# Patient Record
Sex: FEMALE | Race: BLACK OR AFRICAN AMERICAN | NOT HISPANIC OR LATINO | Employment: OTHER | URBAN - METROPOLITAN AREA
[De-identification: names, ages, dates, MRNs, and addresses within clinical notes are randomized per-mention and may not be internally consistent; named-entity substitution may affect disease eponyms.]

---

## 2017-08-02 ENCOUNTER — OFFICE VISIT (OUTPATIENT)
Dept: OBSTETRICS AND GYNECOLOGY | Facility: CLINIC | Age: 77
End: 2017-08-02
Payer: MEDICARE

## 2017-08-02 VITALS
HEIGHT: 62 IN | WEIGHT: 174.19 LBS | SYSTOLIC BLOOD PRESSURE: 117 MMHG | BODY MASS INDEX: 32.05 KG/M2 | DIASTOLIC BLOOD PRESSURE: 63 MMHG

## 2017-08-02 DIAGNOSIS — L28.0 LICHEN SIMPLEX CHRONICUS: ICD-10-CM

## 2017-08-02 DIAGNOSIS — R21 VULVAR RASH: ICD-10-CM

## 2017-08-02 DIAGNOSIS — L29.2 VULVOVAGINAL ITCHING: Primary | ICD-10-CM

## 2017-08-02 PROCEDURE — 99999 PR PBB SHADOW E&M-NEW PATIENT-LVL III: CPT | Mod: PBBFAC,,, | Performed by: OBSTETRICS & GYNECOLOGY

## 2017-08-02 PROCEDURE — 99203 OFFICE O/P NEW LOW 30 MIN: CPT | Mod: S$GLB,,, | Performed by: OBSTETRICS & GYNECOLOGY

## 2017-08-02 PROCEDURE — 87480 CANDIDA DNA DIR PROBE: CPT

## 2017-08-02 PROCEDURE — 1159F MED LIST DOCD IN RCRD: CPT | Mod: S$GLB,,, | Performed by: OBSTETRICS & GYNECOLOGY

## 2017-08-02 PROCEDURE — 1126F AMNT PAIN NOTED NONE PRSNT: CPT | Mod: S$GLB,,, | Performed by: OBSTETRICS & GYNECOLOGY

## 2017-08-02 PROCEDURE — 87660 TRICHOMONAS VAGIN DIR PROBE: CPT

## 2017-08-02 PROCEDURE — 3008F BODY MASS INDEX DOCD: CPT | Mod: S$GLB,,, | Performed by: OBSTETRICS & GYNECOLOGY

## 2017-08-02 RX ORDER — ASPIRIN 81 MG/1
81 TABLET ORAL DAILY
Status: ON HOLD | COMMUNITY
End: 2023-04-30 | Stop reason: HOSPADM

## 2017-08-02 RX ORDER — AMLODIPINE BESYLATE 10 MG/1
10 TABLET ORAL DAILY
Status: ON HOLD | COMMUNITY
End: 2023-04-26

## 2017-08-02 RX ORDER — INSULIN ASPART 100 [IU]/ML
50 INJECTION, SUSPENSION SUBCUTANEOUS EVERY MORNING
Status: ON HOLD | COMMUNITY
End: 2023-03-17 | Stop reason: HOSPADM

## 2017-08-02 RX ORDER — METFORMIN HYDROCHLORIDE 1000 MG/1
1000 TABLET ORAL 2 TIMES DAILY WITH MEALS
Status: ON HOLD | COMMUNITY
End: 2023-04-26

## 2017-08-02 RX ORDER — HYDROXYZINE PAMOATE 25 MG/1
25 CAPSULE ORAL NIGHTLY
Qty: 30 CAPSULE | Refills: 2 | Status: SHIPPED | OUTPATIENT
Start: 2017-08-02 | End: 2017-09-13 | Stop reason: SDUPTHER

## 2017-08-02 RX ORDER — CLOBETASOL PROPIONATE 0.5 MG/G
OINTMENT TOPICAL 2 TIMES DAILY
Qty: 60 G | Refills: 3 | Status: SHIPPED | OUTPATIENT
Start: 2017-08-02 | End: 2018-01-22 | Stop reason: SDUPTHER

## 2017-08-02 RX ORDER — GLIPIZIDE 10 MG/1
10 TABLET ORAL
Status: ON HOLD | COMMUNITY
End: 2023-03-20 | Stop reason: HOSPADM

## 2017-08-02 RX ORDER — ACETAMINOPHEN 500 MG
500 TABLET ORAL DAILY
Status: ON HOLD | COMMUNITY
End: 2019-03-23 | Stop reason: HOSPADM

## 2017-08-02 RX ORDER — LORATADINE 10 MG/1
10 TABLET ORAL DAILY
COMMUNITY
End: 2018-11-08

## 2017-08-02 RX ORDER — PREDNISONE 10 MG/1
10 TABLET ORAL DAILY
Qty: 7 TABLET | Refills: 0 | Status: SHIPPED | OUTPATIENT
Start: 2017-08-02 | End: 2017-08-09

## 2017-08-02 RX ORDER — LOSARTAN POTASSIUM AND HYDROCHLOROTHIAZIDE 25; 100 MG/1; MG/1
1 TABLET ORAL DAILY
Status: ON HOLD | COMMUNITY
End: 2023-04-29 | Stop reason: HOSPADM

## 2017-08-02 RX ORDER — CITALOPRAM 10 MG/1
10 TABLET ORAL DAILY
COMMUNITY
End: 2018-11-08

## 2017-08-02 NOTE — LETTER
August 3, 2017      Vanessa Hancock MD  [address] [facility] [address]           [facility] - OB/ GYN  [address] [facility] [address]  [facility] [address]  Phone: [phone]          Patient: Nelsy Marino   MR Number: [id number]   YOB: 1940   Date of Visit: 8/2/2017       Dear Dr. Vanessa Hancock:    Thank you for referring Nelsy Marino to me for evaluation. Attached you will find relevant portions of my assessment and plan of care.    If you have questions, please do not hesitate to call me. I look forward to following Nelsy Marino along with you.    Sincerely,    Rissa Cortez MD    Enclosure  CC:  No Recipients    If you would like to receive this communication electronically, please contact [email or web address] or [phone] to request more information on [employer] Link access.    For providers and/or their staff who would like to refer a patient to [facility], please contact us through our one-stop-shop provider referral line, [facility] [place], at [phone].    If you feel you have received this communication in error or would no longer like to receive these types of communications, please e-mail [email or web address]

## 2017-08-02 NOTE — PROGRESS NOTES
Subjective:       Patient ID: Nelsy Marino is a 76 y.o. female.    Chief Complaint:  Vaginal Itching      History of Present Illness  HPI  Pt has vaginal itching. Small amount of discharge. She reports severe itching. She does scratch it sometimes. No rash. Just itching.     GYN & OB History  No LMP recorded. Patient is postmenopausal.   Date of Last Pap: No result found    OB History   No data available       Review of Systems  Review of Systems   Genitourinary: Positive for vaginal discharge and vaginal pain. Negative for pelvic pain, vaginal bleeding and vaginal odor.           Objective:    Physical Exam:   Constitutional: She is oriented to person, place, and time. She appears well-developed and well-nourished. No distress.        Pulmonary/Chest: Effort normal.        Abdominal: Soft. Normal appearance and bowel sounds are normal. There is no tenderness.     Genitourinary:       There is rash on the right labia. There is no tenderness, lesion or injury on the right labia. There is rash on the left labia. There is no tenderness, lesion or injury on the left labia. Right adnexum displays no mass, no tenderness and no fullness. Left adnexum displays no mass, no tenderness and no fullness. No erythema, tenderness or bleeding in the vagina. No vaginal discharge found. Cervix exhibits no motion tenderness, no discharge and no friability.               Neurological: She is alert and oriented to person, place, and time.    Skin: Skin is warm and dry.    Psychiatric: She has a normal mood and affect.          Assessment:        1. Vulvovaginal itching    2. Vulvar rash    3. Lichen simplex chronicus              Plan:       1. Vulvovaginal itching  - Vaginosis Screen by DNA Probe    2. Vulvar rash  - clobetasol 0.05% (TEMOVATE) 0.05 % Oint; Apply topically 2 (two) times daily.  Dispense: 60 g; Refill: 3  - hydrOXYzine pamoate (VISTARIL) 25 MG Cap; Take 1 capsule (25 mg total) by mouth every evening.  Dispense: 30  capsule; Refill: 2  - predniSONE (DELTASONE) 10 MG tablet; Take 1 tablet (10 mg total) by mouth once daily.  Dispense: 7 tablet; Refill: 0    3. Lichen simplex chronicus  - If no improvement discussed possible need for biopsy. Will monitor treatment progress.   - Discussed need to decrease itching, and wear loose clothing, cotton underwear. No harsh soaps.   - clobetasol 0.05% (TEMOVATE) 0.05 % Oint; Apply topically 2 (two) times daily.  Dispense: 60 g; Refill: 3  - hydrOXYzine pamoate (VISTARIL) 25 MG Cap; Take 1 capsule (25 mg total) by mouth every evening.  Dispense: 30 capsule; Refill: 2  - predniSONE (DELTASONE) 10 MG tablet; Take 1 tablet (10 mg total) by mouth once daily.  Dispense: 7 tablet; Refill: 0       Return in about 6 weeks (around 9/13/2017) for f/u vulvar itching.

## 2017-08-03 LAB
CANDIDA RRNA VAG QL PROBE: NEGATIVE
G VAGINALIS RRNA GENITAL QL PROBE: NEGATIVE
T VAGINALIS RRNA GENITAL QL PROBE: NEGATIVE

## 2017-09-13 ENCOUNTER — OFFICE VISIT (OUTPATIENT)
Dept: OBSTETRICS AND GYNECOLOGY | Facility: CLINIC | Age: 77
End: 2017-09-13
Payer: MEDICARE

## 2017-09-13 VITALS
SYSTOLIC BLOOD PRESSURE: 117 MMHG | HEIGHT: 62 IN | WEIGHT: 163.13 LBS | DIASTOLIC BLOOD PRESSURE: 63 MMHG | BODY MASS INDEX: 30.02 KG/M2

## 2017-09-13 DIAGNOSIS — L90.0 LICHEN SCLEROSUS ET ATROPHICUS: Primary | ICD-10-CM

## 2017-09-13 DIAGNOSIS — L28.0 LICHEN SIMPLEX CHRONICUS: ICD-10-CM

## 2017-09-13 DIAGNOSIS — R21 VULVAR RASH: ICD-10-CM

## 2017-09-13 PROCEDURE — 3008F BODY MASS INDEX DOCD: CPT | Mod: S$GLB,,, | Performed by: OBSTETRICS & GYNECOLOGY

## 2017-09-13 PROCEDURE — 99999 PR PBB SHADOW E&M-EST. PATIENT-LVL III: CPT | Mod: PBBFAC,,, | Performed by: OBSTETRICS & GYNECOLOGY

## 2017-09-13 PROCEDURE — 1159F MED LIST DOCD IN RCRD: CPT | Mod: S$GLB,,, | Performed by: OBSTETRICS & GYNECOLOGY

## 2017-09-13 PROCEDURE — 99213 OFFICE O/P EST LOW 20 MIN: CPT | Mod: S$GLB,,, | Performed by: OBSTETRICS & GYNECOLOGY

## 2017-09-13 PROCEDURE — 1126F AMNT PAIN NOTED NONE PRSNT: CPT | Mod: S$GLB,,, | Performed by: OBSTETRICS & GYNECOLOGY

## 2017-09-13 RX ORDER — HYDROXYZINE PAMOATE 25 MG/1
25 CAPSULE ORAL NIGHTLY
Qty: 30 CAPSULE | Refills: 2 | Status: ON HOLD | OUTPATIENT
Start: 2017-09-13 | End: 2019-03-20 | Stop reason: CLARIF

## 2017-09-13 NOTE — PROGRESS NOTES
Subjective:       Patient ID: Nelsy Marino is a 76 y.o. female.    Chief Complaint:  Follow-up      History of Present Illness  HPI  Pt reports significant improvement. Doing well. No complaints. She has been using the cream 2x/day as instructed. Reports itch is resolved.     GYN & OB History  No LMP recorded (exact date). Patient is postmenopausal.   Date of Last Pap: No result found    OB History   No data available       Review of Systems  Review of Systems   Genitourinary: Negative for vaginal bleeding, vaginal discharge, vaginal pain and vaginal odor.           Objective:    Physical Exam:   Constitutional: She is oriented to person, place, and time. She appears well-developed and well-nourished. No distress.    HENT:   Head: Normocephalic and atraumatic.    Eyes: EOM are normal.      Pulmonary/Chest: No respiratory distress.          Genitourinary:       There is no rash, tenderness, lesion or injury on the right labia. There is no rash, tenderness, lesion or injury on the left labia.               Neurological: She is alert and oriented to person, place, and time.     Psychiatric: She has a normal mood and affect. Her behavior is normal.          Assessment:        1. Lichen sclerosus et atrophicus    2. Vulvar rash    3. Lichen simplex chronicus              Plan:      1. Lichen sclerosus et atrophicus  - Continue current management.     2. Vulvar rash  - Improved.   - hydrOXYzine pamoate (VISTARIL) 25 MG Cap; Take 1 capsule (25 mg total) by mouth every evening.  Dispense: 30 capsule; Refill: 2    3. Lichen simplex chronicus  - hydrOXYzine pamoate (VISTARIL) 25 MG Cap; Take 1 capsule (25 mg total) by mouth every evening.  Dispense: 30 capsule; Refill: 2  - Much improvement.        Return in about 3 months (around 12/13/2017) for Lichen.

## 2018-01-22 ENCOUNTER — OFFICE VISIT (OUTPATIENT)
Dept: OBSTETRICS AND GYNECOLOGY | Facility: CLINIC | Age: 78
End: 2018-01-22
Payer: MEDICARE

## 2018-01-22 VITALS
DIASTOLIC BLOOD PRESSURE: 72 MMHG | BODY MASS INDEX: 32.05 KG/M2 | HEIGHT: 62 IN | WEIGHT: 174.19 LBS | SYSTOLIC BLOOD PRESSURE: 167 MMHG

## 2018-01-22 DIAGNOSIS — L90.0 LICHEN SCLEROSUS ET ATROPHICUS: ICD-10-CM

## 2018-01-22 PROCEDURE — 99999 PR PBB SHADOW E&M-EST. PATIENT-LVL III: CPT | Mod: PBBFAC,,, | Performed by: OBSTETRICS & GYNECOLOGY

## 2018-01-22 PROCEDURE — 99213 OFFICE O/P EST LOW 20 MIN: CPT | Mod: S$GLB,,, | Performed by: OBSTETRICS & GYNECOLOGY

## 2018-01-22 RX ORDER — CLOBETASOL PROPIONATE 0.5 MG/G
OINTMENT TOPICAL
Qty: 60 G | Refills: 3 | Status: SHIPPED | OUTPATIENT
Start: 2018-01-22 | End: 2018-11-08

## 2018-01-22 NOTE — PROGRESS NOTES
Subjective:       Patient ID: Nelsy Marino is a 77 y.o. female.    Chief Complaint:  Follow-up and Lichen Sclerosus      History of Present Illness  HPI  Pt here for follow up lichen. She is doing very well. No itching or burning. She is very happy with her progress.     GYN & OB History  No LMP recorded. Patient is postmenopausal.   Date of Last Pap: No result found    OB History   No data available       Review of Systems  Review of Systems   Genitourinary: Negative for vaginal bleeding, vaginal discharge, vaginal pain and vaginal odor.           Objective:    Physical Exam:   Constitutional: She is oriented to person, place, and time. She appears well-developed and well-nourished. No distress.    HENT:   Head: Normocephalic and atraumatic.    Eyes: EOM are normal.      Pulmonary/Chest: No respiratory distress.          Genitourinary:       There is no rash, tenderness, lesion or injury on the right labia. There is no rash, tenderness, lesion or injury on the left labia.               Neurological: She is alert and oriented to person, place, and time.     Psychiatric: She has a normal mood and affect. Her behavior is normal.          Assessment:        1. Lichen sclerosus et atrophicus              Plan:      1. Lichen sclerosus et atrophicus  - Pt doing well with lichen and clobetasol ointment. Pt currently applying it 2x/week or as needed. Will space appts out to every 6 months.   - clobetasol 0.05% (TEMOVATE) 0.05 % Oint; Apply topically twice a week.  Dispense: 60 g; Refill: 3       Follow-up in about 6 months (around 7/22/2018) for Lichen.

## 2018-11-06 DIAGNOSIS — I73.9 PVD (PERIPHERAL VASCULAR DISEASE): Primary | ICD-10-CM

## 2018-11-07 ENCOUNTER — HOSPITAL ENCOUNTER (OUTPATIENT)
Dept: RADIOLOGY | Facility: HOSPITAL | Age: 78
Discharge: HOME | End: 2018-11-07
Attending: SURGERY
Payer: MEDICARE

## 2018-11-07 DIAGNOSIS — I73.9 PVD (PERIPHERAL VASCULAR DISEASE): ICD-10-CM

## 2018-11-07 PROCEDURE — 93925 LOWER EXTREMITY STUDY: CPT | Mod: TC

## 2018-11-07 PROCEDURE — 93925 LOWER EXTREMITY STUDY: CPT | Mod: 26,,, | Performed by: RADIOLOGY

## 2018-11-08 ENCOUNTER — INITIAL CONSULT (OUTPATIENT)
Dept: VASCULAR SURGERY | Facility: CLINIC | Age: 78
End: 2018-11-08
Payer: MEDICARE

## 2018-11-08 ENCOUNTER — LAB VISIT (OUTPATIENT)
Dept: LAB | Facility: HOSPITAL | Age: 78
End: 2018-11-08
Attending: SURGERY
Payer: MEDICARE

## 2018-11-08 VITALS
SYSTOLIC BLOOD PRESSURE: 138 MMHG | BODY MASS INDEX: 31.1 KG/M2 | DIASTOLIC BLOOD PRESSURE: 66 MMHG | WEIGHT: 175.5 LBS | HEIGHT: 63 IN

## 2018-11-08 DIAGNOSIS — I74.9 EMBOLISM AND THROMBOSIS OF ARTERY: ICD-10-CM

## 2018-11-08 DIAGNOSIS — I73.9 PVD (PERIPHERAL VASCULAR DISEASE) WITH CLAUDICATION: ICD-10-CM

## 2018-11-08 DIAGNOSIS — I73.9 PVD (PERIPHERAL VASCULAR DISEASE) WITH CLAUDICATION: Primary | ICD-10-CM

## 2018-11-08 LAB
ANION GAP SERPL CALC-SCNC: 5 MMOL/L
APTT BLDCRRT: 28.1 SEC
BASOPHILS # BLD AUTO: 0.01 K/UL
BASOPHILS NFR BLD: 0.1 %
BUN SERPL-MCNC: 19 MG/DL
CALCIUM SERPL-MCNC: 9.6 MG/DL
CHLORIDE SERPL-SCNC: 109 MMOL/L
CO2 SERPL-SCNC: 25 MMOL/L
CREAT SERPL-MCNC: 1.1 MG/DL
DIFFERENTIAL METHOD: ABNORMAL
EOSINOPHIL # BLD AUTO: 0.2 K/UL
EOSINOPHIL NFR BLD: 3.6 %
ERYTHROCYTE [DISTWIDTH] IN BLOOD BY AUTOMATED COUNT: 15.7 %
EST. GFR  (AFRICAN AMERICAN): 56 ML/MIN/1.73 M^2
EST. GFR  (NON AFRICAN AMERICAN): 48 ML/MIN/1.73 M^2
GLUCOSE SERPL-MCNC: 125 MG/DL
HCT VFR BLD AUTO: 34.1 %
HGB BLD-MCNC: 10.9 G/DL
INR PPP: 1
LYMPHOCYTES # BLD AUTO: 2 K/UL
LYMPHOCYTES NFR BLD: 29.8 %
MAGNESIUM SERPL-MCNC: 2.2 MG/DL
MCH RBC QN AUTO: 24.7 PG
MCHC RBC AUTO-ENTMCNC: 32 G/DL
MCV RBC AUTO: 77 FL
MONOCYTES # BLD AUTO: 0.4 K/UL
MONOCYTES NFR BLD: 6.4 %
NEUTROPHILS # BLD AUTO: 4.1 K/UL
NEUTROPHILS NFR BLD: 60.1 %
PHOSPHATE SERPL-MCNC: 2.9 MG/DL
PLATELET # BLD AUTO: 257 K/UL
PMV BLD AUTO: 10 FL
POTASSIUM SERPL-SCNC: 4.2 MMOL/L
PROTHROMBIN TIME: 10.7 SEC
RBC # BLD AUTO: 4.41 M/UL
SODIUM SERPL-SCNC: 139 MMOL/L
WBC # BLD AUTO: 6.75 K/UL

## 2018-11-08 PROCEDURE — 1101F PT FALLS ASSESS-DOCD LE1/YR: CPT | Mod: CPTII,S$GLB,, | Performed by: SURGERY

## 2018-11-08 PROCEDURE — 99205 OFFICE O/P NEW HI 60 MIN: CPT | Mod: S$GLB,,, | Performed by: SURGERY

## 2018-11-08 PROCEDURE — 80048 BASIC METABOLIC PNL TOTAL CA: CPT

## 2018-11-08 PROCEDURE — 83735 ASSAY OF MAGNESIUM: CPT

## 2018-11-08 PROCEDURE — 85025 COMPLETE CBC W/AUTO DIFF WBC: CPT

## 2018-11-08 PROCEDURE — 36415 COLL VENOUS BLD VENIPUNCTURE: CPT

## 2018-11-08 PROCEDURE — 99999 PR PBB SHADOW E&M-EST. PATIENT-LVL IV: CPT | Mod: PBBFAC,,, | Performed by: SURGERY

## 2018-11-08 PROCEDURE — 85730 THROMBOPLASTIN TIME PARTIAL: CPT

## 2018-11-08 PROCEDURE — 84100 ASSAY OF PHOSPHORUS: CPT

## 2018-11-08 PROCEDURE — 85610 PROTHROMBIN TIME: CPT

## 2018-11-08 RX ORDER — CILOSTAZOL 50 MG/1
50 TABLET ORAL 2 TIMES DAILY
Qty: 60 TABLET | Refills: 11 | Status: ON HOLD | OUTPATIENT
Start: 2018-11-08 | End: 2023-03-17 | Stop reason: HOSPADM

## 2018-11-08 RX ORDER — DIPHENHYDRAMINE HCL 25 MG
25 CAPSULE ORAL EVERY 6 HOURS PRN
Status: ON HOLD | COMMUNITY
End: 2019-03-23 | Stop reason: HOSPADM

## 2018-11-08 NOTE — PROGRESS NOTES
Danny Dunbar MD [facility]                         2018    HPI:  Nelsy Marino is a 78 y.o. female with   Patient Active Problem List   Diagnosis    Lichen sclerosus et atrophicus    being managed by PCP and specialists who is here today for evaluation of RLE pain.  She is s/p fem-fem approx 20 yrs ago without issues until recently.  Fem-fem that was performed for RLE ALI was noted to be occluded on US at [facility].  Was scheduled for an angiogram at [facility] although was unable to proceed due to insurance purposes.  Patient states location is R calf and thigh occurring for 2 mo.  Associated signs and symptoms include none.  Quality is throbbing and severity is 9/10 at worst.  Symptoms began 2 mo ago.  Alleviating factors include rest.  Worsening factors include ambulation.  Pt states RLE claudication x 1 block.  States it is lifestyle limiting.     no MI  no Stroke  Tobacco use: quit 35 yrs ago    Past Medical History:   Diagnosis Date    Diabetes mellitus     Hypertension     Miscarriage      Past Surgical History:   Procedure Laterality Date    HYSTERECTOMY      ?unsure cervix present     Family History   Problem Relation Age of Onset    Diabetes Father     Hypertension Father      Social History     Socioeconomic History    Marital status:      Spouse name: Not on file    Number of children: Not on file    Years of education: Not on file    Highest education level: Not on file   Social Needs    Financial resource strain: Not on file    Food insecurity - worry: Not on file    Food insecurity - inability: Not on file    Transportation needs - medical: Not on file    Transportation needs - non-medical: Not on file   Occupational History    Not on file   Tobacco Use    Smoking status: Former Smoker     Last attempt to quit: 1980     Years since quittin.8    Smokeless tobacco: Never Used   Substance and Sexual Activity     "Alcohol use: No    Drug use: No    Sexual activity: No   Other Topics Concern    Not on file   Social History Narrative    Not on file       Current Outpatient Medications:     acetaminophen (TYLENOL) 500 MG tablet, Take 500 mg by mouth once daily., Disp: , Rfl:     amlodipine (NORVASC) 10 MG tablet, Take 10 mg by mouth once daily., Disp: , Rfl:     aspirin (ECOTRIN) 81 MG EC tablet, Take 81 mg by mouth once daily., Disp: , Rfl:     BLOOD SUGAR DIAGNOSTIC (TRUE METRIX GLUCOSE TEST STRIP MISC), by Misc.(Non-Drug; Combo Route) route., Disp: , Rfl:     diphenhydrAMINE (BENADRYL) 25 mg capsule, Take 25 mg by mouth every 6 (six) hours as needed for Itching., Disp: , Rfl:     glipiZIDE (GLUCOTROL) 10 MG tablet, Take 10 mg by mouth 2 (two) times daily before meals., Disp: , Rfl:     hydrOXYzine pamoate (VISTARIL) 25 MG Cap, Take 1 capsule (25 mg total) by mouth every evening., Disp: 30 capsule, Rfl: 2    insulin aspart protamine-insulin aspart (NOVOLOG 70/30) 100 unit/mL (70-30) InPn pen, Inject into the skin 2 (two) times daily before meals., Disp: , Rfl:     insulin syringe-needle,dispos. 0.3 mL 30 gauge x 5/16" Syrg, by Misc.(Non-Drug; Combo Route) route., Disp: , Rfl:     losartan-hydrochlorothiazide 100-25 mg (HYZAAR) 100-25 mg per tablet, Take 1 tablet by mouth once daily., Disp: , Rfl:     metformin (GLUCOPHAGE) 1000 MG tablet, Take 1,000 mg by mouth 2 (two) times daily with meals., Disp: , Rfl:     REVIEW OF SYSTEMS:  General: No fevers or chills; ENT: No sore throat; Allergy and Immunology: no persistent infections; Hematological and Lymphatic: No history of bleeding or easy bruising; Endocrine: negative; Respiratory: no cough, shortness of breath, or wheezing; Cardiovascular: no chest pain or dyspnea on exertion; Gastrointestinal: no abdominal pain/back, change in bowel habits, or bloody stools; Genito-Urinary: no dysuria, trouble voiding, or hematuria; Musculoskeletal: negative; Neurological: no " TIA or stroke symptoms; Psychiatric: no nervousness, anxiety or depression.    PHYSICAL EXAM:   Right Arm BP - Sittin/68 (18 1020)  Left Arm BP - Sittin/66 (18 1020)            General appearance:  Alert, well-appearing, and in no distress.  Oriented to person, place, and time                    Neurological: Normal speech, no focal findings noted; CN II - XII grossly intact. RLE with sensation to light touch, LLE with sensation to light touch.            Musculoskeletal: Digits/nail without cyanosis/clubbing.  Strength 5/5 BLE.                    Neck: Supple, no significant adenopathy                  Chest:  Clear to auscultation, no wheezes, rales or rhonchi, symmetric air entry. No use of accessory muscles               Cardiac: Normal rate and regular rhythm, S1 and S2 normal            Abdomen: Soft, nontender, nondistended, no masses or organomegaly, no hernia     No rebound tenderness noted; bowel sounds normal     No groin adenopathy      Extremities:   1+ R femoral pulse, 2+ L femoral pulse     doppler+ R popliteal pulse, doppler+ L popliteal pulse     doppler+ R PT pulse, doppler+ L PT pulse     doppler+ R DP pulse, doppler+ L DP pulse     no RLE edema, no LLE edema    Skin: RLE without tissue loss; LLE without tissue loss    LAB RESULTS:  No results found for: CBC  No results found for: LABPROT, INR  No results found for: NA, K, CL, CO2, GLU, BUN, CREATININE, CALCIUM, ANIONGAP, EGFRNONAA  No results found for: WBC, RBC, HGB, HCT, MCV, MCH, MCHC, RDW, PLT, MPV, GRAN, LYMPH, MONO, EOS, BASO, GRAN, EOSINOPHIL, BASOPHIL, DIFFMETHOD  .No results found for: HGBA1C    IMAGING:  All pertinent imaging has been reviewed and interpreted independently.    18: US showing occluded fem-fem, decreased velocity R CFA, HD significant stenosis R SFA    [facility] ARCHIE: 0.4/1    IMP/PLAN:  78 y.o. female with   Patient Active Problem List   Diagnosis    Lichen sclerosus et atrophicus    being managed  by PCP and specialists who is here today for evaluation of RLE claudication, lifestyle limiting.    -Rec RLE angiogram, L brachial access   -Will start Pletal  -Cont ASA, rec starting statin  -Heart healthy lifestyle  -Cont smoking cessation    I spent 30 minutes evaluating this patient and greater than 50% of the time was spent counseling, coordinator care and discussing the plan of care.  All questions were answered and patient stated understanding with agreement with the above treatment plan.    Danny Dunbar MD [facility]  Vascular and Endovascular Surgery

## 2018-11-08 NOTE — LETTER
November 8, 2018      Jeremías Oconnell MD  [address] [facility] [address]           [facility]  [address] [facility] [address]  [facility] [address]  Phone: [phone]  Fax: [phone]          Patient: Nelsy Marino   MR Number: [id number]   YOB: 1940   Date of Visit: 11/8/2018       Dear Dr. Jeremías Oconnell:    Thank you for referring Nelsy Marino to me for evaluation. Attached you will find relevant portions of my assessment and plan of care.    I greatly appreciate the referral and I will keep you abreast of my findings.  Please let me know if there is anything else that I can do to help you.    If you have questions, please do not hesitate to call me. I look forward to following Nelsy Marino along with you.    Sincerely,    Danny Dunbar MD    Enclosure  CC:  No Recipients    If you would like to receive this communication electronically, please contact [email or web address] or [phone] to request more information on [employer] Link access.    For providers and/or their staff who would like to refer a patient to [facility], please contact us through our one-stop-shop provider referral line, [facility] , at [phone].    If you feel you have received this communication in error or would no longer like to receive these types of communications, please e-mail [email or web address]

## 2018-11-08 NOTE — PATIENT INSTRUCTIONS
Peripheral Artery Disease (PAD)     Peripheral artery disease (PAD) occurs when the arteries that carry blood to the limbs are narrowed or blocked. This is usually due to a buildup of a fatty substance called plaque in the walls of the arteries.  PAD most often affects the arteries in the legs. When these arteries are narrowed or blocked, blood flow to the legs is reduced. This can cause leg and foot pain and other symptoms. If severe enough, reduced blood flow to the legs can lead to tissue death (gangrene) and the loss of a toe, foot, or leg. Having PAD also makes it more likely that arteries in other body areas are blocked. For instance, arteries that carry blood to the heart or brain may be affected. This raises the chances of heart attack and stroke.  Risk factors  Certain factors can make PAD more likely. They include:  · Smoking  · Diabetes  · High blood pressure  · Unhealthy cholesterol levels  · Obesity  · Inactive lifestyle  · Older age  · Family history of PAD  Symptoms  Many people with PAD have no symptoms. If symptoms do occur, they can include:  · Pain in the muscles of the calves, thighs or hips that gets worse with activity and better with rest (intermittent claudication)  · Achy, tired, or heavy feeling in the legs  · Weakness, numbness, tingling, or loss of feeling in the legs  · Changes in skin color of the legs  · Sores on the legs and feet  · Cold leg, feet, or toes  · Pain the feet or toes even when lying down (rest pain)  Home care  PAD is a chronic (lifelong) condition. Treatment is focused on managing your condition and lowering your health risks. This may include doing the following:  · If you smoke, quit. This helps prevent further damage to your arteries and lowers your health risks. Ask your provider about medicines or products that can help you quit smoking. Also consider joining a stop-smoking program or support group.  · Be more active. This helps you lose weight and manage  problems such as high blood pressure and unhealthy cholesterol levels. Start a walking program if advised to by your provider. Your provider may also help you form a safe exercise program that is right for your needs.  · Make healthy eating changes. This includes eating less fat, salt, and sugar.  · Take medicines for high blood pressure, unhealthy cholesterol levels, and diabetes as directed.  · Have your blood pressure and cholesterol levels checked as often as directed.  · If you have diabetes, try to keep your blood sugar well controlled. Test your blood sugar as directed.  · If you are overweight, talk to your provider about forming a weight-loss plan.  · Watch for cuts, scrapes, or open sores on your feet. Poor blood flow to the feet may slow healing and increase the risk of infection from these problems.   Follow-up care  Follow up with your healthcare provider, or as advised. If imaging tests such as ultrasound were done, they will be reviewed by a doctor. You will be told the results and any new findings that may affect your care.  When to seek medical advice   Call your healthcare provider right away if any of these occur:  · Sudden severe pain in the legs or feet  · Sudden cold, pale or blue color in the legs or feet  · Weakness or numbness in the legs or feet that worsens  · Any sore or wound in the legs or feet that wont heal  · Weak pulse in your legs or feet  Know the Signs of Heart Attack and Stroke  People with PAD are at high risk for heart attack and stroke. Knowing the signs of these problems can help you protect your health and get help when you need it. Call 911 right away if you have any of the following:  Signs of a Heart Attack  · Chest discomfort, such as pain, aching, tightness, or pressure that lasts more than a few minutes, or that comes and goes  · Pain or discomfort in the arms, back, shoulders, neck, or jaw  · Shortness of breath  · Sweating (often a cold, clammy  sweat)  · Nausea  · Lightheadedness  Signs of a Stroke  · Sudden numbness or weakness of the face, arms, or legs, especially on one side  · Sudden confusion or trouble speaking or understanding  · Sudden trouble seeing in one or both eyes  · Sudden trouble walking, dizziness, or loss of balance  · Sudden, severe headache with no known cause   Date Last Reviewed: 9/21/2015  © 2929-8397 [employer]. [address] [place] [address], [place] [id number]. All rights reserved. This information is not intended as a substitute for professional medical care. Always follow your healthcare professional's instructions.

## 2018-11-15 DIAGNOSIS — I74.9 ARTERIAL EMBOLISM: ICD-10-CM

## 2018-11-16 ENCOUNTER — HOSPITAL ENCOUNTER (OUTPATIENT)
Dept: RADIOLOGY | Facility: HOSPITAL | Age: 78
Discharge: HOME | End: 2018-11-16
Attending: SURGERY | Admitting: SURGERY
Payer: MEDICARE

## 2018-11-16 DIAGNOSIS — I73.9 PAD (PERIPHERAL ARTERY DISEASE): Primary | ICD-10-CM

## 2018-11-16 DIAGNOSIS — I74.9 ARTERIAL EMBOLISM: ICD-10-CM

## 2018-11-16 PROCEDURE — 25500020 PHARM REV CODE 255: Performed by: SURGERY

## 2018-11-16 PROCEDURE — 75635 CT ANGIO ABDOMINAL ARTERIES: CPT | Mod: 26,,, | Performed by: RADIOLOGY

## 2018-11-16 PROCEDURE — 75635 CT ANGIO ABDOMINAL ARTERIES: CPT | Mod: TC

## 2018-11-16 RX ADMIN — IOHEXOL 125 ML: 350 INJECTION, SOLUTION INTRAVENOUS at 08:11

## 2019-01-02 ENCOUNTER — TELEPHONE (OUTPATIENT)
Dept: VASCULAR SURGERY | Facility: CLINIC | Age: 79
End: 2019-01-02

## 2019-01-02 NOTE — TELEPHONE ENCOUNTER
----- Message from Litafilemonmeron Asha sent at 1/2/2019 10:29 AM CST -----  Contact: Self/[phone]  Patient called to request a sooner Surgery Date. She stated that she's in pain. Thank you.      1040 Call to patient who stated she wanted a sooner appointment then in February as she was having pain. Explained could get her a appointment on Jan 11 with Dr. Dunbar but she stated she could not make that day. Agreed on Jan 14th for her ultrasound and appointment after. Explained would mail out appointment slips to patient.

## 2019-01-14 ENCOUNTER — HOSPITAL ENCOUNTER (OUTPATIENT)
Dept: CARDIOLOGY | Facility: HOSPITAL | Age: 79
Discharge: HOME | End: 2019-01-14
Attending: SURGERY
Payer: MEDICARE

## 2019-01-14 ENCOUNTER — OFFICE VISIT (OUTPATIENT)
Dept: VASCULAR SURGERY | Facility: CLINIC | Age: 79
End: 2019-01-14
Payer: MEDICARE

## 2019-01-14 VITALS
HEIGHT: 63 IN | BODY MASS INDEX: 30.56 KG/M2 | HEART RATE: 62 BPM | SYSTOLIC BLOOD PRESSURE: 130 MMHG | DIASTOLIC BLOOD PRESSURE: 62 MMHG | WEIGHT: 172.5 LBS

## 2019-01-14 DIAGNOSIS — I73.9 PVD (PERIPHERAL VASCULAR DISEASE) WITH CLAUDICATION: Primary | ICD-10-CM

## 2019-01-14 DIAGNOSIS — I73.9 PAD (PERIPHERAL ARTERY DISEASE): ICD-10-CM

## 2019-01-14 DIAGNOSIS — R09.89 DIMINISHED PULSE: ICD-10-CM

## 2019-01-14 PROCEDURE — 93922 CV US ANKLE BRACHIAL INDICES WO STRESS W TOE TRACINGS (CUPID ONLY): ICD-10-PCS | Mod: 26,,, | Performed by: SURGERY

## 2019-01-14 PROCEDURE — 99999 PR PBB SHADOW E&M-EST. PATIENT-LVL IV: CPT | Mod: PBBFAC,,, | Performed by: SURGERY

## 2019-01-14 PROCEDURE — 99214 PR OFFICE/OUTPT VISIT, EST, LEVL IV, 30-39 MIN: ICD-10-PCS | Mod: S$GLB,,, | Performed by: SURGERY

## 2019-01-14 PROCEDURE — 93922 UPR/L XTREMITY ART 2 LEVELS: CPT | Mod: 50

## 2019-01-14 PROCEDURE — 99999 PR PBB SHADOW E&M-EST. PATIENT-LVL IV: ICD-10-PCS | Mod: PBBFAC,,, | Performed by: SURGERY

## 2019-01-14 PROCEDURE — 1101F PR PT FALLS ASSESS DOC 0-1 FALLS W/OUT INJ PAST YR: ICD-10-PCS | Mod: CPTII,S$GLB,, | Performed by: SURGERY

## 2019-01-14 PROCEDURE — 1101F PT FALLS ASSESS-DOCD LE1/YR: CPT | Mod: CPTII,S$GLB,, | Performed by: SURGERY

## 2019-01-14 PROCEDURE — 93922 UPR/L XTREMITY ART 2 LEVELS: CPT | Mod: 26,,, | Performed by: SURGERY

## 2019-01-14 PROCEDURE — 99214 OFFICE O/P EST MOD 30 MIN: CPT | Mod: S$GLB,,, | Performed by: SURGERY

## 2019-01-14 NOTE — PATIENT INSTRUCTIONS
Taking Aspirin for Atherosclerosis       Aspirin is a medicine often prescribed to treat atherosclerosis. This condition affects your arteries. These are the blood vessels that carry blood away from your heart. Having atherosclerosis means youre at greater risk of a heart attack or stroke. Aspirin can help prevent these from happening.  How atherosclerosis affects your arteries   A fatty material (plaque) can build up in your arteries. This makes it harder for blood to flow through them. A blood clot can then form on the plaque. This may block the artery, cutting off blood flow. This can cause conditions such as coronary artery disease (CAD) and peripheral arterial disease (PAD):  · CAD occurs when plaque builds up in the coronary artery. This artery supplies the heart with oxygen-rich blood.  · PAD occurs when plaque forms in leg arteries.  The same things that cause CAD and PAD can also cause plaque to form in other arteries in your body, such as those in the brain. When plaque occurs in any of these arteries, it raises your risk of heart attack or stroke.  What aspirin does  Aspirin is a blood-thinner (antiplatelet medicine). It helps keep blood clots from forming. This reduces the risk of blockage. Aspirin can be taken daily if you are at high risk of or have already had a heart attack or stroke. It is also used after a procedure called a stent placement. This is when a tiny wire mesh tube, or stent, is placed in an artery to keep it open. Aspirin helps prevent blood clots from forming on the stent.  Taking aspirin safely  Tell your healthcare provider about any medicines you are taking. This includes:  · All prescription medicines  · Over-the-counter medicines  · Herbs, vitamins, and other supplements  Also mention if you have a history of ulcers or bleeding problems. Ask whether you will need to stop taking aspirin before having surgery or dental work. Always take medicines as directed.  Tips for taking  aspirin  · Have a routine. For example, take aspirin with the same meal each day.  · Dont take more than prescribed. A low dose gives the same benefit as a higher one, with a lower risk of side effects.  · Dont skip doses. Aspirin needs to be taken each day to work well.  · Keep track of what you take. A pillbox with days of the week can help, especially if you take several medicines. Or use a list or chart to keep track.  When to call your healthcare provider  Side effects of aspirin are not usually serious. If you do have problems, changing your dose may help. Call your healthcare provider if you have any of the following:  · Excessive bruising (some bruising is normal)  · Nosebleeds, bleeding gums, or other excessive bleeding  · An upset stomach or stomach pain   Date Last Reviewed: 6/1/2016  © 1112-7613 [employer]. [address] [place] [address]. All rights reserved. This information is not intended as a substitute for professional medical care. Always follow your healthcare professional's instructions.        A Walking Program for Peripheral Arterial Disease (PAD)  Peripheral arterial disease (PAD) is a condition where the arteries in the legs are severely damaged. When you have PAD, walking can be painful. So you may start to walk less. Walking less makes your leg muscles weaker. It then becomes harder and more painful to walk. A walking program can break this cycle. This sheet gives you tips on how to get started.     Walking farther without pain  Exercise strengthens leg muscles that are out of shape. It also trains these muscles to work with less oxygen. This helps your leg muscles work better even with reduced blood flow to your legs. When you have PAD, a walking program can be helpful. Your program can:  · Let you walk longer and farther without claudication. This is an ache in your legs during exercise that goes away with rest.  · Let you do more and be more active  · Add to  your overall health and well-being  · Help you control your blood sugar and blood pressure  · Help you become healthier with no risk and at little or no cost  Getting started  Your local hospital, vascular center, or cardiac rehab center may have a special walking program for people with PAD. If so, this is your best option. But if you cant find a program, or its not covered by insurance, you can still walk on your own. Follow these steps at each session:  · Step 1. Start at a pace that lets you walk for 5 to 10 minutes before you start to feel claudication. This feeling is unpleasant, but it doesnt hurt you. Keep going until the pain makes you feel that you need to stop.  · Step 2. Stop and rest for 3 to 5 minutes, just long enough for the pain to go away. You can rest standing or sitting. (Some people like to bring along a cane or a lightweight folding chair.)  · Step 3. Again walk at a pace that lets you walk for 5 to 10 minutes more before you feel pain. This may be slower than your starting pace in step 1. Then rest again.  · Step 4. Repeat this process until youve walked for 45 minutes. This should be about 60 to 80 minutes total, including resting time. You may not be able to do a full 45 minutes at first. Do as much as you can, and increase your time as you improve.  Making the most of your program  · Walk at least 3 times a week. Take no more than 2 days off between sessions. If you stop walking, even for a week or two, you can lose the health benefits of your program.  · Find a good place to walk. A treadmill or a track may be better at first. That way, you wont run the risk of going too far and finding that you cant walk back. Be sure to have a place to walk indoors in bad weather, such as a gym or a mall.  · Wear shoes with sturdy, flexible soles. The heel should fit without slipping. You should have room to wiggle your toes.  · Keep track of how long you walk. A pedometer will show your daily  progress. It can also show how much farther you can walk as time goes by.  · Ask a friend to keep you company. You may enjoy walking with someone else. Or you may want to make your walking sessions a time to relax by yourself.  · Make it fun. Listen to music while you walk and rest. Walk in a favorite park. Get to know the people at the gym, or the folks that you pass on your route. While you rest, you can window-shop, read, or feed the birds. Do whatever will help you enjoy your exercise sessions.  Date Last Reviewed: 6/1/2016 © 2000-2017 [employer]. [address] [place] [address], [place] [address]. All rights reserved. This information is not intended as a substitute for professional medical care. Always follow your healthcare professional's instructions.        Peripheral Artery Disease (PAD)     Peripheral artery disease (PAD) occurs when the arteries that carry blood to the limbs are narrowed or blocked. This is usually due to a buildup of a fatty substance called plaque in the walls of the arteries.  PAD most often affects the arteries in the legs. When these arteries are narrowed or blocked, blood flow to the legs is reduced. This can cause leg and foot pain and other symptoms. If severe enough, reduced blood flow to the legs can lead to tissue death (gangrene) and the loss of a toe, foot, or leg. Having PAD also makes it more likely that arteries in other body areas are blocked. For instance, arteries that carry blood to the heart or brain may be affected. This raises the chances of heart attack and stroke.  Risk factors  Certain factors can make PAD more likely. They include:  · Smoking  · Diabetes  · High blood pressure  · Unhealthy cholesterol levels  · Obesity  · Inactive lifestyle  · Older age  · Family history of PAD  Symptoms  Many people with PAD have no symptoms. If symptoms do occur, they can include:  · Pain in the muscles of the calves, thighs or hips that gets worse with activity and  better with rest (intermittent claudication)  · Achy, tired, or heavy feeling in the legs  · Weakness, numbness, tingling, or loss of feeling in the legs  · Changes in skin color of the legs  · Sores on the legs and feet  · Cold leg, feet, or toes  · Pain the feet or toes even when lying down (rest pain)  Home care  PAD is a chronic (lifelong) condition. Treatment is focused on managing your condition and lowering your health risks. This may include doing the following:  · If you smoke, quit. This helps prevent further damage to your arteries and lowers your health risks. Ask your provider about medicines or products that can help you quit smoking. Also consider joining a stop-smoking program or support group.  · Be more active. This helps you lose weight and manage problems such as high blood pressure and unhealthy cholesterol levels. Start a walking program if advised to by your provider. Your provider may also help you form a safe exercise program that is right for your needs.  · Make healthy eating changes. This includes eating less fat, salt, and sugar.  · Take medicines for high blood pressure, unhealthy cholesterol levels, and diabetes as directed.  · Have your blood pressure and cholesterol levels checked as often as directed.  · If you have diabetes, try to keep your blood sugar well controlled. Test your blood sugar as directed.  · If you are overweight, talk to your provider about forming a weight-loss plan.  · Watch for cuts, scrapes, or open sores on your feet. Poor blood flow to the feet may slow healing and increase the risk of infection from these problems.   Follow-up care  Follow up with your healthcare provider, or as advised. If imaging tests such as ultrasound were done, they will be reviewed by a doctor. You will be told the results and any new findings that may affect your care.  When to seek medical advice   Call your healthcare provider right away if any of these occur:  · Sudden severe pain  in the legs or feet  · Sudden cold, pale or blue color in the legs or feet  · Weakness or numbness in the legs or feet that worsens  · Any sore or wound in the legs or feet that wont heal  · Weak pulse in your legs or feet  Know the Signs of Heart Attack and Stroke  People with PAD are at high risk for heart attack and stroke. Knowing the signs of these problems can help you protect your health and get help when you need it. Call 911 right away if you have any of the following:  Signs of a Heart Attack  · Chest discomfort, such as pain, aching, tightness, or pressure that lasts more than a few minutes, or that comes and goes  · Pain or discomfort in the arms, back, shoulders, neck, or jaw  · Shortness of breath  · Sweating (often a cold, clammy sweat)  · Nausea  · Lightheadedness  Signs of a Stroke  · Sudden numbness or weakness of the face, arms, or legs, especially on one side  · Sudden confusion or trouble speaking or understanding  · Sudden trouble seeing in one or both eyes  · Sudden trouble walking, dizziness, or loss of balance  · Sudden, severe headache with no known cause   Date Last Reviewed: 9/21/2015  © 4227-0770 [employer]. [address] [place] [address], [place] [address]. All rights reserved. This information is not intended as a substitute for professional medical care. Always follow your healthcare professional's instructions.

## 2019-01-14 NOTE — LETTER
January 14, 2019        [place] [address] [facility] [address]             [facility] [place] - Vascular Surgery  [address]  [facility] [address]  Phone: [phone]  Fax: [phone]   Patient: Nelsy Marino   MR Number: [id number]   YOB: 1940   Date of Visit: 1/14/2019       Dear Dr. Garay:    Thank you for referring Nelsy Marino to me for evaluation. Below are the relevant portions of my assessment and plan of care.            If you have questions, please do not hesitate to call me. I look forward to following Nelsy along with you.    Sincerely,      Danny Dunbar MD           CC  No Recipients

## 2019-01-15 LAB
LEFT ABI: 1.11
LEFT ARM BP: 133 MMHG
LEFT DORSALIS PEDIS: 138 MMHG
LEFT POSTERIOR TIBIAL: 147 MMHG
LEFT TBI: 0.48
LEFT TOE PRESSURE: 64 MMHG
RIGHT ABI: 0.37
RIGHT ARM BP: 127 MMHG
RIGHT DORSALIS PEDIS: 49 MMHG
RIGHT POSTERIOR TIBIAL: 25 MMHG
RIGHT TBI: 0
RIGHT TOE PRESSURE: 0 MMHG

## 2019-01-29 ENCOUNTER — HOSPITAL ENCOUNTER (OUTPATIENT)
Dept: RADIOLOGY | Facility: HOSPITAL | Age: 79
Discharge: HOME | End: 2019-01-29
Attending: SURGERY
Payer: MEDICARE

## 2019-01-29 ENCOUNTER — TELEPHONE (OUTPATIENT)
Dept: VASCULAR SURGERY | Facility: CLINIC | Age: 79
End: 2019-01-29

## 2019-01-29 ENCOUNTER — HOSPITAL ENCOUNTER (OUTPATIENT)
Dept: CARDIOLOGY | Facility: HOSPITAL | Age: 79
Discharge: HOME | End: 2019-01-29
Attending: SURGERY
Payer: MEDICARE

## 2019-01-29 DIAGNOSIS — R09.89 DIMINISHED PULSE: ICD-10-CM

## 2019-01-29 DIAGNOSIS — I73.9 PVD (PERIPHERAL VASCULAR DISEASE) WITH CLAUDICATION: ICD-10-CM

## 2019-01-29 LAB
LEFT BRACHIAL BP: 134 MMHG
LEFT RADIAL BP CV US: 182 MMHG
LEFT ULNAR BP CV US: 205 MMHG
RIGHT BRACHIAL BP CV US: 142 MMHG
RIGHT RADIAL BP CV US: 190 MMHG
RIGHT ULNAR BP CV US: 182 MMHG

## 2019-01-29 PROCEDURE — 71275 CTA CHEST NON CORONARY: ICD-10-PCS | Mod: 26,,, | Performed by: RADIOLOGY

## 2019-01-29 PROCEDURE — 93923 CV US WRIST BRACHIAL INDICIES (CUPID ONLY): ICD-10-PCS | Mod: 26,,, | Performed by: SURGERY

## 2019-01-29 PROCEDURE — 71275 CT ANGIOGRAPHY CHEST: CPT | Mod: TC

## 2019-01-29 PROCEDURE — 25500020 PHARM REV CODE 255: Performed by: SURGERY

## 2019-01-29 PROCEDURE — 93923 UPR/LXTR ART STDY 3+ LVLS: CPT | Mod: 26,,, | Performed by: SURGERY

## 2019-01-29 PROCEDURE — 93923 UPR/LXTR ART STDY 3+ LVLS: CPT | Mod: 50

## 2019-01-29 PROCEDURE — 71275 CT ANGIOGRAPHY CHEST: CPT | Mod: 26,,, | Performed by: RADIOLOGY

## 2019-01-29 RX ADMIN — IOHEXOL 75 ML: 350 INJECTION, SOLUTION INTRAVENOUS at 10:01

## 2019-01-29 NOTE — TELEPHONE ENCOUNTER
Call to [facility] so that her primary care doctor could be notified that her potassium was 5.5. Explained that did fax her lab work to the office also. The  stated she would let patients primary care doctor know.

## 2019-01-29 NOTE — TELEPHONE ENCOUNTER
Call to Ms. Marino to let her know that her potassium level was a little above what it should be and that her results were faxed to her primary care doctor. Asked patient if she was feeling ok and she stated that she felt fine today. Also explained that her appointment would be moved from the 4th to the 14th so that her heart test could be done before she talk to Dr. Dunbar about surgery. She stated understanding.

## 2019-02-04 ENCOUNTER — TELEPHONE (OUTPATIENT)
Dept: VASCULAR SURGERY | Facility: CLINIC | Age: 79
End: 2019-02-04

## 2019-02-04 NOTE — TELEPHONE ENCOUNTER
----- Message from Nadege Woodall sent at 2/4/2019 11:33 AM CST -----  Contact: self [phone]  Pt states that she tried to get medical records from  but they only go back 10 years      1145 Call to Ms. Marino and left a voicemail stating that received her message and would let Dr. Dunbar know.

## 2019-02-04 NOTE — TELEPHONE ENCOUNTER
Call to Ms. Marino for her to have her records from [facility] to fax over the records from her last surgery done by Dr. Oconnell. Gave MsYaya Marino the fax number to the office. She stated she would talk with them about getting the records.

## 2019-02-06 ENCOUNTER — TELEPHONE (OUTPATIENT)
Dept: VASCULAR SURGERY | Facility: CLINIC | Age: 79
End: 2019-02-06

## 2019-02-06 NOTE — TELEPHONE ENCOUNTER
----- Message from Geena De Leon sent at 2/6/2019 10:18 AM CST -----  Contact: pt  Name of Who is Calling: pt      What is the request in detail: pt needs to discuss her records from [facility]. Call pt      Can the clinic reply by [facility]: no      What Number [facility] Back [facility] [phone]    1037 Call to patient who stated that she wanted to be sure the office received her message about [facility] records. Explained that we did and Dr. Dunbar was aware that her records could not be obtained as they were over 10 years ago.

## 2019-02-14 ENCOUNTER — OFFICE VISIT (OUTPATIENT)
Dept: VASCULAR SURGERY | Facility: CLINIC | Age: 79
End: 2019-02-14
Payer: MEDICARE

## 2019-02-14 VITALS
WEIGHT: 170.94 LBS | DIASTOLIC BLOOD PRESSURE: 58 MMHG | BODY MASS INDEX: 30.29 KG/M2 | SYSTOLIC BLOOD PRESSURE: 122 MMHG | HEIGHT: 63 IN

## 2019-02-14 DIAGNOSIS — I73.9 PVD (PERIPHERAL VASCULAR DISEASE) WITH CLAUDICATION: Primary | ICD-10-CM

## 2019-02-14 PROCEDURE — 99214 OFFICE O/P EST MOD 30 MIN: CPT | Mod: S$GLB,,, | Performed by: SURGERY

## 2019-02-14 PROCEDURE — 99999 PR PBB SHADOW E&M-EST. PATIENT-LVL III: CPT | Mod: PBBFAC,,, | Performed by: SURGERY

## 2019-02-14 PROCEDURE — 1101F PR PT FALLS ASSESS DOC 0-1 FALLS W/OUT INJ PAST YR: ICD-10-PCS | Mod: CPTII,S$GLB,, | Performed by: SURGERY

## 2019-02-14 PROCEDURE — 1101F PT FALLS ASSESS-DOCD LE1/YR: CPT | Mod: CPTII,S$GLB,, | Performed by: SURGERY

## 2019-02-14 PROCEDURE — 99999 PR PBB SHADOW E&M-EST. PATIENT-LVL III: ICD-10-PCS | Mod: PBBFAC,,, | Performed by: SURGERY

## 2019-02-14 PROCEDURE — 99214 PR OFFICE/OUTPT VISIT, EST, LEVL IV, 30-39 MIN: ICD-10-PCS | Mod: S$GLB,,, | Performed by: SURGERY

## 2019-02-14 NOTE — PATIENT INSTRUCTIONS
A Walking Program for Peripheral Arterial Disease (PAD)  Peripheral arterial disease (PAD) is a condition where the arteries in the legs are severely damaged. When you have PAD, walking can be painful. So you may start to walk less. Walking less makes your leg muscles weaker. It then becomes harder and more painful to walk. A walking program can break this cycle. This sheet gives you tips on how to get started.     Walking farther without pain  Exercise strengthens leg muscles that are out of shape. It also trains these muscles to work with less oxygen. This helps your leg muscles work better even with reduced blood flow to your legs. When you have PAD, a walking program can be helpful. Your program can:  · Let you walk longer and farther without claudication. This is an ache in your legs during exercise that goes away with rest.  · Let you do more and be more active  · Add to your overall health and well-being  · Help you control your blood sugar and blood pressure  · Help you become healthier with no risk and at little or no cost  Getting started  Your local hospital, vascular center, or cardiac rehab center may have a special walking program for people with PAD. If so, this is your best option. But if you cant find a program, or its not covered by insurance, you can still walk on your own. Follow these steps at each session:  · Step 1. Start at a pace that lets you walk for 5 to 10 minutes before you start to feel claudication. This feeling is unpleasant, but it doesnt hurt you. Keep going until the pain makes you feel that you need to stop.  · Step 2. Stop and rest for 3 to 5 minutes, just long enough for the pain to go away. You can rest standing or sitting. (Some people like to bring along a cane or a lightweight folding chair.)  · Step 3. Again walk at a pace that lets you walk for 5 to 10 minutes more before you feel pain. This may be slower than your starting pace in step 1. Then rest again.  · Step 4.  Repeat this process until youve walked for 45 minutes. This should be about 60 to 80 minutes total, including resting time. You may not be able to do a full 45 minutes at first. Do as much as you can, and increase your time as you improve.  Making the most of your program  · Walk at least 3 times a week. Take no more than 2 days off between sessions. If you stop walking, even for a week or two, you can lose the health benefits of your program.  · Find a good place to walk. A treadmill or a track may be better at first. That way, you wont run the risk of going too far and finding that you cant walk back. Be sure to have a place to walk indoors in bad weather, such as a gym or a mall.  · Wear shoes with sturdy, flexible soles. The heel should fit without slipping. You should have room to wiggle your toes.  · Keep track of how long you walk. A pedometer will show your daily progress. It can also show how much farther you can walk as time goes by.  · Ask a friend to keep you company. You may enjoy walking with someone else. Or you may want to make your walking sessions a time to relax by yourself.  · Make it fun. Listen to music while you walk and rest. Walk in a favorite park. Get to know the people at the gym, or the folks that you pass on your route. While you rest, you can window-shop, read, or feed the birds. Do whatever will help you enjoy your exercise sessions.  Date Last Reviewed: 6/1/2016 © 2000-2017 [employer]. [address] [place] [address], [place] [address]. All rights reserved. This information is not intended as a substitute for professional medical care. Always follow your healthcare professional's instructions.        Peripheral Artery Bypass Surgery  Surgery to bypass a blocked leg artery can ease your symptoms. The bypass is done with a special tube (graft) that directs blood around the blockage.  Attaching the graft  Peripheral bypass grafts carry blood from the femoral artery in your  thigh to an artery further down your leg. During the surgery, a graft is stitched into the artery above and below your blockage. This creates a new passage for blood flow. The blocked section of your artery is not removed. After the graft is in place, your doctor closes the cuts (incisions) in your skin with stitches or staples.  Types of grafts  · A vein from your own legs or arms can be used as a blood vessel graft. Blood vessel grafts often come from your own leg. Vein grafts work better in long leg blockages that start from your groin and extend below your knee.  · Manmade (synthetic) grafts are materials that your body easily accepts. These grafts work best on arteries at or above the knee.   Types of peripheral bypasses  The type of bypass depends on where your artery is blocked.    Risks and complications  · Bleeding or blood clots  · Urgent need for surgery again if graft is blocked by clot or debris  · Graft blockage  · Heart attack or stroke  · Breathing problems  · Infection  · Need for second bypass or surgery to remove dead tissue (amputation)  · Nerve damage and numbness  · Complications from anesthesia   Date Last Reviewed: 6/1/2016  © 7447-8581 [employer]. [address] [place] [address], [place] [address]. All rights reserved. This information is not intended as a substitute for professional medical care. Always follow your healthcare professional's instructions.

## 2019-02-14 NOTE — LETTER
February 14, 2019        [place] [address] [facility] [address]             [facility] [place] [facility]  [address]  [facility] [address]  Phone: [phone]  Fax: [phone]   Patient: Nelsy Marino   MR Number: [id number]   YOB: 1940   Date of Visit: 2/14/2019       Dear Dr. Garay:    Thank you for referring Nelsy Marino to me for evaluation. Below are the relevant portions of my assessment and plan of care.            If you have questions, please do not hesitate to call me. I look forward to following Nelsy along with you.    Sincerely,      Danny Dunbar MD           CC  No Recipients

## 2019-02-22 ENCOUNTER — TELEPHONE (OUTPATIENT)
Dept: VASCULAR SURGERY | Facility: CLINIC | Age: 79
End: 2019-02-22

## 2019-02-22 NOTE — TELEPHONE ENCOUNTER
Called office to ask about surgery date. Explained that Dr. Dunbar has still not given a date for his wife's surgery yet. Explained would let Dr. Dunbar know that he had called and would call his wife as soon as a date was decided upon.

## 2019-02-24 DIAGNOSIS — I70.229 CRITICAL LOWER LIMB ISCHEMIA: Primary | ICD-10-CM

## 2019-02-25 ENCOUNTER — TELEPHONE (OUTPATIENT)
Dept: VASCULAR SURGERY | Facility: CLINIC | Age: 79
End: 2019-02-25

## 2019-02-25 NOTE — TELEPHONE ENCOUNTER
Call to patient to give her date for her surgery and the date and time of her preop appointment. Explained they would call the day before her surgery to give her a time. She stated understanding. Explained would also mail out appointment slip for preop appointment.

## 2019-03-13 ENCOUNTER — TELEPHONE (OUTPATIENT)
Dept: VASCULAR SURGERY | Facility: CLINIC | Age: 79
End: 2019-03-13

## 2019-03-13 ENCOUNTER — HOSPITAL ENCOUNTER (OUTPATIENT)
Dept: PREADMISSION TESTING | Facility: HOSPITAL | Age: 79
Discharge: HOME | End: 2019-03-13
Attending: SURGERY
Payer: MEDICARE

## 2019-03-13 VITALS
DIASTOLIC BLOOD PRESSURE: 69 MMHG | WEIGHT: 170 LBS | TEMPERATURE: 98 F | RESPIRATION RATE: 18 BRPM | BODY MASS INDEX: 30.12 KG/M2 | HEIGHT: 63 IN | HEART RATE: 80 BPM | SYSTOLIC BLOOD PRESSURE: 139 MMHG

## 2019-03-13 DIAGNOSIS — I70.229 CRITICAL LOWER LIMB ISCHEMIA: ICD-10-CM

## 2019-03-13 LAB
ANION GAP SERPL CALC-SCNC: 7 MMOL/L
APTT BLDCRRT: 31 SEC
BASOPHILS # BLD AUTO: 0.01 K/UL
BASOPHILS NFR BLD: 0.1 %
BUN SERPL-MCNC: 24 MG/DL
CALCIUM SERPL-MCNC: 10.4 MG/DL
CHLORIDE SERPL-SCNC: 105 MMOL/L
CO2 SERPL-SCNC: 25 MMOL/L
CREAT SERPL-MCNC: 1.3 MG/DL
DIFFERENTIAL METHOD: ABNORMAL
EOSINOPHIL # BLD AUTO: 0.3 K/UL
EOSINOPHIL NFR BLD: 3.8 %
ERYTHROCYTE [DISTWIDTH] IN BLOOD BY AUTOMATED COUNT: 15.4 %
EST. GFR  (AFRICAN AMERICAN): 45 ML/MIN/1.73 M^2
EST. GFR  (NON AFRICAN AMERICAN): 39 ML/MIN/1.73 M^2
GLUCOSE SERPL-MCNC: 189 MG/DL
HCT VFR BLD AUTO: 37.7 %
HGB BLD-MCNC: 11.7 G/DL
INR PPP: 1
LYMPHOCYTES # BLD AUTO: 1.8 K/UL
LYMPHOCYTES NFR BLD: 26.1 %
MAGNESIUM SERPL-MCNC: 2 MG/DL
MCH RBC QN AUTO: 24 PG
MCHC RBC AUTO-ENTMCNC: 31 G/DL
MCV RBC AUTO: 77 FL
MONOCYTES # BLD AUTO: 0.4 K/UL
MONOCYTES NFR BLD: 5.9 %
NEUTROPHILS # BLD AUTO: 4.4 K/UL
NEUTROPHILS NFR BLD: 64.1 %
PHOSPHATE SERPL-MCNC: 3.8 MG/DL
PLATELET # BLD AUTO: 249 K/UL
PMV BLD AUTO: 10.5 FL
POTASSIUM SERPL-SCNC: 4.4 MMOL/L
PROTHROMBIN TIME: 10.7 SEC
RBC # BLD AUTO: 4.87 M/UL
SODIUM SERPL-SCNC: 137 MMOL/L
WBC # BLD AUTO: 6.82 K/UL

## 2019-03-13 PROCEDURE — 85025 COMPLETE CBC W/AUTO DIFF WBC: CPT

## 2019-03-13 PROCEDURE — 36415 COLL VENOUS BLD VENIPUNCTURE: CPT

## 2019-03-13 PROCEDURE — 80048 BASIC METABOLIC PNL TOTAL CA: CPT

## 2019-03-13 PROCEDURE — 85730 THROMBOPLASTIN TIME PARTIAL: CPT

## 2019-03-13 PROCEDURE — 83735 ASSAY OF MAGNESIUM: CPT

## 2019-03-13 PROCEDURE — 85610 PROTHROMBIN TIME: CPT

## 2019-03-13 PROCEDURE — 84100 ASSAY OF PHOSPHORUS: CPT

## 2019-03-13 RX ORDER — INSULIN ASPART 100 [IU]/ML
50 INJECTION, SUSPENSION SUBCUTANEOUS NIGHTLY
Status: ON HOLD | COMMUNITY
End: 2023-03-17 | Stop reason: HOSPADM

## 2019-03-13 NOTE — DISCHARGE INSTRUCTIONS
"Your procedure  is scheduled for __3/20/2019_______.    Call [phone] between 2pm and 5pm on _3/19/2019_____to find out your arrival time for the day of surgery.    Report to Same Day Surgery Unit at ____ AM on the 2nd floor of the hospital.  Use the front entrance of the hospital.  The front doors of the hospital open promptly at 5:30am.  If you need wheelchair assistance, call [phone] from your cell phone, or call "0" from the courtesy phone in the lobby.    Important instructions:   Do not eat or drink after 12 midnight, including water.  It is okay to brush your teeth.  Do not have gum, candy or mints.     Take only these medications with a small swallow of water on the morning of your surgery ___amlodipine___________    Do not take any diabetic medication on the morning of surgery unless instructed to do so by your doctor or pre op nurse.    Stop taking Aspirin, Ibuprofen, Motrin and Aleve , Fish oil, and Vitamin E for at least 7 days before your surgery. You may use Tylenol unless otherwise instructed by your doctor.          (We will find out about Pletal and Aspirin from Dr Dunbar)       Please shower the night before and the morning of your surgery.        Use Hibiclens soap to your surgery site if instructed by your pre op nurse.   If your surgery is on your abdomen, be sure to wash your naval.  Be sure to rinse off Hibiclens after it is on your skin for several minutes.  Do not use Hibiclens on your face or genitals. Please place clean linens on your bed the night before surgery. Please wear fresh clean clothing after each shower.     No shaving of procedural area at least 4-5 days before surgery due to increased risk of skin irritation and/or possible infection.      You may be asked to take a third shower on arrival to Same Day Surgery depending on the type of surgery you are having.     Do not wear make- up, including mascara.     You may wear deodorant only.      Do not wear powder, body " lotion or perfume/cologne.     Do not wear any jewelry or have any metal on your body.     You will be asked to remove any dentures or partials for the procedure.     Please bring any documents given to you by your doctor.     If you are going home on the same day of surgery, you must arrange for a family member or a friend to drive you home.  Public transportation is prohibited.  You will not be able to drive home if you were given anesthesia or sedation.     Wear loose fitting clothes allowing for bandages.     Please leave money and valuables home.       You may bring your cell phone.     Call the doctor if fever or illness should occur before your surgery.    Call [phone] to contact us here if needed.

## 2019-03-13 NOTE — TELEPHONE ENCOUNTER
Call to Ms. Marino and explained that spoke with Dr. Dunbar and he wants her to stop taking her Pletal today. So explained after today to not take that medication until after her surgery. Also explained to stop taking her aspirin 48hr before her surgery. So the 17th would be her last dose of Aspirin. I did repeat this twice and explained if any questions about anything to call the office. Ms. Marino stated understanding.

## 2019-03-13 NOTE — PRE-PROCEDURE INSTRUCTIONS
Consents will be signed on day of surgery.  Nazanin with Dr Dunbar will notify patient if she needs to stop blood thinners.

## 2019-03-19 ENCOUNTER — ANESTHESIA EVENT (OUTPATIENT)
Dept: SURGERY | Facility: HOSPITAL | Age: 79
DRG: 271 | End: 2019-03-19
Payer: MEDICARE

## 2019-03-20 ENCOUNTER — ANESTHESIA (OUTPATIENT)
Dept: SURGERY | Facility: HOSPITAL | Age: 79
DRG: 271 | End: 2019-03-20
Payer: MEDICARE

## 2019-03-20 ENCOUNTER — HOSPITAL ENCOUNTER (INPATIENT)
Facility: HOSPITAL | Age: 79
LOS: 4 days | Discharge: HOME HEALTH | DRG: 271 | End: 2019-03-24
Attending: SURGERY | Admitting: SURGERY
Payer: MEDICARE

## 2019-03-20 DIAGNOSIS — I73.9 VASCULAR CLAUDICATION: ICD-10-CM

## 2019-03-20 DIAGNOSIS — E87.5 POTASSIUM (K) EXCESS: ICD-10-CM

## 2019-03-20 DIAGNOSIS — E11.8 TYPE 2 DIABETES MELLITUS WITH COMPLICATION, UNSPECIFIED WHETHER LONG TERM INSULIN USE: Chronic | ICD-10-CM

## 2019-03-20 DIAGNOSIS — I73.9 PVD (PERIPHERAL VASCULAR DISEASE): Primary | ICD-10-CM

## 2019-03-20 DIAGNOSIS — I50.32 DIASTOLIC CHF, CHRONIC: ICD-10-CM

## 2019-03-20 DIAGNOSIS — I70.229 CRITICAL LOWER LIMB ISCHEMIA: ICD-10-CM

## 2019-03-20 DIAGNOSIS — I10 ESSENTIAL HYPERTENSION: Chronic | ICD-10-CM

## 2019-03-20 PROBLEM — E11.9 TYPE 2 DIABETES MELLITUS: Chronic | Status: ACTIVE | Noted: 2019-03-20

## 2019-03-20 PROBLEM — Z86.718 HISTORY OF DVT (DEEP VEIN THROMBOSIS): Chronic | Status: ACTIVE | Noted: 2019-03-20

## 2019-03-20 PROBLEM — E11.9 DIABETES MELLITUS: Status: ACTIVE | Noted: 2019-03-20

## 2019-03-20 PROBLEM — E66.9 OBESITY (BMI 30-39.9): Status: ACTIVE | Noted: 2019-03-20

## 2019-03-20 PROBLEM — J96.01 ACUTE RESPIRATORY FAILURE WITH HYPOXIA: Status: ACTIVE | Noted: 2019-03-20

## 2019-03-20 LAB
ABO + RH BLD: NORMAL
ALLENS TEST: ABNORMAL
ALLENS TEST: ABNORMAL
ANION GAP SERPL CALC-SCNC: 7 MMOL/L
APTT BLDCRRT: 31.7 SEC
BASOPHILS # BLD AUTO: 0.01 K/UL
BASOPHILS NFR BLD: 0.1 %
BLD GP AB SCN CELLS X3 SERPL QL: NORMAL
BLD PROD TYP BPU: NORMAL
BLD PROD TYP BPU: NORMAL
BLOOD UNIT EXPIRATION DATE: NORMAL
BLOOD UNIT EXPIRATION DATE: NORMAL
BLOOD UNIT TYPE CODE: 7300
BLOOD UNIT TYPE CODE: 7300
BLOOD UNIT TYPE: NORMAL
BLOOD UNIT TYPE: NORMAL
BUN SERPL-MCNC: 14 MG/DL
CALCIUM SERPL-MCNC: 8 MG/DL
CHLORIDE SERPL-SCNC: 109 MMOL/L
CO2 SERPL-SCNC: 20 MMOL/L
CODING SYSTEM: NORMAL
CODING SYSTEM: NORMAL
CREAT SERPL-MCNC: 0.8 MG/DL
DELSYS: ABNORMAL
DELSYS: ABNORMAL
DIFFERENTIAL METHOD: ABNORMAL
DISPENSE STATUS: NORMAL
DISPENSE STATUS: NORMAL
EOSINOPHIL # BLD AUTO: 0.1 K/UL
EOSINOPHIL NFR BLD: 0.3 %
ERYTHROCYTE [DISTWIDTH] IN BLOOD BY AUTOMATED COUNT: 15.7 %
EST. GFR  (AFRICAN AMERICAN): >60 ML/MIN/1.73 M^2
EST. GFR  (NON AFRICAN AMERICAN): >60 ML/MIN/1.73 M^2
ESTIMATED AVG GLUCOSE: 232 MG/DL
FIO2: 36
FLOW: 2
FLOW: 4
GLUCOSE SERPL-MCNC: 221 MG/DL
HBA1C MFR BLD HPLC: 9.7 %
HCO3 UR-SCNC: 18.2 MMOL/L (ref 24–28)
HCO3 UR-SCNC: 19.4 MMOL/L (ref 24–28)
HCT VFR BLD AUTO: 29.8 %
HGB BLD-MCNC: 9.6 G/DL
INR PPP: 1.2
LYMPHOCYTES # BLD AUTO: 1.3 K/UL
LYMPHOCYTES NFR BLD: 7 %
MCH RBC QN AUTO: 25.9 PG
MCHC RBC AUTO-ENTMCNC: 32.2 G/DL
MCV RBC AUTO: 81 FL
MODE: ABNORMAL
MODE: ABNORMAL
MONOCYTES # BLD AUTO: 0.9 K/UL
MONOCYTES NFR BLD: 4.7 %
NEUTROPHILS # BLD AUTO: 16.8 K/UL
NEUTROPHILS NFR BLD: 88.4 %
PCO2 BLDA: 34.7 MMHG (ref 35–45)
PCO2 BLDA: 37.4 MMHG (ref 35–45)
PH SMN: 7.32 [PH] (ref 7.35–7.45)
PH SMN: 7.33 [PH] (ref 7.35–7.45)
PLATELET # BLD AUTO: 150 K/UL
PMV BLD AUTO: 9.7 FL
PO2 BLDA: 56 MMHG (ref 80–100)
PO2 BLDA: 85 MMHG (ref 80–100)
POC ACTIVATED CLOTTING TIME K: 120 SEC (ref 74–137)
POC ACTIVATED CLOTTING TIME K: 142 SEC (ref 74–137)
POC ACTIVATED CLOTTING TIME K: 197 SEC (ref 74–137)
POC ACTIVATED CLOTTING TIME K: 219 SEC (ref 74–137)
POC ACTIVATED CLOTTING TIME K: 246 SEC (ref 74–137)
POC ACTIVATED CLOTTING TIME K: 263 SEC (ref 74–137)
POC ACTIVATED CLOTTING TIME K: 285 SEC (ref 74–137)
POC BE: -6 MMOL/L
POC BE: -7 MMOL/L
POC SATURATED O2: 87 % (ref 95–100)
POC SATURATED O2: 96 % (ref 95–100)
POC TCO2: 19 MMOL/L (ref 23–27)
POC TCO2: 21 MMOL/L (ref 23–27)
POC TEMPERATURE: ABNORMAL
POCT GLUCOSE: 182 MG/DL (ref 70–110)
POCT GLUCOSE: 194 MG/DL (ref 70–110)
POCT GLUCOSE: 279 MG/DL (ref 70–110)
POTASSIUM SERPL-SCNC: 4.7 MMOL/L
PROTHROMBIN TIME: 12.2 SEC
RBC # BLD AUTO: 3.7 M/UL
SAMPLE: ABNORMAL
SAMPLE: NORMAL
SITE: ABNORMAL
SITE: ABNORMAL
SODIUM SERPL-SCNC: 136 MMOL/L
SP02: 95
SP02: 98
TRANS ERYTHROCYTES VOL PATIENT: NORMAL ML
TRANS ERYTHROCYTES VOL PATIENT: NORMAL ML
WBC # BLD AUTO: 19.06 K/UL

## 2019-03-20 PROCEDURE — 35665 BPG ILIOFEMORAL: CPT | Mod: 80,RT,, | Performed by: SURGERY

## 2019-03-20 PROCEDURE — 35665 PR BYPASS GRAFT OTHR,ILIOFEMORAL: ICD-10-PCS | Mod: RT,,, | Performed by: SURGERY

## 2019-03-20 PROCEDURE — 99900035 HC TECH TIME PER 15 MIN (STAT)

## 2019-03-20 PROCEDURE — C1768 GRAFT, VASCULAR: HCPCS | Performed by: SURGERY

## 2019-03-20 PROCEDURE — C1757 CATH, THROMBECTOMY/EMBOLECT: HCPCS | Performed by: SURGERY

## 2019-03-20 PROCEDURE — 71000033 HC RECOVERY, INTIAL HOUR: Performed by: SURGERY

## 2019-03-20 PROCEDURE — D9220A PRA ANESTHESIA: Mod: ANES,,, | Performed by: ANESTHESIOLOGY

## 2019-03-20 PROCEDURE — 20000000 HC ICU ROOM

## 2019-03-20 PROCEDURE — 27201423 OPTIME MED/SURG SUP & DEVICES STERILE SUPPLY: Performed by: SURGERY

## 2019-03-20 PROCEDURE — 25000003 PHARM REV CODE 250: Performed by: SURGERY

## 2019-03-20 PROCEDURE — 37000009 HC ANESTHESIA EA ADD 15 MINS: Performed by: SURGERY

## 2019-03-20 PROCEDURE — 63600175 PHARM REV CODE 636 W HCPCS: Performed by: ANESTHESIOLOGY

## 2019-03-20 PROCEDURE — 86850 RBC ANTIBODY SCREEN: CPT

## 2019-03-20 PROCEDURE — 63600175 PHARM REV CODE 636 W HCPCS: Performed by: INTERNAL MEDICINE

## 2019-03-20 PROCEDURE — 63600175 PHARM REV CODE 636 W HCPCS: Performed by: REGISTERED NURSE

## 2019-03-20 PROCEDURE — 82803 BLOOD GASES ANY COMBINATION: CPT

## 2019-03-20 PROCEDURE — 37799 UNLISTED PX VASCULAR SURGERY: CPT

## 2019-03-20 PROCEDURE — D9220A PRA ANESTHESIA: Mod: CRNA,,, | Performed by: REGISTERED NURSE

## 2019-03-20 PROCEDURE — 85025 COMPLETE CBC W/AUTO DIFF WBC: CPT

## 2019-03-20 PROCEDURE — D9220A PRA ANESTHESIA: ICD-10-PCS | Mod: ANES,,, | Performed by: ANESTHESIOLOGY

## 2019-03-20 PROCEDURE — 37000008 HC ANESTHESIA 1ST 15 MINUTES: Performed by: SURGERY

## 2019-03-20 PROCEDURE — D9220A PRA ANESTHESIA: ICD-10-PCS | Mod: CRNA,,, | Performed by: REGISTERED NURSE

## 2019-03-20 PROCEDURE — 85610 PROTHROMBIN TIME: CPT

## 2019-03-20 PROCEDURE — 83036 HEMOGLOBIN GLYCOSYLATED A1C: CPT

## 2019-03-20 PROCEDURE — 86920 COMPATIBILITY TEST SPIN: CPT

## 2019-03-20 PROCEDURE — 71000039 HC RECOVERY, EACH ADD'L HOUR: Performed by: SURGERY

## 2019-03-20 PROCEDURE — 63600175 PHARM REV CODE 636 W HCPCS: Performed by: SURGERY

## 2019-03-20 PROCEDURE — P9045 ALBUMIN (HUMAN), 5%, 250 ML: HCPCS | Mod: JG | Performed by: NURSE ANESTHETIST, CERTIFIED REGISTERED

## 2019-03-20 PROCEDURE — 63600175 PHARM REV CODE 636 W HCPCS: Performed by: NURSE ANESTHETIST, CERTIFIED REGISTERED

## 2019-03-20 PROCEDURE — P9021 RED BLOOD CELLS UNIT: HCPCS

## 2019-03-20 PROCEDURE — 25000003 PHARM REV CODE 250: Performed by: NURSE ANESTHETIST, CERTIFIED REGISTERED

## 2019-03-20 PROCEDURE — 82962 GLUCOSE BLOOD TEST: CPT | Performed by: SURGERY

## 2019-03-20 PROCEDURE — 25000003 PHARM REV CODE 250: Performed by: REGISTERED NURSE

## 2019-03-20 PROCEDURE — 35665 BPG ILIOFEMORAL: CPT | Mod: RT,,, | Performed by: SURGERY

## 2019-03-20 PROCEDURE — 36415 COLL VENOUS BLD VENIPUNCTURE: CPT

## 2019-03-20 PROCEDURE — S5571 INSULIN DISPOS PEN 3 ML: HCPCS | Performed by: INTERNAL MEDICINE

## 2019-03-20 PROCEDURE — 80048 BASIC METABOLIC PNL TOTAL CA: CPT

## 2019-03-20 PROCEDURE — 25000003 PHARM REV CODE 250: Performed by: ANESTHESIOLOGY

## 2019-03-20 PROCEDURE — 36000708 HC OR TIME LEV III 1ST 15 MIN: Performed by: SURGERY

## 2019-03-20 PROCEDURE — 85730 THROMBOPLASTIN TIME PARTIAL: CPT

## 2019-03-20 PROCEDURE — 27000190 HC CPAP FULL FACE MASK W/VALVE

## 2019-03-20 PROCEDURE — 36000709 HC OR TIME LEV III EA ADD 15 MIN: Performed by: SURGERY

## 2019-03-20 PROCEDURE — 35665 PR BYPASS GRAFT OTHR,ILIOFEMORAL: ICD-10-PCS | Mod: 80,RT,, | Performed by: SURGERY

## 2019-03-20 PROCEDURE — 27000221 HC OXYGEN, UP TO 24 HOURS

## 2019-03-20 DEVICE — IMPLANTABLE DEVICE: Type: IMPLANTABLE DEVICE | Site: ARTERIAL | Status: FUNCTIONAL

## 2019-03-20 RX ORDER — ONDANSETRON 2 MG/ML
4 INJECTION INTRAMUSCULAR; INTRAVENOUS DAILY PRN
Status: DISCONTINUED | OUTPATIENT
Start: 2019-03-20 | End: 2019-03-20 | Stop reason: HOSPADM

## 2019-03-20 RX ORDER — HYDROMORPHONE HYDROCHLORIDE 2 MG/ML
0.2 INJECTION, SOLUTION INTRAMUSCULAR; INTRAVENOUS; SUBCUTANEOUS EVERY 4 HOURS PRN
Status: DISCONTINUED | OUTPATIENT
Start: 2019-03-20 | End: 2019-03-21 | Stop reason: SDUPTHER

## 2019-03-20 RX ORDER — HYDRALAZINE HYDROCHLORIDE 20 MG/ML
10 INJECTION INTRAMUSCULAR; INTRAVENOUS EVERY 6 HOURS PRN
Status: DISCONTINUED | OUTPATIENT
Start: 2019-03-20 | End: 2019-03-21

## 2019-03-20 RX ORDER — AMLODIPINE BESYLATE 5 MG/1
10 TABLET ORAL DAILY
Status: DISCONTINUED | OUTPATIENT
Start: 2019-03-21 | End: 2019-03-24 | Stop reason: HOSPADM

## 2019-03-20 RX ORDER — PHENYLEPHRINE HYDROCHLORIDE 10 MG/ML
INJECTION INTRAVENOUS
Status: DISCONTINUED | OUTPATIENT
Start: 2019-03-20 | End: 2019-03-20

## 2019-03-20 RX ORDER — ALBUMIN HUMAN 50 G/1000ML
SOLUTION INTRAVENOUS CONTINUOUS PRN
Status: DISCONTINUED | OUTPATIENT
Start: 2019-03-20 | End: 2019-03-20

## 2019-03-20 RX ORDER — SUCCINYLCHOLINE CHLORIDE 20 MG/ML
INJECTION INTRAMUSCULAR; INTRAVENOUS
Status: DISCONTINUED | OUTPATIENT
Start: 2019-03-20 | End: 2019-03-20

## 2019-03-20 RX ORDER — HYDROCODONE BITARTRATE AND ACETAMINOPHEN 500; 5 MG/1; MG/1
TABLET ORAL
Status: DISCONTINUED | OUTPATIENT
Start: 2019-03-20 | End: 2019-03-20 | Stop reason: HOSPADM

## 2019-03-20 RX ORDER — HYDROCODONE BITARTRATE AND ACETAMINOPHEN 5; 325 MG/1; MG/1
1 TABLET ORAL EVERY 4 HOURS PRN
Status: DISCONTINUED | OUTPATIENT
Start: 2019-03-20 | End: 2019-03-21 | Stop reason: SDUPTHER

## 2019-03-20 RX ORDER — GLUCAGON 1 MG
1 KIT INJECTION
Status: DISCONTINUED | OUTPATIENT
Start: 2019-03-20 | End: 2019-03-24 | Stop reason: HOSPADM

## 2019-03-20 RX ORDER — IBUPROFEN 200 MG
16 TABLET ORAL
Status: DISCONTINUED | OUTPATIENT
Start: 2019-03-20 | End: 2019-03-24 | Stop reason: HOSPADM

## 2019-03-20 RX ORDER — CEFAZOLIN SODIUM 2 G/50ML
2 SOLUTION INTRAVENOUS
Status: COMPLETED | OUTPATIENT
Start: 2019-03-20 | End: 2019-03-21

## 2019-03-20 RX ORDER — PROPOFOL 10 MG/ML
VIAL (ML) INTRAVENOUS
Status: DISCONTINUED | OUTPATIENT
Start: 2019-03-20 | End: 2019-03-20

## 2019-03-20 RX ORDER — LOSARTAN POTASSIUM AND HYDROCHLOROTHIAZIDE 12.5; 5 MG/1; MG/1
2 TABLET ORAL DAILY
Status: DISCONTINUED | OUTPATIENT
Start: 2019-03-21 | End: 2019-03-24 | Stop reason: HOSPADM

## 2019-03-20 RX ORDER — CISATRACURIUM BESYLATE 10 MG/ML
INJECTION, SOLUTION INTRAVENOUS
Status: DISCONTINUED | OUTPATIENT
Start: 2019-03-20 | End: 2019-03-20

## 2019-03-20 RX ORDER — CLONIDINE HYDROCHLORIDE 0.1 MG/1
0.1 TABLET ORAL 3 TIMES DAILY PRN
Status: DISCONTINUED | OUTPATIENT
Start: 2019-03-20 | End: 2019-03-21

## 2019-03-20 RX ORDER — INSULIN ASPART 100 [IU]/ML
5 INJECTION, SOLUTION INTRAVENOUS; SUBCUTANEOUS
Status: DISCONTINUED | OUTPATIENT
Start: 2019-03-21 | End: 2019-03-22

## 2019-03-20 RX ORDER — ACETAMINOPHEN 325 MG/1
650 TABLET ORAL EVERY 4 HOURS PRN
Status: DISCONTINUED | OUTPATIENT
Start: 2019-03-20 | End: 2019-03-24 | Stop reason: HOSPADM

## 2019-03-20 RX ORDER — SODIUM CHLORIDE, SODIUM LACTATE, POTASSIUM CHLORIDE, CALCIUM CHLORIDE 600; 310; 30; 20 MG/100ML; MG/100ML; MG/100ML; MG/100ML
INJECTION, SOLUTION INTRAVENOUS CONTINUOUS
Status: DISCONTINUED | OUTPATIENT
Start: 2019-03-20 | End: 2019-03-20

## 2019-03-20 RX ORDER — NEOSTIGMINE METHYLSULFATE 1 MG/ML
INJECTION, SOLUTION INTRAVENOUS
Status: DISCONTINUED | OUTPATIENT
Start: 2019-03-20 | End: 2019-03-20

## 2019-03-20 RX ORDER — ONDANSETRON 2 MG/ML
4 INJECTION INTRAMUSCULAR; INTRAVENOUS EVERY 6 HOURS PRN
Status: DISCONTINUED | OUTPATIENT
Start: 2019-03-20 | End: 2019-03-22

## 2019-03-20 RX ORDER — LIDOCAINE HYDROCHLORIDE 10 MG/ML
1 INJECTION, SOLUTION EPIDURAL; INFILTRATION; INTRACAUDAL; PERINEURAL ONCE
Status: DISCONTINUED | OUTPATIENT
Start: 2019-03-20 | End: 2019-03-20 | Stop reason: HOSPADM

## 2019-03-20 RX ORDER — GLYCOPYRROLATE 0.2 MG/ML
INJECTION INTRAMUSCULAR; INTRAVENOUS
Status: DISCONTINUED | OUTPATIENT
Start: 2019-03-20 | End: 2019-03-20

## 2019-03-20 RX ORDER — CEFAZOLIN SODIUM 1 G/3ML
INJECTION, POWDER, FOR SOLUTION INTRAMUSCULAR; INTRAVENOUS
Status: DISCONTINUED | OUTPATIENT
Start: 2019-03-20 | End: 2019-03-20

## 2019-03-20 RX ORDER — FENTANYL CITRATE 50 UG/ML
INJECTION, SOLUTION INTRAMUSCULAR; INTRAVENOUS
Status: DISCONTINUED | OUTPATIENT
Start: 2019-03-20 | End: 2019-03-20

## 2019-03-20 RX ORDER — LIDOCAINE HCL/PF 100 MG/5ML
SYRINGE (ML) INTRAVENOUS
Status: DISCONTINUED | OUTPATIENT
Start: 2019-03-20 | End: 2019-03-20

## 2019-03-20 RX ORDER — MUPIROCIN 20 MG/G
1 OINTMENT TOPICAL 2 TIMES DAILY
Status: DISCONTINUED | OUTPATIENT
Start: 2019-03-20 | End: 2019-03-24 | Stop reason: HOSPADM

## 2019-03-20 RX ORDER — IBUPROFEN 200 MG
24 TABLET ORAL
Status: DISCONTINUED | OUTPATIENT
Start: 2019-03-20 | End: 2019-03-24 | Stop reason: HOSPADM

## 2019-03-20 RX ORDER — SODIUM CHLORIDE 9 MG/ML
INJECTION, SOLUTION INTRAVENOUS CONTINUOUS PRN
Status: DISCONTINUED | OUTPATIENT
Start: 2019-03-20 | End: 2019-03-20

## 2019-03-20 RX ORDER — ONDANSETRON 2 MG/ML
4 INJECTION INTRAMUSCULAR; INTRAVENOUS EVERY 12 HOURS PRN
Status: DISCONTINUED | OUTPATIENT
Start: 2019-03-20 | End: 2019-03-22

## 2019-03-20 RX ORDER — PROTAMINE SULFATE 10 MG/ML
INJECTION, SOLUTION INTRAVENOUS
Status: DISCONTINUED | OUTPATIENT
Start: 2019-03-20 | End: 2019-03-20

## 2019-03-20 RX ORDER — HYDROMORPHONE HYDROCHLORIDE 2 MG/ML
0.2 INJECTION, SOLUTION INTRAMUSCULAR; INTRAVENOUS; SUBCUTANEOUS EVERY 5 MIN PRN
Status: DISCONTINUED | OUTPATIENT
Start: 2019-03-20 | End: 2019-03-20

## 2019-03-20 RX ORDER — METOPROLOL TARTRATE 1 MG/ML
5 INJECTION, SOLUTION INTRAVENOUS EVERY 5 MIN PRN
Status: DISCONTINUED | OUTPATIENT
Start: 2019-03-20 | End: 2019-03-21

## 2019-03-20 RX ORDER — BACITRACIN 50000 [IU]/1
INJECTION, POWDER, FOR SOLUTION INTRAMUSCULAR
Status: DISCONTINUED | OUTPATIENT
Start: 2019-03-20 | End: 2019-03-20 | Stop reason: HOSPADM

## 2019-03-20 RX ORDER — SODIUM CHLORIDE 0.9 % (FLUSH) 0.9 %
3 SYRINGE (ML) INJECTION
Status: DISCONTINUED | OUTPATIENT
Start: 2019-03-20 | End: 2019-03-20 | Stop reason: HOSPADM

## 2019-03-20 RX ORDER — HYDROMORPHONE HCL IN 0.9% NACL 6 MG/30 ML
PATIENT CONTROLLED ANALGESIA SYRINGE INTRAVENOUS CONTINUOUS
Status: DISCONTINUED | OUTPATIENT
Start: 2019-03-20 | End: 2019-03-21

## 2019-03-20 RX ORDER — ROCURONIUM BROMIDE 10 MG/ML
INJECTION, SOLUTION INTRAVENOUS
Status: DISCONTINUED | OUTPATIENT
Start: 2019-03-20 | End: 2019-03-20

## 2019-03-20 RX ORDER — SODIUM CHLORIDE, SODIUM LACTATE, POTASSIUM CHLORIDE, CALCIUM CHLORIDE 600; 310; 30; 20 MG/100ML; MG/100ML; MG/100ML; MG/100ML
INJECTION, SOLUTION INTRAVENOUS CONTINUOUS
Status: DISCONTINUED | OUTPATIENT
Start: 2019-03-20 | End: 2019-03-21

## 2019-03-20 RX ORDER — INSULIN ASPART 100 [IU]/ML
0-5 INJECTION, SOLUTION INTRAVENOUS; SUBCUTANEOUS
Status: DISCONTINUED | OUTPATIENT
Start: 2019-03-20 | End: 2019-03-24 | Stop reason: HOSPADM

## 2019-03-20 RX ORDER — HEPARIN SODIUM 1000 [USP'U]/ML
INJECTION, SOLUTION INTRAVENOUS; SUBCUTANEOUS
Status: DISCONTINUED | OUTPATIENT
Start: 2019-03-20 | End: 2019-03-20 | Stop reason: HOSPADM

## 2019-03-20 RX ORDER — NALOXONE HCL 0.4 MG/ML
0.02 VIAL (ML) INJECTION
Status: DISCONTINUED | OUTPATIENT
Start: 2019-03-20 | End: 2019-03-24 | Stop reason: HOSPADM

## 2019-03-20 RX ADMIN — HEPARIN SODIUM 3000 UNITS: 1000 INJECTION, SOLUTION INTRAVENOUS; SUBCUTANEOUS at 02:03

## 2019-03-20 RX ADMIN — CISATRACURIUM BESYLATE 2 MG: 10 INJECTION INTRAVENOUS at 03:03

## 2019-03-20 RX ADMIN — CEFAZOLIN 2 G: 1 INJECTION, POWDER, FOR SOLUTION INTRAVENOUS at 12:03

## 2019-03-20 RX ADMIN — PHENYLEPHRINE HYDROCHLORIDE 200 MCG: 10 INJECTION INTRAVENOUS at 05:03

## 2019-03-20 RX ADMIN — ONDANSETRON 4 MG: 2 INJECTION INTRAMUSCULAR; INTRAVENOUS at 09:03

## 2019-03-20 RX ADMIN — PROPOFOL 50 MG: 10 INJECTION, EMULSION INTRAVENOUS at 04:03

## 2019-03-20 RX ADMIN — PHENYLEPHRINE HYDROCHLORIDE 200 MCG: 10 INJECTION INTRAVENOUS at 04:03

## 2019-03-20 RX ADMIN — PHENYLEPHRINE HYDROCHLORIDE 200 MCG: 10 INJECTION INTRAVENOUS at 12:03

## 2019-03-20 RX ADMIN — PHENYLEPHRINE HYDROCHLORIDE 200 MCG: 10 INJECTION INTRAVENOUS at 03:03

## 2019-03-20 RX ADMIN — INSULIN DETEMIR 15 UNITS: 100 INJECTION, SOLUTION SUBCUTANEOUS at 09:03

## 2019-03-20 RX ADMIN — SODIUM CHLORIDE, SODIUM LACTATE, POTASSIUM CHLORIDE, AND CALCIUM CHLORIDE: .6; .31; .03; .02 INJECTION, SOLUTION INTRAVENOUS at 07:03

## 2019-03-20 RX ADMIN — ROCURONIUM BROMIDE 50 MG: 10 INJECTION, SOLUTION INTRAVENOUS at 12:03

## 2019-03-20 RX ADMIN — PHENYLEPHRINE HYDROCHLORIDE 100 MCG: 10 INJECTION INTRAVENOUS at 03:03

## 2019-03-20 RX ADMIN — CEFAZOLIN SODIUM 2 G: 2 SOLUTION INTRAVENOUS at 08:03

## 2019-03-20 RX ADMIN — SODIUM CHLORIDE, SODIUM LACTATE, POTASSIUM CHLORIDE, AND CALCIUM CHLORIDE: .6; .31; .03; .02 INJECTION, SOLUTION INTRAVENOUS at 11:03

## 2019-03-20 RX ADMIN — ALBUMIN HUMAN: 0.05 INJECTION, SOLUTION INTRAVENOUS at 04:03

## 2019-03-20 RX ADMIN — HYDROMORPHONE HYDROCHLORIDE 0.2 MG: 2 INJECTION, SOLUTION INTRAMUSCULAR; INTRAVENOUS; SUBCUTANEOUS at 06:03

## 2019-03-20 RX ADMIN — FENTANYL CITRATE 50 MCG: 50 INJECTION INTRAMUSCULAR; INTRAVENOUS at 03:03

## 2019-03-20 RX ADMIN — PHENYLEPHRINE HYDROCHLORIDE 200 MCG: 10 INJECTION INTRAVENOUS at 01:03

## 2019-03-20 RX ADMIN — PROTAMINE SULFATE 45 MG: 10 INJECTION, SOLUTION INTRAVENOUS at 04:03

## 2019-03-20 RX ADMIN — PHENYLEPHRINE HYDROCHLORIDE 80 MCG: 10 INJECTION INTRAVENOUS at 12:03

## 2019-03-20 RX ADMIN — SODIUM CHLORIDE, SODIUM LACTATE, POTASSIUM CHLORIDE, AND CALCIUM CHLORIDE: .6; .31; .03; .02 INJECTION, SOLUTION INTRAVENOUS at 05:03

## 2019-03-20 RX ADMIN — INSULIN ASPART 1 UNITS: 100 INJECTION, SOLUTION INTRAVENOUS; SUBCUTANEOUS at 09:03

## 2019-03-20 RX ADMIN — GLYCOPYRROLATE 0.5 MG: 0.2 INJECTION, SOLUTION INTRAMUSCULAR; INTRAVENOUS at 05:03

## 2019-03-20 RX ADMIN — SODIUM CHLORIDE, SODIUM LACTATE, POTASSIUM CHLORIDE, AND CALCIUM CHLORIDE: .6; .31; .03; .02 INJECTION, SOLUTION INTRAVENOUS at 04:03

## 2019-03-20 RX ADMIN — NEOSTIGMINE METHYLSULFATE 5 MG: 1 INJECTION INTRAVENOUS at 05:03

## 2019-03-20 RX ADMIN — HEPARIN SODIUM 5000 UNITS: 1000 INJECTION, SOLUTION INTRAVENOUS; SUBCUTANEOUS at 03:03

## 2019-03-20 RX ADMIN — MUPIROCIN 1 G: 20 OINTMENT TOPICAL at 08:03

## 2019-03-20 RX ADMIN — FENTANYL CITRATE 100 MCG: 50 INJECTION INTRAMUSCULAR; INTRAVENOUS at 11:03

## 2019-03-20 RX ADMIN — FENTANYL CITRATE 50 MCG: 50 INJECTION INTRAMUSCULAR; INTRAVENOUS at 01:03

## 2019-03-20 RX ADMIN — CISATRACURIUM BESYLATE 2 MG: 10 INJECTION INTRAVENOUS at 02:03

## 2019-03-20 RX ADMIN — SODIUM CHLORIDE, SODIUM LACTATE, POTASSIUM CHLORIDE, AND CALCIUM CHLORIDE: .6; .31; .03; .02 INJECTION, SOLUTION INTRAVENOUS at 01:03

## 2019-03-20 RX ADMIN — HEPARIN SODIUM 7000 UNITS: 1000 INJECTION, SOLUTION INTRAVENOUS; SUBCUTANEOUS at 01:03

## 2019-03-20 RX ADMIN — FENTANYL CITRATE 50 MCG: 50 INJECTION INTRAMUSCULAR; INTRAVENOUS at 04:03

## 2019-03-20 RX ADMIN — Medication: at 06:03

## 2019-03-20 RX ADMIN — CISATRACURIUM BESYLATE 6 MG: 10 INJECTION INTRAVENOUS at 01:03

## 2019-03-20 RX ADMIN — PHENYLEPHRINE HYDROCHLORIDE 100 MCG: 10 INJECTION INTRAVENOUS at 02:03

## 2019-03-20 RX ADMIN — SUCCINYLCHOLINE CHLORIDE 100 MG: 20 INJECTION, SOLUTION INTRAMUSCULAR; INTRAVENOUS at 11:03

## 2019-03-20 RX ADMIN — CISATRACURIUM BESYLATE 2 MG: 10 INJECTION INTRAVENOUS at 04:03

## 2019-03-20 RX ADMIN — ALBUMIN HUMAN: 0.05 INJECTION, SOLUTION INTRAVENOUS at 03:03

## 2019-03-20 RX ADMIN — LIDOCAINE HYDROCHLORIDE 100 MG: 20 INJECTION, SOLUTION INTRAVENOUS at 11:03

## 2019-03-20 RX ADMIN — CISATRACURIUM BESYLATE 2 MG: 10 INJECTION INTRAVENOUS at 01:03

## 2019-03-20 RX ADMIN — PROTAMINE SULFATE 5 MG: 10 INJECTION, SOLUTION INTRAVENOUS at 04:03

## 2019-03-20 RX ADMIN — PROPOFOL 100 MG: 10 INJECTION, EMULSION INTRAVENOUS at 11:03

## 2019-03-20 RX ADMIN — SODIUM CHLORIDE: 0.9 INJECTION, SOLUTION INTRAVENOUS at 11:03

## 2019-03-20 RX ADMIN — SODIUM CHLORIDE: 0.9 INJECTION, SOLUTION INTRAVENOUS at 04:03

## 2019-03-20 NOTE — PROGRESS NOTES
Pt placed on BIPAP in PACU 10/5 and 30% with NARN. Will monitor pt status and wean as tolerated. Pt face and nose checked for redness or breakdown, none noted at this time.

## 2019-03-20 NOTE — ANESTHESIA PREPROCEDURE EVALUATION
"03/20/2019    Pre-operative evaluation for Procedure(s) (LRB):  CREATION, BYPASS, ARTERIAL, ILIAC TO FEMORAL, RIGHT LOWER EXTREMITY, RIGHT PROFUNDAPLASTY (Right)    Nelsy Marino is a 78 y.o. female     Patient Active Problem List   Diagnosis    Lichen sclerosus et atrophicus    PVD (peripheral vascular disease)       Review of patient's allergies indicates:   Allergen Reactions    Motrin [ibuprofen] Itching       No current facility-administered medications on file prior to encounter.      Current Outpatient Medications on File Prior to Encounter   Medication Sig Dispense Refill    acetaminophen (TYLENOL) 500 MG tablet Take 500 mg by mouth once daily.      amlodipine (NORVASC) 10 MG tablet Take 10 mg by mouth once daily.      BLOOD SUGAR DIAGNOSTIC (TRUE METRIX GLUCOSE TEST STRIP MISC) by Misc.(Non-Drug; Combo Route) route.      glipiZIDE (GLUCOTROL) 10 MG tablet Take 10 mg by mouth 2 (two) times daily before meals.      insulin aspart protamine-insulin aspart (NOVOLOG 70/30) 100 unit/mL (70-30) InPn pen Inject 45 Units into the skin every morning.       insulin syringe-needle,dispos. 0.3 mL 30 gauge x 5/16" Syrg by Misc.(Non-Drug; Combo Route) route.      losartan-hydrochlorothiazide 100-25 mg (HYZAAR) 100-25 mg per tablet Take 1 tablet by mouth once daily.      aspirin (ECOTRIN) 81 MG EC tablet Take 81 mg by mouth once daily.      cilostazol (PLETAL) 50 MG Tab Take 1 tablet (50 mg total) by mouth 2 (two) times daily. If patient tolerate medication after 4 weeks, increase dose to 100 mg twice daily. 60 tablet 11    diphenhydrAMINE (BENADRYL) 25 mg capsule Take 25 mg by mouth every 6 (six) hours as needed for Itching.      metformin (GLUCOPHAGE) 1000 MG tablet Take 1,000 mg by mouth 2 (two) times daily with meals.         Past Surgical History:   Procedure Laterality Date     femoral vascular surgery       HYSTERECTOMY      ?unsure cervix present       Social History     Socioeconomic History    " Marital status:      Spouse name: Not on file    Number of children: Not on file    Years of education: Not on file    Highest education level: Not on file   Social Needs    Financial resource strain: Not on file    Food insecurity - worry: Not on file    Food insecurity - inability: Not on file    Transportation needs - medical: Not on file    Transportation needs - non-medical: Not on file   Occupational History    Not on file   Tobacco Use    Smoking status: Former Smoker     Last attempt to quit: 1980     Years since quittin.2    Smokeless tobacco: Never Used   Substance and Sexual Activity    Alcohol use: No    Drug use: No    Sexual activity: No   Other Topics Concern    Not on file   Social History Narrative    Not on file       Lab Results   Component Value Date    WBC 6.82 2019    HGB 11.7 (L) 2019    HCT 37.7 2019    MCV 77 (L) 2019     2019     BMP  Lab Results   Component Value Date     2019    K 4.4 2019     2019    CO2 25 2019    BUN 24 (H) 2019    CREATININE 1.3 2019    CALCIUM 10.4 2019    ANIONGAP 7 (L) 2019    ESTGFRAFRICA 45 (A) 2019    EGFRNONAA 39 (A) 2019       Anesthesia Evaluation     I have reviewed the Nursing Notes.      Review of Systems  Anesthesia Hx:  No problems with previous Anesthesia    Social:  Former Smoker    Cardiovascular:   Denies Pacemaker. Hypertension  Denies Valvular problems/Murmurs.  Denies MI.  Denies CAD.    Denies CABG/stent.  Denies Dysrhythmias.   Denies Angina. CHF         PVD    Pulmonary:  Pulmonary Normal    Renal/:  Renal/ Normal     Hepatic/GI:  Hepatic/GI Normal    Neurological:  Neurology Normal    Endocrine:   Diabetes, type 2, using insulin Denies Hypothyroidism. Denies Hyperthyroidism.        Physical Exam  General:  Obesity    Airway/Jaw/Neck:  AIRWAY FINDINGS: Normal      Chest/Lungs:  Chest/Lungs Clear     Heart/Vascular:  Heart Findings: Normal       Mental Status:  Mental Status Findings: Normal        Anesthesia Plan  Type of Anesthesia, risks & benefits discussed:  Anesthesia Type:  general  Patient's Preference:   Intra-op Monitoring Plan: standard ASA monitors  Intra-op Monitoring Plan Comments:   Post Op Pain Control Plan:   Post Op Pain Control Plan Comments:   Induction:   IV  Beta Blocker:  Patient is not currently on a Beta-Blocker (No further documentation required).       Informed Consent: Patient understands risks and agrees with Anesthesia plan.  Questions answered. Anesthesia consent signed with patient.  ASA Score: 3     Day of Surgery Review of History & Physical:    H&P update referred to the provider.         Ready For Surgery From Anesthesia Perspective.

## 2019-03-20 NOTE — ASSESSMENT & PLAN NOTE
-s/p R iliofemoral bypass 3/20/19 - admit to ICU for critical care   -IVF  -Diabetic clear liquids once awake and able to sit up  -PCA per Anesthesia  -Hospitalist consult for optimization of medical comorbidities  -Strict I/O  -RLE neurovascular checks  -Postop labs

## 2019-03-20 NOTE — H&P
[facility]  Vascular Surgery  History and Physical     Patient Name: Nelsy Marino  MRN: [id number]  Admission Date: 3/20/2019  Code Status: No Order   Attending Physician: Bettina  Primary Care Physician: Juan F Garay    Subjective:     Chief Complaint/Reason for Admission: Postoperative management     HPI:            Danny Dunbar MD RPVI                       [facility]                         2019    HPI:  Nelsy Marino is a 78 y.o. female with   Patient Active Problem List   Diagnosis    Lichen sclerosus et atrophicus    PVD (peripheral vascular disease)    being managed by PCP and specialists who is being admitted postoperatively s/p R iliofemoral bypass 3/20/19.  Recovering well on BIPAP.  No complaints.  Receiving IVF and received 2u PRBC in OR.  Pain controlled.      Past Medical History:   Diagnosis Date    CHF (congestive heart failure)     Diabetes mellitus     DVT (deep venous thrombosis)     Hypertension     Miscarriage      Past Surgical History:   Procedure Laterality Date     femoral vascular surgery       HYSTERECTOMY      ?unsure cervix present     Family History   Problem Relation Age of Onset    Diabetes Father     Hypertension Father      Social History     Socioeconomic History    Marital status:      Spouse name: Not on file    Number of children: Not on file    Years of education: Not on file    Highest education level: Not on file   Social Needs    Financial resource strain: Not on file    Food insecurity - worry: Not on file    Food insecurity - inability: Not on file    Transportation needs - medical: Not on file    Transportation needs - non-medical: Not on file   Occupational History    Not on file   Tobacco Use    Smoking status: Former Smoker     Last attempt to quit: 1980     Years since quittin.2    Smokeless tobacco: Never Used   Substance and Sexual Activity    Alcohol use: No    Drug use: No  "   Sexual activity: No   Other Topics Concern    Not on file   Social History Narrative    Not on file       Current Facility-Administered Medications:     0.9%  NaCl infusion (for blood administration), , Intravenous, Q24H PRN, Danny Dunbar MD    hydrALAZINE injection 10 mg, 10 mg, Intravenous, Q6H PRN, Danny Dunbar MD    HYDROmorphone PCA in 0.9 % NaCl 6 Mg/30 mL (0.2 mg/mL), , Intravenous, Continuous, Kyle Ellington MD    lactated ringers infusion, , Intravenous, Continuous, Shani Duran MD    lidocaine (PF) 10 mg/ml (1%) injection 10 mg, 1 mL, Intradermal, Once, Shani Duran MD    metoprolol injection 5 mg, 5 mg, Intravenous, Q5 Min PRN, Danny Dunbar MD    naloxone 0.4 mg/mL injection 0.02 mg, 0.02 mg, Intravenous, PRN, Kyle Ellington MD    ondansetron injection 4 mg, 4 mg, Intravenous, Daily PRN, Kyle Ellington MD    ondansetron injection 4 mg, 4 mg, Intravenous, Q12H PRN, Kyle Ellington MD    sodium chloride 0.9% flush 3 mL, 3 mL, Intravenous, PRN, Kyle Ellington MD      Medications Prior to Admission   Medication Sig Dispense Refill Last Dose    acetaminophen (TYLENOL) 500 MG tablet Take 500 mg by mouth once daily.   3/19/2019 at 1300    amlodipine (NORVASC) 10 MG tablet Take 10 mg by mouth once daily.   3/20/2019 at 0530    BLOOD SUGAR DIAGNOSTIC (TRUE METRIX GLUCOSE TEST STRIP MISC) by Misc.(Non-Drug; Combo Route) route.   3/20/2019 at Unknown time    glipiZIDE (GLUCOTROL) 10 MG tablet Take 10 mg by mouth 2 (two) times daily before meals.   3/19/2019 at 0800    insulin aspart protamine-insulin aspart (NOVOLOG 70/30) 100 unit/mL (70-30) InPn pen Inject 45 Units into the skin every morning.    3/19/2019 at 0630    insulin aspart protamine-insulin aspart (NOVOLOG MIX 70-30FLEXPEN U-100) 100 unit/mL (70-30) InPn pen Inject 40 Units into the skin every evening.   3/19/2019 at 1900    insulin syringe-needle,dispos. 0.3 mL 30 gauge x 5/16" " Syrg by Misc.(Non-Drug; Combo Route) route.   3/19/2019 at Unknown time    losartan-hydrochlorothiazide 100-25 mg (HYZAAR) 100-25 mg per tablet Take 1 tablet by mouth once daily.   3/19/2019 at 0730    aspirin (ECOTRIN) 81 MG EC tablet Take 81 mg by mouth once daily.   3/16/2019    cilostazol (PLETAL) 50 MG Tab Take 1 tablet (50 mg total) by mouth 2 (two) times daily. If patient tolerate medication after 4 weeks, increase dose to 100 mg twice daily. 60 tablet 11 3/16/2019    diphenhydrAMINE (BENADRYL) 25 mg capsule Take 25 mg by mouth every 6 (six) hours as needed for Itching.   3/18/2019    metformin (GLUCOPHAGE) 1000 MG tablet Take 1,000 mg by mouth 2 (two) times daily with meals.   3/18/2019       Review of patient's allergies indicates:   Allergen Reactions    Motrin [ibuprofen] Itching       Past Medical History:   Diagnosis Date    CHF (congestive heart failure)     Diabetes mellitus     DVT (deep venous thrombosis)     Hypertension     Miscarriage      Past Surgical History:   Procedure Laterality Date     femoral vascular surgery       HYSTERECTOMY      ?unsure cervix present     Family History     Problem Relation (Age of Onset)    Diabetes Father    Hypertension Father        Tobacco Use    Smoking status: Former Smoker     Last attempt to quit: 1980     Years since quittin.2    Smokeless tobacco: Never Used   Substance and Sexual Activity    Alcohol use: No    Drug use: No    Sexual activity: No     Review of Systems   Constitutional: Negative for chills and fever.   HENT: Negative for congestion and sore throat.    Eyes: Negative for visual disturbance.   Respiratory: Negative for shortness of breath and wheezing.    Cardiovascular: Negative for chest pain and palpitations.   Gastrointestinal: Negative for abdominal distention, abdominal pain, constipation, diarrhea, nausea and vomiting.   Endocrine: Negative for cold intolerance.   Genitourinary: Negative for dysuria and  hematuria.   Musculoskeletal: Negative for joint swelling.   Skin: Negative for rash and wound.   Allergic/Immunologic: Negative for immunocompromised state.   Neurological: Negative for seizures and headaches.   Hematological: Does not bruise/bleed easily.   Psychiatric/Behavioral: The patient is not nervous/anxious.      Objective:     Vital Signs (Most Recent):  Temp: 98.4 °F (36.9 °C) (03/20/19 1748)  Pulse: 92 (03/20/19 1815)  Resp: (!) 24 (03/20/19 1815)  BP: 135/64 (03/20/19 1815)  SpO2: 96 % (03/20/19 1815) Vital Signs (24h Range):  Temp:  [98.2 °F (36.8 °C)-98.4 °F (36.9 °C)] 98.4 °F (36.9 °C)  Pulse:  [] 92  Resp:  [19-30] 24  SpO2:  [92 %-97 %] 96 %  BP: (135-168)/(63-70) 135/64  Arterial Line BP: (142-150)/(42-44) 150/44     Weight: 77.1 kg (169 lb 15.6 oz)  Body mass index is 30.59 kg/m².    Physical Exam   Constitutional: She appears well-developed and well-nourished. No distress.   HENT:   Head: Normocephalic and atraumatic.   Eyes: Conjunctivae are normal.   Neck: Neck supple.   Cardiovascular: Normal rate.   Pulses:       Femoral pulses are 2+ on the right side, and 2+ on the left side.       Dorsalis pedis pulses are Detected w/ doppler on the right side, and Detected w/ doppler on the left side.        Posterior tibial pulses are Detected w/ doppler on the left side.   Pulmonary/Chest: Effort normal. No respiratory distress.   Abdominal: Soft. She exhibits no distension and no mass. There is no tenderness. There is no rebound and no guarding. No hernia.   Musculoskeletal: Normal range of motion. She exhibits tenderness. She exhibits no edema or deformity.   Feet:   Right Foot:   Skin Integrity: Negative for ulcer.   Left Foot:   Skin Integrity: Negative for ulcer.   Neurological: She is alert. No sensory deficit.   Skin: Skin is warm and dry. Capillary refill takes less than 2 seconds. No rash noted. She is not diaphoretic. No erythema. No pallor.   Psychiatric: She has a normal mood and  affect.   Vitals reviewed.      Significant Labs:  All pertinent labs from the last 24 hours have been reviewed.    Significant Diagnostics:  I have reviewed all pertinent imaging results/findings within the past 24 hours.    Assessment and Plan:     Obesity (BMI 30-39.9)    -Diabetic diet     Diastolic CHF, chronic    -cont to monitor volume status closely  -strict I/Os     Diabetes mellitus    -sliding scale insulin  -glucose checks     HTN (hypertension)    -cont home medications     PVD (peripheral vascular disease)    -s/p R iliofemoral bypass 3/20/19 - admit to ICU for critical care   -IVF  -Diabetic clear liquids once awake and able to sit up  -PCA per Anesthesia  -Hospitalist consult for optimization of medical comorbidities  -Strict I/O  -RLE neurovascular checks  -Postop labs         Danny Dunbar MD  Vascular Surgery  [facility] [place]

## 2019-03-20 NOTE — PROGRESS NOTES
Results for LUCERO QUICK (MRN [id number]) as of 3/20/2019 18:39   Ref. Range 3/20/2019 18:32   POC PH Latest Ref Range: 7.35 - 7.45  7.323 (L)   POC PCO2 Latest Ref Range: 35 - 45 mmHg 37.4   POC PO2 Latest Ref Range: 80 - 100 mmHg 56 (LL)   POC BE Latest Ref Range: -2 to 2 mmol/L -6   POC HCO3 Latest Ref Range: 24 - 28 mmol/L 19.4 (L)   POC SATURATED O2 Latest Ref Range: 95 - 100 % 87 (L)   POC TCO2 Latest Ref Range: 23 - 27 mmol/L 21 (L)   Flow Unknown 2   Sample Unknown ARTERIAL   DelSys Unknown Nasal Can   Allens Test Unknown N/A   Site Unknown Prieto/UAC   Mode Unknown SPONT   Sp02 Unknown 95

## 2019-03-20 NOTE — SUBJECTIVE & OBJECTIVE
"Medications Prior to Admission   Medication Sig Dispense Refill Last Dose    acetaminophen (TYLENOL) 500 MG tablet Take 500 mg by mouth once daily.   3/19/2019 at 1300    amlodipine (NORVASC) 10 MG tablet Take 10 mg by mouth once daily.   3/20/2019 at 0530    BLOOD SUGAR DIAGNOSTIC (TRUE METRIX GLUCOSE TEST STRIP MISC) by Misc.(Non-Drug; Combo Route) route.   3/20/2019 at Unknown time    glipiZIDE (GLUCOTROL) 10 MG tablet Take 10 mg by mouth 2 (two) times daily before meals.   3/19/2019 at 0800    insulin aspart protamine-insulin aspart (NOVOLOG 70/30) 100 unit/mL (70-30) InPn pen Inject 45 Units into the skin every morning.    3/19/2019 at 0630    insulin aspart protamine-insulin aspart (NOVOLOG MIX 70-30FLEXPEN U-100) 100 unit/mL (70-30) InPn pen Inject 40 Units into the skin every evening.   3/19/2019 at 1900    insulin syringe-needle,dispos. 0.3 mL 30 gauge x 5/16" Syrg by Misc.(Non-Drug; Combo Route) route.   3/19/2019 at Unknown time    losartan-hydrochlorothiazide 100-25 mg (HYZAAR) 100-25 mg per tablet Take 1 tablet by mouth once daily.   3/19/2019 at 0730    aspirin (ECOTRIN) 81 MG EC tablet Take 81 mg by mouth once daily.   3/16/2019    cilostazol (PLETAL) 50 MG Tab Take 1 tablet (50 mg total) by mouth 2 (two) times daily. If patient tolerate medication after 4 weeks, increase dose to 100 mg twice daily. 60 tablet 11 3/16/2019    diphenhydrAMINE (BENADRYL) 25 mg capsule Take 25 mg by mouth every 6 (six) hours as needed for Itching.   3/18/2019    metformin (GLUCOPHAGE) 1000 MG tablet Take 1,000 mg by mouth 2 (two) times daily with meals.   3/18/2019       Review of patient's allergies indicates:   Allergen Reactions    Motrin [ibuprofen] Itching       Past Medical History:   Diagnosis Date    CHF (congestive heart failure)     Diabetes mellitus     DVT (deep venous thrombosis)     Hypertension     Miscarriage      Past Surgical History:   Procedure Laterality Date     femoral vascular " surgery       HYSTERECTOMY      ?unsure cervix present     Family History     Problem Relation (Age of Onset)    Diabetes Father    Hypertension Father        Tobacco Use    Smoking status: Former Smoker     Last attempt to quit: 1980     Years since quittin.2    Smokeless tobacco: Never Used   Substance and Sexual Activity    Alcohol use: No    Drug use: No    Sexual activity: No     Review of Systems   Constitutional: Negative for chills and fever.   HENT: Negative for congestion and sore throat.    Eyes: Negative for visual disturbance.   Respiratory: Negative for shortness of breath and wheezing.    Cardiovascular: Negative for chest pain and palpitations.   Gastrointestinal: Negative for abdominal distention, abdominal pain, constipation, diarrhea, nausea and vomiting.   Endocrine: Negative for cold intolerance.   Genitourinary: Negative for dysuria and hematuria.   Musculoskeletal: Negative for joint swelling.   Skin: Negative for rash and wound.   Allergic/Immunologic: Negative for immunocompromised state.   Neurological: Negative for seizures and headaches.   Hematological: Does not bruise/bleed easily.   Psychiatric/Behavioral: The patient is not nervous/anxious.      Objective:     Vital Signs (Most Recent):  Temp: 98.4 °F (36.9 °C) (19 1748)  Pulse: 92 (19)  Resp: (!) 24 (19)  BP: 135/64 (19)  SpO2: 96 % (19) Vital Signs (24h Range):  Temp:  [98.2 °F (36.8 °C)-98.4 °F (36.9 °C)] 98.4 °F (36.9 °C)  Pulse:  [] 92  Resp:  [19-30] 24  SpO2:  [92 %-97 %] 96 %  BP: (135-168)/(63-70) 135/64  Arterial Line BP: (142-150)/(42-44) 150/44     Weight: 77.1 kg (169 lb 15.6 oz)  Body mass index is 30.59 kg/m².    Physical Exam   Constitutional: She appears well-developed and well-nourished. No distress.   HENT:   Head: Normocephalic and atraumatic.   Eyes: Conjunctivae are normal.   Neck: Neck supple.   Cardiovascular: Normal rate.   Pulses:        Femoral pulses are 2+ on the right side, and 2+ on the left side.       Dorsalis pedis pulses are Detected w/ doppler on the right side, and Detected w/ doppler on the left side.        Posterior tibial pulses are Detected w/ doppler on the left side.   Pulmonary/Chest: Effort normal. No respiratory distress.   Abdominal: Soft. She exhibits no distension and no mass. There is no tenderness. There is no rebound and no guarding. No hernia.   Musculoskeletal: Normal range of motion. She exhibits tenderness. She exhibits no edema or deformity.   Feet:   Right Foot:   Skin Integrity: Negative for ulcer.   Left Foot:   Skin Integrity: Negative for ulcer.   Neurological: She is alert. No sensory deficit.   Skin: Skin is warm and dry. Capillary refill takes less than 2 seconds. No rash noted. She is not diaphoretic. No erythema. No pallor.   Psychiatric: She has a normal mood and affect.   Vitals reviewed.      Significant Labs:  All pertinent labs from the last 24 hours have been reviewed.    Significant Diagnostics:  I have reviewed all pertinent imaging results/findings within the past 24 hours.

## 2019-03-20 NOTE — HPI
Danny Dunbar MD RPVI [facility]                         2019    HPI:  Nelsy Marino is a 78 y.o. female with   Patient Active Problem List   Diagnosis    Lichen sclerosus et atrophicus    PVD (peripheral vascular disease)    being managed by PCP and specialists who is being admitted postoperatively s/p R iliofemoral bypass 3/20/19.  Recovering well on BIPAP.  No complaints.  Receiving IVF and received 2u PRBC in OR.  Pain controlled.      Past Medical History:   Diagnosis Date    CHF (congestive heart failure)     Diabetes mellitus     DVT (deep venous thrombosis)     Hypertension     Miscarriage      Past Surgical History:   Procedure Laterality Date     femoral vascular surgery       HYSTERECTOMY      ?unsure cervix present     Family History   Problem Relation Age of Onset    Diabetes Father     Hypertension Father      Social History     Socioeconomic History    Marital status:      Spouse name: Not on file    Number of children: Not on file    Years of education: Not on file    Highest education level: Not on file   Social Needs    Financial resource strain: Not on file    Food insecurity - worry: Not on file    Food insecurity - inability: Not on file    Transportation needs - medical: Not on file    Transportation needs - non-medical: Not on file   Occupational History    Not on file   Tobacco Use    Smoking status: Former Smoker     Last attempt to quit: 1980     Years since quittin.2    Smokeless tobacco: Never Used   Substance and Sexual Activity    Alcohol use: No    Drug use: No    Sexual activity: No   Other Topics Concern    Not on file   Social History Narrative    Not on file       Current Facility-Administered Medications:     0.9%  NaCl infusion (for blood administration), , Intravenous, Q24H PRN, Danny Dunbar MD    hydrALAZINE injection 10 mg, 10 mg, Intravenous, Q6H PRN, Danny CAGE  MD Bettina    HYDROmorphone PCA in 0.9 % NaCl 6 Mg/30 mL (0.2 mg/mL), , Intravenous, Continuous, Kyle Ellington MD    lactated ringers infusion, , Intravenous, Continuous, Shani Duran MD    lidocaine (PF) 10 mg/ml (1%) injection 10 mg, 1 mL, Intradermal, Once, Shani Duran MD    metoprolol injection 5 mg, 5 mg, Intravenous, Q5 Min PRN, Danny Dunbar MD    naloxone 0.4 mg/mL injection 0.02 mg, 0.02 mg, Intravenous, PRN, Kyle Ellington MD    ondansetron injection 4 mg, 4 mg, Intravenous, Daily PRN, Kyle Ellington MD    ondansetron injection 4 mg, 4 mg, Intravenous, Q12H PRN, Kyle Ellington MD    sodium chloride 0.9% flush 3 mL, 3 mL, Intravenous, PRN, Kyle Ellington MD

## 2019-03-20 NOTE — TRANSFER OF CARE
"Anesthesia Transfer of Care Note    Patient: Nelsy Marino    Procedure(s) Performed: Procedure(s) (LRB):  CREATION, BYPASS, ARTERIAL, ILIAC TO FEMORAL, RIGHT LOWER EXTREMITY, RIGHT PROFUNDAPLASTY (Right)    Patient location: PACU    Anesthesia Type: general    Transport from OR: Transported from OR on room air with adequate spontaneous ventilation    Post pain: adequate analgesia    Post assessment: no apparent anesthetic complications    Post vital signs: stable    Level of consciousness: awake and alert    Nausea/Vomiting: no nausea/vomiting    Complications: none    Transfer of care protocol was followedComments: BiPap 10/5, Fi02 30%, RR28,       Last vitals:   Visit Vitals  /63   Pulse 100   Temp 36.9 °C (98.4 °F) (Axillary)   Resp (!) 28   Ht 5' 2.5" (1.588 m)   Wt 77.1 kg (169 lb 15.6 oz)   SpO2 97%   BMI 30.59 kg/m²     "

## 2019-03-20 NOTE — H&P
HPI:  Nelsy Marino is a 78 y.o. female with       Patient Active Problem List   Diagnosis    Lichen sclerosus et atrophicus    being managed by PCP and specialists who is here today for evaluation of RLE pain.  She is s/p fem-fem approx 20 yrs ago without issues until recently.  Fem-fem that was performed for RLE ALI was noted to be occluded on US at [facility].  Was scheduled for an angiogram at [facility] although was unable to proceed due to insurance purposes.  Patient states location is R calf and thigh occurring for 2 mo.  Associated signs and symptoms include none.  Quality is throbbing and severity is 9/10 at worst.  Symptoms began 2 mo ago.  Alleviating factors include rest.  Worsening factors include ambulation.  Pt states RLE claudication x 1 block.  States it is lifestyle limiting.      no MI  no Stroke  Tobacco use: quit 35 yrs ago     1/14/19: cont to c/o RLE pain, 1/2 block calf claudication.  Intermittent rest pain, no tissue loss.  Compliant with Pletal, has not noticed significant improvement.     2/2019:  Cont to experience 1/2 block R calf claudication. Now lifestyle limiting with rest pain.  No wounds.      Interval history:  No new issues since last evaluation.          Past Medical History:   Diagnosis Date    Diabetes mellitus      Hypertension      Miscarriage              Past Surgical History:   Procedure Laterality Date    HYSTERECTOMY         ?unsure cervix present            Family History   Problem Relation Age of Onset    Diabetes Father      Hypertension Father        Social History               Socioeconomic History    Marital status:        Spouse name: Not on file    Number of children: Not on file    Years of education: Not on file    Highest education level: Not on file   Social Needs    Financial resource strain: Not on file    Food insecurity - worry: Not on file    Food insecurity - inability: Not on file    Transportation needs - medical: Not on file  "   Transportation needs - non-medical: Not on file   Occupational History    Not on file   Tobacco Use    Smoking status: Former Smoker       Last attempt to quit:        Years since quittin.1    Smokeless tobacco: Never Used   Substance and Sexual Activity    Alcohol use: No    Drug use: No    Sexual activity: No   Other Topics Concern    Not on file   Social History Narrative    Not on file            Current Outpatient Medications:     amlodipine (NORVASC) 10 MG tablet, Take 10 mg by mouth once daily., Disp: , Rfl:     aspirin (ECOTRIN) 81 MG EC tablet, Take 81 mg by mouth once daily., Disp: , Rfl:     BLOOD SUGAR DIAGNOSTIC (TRUE METRIX GLUCOSE TEST STRIP MISC), by Misc.(Non-Drug; Combo Route) route., Disp: , Rfl:     cilostazol (PLETAL) 50 MG Tab, Take 1 tablet (50 mg total) by mouth 2 (two) times daily. If patient tolerate medication after 4 weeks, increase dose to 100 mg twice daily., Disp: 60 tablet, Rfl: 11    diphenhydrAMINE (BENADRYL) 25 mg capsule, Take 25 mg by mouth every 6 (six) hours as needed for Itching., Disp: , Rfl:     glipiZIDE (GLUCOTROL) 10 MG tablet, Take 10 mg by mouth 2 (two) times daily before meals., Disp: , Rfl:     hydrOXYzine pamoate (VISTARIL) 25 MG Cap, Take 1 capsule (25 mg total) by mouth every evening., Disp: 30 capsule, Rfl: 2    insulin aspart protamine-insulin aspart (NOVOLOG 70/30) 100 unit/mL (70-30) InPn pen, Inject into the skin 2 (two) times daily before meals., Disp: , Rfl:     insulin syringe-needle,dispos. 0.3 mL 30 gauge x 5/16" Syrg, by Misc.(Non-Drug; Combo Route) route., Disp: , Rfl:     losartan-hydrochlorothiazide 100-25 mg (HYZAAR) 100-25 mg per tablet, Take 1 tablet by mouth once daily., Disp: , Rfl:     metformin (GLUCOPHAGE) 1000 MG tablet, Take 1,000 mg by mouth 2 (two) times daily with meals., Disp: , Rfl:     acetaminophen (TYLENOL) 500 MG tablet, Take 500 mg by mouth once daily., Disp: , Rfl:      REVIEW OF SYSTEMS:  General: " No fevers or chills; ENT: No sore throat; Allergy and Immunology: no persistent infections; Hematological and Lymphatic: No history of bleeding or easy bruising; Endocrine: negative; Respiratory: no cough, shortness of breath, or wheezing; Cardiovascular: no chest pain or dyspnea on exertion; Gastrointestinal: no abdominal pain/back, change in bowel habits, or bloody stools; Genito-Urinary: no dysuria, trouble voiding, or hematuria; Musculoskeletal: negative; Neurological: no TIA or stroke symptoms; Psychiatric: no nervousness, anxiety or depression.     PHYSICAL EXAM:   Right Arm BP - Sittin/58 (19 1342)  Left Arm BP - Sittin/60 (19 1342)       General appearance:          Alert, well-appearing, and in no distress.  Oriented to person, place, and time                    Neurological:          Normal speech, no focal findings noted; CN II - XII grossly intact. RLE with sensation to light touch, LLE with sensation to light touch.            Musculoskeletal:          Digits/nail without cyanosis/clubbing.  Strength 5/5 BLE.                               Neck:         Supple, no significant adenopathy                             Chest:          Clear to auscultation, no wheezes, rales or rhonchi, symmetric air entry. No use of accessory muscles                          Cardiac:         Normal rate and regular rhythm, S1 and S2 normal                       Abdomen:         Soft, nontender, nondistended, no masses or organomegaly, no hernia                                      No rebound tenderness noted; bowel sounds normal                                      No groin adenopathy                 Extremities:            1+ R femoral pulse, 2+ L femoral pulse                                      doppler+ R popliteal pulse, doppler+ L popliteal pulse                                      doppler+ R PT pulse, doppler+ L PT pulse                                      doppler+ R DP pulse, doppler+ L DP  pulse                                      no RLE edema, no LLE edema                          Skin:   RLE without tissue loss; LLE without tissue loss     LAB RESULTS:  No results found for: CBC        Lab Results   Component Value Date     LABPROT 10.7 11/08/2018     INR 1.0 11/08/2018            Lab Results   Component Value Date      01/29/2019     K 5.5 (H) 01/29/2019      01/29/2019     CO2 24 01/29/2019      (H) 01/29/2019     BUN 28 (H) 01/29/2019     CREATININE 1.2 01/29/2019     CALCIUM 9.7 01/29/2019     ANIONGAP 7 (L) 01/29/2019     EGFRNONAA 43 (A) 01/29/2019            Lab Results   Component Value Date     WBC 6.75 11/08/2018     RBC 4.41 11/08/2018     HGB 10.9 (L) 11/08/2018     HCT 34.1 (L) 11/08/2018     MCV 77 (L) 11/08/2018     MCH 24.7 (L) 11/08/2018     MCHC 32.0 11/08/2018     RDW 15.7 (H) 11/08/2018      11/08/2018     MPV 10.0 11/08/2018     GRAN 4.1 11/08/2018     GRAN 60.1 11/08/2018     LYMPH 2.0 11/08/2018     LYMPH 29.8 11/08/2018     MONO 0.4 11/08/2018     MONO 6.4 11/08/2018     EOS 0.2 11/08/2018     BASO 0.01 11/08/2018     EOSINOPHIL 3.6 11/08/2018     BASOPHIL 0.1 11/08/2018     DIFFMETHOD Automated 11/08/2018      .No results found for: HGBA1C     IMAGING:  All pertinent imaging has been reviewed and interpreted independently.     11/8/18: US showing occluded fem-fem, decreased velocity R CFA, HD significant stenosis R SFA    11/2018 CTA runoff reviewed with occluded R EIA, fem fem bypass with arterial occlusive disease to proximal R profunda and R SFA     [facility] ARCHIE: 0.4/1     1/14/19: ARCHIE R 0.4/1.1, flat L toe waveform, R toe pressure 64 mmHg     2/2019: Stress test without evidence of ischemia.  Echo with normal function.     IMP/PLAN:  78 y.o. female with       Patient Active Problem List   Diagnosis    Lichen sclerosus et atrophicus    being managed by PCP and specialists who is here today for evaluation of RLE claudication, lifestyle limiting with  ischemic rest pain.    -To OR for R iliofemoral bypass  -NPO  -I have explained the risks, benefits, medical treatments, and alternatives of the procedure in detail.  The patient voices understanding and all questions have been answered.  The patient agrees to proceed as planned.

## 2019-03-20 NOTE — BRIEF OP NOTE
[facility]  Brief Operative Note    SUMMARY     Surgery Date: 3/20/2019     Surgeon(s) and Role:     * Danny Dunbar MD - Primary     * Teresa Marino MD - Assisting    Pre-op Diagnosis:  Critical lower limb ischemia [I99.8]    Post-op Diagnosis:  Post-Op Diagnosis Codes:     * Critical lower limb ischemia [I99.8]    Procedure(s) (LRB):  CREATION, BYPASS, ARTERIAL, ILIAC TO FEMORAL, RIGHT LOWER EXTREMITY, RIGHT PROFUNDAPLASTY (Right)    Anesthesia: General    Description of Procedure: adequate TIA and CFA for bypass, patent profunda    Description of the findings of the procedure: improved flow RLE after bypass, 1+ R DP at conclusion of case    Estimated Blood Loss: 1400 mL         Specimens:   Specimen (12h ago, onward)    None

## 2019-03-21 PROBLEM — E87.5 HYPERKALEMIA: Status: ACTIVE | Noted: 2019-03-21

## 2019-03-21 PROBLEM — J96.01 ACUTE RESPIRATORY FAILURE WITH HYPOXIA: Status: RESOLVED | Noted: 2019-03-20 | Resolved: 2019-03-21

## 2019-03-21 PROBLEM — I70.229 CRITICAL LOWER LIMB ISCHEMIA: Status: RESOLVED | Noted: 2019-03-20 | Resolved: 2019-03-21

## 2019-03-21 LAB
ANION GAP SERPL CALC-SCNC: 6 MMOL/L
ANION GAP SERPL CALC-SCNC: 6 MMOL/L
APTT BLDCRRT: 26.6 SEC
BASOPHILS # BLD AUTO: 0.01 K/UL
BASOPHILS # BLD AUTO: 0.01 K/UL
BASOPHILS NFR BLD: 0.1 %
BASOPHILS NFR BLD: 0.1 %
BUN SERPL-MCNC: 16 MG/DL
BUN SERPL-MCNC: 16 MG/DL
CALCIUM SERPL-MCNC: 8.2 MG/DL
CALCIUM SERPL-MCNC: 9.2 MG/DL
CHLORIDE SERPL-SCNC: 103 MMOL/L
CHLORIDE SERPL-SCNC: 106 MMOL/L
CO2 SERPL-SCNC: 22 MMOL/L
CO2 SERPL-SCNC: 26 MMOL/L
CREAT SERPL-MCNC: 1.1 MG/DL
CREAT SERPL-MCNC: 1.1 MG/DL
DIFFERENTIAL METHOD: ABNORMAL
DIFFERENTIAL METHOD: ABNORMAL
EOSINOPHIL # BLD AUTO: 0.1 K/UL
EOSINOPHIL # BLD AUTO: 0.1 K/UL
EOSINOPHIL NFR BLD: 0.6 %
EOSINOPHIL NFR BLD: 0.8 %
ERYTHROCYTE [DISTWIDTH] IN BLOOD BY AUTOMATED COUNT: 15.3 %
ERYTHROCYTE [DISTWIDTH] IN BLOOD BY AUTOMATED COUNT: 15.3 %
EST. GFR  (AFRICAN AMERICAN): 56 ML/MIN/1.73 M^2
EST. GFR  (AFRICAN AMERICAN): 56 ML/MIN/1.73 M^2
EST. GFR  (NON AFRICAN AMERICAN): 48 ML/MIN/1.73 M^2
EST. GFR  (NON AFRICAN AMERICAN): 48 ML/MIN/1.73 M^2
GLUCOSE SERPL-MCNC: 226 MG/DL
GLUCOSE SERPL-MCNC: 291 MG/DL
HCT VFR BLD AUTO: 30 %
HCT VFR BLD AUTO: 30.7 %
HGB BLD-MCNC: 10.1 G/DL
HGB BLD-MCNC: 9.7 G/DL
INR PPP: 1.1
LYMPHOCYTES # BLD AUTO: 1 K/UL
LYMPHOCYTES # BLD AUTO: 1.1 K/UL
LYMPHOCYTES NFR BLD: 8.9 %
LYMPHOCYTES NFR BLD: 9.7 %
MAGNESIUM SERPL-MCNC: 1.2 MG/DL
MAGNESIUM SERPL-MCNC: 1.2 MG/DL
MCH RBC QN AUTO: 26.4 PG
MCH RBC QN AUTO: 26.6 PG
MCHC RBC AUTO-ENTMCNC: 32.3 G/DL
MCHC RBC AUTO-ENTMCNC: 32.9 G/DL
MCV RBC AUTO: 81 FL
MCV RBC AUTO: 82 FL
MONOCYTES # BLD AUTO: 0.6 K/UL
MONOCYTES # BLD AUTO: 1 K/UL
MONOCYTES NFR BLD: 6.3 %
MONOCYTES NFR BLD: 8.2 %
NEUTROPHILS # BLD AUTO: 8.5 K/UL
NEUTROPHILS # BLD AUTO: 9.8 K/UL
NEUTROPHILS NFR BLD: 82 %
NEUTROPHILS NFR BLD: 83.3 %
PHOSPHATE SERPL-MCNC: 3.3 MG/DL
PLATELET # BLD AUTO: 150 K/UL
PLATELET # BLD AUTO: 151 K/UL
PMV BLD AUTO: 10.5 FL
PMV BLD AUTO: 9.8 FL
POCT GLUCOSE: 220 MG/DL (ref 70–110)
POCT GLUCOSE: 241 MG/DL (ref 70–110)
POCT GLUCOSE: 252 MG/DL (ref 70–110)
POCT GLUCOSE: 284 MG/DL (ref 70–110)
POTASSIUM SERPL-SCNC: 4.4 MMOL/L
POTASSIUM SERPL-SCNC: 5.7 MMOL/L
PROTHROMBIN TIME: 11.4 SEC
RBC # BLD AUTO: 3.68 M/UL
RBC # BLD AUTO: 3.79 M/UL
SODIUM SERPL-SCNC: 134 MMOL/L
SODIUM SERPL-SCNC: 135 MMOL/L
WBC # BLD AUTO: 10.22 K/UL
WBC # BLD AUTO: 11.99 K/UL

## 2019-03-21 PROCEDURE — 63600175 PHARM REV CODE 636 W HCPCS: Performed by: ANESTHESIOLOGY

## 2019-03-21 PROCEDURE — 93010 EKG 12-LEAD: ICD-10-PCS | Mod: ,,, | Performed by: INTERNAL MEDICINE

## 2019-03-21 PROCEDURE — S5571 INSULIN DISPOS PEN 3 ML: HCPCS | Performed by: INTERNAL MEDICINE

## 2019-03-21 PROCEDURE — 93010 ELECTROCARDIOGRAM REPORT: CPT | Mod: ,,, | Performed by: INTERNAL MEDICINE

## 2019-03-21 PROCEDURE — 97162 PT EVAL MOD COMPLEX 30 MIN: CPT

## 2019-03-21 PROCEDURE — 80048 BASIC METABOLIC PNL TOTAL CA: CPT | Mod: 91

## 2019-03-21 PROCEDURE — 83735 ASSAY OF MAGNESIUM: CPT | Mod: 91

## 2019-03-21 PROCEDURE — 83735 ASSAY OF MAGNESIUM: CPT

## 2019-03-21 PROCEDURE — 63600175 PHARM REV CODE 636 W HCPCS: Performed by: SURGERY

## 2019-03-21 PROCEDURE — 25000003 PHARM REV CODE 250: Performed by: HOSPITALIST

## 2019-03-21 PROCEDURE — 36415 COLL VENOUS BLD VENIPUNCTURE: CPT

## 2019-03-21 PROCEDURE — 20000000 HC ICU ROOM

## 2019-03-21 PROCEDURE — 25000003 PHARM REV CODE 250: Performed by: SURGERY

## 2019-03-21 PROCEDURE — 63600175 PHARM REV CODE 636 W HCPCS: Performed by: INTERNAL MEDICINE

## 2019-03-21 PROCEDURE — 93005 ELECTROCARDIOGRAM TRACING: CPT

## 2019-03-21 PROCEDURE — 25000003 PHARM REV CODE 250: Performed by: INTERNAL MEDICINE

## 2019-03-21 PROCEDURE — 80048 BASIC METABOLIC PNL TOTAL CA: CPT

## 2019-03-21 PROCEDURE — 84100 ASSAY OF PHOSPHORUS: CPT

## 2019-03-21 PROCEDURE — 94761 N-INVAS EAR/PLS OXIMETRY MLT: CPT

## 2019-03-21 PROCEDURE — 63600175 PHARM REV CODE 636 W HCPCS: Performed by: HOSPITALIST

## 2019-03-21 PROCEDURE — 99024 POSTOP FOLLOW-UP VISIT: CPT | Mod: ,,, | Performed by: SURGERY

## 2019-03-21 PROCEDURE — 85730 THROMBOPLASTIN TIME PARTIAL: CPT

## 2019-03-21 PROCEDURE — 85610 PROTHROMBIN TIME: CPT

## 2019-03-21 PROCEDURE — 27000221 HC OXYGEN, UP TO 24 HOURS

## 2019-03-21 PROCEDURE — 99024 PR POST-OP FOLLOW-UP VISIT: ICD-10-PCS | Mod: ,,, | Performed by: SURGERY

## 2019-03-21 PROCEDURE — 85025 COMPLETE CBC W/AUTO DIFF WBC: CPT | Mod: 91

## 2019-03-21 PROCEDURE — 97530 THERAPEUTIC ACTIVITIES: CPT

## 2019-03-21 RX ORDER — OXYCODONE AND ACETAMINOPHEN 5; 325 MG/1; MG/1
1 TABLET ORAL EVERY 4 HOURS PRN
Status: DISCONTINUED | OUTPATIENT
Start: 2019-03-21 | End: 2019-03-23

## 2019-03-21 RX ORDER — METOPROLOL TARTRATE 1 MG/ML
5 INJECTION, SOLUTION INTRAVENOUS EVERY 5 MIN PRN
Status: DISCONTINUED | OUTPATIENT
Start: 2019-03-21 | End: 2019-03-22

## 2019-03-21 RX ORDER — HYDROMORPHONE HYDROCHLORIDE 2 MG/ML
0.2 INJECTION, SOLUTION INTRAMUSCULAR; INTRAVENOUS; SUBCUTANEOUS EVERY 6 HOURS PRN
Status: DISCONTINUED | OUTPATIENT
Start: 2019-03-21 | End: 2019-03-21

## 2019-03-21 RX ORDER — CLONIDINE HYDROCHLORIDE 0.1 MG/1
0.2 TABLET ORAL 3 TIMES DAILY PRN
Status: DISCONTINUED | OUTPATIENT
Start: 2019-03-21 | End: 2019-03-24 | Stop reason: HOSPADM

## 2019-03-21 RX ORDER — HYDROMORPHONE HYDROCHLORIDE 2 MG/ML
0.2 INJECTION, SOLUTION INTRAMUSCULAR; INTRAVENOUS; SUBCUTANEOUS
Status: DISCONTINUED | OUTPATIENT
Start: 2019-03-21 | End: 2019-03-23

## 2019-03-21 RX ORDER — METOPROLOL TARTRATE 25 MG/1
25 TABLET, FILM COATED ORAL 2 TIMES DAILY
Status: DISCONTINUED | OUTPATIENT
Start: 2019-03-21 | End: 2019-03-24 | Stop reason: HOSPADM

## 2019-03-21 RX ORDER — HYDRALAZINE HYDROCHLORIDE 20 MG/ML
15 INJECTION INTRAMUSCULAR; INTRAVENOUS ONCE
Status: COMPLETED | OUTPATIENT
Start: 2019-03-21 | End: 2019-03-21

## 2019-03-21 RX ORDER — MAGNESIUM SULFATE HEPTAHYDRATE 40 MG/ML
2 INJECTION, SOLUTION INTRAVENOUS ONCE
Status: COMPLETED | OUTPATIENT
Start: 2019-03-21 | End: 2019-03-22

## 2019-03-21 RX ORDER — ASPIRIN 81 MG/1
81 TABLET ORAL DAILY
Status: DISCONTINUED | OUTPATIENT
Start: 2019-03-22 | End: 2019-03-24 | Stop reason: HOSPADM

## 2019-03-21 RX ORDER — HYDRALAZINE HYDROCHLORIDE 20 MG/ML
10 INJECTION INTRAMUSCULAR; INTRAVENOUS EVERY 8 HOURS PRN
Status: DISCONTINUED | OUTPATIENT
Start: 2019-03-21 | End: 2019-03-24 | Stop reason: HOSPADM

## 2019-03-21 RX ORDER — HEPARIN SODIUM 5000 [USP'U]/ML
5000 INJECTION, SOLUTION INTRAVENOUS; SUBCUTANEOUS EVERY 8 HOURS
Status: DISCONTINUED | OUTPATIENT
Start: 2019-03-21 | End: 2019-03-24 | Stop reason: HOSPADM

## 2019-03-21 RX ORDER — MAGNESIUM SULFATE HEPTAHYDRATE 500 MG/ML
2 INJECTION, SOLUTION INTRAMUSCULAR; INTRAVENOUS ONCE
Status: DISCONTINUED | OUTPATIENT
Start: 2019-03-21 | End: 2019-03-21

## 2019-03-21 RX ADMIN — LOSARTAN POTASSIUM AND HYDROCHLOROTHIAZIDE 2 TABLET: 12.5; 5 TABLET ORAL at 09:03

## 2019-03-21 RX ADMIN — HYDROMORPHONE HYDROCHLORIDE 0.2 MG: 2 INJECTION, SOLUTION INTRAMUSCULAR; INTRAVENOUS; SUBCUTANEOUS at 06:03

## 2019-03-21 RX ADMIN — Medication: at 07:03

## 2019-03-21 RX ADMIN — INSULIN ASPART 5 UNITS: 100 INJECTION, SOLUTION INTRAVENOUS; SUBCUTANEOUS at 07:03

## 2019-03-21 RX ADMIN — INSULIN ASPART 5 UNITS: 100 INJECTION, SOLUTION INTRAVENOUS; SUBCUTANEOUS at 11:03

## 2019-03-21 RX ADMIN — MUPIROCIN 1 G: 20 OINTMENT TOPICAL at 09:03

## 2019-03-21 RX ADMIN — HYDROMORPHONE HYDROCHLORIDE 0.2 MG: 2 INJECTION, SOLUTION INTRAMUSCULAR; INTRAVENOUS; SUBCUTANEOUS at 02:03

## 2019-03-21 RX ADMIN — OXYCODONE AND ACETAMINOPHEN 1 TABLET: 5; 325 TABLET ORAL at 05:03

## 2019-03-21 RX ADMIN — HYDROMORPHONE HYDROCHLORIDE 0.2 MG: 2 INJECTION, SOLUTION INTRAMUSCULAR; INTRAVENOUS; SUBCUTANEOUS at 10:03

## 2019-03-21 RX ADMIN — HYDRALAZINE HYDROCHLORIDE 15 MG: 20 INJECTION INTRAMUSCULAR; INTRAVENOUS at 04:03

## 2019-03-21 RX ADMIN — MAGNESIUM SULFATE HEPTAHYDRATE 2 G: 40 INJECTION, SOLUTION INTRAVENOUS at 10:03

## 2019-03-21 RX ADMIN — INSULIN ASPART 3 UNITS: 100 INJECTION, SOLUTION INTRAVENOUS; SUBCUTANEOUS at 11:03

## 2019-03-21 RX ADMIN — INSULIN DETEMIR 30 UNITS: 100 INJECTION, SOLUTION SUBCUTANEOUS at 10:03

## 2019-03-21 RX ADMIN — HYDRALAZINE HYDROCHLORIDE 10 MG: 20 INJECTION INTRAMUSCULAR; INTRAVENOUS at 11:03

## 2019-03-21 RX ADMIN — CEFAZOLIN SODIUM 2 G: 2 SOLUTION INTRAVENOUS at 09:03

## 2019-03-21 RX ADMIN — AMLODIPINE BESYLATE 10 MG: 5 TABLET ORAL at 09:03

## 2019-03-21 RX ADMIN — HEPARIN SODIUM 5000 UNITS: 5000 INJECTION, SOLUTION INTRAVENOUS; SUBCUTANEOUS at 10:03

## 2019-03-21 RX ADMIN — CEFAZOLIN SODIUM 2 G: 2 SOLUTION INTRAVENOUS at 01:03

## 2019-03-21 RX ADMIN — INSULIN ASPART 3 UNITS: 100 INJECTION, SOLUTION INTRAVENOUS; SUBCUTANEOUS at 07:03

## 2019-03-21 RX ADMIN — METOPROLOL TARTRATE 5 MG: 5 INJECTION, SOLUTION INTRAVENOUS at 01:03

## 2019-03-21 RX ADMIN — OXYCODONE AND ACETAMINOPHEN 1 TABLET: 5; 325 TABLET ORAL at 01:03

## 2019-03-21 RX ADMIN — MUPIROCIN 1 G: 20 OINTMENT TOPICAL at 10:03

## 2019-03-21 RX ADMIN — CLONIDINE HYDROCHLORIDE 0.1 MG: 0.1 TABLET ORAL at 02:03

## 2019-03-21 RX ADMIN — INSULIN ASPART 1 UNITS: 100 INJECTION, SOLUTION INTRAVENOUS; SUBCUTANEOUS at 10:03

## 2019-03-21 RX ADMIN — INSULIN ASPART 2 UNITS: 100 INJECTION, SOLUTION INTRAVENOUS; SUBCUTANEOUS at 04:03

## 2019-03-21 RX ADMIN — METOPROLOL TARTRATE 25 MG: 25 TABLET ORAL at 10:03

## 2019-03-21 RX ADMIN — METOPROLOL TARTRATE 5 MG: 5 INJECTION, SOLUTION INTRAVENOUS at 03:03

## 2019-03-21 RX ADMIN — INSULIN ASPART 5 UNITS: 100 INJECTION, SOLUTION INTRAVENOUS; SUBCUTANEOUS at 04:03

## 2019-03-21 RX ADMIN — CALCIUM GLUCONATE 1000 MG: 98 INJECTION, SOLUTION INTRAVENOUS at 08:03

## 2019-03-21 NOTE — ASSESSMENT & PLAN NOTE
-s/p R iliofemoral bypass 3/20/19 - cont ICU critical care   -HLIVF  -Diabetic diet  -D/C PCA - transition to PO pain medication with IV breakthrough  -Hospitalist consult for optimization of medical comorbidities  -D/c rivas - cont to monitor UOP closely  -RLE neurovascular checks

## 2019-03-21 NOTE — PROGRESS NOTES
Results for LUCERO QUICK (MRN [id number]) as of 3/20/2019 19:37   Ref. Range 3/20/2019 19:25   POC PH Latest Ref Range: 7.35 - 7.45  7.329 (L)   POC PCO2 Latest Ref Range: 35 - 45 mmHg 34.7 (L)   POC PO2 Latest Ref Range: 80 - 100 mmHg 85   POC BE Latest Ref Range: -2 to 2 mmol/L -7   POC HCO3 Latest Ref Range: 24 - 28 mmol/L 18.2 (L)   POC SATURATED O2 Latest Ref Range: 95 - 100 % 96   POC TCO2 Latest Ref Range: 23 - 27 mmol/L 19 (L)   FiO2 Unknown 36   Flow Unknown 4   Sample Unknown ARTERIAL   DelSys Unknown Nasal Can   Allens Test Unknown N/A   Site Unknown Prieto/UAC   Mode Unknown SPONT

## 2019-03-21 NOTE — PROGRESS NOTES
[facility] Hospital Medicine  Progress Note    Patient Name: Nelsy Marino  MRN: [id number]  Patient Class: IP- Inpatient   Admission Date: 3/20/2019  Length of Stay: 1 days  Attending Physician: Danny Dunbar MD  Primary Care Provider: [facility]        Subjective:     Principal Problem:PVD (peripheral vascular disease)    HPI:  Mrs. Nelsy Marino is a 78 y.o. female with essential hypertension, type 2 diabetes mellitus (HbA1c unknown), peripheral vascular disease, and history of DVT who presents to [facility] today for planned right iliofemoral bypass.  She reports doing well after the operation and says that she has mild pain to her right leg.  She denies any dyspnea or chest pain and is otherwise in good spirits.    Hospital Course:  79 y/o female admitted by Vascular Surgery after an ileofemoral bypass on 3/20.  Hx of DM and HTN.  Hospital Medicine consulted for medical management.    Interval History: Feeling better.    Review of Systems   HENT: Negative for ear discharge and ear pain.    Eyes: Negative for pain and itching.   Endocrine: Negative for polyphagia and polyuria.   Neurological: Negative for seizures and syncope.     Objective:     Vital Signs (Most Recent):  Temp: 99.6 °F (37.6 °C) (03/21/19 1600)  Pulse: (!) 116 (03/21/19 1745)  Resp: (!) 22 (03/21/19 1745)  BP: (!) 142/64 (03/21/19 1730)  SpO2: 96 % (03/21/19 1745) Vital Signs (24h Range):  Temp:  [97.6 °F (36.4 °C)-99.6 °F (37.6 °C)] 99.6 °F (37.6 °C)  Pulse:  [] 116  Resp:  [11-44] 22  SpO2:  [93 %-100 %] 96 %  BP: (118-190)/(53-92) 142/64  Arterial Line BP: ()/(25-65) 163/43     Weight: 81.3 kg (179 lb 3.7 oz)  Body mass index is 32.26 kg/m².    Intake/Output Summary (Last 24 hours) at 3/21/2019 1843  Last data filed at 3/21/2019 1300  Gross per 24 hour   Intake 1899.33 ml   Output 1615 ml   Net 284.33 ml      Physical Exam   Constitutional: She appears well-developed and well-nourished. No distress.    HENT:   Head: Normocephalic and atraumatic.   Right Ear: External ear normal.   Left Ear: External ear normal.   Nose: Nose normal.   Eyes: Right eye exhibits no discharge. Left eye exhibits no discharge.   Neck: Normal range of motion.   Cardiovascular: Normal rate, regular rhythm, normal heart sounds and intact distal pulses. Exam reveals no gallop and no friction rub.   No murmur heard.  Pulmonary/Chest: Effort normal and breath sounds normal. No stridor. No respiratory distress. She has no wheezes. She has no rales. She exhibits no tenderness.   Abdominal:   Soft, non-distended, non-tender to palpation, bowel sounds are hypoactive   Musculoskeletal: Normal range of motion. She exhibits no edema.   Neurological: She is alert.   Skin: Skin is warm and dry. She is not diaphoretic. No erythema.   Psychiatric: She has a normal mood and affect. Her behavior is normal. Judgment and thought content normal.   Nursing note and vitals reviewed.      Significant Labs:   BMP:   Recent Labs   Lab 03/21/19  0238   *   *   K 5.7*      CO2 22*   BUN 16   CREATININE 1.1   CALCIUM 8.2*   MG 1.2*     CBC:   Recent Labs   Lab 03/20/19  1831 03/21/19  0238   WBC 19.06* 10.22   HGB 9.6* 9.7*   HCT 29.8* 30.0*    151         Assessment/Plan:      * PVD (peripheral vascular disease)    S/p Ileofemoral bypass on 3/20.  Treatment and plan per Primary.     Hyperkalemia    Treated with IV Calcium Gluconate.  Repeat BmP in Am.       History of DVT (deep vein thrombosis)    Stable; there are no acute issues.     Obesity (BMI 30-39.9)           Diastolic CHF, chronic    No evidence of acute exacerbation.  Continue to monitor.       Type 2 diabetes mellitus    Patient is on a home regimen of metformin, a sulfonylurea, and mixed insulin therapy; will provide basal-prandial insulin therapy along with insulin sliding scale.  Uncontrolled with hyperglycemia.     Essential hypertension    Home regimen of amlodipine,  hydrochlorothiazide, and losartan, and provide as-needed clonidine.  Uncontrolled today.  Added prn Hydralazine.  Add Lopressor.       VTE Risk Mitigation (From admission, onward)        Ordered     heparin (porcine) injection 5,000 Units  Every 8 hours      03/21/19 1845     Place sequential compression device  Until discontinued      03/20/19 1808     IP VTE HIGH RISK PATIENT  Once      03/20/19 1808            Angel Chinchilla MD  Department of Hospital Medicine   [facility]

## 2019-03-21 NOTE — CONSULTS
[facility] Hospital Medicine  Consult Note    Patient Name: Nelsy Marino  MRN: [id number]  Admission Date: 3/20/2019  Hospital Length of Stay: 0 days  Attending Physician: Danny Dunbar MD   Primary Care Provider: [facility]           Patient information was obtained from patient.     Inpatient consult to Hospitalist  Consult performed by: Mariel Ellington MD  Consult ordered by: Danny Dunbar MD  Reason for consult: Medical management        Subjective:     Principal Problem: Critical lower limb ischemia    HPI: Mrs. Nelsy Marino is a 78 y.o. female with essential hypertension, type 2 diabetes mellitus (HbA1c unknown), peripheral vascular disease, and history of DVT who presents to [facility] today for planned right iliofemoral bypass.  She reports doing well after the operation and says that she has mild pain to her right leg.  She denies any dyspnea or chest pain and is otherwise in good spirits.    Chart Review:  Patient has not had any recent hospitalizations within the system.    Outpatient Follow-Up  Date of Visit Physician Service   Feb 2019 Danny Dunbar MD Vascular Surgery    Jan 2018 Rissa Cortez MD Gynecology      Past Medical History:   Diagnosis Date    CHF (congestive heart failure)     Diabetes mellitus     DVT (deep venous thrombosis)     Hypertension     Miscarriage        Past Surgical History:   Procedure Laterality Date     femoral vascular surgery       HYSTERECTOMY      ?unsure cervix present       Review of patient's allergies indicates:   Allergen Reactions    Motrin [ibuprofen] Itching       No current facility-administered medications on file prior to encounter.      Current Outpatient Medications on File Prior to Encounter   Medication Sig    acetaminophen (TYLENOL) 500 MG tablet Take 500 mg by mouth once daily.    amlodipine (NORVASC) 10 MG tablet Take 10 mg by mouth once daily.    BLOOD SUGAR DIAGNOSTIC (TRUE METRIX GLUCOSE TEST STRIP  "MISC) by Misc.(Non-Drug; Combo Route) route.    glipiZIDE (GLUCOTROL) 10 MG tablet Take 10 mg by mouth 2 (two) times daily before meals.    insulin aspart protamine-insulin aspart (NOVOLOG 70/30) 100 unit/mL (70-30) InPn pen Inject 45 Units into the skin every morning.     insulin syringe-needle,dispos. 0.3 mL 30 gauge x 5/16" Syrg by Misc.(Non-Drug; Combo Route) route.    losartan-hydrochlorothiazide 100-25 mg (HYZAAR) 100-25 mg per tablet Take 1 tablet by mouth once daily.    aspirin (ECOTRIN) 81 MG EC tablet Take 81 mg by mouth once daily.    cilostazol (PLETAL) 50 MG Tab Take 1 tablet (50 mg total) by mouth 2 (two) times daily. If patient tolerate medication after 4 weeks, increase dose to 100 mg twice daily.    diphenhydrAMINE (BENADRYL) 25 mg capsule Take 25 mg by mouth every 6 (six) hours as needed for Itching.    metformin (GLUCOPHAGE) 1000 MG tablet Take 1,000 mg by mouth 2 (two) times daily with meals.    [DISCONTINUED] hydrOXYzine pamoate (VISTARIL) 25 MG Cap Take 1 capsule (25 mg total) by mouth every evening.     Family History     Problem Relation (Age of Onset)    Diabetes Father    Hypertension Father        Tobacco Use    Smoking status: Former Smoker     Last attempt to quit: 1980     Years since quittin.2    Smokeless tobacco: Never Used   Substance and Sexual Activity    Alcohol use: No    Drug use: No    Sexual activity: No     Review of Systems   Constitutional: Negative for activity change, appetite change, chills, diaphoresis, fatigue, fever and unexpected weight change.   HENT: Negative.    Eyes: Negative.    Respiratory: Negative for cough, chest tightness, shortness of breath and wheezing.    Cardiovascular: Negative for chest pain, palpitations and leg swelling.   Gastrointestinal: Negative for abdominal distention, abdominal pain, blood in stool, constipation, diarrhea, nausea and vomiting.        Not passing flatus yet   Genitourinary: Negative for dysuria and " hematuria.   Musculoskeletal:        Right leg pain   Skin: Negative.    Neurological: Negative for dizziness, seizures, syncope, weakness and light-headedness.   Psychiatric/Behavioral: Negative.      Objective:     Vital Signs (Most Recent):  Temp: 97.9 °F (36.6 °C) (03/20/19 1920)  Pulse: 90 (03/20/19 1925)  Resp: 19 (03/20/19 1925)  BP: (!) 160/70 (03/20/19 1915)  SpO2: 99 % (03/20/19 1925) Vital Signs (24h Range):  Temp:  [97.9 °F (36.6 °C)-98.4 °F (36.9 °C)] 97.9 °F (36.6 °C)  Pulse:  [] 90  Resp:  [16-30] 19  SpO2:  [92 %-99 %] 99 %  BP: (135-168)/(60-70) 160/70  Arterial Line BP: (142-158)/(42-52) 154/48     Weight: 77.1 kg (169 lb 15.6 oz)  Body mass index is 30.59 kg/m².    Physical Exam   Constitutional: She appears well-developed and well-nourished. No distress.   HENT:   Head: Normocephalic and atraumatic.   Right Ear: External ear normal.   Left Ear: External ear normal.   Nose: Nose normal.   Eyes: Right eye exhibits no discharge. Left eye exhibits no discharge.   Neck: Normal range of motion.   Cardiovascular: Normal rate, regular rhythm, normal heart sounds and intact distal pulses. Exam reveals no gallop and no friction rub.   No murmur heard.  Pulmonary/Chest: Effort normal and breath sounds normal. No stridor. No respiratory distress. She has no wheezes. She has no rales. She exhibits no tenderness.   Abdominal:   Soft, non-distended, non-tender to palpation, bowel sounds are hypoactive   Musculoskeletal: Normal range of motion. She exhibits no edema.   Neurological: She is alert.   Skin: Skin is warm and dry. She is not diaphoretic. No erythema.   Psychiatric: She has a normal mood and affect. Her behavior is normal. Judgment and thought content normal.   Nursing note and vitals reviewed.      Significant Labs: All pertinent labs within the past 24 hours have been reviewed.    Significant Imaging: I have reviewed and interpreted all pertinent imaging results/findings within the past 24  hours.    Assessment/Plan:     * Critical right lower limb ischemia    Patient underwent iliofemoral bypass of the right side; further management as per Primary Team.     Acute respiratory failure with hypoxia    Patient was noted to be hypoxic post-operatively and an ABG was obtained at that time which was significant for 7.323  37.4  56  19.4 on a nasal cannula at 2 liters/minute.  She was started on BPAP intermittently and a repeat ABG on nasal cannula at 4 liters/minute was 7.329  34.7  85  18.2.  I suspect that this may be from hypoventilation or atelectasis.  Will obtain a portable chest film to rule out other pathologies and continue supplemental oxygen therapy.     Essential hypertension    Patient's blood pressure is well-controlled; will continue home regimen of amlodipine, hydrochlorothiazide, and losartan, and provide as-needed clonidine.     Type 2 diabetes mellitus    Patient is on a home regimen of metformin, a sulfonylurea, and mixed insulin therapy; will provide basal-prandial insulin therapy along with insulin sliding scale.     PVD (peripheral vascular disease)    As addressed above.     History of DVT (deep vein thrombosis)    Stable; there are no acute issues.       VTE Risk Mitigation (From admission, onward)        Ordered     Place sequential compression device  Until discontinued      03/20/19 1808     IP VTE HIGH RISK PATIENT  Once      03/20/19 1808          Thank you for allowing me to participate in Mrs. Marino's care.  Hospital Medicine will continue to follow.  Please do not hesitate to call/page for questions.              Mariel Ellington M.D.  Staff Nocturnist  Department of Hospital Medicine  [facility]  Pager: [phone]

## 2019-03-21 NOTE — NURSING
Pt transported to ICU with Nu, RN and OR staff. Report received. Pt connected to cardiac monitor. R groin surgical dressing CDI, abdominal RLQ surgical dressing with dime sized sanguinous drainage. Pedal pulse to R foot audible by doppler. MD aware. 1+ L pedal pulses. Bilateral lower extremities warm, and without discoloration. No numbness or tingling reported. PCA handoff completed. Will continue to monitor.

## 2019-03-21 NOTE — NURSING
Per Dr. Dunbar, EKG 12 lead ordered. MD Chinchilla notified of K level and calcium gluconate ordered. Report given to SUSIE Mccall.

## 2019-03-21 NOTE — ASSESSMENT & PLAN NOTE
Patient is on a home regimen of metformin, a sulfonylurea, and mixed insulin therapy; will provide basal-prandial insulin therapy along with insulin sliding scale.  Uncontrolled with hyperglycemia.

## 2019-03-21 NOTE — ASSESSMENT & PLAN NOTE
Home regimen of amlodipine, hydrochlorothiazide, and losartan, and provide as-needed clonidine.  Uncontrolled today.  Added prn Hydralazine.  Add Lopressor.

## 2019-03-21 NOTE — SUBJECTIVE & OBJECTIVE
"Past Medical History:   Diagnosis Date    CHF (congestive heart failure)     Diabetes mellitus     DVT (deep venous thrombosis)     Hypertension     Miscarriage        Past Surgical History:   Procedure Laterality Date     femoral vascular surgery       HYSTERECTOMY      ?unsure cervix present       Review of patient's allergies indicates:   Allergen Reactions    Motrin [ibuprofen] Itching       No current facility-administered medications on file prior to encounter.      Current Outpatient Medications on File Prior to Encounter   Medication Sig    acetaminophen (TYLENOL) 500 MG tablet Take 500 mg by mouth once daily.    amlodipine (NORVASC) 10 MG tablet Take 10 mg by mouth once daily.    BLOOD SUGAR DIAGNOSTIC (TRUE METRIX GLUCOSE TEST STRIP MISC) by Misc.(Non-Drug; Combo Route) route.    glipiZIDE (GLUCOTROL) 10 MG tablet Take 10 mg by mouth 2 (two) times daily before meals.    insulin aspart protamine-insulin aspart (NOVOLOG 70/30) 100 unit/mL (70-30) InPn pen Inject 45 Units into the skin every morning.     insulin syringe-needle,dispos. 0.3 mL 30 gauge x 5/16" Syrg by Misc.(Non-Drug; Combo Route) route.    losartan-hydrochlorothiazide 100-25 mg (HYZAAR) 100-25 mg per tablet Take 1 tablet by mouth once daily.    aspirin (ECOTRIN) 81 MG EC tablet Take 81 mg by mouth once daily.    cilostazol (PLETAL) 50 MG Tab Take 1 tablet (50 mg total) by mouth 2 (two) times daily. If patient tolerate medication after 4 weeks, increase dose to 100 mg twice daily.    diphenhydrAMINE (BENADRYL) 25 mg capsule Take 25 mg by mouth every 6 (six) hours as needed for Itching.    metformin (GLUCOPHAGE) 1000 MG tablet Take 1,000 mg by mouth 2 (two) times daily with meals.    [DISCONTINUED] hydrOXYzine pamoate (VISTARIL) 25 MG Cap Take 1 capsule (25 mg total) by mouth every evening.     Family History     Problem Relation (Age of Onset)    Diabetes Father    Hypertension Father        Tobacco Use    Smoking status: " Former Smoker     Last attempt to quit: 1980     Years since quittin.2    Smokeless tobacco: Never Used   Substance and Sexual Activity    Alcohol use: No    Drug use: No    Sexual activity: No     Review of Systems   Constitutional: Negative for activity change, appetite change, chills, diaphoresis, fatigue, fever and unexpected weight change.   HENT: Negative.    Eyes: Negative.    Respiratory: Negative for cough, chest tightness, shortness of breath and wheezing.    Cardiovascular: Negative for chest pain, palpitations and leg swelling.   Gastrointestinal: Negative for abdominal distention, abdominal pain, blood in stool, constipation, diarrhea, nausea and vomiting.        Not passing flatus yet   Genitourinary: Negative for dysuria and hematuria.   Musculoskeletal:        Right leg pain   Skin: Negative.    Neurological: Negative for dizziness, seizures, syncope, weakness and light-headedness.   Psychiatric/Behavioral: Negative.      Objective:     Vital Signs (Most Recent):  Temp: 97.9 °F (36.6 °C) (19)  Pulse: 90 (19)  Resp: 19 (19)  BP: (!) 160/70 (19)  SpO2: 99 % (19) Vital Signs (24h Range):  Temp:  [97.9 °F (36.6 °C)-98.4 °F (36.9 °C)] 97.9 °F (36.6 °C)  Pulse:  [] 90  Resp:  [16-30] 19  SpO2:  [92 %-99 %] 99 %  BP: (135-168)/(60-70) 160/70  Arterial Line BP: (142-158)/(42-52) 154/48     Weight: 77.1 kg (169 lb 15.6 oz)  Body mass index is 30.59 kg/m².    Physical Exam   Constitutional: She appears well-developed and well-nourished. No distress.   HENT:   Head: Normocephalic and atraumatic.   Right Ear: External ear normal.   Left Ear: External ear normal.   Nose: Nose normal.   Eyes: Right eye exhibits no discharge. Left eye exhibits no discharge.   Neck: Normal range of motion.   Cardiovascular: Normal rate, regular rhythm, normal heart sounds and intact distal pulses. Exam reveals no gallop and no friction rub.   No murmur  heard.  Pulmonary/Chest: Effort normal and breath sounds normal. No stridor. No respiratory distress. She has no wheezes. She has no rales. She exhibits no tenderness.   Abdominal:   Soft, non-distended, non-tender to palpation, bowel sounds are hypoactive   Musculoskeletal: Normal range of motion. She exhibits no edema.   Neurological: She is alert.   Skin: Skin is warm and dry. She is not diaphoretic. No erythema.   Psychiatric: She has a normal mood and affect. Her behavior is normal. Judgment and thought content normal.   Nursing note and vitals reviewed.      Significant Labs: All pertinent labs within the past 24 hours have been reviewed.    Significant Imaging: I have reviewed and interpreted all pertinent imaging results/findings within the past 24 hours.

## 2019-03-21 NOTE — ASSESSMENT & PLAN NOTE
Patient was noted to be hypoxic post-operatively and an ABG was obtained at that time which was significant for 7.323  37.4  56  19.4 on a nasal cannula at 2 liters/minute.  She was started on BPAP intermittently and a repeat ABG on nasal cannula at 4 liters/minute was 7.329  34.7  85  18.2.  I suspect that this may be from hypoventilation or atelectasis.  Will obtain a portable chest film to rule out other pathologies and continue supplemental oxygen therapy.

## 2019-03-21 NOTE — SUBJECTIVE & OBJECTIVE
Medications:  Continuous Infusions:  Scheduled Meds:   amLODIPine  10 mg Oral Daily    insulin aspart U-100  5 Units Subcutaneous TIDWM    insulin detemir U-100  30 Units Subcutaneous QHS    losartan-hydrochlorothiazide 50-12.5 mg  2 tablet Oral Daily    metoprolol tartrate  25 mg Oral BID    mupirocin  1 g Nasal BID     PRN Meds:acetaminophen, cloNIDine, dextrose 50%, dextrose 50%, glucagon (human recombinant), glucose, glucose, hydrALAZINE, HYDROmorphone, insulin aspart U-100, naloxone, ondansetron, ondansetron, oxyCODONE-acetaminophen     Objective:     Vital Signs (Most Recent):  Temp: 99.6 °F (37.6 °C) (03/21/19 1600)  Pulse: (!) 113 (03/21/19 1615)  Resp: 20 (03/21/19 1615)  BP: (!) 169/73 (03/21/19 1600)  SpO2: 99 % (03/21/19 1615) Vital Signs (24h Range):  Temp:  [97.6 °F (36.4 °C)-99.6 °F (37.6 °C)] 99.6 °F (37.6 °C)  Pulse:  [] 113  Resp:  [11-44] 20  SpO2:  [92 %-100 %] 99 %  BP: (118-190)/(53-92) 169/73  Arterial Line BP: ()/(25-65) 165/47     Date 03/21/19 0700 - 03/22/19 0659   Shift 8891-1055 8641-5513 4077-7795 24 Hour Total   INTAKE   I.V.(mL/kg) 315.3(3.9)   315.3(3.9)   IV Piggyback 150   150   Shift Total(mL/kg) 465.3(5.7)   465.3(5.7)   OUTPUT   Urine(mL/kg/hr) 440(0.7)   440   Shift Total(mL/kg) 440(5.4)   440(5.4)   Weight (kg) 81.3 81.3 81.3 81.3       Physical Exam   Constitutional: She appears well-developed and well-nourished. No distress.   HENT:   Head: Normocephalic and atraumatic.   Eyes: Conjunctivae are normal.   Neck: Neck supple.   Cardiovascular: Normal rate.   Pulses:       Femoral pulses are 2+ on the right side, and 2+ on the left side.       Dorsalis pedis pulses are Detected w/ doppler on the right side, and Detected w/ doppler on the left side.        Posterior tibial pulses are Detected w/ doppler on the right side, and Detected w/ doppler on the left side.   Pulmonary/Chest: Effort normal. No respiratory distress.   Abdominal: Soft. She exhibits no  distension and no mass. There is no tenderness. There is no rebound and no guarding. No hernia.   Musculoskeletal: Normal range of motion. She exhibits tenderness. She exhibits no edema or deformity.   Feet:   Right Foot:   Skin Integrity: Negative for ulcer.   Left Foot:   Skin Integrity: Negative for ulcer.   Neurological: She is alert. No sensory deficit.   Skin: Skin is warm and dry. Capillary refill takes less than 2 seconds. No rash noted. She is not diaphoretic. No erythema. No pallor.        Psychiatric: She has a normal mood and affect.   Vitals reviewed.      Significant Labs:  All pertinent labs from the last 24 hours have been reviewed.    Significant Diagnostics:  I have reviewed all pertinent imaging results/findings within the past 24 hours.

## 2019-03-21 NOTE — PLAN OF CARE
Problem: Adult Inpatient Plan of Care  Goal: Plan of Care Review  Outcome: Ongoing (interventions implemented as appropriate)  Patient AAOx4, VSS, NSR/ST on cardiac monitor. 2L NC, ETCO2 monitored, afebrile. PCA pump in use for pain management. LR infusing @ 50cc/hr R groin and abdomen dressings dry and intact with scant sanguinous drainage. R pedal pulses assessed q2 via doppler. Bilateral lower extremities warm, capillary refill less than 3 seconds, and with no tingling or numbness present. No signs/symptoms of bleeding. BG monitored, supplemental insulin given. PRN zofran given x1 for nausea, relief obtained. UO adeqaute, no BM. Pt assisted with weight shift q2 hr per protocol. SCDs in use. No falls, injury, or skin breakdown this shift. Plan of care reviewed with pt at bedside and pt  via telephone.

## 2019-03-21 NOTE — PLAN OF CARE
Problem: Physical Therapy Goal  Goal: Physical Therapy Goal  Goals to be met by: 19    Patient will increase functional independence with mobility by performin. Supine to sit with supervision  2. Sit to stand transfer with Supervision  3. Gait x500 feet with Supervision using Rolling Walker if needed  4. Lower extremity exercise program x30 reps per handout, with supervision      Patient would continue to benefit from PT while in the hospital.

## 2019-03-21 NOTE — ASSESSMENT & PLAN NOTE
Patient's blood pressure is well-controlled; will continue home regimen of amlodipine, hydrochlorothiazide, and losartan, and provide as-needed clonidine.

## 2019-03-21 NOTE — PLAN OF CARE
03/21/19 1547   Discharge Assessment   Assessment Type Discharge Planning Assessment   Confirmed/corrected address and phone number on facesheet? Yes   Assessment information obtained from? Patient   Prior to hospitilization cognitive status: Alert/Oriented   Prior to hospitalization functional status: Independent   Current cognitive status: Alert/Oriented   Current Functional Status: Independent   Facility Arrived From: home   Lives With spouse   Able to Return to Prior Arrangements yes   Is patient able to care for self after discharge? Unable to determine at this time (comments)   Who are your caregiver(s) and their phone number(s)? Dimas [phone]   Patient's perception of discharge disposition home or selfcare   Readmission Within the Last 30 Days no previous admission in last 30 days   Patient currently being followed by outpatient case management? No   Patient currently receives any other outside agency services? No   Equipment Currently Used at Home glucometer   Do you have any problems affording any of your prescribed medications? No   Is the patient taking medications as prescribed? yes   Does the patient have transportation home? Yes   Transportation Anticipated family or friend will provide   Dialysis Name and Scheduled days N/A   Does the patient receive services at the Coumadin Clinic? No   Discharge Plan A Home with family   Discharge Plan B Home with family;Home Health   DME Needed Upon Discharge  none   Patient/Family in Agreement with Plan yes       COURTNEY met with pt and pt's family at bedside. SW explained her role in Care Management. Pt voiced understanding. SW inquired about HELP AT HOME. Pt stated that she will have Dimas at home to help for support. SW voiced understanding. SW inquired about responsibilities when it comes to  MANAGING HER/HIS HEALTH at home and what it entails. Pt inquired about details. SW informed pt of RESPONSIBILITIES of:    1. Follow up appointments  2. Getting  "Prescriptions filled  3. Taking medications as prescribed.     Pt voiced understanding.  SW explained "My Health Packet" blue folder and the pink and green tabs that are on the folder as well. Discharge Brochure given to pt with Care Team information. Pt voiced understanding.     Pt's pharmacy:   [facility] - [place] - [address]  [address] [address]  Phone: [phone] Fax: [phone]    [facility] - [facility] [address]  [address] [address]  Phone: [phone] Fax: [phone]      Pt's preference for appointments:mornings       "

## 2019-03-21 NOTE — HOSPITAL COURSE
77 y/o female admitted by Vascular Surgery after an ileofemoral bypass on 3/20.  Hx of DM and HTN.  Hospital Medicine consulted for medical management.  Medically stable.  Evaluated by PT who recommends home health.  Resume home medications with no changes on discharge.

## 2019-03-21 NOTE — OP NOTE
DATE OF PROCEDURE:  03/20/2019    SURGEON:  Danny Dunbar M.D.    ASSISTANT:  Teresa Marino M.D.    PREOPERATIVE DIAGNOSES:  1.  Right lower extremity atherosclerosis with right foot ischemic rest pain and   lifestyle limiting claudication.  2.  Hypertension.  3.  Hyperlipidemia.  4.  Diabetes mellitus.  5.  Obesity.  6.  Congestive heart failure, diastolic dysfunction.    POSTOPERATIVE DIAGNOSES:  1.  Right lower extremity atherosclerosis with right foot ischemic rest pain and   lifestyle limiting claudication.  2.  Hypertension.  3.  Hyperlipidemia.  4.  Diabetes mellitus.  5.  Obesity.  6.  Congestive heart failure, diastolic dysfunction.    PROCEDURES PERFORMED:  Right common iliac artery to right common femoral artery   bypass with a 6 mm Dacron graft.      Code this as a redo bypass   Add modifier 22 for increased complexity, which include increased intensity, time,   technical difficulty of procedure, and physical and mental effort required based   upon the patient's obesity and anatomy, which led to technical difficulty of   the anastomoses and intraoperative troubleshooting associated with the patient's   redo right groin exposure and bypass.    ANESTHESIA:  General.    ESTIMATED BLOOD LOSS:  1.5 liters.    COMPLICATIONS:  None.    INDICATIONS FOR PROCEDURE:  This is a 78-year-old black female with the above   listed medical history who was seen in clinic as a referral for right lower   extremity peripheral vascular disease and an occluded right femoral-femoral   bypass graft that was placed 20 years ago.  The patient initially had complaints   of claudication, although her symptoms worsened despite compliance with medical   treatment.  Imaging was obtained.  It was determined that the patient would   benefit from a right iliofemoral bypass.  She was seen by Cardiology   preoperatively for risk stratification and was cleared for surgery.  Risks and   benefits were discussed with the patient and her  family who stated understanding   and desired to proceed with surgical intervention.    DESCRIPTION OF THE PROCEDURE:  The patient was seen preoperatively by Anesthesia   team and was deemed stable for surgery.  Her vital signs were stable and she   was without complaints.  She was brought to the Operating Room on 03/20/2019 for   the above listed procedure.  She was placed on the operating room table.    General anesthesia was induced and she was intubated without difficulty.  An   arterial line was placed in the left radial artery without complications.    Preoperative antibiotics were given by the Anesthesia team.  All appropriate IVs   were placed as well as a Paniagua catheter.  The patient was prepped and draped in   sterile fashion.  A timeout was performed.  The patient's pedal signals have   been marked with a Doppler preoperatively.  Next, the inguinal ligament was   marked anatomically.  A vertical incision was made overlying the patient's   previous right groin incision with a scalpel.  This was deepened with   electrocautery and Metzenbaum scissors, sharp dissection was used to expose the   occluded right femoral graft.  Lateral dissection was done sharply with scissors   to expose the profunda.  Next, the SFA was identified and dissected free.    Vessel loops were placed around the right profunda and SFA.  Next, the medial   aspect of the common femoral artery was exposed with Metzenbaum scissors.  Care   was taken not to injure the femoral vein.  Next, a vessel loop was placed around   the right femoral graft.  Hemostasis was achieved with electrocautery.  Next, a   moist lap was placed at the right groin and attention was turned to exposing   the iliac artery.  A transverse incision was made overlying the right rectus   muscle extending laterally with the scalpel.  Electrocautery was used to deepen   this through the level of the fascia.  The fascia was then excised vertically   with electrocautery.   Next, the rectus muscle was retracted medially and the   posterior fascia was entered vertically as well with electrocautery.  Care was   taken to not enter the peritoneum or injure the bowel.  Next, a Bookwalter   retractor was utilized to expose the right common iliac artery.  This was noted   to be suitable for inflow for our bypass.  Next, we then tunneled over the iliac   vasculature.  The patient was systemically heparinized with 7000 units of   heparin and ACT was checked to confirm appropriate dosing.  Repeat ACTs were   done every 30 minutes during the patient was anticoagulated appropriately.    After allowing 3 minutes of the heparin to circulate, and confirming a   therapeutic ACT level, we clamped the right common iliac artery with Satinsky   clamp.  Next, an 11 blade was used to make arteriotomy overlying the anterior   aspect of the common iliac artery.  Lopez scissors were used to extend this   proximally and distally.  There was noted to be calcific wall to the artery.    Next, our graft was brought into the field and parachute technique with a 4-0   Prolene was used to anastomose the graft to the common iliac artery.  This was   done in a continuous fashion.  Prior to the final knot being secured, the clamp   was released.  An arteriotomy was flushed with heparinized saline.  After our   knot was secured, our clamps were released and there was good flow noted in the   graft.  A clamp was placed on the graft.  The graft was then tunneled through a   previously created tunnel.  Appropriate tension was applied to the graft and no   twisting was noted.  We then irrigated the retroperitoneal incision and placed a   lap pad.  I placed Surgicel over the anastomosis.  There was bleeding noted   from the tail of the graft, which was repaired with a U-stitch with a 5-0   Prolene.  We then turned our attention to the right groin.  Of note, a lap pad   was placed into a retroperitoneal incision and then our  retractors were relaxed.    The previous femoral graft in the right groin was divided with heavy scissors   and passed off the field.  Thrombus was removed from the graft and right common   femoral artery.  Bleeding was noted from the profunda and SFA upon entering the   common femoral artery and our vessel loops were tightened in a Lopez fashion.    Bleeding continued that was noted to be profuse.  Manual pressure was applied to   the lateral aspect of the common femoral artery with resolution of bleeding and   a clamp was placed through the anterior aspect of the common femoral artery   where an arteriotomy was created.  We then attempted to locate the source of   bleeding, attempting to identify a posterior profunda.  There was an inadvertent   injury to the main profunda, which was repaired with a 5-0 Prolene.  The   patient had significant blood loss during this period of time as we were   searching for the source of bleeding.  We were able to locate a lateral profunda   branch through the dense scar tissue.  A clamp was then placed through this   branch.  Bleeding stopped at that time and we continued with our creation of   arteriotomy overlying the right common femoral artery and removal of the   previous femoral graft.  We extended our arteriotomy on to the right profunda.    We then cut our graft to appropriate length and beveled it appropriately.  We   then used 5-0 Prolene to complete our distal anastomosis in a continuous   fashion.  We ensured to not encroach on the origin of the profunda or SFA.    Prior to the final knot being secured, we allowed the arteries to backbleed and   test our inflow.  The inflow was noted to be robust.  There was good   backbleeding from the SFA and profunda.  We then secured our final knot and   released our distal vessel loops first.  One repair 5-0 stitch was needed   medially.  There was bleeding from the profunda origin that was controlled with   a 5-0 Prolene as well.   Surgicel was placed around the anastomosis and a Doppler   was used to confirm appropriate inflow and outflow.  Doppler was then used to   listen to the foot.  There is a triphasic DP signal, which was not present from   the preoperative assessment.  The right groin wound was then irrigated with   bacitracin saline and a moist lap was placed in the groin.  We then reexamined   our proximal anastomosis with hemostasis noted.  Surgicel was removed and the   wound was irrigated with bacitracin and saline.  We then removed our retractors.    We closed the posterior fascia with a #1 PDS as well as the anterior fascia,   which was done separately.  We then closed the deep layer with a 2-0 running   Vicryl suture and subcutaneous tissue with 3-0 Vicryl running suture.  Skin was   closed with staples.  The groin incision was also noted to be hemostatic.  This   was closed in multiple layers with 2-0 Vicryl continuous running sutures to the   deep layers and 3-0 running Vicryl suture to the subcutaneous layers.  Staples   were then applied to the skin.  Sterile dressings were applied to both of these   areas.  The patient was noted to have a 2+ right femoral pulse and a 1+ right DP   in the Operating Room.  She was awoken from general anesthesia.  Of note, we   did reverse the patient with 50 mg of protamine without hemodynamic changes.    The patient was then transferred to a stretcher and sent to the Recovery Room in   stable condition.      CCL/IN  dd: 03/20/2019 22:23:21 (CDT)  td: 03/21/2019 03:47:31 (CDT)  Doc ID   #[id number]  Job ID #[id number]    CC:

## 2019-03-21 NOTE — ANESTHESIA POSTPROCEDURE EVALUATION
"Anesthesia Post Evaluation    Patient: Nelsy Marino    Procedure(s) Performed: Procedure(s) (LRB):  CREATION, BYPASS, ARTERIAL, ILIAC TO FEMORAL, RIGHT LOWER EXTREMITY, RIGHT PROFUNDAPLASTY (Right)    Final Anesthesia Type: general  Patient location during evaluation: PACU  Patient participation: Yes- Able to Participate  Level of consciousness: awake  Post-procedure vital signs: reviewed and stable  Pain management: adequate  Airway patency: patent  PONV status at discharge: No PONV  Anesthetic complications: no      Cardiovascular status: hemodynamically stable  Respiratory status: spontaneous ventilation  Hydration status: euvolemic  Follow-up not needed.        Visit Vitals  BP (!) 149/67   Pulse 92   Temp 36.6 °C (97.9 °F) (Oral)   Resp 18   Ht 5' 2.5" (1.588 m)   Wt 77.1 kg (169 lb 15.6 oz)   SpO2 99%   BMI 30.59 kg/m²       Pain/Ana Maria Score: Pain Rating Prior to Med Admin: 4 (3/20/2019  6:38 PM)  Ana Maria Score: 8 (3/20/2019  6:20 PM)        "

## 2019-03-21 NOTE — HPI
Mrs. Nelsy Marino is a 78 y.o. female with essential hypertension, type 2 diabetes mellitus (HbA1c unknown), peripheral vascular disease, and history of DVT who presents to [facility] today for planned right iliofemoral bypass.  She reports doing well after the operation and says that she has mild pain to her right leg.  She denies any dyspnea or chest pain and is otherwise in good spirits.

## 2019-03-21 NOTE — ASSESSMENT & PLAN NOTE
Patient is on a home regimen of metformin, a sulfonylurea, and mixed insulin therapy; will provide basal-prandial insulin therapy along with insulin sliding scale.

## 2019-03-21 NOTE — PROGRESS NOTES
[facility]  Vascular Surgery  Progress Note    Patient Name: Nelsy Marino  MRN: [id number]  Admission Date: 3/20/2019  Primary Care Provider: Juan F Garay    Subjective:     Interval History: No complaints today.  HTN being treated.  States R foot feels better.    Post-Op Info:  Procedure(s) (LRB):  CREATION, BYPASS, ARTERIAL, ILIAC TO FEMORAL, RIGHT LOWER EXTREMITY, RIGHT PROFUNDAPLASTY (Right)   1 Day Post-Op       Medications:  Continuous Infusions:  Scheduled Meds:   amLODIPine  10 mg Oral Daily    insulin aspart U-100  5 Units Subcutaneous TIDWM    insulin detemir U-100  30 Units Subcutaneous QHS    losartan-hydrochlorothiazide 50-12.5 mg  2 tablet Oral Daily    metoprolol tartrate  25 mg Oral BID    mupirocin  1 g Nasal BID     PRN Meds:acetaminophen, cloNIDine, dextrose 50%, dextrose 50%, glucagon (human recombinant), glucose, glucose, hydrALAZINE, HYDROmorphone, insulin aspart U-100, naloxone, ondansetron, ondansetron, oxyCODONE-acetaminophen     Objective:     Vital Signs (Most Recent):  Temp: 99.6 °F (37.6 °C) (03/21/19 1600)  Pulse: (!) 113 (03/21/19 1615)  Resp: 20 (03/21/19 1615)  BP: (!) 169/73 (03/21/19 1600)  SpO2: 99 % (03/21/19 1615) Vital Signs (24h Range):  Temp:  [97.6 °F (36.4 °C)-99.6 °F (37.6 °C)] 99.6 °F (37.6 °C)  Pulse:  [] 113  Resp:  [11-44] 20  SpO2:  [92 %-100 %] 99 %  BP: (118-190)/(53-92) 169/73  Arterial Line BP: ()/(25-65) 165/47     Date 03/21/19 0700 - 03/22/19 0659   Shift 0521-7558 4057-4475 5147-5103 24 Hour Total   INTAKE   I.V.(mL/kg) 315.3(3.9)   315.3(3.9)   IV Piggyback 150   150   Shift Total(mL/kg) 465.3(5.7)   465.3(5.7)   OUTPUT   Urine(mL/kg/hr) 440(0.7)   440   Shift Total(mL/kg) 440(5.4)   440(5.4)   Weight (kg) 81.3 81.3 81.3 81.3       Physical Exam   Constitutional: She appears well-developed and well-nourished. No distress.   HENT:   Head: Normocephalic and atraumatic.   Eyes: Conjunctivae are normal.   Neck: Neck  supple.   Cardiovascular: Normal rate.   Pulses:       Femoral pulses are 2+ on the right side, and 2+ on the left side.       Dorsalis pedis pulses are Detected w/ doppler on the right side, and Detected w/ doppler on the left side.        Posterior tibial pulses are Detected w/ doppler on the right side, and Detected w/ doppler on the left side.   Pulmonary/Chest: Effort normal. No respiratory distress.   Abdominal: Soft. She exhibits no distension and no mass. There is no tenderness. There is no rebound and no guarding. No hernia.   Musculoskeletal: Normal range of motion. She exhibits tenderness. She exhibits no edema or deformity.   Feet:   Right Foot:   Skin Integrity: Negative for ulcer.   Left Foot:   Skin Integrity: Negative for ulcer.   Neurological: She is alert. No sensory deficit.   Skin: Skin is warm and dry. Capillary refill takes less than 2 seconds. No rash noted. She is not diaphoretic. No erythema. No pallor.        Psychiatric: She has a normal mood and affect.   Vitals reviewed.      Significant Labs:  All pertinent labs from the last 24 hours have been reviewed.    Significant Diagnostics:  I have reviewed all pertinent imaging results/findings within the past 24 hours.    Assessment/Plan:     Obesity (BMI 30-39.9)    -Diabetic diet     Diastolic CHF, chronic    -cont to monitor volume status closely     Type 2 diabetes mellitus    -sliding scale insulin  -glucose checks  -diabetic diet     Essential hypertension    -cont home medications     PVD (peripheral vascular disease)    -s/p R iliofemoral bypass 3/20/19 - cont ICU critical care   -HLIVF  -Diabetic diet  -D/C PCA - transition to PO pain medication with IV breakthrough  -Hospitalist consult for optimization of medical comorbidities  -D/c rivas - cont to monitor UOP closely  -RLE neurovascular checks         Danny Dunbar MD  Vascular Surgery  [facility]

## 2019-03-21 NOTE — PLAN OF CARE
Problem: Adult Inpatient Plan of Care  Goal: Plan of Care Review  Outcome: Ongoing (interventions implemented as appropriate)  Pt remains in ICU. AAO x 4. 3LNC. Dsgs to abdomen and R thigh with scant dried drainage; no new drainage noted. Tender to touch. PCA, IVF and rivas discontinued per MD order. Able to void 100 ml independently post removal. Pedal pulses audible with doppler. PRN meds for pain and blood pressure given with moderate relief. Arterial line remains in place. Pt was able to get OOB with PT today; tolerated well. Afebrile. VSS. Remains free of falls, injuries, and new breakdown.

## 2019-03-21 NOTE — SUBJECTIVE & OBJECTIVE
Interval History: Feeling better.    Review of Systems   HENT: Negative for ear discharge and ear pain.    Eyes: Negative for pain and itching.   Endocrine: Negative for polyphagia and polyuria.   Neurological: Negative for seizures and syncope.     Objective:     Vital Signs (Most Recent):  Temp: 99.6 °F (37.6 °C) (03/21/19 1600)  Pulse: (!) 116 (03/21/19 1745)  Resp: (!) 22 (03/21/19 1745)  BP: (!) 142/64 (03/21/19 1730)  SpO2: 96 % (03/21/19 1745) Vital Signs (24h Range):  Temp:  [97.6 °F (36.4 °C)-99.6 °F (37.6 °C)] 99.6 °F (37.6 °C)  Pulse:  [] 116  Resp:  [11-44] 22  SpO2:  [93 %-100 %] 96 %  BP: (118-190)/(53-92) 142/64  Arterial Line BP: ()/(25-65) 163/43     Weight: 81.3 kg (179 lb 3.7 oz)  Body mass index is 32.26 kg/m².    Intake/Output Summary (Last 24 hours) at 3/21/2019 1843  Last data filed at 3/21/2019 1300  Gross per 24 hour   Intake 1899.33 ml   Output 1615 ml   Net 284.33 ml      Physical Exam   Constitutional: She appears well-developed and well-nourished. No distress.   HENT:   Head: Normocephalic and atraumatic.   Right Ear: External ear normal.   Left Ear: External ear normal.   Nose: Nose normal.   Eyes: Right eye exhibits no discharge. Left eye exhibits no discharge.   Neck: Normal range of motion.   Cardiovascular: Normal rate, regular rhythm, normal heart sounds and intact distal pulses. Exam reveals no gallop and no friction rub.   No murmur heard.  Pulmonary/Chest: Effort normal and breath sounds normal. No stridor. No respiratory distress. She has no wheezes. She has no rales. She exhibits no tenderness.   Abdominal:   Soft, non-distended, non-tender to palpation, bowel sounds are hypoactive   Musculoskeletal: Normal range of motion. She exhibits no edema.   Neurological: She is alert.   Skin: Skin is warm and dry. She is not diaphoretic. No erythema.   Psychiatric: She has a normal mood and affect. Her behavior is normal. Judgment and thought content normal.   Nursing note  and vitals reviewed.      Significant Labs:   BMP:   Recent Labs   Lab 03/21/19  0238   *   *   K 5.7*      CO2 22*   BUN 16   CREATININE 1.1   CALCIUM 8.2*   MG 1.2*     CBC:   Recent Labs   Lab 03/20/19  1831 03/21/19  0238   WBC 19.06* 10.22   HGB 9.6* 9.7*   HCT 29.8* 30.0*    151

## 2019-03-22 LAB
ANION GAP SERPL CALC-SCNC: 7 MMOL/L
APTT BLDCRRT: 31.9 SEC
BASOPHILS # BLD AUTO: 0.01 K/UL
BASOPHILS NFR BLD: 0.1 %
BUN SERPL-MCNC: 16 MG/DL
CALCIUM SERPL-MCNC: 9.4 MG/DL
CHLORIDE SERPL-SCNC: 102 MMOL/L
CO2 SERPL-SCNC: 26 MMOL/L
CREAT SERPL-MCNC: 1.1 MG/DL
DIFFERENTIAL METHOD: ABNORMAL
EOSINOPHIL # BLD AUTO: 0.1 K/UL
EOSINOPHIL NFR BLD: 1.1 %
ERYTHROCYTE [DISTWIDTH] IN BLOOD BY AUTOMATED COUNT: 15.4 %
EST. GFR  (AFRICAN AMERICAN): 56 ML/MIN/1.73 M^2
EST. GFR  (NON AFRICAN AMERICAN): 48 ML/MIN/1.73 M^2
GLUCOSE SERPL-MCNC: 221 MG/DL
HCT VFR BLD AUTO: 31.3 %
HGB BLD-MCNC: 10.1 G/DL
INR PPP: 1.1
LYMPHOCYTES # BLD AUTO: 1.5 K/UL
LYMPHOCYTES NFR BLD: 11.8 %
MAGNESIUM SERPL-MCNC: 1.9 MG/DL
MCH RBC QN AUTO: 26.4 PG
MCHC RBC AUTO-ENTMCNC: 32.3 G/DL
MCV RBC AUTO: 82 FL
MONOCYTES # BLD AUTO: 1.3 K/UL
MONOCYTES NFR BLD: 9.9 %
NEUTROPHILS # BLD AUTO: 9.8 K/UL
NEUTROPHILS NFR BLD: 77.1 %
PHOSPHATE SERPL-MCNC: 2.9 MG/DL
PLATELET # BLD AUTO: 138 K/UL
PMV BLD AUTO: 9.5 FL
POCT GLUCOSE: 208 MG/DL (ref 70–110)
POCT GLUCOSE: 247 MG/DL (ref 70–110)
POCT GLUCOSE: 250 MG/DL (ref 70–110)
POCT GLUCOSE: 256 MG/DL (ref 70–110)
POTASSIUM SERPL-SCNC: 4.4 MMOL/L
PROTHROMBIN TIME: 11.4 SEC
RBC # BLD AUTO: 3.83 M/UL
SODIUM SERPL-SCNC: 135 MMOL/L
WBC # BLD AUTO: 12.76 K/UL

## 2019-03-22 PROCEDURE — 25000003 PHARM REV CODE 250: Performed by: SURGERY

## 2019-03-22 PROCEDURE — 97116 GAIT TRAINING THERAPY: CPT

## 2019-03-22 PROCEDURE — 97110 THERAPEUTIC EXERCISES: CPT

## 2019-03-22 PROCEDURE — 84100 ASSAY OF PHOSPHORUS: CPT

## 2019-03-22 PROCEDURE — 25000003 PHARM REV CODE 250: Performed by: HOSPITALIST

## 2019-03-22 PROCEDURE — 80048 BASIC METABOLIC PNL TOTAL CA: CPT

## 2019-03-22 PROCEDURE — 11000001 HC ACUTE MED/SURG PRIVATE ROOM

## 2019-03-22 PROCEDURE — 99024 PR POST-OP FOLLOW-UP VISIT: ICD-10-PCS | Mod: ,,, | Performed by: SURGERY

## 2019-03-22 PROCEDURE — 27000221 HC OXYGEN, UP TO 24 HOURS

## 2019-03-22 PROCEDURE — 83735 ASSAY OF MAGNESIUM: CPT

## 2019-03-22 PROCEDURE — 85025 COMPLETE CBC W/AUTO DIFF WBC: CPT

## 2019-03-22 PROCEDURE — 94761 N-INVAS EAR/PLS OXIMETRY MLT: CPT

## 2019-03-22 PROCEDURE — 36415 COLL VENOUS BLD VENIPUNCTURE: CPT

## 2019-03-22 PROCEDURE — 85610 PROTHROMBIN TIME: CPT

## 2019-03-22 PROCEDURE — 85730 THROMBOPLASTIN TIME PARTIAL: CPT

## 2019-03-22 PROCEDURE — 63600175 PHARM REV CODE 636 W HCPCS: Performed by: SURGERY

## 2019-03-22 PROCEDURE — 99024 POSTOP FOLLOW-UP VISIT: CPT | Mod: ,,, | Performed by: SURGERY

## 2019-03-22 RX ORDER — ONDANSETRON 2 MG/ML
8 INJECTION INTRAMUSCULAR; INTRAVENOUS EVERY 6 HOURS PRN
Status: DISCONTINUED | OUTPATIENT
Start: 2019-03-22 | End: 2019-03-24 | Stop reason: HOSPADM

## 2019-03-22 RX ORDER — INSULIN ASPART 100 [IU]/ML
8 INJECTION, SOLUTION INTRAVENOUS; SUBCUTANEOUS
Status: DISCONTINUED | OUTPATIENT
Start: 2019-03-22 | End: 2019-03-23

## 2019-03-22 RX ADMIN — MUPIROCIN 1 G: 20 OINTMENT TOPICAL at 11:03

## 2019-03-22 RX ADMIN — LOSARTAN POTASSIUM AND HYDROCHLOROTHIAZIDE 2 TABLET: 12.5; 5 TABLET ORAL at 08:03

## 2019-03-22 RX ADMIN — INSULIN ASPART 1 UNITS: 100 INJECTION, SOLUTION INTRAVENOUS; SUBCUTANEOUS at 09:03

## 2019-03-22 RX ADMIN — OXYCODONE AND ACETAMINOPHEN 1 TABLET: 5; 325 TABLET ORAL at 09:03

## 2019-03-22 RX ADMIN — INSULIN ASPART 8 UNITS: 100 INJECTION, SOLUTION INTRAVENOUS; SUBCUTANEOUS at 04:03

## 2019-03-22 RX ADMIN — METOPROLOL TARTRATE 5 MG: 5 INJECTION, SOLUTION INTRAVENOUS at 12:03

## 2019-03-22 RX ADMIN — OXYCODONE AND ACETAMINOPHEN 1 TABLET: 5; 325 TABLET ORAL at 08:03

## 2019-03-22 RX ADMIN — OXYCODONE AND ACETAMINOPHEN 1 TABLET: 5; 325 TABLET ORAL at 12:03

## 2019-03-22 RX ADMIN — HEPARIN SODIUM 5000 UNITS: 5000 INJECTION, SOLUTION INTRAVENOUS; SUBCUTANEOUS at 02:03

## 2019-03-22 RX ADMIN — INSULIN ASPART 5 UNITS: 100 INJECTION, SOLUTION INTRAVENOUS; SUBCUTANEOUS at 07:03

## 2019-03-22 RX ADMIN — METOPROLOL TARTRATE 25 MG: 25 TABLET ORAL at 08:03

## 2019-03-22 RX ADMIN — ASPIRIN 81 MG: 81 TABLET, COATED ORAL at 08:03

## 2019-03-22 RX ADMIN — MUPIROCIN 1 G: 20 OINTMENT TOPICAL at 09:03

## 2019-03-22 RX ADMIN — METOPROLOL TARTRATE 25 MG: 25 TABLET ORAL at 09:03

## 2019-03-22 RX ADMIN — OXYCODONE AND ACETAMINOPHEN 1 TABLET: 5; 325 TABLET ORAL at 01:03

## 2019-03-22 RX ADMIN — AMLODIPINE BESYLATE 10 MG: 5 TABLET ORAL at 08:03

## 2019-03-22 RX ADMIN — INSULIN ASPART 8 UNITS: 100 INJECTION, SOLUTION INTRAVENOUS; SUBCUTANEOUS at 12:03

## 2019-03-22 RX ADMIN — INSULIN ASPART 2 UNITS: 100 INJECTION, SOLUTION INTRAVENOUS; SUBCUTANEOUS at 07:03

## 2019-03-22 RX ADMIN — INSULIN ASPART 2 UNITS: 100 INJECTION, SOLUTION INTRAVENOUS; SUBCUTANEOUS at 12:03

## 2019-03-22 RX ADMIN — HEPARIN SODIUM 5000 UNITS: 5000 INJECTION, SOLUTION INTRAVENOUS; SUBCUTANEOUS at 06:03

## 2019-03-22 RX ADMIN — HEPARIN SODIUM 5000 UNITS: 5000 INJECTION, SOLUTION INTRAVENOUS; SUBCUTANEOUS at 09:03

## 2019-03-22 NOTE — PLAN OF CARE
Problem: Physical Therapy Goal  Goal: Physical Therapy Goal  Goals to be met by: 19    Patient will increase functional independence with mobility by performin. Supine to sit with supervision  2. Sit to stand transfer with Supervision  3. Gait x500 feet with Supervision using Rolling Walker if needed  4. Lower extremity exercise program x30 reps per handout, with supervision     Outcome: Ongoing (interventions implemented as appropriate)    Patient ambulated 50ft with RW and CGA. She is okay to ambulate with nursing staff assistance and RW.

## 2019-03-22 NOTE — ASSESSMENT & PLAN NOTE
Patient is on a home regimen of metformin, a sulfonylurea, and mixed insulin therapy; will provide basal-prandial insulin therapy along with insulin sliding scale.  Uncontrolled with hyperglycemia.  Will increase regimen.  Continue Novolog/Levemir while in hospital.  Resume home 70/30 regimen upon discharge.

## 2019-03-22 NOTE — SUBJECTIVE & OBJECTIVE
Interval History: No new complaints.    Review of Systems   HENT: Negative for ear discharge and ear pain.    Eyes: Negative for pain and itching.   Endocrine: Negative for polyphagia and polyuria.   Neurological: Negative for seizures and syncope.     Objective:     Vital Signs (Most Recent):  Temp: 100.3 °F (37.9 °C) (03/22/19 0715)  Pulse: 97 (03/22/19 0930)  Resp: (!) 40 (03/22/19 0930)  BP: (!) 141/113 (03/22/19 0930)  SpO2: 97 % (03/22/19 0930) Vital Signs (24h Range):  Temp:  [99 °F (37.2 °C)-100.3 °F (37.9 °C)] 100.3 °F (37.9 °C)  Pulse:  [] 97  Resp:  [12-48] 40  SpO2:  [93 %-99 %] 97 %  BP: (118-190)/() 141/113  Arterial Line BP: ()/(43-65) 109/49     Weight: 81.3 kg (179 lb 3.7 oz)  Body mass index is 32.26 kg/m².    Intake/Output Summary (Last 24 hours) at 3/22/2019 0953  Last data filed at 3/22/2019 0600  Gross per 24 hour   Intake 361.33 ml   Output 1255 ml   Net -893.67 ml      Physical Exam   Constitutional: She appears well-developed and well-nourished. No distress.   HENT:   Head: Normocephalic and atraumatic.   Right Ear: External ear normal.   Left Ear: External ear normal.   Nose: Nose normal.   Eyes: Right eye exhibits no discharge. Left eye exhibits no discharge.   Neck: Normal range of motion.   Cardiovascular: Normal rate, regular rhythm, normal heart sounds and intact distal pulses. Exam reveals no gallop and no friction rub.   No murmur heard.  Pulmonary/Chest: Effort normal and breath sounds normal. No stridor. No respiratory distress. She has no wheezes. She has no rales. She exhibits no tenderness.   Abdominal:   Soft, non-distended, non-tender to palpation, bowel sounds are hypoactive   Musculoskeletal: Normal range of motion. She exhibits no edema.   Neurological: She is alert.   Skin: Skin is warm and dry. She is not diaphoretic. No erythema.   Psychiatric: She has a normal mood and affect. Her behavior is normal. Judgment and thought content normal.   Nursing note  and vitals reviewed.      Significant Labs:   BMP:   Recent Labs   Lab 03/22/19  0613   *   *   K 4.4      CO2 26   BUN 16   CREATININE 1.1   CALCIUM 9.4   MG 1.9     CBC:   Recent Labs   Lab 03/21/19  0238 03/21/19  1844 03/22/19  0613   WBC 10.22 11.99 12.76*   HGB 9.7* 10.1* 10.1*   HCT 30.0* 30.7* 31.3*    150 138*

## 2019-03-22 NOTE — ASSESSMENT & PLAN NOTE
Home regimen of amlodipine, hydrochlorothiazide, and losartan, and provide as-needed clonidine.  Uncontrolled today.  Added prn Hydralazine.  Added Lopressor.  BP much improved.  Continue current management.

## 2019-03-22 NOTE — NURSING
After working with PT, pt ambulates independently with walker. Purewick was removed. Pt can walk with asst to the restroom.

## 2019-03-22 NOTE — PT/OT/SLP PROGRESS
Physical Therapy Treatment    Patient Name:  Nelsy Marino   MRN:  [id number]    Recommendations:     Discharge Recommendations:  home with home health   Discharge Equipment Recommendations: none   Barriers to discharge: None    Assessment:     Nelsy Marino is a 78 y.o. female admitted with a medical diagnosis of PVD (peripheral vascular disease).  She presents with the following impairments/functional limitations:  weakness, impaired endurance, impaired functional mobilty, gait instability, impaired balance, impaired cardiopulmonary response to activity, decreased lower extremity function, impaired skin. Patient increased ambulation distance today. She is okay to ambulate with RW and nursing staff assistance.     Rehab Prognosis: Good; patient would benefit from acute skilled PT services to address these deficits and reach maximum level of function.    Recent Surgery: Procedure(s) (LRB):  CREATION, BYPASS, ARTERIAL, ILIAC TO FEMORAL, RIGHT LOWER EXTREMITY, RIGHT PROFUNDAPLASTY (Right) 2 Days Post-Op    Plan:     During this hospitalization, patient to be seen daily to address the identified rehab impairments via gait training, therapeutic activities, therapeutic exercises and progress toward the following goals:    · Plan of Care Expires:  04/04/19    Subjective     Chief Complaint: Pain is better in right leg.   Patient/Family Comments/goals: To walk and get back to PLOF.   Pain/Comfort:  · Pain Rating 1: minimal pain to right leg with ambulation but did not rate with number    Objective:     Communicated with nurse Dorantes prior to session.  Patient found supine with peripheral IV, oxygen, purewick upon PT entry to room.     General Precautions: Standard, fall   Orthopedic Precautions:N/A   Braces: N/A     Functional Mobility:  · Bed Mobility:     · Supine to Sit: minimum assistance  · Transfers:     · Sit to Stand:  contact guard assistance with rolling walker x3 trials from edge of bed with verbal/tactile  "cues for technique and safety  · On first and second standing trial the patient urinated on the floor once standing stating she was "unable to control her water". PT assisted patient to wash her legs and change her socks. Nurse Jayna notified that patient was incontinent upon standing.   · Gait:  Patient ambulated 50ft with Rolling Walker and CGA using 3-point gait. Patient demonstrated decreased sabine, decreased velocity of limb motion and decreased step length during gait due to impaired balance and pain.    AM-PAC 6 CLICK MOBILITY  Turning over in bed (including adjusting bedclothes, sheets and blankets)?: 4  Sitting down on and standing up from a chair with arms (e.g., wheelchair, bedside commode, etc.): 3  Moving from lying on back to sitting on the side of the bed?: 4  Moving to and from a bed to a chair (including a wheelchair)?: 3  Need to walk in hospital room?: 3  Climbing 3-5 steps with a railing?: 3  Basic Mobility Total Score: 20     Therapeutic Activities and Exercises:   Patient performed B LE seated therex x10 reps for A/P, LAQ, and hip flex.     Patient left up in chair with all lines intact, call button in reach, nurse Dorantes notified and spouse and sister present.    GOALS:   Multidisciplinary Problems     Physical Therapy Goals        Problem: Physical Therapy Goal    Goal Priority Disciplines Outcome Goal Variances Interventions   Physical Therapy Goal     PT, PT/OT Ongoing (interventions implemented as appropriate)     Description:  Goals to be met by: 19    Patient will increase functional independence with mobility by performin. Supine to sit with supervision  2. Sit to stand transfer with Supervision  3. Gait x500 feet with Supervision using Rolling Walker if needed  4. Lower extremity exercise program x30 reps per handout, with supervision                      Time Tracking:     PT Received On: 19  PT Start Time: 1613     PT Stop Time: 1643  PT Total Time (min): 30 " min     Billable Minutes: Gait Training  15 and Therapeutic Exercise 15    Treatment Type: Treatment  PT/PTA: PT     PTA Visit Number: 0     Nona Rueda, PT  03/22/2019

## 2019-03-22 NOTE — PROGRESS NOTES
[facility] Hospital Medicine  Progress Note    Patient Name: Nelsy Marino  MRN: [id number]  Patient Class: IP- Inpatient   Admission Date: 3/20/2019  Length of Stay: 2 days  Attending Physician: Danny Dunbar MD  Primary Care Provider: [facility]        Subjective:     Principal Problem:PVD (peripheral vascular disease)    HPI:  Mrs. Nelsy Marino is a 78 y.o. female with essential hypertension, type 2 diabetes mellitus (HbA1c unknown), peripheral vascular disease, and history of DVT who presents to [facility] today for planned right iliofemoral bypass.  She reports doing well after the operation and says that she has mild pain to her right leg.  She denies any dyspnea or chest pain and is otherwise in good spirits.    Hospital Course:  79 y/o female admitted by Vascular Surgery after an ileofemoral bypass on 3/20.  Hx of DM and HTN.  Hospital Medicine consulted for medical management.    Interval History: No new complaints.    Review of Systems   HENT: Negative for ear discharge and ear pain.    Eyes: Negative for pain and itching.   Endocrine: Negative for polyphagia and polyuria.   Neurological: Negative for seizures and syncope.     Objective:     Vital Signs (Most Recent):  Temp: 100.3 °F (37.9 °C) (03/22/19 0715)  Pulse: 97 (03/22/19 0930)  Resp: (!) 40 (03/22/19 0930)  BP: (!) 141/113 (03/22/19 0930)  SpO2: 97 % (03/22/19 0930) Vital Signs (24h Range):  Temp:  [99 °F (37.2 °C)-100.3 °F (37.9 °C)] 100.3 °F (37.9 °C)  Pulse:  [] 97  Resp:  [12-48] 40  SpO2:  [93 %-99 %] 97 %  BP: (118-190)/() 141/113  Arterial Line BP: ()/(43-65) 109/49     Weight: 81.3 kg (179 lb 3.7 oz)  Body mass index is 32.26 kg/m².    Intake/Output Summary (Last 24 hours) at 3/22/2019 0953  Last data filed at 3/22/2019 0600  Gross per 24 hour   Intake 361.33 ml   Output 1255 ml   Net -893.67 ml      Physical Exam   Constitutional: She appears well-developed and well-nourished. No distress.    HENT:   Head: Normocephalic and atraumatic.   Right Ear: External ear normal.   Left Ear: External ear normal.   Nose: Nose normal.   Eyes: Right eye exhibits no discharge. Left eye exhibits no discharge.   Neck: Normal range of motion.   Cardiovascular: Normal rate, regular rhythm, normal heart sounds and intact distal pulses. Exam reveals no gallop and no friction rub.   No murmur heard.  Pulmonary/Chest: Effort normal and breath sounds normal. No stridor. No respiratory distress. She has no wheezes. She has no rales. She exhibits no tenderness.   Abdominal:   Soft, non-distended, non-tender to palpation, bowel sounds are hypoactive   Musculoskeletal: Normal range of motion. She exhibits no edema.   Neurological: She is alert.   Skin: Skin is warm and dry. She is not diaphoretic. No erythema.   Psychiatric: She has a normal mood and affect. Her behavior is normal. Judgment and thought content normal.   Nursing note and vitals reviewed.      Significant Labs:   BMP:   Recent Labs   Lab 03/22/19  0613   *   *   K 4.4      CO2 26   BUN 16   CREATININE 1.1   CALCIUM 9.4   MG 1.9     CBC:   Recent Labs   Lab 03/21/19  0238 03/21/19  1844 03/22/19  0613   WBC 10.22 11.99 12.76*   HGB 9.7* 10.1* 10.1*   HCT 30.0* 30.7* 31.3*    150 138*         Assessment/Plan:      * PVD (peripheral vascular disease)    S/p Ileofemoral bypass on 3/20.  Treatment and plan per Primary.     Hyperkalemia    Treated with IV Calcium Gluconate.  Repeat BmP in Am.       History of DVT (deep vein thrombosis)    Stable; there are no acute issues.     Obesity (BMI 30-39.9)           Diastolic CHF, chronic    No evidence of acute exacerbation.  Continue to monitor.       Type 2 diabetes mellitus    Patient is on a home regimen of metformin, a sulfonylurea, and mixed insulin therapy; will provide basal-prandial insulin therapy along with insulin sliding scale.  Uncontrolled with hyperglycemia.  Will increase  regimen.  Continue Novolog/Levemir while in hospital.  Resume home 70/30 regimen upon discharge.     Essential hypertension    Home regimen of amlodipine, hydrochlorothiazide, and losartan, and provide as-needed clonidine.  Uncontrolled today.  Added prn Hydralazine.  Added Lopressor.  BP much improved.  Continue current management.       VTE Risk Mitigation (From admission, onward)        Ordered     heparin (porcine) injection 5,000 Units  Every 8 hours      03/21/19 1845     Place sequential compression device  Until discontinued      03/20/19 1808     IP VTE HIGH RISK PATIENT  Once      03/20/19 1808          Angel Chinchilla MD  Department of Hospital Medicine   [facility]

## 2019-03-22 NOTE — NURSING
Patient transported upstairs by transport team member at 1120. Sent via bed with portable O2, chart, medications and belongings. Family at bedside, accompanied her to new room. No injury/falls.

## 2019-03-22 NOTE — PLAN OF CARE
Problem: Adult Inpatient Plan of Care  Goal: Plan of Care Review  Outcome: Ongoing (interventions implemented as appropriate)  Patient AAOx4, NSR/ST on cardiac monitor. BP elevated, PRN IV metoprolol given. 2L NC, afebrile. R groin and abdomen dressings dry and intact with scant sanguinous drainage. R pedal pulses assessed via doppler. Bilateral lower extremities warm, capillary refill less than 3 seconds, and with no tingling or numbness present. No signs/symptoms of bleeding. BG monitored, supplemental insulin given. PRN dilaudid x1 and percocet x1 given for pain. UO adeqaute, no BM. Pt assisted with weight shift q2 hr per protocol. SCDs in use. No falls, injury, or skin breakdown this shift. Plan of care reviewed with pt at bedside.

## 2019-03-23 LAB
ANION GAP SERPL CALC-SCNC: 7 MMOL/L
APTT BLDCRRT: 34.1 SEC
BASOPHILS # BLD AUTO: 0.01 K/UL
BASOPHILS NFR BLD: 0.1 %
BUN SERPL-MCNC: 28 MG/DL
CALCIUM SERPL-MCNC: 9.3 MG/DL
CHLORIDE SERPL-SCNC: 103 MMOL/L
CO2 SERPL-SCNC: 26 MMOL/L
CREAT SERPL-MCNC: 1.3 MG/DL
DIFFERENTIAL METHOD: ABNORMAL
EOSINOPHIL # BLD AUTO: 0.2 K/UL
EOSINOPHIL NFR BLD: 1.7 %
ERYTHROCYTE [DISTWIDTH] IN BLOOD BY AUTOMATED COUNT: 15.3 %
EST. GFR  (AFRICAN AMERICAN): 45 ML/MIN/1.73 M^2
EST. GFR  (NON AFRICAN AMERICAN): 39 ML/MIN/1.73 M^2
GLUCOSE SERPL-MCNC: 162 MG/DL
HCT VFR BLD AUTO: 27.4 %
HGB BLD-MCNC: 8.7 G/DL
INR PPP: 1
LYMPHOCYTES # BLD AUTO: 1.3 K/UL
LYMPHOCYTES NFR BLD: 10.5 %
MAGNESIUM SERPL-MCNC: 1.8 MG/DL
MCH RBC QN AUTO: 26 PG
MCHC RBC AUTO-ENTMCNC: 31.8 G/DL
MCV RBC AUTO: 82 FL
MONOCYTES # BLD AUTO: 0.9 K/UL
MONOCYTES NFR BLD: 7.4 %
NEUTROPHILS # BLD AUTO: 9.9 K/UL
NEUTROPHILS NFR BLD: 80.3 %
PHOSPHATE SERPL-MCNC: 3.4 MG/DL
PLATELET # BLD AUTO: 177 K/UL
PMV BLD AUTO: 11.2 FL
POCT GLUCOSE: 160 MG/DL (ref 70–110)
POCT GLUCOSE: 194 MG/DL (ref 70–110)
POCT GLUCOSE: 215 MG/DL (ref 70–110)
POCT GLUCOSE: 222 MG/DL (ref 70–110)
POTASSIUM SERPL-SCNC: 4.1 MMOL/L
PROTHROMBIN TIME: 10.7 SEC
RBC # BLD AUTO: 3.35 M/UL
SODIUM SERPL-SCNC: 136 MMOL/L
WBC # BLD AUTO: 12.28 K/UL

## 2019-03-23 PROCEDURE — 84100 ASSAY OF PHOSPHORUS: CPT

## 2019-03-23 PROCEDURE — 83735 ASSAY OF MAGNESIUM: CPT

## 2019-03-23 PROCEDURE — 80048 BASIC METABOLIC PNL TOTAL CA: CPT

## 2019-03-23 PROCEDURE — 63600175 PHARM REV CODE 636 W HCPCS: Performed by: SURGERY

## 2019-03-23 PROCEDURE — 11000001 HC ACUTE MED/SURG PRIVATE ROOM

## 2019-03-23 PROCEDURE — 97116 GAIT TRAINING THERAPY: CPT

## 2019-03-23 PROCEDURE — 99024 PR POST-OP FOLLOW-UP VISIT: ICD-10-PCS | Mod: ,,, | Performed by: SURGERY

## 2019-03-23 PROCEDURE — 85025 COMPLETE CBC W/AUTO DIFF WBC: CPT

## 2019-03-23 PROCEDURE — 85730 THROMBOPLASTIN TIME PARTIAL: CPT

## 2019-03-23 PROCEDURE — 27000221 HC OXYGEN, UP TO 24 HOURS

## 2019-03-23 PROCEDURE — 99024 POSTOP FOLLOW-UP VISIT: CPT | Mod: ,,, | Performed by: SURGERY

## 2019-03-23 PROCEDURE — 36415 COLL VENOUS BLD VENIPUNCTURE: CPT

## 2019-03-23 PROCEDURE — 94761 N-INVAS EAR/PLS OXIMETRY MLT: CPT

## 2019-03-23 PROCEDURE — 25000003 PHARM REV CODE 250: Performed by: SURGERY

## 2019-03-23 PROCEDURE — 85610 PROTHROMBIN TIME: CPT

## 2019-03-23 PROCEDURE — 97110 THERAPEUTIC EXERCISES: CPT

## 2019-03-23 PROCEDURE — 25000003 PHARM REV CODE 250: Performed by: HOSPITALIST

## 2019-03-23 RX ORDER — INSULIN ASPART 100 [IU]/ML
10 INJECTION, SOLUTION INTRAVENOUS; SUBCUTANEOUS
Status: DISCONTINUED | OUTPATIENT
Start: 2019-03-23 | End: 2019-03-24 | Stop reason: HOSPADM

## 2019-03-23 RX ORDER — DOCUSATE SODIUM 100 MG/1
100 CAPSULE, LIQUID FILLED ORAL 2 TIMES DAILY
Status: DISCONTINUED | OUTPATIENT
Start: 2019-03-23 | End: 2019-03-24 | Stop reason: HOSPADM

## 2019-03-23 RX ORDER — OXYCODONE AND ACETAMINOPHEN 5; 325 MG/1; MG/1
1 TABLET ORAL EVERY 4 HOURS PRN
Status: DISCONTINUED | OUTPATIENT
Start: 2019-03-23 | End: 2019-03-24 | Stop reason: HOSPADM

## 2019-03-23 RX ORDER — FUROSEMIDE 10 MG/ML
20 INJECTION INTRAMUSCULAR; INTRAVENOUS ONCE
Status: COMPLETED | OUTPATIENT
Start: 2019-03-23 | End: 2019-03-23

## 2019-03-23 RX ADMIN — OXYCODONE AND ACETAMINOPHEN 1 TABLET: 5; 325 TABLET ORAL at 10:03

## 2019-03-23 RX ADMIN — MUPIROCIN 1 G: 20 OINTMENT TOPICAL at 08:03

## 2019-03-23 RX ADMIN — DOCUSATE SODIUM 100 MG: 100 CAPSULE, LIQUID FILLED ORAL at 08:03

## 2019-03-23 RX ADMIN — INSULIN ASPART 2 UNITS: 100 INJECTION, SOLUTION INTRAVENOUS; SUBCUTANEOUS at 11:03

## 2019-03-23 RX ADMIN — DOCUSATE SODIUM 100 MG: 100 CAPSULE, LIQUID FILLED ORAL at 11:03

## 2019-03-23 RX ADMIN — AMLODIPINE BESYLATE 10 MG: 5 TABLET ORAL at 08:03

## 2019-03-23 RX ADMIN — OXYCODONE AND ACETAMINOPHEN 1 TABLET: 5; 325 TABLET ORAL at 08:03

## 2019-03-23 RX ADMIN — HEPARIN SODIUM 5000 UNITS: 5000 INJECTION, SOLUTION INTRAVENOUS; SUBCUTANEOUS at 02:03

## 2019-03-23 RX ADMIN — HEPARIN SODIUM 5000 UNITS: 5000 INJECTION, SOLUTION INTRAVENOUS; SUBCUTANEOUS at 10:03

## 2019-03-23 RX ADMIN — INSULIN ASPART 2 UNITS: 100 INJECTION, SOLUTION INTRAVENOUS; SUBCUTANEOUS at 04:03

## 2019-03-23 RX ADMIN — INSULIN ASPART 10 UNITS: 100 INJECTION, SOLUTION INTRAVENOUS; SUBCUTANEOUS at 04:03

## 2019-03-23 RX ADMIN — ASPIRIN 81 MG: 81 TABLET, COATED ORAL at 08:03

## 2019-03-23 RX ADMIN — OXYCODONE AND ACETAMINOPHEN 1 TABLET: 5; 325 TABLET ORAL at 06:03

## 2019-03-23 RX ADMIN — METOPROLOL TARTRATE 25 MG: 25 TABLET ORAL at 08:03

## 2019-03-23 RX ADMIN — INSULIN ASPART 8 UNITS: 100 INJECTION, SOLUTION INTRAVENOUS; SUBCUTANEOUS at 08:03

## 2019-03-23 RX ADMIN — LOSARTAN POTASSIUM AND HYDROCHLOROTHIAZIDE 2 TABLET: 12.5; 5 TABLET ORAL at 08:03

## 2019-03-23 RX ADMIN — FUROSEMIDE 20 MG: 10 INJECTION, SOLUTION INTRAMUSCULAR; INTRAVENOUS at 11:03

## 2019-03-23 RX ADMIN — HEPARIN SODIUM 5000 UNITS: 5000 INJECTION, SOLUTION INTRAVENOUS; SUBCUTANEOUS at 05:03

## 2019-03-23 RX ADMIN — INSULIN ASPART 10 UNITS: 100 INJECTION, SOLUTION INTRAVENOUS; SUBCUTANEOUS at 11:03

## 2019-03-23 NOTE — SUBJECTIVE & OBJECTIVE
Medications:  Continuous Infusions:  Scheduled Meds:   amLODIPine  10 mg Oral Daily    aspirin  81 mg Oral Daily    docusate sodium  100 mg Oral BID    furosemide  20 mg Intravenous Once    heparin (porcine)  5,000 Units Subcutaneous Q8H    insulin aspart U-100  10 Units Subcutaneous TIDWM    insulin detemir U-100  35 Units Subcutaneous QHS    losartan-hydrochlorothiazide 50-12.5 mg  2 tablet Oral Daily    metoprolol tartrate  25 mg Oral BID    mupirocin  1 g Nasal BID     PRN Meds:acetaminophen, cloNIDine, dextrose 50%, dextrose 50%, glucagon (human recombinant), glucose, glucose, hydrALAZINE, insulin aspart U-100, naloxone, ondansetron, oxyCODONE-acetaminophen     Objective:     Vital Signs (Most Recent):  Temp: 98.2 °F (36.8 °C) (03/23/19 0744)  Pulse: 87 (03/23/19 0744)  Resp: 18 (03/23/19 0744)  BP: (!) 117/56 (03/23/19 0744)  SpO2: 96 % (03/23/19 0804) Vital Signs (24h Range):  Temp:  [98.2 °F (36.8 °C)-98.8 °F (37.1 °C)] 98.2 °F (36.8 °C)  Pulse:  [] 87  Resp:  [18] 18  SpO2:  [96 %-97 %] 96 %  BP: (117-151)/(56-66) 117/56     Date 03/23/19 0700 - 03/24/19 0659   Shift 7921-6813 0255-4515 3536-5315 24 Hour Total   INTAKE   P.O. 240   240   Shift Total(mL/kg) 240(3)   240(3)   OUTPUT   Shift Total(mL/kg)       Weight (kg) 81.3 81.3 81.3 81.3       Physical Exam   Constitutional: She appears well-developed and well-nourished. No distress.   HENT:   Head: Normocephalic and atraumatic.   Eyes: Conjunctivae are normal.   Neck: Neck supple.   Cardiovascular: Normal rate.   Pulses:       Femoral pulses are 2+ on the right side, and 2+ on the left side.       Dorsalis pedis pulses are 2+ on the right side, and Detected w/ doppler on the left side.        Posterior tibial pulses are Detected w/ doppler on the right side, and Detected w/ doppler on the left side.   Pulmonary/Chest: Effort normal. No respiratory distress.   Abdominal: Soft. She exhibits no distension and no mass. There is no  tenderness. There is no rebound and no guarding. No hernia.   Musculoskeletal: Normal range of motion. She exhibits no edema, tenderness or deformity.   Feet:   Right Foot:   Skin Integrity: Negative for ulcer.   Left Foot:   Skin Integrity: Negative for ulcer.   Neurological: She is alert. No sensory deficit.   Skin: Skin is warm and dry. Capillary refill takes less than 2 seconds. No rash noted. She is not diaphoretic. No erythema. No pallor.        Psychiatric: She has a normal mood and affect.   Vitals reviewed.      Significant Labs:  All pertinent labs from the last 24 hours have been reviewed.    Significant Diagnostics:  I have reviewed all pertinent imaging results/findings within the past 24 hours.

## 2019-03-23 NOTE — ASSESSMENT & PLAN NOTE
-s/p R iliofemoral bypass 3/20/19   -Diabetic diet  -Cont PO pain medication with IV breakthrough  -F/u Hospitalist recs for optimization of medical comorbidities  -Cont PT and eval for rehab  -IV Lasix today due to desat during ambulation

## 2019-03-23 NOTE — NURSING
Pt amb to bathroom w/assist x1 remained free from fall/injury; complaint of pain 5/10 prn pain med given; safety measures maintained will cont to monitor

## 2019-03-23 NOTE — PROGRESS NOTES
[facility]  Vascular Surgery  Progress Note    Patient Name: Nelsy Marino  MRN: [id number]  Admission Date: 3/20/2019  Primary Care Provider: Juan F Garay    Subjective:     Interval History: States feels better today.  Ambulated down cordon today with PT.    Post-Op Info:  Procedure(s) (LRB):  CREATION, BYPASS, ARTERIAL, ILIAC TO FEMORAL, RIGHT LOWER EXTREMITY, RIGHT PROFUNDAPLASTY (Right)   2 Days Post-Op       Medications:  Continuous Infusions:  Scheduled Meds:   amLODIPine  10 mg Oral Daily    aspirin  81 mg Oral Daily    heparin (porcine)  5,000 Units Subcutaneous Q8H    insulin aspart U-100  8 Units Subcutaneous TIDWM    insulin detemir U-100  35 Units Subcutaneous QHS    losartan-hydrochlorothiazide 50-12.5 mg  2 tablet Oral Daily    metoprolol tartrate  25 mg Oral BID    mupirocin  1 g Nasal BID     PRN Meds:acetaminophen, cloNIDine, dextrose 50%, dextrose 50%, glucagon (human recombinant), glucose, glucose, hydrALAZINE, HYDROmorphone, insulin aspart U-100, naloxone, ondansetron, oxyCODONE-acetaminophen     Objective:     Vital Signs (Most Recent):  Temp: 98.8 °F (37.1 °C) (03/22/19 2016)  Pulse: 99 (03/22/19 2016)  Resp: 18 (03/22/19 2016)  BP: (!) 141/66 (03/22/19 2016)  SpO2: 97 % (03/22/19 2016) Vital Signs (24h Range):  Temp:  [98.8 °F (37.1 °C)-100.3 °F (37.9 °C)] 98.8 °F (37.1 °C)  Pulse:  [] 99  Resp:  [17-48] 18  SpO2:  [93 %-99 %] 97 %  BP: (118-174)/() 141/66  Arterial Line BP: ()/(46-61) 107/56     Date 03/22/19 0700 - 03/23/19 0659   Shift 6007-2177 3905-6022 8677-1094 24 Hour Total   INTAKE   P.O.  120  120   Shift Total(mL/kg)  120(1.5)  120(1.5)   OUTPUT   Urine(mL/kg/hr) 0(0)   0   Shift Total(mL/kg) 0(0)   0(0)   Weight (kg) 81.3 81.3 81.3 81.3       Physical Exam   Constitutional: She appears well-developed and well-nourished. No distress.   HENT:   Head: Normocephalic and atraumatic.   Eyes: Conjunctivae are normal.   Neck: Neck supple.    Cardiovascular: Normal rate.   Pulses:       Femoral pulses are 2+ on the right side, and 2+ on the left side.       Dorsalis pedis pulses are 2+ on the right side, and Detected w/ doppler on the left side.        Posterior tibial pulses are Detected w/ doppler on the right side, and Detected w/ doppler on the left side.   Pulmonary/Chest: Effort normal. No respiratory distress.   Abdominal: Soft. She exhibits no distension and no mass. There is no tenderness. There is no rebound and no guarding. No hernia.   Musculoskeletal: Normal range of motion. She exhibits tenderness. She exhibits no edema or deformity.   Feet:   Right Foot:   Skin Integrity: Negative for ulcer.   Left Foot:   Skin Integrity: Negative for ulcer.   Neurological: She is alert. No sensory deficit.   Skin: Skin is warm and dry. Capillary refill takes less than 2 seconds. No rash noted. She is not diaphoretic. No erythema. No pallor.        Psychiatric: She has a normal mood and affect.   Vitals reviewed.      Significant Labs:  All pertinent labs from the last 24 hours have been reviewed.    Significant Diagnostics:  I have reviewed all pertinent imaging results/findings within the past 24 hours.    Assessment/Plan:     * PVD (peripheral vascular disease)    -s/p R iliofemoral bypass 3/20/19   -Diabetic diet  -Cont PO pain medication with IV breakthrough  -F/u Hospitalist recs for optimization of medical comorbidities  -Cont PT and eval for rehab     Obesity (BMI 30-39.9)    -Diabetic diet     Diastolic CHF, chronic    -cont to monitor volume status closely  -strict I/Os     Type 2 diabetes mellitus    -sliding scale insulin  -glucose checks  -diabetic diet     Essential hypertension    -cont home medications         Danny Dunbar MD  Vascular Surgery  [facility]

## 2019-03-23 NOTE — SUBJECTIVE & OBJECTIVE
Interval History:  Pain controlled with medications.  Has not had a bowel movement.    Review of Systems   Constitutional: Negative for appetite change.   Respiratory: Negative for cough and shortness of breath.    Cardiovascular: Negative for chest pain and leg swelling.   Gastrointestinal: Positive for abdominal pain and constipation. Negative for nausea and vomiting.   Genitourinary: Negative for difficulty urinating and dysuria.     Objective:     Vital Signs (Most Recent):  Temp: 98.2 °F (36.8 °C) (03/23/19 0744)  Pulse: 87 (03/23/19 0744)  Resp: 18 (03/23/19 0744)  BP: (!) 117/56 (03/23/19 0744)  SpO2: 96 % (03/23/19 0804) Vital Signs (24h Range):  Temp:  [98.2 °F (36.8 °C)-98.8 °F (37.1 °C)] 98.2 °F (36.8 °C)  Pulse:  [] 87  Resp:  [18] 18  SpO2:  [96 %-97 %] 96 %  BP: (117-151)/(56-73) 117/56  Arterial Line BP: (107)/(56) 107/56     Weight: 81.3 kg (179 lb 3.7 oz)  Body mass index is 32.26 kg/m².    Intake/Output Summary (Last 24 hours) at 3/23/2019 1027  Last data filed at 3/23/2019 0500  Gross per 24 hour   Intake 480 ml   Output 0 ml   Net 480 ml      Physical Exam   Constitutional: She appears well-developed and well-nourished. No distress.   HENT:   Head: Normocephalic and atraumatic.   Nose: Nose normal.   Mouth/Throat: Oropharynx is clear and moist.   Cardiovascular: Normal rate, regular rhythm, normal heart sounds and intact distal pulses. Exam reveals no gallop and no friction rub.   No murmur heard.  Pulmonary/Chest: Effort normal and breath sounds normal. No stridor. No respiratory distress. She has no wheezes. She has no rales.   Abdominal: Bowel sounds are normal. She exhibits distension. She exhibits no mass. There is tenderness (At surgical site). There is no guarding.   Bandage clean dry intact   Musculoskeletal: She exhibits no edema.   Neurological: She is alert.   Skin: Skin is warm and dry. She is not diaphoretic.   Nursing note and vitals reviewed.      Significant Labs: All  pertinent labs within the past 24 hours have been reviewed.    Significant Imaging: I have reviewed and interpreted all pertinent imaging results/findings within the past 24 hours.

## 2019-03-23 NOTE — PLAN OF CARE
Pt resting in bed no distress noted no complaint of pain; scheduled meds given w/out difficulty; IV saline lock; dressing clean dry intact; accu checks monitored w/coverage; 2L NC no complaint of SOB; pt remained free from fall/injury; family remained at bedside during shift; safety measures maintained will cont to monitored

## 2019-03-23 NOTE — PROGRESS NOTES
[facility] Hospital Medicine  Progress Note    Patient Name: Nelsy Marino  MRN: [id number]  Patient Class: IP- Inpatient   Admission Date: 3/20/2019  Length of Stay: 3 days  Attending Physician: Danny Dunbar MD  Primary Care Provider: [facility]        Subjective:     Principal Problem:PVD (peripheral vascular disease)    HPI:  Mrs. Nelsy Marino is a 78 y.o. female with essential hypertension, type 2 diabetes mellitus (HbA1c unknown), peripheral vascular disease, and history of DVT who presents to [facility] today for planned right iliofemoral bypass.  She reports doing well after the operation and says that she has mild pain to her right leg.  She denies any dyspnea or chest pain and is otherwise in good spirits.    Hospital Course:  77 y/o female admitted by Vascular Surgery after an ileofemoral bypass on 3/20.  Hx of DM and HTN.  Hospital Medicine consulted for medical management.  Medically stable.  Evaluated by PT who recommends home health.  Resume home medications with no changes on discharge    Interval History:  Pain controlled with medications.  Has not had a bowel movement.    Review of Systems   Constitutional: Negative for appetite change.   Respiratory: Negative for cough and shortness of breath.    Cardiovascular: Negative for chest pain and leg swelling.   Gastrointestinal: Positive for abdominal pain and constipation. Negative for nausea and vomiting.   Genitourinary: Negative for difficulty urinating and dysuria.     Objective:     Vital Signs (Most Recent):  Temp: 98.2 °F (36.8 °C) (03/23/19 0744)  Pulse: 87 (03/23/19 0744)  Resp: 18 (03/23/19 0744)  BP: (!) 117/56 (03/23/19 0744)  SpO2: 96 % (03/23/19 0804) Vital Signs (24h Range):  Temp:  [98.2 °F (36.8 °C)-98.8 °F (37.1 °C)] 98.2 °F (36.8 °C)  Pulse:  [] 87  Resp:  [18] 18  SpO2:  [96 %-97 %] 96 %  BP: (117-151)/(56-73) 117/56  Arterial Line BP: (107)/(56) 107/56     Weight: 81.3 kg (179 lb 3.7 oz)  Body  mass index is 32.26 kg/m².    Intake/Output Summary (Last 24 hours) at 3/23/2019 1027  Last data filed at 3/23/2019 0500  Gross per 24 hour   Intake 480 ml   Output 0 ml   Net 480 ml      Physical Exam   Constitutional: She appears well-developed and well-nourished. No distress.   HENT:   Head: Normocephalic and atraumatic.   Nose: Nose normal.   Mouth/Throat: Oropharynx is clear and moist.   Cardiovascular: Normal rate, regular rhythm, normal heart sounds and intact distal pulses. Exam reveals no gallop and no friction rub.   No murmur heard.  Pulmonary/Chest: Effort normal and breath sounds normal. No stridor. No respiratory distress. She has no wheezes. She has no rales.   Abdominal: Bowel sounds are normal. She exhibits distension. She exhibits no mass. There is tenderness (At surgical site). There is no guarding.   Bandage clean dry intact   Musculoskeletal: She exhibits no edema.   Neurological: She is alert.   Skin: Skin is warm and dry. She is not diaphoretic.   Nursing note and vitals reviewed.      Significant Labs: All pertinent labs within the past 24 hours have been reviewed.    Significant Imaging: I have reviewed and interpreted all pertinent imaging results/findings within the past 24 hours.    Assessment/Plan:      * PVD (peripheral vascular disease)    S/p Ileofemoral bypass on 3/20.  Treatment and plan per Primary.     Hyperkalemia    Treated with IV Calcium Gluconate.  Resolved        History of DVT (deep vein thrombosis)    Stable; there are no acute issues.     Obesity (BMI 30-39.9)    Body mass index is 32.26 kg/m².  Encourage weight loss       Diastolic CHF, chronic    No evidence of acute exacerbation.  Continue to monitor.       Type 2 diabetes mellitus    Patient is on a home regimen of metformin, a sulfonylurea, and mixed insulin therapy; will provide basal-prandial insulin therapy along with insulin sliding scale.  Uncontrolled with hyperglycemia. A1c 9.7% (3/2019)  Will increase  regimen.  Continue Novolog/Levemir while in hospital.  Resume home 70/30 regimen and PO meds upon discharge.     Essential hypertension    Home regimen of amlodipine, hydrochlorothiazide, and losartan, and provide as-needed clonidine.  Uncontrolled today.  Added prn Hydralazine.  Added Lopressor.  BP much improved.  Continue current management.    Resume home medications at discharge       VTE Risk Mitigation (From admission, onward)        Ordered     heparin (porcine) injection 5,000 Units  Every 8 hours      03/21/19 1845     Place sequential compression device  Until discontinued      03/20/19 1808     IP VTE HIGH RISK PATIENT  Once      03/20/19 1808          Patient is medically stable for discharge to home with home health PT. Follow up with PCP for DM control and Vascular for post op check.     Jenniffer Cerrato MD  Department of Hospital Medicine   [facility]

## 2019-03-23 NOTE — PLAN OF CARE
Problem: Adult Inpatient Plan of Care  Goal: Plan of Care Review  Outcome: Ongoing (interventions implemented as appropriate)  Pt's free of accidental injuries during shift. No s/s of distress noted. Denies pain at present. Able to make needs known. Set up in chair entire shift with family at bedside. Dressing changed to left groin and abdomen as ordered. Safety measures maintained. Call bell within reach. Frequent rounds made. Will continue to monitor

## 2019-03-23 NOTE — PROGRESS NOTES
[facility]  Vascular Surgery  Progress Note    Patient Name: Nelsy Marino  MRN: [id number]  Admission Date: 3/20/2019  Primary Care Provider: Juan F Garay    Subjective:     Interval History: No complaints today, sitting in chair, +ambulation.    Post-Op Info:  Procedure(s) (LRB):  CREATION, BYPASS, ARTERIAL, ILIAC TO FEMORAL, RIGHT LOWER EXTREMITY, RIGHT PROFUNDAPLASTY (Right)   3 Days Post-Op       Medications:  Continuous Infusions:  Scheduled Meds:   amLODIPine  10 mg Oral Daily    aspirin  81 mg Oral Daily    docusate sodium  100 mg Oral BID    furosemide  20 mg Intravenous Once    heparin (porcine)  5,000 Units Subcutaneous Q8H    insulin aspart U-100  10 Units Subcutaneous TIDWM    insulin detemir U-100  35 Units Subcutaneous QHS    losartan-hydrochlorothiazide 50-12.5 mg  2 tablet Oral Daily    metoprolol tartrate  25 mg Oral BID    mupirocin  1 g Nasal BID     PRN Meds:acetaminophen, cloNIDine, dextrose 50%, dextrose 50%, glucagon (human recombinant), glucose, glucose, hydrALAZINE, insulin aspart U-100, naloxone, ondansetron, oxyCODONE-acetaminophen     Objective:     Vital Signs (Most Recent):  Temp: 98.2 °F (36.8 °C) (03/23/19 0744)  Pulse: 87 (03/23/19 0744)  Resp: 18 (03/23/19 0744)  BP: (!) 117/56 (03/23/19 0744)  SpO2: 96 % (03/23/19 0804) Vital Signs (24h Range):  Temp:  [98.2 °F (36.8 °C)-98.8 °F (37.1 °C)] 98.2 °F (36.8 °C)  Pulse:  [] 87  Resp:  [18] 18  SpO2:  [96 %-97 %] 96 %  BP: (117-151)/(56-66) 117/56     Date 03/23/19 0700 - 03/24/19 0659   Shift 5983-4303 6106-6020 2287-1592 24 Hour Total   INTAKE   P.O. 240   240   Shift Total(mL/kg) 240(3)   240(3)   OUTPUT   Shift Total(mL/kg)       Weight (kg) 81.3 81.3 81.3 81.3       Physical Exam   Constitutional: She appears well-developed and well-nourished. No distress.   HENT:   Head: Normocephalic and atraumatic.   Eyes: Conjunctivae are normal.   Neck: Neck supple.   Cardiovascular: Normal rate.    Pulses:       Femoral pulses are 2+ on the right side, and 2+ on the left side.       Dorsalis pedis pulses are 2+ on the right side, and Detected w/ doppler on the left side.        Posterior tibial pulses are Detected w/ doppler on the right side, and Detected w/ doppler on the left side.   Pulmonary/Chest: Effort normal. No respiratory distress.   Abdominal: Soft. She exhibits no distension and no mass. There is no tenderness. There is no rebound and no guarding. No hernia.   Musculoskeletal: Normal range of motion. She exhibits no edema, tenderness or deformity.   Feet:   Right Foot:   Skin Integrity: Negative for ulcer.   Left Foot:   Skin Integrity: Negative for ulcer.   Neurological: She is alert. No sensory deficit.   Skin: Skin is warm and dry. Capillary refill takes less than 2 seconds. No rash noted. She is not diaphoretic. No erythema. No pallor.        Psychiatric: She has a normal mood and affect.   Vitals reviewed.      Significant Labs:  All pertinent labs from the last 24 hours have been reviewed.    Significant Diagnostics:  I have reviewed all pertinent imaging results/findings within the past 24 hours.    Assessment/Plan:     * PVD (peripheral vascular disease)    -s/p R iliofemoral bypass 3/20/19   -Diabetic diet  -Cont PO pain medication with IV breakthrough  -F/u Hospitalist recs for optimization of medical comorbidities  -Cont PT and eval for rehab  -IV Lasix today due to desat during ambulation     Obesity (BMI 30-39.9)    -Diabetic diet     Diastolic CHF, chronic    -cont to monitor volume status closely  -strict I/Os     Type 2 diabetes mellitus    -sliding scale insulin  -glucose checks  -diabetic diet     Essential hypertension    -cont home medications         Danny Dunbar MD  Vascular Surgery  [facility]

## 2019-03-23 NOTE — ASSESSMENT & PLAN NOTE
Patient is on a home regimen of metformin, a sulfonylurea, and mixed insulin therapy; will provide basal-prandial insulin therapy along with insulin sliding scale.  Uncontrolled with hyperglycemia. A1c 9.7% (3/2019)  Will increase regimen.  Continue Novolog/Levemir while in hospital.  Resume home 70/30 regimen and PO meds upon discharge.

## 2019-03-23 NOTE — PT/OT/SLP PROGRESS
"Physical Therapy Treatment    Patient Name:  Nelsy Marino   MRN:  [id number]    Recommendations:     Discharge Recommendations:  home with home health   Discharge Equipment Recommendations: none   Barriers to discharge: None    Assessment:     Nelsy Marino is a 78 y.o. female admitted with a medical diagnosis of PVD (peripheral vascular disease).  She presents with the following impairments/functional limitations:  weakness, impaired endurance, impaired functional mobilty, impaired self care skills, gait instability, impaired cardiopulmonary response to activity, decreased lower extremity function, impaired skin, impaired coordination, impaired balance.  Pt is making gains with functional mobility and gait distance.  Pt ambulated ~200ft with RW SBA.  Pt will have assistance from spouse and sister upon returning home.    Rehab Prognosis: Good; patient would benefit from acute skilled PT services to address these deficits and reach maximum level of function.    Recent Surgery: Procedure(s) (LRB):  CREATION, BYPASS, ARTERIAL, ILIAC TO FEMORAL, RIGHT LOWER EXTREMITY, RIGHT PROFUNDAPLASTY (Right) 3 Days Post-Op    Plan:     During this hospitalization, patient to be seen daily to address the identified rehab impairments via gait training, therapeutic activities, therapeutic exercises and progress toward the following goals:    · Plan of Care Expires:  03/04/19    Subjective     Chief Complaint: needing to use the bathroom  Patient/Family Comments/goals: Pt states " I feel so much better today."  Pain/Comfort:  · Pain Rating 1: 3/10  · Location - Side 1: Right  · Location 1: leg   · Premdicated, Nurse notified      Objective:     Communicated with pt's nurse, Haydee, prior to session.  Patient found HOB elevated with peripheral IV, oxygen upon PT entry to room.     General Precautions: Standard, fall   Orthopedic Precautions:N/A   Braces: N/A     Functional Mobility:  · Bed Mobility:     · Scooting: contact guard " assistance  · Supine to Sit: contact guard assistance  · Transfers: from EOB, from toilet    · Sit to Stand:  stand by assistance and contact guard assistance with rolling walker  · Gait: Pt ambulated ~200ft with RW, SBA on 2LO2 NC.  Pt with decreased sabine, step length, velocity of limb, and weight shifting ability.  · Balance: good sitting and fair+ standing      AM-PAC 6 CLICK MOBILITY  Turning over in bed (including adjusting bedclothes, sheets and blankets)?: 4  Sitting down on and standing up from a chair with arms (e.g., wheelchair, bedside commode, etc.): 3  Moving from lying on back to sitting on the side of the bed?: 4  Moving to and from a bed to a chair (including a wheelchair)?: 3  Need to walk in hospital room?: 3  Climbing 3-5 steps with a railing?: 3  Basic Mobility Total Score: 20       Therapeutic Activities and Exercises:   Pt performed seated therex to BLEs x15 reps AROM including: hip flexion, LAQs, heel raises, toe raises, active hip abduction/adduction  - Pt tolerated standing at sink with no UE support, SBA to wash hands.  - Pt received packet for HEP with education provided.  Pt verbalized understanding.  Patient left up in chair with all lines intact, call button in reach, Dr. Dunbar and pt's nurse, Haydee, notified and sister and spouse present..    GOALS:   Multidisciplinary Problems     Physical Therapy Goals        Problem: Physical Therapy Goal    Goal Priority Disciplines Outcome Goal Variances Interventions   Physical Therapy Goal     PT, PT/OT Ongoing (interventions implemented as appropriate)     Description:  Goals to be met by: 19    Patient will increase functional independence with mobility by performin. Supine to sit with supervision  2. Sit to stand transfer with Supervision  3. Gait x500 feet with Supervision using Rolling Walker if needed  4. Lower extremity exercise program x30 reps per handout, with supervision                      Time Tracking:     PT  Received On: 03/23/19  PT Start Time: 0952     PT Stop Time: 1020  PT Total Time (min): 28 min     Billable Minutes: Gait Training 14 and Therapeutic Exercise 14    Treatment Type: Treatment  PT/PTA: PTA     PTA Visit Number: 1     Ruthie Wilson, PTA  03/23/2019

## 2019-03-23 NOTE — PLAN OF CARE
Problem: Physical Therapy Goal  Goal: Physical Therapy Goal  Goals to be met by: 19    Patient will increase functional independence with mobility by performin. Supine to sit with supervision  2. Sit to stand transfer with Supervision  3. Gait x500 feet with Supervision using Rolling Walker if needed  4. Lower extremity exercise program x30 reps per handout, with supervision     Outcome: Ongoing (interventions implemented as appropriate)  Pt is making gains with functional mobility and gait distance.  Pt ambulated ~200ft with RW, SBA.  Pt will have assistance from spouse and sister upon returning home.

## 2019-03-23 NOTE — ASSESSMENT & PLAN NOTE
Home regimen of amlodipine, hydrochlorothiazide, and losartan, and provide as-needed clonidine.  Uncontrolled today.  Added prn Hydralazine.  Added Lopressor.  BP much improved.  Continue current management.    Resume home medications at discharge

## 2019-03-23 NOTE — ASSESSMENT & PLAN NOTE
-s/p R iliofemoral bypass 3/20/19   -Diabetic diet  -Cont PO pain medication with IV breakthrough  -F/u Hospitalist recs for optimization of medical comorbidities  -Cont PT and eval for rehab

## 2019-03-23 NOTE — SUBJECTIVE & OBJECTIVE
Medications:  Continuous Infusions:  Scheduled Meds:   amLODIPine  10 mg Oral Daily    aspirin  81 mg Oral Daily    heparin (porcine)  5,000 Units Subcutaneous Q8H    insulin aspart U-100  8 Units Subcutaneous TIDWM    insulin detemir U-100  35 Units Subcutaneous QHS    losartan-hydrochlorothiazide 50-12.5 mg  2 tablet Oral Daily    metoprolol tartrate  25 mg Oral BID    mupirocin  1 g Nasal BID     PRN Meds:acetaminophen, cloNIDine, dextrose 50%, dextrose 50%, glucagon (human recombinant), glucose, glucose, hydrALAZINE, HYDROmorphone, insulin aspart U-100, naloxone, ondansetron, oxyCODONE-acetaminophen     Objective:     Vital Signs (Most Recent):  Temp: 98.8 °F (37.1 °C) (03/22/19 2016)  Pulse: 99 (03/22/19 2016)  Resp: 18 (03/22/19 2016)  BP: (!) 141/66 (03/22/19 2016)  SpO2: 97 % (03/22/19 2016) Vital Signs (24h Range):  Temp:  [98.8 °F (37.1 °C)-100.3 °F (37.9 °C)] 98.8 °F (37.1 °C)  Pulse:  [] 99  Resp:  [17-48] 18  SpO2:  [93 %-99 %] 97 %  BP: (118-174)/() 141/66  Arterial Line BP: ()/(46-61) 107/56     Date 03/22/19 0700 - 03/23/19 0659   Shift 1034-7512 5536-3121 9204-6481 24 Hour Total   INTAKE   P.O.  120  120   Shift Total(mL/kg)  120(1.5)  120(1.5)   OUTPUT   Urine(mL/kg/hr) 0(0)   0   Shift Total(mL/kg) 0(0)   0(0)   Weight (kg) 81.3 81.3 81.3 81.3       Physical Exam   Constitutional: She appears well-developed and well-nourished. No distress.   HENT:   Head: Normocephalic and atraumatic.   Eyes: Conjunctivae are normal.   Neck: Neck supple.   Cardiovascular: Normal rate.   Pulses:       Femoral pulses are 2+ on the right side, and 2+ on the left side.       Dorsalis pedis pulses are 2+ on the right side, and Detected w/ doppler on the left side.        Posterior tibial pulses are Detected w/ doppler on the right side, and Detected w/ doppler on the left side.   Pulmonary/Chest: Effort normal. No respiratory distress.   Abdominal: Soft. She exhibits no distension and no  mass. There is no tenderness. There is no rebound and no guarding. No hernia.   Musculoskeletal: Normal range of motion. She exhibits tenderness. She exhibits no edema or deformity.   Feet:   Right Foot:   Skin Integrity: Negative for ulcer.   Left Foot:   Skin Integrity: Negative for ulcer.   Neurological: She is alert. No sensory deficit.   Skin: Skin is warm and dry. Capillary refill takes less than 2 seconds. No rash noted. She is not diaphoretic. No erythema. No pallor.        Psychiatric: She has a normal mood and affect.   Vitals reviewed.      Significant Labs:  All pertinent labs from the last 24 hours have been reviewed.    Significant Diagnostics:  I have reviewed all pertinent imaging results/findings within the past 24 hours.

## 2019-03-24 VITALS
TEMPERATURE: 98 F | SYSTOLIC BLOOD PRESSURE: 130 MMHG | WEIGHT: 179.25 LBS | HEART RATE: 96 BPM | DIASTOLIC BLOOD PRESSURE: 61 MMHG | BODY MASS INDEX: 31.76 KG/M2 | OXYGEN SATURATION: 95 % | HEIGHT: 63 IN | RESPIRATION RATE: 18 BRPM

## 2019-03-24 PROBLEM — E87.5 HYPERKALEMIA: Status: RESOLVED | Noted: 2019-03-21 | Resolved: 2019-03-24

## 2019-03-24 LAB
POCT GLUCOSE: 153 MG/DL (ref 70–110)
POCT GLUCOSE: 176 MG/DL (ref 70–110)

## 2019-03-24 PROCEDURE — 99024 PR POST-OP FOLLOW-UP VISIT: ICD-10-PCS | Mod: ,,, | Performed by: SURGERY

## 2019-03-24 PROCEDURE — 25000003 PHARM REV CODE 250: Performed by: HOSPITALIST

## 2019-03-24 PROCEDURE — 63600175 PHARM REV CODE 636 W HCPCS: Performed by: SURGERY

## 2019-03-24 PROCEDURE — 25000003 PHARM REV CODE 250: Performed by: SURGERY

## 2019-03-24 PROCEDURE — 99024 POSTOP FOLLOW-UP VISIT: CPT | Mod: ,,, | Performed by: SURGERY

## 2019-03-24 PROCEDURE — 25500020 PHARM REV CODE 255: Performed by: SURGERY

## 2019-03-24 RX ORDER — OXYCODONE AND ACETAMINOPHEN 5; 325 MG/1; MG/1
1 TABLET ORAL EVERY 6 HOURS PRN
Qty: 30 TABLET | Refills: 0 | Status: SHIPPED | OUTPATIENT
Start: 2019-03-24 | End: 2019-04-26 | Stop reason: SDUPTHER

## 2019-03-24 RX ADMIN — IOHEXOL 125 ML: 350 INJECTION, SOLUTION INTRAVENOUS at 11:03

## 2019-03-24 RX ADMIN — HEPARIN SODIUM 5000 UNITS: 5000 INJECTION, SOLUTION INTRAVENOUS; SUBCUTANEOUS at 05:03

## 2019-03-24 RX ADMIN — ASPIRIN 81 MG: 81 TABLET, COATED ORAL at 08:03

## 2019-03-24 RX ADMIN — OXYCODONE AND ACETAMINOPHEN 1 TABLET: 5; 325 TABLET ORAL at 10:03

## 2019-03-24 RX ADMIN — MUPIROCIN 1 G: 20 OINTMENT TOPICAL at 08:03

## 2019-03-24 RX ADMIN — LOSARTAN POTASSIUM AND HYDROCHLOROTHIAZIDE 2 TABLET: 12.5; 5 TABLET ORAL at 08:03

## 2019-03-24 RX ADMIN — DOCUSATE SODIUM 100 MG: 100 CAPSULE, LIQUID FILLED ORAL at 08:03

## 2019-03-24 RX ADMIN — AMLODIPINE BESYLATE 10 MG: 5 TABLET ORAL at 08:03

## 2019-03-24 RX ADMIN — METOPROLOL TARTRATE 25 MG: 25 TABLET ORAL at 08:03

## 2019-03-24 RX ADMIN — INSULIN ASPART 10 UNITS: 100 INJECTION, SOLUTION INTRAVENOUS; SUBCUTANEOUS at 08:03

## 2019-03-24 NOTE — SUBJECTIVE & OBJECTIVE
Medications:  Continuous Infusions:  Scheduled Meds:   amLODIPine  10 mg Oral Daily    aspirin  81 mg Oral Daily    docusate sodium  100 mg Oral BID    heparin (porcine)  5,000 Units Subcutaneous Q8H    insulin aspart U-100  10 Units Subcutaneous TIDWM    insulin detemir U-100  35 Units Subcutaneous QHS    losartan-hydrochlorothiazide 50-12.5 mg  2 tablet Oral Daily    metoprolol tartrate  25 mg Oral BID    mupirocin  1 g Nasal BID     PRN Meds:acetaminophen, cloNIDine, dextrose 50%, dextrose 50%, glucagon (human recombinant), glucose, glucose, hydrALAZINE, insulin aspart U-100, naloxone, ondansetron, oxyCODONE-acetaminophen     Objective:     Vital Signs (Most Recent):  Temp: 98.2 °F (36.8 °C) (03/24/19 0745)  Pulse: 96 (03/24/19 0745)  Resp: 18 (03/24/19 0745)  BP: 130/61 (03/24/19 0745)  SpO2: 95 % (03/24/19 0745) Vital Signs (24h Range):  Temp:  [98.2 °F (36.8 °C)-98.9 °F (37.2 °C)] 98.2 °F (36.8 °C)  Pulse:  [] 96  Resp:  [18] 18  SpO2:  [92 %-96 %] 95 %  BP: (115-152)/(58-67) 130/61     Date 03/24/19 0700 - 03/25/19 0659   Shift 0703-8681 5502-1744 0929-2756 24 Hour Total   INTAKE   P.O. 120   120   Shift Total(mL/kg) 120(1.5)   120(1.5)   OUTPUT   Shift Total(mL/kg)       Weight (kg) 81.3 81.3 81.3 81.3       Physical Exam   Constitutional: She appears well-developed and well-nourished. No distress.   HENT:   Head: Normocephalic and atraumatic.   Eyes: Conjunctivae are normal.   Neck: Neck supple.   Cardiovascular: Normal rate.   Pulses:       Femoral pulses are 2+ on the right side, and 2+ on the left side.       Dorsalis pedis pulses are 2+ on the right side, and Detected w/ doppler on the left side.        Posterior tibial pulses are Detected w/ doppler on the right side, and Detected w/ doppler on the left side.   Pulmonary/Chest: Effort normal. No respiratory distress.   Abdominal: Soft. She exhibits no distension and no mass. There is no tenderness. There is no rebound and no guarding.  No hernia.   Musculoskeletal: Normal range of motion. She exhibits no edema, tenderness or deformity.   Feet:   Right Foot:   Skin Integrity: Negative for ulcer.   Left Foot:   Skin Integrity: Negative for ulcer.   Neurological: She is alert. No sensory deficit.   Skin: Skin is warm and dry. Capillary refill takes less than 2 seconds. No rash noted. She is not diaphoretic. No erythema. No pallor.        Psychiatric: She has a normal mood and affect.   Vitals reviewed.      Significant Labs:  All pertinent labs from the last 24 hours have been reviewed.    Significant Diagnostics:  I have reviewed all pertinent imaging results/findings within the past 24 hours.

## 2019-03-24 NOTE — PROGRESS NOTES
[facility] Hospital Medicine  Progress Note    Patient Name: Nelsy Marino  MRN: [id number]  Patient Class: IP- Inpatient   Admission Date: 3/20/2019  Length of Stay: 4 days  Attending Physician: Danny Dunbar MD  Primary Care Provider: [facility]        Subjective:     Principal Problem:PVD (peripheral vascular disease)    HPI:  Mrs. Nelsy Marino is a 78 y.o. female with essential hypertension, type 2 diabetes mellitus (HbA1c unknown), peripheral vascular disease, and history of DVT who presents to [facility] today for planned right iliofemoral bypass.  She reports doing well after the operation and says that she has mild pain to her right leg.  She denies any dyspnea or chest pain and is otherwise in good spirits.    Hospital Course:  77 y/o female admitted by Vascular Surgery after an ileofemoral bypass on 3/20.  Hx of DM and HTN.  Hospital Medicine consulted for medical management.  Medically stable.  Evaluated by PT who recommends home health.  Resume home medications with no changes on discharge.     Interval History:  Remains stable. No pain. Had a BM yesterday. Apparently did not discharge yesterday due to desat while working with PT. No notes from PT or sats <88% documented in chart.     Review of Systems   Constitutional: Negative for appetite change.   Respiratory: Negative for cough and shortness of breath.    Cardiovascular: Negative for chest pain and leg swelling.   Gastrointestinal: Negative for abdominal pain, constipation, nausea and vomiting.   Genitourinary: Negative for difficulty urinating and dysuria.     Objective:     Vital Signs (Most Recent):  Temp: 98.2 °F (36.8 °C) (03/24/19 0745)  Pulse: 96 (03/24/19 0745)  Resp: 18 (03/24/19 0745)  BP: 130/61 (03/24/19 0745)  SpO2: 95 % (03/24/19 0745) Vital Signs (24h Range):  Temp:  [98.2 °F (36.8 °C)-98.9 °F (37.2 °C)] 98.2 °F (36.8 °C)  Pulse:  [] 96  Resp:  [18] 18  SpO2:  [92 %-96 %] 95 %  BP:  (115-152)/(58-67) 130/61     Weight: 81.3 kg (179 lb 3.7 oz)  Body mass index is 32.26 kg/m².    Intake/Output Summary (Last 24 hours) at 3/24/2019 1007  Last data filed at 3/24/2019 0821  Gross per 24 hour   Intake 240 ml   Output 200 ml   Net 40 ml      Physical Exam   Constitutional: She appears well-developed and well-nourished. No distress.   HENT:   Head: Normocephalic and atraumatic.   Nose: Nose normal.   Mouth/Throat: Oropharynx is clear and moist.   Cardiovascular: Normal rate, regular rhythm, normal heart sounds and intact distal pulses. Exam reveals no gallop and no friction rub.   No murmur heard.  Pulmonary/Chest: Effort normal and breath sounds normal. No stridor. No respiratory distress. She has no wheezes. She has no rales.   Abdominal: Bowel sounds are normal. She exhibits no distension and no mass. There is no tenderness. There is no guarding.   Bandage clean dry intact   Musculoskeletal: She exhibits no edema.   Neurological: She is alert.   Skin: Skin is warm and dry. She is not diaphoretic.   Nursing note and vitals reviewed.      Significant Labs: All pertinent labs within the past 24 hours have been reviewed.    Significant Imaging: I have reviewed and interpreted all pertinent imaging results/findings within the past 24 hours.    Assessment/Plan:      * PVD (peripheral vascular disease)  S/p Ileofemoral bypass on 3/20.  Treatment and plan per Primary.    Hyperkalemia  Treated with IV Calcium Gluconate.  Resolved       History of DVT (deep vein thrombosis)  Stable; there are no acute issues.    Obesity (BMI 30-39.9)  Body mass index is 32.26 kg/m².  Encourage weight loss      Diastolic CHF, chronic  No evidence of acute exacerbation.  Continue to monitor.      Type 2 diabetes mellitus  Patient is on a home regimen of metformin, a sulfonylurea, and mixed insulin therapy; will provide basal-prandial insulin therapy along with insulin sliding scale.  Uncontrolled with hyperglycemia. A1c 9.7%  (3/2019)  Will increase regimen.  Continue Novolog/Levemir while in hospital.  Resume home 70/30 regimen and PO meds upon discharge.    Essential hypertension  Home regimen of amlodipine, hydrochlorothiazide, and losartan, and provide as-needed clonidine.  Uncontrolled today.  Added prn Hydralazine.  Added Lopressor.  BP much improved.  Continue current management.    Resume home medications at discharge      VTE Risk Mitigation (From admission, onward)        Ordered     heparin (porcine) injection 5,000 Units  Every 8 hours      03/21/19 1845     Place sequential compression device  Until discontinued      03/20/19 1808     IP VTE HIGH RISK PATIENT  Once      03/20/19 1808        Medically stable for discharge to home with home health PT, PCP follow up, and Vascular follow up.     Jenniffer Cerrato MD  Department of Hospital Medicine   [facility]

## 2019-03-24 NOTE — SUBJECTIVE & OBJECTIVE
Interval History:  Remains stable. No pain. Had a BM yesterday. Apparently did not discharge yesterday due to desat while working with PT. No notes from PT or sats <88% documented in chart.     Review of Systems   Constitutional: Negative for appetite change.   Respiratory: Negative for cough and shortness of breath.    Cardiovascular: Negative for chest pain and leg swelling.   Gastrointestinal: Negative for abdominal pain, constipation, nausea and vomiting.   Genitourinary: Negative for difficulty urinating and dysuria.     Objective:     Vital Signs (Most Recent):  Temp: 98.2 °F (36.8 °C) (03/24/19 0745)  Pulse: 96 (03/24/19 0745)  Resp: 18 (03/24/19 0745)  BP: 130/61 (03/24/19 0745)  SpO2: 95 % (03/24/19 0745) Vital Signs (24h Range):  Temp:  [98.2 °F (36.8 °C)-98.9 °F (37.2 °C)] 98.2 °F (36.8 °C)  Pulse:  [] 96  Resp:  [18] 18  SpO2:  [92 %-96 %] 95 %  BP: (115-152)/(58-67) 130/61     Weight: 81.3 kg (179 lb 3.7 oz)  Body mass index is 32.26 kg/m².    Intake/Output Summary (Last 24 hours) at 3/24/2019 1007  Last data filed at 3/24/2019 0821  Gross per 24 hour   Intake 240 ml   Output 200 ml   Net 40 ml      Physical Exam   Constitutional: She appears well-developed and well-nourished. No distress.   HENT:   Head: Normocephalic and atraumatic.   Nose: Nose normal.   Mouth/Throat: Oropharynx is clear and moist.   Cardiovascular: Normal rate, regular rhythm, normal heart sounds and intact distal pulses. Exam reveals no gallop and no friction rub.   No murmur heard.  Pulmonary/Chest: Effort normal and breath sounds normal. No stridor. No respiratory distress. She has no wheezes. She has no rales.   Abdominal: Bowel sounds are normal. She exhibits no distension and no mass. There is no tenderness. There is no guarding.   Bandage clean dry intact   Musculoskeletal: She exhibits no edema.   Neurological: She is alert.   Skin: Skin is warm and dry. She is not diaphoretic.   Nursing note and vitals  reviewed.      Significant Labs: All pertinent labs within the past 24 hours have been reviewed.    Significant Imaging: I have reviewed and interpreted all pertinent imaging results/findings within the past 24 hours.

## 2019-03-24 NOTE — PROGRESS NOTES
COURTNEY contacted Nurse Hubbard at 11:54AM to report that pt can be discharged from case management standpoint.  Pt accepted by [facility] for PT and SN services.

## 2019-03-24 NOTE — ASSESSMENT & PLAN NOTE
-s/p R iliofemoral bypass 3/20/19 - recovered well  -Diabetic diet  -Cont PO pain medication prn  -PT rec HH PT - orders placed  -Cont to keep inc clean/dry

## 2019-03-24 NOTE — PROGRESS NOTES
[facility]  Vascular Surgery  Progress Note    Patient Name: Nelsy Marino  MRN: [id number]  Admission Date: 3/20/2019  Primary Care Provider: Juan F Garay    Subjective:     Interval History: Pt doing well after diuresis.  Pain controlled.    Post-Op Info:  Procedure(s) (LRB):  CREATION, BYPASS, ARTERIAL, ILIAC TO FEMORAL, RIGHT LOWER EXTREMITY, RIGHT PROFUNDAPLASTY (Right)   4 Days Post-Op       Medications:  Continuous Infusions:  Scheduled Meds:   amLODIPine  10 mg Oral Daily    aspirin  81 mg Oral Daily    docusate sodium  100 mg Oral BID    heparin (porcine)  5,000 Units Subcutaneous Q8H    insulin aspart U-100  10 Units Subcutaneous TIDWM    insulin detemir U-100  35 Units Subcutaneous QHS    losartan-hydrochlorothiazide 50-12.5 mg  2 tablet Oral Daily    metoprolol tartrate  25 mg Oral BID    mupirocin  1 g Nasal BID     PRN Meds:acetaminophen, cloNIDine, dextrose 50%, dextrose 50%, glucagon (human recombinant), glucose, glucose, hydrALAZINE, insulin aspart U-100, naloxone, ondansetron, oxyCODONE-acetaminophen     Objective:     Vital Signs (Most Recent):  Temp: 98.2 °F (36.8 °C) (03/24/19 0745)  Pulse: 96 (03/24/19 0745)  Resp: 18 (03/24/19 0745)  BP: 130/61 (03/24/19 0745)  SpO2: 95 % (03/24/19 0745) Vital Signs (24h Range):  Temp:  [98.2 °F (36.8 °C)-98.9 °F (37.2 °C)] 98.2 °F (36.8 °C)  Pulse:  [] 96  Resp:  [18] 18  SpO2:  [92 %-96 %] 95 %  BP: (115-152)/(58-67) 130/61     Date 03/24/19 0700 - 03/25/19 0659   Shift 3443-7194 8138-1572 4656-2268 24 Hour Total   INTAKE   P.O. 120   120   Shift Total(mL/kg) 120(1.5)   120(1.5)   OUTPUT   Shift Total(mL/kg)       Weight (kg) 81.3 81.3 81.3 81.3       Physical Exam   Constitutional: She appears well-developed and well-nourished. No distress.   HENT:   Head: Normocephalic and atraumatic.   Eyes: Conjunctivae are normal.   Neck: Neck supple.   Cardiovascular: Normal rate.   Pulses:       Femoral pulses are 2+ on the right  side, and 2+ on the left side.       Dorsalis pedis pulses are 2+ on the right side, and Detected w/ doppler on the left side.        Posterior tibial pulses are Detected w/ doppler on the right side, and Detected w/ doppler on the left side.   Pulmonary/Chest: Effort normal. No respiratory distress.   Abdominal: Soft. She exhibits no distension and no mass. There is no tenderness. There is no rebound and no guarding. No hernia.   Musculoskeletal: Normal range of motion. She exhibits no edema, tenderness or deformity.   Feet:   Right Foot:   Skin Integrity: Negative for ulcer.   Left Foot:   Skin Integrity: Negative for ulcer.   Neurological: She is alert. No sensory deficit.   Skin: Skin is warm and dry. Capillary refill takes less than 2 seconds. No rash noted. She is not diaphoretic. No erythema. No pallor.        Psychiatric: She has a normal mood and affect.   Vitals reviewed.      Significant Labs:  All pertinent labs from the last 24 hours have been reviewed.    Significant Diagnostics:  I have reviewed all pertinent imaging results/findings within the past 24 hours.    Assessment/Plan:     * PVD (peripheral vascular disease)  -s/p R iliofemoral bypass 3/20/19 - recovered well  -Diabetic diet  -Cont PO pain medication prn  -PT rec HH PT - orders placed  -Cont to keep inc clean/dry    Obesity (BMI 30-39.9)  -Diabetic diet    Diastolic CHF, chronic  -cont to monitor volume status closely  -strict I/Os    Type 2 diabetes mellitus  -sliding scale insulin  -glucose checks  -diabetic diet    Essential hypertension  -cont home medications        aDnny Dunbar MD  Vascular Surgery  [facility] [place]

## 2019-03-24 NOTE — PLAN OF CARE
Problem: Adult Inpatient Plan of Care  Goal: Plan of Care Review  Outcome: Ongoing (interventions implemented as appropriate)     03/24/19 7656   Plan of Care Review   Plan of Care Reviewed With patient   Pt s/p R iliofemoral bypass 3/20/19. Dressing to RLQ abd and R groin CDI with scant amount of serosanguinous drainage. New gauze applied to R groin, staples intact. Pt c/o moderate pain, managed well with PRN pain pill.  at bedside. Pt assisted to restroom as needed. BG WNL. Urinating well. VSSAF. No other complaints at this time.

## 2019-03-24 NOTE — PLAN OF CARE
03/24/19 1156   Final Note   Assessment Type Final Discharge Note   Anticipated Discharge Disposition Home-Health   What phone number can be called within the next 1-3 days to see how you are doing after discharge? [phone]   Hospital Follow Up  Appt(s) scheduled? No  (Pt will have to contact [facility] to schedule a hospital f/u appointment on Monday 3/25/19.)   Discharge plans and expectations educations in teach back method with documentation complete? Yes   Right Care Referral Info   Post Acute Recommendation Home-care

## 2019-03-24 NOTE — NURSING
Pt's discharge instructions and follow up appt given. Verbalized understandings. IV discontinued. Belongings at bedside. Transport requested

## 2019-03-24 NOTE — PT/OT/SLP PROGRESS
Physical Therapy      Patient Name:  Nelsy Marino   MRN:  [id number]    Patient not seen today secondary to patient off unit to  CT/MRI. Will follow-up at a later time/ date .    Susana Sim, ROBERTO

## 2019-03-24 NOTE — PROGRESS NOTES
[facility]    WRITTEN HEALTHCARE AND DISCHARGE INFORMATION  Follow-up Information     Danny Dunbar MD In 4 weeks.    Specialties:  Vascular Surgery, Surgery  Why:  with arterial US, ARCHIE/toe pressures and CTA runoff  Contact information:  [address]  [address]  [place]  [phone]             [facility].    Why:  Please contact [facility] at above number on Monday 3/25/19 to schedule a hospital f/u appointment and establish PCP  Contact information:  [address] [facility]  [place] [address]  [phone]                   Help at Home           [phone]  After discharge for assistance [facility] On Call Nurse Care Line 24/7  Assistance     Things You are responsible For To Manage Your Care At Home:  1.    Getting your prescriptions filled   2.    Taking your medications as directed, DO NOT MISS ANY DOSES!  3.    Going to your follow-up doctor appointment. This is important because it  allow the doctor to monitor your progress and determine if  any changes need to made to your treatment plan.     Thank you for choosing [facility] for your care.  Please answer any calls you may receive from [facility] we want to continue to support you as you manage your healthcare needs. [facility] is happy to have the opportunity to serve you.      Sincerely,  Your [facility] Team,  Angela

## 2019-03-25 NOTE — PT/OT/SLP DISCHARGE
Physical Therapy Discharge Summary    Name: Nelsy Marino  MRN: [id number]   Principal Problem: PVD (peripheral vascular disease)     Patient Discharged from acute Physical Therapy on 3/24/19.  Please refer to prior PT noted date on 3/23/19 for functional status.     Assessment:     Goals partially met. Patient appropriate for care in another setting.    Objective:     GOALS:   Multidisciplinary Problems     Physical Therapy Goals        Problem: Physical Therapy Goal    Goal Priority Disciplines Outcome Goal Variances Interventions   Physical Therapy Goal     PT, PT/OT Ongoing (interventions implemented as appropriate)     Description:  Goals to be met by: 19    Patient will increase functional independence with mobility by performin. Supine to sit with supervision  2. Sit to stand transfer with Supervision  3. Gait x500 feet with Supervision using Rolling Walker if needed  4. Lower extremity exercise program x30 reps per handout, with supervision                      Reasons for Discontinuation of Therapy Services  Transfer to alternate level of care.      Plan:     Patient Discharged to: Home with Home Health Service.    Nona Rueda, PT  3/25/2019

## 2019-03-26 NOTE — DISCHARGE SUMMARY
DATE OF ADMISSION:  03/20/2019.    DATE OF DISCHARGE:  03/24/2019.    SERVICE:  Vascular Surgery    ATTENDING:  Danny Dunbar M.D.    PREOPERATIVE DIAGNOSES:  1.  Right lower extremity atherosclerosis with ischemic rest pain and lifestyle   limiting claudication.  2.  Hypertension.  3.  Hyperlipidemia.  4.  Obesity.  5.  Diabetes mellitus.    DISCHARGE DIAGNOSES:  1.  Right lower extremity atherosclerosis with ischemic rest pain and lifestyle   limiting claudication.  2.  Hypertension.  3.  Hyperlipidemia.  4.  Obesity.  5.  Diabetes mellitus.    PROCEDURE PERFORMED:  Right common iliac artery to right common femoral artery   bypass with 6 mm Dacron graft.    CONSULTATIONS:  1.  Hospital Medicine.  2.  Physical therapy.    HISTORY OF PRESENT ILLNESS:  A 78-year-old black female with the above listed   medical history, who was seen in clinic for right lower extremity   atherosclerosis and claudication initially in 2018.  This improved with medical   treatment, although worsened on subsequent visit and developed into ischemic   rest pain.  Imaging was obtained and it was determined that the patient will   benefit from a right iliofemoral bypass.  The risks and benefits were discussed   with the patient, the patient stated understanding and desired to proceed with   surgical intervention.    BRIEF HOSPITAL COURSE:  The patient was admitted on 03/20/2019.  She was taken   to the Operating Room for a right iliofemoral bypass.  The patient tolerated the   procedure well and there were no complications.  Please see separately dictated   operative report for further details of the procedure.  The patient was   admitted to the ICU on postop day 0.  She was transfused for postoperative blood   loss anemia and her pulmonary status as well as neurovascular status was   monitored closely while in the ICU.  She remained hemodynamically stable and she   was started on a clear liquid diet with gentle hydration.  She had a  strong   triphasic DP on postop day 0.  She was making adequate urine output.  She   remained stable on postop day #1 and was able to work with physical therapy.    Her pain was controlled with a PCA and on postop day #1, this was transitioned   to p.o. pain medication with IV for breakthrough pain.    Her [place] catheter was removed and she maintained adequate urine output on her   own without difficulty.  Advanced to a diabetic diet.  Her labs remained stable.    She had intermittent tachycardia for which she remained in ICU on postop day   #1.  By postoperative day #2, her tachycardia had improved and her pain was   controlled with p.o. pain medication.  She was transferred to the floor.  Her   dressings were removed and remained clean, dry and intact.  She was able to   ambulate with physical therapy.  Her right foot pain preoperatively had   resolved.  She was noted to have a palpable pulse, DP and a triphasic PT.  Her   home medications were resumed all of her fluids were stopped by IV.  Her   arterial line had also been removed.  She remained hemodynamically stable.  She   ambulated on the floor with physical therapy and home health Physical Therapy   was recommended.  Hospitalist recommendations were followed.  She did have   desaturation of oxygen with physical therapy and ambulation on postop day #3 for   which she was diuresed with Lasix with improvement in her pulmonary status.    Her oxygen was weaned off and she maintained good oxygen saturations.  There   were no new issues or acute events during the remainder of her hospitalization.    She was in stable condition and can be discharged from the hospital on postop   day #4.  A CTA abdomen and pelvis with runoff was obtained prior to discharge,   which showed a patent iliofemoral bypass graft and no flow-limiting stenosis.    DISPOSITION:  Home with Home Health physical therapy and Home Health nursing.    DISCHARGE CONDITION:  Stable.    DISCHARGE  MEDICATIONS:  The patient was instructed to continue all home   medications as previously described.  She was given discharge prescriptions for   the following medications:  1.  Percocet 1 tab p.o. q. 6 hours p.r.n. pain.    DISCHARGE INSTRUCTIONS:  The patient was instructed to call the Vascular Surgery   Clinic resident on call with any questions or concerns including intractable   nausea or vomiting, fevers greater than 101.5, not controlled with   over-the-counter pain medication or any severe pain, erythema or purulence noted   to her incision.  She was instructed to keep her right groin incision clean and   follow up with nursing recommendations and to keep dry gauze in her right groin   at all times.    FOLLOWUP:  The patient is instructed to follow up with Vascular Surgery Clinic   in four weeks with arterial ultrasound, CTA abdomen and pelvis with runoff and   ABIs and toe pressures.      CCL/IN  dd: 03/26/2019 18:00:40 (CDT)  td: 03/26/2019 18:50:35 (CDT)  Doc ID   #[id number]  Job ID #[id number]    CC:

## 2019-03-27 ENCOUNTER — TELEPHONE (OUTPATIENT)
Dept: VASCULAR SURGERY | Facility: CLINIC | Age: 79
End: 2019-03-27

## 2019-03-27 NOTE — TELEPHONE ENCOUNTER
----- Message from Fiordaliza Joel sent at 3/27/2019 12:08 PM CDT -----  Contact: Maritza Leavitt with [employer] - [phone]  Type: Patient Call Back    Who called:  Nurse Maritza    What is the request in detail: Rep asking for a call back regading pt's care. She states is at the home with the pt now.     Can the clinic reply by [facility]? NO      Best call back number: [phone]    1241 Call to  nurse about patient. She stated that the patient has been having more drainage and that she has had to change her dressing about 4x per day.  nurse also stated that one of her staples in her groin seemed to be coming out. Explained would talk to Dr. Dunbar can return her call. Spoke with Dr. Dunbar. He stated for the nurse to monitor her area. If she had any concern of infection to let us know or if any other staples were coming out. Also stated to change the dressing as needed to keep the area clean and dry. The  nurse stated understanding.

## 2019-04-05 ENCOUNTER — TELEPHONE (OUTPATIENT)
Dept: VASCULAR SURGERY | Facility: CLINIC | Age: 79
End: 2019-04-05

## 2019-04-05 NOTE — TELEPHONE ENCOUNTER
Call to patient to inform her of appointment made after home health nurse called concerned of groin incision opening. Gave patient date, time and location for appointment on Monday. She stated understanding.

## 2019-04-08 ENCOUNTER — OFFICE VISIT (OUTPATIENT)
Dept: VASCULAR SURGERY | Facility: CLINIC | Age: 79
End: 2019-04-08
Payer: MEDICARE

## 2019-04-08 VITALS — HEART RATE: 81 BPM | WEIGHT: 167.25 LBS | HEIGHT: 60 IN | BODY MASS INDEX: 32.83 KG/M2

## 2019-04-08 DIAGNOSIS — I73.9 PVD (PERIPHERAL VASCULAR DISEASE): Primary | ICD-10-CM

## 2019-04-08 PROCEDURE — 99999 PR PBB SHADOW E&M-EST. PATIENT-LVL III: CPT | Mod: PBBFAC,,, | Performed by: SURGERY

## 2019-04-08 PROCEDURE — 99024 POSTOP FOLLOW-UP VISIT: CPT | Mod: S$GLB,,, | Performed by: SURGERY

## 2019-04-08 PROCEDURE — 1101F PT FALLS ASSESS-DOCD LE1/YR: CPT | Mod: CPTII,S$GLB,, | Performed by: SURGERY

## 2019-04-08 PROCEDURE — 1101F PR PT FALLS ASSESS DOC 0-1 FALLS W/OUT INJ PAST YR: ICD-10-PCS | Mod: CPTII,S$GLB,, | Performed by: SURGERY

## 2019-04-08 PROCEDURE — 99999 PR PBB SHADOW E&M-EST. PATIENT-LVL III: ICD-10-PCS | Mod: PBBFAC,,, | Performed by: SURGERY

## 2019-04-08 PROCEDURE — 99024 PR POST-OP FOLLOW-UP VISIT: ICD-10-PCS | Mod: S$GLB,,, | Performed by: SURGERY

## 2019-04-08 RX ORDER — OXYCODONE AND ACETAMINOPHEN 5; 325 MG/1; MG/1
1 TABLET ORAL EVERY 6 HOURS PRN
Qty: 15 TABLET | Refills: 0 | Status: SHIPPED | OUTPATIENT
Start: 2019-04-08 | End: 2019-04-22

## 2019-04-08 NOTE — PATIENT INSTRUCTIONS
Wound Care    You have a break in the skin. This wound may be because of an injury. Or it may be the result of surgery. Closing the wound helps stop bleeding, protects the wound from infection, and speeds healing. The type of closure that is used depends on the size and location of the wound. Choices include stitches (sutures), strips of surgical tape, skin glue, or staples.  Home care  Your healthcare provider may prescribe medicines for pain. Or he or she may suggest an over-the-counter (OTC) pain reliever, such as ibuprofen. If you have chronic kidney disease, talk with your provider before taking any OTC medicines. Also talk with your provider if you've had a stomach ulcer or gastrointestinal bleeding. In certain cases, antibiotics may be prescribed to help prevent infection. If antibiotics are prescribed, take them exactly as directed for as long as directed. Do not stop taking your antibiotics until they are all gone, even if you feel better.  General care  · Follow the healthcare providers instructions on how to care for the wound.  · Wash your hands with soap and warm water before and after caring for the wound. This helps prevent infection.  · If a bandage was applied, change it once a day or as directed. If at any time the bandage becomes wet or dirty, replace it with a new one.  · Unless told otherwise, avoid soaking the wound in water. Take showers or sponge baths instead of tub baths. Don't scrub or pick at the wound.  · Don't go swimming.  · If you have a bandage and it gets wet, use a clean cloth to gently pat the wound dry. Then replace the bandage with a dry one.  · Don't scratch, rub, or pick at the area.  · Watch for the signs of infection listed below. Any wound can get infected, even if you are taking antibiotics. Seek care right away if you see any possible signs of infection.  Care for specific closures  · Sutures. You may want to clean the wound daily after the first 2 to 3 days. To do  this, remove the bandage and gently wash the area with soap and warm water. After cleaning, apply a thin layer of antibiotic ointment if recommended. Then apply a new bandage. Sutures on the outside of the skin usually need to be removed by your healthcare provider.  · Surgical tape. Keep the area dry. If it gets wet, blot it dry with a towel. Surgical tape closures usually fall off within 7 to 10 days. If they have not fallen off after 10 days, you can remove them yourself. To remove the tape, use mineral oil or petroleum jelly on a cotton ball to gently rub the adhesive.  · Skin glue. You may shower or bathe as usual, but do not use soaps, lotions, or ointments on the wound area. Do not scrub the wound. After bathing, pat the wound dry with a soft towel. Do not apply liquids like peroxide, ointments, or creams to the wound while the strips or film is in place. Don't scratch, rub, or pick at the strips or film. Don't put tape directly over the strips or film. Skin adhesive film will fall off naturally in 5 to 10 days. If it does not peel off in 10 days, gently rub petroleum jelly or an ointment onto the film.  · Staples. Take showers or sponge baths. Don't take tub baths. Don't use lotions on the wound area. The area may be cleaned with soap and water 2 to 3 days after the wound was stapled. Don't scrub the wound. Pat it dry with a clean soft cloth or towel. You can use antibiotic ointment if your provider tells you to. Staples will need to be removed by your healthcare provider in 10 to 14 days.  Follow-up care  Follow up with your healthcare provider, or as directed. If you have sutures or staples, return for their removal as directed.  When to seek medical advice  Call your healthcare provider right away if you have signs of infection:  · Fever of 100.4°F (38°C) or higher, or as directed by your healthcare provider  · Increasing pain in the wound  · Increasing redness or swelling  · Pus or bad-smelling drainage  from the wound  Also call your provider right away if any of these occur:  · Wound bleeds more than a small amount or wont stop bleeding  · Wound edges come apart  · Numbness or weakness in the wound area that doesnt go away  Date Last Reviewed: 1/7/2016 © 2000-2017 [employer]. [address] [place] [address]. All rights reserved. This information is not intended as a substitute for professional medical care. Always follow your healthcare professional's instructions.        Wound Care  Taking proper care of your wound will help it heal. Your healthcare provider may show you how to clean and dress the wound. He or she will also explain how to tell if the wound is healing normally. If you are unsure of how to take care of the wound, be sure to clarify what dressing to use and how often you should change the bandages. Here are the basic steps.     A wound that's not healing normally may be dark in color or have white streaks.   Wash your hands  Tips for washing your hands include:  · Use liquid soap and lather for 2 minutes. Scrub between your fingers and under your nails.  · Rinse with warm water, keeping your fingers pointing down.  · Use a paper towel to dry your hands and to turn off the faucet.  Remove the used dressing  Here are suggestions for removing the dressing:  · If dressing changes cause you pain, be sure to take your pain medicine as prescribed by your healthcare provider 30 minutes before dressing changes.  · Set up your supplies.  · Put on disposable gloves if youre dressing a wound for someone else or your wound is infected.  · Loosen the tape by pulling gently toward the wound.  · Gently take off the old dressing. If the dressing is stuck to the wound, moisten it with saline (if available) or clean water.  · If you have a drain or tube in the wound, be careful not to pull on it.  · Remove the dressing 1 layer at a time and put it in a plastic bag.  · Remove your  gloves.  Inspect and dress the wound  Check the wound carefully:  · Each time you change the dressing, inspect the wound carefully to be sure its healing normally by making sure your wound appears to be pink and moist, and is free of infection.    · Wash your hands again. Put on a new pair of gloves.  · Clean and dress the wound as directed by your healthcare provider or nurse. Do not put anything in the wound that is not prescribed or directed by your healthcare provider. If you have a drain or tube, be careful not to pull on it. Make sure to secure the drain or tube as well.  · Put all supplies in a plastic bag. Seal the bag and put it in the trash.  · Be sure to wash your hands again.  Call your healthcare provider  Call your healthcare provider if you see any of the following signs of a problem:  · Bleeding that soaks the dressing  · Pink fluid weeping from the wound  · Increased drainage or drainage that is yellow, yellow-green, or foul-smelling  · Increased swelling or pain, or redness or swelling in the skin around the wound  · A change in the color of the wound, or if streaks develop in a direction away from the wound  · The area between any stitches opens up  · An increase in the size of the wound  · A fever of 100.4°F (38°C) or higher, or as directed by your healthcare provider  · Chills, increased fatigue, or a loss of appetite      Date Last Reviewed: 7/30/2015  © 7047-1539 [employer]. [address] [place] [address]. All rights reserved. This information is not intended as a substitute for professional medical care. Always follow your healthcare professional's instructions.        Wound Check, No Infection  Your wound is healing as expected. There are no signs of infection.   Home care  Continue to care for your wound as directed.  · Cover your wound with a bandage unless your healthcare provider tells you not to.  · Gently clean your wound with soap and water when you  shower.   · Unless told otherwise, avoid swimming and taking tub baths until your wound has healed.  Follow-up care  Follow up with your healthcare provider as advised.  · If you have sutures or staples, return as directed to have them removed. If they are not taken out on time, they may be harder to remove and scarring may be worse. Infection may develop.  · If surgical tape strips were used, you can remove them yourself if they have not fallen off by 10 days after they were applied.   When to seek medical advice  Call your healthcare provider right away if any of these occur:  · Fever of 100.4ºF (38ºC) or higher, or as directed by your health care provider  · Symptoms of a wound infection, including:  ¨ Redness or swelling around the wound  ¨ Warmth coming from the wound  ¨ New or worsening pain  ¨ Red streaks around the wound  ¨ Draining pus  Date Last Reviewed: 8/24/2015  © 8249-7801 [employer]. [address] [place] [address], [place] [address]. All rights reserved. This information is not intended as a substitute for professional medical care. Always follow your healthcare professional's instructions.

## 2019-04-08 NOTE — PROGRESS NOTES
Danny Dunbar MD VI                       [facility]                         04/08/2019    HPI:  Nelsy Marino is a 78 y.o. female with   Patient Active Problem List   Diagnosis    Lichen sclerosus et atrophicus    PVD (peripheral vascular disease)    Essential hypertension    Type 2 diabetes mellitus    Diastolic CHF, chronic    Obesity (BMI 30-39.9)    History of DVT (deep vein thrombosis)    being managed by PCP and specialists who is here today for evaluation of RLE pain.  She is s/p fem-fem approx 20 yrs ago without issues until recently.  Fem-fem that was performed for RLE ALI was noted to be occluded on US at [facility].  Was scheduled for an angiogram at [facility] although was unable to proceed due to insurance purposes.  Patient states location is R calf and thigh occurring for 2 mo.  Associated signs and symptoms include none.  Quality is throbbing and severity is 9/10 at worst.  Symptoms began 2 mo ago.  Alleviating factors include rest.  Worsening factors include ambulation.  Pt states RLE claudication x 1 block.  States it is lifestyle limiting.     no MI  no Stroke  Tobacco use: quit 35 yrs ago    1/14/19: cont to c/o RLE pain, 1/2 block calf claudication.  Intermittent rest pain, no tissue loss.  Compliant with Pletal, has not noticed significant improvement.    2/14/19: Cont to experience 1/2 block R calf claudication.  No wounds.      Interval history: s/p R iliofemoral bypass 3/20/19.  C/o R groin inc dehiscence by [facility] RN.  No F/C.  States RLE ischemic rest pain resolved, +ambulating better without issues.    Past Medical History:   Diagnosis Date    CHF (congestive heart failure)     Diabetes mellitus     DVT (deep venous thrombosis)     Hypertension     Miscarriage      Past Surgical History:   Procedure Laterality Date     femoral vascular surgery       CREATION, BYPASS, ARTERIAL, ILIAC TO FEMORAL, RIGHT LOWER EXTREMITY, RIGHT PROFUNDAPLASTY Right  3/20/2019    Performed by Danny Dunbar MD at [facility] OR    HYSTERECTOMY      ?unsure cervix present     Family History   Problem Relation Age of Onset    Diabetes Father     Hypertension Father      Social History     Socioeconomic History    Marital status:      Spouse name: Not on file    Number of children: Not on file    Years of education: Not on file    Highest education level: Not on file   Occupational History    Not on file   Social Needs    Financial resource strain: Not on file    Food insecurity:     Worry: Not on file     Inability: Not on file    Transportation needs:     Medical: Not on file     Non-medical: Not on file   Tobacco Use    Smoking status: Former Smoker     Last attempt to quit: 1980     Years since quittin.2    Smokeless tobacco: Never Used   Substance and Sexual Activity    Alcohol use: No    Drug use: No    Sexual activity: Never   Lifestyle    Physical activity:     Days per week: Not on file     Minutes per session: Not on file    Stress: Not on file   Relationships    Social connections:     Talks on phone: Not on file     Gets together: Not on file     Attends Yarsanism service: Not on file     Active member of club or organization: Not on file     Attends meetings of clubs or organizations: Not on file     Relationship status: Not on file   Other Topics Concern    Not on file   Social History Narrative    Not on file       Current Outpatient Medications:     amlodipine (NORVASC) 10 MG tablet, Take 10 mg by mouth once daily., Disp: , Rfl:     aspirin (ECOTRIN) 81 MG EC tablet, Take 81 mg by mouth once daily., Disp: , Rfl:     BLOOD SUGAR DIAGNOSTIC (TRUE METRIX GLUCOSE TEST STRIP MISC), by Misc.(Non-Drug; Combo Route) route., Disp: , Rfl:     cilostazol (PLETAL) 50 MG Tab, Take 1 tablet (50 mg total) by mouth 2 (two) times daily. If patient tolerate medication after 4 weeks, increase dose to 100 mg twice daily., Disp: 60 tablet, Rfl: 11     "glipiZIDE (GLUCOTROL) 10 MG tablet, Take 10 mg by mouth 2 (two) times daily before meals., Disp: , Rfl:     insulin aspart protamine-insulin aspart (NOVOLOG 70/30) 100 unit/mL (70-30) InPn pen, Inject 45 Units into the skin every morning. , Disp: , Rfl:     insulin syringe-needle,dispos. 0.3 mL 30 gauge x 5/16" Syrg, by Misc.(Non-Drug; Combo Route) route., Disp: , Rfl:     losartan-hydrochlorothiazide 100-25 mg (HYZAAR) 100-25 mg per tablet, Take 1 tablet by mouth once daily., Disp: , Rfl:     metformin (GLUCOPHAGE) 1000 MG tablet, Take 1,000 mg by mouth 2 (two) times daily with meals., Disp: , Rfl:     oxyCODONE-acetaminophen (PERCOCET) 5-325 mg per tablet, Take 1 tablet by mouth every 6 (six) hours as needed., Disp: 30 tablet, Rfl: 0    insulin aspart protamine-insulin aspart (NOVOLOG MIX 70-30FLEXPEN U-100) 100 unit/mL (70-30) InPn pen, Inject 40 Units into the skin every evening., Disp: , Rfl:     REVIEW OF SYSTEMS:  General: No fevers or chills; ENT: No sore throat; Allergy and Immunology: no persistent infections; Hematological and Lymphatic: No history of bleeding or easy bruising; Endocrine: negative; Respiratory: no cough, shortness of breath, or wheezing; Cardiovascular: no chest pain or dyspnea on exertion; Gastrointestinal: no abdominal pain/back, change in bowel habits, or bloody stools; Genito-Urinary: no dysuria, trouble voiding, or hematuria; Musculoskeletal: negative; Neurological: no TIA or stroke symptoms; Psychiatric: no nervousness, anxiety or depression.    PHYSICAL EXAM:   Right Arm BP - Sittin/60 (19 0847)  Left Arm BP - Sittin/56 (19 0847)  Pulse: 81         General appearance:  Alert, well-appearing, and in no distress.  Oriented to person, place, and time                    Neurological: Normal speech, no focal findings noted; CN II - XII grossly intact. RLE with sensation to light touch, LLE with sensation to light touch.            " Musculoskeletal: Digits/nail without cyanosis/clubbing.  Strength 5/5 BLE.                    Neck: Supple, no significant adenopathy                  Chest:  Clear to auscultation, no wheezes, rales or rhonchi, symmetric air entry. No use of accessory muscles               Cardiac: Normal rate and regular rhythm, S1 and S2 normal            Abdomen: Soft, nontender, nondistended, no masses or organomegaly, no hernia     No rebound tenderness noted; bowel sounds normal     R groin inc with dehiscence superiorly of skin and to mid aspect, no purulence or graft exposure, abdominal inc healing well +staples without infection      Extremities:   2+ R femoral pulse, 2+ L femoral pulse     doppler+ R popliteal pulse, doppler+ L popliteal pulse     doppler+ R PT pulse, doppler+ L PT pulse     2+ R DP pulse, doppler+ L DP pulse     no RLE edema, no LLE edema    Skin: RLE without tissue loss; LLE without tissue loss    LAB RESULTS:  No results found for: CBC  Lab Results   Component Value Date    LABPROT 10.7 03/23/2019    INR 1.0 03/23/2019     Lab Results   Component Value Date     03/23/2019    K 4.1 03/23/2019     03/23/2019    CO2 26 03/23/2019     (H) 03/23/2019    BUN 28 (H) 03/23/2019    CREATININE 1.3 03/23/2019    CALCIUM 9.3 03/23/2019    ANIONGAP 7 (L) 03/23/2019    EGFRNONAA 39 (A) 03/23/2019     Lab Results   Component Value Date    WBC 12.28 03/23/2019    RBC 3.35 (L) 03/23/2019    HGB 8.7 (L) 03/23/2019    HCT 27.4 (L) 03/23/2019    MCV 82 03/23/2019    MCH 26.0 (L) 03/23/2019    MCHC 31.8 (L) 03/23/2019    RDW 15.3 (H) 03/23/2019     03/23/2019    MPV 11.2 03/23/2019    GRAN 9.9 (H) 03/23/2019    GRAN 80.3 (H) 03/23/2019    LYMPH 1.3 03/23/2019    LYMPH 10.5 (L) 03/23/2019    MONO 0.9 03/23/2019    MONO 7.4 03/23/2019    EOS 0.2 03/23/2019    BASO 0.01 03/23/2019    EOSINOPHIL 1.7 03/23/2019    BASOPHIL 0.1 03/23/2019    DIFFMETHOD Automated 03/23/2019     .  Lab Results   Component  Value Date    HGBA1C 9.7 (H) 03/20/2019       IMAGING:  All pertinent imaging has been reviewed and interpreted independently.    11/8/18: US showing occluded fem-fem, decreased velocity R CFA, HD significant stenosis R SFA    [facility] ARCHIE: 0.4/1    1/14/19: ARCHIE R 0.4/1.1, flat L toe waveform, R toe pressure 64 mmHg    2/2019: Stress test without evidence of ischemia.  Echo with normal function.    IMP/PLAN:  78 y.o. female with   Patient Active Problem List   Diagnosis    Lichen sclerosus et atrophicus    PVD (peripheral vascular disease)    Essential hypertension    Type 2 diabetes mellitus    Diastolic CHF, chronic    Obesity (BMI 30-39.9)    History of DVT (deep vein thrombosis)    being managed by PCP and specialists who is here today for evaluation of RLE claudication, lifestyle limiting.    -Recovering well s/p R iliofemoral bypass with skin dehiscence minimally of R groin inc, 2+ DP with resolution of claudication and ischemic rest pain- recommend wet to dry dressing BID with normal saline to areas of dehiscence only keeping surrounding skin clean and dry; warm soapy water to groin inc daily; recommend daily HH RN visits  -Cont ASA, statin and Pletal  -Heart healthy lifestyle  -Cont smoking cessation  -RTC 1 week for wound check    I spent 20 minutes evaluating this patient and greater than 50% of the time was spent counseling, coordinator care and discussing the plan of care.  All questions were answered and patient stated understanding with agreement with the above treatment plan.    Danny Dunbar MD [facility]  Vascular and Endovascular Surgery

## 2019-04-08 NOTE — LETTER
April 8, 2019        [place] [address] [facility] [address]             [facility] - Vascular Surgery  [address] [facility] [address]  [facility] [address]  Phone: [phone]  Fax: [phone]   Patient: Nelsy Marino   MR Number: [id number]   YOB: 1940   Date of Visit: 4/8/2019       Dear Dr. Garay:    Thank you for referring Nelsy Marino to me for evaluation. Below are the relevant portions of my assessment and plan of care.            If you have questions, please do not hesitate to call me. I look forward to following Nelsy along with you.    Sincerely,      Danny Dunbar MD           CC  No Recipients

## 2019-04-11 ENCOUNTER — OFFICE VISIT (OUTPATIENT)
Dept: VASCULAR SURGERY | Facility: CLINIC | Age: 79
DRG: 908 | End: 2019-04-11
Payer: MEDICARE

## 2019-04-11 ENCOUNTER — TELEPHONE (OUTPATIENT)
Dept: VASCULAR SURGERY | Facility: CLINIC | Age: 79
End: 2019-04-11

## 2019-04-11 ENCOUNTER — HOSPITAL ENCOUNTER (INPATIENT)
Facility: HOSPITAL | Age: 79
LOS: 6 days | Discharge: HOME HEALTH | DRG: 908 | End: 2019-04-17
Attending: INTERNAL MEDICINE | Admitting: SURGERY
Payer: MEDICARE

## 2019-04-11 ENCOUNTER — HOSPITAL ENCOUNTER (OUTPATIENT)
Dept: RADIOLOGY | Facility: HOSPITAL | Age: 79
Discharge: HOME | DRG: 908 | End: 2019-04-11
Attending: SURGERY
Payer: MEDICARE

## 2019-04-11 VITALS — HEART RATE: 108 BPM | HEIGHT: 60 IN | BODY MASS INDEX: 32.65 KG/M2 | WEIGHT: 166.31 LBS

## 2019-04-11 DIAGNOSIS — T81.31XD DEHISCENCE OF INCISION, SUBSEQUENT ENCOUNTER: Primary | ICD-10-CM

## 2019-04-11 DIAGNOSIS — T81.30XA WOUND DEHISCENCE: Primary | ICD-10-CM

## 2019-04-11 DIAGNOSIS — T81.31XD DEHISCENCE OF INCISION, SUBSEQUENT ENCOUNTER: ICD-10-CM

## 2019-04-11 DIAGNOSIS — L03.90 CELLULITIS, UNSPECIFIED CELLULITIS SITE: ICD-10-CM

## 2019-04-11 DIAGNOSIS — T88.8XXA FLUID COLLECTION AT SURGICAL SITE, INITIAL ENCOUNTER: ICD-10-CM

## 2019-04-11 LAB
ABO + RH BLD: NORMAL
ALBUMIN SERPL BCP-MCNC: 3.3 G/DL (ref 3.5–5.2)
ALP SERPL-CCNC: 68 U/L (ref 55–135)
ALT SERPL W/O P-5'-P-CCNC: 8 U/L (ref 10–44)
ANION GAP SERPL CALC-SCNC: 7 MMOL/L (ref 8–16)
AST SERPL-CCNC: 16 U/L (ref 10–40)
BASOPHILS # BLD AUTO: 0.03 K/UL (ref 0–0.2)
BASOPHILS NFR BLD: 0.3 % (ref 0–1.9)
BILIRUB SERPL-MCNC: 0.5 MG/DL (ref 0.1–1)
BLD GP AB SCN CELLS X3 SERPL QL: NORMAL
BUN SERPL-MCNC: 24 MG/DL (ref 8–23)
CALCIUM SERPL-MCNC: 9.8 MG/DL (ref 8.7–10.5)
CHLORIDE SERPL-SCNC: 102 MMOL/L (ref 95–110)
CO2 SERPL-SCNC: 25 MMOL/L (ref 23–29)
CREAT SERPL-MCNC: 1.4 MG/DL (ref 0.5–1.4)
DIFFERENTIAL METHOD: ABNORMAL
EOSINOPHIL # BLD AUTO: 0.2 K/UL (ref 0–0.5)
EOSINOPHIL NFR BLD: 2.3 % (ref 0–8)
ERYTHROCYTE [DISTWIDTH] IN BLOOD BY AUTOMATED COUNT: 15 % (ref 11.5–14.5)
EST. GFR  (AFRICAN AMERICAN): 41.5 ML/MIN/1.73 M^2
EST. GFR  (NON AFRICAN AMERICAN): 36 ML/MIN/1.73 M^2
GLUCOSE SERPL-MCNC: 228 MG/DL (ref 70–110)
HCT VFR BLD AUTO: 32.9 % (ref 37–48.5)
HGB BLD-MCNC: 10.1 G/DL (ref 12–16)
IMM GRANULOCYTES # BLD AUTO: 0.03 K/UL (ref 0–0.04)
IMM GRANULOCYTES NFR BLD AUTO: 0.3 % (ref 0–0.5)
INR PPP: 1.1 (ref 0.8–1.2)
LYMPHOCYTES # BLD AUTO: 1.3 K/UL (ref 1–4.8)
LYMPHOCYTES NFR BLD: 12.8 % (ref 18–48)
MAGNESIUM SERPL-MCNC: 1.9 MG/DL (ref 1.6–2.6)
MCH RBC QN AUTO: 25.1 PG (ref 27–31)
MCHC RBC AUTO-ENTMCNC: 30.7 G/DL (ref 32–36)
MCV RBC AUTO: 82 FL (ref 82–98)
MONOCYTES # BLD AUTO: 0.6 K/UL (ref 0.3–1)
MONOCYTES NFR BLD: 5.3 % (ref 4–15)
NEUTROPHILS # BLD AUTO: 8.3 K/UL (ref 1.8–7.7)
NEUTROPHILS NFR BLD: 79 % (ref 38–73)
NRBC BLD-RTO: 0 /100 WBC
PHOSPHATE SERPL-MCNC: 4.1 MG/DL (ref 2.7–4.5)
PLATELET # BLD AUTO: 373 K/UL (ref 150–350)
PMV BLD AUTO: 10 FL (ref 9.2–12.9)
POTASSIUM SERPL-SCNC: 5 MMOL/L (ref 3.5–5.1)
PROT SERPL-MCNC: 8.6 G/DL (ref 6–8.4)
PROTHROMBIN TIME: 11.5 SEC (ref 9–12.5)
RBC # BLD AUTO: 4.03 M/UL (ref 4–5.4)
SODIUM SERPL-SCNC: 134 MMOL/L (ref 136–145)
WBC # BLD AUTO: 10.47 K/UL (ref 3.9–12.7)

## 2019-04-11 PROCEDURE — 63600175 PHARM REV CODE 636 W HCPCS: Performed by: STUDENT IN AN ORGANIZED HEALTH CARE EDUCATION/TRAINING PROGRAM

## 2019-04-11 PROCEDURE — 99283 EMERGENCY DEPT VISIT LOW MDM: CPT | Mod: ,,, | Performed by: PHYSICIAN ASSISTANT

## 2019-04-11 PROCEDURE — 85025 COMPLETE CBC W/AUTO DIFF WBC: CPT

## 2019-04-11 PROCEDURE — 84100 ASSAY OF PHOSPHORUS: CPT

## 2019-04-11 PROCEDURE — 83735 ASSAY OF MAGNESIUM: CPT

## 2019-04-11 PROCEDURE — 87040 BLOOD CULTURE FOR BACTERIA: CPT | Mod: 59

## 2019-04-11 PROCEDURE — 73701 CT LOWER EXTREMITY W/DYE: CPT | Mod: TC,RT

## 2019-04-11 PROCEDURE — 80053 COMPREHEN METABOLIC PANEL: CPT

## 2019-04-11 PROCEDURE — 87186 SC STD MICRODIL/AGAR DIL: CPT

## 2019-04-11 PROCEDURE — 85610 PROTHROMBIN TIME: CPT

## 2019-04-11 PROCEDURE — 87070 CULTURE OTHR SPECIMN AEROBIC: CPT

## 2019-04-11 PROCEDURE — 86850 RBC ANTIBODY SCREEN: CPT

## 2019-04-11 PROCEDURE — 20600001 HC STEP DOWN PRIVATE ROOM

## 2019-04-11 PROCEDURE — 99024 PR POST-OP FOLLOW-UP VISIT: ICD-10-PCS | Mod: S$GLB,,, | Performed by: SURGERY

## 2019-04-11 PROCEDURE — 25000003 PHARM REV CODE 250: Performed by: STUDENT IN AN ORGANIZED HEALTH CARE EDUCATION/TRAINING PROGRAM

## 2019-04-11 PROCEDURE — 73701 CT THIGH WITH CONTRAST RIGHT: ICD-10-PCS | Mod: 26,RT,, | Performed by: RADIOLOGY

## 2019-04-11 PROCEDURE — 25500020 PHARM REV CODE 255: Performed by: SURGERY

## 2019-04-11 PROCEDURE — 96365 THER/PROPH/DIAG IV INF INIT: CPT

## 2019-04-11 PROCEDURE — 73701 CT LOWER EXTREMITY W/DYE: CPT | Mod: 26,RT,, | Performed by: RADIOLOGY

## 2019-04-11 PROCEDURE — 99283 PR EMERGENCY DEPT VISIT,LEVEL III: ICD-10-PCS | Mod: ,,, | Performed by: PHYSICIAN ASSISTANT

## 2019-04-11 PROCEDURE — 99999 PR PBB SHADOW E&M-EST. PATIENT-LVL III: ICD-10-PCS | Mod: PBBFAC,,, | Performed by: SURGERY

## 2019-04-11 PROCEDURE — 25000003 PHARM REV CODE 250: Performed by: PHYSICIAN ASSISTANT

## 2019-04-11 PROCEDURE — 99024 POSTOP FOLLOW-UP VISIT: CPT | Mod: S$GLB,,, | Performed by: SURGERY

## 2019-04-11 PROCEDURE — 87077 CULTURE AEROBIC IDENTIFY: CPT

## 2019-04-11 PROCEDURE — 96366 THER/PROPH/DIAG IV INF ADDON: CPT

## 2019-04-11 PROCEDURE — 99999 PR PBB SHADOW E&M-EST. PATIENT-LVL III: CPT | Mod: PBBFAC,,, | Performed by: SURGERY

## 2019-04-11 PROCEDURE — 99285 EMERGENCY DEPT VISIT HI MDM: CPT | Mod: 25

## 2019-04-11 PROCEDURE — S0077 INJECTION, CLINDAMYCIN PHOSP: HCPCS | Performed by: PHYSICIAN ASSISTANT

## 2019-04-11 RX ORDER — VANCOMYCIN HCL IN 5 % DEXTROSE 1G/250ML
1000 PLASTIC BAG, INJECTION (ML) INTRAVENOUS
Status: DISCONTINUED | OUTPATIENT
Start: 2019-04-11 | End: 2019-04-14

## 2019-04-11 RX ORDER — ASPIRIN 81 MG/1
81 TABLET ORAL DAILY
Status: DISCONTINUED | OUTPATIENT
Start: 2019-04-12 | End: 2019-04-17 | Stop reason: HOSPADM

## 2019-04-11 RX ORDER — OXYCODONE AND ACETAMINOPHEN 5; 325 MG/1; MG/1
1 TABLET ORAL
Status: COMPLETED | OUTPATIENT
Start: 2019-04-11 | End: 2019-04-11

## 2019-04-11 RX ORDER — SODIUM CHLORIDE 0.9 % (FLUSH) 0.9 %
10 SYRINGE (ML) INJECTION
Status: DISCONTINUED | OUTPATIENT
Start: 2019-04-11 | End: 2019-04-17 | Stop reason: HOSPADM

## 2019-04-11 RX ORDER — CLINDAMYCIN PHOSPHATE 900 MG/50ML
900 INJECTION, SOLUTION INTRAVENOUS
Status: COMPLETED | OUTPATIENT
Start: 2019-04-11 | End: 2019-04-11

## 2019-04-11 RX ORDER — CILOSTAZOL 50 MG/1
50 TABLET ORAL 2 TIMES DAILY
Status: DISCONTINUED | OUTPATIENT
Start: 2019-04-11 | End: 2019-04-17 | Stop reason: HOSPADM

## 2019-04-11 RX ORDER — LOSARTAN POTASSIUM AND HYDROCHLOROTHIAZIDE 25; 100 MG/1; MG/1
1 TABLET ORAL DAILY
Status: DISCONTINUED | OUTPATIENT
Start: 2019-04-12 | End: 2019-04-16

## 2019-04-11 RX ORDER — AMLODIPINE BESYLATE 10 MG/1
10 TABLET ORAL DAILY
Status: DISCONTINUED | OUTPATIENT
Start: 2019-04-12 | End: 2019-04-17 | Stop reason: HOSPADM

## 2019-04-11 RX ADMIN — OXYCODONE HYDROCHLORIDE AND ACETAMINOPHEN 1 TABLET: 5; 325 TABLET ORAL at 09:04

## 2019-04-11 RX ADMIN — VANCOMYCIN HYDROCHLORIDE 1000 MG: 1 INJECTION, POWDER, LYOPHILIZED, FOR SOLUTION INTRAVENOUS at 11:04

## 2019-04-11 RX ADMIN — IOHEXOL 75 ML: 350 INJECTION, SOLUTION INTRAVENOUS at 02:04

## 2019-04-11 RX ADMIN — CLINDAMYCIN IN 5 PERCENT DEXTROSE 900 MG: 18 INJECTION, SOLUTION INTRAVENOUS at 09:04

## 2019-04-11 NOTE — PATIENT INSTRUCTIONS
Understanding Peripheral Arterial Disease    Peripheral arteries deliver oxygen-rich blood to the tissues outside the heart. As you age, your arteries become stiffer and thicker. In addition, risk factors, such as smoking and high cholesterol, can damage the artery lining. This allows a buildup of fat and other materials (plaque) to form within the artery walls. The buildup of plaque narrows the space inside the artery and sometimes blocks blood flow. Peripheral arterial disease (PAD) happens when blood flow through the arteries is reduced because of plaque buildup. It often happens in the legs and feet, but can also happen elsewhere in the body. If this buildup happens in the a large artery in the neck (carotid artery), it can be a major contributor to stroke.  A healthy artery  An artery is a muscular tube that carries oxgen rich blood and nutrients from the heart to the rest of the body. It has a smooth lining and flexible walls that allow blood to pass freely. When active, muscles need more oxygen. This increases blood flow. Healthy arteries can adapt to meet this need.  A damaged artery    PAD begins when the lining of an artery is damaged. This is often because of risk factors, such as smoking, older age, or diabetes. Plaque then starts to form within the artery wall. At this stage, blood flows normally, so youre not likely to have symptoms.  A narrowed artery    If plaque continues to build up, the space inside the artery narrows. The artery walls become less able to expand. The artery still provides enough blood and oxygen to your muscles during rest. But when youre active, the increased demand for blood cant be met. As a result, your leg may cramp or ache when you walk.  A blocked artery    An artery can become blocked by plaque or by a blood clot lodged in a narrowed section. When this happens, oxygen cant reach the muscle below the blockage. Then you may feel pain when lying down or when you are not  active (rest pain). This type of pain is especially common at night when youre lying flat. In time, the affected tissue can die. This can lead to the loss of a toe or foot.  Date Last Reviewed: 5/1/2016 © 2000-2017 [employer]. [address] [place] [id number]. All rights reserved. This information is not intended as a substitute for professional medical care. Always follow your healthcare professional's instructions.        Peripheral Artery Disease (PAD)     Peripheral artery disease (PAD) occurs when the arteries that carry blood to the limbs are narrowed or blocked. This is usually due to a buildup of a fatty substance called plaque in the walls of the arteries.  PAD most often affects the arteries in the legs. When these arteries are narrowed or blocked, blood flow to the legs is reduced. This can cause leg and foot pain and other symptoms. If severe enough, reduced blood flow to the legs can lead to tissue death (gangrene) and the loss of a toe, foot, or leg. Having PAD also makes it more likely that arteries in other body areas are blocked. For instance, arteries that carry blood to the heart or brain may be affected. This raises the chances of heart attack and stroke.  Risk factors  Certain factors can make PAD more likely. They include:  · Smoking  · Diabetes  · High blood pressure  · Unhealthy cholesterol levels  · Obesity  · Inactive lifestyle  · Older age  · Family history of PAD  Symptoms  Many people with PAD have no symptoms. If symptoms do occur, they can include:  · Pain in the muscles of the calves, thighs or hips that gets worse with activity and better with rest (intermittent claudication)  · Achy, tired, or heavy feeling in the legs  · Weakness, numbness, tingling, or loss of feeling in the legs  · Changes in skin color of the legs  · Sores on the legs and feet  · Cold leg, feet, or toes  · Pain the feet or toes even when lying down (rest pain)  Home care  PAD is a  chronic (lifelong) condition. Treatment is focused on managing your condition and lowering your health risks. This may include doing the following:  · If you smoke, quit. This helps prevent further damage to your arteries and lowers your health risks. Ask your provider about medicines or products that can help you quit smoking. Also consider joining a stop-smoking program or support group.  · Be more active. This helps you lose weight and manage problems such as high blood pressure and unhealthy cholesterol levels. Start a walking program if advised to by your provider. Your provider may also help you form a safe exercise program that is right for your needs.  · Make healthy eating changes. This includes eating less fat, salt, and sugar.  · Take medicines for high blood pressure, unhealthy cholesterol levels, and diabetes as directed.  · Have your blood pressure and cholesterol levels checked as often as directed.  · If you have diabetes, try to keep your blood sugar well controlled. Test your blood sugar as directed.  · If you are overweight, talk to your provider about forming a weight-loss plan.  · Watch for cuts, scrapes, or open sores on your feet. Poor blood flow to the feet may slow healing and increase the risk of infection from these problems.   Follow-up care  Follow up with your healthcare provider, or as advised. If imaging tests such as ultrasound were done, they will be reviewed by a doctor. You will be told the results and any new findings that may affect your care.  When to seek medical advice   Call your healthcare provider right away if any of these occur:  · Sudden severe pain in the legs or feet  · Sudden cold, pale or blue color in the legs or feet  · Weakness or numbness in the legs or feet that worsens  · Any sore or wound in the legs or feet that wont heal  · Weak pulse in your legs or feet  Know the Signs of Heart Attack and Stroke  People with PAD are at high risk for heart attack and  stroke. Knowing the signs of these problems can help you protect your health and get help when you need it. Call 911 right away if you have any of the following:  Signs of a Heart Attack  · Chest discomfort, such as pain, aching, tightness, or pressure that lasts more than a few minutes, or that comes and goes  · Pain or discomfort in the arms, back, shoulders, neck, or jaw  · Shortness of breath  · Sweating (often a cold, clammy sweat)  · Nausea  · Lightheadedness  Signs of a Stroke  · Sudden numbness or weakness of the face, arms, or legs, especially on one side  · Sudden confusion or trouble speaking or understanding  · Sudden trouble seeing in one or both eyes  · Sudden trouble walking, dizziness, or loss of balance  · Sudden, severe headache with no known cause   Date Last Reviewed: 9/21/2015  © 0825-4427 [employer]. [address] [place] [address]. All rights reserved. This information is not intended as a substitute for professional medical care. Always follow your healthcare professional's instructions.        A Walking Program for Peripheral Arterial Disease (PAD)  Peripheral arterial disease (PAD) is a condition where the arteries in the legs are severely damaged. When you have PAD, walking can be painful. So you may start to walk less. Walking less makes your leg muscles weaker. It then becomes harder and more painful to walk. A walking program can break this cycle. This sheet gives you tips on how to get started.     Walking farther without pain  Exercise strengthens leg muscles that are out of shape. It also trains these muscles to work with less oxygen. This helps your leg muscles work better even with reduced blood flow to your legs. When you have PAD, a walking program can be helpful. Your program can:  · Let you walk longer and farther without claudication. This is an ache in your legs during exercise that goes away with rest.  · Let you do more and be more active  · Add to  your overall health and well-being  · Help you control your blood sugar and blood pressure  · Help you become healthier with no risk and at little or no cost  Getting started  Your local hospital, vascular center, or cardiac rehab center may have a special walking program for people with PAD. If so, this is your best option. But if you cant find a program, or its not covered by insurance, you can still walk on your own. Follow these steps at each session:  · Step 1. Start at a pace that lets you walk for 5 to 10 minutes before you start to feel claudication. This feeling is unpleasant, but it doesnt hurt you. Keep going until the pain makes you feel that you need to stop.  · Step 2. Stop and rest for 3 to 5 minutes, just long enough for the pain to go away. You can rest standing or sitting. (Some people like to bring along a cane or a lightweight folding chair.)  · Step 3. Again walk at a pace that lets you walk for 5 to 10 minutes more before you feel pain. This may be slower than your starting pace in step 1. Then rest again.  · Step 4. Repeat this process until youve walked for 45 minutes. This should be about 60 to 80 minutes total, including resting time. You may not be able to do a full 45 minutes at first. Do as much as you can, and increase your time as you improve.  Making the most of your program  · Walk at least 3 times a week. Take no more than 2 days off between sessions. If you stop walking, even for a week or two, you can lose the health benefits of your program.  · Find a good place to walk. A treadmill or a track may be better at first. That way, you wont run the risk of going too far and finding that you cant walk back. Be sure to have a place to walk indoors in bad weather, such as a gym or a mall.  · Wear shoes with sturdy, flexible soles. The heel should fit without slipping. You should have room to wiggle your toes.  · Keep track of how long you walk. A pedometer will show your daily  progress. It can also show how much farther you can walk as time goes by.  · Ask a friend to keep you company. You may enjoy walking with someone else. Or you may want to make your walking sessions a time to relax by yourself.  · Make it fun. Listen to music while you walk and rest. Walk in a favorite park. Get to know the people at the gym, or the folks that you pass on your route. While you rest, you can window-shop, read, or feed the birds. Do whatever will help you enjoy your exercise sessions.  Date Last Reviewed: 6/1/2016  © 8501-8457 [employer]. [address] [place] [address], [place] [address]. All rights reserved. This information is not intended as a substitute for professional medical care. Always follow your healthcare professional's instructions.

## 2019-04-11 NOTE — PROGRESS NOTES
Danny Dunbar MD [facility]                         04/11/2019    HPI:  Nelsy Marino is a 78 y.o. female with   Patient Active Problem List   Diagnosis    Lichen sclerosus et atrophicus    PVD (peripheral vascular disease)    Essential hypertension    Type 2 diabetes mellitus    Diastolic CHF, chronic    Obesity (BMI 30-39.9)    History of DVT (deep vein thrombosis)    being managed by PCP and specialists who is here today for evaluation of RLE pain.  She is s/p fem-fem approx 20 yrs ago without issues until recently.  Fem-fem that was performed for RLE ALI was noted to be occluded on US at [facility].  Was scheduled for an angiogram at [facility] although was unable to proceed due to insurance purposes.  Patient states location is R calf and thigh occurring for 2 mo.  Associated signs and symptoms include none.  Quality is throbbing and severity is 9/10 at worst.  Symptoms began 2 mo ago.  Alleviating factors include rest.  Worsening factors include ambulation.  Pt states RLE claudication x 1 block.  States it is lifestyle limiting.     no MI  no Stroke  Tobacco use: quit 35 yrs ago    1/14/19: cont to c/o RLE pain, 1/2 block calf claudication.  Intermittent rest pain, no tissue loss.  Compliant with Pletal, has not noticed significant improvement.    2/14/19: Cont to experience 1/2 block R calf claudication.  No wounds.      4/8/19: s/p R iliofemoral bypass 3/20/19.  C/o R groin inc dehiscence by LUCIUS RN.  No F/C.  States RLE ischemic rest pain resolved, +ambulating better without issues.    Interval history:  Presents for further eval of R abundio inc dehiscence.  No F/C.    Past Medical History:   Diagnosis Date    CHF (congestive heart failure)     Diabetes mellitus     DVT (deep venous thrombosis)     Hypertension     Miscarriage      Past Surgical History:   Procedure Laterality Date     femoral vascular surgery       CREATION, BYPASS, ARTERIAL,  ILIAC TO FEMORAL, RIGHT LOWER EXTREMITY, RIGHT PROFUNDAPLASTY Right 3/20/2019    Performed by Danny [facility]bar MD at [facility]    HYSTERECTOMY      ?unsure cervix present     Family History   Problem Relation Age of Onset    Diabetes Father     Hypertension Father      Social History     Socioeconomic History    Marital status:      Spouse name: Not on file    Number of children: Not on file    Years of education: Not on file    Highest education level: Not on file   Occupational History    Not on file   Social Needs    Financial resource strain: Not on file    Food insecurity:     Worry: Not on file     Inability: Not on file    Transportation needs:     Medical: Not on file     Non-medical: Not on file   Tobacco Use    Smoking status: Former Smoker     Last attempt to quit:      Years since quittin.3    Smokeless tobacco: Never Used   Substance and Sexual Activity    Alcohol use: No    Drug use: No    Sexual activity: Never   Lifestyle    Physical activity:     Days per week: Not on file     Minutes per session: Not on file    Stress: Not on file   Relationships    Social connections:     Talks on phone: Not on file     Gets together: Not on file     Attends Anabaptism service: Not on file     Active member of club or organization: Not on file     Attends meetings of clubs or organizations: Not on file     Relationship status: Not on file   Other Topics Concern    Not on file   Social History Narrative    Not on file       Current Outpatient Medications:     amlodipine (NORVASC) 10 MG tablet, Take 10 mg by mouth once daily., Disp: , Rfl:     aspirin (ECOTRIN) 81 MG EC tablet, Take 81 mg by mouth once daily., Disp: , Rfl:     BLOOD SUGAR DIAGNOSTIC (TRUE METRIX GLUCOSE TEST STRIP MISC), by Misc.(Non-Drug; Combo Route) route., Disp: , Rfl:     cilostazol (PLETAL) 50 MG Tab, Take 1 tablet (50 mg total) by mouth 2 (two) times daily. If patient tolerate medication after 4  "weeks, increase dose to 100 mg twice daily., Disp: 60 tablet, Rfl: 11    glipiZIDE (GLUCOTROL) 10 MG tablet, Take 10 mg by mouth 2 (two) times daily before meals., Disp: , Rfl:     insulin aspart protamine-insulin aspart (NOVOLOG 70/30) 100 unit/mL (70-30) InPn pen, Inject 45 Units into the skin every morning. , Disp: , Rfl:     insulin aspart protamine-insulin aspart (NOVOLOG MIX 70-30FLEXPEN U-100) 100 unit/mL (70-30) InPn pen, Inject 40 Units into the skin every evening., Disp: , Rfl:     insulin syringe-needle,dispos. 0.3 mL 30 gauge x 5/16" Syrg, by Misc.(Non-Drug; Combo Route) route., Disp: , Rfl:     losartan-hydrochlorothiazide 100-25 mg (HYZAAR) 100-25 mg per tablet, Take 1 tablet by mouth once daily., Disp: , Rfl:     metformin (GLUCOPHAGE) 1000 MG tablet, Take 1,000 mg by mouth 2 (two) times daily with meals., Disp: , Rfl:     oxyCODONE-acetaminophen (PERCOCET) 5-325 mg per tablet, Take 1 tablet by mouth every 6 (six) hours as needed., Disp: 30 tablet, Rfl: 0    oxyCODONE-acetaminophen (PERCOCET) 5-325 mg per tablet, Take 1 tablet by mouth every 6 (six) hours as needed for Pain., Disp: 15 tablet, Rfl: 0    REVIEW OF SYSTEMS:  General: No fevers or chills; ENT: No sore throat; Allergy and Immunology: no persistent infections; Hematological and Lymphatic: No history of bleeding or easy bruising; Endocrine: negative; Respiratory: no cough, shortness of breath, or wheezing; Cardiovascular: no chest pain or dyspnea on exertion; Gastrointestinal: no abdominal pain/back, change in bowel habits, or bloody stools; Genito-Urinary: no dysuria, trouble voiding, or hematuria; Musculoskeletal: negative; Neurological: no TIA or stroke symptoms; Psychiatric: no nervousness, anxiety or depression.    PHYSICAL EXAM:   Right Arm BP - Sittin/60 (19 1306)  Left Arm BP - Sittin/60 (19 1306)  Pulse: 108         General appearance:  Alert, well-appearing, and in no distress.  Oriented to person, " place, and time                    Neurological: Normal speech, no focal findings noted; CN II - XII grossly intact. RLE with sensation to light touch, LLE with sensation to light touch.            Musculoskeletal: Digits/nail without cyanosis/clubbing.  Strength 5/5 BLE.                    Neck: Supple, no significant adenopathy                  Chest:  Clear to auscultation, no wheezes, rales or rhonchi, symmetric air entry. No use of accessory muscles               Cardiac: Normal rate and regular rhythm, S1 and S2 normal            Abdomen: Soft, nontender, nondistended, no masses or organomegaly, no hernia     No rebound tenderness noted; bowel sounds normal     R groin inc with dehiscence superiorly of skin and to mid aspect, no purulence or graft exposure, abdominal inc healing well +staples without infection      Extremities:   2+ R femoral pulse, 2+ L femoral pulse     doppler+ R popliteal pulse, doppler+ L popliteal pulse     doppler+ R PT pulse, doppler+ L PT pulse     2+ R DP pulse, doppler+ L DP pulse     no RLE edema, no LLE edema    Skin: RLE without tissue loss; LLE without tissue loss    LAB RESULTS:  No results found for: CBC  Lab Results   Component Value Date    LABPROT 10.7 03/23/2019    INR 1.0 03/23/2019     Lab Results   Component Value Date     03/23/2019    K 4.1 03/23/2019     03/23/2019    CO2 26 03/23/2019     (H) 03/23/2019    BUN 28 (H) 03/23/2019    CREATININE 1.3 03/23/2019    CALCIUM 9.3 03/23/2019    ANIONGAP 7 (L) 03/23/2019    EGFRNONAA 39 (A) 03/23/2019     Lab Results   Component Value Date    WBC 12.28 03/23/2019    RBC 3.35 (L) 03/23/2019    HGB 8.7 (L) 03/23/2019    HCT 27.4 (L) 03/23/2019    MCV 82 03/23/2019    MCH 26.0 (L) 03/23/2019    MCHC 31.8 (L) 03/23/2019    RDW 15.3 (H) 03/23/2019     03/23/2019    MPV 11.2 03/23/2019    GRAN 9.9 (H) 03/23/2019    GRAN 80.3 (H) 03/23/2019    LYMPH 1.3 03/23/2019    LYMPH 10.5 (L) 03/23/2019    MONO 0.9  03/23/2019    MONO 7.4 03/23/2019    EOS 0.2 03/23/2019    BASO 0.01 03/23/2019    EOSINOPHIL 1.7 03/23/2019    BASOPHIL 0.1 03/23/2019    DIFFMETHOD Automated 03/23/2019     .  Lab Results   Component Value Date    HGBA1C 9.7 (H) 03/20/2019       IMAGING:  All pertinent imaging has been reviewed and interpreted independently.    11/8/18: US showing occluded fem-fem, decreased velocity R CFA, HD significant stenosis R SFA    [facility] ARCHIE: 0.4/1    1/14/19: ARCHIE R 0.4/1.1, flat L toe waveform, R toe pressure 64 mmHg    2/2019: Stress test without evidence of ischemia.  Echo with normal function.    IMP/PLAN:  78 y.o. female with   Patient Active Problem List   Diagnosis    Lichen sclerosus et atrophicus    PVD (peripheral vascular disease)    Essential hypertension    Type 2 diabetes mellitus    Diastolic CHF, chronic    Obesity (BMI 30-39.9)    History of DVT (deep vein thrombosis)    being managed by PCP and specialists who is here today for evaluation of RLE claudication, lifestyle limiting.    -Recovering well s/p R iliofemoral bypass with skin dehiscence of R groin inc at superior and mid aspect, no graft exposure or fascial dehiscence, 2+ DP with resolution of claudication and ischemic rest pain- will obtain CT to eval for fluid collection  -recommend wet to dry dressing BID with normal saline to areas of dehiscence only keeping surrounding skin clean and dry; warm soapy water to groin inc daily; recommend daily HH RN visits  -Cont ASA, statin and Pletal  -Heart healthy lifestyle  -Cont smoking cessation  -RTC 4/15/19 for wound check    I spent 20 minutes evaluating this patient and greater than 50% of the time was spent counseling, coordinator care and discussing the plan of care.  All questions were answered and patient stated understanding with agreement with the above treatment plan.    Danny Dunbar MD [facility]  Vascular and Endovascular Surgery

## 2019-04-11 NOTE — TELEPHONE ENCOUNTER
Call to Ms. Marino. Explained that Dr. Dunbar stated that her CT came back and she has an infection. He wants her to go to [facility] to be admitted there so that she can get antibiotic and he may have to do surgery on her incision site. [facility] Dr. Dunbar would like for her to go tonight to get everthing started. She stated understanding.

## 2019-04-11 NOTE — LETTER
April 11, 2019        [place] [address] [place] [facility] [address]             [facility]  [address] [facility] [address]  [facility] [address]  Phone: [phone]  Fax: [phone]   Patient: Nelsy Marino   MR Number: [id number]   YOB: 1940   Date of Visit: 4/11/2019       Dear Dr. Garay:    Thank you for referring Nelsy Marino to me for evaluation. Below are the relevant portions of my assessment and plan of care.            If you have questions, please do not hesitate to call me. I look forward to following Nelsy along with you.    Sincerely,      Danny Dunbar MD           CC  No Recipients

## 2019-04-12 ENCOUNTER — ANESTHESIA (OUTPATIENT)
Dept: SURGERY | Facility: HOSPITAL | Age: 79
DRG: 908 | End: 2019-04-12
Payer: MEDICARE

## 2019-04-12 ENCOUNTER — ANESTHESIA EVENT (OUTPATIENT)
Dept: SURGERY | Facility: HOSPITAL | Age: 79
DRG: 908 | End: 2019-04-12
Payer: MEDICARE

## 2019-04-12 LAB
ALBUMIN SERPL BCP-MCNC: 3 G/DL (ref 3.5–5.2)
ALP SERPL-CCNC: 64 U/L (ref 55–135)
ALT SERPL W/O P-5'-P-CCNC: 6 U/L (ref 10–44)
ANION GAP SERPL CALC-SCNC: 8 MMOL/L (ref 8–16)
AST SERPL-CCNC: 14 U/L (ref 10–40)
BASOPHILS # BLD AUTO: 0.02 K/UL (ref 0–0.2)
BASOPHILS NFR BLD: 0.3 % (ref 0–1.9)
BILIRUB SERPL-MCNC: 0.7 MG/DL (ref 0.1–1)
BILIRUB UR QL STRIP: NEGATIVE
BUN SERPL-MCNC: 22 MG/DL (ref 8–23)
CALCIUM SERPL-MCNC: 9.6 MG/DL (ref 8.7–10.5)
CHLORIDE SERPL-SCNC: 103 MMOL/L (ref 95–110)
CLARITY UR REFRACT.AUTO: CLEAR
CO2 SERPL-SCNC: 24 MMOL/L (ref 23–29)
COLOR UR AUTO: YELLOW
CREAT SERPL-MCNC: 1.2 MG/DL (ref 0.5–1.4)
DIFFERENTIAL METHOD: ABNORMAL
EOSINOPHIL # BLD AUTO: 0.3 K/UL (ref 0–0.5)
EOSINOPHIL NFR BLD: 3.8 % (ref 0–8)
ERYTHROCYTE [DISTWIDTH] IN BLOOD BY AUTOMATED COUNT: 14.6 % (ref 11.5–14.5)
EST. GFR  (AFRICAN AMERICAN): 50 ML/MIN/1.73 M^2
EST. GFR  (NON AFRICAN AMERICAN): 43.4 ML/MIN/1.73 M^2
GLUCOSE SERPL-MCNC: 223 MG/DL (ref 70–110)
GLUCOSE UR QL STRIP: NEGATIVE
GRAM STN SPEC: NORMAL
GRAM STN SPEC: NORMAL
HCT VFR BLD AUTO: 31.3 % (ref 37–48.5)
HGB BLD-MCNC: 9.5 G/DL (ref 12–16)
HGB UR QL STRIP: NEGATIVE
IMM GRANULOCYTES # BLD AUTO: 0.01 K/UL (ref 0–0.04)
IMM GRANULOCYTES NFR BLD AUTO: 0.1 % (ref 0–0.5)
KETONES UR QL STRIP: NEGATIVE
LEUKOCYTE ESTERASE UR QL STRIP: ABNORMAL
LYMPHOCYTES # BLD AUTO: 1.2 K/UL (ref 1–4.8)
LYMPHOCYTES NFR BLD: 16.8 % (ref 18–48)
MAGNESIUM SERPL-MCNC: 1.7 MG/DL (ref 1.6–2.6)
MCH RBC QN AUTO: 24.7 PG (ref 27–31)
MCHC RBC AUTO-ENTMCNC: 30.4 G/DL (ref 32–36)
MCV RBC AUTO: 81 FL (ref 82–98)
MICROSCOPIC COMMENT: NORMAL
MONOCYTES # BLD AUTO: 0.5 K/UL (ref 0.3–1)
MONOCYTES NFR BLD: 6.8 % (ref 4–15)
NEUTROPHILS # BLD AUTO: 5.1 K/UL (ref 1.8–7.7)
NEUTROPHILS NFR BLD: 72.2 % (ref 38–73)
NITRITE UR QL STRIP: NEGATIVE
NRBC BLD-RTO: 0 /100 WBC
PH UR STRIP: 5 [PH] (ref 5–8)
PHOSPHATE SERPL-MCNC: 3.7 MG/DL (ref 2.7–4.5)
PLATELET # BLD AUTO: 337 K/UL (ref 150–350)
PMV BLD AUTO: 10.1 FL (ref 9.2–12.9)
POCT GLUCOSE: 139 MG/DL (ref 70–110)
POCT GLUCOSE: 217 MG/DL (ref 70–110)
POCT GLUCOSE: 222 MG/DL (ref 70–110)
POCT GLUCOSE: 236 MG/DL (ref 70–110)
POTASSIUM SERPL-SCNC: 4.4 MMOL/L (ref 3.5–5.1)
PROT SERPL-MCNC: 7.8 G/DL (ref 6–8.4)
PROT UR QL STRIP: NEGATIVE
RBC # BLD AUTO: 3.85 M/UL (ref 4–5.4)
RBC #/AREA URNS AUTO: 3 /HPF (ref 0–4)
SODIUM SERPL-SCNC: 135 MMOL/L (ref 136–145)
SP GR UR STRIP: >=1.03 (ref 1–1.03)
SQUAMOUS #/AREA URNS AUTO: 11 /HPF
URN SPEC COLLECT METH UR: ABNORMAL
WBC # BLD AUTO: 7.09 K/UL (ref 3.9–12.7)
WBC #/AREA URNS AUTO: 2 /HPF (ref 0–5)

## 2019-04-12 PROCEDURE — D9220A PRA ANESTHESIA: ICD-10-PCS | Mod: ANES,,, | Performed by: ANESTHESIOLOGY

## 2019-04-12 PROCEDURE — 85025 COMPLETE CBC W/AUTO DIFF WBC: CPT

## 2019-04-12 PROCEDURE — 20600001 HC STEP DOWN PRIVATE ROOM

## 2019-04-12 PROCEDURE — 81001 URINALYSIS AUTO W/SCOPE: CPT

## 2019-04-12 PROCEDURE — 37000009 HC ANESTHESIA EA ADD 15 MINS: Performed by: SURGERY

## 2019-04-12 PROCEDURE — 97597 PR DEBRIDEMENT OPEN WOUND 20 SQ CM<: ICD-10-PCS | Mod: 78,,, | Performed by: SURGERY

## 2019-04-12 PROCEDURE — S5010 5% DEXTROSE AND 0.45% SALINE: HCPCS | Performed by: STUDENT IN AN ORGANIZED HEALTH CARE EDUCATION/TRAINING PROGRAM

## 2019-04-12 PROCEDURE — 71000033 HC RECOVERY, INTIAL HOUR: Performed by: SURGERY

## 2019-04-12 PROCEDURE — 25000003 PHARM REV CODE 250: Performed by: STUDENT IN AN ORGANIZED HEALTH CARE EDUCATION/TRAINING PROGRAM

## 2019-04-12 PROCEDURE — 27201423 OPTIME MED/SURG SUP & DEVICES STERILE SUPPLY: Performed by: SURGERY

## 2019-04-12 PROCEDURE — 63600175 PHARM REV CODE 636 W HCPCS: Performed by: STUDENT IN AN ORGANIZED HEALTH CARE EDUCATION/TRAINING PROGRAM

## 2019-04-12 PROCEDURE — 36000707: Performed by: SURGERY

## 2019-04-12 PROCEDURE — 87116 MYCOBACTERIA CULTURE: CPT

## 2019-04-12 PROCEDURE — 97598 PR DEBRIDEMENT OPEN WOUND EA ADDL 20 SQ CM: ICD-10-PCS | Mod: ,,, | Performed by: SURGERY

## 2019-04-12 PROCEDURE — 87070 CULTURE OTHR SPECIMN AEROBIC: CPT

## 2019-04-12 PROCEDURE — D9220A PRA ANESTHESIA: Mod: ANES,,, | Performed by: ANESTHESIOLOGY

## 2019-04-12 PROCEDURE — 36415 COLL VENOUS BLD VENIPUNCTURE: CPT

## 2019-04-12 PROCEDURE — 87075 CULTR BACTERIA EXCEPT BLOOD: CPT

## 2019-04-12 PROCEDURE — 83735 ASSAY OF MAGNESIUM: CPT

## 2019-04-12 PROCEDURE — 87206 SMEAR FLUORESCENT/ACID STAI: CPT

## 2019-04-12 PROCEDURE — 36000706: Performed by: SURGERY

## 2019-04-12 PROCEDURE — D9220A PRA ANESTHESIA: Mod: CRNA,,, | Performed by: NURSE ANESTHETIST, CERTIFIED REGISTERED

## 2019-04-12 PROCEDURE — 87205 SMEAR GRAM STAIN: CPT

## 2019-04-12 PROCEDURE — 97597 DBRDMT OPN WND 1ST 20 CM/<: CPT | Mod: 78,,, | Performed by: SURGERY

## 2019-04-12 PROCEDURE — 63600175 PHARM REV CODE 636 W HCPCS: Performed by: NURSE ANESTHETIST, CERTIFIED REGISTERED

## 2019-04-12 PROCEDURE — 80053 COMPREHEN METABOLIC PANEL: CPT

## 2019-04-12 PROCEDURE — 37000008 HC ANESTHESIA 1ST 15 MINUTES: Performed by: SURGERY

## 2019-04-12 PROCEDURE — 82962 GLUCOSE BLOOD TEST: CPT | Performed by: SURGERY

## 2019-04-12 PROCEDURE — 25000003 PHARM REV CODE 250: Performed by: NURSE ANESTHETIST, CERTIFIED REGISTERED

## 2019-04-12 PROCEDURE — 87102 FUNGUS ISOLATION CULTURE: CPT

## 2019-04-12 PROCEDURE — D9220A PRA ANESTHESIA: ICD-10-PCS | Mod: CRNA,,, | Performed by: NURSE ANESTHETIST, CERTIFIED REGISTERED

## 2019-04-12 PROCEDURE — 84100 ASSAY OF PHOSPHORUS: CPT

## 2019-04-12 PROCEDURE — 97598 DBRDMT OPN WND ADDL 20CM/<: CPT | Mod: ,,, | Performed by: SURGERY

## 2019-04-12 PROCEDURE — 25000003 PHARM REV CODE 250: Performed by: SURGERY

## 2019-04-12 RX ORDER — FENTANYL CITRATE 50 UG/ML
INJECTION, SOLUTION INTRAMUSCULAR; INTRAVENOUS
Status: DISCONTINUED | OUTPATIENT
Start: 2019-04-12 | End: 2019-04-12

## 2019-04-12 RX ORDER — NEOSTIGMINE METHYLSULFATE 1 MG/ML
INJECTION, SOLUTION INTRAVENOUS
Status: DISCONTINUED | OUTPATIENT
Start: 2019-04-12 | End: 2019-04-12

## 2019-04-12 RX ORDER — ROCURONIUM BROMIDE 10 MG/ML
INJECTION, SOLUTION INTRAVENOUS
Status: DISCONTINUED | OUTPATIENT
Start: 2019-04-12 | End: 2019-04-12

## 2019-04-12 RX ORDER — IBUPROFEN 200 MG
16 TABLET ORAL
Status: DISCONTINUED | OUTPATIENT
Start: 2019-04-12 | End: 2019-04-17 | Stop reason: HOSPADM

## 2019-04-12 RX ORDER — CEFAZOLIN SODIUM 1 G/3ML
INJECTION, POWDER, FOR SOLUTION INTRAMUSCULAR; INTRAVENOUS
Status: DISCONTINUED | OUTPATIENT
Start: 2019-04-12 | End: 2019-04-12

## 2019-04-12 RX ORDER — ONDANSETRON 2 MG/ML
INJECTION INTRAMUSCULAR; INTRAVENOUS
Status: DISCONTINUED | OUTPATIENT
Start: 2019-04-12 | End: 2019-04-12

## 2019-04-12 RX ORDER — PHENYLEPHRINE HYDROCHLORIDE 10 MG/ML
INJECTION INTRAVENOUS
Status: DISCONTINUED | OUTPATIENT
Start: 2019-04-12 | End: 2019-04-12

## 2019-04-12 RX ORDER — INSULIN ASPART 100 [IU]/ML
0-5 INJECTION, SOLUTION INTRAVENOUS; SUBCUTANEOUS
Status: DISCONTINUED | OUTPATIENT
Start: 2019-04-12 | End: 2019-04-17 | Stop reason: HOSPADM

## 2019-04-12 RX ORDER — GLYCOPYRROLATE 0.2 MG/ML
INJECTION INTRAMUSCULAR; INTRAVENOUS
Status: DISCONTINUED | OUTPATIENT
Start: 2019-04-12 | End: 2019-04-12

## 2019-04-12 RX ORDER — HYDROMORPHONE HYDROCHLORIDE 1 MG/ML
0.5 INJECTION, SOLUTION INTRAMUSCULAR; INTRAVENOUS; SUBCUTANEOUS EVERY 6 HOURS PRN
Status: DISCONTINUED | OUTPATIENT
Start: 2019-04-12 | End: 2019-04-17 | Stop reason: HOSPADM

## 2019-04-12 RX ORDER — LIDOCAINE HCL/PF 100 MG/5ML
SYRINGE (ML) INTRAVENOUS
Status: DISCONTINUED | OUTPATIENT
Start: 2019-04-12 | End: 2019-04-12

## 2019-04-12 RX ORDER — PROPOFOL 10 MG/ML
VIAL (ML) INTRAVENOUS
Status: DISCONTINUED | OUTPATIENT
Start: 2019-04-12 | End: 2019-04-12

## 2019-04-12 RX ORDER — MIDAZOLAM HYDROCHLORIDE 1 MG/ML
INJECTION, SOLUTION INTRAMUSCULAR; INTRAVENOUS
Status: DISCONTINUED | OUTPATIENT
Start: 2019-04-12 | End: 2019-04-12

## 2019-04-12 RX ORDER — OXYCODONE HYDROCHLORIDE 5 MG/1
5 TABLET ORAL EVERY 4 HOURS PRN
Status: DISCONTINUED | OUTPATIENT
Start: 2019-04-12 | End: 2019-04-17 | Stop reason: HOSPADM

## 2019-04-12 RX ORDER — BACITRACIN 50000 [IU]/1
INJECTION, POWDER, FOR SOLUTION INTRAMUSCULAR
Status: DISCONTINUED | OUTPATIENT
Start: 2019-04-12 | End: 2019-04-12

## 2019-04-12 RX ORDER — SODIUM CHLORIDE 9 MG/ML
INJECTION, SOLUTION INTRAVENOUS CONTINUOUS PRN
Status: DISCONTINUED | OUTPATIENT
Start: 2019-04-12 | End: 2019-04-12

## 2019-04-12 RX ORDER — DEXTROSE MONOHYDRATE AND SODIUM CHLORIDE 5; .45 G/100ML; G/100ML
INJECTION, SOLUTION INTRAVENOUS CONTINUOUS
Status: DISCONTINUED | OUTPATIENT
Start: 2019-04-12 | End: 2019-04-13

## 2019-04-12 RX ORDER — SUCCINYLCHOLINE CHLORIDE 20 MG/ML
INJECTION INTRAMUSCULAR; INTRAVENOUS
Status: DISCONTINUED | OUTPATIENT
Start: 2019-04-12 | End: 2019-04-12

## 2019-04-12 RX ORDER — OXYCODONE HYDROCHLORIDE 10 MG/1
10 TABLET ORAL EVERY 4 HOURS PRN
Status: DISCONTINUED | OUTPATIENT
Start: 2019-04-12 | End: 2019-04-17 | Stop reason: HOSPADM

## 2019-04-12 RX ORDER — IBUPROFEN 200 MG
24 TABLET ORAL
Status: DISCONTINUED | OUTPATIENT
Start: 2019-04-12 | End: 2019-04-17 | Stop reason: HOSPADM

## 2019-04-12 RX ORDER — GLUCAGON 1 MG
1 KIT INJECTION
Status: DISCONTINUED | OUTPATIENT
Start: 2019-04-12 | End: 2019-04-17 | Stop reason: HOSPADM

## 2019-04-12 RX ADMIN — PIPERACILLIN AND TAZOBACTAM 4.5 G: 4; .5 INJECTION, POWDER, LYOPHILIZED, FOR SOLUTION INTRAVENOUS; PARENTERAL at 05:04

## 2019-04-12 RX ADMIN — PHENYLEPHRINE HYDROCHLORIDE 100 MCG: 10 INJECTION INTRAVENOUS at 04:04

## 2019-04-12 RX ADMIN — ASPIRIN 81 MG: 81 TABLET, COATED ORAL at 09:04

## 2019-04-12 RX ADMIN — PHENYLEPHRINE HYDROCHLORIDE 100 MCG: 10 INJECTION INTRAVENOUS at 05:04

## 2019-04-12 RX ADMIN — CILOSTAZOL 50 MG: 50 TABLET ORAL at 12:04

## 2019-04-12 RX ADMIN — VANCOMYCIN HYDROCHLORIDE 1000 MG: 1 INJECTION, POWDER, LYOPHILIZED, FOR SOLUTION INTRAVENOUS at 11:04

## 2019-04-12 RX ADMIN — ONDANSETRON 4 MG: 2 INJECTION INTRAMUSCULAR; INTRAVENOUS at 05:04

## 2019-04-12 RX ADMIN — ROCURONIUM BROMIDE 5 MG: 10 INJECTION, SOLUTION INTRAVENOUS at 04:04

## 2019-04-12 RX ADMIN — FENTANYL CITRATE 100 MCG: 50 INJECTION, SOLUTION INTRAMUSCULAR; INTRAVENOUS at 04:04

## 2019-04-12 RX ADMIN — PHENYLEPHRINE HYDROCHLORIDE 200 MCG: 10 INJECTION INTRAVENOUS at 05:04

## 2019-04-12 RX ADMIN — PROPOFOL 150 MG: 10 INJECTION, EMULSION INTRAVENOUS at 04:04

## 2019-04-12 RX ADMIN — AMLODIPINE BESYLATE 10 MG: 10 TABLET ORAL at 09:04

## 2019-04-12 RX ADMIN — GLYCOPYRROLATE 0.6 MG: 0.2 INJECTION, SOLUTION INTRAMUSCULAR; INTRAVENOUS at 05:04

## 2019-04-12 RX ADMIN — OXYCODONE HYDROCHLORIDE 10 MG: 10 TABLET ORAL at 09:04

## 2019-04-12 RX ADMIN — HYDROMORPHONE HYDROCHLORIDE 0.5 MG: 1 INJECTION, SOLUTION INTRAMUSCULAR; INTRAVENOUS; SUBCUTANEOUS at 07:04

## 2019-04-12 RX ADMIN — MIDAZOLAM HYDROCHLORIDE 2 MG: 1 INJECTION, SOLUTION INTRAMUSCULAR; INTRAVENOUS at 04:04

## 2019-04-12 RX ADMIN — LOSARTAN POTASSIUM AND HYDROCHLOROTHIAZIDE 1 TABLET: 25; 100 TABLET ORAL at 09:04

## 2019-04-12 RX ADMIN — INSULIN ASPART 1 UNITS: 100 INJECTION, SOLUTION INTRAVENOUS; SUBCUTANEOUS at 09:04

## 2019-04-12 RX ADMIN — NEOSTIGMINE METHYLSULFATE 5 MG: 1 INJECTION INTRAVENOUS at 05:04

## 2019-04-12 RX ADMIN — OXYCODONE HYDROCHLORIDE 10 MG: 10 TABLET ORAL at 06:04

## 2019-04-12 RX ADMIN — SUCCINYLCHOLINE CHLORIDE 120 MG: 20 INJECTION, SOLUTION INTRAMUSCULAR; INTRAVENOUS at 04:04

## 2019-04-12 RX ADMIN — CEFAZOLIN 2 G: 330 INJECTION, POWDER, FOR SOLUTION INTRAMUSCULAR; INTRAVENOUS at 04:04

## 2019-04-12 RX ADMIN — CILOSTAZOL 50 MG: 50 TABLET ORAL at 09:04

## 2019-04-12 RX ADMIN — SODIUM CHLORIDE, SODIUM GLUCONATE, SODIUM ACETATE, POTASSIUM CHLORIDE, MAGNESIUM CHLORIDE, SODIUM PHOSPHATE, DIBASIC, AND POTASSIUM PHOSPHATE: .53; .5; .37; .037; .03; .012; .00082 INJECTION, SOLUTION INTRAVENOUS at 05:04

## 2019-04-12 RX ADMIN — OXYCODONE HYDROCHLORIDE 10 MG: 10 TABLET ORAL at 11:04

## 2019-04-12 RX ADMIN — SODIUM CHLORIDE: 0.9 INJECTION, SOLUTION INTRAVENOUS at 04:04

## 2019-04-12 RX ADMIN — PIPERACILLIN AND TAZOBACTAM 4.5 G: 4; .5 INJECTION, POWDER, LYOPHILIZED, FOR SOLUTION INTRAVENOUS; PARENTERAL at 12:04

## 2019-04-12 RX ADMIN — LIDOCAINE HYDROCHLORIDE 75 MG: 20 INJECTION, SOLUTION INTRAVENOUS at 04:04

## 2019-04-12 RX ADMIN — DEXTROSE AND SODIUM CHLORIDE: 5; .45 INJECTION, SOLUTION INTRAVENOUS at 05:04

## 2019-04-12 RX ADMIN — ROCURONIUM BROMIDE 30 MG: 10 INJECTION, SOLUTION INTRAVENOUS at 05:04

## 2019-04-12 RX ADMIN — PIPERACILLIN AND TAZOBACTAM 4.5 G: 4; .5 INJECTION, POWDER, LYOPHILIZED, FOR SOLUTION INTRAVENOUS; PARENTERAL at 09:04

## 2019-04-12 NOTE — ANESTHESIA POSTPROCEDURE EVALUATION
Anesthesia Post Evaluation    Patient: Nelsy Marino    Procedure(s) Performed: Procedure(s) (LRB):  Incision and Drainage - R groin washout, possible muscle flap (Right)    Final Anesthesia Type: general  Patient location during evaluation: PACU  Patient participation: Yes- Able to Participate  Level of consciousness: awake and alert  Post-procedure vital signs: reviewed and stable  Pain management: adequate  Airway patency: patent  PONV status at discharge: No PONV  Anesthetic complications: no      Cardiovascular status: hemodynamically stable  Respiratory status: unassisted, spontaneous ventilation and room air  Hydration status: euvolemic  Follow-up not needed.          Vitals Value Taken Time   /69 4/12/2019  6:16 PM   Temp 36.8 °C (98.3 °F) 4/12/2019  6:06 PM   Pulse 92 4/12/2019  6:21 PM   Resp 23 4/12/2019  6:21 PM   SpO2 91 % 4/12/2019  6:21 PM   Vitals shown include unvalidated device data.      Event Time     Out of Recovery 04/12/2019 18:51:45          Pain/Ana Maria Score: Pain Rating Prior to Med Admin: 7 (4/12/2019  6:10 PM)  Ana Maria Score: 10 (4/12/2019  6:06 PM)

## 2019-04-12 NOTE — NURSING TRANSFER
Nursing Transfer Note      4/12/2019     Transfer To: 1043    Transfer via stretcher    Transfer with room air    Transported by pct    Medicines sent: none    Chart send with patient: Yes    Notified: friend    Patient reassessed at: 4/12/19 see flowsheets

## 2019-04-12 NOTE — ED NOTES
LOC: Patient name and date of birth verified.  The patient is awake, alert and aware of environment with an appropriate affect, the patient is oriented x 3 and speaking appropriately.  Pt in NAD.    APPEARANCE: Patient resting comfortably and in no acute distress, patient is clean and well groomed, patient's clothing is properly fastened.  SKIN: The skin is warm and dry, color consistent with ethnicity, patient has normal skin turgor and moist mucus membranes, skin intact, pt presents with cc infectious surgical incision s/t femoral/illiac bypass sx   MUSCULOSKELETAL: Patient moving all extremities well, no obvious swelling or deformities noted.  RESPIRATORY: Airway is open and patent, respirations are spontaneous, patient has a normal effort and rate, no accessory muscle use noted.  CARDIAC: Patient has a normal rate and rhythm, no periphreal edema noted, capillary refill < 3 seconds.  ABDOMEN: Soft and non tender to palpation, no distention noted. Bowel sounds present in all four quadrants.  NEUROLOGIC: Eyes open spontaneously, behavior appropriate to situation, follows commands, facial expression symmetrical, bilateral hand grasp equal and even, purposeful motor response noted, normal sensation in all extremities when touched with a finger.

## 2019-04-12 NOTE — ED PROVIDER NOTES
Encounter Date: 2019       History     Chief Complaint   Patient presents with    Post-op Problem     Pt s/p femoral/iliac bypass surgery on 3/20.  States had CT today which showed infection to site.      78-year-old female referred to the emergency department for evaluation of wound dehiscence and abnormal CT scan.  Patient is followed by vascular surgery she is status post fem iliac bypass on March.  She has had development of wound dehiscence.  CT scan at clinic revealed cellulitis with possible abscess fluid collection.  Patient complains of pain.  She has been using Percocet at home with improvement.  She denies any fevers chills chest pain nausea or vomiting.  She is not currently on antibiotics.  Upon arrival to the ER she appears well and in no acute distress.        Review of patient's allergies indicates:   Allergen Reactions    Motrin [ibuprofen] Itching     Past Medical History:   Diagnosis Date    CHF (congestive heart failure)     Diabetes mellitus     DVT (deep venous thrombosis)     Hypertension     Miscarriage      Past Surgical History:   Procedure Laterality Date     femoral vascular surgery       CREATION, BYPASS, ARTERIAL, ILIAC TO FEMORAL, RIGHT LOWER EXTREMITY, RIGHT PROFUNDAPLASTY Right 3/20/2019    [facility] by [facility] at [facility]    HYSTERECTOMY      ?unsure cervix present     Family History   Problem Relation Age of Onset    Diabetes Father     Hypertension Father      Social History     Tobacco Use    Smoking status: Former Smoker     Last attempt to quit: 1980     Years since quittin.3    Smokeless tobacco: Never Used   Substance Use Topics    Alcohol use: No    Drug use: No     Review of Systems   Constitutional: Negative for fever.   HENT: Negative for sore throat.    Respiratory: Negative for shortness of breath.    Cardiovascular: Negative for chest pain.   Gastrointestinal: Negative for nausea.   Genitourinary: Negative for dysuria.    Musculoskeletal: Negative for back pain.   Skin: Positive for color change and wound. Negative for rash.   Neurological: Negative for weakness.   Hematological: Does not bruise/bleed easily.       Physical Exam     Initial Vitals [04/11/19 1744]   BP Pulse Resp Temp SpO2   (!) 142/58 100 18 98.2 °F (36.8 °C) 96 %      MAP       --         Physical Exam    Constitutional: Vital signs are normal. She appears well-developed and well-nourished.   HENT:   Head: Normocephalic and atraumatic.   Eyes: Conjunctivae are normal.   Cardiovascular: Normal rate and regular rhythm.   Abdominal: Soft. Normal appearance and bowel sounds are normal.   Musculoskeletal: Normal range of motion.   Neurological: She is alert and oriented to person, place, and time.   Skin: Skin is warm and intact.   Abdominal incision site appears to be healing well  Right groin incision site is dehisced with purulent drainage and malodor there is erythematous skin changes around the wound dehiscence in the area is tender to touch, the area is not warm   Psychiatric: She has a normal mood and affect. Her speech is normal and behavior is normal. Cognition and memory are normal.         ED Course   Procedures  Labs Reviewed   CBC W/ AUTO DIFFERENTIAL - Abnormal; Notable for the following components:       Result Value    Hemoglobin 10.1 (*)     Hematocrit 32.9 (*)     MCH 25.1 (*)     MCHC 30.7 (*)     RDW 15.0 (*)     Platelets 373 (*)     Gran # (ANC) 8.3 (*)     Gran% 79.0 (*)     Lymph% 12.8 (*)     All other components within normal limits   CULTURE, BLOOD   CULTURE, BLOOD   CULTURE, AEROBIC  (SPECIFY SOURCE)   PROTIME-INR   COMPREHENSIVE METABOLIC PANEL   MAGNESIUM   PHOSPHORUS   URINALYSIS, REFLEX TO URINE CULTURE   TYPE & SCREEN          Imaging Results    None          Medical Decision Making:   History:   Old Medical Records: I decided to obtain old medical records.  Initial Assessment:   70-year-old female with wound dehiscence and fluid  collection on CT scan  Differential Diagnosis:   Cellulitis, seroma, abscess, wound dehiscence  Clinical Tests:   Lab Tests: Ordered and Reviewed  Radiological Study: Reviewed  ED Management:  70-year-old female with postoperative wound dehiscence and cellulitic changes on CT scan  I will get the patient started on antibiotics, routine labs and cultures  Wound culture taken in the ED  Case discussed with vascular surgery who will come see the patient in the ER  Vascular surgery will admit the patient  Other:   I have discussed this case with another health care provider.                      Clinical Impression:       ICD-10-CM ICD-9-CM   1. Wound dehiscence T81.30XA 998.30   2. Cellulitis, unspecified cellulitis site L03.90 682.9   3. Fluid collection at surgical site, initial encounter T88.8XXA 998.13         Disposition:   Disposition: Admitted  Condition: Stable                        Kenny Dolan PA-C  04/11/19 0228

## 2019-04-12 NOTE — PROGRESS NOTES
Updated patient on POC. Patient voices understanding. Call bell in reach, instructed patient ot call for needs.

## 2019-04-12 NOTE — ANESTHESIA PREPROCEDURE EVALUATION
[facility]  Anesthesia Pre-Operative Evaluation         Patient Name: Nelsy Marino  YOB: 1940  MRN: [id number]    SUBJECTIVE:     04/12/2019    Pre-operative evaluation for Procedure(s) (LRB):  Incision and Drainage - R groin washout, possible muscle flap (Right)    Nelsy Marino is a 78 y.o. female with HTN, DM type 2, HFpEF, and PAD (s/p R iliofemoral bypass 3/20/19) who is admitted to Vascular Surgery service with wound dehiscence.    Patient now presents for the above procedure(s).      LDA:      18G Peripheral IV - Single Lumen 04/11/19 2030 18 G Left Antecubital (Active)   Number of days: 0           Previous airway:   DL, grade 1 view, Mendez #2; 1 attempt; easy mask with oral airway      Drips:   None documented.      Patient Active Problem List   Diagnosis    Lichen sclerosus et atrophicus    PVD (peripheral vascular disease)    Essential hypertension    Type 2 diabetes mellitus    Diastolic CHF, chronic    Obesity (BMI 30-39.9)    History of DVT (deep vein thrombosis)    Wound dehiscence       Review of patient's allergies indicates:   Allergen Reactions    Motrin [ibuprofen] Itching       Current Inpatient Medications:   amLODIPine  10 mg Oral Daily    aspirin  81 mg Oral Daily    cilostazol  50 mg Oral BID    losartan-hydrochlorothiazide 100-25 mg  1 tablet Oral Daily    piperacillin-tazobactam (ZOSYN) IVPB  4.5 g Intravenous Q8H    vancomycin (VANCOCIN) IVPB  1,000 mg Intravenous Q24H       No current facility-administered medications on file prior to encounter.      Current Outpatient Medications on File Prior to Encounter   Medication Sig Dispense Refill    amlodipine (NORVASC) 10 MG tablet Take 10 mg by mouth once daily.      aspirin (ECOTRIN) 81 MG EC tablet Take 81 mg by mouth once daily.      cilostazol (PLETAL) 50 MG Tab Take 1 tablet (50 mg total) by mouth 2 (two) times daily. If patient tolerate medication after 4 weeks, increase dose to  "100 mg twice daily. 60 tablet 11    glipiZIDE (GLUCOTROL) 10 MG tablet Take 10 mg by mouth 2 (two) times daily before meals.      insulin aspart protamine-insulin aspart (NOVOLOG 70/30) 100 unit/mL (70-30) InPn pen Inject 45 Units into the skin every morning.       insulin aspart protamine-insulin aspart (NOVOLOG MIX 70-30FLEXPEN U-100) 100 unit/mL (70-30) InPn pen Inject 40 Units into the skin every evening.      losartan-hydrochlorothiazide 100-25 mg (HYZAAR) 100-25 mg per tablet Take 1 tablet by mouth once daily.      oxyCODONE-acetaminophen (PERCOCET) 5-325 mg per tablet Take 1 tablet by mouth every 6 (six) hours as needed. 30 tablet 0    BLOOD SUGAR DIAGNOSTIC (TRUE METRIX GLUCOSE TEST STRIP MISC) by Misc.(Non-Drug; Combo Route) route.      insulin syringe-needle,dispos. 0.3 mL 30 gauge x 5/16" Syrg by Misc.(Non-Drug; Combo Route) route.      metformin (GLUCOPHAGE) 1000 MG tablet Take 1,000 mg by mouth 2 (two) times daily with meals.      oxyCODONE-acetaminophen (PERCOCET) 5-325 mg per tablet Take 1 tablet by mouth every 6 (six) hours as needed for Pain. 15 tablet 0       Past Surgical History:   Procedure Laterality Date     femoral vascular surgery       CREATION, BYPASS, ARTERIAL, ILIAC TO FEMORAL, RIGHT LOWER EXTREMITY, RIGHT PROFUNDAPLASTY Right 3/20/2019    [facility]    HYSTERECTOMY      ?unsure cervix present       Social History     Socioeconomic History    Marital status:      Spouse name: Not on file    Number of children: Not on file    Years of education: Not on file    Highest education level: Not on file   Occupational History    Not on file   Social Needs    Financial resource strain: Not on file    Food insecurity:     Worry: Not on file     Inability: Not on file    Transportation needs:     Medical: Not on file     Non-medical: Not on file   Tobacco Use    Smoking status: Former Smoker     Last attempt to quit: 1980     Years " since quittin.3    Smokeless tobacco: Never Used   Substance and Sexual Activity    Alcohol use: No    Drug use: No    Sexual activity: Never   Lifestyle    Physical activity:     Days per week: Not on file     Minutes per session: Not on file    Stress: Not on file   Relationships    Social connections:     Talks on phone: Not on file     Gets together: Not on file     Attends [employer] service: Not on file     Active member of club or organization: Not on file     Attends meetings of clubs or organizations: Not on file     Relationship status: Not on file   Other Topics Concern    Not on file   Social History Narrative    Not on file       OBJECTIVE:     Vital Signs Range (Last 24H):  Temp:  [36.3 °C (97.4 °F)-36.8 °C (98.2 °F)]   Pulse:  []   Resp:  [16-20]   BP: (122-148)/(57-67)   SpO2:  [94 %-98 %]       Significant Labs:  Lab Results   Component Value Date    WBC 10.47 2019    HGB 10.1 (L) 2019    HCT 32.9 (L) 2019     (H) 2019    ALT 8 (L) 2019    AST 16 2019     (L) 2019    K 5.0 2019     2019    CREATININE 1.4 2019    BUN 24 (H) 2019    CO2 25 2019    INR 1.1 2019    HGBA1C 9.7 (H) 2019         Diagnostic Studies:  No relevant studies.      Cardiac Studies:  EKG (3/21/2019):   Vent. Rate : 101 BPM     Atrial Rate : 101 BPM     P-R Int : 170 ms          QRS Dur : 076 ms      QT Int : 346 ms       P-R-T Axes : 060 044 058 degrees     QTc Int : 448 ms  Sinus tachycardia  Otherwise normal ECG  No previous ECGs available  Confirmed by René Chirinos MD ([id number]) on 3/21/2019 8:28:36 PM    2D Echo:  No results found for this or any previous visit.      ASSESSMENT/PLAN:     Anesthesia Evaluation    I have reviewed the Patient Summary Reports.    I have reviewed the Nursing Notes.   I have reviewed the Medications.     Review of Systems  Anesthesia Hx:  No problems with previous Anesthesia     Social:  Former Smoker    Cardiovascular:   Denies Pacemaker. Hypertension  CHF PVD    Pulmonary:  Pulmonary Normal    Renal/:  Renal/ Normal     Hepatic/GI:  Hepatic/GI Normal    Neurological:  Neurology Normal    Endocrine:   Diabetes, type 2, using insulin        Physical Exam  General:  Well nourished, Obesity    Airway/Jaw/Neck:  Airway Findings: Mouth Opening: Small, but > 3cm Tongue: Large  Mallampati: IV  Improves to III with phonation.  TM Distance: Normal, at least 6 cm         Dental:  Dental Findings: Upper Dentures, Lower Dentures   Chest/Lungs:  Chest/Lungs Findings: Normal Respiratory Rate     Heart/Vascular:  Heart Findings: Rate: Normal  Rhythm: Regular Rhythm        Mental Status:  Mental Status Findings:  Cooperative, Alert and Oriented         Anesthesia Plan  Type of Anesthesia, risks & benefits discussed:  Anesthesia Type:  general  Patient's Preference:   Intra-op Monitoring Plan: standard ASA monitors  Intra-op Monitoring Plan Comments:   Post Op Pain Control Plan: multimodal analgesia, IV/PO Opioids PRN and per primary service following discharge from PACU  Post Op Pain Control Plan Comments:   Induction:   IV  Beta Blocker:  Patient is not currently on a Beta-Blocker (No further documentation required).       Informed Consent: Patient understands risks and agrees with Anesthesia plan.  Questions answered. Anesthesia consent signed with patient.  ASA Score: 3     Day of Surgery Review of History & Physical:    H&P update referred to the surgeon.         Ready For Surgery From Anesthesia Perspective.

## 2019-04-12 NOTE — PROGRESS NOTES
Patient placed on bedpan to urinate. Urine noted on bed sheets. Stretcher cleaned with sani wipes and clean bed sheets applied. Clean, warm blankets provided for patient comfort.

## 2019-04-12 NOTE — PLAN OF CARE
Problem: Adult Inpatient Plan of Care  Goal: Plan of Care Review  Plan of care reviewed with pt/spouse both verbalized understanding, vss, patient ambulatory with assistance/use walker, denies any c/o of pain @ this time, antibiotics infusing no side effects noted, spouse @bed-side, call-light within reach, will continue to monitor.

## 2019-04-12 NOTE — PROGRESS NOTES
Updated patient on POC, patient voices understanding. Spouse at bedside. Call bell in reach. Patient instructed to call for needs.

## 2019-04-12 NOTE — PLAN OF CARE
Mrs ARNOLD Marino was admitted to [facility] for wound dehiscense.     CM met with patient and  at the bedside to discuss current POC. Patient is AAO x's 3 and able to answer questions appropriately. Patient states that she lives in a 1 story home with her  and 2 grandchildren. Patient states that there are steps to maneuver however the family has had a ramp installed to the home for safe access. Patient states that she is active with [facility] and would like to continue services with them once medically ready for discharge. Patient has a rolling walker and bedside commode at home that she uses as needed. CM name and number written on white board in patient's room, informed pt to call for any questions or concerns. CM will continue to follow for needs.     Dr FAUZIA Hillman  [facility]  [address] / [place] [address]     [facility] - [place] ([place], [place] - [address]  [address]  [place] [facility] [place] [address]  Phone: [phone] Fax: [phone]    [facility] - [facility] [address] [facility]  [address] [facility] [address]  Phone: [phone] Fax: [phone]    Payor: [employer] MEDICARE / Plan: HU[employer] MEDICARE HMO / Product Type: Capitation /     Extended Emergency Contact Information  Primary Emergency Contact: NedDimas   [place]  Mobile Phone: [phone]  Relation: Spouse   needed? Yes  Secondary Emergency Contact: Mercedes Marino  Address: [address] [place] [address], [place] [address] [facility]  Home Phone: [phone]  Relation: Sister   needed? Yes    Future Appointments   Date Time Provider Department Center   4/18/2019  3:00 PM Danny Dunbar MD [facility] VAS [facility]   4/24/2019  9:00 AM VASCULAR ULTRASOUND, [facility] [place] [facility]   4/24/2019 10:00 AM [facility] CT1 LIMIT 400 LBS [facility] CT SCAN [facility]   4/24/2019 10:30 AM VASCULAR ULTRASOUND,  [facility] EKG [facility]   4/25/2019 10:40 AM Danny Dunbar MD [facility] VAS J[facility]        04/12/19 1138   Discharge Assessment   Assessment Type Discharge Planning Assessment   Confirmed/corrected address and phone number on facesheet? Yes   Assessment information obtained from? Patient;Caregiver   Expected Length of Stay (days) 3   Communicated expected length of stay with patient/caregiver yes   Prior to hospitilization cognitive status: Alert/Oriented   Prior to hospitalization functional status: Assistive Equipment;Needs Assistance   Current cognitive status: Alert/Oriented   Current Functional Status: Assistive Equipment;Needs Assistance   Lives With spouse;grandchild(elisa)   Able to Return to Prior Arrangements yes   Is patient able to care for self after discharge? Unable to determine at this time (comments)   Who are your caregiver(s) and their phone number(s)? Dimas-spouse    Patient's perception of discharge disposition home health   Readmission Within the Last 30 Days other (see comments)  (Muscle flap surgery )   Patient currently receives any other outside agency services? Yes   Name and contact number of agency or person providing outside services [facility] [phone]   Is it the patient/care giver preference to resume care with the current outside agency? Yes   Equipment Currently Used at Home walker, rolling;commode   Do you have any problems affording any of your prescribed medications? No   Is the patient taking medications as prescribed? yes   Does the patient have transportation home? Yes   Transportation Anticipated family or friend will provide   Discharge Plan A Home Health   DME Needed Upon Discharge  other (see comments)  (TBD )   Patient/Family in Agreement with Plan yes     Dorothea Hurtado RN [facility]   Case Management     [phone]

## 2019-04-12 NOTE — TRANSFER OF CARE
"Anesthesia Transfer of Care Note    Patient: Nelsy Marino    Procedure(s) Performed: Procedure(s) (LRB):  Incision and Drainage - R groin washout, possible muscle flap (Right)    Patient location: PACU    Anesthesia Type: general    Transport from OR: Transported from OR on 6-10 L/min O2 by face mask with adequate spontaneous ventilation    Post pain: adequate analgesia    Post assessment: no apparent anesthetic complications and tolerated procedure well    Post vital signs: stable    Level of consciousness: awake, alert and oriented    Nausea/Vomiting: no nausea/vomiting    Complications: none    Transfer of care protocol was followed      Last vitals:   Visit Vitals  /83 (BP Location: Right arm, Patient Position: Lying)   Pulse 96   Temp 37.3 °C (99.2 °F) (Oral)   Resp 18   Ht 5' 2" (1.575 m)   Wt 76 kg (167 lb 8 oz)   LMP  (LMP Unknown)   SpO2 100%   Breastfeeding? No   BMI 30.64 kg/m²     "

## 2019-04-12 NOTE — H&P
HPI:  Nelsy Marino is a 78 y.o. female with       Patient Active Problem List   Diagnosis    Lichen sclerosus et atrophicus    PVD (peripheral vascular disease)    Essential hypertension    Type 2 diabetes mellitus    Diastolic CHF, chronic    Obesity (BMI 30-39.9)    History of DVT (deep vein thrombosis)    being managed by PCP and specialists who is here today for evaluation of RLE pain.  She is s/p fem-fem approx 20 yrs ago without issues until recently.  Fem-fem that was performed for RLE ALI was noted to be occluded on US at [facility].  Was scheduled for an angiogram at [facility] although was unable to proceed due to insurance purposes.  Patient states location is R calf and thigh occurring for 2 mo.  Associated signs and symptoms include none.  Quality is throbbing and severity is 9/10 at worst.  Symptoms began 2 mo ago.  Alleviating factors include rest.  Worsening factors include ambulation.  Pt states RLE claudication x 1 block.  States it is lifestyle limiting.      no MI  no Stroke  Tobacco use: quit 35 yrs ago     1/14/19: cont to c/o RLE pain, 1/2 block calf claudication.  Intermittent rest pain, no tissue loss.  Compliant with Pletal, has not noticed significant improvement.     2/14/19: Cont to experience 1/2 block R calf claudication.  No wounds.       4/8/19: s/p R iliofemoral bypass 3/20/19.  C/o R groin inc dehiscence by LUCIUS RN.  No F/C.  States RLE ischemic rest pain resolved, +ambulating better without issues.     Now with wound dehiscence. CT shows fluid collection in R groin.           Past Medical History:   Diagnosis Date    CHF (congestive heart failure)      Diabetes mellitus      DVT (deep venous thrombosis)      Hypertension      Miscarriage              Past Surgical History:   Procedure Laterality Date     femoral vascular surgery         CREATION, BYPASS, ARTERIAL, ILIAC TO FEMORAL, RIGHT LOWER EXTREMITY, RIGHT PROFUNDAPLASTY Right 3/20/2019     Performed by Danny CAGE  MD [place] at [facility]    HYSTERECTOMY         ?unsure cervix present            Family History   Problem Relation Age of Onset    Diabetes Father      Hypertension Father        Social History               Socioeconomic History    Marital status:        Spouse name: Not on file    Number of children: Not on file    Years of education: Not on file    Highest education level: Not on file   Occupational History    Not on file   Social Needs    Financial resource strain: Not on file    Food insecurity:       Worry: Not on file       Inability: Not on file    Transportation needs:       Medical: Not on file       Non-medical: Not on file   Tobacco Use    Smoking status: Former Smoker       Last attempt to quit:        Years since quittin.3    Smokeless tobacco: Never Used   Substance and Sexual Activity    Alcohol use: No    Drug use: No    Sexual activity: Never   Lifestyle    Physical activity:       Days per week: Not on file       Minutes per session: Not on file    Stress: Not on file   Relationships    Social connections:       Talks on phone: Not on file       Gets together: Not on file       Attends [employer] service: Not on file       Active member of club or organization: Not on file       Attends meetings of clubs or organizations: Not on file       Relationship status: Not on file   Other Topics Concern    Not on file   Social History Narrative    Not on file            Current Outpatient Medications:     amlodipine (NORVASC) 10 MG tablet, Take 10 mg by mouth once daily., Disp: , Rfl:     aspirin (ECOTRIN) 81 MG EC tablet, Take 81 mg by mouth once daily., Disp: , Rfl:     BLOOD SUGAR DIAGNOSTIC (TRUE METRIX GLUCOSE TEST STRIP MISC), by Misc.(Non-Drug; Combo Route) route., Disp: , Rfl:     cilostazol (PLETAL) 50 MG Tab, Take 1 tablet (50 mg total) by mouth 2 (two) times daily. If patient tolerate medication after 4 weeks, increase dose to 100 mg twice daily., Disp:  "60 tablet, Rfl: 11    glipiZIDE (GLUCOTROL) 10 MG tablet, Take 10 mg by mouth 2 (two) times daily before meals., Disp: , Rfl:     insulin aspart protamine-insulin aspart (NOVOLOG 70/30) 100 unit/mL (70-30) InPn pen, Inject 45 Units into the skin every morning. , Disp: , Rfl:     insulin aspart protamine-insulin aspart (NOVOLOG MIX 70-30FLEXPEN U-100) 100 unit/mL (70-30) InPn pen, Inject 40 Units into the skin every evening., Disp: , Rfl:     insulin syringe-needle,dispos. 0.3 mL 30 gauge x 5/16" Syrg, by Misc.(Non-Drug; Combo Route) route., Disp: , Rfl:     losartan-hydrochlorothiazide 100-25 mg (HYZAAR) 100-25 mg per tablet, Take 1 tablet by mouth once daily., Disp: , Rfl:     metformin (GLUCOPHAGE) 1000 MG tablet, Take 1,000 mg by mouth 2 (two) times daily with meals., Disp: , Rfl:     oxyCODONE-acetaminophen (PERCOCET) 5-325 mg per tablet, Take 1 tablet by mouth every 6 (six) hours as needed., Disp: 30 tablet, Rfl: 0    oxyCODONE-acetaminophen (PERCOCET) 5-325 mg per tablet, Take 1 tablet by mouth every 6 (six) hours as needed for Pain., Disp: 15 tablet, Rfl: 0     REVIEW OF SYSTEMS:  General: No fevers or chills; ENT: No sore throat; Allergy and Immunology: no persistent infections; Hematological and Lymphatic: No history of bleeding or easy bruising; Endocrine: negative; Respiratory: no cough, shortness of breath, or wheezing; Cardiovascular: no chest pain or dyspnea on exertion; Gastrointestinal: no abdominal pain/back, change in bowel habits, or bloody stools; Genito-Urinary: no dysuria, trouble voiding, or hematuria; Musculoskeletal: negative; Neurological: no TIA or stroke symptoms; Psychiatric: no nervousness, anxiety or depression.     PHYSICAL EXAM:   Right Arm BP - Sittin/60 (19 1306)  Left Arm BP - Sittin/60 (19 1306)  Pulse: 108       General appearance:          Alert, well-appearing, and in no distress.  Oriented to person, place, and time                    " Neurological:          Normal speech, no focal findings noted; CN II - XII grossly intact. RLE with sensation to light touch, LLE with sensation to light touch.            Musculoskeletal:          Digits/nail without cyanosis/clubbing.  Strength 5/5 BLE.                               Neck:         Supple, no significant adenopathy                             Chest:          Clear to auscultation, no wheezes, rales or rhonchi, symmetric air entry. No use of accessory muscles                          Cardiac:         Normal rate and regular rhythm, S1 and S2 normal                       Abdomen:         Soft, nontender, nondistended, no masses or organomegaly, no hernia                                      No rebound tenderness noted; bowel sounds normal                                      R groin inc with dehiscence superiorly of skin and to mid aspect, no purulence or graft exposure, abdominal inc healing well +staples without infection                 Extremities:            2+ R femoral pulse, 2+ L femoral pulse                                      doppler+ R popliteal pulse, doppler+ L popliteal pulse                                      doppler+ R PT pulse, doppler+ L PT pulse                                      2+ R DP pulse, doppler+ L DP pulse                                      no RLE edema, no LLE edema                          Skin:   RLE without tissue loss; LLE without tissue loss     LAB RESULTS:  No results found for: CBC        Lab Results   Component Value Date     LABPROT 10.7 03/23/2019     INR 1.0 03/23/2019            Lab Results   Component Value Date      03/23/2019     K 4.1 03/23/2019      03/23/2019     CO2 26 03/23/2019      (H) 03/23/2019     BUN 28 (H) 03/23/2019     CREATININE 1.3 03/23/2019     CALCIUM 9.3 03/23/2019     ANIONGAP 7 (L) 03/23/2019     EGFRNONAA 39 (A) 03/23/2019            Lab Results   Component Value Date     WBC 12.28 03/23/2019     RBC 3.35 (L)  03/23/2019     HGB 8.7 (L) 03/23/2019     HCT 27.4 (L) 03/23/2019     MCV 82 03/23/2019     MCH 26.0 (L) 03/23/2019     MCHC 31.8 (L) 03/23/2019     RDW 15.3 (H) 03/23/2019      03/23/2019     MPV 11.2 03/23/2019     GRAN 9.9 (H) 03/23/2019     GRAN 80.3 (H) 03/23/2019     LYMPH 1.3 03/23/2019     LYMPH 10.5 (L) 03/23/2019     MONO 0.9 03/23/2019     MONO 7.4 03/23/2019     EOS 0.2 03/23/2019     BASO 0.01 03/23/2019     EOSINOPHIL 1.7 03/23/2019     BASOPHIL 0.1 03/23/2019     DIFFMETHOD Automated 03/23/2019      .        Lab Results   Component Value Date     HGBA1C 9.7 (H) 03/20/2019         IMAGING:  All pertinent imaging has been reviewed and interpreted independently.     11/8/18: US showing occluded fem-fem, decreased velocity R CFA, HD significant stenosis R SFA     OSH ARCHIE: 0.4/1     1/14/19: ARCHIE R 0.4/1.1, flat L toe waveform, R toe pressure 64 mmHg     2/2019: Stress test without evidence of ischemia.  Echo with normal function.     IMP/PLAN:  78 y.o. female with   Patient Active Problem List   Diagnosis    Lichen sclerosus et atrophicus    PVD (peripheral vascular disease)    Essential hypertension    Type 2 diabetes mellitus    Diastolic CHF, chronic    Obesity (BMI 30-39.9)    History of DVT (deep vein thrombosis)    being managed by PCP and specialists who is here today for evaluation of RLE claudication, lifestyle limiting.     -Recovering well s/p R iliofemoral bypass with skin dehiscence of R groin inc at superior and mid aspect, no graft exposure or fascial dehiscence, 2+ DP with resolution of claudication and ischemic rest pain  - CT shows fluid collection adjacent to graft, with question of rim enhancement concerning for possible abscess  -recommend wet to dry dressing BID with normal saline to areas of dehiscence only keeping surrounding skin clean and dry; warm soapy water to groin inc daily; recommend daily HH RN visits  -Cont ASA, statin and Pletal  -Heart healthy  lifestyle  -Cont smoking cessation    - plan for operative washout on 4/12/19 with possible muscle flap    Jayde Rodriguez MD   Fellow, Vascular/Endovascular Surgery

## 2019-04-12 NOTE — BRIEF OP NOTE
[facility]  Brief Operative Note    SUMMARY     Surgery Date: 4/12/2019     Surgeon(s) and Role:     * Danny Dunbar MD - Primary     * Jayde Rodriguez MD - Fellow    Assisting Surgeon: None    Pre-op Diagnosis:  Wound dehiscence [T81.30XA]    Post-op Diagnosis:  Post-Op Diagnosis Codes:     * Wound dehiscence [T81.30XA]    Procedure:   1. Right groin wound washout, wound #1 4x3 cm, wound #2 5x4 cm  2. Right groin wound debridement, wound #1 4x3 cm, wound #2 5x4 cm    Anesthesia: General    Description of Procedure: serous drainage from wound during evacuation of fluid collection    Description of the findings of the procedure: cultures taken, wound vacuum placed    Estimated Blood Loss: <5cc         Specimens:   Specimen (12h ago, onward)    None

## 2019-04-13 LAB
ANION GAP SERPL CALC-SCNC: 8 MMOL/L (ref 8–16)
BASOPHILS # BLD AUTO: 0.03 K/UL (ref 0–0.2)
BASOPHILS NFR BLD: 0.4 % (ref 0–1.9)
BUN SERPL-MCNC: 20 MG/DL (ref 8–23)
CALCIUM SERPL-MCNC: 9 MG/DL (ref 8.7–10.5)
CHLORIDE SERPL-SCNC: 104 MMOL/L (ref 95–110)
CO2 SERPL-SCNC: 24 MMOL/L (ref 23–29)
CREAT SERPL-MCNC: 1.4 MG/DL (ref 0.5–1.4)
DIFFERENTIAL METHOD: ABNORMAL
EOSINOPHIL # BLD AUTO: 0.4 K/UL (ref 0–0.5)
EOSINOPHIL NFR BLD: 5.2 % (ref 0–8)
ERYTHROCYTE [DISTWIDTH] IN BLOOD BY AUTOMATED COUNT: 14.6 % (ref 11.5–14.5)
EST. GFR  (AFRICAN AMERICAN): 41.5 ML/MIN/1.73 M^2
EST. GFR  (NON AFRICAN AMERICAN): 36 ML/MIN/1.73 M^2
GLUCOSE SERPL-MCNC: 196 MG/DL (ref 70–110)
HCT VFR BLD AUTO: 29.1 % (ref 37–48.5)
HGB BLD-MCNC: 8.9 G/DL (ref 12–16)
IMM GRANULOCYTES # BLD AUTO: 0.02 K/UL (ref 0–0.04)
IMM GRANULOCYTES NFR BLD AUTO: 0.3 % (ref 0–0.5)
LYMPHOCYTES # BLD AUTO: 1.3 K/UL (ref 1–4.8)
LYMPHOCYTES NFR BLD: 17 % (ref 18–48)
MAGNESIUM SERPL-MCNC: 1.9 MG/DL (ref 1.6–2.6)
MCH RBC QN AUTO: 24.6 PG (ref 27–31)
MCHC RBC AUTO-ENTMCNC: 30.6 G/DL (ref 32–36)
MCV RBC AUTO: 80 FL (ref 82–98)
MONOCYTES # BLD AUTO: 0.5 K/UL (ref 0.3–1)
MONOCYTES NFR BLD: 7.2 % (ref 4–15)
NEUTROPHILS # BLD AUTO: 5.2 K/UL (ref 1.8–7.7)
NEUTROPHILS NFR BLD: 69.9 % (ref 38–73)
NRBC BLD-RTO: 0 /100 WBC
PHOSPHATE SERPL-MCNC: 4.2 MG/DL (ref 2.7–4.5)
PLATELET # BLD AUTO: 307 K/UL (ref 150–350)
PMV BLD AUTO: 10.4 FL (ref 9.2–12.9)
POCT GLUCOSE: 202 MG/DL (ref 70–110)
POCT GLUCOSE: 215 MG/DL (ref 70–110)
POCT GLUCOSE: 298 MG/DL (ref 70–110)
POCT GLUCOSE: 311 MG/DL (ref 70–110)
POTASSIUM SERPL-SCNC: 4.6 MMOL/L (ref 3.5–5.1)
RBC # BLD AUTO: 3.62 M/UL (ref 4–5.4)
SODIUM SERPL-SCNC: 136 MMOL/L (ref 136–145)
WBC # BLD AUTO: 7.47 K/UL (ref 3.9–12.7)

## 2019-04-13 PROCEDURE — 63600175 PHARM REV CODE 636 W HCPCS: Performed by: STUDENT IN AN ORGANIZED HEALTH CARE EDUCATION/TRAINING PROGRAM

## 2019-04-13 PROCEDURE — 83735 ASSAY OF MAGNESIUM: CPT

## 2019-04-13 PROCEDURE — 85025 COMPLETE CBC W/AUTO DIFF WBC: CPT

## 2019-04-13 PROCEDURE — 94761 N-INVAS EAR/PLS OXIMETRY MLT: CPT

## 2019-04-13 PROCEDURE — 20600001 HC STEP DOWN PRIVATE ROOM

## 2019-04-13 PROCEDURE — 84100 ASSAY OF PHOSPHORUS: CPT

## 2019-04-13 PROCEDURE — 36415 COLL VENOUS BLD VENIPUNCTURE: CPT

## 2019-04-13 PROCEDURE — 25000003 PHARM REV CODE 250: Performed by: STUDENT IN AN ORGANIZED HEALTH CARE EDUCATION/TRAINING PROGRAM

## 2019-04-13 PROCEDURE — 80048 BASIC METABOLIC PNL TOTAL CA: CPT

## 2019-04-13 PROCEDURE — 25000003 PHARM REV CODE 250: Performed by: SURGERY

## 2019-04-13 RX ORDER — PANTOPRAZOLE SODIUM 40 MG/1
40 TABLET, DELAYED RELEASE ORAL
Status: DISCONTINUED | OUTPATIENT
Start: 2019-04-13 | End: 2019-04-17 | Stop reason: HOSPADM

## 2019-04-13 RX ADMIN — HYDROMORPHONE HYDROCHLORIDE 0.5 MG: 1 INJECTION, SOLUTION INTRAMUSCULAR; INTRAVENOUS; SUBCUTANEOUS at 12:04

## 2019-04-13 RX ADMIN — PIPERACILLIN AND TAZOBACTAM 4.5 G: 4; .5 INJECTION, POWDER, LYOPHILIZED, FOR SOLUTION INTRAVENOUS; PARENTERAL at 06:04

## 2019-04-13 RX ADMIN — LOSARTAN POTASSIUM AND HYDROCHLOROTHIAZIDE 1 TABLET: 25; 100 TABLET ORAL at 09:04

## 2019-04-13 RX ADMIN — INSULIN ASPART 2 UNITS: 100 INJECTION, SOLUTION INTRAVENOUS; SUBCUTANEOUS at 08:04

## 2019-04-13 RX ADMIN — OXYCODONE HYDROCHLORIDE 10 MG: 10 TABLET ORAL at 05:04

## 2019-04-13 RX ADMIN — AMLODIPINE BESYLATE 10 MG: 10 TABLET ORAL at 09:04

## 2019-04-13 RX ADMIN — HYDROMORPHONE HYDROCHLORIDE 0.5 MG: 1 INJECTION, SOLUTION INTRAMUSCULAR; INTRAVENOUS; SUBCUTANEOUS at 04:04

## 2019-04-13 RX ADMIN — INSULIN ASPART 3 UNITS: 100 INJECTION, SOLUTION INTRAVENOUS; SUBCUTANEOUS at 12:04

## 2019-04-13 RX ADMIN — PIPERACILLIN AND TAZOBACTAM 4.5 G: 4; .5 INJECTION, POWDER, LYOPHILIZED, FOR SOLUTION INTRAVENOUS; PARENTERAL at 11:04

## 2019-04-13 RX ADMIN — OXYCODONE HYDROCHLORIDE 10 MG: 10 TABLET ORAL at 09:04

## 2019-04-13 RX ADMIN — PIPERACILLIN AND TAZOBACTAM 4.5 G: 4; .5 INJECTION, POWDER, LYOPHILIZED, FOR SOLUTION INTRAVENOUS; PARENTERAL at 02:04

## 2019-04-13 RX ADMIN — PANTOPRAZOLE SODIUM 40 MG: 40 TABLET, DELAYED RELEASE ORAL at 06:04

## 2019-04-13 RX ADMIN — VANCOMYCIN HYDROCHLORIDE 1000 MG: 1 INJECTION, POWDER, LYOPHILIZED, FOR SOLUTION INTRAVENOUS at 10:04

## 2019-04-13 RX ADMIN — OXYCODONE HYDROCHLORIDE 10 MG: 10 TABLET ORAL at 02:04

## 2019-04-13 RX ADMIN — INSULIN ASPART 2 UNITS: 100 INJECTION, SOLUTION INTRAVENOUS; SUBCUTANEOUS at 10:04

## 2019-04-13 RX ADMIN — CILOSTAZOL 50 MG: 50 TABLET ORAL at 08:04

## 2019-04-13 RX ADMIN — ASPIRIN 81 MG: 81 TABLET, COATED ORAL at 09:04

## 2019-04-13 RX ADMIN — CILOSTAZOL 50 MG: 50 TABLET ORAL at 09:04

## 2019-04-13 NOTE — ASSESSMENT & PLAN NOTE
R iliofemoral bypass (03/20/'19) with skin dehiscence of R groin inc at superior and mid aspect, no graft exposure or fascial dehiscence, 2+ DP with resolution of claudication and ischemic rest pain, s/p washout right groin and wound vac placement (04/12/'19)    - wound culture with presumptive proteus species. Susceptibility pending. Currently on Vanc/Zosyn until sensitivity returns. Afebrile.   - plan wound vac change Monday.   - dispo pending establishment of home vac/home health, case management/social work consulted

## 2019-04-13 NOTE — PROGRESS NOTES
[facility]  Vascular Surgery  Progress Note    Patient Name: Nelsy Marino  MRN: [id number]  Admission Date: 4/11/2019  Primary Care Provider: Juan F Garay    Subjective:     Interval History: OR washout yesterday. No acute issues overnight.     Post-Op Info:  Procedure(s) (LRB):  Incision and Drainage - R groin washout, possible muscle flap (Right)   1 Day Post-Op       Medications:  Continuous Infusions:  Scheduled Meds:   amLODIPine  10 mg Oral Daily    aspirin  81 mg Oral Daily    cilostazol  50 mg Oral BID    losartan-hydrochlorothiazide 100-25 mg  1 tablet Oral Daily    piperacillin-tazobactam (ZOSYN) IVPB  4.5 g Intravenous Q8H    vancomycin (VANCOCIN) IVPB  1,000 mg Intravenous Q24H     PRN Meds:dextrose 50%, dextrose 50%, glucagon (human recombinant), glucose, glucose, HYDROmorphone, insulin aspart U-100, oxyCODONE, oxyCODONE, sodium chloride 0.9%     Objective:     Vital Signs (Most Recent):  Temp: 98.4 °F (36.9 °C) (04/13/19 0932)  Pulse: 93 (04/13/19 0932)  Resp: 16 (04/13/19 0932)  BP: 126/61 (04/13/19 0932)  SpO2: (!) 90 % (04/13/19 0932) Vital Signs (24h Range):  Temp:  [98 °F (36.7 °C)-99.2 °F (37.3 °C)] 98.4 °F (36.9 °C)  Pulse:  [] 93  Resp:  [14-22] 16  SpO2:  [90 %-100 %] 90 %  BP: (121-164)/(61-88) 126/61         Physical Exam   Constitutional: She is oriented to person, place, and time. She appears well-developed and well-nourished.   HENT:   Head: Normocephalic and atraumatic.   Eyes: Pupils are equal, round, and reactive to light. EOM are normal.   Neck: Normal range of motion. Neck supple.   Cardiovascular: Normal rate and regular rhythm.   Pulmonary/Chest: Effort normal and breath sounds normal.   Abdominal: Soft. Bowel sounds are normal.   Musculoskeletal:   Right groin wound with wound vac, no leak   Neurological: She is alert and oriented to person, place, and time.   Nursing note and vitals reviewed.      Significant Labs:  BMP:   Recent Labs   Lab  04/13/19  0412   *      K 4.6      CO2 24   BUN 20   CREATININE 1.4   CALCIUM 9.0   MG 1.9     CBC:   Recent Labs   Lab 04/13/19  0412   WBC 7.47   RBC 3.62*   HGB 8.9*   HCT 29.1*      MCV 80*   MCH 24.6*   MCHC 30.6*       Wound culture - updated 04/13/'19 09:41  PRESUMPTIVE PROTEUS SPECIES   Many   Identification and susceptibility pending     Significant Diagnostics:  none    Assessment/Plan:     Wound dehiscence  R iliofemoral bypass (03/20/'19) with skin dehiscence of R groin inc at superior and mid aspect, no graft exposure or fascial dehiscence, 2+ DP with resolution of claudication and ischemic rest pain, s/p washout right groin and wound vac placement (04/12/'19)    - R groin wound culture swab obtained in ER +proteus species, intraoperative assessment consistent with seroma and intraoperative cultures negative to date.  Afebrile, WBC normal - cont IV Abx and monitoring cultures closely   - plan wound vac change Monday.   - dispo pending establishment of home vac/home health, case management/social work consulted                Celio Oconnell MD  Vascular Surgery  [facility]

## 2019-04-13 NOTE — PLAN OF CARE
Problem: Adult Inpatient Plan of Care  Goal: Plan of Care Review  Outcome: Ongoing (interventions implemented as appropriate)  POC reviewed with pt, pt verbalized understanding. VSS. Pt AAOx4. Pt c/o pain during shift,  relieved with prescribed meds. Pt denies nausea during shift.  Pt tolerating 2000 ADA diet. Pt moving independently in bed.  Pt remains free of falls, injuries and trauma during shift. No distress noted at this time, will continue to monitor. Wound VAc to R groin CDI @ 125. No acute distress noted WCTM.

## 2019-04-13 NOTE — SUBJECTIVE & OBJECTIVE
Medications:  Continuous Infusions:  Scheduled Meds:   amLODIPine  10 mg Oral Daily    aspirin  81 mg Oral Daily    cilostazol  50 mg Oral BID    losartan-hydrochlorothiazide 100-25 mg  1 tablet Oral Daily    piperacillin-tazobactam (ZOSYN) IVPB  4.5 g Intravenous Q8H    vancomycin (VANCOCIN) IVPB  1,000 mg Intravenous Q24H     PRN Meds:dextrose 50%, dextrose 50%, glucagon (human recombinant), glucose, glucose, HYDROmorphone, insulin aspart U-100, oxyCODONE, oxyCODONE, sodium chloride 0.9%     Objective:     Vital Signs (Most Recent):  Temp: 98.4 °F (36.9 °C) (04/13/19 0932)  Pulse: 93 (04/13/19 0932)  Resp: 16 (04/13/19 0932)  BP: 126/61 (04/13/19 0932)  SpO2: (!) 90 % (04/13/19 0932) Vital Signs (24h Range):  Temp:  [98 °F (36.7 °C)-99.2 °F (37.3 °C)] 98.4 °F (36.9 °C)  Pulse:  [] 93  Resp:  [14-22] 16  SpO2:  [90 %-100 %] 90 %  BP: (121-164)/(61-88) 126/61         Physical Exam   Constitutional: She is oriented to person, place, and time. She appears well-developed and well-nourished.   HENT:   Head: Normocephalic and atraumatic.   Eyes: Pupils are equal, round, and reactive to light. EOM are normal.   Neck: Normal range of motion. Neck supple.   Cardiovascular: Normal rate and regular rhythm.   Pulmonary/Chest: Effort normal and breath sounds normal.   Abdominal: Soft. Bowel sounds are normal.   Musculoskeletal:   Right groin wound with wound vac, no leak   Neurological: She is alert and oriented to person, place, and time.   Nursing note and vitals reviewed.      Significant Labs:  BMP:   Recent Labs   Lab 04/13/19 0412   *      K 4.6      CO2 24   BUN 20   CREATININE 1.4   CALCIUM 9.0   MG 1.9     CBC:   Recent Labs   Lab 04/13/19 0412   WBC 7.47   RBC 3.62*   HGB 8.9*   HCT 29.1*      MCV 80*   MCH 24.6*   MCHC 30.6*       Wound culture - updated 04/13/'19 09:41  PRESUMPTIVE PROTEUS SPECIES   Many   Identification and susceptibility pending     Significant  Diagnostics:  none

## 2019-04-13 NOTE — CONSULTS
"Food & Nutrition  Education    Diet Education: Diabetic  Time Spent: 35 minutes  Learners: Mrs. Marino and       Nutrition Education provided with handouts: Reading food labels, diabetes 2 nutrition therapy       Comments: Pt sitting up in bed with spouse to bedside. Pt reports not following a "close" DM diet pta. She reports consuming 2 meals/day with some snacks-cookies, crackers, peppermints. Pt reports checking her BG levels 4-5 times/day. She states he BG levels will sometimes "be too low or too high". Informed pt of HbA1C level. She states her A1C is typically ~8%. Educated pt on how to read a nutrition facts label and CHO counting. Pt interactive during lesson and able to provide feedback of how many CHO choices in a serving (using a bag of peppermints). Encouraged pt to consume 3 meals/day. Pt agreed. Provided handouts and RD contact information.       All questions and concerns answered.       Please Re-consult as needed        Thanks!  Joaquina Bernstein RD, LDN  04/13/2019        "

## 2019-04-14 LAB
ANION GAP SERPL CALC-SCNC: 8 MMOL/L (ref 8–16)
ANION GAP SERPL CALC-SCNC: 9 MMOL/L (ref 8–16)
BASOPHILS # BLD AUTO: 0.01 K/UL (ref 0–0.2)
BASOPHILS NFR BLD: 0.1 % (ref 0–1.9)
BUN SERPL-MCNC: 21 MG/DL (ref 8–23)
BUN SERPL-MCNC: 23 MG/DL (ref 8–23)
CALCIUM SERPL-MCNC: 9 MG/DL (ref 8.7–10.5)
CALCIUM SERPL-MCNC: 9.4 MG/DL (ref 8.7–10.5)
CHLORIDE SERPL-SCNC: 102 MMOL/L (ref 95–110)
CHLORIDE SERPL-SCNC: 104 MMOL/L (ref 95–110)
CO2 SERPL-SCNC: 23 MMOL/L (ref 23–29)
CO2 SERPL-SCNC: 24 MMOL/L (ref 23–29)
CREAT SERPL-MCNC: 1.4 MG/DL (ref 0.5–1.4)
CREAT SERPL-MCNC: 1.6 MG/DL (ref 0.5–1.4)
DIFFERENTIAL METHOD: ABNORMAL
EOSINOPHIL # BLD AUTO: 0.4 K/UL (ref 0–0.5)
EOSINOPHIL NFR BLD: 5.6 % (ref 0–8)
ERYTHROCYTE [DISTWIDTH] IN BLOOD BY AUTOMATED COUNT: 14.6 % (ref 11.5–14.5)
EST. GFR  (AFRICAN AMERICAN): 35.3 ML/MIN/1.73 M^2
EST. GFR  (AFRICAN AMERICAN): 41.5 ML/MIN/1.73 M^2
EST. GFR  (NON AFRICAN AMERICAN): 30.6 ML/MIN/1.73 M^2
EST. GFR  (NON AFRICAN AMERICAN): 36 ML/MIN/1.73 M^2
GLUCOSE SERPL-MCNC: 226 MG/DL (ref 70–110)
GLUCOSE SERPL-MCNC: 231 MG/DL (ref 70–110)
HCT VFR BLD AUTO: 28.6 % (ref 37–48.5)
HGB BLD-MCNC: 8.6 G/DL (ref 12–16)
IMM GRANULOCYTES # BLD AUTO: 0.02 K/UL (ref 0–0.04)
IMM GRANULOCYTES NFR BLD AUTO: 0.3 % (ref 0–0.5)
LYMPHOCYTES # BLD AUTO: 1.2 K/UL (ref 1–4.8)
LYMPHOCYTES NFR BLD: 15.8 % (ref 18–48)
MAGNESIUM SERPL-MCNC: 1.6 MG/DL (ref 1.6–2.6)
MAGNESIUM SERPL-MCNC: 1.7 MG/DL (ref 1.6–2.6)
MCH RBC QN AUTO: 24.8 PG (ref 27–31)
MCHC RBC AUTO-ENTMCNC: 30.1 G/DL (ref 32–36)
MCV RBC AUTO: 82 FL (ref 82–98)
MONOCYTES # BLD AUTO: 0.5 K/UL (ref 0.3–1)
MONOCYTES NFR BLD: 6 % (ref 4–15)
NEUTROPHILS # BLD AUTO: 5.7 K/UL (ref 1.8–7.7)
NEUTROPHILS NFR BLD: 72.2 % (ref 38–73)
NRBC BLD-RTO: 0 /100 WBC
PHOSPHATE SERPL-MCNC: 3.7 MG/DL (ref 2.7–4.5)
PHOSPHATE SERPL-MCNC: 4.5 MG/DL (ref 2.7–4.5)
PLATELET # BLD AUTO: 305 K/UL (ref 150–350)
PMV BLD AUTO: 10.6 FL (ref 9.2–12.9)
POCT GLUCOSE: 221 MG/DL (ref 70–110)
POCT GLUCOSE: 225 MG/DL (ref 70–110)
POCT GLUCOSE: 234 MG/DL (ref 70–110)
POCT GLUCOSE: 270 MG/DL (ref 70–110)
POTASSIUM SERPL-SCNC: 4.2 MMOL/L (ref 3.5–5.1)
POTASSIUM SERPL-SCNC: 4.7 MMOL/L (ref 3.5–5.1)
RBC # BLD AUTO: 3.47 M/UL (ref 4–5.4)
SODIUM SERPL-SCNC: 134 MMOL/L (ref 136–145)
SODIUM SERPL-SCNC: 136 MMOL/L (ref 136–145)
VANCOMYCIN TROUGH SERPL-MCNC: 8.1 UG/ML (ref 10–22)
WBC # BLD AUTO: 7.87 K/UL (ref 3.9–12.7)

## 2019-04-14 PROCEDURE — 20600001 HC STEP DOWN PRIVATE ROOM

## 2019-04-14 PROCEDURE — 25000003 PHARM REV CODE 250: Performed by: STUDENT IN AN ORGANIZED HEALTH CARE EDUCATION/TRAINING PROGRAM

## 2019-04-14 PROCEDURE — 84100 ASSAY OF PHOSPHORUS: CPT

## 2019-04-14 PROCEDURE — 84100 ASSAY OF PHOSPHORUS: CPT | Mod: 91

## 2019-04-14 PROCEDURE — 36415 COLL VENOUS BLD VENIPUNCTURE: CPT

## 2019-04-14 PROCEDURE — 63600175 PHARM REV CODE 636 W HCPCS: Performed by: STUDENT IN AN ORGANIZED HEALTH CARE EDUCATION/TRAINING PROGRAM

## 2019-04-14 PROCEDURE — 80202 ASSAY OF VANCOMYCIN: CPT

## 2019-04-14 PROCEDURE — 80048 BASIC METABOLIC PNL TOTAL CA: CPT | Mod: 91

## 2019-04-14 PROCEDURE — 85025 COMPLETE CBC W/AUTO DIFF WBC: CPT | Mod: 91

## 2019-04-14 PROCEDURE — 80048 BASIC METABOLIC PNL TOTAL CA: CPT

## 2019-04-14 PROCEDURE — 83735 ASSAY OF MAGNESIUM: CPT

## 2019-04-14 PROCEDURE — 83735 ASSAY OF MAGNESIUM: CPT | Mod: 91

## 2019-04-14 RX ORDER — POLYETHYLENE GLYCOL 3350 17 G/17G
17 POWDER, FOR SOLUTION ORAL DAILY
Status: DISCONTINUED | OUTPATIENT
Start: 2019-04-14 | End: 2019-04-17 | Stop reason: HOSPADM

## 2019-04-14 RX ORDER — VANCOMYCIN HCL IN 5 % DEXTROSE 1G/250ML
1000 PLASTIC BAG, INJECTION (ML) INTRAVENOUS
Status: COMPLETED | OUTPATIENT
Start: 2019-04-14 | End: 2019-04-15

## 2019-04-14 RX ORDER — VANCOMYCIN HCL IN 5 % DEXTROSE 1.25 G/25
1250 PLASTIC BAG, INJECTION (ML) INTRAVENOUS
Status: DISCONTINUED | OUTPATIENT
Start: 2019-04-15 | End: 2019-04-14

## 2019-04-14 RX ORDER — VANCOMYCIN HCL IN 5 % DEXTROSE 1G/250ML
1000 PLASTIC BAG, INJECTION (ML) INTRAVENOUS
Status: DISCONTINUED | OUTPATIENT
Start: 2019-04-15 | End: 2019-04-14

## 2019-04-14 RX ORDER — VANCOMYCIN HCL IN 5 % DEXTROSE 1.25 G/25
1250 PLASTIC BAG, INJECTION (ML) INTRAVENOUS
Status: DISCONTINUED | OUTPATIENT
Start: 2019-04-16 | End: 2019-04-15

## 2019-04-14 RX ADMIN — VANCOMYCIN HYDROCHLORIDE 1000 MG: 1 INJECTION, POWDER, LYOPHILIZED, FOR SOLUTION INTRAVENOUS at 10:04

## 2019-04-14 RX ADMIN — PIPERACILLIN AND TAZOBACTAM 4.5 G: 4; .5 INJECTION, POWDER, LYOPHILIZED, FOR SOLUTION INTRAVENOUS; PARENTERAL at 11:04

## 2019-04-14 RX ADMIN — OXYCODONE HYDROCHLORIDE 10 MG: 10 TABLET ORAL at 11:04

## 2019-04-14 RX ADMIN — INSULIN ASPART 1 UNITS: 100 INJECTION, SOLUTION INTRAVENOUS; SUBCUTANEOUS at 11:04

## 2019-04-14 RX ADMIN — CILOSTAZOL 50 MG: 50 TABLET ORAL at 08:04

## 2019-04-14 RX ADMIN — POLYETHYLENE GLYCOL 3350 17 G: 17 POWDER, FOR SOLUTION ORAL at 10:04

## 2019-04-15 LAB
ANION GAP SERPL CALC-SCNC: 10 MMOL/L (ref 8–16)
BACTERIA SPEC AEROBE CULT: NORMAL
BACTERIA SPEC AEROBE CULT: NORMAL
BASOPHILS # BLD AUTO: 0.01 K/UL (ref 0–0.2)
BASOPHILS # BLD AUTO: 0.02 K/UL (ref 0–0.2)
BASOPHILS NFR BLD: 0.1 % (ref 0–1.9)
BASOPHILS NFR BLD: 0.3 % (ref 0–1.9)
BUN SERPL-MCNC: 16 MG/DL (ref 8–23)
CALCIUM SERPL-MCNC: 9.2 MG/DL (ref 8.7–10.5)
CHLORIDE SERPL-SCNC: 102 MMOL/L (ref 95–110)
CO2 SERPL-SCNC: 20 MMOL/L (ref 23–29)
CREAT SERPL-MCNC: 1.2 MG/DL (ref 0.5–1.4)
DIFFERENTIAL METHOD: ABNORMAL
DIFFERENTIAL METHOD: ABNORMAL
EOSINOPHIL # BLD AUTO: 0.4 K/UL (ref 0–0.5)
EOSINOPHIL # BLD AUTO: 0.5 K/UL (ref 0–0.5)
EOSINOPHIL NFR BLD: 6.4 % (ref 0–8)
EOSINOPHIL NFR BLD: 6.5 % (ref 0–8)
ERYTHROCYTE [DISTWIDTH] IN BLOOD BY AUTOMATED COUNT: 14.5 % (ref 11.5–14.5)
ERYTHROCYTE [DISTWIDTH] IN BLOOD BY AUTOMATED COUNT: 14.5 % (ref 11.5–14.5)
EST. GFR  (AFRICAN AMERICAN): 50 ML/MIN/1.73 M^2
EST. GFR  (NON AFRICAN AMERICAN): 43.4 ML/MIN/1.73 M^2
GLUCOSE SERPL-MCNC: 205 MG/DL (ref 70–110)
HCT VFR BLD AUTO: 27.5 % (ref 37–48.5)
HCT VFR BLD AUTO: 28.6 % (ref 37–48.5)
HGB BLD-MCNC: 8.4 G/DL (ref 12–16)
HGB BLD-MCNC: 8.7 G/DL (ref 12–16)
IMM GRANULOCYTES # BLD AUTO: 0.02 K/UL (ref 0–0.04)
IMM GRANULOCYTES # BLD AUTO: 0.02 K/UL (ref 0–0.04)
IMM GRANULOCYTES NFR BLD AUTO: 0.3 % (ref 0–0.5)
IMM GRANULOCYTES NFR BLD AUTO: 0.3 % (ref 0–0.5)
LYMPHOCYTES # BLD AUTO: 1 K/UL (ref 1–4.8)
LYMPHOCYTES # BLD AUTO: 1.5 K/UL (ref 1–4.8)
LYMPHOCYTES NFR BLD: 14.7 % (ref 18–48)
LYMPHOCYTES NFR BLD: 19.7 % (ref 18–48)
MAGNESIUM SERPL-MCNC: 1.6 MG/DL (ref 1.6–2.6)
MCH RBC QN AUTO: 24.6 PG (ref 27–31)
MCH RBC QN AUTO: 24.7 PG (ref 27–31)
MCHC RBC AUTO-ENTMCNC: 30.4 G/DL (ref 32–36)
MCHC RBC AUTO-ENTMCNC: 30.5 G/DL (ref 32–36)
MCV RBC AUTO: 81 FL (ref 82–98)
MCV RBC AUTO: 81 FL (ref 82–98)
MONOCYTES # BLD AUTO: 0.4 K/UL (ref 0.3–1)
MONOCYTES # BLD AUTO: 0.6 K/UL (ref 0.3–1)
MONOCYTES NFR BLD: 5.8 % (ref 4–15)
MONOCYTES NFR BLD: 8.1 % (ref 4–15)
NEUTROPHILS # BLD AUTO: 4.9 K/UL (ref 1.8–7.7)
NEUTROPHILS # BLD AUTO: 4.9 K/UL (ref 1.8–7.7)
NEUTROPHILS NFR BLD: 65.1 % (ref 38–73)
NEUTROPHILS NFR BLD: 72.7 % (ref 38–73)
NRBC BLD-RTO: 0 /100 WBC
NRBC BLD-RTO: 0 /100 WBC
PHOSPHATE SERPL-MCNC: 3 MG/DL (ref 2.7–4.5)
PLATELET # BLD AUTO: 300 K/UL (ref 150–350)
PLATELET # BLD AUTO: 303 K/UL (ref 150–350)
PMV BLD AUTO: 10.4 FL (ref 9.2–12.9)
PMV BLD AUTO: 10.6 FL (ref 9.2–12.9)
POCT GLUCOSE: 225 MG/DL (ref 70–110)
POCT GLUCOSE: 251 MG/DL (ref 70–110)
POCT GLUCOSE: 256 MG/DL (ref 70–110)
POTASSIUM SERPL-SCNC: 4.4 MMOL/L (ref 3.5–5.1)
RBC # BLD AUTO: 3.4 M/UL (ref 4–5.4)
RBC # BLD AUTO: 3.53 M/UL (ref 4–5.4)
SODIUM SERPL-SCNC: 132 MMOL/L (ref 136–145)
WBC # BLD AUTO: 6.68 K/UL (ref 3.9–12.7)
WBC # BLD AUTO: 7.57 K/UL (ref 3.9–12.7)

## 2019-04-15 PROCEDURE — 99024 POSTOP FOLLOW-UP VISIT: CPT | Mod: ,,, | Performed by: SURGERY

## 2019-04-15 PROCEDURE — 25000003 PHARM REV CODE 250: Performed by: STUDENT IN AN ORGANIZED HEALTH CARE EDUCATION/TRAINING PROGRAM

## 2019-04-15 PROCEDURE — 63600175 PHARM REV CODE 636 W HCPCS: Performed by: STUDENT IN AN ORGANIZED HEALTH CARE EDUCATION/TRAINING PROGRAM

## 2019-04-15 PROCEDURE — 85025 COMPLETE CBC W/AUTO DIFF WBC: CPT

## 2019-04-15 PROCEDURE — 36415 COLL VENOUS BLD VENIPUNCTURE: CPT

## 2019-04-15 PROCEDURE — 20600001 HC STEP DOWN PRIVATE ROOM

## 2019-04-15 PROCEDURE — 84100 ASSAY OF PHOSPHORUS: CPT

## 2019-04-15 PROCEDURE — 80048 BASIC METABOLIC PNL TOTAL CA: CPT

## 2019-04-15 PROCEDURE — 83735 ASSAY OF MAGNESIUM: CPT

## 2019-04-15 PROCEDURE — 25000003 PHARM REV CODE 250: Performed by: SURGERY

## 2019-04-15 PROCEDURE — 99024 PR POST-OP FOLLOW-UP VISIT: ICD-10-PCS | Mod: ,,, | Performed by: SURGERY

## 2019-04-15 RX ORDER — ONDANSETRON 2 MG/ML
4 INJECTION INTRAMUSCULAR; INTRAVENOUS EVERY 6 HOURS PRN
Status: DISCONTINUED | OUTPATIENT
Start: 2019-04-15 | End: 2019-04-17 | Stop reason: HOSPADM

## 2019-04-15 RX ADMIN — PIPERACILLIN AND TAZOBACTAM 4.5 G: 4; .5 INJECTION, POWDER, LYOPHILIZED, FOR SOLUTION INTRAVENOUS; PARENTERAL at 05:04

## 2019-04-15 RX ADMIN — OXYCODONE HYDROCHLORIDE 10 MG: 10 TABLET ORAL at 10:04

## 2019-04-15 RX ADMIN — ONDANSETRON 4 MG: 2 INJECTION INTRAMUSCULAR; INTRAVENOUS at 12:04

## 2019-04-15 RX ADMIN — INSULIN ASPART 1 UNITS: 100 INJECTION, SOLUTION INTRAVENOUS; SUBCUTANEOUS at 08:04

## 2019-04-15 RX ADMIN — CILOSTAZOL 50 MG: 50 TABLET ORAL at 10:04

## 2019-04-15 RX ADMIN — VANCOMYCIN HYDROCHLORIDE 1000 MG: 1 INJECTION, POWDER, LYOPHILIZED, FOR SOLUTION INTRAVENOUS at 03:04

## 2019-04-15 RX ADMIN — ASPIRIN 81 MG: 81 TABLET, COATED ORAL at 10:04

## 2019-04-15 RX ADMIN — PIPERACILLIN AND TAZOBACTAM 4.5 G: 4; .5 INJECTION, POWDER, LYOPHILIZED, FOR SOLUTION INTRAVENOUS; PARENTERAL at 02:04

## 2019-04-15 RX ADMIN — OXYCODONE HYDROCHLORIDE 10 MG: 10 TABLET ORAL at 08:04

## 2019-04-15 RX ADMIN — INSULIN ASPART 3 UNITS: 100 INJECTION, SOLUTION INTRAVENOUS; SUBCUTANEOUS at 12:04

## 2019-04-15 RX ADMIN — PANTOPRAZOLE SODIUM 40 MG: 40 TABLET, DELAYED RELEASE ORAL at 05:04

## 2019-04-15 RX ADMIN — LOSARTAN POTASSIUM AND HYDROCHLOROTHIAZIDE 1 TABLET: 25; 100 TABLET ORAL at 10:04

## 2019-04-15 RX ADMIN — INSULIN ASPART 2 UNITS: 100 INJECTION, SOLUTION INTRAVENOUS; SUBCUTANEOUS at 08:04

## 2019-04-15 RX ADMIN — PIPERACILLIN AND TAZOBACTAM 4.5 G: 4; .5 INJECTION, POWDER, LYOPHILIZED, FOR SOLUTION INTRAVENOUS; PARENTERAL at 10:04

## 2019-04-15 RX ADMIN — AMLODIPINE BESYLATE 10 MG: 10 TABLET ORAL at 10:04

## 2019-04-15 NOTE — PLAN OF CARE
04/15/19 1446   Readmission Questionnaire   At the time of your discharge, did someone talk to you about what your health problems were? Yes   At the time of discharge, did someone talk to you about what to watch out for regarding worsening of your health problem? Yes   At the time of discharge, did someone talk to you about what to do if you experienced worsening of your health problem? Yes   At the time of discharge, did someone talk to you about which medication to take when you left the hospital and which ones to stop taking? Yes   At the time of discharge, did someone talk to you about when and where to follow up with a doctor after you left the hospital? Yes   What do you believe caused you to be sick enough to be re-admitted? Pain   How often do you need to have someone help you when you read instructions, pamphlets, or other written material from your doctor or pharmacy? Always   Do you have problems taking your medications as prescribed? No   Do you have any problems affording any of  your prescribed medications? No   Do you have problems obtaining/receiving your medications? No   Does the patient have transportation to healthcare appointments? Yes   Lives With spouse;grandchild(elisa)   Living Arrangements house   Does the patient have family/friends to help with healtcare needs after discharge? yes   Who are your caregiver(s) and their phone number(s)? Jospeh  - Spouse   Does your caregiver provide all the help you need? Yes   Are you currently feeling confused? No   Are you currently having problems thinking? No   Are you currently having memory problems? No   Have you felt down, depressed, or hopeless? 0   Have you felt little interest or pleasure in doing things? 0   In the last 7 days, my sleep quality was: good

## 2019-04-15 NOTE — PROGRESS NOTES
[facility]  Vascular Surgery  Progress Note    Patient Name: Nelsy Marino  MRN: [id number]  Admission Date: 4/11/2019  Primary Care Provider: Juan F Garay    Subjective:     Interval History: No acute events overnight.  Remains AF with VSS. Wound vac to right groin with good seal and minimal output.    Post-Op Info:  Procedure(s) (LRB):  Incision and Drainage - R groin washout, possible muscle flap (Right)   3 Days Post-Op       Medications:  Continuous Infusions:  Scheduled Meds:   amLODIPine  10 mg Oral Daily    aspirin  81 mg Oral Daily    cilostazol  50 mg Oral BID    losartan-hydrochlorothiazide 100-25 mg  1 tablet Oral Daily    pantoprazole  40 mg Oral BID AC    piperacillin-tazobactam (ZOSYN) IVPB  4.5 g Intravenous Q8H    polyethylene glycol  17 g Oral Daily    [START ON 4/16/2019] vancomycin (VANCOCIN) IVPB  1,250 mg Intravenous Q24H     PRN Meds:dextrose 50%, dextrose 50%, glucagon (human recombinant), glucose, glucose, HYDROmorphone, insulin aspart U-100, oxyCODONE, oxyCODONE, sodium chloride 0.9%     Objective:     Vital Signs (Most Recent):  Temp: 98.3 °F (36.8 °C) (04/15/19 0331)  Pulse: 93 (04/15/19 0331)  Resp: 16 (04/15/19 0331)  BP: 136/61 (04/15/19 0331)  SpO2: 95 % (04/15/19 0331) Vital Signs (24h Range):  Temp:  [98 °F (36.7 °C)-98.4 °F (36.9 °C)] 98.3 °F (36.8 °C)  Pulse:  [93-98] 93  Resp:  [16] 16  SpO2:  [94 %-96 %] 95 %  BP: (129-140)/(59-63) 136/61         Physical Exam   Constitutional: She is oriented to person, place, and time. She appears well-developed and well-nourished.   HENT:   Head: Normocephalic and atraumatic.   Eyes: Pupils are equal, round, and reactive to light. EOM are normal.   Neck: Normal range of motion. Neck supple.   Cardiovascular: Normal rate and regular rhythm.   Pulmonary/Chest: Effort normal and breath sounds normal.   Abdominal: Soft. Bowel sounds are normal.   Musculoskeletal:   Right groin wound with wound vac, no leak    Neurological: She is alert and oriented to person, place, and time.   Nursing note and vitals reviewed.      Significant Labs:  CBC:   Recent Labs   Lab 04/15/19  0612   WBC 6.68   RBC 3.53*   HGB 8.7*   HCT 28.6*      MCV 81*   MCH 24.6*   MCHC 30.4*     CMP:   Recent Labs   Lab 04/14/19  1153   *   CALCIUM 9.4      K 4.7   CO2 24      BUN 21   CREATININE 1.4       Significant Diagnostics:  I have reviewed all pertinent imaging results/findings within the past 24 hours.    Assessment/Plan:     * Wound dehiscence  R iliofemoral bypass (03/20/'19) with skin dehiscence of R groin inc at superior and mid aspect, no graft exposure or fascial dehiscence, 2+ DP with resolution of claudication and ischemic rest pain, s/p washout right groin and wound vac placement (04/12/'19)    - R groin wound culture swab obtained in ER +proteus species, intraoperative assessment consistent with seroma and intraoperative cultures negative to date.  Afebrile, WBC normal - cont IV Abx and monitoring cultures closely D/c Vanc, continue Zosyn. Likely transition to PO cipro for discharge.  - d/c wound vac, wet to dry dressings BID - monitor wound closely  - dispo pending home health    Dispo: Appreciate SW assistance in setting up home health                Fiordaliza Wilhelm MD  Vascular Surgery  [facility]

## 2019-04-15 NOTE — SUBJECTIVE & OBJECTIVE
Interval Hx: NAEON. AFVSS. No complaints. Pain controlled. Epic down for past 16 hours. Labs pending.      Medications:  Continuous Infusions:  Scheduled Meds:   amLODIPine  10 mg Oral Daily    aspirin  81 mg Oral Daily    cilostazol  50 mg Oral BID    losartan-hydrochlorothiazide 100-25 mg  1 tablet Oral Daily    pantoprazole  40 mg Oral BID AC    piperacillin-tazobactam (ZOSYN) IVPB  4.5 g Intravenous Q8H    vancomycin (VANCOCIN) IVPB  1,000 mg Intravenous Q24H     PRN Meds:dextrose 50%, dextrose 50%, glucagon (human recombinant), glucose, glucose, HYDROmorphone, insulin aspart U-100, oxyCODONE, oxyCODONE, sodium chloride 0.9%     Objective:     Vital Signs (Most Recent):  Temp: 98.1 °F (36.7 °C) (04/13/19 2341)  Pulse: 107 (04/13/19 2341)  Resp: 16 (04/13/19 2341)  BP: (!) 136/59 (04/13/19 2341)  SpO2: 96 % (04/13/19 2341) Vital Signs (24h Range):  Temp:  [98.1 °F (36.7 °C)] 98.1 °F (36.7 °C)  Pulse:  [] 107  Resp:  [16] 16  SpO2:  [96 %] 96 %  BP: (122-136)/(59-73) 136/59         Physical Exam   Constitutional: She is oriented to person, place, and time. She appears well-developed and well-nourished.   HENT:   Head: Normocephalic and atraumatic.   Eyes: Pupils are equal, round, and reactive to light. EOM are normal.   Neck: Normal range of motion. Neck supple.   Cardiovascular: Normal rate and regular rhythm.   Pulmonary/Chest: Effort normal and breath sounds normal.   Abdominal: Soft. Bowel sounds are normal.   Musculoskeletal:   Right groin wound with wound vac, no leak   Neurological: She is alert and oriented to person, place, and time.   Nursing note and vitals reviewed.      Significant Labs:  BMP:   Recent Labs   Lab 04/13/19 0412   *      K 4.6      CO2 24   BUN 20   CREATININE 1.4   CALCIUM 9.0   MG 1.9     CBC:   Recent Labs   Lab 04/13/19 0412   WBC 7.47   RBC 3.62*   HGB 8.9*   HCT 29.1*      MCV 80*   MCH 24.6*   MCHC 30.6*       Wound culture - updated  04/13/'19 09:41  PRESUMPTIVE PROTEUS SPECIES   Many   Identification and susceptibility pending     Significant Diagnostics:  none

## 2019-04-15 NOTE — ASSESSMENT & PLAN NOTE
R iliofemoral bypass (03/20/'19) with skin dehiscence of R groin inc at superior and mid aspect, no graft exposure or fascial dehiscence, 2+ DP with resolution of claudication and ischemic rest pain, s/p washout right groin and wound vac placement (04/12/'19)    - wound culture with presumptive proteus species. Susceptibility pending. Currently on Vanc/Zosyn until sensitivity returns. Remains Afebrile.   - plan for wound vac change Monday   - dispo pending establishment of home vac/home health, case management/social work consulted    Dispo: No major changes in management today. [place] change tomorrow. Touch base with social work tomorrow concerning home health set up and wound vac approval

## 2019-04-15 NOTE — PLAN OF CARE
Problem: Adult Inpatient Plan of Care  Goal: Plan of Care Review  Outcome: Ongoing (interventions implemented as appropriate)  AAO x 4. Pt has a R groin incision with wound vac. RLQ transverse incision, staples intact. Pt is up to bedside commode with assistance, walks frequently. 2000 ADA diet, tolerating well, no complaints of N/V overnight. Room air, VS stable. Accuchecks ACHS, had to cover with 1 unit of insulin at bedtime. Pt complained of pain overnight, received PRN medications. Pt remained free of falls overnight. POC reviewed with pt who verbalized understanding. Bed in low position, call light in reach. No signs of distress or concerns noted at this time. Will continue to monitor.

## 2019-04-15 NOTE — SUBJECTIVE & OBJECTIVE
Medications:  Continuous Infusions:  Scheduled Meds:   amLODIPine  10 mg Oral Daily    aspirin  81 mg Oral Daily    cilostazol  50 mg Oral BID    losartan-hydrochlorothiazide 100-25 mg  1 tablet Oral Daily    pantoprazole  40 mg Oral BID AC    piperacillin-tazobactam (ZOSYN) IVPB  4.5 g Intravenous Q8H    polyethylene glycol  17 g Oral Daily    [START ON 4/16/2019] vancomycin (VANCOCIN) IVPB  1,250 mg Intravenous Q24H     PRN Meds:dextrose 50%, dextrose 50%, glucagon (human recombinant), glucose, glucose, HYDROmorphone, insulin aspart U-100, oxyCODONE, oxyCODONE, sodium chloride 0.9%     Objective:     Vital Signs (Most Recent):  Temp: 98.3 °F (36.8 °C) (04/15/19 0331)  Pulse: 93 (04/15/19 0331)  Resp: 16 (04/15/19 0331)  BP: 136/61 (04/15/19 0331)  SpO2: 95 % (04/15/19 0331) Vital Signs (24h Range):  Temp:  [98 °F (36.7 °C)-98.4 °F (36.9 °C)] 98.3 °F (36.8 °C)  Pulse:  [93-98] 93  Resp:  [16] 16  SpO2:  [94 %-96 %] 95 %  BP: (129-140)/(59-63) 136/61         Physical Exam   Constitutional: She is oriented to person, place, and time. She appears well-developed and well-nourished.   HENT:   Head: Normocephalic and atraumatic.   Eyes: Pupils are equal, round, and reactive to light. EOM are normal.   Neck: Normal range of motion. Neck supple.   Cardiovascular: Normal rate and regular rhythm.   Pulmonary/Chest: Effort normal and breath sounds normal.   Abdominal: Soft. Bowel sounds are normal.   Musculoskeletal:   Right groin wound with wound vac, no leak   Neurological: She is alert and oriented to person, place, and time.   Nursing note and vitals reviewed.      Significant Labs:  CBC:   Recent Labs   Lab 04/15/19  0612   WBC 6.68   RBC 3.53*   HGB 8.7*   HCT 28.6*      MCV 81*   MCH 24.6*   MCHC 30.4*     CMP:   Recent Labs   Lab 04/14/19  1153   *   CALCIUM 9.4      K 4.7   CO2 24      BUN 21   CREATININE 1.4       Significant Diagnostics:  I have reviewed all pertinent imaging  results/findings within the past 24 hours.

## 2019-04-15 NOTE — OP NOTE
4/12/2019      Pre-operative Diagnosis:  Wound dehiscence [T81.30XA]    Post-operative Diagnosis: same.    Procedures:  1. Right groin wound washout, wound #1 4x3 cm, wound #2 5x4 cm  2. Right groin wound debridement, wound #1 4x3 cm, wound #2 5x4 cm    Surgeon: Danny Dunbar MD     Assistants: Jayde Rodriguez M.D.     Anesthesia: General    Findings/Key elements:   1. Right groin wound washout, wound #1 4x3 cm, wound #2 5x4 cm  2. Right groin wound debridement, wound #1 4x3 cm, wound #2 5x4 cm  3. Serous fluid evacuated; no purulent fluid noted    Procedure Details:  The patient was seen in the Holding Room. The risks, benefits, complications, treatment options, and expected outcomes were discussed with the patient. The patient concurred with the proposed plan, giving informed consent.  The site of surgery properly noted/marked.     Patient was brought to the OR and positioned supine on the table. General anesthesia was administered by anesthesia team. Patient's bilateral groins were prepped and draped in usual sterile fashion. A Time Out was held and the above information confirmed.    The patient's dehisced wound was gently debrided. We noted no erythema of the surrounding skin. A small area at the base of the inferior wound was opened, and a moderate amount of serous fluid was removed. Cultures were sent. There was no purulent fluid or material in the wound. We then expressed as much fluid as possible, applying pressure from all sides. It was then irrigated with 3 L of bacitracin solution with a pulse vac. A wound sponge and dressing were applied, with good suction.     EBL:  5 mL           Complications:  None; patient tolerated the procedure well.           Disposition: Stable to recovery.      Dr. Dunbar was scrubbed and present for the entire procedure.    Jayde Rodriguez MD   Fellow, Vascular/Endovascular Surgery

## 2019-04-15 NOTE — PROGRESS NOTES
[facility]  Vascular Surgery  Progress Note    Patient Name: Nelsy Marino  MRN: [id number]  Admission Date: 4/11/2019  Primary Care Provider: Juan F Garay    Subjective:       Post-Op Info:  Procedure(s) (LRB):  Incision and Drainage - R groin washout, possible muscle flap (Right)   2 Days Post-Op     Interval Hx: NAEON. AFVSS. No complaints. Pain controlled. Epic down for past 16 hours. Labs pending.      Medications:  Continuous Infusions:  Scheduled Meds:   amLODIPine  10 mg Oral Daily    aspirin  81 mg Oral Daily    cilostazol  50 mg Oral BID    losartan-hydrochlorothiazide 100-25 mg  1 tablet Oral Daily    pantoprazole  40 mg Oral BID AC    piperacillin-tazobactam (ZOSYN) IVPB  4.5 g Intravenous Q8H    vancomycin (VANCOCIN) IVPB  1,000 mg Intravenous Q24H     PRN Meds:dextrose 50%, dextrose 50%, glucagon (human recombinant), glucose, glucose, HYDROmorphone, insulin aspart U-100, oxyCODONE, oxyCODONE, sodium chloride 0.9%     Objective:     Vital Signs (Most Recent):  Temp: 98.1 °F (36.7 °C) (04/13/19 2341)  Pulse: 107 (04/13/19 2341)  Resp: 16 (04/13/19 2341)  BP: (!) 136/59 (04/13/19 2341)  SpO2: 96 % (04/13/19 2341) Vital Signs (24h Range):  Temp:  [98.1 °F (36.7 °C)] 98.1 °F (36.7 °C)  Pulse:  [] 107  Resp:  [16] 16  SpO2:  [96 %] 96 %  BP: (122-136)/(59-73) 136/59         Physical Exam   Constitutional: She is oriented to person, place, and time. She appears well-developed and well-nourished.   HENT:   Head: Normocephalic and atraumatic.   Eyes: Pupils are equal, round, and reactive to light. EOM are normal.   Neck: Normal range of motion. Neck supple.   Cardiovascular: Normal rate and regular rhythm.   Pulmonary/Chest: Effort normal and breath sounds normal.   Abdominal: Soft. Bowel sounds are normal.   Musculoskeletal:   Right groin wound with wound vac, no leak   Neurological: She is alert and oriented to person, place, and time.   Nursing note and vitals  reviewed.      Significant Labs:  BMP:   Recent Labs   Lab 04/13/19  0412   *      K 4.6      CO2 24   BUN 20   CREATININE 1.4   CALCIUM 9.0   MG 1.9     CBC:   Recent Labs   Lab 04/13/19  0412   WBC 7.47   RBC 3.62*   HGB 8.9*   HCT 29.1*      MCV 80*   MCH 24.6*   MCHC 30.6*       Wound culture - updated 04/13/'19 09:41  PRESUMPTIVE PROTEUS SPECIES   Many   Identification and susceptibility pending     Significant Diagnostics:  none    Assessment/Plan:     * Wound dehiscence  R iliofemoral bypass (03/20/'19) with skin dehiscence of R groin inc at superior and mid aspect, no graft exposure or fascial dehiscence, 2+ DP with resolution of claudication and ischemic rest pain, s/p washout right groin and wound vac placement (04/12/'19)    - R groin wound culture swab obtained in ER +proteus species, intraoperative assessment consistent with seroma and intraoperative cultures negative to date.  Afebrile, WBC normal - cont IV Abx and monitoring cultures closely   - plan for wound vac change Monday   - dispo pending establishment of home vac/home health, case management/social work consulted    Dispo: No major changes in management today. WV change tomorrow. Touch base with social work tomorrow concerning home health set up and wound vac approval                Jack Jimenez MD  Vascular Surgery  [facility]

## 2019-04-15 NOTE — ASSESSMENT & PLAN NOTE
R iliofemoral bypass (03/20/'19) with skin dehiscence of R groin inc at superior and mid aspect, no graft exposure or fascial dehiscence, 2+ DP with resolution of claudication and ischemic rest pain, s/p washout right groin and wound vac placement (04/12/'19)    - wound culture grew proteus, pan-sensitive. D/c Vanc, continue Zosyn. Likely transition to PO cipro for discharge.  - d/c wound vac, wet to dry dressings BID  - dispo pending home health    Dispo: Anticipate discharge early this week, appreciate SW assistance in setting up home health

## 2019-04-16 PROBLEM — M96.843 POSTOPERATIVE SEROMA OF MUSCULOSKELETAL STRUCTURE AFTER NON-MUSCULOSKELETAL PROCEDURE: Status: ACTIVE | Noted: 2019-04-16

## 2019-04-16 LAB
ANION GAP SERPL CALC-SCNC: 6 MMOL/L (ref 8–16)
ANION GAP SERPL CALC-SCNC: 9 MMOL/L (ref 8–16)
BACTERIA BLD CULT: NORMAL
BACTERIA BLD CULT: NORMAL
BACTERIA SPEC ANAEROBE CULT: NORMAL
BASOPHILS # BLD AUTO: 0.01 K/UL (ref 0–0.2)
BASOPHILS NFR BLD: 0.2 % (ref 0–1.9)
BUN SERPL-MCNC: 19 MG/DL (ref 8–23)
BUN SERPL-MCNC: 20 MG/DL (ref 8–23)
CALCIUM SERPL-MCNC: 8.7 MG/DL (ref 8.7–10.5)
CALCIUM SERPL-MCNC: 8.8 MG/DL (ref 8.7–10.5)
CHLORIDE SERPL-SCNC: 103 MMOL/L (ref 95–110)
CHLORIDE SERPL-SCNC: 103 MMOL/L (ref 95–110)
CO2 SERPL-SCNC: 21 MMOL/L (ref 23–29)
CO2 SERPL-SCNC: 23 MMOL/L (ref 23–29)
CREAT SERPL-MCNC: 1.5 MG/DL (ref 0.5–1.4)
CREAT SERPL-MCNC: 1.6 MG/DL (ref 0.5–1.4)
DIFFERENTIAL METHOD: ABNORMAL
EOSINOPHIL # BLD AUTO: 0.4 K/UL (ref 0–0.5)
EOSINOPHIL NFR BLD: 5.6 % (ref 0–8)
ERYTHROCYTE [DISTWIDTH] IN BLOOD BY AUTOMATED COUNT: 14.4 % (ref 11.5–14.5)
EST. GFR  (AFRICAN AMERICAN): 35.3 ML/MIN/1.73 M^2
EST. GFR  (AFRICAN AMERICAN): 38.2 ML/MIN/1.73 M^2
EST. GFR  (NON AFRICAN AMERICAN): 30.6 ML/MIN/1.73 M^2
EST. GFR  (NON AFRICAN AMERICAN): 33.1 ML/MIN/1.73 M^2
GLUCOSE SERPL-MCNC: 177 MG/DL (ref 70–110)
GLUCOSE SERPL-MCNC: 220 MG/DL (ref 70–110)
HCT VFR BLD AUTO: 28 % (ref 37–48.5)
HGB BLD-MCNC: 8.8 G/DL (ref 12–16)
IMM GRANULOCYTES # BLD AUTO: 0.02 K/UL (ref 0–0.04)
IMM GRANULOCYTES NFR BLD AUTO: 0.3 % (ref 0–0.5)
LYMPHOCYTES # BLD AUTO: 1 K/UL (ref 1–4.8)
LYMPHOCYTES NFR BLD: 16.4 % (ref 18–48)
MAGNESIUM SERPL-MCNC: 1.5 MG/DL (ref 1.6–2.6)
MCH RBC QN AUTO: 24.8 PG (ref 27–31)
MCHC RBC AUTO-ENTMCNC: 31.4 G/DL (ref 32–36)
MCV RBC AUTO: 79 FL (ref 82–98)
MONOCYTES # BLD AUTO: 0.5 K/UL (ref 0.3–1)
MONOCYTES NFR BLD: 8 % (ref 4–15)
NEUTROPHILS # BLD AUTO: 4.3 K/UL (ref 1.8–7.7)
NEUTROPHILS NFR BLD: 69.5 % (ref 38–73)
NRBC BLD-RTO: 0 /100 WBC
PHOSPHATE SERPL-MCNC: 3 MG/DL (ref 2.7–4.5)
PLATELET # BLD AUTO: 311 K/UL (ref 150–350)
PMV BLD AUTO: 10.7 FL (ref 9.2–12.9)
POCT GLUCOSE: 220 MG/DL (ref 70–110)
POCT GLUCOSE: 229 MG/DL (ref 70–110)
POCT GLUCOSE: 234 MG/DL (ref 70–110)
POTASSIUM SERPL-SCNC: 4.5 MMOL/L (ref 3.5–5.1)
POTASSIUM SERPL-SCNC: 4.8 MMOL/L (ref 3.5–5.1)
RBC # BLD AUTO: 3.55 M/UL (ref 4–5.4)
SODIUM SERPL-SCNC: 132 MMOL/L (ref 136–145)
SODIUM SERPL-SCNC: 133 MMOL/L (ref 136–145)
WBC # BLD AUTO: 6.23 K/UL (ref 3.9–12.7)

## 2019-04-16 PROCEDURE — 99024 PR POST-OP FOLLOW-UP VISIT: ICD-10-PCS | Mod: ,,, | Performed by: SURGERY

## 2019-04-16 PROCEDURE — 63600175 PHARM REV CODE 636 W HCPCS: Performed by: STUDENT IN AN ORGANIZED HEALTH CARE EDUCATION/TRAINING PROGRAM

## 2019-04-16 PROCEDURE — 25000003 PHARM REV CODE 250: Performed by: SURGERY

## 2019-04-16 PROCEDURE — 36415 COLL VENOUS BLD VENIPUNCTURE: CPT

## 2019-04-16 PROCEDURE — 25000003 PHARM REV CODE 250: Performed by: STUDENT IN AN ORGANIZED HEALTH CARE EDUCATION/TRAINING PROGRAM

## 2019-04-16 PROCEDURE — 80048 BASIC METABOLIC PNL TOTAL CA: CPT | Mod: 91

## 2019-04-16 PROCEDURE — 99024 POSTOP FOLLOW-UP VISIT: CPT | Mod: ,,, | Performed by: SURGERY

## 2019-04-16 PROCEDURE — 20600001 HC STEP DOWN PRIVATE ROOM

## 2019-04-16 PROCEDURE — 85025 COMPLETE CBC W/AUTO DIFF WBC: CPT

## 2019-04-16 PROCEDURE — 83735 ASSAY OF MAGNESIUM: CPT

## 2019-04-16 PROCEDURE — 84100 ASSAY OF PHOSPHORUS: CPT

## 2019-04-16 PROCEDURE — 80048 BASIC METABOLIC PNL TOTAL CA: CPT

## 2019-04-16 RX ADMIN — PANTOPRAZOLE SODIUM 40 MG: 40 TABLET, DELAYED RELEASE ORAL at 05:04

## 2019-04-16 RX ADMIN — PIPERACILLIN AND TAZOBACTAM 4.5 G: 4; .5 INJECTION, POWDER, LYOPHILIZED, FOR SOLUTION INTRAVENOUS; PARENTERAL at 09:04

## 2019-04-16 RX ADMIN — AMLODIPINE BESYLATE 10 MG: 10 TABLET ORAL at 08:04

## 2019-04-16 RX ADMIN — PIPERACILLIN AND TAZOBACTAM 4.5 G: 4; .5 INJECTION, POWDER, LYOPHILIZED, FOR SOLUTION INTRAVENOUS; PARENTERAL at 03:04

## 2019-04-16 RX ADMIN — INSULIN ASPART 2 UNITS: 100 INJECTION, SOLUTION INTRAVENOUS; SUBCUTANEOUS at 05:04

## 2019-04-16 RX ADMIN — INSULIN ASPART 2 UNITS: 100 INJECTION, SOLUTION INTRAVENOUS; SUBCUTANEOUS at 08:04

## 2019-04-16 RX ADMIN — SODIUM CHLORIDE 500 ML: 0.9 INJECTION, SOLUTION INTRAVENOUS at 11:04

## 2019-04-16 RX ADMIN — PIPERACILLIN AND TAZOBACTAM 4.5 G: 4; .5 INJECTION, POWDER, LYOPHILIZED, FOR SOLUTION INTRAVENOUS; PARENTERAL at 05:04

## 2019-04-16 RX ADMIN — OXYCODONE HYDROCHLORIDE 10 MG: 10 TABLET ORAL at 05:04

## 2019-04-16 RX ADMIN — ASPIRIN 81 MG: 81 TABLET, COATED ORAL at 08:04

## 2019-04-16 RX ADMIN — CILOSTAZOL 50 MG: 50 TABLET ORAL at 09:04

## 2019-04-16 RX ADMIN — CILOSTAZOL 50 MG: 50 TABLET ORAL at 08:04

## 2019-04-16 RX ADMIN — INSULIN ASPART 1 UNITS: 100 INJECTION, SOLUTION INTRAVENOUS; SUBCUTANEOUS at 09:04

## 2019-04-16 RX ADMIN — LOSARTAN POTASSIUM AND HYDROCHLOROTHIAZIDE 1 TABLET: 25; 100 TABLET ORAL at 08:04

## 2019-04-16 NOTE — SUBJECTIVE & OBJECTIVE
Medications:  Continuous Infusions:  Scheduled Meds:   amLODIPine  10 mg Oral Daily    aspirin  81 mg Oral Daily    cilostazol  50 mg Oral BID    losartan-hydrochlorothiazide 100-25 mg  1 tablet Oral Daily    pantoprazole  40 mg Oral BID AC    piperacillin-tazobactam (ZOSYN) IVPB  4.5 g Intravenous Q8H    polyethylene glycol  17 g Oral Daily     PRN Meds:dextrose 50%, dextrose 50%, glucagon (human recombinant), glucose, glucose, HYDROmorphone, insulin aspart U-100, ondansetron, oxyCODONE, oxyCODONE, sodium chloride 0.9%     Objective:     Vital Signs (Most Recent):  Temp: 98.6 °F (37 °C) (04/16/19 0458)  Pulse: 108 (04/16/19 0458)  Resp: 18 (04/16/19 0458)  BP: (!) 116/56 (04/16/19 0458)  SpO2: (!) 93 % (04/16/19 0458) Vital Signs (24h Range):  Temp:  [97.2 °F (36.2 °C)-99.1 °F (37.3 °C)] 98.6 °F (37 °C)  Pulse:  [] 108  Resp:  [16-20] 18  SpO2:  [92 %-97 %] 93 %  BP: (116-150)/(56-73) 116/56         Physical Exam   Constitutional: She is oriented to person, place, and time. She appears well-developed and well-nourished.   HENT:   Head: Normocephalic and atraumatic.   Eyes: Pupils are equal, round, and reactive to light. EOM are normal.   Neck: Normal range of motion. Neck supple.   Cardiovascular: Normal rate and regular rhythm.   Pulmonary/Chest: Effort normal and breath sounds normal.   Abdominal: Soft. Bowel sounds are normal.   Musculoskeletal:   Right groin wound with healthy appearing base   Neurological: She is alert and oriented to person, place, and time.   Nursing note and vitals reviewed.      Significant Labs:  CBC:   Recent Labs   Lab 04/16/19 0421   WBC 6.23   RBC 3.55*   HGB 8.8*   HCT 28.0*      MCV 79*   MCH 24.8*   MCHC 31.4*     CMP:   Recent Labs   Lab 04/16/19  0421   *   CALCIUM 8.8   *   K 4.5   CO2 21*      BUN 19   CREATININE 1.6*       Significant Diagnostics:  I have reviewed all pertinent imaging results/findings within the past 24 hours.

## 2019-04-16 NOTE — PLAN OF CARE
Patient expected to discharge with home health and is requesting [facility]. SW sent a referral to [facility] and will send Home Health Orders when available.    Kayli Sam LMSW  [facility]   [id number]

## 2019-04-16 NOTE — ASSESSMENT & PLAN NOTE
R iliofemoral bypass (03/20/'19) with skin dehiscence of R groin inc at superior and mid aspect, no graft exposure or fascial dehiscence, 2+ DP with resolution of claudication and ischemic rest pain, s/p washout right groin and wound vac placement (04/12/'19)    - Awaiting OR cultures to result.  Will transition to PO abx prior to d/c  - d/c wound vac, wet to dry dressings BID  - dispo pending home health    Dispo: Anticipate discharge as early as tomorrow

## 2019-04-16 NOTE — PLAN OF CARE
[facility]    HOME HEALTH ORDERS  FACE TO FACE ENCOUNTER    Patient Name: Nelsy Marino  YOB: 1940    PCP: Juan F Garay   PCP Address: [address]  PCP Phone Number: [phone]  PCP Fax: [phone]    Discharging Team(s): Data Unavailable    Encounter Date: 04/16/2019    Admit to Home Health    Diagnoses:  Active Hospital Problems    Diagnosis  POA    *Wound dehiscence [T81.30XA]  Yes    Postoperative seroma of musculoskeletal structure after non-musculoskeletal procedure [M96.843]  Yes    Fluid collection at surgical site [T88.8XXA]  Yes    Cellulitis [L03.90]  No      Resolved Hospital Problems   No resolved problems to display.       Future Appointments   Date Time Provider Department Center   4/18/2019  3:00 PM Danny Dunbar MD [facility] VAS [facility]   4/24/2019  9:00 AM VASCULAR ULTRASOUND, [facility] EKG [facility]   4/24/2019 10:00 AM WB CT1 LIMIT 400 LBS [facility] CT SCAN [facility]   4/24/2019 10:30 AM VASCULAR ULTRASOUND, [facility] EKG [facility]   4/25/2019 10:40 AM Danny Dunbar MD [facility]     Follow-up Information     Juan F Garay.    Why:  Outpatient Services  Contact information:  [address] [phone]                     I have seen and examined this patient face to face today. My clinical findings that support the need for the home health skilled services and home bound status are the following:  Medical restrictions requiring assistance of another human to leave home due to  Wound care needs.    Allergies:  Review of patient's allergies indicates:   Allergen Reactions    Motrin [ibuprofen] Itching       Diet: diabetic diet: 2000 calorie    Activities: activity as tolerated    Nursing:   SN to complete comprehensive assessment including routine vital signs. Instruct on disease process and s/s of complications to report to MD. Review/verify  "medication list sent home with the patient at time of discharge  and instruct patient/caregiver as needed. Frequency may be adjusted depending on start of care date.    Notify MD if SBP > 160 or < 90; DBP > 90 or < 50; HR > 120 or < 50; Temp > 101; Other:   Purulent drainage from right groin wound, surrounding erythema, increased pain or induration        WOUND CARE ORDERS  yes:  Surgical Wound:  Location: Right groin    Consult ET nurse        Apply the following to wound:   Wet to Dry dressing: Daily       Medications: Review discharge medications with patient and family and provide education.      Current Discharge Medication List      CONTINUE these medications which have NOT CHANGED    Details   amlodipine (NORVASC) 10 MG tablet Take 10 mg by mouth once daily.      aspirin (ECOTRIN) 81 MG EC tablet Take 81 mg by mouth once daily.      cilostazol (PLETAL) 50 MG Tab Take 1 tablet (50 mg total) by mouth 2 (two) times daily. If patient tolerate medication after 4 weeks, increase dose to 100 mg twice daily.  Qty: 60 tablet, Refills: 11      glipiZIDE (GLUCOTROL) 10 MG tablet Take 10 mg by mouth 2 (two) times daily before meals.      !! insulin aspart protamine-insulin aspart (NOVOLOG 70/30) 100 unit/mL (70-30) InPn pen Inject 45 Units into the skin every morning.       !! insulin aspart protamine-insulin aspart (NOVOLOG MIX 70-30FLEXPEN U-100) 100 unit/mL (70-30) InPn pen Inject 40 Units into the skin every evening.      losartan-hydrochlorothiazide 100-25 mg (HYZAAR) 100-25 mg per tablet Take 1 tablet by mouth once daily.      !! oxyCODONE-acetaminophen (PERCOCET) 5-325 mg per tablet Take 1 tablet by mouth every 6 (six) hours as needed.  Qty: 30 tablet, Refills: 0      BLOOD SUGAR DIAGNOSTIC (TRUE METRIX GLUCOSE TEST STRIP MISC) by Misc.(Non-Drug; Combo Route) route.      insulin syringe-needle,dispos. 0.3 mL 30 gauge x 5/16" Syrg by Misc.(Non-Drug; Combo Route) route.      metformin (GLUCOPHAGE) 1000 MG tablet " Take 1,000 mg by mouth 2 (two) times daily with meals.      !! oxyCODONE-acetaminophen (PERCOCET) 5-325 mg per tablet Take 1 tablet by mouth every 6 (six) hours as needed for Pain.  Qty: 15 tablet, Refills: 0       !! - Potential duplicate medications found. Please discuss with provider.          I certify that this patient is confined to her home and needs intermittent skilled nursing care.

## 2019-04-16 NOTE — PLAN OF CARE
Problem: Adult Inpatient Plan of Care  Goal: Plan of Care Review  Outcome: Ongoing (interventions implemented as appropriate)  AAO x 4. Pt has a R groin incision with wound vac. RLQ transverse incision, staples intact. Pt is up to bathroom with assistance, walks frequently. 2000 ADA diet, tolerating well, no complaints of N/V overnight. Room air, VSS. Accuchecks ACHS, had to cover with 1 unit of insulin at bedtime. Pt complained of pain overnight, received PRN medications. Pt remained free of falls overnight. POC reviewed with pt who verbalized understanding. Bed in low position, call light in reach. No signs of distress or concerns noted at this time. WCTM.

## 2019-04-16 NOTE — PROGRESS NOTES
[facility]  Vascular Surgery  Progress Note    Patient Name: Nelsy Marino  MRN: [id number]  Admission Date: 4/11/2019  Primary Care Provider: Juan F Garay    Subjective:     Interval History: NAEON.  Afebrile.  No leukocytosis     Post-Op Info:  Procedure(s) (LRB):  Incision and Drainage - R groin washout, possible muscle flap (Right)   4 Days Post-Op       Medications:  Continuous Infusions:  Scheduled Meds:   amLODIPine  10 mg Oral Daily    aspirin  81 mg Oral Daily    cilostazol  50 mg Oral BID    losartan-hydrochlorothiazide 100-25 mg  1 tablet Oral Daily    pantoprazole  40 mg Oral BID AC    piperacillin-tazobactam (ZOSYN) IVPB  4.5 g Intravenous Q8H    polyethylene glycol  17 g Oral Daily     PRN Meds:dextrose 50%, dextrose 50%, glucagon (human recombinant), glucose, glucose, HYDROmorphone, insulin aspart U-100, ondansetron, oxyCODONE, oxyCODONE, sodium chloride 0.9%     Objective:     Vital Signs (Most Recent):  Temp: 98.6 °F (37 °C) (04/16/19 0458)  Pulse: 108 (04/16/19 0458)  Resp: 18 (04/16/19 0458)  BP: (!) 116/56 (04/16/19 0458)  SpO2: (!) 93 % (04/16/19 0458) Vital Signs (24h Range):  Temp:  [97.2 °F (36.2 °C)-99.1 °F (37.3 °C)] 98.6 °F (37 °C)  Pulse:  [] 108  Resp:  [16-20] 18  SpO2:  [92 %-97 %] 93 %  BP: (116-150)/(56-73) 116/56         Physical Exam   Constitutional: She is oriented to person, place, and time. She appears well-developed and well-nourished.   HENT:   Head: Normocephalic and atraumatic.   Eyes: Pupils are equal, round, and reactive to light. EOM are normal.   Neck: Normal range of motion. Neck supple.   Cardiovascular: Normal rate and regular rhythm.   Pulmonary/Chest: Effort normal and breath sounds normal.   Abdominal: Soft. Bowel sounds are normal.   Musculoskeletal:   Right groin wound with healthy appearing base   Neurological: She is alert and oriented to person, place, and time.   Nursing note and vitals reviewed.      Significant  Labs:  CBC:   Recent Labs   Lab 04/16/19  0421   WBC 6.23   RBC 3.55*   HGB 8.8*   HCT 28.0*      MCV 79*   MCH 24.8*   MCHC 31.4*     CMP:   Recent Labs   Lab 04/16/19  0421   *   CALCIUM 8.8   *   K 4.5   CO2 21*      BUN 19   CREATININE 1.6*       Significant Diagnostics:  I have reviewed all pertinent imaging results/findings within the past 24 hours.    Assessment/Plan:     * Wound dehiscence  R iliofemoral bypass (03/20/'19) with skin dehiscence of R groin inc at superior and mid aspect, no graft exposure or fascial dehiscence, 2+ DP with resolution of claudication and ischemic rest pain, s/p washout right groin and wound vac placement (04/12/'19)    - Awaiting OR cultures to result.  Will transition to PO abx prior to d/c  - d/c wound vac, wet to dry dressings BID  - dispo pending home health    Dispo: Anticipate discharge as early as tomorrow                 Cuong Smiley MD  Vascular Surgery  [facility]

## 2019-04-16 NOTE — PLAN OF CARE
Problem: Adult Inpatient Plan of Care  Goal: Plan of Care Review  Outcome: Ongoing (interventions implemented as appropriate)  POC reviewed with patient who verbalized understanding.  Patient VSS on room air.  Patient AAOX4, able to participate in her care.  Patient tolerated diet, denies nausea.  Pain controlled with PRN medications per MAR. Telemetry being monitored running NSR Educated on IS use. No acute events. No distress noted. Bed in lowest position, call light within reach, frequent rounds made for safety. Remains free of falls and injury.  Will continue to monitor.

## 2019-04-17 VITALS
SYSTOLIC BLOOD PRESSURE: 144 MMHG | DIASTOLIC BLOOD PRESSURE: 67 MMHG | BODY MASS INDEX: 30.82 KG/M2 | OXYGEN SATURATION: 96 % | WEIGHT: 167.5 LBS | HEIGHT: 62 IN | HEART RATE: 107 BPM | RESPIRATION RATE: 16 BRPM | TEMPERATURE: 99 F

## 2019-04-17 LAB
ANION GAP SERPL CALC-SCNC: 7 MMOL/L (ref 8–16)
BASOPHILS # BLD AUTO: 0.01 K/UL (ref 0–0.2)
BASOPHILS NFR BLD: 0.2 % (ref 0–1.9)
BUN SERPL-MCNC: 20 MG/DL (ref 8–23)
CALCIUM SERPL-MCNC: 8.8 MG/DL (ref 8.7–10.5)
CHLORIDE SERPL-SCNC: 107 MMOL/L (ref 95–110)
CO2 SERPL-SCNC: 21 MMOL/L (ref 23–29)
CREAT SERPL-MCNC: 1.4 MG/DL (ref 0.5–1.4)
DIFFERENTIAL METHOD: ABNORMAL
EOSINOPHIL # BLD AUTO: 0.4 K/UL (ref 0–0.5)
EOSINOPHIL NFR BLD: 6.2 % (ref 0–8)
ERYTHROCYTE [DISTWIDTH] IN BLOOD BY AUTOMATED COUNT: 14.4 % (ref 11.5–14.5)
EST. GFR  (AFRICAN AMERICAN): 41.5 ML/MIN/1.73 M^2
EST. GFR  (NON AFRICAN AMERICAN): 36 ML/MIN/1.73 M^2
GLUCOSE SERPL-MCNC: 166 MG/DL (ref 70–110)
HCT VFR BLD AUTO: 26.5 % (ref 37–48.5)
HGB BLD-MCNC: 8.3 G/DL (ref 12–16)
IMM GRANULOCYTES # BLD AUTO: 0.01 K/UL (ref 0–0.04)
IMM GRANULOCYTES NFR BLD AUTO: 0.2 % (ref 0–0.5)
LYMPHOCYTES # BLD AUTO: 1.1 K/UL (ref 1–4.8)
LYMPHOCYTES NFR BLD: 18.5 % (ref 18–48)
MAGNESIUM SERPL-MCNC: 1.5 MG/DL (ref 1.6–2.6)
MCH RBC QN AUTO: 25.5 PG (ref 27–31)
MCHC RBC AUTO-ENTMCNC: 31.3 G/DL (ref 32–36)
MCV RBC AUTO: 81 FL (ref 82–98)
MONOCYTES # BLD AUTO: 0.7 K/UL (ref 0.3–1)
MONOCYTES NFR BLD: 11.6 % (ref 4–15)
NEUTROPHILS # BLD AUTO: 3.7 K/UL (ref 1.8–7.7)
NEUTROPHILS NFR BLD: 63.3 % (ref 38–73)
NRBC BLD-RTO: 0 /100 WBC
PHOSPHATE SERPL-MCNC: 2.6 MG/DL (ref 2.7–4.5)
PLATELET # BLD AUTO: 266 K/UL (ref 150–350)
PMV BLD AUTO: 10.4 FL (ref 9.2–12.9)
POCT GLUCOSE: 220 MG/DL (ref 70–110)
POCT GLUCOSE: 312 MG/DL (ref 70–110)
POTASSIUM SERPL-SCNC: 4.3 MMOL/L (ref 3.5–5.1)
RBC # BLD AUTO: 3.26 M/UL (ref 4–5.4)
SODIUM SERPL-SCNC: 135 MMOL/L (ref 136–145)
WBC # BLD AUTO: 5.77 K/UL (ref 3.9–12.7)

## 2019-04-17 PROCEDURE — 99499 NO LOS: ICD-10-PCS | Mod: ,,, | Performed by: PHYSICIAN ASSISTANT

## 2019-04-17 PROCEDURE — 99223 1ST HOSP IP/OBS HIGH 75: CPT | Mod: ,,, | Performed by: INTERNAL MEDICINE

## 2019-04-17 PROCEDURE — 84100 ASSAY OF PHOSPHORUS: CPT

## 2019-04-17 PROCEDURE — 25000003 PHARM REV CODE 250: Performed by: SURGERY

## 2019-04-17 PROCEDURE — 36415 COLL VENOUS BLD VENIPUNCTURE: CPT

## 2019-04-17 PROCEDURE — 80048 BASIC METABOLIC PNL TOTAL CA: CPT

## 2019-04-17 PROCEDURE — 25000003 PHARM REV CODE 250: Performed by: STUDENT IN AN ORGANIZED HEALTH CARE EDUCATION/TRAINING PROGRAM

## 2019-04-17 PROCEDURE — 99223 PR INITIAL HOSPITAL CARE,LEVL III: ICD-10-PCS | Mod: ,,, | Performed by: INTERNAL MEDICINE

## 2019-04-17 PROCEDURE — 63600175 PHARM REV CODE 636 W HCPCS: Performed by: STUDENT IN AN ORGANIZED HEALTH CARE EDUCATION/TRAINING PROGRAM

## 2019-04-17 PROCEDURE — 85025 COMPLETE CBC W/AUTO DIFF WBC: CPT

## 2019-04-17 PROCEDURE — 83735 ASSAY OF MAGNESIUM: CPT

## 2019-04-17 PROCEDURE — 99499 UNLISTED E&M SERVICE: CPT | Mod: ,,, | Performed by: PHYSICIAN ASSISTANT

## 2019-04-17 RX ORDER — LEVOFLOXACIN 500 MG/1
500 TABLET, FILM COATED ORAL EVERY OTHER DAY
Qty: 7 TABLET | Refills: 0 | Status: SHIPPED | OUTPATIENT
Start: 2019-04-17 | End: 2019-04-29 | Stop reason: SDUPTHER

## 2019-04-17 RX ADMIN — PANTOPRAZOLE SODIUM 40 MG: 40 TABLET, DELAYED RELEASE ORAL at 05:04

## 2019-04-17 RX ADMIN — INSULIN ASPART 4 UNITS: 100 INJECTION, SOLUTION INTRAVENOUS; SUBCUTANEOUS at 04:04

## 2019-04-17 RX ADMIN — PIPERACILLIN AND TAZOBACTAM 4.5 G: 4; .5 INJECTION, POWDER, LYOPHILIZED, FOR SOLUTION INTRAVENOUS; PARENTERAL at 02:04

## 2019-04-17 RX ADMIN — INSULIN ASPART 2 UNITS: 100 INJECTION, SOLUTION INTRAVENOUS; SUBCUTANEOUS at 07:04

## 2019-04-17 RX ADMIN — PIPERACILLIN AND TAZOBACTAM 4.5 G: 4; .5 INJECTION, POWDER, LYOPHILIZED, FOR SOLUTION INTRAVENOUS; PARENTERAL at 05:04

## 2019-04-17 RX ADMIN — OXYCODONE HYDROCHLORIDE 10 MG: 10 TABLET ORAL at 10:04

## 2019-04-17 RX ADMIN — PANTOPRAZOLE SODIUM 40 MG: 40 TABLET, DELAYED RELEASE ORAL at 04:04

## 2019-04-17 RX ADMIN — OXYCODONE HYDROCHLORIDE 10 MG: 10 TABLET ORAL at 04:04

## 2019-04-17 RX ADMIN — CILOSTAZOL 50 MG: 50 TABLET ORAL at 10:04

## 2019-04-17 RX ADMIN — ASPIRIN 81 MG: 81 TABLET, COATED ORAL at 10:04

## 2019-04-17 RX ADMIN — AMLODIPINE BESYLATE 10 MG: 10 TABLET ORAL at 10:04

## 2019-04-17 RX ADMIN — OXYCODONE HYDROCHLORIDE 10 MG: 10 TABLET ORAL at 06:04

## 2019-04-17 NOTE — PLAN OF CARE
Problem: Adult Inpatient Plan of Care  Goal: Plan of Care Review  Outcome: Ongoing (interventions implemented as appropriate)  AAO x 4. Pt has a R groin incision with wound vac. RLQ transverse incision, staples intact. Pt is up to bedside commode with assistance, walks frequently. 2000 ADA diet, tolerating well, no complaints of N/V overnight. VSS on room air. Accuchecks ACHS, had to cover with 1 unit of insulin at bedtime. Pt complained of pain overnight, received PRN medications. Pt remained free of falls overnight. POC reviewed with pt who verbalized understanding. Bed in low position, call light in reach. No signs of distress or concerns noted at this time. WCTM.

## 2019-04-17 NOTE — HPI
"77 y/o pleasant female with HTN, DMII, hx of DVT, PVD, fem-fem bypass 20 years ago, underwent R iliofemoral bypass 3/20/19 presents with right groin pain and dehiscence by home health nurse. She denies having any fever chills or night sweats. She was found to have right groin wound dehiscence. She underwent CT scan which showed Surgical changes with right iliac femoral bypass graft which is patent.Fluid collection measuring 5.9 x 3.1 x 2.9 cm overlying the anastomosis in the inguinal region extending superficially to the staple line.  There appears to be some enhancement of the wall of this fluid collection.  While this may represent a postoperative hematoma or seroma, abscess cannot be excluded.    She underwent washout on 4/12. Per op note " The patient's dehisced wound was gently debrided. We noted no erythema of the surrounding skin. A small area at the base of the inferior wound was opened, and a moderate amount of serous fluid was removed. Cultures were sent. There was no purulent fluid or material in the wound. We then expressed as much fluid as possible, applying pressure from all sides. It was then irrigated with 3 L of bacitracin solution with a pulse vac. A wound sponge and dressing were applied, with good suction. "     She is currently on zosyn. She is afebrile, stable, without a leukocytosis. Wounds appear well with healthy granulated tissue. Staples in place. No drainage seen. Non tender to palpation.   "

## 2019-04-17 NOTE — ASSESSMENT & PLAN NOTE
"79 y/o pleasant female with HTN, DMII, hx of DVT, PVD underwent R iliofemoral bypass 3/20/19 presents with right groin pain and dehiscence by home health nurse. She underwent washout on 4/12. Wound cultures +Proteus. Surgical cultures +genital damir. We are consulted for abx recommendations.     CT scan showed Surgical changes with right iliac femoral bypass graft which is patent.Fluid collection measuring 5.9 x 3.1 x 2.9 cm overlying the anastomosis in the inguinal region extending superficially to the staple line.  There appears to be some enhancement of the wall of this fluid collection.     Per op note " The patient's dehisced wound was gently debrided. We noted no erythema of the surrounding skin. A small area at the base of the inferior wound was opened, and a moderate amount of serous fluid was removed. Cultures were sent. There was no purulent fluid or material in the wound. "     She is currently on zosyn. She is afebrile, stable, without a leukocytosis. Wounds appear well with healthy granulated tissue. Staples in place. No drainage seen. Non tender to palpation.     1. Continue current abx therapy. May discharge patient on levofloxacin 500mg po q 48hr for skin/soft tissue infection based on culture data   2. Anticipate 14 days of abx therapy  3. Will need close f/u with ID once discharged  4. Continue local wound care per primary team.     Will sign off. Please call if with questions.     "

## 2019-04-17 NOTE — SUBJECTIVE & OBJECTIVE
"Past Medical History:   Diagnosis Date    CHF (congestive heart failure)     Diabetes mellitus     DVT (deep venous thrombosis)     Hypertension     Miscarriage        Past Surgical History:   Procedure Laterality Date     femoral vascular surgery       CREATION, BYPASS, ARTERIAL, ILIAC TO FEMORAL, RIGHT LOWER EXTREMITY, RIGHT PROFUNDAPLASTY Right 3/20/2019    Performed by Danny [facility]bar [facility] at [facility] OR    HYSTERECTOMY      ?unsure cervix present    Incision and Drainage - R groin washout, possible muscle flap Right 4/12/2019    Performed by Danny [facility]bar MD at [facility] OR [address] [place] [address]       Review of patient's allergies indicates:   Allergen Reactions    Motrin [ibuprofen] Itching       Medications:  Medications Prior to Admission   Medication Sig    amlodipine (NORVASC) 10 MG tablet Take 10 mg by mouth once daily.    aspirin (ECOTRIN) 81 MG EC tablet Take 81 mg by mouth once daily.    cilostazol (PLETAL) 50 MG Tab Take 1 tablet (50 mg total) by mouth 2 (two) times daily. If patient tolerate medication after 4 weeks, increase dose to 100 mg twice daily.    glipiZIDE (GLUCOTROL) 10 MG tablet Take 10 mg by mouth 2 (two) times daily before meals.    insulin aspart protamine-insulin aspart (NOVOLOG 70/30) 100 unit/mL (70-30) InPn pen Inject 45 Units into the skin every morning.     insulin aspart protamine-insulin aspart (NOVOLOG MIX 70-30FLEXPEN U-100) 100 unit/mL (70-30) InPn pen Inject 40 Units into the skin every evening.    losartan-hydrochlorothiazide 100-25 mg (HYZAAR) 100-25 mg per tablet Take 1 tablet by mouth once daily.    oxyCODONE-acetaminophen (PERCOCET) 5-325 mg per tablet Take 1 tablet by mouth every 6 (six) hours as needed.    BLOOD SUGAR DIAGNOSTIC (TRUE METRIX GLUCOSE TEST STRIP MISC) by Misc.(Non-Drug; Combo Route) route.    insulin syringe-needle,dispos. 0.3 mL 30 gauge x 5/16" Syrg by Misc.(Non-Drug; Combo Route) route.    metformin (GLUCOPHAGE) 1000 MG tablet Take 1,000 mg " by mouth 2 (two) times daily with meals.    oxyCODONE-acetaminophen (PERCOCET) 5-325 mg per tablet Take 1 tablet by mouth every 6 (six) hours as needed for Pain.     Antibiotics (From admission, onward)    Start     Stop Route Frequency Ordered    190  piperacillin-tazobactam 4.5 g in sodium chloride 0.9% 100 mL IVPB (ready to mix system)      -- IV Every 8 hours (non-standard times) 19 2153        Antifungals (From admission, onward)    None        Antivirals (From admission, onward)    None             There is no immunization history on file for this patient.    Family History     Problem Relation (Age of Onset)    Diabetes Father    Hypertension Father        Social History     Socioeconomic History    Marital status:      Spouse name: Not on file    Number of children: Not on file    Years of education: Not on file    Highest education level: Not on file   Occupational History    Not on file   Social Needs    Financial resource strain: Not on file    Food insecurity:     Worry: Not on file     Inability: Not on file    Transportation needs:     Medical: Not on file     Non-medical: Not on file   Tobacco Use    Smoking status: Former Smoker     Last attempt to quit: 1980     Years since quittin.3    Smokeless tobacco: Never Used   Substance and Sexual Activity    Alcohol use: No    Drug use: No    Sexual activity: Never   Lifestyle    Physical activity:     Days per week: Not on file     Minutes per session: Not on file    Stress: Not on file   Relationships    Social connections:     Talks on phone: Not on file     Gets together: Not on file     Attends Catholic service: Not on file     Active member of club or organization: Not on file     Attends meetings of clubs or organizations: Not on file     Relationship status: Not on file   Other Topics Concern    Not on file   Social History Narrative    Not on file     Review of Systems   Constitutional: Negative for  chills, diaphoresis, fatigue and fever.   Respiratory: Negative for cough and shortness of breath.    Gastrointestinal: Negative for abdominal pain, diarrhea, nausea and vomiting.   Genitourinary: Negative for difficulty urinating and dysuria.   Musculoskeletal: Negative for back pain.   Skin: Positive for wound. Negative for color change, pallor and rash.   Neurological: Negative for dizziness and headaches.   All other systems reviewed and are negative.    Objective:     Vital Signs (Most Recent):  Temp: 97.7 °F (36.5 °C) (04/17/19 1241)  Pulse: (!) 114 (04/17/19 1241)  Resp: 16 (04/17/19 1011)  BP: (!) 149/65 (04/17/19 1241)  SpO2: 96 % (04/17/19 1241) Vital Signs (24h Range):  Temp:  [97.7 °F (36.5 °C)-99.4 °F (37.4 °C)] 97.7 °F (36.5 °C)  Pulse:  [] 114  Resp:  [12-16] 16  SpO2:  [92 %-96 %] 96 %  BP: (118-149)/(58-74) 149/65     Weight: 76 kg (167 lb 8 oz)  Body mass index is 30.64 kg/m².    Estimated Creatinine Clearance: 31.6 mL/min (based on SCr of 1.4 mg/dL).    Physical Exam   Constitutional: She is oriented to person, place, and time. She appears well-developed and well-nourished. No distress.       HENT:   Head: Normocephalic.   Eyes: Pupils are equal, round, and reactive to light.   Cardiovascular: Normal rate, regular rhythm and normal heart sounds. Exam reveals no friction rub.   No murmur heard.  Pulmonary/Chest: Effort normal. No stridor. No respiratory distress. She has no wheezes. She has no rales.   Abdominal: Soft. She exhibits no distension. There is no tenderness. There is no guarding.   Musculoskeletal: Normal range of motion. She exhibits no edema, tenderness or deformity.   Neurological: She is alert and oriented to person, place, and time. No cranial nerve deficit.   Skin: Skin is warm and dry. She is not diaphoretic. No erythema. No pallor.   Psychiatric: She has a normal mood and affect.   Vitals reviewed.      Significant Labs:   Blood Culture:   Recent Labs   Lab 04/11/19 2043  04/11/19 2044   LABBLOO No growth after 5 days. No growth after 5 days.     CBC:   Recent Labs   Lab 04/16/19  0421 04/17/19  0434   WBC 6.23 5.77   HGB 8.8* 8.3*   HCT 28.0* 26.5*    266     CMP:   Recent Labs   Lab 04/16/19  0421 04/16/19  1521 04/17/19  0434   * 132* 135*   K 4.5 4.8 4.3    103 107   CO2 21* 23 21*   * 220* 166*   BUN 19 20 20   CREATININE 1.6* 1.5* 1.4   CALCIUM 8.8 8.7 8.8   ANIONGAP 9 6* 7*   EGFRNONAA 30.6* 33.1* 36.0*     Urine Culture: No results for input(s): LABURIN in the last 4320 hours.  Urine Studies:   Recent Labs   Lab 04/12/19  0033   COLORU Yellow   APPEARANCEUA Clear   PHUR 5.0   SPECGRAV >=1.030*   PROTEINUA Negative   GLUCUA Negative   KETONESU Negative   BILIRUBINUA Negative   OCCULTUA Negative   NITRITE Negative   LEUKOCYTESUR Trace*   RBCUA 3   WBCUA 2   SQUAMEPITHEL 11     Wound Culture:   Recent Labs   Lab 04/11/19 2045 04/12/19  1741   LABAERO PROTEUS MIRABILIS  Many  Skin damir also present   Genital damir, no predominant organism     All pertinent labs within the past 24 hours have been reviewed.    Significant Imaging: I have reviewed all pertinent imaging results/findings within the past 24 hours.

## 2019-04-17 NOTE — ASSESSMENT & PLAN NOTE
R iliofemoral bypass (03/20/'19) with skin dehiscence of R groin inc at superior and mid aspect, no graft exposure or fascial dehiscence, 2+ DP with resolution of claudication and ischemic rest pain, s/p washout right groin and wound vac placement (04/12/'19)    - Regular diet  - BID dressing changes, will need HH  - Plan on d/c with PO abx after ID eval, consulted    Dispo: Anticipate discharge after ID eval

## 2019-04-17 NOTE — CONSULTS
"[facility]  Infectious Disease  Consult Note    Patient Name: Nelsy Marino  MRN: [id number]  Admission Date: 4/11/2019  Hospital Length of Stay: 6 days  Attending Physician: Danny Dunbar MD  Primary Care Provider: Juan F Garay     Isolation Status: No active isolations    Consults  Assessment/Plan:     * Wound dehiscence  77 y/o pleasant female with HTN, DMII, hx of DVT, PVD underwent R iliofemoral bypass 3/20/19 presents with right groin pain and dehiscence by home health nurse. She underwent washout on 4/12. Wound cultures +Proteus. Surgical cultures +genital damir. We are consulted for abx recommendations.     CT scan showed Surgical changes with right iliac femoral bypass graft which is patent.Fluid collection measuring 5.9 x 3.1 x 2.9 cm overlying the anastomosis in the inguinal region extending superficially to the staple line.  There appears to be some enhancement of the wall of this fluid collection.     Per op note " The patient's dehisced wound was gently debrided. We noted no erythema of the surrounding skin. A small area at the base of the inferior wound was opened, and a moderate amount of serous fluid was removed. Cultures were sent. There was no purulent fluid or material in the wound. "     She is currently on zosyn. She is afebrile, stable, without a leukocytosis. Wounds appear well with healthy granulated tissue. Staples in place. No drainage seen. Non tender to palpation.     1. Continue current abx therapy. May discharge patient on levofloxacin 500mg po q 48hr for skin/soft tissue infection based on culture data   2. Anticipate 14 days of abx therapy  3. Will need close f/u with ID once discharged  4. Continue local wound care per primary team.     Will sign off. Please call if with questions.         Thank you for your consult. I will sign off. Please contact us if you have any additional questions.    Ward Garza PA-C  Infectious Disease  [facility] " "[facility]  [phone]    Subjective:     Principal Problem: Wound dehiscence    HPI: 77 y/o pleasant female with HTN, DMII, hx of DVT, PVD, fem-fem bypass 20 years ago, underwent R iliofemoral bypass 3/20/19 presents with right groin pain and dehiscence by home health nurse. She denies having any fever chills or night sweats. She was found to have right groin wound dehiscence. She underwent CT scan which showed Surgical changes with right iliac femoral bypass graft which is patent.Fluid collection measuring 5.9 x 3.1 x 2.9 cm overlying the anastomosis in the inguinal region extending superficially to the staple line.  There appears to be some enhancement of the wall of this fluid collection.  While this may represent a postoperative hematoma or seroma, abscess cannot be excluded.    She underwent washout on 4/12. Per op note " The patient's dehisced wound was gently debrided. We noted no erythema of the surrounding skin. A small area at the base of the inferior wound was opened, and a moderate amount of serous fluid was removed. Cultures were sent. There was no purulent fluid or material in the wound. We then expressed as much fluid as possible, applying pressure from all sides. It was then irrigated with 3 L of bacitracin solution with a pulse vac. A wound sponge and dressing were applied, with good suction.  "     She is currently on zosyn. She is afebrile, stable, without a leukocytosis. Wounds appear well with healthy granulated tissue. Staples in place. No drainage seen. Non tender to palpation.     Past Medical History:   Diagnosis Date    CHF (congestive heart failure)     Diabetes mellitus     DVT (deep venous thrombosis)     Hypertension     Miscarriage        Past Surgical History:   Procedure Laterality Date     femoral vascular surgery       CREATION, BYPASS, ARTERIAL, ILIAC TO FEMORAL, RIGHT LOWER EXTREMITY, RIGHT PROFUNDAPLASTY Right 3/20/2019    Performed by [facility] at [facility] " "OR    HYSTERECTOMY      ?unsure cervix present    Incision and Drainage - R groin washout, possible muscle flap Right 4/12/2019    Performed by Danny [facility]bar MD at [facility] OR [address] [place] [address]       Review of patient's allergies indicates:   Allergen Reactions    Motrin [ibuprofen] Itching       Medications:  Medications Prior to Admission   Medication Sig    amlodipine (NORVASC) 10 MG tablet Take 10 mg by mouth once daily.    aspirin (ECOTRIN) 81 MG EC tablet Take 81 mg by mouth once daily.    cilostazol (PLETAL) 50 MG Tab Take 1 tablet (50 mg total) by mouth 2 (two) times daily. If patient tolerate medication after 4 weeks, increase dose to 100 mg twice daily.    glipiZIDE (GLUCOTROL) 10 MG tablet Take 10 mg by mouth 2 (two) times daily before meals.    insulin aspart protamine-insulin aspart (NOVOLOG 70/30) 100 unit/mL (70-30) InPn pen Inject 45 Units into the skin every morning.     insulin aspart protamine-insulin aspart (NOVOLOG MIX 70-30FLEXPEN U-100) 100 unit/mL (70-30) InPn pen Inject 40 Units into the skin every evening.    losartan-hydrochlorothiazide 100-25 mg (HYZAAR) 100-25 mg per tablet Take 1 tablet by mouth once daily.    oxyCODONE-acetaminophen (PERCOCET) 5-325 mg per tablet Take 1 tablet by mouth every 6 (six) hours as needed.    BLOOD SUGAR DIAGNOSTIC (TRUE METRIX GLUCOSE TEST STRIP MISC) by Misc.(Non-Drug; Combo Route) route.    insulin syringe-needle,dispos. 0.3 mL 30 gauge x 5/16" Syrg by Misc.(Non-Drug; Combo Route) route.    metformin (GLUCOPHAGE) 1000 MG tablet Take 1,000 mg by mouth 2 (two) times daily with meals.    oxyCODONE-acetaminophen (PERCOCET) 5-325 mg per tablet Take 1 tablet by mouth every 6 (six) hours as needed for Pain.     Antibiotics (From admission, onward)    Start     Stop Route Frequency Ordered    04/11/19 2230  piperacillin-tazobactam 4.5 g in sodium chloride 0.9% 100 mL IVPB (ready to mix system)      -- IV Every 8 hours (non-standard times) 04/11/19 2153    "     Antifungals (From admission, onward)    None        Antivirals (From admission, onward)    None             There is no immunization history on file for this patient.    Family History     Problem Relation (Age of Onset)    Diabetes Father    Hypertension Father        Social History     Socioeconomic History    Marital status:      Spouse name: Not on file    Number of children: Not on file    Years of education: Not on file    Highest education level: Not on file   Occupational History    Not on file   Social Needs    Financial resource strain: Not on file    Food insecurity:     Worry: Not on file     Inability: Not on file    Transportation needs:     Medical: Not on file     Non-medical: Not on file   Tobacco Use    Smoking status: Former Smoker     Last attempt to quit:      Years since quittin.3    Smokeless tobacco: Never Used   Substance and Sexual Activity    Alcohol use: No    Drug use: No    Sexual activity: Never   Lifestyle    Physical activity:     Days per week: Not on file     Minutes per session: Not on file    Stress: Not on file   Relationships    Social connections:     Talks on phone: Not on file     Gets together: Not on file     Attends Yarsanism service: Not on file     Active member of club or organization: Not on file     Attends meetings of clubs or organizations: Not on file     Relationship status: Not on file   Other Topics Concern    Not on file   Social History Narrative    Not on file     Review of Systems   Constitutional: Negative for chills, diaphoresis, fatigue and fever.   Respiratory: Negative for cough and shortness of breath.    Gastrointestinal: Negative for abdominal pain, diarrhea, nausea and vomiting.   Genitourinary: Negative for difficulty urinating and dysuria.   Musculoskeletal: Negative for back pain.   Skin: Positive for wound. Negative for color change, pallor and rash.   Neurological: Negative for dizziness and headaches.    All other systems reviewed and are negative.    Objective:     Vital Signs (Most Recent):  Temp: 97.7 °F (36.5 °C) (04/17/19 1241)  Pulse: (!) 114 (04/17/19 1241)  Resp: 16 (04/17/19 1011)  BP: (!) 149/65 (04/17/19 1241)  SpO2: 96 % (04/17/19 1241) Vital Signs (24h Range):  Temp:  [97.7 °F (36.5 °C)-99.4 °F (37.4 °C)] 97.7 °F (36.5 °C)  Pulse:  [] 114  Resp:  [12-16] 16  SpO2:  [92 %-96 %] 96 %  BP: (118-149)/(58-74) 149/65     Weight: 76 kg (167 lb 8 oz)  Body mass index is 30.64 kg/m².    Estimated Creatinine Clearance: 31.6 mL/min (based on SCr of 1.4 mg/dL).    Physical Exam   Constitutional: She is oriented to person, place, and time. She appears well-developed and well-nourished. No distress.       HENT:   Head: Normocephalic.   Eyes: Pupils are equal, round, and reactive to light.   Cardiovascular: Normal rate, regular rhythm and normal heart sounds. Exam reveals no friction rub.   No murmur heard.  Pulmonary/Chest: Effort normal. No stridor. No respiratory distress. She has no wheezes. She has no rales.   Abdominal: Soft. She exhibits no distension. There is no tenderness. There is no guarding.   Musculoskeletal: Normal range of motion. She exhibits no edema, tenderness or deformity.   Neurological: She is alert and oriented to person, place, and time. No cranial nerve deficit.   Skin: Skin is warm and dry. She is not diaphoretic. No erythema. No pallor.   Psychiatric: She has a normal mood and affect.   Vitals reviewed.      Significant Labs:   Blood Culture:   Recent Labs   Lab 04/11/19 2043 04/11/19 2044   LABBLOO No growth after 5 days. No growth after 5 days.     CBC:   Recent Labs   Lab 04/16/19  0421 04/17/19  0434   WBC 6.23 5.77   HGB 8.8* 8.3*   HCT 28.0* 26.5*    266     CMP:   Recent Labs   Lab 04/16/19  0421 04/16/19  1521 04/17/19  0434   * 132* 135*   K 4.5 4.8 4.3    103 107   CO2 21* 23 21*   * 220* 166*   BUN 19 20 20   CREATININE 1.6* 1.5* 1.4   CALCIUM  8.8 8.7 8.8   ANIONGAP 9 6* 7*   EGFRNONAA 30.6* 33.1* 36.0*     Urine Culture: No results for input(s): LABURIN in the last 4320 hours.  Urine Studies:   Recent Labs   Lab 04/12/19  0033   COLORU Yellow   APPEARANCEUA Clear   PHUR 5.0   SPECGRAV >=1.030*   PROTEINUA Negative   GLUCUA Negative   KETONESU Negative   BILIRUBINUA Negative   OCCULTUA Negative   NITRITE Negative   LEUKOCYTESUR Trace*   RBCUA 3   WBCUA 2   SQUAMEPITHEL 11     Wound Culture:   Recent Labs   Lab 04/11/19  2045 04/12/19  1741   LABAERO PROTEUS MIRABILIS  Many  Skin damir also present   Genital damir, no predominant organism     All pertinent labs within the past 24 hours have been reviewed.    Significant Imaging: I have reviewed all pertinent imaging results/findings within the past 24 hours.

## 2019-04-17 NOTE — CONSULTS
[facility]  Infectious Disease  Consult Note    Patient Name: Nelsy Marino  MRN: [id number]  Admission Date: 4/11/2019  Hospital Length of Stay: 6 days  Attending Physician: Danny Dunbar MD  Primary Care Provider: [facility]     Isolation Status: No active isolations      Inpatient consult to Infectious Diseases  Consult performed by: Ward Garza PA-C  Consult ordered by: Jayde Rodriguez MD  Reason for consult: positive culture after bypass graft          ID consult received. Chart being reviewed. Full note with recommendations to follow.    Thank you,  Ward Garza PA-C  [phone]

## 2019-04-17 NOTE — SUBJECTIVE & OBJECTIVE
Medications:  Continuous Infusions:  Scheduled Meds:   amLODIPine  10 mg Oral Daily    aspirin  81 mg Oral Daily    cilostazol  50 mg Oral BID    pantoprazole  40 mg Oral BID AC    piperacillin-tazobactam (ZOSYN) IVPB  4.5 g Intravenous Q8H    polyethylene glycol  17 g Oral Daily     PRN Meds:dextrose 50%, dextrose 50%, glucagon (human recombinant), glucose, glucose, HYDROmorphone, insulin aspart U-100, ondansetron, oxyCODONE, oxyCODONE, sodium chloride 0.9%     Objective:     Vital Signs (Most Recent):  Temp: 99.4 °F (37.4 °C) (04/17/19 0501)  Pulse: 98 (04/17/19 0501)  Resp: 16 (04/17/19 0501)  BP: (!) 118/58 (04/17/19 0501)  SpO2: 95 % (04/17/19 0501) Vital Signs (24h Range):  Temp:  [98.3 °F (36.8 °C)-99.4 °F (37.4 °C)] 99.4 °F (37.4 °C)  Pulse:  [] 98  Resp:  [12-16] 16  SpO2:  [91 %-95 %] 95 %  BP: (118-147)/(58-74) 118/58         Physical Exam   Constitutional: She is oriented to person, place, and time. She appears well-developed and well-nourished.   HENT:   Head: Normocephalic and atraumatic.   Eyes: Pupils are equal, round, and reactive to light. EOM are normal.   Neck: Normal range of motion. Neck supple.   Cardiovascular: Normal rate and regular rhythm.   Pulmonary/Chest: Effort normal and breath sounds normal.   Abdominal: Soft. Bowel sounds are normal.   Musculoskeletal:   Right groin wound with healthy appearing base   Neurological: She is alert and oriented to person, place, and time.   Nursing note and vitals reviewed.      Significant Labs:  CBC:   Recent Labs   Lab 04/17/19 0434   WBC 5.77   RBC 3.26*   HGB 8.3*   HCT 26.5*      MCV 81*   MCH 25.5*   MCHC 31.3*     CMP:   Recent Labs   Lab 04/17/19 0434   *   CALCIUM 8.8   *   K 4.3   CO2 21*      BUN 20   CREATININE 1.4       Significant Diagnostics:  I have reviewed all pertinent imaging results/findings within the past 24 hours.

## 2019-04-17 NOTE — PROGRESS NOTES
[facility]  Vascular Surgery  Progress Note    Patient Name: Nelsy Marino  MRN: [id number]  Admission Date: 4/11/2019  Primary Care Provider: Juan F Garay    Subjective:     Interval History: NAEON.  VSS AF    Post-Op Info:  Procedure(s) (LRB):  Incision and Drainage - R groin washout, possible muscle flap (Right)   5 Days Post-Op       Medications:  Continuous Infusions:  Scheduled Meds:   amLODIPine  10 mg Oral Daily    aspirin  81 mg Oral Daily    cilostazol  50 mg Oral BID    pantoprazole  40 mg Oral BID AC    piperacillin-tazobactam (ZOSYN) IVPB  4.5 g Intravenous Q8H    polyethylene glycol  17 g Oral Daily     PRN Meds:dextrose 50%, dextrose 50%, glucagon (human recombinant), glucose, glucose, HYDROmorphone, insulin aspart U-100, ondansetron, oxyCODONE, oxyCODONE, sodium chloride 0.9%     Objective:     Vital Signs (Most Recent):  Temp: 99.4 °F (37.4 °C) (04/17/19 0501)  Pulse: 98 (04/17/19 0501)  Resp: 16 (04/17/19 0501)  BP: (!) 118/58 (04/17/19 0501)  SpO2: 95 % (04/17/19 0501) Vital Signs (24h Range):  Temp:  [98.3 °F (36.8 °C)-99.4 °F (37.4 °C)] 99.4 °F (37.4 °C)  Pulse:  [] 98  Resp:  [12-16] 16  SpO2:  [91 %-95 %] 95 %  BP: (118-147)/(58-74) 118/58         Physical Exam   Constitutional: She is oriented to person, place, and time. She appears well-developed and well-nourished.   HENT:   Head: Normocephalic and atraumatic.   Eyes: Pupils are equal, round, and reactive to light. EOM are normal.   Neck: Normal range of motion. Neck supple.   Cardiovascular: Normal rate and regular rhythm.   Pulmonary/Chest: Effort normal and breath sounds normal.   Abdominal: Soft. Bowel sounds are normal.   Musculoskeletal:   Right groin wound with healthy appearing base   Neurological: She is alert and oriented to person, place, and time.   Nursing note and vitals reviewed.      Significant Labs:  CBC:   Recent Labs   Lab 04/17/19  0434   WBC 5.77   RBC 3.26*   HGB 8.3*   HCT 26.5*       MCV 81*   MCH 25.5*   MCHC 31.3*     CMP:   Recent Labs   Lab 04/17/19  0434   *   CALCIUM 8.8   *   K 4.3   CO2 21*      BUN 20   CREATININE 1.4       Significant Diagnostics:  I have reviewed all pertinent imaging results/findings within the past 24 hours.    Assessment/Plan:     * Wound dehiscence  R iliofemoral bypass (03/20/'19) with skin dehiscence of R groin inc at superior and mid aspect, no graft exposure or fascial dehiscence, 2+ DP with resolution of claudication and ischemic rest pain, s/p washout right groin and wound vac placement (04/12/'19)    - Regular diet  - BID dressing changes, will need HH  - Plan on d/c with PO abx after ID eval, consulted    Dispo: Anticipate discharge after ID eval                Cuong Smiley MD  Vascular Surgery  [facility]

## 2019-04-24 ENCOUNTER — HOSPITAL ENCOUNTER (OUTPATIENT)
Dept: CARDIOLOGY | Facility: HOSPITAL | Age: 79
Discharge: HOME | End: 2019-04-24
Attending: SURGERY
Payer: MEDICARE

## 2019-04-24 ENCOUNTER — HOSPITAL ENCOUNTER (OUTPATIENT)
Dept: RADIOLOGY | Facility: HOSPITAL | Age: 79
Discharge: HOME | End: 2019-04-24
Attending: SURGERY
Payer: MEDICARE

## 2019-04-24 DIAGNOSIS — I73.9 PVD (PERIPHERAL VASCULAR DISEASE): ICD-10-CM

## 2019-04-24 DIAGNOSIS — I73.9 VASCULAR CLAUDICATION: ICD-10-CM

## 2019-04-24 PROCEDURE — 93922 UPR/L XTREMITY ART 2 LEVELS: CPT | Mod: 50

## 2019-04-24 PROCEDURE — 93925 LOWER EXTREMITY STUDY: CPT | Mod: 26,,, | Performed by: SURGERY

## 2019-04-24 PROCEDURE — 75635 CTA RUNOFF ABD PEL BILAT LOWER EXT: ICD-10-PCS | Mod: 26,,, | Performed by: RADIOLOGY

## 2019-04-24 PROCEDURE — 93925 LOWER EXTREMITY STUDY: CPT | Mod: 50

## 2019-04-24 PROCEDURE — 75635 CT ANGIO ABDOMINAL ARTERIES: CPT | Mod: TC

## 2019-04-24 PROCEDURE — 25500020 PHARM REV CODE 255: Performed by: SURGERY

## 2019-04-24 PROCEDURE — 93922 CV US ANKLE BRACHIAL INDICES WO STRESS W TOE TRACINGS (CUPID ONLY): ICD-10-PCS | Mod: 26,,, | Performed by: SURGERY

## 2019-04-24 PROCEDURE — 75635 CT ANGIO ABDOMINAL ARTERIES: CPT | Mod: 26,,, | Performed by: RADIOLOGY

## 2019-04-24 PROCEDURE — 93922 UPR/L XTREMITY ART 2 LEVELS: CPT | Mod: 26,,, | Performed by: SURGERY

## 2019-04-24 PROCEDURE — 93925 CV US DOPPLER ARTERIAL LEGS BILATERAL (CUPID ONLY): ICD-10-PCS | Mod: 26,,, | Performed by: SURGERY

## 2019-04-24 RX ADMIN — IOHEXOL 100 ML: 350 INJECTION, SOLUTION INTRAVENOUS at 10:04

## 2019-04-25 LAB
LEFT ABI: 1.66
LEFT ANT TIBIAL SYS PSV: 47 CM/S
LEFT ARM BP: 152 MMHG
LEFT CFA PSV: 207 CM/S
LEFT DORSALIS PEDIS: 254 MMHG
LEFT EXTERNAL ILIAC PSV: 0 CM/S
LEFT PERONEAL SYS PSV: 41 CM/S
LEFT POPLITEAL PSV: 71 CM/S
LEFT POST TIBIAL SYS PSV: 57 CM/S
LEFT POSTERIOR TIBIAL: 254 MMHG
LEFT PROFUNDA SYS PSV: 53 CM/S
LEFT SUPER FEMORAL DIST SYS PSV: 73 CM/S
LEFT SUPER FEMORAL MID SYS PSV: 194 CM/S
LEFT SUPER FEMORAL OSTIAL SYS PSV: 208 CM/S
LEFT SUPER FEMORAL PROX SYS PSV: 113 CM/S
LEFT TBI: 0.59
LEFT TIB/PER TRUNK SYS PSV: 43 CM/S
LEFT TOE PRESSURE: 91 MMHG
OHS CV RIGHT ABI LOWER EXTREMITY (NO CALC): 0.92
RIGHT ABI: 0.92
RIGHT ANT TIBIAL SYS PSV: 26 CM/S
RIGHT ARM BP: 153 MMHG
RIGHT CFA PSV: 140 CM/S
RIGHT DIST ANA PSV: 91 CM/S
RIGHT DIST GRAFT PSV: 99 CM/S
RIGHT DORSALIS PEDIS: 140 MMHG
RIGHT MID GRAFT PSV: 94 CM/S
RIGHT OUTFLOW PSV: 140 CM/S
RIGHT PERONEAL SYS PSV: 38 CM/S
RIGHT POPLITEAL PSV: 38 CM/S
RIGHT POST TIBIAL SYS PSV: 40 CM/S
RIGHT POSTERIOR TIBIAL: 91 MMHG
RIGHT PROFUNDA SYS PSV: 84 CM/S
RIGHT PROX ANA PSV: 109 CM/S
RIGHT PROX GRAFT PSV: 91 CM/S
RIGHT SUPER FEMORAL DIST SYS PSV: 38 CM/S
RIGHT SUPER FEMORAL MID SYS PSV: 89 CM/S
RIGHT SUPER FEMORAL OSTIAL SYS PSV: 120 CM/S
RIGHT SUPER FEMORAL PROX SYS PSV: 87 CM/S
RIGHT TBI: 0.33
RIGHT TIB/PER TRUNK SYS PSV: 41 CM/S
RIGHT TOE PRESSURE: 51 MMHG

## 2019-04-26 ENCOUNTER — TELEPHONE (OUTPATIENT)
Dept: VASCULAR SURGERY | Facility: CLINIC | Age: 79
End: 2019-04-26

## 2019-04-26 RX ORDER — OXYCODONE AND ACETAMINOPHEN 5; 325 MG/1; MG/1
1 TABLET ORAL EVERY 6 HOURS PRN
Qty: 30 TABLET | Refills: 0 | Status: ON HOLD | OUTPATIENT
Start: 2019-04-26 | End: 2019-05-04 | Stop reason: HOSPADM

## 2019-04-26 NOTE — TELEPHONE ENCOUNTER
----- Message from Merrill Epstein sent at 4/26/2019  9:41 AM CDT -----  Contact: Ashlyn nurse/[phone]  Type: RX Refill Request    Who Called: Ashlyn    Refill or New Rx:Refill    RX Name and Strength:oxyCODONE-acetaminophen (PERCOCET) 5-325 mg per tablet    Preferred Pharmacy with phone number:[facility]  [address] [address], [address] [phone] (Phone)  [phone] (Fax)    Local or Mail Order:Local    Ordering Provider:Dr. Dunbar    Would the patient rather a call back or a response via [facility]? Call back    Best Call Back Number:[phone]    Thank you    1000 Call to patient who stated when she was discharged from [facility] she was not given any pain meds. Explained that Dr. Dunbar was out of the office but would send him a message. Explained would call back when doctor responded. She stated understanding.

## 2019-04-29 ENCOUNTER — OFFICE VISIT (OUTPATIENT)
Dept: VASCULAR SURGERY | Facility: CLINIC | Age: 79
End: 2019-04-29
Payer: MEDICARE

## 2019-04-29 VITALS
HEIGHT: 62 IN | WEIGHT: 162.25 LBS | DIASTOLIC BLOOD PRESSURE: 56 MMHG | BODY MASS INDEX: 29.86 KG/M2 | HEART RATE: 86 BPM | SYSTOLIC BLOOD PRESSURE: 118 MMHG

## 2019-04-29 DIAGNOSIS — T81.31XD DEHISCENCE OF OPERATIVE WOUND, SUBSEQUENT ENCOUNTER: Primary | ICD-10-CM

## 2019-04-29 PROCEDURE — 99024 PR POST-OP FOLLOW-UP VISIT: ICD-10-PCS | Mod: S$GLB,,, | Performed by: SURGERY

## 2019-04-29 PROCEDURE — 99999 PR PBB SHADOW E&M-EST. PATIENT-LVL III: CPT | Mod: PBBFAC,,, | Performed by: SURGERY

## 2019-04-29 PROCEDURE — 99999 PR PBB SHADOW E&M-EST. PATIENT-LVL III: ICD-10-PCS | Mod: PBBFAC,,, | Performed by: SURGERY

## 2019-04-29 PROCEDURE — 99024 POSTOP FOLLOW-UP VISIT: CPT | Mod: S$GLB,,, | Performed by: SURGERY

## 2019-04-29 RX ORDER — LEVOFLOXACIN 500 MG/1
500 TABLET, FILM COATED ORAL EVERY OTHER DAY
Qty: 4 TABLET | Refills: 3 | Status: ON HOLD | OUTPATIENT
Start: 2019-04-29 | End: 2019-05-04 | Stop reason: HOSPADM

## 2019-04-29 NOTE — LETTER
April 29, 2019        [place] [address] [address] [facility] [address]             [facility] - Vascular Surgery  [address] [facility]  [facility] [address]  Phone: [phone]  Fax: [phone]   Patient: Nelsy Marino   MR Number: [id number]   YOB: 1940   Date of Visit: 4/29/2019       Dear Dr. Garay:    Thank you for referring Nelsy Marino to me for evaluation. Below are the relevant portions of my assessment and plan of care.            If you have questions, please do not hesitate to call me. I look forward to following Nelsy along with you.    Sincerely,      Danny Dunbar MD           CC  No Recipients

## 2019-04-29 NOTE — PROGRESS NOTES
Danny Dunbar MD [facility]                         04/29/2019    HPI:  Nelsy Marino is a 78 y.o. female with   Patient Active Problem List   Diagnosis    Lichen sclerosus et atrophicus    PVD (peripheral vascular disease)    Essential hypertension    Type 2 diabetes mellitus    Diastolic CHF, chronic    Obesity (BMI 30-39.9)    History of DVT (deep vein thrombosis)    Wound dehiscence    Fluid collection at surgical site    Cellulitis    Postoperative seroma of musculoskeletal structure after non-musculoskeletal procedure    being managed by PCP and specialists who is here today for evaluation of RLE pain.  She is s/p fem-fem approx 20 yrs ago without issues until recently.  Fem-fem that was performed for RLE ALI was noted to be occluded on US at [facility].  Was scheduled for an angiogram at [facility] although was unable to proceed due to insurance purposes.  Patient states location is R calf and thigh occurring for 2 mo.  Associated signs and symptoms include none.  Quality is throbbing and severity is 9/10 at worst.  Symptoms began 2 mo ago.  Alleviating factors include rest.  Worsening factors include ambulation.  Pt states RLE claudication x 1 block.  States it is lifestyle limiting.     no MI  no Stroke  Tobacco use: quit 35 yrs ago    1/14/19: cont to c/o RLE pain, 1/2 block calf claudication.  Intermittent rest pain, no tissue loss.  Compliant with Pletal, has not noticed significant improvement.    2/14/19: Cont to experience 1/2 block R calf claudication.  No wounds.      4/8/19: s/p R iliofemoral bypass 3/20/19.  C/o R groin inc dehiscence by [facility].  No F/C.  States RLE ischemic rest pain resolved, +ambulating better without issues.    4/11/19: Presents for further eval of R abundio inc dehiscence.  No F/C.    Interval history: No new issues, states took all of PO Abx earlier than rec duration of 2 weeks by ID, no F/C.  States wound healing  although has not used soap.  No RLE claudication or rest pain, no wounds to R foot.  States RLE pain resolved.    Past Medical History:   Diagnosis Date    CHF (congestive heart failure)     Diabetes mellitus     DVT (deep venous thrombosis)     Hypertension     Miscarriage      Past Surgical History:   Procedure Laterality Date     femoral vascular surgery       CREATION, BYPASS, ARTERIAL, ILIAC TO FEMORAL, RIGHT LOWER EXTREMITY, RIGHT PROFUNDAPLASTY Right 3/20/2019    [facility]formed by [facility]    HYSTERECTOMY      ?unsure cervix present    Incision and Drainage - R groin washout, possible muscle flap Right 2019    Performed by [facility] at [facility] OR [address] [place] [address]     Family History   Problem Relation Age of Onset    Diabetes Father     Hypertension Father      Social History     Socioeconomic History    Marital status:      Spouse name: Not on file    Number of children: Not on file    Years of education: Not on file    Highest education level: Not on file   Occupational History    Not on file   Social Needs    Financial resource strain: Not on file    Food insecurity:     Worry: Not on file     Inability: Not on file    Transportation needs:     Medical: Not on file     Non-medical: Not on file   Tobacco Use    Smoking status: Former Smoker     Last attempt to quit: 1980     Years since quittin.3    Smokeless tobacco: Never Used   Substance and Sexual Activity    Alcohol use: No    Drug use: No    Sexual activity: Never   Lifestyle    Physical activity:     Days per week: Not on file     Minutes per session: Not on file    Stress: Not on file   Relationships    Social connections:     Talks on phone: Not on file     Gets together: Not on file     Attends Nondenominational service: Not on file     Active member of club or organization: Not on file     Attends meetings of clubs or organizations: Not on file     Relationship status: Not on file   Other  "Topics Concern    Not on file   Social History Narrative    Not on file       Current Outpatient Medications:     amlodipine (NORVASC) 10 MG tablet, Take 10 mg by mouth once daily., Disp: , Rfl:     aspirin (ECOTRIN) 81 MG EC tablet, Take 81 mg by mouth once daily., Disp: , Rfl:     BLOOD SUGAR DIAGNOSTIC (TRUE METRIX GLUCOSE TEST STRIP MISC), by Misc.(Non-Drug; Combo Route) route., Disp: , Rfl:     cilostazol (PLETAL) 50 MG Tab, Take 1 tablet (50 mg total) by mouth 2 (two) times daily. If patient tolerate medication after 4 weeks, increase dose to 100 mg twice daily., Disp: 60 tablet, Rfl: 11    glipiZIDE (GLUCOTROL) 10 MG tablet, Take 10 mg by mouth 2 (two) times daily before meals., Disp: , Rfl:     insulin syringe-needle,dispos. 0.3 mL 30 gauge x 5/16" Syrg, by Misc.(Non-Drug; Combo Route) route., Disp: , Rfl:     losartan-hydrochlorothiazide 100-25 mg (HYZAAR) 100-25 mg per tablet, Take 1 tablet by mouth once daily., Disp: , Rfl:     metformin (GLUCOPHAGE) 1000 MG tablet, Take 1,000 mg by mouth 2 (two) times daily with meals., Disp: , Rfl:     oxyCODONE-acetaminophen (PERCOCET) 5-325 mg per tablet, Take 1 tablet by mouth every 6 (six) hours as needed for Pain., Disp: 30 tablet, Rfl: 0    insulin aspart protamine-insulin aspart (NOVOLOG 70/30) 100 unit/mL (70-30) InPn pen, Inject 45 Units into the skin every morning. , Disp: , Rfl:     insulin aspart protamine-insulin aspart (NOVOLOG MIX 70-30FLEXPEN U-100) 100 unit/mL (70-30) InPn pen, Inject 40 Units into the skin every evening., Disp: , Rfl:     levoFLOXacin (LEVAQUIN) 500 MG tablet, Take 1 tablet (500 mg total) by mouth every other day. for 8 days, Disp: 4 tablet, Rfl: 3    REVIEW OF SYSTEMS:  General: No fevers or chills; ENT: No sore throat; Allergy and Immunology: no persistent infections; Hematological and Lymphatic: No history of bleeding or easy bruising; Endocrine: negative; Respiratory: no cough, shortness of breath, or wheezing; " Cardiovascular: no chest pain or dyspnea on exertion; Gastrointestinal: no abdominal pain/back, change in bowel habits, or bloody stools; Genito-Urinary: no dysuria, trouble voiding, or hematuria; Musculoskeletal: negative; Neurological: no TIA or stroke symptoms; Psychiatric: no nervousness, anxiety or depression.    PHYSICAL EXAM:      Pulse: 86         General appearance:  Alert, well-appearing, and in no distress.  Oriented to person, place, and time                    Neurological: Normal speech, no focal findings noted; CN II - XII grossly intact. RLE with sensation to light touch, LLE with sensation to light touch.            Musculoskeletal: Digits/nail without cyanosis/clubbing.  Strength 5/5 BLE.                    Neck: Supple, no significant adenopathy                  Chest:  Clear to auscultation, no wheezes, rales or rhonchi, symmetric air entry. No use of accessory muscles               Cardiac: Normal rate and regular rhythm, S1 and S2 normal            Abdomen: Soft, nontender, nondistended, no masses or organomegaly, no hernia, +serous drainage from abd inc minimally without infection     No rebound tenderness noted; bowel sounds normal     R groin inc with dehiscence superiorly of skin and to mid aspect healing well with +granulation tissue, no graft exposure, abdominal inc healing well +staples without infection; minimal superficial induration to distal inc with skin boil and minimal purulence expressed, no purulence from wound, no surrounding erythema          Extremities:   2+ R femoral pulse, 2+ L femoral pulse     doppler+ R popliteal pulse, doppler+ L popliteal pulse     doppler+ R PT pulse, doppler+ L PT pulse     2+ R DP pulse, doppler+ L DP pulse     no RLE edema, no LLE edema    Skin: RLE without tissue loss; LLE without tissue loss    LAB RESULTS:  No results found for: CBC  Lab Results   Component Value Date    LABPROT 11.5 04/11/2019    INR 1.1 04/11/2019     Lab Results   Component  Value Date     (L) 04/17/2019    K 4.3 04/17/2019     04/17/2019    CO2 21 (L) 04/17/2019     (H) 04/17/2019    BUN 20 04/17/2019    CREATININE 1.4 04/17/2019    CALCIUM 8.8 04/17/2019    ANIONGAP 7 (L) 04/17/2019    EGFRNONAA 36.0 (A) 04/17/2019     Lab Results   Component Value Date    WBC 5.77 04/17/2019    RBC 3.26 (L) 04/17/2019    HGB 8.3 (L) 04/17/2019    HCT 26.5 (L) 04/17/2019    MCV 81 (L) 04/17/2019    MCH 25.5 (L) 04/17/2019    MCHC 31.3 (L) 04/17/2019    RDW 14.4 04/17/2019     04/17/2019    MPV 10.4 04/17/2019    GRAN 3.7 04/17/2019    GRAN 63.3 04/17/2019    LYMPH 1.1 04/17/2019    LYMPH 18.5 04/17/2019    MONO 0.7 04/17/2019    MONO 11.6 04/17/2019    EOS 0.4 04/17/2019    BASO 0.01 04/17/2019    EOSINOPHIL 6.2 04/17/2019    BASOPHIL 0.2 04/17/2019    DIFFMETHOD Automated 04/17/2019     .  Lab Results   Component Value Date    HGBA1C 9.7 (H) 03/20/2019       IMAGING:  All pertinent imaging has been reviewed and interpreted independently.    11/8/18: US showing occluded fem-fem, decreased velocity R CFA, HD significant stenosis R SFA    [facility] ARCHIE: 0.4/1    1/14/19: ARCHIE R 0.4/1.1, flat L toe waveform, R toe pressure 64 mmHg    2/2019: Stress test without evidence of ischemia.  Echo with normal function.    4/24/19:   ARCHIE   1. Right lower extremity pressures and waveforms indicate mild arterial occlusive disease.  2. Left lower extremity pressures and waveforms indicate no hemodynamically significant arterial occlusive disease.    BLE arterial US  1. Right lower extremity arterial ultrasound and pressures indicate no hemodynamically significant stenosis.  The right iliac artery to femoral artery bypass is patent without evidence of hemodynamically significant stenosis.    2. Left lower extremity arterial ultrasound shows an occluded left external iliac artery.  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    CTA runoff 4/24/2019:  Patient is postop right  iliac to femoral artery bypass.  There is some soft tissue stranding along the iliac graft similar to slightly increased compared to prior.  May be some narrowing at the origin of the graft.  Dilatation of the insertion may be related to surgical technique or pseudoaneurysm.  Correlation with operative procedure will be helpful.  There is decrease in the volume of fluid though a focal 1 cm collection does persist.  There is some increased soft tissue thickening about the insertion site as well as the skin.  Skin staples remain.    Occluded fem-fem bypass graft.    Significant vascular disease throughout the SFA and calf vessels bilaterally similar.    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    Cholelithiasis.    IMP/PLAN:  78 y.o. female with   Patient Active Problem List   Diagnosis    Lichen sclerosus et atrophicus    PVD (peripheral vascular disease)    Essential hypertension    Type 2 diabetes mellitus    Diastolic CHF, chronic    Obesity (BMI 30-39.9)    History of DVT (deep vein thrombosis)    Wound dehiscence    Fluid collection at surgical site    Cellulitis    Postoperative seroma of musculoskeletal structure after non-musculoskeletal procedure    being managed by PCP and specialists who is here today for evaluation of RLE claudication, lifestyle limiting.    -Recovering well s/p R iliofemoral bypass s/p wound washout and drainage of seroma with healing skin dehiscence of R groin inc at superior and mid aspect, no graft exposure or fascial dehiscence, 2+ DP with resolution of claudication and ischemic rest pain, now with healing wounds although min induration to distal aspect of incision with superficial skin infection with min purulence and improvement of fluid collection on repeat CTA without abscess noted - superficial skin fluid drained today without further/deeper purulence identified; Abx re-prescribed for complete course per ID recs, no fevers/chills   -recommend continuing  wet to dry dressing BID with normal saline to areas of dehiscence only keeping surrounding skin clean and dry; warm soapy water to groin inc daily; recommend daily HH RN visits  -Cont ASA, statin and Pletal  -Heart healthy lifestyle  -Cont smoking cessation  -RTC 1 week for wound check    I spent 20 minutes evaluating this patient and greater than 50% of the time was spent counseling, coordinator care and discussing the plan of care.  All questions were answered and patient stated understanding with agreement with the above treatment plan.    Danny Dunbar MD [facility]  Vascular and Endovascular Surgery

## 2019-04-29 NOTE — PATIENT INSTRUCTIONS
Understanding Peripheral Arterial Disease    Peripheral arteries deliver oxygen-rich blood to the tissues outside the heart. As you age, your arteries become stiffer and thicker. In addition, risk factors, such as smoking and high cholesterol, can damage the artery lining. This allows a buildup of fat and other materials (plaque) to form within the artery walls. The buildup of plaque narrows the space inside the artery and sometimes blocks blood flow. Peripheral arterial disease (PAD) happens when blood flow through the arteries is reduced because of plaque buildup. It often happens in the legs and feet, but can also happen elsewhere in the body. If this buildup happens in the a large artery in the neck (carotid artery), it can be a major contributor to stroke.  A healthy artery  An artery is a muscular tube that carries oxgen rich blood and nutrients from the heart to the rest of the body. It has a smooth lining and flexible walls that allow blood to pass freely. When active, muscles need more oxygen. This increases blood flow. Healthy arteries can adapt to meet this need.  A damaged artery    PAD begins when the lining of an artery is damaged. This is often because of risk factors, such as smoking, older age, or diabetes. Plaque then starts to form within the artery wall. At this stage, blood flows normally, so youre not likely to have symptoms.  A narrowed artery    If plaque continues to build up, the space inside the artery narrows. The artery walls become less able to expand. The artery still provides enough blood and oxygen to your muscles during rest. But when youre active, the increased demand for blood cant be met. As a result, your leg may cramp or ache when you walk.  A blocked artery    An artery can become blocked by plaque or by a blood clot lodged in a narrowed section. When this happens, oxygen cant reach the muscle below the blockage. Then you may feel pain when lying down or when you are not  active (rest pain). This type of pain is especially common at night when youre lying flat. In time, the affected tissue can die. This can lead to the loss of a toe or foot.  Date Last Reviewed: 5/1/2016 © 2000-2017 [employer]. [address] [place] [id number]. All rights reserved. This information is not intended as a substitute for professional medical care. Always follow your healthcare professional's instructions.        Peripheral Artery Disease (PAD)     Peripheral artery disease (PAD) occurs when the arteries that carry blood to the limbs are narrowed or blocked. This is usually due to a buildup of a fatty substance called plaque in the walls of the arteries.  PAD most often affects the arteries in the legs. When these arteries are narrowed or blocked, blood flow to the legs is reduced. This can cause leg and foot pain and other symptoms. If severe enough, reduced blood flow to the legs can lead to tissue death (gangrene) and the loss of a toe, foot, or leg. Having PAD also makes it more likely that arteries in other body areas are blocked. For instance, arteries that carry blood to the heart or brain may be affected. This raises the chances of heart attack and stroke.  Risk factors  Certain factors can make PAD more likely. They include:  · Smoking  · Diabetes  · High blood pressure  · Unhealthy cholesterol levels  · Obesity  · Inactive lifestyle  · Older age  · Family history of PAD  Symptoms  Many people with PAD have no symptoms. If symptoms do occur, they can include:  · Pain in the muscles of the calves, thighs or hips that gets worse with activity and better with rest (intermittent claudication)  · Achy, tired, or heavy feeling in the legs  · Weakness, numbness, tingling, or loss of feeling in the legs  · Changes in skin color of the legs  · Sores on the legs and feet  · Cold leg, feet, or toes  · Pain the feet or toes even when lying down (rest pain)  Home care  PAD is a  chronic (lifelong) condition. Treatment is focused on managing your condition and lowering your health risks. This may include doing the following:  · If you smoke, quit. This helps prevent further damage to your arteries and lowers your health risks. Ask your provider about medicines or products that can help you quit smoking. Also consider joining a stop-smoking program or support group.  · Be more active. This helps you lose weight and manage problems such as high blood pressure and unhealthy cholesterol levels. Start a walking program if advised to by your provider. Your provider may also help you form a safe exercise program that is right for your needs.  · Make healthy eating changes. This includes eating less fat, salt, and sugar.  · Take medicines for high blood pressure, unhealthy cholesterol levels, and diabetes as directed.  · Have your blood pressure and cholesterol levels checked as often as directed.  · If you have diabetes, try to keep your blood sugar well controlled. Test your blood sugar as directed.  · If you are overweight, talk to your provider about forming a weight-loss plan.  · Watch for cuts, scrapes, or open sores on your feet. Poor blood flow to the feet may slow healing and increase the risk of infection from these problems.   Follow-up care  Follow up with your healthcare provider, or as advised. If imaging tests such as ultrasound were done, they will be reviewed by a doctor. You will be told the results and any new findings that may affect your care.  When to seek medical advice   Call your healthcare provider right away if any of these occur:  · Sudden severe pain in the legs or feet  · Sudden cold, pale or blue color in the legs or feet  · Weakness or numbness in the legs or feet that worsens  · Any sore or wound in the legs or feet that wont heal  · Weak pulse in your legs or feet  Know the Signs of Heart Attack and Stroke  People with PAD are at high risk for heart attack and  stroke. Knowing the signs of these problems can help you protect your health and get help when you need it. Call 911 right away if you have any of the following:  Signs of a Heart Attack  · Chest discomfort, such as pain, aching, tightness, or pressure that lasts more than a few minutes, or that comes and goes  · Pain or discomfort in the arms, back, shoulders, neck, or jaw  · Shortness of breath  · Sweating (often a cold, clammy sweat)  · Nausea  · Lightheadedness  Signs of a Stroke  · Sudden numbness or weakness of the face, arms, or legs, especially on one side  · Sudden confusion or trouble speaking or understanding  · Sudden trouble seeing in one or both eyes  · Sudden trouble walking, dizziness, or loss of balance  · Sudden, severe headache with no known cause   Date Last Reviewed: 9/21/2015  © 8536-4663 [employer]. [address] [place] [address]. All rights reserved. This information is not intended as a substitute for professional medical care. Always follow your healthcare professional's instructions.        A Walking Program for Peripheral Arterial Disease (PAD)  Peripheral arterial disease (PAD) is a condition where the arteries in the legs are severely damaged. When you have PAD, walking can be painful. So you may start to walk less. Walking less makes your leg muscles weaker. It then becomes harder and more painful to walk. A walking program can break this cycle. This sheet gives you tips on how to get started.     Walking farther without pain  Exercise strengthens leg muscles that are out of shape. It also trains these muscles to work with less oxygen. This helps your leg muscles work better even with reduced blood flow to your legs. When you have PAD, a walking program can be helpful. Your program can:  · Let you walk longer and farther without claudication. This is an ache in your legs during exercise that goes away with rest.  · Let you do more and be more active  · Add to  your overall health and well-being  · Help you control your blood sugar and blood pressure  · Help you become healthier with no risk and at little or no cost  Getting started  Your local hospital, vascular center, or cardiac rehab center may have a special walking program for people with PAD. If so, this is your best option. But if you cant find a program, or its not covered by insurance, you can still walk on your own. Follow these steps at each session:  · Step 1. Start at a pace that lets you walk for 5 to 10 minutes before you start to feel claudication. This feeling is unpleasant, but it doesnt hurt you. Keep going until the pain makes you feel that you need to stop.  · Step 2. Stop and rest for 3 to 5 minutes, just long enough for the pain to go away. You can rest standing or sitting. (Some people like to bring along a cane or a lightweight folding chair.)  · Step 3. Again walk at a pace that lets you walk for 5 to 10 minutes more before you feel pain. This may be slower than your starting pace in step 1. Then rest again.  · Step 4. Repeat this process until youve walked for 45 minutes. This should be about 60 to 80 minutes total, including resting time. You may not be able to do a full 45 minutes at first. Do as much as you can, and increase your time as you improve.  Making the most of your program  · Walk at least 3 times a week. Take no more than 2 days off between sessions. If you stop walking, even for a week or two, you can lose the health benefits of your program.  · Find a good place to walk. A treadmill or a track may be better at first. That way, you wont run the risk of going too far and finding that you cant walk back. Be sure to have a place to walk indoors in bad weather, such as a gym or a mall.  · Wear shoes with sturdy, flexible soles. The heel should fit without slipping. You should have room to wiggle your toes.  · Keep track of how long you walk. A pedometer will show your daily  progress. It can also show how much farther you can walk as time goes by.  · Ask a friend to keep you company. You may enjoy walking with someone else. Or you may want to make your walking sessions a time to relax by yourself.  · Make it fun. Listen to music while you walk and rest. Walk in a favorite park. Get to know the people at the gym, or the folks that you pass on your route. While you rest, you can window-shop, read, or feed the birds. Do whatever will help you enjoy your exercise sessions.  Date Last Reviewed: 6/1/2016  © 3937-9983 [employer]. [address] [place] [address], [place] [address]. All rights reserved. This information is not intended as a substitute for professional medical care. Always follow your healthcare professional's instructions.

## 2019-04-30 NOTE — DISCHARGE SUMMARY
[facility]  DISCHARGE SUMMARY  Vascular Surgery      Admit Date:  4/11/2019    Discharge Date and Time:  4/17/2019    Attending Physician:  Danny Dunbar MD      Discharge Provider:  Cuong Smiley MD     Reason for Admission:  Wound dehiscence     Procedures Performed:  Procedure(s) (LRB):  Incision and Drainage - R groin washout, possible muscle flap (Right)    Hospital Course:  Please see the preoperative H&P and other available documentation for full details related to history prior to this admission.  Briefly, Nelsy Marino is a 78 y.o. female who was admitted following scheduled surgery for Wound dehiscence    Following a complete preoperative discussion of the risks and benefits of surgery with signed informed consent, the patient was taken to the operating room on 4/12/2019 and underwent the above stated procedures.  The patient tolerated surgery well and there were no complications.  Please see the operative report for full intraoperative findings and details.  Postoperatively, the patient did well and was transferred from the PACU to the floor in stable condition where they had a stable and uncomplicated hospital course.  Labs and vital signs remained stable and appropriate throughout course.  Diet was advanced as tolerated and the patient's pain was controlled on oral pain medications without problem.  Currently, the patient is doing well and is stable and appropriate for discharge home at this time.      Consults:  None.    Significant Diagnostic Studies:   No results for input(s): WBC, HGB, HCT, PLT, BAND, METAMYELOCYT, MYELOPCT, HGBA1C in the last 72 hours.No results for input(s): NA, K, CL, CO2, BUN, CREATININE, GLU, CALCIUM, CAION, MG, PHOS, AST, ALT, ALKPHOS, BILITOT, BILIDIR, PROT, ALBUMIN, PREALBUMIN, AMYLASE, LIPASE, CRP, HSCRP, SEDRATE, PROCAL in the last 72 hours.No results for input(s): INR, PTT, LABHEPA, LACTATE, TROPONINI, CPK, CPKMB, MB, BNP in the last 72 hours.No  results for input(s): PH, PCO2, PO2, HCO3 in the last 72 hours.      Final Diagnoses:   Principal Problem:  Wound dehiscence   Secondary Diagnoses:    Active Hospital Problems    Diagnosis  POA    *Wound dehiscence [T81.30XA]  Yes    Postoperative seroma of musculoskeletal structure after non-musculoskeletal procedure [M96.843]  Yes    Fluid collection at surgical site [T88.8XXA]  Yes    Cellulitis [L03.90]  No      Resolved Hospital Problems   No resolved problems to display.       Discharged Condition:  Good    Disposition:  Home or Self Care    Follow Up/Patient Instructions:     Medications:  Reconciled Home Medications:    Discharge Medication List as of 4/17/2019  6:31 PM      START taking these medications    Details   levoFLOXacin (LEVAQUIN) 500 MG tablet Take 1 tablet (500 mg total) by mouth every other day. for 14 days, Starting Wed 4/17/2019, Until Wed 5/1/2019, Normal         CONTINUE these medications which have NOT CHANGED    Details   amlodipine (NORVASC) 10 MG tablet Take 10 mg by mouth once daily., Historical Med      aspirin (ECOTRIN) 81 MG EC tablet Take 81 mg by mouth once daily., Historical Med      BLOOD SUGAR DIAGNOSTIC (TRUE METRIX GLUCOSE TEST STRIP MISC) by Misc.(Non-Drug; Combo Route) route., Historical Med      cilostazol (PLETAL) 50 MG Tab Take 1 tablet (50 mg total) by mouth 2 (two) times daily. If patient tolerate medication after 4 weeks, increase dose to 100 mg twice daily., Starting Thu 11/8/2018, Until Fri 11/8/2019, Normal      glipiZIDE (GLUCOTROL) 10 MG tablet Take 10 mg by mouth 2 (two) times daily before meals., Historical Med      !! insulin aspart protamine-insulin aspart (NOVOLOG 70/30) 100 unit/mL (70-30) InPn pen Inject 45 Units into the skin every morning. , Historical Med      !! insulin aspart protamine-insulin aspart (NOVOLOG MIX 70-30FLEXPEN U-100) 100 unit/mL (70-30) InPn pen Inject 40 Units into the skin every evening., Historical Med      insulin  "syringe-needle,dispos. 0.3 mL 30 gauge x 5/16" Syrg by Misc.(Non-Drug; Combo Route) route., Historical Med      losartan-hydrochlorothiazide 100-25 mg (HYZAAR) 100-25 mg per tablet Take 1 tablet by mouth once daily., Historical Med      metformin (GLUCOPHAGE) 1000 MG tablet Take 1,000 mg by mouth 2 (two) times daily with meals., Historical Med      !! oxyCODONE-acetaminophen (PERCOCET) 5-325 mg per tablet Take 1 tablet by mouth every 6 (six) hours as needed for Pain., Starting Mon 4/8/2019, Until Mon 4/22/2019, Normal      !! oxyCODONE-acetaminophen (PERCOCET) 5-325 mg per tablet Take 1 tablet by mouth every 6 (six) hours as needed., Starting Sun 3/24/2019, Until Wed 4/8/2020, Normal       !! - Potential duplicate medications found. Please discuss with provider.        Discharge Procedure Orders   Diet Adult Regular     Change dressing (specify)   Order Comments: BID wet to dry dressing change to right groin per home health orders     Notify your health care provider if you experience any of the following:  temperature >100.4     Notify your health care provider if you experience any of the following:  severe uncontrolled pain     Notify your health care provider if you experience any of the following:  redness, tenderness, or signs of infection (pain, swelling, redness, odor or green/yellow discharge around incision site)     Activity as tolerated     Follow-up Information     [facility].    Why:  Outpatient Services  Contact information:  [address] [id number]  [phone]             Danny Dunbar MD In 1 month.    Specialties:  Vascular Surgery, Surgery  Contact information:  [address]  [address]  [place] [id number] [phone]                   Cuong Smiley MD    "

## 2019-05-02 ENCOUNTER — ANESTHESIA EVENT (OUTPATIENT)
Dept: SURGERY | Facility: HOSPITAL | Age: 79
DRG: 580 | End: 2019-05-02
Payer: MEDICARE

## 2019-05-02 ENCOUNTER — OFFICE VISIT (OUTPATIENT)
Dept: VASCULAR SURGERY | Facility: CLINIC | Age: 79
DRG: 580 | End: 2019-05-02
Payer: MEDICARE

## 2019-05-02 ENCOUNTER — HOSPITAL ENCOUNTER (INPATIENT)
Facility: HOSPITAL | Age: 79
LOS: 2 days | Discharge: HOME | DRG: 580 | End: 2019-05-04
Attending: EMERGENCY MEDICINE | Admitting: SURGERY
Payer: MEDICARE

## 2019-05-02 VITALS
HEIGHT: 62 IN | DIASTOLIC BLOOD PRESSURE: 52 MMHG | SYSTOLIC BLOOD PRESSURE: 132 MMHG | WEIGHT: 163.38 LBS | HEART RATE: 80 BPM | BODY MASS INDEX: 30.07 KG/M2

## 2019-05-02 DIAGNOSIS — I73.9 PVD (PERIPHERAL VASCULAR DISEASE): Primary | Chronic | ICD-10-CM

## 2019-05-02 DIAGNOSIS — T81.31XD: Primary | ICD-10-CM

## 2019-05-02 DIAGNOSIS — L02.214 ABSCESS OF RIGHT GROIN: ICD-10-CM

## 2019-05-02 DIAGNOSIS — T88.8XXD FLUID COLLECTION AT SURGICAL SITE, SUBSEQUENT ENCOUNTER: ICD-10-CM

## 2019-05-02 LAB
ABO + RH BLD: NORMAL
ALBUMIN SERPL BCP-MCNC: 3.4 G/DL (ref 3.5–5.2)
ALP SERPL-CCNC: 71 U/L (ref 55–135)
ALT SERPL W/O P-5'-P-CCNC: 10 U/L (ref 10–44)
ANION GAP SERPL CALC-SCNC: 10 MMOL/L (ref 8–16)
AST SERPL-CCNC: 21 U/L (ref 10–40)
BASOPHILS # BLD AUTO: 0.03 K/UL (ref 0–0.2)
BASOPHILS NFR BLD: 0.4 % (ref 0–1.9)
BILIRUB SERPL-MCNC: 0.5 MG/DL (ref 0.1–1)
BILIRUB UR QL STRIP: NEGATIVE
BLD GP AB SCN CELLS X3 SERPL QL: NORMAL
BUN SERPL-MCNC: 24 MG/DL (ref 8–23)
BUN SERPL-MCNC: 25 MG/DL (ref 6–30)
CALCIUM SERPL-MCNC: 10.1 MG/DL (ref 8.7–10.5)
CHLORIDE SERPL-SCNC: 104 MMOL/L (ref 95–110)
CHLORIDE SERPL-SCNC: 105 MMOL/L (ref 95–110)
CLARITY UR REFRACT.AUTO: CLEAR
CO2 SERPL-SCNC: 23 MMOL/L (ref 23–29)
COLOR UR AUTO: YELLOW
CREAT SERPL-MCNC: 1.3 MG/DL (ref 0.5–1.4)
CREAT SERPL-MCNC: 1.3 MG/DL (ref 0.5–1.4)
DIFFERENTIAL METHOD: ABNORMAL
EOSINOPHIL # BLD AUTO: 0.2 K/UL (ref 0–0.5)
EOSINOPHIL NFR BLD: 3.2 % (ref 0–8)
ERYTHROCYTE [DISTWIDTH] IN BLOOD BY AUTOMATED COUNT: 14.9 % (ref 11.5–14.5)
EST. GFR  (AFRICAN AMERICAN): 45.4 ML/MIN/1.73 M^2
EST. GFR  (NON AFRICAN AMERICAN): 39.4 ML/MIN/1.73 M^2
GLUCOSE SERPL-MCNC: 121 MG/DL (ref 70–110)
GLUCOSE SERPL-MCNC: 184 MG/DL (ref 70–110)
GLUCOSE UR QL STRIP: NEGATIVE
HCT VFR BLD AUTO: 35.9 % (ref 37–48.5)
HCT VFR BLD CALC: 32 %PCV (ref 36–54)
HGB BLD-MCNC: 11 G/DL (ref 12–16)
HGB UR QL STRIP: NEGATIVE
IMM GRANULOCYTES # BLD AUTO: 0.01 K/UL (ref 0–0.04)
IMM GRANULOCYTES NFR BLD AUTO: 0.1 % (ref 0–0.5)
INR PPP: 1.1 (ref 0.8–1.2)
KETONES UR QL STRIP: NEGATIVE
LACTATE SERPL-SCNC: 1.4 MMOL/L (ref 0.5–2.2)
LEUKOCYTE ESTERASE UR QL STRIP: NEGATIVE
LYMPHOCYTES # BLD AUTO: 1.4 K/UL (ref 1–4.8)
LYMPHOCYTES NFR BLD: 18.3 % (ref 18–48)
MCH RBC QN AUTO: 24.3 PG (ref 27–31)
MCHC RBC AUTO-ENTMCNC: 30.6 G/DL (ref 32–36)
MCV RBC AUTO: 79 FL (ref 82–98)
MONOCYTES # BLD AUTO: 0.4 K/UL (ref 0.3–1)
MONOCYTES NFR BLD: 5.4 % (ref 4–15)
NEUTROPHILS # BLD AUTO: 5.5 K/UL (ref 1.8–7.7)
NEUTROPHILS NFR BLD: 72.6 % (ref 38–73)
NITRITE UR QL STRIP: NEGATIVE
NRBC BLD-RTO: 0 /100 WBC
PH UR STRIP: 5 [PH] (ref 5–8)
PLATELET # BLD AUTO: 354 K/UL (ref 150–350)
PMV BLD AUTO: 10.1 FL (ref 9.2–12.9)
POC IONIZED CALCIUM: 1.19 MMOL/L (ref 1.06–1.42)
POC TCO2 (MEASURED): 25 MMOL/L (ref 23–29)
POCT GLUCOSE: 137 MG/DL (ref 70–110)
POTASSIUM BLD-SCNC: 3.9 MMOL/L (ref 3.5–5.1)
POTASSIUM SERPL-SCNC: 4 MMOL/L (ref 3.5–5.1)
PROT SERPL-MCNC: 8.5 G/DL (ref 6–8.4)
PROT UR QL STRIP: NEGATIVE
PROTHROMBIN TIME: 11.4 SEC (ref 9–12.5)
RBC # BLD AUTO: 4.52 M/UL (ref 4–5.4)
SAMPLE: ABNORMAL
SODIUM BLD-SCNC: 141 MMOL/L (ref 136–145)
SODIUM SERPL-SCNC: 138 MMOL/L (ref 136–145)
SP GR UR STRIP: >=1.03 (ref 1–1.03)
URN SPEC COLLECT METH UR: ABNORMAL
WBC # BLD AUTO: 7.59 K/UL (ref 3.9–12.7)

## 2019-05-02 PROCEDURE — 25500020 PHARM REV CODE 255: Performed by: EMERGENCY MEDICINE

## 2019-05-02 PROCEDURE — 63600175 PHARM REV CODE 636 W HCPCS: Performed by: STUDENT IN AN ORGANIZED HEALTH CARE EDUCATION/TRAINING PROGRAM

## 2019-05-02 PROCEDURE — 99285 EMERGENCY DEPT VISIT HI MDM: CPT | Mod: ,,, | Performed by: EMERGENCY MEDICINE

## 2019-05-02 PROCEDURE — 81003 URINALYSIS AUTO W/O SCOPE: CPT

## 2019-05-02 PROCEDURE — 87040 BLOOD CULTURE FOR BACTERIA: CPT | Mod: 59

## 2019-05-02 PROCEDURE — 99999 PR PBB SHADOW E&M-EST. PATIENT-LVL III: CPT | Mod: PBBFAC,,, | Performed by: SURGERY

## 2019-05-02 PROCEDURE — 99285 PR EMERGENCY DEPT VISIT,LEVEL V: ICD-10-PCS | Mod: ,,, | Performed by: EMERGENCY MEDICINE

## 2019-05-02 PROCEDURE — 83605 ASSAY OF LACTIC ACID: CPT

## 2019-05-02 PROCEDURE — 82962 GLUCOSE BLOOD TEST: CPT

## 2019-05-02 PROCEDURE — 99024 POSTOP FOLLOW-UP VISIT: CPT | Mod: S$GLB,,, | Performed by: SURGERY

## 2019-05-02 PROCEDURE — 85610 PROTHROMBIN TIME: CPT

## 2019-05-02 PROCEDURE — 25000003 PHARM REV CODE 250: Performed by: STUDENT IN AN ORGANIZED HEALTH CARE EDUCATION/TRAINING PROGRAM

## 2019-05-02 PROCEDURE — 80053 COMPREHEN METABOLIC PANEL: CPT

## 2019-05-02 PROCEDURE — 96374 THER/PROPH/DIAG INJ IV PUSH: CPT

## 2019-05-02 PROCEDURE — 25000003 PHARM REV CODE 250: Performed by: EMERGENCY MEDICINE

## 2019-05-02 PROCEDURE — 99024 PR POST-OP FOLLOW-UP VISIT: ICD-10-PCS | Mod: S$GLB,,, | Performed by: SURGERY

## 2019-05-02 PROCEDURE — 20600001 HC STEP DOWN PRIVATE ROOM

## 2019-05-02 PROCEDURE — 86901 BLOOD TYPING SEROLOGIC RH(D): CPT

## 2019-05-02 PROCEDURE — 99999 PR PBB SHADOW E&M-EST. PATIENT-LVL III: ICD-10-PCS | Mod: PBBFAC,,, | Performed by: SURGERY

## 2019-05-02 PROCEDURE — 63600175 PHARM REV CODE 636 W HCPCS: Performed by: EMERGENCY MEDICINE

## 2019-05-02 PROCEDURE — 99285 EMERGENCY DEPT VISIT HI MDM: CPT | Mod: 25

## 2019-05-02 PROCEDURE — 85025 COMPLETE CBC W/AUTO DIFF WBC: CPT

## 2019-05-02 RX ORDER — SODIUM CHLORIDE 9 MG/ML
INJECTION, SOLUTION INTRAVENOUS CONTINUOUS
Status: DISCONTINUED | OUTPATIENT
Start: 2019-05-03 | End: 2019-05-04

## 2019-05-02 RX ORDER — SODIUM CHLORIDE 0.9 % (FLUSH) 0.9 %
10 SYRINGE (ML) INJECTION
Status: DISCONTINUED | OUTPATIENT
Start: 2019-05-02 | End: 2019-05-04 | Stop reason: HOSPADM

## 2019-05-02 RX ADMIN — IOHEXOL 100 ML: 350 INJECTION, SOLUTION INTRAVENOUS at 02:05

## 2019-05-02 RX ADMIN — SODIUM CHLORIDE: 0.9 INJECTION, SOLUTION INTRAVENOUS at 04:05

## 2019-05-02 RX ADMIN — PIPERACILLIN AND TAZOBACTAM 4.5 G: 4; .5 INJECTION, POWDER, LYOPHILIZED, FOR SOLUTION INTRAVENOUS; PARENTERAL at 04:05

## 2019-05-02 RX ADMIN — PIPERACILLIN AND TAZOBACTAM 4.5 G: 4; .5 INJECTION, POWDER, LYOPHILIZED, FOR SOLUTION INTRAVENOUS; PARENTERAL at 10:05

## 2019-05-02 NOTE — ANESTHESIA PREPROCEDURE EVALUATION
05/02/2019  [facility]  Anesthesia Pre-Operative Evaluation         Patient Name: Nelsy Marino  YOB: 1940  MRN: [id number]    SUBJECTIVE:     Pre-operative evaluation for Procedure(s) (LRB):  Incision and Drainage R groin (Right)     05/02/2019    Nelsy Marino is a 78 y.o. female w/ a significant PMHx of HTN, DM Type 2, CHF, DVT, PVD, Lichen sclerosus who presents with a boil.   Patient now presents for the above procedure(s).      LDA:        Peripheral IV - Single Lumen 05/02/19 1243 20 G Left Antecubital (Active)   Site Assessment Clean;Dry;Intact 5/2/2019 12:43 PM   Line Status Flushed;Saline locked;Blood return noted 5/2/2019 12:43 PM   Dressing Status Clean;Dry;Intact 5/2/2019 12:43 PM   Dressing Intervention New dressing 5/2/2019 12:43 PM   Number of days: 0       Prev airway: Method of Intubation: Direct laryngoscopy; Inserted by: CRNA; Airway Device: Endotracheal Tube; Mask Ventilation: Easy; Blade: Mendez #2; Airway Device Size: 7.5; Style: Cuffed; Cuff Inflation: Minimal occlusive pressure; Inflation Amount: 6; Placement Verified By: Auscultation, Capnometry; Grade: Grade I; Complicating Factors: None    Drips:    [START ON 5/3/2019] sodium chloride 0.9%         Patient Active Problem List   Diagnosis    Lichen sclerosus et atrophicus    PVD (peripheral vascular disease)    Essential hypertension    Type 2 diabetes mellitus    Diastolic CHF, chronic    Obesity (BMI 30-39.9)    History of DVT (deep vein thrombosis)    Wound dehiscence    Fluid collection at surgical site    Cellulitis    Postoperative seroma of musculoskeletal structure after non-musculoskeletal procedure       Review of patient's allergies indicates:   Allergen Reactions    Motrin [ibuprofen] Itching       Current Inpatient Medications:      No current facility-administered  "medications on file prior to encounter.      Current Outpatient Medications on File Prior to Encounter   Medication Sig Dispense Refill    amlodipine (NORVASC) 10 MG tablet Take 10 mg by mouth once daily.      aspirin (ECOTRIN) 81 MG EC tablet Take 81 mg by mouth once daily.      cilostazol (PLETAL) 50 MG Tab Take 1 tablet (50 mg total) by mouth 2 (two) times daily. If patient tolerate medication after 4 weeks, increase dose to 100 mg twice daily. 60 tablet 11    glipiZIDE (GLUCOTROL) 10 MG tablet Take 10 mg by mouth 2 (two) times daily before meals.      levoFLOXacin (LEVAQUIN) 500 MG tablet Take 1 tablet (500 mg total) by mouth every other day. for 8 days 4 tablet 3    losartan-hydrochlorothiazide 100-25 mg (HYZAAR) 100-25 mg per tablet Take 1 tablet by mouth once daily.      metformin (GLUCOPHAGE) 1000 MG tablet Take 1,000 mg by mouth 2 (two) times daily with meals.      oxyCODONE-acetaminophen (PERCOCET) 5-325 mg per tablet Take 1 tablet by mouth every 6 (six) hours as needed for Pain. 30 tablet 0    BLOOD SUGAR DIAGNOSTIC (TRUE METRIX GLUCOSE TEST STRIP MISC) by Misc.(Non-Drug; Combo Route) route.      insulin aspart protamine-insulin aspart (NOVOLOG 70/30) 100 unit/mL (70-30) InPn pen Inject 45 Units into the skin every morning.       insulin aspart protamine-insulin aspart (NOVOLOG MIX 70-30FLEXPEN U-100) 100 unit/mL (70-30) InPn pen Inject 40 Units into the skin every evening.      insulin syringe-needle,dispos. 0.3 mL 30 gauge x 5/16" Syrg by Misc.(Non-Drug; Combo Route) route.         Past Surgical History:   Procedure Laterality Date     femoral vascular surgery       CREATION, BYPASS, ARTERIAL, ILIAC TO FEMORAL, RIGHT LOWER EXTREMITY, RIGHT PROFUNDAPLASTY Right 3/20/2019    Performed by [facility]bar [facility] at [facility] OR    HYSTERECTOMY      ?unsure cervix present    Incision and Drainage - R groin washout, possible muscle flap Right 4/12/2019    Performed by Danny Dunbar MD at " [facility] OR [facility]ND [facility]       Social History     Socioeconomic History    Marital status:      Spouse name: Not on file    Number of children: Not on file    Years of education: Not on file    Highest education level: Not on file   Occupational History    Not on file   Social Needs    Financial resource strain: Not on file    Food insecurity:     Worry: Not on file     Inability: Not on file    Transportation needs:     Medical: Not on file     Non-medical: Not on file   Tobacco Use    Smoking status: Former Smoker     Last attempt to quit: 1980     Years since quittin.3    Smokeless tobacco: Never Used   Substance and Sexual Activity    Alcohol use: No    Drug use: No    Sexual activity: Never   Lifestyle    Physical activity:     Days per week: Not on file     Minutes per session: Not on file    Stress: Not on file   Relationships    Social connections:     Talks on phone: Not on file     Gets together: Not on file     Attends [employer] service: Not on file     Active member of club or organization: Not on file     Attends meetings of clubs or organizations: Not on file     Relationship status: Not on file   Other Topics Concern    Not on file   Social History Narrative    Not on file       OBJECTIVE:     Vital Signs Range (Last 24H):  Temp:  [36.7 °C (98 °F)]   Pulse:  [80]   Resp:  [16]   BP: (132-170)/(52-72)   SpO2:  [98 %]       Significant Labs:  Lab Results   Component Value Date    WBC 7.59 2019    HGB 11.0 (L) 2019    HCT 32 (L) 2019     (H) 2019    ALT 6 (L) 2019    AST 14 2019     (L) 2019    K 4.3 2019     2019    CREATININE 1.4 2019    BUN 20 2019    CO2 21 (L) 2019    INR 1.1 2019    HGBA1C 9.7 (H) 2019       Diagnostic Studies: No relevant studies.    EKG: No recent studies available.    2D ECHO:  No results found for this or any previous visit.      ASSESSMENT/PLAN:          Anesthesia Evaluation    I have reviewed the Patient Summary Reports.     I have reviewed the Medications.     Review of Systems  Anesthesia Hx:  No problems with previous Anesthesia  History of prior surgery of interest to airway management or planning: Denies Family Hx of Anesthesia complications.   Denies Personal Hx of Anesthesia complications.   Social:  Non-Smoker, No Alcohol Use, Former Smoker    Hematology/Oncology:     Oncology Normal    -- Anemia:   EENT/Dental:EENT/Dental Normal   Cardiovascular:   Hypertension Denies MI.    Denies Angina. CHF    Pulmonary:  Pulmonary Normal  Denies COPD.  Denies Asthma.    Renal/:  Renal/ Normal     Hepatic/GI:   GERD, well controlled    Musculoskeletal:  Musculoskeletal Normal    Neurological:  Neurology Normal  Denies CVA. Denies Seizures.    Endocrine:   Diabetes, poorly controlled, type 2    Dermatological:   Lichen sclerosus, boil       Physical Exam  General:  Well nourished    Airway/Jaw/Neck:  Airway Findings: Mouth Opening: Normal Tongue: Normal  General Airway Assessment: Adult  Mallampati: III  Improves to III with phonation.  TM Distance: Normal, at least 6 cm  Jaw/Neck Findings:  Neck ROM: Normal ROM  Neck Findings: Normal    Eyes/Ears/Nose:  EYES/EARS/NOSE FINDINGS: Normal   Dental:  Dental Findings: Edentulous, Upper Dentures, Lower Dentures   Chest/Lungs:  Chest/Lungs Findings: Clear to auscultation, Normal Respiratory Rate     Heart/Vascular:  Heart Findings: Rate: Normal  Rhythm: Regular Rhythm     Abdomen:  Abdomen Findings: Normal      Mental Status:  Mental Status Findings:  Cooperative, Alert and Oriented         Anesthesia Plan  Type of Anesthesia, risks & benefits discussed:  Anesthesia Type:  general  Patient's Preference: General  Intra-op Monitoring Plan: standard ASA monitors  Intra-op Monitoring Plan Comments:   Post Op Pain Control Plan: per primary service following discharge from PACU, IV/PO Opioids PRN and multimodal  analgesia  Post Op Pain Control Plan Comments:   Induction:   IV  Beta Blocker:  Patient is not currently on a Beta-Blocker (No further documentation required).       Informed Consent: Patient understands risks and agrees with Anesthesia plan.  Questions answered. Anesthesia consent signed with patient.  ASA Score: 3     Day of Surgery Review of History & Physical:    H&P update referred to the provider.     Anesthesia Plan Notes: Discussed plan for general endotracheal anesthesia, pt understands and agrees with plan        Ready For Surgery From Anesthesia Perspective.

## 2019-05-02 NOTE — SUBJECTIVE & OBJECTIVE
(Not in a hospital admission)    Review of patient's allergies indicates:   Allergen Reactions    Motrin [ibuprofen] Itching       Past Medical History:   Diagnosis Date    CHF (congestive heart failure)     Diabetes mellitus     DVT (deep venous thrombosis)     Hypertension     Miscarriage      Past Surgical History:   Procedure Laterality Date     femoral vascular surgery       CREATION, BYPASS, ARTERIAL, ILIAC TO FEMORAL, RIGHT LOWER EXTREMITY, RIGHT PROFUNDAPLASTY Right 3/20/2019    [facility] OR    HYSTERECTOMY      ?unsure cervix present    Incision and Drainage - R groin washout, possible muscle flap Right 2019    Performed by [facility] at [facility] OR [address] [place] [address]     Family History     Problem Relation (Age of Onset)    Diabetes Father    Hypertension Father        Tobacco Use    Smoking status: Former Smoker     Last attempt to quit: 1980     Years since quittin.3    Smokeless tobacco: Never Used   Substance and Sexual Activity    Alcohol use: No    Drug use: No    Sexual activity: Never     Review of Systems   All other systems reviewed and are negative.    Objective:     Vital Signs (Most Recent):  Temp: 98 °F (36.7 °C) (19 1104)  Pulse: 80 (19 1104)  Resp: 16 (19 1104)  BP: (!) 170/72 (19 1104)  SpO2: 98 % (19 1104) Vital Signs (24h Range):  Temp:  [98 °F (36.7 °C)] 98 °F (36.7 °C)  Pulse:  [80] 80  Resp:  [16] 16  SpO2:  [98 %] 98 %  BP: (132-170)/(52-72) 170/72     Weight: 73.9 kg (163 lb)  Body mass index is 31.83 kg/m².    Physical Exam   Constitutional: She appears well-developed and well-nourished.   HENT:   Head: Normocephalic and atraumatic.   Eyes: Conjunctivae and EOM are normal.   Neck: Neck supple. No thyromegaly present.   Cardiovascular: Normal rate and regular rhythm.   Pulmonary/Chest: Effort normal. No respiratory distress.   Abdominal: Soft. She exhibits no distension.   Musculoskeletal:   R  groin wound superficial aspect granulating and healing well  Inferior aspect with erythema and induration, small amount of purulent drainage       Significant Labs:  CBC:   Recent Labs   Lab 05/02/19  1239 05/02/19  1253   WBC 7.59  --    RBC 4.52  --    HGB 11.0*  --    HCT 35.9* 32*   *  --    MCV 79*  --    MCH 24.3*  --    MCHC 30.6*  --      CMP: No results for input(s): GLU, CALCIUM, ALBUMIN, PROT, NA, K, CO2, CL, BUN, CREATININE, ALKPHOS, ALT, AST, BILITOT in the last 48 hours.    Significant Diagnostics:  CTA pending

## 2019-05-02 NOTE — CONSULTS
[facility]  Vascular Surgery  Consult Note    Inpatient consult to Vascular Surgery  Consult performed by: Jayde Rodriguez MD  Consult ordered by: Kevin Razo DO        Subjective:     Chief Complaint/Reason for Admission: R groin abscess    History of Present Illness: 78 y F with PAD, HTN, DM, CHF, prior fem-fem 20 yrs ago now occluded, R iliofemoral bypass 3/20/19 complicated by wound dehiscence s/p washout.    Procedures:  3/20/19: s/p R iliofemoral bypass 3/20/19  4/12/19: R groin washout    In clinic several days ago it was noted that the debrided area was clean and healing well. CTA showed the fluid collection has decreased in size. She reports she completed her PO abx, and denies RLE pain. However, on exam today there appeared to be an abscess at the inferior aspect of the wound with a small amount of purulent drainage.        (Not in a hospital admission)    Review of patient's allergies indicates:   Allergen Reactions    Motrin [ibuprofen] Itching       Past Medical History:   Diagnosis Date    CHF (congestive heart failure)     Diabetes mellitus     DVT (deep venous thrombosis)     Hypertension     Miscarriage      Past Surgical History:   Procedure Laterality Date     femoral vascular surgery       CREATION, BYPASS, ARTERIAL, ILIAC TO FEMORAL, RIGHT LOWER EXTREMITY, RIGHT PROFUNDAPLASTY Right 3/20/2019    [facility] OR    HYSTERECTOMY      ?unsure cervix present    Incision and Drainage - R groin washout, possible muscle flap Right 2019    Performed by Danny Dunbar MD at [facility] OR [address] [place] [address]     Family History     Problem Relation (Age of Onset)    Diabetes Father    Hypertension Father        Tobacco Use    Smoking status: Former Smoker     Last attempt to quit: 1980     Years since quittin.3    Smokeless tobacco: Never Used   Substance and Sexual Activity    Alcohol use: No    Drug use: No    Sexual activity: Never      Review of Systems   All other systems reviewed and are negative.    Objective:     Vital Signs (Most Recent):  Temp: 98 °F (36.7 °C) (05/02/19 1104)  Pulse: 80 (05/02/19 1104)  Resp: 16 (05/02/19 1104)  BP: (!) 170/72 (05/02/19 1104)  SpO2: 98 % (05/02/19 1104) Vital Signs (24h Range):  Temp:  [98 °F (36.7 °C)] 98 °F (36.7 °C)  Pulse:  [80] 80  Resp:  [16] 16  SpO2:  [98 %] 98 %  BP: (132-170)/(52-72) 170/72     Weight: 73.9 kg (163 lb)  Body mass index is 31.83 kg/m².    Physical Exam   Constitutional: She appears well-developed and well-nourished.   HENT:   Head: Normocephalic and atraumatic.   Eyes: Conjunctivae and EOM are normal.   Neck: Neck supple. No thyromegaly present.   Cardiovascular: Normal rate and regular rhythm.   Pulmonary/Chest: Effort normal. No respiratory distress.   Abdominal: Soft. She exhibits no distension.   Musculoskeletal:   R groin wound superficial aspect granulating and healing well  Inferior aspect with erythema and induration, small amount of purulent drainage       Significant Labs:  CBC:   Recent Labs   Lab 05/02/19  1239 05/02/19  1253   WBC 7.59  --    RBC 4.52  --    HGB 11.0*  --    HCT 35.9* 32*   *  --    MCV 79*  --    MCH 24.3*  --    MCHC 30.6*  --      CMP: No results for input(s): GLU, CALCIUM, ALBUMIN, PROT, NA, K, CO2, CL, BUN, CREATININE, ALKPHOS, ALT, AST, BILITOT in the last 48 hours.    Significant Diagnostics:  CTA pending    Assessment/Plan:     Fluid collection at surgical site  78 y F with PAD, HTN, DM, CHF, prior fem-fem 20 yrs ago now occluded, R iliofemoral bypass 3/20/19 complicated by wound dehiscence s/p washout. Now with abscess at inferior aspect with small amount of purulent drainage  - admit  - blood cultures, then IV abx  - ID consult as she completed course of PO abx but now with new abscess at inferior aspect of wound  - f/u CTA  - NPO p MN  - plan for I&D in OR tomorrow        Thank you for your consult. I will follow-up with patient.  Please contact us if you have any additional questions.    Jayde Rodriguez MD  Vascular Surgery  [facility]

## 2019-05-02 NOTE — ED PROVIDER NOTES
Encounter Date: 5/2/2019    SCRIBE #1 NOTE: I, Hansel Gentile, am scribing for, and in the presence of,  Dr. Razo. I have scribed the entire note.       History     Chief Complaint   Patient presents with    Recurrent Skin Infections     boil to right groin.  pt sent by MD, recent vascular bypass at [facility].     Time patient was seen by the provider: 12:35 PM        Patient is a 78-year-old female with a history of peripheral arterial disease, hypertension, diabetes, CHF and a history of prior fem-fem 20 years ago now occluded presenting with groin and wound site abscess..  She is also status post right iliofemoral bypass on March 20, 2019 complicated by wound dehiscence after a washout.  Her last groin washout on the right side was 04/12/2019.  She now presents with an area of drainage and discharge from her right groin with a CTA showing increased fluid collection which is decreased in size.  She has completed antibiotics orally and denies any right lower extremity pain. She also appears to have an abscess today in clinic at the inferior aspect of the wound with a small amount of purulent drainage. The onset has been gradual, associated with purulent discharge, aggravated by palpation and alleviated minimally by rest.  Current pain scale 3/10 worsen with direct palpation.  Review of patient's allergies indicates:   Allergen Reactions    Motrin [ibuprofen] Itching     Past Medical History:   Diagnosis Date    CHF (congestive heart failure)     Diabetes mellitus     DVT (deep venous thrombosis)     Hypertension     Miscarriage      Past Surgical History:   Procedure Laterality Date     femoral vascular surgery       CREATION, BYPASS, ARTERIAL, ILIAC TO FEMORAL, RIGHT LOWER EXTREMITY, RIGHT PROFUNDAPLASTY Right 3/20/2019    Performed by Danny [facility]bar [facility] at [facility] OR    HYSTERECTOMY      ?unsure cervix present    Incision and Drainage - R groin washout, possible muscle flap Right 4/12/2019     Performed by Danny [facility]bar MD at [facility] OR [address] [place] [address]     Family History   Problem Relation Age of Onset    Diabetes Father     Hypertension Father      Social History     Tobacco Use    Smoking status: Former Smoker     Last attempt to quit: 1980     Years since quittin.3    Smokeless tobacco: Never Used   Substance Use Topics    Alcohol use: No    Drug use: No     Review of Systems   Constitutional: Negative for chills and fever.   HENT: Negative for ear discharge and sore throat.    Eyes: Negative for redness and visual disturbance.   Respiratory: Negative for choking and shortness of breath.    Cardiovascular: Negative for chest pain.   Gastrointestinal: Negative for nausea.   Genitourinary: Negative for dysuria and pelvic pain.   Musculoskeletal: Negative for back pain.   Skin: Negative for rash.        Boil on healing wound.    Neurological: Negative for weakness.   All other systems reviewed and are negative.      Physical Exam     Initial Vitals [19 1104]   BP Pulse Resp Temp SpO2   (!) 170/72 80 16 98 °F (36.7 °C) 98 %      MAP       --         Physical Exam  Gen/Constitutional: Interactive. No acute distress  Head: Normocephalic, Atraumatic  Neck: supple, no masses or LAD  Eyes: PERRLA, conjunctiva clear  Ears, Nose and Throat: No rhinorrhea or stridor.  Cardiac: Reg Rhythm, No murmur  Pulmonary: CTA Bilat, no wheezes, rhonchi, rales.  GI: Abdomen soft, non-tender, non-distended; no rebound or guarding  : No CVA tenderness.  Musculoskeletal: Extremities warm, well perfused, no erythema, no edema  Right groin wound with the superficial aspect with granulation tissue any healing however the inferior aspect shows erythema, edema along with induration small amount of purulent drainage worse with expression.  Skin: No rashes  Neuro: Alert and Oriented x 3; No focal motor or sensory deficits.    Psych: Normal affect    ED Course   Procedures  Labs Reviewed   CBC W/ AUTO DIFFERENTIAL -  Abnormal; Notable for the following components:       Result Value    Hemoglobin 11.0 (*)     Hematocrit 35.9 (*)     Mean Corpuscular Volume 79 (*)     Mean Corpuscular Hemoglobin 24.3 (*)     Mean Corpuscular Hemoglobin Conc 30.6 (*)     RDW 14.9 (*)     Platelets 354 (*)     All other components within normal limits   URINALYSIS, REFLEX TO URINE CULTURE - Abnormal; Notable for the following components:    Specific Gravity, UA >=1.030 (*)     All other components within normal limits    Narrative:     Preferred Collection Type->Urine, Clean Catch  yellow and grey   COMPREHENSIVE METABOLIC PANEL - Abnormal; Notable for the following components:    Glucose 121 (*)     BUN, Bld 24 (*)     Total Protein 8.5 (*)     Albumin 3.4 (*)     eGFR if  45.4 (*)     eGFR if non  39.4 (*)     All other components within normal limits   POCT GLUCOSE - Abnormal; Notable for the following components:    POC Glucose 184 (*)     POC Hematocrit 32 (*)     All other components within normal limits   LACTIC ACID, PLASMA   PROTIME-INR   TYPE & SCREEN   ISTAT CHEM8   POCT GLUCOSE MONITORING CONTINUOUS          Imaging Results          CTA Runoff ABD PEL Bilat Lower Ext (Final result)  Result time 05/02/19 16:15:42    Final result by Rigo Swain MD (05/02/19 16:15:42)                 Impression:      Status post right iliac-femoral artery bypass with continued focal narrowing at its origin and persistent soft tissues stranding along the course of the iliac graft similar to prior exams dating back to CT 03/24/2019.  Stable fusiform dilatation or pseudoaneurysm in the right groin near the femoral artery anastomosis with soft tissue thickening and small fluid collection in the right groin which may represent a sterile or infected collection.    Chronic femoral-femoral bypass graft occlusion.    Atheromatous disease throughout both superficial femoral arteries with multiple areas of high-grade stenosis  bilaterally notable for chronic short segment occlusion at the distal right SFA with distal reconstitution via collaterals and bilateral three-vessel runoff.    Persistent mild right pelvicaliectasis.    Probable small airways disease.    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    Additional stable findings as detailed above.    Electronically signed by resident: Ck Doan  Date:    05/02/2019  Time:    15:00    Electronically signed by: Rigo Swain MD  Date:    05/02/2019  Time:    16:15             Narrative:    EXAMINATION:  CTA RUNOFF ABD PEL BILAT LOWER EXT    CLINICAL HISTORY:  groin swelling;    TECHNIQUE:  CTA of abdomen, pelvis, and bilateral lower extremities performed with 125 mL Omnipaque 350 intravenous contrast.  Chest was included due to technical factors.    COMPARISON:  CT 04/24/2019    FINDINGS:  Aorta: Normal caliber.  No dissection, penetrating ulcer, or intramural hematoma.  Celiac trunk, SMA, and JIMENA are patent noting there is moderate calcific plaque at their origins.    Right common iliac artery is patent.  There is occlusion of the right external iliac and common femoral artery status post right iliac-femoral artery bypass which is patent.  Femoral-femoral bypass graft is occluded.  Right internal iliac artery demonstrates a linear low-attenuation focus which may represent a dissection flap similar to prior..  There is increased soft tissue density around the right iliac bypass graft and there is fusiform dilatation or pseudoaneurysm in the right groin near the femoral artery insertion which appears similar size compared to prior exam.  There is soft tissue thickening in the right groin with a central low attenuation area suggestive of an organized fluid collection which appears mildly decreased compared to prior.    The superficial and deep right femoral artery are patent with scattered atheromatous disease and there are a few areas of high-grade stenosis and  possible short segment occlusion involving the SFA.  Popliteal artery is patent with satisfactory 3 vessel runoff to the level of the ankle with scattered atheromatous plaque.    Left common iliac and external iliac are patent with moderate calcific plaque.  There is scattered atheromatous plaque throughout the superficial femoral artery which demonstrates some areas of high-grade stenosis and possible short segment occlusion.  There is scattered calcific plaque within the popliteal artery.  Anterior tibial artery, posterior tibial artery and peroneal artery are patent to the level of the ankle.    Structures at the base of the neck are unremarkable.  Mediastinum and bilateral hilum demonstrate no pathologic lymphadenopathy or other abnormality.  Heart is normal size.  No pericardial effusion.  Trachea and proximal bronchial airways are patent.  Pulmonary arteries distribute normally.  There are 4 pulmonary veins.  Esophagus is unremarkable.  There is dense coronary artery atherosclerotic calcification.  Lungs demonstrate bilateral bronchiectasis with lower lobe predominance and diffuse bilateral heterogenous parenchymal attenuation with multifocal bilateral patchy ground-glass airspace with basilar predominance.  No pulmonary mass, pleural effusion, significant pleural thickening or pneumothorax.  The liver, pancreas, and spleen are unremarkable.  There is cholelithiasis.  No biliary ductal dilatation.  Adrenal glands are unremarkable.  Kidneys are normal size.  Stable left renal cortical hypodensity which is most likely a cyst.  There is persistent mild right pelvicaliectasis.  The mid/distal right ureters not well visualized due to the soft tissue stranding along the right iliac bypass graft.  No left hydronephrosis..  GI tract is demonstrates no evidence for obstruction or inflammation.  Reproductive organs are unremarkable.  No ascites.  No retroperitoneal lymphadenopathy.    Regional osseous structures  demonstrate advanced degenerative changes at the right hip with findings highly suggestive of avascular necrosis.                                 Medical Decision Making:   History:   Old Medical Records: I decided to obtain old medical records.  Old Records Summarized: records from clinic visits.  Initial Assessment:   Patient is a 78-year-old female with a history of peripheral arterial disease, hypertension, diabetes, CHF and a history of prior fem-fem 20 years ago now occluded presenting with groin and wound site abscess.  Differential Diagnosis:   Differential diagnosis includes but is not limited to:  Surgical site infection, abscess, cellulitis, graft failure, bacteremia, sepsis  Independently Interpreted Test(s):   I have ordered and independently interpreted X-rays - see prior notes.  Clinical Tests:   Lab Tests: Ordered and Reviewed  Radiological Study: Ordered and Reviewed    Emergent evaluation of a patient presenting with groin abscess after iliofemoral bypass.  She is afebrile and VS are stable. Physical exam findings remarkable for surgical site groin abscess on the inferior aspect with purulent discharge. Will obtain labs, CTA abdomen pelvis with runoff.  Discussed case with vascular surgery, will obtain blood cultures, IV antibiotics.  Consult ID on admission.  NPO after midnight per vascular surgery, plan is for I and D in the operating room tomorrow.  Patient agreeable with plan and admission.     Complexity:  Moderate high            Scribe Attestation:   Scribe #1: I performed the above scribed service and the documentation accurately describes the services I performed. I attest to the accuracy of the note.    I, Dr. Kevin Razo, personally performed the services described in this documentation. All medical record entries made by the scribe were at my direction and in my presence.  I have reviewed the chart and agree that the record reflects my personal performance and is accurate and  complete.              Clinical Impression:       ICD-10-CM ICD-9-CM   1. PVD (peripheral vascular disease) I73.9 443.9   2. Abscess of right groin L02.214 682.2   3. Fluid collection at surgical site, subsequent encounter T88.8XXD V58.89     998.13         Disposition:   Disposition: Admitted  Condition: Fair       Kevin Razo DO  Dept of Emergency Medicine   [facility]  Spectralink: [id number]                   Kevin Razo DO  05/03/19 [id number]

## 2019-05-02 NOTE — ASSESSMENT & PLAN NOTE
78 y F with PAD, HTN, DM, CHF, prior fem-fem 20 yrs ago now occluded, R iliofemoral bypass 3/20/19 complicated by wound dehiscence s/p washout. Now with abscess at inferior aspect with small amount of purulent drainage  - admit  - blood cultures, then IV abx  - ID consult as she completed course of PO abx but now with new abscess at inferior aspect of wound  - f/u CTA  - NPO p MN  - plan for I&D in OR tomorrow

## 2019-05-02 NOTE — ED NOTES
Patient identifiers for Nelsy Marino 78 y.o. female checked and correct.  Chief Complaint   Patient presents with    Recurrent Skin Infections     boil to right groin.  pt sent by MD, recent vascular bypass at boil site.     Past Medical History:   Diagnosis Date    CHF (congestive heart failure)     Diabetes mellitus     DVT (deep venous thrombosis)     Hypertension     Miscarriage      Allergies reported:   Review of patient's allergies indicates:   Allergen Reactions    Motrin [ibuprofen] Itching         LOC: Patient is awake, alert, and aware of environment with an appropriate affect. Patient is oriented x 3 and speaking appropriately.  APPEARANCE: Patient resting comfortably and in no acute distress. Patient is clean and well groomed, patient's clothing is properly fastened.  SKIN: The skin is warm and dry. Patient has normal skin turgor and moist mucus membranes. Patient reports vascular bypass operation localized to the right femoral. Boil at site. Patient has horizontal scar on her right abdomen.  MUSKULOSKELETAL: Patient is moving all extremities well, no obvious deformities noted. Pulses intact. Patient ambulates with a walker.  RESPIRATORY: Airway is open and patent. Respirations are spontaneous and non-labored with normal effort and rate.   CARDIAC: Patient has a normal rate and rhythm. No peripheral edema noted.   ABDOMEN: No distention noted. Bowel sounds active in all 4 quadrants. Soft and tender upon palpation: right side. Reports normal appetite. Patient reports pain to right femoral; 4/10.  NEUROLOGICAL: PERRL. Facial expression is symmetrical. Hand grasps are equal bilaterally. Normal sensation in all extremities when touched with finger.

## 2019-05-02 NOTE — LETTER
May 5, 2019        [place] [address] [facility] [address]             [facility] - Vascular Surgery  [address]  [place] [address]  Phone: [phone]  Fax: [phone]   Patient: Nelsy Marino   MR Number: [id number]   YOB: 1940   Date of Visit: 5/2/2019       Dear Dr. Garay:    Thank you for referring Nelsy Marino to me for evaluation. Below are the relevant portions of my assessment and plan of care.            If you have questions, please do not hesitate to call me. I look forward to following Nelsy along with you.    Sincerely,      Danny Dunbar MD           CC  No Recipients

## 2019-05-02 NOTE — HPI
78 y F with PAD, HTN, DM, CHF, prior fem-fem 20 yrs ago now occluded, R iliofemoral bypass 3/20/19 complicated by wound dehiscence s/p washout.    Procedures:  3/20/19: s/p R iliofemoral bypass 3/20/19  4/12/19: R groin washout    In clinic several days ago it was noted that the debrided area was clean and healing well. CTA showed the fluid collection has decreased in size. She reports she completed her PO abx, and denies RLE pain. However, there appeared to be an abscess at the inferior aspect of the wound with a small amount of purulent drainage.

## 2019-05-02 NOTE — ED TRIAGE NOTES
Patient sent by MD to have boil near groin checked out. Patient reports that MD stated it needs to be cleaned out. Recent vascular bypass at site on March 20th, 2019.

## 2019-05-03 ENCOUNTER — ANESTHESIA (OUTPATIENT)
Dept: SURGERY | Facility: HOSPITAL | Age: 79
DRG: 580 | End: 2019-05-03
Payer: MEDICARE

## 2019-05-03 LAB
ANION GAP SERPL CALC-SCNC: 9 MMOL/L (ref 8–16)
BASOPHILS # BLD AUTO: 0.01 K/UL (ref 0–0.2)
BASOPHILS NFR BLD: 0.1 % (ref 0–1.9)
BUN SERPL-MCNC: 19 MG/DL (ref 8–23)
CALCIUM SERPL-MCNC: 9.3 MG/DL (ref 8.7–10.5)
CHLORIDE SERPL-SCNC: 105 MMOL/L (ref 95–110)
CO2 SERPL-SCNC: 25 MMOL/L (ref 23–29)
CREAT SERPL-MCNC: 1.1 MG/DL (ref 0.5–1.4)
DIFFERENTIAL METHOD: ABNORMAL
EOSINOPHIL # BLD AUTO: 0.2 K/UL (ref 0–0.5)
EOSINOPHIL NFR BLD: 3 % (ref 0–8)
ERYTHROCYTE [DISTWIDTH] IN BLOOD BY AUTOMATED COUNT: 14.8 % (ref 11.5–14.5)
EST. GFR  (AFRICAN AMERICAN): 55.6 ML/MIN/1.73 M^2
EST. GFR  (NON AFRICAN AMERICAN): 48.2 ML/MIN/1.73 M^2
GLUCOSE SERPL-MCNC: 142 MG/DL (ref 70–110)
GRAM STN SPEC: NORMAL
HCT VFR BLD AUTO: 31.6 % (ref 37–48.5)
HGB BLD-MCNC: 9.6 G/DL (ref 12–16)
IMM GRANULOCYTES # BLD AUTO: 0.02 K/UL (ref 0–0.04)
IMM GRANULOCYTES NFR BLD AUTO: 0.3 % (ref 0–0.5)
LYMPHOCYTES # BLD AUTO: 1.2 K/UL (ref 1–4.8)
LYMPHOCYTES NFR BLD: 17.2 % (ref 18–48)
MCH RBC QN AUTO: 23.9 PG (ref 27–31)
MCHC RBC AUTO-ENTMCNC: 30.4 G/DL (ref 32–36)
MCV RBC AUTO: 79 FL (ref 82–98)
MONOCYTES # BLD AUTO: 0.4 K/UL (ref 0.3–1)
MONOCYTES NFR BLD: 5.3 % (ref 4–15)
NEUTROPHILS # BLD AUTO: 5.4 K/UL (ref 1.8–7.7)
NEUTROPHILS NFR BLD: 74.1 % (ref 38–73)
NRBC BLD-RTO: 0 /100 WBC
PLATELET # BLD AUTO: 311 K/UL (ref 150–350)
PMV BLD AUTO: 10.8 FL (ref 9.2–12.9)
POCT GLUCOSE: 106 MG/DL (ref 70–110)
POCT GLUCOSE: 124 MG/DL (ref 70–110)
POCT GLUCOSE: 147 MG/DL (ref 70–110)
POCT GLUCOSE: 96 MG/DL (ref 70–110)
POTASSIUM SERPL-SCNC: 3.8 MMOL/L (ref 3.5–5.1)
RBC # BLD AUTO: 4.01 M/UL (ref 4–5.4)
SODIUM SERPL-SCNC: 139 MMOL/L (ref 136–145)
WBC # BLD AUTO: 7.23 K/UL (ref 3.9–12.7)

## 2019-05-03 PROCEDURE — 97598 DBRDMT OPN WND ADDL 20CM/<: CPT | Mod: ,,, | Performed by: SURGERY

## 2019-05-03 PROCEDURE — 20600001 HC STEP DOWN PRIVATE ROOM

## 2019-05-03 PROCEDURE — 36000704 HC OR TIME LEV I 1ST 15 MIN: Performed by: SURGERY

## 2019-05-03 PROCEDURE — D9220A PRA ANESTHESIA: Mod: CRNA,,, | Performed by: NURSE ANESTHETIST, CERTIFIED REGISTERED

## 2019-05-03 PROCEDURE — 71000033 HC RECOVERY, INTIAL HOUR: Performed by: SURGERY

## 2019-05-03 PROCEDURE — 25000003 PHARM REV CODE 250: Performed by: STUDENT IN AN ORGANIZED HEALTH CARE EDUCATION/TRAINING PROGRAM

## 2019-05-03 PROCEDURE — 87102 FUNGUS ISOLATION CULTURE: CPT

## 2019-05-03 PROCEDURE — 25000003 PHARM REV CODE 250: Performed by: ANESTHESIOLOGY

## 2019-05-03 PROCEDURE — 63600175 PHARM REV CODE 636 W HCPCS: Performed by: NURSE ANESTHETIST, CERTIFIED REGISTERED

## 2019-05-03 PROCEDURE — 27201423 OPTIME MED/SURG SUP & DEVICES STERILE SUPPLY: Performed by: SURGERY

## 2019-05-03 PROCEDURE — 82962 GLUCOSE BLOOD TEST: CPT | Performed by: SURGERY

## 2019-05-03 PROCEDURE — 87206 SMEAR FLUORESCENT/ACID STAI: CPT | Mod: 91

## 2019-05-03 PROCEDURE — 27000221 HC OXYGEN, UP TO 24 HOURS

## 2019-05-03 PROCEDURE — 36000705 HC OR TIME LEV I EA ADD 15 MIN: Performed by: SURGERY

## 2019-05-03 PROCEDURE — 63600175 PHARM REV CODE 636 W HCPCS: Performed by: STUDENT IN AN ORGANIZED HEALTH CARE EDUCATION/TRAINING PROGRAM

## 2019-05-03 PROCEDURE — 87205 SMEAR GRAM STAIN: CPT

## 2019-05-03 PROCEDURE — 80048 BASIC METABOLIC PNL TOTAL CA: CPT

## 2019-05-03 PROCEDURE — 94761 N-INVAS EAR/PLS OXIMETRY MLT: CPT

## 2019-05-03 PROCEDURE — D9220A PRA ANESTHESIA: Mod: ANES,,, | Performed by: ANESTHESIOLOGY

## 2019-05-03 PROCEDURE — 97597 PR DEBRIDEMENT OPEN WOUND 20 SQ CM<: ICD-10-PCS | Mod: 78,,, | Performed by: SURGERY

## 2019-05-03 PROCEDURE — 36415 COLL VENOUS BLD VENIPUNCTURE: CPT

## 2019-05-03 PROCEDURE — 25000003 PHARM REV CODE 250: Performed by: NURSE ANESTHETIST, CERTIFIED REGISTERED

## 2019-05-03 PROCEDURE — D9220A PRA ANESTHESIA: ICD-10-PCS | Mod: CRNA,,, | Performed by: NURSE ANESTHETIST, CERTIFIED REGISTERED

## 2019-05-03 PROCEDURE — 85025 COMPLETE CBC W/AUTO DIFF WBC: CPT

## 2019-05-03 PROCEDURE — 37000009 HC ANESTHESIA EA ADD 15 MINS: Performed by: SURGERY

## 2019-05-03 PROCEDURE — 87116 MYCOBACTERIA CULTURE: CPT

## 2019-05-03 PROCEDURE — 87070 CULTURE OTHR SPECIMN AEROBIC: CPT

## 2019-05-03 PROCEDURE — 87075 CULTR BACTERIA EXCEPT BLOOD: CPT | Mod: 59

## 2019-05-03 PROCEDURE — 37000008 HC ANESTHESIA 1ST 15 MINUTES: Performed by: SURGERY

## 2019-05-03 PROCEDURE — 97598 PR DEBRIDEMENT OPEN WOUND EA ADDL 20 SQ CM: ICD-10-PCS | Mod: ,,, | Performed by: SURGERY

## 2019-05-03 PROCEDURE — D9220A PRA ANESTHESIA: ICD-10-PCS | Mod: ANES,,, | Performed by: ANESTHESIOLOGY

## 2019-05-03 PROCEDURE — 97597 DBRDMT OPN WND 1ST 20 CM/<: CPT | Mod: 78,,, | Performed by: SURGERY

## 2019-05-03 PROCEDURE — 25000003 PHARM REV CODE 250: Performed by: SURGERY

## 2019-05-03 PROCEDURE — 71000039 HC RECOVERY, EACH ADD'L HOUR: Performed by: SURGERY

## 2019-05-03 RX ORDER — BACITRACIN 50000 [IU]/1
INJECTION, POWDER, FOR SOLUTION INTRAMUSCULAR
Status: DISCONTINUED | OUTPATIENT
Start: 2019-05-03 | End: 2019-05-03 | Stop reason: HOSPADM

## 2019-05-03 RX ORDER — FENTANYL CITRATE 50 UG/ML
25 INJECTION, SOLUTION INTRAMUSCULAR; INTRAVENOUS EVERY 5 MIN PRN
Status: DISCONTINUED | OUTPATIENT
Start: 2019-05-03 | End: 2019-05-03 | Stop reason: HOSPADM

## 2019-05-03 RX ORDER — OXYCODONE AND ACETAMINOPHEN 5; 325 MG/1; MG/1
1 TABLET ORAL EVERY 4 HOURS PRN
Status: DISCONTINUED | OUTPATIENT
Start: 2019-05-03 | End: 2019-05-04 | Stop reason: HOSPADM

## 2019-05-03 RX ORDER — ROCURONIUM BROMIDE 10 MG/ML
INJECTION, SOLUTION INTRAVENOUS
Status: DISCONTINUED | OUTPATIENT
Start: 2019-05-03 | End: 2019-05-03

## 2019-05-03 RX ORDER — OXYCODONE HYDROCHLORIDE 5 MG/1
5 TABLET ORAL
Status: DISCONTINUED | OUTPATIENT
Start: 2019-05-03 | End: 2019-05-03

## 2019-05-03 RX ORDER — AMLODIPINE BESYLATE 5 MG/1
5 TABLET ORAL DAILY
Status: DISCONTINUED | OUTPATIENT
Start: 2019-05-03 | End: 2019-05-03

## 2019-05-03 RX ORDER — ACETAMINOPHEN 10 MG/ML
INJECTION, SOLUTION INTRAVENOUS
Status: DISCONTINUED | OUTPATIENT
Start: 2019-05-03 | End: 2019-05-03

## 2019-05-03 RX ORDER — ONDANSETRON 2 MG/ML
INJECTION INTRAMUSCULAR; INTRAVENOUS
Status: DISCONTINUED | OUTPATIENT
Start: 2019-05-03 | End: 2019-05-03

## 2019-05-03 RX ORDER — MIDAZOLAM HYDROCHLORIDE 1 MG/ML
INJECTION INTRAMUSCULAR; INTRAVENOUS
Status: DISCONTINUED | OUTPATIENT
Start: 2019-05-03 | End: 2019-05-03

## 2019-05-03 RX ORDER — GLYCOPYRROLATE 0.2 MG/ML
INJECTION INTRAMUSCULAR; INTRAVENOUS
Status: DISCONTINUED | OUTPATIENT
Start: 2019-05-03 | End: 2019-05-03

## 2019-05-03 RX ORDER — NEOSTIGMINE METHYLSULFATE 1 MG/ML
INJECTION, SOLUTION INTRAVENOUS
Status: DISCONTINUED | OUTPATIENT
Start: 2019-05-03 | End: 2019-05-03

## 2019-05-03 RX ORDER — INSULIN ASPART 100 [IU]/ML
0-5 INJECTION, SOLUTION INTRAVENOUS; SUBCUTANEOUS EVERY 6 HOURS PRN
Status: DISCONTINUED | OUTPATIENT
Start: 2019-05-03 | End: 2019-05-04 | Stop reason: HOSPADM

## 2019-05-03 RX ORDER — FENTANYL CITRATE 50 UG/ML
INJECTION, SOLUTION INTRAMUSCULAR; INTRAVENOUS
Status: DISCONTINUED | OUTPATIENT
Start: 2019-05-03 | End: 2019-05-03

## 2019-05-03 RX ORDER — PROPOFOL 10 MG/ML
VIAL (ML) INTRAVENOUS
Status: DISCONTINUED | OUTPATIENT
Start: 2019-05-03 | End: 2019-05-03

## 2019-05-03 RX ORDER — GLUCAGON 1 MG
1 KIT INJECTION
Status: DISCONTINUED | OUTPATIENT
Start: 2019-05-03 | End: 2019-05-04 | Stop reason: HOSPADM

## 2019-05-03 RX ORDER — LIDOCAINE HCL/PF 100 MG/5ML
SYRINGE (ML) INTRAVENOUS
Status: DISCONTINUED | OUTPATIENT
Start: 2019-05-03 | End: 2019-05-03

## 2019-05-03 RX ORDER — AMLODIPINE BESYLATE 10 MG/1
10 TABLET ORAL DAILY
Status: DISCONTINUED | OUTPATIENT
Start: 2019-05-03 | End: 2019-05-03 | Stop reason: HOSPADM

## 2019-05-03 RX ADMIN — SODIUM CHLORIDE: 0.9 INJECTION, SOLUTION INTRAVENOUS at 02:05

## 2019-05-03 RX ADMIN — FENTANYL CITRATE 75 MCG: 50 INJECTION, SOLUTION INTRAMUSCULAR; INTRAVENOUS at 12:05

## 2019-05-03 RX ADMIN — ACETAMINOPHEN 1000 MG: 10 INJECTION, SOLUTION INTRAVENOUS at 01:05

## 2019-05-03 RX ADMIN — ONDANSETRON 4 MG: 2 INJECTION INTRAMUSCULAR; INTRAVENOUS at 01:05

## 2019-05-03 RX ADMIN — MIDAZOLAM HYDROCHLORIDE 1 MG: 1 INJECTION, SOLUTION INTRAMUSCULAR; INTRAVENOUS at 12:05

## 2019-05-03 RX ADMIN — GLYCOPYRROLATE 0.6 MG: 0.2 INJECTION, SOLUTION INTRAMUSCULAR; INTRAVENOUS at 01:05

## 2019-05-03 RX ADMIN — PIPERACILLIN AND TAZOBACTAM 4.5 G: 4; .5 INJECTION, POWDER, LYOPHILIZED, FOR SOLUTION INTRAVENOUS; PARENTERAL at 03:05

## 2019-05-03 RX ADMIN — NEOSTIGMINE METHYLSULFATE 5 MG: 1 INJECTION INTRAVENOUS at 01:05

## 2019-05-03 RX ADMIN — OXYCODONE HYDROCHLORIDE AND ACETAMINOPHEN 1 TABLET: 5; 325 TABLET ORAL at 04:05

## 2019-05-03 RX ADMIN — PIPERACILLIN AND TAZOBACTAM 4.5 G: 4; .5 INJECTION, POWDER, LYOPHILIZED, FOR SOLUTION INTRAVENOUS; PARENTERAL at 11:05

## 2019-05-03 RX ADMIN — ROCURONIUM BROMIDE 30 MG: 10 INJECTION, SOLUTION INTRAVENOUS at 12:05

## 2019-05-03 RX ADMIN — PROPOFOL 30 MG: 10 INJECTION, EMULSION INTRAVENOUS at 12:05

## 2019-05-03 RX ADMIN — PROPOFOL 30 MG: 10 INJECTION, EMULSION INTRAVENOUS at 01:05

## 2019-05-03 RX ADMIN — OXYCODONE HYDROCHLORIDE AND ACETAMINOPHEN 1 TABLET: 5; 325 TABLET ORAL at 11:05

## 2019-05-03 RX ADMIN — LIDOCAINE HYDROCHLORIDE 75 MG: 20 INJECTION, SOLUTION INTRAVENOUS at 12:05

## 2019-05-03 RX ADMIN — AMLODIPINE BESYLATE 10 MG: 5 TABLET ORAL at 10:05

## 2019-05-03 RX ADMIN — PIPERACILLIN AND TAZOBACTAM 4.5 G: 4; .5 INJECTION, POWDER, LYOPHILIZED, FOR SOLUTION INTRAVENOUS; PARENTERAL at 06:05

## 2019-05-03 RX ADMIN — FENTANYL CITRATE 25 MCG: 50 INJECTION, SOLUTION INTRAMUSCULAR; INTRAVENOUS at 01:05

## 2019-05-03 RX ADMIN — PROPOFOL 80 MG: 10 INJECTION, EMULSION INTRAVENOUS at 12:05

## 2019-05-03 NOTE — HPI
79 y/o F w/ HTN, DMII, hx of DVT, CHF, PAD present w/ concerns of small amount of purulent drainage from groin wound.  Pt  w/ prior fem-fem 20 years prior that was occluded and underwent R iliofemoral bypass 3/20/19 c/b wound dehiscence and she underwent washout on 4/12. Wound cultures from swab prior to sx +Proteus. Surgical cultures +genital damir.  Pt discharged on levofloxacin 500mg po q 48hr for skin/soft tissue infection which she reports compliance to.    CT:Status post right iliac-femoral artery bypass with continued focal narrowing at its origin and persistent soft tissues stranding along the course of the iliac graft similar to prior exams dating back to CT 03/24/2019.  Stable fusiform dilatation or pseudoaneurysm in the right groin near the femoral artery anastomosis with soft tissue thickening and small fluid collection in the right groin which may represent a sterile or infected collection.      Pt started on zosyn. She is afebrile, stable, without a leukocytosis.  Now s/p I and D on 5/3 per brief op note-no purulence noted.  Cxs taken. Pt denies pain. No issues.

## 2019-05-03 NOTE — HOSPITAL COURSE
5/3: to OR today for washout of R groin incision without purulent drainage. Has been Afebrile without leukocytosis. BCx remain negative.   5/4: Doing well. Gram stain did now show organisms. Ready for discharge with abx. Will change dressing daily.

## 2019-05-03 NOTE — ANESTHESIA POSTPROCEDURE EVALUATION
Anesthesia Post Evaluation    Patient: Nelsy Marino    Procedure(s) Performed: Procedure(s) (LRB):  Incision and Drainage R groin (Right)    Final Anesthesia Type: general  Patient location during evaluation: PACU  Patient participation: Yes- Able to Participate  Level of consciousness: awake and alert  Post-procedure vital signs: reviewed and stable  Pain management: adequate  Airway patency: patent  PONV status at discharge: No PONV  Anesthetic complications: no      Cardiovascular status: blood pressure returned to baseline  Respiratory status: unassisted  Hydration status: euvolemic  Follow-up not needed.          Vitals Value Taken Time   /59 5/3/2019  3:01 PM   Temp 36.6 °C (97.8 °F) 5/3/2019  3:00 PM   Pulse 67 5/3/2019  3:07 PM   Resp 23 5/3/2019  3:07 PM   SpO2 96 % 5/3/2019  3:07 PM   Vitals shown include unvalidated device data.      Event Time     Out of Recovery 05/03/2019 15:27:14          Pain/Ana Maria Score: Pain Rating Prior to Med Admin: 0 (5/3/2019  2:45 PM)  Ana Maria Score: 10 (5/3/2019  2:45 PM)

## 2019-05-03 NOTE — PLAN OF CARE
Pt not in room as was in sx.    77 y/o F w/ HTN, DMII, hx of DVT, CHF, PAD present w/ concerns of small amount of purulent drainage from groin wound.  Pt  w/ prior fem-fem 20 years prior that was occluded and underwent R iliofemoral bypass 3/20/19 c/b wound dehiscence and she underwent washout on 4/12. Wound cultures from swab prior to sx +Proteus. Surgical cultures +genital damir.  Pt discharged on levofloxacin 500mg po q 48hr for skin/soft tissue infection which she reports compliance to.    Pt started on zosyn. She is afebrile, stable, without a leukocytosis.  Now s/p I and D on 5/3 per brief op note-no purulence noted.  Cxs taken.    -Continue Zosyn  -Will follow cxs and tailor abx accordingly.  -Vascular sx does not believe collection infectious in nature but would like tx for 2-4 wks PO abx  -ID will follow up w/ pt.

## 2019-05-03 NOTE — PROGRESS NOTES
"[facility]  Vascular Surgery  Progress Note    Patient Name: Nelsy Marino  MRN: [id number]  Admission Date: 5/2/2019  Primary Care Provider: Juan F Garay    Subjective:     Interval History: NAEON.  Plan for OR today for I&D    Post-Op Info:  Procedure(s) (LRB):  Incision and Drainage R groin (Right)         Medications Prior to Admission   Medication Sig Dispense Refill Last Dose    amlodipine (NORVASC) 10 MG tablet Take 10 mg by mouth once daily.   5/2/2019    aspirin (ECOTRIN) 81 MG EC tablet Take 81 mg by mouth once daily.   Past Month    cilostazol (PLETAL) 50 MG Tab Take 1 tablet (50 mg total) by mouth 2 (two) times daily. If patient tolerate medication after 4 weeks, increase dose to 100 mg twice daily. 60 tablet 11 5/2/2019    glipiZIDE (GLUCOTROL) 10 MG tablet Take 10 mg by mouth 2 (two) times daily before meals.   5/2/2019    levoFLOXacin (LEVAQUIN) 500 MG tablet Take 1 tablet (500 mg total) by mouth every other day. for 8 days 4 tablet 3 5/2/2019    losartan-hydrochlorothiazide 100-25 mg (HYZAAR) 100-25 mg per tablet Take 1 tablet by mouth once daily.   5/2/2019    metformin (GLUCOPHAGE) 1000 MG tablet Take 1,000 mg by mouth 2 (two) times daily with meals.   5/2/2019    oxyCODONE-acetaminophen (PERCOCET) 5-325 mg per tablet Take 1 tablet by mouth every 6 (six) hours as needed for Pain. 30 tablet 0 5/2/2019    BLOOD SUGAR DIAGNOSTIC (TRUE METRIX GLUCOSE TEST STRIP MISC) by Misc.(Non-Drug; Combo Route) route.   Taking    insulin aspart protamine-insulin aspart (NOVOLOG 70/30) 100 unit/mL (70-30) InPn pen Inject 45 Units into the skin every morning.    Taking    insulin aspart protamine-insulin aspart (NOVOLOG MIX 70-30FLEXPEN U-100) 100 unit/mL (70-30) InPn pen Inject 40 Units into the skin every evening.   Taking    insulin syringe-needle,dispos. 0.3 mL 30 gauge x 5/16" Syrg by Misc.(Non-Drug; Combo Route) route.   Taking       Review of patient's allergies indicates: "   Allergen Reactions    Motrin [ibuprofen] Itching       Past Medical History:   Diagnosis Date    CHF (congestive heart failure)     Diabetes mellitus     DVT (deep venous thrombosis)     Hypertension     Miscarriage      Past Surgical History:   Procedure Laterality Date     femoral vascular surgery       CREATION, BYPASS, ARTERIAL, ILIAC TO FEMORAL, RIGHT LOWER EXTREMITY, RIGHT PROFUNDAPLASTY Right 3/20/2019    [facility]    HYSTERECTOMY      ?unsure cervix present    Incision and Drainage - R groin washout, possible muscle flap Right 2019    Performed by [facility] at [facility] OR [address] [place] [address]     Family History     Problem Relation (Age of Onset)    Diabetes Father    Hypertension Father        Tobacco Use    Smoking status: Former Smoker     Last attempt to quit: 1980     Years since quittin.3    Smokeless tobacco: Never Used   Substance and Sexual Activity    Alcohol use: No    Drug use: No    Sexual activity: Never     Review of Systems   All other systems reviewed and are negative.    Objective:     Vital Signs (Most Recent):  Temp: 97.9 °F (36.6 °C) (19 05)  Pulse: 97 (19 05)  Resp: 20 (19 05)  BP: 133/64 (19 05)  SpO2: 99 % (19 05) Vital Signs (24h Range):  Temp:  [97.7 °F (36.5 °C)-98.3 °F (36.8 °C)] 97.9 °F (36.6 °C)  Pulse:  [78-97] 97  Resp:  [16-20] 20  SpO2:  [95 %-99 %] 99 %  BP: (132-174)/(52-72) 133/64     Weight: 74.6 kg (164 lb 7.4 oz)  Body mass index is 32.12 kg/m².    Physical Exam   Constitutional: She appears well-developed and well-nourished.   HENT:   Head: Normocephalic and atraumatic.   Eyes: Conjunctivae and EOM are normal.   Neck: Neck supple. No thyromegaly present.   Cardiovascular: Normal rate and regular rhythm.   Pulmonary/Chest: Effort normal. No respiratory distress.   Abdominal: Soft. She exhibits no distension.   Musculoskeletal:   R groin wound superficial aspect  granulating and healing well  Inferior aspect with erythema and induration, small amount of purulent drainage       Significant Labs:  CBC:   Recent Labs   Lab 05/03/19  0514   WBC 7.23   RBC 4.01   HGB 9.6*   HCT 31.6*      MCV 79*   MCH 23.9*   MCHC 30.4*     CMP:   Recent Labs   Lab 05/02/19  1549 05/03/19  0514   * 142*   CALCIUM 10.1 9.3   ALBUMIN 3.4*  --    PROT 8.5*  --     139   K 4.0 3.8   CO2 23 25    105   BUN 24* 19   CREATININE 1.3 1.1   ALKPHOS 71  --    ALT 10  --    AST 21  --    BILITOT 0.5  --        Significant Diagnostics:  CTA pending    Assessment/Plan:     Fluid collection at surgical site  78 y F with PAD, HTN, DM, CHF, prior fem-fem 20 yrs ago now occluded, R iliofemoral bypass 3/20/19 complicated by wound dehiscence s/p washout. Now with abscess at inferior aspect with small amount of purulent drainage  - NPO  - To OR for I&D today        Cuong Smiley MD  Vascular Surgery  [facility]

## 2019-05-03 NOTE — ASSESSMENT & PLAN NOTE
78 y F with PAD, HTN, DM, CHF, prior fem-fem 20 yrs ago now occluded, R iliofemoral bypass 3/20/19 complicated by wound dehiscence s/p washout. Now with abscess at inferior aspect with small amount of purulent drainage  - NPO  - To OR for I&D today

## 2019-05-03 NOTE — NURSING TRANSFER
Nursing Transfer Note      5/3/2019     Transfer to 1051 from PACU    Transfer via stretcher    Transfer with     Transported by PACU PCT    Medicines sent:     Chart send with patient: yes    Notified: spouse; Saige, 10th floor RN

## 2019-05-03 NOTE — PLAN OF CARE
Extended Emergency Contact Information  Primary Emergency Contact: Dimas Marino   [place]  Mobile Phone: [phone]  Relation: Spouse   needed? Yes  Secondary Emergency Contact: Mercedes Marino  Address: [address] [place] [address] [facility]  Home Phone: [phone]  Relation: Sister   needed? Yes    [facility]  [address] / [place] [address]    Future Appointments   Date Time Provider Department Center   5/7/2019  3:00 PM [facility] [address]     Payor: [employer] MANAGED MEDICARE / Plan: [employer] MEDICARE HMO / Product Type: Capitation /       [facility] [place] - [address]  [address]  [place] [address]  Phone: [phone] Fax: [phone]    [facility] [address] [facility]  [address] [facility] [address]  Phone: [phone] Fax: [phone]       05/03/19 1542   Discharge Assessment   Assessment Type Discharge Planning Assessment   Confirmed/corrected address and phone number on facesheet? Yes   Assessment information obtained from? Medical Record   Expected Length of Stay (days) 3   Prior to hospitilization cognitive status: Alert/Oriented;Unable to Assess   Prior to hospitalization functional status: Independent   Current cognitive status: Alert/Oriented   Current Functional Status: Needs Assistance   Lives With spouse   Able to Return to Prior Arrangements yes   Is patient able to care for self after discharge? Yes   Patient's perception of discharge disposition home health   Readmission Within the Last 30 Days previous discharge plan unsuccessful   Patient currently being followed by outpatient case management? No   Patient currently receives any other outside agency services? Yes   Name and contact number of agency or person providing outside services [facility]   Is it the patient/care giver preference to  resume care with the current outside agency? Yes   Equipment Currently Used at Home glucometer;walker, standard;walker, rolling;rollator   Do you have any problems affording any of your prescribed medications? No   Is the patient taking medications as prescribed? yes   Does the patient have transportation home? Yes   Transportation Anticipated family or friend will provide   Does the patient receive services at the [facility]? No   Discharge Plan A Home Health   Discharge Plan B Skilled Nursing Facility   DME Needed Upon Discharge  wound care supplies   Patient/Family in Agreement with Plan unable to assess

## 2019-05-03 NOTE — ASSESSMENT & PLAN NOTE
79 y/o F w/ HTN, DMII, hx of DVT, CHF, PAD present w/ concerns of small amount of purulent drainage from groin wound.  Pt  w/ prior fem-fem 20 years prior that was occluded and underwent R iliofemoral bypass 3/20/19 c/b wound dehiscence and she underwent washout on 4/12. Wound cultures from swab prior to sx +Proteus. Surgical cultures +genital damir.  Pt discharged on levofloxacin 500mg po q 48hr for skin/soft tissue infection which she reports compliance to.    CT:Status post right iliac-femoral artery bypass with continued focal narrowing at its origin and persistent soft tissues stranding along the course of the iliac graft similar to prior exams dating back to CT 03/24/2019.  Stable fusiform dilatation or pseudoaneurysm in the right groin near the femoral artery anastomosis with soft tissue thickening and small fluid collection in the right groin which may represent a sterile or infected collection.    Pt started on zosyn. She is afebrile, stable, without a leukocytosis.  Now s/p I and D on 5/3 per brief op note-no purulence noted.  Cxs taken.    -Continue Zosyn  -Will follow cxs and tailor abx accordingly.  -Vascular sx does not believe collection infectious in nature and would like tx for 2-4 wks PO abx  -ID will continue to follow.

## 2019-05-03 NOTE — SUBJECTIVE & OBJECTIVE
"Medications Prior to Admission   Medication Sig Dispense Refill Last Dose    amlodipine (NORVASC) 10 MG tablet Take 10 mg by mouth once daily.   5/2/2019    aspirin (ECOTRIN) 81 MG EC tablet Take 81 mg by mouth once daily.   Past Month    cilostazol (PLETAL) 50 MG Tab Take 1 tablet (50 mg total) by mouth 2 (two) times daily. If patient tolerate medication after 4 weeks, increase dose to 100 mg twice daily. 60 tablet 11 5/2/2019    glipiZIDE (GLUCOTROL) 10 MG tablet Take 10 mg by mouth 2 (two) times daily before meals.   5/2/2019    levoFLOXacin (LEVAQUIN) 500 MG tablet Take 1 tablet (500 mg total) by mouth every other day. for 8 days 4 tablet 3 5/2/2019    losartan-hydrochlorothiazide 100-25 mg (HYZAAR) 100-25 mg per tablet Take 1 tablet by mouth once daily.   5/2/2019    metformin (GLUCOPHAGE) 1000 MG tablet Take 1,000 mg by mouth 2 (two) times daily with meals.   5/2/2019    oxyCODONE-acetaminophen (PERCOCET) 5-325 mg per tablet Take 1 tablet by mouth every 6 (six) hours as needed for Pain. 30 tablet 0 5/2/2019    BLOOD SUGAR DIAGNOSTIC (TRUE METRIX GLUCOSE TEST STRIP MISC) by Misc.(Non-Drug; Combo Route) route.   Taking    insulin aspart protamine-insulin aspart (NOVOLOG 70/30) 100 unit/mL (70-30) InPn pen Inject 45 Units into the skin every morning.    Taking    insulin aspart protamine-insulin aspart (NOVOLOG MIX 70-30FLEXPEN U-100) 100 unit/mL (70-30) InPn pen Inject 40 Units into the skin every evening.   Taking    insulin syringe-needle,dispos. 0.3 mL 30 gauge x 5/16" Syrg by Misc.(Non-Drug; Combo Route) route.   Taking       Review of patient's allergies indicates:   Allergen Reactions    Motrin [ibuprofen] Itching       Past Medical History:   Diagnosis Date    CHF (congestive heart failure)     Diabetes mellitus     DVT (deep venous thrombosis)     Hypertension     Miscarriage      Past Surgical History:   Procedure Laterality Date     femoral vascular surgery       CREATION, BYPASS, " ARTERIAL, ILIAC TO FEMORAL, RIGHT LOWER EXTREMITY, RIGHT PROFUNDAPLASTY Right 3/20/2019    [facility]    HYSTERECTOMY      ?unsure cervix present    Incision and Drainage - R groin washout, possible muscle flap Right 2019    [facility]     Family History     Problem Relation (Age of Onset)    Diabetes Father    Hypertension Father        Tobacco Use    Smoking status: Former Smoker     Last attempt to quit: 1980     Years since quittin.3    Smokeless tobacco: Never Used   Substance and Sexual Activity    Alcohol use: No    Drug use: No    Sexual activity: Never     Review of Systems   All other systems reviewed and are negative.    Objective:     Vital Signs (Most Recent):  Temp: 97.9 °F (36.6 °C) (19)  Pulse: 97 (19)  Resp: 20 (19)  BP: 133/64 (19)  SpO2: 99 % (19) Vital Signs (24h Range):  Temp:  [97.7 °F (36.5 °C)-98.3 °F (36.8 °C)] 97.9 °F (36.6 °C)  Pulse:  [78-97] 97  Resp:  [16-20] 20  SpO2:  [95 %-99 %] 99 %  BP: (132-174)/(52-72) 133/64     Weight: 74.6 kg (164 lb 7.4 oz)  Body mass index is 32.12 kg/m².    Physical Exam   Constitutional: She appears well-developed and well-nourished.   HENT:   Head: Normocephalic and atraumatic.   Eyes: Conjunctivae and EOM are normal.   Neck: Neck supple. No thyromegaly present.   Cardiovascular: Normal rate and regular rhythm.   Pulmonary/Chest: Effort normal. No respiratory distress.   Abdominal: Soft. She exhibits no distension.   Musculoskeletal:   R groin wound superficial aspect granulating and healing well  Inferior aspect with erythema and induration, small amount of purulent drainage       Significant Labs:  CBC:   Recent Labs   Lab 19   WBC 7.23   RBC 4.01   HGB 9.6*   HCT 31.6*      MCV 79*   MCH 23.9*   MCHC 30.4*     CMP:   Recent Labs   Lab 19  1549 19  0514   * 142*    CALCIUM 10.1 9.3   ALBUMIN 3.4*  --    PROT 8.5*  --     139   K 4.0 3.8   CO2 23 25    105   BUN 24* 19   CREATININE 1.3 1.1   ALKPHOS 71  --    ALT 10  --    AST 21  --    BILITOT 0.5  --        Significant Diagnostics:  CTA pending

## 2019-05-03 NOTE — BRIEF OP NOTE
[facility]  Brief Operative Note    SUMMARY     Surgery Date: 5/3/2019     Surgeon(s) and Role:     * Danny Dunbar MD - Primary    Assisting Surgeon: None    Pre-op Diagnosis:  PVD (peripheral vascular disease) [I73.9]    Post-op Diagnosis:  Post-Op Diagnosis Codes:     * PVD (peripheral vascular disease) [I73.9]    Procedure(s) (LRB):  Incision and Drainage R groin (Right)    Anesthesia: General    Description of Procedure: incision and drainage with washout R groin, cultures obtained    Description of the findings of the procedure: no purulence noted    Estimated Blood Loss: <5cc         Specimens:   Specimen (12h ago, onward)    None

## 2019-05-03 NOTE — PLAN OF CARE
Problem: Adult Inpatient Plan of Care  Goal: Plan of Care Review  Outcome: Ongoing (interventions implemented as appropriate)  Patient is alert and oriented. Able to make needs known to staff. PRN Percocet given for pain control. No s/s of respiratory/cardiac distress noted. Right groin incision dressing is clean, dry, and intact. Left AC PIV is patent and flushes well. IV fluids run continuously at 75 ml/hr. Patient remains NPO except meds. Ambulates independently with her walker to the bathroom. Blood sugar checks continue every 6 hours and remain WNLs. VS stable. No issues or concerns at this time. Continue to monitor.

## 2019-05-03 NOTE — CONSULTS
[facility]  Infectious Disease  Consult Note    Patient Name: Nelsy Marino  MRN: [id number]  Admission Date: 5/2/2019  Hospital Length of Stay: 1 days  Attending Physician: Danny Dunbar MD  Primary Care Provider: [facility]     Isolation Status: No active isolations    Inpatient consult to Infectious Diseases  Consult performed by: Brandon Ghotra PA-C  Consult ordered by: Jayde Rodriguez MD      Consult Received.  Please see plan of care note to follow.  Pt not in room for sx today.

## 2019-05-03 NOTE — TRANSFER OF CARE
Anesthesia Transfer of Care Note    Patient: Nelsy Marino    Procedure(s) Performed: Procedure(s) (LRB):  Incision and Drainage R groin (Right)    Patient location: PACU    Anesthesia Type: general    Transport from OR: Transported from OR on 6-10 L/min O2 by face mask with adequate spontaneous ventilation    Post pain: adequate analgesia    Post assessment: tolerated procedure well and no apparent anesthetic complications    Post vital signs: stable    Level of consciousness: awake    Nausea/Vomiting: no nausea/vomiting    Complications: none    Transfer of care protocol was followed      Last vitals:   Visit Vitals  BP (P) 132/60   Pulse (P) 92   Temp 37.1 °C (98.8 °F) (Oral)   Resp (P) 20   Ht 5' (1.524 m)   Wt 74.4 kg (164 lb)   LMP  (LMP Unknown)   SpO2 (P) 99%   Breastfeeding? No   BMI 32.03 kg/m²

## 2019-05-04 VITALS
BODY MASS INDEX: 32.2 KG/M2 | WEIGHT: 164 LBS | OXYGEN SATURATION: 96 % | RESPIRATION RATE: 17 BRPM | HEART RATE: 83 BPM | DIASTOLIC BLOOD PRESSURE: 62 MMHG | HEIGHT: 60 IN | TEMPERATURE: 99 F | SYSTOLIC BLOOD PRESSURE: 150 MMHG

## 2019-05-04 LAB
ALBUMIN SERPL BCP-MCNC: 3.1 G/DL (ref 3.5–5.2)
ALP SERPL-CCNC: 60 U/L (ref 55–135)
ALT SERPL W/O P-5'-P-CCNC: 8 U/L (ref 10–44)
ANION GAP SERPL CALC-SCNC: 8 MMOL/L (ref 8–16)
AST SERPL-CCNC: 17 U/L (ref 10–40)
BASOPHILS # BLD AUTO: 0.03 K/UL (ref 0–0.2)
BASOPHILS NFR BLD: 0.5 % (ref 0–1.9)
BILIRUB SERPL-MCNC: 0.8 MG/DL (ref 0.1–1)
BUN SERPL-MCNC: 14 MG/DL (ref 8–23)
CALCIUM SERPL-MCNC: 9.5 MG/DL (ref 8.7–10.5)
CHLORIDE SERPL-SCNC: 106 MMOL/L (ref 95–110)
CO2 SERPL-SCNC: 27 MMOL/L (ref 23–29)
CREAT SERPL-MCNC: 1.1 MG/DL (ref 0.5–1.4)
DIFFERENTIAL METHOD: ABNORMAL
EOSINOPHIL # BLD AUTO: 0.4 K/UL (ref 0–0.5)
EOSINOPHIL NFR BLD: 6.2 % (ref 0–8)
ERYTHROCYTE [DISTWIDTH] IN BLOOD BY AUTOMATED COUNT: 14.5 % (ref 11.5–14.5)
EST. GFR  (AFRICAN AMERICAN): 55.6 ML/MIN/1.73 M^2
EST. GFR  (NON AFRICAN AMERICAN): 48.2 ML/MIN/1.73 M^2
GLUCOSE SERPL-MCNC: 99 MG/DL (ref 70–110)
HCT VFR BLD AUTO: 33.4 % (ref 37–48.5)
HGB BLD-MCNC: 10.2 G/DL (ref 12–16)
IMM GRANULOCYTES # BLD AUTO: 0.01 K/UL (ref 0–0.04)
IMM GRANULOCYTES NFR BLD AUTO: 0.2 % (ref 0–0.5)
LYMPHOCYTES # BLD AUTO: 1.5 K/UL (ref 1–4.8)
LYMPHOCYTES NFR BLD: 22.8 % (ref 18–48)
MAGNESIUM SERPL-MCNC: 1.5 MG/DL (ref 1.6–2.6)
MCH RBC QN AUTO: 24.4 PG (ref 27–31)
MCHC RBC AUTO-ENTMCNC: 30.5 G/DL (ref 32–36)
MCV RBC AUTO: 80 FL (ref 82–98)
MONOCYTES # BLD AUTO: 0.3 K/UL (ref 0.3–1)
MONOCYTES NFR BLD: 4.5 % (ref 4–15)
NEUTROPHILS # BLD AUTO: 4.3 K/UL (ref 1.8–7.7)
NEUTROPHILS NFR BLD: 65.8 % (ref 38–73)
NRBC BLD-RTO: 0 /100 WBC
PHOSPHATE SERPL-MCNC: 3.4 MG/DL (ref 2.7–4.5)
PLATELET # BLD AUTO: 325 K/UL (ref 150–350)
PMV BLD AUTO: 10.1 FL (ref 9.2–12.9)
POCT GLUCOSE: 108 MG/DL (ref 70–110)
POCT GLUCOSE: 178 MG/DL (ref 70–110)
POTASSIUM SERPL-SCNC: 4 MMOL/L (ref 3.5–5.1)
PROT SERPL-MCNC: 7.6 G/DL (ref 6–8.4)
RBC # BLD AUTO: 4.18 M/UL (ref 4–5.4)
SODIUM SERPL-SCNC: 141 MMOL/L (ref 136–145)
WBC # BLD AUTO: 6.48 K/UL (ref 3.9–12.7)

## 2019-05-04 PROCEDURE — 83735 ASSAY OF MAGNESIUM: CPT

## 2019-05-04 PROCEDURE — 63600175 PHARM REV CODE 636 W HCPCS: Performed by: STUDENT IN AN ORGANIZED HEALTH CARE EDUCATION/TRAINING PROGRAM

## 2019-05-04 PROCEDURE — 84100 ASSAY OF PHOSPHORUS: CPT

## 2019-05-04 PROCEDURE — 85025 COMPLETE CBC W/AUTO DIFF WBC: CPT

## 2019-05-04 PROCEDURE — 80053 COMPREHEN METABOLIC PANEL: CPT

## 2019-05-04 PROCEDURE — 25000003 PHARM REV CODE 250: Performed by: STUDENT IN AN ORGANIZED HEALTH CARE EDUCATION/TRAINING PROGRAM

## 2019-05-04 PROCEDURE — 99223 1ST HOSP IP/OBS HIGH 75: CPT | Mod: ,,, | Performed by: INTERNAL MEDICINE

## 2019-05-04 PROCEDURE — 99223 PR INITIAL HOSPITAL CARE,LEVL III: ICD-10-PCS | Mod: ,,, | Performed by: INTERNAL MEDICINE

## 2019-05-04 PROCEDURE — 36415 COLL VENOUS BLD VENIPUNCTURE: CPT

## 2019-05-04 RX ORDER — DOXYCYCLINE HYCLATE 100 MG
100 TABLET ORAL 2 TIMES DAILY
Qty: 56 TABLET | Refills: 0 | Status: SHIPPED | OUTPATIENT
Start: 2019-05-04 | End: 2019-06-01

## 2019-05-04 RX ORDER — OXYCODONE AND ACETAMINOPHEN 5; 325 MG/1; MG/1
1 TABLET ORAL EVERY 4 HOURS PRN
Qty: 15 TABLET | Refills: 0 | Status: SHIPPED | OUTPATIENT
Start: 2019-05-04 | End: 2023-02-15

## 2019-05-04 RX ORDER — LEVOFLOXACIN 750 MG/1
750 TABLET ORAL DAILY
Qty: 28 TABLET | Refills: 0 | Status: SHIPPED | OUTPATIENT
Start: 2019-05-04 | End: 2019-06-01

## 2019-05-04 RX ADMIN — PIPERACILLIN AND TAZOBACTAM 4.5 G: 4; .5 INJECTION, POWDER, LYOPHILIZED, FOR SOLUTION INTRAVENOUS; PARENTERAL at 05:05

## 2019-05-04 NOTE — PLAN OF CARE
Problem: Adult Inpatient Plan of Care  Goal: Plan of Care Review  Outcome: Ongoing (interventions implemented as appropriate)  POC reviewed with patient and verbalizes understanding. AAOx4. VSS on RA. C/o pain resolved with PRN pain med. No c/o nausea. Right groin incision dressing is clean, dry, and intact. Left AC PIV is patent and flushes well. IVF @ 75 ml/hr. Patient remains NPO except meds. Ambulates independently with her walker to the bathroom. Blood sugar checks continue every 6 hours and remain WNLs. No falls or acute events. Call light within reach. Bed low and locked. WCTM..

## 2019-05-04 NOTE — NURSING
Patient is discharging home. All discharge instructions have been discussed with patient. No questions or concerns at this time. PIV has been discontinued. Awaiting wheelchair transportation.

## 2019-05-04 NOTE — ASSESSMENT & PLAN NOTE
79 y/o F w/ HTN, DMII, hx of DVT, CHF, PAD present w/ concerns of small amount of purulent drainage from groin wound.  Pt  w/ prior fem-fem 20 years prior that was occluded and underwent R iliofemoral bypass 3/20/19 c/b wound dehiscence and she underwent washout on 4/12. Wound cultures from swab prior to sx +Proteus. Surgical cultures +genital damir.  Pt discharged on levofloxacin 500mg po q 48hr for skin/soft tissue infection which she reports compliance to.    CT:Status post right iliac-femoral artery bypass with continued focal narrowing at its origin and persistent soft tissues stranding along the course of the iliac graft similar to prior exams dating back to CT 03/24/2019.  Stable fusiform dilatation or pseudoaneurysm in the right groin near the femoral artery anastomosis with soft tissue thickening and small fluid collection in the right groin which may represent a sterile or infected collection.    Pt started on zosyn. She is afebrile, stable, without a leukocytosis.  Now s/p I and D on 5/3 per brief op note-no purulence noted.  Cxs taken.     Plan:  1. Recommend levaquin 750 mg q 48 hours and doxycycline 100 mg PO BID for an additional 4 weeks  2. F/u with vascular surgery and ID upon discharge  3. Discussed case with vascular- seen with ID staff.

## 2019-05-04 NOTE — CONSULTS
[facility]  Infectious Disease  Progress Note    Patient Name: Nelsy Marino  MRN: [id number]  Admission Date: 5/2/2019  Length of Stay: 2 days  Attending Physician: No att. providers found  Primary Care Provider: Juan F Garay    Isolation Status: No active isolations  Assessment/Plan:      Abscess of right groin  77 y/o F w/ HTN, DMII, hx of DVT, CHF, PAD present w/ concerns of small amount of purulent drainage from groin wound.  Pt  w/ prior fem-fem 20 years prior that was occluded and underwent R iliofemoral bypass 3/20/19 c/b wound dehiscence and she underwent washout on 4/12. Wound cultures from swab prior to sx +Proteus. Surgical cultures +genital damir.  Pt discharged on levofloxacin 500mg po q 48hr for skin/soft tissue infection which she reports compliance to.    CT:Status post right iliac-femoral artery bypass with continued focal narrowing at its origin and persistent soft tissues stranding along the course of the iliac graft similar to prior exams dating back to CT 03/24/2019.  Stable fusiform dilatation or pseudoaneurysm in the right groin near the femoral artery anastomosis with soft tissue thickening and small fluid collection in the right groin which may represent a sterile or infected collection.    Pt started on zosyn. She is afebrile, stable, without a leukocytosis.  Now s/p I and D on 5/3 per brief op note-no purulence noted.  Cxs taken.     Plan:  1. Recommend levaquin 750 mg q 48 hours and doxycycline 100 mg PO BID for an additional 4 weeks  2. F/u with vascular surgery and ID upon discharge  3. Discussed case with vascular- seen with ID staff.         Thank you for your consult. I will sign off. Please contact us if you have any additional questions.  TIFFANIE Vargas Pager: [phone]  Infectious Disease  [facility]    Subjective:     Principal Problem:Fluid collection at surgical site    HPI: 77 y/o F w/ HTN, DMII, hx of DVT, CHF, PAD present w/  concerns of small amount of purulent drainage from groin wound.  Pt  w/ prior fem-fem 20 years prior that was occluded and underwent R iliofemoral bypass 3/20/19 c/b wound dehiscence and she underwent washout on 4/12. Wound cultures from swab prior to sx +Proteus. Surgical cultures +genital damir.  Pt discharged on levofloxacin 500mg po q 48hr for skin/soft tissue infection which she reports compliance to.    CT:Status post right iliac-femoral artery bypass with continued focal narrowing at its origin and persistent soft tissues stranding along the course of the iliac graft similar to prior exams dating back to CT 03/24/2019.  Stable fusiform dilatation or pseudoaneurysm in the right groin near the femoral artery anastomosis with soft tissue thickening and small fluid collection in the right groin which may represent a sterile or infected collection.      Pt started on zosyn. She is afebrile, stable, without a leukocytosis.  Now s/p I and D on 5/3 per brief op note-no purulence noted.  Cxs taken. Pt denies pain. No issues.             Past Medical History:   Diagnosis Date    CHF (congestive heart failure)     Diabetes mellitus     DVT (deep venous thrombosis)     Hypertension     Miscarriage        Past Surgical History:   Procedure Laterality Date     femoral vascular surgery       CREATION, BYPASS, ARTERIAL, ILIAC TO FEMORAL, RIGHT LOWER EXTREMITY, RIGHT PROFUNDAPLASTY Right 3/20/2019    Performed by [facility] at [facility] OR    HYSTERECTOMY      ?unsure cervix present    Incision and Drainage - R groin washout, possible muscle flap Right 4/12/2019    Performed by [facility] at [facility] OR [address] [place] [address]       Review of patient's allergies indicates:   Allergen Reactions    Motrin [ibuprofen] Itching       Medications:  No medications prior to admission.     Antibiotics (From admission, onward)    Start     Stop Route Frequency Ordered    05/02/19 8660  piperacillin-tazobactam 4.5 g in sodium  chloride 0.9% 100 mL IVPB (ready to mix system)      -- IV Every 8 hours (non-standard times) 19 1713        Antifungals (From admission, onward)    None        Antivirals (From admission, onward)    None             There is no immunization history on file for this patient.    Family History     Problem Relation (Age of Onset)    Diabetes Father    Hypertension Father        Social History     Socioeconomic History    Marital status:      Spouse name: Not on file    Number of children: Not on file    Years of education: Not on file    Highest education level: Not on file   Occupational History    Not on file   Social Needs    Financial resource strain: Not on file    Food insecurity:     Worry: Not on file     Inability: Not on file    Transportation needs:     Medical: Not on file     Non-medical: Not on file   Tobacco Use    Smoking status: Former Smoker     Last attempt to quit: 1980     Years since quittin.3    Smokeless tobacco: Never Used   Substance and Sexual Activity    Alcohol use: No    Drug use: No    Sexual activity: Never   Lifestyle    Physical activity:     Days per week: Not on file     Minutes per session: Not on file    Stress: Not on file   Relationships    Social connections:     Talks on phone: Not on file     Gets together: Not on file     Attends [employer] service: Not on file     Active member of club or organization: Not on file     Attends meetings of clubs or organizations: Not on file     Relationship status: Not on file   Other Topics Concern    Not on file   Social History Narrative    Not on file     Review of Systems   Constitutional: Negative for chills and fever.   Respiratory: Negative for cough and shortness of breath.    Cardiovascular: Negative for chest pain and leg swelling.   Gastrointestinal: Negative for abdominal pain, constipation, diarrhea, nausea and vomiting.   Genitourinary: Negative for dysuria, frequency and hematuria.    Musculoskeletal: Negative for back pain and myalgias.   Skin: Negative for rash and wound.   Neurological: Negative for dizziness, weakness, light-headedness, numbness and headaches.   Psychiatric/Behavioral: Negative for agitation and behavioral problems. The patient is not nervous/anxious.      Objective:     Vital Signs (Most Recent):  Temp: 98.6 °F (37 °C) (05/04/19 1138)  Pulse: 83 (05/04/19 1138)  Resp: 17 (05/04/19 1138)  BP: (!) 150/62 (05/04/19 1138)  SpO2: 96 % (05/04/19 1138) Vital Signs (24h Range):  Temp:  [97.8 °F (36.6 °C)-98.6 °F (37 °C)] 98.6 °F (37 °C)  Pulse:  [69-92] 83  Resp:  [15-20] 17  SpO2:  [89 %-99 %] 96 %  BP: (122-150)/(59-65) 150/62     Weight: 74.4 kg (164 lb)  Body mass index is 32.03 kg/m².    Estimated Creatinine Clearance: 38 mL/min (based on SCr of 1.1 mg/dL).    Physical Exam   Constitutional: She is oriented to person, place, and time. She appears well-developed and well-nourished. No distress.   Cardiovascular: Normal rate and regular rhythm.   No murmur heard.  Pulmonary/Chest: Effort normal and breath sounds normal. No respiratory distress.   Abdominal: Soft. Bowel sounds are normal. She exhibits no distension.   Musculoskeletal: Normal range of motion. She exhibits no edema.   Neurological: She is alert and oriented to person, place, and time.   Skin: Skin is warm and dry.   Psychiatric: She has a normal mood and affect. Her behavior is normal.       Significant Labs:   Blood Culture:   Recent Labs   Lab 04/11/19  2043 04/11/19  2044 05/02/19  1539 05/02/19  1544   LABBLOO No growth after 5 days. No growth after 5 days. No Growth to date  No Growth to date No Growth to date  No Growth to date     CBC:   Recent Labs   Lab 05/02/19  1253 05/03/19  0514 05/04/19  0758   WBC  --  7.23 6.48   HGB  --  9.6* 10.2*   HCT 32* 31.6* 33.4*   PLT  --  311 325     CMP:   Recent Labs   Lab 05/02/19  1549 05/03/19  0514 05/04/19  0758    139 141   K 4.0 3.8 4.0    105 106    CO2 23 25 27   * 142* 99   BUN 24* 19 14   CREATININE 1.3 1.1 1.1   CALCIUM 10.1 9.3 9.5   PROT 8.5*  --  7.6   ALBUMIN 3.4*  --  3.1*   BILITOT 0.5  --  0.8   ALKPHOS 71  --  60   AST 21  --  17   ALT 10  --  8*   ANIONGAP 10 9 8   EGFRNONAA 39.4* 48.2* 48.2*     Wound Culture:   Recent Labs   Lab 04/11/19  2045 04/12/19  1741 05/03/19  1306   LABAERO PROTEUS MIRABILIS  Many  Skin damir also present   Genital damir, no predominant organism No growth  No growth       Significant Imaging: I have reviewed all pertinent imaging results/findings within the past 24 hours.

## 2019-05-04 NOTE — SUBJECTIVE & OBJECTIVE
Past Medical History:   Diagnosis Date    CHF (congestive heart failure)     Diabetes mellitus     DVT (deep venous thrombosis)     Hypertension     Miscarriage        Past Surgical History:   Procedure Laterality Date     femoral vascular surgery       CREATION, BYPASS, ARTERIAL, ILIAC TO FEMORAL, RIGHT LOWER EXTREMITY, RIGHT PROFUNDAPLASTY Right 3/20/2019    Performed by Danny [facility]bar [facility] at [facility] OR    HYSTERECTOMY      ?unsure cervix present    Incision and Drainage - R groin washout, possible muscle flap Right 2019    Performed [facility] at [facility] OR [address] [place] [address]       Review of patient's allergies indicates:   Allergen Reactions    Motrin [ibuprofen] Itching       Medications:  No medications prior to admission.     Antibiotics (From admission, onward)    Start     Stop Route Frequency Ordered    19 2245  piperacillin-tazobactam 4.5 g in sodium chloride 0.9% 100 mL IVPB (ready to mix system)      -- IV Every 8 hours (non-standard times) 19 1713        Antifungals (From admission, onward)    None        Antivirals (From admission, onward)    None             There is no immunization history on file for this patient.    Family History     Problem Relation (Age of Onset)    Diabetes Father    Hypertension Father        Social History     Socioeconomic History    Marital status:      Spouse name: Not on file    Number of children: Not on file    Years of education: Not on file    Highest education level: Not on file   Occupational History    Not on file   Social Needs    Financial resource strain: Not on file    Food insecurity:     Worry: Not on file     Inability: Not on file    Transportation needs:     Medical: Not on file     Non-medical: Not on file   Tobacco Use    Smoking status: Former Smoker     Last attempt to quit: 1980     Years since quittin.3    Smokeless tobacco: Never Used   Substance and Sexual Activity    Alcohol use: No    Drug  use: No    Sexual activity: Never   Lifestyle    Physical activity:     Days per week: Not on file     Minutes per session: Not on file    Stress: Not on file   Relationships    Social connections:     Talks on phone: Not on file     Gets together: Not on file     Attends Spiritism service: Not on file     Active member of club or organization: Not on file     Attends meetings of clubs or organizations: Not on file     Relationship status: Not on file   Other Topics Concern    Not on file   Social History Narrative    Not on file     Review of Systems   Constitutional: Negative for chills and fever.   Respiratory: Negative for cough and shortness of breath.    Cardiovascular: Negative for chest pain and leg swelling.   Gastrointestinal: Negative for abdominal pain, constipation, diarrhea, nausea and vomiting.   Genitourinary: Negative for dysuria, frequency and hematuria.   Musculoskeletal: Negative for back pain and myalgias.   Skin: Negative for rash and wound.   Neurological: Negative for dizziness, weakness, light-headedness, numbness and headaches.   Psychiatric/Behavioral: Negative for agitation and behavioral problems. The patient is not nervous/anxious.      Objective:     Vital Signs (Most Recent):  Temp: 98.6 °F (37 °C) (05/04/19 1138)  Pulse: 83 (05/04/19 1138)  Resp: 17 (05/04/19 1138)  BP: (!) 150/62 (05/04/19 1138)  SpO2: 96 % (05/04/19 1138) Vital Signs (24h Range):  Temp:  [97.8 °F (36.6 °C)-98.6 °F (37 °C)] 98.6 °F (37 °C)  Pulse:  [69-92] 83  Resp:  [15-20] 17  SpO2:  [89 %-99 %] 96 %  BP: (122-150)/(59-65) 150/62     Weight: 74.4 kg (164 lb)  Body mass index is 32.03 kg/m².    Estimated Creatinine Clearance: 38 mL/min (based on SCr of 1.1 mg/dL).    Physical Exam   Constitutional: She is oriented to person, place, and time. She appears well-developed and well-nourished. No distress.   Cardiovascular: Normal rate and regular rhythm.   No murmur heard.  Pulmonary/Chest: Effort normal and  breath sounds normal. No respiratory distress.   Abdominal: Soft. Bowel sounds are normal. She exhibits no distension.   Musculoskeletal: Normal range of motion. She exhibits no edema.   Neurological: She is alert and oriented to person, place, and time.   Skin: Skin is warm and dry.   Psychiatric: She has a normal mood and affect. Her behavior is normal.       Significant Labs:   Blood Culture:   Recent Labs   Lab 04/11/19  2043 04/11/19  2044 05/02/19  1539 05/02/19  1544   LABBLOO No growth after 5 days. No growth after 5 days. No Growth to date  No Growth to date No Growth to date  No Growth to date     CBC:   Recent Labs   Lab 05/02/19  1253 05/03/19  0514 05/04/19  0758   WBC  --  7.23 6.48   HGB  --  9.6* 10.2*   HCT 32* 31.6* 33.4*   PLT  --  311 325     CMP:   Recent Labs   Lab 05/02/19  1549 05/03/19  0514 05/04/19  0758    139 141   K 4.0 3.8 4.0    105 106   CO2 23 25 27   * 142* 99   BUN 24* 19 14   CREATININE 1.3 1.1 1.1   CALCIUM 10.1 9.3 9.5   PROT 8.5*  --  7.6   ALBUMIN 3.4*  --  3.1*   BILITOT 0.5  --  0.8   ALKPHOS 71  --  60   AST 21  --  17   ALT 10  --  8*   ANIONGAP 10 9 8   EGFRNONAA 39.4* 48.2* 48.2*     Wound Culture:   Recent Labs   Lab 04/11/19  2045 04/12/19  1741 05/03/19  1306   LABAERO PROTEUS MIRABILIS  Many  Skin damir also present   Genital damir, no predominant organism No growth  No growth       Significant Imaging: I have reviewed all pertinent imaging results/findings within the past 24 hours.

## 2019-05-04 NOTE — DISCHARGE SUMMARY
[facility]  Vascular Surgery  Discharge Summary      Patient Name: Nelsy Marino  MRN: [id number]  Admission Date: 5/2/2019  Hospital Length of Stay: 2 days  Discharge Date and Time:  05/04/2019 11:48 AM  Attending Physician: Danny Dunbar MD   Discharging Provider: DEVYN [place] MD  Primary Care Provider: [facility]    HPI:   78 y F with PAD, HTN, DM, CHF, prior fem-fem 20 yrs ago now occluded, R iliofemoral bypass 3/20/19 complicated by wound dehiscence s/p washout.    Procedures:  3/20/19: s/p R iliofemoral bypass 3/20/19  4/12/19: R groin washout    In clinic several days ago it was noted that the debrided area was clean and healing well. CTA showed the fluid collection has decreased in size. She reports she completed her PO abx, and denies RLE pain. However, there appeared to be an abscess at the inferior aspect of the wound with a small amount of purulent drainage.      Procedure(s) (LRB):  Incision and Drainage R groin (Right)     Hospital Course: 5/3: to OR today for washout of R groin incision without purulent drainage. Has been Afebrile without leukocytosis. BCx remain negative.   5/4: Doing well. Gram stain did now show organisms. Ready for discharge with abx. Will change dressing daily.    Physical Exam:  General: well-appearing, well nourished, afebrile  Neuro: AAOx3  Cardiac: normal rate; good perfusion  Pulm: unlabored; normal effort  Abdomen: soft, non-tender  Extremities: right groin incision loosely closed with no purulent drainage, bandage clean dry and intact, no lower extremity edema      Consults:   Consults (From admission, onward)        Status Ordering Provider     Inpatient consult to Infectious Diseases  Once     Provider:  (Not yet assigned)    Completed ORALIA WHITE     Inpatient consult to Social Work  Once     Provider:  (Not yet assigned)    Acknowledged CHERELLE MEDEL     Inpatient consult to Vascular Surgery  Once     Provider:  (Not yet assigned)     Completed ANJEL WALLS          Significant Diagnostic Studies: see chart    Pending Diagnostic Studies:     None        Final Active Diagnoses:    Diagnosis Date Noted POA    PRINCIPAL PROBLEM:  Fluid collection at surgical site [T88.8XXA]  Yes    Abscess of right groin [L02.214]  Yes    PVD (peripheral vascular disease) [I73.9] 03/20/2019 Yes     Chronic      Problems Resolved During this Admission:      Discharged Condition: good    Disposition: Home or Self Care    Follow Up:  Follow-up Information     [facility].    Why:  Outpatient Services  Contact information:  [address] [place] [id number]  [phone]             Danny Dunbar MD In 1 week.    Specialties:  Vascular Surgery, Surgery  Why:  For wound re-check  Contact information:  [address]  [address]  [place] [id number] [phone]                   Patient Instructions:      Lifting restrictions   Order Comments: No lifting greater than weight of a jug of milk for four weeks.     Change dressing (specify)   Order Comments: Dressing change: once daily     Activity as tolerated     Medications:  Reconciled Home Medications:      Medication List      START taking these medications    doxycycline 100 MG tablet  Commonly known as:  VIBRA-TABS  Take 1 tablet (100 mg total) by mouth 2 (two) times daily.        CHANGE how you take these medications    levoFLOXacin 750 MG tablet  Commonly known as:  LEVAQUIN  Take 1 tablet (750 mg total) by mouth once daily.  What changed:    · medication strength  · how much to take  · when to take this     oxyCODONE-acetaminophen 5-325 mg per tablet  Commonly known as:  PERCOCET  Take 1 tablet by mouth every 4 (four) hours as needed.  What changed:    · when to take this  · reasons to take this        CONTINUE taking these medications    amLODIPine 10 MG tablet  Commonly known as:  NORVASC  Take 10 mg by mouth once daily.     aspirin 81 MG EC tablet  Commonly known as:  ECOTRIN  Take 81 mg by  "mouth once daily.     cilostazol 50 MG Tab  Commonly known as:  PLETAL  Take 1 tablet (50 mg total) by mouth 2 (two) times daily. If patient tolerate medication after 4 weeks, increase dose to 100 mg twice daily.     glipiZIDE 10 MG tablet  Commonly known as:  GLUCOTROL  Take 10 mg by mouth 2 (two) times daily before meals.     * insulin aspart protamine-insulin aspart 100 unit/mL (70-30) Inpn pen  Commonly known as:  NovoLOG 70/30  Inject 45 Units into the skin every morning.     * NovoLOG Mix 70-30FlexPen U-100 100 unit/mL (70-30) Inpn pen  Generic drug:  insulin aspart protamine-insulin aspart  Inject 40 Units into the skin every evening.     insulin syringe-needle,dispos. 0.3 mL 30 gauge x 5/16" Syrg  by Misc.(Non-Drug; Combo Route) route.     losartan-hydrochlorothiazide 100-25 mg 100-25 mg per tablet  Commonly known as:  HYZAAR  Take 1 tablet by mouth once daily.     metFORMIN 1000 MG tablet  Commonly known as:  GLUCOPHAGE  Take 1,000 mg by mouth 2 (two) times daily with meals.     TRUE METRIX GLUCOSE TEST STRIP MISC  by Misc.(Non-Drug; Combo Route) route.         * This list has 2 medication(s) that are the same as other medications prescribed for you. Read the directions carefully, and ask your doctor or other care provider to review them with you.                DEVYN [place] MD  Vascular Surgery  [facility]  "

## 2019-05-05 NOTE — PATIENT INSTRUCTIONS
Understanding Peripheral Arterial Disease    Peripheral arteries deliver oxygen-rich blood to the tissues outside the heart. As you age, your arteries become stiffer and thicker. In addition, risk factors, such as smoking and high cholesterol, can damage the artery lining. This allows a buildup of fat and other materials (plaque) to form within the artery walls. The buildup of plaque narrows the space inside the artery and sometimes blocks blood flow. Peripheral arterial disease (PAD) happens when blood flow through the arteries is reduced because of plaque buildup. It often happens in the legs and feet, but can also happen elsewhere in the body. If this buildup happens in the a large artery in the neck (carotid artery), it can be a major contributor to stroke.  A healthy artery  An artery is a muscular tube that carries oxgen rich blood and nutrients from the heart to the rest of the body. It has a smooth lining and flexible walls that allow blood to pass freely. When active, muscles need more oxygen. This increases blood flow. Healthy arteries can adapt to meet this need.  A damaged artery    PAD begins when the lining of an artery is damaged. This is often because of risk factors, such as smoking, older age, or diabetes. Plaque then starts to form within the artery wall. At this stage, blood flows normally, so youre not likely to have symptoms.  A narrowed artery    If plaque continues to build up, the space inside the artery narrows. The artery walls become less able to expand. The artery still provides enough blood and oxygen to your muscles during rest. But when youre active, the increased demand for blood cant be met. As a result, your leg may cramp or ache when you walk.  A blocked artery    An artery can become blocked by plaque or by a blood clot lodged in a narrowed section. When this happens, oxygen cant reach the muscle below the blockage. Then you may feel pain when lying down or when you are not  active (rest pain). This type of pain is especially common at night when youre lying flat. In time, the affected tissue can die. This can lead to the loss of a toe or foot.  Date Last Reviewed: 5/1/2016 © 2000-2017 [employer]. [address] [place] [id number]. All rights reserved. This information is not intended as a substitute for professional medical care. Always follow your healthcare professional's instructions.        Peripheral Artery Disease (PAD)     Peripheral artery disease (PAD) occurs when the arteries that carry blood to the limbs are narrowed or blocked. This is usually due to a buildup of a fatty substance called plaque in the walls of the arteries.  PAD most often affects the arteries in the legs. When these arteries are narrowed or blocked, blood flow to the legs is reduced. This can cause leg and foot pain and other symptoms. If severe enough, reduced blood flow to the legs can lead to tissue death (gangrene) and the loss of a toe, foot, or leg. Having PAD also makes it more likely that arteries in other body areas are blocked. For instance, arteries that carry blood to the heart or brain may be affected. This raises the chances of heart attack and stroke.  Risk factors  Certain factors can make PAD more likely. They include:  · Smoking  · Diabetes  · High blood pressure  · Unhealthy cholesterol levels  · Obesity  · Inactive lifestyle  · Older age  · Family history of PAD  Symptoms  Many people with PAD have no symptoms. If symptoms do occur, they can include:  · Pain in the muscles of the calves, thighs or hips that gets worse with activity and better with rest (intermittent claudication)  · Achy, tired, or heavy feeling in the legs  · Weakness, numbness, tingling, or loss of feeling in the legs  · Changes in skin color of the legs  · Sores on the legs and feet  · Cold leg, feet, or toes  · Pain the feet or toes even when lying down (rest pain)  Home care  PAD is a  chronic (lifelong) condition. Treatment is focused on managing your condition and lowering your health risks. This may include doing the following:  · If you smoke, quit. This helps prevent further damage to your arteries and lowers your health risks. Ask your provider about medicines or products that can help you quit smoking. Also consider joining a stop-smoking program or support group.  · Be more active. This helps you lose weight and manage problems such as high blood pressure and unhealthy cholesterol levels. Start a walking program if advised to by your provider. Your provider may also help you form a safe exercise program that is right for your needs.  · Make healthy eating changes. This includes eating less fat, salt, and sugar.  · Take medicines for high blood pressure, unhealthy cholesterol levels, and diabetes as directed.  · Have your blood pressure and cholesterol levels checked as often as directed.  · If you have diabetes, try to keep your blood sugar well controlled. Test your blood sugar as directed.  · If you are overweight, talk to your provider about forming a weight-loss plan.  · Watch for cuts, scrapes, or open sores on your feet. Poor blood flow to the feet may slow healing and increase the risk of infection from these problems.   Follow-up care  Follow up with your healthcare provider, or as advised. If imaging tests such as ultrasound were done, they will be reviewed by a doctor. You will be told the results and any new findings that may affect your care.  When to seek medical advice   Call your healthcare provider right away if any of these occur:  · Sudden severe pain in the legs or feet  · Sudden cold, pale or blue color in the legs or feet  · Weakness or numbness in the legs or feet that worsens  · Any sore or wound in the legs or feet that wont heal  · Weak pulse in your legs or feet  Know the Signs of Heart Attack and Stroke  People with PAD are at high risk for heart attack and  stroke. Knowing the signs of these problems can help you protect your health and get help when you need it. Call 911 right away if you have any of the following:  Signs of a Heart Attack  · Chest discomfort, such as pain, aching, tightness, or pressure that lasts more than a few minutes, or that comes and goes  · Pain or discomfort in the arms, back, shoulders, neck, or jaw  · Shortness of breath  · Sweating (often a cold, clammy sweat)  · Nausea  · Lightheadedness  Signs of a Stroke  · Sudden numbness or weakness of the face, arms, or legs, especially on one side  · Sudden confusion or trouble speaking or understanding  · Sudden trouble seeing in one or both eyes  · Sudden trouble walking, dizziness, or loss of balance  · Sudden, severe headache with no known cause   Date Last Reviewed: 9/21/2015  © 3048-0636 [employer]. [address] [place] [address]. All rights reserved. This information is not intended as a substitute for professional medical care. Always follow your healthcare professional's instructions.        A Walking Program for Peripheral Arterial Disease (PAD)  Peripheral arterial disease (PAD) is a condition where the arteries in the legs are severely damaged. When you have PAD, walking can be painful. So you may start to walk less. Walking less makes your leg muscles weaker. It then becomes harder and more painful to walk. A walking program can break this cycle. This sheet gives you tips on how to get started.     Walking farther without pain  Exercise strengthens leg muscles that are out of shape. It also trains these muscles to work with less oxygen. This helps your leg muscles work better even with reduced blood flow to your legs. When you have PAD, a walking program can be helpful. Your program can:  · Let you walk longer and farther without claudication. This is an ache in your legs during exercise that goes away with rest.  · Let you do more and be more active  · Add to  your overall health and well-being  · Help you control your blood sugar and blood pressure  · Help you become healthier with no risk and at little or no cost  Getting started  Your local hospital, vascular center, or cardiac rehab center may have a special walking program for people with PAD. If so, this is your best option. But if you cant find a program, or its not covered by insurance, you can still walk on your own. Follow these steps at each session:  · Step 1. Start at a pace that lets you walk for 5 to 10 minutes before you start to feel claudication. This feeling is unpleasant, but it doesnt hurt you. Keep going until the pain makes you feel that you need to stop.  · Step 2. Stop and rest for 3 to 5 minutes, just long enough for the pain to go away. You can rest standing or sitting. (Some people like to bring along a cane or a lightweight folding chair.)  · Step 3. Again walk at a pace that lets you walk for 5 to 10 minutes more before you feel pain. This may be slower than your starting pace in step 1. Then rest again.  · Step 4. Repeat this process until youve walked for 45 minutes. This should be about 60 to 80 minutes total, including resting time. You may not be able to do a full 45 minutes at first. Do as much as you can, and increase your time as you improve.  Making the most of your program  · Walk at least 3 times a week. Take no more than 2 days off between sessions. If you stop walking, even for a week or two, you can lose the health benefits of your program.  · Find a good place to walk. A treadmill or a track may be better at first. That way, you wont run the risk of going too far and finding that you cant walk back. Be sure to have a place to walk indoors in bad weather, such as a gym or a mall.  · Wear shoes with sturdy, flexible soles. The heel should fit without slipping. You should have room to wiggle your toes.  · Keep track of how long you walk. A pedometer will show your daily  progress. It can also show how much farther you can walk as time goes by.  · Ask a friend to keep you company. You may enjoy walking with someone else. Or you may want to make your walking sessions a time to relax by yourself.  · Make it fun. Listen to music while you walk and rest. Walk in a favorite park. Get to know the people at the gym, or the folks that you pass on your route. While you rest, you can window-shop, read, or feed the birds. Do whatever will help you enjoy your exercise sessions.  Date Last Reviewed: 6/1/2016  © 3349-8593 [employer]. [address] [place] [address], [place] [address]. All rights reserved. This information is not intended as a substitute for professional medical care. Always follow your healthcare professional's instructions.

## 2019-05-05 NOTE — PROGRESS NOTES
Danny Dunbar MD [facility]                         05/05/2019    HPI:  Nelsy Marino is a 78 y.o. female with   Patient Active Problem List   Diagnosis    Lichen sclerosus et atrophicus    PVD (peripheral vascular disease)    Essential hypertension    Type 2 diabetes mellitus    Diastolic CHF, chronic    Obesity (BMI 30-39.9)    History of DVT (deep vein thrombosis)    Wound dehiscence    Fluid collection at surgical site    Cellulitis    Postoperative seroma of musculoskeletal structure after non-musculoskeletal procedure    Abscess of right groin    being managed by PCP and specialists who is here today for evaluation of RLE pain.  She is s/p fem-fem approx 20 yrs ago without issues until recently.  Fem-fem that was performed for RLE ALI was noted to be occluded on US at [facility].  Was scheduled for an angiogram at [facility] although was unable to proceed due to insurance purposes.  Patient states location is R calf and thigh occurring for 2 mo.  Associated signs and symptoms include none.  Quality is throbbing and severity is 9/10 at worst.  Symptoms began 2 mo ago.  Alleviating factors include rest.  Worsening factors include ambulation.  Pt states RLE claudication x 1 block.  States it is lifestyle limiting.     no MI  no Stroke  Tobacco use: quit 35 yrs ago    1/14/19: cont to c/o RLE pain, 1/2 block calf claudication.  Intermittent rest pain, no tissue loss.  Compliant with Pletal, has not noticed significant improvement.    2/14/19: Cont to experience 1/2 block R calf claudication.  No wounds.      4/8/19: s/p R iliofemoral bypass 3/20/19.  C/o R groin inc dehiscence by LUCIUS RN.  No F/C.  States RLE ischemic rest pain resolved, +ambulating better without issues.    4/11/19: Presents for further eval of R abundio inc dehiscence.  No F/C.    4/29/19: No new issues, states took all of PO Abx earlier than rec duration of 2 weeks by ID, no F/C.   States wound healing although has not used soap.  No RLE claudication or rest pain, no wounds to R foot.  States RLE pain resolved.    Interval history:  On PO Abx.  Denies F/C.  Keeping clean with soap and water now.    Past Medical History:   Diagnosis Date    CHF (congestive heart failure)     Diabetes mellitus     DVT (deep venous thrombosis)     Hypertension     Miscarriage      Past Surgical History:   Procedure Laterality Date     femoral vascular surgery       CREATION, BYPASS, ARTERIAL, ILIAC TO FEMORAL, RIGHT LOWER EXTREMITY, RIGHT PROFUNDAPLASTY Right 3/20/2019    Performed by Danny Dunbar MD at [facility]    HYSTERECTOMY      ?unsure cervix present    Incision and Drainage - R groin washout, possible muscle flap Right 2019    Per[facility] [address] [place] [address]     Family History   Problem Relation Age of Onset    Diabetes Father     Hypertension Father      Social History     Socioeconomic History    Marital status:      Spouse name: Not on file    Number of children: Not on file    Years of education: Not on file    Highest education level: Not on file   Occupational History    Not on file   Social Needs    Financial resource strain: Not on file    Food insecurity:     Worry: Not on file     Inability: Not on file    Transportation needs:     Medical: Not on file     Non-medical: Not on file   Tobacco Use    Smoking status: Former Smoker     Last attempt to quit: 1980     Years since quittin.3    Smokeless tobacco: Never Used   Substance and Sexual Activity    Alcohol use: No    Drug use: No    Sexual activity: Never   Lifestyle    Physical activity:     Days per week: Not on file     Minutes per session: Not on file    Stress: Not on file   Relationships    Social connections:     Talks on phone: Not on file     Gets together: Not on file     Attends [employer] service: Not on file     Active member of club or organization: Not on  "file     Attends meetings of clubs or organizations: Not on file     Relationship status: Not on file   Other Topics Concern    Not on file   Social History Narrative    Not on file       Current Outpatient Medications:     amlodipine (NORVASC) 10 MG tablet, Take 10 mg by mouth once daily., Disp: , Rfl:     aspirin (ECOTRIN) 81 MG EC tablet, Take 81 mg by mouth once daily., Disp: , Rfl:     BLOOD SUGAR DIAGNOSTIC (TRUE METRIX GLUCOSE TEST STRIP MISC), by Misc.(Non-Drug; Combo Route) route., Disp: , Rfl:     cilostazol (PLETAL) 50 MG Tab, Take 1 tablet (50 mg total) by mouth 2 (two) times daily. If patient tolerate medication after 4 weeks, increase dose to 100 mg twice daily., Disp: 60 tablet, Rfl: 11    glipiZIDE (GLUCOTROL) 10 MG tablet, Take 10 mg by mouth 2 (two) times daily before meals., Disp: , Rfl:     insulin aspart protamine-insulin aspart (NOVOLOG 70/30) 100 unit/mL (70-30) InPn pen, Inject 45 Units into the skin every morning. , Disp: , Rfl:     insulin aspart protamine-insulin aspart (NOVOLOG MIX 70-30FLEXPEN U-100) 100 unit/mL (70-30) InPn pen, Inject 40 Units into the skin every evening., Disp: , Rfl:     insulin syringe-needle,dispos. 0.3 mL 30 gauge x 5/16" Syrg, by Misc.(Non-Drug; Combo Route) route., Disp: , Rfl:     losartan-hydrochlorothiazide 100-25 mg (HYZAAR) 100-25 mg per tablet, Take 1 tablet by mouth once daily., Disp: , Rfl:     metformin (GLUCOPHAGE) 1000 MG tablet, Take 1,000 mg by mouth 2 (two) times daily with meals., Disp: , Rfl:     doxycycline (VIBRA-TABS) 100 MG tablet, Take 1 tablet (100 mg total) by mouth 2 (two) times daily., Disp: 56 tablet, Rfl: 0    levoFLOXacin (LEVAQUIN) 750 MG tablet, Take 1 tablet (750 mg total) by mouth once daily., Disp: 28 tablet, Rfl: 0    oxyCODONE-acetaminophen (PERCOCET) 5-325 mg per tablet, Take 1 tablet by mouth every 4 (four) hours as needed., Disp: 15 tablet, Rfl: 0  No current facility-administered medications for this visit. "     REVIEW OF SYSTEMS:  General: No fevers or chills; ENT: No sore throat; Allergy and Immunology: no persistent infections; Hematological and Lymphatic: No history of bleeding or easy bruising; Endocrine: negative; Respiratory: no cough, shortness of breath, or wheezing; Cardiovascular: no chest pain or dyspnea on exertion; Gastrointestinal: no abdominal pain/back, change in bowel habits, or bloody stools; Genito-Urinary: no dysuria, trouble voiding, or hematuria; Musculoskeletal: negative; Neurological: no TIA or stroke symptoms; Psychiatric: no nervousness, anxiety or depression.    PHYSICAL EXAM:      Pulse: 80         General appearance:  Alert, well-appearing, and in no distress.  Oriented to person, place, and time                    Neurological: Normal speech, no focal findings noted; CN II - XII grossly intact. RLE with sensation to light touch, LLE with sensation to light touch.            Musculoskeletal: Digits/nail without cyanosis/clubbing.  Strength 5/5 BLE.                    Neck: Supple, no significant adenopathy                  Chest:  Clear to auscultation, no wheezes, rales or rhonchi, symmetric air entry. No use of accessory muscles               Cardiac: Normal rate and regular rhythm, S1 and S2 normal            Abdomen: Soft, nontender, nondistended, no masses or organomegaly, no hernia, +serous drainage from abd inc minimally without infection     No rebound tenderness noted; bowel sounds normal     R groin inc with dehiscence superiorly of skin and to mid aspect healing well with +granulation tissue, no graft exposure, abdominal inc healing well +staples without infection; +superficial induration to distal inc with minimal purulence expressed, no purulence from wound, no surrounding erythema          Extremities:   2+ R femoral pulse, 2+ L femoral pulse     doppler+ R popliteal pulse, doppler+ L popliteal pulse     doppler+ R PT pulse, doppler+ L PT pulse     2+ R DP pulse, doppler+ L DP  pulse     no RLE edema, no LLE edema    Skin: RLE without tissue loss; LLE without tissue loss    LAB RESULTS:  No results found for: CBC  Lab Results   Component Value Date    LABPROT 11.4 05/02/2019    INR 1.1 05/02/2019     Lab Results   Component Value Date     05/04/2019    K 4.0 05/04/2019     05/04/2019    CO2 27 05/04/2019    GLU 99 05/04/2019    BUN 14 05/04/2019    CREATININE 1.1 05/04/2019    CALCIUM 9.5 05/04/2019    ANIONGAP 8 05/04/2019    EGFRNONAA 48.2 (A) 05/04/2019     Lab Results   Component Value Date    WBC 6.48 05/04/2019    RBC 4.18 05/04/2019    HGB 10.2 (L) 05/04/2019    HCT 33.4 (L) 05/04/2019    MCV 80 (L) 05/04/2019    MCH 24.4 (L) 05/04/2019    MCHC 30.5 (L) 05/04/2019    RDW 14.5 05/04/2019     05/04/2019    MPV 10.1 05/04/2019    GRAN 4.3 05/04/2019    GRAN 65.8 05/04/2019    LYMPH 1.5 05/04/2019    LYMPH 22.8 05/04/2019    MONO 0.3 05/04/2019    MONO 4.5 05/04/2019    EOS 0.4 05/04/2019    BASO 0.03 05/04/2019    EOSINOPHIL 6.2 05/04/2019    BASOPHIL 0.5 05/04/2019    DIFFMETHOD Automated 05/04/2019     .  Lab Results   Component Value Date    HGBA1C 9.7 (H) 03/20/2019       IMAGING:  All pertinent imaging has been reviewed and interpreted independently.    11/8/18: US showing occluded fem-fem, decreased velocity R CFA, HD significant stenosis R SFA    [facility] ARCHIE: 0.4/1    1/14/19: ARCHIE R 0.4/1.1, flat L toe waveform, R toe pressure 64 mmHg    2/2019: Stress test without evidence of ischemia.  Echo with normal function.    4/24/19:   ARCHIE   1. Right lower extremity pressures and waveforms indicate mild arterial occlusive disease.  2. Left lower extremity pressures and waveforms indicate no hemodynamically significant arterial occlusive disease.    BLE arterial US  1. Right lower extremity arterial ultrasound and pressures indicate no hemodynamically significant stenosis.  The right iliac artery to femoral artery bypass is patent without evidence of hemodynamically  significant stenosis.    2. Left lower extremity arterial ultrasound shows an occluded left external iliac artery.  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    CTA runoff 4/24/2019:  Patient is postop right iliac to femoral artery bypass.  There is some soft tissue stranding along the iliac graft similar to slightly increased compared to prior.  May be some narrowing at the origin of the graft.  Dilatation of the insertion may be related to surgical technique or pseudoaneurysm.  Correlation with operative procedure will be helpful.  There is decrease in the volume of fluid though a focal 1 cm collection does persist.  There is some increased soft tissue thickening about the insertion site as well as the skin.  Skin staples remain.    Occluded fem-fem bypass graft.    Significant vascular disease throughout the SFA and calf vessels bilaterally similar.    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    Cholelithiasis.    IMP/PLAN:  78 y.o. female with   Patient Active Problem List   Diagnosis    Lichen sclerosus et atrophicus    PVD (peripheral vascular disease)    Essential hypertension    Type 2 diabetes mellitus    Diastolic CHF, chronic    Obesity (BMI 30-39.9)    History of DVT (deep vein thrombosis)    Wound dehiscence    Fluid collection at surgical site    Cellulitis    Postoperative seroma of musculoskeletal structure after non-musculoskeletal procedure    Abscess of right groin    being managed by PCP and specialists who is here today for evaluation of RLE claudication, lifestyle limiting s/p R iliofemoral bypass 3/2019 with groin incision dehisence and superficial infection / deep seroma    -Recovering well s/p R iliofemoral bypass s/p wound washout and drainage of seroma with healing skin dehiscence of R groin inc at superior and mid aspect, no graft exposure or fascial dehiscence, 2+ DP with resolution of claudication and ischemic rest pain, now with  healing wounds although persistent induration to distal aspect of incision with superficial skin infection with min purulence and improvement of fluid collection on repeat CTA without abscess noted - superficial skin fluid drained today without further/deeper purulence identified; rec admission for IV Abx, ID consult and groin washout  -Cont ASA, statin and Pletal  -Heart healthy lifestyle  -Cont smoking cessation  -NPO after midnight    I spent 20 minutes evaluating this patient and greater than 50% of the time was spent counseling, coordinator care and discussing the plan of care.  All questions were answered and patient stated understanding with agreement with the above treatment plan.    Danny Dunbar MD VI  Vascular and Endovascular Surgery

## 2019-05-06 ENCOUNTER — TELEPHONE (OUTPATIENT)
Dept: VASCULAR SURGERY | Facility: CLINIC | Age: 79
End: 2019-05-06

## 2019-05-06 LAB
BACTERIA SPEC AEROBE CULT: NO GROWTH
BACTERIA SPEC AEROBE CULT: NO GROWTH
POCT GLUCOSE: 195 MG/DL (ref 70–110)

## 2019-05-06 NOTE — OP NOTE
DATE OF PROCEDURE:  05/03/2019    SERVICE:  Vascular Surgery.    SURGEON:  Danny Dunbar M.D.    ASSISTANT:  Cayetano Jones.    PREOPERATIVE DIAGNOSES:  1.  Right groin superficial skin infection.  2.  Right lower extremity atherosclerosis with history of right iliofemoral   bypass, March 2019.  3.  Hypertension.  4.  Hyperlipidemia.  5.  Obesity.    POSTOPERATIVE DIAGNOSES:  1.  Right groin superficial skin infection.  2.  Right lower extremity atherosclerosis with history of right iliofemoral   bypass, March 2019.  3.  Hypertension.  4.  Hyperlipidemia.  5.  Obesity.    PROCEDURES PERFORMED:  1.  Right groin wound washout with Pulsavac, wound measurement 9 cm x 3 cm x   0.25 cm.  2.  Right groin wound debridement.  3.  Removal of retained foreign body, right groin wound.    INDICATIONS FOR PROCEDURE:  This is a 78-year-old black female with the above   listed medical history who underwent a right iliofemoral bypass in March 2019   due to ischemic rest pain.  She has minimal skin dehiscence to her right groin,   redo incision at the postoperative check for which a CT was performed, which   showed a fluid collection and there was concern for infection for which her   right groin wound was explored and she underwent a washout.  A few weeks ago   noting a seroma, which was drained, intraoperative cultures were no growth and   she continued local wound care and p.o. antibiotics per Infectious Disease   recommendations.  She was seen in followup appointment outpatient with   improvement in her wound, although the distal aspect of her incision was noted   to be indurated with minimal purulence upon pressure.  This was drained manually   in the clinic and she was represcribed her p.o. antibiotics and she was   instructed to keep it clean and dry, which she had not been doing prior to this   visit.  She continued local wound care and represented to clinic a few days   later with persistent purulence superficially for which  she was admitted to the   hospital and underwent a repeat CT scan.  This showed improvement in the fluid   collection, although there appeared to be a superficial soft tissue fluid   collection with a retained staple.  She was admitted to the hospital and started   on IV antibiotics and Infectious Disease was consulted.  It was determined that   she would benefit from a right groin exploration and washout and also   debridement.  After risks and benefits were discussed with the patient, the   patient stated understanding and desired to proceed with surgical intervention.    DESCRIPTION OF THE PROCEDURE:  The patient was seen preoperatively by Anesthesia   and was deemed stable for surgery.  She was brought to the Operating Room and   placed supine on the procedure table.  Her vital signs were stable and she was   intubated, general anesthesia without complications.  Please review Anesthesia   record for the details of the induction.  Next, she was prepped and draped in   sterile fashion and a timeout was performed.  A scalpel was used to extend the   incision inferiorly after the proximal wounds were inspected without dehiscence   or infection noted.  The indurated skin was incised and there was no purulence   noted.  The skin had healed well below other than a small 2 mm defect in the   subdermis tissue.  This area was examined thoroughly and there was no deep   infection or purulence noted.  Cultures were obtained and the wound was washed   out copiously with Pulsavac, bacitracin saline.  Sterile dressing was applied in   the Operating Room wet-to-dry and she was awoken from general anesthesia and   transferred to the stretcher and subsequently to the Recovery Room in stable   condition.    COMPLICATIONS:  None.    ESTIMATED BLOOD LOSS:  Less than 5 mL.    ANESTHESIA:  General.      ELLA/JONNY  dd: 05/03/2019 17:59:22 ([facility])  td: 05/03/2019 22:30:09 ([facility])  Doc ID   #[id number]  Job ID #[id number]    CC:

## 2019-05-07 ENCOUNTER — PATIENT OUTREACH (OUTPATIENT)
Dept: ADMINISTRATIVE | Facility: CLINIC | Age: 79
End: 2019-05-07

## 2019-05-07 ENCOUNTER — OFFICE VISIT (OUTPATIENT)
Dept: INFECTIOUS DISEASES | Facility: CLINIC | Age: 79
End: 2019-05-07
Payer: MEDICARE

## 2019-05-07 VITALS
BODY MASS INDEX: 32.03 KG/M2 | TEMPERATURE: 98 F | SYSTOLIC BLOOD PRESSURE: 128 MMHG | HEIGHT: 60 IN | HEART RATE: 76 BPM | DIASTOLIC BLOOD PRESSURE: 60 MMHG

## 2019-05-07 DIAGNOSIS — T81.30XA WOUND DEHISCENCE: ICD-10-CM

## 2019-05-07 DIAGNOSIS — L02.214 ABSCESS OF RIGHT GROIN: ICD-10-CM

## 2019-05-07 DIAGNOSIS — R11.0 NAUSEA: Primary | ICD-10-CM

## 2019-05-07 LAB
BACTERIA BLD CULT: NORMAL
BACTERIA BLD CULT: NORMAL

## 2019-05-07 PROCEDURE — 3078F PR MOST RECENT DIASTOLIC BLOOD PRESSURE < 80 MM HG: ICD-10-PCS | Mod: CPTII,S$GLB,, | Performed by: PHYSICIAN ASSISTANT

## 2019-05-07 PROCEDURE — 99214 PR OFFICE/OUTPT VISIT, EST, LEVL IV, 30-39 MIN: ICD-10-PCS | Mod: S$GLB,,, | Performed by: PHYSICIAN ASSISTANT

## 2019-05-07 PROCEDURE — 1101F PR PT FALLS ASSESS DOC 0-1 FALLS W/OUT INJ PAST YR: ICD-10-PCS | Mod: CPTII,S$GLB,, | Performed by: PHYSICIAN ASSISTANT

## 2019-05-07 PROCEDURE — 99999 PR PBB SHADOW E&M-EST. PATIENT-LVL IV: CPT | Mod: PBBFAC,,, | Performed by: PHYSICIAN ASSISTANT

## 2019-05-07 PROCEDURE — 1101F PT FALLS ASSESS-DOCD LE1/YR: CPT | Mod: CPTII,S$GLB,, | Performed by: PHYSICIAN ASSISTANT

## 2019-05-07 PROCEDURE — 99214 OFFICE O/P EST MOD 30 MIN: CPT | Mod: S$GLB,,, | Performed by: PHYSICIAN ASSISTANT

## 2019-05-07 PROCEDURE — 3074F PR MOST RECENT SYSTOLIC BLOOD PRESSURE < 130 MM HG: ICD-10-PCS | Mod: CPTII,S$GLB,, | Performed by: PHYSICIAN ASSISTANT

## 2019-05-07 PROCEDURE — 3078F DIAST BP <80 MM HG: CPT | Mod: CPTII,S$GLB,, | Performed by: PHYSICIAN ASSISTANT

## 2019-05-07 PROCEDURE — 99999 PR PBB SHADOW E&M-EST. PATIENT-LVL IV: ICD-10-PCS | Mod: PBBFAC,,, | Performed by: PHYSICIAN ASSISTANT

## 2019-05-07 PROCEDURE — 3074F SYST BP LT 130 MM HG: CPT | Mod: CPTII,S$GLB,, | Performed by: PHYSICIAN ASSISTANT

## 2019-05-07 NOTE — PROGRESS NOTES
Subjective:      Patient ID: Nelsy Marino is a 78 y.o. female.    Chief Complaint:f/u with kimmy      History of Present Illness    {ID HPI BLOCKS:94230}    Review of Systems   Constitution: Negative for chills, decreased appetite, fever, malaise/fatigue, night sweats, weight gain and weight loss.   HENT: Negative for congestion, ear pain, hearing loss, hoarse voice, sore throat and tinnitus.    Eyes: Negative for blurred vision, redness and visual disturbance.   Cardiovascular: Negative for chest pain, leg swelling and palpitations.   Respiratory: Positive for cough. Negative for hemoptysis, shortness of breath and sputum production.    Hematologic/Lymphatic: Negative for adenopathy. Does not bruise/bleed easily.   Skin: Positive for itching. Negative for dry skin, rash and suspicious lesions.   Musculoskeletal: Negative for back pain, joint pain, myalgias and neck pain.   Gastrointestinal: Negative for abdominal pain, constipation, diarrhea, heartburn, nausea and vomiting.   Genitourinary: Negative for dysuria, flank pain, frequency, hematuria, hesitancy and urgency.   Neurological: Positive for dizziness and numbness. Negative for headaches, paresthesias and weakness.   Psychiatric/Behavioral: Negative for depression and memory loss. The patient does not have insomnia and is not nervous/anxious.      Objective:   Physical Exam  Assessment:       No diagnosis found.      Plan:       ***

## 2019-05-07 NOTE — PATIENT INSTRUCTIONS
Wound Check After Surgery: Infection   Your surgical wound has become infected. Infection after surgery usually involves just the top layers of skin. Sometimes the infection is deeper in the wound and may involve a collection of fluid or pus. Treatment will depend on the type of infection you have.   Once antibiotic treatment is started the area of redness should not increase. Pain should start to decrease after two days of treatment.   Home Care:   Keep the wound clean and dry. Change the dressing as directed, or sooner, if the dressing becomes wet or stained with blood or fluid.   If you were told to clean the wound:   Remove the old bandage and wash the wound with soap and water.   Clean with hydrogen peroxide on a cotton tip applicator (Q-tip) to remove any crust or drainage.   Apply an antibiotic cream or ointment such as Neosporin or Bacitracin.   Reapply the bandage.  Bathe with a sponge (no shower or tub baths) for the first few days after surgery, or until there is no more drainage from you wound. Then, you may shower, but do not soak the area in water (no baths or swimming) until the sutures, staples or steri-strips are removed and any wound opening has healed and become dry.   If antibiotics have been prescribed, take them exactly as directed until they are all gone.   You may use acetaminophen (Tylenol) or ibuprofen (Motrin, Advil) to control pain, unless another medicine was prescribed. [NOTE: If you have chronic liver or kidney disease or ever had a stomach ulcer or GI bleeding, talk with your doctor before using these medicines.]  Follow Up   with your doctor as scheduled or as advised by our staff for your next wound check or suture/staple/tape removal.   Return Promptly   or contact your doctor if any of the following occurs:   Increasing pain at the site of surgery or pain not controlled by medicine prescribed   Fever of 100.4°F (38ºC) or higher, or as directed by your healthcare provider    Increasing redness around the wound   Fluid, pus or blood that continues to drain from the wound after five days of treatment   Vomiting, constipation or diarrhea   Shortness of breath or chest pain  © 2590-2769 [employer]. [address] [place] [address], [place] [address]. All rights reserved. This information is not intended as a substitute for professional medical care. Always follow your healthcare professional's instructions.

## 2019-05-07 NOTE — PROGRESS NOTES
Subjective:      Patient ID: Nelsy Marino is a 78 y.o. female.    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 She underwent washout on 4/12. Per operative note, a small area at the base of the inferior wound was opened, and a moderate amount of serous fluid was removed. There was no purulent fluid or material in the wound. Wound cultures from swab prior to surgery grew Proteus. Surgical cultures returned with genital damir.  Patient was discharged on discharged on levofloxacin 500mg po q 48hr for skin/soft tissue infection x 14 days. She followed up in clinic and there appeared to be an abscess at the inferior aspect of the wound with a small amount of purulent drainage. CT showed persistent soft tissues stranding along the course of the iliac graft similar to prior with small fluid collection in the right groin. She was admitted and underwent right groin washout on 5/3. Again, no purulence noted. Surgical cx are NGTD. She was discharged on Doxycline and Levofloxacin with a plan to complete 4 weeks of therapy.      She is seen today for follow up. Overall feels well. Denies fevers, chills, sweats, purulent drainage from wound or current groin pain. Having slight nausea with antibiotics. Otherwise, tolerating well.    Review of Systems   Constitution: Negative for chills, decreased appetite, fever, malaise/fatigue, night sweats, weight gain and weight loss.   HENT: Negative for congestion, ear pain, hearing loss, hoarse voice, sore throat and tinnitus.    Eyes: Negative for blurred vision, redness and visual disturbance.   Cardiovascular: Negative for chest pain, leg swelling and palpitations.   Respiratory: Positive for cough. Negative for hemoptysis, shortness of breath,  sputum production and wheezing.    Hematologic/Lymphatic: Negative for adenopathy. Does not bruise/bleed easily.   Skin: Positive for itching. Negative for dry skin, rash and suspicious lesions.   Musculoskeletal: Negative for back pain, joint pain, myalgias and neck pain.   Gastrointestinal: Negative for abdominal pain, constipation, diarrhea, heartburn, nausea and vomiting.   Genitourinary: Negative for dysuria, flank pain, frequency, hematuria, hesitancy and urgency.   Neurological: Positive for dizziness and numbness. Negative for headaches, paresthesias and weakness.   Psychiatric/Behavioral: Negative for depression and memory loss. The patient does not have insomnia and is not nervous/anxious.    Allergic/Immunologic: Negative for environmental allergies, HIV exposure, hives and persistent infections.     Objective:     Vitals:    05/07/19 1450   BP: 128/60   Pulse: 76   Temp: 97.7 °F (36.5 °C)   Height: 5' (1.524 m)   PainSc:   6     Physical Exam   Constitutional: She is oriented to person, place, and time. She appears well-developed and well-nourished. No distress.   HENT:   Head: Normocephalic and atraumatic.   Mouth/Throat: Uvula is midline, oropharynx is clear and moist and mucous membranes are normal. No oral lesions.   Eyes: Conjunctivae are normal. No scleral icterus.   Cardiovascular: Normal rate, regular rhythm and normal heart sounds.   No murmur heard.  Pulmonary/Chest: Effort normal and breath sounds normal. No respiratory distress. She has no wheezes. She has no rales.   Abdominal: Soft. She exhibits no distension. There is no hepatosplenomegaly. There is no tenderness.   Musculoskeletal: She exhibits no edema.   Neurological: She is alert and oriented to person, place, and time.   Skin: Skin is warm, dry and intact. No rash noted.   Right groin wound with granular base. No purulence, surrounding erythema, warmth, induration or signs of infection   Psychiatric: She has a normal mood and affect.  Her behavior is normal.     Assessment:       1. Nausea    2. Abscess of right groin    3. Wound dehiscence          Plan:   1. Continue  levaquin 750 mg q 24 hours and doxycycline 100 mg PO BID x 4 weeks as planned (end date June 1st)  2. F/u with vascular surgery as scheduled  3. Continue local wound care. Advised patient not to soak wound.  4. RTC in 3 weeks for EOC.

## 2019-05-09 ENCOUNTER — OFFICE VISIT (OUTPATIENT)
Dept: VASCULAR SURGERY | Facility: CLINIC | Age: 79
End: 2019-05-09
Payer: MEDICARE

## 2019-05-09 VITALS
HEART RATE: 86 BPM | SYSTOLIC BLOOD PRESSURE: 130 MMHG | WEIGHT: 164.38 LBS | BODY MASS INDEX: 32.27 KG/M2 | DIASTOLIC BLOOD PRESSURE: 50 MMHG | HEIGHT: 60 IN

## 2019-05-09 DIAGNOSIS — T81.31XS POSTOPERATIVE WOUND DEHISCENCE, SEQUELA: Primary | ICD-10-CM

## 2019-05-09 PROCEDURE — 99999 PR PBB SHADOW E&M-EST. PATIENT-LVL III: ICD-10-PCS | Mod: PBBFAC,,, | Performed by: SURGERY

## 2019-05-09 PROCEDURE — 99024 POSTOP FOLLOW-UP VISIT: CPT | Mod: S$GLB,,, | Performed by: SURGERY

## 2019-05-09 PROCEDURE — 99999 PR PBB SHADOW E&M-EST. PATIENT-LVL III: CPT | Mod: PBBFAC,,, | Performed by: SURGERY

## 2019-05-09 PROCEDURE — 99024 PR POST-OP FOLLOW-UP VISIT: ICD-10-PCS | Mod: S$GLB,,, | Performed by: SURGERY

## 2019-05-09 NOTE — PATIENT INSTRUCTIONS
Understanding Peripheral Arterial Disease    Peripheral arteries deliver oxygen-rich blood to the tissues outside the heart. As you age, your arteries become stiffer and thicker. In addition, risk factors, such as smoking and high cholesterol, can damage the artery lining. This allows a buildup of fat and other materials (plaque) to form within the artery walls. The buildup of plaque narrows the space inside the artery and sometimes blocks blood flow. Peripheral arterial disease (PAD) happens when blood flow through the arteries is reduced because of plaque buildup. It often happens in the legs and feet, but can also happen elsewhere in the body. If this buildup happens in the a large artery in the neck (carotid artery), it can be a major contributor to stroke.  A healthy artery  An artery is a muscular tube that carries oxgen rich blood and nutrients from the heart to the rest of the body. It has a smooth lining and flexible walls that allow blood to pass freely. When active, muscles need more oxygen. This increases blood flow. Healthy arteries can adapt to meet this need.  A damaged artery    PAD begins when the lining of an artery is damaged. This is often because of risk factors, such as smoking, older age, or diabetes. Plaque then starts to form within the artery wall. At this stage, blood flows normally, so youre not likely to have symptoms.  A narrowed artery    If plaque continues to build up, the space inside the artery narrows. The artery walls become less able to expand. The artery still provides enough blood and oxygen to your muscles during rest. But when youre active, the increased demand for blood cant be met. As a result, your leg may cramp or ache when you walk.  A blocked artery    An artery can become blocked by plaque or by a blood clot lodged in a narrowed section. When this happens, oxygen cant reach the muscle below the blockage. Then you may feel pain when lying down or when you are not  active (rest pain). This type of pain is especially common at night when youre lying flat. In time, the affected tissue can die. This can lead to the loss of a toe or foot.  Date Last Reviewed: 5/1/2016 © 2000-2017 [employer]. [address] [place] [address]. All rights reserved. This information is not intended as a substitute for professional medical care. Always follow your healthcare professional's instructions.        Peripheral Artery Disease (PAD)     Peripheral artery disease (PAD) occurs when the arteries that carry blood to the limbs are narrowed or blocked. This is usually due to a buildup of a fatty substance called plaque in the walls of the arteries.  PAD most often affects the arteries in the legs. When these arteries are narrowed or blocked, blood flow to the legs is reduced. This can cause leg and foot pain and other symptoms. If severe enough, reduced blood flow to the legs can lead to tissue death (gangrene) and the loss of a toe, foot, or leg. Having PAD also makes it more likely that arteries in other body areas are blocked. For instance, arteries that carry blood to the heart or brain may be affected. This raises the chances of heart attack and stroke.  Risk factors  Certain factors can make PAD more likely. They include:  · Smoking  · Diabetes  · High blood pressure  · Unhealthy cholesterol levels  · Obesity  · Inactive lifestyle  · Older age  · Family history of PAD  Symptoms  Many people with PAD have no symptoms. If symptoms do occur, they can include:  · Pain in the muscles of the calves, thighs or hips that gets worse with activity and better with rest (intermittent claudication)  · Achy, tired, or heavy feeling in the legs  · Weakness, numbness, tingling, or loss of feeling in the legs  · Changes in skin color of the legs  · Sores on the legs and feet  · Cold leg, feet, or toes  · Pain the feet or toes even when lying down (rest pain)  Home care  PAD is a  chronic (lifelong) condition. Treatment is focused on managing your condition and lowering your health risks. This may include doing the following:  · If you smoke, quit. This helps prevent further damage to your arteries and lowers your health risks. Ask your provider about medicines or products that can help you quit smoking. Also consider joining a stop-smoking program or support group.  · Be more active. This helps you lose weight and manage problems such as high blood pressure and unhealthy cholesterol levels. Start a walking program if advised to by your provider. Your provider may also help you form a safe exercise program that is right for your needs.  · Make healthy eating changes. This includes eating less fat, salt, and sugar.  · Take medicines for high blood pressure, unhealthy cholesterol levels, and diabetes as directed.  · Have your blood pressure and cholesterol levels checked as often as directed.  · If you have diabetes, try to keep your blood sugar well controlled. Test your blood sugar as directed.  · If you are overweight, talk to your provider about forming a weight-loss plan.  · Watch for cuts, scrapes, or open sores on your feet. Poor blood flow to the feet may slow healing and increase the risk of infection from these problems.   Follow-up care  Follow up with your healthcare provider, or as advised. If imaging tests such as ultrasound were done, they will be reviewed by a doctor. You will be told the results and any new findings that may affect your care.  When to seek medical advice   Call your healthcare provider right away if any of these occur:  · Sudden severe pain in the legs or feet  · Sudden cold, pale or blue color in the legs or feet  · Weakness or numbness in the legs or feet that worsens  · Any sore or wound in the legs or feet that wont heal  · Weak pulse in your legs or feet  Know the Signs of Heart Attack and Stroke  People with PAD are at high risk for heart attack and  stroke. Knowing the signs of these problems can help you protect your health and get help when you need it. Call 911 right away if you have any of the following:  Signs of a Heart Attack  · Chest discomfort, such as pain, aching, tightness, or pressure that lasts more than a few minutes, or that comes and goes  · Pain or discomfort in the arms, back, shoulders, neck, or jaw  · Shortness of breath  · Sweating (often a cold, clammy sweat)  · Nausea  · Lightheadedness  Signs of a Stroke  · Sudden numbness or weakness of the face, arms, or legs, especially on one side  · Sudden confusion or trouble speaking or understanding  · Sudden trouble seeing in one or both eyes  · Sudden trouble walking, dizziness, or loss of balance  · Sudden, severe headache with no known cause   Date Last Reviewed: 9/21/2015  © 1424-1281 [employer]. [address] [place] [address]. All rights reserved. This information is not intended as a substitute for professional medical care. Always follow your healthcare professional's instructions.        A Walking Program for Peripheral Arterial Disease (PAD)  Peripheral arterial disease (PAD) is a condition where the arteries in the legs are severely damaged. When you have PAD, walking can be painful. So you may start to walk less. Walking less makes your leg muscles weaker. It then becomes harder and more painful to walk. A walking program can break this cycle. This sheet gives you tips on how to get started.     Walking farther without pain  Exercise strengthens leg muscles that are out of shape. It also trains these muscles to work with less oxygen. This helps your leg muscles work better even with reduced blood flow to your legs. When you have PAD, a walking program can be helpful. Your program can:  · Let you walk longer and farther without claudication. This is an ache in your legs during exercise that goes away with rest.  · Let you do more and be more active  · Add to  your overall health and well-being  · Help you control your blood sugar and blood pressure  · Help you become healthier with no risk and at little or no cost  Getting started  Your local hospital, vascular center, or cardiac rehab center may have a special walking program for people with PAD. If so, this is your best option. But if you cant find a program, or its not covered by insurance, you can still walk on your own. Follow these steps at each session:  · Step 1. Start at a pace that lets you walk for 5 to 10 minutes before you start to feel claudication. This feeling is unpleasant, but it doesnt hurt you. Keep going until the pain makes you feel that you need to stop.  · Step 2. Stop and rest for 3 to 5 minutes, just long enough for the pain to go away. You can rest standing or sitting. (Some people like to bring along a cane or a lightweight folding chair.)  · Step 3. Again walk at a pace that lets you walk for 5 to 10 minutes more before you feel pain. This may be slower than your starting pace in step 1. Then rest again.  · Step 4. Repeat this process until youve walked for 45 minutes. This should be about 60 to 80 minutes total, including resting time. You may not be able to do a full 45 minutes at first. Do as much as you can, and increase your time as you improve.  Making the most of your program  · Walk at least 3 times a week. Take no more than 2 days off between sessions. If you stop walking, even for a week or two, you can lose the health benefits of your program.  · Find a good place to walk. A treadmill or a track may be better at first. That way, you wont run the risk of going too far and finding that you cant walk back. Be sure to have a place to walk indoors in bad weather, such as a gym or a mall.  · Wear shoes with sturdy, flexible soles. The heel should fit without slipping. You should have room to wiggle your toes.  · Keep track of how long you walk. A pedometer will show your daily  progress. It can also show how much farther you can walk as time goes by.  · Ask a friend to keep you company. You may enjoy walking with someone else. Or you may want to make your walking sessions a time to relax by yourself.  · Make it fun. Listen to music while you walk and rest. Walk in a favorite park. Get to know the people at the gym, or the folks that you pass on your route. While you rest, you can window-shop, read, or feed the birds. Do whatever will help you enjoy your exercise sessions.  Date Last Reviewed: 6/1/2016  © 9317-0759 [employer]. [address] [place] [address], [place] [address]. All rights reserved. This information is not intended as a substitute for professional medical care. Always follow your healthcare professional's instructions.

## 2019-05-09 NOTE — LETTER
May 9, 2019        [place] [address] [place] [facility] [address]             [facility] [place] [facility]  [address]  [facility] [address]  Phone: [phone]  Fax: [phone]   Patient: Nelsy Marino   MR Number: [id number]   YOB: 1940   Date of Visit: 5/9/2019       Dear Dr. Garay:    Thank you for referring Nelsy Marino to me for evaluation. Below are the relevant portions of my assessment and plan of care.            If you have questions, please do not hesitate to call me. I look forward to following Nelsy along with you.    Sincerely,      Danny Dunbar MD           CC  No Recipients

## 2019-05-09 NOTE — PROGRESS NOTES
Danny Dunbar MD [facility]                         05/09/2019    HPI:  Nelsy Marino is a 78 y.o. female with   Patient Active Problem List   Diagnosis    Lichen sclerosus et atrophicus    PVD (peripheral vascular disease)    Essential hypertension    Type 2 diabetes mellitus    Diastolic CHF, chronic    Obesity (BMI 30-39.9)    History of DVT (deep vein thrombosis)    Wound dehiscence    Fluid collection at surgical site    Cellulitis    Postoperative seroma of musculoskeletal structure after non-musculoskeletal procedure    Abscess of right groin    being managed by PCP and specialists who is here today for evaluation of RLE pain.  She is s/p fem-fem approx 20 yrs ago without issues until recently.  Fem-fem that was performed for RLE ALI was noted to be occluded on US at [facility].  Was scheduled for an angiogram at [facility] although was unable to proceed due to insurance purposes.  Patient states location is R calf and thigh occurring for 2 mo.  Associated signs and symptoms include none.  Quality is throbbing and severity is 9/10 at worst.  Symptoms began 2 mo ago.  Alleviating factors include rest.  Worsening factors include ambulation.  Pt states RLE claudication x 1 block.  States it is lifestyle limiting.     no MI  no Stroke  Tobacco use: quit 35 yrs ago    1/14/19: cont to c/o RLE pain, 1/2 block calf claudication.  Intermittent rest pain, no tissue loss.  Compliant with Pletal, has not noticed significant improvement.    2/14/19: Cont to experience 1/2 block R calf claudication.  No wounds.      4/8/19: s/p R iliofemoral bypass 3/20/19.  C/o R groin inc dehiscence by LUCIUS RN.  No F/C.  States RLE ischemic rest pain resolved, +ambulating better without issues.    4/11/19: Presents for further eval of R abundio inc dehiscence.  No F/C.    4/29/19: No new issues, states took all of PO Abx earlier than rec duration of 2 weeks by ID, no F/C.   States wound healing although has not used soap.  No RLE claudication or rest pain, no wounds to R foot.  States RLE pain resolved.    Interval history:  On PO Abx.  Denies F/C.  Keeping clean with soap and water now.    Past Medical History:   Diagnosis Date    CHF (congestive heart failure)     Diabetes mellitus     DVT (deep venous thrombosis)     Hypertension     Miscarriage      Past Surgical History:   Procedure Laterality Date     femoral vascular surgery       CREATION, BYPASS, ARTERIAL, ILIAC TO FEMORAL, RIGHT LOWER EXTREMITY, RIGHT PROFUNDAPLASTY Right 3/20/2019    [facility]    HYSTERECTOMY      ?unsure cervix present    Incision and Drainage - R groin washout, possible muscle flap Right 2019    Performed [facility] at [facility] OR [facility]    Incision and Drainage R groin Right 5/3/2019    Performed by Danny Dunbar MD at [facility] OR 2ND FLR     Family History   Problem Relation Age of Onset    Diabetes Father     Hypertension Father      Social History     Socioeconomic History    Marital status:      Spouse name: Not on file    Number of children: Not on file    Years of education: Not on file    Highest education level: Not on file   Occupational History    Not on file   Social Needs    Financial resource strain: Not on file    Food insecurity:     Worry: Not on file     Inability: Not on file    Transportation needs:     Medical: Not on file     Non-medical: Not on file   Tobacco Use    Smoking status: Former Smoker     Last attempt to quit: 1980     Years since quittin.3    Smokeless tobacco: Never Used   Substance and Sexual Activity    Alcohol use: No    Drug use: No    Sexual activity: Never   Lifestyle    Physical activity:     Days per week: Not on file     Minutes per session: Not on file    Stress: Not on file   Relationships    Social connections:     Talks on phone: Not on file     Gets together:  "Not on file     Attends [employer] service: Not on file     Active member of club or organization: Not on file     Attends meetings of clubs or organizations: Not on file     Relationship status: Not on file   Other Topics Concern    Not on file   Social History Narrative    Not on file       Current Outpatient Medications:     amlodipine (NORVASC) 10 MG tablet, Take 10 mg by mouth once daily., Disp: , Rfl:     aspirin (ECOTRIN) 81 MG EC tablet, Take 81 mg by mouth once daily., Disp: , Rfl:     BLOOD SUGAR DIAGNOSTIC (TRUE METRIX GLUCOSE TEST STRIP MISC), by Misc.(Non-Drug; Combo Route) route., Disp: , Rfl:     cilostazol (PLETAL) 50 MG Tab, Take 1 tablet (50 mg total) by mouth 2 (two) times daily. If patient tolerate medication after 4 weeks, increase dose to 100 mg twice daily., Disp: 60 tablet, Rfl: 11    doxycycline (VIBRA-TABS) 100 MG tablet, Take 1 tablet (100 mg total) by mouth 2 (two) times daily., Disp: 56 tablet, Rfl: 0    glipiZIDE (GLUCOTROL) 10 MG tablet, Take 10 mg by mouth 2 (two) times daily before meals., Disp: , Rfl:     insulin aspart protamine-insulin aspart (NOVOLOG 70/30) 100 unit/mL (70-30) InPn pen, Inject 45 Units into the skin every morning. , Disp: , Rfl:     insulin aspart protamine-insulin aspart (NOVOLOG MIX 70-30FLEXPEN U-100) 100 unit/mL (70-30) InPn pen, Inject 40 Units into the skin every evening., Disp: , Rfl:     insulin syringe-needle,dispos. 0.3 mL 30 gauge x 5/16" Syrg, by Misc.(Non-Drug; Combo Route) route., Disp: , Rfl:     levoFLOXacin (LEVAQUIN) 750 MG tablet, Take 1 tablet (750 mg total) by mouth once daily., Disp: 28 tablet, Rfl: 0    losartan-hydrochlorothiazide 100-25 mg (HYZAAR) 100-25 mg per tablet, Take 1 tablet by mouth once daily., Disp: , Rfl:     metformin (GLUCOPHAGE) 1000 MG tablet, Take 1,000 mg by mouth 2 (two) times daily with meals., Disp: , Rfl:     oxyCODONE-acetaminophen (PERCOCET) 5-325 mg per tablet, Take 1 tablet by mouth every 4 (four) " hours as needed., Disp: 15 tablet, Rfl: 0    REVIEW OF SYSTEMS:  General: No fevers or chills; ENT: No sore throat; Allergy and Immunology: no persistent infections; Hematological and Lymphatic: No history of bleeding or easy bruising; Endocrine: negative; Respiratory: no cough, shortness of breath, or wheezing; Cardiovascular: no chest pain or dyspnea on exertion; Gastrointestinal: no abdominal pain/back, change in bowel habits, or bloody stools; Genito-Urinary: no dysuria, trouble voiding, or hematuria; Musculoskeletal: negative; Neurological: no TIA or stroke symptoms; Psychiatric: no nervousness, anxiety or depression.    PHYSICAL EXAM:      Pulse: 86         General appearance:  Alert, well-appearing, and in no distress.  Oriented to person, place, and time                    Neurological: Normal speech, no focal findings noted; CN II - XII grossly intact. RLE with sensation to light touch, LLE with sensation to light touch.            Musculoskeletal: Digits/nail without cyanosis/clubbing.  Strength 5/5 BLE.                    Neck: Supple, no significant adenopathy                  Chest:  Clear to auscultation, no wheezes, rales or rhonchi, symmetric air entry. No use of accessory muscles               Cardiac: Normal rate and regular rhythm, S1 and S2 normal            Abdomen: Soft, nontender, nondistended, no masses or organomegaly, no hernia, +serous drainage from abd inc minimally without infection     No rebound tenderness noted; bowel sounds normal     R groin inc with dehiscence superiorly of skin and to mid aspect healing well with +granulation tissue, no graft exposure, abdominal inc healing well without infection; groin wound healing well, no purulence from wound, no surrounding erythema          Extremities:   2+ R femoral pulse, 2+ L femoral pulse     doppler+ R popliteal pulse, doppler+ L popliteal pulse     doppler+ R PT pulse, doppler+ L PT pulse     2+ R DP pulse, doppler+ L DP pulse     no  RLE edema, no LLE edema    Skin: RLE without tissue loss; LLE without tissue loss    LAB RESULTS:  No results found for: CBC  Lab Results   Component Value Date    LABPROT 11.4 05/02/2019    INR 1.1 05/02/2019     Lab Results   Component Value Date     05/04/2019    K 4.0 05/04/2019     05/04/2019    CO2 27 05/04/2019    GLU 99 05/04/2019    BUN 14 05/04/2019    CREATININE 1.1 05/04/2019    CALCIUM 9.5 05/04/2019    ANIONGAP 8 05/04/2019    EGFRNONAA 48.2 (A) 05/04/2019     Lab Results   Component Value Date    WBC 6.48 05/04/2019    RBC 4.18 05/04/2019    HGB 10.2 (L) 05/04/2019    HCT 33.4 (L) 05/04/2019    MCV 80 (L) 05/04/2019    MCH 24.4 (L) 05/04/2019    MCHC 30.5 (L) 05/04/2019    RDW 14.5 05/04/2019     05/04/2019    MPV 10.1 05/04/2019    GRAN 4.3 05/04/2019    GRAN 65.8 05/04/2019    LYMPH 1.5 05/04/2019    LYMPH 22.8 05/04/2019    MONO 0.3 05/04/2019    MONO 4.5 05/04/2019    EOS 0.4 05/04/2019    BASO 0.03 05/04/2019    EOSINOPHIL 6.2 05/04/2019    BASOPHIL 0.5 05/04/2019    DIFFMETHOD Automated 05/04/2019     .  Lab Results   Component Value Date    HGBA1C 9.7 (H) 03/20/2019       IMAGING:  All pertinent imaging has been reviewed and interpreted independently.    11/8/18: US showing occluded fem-fem, decreased velocity R CFA, HD significant stenosis R SFA    [facility] ARCHIE: 0.4/1    1/14/19: ARCHIE R 0.4/1.1, flat L toe waveform, R toe pressure 64 mmHg    2/2019: Stress test without evidence of ischemia.  Echo with normal function.    4/24/19:   ARCHIE   1. Right lower extremity pressures and waveforms indicate mild arterial occlusive disease.  2. Left lower extremity pressures and waveforms indicate no hemodynamically significant arterial occlusive disease.    BLE arterial US  1. Right lower extremity arterial ultrasound and pressures indicate no hemodynamically significant stenosis.  The right iliac artery to femoral artery bypass is patent without evidence of hemodynamically significant  stenosis.    2. Left lower extremity arterial ultrasound shows an occluded left external iliac artery.  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    CTA runoff 4/24/2019:  Patient is postop right iliac to femoral artery bypass.  There is some soft tissue stranding along the iliac graft similar to slightly increased compared to prior.  May be some narrowing at the origin of the graft.  Dilatation of the insertion may be related to surgical technique or pseudoaneurysm.  Correlation with operative procedure will be helpful.  There is decrease in the volume of fluid though a focal 1 cm collection does persist.  There is some increased soft tissue thickening about the insertion site as well as the skin.  Skin staples remain.    Occluded fem-fem bypass graft.    Significant vascular disease throughout the SFA and calf vessels bilaterally similar.    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    Cholelithiasis.    IMP/PLAN:  78 y.o. female with   Patient Active Problem List   Diagnosis    Lichen sclerosus et atrophicus    PVD (peripheral vascular disease)    Essential hypertension    Type 2 diabetes mellitus    Diastolic CHF, chronic    Obesity (BMI 30-39.9)    History of DVT (deep vein thrombosis)    Wound dehiscence    Fluid collection at surgical site    Cellulitis    Postoperative seroma of musculoskeletal structure after non-musculoskeletal procedure    Abscess of right groin    being managed by PCP and specialists who is here today for evaluation of RLE claudication, lifestyle limiting s/p R iliofemoral bypass 3/2019 with groin incision dehisence and superficial infection / deep seroma    -Recovering well s/p R iliofemoral bypass s/p wound washout and drainage of seroma with healing skin dehiscence of R groin inc at superior and mid aspect, no graft exposure or fascial dehiscence, 2+ DP with resolution of claudication and ischemic rest pain, now with healing wounds  although persistent induration to distal aspect of incision with superficial skin infection with min purulence and improvement of fluid collection on repeat CTA without abscess noted s/p R groin I&D of distal inc superficial skin infection and removal of embedded staple with improvement in wound and no further infection - cont local wound care keeping clean/dry, cont ID rec Abx  -Cont ASA, statin and Pletal  -Heart healthy lifestyle  -Cont smoking cessation  -RTC 1 week for wound check    I spent 20 minutes evaluating this patient and greater than 50% of the time was spent counseling, coordinator care and discussing the plan of care.  All questions were answered and patient stated understanding with agreement with the above treatment plan.    Danny Dunbar MD VI  [facility]ascular and Endovascular Surgery

## 2019-05-10 LAB
BACTERIA SPEC ANAEROBE CULT: NORMAL
BACTERIA SPEC ANAEROBE CULT: NORMAL

## 2019-05-14 LAB — FUNGUS SPEC CULT: NORMAL

## 2019-05-16 ENCOUNTER — OFFICE VISIT (OUTPATIENT)
Dept: VASCULAR SURGERY | Facility: CLINIC | Age: 79
End: 2019-05-16
Payer: MEDICARE

## 2019-05-16 VITALS
SYSTOLIC BLOOD PRESSURE: 132 MMHG | DIASTOLIC BLOOD PRESSURE: 52 MMHG | WEIGHT: 162.06 LBS | HEART RATE: 87 BPM | HEIGHT: 60 IN | BODY MASS INDEX: 31.82 KG/M2

## 2019-05-16 DIAGNOSIS — T81.31XD POSTOPERATIVE WOUND DEHISCENCE, SUBSEQUENT ENCOUNTER: Primary | ICD-10-CM

## 2019-05-16 PROCEDURE — 99999 PR PBB SHADOW E&M-EST. PATIENT-LVL III: ICD-10-PCS | Mod: PBBFAC,,, | Performed by: SURGERY

## 2019-05-16 PROCEDURE — 99999 PR PBB SHADOW E&M-EST. PATIENT-LVL III: CPT | Mod: PBBFAC,,, | Performed by: SURGERY

## 2019-05-16 PROCEDURE — 99024 PR POST-OP FOLLOW-UP VISIT: ICD-10-PCS | Mod: S$GLB,,, | Performed by: SURGERY

## 2019-05-16 PROCEDURE — 99024 POSTOP FOLLOW-UP VISIT: CPT | Mod: S$GLB,,, | Performed by: SURGERY

## 2019-05-16 NOTE — PROGRESS NOTES
Danny Dunbar MD [facility]                         05/16/2019    HPI:  Nelsy Marino is a 78 y.o. female with   Patient Active Problem List   Diagnosis    Lichen sclerosus et atrophicus    PVD (peripheral vascular disease)    Essential hypertension    Type 2 diabetes mellitus    Diastolic CHF, chronic    Obesity (BMI 30-39.9)    History of DVT (deep vein thrombosis)    Wound dehiscence    Fluid collection at surgical site    Cellulitis    Postoperative seroma of musculoskeletal structure after non-musculoskeletal procedure    Abscess of right groin    being managed by PCP and specialists who is here today for evaluation of RLE pain.  She is s/p fem-fem approx 20 yrs ago without issues until recently.  Fem-fem that was performed for RLE ALI was noted to be occluded on US at [facility].  Was scheduled for an angiogram at [facility] although was unable to proceed due to insurance purposes.  Patient states location is R calf and thigh occurring for 2 mo.  Associated signs and symptoms include none.  Quality is throbbing and severity is 9/10 at worst.  Symptoms began 2 mo ago.  Alleviating factors include rest.  Worsening factors include ambulation.  Pt states RLE claudication x 1 block.  States it is lifestyle limiting.     no MI  no Stroke  Tobacco use: quit 35 yrs ago    1/14/19: cont to c/o RLE pain, 1/2 block calf claudication.  Intermittent rest pain, no tissue loss.  Compliant with Pletal, has not noticed significant improvement.    2/14/19: Cont to experience 1/2 block R calf claudication.  No wounds.      4/8/19: s/p R iliofemoral bypass 3/20/19.  C/o R groin inc dehiscence by LUCIUS RN.  No F/C.  States RLE ischemic rest pain resolved, +ambulating better without issues.    4/11/19: Presents for further eval of R abundio inc dehiscence.  No F/C.    4/29/19: No new issues, states took all of PO Abx earlier than rec duration of 2 weeks by ID, no F/C.   States wound healing although has not used soap.  No RLE claudication or rest pain, no wounds to R foot.  States RLE pain resolved.    19:  On PO Abx.  Denies F/C.  Keeping clean with soap and water now.    Interval history:  Doing well, keeping inc clean/dry, no complaints, no F/C.  Remains on Abx per ID recs.    Past Medical History:   Diagnosis Date    CHF (congestive heart failure)     Diabetes mellitus     DVT (deep venous thrombosis)     Hypertension     Miscarriage      Past Surgical History:   Procedure Laterality Date     femoral vascular surgery       CREATION, BYPASS, ARTERIAL, ILIAC TO FEMORAL, RIGHT LOWER EXTREMITY, RIGHT PROFUNDAPLASTY Right 3/20/2019    [facility]    HYSTERECTOMY      ?unsure cervix present    Incision and Drainage - R groin washout, possible muscle flap Right 2019    Performed by [facility] MD at [facility] OR 2ND FLR    Incision and Drainage R groin Right 5/3/2019    Performed by Danny Dunbar MD at [facility] OR 2ND [facility]     Family History   Problem Relation Age of Onset    Diabetes Father     Hypertension Father      Social History     Socioeconomic History    Marital status:      Spouse name: Not on file    Number of children: Not on file    Years of education: Not on file    Highest education level: Not on file   Occupational History    Not on file   Social Needs    Financial resource strain: Not on file    Food insecurity:     Worry: Not on file     Inability: Not on file    Transportation needs:     Medical: Not on file     Non-medical: Not on file   Tobacco Use    Smoking status: Former Smoker     Last attempt to quit: 1980     Years since quittin.3    Smokeless tobacco: Never Used   Substance and Sexual Activity    Alcohol use: No    Drug use: No    Sexual activity: Never   Lifestyle    Physical activity:     Days per week: Not on file     Minutes per session: Not on file    Stress: Not  "on file   Relationships    Social connections:     Talks on phone: Not on file     Gets together: Not on file     Attends Confucianism service: Not on file     Active member of club or organization: Not on file     Attends meetings of clubs or organizations: Not on file     Relationship status: Not on file   Other Topics Concern    Not on file   Social History Narrative    Not on file       Current Outpatient Medications:     amlodipine (NORVASC) 10 MG tablet, Take 10 mg by mouth once daily., Disp: , Rfl:     aspirin (ECOTRIN) 81 MG EC tablet, Take 81 mg by mouth once daily., Disp: , Rfl:     BLOOD SUGAR DIAGNOSTIC (TRUE METRIX GLUCOSE TEST STRIP MISC), by Misc.(Non-Drug; Combo Route) route., Disp: , Rfl:     cilostazol (PLETAL) 50 MG Tab, Take 1 tablet (50 mg total) by mouth 2 (two) times daily. If patient tolerate medication after 4 weeks, increase dose to 100 mg twice daily., Disp: 60 tablet, Rfl: 11    doxycycline (VIBRA-TABS) 100 MG tablet, Take 1 tablet (100 mg total) by mouth 2 (two) times daily., Disp: 56 tablet, Rfl: 0    glipiZIDE (GLUCOTROL) 10 MG tablet, Take 10 mg by mouth 2 (two) times daily before meals., Disp: , Rfl:     insulin aspart protamine-insulin aspart (NOVOLOG 70/30) 100 unit/mL (70-30) InPn pen, Inject 45 Units into the skin every morning. , Disp: , Rfl:     insulin aspart protamine-insulin aspart (NOVOLOG MIX 70-30FLEXPEN U-100) 100 unit/mL (70-30) InPn pen, Inject 40 Units into the skin every evening., Disp: , Rfl:     insulin syringe-needle,dispos. 0.3 mL 30 gauge x 5/16" Syrg, by Misc.(Non-Drug; Combo Route) route., Disp: , Rfl:     levoFLOXacin (LEVAQUIN) 750 MG tablet, Take 1 tablet (750 mg total) by mouth once daily., Disp: 28 tablet, Rfl: 0    losartan-hydrochlorothiazide 100-25 mg (HYZAAR) 100-25 mg per tablet, Take 1 tablet by mouth once daily., Disp: , Rfl:     metformin (GLUCOPHAGE) 1000 MG tablet, Take 1,000 mg by mouth 2 (two) times daily with meals., Disp: , Rfl: "     oxyCODONE-acetaminophen (PERCOCET) 5-325 mg per tablet, Take 1 tablet by mouth every 4 (four) hours as needed., Disp: 15 tablet, Rfl: 0    REVIEW OF SYSTEMS:  General: No fevers or chills; ENT: No sore throat; Allergy and Immunology: no persistent infections; Hematological and Lymphatic: No history of bleeding or easy bruising; Endocrine: negative; Respiratory: no cough, shortness of breath, or wheezing; Cardiovascular: no chest pain or dyspnea on exertion; Gastrointestinal: no abdominal pain/back, change in bowel habits, or bloody stools; Genito-Urinary: no dysuria, trouble voiding, or hematuria; Musculoskeletal: negative; Neurological: no TIA or stroke symptoms; Psychiatric: no nervousness, anxiety or depression.    PHYSICAL EXAM:      Pulse: 87         General appearance:  Alert, well-appearing, and in no distress.  Oriented to person, place, and time                    Neurological: Normal speech, no focal findings noted; CN II - XII grossly intact. RLE with sensation to light touch, LLE with sensation to light touch.            Musculoskeletal: Digits/nail without cyanosis/clubbing.  Strength 5/5 BLE.                    Neck: Supple, no significant adenopathy                  Chest:  Clear to auscultation, no wheezes, rales or rhonchi, symmetric air entry. No use of accessory muscles               Cardiac: Normal rate and regular rhythm, S1 and S2 normal            Abdomen: Soft, nontender, nondistended, no masses or organomegaly, no hernia     No rebound tenderness noted; bowel sounds normal     R groin inc with minimal dehiscence superiorly of skin and to mid aspect healing well with +granulation tissue, no graft exposure, abdominal inc well healed without infection; groin wound healing well, no purulence from wound, no surrounding erythema, min dehiscence to mid aspect with +granulation tissue        Extremities:   2+ R femoral pulse, 2+ L femoral pulse     doppler+ R popliteal pulse, doppler+ L  popliteal pulse     doppler+ R PT pulse, doppler+ L PT pulse     2+ R DP pulse, doppler+ L DP pulse     no RLE edema, no LLE edema    Skin: RLE without tissue loss; LLE without tissue loss    LAB RESULTS:  No results found for: CBC  Lab Results   Component Value Date    LABPROT 11.4 05/02/2019    INR 1.1 05/02/2019     Lab Results   Component Value Date     05/04/2019    K 4.0 05/04/2019     05/04/2019    CO2 27 05/04/2019    GLU 99 05/04/2019    BUN 14 05/04/2019    CREATININE 1.1 05/04/2019    CALCIUM 9.5 05/04/2019    ANIONGAP 8 05/04/2019    EGFRNONAA 48.2 (A) 05/04/2019     Lab Results   Component Value Date    WBC 6.48 05/04/2019    RBC 4.18 05/04/2019    HGB 10.2 (L) 05/04/2019    HCT 33.4 (L) 05/04/2019    MCV 80 (L) 05/04/2019    MCH 24.4 (L) 05/04/2019    MCHC 30.5 (L) 05/04/2019    RDW 14.5 05/04/2019     05/04/2019    MPV 10.1 05/04/2019    GRAN 4.3 05/04/2019    GRAN 65.8 05/04/2019    LYMPH 1.5 05/04/2019    LYMPH 22.8 05/04/2019    MONO 0.3 05/04/2019    MONO 4.5 05/04/2019    EOS 0.4 05/04/2019    BASO 0.03 05/04/2019    EOSINOPHIL 6.2 05/04/2019    BASOPHIL 0.5 05/04/2019    DIFFMETHOD Automated 05/04/2019     .  Lab Results   Component Value Date    HGBA1C 9.7 (H) 03/20/2019       IMAGING:  All pertinent imaging has been reviewed and interpreted independently.    11/8/18: US showing occluded fem-fem, decreased velocity R CFA, HD significant stenosis R SFA    [facility] ARCHIE: 0.4/1    1/14/19: ARCHIE R 0.4/1.1, flat L toe waveform, R toe pressure 64 mmHg    2/2019: Stress test without evidence of ischemia.  Echo with normal function.    4/24/19:   ARCHIE   1. Right lower extremity pressures and waveforms indicate mild arterial occlusive disease.  2. Left lower extremity pressures and waveforms indicate no hemodynamically significant arterial occlusive disease.    BLE arterial US  1. Right lower extremity arterial ultrasound and pressures indicate no hemodynamically significant stenosis.  The  right iliac artery to femoral artery bypass is patent without evidence of hemodynamically significant stenosis.    2. Left lower extremity arterial ultrasound shows an occluded left external iliac artery.  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    CTA runoff 4/24/2019:  Patient is postop right iliac to femoral artery bypass.  There is some soft tissue stranding along the iliac graft similar to slightly increased compared to prior.  May be some narrowing at the origin of the graft.  Dilatation of the insertion may be related to surgical technique or pseudoaneurysm.  Correlation with operative procedure will be helpful.  There is decrease in the volume of fluid though a focal 1 cm collection does persist.  There is some increased soft tissue thickening about the insertion site as well as the skin.  Skin staples remain.  Occluded fem-fem bypass graft.  Significant vascular disease throughout the SFA and calf vessels bilaterally similar.  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  Cholelithiasis.    IMP/PLAN:  78 y.o. female with   Patient Active Problem List   Diagnosis    Lichen sclerosus et atrophicus    PVD (peripheral vascular disease)    Essential hypertension    Type 2 diabetes mellitus    Diastolic CHF, chronic    Obesity (BMI 30-39.9)    History of DVT (deep vein thrombosis)    Wound dehiscence    Fluid collection at surgical site    Cellulitis    Postoperative seroma of musculoskeletal structure after non-musculoskeletal procedure    Abscess of right groin    being managed by PCP and specialists who is here today for evaluation of RLE claudication, lifestyle limiting s/p R iliofemoral bypass 3/2019 with groin incision dehisence and superficial infection / deep seroma    -Recovering well s/p R iliofemoral bypass s/p wound washout and drainage of seroma with healing skin dehiscence of R groin inc at superior and mid aspect, no graft exposure or fascial  dehiscence, 2+ DP with resolution of claudication and ischemic rest pain, now with healing wounds although persistent induration to distal aspect of incision with superficial skin infection with min purulence and improvement of fluid collection on repeat CTA without abscess noted s/p R groin I&D of distal inc superficial skin infection and removal of embedded staple with improvement in wound and no further infection with wound healing well now - cont local wound care keeping clean/dry, cont ID rec Abx  -Cont ASA, statin and Pletal  -Heart healthy lifestyle  -Cont smoking cessation  -Plan for CTA runoff and US in 1 mo (6/2019)  -RTC 1 week for wound check    I spent 20 minutes evaluating this patient and greater than 50% of the time was spent counseling, coordinator care and discussing the plan of care.  All questions were answered and patient stated understanding with agreement with the above treatment plan.    Danny Dunbar MD [facility]  Vascular and Endovascular Surgery

## 2019-05-16 NOTE — LETTER
May 16, 2019        [place] [address] [address] [facility] [address]             [facility] - Vascular Surgery  [address] [facility] [address]  [facility] [address]  Phone: [phone]  Fax: [phone]   Patient: Nelsy Marino   MR Number: [id number]   YOB: 1940   Date of Visit: 5/16/2019       Dear Dr. Garay:    Thank you for referring Nelsy Marino to me for evaluation. Below are the relevant portions of my assessment and plan of care.            If you have questions, please do not hesitate to call me. I look forward to following Nelsy along with you.    Sincerely,      Danny Dunbar MD           CC  No Recipients

## 2019-05-23 ENCOUNTER — OFFICE VISIT (OUTPATIENT)
Dept: VASCULAR SURGERY | Facility: CLINIC | Age: 79
End: 2019-05-23
Payer: MEDICARE

## 2019-05-23 VITALS
WEIGHT: 164.88 LBS | SYSTOLIC BLOOD PRESSURE: 110 MMHG | DIASTOLIC BLOOD PRESSURE: 68 MMHG | BODY MASS INDEX: 32.37 KG/M2 | HEIGHT: 60 IN

## 2019-05-23 DIAGNOSIS — T81.31XD DEHISCENCE OF CLOSURE OF SKIN, SUBSEQUENT ENCOUNTER: Primary | ICD-10-CM

## 2019-05-23 PROCEDURE — 99024 POSTOP FOLLOW-UP VISIT: CPT | Mod: S$GLB,,, | Performed by: SURGERY

## 2019-05-23 PROCEDURE — 99999 PR PBB SHADOW E&M-EST. PATIENT-LVL III: CPT | Mod: PBBFAC,,, | Performed by: SURGERY

## 2019-05-23 PROCEDURE — 99999 PR PBB SHADOW E&M-EST. PATIENT-LVL III: ICD-10-PCS | Mod: PBBFAC,,, | Performed by: SURGERY

## 2019-05-23 PROCEDURE — 99024 PR POST-OP FOLLOW-UP VISIT: ICD-10-PCS | Mod: S$GLB,,, | Performed by: SURGERY

## 2019-05-23 NOTE — PATIENT INSTRUCTIONS
Understanding Peripheral Arterial Disease    Peripheral arteries deliver oxygen-rich blood to the tissues outside the heart. As you age, your arteries become stiffer and thicker. In addition, risk factors, such as smoking and high cholesterol, can damage the artery lining. This allows a buildup of fat and other materials (plaque) to form within the artery walls. The buildup of plaque narrows the space inside the artery and sometimes blocks blood flow. Peripheral arterial disease (PAD) happens when blood flow through the arteries is reduced because of plaque buildup. It often happens in the legs and feet, but can also happen elsewhere in the body. If this buildup happens in the a large artery in the neck (carotid artery), it can be a major contributor to stroke.  A healthy artery  An artery is a muscular tube that carries oxgen rich blood and nutrients from the heart to the rest of the body. It has a smooth lining and flexible walls that allow blood to pass freely. When active, muscles need more oxygen. This increases blood flow. Healthy arteries can adapt to meet this need.  A damaged artery    PAD begins when the lining of an artery is damaged. This is often because of risk factors, such as smoking, older age, or diabetes. Plaque then starts to form within the artery wall. At this stage, blood flows normally, so youre not likely to have symptoms.  A narrowed artery    If plaque continues to build up, the space inside the artery narrows. The artery walls become less able to expand. The artery still provides enough blood and oxygen to your muscles during rest. But when youre active, the increased demand for blood cant be met. As a result, your leg may cramp or ache when you walk.  A blocked artery    An artery can become blocked by plaque or by a blood clot lodged in a narrowed section. When this happens, oxygen cant reach the muscle below the blockage. Then you may feel pain when lying down or when you are not  active (rest pain). This type of pain is especially common at night when youre lying flat. In time, the affected tissue can die. This can lead to the loss of a toe or foot.  Date Last Reviewed: 5/1/2016 © 2000-2017 [employer]. [address] [place] [address]. All rights reserved. This information is not intended as a substitute for professional medical care. Always follow your healthcare professional's instructions.        Peripheral Artery Disease (PAD)     Peripheral artery disease (PAD) occurs when the arteries that carry blood to the limbs are narrowed or blocked. This is usually due to a buildup of a fatty substance called plaque in the walls of the arteries.  PAD most often affects the arteries in the legs. When these arteries are narrowed or blocked, blood flow to the legs is reduced. This can cause leg and foot pain and other symptoms. If severe enough, reduced blood flow to the legs can lead to tissue death (gangrene) and the loss of a toe, foot, or leg. Having PAD also makes it more likely that arteries in other body areas are blocked. For instance, arteries that carry blood to the heart or brain may be affected. This raises the chances of heart attack and stroke.  Risk factors  Certain factors can make PAD more likely. They include:  · Smoking  · Diabetes  · High blood pressure  · Unhealthy cholesterol levels  · Obesity  · Inactive lifestyle  · Older age  · Family history of PAD  Symptoms  Many people with PAD have no symptoms. If symptoms do occur, they can include:  · Pain in the muscles of the calves, thighs or hips that gets worse with activity and better with rest (intermittent claudication)  · Achy, tired, or heavy feeling in the legs  · Weakness, numbness, tingling, or loss of feeling in the legs  · Changes in skin color of the legs  · Sores on the legs and feet  · Cold leg, feet, or toes  · Pain the feet or toes even when lying down (rest pain)  Home care  PAD is a  chronic (lifelong) condition. Treatment is focused on managing your condition and lowering your health risks. This may include doing the following:  · If you smoke, quit. This helps prevent further damage to your arteries and lowers your health risks. Ask your provider about medicines or products that can help you quit smoking. Also consider joining a stop-smoking program or support group.  · Be more active. This helps you lose weight and manage problems such as high blood pressure and unhealthy cholesterol levels. Start a walking program if advised to by your provider. Your provider may also help you form a safe exercise program that is right for your needs.  · Make healthy eating changes. This includes eating less fat, salt, and sugar.  · Take medicines for high blood pressure, unhealthy cholesterol levels, and diabetes as directed.  · Have your blood pressure and cholesterol levels checked as often as directed.  · If you have diabetes, try to keep your blood sugar well controlled. Test your blood sugar as directed.  · If you are overweight, talk to your provider about forming a weight-loss plan.  · Watch for cuts, scrapes, or open sores on your feet. Poor blood flow to the feet may slow healing and increase the risk of infection from these problems.   Follow-up care  Follow up with your healthcare provider, or as advised. If imaging tests such as ultrasound were done, they will be reviewed by a doctor. You will be told the results and any new findings that may affect your care.  When to seek medical advice   Call your healthcare provider right away if any of these occur:  · Sudden severe pain in the legs or feet  · Sudden cold, pale or blue color in the legs or feet  · Weakness or numbness in the legs or feet that worsens  · Any sore or wound in the legs or feet that wont heal  · Weak pulse in your legs or feet  Know the Signs of Heart Attack and Stroke  People with PAD are at high risk for heart attack and  stroke. Knowing the signs of these problems can help you protect your health and get help when you need it. Call 911 right away if you have any of the following:  Signs of a Heart Attack  · Chest discomfort, such as pain, aching, tightness, or pressure that lasts more than a few minutes, or that comes and goes  · Pain or discomfort in the arms, back, shoulders, neck, or jaw  · Shortness of breath  · Sweating (often a cold, clammy sweat)  · Nausea  · Lightheadedness  Signs of a Stroke  · Sudden numbness or weakness of the face, arms, or legs, especially on one side  · Sudden confusion or trouble speaking or understanding  · Sudden trouble seeing in one or both eyes  · Sudden trouble walking, dizziness, or loss of balance  · Sudden, severe headache with no known cause   Date Last Reviewed: 9/21/2015  © 6458-7537 [employer]. [address] [place] [address]. All rights reserved. This information is not intended as a substitute for professional medical care. Always follow your healthcare professional's instructions.        A Walking Program for Peripheral Arterial Disease (PAD)  Peripheral arterial disease (PAD) is a condition where the arteries in the legs are severely damaged. When you have PAD, walking can be painful. So you may start to walk less. Walking less makes your leg muscles weaker. It then becomes harder and more painful to walk. A walking program can break this cycle. This sheet gives you tips on how to get started.     Walking farther without pain  Exercise strengthens leg muscles that are out of shape. It also trains these muscles to work with less oxygen. This helps your leg muscles work better even with reduced blood flow to your legs. When you have PAD, a walking program can be helpful. Your program can:  · Let you walk longer and farther without claudication. This is an ache in your legs during exercise that goes away with rest.  · Let you do more and be more active  · Add to  your overall health and well-being  · Help you control your blood sugar and blood pressure  · Help you become healthier with no risk and at little or no cost  Getting started  Your local hospital, vascular center, or cardiac rehab center may have a special walking program for people with PAD. If so, this is your best option. But if you cant find a program, or its not covered by insurance, you can still walk on your own. Follow these steps at each session:  · Step 1. Start at a pace that lets you walk for 5 to 10 minutes before you start to feel claudication. This feeling is unpleasant, but it doesnt hurt you. Keep going until the pain makes you feel that you need to stop.  · Step 2. Stop and rest for 3 to 5 minutes, just long enough for the pain to go away. You can rest standing or sitting. (Some people like to bring along a cane or a lightweight folding chair.)  · Step 3. Again walk at a pace that lets you walk for 5 to 10 minutes more before you feel pain. This may be slower than your starting pace in step 1. Then rest again.  · Step 4. Repeat this process until youve walked for 45 minutes. This should be about 60 to 80 minutes total, including resting time. You may not be able to do a full 45 minutes at first. Do as much as you can, and increase your time as you improve.  Making the most of your program  · Walk at least 3 times a week. Take no more than 2 days off between sessions. If you stop walking, even for a week or two, you can lose the health benefits of your program.  · Find a good place to walk. A treadmill or a track may be better at first. That way, you wont run the risk of going too far and finding that you cant walk back. Be sure to have a place to walk indoors in bad weather, such as a gym or a mall.  · Wear shoes with sturdy, flexible soles. The heel should fit without slipping. You should have room to wiggle your toes.  · Keep track of how long you walk. A pedometer will show your daily  progress. It can also show how much farther you can walk as time goes by.  · Ask a friend to keep you company. You may enjoy walking with someone else. Or you may want to make your walking sessions a time to relax by yourself.  · Make it fun. Listen to music while you walk and rest. Walk in a favorite park. Get to know the people at the gym, or the folks that you pass on your route. While you rest, you can window-shop, read, or feed the birds. Do whatever will help you enjoy your exercise sessions.  Date Last Reviewed: 6/1/2016  © 8884-1776 [employer]. [address] [place] [address], [place] [address]. All rights reserved. This information is not intended as a substitute for professional medical care. Always follow your healthcare professional's instructions.

## 2019-05-23 NOTE — LETTER
May 23, 2019        [place] [address] [place] [facility] [address]             [facility] [place] - Vascular Surgery  [address]  [facility] [address]  Phone: [phone]  Fax: [phone]   Patient: Nelsy Marino   MR Number: [id number]   YOB: 1940   Date of Visit: 5/23/2019       Dear Dr. Garay:    Thank you for referring Nelsy Marino to me for evaluation. Below are the relevant portions of my assessment and plan of care.            If you have questions, please do not hesitate to call me. I look forward to following Nelsy along with you.    Sincerely,      Danny Dunbar MD           CC  No Recipients

## 2019-05-24 ENCOUNTER — TELEPHONE (OUTPATIENT)
Dept: ADMINISTRATIVE | Facility: CLINIC | Age: 79
End: 2019-05-24

## 2019-06-04 LAB
FUNGUS SPEC CULT: NORMAL
FUNGUS SPEC CULT: NORMAL

## 2019-06-05 ENCOUNTER — OFFICE VISIT (OUTPATIENT)
Dept: INFECTIOUS DISEASES | Facility: CLINIC | Age: 79
End: 2019-06-05
Payer: MEDICARE

## 2019-06-05 VITALS
TEMPERATURE: 99 F | WEIGHT: 165.13 LBS | HEART RATE: 90 BPM | DIASTOLIC BLOOD PRESSURE: 61 MMHG | BODY MASS INDEX: 32.42 KG/M2 | SYSTOLIC BLOOD PRESSURE: 124 MMHG | HEIGHT: 60 IN

## 2019-06-05 DIAGNOSIS — L02.214 ABSCESS OF GROIN, RIGHT: Primary | ICD-10-CM

## 2019-06-05 DIAGNOSIS — T81.30XA WOUND DEHISCENCE: ICD-10-CM

## 2019-06-05 PROCEDURE — 99213 OFFICE O/P EST LOW 20 MIN: CPT | Mod: S$GLB,,, | Performed by: PHYSICIAN ASSISTANT

## 2019-06-05 PROCEDURE — 3078F PR MOST RECENT DIASTOLIC BLOOD PRESSURE < 80 MM HG: ICD-10-PCS | Mod: CPTII,S$GLB,, | Performed by: PHYSICIAN ASSISTANT

## 2019-06-05 PROCEDURE — 99999 PR PBB SHADOW E&M-EST. PATIENT-LVL III: CPT | Mod: PBBFAC,,, | Performed by: PHYSICIAN ASSISTANT

## 2019-06-05 PROCEDURE — 99999 PR PBB SHADOW E&M-EST. PATIENT-LVL III: ICD-10-PCS | Mod: PBBFAC,,, | Performed by: PHYSICIAN ASSISTANT

## 2019-06-05 PROCEDURE — 1101F PR PT FALLS ASSESS DOC 0-1 FALLS W/OUT INJ PAST YR: ICD-10-PCS | Mod: CPTII,S$GLB,, | Performed by: PHYSICIAN ASSISTANT

## 2019-06-05 PROCEDURE — 3074F SYST BP LT 130 MM HG: CPT | Mod: CPTII,S$GLB,, | Performed by: PHYSICIAN ASSISTANT

## 2019-06-05 PROCEDURE — 1101F PT FALLS ASSESS-DOCD LE1/YR: CPT | Mod: CPTII,S$GLB,, | Performed by: PHYSICIAN ASSISTANT

## 2019-06-05 PROCEDURE — 3078F DIAST BP <80 MM HG: CPT | Mod: CPTII,S$GLB,, | Performed by: PHYSICIAN ASSISTANT

## 2019-06-05 PROCEDURE — 99213 PR OFFICE/OUTPT VISIT, EST, LEVL III, 20-29 MIN: ICD-10-PCS | Mod: S$GLB,,, | Performed by: PHYSICIAN ASSISTANT

## 2019-06-05 PROCEDURE — 3074F PR MOST RECENT SYSTOLIC BLOOD PRESSURE < 130 MM HG: ICD-10-PCS | Mod: CPTII,S$GLB,, | Performed by: PHYSICIAN ASSISTANT

## 2019-06-05 RX ORDER — ONDANSETRON HYDROCHLORIDE 8 MG/1
TABLET, FILM COATED ORAL
Refills: 0 | Status: ON HOLD | COMMUNITY
Start: 2019-05-06 | End: 2023-04-26

## 2019-06-05 NOTE — PROGRESS NOTES
Subjective:      Patient ID: Nelsy Marino is a 78 y.o. female.    Chief Complaint:Hospital Follow Up      History of Present Illness    Nelsy Marino is a 78 year old female w/ HTN, DMII, DVT, CHF, PAD, fem-fem bypass presents to clinic for end of abx therapy. She completed 4 weeks of levofloxacin and doxycycline on 6/1/19. She is seen today with her .    3/20/19: R R iliofemoral bypass  2/2 occlusion  4/12/19: surgical I&D. No purulent fluid. Wound cultures from swab + proteus. Surgical cultures +genital damir. Patient was discharged on levofloxacin 500mg po q 48hr for skin/soft tissue infection x 14 days.   5/3/19: repeat I&D as repeat imaging showed persistent small fluid collection. No purulent encountered. Surgical cultures NGTD. Discharged on levofloxacin and doxycycline x 4 weeks (end date 6/1/19)    She is seen today for follow up with her  at bedside. Overall feels well. Denies fevers, chills, sweats, purulent drainage from wound or current groin pain.. No issues since completion of antibiotics. Has f/u with vascular tomorrow and f/u with PCP on Friday.       Review of Systems   Constitution: Positive for weight loss. Negative for chills, decreased appetite, fever, malaise/fatigue, night sweats and weight gain.   HENT: Negative for congestion, ear pain, hearing loss, hoarse voice, sore throat and tinnitus.    Eyes: Negative for blurred vision, redness and visual disturbance.   Cardiovascular: Positive for chest pain. Negative for leg swelling and palpitations.   Respiratory: Negative for cough, hemoptysis, shortness of breath and sputum production.    Hematologic/Lymphatic: Negative for adenopathy. Does not bruise/bleed easily.   Skin: Positive for itching. Negative for dry skin, rash and suspicious lesions.   Musculoskeletal: Positive for back pain and joint pain. Negative for myalgias and neck pain.   Gastrointestinal: Positive for heartburn. Negative for abdominal pain, constipation,  diarrhea, nausea and vomiting.   Genitourinary: Negative for dysuria, flank pain, frequency, hematuria, hesitancy and urgency.   Neurological: Positive for dizziness and headaches. Negative for numbness, paresthesias and weakness.   Psychiatric/Behavioral: Negative for depression and memory loss. The patient does not have insomnia and is not nervous/anxious.      Objective:   Physical Exam   Constitutional: She is oriented to person, place, and time. She appears well-developed and well-nourished. No distress.   HENT:   Head: Normocephalic and atraumatic.   Mouth/Throat: Uvula is midline, oropharynx is clear and moist and mucous membranes are normal. No oral lesions.   Eyes: Conjunctivae are normal. No scleral icterus.   Cardiovascular: Normal rate, regular rhythm and normal heart sounds.   No murmur heard.  Pulmonary/Chest: Effort normal and breath sounds normal. No respiratory distress. She has no wheezes. She has no rales.   Abdominal: Soft. She exhibits no distension. There is no hepatosplenomegaly. There is no tenderness.   Musculoskeletal: She exhibits no edema.   Neurological: She is alert and oriented to person, place, and time.   Skin: Skin is warm, dry and intact. No rash noted.   Right groin wound with granular base. No purulence, surrounding erythema, warmth, induration or signs of infection.    Psychiatric: She has a normal mood and affect. Her behavior is normal.     Assessment:       1. Abscess of groin, right    2. Wound dehiscence          Plan:       1. Completed course of abx on 6/1. No fever chills or night sweats. No fluctuance, redness, pain, tenderness or open wounds seen. No drainage. Wounds well healed.   2. F/u with vascular as scheduled  3. See us as needed. Seek immediate attention if with fever chills or wound dehiscence or drainage     All questions answered

## 2019-06-06 ENCOUNTER — OFFICE VISIT (OUTPATIENT)
Dept: VASCULAR SURGERY | Facility: CLINIC | Age: 79
End: 2019-06-06
Payer: MEDICARE

## 2019-06-06 ENCOUNTER — TELEPHONE (OUTPATIENT)
Dept: INFECTIOUS DISEASES | Facility: CLINIC | Age: 79
End: 2019-06-06

## 2019-06-06 VITALS
DIASTOLIC BLOOD PRESSURE: 54 MMHG | BODY MASS INDEX: 32.37 KG/M2 | SYSTOLIC BLOOD PRESSURE: 130 MMHG | HEIGHT: 60 IN | HEART RATE: 89 BPM | WEIGHT: 164.88 LBS

## 2019-06-06 DIAGNOSIS — I73.9 PVD (PERIPHERAL VASCULAR DISEASE): Primary | ICD-10-CM

## 2019-06-06 DIAGNOSIS — I73.9 VASCULAR CLAUDICATION: ICD-10-CM

## 2019-06-06 DIAGNOSIS — L02.214 GROIN ABSCESS: Primary | ICD-10-CM

## 2019-06-06 PROCEDURE — 99024 POSTOP FOLLOW-UP VISIT: CPT | Mod: S$GLB,,, | Performed by: SURGERY

## 2019-06-06 PROCEDURE — 99024 PR POST-OP FOLLOW-UP VISIT: ICD-10-PCS | Mod: S$GLB,,, | Performed by: SURGERY

## 2019-06-06 PROCEDURE — 99999 PR PBB SHADOW E&M-EST. PATIENT-LVL III: ICD-10-PCS | Mod: PBBFAC,,, | Performed by: SURGERY

## 2019-06-06 PROCEDURE — 99999 PR PBB SHADOW E&M-EST. PATIENT-LVL III: CPT | Mod: PBBFAC,,, | Performed by: SURGERY

## 2019-06-06 RX ORDER — CEFADROXIL 1000 MG/1
1 TABLET ORAL 2 TIMES DAILY
Qty: 60 TABLET | Refills: 6 | Status: SHIPPED | OUTPATIENT
Start: 2019-06-06 | End: 2019-06-07 | Stop reason: ALTCHOICE

## 2019-06-06 NOTE — TELEPHONE ENCOUNTER
Discussed with vascular surgery,  Will start cefadroxil 1g po bid for abx suppression as with retained graft   Discussed with    prescription sent to preferred pharmacy     Ward Garza

## 2019-06-06 NOTE — PROGRESS NOTES
Danny Dunbar MD [facility]                         06/06/2019    HPI:  Nelsy Marino is a 78 y.o. female with   Patient Active Problem List   Diagnosis    Lichen sclerosus et atrophicus    PVD (peripheral vascular disease)    Essential hypertension    Type 2 diabetes mellitus    Diastolic CHF, chronic    Obesity (BMI 30-39.9)    History of DVT (deep vein thrombosis)    Wound dehiscence    Fluid collection at surgical site    Cellulitis    Postoperative seroma of musculoskeletal structure after non-musculoskeletal procedure    Abscess of right groin    being managed by PCP and specialists who is here today for evaluation of RLE pain.  She is s/p fem-fem approx 20 yrs ago without issues until recently.  Fem-fem that was performed for RLE ALI was noted to be occluded on US at [facility].  Was scheduled for an angiogram at [facility] although was unable to proceed due to insurance purposes.  Patient states location is R calf and thigh occurring for 2 mo.  Associated signs and symptoms include none.  Quality is throbbing and severity is 9/10 at worst.  Symptoms began 2 mo ago.  Alleviating factors include rest.  Worsening factors include ambulation.  Pt states RLE claudication x 1 block.  States it is lifestyle limiting.     no MI  no Stroke  Tobacco use: quit 35 yrs ago    1/14/19: cont to c/o RLE pain, 1/2 block calf claudication.  Intermittent rest pain, no tissue loss.  Compliant with Pletal, has not noticed significant improvement.    2/14/19: Cont to experience 1/2 block R calf claudication.  No wounds.      4/8/19: s/p R iliofemoral bypass 3/20/19.  C/o R groin inc dehiscence by LUCIUS RN.  No F/C.  States RLE ischemic rest pain resolved, +ambulating better without issues.    4/11/19: Presents for further eval of R abundio inc dehiscence.  No F/C.    4/29/19: No new issues, states took all of PO Abx earlier than rec duration of 2 weeks by ID, no F/C.   States wound healing although has not used soap.  No RLE claudication or rest pain, no wounds to R foot.  States RLE pain resolved.    19:  On PO Abx.  Denies F/C.  Keeping clean with soap and water now.    19:  Doing well, keeping inc clean/dry, no complaints, no F/C.  Remains on Abx per ID recs.    19: No new issues, no F/C.  Remains on Abx.    Interval history:  No new issues or complaints.  No F/C.  R foot paresthesias resolved.    Past Medical History:   Diagnosis Date    CHF (congestive heart failure)     Diabetes mellitus     DVT (deep venous thrombosis)     Hypertension     Miscarriage      Past Surgical History:   Procedure Laterality Date     femoral vascular surgery       CREATION, BYPASS, ARTERIAL, ILIAC TO FEMORAL, RIGHT LOWER EXTREMITY, RIGHT PROFUNDAPLASTY Right 3/20/2019    [facility]    HYSTERECTOMY      ?unsure cervix present    Incision and Drainage - R groin washout, possible muscle flap Right 2019    Performed by Danny [facility]bar MD at [facility]    Incision and Drainage R groin Right 5/3/2019    Performed by Danny Dunbar MD at [facility] OR 2ND [facility]     Family History   Problem Relation Age of Onset    Diabetes Father     Hypertension Father      Social History     Socioeconomic History    Marital status:      Spouse name: Not on file    Number of children: Not on file    Years of education: Not on file    Highest education level: Not on file   Occupational History    Not on file   Social Needs    Financial resource strain: Not on file    Food insecurity:     Worry: Not on file     Inability: Not on file    Transportation needs:     Medical: Not on file     Non-medical: Not on file   Tobacco Use    Smoking status: Former Smoker     Last attempt to quit: 1980     Years since quittin.4    Smokeless tobacco: Never Used   Substance and Sexual Activity    Alcohol use: No    Drug use: No    Sexual  "activity: Never   Lifestyle    Physical activity:     Days per week: Not on file     Minutes per session: Not on file    Stress: Not on file   Relationships    Social connections:     Talks on phone: Not on file     Gets together: Not on file     Attends Mandaeism service: Not on file     Active member of club or organization: Not on file     Attends meetings of clubs or organizations: Not on file     Relationship status: Not on file   Other Topics Concern    Not on file   Social History Narrative    Not on file       Current Outpatient Medications:     amlodipine (NORVASC) 10 MG tablet, Take 10 mg by mouth once daily., Disp: , Rfl:     aspirin (ECOTRIN) 81 MG EC tablet, Take 81 mg by mouth once daily., Disp: , Rfl:     BLOOD SUGAR DIAGNOSTIC (TRUE METRIX GLUCOSE TEST STRIP MISC), by Misc.(Non-Drug; Combo Route) route., Disp: , Rfl:     cilostazol (PLETAL) 50 MG Tab, Take 1 tablet (50 mg total) by mouth 2 (two) times daily. If patient tolerate medication after 4 weeks, increase dose to 100 mg twice daily., Disp: 60 tablet, Rfl: 11    glipiZIDE (GLUCOTROL) 10 MG tablet, Take 10 mg by mouth 2 (two) times daily before meals., Disp: , Rfl:     insulin aspart protamine-insulin aspart (NOVOLOG 70/30) 100 unit/mL (70-30) InPn pen, Inject 45 Units into the skin every morning. , Disp: , Rfl:     insulin aspart protamine-insulin aspart (NOVOLOG MIX 70-30FLEXPEN U-100) 100 unit/mL (70-30) InPn pen, Inject 40 Units into the skin every evening., Disp: , Rfl:     insulin syringe-needle,dispos. 0.3 mL 30 gauge x 5/16" Syrg, by Misc.(Non-Drug; Combo Route) route., Disp: , Rfl:     losartan-hydrochlorothiazide 100-25 mg (HYZAAR) 100-25 mg per tablet, Take 1 tablet by mouth once daily., Disp: , Rfl:     metformin (GLUCOPHAGE) 1000 MG tablet, Take 1,000 mg by mouth 2 (two) times daily with meals., Disp: , Rfl:     ondansetron (ZOFRAN) 8 MG tablet, , Disp: , Rfl: 0    oxyCODONE-acetaminophen (PERCOCET) 5-325 mg per " tablet, Take 1 tablet by mouth every 4 (four) hours as needed., Disp: 15 tablet, Rfl: 0    REVIEW OF SYSTEMS:  General: No fevers or chills; ENT: No sore throat; Allergy and Immunology: no persistent infections; Hematological and Lymphatic: No history of bleeding or easy bruising; Endocrine: negative; Respiratory: no cough, shortness of breath, or wheezing; Cardiovascular: no chest pain or dyspnea on exertion; Gastrointestinal: no abdominal pain/back, change in bowel habits, or bloody stools; Genito-Urinary: no dysuria, trouble voiding, or hematuria; Musculoskeletal: negative; Neurological: no TIA or stroke symptoms; Psychiatric: no nervousness, anxiety or depression.    PHYSICAL EXAM:      Pulse: 89         General appearance:  Alert, well-appearing, and in no distress.  Oriented to person, place, and time                    Neurological: Normal speech, no focal findings noted; CN II - XII grossly intact. RLE with sensation to light touch, LLE with sensation to light touch.            Musculoskeletal: Digits/nail without cyanosis/clubbing.  Strength 5/5 BLE.                    Neck: Supple, no significant adenopathy                  Chest:  Clear to auscultation, no wheezes, rales or rhonchi, symmetric air entry. No use of accessory muscles               Cardiac: Normal rate and regular rhythm, S1 and S2 normal            Abdomen: Soft, nontender, nondistended, no masses or organomegaly, no hernia     No rebound tenderness noted; bowel sounds normal     R groin inc with minimal dehiscence superiorly of skin and to mid aspect healing well with +granulation tissue, no graft exposure, abdominal inc well healed without infection; groin wound healing well, no purulence from wound, no surrounding erythema, min dehiscence to mid aspect with +granulation tissue, improving       Extremities:   2+ R femoral pulse, 2+ L femoral pulse     doppler+ R popliteal pulse, doppler+ L popliteal pulse     doppler+ R PT pulse, doppler+  L PT pulse     2+ R DP pulse, doppler+ L DP pulse     no RLE edema, no LLE edema    Skin: RLE without tissue loss; LLE without tissue loss    LAB RESULTS:  No results found for: CBC  Lab Results   Component Value Date    LABPROT 11.4 05/02/2019    INR 1.1 05/02/2019     Lab Results   Component Value Date     05/04/2019    K 4.0 05/04/2019     05/04/2019    CO2 27 05/04/2019    GLU 99 05/04/2019    BUN 14 05/04/2019    CREATININE 1.1 05/04/2019    CALCIUM 9.5 05/04/2019    ANIONGAP 8 05/04/2019    EGFRNONAA 48.2 (A) 05/04/2019     Lab Results   Component Value Date    WBC 6.48 05/04/2019    RBC 4.18 05/04/2019    HGB 10.2 (L) 05/04/2019    HCT 33.4 (L) 05/04/2019    MCV 80 (L) 05/04/2019    MCH 24.4 (L) 05/04/2019    MCHC 30.5 (L) 05/04/2019    RDW 14.5 05/04/2019     05/04/2019    MPV 10.1 05/04/2019    GRAN 4.3 05/04/2019    GRAN 65.8 05/04/2019    LYMPH 1.5 05/04/2019    LYMPH 22.8 05/04/2019    MONO 0.3 05/04/2019    MONO 4.5 05/04/2019    EOS 0.4 05/04/2019    BASO 0.03 05/04/2019    EOSINOPHIL 6.2 05/04/2019    BASOPHIL 0.5 05/04/2019    DIFFMETHOD Automated 05/04/2019     .  Lab Results   Component Value Date    HGBA1C 9.7 (H) 03/20/2019       IMAGING:  All pertinent imaging has been reviewed and interpreted independently.    11/8/18: US showing occluded fem-fem, decreased velocity R CFA, HD significant stenosis R SFA    [facility] ARCHIE: 0.4/1    1/14/19: ARCHIE R 0.4/1.1, flat L toe waveform, R toe pressure 64 mmHg    2/2019: Stress test without evidence of ischemia.  Echo with normal function.    4/24/19:   ARCHIE   1. Right lower extremity pressures and waveforms indicate mild arterial occlusive disease.  2. Left lower extremity pressures and waveforms indicate no hemodynamically significant arterial occlusive disease.    BLE arterial US  1. Right lower extremity arterial ultrasound and pressures indicate no hemodynamically significant stenosis.  The right iliac artery to femoral artery bypass is  patent without evidence of hemodynamically significant stenosis.    2. Left lower extremity arterial ultrasound shows an occluded left external iliac artery.  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    CTA runoff 4/24/2019:  Patient is postop right iliac to femoral artery bypass.  There is some soft tissue stranding along the iliac graft similar to slightly increased compared to prior.  May be some narrowing at the origin of the graft.  Dilatation of the insertion may be related to surgical technique or pseudoaneurysm.  Correlation with operative procedure will be helpful.  There is decrease in the volume of fluid though a focal 1 cm collection does persist.  There is some increased soft tissue thickening about the insertion site as well as the skin.  Skin staples remain.  Occluded fem-fem bypass graft.  Significant vascular disease throughout the SFA and calf vessels bilaterally similar.  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  Cholelithiasis.    IMP/PLAN:  78 y.o. female with   Patient Active Problem List   Diagnosis    Lichen sclerosus et atrophicus    PVD (peripheral vascular disease)    Essential hypertension    Type 2 diabetes mellitus    Diastolic CHF, chronic    Obesity (BMI 30-39.9)    History of DVT (deep vein thrombosis)    Wound dehiscence    Fluid collection at surgical site    Cellulitis    Postoperative seroma of musculoskeletal structure after non-musculoskeletal procedure    Abscess of right groin    being managed by PCP and specialists who is here today for evaluation of RLE claudication, lifestyle limiting s/p R iliofemoral bypass 3/2019 with groin incision dehisence and superficial infection / deep seroma    -Recovering well s/p R iliofemoral bypass s/p wound washout and drainage of seroma with healing skin dehiscence of R groin inc at superior and mid aspect, no graft exposure or fascial dehiscence, 2+ DP with resolution of claudication and  ischemic rest pain, now with healing wounds s/p R groin I&D of distal inc superficial skin infection and removal of embedded staple with improvement in wound and no further infection with wound healing well now - cont local wound care keeping clean/dry, cont ID f/u   -Cont ASA, statin and Pletal  -Heart healthy lifestyle  -Cont smoking cessation  -Plan for CTA runoff and US in 3 weeks  -RTC 3 weeks    I spent 20 minutes evaluating this patient and greater than 50% of the time was spent counseling, coordinator care and discussing the plan of care.  All questions were answered and patient stated understanding with agreement with the above treatment plan.    Danny Dunbar MD [facility]  Vascular and Endovascular Surgery

## 2019-06-06 NOTE — PATIENT INSTRUCTIONS
Understanding Peripheral Arterial Disease    Peripheral arteries deliver oxygen-rich blood to the tissues outside the heart. As you age, your arteries become stiffer and thicker. In addition, risk factors, such as smoking and high cholesterol, can damage the artery lining. This allows a buildup of fat and other materials (plaque) to form within the artery walls. The buildup of plaque narrows the space inside the artery and sometimes blocks blood flow. Peripheral arterial disease (PAD) happens when blood flow through the arteries is reduced because of plaque buildup. It often happens in the legs and feet, but can also happen elsewhere in the body. If this buildup happens in the a large artery in the neck (carotid artery), it can be a major contributor to stroke.  A healthy artery  An artery is a muscular tube that carries oxgen rich blood and nutrients from the heart to the rest of the body. It has a smooth lining and flexible walls that allow blood to pass freely. When active, muscles need more oxygen. This increases blood flow. Healthy arteries can adapt to meet this need.  A damaged artery    PAD begins when the lining of an artery is damaged. This is often because of risk factors, such as smoking, older age, or diabetes. Plaque then starts to form within the artery wall. At this stage, blood flows normally, so youre not likely to have symptoms.  A narrowed artery    If plaque continues to build up, the space inside the artery narrows. The artery walls become less able to expand. The artery still provides enough blood and oxygen to your muscles during rest. But when youre active, the increased demand for blood cant be met. As a result, your leg may cramp or ache when you walk.  A blocked artery    An artery can become blocked by plaque or by a blood clot lodged in a narrowed section. When this happens, oxygen cant reach the muscle below the blockage. Then you may feel pain when lying down or when you are not  active (rest pain). This type of pain is especially common at night when youre lying flat. In time, the affected tissue can die. This can lead to the loss of a toe or foot.  Date Last Reviewed: 5/1/2016 © 2000-2017 [employer]. [address] [place] [address]. All rights reserved. This information is not intended as a substitute for professional medical care. Always follow your healthcare professional's instructions.        Peripheral Artery Disease (PAD)     Peripheral artery disease (PAD) occurs when the arteries that carry blood to the limbs are narrowed or blocked. This is usually due to a buildup of a fatty substance called plaque in the walls of the arteries.  PAD most often affects the arteries in the legs. When these arteries are narrowed or blocked, blood flow to the legs is reduced. This can cause leg and foot pain and other symptoms. If severe enough, reduced blood flow to the legs can lead to tissue death (gangrene) and the loss of a toe, foot, or leg. Having PAD also makes it more likely that arteries in other body areas are blocked. For instance, arteries that carry blood to the heart or brain may be affected. This raises the chances of heart attack and stroke.  Risk factors  Certain factors can make PAD more likely. They include:  · Smoking  · Diabetes  · High blood pressure  · Unhealthy cholesterol levels  · Obesity  · Inactive lifestyle  · Older age  · Family history of PAD  Symptoms  Many people with PAD have no symptoms. If symptoms do occur, they can include:  · Pain in the muscles of the calves, thighs or hips that gets worse with activity and better with rest (intermittent claudication)  · Achy, tired, or heavy feeling in the legs  · Weakness, numbness, tingling, or loss of feeling in the legs  · Changes in skin color of the legs  · Sores on the legs and feet  · Cold leg, feet, or toes  · Pain the feet or toes even when lying down (rest pain)  Home care  PAD is a  chronic (lifelong) condition. Treatment is focused on managing your condition and lowering your health risks. This may include doing the following:  · If you smoke, quit. This helps prevent further damage to your arteries and lowers your health risks. Ask your provider about medicines or products that can help you quit smoking. Also consider joining a stop-smoking program or support group.  · Be more active. This helps you lose weight and manage problems such as high blood pressure and unhealthy cholesterol levels. Start a walking program if advised to by your provider. Your provider may also help you form a safe exercise program that is right for your needs.  · Make healthy eating changes. This includes eating less fat, salt, and sugar.  · Take medicines for high blood pressure, unhealthy cholesterol levels, and diabetes as directed.  · Have your blood pressure and cholesterol levels checked as often as directed.  · If you have diabetes, try to keep your blood sugar well controlled. Test your blood sugar as directed.  · If you are overweight, talk to your provider about forming a weight-loss plan.  · Watch for cuts, scrapes, or open sores on your feet. Poor blood flow to the feet may slow healing and increase the risk of infection from these problems.   Follow-up care  Follow up with your healthcare provider, or as advised. If imaging tests such as ultrasound were done, they will be reviewed by a doctor. You will be told the results and any new findings that may affect your care.  When to seek medical advice   Call your healthcare provider right away if any of these occur:  · Sudden severe pain in the legs or feet  · Sudden cold, pale or blue color in the legs or feet  · Weakness or numbness in the legs or feet that worsens  · Any sore or wound in the legs or feet that wont heal  · Weak pulse in your legs or feet  Know the Signs of Heart Attack and Stroke  People with PAD are at high risk for heart attack and  stroke. Knowing the signs of these problems can help you protect your health and get help when you need it. Call 911 right away if you have any of the following:  Signs of a Heart Attack  · Chest discomfort, such as pain, aching, tightness, or pressure that lasts more than a few minutes, or that comes and goes  · Pain or discomfort in the arms, back, shoulders, neck, or jaw  · Shortness of breath  · Sweating (often a cold, clammy sweat)  · Nausea  · Lightheadedness  Signs of a Stroke  · Sudden numbness or weakness of the face, arms, or legs, especially on one side  · Sudden confusion or trouble speaking or understanding  · Sudden trouble seeing in one or both eyes  · Sudden trouble walking, dizziness, or loss of balance  · Sudden, severe headache with no known cause   Date Last Reviewed: 9/21/2015  © [address]. [address] [place] [address]. All rights reserved. This information is not intended as a substitute for professional medical care. Always follow your healthcare professional's instructions.        A Walking Program for Peripheral Arterial Disease (PAD)  Peripheral arterial disease (PAD) is a condition where the arteries in the legs are severely damaged. When you have PAD, walking can be painful. So you may start to walk less. Walking less makes your leg muscles weaker. It then becomes harder and more painful to walk. A walking program can break this cycle. This sheet gives you tips on how to get started.     Walking farther without pain  Exercise strengthens leg muscles that are out of shape. It also trains these muscles to work with less oxygen. This helps your leg muscles work better even with reduced blood flow to your legs. When you have PAD, a walking program can be helpful. Your program can:  · Let you walk longer and farther without claudication. This is an ache in your legs during exercise that goes away with rest.  · Let you do more and be more active  · Add to  your overall health and well-being  · Help you control your blood sugar and blood pressure  · Help you become healthier with no risk and at little or no cost  Getting started  Your local hospital, vascular center, or cardiac rehab center may have a special walking program for people with PAD. If so, this is your best option. But if you cant find a program, or its not covered by insurance, you can still walk on your own. Follow these steps at each session:  · Step 1. Start at a pace that lets you walk for 5 to 10 minutes before you start to feel claudication. This feeling is unpleasant, but it doesnt hurt you. Keep going until the pain makes you feel that you need to stop.  · Step 2. Stop and rest for 3 to 5 minutes, just long enough for the pain to go away. You can rest standing or sitting. (Some people like to bring along a cane or a lightweight folding chair.)  · Step 3. Again walk at a pace that lets you walk for 5 to 10 minutes more before you feel pain. This may be slower than your starting pace in step 1. Then rest again.  · Step 4. Repeat this process until youve walked for 45 minutes. This should be about 60 to 80 minutes total, including resting time. You may not be able to do a full 45 minutes at first. Do as much as you can, and increase your time as you improve.  Making the most of your program  · Walk at least 3 times a week. Take no more than 2 days off between sessions. If you stop walking, even for a week or two, you can lose the health benefits of your program.  · Find a good place to walk. A treadmill or a track may be better at first. That way, you wont run the risk of going too far and finding that you cant walk back. Be sure to have a place to walk indoors in bad weather, such as a gym or a mall.  · Wear shoes with sturdy, flexible soles. The heel should fit without slipping. You should have room to wiggle your toes.  · Keep track of how long you walk. A pedometer will show your daily  progress. It can also show how much farther you can walk as time goes by.  · Ask a friend to keep you company. You may enjoy walking with someone else. Or you may want to make your walking sessions a time to relax by yourself.  · Make it fun. Listen to music while you walk and rest. Walk in a favorite park. Get to know the people at the gym, or the folks that you pass on your route. While you rest, you can window-shop, read, or feed the birds. Do whatever will help you enjoy your exercise sessions.  Date Last Reviewed: 6/1/2016  © 5933-0539 [employer]. [address] [place] [address], [place] [address]. All rights reserved. This information is not intended as a substitute for professional medical care. Always follow your healthcare professional's instructions.

## 2019-06-06 NOTE — LETTER
June 6, 2019        [place] [address] [address] [facility] [address]             [facility] - Vascular Surgery  [address] [facility]  [facility] [address]  Phone: [phone]  Fax: [phone]   Patient: Nelsy Marino   MR Number: [id number]   YOB: 1940   Date of Visit: 6/6/2019       Dear Dr. Garay:    Thank you for referring Nelsy Marino to me for evaluation. Below are the relevant portions of my assessment and plan of care.            If you have questions, please do not hesitate to call me. I look forward to following Nelsy along with you.    Sincerely,      Danny Dunbar MD           CC  No Recipients

## 2019-06-07 ENCOUNTER — TELEPHONE (OUTPATIENT)
Dept: INFECTIOUS DISEASES | Facility: HOSPITAL | Age: 79
End: 2019-06-07

## 2019-06-07 DIAGNOSIS — L02.214 GROIN ABSCESS: Primary | ICD-10-CM

## 2019-06-07 RX ORDER — CEPHALEXIN 500 MG/1
500 CAPSULE ORAL EVERY 8 HOURS
Qty: 90 CAPSULE | Refills: 6 | Status: SHIPPED | OUTPATIENT
Start: 2019-06-07 | End: 2019-07-07

## 2019-06-14 LAB
ACID FAST MOD KINY STN SPEC: NORMAL
MYCOBACTERIUM SPEC QL CULT: NORMAL

## 2019-06-17 ENCOUNTER — TELEPHONE (OUTPATIENT)
Dept: VASCULAR SURGERY | Facility: CLINIC | Age: 79
End: 2019-06-17

## 2019-06-17 NOTE — TELEPHONE ENCOUNTER
HH nurse called about patient having some tingling in her legs when she walks. She stated that her wound is completely healed. Explained that she has a f/u appt with our office on the 24th and she has many tests to check all of her blood flow. She stated understanding.

## 2019-06-20 DIAGNOSIS — I73.9 PAD (PERIPHERAL ARTERY DISEASE): Primary | ICD-10-CM

## 2019-06-24 ENCOUNTER — HOSPITAL ENCOUNTER (OUTPATIENT)
Dept: RADIOLOGY | Facility: HOSPITAL | Age: 79
Discharge: HOME | End: 2019-06-24
Attending: SURGERY
Payer: MEDICARE

## 2019-06-24 ENCOUNTER — TELEPHONE (OUTPATIENT)
Dept: VASCULAR SURGERY | Facility: CLINIC | Age: 79
End: 2019-06-24

## 2019-06-24 ENCOUNTER — HOSPITAL ENCOUNTER (OUTPATIENT)
Dept: CARDIOLOGY | Facility: HOSPITAL | Age: 79
Discharge: HOME | End: 2019-06-24
Attending: SURGERY
Payer: MEDICARE

## 2019-06-24 ENCOUNTER — OFFICE VISIT (OUTPATIENT)
Dept: VASCULAR SURGERY | Facility: CLINIC | Age: 79
End: 2019-06-24
Payer: MEDICARE

## 2019-06-24 VITALS
HEIGHT: 60 IN | SYSTOLIC BLOOD PRESSURE: 142 MMHG | DIASTOLIC BLOOD PRESSURE: 64 MMHG | WEIGHT: 165 LBS | HEART RATE: 94 BPM | BODY MASS INDEX: 32.39 KG/M2

## 2019-06-24 DIAGNOSIS — I73.9 VASCULAR CLAUDICATION: ICD-10-CM

## 2019-06-24 DIAGNOSIS — I74.8 ATHEROEMBOLISM: Primary | ICD-10-CM

## 2019-06-24 DIAGNOSIS — I73.9 PVD (PERIPHERAL VASCULAR DISEASE): ICD-10-CM

## 2019-06-24 PROCEDURE — 93926 CV US DOPPLER ARTERIAL LEG RIGHT (CUPID ONLY): ICD-10-PCS | Mod: 26,RT,, | Performed by: SURGERY

## 2019-06-24 PROCEDURE — 93926 LOWER EXTREMITY STUDY: CPT | Mod: RT

## 2019-06-24 PROCEDURE — 3078F DIAST BP <80 MM HG: CPT | Mod: CPTII,S$GLB,, | Performed by: SURGERY

## 2019-06-24 PROCEDURE — 93926 LOWER EXTREMITY STUDY: CPT | Mod: 26,RT,, | Performed by: SURGERY

## 2019-06-24 PROCEDURE — 99214 OFFICE O/P EST MOD 30 MIN: CPT | Mod: S$GLB,,, | Performed by: SURGERY

## 2019-06-24 PROCEDURE — 99214 PR OFFICE/OUTPT VISIT, EST, LEVL IV, 30-39 MIN: ICD-10-PCS | Mod: S$GLB,,, | Performed by: SURGERY

## 2019-06-24 PROCEDURE — 75635 CTA RUNOFF ABD PEL BILAT LOWER EXT: ICD-10-PCS | Mod: 26,,, | Performed by: RADIOLOGY

## 2019-06-24 PROCEDURE — 93922 UPR/L XTREMITY ART 2 LEVELS: CPT | Mod: 50

## 2019-06-24 PROCEDURE — 75635 CT ANGIO ABDOMINAL ARTERIES: CPT | Mod: TC

## 2019-06-24 PROCEDURE — 3077F PR MOST RECENT SYSTOLIC BLOOD PRESSURE >= 140 MM HG: ICD-10-PCS | Mod: CPTII,S$GLB,, | Performed by: SURGERY

## 2019-06-24 PROCEDURE — 3078F PR MOST RECENT DIASTOLIC BLOOD PRESSURE < 80 MM HG: ICD-10-PCS | Mod: CPTII,S$GLB,, | Performed by: SURGERY

## 2019-06-24 PROCEDURE — 93922 CV US ANKLE BRACHIAL INDICES WO STRESS W TOE TRACINGS (CUPID ONLY): ICD-10-PCS | Mod: 26,,, | Performed by: SURGERY

## 2019-06-24 PROCEDURE — 75635 CT ANGIO ABDOMINAL ARTERIES: CPT | Mod: 26,,, | Performed by: RADIOLOGY

## 2019-06-24 PROCEDURE — 99999 PR PBB SHADOW E&M-EST. PATIENT-LVL III: CPT | Mod: PBBFAC,,, | Performed by: SURGERY

## 2019-06-24 PROCEDURE — 99999 PR PBB SHADOW E&M-EST. PATIENT-LVL III: ICD-10-PCS | Mod: PBBFAC,,, | Performed by: SURGERY

## 2019-06-24 PROCEDURE — 25500020 PHARM REV CODE 255: Performed by: SURGERY

## 2019-06-24 PROCEDURE — 1101F PR PT FALLS ASSESS DOC 0-1 FALLS W/OUT INJ PAST YR: ICD-10-PCS | Mod: CPTII,S$GLB,, | Performed by: SURGERY

## 2019-06-24 PROCEDURE — 1101F PT FALLS ASSESS-DOCD LE1/YR: CPT | Mod: CPTII,S$GLB,, | Performed by: SURGERY

## 2019-06-24 PROCEDURE — 93922 UPR/L XTREMITY ART 2 LEVELS: CPT | Mod: 26,,, | Performed by: SURGERY

## 2019-06-24 PROCEDURE — 3077F SYST BP >= 140 MM HG: CPT | Mod: CPTII,S$GLB,, | Performed by: SURGERY

## 2019-06-24 RX ADMIN — IOHEXOL 125 ML: 350 INJECTION, SOLUTION INTRAVENOUS at 09:06

## 2019-06-24 NOTE — PROGRESS NOTES
Danny Dunbar MD [facility]                         06/24/2019    HPI:  Nelsy Marino is a 78 y.o. female with   Patient Active Problem List   Diagnosis    Lichen sclerosus et atrophicus    PVD (peripheral vascular disease)    Essential hypertension    Type 2 diabetes mellitus    Diastolic CHF, chronic    Obesity (BMI 30-39.9)    History of DVT (deep vein thrombosis)    Wound dehiscence    Fluid collection at surgical site    Cellulitis    Postoperative seroma of musculoskeletal structure after non-musculoskeletal procedure    Abscess of right groin    being managed by PCP and specialists who is here today for evaluation of RLE pain.  She is s/p fem-fem approx 20 yrs ago without issues until recently.  Fem-fem that was performed for RLE ALI was noted to be occluded on US at [facility].  Was scheduled for an angiogram at [facility] although was unable to proceed due to insurance purposes.  Patient states location is R calf and thigh occurring for 2 mo.  Associated signs and symptoms include none.  Quality is throbbing and severity is 9/10 at worst.  Symptoms began 2 mo ago.  Alleviating factors include rest.  Worsening factors include ambulation.  Pt states RLE claudication x 1 block.  States it is lifestyle limiting.     no MI  no Stroke  Tobacco use: quit 35 yrs ago    1/14/19: cont to c/o RLE pain, 1/2 block calf claudication.  Intermittent rest pain, no tissue loss.  Compliant with Pletal, has not noticed significant improvement.    2/14/19: Cont to experience 1/2 block R calf claudication.  No wounds.      4/8/19: s/p R iliofemoral bypass 3/20/19.  C/o R groin inc dehiscence by LUCIUS RN.  No F/C.  States RLE ischemic rest pain resolved, +ambulating better without issues.    4/11/19: Presents for further eval of R abundio inc dehiscence.  No F/C.    4/29/19: No new issues, states took all of PO Abx earlier than rec duration of 2 weeks by ID, no F/C.   States wound healing although has not used soap.  No RLE claudication or rest pain, no wounds to R foot.  States RLE pain resolved.    19:  On PO Abx.  Denies F/C.  Keeping clean with soap and water now.    19:  Doing well, keeping inc clean/dry, no complaints, no F/C.  Remains on Abx per ID recs.    19: No new issues, no F/C.  Remains on Abx.    19:  No new issues or complaints.  No F/C.  R foot paresthesias resolved.    Interval history:  Occasionally paresthesias R foot.  No claudication.  +ambulation.  No F/C.    Past Medical History:   Diagnosis Date    CHF (congestive heart failure)     Diabetes mellitus     DVT (deep venous thrombosis)     Hypertension     Miscarriage      Past Surgical History:   Procedure Laterality Date     femoral vascular surgery       CREATION, BYPASS, ARTERIAL, ILIAC TO FEMORAL, RIGHT LOWER EXTREMITY, RIGHT PROFUNDAPLASTY Right 3/20/2019    Performed by [facility] at [facility]    HYSTERECTOMY      ?unsure cervix present    Incision and Drainage - R groin washout, possible muscle flap Right 2019    Performed by [facility] at [facility]    Incision and Drainage R groin Right 5/3/2019    Performed by Danny Dunbar MD at [facility] OR [address] [place] [address]     Family History   Problem Relation Age of Onset    Diabetes Father     Hypertension Father      Social History     Socioeconomic History    Marital status:      Spouse name: Not on file    Number of children: Not on file    Years of education: Not on file    Highest education level: Not on file   Occupational History    Not on file   Social Needs    Financial resource strain: Not on file    Food insecurity:     Worry: Not on file     Inability: Not on file    Transportation needs:     Medical: Not on file     Non-medical: Not on file   Tobacco Use    Smoking status: Former Smoker     Last attempt to quit: 1980     Years since quittin.5    Smokeless tobacco: Never  "Used   Substance and Sexual Activity    Alcohol use: No    Drug use: No    Sexual activity: Never   Lifestyle    Physical activity:     Days per week: Not on file     Minutes per session: Not on file    Stress: Not on file   Relationships    Social connections:     Talks on phone: Not on file     Gets together: Not on file     Attends Orthodoxy service: Not on file     Active member of club or organization: Not on file     Attends meetings of clubs or organizations: Not on file     Relationship status: Not on file   Other Topics Concern    Not on file   Social History Narrative    Not on file       Current Outpatient Medications:     amlodipine (NORVASC) 10 MG tablet, Take 10 mg by mouth once daily., Disp: , Rfl:     aspirin (ECOTRIN) 81 MG EC tablet, Take 81 mg by mouth once daily., Disp: , Rfl:     BLOOD SUGAR DIAGNOSTIC (TRUE METRIX GLUCOSE TEST STRIP MISC), by Misc.(Non-Drug; Combo Route) route., Disp: , Rfl:     cephALEXin (KEFLEX) 500 MG capsule, Take 1 capsule (500 mg total) by mouth every 8 (eight) hours., Disp: 90 capsule, Rfl: 6    cilostazol (PLETAL) 50 MG Tab, Take 1 tablet (50 mg total) by mouth 2 (two) times daily. If patient tolerate medication after 4 weeks, increase dose to 100 mg twice daily., Disp: 60 tablet, Rfl: 11    glipiZIDE (GLUCOTROL) 10 MG tablet, Take 10 mg by mouth 2 (two) times daily before meals., Disp: , Rfl:     insulin aspart protamine-insulin aspart (NOVOLOG 70/30) 100 unit/mL (70-30) InPn pen, Inject 45 Units into the skin every morning. , Disp: , Rfl:     insulin aspart protamine-insulin aspart (NOVOLOG MIX 70-30FLEXPEN U-100) 100 unit/mL (70-30) InPn pen, Inject 40 Units into the skin every evening., Disp: , Rfl:     insulin syringe-needle,dispos. 0.3 mL 30 gauge x 5/16" Syrg, by Misc.(Non-Drug; Combo Route) route., Disp: , Rfl:     losartan-hydrochlorothiazide 100-25 mg (HYZAAR) 100-25 mg per tablet, Take 1 tablet by mouth once daily., Disp: , Rfl:     " metformin (GLUCOPHAGE) 1000 MG tablet, Take 1,000 mg by mouth 2 (two) times daily with meals., Disp: , Rfl:     ondansetron (ZOFRAN) 8 MG tablet, , Disp: , Rfl: 0    oxyCODONE-acetaminophen (PERCOCET) 5-325 mg per tablet, Take 1 tablet by mouth every 4 (four) hours as needed., Disp: 15 tablet, Rfl: 0  No current facility-administered medications for this visit.     REVIEW OF SYSTEMS:  General: No fevers or chills; ENT: No sore throat; Allergy and Immunology: no persistent infections; Hematological and Lymphatic: No history of bleeding or easy bruising; Endocrine: negative; Respiratory: no cough, shortness of breath, or wheezing; Cardiovascular: no chest pain or dyspnea on exertion; Gastrointestinal: no abdominal pain/back, change in bowel habits, or bloody stools; Genito-Urinary: no dysuria, trouble voiding, or hematuria; Musculoskeletal: negative; Neurological: no TIA or stroke symptoms; Psychiatric: no nervousness, anxiety or depression.    PHYSICAL EXAM:      Pulse: 94         General appearance:  Alert, well-appearing, and in no distress.  Oriented to person, place, and time                    Neurological: Normal speech, no focal findings noted; CN II - XII grossly intact. RLE with sensation to light touch, LLE with sensation to light touch.            Musculoskeletal: Digits/nail without cyanosis/clubbing.  Strength 5/5 BLE.                    Neck: Supple, no significant adenopathy                  Chest:  Clear to auscultation, no wheezes, rales or rhonchi, symmetric air entry. No use of accessory muscles               Cardiac: Normal rate and regular rhythm, S1 and S2 normal            Abdomen: Soft, nontender, nondistended, no masses or organomegaly, no hernia     No rebound tenderness noted; bowel sounds normal     R groin inc healed, no erythema      Extremities:   2+ R femoral pulse, 2+ L femoral pulse     doppler+ R popliteal pulse, doppler+ L popliteal pulse     doppler+ R PT pulse, doppler+ L PT  pulse     2+ R DP pulse, doppler+ L DP pulse     no RLE edema, no LLE edema    Skin: RLE without tissue loss; LLE without tissue loss    LAB RESULTS:  No results found for: CBC  Lab Results   Component Value Date    LABPROT 11.4 05/02/2019    INR 1.1 05/02/2019     Lab Results   Component Value Date     06/24/2019    K 4.7 06/24/2019     06/24/2019    CO2 27 06/24/2019     (H) 06/24/2019    BUN 15 06/24/2019    CREATININE 1.1 06/24/2019    CALCIUM 9.6 06/24/2019    ANIONGAP 7 (L) 06/24/2019    EGFRNONAA 48 (A) 06/24/2019     Lab Results   Component Value Date    WBC 6.48 05/04/2019    RBC 4.18 05/04/2019    HGB 10.2 (L) 05/04/2019    HCT 33.4 (L) 05/04/2019    MCV 80 (L) 05/04/2019    MCH 24.4 (L) 05/04/2019    MCHC 30.5 (L) 05/04/2019    RDW 14.5 05/04/2019     05/04/2019    MPV 10.1 05/04/2019    GRAN 4.3 05/04/2019    GRAN 65.8 05/04/2019    LYMPH 1.5 05/04/2019    LYMPH 22.8 05/04/2019    MONO 0.3 05/04/2019    MONO 4.5 05/04/2019    EOS 0.4 05/04/2019    BASO 0.03 05/04/2019    EOSINOPHIL 6.2 05/04/2019    BASOPHIL 0.5 05/04/2019    DIFFMETHOD Automated 05/04/2019     .  Lab Results   Component Value Date    HGBA1C 9.7 (H) 03/20/2019       IMAGING:  All pertinent imaging has been reviewed and interpreted independently.    11/8/18: US showing occluded fem-fem, decreased velocity R CFA, HD significant stenosis R SFA    [facility] ARCHIE: 0.4/1    1/14/19: ARCHIE R 0.4/1.1, flat L toe waveform, R toe pressure 64 mmHg    2/2019: Stress test without evidence of ischemia.  Echo with normal function.    4/24/19:   ARCHIE   1. Right lower extremity pressures and waveforms indicate mild arterial occlusive disease.  2. Left lower extremity pressures and waveforms indicate no hemodynamically significant arterial occlusive disease.    BLE arterial US  1. Right lower extremity arterial ultrasound and pressures indicate no hemodynamically significant stenosis.  The right iliac artery to femoral artery bypass is  patent without evidence of hemodynamically significant stenosis.    2. Left lower extremity arterial ultrasound shows an occluded left external iliac artery.  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    CTA runoff 4/24/2019:  Patient is postop right iliac to femoral artery bypass.  There is some soft tissue stranding along the iliac graft similar to slightly increased compared to prior.  May be some narrowing at the origin of the graft.  Dilatation of the insertion may be related to surgical technique or pseudoaneurysm.  Correlation with operative procedure will be helpful.  There is decrease in the volume of fluid though a focal 1 cm collection does persist.  There is some increased soft tissue thickening about the insertion site as well as the skin.  Skin staples remain.  Occluded fem-fem bypass graft.  Significant vascular disease throughout the SFA and calf vessels bilaterally similar.  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  Cholelithiasis.    CTA 6/24/19:  Patent right iliac to femoral bypass graft and occluded femoral-femoral bypass graft.  Mild aneurysmal dilatation at the distal anastomosis, unchanged.  Patent lower extremity arterial system with scattered moderate/ high-grade stenosis throughout, as above.    Arterial US 6/24/19:  1. Right lower extremity arterial ultrasound shows a patent iliac to femoral artery bypass with distal anastomosis hemodynamically significant stenosis.  There is mid SFA stenosis.  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  Pressures indicate mild arterial occlusive disease.    ARCHIE 6/24/19:  1. Right lower extremity pressures and waveforms indicate mild arterial occlusive disease.  2. Left lower extremity pressures and waveforms indicate no hemodynamically significant arterial occlusive disease.  Ankle pressures are non-compressible, suggesting possible medial  calcinosis.    IMP/PLAN:  78 y.o. female with   Patient Active Problem List   Diagnosis    Lichen sclerosus et atrophicus    PVD (peripheral vascular disease)    Essential hypertension    Type 2 diabetes mellitus    Diastolic CHF, chronic    Obesity (BMI 30-39.9)    History of DVT (deep vein thrombosis)    Wound dehiscence    Fluid collection at surgical site    Cellulitis    Postoperative seroma of musculoskeletal structure after non-musculoskeletal procedure    Abscess of right groin    being managed by PCP and specialists who is here today for evaluation of RLE claudication, lifestyle limiting s/p R iliofemoral bypass 3/2019 with groin incision dehisence and superficial infection / deep seroma    -Recovering well s/p R iliofemoral bypass s/p wound washout and drainage of seroma with healing skin dehiscence of R groin inc at superior and mid aspect, no graft exposure or fascial dehiscence, 2+ DP with resolution of claudication and ischemic rest pain, now with healing wounds s/p R groin I&D of distal inc superficial skin infection and removal of embedded staple with improvement in wound and no further infection with wound healing well now - cont local wound care keeping clean/dry, cont ID f/u   -Cont ASA and Pletal - will d/w [facility] re: starting statin  -Heart healthy lifestyle  -Will d/w ID re: affordable Abx for suppression - instructed pt to call as pt states she does not have Abx  -Cont smoking cessation  -Plan for CTA runoff and US in 3 mo  -RTC 3 mo    I spent 20 minutes evaluating this patient and greater than 50% of the time was spent counseling, coordinator care and discussing the plan of care.  All questions were answered and patient stated understanding with agreement with the above treatment plan.    Danny Dunbar MD [facility]  Vascular and Endovascular Surgery

## 2019-06-24 NOTE — TELEPHONE ENCOUNTER
Spoke with Ms. Marino and told her to call infection diseases about sending something else in.  ----- Message from Lily Fuller MA sent at 6/24/2019  2:22 PM CDT -----  Contact: Self/ [phone]  Pt is calling in regards to her antibiotics she states that the medication is $900 her insurance will not pay for it. She would like to know if its something else can be called in. She also has questions about the medication

## 2019-06-24 NOTE — PATIENT INSTRUCTIONS
Understanding Peripheral Arterial Disease    Peripheral arteries deliver oxygen-rich blood to the tissues outside the heart. As you age, your arteries become stiffer and thicker. In addition, risk factors, such as smoking and high cholesterol, can damage the artery lining. This allows a buildup of fat and other materials (plaque) to form within the artery walls. The buildup of plaque narrows the space inside the artery and sometimes blocks blood flow. Peripheral arterial disease (PAD) happens when blood flow through the arteries is reduced because of plaque buildup. It often happens in the legs and feet, but can also happen elsewhere in the body. If this buildup happens in the a large artery in the neck (carotid artery), it can be a major contributor to stroke.  A healthy artery  An artery is a muscular tube that carries oxgen rich blood and nutrients from the heart to the rest of the body. It has a smooth lining and flexible walls that allow blood to pass freely. When active, muscles need more oxygen. This increases blood flow. Healthy arteries can adapt to meet this need.  A damaged artery    PAD begins when the lining of an artery is damaged. This is often because of risk factors, such as smoking, older age, or diabetes. Plaque then starts to form within the artery wall. At this stage, blood flows normally, so youre not likely to have symptoms.  A narrowed artery    If plaque continues to build up, the space inside the artery narrows. The artery walls become less able to expand. The artery still provides enough blood and oxygen to your muscles during rest. But when youre active, the increased demand for blood cant be met. As a result, your leg may cramp or ache when you walk.  A blocked artery    An artery can become blocked by plaque or by a blood clot lodged in a narrowed section. When this happens, oxygen cant reach the muscle below the blockage. Then you may feel pain when lying down or when you are not  active (rest pain). This type of pain is especially common at night when youre lying flat. In time, the affected tissue can die. This can lead to the loss of a toe or foot.  Date Last Reviewed: 5/1/2016 © 2000-2017 [employer]. [address] [place] [address]. All rights reserved. This information is not intended as a substitute for professional medical care. Always follow your healthcare professional's instructions.        Peripheral Artery Disease (PAD)     Peripheral artery disease (PAD) occurs when the arteries that carry blood to the limbs are narrowed or blocked. This is usually due to a buildup of a fatty substance called plaque in the walls of the arteries.  PAD most often affects the arteries in the legs. When these arteries are narrowed or blocked, blood flow to the legs is reduced. This can cause leg and foot pain and other symptoms. If severe enough, reduced blood flow to the legs can lead to tissue death (gangrene) and the loss of a toe, foot, or leg. Having PAD also makes it more likely that arteries in other body areas are blocked. For instance, arteries that carry blood to the heart or brain may be affected. This raises the chances of heart attack and stroke.  Risk factors  Certain factors can make PAD more likely. They include:  · Smoking  · Diabetes  · High blood pressure  · Unhealthy cholesterol levels  · Obesity  · Inactive lifestyle  · Older age  · Family history of PAD  Symptoms  Many people with PAD have no symptoms. If symptoms do occur, they can include:  · Pain in the muscles of the calves, thighs or hips that gets worse with activity and better with rest (intermittent claudication)  · Achy, tired, or heavy feeling in the legs  · Weakness, numbness, tingling, or loss of feeling in the legs  · Changes in skin color of the legs  · Sores on the legs and feet  · Cold leg, feet, or toes  · Pain the feet or toes even when lying down (rest pain)  Home care  PAD is a  chronic (lifelong) condition. Treatment is focused on managing your condition and lowering your health risks. This may include doing the following:  · If you smoke, quit. This helps prevent further damage to your arteries and lowers your health risks. Ask your provider about medicines or products that can help you quit smoking. Also consider joining a stop-smoking program or support group.  · Be more active. This helps you lose weight and manage problems such as high blood pressure and unhealthy cholesterol levels. Start a walking program if advised to by your provider. Your provider may also help you form a safe exercise program that is right for your needs.  · Make healthy eating changes. This includes eating less fat, salt, and sugar.  · Take medicines for high blood pressure, unhealthy cholesterol levels, and diabetes as directed.  · Have your blood pressure and cholesterol levels checked as often as directed.  · If you have diabetes, try to keep your blood sugar well controlled. Test your blood sugar as directed.  · If you are overweight, talk to your provider about forming a weight-loss plan.  · Watch for cuts, scrapes, or open sores on your feet. Poor blood flow to the feet may slow healing and increase the risk of infection from these problems.   Follow-up care  Follow up with your healthcare provider, or as advised. If imaging tests such as ultrasound were done, they will be reviewed by a doctor. You will be told the results and any new findings that may affect your care.  When to seek medical advice   Call your healthcare provider right away if any of these occur:  · Sudden severe pain in the legs or feet  · Sudden cold, pale or blue color in the legs or feet  · Weakness or numbness in the legs or feet that worsens  · Any sore or wound in the legs or feet that wont heal  · Weak pulse in your legs or feet  Know the Signs of Heart Attack and Stroke  People with PAD are at high risk for heart attack and  stroke. Knowing the signs of these problems can help you protect your health and get help when you need it. Call 911 right away if you have any of the following:  Signs of a Heart Attack  · Chest discomfort, such as pain, aching, tightness, or pressure that lasts more than a few minutes, or that comes and goes  · Pain or discomfort in the arms, back, shoulders, neck, or jaw  · Shortness of breath  · Sweating (often a cold, clammy sweat)  · Nausea  · Lightheadedness  Signs of a Stroke  · Sudden numbness or weakness of the face, arms, or legs, especially on one side  · Sudden confusion or trouble speaking or understanding  · Sudden trouble seeing in one or both eyes  · Sudden trouble walking, dizziness, or loss of balance  · Sudden, severe headache with no known cause   Date Last Reviewed: 9/21/2015  © 7564-0169 [employer]. [address] [place] [address]. All rights reserved. This information is not intended as a substitute for professional medical care. Always follow your healthcare professional's instructions.        A Walking Program for Peripheral Arterial Disease (PAD)  Peripheral arterial disease (PAD) is a condition where the arteries in the legs are severely damaged. When you have PAD, walking can be painful. So you may start to walk less. Walking less makes your leg muscles weaker. It then becomes harder and more painful to walk. A walking program can break this cycle. This sheet gives you tips on how to get started.     Walking farther without pain  Exercise strengthens leg muscles that are out of shape. It also trains these muscles to work with less oxygen. This helps your leg muscles work better even with reduced blood flow to your legs. When you have PAD, a walking program can be helpful. Your program can:  · Let you walk longer and farther without claudication. This is an ache in your legs during exercise that goes away with rest.  · Let you do more and be more active  · Add to  your overall health and well-being  · Help you control your blood sugar and blood pressure  · Help you become healthier with no risk and at little or no cost  Getting started  Your local hospital, vascular center, or cardiac rehab center may have a special walking program for people with PAD. If so, this is your best option. But if you cant find a program, or its not covered by insurance, you can still walk on your own. Follow these steps at each session:  · Step 1. Start at a pace that lets you walk for 5 to 10 minutes before you start to feel claudication. This feeling is unpleasant, but it doesnt hurt you. Keep going until the pain makes you feel that you need to stop.  · Step 2. Stop and rest for 3 to 5 minutes, just long enough for the pain to go away. You can rest standing or sitting. (Some people like to bring along a cane or a lightweight folding chair.)  · Step 3. Again walk at a pace that lets you walk for 5 to 10 minutes more before you feel pain. This may be slower than your starting pace in step 1. Then rest again.  · Step 4. Repeat this process until youve walked for 45 minutes. This should be about 60 to 80 minutes total, including resting time. You may not be able to do a full 45 minutes at first. Do as much as you can, and increase your time as you improve.  Making the most of your program  · Walk at least 3 times a week. Take no more than 2 days off between sessions. If you stop walking, even for a week or two, you can lose the health benefits of your program.  · Find a good place to walk. A treadmill or a track may be better at first. That way, you wont run the risk of going too far and finding that you cant walk back. Be sure to have a place to walk indoors in bad weather, such as a gym or a mall.  · Wear shoes with sturdy, flexible soles. The heel should fit without slipping. You should have room to wiggle your toes.  · Keep track of how long you walk. A pedometer will show your daily  progress. It can also show how much farther you can walk as time goes by.  · Ask a friend to keep you company. You may enjoy walking with someone else. Or you may want to make your walking sessions a time to relax by yourself.  · Make it fun. Listen to music while you walk and rest. Walk in a favorite park. Get to know the people at the gym, or the folks that you pass on your route. While you rest, you can window-shop, read, or feed the birds. Do whatever will help you enjoy your exercise sessions.  Date Last Reviewed: 6/1/2016  © 4587-1333 [employer]. [address] [place] [address], [place] [address]. All rights reserved. This information is not intended as a substitute for professional medical care. Always follow your healthcare professional's instructions.

## 2019-06-24 NOTE — LETTER
June 24, 2019        [place] [address] [place] [facility] [address]             [facility] - Vascular Surgery  [address]  [facility] [address]  Phone: [phone]  Fax: [phone]   Patient: Nelsy Marino   MR Number: [id number]   YOB: 1940   Date of Visit: 6/24/2019       Dear Dr. Garay:    Thank you for referring Nelsy Marino to me for evaluation. Below are the relevant portions of my assessment and plan of care.            If you have questions, please do not hesitate to call me. I look forward to following Nelsy along with you.    Sincerely,      Danny Dunbar MD           CC  No Recipients

## 2019-06-28 LAB
LEFT ABI: 1.59
LEFT ARM BP: 160 MMHG
LEFT DORSALIS PEDIS: 255 MMHG
LEFT POSTERIOR TIBIAL: 255 MMHG
LEFT TBI: 0.64
LEFT TOE PRESSURE: 103 MMHG
Lab: 86 CM/S
OHS CV RIGHT ABI LOWER EXTREMITY (NO CALC): 0.83
RIGHT ABI: 0.83
RIGHT ANT TIBIAL SYS PSV: 46 CM/S
RIGHT ARM BP: 160 MMHG
RIGHT DIST ANA PSV: 404 CM/S
RIGHT DIST GRAFT PSV: 86 CM/S
RIGHT DORSALIS PEDIS: 133 MMHG
RIGHT MID GRAFT PSV: 83 CM/S
RIGHT OUTFLOW PSV: 128 CM/S
RIGHT PERONEAL SYS PSV: 34 CM/S
RIGHT POPLITEAL PSV: 50 CM/S
RIGHT POST TIBIAL SYS PSV: 42 CM/S
RIGHT POSTERIOR TIBIAL: 92 MMHG
RIGHT PROX ANA PSV: 80 CM/S
RIGHT PROX GRAFT PSV: 98 CM/S
RIGHT SUPER FEMORAL DIST SYS PSV: 67 CM/S
RIGHT SUPER FEMORAL MID SYS PSV: 610 CM/S
RIGHT SUPER FEMORAL OSTIAL SYS PSV: 164 CM/S
RIGHT SUPER FEMORAL PROX SYS PSV: 76 CM/S
RIGHT TBI: 0.45
RIGHT TIB/PER TRUNK SYS PSV: 44 CM/S
RIGHT TOE PRESSURE: 72 MMHG

## 2019-07-05 LAB
ACID FAST MOD KINY STN SPEC: NORMAL
ACID FAST MOD KINY STN SPEC: NORMAL
MYCOBACTERIUM SPEC QL CULT: NORMAL
MYCOBACTERIUM SPEC QL CULT: NORMAL

## 2019-07-15 DIAGNOSIS — B37.31 VAGINAL YEAST INFECTION: Primary | ICD-10-CM

## 2019-07-15 DIAGNOSIS — L08.9 WOUND INFECTION: Primary | ICD-10-CM

## 2019-07-15 DIAGNOSIS — T14.8XXA WOUND INFECTION: Primary | ICD-10-CM

## 2019-07-15 RX ORDER — CEPHALEXIN 500 MG/1
500 TABLET ORAL 4 TIMES DAILY
Qty: 120 TABLET | Refills: 6 | Status: SHIPPED | OUTPATIENT
Start: 2019-07-15 | End: 2019-08-14

## 2019-07-15 RX ORDER — FLUCONAZOLE 200 MG/1
200 TABLET ORAL DAILY
Qty: 7 TABLET | Refills: 3 | Status: SHIPPED | OUTPATIENT
Start: 2019-07-15 | End: 2019-07-22

## 2019-08-28 RX ORDER — FLUCONAZOLE 150 MG/1
150 TABLET ORAL DAILY
Qty: 1 TABLET | Refills: 0 | Status: SHIPPED | OUTPATIENT
Start: 2019-08-28 | End: 2019-08-29

## 2019-08-28 NOTE — TELEPHONE ENCOUNTER
----- Message from Lily Fuller MA sent at 8/28/2019 12:50 PM CDT -----  Contact: self/[phone]  Pt states she would like a call from the nurse, Pt states she need a refill on her medication but she forgot what  The  Name of the medication was. Pt states it was for  The Yeast infection.

## 2019-10-16 DIAGNOSIS — I73.9 PAD (PERIPHERAL ARTERY DISEASE): Primary | ICD-10-CM

## 2019-10-18 ENCOUNTER — HOSPITAL ENCOUNTER (OUTPATIENT)
Dept: CARDIOLOGY | Facility: HOSPITAL | Age: 79
Discharge: HOME | End: 2019-10-18
Attending: SURGERY
Payer: MEDICARE

## 2019-10-18 DIAGNOSIS — I74.8 ATHEROEMBOLISM: ICD-10-CM

## 2019-10-18 DIAGNOSIS — I73.9 PVD (PERIPHERAL VASCULAR DISEASE): ICD-10-CM

## 2019-10-18 LAB
LEFT ABI: 2.07
LEFT ARM BP: 120 MMHG
LEFT DORSALIS PEDIS: 108 MMHG
LEFT POSTERIOR TIBIAL: 255 MMHG
LEFT TBI: 0.46
LEFT TOE PRESSURE: 57 MMHG
OHS CV LOWER EXTREMITY GRAFT MEASUREMENTS - RIGHT - G1 - D: 99
OHS CV LOWER EXTREMITY GRAFT MEASUREMENTS - RIGHT - G1 - DA: 205
OHS CV LOWER EXTREMITY GRAFT MEASUREMENTS - RIGHT - G1 - M: 126
OHS CV LOWER EXTREMITY GRAFT MEASUREMENTS - RIGHT - G1 - P: 112
OHS CV LOWER EXTREMITY GRAFT MEASUREMENTS - RIGHT - G1 - PA: 91
OHS CV RIGHT ABI LOWER EXTREMITY (NO CALC): 0.71
OHS CV US RIGHT COMMON ILIAC PSV: 0 CM/S
RIGHT ABI: 0.71
RIGHT ANT TIBIAL SYS PSV: 41 CM/S
RIGHT ARM BP: 123 MMHG
RIGHT DORSALIS PEDIS: 87 MMHG
RIGHT EXTERNAL ILLIAC PSV: 0 CM/S
RIGHT PERONEAL SYS PSV: 21 CM/S
RIGHT POPLITEAL PSV: 43 CM/S
RIGHT POST TIBIAL SYS PSV: 34 CM/S
RIGHT POSTERIOR TIBIAL: 78 MMHG
RIGHT PROFUNDA SYS PSV: 104 CM/S
RIGHT SUPER FEMORAL DIST SYS PSV: 47 CM/S
RIGHT SUPER FEMORAL MID SYS PSV: 581 CM/S
RIGHT SUPER FEMORAL OSTIAL SYS PSV: 63 CM/S
RIGHT SUPER FEMORAL PROX SYS PSV: 92 CM/S
RIGHT TBI: 0.35
RIGHT TIB/PER TRUNK SYS PSV: 47 CM/S
RIGHT TOE PRESSURE: 43 MMHG

## 2019-10-18 PROCEDURE — 93922 CV US ANKLE BRACHIAL INDICES WO STRESS W TOE TRACINGS (CUPID ONLY): ICD-10-PCS | Mod: 26,,, | Performed by: SURGERY

## 2019-10-18 PROCEDURE — 93922 UPR/L XTREMITY ART 2 LEVELS: CPT | Mod: 26,,, | Performed by: SURGERY

## 2019-10-18 PROCEDURE — 93926 LOWER EXTREMITY STUDY: CPT | Mod: 26,RT,, | Performed by: SURGERY

## 2019-10-18 PROCEDURE — 93922 UPR/L XTREMITY ART 2 LEVELS: CPT | Mod: 50

## 2019-10-18 PROCEDURE — 93926 LOWER EXTREMITY STUDY: CPT | Mod: RT

## 2019-10-18 PROCEDURE — 93926 CV US DOPPLER ARTERIAL LEG RIGHT (CUPID ONLY): ICD-10-PCS | Mod: 26,RT,, | Performed by: SURGERY

## 2019-10-21 ENCOUNTER — OFFICE VISIT (OUTPATIENT)
Dept: VASCULAR SURGERY | Facility: CLINIC | Age: 79
End: 2019-10-21
Payer: MEDICARE

## 2019-10-21 VITALS
DIASTOLIC BLOOD PRESSURE: 70 MMHG | HEIGHT: 60 IN | HEART RATE: 74 BPM | SYSTOLIC BLOOD PRESSURE: 150 MMHG | BODY MASS INDEX: 33.89 KG/M2 | WEIGHT: 172.63 LBS

## 2019-10-21 DIAGNOSIS — I73.9 ASYMPTOMATIC PVD (PERIPHERAL VASCULAR DISEASE): Primary | ICD-10-CM

## 2019-10-21 PROCEDURE — 99214 OFFICE O/P EST MOD 30 MIN: CPT | Mod: S$GLB,,, | Performed by: SURGERY

## 2019-10-21 PROCEDURE — 3077F SYST BP >= 140 MM HG: CPT | Mod: CPTII,S$GLB,, | Performed by: SURGERY

## 2019-10-21 PROCEDURE — 3078F DIAST BP <80 MM HG: CPT | Mod: CPTII,S$GLB,, | Performed by: SURGERY

## 2019-10-21 PROCEDURE — 99999 PR PBB SHADOW E&M-EST. PATIENT-LVL III: ICD-10-PCS | Mod: PBBFAC,,, | Performed by: SURGERY

## 2019-10-21 PROCEDURE — 3078F PR MOST RECENT DIASTOLIC BLOOD PRESSURE < 80 MM HG: ICD-10-PCS | Mod: CPTII,S$GLB,, | Performed by: SURGERY

## 2019-10-21 PROCEDURE — 99999 PR PBB SHADOW E&M-EST. PATIENT-LVL III: CPT | Mod: PBBFAC,,, | Performed by: SURGERY

## 2019-10-21 PROCEDURE — 1101F PT FALLS ASSESS-DOCD LE1/YR: CPT | Mod: CPTII,S$GLB,, | Performed by: SURGERY

## 2019-10-21 PROCEDURE — 3077F PR MOST RECENT SYSTOLIC BLOOD PRESSURE >= 140 MM HG: ICD-10-PCS | Mod: CPTII,S$GLB,, | Performed by: SURGERY

## 2019-10-21 PROCEDURE — 99214 PR OFFICE/OUTPT VISIT, EST, LEVL IV, 30-39 MIN: ICD-10-PCS | Mod: S$GLB,,, | Performed by: SURGERY

## 2019-10-21 PROCEDURE — 1101F PR PT FALLS ASSESS DOC 0-1 FALLS W/OUT INJ PAST YR: ICD-10-PCS | Mod: CPTII,S$GLB,, | Performed by: SURGERY

## 2019-10-21 RX ORDER — CEPHALEXIN 500 MG/1
CAPSULE ORAL
Refills: 6 | Status: ON HOLD | COMMUNITY
Start: 2019-07-15 | End: 2023-02-23 | Stop reason: CLARIF

## 2019-10-21 NOTE — PATIENT INSTRUCTIONS
Understanding Peripheral Arterial Disease    Peripheral arteries deliver oxygen-rich blood to the tissues outside the heart. As you age, your arteries become stiffer and thicker. In addition, risk factors, such as smoking and high cholesterol, can damage the artery lining. This allows a buildup of fat and other materials (plaque) to form within the artery walls. The buildup of plaque narrows the space inside the artery and sometimes blocks blood flow. Peripheral arterial disease (PAD) happens when blood flow through the arteries is reduced because of plaque buildup. It often happens in the legs and feet, but can also happen elsewhere in the body. If this buildup happens in the a large artery in the neck (carotid artery), it can be a major contributor to stroke.  A healthy artery  An artery is a muscular tube that carries oxgen rich blood and nutrients from the heart to the rest of the body. It has a smooth lining and flexible walls that allow blood to pass freely. When active, muscles need more oxygen. This increases blood flow. Healthy arteries can adapt to meet this need.  A damaged artery    PAD begins when the lining of an artery is damaged. This is often because of risk factors, such as smoking, older age, or diabetes. Plaque then starts to form within the artery wall. At this stage, blood flows normally, so youre not likely to have symptoms.  A narrowed artery    If plaque continues to build up, the space inside the artery narrows. The artery walls become less able to expand. The artery still provides enough blood and oxygen to your muscles during rest. But when youre active, the increased demand for blood cant be met. As a result, your leg may cramp or ache when you walk.  A blocked artery    An artery can become blocked by plaque or by a blood clot lodged in a narrowed section. When this happens, oxygen cant reach the muscle below the blockage. Then you may feel pain when lying down or when you are not  active (rest pain). This type of pain is especially common at night when youre lying flat. In time, the affected tissue can die. This can lead to the loss of a toe or foot.  Date Last Reviewed: 5/1/2016 © 2000-2017 [employer]. [address] [place] [address]. All rights reserved. This information is not intended as a substitute for professional medical care. Always follow your healthcare professional's instructions.        Peripheral Artery Disease (PAD)     Peripheral artery disease (PAD) occurs when the arteries that carry blood to the limbs are narrowed or blocked. This is usually due to a buildup of a fatty substance called plaque in the walls of the arteries.  PAD most often affects the arteries in the legs. When these arteries are narrowed or blocked, blood flow to the legs is reduced. This can cause leg and foot pain and other symptoms. If severe enough, reduced blood flow to the legs can lead to tissue death (gangrene) and the loss of a toe, foot, or leg. Having PAD also makes it more likely that arteries in other body areas are blocked. For instance, arteries that carry blood to the heart or brain may be affected. This raises the chances of heart attack and stroke.  Risk factors  Certain factors can make PAD more likely. They include:  · Smoking  · Diabetes  · High blood pressure  · Unhealthy cholesterol levels  · Obesity  · Inactive lifestyle  · Older age  · Family history of PAD  Symptoms  Many people with PAD have no symptoms. If symptoms do occur, they can include:  · Pain in the muscles of the calves, thighs or hips that gets worse with activity and better with rest (intermittent claudication)  · Achy, tired, or heavy feeling in the legs  · Weakness, numbness, tingling, or loss of feeling in the legs  · Changes in skin color of the legs  · Sores on the legs and feet  · Cold leg, feet, or toes  · Pain the feet or toes even when lying down (rest pain)  Home care  PAD is a  chronic (lifelong) condition. Treatment is focused on managing your condition and lowering your health risks. This may include doing the following:  · If you smoke, quit. This helps prevent further damage to your arteries and lowers your health risks. Ask your provider about medicines or products that can help you quit smoking. Also consider joining a stop-smoking program or support group.  · Be more active. This helps you lose weight and manage problems such as high blood pressure and unhealthy cholesterol levels. Start a walking program if advised to by your provider. Your provider may also help you form a safe exercise program that is right for your needs.  · Make healthy eating changes. This includes eating less fat, salt, and sugar.  · Take medicines for high blood pressure, unhealthy cholesterol levels, and diabetes as directed.  · Have your blood pressure and cholesterol levels checked as often as directed.  · If you have diabetes, try to keep your blood sugar well controlled. Test your blood sugar as directed.  · If you are overweight, talk to your provider about forming a weight-loss plan.  · Watch for cuts, scrapes, or open sores on your feet. Poor blood flow to the feet may slow healing and increase the risk of infection from these problems.   Follow-up care  Follow up with your healthcare provider, or as advised. If imaging tests such as ultrasound were done, they will be reviewed by a doctor. You will be told the results and any new findings that may affect your care.  When to seek medical advice   Call your healthcare provider right away if any of these occur:  · Sudden severe pain in the legs or feet  · Sudden cold, pale or blue color in the legs or feet  · Weakness or numbness in the legs or feet that worsens  · Any sore or wound in the legs or feet that wont heal  · Weak pulse in your legs or feet  Know the Signs of Heart Attack and Stroke  People with PAD are at high risk for heart attack and  stroke. Knowing the signs of these problems can help you protect your health and get help when you need it. Call 911 right away if you have any of the following:  Signs of a Heart Attack  · Chest discomfort, such as pain, aching, tightness, or pressure that lasts more than a few minutes, or that comes and goes  · Pain or discomfort in the arms, back, shoulders, neck, or jaw  · Shortness of breath  · Sweating (often a cold, clammy sweat)  · Nausea  · Lightheadedness  Signs of a Stroke  · Sudden numbness or weakness of the face, arms, or legs, especially on one side  · Sudden confusion or trouble speaking or understanding  · Sudden trouble seeing in one or both eyes  · Sudden trouble walking, dizziness, or loss of balance  · Sudden, severe headache with no known cause   Date Last Reviewed: 9/21/2015  © 2701-6951 [employer]. [address] [place] [address]. All rights reserved. This information is not intended as a substitute for professional medical care. Always follow your healthcare professional's instructions.        A Walking Program for Peripheral Arterial Disease (PAD)  Peripheral arterial disease (PAD) is a condition where the arteries in the legs are severely damaged. When you have PAD, walking can be painful. So you may start to walk less. Walking less makes your leg muscles weaker. It then becomes harder and more painful to walk. A walking program can break this cycle. This sheet gives you tips on how to get started.     Walking farther without pain  Exercise strengthens leg muscles that are out of shape. It also trains these muscles to work with less oxygen. This helps your leg muscles work better even with reduced blood flow to your legs. When you have PAD, a walking program can be helpful. Your program can:  · Let you walk longer and farther without claudication. This is an ache in your legs during exercise that goes away with rest.  · Let you do more and be more active  · Add to  your overall health and well-being  · Help you control your blood sugar and blood pressure  · Help you become healthier with no risk and at little or no cost  Getting started  Your local hospital, vascular center, or cardiac rehab center may have a special walking program for people with PAD. If so, this is your best option. But if you cant find a program, or its not covered by insurance, you can still walk on your own. Follow these steps at each session:  · Step 1. Start at a pace that lets you walk for 5 to 10 minutes before you start to feel claudication. This feeling is unpleasant, but it doesnt hurt you. Keep going until the pain makes you feel that you need to stop.  · Step 2. Stop and rest for 3 to 5 minutes, just long enough for the pain to go away. You can rest standing or sitting. (Some people like to bring along a cane or a lightweight folding chair.)  · Step 3. Again walk at a pace that lets you walk for 5 to 10 minutes more before you feel pain. This may be slower than your starting pace in step 1. Then rest again.  · Step 4. Repeat this process until youve walked for 45 minutes. This should be about 60 to 80 minutes total, including resting time. You may not be able to do a full 45 minutes at first. Do as much as you can, and increase your time as you improve.  Making the most of your program  · Walk at least 3 times a week. Take no more than 2 days off between sessions. If you stop walking, even for a week or two, you can lose the health benefits of your program.  · Find a good place to walk. A treadmill or a track may be better at first. That way, you wont run the risk of going too far and finding that you cant walk back. Be sure to have a place to walk indoors in bad weather, such as a gym or a mall.  · Wear shoes with sturdy, flexible soles. The heel should fit without slipping. You should have room to wiggle your toes.  · Keep track of how long you walk. A pedometer will show your daily  progress. It can also show how much farther you can walk as time goes by.  · Ask a friend to keep you company. You may enjoy walking with someone else. Or you may want to make your walking sessions a time to relax by yourself.  · Make it fun. Listen to music while you walk and rest. Walk in a favorite park. Get to know the people at the gym, or the folks that you pass on your route. While you rest, you can window-shop, read, or feed the birds. Do whatever will help you enjoy your exercise sessions.  Date Last Reviewed: 6/1/2016  © 9689-7177 [employer]. [address] [place] [address], [place] [address]. All rights reserved. This information is not intended as a substitute for professional medical care. Always follow your healthcare professional's instructions.

## 2019-10-21 NOTE — PROGRESS NOTES
Danny Dunbar MD [facility]                         10/21/2019    HPI:  Nelsy Marino is a 79 y.o. female with   Patient Active Problem List   Diagnosis    Lichen sclerosus et atrophicus    PVD (peripheral vascular disease)    Essential hypertension    Type 2 diabetes mellitus    Diastolic CHF, chronic    Obesity (BMI 30-39.9)    History of DVT (deep vein thrombosis)    Wound dehiscence    Fluid collection at surgical site    Cellulitis    Postoperative seroma of musculoskeletal structure after non-musculoskeletal procedure    Abscess of right groin    being managed by PCP and specialists who is here today for evaluation of RLE pain.  She is s/p fem-fem approx 20 yrs ago without issues until recently.  Fem-fem that was performed for RLE ALI was noted to be occluded on US at [facility].  Was scheduled for an angiogram at [facility] although was unable to proceed due to insurance purposes.  Patient states location is R calf and thigh occurring for 2 mo.  Associated signs and symptoms include none.  Quality is throbbing and severity is 9/10 at worst.  Symptoms began 2 mo ago.  Alleviating factors include rest.  Worsening factors include ambulation.  Pt states RLE claudication x 1 block.  States it is lifestyle limiting.     no MI  no Stroke  Tobacco use: quit 35 yrs ago    1/14/19: cont to c/o RLE pain, 1/2 block calf claudication.  Intermittent rest pain, no tissue loss.  Compliant with Pletal, has not noticed significant improvement.    2/14/19: Cont to experience 1/2 block R calf claudication.  No wounds.      4/8/19: s/p R iliofemoral bypass 3/20/19.  C/o R groin inc dehiscence by LUCIUS RN.  No F/C.  States RLE ischemic rest pain resolved, +ambulating better without issues.    4/11/19: Presents for further eval of R abundio inc dehiscence.  No F/C.    4/29/19: No new issues, states took all of PO Abx earlier than rec duration of 2 weeks by ID, no F/C.   States wound healing although has not used soap.  No RLE claudication or rest pain, no wounds to R foot.  States RLE pain resolved.    5/9/19:  On PO Abx.  Denies F/C.  Keeping clean with soap and water now.    5/16/19:  Doing well, keeping inc clean/dry, no complaints, no F/C.  Remains on Abx per ID recs.    5/23/19: No new issues, no F/C.  Remains on Abx.    6/6/19:  No new issues or complaints.  No F/C.  R foot paresthesias resolved.    7/2019:  Occasionally paresthesias R foot.  No claudication.  +ambulation.  No F/C.    Interval history:  No complaints today.  No signs of infection, no claudication.  Ambulating well.  Has resumed daily activities.    Past Medical History:   Diagnosis Date    CHF (congestive heart failure)     Diabetes mellitus     DVT (deep venous thrombosis)     Hypertension     Miscarriage      Past Surgical History:   Procedure Laterality Date     femoral vascular surgery       CREATION OF ILIOFEMORAL ARTERY BYPASS Right 3/20/2019    Procedure: CREATION, BYPASS, ARTERIAL, ILIAC TO FEMORAL, RIGHT LOWER EXTREMITY, RIGHT PROFUNDAPLASTY;  Surgeon: Danny Dunbar MD;  Location: [facility];  Service: Vascular;  Laterality: Right;  11:00AM START PER ANNE RICHARDS PREOP 3/13/2019  ACT'S, CELL SAVER, GRAFTS, BOOK WATER---NEED H/P  CONSENT IN AM------HGBA1C    HYSTERECTOMY      ?unsure cervix present    INCISION AND DRAINAGE Right 4/12/2019    Procedure: Incision and Drainage - R groin washout, possible muscle flap;  Surgeon: Danny Dunbar MD;  Location: [facility] OR [address] [place] [address];  Service: Vascular;  Laterality: Right;  groin washout    INCISION AND DRAINAGE OF GROIN Right 5/3/2019    Procedure: Incision and Drainage R groin;  Surgeon: Danny Dunbar MD;  Location: [facility] OR [address] [place] [address];  Service: Vascular;  Laterality: Right;     Family History   Problem Relation Age of Onset    Diabetes Father     Hypertension Father      Social History     Socioeconomic History    Marital status:       Spouse name: Not on file    Number of children: Not on file    Years of education: Not on file    Highest education level: Not on file   Occupational History    Not on file   Social Needs    Financial resource strain: Not on file    Food insecurity:     Worry: Not on file     Inability: Not on file    Transportation needs:     Medical: Not on file     Non-medical: Not on file   Tobacco Use    Smoking status: Former Smoker     Last attempt to quit: 1980     Years since quittin.8    Smokeless tobacco: Never Used   Substance and Sexual Activity    Alcohol use: No    Drug use: No    Sexual activity: Never   Lifestyle    Physical activity:     Days per week: Not on file     Minutes per session: Not on file    Stress: Not on file   Relationships    Social connections:     Talks on phone: Not on file     Gets together: Not on file     Attends Confucianist service: Not on file     Active member of club or organization: Not on file     Attends meetings of clubs or organizations: Not on file     Relationship status: Not on file   Other Topics Concern    Not on file   Social History Narrative    Not on file       Current Outpatient Medications:     amlodipine (NORVASC) 10 MG tablet, Take 10 mg by mouth once daily., Disp: , Rfl:     aspirin (ECOTRIN) 81 MG EC tablet, Take 81 mg by mouth once daily., Disp: , Rfl:     BLOOD SUGAR DIAGNOSTIC (TRUE METRIX GLUCOSE TEST STRIP MISC), by Misc.(Non-Drug; Combo Route) route., Disp: , Rfl:     cephALEXin (KEFLEX) 500 MG capsule, TAKE 1 CAPSULE BY MOUTH 4 TIMES DAILY, Disp: , Rfl: 6    cilostazol (PLETAL) 50 MG Tab, Take 1 tablet (50 mg total) by mouth 2 (two) times daily. If patient tolerate medication after 4 weeks, increase dose to 100 mg twice daily., Disp: 60 tablet, Rfl: 11    glipiZIDE (GLUCOTROL) 10 MG tablet, Take 10 mg by mouth 2 (two) times daily before meals., Disp: , Rfl:     insulin aspart protamine-insulin aspart (NOVOLOG 70/30) 100  "unit/mL (70-30) InPn pen, Inject 45 Units into the skin every morning. , Disp: , Rfl:     insulin aspart protamine-insulin aspart (NOVOLOG MIX 70-30FLEXPEN U-100) 100 unit/mL (70-30) InPn pen, Inject 40 Units into the skin every evening., Disp: , Rfl:     insulin syringe-needle,dispos. 0.3 mL 30 gauge x 5/16" Syrg, by Misc.(Non-Drug; Combo Route) route., Disp: , Rfl:     losartan-hydrochlorothiazide 100-25 mg (HYZAAR) 100-25 mg per tablet, Take 1 tablet by mouth once daily., Disp: , Rfl:     metformin (GLUCOPHAGE) 1000 MG tablet, Take 1,000 mg by mouth 2 (two) times daily with meals., Disp: , Rfl:     ondansetron (ZOFRAN) 8 MG tablet, , Disp: , Rfl: 0    oxyCODONE-acetaminophen (PERCOCET) 5-325 mg per tablet, Take 1 tablet by mouth every 4 (four) hours as needed., Disp: 15 tablet, Rfl: 0    REVIEW OF SYSTEMS:  General: No fevers or chills; ENT: No sore throat; Allergy and Immunology: no persistent infections; Hematological and Lymphatic: No history of bleeding or easy bruising; Endocrine: negative; Respiratory: no cough, shortness of breath, or wheezing; Cardiovascular: no chest pain or dyspnea on exertion; Gastrointestinal: no abdominal pain/back, change in bowel habits, or bloody stools; Genito-Urinary: no dysuria, trouble voiding, or hematuria; Musculoskeletal: negative; Neurological: no TIA or stroke symptoms; Psychiatric: no nervousness, anxiety or depression.    PHYSICAL EXAM:      Pulse: 74         General appearance:  Alert, well-appearing, and in no distress.  Oriented to person, place, and time                    Neurological: Normal speech, no focal findings noted; CN II - XII grossly intact. RLE with sensation to light touch, LLE with sensation to light touch.            Musculoskeletal: Digits/nail without cyanosis/clubbing.  Strength 5/5 BLE.                    Neck: Supple, no significant adenopathy                  Chest:  Clear to auscultation, no wheezes, rales or rhonchi, symmetric air " entry. No use of accessory muscles               Cardiac: Normal rate and regular rhythm, S1 and S2 normal            Abdomen: Soft, nontender, nondistended, no masses or organomegaly, no hernia     No rebound tenderness noted; bowel sounds normal     R groin inc healed, no erythema      Extremities:   2+ R femoral pulse, 2+ L femoral pulse     doppler+ R popliteal pulse, doppler+ L popliteal pulse     doppler+ R PT pulse, doppler+ L PT pulse     2+ R DP pulse, doppler+ L DP pulse     no RLE edema, no LLE edema    Skin: RLE without tissue loss; LLE without tissue loss    LAB RESULTS:  No results found for: CBC  Lab Results   Component Value Date    LABPROT 11.4 05/02/2019    INR 1.1 05/02/2019     Lab Results   Component Value Date     10/18/2019    K 4.3 10/18/2019     10/18/2019    CO2 25 10/18/2019    GLU 57 (L) 10/18/2019    BUN 24 (H) 10/18/2019    CREATININE 1.1 10/18/2019    CALCIUM 9.4 10/18/2019    ANIONGAP 9 10/18/2019    EGFRNONAA 48 (A) 10/18/2019     Lab Results   Component Value Date    WBC 6.48 05/04/2019    RBC 4.18 05/04/2019    HGB 10.2 (L) 05/04/2019    HCT 33.4 (L) 05/04/2019    MCV 80 (L) 05/04/2019    MCH 24.4 (L) 05/04/2019    MCHC 30.5 (L) 05/04/2019    RDW 14.5 05/04/2019     05/04/2019    MPV 10.1 05/04/2019    GRAN 4.3 05/04/2019    GRAN 65.8 05/04/2019    LYMPH 1.5 05/04/2019    LYMPH 22.8 05/04/2019    MONO 0.3 05/04/2019    MONO 4.5 05/04/2019    EOS 0.4 05/04/2019    BASO 0.03 05/04/2019    EOSINOPHIL 6.2 05/04/2019    BASOPHIL 0.5 05/04/2019    DIFFMETHOD Automated 05/04/2019     .  Lab Results   Component Value Date    HGBA1C 9.7 (H) 03/20/2019       IMAGING:  All pertinent imaging has been reviewed and interpreted independently.    11/8/18: US showing occluded fem-fem, decreased velocity R CFA, HD significant stenosis R SFA    [facility] ARCHIE: 0.4/1    1/14/19: ARCHIE R 0.4/1.1, flat L toe waveform, R toe pressure 64 mmHg    2/2019: Stress test without evidence of ischemia.   Echo with normal function.    4/24/19:   ARCHIE   1. Right lower extremity pressures and waveforms indicate mild arterial occlusive disease.  2. Left lower extremity pressures and waveforms indicate no hemodynamically significant arterial occlusive disease.    BLE arterial US  1. Right lower extremity arterial ultrasound and pressures indicate no hemodynamically significant stenosis.  The right iliac artery to femoral artery bypass is patent without evidence of hemodynamically significant stenosis.    2. Left lower extremity arterial ultrasound shows an occluded left external iliac artery.  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    CTA runoff 4/24/2019:  Patient is postop right iliac to femoral artery bypass.  There is some soft tissue stranding along the iliac graft similar to slightly increased compared to prior.  May be some narrowing at the origin of the graft.  Dilatation of the insertion may be related to surgical technique or pseudoaneurysm.  Correlation with operative procedure will be helpful.  There is decrease in the volume of fluid though a focal 1 cm collection does persist.  There is some increased soft tissue thickening about the insertion site as well as the skin.  Skin staples remain.  Occluded fem-fem bypass graft.  Significant vascular disease throughout the SFA and calf vessels bilaterally similar.  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  Cholelithiasis.    CTA 6/24/19:  Patent right iliac to femoral bypass graft and occluded femoral-femoral bypass graft.  Mild aneurysmal dilatation at the distal anastomosis, unchanged.  Patent lower extremity arterial system with scattered moderate/ high-grade stenosis throughout, as above.    Arterial US 6/24/19:  1. Right lower extremity arterial ultrasound shows a patent iliac to femoral artery bypass with distal anastomosis hemodynamically significant stenosis.  There is mid SFA stenosis.  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  Pressures indicate mild arterial occlusive disease.    ARCHIE 6/24/19:  1. Right lower extremity pressures and waveforms indicate mild arterial occlusive disease.  2. Left lower extremity pressures and waveforms indicate no hemodynamically significant arterial occlusive disease.  Ankle pressures are non-compressible, suggesting possible medial calcinosis.    BLE arterial US 10/2019:  1. Right lower extremity arterial ultrasound shows no hemodynamically significant stenosis in the iliofemoral bypass.  There is hemodynamically significant stenosis in the mid SFA.  Right femoral anastomosis measures 1.7 x 1.6 cm.  Pressures indicate mild arterial occlusive disease.  2. Left lower extremity arterial ultrasound shows an occluded femoral-femoral bypass. Pressures indicate mild arterial occlusive disease.    IMP/PLAN:  79 y.o. female with   Patient Active Problem List   Diagnosis    Lichen sclerosus et atrophicus    PVD (peripheral vascular disease)    Essential hypertension    Type 2 diabetes mellitus    Diastolic CHF, chronic    Obesity (BMI 30-39.9)    History of DVT (deep vein thrombosis)    Wound dehiscence    Fluid collection at surgical site    Cellulitis    Postoperative seroma of musculoskeletal structure after non-musculoskeletal procedure    Abscess of right groin    being managed by PCP and specialists who is here today for evaluation of RLE claudication, lifestyle limiting s/p R iliofemoral bypass 3/2019 with groin incision dehisence and superficial infection / deep seroma    -Recovering well s/p R iliofemoral bypass s/p wound washout and drainage of seroma with healing skin dehiscence of R groin inc at superior and mid aspect, no graft exposure or fascial dehiscence, 2+ DP with resolution of claudication and ischemic rest pain, now with healing wounds s/p R groin I&D of distal inc superficial skin infection and removal of  embedded staple with improvement in wound and no further infection with wound healed now and graft patent with ARCHIE 0.7/0.9 with resolved claudication/rest pain - cont routine surveillance of bypass and keeping R groin clean/dry  -Cont ASA and Pletal - rec starting statin by [facility]  -Continue heart healthy lifestyle  -Cont Abx per ID recs for suppressive therapy - instructed pt to take as Rx as she is only taking daily instead of BID now  -Cont smoking cessation  -Will review CTA and contact pt with results  -RTC 5 mo with arterial US and ARCHIE    I spent 20 minutes evaluating this patient and greater than 50% of the time was spent counseling, coordinator care and discussing the plan of care.  All questions were answered and patient stated understanding with agreement with the above treatment plan.    Danny Dunbar MD [facility]  Vascular and Endovascular Surgery

## 2019-10-21 NOTE — LETTER
October 21, 2019        [place] [address] [place] [facility] [address]             [facility] - Vascular Surgery  [address]  [facility] [address]  Phone: [phone]  Fax: [phone]   Patient: Nelsy Marino   MR Number: [id number]   YOB: 1940   Date of Visit: 10/21/2019       Dear Dr. Garay:    Thank you for referring Nelsy Marino to me for evaluation. Below are the relevant portions of my assessment and plan of care.            If you have questions, please do not hesitate to call me. I look forward to following Nelsy along with you.    Sincerely,      Danny Dunbar MD           CC  No Recipients

## 2019-11-18 ENCOUNTER — HOSPITAL ENCOUNTER (OUTPATIENT)
Dept: RADIOLOGY | Facility: HOSPITAL | Age: 79
Discharge: HOME | End: 2019-11-18
Attending: SURGERY
Payer: MEDICARE

## 2019-11-18 DIAGNOSIS — I73.9 ASYMPTOMATIC PVD (PERIPHERAL VASCULAR DISEASE): Primary | ICD-10-CM

## 2019-11-18 PROCEDURE — 25500020 PHARM REV CODE 255: Performed by: SURGERY

## 2019-11-18 PROCEDURE — 74174 CTA ABD&PLVS W/CONTRAST: CPT | Mod: TC

## 2019-11-18 PROCEDURE — 74174 CTA ABD&PLVS W/CONTRAST: CPT | Mod: 26,,, | Performed by: RADIOLOGY

## 2019-11-18 PROCEDURE — 74174 CTA ABDOMEN AND PELVIS: ICD-10-PCS | Mod: 26,,, | Performed by: RADIOLOGY

## 2019-11-18 RX ADMIN — IOHEXOL 100 ML: 350 INJECTION, SOLUTION INTRAVENOUS at 08:11

## 2019-11-18 NOTE — PROGRESS NOTES
The delay was put in manually i298 to i336, wasn't supposed to scan full scan, only where a stent was placed.

## 2020-08-25 ENCOUNTER — TELEPHONE (OUTPATIENT)
Dept: VASCULAR SURGERY | Facility: CLINIC | Age: 80
End: 2020-08-25

## 2020-08-25 NOTE — TELEPHONE ENCOUNTER
Attempted to contact patient on both numbers listed. No answer and no voicemail. Will mail out appointment slips.

## 2020-11-18 ENCOUNTER — HOSPITAL ENCOUNTER (OUTPATIENT)
Dept: CARDIOLOGY | Facility: HOSPITAL | Age: 80
Discharge: HOME | End: 2020-11-18
Attending: SURGERY
Payer: MEDICARE

## 2020-11-18 DIAGNOSIS — I73.9 ASYMPTOMATIC PVD (PERIPHERAL VASCULAR DISEASE): ICD-10-CM

## 2020-11-18 PROCEDURE — 93926 CV US DOPPLER ARTERIAL LEG RIGHT (CUPID ONLY): ICD-10-PCS | Mod: 26,RT,, | Performed by: SURGERY

## 2020-11-18 PROCEDURE — 93922 UPR/L XTREMITY ART 2 LEVELS: CPT | Mod: 26,,, | Performed by: SURGERY

## 2020-11-18 PROCEDURE — 93926 LOWER EXTREMITY STUDY: CPT | Mod: RT

## 2020-11-18 PROCEDURE — 93922 UPR/L XTREMITY ART 2 LEVELS: CPT | Mod: 50

## 2020-11-18 PROCEDURE — 93926 LOWER EXTREMITY STUDY: CPT | Mod: 26,RT,, | Performed by: SURGERY

## 2020-11-18 PROCEDURE — 93922 CV US ANKLE BRACHIAL INDICES WO STRESS W TOE TRACINGS (CUPID ONLY): ICD-10-PCS | Mod: 26,,, | Performed by: SURGERY

## 2020-11-20 LAB
LEFT ABI: 1.76
LEFT ARM BP: 145 MMHG
LEFT DORSALIS PEDIS: 255 MMHG
LEFT POSTERIOR TIBIAL: 255 MMHG
LEFT TBI: 0.63
LEFT TOE PRESSURE: 91 MMHG
OHS CV LOWER EXTREMITY GRAFT MEASUREMENTS - RIGHT - G1 - D: 73
OHS CV LOWER EXTREMITY GRAFT MEASUREMENTS - RIGHT - G1 - DA: 154
OHS CV LOWER EXTREMITY GRAFT MEASUREMENTS - RIGHT - G1 - M: 83
OHS CV LOWER EXTREMITY GRAFT MEASUREMENTS - RIGHT - G1 - P: 63
OHS CV LOWER EXTREMITY GRAFT MEASUREMENTS - RIGHT - G1 - PA: 53
OHS CV RIGHT ABI LOWER EXTREMITY (NO CALC): 0.62
RIGHT ABI: 1.75
RIGHT ANT TIBIAL SYS PSV: 32 CM/S
RIGHT ARM BP: 138 MMHG
RIGHT CFA PSV: 134 CM/S
RIGHT DORSALIS PEDIS: 254 MMHG
RIGHT PERONEAL SYS PSV: 23 CM/S
RIGHT POPLITEAL PSV: 39 CM/S
RIGHT POST TIBIAL SYS PSV: 31 CM/S
RIGHT POSTERIOR TIBIAL: 90 MMHG
RIGHT PROFUNDA SYS PSV: 93 CM/S
RIGHT SUPER FEMORAL DIST SYS PSV: 58 CM/S
RIGHT SUPER FEMORAL MID SYS PSV: 321 CM/S
RIGHT SUPER FEMORAL OSTIAL SYS PSV: 168 CM/S
RIGHT SUPER FEMORAL PROX SYS PSV: 50 CM/S
RIGHT TBI: 0.57
RIGHT TIB/PER TRUNK SYS PSV: 50 CM/S
RIGHT TOE PRESSURE: 83 MMHG

## 2021-01-06 ENCOUNTER — OFFICE VISIT (OUTPATIENT)
Dept: VASCULAR SURGERY | Facility: CLINIC | Age: 81
End: 2021-01-06
Payer: MEDICARE

## 2021-01-06 VITALS
BODY MASS INDEX: 34.24 KG/M2 | DIASTOLIC BLOOD PRESSURE: 76 MMHG | WEIGHT: 174.38 LBS | SYSTOLIC BLOOD PRESSURE: 132 MMHG | HEIGHT: 60 IN

## 2021-01-06 DIAGNOSIS — I73.9 PVD (PERIPHERAL VASCULAR DISEASE): Primary | ICD-10-CM

## 2021-01-06 DIAGNOSIS — I73.9 ASYMPTOMATIC PVD (PERIPHERAL VASCULAR DISEASE): ICD-10-CM

## 2021-01-06 PROCEDURE — 3078F PR MOST RECENT DIASTOLIC BLOOD PRESSURE < 80 MM HG: ICD-10-PCS | Mod: CPTII,S$GLB,, | Performed by: SURGERY

## 2021-01-06 PROCEDURE — 1159F PR MEDICATION LIST DOCUMENTED IN MEDICAL RECORD: ICD-10-PCS | Mod: S$GLB,,, | Performed by: SURGERY

## 2021-01-06 PROCEDURE — 1126F AMNT PAIN NOTED NONE PRSNT: CPT | Mod: S$GLB,,, | Performed by: SURGERY

## 2021-01-06 PROCEDURE — 1101F PT FALLS ASSESS-DOCD LE1/YR: CPT | Mod: CPTII,S$GLB,, | Performed by: SURGERY

## 2021-01-06 PROCEDURE — 99999 PR PBB SHADOW E&M-EST. PATIENT-LVL III: CPT | Mod: PBBFAC,,, | Performed by: SURGERY

## 2021-01-06 PROCEDURE — 3078F DIAST BP <80 MM HG: CPT | Mod: CPTII,S$GLB,, | Performed by: SURGERY

## 2021-01-06 PROCEDURE — 1159F MED LIST DOCD IN RCRD: CPT | Mod: S$GLB,,, | Performed by: SURGERY

## 2021-01-06 PROCEDURE — 3288F PR FALLS RISK ASSESSMENT DOCUMENTED: ICD-10-PCS | Mod: CPTII,S$GLB,, | Performed by: SURGERY

## 2021-01-06 PROCEDURE — 3075F SYST BP GE 130 - 139MM HG: CPT | Mod: CPTII,S$GLB,, | Performed by: SURGERY

## 2021-01-06 PROCEDURE — 3288F FALL RISK ASSESSMENT DOCD: CPT | Mod: CPTII,S$GLB,, | Performed by: SURGERY

## 2021-01-06 PROCEDURE — 99214 PR OFFICE/OUTPT VISIT, EST, LEVL IV, 30-39 MIN: ICD-10-PCS | Mod: S$GLB,,, | Performed by: SURGERY

## 2021-01-06 PROCEDURE — 99214 OFFICE O/P EST MOD 30 MIN: CPT | Mod: S$GLB,,, | Performed by: SURGERY

## 2021-01-06 PROCEDURE — 3075F PR MOST RECENT SYSTOLIC BLOOD PRESS GE 130-139MM HG: ICD-10-PCS | Mod: CPTII,S$GLB,, | Performed by: SURGERY

## 2021-01-06 PROCEDURE — 1126F PR PAIN SEVERITY QUANTIFIED, NO PAIN PRESENT: ICD-10-PCS | Mod: S$GLB,,, | Performed by: SURGERY

## 2021-01-06 PROCEDURE — 1101F PR PT FALLS ASSESS DOC 0-1 FALLS W/OUT INJ PAST YR: ICD-10-PCS | Mod: CPTII,S$GLB,, | Performed by: SURGERY

## 2021-01-06 PROCEDURE — 99999 PR PBB SHADOW E&M-EST. PATIENT-LVL III: ICD-10-PCS | Mod: PBBFAC,,, | Performed by: SURGERY

## 2022-08-30 NOTE — PROGRESS NOTES
Danny Dunbar MD [facility]                         05/23/2019    HPI:  Nelsy Marino is a 78 y.o. female with   Patient Active Problem List   Diagnosis    Lichen sclerosus et atrophicus    PVD (peripheral vascular disease)    Essential hypertension    Type 2 diabetes mellitus    Diastolic CHF, chronic    Obesity (BMI 30-39.9)    History of DVT (deep vein thrombosis)    Wound dehiscence    Fluid collection at surgical site    Cellulitis    Postoperative seroma of musculoskeletal structure after non-musculoskeletal procedure    Abscess of right groin    being managed by PCP and specialists who is here today for evaluation of RLE pain.  She is s/p fem-fem approx 20 yrs ago without issues until recently.  Fem-fem that was performed for RLE ALI was noted to be occluded on US at [facility].  Was scheduled for an angiogram at [facility] although was unable to proceed due to insurance purposes.  Patient states location is R calf and thigh occurring for 2 mo.  Associated signs and symptoms include none.  Quality is throbbing and severity is 9/10 at worst.  Symptoms began 2 mo ago.  Alleviating factors include rest.  Worsening factors include ambulation.  Pt states RLE claudication x 1 block.  States it is lifestyle limiting.     no MI  no Stroke  Tobacco use: quit 35 yrs ago    1/14/19: cont to c/o RLE pain, 1/2 block calf claudication.  Intermittent rest pain, no tissue loss.  Compliant with Pletal, has not noticed significant improvement.    2/14/19: Cont to experience 1/2 block R calf claudication.  No wounds.      4/8/19: s/p R iliofemoral bypass 3/20/19.  C/o R groin inc dehiscence by LUCIUS RN.  No F/C.  States RLE ischemic rest pain resolved, +ambulating better without issues.    4/11/19: Presents for further eval of R abundio inc dehiscence.  No F/C.    4/29/19: No new issues, states took all of PO Abx earlier than rec duration of 2 weeks by ID, no F/C.   OT Daily Note  Time In 1115   Time Out 1202     Pain:  Pt reported pain with movement. Educated pt on better movement patterns. Functional Mobility      Score Comments   Supine to Sit Partial/moderate assistance A for trunk   Sit to Supine Dependent Ax2   Sit to Stand Dependent Ax2   Transfer Assist Dependent Ax2     Pt was mod A for supine to sit with encouragement from spouse and ABDOUL Cagle. Pt required multiple rest breaks partially due to talking to ABDOUL Cagle. Recommended log roll, but pt limited command following. Pt wanted to do a LPT when a SPT was set-up. Pt demonstrated poor coordination. Pt had poor safety awareness. Pt transferred to recliner at the end of the session with SPT with better coordination, but poor descent control. Self-Care   Attempted to have pt brush teeth, but spouse did it. Activity Tolerance   Pt completed 5 minutes on the ergometer frontwards with light resistance to increase UB strength and activity tolerance for integration into functional mobility. Pt had one rest break for medication. Education   Log Roll technique     Interdisciplinary Communication: ABDOUL Cagle on pt's performance    Plan: Continue with POC. Pt was left in recliner with all needs within reach.      Jose Han OTR/L  8/30/2022 States wound healing although has not used soap.  No RLE claudication or rest pain, no wounds to R foot.  States RLE pain resolved.    19:  On PO Abx.  Denies F/C.  Keeping clean with soap and water now.    19:  Doing well, keeping inc clean/dry, no complaints, no F/C.  Remains on Abx per ID recs.    Interval history: No new issues, no F/C.  Remains on Abx.    Past Medical History:   Diagnosis Date    CHF (congestive heart failure)     Diabetes mellitus     DVT (deep venous thrombosis)     Hypertension     Miscarriage      Past Surgical History:   Procedure Laterality Date     femoral vascular surgery       CREATION, BYPASS, ARTERIAL, ILIAC TO FEMORAL, RIGHT LOWER EXTREMITY, RIGHT PROFUNDAPLASTY Right 3/20/2019    [facility]    HYSTERECTOMY      ?unsure cervix present    Incision and Drainage - R groin washout, possible muscle flap Right 2019    Performed by [facility] at [facility] OR [facility]    Incision and Drainage R groin Right 5/3/2019    Performed by Danny Dunbar MD at [facility] OR 2ND FLR     Family History   Problem Relation Age of Onset    Diabetes Father     Hypertension Father      Social History     Socioeconomic History    Marital status:      Spouse name: Not on file    Number of children: Not on file    Years of education: Not on file    Highest education level: Not on file   Occupational History    Not on file   Social Needs    Financial resource strain: Not on file    Food insecurity:     Worry: Not on file     Inability: Not on file    Transportation needs:     Medical: Not on file     Non-medical: Not on file   Tobacco Use    Smoking status: Former Smoker     Last attempt to quit: 1980     Years since quittin.4    Smokeless tobacco: Never Used   Substance and Sexual Activity    Alcohol use: No    Drug use: No    Sexual activity: Never   Lifestyle    Physical activity:     Days per week: Not on file  "    Minutes per session: Not on file    Stress: Not on file   Relationships    Social connections:     Talks on phone: Not on file     Gets together: Not on file     Attends [employer] witness service: Not on file     Active member of club or organization: Not on file     Attends meetings of clubs or organizations: Not on file     Relationship status: Not on file   Other Topics Concern    Not on file   Social History Narrative    Not on file       Current Outpatient Medications:     amlodipine (NORVASC) 10 MG tablet, Take 10 mg by mouth once daily., Disp: , Rfl:     aspirin (ECOTRIN) 81 MG EC tablet, Take 81 mg by mouth once daily., Disp: , Rfl:     BLOOD SUGAR DIAGNOSTIC (TRUE METRIX GLUCOSE TEST STRIP MISC), by Misc.(Non-Drug; Combo Route) route., Disp: , Rfl:     cilostazol (PLETAL) 50 MG Tab, Take 1 tablet (50 mg total) by mouth 2 (two) times daily. If patient tolerate medication after 4 weeks, increase dose to 100 mg twice daily., Disp: 60 tablet, Rfl: 11    doxycycline (VIBRA-TABS) 100 MG tablet, Take 1 tablet (100 mg total) by mouth 2 (two) times daily., Disp: 56 tablet, Rfl: 0    glipiZIDE (GLUCOTROL) 10 MG tablet, Take 10 mg by mouth 2 (two) times daily before meals., Disp: , Rfl:     insulin aspart protamine-insulin aspart (NOVOLOG 70/30) 100 unit/mL (70-30) InPn pen, Inject 45 Units into the skin every morning. , Disp: , Rfl:     insulin aspart protamine-insulin aspart (NOVOLOG MIX 70-30FLEXPEN U-100) 100 unit/mL (70-30) InPn pen, Inject 40 Units into the skin every evening., Disp: , Rfl:     insulin syringe-needle,dispos. 0.3 mL 30 gauge x 5/16" Syrg, by Misc.(Non-Drug; Combo Route) route., Disp: , Rfl:     levoFLOXacin (LEVAQUIN) 750 MG tablet, Take 1 tablet (750 mg total) by mouth once daily., Disp: 28 tablet, Rfl: 0    losartan-hydrochlorothiazide 100-25 mg (HYZAAR) 100-25 mg per tablet, Take 1 tablet by mouth once daily., Disp: , Rfl:     metformin (GLUCOPHAGE) 1000 MG tablet, Take 1,000 mg by " mouth 2 (two) times daily with meals., Disp: , Rfl:     oxyCODONE-acetaminophen (PERCOCET) 5-325 mg per tablet, Take 1 tablet by mouth every 4 (four) hours as needed., Disp: 15 tablet, Rfl: 0    REVIEW OF SYSTEMS:  General: No fevers or chills; ENT: No sore throat; Allergy and Immunology: no persistent infections; Hematological and Lymphatic: No history of bleeding or easy bruising; Endocrine: negative; Respiratory: no cough, shortness of breath, or wheezing; Cardiovascular: no chest pain or dyspnea on exertion; Gastrointestinal: no abdominal pain/back, change in bowel habits, or bloody stools; Genito-Urinary: no dysuria, trouble voiding, or hematuria; Musculoskeletal: negative; Neurological: no TIA or stroke symptoms; Psychiatric: no nervousness, anxiety or depression.    PHYSICAL EXAM:                General appearance:  Alert, well-appearing, and in no distress.  Oriented to person, place, and time                    Neurological: Normal speech, no focal findings noted; CN II - XII grossly intact. RLE with sensation to light touch, LLE with sensation to light touch.            Musculoskeletal: Digits/nail without cyanosis/clubbing.  Strength 5/5 BLE.                    Neck: Supple, no significant adenopathy                  Chest:  Clear to auscultation, no wheezes, rales or rhonchi, symmetric air entry. No use of accessory muscles               Cardiac: Normal rate and regular rhythm, S1 and S2 normal            Abdomen: Soft, nontender, nondistended, no masses or organomegaly, no hernia     No rebound tenderness noted; bowel sounds normal     R groin inc with minimal dehiscence superiorly of skin and to mid aspect healing well with +granulation tissue, no graft exposure, abdominal inc well healed without infection; groin wound healing well, no purulence from wound, no surrounding erythema, min dehiscence to mid aspect with +granulation tissue, improving       Extremities:   2+ R femoral pulse, 2+ L femoral  pulse     doppler+ R popliteal pulse, doppler+ L popliteal pulse     doppler+ R PT pulse, doppler+ L PT pulse     2+ R DP pulse, doppler+ L DP pulse     no RLE edema, no LLE edema    Skin: RLE without tissue loss; LLE without tissue loss    LAB RESULTS:  No results found for: CBC  Lab Results   Component Value Date    LABPROT 11.4 05/02/2019    INR 1.1 05/02/2019     Lab Results   Component Value Date     05/04/2019    K 4.0 05/04/2019     05/04/2019    CO2 27 05/04/2019    GLU 99 05/04/2019    BUN 14 05/04/2019    CREATININE 1.1 05/04/2019    CALCIUM 9.5 05/04/2019    ANIONGAP 8 05/04/2019    EGFRNONAA 48.2 (A) 05/04/2019     Lab Results   Component Value Date    WBC 6.48 05/04/2019    RBC 4.18 05/04/2019    HGB 10.2 (L) 05/04/2019    HCT 33.4 (L) 05/04/2019    MCV 80 (L) 05/04/2019    MCH 24.4 (L) 05/04/2019    MCHC 30.5 (L) 05/04/2019    RDW 14.5 05/04/2019     05/04/2019    MPV 10.1 05/04/2019    GRAN 4.3 05/04/2019    GRAN 65.8 05/04/2019    LYMPH 1.5 05/04/2019    LYMPH 22.8 05/04/2019    MONO 0.3 05/04/2019    MONO 4.5 05/04/2019    EOS 0.4 05/04/2019    BASO 0.03 05/04/2019    EOSINOPHIL 6.2 05/04/2019    BASOPHIL 0.5 05/04/2019    DIFFMETHOD Automated 05/04/2019     .  Lab Results   Component Value Date    HGBA1C 9.7 (H) 03/20/2019       IMAGING:  All pertinent imaging has been reviewed and interpreted independently.    11/8/18: US showing occluded fem-fem, decreased velocity R CFA, HD significant stenosis R SFA    [facility] ARCHIE: 0.4/1    1/14/19: ARCHIE R 0.4/1.1, flat L toe waveform, R toe pressure 64 mmHg    2/2019: Stress test without evidence of ischemia.  Echo with normal function.    4/24/19:   ARCHIE   1. Right lower extremity pressures and waveforms indicate mild arterial occlusive disease.  2. Left lower extremity pressures and waveforms indicate no hemodynamically significant arterial occlusive disease.    BLE arterial US  1. Right lower extremity arterial ultrasound and pressures indicate  no hemodynamically significant stenosis.  The right iliac artery to femoral artery bypass is patent without evidence of hemodynamically significant stenosis.    2. Left lower extremity arterial ultrasound shows an occluded left external iliac artery.  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    CTA runoff 4/24/2019:  Patient is postop right iliac to femoral artery bypass.  There is some soft tissue stranding along the iliac graft similar to slightly increased compared to prior.  May be some narrowing at the origin of the graft.  Dilatation of the insertion may be related to surgical technique or pseudoaneurysm.  Correlation with operative procedure will be helpful.  There is decrease in the volume of fluid though a focal 1 cm collection does persist.  There is some increased soft tissue thickening about the insertion site as well as the skin.  Skin staples remain.  Occluded fem-fem bypass graft.  Significant vascular disease throughout the SFA and calf vessels bilaterally similar.  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  Cholelithiasis.    IMP/PLAN:  78 y.o. female with   Patient Active Problem List   Diagnosis    Lichen sclerosus et atrophicus    PVD (peripheral vascular disease)    Essential hypertension    Type 2 diabetes mellitus    Diastolic CHF, chronic    Obesity (BMI 30-39.9)    History of DVT (deep vein thrombosis)    Wound dehiscence    Fluid collection at surgical site    Cellulitis    Postoperative seroma of musculoskeletal structure after non-musculoskeletal procedure    Abscess of right groin    being managed by PCP and specialists who is here today for evaluation of RLE claudication, lifestyle limiting s/p R iliofemoral bypass 3/2019 with groin incision dehisence and superficial infection / deep seroma    -Recovering well s/p R iliofemoral bypass s/p wound washout and drainage of seroma with healing skin dehiscence of R groin inc at [place] and  mid aspect, no graft exposure or fascial dehiscence, 2+ DP with resolution of claudication and ischemic rest pain, now with healing wounds although persistent induration to distal aspect of incision with superficial skin infection with min purulence and improvement of fluid collection on repeat CTA without abscess noted s/p R groin I&D of distal inc superficial skin infection and removal of embedded staple with improvement in wound and no further infection with wound healing well now - cont local wound care keeping clean/dry, cont ID f/u and rec Abx  -Cont ASA, statin and Pletal  -Heart healthy lifestyle  -Cont smoking cessation  -Plan for CTA runoff and US in 1 mo (6/2019)  -RTC 2 weeks for wound check    I spent 20 minutes evaluating this patient and greater than 50% of the time was spent counseling, coordinator care and discussing the plan of care.  All questions were answered and patient stated understanding with agreement with the above treatment plan.    Danny Dunbar MD [facility]  Vascular and Endovascular Surgery

## 2023-01-09 ENCOUNTER — OFFICE VISIT (OUTPATIENT)
Dept: VASCULAR SURGERY | Facility: CLINIC | Age: 83
End: 2023-01-09
Payer: MEDICARE

## 2023-01-09 VITALS
DIASTOLIC BLOOD PRESSURE: 64 MMHG | BODY MASS INDEX: 32.55 KG/M2 | SYSTOLIC BLOOD PRESSURE: 141 MMHG | WEIGHT: 166.69 LBS

## 2023-01-09 DIAGNOSIS — I73.9 PERIPHERAL VASCULAR DISEASE, UNSPECIFIED: Primary | ICD-10-CM

## 2023-01-09 PROCEDURE — 3078F DIAST BP <80 MM HG: CPT | Mod: CPTII,S$GLB,, | Performed by: SURGERY

## 2023-01-09 PROCEDURE — 1126F AMNT PAIN NOTED NONE PRSNT: CPT | Mod: CPTII,S$GLB,, | Performed by: SURGERY

## 2023-01-09 PROCEDURE — 99999 PR PBB SHADOW E&M-EST. PATIENT-LVL III: CPT | Mod: PBBFAC,,, | Performed by: SURGERY

## 2023-01-09 PROCEDURE — 99999 PR PBB SHADOW E&M-EST. PATIENT-LVL III: ICD-10-PCS | Mod: PBBFAC,,, | Performed by: SURGERY

## 2023-01-09 PROCEDURE — 3288F PR FALLS RISK ASSESSMENT DOCUMENTED: ICD-10-PCS | Mod: CPTII,S$GLB,, | Performed by: SURGERY

## 2023-01-09 PROCEDURE — 1159F PR MEDICATION LIST DOCUMENTED IN MEDICAL RECORD: ICD-10-PCS | Mod: CPTII,S$GLB,, | Performed by: SURGERY

## 2023-01-09 PROCEDURE — 99215 OFFICE O/P EST HI 40 MIN: CPT | Mod: S$GLB,,, | Performed by: SURGERY

## 2023-01-09 PROCEDURE — 3288F FALL RISK ASSESSMENT DOCD: CPT | Mod: CPTII,S$GLB,, | Performed by: SURGERY

## 2023-01-09 PROCEDURE — 1159F MED LIST DOCD IN RCRD: CPT | Mod: CPTII,S$GLB,, | Performed by: SURGERY

## 2023-01-09 PROCEDURE — 3077F SYST BP >= 140 MM HG: CPT | Mod: CPTII,S$GLB,, | Performed by: SURGERY

## 2023-01-09 PROCEDURE — 1126F PR PAIN SEVERITY QUANTIFIED, NO PAIN PRESENT: ICD-10-PCS | Mod: CPTII,S$GLB,, | Performed by: SURGERY

## 2023-01-09 PROCEDURE — 3077F PR MOST RECENT SYSTOLIC BLOOD PRESSURE >= 140 MM HG: ICD-10-PCS | Mod: CPTII,S$GLB,, | Performed by: SURGERY

## 2023-01-09 PROCEDURE — 99215 PR OFFICE/OUTPT VISIT, EST, LEVL V, 40-54 MIN: ICD-10-PCS | Mod: S$GLB,,, | Performed by: SURGERY

## 2023-01-09 PROCEDURE — 1101F PT FALLS ASSESS-DOCD LE1/YR: CPT | Mod: CPTII,S$GLB,, | Performed by: SURGERY

## 2023-01-09 PROCEDURE — 1101F PR PT FALLS ASSESS DOC 0-1 FALLS W/OUT INJ PAST YR: ICD-10-PCS | Mod: CPTII,S$GLB,, | Performed by: SURGERY

## 2023-01-09 PROCEDURE — 3078F PR MOST RECENT DIASTOLIC BLOOD PRESSURE < 80 MM HG: ICD-10-PCS | Mod: CPTII,S$GLB,, | Performed by: SURGERY

## 2023-01-09 NOTE — PROGRESS NOTES
Danny Dunbar MD [facility]                         01/09/2023    HPI:  Nelsy Marino is a 82 y.o. female with   Patient Active Problem List   Diagnosis    Lichen sclerosus et atrophicus    PVD (peripheral vascular disease)    Essential hypertension    Type 2 diabetes mellitus    Diastolic CHF, chronic    Obesity (BMI 30-39.9)    History of DVT (deep vein thrombosis)    Wound dehiscence    Fluid collection at surgical site    Cellulitis    Postoperative seroma of musculoskeletal structure after non-musculoskeletal procedure    Abscess of right groin    being managed by PCP and specialists who is here today for evaluation of RLE pain.  She is s/p fem-fem approx 20 yrs ago without issues until recently.  Fem-fem that was performed for RLE ALI was noted to be occluded on US at [facility].  Was scheduled for an angiogram at [facility] although was unable to proceed due to insurance purposes.  Patient states location is R calf and thigh occurring for 2 mo.  Associated signs and symptoms include none.  Quality is throbbing and severity is 9/10 at worst.  Symptoms began 2 mo ago.  Alleviating factors include rest.  Worsening factors include ambulation.  Pt states RLE claudication x 1 block.  States it is lifestyle limiting.     no MI  no Stroke  Tobacco use: quit 35 yrs ago    1/14/19: cont to c/o RLE pain, 1/2 block calf claudication.  Intermittent rest pain, no tissue loss.  Compliant with Pletal, has not noticed significant improvement.    2/14/19: Cont to experience 1/2 block R calf claudication.  No wounds.      4/8/19: s/p R iliofemoral bypass 3/20/19.  C/o R groin inc dehiscence by LUCIUS RN.  No F/C.  States RLE ischemic rest pain resolved, +ambulating better without issues.    4/11/19: Presents for further eval of R abundio inc dehiscence.  No F/C.    4/29/19: No new issues, states took all of PO Abx earlier than rec duration of 2 weeks by ID, no F/C.  States wound  healing although has not used soap.  No RLE claudication or rest pain, no wounds to R foot.  States RLE pain resolved.    5/9/19:  On PO Abx.  Denies F/C.  Keeping clean with soap and water now.    5/16/19:  Doing well, keeping inc clean/dry, no complaints, no F/C.  Remains on Abx per ID recs.    5/23/19: No new issues, no F/C.  Remains on Abx.    6/6/19:  No new issues or complaints.  No F/C.  R foot paresthesias resolved.    7/2019:  Occasionally paresthesias R foot.  No claudication.  +ambulation.  No F/C.    10/2019:  No complaints today.  No signs of infection, no claudication.  Ambulating well.  Has resumed daily activities.    01/20/2021:  Patient without complaints today.  Ambulating without claudication.  Wounds have healed.  She is compliant with antibiotics and all of her other medications.    1/2023:  c/o poorly healing R great toenail s/p ingrown toenail surgery.  [facility] has stopped her Abx and ASA.  C/o R foot pain.    Past Medical History:   Diagnosis Date    CHF (congestive heart failure)     Diabetes mellitus     DVT (deep venous thrombosis)     Hypertension     Miscarriage      Past Surgical History:   Procedure Laterality Date     femoral vascular surgery       CREATION OF ILIOFEMORAL ARTERY BYPASS Right 3/20/2019    Procedure: CREATION, BYPASS, ARTERIAL, ILIAC TO FEMORAL, RIGHT LOWER EXTREMITY, RIGHT PROFUNDAPLASTY;  Surgeon: Danny Dunbar MD;  Location: [facility] OR;  Service: Vascular;  Laterality: Right;  11:00AM START PER ANNE RICHARDS PREOP 3/13/2019  ACT'S, CELL SAVER, GRAFTS, BOOK WATER---NEED H/P  CONSENT IN AM------HGBA1C    HYSTERECTOMY      ?unsure cervix present    INCISION AND DRAINAGE Right 4/12/2019    Procedure: Incision and Drainage - R groin washout, possible muscle flap;  Surgeon: Danny Dunbar MD;  Location: [facility] OR [address] [place] [address];  Service: Vascular;  Laterality: Right;  groin washout    INCISION AND DRAINAGE OF GROIN Right 5/3/2019    Procedure: Incision and Drainage R  "groin;  Surgeon: Danny Dunbar MD;  Location: [facility] OR [address] [place] [address];  Service: Vascular;  Laterality: Right;     Family History   Problem Relation Age of Onset    Diabetes Father     Hypertension Father      Social History     Socioeconomic History    Marital status:    Tobacco Use    Smoking status: Former     Types: Cigarettes     Quit date:      Years since quittin.0    Smokeless tobacco: Never   Substance and Sexual Activity    Alcohol use: No    Drug use: No    Sexual activity: Never       Current Outpatient Medications:     amlodipine (NORVASC) 10 MG tablet, Take 10 mg by mouth once daily., Disp: , Rfl:     aspirin (ECOTRIN) 81 MG EC tablet, Take 81 mg by mouth once daily., Disp: , Rfl:     BLOOD SUGAR DIAGNOSTIC (TRUE METRIX GLUCOSE TEST STRIP MISC), by Misc.(Non-Drug; Combo Route) route., Disp: , Rfl:     cephALEXin (KEFLEX) 500 MG capsule, TAKE 1 CAPSULE BY MOUTH 4 TIMES DAILY, Disp: , Rfl: 6    glipiZIDE (GLUCOTROL) 10 MG tablet, Take 10 mg by mouth 2 (two) times daily before meals., Disp: , Rfl:     insulin aspart protamine-insulin aspart (NOVOLOG 70/30) 100 unit/mL (70-30) InPn pen, Inject 45 Units into the skin every morning. , Disp: , Rfl:     insulin aspart protamine-insulin aspart (NOVOLOG 70/30) 100 unit/mL (70-30) InPn pen, Inject 40 Units into the skin every evening., Disp: , Rfl:     insulin syringe-needle,dispos. 0.3 mL 30 gauge x 5/16" Syrg, by Misc.(Non-Drug; Combo Route) route., Disp: , Rfl:     losartan-hydrochlorothiazide 100-25 mg (HYZAAR) 100-25 mg per tablet, Take 1 tablet by mouth once daily., Disp: , Rfl:     metformin (GLUCOPHAGE) 1000 MG tablet, Take 1,000 mg by mouth 2 (two) times daily with meals., Disp: , Rfl:     ondansetron (ZOFRAN) 8 MG tablet, , Disp: , Rfl: 0    oxyCODONE-acetaminophen (PERCOCET) 5-325 mg per tablet, Take 1 tablet by mouth every 4 (four) hours as needed., Disp: 15 tablet, Rfl: 0    cilostazol (PLETAL) 50 MG Tab, Take 1 tablet (50 mg total) by " mouth 2 (two) times daily. If patient tolerate medication after 4 weeks, increase dose to 100 mg twice daily., Disp: 60 tablet, Rfl: 11    REVIEW OF SYSTEMS:  General: No fevers or chills; ENT: No sore throat; Allergy and Immunology: no persistent infections; Hematological and Lymphatic: No history of bleeding or easy bruising; Endocrine: negative; Respiratory: no cough, shortness of breath, or wheezing; Cardiovascular: no chest pain or dyspnea on exertion; Gastrointestinal: no abdominal pain/back, change in bowel habits, or bloody stools; Genito-Urinary: no dysuria, trouble voiding, or hematuria; Musculoskeletal: negative; Neurological: no TIA or stroke symptoms; Psychiatric: no nervousness, anxiety or depression.    PHYSICAL EXAM:                General appearance:  Alert, well-appearing, and in no distress.  Oriented to person, place, and time                    Neurological: Normal speech, no focal findings noted; CN II - XII grossly intact. RLE with sensation to light touch, LLE with sensation to light touch.            Musculoskeletal: Digits/nail without cyanosis/clubbing.  Strength 5/5 BLE.                    Neck: Supple, no significant adenopathy                  Chest:  Clear to auscultation, no wheezes, rales or rhonchi, symmetric air entry. No use of accessory muscles               Cardiac: Normal rate and regular rhythm, S1 and S2 normal            Abdomen: Soft, nontender, nondistended, no masses or organomegaly, no hernia     No rebound tenderness noted; bowel sounds normal     R groin inc healed, no erythema      Extremities:   Unable to palpate R femoral pulse, 2+ L femoral pulse     doppler+ R popliteal pulse, doppler+ L popliteal pulse     doppler+ R PT pulse, doppler+ L PT pulse     2+ R DP pulse, doppler+ L DP pulse     no RLE edema, no LLE edema    Skin: RLE without tissue loss; LLE without tissue loss    LAB RESULTS:  No results found for: CBC  Lab Results   Component Value Date    LABPROT  11.4 05/02/2019    INR 1.1 05/02/2019     Lab Results   Component Value Date     10/18/2019    K 4.3 10/18/2019     10/18/2019    CO2 25 10/18/2019    GLU 57 (L) 10/18/2019    BUN 24 (H) 10/18/2019    CREATININE 1.1 10/18/2019    CALCIUM 9.4 10/18/2019    ANIONGAP 9 10/18/2019    EGFRNONAA 48 (A) 10/18/2019     Lab Results   Component Value Date    WBC 6.48 05/04/2019    RBC 4.18 05/04/2019    HGB 10.2 (L) 05/04/2019    HCT 33.4 (L) 05/04/2019    MCV 80 (L) 05/04/2019    MCH 24.4 (L) 05/04/2019    MCHC 30.5 (L) 05/04/2019    RDW 14.5 05/04/2019     05/04/2019    MPV 10.1 05/04/2019    GRAN 4.3 05/04/2019    GRAN 65.8 05/04/2019    LYMPH 1.5 05/04/2019    LYMPH 22.8 05/04/2019    MONO 0.3 05/04/2019    MONO 4.5 05/04/2019    EOS 0.4 05/04/2019    BASO 0.03 05/04/2019    EOSINOPHIL 6.2 05/04/2019    BASOPHIL 0.5 05/04/2019    DIFFMETHOD Automated 05/04/2019     .  Lab Results   Component Value Date    HGBA1C 9.7 (H) 03/20/2019       IMAGING:  All pertinent imaging has been reviewed and interpreted independently.    11/8/18: US showing occluded fem-fem, decreased velocity R CFA, HD significant stenosis R SFA    [facility] ARCHIE: 0.4/1    1/14/19: ARCHIE R 0.4/1.1, flat L toe waveform, R toe pressure 64 mmHg    2/2019: Stress test without evidence of ischemia.  Echo with normal function.    4/24/19:   ARCHIE   1. Right lower extremity pressures and waveforms indicate mild arterial occlusive disease.  2. Left lower extremity pressures and waveforms indicate no hemodynamically significant arterial occlusive disease.    BLE arterial US  1. Right lower extremity arterial ultrasound and pressures indicate no hemodynamically significant stenosis.  The right iliac artery to femoral artery bypass is patent without evidence of hemodynamically significant stenosis.    2. Left lower extremity arterial ultrasound shows an occluded left external iliac artery.  Pressures are non-compressible suggesting likely medial  calcinosis.    CTA runoff 4/24/2019:  Patient is postop right iliac to femoral artery bypass.  There is some soft tissue stranding along the iliac graft similar to slightly increased compared to prior.  May be some narrowing at the origin of the graft.  Dilatation of the insertion may be related to surgical technique or pseudoaneurysm.  Correlation with operative procedure will be helpful.  There is decrease in the volume of fluid though a focal 1 cm collection does persist.  There is some increased soft tissue thickening about the insertion site as well as the skin.  Skin staples remain.  Occluded fem-fem bypass graft.  Significant vascular disease throughout the SFA and calf vessels bilaterally similar.  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  Cholelithiasis.    CTA 6/24/19:  Patent right iliac to femoral bypass graft and occluded femoral-femoral bypass graft.  Mild aneurysmal dilatation at the distal anastomosis, unchanged.  Patent lower extremity arterial system with scattered moderate/ high-grade stenosis throughout, as above.    Arterial US 6/24/19:  1. Right lower extremity arterial ultrasound shows a patent iliac to femoral artery bypass with distal anastomosis hemodynamically significant stenosis.  There is mid SFA stenosis.  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  Pressures indicate mild arterial occlusive disease.    ARCHIE 6/24/19:  1. Right lower extremity pressures and waveforms indicate mild arterial occlusive disease.  2. Left lower extremity pressures and waveforms indicate no hemodynamically significant arterial occlusive disease.  Ankle pressures are non-compressible, suggesting possible medial calcinosis.    BLE arterial US 10/2019:  1. Right lower extremity arterial ultrasound shows no hemodynamically significant stenosis in the iliofemoral bypass.  There is hemodynamically significant stenosis in the mid SFA.  Right femoral  anastomosis measures 1.7 x 1.6 cm.  Pressures indicate mild arterial occlusive disease.  2. Left lower extremity arterial ultrasound shows an occluded femoral-femoral bypass. Pressures indicate mild arterial occlusive disease.    11/2020:  1. Right lower extremity arterial ultrasound shows a patent right iliac to femoral bypass graft with approximately 50% stenosis of the distal anastomosis.  There is hemodynamically significant stenosis of the mid SFA.  Pressures indicate no arterial occlusive disease.  The femoral anastomosis measures 1.8 x 1.6 x 1.7 cm.    IMP/PLAN:  82 y.o. female with   Patient Active Problem List   Diagnosis    Lichen sclerosus et atrophicus    PVD (peripheral vascular disease)    Essential hypertension    Type 2 diabetes mellitus    Diastolic CHF, chronic    Obesity (BMI 30-39.9)    History of DVT (deep vein thrombosis)    Wound dehiscence    Fluid collection at surgical site    Cellulitis    Postoperative seroma of musculoskeletal structure after non-musculoskeletal procedure    Abscess of right groin    being managed by PCP and specialists who is here today for evaluation of RLE claudication, lifestyle limiting s/p R iliofemoral bypass 3/2019 with groin incision dehisence and superficial infection / deep seroma    -Recovering well s/p R iliofemoral bypass s/p wound washout and drainage of seroma with healing skin dehiscence of R groin inc at superior and mid aspect, no graft exposure or fascial dehiscence, 2+ DP with resolution of claudication and ischemic rest pain, now with healing wounds s/p R groin I&D of distal inc superficial skin infection and removal of embedded staple with improvement in wound and no further infection with wound healed now and graft patent with ARCHIE 0.7/0.9 with resolved claudication/rest pain with decreased R femoral pulse and concern for bypass occlusion with poorly healing R great toe- CTA runoff, US and ARCHIE  -Cont ASA and Pletal, and Lipitor  -Continue heart  healthy lifestyle  -Cont Abx per ID recs for suppressive therapy   -Cont smoking cessation  -RTC 6 mo with arterial US and ARCHIE    I spent 11 minutes evaluating this patient and greater than 50% of the time was spent counseling, coordinator care and discussing the plan of care.  All questions were answered and patient stated understanding with agreement with the above treatment plan.    Danny Dunbar MD [facility]  Vascular and Endovascular Surgery

## 2023-01-23 DIAGNOSIS — I73.9 PVD (PERIPHERAL VASCULAR DISEASE): Chronic | ICD-10-CM

## 2023-01-23 DIAGNOSIS — I73.9 PERIPHERAL VASCULAR DISEASE, UNSPECIFIED: Primary | ICD-10-CM

## 2023-01-30 ENCOUNTER — LAB VISIT (OUTPATIENT)
Dept: LAB | Facility: HOSPITAL | Age: 83
End: 2023-01-30
Attending: SURGERY
Payer: MEDICARE

## 2023-01-30 DIAGNOSIS — I73.9 PVD (PERIPHERAL VASCULAR DISEASE): Chronic | ICD-10-CM

## 2023-01-30 LAB
CREAT SERPL-MCNC: 1.1 MG/DL (ref 0.5–1.4)
EST. GFR  (NO RACE VARIABLE): 50 ML/MIN/1.73 M^2

## 2023-01-30 PROCEDURE — 82565 ASSAY OF CREATININE: CPT | Performed by: SURGERY

## 2023-01-30 PROCEDURE — 36415 COLL VENOUS BLD VENIPUNCTURE: CPT | Performed by: SURGERY

## 2023-02-13 ENCOUNTER — HOSPITAL ENCOUNTER (OUTPATIENT)
Dept: RADIOLOGY | Facility: HOSPITAL | Age: 83
Discharge: HOME | End: 2023-02-13
Attending: SURGERY
Payer: MEDICARE

## 2023-02-13 DIAGNOSIS — I73.9 PERIPHERAL VASCULAR DISEASE, UNSPECIFIED: ICD-10-CM

## 2023-02-13 PROCEDURE — 75635 CT ANGIO ABDOMINAL ARTERIES: CPT | Mod: 26,,, | Performed by: RADIOLOGY

## 2023-02-13 PROCEDURE — 75635 CTA RUNOFF ABD PEL BILAT LOWER EXT: ICD-10-PCS | Mod: 26,,, | Performed by: RADIOLOGY

## 2023-02-13 PROCEDURE — 25500020 PHARM REV CODE 255: Performed by: SURGERY

## 2023-02-13 PROCEDURE — 75635 CT ANGIO ABDOMINAL ARTERIES: CPT | Mod: TC

## 2023-02-13 RX ADMIN — IOHEXOL 100 ML: 350 INJECTION, SOLUTION INTRAVENOUS at 02:02

## 2023-02-15 ENCOUNTER — OFFICE VISIT (OUTPATIENT)
Dept: VASCULAR SURGERY | Facility: CLINIC | Age: 83
End: 2023-02-15
Payer: MEDICARE

## 2023-02-15 ENCOUNTER — LAB VISIT (OUTPATIENT)
Dept: LAB | Facility: HOSPITAL | Age: 83
End: 2023-02-15
Attending: SURGERY
Payer: MEDICARE

## 2023-02-15 VITALS — DIASTOLIC BLOOD PRESSURE: 77 MMHG | WEIGHT: 163.38 LBS | SYSTOLIC BLOOD PRESSURE: 143 MMHG | BODY MASS INDEX: 31.9 KG/M2

## 2023-02-15 DIAGNOSIS — I73.9 PERIPHERAL VASCULAR DISEASE, UNSPECIFIED: Primary | ICD-10-CM

## 2023-02-15 DIAGNOSIS — R79.1 ABNORMAL COAGULATION PROFILE: ICD-10-CM

## 2023-02-15 DIAGNOSIS — I70.201 ATHEROSCLEROSIS OF ARTERY OF RIGHT LOWER EXTREMITY: ICD-10-CM

## 2023-02-15 DIAGNOSIS — R93.89 ABNORMAL FINDINGS ON DIAGNOSTIC IMAGING OF OTHER SPECIFIED BODY STRUCTURES: ICD-10-CM

## 2023-02-15 LAB
ANION GAP SERPL CALC-SCNC: 12 MMOL/L (ref 8–16)
APTT BLDCRRT: 28.3 SEC (ref 21–32)
BASOPHILS # BLD AUTO: 0.02 K/UL (ref 0–0.2)
BASOPHILS NFR BLD: 0.2 % (ref 0–1.9)
BUN SERPL-MCNC: 26 MG/DL (ref 8–23)
CALCIUM SERPL-MCNC: 9.3 MG/DL (ref 8.7–10.5)
CHLORIDE SERPL-SCNC: 104 MMOL/L (ref 95–110)
CO2 SERPL-SCNC: 26 MMOL/L (ref 23–29)
CREAT SERPL-MCNC: 1 MG/DL (ref 0.5–1.4)
DIFFERENTIAL METHOD: ABNORMAL
EOSINOPHIL # BLD AUTO: 0.3 K/UL (ref 0–0.5)
EOSINOPHIL NFR BLD: 2.4 % (ref 0–8)
ERYTHROCYTE [DISTWIDTH] IN BLOOD BY AUTOMATED COUNT: 14.2 % (ref 11.5–14.5)
EST. GFR  (NO RACE VARIABLE): 56 ML/MIN/1.73 M^2
GLUCOSE SERPL-MCNC: 157 MG/DL (ref 70–110)
HCT VFR BLD AUTO: 35 % (ref 37–48.5)
HGB BLD-MCNC: 10.7 G/DL (ref 12–16)
IMM GRANULOCYTES # BLD AUTO: 0.03 K/UL (ref 0–0.04)
IMM GRANULOCYTES NFR BLD AUTO: 0.3 % (ref 0–0.5)
INR PPP: 1.1 (ref 0.8–1.2)
LYMPHOCYTES # BLD AUTO: 1.6 K/UL (ref 1–4.8)
LYMPHOCYTES NFR BLD: 15.3 % (ref 18–48)
MAGNESIUM SERPL-MCNC: 2.3 MG/DL (ref 1.6–2.6)
MCH RBC QN AUTO: 24.3 PG (ref 27–31)
MCHC RBC AUTO-ENTMCNC: 30.6 G/DL (ref 32–36)
MCV RBC AUTO: 80 FL (ref 82–98)
MONOCYTES # BLD AUTO: 0.7 K/UL (ref 0.3–1)
MONOCYTES NFR BLD: 7.2 % (ref 4–15)
NEUTROPHILS # BLD AUTO: 7.6 K/UL (ref 1.8–7.7)
NEUTROPHILS NFR BLD: 74.6 % (ref 38–73)
NRBC BLD-RTO: 0 /100 WBC
PHOSPHATE SERPL-MCNC: 4 MG/DL (ref 2.7–4.5)
PLATELET # BLD AUTO: 395 K/UL (ref 150–450)
PMV BLD AUTO: 10.5 FL (ref 9.2–12.9)
POTASSIUM SERPL-SCNC: 4.5 MMOL/L (ref 3.5–5.1)
PROTHROMBIN TIME: 11.1 SEC (ref 9–12.5)
RBC # BLD AUTO: 4.4 M/UL (ref 4–5.4)
SODIUM SERPL-SCNC: 142 MMOL/L (ref 136–145)
WBC # BLD AUTO: 10.22 K/UL (ref 3.9–12.7)

## 2023-02-15 PROCEDURE — 99215 PR OFFICE/OUTPT VISIT, EST, LEVL V, 40-54 MIN: ICD-10-PCS | Mod: S$GLB,,, | Performed by: SURGERY

## 2023-02-15 PROCEDURE — 99999 PR PBB SHADOW E&M-EST. PATIENT-LVL IV: ICD-10-PCS | Mod: PBBFAC,,, | Performed by: SURGERY

## 2023-02-15 PROCEDURE — 85610 PROTHROMBIN TIME: CPT | Performed by: SURGERY

## 2023-02-15 PROCEDURE — 80048 BASIC METABOLIC PNL TOTAL CA: CPT | Performed by: SURGERY

## 2023-02-15 PROCEDURE — 3077F PR MOST RECENT SYSTOLIC BLOOD PRESSURE >= 140 MM HG: ICD-10-PCS | Mod: CPTII,S$GLB,, | Performed by: SURGERY

## 2023-02-15 PROCEDURE — 84100 ASSAY OF PHOSPHORUS: CPT | Performed by: SURGERY

## 2023-02-15 PROCEDURE — 85025 COMPLETE CBC W/AUTO DIFF WBC: CPT | Performed by: SURGERY

## 2023-02-15 PROCEDURE — 83735 ASSAY OF MAGNESIUM: CPT | Performed by: SURGERY

## 2023-02-15 PROCEDURE — 85730 THROMBOPLASTIN TIME PARTIAL: CPT | Performed by: SURGERY

## 2023-02-15 PROCEDURE — 99215 OFFICE O/P EST HI 40 MIN: CPT | Mod: S$GLB,,, | Performed by: SURGERY

## 2023-02-15 PROCEDURE — 1159F PR MEDICATION LIST DOCUMENTED IN MEDICAL RECORD: ICD-10-PCS | Mod: CPTII,S$GLB,, | Performed by: SURGERY

## 2023-02-15 PROCEDURE — 3078F DIAST BP <80 MM HG: CPT | Mod: CPTII,S$GLB,, | Performed by: SURGERY

## 2023-02-15 PROCEDURE — 3077F SYST BP >= 140 MM HG: CPT | Mod: CPTII,S$GLB,, | Performed by: SURGERY

## 2023-02-15 PROCEDURE — 1125F AMNT PAIN NOTED PAIN PRSNT: CPT | Mod: CPTII,S$GLB,, | Performed by: SURGERY

## 2023-02-15 PROCEDURE — 3078F PR MOST RECENT DIASTOLIC BLOOD PRESSURE < 80 MM HG: ICD-10-PCS | Mod: CPTII,S$GLB,, | Performed by: SURGERY

## 2023-02-15 PROCEDURE — 36415 COLL VENOUS BLD VENIPUNCTURE: CPT | Performed by: SURGERY

## 2023-02-15 PROCEDURE — 99999 PR PBB SHADOW E&M-EST. PATIENT-LVL IV: CPT | Mod: PBBFAC,,, | Performed by: SURGERY

## 2023-02-15 PROCEDURE — 1159F MED LIST DOCD IN RCRD: CPT | Mod: CPTII,S$GLB,, | Performed by: SURGERY

## 2023-02-15 PROCEDURE — 1125F PR PAIN SEVERITY QUANTIFIED, PAIN PRESENT: ICD-10-PCS | Mod: CPTII,S$GLB,, | Performed by: SURGERY

## 2023-02-15 RX ORDER — ACETAMINOPHEN AND CODEINE PHOSPHATE 300; 30 MG/1; MG/1
1 TABLET ORAL EVERY 12 HOURS PRN
Qty: 20 TABLET | Refills: 0 | Status: SHIPPED | OUTPATIENT
Start: 2023-02-15 | End: 2023-02-25

## 2023-02-15 NOTE — H&P (VIEW-ONLY)
Danny Dunbar MD [facility]                         02/15/2023    HPI:  Nelsy Marino is a 82 y.o. female with   Patient Active Problem List   Diagnosis    Lichen sclerosus et atrophicus    PVD (peripheral vascular disease)    Essential hypertension    Type 2 diabetes mellitus    Diastolic CHF, chronic    Obesity (BMI 30-39.9)    History of DVT (deep vein thrombosis)    Wound dehiscence    Fluid collection at surgical site    Cellulitis    Postoperative seroma of musculoskeletal structure after non-musculoskeletal procedure    Abscess of right groin    being managed by PCP and specialists who is here today for evaluation of RLE pain.  She is s/p fem-fem approx 20 yrs ago without issues until recently.  Fem-fem that was performed for RLE ALI was noted to be occluded on US at [facility].  Was scheduled for an angiogram at [facility] although was unable to proceed due to insurance purposes.  Patient states location is R calf and thigh occurring for 2 mo.  Associated signs and symptoms include none.  Quality is throbbing and severity is 9/10 at worst.  Symptoms began 2 mo ago.  Alleviating factors include rest.  Worsening factors include ambulation.  Pt states RLE claudication x 1 block.  States it is lifestyle limiting.     no MI  no Stroke  Tobacco use: quit 35 yrs ago    1/14/19: cont to c/o RLE pain, 1/2 block calf claudication.  Intermittent rest pain, no tissue loss.  Compliant with Pletal, has not noticed significant improvement.    2/14/19: Cont to experience 1/2 block R calf claudication.  No wounds.      4/8/19: s/p R iliofemoral bypass 3/20/19.  C/o R groin inc dehiscence by LUCIUS RN.  No F/C.  States RLE ischemic rest pain resolved, +ambulating better without issues.    4/11/19: Presents for further eval of R abundio inc dehiscence.  No F/C.    4/29/19: No new issues, states took all of PO Abx earlier than rec duration of 2 weeks by ID, no F/C.  States wound  healing although has not used soap.  No RLE claudication or rest pain, no wounds to R foot.  States RLE pain resolved.    5/9/19:  On PO Abx.  Denies F/C.  Keeping clean with soap and water now.    5/16/19:  Doing well, keeping inc clean/dry, no complaints, no F/C.  Remains on Abx per ID recs.    5/23/19: No new issues, no F/C.  Remains on Abx.    6/6/19:  No new issues or complaints.  No F/C.  R foot paresthesias resolved.    7/2019:  Occasionally paresthesias R foot.  No claudication.  +ambulation.  No F/C.    10/2019:  No complaints today.  No signs of infection, no claudication.  Ambulating well.  Has resumed daily activities.    01/20/2021:  Patient without complaints today.  Ambulating without claudication.  Wounds have healed.  She is compliant with antibiotics and all of her other medications.    1/2023:  c/o poorly healing R great toenail s/p ingrown toenail surgery.  K[facility] has stopped her Abx and ASA.  C/o R foot pain.    2/2023:  CTA complete.  Wound care HH RN.  C/o R foot pain.      Past Medical History:   Diagnosis Date    CHF (congestive heart failure)     Diabetes mellitus     DVT (deep venous thrombosis)     Hypertension     Miscarriage      Past Surgical History:   Procedure Laterality Date     femoral vascular surgery       CREATION OF ILIOFEMORAL ARTERY BYPASS Right 3/20/2019    Procedure: CREATION, BYPASS, ARTERIAL, ILIAC TO FEMORAL, RIGHT LOWER EXTREMITY, RIGHT PROFUNDAPLASTY;  Surgeon: Danny Dunbar MD;  Location: [facility];  Service: Vascular;  Laterality: Right;  11:00AM START PER ANNE RICHARDS PREOP 3/13/2019  ACT'S, CELL SAVER, GRAFTS, BOOK WATER---NEED H/P  CONSENT IN AM------HGBA1C    HYSTERECTOMY      ?unsure cervix present    INCISION AND DRAINAGE Right 4/12/2019    Procedure: Incision and Drainage - R groin washout, possible muscle flap;  Surgeon: Danny Dunbar MD;  Location: [facility] OR [address] [place] [address];  Service: Vascular;  Laterality: Right;  groin washout    INCISION AND DRAINAGE  "OF GROIN Right 5/3/2019    Procedure: Incision and Drainage R groin;  Surgeon: Danny Dunbar MD;  Location: [facility] OR [address] [place] [address];  Service: Vascular;  Laterality: Right;     Family History   Problem Relation Age of Onset    Diabetes Father     Hypertension Father      Social History     Socioeconomic History    Marital status:    Tobacco Use    Smoking status: Former     Types: Cigarettes     Quit date:      Years since quittin.1    Smokeless tobacco: Never   Substance and Sexual Activity    Alcohol use: No    Drug use: No    Sexual activity: Never       Current Outpatient Medications:     amlodipine (NORVASC) 10 MG tablet, Take 10 mg by mouth once daily., Disp: , Rfl:     aspirin (ECOTRIN) 81 MG EC tablet, Take 81 mg by mouth once daily., Disp: , Rfl:     BLOOD SUGAR DIAGNOSTIC (TRUE METRIX GLUCOSE TEST STRIP MISC), by Misc.(Non-Drug; Combo Route) route., Disp: , Rfl:     cephALEXin (KEFLEX) 500 MG capsule, TAKE 1 CAPSULE BY MOUTH 4 TIMES DAILY, Disp: , Rfl: 6    glipiZIDE (GLUCOTROL) 10 MG tablet, Take 10 mg by mouth 2 (two) times daily before meals., Disp: , Rfl:     insulin aspart protamine-insulin aspart (NOVOLOG 70/30) 100 unit/mL (70-30) InPn pen, Inject 45 Units into the skin every morning. , Disp: , Rfl:     insulin aspart protamine-insulin aspart (NOVOLOG 70/30) 100 unit/mL (70-30) InPn pen, Inject 40 Units into the skin every evening., Disp: , Rfl:     insulin syringe-needle,dispos. 0.3 mL 30 gauge x 5/16" Syrg, by Misc.(Non-Drug; Combo Route) route., Disp: , Rfl:     losartan-hydrochlorothiazide 100-25 mg (HYZAAR) 100-25 mg per tablet, Take 1 tablet by mouth once daily., Disp: , Rfl:     metformin (GLUCOPHAGE) 1000 MG tablet, Take 1,000 mg by mouth 2 (two) times daily with meals., Disp: , Rfl:     ondansetron (ZOFRAN) 8 MG tablet, , Disp: , Rfl: 0    acetaminophen-codeine 300-30mg (TYLENOL #3) 300-30 mg Tab, Take 1 tablet by mouth every 12 (twelve) hours as needed (pain)., Disp: 20 " tablet, Rfl: 0    cilostazol (PLETAL) 50 MG Tab, Take 1 tablet (50 mg total) by mouth 2 (two) times daily. If patient tolerate medication after 4 weeks, increase dose to 100 mg twice daily., Disp: 60 tablet, Rfl: 11    REVIEW OF SYSTEMS:  General: No fevers or chills; ENT: No sore throat; Allergy and Immunology: no persistent infections; Hematological and Lymphatic: No history of bleeding or easy bruising; Endocrine: negative; Respiratory: no cough, shortness of breath, or wheezing; Cardiovascular: no chest pain or dyspnea on exertion; Gastrointestinal: no abdominal pain/back, change in bowel habits, or bloody stools; Genito-Urinary: no dysuria, trouble voiding, or hematuria; Musculoskeletal: negative; Neurological: no TIA or stroke symptoms; Psychiatric: no nervousness, anxiety or depression.    PHYSICAL EXAM:                General appearance:  Alert, well-appearing, and in no distress.  Oriented to person, place, and time                    Neurological: Normal speech, no focal findings noted; CN II - XII grossly intact. RLE with sensation to light touch, LLE with sensation to light touch.            Musculoskeletal: Digits/nail without cyanosis/clubbing.  Strength 5/5 BLE.                    Neck: Supple, no significant adenopathy                  Chest:  Clear to auscultation, no wheezes, rales or rhonchi, symmetric air entry. No use of accessory muscles               Cardiac: Normal rate and regular rhythm, S1 and S2 normal            Abdomen: Soft, nontender, nondistended, no masses or organomegaly, no hernia     No rebound tenderness noted; bowel sounds normal     R groin inc healed, no erythema      Extremities:   Unable to palpate R femoral pulse, 2+ L femoral pulse     doppler+ R popliteal pulse, doppler+ L popliteal pulse     doppler+ R PT pulse, doppler+ L PT pulse     2+ R DP pulse, doppler+ L DP pulse     no RLE edema, no LLE edema    Skin: RLE without tissue loss; LLE without tissue loss    LAB  RESULTS:  No results found for: CBC  Lab Results   Component Value Date    LABPROT 11.4 05/02/2019    INR 1.1 05/02/2019     Lab Results   Component Value Date     10/18/2019    K 4.3 10/18/2019     10/18/2019    CO2 25 10/18/2019    GLU 57 (L) 10/18/2019    BUN 24 (H) 10/18/2019    CREATININE 1.1 01/30/2023    CALCIUM 9.4 10/18/2019    ANIONGAP 9 10/18/2019    EGFRNONAA 48 (A) 10/18/2019     Lab Results   Component Value Date    WBC 6.48 05/04/2019    RBC 4.18 05/04/2019    HGB 10.2 (L) 05/04/2019    HCT 33.4 (L) 05/04/2019    MCV 80 (L) 05/04/2019    MCH 24.4 (L) 05/04/2019    MCHC 30.5 (L) 05/04/2019    RDW 14.5 05/04/2019     05/04/2019    MPV 10.1 05/04/2019    GRAN 4.3 05/04/2019    GRAN 65.8 05/04/2019    LYMPH 1.5 05/04/2019    LYMPH 22.8 05/04/2019    MONO 0.3 05/04/2019    MONO 4.5 05/04/2019    EOS 0.4 05/04/2019    BASO 0.03 05/04/2019    EOSINOPHIL 6.2 05/04/2019    BASOPHIL 0.5 05/04/2019    DIFFMETHOD Automated 05/04/2019     .  Lab Results   Component Value Date    HGBA1C 9.7 (H) 03/20/2019       IMAGING:  All pertinent imaging has been reviewed and interpreted independently.    11/8/18: US showing occluded fem-fem, decreased velocity R CFA, HD significant stenosis R SFA    [facility] ARCHIE: 0.4/1    1/14/19: ARCHIE R 0.4/1.1, flat L toe waveform, R toe pressure 64 mmHg    2/2019: Stress test without evidence of ischemia.  Echo with normal function.    4/24/19:   ARCHIE   1. Right lower extremity pressures and waveforms indicate mild arterial occlusive disease.  2. Left lower extremity pressures and waveforms indicate no hemodynamically significant arterial occlusive disease.    BLE arterial US  1. Right lower extremity arterial ultrasound and pressures indicate no hemodynamically significant stenosis.  The right iliac artery to femoral artery bypass is patent without evidence of hemodynamically significant stenosis.    2. Left lower extremity arterial ultrasound shows an occluded left external  iliac artery.  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    CTA runoff 4/24/2019:  Patient is postop right iliac to femoral artery bypass.  There is some soft tissue stranding along the iliac graft similar to slightly increased compared to prior.  May be some narrowing at the origin of the graft.  Dilatation of the insertion may be related to surgical technique or pseudoaneurysm.  Correlation with operative procedure will be helpful.  There is decrease in the volume of fluid though a focal 1 cm collection does persist.  There is some increased soft tissue thickening about the insertion site as well as the skin.  Skin staples remain.  Occluded fem-fem bypass graft.  Significant vascular disease throughout the SFA and calf vessels bilaterally similar.  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  Cholelithiasis.    CTA 6/24/19:  Patent right iliac to femoral bypass graft and occluded femoral-femoral bypass graft.  Mild aneurysmal dilatation at the distal anastomosis, unchanged.  Patent lower extremity arterial system with scattered moderate/ high-grade stenosis throughout, as above.    Arterial US 6/24/19:  1. Right lower extremity arterial ultrasound shows a patent iliac to femoral artery bypass with distal anastomosis hemodynamically significant stenosis.  There is mid SFA stenosis.  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  Pressures indicate mild arterial occlusive disease.    ARCHIE 6/24/19:  1. Right lower extremity pressures and waveforms indicate mild arterial occlusive disease.  2. Left lower extremity pressures and waveforms indicate no hemodynamically significant arterial occlusive disease.  Ankle pressures are non-compressible, suggesting possible medial calcinosis.    BLE arterial US 10/2019:  1. Right lower extremity arterial ultrasound shows no hemodynamically significant stenosis in the iliofemoral bypass.  There  is hemodynamically significant stenosis in the mid SFA.  Right femoral anastomosis measures 1.7 x 1.6 cm.  Pressures indicate mild arterial occlusive disease.  2. Left lower extremity arterial ultrasound shows an occluded femoral-femoral bypass. Pressures indicate mild arterial occlusive disease.    11/2020:  1. Right lower extremity arterial ultrasound shows a patent right iliac to femoral bypass graft with approximately 50% stenosis of the distal anastomosis.  There is hemodynamically significant stenosis of the mid SFA.  Pressures indicate no arterial occlusive disease.  The femoral anastomosis measures 1.8 x 1.6 x 1.7 cm.    CTA 2/2023:  R iliofemoral bypass occluded.    IMP/PLAN:  82 y.o. female with   Patient Active Problem List   Diagnosis    Lichen sclerosus et atrophicus    PVD (peripheral vascular disease)    Essential hypertension    Type 2 diabetes mellitus    Diastolic CHF, chronic    Obesity (BMI 30-39.9)    History of DVT (deep vein thrombosis)    Wound dehiscence    Fluid collection at surgical site    Cellulitis    Postoperative seroma of musculoskeletal structure after non-musculoskeletal procedure    Abscess of right groin    being managed by PCP and specialists who is here today for evaluation of RLE claudication, lifestyle limiting s/p R iliofemoral bypass 3/2019 with groin incision dehisence and superficial infection / deep seroma with graft occlusion on CTA 2/2023, symptoms of graft occlusion 2022    -Recovering well s/p R iliofemoral bypass s/p wound washout and drainage of seroma with healing skin dehiscence of R groin inc at superior and mid aspect, no graft exposure or fascial dehiscence, 2+ DP with resolution of claudication and ischemic rest pain, now with healing wounds s/p R groin I&D of distal inc superficial skin infection and removal of embedded staple with improvement in wound and no further infection with wound healed now and graft patent with ARCHIE 0.7/0.9 with resolved  claudication/rest pain with decreased R femoral pulse and concern for bypass occlusion with poorly healing R great toe -Rec RLE angiogram diagnostic - plan for possible R axillary - profunda bypass for limb salvage  -Cont ASA and Pletal, and Lipitor  -Continue heart healthy lifestyle  -Cont Abx per ID recs for suppressive therapy   -Cont smoking cessation      I spent 11 minutes evaluating this patient and greater than 50% of the time was spent counseling, coordinator care and discussing the plan of care.  All questions were answered and patient stated understanding with agreement with the above treatment plan.    Danny Dunbar MD [facility]  Vascular and Endovascular Surgery

## 2023-02-15 NOTE — PROGRESS NOTES
Danny Dunbar MD [facility]                         02/15/2023    HPI:  Nelsy Marino is a 82 y.o. female with   Patient Active Problem List   Diagnosis    Lichen sclerosus et atrophicus    PVD (peripheral vascular disease)    Essential hypertension    Type 2 diabetes mellitus    Diastolic CHF, chronic    Obesity (BMI 30-39.9)    History of DVT (deep vein thrombosis)    Wound dehiscence    Fluid collection at surgical site    Cellulitis    Postoperative seroma of musculoskeletal structure after non-musculoskeletal procedure    Abscess of right groin    being managed by PCP and specialists who is here today for evaluation of RLE pain.  She is s/p fem-fem approx 20 yrs ago without issues until recently.  Fem-fem that was performed for RLE ALI was noted to be occluded on US at [facility].  Was scheduled for an angiogram at [facility] although was unable to proceed due to insurance purposes.  Patient states location is R calf and thigh occurring for 2 mo.  Associated signs and symptoms include none.  Quality is throbbing and severity is 9/10 at worst.  Symptoms began 2 mo ago.  Alleviating factors include rest.  Worsening factors include ambulation.  Pt states RLE claudication x 1 block.  States it is lifestyle limiting.     no MI  no Stroke  Tobacco use: quit 35 yrs ago    1/14/19: cont to c/o RLE pain, 1/2 block calf claudication.  Intermittent rest pain, no tissue loss.  Compliant with Pletal, has not noticed significant improvement.    2/14/19: Cont to experience 1/2 block R calf claudication.  No wounds.      4/8/19: s/p R iliofemoral bypass 3/20/19.  C/o R groin inc dehiscence by LUCIUS RN.  No F/C.  States RLE ischemic rest pain resolved, +ambulating better without issues.    4/11/19: Presents for further eval of R abundio inc dehiscence.  No F/C.    4/29/19: No new issues, states took all of PO Abx earlier than rec duration of 2 weeks by ID, no F/C.  States wound  healing although has not used soap.  No RLE claudication or rest pain, no wounds to R foot.  States RLE pain resolved.    5/9/19:  On PO Abx.  Denies F/C.  Keeping clean with soap and water now.    5/16/19:  Doing well, keeping inc clean/dry, no complaints, no F/C.  Remains on Abx per ID recs.    5/23/19: No new issues, no F/C.  Remains on Abx.    6/6/19:  No new issues or complaints.  No F/C.  R foot paresthesias resolved.    7/2019:  Occasionally paresthesias R foot.  No claudication.  +ambulation.  No F/C.    10/2019:  No complaints today.  No signs of infection, no claudication.  Ambulating well.  Has resumed daily activities.    01/20/2021:  Patient without complaints today.  Ambulating without claudication.  Wounds have healed.  She is compliant with antibiotics and all of her other medications.    1/2023:  c/o poorly healing R great toenail s/p ingrown toenail surgery.  Kiz[facility] has stopped her Abx and ASA.  C/o R foot pain.    2/2023:  CTA complete.  Wound care HH RN.  C/o R foot pain.      Past Medical History:   Diagnosis Date    CHF (congestive heart failure)     Diabetes mellitus     DVT (deep venous thrombosis)     Hypertension     Miscarriage      Past Surgical History:   Procedure Laterality Date     femoral vascular surgery       CREATION OF ILIOFEMORAL ARTERY BYPASS Right 3/20/2019    Procedure: CREATION, BYPASS, ARTERIAL, ILIAC TO FEMORAL, RIGHT LOWER EXTREMITY, RIGHT PROFUNDAPLASTY;  Surgeon: Danny Dunbar MD;  Location: [facility];  Service: Vascular;  Laterality: Right;  11:00AM START PER ANNE RICHARDS PREOP 3/13/2019  ACT'S, CELL SAVER, GRAFTS, BOOK WATER---NEED H/P  CONSENT IN AM------HGBA1C    HYSTERECTOMY      ?unsure cervix present    INCISION AND DRAINAGE Right 4/12/2019    Procedure: Incision and Drainage - R groin washout, possible muscle flap;  Surgeon: Danny Dunbar MD;  Location: [facility] OR [address] [place] [address];  Service: Vascular;  Laterality: Right;  groin washout    INCISION AND DRAINAGE  "OF GROIN Right 5/3/2019    Procedure: Incision and Drainage R groin;  Surgeon: Danny Dunbar MD;  Location: [facility] OR [address] [place] [address];  Service: Vascular;  Laterality: Right;     Family History   Problem Relation Age of Onset    Diabetes Father     Hypertension Father      Social History     Socioeconomic History    Marital status:    Tobacco Use    Smoking status: Former     Types: Cigarettes     Quit date:      Years since quittin.1    Smokeless tobacco: Never   Substance and Sexual Activity    Alcohol use: No    Drug use: No    Sexual activity: Never       Current Outpatient Medications:     amlodipine (NORVASC) 10 MG tablet, Take 10 mg by mouth once daily., Disp: , Rfl:     aspirin (ECOTRIN) 81 MG EC tablet, Take 81 mg by mouth once daily., Disp: , Rfl:     BLOOD SUGAR DIAGNOSTIC (TRUE METRIX GLUCOSE TEST STRIP MISC), by Misc.(Non-Drug; Combo Route) route., Disp: , Rfl:     cephALEXin (KEFLEX) 500 MG capsule, TAKE 1 CAPSULE BY MOUTH 4 TIMES DAILY, Disp: , Rfl: 6    glipiZIDE (GLUCOTROL) 10 MG tablet, Take 10 mg by mouth 2 (two) times daily before meals., Disp: , Rfl:     insulin aspart protamine-insulin aspart (NOVOLOG 70/30) 100 unit/mL (70-30) InPn pen, Inject 45 Units into the skin every morning. , Disp: , Rfl:     insulin aspart protamine-insulin aspart (NOVOLOG 70/30) 100 unit/mL (70-30) InPn pen, Inject 40 Units into the skin every evening., Disp: , Rfl:     insulin syringe-needle,dispos. 0.3 mL 30 gauge x 5/16" Syrg, by Misc.(Non-Drug; Combo Route) route., Disp: , Rfl:     losartan-hydrochlorothiazide 100-25 mg (HYZAAR) 100-25 mg per tablet, Take 1 tablet by mouth once daily., Disp: , Rfl:     metformin (GLUCOPHAGE) 1000 MG tablet, Take 1,000 mg by mouth 2 (two) times daily with meals., Disp: , Rfl:     ondansetron (ZOFRAN) 8 MG tablet, , Disp: , Rfl: 0    acetaminophen-codeine 300-30mg (TYLENOL #3) 300-30 mg Tab, Take 1 tablet by mouth every 12 (twelve) hours as needed (pain)., Disp: 20 " tablet, Rfl: 0    cilostazol (PLETAL) 50 MG Tab, Take 1 tablet (50 mg total) by mouth 2 (two) times daily. If patient tolerate medication after 4 weeks, increase dose to 100 mg twice daily., Disp: 60 tablet, Rfl: 11    REVIEW OF SYSTEMS:  General: No fevers or chills; ENT: No sore throat; Allergy and Immunology: no persistent infections; Hematological and Lymphatic: No history of bleeding or easy bruising; Endocrine: negative; Respiratory: no cough, shortness of breath, or wheezing; Cardiovascular: no chest pain or dyspnea on exertion; Gastrointestinal: no abdominal pain/back, change in bowel habits, or bloody stools; Genito-Urinary: no dysuria, trouble voiding, or hematuria; Musculoskeletal: negative; Neurological: no TIA or stroke symptoms; Psychiatric: no nervousness, anxiety or depression.    PHYSICAL EXAM:                General appearance:  Alert, well-appearing, and in no distress.  Oriented to person, place, and time                    Neurological: Normal speech, no focal findings noted; CN II - XII grossly intact. RLE with sensation to light touch, LLE with sensation to light touch.            Musculoskeletal: Digits/nail without cyanosis/clubbing.  Strength 5/5 BLE.                    Neck: Supple, no significant adenopathy                  Chest:  Clear to auscultation, no wheezes, rales or rhonchi, symmetric air entry. No use of accessory muscles               Cardiac: Normal rate and regular rhythm, S1 and S2 normal            Abdomen: Soft, nontender, nondistended, no masses or organomegaly, no hernia     No rebound tenderness noted; bowel sounds normal     R groin inc healed, no erythema      Extremities:   Unable to palpate R femoral pulse, 2+ L femoral pulse     doppler+ R popliteal pulse, doppler+ L popliteal pulse     doppler+ R PT pulse, doppler+ L PT pulse     2+ R DP pulse, doppler+ L DP pulse     no RLE edema, no LLE edema    Skin: RLE without tissue loss; LLE without tissue loss    LAB  RESULTS:  No results found for: CBC  Lab Results   Component Value Date    LABPROT 11.4 05/02/2019    INR 1.1 05/02/2019     Lab Results   Component Value Date     10/18/2019    K 4.3 10/18/2019     10/18/2019    CO2 25 10/18/2019    GLU 57 (L) 10/18/2019    BUN 24 (H) 10/18/2019    CREATININE 1.1 01/30/2023    CALCIUM 9.4 10/18/2019    ANIONGAP 9 10/18/2019    EGFRNONAA 48 (A) 10/18/2019     Lab Results   Component Value Date    WBC 6.48 05/04/2019    RBC 4.18 05/04/2019    HGB 10.2 (L) 05/04/2019    HCT 33.4 (L) 05/04/2019    MCV 80 (L) 05/04/2019    MCH 24.4 (L) 05/04/2019    MCHC 30.5 (L) 05/04/2019    RDW 14.5 05/04/2019     05/04/2019    MPV 10.1 05/04/2019    GRAN 4.3 05/04/2019    GRAN 65.8 05/04/2019    LYMPH 1.5 05/04/2019    LYMPH 22.8 05/04/2019    MONO 0.3 05/04/2019    MONO 4.5 05/04/2019    EOS 0.4 05/04/2019    BASO 0.03 05/04/2019    EOSINOPHIL 6.2 05/04/2019    BASOPHIL 0.5 05/04/2019    DIFFMETHOD Automated 05/04/2019     .  Lab Results   Component Value Date    HGBA1C 9.7 (H) 03/20/2019       IMAGING:  All pertinent imaging has been reviewed and interpreted independently.    11/8/18: US showing occluded fem-fem, decreased velocity R CFA, HD significant stenosis R SFA    [facility] ARCHIE: 0.4/1    1/14/19: ARCHIE R 0.4/1.1, flat L toe waveform, R toe pressure 64 mmHg    2/2019: Stress test without evidence of ischemia.  Echo with normal function.    4/24/19:   ARCHIE   1. Right lower extremity pressures and waveforms indicate mild arterial occlusive disease.  2. Left lower extremity pressures and waveforms indicate no hemodynamically significant arterial occlusive disease.    BLE arterial US  1. Right lower extremity arterial ultrasound and pressures indicate no hemodynamically significant stenosis.  The right iliac artery to femoral artery bypass is patent without evidence of hemodynamically significant stenosis.    2. Left lower extremity arterial ultrasound shows an occluded left external  iliac artery.  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    CTA runoff 4/24/2019:  Patient is postop right iliac to femoral artery bypass.  There is some soft tissue stranding along the iliac graft similar to slightly increased compared to prior.  May be some narrowing at the origin of the graft.  Dilatation of the insertion may be related to surgical technique or pseudoaneurysm.  Correlation with operative procedure will be helpful.  There is decrease in the volume of fluid though a focal 1 cm collection does persist.  There is some increased soft tissue thickening about the insertion site as well as the skin.  Skin staples remain.  Occluded fem-fem bypass graft.  Significant vascular disease throughout the SFA and calf vessels bilaterally similar.  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  Cholelithiasis.    CTA 6/24/19:  Patent right iliac to femoral bypass graft and occluded femoral-femoral bypass graft.  Mild aneurysmal dilatation at the distal anastomosis, unchanged.  Patent lower extremity arterial system with scattered moderate/ high-grade stenosis throughout, as above.    Arterial US 6/24/19:  1. Right lower extremity arterial ultrasound shows a patent iliac to femoral artery bypass with distal anastomosis hemodynamically significant stenosis.  There is mid SFA stenosis.  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  Pressures indicate mild arterial occlusive disease.    ARCHIE 6/24/19:  1. Right lower extremity pressures and waveforms indicate mild arterial occlusive disease.  2. Left lower extremity pressures and waveforms indicate no hemodynamically significant arterial occlusive disease.  Ankle pressures are non-compressible, suggesting possible medial calcinosis.    BLE arterial US 10/2019:  1. Right lower extremity arterial ultrasound shows no hemodynamically significant stenosis in the iliofemoral bypass.  There  is hemodynamically significant stenosis in the mid SFA.  Right femoral anastomosis measures 1.7 x 1.6 cm.  Pressures indicate mild arterial occlusive disease.  2. Left lower extremity arterial ultrasound shows an occluded femoral-femoral bypass. Pressures indicate mild arterial occlusive disease.    11/2020:  1. Right lower extremity arterial ultrasound shows a patent right iliac to femoral bypass graft with approximately 50% stenosis of the distal anastomosis.  There is hemodynamically significant stenosis of the mid SFA.  Pressures indicate no arterial occlusive disease.  The femoral anastomosis measures 1.8 x 1.6 x 1.7 cm.    CTA 2/2023:  R iliofemoral bypass occluded.    IMP/PLAN:  82 y.o. female with   Patient Active Problem List   Diagnosis    Lichen sclerosus et atrophicus    PVD (peripheral vascular disease)    Essential hypertension    Type 2 diabetes mellitus    Diastolic CHF, chronic    Obesity (BMI 30-39.9)    History of DVT (deep vein thrombosis)    Wound dehiscence    Fluid collection at surgical site    Cellulitis    Postoperative seroma of musculoskeletal structure after non-musculoskeletal procedure    Abscess of right groin    being managed by PCP and specialists who is here today for evaluation of RLE claudication, lifestyle limiting s/p R iliofemoral bypass 3/2019 with groin incision dehisence and superficial infection / deep seroma with graft occlusion on CTA 2/2023, symptoms of graft occlusion 2022    -Recovering well s/p R iliofemoral bypass s/p wound washout and drainage of seroma with healing skin dehiscence of R groin inc at superior and mid aspect, no graft exposure or fascial dehiscence, 2+ DP with resolution of claudication and ischemic rest pain, now with healing wounds s/p R groin I&D of distal inc superficial skin infection and removal of embedded staple with improvement in wound and no further infection with wound healed now and graft patent with ARCHIE 0.7/0.9 with resolved  claudication/rest pain with decreased R femoral pulse and concern for bypass occlusion with poorly healing R great toe -Rec RLE angiogram diagnostic - plan for possible R axillary - profunda bypass for limb salvage  -Cont ASA and Pletal, and Lipitor  -Continue heart healthy lifestyle  -Cont Abx per ID recs for suppressive therapy   -Cont smoking cessation      I spent 11 minutes evaluating this patient and greater than 50% of the time was spent counseling, coordinator care and discussing the plan of care.  All questions were answered and patient stated understanding with agreement with the above treatment plan.    Danny Dunbar MD [facility]  Vascular and Endovascular Surgery

## 2023-02-20 ENCOUNTER — HOSPITAL ENCOUNTER (OUTPATIENT)
Dept: PREADMISSION TESTING | Facility: HOSPITAL | Age: 83
Discharge: HOME | End: 2023-02-20
Attending: SURGERY
Payer: MEDICARE

## 2023-02-20 VITALS — WEIGHT: 163 LBS | HEIGHT: 60 IN | BODY MASS INDEX: 32 KG/M2

## 2023-02-20 RX ORDER — CARVEDILOL 12.5 MG/1
12.5 TABLET ORAL 2 TIMES DAILY
Status: ON HOLD | COMMUNITY
Start: 2023-01-28 | End: 2023-05-03 | Stop reason: HOSPADM

## 2023-02-20 NOTE — PLAN OF CARE
Pre-operative instructions, medication directives and pain scales reviewed with patient. All questions the patient had were answered. Re-assurance about surgical procedure and day of surgery routine given as needed, patient verbalized understanding of the pre-op instructions.     Phone preop done.

## 2023-02-23 ENCOUNTER — HOSPITAL ENCOUNTER (OUTPATIENT)
Facility: HOSPITAL | Age: 83
Discharge: HOME | End: 2023-02-23
Attending: SURGERY | Admitting: SURGERY
Payer: MEDICARE

## 2023-02-23 VITALS
SYSTOLIC BLOOD PRESSURE: 137 MMHG | HEART RATE: 93 BPM | BODY MASS INDEX: 31.83 KG/M2 | RESPIRATION RATE: 18 BRPM | OXYGEN SATURATION: 95 % | DIASTOLIC BLOOD PRESSURE: 63 MMHG | TEMPERATURE: 98 F | WEIGHT: 163 LBS

## 2023-02-23 DIAGNOSIS — Z01.818 PREOPERATIVE TESTING: Primary | ICD-10-CM

## 2023-02-23 DIAGNOSIS — I70.201 ATHEROSCLEROSIS OF ARTERY OF RIGHT LOWER EXTREMITY: ICD-10-CM

## 2023-02-23 DIAGNOSIS — I73.9 PERIPHERAL VASCULAR DISEASE, UNSPECIFIED: ICD-10-CM

## 2023-02-23 LAB — POCT GLUCOSE: 106 MG/DL (ref 70–110)

## 2023-02-23 PROCEDURE — 25000003 PHARM REV CODE 250: Performed by: SURGERY

## 2023-02-23 PROCEDURE — 25500020 PHARM REV CODE 255: Performed by: SURGERY

## 2023-02-23 PROCEDURE — 63600175 PHARM REV CODE 636 W HCPCS: Performed by: SURGERY

## 2023-02-23 PROCEDURE — 82962 GLUCOSE BLOOD TEST: CPT | Performed by: SURGERY

## 2023-02-23 PROCEDURE — 36200 PLACE CATHETER IN AORTA: CPT | Performed by: SURGERY

## 2023-02-23 PROCEDURE — 75710 ARTERY X-RAYS ARM/LEG: CPT | Mod: 26,,, | Performed by: SURGERY

## 2023-02-23 PROCEDURE — 36200 PLACE CATHETER IN AORTA: CPT | Mod: ,,, | Performed by: SURGERY

## 2023-02-23 PROCEDURE — 75710 ARTERY X-RAYS ARM/LEG: CPT | Performed by: SURGERY

## 2023-02-23 PROCEDURE — 25000003 PHARM REV CODE 250: Performed by: NURSE PRACTITIONER

## 2023-02-23 PROCEDURE — 75710 PR  ANGIO EXTREMITY UNILAT: ICD-10-PCS | Mod: 26,,, | Performed by: SURGERY

## 2023-02-23 PROCEDURE — 99152 MOD SED SAME PHYS/QHP 5/>YRS: CPT | Mod: ,,, | Performed by: SURGERY

## 2023-02-23 PROCEDURE — 99152 PR MOD CONSCIOUS SEDATION, SAME PHYS, 5+ YRS, FIRST 15 MIN: ICD-10-PCS | Mod: ,,, | Performed by: SURGERY

## 2023-02-23 PROCEDURE — 36200 PR PLACE CATH AORTA: ICD-10-PCS | Mod: ,,, | Performed by: SURGERY

## 2023-02-23 RX ORDER — LABETALOL HYDROCHLORIDE 5 MG/ML
INJECTION, SOLUTION INTRAVENOUS
Status: COMPLETED | OUTPATIENT
Start: 2023-02-23 | End: 2023-02-23

## 2023-02-23 RX ORDER — CEFAZOLIN SODIUM 2 G/50ML
2 SOLUTION INTRAVENOUS ONCE
Status: COMPLETED | OUTPATIENT
Start: 2023-02-23 | End: 2023-02-23

## 2023-02-23 RX ORDER — CEFAZOLIN SODIUM 1 G/50ML
SOLUTION INTRAVENOUS
Status: COMPLETED | OUTPATIENT
Start: 2023-02-23 | End: 2023-02-23

## 2023-02-23 RX ORDER — OXYCODONE AND ACETAMINOPHEN 5; 325 MG/1; MG/1
1 TABLET ORAL ONCE
Status: COMPLETED | OUTPATIENT
Start: 2023-02-23 | End: 2023-02-23

## 2023-02-23 RX ORDER — ACETAMINOPHEN 325 MG/1
650 TABLET ORAL
Status: DISCONTINUED | OUTPATIENT
Start: 2023-02-23 | End: 2023-02-23 | Stop reason: HOSPADM

## 2023-02-23 RX ORDER — FENTANYL CITRATE 50 UG/ML
INJECTION, SOLUTION INTRAMUSCULAR; INTRAVENOUS
Status: COMPLETED | OUTPATIENT
Start: 2023-02-23 | End: 2023-02-23

## 2023-02-23 RX ORDER — LIDOCAINE HYDROCHLORIDE 10 MG/ML
INJECTION INFILTRATION; PERINEURAL
Status: COMPLETED | OUTPATIENT
Start: 2023-02-23 | End: 2023-02-23

## 2023-02-23 RX ORDER — MIDAZOLAM HYDROCHLORIDE 1 MG/ML
INJECTION INTRAMUSCULAR; INTRAVENOUS
Status: COMPLETED | OUTPATIENT
Start: 2023-02-23 | End: 2023-02-23

## 2023-02-23 RX ORDER — SODIUM CHLORIDE 9 MG/ML
INJECTION, SOLUTION INTRAVENOUS
Status: COMPLETED | OUTPATIENT
Start: 2023-02-23 | End: 2023-02-23

## 2023-02-23 RX ADMIN — LIDOCAINE HYDROCHLORIDE 5 ML: 10 INJECTION, SOLUTION INFILTRATION; PERINEURAL at 08:02

## 2023-02-23 RX ADMIN — MIDAZOLAM HYDROCHLORIDE 0.5 MG: 1 INJECTION INTRAMUSCULAR; INTRAVENOUS at 07:02

## 2023-02-23 RX ADMIN — FENTANYL CITRATE 50 MCG: 50 INJECTION, SOLUTION INTRAMUSCULAR; INTRAVENOUS at 08:02

## 2023-02-23 RX ADMIN — LABETALOL HYDROCHLORIDE 10 MG: 5 INJECTION, SOLUTION INTRAVENOUS at 08:02

## 2023-02-23 RX ADMIN — FENTANYL CITRATE 50 MCG: 50 INJECTION, SOLUTION INTRAMUSCULAR; INTRAVENOUS at 07:02

## 2023-02-23 RX ADMIN — SODIUM CHLORIDE 50 ML/HR: 0.9 INJECTION, SOLUTION INTRAVENOUS at 07:02

## 2023-02-23 RX ADMIN — IOHEXOL 74 ML: 300 INJECTION, SOLUTION INTRAVENOUS at 08:02

## 2023-02-23 RX ADMIN — CEFAZOLIN SODIUM 2 G: 1 SOLUTION INTRAVENOUS at 07:02

## 2023-02-23 RX ADMIN — OXYCODONE AND ACETAMINOPHEN 1 TABLET: 5; 325 TABLET ORAL at 09:02

## 2023-02-23 RX ADMIN — LIDOCAINE HYDROCHLORIDE 3 ML: 10 INJECTION, SOLUTION INFILTRATION; PERINEURAL at 07:02

## 2023-02-23 NOTE — PLAN OF CARE
Message to Dr Tran that patient needs a consent for correct procedure and that she did not take her aspirin .

## 2023-02-23 NOTE — OP NOTE
Date: 2/23/23     Surgeon: Danny Dunbar MD [facility]     Assistant: None     Pre-op Diagnosis:   1. 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   Patient Active Problem List    Diagnosis Date Noted    Abscess of right groin     Postoperative seroma of musculoskeletal structure after non-musculoskeletal procedure 04/16/2019    Fluid collection at surgical site     Cellulitis     Wound dehiscence 04/11/2019    PVD (peripheral vascular disease) 03/20/2019    Essential hypertension 03/20/2019    Type 2 diabetes mellitus 03/20/2019    Diastolic CHF, chronic 03/20/2019    Obesity (BMI 30-39.9) 03/20/2019    History of DVT (deep vein thrombosis) 03/20/2019    Lichen sclerosus et atrophicus 01/22/2018       Post-op Diagnosis: Same     Procedures:   1. US guided L CFA percutaneous access  2. L femoral angiogram  3. Aortogram  4. Moderate sedation  5. R femoral angiogram     Anesthesia: Local     EBL: Minimal     Findings: Successful revascularization / resolution of stenosis     Complications:  None; patient tolerated the procedure well.     Disposition: Recovery- hemodynamically stable in good condition     Attending Attestation: I was present and scrubbed for the entire procedure.  After an informed consent was obtained the patient was brought to the interventional suite and placed in the supine position. Both the patient and procedure were confirmed and identified during timeout process. The patient received perioperative antibiotics. The patient's bilateral groins were prepped and draped in usual sterile fashion. Using ultrasound guidance, the L common femoral artery vessel patency was confirmed. Then direct ultrasound guidance the L CFA was entered with a 21-G needle, Mandril wire and 4-Fr micropuncture needle. Then under fluoroscopic guidance, an 0.035-in wire was placed into the distal aorta. The micropuncture dilator was then exchanged over the wire for a 6-Romansh sheath. Sheathogram  demonstrated access in the CFA. Next a VCF-catheter was placed over the wire and into the distal aorta under fluoroscopy and an aortogram with R leg angiogram was performed which demonstrated the above findings.    Based on the images from this diagnostic angio, a decision was made not to intervene.    We then deployed 5fr Mynx closure device with issue and it was removed. At the conclusion of the case the patient was noted to have no evidence of a groin hematoma. All instrument and sponge counts were correct at the end of the case. The patient tolerated the procedure well and was transferred to the pacu for further recovery.      MODERATE SEDATION   I was present for moderate sedation and monitored the patients cardio respiratory functions during the procedure. See nurses notes for Intra-service start and end times and for the log of the medications, doseage and route.     Time of sedation:  50 mins.

## 2023-02-23 NOTE — DISCHARGE INSTRUCTIONS
Drink plenty of fluids for the next 48 hours and follow your doctor's diet orders.  Rest for the next 72 hours. Try not to keep the injected leg bent for a long period of time.  Remove the dressing in 24 hours, and you may shower. Clean the area with soap and water, and apply a band aid for the  next 5 days.        No Lifting over 5-10 lbs., that is, not more than 1 gallon of water, or straining for 72 hours.    No driving, no drinking alcohol, and no signing legal documents for the next 24 hours.    Call your doctor for elevated temperature, shortness of breath, chest pain, or cold discolored leg.   If oozing occurs at the injections site, lie down. Apply pressure with a clean wash cloth for 20 to 30 minutes and call  your doctor.  If severe bleeding occurs, lie down, apply pressure. Call 911 and request an ambulance to take you to the nearest  hospital emergency room.          Fall Prevention  Millions of people fall every year and injure themselves. You may have had anesthesia or sedation which may increase your risk of falling. You may have health issues that put you at an increased risk of falling.     Here are ways to reduce your risk of falling.    Make your home safe by keeping walkways clear of objects you may trip over.  Use non-slip pads under rugs. Do not use area rugs or small throw rugs.  Use non-slip mats in bathtubs and showers.  Install handrails and lights on staircases.  Do not walk in poorly lit areas.  Do not stand on chairs or wobbly ladders.  Use caution when reaching overhead or looking upward. This position can cause a loss of balance.  Be sure your shoes fit properly, have non-slip bottoms and are in good condition.   Wear shoes both inside and out. Avoid going barefoot or wearing slippers.  Be cautious when going up and down stairs, curbs, and when walking on uneven sidewalks.  If your balance is poor, consider using a cane or walker.  If your fall was related to alcohol use, stop or limit  alcohol intake.   If your fall was related to use of sleeping medicines, talk to your doctor about this. You may need to reduce your dosage at bedtime if you awaken during the night to go to the bathroom.    To reduce the need for nighttime bathroom trips:  Avoid drinking fluids for several hours before going to bed  Empty your bladder before going to bed  Men can keep a urinal at the bedside  Stay as active as you can. Balance, flexibility, strength, and endurance all come from exercise. They all play a role in preventing falls. Ask your healthcare provider which types of activity are right for you.  Get your vision checked on a regular basis.  If you have pets, know where they are before you stand up or walk so you don't trip over them.  Use night lights.

## 2023-02-23 NOTE — SEDATION DOCUMENTATION
Physician attempted vascular closure device but failed to deploy.  Manual pressure held at left groin.  Report to Gamaliel in SDS.  Patient pain not well controlled during procedure with continued complaints of foot and toe pain on right.

## 2023-02-23 NOTE — INTERVAL H&P NOTE
The patient has been examined and the H&P has been reviewed:    I concur with the findings and changes have been noted since the H&P was written: Pt presents today for RLE angiogram diagnostic and possiblr R axillary - profunda bypass for limb salvage.  Dopplerable pulses.     Procedure risks, benefits and alternative options discussed and understood by patient/family.    Mallampati Scale Class II   ASA Classification Class III          There are no hospital problems to display for this patient.

## 2023-02-27 ENCOUNTER — HOSPITAL ENCOUNTER (INPATIENT)
Facility: HOSPITAL | Age: 83
LOS: 21 days | Discharge: HOME | DRG: 853 | End: 2023-03-20
Attending: EMERGENCY MEDICINE | Admitting: HOSPITALIST
Payer: MEDICARE

## 2023-02-27 DIAGNOSIS — E11.65 TYPE 2 DIABETES MELLITUS WITH HYPERGLYCEMIA, WITH LONG-TERM CURRENT USE OF INSULIN: ICD-10-CM

## 2023-02-27 DIAGNOSIS — E11.628 DIABETIC FOOT INFECTION: ICD-10-CM

## 2023-02-27 DIAGNOSIS — Z79.4 TYPE 2 DIABETES MELLITUS WITH HYPERGLYCEMIA, WITH LONG-TERM CURRENT USE OF INSULIN: ICD-10-CM

## 2023-02-27 DIAGNOSIS — J96.11 CHRONIC RESPIRATORY FAILURE WITH HYPOXIA: ICD-10-CM

## 2023-02-27 DIAGNOSIS — Z01.818 PREOPERATIVE CLEARANCE: ICD-10-CM

## 2023-02-27 DIAGNOSIS — I70.229 CRITICAL LOWER LIMB ISCHEMIA: ICD-10-CM

## 2023-02-27 DIAGNOSIS — L08.9 DIABETIC FOOT INFECTION: ICD-10-CM

## 2023-02-27 DIAGNOSIS — I96 WET GANGRENE: ICD-10-CM

## 2023-02-27 DIAGNOSIS — I82.409 DVT (DEEP VENOUS THROMBOSIS): ICD-10-CM

## 2023-02-27 DIAGNOSIS — I96 DRY GANGRENE: ICD-10-CM

## 2023-02-27 DIAGNOSIS — T14.8XXA WOUND INFECTION: ICD-10-CM

## 2023-02-27 DIAGNOSIS — I50.9 ACUTE CONGESTIVE HEART FAILURE: ICD-10-CM

## 2023-02-27 DIAGNOSIS — Z98.890 CRITICAL LIMB ISCHEMIA WITH HISTORY OF REVASCULARIZATION OF SAME EXTREMITY: Primary | ICD-10-CM

## 2023-02-27 DIAGNOSIS — R07.9 CHEST PAIN: ICD-10-CM

## 2023-02-27 DIAGNOSIS — Z01.810 PREOP CARDIOVASCULAR EXAM: ICD-10-CM

## 2023-02-27 DIAGNOSIS — K92.1 MELENA: ICD-10-CM

## 2023-02-27 DIAGNOSIS — Z01.818 PREOP TESTING: ICD-10-CM

## 2023-02-27 DIAGNOSIS — L08.9 WOUND INFECTION: ICD-10-CM

## 2023-02-27 DIAGNOSIS — I70.229 CRITICAL LIMB ISCHEMIA WITH HISTORY OF REVASCULARIZATION OF SAME EXTREMITY: Primary | ICD-10-CM

## 2023-02-27 DIAGNOSIS — M79.671 RIGHT FOOT PAIN: ICD-10-CM

## 2023-02-27 DIAGNOSIS — I73.9 PVD (PERIPHERAL VASCULAR DISEASE): Chronic | ICD-10-CM

## 2023-02-27 PROBLEM — A41.9 SEPSIS: Status: ACTIVE | Noted: 2023-02-27

## 2023-02-27 LAB
ABO + RH BLD: ABNORMAL
ALBUMIN SERPL BCP-MCNC: 2.9 G/DL (ref 3.5–5.2)
ALP SERPL-CCNC: 66 U/L (ref 55–135)
ALT SERPL W/O P-5'-P-CCNC: 9 U/L (ref 10–44)
ANION GAP SERPL CALC-SCNC: 9 MMOL/L (ref 8–16)
APTT BLDCRRT: 28 SEC (ref 21–32)
AST SERPL-CCNC: 22 U/L (ref 10–40)
BASOPHILS # BLD AUTO: 0.02 K/UL (ref 0–0.2)
BASOPHILS NFR BLD: 0.2 % (ref 0–1.9)
BILIRUB SERPL-MCNC: 0.4 MG/DL (ref 0.1–1)
BLD GP AB SCN CELLS X3 SERPL QL: ABNORMAL
BLOOD GROUP ANTIBODIES SERPL: NORMAL
BUN SERPL-MCNC: 18 MG/DL (ref 8–23)
CALCIUM SERPL-MCNC: 9.3 MG/DL (ref 8.7–10.5)
CHLORIDE SERPL-SCNC: 105 MMOL/L (ref 95–110)
CO2 SERPL-SCNC: 25 MMOL/L (ref 23–29)
CREAT SERPL-MCNC: 1.2 MG/DL (ref 0.5–1.4)
CRP SERPL-MCNC: 56.5 MG/L (ref 0–8.2)
DIFFERENTIAL METHOD: ABNORMAL
EOSINOPHIL # BLD AUTO: 0.3 K/UL (ref 0–0.5)
EOSINOPHIL NFR BLD: 2.9 % (ref 0–8)
ERYTHROCYTE [DISTWIDTH] IN BLOOD BY AUTOMATED COUNT: 14.3 % (ref 11.5–14.5)
ERYTHROCYTE [SEDIMENTATION RATE] IN BLOOD BY WESTERGREN METHOD: 112 MM/HR (ref 0–20)
EST. GFR  (NO RACE VARIABLE): 45 ML/MIN/1.73 M^2
GLUCOSE SERPL-MCNC: 138 MG/DL (ref 70–110)
HCT VFR BLD AUTO: 33.4 % (ref 37–48.5)
HGB BLD-MCNC: 10.2 G/DL (ref 12–16)
IMM GRANULOCYTES # BLD AUTO: 0.04 K/UL (ref 0–0.04)
IMM GRANULOCYTES NFR BLD AUTO: 0.4 % (ref 0–0.5)
INR PPP: 1.1 (ref 0.8–1.2)
LACTATE SERPL-SCNC: 2.5 MMOL/L (ref 0.5–2.2)
LYMPHOCYTES # BLD AUTO: 1.5 K/UL (ref 1–4.8)
LYMPHOCYTES NFR BLD: 13.7 % (ref 18–48)
MCH RBC QN AUTO: 24.5 PG (ref 27–31)
MCHC RBC AUTO-ENTMCNC: 30.5 G/DL (ref 32–36)
MCV RBC AUTO: 80 FL (ref 82–98)
MONOCYTES # BLD AUTO: 0.6 K/UL (ref 0.3–1)
MONOCYTES NFR BLD: 6 % (ref 4–15)
NEUTROPHILS # BLD AUTO: 8.1 K/UL (ref 1.8–7.7)
NEUTROPHILS NFR BLD: 76.8 % (ref 38–73)
NRBC BLD-RTO: 0 /100 WBC
PLATELET # BLD AUTO: 397 K/UL (ref 150–450)
PMV BLD AUTO: 9.9 FL (ref 9.2–12.9)
POCT GLUCOSE: 132 MG/DL (ref 70–110)
POCT GLUCOSE: 84 MG/DL (ref 70–110)
POTASSIUM SERPL-SCNC: 4.6 MMOL/L (ref 3.5–5.1)
PROCALCITONIN SERPL IA-MCNC: 0.11 NG/ML
PROCALCITONIN SERPL IA-MCNC: 0.11 NG/ML
PROT SERPL-MCNC: 8.2 G/DL (ref 6–8.4)
PROTHROMBIN TIME: 11.2 SEC (ref 9–12.5)
RBC # BLD AUTO: 4.16 M/UL (ref 4–5.4)
SODIUM SERPL-SCNC: 139 MMOL/L (ref 136–145)
WBC # BLD AUTO: 10.57 K/UL (ref 3.9–12.7)

## 2023-02-27 PROCEDURE — 83605 ASSAY OF LACTIC ACID: CPT | Performed by: EMERGENCY MEDICINE

## 2023-02-27 PROCEDURE — 82962 GLUCOSE BLOOD TEST: CPT

## 2023-02-27 PROCEDURE — 85652 RBC SED RATE AUTOMATED: CPT | Performed by: EMERGENCY MEDICINE

## 2023-02-27 PROCEDURE — 86140 C-REACTIVE PROTEIN: CPT | Performed by: EMERGENCY MEDICINE

## 2023-02-27 PROCEDURE — 63600175 PHARM REV CODE 636 W HCPCS: Performed by: STUDENT IN AN ORGANIZED HEALTH CARE EDUCATION/TRAINING PROGRAM

## 2023-02-27 PROCEDURE — 86870 RBC ANTIBODY IDENTIFICATION: CPT | Performed by: EMERGENCY MEDICINE

## 2023-02-27 PROCEDURE — 93005 ELECTROCARDIOGRAM TRACING: CPT

## 2023-02-27 PROCEDURE — 86905 BLOOD TYPING RBC ANTIGENS: CPT | Performed by: EMERGENCY MEDICINE

## 2023-02-27 PROCEDURE — 87040 BLOOD CULTURE FOR BACTERIA: CPT | Performed by: EMERGENCY MEDICINE

## 2023-02-27 PROCEDURE — 80053 COMPREHEN METABOLIC PANEL: CPT | Performed by: EMERGENCY MEDICINE

## 2023-02-27 PROCEDURE — 25000003 PHARM REV CODE 250: Performed by: STUDENT IN AN ORGANIZED HEALTH CARE EDUCATION/TRAINING PROGRAM

## 2023-02-27 PROCEDURE — 63600175 PHARM REV CODE 636 W HCPCS: Performed by: EMERGENCY MEDICINE

## 2023-02-27 PROCEDURE — 85025 COMPLETE CBC W/AUTO DIFF WBC: CPT | Performed by: EMERGENCY MEDICINE

## 2023-02-27 PROCEDURE — 25000003 PHARM REV CODE 250: Performed by: EMERGENCY MEDICINE

## 2023-02-27 PROCEDURE — 99285 EMERGENCY DEPT VISIT HI MDM: CPT | Mod: 25

## 2023-02-27 PROCEDURE — 96365 THER/PROPH/DIAG IV INF INIT: CPT

## 2023-02-27 PROCEDURE — 85730 THROMBOPLASTIN TIME PARTIAL: CPT | Performed by: EMERGENCY MEDICINE

## 2023-02-27 PROCEDURE — 84145 PROCALCITONIN (PCT): CPT | Performed by: EMERGENCY MEDICINE

## 2023-02-27 PROCEDURE — 85610 PROTHROMBIN TIME: CPT | Performed by: EMERGENCY MEDICINE

## 2023-02-27 PROCEDURE — 93010 ELECTROCARDIOGRAM REPORT: CPT | Mod: ,,, | Performed by: INTERNAL MEDICINE

## 2023-02-27 PROCEDURE — 12000002 HC ACUTE/MED SURGE SEMI-PRIVATE ROOM

## 2023-02-27 PROCEDURE — 93010 EKG 12-LEAD: ICD-10-PCS | Mod: ,,, | Performed by: INTERNAL MEDICINE

## 2023-02-27 PROCEDURE — 96375 TX/PRO/DX INJ NEW DRUG ADDON: CPT

## 2023-02-27 PROCEDURE — 86900 BLOOD TYPING SEROLOGIC ABO: CPT | Performed by: EMERGENCY MEDICINE

## 2023-02-27 RX ORDER — ASPIRIN 81 MG/1
81 TABLET ORAL DAILY
Status: DISCONTINUED | OUTPATIENT
Start: 2023-02-28 | End: 2023-03-16

## 2023-02-27 RX ORDER — PROCHLORPERAZINE EDISYLATE 5 MG/ML
5 INJECTION INTRAMUSCULAR; INTRAVENOUS EVERY 6 HOURS PRN
Status: DISCONTINUED | OUTPATIENT
Start: 2023-02-27 | End: 2023-03-20 | Stop reason: HOSPADM

## 2023-02-27 RX ORDER — BISACODYL 10 MG
10 SUPPOSITORY, RECTAL RECTAL DAILY PRN
Status: DISCONTINUED | OUTPATIENT
Start: 2023-02-27 | End: 2023-03-10

## 2023-02-27 RX ORDER — MORPHINE SULFATE 4 MG/ML
4 INJECTION, SOLUTION INTRAMUSCULAR; INTRAVENOUS EVERY 6 HOURS PRN
Status: DISCONTINUED | OUTPATIENT
Start: 2023-02-27 | End: 2023-03-02

## 2023-02-27 RX ORDER — ACETAMINOPHEN 325 MG/1
650 TABLET ORAL EVERY 4 HOURS PRN
Status: DISCONTINUED | OUTPATIENT
Start: 2023-02-27 | End: 2023-03-01

## 2023-02-27 RX ORDER — SODIUM,POTASSIUM PHOSPHATES 280-250MG
2 POWDER IN PACKET (EA) ORAL
Status: DISCONTINUED | OUTPATIENT
Start: 2023-02-27 | End: 2023-03-01

## 2023-02-27 RX ORDER — HEPARIN SODIUM 5000 [USP'U]/ML
5000 INJECTION, SOLUTION INTRAVENOUS; SUBCUTANEOUS EVERY 8 HOURS
Status: DISCONTINUED | OUTPATIENT
Start: 2023-02-27 | End: 2023-03-01

## 2023-02-27 RX ORDER — ONDANSETRON 2 MG/ML
4 INJECTION INTRAMUSCULAR; INTRAVENOUS EVERY 8 HOURS PRN
Status: DISCONTINUED | OUTPATIENT
Start: 2023-02-27 | End: 2023-03-20 | Stop reason: HOSPADM

## 2023-02-27 RX ORDER — SIMETHICONE 80 MG
1 TABLET,CHEWABLE ORAL 4 TIMES DAILY PRN
Status: DISCONTINUED | OUTPATIENT
Start: 2023-02-27 | End: 2023-03-20 | Stop reason: HOSPADM

## 2023-02-27 RX ORDER — IBUPROFEN 200 MG
16 TABLET ORAL
Status: DISCONTINUED | OUTPATIENT
Start: 2023-02-27 | End: 2023-03-03

## 2023-02-27 RX ORDER — MAG HYDROX/ALUMINUM HYD/SIMETH 200-200-20
30 SUSPENSION, ORAL (FINAL DOSE FORM) ORAL 4 TIMES DAILY PRN
Status: DISCONTINUED | OUTPATIENT
Start: 2023-02-27 | End: 2023-03-01

## 2023-02-27 RX ORDER — SODIUM CHLORIDE 0.9 % (FLUSH) 0.9 %
10 SYRINGE (ML) INJECTION EVERY 12 HOURS PRN
Status: DISCONTINUED | OUTPATIENT
Start: 2023-02-27 | End: 2023-03-20 | Stop reason: HOSPADM

## 2023-02-27 RX ORDER — MORPHINE SULFATE 4 MG/ML
4 INJECTION, SOLUTION INTRAMUSCULAR; INTRAVENOUS
Status: COMPLETED | OUTPATIENT
Start: 2023-02-27 | End: 2023-02-27

## 2023-02-27 RX ORDER — NALOXONE HCL 0.4 MG/ML
0.02 VIAL (ML) INJECTION
Status: DISCONTINUED | OUTPATIENT
Start: 2023-02-27 | End: 2023-03-20 | Stop reason: HOSPADM

## 2023-02-27 RX ORDER — LANOLIN ALCOHOL/MO/W.PET/CERES
800 CREAM (GRAM) TOPICAL
Status: DISCONTINUED | OUTPATIENT
Start: 2023-02-27 | End: 2023-03-01

## 2023-02-27 RX ORDER — POLYETHYLENE GLYCOL 3350 17 G/17G
17 POWDER, FOR SOLUTION ORAL DAILY
Status: DISCONTINUED | OUTPATIENT
Start: 2023-02-28 | End: 2023-03-10

## 2023-02-27 RX ORDER — HYDROCHLOROTHIAZIDE 12.5 MG/1
25 TABLET ORAL DAILY
Status: DISCONTINUED | OUTPATIENT
Start: 2023-02-28 | End: 2023-03-03

## 2023-02-27 RX ORDER — ACETAMINOPHEN 325 MG/1
650 TABLET ORAL EVERY 8 HOURS PRN
Status: DISCONTINUED | OUTPATIENT
Start: 2023-02-27 | End: 2023-03-02

## 2023-02-27 RX ORDER — DIPHENHYDRAMINE HCL 25 MG
25 CAPSULE ORAL EVERY 6 HOURS PRN
Status: DISCONTINUED | OUTPATIENT
Start: 2023-02-27 | End: 2023-03-02

## 2023-02-27 RX ORDER — INSULIN ASPART 100 [IU]/ML
25 INJECTION, SUSPENSION SUBCUTANEOUS NIGHTLY
Status: DISCONTINUED | OUTPATIENT
Start: 2023-02-27 | End: 2023-03-01

## 2023-02-27 RX ORDER — INSULIN ASPART 100 [IU]/ML
0-5 INJECTION, SOLUTION INTRAVENOUS; SUBCUTANEOUS
Status: DISCONTINUED | OUTPATIENT
Start: 2023-02-27 | End: 2023-03-03

## 2023-02-27 RX ORDER — LOSARTAN POTASSIUM 25 MG/1
100 TABLET ORAL DAILY
Status: DISCONTINUED | OUTPATIENT
Start: 2023-02-28 | End: 2023-03-05

## 2023-02-27 RX ORDER — IPRATROPIUM BROMIDE AND ALBUTEROL SULFATE 2.5; .5 MG/3ML; MG/3ML
3 SOLUTION RESPIRATORY (INHALATION) EVERY 4 HOURS PRN
Status: DISCONTINUED | OUTPATIENT
Start: 2023-02-27 | End: 2023-03-09

## 2023-02-27 RX ORDER — IBUPROFEN 200 MG
24 TABLET ORAL
Status: DISCONTINUED | OUTPATIENT
Start: 2023-02-27 | End: 2023-03-03

## 2023-02-27 RX ORDER — HYDROCODONE BITARTRATE AND ACETAMINOPHEN 5; 325 MG/1; MG/1
1 TABLET ORAL EVERY 6 HOURS PRN
Status: DISCONTINUED | OUTPATIENT
Start: 2023-02-27 | End: 2023-03-02

## 2023-02-27 RX ORDER — GLUCAGON 1 MG
1 KIT INJECTION
Status: DISCONTINUED | OUTPATIENT
Start: 2023-02-27 | End: 2023-03-17 | Stop reason: SDUPTHER

## 2023-02-27 RX ORDER — CARVEDILOL 12.5 MG/1
12.5 TABLET ORAL 2 TIMES DAILY
Status: DISCONTINUED | OUTPATIENT
Start: 2023-02-27 | End: 2023-03-08

## 2023-02-27 RX ORDER — TALC
6 POWDER (GRAM) TOPICAL NIGHTLY PRN
Status: DISCONTINUED | OUTPATIENT
Start: 2023-02-27 | End: 2023-03-09

## 2023-02-27 RX ORDER — INSULIN ASPART 100 [IU]/ML
50 INJECTION, SUSPENSION SUBCUTANEOUS EVERY MORNING
Status: DISCONTINUED | OUTPATIENT
Start: 2023-02-28 | End: 2023-03-02

## 2023-02-27 RX ORDER — CILOSTAZOL 50 MG/1
50 TABLET ORAL 2 TIMES DAILY
Status: DISCONTINUED | OUTPATIENT
Start: 2023-02-27 | End: 2023-03-06

## 2023-02-27 RX ORDER — AMLODIPINE BESYLATE 10 MG/1
10 TABLET ORAL DAILY
Status: DISCONTINUED | OUTPATIENT
Start: 2023-02-28 | End: 2023-03-20 | Stop reason: HOSPADM

## 2023-02-27 RX ADMIN — DIPHENHYDRAMINE HYDROCHLORIDE 25 MG: 25 CAPSULE ORAL at 09:02

## 2023-02-27 RX ADMIN — CARVEDILOL 12.5 MG: 12.5 TABLET, FILM COATED ORAL at 11:02

## 2023-02-27 RX ADMIN — INSULIN ASPART 1 UNITS: 100 INJECTION, SOLUTION INTRAVENOUS; SUBCUTANEOUS at 11:02

## 2023-02-27 RX ADMIN — VANCOMYCIN HYDROCHLORIDE 1500 MG: 1.5 INJECTION, POWDER, LYOPHILIZED, FOR SOLUTION INTRAVENOUS at 09:02

## 2023-02-27 RX ADMIN — INSULIN ASPART 25 UNITS: 100 INJECTION, SUSPENSION SUBCUTANEOUS at 11:02

## 2023-02-27 RX ADMIN — HEPARIN SODIUM 5000 UNITS: 5000 INJECTION INTRAVENOUS; SUBCUTANEOUS at 11:02

## 2023-02-27 RX ADMIN — MORPHINE SULFATE 4 MG: 4 INJECTION, SOLUTION INTRAMUSCULAR; INTRAVENOUS at 09:02

## 2023-02-27 RX ADMIN — CILOSTAZOL 50 MG: 50 TABLET ORAL at 11:02

## 2023-02-27 RX ADMIN — PIPERACILLIN SODIUM AND TAZOBACTAM SODIUM 4.5 G: 4; .5 INJECTION, POWDER, FOR SOLUTION INTRAVENOUS at 08:02

## 2023-02-28 PROBLEM — Z01.818 PREOPERATIVE CLEARANCE: Status: ACTIVE | Noted: 2023-02-28

## 2023-02-28 PROBLEM — E16.2 HYPOGLYCEMIA: Status: ACTIVE | Noted: 2023-02-28

## 2023-02-28 PROBLEM — Z71.89 ADVANCED CARE PLANNING/COUNSELING DISCUSSION: Status: ACTIVE | Noted: 2023-02-28

## 2023-02-28 LAB
ALBUMIN SERPL BCP-MCNC: 2.9 G/DL (ref 3.5–5.2)
ALP SERPL-CCNC: 61 U/L (ref 55–135)
ALT SERPL W/O P-5'-P-CCNC: 11 U/L (ref 10–44)
ANION GAP SERPL CALC-SCNC: 12 MMOL/L (ref 8–16)
AST SERPL-CCNC: 22 U/L (ref 10–40)
AV INDEX (PROSTH): 0.69
AV MEAN GRADIENT: 10 MMHG
AV PEAK GRADIENT: 17 MMHG
AV VALVE AREA: 2.05 CM2
AV VELOCITY RATIO: 0.73
BASOPHILS # BLD AUTO: 0.04 K/UL (ref 0–0.2)
BASOPHILS NFR BLD: 0.4 % (ref 0–1.9)
BILIRUB DIRECT SERPL-MCNC: 0.4 MG/DL (ref 0.1–0.3)
BILIRUB SERPL-MCNC: 0.7 MG/DL (ref 0.1–1)
BSA FOR ECHO PROCEDURE: 1.83 M2
BUN SERPL-MCNC: 19 MG/DL (ref 8–23)
CALCIUM SERPL-MCNC: 9.4 MG/DL (ref 8.7–10.5)
CHLORIDE SERPL-SCNC: 102 MMOL/L (ref 95–110)
CHOLEST SERPL-MCNC: 109 MG/DL (ref 120–199)
CHOLEST/HDLC SERPL: 2.9 {RATIO} (ref 2–5)
CO2 SERPL-SCNC: 25 MMOL/L (ref 23–29)
CREAT SERPL-MCNC: 1.3 MG/DL (ref 0.5–1.4)
CV ECHO LV RWT: 0.62 CM
DIFFERENTIAL METHOD: ABNORMAL
DOP CALC AO PEAK VEL: 2.04 M/S
DOP CALC AO VTI: 29.4 CM
DOP CALC LVOT AREA: 3 CM2
DOP CALC LVOT DIAMETER: 1.94 CM
DOP CALC LVOT PEAK VEL: 1.48 M/S
DOP CALC LVOT STROKE VOLUME: 60.27 CM3
DOP CALC MV VTI: 24 CM
DOP CALCLVOT PEAK VEL VTI: 20.4 CM
E WAVE DECELERATION TIME: 123.77 MSEC
E/A RATIO: 0.58
E/E' RATIO: 18.22 M/S
ECHO LV POSTERIOR WALL: 1.12 CM (ref 0.6–1.1)
EJECTION FRACTION: 65 %
EOSINOPHIL # BLD AUTO: 0.3 K/UL (ref 0–0.5)
EOSINOPHIL NFR BLD: 2.3 % (ref 0–8)
ERYTHROCYTE [DISTWIDTH] IN BLOOD BY AUTOMATED COUNT: 14.5 % (ref 11.5–14.5)
EST. GFR  (NO RACE VARIABLE): 41 ML/MIN/1.73 M^2
ESTIMATED AVG GLUCOSE: 189 MG/DL (ref 68–131)
FRACTIONAL SHORTENING: 31 % (ref 28–44)
GLUCOSE SERPL-MCNC: 49 MG/DL (ref 70–110)
HBA1C MFR BLD: 8.2 % (ref 4–5.6)
HCT VFR BLD AUTO: 33.5 % (ref 37–48.5)
HDLC SERPL-MCNC: 38 MG/DL (ref 40–75)
HDLC SERPL: 34.9 % (ref 20–50)
HGB BLD-MCNC: 10.3 G/DL (ref 12–16)
IMM GRANULOCYTES # BLD AUTO: 0.04 K/UL (ref 0–0.04)
IMM GRANULOCYTES NFR BLD AUTO: 0.4 % (ref 0–0.5)
INR PPP: 1.1 (ref 0.8–1.2)
INTERVENTRICULAR SEPTUM: 1.02 CM (ref 0.6–1.1)
IVC DIAMETER: 11 CM
IVRT: 133.79 MSEC
LA MAJOR: 5.02 CM
LA MINOR: 4.93 CM
LA WIDTH: 4.1 CM
LACTATE SERPL-SCNC: 1.5 MMOL/L (ref 0.5–2.2)
LDLC SERPL CALC-MCNC: 59.4 MG/DL (ref 63–159)
LEFT ATRIUM SIZE: 3.9 CM
LEFT ATRIUM VOLUME INDEX: 37.8 ML/M2
LEFT ATRIUM VOLUME: 67.61 CM3
LEFT INTERNAL DIMENSION IN SYSTOLE: 2.5 CM (ref 2.1–4)
LEFT VENTRICLE DIASTOLIC VOLUME INDEX: 31.1 ML/M2
LEFT VENTRICLE DIASTOLIC VOLUME: 55.67 ML
LEFT VENTRICLE MASS INDEX: 67 G/M2
LEFT VENTRICLE SYSTOLIC VOLUME INDEX: 12.4 ML/M2
LEFT VENTRICLE SYSTOLIC VOLUME: 22.25 ML
LEFT VENTRICULAR INTERNAL DIMENSION IN DIASTOLE: 3.63 CM (ref 3.5–6)
LEFT VENTRICULAR MASS: 120.63 G
LV LATERAL E/E' RATIO: 20.5 M/S
LV SEPTAL E/E' RATIO: 16.4 M/S
LVOT MG: 4.81 MMHG
LVOT MV: 1.01 CM/S
LYMPHOCYTES # BLD AUTO: 1.5 K/UL (ref 1–4.8)
LYMPHOCYTES NFR BLD: 13.2 % (ref 18–48)
MAGNESIUM SERPL-MCNC: 2.2 MG/DL (ref 1.6–2.6)
MCH RBC QN AUTO: 24.2 PG (ref 27–31)
MCHC RBC AUTO-ENTMCNC: 30.7 G/DL (ref 32–36)
MCV RBC AUTO: 79 FL (ref 82–98)
MONOCYTES # BLD AUTO: 0.8 K/UL (ref 0.3–1)
MONOCYTES NFR BLD: 7.4 % (ref 4–15)
MV MEAN GRADIENT: 4 MMHG
MV PEAK A VEL: 1.42 M/S
MV PEAK E VEL: 0.82 M/S
MV PEAK GRADIENT: 9 MMHG
MV VALVE AREA BY CONTINUITY EQUATION: 2.51 CM2
NEUTROPHILS # BLD AUTO: 8.4 K/UL (ref 1.8–7.7)
NEUTROPHILS NFR BLD: 76.3 % (ref 38–73)
NONHDLC SERPL-MCNC: 71 MG/DL
NRBC BLD-RTO: 0 /100 WBC
PHOSPHATE SERPL-MCNC: 4.7 MG/DL (ref 2.7–4.5)
PISA TR MAX VEL: 2.9 M/S
PLATELET # BLD AUTO: 406 K/UL (ref 150–450)
PMV BLD AUTO: 9.9 FL (ref 9.2–12.9)
POCT GLUCOSE: 159 MG/DL (ref 70–110)
POCT GLUCOSE: 217 MG/DL (ref 70–110)
POCT GLUCOSE: 220 MG/DL (ref 70–110)
POCT GLUCOSE: 220 MG/DL (ref 70–110)
POCT GLUCOSE: 288 MG/DL (ref 70–110)
POCT GLUCOSE: 67 MG/DL (ref 70–110)
POTASSIUM SERPL-SCNC: 4.3 MMOL/L (ref 3.5–5.1)
PROT SERPL-MCNC: 8 G/DL (ref 6–8.4)
PROTHROMBIN TIME: 11.4 SEC (ref 9–12.5)
RA MAJOR: 4.69 CM
RA PRESSURE: 3 MMHG
RA WIDTH: 4 CM
RBC # BLD AUTO: 4.26 M/UL (ref 4–5.4)
RIGHT VENTRICULAR END-DIASTOLIC DIMENSION: 3.71 CM
SINUS: 3.1 CM
SODIUM SERPL-SCNC: 139 MMOL/L (ref 136–145)
STJ: 1.94 CM
TDI LATERAL: 0.04 M/S
TDI SEPTAL: 0.05 M/S
TDI: 0.05 M/S
TR MAX PG: 34 MMHG
TRICUSPID ANNULAR PLANE SYSTOLIC EXCURSION: 1.9 CM
TRIGL SERPL-MCNC: 58 MG/DL (ref 30–150)
TV REST PULMONARY ARTERY PRESSURE: 37 MMHG
WBC # BLD AUTO: 10.99 K/UL (ref 3.9–12.7)

## 2023-02-28 PROCEDURE — 99223 PR INITIAL HOSPITAL CARE,LEVL III: ICD-10-PCS | Mod: ,,, | Performed by: NURSE PRACTITIONER

## 2023-02-28 PROCEDURE — 63600175 PHARM REV CODE 636 W HCPCS: Performed by: STUDENT IN AN ORGANIZED HEALTH CARE EDUCATION/TRAINING PROGRAM

## 2023-02-28 PROCEDURE — 25000003 PHARM REV CODE 250: Performed by: EMERGENCY MEDICINE

## 2023-02-28 PROCEDURE — 99223 1ST HOSP IP/OBS HIGH 75: CPT | Mod: 25,,, | Performed by: INTERNAL MEDICINE

## 2023-02-28 PROCEDURE — 36415 COLL VENOUS BLD VENIPUNCTURE: CPT | Performed by: STUDENT IN AN ORGANIZED HEALTH CARE EDUCATION/TRAINING PROGRAM

## 2023-02-28 PROCEDURE — 84100 ASSAY OF PHOSPHORUS: CPT | Performed by: STUDENT IN AN ORGANIZED HEALTH CARE EDUCATION/TRAINING PROGRAM

## 2023-02-28 PROCEDURE — 80076 HEPATIC FUNCTION PANEL: CPT | Performed by: STUDENT IN AN ORGANIZED HEALTH CARE EDUCATION/TRAINING PROGRAM

## 2023-02-28 PROCEDURE — 80061 LIPID PANEL: CPT | Performed by: STUDENT IN AN ORGANIZED HEALTH CARE EDUCATION/TRAINING PROGRAM

## 2023-02-28 PROCEDURE — 99223 PR INITIAL HOSPITAL CARE,LEVL III: ICD-10-PCS | Mod: 25,,, | Performed by: INTERNAL MEDICINE

## 2023-02-28 PROCEDURE — 11000001 HC ACUTE MED/SURG PRIVATE ROOM

## 2023-02-28 PROCEDURE — 85025 COMPLETE CBC W/AUTO DIFF WBC: CPT | Performed by: STUDENT IN AN ORGANIZED HEALTH CARE EDUCATION/TRAINING PROGRAM

## 2023-02-28 PROCEDURE — 83605 ASSAY OF LACTIC ACID: CPT | Performed by: STUDENT IN AN ORGANIZED HEALTH CARE EDUCATION/TRAINING PROGRAM

## 2023-02-28 PROCEDURE — 83036 HEMOGLOBIN GLYCOSYLATED A1C: CPT | Performed by: STUDENT IN AN ORGANIZED HEALTH CARE EDUCATION/TRAINING PROGRAM

## 2023-02-28 PROCEDURE — 83735 ASSAY OF MAGNESIUM: CPT | Performed by: STUDENT IN AN ORGANIZED HEALTH CARE EDUCATION/TRAINING PROGRAM

## 2023-02-28 PROCEDURE — 99223 1ST HOSP IP/OBS HIGH 75: CPT | Mod: ,,, | Performed by: NURSE PRACTITIONER

## 2023-02-28 PROCEDURE — 63600175 PHARM REV CODE 636 W HCPCS: Performed by: EMERGENCY MEDICINE

## 2023-02-28 PROCEDURE — 25000003 PHARM REV CODE 250: Performed by: STUDENT IN AN ORGANIZED HEALTH CARE EDUCATION/TRAINING PROGRAM

## 2023-02-28 PROCEDURE — 85610 PROTHROMBIN TIME: CPT | Performed by: STUDENT IN AN ORGANIZED HEALTH CARE EDUCATION/TRAINING PROGRAM

## 2023-02-28 PROCEDURE — 25000003 PHARM REV CODE 250: Performed by: INTERNAL MEDICINE

## 2023-02-28 PROCEDURE — 80048 BASIC METABOLIC PNL TOTAL CA: CPT | Performed by: STUDENT IN AN ORGANIZED HEALTH CARE EDUCATION/TRAINING PROGRAM

## 2023-02-28 RX ORDER — ATORVASTATIN CALCIUM 40 MG/1
40 TABLET, FILM COATED ORAL NIGHTLY
Status: DISCONTINUED | OUTPATIENT
Start: 2023-02-28 | End: 2023-03-20 | Stop reason: HOSPADM

## 2023-02-28 RX ORDER — DEXTROSE MONOHYDRATE AND SODIUM CHLORIDE 5; .9 G/100ML; G/100ML
INJECTION, SOLUTION INTRAVENOUS CONTINUOUS
Status: DISCONTINUED | OUTPATIENT
Start: 2023-02-28 | End: 2023-02-28

## 2023-02-28 RX ADMIN — PIPERACILLIN SODIUM AND TAZOBACTAM SODIUM 4.5 G: 4; .5 INJECTION, POWDER, FOR SOLUTION INTRAVENOUS at 06:02

## 2023-02-28 RX ADMIN — AMLODIPINE BESYLATE 10 MG: 10 TABLET ORAL at 08:02

## 2023-02-28 RX ADMIN — HEPARIN SODIUM 5000 UNITS: 5000 INJECTION INTRAVENOUS; SUBCUTANEOUS at 09:02

## 2023-02-28 RX ADMIN — CARVEDILOL 12.5 MG: 12.5 TABLET, FILM COATED ORAL at 08:02

## 2023-02-28 RX ADMIN — ATORVASTATIN CALCIUM 40 MG: 40 TABLET, FILM COATED ORAL at 08:02

## 2023-02-28 RX ADMIN — HEPARIN SODIUM 5000 UNITS: 5000 INJECTION INTRAVENOUS; SUBCUTANEOUS at 01:02

## 2023-02-28 RX ADMIN — HYDROCODONE BITARTRATE AND ACETAMINOPHEN 1 TABLET: 5; 325 TABLET ORAL at 06:02

## 2023-02-28 RX ADMIN — INSULIN ASPART 25 UNITS: 100 INJECTION, SUSPENSION SUBCUTANEOUS at 08:02

## 2023-02-28 RX ADMIN — INSULIN ASPART 1 UNITS: 100 INJECTION, SOLUTION INTRAVENOUS; SUBCUTANEOUS at 08:02

## 2023-02-28 RX ADMIN — LOSARTAN POTASSIUM 100 MG: 50 TABLET, FILM COATED ORAL at 08:02

## 2023-02-28 RX ADMIN — HEPARIN SODIUM 5000 UNITS: 5000 INJECTION INTRAVENOUS; SUBCUTANEOUS at 06:02

## 2023-02-28 RX ADMIN — Medication 1 MG: at 07:02

## 2023-02-28 RX ADMIN — PIPERACILLIN SODIUM AND TAZOBACTAM SODIUM 4.5 G: 4; .5 INJECTION, POWDER, FOR SOLUTION INTRAVENOUS at 11:02

## 2023-02-28 RX ADMIN — CILOSTAZOL 50 MG: 50 TABLET ORAL at 08:02

## 2023-02-28 RX ADMIN — POLYETHYLENE GLYCOL 3350 17 G: 17 POWDER, FOR SOLUTION ORAL at 08:02

## 2023-02-28 RX ADMIN — HYDROCODONE BITARTRATE AND ACETAMINOPHEN 1 TABLET: 5; 325 TABLET ORAL at 09:02

## 2023-02-28 RX ADMIN — INSULIN ASPART 2 UNITS: 100 INJECTION, SOLUTION INTRAVENOUS; SUBCUTANEOUS at 11:02

## 2023-02-28 RX ADMIN — HYDROCODONE BITARTRATE AND ACETAMINOPHEN 1 TABLET: 5; 325 TABLET ORAL at 01:02

## 2023-02-28 RX ADMIN — VANCOMYCIN HYDROCHLORIDE 1000 MG: 1 INJECTION, POWDER, LYOPHILIZED, FOR SOLUTION INTRAVENOUS at 09:02

## 2023-02-28 RX ADMIN — ASPIRIN 81 MG: 81 TABLET, COATED ORAL at 08:02

## 2023-02-28 RX ADMIN — PIPERACILLIN SODIUM AND TAZOBACTAM SODIUM 4.5 G: 4; .5 INJECTION, POWDER, FOR SOLUTION INTRAVENOUS at 01:02

## 2023-02-28 RX ADMIN — INSULIN ASPART 2 UNITS: 100 INJECTION, SOLUTION INTRAVENOUS; SUBCUTANEOUS at 05:02

## 2023-02-28 RX ADMIN — DEXTROSE AND SODIUM CHLORIDE: 5; 900 INJECTION, SOLUTION INTRAVENOUS at 10:02

## 2023-02-28 RX ADMIN — MORPHINE SULFATE 4 MG: 4 INJECTION, SOLUTION INTRAMUSCULAR; INTRAVENOUS at 03:02

## 2023-02-28 RX ADMIN — HYDROCHLOROTHIAZIDE 25 MG: 25 TABLET ORAL at 08:02

## 2023-02-28 NOTE — ASSESSMENT & PLAN NOTE
Presented with worsening right foot pain/swelling and discoloration  Concern for underlying infection  Continue vancomycin/Zosyn  Vascular surgery consult  NPO after midnight

## 2023-02-28 NOTE — CONSULTS
[place] - [facility] Surg  Podiatry  Consult Note    Patient Name: Nelsy Marino  MRN: [id number]  Admission Date: 2/27/2023  Hospital Length of Stay: 1 days  Attending Physician: Jackeline Peralta DO  Primary Care Provider: [facility]     Inpatient consult to Podiatry  Consult performed by: Socorro Stark DPM  Consult ordered by: Jackeline Pearlta DO  Reason for consult: foot wet gangrene      Subjective:     History of Present Illness: Nelsy Marino is a 82 y.o. female with  has a past medical history of CHF (congestive heart failure), Diabetes mellitus, DVT (deep venous thrombosis), Hypertension, and Miscarriage. Consulted to podiatry for evaluation and treatment of right foot infection.   Location: forefoot Onset of the symptoms was several weeks ago. Precipitating event: Patient relates she was seen by a non [facility] podiatrist twice for an ingrown nail until she was able to have slant back procedure performed to the medial nail border. She relates that on Feb 7 the hallux began to change color.  Over time the other digits also began to shoe color change.  She denies trauma or procedure to other digits. She subsequently underwent an angiogram on 02/23 by vascular surgery, notes an occluded left-to-right fem-fem bypass and occluded external iliacs. Presented to ED after PCP was concerned with appearence of foot. In the ED, she was tachycardic (112), and hypertensive.  Labs showed microcytic anemia (10.2), elevated sed rate (112), elevated CRP (56.5), elevated lactic acid (2.5).  Foot x-ray showed DJD and spurs on the calcaneus with no acute process       On today's encounter she was in recliner with feet down.  Darco shoe to the right foot.  She relates improvement in pain when foot is down    Chief Complaint   Patient presents with    Post-op Problem     Pt sent over by PCP for possible gangrene left toe. Pt states she had her left foot operated on 2 months ago and is now experiencing pain, swelling and noticed  discoloration to extremity.           Scheduled Meds:   amLODIPine  10 mg Oral Daily    aspirin  81 mg Oral Daily    carvediloL  12.5 mg Oral BID    cilostazoL  50 mg Oral BID    heparin (porcine)  5,000 Units Subcutaneous Q8H    hydroCHLOROthiazide  25 mg Oral Daily    insulin aspart protamine-insulin aspart  50 Units Subcutaneous QAM    insulin aspart protamine-insulin aspart  25 Units Subcutaneous QHS    losartan  100 mg Oral Daily    piperacillin-tazobactam (ZOSYN) IVPB  4.5 g Intravenous Q8H    polyethylene glycol  17 g Oral Daily    vancomycin (VANCOCIN) IVPB  1,000 mg Intravenous Q24H     Continuous Infusions:   dextrose 5 % and 0.9 % NaCl 100 mL/hr at 02/28/23 1045     PRN Meds:acetaminophen, acetaminophen, albuterol-ipratropium, aluminum-magnesium hydroxide-simethicone, bisacodyL, dextrose 10%, dextrose 10%, diphenhydrAMINE, glucagon (human recombinant), glucose, glucose, HYDROcodone-acetaminophen, insulin aspart U-100, magnesium oxide, magnesium oxide, melatonin, morphine, naloxone, ondansetron, potassium bicarbonate, potassium bicarbonate, potassium bicarbonate, potassium, sodium phosphates, potassium, sodium phosphates, potassium, sodium phosphates, prochlorperazine, simethicone, sodium chloride 0.9%, Pharmacy to dose Vancomycin consult **AND** vancomycin - pharmacy to dose    Review of patient's allergies indicates:   Allergen Reactions    Motrin [ibuprofen] Itching    Zosyn [piperacillin-tazobactam] Itching        Past Medical History:   Diagnosis Date    CHF (congestive heart failure)     Diabetes mellitus     DVT (deep venous thrombosis)     Hypertension     Miscarriage      Past Surgical History:   Procedure Laterality Date     femoral vascular surgery       ANGIOGRAPHY OF LOWER EXTREMITY Right 2/23/2023    Procedure: Angiogram Extremity Unilateral;  Surgeon: Danny Dunbar MD;  Location: [facility];  Service: Vascular;  Laterality: Right;  RN PHONE PREOP 2/20/2023----CK CONSENT    CREATION OF  ILIOFEMORAL ARTERY BYPASS Right 3/20/2019    Procedure: CREATION, BYPASS, ARTERIAL, ILIAC TO FEMORAL, RIGHT LOWER EXTREMITY, RIGHT PROFUNDAPLASTY;  Surgeon: Danny Dunbar MD;  Location: [facility] OR;  Service: Vascular;  Laterality: Right;  11:00AM START PER ANNE RICHARDS PREOP 3/13/2019  ACT'S, CELL SAVER, GRAFTS, BOOK WATER---NEED H/P  CONSENT IN AM------HGBA1C    HYSTERECTOMY      ?unsure cervix present    INCISION AND DRAINAGE Right 2019    Procedure: Incision and Drainage - R groin washout, possible muscle flap;  Surgeon: Danny Dunbar MD;  Location: [facility] OR 2ND FLR;  Service: Vascular;  Laterality: Right;  groin washout    INCISION AND DRAINAGE OF GROIN Right 5/3/2019    Procedure: Incision and Drainage R groin;  Surgeon: Danny Dunbar MD;  [facility];  Service: Vascular;  Laterality: Right;       Family History       Problem Relation (Age of Onset)    Diabetes Father    Hypertension Father          Tobacco Use    Smoking status: Former     Types: Cigarettes     Quit date: 1980     Years since quittin.1    Smokeless tobacco: Never   Substance and Sexual Activity    Alcohol use: No    Drug use: No    Sexual activity: Never     Review of Systems  Objective:     Vital Signs (Most Recent):  Temp: 98 °F (36.7 °C) (23 1119)  Pulse: 83 (23 1119)  Resp: 18 (23 1306)  BP: (!) 115/57 (23 1119)  SpO2: 96 % (23 1119)   Vital Signs (24h Range):  Temp:  [97.5 °F (36.4 °C)-98 °F (36.7 °C)] 98 °F (36.7 °C)  Pulse:  [] 83  Resp:  [18-22] 18  SpO2:  [95 %-98 %] 96 %  BP: (115-188)/(57-74) 115/57     Weight: (P) 73.2 kg (161 lb 6 oz)  Body mass index is 27.7 kg/m² (pended).    Foot Exam    General  General Appearance: appears stated age and healthy   Orientation: alert and oriented to person, place, and time   Affect: appropriate       Right Foot/Ankle     Inspection and Palpation  Tenderness: (forefoot)  Swelling: (forefoot)  Hammertoes: second toe,  third toe, fourth toe and fifth toe  Hallux valgus: no  Hallux limitus: yes  Skin Exam: drainage, maceration, ulcer and erythema;     Neurovascular  Dorsalis pedis: absent  Posterior tibial: absent          Right forefoot  2/28/2023          Laboratory:  A1C:   Recent Labs   Lab 02/28/23  0549   HGBA1C 8.2*     CBC:   Recent Labs   Lab 02/28/23  0549   WBC 10.99   RBC 4.26   HGB 10.3*   HCT 33.5*      MCV 79*   MCH 24.2*   MCHC 30.7*     CMP:   Recent Labs   Lab 02/28/23  0549   GLU 49*   CALCIUM 9.4   ALBUMIN 2.9*   PROT 8.0      K 4.3   CO2 25      BUN 19   CREATININE 1.3   ALKPHOS 61   ALT 11   AST 22   BILITOT 0.7     CRP:   Recent Labs   Lab 02/27/23  1427   CRP 56.5*     ESR:   Recent Labs   Lab 02/27/23  1427   SEDRATE 112*     Microbiology Results (last 7 days)       Procedure Component Value Units Date/Time    Blood culture #2 **CANNOT BE ORDERED STAT** [[id number]] Collected: 02/27/23 1923    Order Status: Completed Specimen: Blood from Peripheral, Antecubital, Right Updated: 02/28/23 2103     Blood Culture, Routine No Growth to date      No Growth to date    Blood culture #1 **CANNOT BE ORDERED STAT** [[id number]] Collected: 02/27/23 1428    Order Status: Completed Specimen: Blood from Peripheral, Antecubital, Left Updated: 02/28/23 1503     Blood Culture, Routine No Growth to date      No Growth to date            Diagnostic Results:  Imaging Results              X-Ray Foot Complete Right (Final result)  Result time 02/27/23 14:01:22   Procedure changed from X-Ray Foot Complete Left     Final result by Stefano Quinteros III, MD (02/27/23 14:01:22)                   Narrative:    EXAMINATION:  XR FOOT COMPLETE 3 VIEW RIGHT    CLINICAL HISTORY:  wound infection;  Other injury of unspecified body region, initial encounter    FINDINGS:  Three views right foot: No fracture, dislocation, bone destruction, or foreign body seen.  There is DJD and spurs on the calcaneus.      Electronically signed  by: Stefano Quinteros MD  Date:    02/27/2023  Time:    14:01                                   Assessment/Plan:     Active Diagnoses:    Diagnosis Date Noted POA    PRINCIPAL PROBLEM:  Wet gangrene [I96] 02/27/2023 Yes    Hypoglycemia [E16.2] 02/28/2023 No    Advanced care planning/counseling discussion [Z71.89] 02/28/2023 Not Applicable    Sepsis [A41.9] 02/27/2023 Yes    PVD (peripheral vascular disease) [I73.9] 03/20/2019 Yes     Chronic    History of DVT (deep vein thrombosis) [Z86.718] 03/20/2019 Not Applicable     Chronic    Essential hypertension [I10] 03/20/2019 Yes     Chronic    Type 2 diabetes mellitus [E11.9] 03/20/2019 Yes     Chronic      Problems Resolved During this Admission:       Discussed wound healing cycle, skin integrity, ways to care for skin.Counseled patient on the effects of PAOD and high blood glucose on healing. They verbalizes understanding that it can increase the chances of delayed healing and this prolonged exposure leads to infection or progression of infection which subsequently can result in loss of limb.    Personally reviewed foot xrays.  No gas nor bone infection noted.  MRI ordered to assess for any early OM or abscess that may indicate need for emergent podiatric surgical intervention     Nursing orders placed for qshift betadine paint to forefoot and interdigitally, offloading boots for bed    Debridement:  deferred    Discussed case with vascular surgery, plan for local wound care until amputation level can be determined unless need for source control based on imaging.    Thank you for your consult. I will follow-up with patient. Please contact us if you have any additional questions.    Socorro Stark DPM  Podiatry  [facility] Surg

## 2023-02-28 NOTE — PROGRESS NOTES
Pharmacokinetic Initial Assessment: IV Vancomycin    Assessment/Plan:    Initiate intravenous vancomycin with loading dose of 1500 mg once followed by a maintenance dose of vancomycin 1000mg IV every 24 hours  Desired empiric serum trough concentration is 10 to 20 mcg/mL  Draw vancomycin trough level 60 min prior to third dose on 3/1/23 at approximately 21:00  Pharmacy will continue to follow and monitor vancomycin.      Please contact pharmacy at extension [phone] with any questions regarding this assessment.     Thank you for the consult,   Nasrin Lo       Patient brief summary:  Nelsy Marino is a 82 y.o. female initiated on antimicrobial therapy with IV Vancomycin for treatment of suspected skin & soft tissue infection    Drug Allergies:   Review of patient's allergies indicates:   Allergen Reactions    Motrin [ibuprofen] Itching    Zosyn [piperacillin-tazobactam] Itching       Actual Body Weight:   73.9 kg    Renal Function:   Estimated Creatinine Clearance: 35.6 mL/min (based on SCr of 1.2 mg/dL).,     Dialysis Method (if applicable):  N/A    CBC (last 72 hours):  Recent Labs   Lab Result Units 02/27/23  1427   WBC K/uL 10.57   Hemoglobin g/dL 10.2*   Hematocrit % 33.4*   Platelets K/uL 397   Gran % % 76.8*   Lymph % % 13.7*   Mono % % 6.0   Eosinophil % % 2.9   Basophil % % 0.2   Differential Method  Automated       Metabolic Panel (last 72 hours):  Recent Labs   Lab Result Units 02/27/23  1427   Sodium mmol/L 139   Potassium mmol/L 4.6   Chloride mmol/L 105   CO2 mmol/L 25   Glucose mg/dL 138*   BUN mg/dL 18   Creatinine mg/dL 1.2   Albumin g/dL 2.9*   Total Bilirubin mg/dL 0.4   Alkaline Phosphatase U/L 66   AST U/L 22   ALT U/L 9*       Drug levels (last 3 results):  No results for input(s): VANCOMYCINRA, VANCORANDOM, VANCOMYCINPE, VANCOPEAK, VANCOMYCINTR, VANCOTROUGH in the last 72 hours.    Microbiologic Results:  Microbiology Results (last 7 days)       Procedure Component Value Units Date/Time     Blood culture #1 **CANNOT BE ORDERED STAT** [[id number]] Collected: 02/27/23 1428    Order Status: Completed Specimen: Blood from Peripheral, Antecubital, Left Updated: 02/27/23 2112     Blood Culture, Routine No Growth to date    Blood culture #2 **CANNOT BE ORDERED STAT** [[id number]] Collected: 02/27/23 1923    Order Status: Sent Specimen: Blood from Peripheral, Antecubital, Right Updated: 02/27/23 1931

## 2023-02-28 NOTE — HPI
82-year-old female with a past medical history of severe PAD, hypertension, type 2 diabetes, history of DVT who presents with right foot pain.  Patient reports worsening right foot pain, swelling and discoloration that started after she had an ingrown toenail removed by her podiatrist.  She reports that her discoloration started since 2/7. Of note, the patient underwent an angiogram on 02/23 by vascular surgery. In the ED, she was tachycardic (112), and hypertensive.  Labs showed microcytic anemia (10.2), elevated sed rate (112), elevated CRP (56.5), elevated lactic acid (2.5).  Foot x-ray showed DJD and spurs on the calcaneus with no acute process.  Vascular surgery was consulted and recommended continuing antibiotics and evaluating in the morning.  She was given morphine 4 mg IV, vancomycin, Zosyn and was admitted for further management.    Cardiology consulted for preop CV eval prior to surgical LE revasc.    The patient is a very pleasant 82-year-old woman who has a history of severe PA D status post left-to-right fem-fem bypass.  She denies any prior history of coronary artery disease or stroke.  She is unable to ambulate much, and has certainly not climb any stairs.  She now presents with painful right foot/toes and wet gangrene.  Angiography performed on February 23 notes an occluded left-to-right fem-fem bypass and occluded external iliacs.  The patient's right lower extremity is subtended by collaterals to the distal common femoral artery.  Cardiology has been consulted for preoperative cardiac risk stratification.  Revised cardiac risk index is 3, suggesting a 10.1% risk of perioperative Mace.  I recommended stress testing.  Echocardiography performed today was normal.

## 2023-02-28 NOTE — PROGRESS NOTES
[facility] Medicine  Progress Note    Patient Name: Nelsy Marino  MRN: [id number]  Patient Class: IP- Inpatient   Admission Date: 2/27/2023  Length of Stay: 1 days  Attending Physician: Jackeline Peralta DO  Primary Care Provider: Juan F Garay        Subjective:     Principal Problem:Wet gangrene        HPI:  This is an 82-year-old female with a past medical history of severe PAD, hypertension, type 2 diabetes, history of DVT who presents with right foot pain.    Patient reports worsening right foot pain, swelling and discoloration that started after she had an ingrown toenail removed by her podiatrist.  She reports that her discoloration started since 2/7. Of note, the patient underwent an angiogram on 02/23 by vascular surgery. In the ED, she was tachycardic (112), and hypertensive.  Labs showed microcytic anemia (10.2), elevated sed rate (112), elevated CRP (56.5), elevated lactic acid (2.5).  Foot x-ray showed DJD and spurs on the calcaneus with no acute process.  Vascular surgery was consulted and recommended continuing antibiotics and evaluating in the morning.  She was given morphine 4 mg IV, vancomycin, Zosyn and was admitted for further management.      Overview/Hospital Course:   82-year-old female with a past medical history of severe PAD, hypertension, type 2 diabetes, history of DVT admitted on 02/27/2023 for gangrene of right toes. Of note, patient recent underwent LE angiogram by Dr. Tran on 02/23/23. Per OP report, was able to get successful revascularization / resolution of stenosis. XR right foot with no fracture, dislocation, bone destruction, or foreign body seen.  There is DJD and spurs on the calcaneus.  Was started on IV Zosyn and vancomycin on admit. Blood  cx with NGTD. On 02/28, patient with hypoglycemia but was asymptomatic. Improved with glucogen. Started on NS with dextrose while NPO. Awaiting consultants evaluation.       Interval History:  No acute  overnight events.  Patient remained afebrile.  Nurse reported the patient was hypoglycemic this morning, but asymptomatic.  Blood glucose was 49 on labs.  Received glucagon with improvement.  Patient denies any symptoms.  She has mild pain in her right foot, but very manageable.  Requesting to see vascular surgery soon.  States    Review of Systems   Constitutional:  Negative for chills, fatigue and fever.   Respiratory:  Negative for cough and shortness of breath.    Cardiovascular:  Negative for chest pain and leg swelling.   Gastrointestinal:  Negative for abdominal pain, nausea and vomiting.   Musculoskeletal:  Positive for arthralgias and gait problem. Negative for joint swelling.   Skin:  Positive for color change.   Neurological:  Negative for dizziness, weakness and headaches.     Objective:     Vital Signs (Most Recent):  Temp: 98 °F (36.7 °C) (02/28/23 1119)  Pulse: 83 (02/28/23 1119)  Resp: 19 (02/28/23 1119)  BP: (!) 115/57 (02/28/23 1119)  SpO2: 96 % (02/28/23 1119)   Vital Signs (24h Range):  Temp:  [97.5 °F (36.4 °C)-98.3 °F (36.8 °C)] 98 °F (36.7 °C)  Pulse:  [] 83  Resp:  [17-22] 19  SpO2:  [95 %-98 %] 96 %  BP: (115-188)/(57-74) 115/57     Weight: (P) 73.2 kg (161 lb 6 oz)  Body mass index is 27.7 kg/m² (pended).  No intake or output data in the 24 hours ending 02/28/23 1254     Physical Exam  Vitals and nursing note reviewed.   Constitutional:       General: She is not in acute distress.     Appearance: She is obese. She is not ill-appearing.      Comments: Very pleasant on exam.  at bedside   Eyes:      Extraocular Movements: Extraocular movements intact.      Pupils: Pupils are equal, round, and reactive to light.   Cardiovascular:      Rate and Rhythm: Normal rate and regular rhythm.      Heart sounds: No murmur heard.    No gallop.   Pulmonary:      Effort: Pulmonary effort is normal. No respiratory distress.      Breath sounds: Normal breath sounds. No wheezing.   Abdominal:       General: There is no distension.      Palpations: Abdomen is soft.      Tenderness: There is no abdominal tenderness. There is no guarding.   Musculoskeletal:         General: No swelling or tenderness.      Right lower leg: Edema present.      Left lower leg: No edema.   Skin:     General: Skin is warm and dry.      Comments: Right foot with gangrenous appearing toes, especially in the toes 1-3.    Neurological:      General: No focal deficit present.      Mental Status: She is alert and oriented to person, place, and time.      Comments: I could not palpate or doppler a pulse on the patient. On chart review, Per ED provider, she has a monophasic, faint dopplerable DP.            Significant Labs: All pertinent labs within the past 24 hours have been reviewed.    Significant Imaging: I have reviewed all pertinent imaging results/findings within the past 24 hours.      Assessment/Plan:      * Wet gangrene  -Presented with worsening right foot pain/swelling and discoloration  -Chart reviewed. Had recent LE angiogram performed on 02/23/2023  -XR right foot reviewed and personally intercepted by me. Appears to have DJD and spurs on the calcaneus. No obvious fractures or dislocations   -Concern for underlying infection  -Vascular surgery consult  -Podiatry consulted  -Continue vancomycin/Zosyn, day 2      Sepsis  This patient does have evidence of infective focus  My overall impression is sepsis.  Source: Skin and Soft Tissue (location right foot)  Antibiotics given-   Antibiotics (72h ago, onward)    Start     Stop Route Frequency Ordered    02/28/23 2200  vancomycin (VANCOCIN) 1,000 mg in dextrose 5 % (D5W) 250 mL IVPB (Vial-Mate)         -- IV Every 24 hours (non-standard times) 02/28/23 0200    02/27/23 2054  vancomycin - pharmacy to dose  (vancomycin IVPB)        See Mark for full Linked Orders Report.    -- IV pharmacy to manage frequency 02/27/23 1954 02/27/23 2000  piperacillin-tazobactam (ZOSYN) 4.5 g  in dextrose 5 % in water (D5W) 5 % 100 mL IVPB (MB+)         -- IV Every 8 hours (non-standard times) 02/27/23 1954        Latest lactate reviewed-  Recent Labs   Lab 02/27/23  1427 02/28/23  0721   LACTATE 2.5* 1.5     Organ dysfunction indicated by NA    Fluid challenge Not needed - patient is not hypotensive      Post- resuscitation assessment Yes Perfusion exam was performed within 6 hours of septic shock presentation after bolus shows Adequate tissue perfusion assessed by non-invasive monitoring       Will Not start Pressors- Levophed for MAP of 65  Source control achieved by: Abx    -Meets sepsis criteria with tachycardia, tachy penia and source being the right foot on admit  -No leukocytosis. LA elevated at 2.5 on admit, trended down   -XR right foot as above  -Right foot appears to have wet gangrene on exam  -Blood cx  NGTD  -Started on vanc/zosyn    Hypoglycemia  -Blood glucose was 49 on 02/28. Pt asymptomatic. Hx of DM type 2  -likely due to being NPO for evaluation  -Blood glucose improved with glucagon  -Started on NS with D5 while NPO  -Continue to monitor     PVD (peripheral vascular disease)  Cont. home medications:  Aspirin, cilostazol  Consult vascular surgery    Essential hypertension  -Continue home medications:  Amlodipine 10 mg daily, hydrochlorothiazide 25 mg daily, losartan 100 mg daily      Type 2 diabetes mellitus  A1c:   Lab Results   Component Value Date    HGBA1C 9.7 (H) 03/20/2019     Meds: basal bolus insulin + SSI PRN to maintain goal 140-180  ADA diet, accuchecks ACHS, hypoglycemic protocol  Hypoglycemia on 02/28, likely due to NPO status and given basal insulin  Monitor closely       History of DVT (deep vein thrombosis)  Not on AC.  No acute issues.      Advanced care planning/counseling discussion  Advance Care Planning     Date: 02/28/2023    Code Status  I engaged the the patient in a conversation about the patient's preferences for care  at the very end of life. The patient  wishes to have CPR and other invasive treatments performed when her heart and/or breathing stops. I communicated to the patient that her wishes align with full code status and she verbalized understanding and agrees. I spent a total of 16 minutes engaging the patient in this advance care planning discussion.           Code Status: Full Code        VTE Risk Mitigation (From admission, onward)         Ordered     heparin (porcine) injection 5,000 Units  Every 8 hours         02/27/23 2200     IP VTE HIGH RISK PATIENT  Once         02/27/23 2200     Place sequential compression device  Until discontinued         02/27/23 2200                Discharge Planning   JUAN ALBERTO:      Code Status: Full Code   Is the patient medically ready for discharge?:     Reason for patient still in hospital (select all that apply): Patient trending condition, Treatment and Consult recommendations                     Jackeline Peralta DO  Department of Hospital Medicine   [facility] Surg

## 2023-02-28 NOTE — ASSESSMENT & PLAN NOTE
-Continue home medications:  Amlodipine 10 mg daily, hydrochlorothiazide 25 mg daily, losartan 100 mg daily

## 2023-02-28 NOTE — ASSESSMENT & PLAN NOTE
Severe RLE PAD (EIA occlusion, chronic) with occluded L->R fem-fem bypass by angio 2/23/23.  Now with rest pain and wet gangrene.  Mgmt per Vasc surgery, plans for surgical revasc.  Cont ASA/cilostazol  Add atorva 40mg qhs  Check lipids  RCRI=3, 10.1% 30d periop MACE risk-> will check Debby MPI in am for further risk stratification.

## 2023-02-28 NOTE — PLAN OF CARE
[place] - Med Surg  Initial Discharge Assessment  Lives with spouse.  Had toe surgery 2/11/2023. Insulin dependent diabetic.  Has rw and glucometer.  [facility] patient.  Has home health does not remember the name.  Plan A:  Home with family and resume home health.  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 Yes  Secondary Emergency Contact: Mercedes Marino  Address: [address] [place] [address] [facility]  Home Phone: [phone]  Relation: Sister   needed? Yes    Discharge Plan A: Home with family, Home Health  Discharge Plan B: Other (TBD)      [facility] [address] [place] [address], [place] [facility] [address]  [address] [facility] [address]  Phone: [phone] Fax: [phone]    [facility] - [facility] [address] [facility]  [address] [facility] [address]  Phone: [phone] Fax: [phone]      Initial Assessment (most recent)       Adult Discharge Assessment - 02/28/23 0950          Discharge Assessment    Assessment Type Discharge Planning Assessment     Confirmed/corrected address, phone number and insurance Yes     Confirmed Demographics Correct on Facesheet     Source of Information patient;family     When was your last doctors appointment? 02/27/23     Communicated JUAN ALBERTO with patient/caregiver Date not available/Unable to determine     Reason For Admission Foot Pain  from recent surgery 2/11     People in Home spouse     Prior to hospitilization cognitive status: Alert/Oriented     Current cognitive status: Alert/Oriented     Walking or Climbing Stairs ambulation difficulty, requires equipment     Mobility Management rw     Readmission within 30 days? No     Patient currently being followed by outpatient case management? Unable to determine (comments)     Do you currently have service(s) that help you manage your care at home? Yes     Name and Contact number of agency Don't rememeber name     Do you take prescription medications? Yes     Do you have prescription coverage? Yes     Coverage [employer] MANAGED MEDICARE - [employer] MEDICARE HMO     Do you have any problems affording any of your prescribed medications? No     Who is going to help you get home at discharge? Dimas Marino (Spouse)   [phone] (Mobile     How do you get to doctors appointments? family or friend will provide     Are you on dialysis? No     Do you take coumadin? No     Discharge Plan A Home with family;Home Health     Discharge Plan B Other   TBD    DME Needed Upon Discharge  other (see comments)   TBD    Discharge Plan discussed with: Spouse/sig other;Patient     Name(s) and Number(s) Dimas Marino (Spouse)   [phone] (Mobile)     Discharge Barriers Identified None        OTHER    Name(s) of People in Home Dimas Marino (Spouse)   [phone] (Mobile)

## 2023-02-28 NOTE — CONSULTS
"Vascular Surgery  Consult Note    Consults  Subjective:     Chief Complaint/Reason for Admission: Wet gangrene    History of Present Illness: This is an 82-year-old female with a past medical history of severe PAD, hypertension, type 2 diabetes, history of DVT who presents with right foot pain.     Patient reports worsening right foot pain, swelling and discoloration that started after she had an ingrown toenail removed by her podiatrist.  She reports that her discoloration started since 2/7. Of note, the patient underwent an angiogram on 02/23 by vascular surgery. In the ED, she was tachycardic (112), and hypertensive.  Labs showed microcytic anemia (10.2), elevated sed rate (112), elevated CRP (56.5), elevated lactic acid (2.5).  Foot x-ray showed DJD and spurs on the calcaneus with no acute process.  Vascular surgery was consulted for further evaluation.    Medications Prior to Admission   Medication Sig Dispense Refill Last Dose    amlodipine (NORVASC) 10 MG tablet Take 10 mg by mouth once daily.       aspirin (ECOTRIN) 81 MG EC tablet Take 81 mg by mouth once daily.       BLOOD SUGAR DIAGNOSTIC (TRUE METRIX GLUCOSE TEST STRIP MISC) by Misc.(Non-Drug; Combo Route) route.       carvediloL (COREG) 12.5 MG tablet Take 12.5 mg by mouth 2 (two) times daily.       cilostazol (PLETAL) 50 MG Tab Take 1 tablet (50 mg total) by mouth 2 (two) times daily. If patient tolerate medication after 4 weeks, increase dose to 100 mg twice daily. 60 tablet 11     glipiZIDE (GLUCOTROL) 10 MG tablet Take 10 mg by mouth 2 (two) times daily before meals.       insulin aspart protamine-insulin aspart (NOVOLOG 70/30) 100 unit/mL (70-30) InPn pen Inject 50 Units into the skin every morning.       insulin aspart protamine-insulin aspart (NOVOLOG 70/30) 100 unit/mL (70-30) InPn pen Inject 50 Units into the skin every evening.       insulin syringe-needle,dispos. 0.3 mL 30 gauge x 5/16" Syrg by Misc.(Non-Drug; Combo Route) route.       " losartan-hydrochlorothiazide 100-25 mg (HYZAAR) 100-25 mg per tablet Take 1 tablet by mouth once daily.       metformin (GLUCOPHAGE) 1000 MG tablet Take 1,000 mg by mouth 2 (two) times daily with meals.       ondansetron (ZOFRAN) 8 MG tablet   0        Review of patient's allergies indicates:   Allergen Reactions    Motrin [ibuprofen] Itching    Zosyn [piperacillin-tazobactam] Itching       Past Medical History:   Diagnosis Date    CHF (congestive heart failure)     Diabetes mellitus     DVT (deep venous thrombosis)     Hypertension     Miscarriage      Past Surgical History:   Procedure Laterality Date     femoral vascular surgery       ANGIOGRAPHY OF LOWER EXTREMITY Right 2/23/2023    Procedure: Angiogram Extremity Unilateral;  Surgeon: Danny Dunbar MD;  Location: [facility] OR;  Service: Vascular;  Laterality: Right;  RN PHONE PREOP 2/20/2023----CK CONSENT    CREATION OF ILIOFEMORAL ARTERY BYPASS Right 3/20/2019    Procedure: CREATION, BYPASS, ARTERIAL, ILIAC TO FEMORAL, RIGHT LOWER EXTREMITY, RIGHT PROFUNDAPLASTY;  Surgeon: Danny Dunbar MD;  Location: [facility] OR;  Service: Vascular;  Laterality: Right;  11:00AM START PER ANNE  RN PREOP 3/13/2019  ACT'S, CELL SAVER, GRAFTS, BOOK WATER---NEED H/P  CONSENT IN AM------HGBA1C    HYSTERECTOMY      ?unsure cervix present    INCISION AND DRAINAGE Right 4/12/2019    Procedure: Incision and Drainage - R groin washout, possible muscle flap;  Surgeon: Danny Dunbar MD;  [facility];  Service: Vascular;  Laterality: Right;  groin washout    INCISION AND DRAINAGE OF GROIN Right 5/3/2019    Procedure: Incision and Drainage R groin;  Surgeon: Danny Dunbar MD;  Location: [facility] OR 2ND FLR;  Service: Vascular;  Laterality: Right;     Family History       Problem Relation (Age of Onset)    Diabetes Father    Hypertension Father          Tobacco Use    Smoking status: Former     Types: Cigarettes     Quit date: 1980     Years since quitting:  43.1    Smokeless tobacco: Never   Substance and Sexual Activity    Alcohol use: No    Drug use: No    Sexual activity: Never     Review of Systems  Constitutional: Negative.    HENT: Negative.     Eyes: Negative.    Respiratory: Negative.     Cardiovascular: Negative.    Gastrointestinal: Negative.    Endocrine: Negative.    Genitourinary: Negative.    Musculoskeletal:  Positive for arthralgias and gait problem.   Skin:  Positive for color change.   Allergic/Immunologic: Negative.    Psychiatric/Behavioral: Negative.  Objective:     Vital Signs (Most Recent):  Temp: 98 °F (36.7 °C) (02/28/23 1119)  Pulse: 83 (02/28/23 1119)  Resp: 19 (02/28/23 1119)  BP: (!) 115/57 (02/28/23 1119)  SpO2: 96 % (02/28/23 1119)   Vital Signs (24h Range):  Temp:  [97.5 °F (36.4 °C)-98.3 °F (36.8 °C)] 98 °F (36.7 °C)  Pulse:  [] 83  Resp:  [17-22] 19  SpO2:  [95 %-98 %] 96 %  BP: (115-188)/(57-74) 115/57     Weight: (P) 73.2 kg (161 lb 6 oz)  Body mass index is 27.7 kg/m² (pended).        Physical Exam  Vitals and nursing note reviewed.   Constitutional:       General: She is not in acute distress.     Appearance: Normal appearance. She is not ill-appearing.   HENT:      Head: Normocephalic and atraumatic.      Nose: Nose normal.      Mouth/Throat:      Mouth: Mucous membranes are moist.   Eyes:      Extraocular Movements: Extraocular movements intact.   Cardiovascular:      Rate and Rhythm: Normal rate.      Pulses: Normal pulses.      Heart sounds: No murmur heard.  Pulmonary:      Effort: Pulmonary effort is normal. No respiratory distress.   Abdominal:      General: Abdomen is flat.      Palpations: Abdomen is soft.      Tenderness: There is no abdominal tenderness.   Musculoskeletal:      Right lower leg: Edema present. No drainage present. ( Unable to doppler DP/PT pulses) Femoral/popliteal pulse 1+      Left lower leg: No edema.   Skin:     General: Skin is warm.   Neurological:      General: No focal deficit present.       Mental Status: She is alert.   Psychiatric:         Mood and Affect: Mood normal.   Significant Labs:  BMP:   Recent Labs   Lab 02/28/23  0549   GLU 49*      K 4.3      CO2 25   BUN 19   CREATININE 1.3   CALCIUM 9.4   MG 2.2     CBC:   Recent Labs   Lab 02/28/23  0549   WBC 10.99   RBC 4.26   HGB 10.3*   HCT 33.5*      MCV 79*   MCH 24.2*   MCHC 30.7*     CMP:   Recent Labs   Lab 02/28/23  0549   GLU 49*   CALCIUM 9.4   ALBUMIN 2.9*   PROT 8.0      K 4.3   CO2 25      BUN 19   CREATININE 1.3   ALKPHOS 61   ALT 11   AST 22   BILITOT 0.7       Significant Diagnostics:  I have reviewed all pertinent imaging results/findings within the past 24 hours.  Xray 02/27/23:   FINDINGS:  Three views right foot: No fracture, dislocation, bone destruction, or foreign body seen.  There is DJD and spurs on the calcaneus.    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 No acute vascular surgical intervention at this time. Patient s/p right lower extremity angiogram 02/2023 with Dr. Dunbar. Pt now presents with worsening right foot pain/swelling and discoloration. Recommend continuing antibiotics and Podiatry recs. Plan for fem-fem redo vs axillary- profunda bypass pending further evaluation, vascular team will discuss with patient her options. Recommend consult to Cardiology, ECHO, and vein mapping for preoperative clearance.     Thank you for your consult. I will follow-up with  patient. Please contact us if you have any additional questions.    Coy Ray NP  Vascular Surgery

## 2023-02-28 NOTE — ED PROVIDER NOTES
Encounter Date: 2/27/2023    SCRIBE #1 NOTE: I, Mary Cortez, am scribing for, and in the presence of,  Dr. Rodo Shirley. I have scribed the following portions of the note - Other sections scribed: HPI/ROS/PE.     History     Chief Complaint   Patient presents with    Post-op Problem     Pt sent over by PCP for possible gangrene left toe. Pt states she had her left foot operated on 2 months ago and is now experiencing pain, swelling and noticed discoloration to extremity.      Nelsy Marino is a 82 y.o. female, with a PMHx of CHF, DM, HTN, DVT who presents to the ED with possible post-op infection. Pt was sent by PCP to the ER for possible gangrene in right toe. Pt reports right foot pain, swelling and discoloration. Pt pain is exacerbated with laying down and has mild alleviation of pain when sitting up. Pt states she had her right toe operated on 2 months ago. No other exacerbating or alleviating factors. Patient denies chest pain, shortness of breath, fever, abdominal pain, or other associated symptoms. This is the extent of the patient's complaints today in the Emergency Department.      The history is provided by the patient.   Review of patient's allergies indicates:   Allergen Reactions    Motrin [ibuprofen] Itching    Zosyn [piperacillin-tazobactam] Itching     Past Medical History:   Diagnosis Date    CHF (congestive heart failure)     Diabetes mellitus     DVT (deep venous thrombosis)     Hypertension     Miscarriage      Past Surgical History:   Procedure Laterality Date     femoral vascular surgery       ANGIOGRAPHY OF LOWER EXTREMITY Right 2/23/2023    Procedure: Angiogram Extremity Unilateral;  Surgeon: Danny Dunbar MD;  Location: [facility];  Service: Vascular;  Laterality: Right;  RN PHONE PREOP 2/20/2023----CK CONSENT    CREATION OF ILIOFEMORAL ARTERY BYPASS Right 3/20/2019    Procedure: CREATION, BYPASS, ARTERIAL, ILIAC TO FEMORAL, RIGHT LOWER EXTREMITY, RIGHT PROFUNDAPLASTY;  Surgeon:  Danny Dunbar MD;  Location: [facility];  Service: Vascular;  Laterality: Right;  11:00AM START PER ANNE RICHARDS PREOP 3/13/2019  ACT'S, CELL SAVER, GRAFTS, BOOK WATER---NEED H/P  CONSENT IN AM------HGBA1C    HYSTERECTOMY      ?unsure cervix present    INCISION AND DRAINAGE Right 2019    Procedure: Incision and Drainage - R groin washout, possible muscle flap;  Surgeon: Danny Dunbar MD;  Location: [facility] OR [address] [place] [address];  Service: Vascular;  Laterality: Right;  groin washout    INCISION AND DRAINAGE OF GROIN Right 5/3/2019    Procedure: Incision and Drainage R groin;  Surgeon: Danny Dunbar MD;  Location: [facility] OR [address] [place] [address];  Service: Vascular;  Laterality: Right;     Family History   Problem Relation Age of Onset    Diabetes Father     Hypertension Father      Social History     Tobacco Use    Smoking status: Former     Types: Cigarettes     Quit date:      Years since quittin.1    Smokeless tobacco: Never   Substance Use Topics    Alcohol use: No    Drug use: No     Review of Systems   Constitutional:  Negative for chills and fever.   HENT:  Negative for facial swelling and sore throat.    Eyes:  Negative for visual disturbance.   Respiratory:  Negative for cough and shortness of breath.    Cardiovascular:  Negative for chest pain and palpitations.   Gastrointestinal:  Negative for abdominal pain, nausea and vomiting.   Genitourinary:  Negative for dysuria and hematuria.   Musculoskeletal:  Negative for back pain.        Positive for pain in right foot.   Skin:  Negative for rash.   Neurological:  Negative for weakness and headaches.   Hematological:  Does not bruise/bleed easily.   Psychiatric/Behavioral: Negative.       Physical Exam     Initial Vitals [23 1306]   BP Pulse Resp Temp SpO2   (!) 168/68 (!) 112 17 98.3 °F (36.8 °C) 97 %      MAP       --         Physical Exam    Nursing note and vitals reviewed.  Constitutional: She appears well-developed and well-nourished. She does not  appear ill. No distress.   HENT:   Head: Normocephalic and atraumatic.   Mouth/Throat: Oropharynx is clear and moist.   Eyes: Conjunctivae and EOM are normal.   Neck:   Normal range of motion.  Cardiovascular:  Regular rhythm and normal heart sounds.   Tachycardia present.         No murmur heard.  Right DP monophasic flow. Left DP biphasic flow with turbulence.   Pulmonary/Chest: Breath sounds normal. No respiratory distress. She has no wheezes.   Abdominal: Abdomen is soft. She exhibits no mass. There is no abdominal tenderness.   Musculoskeletal:         General: No edema.      Cervical back: Normal range of motion.     Neurological: She is alert. GCS score is 15.   Skin: Skin is warm.   Psychiatric: She has a normal mood and affect.           ED Course   Procedures  Labs Reviewed   CBC W/ AUTO DIFFERENTIAL - Abnormal; Notable for the following components:       Result Value    Hemoglobin 10.2 (*)     Hematocrit 33.4 (*)     MCV 80 (*)     MCH 24.5 (*)     MCHC 30.5 (*)     Gran # (ANC) 8.1 (*)     Gran % 76.8 (*)     Lymph % 13.7 (*)     All other components within normal limits   COMPREHENSIVE METABOLIC PANEL - Abnormal; Notable for the following components:    Glucose 138 (*)     Albumin 2.9 (*)     ALT 9 (*)     eGFR 45 (*)     All other components within normal limits   LACTIC ACID, PLASMA - Abnormal; Notable for the following components:    Lactate (Lactic Acid) 2.5 (*)     All other components within normal limits   C-REACTIVE PROTEIN - Abnormal; Notable for the following components:    CRP 56.5 (*)     All other components within normal limits   SEDIMENTATION RATE - Abnormal; Notable for the following components:    Sed Rate 112 (*)     All other components within normal limits   TYPE & SCREEN - Abnormal; Notable for the following components:    Indirect Josselyn POS (*)     All other components within normal limits   POCT GLUCOSE - Abnormal; Notable for the following components:    POCT Glucose 132 (*)      All other components within normal limits   CULTURE, BLOOD   CULTURE, BLOOD   PROCALCITONIN   APTT   PROTIME-INR   PROCALCITONIN   ANTIBODY IDENTIFICATION   POCT GLUCOSE   POCT GLUCOSE MONITORING CONTINUOUS          Imaging Results              X-Ray Foot Complete Right (Final result)  Result time 02/27/23 14:01:22   Procedure changed from X-Ray Foot Complete Left     Final result by Stefano Quinteros III, MD (02/27/23 14:01:22)                   Narrative:    EXAMINATION:  XR FOOT COMPLETE 3 VIEW RIGHT    CLINICAL HISTORY:  wound infection;  Other injury of unspecified body region, initial encounter    FINDINGS:  Three views right foot: No fracture, dislocation, bone destruction, or foreign body seen.  There is DJD and spurs on the calcaneus.      Electronically signed by: Stefano Quinteros MD  Date:    02/27/2023  Time:    14:01                                     Medications   vancomycin - pharmacy to dose (has no administration in time range)   piperacillin-tazobactam (ZOSYN) 4.5 g in dextrose 5 % in water (D5W) 5 % 100 mL IVPB (MB+) (0 g Intravenous Stopped 2/27/23 2143)   diphenhydrAMINE capsule 25 mg (25 mg Oral Given 2/27/23 2145)   morphine injection 4 mg (has no administration in time range)   amLODIPine tablet 10 mg (has no administration in time range)   aspirin EC tablet 81 mg (has no administration in time range)   carvediloL tablet 12.5 mg (12.5 mg Oral Given 2/27/23 2323)   cilostazoL tablet 50 mg (50 mg Oral Given 2/27/23 2323)   insulin aspart protamine-insulin aspart (NovoLOG 70/30) injection (has no administration in time range)   insulin aspart protamine-insulin aspart (NovoLOG 70/30) injection (25 Units Subcutaneous Given 2/27/23 2329)   losartan tablet 100 mg (has no administration in time range)   hydroCHLOROthiazide tablet 25 mg (has no administration in time range)   sodium chloride 0.9% flush 10 mL (has no administration in time range)   heparin (porcine) injection 5,000 Units (5,000 Units  Subcutaneous Given 2/27/23 1998)   albuterol-ipratropium 2.5 mg-0.5 mg/3 mL nebulizer solution 3 mL (has no administration in time range)   melatonin tablet 6 mg (has no administration in time range)   ondansetron injection 4 mg (has no administration in time range)   prochlorperazine injection Soln 5 mg (has no administration in time range)   polyethylene glycol packet 17 g (has no administration in time range)   bisacodyL suppository 10 mg (has no administration in time range)   acetaminophen tablet 650 mg (has no administration in time range)   simethicone chewable tablet 80 mg (has no administration in time range)   aluminum-magnesium hydroxide-simethicone 200-200-20 mg/5 mL suspension 30 mL (has no administration in time range)   acetaminophen tablet 650 mg (has no administration in time range)   HYDROcodone-acetaminophen 5-325 mg per tablet 1 tablet (has no administration in time range)   naloxone 0.4 mg/mL injection 0.02 mg (has no administration in time range)   potassium bicarbonate disintegrating tablet 50 mEq (has no administration in time range)   potassium bicarbonate disintegrating tablet 35 mEq (has no administration in time range)   potassium bicarbonate disintegrating tablet 60 mEq (has no administration in time range)   magnesium oxide tablet 800 mg (has no administration in time range)   magnesium oxide tablet 800 mg (has no administration in time range)   potassium, sodium phosphates 280-160-250 mg packet 2 packet (has no administration in time range)   potassium, sodium phosphates 280-160-250 mg packet 2 packet (has no administration in time range)   potassium, sodium phosphates 280-160-250 mg packet 2 packet (has no administration in time range)   glucose chewable tablet 16 g (has no administration in time range)   glucose chewable tablet 24 g (has no administration in time range)   glucagon (human recombinant) injection 1 mg (has no administration in time range)   insulin aspart U-100 pen 0-5  Units (1 Units Subcutaneous Given 2/27/23 2335)   dextrose 10% bolus 125 mL 125 mL (has no administration in time range)   dextrose 10% bolus 250 mL 250 mL (has no administration in time range)   vancomycin (VANCOCIN) 1,000 mg in dextrose 5 % (D5W) 250 mL IVPB (Vial-Mate) (1,000 mg Intravenous Trough Due As Scheduled Before Dose 3/1/23 2100)   vancomycin 1.5 g in dextrose 5 % 250 mL IVPB (ready to mix) (1,500 mg Intravenous New Bag 2/27/23 2148)   morphine injection 4 mg (4 mg Intravenous Given 2/27/23 2138)     Medical Decision Making:   History:   Old Medical Records: I decided to obtain old medical records.  Initial Assessment:     82-year-old female presenting with what appears to be dry gangrene of toes 1 through 4 on the right foot.  Patient had right iliofemoral bypass roughly 2 months prior.  I was able to auscultate right DP, monophasic/low-flow.  Patient reports pain to the right foot.  There is also concern for diabetic foot infection of the right 5th toe.  Duskiness to the right foot present.  Patient non ill-appearing without sepsis criteria at this time.  Vascular consult placed.  Gram-negative and Gram-positive coverage provided.  Differential Diagnosis:   Vascular insufficiency, dry gangrene, osteomyelitis, diabetic foot infection  Clinical Tests:   Lab Tests: Ordered and Reviewed  Radiological Study: Ordered and Reviewed  Medical Tests: Ordered and Reviewed  ED Management:    Problems considered includes foot pain (Signs & symptoms, differentials)  Chronic problems impacting care includes peripheral vascular disease.  All labs reviewed and considered in the patient's differential diagnosis. Discussion of labs includes no leukocytosis.  Patient afebrile.  Prior records were reviewed and considered in the differential diagnosis. This includes recent hospitalization for right iliofemoral bypass.  The case was discussed with consultant, vascular. This included consultation for possible transfer emergent  surgical evaluation.    Decision regarding hospitalization.  The patient requires additional care in the hospital overnight.   Dr. Lopez with Hospital Medicine will accept the patient. Labs, imaging, or procedures were discussed.    Plan was discussed with the patient.  This includes plan, imaging results, lab results.     All questions or concerns have been addressed.          Scribe Attestation:   Scribe #1: I performed the above scribed service and the documentation accurately describes the services I performed. I attest to the accuracy of the note.      ED Course as of 02/28/23 0305   Mon Feb 27, 2023 1957 Vascular consult placed [JM]   2138 Dr. Bales, vascular    Discussed the case and states that need for acute intervention and should see vascular in the morning.  [JM]      ED Course User Index  [JM] Rodo Shirley MD                 Clinical Impression:   Final diagnoses:  [Z01.810] Preop cardiovascular exam  [T14.8XXA, L08.9] Wound infection  [I96] Dry gangrene  [E11.628, L08.9] Diabetic foot infection (Primary)  [M79.671] Right foot pain       I, Rodo Shirley, personally performed the services described in this documentation. All medical record entries made by the scribe were at my direction and in my presence. I have reviewed the chart and agree that the record reflects my personal performance and is accurate and complete.    ED Disposition Condition    Admit Stable                Rodo Shirley MD  02/28/23 0305

## 2023-02-28 NOTE — ASSESSMENT & PLAN NOTE
Poor premorbid exercise capacity.  RCRI=3, 10.1% 30d periop MACE risk-> will check Debby MPI in am for further risk stratification.

## 2023-02-28 NOTE — SUBJECTIVE & OBJECTIVE
Past Medical History:   Diagnosis Date    CHF (congestive heart failure)     Diabetes mellitus     DVT (deep venous thrombosis)     Hypertension     Miscarriage        Past Surgical History:   Procedure Laterality Date     femoral vascular surgery       ANGIOGRAPHY OF LOWER EXTREMITY Right 2/23/2023    Procedure: Angiogram Extremity Unilateral;  Surgeon: Danny Dunbar MD;  Location: [facility];  Service: Vascular;  Laterality: Right;  RN PHONE PREOP 2/20/2023----CK CONSENT    CREATION OF ILIOFEMORAL ARTERY BYPASS Right 3/20/2019    Procedure: CREATION, BYPASS, ARTERIAL, ILIAC TO FEMORAL, RIGHT LOWER EXTREMITY, RIGHT PROFUNDAPLASTY;  Surgeon: Danny Dunbar MD;  Location: [facility] OR;  Service: Vascular;  Laterality: Right;  11:00AM START PER ANNE  RN PREOP 3/13/2019  ACT'S, CELL SAVER, GRAFTS, BOOK WATER---NEED H/P  CONSENT IN AM------HGBA1C    HYSTERECTOMY      ?unsure cervix present    INCISION AND DRAINAGE Right 4/12/2019    Procedure: Incision and Drainage - R groin washout, possible muscle flap;  Surgeon: Danny Dunbar MD;  Location: [facility] OR [address] [place] [address];  Service: Vascular;  Laterality: Right;  groin washout    INCISION AND DRAINAGE OF GROIN Right 5/3/2019    Procedure: Incision and Drainage R groin;  Surgeon: Danny Dunbar MD;  Location: [facility] OR [address] [place] [address];  Service: Vascular;  Laterality: Right;       Review of patient's allergies indicates:   Allergen Reactions    Motrin [ibuprofen] Itching       No current facility-administered medications on file prior to encounter.     Current Outpatient Medications on File Prior to Encounter   Medication Sig    amlodipine (NORVASC) 10 MG tablet Take 10 mg by mouth once daily.    aspirin (ECOTRIN) 81 MG EC tablet Take 81 mg by mouth once daily.    BLOOD SUGAR DIAGNOSTIC (TRUE METRIX GLUCOSE TEST STRIP MISC) by Misc.(Non-Drug; Combo Route) route.    carvediloL (COREG) 12.5 MG tablet Take 12.5 mg by mouth 2 (two) times daily.    cilostazol (PLETAL) 50 MG Tab  "Take 1 tablet (50 mg total) by mouth 2 (two) times daily. If patient tolerate medication after 4 weeks, increase dose to 100 mg twice daily.    glipiZIDE (GLUCOTROL) 10 MG tablet Take 10 mg by mouth 2 (two) times daily before meals.    insulin aspart protamine-insulin aspart (NOVOLOG 70/30) 100 unit/mL (70-30) InPn pen Inject 50 Units into the skin every morning.    insulin aspart protamine-insulin aspart (NOVOLOG 70/30) 100 unit/mL (70-30) InPn pen Inject 50 Units into the skin every evening.    insulin syringe-needle,dispos. 0.3 mL 30 gauge x 5/16" Syrg by Misc.(Non-Drug; Combo Route) route.    losartan-hydrochlorothiazide 100-25 mg (HYZAAR) 100-25 mg per tablet Take 1 tablet by mouth once daily.    metformin (GLUCOPHAGE) 1000 MG tablet Take 1,000 mg by mouth 2 (two) times daily with meals.    ondansetron (ZOFRAN) 8 MG tablet      Family History       Problem Relation (Age of Onset)    Diabetes Father    Hypertension Father          Tobacco Use    Smoking status: Former     Types: Cigarettes     Quit date:      Years since quittin.1    Smokeless tobacco: Never   Substance and Sexual Activity    Alcohol use: No    Drug use: No    Sexual activity: Never     Review of Systems   Constitutional: Negative.    HENT: Negative.     Eyes: Negative.    Respiratory: Negative.     Cardiovascular: Negative.    Gastrointestinal: Negative.    Endocrine: Negative.    Genitourinary: Negative.    Musculoskeletal:  Positive for arthralgias and gait problem.   Skin:  Positive for color change.   Allergic/Immunologic: Negative.    Psychiatric/Behavioral: Negative.     Objective:     Vital Signs (Most Recent):  Temp: 98.3 °F (36.8 °C) (23 1306)  Pulse: 105 (23)  Resp: 18 (23)  BP: (!) 176/74 (23)  SpO2: 95 % (23)   Vital Signs (24h Range):  Temp:  [98.3 °F (36.8 °C)] 98.3 °F (36.8 °C)  Pulse:  [105-112] 105  Resp:  [17-18] 18  SpO2:  [95 %-97 %] 95 %  BP: (168-176)/(68-74) " 176/74     Weight: 73.9 kg (163 lb)  Body mass index is 27.98 kg/m².    Physical Exam  Vitals and nursing note reviewed.   Constitutional:       General: She is not in acute distress.     Appearance: Normal appearance. She is not ill-appearing.   HENT:      Head: Normocephalic and atraumatic.      Nose: Nose normal.      Mouth/Throat:      Mouth: Mucous membranes are moist.   Eyes:      Extraocular Movements: Extraocular movements intact.   Cardiovascular:      Rate and Rhythm: Normal rate.      Pulses: Normal pulses.      Heart sounds: No murmur heard.  Pulmonary:      Effort: Pulmonary effort is normal. No respiratory distress.   Abdominal:      General: Abdomen is flat.      Palpations: Abdomen is soft.      Tenderness: There is no abdominal tenderness.   Musculoskeletal:      Right lower leg: Edema present.      Left lower leg: No edema.   Skin:     General: Skin is warm.   Neurological:      General: No focal deficit present.      Mental Status: She is alert.   Psychiatric:         Mood and Affect: Mood normal.   I could not palpate or doppler a pulse on the patient. Per ED provider, she has a monophasic, faint dopplerable DP.           Significant Labs: All pertinent labs within the past 24 hours have been reviewed.    Significant Imaging: I have reviewed all pertinent imaging results/findings within the past 24 hours.

## 2023-02-28 NOTE — ASSESSMENT & PLAN NOTE
A1c:   Lab Results   Component Value Date    HGBA1C 9.7 (H) 03/20/2019     Meds: basal bolus insulin + SSI PRN to maintain goal 140-180  ADA diet, accuchecks ACHS, hypoglycemic protocol  Hypoglycemia on 02/28, likely due to NPO status and given basal insulin  Monitor closely

## 2023-02-28 NOTE — NURSING
Pt arrived on the unit via stretcher accompanied by staff and family member. Pt transferred from stretcher to bed with min assistance. Weight obtained and SCDs applied as ordered. Resp even and unlabored on room air. Pt instructed that she can't have any thing to eat or drink for a test/procedure. The pt and her family member oriented to the room and visiting hours discussed.  [facility] [place]  Nurses Note -- 4 Eyes      2/28/2023       Skin assessed on: Admit      [x] No Pressure Injuries Present    []Prevention Measures Documented    [] Yes LDA  for Pressure Injury Previously documented     [] Yes New Pressure Injury Discovered   [] LDA for New Pressure Injury Added    R foot Big toe black and hard, Toe # 2, 3 and 4 are black  Attending RN:  Trell Cochran, RN     Second PCT:  Luz Maria

## 2023-02-28 NOTE — HOSPITAL COURSE
82-year-old female with a past medical history of severe PAD, hypertension, type 2 diabetes, history of DVT admitted on 02/27/2023 for gangrene of right toes. Of note, patient recent underwent LE angiogram by Dr. Tran on 02/23/23. Per OP report, was able to get successful revascularization / resolution of stenosis. XR right foot with no fracture, dislocation, bone destruction, or foreign body seen.  There is DJD and spurs on the calcaneus.  Was started on IV Zosyn and vancomycin on admit. Blood  cx with NGTD. On 02/28, patient with hypoglycemia but was asymptomatic. Improved with glucogen. Likely due to NPO status and was still receiving insulin.  Cardiology consulted for preoperative clearance.  Recommended stress test.  Normal myocardial perfusion scan.  Cardiology deem patient at acceptable cardiac risk for vasc surgery/anesthesia.  Vascular surgery consulted and patient underwent fem fem bypass on 3/2/2023. Podiatry also consulted and patient underwent right TMA on 3/4/23.     Stepped down to Hospital Medicine service. O2 requirement steadily improving while on diuresis. Continued lasix for dCHF. Requiring 2L supplemental oxygen at all times with 6 min walk test. Home Oxygen ordered. S/p 2 units pRBC transfusion for Hb<7. Gi consulted for melena. No plans for EGD as Hb stablized. Continue PPI. OP GI FU.  WBC continues to rise from 13 to 15. Per vascular surgery, continuing wound care for right TMA and no evidence of active infection at the surgical site. Per ID, switched Meropenem to ciprofloxacin 500 mg q12 hours to finish total 14 days of therapy for pseudomonas fluorescens SSTI. EOT 3/23/23. After this point, patient can resume her cephalexin 1000 mg BID Ppx regimen for chronic suppression of prior graft infection. Op fu with ID. Podiatry consulted.RLE WB to heel in surgical shoe for transfers or short distances. Elevate RLE at all times while in bed or chair.OP podiatry follow up.  Wound care  instructions for discharge include RLE WB to heel in surgical shoe for transfers or short distances. HH/facility dressing changes R foot 3x/week: cleanse incision with saline, apply betadine soaked adaptic, apply gauze, wrap with cast padding and coban.     PT/OT rec SNF. However patient refused and prefers to go home with HH. Advised to F/u with PCP in 1 week. Patient is currently medically and HDS. He is being d/c home. Plan of care discussed with patient, verbalized understanding. All questions were answered.

## 2023-02-28 NOTE — CONSULTS
[facility] [place] [facility]  Cardiology  Consult Note    Patient Name: Nelsy Marino  MRN: [id number]  Admission Date: 2/27/2023  Hospital Length of Stay: 1 days  Code Status: Full Code   Attending Provider: Jackeline Peralta DO   Consulting Provider: Demarco Wright MD  Primary Care Physician: Juan F Lozano Browningliban  Principal Problem:Wet gangrene    Patient information was obtained from patient and ER records.     Inpatient consult to Cardiology  Consult performed by: Demarco Wright MD  Consult ordered by: Coy Ray NP  Reason for consult: preop CV eval        Subjective:     Chief Complaint:  RLE pain     HPI:   82-year-old female with a past medical history of severe PAD, hypertension, type 2 diabetes, history of DVT who presents with right foot pain.  Patient reports worsening right foot pain, swelling and discoloration that started after she had an ingrown toenail removed by her podiatrist.  She reports that her discoloration started since 2/7. Of note, the patient underwent an angiogram on 02/23 by vascular surgery. In the ED, she was tachycardic (112), and hypertensive.  Labs showed microcytic anemia (10.2), elevated sed rate (112), elevated CRP (56.5), elevated lactic acid (2.5).  Foot x-ray showed DJD and spurs on the calcaneus with no acute process.  Vascular surgery was consulted and recommended continuing antibiotics and evaluating in the morning.  She was given morphine 4 mg IV, vancomycin, Zosyn and was admitted for further management.    Cardiology consulted for preop CV eval prior to surgical LE revasc.    The patient is a very pleasant 82-year-old woman who has a history of severe PA D status post left-to-right fem-fem bypass.  She denies any prior history of coronary artery disease or stroke.  She is unable to ambulate much, and has certainly not climb any stairs.  She now presents with painful right foot/toes and wet gangrene.  Angiography performed on February 23 notes an occluded  left-to-right fem-fem bypass and occluded external iliacs.  The patient's right lower extremity is subtended by collaterals to the distal common femoral artery.  Cardiology has been consulted for preoperative cardiac risk stratification.  Revised cardiac risk index is 3, suggesting a 10.1% risk of perioperative Mace.  I recommended stress testing.  Echocardiography performed today was normal.      Past Medical History:   Diagnosis Date    CHF (congestive heart failure)     Diabetes mellitus     DVT (deep venous thrombosis)     Hypertension     Miscarriage        Past Surgical History:   Procedure Laterality Date     femoral vascular surgery       ANGIOGRAPHY OF LOWER EXTREMITY Right 2/23/2023    Procedure: Angiogram Extremity Unilateral;  Surgeon: Danny Dunbar MD;  Location: [facility] OR;  Service: Vascular;  Laterality: Right;  RN PHONE PREOP 2/20/2023----CK CONSENT    CREATION OF ILIOFEMORAL ARTERY BYPASS Right 3/20/2019    Procedure: CREATION, BYPASS, ARTERIAL, ILIAC TO FEMORAL, RIGHT LOWER EXTREMITY, RIGHT PROFUNDAPLASTY;  Surgeon: Danny Dunbar MD;  Location: [facility] OR;  Service: Vascular;  Laterality: Right;  11:00AM START PER ANNE RICHARDS PREOP 3/13/2019  ACT'S, CELL SAVER, GRAFTS, BOOK WATER---NEED H/P  CONSENT IN AM------HGBA1C    HYSTERECTOMY      ?unsure cervix present    INCISION AND DRAINAGE Right 4/12/2019    Procedure: Incision and Drainage - R groin washout, possible muscle flap;  Surgeon: Danny Dunbar MD;  Location: [facility] OR [address] [place] [address];  Service: Vascular;  Laterality: Right;  groin washout    INCISION AND DRAINAGE OF GROIN Right 5/3/2019    Procedure: Incision and Drainage R groin;  Surgeon: Danny Dunbar MD;  Location: [facility] OR [address] [place] [address];  Service: Vascular;  Laterality: Right;       Review of patient's allergies indicates:   Allergen Reactions    Motrin [ibuprofen] Itching    Zosyn [piperacillin-tazobactam] Itching       No current facility-administered medications on  "file prior to encounter.     Current Outpatient Medications on File Prior to Encounter   Medication Sig    amlodipine (NORVASC) 10 MG tablet Take 10 mg by mouth once daily.    aspirin (ECOTRIN) 81 MG EC tablet Take 81 mg by mouth once daily.    BLOOD SUGAR DIAGNOSTIC (TRUE METRIX GLUCOSE TEST STRIP MISC) by Misc.(Non-Drug; Combo Route) route.    carvediloL (COREG) 12.5 MG tablet Take 12.5 mg by mouth 2 (two) times daily.    cilostazol (PLETAL) 50 MG Tab Take 1 tablet (50 mg total) by mouth 2 (two) times daily. If patient tolerate medication after 4 weeks, increase dose to 100 mg twice daily.    glipiZIDE (GLUCOTROL) 10 MG tablet Take 10 mg by mouth 2 (two) times daily before meals.    insulin aspart protamine-insulin aspart (NOVOLOG 70/30) 100 unit/mL (70-30) InPn pen Inject 50 Units into the skin every morning.    insulin aspart protamine-insulin aspart (NOVOLOG 70/30) 100 unit/mL (70-30) InPn pen Inject 50 Units into the skin every evening.    insulin syringe-needle,dispos. 0.3 mL 30 gauge x 5/16" Syrg by Misc.(Non-Drug; Combo Route) route.    losartan-hydrochlorothiazide 100-25 mg (HYZAAR) 100-25 mg per tablet Take 1 tablet by mouth once daily.    metformin (GLUCOPHAGE) 1000 MG tablet Take 1,000 mg by mouth 2 (two) times daily with meals.    ondansetron (ZOFRAN) 8 MG tablet      Family History       Problem Relation (Age of Onset)    Diabetes Father    Hypertension Father          Tobacco Use    Smoking status: Former     Types: Cigarettes     Quit date:      Years since quittin.1    Smokeless tobacco: Never   Substance and Sexual Activity    Alcohol use: No    Drug use: No    Sexual activity: Never     Review of Systems   Constitutional: Negative for chills, diaphoresis, fever and malaise/fatigue.   HENT:  Negative for nosebleeds.    Eyes:  Negative for blurred vision and double vision.   Cardiovascular:  Positive for claudication. Negative for chest pain, cyanosis, dyspnea on exertion, " leg swelling, orthopnea, palpitations, paroxysmal nocturnal dyspnea and syncope.   Respiratory:  Negative for cough, shortness of breath and wheezing.    Skin:  Negative for dry skin and poor wound healing.   Musculoskeletal:  Negative for back pain, joint swelling and myalgias.   Gastrointestinal:  Negative for abdominal pain, nausea and vomiting.   Genitourinary:  Negative for hematuria.   Neurological:  Negative for dizziness, headaches, numbness, seizures and weakness.   Psychiatric/Behavioral:  Negative for altered mental status and depression.    Objective:     Vital Signs (Most Recent):  Temp: 98 °F (36.7 °C) (02/28/23 1119)  Pulse: 83 (02/28/23 1119)  Resp: 17 (02/28/23 1511)  BP: (!) 115/57 (02/28/23 1119)  SpO2: 96 % (02/28/23 1119)   Vital Signs (24h Range):  Temp:  [97.5 °F (36.4 °C)-98 °F (36.7 °C)] 98 °F (36.7 °C)  Pulse:  [] 83  Resp:  [17-22] 17  SpO2:  [95 %-98 %] 96 %  BP: (115-188)/(57-74) 115/57     Weight: 73.9 kg (163 lb)  Body mass index is 27.98 kg/m².    SpO2: 96 %         Intake/Output Summary (Last 24 hours) at 2/28/2023 1609  Last data filed at 2/28/2023 1445  Gross per 24 hour   Intake 120 ml   Output --   Net 120 ml       Lines/Drains/Airways       Peripheral Intravenous Line  Duration                  Peripheral IV - Single Lumen 02/27/23 1923 20 G Right Antecubital <1 day                    Physical Exam  Constitutional:       General: She is not in acute distress.     Appearance: Normal appearance. She is well-developed. She is not ill-appearing, toxic-appearing or diaphoretic.   HENT:      Head: Normocephalic and atraumatic.   Eyes:      General: No scleral icterus.     Extraocular Movements: Extraocular movements intact.      Conjunctiva/sclera: Conjunctivae normal.      Pupils: Pupils are equal, round, and reactive to light.   Neck:      Vascular: No JVD.      Trachea: No tracheal deviation.   Cardiovascular:      Rate and Rhythm: Normal rate and regular rhythm.      Heart  sounds: S1 normal and S2 normal. No murmur heard.    No friction rub. No gallop.   Pulmonary:      Effort: Pulmonary effort is normal. No respiratory distress.      Breath sounds: Normal breath sounds. No stridor. No wheezing, rhonchi or rales.   Chest:      Chest wall: No tenderness.   Abdominal:      General: There is no distension.      Palpations: Abdomen is soft.   Musculoskeletal:         General: No swelling or tenderness. Normal range of motion.      Cervical back: Normal range of motion and neck supple. No rigidity.      Right lower leg: No edema.      Left lower leg: No edema.      Comments: R foot wet gangrene of great and 2nd toes   Skin:     General: Skin is warm and dry.      Coloration: Skin is not jaundiced.   Neurological:      General: No focal deficit present.      Mental Status: She is alert and oriented to person, place, and time.      Cranial Nerves: No cranial nerve deficit.   Psychiatric:         Mood and Affect: Mood normal.         Behavior: Behavior normal.       Current Medications:   amLODIPine  10 mg Oral Daily    aspirin  81 mg Oral Daily    carvediloL  12.5 mg Oral BID    cilostazoL  50 mg Oral BID    heparin (porcine)  5,000 Units Subcutaneous Q8H    hydroCHLOROthiazide  25 mg Oral Daily    insulin aspart protamine-insulin aspart  50 Units Subcutaneous QAM    insulin aspart protamine-insulin aspart  25 Units Subcutaneous QHS    losartan  100 mg Oral Daily    piperacillin-tazobactam (ZOSYN) IVPB  4.5 g Intravenous Q8H    polyethylene glycol  17 g Oral Daily    vancomycin (VANCOCIN) IVPB  1,000 mg Intravenous Q24H       acetaminophen, acetaminophen, albuterol-ipratropium, aluminum-magnesium hydroxide-simethicone, bisacodyL, dextrose 10%, dextrose 10%, diphenhydrAMINE, glucagon (human recombinant), glucose, glucose, HYDROcodone-acetaminophen, insulin aspart U-100, magnesium oxide, magnesium oxide, melatonin, morphine, naloxone, ondansetron, potassium bicarbonate, potassium  bicarbonate, potassium bicarbonate, potassium, sodium phosphates, potassium, sodium phosphates, potassium, sodium phosphates, prochlorperazine, simethicone, sodium chloride 0.9%, Pharmacy to dose Vancomycin consult **AND** vancomycin - pharmacy to dose    Laboratory (all labs reviewed):  CBC:  Recent Labs   Lab 02/15/23  1002 02/27/23  1427 02/28/23  0549   WBC 10.22 10.57 10.99   Hemoglobin 10.7 L 10.2 L 10.3 L   Hematocrit 35.0 L 33.4 L 33.5 L   Platelets 395 397 406       CHEMISTRIES:  Recent Labs   Lab 01/30/23  0708 02/15/23  1002 02/27/23  1427 02/28/23  0549   Glucose  --  157 H 138 H 49 LL   Sodium  --  142 139 139   Potassium  --  4.5 4.6 4.3   BUN  --  26 H 18 19   Creatinine 1.1 1.0 1.2 1.3   eGFR 50 A 56 A 45 A 41 A   Calcium  --  9.3 9.3 9.4   Magnesium  --  2.3  --  2.2       CARDIAC BIOMARKERS:        COAGS:  Recent Labs   Lab 02/15/23  1002 02/27/23  1427 02/28/23  0549   INR 1.1 1.1 1.1       LIPIDS/LFTS:  Recent Labs   Lab 02/27/23  1427 02/28/23  0549   AST 22 22   ALT 9 L 11       BNP:        TSH:        Free T4:        Diagnostic Results:  ECG (personally reviewed and interpreted tracing(s)):  2/27/23 1447 , IMI-age indet, NSSTTW changes    Echo 2/28/23 (images personally reviewed and interpreted)   The left ventricle is normal in size with mild concentric hypertrophy and normal systolic function.   The estimated ejection fraction is 65%.   Grade I left ventricular diastolic dysfunction.   Normal right ventricular size with normal right ventricular systolic function.   The estimated PA systolic pressure is 37 mmHg.        Assessment and Plan:     * Wet gangrene  Severe RLE PAD (EIA occlusion, chronic) with occluded L->R fem-fem bypass by angio 2/23/23.  Now with rest pain and wet gangrene.  Mgmt per Vasc surgery, plans for surgical revasc.  Cont ASA/cilostazol  Add atorva 40mg qhs  Check lipids  RCRI=3, 10.1% 30d periop MACE risk-> will check Debby MPI in am for further risk  stratification.     PVD (peripheral vascular disease)  As above  Add statin    Essential hypertension  Cont med rx    Type 2 diabetes mellitus  Per primary team  Add statin as above    Preoperative clearance  Poor premorbid exercise capacity.  RCRI=3, 10.1% 30d periop MACE risk-> will check Debby MPI in am for further risk stratification.           VTE Risk Mitigation (From admission, onward)         Ordered     heparin (porcine) injection 5,000 Units  Every 8 hours         02/27/23 2200     IP VTE HIGH RISK PATIENT  Once         02/27/23 2200     Place sequential compression device  Until discontinued         02/27/23 2200                Thank you for your consult. I will follow-up with patient. Please contact us if you have any additional questions.    Demarco Wright MD  Cardiology   [facility]

## 2023-02-28 NOTE — ASSESSMENT & PLAN NOTE
Patient's FSGs are uncontrolled due to hyperglycemia on current medication regimen.  Last A1c reviewed-   Lab Results   Component Value Date    HGBA1C 9.7 (H) 03/20/2019     Most recent fingerstick glucose reviewed-   Recent Labs   Lab 02/27/23  1440 02/27/23 2059   POCTGLUCOSE 132* 84     Current correctional scale  Low  Maintain anti-hyperglycemic dose as follows-   Antihyperglycemics (From admission, onward)    Start     Stop Route Frequency Ordered    02/28/23 0700  insulin aspart protamine-insulin aspart (NovoLOG 70/30) injection         -- SubQ Every morning 02/27/23 2200 02/27/23 2301  insulin aspart U-100 pen 0-5 Units         -- SubQ Before meals & nightly PRN 02/27/23 2201 02/27/23 2300  insulin aspart protamine-insulin aspart (NovoLOG 70/30) injection         -- SubQ Nightly 02/27/23 2200

## 2023-02-28 NOTE — SUBJECTIVE & OBJECTIVE
Interval History:  No acute overnight events.  Patient remained afebrile.  Nurse reported the patient was hypoglycemic this morning, but asymptomatic.  Blood glucose was 49 on labs.  Received glucagon with improvement.  Patient denies any symptoms.  She has mild pain in her right foot, but very manageable.  Requesting to see vascular surgery soon.  States    Review of Systems   Constitutional:  Negative for chills, fatigue and fever.   Respiratory:  Negative for cough and shortness of breath.    Cardiovascular:  Negative for chest pain and leg swelling.   Gastrointestinal:  Negative for abdominal pain, nausea and vomiting.   Musculoskeletal:  Positive for arthralgias and gait problem. Negative for joint swelling.   Skin:  Positive for color change.   Neurological:  Negative for dizziness, weakness and headaches.     Objective:     Vital Signs (Most Recent):  Temp: 98 °F (36.7 °C) (02/28/23 1119)  Pulse: 83 (02/28/23 1119)  Resp: 19 (02/28/23 1119)  BP: (!) 115/57 (02/28/23 1119)  SpO2: 96 % (02/28/23 1119)   Vital Signs (24h Range):  Temp:  [97.5 °F (36.4 °C)-98.3 °F (36.8 °C)] 98 °F (36.7 °C)  Pulse:  [] 83  Resp:  [17-22] 19  SpO2:  [95 %-98 %] 96 %  BP: (115-188)/(57-74) 115/57     Weight: (P) 73.2 kg (161 lb 6 oz)  Body mass index is 27.7 kg/m² (pended).  No intake or output data in the 24 hours ending 02/28/23 1254     Physical Exam  Vitals and nursing note reviewed.   Constitutional:       General: She is not in acute distress.     Appearance: She is obese. She is not ill-appearing.      Comments: Very pleasant on exam.  at bedside   Eyes:      Extraocular Movements: Extraocular movements intact.      Pupils: Pupils are equal, round, and reactive to light.   Cardiovascular:      Rate and Rhythm: Normal rate and regular rhythm.      Heart sounds: No murmur heard.    No gallop.   Pulmonary:      Effort: Pulmonary effort is normal. No respiratory distress.      Breath sounds: Normal breath sounds. No  wheezing.   Abdominal:      General: There is no distension.      Palpations: Abdomen is soft.      Tenderness: There is no abdominal tenderness. There is no guarding.   Musculoskeletal:         General: No swelling or tenderness.      Right lower leg: Edema present.      Left lower leg: No edema.   Skin:     General: Skin is warm and dry.      Comments: Right foot with gangrenous appearing toes, especially in the toes 1-3.    Neurological:      General: No focal deficit present.      Mental Status: She is alert and oriented to person, place, and time.      Comments: I could not palpate or doppler a pulse on the patient. On chart review, Per ED provider, she has a monophasic, faint dopplerable DP.            Significant Labs: All pertinent labs within the past 24 hours have been reviewed.    Significant Imaging: I have reviewed all pertinent imaging results/findings within the past 24 hours.

## 2023-02-28 NOTE — H&P
[facility] Emergency Dept  [facility] Medicine  History & Physical    Patient Name: Nelsy Marino  MRN: [id number]  Patient Class: IP- Inpatient  Admission Date: 2/27/2023  Attending Physician: Jacobo Phillips MD   Primary Care Provider: [facility]         Patient information was obtained from patient and ER records.     Subjective:     Principal Problem:Wet gangrene    Chief Complaint:   Chief Complaint   Patient presents with    Post-op Problem     Pt sent over by PCP for possible gangrene left toe. Pt states she had her left foot operated on 2 months ago and is now experiencing pain, swelling and noticed discoloration to extremity.         HPI: This is an 82-year-old female with a past medical history of severe PAD, hypertension, type 2 diabetes, history of DVT who presents with right foot pain.    Patient reports worsening right foot pain, swelling and discoloration that started after she had an ingrown toenail removed by her podiatrist.  She reports that her discoloration started since 2/7. Of note, the patient underwent an angiogram on 02/23 by vascular surgery. In the ED, she was tachycardic (112), and hypertensive.  Labs showed microcytic anemia (10.2), elevated sed rate (112), elevated CRP (56.5), elevated lactic acid (2.5).  Foot x-ray showed DJD and spurs on the calcaneus with no acute process.  Vascular surgery was consulted and recommended continuing antibiotics and evaluating in the morning.  She was given morphine 4 mg IV, vancomycin, Zosyn and was admitted for further management.      Past Medical History:   Diagnosis Date    CHF (congestive heart failure)     Diabetes mellitus     DVT (deep venous thrombosis)     Hypertension     Miscarriage        Past Surgical History:   Procedure Laterality Date     femoral vascular surgery       ANGIOGRAPHY OF LOWER EXTREMITY Right 2/23/2023    Procedure: Angiogram Extremity Unilateral;  Surgeon: Danny Dunbar MD;  Location: [facility] OR;   Service: Vascular;  Laterality: Right;  RN PHONE PREOP 2/20/2023----CK CONSENT    CREATION OF ILIOFEMORAL ARTERY BYPASS Right 3/20/2019    Procedure: CREATION, BYPASS, ARTERIAL, ILIAC TO FEMORAL, RIGHT LOWER EXTREMITY, RIGHT PROFUNDAPLASTY;  Surgeon: Danny Dunbar MD;  Location[facility] OR;  Service: Vascular;  Laterality: Right;  11:00AM START PER ANNE  RN PREOP 3/13/2019  ACT'S, CELL SAVER, GRAFTS, BOOK WATER---NEED H/P  CONSENT IN AM------HGBA1C    HYSTERECTOMY      ?unsure cervix present    INCISION AND DRAINAGE Right 4/12/2019    Procedure: Incision and Drainage - R groin washout, possible muscle flap;  Surgeon: Danny Dunbar MD;  Location: [facility] OR [address] [place] [address];  Service: Vascular;  Laterality: Right;  groin washout    INCISION AND DRAINAGE OF GROIN Right 5/3/2019    Procedure: Incision and Drainage R groin;  Surgeon: Danny Dunbar MD;  Location: [facility] OR [address] [place] [address];  Service: Vascular;  Laterality: Right;       Review of patient's allergies indicates:   Allergen Reactions    Motrin [ibuprofen] Itching       No current facility-administered medications on file prior to encounter.     Current Outpatient Medications on File Prior to Encounter   Medication Sig    amlodipine (NORVASC) 10 MG tablet Take 10 mg by mouth once daily.    aspirin (ECOTRIN) 81 MG EC tablet Take 81 mg by mouth once daily.    BLOOD SUGAR DIAGNOSTIC (TRUE METRIX GLUCOSE TEST STRIP MISC) by Misc.(Non-Drug; Combo Route) route.    carvediloL (COREG) 12.5 MG tablet Take 12.5 mg by mouth 2 (two) times daily.    cilostazol (PLETAL) 50 MG Tab Take 1 tablet (50 mg total) by mouth 2 (two) times daily. If patient tolerate medication after 4 weeks, increase dose to 100 mg twice daily.    glipiZIDE (GLUCOTROL) 10 MG tablet Take 10 mg by mouth 2 (two) times daily before meals.    insulin aspart protamine-insulin aspart (NOVOLOG 70/30) 100 unit/mL (70-30) InPn pen Inject 50 Units into the skin every morning.    insulin aspart  "protamine-insulin aspart (NOVOLOG 70/30) 100 unit/mL (70-30) InPn pen Inject 50 Units into the skin every evening.    insulin syringe-needle,dispos. 0.3 mL 30 gauge x 5/16" Syrg by Misc.(Non-Drug; Combo Route) route.    losartan-hydrochlorothiazide 100-25 mg (HYZAAR) 100-25 mg per tablet Take 1 tablet by mouth once daily.    metformin (GLUCOPHAGE) 1000 MG tablet Take 1,000 mg by mouth 2 (two) times daily with meals.    ondansetron (ZOFRAN) 8 MG tablet      Family History       Problem Relation (Age of Onset)    Diabetes Father    Hypertension Father          Tobacco Use    Smoking status: Former     Types: Cigarettes     Quit date:      Years since quittin.1    Smokeless tobacco: Never   Substance and Sexual Activity    Alcohol use: No    Drug use: No    Sexual activity: Never     Review of Systems   Constitutional: Negative.    HENT: Negative.     Eyes: Negative.    Respiratory: Negative.     Cardiovascular: Negative.    Gastrointestinal: Negative.    Endocrine: Negative.    Genitourinary: Negative.    Musculoskeletal:  Positive for arthralgias and gait problem.   Skin:  Positive for color change.   Allergic/Immunologic: Negative.    Psychiatric/Behavioral: Negative.     Objective:     Vital Signs (Most Recent):  Temp: 98.3 °F (36.8 °C) (23 1306)  Pulse: 105 (23)  Resp: 18 (23)  BP: (!) 176/74 (23)  SpO2: 95 % (23)   Vital Signs (24h Range):  Temp:  [98.3 °F (36.8 °C)] 98.3 °F (36.8 °C)  Pulse:  [105-112] 105  Resp:  [17-18] 18  SpO2:  [95 %-97 %] 95 %  BP: (168-176)/(68-74) 176/74     Weight: 73.9 kg (163 lb)  Body mass index is 27.98 kg/m².    Physical Exam  Vitals and nursing note reviewed.   Constitutional:       General: She is not in acute distress.     Appearance: Normal appearance. She is not ill-appearing.   HENT:      Head: Normocephalic and atraumatic.      Nose: Nose normal.      Mouth/Throat:      Mouth: Mucous membranes are moist. "   Eyes:      Extraocular Movements: Extraocular movements intact.   Cardiovascular:      Rate and Rhythm: Normal rate.      Pulses: Normal pulses.      Heart sounds: No murmur heard.  Pulmonary:      Effort: Pulmonary effort is normal. No respiratory distress.   Abdominal:      General: Abdomen is flat.      Palpations: Abdomen is soft.      Tenderness: There is no abdominal tenderness.   Musculoskeletal:      Right lower leg: Edema present.      Left lower leg: No edema.   Skin:     General: Skin is warm.   Neurological:      General: No focal deficit present.      Mental Status: She is alert.   Psychiatric:         Mood and Affect: Mood normal.   I could not palpate or doppler a pulse on the patient. Per ED provider, she has a monophasic, faint dopplerable DP.           Significant Labs: All pertinent labs within the past 24 hours have been reviewed.    Significant Imaging: I have reviewed all pertinent imaging results/findings within the past 24 hours.    Assessment/Plan:     * Wet gangrene  Presented with worsening right foot pain/swelling and discoloration  Concern for underlying infection  Continue vancomycin/Zosyn  Vascular surgery consult  NPO after midnight    Sepsis  This patient does have evidence of infective focus  My overall impression is sepsis.  Source: Skin and Soft Tissue (location right foot)  Antibiotics given-   Antibiotics (72h ago, onward)    Start     Stop Route Frequency Ordered    02/27/23 2100  vancomycin 1.5 g in dextrose 5 % 250 mL IVPB (ready to mix)         -- IV Once 02/27/23 1956 02/27/23 2054  vancomycin - pharmacy to dose  (vancomycin IVPB)        See Mark for full Linked Orders Report.    -- IV pharmacy to manage frequency 02/27/23 1954 02/27/23 2000  piperacillin-tazobactam (ZOSYN) 4.5 g in dextrose 5 % in water (D5W) 5 % 100 mL IVPB (MB+)         -- IV Every 8 hours (non-standard times) 02/27/23 1954        Latest lactate reviewed-  Recent Labs   Lab 02/27/23  7445    LACTATE 2.5*     Organ dysfunction indicated by NA    Fluid challenge Not needed - patient is not hypotensive      Post- resuscitation assessment Yes Perfusion exam was performed within 6 hours of septic shock presentation after bolus shows Adequate tissue perfusion assessed by non-invasive monitoring       Will Not start Pressors- Levophed for MAP of 65  Source control achieved by: Abx    History of DVT (deep vein thrombosis)  Not on AC.  No acute issues.      Type 2 diabetes mellitus  Patient's FSGs are uncontrolled due to hyperglycemia on current medication regimen.  Last A1c reviewed-   Lab Results   Component Value Date    HGBA1C 9.7 (H) 03/20/2019     Most recent fingerstick glucose reviewed-   Recent Labs   Lab 02/27/23  1440 02/27/23 2059   POCTGLUCOSE 132* 84     Current correctional scale  Low  Maintain anti-hyperglycemic dose as follows-   Antihyperglycemics (From admission, onward)    Start     Stop Route Frequency Ordered    02/28/23 0700  insulin aspart protamine-insulin aspart (NovoLOG 70/30) injection         -- SubQ Every morning 02/27/23 2200 02/27/23 2301  insulin aspart U-100 pen 0-5 Units         -- SubQ Before meals & nightly PRN 02/27/23 2201 02/27/23 2300  insulin aspart protamine-insulin aspart (NovoLOG 70/30) injection         -- SubQ Nightly 02/27/23 2200          Essential hypertension  Resume home medications      PVD (peripheral vascular disease)  Resume home medications  Consult vascular surgery      VTE Risk Mitigation (From admission, onward)         Ordered     heparin (porcine) injection 5,000 Units  Every 8 hours         02/27/23 2200     IP VTE HIGH RISK PATIENT  Once         02/27/23 2200     Place sequential compression device  Until discontinued         02/27/23 2200                   Ramana Lopez MD  Department of Hospital Medicine   [facility] - Emergency Dept

## 2023-02-28 NOTE — ASSESSMENT & PLAN NOTE
This patient does have evidence of infective focus  My overall impression is sepsis.  Source: Skin and Soft Tissue (location right foot)  Antibiotics given-   Antibiotics (72h ago, onward)    Start     Stop Route Frequency Ordered    02/27/23 2100  vancomycin 1.5 g in dextrose 5 % 250 mL IVPB (ready to mix)         -- IV Once 02/27/23 1956 02/27/23 2054  vancomycin - pharmacy to dose  (vancomycin IVPB)        See Hyperspace for full Linked Orders Report.    -- IV pharmacy to manage frequency 02/27/23 1954 02/27/23 2000  piperacillin-tazobactam (ZOSYN) 4.5 g in dextrose 5 % in water (D5W) 5 % 100 mL IVPB (MB+)         -- IV Every 8 hours (non-standard times) 02/27/23 1954        Latest lactate reviewed-  Recent Labs   Lab 02/27/23  1427   LACTATE 2.5*     Organ dysfunction indicated by NA    Fluid challenge Not needed - patient is not hypotensive      Post- resuscitation assessment Yes Perfusion exam was performed within 6 hours of septic shock presentation after bolus shows Adequate tissue perfusion assessed by non-invasive monitoring       Will Not start Pressors- Levophed for MAP of 65  Source control achieved by: Abx

## 2023-02-28 NOTE — ASSESSMENT & PLAN NOTE
-Presented with worsening right foot pain/swelling and discoloration  -Chart reviewed. Had recent LE angiogram performed on 02/23/2023  -XR right foot reviewed and personally intercepted by me. Appears to have DJD and spurs on the calcaneus. No obvious fractures or dislocations   -Concern for underlying infection  -Vascular surgery consult  -Podiatry consulted  -Continue vancomycin/Zosyn, day 2

## 2023-02-28 NOTE — NURSING
Critical lab called to me at 07:25 for a glucose drawn at 05:49. Currently pt asymptomatic, siting in chair with  at bedside. Current glucose 67. IV dextrose not immediately available in pyxis. Pt given 1 mg glucagon, recheck glucose is 159.

## 2023-02-28 NOTE — ASSESSMENT & PLAN NOTE
-Blood glucose was 49 on 02/28. Pt asymptomatic. Hx of DM type 2  -likely due to being NPO for evaluation  -Blood glucose improved with glucagon  -Started on NS with D5 while NPO  -Continue to monitor

## 2023-02-28 NOTE — ASSESSMENT & PLAN NOTE
This patient does have evidence of infective focus  My overall impression is sepsis.  Source: Skin and Soft Tissue (location right foot)  Antibiotics given-   Antibiotics (72h ago, onward)    Start     Stop Route Frequency Ordered    02/28/23 2200  vancomycin (VANCOCIN) 1,000 mg in dextrose 5 % (D5W) 250 mL IVPB (Vial-Mate)         -- IV Every 24 hours (non-standard times) 02/28/23 0200    02/27/23 2054  vancomycin - pharmacy to dose  (vancomycin IVPB)        See Mark for full Linked Orders Report.    -- IV pharmacy to manage frequency 02/27/23 1954 02/27/23 2000  piperacillin-tazobactam (ZOSYN) 4.5 g in dextrose 5 % in water (D5W) 5 % 100 mL IVPB (MB+)         -- IV Every 8 hours (non-standard times) 02/27/23 1954        Latest lactate reviewed-  Recent Labs   Lab 02/27/23  1427 02/28/23  0721   LACTATE 2.5* 1.5     Organ dysfunction indicated by NA    Fluid challenge Not needed - patient is not hypotensive      Post- resuscitation assessment Yes Perfusion exam was performed within 6 hours of septic shock presentation after bolus shows Adequate tissue perfusion assessed by non-invasive monitoring       Will Not start Pressors- Levophed for MAP of 65  Source control achieved by: Abx    -Meets sepsis criteria with tachycardia, tachy penia and source being the right foot on admit  -No leukocytosis. LA elevated at 2.5 on admit, trended down   -XR right foot as above  -Right foot appears to have wet gangrene on exam  -Blood cx  NGTD  -Started on vanc/zosyn

## 2023-02-28 NOTE — PLAN OF CARE
AO x4. Medicated for right foot pain. Iv abx received. Hypoglycemic this morning but elevated after resuming diet. Level 3 with walker. Up to chair for 6 hours. NPO at midnight for stress test in am.      Problem: Adult Inpatient Plan of Care  Goal: Plan of Care Review  Outcome: Ongoing, Progressing  Goal: Patient-Specific Goal (Individualized)  Outcome: Ongoing, Progressing  Goal: Absence of Hospital-Acquired Illness or Injury  Outcome: Ongoing, Progressing  Goal: Optimal Comfort and Wellbeing  Outcome: Ongoing, Progressing  Goal: Readiness for Transition of Care  Outcome: Ongoing, Progressing     Problem: Diabetes Comorbidity  Goal: Blood Glucose Level Within Targeted Range  Outcome: Ongoing, Progressing     Problem: Adjustment to Illness (Sepsis/Septic Shock)  Goal: Optimal Coping  Outcome: Ongoing, Progressing     Problem: Bleeding (Sepsis/Septic Shock)  Goal: Absence of Bleeding  Outcome: Ongoing, Progressing     Problem: Glycemic Control Impaired (Sepsis/Septic Shock)  Goal: Blood Glucose Level Within Desired Range  Outcome: Ongoing, Progressing     Problem: Infection Progression (Sepsis/Septic Shock)  Goal: Absence of Infection Signs and Symptoms  Outcome: Ongoing, Progressing     Problem: Nutrition Impaired (Sepsis/Septic Shock)  Goal: Optimal Nutrition Intake  Outcome: Ongoing, Progressing     Problem: Fall Injury Risk  Goal: Absence of Fall and Fall-Related Injury  Outcome: Ongoing, Progressing     Problem: Skin Injury Risk Increased  Goal: Skin Health and Integrity  Outcome: Ongoing, Progressing

## 2023-02-28 NOTE — ASSESSMENT & PLAN NOTE
Advance Care Planning     Date: 02/28/2023    Code Status  I engaged the the patient in a conversation about the patient's preferences for care  at the very end of life. The patient wishes to have CPR and other invasive treatments performed when her heart and/or breathing stops. I communicated to the patient that her wishes align with full code status and she verbalized understanding and agrees. I spent a total of 16 minutes engaging the patient in this advance care planning discussion.            Code Status: Full Code

## 2023-03-01 ENCOUNTER — ANESTHESIA EVENT (OUTPATIENT)
Dept: SURGERY | Facility: HOSPITAL | Age: 83
DRG: 853 | End: 2023-03-01
Payer: MEDICARE

## 2023-03-01 PROBLEM — I70.229 CRITICAL LIMB ISCHEMIA WITH HISTORY OF REVASCULARIZATION OF SAME EXTREMITY: Status: ACTIVE | Noted: 2023-02-27

## 2023-03-01 PROBLEM — Z98.890 CRITICAL LIMB ISCHEMIA WITH HISTORY OF REVASCULARIZATION OF SAME EXTREMITY: Status: ACTIVE | Noted: 2023-02-27

## 2023-03-01 LAB
ANION GAP SERPL CALC-SCNC: 11 MMOL/L (ref 8–16)
APTT BLDCRRT: 28.3 SEC (ref 21–32)
APTT BLDCRRT: 38.1 SEC (ref 21–32)
BASOPHILS # BLD AUTO: 0.02 K/UL (ref 0–0.2)
BASOPHILS NFR BLD: 0.2 % (ref 0–1.9)
BUN SERPL-MCNC: 26 MG/DL (ref 8–23)
CALCIUM SERPL-MCNC: 9.3 MG/DL (ref 8.7–10.5)
CHLORIDE SERPL-SCNC: 101 MMOL/L (ref 95–110)
CO2 SERPL-SCNC: 24 MMOL/L (ref 23–29)
CREAT SERPL-MCNC: 1.6 MG/DL (ref 0.5–1.4)
CV STRESS BASE HR: 81 BPM
DIASTOLIC BLOOD PRESSURE: 45 MMHG
DIFFERENTIAL METHOD: ABNORMAL
EOSINOPHIL # BLD AUTO: 0.3 K/UL (ref 0–0.5)
EOSINOPHIL NFR BLD: 3.7 % (ref 0–8)
ERYTHROCYTE [DISTWIDTH] IN BLOOD BY AUTOMATED COUNT: 14.4 % (ref 11.5–14.5)
EST. GFR  (NO RACE VARIABLE): 32 ML/MIN/1.73 M^2
GLUCOSE SERPL-MCNC: 112 MG/DL (ref 70–110)
HCT VFR BLD AUTO: 30.3 % (ref 37–48.5)
HGB BLD-MCNC: 9.3 G/DL (ref 12–16)
IMM GRANULOCYTES # BLD AUTO: 0.02 K/UL (ref 0–0.04)
IMM GRANULOCYTES NFR BLD AUTO: 0.2 % (ref 0–0.5)
INR PPP: 1.1 (ref 0.8–1.2)
LYMPHOCYTES # BLD AUTO: 1.3 K/UL (ref 1–4.8)
LYMPHOCYTES NFR BLD: 15.9 % (ref 18–48)
MAGNESIUM SERPL-MCNC: 2.3 MG/DL (ref 1.6–2.6)
MCH RBC QN AUTO: 24.1 PG (ref 27–31)
MCHC RBC AUTO-ENTMCNC: 30.7 G/DL (ref 32–36)
MCV RBC AUTO: 79 FL (ref 82–98)
MONOCYTES # BLD AUTO: 0.6 K/UL (ref 0.3–1)
MONOCYTES NFR BLD: 6.5 % (ref 4–15)
NEUTROPHILS # BLD AUTO: 6.2 K/UL (ref 1.8–7.7)
NEUTROPHILS NFR BLD: 73.5 % (ref 38–73)
NRBC BLD-RTO: 0 /100 WBC
OHS CV CPX 85 PERCENT MAX PREDICTED HEART RATE MALE: 114
OHS CV CPX MAX PREDICTED HEART RATE: 134
OHS CV CPX PATIENT IS FEMALE: 1
OHS CV CPX PATIENT IS MALE: 0
OHS CV CPX PEAK DIASTOLIC BLOOD PRESSURE: 42 MMHG
OHS CV CPX PEAK HEAR RATE: 89 BPM
OHS CV CPX PEAK RATE PRESSURE PRODUCT: 7387
OHS CV CPX PEAK SYSTOLIC BLOOD PRESSURE: 83 MMHG
OHS CV CPX PERCENT MAX PREDICTED HEART RATE ACHIEVED: 66
OHS CV CPX RATE PRESSURE PRODUCT PRESENTING: 8667
PHOSPHATE SERPL-MCNC: 4.1 MG/DL (ref 2.7–4.5)
PLATELET # BLD AUTO: 381 K/UL (ref 150–450)
PMV BLD AUTO: 10.1 FL (ref 9.2–12.9)
POCT GLUCOSE: 125 MG/DL (ref 70–110)
POCT GLUCOSE: 283 MG/DL (ref 70–110)
POCT GLUCOSE: 337 MG/DL (ref 70–110)
POTASSIUM SERPL-SCNC: 4.3 MMOL/L (ref 3.5–5.1)
PROTHROMBIN TIME: 11.5 SEC (ref 9–12.5)
RBC # BLD AUTO: 3.86 M/UL (ref 4–5.4)
SODIUM SERPL-SCNC: 136 MMOL/L (ref 136–145)
SYSTOLIC BLOOD PRESSURE: 107 MMHG
VANCOMYCIN SERPL-MCNC: 10.1 UG/ML
WBC # BLD AUTO: 8.45 K/UL (ref 3.9–12.7)

## 2023-03-01 PROCEDURE — 25000003 PHARM REV CODE 250: Performed by: STUDENT IN AN ORGANIZED HEALTH CARE EDUCATION/TRAINING PROGRAM

## 2023-03-01 PROCEDURE — 80048 BASIC METABOLIC PNL TOTAL CA: CPT | Performed by: STUDENT IN AN ORGANIZED HEALTH CARE EDUCATION/TRAINING PROGRAM

## 2023-03-01 PROCEDURE — 85730 THROMBOPLASTIN TIME PARTIAL: CPT | Mod: 91 | Performed by: STUDENT IN AN ORGANIZED HEALTH CARE EDUCATION/TRAINING PROGRAM

## 2023-03-01 PROCEDURE — 63600175 PHARM REV CODE 636 W HCPCS: Performed by: STUDENT IN AN ORGANIZED HEALTH CARE EDUCATION/TRAINING PROGRAM

## 2023-03-01 PROCEDURE — 36415 COLL VENOUS BLD VENIPUNCTURE: CPT | Performed by: SURGERY

## 2023-03-01 PROCEDURE — 25000003 PHARM REV CODE 250: Performed by: EMERGENCY MEDICINE

## 2023-03-01 PROCEDURE — 99233 PR SUBSEQUENT HOSPITAL CARE,LEVL III: ICD-10-PCS | Mod: 57,,, | Performed by: SURGERY

## 2023-03-01 PROCEDURE — 36415 COLL VENOUS BLD VENIPUNCTURE: CPT | Performed by: STUDENT IN AN ORGANIZED HEALTH CARE EDUCATION/TRAINING PROGRAM

## 2023-03-01 PROCEDURE — 99232 PR SUBSEQUENT HOSPITAL CARE,LEVL II: ICD-10-PCS | Mod: ,,, | Performed by: PODIATRIST

## 2023-03-01 PROCEDURE — 85610 PROTHROMBIN TIME: CPT | Performed by: SURGERY

## 2023-03-01 PROCEDURE — 99233 PR SUBSEQUENT HOSPITAL CARE,LEVL III: ICD-10-PCS | Mod: 25,,, | Performed by: INTERNAL MEDICINE

## 2023-03-01 PROCEDURE — 99233 SBSQ HOSP IP/OBS HIGH 50: CPT | Mod: 57,,, | Performed by: SURGERY

## 2023-03-01 PROCEDURE — 83735 ASSAY OF MAGNESIUM: CPT | Performed by: STUDENT IN AN ORGANIZED HEALTH CARE EDUCATION/TRAINING PROGRAM

## 2023-03-01 PROCEDURE — 63600175 PHARM REV CODE 636 W HCPCS: Performed by: INTERNAL MEDICINE

## 2023-03-01 PROCEDURE — 36415 COLL VENOUS BLD VENIPUNCTURE: CPT

## 2023-03-01 PROCEDURE — 63600175 PHARM REV CODE 636 W HCPCS: Performed by: SURGERY

## 2023-03-01 PROCEDURE — 85025 COMPLETE CBC W/AUTO DIFF WBC: CPT | Performed by: SURGERY

## 2023-03-01 PROCEDURE — 25000003 PHARM REV CODE 250: Performed by: INTERNAL MEDICINE

## 2023-03-01 PROCEDURE — 99233 SBSQ HOSP IP/OBS HIGH 50: CPT | Mod: 25,,, | Performed by: INTERNAL MEDICINE

## 2023-03-01 PROCEDURE — 85730 THROMBOPLASTIN TIME PARTIAL: CPT | Performed by: SURGERY

## 2023-03-01 PROCEDURE — 80202 ASSAY OF VANCOMYCIN: CPT | Performed by: STUDENT IN AN ORGANIZED HEALTH CARE EDUCATION/TRAINING PROGRAM

## 2023-03-01 PROCEDURE — 84100 ASSAY OF PHOSPHORUS: CPT | Performed by: STUDENT IN AN ORGANIZED HEALTH CARE EDUCATION/TRAINING PROGRAM

## 2023-03-01 PROCEDURE — 63600175 PHARM REV CODE 636 W HCPCS: Performed by: EMERGENCY MEDICINE

## 2023-03-01 PROCEDURE — 20600001 HC STEP DOWN PRIVATE ROOM

## 2023-03-01 PROCEDURE — 11000001 HC ACUTE MED/SURG PRIVATE ROOM

## 2023-03-01 PROCEDURE — 99232 SBSQ HOSP IP/OBS MODERATE 35: CPT | Mod: ,,, | Performed by: PODIATRIST

## 2023-03-01 PROCEDURE — 85730 THROMBOPLASTIN TIME PARTIAL: CPT | Mod: 91

## 2023-03-01 RX ORDER — HEPARIN SODIUM,PORCINE/D5W 25000/250
0-40 INTRAVENOUS SOLUTION INTRAVENOUS CONTINUOUS
Status: DISCONTINUED | OUTPATIENT
Start: 2023-03-01 | End: 2023-03-02

## 2023-03-01 RX ORDER — REGADENOSON 0.08 MG/ML
0.4 INJECTION, SOLUTION INTRAVENOUS ONCE
Status: COMPLETED | OUTPATIENT
Start: 2023-03-01 | End: 2023-03-01

## 2023-03-01 RX ORDER — INSULIN ASPART 100 [IU]/ML
15 INJECTION, SUSPENSION SUBCUTANEOUS NIGHTLY
Status: DISCONTINUED | OUTPATIENT
Start: 2023-03-01 | End: 2023-03-02

## 2023-03-01 RX ADMIN — CILOSTAZOL 50 MG: 50 TABLET ORAL at 09:03

## 2023-03-01 RX ADMIN — INSULIN ASPART 3 UNITS: 100 INJECTION, SOLUTION INTRAVENOUS; SUBCUTANEOUS at 05:03

## 2023-03-01 RX ADMIN — ATORVASTATIN CALCIUM 40 MG: 40 TABLET, FILM COATED ORAL at 11:03

## 2023-03-01 RX ADMIN — HYDROCODONE BITARTRATE AND ACETAMINOPHEN 1 TABLET: 5; 325 TABLET ORAL at 09:03

## 2023-03-01 RX ADMIN — CARVEDILOL 12.5 MG: 12.5 TABLET, FILM COATED ORAL at 09:03

## 2023-03-01 RX ADMIN — PIPERACILLIN SODIUM AND TAZOBACTAM SODIUM 4.5 G: 4; .5 INJECTION, POWDER, FOR SOLUTION INTRAVENOUS at 06:03

## 2023-03-01 RX ADMIN — HYDROCHLOROTHIAZIDE 25 MG: 25 TABLET ORAL at 08:03

## 2023-03-01 RX ADMIN — HEPARIN SODIUM 5000 UNITS: 5000 INJECTION INTRAVENOUS; SUBCUTANEOUS at 05:03

## 2023-03-01 RX ADMIN — HYDROCODONE BITARTRATE AND ACETAMINOPHEN 1 TABLET: 5; 325 TABLET ORAL at 01:03

## 2023-03-01 RX ADMIN — HEPARIN SODIUM 12 UNITS/KG/HR: 10000 INJECTION, SOLUTION INTRAVENOUS at 01:03

## 2023-03-01 RX ADMIN — CILOSTAZOL 50 MG: 50 TABLET ORAL at 08:03

## 2023-03-01 RX ADMIN — AMLODIPINE BESYLATE 10 MG: 10 TABLET ORAL at 08:03

## 2023-03-01 RX ADMIN — LOSARTAN POTASSIUM 100 MG: 50 TABLET, FILM COATED ORAL at 08:03

## 2023-03-01 RX ADMIN — CARVEDILOL 12.5 MG: 12.5 TABLET, FILM COATED ORAL at 08:03

## 2023-03-01 RX ADMIN — REGADENOSON 0.4 MG: 0.08 INJECTION, SOLUTION INTRAVENOUS at 11:03

## 2023-03-01 RX ADMIN — HYDROCODONE BITARTRATE AND ACETAMINOPHEN 1 TABLET: 5; 325 TABLET ORAL at 07:03

## 2023-03-01 RX ADMIN — PIPERACILLIN SODIUM AND TAZOBACTAM SODIUM 4.5 G: 4; .5 INJECTION, POWDER, FOR SOLUTION INTRAVENOUS at 02:03

## 2023-03-01 RX ADMIN — ASPIRIN 81 MG: 81 TABLET, COATED ORAL at 08:03

## 2023-03-01 RX ADMIN — INSULIN ASPART 2 UNITS: 100 INJECTION, SOLUTION INTRAVENOUS; SUBCUTANEOUS at 11:03

## 2023-03-01 NOTE — PROGRESS NOTES
Pharmacokinetic Assessment Follow Up: IV Vancomycin    Vancomycin serum concentration assessment(s):     Patient looks to have an active LOLY  SCr increase >0.3 in less than 24 hours.    Trough/random placed at 3/1 2100 and dose pushed back. Please redose is trough is less than < 20 mcg/dL     Desired empiric serum trough concentration is 15 to 20 mcg/mL      Pharmacy will continue to follow and monitor vancomycin.    Please contact pharmacy at extension [phone] for questions regarding this assessment.    Thank you for the consult,   Marianela Canas       Patient brief summary:  Nelsy Marino is a 82 y.o. female initiated on antimicrobial therapy with IV Vancomycin for treatment of sepsis    The patient's current regimen is [id number] mg Q24H    Drug Allergies:   Review of patient's allergies indicates:   Allergen Reactions    Motrin [ibuprofen] Itching    Zosyn [piperacillin-tazobactam] Itching       Actual Body Weight:   73.9 kg     Renal Function:   Estimated Creatinine Clearance: 26.7 mL/min (A) (based on SCr of 1.6 mg/dL (H)).,     Dialysis Method (if applicable):  N/A    CBC (last 72 hours):  Recent Labs   Lab Result Units 02/27/23  1427 02/28/23  0549 03/01/23  0938   WBC K/uL 10.57 10.99 8.45   Hemoglobin g/dL 10.2* 10.3* 9.3*   Hemoglobin A1C %  --  8.2*  --    Hematocrit % 33.4* 33.5* 30.3*   Platelets K/uL 397 406 381   Gran % % 76.8* 76.3* 73.5*   Lymph % % 13.7* 13.2* 15.9*   Mono % % 6.0 7.4 6.5   Eosinophil % % 2.9 2.3 3.7   Basophil % % 0.2 0.4 0.2   Differential Method  Automated Automated Automated       Metabolic Panel (last 72 hours):  Recent Labs   Lab Result Units 02/27/23  1427 02/28/23  0549 03/01/23  0606   Sodium mmol/L 139 139 136   Potassium mmol/L 4.6 4.3 4.3   Chloride mmol/L 105 102 101   CO2 mmol/L 25 25 24   Glucose mg/dL 138* 49* 112*   BUN mg/dL 18 19 26*   Creatinine mg/dL 1.2 1.3 1.6*   Albumin g/dL 2.9* 2.9*  --    Total Bilirubin mg/dL 0.4 0.7  --    Alkaline  Phosphatase U/L 66 61  --    AST U/L 22 22  --    ALT U/L 9* 11  --    Magnesium mg/dL  --  2.2 2.3   Phosphorus mg/dL  --  4.7* 4.1       Vancomycin Administrations:  vancomycin given in the last 96 hours                     vancomycin (VANCOCIN) 1,000 mg in dextrose 5 % (D5W) 250 mL IVPB (Vial-Mate) (mg) 1,000 mg New Bag 02/28/23 2113    vancomycin 1.5 g in dextrose 5 % 250 mL IVPB (ready to mix) (mg) 1,500 mg New Bag 02/27/23 2148                    Microbiologic Results:  Microbiology Results (last 7 days)       Procedure Component Value Units Date/Time    Blood culture #2 **CANNOT BE ORDERED STAT** [[id number]] Collected: 02/27/23 1923    Order Status: Completed Specimen: Blood from Peripheral, Antecubital, Right Updated: 02/28/23 2103     Blood Culture, Routine No Growth to date      No Growth to date    Blood culture #1 **CANNOT BE ORDERED STAT** [[id number]] Collected: 02/27/23 1428    Order Status: Completed Specimen: Blood from Peripheral, Antecubital, Left Updated: 02/28/23 1503     Blood Culture, Routine No Growth to date      No Growth to date

## 2023-03-01 NOTE — ASSESSMENT & PLAN NOTE
-Cardiology consulted:  Normal myocardial perfusion scan.Pt at acceptable cardiac risk for vasc surgery/anesthesia.

## 2023-03-01 NOTE — PLAN OF CARE
Problem: Diabetes Comorbidity  Goal: Blood Glucose Level Within Targeted Range  Intervention: Monitor and Manage Glycemia  Flowsheets (Taken 3/1/2023 0328)  Glycemic Management:   blood glucose monitored   supplemental insulin given     Problem: Fall Injury Risk  Goal: Absence of Fall and Fall-Related Injury  Intervention: Identify and Manage Contributors  Flowsheets (Taken 3/1/2023 0328)  Self-Care Promotion: independence encouraged  Medication Review/Management:   medications reviewed   high-risk medications identified     Problem: Skin Injury Risk Increased  Goal: Skin Health and Integrity  Intervention: Optimize Skin Protection  Flowsheets (Taken 3/1/2023 0328)  Pressure Reduction Techniques:   frequent weight shift encouraged   pressure points protected  Pressure Reduction Devices: positioning supports utilized

## 2023-03-01 NOTE — NURSING
Patient left unit via wheelchair with transporter for procedure in Nuclear med.  No acute distress noted.

## 2023-03-01 NOTE — PROVIDER TRANSFER
Outside Transfer Acceptance Note / Regional Referral Center    Referring facility: [facility]   Referring provider: JULY HOWELL JOHN C. TRAN, HANH-MY T.  Accepting facility: [facility]  Accepting provider: Neeru Ceballos MD  Admitting provider: Neeru Ceballos MD  Reason for transfer:  Vascular Surg  Transfer diagnosis: PVD  Transfer specialty requested: Vascular Surgery  Vascular Surgery  Transfer specialty notified: yes  Transfer level: NUMBER 1-5: 2  Bed type requested: Med Surg  Isolation status: No active isolations   Admission class or status: IP- Inpatient  IP- Inpatient      Narrative     Transfer Diagnosis:  PVD  Reason for Transfer:  LE Bypass for PVD  Consultants:  Vascular Surgery  Transferring Facility:    Transferring Destination: [facility]    Ms. Nelsy Marino is a 82 y.o. female with severe PAD, hypertension, type 2 diabetes, history of DVT who presented to the  ED on 2/27 with right foot pain.  He endorses pain and discoloration since 2/7 when he had a ingrown toenail removed, which has gotten progressively worse.  Of note, he recently underwent LE angiogram by Dr. Tran on 2/23 and per the op report, he was able to get successful revascularization/resolution of the stenosis.    Upon arrival to the ER, vitals were temp 98.3F, , /86.  Labs showed WBC 10, , CRP 56.  XR of the foot was negative.  MRI of the right forefoot showed marrow edematous changes at the 5th toe, which could be periostitis vs early osteomyelitis.  Podiatry and Vascular Surgery were consulted for wet gangrene, and he was started on Vanc and Zosyn.  Podiatry wants to continue local wound care until amputation level can be determined.  Vascular Surgery wants to perform a fem-fem redo vs axillary- profunda bypass.  Cardiology was consulted for preop clearance, who felt like he was appropriate risk for  surgery and anesthesia.  Dr. Dunbar of Vascular Surgery would like him transferred to [facility] for hybrid OR surgery on 3/2.  He was started on a Heparin gtt, and his home Aspirin and Peltal were continued.  Pulses are not palpable in the RLE.  Dr. Dunbar wants to continue the Heparin gtt, and stop it 6h preop.  He will transfer to [facility] for further management.      Objective     Vitals: Temp: 98.2 °F (36.8 °C) (03/01/23 0730)  Pulse: 84 (03/01/23 0730)  Resp: 20 (03/01/23 1316)  BP: (!) 145/63 (03/01/23 0730)  SpO2: 96 % (03/01/23 0730)  Recent Labs: All pertinent labs within the past 24 hours have been reviewed.  CBC:   Recent Labs   Lab 02/27/23  1427 02/28/23  0549 03/01/23  0938   WBC 10.57 10.99 8.45   HGB 10.2* 10.3* 9.3*   HCT 33.4* 33.5* 30.3*    406 381     CMP:   Recent Labs   Lab 02/27/23  1427 02/28/23  0549 03/01/23  0606    139 136   K 4.6 4.3 4.3    102 101   CO2 25 25 24   * 49* 112*   BUN 18 19 26*   CREATININE 1.2 1.3 1.6*   CALCIUM 9.3 9.4 9.3   PROT 8.2 8.0  --    ALBUMIN 2.9* 2.9*  --    BILITOT 0.4 0.7  --    ALKPHOS 66 61  --    AST 22 22  --    ALT 9* 11  --    ANIONGAP 9 12 11     Airway:     Vent settings:     IV access:        Peripheral IV - Single Lumen 02/28/23 1500 22 G Right Upper Arm (Active)   Site Assessment Clean;Dry 02/28/23 2024   Line Status Saline locked 02/28/23 2024       Allergies:   Review of patient's allergies indicates:   Allergen Reactions    Motrin [ibuprofen] Itching    Zosyn [piperacillin-tazobactam] Itching      NPO: Yes    Anticoagulation:   Anticoagulants       Ordered     Route Frequency Start Stop    03/01/23 0915  heparin (porcine) in D5W  (MINIMAL INTENSITY heparin infusion nomogram - [facility] (Recommended Indications: Acute neurologic injury, LVAD, and other indications where anticoagulation is desired, but bleeding risk is a concern, and/or thrombolytics have been used))         IV Continuous 03/01/23 0930 --             Instructions       Valentin Lopez-  Admit to Hospital Medicine  Upon patient arrival to floor, please send SecureChat to [facility] or call extension [phone] (if no answer, do NOT leave a callback number after the beep, rather please send a SecureChat to [facility]), for Hospital Medicine admit team assignment and for additional admit orders for the patient.  Do not page the attending physician associated with the patient on arrival (this physician may not be on duty at the time of arrival).  Rather, always send a SecureChat to [facility] or call [phone] to reach the triage physician for orders and team assignment.    Neeru Ceballos MD, [facility]  Senior Physician  [facility] Medicine

## 2023-03-01 NOTE — ASSESSMENT & PLAN NOTE
-Cardiology deemed acceptable risk for LE bypass  -Rec transfer to [facility] for hybrid OR surgery 3/2/23  -Cont Heparin drip - stop 6 hrs preop  -NPO at midnight  -Pain control prn and wound care

## 2023-03-01 NOTE — NURSING
Patient returned to unit via wheelchair.  Required assistance x1 for transfer back to bed.  NAD noted.

## 2023-03-01 NOTE — PROGRESS NOTES
[facility] Medicine  Progress Note    Patient Name: Nelsy Marino  MRN: [id number]  Patient Class: IP- Inpatient   Admission Date: 2/27/2023  Length of Stay: 2 days  Attending Physician: Jackeline Peralta DO  Primary Care Provider: Juan F Garay        Subjective:     Principal Problem:Critical limb ischemia with history of revascularization of same extremity        HPI:  This is an 82-year-old female with a past medical history of severe PAD, hypertension, type 2 diabetes, history of DVT who presents with right foot pain.    Patient reports worsening right foot pain, swelling and discoloration that started after she had an ingrown toenail removed by her podiatrist.  She reports that her discoloration started since 2/7. Of note, the patient underwent an angiogram on 02/23 by vascular surgery. In the ED, she was tachycardic (112), and hypertensive.  Labs showed microcytic anemia (10.2), elevated sed rate (112), elevated CRP (56.5), elevated lactic acid (2.5).  Foot x-ray showed DJD and spurs on the calcaneus with no acute process.  Vascular surgery was consulted and recommended continuing antibiotics and evaluating in the morning.  She was given morphine 4 mg IV, vancomycin, Zosyn and was admitted for further management.      Overview/Hospital Course:   82-year-old female with a past medical history of severe PAD, hypertension, type 2 diabetes, history of DVT admitted on 02/27/2023 for gangrene of right toes. Of note, patient recent underwent LE angiogram by Dr. Tran on 02/23/23. Per OP report, was able to get successful revascularization / resolution of stenosis. XR right foot with no fracture, dislocation, bone destruction, or foreign body seen.  There is DJD and spurs on the calcaneus.  Was started on IV Zosyn and vancomycin on admit. Blood  cx with NGTD. On 02/28, patient with hypoglycemia but was asymptomatic. Improved with glucogen. Likely due to NPO status and was still receiving  insulin.  Cardiology consulted for preoperative clearance.  Recommended stress test.  Normal myocardial perfusion scan.  Cardiology deem patient at acceptable cardiac risk for [facility] surgery/anesthesia.  Vascular surgery recommended  transfer to [facility] for hybrid OR surgery 3/2/23. Plans for RLE bypass in the AM. Transfer initiated       Interval History:  No acute overnight events.  Patient remained afebrile.  No hypoglycemia this morning.  Patient is sitting in the chair, appears in no distress.  States she is hungry.    Review of Systems   Constitutional:  Negative for chills, fatigue and fever.   Respiratory:  Negative for cough and shortness of breath.    Cardiovascular:  Negative for chest pain and leg swelling.   Gastrointestinal:  Negative for abdominal pain, nausea and vomiting.   Musculoskeletal:  Positive for arthralgias and gait problem. Negative for joint swelling.   Skin:  Positive for color change.   Neurological:  Negative for dizziness, weakness and headaches.     Objective:     Vital Signs (Most Recent):  Temp: 98.2 °F (36.8 °C) (03/01/23 0730)  Pulse: 84 (03/01/23 0730)  Resp: 20 (03/01/23 1316)  BP: (!) 145/63 (03/01/23 0730)  SpO2: 96 % (03/01/23 0730)   Vital Signs (24h Range):  Temp:  [98 °F (36.7 °C)-98.2 °F (36.8 °C)] 98.2 °F (36.8 °C)  Pulse:  [84-98] 84  Resp:  [16-20] 20  SpO2:  [93 %-96 %] 96 %  BP: (114-145)/(57-65) 145/63     Weight: 73.9 kg (163 lb)  Body mass index is 27.98 kg/m².    Intake/Output Summary (Last 24 hours) at 3/1/2023 1444  Last data filed at 2/28/2023 1730  Gross per 24 hour   Intake 240 ml   Output --   Net 240 ml          Physical Exam  Vitals and nursing note reviewed.   Constitutional:       General: She is not in acute distress.     Appearance: She is obese. She is not ill-appearing.      Comments: Very pleasant on exam.  at bedside   Eyes:      Extraocular Movements: Extraocular movements intact.      Pupils: Pupils are equal, round, and reactive to light.    Cardiovascular:      Rate and Rhythm: Normal rate and regular rhythm.      Heart sounds: No murmur heard.    No gallop.   Pulmonary:      Effort: Pulmonary effort is normal. No respiratory distress.      Breath sounds: Normal breath sounds. No wheezing.   Abdominal:      General: There is no distension.      Palpations: Abdomen is soft.      Tenderness: There is no abdominal tenderness. There is no guarding.   Musculoskeletal:         General: No swelling or tenderness.      Right lower leg: Edema present.      Left lower leg: No edema.   Skin:     General: Skin is warm and dry.      Comments: Right foot with gangrenous appearing toes, from great toe and 2-3 toes   Neurological:      General: No focal deficit present.      Mental Status: She is alert and oriented to person, place, and time.      Comments: I could not palpate or doppler a pulse on the patient. On chart review, Per ED provider, she has a monophasic, faint dopplerable DP.    Psychiatric:         Mood and Affect: Mood normal.         Thought Content: Thought content normal.           Significant Labs: All pertinent labs within the past 24 hours have been reviewed.    Significant Imaging: I have reviewed all pertinent imaging results/findings within the past 24 hours.      Assessment/Plan:      * Critical limb ischemia with history of revascularization of same extremity  -Presented with worsening right foot pain/swelling and discoloration  -Chart reviewed. Had recent LE angiogram performed on 02/23/2023  -XR right foot reviewed and personally intercepted by me. Appears to have DJD and spurs on the calcaneus. No obvious fractures or dislocations   -Concern for underlying infection  -cardiology consulted for preoperative clearance:  Normal myocardial perfusion scan.Pt at acceptable cardiac risk for [facility] surgery/anesthesia.   -Vascular surgery consult: Rec transfer to [facility] for hybrid OR surgery 3/2/23 for RLE bypass tomorrow with Dr. Dunbar  -Podiatry  consulted  -Continue vancomycin/Zosyn, day 3  -Continue heparin gtt  -NPO at midnight       Sepsis  This patient does have evidence of infective focus  My overall impression is sepsis.  Source: Skin and Soft Tissue (location right foot)  Antibiotics given-   Antibiotics (72h ago, onward)    Start     Stop Route Frequency Ordered    02/28/23 2200  vancomycin (VANCOCIN) 1,000 mg in dextrose 5 % (D5W) 250 mL IVPB (Vial-Mate)         -- IV Every 24 hours (non-standard times) 02/28/23 0200    02/27/23 2054  vancomycin - pharmacy to dose  (vancomycin IVPB)        See [employer] for full Linked Orders Report.    -- IV pharmacy to manage frequency 02/27/23 1954 02/27/23 2000  piperacillin-tazobactam (ZOSYN) 4.5 g in dextrose 5 % in water (D5W) 5 % 100 mL IVPB (MB+)         -- IV Every 8 hours (non-standard times) 02/27/23 1954        Latest lactate reviewed-  Recent Labs   Lab 02/27/23  1427 02/28/23  0721   LACTATE 2.5* 1.5     Organ dysfunction indicated by NA    Fluid challenge Not needed - patient is not hypotensive      Post- resuscitation assessment Yes Perfusion exam was performed within 6 hours of septic shock presentation after bolus shows Adequate tissue perfusion assessed by non-invasive monitoring       Will Not start Pressors- Levophed for MAP of 65  Source control achieved by: Abx    -Meets sepsis criteria with tachycardia, tachy penia and source being the right foot on admit  -No leukocytosis. LA elevated at 2.5 on admit, trended down   -XR right foot as above  -Right foot appears to have wet gangrene on exam  -Blood cx  NGTD  -Started on vanc/zosyn    Hypoglycemia  -Blood glucose was 49 on 02/28. Pt asymptomatic. Hx of DM type 2  -likely due to being NPO status for evaluation  -Blood glucose improved with glucagon  -Reduced basal insulin dose given NPO status at midnight   -Continue to monitor     PVD (peripheral vascular disease)  Cont. home medications:  Aspirin, cilostazol  Consult vascular surgery:  Rec transfer to [facility] for hybrid OR surgery 3/2/23 for RLE bypass tomorrow with Dr. Dunbar    Essential hypertension  -Continue home medications:  Amlodipine 10 mg daily, hydrochlorothiazide 25 mg daily, losartan 100 mg daily      Type 2 diabetes mellitus  A1c:   Lab Results   Component Value Date    HGBA1C 8.2 (H) 02/28/2023     Meds: basal bolus insulin + SSI PRN to maintain goal 140-180  ADA diet, accuchecks ACHS, hypoglycemic protocol  Hypoglycemia on 02/28, likely due to NPO status and given basal insulin  Will reduced nightly basal insulin to 15U nightly from 25U nightly given NPO at MN order  Monitor closely, adjust as needed.       History of DVT (deep vein thrombosis)  Not on AC outpatient.      Preoperative clearance  -Cardiology consulted:  Normal myocardial perfusion scan.Pt at [facility].       Advanced care planning/counseling discussion  Advance Care Planning     Date: 02/28/2023    Code Status  I engaged the the patient in a conversation about the patient's preferences for care  at the very end of life. The patient wishes to have CPR and other invasive treatments performed when her heart and/or breathing stops. I communicated to the patient that her wishes align with full code status and she verbalized understanding and agrees. I spent a total of 16 minutes engaging the patient in this advance care planning discussion.           Code Status: Full Code        VTE Risk Mitigation (From admission, onward)         Ordered     heparin 25,000 units in dextrose 5% 250 ml (100 units/mL) infusion MINIMAL INTENSITY nomogram - [facility]  Continuous        Question Answer Comment   Heparin Infusion Adjustment (DO NOT MODIFY ANSWER) [email or web address]  INTENSITY nomogram for [facility] [email or web address]    Begin at (in units/kg/hr) 12        03/01/23 0915     IP VTE HIGH RISK PATIENT  Once         02/27/23 2200     Place sequential compression  device  Until discontinued         02/27/23 2200                Discharge Planning   JUAN ALBERTO:      Code Status: Full Code   Is the patient medically ready for discharge?:     Reason for patient still in hospital (select all that apply): Patient trending condition, Treatment, Consult recommendations and Other (specify) awaiting transfer to [facility]  Discharge Plan A: Home with family, Home Health                  Jackeline Peralta DO  Department of Hospital Medicine   [facility]

## 2023-03-01 NOTE — ASSESSMENT & PLAN NOTE
A1c:   Lab Results   Component Value Date    HGBA1C 8.2 (H) 02/28/2023     Meds: basal bolus insulin + SSI PRN to maintain goal 140-180  ADA diet, accuchecks ACHS, hypoglycemic protocol  Hypoglycemia on 02/28, likely due to NPO status and given basal insulin  Will reduced nightly basal insulin to 15U nightly from 25U nightly given NPO at MN order  Monitor closely, adjust as needed.

## 2023-03-01 NOTE — SUBJECTIVE & OBJECTIVE
Interval History:  No acute overnight events.  Patient remained afebrile.  No hypoglycemia this morning.  Patient is sitting in the chair, appears in no distress.  States she is hungry.    Review of Systems   Constitutional:  Negative for chills, fatigue and fever.   Respiratory:  Negative for cough and shortness of breath.    Cardiovascular:  Negative for chest pain and leg swelling.   Gastrointestinal:  Negative for abdominal pain, nausea and vomiting.   Musculoskeletal:  Positive for arthralgias and gait problem. Negative for joint swelling.   Skin:  Positive for color change.   Neurological:  Negative for dizziness, weakness and headaches.     Objective:     Vital Signs (Most Recent):  Temp: 98.2 °F (36.8 °C) (03/01/23 0730)  Pulse: 84 (03/01/23 0730)  Resp: 20 (03/01/23 1316)  BP: (!) 145/63 (03/01/23 0730)  SpO2: 96 % (03/01/23 0730)   Vital Signs (24h Range):  Temp:  [98 °F (36.7 °C)-98.2 °F (36.8 °C)] 98.2 °F (36.8 °C)  Pulse:  [84-98] 84  Resp:  [16-20] 20  SpO2:  [93 %-96 %] 96 %  BP: (114-145)/(57-65) 145/63     Weight: 73.9 kg (163 lb)  Body mass index is 27.98 kg/m².    Intake/Output Summary (Last 24 hours) at 3/1/2023 1444  Last data filed at 2/28/2023 1730  Gross per 24 hour   Intake 240 ml   Output --   Net 240 ml          Physical Exam  Vitals and nursing note reviewed.   Constitutional:       General: She is not in acute distress.     Appearance: She is obese. She is not ill-appearing.      Comments: Very pleasant on exam.  at bedside   Eyes:      Extraocular Movements: Extraocular movements intact.      Pupils: Pupils are equal, round, and reactive to light.   Cardiovascular:      Rate and Rhythm: Normal rate and regular rhythm.      Heart sounds: No murmur heard.    No gallop.   Pulmonary:      Effort: Pulmonary effort is normal. No respiratory distress.      Breath sounds: Normal breath sounds. No wheezing.   Abdominal:      General: There is no distension.      Palpations: Abdomen is  soft.      Tenderness: There is no abdominal tenderness. There is no guarding.   Musculoskeletal:         General: No swelling or tenderness.      Right lower leg: Edema present.      Left lower leg: No edema.   Skin:     General: Skin is warm and dry.      Comments: Right foot with gangrenous appearing toes, from great toe and 2-3 toes   Neurological:      General: No focal deficit present.      Mental Status: She is alert and oriented to person, place, and time.      Comments: I could not palpate or doppler a pulse on the patient. On chart review, Per ED provider, she has a monophasic, faint dopplerable DP.    Psychiatric:         Mood and Affect: Mood normal.         Thought Content: Thought content normal.           Significant Labs: All pertinent labs within the past 24 hours have been reviewed.    Significant Imaging: I have reviewed all pertinent imaging results/findings within the past 24 hours.

## 2023-03-01 NOTE — ASSESSMENT & PLAN NOTE
Poor premorbid exercise capacity.  RCRI=3, 10.1% 30d periop MACE risk-> will check Debby MPI today for further risk stratification.

## 2023-03-01 NOTE — ASSESSMENT & PLAN NOTE
-Presented with worsening right foot pain/swelling and discoloration  -Chart reviewed. Had recent LE angiogram performed on 02/23/2023  -XR right foot reviewed and personally intercepted by me. Appears to have DJD and spurs on the calcaneus. No obvious fractures or dislocations   -Concern for underlying infection  -cardiology consulted for preoperative clearance:  Normal myocardial perfusion scan.Pt at acceptable cardiac risk for [facility] surgery/anesthesia.   -Vascular surgery consult: Rec transfer to [facility] [place] [facility] for hybrid OR surgery 3/2/23 for RLE bypass tomorrow with Dr. Dunbar  -Podiatry consulted  -Continue vancomycin/Zosyn, day 3  -Continue heparin gtt  -NPO at midnight

## 2023-03-01 NOTE — PROGRESS NOTES
[facility]  Vascular Surgery  Progress Note    Patient Name: Nelsy Marino  MRN: [id number]  Admission Date: 2/27/2023  Primary Care Provider: Juan F Garay    Subjective:     Interval History: obtained stress test today.    Post-Op Info:  * No surgery found *         Past Medical History:   Diagnosis Date    CHF (congestive heart failure)     Diabetes mellitus     DVT (deep venous thrombosis)     Hypertension     Miscarriage        Past Surgical History:   Procedure Laterality Date     femoral vascular surgery       ANGIOGRAPHY OF LOWER EXTREMITY Right 2/23/2023    Procedure: Angiogram Extremity Unilateral;  Surgeon: Danny Dunbar MD;  Location: [facility];  Service: Vascular;  Laterality: Right;  RN PHONE PREOP 2/20/2023----CK CONSENT    CREATION OF ILIOFEMORAL ARTERY BYPASS Right 3/20/2019    Procedure: CREATION, BYPASS, ARTERIAL, ILIAC TO FEMORAL, RIGHT LOWER EXTREMITY, RIGHT PROFUNDAPLASTY;  Surgeon: Danny Dunbar MD;  Location: [facility];  Service: Vascular;  Laterality: Right;  11:00AM START PER ANNE  RN PREOP 3/13/2019  ACT'S, CELL SAVER, GRAFTS, BOOK WATER---NEED H/P  CONSENT IN AM------HGBA1C    HYSTERECTOMY      ?unsure cervix present    INCISION AND DRAINAGE Right 4/12/2019    Procedure: Incision and Drainage - R groin washout, possible muscle flap;  Surgeon: Danny Dunbar MD;  Location: [facility] OR [address] [place] [address];  Service: Vascular;  Laterality: Right;  groin washout    INCISION AND DRAINAGE OF GROIN Right 5/3/2019    Procedure: Incision and Drainage R groin;  Surgeon: Danny Dunbar MD;  Location: [facility] OR [address] [place] [address];  Service: Vascular;  Laterality: Right;       Review of patient's allergies indicates:   Allergen Reactions    Motrin [ibuprofen] Itching    Zosyn [piperacillin-tazobactam] Itching       No current facility-administered medications on file prior to encounter.     Current Outpatient Medications on File Prior to Encounter   Medication Sig    amlodipine  "(NORVASC) 10 MG tablet Take 10 mg by mouth once daily.    aspirin (ECOTRIN) 81 MG EC tablet Take 81 mg by mouth once daily.    BLOOD SUGAR DIAGNOSTIC (TRUE METRIX GLUCOSE TEST STRIP MISC) by Misc.(Non-Drug; Combo Route) route.    carvediloL (COREG) 12.5 MG tablet Take 12.5 mg by mouth 2 (two) times daily.    cilostazol (PLETAL) 50 MG Tab Take 1 tablet (50 mg total) by mouth 2 (two) times daily. If patient tolerate medication after 4 weeks, increase dose to 100 mg twice daily.    glipiZIDE (GLUCOTROL) 10 MG tablet Take 10 mg by mouth 2 (two) times daily before meals.    insulin aspart protamine-insulin aspart (NOVOLOG 70/30) 100 unit/mL (70-30) InPn pen Inject 50 Units into the skin every morning.    insulin aspart protamine-insulin aspart (NOVOLOG 70/30) 100 unit/mL (70-30) InPn pen Inject 50 Units into the skin every evening.    insulin syringe-needle,dispos. 0.3 mL 30 gauge x 5/16" Syrg by Misc.(Non-Drug; Combo Route) route.    losartan-hydrochlorothiazide 100-25 mg (HYZAAR) 100-25 mg per tablet Take 1 tablet by mouth once daily.    metformin (GLUCOPHAGE) 1000 MG tablet Take 1,000 mg by mouth 2 (two) times daily with meals.    ondansetron (ZOFRAN) 8 MG tablet      Family History       Problem Relation (Age of Onset)    Diabetes Father    Hypertension Father          Tobacco Use    Smoking status: Former     Types: Cigarettes     Quit date: 1980     Years since quittin.1    Smokeless tobacco: Never   Substance and Sexual Activity    Alcohol use: No    Drug use: No    Sexual activity: Never     Review of Systems   Gastrointestinal:  Negative for melena.   Genitourinary:  Negative for hematuria.   Objective:     Vital Signs (Most Recent):  Temp: 98.2 °F (36.8 °C) (23)  Pulse: 84 (23)  Resp: 20 (23 1316)  BP: (!) 145/63 (23)  SpO2: 96 % (23)   Vital Signs (24h Range):  Temp:  [98 °F (36.7 °C)-98.2 °F (36.8 °C)] 98.2 °F (36.8 °C)  Pulse:  [84-98] " 84  Resp:  [16-20] 20  SpO2:  [93 %-96 %] 96 %  BP: (114-145)/(57-65) 145/63     Weight: 73.9 kg (163 lb)  Body mass index is 27.98 kg/m².    SpO2: 96 %         Intake/Output Summary (Last 24 hours) at 3/1/2023 1353  Last data filed at 2/28/2023 1730  Gross per 24 hour   Intake 240 ml   Output --   Net 240 ml         Lines/Drains/Airways       Peripheral Intravenous Line  Duration                  Peripheral IV - Single Lumen 02/28/23 1500 22 G Right Upper Arm <1 day                  Exam unchanged vs 2/28/23  Physical Exam  Constitutional:       General: She is not in acute distress.     Appearance: Normal appearance. She is well-developed. She is not ill-appearing, toxic-appearing or diaphoretic.   HENT:      Head: Normocephalic and atraumatic.   Eyes:      General: No scleral icterus.     Extraocular Movements: Extraocular movements intact.      Conjunctiva/sclera: Conjunctivae normal.      Pupils: Pupils are equal, round, and reactive to light.   Neck:      Vascular: No JVD.      Trachea: No tracheal deviation.   Cardiovascular:      Rate and Rhythm: Normal rate and regular rhythm.      Heart sounds: S1 normal and S2 normal. No murmur heard.    No friction rub. No gallop.   Pulmonary:      Effort: Pulmonary effort is normal. No respiratory distress.      Breath sounds: Normal breath sounds. No stridor. No wheezing, rhonchi or rales.   Chest:      Chest wall: No tenderness.   Abdominal:      General: There is no distension.      Palpations: Abdomen is soft.   Musculoskeletal:         General: No swelling or tenderness. Normal range of motion.      Cervical back: Normal range of motion and neck supple. No rigidity.      Right lower leg: No edema.      Left lower leg: No edema.      Comments: R foot dry gangrene of toes   Skin:     General: Skin is warm and dry.      Coloration: Skin is not jaundiced.   Neurological:      General: No focal deficit present.      Mental Status: She is alert and oriented to person,  place, and time.      Cranial Nerves: No cranial nerve deficit.   Psychiatric:         Mood and Affect: Mood normal.         Behavior: Behavior normal.       Current Medications:   amLODIPine  10 mg Oral Daily    aspirin  81 mg Oral Daily    atorvastatin  40 mg Oral QHS    carvediloL  12.5 mg Oral BID    cilostazoL  50 mg Oral BID    hydroCHLOROthiazide  25 mg Oral Daily    insulin aspart protamine-insulin aspart  50 Units Subcutaneous QAM    insulin aspart protamine-insulin aspart  25 Units Subcutaneous QHS    losartan  100 mg Oral Daily    piperacillin-tazobactam (ZOSYN) IVPB  4.5 g Intravenous Q8H    polyethylene glycol  17 g Oral Daily    vancomycin (VANCOCIN) IVPB  1,000 mg Intravenous Q24H      heparin (porcine) in D5W 12 Units/kg/hr (03/01/23 1308)     acetaminophen, acetaminophen, albuterol-ipratropium, aluminum-magnesium hydroxide-simethicone, bisacodyL, dextrose 10%, dextrose 10%, diphenhydrAMINE, glucagon (human recombinant), glucose, glucose, HYDROcodone-acetaminophen, insulin aspart U-100, magnesium oxide, magnesium oxide, melatonin, morphine, naloxone, ondansetron, potassium bicarbonate, potassium bicarbonate, potassium bicarbonate, potassium, sodium phosphates, potassium, sodium phosphates, potassium, sodium phosphates, prochlorperazine, simethicone, sodium chloride 0.9%, Pharmacy to dose Vancomycin consult **AND** vancomycin - pharmacy to dose    Laboratory (all labs reviewed):  CBC:  Recent Labs   Lab 02/15/23  1002 02/27/23  1427 02/28/23  0549 03/01/23  0938   WBC 10.22 10.57 10.99 8.45   Hemoglobin 10.7 L 10.2 L 10.3 L 9.3 L   Hematocrit 35.0 L 33.4 L 33.5 L 30.3 L   Platelets 395 397 406 381         CHEMISTRIES:  Recent Labs   Lab 01/30/23  0708 02/15/23  1002 02/27/23  1427 02/28/23  0549 03/01/23  0606   Glucose  --  157 H 138 H 49  H   Sodium  --  142 139 139 136   Potassium  --  4.5 4.6 4.3 4.3   BUN  --  26 H 18 19 26 H   Creatinine 1.1 1.0 1.2 1.3 1.6 H   eGFR 50 A 56 A  45 A 41 A 32 A   Calcium  --  9.3 9.3 9.4 9.3   Magnesium  --  2.3  --  2.2 2.3         CARDIAC BIOMARKERS:        COAGS:  Recent Labs   Lab 02/15/23  1002 02/27/23  1427 02/28/23  0549 03/01/23  0938   INR 1.1 1.1 1.1 1.1         LIPIDS/LFTS:  Recent Labs   Lab 02/27/23  1427 02/28/23  0549   Cholesterol  --  109 L   Triglycerides  --  58   HDL  --  38 L   LDL Cholesterol  --  59.4 L   Non-HDL Cholesterol  --  71   AST 22 22   ALT 9 L 11         BNP:        TSH:        Free T4:        Diagnostic Results:  ECG (personally reviewed and interpreted tracing(s)):  2/27/23 1447 , IMI-age indet, NSSTTW changes    Echo 2/28/23 (images prev personally reviewed and interpreted)   The left ventricle is normal in size with mild concentric hypertrophy and normal systolic function.   The estimated ejection fraction is 65%.   Grade I left ventricular diastolic dysfunction.   Normal right ventricular size with normal right ventricular systolic function.   The estimated PA systolic pressure is 37 mmHg.      Physical Exam  Constitutional:       General: She is not in acute distress.     Appearance: Normal appearance. She is well-developed. She is not ill-appearing, toxic-appearing or diaphoretic.   HENT:      Head: Normocephalic and atraumatic.   Eyes:      General: No scleral icterus.     Extraocular Movements: Extraocular movements intact.      Conjunctiva/sclera: Conjunctivae normal.      Pupils: Pupils are equal, round, and reactive to light.   Neck:      Vascular: No JVD.      Trachea: No tracheal deviation.   Cardiovascular:      Rate and Rhythm: Normal rate and regular rhythm.      Heart sounds: S1 normal and S2 normal. No murmur heard.    No friction rub. No gallop.   Pulmonary:      Effort: Pulmonary effort is normal. No respiratory distress.      Breath sounds: Normal breath sounds. No stridor. No wheezing, rhonchi or rales.   Chest:      Chest wall: No tenderness.   Abdominal:      General: There is no  distension.      Palpations: Abdomen is soft.   Musculoskeletal:         General: No swelling or tenderness. Normal range of motion.      Cervical back: Normal range of motion and neck supple. No rigidity.      Right lower leg: No edema.      Left lower leg: No edema.      Comments: R foot dry gangrene of toes   Skin:     General: Skin is warm and dry.      Coloration: Skin is not jaundiced.   Neurological:      General: No focal deficit present.      Mental Status: She is alert and oriented to person, place, and time.      Cranial Nerves: No cranial nerve deficit.   Psychiatric:         Mood and Affect: Mood normal.         Behavior: Behavior normal.       Assessment/Plan:     * Critical limb ischemia with history of revascularization of same extremity  -Cardiology deemed acceptable risk for LE bypass  -Rec transfer to [facility] [place] hybrid [place] surgery 3/2/23  -Cont Heparin drip - stop 6 hrs preop  -NPO at midnight  -Pain control prn and wound care        Danny Dunbar MD  Vascular Surgery  [facility]

## 2023-03-01 NOTE — ASSESSMENT & PLAN NOTE
Cont. home medications:  Aspirin, cilostazol  Consult vascular surgery: Rec transfer to [facility] for hybrid [place] surgery 3/2/23 for RLE bypass tomorrow with Dr. Dunbar

## 2023-03-01 NOTE — SUBJECTIVE & OBJECTIVE
Past Medical History:   Diagnosis Date    CHF (congestive heart failure)     Diabetes mellitus     DVT (deep venous thrombosis)     Hypertension     Miscarriage        Past Surgical History:   Procedure Laterality Date     femoral vascular surgery       ANGIOGRAPHY OF LOWER EXTREMITY Right 2/23/2023    Procedure: Angiogram Extremity Unilateral;  Surgeon: Danny Dunbar MD;  Location: [facility];  Service: Vascular;  Laterality: Right;  RN PHONE PREOP 2/20/2023----CK CONSENT    CREATION OF ILIOFEMORAL ARTERY BYPASS Right 3/20/2019    Procedure: CREATION, BYPASS, ARTERIAL, ILIAC TO FEMORAL, RIGHT LOWER EXTREMITY, RIGHT PROFUNDAPLASTY;  Surgeon: Danny Dunbar MD;  Location: [facility] OR;  Service: Vascular;  Laterality: Right;  11:00AM START PER ANNE  RN PREOP 3/13/2019  ACT'S, CELL SAVER, GRAFTS, BOOK WATER---NEED H/P  CONSENT IN AM------HGBA1C    HYSTERECTOMY      ?unsure cervix present    INCISION AND DRAINAGE Right 4/12/2019    Procedure: Incision and Drainage - R groin washout, possible muscle flap;  Surgeon: Danny Dunbar MD;  Location: [facility] OR [address] [place] [address];  Service: Vascular;  Laterality: Right;  groin washout    INCISION AND DRAINAGE OF GROIN Right 5/3/2019    Procedure: Incision and Drainage R groin;  Surgeon: Danny Dunbar MD;  Location: [facility] OR [address] [place] [address];  Service: Vascular;  Laterality: Right;       Review of patient's allergies indicates:   Allergen Reactions    Motrin [ibuprofen] Itching    Zosyn [piperacillin-tazobactam] Itching       No current facility-administered medications on file prior to encounter.     Current Outpatient Medications on File Prior to Encounter   Medication Sig    amlodipine (NORVASC) 10 MG tablet Take 10 mg by mouth once daily.    aspirin (ECOTRIN) 81 MG EC tablet Take 81 mg by mouth once daily.    BLOOD SUGAR DIAGNOSTIC (TRUE METRIX GLUCOSE TEST STRIP MISC) by Misc.(Non-Drug; Combo Route) route.    carvediloL (COREG) 12.5 MG tablet Take 12.5 mg by mouth 2 (two)  "times daily.    cilostazol (PLETAL) 50 MG Tab Take 1 tablet (50 mg total) by mouth 2 (two) times daily. If patient tolerate medication after 4 weeks, increase dose to 100 mg twice daily.    glipiZIDE (GLUCOTROL) 10 MG tablet Take 10 mg by mouth 2 (two) times daily before meals.    insulin aspart protamine-insulin aspart (NOVOLOG 70/30) 100 unit/mL (70-30) InPn pen Inject 50 Units into the skin every morning.    insulin aspart protamine-insulin aspart (NOVOLOG 70/30) 100 unit/mL (70-30) InPn pen Inject 50 Units into the skin every evening.    insulin syringe-needle,dispos. 0.3 mL 30 gauge x 5/16" Syrg by Misc.(Non-Drug; Combo Route) route.    losartan-hydrochlorothiazide 100-25 mg (HYZAAR) 100-25 mg per tablet Take 1 tablet by mouth once daily.    metformin (GLUCOPHAGE) 1000 MG tablet Take 1,000 mg by mouth 2 (two) times daily with meals.    ondansetron (ZOFRAN) 8 MG tablet      Family History       Problem Relation (Age of Onset)    Diabetes Father    Hypertension Father          Tobacco Use    Smoking status: Former     Types: Cigarettes     Quit date:      Years since quittin.1    Smokeless tobacco: Never   Substance and Sexual Activity    Alcohol use: No    Drug use: No    Sexual activity: Never     Review of Systems   Gastrointestinal:  Negative for melena.   Genitourinary:  Negative for hematuria.   Objective:     Vital Signs (Most Recent):  Temp: 98.2 °F (36.8 °C) (23 0730)  Pulse: 84 (23 0730)  Resp: 20 (23 1316)  BP: (!) 145/63 (23 0730)  SpO2: 96 % (23 0730)   Vital Signs (24h Range):  Temp:  [98 °F (36.7 °C)-98.2 °F (36.8 °C)] 98.2 °F (36.8 °C)  Pulse:  [84-98] 84  Resp:  [16-20] 20  SpO2:  [93 %-96 %] 96 %  BP: (114-145)/(57-65) 145/63     Weight: 73.9 kg (163 lb)  Body mass index is 27.98 kg/m².    SpO2: 96 %         Intake/Output Summary (Last 24 hours) at 3/1/2023 1353  Last data filed at 2023 1730  Gross per 24 hour   Intake 240 ml   Output --   Net 240 ml "         Lines/Drains/Airways       Peripheral Intravenous Line  Duration                  Peripheral IV - Single Lumen 02/28/23 1500 22 G Right Upper Arm <1 day                  Exam unchanged vs 2/28/23  Physical Exam  Constitutional:       General: She is not in acute distress.     Appearance: Normal appearance. She is well-developed. She is not ill-appearing, toxic-appearing or diaphoretic.   HENT:      Head: Normocephalic and atraumatic.   Eyes:      General: No scleral icterus.     Extraocular Movements: Extraocular movements intact.      Conjunctiva/sclera: Conjunctivae normal.      Pupils: Pupils are equal, round, and reactive to light.   Neck:      Vascular: No JVD.      Trachea: No tracheal deviation.   Cardiovascular:      Rate and Rhythm: Normal rate and regular rhythm.      Heart sounds: S1 normal and S2 normal. No murmur heard.    No friction rub. No gallop.   Pulmonary:      Effort: Pulmonary effort is normal. No respiratory distress.      Breath sounds: Normal breath sounds. No stridor. No wheezing, rhonchi or rales.   Chest:      Chest wall: No tenderness.   Abdominal:      General: There is no distension.      Palpations: Abdomen is soft.   Musculoskeletal:         General: No swelling or tenderness. Normal range of motion.      Cervical back: Normal range of motion and neck supple. No rigidity.      Right lower leg: No edema.      Left lower leg: No edema.      Comments: R foot dry gangrene of toes   Skin:     General: Skin is warm and dry.      Coloration: Skin is not jaundiced.   Neurological:      General: No focal deficit present.      Mental Status: She is alert and oriented to person, place, and time.      Cranial Nerves: No cranial nerve deficit.   Psychiatric:         Mood and Affect: Mood normal.         Behavior: Behavior normal.       Current Medications:   amLODIPine  10 mg Oral Daily    aspirin  81 mg Oral Daily    atorvastatin  40 mg Oral QHS    carvediloL  12.5 mg Oral BID     cilostazoL  50 mg Oral BID    hydroCHLOROthiazide  25 mg Oral Daily    insulin aspart protamine-insulin aspart  50 Units Subcutaneous QAM    insulin aspart protamine-insulin aspart  25 Units Subcutaneous QHS    losartan  100 mg Oral Daily    piperacillin-tazobactam (ZOSYN) IVPB  4.5 g Intravenous Q8H    polyethylene glycol  17 g Oral Daily    vancomycin (VANCOCIN) IVPB  1,000 mg Intravenous Q24H      heparin (porcine) in D5W 12 Units/kg/hr (03/01/23 1308)     acetaminophen, acetaminophen, albuterol-ipratropium, aluminum-magnesium hydroxide-simethicone, bisacodyL, dextrose 10%, dextrose 10%, diphenhydrAMINE, glucagon (human recombinant), glucose, glucose, HYDROcodone-acetaminophen, insulin aspart U-100, magnesium oxide, magnesium oxide, melatonin, morphine, naloxone, ondansetron, potassium bicarbonate, potassium bicarbonate, potassium bicarbonate, potassium, sodium phosphates, potassium, sodium phosphates, potassium, sodium phosphates, prochlorperazine, simethicone, sodium chloride 0.9%, Pharmacy to dose Vancomycin consult **AND** vancomycin - pharmacy to dose    Laboratory (all labs reviewed):  CBC:  Recent Labs   Lab 02/15/23  1002 02/27/23  1427 02/28/23  0549 03/01/23  0938   WBC 10.22 10.57 10.99 8.45   Hemoglobin 10.7 L 10.2 L 10.3 L 9.3 L   Hematocrit 35.0 L 33.4 L 33.5 L 30.3 L   Platelets 395 397 406 381         CHEMISTRIES:  Recent Labs   Lab 01/30/23  0708 02/15/23  1002 02/27/23  1427 02/28/23  0549 03/01/23  0606   Glucose  --  157 H 138 H 49  H   Sodium  --  142 139 139 136   Potassium  --  4.5 4.6 4.3 4.3   BUN  --  26 H 18 19 26 H   Creatinine 1.1 1.0 1.2 1.3 1.6 H   eGFR 50 A 56 A 45 A 41 A 32 A   Calcium  --  9.3 9.3 9.4 9.3   Magnesium  --  2.3  --  2.2 2.3         CARDIAC BIOMARKERS:        COAGS:  Recent Labs   Lab 02/15/23  1002 02/27/23  1427 02/28/23  0549 03/01/23  0938   INR 1.1 1.1 1.1 1.1         LIPIDS/LFTS:  Recent Labs   Lab 02/27/23  1427 02/28/23  0549   Cholesterol  --  109 L    Triglycerides  --  58   HDL  --  38 L   LDL Cholesterol  --  59.4 L   Non-HDL Cholesterol  --  71   AST 22 22   ALT 9 L 11         BNP:        TSH:        Free T4:        Diagnostic Results:  ECG (personally reviewed and interpreted tracing(s)):  2/27/23 1447 , IMI-age indet, NSSTTW changes    Echo 2/28/23 (images prev personally reviewed and interpreted)  The left ventricle is normal in size with mild concentric hypertrophy and normal systolic function.  The estimated ejection fraction is 65%.  Grade I left ventricular diastolic dysfunction.  Normal right ventricular size with normal right ventricular systolic function.  The estimated PA systolic pressure is 37 mmHg.      Physical Exam  Constitutional:       General: She is not in acute distress.     Appearance: Normal appearance. She is well-developed. She is not ill-appearing, toxic-appearing or diaphoretic.   HENT:      Head: Normocephalic and atraumatic.   Eyes:      General: No scleral icterus.     Extraocular Movements: Extraocular movements intact.      Conjunctiva/sclera: Conjunctivae normal.      Pupils: Pupils are equal, round, and reactive to light.   Neck:      Vascular: No JVD.      Trachea: No tracheal deviation.   Cardiovascular:      Rate and Rhythm: Normal rate and regular rhythm.      Heart sounds: S1 normal and S2 normal. No murmur heard.    No friction rub. No gallop.   Pulmonary:      Effort: Pulmonary effort is normal. No respiratory distress.      Breath sounds: Normal breath sounds. No stridor. No wheezing, rhonchi or rales.   Chest:      Chest wall: No tenderness.   Abdominal:      General: There is no distension.      Palpations: Abdomen is soft.   Musculoskeletal:         General: No swelling or tenderness. Normal range of motion.      Cervical back: Normal range of motion and neck supple. No rigidity.      Right lower leg: No edema.      Left lower leg: No edema.      Comments: R foot dry gangrene of toes   Skin:     General:  Skin is warm and dry.      Coloration: Skin is not jaundiced.   Neurological:      General: No focal deficit present.      Mental Status: She is alert and oriented to person, place, and time.      Cranial Nerves: No cranial nerve deficit.   Psychiatric:         Mood and Affect: Mood normal.         Behavior: Behavior normal.

## 2023-03-01 NOTE — HOSPITAL COURSE
Interval Hx: R foot pain.  No cp/sob.  Case d/w RN and family at bedside.  Echo yesterday normal.

## 2023-03-01 NOTE — ASSESSMENT & PLAN NOTE
Severe RLE PAD (EIA occlusion, chronic) with occluded L->R fem-fem bypass by angio 2/23/23.  Now with rest pain and wet gangrene.  Mgmt per Vasc surgery, plans for surgical revasc.  Cont ASA/cilostazol  Cont atorva 40mg qhs  RCRI=3, 10.1% 30d periop MACE risk-> will check Debby MPI today for further risk stratification.

## 2023-03-01 NOTE — PROGRESS NOTES
[facility]  Cardiology  Progress Note    Patient Name: Nelsy Marino  MRN: [id number]  Admission Date: 2/27/2023  Hospital Length of Stay: 2 days  Code Status: Full Code   Attending Physician: Jackeline Peralta DO   Primary Care Physician: Juan F Garay  Expected Discharge Date:   Principal Problem:Wet gangrene    Subjective:     Interval Hx: R foot pain.  No cp/sob.  Case d/w RN and family at bedside.  Echo yesterday normal.        Past Medical History:   Diagnosis Date    CHF (congestive heart failure)     Diabetes mellitus     DVT (deep venous thrombosis)     Hypertension     Miscarriage        Past Surgical History:   Procedure Laterality Date     femoral vascular surgery       ANGIOGRAPHY OF LOWER EXTREMITY Right 2/23/2023    Procedure: Angiogram Extremity Unilateral;  Surgeon: Danny Dunbar MD;  Location: [facility];  Service: Vascular;  Laterality: Right;  RN PHONE PREOP 2/20/2023----CK CONSENT    CREATION OF ILIOFEMORAL ARTERY BYPASS Right 3/20/2019    Procedure: CREATION, BYPASS, ARTERIAL, ILIAC TO FEMORAL, RIGHT LOWER EXTREMITY, RIGHT PROFUNDAPLASTY;  Surgeon: Danny Dunbar MD;  Location: [facility];  Service: Vascular;  Laterality: Right;  11:00AM START PER ANNE  RN PREOP 3/13/2019  ACT'S, CELL SAVER, GRAFTS, BOOK WATER---NEED H/P  CONSENT IN AM------HGBA1C    HYSTERECTOMY      ?unsure cervix present    INCISION AND DRAINAGE Right 4/12/2019    Procedure: Incision and Drainage - R groin washout, possible muscle flap;  Surgeon: Danny Dunbar MD;  Location: [facility] OR [address] [place] [address];  Service: Vascular;  Laterality: Right;  groin washout    INCISION AND DRAINAGE OF GROIN Right 5/3/2019    Procedure: Incision and Drainage R groin;  Surgeon: Danny Dunbar MD;  [facility];  Service: Vascular;  Laterality: Right;       Review of patient's allergies indicates:   Allergen Reactions    Motrin [ibuprofen] Itching    Zosyn [piperacillin-tazobactam] Itching       No current  "facility-administered medications on file prior to encounter.     Current Outpatient Medications on File Prior to Encounter   Medication Sig    amlodipine (NORVASC) 10 MG tablet Take 10 mg by mouth once daily.    aspirin (ECOTRIN) 81 MG EC tablet Take 81 mg by mouth once daily.    BLOOD SUGAR DIAGNOSTIC (TRUE METRIX GLUCOSE TEST STRIP MISC) by Misc.(Non-Drug; Combo Route) route.    carvediloL (COREG) 12.5 MG tablet Take 12.5 mg by mouth 2 (two) times daily.    cilostazol (PLETAL) 50 MG Tab Take 1 tablet (50 mg total) by mouth 2 (two) times daily. If patient tolerate medication after 4 weeks, increase dose to 100 mg twice daily.    glipiZIDE (GLUCOTROL) 10 MG tablet Take 10 mg by mouth 2 (two) times daily before meals.    insulin aspart protamine-insulin aspart (NOVOLOG 70/30) 100 unit/mL (70-30) InPn pen Inject 50 Units into the skin every morning.    insulin aspart protamine-insulin aspart (NOVOLOG 70/30) 100 unit/mL (70-30) InPn pen Inject 50 Units into the skin every evening.    insulin syringe-needle,dispos. 0.3 mL 30 gauge x 5/16" Syrg by Misc.(Non-Drug; Combo Route) route.    losartan-hydrochlorothiazide 100-25 mg (HYZAAR) 100-25 mg per tablet Take 1 tablet by mouth once daily.    metformin (GLUCOPHAGE) 1000 MG tablet Take 1,000 mg by mouth 2 (two) times daily with meals.    ondansetron (ZOFRAN) 8 MG tablet      Family History       Problem Relation (Age of Onset)    Diabetes Father    Hypertension Father          Tobacco Use    Smoking status: Former     Types: Cigarettes     Quit date: 1980     Years since quittin.1    Smokeless tobacco: Never   Substance and Sexual Activity    Alcohol use: No    Drug use: No    Sexual activity: Never     Review of Systems   Gastrointestinal:  Negative for melena.   Genitourinary:  Negative for hematuria.   Objective:     Vital Signs (Most Recent):  Temp: 98.2 °F (36.8 °C) (23)  Pulse: 84 (23)  Resp: 18 (23)  BP: (!) 145/63 (23 " 0730)  SpO2: 96 % (03/01/23 0730)   Vital Signs (24h Range):  Temp:  [98 °F (36.7 °C)-98.2 °F (36.8 °C)] 98.2 °F (36.8 °C)  Pulse:  [83-98] 84  Resp:  [16-20] 18  SpO2:  [93 %-96 %] 96 %  BP: (114-145)/(57-65) 145/63     Weight: 73.9 kg (163 lb)  Body mass index is 27.98 kg/m².    SpO2: 96 %         Intake/Output Summary (Last 24 hours) at 3/1/2023 0752  Last data filed at 2/28/2023 1730  Gross per 24 hour   Intake 240 ml   Output --   Net 240 ml         Lines/Drains/Airways       Peripheral Intravenous Line  Duration                  Peripheral IV - Single Lumen 02/28/23 1500 22 G Right Upper Arm <1 day                  Exam unchanged vs 2/28/23  Physical Exam  Constitutional:       General: She is not in acute distress.     Appearance: Normal appearance. She is well-developed. She is not ill-appearing, toxic-appearing or diaphoretic.   HENT:      Head: Normocephalic and atraumatic.   Eyes:      General: No scleral icterus.     Extraocular Movements: Extraocular movements intact.      Conjunctiva/sclera: Conjunctivae normal.      Pupils: Pupils are equal, round, and reactive to light.   Neck:      Vascular: No JVD.      Trachea: No tracheal deviation.   Cardiovascular:      Rate and Rhythm: Normal rate and regular rhythm.      Heart sounds: S1 normal and S2 normal. No murmur heard.    No friction rub. No gallop.   Pulmonary:      Effort: Pulmonary effort is normal. No respiratory distress.      Breath sounds: Normal breath sounds. No stridor. No wheezing, rhonchi or rales.   Chest:      Chest wall: No tenderness.   Abdominal:      General: There is no distension.      Palpations: Abdomen is soft.   Musculoskeletal:         General: No swelling or tenderness. Normal range of motion.      Cervical back: Normal range of motion and neck supple. No rigidity.      Right lower leg: No edema.      Left lower leg: No edema.      Comments: R foot wet gangrene of great and 2nd toes   Skin:     General: Skin is warm and dry.       Coloration: Skin is not jaundiced.   Neurological:      General: No focal deficit present.      Mental Status: She is alert and oriented to person, place, and time.      Cranial Nerves: No cranial nerve deficit.   Psychiatric:         Mood and Affect: Mood normal.         Behavior: Behavior normal.       Current Medications:   amLODIPine  10 mg Oral Daily    aspirin  81 mg Oral Daily    atorvastatin  40 mg Oral QHS    carvediloL  12.5 mg Oral BID    cilostazoL  50 mg Oral BID    heparin (porcine)  5,000 Units Subcutaneous Q8H    hydroCHLOROthiazide  25 mg Oral Daily    insulin aspart protamine-insulin aspart  50 Units Subcutaneous QAM    insulin aspart protamine-insulin aspart  25 Units Subcutaneous QHS    losartan  100 mg Oral Daily    piperacillin-tazobactam (ZOSYN) IVPB  4.5 g Intravenous Q8H    polyethylene glycol  17 g Oral Daily    vancomycin (VANCOCIN) IVPB  1,000 mg Intravenous Q24H       acetaminophen, acetaminophen, albuterol-ipratropium, aluminum-magnesium hydroxide-simethicone, bisacodyL, dextrose 10%, dextrose 10%, diphenhydrAMINE, glucagon (human recombinant), glucose, glucose, HYDROcodone-acetaminophen, insulin aspart U-100, magnesium oxide, magnesium oxide, melatonin, morphine, naloxone, ondansetron, potassium bicarbonate, potassium bicarbonate, potassium bicarbonate, potassium, sodium phosphates, potassium, sodium phosphates, potassium, sodium phosphates, prochlorperazine, simethicone, sodium chloride 0.9%, Pharmacy to dose Vancomycin consult **AND** vancomycin - pharmacy to dose    Laboratory (all labs reviewed):  CBC:  Recent Labs   Lab 02/15/23  1002 02/27/23  1427 02/28/23  0549   WBC 10.22 10.57 10.99   Hemoglobin 10.7 L 10.2 L 10.3 L   Hematocrit 35.0 L 33.4 L 33.5 L   Platelets 395 397 406         CHEMISTRIES:  Recent Labs   Lab 01/30/23  0708 02/15/23  1002 02/27/23  1427 02/28/23  0549 03/01/23  0606   Glucose  --  157 H 138 H 49  H   Sodium  --  142 139 139 136   Potassium  --   4.5 4.6 4.3 4.3   BUN  --  26 H 18 19 26 H   Creatinine 1.1 1.0 1.2 1.3 1.6 H   eGFR 50 A 56 A 45 A 41 A 32 A   Calcium  --  9.3 9.3 9.4 9.3   Magnesium  --  2.3  --  2.2 2.3         CARDIAC BIOMARKERS:        COAGS:  Recent Labs   Lab 02/15/23  1002 02/27/23  1427 02/28/23  0549   INR 1.1 1.1 1.1         LIPIDS/LFTS:  Recent Labs   Lab 02/27/23  1427 02/28/23  0549   Cholesterol  --  109 L   Triglycerides  --  58   HDL  --  38 L   LDL Cholesterol  --  59.4 L   Non-HDL Cholesterol  --  71   AST 22 22   ALT 9 L 11         BNP:        TSH:        Free T4:        Diagnostic Results:  ECG (personally reviewed and interpreted tracing(s)):  2/27/23 1447 , IMI-age indet, NSSTTW changes    Echo 2/28/23 (images prev personally reviewed and interpreted)  The left ventricle is normal in size with mild concentric hypertrophy and normal systolic function.  The estimated ejection fraction is 65%.  Grade I left ventricular diastolic dysfunction.  Normal right ventricular size with normal right ventricular systolic function.  The estimated PA systolic pressure is 37 mmHg.        Assessment and Plan:     * Wet gangrene  Severe RLE PAD (EIA occlusion, chronic) with occluded L->R fem-fem bypass by angio 2/23/23.  Now with rest pain and wet gangrene.  Mgmt per [facility] surgery, plans for surgical revasc.  Cont ASA/cilostazol  Cont atorva 40mg qhs  RCRI=3, 10.1% 30d periop MACE risk-> will check Debby MPI today for further risk stratification.     PVD (peripheral vascular disease)  As above    Essential hypertension  Cont med rx    Type 2 diabetes mellitus  Per primary team    Preoperative clearance  Poor premorbid exercise capacity.  RCRI=3, 10.1% 30d periop MACE risk-> will check Debby MPI today for further risk stratification.           VTE Risk Mitigation (From admission, onward)           Ordered     heparin (porcine) injection 5,000 Units  Every 8 hours         02/27/23 2200     IP VTE HIGH RISK PATIENT  Once         02/27/23 2200      Place sequential compression device  Until discontinued         02/27/23 2200                    Demarco Wright MD  Cardiology  [facility]    Addendum 140pm:    Debby MPI 3/1/23    Normal myocardial perfusion scan. There is no evidence of myocardial ischemia or infarction.    The visually estimated ejection fraction is normal during stress.    There is normal wall motion post stress.    Pt at acceptable cardiac risk for vasc surgery/anesthesia.  No further inpat cardiac testing planned.  Cardiology will sign off, pls call with questions.

## 2023-03-01 NOTE — NURSING
Pt become hypotensive after regadenoson  given 83/46 hr 82  Pt reclined in chair,no other symptoms noted  Pt alert and awake denies symptoms,bp  gradually improved 101/36 hr 82

## 2023-03-01 NOTE — SUBJECTIVE & OBJECTIVE
Past Medical History:   Diagnosis Date    CHF (congestive heart failure)     Diabetes mellitus     DVT (deep venous thrombosis)     Hypertension     Miscarriage        Past Surgical History:   Procedure Laterality Date     femoral vascular surgery       ANGIOGRAPHY OF LOWER EXTREMITY Right 2/23/2023    Procedure: Angiogram Extremity Unilateral;  Surgeon: Danny Dunbar MD;  Location: [facility];  Service: Vascular;  Laterality: Right;  RN PHONE PREOP 2/20/2023----CK CONSENT    CREATION OF ILIOFEMORAL ARTERY BYPASS Right 3/20/2019    Procedure: CREATION, BYPASS, ARTERIAL, ILIAC TO FEMORAL, RIGHT LOWER EXTREMITY, RIGHT PROFUNDAPLASTY;  Surgeon: Danny Dunbar MD;  Location: [facility] OR;  Service: Vascular;  Laterality: Right;  11:00AM START PER ANNE  RN PREOP 3/13/2019  ACT'S, CELL SAVER, GRAFTS, BOOK WATER---NEED H/P  CONSENT IN AM------HGBA1C    HYSTERECTOMY      ?unsure cervix present    INCISION AND DRAINAGE Right 4/12/2019    Procedure: Incision and Drainage - R groin washout, possible muscle flap;  Surgeon: Danny Dunbar MD;  Location: [facility] OR [address] [place] [address];  Service: Vascular;  Laterality: Right;  groin washout    INCISION AND DRAINAGE OF GROIN Right 5/3/2019    Procedure: Incision and Drainage R groin;  Surgeon: Danny Dunbar MD;  Location: [facility] OR [address] [place] [address];  Service: Vascular;  Laterality: Right;       Review of patient's allergies indicates:   Allergen Reactions    Motrin [ibuprofen] Itching    Zosyn [piperacillin-tazobactam] Itching       No current facility-administered medications on file prior to encounter.     Current Outpatient Medications on File Prior to Encounter   Medication Sig    amlodipine (NORVASC) 10 MG tablet Take 10 mg by mouth once daily.    aspirin (ECOTRIN) 81 MG EC tablet Take 81 mg by mouth once daily.    BLOOD SUGAR DIAGNOSTIC (TRUE METRIX GLUCOSE TEST STRIP MISC) by Misc.(Non-Drug; Combo Route) route.    carvediloL (COREG) 12.5 MG tablet Take 12.5 mg by mouth 2 (two)  "times daily.    cilostazol (PLETAL) 50 MG Tab Take 1 tablet (50 mg total) by mouth 2 (two) times daily. If patient tolerate medication after 4 weeks, increase dose to 100 mg twice daily.    glipiZIDE (GLUCOTROL) 10 MG tablet Take 10 mg by mouth 2 (two) times daily before meals.    insulin aspart protamine-insulin aspart (NOVOLOG 70/30) 100 unit/mL (70-30) InPn pen Inject 50 Units into the skin every morning.    insulin aspart protamine-insulin aspart (NOVOLOG 70/30) 100 unit/mL (70-30) InPn pen Inject 50 Units into the skin every evening.    insulin syringe-needle,dispos. 0.3 mL 30 gauge x 5/16" Syrg by Misc.(Non-Drug; Combo Route) route.    losartan-hydrochlorothiazide 100-25 mg (HYZAAR) 100-25 mg per tablet Take 1 tablet by mouth once daily.    metformin (GLUCOPHAGE) 1000 MG tablet Take 1,000 mg by mouth 2 (two) times daily with meals.    ondansetron (ZOFRAN) 8 MG tablet      Family History       Problem Relation (Age of Onset)    Diabetes Father    Hypertension Father          Tobacco Use    Smoking status: Former     Types: Cigarettes     Quit date:      Years since quittin.1    Smokeless tobacco: Never   Substance and Sexual Activity    Alcohol use: No    Drug use: No    Sexual activity: Never     Review of Systems   Gastrointestinal:  Negative for melena.   Genitourinary:  Negative for hematuria.   Objective:     Vital Signs (Most Recent):  Temp: 98.2 °F (36.8 °C) (23)  Pulse: 84 (23)  Resp: 18 (23)  BP: (!) 145/63 (23)  SpO2: 96 % (23)   Vital Signs (24h Range):  Temp:  [98 °F (36.7 °C)-98.2 °F (36.8 °C)] 98.2 °F (36.8 °C)  Pulse:  [83-98] 84  Resp:  [16-20] 18  SpO2:  [93 %-96 %] 96 %  BP: (114-145)/(57-65) 145/63     Weight: 73.9 kg (163 lb)  Body mass index is 27.98 kg/m².    SpO2: 96 %         Intake/Output Summary (Last 24 hours) at 3/1/2023 0752  Last data filed at 2023 1730  Gross per 24 hour   Intake 240 ml   Output --   Net 240 ml "         Lines/Drains/Airways       Peripheral Intravenous Line  Duration                  Peripheral IV - Single Lumen 02/28/23 1500 22 G Right Upper Arm <1 day                  Exam unchanged vs 2/28/23  Physical Exam  Constitutional:       General: She is not in acute distress.     Appearance: Normal appearance. She is well-developed. She is not ill-appearing, toxic-appearing or diaphoretic.   HENT:      Head: Normocephalic and atraumatic.   Eyes:      General: No scleral icterus.     Extraocular Movements: Extraocular movements intact.      Conjunctiva/sclera: Conjunctivae normal.      Pupils: Pupils are equal, round, and reactive to light.   Neck:      Vascular: No JVD.      Trachea: No tracheal deviation.   Cardiovascular:      Rate and Rhythm: Normal rate and regular rhythm.      Heart sounds: S1 normal and S2 normal. No murmur heard.    No friction rub. No gallop.   Pulmonary:      Effort: Pulmonary effort is normal. No respiratory distress.      Breath sounds: Normal breath sounds. No stridor. No wheezing, rhonchi or rales.   Chest:      Chest wall: No tenderness.   Abdominal:      General: There is no distension.      Palpations: Abdomen is soft.   Musculoskeletal:         General: No swelling or tenderness. Normal range of motion.      Cervical back: Normal range of motion and neck supple. No rigidity.      Right lower leg: No edema.      Left lower leg: No edema.      Comments: R foot wet gangrene of great and 2nd toes   Skin:     General: Skin is warm and dry.      Coloration: Skin is not jaundiced.   Neurological:      General: No focal deficit present.      Mental Status: She is alert and oriented to person, place, and time.      Cranial Nerves: No cranial nerve deficit.   Psychiatric:         Mood and Affect: Mood normal.         Behavior: Behavior normal.       Current Medications:   amLODIPine  10 mg Oral Daily    aspirin  81 mg Oral Daily    atorvastatin  40 mg Oral QHS    carvediloL  12.5 mg Oral  BID    cilostazoL  50 mg Oral BID    heparin (porcine)  5,000 Units Subcutaneous Q8H    hydroCHLOROthiazide  25 mg Oral Daily    insulin aspart protamine-insulin aspart  50 Units Subcutaneous QAM    insulin aspart protamine-insulin aspart  25 Units Subcutaneous QHS    losartan  100 mg Oral Daily    piperacillin-tazobactam (ZOSYN) IVPB  4.5 g Intravenous Q8H    polyethylene glycol  17 g Oral Daily    vancomycin (VANCOCIN) IVPB  1,000 mg Intravenous Q24H       acetaminophen, acetaminophen, albuterol-ipratropium, aluminum-magnesium hydroxide-simethicone, bisacodyL, dextrose 10%, dextrose 10%, diphenhydrAMINE, glucagon (human recombinant), glucose, glucose, HYDROcodone-acetaminophen, insulin aspart U-100, magnesium oxide, magnesium oxide, melatonin, morphine, naloxone, ondansetron, potassium bicarbonate, potassium bicarbonate, potassium bicarbonate, potassium, sodium phosphates, potassium, sodium phosphates, potassium, sodium phosphates, prochlorperazine, simethicone, sodium chloride 0.9%, Pharmacy to dose Vancomycin consult **AND** vancomycin - pharmacy to dose    Laboratory (all labs reviewed):  CBC:  Recent Labs   Lab 02/15/23  1002 02/27/23  1427 02/28/23  0549   WBC 10.22 10.57 10.99   Hemoglobin 10.7 L 10.2 L 10.3 L   Hematocrit 35.0 L 33.4 L 33.5 L   Platelets 395 397 406         CHEMISTRIES:  Recent Labs   Lab 01/30/23  0708 02/15/23  1002 02/27/23  1427 02/28/23  0549 03/01/23  0606   Glucose  --  157 H 138 H 49  H   Sodium  --  142 139 139 136   Potassium  --  4.5 4.6 4.3 4.3   BUN  --  26 H 18 19 26 H   Creatinine 1.1 1.0 1.2 1.3 1.6 H   eGFR 50 A 56 A 45 A 41 A 32 A   Calcium  --  9.3 9.3 9.4 9.3   Magnesium  --  2.3  --  2.2 2.3         CARDIAC BIOMARKERS:        COAGS:  Recent Labs   Lab 02/15/23  1002 02/27/23  1427 02/28/23  0549   INR 1.1 1.1 1.1         LIPIDS/LFTS:  Recent Labs   Lab 02/27/23  1427 02/28/23  0549   Cholesterol  --  109 L   Triglycerides  --  58   HDL  --  38 L   LDL Cholesterol   --  59.4 L   Non-HDL Cholesterol  --  71   AST 22 22   ALT 9 L 11         BNP:        TSH:        Free T4:        Diagnostic Results:  ECG (personally reviewed and interpreted tracing(s)):  2/27/23 1447 , IMI-age indet, NSSTTW changes    Echo 2/28/23 (images prev personally reviewed and interpreted)  The left ventricle is normal in size with mild concentric hypertrophy and normal systolic function.  The estimated ejection fraction is 65%.  Grade I left ventricular diastolic dysfunction.  Normal right ventricular size with normal right ventricular systolic function.  The estimated PA systolic pressure is 37 mmHg.

## 2023-03-01 NOTE — ASSESSMENT & PLAN NOTE
-Blood glucose was 49 on 02/28. Pt asymptomatic. Hx of DM type 2  -likely due to being NPO status for evaluation  -Blood glucose improved with glucagon  -Reduced basal insulin dose given NPO status at midnight   -Continue to monitor

## 2023-03-02 ENCOUNTER — ANESTHESIA (OUTPATIENT)
Dept: SURGERY | Facility: HOSPITAL | Age: 83
DRG: 853 | End: 2023-03-02
Payer: MEDICARE

## 2023-03-02 PROBLEM — I96 WET GANGRENE: Status: RESOLVED | Noted: 2023-03-02 | Resolved: 2023-03-02

## 2023-03-02 PROBLEM — I96 WET GANGRENE: Status: ACTIVE | Noted: 2023-03-02

## 2023-03-02 PROBLEM — Z01.818 PREOPERATIVE CLEARANCE: Status: RESOLVED | Noted: 2023-02-28 | Resolved: 2023-03-02

## 2023-03-02 PROBLEM — I73.9 PVD (PERIPHERAL VASCULAR DISEASE): Chronic | Status: RESOLVED | Noted: 2019-03-20 | Resolved: 2023-03-02

## 2023-03-02 PROBLEM — Z71.89 ADVANCED CARE PLANNING/COUNSELING DISCUSSION: Status: RESOLVED | Noted: 2023-02-28 | Resolved: 2023-03-02

## 2023-03-02 LAB
ABO + RH BLD: ABNORMAL
ABO + RH BLD: ABNORMAL
ALBUMIN SERPL BCP-MCNC: 2.4 G/DL (ref 3.5–5.2)
ALP SERPL-CCNC: 52 U/L (ref 55–135)
ALT SERPL W/O P-5'-P-CCNC: 12 U/L (ref 10–44)
ANION GAP SERPL CALC-SCNC: 7 MMOL/L (ref 8–16)
ANION GAP SERPL CALC-SCNC: 7 MMOL/L (ref 8–16)
APTT BLDCRRT: 27.5 SEC (ref 21–32)
APTT BLDCRRT: 29.8 SEC (ref 21–32)
AST SERPL-CCNC: 24 U/L (ref 10–40)
BASOPHILS # BLD AUTO: 0.01 K/UL (ref 0–0.2)
BASOPHILS # BLD AUTO: 0.02 K/UL (ref 0–0.2)
BASOPHILS # BLD AUTO: 0.03 K/UL (ref 0–0.2)
BASOPHILS NFR BLD: 0.1 % (ref 0–1.9)
BILIRUB SERPL-MCNC: 1.4 MG/DL (ref 0.1–1)
BLD GP AB SCN CELLS X3 SERPL QL: ABNORMAL
BLD GP AB SCN CELLS X3 SERPL QL: ABNORMAL
BLD PROD TYP BPU: NORMAL
BLOOD GROUP ANTIBODIES SERPL: NORMAL
BLOOD UNIT EXPIRATION DATE: NORMAL
BLOOD UNIT TYPE CODE: 1700
BLOOD UNIT TYPE: NORMAL
BUN SERPL-MCNC: 18 MG/DL (ref 8–23)
BUN SERPL-MCNC: 22 MG/DL (ref 8–23)
CALCIUM SERPL-MCNC: 8.5 MG/DL (ref 8.7–10.5)
CALCIUM SERPL-MCNC: 9.2 MG/DL (ref 8.7–10.5)
CHLORIDE SERPL-SCNC: 100 MMOL/L (ref 95–110)
CHLORIDE SERPL-SCNC: 104 MMOL/L (ref 95–110)
CO2 SERPL-SCNC: 23 MMOL/L (ref 23–29)
CO2 SERPL-SCNC: 25 MMOL/L (ref 23–29)
CODING SYSTEM: NORMAL
CREAT SERPL-MCNC: 1 MG/DL (ref 0.5–1.4)
CREAT SERPL-MCNC: 1.2 MG/DL (ref 0.5–1.4)
CROSSMATCH INTERPRETATION: NORMAL
DIFFERENTIAL METHOD: ABNORMAL
DISPENSE STATUS: NORMAL
EOSINOPHIL # BLD AUTO: 0.2 K/UL (ref 0–0.5)
EOSINOPHIL NFR BLD: 0.7 % (ref 0–8)
EOSINOPHIL NFR BLD: 0.8 % (ref 0–8)
EOSINOPHIL NFR BLD: 2.7 % (ref 0–8)
ERYTHROCYTE [DISTWIDTH] IN BLOOD BY AUTOMATED COUNT: 14.1 % (ref 11.5–14.5)
ERYTHROCYTE [DISTWIDTH] IN BLOOD BY AUTOMATED COUNT: 14.3 % (ref 11.5–14.5)
ERYTHROCYTE [DISTWIDTH] IN BLOOD BY AUTOMATED COUNT: 14.4 % (ref 11.5–14.5)
EST. GFR  (NO RACE VARIABLE): 45.2 ML/MIN/1.73 M^2
EST. GFR  (NO RACE VARIABLE): 56.2 ML/MIN/1.73 M^2
GLUCOSE SERPL-MCNC: 241 MG/DL (ref 70–110)
GLUCOSE SERPL-MCNC: 248 MG/DL (ref 70–110)
HCT VFR BLD AUTO: 29.7 % (ref 37–48.5)
HCT VFR BLD AUTO: 29.8 % (ref 37–48.5)
HCT VFR BLD AUTO: 31.5 % (ref 37–48.5)
HGB BLD-MCNC: 9.5 G/DL (ref 12–16)
HGB BLD-MCNC: 9.5 G/DL (ref 12–16)
HGB BLD-MCNC: 9.6 G/DL (ref 12–16)
IMM GRANULOCYTES # BLD AUTO: 0.03 K/UL (ref 0–0.04)
IMM GRANULOCYTES # BLD AUTO: 0.28 K/UL (ref 0–0.04)
IMM GRANULOCYTES # BLD AUTO: 0.36 K/UL (ref 0–0.04)
IMM GRANULOCYTES NFR BLD AUTO: 0.4 % (ref 0–0.5)
IMM GRANULOCYTES NFR BLD AUTO: 1.3 % (ref 0–0.5)
IMM GRANULOCYTES NFR BLD AUTO: 1.7 % (ref 0–0.5)
INR PPP: 1.2 (ref 0.8–1.2)
LYMPHOCYTES # BLD AUTO: 0.8 K/UL (ref 1–4.8)
LYMPHOCYTES # BLD AUTO: 0.9 K/UL (ref 1–4.8)
LYMPHOCYTES # BLD AUTO: 1.1 K/UL (ref 1–4.8)
LYMPHOCYTES NFR BLD: 14.1 % (ref 18–48)
LYMPHOCYTES NFR BLD: 3.9 % (ref 18–48)
LYMPHOCYTES NFR BLD: 4.1 % (ref 18–48)
MAGNESIUM SERPL-MCNC: 2.1 MG/DL (ref 1.6–2.6)
MCH RBC QN AUTO: 24.6 PG (ref 27–31)
MCH RBC QN AUTO: 25.5 PG (ref 27–31)
MCH RBC QN AUTO: 25.9 PG (ref 27–31)
MCHC RBC AUTO-ENTMCNC: 30.2 G/DL (ref 32–36)
MCHC RBC AUTO-ENTMCNC: 31.9 G/DL (ref 32–36)
MCHC RBC AUTO-ENTMCNC: 32.3 G/DL (ref 32–36)
MCV RBC AUTO: 80 FL (ref 82–98)
MCV RBC AUTO: 80 FL (ref 82–98)
MCV RBC AUTO: 82 FL (ref 82–98)
MONOCYTES # BLD AUTO: 0.4 K/UL (ref 0.3–1)
MONOCYTES # BLD AUTO: 0.9 K/UL (ref 0.3–1)
MONOCYTES # BLD AUTO: 0.9 K/UL (ref 0.3–1)
MONOCYTES NFR BLD: 4.1 % (ref 4–15)
MONOCYTES NFR BLD: 4.2 % (ref 4–15)
MONOCYTES NFR BLD: 5.7 % (ref 4–15)
NEUTROPHILS # BLD AUTO: 18.6 K/UL (ref 1.8–7.7)
NEUTROPHILS # BLD AUTO: 18.8 K/UL (ref 1.8–7.7)
NEUTROPHILS # BLD AUTO: 6 K/UL (ref 1.8–7.7)
NEUTROPHILS NFR BLD: 77 % (ref 38–73)
NEUTROPHILS NFR BLD: 89.3 % (ref 38–73)
NEUTROPHILS NFR BLD: 89.7 % (ref 38–73)
NRBC BLD-RTO: 0 /100 WBC
PHOSPHATE SERPL-MCNC: 2.8 MG/DL (ref 2.7–4.5)
PLATELET # BLD AUTO: 301 K/UL (ref 150–450)
PLATELET # BLD AUTO: 303 K/UL (ref 150–450)
PLATELET # BLD AUTO: 363 K/UL (ref 150–450)
PMV BLD AUTO: 10.2 FL (ref 9.2–12.9)
PMV BLD AUTO: 9.9 FL (ref 9.2–12.9)
PMV BLD AUTO: 9.9 FL (ref 9.2–12.9)
POCT GLUCOSE: 190 MG/DL (ref 70–110)
POCT GLUCOSE: 233 MG/DL (ref 70–110)
POCT GLUCOSE: 255 MG/DL (ref 70–110)
POCT GLUCOSE: 306 MG/DL (ref 70–110)
POTASSIUM SERPL-SCNC: 4.2 MMOL/L (ref 3.5–5.1)
POTASSIUM SERPL-SCNC: 4.3 MMOL/L (ref 3.5–5.1)
PROT SERPL-MCNC: 6.4 G/DL (ref 6–8.4)
PROTHROMBIN TIME: 11.9 SEC (ref 9–12.5)
RBC # BLD AUTO: 3.7 M/UL (ref 4–5.4)
RBC # BLD AUTO: 3.72 M/UL (ref 4–5.4)
RBC # BLD AUTO: 3.86 M/UL (ref 4–5.4)
SODIUM SERPL-SCNC: 132 MMOL/L (ref 136–145)
SODIUM SERPL-SCNC: 134 MMOL/L (ref 136–145)
TRANS ERYTHROCYTES VOL PATIENT: NORMAL ML
WBC # BLD AUTO: 20.8 K/UL (ref 3.9–12.7)
WBC # BLD AUTO: 21.02 K/UL (ref 3.9–12.7)
WBC # BLD AUTO: 7.74 K/UL (ref 3.9–12.7)

## 2023-03-02 PROCEDURE — 63600175 PHARM REV CODE 636 W HCPCS: Performed by: STUDENT IN AN ORGANIZED HEALTH CARE EDUCATION/TRAINING PROGRAM

## 2023-03-02 PROCEDURE — C1894 INTRO/SHEATH, NON-LASER: HCPCS | Performed by: SURGERY

## 2023-03-02 PROCEDURE — C1876 STENT, NON-COA/NON-COV W/DEL: HCPCS | Performed by: SURGERY

## 2023-03-02 PROCEDURE — 36620 ARTERIAL: ICD-10-PCS | Mod: 59,,, | Performed by: ANESTHESIOLOGY

## 2023-03-02 PROCEDURE — 25000003 PHARM REV CODE 250: Performed by: STUDENT IN AN ORGANIZED HEALTH CARE EDUCATION/TRAINING PROGRAM

## 2023-03-02 PROCEDURE — 82962 GLUCOSE BLOOD TEST: CPT | Performed by: SURGERY

## 2023-03-02 PROCEDURE — 35665 PR BYPASS GRAFT OTHR,ILIOFEMORAL: ICD-10-PCS | Mod: RT,,, | Performed by: SURGERY

## 2023-03-02 PROCEDURE — 99499 NO LOS: ICD-10-PCS | Mod: ,,, | Performed by: SURGERY

## 2023-03-02 PROCEDURE — 80048 BASIC METABOLIC PNL TOTAL CA: CPT | Mod: XB | Performed by: STUDENT IN AN ORGANIZED HEALTH CARE EDUCATION/TRAINING PROGRAM

## 2023-03-02 PROCEDURE — 25000003 PHARM REV CODE 250: Performed by: INTERNAL MEDICINE

## 2023-03-02 PROCEDURE — L8670 VASCULAR GRAFT, SYNTHETIC: HCPCS | Performed by: SURGERY

## 2023-03-02 PROCEDURE — 36415 COLL VENOUS BLD VENIPUNCTURE: CPT | Performed by: STUDENT IN AN ORGANIZED HEALTH CARE EDUCATION/TRAINING PROGRAM

## 2023-03-02 PROCEDURE — 99233 PR SUBSEQUENT HOSPITAL CARE,LEVL III: ICD-10-PCS | Mod: ,,, | Performed by: PODIATRIST

## 2023-03-02 PROCEDURE — C1725 CATH, TRANSLUMIN NON-LASER: HCPCS | Performed by: SURGERY

## 2023-03-02 PROCEDURE — 99291 PR CRITICAL CARE, E/M 30-74 MINUTES: ICD-10-PCS | Mod: ,,, | Performed by: ANESTHESIOLOGY

## 2023-03-02 PROCEDURE — 35665 BPG ILIOFEMORAL: CPT | Mod: RT,,, | Performed by: SURGERY

## 2023-03-02 PROCEDURE — C1769 GUIDE WIRE: HCPCS | Performed by: SURGERY

## 2023-03-02 PROCEDURE — 86900 BLOOD TYPING SEROLOGIC ABO: CPT | Performed by: STUDENT IN AN ORGANIZED HEALTH CARE EDUCATION/TRAINING PROGRAM

## 2023-03-02 PROCEDURE — 37000009 HC ANESTHESIA EA ADD 15 MINS: Performed by: SURGERY

## 2023-03-02 PROCEDURE — 63600175 PHARM REV CODE 636 W HCPCS: Performed by: EMERGENCY MEDICINE

## 2023-03-02 PROCEDURE — 12000002 HC ACUTE/MED SURGE SEMI-PRIVATE ROOM

## 2023-03-02 PROCEDURE — 36000708 HC OR TIME LEV III 1ST 15 MIN: Performed by: SURGERY

## 2023-03-02 PROCEDURE — 36000709 HC OR TIME LEV III EA ADD 15 MIN: Performed by: SURGERY

## 2023-03-02 PROCEDURE — 25000003 PHARM REV CODE 250: Performed by: EMERGENCY MEDICINE

## 2023-03-02 PROCEDURE — 86870 RBC ANTIBODY IDENTIFICATION: CPT | Performed by: STUDENT IN AN ORGANIZED HEALTH CARE EDUCATION/TRAINING PROGRAM

## 2023-03-02 PROCEDURE — 86922 COMPATIBILITY TEST ANTIGLOB: CPT | Performed by: ANESTHESIOLOGY

## 2023-03-02 PROCEDURE — 99233 SBSQ HOSP IP/OBS HIGH 50: CPT | Mod: ,,, | Performed by: PODIATRIST

## 2023-03-02 PROCEDURE — 84100 ASSAY OF PHOSPHORUS: CPT | Performed by: STUDENT IN AN ORGANIZED HEALTH CARE EDUCATION/TRAINING PROGRAM

## 2023-03-02 PROCEDURE — D9220A PRA ANESTHESIA: ICD-10-PCS | Mod: ,,, | Performed by: ANESTHESIOLOGY

## 2023-03-02 PROCEDURE — 27201423 OPTIME MED/SURG SUP & DEVICES STERILE SUPPLY: Performed by: SURGERY

## 2023-03-02 PROCEDURE — 36620 INSERTION CATHETER ARTERY: CPT | Mod: 59,,, | Performed by: ANESTHESIOLOGY

## 2023-03-02 PROCEDURE — 85025 COMPLETE CBC W/AUTO DIFF WBC: CPT | Performed by: STUDENT IN AN ORGANIZED HEALTH CARE EDUCATION/TRAINING PROGRAM

## 2023-03-02 PROCEDURE — 83735 ASSAY OF MAGNESIUM: CPT | Performed by: STUDENT IN AN ORGANIZED HEALTH CARE EDUCATION/TRAINING PROGRAM

## 2023-03-02 PROCEDURE — 20000000 HC ICU ROOM

## 2023-03-02 PROCEDURE — 27201037 HC PRESSURE MONITORING SET UP

## 2023-03-02 PROCEDURE — 25000003 PHARM REV CODE 250: Performed by: SURGERY

## 2023-03-02 PROCEDURE — 63600175 PHARM REV CODE 636 W HCPCS: Performed by: NURSE ANESTHETIST, CERTIFIED REGISTERED

## 2023-03-02 PROCEDURE — 63600175 PHARM REV CODE 636 W HCPCS: Performed by: SURGERY

## 2023-03-02 PROCEDURE — 99291 CRITICAL CARE FIRST HOUR: CPT | Mod: ,,, | Performed by: ANESTHESIOLOGY

## 2023-03-02 PROCEDURE — 63600175 PHARM REV CODE 636 W HCPCS: Performed by: HOSPITALIST

## 2023-03-02 PROCEDURE — 71000033 HC RECOVERY, INTIAL HOUR: Performed by: SURGERY

## 2023-03-02 PROCEDURE — 25000003 PHARM REV CODE 250: Performed by: NURSE ANESTHETIST, CERTIFIED REGISTERED

## 2023-03-02 PROCEDURE — 85025 COMPLETE CBC W/AUTO DIFF WBC: CPT | Mod: 91 | Performed by: STUDENT IN AN ORGANIZED HEALTH CARE EDUCATION/TRAINING PROGRAM

## 2023-03-02 PROCEDURE — 86900 BLOOD TYPING SEROLOGIC ABO: CPT | Mod: 91

## 2023-03-02 PROCEDURE — 80053 COMPREHEN METABOLIC PANEL: CPT | Performed by: STUDENT IN AN ORGANIZED HEALTH CARE EDUCATION/TRAINING PROGRAM

## 2023-03-02 PROCEDURE — 71000039 HC RECOVERY, EACH ADD'L HOUR: Performed by: SURGERY

## 2023-03-02 PROCEDURE — D9220A PRA ANESTHESIA: Mod: ,,, | Performed by: ANESTHESIOLOGY

## 2023-03-02 PROCEDURE — 86922 COMPATIBILITY TEST ANTIGLOB: CPT | Performed by: STUDENT IN AN ORGANIZED HEALTH CARE EDUCATION/TRAINING PROGRAM

## 2023-03-02 PROCEDURE — P9021 RED BLOOD CELLS UNIT: HCPCS | Performed by: STUDENT IN AN ORGANIZED HEALTH CARE EDUCATION/TRAINING PROGRAM

## 2023-03-02 PROCEDURE — 37000008 HC ANESTHESIA 1ST 15 MINUTES: Performed by: SURGERY

## 2023-03-02 PROCEDURE — 85610 PROTHROMBIN TIME: CPT | Performed by: STUDENT IN AN ORGANIZED HEALTH CARE EDUCATION/TRAINING PROGRAM

## 2023-03-02 PROCEDURE — 99499 UNLISTED E&M SERVICE: CPT | Mod: ,,, | Performed by: SURGERY

## 2023-03-02 PROCEDURE — 85730 THROMBOPLASTIN TIME PARTIAL: CPT | Performed by: STUDENT IN AN ORGANIZED HEALTH CARE EDUCATION/TRAINING PROGRAM

## 2023-03-02 PROCEDURE — C2623 CATH, TRANSLUMIN, DRUG-COAT: HCPCS | Performed by: SURGERY

## 2023-03-02 DEVICE — GRAFT THIN WALL 8 X 40CM: Type: IMPLANTABLE DEVICE | Site: GROIN | Status: FUNCTIONAL

## 2023-03-02 DEVICE — IMPLANTABLE DEVICE: Type: IMPLANTABLE DEVICE | Site: ARTERIAL | Status: FUNCTIONAL

## 2023-03-02 RX ORDER — FENTANYL CITRATE 50 UG/ML
INJECTION, SOLUTION INTRAMUSCULAR; INTRAVENOUS
Status: DISCONTINUED | OUTPATIENT
Start: 2023-03-02 | End: 2023-03-02

## 2023-03-02 RX ORDER — SODIUM,POTASSIUM PHOSPHATES 280-250MG
2 POWDER IN PACKET (EA) ORAL
Status: DISCONTINUED | OUTPATIENT
Start: 2023-03-02 | End: 2023-03-08

## 2023-03-02 RX ORDER — HEPARIN SODIUM,PORCINE/D5W 25000/250
0-40 INTRAVENOUS SOLUTION INTRAVENOUS CONTINUOUS
Status: DISCONTINUED | OUTPATIENT
Start: 2023-03-02 | End: 2023-03-06

## 2023-03-02 RX ORDER — PHENYLEPHRINE HYDROCHLORIDE 10 MG/ML
INJECTION INTRAVENOUS
Status: DISCONTINUED | OUTPATIENT
Start: 2023-03-02 | End: 2023-03-02

## 2023-03-02 RX ORDER — HEPARIN SODIUM 1000 [USP'U]/ML
INJECTION, SOLUTION INTRAVENOUS; SUBCUTANEOUS
Status: DISCONTINUED | OUTPATIENT
Start: 2023-03-02 | End: 2023-03-02 | Stop reason: HOSPADM

## 2023-03-02 RX ORDER — SODIUM CHLORIDE, SODIUM LACTATE, POTASSIUM CHLORIDE, CALCIUM CHLORIDE 600; 310; 30; 20 MG/100ML; MG/100ML; MG/100ML; MG/100ML
INJECTION, SOLUTION INTRAVENOUS CONTINUOUS
Status: DISCONTINUED | OUTPATIENT
Start: 2023-03-02 | End: 2023-03-03

## 2023-03-02 RX ORDER — ACETAMINOPHEN 500 MG
1000 TABLET ORAL EVERY 8 HOURS
Status: DISCONTINUED | OUTPATIENT
Start: 2023-03-02 | End: 2023-03-12

## 2023-03-02 RX ORDER — PROPOFOL 10 MG/ML
VIAL (ML) INTRAVENOUS
Status: DISCONTINUED | OUTPATIENT
Start: 2023-03-02 | End: 2023-03-02

## 2023-03-02 RX ORDER — SODIUM CHLORIDE 0.9 % (FLUSH) 0.9 %
10 SYRINGE (ML) INJECTION
Status: DISCONTINUED | OUTPATIENT
Start: 2023-03-02 | End: 2023-03-09

## 2023-03-02 RX ORDER — PROTAMINE SULFATE 10 MG/ML
INJECTION, SOLUTION INTRAVENOUS
Status: DISCONTINUED | OUTPATIENT
Start: 2023-03-02 | End: 2023-03-02

## 2023-03-02 RX ORDER — LANOLIN ALCOHOL/MO/W.PET/CERES
800 CREAM (GRAM) TOPICAL
Status: DISCONTINUED | OUTPATIENT
Start: 2023-03-02 | End: 2023-03-08

## 2023-03-02 RX ORDER — ONDANSETRON 2 MG/ML
INJECTION INTRAMUSCULAR; INTRAVENOUS
Status: DISCONTINUED | OUTPATIENT
Start: 2023-03-02 | End: 2023-03-02

## 2023-03-02 RX ORDER — PIPERACILLIN SODIUM, TAZOBACTAM SODIUM 4; .5 G/20ML; G/20ML
INJECTION, POWDER, LYOPHILIZED, FOR SOLUTION INTRAVENOUS
Status: DISCONTINUED | OUTPATIENT
Start: 2023-03-02 | End: 2023-03-02

## 2023-03-02 RX ORDER — HYDROMORPHONE HYDROCHLORIDE 1 MG/ML
0.5 INJECTION, SOLUTION INTRAMUSCULAR; INTRAVENOUS; SUBCUTANEOUS EVERY 6 HOURS PRN
Status: DISCONTINUED | OUTPATIENT
Start: 2023-03-02 | End: 2023-03-07

## 2023-03-02 RX ORDER — CLOPIDOGREL BISULFATE 75 MG/1
75 TABLET ORAL DAILY
Status: DISCONTINUED | OUTPATIENT
Start: 2023-03-02 | End: 2023-03-02

## 2023-03-02 RX ORDER — VASOPRESSIN 20 [USP'U]/ML
INJECTION, SOLUTION INTRAMUSCULAR; SUBCUTANEOUS
Status: DISCONTINUED | OUTPATIENT
Start: 2023-03-02 | End: 2023-03-02

## 2023-03-02 RX ORDER — OXYCODONE HYDROCHLORIDE 5 MG/1
5 TABLET ORAL EVERY 4 HOURS PRN
Status: DISCONTINUED | OUTPATIENT
Start: 2023-03-02 | End: 2023-03-05

## 2023-03-02 RX ORDER — ROCURONIUM BROMIDE 10 MG/ML
INJECTION, SOLUTION INTRAVENOUS
Status: DISCONTINUED | OUTPATIENT
Start: 2023-03-02 | End: 2023-03-02

## 2023-03-02 RX ORDER — LIDOCAINE HYDROCHLORIDE 20 MG/ML
INJECTION INTRAVENOUS
Status: DISCONTINUED | OUTPATIENT
Start: 2023-03-02 | End: 2023-03-02

## 2023-03-02 RX ORDER — ONDANSETRON 2 MG/ML
4 INJECTION INTRAMUSCULAR; INTRAVENOUS EVERY 8 HOURS PRN
Status: DISCONTINUED | OUTPATIENT
Start: 2023-03-02 | End: 2023-03-02

## 2023-03-02 RX ORDER — HYDROCODONE BITARTRATE AND ACETAMINOPHEN 500; 5 MG/1; MG/1
TABLET ORAL
Status: DISCONTINUED | OUTPATIENT
Start: 2023-03-02 | End: 2023-03-07

## 2023-03-02 RX ORDER — POLYETHYLENE GLYCOL 3350 17 G/17G
17 POWDER, FOR SOLUTION ORAL DAILY
Status: DISCONTINUED | OUTPATIENT
Start: 2023-03-03 | End: 2023-03-02

## 2023-03-02 RX ADMIN — PHENYLEPHRINE HYDROCHLORIDE 200 MCG: 10 INJECTION INTRAVENOUS at 02:03

## 2023-03-02 RX ADMIN — PHENYLEPHRINE HYDROCHLORIDE 200 MCG: 10 INJECTION INTRAVENOUS at 12:03

## 2023-03-02 RX ADMIN — LIDOCAINE HYDROCHLORIDE 100 MG: 20 INJECTION INTRAVENOUS at 11:03

## 2023-03-02 RX ADMIN — FENTANYL CITRATE 50 MCG: 50 INJECTION INTRAMUSCULAR; INTRAVENOUS at 06:03

## 2023-03-02 RX ADMIN — CILOSTAZOL 50 MG: 50 TABLET ORAL at 09:03

## 2023-03-02 RX ADMIN — HEPARIN SODIUM 12 UNITS/KG/HR: 10000 INJECTION, SOLUTION INTRAVENOUS at 10:03

## 2023-03-02 RX ADMIN — SODIUM CHLORIDE, POTASSIUM CHLORIDE, SODIUM LACTATE AND CALCIUM CHLORIDE: 600; 310; 30; 20 INJECTION, SOLUTION INTRAVENOUS at 07:03

## 2023-03-02 RX ADMIN — PIPERACILLIN SODIUM AND TAZOBACTAM SODIUM 4.5 G: 4; .5 INJECTION, POWDER, FOR SOLUTION INTRAVENOUS at 12:03

## 2023-03-02 RX ADMIN — VASOPRESSIN 1 UNITS: 20 INJECTION INTRAVENOUS at 03:03

## 2023-03-02 RX ADMIN — VASOPRESSIN 0.04 UNITS/MIN: 20 INJECTION INTRAVENOUS at 03:03

## 2023-03-02 RX ADMIN — VASOPRESSIN 2 UNITS: 20 INJECTION INTRAVENOUS at 03:03

## 2023-03-02 RX ADMIN — INSULIN DETEMIR 20 UNITS: 100 INJECTION, SOLUTION SUBCUTANEOUS at 02:03

## 2023-03-02 RX ADMIN — INSULIN ASPART 4 UNITS: 100 INJECTION, SOLUTION INTRAVENOUS; SUBCUTANEOUS at 08:03

## 2023-03-02 RX ADMIN — LOSARTAN POTASSIUM 100 MG: 50 TABLET, FILM COATED ORAL at 08:03

## 2023-03-02 RX ADMIN — HYDROCODONE BITARTRATE AND ACETAMINOPHEN 1 TABLET: 5; 325 TABLET ORAL at 05:03

## 2023-03-02 RX ADMIN — PIPERACILLIN SODIUM AND TAZOBACTAM SODIUM 4.5 G: 4; .5 INJECTION, POWDER, LYOPHILIZED, FOR SOLUTION INTRAVENOUS at 05:03

## 2023-03-02 RX ADMIN — HEPARIN SODIUM 2000 UNITS: 1000 INJECTION, SOLUTION INTRAVENOUS; SUBCUTANEOUS at 04:03

## 2023-03-02 RX ADMIN — HEPARIN SODIUM 2000 UNITS: 1000 INJECTION, SOLUTION INTRAVENOUS; SUBCUTANEOUS at 03:03

## 2023-03-02 RX ADMIN — CARVEDILOL 12.5 MG: 12.5 TABLET, FILM COATED ORAL at 08:03

## 2023-03-02 RX ADMIN — INSULIN DETEMIR 20 UNITS: 100 INJECTION, SOLUTION SUBCUTANEOUS at 09:03

## 2023-03-02 RX ADMIN — SODIUM CHLORIDE, POTASSIUM CHLORIDE, SODIUM LACTATE AND CALCIUM CHLORIDE: 600; 310; 30; 20 INJECTION, SOLUTION INTRAVENOUS at 09:03

## 2023-03-02 RX ADMIN — SODIUM CHLORIDE 0.75 MCG/KG/MIN: 9 INJECTION, SOLUTION INTRAVENOUS at 12:03

## 2023-03-02 RX ADMIN — HYDROCHLOROTHIAZIDE 25 MG: 25 TABLET ORAL at 08:03

## 2023-03-02 RX ADMIN — PHENYLEPHRINE HYDROCHLORIDE 100 MCG: 10 INJECTION INTRAVENOUS at 12:03

## 2023-03-02 RX ADMIN — ONDANSETRON 4 MG: 2 INJECTION INTRAMUSCULAR; INTRAVENOUS at 05:03

## 2023-03-02 RX ADMIN — ATORVASTATIN CALCIUM 40 MG: 40 TABLET, FILM COATED ORAL at 09:03

## 2023-03-02 RX ADMIN — PIPERACILLIN SODIUM AND TAZOBACTAM SODIUM 4.5 G: 4; .5 INJECTION, POWDER, FOR SOLUTION INTRAVENOUS at 06:03

## 2023-03-02 RX ADMIN — SODIUM CHLORIDE, SODIUM GLUCONATE, SODIUM ACETATE, POTASSIUM CHLORIDE, MAGNESIUM CHLORIDE, SODIUM PHOSPHATE, DIBASIC, AND POTASSIUM PHOSPHATE: .53; .5; .37; .037; .03; .012; .00082 INJECTION, SOLUTION INTRAVENOUS at 11:03

## 2023-03-02 RX ADMIN — CARVEDILOL 12.5 MG: 12.5 TABLET, FILM COATED ORAL at 09:03

## 2023-03-02 RX ADMIN — CILOSTAZOL 50 MG: 50 TABLET ORAL at 08:03

## 2023-03-02 RX ADMIN — PHENYLEPHRINE HYDROCHLORIDE 100 MCG: 10 INJECTION INTRAVENOUS at 02:03

## 2023-03-02 RX ADMIN — PROPOFOL 120 MG: 10 INJECTION, EMULSION INTRAVENOUS at 11:03

## 2023-03-02 RX ADMIN — AMLODIPINE BESYLATE 10 MG: 10 TABLET ORAL at 08:03

## 2023-03-02 RX ADMIN — PIPERACILLIN SODIUM AND TAZOBACTAM SODIUM 4.5 G: 2; .25 INJECTION, POWDER, LYOPHILIZED, FOR SOLUTION INTRAVENOUS at 03:03

## 2023-03-02 RX ADMIN — ROCURONIUM BROMIDE 70 MG: 10 INJECTION INTRAVENOUS at 11:03

## 2023-03-02 RX ADMIN — PROTAMINE SULFATE 55 MG: 10 INJECTION, SOLUTION INTRAVENOUS at 05:03

## 2023-03-02 RX ADMIN — VANCOMYCIN HYDROCHLORIDE 1000 MG: 1 INJECTION, POWDER, LYOPHILIZED, FOR SOLUTION INTRAVENOUS at 02:03

## 2023-03-02 RX ADMIN — ROCURONIUM BROMIDE 30 MG: 10 INJECTION INTRAVENOUS at 03:03

## 2023-03-02 RX ADMIN — SUGAMMADEX 200 MG: 100 INJECTION, SOLUTION INTRAVENOUS at 06:03

## 2023-03-02 RX ADMIN — ROCURONIUM BROMIDE 30 MG: 10 INJECTION INTRAVENOUS at 01:03

## 2023-03-02 RX ADMIN — ACETAMINOPHEN 1000 MG: 500 TABLET ORAL at 09:03

## 2023-03-02 RX ADMIN — ASPIRIN 81 MG: 81 TABLET, COATED ORAL at 08:03

## 2023-03-02 RX ADMIN — FENTANYL CITRATE 50 MCG: 50 INJECTION INTRAMUSCULAR; INTRAVENOUS at 11:03

## 2023-03-02 RX ADMIN — HEPARIN SODIUM 8000 UNITS: 1000 INJECTION, SOLUTION INTRAVENOUS; SUBCUTANEOUS at 03:03

## 2023-03-02 NOTE — PROGRESS NOTES
[facility]  Vascular Surgery  Progress Note    Patient Name: eNlsy Marino  MRN: [id number]  Admission Date: 2/27/2023  Primary Care Provider: Juan F Garay    Subjective:     Interval History: NAEON. Patient transferred to [facility] for surgery with Dr. Dunbar today. Patient with dry gangrene of dry feet, possibly requiring future TMA. Heparin stopped.     Post-Op Info:  Procedure(s) (LRB):  CREATION, BYPASS, ARTERIAL, FEMORAL TO FEMORAL, USING GRAFT, LEFT FEMORAL ENDARTERECTOMY, LEFT ILIAC ARTERY STENT PLACEMENT, RIGHT LOWER EXTREMITY ANGIOGRAM AND POSSIBLE INTERVENTION (Bilateral)           Medications:  Continuous Infusions:   heparin (porcine) in D5W Stopped (03/02/23 0636)     Scheduled Meds:   amLODIPine  10 mg Oral Daily    aspirin  81 mg Oral Daily    atorvastatin  40 mg Oral QHS    carvediloL  12.5 mg Oral BID    cilostazoL  50 mg Oral BID    hydroCHLOROthiazide  25 mg Oral Daily    insulin detemir U-100  20 Units Subcutaneous QHS    losartan  100 mg Oral Daily    piperacillin-tazobactam (ZOSYN) IVPB  4.5 g Intravenous Q8H    polyethylene glycol  17 g Oral Daily    vancomycin (VANCOCIN) IVPB  1,000 mg Intravenous Q24H     PRN Meds:acetaminophen, albuterol-ipratropium, bisacodyL, dextrose 10%, dextrose 10%, diphenhydrAMINE, glucagon (human recombinant), glucose, glucose, HYDROcodone-acetaminophen, insulin aspart U-100, melatonin, morphine, naloxone, ondansetron, prochlorperazine, simethicone, sodium chloride 0.9%, Pharmacy to dose Vancomycin consult **AND** vancomycin - pharmacy to dose     Objective:     Vital Signs (Most Recent):  Temp: 97.8 °F (36.6 °C) (03/02/23 0419)  Pulse: 83 (03/02/23 0419)  Resp: 20 (03/02/23 0518)  BP: (!) 148/55 (03/02/23 0419)  SpO2: (!) 93 % (03/02/23 0419)   Vital Signs (24h Range):  Temp:  [97.5 °F (36.4 °C)-98.7 °F (37.1 °C)] 97.8 °F (36.6 °C)  Pulse:  [80-93] 83  Resp:  [18-20] 20  SpO2:  [92 %-96 %] 93 %  BP: (118-148)/(55-63) 148/55         Physical  Exam  Constitutional:       General: She is not in acute distress.     Appearance: Normal appearance. She is normal weight. She is not ill-appearing.   Cardiovascular:      Rate and Rhythm: Normal rate and regular rhythm.      Pulses:           Femoral pulses are 0 on the right side and 0 on the left side.       Dorsalis pedis pulses are detected w/ Doppler on the right side and detected w/ Doppler on the left side.        Posterior tibial pulses are detected w/ Doppler on the right side and detected w/ Doppler on the left side.      Heart sounds: Normal heart sounds.   Musculoskeletal:      Comments: R toe dry gangrene   Skin:     General: Skin is warm and dry.   Neurological:      General: No focal deficit present.      Mental Status: She is alert and oriented to person, place, and time.   Psychiatric:         Mood and Affect: Mood normal.       Significant Labs:  CBC:   Recent Labs   Lab 03/01/23  0938   WBC 8.45   RBC 3.86*   HGB 9.3*   HCT 30.3*      MCV 79*   MCH 24.1*   MCHC 30.7*     CMP:   Recent Labs   Lab 03/01/23  0606   *   CALCIUM 9.3      K 4.3   CO2 24      BUN 26*   CREATININE 1.6*       Significant Diagnostics:  I have reviewed all pertinent imaging results/findings within the past 24 hours.    Assessment/Plan:     * Critical limb ischemia with history of revascularization of same extremity  -Cardiology deemed acceptable risk for LE bypass  -To hybrid OR surgery 3/2/23  -Cont Heparin drip - stop 6 hrs preop  -NPO at midnight  -Pain control prn and wound care   -Podiatry consult: may need R SIRIA Maciel MD  Vascular Surgery  [facility]

## 2023-03-02 NOTE — CONSULTS
"[facility]  Vascular Surgery  Consult Note    Inpatient consult to Vascular Surgery  Consult performed by: Gilbert Gomez MD  Consult ordered by: Rodo Shirley MD        Subjective:     Chief Complaint/Reason for Admission: PAD    History of Present Illness: Nelsy Marino is a 82 y.o. lady with severe PAD, hypertension, type 2 diabetes, history of DVT who presents with right foot pain. Patient reports worsening right foot pain, swelling and discoloration that started after she had an ingrown toenail removed by her podiatrist. She reports that her discoloration started since 2/7. Of note, the patient underwent an angiogram on 02/23 by vascular surgery. She was transferred to [facility] for vascular bypass. Currently, plan is to perform a left fem-rigth SFA redo vs axillary-profunda bypass.      Medications Prior to Admission   Medication Sig Dispense Refill Last Dose    amlodipine (NORVASC) 10 MG tablet Take 10 mg by mouth once daily.       aspirin (ECOTRIN) 81 MG EC tablet Take 81 mg by mouth once daily.       BLOOD SUGAR DIAGNOSTIC (TRUE METRIX GLUCOSE TEST STRIP MISC) by Misc.(Non-Drug; Combo Route) route.       carvediloL (COREG) 12.5 MG tablet Take 12.5 mg by mouth 2 (two) times daily.       cilostazol (PLETAL) 50 MG Tab Take 1 tablet (50 mg total) by mouth 2 (two) times daily. If patient tolerate medication after 4 weeks, increase dose to 100 mg twice daily. 60 tablet 11     glipiZIDE (GLUCOTROL) 10 MG tablet Take 10 mg by mouth 2 (two) times daily before meals.       insulin aspart protamine-insulin aspart (NOVOLOG 70/30) 100 unit/mL (70-30) InPn pen Inject 50 Units into the skin every morning.       insulin aspart protamine-insulin aspart (NOVOLOG 70/30) 100 unit/mL (70-30) InPn pen Inject 50 Units into the skin every evening.       insulin syringe-needle,dispos. 0.3 mL 30 gauge x 5/16" Syrg by Misc.(Non-Drug; Combo Route) route.       losartan-hydrochlorothiazide 100-25 mg (HYZAAR) 100-25 mg per " tablet Take 1 tablet by mouth once daily.       metformin (GLUCOPHAGE) 1000 MG tablet Take 1,000 mg by mouth 2 (two) times daily with meals.       ondansetron (ZOFRAN) 8 MG tablet   0        Review of patient's allergies indicates:   Allergen Reactions    Motrin [ibuprofen] Itching    Zosyn [piperacillin-tazobactam] Itching       Past Medical History:   Diagnosis Date    CHF (congestive heart failure)     Diabetes mellitus     DVT (deep venous thrombosis)     Hypertension     Miscarriage      Past Surgical History:   Procedure Laterality Date     femoral vascular surgery       ANGIOGRAPHY OF LOWER EXTREMITY Right 2023    Procedure: Angiogram Extremity Unilateral;  Surgeon: Danny Dunbar MD;  Location: [facility];  Service: Vascular;  Laterality: Right;  RN PHONE PREOP 2023----CK CONSENT    CREATION OF ILIOFEMORAL ARTERY BYPASS Right 3/20/2019    Procedure: CREATION, BYPASS, ARTERIAL, ILIAC TO FEMORAL, RIGHT LOWER EXTREMITY, RIGHT PROFUNDAPLASTY;  Surgeon: Danny Dunbar MD;  Location: [facility] OR;  Service: Vascular;  Laterality: Right;  11:00AM START PER ANNE RICHARDS PREOP 3/13/2019  ACT'S, CELL SAVER, GRAFTS, BOOK WATER---NEED H/P  CONSENT IN AM------HGBA1C    HYSTERECTOMY      ?unsure cervix present    INCISION AND DRAINAGE Right 2019    Procedure: Incision and Drainage - R groin washout, possible muscle flap;  Surgeon: Danny Dunbar MD;  Location: [facility] OR [address] [place] [address];  Service: Vascular;  Laterality: Right;  groin washout    INCISION AND DRAINAGE OF GROIN Right 5/3/2019    Procedure: Incision and Drainage R groin;  Surgeon: Danny Dunbar MD;  Location: [facility] OR [address] [place] [address];  Service: Vascular;  Laterality: Right;     Family History       Problem Relation (Age of Onset)    Diabetes Father    Hypertension Father          Tobacco Use    Smoking status: Former     Types: Cigarettes     Quit date: 1980     Years since quittin.1    Smokeless tobacco: Never   Substance and  Sexual Activity    Alcohol use: No    Drug use: No    Sexual activity: Never     Review of Systems   Constitutional:  Negative for chills and fever.   HENT:  Negative for hearing loss and trouble swallowing.    Eyes:  Negative for discharge and visual disturbance.   Respiratory:  Negative for chest tightness and shortness of breath.    Cardiovascular:  Negative for chest pain and palpitations.   Gastrointestinal:  Negative for abdominal distention, abdominal pain, nausea and vomiting.   Genitourinary:  Negative for difficulty urinating and hematuria.   Musculoskeletal:  Negative for arthralgias and back pain.   Skin:  Positive for color change and wound. Negative for rash.   Neurological:  Negative for dizziness and headaches.   Objective:     Vital Signs (Most Recent):  Temp: 99.1 °F (37.3 °C) (03/02/23 0714)  Pulse: 82 (03/02/23 0714)  Resp: 18 (03/02/23 0714)  BP: (!) 132/59 (03/02/23 0714)  SpO2: 96 % (03/02/23 0714)   Vital Signs (24h Range):  Temp:  [97.5 °F (36.4 °C)-99.1 °F (37.3 °C)] 99.1 °F (37.3 °C)  Pulse:  [80-93] 82  Resp:  [18-20] 18  SpO2:  [92 %-96 %] 96 %  BP: (118-148)/(55-63) 132/59     Weight: 74 kg (163 lb 2.3 oz)  Body mass index is 28 kg/m².    Physical Exam  Constitutional:       General: She is not in acute distress.     Appearance: Normal appearance. She is normal weight. She is not ill-appearing.   Cardiovascular:      Rate and Rhythm: Normal rate and regular rhythm.      Pulses:           Femoral pulses are 0 on the right side and 0 on the left side.       Dorsalis pedis pulses are detected w/ Doppler on the right side and detected w/ Doppler on the left side.        Posterior tibial pulses are detected w/ Doppler on the right side and detected w/ Doppler on the left side.      Heart sounds: Normal heart sounds.   Musculoskeletal:      Comments: R toe dry gangrene   Skin:     General: Skin is warm and dry.   Neurological:      General: No focal deficit present.      Mental Status: She  is alert and oriented to person, place, and time.   Psychiatric:         Mood and Affect: Mood normal.       Significant Labs:  CBC:   Recent Labs   Lab 03/01/23  0938   WBC 8.45   RBC 3.86*   HGB 9.3*   HCT 30.3*      MCV 79*   MCH 24.1*   MCHC 30.7*     CMP:   Recent Labs   Lab 03/01/23  0606   *   CALCIUM 9.3      K 4.3   CO2 24      BUN 26*   CREATININE 1.6*       Significant Diagnostics:  I have reviewed and interpreted all pertinent imaging results/findings within the past 24 hours.    Assessment/Plan:     * Critical limb ischemia with history of revascularization of same extremity  Nelsy Marino is a 82 y.o. lady with severe PAD    -Cardiology deemed acceptable risk for LE bypass  -To hybrid OR surgery 3/2/23  - plan for left fem to right SFA bypass with possible angioplasty and possible R axillary to profunda bypass  -hold hep drip now  -NPO since midnight  -Pain control prn and wound care   -May need R TMA, podiatry consulted        Thank you for your consult. I will follow-up with patient. Please contact us if you have any additional questions.    Gilbert Gomez MD  Vascular Surgery  [facility]

## 2023-03-02 NOTE — CONSULTS
Dry gangrene     Attempted to see patient earlier but patient was in procedure. Will follow up with patient later    Thank you for your consult. Will follow up with patient.     Danisha Hanley DPM PGY-1  Podiatric Medicine & Surgery  [facility]  Secure Chat Preferred  Mobile: [phone]  Pager: [phone]

## 2023-03-02 NOTE — PROGRESS NOTES
PreOP complete. Type and screen sent to blood bank. Patient's , Dimas set up for text messaging and will take patient's belongings (purse and cell phone).

## 2023-03-02 NOTE — PLAN OF CARE
"PLAN OF CARE NOTE    I evaluated the patient upon arrival to [facility].     The patient was alert, oriented and able to answer all questions appropriately. She was in no acute distress.    From  Transfer Note: "Nelsy Marino is a 82 y.o. female with severe PAD, hypertension, type 2 diabetes, history of DVT who presented to the  ED on 2/27 with right foot pain.  He endorses pain and discoloration since 2/7 when he had a ingrown toenail removed, which has gotten progressively worse.  Of note, he recently underwent LE angiogram by Dr. Tran on 2/23 and per the op report, he was able to get successful revascularization/resolution of the stenosis.     Upon arrival to the ER, vitals were temp 98.3F, , /86.  Labs showed WBC 10, , CRP 56.  XR of the foot was negative.  MRI of the right forefoot showed marrow edematous changes at the 5th toe, which could be periostitis vs early osteomyelitis.  Podiatry and Vascular Surgery were consulted for wet gangrene, and he was started on Vanc and Zosyn.  Podiatry wants to continue local wound care until amputation level can be determined.  Vascular Surgery wants to perform a fem-fem redo vs axillary- profunda bypass.  Cardiology was consulted for preop clearance, who felt like he was appropriate risk for surgery and anesthesia.  Dr. Dunbar of Vascular Surgery would like him transferred [place] [facility] [place] on 3/2.  He was started on a Heparin gtt, and his home Aspirin and Peltal were continued.  Pulses are not palpable in the RLE.  Dr. Dunbar wants to continue the Heparin gtt, and stop it 6h preop.  He will transfer to [facility] for further management."  "

## 2023-03-02 NOTE — ASSESSMENT & PLAN NOTE
Nelsy Marino is a 82 y.o. lady with severe PAD    -Cardiology deemed acceptable risk for LE bypass  -To hybrid OR surgery 3/2/23  - plan for left fem to right SFA bypass with possible angioplasty and possible R axillary to profunda bypass  -hold hep drip now  -NPO since midnight  -Pain control prn and wound care   -May need R TMA, podiatry consulted

## 2023-03-02 NOTE — NURSING
Pt left floor via stretcher with Heparin infusing at 12 units/kg; report called by previous nurse; pt belongings with family

## 2023-03-02 NOTE — ANESTHESIA PREPROCEDURE EVALUATION
"[facility]  Anesthesia Pre-Operative Evaluation         Patient Name: Nelsy Marino  YOB: 1940  MRN: [id number]    SUBJECTIVE:     Pre-operative evaluation for Procedure(s) (LRB):  CREATION, BYPASS, ARTERIAL, FEMORAL TO FEMORAL, USING GRAFT, LEFT FEMORAL ENDARTERECTOMY, LEFT ILIAC ARTERY STENT PLACEMENT, RIGHT LOWER EXTREMITY ANGIOGRAM AND POSSIBLE INTERVENTION (Bilateral)     03/02/2023    Nelsy Marino is a 82 y.o. female w/ a significant PMHx of severe PAD, HTN, HFpEF, T2DM, and prior hx of DVT who initially presented to [facility] with R foot dry gangrene on 2/27. Patient recently underwent angiogram with successful revascularization to the RLE by Dr. Dunbar (2/23). Now with concern for critical limb ischemia of the same RLE. Transferred to [facility] per Dr. Dunbar's recommendation for hybrid OR surgery. Cardiology consulted at OSH for preop clearance, deemed to be at "appropriate risk" for surgery/anesthesia. On arrival, remains HDS, on RA. Continues on heparin gtt. Plan for fem-fem redo (vs axillary-profunda bypass)      Nuclear Stress Test 2/28/23    Normal myocardial perfusion scan. There is no evidence of myocardial ischemia or infarction.    The visually estimated ejection fraction is normal during stress.    There is normal wall motion post stress.      TTE 2/28/23   The left ventricle is normal in size with mild concentric hypertrophy and normal systolic function.   The estimated ejection fraction is 65%.   Grade I left ventricular diastolic dysfunction.   Normal right ventricular size with normal right ventricular systolic function.   The estimated PA systolic pressure is 37 mmHg.      Patient now presents for the above procedure(s).      LDA:        Peripheral IV - Single Lumen 03/01/23 1449 20 G Left Antecubital (Active)   Number of days: 0            Peripheral IV - Single Lumen 03/02/23 0003 20 G Posterior;Right Forearm (Active)   Number of days: 0    "     Prev airway:   Placement Date: 05/03/19; Placement Time: 1233; Method of Intubation: Direct laryngoscopy; Inserted by: CRNA; Airway Device: Endotracheal Tube; Mask Ventilation: Easy - oral; Intubated: Postinduction; Blade: Dayne #3; Airway Device Size: 7.0; Style: Cuffed; Cuff Inflation: Minimal occlusive pressure; Inflation Amount: 5; Placement Verified By: Capnometry, Auscultation, ETT Condensation; Grade: Grade I; Complicating Factors: None; Intubation Findings: Positive EtCO2, Bilateral breath sounds;  Depth of Insertion: 21; Securment: Lips; Complications: None; Breath Sounds: Equal Bilateral; Insertion Attempts: 1; Removal Date: 05/03/19;  Removal Time: 1325      Drips:    heparin (porcine) in D5W 12 Units/kg/hr (03/02/23 0131)        Patient Active Problem List   Diagnosis    Lichen sclerosus et atrophicus    PVD (peripheral vascular disease)    Essential hypertension    Type 2 diabetes mellitus    Diastolic CHF, chronic    Obesity (BMI 30-39.9)    History of DVT (deep vein thrombosis)    Wound dehiscence    Fluid collection at surgical site    Cellulitis    Postoperative seroma of musculoskeletal structure after non-musculoskeletal procedure    Abscess of right groin    Critical limb ischemia with history of revascularization of same extremity    Sepsis    Hypoglycemia    Advanced care planning/counseling discussion    Preoperative clearance       Review of patient's allergies indicates:   Allergen Reactions    Motrin [ibuprofen] Itching    Zosyn [piperacillin-tazobactam] Itching       Current Outpatient Medications:    Current Facility-Administered Medications:     acetaminophen tablet 650 mg, 650 mg, Oral, Q8H PRN, Ramana Lopez MD    albuterol-ipratropium 2.5 mg-0.5 mg/3 mL nebulizer solution 3 mL, 3 mL, Nebulization, Q4H PRN, Ramana Lopez MD    amLODIPine tablet 10 mg, 10 mg, Oral, Daily, Ramana Lopez MD, 10 mg at 03/01/23 0856    aspirin EC tablet 81 mg, 81 mg, Oral,  Daily, Ramana Lopez MD, 81 mg at 03/01/23 0856    atorvastatin tablet 40 mg, 40 mg, Oral, QHS, Demarco Wright MD, 40 mg at 03/01/23 2305    bisacodyL suppository 10 mg, 10 mg, Rectal, Daily PRN, Ramana Lopez MD    carvediloL tablet 12.5 mg, 12.5 mg, Oral, BID, Ramana Lopez MD, 12.5 mg at 03/01/23 2148    cilostazoL tablet 50 mg, 50 mg, Oral, BID, Ramana Lopez MD, 50 mg at 03/01/23 2148    dextrose 10% bolus 125 mL 125 mL, 12.5 g, Intravenous, PRN, Ramana Lopez MD    dextrose 10% bolus 250 mL 250 mL, 25 g, Intravenous, PRN, Ramana Lopez MD    diphenhydrAMINE capsule 25 mg, 25 mg, Oral, Q6H PRN, Ramana Lopez MD, 25 mg at 02/27/23 2145    glucagon (human recombinant) injection 1 mg, 1 mg, Intramuscular, PRN, Ramana Lopez MD, 1 mg at 02/28/23 0738    glucose chewable tablet 16 g, 16 g, Oral, PRN, Ramana Lopez MD    glucose chewable tablet 24 g, 24 g, Oral, PRN, Ramana Lopez MD    heparin 25,000 units in dextrose 5% 250 ml (100 units/mL) infusion MINIMAL INTENSITY nomogram - [facility], 0-40 Units/kg/hr (Adjusted), Intravenous, Continuous, Danny Dunbar MD, Last Rate: 7.5 mL/hr at 03/02/23 0131, 12 Units/kg/hr at 03/02/23 0131    hydroCHLOROthiazide tablet 25 mg, 25 mg, Oral, Daily, Ramana Lopez MD, 25 mg at 03/01/23 0856    HYDROcodone-acetaminophen 5-325 mg per tablet 1 tablet, 1 tablet, Oral, Q6H PRN, Ramana Lopez MD, 1 tablet at 03/01/23 2148    insulin aspart U-100 pen 0-5 Units, 0-5 Units, Subcutaneous, QID (AC + HS) PRN, Ramana Lopez MD, 2 Units at 03/01/23 2306    insulin detemir U-100 pen 20 Units, 20 Units, Subcutaneous, QHS, Keon Ghosh MD    losartan tablet 100 mg, 100 mg, Oral, Daily, Ramana Lopez MD, 100 mg at 03/01/23 0856    melatonin tablet 6 mg, 6 mg, Oral, Nightly PRN, Ramana Lopez MD    morphine injection 4 mg, 4 mg, Intravenous, Q6H PRN, Ramana Lopez MD, 4 mg at 02/28/23 1511    naloxone 0.4 mg/mL injection 0.02 mg, 0.02 mg, Intravenous, PRN, Ramana Lopez MD     ondansetron injection 4 mg, 4 mg, Intravenous, Q8H PRN, Ramana Lopez MD    piperacillin-tazobactam (ZOSYN) 4.5 g in dextrose 5 % in water (D5W) 5 % 100 mL IVPB (MB+), 4.5 g, Intravenous, Q8H, Rodo Shirley MD, Last Rate: 25 mL/hr at 03/02/23 0007, 4.5 g at 03/02/23 0007    polyethylene glycol packet 17 g, 17 g, Oral, Daily, Ramana Lopez MD, 17 g at 02/28/23 0856    prochlorperazine injection Soln 5 mg, 5 mg, Intravenous, Q6H PRN, Ramana Lopez MD    simethicone chewable tablet 80 mg, 1 tablet, Oral, QID PRN, Ramana Lopez MD    sodium chloride 0.9% flush 10 mL, 10 mL, Intravenous, Q12H PRN, Ramana Lopez MD    vancomycin (VANCOCIN) 1,000 mg in dextrose 5 % (D5W) 250 mL IVPB (Vial-Mate), 1,000 mg, Intravenous, Q24H, Rodo Shirley MD, Stopped at 02/28/23 2243    Pharmacy to dose Vancomycin consult, , , Once **AND** vancomycin - pharmacy to dose, , Intravenous, pharmacy to manage frequency, Rodo Shirley MD    Past Surgical History:   Procedure Laterality Date     femoral vascular surgery       ANGIOGRAPHY OF LOWER EXTREMITY Right 2/23/2023    Procedure: Angiogram Extremity Unilateral;  Surgeon: Danny Dunbar MD;  Location: [facility] OR;  Service: Vascular;  Laterality: Right;  RN PHONE PREOP 2/20/2023----CK CONSENT    CREATION OF ILIOFEMORAL ARTERY BYPASS Right 3/20/2019    Procedure: CREATION, BYPASS, ARTERIAL, ILIAC TO FEMORAL, RIGHT LOWER EXTREMITY, RIGHT PROFUNDAPLASTY;  Surgeon: Danny Dunbar MD;  Location: [facility] OR;  Service: Vascular;  Laterality: Right;  11:00AM START PER ANNE RICHARDS PREOP 3/13/2019  ACT'S, CELL SAVER, GRAFTS, BOOK WATER---NEED H/P  CONSENT IN AM------HGBA1C    HYSTERECTOMY      ?unsure cervix present    INCISION AND DRAINAGE Right 4/12/2019    Procedure: Incision and Drainage - R groin washout, possible muscle flap;  Surgeon: Danny Dunbar MD;  Location: [facility] OR 2ND FLR;  Service: Vascular;  Laterality: Right;  groin washout    INCISION AND DRAINAGE OF GROIN  Right 5/3/2019    Procedure: Incision and Drainage R groin;  Surgeon: Danny Dunbar MD;  Location: [facility] OR [address] [place] [address];  Service: Vascular;  Laterality: Right;       Social History     Socioeconomic History    Marital status:    Tobacco Use    Smoking status: Former     Types: Cigarettes     Quit date:      Years since quittin.1    Smokeless tobacco: Never   Substance and Sexual Activity    Alcohol use: No    Drug use: No    Sexual activity: Never       OBJECTIVE:     Vital Signs Range (Last 24H):  Temp:  [36.4 °C (97.5 °F)-37.1 °C (98.7 °F)]   Pulse:  [80-93]   Resp:  [18-20]   BP: (118-145)/(56-65)   SpO2:  [92 %-96 %]       Significant Labs:  Lab Results   Component Value Date    WBC 8.45 2023    HGB 9.3 (L) 2023    HCT 30.3 (L) 2023     2023    CHOL 109 (L) 2023    TRIG 58 2023    HDL 38 (L) 2023    ALT 11 2023    AST 22 2023     2023    K 4.3 2023     2023    CREATININE 1.6 (H) 2023    BUN 26 (H) 2023    CO2 24 2023    INR 1.1 2023    HGBA1C 8.2 (H) 2023       Microbiology Results (last 7 days)     Procedure Component Value Units Date/Time    Blood culture #2 **CANNOT BE ORDERED STAT** [[id number]] Collected: 23    Order Status: Completed Specimen: Blood from Peripheral, Antecubital, Right Updated: 23     Blood Culture, Routine No Growth to date      No Growth to date      No Growth to date    Blood culture #1 **CANNOT BE ORDERED STAT** [[id number]] Collected: 23 1428    Order Status: Completed Specimen: Blood from Peripheral, Antecubital, Left Updated: 23 1503     Blood Culture, Routine No Growth to date      No Growth to date      No Growth to date          Diagnostic Studies:    EKG:   Results for orders placed or performed during the hospital encounter of 23   EKG 12-lead    Collection Time: 23  2:47 PM    Narrative     Test Reason : Z01.810,    Vent. Rate : 106 BPM     Atrial Rate : 106 BPM     P-R Int : 156 ms          QRS Dur : 076 ms      QT Int : 326 ms       P-R-T Axes : 040 037 002 degrees     QTc Int : 433 ms    Sinus tachycardia  Cannot rule out Inferior infarct ,age undetermined  Abnormal ECG  When compared with ECG of 21-MAR-2019 07:47,  Significant changes have occurred  Confirmed by Demarco Wright MD ([id number]) on 2/28/2023 2:15:58 PM    Referred By: [email or web address]   SELF           Confirmed By:Demarco Wright MD       2D ECHO:  TTE:  Results for orders placed or performed during the hospital encounter of 02/27/23   Echo   Result Value Ref Range    BSA 1.83 m2    TDI SEPTAL 0.05 m/s    LV LATERAL E/E' RATIO 20.50 m/s    LV SEPTAL E/E' RATIO 16.40 m/s    LA WIDTH 4.10 cm    IVC diameter 11 cm    Left Ventricular Outflow Tract Mean Velocity 1.01 cm/s    Left Ventricular Outflow Tract Mean Gradient 4.81 mmHg    TDI LATERAL 0.04 m/s    LVIDd 3.63 3.5 - 6.0 cm    IVS 1.02 0.6 - 1.1 cm    Posterior Wall 1.12 (A) 0.6 - 1.1 cm    LVIDs 2.50 2.1 - 4.0 cm    FS 31 28 - 44 %    LA volume 67.61 cm3    Sinus 3.10 cm    STJ 1.94 cm    LV mass 120.63 g    LA size 3.90 cm    RVDD 3.71 cm    TAPSE 1.90 cm    Left Ventricle Relative Wall Thickness 0.62 cm    AV mean gradient 10 mmHg    AV valve area 2.05 cm2    AV Velocity Ratio 0.73     AV index (prosthetic) 0.69     MV mean gradient 4 mmHg    MV valve area by continuity eq 2.51 cm2    E/A ratio 0.58     Mean e' 0.05 m/s    E wave deceleration time 123.77 msec    IVRT 133.79 msec    LVOT diameter 1.94 cm    LVOT area 3.0 cm2    LVOT peak maira 1.48 m/s    LVOT peak VTI 20.40 cm    Ao peak maira 2.04 m/s    Ao VTI 29.40 cm    LVOT stroke volume 60.27 cm3    AV peak gradient 17 mmHg    MV peak gradient 9 mmHg    E/E' ratio 18.22 m/s    MV Peak E Maira 0.82 m/s    TR Max Maira 2.90 m/s    MV VTI 24.0 cm    MV Peak A Maira 1.42 m/s    LV Systolic Volume 22.25 mL    LV Systolic Volume Index 12.4 mL/m2    LV  Diastolic Volume 55.67 mL    LV Diastolic Volume Index 31.10 mL/m2    LA Volume Index 37.8 mL/m2    LV Mass Index 67 g/m2    RA Major Axis 4.69 cm    Left Atrium Minor Axis 4.93 cm    Left Atrium Major Axis 5.02 cm    Triscuspid Valve Regurgitation Peak Gradient 34 mmHg    RA Width 4.00 cm    Right Atrial Pressure (from IVC) 3 mmHg    EF 65 %    TV rest pulmonary artery pressure 37 mmHg    Narrative    · The left ventricle is normal in size with mild concentric hypertrophy   and normal systolic function.  · The estimated ejection fraction is 65%.  · Grade I left ventricular diastolic dysfunction.  · Normal right ventricular size with normal right ventricular systolic   function.  · The estimated PA systolic pressure is 37 mmHg.          PHILL:  No results found for this or any previous visit.    ASSESSMENT/PLAN:           Pre-op Assessment    I have reviewed the Patient Summary Reports.     I have reviewed the Nursing Notes. I have reviewed the NPO Status.   I have reviewed the Medications.     Review of Systems  Anesthesia Hx:  No problems with previous Anesthesia  History of prior surgery of interest to airway management or planning: Denies Family Hx of Anesthesia complications.   Denies Personal Hx of Anesthesia complications.   Social:  No Alcohol Use, Former Smoker    Hematology/Oncology:     Oncology Normal    -- Anemia:   EENT/Dental:EENT/Dental Normal   Cardiovascular:   Hypertension Denies CABG/stent.   Denies Angina. CHF PVD  Deep Venous Thrombosis (DVT), Hx of DVT    Pulmonary:  Pulmonary Normal  Denies COPD.  Denies Asthma.  Denies Shortness of breath.  Denies Recent URI.    Renal/:  Renal/ Normal     Hepatic/GI:   GERD    Musculoskeletal:  Musculoskeletal Normal    Neurological:  Neurology Normal  Denies CVA. Denies Seizures.    Endocrine:   Diabetes, type 2    Dermatological:   Lichen sclerosus, boil       Physical Exam  General: Well nourished, Cooperative, Alert and Oriented    Airway:  Mallampati:  III   Mouth Opening: Normal  TM Distance: Normal  Tongue: Normal  Neck ROM: Normal ROM    Dental:  Dentures, Edentulous        Anesthesia Plan  Type of Anesthesia, risks & benefits discussed:    Anesthesia Type: Gen ETT  Intra-op Monitoring Plan: Standard ASA Monitors and Art Line  Post Op Pain Control Plan: multimodal analgesia and IV/PO Opioids PRN  Induction:  IV  Airway Plan: Direct and Video, Post-Induction  Informed Consent: Informed consent signed with the Patient and all parties understand the risks and agree with anesthesia plan.  All questions answered.   ASA Score: 3  Day of Surgery Review of History & Physical: H&P Update referred to the surgeon/provider.    Ready For Surgery From Anesthesia Perspective.     .

## 2023-03-02 NOTE — ANESTHESIA PROCEDURE NOTES
Arterial    Diagnosis: PAD, T2DM    Patient location during procedure: done in OR  Procedure start time: 3/2/2023 12:13 PM  Timeout: 3/2/2023 12:12 PM  Procedure end time: 3/2/2023 12:15 PM    Staffing  Authorizing Provider: Brandon Dorantes MD  Performing Provider: Brandon Dorantes MD    Anesthesiologist was present at the time of the procedure.    Preanesthetic Checklist  Completed: patient identified, IV checked, site marked, risks and benefits discussed, surgical consent, monitors and equipment checked, pre-op evaluation, timeout performed and anesthesia consent givenArterial  Skin Prep: chlorhexidine gluconate  Local Infiltration: none  Orientation: left  Location: radial    Catheter Size: 20 G  Catheter placement by Ultrasound guidance. Heme positive aspiration all ports.   Vessel Caliber: small, patent, compressibility poor  Vascular Doppler:  not done  Needle advanced into vessel with real time Ultrasound guidance.Insertion Attempts: 2  Assessment  Dressing: secured with tape and tegaderm  Patient: Tolerated well

## 2023-03-02 NOTE — PROGRESS NOTES
[facility]  Podiatry  Consult Note    Patient Name: Nelsy Marino  MRN: [id number]  Admission Date: 2/27/2023  Hospital Length of Stay: 3 days  Attending Physician: Tanner Coello MD  Primary Care Provider: [facility]     Consults  Subjective:     History of Present Illness: Nelsy Marino is a 82 y.o. female with  has a past medical history of CHF (congestive heart failure), Diabetes mellitus, DVT (deep venous thrombosis), Hypertension, and Miscarriage. Consulted to podiatry for evaluation and treatment of right foot infection.   Location: forefoot Onset of the symptoms was several weeks ago. Precipitating event: Patient relates she was seen by a non [facility] podiatrist twice for an ingrown nail until she was able to have slant back procedure performed to the medial nail border. She relates that on Feb 7 the hallux began to change color.  Over time the other digits also began to shoe color change.  She denies trauma or procedure to other digits. She subsequently underwent an angiogram on 02/23 by vascular surgery, notes an occluded left-to-right fem-fem bypass and occluded external iliacs. Presented to ED after PCP was concerned with appearence of foot. In the ED, she was tachycardic (112), and hypertensive.  Labs showed microcytic anemia (10.2), elevated sed rate (112), elevated CRP (56.5), elevated lactic acid (2.5).  Foot x-ray showed DJD and spurs on the calcaneus with no acute process       On today's encounter she was in recliner with feet down.  Darco shoe to the right foot.  She relates improvement in pain when foot is down    3/1/23: Patient seen sitting in chair. Family present.     Chief Complaint   Patient presents with    Post-op Problem     Pt sent over by PCP for possible gangrene left toe. Pt states she had her left foot operated on 2 months ago and is now experiencing pain, swelling and noticed discoloration to extremity.           Scheduled Meds:   amLODIPine  10 mg Oral Daily     aspirin  81 mg Oral Daily    atorvastatin  40 mg Oral QHS    carvediloL  12.5 mg Oral BID    cilostazoL  50 mg Oral BID    hydroCHLOROthiazide  25 mg Oral Daily    insulin detemir U-100  20 Units Subcutaneous QHS    losartan  100 mg Oral Daily    piperacillin-tazobactam (ZOSYN) IVPB  4.5 g Intravenous Q8H    polyethylene glycol  17 g Oral Daily    vancomycin (VANCOCIN) IVPB  1,000 mg Intravenous Q24H     Continuous Infusions:   heparin (porcine) in D5W 12 Units/kg/hr (03/02/23 0131)     PRN Meds:acetaminophen, albuterol-ipratropium, bisacodyL, dextrose 10%, dextrose 10%, diphenhydrAMINE, glucagon (human recombinant), glucose, glucose, HYDROcodone-acetaminophen, insulin aspart U-100, melatonin, morphine, naloxone, ondansetron, prochlorperazine, simethicone, sodium chloride 0.9%, Pharmacy to dose Vancomycin consult **AND** vancomycin - pharmacy to dose    Review of patient's allergies indicates:   Allergen Reactions    Motrin [ibuprofen] Itching    Zosyn [piperacillin-tazobactam] Itching        Past Medical History:   Diagnosis Date    CHF (congestive heart failure)     Diabetes mellitus     DVT (deep venous thrombosis)     Hypertension     Miscarriage      Past Surgical History:   Procedure Laterality Date     femoral vascular surgery       ANGIOGRAPHY OF LOWER EXTREMITY Right 2/23/2023    Procedure: Angiogram Extremity Unilateral;  Surgeon: Danny Dunbar MD;  Location: [facility] OR;  Service: Vascular;  Laterality: Right;  RN PHONE PREOP 2/20/2023----CK CONSENT    CREATION OF ILIOFEMORAL ARTERY BYPASS Right 3/20/2019    Procedure: CREATION, BYPASS, ARTERIAL, ILIAC TO FEMORAL, RIGHT LOWER EXTREMITY, RIGHT PROFUNDAPLASTY;  Surgeon: Danny Dunbar MD;  Location: [facility] OR;  Service: Vascular;  Laterality: Right;  11:00AM START PER ANNE RICHARDS PREOP 3/13/2019  ACT'S, CELL SAVER, GRAFTS, BOOK WATER---NEED H/P  CONSENT IN AM------HGBA1C    HYSTERECTOMY      ?unsure cervix present    INCISION AND DRAINAGE Right  2019    Procedure: Incision and Drainage - R groin washout, possible muscle flap;  Surgeon: Danny Dunbar MD;  Location: [facility] OR 2ND FLR;  Service: Vascular;  Laterality: Right;  groin washout    INCISION AND DRAINAGE OF GROIN Right 5/3/2019    Procedure: Incision and Drainage R groin;  Surgeon: Danny Dunbar MD;  Location: [facility] OR 2ND FLR;  Service: Vascular;  Laterality: Right;       Family History       Problem Relation (Age of Onset)    Diabetes Father    Hypertension Father          Tobacco Use    Smoking status: Former     Types: Cigarettes     Quit date:      Years since quittin.1    Smokeless tobacco: Never   Substance and Sexual Activity    Alcohol use: No    Drug use: No    Sexual activity: Never     Review of Systems   Constitutional: Negative.    Respiratory: Negative.     Genitourinary: Negative.    Musculoskeletal:  Positive for arthralgias.   Skin:  Positive for color change.   Psychiatric/Behavioral: Negative.     Objective:     Vital Signs (Most Recent):  Temp: 97.8 °F (36.6 °C) (23 041)  Pulse: 83 (23 041)  Resp: 20 (23 0518)  BP: (!) 148/55 (23 041)  SpO2: (!) 93 % (23)   Vital Signs (24h Range):  Temp:  [97.5 °F (36.4 °C)-98.7 °F (37.1 °C)] 97.8 °F (36.6 °C)  Pulse:  [80-93] 83  Resp:  [18-20] 20  SpO2:  [92 %-96 %] 93 %  BP: (118-148)/(55-63) 148/55     Weight: 73.9 kg (163 lb)  Body mass index is 27.98 kg/m².    Foot Exam    General  General Appearance: appears stated age and healthy   Orientation: alert and oriented to person, place, and time   Affect: appropriate       Right Foot/Ankle     Inspection and Palpation  Tenderness: (forefoot)  Swelling: (forefoot)  Hammertoes: second toe, third toe, fourth toe and fifth toe  Hallux valgus: no  Hallux limitus: yes  Skin Exam: drainage, maceration, ulcer and erythema;     Neurovascular  Dorsalis pedis: absent  Posterior tibial: absent        3/1/23:        Right  forefoot  2/28/2023          Laboratory:  A1C:   Recent Labs   Lab 02/28/23  0549   HGBA1C 8.2*       CBC:   Recent Labs   Lab 03/01/23  0938   WBC 8.45   RBC 3.86*   HGB 9.3*   HCT 30.3*      MCV 79*   MCH 24.1*   MCHC 30.7*       CMP:   Recent Labs   Lab 02/28/23  0549 03/01/23  0606   GLU 49* 112*   CALCIUM 9.4 9.3   ALBUMIN 2.9*  --    PROT 8.0  --     136   K 4.3 4.3   CO2 25 24    101   BUN 19 26*   CREATININE 1.3 1.6*   ALKPHOS 61  --    ALT 11  --    AST 22  --    BILITOT 0.7  --        CRP:   Recent Labs   Lab 02/27/23  1427   CRP 56.5*       ESR:   Recent Labs   Lab 02/27/23  1427   SEDRATE 112*       Microbiology Results (last 7 days)       Procedure Component Value Units Date/Time    Blood culture #2 **CANNOT BE ORDERED STAT** [[id number]] Collected: 02/27/23 1923    Order Status: Completed Specimen: Blood from Peripheral, Antecubital, Right Updated: 03/01/23 2103     Blood Culture, Routine No Growth to date      No Growth to date      No Growth to date    Blood culture #1 **CANNOT BE ORDERED STAT** [[id number]] Collected: 02/27/23 1428    Order Status: Completed Specimen: Blood from Peripheral, Antecubital, Left Updated: 03/01/23 1503     Blood Culture, Routine No Growth to date      No Growth to date      No Growth to date            Diagnostic Results:  Imaging Results              X-Ray Foot Complete Right (Final result)  Result time 02/27/23 14:01:22   Procedure changed from X-Ray Foot Complete Left     Final result by Stefano Quinteros III, MD (02/27/23 14:01:22)                   Narrative:    EXAMINATION:  XR FOOT COMPLETE 3 VIEW RIGHT    CLINICAL HISTORY:  wound infection;  Other injury of unspecified body region, initial encounter    FINDINGS:  Three views right foot: No fracture, dislocation, bone destruction, or foreign body seen.  There is DJD and spurs on the calcaneus.      Electronically signed by: Stefano Quinteros MD  Date:    02/27/2023  Time:    14:01                                  MRI Foot (Forefoot) Right Without Contrast  Order: [id number]  Status: Final result     Visible to patient: Yes (not seen)     Next appt: 03/03/2023 at 10:30 AM in Cardiology (VASCULAR ULTRASOUND, [facility] [place])     Dx: Diabetic foot infection; Wet gangrene...     0 Result Notes  Details    Reading Physician Reading Date Result Priority   Kashmir Benitez MD  [phone]  [phone] 2/28/2023 Routine     Narrative & Impression  EXAMINATION:  MRI FOOT (FOREFOOT) RIGHT WITHOUT CONTRAST     CLINICAL HISTORY:  Foot swelling, diabetic, osteomyelitis suspected, xray done;  Type 2 diabetes mellitus with other skin complications     TECHNIQUE:  Multiplanar, multisequence MR imaging of the right forefoot without the use of intravenous gadolinium IV contrast.     COMPARISON:  Right foot radiograph dated 02/27/2023     FINDINGS:  Motion limitation across sequences which could obscure subtle abnormality, particularly at the level of the phalanges.     There is STIR edematous change at the 5th toe phalanges without corresponding discrete T1 marrow hypointensity.  Remaining osseous T1 marrow signal of the forefoot appears maintained.  Nonspecific STIR hyperintensity with mild atrophic change of intrinsic foot musculature.  No significant peritendinous fluid of the visualized tendons.  No discrete drainable collection.     Impression:     Motion limited exam.  Nonspecific marrow edematous change at the 5th toe phalanges without discrete T1 marrow signal replacement.  Findings which could relate to sequela reactive periostitis or early osteomyelitis (particularly if in the presence of nearby wound).  To correlate with physical exam.     No drainable soft tissue collection.        MRI:   Assessment/Plan:     Active Diagnoses:    Diagnosis Date Noted POA    PRINCIPAL PROBLEM:  Critical limb ischemia with history of revascularization of same extremity [I70.229, Z98.890] 02/27/2023 Not Applicable    Hypoglycemia [E16.2]  02/28/2023 No    Advanced care planning/counseling discussion [Z71.89] 02/28/2023 Not Applicable    Preoperative clearance [Z01.818] 02/28/2023 Not Applicable    Sepsis [A41.9] 02/27/2023 Yes    PVD (peripheral vascular disease) [I73.9] 03/20/2019 Yes     Chronic    History of DVT (deep vein thrombosis) [Z86.718] 03/20/2019 Not Applicable     Chronic    Essential hypertension [I10] 03/20/2019 Yes     Chronic    Type 2 diabetes mellitus [E11.9] 03/20/2019 Yes     Chronic      Problems Resolved During this Admission:       Discussed wound healing cycle, skin integrity, ways to care for skin.Counseled patient on the effects of PAOD and high blood glucose on healing. They verbalizes understanding that it can increase the chances of delayed healing and this prolonged exposure leads to infection or progression of infection which subsequently can result in loss of limb.    Personally reviewed foot xrays.  No gas nor bone infection noted.  MRI revealing possible early OM right 5th toe.   Nursing orders placed for qshift betadine paint to forefoot and interdigitally, offloading boots for bed    Debridement:  deferred    Plan for local wound care until amputation level can be determined     Thank you for your consult. I will follow-up with patient. Please contact us if you have any additional questions.    Lou Kirkland DPM  Podiatry  [facility] [place] - Med Surg

## 2023-03-02 NOTE — HPI
Nelsy Marino is a 82 y.o. lady with severe PAD, hypertension, type 2 diabetes, history of DVT who presents with right foot pain. Patient reports worsening right foot pain, swelling and discoloration that started after she had an ingrown toenail removed by her podiatrist. She reports that her discoloration started since 2/7. Of note, the patient underwent an angiogram on 02/23 by vascular surgery. She was transferred to [facility] for vascular bypass. Currently, plan is to perform a left fem-rigth SFA redo vs axillary-profunda bypass.

## 2023-03-02 NOTE — ASSESSMENT & PLAN NOTE
-Cardiology deemed acceptable risk for LE bypass  -To hybrid OR surgery 3/2/23  -Cont Heparin drip - stop 6 hrs preop  -NPO at midnight  -Pain control prn and wound care   -May need R TMA

## 2023-03-02 NOTE — CONSULTS
Pharmacokinetic Assessment Follow Up: IV Vancomycin    Vancomycin serum concentration assessment(s):    The trough level was drawn correctly and can be used to guide therapy at this time. The measurement is within the desired definitive target range of 10 to 20 mcg/mL.    Vancomycin Regimen Plan:    Continue regimen to Vancomycin 1000 mg IV every 24 hours with next serum trough concentration measured at 0130 prior to next dose on 3/4/23    Drug levels (last 3 results):  Recent Labs   Lab Result Units 03/01/23  2051   Vancomycin, Random ug/mL 10.1       Pharmacy will continue to follow and monitor vancomycin.    Please contact pharmacy at extension [phone] for questions regarding this assessment.    Thank you for the consult,   Galina Ponce, PharmD       Patient brief summary:  Nelsy Marino is a 82 y.o. female initiated on antimicrobial therapy with IV Vancomycin for treatment of skin & soft tissue infection    Drug Allergies:   Review of patient's allergies indicates:   Allergen Reactions    Motrin [ibuprofen] Itching    Zosyn [piperacillin-tazobactam] Itching       Actual Body Weight:   74 kg    Renal Function:   Estimated Creatinine Clearance: 35.6 mL/min (based on SCr of 1.2 mg/dL).,     Dialysis Method (if applicable):  N/A    CBC (last 72 hours):  Recent Labs   Lab Result Units 02/27/23  1427 02/28/23  0549 03/01/23  0938 03/02/23  0817   WBC K/uL 10.57 10.99 8.45 7.74   Hemoglobin g/dL 10.2* 10.3* 9.3* 9.5*   Hemoglobin A1C %  --  8.2*  --   --    Hematocrit % 33.4* 33.5* 30.3* 31.5*   Platelets K/uL 397 406 381 363   Gran % % 76.8* 76.3* 73.5* 77.0*   Lymph % % 13.7* 13.2* 15.9* 14.1*   Mono % % 6.0 7.4 6.5 5.7   Eosinophil % % 2.9 2.3 3.7 2.7   Basophil % % 0.2 0.4 0.2 0.1   Differential Method  Automated Automated Automated Automated       Metabolic Panel (last 72 hours):  Recent Labs   Lab Result Units 02/27/23  1427 02/28/23  0549 03/01/23  0606 03/02/23  0817   Sodium mmol/L 139 139 136 132*   Potassium  mmol/L 4.6 4.3 4.3 4.2   Chloride mmol/L 105 102 101 100   CO2 mmol/L 25 25 24 25   Glucose mg/dL 138* 49* 112* 241*   BUN mg/dL 18 19 26* 22   Creatinine mg/dL 1.2 1.3 1.6* 1.2   Albumin g/dL 2.9* 2.9*  --   --    Total Bilirubin mg/dL 0.4 0.7  --   --    Alkaline Phosphatase U/L 66 61  --   --    AST U/L 22 22  --   --    ALT U/L 9* 11  --   --    Magnesium mg/dL  --  2.2 2.3 2.1   Phosphorus mg/dL  --  4.7* 4.1 2.8       Vancomycin Administrations:  vancomycin given in the last 96 hours                     vancomycin (VANCOCIN) 1,000 mg in dextrose 5 % (D5W) 250 mL IVPB (Vial-Mate) (mg) 1,000 mg New Bag 03/02/23 0220     1,000 mg New Bag 02/28/23 2113    vancomycin 1.5 g in dextrose 5 % 250 mL IVPB (ready to mix) (mg) 1,500 mg New Bag 02/27/23 2148                    Microbiologic Results:  Microbiology Results (last 7 days)       Procedure Component Value Units Date/Time    Blood culture #2 **CANNOT BE ORDERED STAT** [[id number]] Collected: 02/27/23 1923    Order Status: Completed Specimen: Blood from Peripheral, Antecubital, Right Updated: 03/01/23 2103     Blood Culture, Routine No Growth to date      No Growth to date      No Growth to date    Blood culture #1 **CANNOT BE ORDERED STAT** [[id number]] Collected: 02/27/23 1428    Order Status: Completed Specimen: Blood from Peripheral, Antecubital, Left Updated: 03/01/23 1503     Blood Culture, Routine No Growth to date      No Growth to date      No Growth to date

## 2023-03-02 NOTE — ANESTHESIA PROCEDURE NOTES
Intubation    Date/Time: 3/2/2023 11:56 AM  Performed by: Tracy Spears CRNA  Authorized by: Brandon Dorantes MD     Intubation:     Induction:  Intravenous    Intubated:  Postinduction    Mask Ventilation:  Easy with oral airway    Attempts:  1    Attempted By:  CRNA    Method of Intubation:  Direct    Blade:  Mendez 2    Laryngeal View Grade: Grade I - full view of cords      Difficult Airway Encountered?: No      Complications:  None    Airway Device:  Oral endotracheal tube    Airway Device Size:  7.5    Style/Cuff Inflation:  Cuffed    Inflation Amount (mL):  6    Tube secured:  22    Secured at:  The lips    Placement Verified By:  Capnometry    Complicating Factors:  None    Findings Post-Intubation:  BS equal bilateral

## 2023-03-02 NOTE — PLAN OF CARE
Problem: Adult Inpatient Plan of Care  Goal: Plan of Care Review  Outcome: Ongoing, Progressing  Flowsheets (Taken 3/2/2023 1037)  Plan of Care Reviewed With:   patient   spouse   family  Goal: Absence of Hospital-Acquired Illness or Injury  Outcome: Ongoing, Progressing  Goal: Optimal Comfort and Wellbeing  Outcome: Ongoing, Progressing     Problem: Fall Injury Risk  Goal: Absence of Fall and Fall-Related Injury  Outcome: Ongoing, Progressing     Problem: Skin Injury Risk Increased  Goal: Skin Health and Integrity  Outcome: Ongoing, Progressing

## 2023-03-02 NOTE — SUBJECTIVE & OBJECTIVE
"Medications Prior to Admission   Medication Sig Dispense Refill Last Dose    amlodipine (NORVASC) 10 MG tablet Take 10 mg by mouth once daily.       aspirin (ECOTRIN) 81 MG EC tablet Take 81 mg by mouth once daily.       BLOOD SUGAR DIAGNOSTIC (TRUE METRIX GLUCOSE TEST STRIP MISC) by Misc.(Non-Drug; Combo Route) route.       carvediloL (COREG) 12.5 MG tablet Take 12.5 mg by mouth 2 (two) times daily.       cilostazol (PLETAL) 50 MG Tab Take 1 tablet (50 mg total) by mouth 2 (two) times daily. If patient tolerate medication after 4 weeks, increase dose to 100 mg twice daily. 60 tablet 11     glipiZIDE (GLUCOTROL) 10 MG tablet Take 10 mg by mouth 2 (two) times daily before meals.       insulin aspart protamine-insulin aspart (NOVOLOG 70/30) 100 unit/mL (70-30) InPn pen Inject 50 Units into the skin every morning.       insulin aspart protamine-insulin aspart (NOVOLOG 70/30) 100 unit/mL (70-30) InPn pen Inject 50 Units into the skin every evening.       insulin syringe-needle,dispos. 0.3 mL 30 gauge x 5/16" Syrg by Misc.(Non-Drug; Combo Route) route.       losartan-hydrochlorothiazide 100-25 mg (HYZAAR) 100-25 mg per tablet Take 1 tablet by mouth once daily.       metformin (GLUCOPHAGE) 1000 MG tablet Take 1,000 mg by mouth 2 (two) times daily with meals.       ondansetron (ZOFRAN) 8 MG tablet   0        Review of patient's allergies indicates:   Allergen Reactions    Motrin [ibuprofen] Itching    Zosyn [piperacillin-tazobactam] Itching       Past Medical History:   Diagnosis Date    CHF (congestive heart failure)     Diabetes mellitus     DVT (deep venous thrombosis)     Hypertension     Miscarriage      Past Surgical History:   Procedure Laterality Date     femoral vascular surgery       ANGIOGRAPHY OF LOWER EXTREMITY Right 2/23/2023    Procedure: Angiogram Extremity Unilateral;  Surgeon: Danny Dunbar MD;  Location: [facility];  Service: Vascular;  Laterality: Right;  RN PHONE PREOP 2/20/2023----CK CONSENT    " CREATION OF ILIOFEMORAL ARTERY BYPASS Right 3/20/2019    Procedure: CREATION, BYPASS, ARTERIAL, ILIAC TO FEMORAL, RIGHT LOWER EXTREMITY, RIGHT PROFUNDAPLASTY;  Surgeon: Danny Dunbar MD;  Location: [facility] OR;  Service: Vascular;  Laterality: Right;  11:00AM START PER ANNE  RN PREOP 3/13/2019  ACT'S, CELL SAVER, GRAFTS, BOOK WATER---NEED H/P  CONSENT IN AM------HGBA1C    HYSTERECTOMY      ?unsure cervix present    INCISION AND DRAINAGE Right 2019    Procedure: Incision and Drainage - R groin washout, possible muscle flap;  Surgeon: Danny Dunbar MD;  Location: [facility] OR [facility] FLR;  Service: Vascular;  Laterality: Right;  groin washout    INCISION AND DRAINAGE OF GROIN Right 5/3/2019    Procedure: Incision and Drainage R groin;  Surgeon: Danny Dunbar MD;  [facility];  Service: Vascular;  Laterality: Right;     Family History       Problem Relation (Age of Onset)    Diabetes Father    Hypertension Father          Tobacco Use    Smoking status: Former     Types: Cigarettes     Quit date: 1980     Years since quittin.1    Smokeless tobacco: Never   Substance and Sexual Activity    Alcohol use: No    Drug use: No    Sexual activity: Never     Review of Systems   Constitutional:  Negative for chills and fever.   HENT:  Negative for hearing loss and trouble swallowing.    Eyes:  Negative for discharge and visual disturbance.   Respiratory:  Negative for chest tightness and shortness of breath.    Cardiovascular:  Negative for chest pain and palpitations.   Gastrointestinal:  Negative for abdominal distention, abdominal pain, nausea and vomiting.   Genitourinary:  Negative for difficulty urinating and hematuria.   Musculoskeletal:  Negative for arthralgias and back pain.   Skin:  Positive for color change and wound. Negative for rash.   Neurological:  Negative for dizziness and headaches.   Objective:     Vital Signs (Most Recent):  Temp: 99.1 °F (37.3 °C) (23 0714)  Pulse:  82 (03/02/23 0714)  Resp: 18 (03/02/23 0714)  BP: (!) 132/59 (03/02/23 0714)  SpO2: 96 % (03/02/23 0714)   Vital Signs (24h Range):  Temp:  [97.5 °F (36.4 °C)-99.1 °F (37.3 °C)] 99.1 °F (37.3 °C)  Pulse:  [80-93] 82  Resp:  [18-20] 18  SpO2:  [92 %-96 %] 96 %  BP: (118-148)/(55-63) 132/59     Weight: 74 kg (163 lb 2.3 oz)  Body mass index is 28 kg/m².    Physical Exam  Constitutional:       General: She is not in acute distress.     Appearance: Normal appearance. She is normal weight. She is not ill-appearing.   Cardiovascular:      Rate and Rhythm: Normal rate and regular rhythm.      Pulses:           Femoral pulses are 0 on the right side and 0 on the left side.       Dorsalis pedis pulses are detected w/ Doppler on the right side and detected w/ Doppler on the left side.        Posterior tibial pulses are detected w/ Doppler on the right side and detected w/ Doppler on the left side.      Heart sounds: Normal heart sounds.   Musculoskeletal:      Comments: R toe dry gangrene   Skin:     General: Skin is warm and dry.   Neurological:      General: No focal deficit present.      Mental Status: She is alert and oriented to person, place, and time.   Psychiatric:         Mood and Affect: Mood normal.       Significant Labs:  CBC:   Recent Labs   Lab 03/01/23  0938   WBC 8.45   RBC 3.86*   HGB 9.3*   HCT 30.3*      MCV 79*   MCH 24.1*   MCHC 30.7*     CMP:   Recent Labs   Lab 03/01/23  0606   *   CALCIUM 9.3      K 4.3   CO2 24      BUN 26*   CREATININE 1.6*       Significant Diagnostics:  I have reviewed and interpreted all pertinent imaging results/findings within the past 24 hours.

## 2023-03-02 NOTE — SUBJECTIVE & OBJECTIVE
Medications:  Continuous Infusions:   heparin (porcine) in D5W Stopped (03/02/23 0636)     Scheduled Meds:   amLODIPine  10 mg Oral Daily    aspirin  81 mg Oral Daily    atorvastatin  40 mg Oral QHS    carvediloL  12.5 mg Oral BID    cilostazoL  50 mg Oral BID    hydroCHLOROthiazide  25 mg Oral Daily    insulin detemir U-100  20 Units Subcutaneous QHS    losartan  100 mg Oral Daily    piperacillin-tazobactam (ZOSYN) IVPB  4.5 g Intravenous Q8H    polyethylene glycol  17 g Oral Daily    vancomycin (VANCOCIN) IVPB  1,000 mg Intravenous Q24H     PRN Meds:acetaminophen, albuterol-ipratropium, bisacodyL, dextrose 10%, dextrose 10%, diphenhydrAMINE, glucagon (human recombinant), glucose, glucose, HYDROcodone-acetaminophen, insulin aspart U-100, melatonin, morphine, naloxone, ondansetron, prochlorperazine, simethicone, sodium chloride 0.9%, Pharmacy to dose Vancomycin consult **AND** vancomycin - pharmacy to dose     Objective:     Vital Signs (Most Recent):  Temp: 97.8 °F (36.6 °C) (03/02/23 0419)  Pulse: 83 (03/02/23 0419)  Resp: 20 (03/02/23 0518)  BP: (!) 148/55 (03/02/23 0419)  SpO2: (!) 93 % (03/02/23 0419)   Vital Signs (24h Range):  Temp:  [97.5 °F (36.4 °C)-98.7 °F (37.1 °C)] 97.8 °F (36.6 °C)  Pulse:  [80-93] 83  Resp:  [18-20] 20  SpO2:  [92 %-96 %] 93 %  BP: (118-148)/(55-63) 148/55         Physical Exam  Constitutional:       General: She is not in acute distress.     Appearance: Normal appearance. She is normal weight. She is not ill-appearing.   Cardiovascular:      Rate and Rhythm: Normal rate and regular rhythm.      Pulses:           Femoral pulses are 0 on the right side and 0 on the left side.       Dorsalis pedis pulses are detected w/ Doppler on the right side and detected w/ Doppler on the left side.        Posterior tibial pulses are detected w/ Doppler on the right side and detected w/ Doppler on the left side.      Heart sounds: Normal heart sounds.   Musculoskeletal:      Comments: R toe dry  gangrene   Skin:     General: Skin is warm and dry.   Neurological:      General: No focal deficit present.      Mental Status: She is alert and oriented to person, place, and time.   Psychiatric:         Mood and Affect: Mood normal.       Significant Labs:  CBC:   Recent Labs   Lab 03/01/23  0938   WBC 8.45   RBC 3.86*   HGB 9.3*   HCT 30.3*      MCV 79*   MCH 24.1*   MCHC 30.7*     CMP:   Recent Labs   Lab 03/01/23  0606   *   CALCIUM 9.3      K 4.3   CO2 24      BUN 26*   CREATININE 1.6*       Significant Diagnostics:  I have reviewed all pertinent imaging results/findings within the past 24 hours.

## 2023-03-02 NOTE — NURSING
Pt arrived from [facility] facility via EMS; family at bedside; pt A/ox4; right foot appear necrotic; Heparin gtt on hold: VSS; no signs of distress; MD notified that patient arrived to room 1048 with Heparin gtt on hold; last aptt done at [facility] at 1851 was 38.1; RN inquired if MD wanted gtt to be started at previous dose or titrated according to protocol; per MD, continue to hold gtt until we [place] an [place] [facility]; will continue to monitor.

## 2023-03-02 NOTE — PLAN OF CARE
Problem: Adult Inpatient Plan of Care  Goal: Plan of Care Review  Outcome: Ongoing, Progressing  Goal: Patient-Specific Goal (Individualized)  Outcome: Ongoing, Progressing  Goal: Absence of Hospital-Acquired Illness or Injury  Outcome: Ongoing, Progressing  Goal: Optimal Comfort and Wellbeing  Outcome: Ongoing, Progressing  Goal: Readiness for Transition of Care  Outcome: Ongoing, Progressing     Problem: Diabetes Comorbidity  Goal: Blood Glucose Level Within Targeted Range  Outcome: Ongoing, Progressing     Problem: Adjustment to Illness (Sepsis/Septic Shock)  Goal: Optimal Coping  Outcome: Ongoing, Progressing     Problem: Bleeding (Sepsis/Septic Shock)  Goal: Absence of Bleeding  Outcome: Ongoing, Progressing     Problem: Glycemic Control Impaired (Sepsis/Septic Shock)  Goal: Blood Glucose Level Within Desired Range  Outcome: Ongoing, Progressing     POC reviewed and discussed; pt A/Ox4; verbalizes understanding; c/o intermittent RLE foot pain; pain management provided; Heparin gtt infusing; blood glucose monitoring and management discussed; NPO since MN except sips with med; VSS; no signs of distress; will continue to monitor.

## 2023-03-03 ENCOUNTER — ANESTHESIA EVENT (OUTPATIENT)
Dept: SURGERY | Facility: HOSPITAL | Age: 83
DRG: 853 | End: 2023-03-03
Payer: MEDICARE

## 2023-03-03 PROBLEM — E87.1 HYPONATREMIA: Status: ACTIVE | Noted: 2023-03-03

## 2023-03-03 LAB
ALBUMIN SERPL BCP-MCNC: 2.4 G/DL (ref 3.5–5.2)
ALP SERPL-CCNC: 50 U/L (ref 55–135)
ALT SERPL W/O P-5'-P-CCNC: 15 U/L (ref 10–44)
ANION GAP SERPL CALC-SCNC: 7 MMOL/L (ref 8–16)
APTT BLDCRRT: 29.1 SEC (ref 21–32)
APTT BLDCRRT: 29.1 SEC (ref 21–32)
APTT BLDCRRT: 44.5 SEC (ref 21–32)
APTT BLDCRRT: 46.9 SEC (ref 21–32)
AST SERPL-CCNC: 32 U/L (ref 10–40)
BACTERIA BLD CULT: NORMAL
BACTERIA BLD CULT: NORMAL
BASOPHILS # BLD AUTO: 0.01 K/UL (ref 0–0.2)
BASOPHILS # BLD AUTO: 0.01 K/UL (ref 0–0.2)
BASOPHILS NFR BLD: 0.1 % (ref 0–1.9)
BASOPHILS NFR BLD: 0.1 % (ref 0–1.9)
BILIRUB SERPL-MCNC: 2 MG/DL (ref 0.1–1)
BUN SERPL-MCNC: 16 MG/DL (ref 8–23)
CALCIUM SERPL-MCNC: 8.4 MG/DL (ref 8.7–10.5)
CHLORIDE SERPL-SCNC: 103 MMOL/L (ref 95–110)
CO2 SERPL-SCNC: 24 MMOL/L (ref 23–29)
CREAT SERPL-MCNC: 1 MG/DL (ref 0.5–1.4)
DIFFERENTIAL METHOD: ABNORMAL
DIFFERENTIAL METHOD: ABNORMAL
EOSINOPHIL # BLD AUTO: 0.1 K/UL (ref 0–0.5)
EOSINOPHIL # BLD AUTO: 0.1 K/UL (ref 0–0.5)
EOSINOPHIL NFR BLD: 1 % (ref 0–8)
EOSINOPHIL NFR BLD: 1 % (ref 0–8)
ERYTHROCYTE [DISTWIDTH] IN BLOOD BY AUTOMATED COUNT: 14.2 % (ref 11.5–14.5)
ERYTHROCYTE [DISTWIDTH] IN BLOOD BY AUTOMATED COUNT: 14.2 % (ref 11.5–14.5)
EST. GFR  (NO RACE VARIABLE): 56.2 ML/MIN/1.73 M^2
GLUCOSE SERPL-MCNC: 155 MG/DL (ref 70–110)
GLUCOSE SERPL-MCNC: 159 MG/DL (ref 70–110)
GLUCOSE SERPL-MCNC: 225 MG/DL (ref 70–110)
GLUCOSE SERPL-MCNC: 252 MG/DL (ref 70–110)
HCO3 UR-SCNC: 23.9 MMOL/L (ref 24–28)
HCO3 UR-SCNC: 24 MMOL/L (ref 24–28)
HCO3 UR-SCNC: 24 MMOL/L (ref 24–28)
HCT VFR BLD AUTO: 28.3 % (ref 37–48.5)
HCT VFR BLD AUTO: 28.3 % (ref 37–48.5)
HCT VFR BLD CALC: 20 %PCV (ref 36–54)
HCT VFR BLD CALC: 26 %PCV (ref 36–54)
HCT VFR BLD CALC: 26 %PCV (ref 36–54)
HGB BLD-MCNC: 8.9 G/DL (ref 12–16)
HGB BLD-MCNC: 8.9 G/DL (ref 12–16)
IMM GRANULOCYTES # BLD AUTO: 0.03 K/UL (ref 0–0.04)
IMM GRANULOCYTES # BLD AUTO: 0.03 K/UL (ref 0–0.04)
IMM GRANULOCYTES NFR BLD AUTO: 0.3 % (ref 0–0.5)
IMM GRANULOCYTES NFR BLD AUTO: 0.3 % (ref 0–0.5)
LYMPHOCYTES # BLD AUTO: 0.8 K/UL (ref 1–4.8)
LYMPHOCYTES # BLD AUTO: 0.8 K/UL (ref 1–4.8)
LYMPHOCYTES NFR BLD: 8.3 % (ref 18–48)
LYMPHOCYTES NFR BLD: 8.3 % (ref 18–48)
MCH RBC QN AUTO: 25.5 PG (ref 27–31)
MCH RBC QN AUTO: 25.5 PG (ref 27–31)
MCHC RBC AUTO-ENTMCNC: 31.4 G/DL (ref 32–36)
MCHC RBC AUTO-ENTMCNC: 31.4 G/DL (ref 32–36)
MCV RBC AUTO: 81 FL (ref 82–98)
MCV RBC AUTO: 81 FL (ref 82–98)
MONOCYTES # BLD AUTO: 0.6 K/UL (ref 0.3–1)
MONOCYTES # BLD AUTO: 0.6 K/UL (ref 0.3–1)
MONOCYTES NFR BLD: 5.8 % (ref 4–15)
MONOCYTES NFR BLD: 5.8 % (ref 4–15)
NEUTROPHILS # BLD AUTO: 8.6 K/UL (ref 1.8–7.7)
NEUTROPHILS # BLD AUTO: 8.6 K/UL (ref 1.8–7.7)
NEUTROPHILS NFR BLD: 84.5 % (ref 38–73)
NEUTROPHILS NFR BLD: 84.5 % (ref 38–73)
NRBC BLD-RTO: 0 /100 WBC
NRBC BLD-RTO: 0 /100 WBC
PCO2 BLDA: 35.4 MMHG (ref 35–45)
PCO2 BLDA: 37.2 MMHG (ref 35–45)
PCO2 BLDA: 42.7 MMHG (ref 35–45)
PH SMN: 7.36 [PH] (ref 7.35–7.45)
PH SMN: 7.42 [PH] (ref 7.35–7.45)
PH SMN: 7.44 [PH] (ref 7.35–7.45)
PLATELET # BLD AUTO: 284 K/UL (ref 150–450)
PLATELET # BLD AUTO: 284 K/UL (ref 150–450)
PMV BLD AUTO: 10.2 FL (ref 9.2–12.9)
PMV BLD AUTO: 10.2 FL (ref 9.2–12.9)
PO2 BLDA: 106 MMHG (ref 80–100)
PO2 BLDA: 115 MMHG (ref 80–100)
PO2 BLDA: 89 MMHG (ref 80–100)
POC ACTIVATED CLOTTING TIME K: 155 SEC (ref 74–137)
POC ACTIVATED CLOTTING TIME K: 245 SEC (ref 74–137)
POC ACTIVATED CLOTTING TIME K: 245 SEC (ref 74–137)
POC ACTIVATED CLOTTING TIME K: 269 SEC (ref 74–137)
POC ACTIVATED CLOTTING TIME K: 275 SEC (ref 74–137)
POC ACTIVATED CLOTTING TIME K: 281 SEC (ref 74–137)
POC ACTIVATED CLOTTING TIME K: 311 SEC (ref 74–137)
POC BE: -1 MMOL/L
POC BE: -2 MMOL/L
POC BE: 0 MMOL/L
POC IONIZED CALCIUM: 1.07 MMOL/L (ref 1.06–1.42)
POC IONIZED CALCIUM: 1.14 MMOL/L (ref 1.06–1.42)
POC IONIZED CALCIUM: 1.15 MMOL/L (ref 1.06–1.42)
POC SATURATED O2: 97 % (ref 95–100)
POC SATURATED O2: 98 % (ref 95–100)
POC SATURATED O2: 99 % (ref 95–100)
POC TCO2: 25 MMOL/L (ref 23–27)
POCT GLUCOSE: 207 MG/DL (ref 70–110)
POCT GLUCOSE: 228 MG/DL (ref 70–110)
POCT GLUCOSE: 240 MG/DL (ref 70–110)
POCT GLUCOSE: 240 MG/DL (ref 70–110)
POTASSIUM BLD-SCNC: 3.6 MMOL/L (ref 3.5–5.1)
POTASSIUM BLD-SCNC: 3.7 MMOL/L (ref 3.5–5.1)
POTASSIUM BLD-SCNC: 3.9 MMOL/L (ref 3.5–5.1)
POTASSIUM SERPL-SCNC: 4.1 MMOL/L (ref 3.5–5.1)
PROT SERPL-MCNC: 6.1 G/DL (ref 6–8.4)
RBC # BLD AUTO: 3.49 M/UL (ref 4–5.4)
RBC # BLD AUTO: 3.49 M/UL (ref 4–5.4)
SAMPLE: ABNORMAL
SODIUM BLD-SCNC: 137 MMOL/L (ref 136–145)
SODIUM SERPL-SCNC: 134 MMOL/L (ref 136–145)
WBC # BLD AUTO: 10.18 K/UL (ref 3.9–12.7)
WBC # BLD AUTO: 10.18 K/UL (ref 3.9–12.7)

## 2023-03-03 PROCEDURE — 25000003 PHARM REV CODE 250

## 2023-03-03 PROCEDURE — 63600175 PHARM REV CODE 636 W HCPCS: Performed by: STUDENT IN AN ORGANIZED HEALTH CARE EDUCATION/TRAINING PROGRAM

## 2023-03-03 PROCEDURE — 25000003 PHARM REV CODE 250: Performed by: INTERNAL MEDICINE

## 2023-03-03 PROCEDURE — 99024 PR POST-OP FOLLOW-UP VISIT: ICD-10-PCS | Mod: ,,, | Performed by: SURGERY

## 2023-03-03 PROCEDURE — 51701 INSERT BLADDER CATHETER: CPT

## 2023-03-03 PROCEDURE — 25000003 PHARM REV CODE 250: Performed by: STUDENT IN AN ORGANIZED HEALTH CARE EDUCATION/TRAINING PROGRAM

## 2023-03-03 PROCEDURE — 63600175 PHARM REV CODE 636 W HCPCS

## 2023-03-03 PROCEDURE — 99291 CRITICAL CARE FIRST HOUR: CPT | Mod: ,,, | Performed by: ANESTHESIOLOGY

## 2023-03-03 PROCEDURE — 27000221 HC OXYGEN, UP TO 24 HOURS

## 2023-03-03 PROCEDURE — 80053 COMPREHEN METABOLIC PANEL: CPT | Performed by: STUDENT IN AN ORGANIZED HEALTH CARE EDUCATION/TRAINING PROGRAM

## 2023-03-03 PROCEDURE — 99900035 HC TECH TIME PER 15 MIN (STAT)

## 2023-03-03 PROCEDURE — 99024 POSTOP FOLLOW-UP VISIT: CPT | Mod: ,,, | Performed by: SURGERY

## 2023-03-03 PROCEDURE — 85730 THROMBOPLASTIN TIME PARTIAL: CPT | Performed by: STUDENT IN AN ORGANIZED HEALTH CARE EDUCATION/TRAINING PROGRAM

## 2023-03-03 PROCEDURE — 85730 THROMBOPLASTIN TIME PARTIAL: CPT | Mod: 91 | Performed by: SURGERY

## 2023-03-03 PROCEDURE — 25000003 PHARM REV CODE 250: Performed by: EMERGENCY MEDICINE

## 2023-03-03 PROCEDURE — 99233 PR SUBSEQUENT HOSPITAL CARE,LEVL III: ICD-10-PCS | Mod: ,,, | Performed by: PODIATRIST

## 2023-03-03 PROCEDURE — 63600175 PHARM REV CODE 636 W HCPCS: Performed by: HOSPITALIST

## 2023-03-03 PROCEDURE — 85025 COMPLETE CBC W/AUTO DIFF WBC: CPT | Performed by: STUDENT IN AN ORGANIZED HEALTH CARE EDUCATION/TRAINING PROGRAM

## 2023-03-03 PROCEDURE — 99291 PR CRITICAL CARE, E/M 30-74 MINUTES: ICD-10-PCS | Mod: ,,, | Performed by: ANESTHESIOLOGY

## 2023-03-03 PROCEDURE — 63600175 PHARM REV CODE 636 W HCPCS: Performed by: EMERGENCY MEDICINE

## 2023-03-03 PROCEDURE — 51798 US URINE CAPACITY MEASURE: CPT

## 2023-03-03 PROCEDURE — 20000000 HC ICU ROOM

## 2023-03-03 PROCEDURE — 99233 SBSQ HOSP IP/OBS HIGH 50: CPT | Mod: ,,, | Performed by: PODIATRIST

## 2023-03-03 PROCEDURE — 36415 COLL VENOUS BLD VENIPUNCTURE: CPT | Performed by: STUDENT IN AN ORGANIZED HEALTH CARE EDUCATION/TRAINING PROGRAM

## 2023-03-03 PROCEDURE — 94761 N-INVAS EAR/PLS OXIMETRY MLT: CPT

## 2023-03-03 PROCEDURE — 85730 THROMBOPLASTIN TIME PARTIAL: CPT | Mod: 91 | Performed by: HOSPITALIST

## 2023-03-03 PROCEDURE — 20600001 HC STEP DOWN PRIVATE ROOM

## 2023-03-03 RX ORDER — INSULIN ASPART 100 [IU]/ML
0-15 INJECTION, SOLUTION INTRAVENOUS; SUBCUTANEOUS
Status: DISCONTINUED | OUTPATIENT
Start: 2023-03-03 | End: 2023-03-10

## 2023-03-03 RX ORDER — GABAPENTIN 100 MG/1
100 CAPSULE ORAL 3 TIMES DAILY
Status: DISCONTINUED | OUTPATIENT
Start: 2023-03-03 | End: 2023-03-07

## 2023-03-03 RX ORDER — GLUCAGON 1 MG
1 KIT INJECTION
Status: DISCONTINUED | OUTPATIENT
Start: 2023-03-03 | End: 2023-03-03

## 2023-03-03 RX ORDER — INSULIN ASPART 100 [IU]/ML
1-10 INJECTION, SOLUTION INTRAVENOUS; SUBCUTANEOUS
Status: DISCONTINUED | OUTPATIENT
Start: 2023-03-03 | End: 2023-03-03

## 2023-03-03 RX ORDER — GLUCAGON 1 MG
1 KIT INJECTION
Status: DISCONTINUED | OUTPATIENT
Start: 2023-03-03 | End: 2023-03-17 | Stop reason: SDUPTHER

## 2023-03-03 RX ORDER — DEXTROSE 40 %
30 GEL (GRAM) ORAL
Status: DISCONTINUED | OUTPATIENT
Start: 2023-03-03 | End: 2023-03-17 | Stop reason: SDUPTHER

## 2023-03-03 RX ORDER — DEXTROSE 40 %
15 GEL (GRAM) ORAL
Status: DISCONTINUED | OUTPATIENT
Start: 2023-03-03 | End: 2023-03-17 | Stop reason: SDUPTHER

## 2023-03-03 RX ADMIN — ATORVASTATIN CALCIUM 40 MG: 40 TABLET, FILM COATED ORAL at 08:03

## 2023-03-03 RX ADMIN — HYDROMORPHONE HYDROCHLORIDE 0.5 MG: 1 INJECTION, SOLUTION INTRAMUSCULAR; INTRAVENOUS; SUBCUTANEOUS at 09:03

## 2023-03-03 RX ADMIN — OXYCODONE HYDROCHLORIDE 5 MG: 5 TABLET ORAL at 08:03

## 2023-03-03 RX ADMIN — HEPARIN SODIUM 15 UNITS/KG/HR: 10000 INJECTION, SOLUTION INTRAVENOUS at 05:03

## 2023-03-03 RX ADMIN — ACETAMINOPHEN 1000 MG: 500 TABLET ORAL at 10:03

## 2023-03-03 RX ADMIN — HEPARIN SODIUM 15 UNITS/KG/HR: 10000 INJECTION, SOLUTION INTRAVENOUS at 10:03

## 2023-03-03 RX ADMIN — ASPIRIN 81 MG: 81 TABLET, COATED ORAL at 08:03

## 2023-03-03 RX ADMIN — SODIUM CHLORIDE, POTASSIUM CHLORIDE, SODIUM LACTATE AND CALCIUM CHLORIDE 500 ML: 600; 310; 30; 20 INJECTION, SOLUTION INTRAVENOUS at 05:03

## 2023-03-03 RX ADMIN — INSULIN ASPART 4 UNITS: 100 INJECTION, SOLUTION INTRAVENOUS; SUBCUTANEOUS at 09:03

## 2023-03-03 RX ADMIN — INSULIN ASPART 4 UNITS: 100 INJECTION, SOLUTION INTRAVENOUS; SUBCUTANEOUS at 08:03

## 2023-03-03 RX ADMIN — PIPERACILLIN SODIUM AND TAZOBACTAM SODIUM 4.5 G: 4; .5 INJECTION, POWDER, FOR SOLUTION INTRAVENOUS at 01:03

## 2023-03-03 RX ADMIN — INSULIN ASPART 4 UNITS: 100 INJECTION, SOLUTION INTRAVENOUS; SUBCUTANEOUS at 01:03

## 2023-03-03 RX ADMIN — CARVEDILOL 12.5 MG: 12.5 TABLET, FILM COATED ORAL at 09:03

## 2023-03-03 RX ADMIN — HYDROCHLOROTHIAZIDE 25 MG: 25 TABLET ORAL at 08:03

## 2023-03-03 RX ADMIN — CILOSTAZOL 50 MG: 50 TABLET ORAL at 10:03

## 2023-03-03 RX ADMIN — AMLODIPINE BESYLATE 10 MG: 10 TABLET ORAL at 08:03

## 2023-03-03 RX ADMIN — PIPERACILLIN SODIUM AND TAZOBACTAM SODIUM 4.5 G: 4; .5 INJECTION, POWDER, FOR SOLUTION INTRAVENOUS at 08:03

## 2023-03-03 RX ADMIN — OXYCODONE HYDROCHLORIDE 5 MG: 5 TABLET ORAL at 03:03

## 2023-03-03 RX ADMIN — CILOSTAZOL 50 MG: 50 TABLET ORAL at 09:03

## 2023-03-03 RX ADMIN — VANCOMYCIN HYDROCHLORIDE 1000 MG: 1 INJECTION, POWDER, LYOPHILIZED, FOR SOLUTION INTRAVENOUS at 03:03

## 2023-03-03 RX ADMIN — ACETAMINOPHEN 1000 MG: 500 TABLET ORAL at 03:03

## 2023-03-03 RX ADMIN — SODIUM CHLORIDE, POTASSIUM CHLORIDE, SODIUM LACTATE AND CALCIUM CHLORIDE: 600; 310; 30; 20 INJECTION, SOLUTION INTRAVENOUS at 05:03

## 2023-03-03 RX ADMIN — HYDROMORPHONE HYDROCHLORIDE 0.5 MG: 1 INJECTION, SOLUTION INTRAMUSCULAR; INTRAVENOUS; SUBCUTANEOUS at 11:03

## 2023-03-03 RX ADMIN — CARVEDILOL 12.5 MG: 12.5 TABLET, FILM COATED ORAL at 08:03

## 2023-03-03 RX ADMIN — ACETAMINOPHEN 1000 MG: 500 TABLET ORAL at 05:03

## 2023-03-03 RX ADMIN — POLYETHYLENE GLYCOL 3350 17 G: 17 POWDER, FOR SOLUTION ORAL at 08:03

## 2023-03-03 RX ADMIN — GABAPENTIN 100 MG: 100 CAPSULE ORAL at 08:03

## 2023-03-03 RX ADMIN — GABAPENTIN 100 MG: 100 CAPSULE ORAL at 12:03

## 2023-03-03 RX ADMIN — PIPERACILLIN SODIUM AND TAZOBACTAM SODIUM 4.5 G: 4; .5 INJECTION, POWDER, FOR SOLUTION INTRAVENOUS at 12:03

## 2023-03-03 RX ADMIN — LOSARTAN POTASSIUM 100 MG: 50 TABLET, FILM COATED ORAL at 08:03

## 2023-03-03 RX ADMIN — INSULIN ASPART 4 UNITS: 100 INJECTION, SOLUTION INTRAVENOUS; SUBCUTANEOUS at 05:03

## 2023-03-03 RX ADMIN — HYDROMORPHONE HYDROCHLORIDE 0.5 MG: 1 INJECTION, SOLUTION INTRAMUSCULAR; INTRAVENOUS; SUBCUTANEOUS at 05:03

## 2023-03-03 NOTE — TRANSFER OF CARE
"Anesthesia Transfer of Care Note    Patient: Nelsy Marino    Procedure(s) Performed: Procedure(s) (LRB):  CREATION, BYPASS, ARTERIAL, FEMORAL TO FEMORAL, USING GRAFT, LEFT FEMORAL ENDARTERECTOMY, LEFT ILIAC ARTERY STENT PLACEMENT, RIGHT LOWER EXTREMITY ANGIOGRAM AND RIGHT SFA  ANGIOPLASTY (Bilateral)    Patient location: PACU    Anesthesia Type: general    Transport from OR: Transported from OR on 6-10 L/min O2 by face mask with adequate spontaneous ventilation    Post pain: adequate analgesia    Post assessment: no apparent anesthetic complications    Post vital signs: stable    Level of consciousness: awake    Nausea/Vomiting: no nausea/vomiting    Complications: none    Transfer of care protocol was followed      Last vitals:   Visit Vitals  BP (!) 136/67   Pulse 75   Temp 36.7 °C (98.1 °F) (Temporal)   Resp 18   Ht 5' 4" (1.626 m)   Wt 74 kg (163 lb 2.3 oz)   LMP  (LMP Unknown)   SpO2 100%   BMI 28.00 kg/m²     "

## 2023-03-03 NOTE — PROGRESS NOTES
[facility]  Critical Care - Surgery  Progress Note    Patient Name: Nelsy Marino  MRN: [id number]  Admission Date: 2/27/2023  Hospital Length of Stay: 4 days  Code Status: Full Code  Attending Provider: Danny Dunbar MD  Primary Care Provider: Juan F Garay   Principal Problem: Critical limb ischemia with history of revascularization of same extremity    Subjective:     Hospital/ICU Course:  No notes on file    No new subjective & objective note has been filed under this hospital service since the last note was generated.    Assessment/Plan:     Cardiac/Vascular  * Critical limb ischemia with history of revascularization of same extremity  82-year-old female with a past medical history of severe PAD, hypertension, type 2 diabetes, history of DVT who presents with right foot pain.  She underwent fem fem bypass on 3/2. Right SFA baloon stent placed. She was admitted to SICU for q1hr neurovascular monitoring after large vessel revascularization. Currently q2hr checks, likely step down.     Neuro/Psych:   -- Sedation: none  -- Pain: scheduled tylenol; oxy and dilaudid prn               CV:   -- SP revascularization of R LE sp SFA stenting      - On low intensity heparin drip   -  Q2 hr nvc  -- HTN   - continue home medications (amlodipine, carvedilol)  -- Severe PAD   - aspirin, plavix, statin  -- HDS       Pulm:   -- Goal O2 sat > 90%  -- Wean as able      Renal:  -- BUN/Cr 16 / 1.0  -- UOP 3L in 24 hrs  -- Monitor UOP  -- [place] for strict I/O      FEN / GI:   -- Net -300ml  -- Replace lytes as needed  -- Nutrition: diabetic diet  -- Dced LR @100/hr                            ID:   -- Tm: Afebrile; WBC 21 up from 8.45  -- On vanc, zosyn  -- No current signs of sepsis, will continue to monitor      Heme/Onc:   -- Hgb 9.6  -- Daily CBC      Endo:   -- Gluc goal 140-180  -- POCT Glucose Q ACHS  -- Continue subQ insulin therapy       PPx:   Feeding: diabetic diet  Analgesia/Sedation:  controlled on scheduled tylenol, with oxy prn  Thromboembolic prevention: on hep drip  HOB >30: yes  Stress Ulcer ppx: n/a  Glucose control: Critical care goal 140-180 g/dl  Bowel reg:   Invasive Lines/Drains/Airway: [place]  L Radial A line  18G x2  20G x1  Deescalation: dc'ed mIVF, Continue A line for q6hr blood draws.       Dispo/Code Status/Palliative:   -- SICU / Full Code  -- likely step down tomorrow morning if ok to advance NVC to q4        Critical secondary to Patient has a condition that poses threat to life and bodily function: Requires neurovascular checks s/p Vascular surgery.      Critical care was time spent personally by me on the following activities: development of treatment plan with patient or surrogate and bedside caregivers, discussions with consultants, evaluation of patient's response to treatment, examination of patient, ordering and performing treatments and interventions, ordering and review of laboratory studies, ordering and review of radiographic studies, pulse oximetry, re-evaluation of patient's condition.  This critical care time did not overlap with that of any other provider or involve time for any procedures.     Radha Stevens MD  Critical Care - Surgery  [facility]

## 2023-03-03 NOTE — CARE UPDATE
ICU Transfer of Care Note  Critical Care Medicine    Admit Date: 2/27/2023  LOS: 6    CC: Critical limb ischemia with history of revascularization of same extremity    Code Status: Full Code     Transfer to Hospital Medicine discussed with Francheska.     HPI and Hospital Course:     HPI:  82-year-old female with a past medical history of severe PAD, hypertension, type 2 diabetes, history of DVT who presents with right foot pain. She reports that her discoloration started since 2/7.  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 She underwent fem fem bypass on 3/2. She was found to have moderately diseased SFA but patent, good back-bleeding prior to angiogram intervention. Right SFA baloon stent placed. She was admitted to SICU for q1hr neurovascular monitoring after large vessel revascularization.     Hospital/ICU Course:  Admitted to the SICU following major vascular revascularization of SFA for intensive monitoring. Hemodynamically stable with reassuring dopplers. Transferring back to hospital medicine.       To Follow Up:     - Q4 dopplers once stepdown  - Blood glucose goals per primary team      Discharge Plan:     Critical limb ischemia with history of revascularization of same extremity  - Vascular surgery recent SFA stent placement  - Podiatry consulted  - Continue vancomycin/Zosyn  - Heparin gtt per vascular recommendations  - Neurovascular checks per vascular recommendations  - aspirin, cilazostol     History of DVT   Not on AC outpatient.       Type 2 diabetes mellitus  Continue inpatient insulin regiment     Essential hypertension  -Continue home medications:  Carvedilol 12.5 BID, Amlodipine 10 mg daily, hydrochlorothiazide 25 mg daily, losartan 100 mg daily      Call with questions [phone].

## 2023-03-03 NOTE — PROGRESS NOTES
Unable to doppler pulse to left dorsalis pedis. Warm blanket placed over extremity and will continue to closely monitor. Dr. Dunbar at bedside and able to doppler pulse to posterior tibial bilaterally (higher up and both marked. )   L posterior tibial doppler pulse faint.

## 2023-03-03 NOTE — PROGRESS NOTES
[facility] ([facility])  Vascular Surgery  Progress Note    Patient Name: Nelsy Marino  MRN: [id number]  Admission Date: 2/27/2023  Primary Care Provider: Juan F Garay    Subjective:     Interval History: NAEON. VSWNL. Patient remains in PACU.  Denies pain, numbness, decreased sensation. Doppler-able pulses (see exam below).    Post-Op Info:  Procedure(s) (LRB):  CREATION, BYPASS, ARTERIAL, FEMORAL TO FEMORAL, USING GRAFT, LEFT FEMORAL ENDARTERECTOMY, LEFT ILIAC ARTERY STENT PLACEMENT, RIGHT LOWER EXTREMITY ANGIOGRAM AND RIGHT SFA  ANGIOPLASTY (Bilateral)   1 Day Post-Op       Medications:  Continuous Infusions:   heparin (porcine) in D5W 15 Units/kg/hr (03/03/23 0519)    lactated ringers 100 mL/hr at 03/03/23 0515     Scheduled Meds:   acetaminophen  1,000 mg Oral Q8H    amLODIPine  10 mg Oral Daily    aspirin  81 mg Oral Daily    atorvastatin  40 mg Oral QHS    carvediloL  12.5 mg Oral BID    cilostazoL  50 mg Oral BID    hydroCHLOROthiazide  25 mg Oral Daily    insulin detemir U-100  15 Units Subcutaneous BID    losartan  100 mg Oral Daily    piperacillin-tazobactam (ZOSYN) IVPB  4.5 g Intravenous Q8H    polyethylene glycol  17 g Oral Daily    vancomycin (VANCOCIN) IVPB  1,000 mg Intravenous Q24H     PRN Meds:sodium chloride, albuterol-ipratropium, bisacodyL, dextrose 10%, dextrose 10%, dextrose, dextrose, glucagon (human recombinant), glucagon (human recombinant), heparin (PORCINE), heparin (PORCINE), HYDROmorphone, insulin aspart U-100, magnesium oxide, magnesium oxide, melatonin, naloxone, ondansetron, oxyCODONE, potassium bicarbonate, potassium bicarbonate, potassium bicarbonate, potassium, sodium phosphates, potassium, sodium phosphates, potassium, sodium phosphates, prochlorperazine, simethicone, sodium chloride 0.9%, sodium chloride 0.9%, Pharmacy to dose Vancomycin consult **AND** vancomycin - pharmacy to dose     Objective:     Vital Signs (Most Recent):  Temp: 98.1 °F (36.7 °C) (03/03/23  0400)  Pulse: 95 (03/03/23 0700)  Resp: 17 (03/03/23 0700)  BP: (!) 117/57 (03/03/23 0700)  SpO2: 98 % (03/03/23 0700)   Vital Signs (24h Range):  Temp:  [96.6 °F (35.9 °C)-98.1 °F (36.7 °C)] 98.1 °F (36.7 °C)  Pulse:  [74-97] 95  Resp:  [17-27] 17  SpO2:  [96 %-100 %] 98 %  BP: (117-167)/(57-76) 117/57  Arterial Line BP: (127-183)/(27-56) 142/43         Physical Exam  Vitals reviewed.   Constitutional:       Appearance: Normal appearance.   Cardiovascular:      Rate and Rhythm: Normal rate and regular rhythm.      Comments: R mono DP and PT   L mono AT   Abdominal:      General: Abdomen is flat.      Palpations: Abdomen is soft.      Comments: L groin dressing - not soaked, not hematoma.     Musculoskeletal:      Comments: R thigh incision with dressing - not soaked, no hematoma.    Neurological:      Mental Status: She is alert.     Significant Labs:  CBC:   Recent Labs   Lab 03/03/23  0405   WBC 10.18  10.18   RBC 3.49*  3.49*   HGB 8.9*  8.9*   HCT 28.3*  28.3*     284   MCV 81*  81*   MCH 25.5*  25.5*   MCHC 31.4*  31.4*     CMP:   Recent Labs   Lab 03/03/23  0405   *   CALCIUM 8.4*   ALBUMIN 2.4*   PROT 6.1   *   K 4.1   CO2 24      BUN 16   CREATININE 1.0   ALKPHOS 50*   ALT 15   AST 32   BILITOT 2.0*       Significant Diagnostics:  I have reviewed all pertinent imaging results/findings within the past 24 hours.    Assessment/Plan:     * Critical limb ischemia with history of revascularization of same extremity  Nelsy Marino is a 82 y.o. lady with severe PAD    - Keep in SICU today   - Q2H neurovasc checks  - Regular diet  - D/C [place]   - Keep a-line  - Continue Heparin gtt  - ASA/plavix/Statin  - Zosyn/Vanc  - Strict I/O  - PT/OT  - Podiatry following for future R TMA plans.      Jomar Maciel MD  Vascular Surgery  [facility] - Surgery (2nd Fl)

## 2023-03-03 NOTE — ASSESSMENT & PLAN NOTE
-Vascular surgery consult: Rec transfer to [facility] for hybrid OR surgery 3/2/23 for RLE bypass today with Dr. Dunbar  -Podiatry consulted  -Continue vancomycin/Zosyn, day 4  -Continue heparin gtt  -aspirin, cilazostol

## 2023-03-03 NOTE — BRIEF OP NOTE
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 Moderately diseased SFA but patent, good back-bleeding prior to angiogram intervention. Right SFA angioplasty with two vessel runoff via AT and Peroneal, retrograde filling of PT.     Estimated Blood Loss: 200 mL         Specimens:   Specimen (24h ago, onward)      None            [id number]

## 2023-03-03 NOTE — PROGRESS NOTES
Dr. Wiseman at bedside PACU 8 assessing patient, notified MD patient urine is pink tinged. MD states to continue to monitor.

## 2023-03-03 NOTE — SUBJECTIVE & OBJECTIVE
Interval History: pt went to surgery today. Afebrile preop. Stable vitals.     Review of Systems  Objective:     Vital Signs (Most Recent):  Temp: 98.1 °F (36.7 °C) (03/02/23 1107)  Pulse: 84 (03/02/23 1107)  Resp: 17 (03/02/23 1107)  BP: 135/62 (03/02/23 1107)  SpO2: 96 % (03/02/23 1107) Vital Signs (24h Range):  Temp:  [97.5 °F (36.4 °C)-99.1 °F (37.3 °C)] 98.1 °F (36.7 °C)  Pulse:  [80-93] 84  Resp:  [17-20] 17  SpO2:  [92 %-96 %] 96 %  BP: (124-148)/(55-63) 135/62     Weight: 74 kg (163 lb 2.3 oz)  Body mass index is 28 kg/m².    Intake/Output Summary (Last 24 hours) at 3/2/2023 1817  Last data filed at 3/2/2023 1813  Gross per 24 hour   Intake 2500 ml   Output 1440 ml   Net 1060 ml      Physical Exam

## 2023-03-03 NOTE — H&P
Valentin parrish - Surgery ([facility])  Critical Care - Surgery  History & Physical    Patient Name: Nelsy Marino  MRN: [id number]  Admission Date: 2/27/2023  Code Status: Full Code  Attending Physician: Percy Pritchard MD   Primary Care Provider: Juan F Garay   Principal Problem: Critical limb ischemia with history of revascularization of same extremity    Subjective:     HPI:  82-year-old female with a past medical history of severe PAD, hypertension, type 2 diabetes, history of DVT who presents with right foot pain. She reports that her discoloration started since 2/7.  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 She underwent fem fem bypass on 3/2. She was found to have moderately diseased SFA but patent, good back-bleeding prior to angiogram intervention. Right SFA baloon stent placed. She was admitted to SICU for q1hr neurovascular monitoring after large vessel revascularization.       Hospital/ICU Course:  No notes on file    Follow-up For: Procedure(s) (LRB):  CREATION, BYPASS, ARTERIAL, FEMORAL TO FEMORAL, USING GRAFT, LEFT FEMORAL ENDARTERECTOMY, LEFT ILIAC ARTERY STENT PLACEMENT, RIGHT LOWER EXTREMITY ANGIOGRAM AND RIGHT SFA  ANGIOPLASTY (Bilateral)    Post-Operative Day: 1 Day Post-Op     Past Medical History:   Diagnosis Date    CHF (congestive heart failure)     Diabetes mellitus     DVT (deep venous thrombosis)     Hypertension     Miscarriage        Past Surgical History:   Procedure Laterality Date     femoral vascular surgery       ANGIOGRAPHY OF LOWER EXTREMITY Right 2/23/2023    Procedure: Angiogram Extremity Unilateral;  Surgeon: Danny Dunbar MD;  Location: [facility];  Service: Vascular;  Laterality: Right;  RN PHONE PREOP 2/20/2023----CK CONSENT    CREATION OF ILIOFEMORAL ARTERY BYPASS Right 3/20/2019    Procedure: CREATION, BYPASS, ARTERIAL, ILIAC TO FEMORAL, RIGHT LOWER EXTREMITY, RIGHT PROFUNDAPLASTY;  Surgeon: Danny Dunbar,  MD;  Location: [facility] OR;  Service: Vascular;  Laterality: Right;  11:00AM START PER ANNE RICHARDS PREOP 3/13/2019  ACT'S, CELL SAVER, GRAFTS, BOOK WATER---NEED H/P  CONSENT IN AM------HGBA1C    HYSTERECTOMY      ?unsure cervix present    INCISION AND DRAINAGE Right 2019    Procedure: Incision and Drainage - R groin washout, possible muscle flap;  Surgeon: Danny Dunbar MD;  [facility];  Service: Vascular;  Laterality: Right;  groin washout    INCISION AND DRAINAGE OF GROIN Right 5/3/2019    Procedure: Incision and Drainage R groin;  Surgeon: Danny Dunbar MD;  [facility];  Service: Vascular;  Laterality: Right;       Review of patient's allergies indicates:   Allergen Reactions    Motrin [ibuprofen] Itching    Zosyn [piperacillin-tazobactam] Itching       Family History       Problem Relation (Age of Onset)    Diabetes Father    Hypertension Father          Tobacco Use    Smoking status: Former     Types: Cigarettes     Quit date:      Years since quittin.1    Smokeless tobacco: Never   Substance and Sexual Activity    Alcohol use: No    Drug use: No    Sexual activity: Never      Review of Systems   Constitutional:  Negative for chills and fever.   HENT:  Negative for hearing loss and trouble swallowing.    Eyes:  Negative for discharge and visual disturbance.   Respiratory:  Negative for chest tightness and shortness of breath.    Cardiovascular:  Negative for chest pain and palpitations.   Gastrointestinal:  Negative for abdominal distention, abdominal pain, nausea and vomiting.   Genitourinary:  Negative for difficulty urinating and hematuria.   Musculoskeletal:  Negative for arthralgias and back pain.   Skin:  Positive for color change and wound. Negative for rash.   Neurological:  Negative for dizziness and headaches.   Objective:     Vital Signs (Most Recent):  Temp: 97 °F (36.1 °C) (23 0000)  Pulse: 86 (23 0200)  Resp: 18 (23  0200)  BP: (!) 167/70 (03/03/23 0200)  SpO2: 100 % (03/03/23 0200)   Vital Signs (24h Range):  Temp:  [96.6 °F (35.9 °C)-99.1 °F (37.3 °C)] 97 °F (36.1 °C)  Pulse:  [74-89] 86  Resp:  [17-27] 18  SpO2:  [93 %-100 %] 100 %  BP: (132-167)/(55-76) 167/70  Arterial Line BP: (143-183)/(27-56) 183/56     Weight: 74 kg (163 lb 2.3 oz)  Body mass index is 28 kg/m².      Intake/Output Summary (Last 24 hours) at 3/3/2023 0236  Last data filed at 3/3/2023 0200  Gross per 24 hour   Intake 3563.15 ml   Output 3885 ml   Net -321.85 ml       Physical Exam  Vitals and nursing note reviewed.   Constitutional:       General: She is not in acute distress.     Appearance: Normal appearance. She is obese.   HENT:      Head: Normocephalic and atraumatic.      Nose: No congestion.      Mouth/Throat:      Mouth: Mucous membranes are moist.   Cardiovascular:      Rate and Rhythm: Normal rate and regular rhythm.      Pulses: Normal pulses.   Pulmonary:      Effort: Pulmonary effort is normal. No respiratory distress.   Abdominal:      General: Abdomen is flat.      Palpations: Abdomen is soft.      Tenderness: There is no abdominal tenderness.   Musculoskeletal:      Right lower leg: No edema.      Left lower leg: No edema.      Comments: R LE dry gangrene present   Skin:     General: Skin is warm and dry.   Neurological:      General: No focal deficit present.      Mental Status: She is alert and oriented to person, place, and time. Mental status is at baseline.   Psychiatric:         Mood and Affect: Mood normal.         Behavior: Behavior normal.       Vents:       Lines/Drains/Airways       Drain  Duration                  Urethral Catheter 03/02/23 1210 Non-latex;Straight-tip 16 Fr. <1 day              Arterial Line  Duration             Arterial Line 03/02/23 1213 Left Radial <1 day              Peripheral Intravenous Line  Duration                  Peripheral IV - Single Lumen 03/01/23 1449 20 G Left Antecubital 1 day         External  Jugular IV 03/02/23 1203 <1 day         Peripheral IV - Single Lumen 03/02/23 1209 18 G Left Upper Arm <1 day                    Significant Labs:    CBC/Anemia Profile:  Recent Labs   Lab 03/02/23  0817 03/02/23 1935 03/02/23  1939   WBC 7.74 20.80* 21.02*   HGB 9.5* 9.5* 9.6*   HCT 31.5* 29.8* 29.7*    303 301   MCV 82 80* 80*   RDW 14.4 14.3 14.1        Chemistries:  Recent Labs   Lab 03/01/23  0606 03/02/23  0817 03/02/23 1935    132* 134*   K 4.3 4.2 4.3    100 104   CO2 24 25 23   BUN 26* 22 18   CREATININE 1.6* 1.2 1.0   CALCIUM 9.3 9.2 8.5*   ALBUMIN  --   --  2.4*   PROT  --   --  6.4   BILITOT  --   --  1.4*   ALKPHOS  --   --  52*   ALT  --   --  12   AST  --   --  24   MG 2.3 2.1  --    PHOS 4.1 2.8  --        All pertinent labs within the past 24 hours have been reviewed.    Significant Imaging: I have reviewed all pertinent imaging results/findings within the past 24 hours.    Assessment/Plan:     Cardiac/Vascular  * Critical limb ischemia with history of revascularization of same extremity  82-year-old female with a past medical history of severe PAD, hypertension, type 2 diabetes, history of DVT who presents with right foot pain.  She underwent fem fem bypass on 3/2. Right SFA baloon stent placed. She was admitted to SICU for q1hr neurovascular monitoring after large vessel revascularization.     Neuro/Psych:   -- Sedation: none  -- Pain: scheduled tylenol with oxy and dilaudid prn               CV:   -- SP revascularization of R LE sp SFA stenting      - On low intensity heparin drip, ASA  -- HTN   - continue home medications  -- Severe PAD   - continue home cilastasol   -- HDS       Pulm:   -- Goal O2 sat > 90%  -- Wean as able      Renal:  -- BUN/Cr 18 / 1.0  -- UOP 3L in 24 hrs  -- Monitor UOP         FEN / GI:   -- Net -300ml  -- Replace lytes as needed  -- Nutrition: Clear liquids with meds  --  LR @100/hr                            ID:   -- Tm: afebrile; WBC 20.8 up  from 7.7  -- On vanc, zosyn  -- No current signs of sepsis, will continue to monitor      Heme/Onc:   -- Hgb 9.5  -- Daily CBC      Endo:   -- Gluc goal 140-180  -- POCT Glucose Q ACHS  -- Continue suqQ insulin therapy       PPx:   Feeding: Clear liquids  Analgesia/Sedation: controlled on scheduled tylenol, with oxy prn  Thromboembolic prevention: on hep drip  HOB >30: yes  Stress Ulcer ppx: n/a  Glucose control: Critical care goal 140-180 g/dl     Lines/Drains/Airway: [place]  L Radial A line  18G x2  20G x1      Dispo/Code Status/Palliative:   -- SICU / Full Code          Critical Care Daily Checklist:    A: Awake: RASS Goal/Actual Goal: RASS Goal: 0-->alert and calm  Actual:     B: Spontaneous Breathing Trial Performed?     C: SAT & SBT Coordinated?  n/a                      D: Delirium: CAM-ICU     E: Early Mobility Performed? Yes   F: Feeding Goal:    Status:     Current Diet Order   Procedures    Diet clear liquid      AS: Analgesia/Sedation Controlled   T: Thromboembolic Prophylaxis Hep drip   H: HOB > 300 Yes   U: Stress Ulcer Prophylaxis (if needed) n/a   G: Glucose Control BG goal 140-180   B: Bowel Function Stool Occurrence: 0   I: Indwelling Catheter (Lines & [place]) Necessity [place]  L Radial A line  18G x2  20G x1   D: De-escalation of Antimicrobials/Pharmacotherapies Progressing, currently on vanc, zosyn    Plan for the day/ETD Admit to SICU , q1 Neurovasc checks    Code Status:  Family/Goals of Care: Full Code         Critical secondary to Patient has a condition that poses threat to life and bodily function: revascularization of major artery sp R LE gangrene     Critical care was time spent personally by me on the following activities: development of treatment plan with patient or surrogate and bedside caregivers, discussions with consultants, evaluation of patient's response to treatment, examination of patient, ordering and performing treatments and interventions, ordering and review of  laboratory studies, ordering and review of radiographic studies, pulse oximetry, re-evaluation of patient's condition.  This critical care time did not overlap with that of any other provider or involve time for any procedures.     Benjamin Wiseman MD  Critical Care - Surgery  [facility] - Surgery (2nd Fl)

## 2023-03-03 NOTE — PROGRESS NOTES
Pt off the floor since approx. 1050. While on the floor, VSS, maintaining O2 >92% on RA. Pt A&Ox4. C/o tolerable pain. NPO for procedure. Family at bedside. Bed in lowest position, wheels locked, call light and personal belongings in reach. Family at bedside.

## 2023-03-03 NOTE — ASSESSMENT & PLAN NOTE
Nelsy Marino is a 82 y.o. lady with severe PAD    - Keep in SICU today   - Q2H neurovasc checks  - Regular diet  - Continue Heparin gtt  - ASA/plavix/Statin  - Strict I/O  - PT/OT  - Podiatry following for future R TMA plans.

## 2023-03-03 NOTE — NURSING TRANSFER
Nursing Transfer Note      3/3/2023     Reason patient is being transferred: post op    Transfer To: SICU [id number]    Transfer via bed    Transfer with cardiac monitoring, 2L NC    Transported by RN and pct    Medicines sent: heparin infusing, heparin un spiked bag; zosyn infusing; insulin detemir and aspart pens.     Any special needs or follow-up needed: routine    Chart send with patient: Yes    Notified: spouse    Patient reassessed at: 1201      Upon arrival to floor: cardiac monitor applied;   bilateral lower extremity pulses checked with SICU RN.   Faint L and R posterior tibial doppler pulse noted  Absent dorsalis pedis pulse noted to L  Dopplerable dorsalis pedis pulse noted to right       Swelling noted when arrived to room to right groin

## 2023-03-03 NOTE — ANESTHESIA POSTPROCEDURE EVALUATION
Anesthesia Post Evaluation    Patient: Nelsy Marino    Procedure(s) Performed: Procedure(s) (LRB):  CREATION, BYPASS, ARTERIAL, FEMORAL TO FEMORAL, USING GRAFT, LEFT FEMORAL ENDARTERECTOMY, LEFT ILIAC ARTERY STENT PLACEMENT, RIGHT LOWER EXTREMITY ANGIOGRAM AND RIGHT SFA  ANGIOPLASTY (Bilateral)    Final Anesthesia Type: general      Patient location during evaluation: PACU  Patient participation: Yes- Able to Participate  Level of consciousness: awake and alert  Post-procedure vital signs: reviewed and stable  Pain management: adequate  Airway patency: patent    PONV status at discharge: No PONV  Anesthetic complications: no      Cardiovascular status: blood pressure returned to baseline  Respiratory status: unassisted  Hydration status: euvolemic  Follow-up not needed.          Vitals Value Taken Time   /67 03/02/23 1930   Temp 37 03/02/23 1931   Pulse 73 03/02/23 1931   Resp 26 03/02/23 1931   SpO2 100 % 03/02/23 1931   Vitals shown include unvalidated device data.      No case tracking events are documented in the log.      Pain/Ana Maria Score: Pain Rating Prior to Med Admin: 6 (3/2/2023  5:18 AM)  Pain Rating Post Med Admin: 0 (3/2/2023  6:18 AM)

## 2023-03-03 NOTE — PROGRESS NOTES
Valentin Lopez - Surgery ([facility])  [facility] Medicine  Progress Note    Patient Name: Nelsy Marino  MRN: [id number]  Patient Class: IP- Inpatient   Admission Date: 2/27/2023  Length of Stay: 3 days  Attending Physician: Percy Pritchard MD  Primary Care Provider: Juan F Garay        Subjective:     Principal Problem:Critical limb ischemia with history of revascularization of same extremity        HPI:  This is an 82-year-old female with a past medical history of severe PAD, hypertension, type 2 diabetes, history of DVT who presents with right foot pain.    Patient reports worsening right foot pain, swelling and discoloration that started after she had an ingrown toenail removed by her podiatrist.  She reports that her discoloration started since 2/7. Of note, the patient underwent an angiogram on 02/23 by vascular surgery. In the ED, she was tachycardic (112), and hypertensive.  Labs showed microcytic anemia (10.2), elevated sed rate (112), elevated CRP (56.5), elevated lactic acid (2.5).  Foot x-ray showed DJD and spurs on the calcaneus with no acute process.  Vascular surgery was consulted and recommended continuing antibiotics and evaluating in the morning.  She was given morphine 4 mg IV, vancomycin, Zosyn and was admitted for further management.      Overview/Hospital Course:   82-year-old female with a past medical history of severe PAD, hypertension, type 2 diabetes, history of DVT admitted on 02/27/2023 for gangrene of right toes. Of note, patient recent underwent LE angiogram by Dr. Tran on 02/23/23. Per OP report, was able to get successful revascularization / resolution of stenosis. XR right foot with no fracture, dislocation, bone destruction, or foreign body seen.  There is DJD and spurs on the calcaneus.  Was started on IV Zosyn and vancomycin on admit. Blood  cx with NGTD. On 02/28, patient with hypoglycemia but was asymptomatic. Improved with glucogen. Likely due to NPO status and was still  receiving insulin.  Cardiology consulted for preoperative clearance.  Recommended stress test.  Normal myocardial perfusion scan.  Cardiology deem patient at acceptable cardiac risk for [facility] surgery/anesthesia.  Vascular surgery recommended  [place] [facility] [place]. Plans for RLE bypass in the AM. Transfer initiated       Interval History: pt went to surgery today. Afebrile preop. Stable vitals.     Review of Systems  Objective:     Vital Signs (Most Recent):  Temp: 98.1 °F (36.7 °C) (03/02/23 1107)  Pulse: 84 (03/02/23 1107)  Resp: 17 (03/02/23 1107)  BP: 135/62 (03/02/23 1107)  SpO2: 96 % (03/02/23 1107) Vital Signs (24h Range):  Temp:  [97.5 °F (36.4 °C)-99.1 °F (37.3 °C)] 98.1 °F (36.7 °C)  Pulse:  [80-93] 84  Resp:  [17-20] 17  SpO2:  [92 %-96 %] 96 %  BP: (124-148)/(55-63) 135/62     Weight: 74 kg (163 lb 2.3 oz)  Body mass index is 28 kg/m².    Intake/Output Summary (Last 24 hours) at 3/2/2023 1817  Last data filed at 3/2/2023 1813  Gross per 24 hour   Intake 2500 ml   Output 1440 ml   Net 1060 ml      Physical Exam          Assessment/Plan:      * Critical limb ischemia with history of revascularization of same extremity  -Vascular surgery consult: [place] [facility] [place]2/23 for RLE bypass today with Dr. Dunbar  -Podiatry consulted  -Continue vancomycin/Zosyn, day 4  -Continue heparin gtt  -aspirin, cilazostol      Hypoglycemia  Better now  Continue levemir and LDSS    Sepsis  Sepsis element resolved; see above   -Right foot appears to have wet gangrene on exam  -Blood cx  NGTD  - abx as above    History of DVT (deep vein thrombosis)  Not on AC outpatient.      Type 2 diabetes mellitus  Stable from hypoglycemia standpoint; continue levemir and LDSS for now as is      Essential hypertension  -Continue home medications:  Amlodipine 10 mg daily, hydrochlorothiazide 25 mg daily, losartan 100 mg daily        VTE Risk Mitigation (From admission, onward)         Ordered      heparin (porcine) injection  As needed (PRN)         03/02/23 1438     IP VTE HIGH RISK PATIENT  Once         02/27/23 2200     Place sequential compression device  Until discontinued         02/27/23 2200                Discharge Planning   JUAN ALBERTO:      Code Status: Full Code   Is the patient medically ready for discharge?:     Reason for patient still in hospital (select all that apply): Treatment  Discharge Plan A: Home with family, Home Health   Discharge Delays: (!) Procedure Scheduling (IR, OR, Labs, Echo, Cath, Echo, EEG)              Percy Pritchard MD  Department of [facility] Medicine   [facility] - Surgery ([facility])

## 2023-03-03 NOTE — PROGRESS NOTES
Patient in PACU Slot 8 awaiting a SICU room assignment. Patient AAOx4, VSS, on 2LNC. Bilateral groin sites C/D/I, doppler able pulses to right and left lower extremities.Heparin gtt at 15 units/kg/hr. Family updated on patient location and plan of care, see flow sheet for assessment.

## 2023-03-03 NOTE — ANESTHESIA PREPROCEDURE EVALUATION
[facility]  Anesthesia Pre-Operative Evaluation         Patient Name: Nelsy Marino  YOB: 1940  MRN: [id number]    SUBJECTIVE:     Pre-operative evaluation for Procedure(s) (LRB):  AMPUTATION, FOOT, TRANSMETATARSAL (Right)     03/03/2023    Nelsy Marino is a 82 y.o. female w/ a significant PMHx of severe PAD, HTN, HFpEF, T2DM, and prior hx of DVT who initially presented to [facility] with R foot dry gangrene on 2/27.    Patient now presents for the above procedure(s).      LDA:        Peripheral IV - Single Lumen 03/01/23 1449 20 G Left Antecubital (Active)   Site Assessment Clean;Intact;Dry 03/03/23 1201   Extremity Assessment Distal to IV No abnormal discoloration 03/03/23 1201   Line Status Infusing 03/03/23 1201   Dressing Status Clean;Intact;Dry 03/03/23 1201   Dressing Intervention Integrity maintained 03/03/23 1201   Number of days: 1            Peripheral IV - Single Lumen 03/02/23 1209 18 G Left Upper Arm (Active)   Site Assessment Clean;Dry;Intact 03/03/23 1201   Extremity Assessment Distal to IV No abnormal discoloration 03/03/23 1201   Line Status Infusing 03/03/23 1201   Dressing Status Clean;Dry;Intact 03/03/23 1201   Dressing Intervention Integrity maintained 03/03/23 1201   Number of days: 1            External Jugular IV 03/02/23 1203 (Active)   Site Assessment Clean;Intact;Dry 03/03/23 1201   Line Status Saline locked 03/03/23 1201   Dressing Status Clean;Dry;Intact 03/03/23 1201   Dressing Intervention Integrity maintained 03/03/23 1201   Number of days: 1       Arterial Line 03/02/23 1213 Left Radial (Active)   Site Assessment Dry;Clean;Intact 03/03/23 1201   Line Status Pulsatile blood flow 03/03/23 0400   Art Line Waveform Appropriate 03/03/23 1201   Arterial Line Interventions Leveled;Zeroed and calibrated 03/03/23 1201   Color/Movement/Sensation Capillary refill less than 3 sec 03/03/23 0400   Dressing Type Transparent (Tegaderm) 03/03/23 1201    Dressing Status Clean;Dry;Intact 03/03/23 1201   Dressing Intervention Integrity maintained 03/03/23 1201   Number of days: 1       Prev airway: None documented.    Drips:    heparin (porcine) in D5W 15 Units/kg/hr (03/03/23 0519)       Patient Active Problem List   Diagnosis    Lichen sclerosus et atrophicus    Essential hypertension    Type 2 diabetes mellitus    Diastolic CHF, chronic    Obesity (BMI 30-39.9)    History of DVT (deep vein thrombosis)    Wound dehiscence    Fluid collection at surgical site    Cellulitis    Postoperative seroma of musculoskeletal structure after non-musculoskeletal procedure    Abscess of right groin    Critical limb ischemia with history of revascularization of same extremity    Sepsis    Hypoglycemia    Hyponatremia       Review of patient's allergies indicates:   Allergen Reactions    Motrin [ibuprofen] Itching    Zosyn [piperacillin-tazobactam] Itching       Current Inpatient Medications:   acetaminophen  1,000 mg Oral Q8H    amLODIPine  10 mg Oral Daily    aspirin  81 mg Oral Daily    atorvastatin  40 mg Oral QHS    carvediloL  12.5 mg Oral BID    cilostazoL  50 mg Oral BID    gabapentin  100 mg Oral TID    insulin detemir U-100  15 Units Subcutaneous BID    losartan  100 mg Oral Daily    piperacillin-tazobactam (ZOSYN) IVPB  4.5 g Intravenous Q8H    polyethylene glycol  17 g Oral Daily    vancomycin (VANCOCIN) IVPB  1,000 mg Intravenous Q24H       No current facility-administered medications on file prior to encounter.     Current Outpatient Medications on File Prior to Encounter   Medication Sig Dispense Refill    carvediloL (COREG) 12.5 MG tablet Take 12.5 mg by mouth 2 (two) times daily.      amlodipine (NORVASC) 10 MG tablet Take 10 mg by mouth once daily.      aspirin (ECOTRIN) 81 MG EC tablet Take 81 mg by mouth once daily.      BLOOD SUGAR DIAGNOSTIC (TRUE METRIX GLUCOSE TEST STRIP MISC) by Misc.(Non-Drug; Combo Route) route.       "cilostazol (PLETAL) 50 MG Tab Take 1 tablet (50 mg total) by mouth 2 (two) times daily. If patient tolerate medication after 4 weeks, increase dose to 100 mg twice daily. 60 tablet 11    glipiZIDE (GLUCOTROL) 10 MG tablet Take 10 mg by mouth 2 (two) times daily before meals.      insulin aspart protamine-insulin aspart (NOVOLOG 70/30) 100 unit/mL (70-30) InPn pen Inject 50 Units into the skin every morning.      insulin aspart protamine-insulin aspart (NOVOLOG 70/30) 100 unit/mL (70-30) InPn pen Inject 50 Units into the skin every evening.      insulin syringe-needle,dispos. 0.3 mL 30 gauge x 5/16" Syrg by Misc.(Non-Drug; Combo Route) route.      losartan-hydrochlorothiazide 100-25 mg (HYZAAR) 100-25 mg per tablet Take 1 tablet by mouth once daily.      metformin (GLUCOPHAGE) 1000 MG tablet Take 1,000 mg by mouth 2 (two) times daily with meals.      ondansetron (ZOFRAN) 8 MG tablet   0       Past Surgical History:   Procedure Laterality Date     femoral vascular surgery       ANGIOGRAPHY OF LOWER EXTREMITY Right 2/23/2023    Procedure: Angiogram Extremity Unilateral;  Surgeon: Danny Dunbar MD;  Location: [facility] OR;  Service: Vascular;  Laterality: Right;  RN PHONE PREOP 2/20/2023----CK CONSENT    CREATION OF ILIOFEMORAL ARTERY BYPASS Right 3/20/2019    Procedure: CREATION, BYPASS, ARTERIAL, ILIAC TO FEMORAL, RIGHT LOWER EXTREMITY, RIGHT PROFUNDAPLASTY;  Surgeon: Danny Dunbar MD;  Location: [facility] [place] OR;  Service: Vascular;  Laterality: Right;  11:00AM START PER ANNE RICHARDS PREOP 3/13/2019  ACT'S, CELL SAVER, GRAFTS, BOOK WATER---NEED H/P  CONSENT IN AM------HGBA1C    HYSTERECTOMY      ?unsure cervix present    INCISION AND DRAINAGE Right 4/12/2019    Procedure: Incision and Drainage - R groin washout, possible muscle flap;  Surgeon: Danny Dunbar MD;  Location: [facility] OR [address] [place] [address];  Service: Vascular;  Laterality: Right;  groin washout    INCISION AND DRAINAGE OF GROIN Right 5/3/2019    " Procedure: Incision and Drainage R groin;  Surgeon: Danny Dunbar MD;  Location: [facility] OR [address] [place] [address];  Service: Vascular;  Laterality: Right;       Social History     Socioeconomic History    Marital status:    Tobacco Use    Smoking status: Former     Types: Cigarettes     Quit date:      Years since quittin.1    Smokeless tobacco: Never   Substance and Sexual Activity    Alcohol use: No    Drug use: No    Sexual activity: Never       OBJECTIVE:     Vital Signs Range (Last 24H):  Temp:  [35.9 °C (96.6 °F)-36.7 °C (98.1 °F)]   Pulse:  [74-99]   Resp:  [15-31]   BP: (106-167)/(57-76)   SpO2:  [91 %-100 %]   Arterial Line BP: ()/(27-56)       Significant Labs:  Lab Results   Component Value Date    WBC 10.18 2023    WBC 10.18 2023    HGB 8.9 (L) 2023    HGB 8.9 (L) 2023    HCT 28.3 (L) 2023    HCT 28.3 (L) 2023     2023     2023    CHOL 109 (L) 2023    TRIG 58 2023    HDL 38 (L) 2023    ALT 15 2023    AST 32 2023     (L) 2023    K 4.1 2023     2023    CREATININE 1.0 2023    BUN 16 2023    CO2 24 2023    INR 1.2 2023    HGBA1C 8.2 (H) 2023       Diagnostic Studies: No relevant studies.    EKG:   Results for orders placed or performed during the hospital encounter of 23   EKG 12-lead    Collection Time: 23  2:47 PM    Narrative    Test Reason : Z01.810,    Vent. Rate : 106 BPM     Atrial Rate : 106 BPM     P-R Int : 156 ms          QRS Dur : 076 ms      QT Int : 326 ms       P-R-T Axes : 040 037 002 degrees     QTc Int : 433 ms    Sinus tachycardia  Cannot rule out Inferior infarct ,age undetermined  Abnormal ECG  When compared with ECG of 21-MAR-2019 07:47,  Significant changes have occurred  Confirmed by Demarco Wright MD ([id number]) on 2023 2:15:58 PM    Referred By: NAHEED   SELF           Confirmed By:Demarco Wright  MD       2D ECHO:  TTE:  Results for orders placed or performed during the hospital encounter of 02/27/23   Echo   Result Value Ref Range    BSA 1.83 m2    TDI SEPTAL 0.05 m/s    LV LATERAL E/E' RATIO 20.50 m/s    LV SEPTAL E/E' RATIO 16.40 m/s    LA WIDTH 4.10 cm    IVC diameter 11 cm    Left Ventricular Outflow Tract Mean Velocity 1.01 cm/s    Left Ventricular Outflow Tract Mean Gradient 4.81 mmHg    TDI LATERAL 0.04 m/s    LVIDd 3.63 3.5 - 6.0 cm    IVS 1.02 0.6 - 1.1 cm    Posterior Wall 1.12 (A) 0.6 - 1.1 cm    LVIDs 2.50 2.1 - 4.0 cm    FS 31 28 - 44 %    LA volume 67.61 cm3    Sinus 3.10 cm    STJ 1.94 cm    LV mass 120.63 g    LA size 3.90 cm    RVDD 3.71 cm    TAPSE 1.90 cm    Left Ventricle Relative Wall Thickness 0.62 cm    AV mean gradient 10 mmHg    AV valve area 2.05 cm2    AV Velocity Ratio 0.73     AV index (prosthetic) 0.69     MV mean gradient 4 mmHg    MV valve area by continuity eq 2.51 cm2    E/A ratio 0.58     Mean e' 0.05 m/s    E wave deceleration time 123.77 msec    IVRT 133.79 msec    LVOT diameter 1.94 cm    LVOT area 3.0 cm2    LVOT peak angel 1.48 m/s    LVOT peak VTI 20.40 cm    Ao peak angel 2.04 m/s    Ao VTI 29.40 cm    LVOT stroke volume 60.27 cm3    AV peak gradient 17 mmHg    MV peak gradient 9 mmHg    E/E' ratio 18.22 m/s    MV Peak E Angel 0.82 m/s    TR Max Angel 2.90 m/s    MV VTI 24.0 cm    MV Peak A Angel 1.42 m/s    LV Systolic Volume 22.25 mL    LV Systolic Volume Index 12.4 mL/m2    LV Diastolic Volume 55.67 mL    LV Diastolic Volume Index 31.10 mL/m2    LA Volume Index 37.8 mL/m2    LV Mass Index 67 g/m2    RA Major Axis 4.69 cm    Left Atrium Minor Axis 4.93 cm    Left Atrium Major Axis 5.02 cm    Triscuspid Valve Regurgitation Peak Gradient 34 mmHg    RA Width 4.00 cm    Right Atrial Pressure (from IVC) 3 mmHg    EF 65 %    TV rest pulmonary artery pressure 37 mmHg    Narrative    · The left ventricle is normal in size with mild concentric hypertrophy   and normal systolic  function.  · The estimated ejection fraction is 65%.  · Grade I left ventricular diastolic dysfunction.  · Normal right ventricular size with normal right ventricular systolic   function.  · The estimated PA systolic pressure is 37 mmHg.          PHILL:  No results found for this or any previous visit.    ASSESSMENT/PLAN:                                                                                                                  03/03/2023  Nelsy Marino is a 82 y.o., female.      Pre-op Assessment    I have reviewed the Patient Summary Reports.     I have reviewed the Nursing Notes. I have reviewed the NPO Status.   I have reviewed the Medications.     Review of Systems  Anesthesia Hx:  History of prior surgery of interest to airway management or planning: Denies Family Hx of Anesthesia complications.   Denies Personal Hx of Anesthesia complications.   Cardiovascular:   Hypertension CHF PVD    Endocrine:   Diabetes, type 2        Physical Exam  General: Well nourished, Cooperative, Alert and Oriented    Airway:  Mallampati: III   Mouth Opening: Normal  TM Distance: Normal  Tongue: Normal  Neck ROM: Normal ROM    Dental:  Dentures, Edentulous        Anesthesia Plan  Type of Anesthesia, risks & benefits discussed:    Anesthesia Type: MAC  Intra-op Monitoring Plan: Standard ASA Monitors  Post Op Pain Control Plan: multimodal analgesia  Induction:  IV  Airway Plan: Direct, Post-Induction  Informed Consent: Informed consent signed with the Patient and all parties understand the risks and agree with anesthesia plan.  All questions answered.   ASA Score: 3  Day of Surgery Review of History & Physical: H&P completed by Anesthesiologist.    Ready For Surgery From Anesthesia Perspective.     .

## 2023-03-03 NOTE — ASSESSMENT & PLAN NOTE
82-year-old female with a past medical history of severe PAD, hypertension, type 2 diabetes, history of DVT who presents with right foot pain.  She underwent fem fem bypass on 3/2. Right SFA baloon stent placed. She was admitted to SICU for q1hr neurovascular monitoring after large vessel revascularization.     Neuro/Psych:   -- Sedation: none  -- Pain: scheduled tylenol with oxy and dilaudid prn               CV:   -- SP revascularization of R LE sp SFA stenting      - On low intensity heparin drip, ASA  -- HTN   - continue home medications  -- Severe PAD   - continue home cilastasol   -- HDS       Pulm:   -- Goal O2 sat > 90%  -- Wean as able      Renal:  -- BUN/Cr 18 / 1.0  -- UOP 3L in 24 hrs  -- Monitor UOP         FEN / GI:   -- Net -300ml  -- Replace lytes as needed  -- Nutrition: Clear liquids with meds  --  LR @100/hr                            ID:   -- Tm: afebrile; WBC 20.8 up from 7.7  -- On vanc, zosyn  -- No current signs of sepsis, will continue to monitor      Heme/Onc:   -- Hgb 9.5  -- Daily CBC      Endo:   -- Gluc goal 140-180  -- POCT Glucose Q ACHS  -- Continue suqQ insulin therapy       PPx:   Feeding: Clear liquids  Analgesia/Sedation: controlled on scheduled tylenol, with oxy prn  Thromboembolic prevention: on hep drip  HOB >30: yes  Stress Ulcer ppx: n/a  Glucose control: Critical care goal 140-180 g/dl     Lines/Drains/Airway: [place]  L Radial A line  18G x2  20G x1      Dispo/Code Status/Palliative:   -- SICU / Full Code

## 2023-03-03 NOTE — HPI
82-year-old female with a past medical history of severe PAD, hypertension, type 2 diabetes, history of DVT who presents with right foot pain. She reports that her discoloration started since 2/7.  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 She underwent fem fem bypass on 3/2. She was found to have moderately diseased SFA but patent, good back-bleeding prior to angiogram intervention. Right SFA baloon stent placed. She was admitted to SICU for q1hr neurovascular monitoring after large vessel revascularization.

## 2023-03-03 NOTE — SUBJECTIVE & OBJECTIVE
Follow-up For: Procedure(s) (LRB):  CREATION, BYPASS, ARTERIAL, FEMORAL TO FEMORAL, USING GRAFT, LEFT FEMORAL ENDARTERECTOMY, LEFT ILIAC ARTERY STENT PLACEMENT, RIGHT LOWER EXTREMITY ANGIOGRAM AND RIGHT SFA  ANGIOPLASTY (Bilateral)    Post-Operative Day: 1 Day Post-Op     Past Medical History:   Diagnosis Date    CHF (congestive heart failure)     Diabetes mellitus     DVT (deep venous thrombosis)     Hypertension     Miscarriage        Past Surgical History:   Procedure Laterality Date     femoral vascular surgery       ANGIOGRAPHY OF LOWER EXTREMITY Right 2/23/2023    Procedure: Angiogram Extremity Unilateral;  Surgeon: Danny Dunbar MD;  Location: [facility];  Service: Vascular;  Laterality: Right;  RN PHONE PREOP 2/20/2023----CK CONSENT    CREATION OF ILIOFEMORAL ARTERY BYPASS Right 3/20/2019    Procedure: CREATION, BYPASS, ARTERIAL, ILIAC TO FEMORAL, RIGHT LOWER EXTREMITY, RIGHT PROFUNDAPLASTY;  Surgeon: Danny Dunbar MD;  Location: [facility] OR;  Service: Vascular;  Laterality: Right;  11:00AM START PER ANNE  RN PREOP 3/13/2019  ACT'S, CELL SAVER, GRAFTS, BOOK WATER---NEED H/P  CONSENT IN AM------HGBA1C    HYSTERECTOMY      ?unsure cervix present    INCISION AND DRAINAGE Right 4/12/2019    Procedure: Incision and Drainage - R groin washout, possible muscle flap;  Surgeon: Danny Dunbar MD;  Location: [facility] OR [address] [place] [address];  Service: Vascular;  Laterality: Right;  groin washout    INCISION AND DRAINAGE OF GROIN Right 5/3/2019    Procedure: Incision and Drainage R groin;  Surgeon: Danny Dunbar MD;  Location: [facility] OR [address] [place] [address];  Service: Vascular;  Laterality: Right;       Review of patient's allergies indicates:   Allergen Reactions    Motrin [ibuprofen] Itching    Zosyn [piperacillin-tazobactam] Itching       Family History       Problem Relation (Age of Onset)    Diabetes Father    Hypertension Father          Tobacco Use    Smoking status: Former     Types: Cigarettes     Quit date: 1980      Years since quittin.1    Smokeless tobacco: Never   Substance and Sexual Activity    Alcohol use: No    Drug use: No    Sexual activity: Never      Review of Systems   Constitutional:  Negative for chills and fever.   HENT:  Negative for hearing loss and trouble swallowing.    Eyes:  Negative for discharge and visual disturbance.   Respiratory:  Negative for chest tightness and shortness of breath.    Cardiovascular:  Negative for chest pain and palpitations.   Gastrointestinal:  Negative for abdominal distention, abdominal pain, nausea and vomiting.   Genitourinary:  Negative for difficulty urinating and hematuria.   Musculoskeletal:  Negative for arthralgias and back pain.   Skin:  Positive for color change and wound. Negative for rash.   Neurological:  Negative for dizziness and headaches.   Objective:     Vital Signs (Most Recent):  Temp: 97 °F (36.1 °C) (23 0000)  Pulse: 86 (23 0200)  Resp: 18 (23 0200)  BP: (!) 167/70 (23 0200)  SpO2: 100 % (23 0200)   Vital Signs (24h Range):  Temp:  [96.6 °F (35.9 °C)-99.1 °F (37.3 °C)] 97 °F (36.1 °C)  Pulse:  [74-89] 86  Resp:  [17-27] 18  SpO2:  [93 %-100 %] 100 %  BP: (132-167)/(55-76) 167/70  Arterial Line BP: (143-183)/(27-56) 183/56     Weight: 74 kg (163 lb 2.3 oz)  Body mass index is 28 kg/m².      Intake/Output Summary (Last 24 hours) at 3/3/2023 0236  Last data filed at 3/3/2023 0200  Gross per 24 hour   Intake 3563.15 ml   Output 3885 ml   Net -321.85 ml       Physical Exam  Vitals and nursing note reviewed.   Constitutional:       General: She is not in acute distress.     Appearance: Normal appearance. She is obese.   HENT:      Head: Normocephalic and atraumatic.      Nose: No congestion.      Mouth/Throat:      Mouth: Mucous membranes are moist.   Cardiovascular:      Rate and Rhythm: Normal rate and regular rhythm.      Pulses: Normal pulses.   Pulmonary:      Effort: Pulmonary effort is normal. No respiratory distress.    Abdominal:      General: Abdomen is flat.      Palpations: Abdomen is soft.      Tenderness: There is no abdominal tenderness.   Musculoskeletal:      Right lower leg: No edema.      Left lower leg: No edema.      Comments: R LE dry gangrene present   Skin:     General: Skin is warm and dry.   Neurological:      General: No focal deficit present.      Mental Status: She is alert and oriented to person, place, and time. Mental status is at baseline.   Psychiatric:         Mood and Affect: Mood normal.         Behavior: Behavior normal.       Vents:       Lines/Drains/Airways       Drain  Duration                  Urethral Catheter 03/02/23 1210 Non-latex;Straight-tip 16 Fr. <1 day              Arterial Line  Duration             Arterial Line 03/02/23 1213 Left Radial <1 day              Peripheral Intravenous Line  Duration                  Peripheral IV - Single Lumen 03/01/23 1449 20 G Left Antecubital 1 day         External Jugular IV 03/02/23 1203 <1 day         Peripheral IV - Single Lumen 03/02/23 1209 18 G Left Upper Arm <1 day                    Significant Labs:    CBC/Anemia Profile:  Recent Labs   Lab 03/02/23  0817 03/02/23  1935 03/02/23  1939   WBC 7.74 20.80* 21.02*   HGB 9.5* 9.5* 9.6*   HCT 31.5* 29.8* 29.7*    303 301   MCV 82 80* 80*   RDW 14.4 14.3 14.1        Chemistries:  Recent Labs   Lab 03/01/23  0606 03/02/23  0817 03/02/23  1935    132* 134*   K 4.3 4.2 4.3    100 104   CO2 24 25 23   BUN 26* 22 18   CREATININE 1.6* 1.2 1.0   CALCIUM 9.3 9.2 8.5*   ALBUMIN  --   --  2.4*   PROT  --   --  6.4   BILITOT  --   --  1.4*   ALKPHOS  --   --  52*   ALT  --   --  12   AST  --   --  24   MG 2.3 2.1  --    PHOS 4.1 2.8  --        All pertinent labs within the past 24 hours have been reviewed.    Significant Imaging: I have reviewed all pertinent imaging results/findings within the past 24 hours.

## 2023-03-03 NOTE — SUBJECTIVE & OBJECTIVE
Medications:  Continuous Infusions:   heparin (porcine) in D5W 15 Units/kg/hr (03/03/23 0519)    lactated ringers 100 mL/hr at 03/03/23 0515     Scheduled Meds:   acetaminophen  1,000 mg Oral Q8H    amLODIPine  10 mg Oral Daily    aspirin  81 mg Oral Daily    atorvastatin  40 mg Oral QHS    carvediloL  12.5 mg Oral BID    cilostazoL  50 mg Oral BID    hydroCHLOROthiazide  25 mg Oral Daily    insulin detemir U-100  15 Units Subcutaneous BID    losartan  100 mg Oral Daily    piperacillin-tazobactam (ZOSYN) IVPB  4.5 g Intravenous Q8H    polyethylene glycol  17 g Oral Daily    vancomycin (VANCOCIN) IVPB  1,000 mg Intravenous Q24H     PRN Meds:sodium chloride, albuterol-ipratropium, bisacodyL, dextrose 10%, dextrose 10%, dextrose, dextrose, glucagon (human recombinant), glucagon (human recombinant), heparin (PORCINE), heparin (PORCINE), HYDROmorphone, insulin aspart U-100, magnesium oxide, magnesium oxide, melatonin, naloxone, ondansetron, oxyCODONE, potassium bicarbonate, potassium bicarbonate, potassium bicarbonate, potassium, sodium phosphates, potassium, sodium phosphates, potassium, sodium phosphates, prochlorperazine, simethicone, sodium chloride 0.9%, sodium chloride 0.9%, Pharmacy to dose Vancomycin consult **AND** vancomycin - pharmacy to dose     Objective:     Vital Signs (Most Recent):  Temp: 98.1 °F (36.7 °C) (03/03/23 0400)  Pulse: 95 (03/03/23 0700)  Resp: 17 (03/03/23 0700)  BP: (!) 117/57 (03/03/23 0700)  SpO2: 98 % (03/03/23 0700)   Vital Signs (24h Range):  Temp:  [96.6 °F (35.9 °C)-98.1 °F (36.7 °C)] 98.1 °F (36.7 °C)  Pulse:  [74-97] 95  Resp:  [17-27] 17  SpO2:  [96 %-100 %] 98 %  BP: (117-167)/(57-76) 117/57  Arterial Line BP: (127-183)/(27-56) 142/43         Physical Exam  Vitals reviewed.   Constitutional:       Appearance: Normal appearance.   Cardiovascular:      Rate and Rhythm: Normal rate and regular rhythm.      Comments: R mono DP and PT   L mono AT   Abdominal:      General: Abdomen is  flat.      Palpations: Abdomen is soft.      Comments: L groin dressing - not soaked, not hematoma.     Musculoskeletal:      Comments: R thigh incision with dressing - not soaked, no hematoma.    Neurological:      Mental Status: She is alert.     Significant Labs:  CBC:   Recent Labs   Lab 03/03/23  0405   WBC 10.18  10.18   RBC 3.49*  3.49*   HGB 8.9*  8.9*   HCT 28.3*  28.3*     284   MCV 81*  81*   MCH 25.5*  25.5*   MCHC 31.4*  31.4*     CMP:   Recent Labs   Lab 03/03/23  0405   *   CALCIUM 8.4*   ALBUMIN 2.4*   PROT 6.1   *   K 4.1   CO2 24      BUN 16   CREATININE 1.0   ALKPHOS 50*   ALT 15   AST 32   BILITOT 2.0*       Significant Diagnostics:  I have reviewed all pertinent imaging results/findings within the past 24 hours.

## 2023-03-03 NOTE — ASSESSMENT & PLAN NOTE
Sepsis element resolved; see above   -Right foot appears to have wet gangrene on exam  -Blood cx  NGTD  - abx as above

## 2023-03-03 NOTE — ASSESSMENT & PLAN NOTE
82-year-old female with a past medical history of severe PAD, hypertension, type 2 diabetes, history of DVT who presents with right foot pain.  She underwent fem fem bypass on 3/2. Right SFA baloon stent placed. She was admitted to SICU for q1hr neurovascular monitoring after large vessel revascularization. Currently q2hr checks, likely step down. OR 3/4 with podiatry for R foot transmetatarsal amputation.    Neuro/Psych:   -- Sedation: none  -- Pain: scheduled tylenol; oxy and dilaudid prn               CV:   -- SP revascularization of R LE sp SFA stenting      - On low intensity heparin drip   -  Q2 hr nvc  -- HTN   - continue home medications (amlodipine, carvedilol)  -- Severe PAD   - aspirin, plavix, statin  -- HDS       Pulm:   -- Goal O2 sat > 90%  -- Wean as able      Renal:  -- BUN/Cr 19 / 0.9  -- UOP 1L in 24 hrs  -- Monitor UOP      FEN / GI:   -- Net +1L/24hr  -- Replace lytes as needed  -- Nutrition: diabetic diet  -- Dced LR @100/hr                            ID:   -- Tm: Afebrile; WBC 11.4  -- On vanc, zosyn  -- No current signs of sepsis, will continue to monitor      Heme/Onc:   -- Hgb 7.6 from 8.9  -- Daily CBC      Endo:   -- Gluc goal 140-180  -- POCT Glucose Q ACHS  -- Continue subQ insulin therapy   --SSI high dose      PPx:   Feeding: diabetic diet  Analgesia/Sedation: controlled on scheduled tylenol, with oxy prn  Thromboembolic prevention: on hep drip  HOB >30: yes  Stress Ulcer ppx: n/a  Glucose control: Critical care goal 140-180 g/dl  Bowel reg:   Invasive Lines/Drains/Airway: [place]  L Radial A line  18G x2  20G x1  Deescalation: dc'ed mIVF, Continue A line for q6hr blood draws.       Dispo/Code Status/Palliative:   -- SICU / Full Code  -- likely step down today if ok to advance NVC to q4

## 2023-03-03 NOTE — ASSESSMENT & PLAN NOTE
82-year-old female with a past medical history of severe PAD, hypertension, type 2 diabetes, history of DVT who presents with right foot pain.  She underwent fem fem bypass on 3/2. Right SFA baloon stent placed. She was admitted to SICU for q1hr neurovascular monitoring after large vessel revascularization. Currently q2hr checks, likely step down.     Neuro/Psych:   -- Sedation: none  -- Pain: scheduled tylenol; oxy and dilaudid prn               CV:   -- SP revascularization of R LE sp SFA stenting      - On low intensity heparin drip   -  Q2 hr nvc  -- HTN   - continue home medications (amlodipine, carvedilol)  -- Severe PAD   - aspirin, plavix, statin  -- HDS       Pulm:   -- Goal O2 sat > 90%  -- Wean as able      Renal:  -- BUN/Cr 16 / 1.0  -- UOP 3L in 24 hrs  -- Monitor UOP  -- [place] for strict I/O      FEN / GI:   -- Net -300ml  -- Replace lytes as needed  -- Nutrition: diabetic diet  -- Dced LR @100/hr                            ID:   -- Tm: Afebrile; WBC 21 up from 8.45  -- On vanc, zosyn  -- No current signs of sepsis, will continue to monitor      Heme/Onc:   -- Hgb 9.6  -- Daily CBC      Endo:   -- Gluc goal 140-180  -- POCT Glucose Q ACHS  -- Continue subQ insulin therapy       PPx:   Feeding: diabetic diet  Analgesia/Sedation: controlled on scheduled tylenol, with oxy prn  Thromboembolic prevention: on hep drip  HOB >30: yes  Stress Ulcer ppx: n/a  Glucose control: Critical care goal 140-180 g/dl  Bowel reg:   Invasive Lines/Drains/Airway: [place]  L Radial A line  18G x2  20G x1  Deescalation: dc'ed mIVF, dc a line      Dispo/Code Status/Palliative:   -- SICU / Full Code  -- likely step down tomorrow morning if ok to advance NVC to q4

## 2023-03-03 NOTE — SUBJECTIVE & OBJECTIVE
Interval History/Significant Events: NAEON. VSS. Continuing Q2hr neurovascular checks. Unable to obtain dorsalis pedis dopplers on LLE, foot warm and non-tender. Vascular aware.     Follow-up For: Procedure(s) (LRB):  CREATION, BYPASS, ARTERIAL, FEMORAL TO FEMORAL, USING GRAFT, LEFT FEMORAL ENDARTERECTOMY, LEFT ILIAC ARTERY STENT PLACEMENT, RIGHT LOWER EXTREMITY ANGIOGRAM AND RIGHT SFA  ANGIOPLASTY (Bilateral)    Post-Operative Day: 1 Day Post-Op    Objective:     Vital Signs (Most Recent):  Temp: 98.1 °F (36.7 °C) (03/03/23 0400)  Pulse: 90 (03/03/23 1330)  Resp: (!) 22 (03/03/23 1330)  BP: (!) 119/59 (03/03/23 1330)  SpO2: 100 % (03/03/23 1330)   Vital Signs (24h Range):  Temp:  [96.6 °F (35.9 °C)-98.1 °F (36.7 °C)] 98.1 °F (36.7 °C)  Pulse:  [74-99] 90  Resp:  [15-31] 22  SpO2:  [91 %-100 %] 100 %  BP: (106-167)/(57-76) 119/59  Arterial Line BP: ()/(27-56) 140/44     Weight: 74 kg (163 lb 2.3 oz)  Body mass index is 28 kg/m².      Intake/Output Summary (Last 24 hours) at 3/3/2023 1339  Last data filed at 3/3/2023 1234  Gross per 24 hour   Intake 3710.91 ml   Output 4610 ml   Net -899.09 ml       Physical Exam  Vitals and nursing note reviewed.   Constitutional:       General: She is not in acute distress.     Appearance: Normal appearance. She is obese.   HENT:      Head: Normocephalic and atraumatic.      Nose: No congestion.      Mouth/Throat:      Mouth: Mucous membranes are moist.   Cardiovascular:      Rate and Rhythm: Normal rate and regular rhythm.      Pulses: Normal pulses.   Pulmonary:      Effort: Pulmonary effort is normal. No respiratory distress.   Abdominal:      General: Abdomen is flat.      Palpations: Abdomen is soft.      Tenderness: There is no abdominal tenderness.   Musculoskeletal:      Right lower leg: No edema.      Left lower leg: No edema.      Comments: R LE dry gangrene present   Skin:     General: Skin is warm and dry.   Neurological:      General: No focal deficit present.       Mental Status: She is alert and oriented to person, place, and time. Mental status is at baseline.   Psychiatric:         Mood and Affect: Mood normal.         Behavior: Behavior normal.     Vents:       Lines/Drains/Airways       Arterial Line  Duration             Arterial Line 03/02/23 1213 Left Radial 1 day              Peripheral Intravenous Line  Duration                  External Jugular IV 03/02/23 1203 1 day         Peripheral IV - Single Lumen 03/01/23 1449 20 G Left Antecubital 1 day         Peripheral IV - Single Lumen 03/02/23 1209 18 G Left Upper Arm 1 day                    Significant Labs:    CBC/Anemia Profile:  Recent Labs   Lab 03/02/23 1935 03/02/23 1939 03/03/23  0405   WBC 20.80* 21.02* 10.18  10.18   HGB 9.5* 9.6* 8.9*  8.9*   HCT 29.8* 29.7* 28.3*  28.3*    301 284  284   MCV 80* 80* 81*  81*   RDW 14.3 14.1 14.2  14.2        Chemistries:  Recent Labs   Lab 03/02/23  0817 03/02/23 1935 03/03/23  0405   * 134* 134*   K 4.2 4.3 4.1    104 103   CO2 25 23 24   BUN 22 18 16   CREATININE 1.2 1.0 1.0   CALCIUM 9.2 8.5* 8.4*   ALBUMIN  --  2.4* 2.4*   PROT  --  6.4 6.1   BILITOT  --  1.4* 2.0*   ALKPHOS  --  52* 50*   ALT  --  12 15   AST  --  24 32   MG 2.1  --   --    PHOS 2.8  --   --        None    Significant Imaging:  I have reviewed all pertinent imaging results/findings within the past 24 hours.  I have reviewed and interpreted all pertinent imaging results/findings within the past 24 hours.

## 2023-03-04 ENCOUNTER — ANESTHESIA (OUTPATIENT)
Dept: SURGERY | Facility: HOSPITAL | Age: 83
DRG: 853 | End: 2023-03-04
Payer: MEDICARE

## 2023-03-04 LAB
ALBUMIN SERPL BCP-MCNC: 2.1 G/DL (ref 3.5–5.2)
ALP SERPL-CCNC: 43 U/L (ref 55–135)
ALT SERPL W/O P-5'-P-CCNC: 15 U/L (ref 10–44)
ANION GAP SERPL CALC-SCNC: 7 MMOL/L (ref 8–16)
APTT BLDCRRT: 44.7 SEC (ref 21–32)
AST SERPL-CCNC: 31 U/L (ref 10–40)
BASOPHILS # BLD AUTO: 0.02 K/UL (ref 0–0.2)
BASOPHILS NFR BLD: 0.2 % (ref 0–1.9)
BILIRUB SERPL-MCNC: 0.6 MG/DL (ref 0.1–1)
BUN SERPL-MCNC: 19 MG/DL (ref 8–23)
CALCIUM SERPL-MCNC: 8.4 MG/DL (ref 8.7–10.5)
CHLORIDE SERPL-SCNC: 100 MMOL/L (ref 95–110)
CO2 SERPL-SCNC: 25 MMOL/L (ref 23–29)
CREAT SERPL-MCNC: 0.9 MG/DL (ref 0.5–1.4)
DIFFERENTIAL METHOD: ABNORMAL
EOSINOPHIL # BLD AUTO: 0.1 K/UL (ref 0–0.5)
EOSINOPHIL NFR BLD: 1.1 % (ref 0–8)
ERYTHROCYTE [DISTWIDTH] IN BLOOD BY AUTOMATED COUNT: 14.5 % (ref 11.5–14.5)
EST. GFR  (NO RACE VARIABLE): >60 ML/MIN/1.73 M^2
GLUCOSE SERPL-MCNC: 208 MG/DL (ref 70–110)
GRAM STN SPEC: NORMAL
GRAM STN SPEC: NORMAL
HCT VFR BLD AUTO: 24.1 % (ref 37–48.5)
HGB BLD-MCNC: 7.6 G/DL (ref 12–16)
IMM GRANULOCYTES # BLD AUTO: 0.06 K/UL (ref 0–0.04)
IMM GRANULOCYTES NFR BLD AUTO: 0.5 % (ref 0–0.5)
LYMPHOCYTES # BLD AUTO: 0.9 K/UL (ref 1–4.8)
LYMPHOCYTES NFR BLD: 7.9 % (ref 18–48)
MCH RBC QN AUTO: 25.7 PG (ref 27–31)
MCHC RBC AUTO-ENTMCNC: 31.5 G/DL (ref 32–36)
MCV RBC AUTO: 81 FL (ref 82–98)
MONOCYTES # BLD AUTO: 0.9 K/UL (ref 0.3–1)
MONOCYTES NFR BLD: 8.1 % (ref 4–15)
NEUTROPHILS # BLD AUTO: 9.4 K/UL (ref 1.8–7.7)
NEUTROPHILS NFR BLD: 82.2 % (ref 38–73)
NRBC BLD-RTO: 0 /100 WBC
PLATELET # BLD AUTO: 225 K/UL (ref 150–450)
PMV BLD AUTO: 10.4 FL (ref 9.2–12.9)
POCT GLUCOSE: 112 MG/DL (ref 70–110)
POCT GLUCOSE: 164 MG/DL (ref 70–110)
POCT GLUCOSE: 199 MG/DL (ref 70–110)
POCT GLUCOSE: 237 MG/DL (ref 70–110)
POCT GLUCOSE: 272 MG/DL (ref 70–110)
POCT GLUCOSE: 272 MG/DL (ref 70–110)
POTASSIUM SERPL-SCNC: 3.6 MMOL/L (ref 3.5–5.1)
POTASSIUM SERPL-SCNC: 3.6 MMOL/L (ref 3.5–5.1)
PROT SERPL-MCNC: 5.8 G/DL (ref 6–8.4)
RBC # BLD AUTO: 2.96 M/UL (ref 4–5.4)
SODIUM SERPL-SCNC: 132 MMOL/L (ref 136–145)
VANCOMYCIN TROUGH SERPL-MCNC: 6.9 UG/ML (ref 10–22)
WBC # BLD AUTO: 11.45 K/UL (ref 3.9–12.7)

## 2023-03-04 PROCEDURE — 63600175 PHARM REV CODE 636 W HCPCS: Performed by: NURSE ANESTHETIST, CERTIFIED REGISTERED

## 2023-03-04 PROCEDURE — 25000003 PHARM REV CODE 250: Performed by: INTERNAL MEDICINE

## 2023-03-04 PROCEDURE — 20600001 HC STEP DOWN PRIVATE ROOM

## 2023-03-04 PROCEDURE — 14040 TIS TRNFR F/C/C/M/N/A/G/H/F: CPT | Mod: 51,,, | Performed by: PODIATRIST

## 2023-03-04 PROCEDURE — 99291 CRITICAL CARE FIRST HOUR: CPT | Mod: ,,, | Performed by: ANESTHESIOLOGY

## 2023-03-04 PROCEDURE — 63600175 PHARM REV CODE 636 W HCPCS: Performed by: EMERGENCY MEDICINE

## 2023-03-04 PROCEDURE — 28805 AMPUTATION THRU METATARSAL: CPT | Mod: RT,,, | Performed by: PODIATRIST

## 2023-03-04 PROCEDURE — 80202 ASSAY OF VANCOMYCIN: CPT | Performed by: HOSPITALIST

## 2023-03-04 PROCEDURE — D9220A PRA ANESTHESIA: Mod: ANES,,, | Performed by: ANESTHESIOLOGY

## 2023-03-04 PROCEDURE — 88305 TISSUE EXAM BY PATHOLOGIST: CPT | Performed by: PATHOLOGY

## 2023-03-04 PROCEDURE — 25000003 PHARM REV CODE 250: Performed by: STUDENT IN AN ORGANIZED HEALTH CARE EDUCATION/TRAINING PROGRAM

## 2023-03-04 PROCEDURE — 36000706: Performed by: PODIATRIST

## 2023-03-04 PROCEDURE — 28805 PR AMPUTATION FOOT,TRANSMETATARSAL: ICD-10-PCS | Mod: RT,,, | Performed by: PODIATRIST

## 2023-03-04 PROCEDURE — D9220A PRA ANESTHESIA: ICD-10-PCS | Mod: CRNA,,, | Performed by: NURSE ANESTHETIST, CERTIFIED REGISTERED

## 2023-03-04 PROCEDURE — 51798 US URINE CAPACITY MEASURE: CPT

## 2023-03-04 PROCEDURE — 88307 PR  SURG PATH,LEVEL V: ICD-10-PCS | Mod: 26,,, | Performed by: PATHOLOGY

## 2023-03-04 PROCEDURE — 99291 PR CRITICAL CARE, E/M 30-74 MINUTES: ICD-10-PCS | Mod: ,,, | Performed by: ANESTHESIOLOGY

## 2023-03-04 PROCEDURE — 88311 DECALCIFY TISSUE: CPT | Mod: 26,,, | Performed by: PATHOLOGY

## 2023-03-04 PROCEDURE — 85025 COMPLETE CBC W/AUTO DIFF WBC: CPT | Performed by: STUDENT IN AN ORGANIZED HEALTH CARE EDUCATION/TRAINING PROGRAM

## 2023-03-04 PROCEDURE — 14040 PR ADJ TISS XFER HEAD,FAC,HAND <10 SQCM: ICD-10-PCS | Mod: 51,,, | Performed by: PODIATRIST

## 2023-03-04 PROCEDURE — 88307 TISSUE EXAM BY PATHOLOGIST: CPT | Mod: 26,,, | Performed by: PATHOLOGY

## 2023-03-04 PROCEDURE — 37000008 HC ANESTHESIA 1ST 15 MINUTES: Performed by: PODIATRIST

## 2023-03-04 PROCEDURE — 88307 TISSUE EXAM BY PATHOLOGIST: CPT | Performed by: PATHOLOGY

## 2023-03-04 PROCEDURE — 36000707: Performed by: PODIATRIST

## 2023-03-04 PROCEDURE — 80053 COMPREHEN METABOLIC PANEL: CPT | Performed by: STUDENT IN AN ORGANIZED HEALTH CARE EDUCATION/TRAINING PROGRAM

## 2023-03-04 PROCEDURE — 25000003 PHARM REV CODE 250: Performed by: NURSE ANESTHETIST, CERTIFIED REGISTERED

## 2023-03-04 PROCEDURE — 94761 N-INVAS EAR/PLS OXIMETRY MLT: CPT

## 2023-03-04 PROCEDURE — 87075 CULTR BACTERIA EXCEPT BLOOD: CPT | Performed by: PODIATRIST

## 2023-03-04 PROCEDURE — 84132 ASSAY OF SERUM POTASSIUM: CPT

## 2023-03-04 PROCEDURE — 63600175 PHARM REV CODE 636 W HCPCS: Performed by: STUDENT IN AN ORGANIZED HEALTH CARE EDUCATION/TRAINING PROGRAM

## 2023-03-04 PROCEDURE — 25000003 PHARM REV CODE 250: Performed by: EMERGENCY MEDICINE

## 2023-03-04 PROCEDURE — 99024 POSTOP FOLLOW-UP VISIT: CPT | Mod: ,,, | Performed by: SURGERY

## 2023-03-04 PROCEDURE — 88311 DECALCIFY TISSUE: CPT | Performed by: PATHOLOGY

## 2023-03-04 PROCEDURE — 88305 TISSUE EXAM BY PATHOLOGIST: CPT | Mod: 26,,, | Performed by: PATHOLOGY

## 2023-03-04 PROCEDURE — 27000221 HC OXYGEN, UP TO 24 HOURS

## 2023-03-04 PROCEDURE — P9021 RED BLOOD CELLS UNIT: HCPCS | Performed by: ANESTHESIOLOGY

## 2023-03-04 PROCEDURE — 87102 FUNGUS ISOLATION CULTURE: CPT | Performed by: PODIATRIST

## 2023-03-04 PROCEDURE — 36430 TRANSFUSION BLD/BLD COMPNT: CPT

## 2023-03-04 PROCEDURE — D9220A PRA ANESTHESIA: ICD-10-PCS | Mod: ANES,,, | Performed by: ANESTHESIOLOGY

## 2023-03-04 PROCEDURE — 88311 PR  DECALCIFY TISSUE: ICD-10-PCS | Mod: 26,,, | Performed by: PATHOLOGY

## 2023-03-04 PROCEDURE — 99900035 HC TECH TIME PER 15 MIN (STAT)

## 2023-03-04 PROCEDURE — 99024 PR POST-OP FOLLOW-UP VISIT: ICD-10-PCS | Mod: ,,, | Performed by: SURGERY

## 2023-03-04 PROCEDURE — 85730 THROMBOPLASTIN TIME PARTIAL: CPT | Performed by: STUDENT IN AN ORGANIZED HEALTH CARE EDUCATION/TRAINING PROGRAM

## 2023-03-04 PROCEDURE — D9220A PRA ANESTHESIA: Mod: CRNA,,, | Performed by: NURSE ANESTHETIST, CERTIFIED REGISTERED

## 2023-03-04 PROCEDURE — 25000003 PHARM REV CODE 250: Performed by: PODIATRIST

## 2023-03-04 PROCEDURE — 25000003 PHARM REV CODE 250

## 2023-03-04 PROCEDURE — 20000000 HC ICU ROOM

## 2023-03-04 PROCEDURE — 94799 UNLISTED PULMONARY SVC/PX: CPT

## 2023-03-04 PROCEDURE — 88305 TISSUE EXAM BY PATHOLOGIST: ICD-10-PCS | Mod: 26,,, | Performed by: PATHOLOGY

## 2023-03-04 PROCEDURE — 37000009 HC ANESTHESIA EA ADD 15 MINS: Performed by: PODIATRIST

## 2023-03-04 PROCEDURE — 87205 SMEAR GRAM STAIN: CPT | Performed by: PODIATRIST

## 2023-03-04 PROCEDURE — 87070 CULTURE OTHR SPECIMN AEROBIC: CPT | Performed by: PODIATRIST

## 2023-03-04 RX ORDER — LIDOCAINE HYDROCHLORIDE 5 MG/ML
INJECTION, SOLUTION INFILTRATION; INTRAVENOUS
Status: DISCONTINUED | OUTPATIENT
Start: 2023-03-04 | End: 2023-03-04 | Stop reason: HOSPADM

## 2023-03-04 RX ORDER — HYDROCODONE BITARTRATE AND ACETAMINOPHEN 500; 5 MG/1; MG/1
TABLET ORAL
Status: DISCONTINUED | OUTPATIENT
Start: 2023-03-04 | End: 2023-03-07

## 2023-03-04 RX ORDER — MIDAZOLAM HYDROCHLORIDE 1 MG/ML
INJECTION INTRAMUSCULAR; INTRAVENOUS
Status: DISCONTINUED | OUTPATIENT
Start: 2023-03-04 | End: 2023-03-04

## 2023-03-04 RX ORDER — SODIUM CHLORIDE 0.9 % (FLUSH) 0.9 %
10 SYRINGE (ML) INJECTION
Status: DISCONTINUED | OUTPATIENT
Start: 2023-03-04 | End: 2023-03-09

## 2023-03-04 RX ORDER — PHENYLEPHRINE HYDROCHLORIDE 10 MG/ML
INJECTION INTRAVENOUS
Status: DISCONTINUED | OUTPATIENT
Start: 2023-03-04 | End: 2023-03-04

## 2023-03-04 RX ORDER — BUPIVACAINE HYDROCHLORIDE 5 MG/ML
INJECTION, SOLUTION EPIDURAL; INTRACAUDAL
Status: DISCONTINUED | OUTPATIENT
Start: 2023-03-04 | End: 2023-03-04 | Stop reason: HOSPADM

## 2023-03-04 RX ORDER — FENTANYL CITRATE 50 UG/ML
INJECTION, SOLUTION INTRAMUSCULAR; INTRAVENOUS
Status: DISCONTINUED | OUTPATIENT
Start: 2023-03-04 | End: 2023-03-04

## 2023-03-04 RX ORDER — FENTANYL CITRATE 50 UG/ML
25 INJECTION, SOLUTION INTRAMUSCULAR; INTRAVENOUS EVERY 5 MIN PRN
Status: CANCELLED | OUTPATIENT
Start: 2023-03-04

## 2023-03-04 RX ORDER — PROPOFOL 10 MG/ML
VIAL (ML) INTRAVENOUS
Status: DISCONTINUED | OUTPATIENT
Start: 2023-03-04 | End: 2023-03-04

## 2023-03-04 RX ORDER — ONDANSETRON 2 MG/ML
4 INJECTION INTRAMUSCULAR; INTRAVENOUS DAILY PRN
Status: CANCELLED | OUTPATIENT
Start: 2023-03-04

## 2023-03-04 RX ORDER — DEXMEDETOMIDINE HYDROCHLORIDE 100 UG/ML
INJECTION, SOLUTION INTRAVENOUS
Status: DISCONTINUED | OUTPATIENT
Start: 2023-03-04 | End: 2023-03-04

## 2023-03-04 RX ADMIN — ACETAMINOPHEN 1000 MG: 500 TABLET ORAL at 03:03

## 2023-03-04 RX ADMIN — MIDAZOLAM HYDROCHLORIDE 1 MG: 1 INJECTION, SOLUTION INTRAMUSCULAR; INTRAVENOUS at 10:03

## 2023-03-04 RX ADMIN — HYDROMORPHONE HYDROCHLORIDE 0.5 MG: 1 INJECTION, SOLUTION INTRAMUSCULAR; INTRAVENOUS; SUBCUTANEOUS at 05:03

## 2023-03-04 RX ADMIN — INSULIN ASPART 4 UNITS: 100 INJECTION, SOLUTION INTRAVENOUS; SUBCUTANEOUS at 08:03

## 2023-03-04 RX ADMIN — PHENYLEPHRINE HYDROCHLORIDE 100 MCG: 10 INJECTION INTRAVENOUS at 11:03

## 2023-03-04 RX ADMIN — FENTANYL CITRATE 25 MCG: 0.05 INJECTION, SOLUTION INTRAMUSCULAR; INTRAVENOUS at 10:03

## 2023-03-04 RX ADMIN — DEXMEDETOMIDINE HYDROCHLORIDE 5 MCG: 100 INJECTION, SOLUTION INTRAVENOUS at 10:03

## 2023-03-04 RX ADMIN — HYDROMORPHONE HYDROCHLORIDE 0.5 MG: 1 INJECTION, SOLUTION INTRAMUSCULAR; INTRAVENOUS; SUBCUTANEOUS at 10:03

## 2023-03-04 RX ADMIN — ATORVASTATIN CALCIUM 40 MG: 40 TABLET, FILM COATED ORAL at 08:03

## 2023-03-04 RX ADMIN — ACETAMINOPHEN 1000 MG: 500 TABLET ORAL at 10:03

## 2023-03-04 RX ADMIN — POTASSIUM BICARBONATE 50 MEQ: 978 TABLET, EFFERVESCENT ORAL at 05:03

## 2023-03-04 RX ADMIN — VANCOMYCIN HYDROCHLORIDE 1000 MG: 1 INJECTION, POWDER, LYOPHILIZED, FOR SOLUTION INTRAVENOUS at 02:03

## 2023-03-04 RX ADMIN — OXYCODONE HYDROCHLORIDE 5 MG: 5 TABLET ORAL at 08:03

## 2023-03-04 RX ADMIN — CILOSTAZOL 50 MG: 50 TABLET ORAL at 03:03

## 2023-03-04 RX ADMIN — INSULIN ASPART 3 UNITS: 100 INJECTION, SOLUTION INTRAVENOUS; SUBCUTANEOUS at 08:03

## 2023-03-04 RX ADMIN — PHENYLEPHRINE HYDROCHLORIDE 200 MCG: 10 INJECTION INTRAVENOUS at 10:03

## 2023-03-04 RX ADMIN — PROPOFOL 20 MG: 10 INJECTION, EMULSION INTRAVENOUS at 10:03

## 2023-03-04 RX ADMIN — CILOSTAZOL 50 MG: 50 TABLET ORAL at 08:03

## 2023-03-04 RX ADMIN — GABAPENTIN 100 MG: 100 CAPSULE ORAL at 02:03

## 2023-03-04 RX ADMIN — PIPERACILLIN SODIUM AND TAZOBACTAM SODIUM 4.5 G: 4; .5 INJECTION, POWDER, FOR SOLUTION INTRAVENOUS at 08:03

## 2023-03-04 RX ADMIN — OXYCODONE HYDROCHLORIDE 5 MG: 5 TABLET ORAL at 03:03

## 2023-03-04 RX ADMIN — AMLODIPINE BESYLATE 10 MG: 10 TABLET ORAL at 09:03

## 2023-03-04 RX ADMIN — PIPERACILLIN SODIUM AND TAZOBACTAM SODIUM 4.5 G: 4; .5 INJECTION, POWDER, FOR SOLUTION INTRAVENOUS at 04:03

## 2023-03-04 RX ADMIN — ASPIRIN 81 MG: 81 TABLET, COATED ORAL at 09:03

## 2023-03-04 RX ADMIN — INSULIN ASPART 9 UNITS: 100 INJECTION, SOLUTION INTRAVENOUS; SUBCUTANEOUS at 05:03

## 2023-03-04 RX ADMIN — PHENYLEPHRINE HYDROCHLORIDE 200 MCG: 10 INJECTION INTRAVENOUS at 11:03

## 2023-03-04 RX ADMIN — GABAPENTIN 100 MG: 100 CAPSULE ORAL at 09:03

## 2023-03-04 RX ADMIN — CARVEDILOL 12.5 MG: 12.5 TABLET, FILM COATED ORAL at 09:03

## 2023-03-04 RX ADMIN — CARVEDILOL 12.5 MG: 12.5 TABLET, FILM COATED ORAL at 08:03

## 2023-03-04 RX ADMIN — GABAPENTIN 100 MG: 100 CAPSULE ORAL at 08:03

## 2023-03-04 RX ADMIN — OXYCODONE HYDROCHLORIDE 5 MG: 5 TABLET ORAL at 01:03

## 2023-03-04 RX ADMIN — Medication 6 MG: at 10:03

## 2023-03-04 RX ADMIN — HEPARIN SODIUM 15 UNITS/KG/HR: 10000 INJECTION, SOLUTION INTRAVENOUS at 03:03

## 2023-03-04 RX ADMIN — PIPERACILLIN SODIUM AND TAZOBACTAM SODIUM 4.5 G: 4; .5 INJECTION, POWDER, FOR SOLUTION INTRAVENOUS at 02:03

## 2023-03-04 NOTE — ASSESSMENT & PLAN NOTE
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 Vascular did bypass procedure for RLE on 3/2  - Plan for Right foot transmetatarsal amputation tomorrow morning 3/4  - Long discussion with patient regarding the procedure in detail, Patient understands all risks, potential complications, and alternatives, including, but not limited to those listed on the consent form.  No guarantees given or implied as to outcome. Informed verbal and written consent was obtained. Patient verbalized understanding and would still like to continue with surgery. Consent form discussed, signed and witnessed appropriately. Patient was marked.  - Patient to be NPO tonight at midnight    - Betadine paint applied to patient's right foot today   - Recommend heel offloading boots while patient is laying in bed

## 2023-03-04 NOTE — PROGRESS NOTES
[facility]  Critical Care - Surgery  Progress Note    Patient Name: Nelsy Marino  MRN: [id number]  Admission Date: 2/27/2023  Hospital Length of Stay: 5 days  Code Status: Full Code  Attending Provider: Danny Dunbar MD  Primary Care Provider: Juan F Garay   Principal Problem: Critical limb ischemia with history of revascularization of same extremity    Subjective:     Hospital/ICU Course:  No notes on file    Interval History/Significant Events: NAEON. HDS. Some difficulty in obtaining LLE doppler overnight, however able to obtain them this AM.     Follow-up For: Procedure(s) (LRB):  CREATION, BYPASS, ARTERIAL, FEMORAL TO FEMORAL, USING GRAFT, LEFT FEMORAL ENDARTERECTOMY, LEFT ILIAC ARTERY STENT PLACEMENT, RIGHT LOWER EXTREMITY ANGIOGRAM AND RIGHT SFA  ANGIOPLASTY (Bilateral)    Post-Operative Day: 2 Days Post-Op    Objective:     Vital Signs (Most Recent):  Temp: 98.7 °F (37.1 °C) (03/04/23 0300)  Pulse: 84 (03/04/23 0600)  Resp: 20 (03/04/23 0600)  BP: (!) 148/86 (03/04/23 0600)  SpO2: 99 % (03/04/23 0600)   Vital Signs (24h Range):  Temp:  [98.7 °F (37.1 °C)-99 °F (37.2 °C)] 98.7 °F (37.1 °C)  Pulse:  [72-99] 84  Resp:  [15-46] 20  SpO2:  [70 %-100 %] 99 %  BP: ()/() 148/86  Arterial Line BP: ()/(30-51) 158/43     Weight: 74 kg (163 lb 2.3 oz)  Body mass index is 28 kg/m².      Intake/Output Summary (Last 24 hours) at 3/4/2023 0625  Last data filed at 3/4/2023 0600  Gross per 24 hour   Intake 2037.48 ml   Output 1000 ml   Net 1037.48 ml       Physical Exam  Vitals and nursing note reviewed.   Constitutional:       General: She is not in acute distress.     Appearance: Normal appearance. She is obese.   HENT:      Head: Normocephalic and atraumatic.      Nose: No congestion.      Mouth/Throat:      Mouth: Mucous membranes are moist.   Cardiovascular:      Rate and Rhythm: Normal rate and regular rhythm.      Pulses: Normal pulses.      Comments: Monophasic L  PT. Monophasic R DP  Pulmonary:      Effort: Pulmonary effort is normal. No respiratory distress.   Abdominal:      General: Abdomen is flat.      Palpations: Abdomen is soft.      Tenderness: There is no abdominal tenderness.      Comments: Possible hematoma formation underlying R lower abdomen and R upper thigh. Skin tough and tender to palpation, however improving.    Musculoskeletal:      Right lower leg: No edema.      Left lower leg: No edema.      Comments: R LE dry gangrene present. L foot cooler than R, patient mobility in toes very limited.    Skin:     General: Skin is warm and dry.   Neurological:      General: No focal deficit present.      Mental Status: She is alert and oriented to person, place, and time. Mental status is at baseline.   Psychiatric:         Mood and Affect: Mood normal.         Behavior: Behavior normal.         Vents:  Oxygen Concentration (%): 1 (03/04/23 0100)    Lines/Drains/Airways       Drain  Duration             Female External Urinary Catheter 03/03/23 2229 <1 day              Arterial Line  Duration             Arterial Line 03/02/23 1213 Left Radial 1 day              Peripheral Intravenous Line  Duration                  Peripheral IV - Single Lumen 03/01/23 1449 20 G Left Antecubital 2 days         External Jugular IV 03/02/23 1203 1 day         Peripheral IV - Single Lumen 03/02/23 1209 18 G Left Upper Arm 1 day         Peripheral IV - Single Lumen 03/03/23 1815 20 G Anterior;Distal;Left Upper Arm <1 day                    Significant Labs:    CBC/Anemia Profile:  Recent Labs   Lab 03/02/23 1939 03/03/23  0405 03/04/23  0415   WBC 21.02* 10.18  10.18 11.45   HGB 9.6* 8.9*  8.9* 7.6*   HCT 29.7* 28.3*  28.3* 24.1*    284  284 225   MCV 80* 81*  81* 81*   RDW 14.1 14.2  14.2 14.5        Chemistries:  Recent Labs   Lab 03/02/23  0817 03/02/23 1935 03/03/23  0405 03/04/23  0415   * 134* 134* 132*   K 4.2 4.3 4.1 3.6    104 103 100   CO2 25 23 24  25   BUN 22 18 16 19   CREATININE 1.2 1.0 1.0 0.9   CALCIUM 9.2 8.5* 8.4* 8.4*   ALBUMIN  --  2.4* 2.4* 2.1*   PROT  --  6.4 6.1 5.8*   BILITOT  --  1.4* 2.0* 0.6   ALKPHOS  --  52* 50* 43*   ALT  --  12 15 15   AST  --  24 32 31   MG 2.1  --   --   --    PHOS 2.8  --   --   --        All pertinent labs within the past 24 hours have been reviewed.    Significant Imaging:  I have reviewed all pertinent imaging results/findings within the past 24 hours.  I have reviewed and interpreted all pertinent imaging results/findings within the past 24 hours.  Assessment/Plan:     Cardiac/Vascular  * Critical limb ischemia with history of revascularization of same extremity  82-year-old female with a past medical history of severe PAD, hypertension, type 2 diabetes, history of DVT who presents with right foot pain.  She underwent fem fem bypass on 3/2. Right SFA baloon stent placed. She was admitted to SICU for q1hr neurovascular monitoring after large vessel revascularization. Currently q2hr checks, likely step down. OR 3/4 with podiatry for R foot transmetatarsal amputation.    Neuro/Psych:   -- Sedation: none  -- Pain: scheduled tylenol; oxy and dilaudid prn               CV:   -- SP revascularization of R LE sp SFA stenting      - On low intensity heparin drip   -  Q2 hr nvc  -- HTN   - continue home medications (amlodipine, carvedilol)  -- Severe PAD   - aspirin, plavix, statin  -- HDS       Pulm:   -- Goal O2 sat > 90%  -- Wean as able      Renal:  -- BUN/Cr 19 / 0.9  -- UOP 1L in 24 hrs  -- Monitor UOP      FEN / GI:   -- Net +1L/24hr  -- Replace lytes as needed  -- Nutrition: diabetic diet  -- Dced LR @100/hr                            ID:   -- Tm: Afebrile; WBC 11.4  -- On vanc, zosyn  -- No current signs of sepsis, will continue to monitor      Heme/Onc:   -- Hgb 7.6 from 8.9  --1 uPRBCs ordered  -- Daily CBC      Endo:   -- Gluc goal 140-180  -- POCT Glucose Q ACHS  -- Continue subQ insulin therapy   --SSI high  dose      PPx:   Feeding: diabetic diet  Analgesia/Sedation: controlled on scheduled tylenol, with oxy prn  Thromboembolic prevention: on hep drip  HOB >30: yes  Stress Ulcer ppx: n/a  Glucose control: Critical care goal 140-180 g/dl  Bowel reg:   Invasive Lines/Drains/Airway: [place]  L Radial A line  18G x2  20G x1  Deescalation: dc'ed mIVF, Continue A line for q6hr blood draws.       Dispo/Code Status/Palliative:   -- SICU / Full Code           Critical care was time spent personally by me on the following activities: development of treatment plan with patient or surrogate and bedside caregivers, discussions with consultants, evaluation of patient's response to treatment, examination of patient, ordering and performing treatments and interventions, ordering and review of laboratory studies, ordering and review of radiographic studies, pulse oximetry, re-evaluation of patient's condition.  This critical care time did not overlap with that of any other provider or involve time for any procedures.     AMEYA PEARSON MD  Critical Care - Surgery  [facility]

## 2023-03-04 NOTE — PROGRESS NOTES
Pharmacokinetic Assessment Follow Up: IV Vancomycin    Vancomycin serum concentration assessment(s):    The trough level was drawn correctly and can be used to guide therapy at this time. The measurement is below the desired definitive target range of 10 to 20 mcg/mL.    Vancomycin Regimen Plan:    Change regimen to Vancomycin 1250 mg IV every 24 hours with next serum trough concentration measured at 01:30 prior to 3rd dose on 03/05/2023    Drug levels (last 3 results):  Recent Labs   Lab Result Units 03/01/23  2051 03/04/23  0127   Vancomycin, Random ug/mL 10.1  --    Vancomycin-Trough ug/mL  --  6.9*       Pharmacy will continue to follow and monitor vancomycin.    Please contact pharmacy at extension [phone] for questions regarding this assessment.    Thank you for the consult,   Jude Fields       Patient brief summary:  Nelsy Marino is a 82 y.o. female initiated on antimicrobial therapy with IV Vancomycin for treatment of skin & soft tissue infection    Drug Allergies:   Review of patient's allergies indicates:   Allergen Reactions    Motrin [ibuprofen] Itching       Actual Body Weight:   74kg      Renal Function:   Estimated Creatinine Clearance: 42.7 mL/min (based on SCr of 1 mg/dL).,     Dialysis Method (if applicable):  N/A    CBC (last 72 hours):  Recent Labs   Lab Result Units 03/01/23  0938 03/02/23  0817 03/02/23  1935 03/02/23  1939 03/03/23  0405   WBC K/uL 8.45 7.74 20.80* 21.02* 10.18  10.18   Hemoglobin g/dL 9.3* 9.5* 9.5* 9.6* 8.9*  8.9*   Hematocrit % 30.3* 31.5* 29.8* 29.7* 28.3*  28.3*   Platelets K/uL 381 363 303 301 284  284   Gran % % 73.5* 77.0* 89.7* 89.3* 84.5*  84.5*   Lymph % % 15.9* 14.1* 3.9* 4.1* 8.3*  8.3*   Mono % % 6.5 5.7 4.2 4.1 5.8  5.8   Eosinophil % % 3.7 2.7 0.8 0.7 1.0  1.0   Basophil % % 0.2 0.1 0.1 0.1 0.1  0.1   Differential Method  Automated Automated Automated Automated Automated  Automated       Metabolic Panel (last 72 hours):  Recent Labs   Lab Result Units  03/01/23  0606 03/02/23  0817 03/02/23  1935 03/03/23  0405   Sodium mmol/L 136 132* 134* 134*   Potassium mmol/L 4.3 4.2 4.3 4.1   Chloride mmol/L 101 100 104 103   CO2 mmol/L 24 25 23 24   Glucose mg/dL 112* 241* 248* 252*   BUN mg/dL 26* 22 18 16   Creatinine mg/dL 1.6* 1.2 1.0 1.0   Albumin g/dL  --   --  2.4* 2.4*   Total Bilirubin mg/dL  --   --  1.4* 2.0*   Alkaline Phosphatase U/L  --   --  52* 50*   AST U/L  --   --  24 32   ALT U/L  --   --  12 15   Magnesium mg/dL 2.3 2.1  --   --    Phosphorus mg/dL 4.1 2.8  --   --        Vancomycin Administrations:  vancomycin given in the last 96 hours                     vancomycin (VANCOCIN) 1,000 mg in dextrose 5 % (D5W) 250 mL IVPB (Vial-Mate) (mg) 1,000 mg New Bag 03/04/23 0233      Restarted 03/03/23 0325     1,000 mg New Bag  0317     1,000 mg New Bag 03/02/23 0220     1,000 mg New Bag 02/28/23 2113                    Microbiologic Results:  Microbiology Results (last 7 days)       Procedure Component Value Units Date/Time    Blood culture #2 **CANNOT BE ORDERED STAT** [[id number]] Collected: 02/27/23 1923    Order Status: Completed Specimen: Blood from Peripheral, Antecubital, Right Updated: 03/03/23 2103     Blood Culture, Routine No Growth after 4 days.    Blood culture #1 **CANNOT BE ORDERED STAT** [[id number]] Collected: 02/27/23 1428    Order Status: Completed Specimen: Blood from Peripheral, Antecubital, Left Updated: 03/03/23 1503     Blood Culture, Routine No Growth after 4 days.

## 2023-03-04 NOTE — NURSING
[place] Catheter removed at 1210 per LDAs, no urine output since admission to SICU. Bladder scan: 54 ml in bladder. SICU on call Dr. Stevens, aware. See orders/MAR.    Also pt c/o lower abd pain seen by Dr. Stevens, some bruising noted. Pt also seen by Dr. Dunbar. Pt examined and small amount of bruising from procedure and is to be expected. Asked about hardened area in right groin, is said to be scar tissue d/t previous procedures. Will continue to monitor bruising and give meds for pain control as appropriate.      1745 Per Dr. Dunbar pt's blood gluecose needs to be <180, passed info on to Dr. Stevens. See orders/MAR.

## 2023-03-04 NOTE — NURSING
Pt arrived back to SICU room 41057 from OR. Pt on 2L NC, sating 99%. BP WNL. Pt very drowsy, bur arousable, moving all extremities and following commands. Unable to doppler R foot pulses due to dsg present, popliteal pulse intact via doppler. Heparin gtt infusing. Dr. Casillas notified patient's arrival and of assessment findings. No new orders received.

## 2023-03-04 NOTE — ASSESSMENT & PLAN NOTE
Nelsy Marino is a 82 y.o. lady with severe PAD    - TMA today with podiatry  - Q2H neurovasc checks  - 1U RBC  - poss lasix later after surgery   - NPO, ok for diet after OR  - Continue Heparin gtt  - ASA/plavix/Statin  - Strict I/O  - PT/OT  - Podiatry following  - anticipate stepdown tomorrow

## 2023-03-04 NOTE — BRIEF OP NOTE
[facility]  Brief Operative Note    SUMMARY     Surgery Date: 3/4/2023     Surgeon(s) and Role:     * Benjamin Godfrey DPM - Primary     * Danisha Hanley DPM - Resident - Assisting        Pre-op Diagnosis:  Dry gangrene [I96]    Post-op Diagnosis:  Post-Op Diagnosis Codes:     * Dry gangrene [I96]    Procedure(s) (LRB):  AMPUTATION, FOOT, TRANSMETATARSAL (Right)    Anesthesia: Local MAC    Operative Findings: Right transmetatarsal amputation     Estimated Blood Loss: 9ml             Specimens:   Specimen (24h ago, onward)       Start     Ordered    03/04/23 1139  Specimen to Pathology, Surgery Other (Podiatry)  Once        Comments: Pre-op Diagnosis: Dry gangrene [I96]Procedure(s):AMPUTATION, FOOT, TRANSMETATARSAL Number of specimens: 2Name of specimens: 1) R Foot Transmetatarsal - Permanent                                  2) 3rd Metatarsal - Permanent     References:    Click here for ordering Quick Tip   Question Answer Comment   Procedure Type: Other Podiatry   Which provider would you like to cc? BENJAMIN GODFREY    Release to patient Immediate        03/04/23 1140                  Danisha Hanley DPM PGY-1  Podiatric Medicine & Surgery  [facility]  Secure Chat Preferred  Mobile: [phone]  Pager: [phone]

## 2023-03-04 NOTE — NURSING
Dr Casillas notified of Arterial line reading 99/35 with MAPs in the 50s with dampened waveform. Cuff pressure reading SBP 120s with MAPs in the 70s-80s. Ordered to d/c arterial line and to go by cuff pressures.

## 2023-03-04 NOTE — HPI
82-year-old female with a past medical history of severe PAD, hypertension, type 2 diabetes, history of DVT who presents with right foot pain.  She underwent fem fem bypass on 3/2. Podiatry was consulted for right forefoot dry gangrene. Patient admits to pain to her leg b/l. Patient admits her pain is worse on her right side.Patient admits her toes have been discolored like this for a while now. Patient denies any fever chills nausea or vomiting. Patient is stable at time of this visit. Patient has no other pedal complaints at this time.

## 2023-03-04 NOTE — SUBJECTIVE & OBJECTIVE
Scheduled Meds:   acetaminophen  1,000 mg Oral Q8H    amLODIPine  10 mg Oral Daily    aspirin  81 mg Oral Daily    atorvastatin  40 mg Oral QHS    carvediloL  12.5 mg Oral BID    cilostazoL  50 mg Oral BID    gabapentin  100 mg Oral TID    insulin detemir U-100  15 Units Subcutaneous BID    losartan  100 mg Oral Daily    piperacillin-tazobactam (ZOSYN) IVPB  4.5 g Intravenous Q8H    polyethylene glycol  17 g Oral Daily    vancomycin (VANCOCIN) IVPB  1,000 mg Intravenous Q24H     Continuous Infusions:   heparin (porcine) in D5W 15 Units/kg/hr (03/03/23 1800)     PRN Meds:sodium chloride, albuterol-ipratropium, bisacodyL, dextrose 10%, dextrose 10%, dextrose 10%, dextrose 10%, dextrose, dextrose, glucagon (human recombinant), glucagon (human recombinant), heparin (PORCINE), heparin (PORCINE), HYDROmorphone, insulin aspart U-100, magnesium oxide, magnesium oxide, melatonin, naloxone, ondansetron, oxyCODONE, potassium bicarbonate, potassium bicarbonate, potassium bicarbonate, potassium, sodium phosphates, potassium, sodium phosphates, potassium, sodium phosphates, prochlorperazine, simethicone, sodium chloride 0.9%, sodium chloride 0.9%, Pharmacy to dose Vancomycin consult **AND** vancomycin - pharmacy to dose    Review of patient's allergies indicates:   Allergen Reactions    Motrin [ibuprofen] Itching    Zosyn [piperacillin-tazobactam] Itching        Past Medical History:   Diagnosis Date    CHF (congestive heart failure)     Diabetes mellitus     DVT (deep venous thrombosis)     Hypertension     Miscarriage      Past Surgical History:   Procedure Laterality Date     femoral vascular surgery       ANGIOGRAPHY OF LOWER EXTREMITY Right 2/23/2023    Procedure: Angiogram Extremity Unilateral;  Surgeon: Danny Dunbar MD;  Location: [facility];  Service: Vascular;  Laterality: Right;  RN PHONE PREOP 2/20/2023----CK CONSENT    CREATION OF ILIOFEMORAL ARTERY BYPASS Right 3/20/2019    Procedure: CREATION, BYPASS, ARTERIAL,  ILIAC TO FEMORAL, RIGHT LOWER EXTREMITY, RIGHT PROFUNDAPLASTY;  Surgeon: Danny Dunbar MD;  Location: [facility] OR;  Service: Vascular;  Laterality: Right;  11:00AM START PER ANNE RICHARDS PREOP 3/13/2019  ACT'S, CELL SAVER, GRAFTS, BOOK WATER---NEED H/P  CONSENT IN AM------HGBA1C    HYSTERECTOMY      ?unsure cervix present    INCISION AND DRAINAGE Right 2019    Procedure: Incision and Drainage - R groin washout, possible muscle flap;  Surgeon: Danny Dunbar MD;  [facility];  Service: Vascular;  Laterality: Right;  groin washout    INCISION AND DRAINAGE OF GROIN Right 5/3/2019    Procedure: Incision and Drainage R groin;  Surgeon: Danny Dunbar MD;  Location: [facility] OR [address] [place] [address];  Service: Vascular;  Laterality: Right;       Family History       Problem Relation (Age of Onset)    Diabetes Father    Hypertension Father          Tobacco Use    Smoking status: Former     Types: Cigarettes     Quit date: 1980     Years since quittin.1    Smokeless tobacco: Never   Substance and Sexual Activity    Alcohol use: No    Drug use: No    Sexual activity: Never     Review of Systems   Constitutional:  Negative for chills and fever.   HENT:  Negative for hearing loss and trouble swallowing.    Eyes:  Negative for discharge and visual disturbance.   Respiratory:  Negative for chest tightness and shortness of breath.    Cardiovascular:  Negative for chest pain and palpitations.   Gastrointestinal:  Negative for abdominal distention, abdominal pain, nausea and vomiting.   Genitourinary:  Negative for difficulty urinating and hematuria.   Musculoskeletal:  Negative for arthralgias and back pain.   Skin:  Positive for color change and wound. Negative for rash.   Neurological:  Negative for dizziness and headaches.   Objective:     Vital Signs (Most Recent):  Temp: 98.8 °F (37.1 °C) (23 1500)  Pulse: 83 (23 184)  Resp: (!) 27 (23)  BP: 133/60 (23 1830)  SpO2: 100 %  (03/03/23 1845)   Vital Signs (24h Range):  Temp:  [96.6 °F (35.9 °C)-99 °F (37.2 °C)] 98.8 °F (37.1 °C)  Pulse:  [74-99] 83  Resp:  [15-46] 27  SpO2:  [70 %-100 %] 100 %  BP: ()/(44-72) 133/60  Arterial Line BP: ()/(30-56) 120/36     Weight: 74 kg (163 lb 2.3 oz)  Body mass index is 28 kg/m².    Foot Exam    Right Foot/Ankle     Inspection and Palpation  Skin Exam: skin changes and ulcer;     Neurovascular  Dorsalis pedis: doppler  Posterior tibial: doppler  Saphenous nerve sensation: diminished  Tibial nerve sensation: diminished  Superficial peroneal nerve sensation: diminished  Deep peroneal nerve sensation: diminished  Sural nerve sensation: diminished    Comments  Right forefoot gangrene noted. Dark discoloration noted on on 5 toes. Mild erythema swelling noted. No mal odor noted, or fluctuance or purulent drainage noted. Pain upon palpation of right toes. No other cardinal signs of infection noted     Left Foot/Ankle      Neurovascular  Dorsalis pedis: absent  Posterior tibial: doppler  Saphenous nerve sensation: diminished  Tibial nerve sensation: diminished  Superficial peroneal nerve sensation: diminished  Deep peroneal nerve sensation: diminished  Sural nerve sensation: diminished    Comments  No open wounds noted              Laboratory:  A1C:   Recent Labs   Lab 02/28/23  0549   HGBA1C 8.2*     Blood Cultures: No results for input(s): LABBLOO in the last 48 hours.  CBC:   Recent Labs   Lab 03/03/23  0405   WBC 10.18  10.18   RBC 3.49*  3.49*   HGB 8.9*  8.9*   HCT 28.3*  28.3*     284   MCV 81*  81*   MCH 25.5*  25.5*   MCHC 31.4*  31.4*     CMP:   Recent Labs   Lab 03/03/23  0405   *   CALCIUM 8.4*   ALBUMIN 2.4*   PROT 6.1   *   K 4.1   CO2 24      BUN 16   CREATININE 1.0   ALKPHOS 50*   ALT 15   AST 32   BILITOT 2.0*     Coagulation:   Recent Labs   Lab 03/02/23  1939 03/03/23  0405 03/03/23  1816   INR 1.2  --   --    APTT 27.5   < > 44.5*    < > =  values in this interval not displayed.     CRP:   Recent Labs   Lab 02/27/23 1427   CRP 56.5*     ESR:   Recent Labs   Lab 02/27/23 1427   SEDRATE 112*     Prealbumin: No results for input(s): PREALBUMIN in the last 48 hours.  Wound Cultures: No results for input(s): LABAERO in the last 4320 hours.  Microbiology Results (last 7 days)       Procedure Component Value Units Date/Time    Blood culture #1 **CANNOT BE ORDERED STAT** [[id number]] Collected: 02/27/23 1428    Order Status: Completed Specimen: Blood from Peripheral, Antecubital, Left Updated: 03/03/23 1503     Blood Culture, Routine No Growth after 4 days.    Blood culture #2 **CANNOT BE ORDERED STAT** [[id number]] Collected: 02/27/23 1923    Order Status: Completed Specimen: Blood from Peripheral, Antecubital, Right Updated: 03/02/23 2103     Blood Culture, Routine No Growth to date      No Growth to date      No Growth to date      No Growth to date          Specimen (24h ago, onward)      None

## 2023-03-04 NOTE — SUBJECTIVE & OBJECTIVE
Medications:  Continuous Infusions:   heparin (porcine) in D5W 15 Units/kg/hr (03/04/23 0800)     Scheduled Meds:   acetaminophen  1,000 mg Oral Q8H    amLODIPine  10 mg Oral Daily    aspirin  81 mg Oral Daily    atorvastatin  40 mg Oral QHS    carvediloL  12.5 mg Oral BID    cilostazoL  50 mg Oral BID    gabapentin  100 mg Oral TID    insulin detemir U-100  15 Units Subcutaneous BID    losartan  100 mg Oral Daily    piperacillin-tazobactam (ZOSYN) IVPB  4.5 g Intravenous Q8H    polyethylene glycol  17 g Oral Daily    [START ON 3/5/2023] vancomycin (VANCOCIN) IVPB  1,250 mg Intravenous Q24H     PRN Meds:sodium chloride, albuterol-ipratropium, bisacodyL, dextrose 10%, dextrose 10%, dextrose 10%, dextrose 10%, dextrose, dextrose, glucagon (human recombinant), glucagon (human recombinant), heparin (PORCINE), heparin (PORCINE), HYDROmorphone, insulin aspart U-100, magnesium oxide, magnesium oxide, melatonin, naloxone, ondansetron, oxyCODONE, potassium bicarbonate, potassium bicarbonate, potassium bicarbonate, potassium, sodium phosphates, potassium, sodium phosphates, potassium, sodium phosphates, prochlorperazine, simethicone, sodium chloride 0.9%, sodium chloride 0.9%, Pharmacy to dose Vancomycin consult **AND** vancomycin - pharmacy to dose     Objective:     Vital Signs (Most Recent):  Temp: 98.7 °F (37.1 °C) (03/04/23 0301)  Pulse: 87 (03/04/23 0800)  Resp: (!) 21 (03/04/23 0800)  BP: (!) 153/65 (03/04/23 0800)  SpO2: 100 % (03/04/23 0800)   Vital Signs (24h Range):  Temp:  [98.7 °F (37.1 °C)-99 °F (37.2 °C)] 98.7 °F (37.1 °C)  Pulse:  [72-99] 87  Resp:  [15-46] 21  SpO2:  [70 %-100 %] 100 %  BP: ()/() 153/65  Arterial Line BP: ()/(30-51) 148/44     Date 03/04/23 0700 - 03/05/23 0659   Shift 4441-9444 5875-4040 7484-8061 24 Hour Total   INTAKE   I.V.(mL/kg) 18.7(0.3)   18.7(0.3)   IV Piggyback 50.2   50.2   Shift Total(mL/kg) 68.9(0.9)   68.9(0.9)   OUTPUT   Shift Total(mL/kg)       Weight (kg)  74 74 74 74       Physical Exam  Vitals reviewed.   Constitutional:       Appearance: Normal appearance.   Cardiovascular:      Rate and Rhythm: Normal rate and regular rhythm.      Comments: R biphasic DP and PT   L monophasic AT and biphasic PT  Abdominal:      General: Abdomen is flat.      Palpations: Abdomen is soft.      Comments: L groin dressing - not soaked, not hematoma.     Musculoskeletal:      Comments: R thigh incision with dressing - not soaked, no hematoma.    Neurological:      Mental Status: She is alert.     Significant Labs:  CBC:   Recent Labs   Lab 03/04/23  0415   WBC 11.45   RBC 2.96*   HGB 7.6*   HCT 24.1*      MCV 81*   MCH 25.7*   MCHC 31.5*       CMP:   Recent Labs   Lab 03/04/23  0415   *   CALCIUM 8.4*   ALBUMIN 2.1*   PROT 5.8*   *   K 3.6   CO2 25      BUN 19   CREATININE 0.9   ALKPHOS 43*   ALT 15   AST 31   BILITOT 0.6         Significant Diagnostics:  I have reviewed all pertinent imaging results/findings within the past 24 hours.

## 2023-03-04 NOTE — ANESTHESIA POSTPROCEDURE EVALUATION
Anesthesia Post Evaluation    Patient: Nelsy Marino    Procedure(s) Performed: Procedure(s) (LRB):  AMPUTATION, FOOT, TRANSMETATARSAL (Right)    Final Anesthesia Type: general      Patient location during evaluation: PACU  Patient participation: Yes- Able to Participate  Level of consciousness: awake and alert  Post-procedure vital signs: reviewed and stable  Pain management: adequate  Airway patency: patent    PONV status at discharge: No PONV  Anesthetic complications: no      Cardiovascular status: hemodynamically stable  Respiratory status: spontaneous ventilation  Follow-up not needed.          Vitals Value Taken Time   /60 03/04/23 1232   Temp 98.0 03/04/23 1246   Pulse 74 03/04/23 1245   Resp 21 03/04/23 1245   SpO2 100 % 03/04/23 1245   Vitals shown include unvalidated device data.      No case tracking events are documented in the log.      Pain/Ana Maria Score: Pain Rating Prior to Med Admin: 1 (3/4/2023  5:24 AM)  Pain Rating Post Med Admin: 2 (3/4/2023  2:19 AM)

## 2023-03-04 NOTE — PROGRESS NOTES
[facility]  Vascular Surgery  Progress Note    Patient Name: Nelsy Marino  MRN: [id number]  Admission Date: 2/27/2023  Primary Care Provider: Juan F Garay    Subjective:     Interval History: AF, HDS. To OR for TMA today. Hgb drifting down    Post-Op Info:  Procedure(s) (LRB):  CREATION, BYPASS, ARTERIAL, FEMORAL TO FEMORAL, USING GRAFT, LEFT FEMORAL ENDARTERECTOMY, LEFT ILIAC ARTERY STENT PLACEMENT, RIGHT LOWER EXTREMITY ANGIOGRAM AND RIGHT SFA  ANGIOPLASTY (Bilateral)   2 Days Post-Op       Medications:  Continuous Infusions:   heparin (porcine) in D5W 15 Units/kg/hr (03/04/23 0800)     Scheduled Meds:   acetaminophen  1,000 mg Oral Q8H    amLODIPine  10 mg Oral Daily    aspirin  81 mg Oral Daily    atorvastatin  40 mg Oral QHS    carvediloL  12.5 mg Oral BID    cilostazoL  50 mg Oral BID    gabapentin  100 mg Oral TID    insulin detemir U-100  15 Units Subcutaneous BID    losartan  100 mg Oral Daily    piperacillin-tazobactam (ZOSYN) IVPB  4.5 g Intravenous Q8H    polyethylene glycol  17 g Oral Daily    [START ON 3/5/2023] vancomycin (VANCOCIN) IVPB  1,250 mg Intravenous Q24H     PRN Meds:sodium chloride, albuterol-ipratropium, bisacodyL, dextrose 10%, dextrose 10%, dextrose 10%, dextrose 10%, dextrose, dextrose, glucagon (human recombinant), glucagon (human recombinant), heparin (PORCINE), heparin (PORCINE), HYDROmorphone, insulin aspart U-100, magnesium oxide, magnesium oxide, melatonin, naloxone, ondansetron, oxyCODONE, potassium bicarbonate, potassium bicarbonate, potassium bicarbonate, potassium, sodium phosphates, potassium, sodium phosphates, potassium, sodium phosphates, prochlorperazine, simethicone, sodium chloride 0.9%, sodium chloride 0.9%, Pharmacy to dose Vancomycin consult **AND** vancomycin - pharmacy to dose     Objective:     Vital Signs (Most Recent):  Temp: 98.7 °F (37.1 °C) (03/04/23 0301)  Pulse: 87 (03/04/23 0800)  Resp: (!) 21 (03/04/23 0800)  BP:  (!) 153/65 (03/04/23 0800)  SpO2: 100 % (03/04/23 0800)   Vital Signs (24h Range):  Temp:  [98.7 °F (37.1 °C)-99 °F (37.2 °C)] 98.7 °F (37.1 °C)  Pulse:  [72-99] 87  Resp:  [15-46] 21  SpO2:  [70 %-100 %] 100 %  BP: ()/() 153/65  Arterial Line BP: ()/(30-51) 148/44     Date 03/04/23 0700 - 03/05/23 0659   Shift 3272-8863 1719-5455 0246-8082 24 Hour Total   INTAKE   I.V.(mL/kg) 18.7(0.3)   18.7(0.3)   IV Piggyback 50.2   50.2   Shift Total(mL/kg) 68.9(0.9)   68.9(0.9)   OUTPUT   Shift Total(mL/kg)       Weight (kg) 74 74 74 74       Physical Exam  Vitals reviewed.   Constitutional:       Appearance: Normal appearance.   Cardiovascular:      Rate and Rhythm: Normal rate and regular rhythm.      Comments: R biphasic DP and PT   L monophasic AT and biphasic PT  Abdominal:      General: Abdomen is flat.      Palpations: Abdomen is soft.      Comments: L groin dressing - not soaked, not hematoma.     Musculoskeletal:      Comments: R thigh incision with dressing - not soaked, no hematoma.    Neurological:      Mental Status: She is alert.     Significant Labs:  CBC:   Recent Labs   Lab 03/04/23  0415   WBC 11.45   RBC 2.96*   HGB 7.6*   HCT 24.1*      MCV 81*   MCH 25.7*   MCHC 31.5*       CMP:   Recent Labs   Lab 03/04/23  0415   *   CALCIUM 8.4*   ALBUMIN 2.1*   PROT 5.8*   *   K 3.6   CO2 25      BUN 19   CREATININE 0.9   ALKPHOS 43*   ALT 15   AST 31   BILITOT 0.6         Significant Diagnostics:  I have reviewed all pertinent imaging results/findings within the past 24 hours.    Assessment/Plan:     * Critical limb ischemia with history of revascularization of same extremity  Nelsy Marino is a 82 y.o. lady with severe PAD    - TMA today with podiatry  - Q2H neurovasc checks  - 1U RBC  - poss lasix later after surgery   - NPO, ok for diet after OR  - Continue Heparin gtt  - ASA/plavix/Statin  - Strict I/O  - PT/OT  - Podiatry following  - anticipate stepdown  tomorrow        Gilbert Gomez MD  Vascular Surgery  [facility]   No masses; no nipple discharge detailed exam

## 2023-03-04 NOTE — NURSING
Pt connected to bedside monitor and O2 tank. Anesthesia and Surgery RN @ bedside to transport pt to OR. VSS at time of transfer of care. Consents in chart, chart sent with pt.

## 2023-03-04 NOTE — SUBJECTIVE & OBJECTIVE
Interval History/Significant Events: NAEON. HDS. Some difficulty in obtaining LLE doppler overnight, however able to obtain them this AM.     Follow-up For: Procedure(s) (LRB):  CREATION, BYPASS, ARTERIAL, FEMORAL TO FEMORAL, USING GRAFT, LEFT FEMORAL ENDARTERECTOMY, LEFT ILIAC ARTERY STENT PLACEMENT, RIGHT LOWER EXTREMITY ANGIOGRAM AND RIGHT SFA  ANGIOPLASTY (Bilateral)    Post-Operative Day: 2 Days Post-Op    Objective:     Vital Signs (Most Recent):  Temp: 98.7 °F (37.1 °C) (03/04/23 0300)  Pulse: 84 (03/04/23 0600)  Resp: 20 (03/04/23 0600)  BP: (!) 148/86 (03/04/23 0600)  SpO2: 99 % (03/04/23 0600)   Vital Signs (24h Range):  Temp:  [98.7 °F (37.1 °C)-99 °F (37.2 °C)] 98.7 °F (37.1 °C)  Pulse:  [72-99] 84  Resp:  [15-46] 20  SpO2:  [70 %-100 %] 99 %  BP: ()/() 148/86  Arterial Line BP: ()/(30-51) 158/43     Weight: 74 kg (163 lb 2.3 oz)  Body mass index is 28 kg/m².      Intake/Output Summary (Last 24 hours) at 3/4/2023 0625  Last data filed at 3/4/2023 0600  Gross per 24 hour   Intake 2037.48 ml   Output 1000 ml   Net 1037.48 ml       Physical Exam  Vitals and nursing note reviewed.   Constitutional:       General: She is not in acute distress.     Appearance: Normal appearance. She is obese.   HENT:      Head: Normocephalic and atraumatic.      Nose: No congestion.      Mouth/Throat:      Mouth: Mucous membranes are moist.   Cardiovascular:      Rate and Rhythm: Normal rate and regular rhythm.      Pulses: Normal pulses.      Comments: Monophasic L PT. Monophasic R DP  Pulmonary:      Effort: Pulmonary effort is normal. No respiratory distress.   Abdominal:      General: Abdomen is flat.      Palpations: Abdomen is soft.      Tenderness: There is no abdominal tenderness.      Comments: Possible hematoma formation underlying R lower abdomen and R upper thigh. Skin tough and tender to palpation, however improving.    Musculoskeletal:      Right lower leg: No edema.      Left lower leg: No  edema.      Comments: R LE dry gangrene present. L foot cooler than R, patient mobility in toes very limited.    Skin:     General: Skin is warm and dry.   Neurological:      General: No focal deficit present.      Mental Status: She is alert and oriented to person, place, and time. Mental status is at baseline.   Psychiatric:         Mood and Affect: Mood normal.         Behavior: Behavior normal.         Vents:  Oxygen Concentration (%): 1 (03/04/23 0100)    Lines/Drains/Airways       Drain  Duration             Female External Urinary Catheter 03/03/23 2229 <1 day              Arterial Line  Duration             Arterial Line 03/02/23 1213 Left Radial 1 day              Peripheral Intravenous Line  Duration                  Peripheral IV - Single Lumen 03/01/23 1449 20 G Left Antecubital 2 days         External Jugular IV 03/02/23 1203 1 day         Peripheral IV - Single Lumen 03/02/23 1209 18 G Left Upper Arm 1 day         Peripheral IV - Single Lumen 03/03/23 1815 20 G Anterior;Distal;Left Upper Arm <1 day                    Significant Labs:    CBC/Anemia Profile:  Recent Labs   Lab 03/02/23 1939 03/03/23  0405 03/04/23  0415   WBC 21.02* 10.18  10.18 11.45   HGB 9.6* 8.9*  8.9* 7.6*   HCT 29.7* 28.3*  28.3* 24.1*    284  284 225   MCV 80* 81*  81* 81*   RDW 14.1 14.2  14.2 14.5        Chemistries:  Recent Labs   Lab 03/02/23  0817 03/02/23 1935 03/03/23  0405 03/04/23  0415   * 134* 134* 132*   K 4.2 4.3 4.1 3.6    104 103 100   CO2 25 23 24 25   BUN 22 18 16 19   CREATININE 1.2 1.0 1.0 0.9   CALCIUM 9.2 8.5* 8.4* 8.4*   ALBUMIN  --  2.4* 2.4* 2.1*   PROT  --  6.4 6.1 5.8*   BILITOT  --  1.4* 2.0* 0.6   ALKPHOS  --  52* 50* 43*   ALT  --  12 15 15   AST  --  24 32 31   MG 2.1  --   --   --    PHOS 2.8  --   --   --        All pertinent labs within the past 24 hours have been reviewed.    Significant Imaging:  I have reviewed all pertinent imaging results/findings within the past 24  hours.  I have reviewed and interpreted all pertinent imaging results/findings within the past 24 hours.

## 2023-03-04 NOTE — PLAN OF CARE
"      SICU PLAN OF CARE NOTE    Dx: Critical limb ischemia with history of revascularization of same extremity    Shift Events: Pt to OR for toe amputation, see notes. Art line d/c. No other acute events.     Goals of Care: OOB to chair, work with PT/OT.     Neuro: AAO x4, Follows Commands, and Moves All Extremities    Cardiac: SR 70s-90s. SBP 110s-160s.     Vital Signs: BP (!) 118/55 (BP Location: Right arm, Patient Position: Lying)   Pulse 92   Temp 99.6 °F (37.6 °C) (Oral)   Resp 20   Ht 5' 4" (1.626 m)   Wt 74 kg (163 lb 2.3 oz)   LMP  (LMP Unknown)   SpO2 97%   BMI 28.00 kg/m²     Respiratory: Nasal Cannula    Diet: Diabetic Diet    Gtts: Heparin    Urine Output: Voids Spontaneously 800 cc/shift     Labs/Accuchecks: AC/HS accuchecks.    Skin: No breakdown to sacrum or heels noted. Bilateral groin dsgs CDI. R foot dsg in place. Weight shift assistance provided q 2 hr. Pt on waffle mattress which is inflated. Bed plugged in an working    PoC reviewed with pt and family. All questions answered/concerns addressed. VSS. See flowsheet for full assessment.        "

## 2023-03-04 NOTE — PT/OT/SLP PROGRESS
Physical Therapy  Consult    Patient Name:  Nelsy Marino   MRN:  [id number]  Admitting Diagnosis:  Critical limb ischemia with history of revascularization of same extremity   Recent Surgery: Procedure(s) (LRB):  CREATION, BYPASS, ARTERIAL, FEMORAL TO FEMORAL, USING GRAFT, LEFT FEMORAL ENDARTERECTOMY, LEFT ILIAC ARTERY STENT PLACEMENT, RIGHT LOWER EXTREMITY ANGIOGRAM AND RIGHT SFA  ANGIOPLASTY (Bilateral) 2 Days Post-Op  Admit Date: 2/27/2023  Length of Stay: 5 days    Physical Therapy orders received, acknowledged, and discharged. Per chart review and confirmed by RN, pt is going for a transmetatarsal foot amputation this AM. New orders with weightbearing restrictions/braces required needed post-op. Please re-consult when medically appropriate.    Farrah Simpson, PT, DPT  3/4/2023

## 2023-03-04 NOTE — PLAN OF CARE
Problem: Adult Inpatient Plan of Care  Goal: Plan of Care Review  Outcome: Ongoing, Progressing  Goal: Optimal Comfort and Wellbeing  Outcome: Ongoing, Progressing     Problem: Diabetes Comorbidity  Goal: Blood Glucose Level Within Targeted Range  Outcome: Ongoing, Progressing     Problem: Adjustment to Illness (Sepsis/Septic Shock)  Goal: Optimal Coping  Outcome: Ongoing, Progressing     Problem: Bleeding (Sepsis/Septic Shock)  Goal: Absence of Bleeding  Outcome: Ongoing, Progressing     Problem: Glycemic Control Impaired (Sepsis/Septic Shock)  Goal: Blood Glucose Level Within Desired Range  Outcome: Ongoing, Progressing     Problem: Fall Injury Risk  Goal: Absence of Fall and Fall-Related Injury  Outcome: Ongoing, Progressing     Problem: Skin Injury Risk Increased  Goal: Skin Health and Integrity  Outcome: Ongoing, Progressing     Problem: Infection  Goal: Absence of Infection Signs and Symptoms  Outcome: Ongoing, Progressing

## 2023-03-04 NOTE — NURSING
Received pt from PACU s/p femoral to femoral bypass graft procedure on 3/2/2023 was held in PACU overnight.   Pt AAOx3, pleasant, calm, and cooperative, placed in ICU bed and on SICU monitor showing NSR 80s, on O2 at 2L/min via n/c SATs %. No c/o SOB, pain, or discomfort at this time. Neurocheck preformed see flow sheet for results. Patient due to void, rivas removed prior to tranfer to SICU. Will continue to monitor. SICU MD on call Dr. Stevens at bedside.

## 2023-03-04 NOTE — CONSULTS
[facility] - [facility]  Podiatry  Consult Note    Patient Name: Nelsy Marino  MRN: [id number]  Admission Date: 2/27/2023  Hospital Length of Stay: 4 days  Attending Physician: Danny Dunbar MD  Primary Care Provider: [facility]     Consults  Subjective:     History of Present Illness:  82-year-old female with a past medical history of severe PAD, hypertension, type 2 diabetes, history of DVT who presents with right foot pain.  She underwent fem fem bypass on 3/2. Podiatry was consulted for right forefoot dry gangrene. Patient admits to pain to her leg b/l. Patient admits her pain is worse on her right side.Patient admits her toes have been discolored like this for a while now. Patient denies any fever chills nausea or vomiting. Patient is stable at time of this visit. Patient has no other pedal complaints at this time.       Scheduled Meds:   acetaminophen  1,000 mg Oral Q8H    amLODIPine  10 mg Oral Daily    aspirin  81 mg Oral Daily    atorvastatin  40 mg Oral QHS    carvediloL  12.5 mg Oral BID    cilostazoL  50 mg Oral BID    gabapentin  100 mg Oral TID    insulin detemir U-100  15 Units Subcutaneous BID    losartan  100 mg Oral Daily    piperacillin-tazobactam (ZOSYN) IVPB  4.5 g Intravenous Q8H    polyethylene glycol  17 g Oral Daily    vancomycin (VANCOCIN) IVPB  1,000 mg Intravenous Q24H     Continuous Infusions:   heparin (porcine) in D5W 15 Units/kg/hr (03/03/23 1800)     PRN Meds:sodium chloride, albuterol-ipratropium, bisacodyL, dextrose 10%, dextrose 10%, dextrose 10%, dextrose 10%, dextrose, dextrose, glucagon (human recombinant), glucagon (human recombinant), heparin (PORCINE), heparin (PORCINE), HYDROmorphone, insulin aspart U-100, magnesium oxide, magnesium oxide, melatonin, naloxone, ondansetron, oxyCODONE, potassium bicarbonate, potassium bicarbonate, potassium bicarbonate, potassium, sodium phosphates, potassium, sodium phosphates, potassium, sodium  phosphates, prochlorperazine, simethicone, sodium chloride 0.9%, sodium chloride 0.9%, Pharmacy to dose Vancomycin consult **AND** vancomycin - pharmacy to dose    Review of patient's allergies indicates:   Allergen Reactions    Motrin [ibuprofen] Itching    Zosyn [piperacillin-tazobactam] Itching        Past Medical History:   Diagnosis Date    CHF (congestive heart failure)     Diabetes mellitus     DVT (deep venous thrombosis)     Hypertension     Miscarriage      Past Surgical History:   Procedure Laterality Date     femoral vascular surgery       ANGIOGRAPHY OF LOWER EXTREMITY Right 2023    Procedure: Angiogram Extremity Unilateral;  Surgeon: Danny Dunbar MD;  Location: [facility] OR;  Service: Vascular;  Laterality: Right;  RN PHONE PREOP 2023----CK CONSENT    CREATION OF ILIOFEMORAL ARTERY BYPASS Right 3/20/2019    Procedure: CREATION, BYPASS, ARTERIAL, ILIAC TO FEMORAL, RIGHT LOWER EXTREMITY, RIGHT PROFUNDAPLASTY;  Surgeon: Danny Dunbar MD;  Location: [facility] OR;  Service: Vascular;  Laterality: Right;  11:00AM START PER ANNE RICHARDS PREOP 3/13/2019  ACT'S, CELL SAVER, GRAFTS, BOOK WATER---NEED H/P  CONSENT IN AM------HGBA1C    HYSTERECTOMY      ?unsure cervix present    INCISION AND DRAINAGE Right 2019    Procedure: Incision and Drainage - R groin washout, possible muscle flap;  Surgeon: Danny Dunbar MD;  Location: [facility] OR [address] [place] [address];  Service: Vascular;  Laterality: Right;  groin washout    INCISION AND DRAINAGE OF GROIN Right 5/3/2019    Procedure: Incision and Drainage R groin;  Surgeon: Danny Dunbar MD;  Location: [facility] OR [address] [place] [address];  Service: Vascular;  Laterality: Right;       Family History       Problem Relation (Age of Onset)    Diabetes Father    Hypertension Father          Tobacco Use    Smoking status: Former     Types: Cigarettes     Quit date: 1980     Years since quittin.1    Smokeless tobacco: Never   Substance and Sexual Activity     Alcohol use: No    Drug use: No    Sexual activity: Never     Review of Systems   Constitutional:  Negative for chills and fever.   HENT:  Negative for hearing loss and trouble swallowing.    Eyes:  Negative for discharge and visual disturbance.   Respiratory:  Negative for chest tightness and shortness of breath.    Cardiovascular:  Negative for chest pain and palpitations.   Gastrointestinal:  Negative for abdominal distention, abdominal pain, nausea and vomiting.   Genitourinary:  Negative for difficulty urinating and hematuria.   Musculoskeletal:  Negative for arthralgias and back pain.   Skin:  Positive for color change and wound. Negative for rash.   Neurological:  Negative for dizziness and headaches.   Objective:     Vital Signs (Most Recent):  Temp: 98.8 °F (37.1 °C) (03/03/23 1500)  Pulse: 83 (03/03/23 1845)  Resp: (!) 27 (03/03/23 1845)  BP: 133/60 (03/03/23 1830)  SpO2: 100 % (03/03/23 1845)   Vital Signs (24h Range):  Temp:  [96.6 °F (35.9 °C)-99 °F (37.2 °C)] 98.8 °F (37.1 °C)  Pulse:  [74-99] 83  Resp:  [15-46] 27  SpO2:  [70 %-100 %] 100 %  BP: ()/(44-72) 133/60  Arterial Line BP: ()/(30-56) 120/36     Weight: 74 kg (163 lb 2.3 oz)  Body mass index is 28 kg/m².    Foot Exam    Right Foot/Ankle     Inspection and Palpation  Skin Exam: skin changes and ulcer;     Neurovascular  Dorsalis pedis: doppler  Posterior tibial: doppler  Saphenous nerve sensation: diminished  Tibial nerve sensation: diminished  Superficial peroneal nerve sensation: diminished  Deep peroneal nerve sensation: diminished  Sural nerve sensation: diminished    Comments  Right forefoot gangrene noted. Dark discoloration noted on on 5 toes. Mild erythema swelling noted. No mal odor noted, or fluctuance or purulent drainage noted. Pain upon palpation of right toes. No other cardinal signs of infection noted     Left Foot/Ankle      Neurovascular  Dorsalis pedis: absent  Posterior tibial: doppler  Saphenous nerve  sensation: diminished  Tibial nerve sensation: diminished  Superficial peroneal nerve sensation: diminished  Deep peroneal nerve sensation: diminished  Sural nerve sensation: diminished    Comments  No open wounds noted              Laboratory:  A1C:   Recent Labs   Lab 02/28/23  0549   HGBA1C 8.2*     Blood Cultures: No results for input(s): LABBLOO in the last 48 hours.  CBC:   Recent Labs   Lab 03/03/23  0405   WBC 10.18  10.18   RBC 3.49*  3.49*   HGB 8.9*  8.9*   HCT 28.3*  28.3*     284   MCV 81*  81*   MCH 25.5*  25.5*   MCHC 31.4*  31.4*     CMP:   Recent Labs   Lab 03/03/23  0405   *   CALCIUM 8.4*   ALBUMIN 2.4*   PROT 6.1   *   K 4.1   CO2 24      BUN 16   CREATININE 1.0   ALKPHOS 50*   ALT 15   AST 32   BILITOT 2.0*     Coagulation:   Recent Labs   Lab 03/02/23  1939 03/03/23  0405 03/03/23  1816   INR 1.2  --   --    APTT 27.5   < > 44.5*    < > = values in this interval not displayed.     CRP:   Recent Labs   Lab 02/27/23  1427   CRP 56.5*     ESR:   Recent Labs   Lab 02/27/23  1427   SEDRATE 112*     Prealbumin: No results for input(s): PREALBUMIN in the last 48 hours.  Wound Cultures: No results for input(s): LABAERO in the last 4320 hours.  Microbiology Results (last 7 days)       Procedure Component Value Units Date/Time    Blood culture #1 **CANNOT BE ORDERED STAT** [[id number]] Collected: 02/27/23 1428    Order Status: Completed Specimen: Blood from Peripheral, Antecubital, Left Updated: 03/03/23 1503     Blood Culture, Routine No Growth after 4 days.    Blood culture #2 **CANNOT BE ORDERED STAT** [[id number]] Collected: 02/27/23 1923    Order Status: Completed Specimen: Blood from Peripheral, Antecubital, Right Updated: 03/02/23 2103     Blood Culture, Routine No Growth to date      No Growth to date      No Growth to date      No Growth to date          Specimen (24h ago, onward)      None              Assessment/Plan:     Orthopedic  Dry gangrene  Dry gangrene  noted to right forefoot. Vascular did bypass procedure for RLE on 3/2  - Plan for Right foot transmetatarsal amputation tomorrow morning 3/4  - Long discussion with patient regarding the procedure in detail, Patient understands all risks, potential complications, and alternatives, including, but not limited to those listed on the consent form.  No guarantees given or implied as to outcome. Informed verbal and written consent was obtained. Patient verbalized understanding and would still like to continue with surgery. Consent form discussed, signed and witnessed appropriately. Patient was marked.  - Patient to be NPO tonight at midnight    - Betadine paint applied to patient's right foot today   - Recommend heel offloading boots while patient is laying in bed          Thank you for your consult. I will follow-up with patient. Please contact us if you have any additional questions.    Danisha Hanley DPM PGY-1  Podiatric Medicine & Surgery  [facility]  Secure Chat Preferred  Mobile: [phone]  Pager: [phone]

## 2023-03-04 NOTE — TRANSFER OF CARE
"Anesthesia Transfer of Care Note    Patient: Nelsy Marino    Procedure(s) Performed: Procedure(s) (LRB):  AMPUTATION, FOOT, TRANSMETATARSAL (Right)    Patient location: ICU    Transport from OR: Transported from OR on 2-3 L/min O2 by NC with adequate spontaneous ventilation    Post pain: adequate analgesia    Post assessment: no apparent anesthetic complications    Post vital signs: stable    Level of consciousness: awake    Nausea/Vomiting: no nausea/vomiting    Complications: none    Transfer of care protocol was followed      Last vitals:   Visit Vitals  BP (!) 165/78 (BP Location: Right arm, Patient Position: Lying)   Pulse 88   Temp 37.2 °C (99 °F) (Oral)   Resp (!) 37   Ht 5' 4" (1.626 m)   Wt 74 kg (163 lb 2.3 oz)   LMP  (LMP Unknown)   SpO2 100%   BMI 28.00 kg/m²     "

## 2023-03-05 LAB
ALBUMIN SERPL BCP-MCNC: 2 G/DL (ref 3.5–5.2)
ALP SERPL-CCNC: 44 U/L (ref 55–135)
ALT SERPL W/O P-5'-P-CCNC: 17 U/L (ref 10–44)
ANION GAP SERPL CALC-SCNC: 5 MMOL/L (ref 8–16)
APTT BLDCRRT: 42.2 SEC (ref 21–32)
AST SERPL-CCNC: 29 U/L (ref 10–40)
BASOPHILS # BLD AUTO: 0.02 K/UL (ref 0–0.2)
BASOPHILS NFR BLD: 0.2 % (ref 0–1.9)
BILIRUB SERPL-MCNC: 0.8 MG/DL (ref 0.1–1)
BUN SERPL-MCNC: 23 MG/DL (ref 8–23)
CALCIUM SERPL-MCNC: 8.3 MG/DL (ref 8.7–10.5)
CHLORIDE SERPL-SCNC: 97 MMOL/L (ref 95–110)
CO2 SERPL-SCNC: 28 MMOL/L (ref 23–29)
CREAT SERPL-MCNC: 1.2 MG/DL (ref 0.5–1.4)
DIFFERENTIAL METHOD: ABNORMAL
EOSINOPHIL # BLD AUTO: 0.2 K/UL (ref 0–0.5)
EOSINOPHIL NFR BLD: 1.2 % (ref 0–8)
ERYTHROCYTE [DISTWIDTH] IN BLOOD BY AUTOMATED COUNT: 14.6 % (ref 11.5–14.5)
EST. GFR  (NO RACE VARIABLE): 45.2 ML/MIN/1.73 M^2
GLUCOSE SERPL-MCNC: 175 MG/DL (ref 70–110)
HCT VFR BLD AUTO: 27.5 % (ref 37–48.5)
HGB BLD-MCNC: 8.5 G/DL (ref 12–16)
IMM GRANULOCYTES # BLD AUTO: 0.05 K/UL (ref 0–0.04)
IMM GRANULOCYTES NFR BLD AUTO: 0.4 % (ref 0–0.5)
LYMPHOCYTES # BLD AUTO: 1.1 K/UL (ref 1–4.8)
LYMPHOCYTES NFR BLD: 8.5 % (ref 18–48)
MAGNESIUM SERPL-MCNC: 1.7 MG/DL (ref 1.6–2.6)
MCH RBC QN AUTO: 25.4 PG (ref 27–31)
MCHC RBC AUTO-ENTMCNC: 30.9 G/DL (ref 32–36)
MCV RBC AUTO: 82 FL (ref 82–98)
MONOCYTES # BLD AUTO: 0.9 K/UL (ref 0.3–1)
MONOCYTES NFR BLD: 6.9 % (ref 4–15)
NEUTROPHILS # BLD AUTO: 10.5 K/UL (ref 1.8–7.7)
NEUTROPHILS NFR BLD: 82.8 % (ref 38–73)
NRBC BLD-RTO: 0 /100 WBC
PLATELET # BLD AUTO: 211 K/UL (ref 150–450)
PMV BLD AUTO: 10.3 FL (ref 9.2–12.9)
POCT GLUCOSE: 130 MG/DL (ref 70–110)
POCT GLUCOSE: 144 MG/DL (ref 70–110)
POCT GLUCOSE: 144 MG/DL (ref 70–110)
POCT GLUCOSE: 153 MG/DL (ref 70–110)
POTASSIUM SERPL-SCNC: 3.9 MMOL/L (ref 3.5–5.1)
PROT SERPL-MCNC: 5.9 G/DL (ref 6–8.4)
RBC # BLD AUTO: 3.35 M/UL (ref 4–5.4)
SODIUM SERPL-SCNC: 130 MMOL/L (ref 136–145)
WBC # BLD AUTO: 12.65 K/UL (ref 3.9–12.7)

## 2023-03-05 PROCEDURE — 25000003 PHARM REV CODE 250: Performed by: STUDENT IN AN ORGANIZED HEALTH CARE EDUCATION/TRAINING PROGRAM

## 2023-03-05 PROCEDURE — 25000003 PHARM REV CODE 250: Performed by: SURGERY

## 2023-03-05 PROCEDURE — 99024 POSTOP FOLLOW-UP VISIT: CPT | Mod: ,,, | Performed by: PODIATRIST

## 2023-03-05 PROCEDURE — 25000003 PHARM REV CODE 250: Performed by: INTERNAL MEDICINE

## 2023-03-05 PROCEDURE — 63600175 PHARM REV CODE 636 W HCPCS: Performed by: STUDENT IN AN ORGANIZED HEALTH CARE EDUCATION/TRAINING PROGRAM

## 2023-03-05 PROCEDURE — 99900035 HC TECH TIME PER 15 MIN (STAT)

## 2023-03-05 PROCEDURE — 80053 COMPREHEN METABOLIC PANEL: CPT | Performed by: STUDENT IN AN ORGANIZED HEALTH CARE EDUCATION/TRAINING PROGRAM

## 2023-03-05 PROCEDURE — 25000003 PHARM REV CODE 250: Performed by: EMERGENCY MEDICINE

## 2023-03-05 PROCEDURE — 99024 POSTOP FOLLOW-UP VISIT: CPT | Mod: ,,, | Performed by: SURGERY

## 2023-03-05 PROCEDURE — 25000003 PHARM REV CODE 250

## 2023-03-05 PROCEDURE — 63600175 PHARM REV CODE 636 W HCPCS: Mod: TB,JG | Performed by: SURGERY

## 2023-03-05 PROCEDURE — 94761 N-INVAS EAR/PLS OXIMETRY MLT: CPT

## 2023-03-05 PROCEDURE — 85025 COMPLETE CBC W/AUTO DIFF WBC: CPT | Performed by: STUDENT IN AN ORGANIZED HEALTH CARE EDUCATION/TRAINING PROGRAM

## 2023-03-05 PROCEDURE — 99291 PR CRITICAL CARE, E/M 30-74 MINUTES: ICD-10-PCS | Mod: ,,, | Performed by: ANESTHESIOLOGY

## 2023-03-05 PROCEDURE — 99291 CRITICAL CARE FIRST HOUR: CPT | Mod: ,,, | Performed by: ANESTHESIOLOGY

## 2023-03-05 PROCEDURE — 99024 PR POST-OP FOLLOW-UP VISIT: ICD-10-PCS | Mod: ,,, | Performed by: PODIATRIST

## 2023-03-05 PROCEDURE — 83735 ASSAY OF MAGNESIUM: CPT | Performed by: STUDENT IN AN ORGANIZED HEALTH CARE EDUCATION/TRAINING PROGRAM

## 2023-03-05 PROCEDURE — 27000221 HC OXYGEN, UP TO 24 HOURS

## 2023-03-05 PROCEDURE — 63600175 PHARM REV CODE 636 W HCPCS: Performed by: EMERGENCY MEDICINE

## 2023-03-05 PROCEDURE — 85730 THROMBOPLASTIN TIME PARTIAL: CPT | Performed by: SURGERY

## 2023-03-05 PROCEDURE — 11000001 HC ACUTE MED/SURG PRIVATE ROOM

## 2023-03-05 PROCEDURE — 99024 PR POST-OP FOLLOW-UP VISIT: ICD-10-PCS | Mod: ,,, | Performed by: SURGERY

## 2023-03-05 RX ORDER — CLOPIDOGREL BISULFATE 75 MG/1
75 TABLET ORAL DAILY
Status: DISCONTINUED | OUTPATIENT
Start: 2023-03-05 | End: 2023-03-05

## 2023-03-05 RX ORDER — OXYCODONE HYDROCHLORIDE 10 MG/1
10 TABLET ORAL EVERY 6 HOURS PRN
Status: DISCONTINUED | OUTPATIENT
Start: 2023-03-05 | End: 2023-03-07

## 2023-03-05 RX ORDER — OXYCODONE HYDROCHLORIDE 5 MG/1
5 TABLET ORAL EVERY 4 HOURS PRN
Status: DISCONTINUED | OUTPATIENT
Start: 2023-03-05 | End: 2023-03-07

## 2023-03-05 RX ORDER — CLOPIDOGREL BISULFATE 75 MG/1
75 TABLET ORAL DAILY
Status: DISCONTINUED | OUTPATIENT
Start: 2023-03-06 | End: 2023-03-06

## 2023-03-05 RX ORDER — CLOPIDOGREL BISULFATE 75 MG/1
300 TABLET ORAL ONCE
Status: COMPLETED | OUTPATIENT
Start: 2023-03-05 | End: 2023-03-05

## 2023-03-05 RX ADMIN — HEPARIN SODIUM 15 UNITS/KG/HR: 10000 INJECTION, SOLUTION INTRAVENOUS at 07:03

## 2023-03-05 RX ADMIN — POLYETHYLENE GLYCOL 3350 17 G: 17 POWDER, FOR SOLUTION ORAL at 09:03

## 2023-03-05 RX ADMIN — HYDROMORPHONE HYDROCHLORIDE 0.5 MG: 1 INJECTION, SOLUTION INTRAMUSCULAR; INTRAVENOUS; SUBCUTANEOUS at 11:03

## 2023-03-05 RX ADMIN — GABAPENTIN 100 MG: 100 CAPSULE ORAL at 08:03

## 2023-03-05 RX ADMIN — Medication 800 MG: at 10:03

## 2023-03-05 RX ADMIN — ATORVASTATIN CALCIUM 40 MG: 40 TABLET, FILM COATED ORAL at 08:03

## 2023-03-05 RX ADMIN — CARVEDILOL 12.5 MG: 12.5 TABLET, FILM COATED ORAL at 08:03

## 2023-03-05 RX ADMIN — PIPERACILLIN SODIUM AND TAZOBACTAM SODIUM 4.5 G: 4; .5 INJECTION, POWDER, FOR SOLUTION INTRAVENOUS at 04:03

## 2023-03-05 RX ADMIN — GABAPENTIN 100 MG: 100 CAPSULE ORAL at 07:03

## 2023-03-05 RX ADMIN — OXYCODONE HYDROCHLORIDE 10 MG: 10 TABLET ORAL at 11:03

## 2023-03-05 RX ADMIN — VANCOMYCIN HYDROCHLORIDE 1250 MG: 1.25 INJECTION, POWDER, LYOPHILIZED, FOR SOLUTION INTRAVENOUS at 01:03

## 2023-03-05 RX ADMIN — AMLODIPINE BESYLATE 10 MG: 10 TABLET ORAL at 09:03

## 2023-03-05 RX ADMIN — ACETAMINOPHEN 1000 MG: 500 TABLET ORAL at 05:03

## 2023-03-05 RX ADMIN — CARVEDILOL 12.5 MG: 12.5 TABLET, FILM COATED ORAL at 09:03

## 2023-03-05 RX ADMIN — POTASSIUM BICARBONATE 50 MEQ: 978 TABLET, EFFERVESCENT ORAL at 09:03

## 2023-03-05 RX ADMIN — CILOSTAZOL 50 MG: 50 TABLET ORAL at 09:03

## 2023-03-05 RX ADMIN — INSULIN ASPART 3 UNITS: 100 INJECTION, SOLUTION INTRAVENOUS; SUBCUTANEOUS at 07:03

## 2023-03-05 RX ADMIN — ACETAMINOPHEN 1000 MG: 500 TABLET ORAL at 02:03

## 2023-03-05 RX ADMIN — OXYCODONE HYDROCHLORIDE 5 MG: 5 TABLET ORAL at 11:03

## 2023-03-05 RX ADMIN — GABAPENTIN 100 MG: 100 CAPSULE ORAL at 02:03

## 2023-03-05 RX ADMIN — PIPERACILLIN SODIUM AND TAZOBACTAM SODIUM 4.5 G: 4; .5 INJECTION, POWDER, FOR SOLUTION INTRAVENOUS at 02:03

## 2023-03-05 RX ADMIN — OXYCODONE HYDROCHLORIDE 5 MG: 5 TABLET ORAL at 05:03

## 2023-03-05 RX ADMIN — ACETAMINOPHEN 1000 MG: 500 TABLET ORAL at 09:03

## 2023-03-05 RX ADMIN — CLOPIDOGREL BISULFATE 300 MG: 75 TABLET ORAL at 07:03

## 2023-03-05 RX ADMIN — ASPIRIN 81 MG: 81 TABLET, COATED ORAL at 09:03

## 2023-03-05 RX ADMIN — HYDROMORPHONE HYDROCHLORIDE 0.5 MG: 1 INJECTION, SOLUTION INTRAMUSCULAR; INTRAVENOUS; SUBCUTANEOUS at 07:03

## 2023-03-05 RX ADMIN — OXYCODONE HYDROCHLORIDE 5 MG: 5 TABLET ORAL at 06:03

## 2023-03-05 RX ADMIN — PIPERACILLIN SODIUM AND TAZOBACTAM SODIUM 4.5 G: 4; .5 INJECTION, POWDER, FOR SOLUTION INTRAVENOUS at 08:03

## 2023-03-05 NOTE — PLAN OF CARE
Problem: Adult Inpatient Plan of Care  Goal: Plan of Care Review  Outcome: Ongoing, Progressing  Goal: Absence of Hospital-Acquired Illness or Injury  Outcome: Ongoing, Progressing  Goal: Optimal Comfort and Wellbeing  Outcome: Ongoing, Progressing  Goal: Readiness for Transition of Care  Outcome: Ongoing, Progressing     Problem: Diabetes Comorbidity  Goal: Blood Glucose Level Within Targeted Range  Outcome: Ongoing, Progressing     Problem: Adjustment to Illness (Sepsis/Septic Shock)  Goal: Optimal Coping  Outcome: Ongoing, Progressing     Problem: Bleeding (Sepsis/Septic Shock)  Goal: Absence of Bleeding  Outcome: Ongoing, Progressing     Problem: Glycemic Control Impaired (Sepsis/Septic Shock)  Goal: Blood Glucose Level Within Desired Range  Outcome: Ongoing, Progressing     Problem: Infection Progression (Sepsis/Septic Shock)  Goal: Absence of Infection Signs and Symptoms  Outcome: Ongoing, Progressing     Problem: Skin Injury Risk Increased  Goal: Skin Health and Integrity  Outcome: Ongoing, Progressing     Problem: Infection  Goal: Absence of Infection Signs and Symptoms  Outcome: Ongoing, Progressing

## 2023-03-05 NOTE — ASSESSMENT & PLAN NOTE
Nelsy Marino is a 82 y.o. lady with severe PAD    - s/p TMA 3/4  - Q4H neurovasc checks  - ok for diet  - antibiotics per medicine  - Continue Heparin gtt, switch to eliquis soon  - ASA/plavix/Statin  - PT/OT  - Podiatry following  - stepdown to medicin today

## 2023-03-05 NOTE — OP NOTE
[facility]  Operative Note      Date of Procedure: 3/4/2023     Procedure: Procedure(s) (LRB):  AMPUTATION, FOOT, TRANSMETATARSAL (Right)     Surgeon(s) and Role:     * Benjamin Godfrey DPM - Primary     * Danisha Hanley DPM - Resident - Assisting        Pre-Operative Diagnosis: Dry gangrene [I96]    Post-Operative Diagnosis: Post-Op Diagnosis Codes:     * Dry gangrene [I96]    Anesthesia: Local MAC    Estimated Blood Loss (EBL): 9ml    Description of technical procedures:     The patient was brought to the operating room on a stretcher and was transferred to the OR table in supine position. 30 mL of a 1:1 mixture of 1% lidocaine plain and 0.25% bupivacaine plain was locally infiltrated. The right lower limb was prepped and draped in a sterile manner. Tourniquet(s) not inflated.    Attention was directed to the right  forefoot where an incision was made at a level of the metatarsals such that this incision  allowed adequate coverage of the remaining metatarsal bones for closure while still allowing for resection of the infected bone. This incision was deepened to the level of the bone and utilizing sharp dissection, the distal aspects of the metatarsals were removed after a sagittal saw was used to perform the osteotomy of the metatarsals in a manner that allowed for the maintenance of a parabola-shape of the metatarsals. It was clear after resection of the bone that the remaining bone left intact was viable and appeared to have no signs of osteomyelitis or other infection. Utilizing a rongeur and other instrumentation, all devitalized soft tissue was removed from the area and a bone cutter was used to smooth the remaining surfaces so as to leave no sharp edges at the end of the bones. The bone that had been amputated was sent for culture and pathology, and a proximal margin was sent as well.     The surgical site was irrigated with  saline solution via bulb syringe.  Skin rearrangement and  flap advancement for performed to allow appropriate closure to the right distal TMA surgical site.The surgical site was closed using 3-0 vicryl and 3-0 nylon. The surgical site was dressed with adaptic, 4x4 gauze, ABD pad, kerlix, cast padding, and ACE wrap. Tourniquet(s) not used during procedure.                  Implants: * No implants in log *    Specimens:   Specimen (24h ago, onward)       Start     Ordered    03/04/23 1139  Specimen to Pathology, Surgery Other (Podiatry)  Once        Comments: Pre-op Diagnosis: Dry gangrene [I96]Procedure(s):AMPUTATION, FOOT, TRANSMETATARSAL Number of specimens: 2Name of specimens: 1) R Foot Transmetatarsal - Permanent                                  2) 3rd Metatarsal - Permanent     References:    Click here for ordering Quick Tip   Question Answer Comment   Procedure Type: Other Podiatry   Which provider would you like to cc? ROMI COLLINS    Release to patient Immediate        03/04/23 1140                            Condition: Good    Disposition: PACU - hemodynamically stable.    Attestation: I was present and scrubbed for the entire procedure.

## 2023-03-05 NOTE — PROGRESS NOTES
[facility]  Vascular Surgery  Progress Note    Patient Name: Nelsy Marino  MRN: [id number]  Admission Date: 2/27/2023  Primary Care Provider: Juan F Garay    Subjective:     Interval History: AF, HDS. Pain well controlled. Reports feet are feeling better.     Post-Op Info:  Procedure(s) (LRB):  AMPUTATION, FOOT, TRANSMETATARSAL (Right)   1 Day Post-Op       Medications:  Continuous Infusions:   heparin (porcine) in D5W 15 Units/kg/hr (03/05/23 0700)     Scheduled Meds:   acetaminophen  1,000 mg Oral Q8H    amLODIPine  10 mg Oral Daily    aspirin  81 mg Oral Daily    atorvastatin  40 mg Oral QHS    carvediloL  12.5 mg Oral BID    cilostazoL  50 mg Oral BID    [START ON 3/6/2023] clopidogreL  75 mg Oral Daily    gabapentin  100 mg Oral TID    insulin detemir U-100  15 Units Subcutaneous BID    losartan  100 mg Oral Daily    piperacillin-tazobactam (ZOSYN) IVPB  4.5 g Intravenous Q8H    polyethylene glycol  17 g Oral Daily    vancomycin (VANCOCIN) IVPB  1,250 mg Intravenous Q24H     PRN Meds:sodium chloride, sodium chloride, albuterol-ipratropium, bisacodyL, dextrose 10%, dextrose 10%, dextrose 10%, dextrose 10%, dextrose, dextrose, glucagon (human recombinant), glucagon (human recombinant), heparin (PORCINE), heparin (PORCINE), HYDROmorphone, insulin aspart U-100, magnesium oxide, magnesium oxide, melatonin, naloxone, ondansetron, oxyCODONE, potassium bicarbonate, potassium bicarbonate, potassium bicarbonate, potassium, sodium phosphates, potassium, sodium phosphates, potassium, sodium phosphates, prochlorperazine, simethicone, sodium chloride 0.9%, sodium chloride 0.9%, sodium chloride 0.9%, Pharmacy to dose Vancomycin consult **AND** vancomycin - pharmacy to dose     Objective:     Vital Signs (Most Recent):  Temp: 98.9 °F (37.2 °C) (03/05/23 0315)  Pulse: 83 (03/05/23 0730)  Resp: (!) 22 (03/05/23 0730)  BP: (!) 121/58 (03/05/23 0730)  SpO2: 98 % (03/05/23 0730)   Vital  Signs (24h Range):  Temp:  [98.6 °F (37 °C)-99.7 °F (37.6 °C)] 98.9 °F (37.2 °C)  Pulse:  [73-95] 83  Resp:  [16-37] 22  SpO2:  [86 %-100 %] 98 %  BP: (106-165)/(53-78) 121/58  Arterial Line BP: (101-166)/(35-47) 120/44     Date 03/05/23 0700 - 03/06/23 0659   Shift 1541-2423 8186-5576 1470-8493 24 Hour Total   INTAKE   I.V.(mL/kg) 9.4(0.1)   9.4(0.1)   IV Piggyback 25   25   Shift Total(mL/kg) 34.3(0.5)   34.3(0.5)   OUTPUT   Shift Total(mL/kg)       Weight (kg) 74 74 74 74         Physical Exam  Vitals reviewed.   Constitutional:       Appearance: Normal appearance.   Cardiovascular:      Rate and Rhythm: Normal rate and regular rhythm.      Comments: R biphasic DP and PT   L monophasic AT and biphasic PT  Abdominal:      General: Abdomen is flat.      Palpations: Abdomen is soft.   Musculoskeletal:      Comments: R thigh incision with dressing - not soaked, no hematoma.   L groin dressing - not soaked, not hematoma.  R foot s/p TMA in dressing   Neurological:      Mental Status: She is alert.     Significant Labs:  CBC:   Recent Labs   Lab 03/05/23  0425   WBC 12.65   RBC 3.35*   HGB 8.5*   HCT 27.5*      MCV 82   MCH 25.4*   MCHC 30.9*       CMP:   Recent Labs   Lab 03/05/23  0425   *   CALCIUM 8.3*   ALBUMIN 2.0*   PROT 5.9*   *   K 3.9   CO2 28   CL 97   BUN 23   CREATININE 1.2   ALKPHOS 44*   ALT 17   AST 29   BILITOT 0.8         Significant Diagnostics:  I have reviewed all pertinent imaging results/findings within the past 24 hours.    Assessment/Plan:     * Critical limb ischemia with history of revascularization of same extremity  Nelsy Marino is a 82 y.o. lady with severe PAD    - s/p TMA 3/4  - Q4H neurovasc checks  - ok for diet  - antibiotics per medicine  - Continue Heparin gtt, switch to eliquis soon  - ASA/plavix/Statin  - PT/OT  - Podiatry following  - stepdown to medicin today        Gilbert Gomez MD  Vascular Surgery  [facility] - Surgical Intensive Care

## 2023-03-05 NOTE — ASSESSMENT & PLAN NOTE
82-year-old female with a past medical history of severe PAD, hypertension, type 2 diabetes, history of DVT who presents with right foot pain.  She underwent fem fem bypass on 3/2. Right SFA baloon stent placed. She was admitted to SICU for q1hr neurovascular monitoring after large vessel revascularization. Currently q2hr checks, likely step down. OR 3/4 with podiatry for R foot transmetatarsal amputation.    Neuro/Psych:   -- Sedation: none  -- Pain: scheduled tylenol; oxy and dilaudid prn               CV:   -- SP revascularization of R LE sp SFA stenting      - On low intensity heparin drip   -  Q2 hr nvc  -- HTN   - continue home medications (amlodipine, carvedilol)  -- Severe PAD   - aspirin, plavix, statin  -- HDS       Pulm:   -- Goal O2 sat > 90%  -- Wean as able      Renal:  -- BUN/Cr 19 / 0.9  -- UOP 1L in 24 hrs  -- Monitor UOP      FEN / GI:   -- Net +1L/24hr  -- Replace lytes as needed  -- Nutrition: diabetic diet  -- Dced LR @100/hr                            ID:   -- Tm: Afebrile; WBC 11.4  -- On vanc, zosyn  -- No current signs of sepsis, will continue to monitor      Heme/Onc:   -- Hgb 7.6 from 8.9  -- Daily CBC      Endo:   -- Gluc goal 140-180  -- POCT Glucose Q ACHS  -- Continue subQ insulin therapy   --SSI high dose      PPx:   Feeding: diabetic diet  Analgesia/Sedation: controlled on scheduled tylenol, with oxy prn  Thromboembolic prevention: on hep drip  HOB >30: yes  Stress Ulcer ppx: n/a  Glucose control: Critical care goal 140-180 g/dl  Bowel reg:   Invasive Lines/Drains/Airway: [place]  L Radial A line  18G x2  20G x1  Deescalation: dc'ed mIVF, Continue A line for q6hr blood draws.       Dispo/Code Status/Palliative:   -- SICU / Full Code  -- Stepdown today

## 2023-03-05 NOTE — SUBJECTIVE & OBJECTIVE
Medications:  Continuous Infusions:   heparin (porcine) in D5W 15 Units/kg/hr (03/05/23 0700)     Scheduled Meds:   acetaminophen  1,000 mg Oral Q8H    amLODIPine  10 mg Oral Daily    aspirin  81 mg Oral Daily    atorvastatin  40 mg Oral QHS    carvediloL  12.5 mg Oral BID    cilostazoL  50 mg Oral BID    [START ON 3/6/2023] clopidogreL  75 mg Oral Daily    gabapentin  100 mg Oral TID    insulin detemir U-100  15 Units Subcutaneous BID    losartan  100 mg Oral Daily    piperacillin-tazobactam (ZOSYN) IVPB  4.5 g Intravenous Q8H    polyethylene glycol  17 g Oral Daily    vancomycin (VANCOCIN) IVPB  1,250 mg Intravenous Q24H     PRN Meds:sodium chloride, sodium chloride, albuterol-ipratropium, bisacodyL, dextrose 10%, dextrose 10%, dextrose 10%, dextrose 10%, dextrose, dextrose, glucagon (human recombinant), glucagon (human recombinant), heparin (PORCINE), heparin (PORCINE), HYDROmorphone, insulin aspart U-100, magnesium oxide, magnesium oxide, melatonin, naloxone, ondansetron, oxyCODONE, potassium bicarbonate, potassium bicarbonate, potassium bicarbonate, potassium, sodium phosphates, potassium, sodium phosphates, potassium, sodium phosphates, prochlorperazine, simethicone, sodium chloride 0.9%, sodium chloride 0.9%, sodium chloride 0.9%, Pharmacy to dose Vancomycin consult **AND** vancomycin - pharmacy to dose     Objective:     Vital Signs (Most Recent):  Temp: 98.9 °F (37.2 °C) (03/05/23 0315)  Pulse: 83 (03/05/23 0730)  Resp: (!) 22 (03/05/23 0730)  BP: (!) 121/58 (03/05/23 0730)  SpO2: 98 % (03/05/23 0730)   Vital Signs (24h Range):  Temp:  [98.6 °F (37 °C)-99.7 °F (37.6 °C)] 98.9 °F (37.2 °C)  Pulse:  [73-95] 83  Resp:  [16-37] 22  SpO2:  [86 %-100 %] 98 %  BP: (106-165)/(53-78) 121/58  Arterial Line BP: (101-166)/(35-47) 120/44     Date 03/05/23 0700 - 03/06/23 0659   Shift 4896-1645 6237-8033 2386-5750 24 Hour Total   INTAKE   I.V.(mL/kg) 9.4(0.1)   9.4(0.1)   IV Piggyback 25   25   Shift Total(mL/kg)  34.3(0.5)   34.3(0.5)   OUTPUT   Shift Total(mL/kg)       Weight (kg) 74 74 74 74         Physical Exam  Vitals reviewed.   Constitutional:       Appearance: Normal appearance.   Cardiovascular:      Rate and Rhythm: Normal rate and regular rhythm.      Comments: R biphasic DP and PT   L monophasic AT and biphasic PT  Abdominal:      General: Abdomen is flat.      Palpations: Abdomen is soft.   Musculoskeletal:      Comments: R thigh incision with dressing - not soaked, no hematoma.   L groin dressing - not soaked, not hematoma.  R foot s/p TMA in dressing   Neurological:      Mental Status: She is alert.     Significant Labs:  CBC:   Recent Labs   Lab 03/05/23  0425   WBC 12.65   RBC 3.35*   HGB 8.5*   HCT 27.5*      MCV 82   MCH 25.4*   MCHC 30.9*       CMP:   Recent Labs   Lab 03/05/23  0425   *   CALCIUM 8.3*   ALBUMIN 2.0*   PROT 5.9*   *   K 3.9   CO2 28   CL 97   BUN 23   CREATININE 1.2   ALKPHOS 44*   ALT 17   AST 29   BILITOT 0.8         Significant Diagnostics:  I have reviewed all pertinent imaging results/findings within the past 24 hours.

## 2023-03-05 NOTE — NURSING
Pt complaining of increased R foot pain. Dopperable R PT pulse, unable to assess DP because of dressing in place. Dr. Ceballos with podiatry called to bedside. Dressing taken down, DP and PT pulses dopplerable. Dsg replaced by MD. Dr. Rubi with SICU team aware of increased pain, will adjust pain medication orders.

## 2023-03-05 NOTE — PROGRESS NOTES
[facility]  Critical Care - Surgery  Progress Note    Patient Name: Nelsy Marino  MRN: [id number]  Admission Date: 2/27/2023  Hospital Length of Stay: 6 days  Code Status: Full Code  Attending Provider: Danny Dunbar MD  Primary Care Provider: Juan F Garay   Principal Problem: Critical limb ischemia with history of revascularization of same extremity    Subjective:     Hospital/ICU Course:  Admitted to SICU for neurovascular monitoring. Hemodynamically stable with reassuring dopplers.      Interval History/Significant Events: NAEON. HDS. Run of nonsustained asymptomatic Vtach. Mg pending. Stable for stepdown.    Follow-up For: Procedure(s) (LRB):  CREATION, BYPASS, ARTERIAL, FEMORAL TO FEMORAL, USING GRAFT, LEFT FEMORAL ENDARTERECTOMY, LEFT ILIAC ARTERY STENT PLACEMENT, RIGHT LOWER EXTREMITY ANGIOGRAM AND RIGHT SFA  ANGIOPLASTY (Bilateral)    Post-Operative Day: 2 Days Post-Op    Objective:     Vital Signs (Most Recent):  Temp: 98.9 °F (37.2 °C) (03/05/23 0315)  Pulse: 94 (03/05/23 0545)  Resp: (!) 38 (03/05/23 0545)  BP: (!) 112/57 (03/05/23 0500)  SpO2: (!) 90 % (03/05/23 0545)   Vital Signs (24h Range):  Temp:  [98.6 °F (37 °C)-99.7 °F (37.6 °C)] 98.9 °F (37.2 °C)  Pulse:  [73-95] 94  Resp:  [16-38] 38  SpO2:  [90 %-100 %] 90 %  BP: (106-168)/(53-86) 112/57  Arterial Line BP: (101-166)/(35-47) 120/44     Weight: 74 kg (163 lb 2.3 oz)  Body mass index is 28 kg/m².      Intake/Output Summary (Last 24 hours) at 3/5/2023 0556  Last data filed at 3/5/2023 0500  Gross per 24 hour   Intake 1048.36 ml   Output 800 ml   Net 248.36 ml       Physical Exam  Vitals and nursing note reviewed.   Constitutional:       General: She is not in acute distress.     Appearance: Normal appearance. She is obese.   HENT:      Head: Normocephalic and atraumatic.      Nose: No congestion.      Mouth/Throat:      Mouth: Mucous membranes are moist.   Cardiovascular:      Rate and Rhythm: Normal rate and  regular rhythm.      Pulses: Normal pulses.      Comments: Monophasic L PT. Monophasic R DP  Pulmonary:      Effort: Pulmonary effort is normal. No respiratory distress.   Abdominal:      General: Abdomen is flat.      Palpations: Abdomen is soft.      Tenderness: There is no abdominal tenderness.      Comments: Possible hematoma formation underlying R lower abdomen and R upper thigh. Skin tough and tender to palpation, however improving.    Musculoskeletal:      Right lower leg: No edema.      Left lower leg: No edema.      Comments: R LE dry gangrene present. L foot cooler than R, patient mobility in toes very limited.    Skin:     General: Skin is warm and dry.   Neurological:      General: No focal deficit present.      Mental Status: She is alert and oriented to person, place, and time. Mental status is at baseline.   Psychiatric:         Mood and Affect: Mood normal.         Behavior: Behavior normal.         Vents:  Oxygen Concentration (%): 28 (03/05/23 0348)    Lines/Drains/Airways       Drain  Duration             Female External Urinary Catheter 03/05/23 0132 <1 day              Peripheral Intravenous Line  Duration                  Peripheral IV - Single Lumen 03/01/23 1449 20 G Left Antecubital 3 days         External Jugular IV 03/02/23 1203 2 days         Peripheral IV - Single Lumen 03/02/23 1209 18 G Left Upper Arm 2 days         Peripheral IV - Single Lumen 03/03/23 1815 20 G Anterior;Distal;Left Upper Arm 1 day                    Significant Labs:    CBC/Anemia Profile:  Recent Labs   Lab 03/04/23  0415 03/05/23  0425   WBC 11.45 12.65   HGB 7.6* 8.5*   HCT 24.1* 27.5*    211   MCV 81* 82   RDW 14.5 14.6*        Chemistries:  Recent Labs   Lab 03/04/23  0415 03/04/23  1600 03/05/23  0425   *  --  130*   K 3.6 3.6 3.9     --  97   CO2 25  --  28   BUN 19  --  23   CREATININE 0.9  --  1.2   CALCIUM 8.4*  --  8.3*   ALBUMIN 2.1*  --  2.0*   PROT 5.8*  --  5.9*   BILITOT 0.6  --   0.8   ALKPHOS 43*  --  44*   ALT 15  --  17   AST 31  --  29       All pertinent labs within the past 24 hours have been reviewed.    Significant Imaging:  I have reviewed all pertinent imaging results/findings within the past 24 hours.  I have reviewed and interpreted all pertinent imaging results/findings within the past 24 hours.    Assessment/Plan:     Cardiac/Vascular  * Critical limb ischemia with history of revascularization of same extremity  82-year-old female with a past medical history of severe PAD, hypertension, type 2 diabetes, history of DVT who presents with right foot pain.  She underwent fem fem bypass on 3/2. Right SFA baloon stent placed. She was admitted to SICU for q1hr neurovascular monitoring after large vessel revascularization. Currently q2hr checks, likely step down. OR 3/4 with podiatry for R foot transmetatarsal amputation.    Neuro/Psych:   -- Sedation: none  -- Pain: scheduled tylenol; oxy and dilaudid prn               CV:   -- SP revascularization of R LE sp SFA stenting      - On low intensity heparin drip   -  Q2 hr nvc  -- HTN   - continue home medications (amlodipine, carvedilol)   - holding home HCTZ-ARB at this time  -- Severe PAD   - aspirin, statin  -- HDS       Pulm:   -- Goal O2 sat > 90%  -- Wean as able      Renal:  -- BUN/Cr 19 / 0.9  -- UOP 1L in 24 hrs  -- Monitor UOP      FEN / GI:   -- Net +1L/24hr  -- Replace lytes as needed  -- Nutrition: diabetic diet  -- Dced LR @100/hr                            ID:   -- Tm: Afebrile; WBC 11.4  -- On vanc, zosyn  -- No current signs of sepsis, will continue to monitor      Heme/Onc:   -- Hgb 7.6 from 8.9  -- Daily CBC      Endo:   -- Gluc goal 140-180  -- POCT Glucose Q ACHS  -- Continue subQ insulin therapy   --SSI high dose      PPx:   Feeding: diabetic diet  Analgesia/Sedation: controlled on scheduled tylenol, with oxy prn  Thromboembolic prevention: on hep drip  HOB >30: yes  Stress Ulcer ppx: n/a  Glucose control: Critical  care goal 140-180 g/dl  Bowel reg:   Invasive Lines/Drains/Airway:  18G x2  20G x1  Deescalation: dc'ed mIVF       Dispo/Code Status/Palliative:   -- SICU / Full Code  -- Stepdown today        Critical care was time spent personally by me on the following activities: development of treatment plan with patient or surrogate and bedside caregivers, discussions with consultants, evaluation of patient's response to treatment, examination of patient, ordering and performing treatments and interventions, ordering and review of laboratory studies, ordering and review of radiographic studies, pulse oximetry, re-evaluation of patient's condition.  This critical care time did not overlap with that of any other provider or involve time for any procedures.     Benjamin Wiseman MD  Critical Care - Surgery  [facility]

## 2023-03-05 NOTE — SUBJECTIVE & OBJECTIVE
Interval History/Significant Events: NAEON. HDS. Run of nonsustained asymptomatic Vtach. Stable for stepdown.    Follow-up For: Procedure(s) (LRB):  CREATION, BYPASS, ARTERIAL, FEMORAL TO FEMORAL, USING GRAFT, LEFT FEMORAL ENDARTERECTOMY, LEFT ILIAC ARTERY STENT PLACEMENT, RIGHT LOWER EXTREMITY ANGIOGRAM AND RIGHT SFA  ANGIOPLASTY (Bilateral)    Post-Operative Day: 2 Days Post-Op    Objective:     Vital Signs (Most Recent):  Temp: 98.9 °F (37.2 °C) (03/05/23 0315)  Pulse: 94 (03/05/23 0545)  Resp: (!) 38 (03/05/23 0545)  BP: (!) 112/57 (03/05/23 0500)  SpO2: (!) 90 % (03/05/23 0545)   Vital Signs (24h Range):  Temp:  [98.6 °F (37 °C)-99.7 °F (37.6 °C)] 98.9 °F (37.2 °C)  Pulse:  [73-95] 94  Resp:  [16-38] 38  SpO2:  [90 %-100 %] 90 %  BP: (106-168)/(53-86) 112/57  Arterial Line BP: (101-166)/(35-47) 120/44     Weight: 74 kg (163 lb 2.3 oz)  Body mass index is 28 kg/m².      Intake/Output Summary (Last 24 hours) at 3/5/2023 0556  Last data filed at 3/5/2023 0500  Gross per 24 hour   Intake 1048.36 ml   Output 800 ml   Net 248.36 ml       Physical Exam  Vitals and nursing note reviewed.   Constitutional:       General: She is not in acute distress.     Appearance: Normal appearance. She is obese.   HENT:      Head: Normocephalic and atraumatic.      Nose: No congestion.      Mouth/Throat:      Mouth: Mucous membranes are moist.   Cardiovascular:      Rate and Rhythm: Normal rate and regular rhythm.      Pulses: Normal pulses.      Comments: Monophasic L PT. Monophasic R DP  Pulmonary:      Effort: Pulmonary effort is normal. No respiratory distress.   Abdominal:      General: Abdomen is flat.      Palpations: Abdomen is soft.      Tenderness: There is no abdominal tenderness.      Comments: Possible hematoma formation underlying R lower abdomen and R upper thigh. Skin tough and tender to palpation, however improving.    Musculoskeletal:      Right lower leg: No edema.      Left lower leg: No edema.      Comments: ZOLTAN UGARTE  dry gangrene present. L foot cooler than R, patient mobility in toes very limited.    Skin:     General: Skin is warm and dry.   Neurological:      General: No focal deficit present.      Mental Status: She is alert and oriented to person, place, and time. Mental status is at baseline.   Psychiatric:         Mood and Affect: Mood normal.         Behavior: Behavior normal.         Vents:  Oxygen Concentration (%): 28 (03/05/23 0348)    Lines/Drains/Airways       Drain  Duration             Female External Urinary Catheter 03/05/23 0132 <1 day              Peripheral Intravenous Line  Duration                  Peripheral IV - Single Lumen 03/01/23 1449 20 G Left Antecubital 3 days         External Jugular IV 03/02/23 1203 2 days         Peripheral IV - Single Lumen 03/02/23 1209 18 G Left Upper Arm 2 days         Peripheral IV - Single Lumen 03/03/23 1815 20 G Anterior;Distal;Left Upper Arm 1 day                    Significant Labs:    CBC/Anemia Profile:  Recent Labs   Lab 03/04/23  0415 03/05/23  0425   WBC 11.45 12.65   HGB 7.6* 8.5*   HCT 24.1* 27.5*    211   MCV 81* 82   RDW 14.5 14.6*        Chemistries:  Recent Labs   Lab 03/04/23  0415 03/04/23  1600 03/05/23  0425   *  --  130*   K 3.6 3.6 3.9     --  97   CO2 25  --  28   BUN 19  --  23   CREATININE 0.9  --  1.2   CALCIUM 8.4*  --  8.3*   ALBUMIN 2.1*  --  2.0*   PROT 5.8*  --  5.9*   BILITOT 0.6  --  0.8   ALKPHOS 43*  --  44*   ALT 15  --  17   AST 31  --  29       All pertinent labs within the past 24 hours have been reviewed.    Significant Imaging:  I have reviewed all pertinent imaging results/findings within the past 24 hours.  I have reviewed and interpreted all pertinent imaging results/findings within the past 24 hours.

## 2023-03-05 NOTE — PLAN OF CARE
Chart reviewed briefly.   Will assume care once transfer to [facility] floor.

## 2023-03-06 LAB
ALBUMIN SERPL BCP-MCNC: 2.1 G/DL (ref 3.5–5.2)
ALP SERPL-CCNC: 46 U/L (ref 55–135)
ALT SERPL W/O P-5'-P-CCNC: 16 U/L (ref 10–44)
ANION GAP SERPL CALC-SCNC: 5 MMOL/L (ref 8–16)
APTT BLDCRRT: 38.3 SEC (ref 21–32)
AST SERPL-CCNC: 32 U/L (ref 10–40)
BASOPHILS # BLD AUTO: 0.01 K/UL (ref 0–0.2)
BASOPHILS # BLD AUTO: 0.02 K/UL (ref 0–0.2)
BASOPHILS NFR BLD: 0.1 % (ref 0–1.9)
BASOPHILS NFR BLD: 0.2 % (ref 0–1.9)
BILIRUB SERPL-MCNC: 0.8 MG/DL (ref 0.1–1)
BLD PROD TYP BPU: NORMAL
BLD PROD TYP BPU: NORMAL
BLOOD UNIT EXPIRATION DATE: NORMAL
BLOOD UNIT EXPIRATION DATE: NORMAL
BLOOD UNIT TYPE CODE: 1700
BLOOD UNIT TYPE CODE: 1700
BLOOD UNIT TYPE: NORMAL
BLOOD UNIT TYPE: NORMAL
BUN SERPL-MCNC: 29 MG/DL (ref 8–23)
CALCIUM SERPL-MCNC: 8.6 MG/DL (ref 8.7–10.5)
CHLORIDE SERPL-SCNC: 96 MMOL/L (ref 95–110)
CO2 SERPL-SCNC: 28 MMOL/L (ref 23–29)
CODING SYSTEM: NORMAL
CODING SYSTEM: NORMAL
CREAT SERPL-MCNC: 1.4 MG/DL (ref 0.5–1.4)
CROSSMATCH INTERPRETATION: NORMAL
CROSSMATCH INTERPRETATION: NORMAL
DIFFERENTIAL METHOD: ABNORMAL
DIFFERENTIAL METHOD: ABNORMAL
DISPENSE STATUS: NORMAL
DISPENSE STATUS: NORMAL
EOSINOPHIL # BLD AUTO: 0.2 K/UL (ref 0–0.5)
EOSINOPHIL # BLD AUTO: 0.2 K/UL (ref 0–0.5)
EOSINOPHIL NFR BLD: 1.5 % (ref 0–8)
EOSINOPHIL NFR BLD: 1.5 % (ref 0–8)
ERYTHROCYTE [DISTWIDTH] IN BLOOD BY AUTOMATED COUNT: 14.6 % (ref 11.5–14.5)
ERYTHROCYTE [DISTWIDTH] IN BLOOD BY AUTOMATED COUNT: 14.9 % (ref 11.5–14.5)
EST. GFR  (NO RACE VARIABLE): 37.6 ML/MIN/1.73 M^2
GLUCOSE SERPL-MCNC: 90 MG/DL (ref 70–110)
HCT VFR BLD AUTO: 23.7 % (ref 37–48.5)
HCT VFR BLD AUTO: 25 % (ref 37–48.5)
HGB BLD-MCNC: 7.5 G/DL (ref 12–16)
HGB BLD-MCNC: 8.1 G/DL (ref 12–16)
IMM GRANULOCYTES # BLD AUTO: 0.07 K/UL (ref 0–0.04)
IMM GRANULOCYTES # BLD AUTO: 0.09 K/UL (ref 0–0.04)
IMM GRANULOCYTES NFR BLD AUTO: 0.6 % (ref 0–0.5)
IMM GRANULOCYTES NFR BLD AUTO: 0.7 % (ref 0–0.5)
LYMPHOCYTES # BLD AUTO: 0.9 K/UL (ref 1–4.8)
LYMPHOCYTES # BLD AUTO: 1.2 K/UL (ref 1–4.8)
LYMPHOCYTES NFR BLD: 7.7 % (ref 18–48)
LYMPHOCYTES NFR BLD: 9.1 % (ref 18–48)
MCH RBC QN AUTO: 25.8 PG (ref 27–31)
MCH RBC QN AUTO: 26.1 PG (ref 27–31)
MCHC RBC AUTO-ENTMCNC: 31.6 G/DL (ref 32–36)
MCHC RBC AUTO-ENTMCNC: 32.4 G/DL (ref 32–36)
MCV RBC AUTO: 81 FL (ref 82–98)
MCV RBC AUTO: 81 FL (ref 82–98)
MONOCYTES # BLD AUTO: 0.9 K/UL (ref 0.3–1)
MONOCYTES # BLD AUTO: 0.9 K/UL (ref 0.3–1)
MONOCYTES NFR BLD: 6.5 % (ref 4–15)
MONOCYTES NFR BLD: 8.1 % (ref 4–15)
NEUTROPHILS # BLD AUTO: 10.7 K/UL (ref 1.8–7.7)
NEUTROPHILS # BLD AUTO: 9 K/UL (ref 1.8–7.7)
NEUTROPHILS NFR BLD: 82 % (ref 38–73)
NEUTROPHILS NFR BLD: 82 % (ref 38–73)
NRBC BLD-RTO: 0 /100 WBC
NRBC BLD-RTO: 0 /100 WBC
NUM UNITS TRANS PACKED RBC: NORMAL
PLATELET # BLD AUTO: 240 K/UL (ref 150–450)
PLATELET # BLD AUTO: 245 K/UL (ref 150–450)
PMV BLD AUTO: 10.3 FL (ref 9.2–12.9)
PMV BLD AUTO: 10.3 FL (ref 9.2–12.9)
POCT GLUCOSE: 117 MG/DL (ref 70–110)
POCT GLUCOSE: 240 MG/DL (ref 70–110)
POCT GLUCOSE: 260 MG/DL (ref 70–110)
POCT GLUCOSE: 361 MG/DL (ref 70–110)
POTASSIUM SERPL-SCNC: 4.9 MMOL/L (ref 3.5–5.1)
PROT SERPL-MCNC: 6.6 G/DL (ref 6–8.4)
RBC # BLD AUTO: 2.91 M/UL (ref 4–5.4)
RBC # BLD AUTO: 3.1 M/UL (ref 4–5.4)
SODIUM SERPL-SCNC: 129 MMOL/L (ref 136–145)
TRANS ERYTHROCYTES VOL PATIENT: NORMAL ML
VANCOMYCIN TROUGH SERPL-MCNC: 12.2 UG/ML (ref 10–22)
WBC # BLD AUTO: 11 K/UL (ref 3.9–12.7)
WBC # BLD AUTO: 13.01 K/UL (ref 3.9–12.7)

## 2023-03-06 PROCEDURE — 25000003 PHARM REV CODE 250

## 2023-03-06 PROCEDURE — 99900035 HC TECH TIME PER 15 MIN (STAT)

## 2023-03-06 PROCEDURE — 97166 OT EVAL MOD COMPLEX 45 MIN: CPT

## 2023-03-06 PROCEDURE — 27000221 HC OXYGEN, UP TO 24 HOURS

## 2023-03-06 PROCEDURE — 85025 COMPLETE CBC W/AUTO DIFF WBC: CPT | Performed by: STUDENT IN AN ORGANIZED HEALTH CARE EDUCATION/TRAINING PROGRAM

## 2023-03-06 PROCEDURE — 97535 SELF CARE MNGMENT TRAINING: CPT

## 2023-03-06 PROCEDURE — 94799 UNLISTED PULMONARY SVC/PX: CPT

## 2023-03-06 PROCEDURE — 63600175 PHARM REV CODE 636 W HCPCS: Performed by: SURGERY

## 2023-03-06 PROCEDURE — 25000003 PHARM REV CODE 250: Performed by: STUDENT IN AN ORGANIZED HEALTH CARE EDUCATION/TRAINING PROGRAM

## 2023-03-06 PROCEDURE — 63600175 PHARM REV CODE 636 W HCPCS: Performed by: EMERGENCY MEDICINE

## 2023-03-06 PROCEDURE — 11000001 HC ACUTE MED/SURG PRIVATE ROOM

## 2023-03-06 PROCEDURE — 51798 US URINE CAPACITY MEASURE: CPT

## 2023-03-06 PROCEDURE — 80202 ASSAY OF VANCOMYCIN: CPT | Performed by: SURGERY

## 2023-03-06 PROCEDURE — 99233 SBSQ HOSP IP/OBS HIGH 50: CPT | Mod: ,,, | Performed by: ANESTHESIOLOGY

## 2023-03-06 PROCEDURE — 25000003 PHARM REV CODE 250: Performed by: ANESTHESIOLOGY

## 2023-03-06 PROCEDURE — 85025 COMPLETE CBC W/AUTO DIFF WBC: CPT | Mod: 91 | Performed by: STUDENT IN AN ORGANIZED HEALTH CARE EDUCATION/TRAINING PROGRAM

## 2023-03-06 PROCEDURE — 25000003 PHARM REV CODE 250: Performed by: EMERGENCY MEDICINE

## 2023-03-06 PROCEDURE — 97530 THERAPEUTIC ACTIVITIES: CPT

## 2023-03-06 PROCEDURE — 85730 THROMBOPLASTIN TIME PARTIAL: CPT | Performed by: STUDENT IN AN ORGANIZED HEALTH CARE EDUCATION/TRAINING PROGRAM

## 2023-03-06 PROCEDURE — 97162 PT EVAL MOD COMPLEX 30 MIN: CPT

## 2023-03-06 PROCEDURE — 99900031 HC PATIENT EDUCATION (STAT)

## 2023-03-06 PROCEDURE — 63600175 PHARM REV CODE 636 W HCPCS: Performed by: ANESTHESIOLOGY

## 2023-03-06 PROCEDURE — 96372 THER/PROPH/DIAG INJ SC/IM: CPT

## 2023-03-06 PROCEDURE — 99233 PR SUBSEQUENT HOSPITAL CARE,LEVL III: ICD-10-PCS | Mod: ,,, | Performed by: ANESTHESIOLOGY

## 2023-03-06 PROCEDURE — 25000003 PHARM REV CODE 250: Performed by: INTERNAL MEDICINE

## 2023-03-06 PROCEDURE — 63600175 PHARM REV CODE 636 W HCPCS: Performed by: STUDENT IN AN ORGANIZED HEALTH CARE EDUCATION/TRAINING PROGRAM

## 2023-03-06 PROCEDURE — 94761 N-INVAS EAR/PLS OXIMETRY MLT: CPT

## 2023-03-06 PROCEDURE — 63600175 PHARM REV CODE 636 W HCPCS

## 2023-03-06 PROCEDURE — 25000003 PHARM REV CODE 250: Performed by: SURGERY

## 2023-03-06 PROCEDURE — 80053 COMPREHEN METABOLIC PANEL: CPT | Performed by: STUDENT IN AN ORGANIZED HEALTH CARE EDUCATION/TRAINING PROGRAM

## 2023-03-06 RX ORDER — CLOPIDOGREL BISULFATE 75 MG/1
75 TABLET ORAL DAILY
Status: DISCONTINUED | OUTPATIENT
Start: 2023-03-07 | End: 2023-03-16

## 2023-03-06 RX ORDER — CLOPIDOGREL BISULFATE 75 MG/1
75 TABLET ORAL DAILY
Status: DISCONTINUED | OUTPATIENT
Start: 2023-03-06 | End: 2023-03-06

## 2023-03-06 RX ADMIN — CARVEDILOL 12.5 MG: 12.5 TABLET, FILM COATED ORAL at 08:03

## 2023-03-06 RX ADMIN — ACETAMINOPHEN 1000 MG: 500 TABLET ORAL at 05:03

## 2023-03-06 RX ADMIN — GABAPENTIN 100 MG: 100 CAPSULE ORAL at 08:03

## 2023-03-06 RX ADMIN — HYDROMORPHONE HYDROCHLORIDE 0.5 MG: 1 INJECTION, SOLUTION INTRAMUSCULAR; INTRAVENOUS; SUBCUTANEOUS at 12:03

## 2023-03-06 RX ADMIN — OXYCODONE HYDROCHLORIDE 5 MG: 5 TABLET ORAL at 08:03

## 2023-03-06 RX ADMIN — HYDROMORPHONE HYDROCHLORIDE 0.5 MG: 1 INJECTION, SOLUTION INTRAMUSCULAR; INTRAVENOUS; SUBCUTANEOUS at 09:03

## 2023-03-06 RX ADMIN — HYDROMORPHONE HYDROCHLORIDE 0.5 MG: 1 INJECTION, SOLUTION INTRAMUSCULAR; INTRAVENOUS; SUBCUTANEOUS at 03:03

## 2023-03-06 RX ADMIN — PIPERACILLIN SODIUM AND TAZOBACTAM SODIUM 4.5 G: 4; .5 INJECTION, POWDER, FOR SOLUTION INTRAVENOUS at 05:03

## 2023-03-06 RX ADMIN — PIPERACILLIN AND TAZOBACTAM 4.5 G: 4; .5 INJECTION, POWDER, LYOPHILIZED, FOR SOLUTION INTRAVENOUS; PARENTERAL at 05:03

## 2023-03-06 RX ADMIN — INSULIN ASPART 9 UNITS: 100 INJECTION, SOLUTION INTRAVENOUS; SUBCUTANEOUS at 02:03

## 2023-03-06 RX ADMIN — PIPERACILLIN AND TAZOBACTAM 4.5 G: 4; .5 INJECTION, POWDER, LYOPHILIZED, FOR SOLUTION INTRAVENOUS; PARENTERAL at 11:03

## 2023-03-06 RX ADMIN — ASPIRIN 81 MG: 81 TABLET, COATED ORAL at 08:03

## 2023-03-06 RX ADMIN — APIXABAN 5 MG: 5 TABLET, FILM COATED ORAL at 10:03

## 2023-03-06 RX ADMIN — INSULIN ASPART 6 UNITS: 100 INJECTION, SOLUTION INTRAVENOUS; SUBCUTANEOUS at 06:03

## 2023-03-06 RX ADMIN — ACETAMINOPHEN 1000 MG: 500 TABLET ORAL at 09:03

## 2023-03-06 RX ADMIN — POLYETHYLENE GLYCOL 3350 17 G: 17 POWDER, FOR SOLUTION ORAL at 08:03

## 2023-03-06 RX ADMIN — SODIUM CHLORIDE, POTASSIUM CHLORIDE, SODIUM LACTATE AND CALCIUM CHLORIDE 500 ML: 600; 310; 30; 20 INJECTION, SOLUTION INTRAVENOUS at 05:03

## 2023-03-06 RX ADMIN — APIXABAN 5 MG: 5 TABLET, FILM COATED ORAL at 08:03

## 2023-03-06 RX ADMIN — CLOPIDOGREL BISULFATE 75 MG: 75 TABLET ORAL at 08:03

## 2023-03-06 RX ADMIN — ATORVASTATIN CALCIUM 40 MG: 40 TABLET, FILM COATED ORAL at 08:03

## 2023-03-06 RX ADMIN — GABAPENTIN 100 MG: 100 CAPSULE ORAL at 01:03

## 2023-03-06 RX ADMIN — VANCOMYCIN HYDROCHLORIDE 1000 MG: 1 INJECTION, POWDER, LYOPHILIZED, FOR SOLUTION INTRAVENOUS at 06:03

## 2023-03-06 RX ADMIN — AMLODIPINE BESYLATE 10 MG: 10 TABLET ORAL at 08:03

## 2023-03-06 RX ADMIN — CILOSTAZOL 50 MG: 50 TABLET ORAL at 10:03

## 2023-03-06 RX ADMIN — OXYCODONE HYDROCHLORIDE 5 MG: 5 TABLET ORAL at 01:03

## 2023-03-06 NOTE — SUBJECTIVE & OBJECTIVE
Subjective:     Interval History: Patient is seen laying in bed. Patient admits to pain to her rigth foot. Patient denies any fever chills nausea or vomiting.    Follow-up For: Procedure(s) (LRB):  AMPUTATION, FOOT, TRANSMETATARSAL (Right)    Post-Operative Day: 1 Day Post-Op    Scheduled Meds:   acetaminophen  1,000 mg Oral Q8H    amLODIPine  10 mg Oral Daily    aspirin  81 mg Oral Daily    atorvastatin  40 mg Oral QHS    carvediloL  12.5 mg Oral BID    cilostazoL  50 mg Oral BID    [START ON 3/6/2023] clopidogreL  75 mg Oral Daily    gabapentin  100 mg Oral TID    insulin detemir U-100  15 Units Subcutaneous BID    piperacillin-tazobactam (ZOSYN) IVPB  4.5 g Intravenous Q8H    polyethylene glycol  17 g Oral Daily     Continuous Infusions:   heparin (porcine) in D5W 15 Units/kg/hr (03/05/23 1941)     PRN Meds:sodium chloride, sodium chloride, albuterol-ipratropium, bisacodyL, dextrose 10%, dextrose 10%, dextrose 10%, dextrose 10%, dextrose, dextrose, glucagon (human recombinant), glucagon (human recombinant), heparin (PORCINE), heparin (PORCINE), HYDROmorphone, insulin aspart U-100, magnesium oxide, magnesium oxide, melatonin, naloxone, ondansetron, oxyCODONE, oxyCODONE, potassium bicarbonate, potassium bicarbonate, potassium bicarbonate, potassium, sodium phosphates, potassium, sodium phosphates, potassium, sodium phosphates, prochlorperazine, simethicone, sodium chloride 0.9%, sodium chloride 0.9%, sodium chloride 0.9%, Pharmacy to dose Vancomycin consult **AND** vancomycin - pharmacy to dose    Review of Systems   Constitutional:  Negative for chills and fever.   HENT:  Negative for hearing loss and trouble swallowing.    Eyes:  Negative for discharge and visual disturbance.   Respiratory:  Negative for chest tightness and shortness of breath.    Cardiovascular:  Negative for chest pain and palpitations.   Gastrointestinal:  Negative for abdominal distention, abdominal pain, nausea and vomiting.    Genitourinary:  Negative for difficulty urinating and hematuria.   Musculoskeletal:  Negative for arthralgias and back pain.   Skin:  Positive for color change and wound. Negative for rash.   Neurological:  Negative for dizziness and headaches.   Objective:     Vital Signs (Most Recent):  Temp: 98.9 °F (37.2 °C) (03/05/23 1600)  Pulse: 81 (03/05/23 2000)  Resp: (!) 26 (03/05/23 2000)  BP: 115/63 (03/05/23 2000)  SpO2: 96 % (03/05/23 2000)   Vital Signs (24h Range):  Temp:  [98.3 °F (36.8 °C)-99.7 °F (37.6 °C)] 98.9 °F (37.2 °C)  Pulse:  [77-95] 81  Resp:  [16-42] 26  SpO2:  [86 %-100 %] 96 %  BP: ()/(48-80) 115/63     Weight: 74 kg (163 lb 2.3 oz)  Body mass index is 28 kg/m².    Foot Exam    Right Foot/Ankle     Inspection and Palpation  Skin Exam: skin changes and ulcer;     Neurovascular  Dorsalis pedis: doppler  Posterior tibial: doppler  Saphenous nerve sensation: diminished  Tibial nerve sensation: diminished  Superficial peroneal nerve sensation: diminished  Deep peroneal nerve sensation: diminished  Sural nerve sensation: diminished    Comments  S/p rigth tma. Sutures are intact without dehiscence. No mal odor noted, or fluctuance or purulent drainage noted. Pain upon palpation of right toes. No other cardinal signs of infection noted     Left Foot/Ankle      Neurovascular  Dorsalis pedis: absent  Posterior tibial: doppler  Saphenous nerve sensation: diminished  Tibial nerve sensation: diminished  Superficial peroneal nerve sensation: diminished  Deep peroneal nerve sensation: diminished  Sural nerve sensation: diminished    Comments  No open wounds noted          Laboratory:  A1C:   Recent Labs   Lab 02/28/23  0549   HGBA1C 8.2*     Blood Cultures: No results for input(s): LABBLOO in the last 48 hours.  CBC:   Recent Labs   Lab 03/05/23  0425   WBC 12.65   RBC 3.35*   HGB 8.5*   HCT 27.5*      MCV 82   MCH 25.4*   MCHC 30.9*     CMP:   Recent Labs   Lab 03/05/23  0425   *   CALCIUM  8.3*   ALBUMIN 2.0*   PROT 5.9*   *   K 3.9   CO2 28   CL 97   BUN 23   CREATININE 1.2   ALKPHOS 44*   ALT 17   AST 29   BILITOT 0.8     Coagulation:   Recent Labs   Lab 03/02/23  1939 03/03/23  0405 03/05/23  0505   INR 1.2  --   --    APTT 27.5   < > 42.2*    < > = values in this interval not displayed.     CRP:   Recent Labs   Lab 02/27/23  1427   CRP 56.5*     ESR:   Recent Labs   Lab 02/27/23  1427   SEDRATE 112*     Prealbumin: No results for input(s): PREALBUMIN in the last 48 hours.  Wound Cultures:   Recent Labs   Lab 03/04/23  1141   LABAERO No growth     Microbiology Results (last 7 days)       Procedure Component Value Units Date/Time    Aerobic culture [[id number]] Collected: 03/04/23 1141    Order Status: Completed Specimen: Incision site from Foot, Right Updated: 03/05/23 0814     Aerobic Bacterial Culture No growth    Narrative:      3rd Metatarsal    Culture, Anaerobe [[id number]] Collected: 03/04/23 1141    Order Status: Completed Specimen: Incision site from Foot, Right Updated: 03/05/23 0708     Anaerobic Culture Culture in progress    Narrative:      3rd Metatarsal    Gram stain [[id number]] Collected: 03/04/23 1141    Order Status: Completed Specimen: Incision site from Foot, Right Updated: 03/04/23 1418     Gram Stain Result No WBC's      No organisms seen    Narrative:      3rd Metatarsal    Fungus culture [[id number]] Collected: 03/04/23 1141    Order Status: Sent Specimen: Incision site from Foot, Right Updated: 03/04/23 1204    AFB Culture & Smear [[id number]] Collected: 03/04/23 1150    Order Status: Sent Specimen: Incision site from Foot, Right Updated: 03/04/23 1150    Blood culture #2 **CANNOT BE ORDERED STAT** [[id number]] Collected: 02/27/23 1923    Order Status: Completed Specimen: Blood from Peripheral, Antecubital, Right Updated: 03/03/23 2103     Blood Culture, Routine No Growth after 4 days.    Blood culture #1 **CANNOT BE ORDERED STAT** [[id number]] Collected: 02/27/23 1428     Order Status: Completed Specimen: Blood from Peripheral, Antecubital, Left Updated: 03/03/23 1503     Blood Culture, Routine No Growth after 4 days.          Specimen (24h ago, onward)      None

## 2023-03-06 NOTE — SUBJECTIVE & OBJECTIVE
Medications:  Continuous Infusions:   heparin (porcine) in D5W 17 Units/kg/hr (03/06/23 0717)     Scheduled Meds:   acetaminophen  1,000 mg Oral Q8H    amLODIPine  10 mg Oral Daily    aspirin  81 mg Oral Daily    atorvastatin  40 mg Oral QHS    carvediloL  12.5 mg Oral BID    cilostazoL  50 mg Oral BID    clopidogreL  75 mg Oral Daily    gabapentin  100 mg Oral TID    insulin detemir U-100  15 Units Subcutaneous BID    piperacillin-tazobactam (ZOSYN) IVPB  4.5 g Intravenous Q8H    polyethylene glycol  17 g Oral Daily    vancomycin (VANCOCIN) IVPB  1,000 mg Intravenous Once     PRN Meds:sodium chloride, sodium chloride, albuterol-ipratropium, bisacodyL, dextrose 10%, dextrose 10%, dextrose 10%, dextrose 10%, dextrose, dextrose, glucagon (human recombinant), glucagon (human recombinant), heparin (PORCINE), heparin (PORCINE), HYDROmorphone, insulin aspart U-100, magnesium oxide, magnesium oxide, melatonin, naloxone, ondansetron, oxyCODONE, oxyCODONE, potassium bicarbonate, potassium bicarbonate, potassium bicarbonate, potassium, sodium phosphates, potassium, sodium phosphates, potassium, sodium phosphates, prochlorperazine, simethicone, sodium chloride 0.9%, sodium chloride 0.9%, sodium chloride 0.9%, Pharmacy to dose Vancomycin consult **AND** vancomycin - pharmacy to dose     Objective:     Vital Signs (Most Recent):  Temp: 99.9 °F (37.7 °C) (03/06/23 0301)  Pulse: 86 (03/06/23 0500)  Resp: 18 (03/06/23 0500)  BP: 139/65 (03/06/23 0500)  SpO2: 97 % (03/06/23 0500) Vital Signs (24h Range):  Temp:  [98.3 °F (36.8 °C)-99.9 °F (37.7 °C)] 99.9 °F (37.7 °C)  Pulse:  [77-90] 86  Resp:  [16-39] 18  SpO2:  [91 %-100 %] 97 %  BP: ()/(48-80) 139/65         Physical Exam  Vitals reviewed.   Constitutional:       Appearance: Normal appearance.   Cardiovascular:      Rate and Rhythm: Normal rate and regular rhythm.      Comments: R biphasic DP and PT   L monophasic DP and biphasic PT  Abdominal:      General: Abdomen is  flat.      Palpations: Abdomen is soft.   Skin:     Comments: R thigh incision with dressing - not soaked, no hematoma.   L groin dressing- small portion of breakdown to center of incision   R foot s/p TMA in dressing   Neurological:      Mental Status: She is alert.       Significant Labs:  CBC:   Recent Labs   Lab 03/06/23  0420   WBC 13.01*   RBC 3.10*   HGB 8.1*   HCT 25.0*      MCV 81*   MCH 26.1*   MCHC 32.4     CMP:   Recent Labs   Lab 03/06/23 0420   GLU 90   CALCIUM 8.6*   ALBUMIN 2.1*   PROT 6.6   *   K 4.9   CO2 28   CL 96   BUN 29*   CREATININE 1.4   ALKPHOS 46*   ALT 16   AST 32   BILITOT 0.8       Significant Diagnostics:  I have reviewed all pertinent imaging results/findings within the past 24 hours.

## 2023-03-06 NOTE — PLAN OF CARE
Please merge this note with today's resident critical care note.    SICU Staff Addendum  I have reviewed and concur with the resident's history, physical, assessment, and plan.  I have personally interviewed and examined the patient at bedside.  See below for any additional findings/changes.    Reason for admission:  Critical limb ischemia with history of revascularization of same extremity  Present on Admission:   (Resolved) PVD (peripheral vascular disease)   Essential hypertension   Type 2 diabetes mellitus   Sepsis   Wet gangrene   Hyponatremia      Active issues/Goals for Today:     - Pain control is adequate.  - POD #4 s/p L iliac to R SFA bypass, R SFA PTA and POD #2 from right TMA. Continue with q4h neurovascular checks as per vascular surgery. Holding home ARB. Continue other oral antihypertensives and statin.  - Wean supplemental oxygen for SpO2 > 90%.  - Will stop cilostazol after discussion with Dr. Dunbar.  - PT/OT.  - Patient stable for transfer to the floor. Awaiting bed availability.        Shaka Pratt MD  Anesthesiology/Critical Care

## 2023-03-06 NOTE — PLAN OF CARE
"      SICU PLAN OF CARE NOTE    Dx: Critical limb ischemia with history of revascularization of same extremity    Shift Events: pt evaluated by vascular and podiatry. dressings removed, wounds evaluated, clean dressing applied. Working towards pain management goals. Pt unable to sit up in bed w/o assistance.     Goals of Care: pain control, active ROM and mobility, DM control    Neuro: AAO x4    Cardiac: NSR 70-80s    Vital Signs: BP (!) 112/56 (BP Location: Right arm, Patient Position: Lying)   Pulse 79   Temp 98.9 °F (37.2 °C) (Oral)   Resp 19   Ht 5' 4" (1.626 m)   Wt 74 kg (163 lb 2.3 oz)   LMP  (LMP Unknown)   SpO2 (!) 91%   BMI 28.00 kg/m²     Respiratory: Room Air    Diet: Diabetic Diet    Gtts: Heparin    Urine Output: Voids Spontaneously    Labs/Accuchecks: AC/HS CBG, CBG controlled majority of the day    Skin: bilat fem surgical incisions, healing well, dressing clean and dry. R toes amputation, healing well, dressing clean and dry       "

## 2023-03-06 NOTE — PLAN OF CARE
Problem: Adult Inpatient Plan of Care  Goal: Plan of Care Review  Outcome: Ongoing, Progressing  Goal: Patient-Specific Goal (Individualized)  Outcome: Ongoing, Progressing  Goal: Absence of Hospital-Acquired Illness or Injury  Outcome: Ongoing, Progressing     Problem: Adjustment to Illness (Sepsis/Septic Shock)  Goal: Optimal Coping  Outcome: Ongoing, Progressing     Problem: Bleeding (Sepsis/Septic Shock)  Goal: Absence of Bleeding  Outcome: Ongoing, Progressing     Problem: Skin Injury Risk Increased  Goal: Skin Health and Integrity  Outcome: Ongoing, Progressing     Problem: Infection  Goal: Absence of Infection Signs and Symptoms  Outcome: Ongoing, Progressing

## 2023-03-06 NOTE — PROGRESS NOTES
Pharmacokinetic Assessment Follow Up: IV Vancomycin    Vancomycin serum concentration assessment(s):    The random level was drawn correctly and can be used to guide therapy at this time. The measurement is within the desired definitive target range of 10 to 20 mcg/mL.    Vancomycin Regimen Plan:    Re-dose when the random level is less than 20 mcg/mL, next level to be drawn at 05:00 on 03/07/2023    Drug levels (last 3 results):  Recent Labs   Lab Result Units 03/04/23  0127 03/06/23  0118   Vancomycin-Trough ug/mL 6.9* 12.2       Pharmacy will continue to follow and monitor vancomycin.    Please contact pharmacy at extension [phone] for questions regarding this assessment.    Thank you for the consult,   Jude Fields       Patient brief summary:  Nelsy Marino is a 82 y.o. female initiated on antimicrobial therapy with IV Vancomycin for treatment of skin & soft tissue infection        Drug Allergies:   Review of patient's allergies indicates:   Allergen Reactions    Motrin [ibuprofen] Itching       Actual Body Weight:   74      Renal Function:   Estimated Creatinine Clearance: 30.5 mL/min (based on SCr of 1.4 mg/dL).,     Dialysis Method (if applicable):  N/A    CBC (last 72 hours):  Recent Labs   Lab Result Units 03/04/23  0415 03/05/23  0425 03/06/23  0420   WBC K/uL 11.45 12.65 13.01*   Hemoglobin g/dL 7.6* 8.5* 8.1*   Hematocrit % 24.1* 27.5* 25.0*   Platelets K/uL 225 211 240   Gran % % 82.2* 82.8* 82.0*   Lymph % % 7.9* 8.5* 9.1*   Mono % % 8.1 6.9 6.5   Eosinophil % % 1.1 1.2 1.5   Basophil % % 0.2 0.2 0.2   Differential Method  Automated Automated Automated       Metabolic Panel (last 72 hours):  Recent Labs   Lab Result Units 03/04/23  0415 03/04/23  1600 03/05/23  0425 03/06/23  0420   Sodium mmol/L 132*  --  130* 129*   Potassium mmol/L 3.6 3.6 3.9 4.9   Chloride mmol/L 100  --  97 96   CO2 mmol/L 25  --  28 28   Glucose mg/dL 208*  --  175* 90   BUN mg/dL 19  --  23 29*   Creatinine mg/dL 0.9  --  1.2  1.4   Albumin g/dL 2.1*  --  2.0* 2.1*   Total Bilirubin mg/dL 0.6  --  0.8 0.8   Alkaline Phosphatase U/L 43*  --  44* 46*   AST U/L 31  --  29 32   ALT U/L 15  --  17 16   Magnesium mg/dL  --   --  1.7  --        Vancomycin Administrations:  vancomycin given in the last 96 hours                     vancomycin 1,250 mg in dextrose 5 % (D5W) 250 mL IVPB (Vial-Mate) (mg) 1,250 mg New Bag 03/05/23 0157    vancomycin (VANCOCIN) 1,000 mg in dextrose 5 % (D5W) 250 mL IVPB (Vial-Mate) (mg) 1,000 mg New Bag 03/04/23 0233      Restarted 03/03/23 0325     1,000 mg New Bag  0317                    Microbiologic Results:  Microbiology Results (last 7 days)       Procedure Component Value Units Date/Time    Aerobic culture [[id number]] Collected: 03/04/23 1141    Order Status: Completed Specimen: Incision site from Foot, Right Updated: 03/05/23 0814     Aerobic Bacterial Culture No growth    Narrative:      3rd Metatarsal    Culture, Anaerobe [[id number]] Collected: 03/04/23 1141    Order Status: Completed Specimen: Incision site from Foot, Right Updated: 03/05/23 0708     Anaerobic Culture Culture in progress    Narrative:      3rd Metatarsal    Gram stain [[id number]] Collected: 03/04/23 1141    Order Status: Completed Specimen: Incision site from Foot, Right Updated: 03/04/23 1418     Gram Stain Result No WBC's      No organisms seen    Narrative:      3rd Metatarsal    Fungus culture [[id number]] Collected: 03/04/23 1141    Order Status: Sent Specimen: Incision site from Foot, Right Updated: 03/04/23 1204    AFB Culture & Smear [[id number]] Collected: 03/04/23 1150    Order Status: Sent Specimen: Incision site from Foot, Right Updated: 03/04/23 1150    Blood culture #2 **CANNOT BE ORDERED STAT** [[id number]] Collected: 02/27/23 1923    Order Status: Completed Specimen: Blood from Peripheral, Antecubital, Right Updated: 03/03/23 2103     Blood Culture, Routine No Growth after 4 days.    Blood culture #1 **CANNOT BE ORDERED STAT**  [[id number]] Collected: 02/27/23 1428    Order Status: Completed Specimen: Blood from Peripheral, Antecubital, Left Updated: 03/03/23 1503     Blood Culture, Routine No Growth after 4 days.

## 2023-03-06 NOTE — PROGRESS NOTES
[facility]  Critical Care - Surgery  Progress Note    Patient Name: Nelsy Marino  MRN: [id number]  Admission Date: 2/27/2023  Hospital Length of Stay: 7 days  Code Status: Full Code  Attending Provider: Danny Dunbar MD  Primary Care Provider: Juan F Garay   Principal Problem: Critical limb ischemia with history of revascularization of same extremity    Subjective:     Hospital/ICU Course:  Admitted to SICU for neurovascular monitoring. Hemodynamically stable with reassuring dopplers.      Interval History/Significant Events: NAEON. VSS. Patient stable for step down since yesterday but awaiting floor bed availability.     Follow-up For: Procedure(s) (LRB):  AMPUTATION, FOOT, TRANSMETATARSAL (Right)    Post-Operative Day: 2 Days Post-Op    Objective:     Vital Signs (Most Recent):  Temp: 99.9 °F (37.7 °C) (03/06/23 0301)  Pulse: 86 (03/06/23 0500)  Resp: 18 (03/06/23 0500)  BP: 139/65 (03/06/23 0500)  SpO2: 97 % (03/06/23 0500) Vital Signs (24h Range):  Temp:  [98.3 °F (36.8 °C)-99.9 °F (37.7 °C)] 99.9 °F (37.7 °C)  Pulse:  [77-91] 86  Resp:  [16-39] 18  SpO2:  [86 %-100 %] 97 %  BP: ()/(48-80) 139/65     Weight: 74 kg (163 lb 2.3 oz)  Body mass index is 28 kg/m².      Intake/Output Summary (Last 24 hours) at 3/6/2023 0615  Last data filed at 3/6/2023 0500  Gross per 24 hour   Intake 477.97 ml   Output 100 ml   Net 377.97 ml       Physical Exam  Vitals and nursing note reviewed.   Constitutional:       General: She is not in acute distress.     Appearance: Normal appearance. She is obese.   HENT:      Head: Normocephalic and atraumatic.      Nose: No congestion.      Mouth/Throat:      Mouth: Mucous membranes are moist.   Cardiovascular:      Rate and Rhythm: Normal rate and regular rhythm.      Pulses: Normal pulses.      Comments: Monophasic L PT.   Pulmonary:      Effort: Pulmonary effort is normal. No respiratory distress.   Abdominal:      General: Abdomen is flat.       Palpations: Abdomen is soft.      Tenderness: There is no abdominal tenderness.      Comments: Possible hematoma formation underlying R lower abdomen and R upper thigh. Skin tough and tender to palpation, however improving.    Musculoskeletal:      Right lower leg: No edema.      Left lower leg: No edema.      Comments: R foot c/d/i s/p TMA. Patient mobility in L toes remains limited.    Skin:     General: Skin is warm and dry.   Neurological:      General: No focal deficit present.      Mental Status: She is alert and oriented to person, place, and time. Mental status is at baseline.   Psychiatric:         Mood and Affect: Mood normal.         Behavior: Behavior normal.     Vents:  Oxygen Concentration (%): 24 (03/06/23 0343)    Lines/Drains/Airways       Drain  Duration             Female External Urinary Catheter 03/05/23 0132 1 day              Peripheral Intravenous Line  Duration                  Peripheral IV - Single Lumen 03/01/23 1449 20 G Left Antecubital 4 days         External Jugular IV 03/02/23 1203 3 days         Peripheral IV - Single Lumen 03/02/23 1209 18 G Left Upper Arm 3 days         Peripheral IV - Single Lumen 03/03/23 1815 20 G Anterior;Distal;Left Upper Arm 2 days                    Significant Labs:    CBC/Anemia Profile:  Recent Labs   Lab 03/05/23  0425 03/06/23  0420   WBC 12.65 13.01*   HGB 8.5* 8.1*   HCT 27.5* 25.0*    240   MCV 82 81*   RDW 14.6* 14.9*        Chemistries:  Recent Labs   Lab 03/04/23  1600 03/05/23  0425 03/06/23  0420   NA  --  130* 129*   K 3.6 3.9 4.9   CL  --  97 96   CO2  --  28 28   BUN  --  23 29*   CREATININE  --  1.2 1.4   CALCIUM  --  8.3* 8.6*   ALBUMIN  --  2.0* 2.1*   PROT  --  5.9* 6.6   BILITOT  --  0.8 0.8   ALKPHOS  --  44* 46*   ALT  --  17 16   AST  --  29 32   MG  --  1.7  --        All pertinent labs within the past 24 hours have been reviewed.    Significant Imaging:  I have reviewed all pertinent imaging results/findings within the past  24 hours.  I have reviewed and interpreted all pertinent imaging results/findings within the past 24 hours.  Assessment/Plan:     Cardiac/Vascular  * Critical limb ischemia with history of revascularization of same extremity  82-year-old female with a past medical history of severe PAD, hypertension, type 2 diabetes, history of DVT who presents with right foot pain.  She underwent fem fem bypass on 3/2. Right SFA baloon stent placed. She was admitted to SICU for q1hr neurovascular monitoring after large vessel revascularization. S/p R foot transmetatarsal amputation  with podiatry 3/4. Ready for step down.     Neuro/Psych:   -- Sedation: none  -- Pain: scheduled tylenol; oxy and dilaudid prn               CV:   -- SP revascularization of R LE sp SFA stenting      - On low intensity heparin drip   -  Q2 hr nvc  -- HTN   - continue home medications (amlodipine, carvedilol)  -- Severe PAD   - aspirin, plavix, statin  -- HDS       Pulm:   -- Goal O2 sat > 90%  -- Wean as able      Renal:  -- BUN/Cr 19 / 0.9  -- UOP low past 24 hrs, 500 cc LR bolus, will monitor.   -- Monitor UOP      FEN / GI:   -- Net +380cc/24hr  -- Replace lytes as needed  -- Nutrition: diabetic diet  -- no mIVF      ID:   -- Tm: Afebrile; mild leukocytosis at 13 this AM, borderline.   -- D/C vanc, zosyn given now have source control.   -- No current signs of sepsis, will continue to monitor      Heme/Onc:   -- Hgb stable at 8.1  -- Daily CBC      Endo:   -- Gluc goal 140-180  -- POCT Glucose Q ACHS  -- Continue subQ insulin therapy   --SSI high dose      PPx:   Feeding: diabetic diet  Analgesia/Sedation: controlled on scheduled tylenol, with oxy prn  Thromboembolic prevention: on hep drip  HOB >30: yes  Stress Ulcer ppx: n/a  Glucose control: Critical care goal 140-180 g/dl  Bowel reg:   Invasive Lines/Drains/Airway:   Pure wick  18G x1  20G x2  Deescalation:       Dispo/Code Status/Palliative:   -- SICU / Full Code  -- Stepdown  today        Critical secondary to Patient has a condition that poses threat to life and bodily function: q2hr neurovasc checks.      Critical care was time spent personally by me on the following activities: development of treatment plan with patient or surrogate and bedside caregivers, discussions with consultants, evaluation of patient's response to treatment, examination of patient, ordering and performing treatments and interventions, ordering and review of laboratory studies, ordering and review of radiographic studies, pulse oximetry, re-evaluation of patient's condition.  This critical care time did not overlap with that of any other provider or involve time for any procedures.     Radha Stevens MD  Critical Care - Surgery  [facility]     Interpolation Flap Text: A decision was made to reconstruct the defect utilizing an interpolation axial flap and a staged reconstruction.  A telfa template was made of the defect.  This telfa template was then used to outline the interpolation flap.  The donor area for the pedicle flap was then injected with anesthesia.  The flap was excised through the skin and subcutaneous tissue down to the layer of the underlying musculature.  The interpolation flap was carefully excised within this deep plane to maintain its blood supply.  The edges of the donor site were undermined.   The donor site was closed in a primary fashion.  The pedicle was then rotated into position and sutured.  Once the tube was sutured into place, adequate blood supply was confirmed with blanching and refill.  The pedicle was then wrapped with xeroform gauze and dressed appropriately with a telfa and gauze bandage to ensure continued blood supply and protect the attached pedicle.

## 2023-03-06 NOTE — ASSESSMENT & PLAN NOTE
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 Vascular did bypass procedure for RLE on 3/2. Now s/p right foot tma pod 1  - Sutures are intact without dehiscence  - Dressings applied to patient today: xeroform, betadine 4x4 gauze, kerlix, and ace wraps loosely  - Recommend heel offloading boots while patient is laying in bed  - Patient okay to weight bear as tolerated in post off shoe to right foot  - Podiatry will follow

## 2023-03-06 NOTE — PLAN OF CARE
[facility]  Initial Discharge Assessment       Primary Care Provider: Juan F Garay    Admission Diagnosis: Preop cardiovascular exam [Z01.810]  Wound infection [T14.8XXA, L08.9]  Right foot pain [M79.671]  Dry gangrene [I96]  Diabetic foot infection [E11.628, L08.9]  Chest pain [R07.9]    Admission Date: 2/27/2023  Expected Discharge Date:     Discharge Barriers Identified: None    Payor: HU[employer] MANAGED MEDICARE / Plan: HU[employer] MEDICARE HMO / Product Type: Capitation /     Extended Emergency Contact Information  Primary Emergency Contact: Dimas Mairno   [place]  Mobile Phone: [phone]  Relation: Spouse   needed? Yes  Secondary Emergency Contact: Mercedes Marino  Address: [address] [place] [address]           [place] [address] [facility]  Home Phone: [phone]  Relation: Sister   needed? Yes    Discharge Plan A: Home with family  Discharge Plan B: Home Health      [facility] [place] - [address]  [address]  [place] [address]  Phone: [phone] Fax: [phone]    [facility] - [facility] [address] [facility]  [address] [facility] [address]  Phone: [phone] Fax: [phone]      Initial Assessment (most recent)       Adult Discharge Assessment - 03/06/23 1311          Discharge Assessment    Assessment Type Discharge Planning Assessment     Confirmed/corrected address, phone number and insurance Yes     Confirmed Demographics Correct on Facesheet     Source of Information patient     Communicated JUAN ALBERTO with patient/caregiver Date not available/Unable to determine     Reason For Admission Critical limb ischemia with history of revascularization of same extremity     People in Home spouse     Do you expect to return to your current living situation? Yes     Do you have help at home or someone to help you manage your care at home? Yes     Who are your caregiver(s) and their phone  number(s)? Dimas Marino [phone]     Prior to hospitilization cognitive status: Alert/Oriented     Current cognitive status: Alert/Oriented     Home Layout Able to live on 1st floor     Equipment Currently Used at Home walker, rolling     Patient currently being followed by outpatient case management? No     Do you currently have service(s) that help you manage your care at home? No     Do you take prescription medications? Yes     Do you have prescription coverage? Yes     Coverage [employer] MANAGED MEDICARE - [employer] MEDICARE HMO     Is the patient taking medications as prescribed? yes     Who is going to help you get home at discharge? Dimas Marino     Are you on dialysis? No     Do you take coumadin? No     Discharge Plan A Home with family     Discharge Plan B Home Health     DME Needed Upon Discharge  other (see comments)   TBD    Discharge Plan discussed with: Patient     Discharge Barriers Identified None                        This SW met with patient and her family at bedside to complete DPA. Questions answered / contact numbers provided.  Use PREFERRED PHARMACY / BEDSIDE DELIVERY for any necessary medications at time of discharge. The patient is independent with all ADLs - does use a walker, is not on HD, BTs or home oxygen. The patient's  will be assisting with help upon discharge. The patient's  will be providing transportation home. Hospital follow up will be scheduled with PCP. Will continue to follow for course of hospitalization.    Phillip Evans LMSW  Case Management [facility]  Ext: [phone]

## 2023-03-06 NOTE — PT/OT/SLP EVAL
Physical Therapy Evaluation    Patient Name:  Nelsy Marino   MRN:  [id number]  Admit Date: 2/27/2023  Admitting Diagnosis:  Critical limb ischemia with history of revascularization of same extremity  Length of Stay: 7 days  Recent Surgery: Procedure(s) (LRB):  AMPUTATION, FOOT, TRANSMETATARSAL (Right) 2 Days Post-Op    Recommendations:     Discharge Recommendations:  nursing facility, skilled   Discharge Equipment Recommendations:  (TBD)   Barriers to discharge: Inaccessible home and Decreased caregiver support    Assessment:     Nelsy Marino is a 82 y.o. female admitted with a medical diagnosis of Critical limb ischemia with history of revascularization of same extremity. Pt found ready and willing to work with PT. Pt required Minx2 with bed mobility. Pt was able to transfer from bed to chair. Orthosis required so that pt can WBAT RLE- ordered but not present at time of session, limiting treatment. Cont to progress pt to standing activities to adjust to WBing on reduced limb and ambulating with AD PRN.    Problem List: weakness, impaired self care skills, impaired functional mobility, gait instability, decreased lower extremity function, pain  Rehab Prognosis: Good; patient would benefit from acute skilled PT services to address these deficits and reach maximum level of function.      Plan:     During this hospitalization, patient to be seen 4 x/week to address the identified rehab impairments via gait training, therapeutic activities, therapeutic exercises, neuromuscular re-education and progress towards the established goals.    Plan of Care Expires:  04/04/23    Subjective   Communicated with RN prior to session.  Patient found HOB elevated upon PT entry to room, agreeable to evaluation. Nelsy Marino's 3 family members present during session.    Chief Complaint: Post-op Problem (Pt sent over by PCP for possible gangrene left toe. Pt states she had her left foot operated on 2 months ago and is now  "experiencing pain, swelling and noticed discoloration to extremity. )    Patient/Family Comments/goals: to get better and return home  Pain/Comfort:  Pain Rating 1: other (see comments) (unrated)  Location - Side 1: Left  Location - Orientation 1: generalized  Location 1: foot  Pain Addressed 1: Reposition, Distraction  Pain Rating Post-Intervention 1: other (see comments) (unrated)    Living Environment:  Patient lives with  in a single family home with 1 [place].   Prior Level of Function:   Patient reports being independent with mobility using a Rolling Walker & with ADLs. Patient uses DME as follows: bath bench, bedside commode, walker, rolling. DME owned (not currently used): none.    Patient reports they will have assistance from , sisters upon discharge.    Objective:   Patient found with: telemetry, blood pressure cuff, pulse ox (continuous), PureWick, peripheral IV, central line     General Precautions: Standard, Cardiac fall   Orthopedic Precautions:RLE weight bearing as tolerated   Braces:     Oxygen Device: Nasal Cannula 1L  Vitals: /61   Pulse 79   Temp 98.6 °F (37 °C) (Oral)   Resp (!) 27   Ht 5' 4" (1.626 m)   Wt 74 kg (163 lb 2.3 oz)   LMP  (LMP Unknown)   SpO2 96%   BMI 28.00 kg/m²     Exams:  Cognition:   Oriented X 4 and Alert  Command following: Follows one-step commands  Difficult following commands for MMT  Fluency: clear/fluent  Hearing: Intact  Vision:  Intact visual fields    RLE ROM: WFL  RLE Strength: WFL  LLE ROM: WFL  LLE Strength: WFL except limited 2nd to pain  With informal assessment pt demo'd good strength for hip flexion and hip extension when seated    Outcome Measures:  AM-PAC 6 CLICK MOBILITY  Turning over in bed (including adjusting bedclothes, sheets and blankets)?: 3  Sitting down on and standing up from a chair with arms (e.g., wheelchair, bedside commode, etc.): 2  Moving from lying on back to sitting on the side of the bed?: 2  Moving to and from a " bed to a chair (including a wheelchair)?: 2  Need to walk in hospital room?: 2  Climbing 3-5 steps with a railing?: 1  Basic Mobility Total Score: 12     Functional Mobility:  Additional staff present: OT  Bed Mobility:  Rolling/Turning to Left: minimum assistance  Supine to Sit: minimum assistance and 2 persons; HOB elevated  Scooting anteriorly to EOB to have both feet planted on floor: moderate assistance    Sitting Balance at Edge of Bed:  Assistance Level Required: Contact Guard Assistance progressing Minimal Assistance  Time: 8 min  Postural deviations noted: slouched posture, rounded shoulders, increased kyphosis, and posterior pelvic tilt  Comments:  CGA with slouched posture and R posterior lean for static balance  Increased posterior lean with dynamic balance tasks requiring Zena  Verbal cueing to use IRENE UE to support herself in sitting    Transfers:   Bed <> Chair Transfer: Stand Pivot technique with maximal assistance and of 2 persons with no assistive device  Chair on patient's R  Pt stood 75% for transfer  Pt poorly controlled descent to chair      Gait:   Not performed 2nd WBing orthosis not in room yet      Therapeutic Activities, Exercises, & Education:   Pt educated on PT role and POC  Pt and family educated on WBAT status once orthosis is present    Pt performed seated hip flexion and hip extension exercises to assess readiness for WBing activities      Patient left up in chair with all lines intact, call button in reach, RN notified, and family present.    GOALS:   Multidisciplinary Problems       Physical Therapy Goals          Problem: Physical Therapy    Goal Priority Disciplines Outcome Goal Variances Interventions   Physical Therapy Goal     PT, PT/OT Ongoing, Progressing     Description: Goals to be met by: 3/20     Patient will increase functional independence with mobility by performin. Supine to sit with Stand-by Assistance  2. Rolling to Left and Right with Stand-by  Assistance.  3. Sit to stand transfer with Contact Guard Assistance using Rolling Walker  4. Gait  x 150 feet with Contact Guard Assistance using Rolling Walker.   5. Ascend/descend 1 stair with bilateral Handrails Contact Guard Assistance using Rolling Walker.                          History:     Past Medical History:   Diagnosis Date    CHF (congestive heart failure)     Diabetes mellitus     DVT (deep venous thrombosis)     Hypertension     Miscarriage        Past Surgical History:   Procedure Laterality Date     femoral vascular surgery       ANGIOGRAPHY OF LOWER EXTREMITY Right 2/23/2023    Procedure: Angiogram Extremity Unilateral;  Surgeon: Danny Dunbar MD;  Location: [facility];  Service: Vascular;  Laterality: Right;  RN PHONE PREOP 2/20/2023----CK CONSENT    CREATION OF FEMORAL-FEMORAL ARTERY BYPASS WITH GRAFT Bilateral 3/2/2023    Procedure: CREATION, BYPASS, ARTERIAL, FEMORAL TO FEMORAL, USING GRAFT, LEFT FEMORAL ENDARTERECTOMY, LEFT ILIAC ARTERY STENT PLACEMENT, RIGHT LOWER EXTREMITY ANGIOGRAM AND RIGHT SFA  ANGIOPLASTY;  Surgeon: Danny Dunbar MD;  Location: [address];  Service: Vascular;  Laterality: Bilateral;   744.47 MGY  175.36 Gycm  Flouro time 20.5 mins      CREATION OF ILIOFEMORAL ARTERY BYPASS Right 3/20/2019    Procedure: CREATION, BYPASS, ARTERIAL, ILIAC TO FEMORAL, RIGHT LOWER EXTREMITY, RIGHT PROFUNDAPLASTY;  Surgeon: Danny Dunbar MD;  Location: [facility];  Service: Vascular;  Laterality: Right;  11:00AM START PER ANNE RICHARDS PREOP 3/13/2019  ACT'S, CELL SAVER, GRAFTS, BOOK WATER---NEED H/P  CONSENT IN AM------HGBA1C    FOOT AMPUTATION THROUGH METATARSAL Right 3/4/2023    Procedure: AMPUTATION, FOOT, TRANSMETATARSAL;  Surgeon: Benjamin Godfrey DPM;  Location: [facility] OR [address] [place] [address];  Service: Podiatry;  Laterality: Right;    HYSTERECTOMY      ?unsure cervix present    INCISION AND DRAINAGE Right 4/12/2019    Procedure: Incision and Drainage - R groin washout, possible  muscle flap;  Surgeon: Danny Dunbar MD;  [facility];  Service: Vascular;  Laterality: Right;  groin washout    INCISION AND DRAINAGE OF GROIN Right 5/3/2019    Procedure: Incision and Drainage R groin;  Surgeon: Danny Dunbar MD;  Location: [facility] OR [address] [place] [address];  Service: Vascular;  Laterality: Right;       Time Tracking:     PT Received On: 03/06/23  PT Start Time: 0957     PT Stop Time: 1026  PT Total Time (min): 29 min     Billable Minutes: Evaluation 5 and Therapeutic Activity 23

## 2023-03-06 NOTE — ASSESSMENT & PLAN NOTE
82-year-old female with a past medical history of severe PAD, hypertension, type 2 diabetes, history of DVT who presents with right foot pain.  She underwent fem fem bypass on 3/2. Right SFA baloon stent placed. She was admitted to SICU for q1hr neurovascular monitoring after large vessel revascularization. S/p R foot transmetatarsal amputation  with podiatry 3/4. Ready for step down.     Neuro/Psych:   -- Sedation: none  -- Pain: scheduled tylenol; oxy and dilaudid prn               CV:   -- SP revascularization of R LE sp SFA stenting      - On low intensity heparin drip   -  Q2 hr nvc  -- HTN   - continue home medications (amlodipine, carvedilol)  -- Severe PAD   - aspirin, plavix, statin  -- HDS       Pulm:   -- Goal O2 sat > 90%  -- Wean as able      Renal:  -- BUN/Cr 19 / 0.9  -- UOP low past 24 hrs, 500 cc LR bolus, will monitor.   -- Monitor UOP      FEN / GI:   -- Net +380cc/24hr  -- Replace lytes as needed  -- Nutrition: diabetic diet  -- no mIVF      ID:   -- Tm: Afebrile; mild leukocytosis at 13 this AM, borderline.   -- D/C vanc, zosyn given now have source control.   -- No current signs of sepsis, will continue to monitor      Heme/Onc:   -- Hgb stable at 8.1  -- Daily CBC      Endo:   -- Gluc goal 140-180  -- POCT Glucose Q ACHS  -- Continue subQ insulin therapy   --SSI high dose      PPx:   Feeding: diabetic diet  Analgesia/Sedation: controlled on scheduled tylenol, with oxy prn  Thromboembolic prevention: on hep drip  HOB >30: yes  Stress Ulcer ppx: n/a  Glucose control: Critical care goal 140-180 g/dl  Bowel reg:   Invasive Lines/Drains/Airway:   Pure wick  18G x1  20G x2  Deescalation:       Dispo/Code Status/Palliative:   -- SICU / Full Code  -- Stepdown today

## 2023-03-06 NOTE — SUBJECTIVE & OBJECTIVE
Interval History/Significant Events: NAEON. VSS. Patient stable for step down since yesterday but awaiting floor bed availability.     Follow-up For: Procedure(s) (LRB):  AMPUTATION, FOOT, TRANSMETATARSAL (Right)    Post-Operative Day: 2 Days Post-Op    Objective:     Vital Signs (Most Recent):  Temp: 99.9 °F (37.7 °C) (03/06/23 0301)  Pulse: 86 (03/06/23 0500)  Resp: 18 (03/06/23 0500)  BP: 139/65 (03/06/23 0500)  SpO2: 97 % (03/06/23 0500) Vital Signs (24h Range):  Temp:  [98.3 °F (36.8 °C)-99.9 °F (37.7 °C)] 99.9 °F (37.7 °C)  Pulse:  [77-91] 86  Resp:  [16-39] 18  SpO2:  [86 %-100 %] 97 %  BP: ()/(48-80) 139/65     Weight: 74 kg (163 lb 2.3 oz)  Body mass index is 28 kg/m².      Intake/Output Summary (Last 24 hours) at 3/6/2023 0615  Last data filed at 3/6/2023 0500  Gross per 24 hour   Intake 477.97 ml   Output 100 ml   Net 377.97 ml       Physical Exam  Vitals and nursing note reviewed.   Constitutional:       General: She is not in acute distress.     Appearance: Normal appearance. She is obese.   HENT:      Head: Normocephalic and atraumatic.      Nose: No congestion.      Mouth/Throat:      Mouth: Mucous membranes are moist.   Cardiovascular:      Rate and Rhythm: Normal rate and regular rhythm.      Pulses: Normal pulses.      Comments: Monophasic L PT.   Pulmonary:      Effort: Pulmonary effort is normal. No respiratory distress.   Abdominal:      General: Abdomen is flat.      Palpations: Abdomen is soft.      Tenderness: There is no abdominal tenderness.      Comments: Possible hematoma formation underlying R lower abdomen and R upper thigh. Skin tough and tender to palpation, however improving.    Musculoskeletal:      Right lower leg: No edema.      Left lower leg: No edema.      Comments: R foot c/d/i s/p TMA. Patient mobility in L toes remains limited.    Skin:     General: Skin is warm and dry.   Neurological:      General: No focal deficit present.      Mental Status: She is alert and  oriented to person, place, and time. Mental status is at baseline.   Psychiatric:         Mood and Affect: Mood normal.         Behavior: Behavior normal.     Vents:  Oxygen Concentration (%): 24 (03/06/23 0343)    Lines/Drains/Airways       Drain  Duration             Female External Urinary Catheter 03/05/23 0132 1 day              Peripheral Intravenous Line  Duration                  Peripheral IV - Single Lumen 03/01/23 1449 20 G Left Antecubital 4 days         External Jugular IV 03/02/23 1203 3 days         Peripheral IV - Single Lumen 03/02/23 1209 18 G Left Upper Arm 3 days         Peripheral IV - Single Lumen 03/03/23 1815 20 G Anterior;Distal;Left Upper Arm 2 days                    Significant Labs:    CBC/Anemia Profile:  Recent Labs   Lab 03/05/23  0425 03/06/23  0420   WBC 12.65 13.01*   HGB 8.5* 8.1*   HCT 27.5* 25.0*    240   MCV 82 81*   RDW 14.6* 14.9*        Chemistries:  Recent Labs   Lab 03/04/23  1600 03/05/23  0425 03/06/23  0420   NA  --  130* 129*   K 3.6 3.9 4.9   CL  --  97 96   CO2  --  28 28   BUN  --  23 29*   CREATININE  --  1.2 1.4   CALCIUM  --  8.3* 8.6*   ALBUMIN  --  2.0* 2.1*   PROT  --  5.9* 6.6   BILITOT  --  0.8 0.8   ALKPHOS  --  44* 46*   ALT  --  17 16   AST  --  29 32   MG  --  1.7  --        All pertinent labs within the past 24 hours have been reviewed.    Significant Imaging:  I have reviewed all pertinent imaging results/findings within the past 24 hours.  I have reviewed and interpreted all pertinent imaging results/findings within the past 24 hours.

## 2023-03-06 NOTE — PROGRESS NOTES
Therapy with vancomycin was discontinued by the provider.  Pharmacy will sign off, please re-consult as needed.    Thank you for the consult,   Iesha Humphrey, PharmD, [facility]  [phone]

## 2023-03-06 NOTE — ASSESSMENT & PLAN NOTE
Nelsy Marino is a 82 y.o. lady with severe PAD    - s/p TMA 3/4  - Q4H neurovasc checks  - ok for diet  - antibiotics per medicine  - Continue Heparin gtt, switch to eliquis today 5mg BID  - ASA/plavix/Statin  - PT/OT  - Podiatry following  - stepdown to medicin today

## 2023-03-06 NOTE — PLAN OF CARE
Problem: Occupational Therapy  Goal: Occupational Therapy Goal  Description: Goals to be met by: 3/20/23     Patient will increase functional independence with ADLs by performing:    Feeding with [place].  UE Dressing with [place].  LE Dressing with Supervision.  Grooming while standing at sink with Stand-by Assistance.  Toileting from toilet with Stand-by Assistance for hygiene and clothing management.   Toilet transfer to toilet with Stand-by Assistance.    Outcome: Ongoing, Progressing

## 2023-03-06 NOTE — PT/OT/SLP EVAL
Occupational Therapy   Co-Evaluation/Treatment with PT    Name: Nelsy Marino  MRN: [id number]  Admitting Diagnosis: Critical limb ischemia with history of revascularization of same extremity  Recent Surgery: Procedure(s) (LRB):  AMPUTATION, FOOT, TRANSMETATARSAL (Right) 2 Days Post-Op    Recommendations:     Discharge Recommendations: nursing facility, skilled  Discharge Equipment Recommendations:   (TBD)  Barriers to discharge:  None    Assessment:     Nelsy Marino is a 82 y.o. female with a medical diagnosis of Critical limb ischemia with history of revascularization of same extremity.  She presents with c/o of L LE pain. Pt tolerated session well, however is performing functional tasks below baseline. Pt is s/p R TMA 3/4/23. Pt limited this session 2' surgical shoe not at bedside.  Performance deficits affecting function: weakness, impaired endurance, impaired self care skills, impaired functional mobility, gait instability, impaired balance, decreased lower extremity function, pain.  Pt would benefit from skilled OT services in order to maximize independence with ADLs and facilitate safe discharge. Pt would benefit from SNF upon discharge to return to [facility] and decrease burden of care.     Rehab Prognosis: Good; patient would benefit from acute skilled OT services to address these deficits and reach maximum level of function.       Plan:     Patient to be seen 4 x/week to address the above listed problems via self-care/home management, therapeutic activities, therapeutic exercises, neuromuscular re-education  Plan of Care Expires: 04/05/23  Plan of Care Reviewed with: patient, family    Subjective     Chief Complaint: L LE pain  Patient/Family Comments/goals: to return home    Occupational Profile:  Living Environment: Pt and her  live in a [facility] with [facility]. She has a tub/shower with a shower chair  Previous level of function: independent with ADLs and uses a RW for mobility  Roles and Routines: home body,  sister, wife  Equipment Used at Home: walker, rolling, bedside commode, shower chair  Assistance upon Discharge: Upon discharge, pt will have assistance from family    Pain/Comfort:  Pain Rating 1:  (unrated L LE)  Pain Addressed 1: Reposition, Distraction    Patients cultural, spiritual, Church conflicts given the current situation: no    Objective:     Communicated with: RN and PT prior to session.  Patient found HOB elevated with telemetry, blood pressure cuff, pulse ox (continuous), PureWick, peripheral IV, central line, oxygen upon OT entry to room.    General Precautions: Standard, fall  Orthopedic Precautions: RLE weight bearing as tolerated (with [place])  Braces:  (Darco; not at bedside)//  Respiratory Status: Nasal cannula, flow 1 L/min    Occupational Performance:    Bed Mobility:    Patient completed Scooting/Bridging with minimum assistance  Patient completed Supine to Sit with minimum assistance and 2 persons    Functional Mobility/Transfers:  Patient completed Sit <> Stand Transfer with maximal assistance  with  hand-held assist   Patient completed Bed <> Chair Transfer using Stand Pivot technique with maximal assistance and of 2 persons with hand-held assist  Functional Mobility: NT 2' surgical shoe not at bedside    Activities of Daily Living:  Grooming: stand by assistance to perform facial hygiene seated in bedside chair  Upper Body Dressing: maximal assistance to doff soiled gown/don clean gown seated in bedside chair  Toileting: total assistance pt found soiled in urine 2' purewick malfunctioning. Linen changed and purewick replaced upon OT exit. RN notified.    Cognitive/Visual Perceptual:  Cognitive/Psychosocial Skills:     -       Oriented to: Person, Place, Time, and Situation   -       Follows Commands/attention:Follows one-step commands  -       Communication: clear/fluent  -       Memory: No Deficits noted  -       Safety awareness/insight to disability: impaired   -        Mood/Affect/Coping skills/emotional control: Appropriate to situation    Physical Exam:  Balance:    -       8 min EOB with CGA-min A  Sensation:    -       Intact  Upper Extremity Range of Motion:     -       Right Upper Extremity: WFL  -       Left Upper Extremity: WFL  Upper Extremity Strength:    -       Right Upper Extremity: WFL  -       Left Upper Extremity: WFL   Strength:    -       Right Upper Extremity: WFL  -       Left Upper Extremity: WFL  Fine Motor Coordination:    -       Intact    AMPAC 6 Click ADL:  AMPAC Total Score: 13    Treatment & Education:  -Therapist provided facilitation and instruction of proper body mechanics, energy conservation, and fall prevention strategies during tasks listed above.  -Pt educated on role of OT, POC and goals for therapy  -Pt educated on WB status  -Pt educated on importance of OOB activities with staff member assistance and sitting OOB majority of the day.   -Pt verbalized understanding. Pt expressed no further concerns/questions  -Whiteboard updated   Evaluation completed with PT to best establish plan of care for acute setting.     Patient left up in chair with all lines intact, call button in reach, RN notified, and family present    GOALS:   Multidisciplinary Problems       Occupational Therapy Goals          Problem: Occupational Therapy    Goal Priority Disciplines Outcome Interventions   Occupational Therapy Goal     OT, PT/OT Ongoing, Progressing    Description: Goals to be met by: 3/20/23     Patient will increase functional independence with ADLs by performing:    Feeding with [place].  UE Dressing with [place].  LE Dressing with Supervision.  Grooming while standing at sink with Stand-by Assistance.  Toileting from toilet with Stand-by Assistance for hygiene and clothing management.   Toilet transfer to toilet with Stand-by Assistance.                         History:     Past Medical History:   Diagnosis Date    CHF (congestive heart  failure)     Diabetes mellitus     DVT (deep venous thrombosis)     Hypertension     Miscarriage          Past Surgical History:   Procedure Laterality Date     femoral vascular surgery       ANGIOGRAPHY OF LOWER EXTREMITY Right 2/23/2023    Procedure: Angiogram Extremity Unilateral;  Surgeon: Danny Dunbar MD;  Location: [facility];  Service: Vascular;  Laterality: Right;  RN PHONE PREOP 2/20/2023----CK CONSENT    CREATION OF FEMORAL-FEMORAL ARTERY BYPASS WITH GRAFT Bilateral 3/2/2023    Procedure: CREATION, BYPASS, ARTERIAL, FEMORAL TO FEMORAL, USING GRAFT, LEFT FEMORAL ENDARTERECTOMY, LEFT ILIAC ARTERY STENT PLACEMENT, RIGHT LOWER EXTREMITY ANGIOGRAM AND RIGHT SFA  ANGIOPLASTY;  Surgeon: Danny Dunbar MD;  Location: [facility] OR [address] [place] [address];  Service: Vascular;  Laterality: Bilateral;   744.47 MGY  175.36 Gycm  Flouro time 20.5 mins      CREATION OF ILIOFEMORAL ARTERY BYPASS Right 3/20/2019    Procedure: CREATION, BYPASS, ARTERIAL, ILIAC TO FEMORAL, RIGHT LOWER EXTREMITY, RIGHT PROFUNDAPLASTY;  Surgeon: Danny Dunbar MD;  Location: [facility] OR;  Service: Vascular;  Laterality: Right;  11:00AM START PER ANNE  RN PREOP 3/13/2019  ACT'S, CELL SAVER, GRAFTS, BOOK WATER---NEED H/P  CONSENT IN AM------HGBA1C    FOOT AMPUTATION THROUGH METATARSAL Right 3/4/2023    Procedure: AMPUTATION, FOOT, TRANSMETATARSAL;  Surgeon: Benjamin Godfrey DPM;  Location: [facility] OR [address] [place] [address];  Service: Podiatry;  Laterality: Right;    HYSTERECTOMY      ?unsure cervix present    INCISION AND DRAINAGE Right 4/12/2019    Procedure: Incision and Drainage - R groin washout, possible muscle flap;  Surgeon: Danny Dunbar MD;  Location: [facility] OR [address] [place] [address];  Service: Vascular;  Laterality: Right;  groin washout    INCISION AND DRAINAGE OF GROIN Right 5/3/2019    Procedure: Incision and Drainage R groin;  Surgeon: Danny Dunbar MD;  Location: [facility] OR [address] [place] [address];  Service: Vascular;  Laterality: Right;       Time Tracking:     OT Date of  Treatment: 03/06/23  OT Start Time: 0957  OT Stop Time: 1027  OT Total Time (min): 30 min    Billable Minutes:Evaluation 7  Self Care/Home Management 23    3/6/2023

## 2023-03-06 NOTE — PLAN OF CARE
Problem: Physical Therapy  Goal: Physical Therapy Goal  Description: Goals to be met by: 3/20     Patient will increase functional independence with mobility by performin. Supine to sit with Stand-by Assistance  2. Rolling to Left and Right with Stand-by Assistance.  3. Sit to stand transfer with Contact Guard Assistance using Rolling Walker  4. Gait  x 50 feet with Contact Guard Assistance using Rolling Walker.   5. Ascend/descend 1 stair with bilateral Handrails Contact Guard Assistance using Rolling Walker.     Outcome: Ongoing, Progressing     Eval completed and goals appropriate.

## 2023-03-06 NOTE — PROGRESS NOTES
[facility]  Podiatry  Progress Note    Patient Name: Nelsy Marino  MRN: [id number]  Admission Date: 2/27/2023  Hospital Length of Stay: 6 days  Attending Physician: Danny Dunbar MD  Primary Care Provider: [facility]     Subjective:     Interval History: Patient is seen laying in bed. Patient admits to pain to her rigth foot. Patient denies any fever chills nausea or vomiting.    Follow-up For: Procedure(s) (LRB):  AMPUTATION, FOOT, TRANSMETATARSAL (Right)    Post-Operative Day: 1 Day Post-Op    Scheduled Meds:   acetaminophen  1,000 mg Oral Q8H    amLODIPine  10 mg Oral Daily    aspirin  81 mg Oral Daily    atorvastatin  40 mg Oral QHS    carvediloL  12.5 mg Oral BID    cilostazoL  50 mg Oral BID    [START ON 3/6/2023] clopidogreL  75 mg Oral Daily    gabapentin  100 mg Oral TID    insulin detemir U-100  15 Units Subcutaneous BID    piperacillin-tazobactam (ZOSYN) IVPB  4.5 g Intravenous Q8H    polyethylene glycol  17 g Oral Daily     Continuous Infusions:   heparin (porcine) in D5W 15 Units/kg/hr (03/05/23 1941)     PRN Meds:sodium chloride, sodium chloride, albuterol-ipratropium, bisacodyL, dextrose 10%, dextrose 10%, dextrose 10%, dextrose 10%, dextrose, dextrose, glucagon (human recombinant), glucagon (human recombinant), heparin (PORCINE), heparin (PORCINE), HYDROmorphone, insulin aspart U-100, magnesium oxide, magnesium oxide, melatonin, naloxone, ondansetron, oxyCODONE, oxyCODONE, potassium bicarbonate, potassium bicarbonate, potassium bicarbonate, potassium, sodium phosphates, potassium, sodium phosphates, potassium, sodium phosphates, prochlorperazine, simethicone, sodium chloride 0.9%, sodium chloride 0.9%, sodium chloride 0.9%, Pharmacy to dose Vancomycin consult **AND** vancomycin - pharmacy to dose    Review of Systems   Constitutional:  Negative for chills and fever.   HENT:  Negative for hearing loss and trouble swallowing.    Eyes:  Negative for  discharge and visual disturbance.   Respiratory:  Negative for chest tightness and shortness of breath.    Cardiovascular:  Negative for chest pain and palpitations.   Gastrointestinal:  Negative for abdominal distention, abdominal pain, nausea and vomiting.   Genitourinary:  Negative for difficulty urinating and hematuria.   Musculoskeletal:  Negative for arthralgias and back pain.   Skin:  Positive for color change and wound. Negative for rash.   Neurological:  Negative for dizziness and headaches.   Objective:     Vital Signs (Most Recent):  Temp: 98.9 °F (37.2 °C) (03/05/23 1600)  Pulse: 81 (03/05/23 2000)  Resp: (!) 26 (03/05/23 2000)  BP: 115/63 (03/05/23 2000)  SpO2: 96 % (03/05/23 2000)   Vital Signs (24h Range):  Temp:  [98.3 °F (36.8 °C)-99.7 °F (37.6 °C)] 98.9 °F (37.2 °C)  Pulse:  [77-95] 81  Resp:  [16-42] 26  SpO2:  [86 %-100 %] 96 %  BP: ()/(48-80) 115/63     Weight: 74 kg (163 lb 2.3 oz)  Body mass index is 28 kg/m².    Foot Exam    Right Foot/Ankle     Inspection and Palpation  Skin Exam: skin changes and ulcer;     Neurovascular  Dorsalis pedis: doppler  Posterior tibial: doppler  Saphenous nerve sensation: diminished  Tibial nerve sensation: diminished  Superficial peroneal nerve sensation: diminished  Deep peroneal nerve sensation: diminished  Sural nerve sensation: diminished    Comments  S/p rigth tma. Sutures are intact without dehiscence. No mal odor noted, or fluctuance or purulent drainage noted. Pain upon palpation of right toes. No other cardinal signs of infection noted     Left Foot/Ankle      Neurovascular  Dorsalis pedis: absent  Posterior tibial: doppler  Saphenous nerve sensation: diminished  Tibial nerve sensation: diminished  Superficial peroneal nerve sensation: diminished  Deep peroneal nerve sensation: diminished  Sural nerve sensation: diminished    Comments  No open wounds noted          Laboratory:  A1C:   Recent Labs   Lab 02/28/23  0549   HGBA1C 8.2*     Blood  Cultures: No results for input(s): LABBLOO in the last 48 hours.  CBC:   Recent Labs   Lab 03/05/23  0425   WBC 12.65   RBC 3.35*   HGB 8.5*   HCT 27.5*      MCV 82   MCH 25.4*   MCHC 30.9*     CMP:   Recent Labs   Lab 03/05/23  0425   *   CALCIUM 8.3*   ALBUMIN 2.0*   PROT 5.9*   *   K 3.9   CO2 28   CL 97   BUN 23   CREATININE 1.2   ALKPHOS 44*   ALT 17   AST 29   BILITOT 0.8     Coagulation:   Recent Labs   Lab 03/02/23  1939 03/03/23  0405 03/05/23  0505   INR 1.2  --   --    APTT 27.5   < > 42.2*    < > = values in this interval not displayed.     CRP:   Recent Labs   Lab 02/27/23  1427   CRP 56.5*     ESR:   Recent Labs   Lab 02/27/23  1427   SEDRATE 112*     Prealbumin: No results for input(s): PREALBUMIN in the last 48 hours.  Wound Cultures:   Recent Labs   Lab 03/04/23  1141   LABAERO No growth     Microbiology Results (last 7 days)       Procedure Component Value Units Date/Time    Aerobic culture [[id number]] Collected: 03/04/23 1141    Order Status: Completed Specimen: Incision site from Foot, Right Updated: 03/05/23 0814     Aerobic Bacterial Culture No growth    Narrative:      3rd Metatarsal    Culture, Anaerobe [[id number]] Collected: 03/04/23 1141    Order Status: Completed Specimen: Incision site from Foot, Right Updated: 03/05/23 0708     Anaerobic Culture Culture in progress    Narrative:      3rd Metatarsal    Gram stain [[id number]] Collected: 03/04/23 1141    Order Status: Completed Specimen: Incision site from Foot, Right Updated: 03/04/23 1418     Gram Stain Result No WBC's      No organisms seen    Narrative:      3rd Metatarsal    Fungus culture [[id number]] Collected: 03/04/23 1141    Order Status: Sent Specimen: Incision site from Foot, Right Updated: 03/04/23 1204    AFB Culture & Smear [[id number]] Collected: 03/04/23 1150    Order Status: Sent Specimen: Incision site from Foot, Right Updated: 03/04/23 1150    Blood culture #2 **CANNOT BE ORDERED STAT** [[id number]]  Collected: 02/27/23 1923    Order Status: Completed Specimen: Blood from Peripheral, Antecubital, Right Updated: 03/03/23 2103     Blood Culture, Routine No Growth after 4 days.    Blood culture #1 **CANNOT BE ORDERED STAT** [[id number]] Collected: 02/27/23 1428    Order Status: Completed Specimen: Blood from Peripheral, Antecubital, Left Updated: 03/03/23 1503     Blood Culture, Routine No Growth after 4 days.          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 Vascular did bypass procedure for RLE on 3/2. Now s/p right foot tma pod 1  - Sutures are intact without dehiscence  - Dressings applied to patient today: xeroform, betadine 4x4 gauze, kerlix, and ace wraps loosely  - Recommend heel offloading boots while patient is laying in bed  - Patient okay to weight bear as tolerated in post off shoe to right foot  - Podiatry will follow         Danisha Hanley DPM PGY-1  Podiatric Medicine & Surgery  [facility]  Secure Chat Preferred  Mobile: [phone]  Pager: [phone]

## 2023-03-06 NOTE — PROGRESS NOTES
[facility]  Vascular Surgery  Progress Note    Patient Name: Nelsy Marino  MRN: [id number]  Admission Date: 2/27/2023  Primary Care Provider: Juan F Garay    Subjective:     Interval History: Patient doing well this am, no complaints, VSS.  Groin dressings changed, TMA dressing intact.       Post-Op Info:  Procedure(s) (LRB):  AMPUTATION, FOOT, TRANSMETATARSAL (Right)   2 Days Post-Op       Medications:  Continuous Infusions:   heparin (porcine) in D5W 17 Units/kg/hr (03/06/23 0717)     Scheduled Meds:   acetaminophen  1,000 mg Oral Q8H    amLODIPine  10 mg Oral Daily    aspirin  81 mg Oral Daily    atorvastatin  40 mg Oral QHS    carvediloL  12.5 mg Oral BID    cilostazoL  50 mg Oral BID    clopidogreL  75 mg Oral Daily    gabapentin  100 mg Oral TID    insulin detemir U-100  15 Units Subcutaneous BID    piperacillin-tazobactam (ZOSYN) IVPB  4.5 g Intravenous Q8H    polyethylene glycol  17 g Oral Daily    vancomycin (VANCOCIN) IVPB  1,000 mg Intravenous Once     PRN Meds:sodium chloride, sodium chloride, albuterol-ipratropium, bisacodyL, dextrose 10%, dextrose 10%, dextrose 10%, dextrose 10%, dextrose, dextrose, glucagon (human recombinant), glucagon (human recombinant), heparin (PORCINE), heparin (PORCINE), HYDROmorphone, insulin aspart U-100, magnesium oxide, magnesium oxide, melatonin, naloxone, ondansetron, oxyCODONE, oxyCODONE, potassium bicarbonate, potassium bicarbonate, potassium bicarbonate, potassium, sodium phosphates, potassium, sodium phosphates, potassium, sodium phosphates, prochlorperazine, simethicone, sodium chloride 0.9%, sodium chloride 0.9%, sodium chloride 0.9%, Pharmacy to dose Vancomycin consult **AND** vancomycin - pharmacy to dose     Objective:     Vital Signs (Most Recent):  Temp: 99.9 °F (37.7 °C) (03/06/23 0301)  Pulse: 86 (03/06/23 0500)  Resp: 18 (03/06/23 0500)  BP: 139/65 (03/06/23 0500)  SpO2: 97 % (03/06/23 0500) Vital Signs (24h  Range):  Temp:  [98.3 °F (36.8 °C)-99.9 °F (37.7 °C)] 99.9 °F (37.7 °C)  Pulse:  [77-90] 86  Resp:  [16-39] 18  SpO2:  [91 %-100 %] 97 %  BP: ()/(48-80) 139/65         Physical Exam  Vitals reviewed.   Constitutional:       Appearance: Normal appearance.   Cardiovascular:      Rate and Rhythm: Normal rate and regular rhythm.      Comments: R biphasic DP and PT   L monophasic DP and biphasic PT  Abdominal:      General: Abdomen is flat.      Palpations: Abdomen is soft.   Skin:     Comments: R thigh incision with dressing - not soaked, no hematoma.   L groin dressing- small portion of breakdown to center of incision   R foot s/p TMA in dressing   Neurological:      Mental Status: She is alert.       Significant Labs:  CBC:   Recent Labs   Lab 03/06/23  0420   WBC 13.01*   RBC 3.10*   HGB 8.1*   HCT 25.0*      MCV 81*   MCH 26.1*   MCHC 32.4     CMP:   Recent Labs   Lab 03/06/23  0420   GLU 90   CALCIUM 8.6*   ALBUMIN 2.1*   PROT 6.6   *   K 4.9   CO2 28   CL 96   BUN 29*   CREATININE 1.4   ALKPHOS 46*   ALT 16   AST 32   BILITOT 0.8       Significant Diagnostics:  I have reviewed all pertinent imaging results/findings within the past 24 hours.    Assessment/Plan:     * Critical limb ischemia with history of revascularization of same extremity  Nelsy Marino is a 82 y.o. lady with severe PAD    - s/p TMA 3/4  - Q4H neurovasc checks  - ok for diet  - antibiotics per medicine  - Continue Heparin gtt, switch to eliquis today 5mg BID  - ASA/plavix/Statin  - PT/OT  - Podiatry following  - stepdown to medicine today        Samantha Ledesma NP  Vascular Surgery  [facility]

## 2023-03-07 PROBLEM — T82.7XXA VASCULAR GRAFT INFECTION: Status: ACTIVE | Noted: 2023-03-07

## 2023-03-07 LAB
ALBUMIN SERPL BCP-MCNC: 1.8 G/DL (ref 3.5–5.2)
ALP SERPL-CCNC: 54 U/L (ref 55–135)
ALT SERPL W/O P-5'-P-CCNC: 13 U/L (ref 10–44)
ANION GAP SERPL CALC-SCNC: 9 MMOL/L (ref 8–16)
APTT BLDCRRT: 35.4 SEC (ref 21–32)
AST SERPL-CCNC: 23 U/L (ref 10–40)
BACTERIA SPEC AEROBE CULT: NO GROWTH
BASOPHILS # BLD AUTO: 0.01 K/UL (ref 0–0.2)
BASOPHILS NFR BLD: 0.1 % (ref 0–1.9)
BILIRUB SERPL-MCNC: 0.5 MG/DL (ref 0.1–1)
BUN SERPL-MCNC: 33 MG/DL (ref 8–23)
CALCIUM SERPL-MCNC: 8.2 MG/DL (ref 8.7–10.5)
CHLORIDE SERPL-SCNC: 98 MMOL/L (ref 95–110)
CO2 SERPL-SCNC: 25 MMOL/L (ref 23–29)
CREAT SERPL-MCNC: 1.2 MG/DL (ref 0.5–1.4)
DIFFERENTIAL METHOD: ABNORMAL
EOSINOPHIL # BLD AUTO: 0.2 K/UL (ref 0–0.5)
EOSINOPHIL NFR BLD: 2.3 % (ref 0–8)
ERYTHROCYTE [DISTWIDTH] IN BLOOD BY AUTOMATED COUNT: 14.6 % (ref 11.5–14.5)
EST. GFR  (NO RACE VARIABLE): 45.2 ML/MIN/1.73 M^2
GLUCOSE SERPL-MCNC: 203 MG/DL (ref 70–110)
HCT VFR BLD AUTO: 24 % (ref 37–48.5)
HGB BLD-MCNC: 7.4 G/DL (ref 12–16)
IMM GRANULOCYTES # BLD AUTO: 0.07 K/UL (ref 0–0.04)
IMM GRANULOCYTES NFR BLD AUTO: 0.7 % (ref 0–0.5)
LYMPHOCYTES # BLD AUTO: 1 K/UL (ref 1–4.8)
LYMPHOCYTES NFR BLD: 9.9 % (ref 18–48)
MCH RBC QN AUTO: 25.3 PG (ref 27–31)
MCHC RBC AUTO-ENTMCNC: 30.8 G/DL (ref 32–36)
MCV RBC AUTO: 82 FL (ref 82–98)
MONOCYTES # BLD AUTO: 0.9 K/UL (ref 0.3–1)
MONOCYTES NFR BLD: 8.9 % (ref 4–15)
NEUTROPHILS # BLD AUTO: 8.2 K/UL (ref 1.8–7.7)
NEUTROPHILS NFR BLD: 78.1 % (ref 38–73)
NRBC BLD-RTO: 0 /100 WBC
PLATELET # BLD AUTO: 231 K/UL (ref 150–450)
PMV BLD AUTO: 10.3 FL (ref 9.2–12.9)
POCT GLUCOSE: 189 MG/DL (ref 70–110)
POCT GLUCOSE: 198 MG/DL (ref 70–110)
POCT GLUCOSE: 295 MG/DL (ref 70–110)
POTASSIUM SERPL-SCNC: 4.3 MMOL/L (ref 3.5–5.1)
PROT SERPL-MCNC: 6 G/DL (ref 6–8.4)
RBC # BLD AUTO: 2.92 M/UL (ref 4–5.4)
SODIUM SERPL-SCNC: 132 MMOL/L (ref 136–145)
WBC # BLD AUTO: 10.5 K/UL (ref 3.9–12.7)

## 2023-03-07 PROCEDURE — 25000003 PHARM REV CODE 250: Performed by: STUDENT IN AN ORGANIZED HEALTH CARE EDUCATION/TRAINING PROGRAM

## 2023-03-07 PROCEDURE — 99900035 HC TECH TIME PER 15 MIN (STAT)

## 2023-03-07 PROCEDURE — 99223 PR INITIAL HOSPITAL CARE,LEVL III: ICD-10-PCS | Mod: ,,, | Performed by: INTERNAL MEDICINE

## 2023-03-07 PROCEDURE — 99223 1ST HOSP IP/OBS HIGH 75: CPT | Mod: ,,, | Performed by: INTERNAL MEDICINE

## 2023-03-07 PROCEDURE — 85025 COMPLETE CBC W/AUTO DIFF WBC: CPT | Performed by: STUDENT IN AN ORGANIZED HEALTH CARE EDUCATION/TRAINING PROGRAM

## 2023-03-07 PROCEDURE — 11000001 HC ACUTE MED/SURG PRIVATE ROOM

## 2023-03-07 PROCEDURE — 97530 THERAPEUTIC ACTIVITIES: CPT | Mod: CQ

## 2023-03-07 PROCEDURE — 85730 THROMBOPLASTIN TIME PARTIAL: CPT | Performed by: STUDENT IN AN ORGANIZED HEALTH CARE EDUCATION/TRAINING PROGRAM

## 2023-03-07 PROCEDURE — 25000003 PHARM REV CODE 250

## 2023-03-07 PROCEDURE — 63600175 PHARM REV CODE 636 W HCPCS: Performed by: STUDENT IN AN ORGANIZED HEALTH CARE EDUCATION/TRAINING PROGRAM

## 2023-03-07 PROCEDURE — 25000003 PHARM REV CODE 250: Performed by: ANESTHESIOLOGY

## 2023-03-07 PROCEDURE — 99233 SBSQ HOSP IP/OBS HIGH 50: CPT | Mod: ,,, | Performed by: ANESTHESIOLOGY

## 2023-03-07 PROCEDURE — 27000221 HC OXYGEN, UP TO 24 HOURS

## 2023-03-07 PROCEDURE — 80053 COMPREHEN METABOLIC PANEL: CPT | Performed by: STUDENT IN AN ORGANIZED HEALTH CARE EDUCATION/TRAINING PROGRAM

## 2023-03-07 PROCEDURE — 97535 SELF CARE MNGMENT TRAINING: CPT

## 2023-03-07 PROCEDURE — 94761 N-INVAS EAR/PLS OXIMETRY MLT: CPT

## 2023-03-07 PROCEDURE — 99233 PR SUBSEQUENT HOSPITAL CARE,LEVL III: ICD-10-PCS | Mod: ,,, | Performed by: ANESTHESIOLOGY

## 2023-03-07 PROCEDURE — 63600175 PHARM REV CODE 636 W HCPCS: Performed by: ANESTHESIOLOGY

## 2023-03-07 PROCEDURE — 97110 THERAPEUTIC EXERCISES: CPT | Mod: CQ

## 2023-03-07 PROCEDURE — 25000003 PHARM REV CODE 250: Performed by: INTERNAL MEDICINE

## 2023-03-07 PROCEDURE — 97530 THERAPEUTIC ACTIVITIES: CPT

## 2023-03-07 RX ORDER — OXYCODONE HYDROCHLORIDE 5 MG/1
5 TABLET ORAL EVERY 4 HOURS PRN
Status: DISCONTINUED | OUTPATIENT
Start: 2023-03-07 | End: 2023-03-08

## 2023-03-07 RX ORDER — CLOPIDOGREL BISULFATE 75 MG/1
75 TABLET ORAL DAILY
Qty: 30 TABLET | Refills: 11 | OUTPATIENT
Start: 2023-03-08 | End: 2024-03-07

## 2023-03-07 RX ORDER — ACETAMINOPHEN 500 MG
1000 TABLET ORAL EVERY 6 HOURS PRN
Qty: 30 TABLET | Refills: 0 | OUTPATIENT
Start: 2023-03-07

## 2023-03-07 RX ORDER — OXYCODONE HYDROCHLORIDE 5 MG/1
5 TABLET ORAL EVERY 6 HOURS PRN
Qty: 12 TABLET | Refills: 0 | OUTPATIENT
Start: 2023-03-07

## 2023-03-07 RX ORDER — HYDROMORPHONE HYDROCHLORIDE 1 MG/ML
0.5 INJECTION, SOLUTION INTRAMUSCULAR; INTRAVENOUS; SUBCUTANEOUS EVERY 6 HOURS PRN
Status: DISCONTINUED | OUTPATIENT
Start: 2023-03-07 | End: 2023-03-08

## 2023-03-07 RX ORDER — ATORVASTATIN CALCIUM 40 MG/1
40 TABLET, FILM COATED ORAL NIGHTLY
Qty: 90 TABLET | Refills: 3 | OUTPATIENT
Start: 2023-03-07 | End: 2024-03-06

## 2023-03-07 RX ADMIN — CLOPIDOGREL BISULFATE 75 MG: 75 TABLET ORAL at 08:03

## 2023-03-07 RX ADMIN — HYDROMORPHONE HYDROCHLORIDE 0.5 MG: 1 INJECTION, SOLUTION INTRAMUSCULAR; INTRAVENOUS; SUBCUTANEOUS at 08:03

## 2023-03-07 RX ADMIN — PIPERACILLIN AND TAZOBACTAM 4.5 G: 4; .5 INJECTION, POWDER, LYOPHILIZED, FOR SOLUTION INTRAVENOUS; PARENTERAL at 06:03

## 2023-03-07 RX ADMIN — GABAPENTIN 100 MG: 100 CAPSULE ORAL at 08:03

## 2023-03-07 RX ADMIN — OXYCODONE HYDROCHLORIDE 5 MG: 5 TABLET ORAL at 08:03

## 2023-03-07 RX ADMIN — CARVEDILOL 12.5 MG: 12.5 TABLET, FILM COATED ORAL at 08:03

## 2023-03-07 RX ADMIN — PIPERACILLIN AND TAZOBACTAM 4.5 G: 4; .5 INJECTION, POWDER, LYOPHILIZED, FOR SOLUTION INTRAVENOUS; PARENTERAL at 08:03

## 2023-03-07 RX ADMIN — OXYCODONE HYDROCHLORIDE 5 MG: 5 TABLET ORAL at 12:03

## 2023-03-07 RX ADMIN — ACETAMINOPHEN 1000 MG: 500 TABLET ORAL at 06:03

## 2023-03-07 RX ADMIN — OXYCODONE HYDROCHLORIDE 5 MG: 5 TABLET ORAL at 06:03

## 2023-03-07 RX ADMIN — APIXABAN 5 MG: 5 TABLET, FILM COATED ORAL at 08:03

## 2023-03-07 RX ADMIN — POLYETHYLENE GLYCOL 3350 17 G: 17 POWDER, FOR SOLUTION ORAL at 08:03

## 2023-03-07 RX ADMIN — OXYCODONE HYDROCHLORIDE 5 MG: 5 TABLET ORAL at 04:03

## 2023-03-07 RX ADMIN — ATORVASTATIN CALCIUM 40 MG: 40 TABLET, FILM COATED ORAL at 08:03

## 2023-03-07 RX ADMIN — ASPIRIN 81 MG: 81 TABLET, COATED ORAL at 08:03

## 2023-03-07 RX ADMIN — ACETAMINOPHEN 1000 MG: 500 TABLET ORAL at 04:03

## 2023-03-07 RX ADMIN — INSULIN ASPART 9 UNITS: 100 INJECTION, SOLUTION INTRAVENOUS; SUBCUTANEOUS at 06:03

## 2023-03-07 RX ADMIN — INSULIN ASPART 3 UNITS: 100 INJECTION, SOLUTION INTRAVENOUS; SUBCUTANEOUS at 08:03

## 2023-03-07 RX ADMIN — ACETAMINOPHEN 1000 MG: 500 TABLET ORAL at 10:03

## 2023-03-07 RX ADMIN — INSULIN ASPART 4 UNITS: 100 INJECTION, SOLUTION INTRAVENOUS; SUBCUTANEOUS at 08:03

## 2023-03-07 RX ADMIN — HYDROMORPHONE HYDROCHLORIDE 0.5 MG: 1 INJECTION, SOLUTION INTRAMUSCULAR; INTRAVENOUS; SUBCUTANEOUS at 10:03

## 2023-03-07 RX ADMIN — AMLODIPINE BESYLATE 10 MG: 10 TABLET ORAL at 08:03

## 2023-03-07 NOTE — ASSESSMENT & PLAN NOTE
82 year old female with a history of peripheral vascular disease admitted to the hospital with gangrene of the right foot.  She is now s/p transmetatarsal amputation (TMA).  Surgical aerobic cultures of clean bone margins are negative.  Gram stain was also negative.  The anaerobic cultures are still incubating.  Pathology is pending.  At this time, there is no clear evidence that there is residual bone infection.  At this time I would favor IV zosyn for no more than 5 days to manage any residual skin/soft tissue infection.  Today would be day 3 of 5.  If she is discharged prior to the 5 days, then can switch to oral augmentin to complete her course.  Will arrange follow up in our clinic in about 2-3 weeks to follow up on results of pathology from bony clean margins.

## 2023-03-07 NOTE — PLAN OF CARE
03/07/23 0940   Post-Acute Status   Post-Acute Authorization Placement   Post-Acute Placement Status Patient declined/refused     The SW met with the patient and her sister in law at bedside to discuss therapy rec for SNF. The SW provided the patient with a D/C Booklet and a list from [facility]  SNF placement. The patient stated that she was not interested in SNF placement at this time. The patient stated that she wanted to go home once she is ready to be discharged. The patient and her sister in law inquired about additional services. The SW discussed with the patient and her sister in law home health options which are once a week visits from OT, PT, and Skilled Nursing. The SW informed the patient's care team via secure chat.     The SW will continue to follow.       Phillip Evans LMSW  Case Management [facility]  Ext: [phone]

## 2023-03-07 NOTE — CONSULTS
[facility]  Infectious Disease  Consult Note    Patient Name: Nelsy Marino  MRN: [id number]  Admission Date: 2/27/2023  Hospital Length of Stay: 8 days  Attending Physician: Danny Dunbar MD  Primary Care Provider: [facility]     Isolation Status: No active isolations    Patient information was obtained from patient, spouse/SO, relative(s), past medical records and ER records.      Inpatient consult to Infectious Diseases  Consult performed by: Nain Hopper MD  Consult ordered by: Benjamin Wiseman MD        Assessment/Plan:     ID  Vascular graft infection  Patient has a prior history of right groin wound infection following vascular bypass surgery in 2019.  She has been on cefalexin 500 mg po tid for life long suppression.  Continue this after she completes antibiotics for her right foot.    Orthopedic  Wet gangrene  82 year old female with a history of peripheral vascular disease admitted to the hospital with gangrene of the right foot.  She is now s/p transmetatarsal amputation (TMA).  Surgical aerobic cultures of clean bone margins are negative.  Gram stain was also negative.  The anaerobic cultures are still incubating.  Pathology is pending.  At this time, there is no clear evidence that there is residual bone infection.  At this time I would favor IV zosyn for no more than 5 days to manage any residual skin/soft tissue infection.  Today would be day 3 of 5.  If she is discharged prior to the 5 days, then can switch to oral augmentin to complete her course.  Will arrange follow up in our clinic in about 2-3 weeks to follow up on results of pathology from bony clean margins.          Thank you for your consult. I will sign off. Please contact us if you have any additional questions.    Nain Hopper MD  Infectious Disease  [facility]    Subjective:     Principal Problem: Critical limb ischemia with history of revascularization of same  extremity    HPI: 82 year old female with a history of HTN, DMII, DVT, CHF, PAD, DVT and a history of prior fem-fem 20 years prior that occluded s/p R iliofemoral bypass.  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  She is admitted for worsening right foot pain, swelling and discoloration following removal of an ingrown toe nail.  Patient was diagnosed with dry gangrene of the foot.  She underwent Left external iliac to right SFA bypass with 8mm PTFE on March 2.  She then underwent transmetatarsal amputation of the right foot on March 4.  Cultures from proximal bone margins were negative but the pathology is pending.  ID is consulted to assist with her care.           Past Medical History:   Diagnosis Date    CHF (congestive heart failure)     Diabetes mellitus     DVT (deep venous thrombosis)     Hypertension     Miscarriage        Past Surgical History:   Procedure Laterality Date     femoral vascular surgery       ANGIOGRAPHY OF LOWER EXTREMITY Right 2/23/2023    Procedure: Angiogram Extremity Unilateral;  Surgeon: Danny Dunbar MD;  Location: [facility] OR;  Service: Vascular;  Laterality: Right;  RN PHONE PREOP 2/20/2023----CK CONSENT    CREATION OF FEMORAL-FEMORAL ARTERY BYPASS WITH GRAFT Bilateral 3/2/2023    Procedure: CREATION, BYPASS, ARTERIAL, FEMORAL TO FEMORAL, USING GRAFT, LEFT FEMORAL ENDARTERECTOMY, LEFT ILIAC ARTERY STENT PLACEMENT, RIGHT LOWER EXTREMITY ANGIOGRAM AND RIGHT SFA  ANGIOPLASTY;  Surgeon: Danny Dunbar MD;  Location: [address];  Service: Vascular;  Laterality: Bilateral;   744.47 MGY  175.36 Gycm  Flouro time 20.5 mins      CREATION OF ILIOFEMORAL ARTERY BYPASS Right 3/20/2019    Procedure: CREATION, BYPASS, ARTERIAL, ILIAC TO FEMORAL, RIGHT LOWER EXTREMITY, RIGHT PROFUNDAPLASTY;  Surgeon: Danny Dunbar MD;  Location: [facility] OR;   "Service: Vascular;  Laterality: Right;  11:00AM START PER ANNE RICHARDS PREOP 3/13/2019  ACT'S, CELL SAVER, GRAFTS, BOOK WATER---NEED H/P  CONSENT IN AM------HGBA1C    FOOT AMPUTATION THROUGH METATARSAL Right 3/4/2023    Procedure: AMPUTATION, FOOT, TRANSMETATARSAL;  Surgeon: Benjamin Godfrey DPM;  [facility];  Service: Podiatry;  Laterality: Right;    HYSTERECTOMY      ?unsure cervix present    INCISION AND DRAINAGE Right 4/12/2019    Procedure: Incision and Drainage - R groin washout, possible muscle flap;  Surgeon: Danny Dunbar MD;  Location: [facility] OR [facility];  Service: Vascular;  Laterality: Right;  groin washout    INCISION AND DRAINAGE OF GROIN Right 5/3/2019    Procedure: Incision and Drainage R groin;  Surgeon: Danny Dunbar MD;  [facility];  Service: Vascular;  Laterality: Right;       Review of patient's allergies indicates:   Allergen Reactions    Motrin [ibuprofen] Itching       Medications:  Medications Prior to Admission   Medication Sig    carvediloL (COREG) 12.5 MG tablet Take 12.5 mg by mouth 2 (two) times daily.    amlodipine (NORVASC) 10 MG tablet Take 10 mg by mouth once daily.    aspirin (ECOTRIN) 81 MG EC tablet Take 81 mg by mouth once daily.    BLOOD SUGAR DIAGNOSTIC (TRUE METRIX GLUCOSE TEST STRIP MISC) by Misc.(Non-Drug; Combo Route) route.    cilostazol (PLETAL) 50 MG Tab Take 1 tablet (50 mg total) by mouth 2 (two) times daily. If patient tolerate medication after 4 weeks, increase dose to 100 mg twice daily.    glipiZIDE (GLUCOTROL) 10 MG tablet Take 10 mg by mouth 2 (two) times daily before meals.    insulin aspart protamine-insulin aspart (NOVOLOG 70/30) 100 unit/mL (70-30) InPn pen Inject 50 Units into the skin every morning.    insulin aspart protamine-insulin aspart (NOVOLOG 70/30) 100 unit/mL (70-30) InPn pen Inject 50 Units into the skin every evening.    insulin syringe-needle,dispos. 0.3 mL 30 gauge x 5/16" Syrg by " Misc.(Non-Drug; Combo Route) route.    losartan-hydrochlorothiazide 100-25 mg (HYZAAR) 100-25 mg per tablet Take 1 tablet by mouth once daily.    metformin (GLUCOPHAGE) 1000 MG tablet Take 1,000 mg by mouth 2 (two) times daily with meals.    ondansetron (ZOFRAN) 8 MG tablet      Antibiotics (From admission, onward)      Start     Stop Route Frequency Ordered    23 1645  piperacillin-tazobactam (ZOSYN) 4.5 g in dextrose 5 % in water (D5W) 5 % 100 mL IVPB (MB+)         -- IV Every 8 hours (non-standard times) 23 1541          Antifungals (From admission, onward)      None          Antivirals (From admission, onward)      None               There is no immunization history on file for this patient.    Family History       Problem Relation (Age of Onset)    Diabetes Father    Hypertension Father          Social History     Socioeconomic History    Marital status:    Tobacco Use    Smoking status: Former     Types: Cigarettes     Quit date: 1980     Years since quittin.2    Smokeless tobacco: Never   Substance and Sexual Activity    Alcohol use: No    Drug use: No    Sexual activity: Never     Review of Systems   All other systems reviewed and are negative.  Objective:     Vital Signs (Most Recent):  Temp: 99 °F (37.2 °C) (23 0700)  Pulse: 78 (23 1000)  Resp: (!) 22 (23 1218)  BP: (!) 118/56 (23 1000)  SpO2: 96 % (23 1000)   Vital Signs (24h Range):  Temp:  [98.4 °F (36.9 °C)-99.7 °F (37.6 °C)] 99 °F (37.2 °C)  Pulse:  [] 78  Resp:  [16-44] 22  SpO2:  [89 %-100 %] 96 %  BP: (108-146)/(53-90) 118/56     Weight: 74 kg (163 lb 2.3 oz)  Body mass index is 28 kg/m².    Estimated Creatinine Clearance: 35.6 mL/min (based on SCr of 1.2 mg/dL).    Physical Exam  Vitals and nursing note reviewed.   Constitutional:       General: She is not in acute distress.     Appearance: She is well-developed. She is not diaphoretic.   HENT:      Head: Normocephalic and  atraumatic.      Right Ear: External ear normal.      Left Ear: External ear normal.      Nose: Nose normal.      Mouth/Throat:      Pharynx: No oropharyngeal exudate.   Eyes:      General: No scleral icterus.        Right eye: No discharge.         Left eye: No discharge.      Conjunctiva/sclera: Conjunctivae normal.      Pupils: Pupils are equal, round, and reactive to light.   Neck:      Thyroid: No thyromegaly.      Vascular: No JVD.      Trachea: No tracheal deviation.   Cardiovascular:      Rate and Rhythm: Normal rate and regular rhythm.      Heart sounds: No murmur heard.    No friction rub. No gallop.   Pulmonary:      Effort: Pulmonary effort is normal. No respiratory distress.      Breath sounds: Normal breath sounds. No stridor. No wheezing or rales.   Chest:      Chest wall: No tenderness.   Abdominal:      General: Bowel sounds are normal. There is no distension.      Palpations: Abdomen is soft. There is no mass.      Tenderness: There is no abdominal tenderness. There is no guarding or rebound.   Musculoskeletal:         General: Normal range of motion.      Cervical back: Normal range of motion and neck supple.      Comments: Right foot covering with surgical dressings. Pictures reviewed.   Lymphadenopathy:      Cervical: No cervical adenopathy.   Skin:     General: Skin is warm.      Coloration: Skin is not pale.      Findings: No erythema or rash.   Neurological:      Mental Status: She is alert and oriented to person, place, and time.      Motor: No abnormal muscle tone.     Significant Labs:   Microbiology Results (last 7 days)       Procedure Component Value Units Date/Time    Aerobic culture [[id number]] Collected: 03/04/23 1141    Order Status: Completed Specimen: Incision site from Foot, Right Updated: 03/07/23 0844     Aerobic Bacterial Culture No growth    Narrative:      3rd Metatarsal    Culture, Anaerobe [[id number]] Collected: 03/04/23 1141    Order Status: Completed Specimen: Incision  site from Foot, Right Updated: 03/05/23 0708     Anaerobic Culture Culture in progress    Narrative:      3rd Metatarsal    Gram stain [[id number]] Collected: 03/04/23 1141    Order Status: Completed Specimen: Incision site from Foot, Right Updated: 03/04/23 1418     Gram Stain Result No WBC's      No organisms seen    Narrative:      3rd Metatarsal    Fungus culture [[id number]] Collected: 03/04/23 1141    Order Status: Sent Specimen: Incision site from Foot, Right Updated: 03/04/23 1204    AFB Culture & Smear [[id number]] Collected: 03/04/23 1150    Order Status: Sent Specimen: Incision site from Foot, Right Updated: 03/04/23 1150    Blood culture #2 **CANNOT BE ORDERED STAT** [[id number]] Collected: 02/27/23 1923    Order Status: Completed Specimen: Blood from Peripheral, Antecubital, Right Updated: 03/03/23 2103     Blood Culture, Routine No Growth after 4 days.    Blood culture #1 **CANNOT BE ORDERED STAT** [[id number]] Collected: 02/27/23 1428    Order Status: Completed Specimen: Blood from Peripheral, Antecubital, Left Updated: 03/03/23 1503     Blood Culture, Routine No Growth after 4 days.            Significant Imaging: I have reviewed all pertinent imaging results/findings within the past 24 hours.

## 2023-03-07 NOTE — ASSESSMENT & PLAN NOTE
Patient has a prior history of right groin wound infection following vascular bypass surgery in 2019.  She has been on cefalexin 500 mg po tid for life long suppression.  Continue this after she completes antibiotics for her right foot.

## 2023-03-07 NOTE — PLAN OF CARE
[facility]    FACILITY TRANSFER ORDERS      Patient Name: Nelsy Marino  YOB: 1940    PCP: Juan F Garay   PCP Address: [facility] [address]  PCP Phone Number: [phone]  PCP Fax: [phone]    Encounter Date: 03/07/2023    Admit to: skilled nursing facility     Vital Signs:  Routine    Diagnoses:   Active Hospital Problems    Diagnosis  POA    *Critical limb ischemia with history of revascularization of same extremity [I70.229, Z98.890]  Not Applicable    Hyponatremia [E87.1]  Yes    Wet gangrene [I96]  Yes    Hypoglycemia [E16.2]  No    Sepsis [A41.9]  Yes    History of DVT (deep vein thrombosis) [Z86.718]  Not Applicable     Chronic    Essential hypertension [I10]  Yes     Chronic    Type 2 diabetes mellitus [E11.9]  Yes     Chronic      Resolved Hospital Problems    Diagnosis Date Resolved POA    Advanced care planning/counseling discussion [Z71.89] 03/02/2023 Not Applicable    Preoperative clearance [Z01.818] 03/02/2023 Not Applicable    PVD (peripheral vascular disease) [I73.9] 03/02/2023 Yes     Chronic       Allergies:  Review of patient's allergies indicates:   Allergen Reactions    Motrin [ibuprofen] Itching       Diet: cardiac diet    Activities: Up with assistance    Goals of Care Treatment Preferences:  Code Status: Full Code      Nursing: Assistance with ambulation, using the bathroom    Labs:     CONSULTS:    Physical Therapy to evaluate and treat.  and Occupational Therapy to evaluate and treat.    MISCELLANEOUS CARE:    WOUND CARE ORDERS  Yes: Surgical Wound:  Location: Right foot     Consult ET nurse        Apply the following to wound:  - Sutures are intact without dehiscence  - Dressings applied to patient today: xeroform, betadine 4x4 gauze, kerlix, and ace wraps loosely  - Recommend heel offloading boots while patient is laying in bed  - Patient okay to weight bear as tolerated in post off shoe to right foot      Medications: Review  "discharge medications with patient and family and provide education.      Current Discharge Medication List        CONTINUE these medications which have NOT CHANGED    Details   carvediloL (COREG) 12.5 MG tablet Take 12.5 mg by mouth 2 (two) times daily.      amlodipine (NORVASC) 10 MG tablet Take 10 mg by mouth once daily.      aspirin (ECOTRIN) 81 MG EC tablet Take 81 mg by mouth once daily.      BLOOD SUGAR DIAGNOSTIC (TRUE METRIX GLUCOSE TEST STRIP MISC) by Misc.(Non-Drug; Combo Route) route.      cilostazol (PLETAL) 50 MG Tab Take 1 tablet (50 mg total) by mouth 2 (two) times daily. If patient tolerate medication after 4 weeks, increase dose to 100 mg twice daily.  Qty: 60 tablet, Refills: 11      glipiZIDE (GLUCOTROL) 10 MG tablet Take 10 mg by mouth 2 (two) times daily before meals.      !! insulin aspart protamine-insulin aspart (NOVOLOG 70/30) 100 unit/mL (70-30) InPn pen Inject 50 Units into the skin every morning.      !! insulin aspart protamine-insulin aspart (NOVOLOG 70/30) 100 unit/mL (70-30) InPn pen Inject 50 Units into the skin every evening.      insulin syringe-needle,dispos. 0.3 mL 30 gauge x 5/16" Syrg by Misc.(Non-Drug; Combo Route) route.      losartan-hydrochlorothiazide 100-25 mg (HYZAAR) 100-25 mg per tablet Take 1 tablet by mouth once daily.      metformin (GLUCOPHAGE) 1000 MG tablet Take 1,000 mg by mouth 2 (two) times daily with meals.      ondansetron (ZOFRAN) 8 MG tablet Refills: 0       !! - Potential duplicate medications found. Please discuss with provider.             Immunizations Administered as of 3/7/2023       No immunizations on file.            This patient has had both covid vaccinations    Some patients may experience side effects after vaccination.  These may include fever, headache, muscle or joint aches.  Most symptoms resolve with 24-48 hours and do not require urgent medical evaluation unless they persist for more than 72 hours or symptoms are concerning for an " unrelated medical condition.          _________________________________  Samantha Ledesma NP  03/07/2023

## 2023-03-07 NOTE — SUBJECTIVE & OBJECTIVE
Medications:  Continuous Infusions:  Scheduled Meds:   acetaminophen  1,000 mg Oral Q8H    amLODIPine  10 mg Oral Daily    apixaban  5 mg Oral BID    aspirin  81 mg Oral Daily    atorvastatin  40 mg Oral QHS    carvediloL  12.5 mg Oral BID    clopidogreL  75 mg Oral Daily    gabapentin  100 mg Oral TID    insulin detemir U-100  10 Units Subcutaneous BID    piperacillin-tazobactam (ZOSYN) IVPB  4.5 g Intravenous Q8H    polyethylene glycol  17 g Oral Daily     PRN Meds:sodium chloride, sodium chloride, albuterol-ipratropium, bisacodyL, dextrose 10%, dextrose 10%, dextrose 10%, dextrose 10%, dextrose, dextrose, glucagon (human recombinant), glucagon (human recombinant), HYDROmorphone, insulin aspart U-100, magnesium oxide, magnesium oxide, melatonin, naloxone, ondansetron, oxyCODONE, potassium bicarbonate, potassium bicarbonate, potassium bicarbonate, potassium, sodium phosphates, potassium, sodium phosphates, potassium, sodium phosphates, prochlorperazine, simethicone, sodium chloride 0.9%, sodium chloride 0.9%, sodium chloride 0.9%     Objective:     Vital Signs (Most Recent):  Temp: 99.5 °F (37.5 °C) (03/07/23 0315)  Pulse: 79 (03/07/23 0804)  Resp: (!) 34 (03/07/23 0804)  BP: 134/63 (03/07/23 0802)  SpO2: 98 % (03/07/23 0804)   Vital Signs (24h Range):  Temp:  [98.4 °F (36.9 °C)-99.7 °F (37.6 °C)] 99.5 °F (37.5 °C)  Pulse:  [] 79  Resp:  [16-44] 34  SpO2:  [89 %-100 %] 98 %  BP: ()/(46-90) 134/63         Physical Exam  Vitals reviewed.   Constitutional:       Appearance: Normal appearance.   Cardiovascular:      Rate and Rhythm: Normal rate and regular rhythm.      Comments: R biphasic DP and PT   L monophasic DP and biphasic PT  Abdominal:      General: Abdomen is flat.      Palpations: Abdomen is soft.   Skin:     Comments: R thigh incision with dressing - not soaked, no hematoma.   L groin dressing- small portion of breakdown to center of incision   R foot s/p TMA in dressing   Neurological:       Mental Status: She is alert.       Significant Labs:  CBC:   Recent Labs   Lab 03/07/23  0413   WBC 10.50   RBC 2.92*   HGB 7.4*   HCT 24.0*      MCV 82   MCH 25.3*   MCHC 30.8*     CMP:   Recent Labs   Lab 03/07/23  0413   *   CALCIUM 8.2*   ALBUMIN 1.8*   PROT 6.0   *   K 4.3   CO2 25   CL 98   BUN 33*   CREATININE 1.2   ALKPHOS 54*   ALT 13   AST 23   BILITOT 0.5       Significant Diagnostics:  I have reviewed all pertinent imaging results/findings within the past 24 hours.

## 2023-03-07 NOTE — PLAN OF CARE
EMELIA spoke with Carmenza Vick [facility] re the above patient The earliest that the patient can be see is 03/14/23 CM notified the team of the above CM informed the team we are putting out referrals to other home health agencies and we will keep her informed   EMELIA WCCASH

## 2023-03-07 NOTE — ASSESSMENT & PLAN NOTE
Nelsy Marino is a 82 y.o. lady with severe PAD    - s/p TMA 3/4  - Q4H neurovasc checks  - ok for diet  - antibiotics per medicine  - ASA/plavix/Statin/Eliquis   - PT/OT  - Podiatry following  - stepdown to medicine today    Dispo: SNF  Follow up in two weeks with Bettina, ARCHIE/TBI   DISPLAY PLAN FREE TEXT

## 2023-03-07 NOTE — PLAN OF CARE
03/07/23 1429   Post-Acute Status   Post-Acute Authorization Placement  (SNF)   Post-Acute Placement Status Referrals Sent       Provided list of facilities in-network with patient's payor plan. Notified that blast referral sent to below listed facilities from list based on proximity to home/family support:   1.[facility]  2.[facility]  3.[facility]   4. [facility]    The patient and family instructed to identify preference.    Preferred Facility: (if more than 1, listed in order of descending preference)  1.[facility] 2 [facility], 3 [facility], and 4 [facility]       If an additional preferred facility not listed above is identified, additional referral to be sent. If above facilities unable to accept, will send additional referrals to in-network providers.         2:32 PM  The SW tried to contact the state to complete the patient's LOCET. The representative informed the SW that she was being placed on the call back list due to a high volume of calls. The SW provided her name and contact information.      3:06 PM    SW completed the LOCET via phone. SW faxed [facility] to obtain the 142 for NH admission.      Phillip Evans LMSW  Case Management [facility]  Ext: [phone]

## 2023-03-07 NOTE — PLAN OF CARE
03/07/23 1349   Post-Acute Status   Post-Acute Authorization Home Health   Home Health Status Referrals Sent     The SW faxed a home health referral to [facility] via [facility] for review.       The SW will continue to follow.     Phillip Evans LMSW  Case Management [facility]  Ext: [phone]

## 2023-03-07 NOTE — PT/OT/SLP PROGRESS
"Physical Therapy Treatment    Patient Name:  Nelsy Marino   MRN:  [id number]    Recommendations:     Discharge Recommendations: nursing facility, skilled  Discharge Equipment Recommendations:  (TBD)  Barriers to discharge: Inaccessible home and Decreased caregiver support at current level of assistance    Assessment:     Nelsy Marino is a 82 y.o. female admitted with a medical diagnosis of Critical limb ischemia with history of revascularization of same extremity.  She presents with the following impairments/functional limitations: weakness, impaired endurance, impaired functional mobility, gait instability, impaired balance, decreased coordination, decreased lower extremity function, pain, decreased safety awareness.  Pt was agreeable and tolerated session fairly well. Pt completed stand pivot transfer from bed to bedside chair with maxAx2 and HHAx1. Increase time for lines management. Session limited d/t no surgical shoe present. Pt is motivated to improve and continues to benefit from therapy to improve functional endurance and strength and achieve highest level of independence prior to discharge.     Rehab Prognosis: Good; patient would benefit from acute skilled PT services to address these deficits and reach maximum level of function.    Recent Surgery: Procedure(s) (LRB):  AMPUTATION, FOOT, TRANSMETATARSAL (Right) 3 Days Post-Op    Plan:     During this hospitalization, patient to be seen 4 x/week to address the identified rehab impairments via gait training, therapeutic activities, therapeutic exercises, neuromuscular re-education and progress toward the following goals:    Plan of Care Expires:  04/04/23    Subjective     Chief Complaint: none verbalized  Patient/Family Comments/goals: "it was easier getting out [of the chair] than getting in"  Pain/Comfort:  Pain Rating 1: 0/10 (at rest)  Location - Side 1: Right  Location - Orientation 1: generalized  Location 1: foot  Pain Addressed 1: Pre-medicate for " activity, Reposition  Pain Rating Post-Intervention 1:  (did not rate)      Objective:     Communicated with RN prior to session.  Patient found supine with telemetry, peripheral IV, oxygen, PureWick, pulse ox (continuous), blood pressure cuff, central line upon PT entry to room.     General Precautions: Standard, fall  Orthopedic Precautions: RLE weight bearing as tolerated  Braces:  (surgical shoe)  Respiratory Status: Nasal cannula, flow 1 L/min     Functional Mobility:  Additional staff present: Rehab Tech   Bed Mobility:   Rolling/Turning to Left/Right: minimum assistance with bed rails   Scooting to EOB: contact guard assistance  Supine to Sit: moderate assistance; HOB elevated  2nd person present for safety and lines management   Transfers:   Sit <> Stand Transfer: maximal assistance with hand-held assist   Pt unable to achieve fully upright posture (60%)  Limited by maintaining RLE NWB d/t no surgical shoe present in room.   1x from EOB and 1x from bedside chair (elevated with pillow)  Bed <> Chair Transfer: maximal assistance and of 2 persons with hand-held assist using Stand Pivot technique   Pt maintained RLE NWB d/t no surgical shoe present  Gait:  Not performed d/t WBing orthosis not in room yet. Therapist called to reorder surgical shoe to room.   Balance:   Static/Dynamic sitting EOB balance: CGA  Sittign EOB for ~5 mins for lines management and donning gown on backside.    AM-PAC 6 CLICK MOBILITY  Turning over in bed (including adjusting bedclothes, sheets and blankets)?: 3  Sitting down on and standing up from a chair with arms (e.g., wheelchair, bedside commode, etc.): 2  Moving from lying on back to sitting on the side of the bed?: 2  Moving to and from a bed to a chair (including a wheelchair)?: 2  Need to walk in hospital room?: 2  Climbing 3-5 steps with a railing?: 1  Basic Mobility Total Score: 12     Treatment & Education:  Increase time for line management and donning brief to keep purewick  in place.  Supine BLE therex x5 reps: ankle pumps (RLE only), heel slide, quad set, and hip abd/add  Educated to perform within a pain free ROM.  Patient educated on role of therapy, goals of session, and benefits of out of bed mobility.   Instructed on use of call button and importance of calling nursing staff for assistance with mobility   Questions/concerns addressed within PTA scope of practice  Pt verbalized understanding.  Whiteboard Updated    Patient left up in chair with all lines intact, call button in reach, RN notified, and family present..    GOALS:   Multidisciplinary Problems       Physical Therapy Goals          Problem: Physical Therapy    Goal Priority Disciplines Outcome Goal Variances Interventions   Physical Therapy Goal     PT, PT/OT Ongoing, Progressing     Description: Goals to be met by: 3/20     Patient will increase functional independence with mobility by performin. Supine to sit with Stand-by Assistance  2. Rolling to Left and Right with Stand-by Assistance.  3. Sit to stand transfer with Contact Guard Assistance using Rolling Walker  4. Gait  x 50 feet with Contact Guard Assistance using Rolling Walker.   5. Ascend/descend 1 stair with bilateral Handrails Contact Guard Assistance using Rolling Walker.                          Time Tracking:     PT Received On: 23  PT Start Time: 907     PT Stop Time: 930  PT Total Time (min): 23 min     Billable Minutes: Therapeutic Activity 13 and Therapeutic Exercise 10    Treatment Type: Treatment  PT/PTA: PTA     PTA Visit Number: 2023

## 2023-03-07 NOTE — HPI
82 year old female with a history of HTN, DMII, DVT, CHF, PAD, DVT and a history of prior fem-fem 20 years prior that occluded s/p R iliofemoral bypass.  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  She is admitted for worsening right foot pain, swelling and discoloration following removal of an ingrown toe nail.  Patient was diagnosed with dry gangrene of the foot.  She underwent Left external iliac to right SFA bypass with 8mm PTFE on March 2.  She then underwent transmetatarsal amputation of the right foot on March 4.  Cultures from proximal bone margins were negative but the pathology is pending.  ID is consulted to assist with her care.

## 2023-03-07 NOTE — ASSESSMENT & PLAN NOTE
82-year-old female with a past medical history of severe PAD, hypertension, type 2 diabetes, history of DVT who presents with right foot pain.  She underwent fem fem bypass on 3/2. Right SFA baloon stent placed. She was admitted to SICU for q1hr neurovascular monitoring after large vessel revascularization. S/p R foot transmetatarsal amputation  with podiatry 3/4. Ready for step down.     Neuro/Psych:   -- Sedation: none  -- Pain: scheduled tylenol + gabapentin; oxy and dilaudid prn. Dced oxy 10's             CV:   -- SP revascularization of R LE sp SFA stenting      - eloquis 5 BID   -  Q4 hr nvc  -- HTN   - continue home medications (amlodipine, carvedilol)  -- Severe PAD   - aspirin, plavix, statin  -- HDS       Pulm:   -- Goal O2 sat > 90%  -- Wean as able      Renal:  -- BUN/Cr 33/1.2  -- UOP 950cc/24hr      FEN / GI:   -- Net +492cc/24hr  -- Replace lytes as needed  -- Nutrition: diabetic diet  -- no mIVF      ID:   -- Tm: Afebrile; WBC 10.5   -- Vanc dc'ed 3/6 after 5 day course, on day 2/4 of zosyn sp source control     -- ID consulted for osteo, appreciate any long term abx recs  -- Awaiting culture results  -- No current signs of sepsis, will continue to monitor      Heme/Onc:   -- H/h 7.4/ 24.0  -- Daily CBC      Endo:   -- Gluc goal 140-180  -- POCT Glucose Q ACHS  -- Continue subQ insulin therapy   --SSI high dose      PPx:   Feeding: diabetic diet  Analgesia/Sedation: controlled on scheduled tylenol, with oxy prn  Thromboembolic prevention: eloquis 5 BID  HOB >30: yes  Stress Ulcer ppx: n/a  Glucose control: Critical care goal 140-180 g/dl  Bowel reg: miralax, bisacodyl suppository prn  Invasive Lines/Drains/Airway:   Pure wick  18G x1  20G x2  Deescalation: dc'ed oxy 10      Dispo/Code Status/Palliative:   -- Stepdown to medicine today pending bed availability / Full Code

## 2023-03-07 NOTE — PROGRESS NOTES
[facility]  Vascular Surgery  Progress Note    Patient Name: Nelsy Marino  MRN: [id number]  Admission Date: 2/27/2023  Primary Care Provider: Juan F Garay    Subjective:     Interval History: VSS, NAREBEKAHON.  Patient okay for step down to floor, will begin looking for SNF placement.      Post-Op Info:  Procedure(s) (LRB):  AMPUTATION, FOOT, TRANSMETATARSAL (Right)   3 Days Post-Op       Medications:  Continuous Infusions:  Scheduled Meds:   acetaminophen  1,000 mg Oral Q8H    amLODIPine  10 mg Oral Daily    apixaban  5 mg Oral BID    aspirin  81 mg Oral Daily    atorvastatin  40 mg Oral QHS    carvediloL  12.5 mg Oral BID    clopidogreL  75 mg Oral Daily    gabapentin  100 mg Oral TID    insulin detemir U-100  10 Units Subcutaneous BID    piperacillin-tazobactam (ZOSYN) IVPB  4.5 g Intravenous Q8H    polyethylene glycol  17 g Oral Daily     PRN Meds:sodium chloride, sodium chloride, albuterol-ipratropium, bisacodyL, dextrose 10%, dextrose 10%, dextrose 10%, dextrose 10%, dextrose, dextrose, glucagon (human recombinant), glucagon (human recombinant), HYDROmorphone, insulin aspart U-100, magnesium oxide, magnesium oxide, melatonin, naloxone, ondansetron, oxyCODONE, potassium bicarbonate, potassium bicarbonate, potassium bicarbonate, potassium, sodium phosphates, potassium, sodium phosphates, potassium, sodium phosphates, prochlorperazine, simethicone, sodium chloride 0.9%, sodium chloride 0.9%, sodium chloride 0.9%     Objective:     Vital Signs (Most Recent):  Temp: 99.5 °F (37.5 °C) (03/07/23 0315)  Pulse: 79 (03/07/23 0804)  Resp: (!) 34 (03/07/23 0804)  BP: 134/63 (03/07/23 0802)  SpO2: 98 % (03/07/23 0804)   Vital Signs (24h Range):  Temp:  [98.4 °F (36.9 °C)-99.7 °F (37.6 °C)] 99.5 °F (37.5 °C)  Pulse:  [] 79  Resp:  [16-44] 34  SpO2:  [89 %-100 %] 98 %  BP: ()/(46-90) 134/63         Physical Exam  Vitals reviewed.   Constitutional:       Appearance: Normal  appearance.   Cardiovascular:      Rate and Rhythm: Normal rate and regular rhythm.      Comments: R biphasic DP and PT   L monophasic DP and biphasic PT  Abdominal:      General: Abdomen is flat.      Palpations: Abdomen is soft.   Skin:     Comments: R thigh incision with dressing - not soaked, no hematoma.   L groin dressing- small portion of breakdown to center of incision   R foot s/p TMA in dressing   Neurological:      Mental Status: She is alert.       Significant Labs:  CBC:   Recent Labs   Lab 03/07/23  0413   WBC 10.50   RBC 2.92*   HGB 7.4*   HCT 24.0*      MCV 82   MCH 25.3*   MCHC 30.8*     CMP:   Recent Labs   Lab 03/07/23  0413   *   CALCIUM 8.2*   ALBUMIN 1.8*   PROT 6.0   *   K 4.3   CO2 25   CL 98   BUN 33*   CREATININE 1.2   ALKPHOS 54*   ALT 13   AST 23   BILITOT 0.5       Significant Diagnostics:  I have reviewed all pertinent imaging results/findings within the past 24 hours.    Assessment/Plan:     * Critical limb ischemia with history of revascularization of same extremity  Nelsy Marino is a 82 y.o. lady with severe PAD    - s/p TMA 3/4  - Q4H neurovasc checks  - ok for diet  - antibiotics per medicine  - ASA/plavix/Statin/Eliquis   - PT/OT  - Podiatry following  - stepdown to medicine today    Dispo: SNF  Follow up in two weeks with Bettina ARCHIE/TBI        Samantha Ledesma NP  Vascular Surgery  [facility]

## 2023-03-07 NOTE — PLAN OF CARE
CM to patient room to discuss her immobility and the fact that she can not walk or care for herself she needs to go to SNF to regain her strength and mobility EMELIA spoke with patients  via telephone he stated that he wanted his wife to go to [facility]   EMELIA explained that we would [place] [facility] [place] family are in agreement SNF is the best placement at this time  EMELIA ALONSO

## 2023-03-07 NOTE — NURSING
Patient's RLE noted to be hot to the touch, pulses present. Dr Wiseman notified and to the bedside to assess. CBC and RLE US ordered at this time. Patient complaining of pain/numbness, however, patient states that this is the same pain that she has been having for the past few days.

## 2023-03-07 NOTE — SUBJECTIVE & OBJECTIVE
Interval History/Significant Events: Overnight, notified of R leg new onset warmth and swelling. Doppler present. CBC and US ordered. No evidence of deep venous thrombosis. Nonspecific fluid collection in the right groin measuring up to 5.1 cm in size. Vascular notified. Otherwise VSS, afebrile.    Follow-up For: Procedure(s) (LRB):  AMPUTATION, FOOT, TRANSMETATARSAL (Right)    Post-Operative Day: 3 Days Post-Op    Objective:     Vital Signs (Most Recent):  Temp: 99.2 °F (37.3 °C) (03/06/23 2315)  Pulse: 81 (03/07/23 0615)  Resp: (!) 22 (03/07/23 0622)  BP: 131/61 (03/07/23 0600)  SpO2: 100 % (03/07/23 0615)   Vital Signs (24h Range):  Temp:  [98.4 °F (36.9 °C)-99.7 °F (37.6 °C)] 99.2 °F (37.3 °C)  Pulse:  [] 81  Resp:  [16-44] 22  SpO2:  [89 %-100 %] 100 %  BP: ()/(46-90) 131/61     Weight: 74 kg (163 lb 2.3 oz)  Body mass index is 28 kg/m².      Intake/Output Summary (Last 24 hours) at 3/7/2023 0625  Last data filed at 3/7/2023 0400  Gross per 24 hour   Intake 1442.76 ml   Output 800 ml   Net 642.76 ml       Physical Exam  Vitals and nursing note reviewed.   Constitutional:       General: She is not in acute distress.     Appearance: Normal appearance. She is obese.   HENT:      Head: Normocephalic and atraumatic.      Nose: No congestion.      Mouth/Throat:      Mouth: Mucous membranes are moist.   Cardiovascular:      Rate and Rhythm: Normal rate and regular rhythm.      Pulses: Normal pulses.      Comments: Monophasic L PT. Monophasic R DP  Pulmonary:      Effort: Pulmonary effort is normal. No respiratory distress.   Abdominal:      General: Abdomen is flat.      Palpations: Abdomen is soft.      Tenderness: There is no abdominal tenderness.   Musculoskeletal:         General: Swelling, deformity and signs of injury present.      Comments: R TMA w dressing intact, no breakthrough bleeding appreciated   Skin:     General: Skin is warm and dry.   Neurological:      General: No focal deficit present.       Mental Status: She is alert and oriented to person, place, and time. Mental status is at baseline.   Psychiatric:         Mood and Affect: Mood normal.         Behavior: Behavior normal.       Vents:  Oxygen Concentration (%): 24 (03/06/23 0343)    Lines/Drains/Airways       Drain  Duration             Female External Urinary Catheter 03/05/23 0132 2 days              Peripheral Intravenous Line  Duration                  External Jugular IV 03/02/23 1203 4 days         Peripheral IV - Single Lumen 03/03/23 1815 20 G Anterior;Distal;Left Upper Arm 3 days                    Significant Labs:    CBC/Anemia Profile:  Recent Labs   Lab 03/06/23  0420 03/06/23 2238 03/07/23  0413   WBC 13.01* 11.00 10.50   HGB 8.1* 7.5* 7.4*   HCT 25.0* 23.7* 24.0*    245 231   MCV 81* 81* 82   RDW 14.9* 14.6* 14.6*        Chemistries:  Recent Labs   Lab 03/06/23  0420 03/07/23  0413   * 132*   K 4.9 4.3   CL 96 98   CO2 28 25   BUN 29* 33*   CREATININE 1.4 1.2   CALCIUM 8.6* 8.2*   ALBUMIN 2.1* 1.8*   PROT 6.6 6.0   BILITOT 0.8 0.5   ALKPHOS 46* 54*   ALT 16 13   AST 32 23       All pertinent labs within the past 24 hours have been reviewed.    Significant Imaging:  I have reviewed all pertinent imaging results/findings within the past 24 hours.

## 2023-03-07 NOTE — PLAN OF CARE
SICU PLAN OF CARE NOTE    Shift Events: R leg/foot noted to be hot to touch and more swollen than LLE. Ultrasound ordered and completed overnight. Dr. Wiseman aware of RLE, assessed at the bedside overnight. Pain managed with PRN medications. Awaiting boot for R foot in order for patient to be weight bearing. Patient awaiting stepdown bed.     Goals of Care: SBP < 180; Q4H neurovascular checks    Neuro: AAO x4, Follows Commands, and Moves All Extremities    Respiratory: Nasal Cannula    Diet: Diabetic Diet    Gtts: None    Urine Output: Voids Spontaneously with purewick in place. No retention issues overnight.      Labs/Accuchecks: Daily labs sent. Accuchecks ACHS.    Skin: No new skin breakdown noted. Bath given, linens changed overnight. Purewick changed multiple times throughout shift. Patient able to reposition self throughout night. RLE elevated on pillow. Patient on waffle mattress, foams in place.

## 2023-03-07 NOTE — PT/OT/SLP PROGRESS
Occupational Therapy   Treatment    Name: Nelsy Marino  MRN: [id number]  Admitting Diagnosis:  Critical limb ischemia with history of revascularization of same extremity  3 Days Post-Op    Recommendations:     Discharge Recommendations: nursing facility, skilled  Discharge Equipment Recommendations:  wheelchair, lift device, hospital bed  Barriers to discharge:   (increased level of assistance required)    Assessment:     Nelsy Marino is a 82 y.o. female with a medical diagnosis of Critical limb ischemia with history of revascularization of same extremity.  She presents with globalized weakness and deconditioning. Pt continues to require increased assistance for transfers/mobility. Surgical shoe donned upon OT arrival, however pt continues to require significant assistance for transfers, unable to achieve full stand this session. Performance deficits affecting function are weakness, impaired endurance, impaired self care skills, impaired functional mobility, gait instability, impaired balance, decreased coordination, decreased lower extremity function, pain, decreased safety awareness. Pt would benefit from skilled OT services in order to maximize independence with ADLs and facilitate safe discharge. Pt would benefit from SNF upon discharge to return to PLOF and decrease burden of care.     Rehab Prognosis:  Fair; patient would benefit from acute skilled OT services to address these deficits and reach maximum level of function.       Plan:     Patient to be seen 4 x/week to address the above listed problems via self-care/home management, therapeutic activities, therapeutic exercises, neuromuscular re-education  Plan of Care Expires: 04/05/23  Plan of Care Reviewed with: patient, family    Subjective     Pain/Comfort:  Pain Rating 1:  (unrated L LE)    Objective:     Communicated with: RN prior to session.  Patient found up in chair with telemetry, peripheral IV, PureWick, blood pressure cuff upon OT entry to  room.    General Precautions: Standard, fall    Orthopedic Precautions:RLE weight bearing as tolerated (w/ darco)  Braces:  (darco)  Respiratory Status: Room air     Occupational Performance:     Bed Mobility:    Patient completed Sit to Supine with maximal assistance and 2 persons     Functional Mobility/Transfers:  Patient completed Sit <> Stand Transfer with maximal assistance  with  no assistive device and rolling walker   Attempted transfer 2x from bedside chair 2x with RW 1x with HHA and B knees blocked  Pt unable to clear buttocks from bedside chair  Patient completed Bed > Chair Transfer using Squat Pivot technique with maximal assistance and of 2 persons with hand-held assist  Functional Mobility: unable    Activities of Daily Living:  Feeding:  independence to self feed with utensils seated in bedside chair      [facility] 6 Click ADL: 12    Treatment & Education:  -Therapist provided facilitation and instruction of proper body mechanics, energy conservation, and fall prevention strategies during tasks listed above.  -Pt educated on role of OT, POC and goals for therapy  -Discussion with pt and pt's sister educated on benefits of SNF and difference between SNF and shelter NH  -Pt educated on importance of OOB activities with staff member assistance and sitting OOB majority of the day.   -Pt verbalized understanding. Pt expressed no further concerns/questions  -Whiteboard updated     Patient left HOB elevated with all lines intact, call button in reach, and RN notified    GOALS:   Multidisciplinary Problems       Occupational Therapy Goals          Problem: Occupational Therapy    Goal Priority Disciplines Outcome Interventions   Occupational Therapy Goal     OT, PT/OT Ongoing, Progressing    Description: Goals to be met by: 3/20/23     Patient will increase functional independence with ADLs by performing:    Feeding with [place].  UE Dressing with [place].  LE Dressing with Supervision.  Grooming  while standing at sink with Stand-by Assistance.  Toileting from toilet with Stand-by Assistance for hygiene and clothing management.   Toilet transfer to toilet with Stand-by Assistance.                         Time Tracking:     OT Date of Treatment: 03/07/23  OT Start Time: 1255  OT Stop Time: 1318  OT Total Time (min): 23 min    Billable Minutes:Self Care/Home Management 8  Therapeutic Activity 15    OT/GEORGE: OT          3/7/2023

## 2023-03-07 NOTE — PROGRESS NOTES
[facility]  Critical Care - Surgery  Progress Note    Patient Name: Nelsy Marino  MRN: [id number]  Admission Date: 2/27/2023  Hospital Length of Stay: 8 days  Code Status: Full Code  Attending Provider: Danny Dunbar MD  Primary Care Provider: Juan F Garay   Principal Problem: Critical limb ischemia with history of revascularization of same extremity    Subjective:     Hospital/ICU Course:  Admitted to SICU for neurovascular monitoring. Hemodynamically stable with reassuring dopplers.      Interval History/Significant Events: Overnight, notified of R leg new onset warmth and swelling. Doppler present. CBC and US ordered. No evidence of deep venous thrombosis. Nonspecific fluid collection in the right groin measuring up to 5.1 cm in size. Vascular notified. Otherwise VSS, afebrile.    Follow-up For: Procedure(s) (LRB):  AMPUTATION, FOOT, TRANSMETATARSAL (Right)    Post-Operative Day: 3 Days Post-Op    Objective:     Vital Signs (Most Recent):  Temp: 99.2 °F (37.3 °C) (03/06/23 2315)  Pulse: 81 (03/07/23 0615)  Resp: (!) 22 (03/07/23 0622)  BP: 131/61 (03/07/23 0600)  SpO2: 100 % (03/07/23 0615)   Vital Signs (24h Range):  Temp:  [98.4 °F (36.9 °C)-99.7 °F (37.6 °C)] 99.2 °F (37.3 °C)  Pulse:  [] 81  Resp:  [16-44] 22  SpO2:  [89 %-100 %] 100 %  BP: ()/(46-90) 131/61     Weight: 74 kg (163 lb 2.3 oz)  Body mass index is 28 kg/m².      Intake/Output Summary (Last 24 hours) at 3/7/2023 0625  Last data filed at 3/7/2023 0400  Gross per 24 hour   Intake 1442.76 ml   Output 800 ml   Net 642.76 ml       Physical Exam  Vitals and nursing note reviewed.   Constitutional:       General: She is not in acute distress.     Appearance: Normal appearance. She is obese.   HENT:      Head: Normocephalic and atraumatic.      Nose: No congestion.      Mouth/Throat:      Mouth: Mucous membranes are moist.   Cardiovascular:      Rate and Rhythm: Normal rate and regular rhythm.      Pulses:  Normal pulses.      Comments: Monophasic L PT. Monophasic R DP  Pulmonary:      Effort: Pulmonary effort is normal. No respiratory distress.   Abdominal:      General: Abdomen is flat.      Palpations: Abdomen is soft.      Tenderness: There is no abdominal tenderness.   Musculoskeletal:         General: Swelling, deformity and signs of injury present.      Comments: R TMA w dressing intact, no breakthrough bleeding appreciated   Skin:     General: Skin is warm and dry.   Neurological:      General: No focal deficit present.      Mental Status: She is alert and oriented to person, place, and time. Mental status is at baseline.   Psychiatric:         Mood and Affect: Mood normal.         Behavior: Behavior normal.       Vents:  Oxygen Concentration (%): 24 (03/06/23 0343)    Lines/Drains/Airways       Drain  Duration             Female External Urinary Catheter 03/05/23 0132 2 days              Peripheral Intravenous Line  Duration                  External Jugular IV 03/02/23 1203 4 days         Peripheral IV - Single Lumen 03/03/23 1815 20 G Anterior;Distal;Left Upper Arm 3 days                    Significant Labs:    CBC/Anemia Profile:  Recent Labs   Lab 03/06/23  0420 03/06/23  2238 03/07/23  0413   WBC 13.01* 11.00 10.50   HGB 8.1* 7.5* 7.4*   HCT 25.0* 23.7* 24.0*    245 231   MCV 81* 81* 82   RDW 14.9* 14.6* 14.6*        Chemistries:  Recent Labs   Lab 03/06/23  0420 03/07/23  0413   * 132*   K 4.9 4.3   CL 96 98   CO2 28 25   BUN 29* 33*   CREATININE 1.4 1.2   CALCIUM 8.6* 8.2*   ALBUMIN 2.1* 1.8*   PROT 6.6 6.0   BILITOT 0.8 0.5   ALKPHOS 46* 54*   ALT 16 13   AST 32 23       All pertinent labs within the past 24 hours have been reviewed.    Significant Imaging:  I have reviewed all pertinent imaging results/findings within the past 24 hours.    Assessment/Plan:     Cardiac/Vascular  * Critical limb ischemia with history of revascularization of same extremity  82-year-old female with a past  medical history of severe PAD, hypertension, type 2 diabetes, history of DVT who presents with right foot pain.  She underwent fem fem bypass on 3/2. Right SFA baloon stent placed. She was admitted to SICU for q1hr neurovascular monitoring after large vessel revascularization. S/p R foot transmetatarsal amputation  with podiatry 3/4. Ready for step down.     Neuro/Psych:   -- Sedation: none  -- Pain: scheduled tylenol + gabapentin; oxy and dilaudid prn. Dced oxy 10's             CV:   -- SP revascularization of R LE sp SFA stenting      - eloquis 5 BID       -  Q4 hr nvc  -- HTN   - continue home medications (amlodipine, carvedilol)  -- Severe PAD   - aspirin, plavix, statin  -- HDS       Pulm:   -- Goal O2 sat > 90%  -- Wean as able      Renal:  -- BUN/Cr 33/1.2  -- UOP 950cc/24hr      FEN / GI:   -- Net +492cc/24hr  -- Hyponatremia is improving; Na 132 from 129   -- fluid restriction 3/6  -- Replace lytes as needed  -- Nutrition: diabetic diet  -- no mIVF      ID:   -- Tm: Afebrile; WBC 10.5   -- Vanc dc'ed 3/6 after 5 day course, on day 2/4 of zosyn sp source control     -- ID consulted for osteo, appreciate any long term abx recs  -- Awaiting culture results  -- No current signs of sepsis, will continue to monitor      Heme/Onc:   -- H/h 7.4/ 24.0  -- Daily CBC      Endo:   -- Gluc goal 140-180  -- POCT Glucose Q ACHS  -- Continue subQ insulin therapy   --SSI high dose      PPx:   Feeding: diabetic diet  Analgesia/Sedation: controlled on scheduled tylenol, with oxy prn  Thromboembolic prevention: eloquis 5 BID  HOB >30: yes  Stress Ulcer ppx: n/a  Glucose control: Critical care goal 140-180 g/dl  Bowel reg: miralax, bisacodyl suppository prn  Invasive Lines/Drains/Airway:   Pure wick  18G x1  20G x2  Deescalation: dc'ed oxy 10      Dispo/Code Status/Palliative:   -- Stepdown to medicine today pending bed availability / Full Code           Critical care was time spent personally by me on the following activities:  development of treatment plan with patient or surrogate and bedside caregivers, discussions with consultants, evaluation of patient's response to treatment, examination of patient, ordering and performing treatments and interventions, ordering and review of laboratory studies, ordering and review of radiographic studies, pulse oximetry, re-evaluation of patient's condition.  This critical care time did not overlap with that of any other provider or involve time for any procedures.     Helen Casillas MD  Critical Care - Surgery  [facility]

## 2023-03-07 NOTE — SUBJECTIVE & OBJECTIVE
Past Medical History:   Diagnosis Date    CHF (congestive heart failure)     Diabetes mellitus     DVT (deep venous thrombosis)     Hypertension     Miscarriage        Past Surgical History:   Procedure Laterality Date     femoral vascular surgery       ANGIOGRAPHY OF LOWER EXTREMITY Right 2/23/2023    Procedure: Angiogram Extremity Unilateral;  Surgeon: Danny Dunbar MD;  Location: [facility];  Service: Vascular;  Laterality: Right;  RN PHONE PREOP 2/20/2023----CK CONSENT    CREATION OF FEMORAL-FEMORAL ARTERY BYPASS WITH GRAFT Bilateral 3/2/2023    Procedure: CREATION, BYPASS, ARTERIAL, FEMORAL TO FEMORAL, USING GRAFT, LEFT FEMORAL ENDARTERECTOMY, LEFT ILIAC ARTERY STENT PLACEMENT, RIGHT LOWER EXTREMITY ANGIOGRAM AND RIGHT SFA  ANGIOPLASTY;  Surgeon: Danny Dunbar MD;  Location: [facility] OR [address] [place] [address];  Service: Vascular;  Laterality: Bilateral;   744.47 MGY  175.36 Gycm  Flouro time 20.5 mins      CREATION OF ILIOFEMORAL ARTERY BYPASS Right 3/20/2019    Procedure: CREATION, BYPASS, ARTERIAL, ILIAC TO FEMORAL, RIGHT LOWER EXTREMITY, RIGHT PROFUNDAPLASTY;  Surgeon: Danny Dunbar MD;  Location: [facility];  Service: Vascular;  Laterality: Right;  11:00AM START PER ANNE  RN PREOP 3/13/2019  ACT'S, CELL SAVER, GRAFTS, BOOK WATER---NEED H/P  CONSENT IN AM------HGBA1C    FOOT AMPUTATION THROUGH METATARSAL Right 3/4/2023    Procedure: AMPUTATION, FOOT, TRANSMETATARSAL;  Surgeon: Benjamin Godfrey DPM;  Location: [facility] OR [address] [place] [address];  Service: Podiatry;  Laterality: Right;    HYSTERECTOMY      ?unsure cervix present    INCISION AND DRAINAGE Right 4/12/2019    Procedure: Incision and Drainage - R groin washout, possible muscle flap;  Surgeon: Danny Dunbar MD;  Location: [facility] OR [address] [place] [address];  Service: Vascular;  Laterality: Right;  groin washout    INCISION AND DRAINAGE OF GROIN Right 5/3/2019    Procedure: Incision and Drainage R groin;  Surgeon: Danny Dunbar MD;  Location: [facility] OR [address] [place] [address];  Service:  "Vascular;  Laterality: Right;       Review of patient's allergies indicates:   Allergen Reactions    Motrin [ibuprofen] Itching       Medications:  Medications Prior to Admission   Medication Sig    carvediloL (COREG) 12.5 MG tablet Take 12.5 mg by mouth 2 (two) times daily.    amlodipine (NORVASC) 10 MG tablet Take 10 mg by mouth once daily.    aspirin (ECOTRIN) 81 MG EC tablet Take 81 mg by mouth once daily.    BLOOD SUGAR DIAGNOSTIC (TRUE METRIX GLUCOSE TEST STRIP MISC) by Misc.(Non-Drug; Combo Route) route.    cilostazol (PLETAL) 50 MG Tab Take 1 tablet (50 mg total) by mouth 2 (two) times daily. If patient tolerate medication after 4 weeks, increase dose to 100 mg twice daily.    glipiZIDE (GLUCOTROL) 10 MG tablet Take 10 mg by mouth 2 (two) times daily before meals.    insulin aspart protamine-insulin aspart (NOVOLOG 70/30) 100 unit/mL (70-30) InPn pen Inject 50 Units into the skin every morning.    insulin aspart protamine-insulin aspart (NOVOLOG 70/30) 100 unit/mL (70-30) InPn pen Inject 50 Units into the skin every evening.    insulin syringe-needle,dispos. 0.3 mL 30 gauge x 5/16" Syrg by Misc.(Non-Drug; Combo Route) route.    losartan-hydrochlorothiazide 100-25 mg (HYZAAR) 100-25 mg per tablet Take 1 tablet by mouth once daily.    metformin (GLUCOPHAGE) 1000 MG tablet Take 1,000 mg by mouth 2 (two) times daily with meals.    ondansetron (ZOFRAN) 8 MG tablet      Antibiotics (From admission, onward)      Start     Stop Route Frequency Ordered    03/06/23 1645  piperacillin-tazobactam (ZOSYN) 4.5 g in dextrose 5 % in water (D5W) 5 % 100 mL IVPB (MB+)         -- IV Every 8 hours (non-standard times) 03/06/23 1541          Antifungals (From admission, onward)      None          Antivirals (From admission, onward)      None               There is no immunization history on file for this patient.    Family History       Problem Relation (Age of Onset)    Diabetes Father    Hypertension Father          Social " History     Socioeconomic History    Marital status:    Tobacco Use    Smoking status: Former     Types: Cigarettes     Quit date: 1980     Years since quittin.2    Smokeless tobacco: Never   Substance and Sexual Activity    Alcohol use: No    Drug use: No    Sexual activity: Never     Review of Systems   All other systems reviewed and are negative.  Objective:     Vital Signs (Most Recent):  Temp: 99 °F (37.2 °C) (23 0700)  Pulse: 78 (23 1000)  Resp: (!) 22 (23 1218)  BP: (!) 118/56 (23 1000)  SpO2: 96 % (23 1000)   Vital Signs (24h Range):  Temp:  [98.4 °F (36.9 °C)-99.7 °F (37.6 °C)] 99 °F (37.2 °C)  Pulse:  [] 78  Resp:  [16-44] 22  SpO2:  [89 %-100 %] 96 %  BP: (108-146)/(53-90) 118/56     Weight: 74 kg (163 lb 2.3 oz)  Body mass index is 28 kg/m².    Estimated Creatinine Clearance: 35.6 mL/min (based on SCr of 1.2 mg/dL).    Physical Exam  Vitals and nursing note reviewed.   Constitutional:       General: She is not in acute distress.     Appearance: She is well-developed. She is not diaphoretic.   HENT:      Head: Normocephalic and atraumatic.      Right Ear: External ear normal.      Left Ear: External ear normal.      Nose: Nose normal.      Mouth/Throat:      Pharynx: No oropharyngeal exudate.   Eyes:      General: No scleral icterus.        Right eye: No discharge.         Left eye: No discharge.      Conjunctiva/sclera: Conjunctivae normal.      Pupils: Pupils are equal, round, and reactive to light.   Neck:      Thyroid: No thyromegaly.      Vascular: No JVD.      Trachea: No tracheal deviation.   Cardiovascular:      Rate and Rhythm: Normal rate and regular rhythm.      Heart sounds: No murmur heard.    No friction rub. No gallop.   Pulmonary:      Effort: Pulmonary effort is normal. No respiratory distress.      Breath sounds: Normal breath sounds. No stridor. No wheezing or rales.   Chest:      Chest wall: No tenderness.   Abdominal:      General: Bowel  sounds are normal. There is no distension.      Palpations: Abdomen is soft. There is no mass.      Tenderness: There is no abdominal tenderness. There is no guarding or rebound.   Musculoskeletal:         General: Normal range of motion.      Cervical back: Normal range of motion and neck supple.      Comments: Right foot covering with surgical dressings. Pictures reviewed.   Lymphadenopathy:      Cervical: No cervical adenopathy.   Skin:     General: Skin is warm.      Coloration: Skin is not pale.      Findings: No erythema or rash.   Neurological:      Mental Status: She is alert and oriented to person, place, and time.      Motor: No abnormal muscle tone.     Significant Labs:   Microbiology Results (last 7 days)       Procedure Component Value Units Date/Time    Aerobic culture [[id number]] Collected: 03/04/23 1141    Order Status: Completed Specimen: Incision site from Foot, Right Updated: 03/07/23 0844     Aerobic Bacterial Culture No growth    Narrative:      3rd Metatarsal    Culture, Anaerobe [[id number]] Collected: 03/04/23 1141    Order Status: Completed Specimen: Incision site from Foot, Right Updated: 03/05/23 0708     Anaerobic Culture Culture in progress    Narrative:      3rd Metatarsal    Gram stain [[id number]] Collected: 03/04/23 1141    Order Status: Completed Specimen: Incision site from Foot, Right Updated: 03/04/23 1418     Gram Stain Result No WBC's      No organisms seen    Narrative:      3rd Metatarsal    Fungus culture [[id number]] Collected: 03/04/23 1141    Order Status: Sent Specimen: Incision site from Foot, Right Updated: 03/04/23 1204    AFB Culture & Smear [[id number]] Collected: 03/04/23 1150    Order Status: Sent Specimen: Incision site from Foot, Right Updated: 03/04/23 1150    Blood culture #2 **CANNOT BE ORDERED STAT** [[id number]] Collected: 02/27/23 1923    Order Status: Completed Specimen: Blood from Peripheral, Antecubital, Right Updated: 03/03/23 2103     Blood Culture,  Routine No Growth after 4 days.    Blood culture #1 **CANNOT BE ORDERED STAT** [[id number]] Collected: 02/27/23 1428    Order Status: Completed Specimen: Blood from Peripheral, Antecubital, Left Updated: 03/03/23 1503     Blood Culture, Routine No Growth after 4 days.            Significant Imaging: I have reviewed all pertinent imaging results/findings within the past 24 hours.

## 2023-03-08 PROBLEM — I96 WET GANGRENE: Status: RESOLVED | Noted: 2023-03-02 | Resolved: 2023-03-08

## 2023-03-08 PROBLEM — Z89.431 S/P TRANSMETATARSAL AMPUTATION OF FOOT, RIGHT: Status: ACTIVE | Noted: 2023-03-08

## 2023-03-08 LAB
ALBUMIN SERPL BCP-MCNC: 1.8 G/DL (ref 3.5–5.2)
ALLENS TEST: ABNORMAL
ALP SERPL-CCNC: 41 U/L (ref 55–135)
ALT SERPL W/O P-5'-P-CCNC: 13 U/L (ref 10–44)
ANION GAP SERPL CALC-SCNC: 10 MMOL/L (ref 8–16)
APTT BLDCRRT: 35.1 SEC (ref 21–32)
AST SERPL-CCNC: 23 U/L (ref 10–40)
BASOPHILS # BLD AUTO: 0.02 K/UL (ref 0–0.2)
BASOPHILS NFR BLD: 0.2 % (ref 0–1.9)
BILIRUB SERPL-MCNC: 0.6 MG/DL (ref 0.1–1)
BILIRUB UR QL STRIP: NEGATIVE
BUN SERPL-MCNC: 32 MG/DL (ref 8–23)
CALCIUM SERPL-MCNC: 8.2 MG/DL (ref 8.7–10.5)
CHLORIDE SERPL-SCNC: 97 MMOL/L (ref 95–110)
CLARITY UR REFRACT.AUTO: CLEAR
CO2 SERPL-SCNC: 25 MMOL/L (ref 23–29)
COLOR UR AUTO: YELLOW
CREAT SERPL-MCNC: 1.2 MG/DL (ref 0.5–1.4)
DELSYS: ABNORMAL
DIFFERENTIAL METHOD: ABNORMAL
EOSINOPHIL # BLD AUTO: 0.3 K/UL (ref 0–0.5)
EOSINOPHIL NFR BLD: 2.6 % (ref 0–8)
ERYTHROCYTE [DISTWIDTH] IN BLOOD BY AUTOMATED COUNT: 14.4 % (ref 11.5–14.5)
EST. GFR  (NO RACE VARIABLE): 45.2 ML/MIN/1.73 M^2
GLUCOSE SERPL-MCNC: 221 MG/DL (ref 70–110)
GLUCOSE UR QL STRIP: NEGATIVE
HCO3 UR-SCNC: 28 MMOL/L (ref 24–28)
HCT VFR BLD AUTO: 25.8 % (ref 37–48.5)
HGB BLD-MCNC: 8.2 G/DL (ref 12–16)
HGB UR QL STRIP: NEGATIVE
IMM GRANULOCYTES # BLD AUTO: 0.11 K/UL (ref 0–0.04)
IMM GRANULOCYTES NFR BLD AUTO: 0.9 % (ref 0–0.5)
KETONES UR QL STRIP: ABNORMAL
LEUKOCYTE ESTERASE UR QL STRIP: NEGATIVE
LYMPHOCYTES # BLD AUTO: 1.1 K/UL (ref 1–4.8)
LYMPHOCYTES NFR BLD: 9 % (ref 18–48)
MAGNESIUM SERPL-MCNC: 2.2 MG/DL (ref 1.6–2.6)
MCH RBC QN AUTO: 24.8 PG (ref 27–31)
MCHC RBC AUTO-ENTMCNC: 31.8 G/DL (ref 32–36)
MCV RBC AUTO: 78 FL (ref 82–98)
MONOCYTES # BLD AUTO: 0.9 K/UL (ref 0.3–1)
MONOCYTES NFR BLD: 7 % (ref 4–15)
NEUTROPHILS # BLD AUTO: 9.9 K/UL (ref 1.8–7.7)
NEUTROPHILS NFR BLD: 80.3 % (ref 38–73)
NITRITE UR QL STRIP: NEGATIVE
NRBC BLD-RTO: 0 /100 WBC
PCO2 BLDA: 39.4 MMHG (ref 35–45)
PH SMN: 7.46 [PH] (ref 7.35–7.45)
PH UR STRIP: 5 [PH] (ref 5–8)
PLATELET # BLD AUTO: 281 K/UL (ref 150–450)
PMV BLD AUTO: 10.6 FL (ref 9.2–12.9)
PO2 BLDA: 72 MMHG (ref 80–100)
POC BE: 4 MMOL/L
POC SATURATED O2: 95 % (ref 95–100)
POC TCO2: 29 MMOL/L (ref 23–27)
POCT GLUCOSE: 226 MG/DL (ref 70–110)
POCT GLUCOSE: 235 MG/DL (ref 70–110)
POCT GLUCOSE: 238 MG/DL (ref 70–110)
POTASSIUM SERPL-SCNC: 4.6 MMOL/L (ref 3.5–5.1)
PROT SERPL-MCNC: 6.4 G/DL (ref 6–8.4)
PROT UR QL STRIP: NEGATIVE
RBC # BLD AUTO: 3.3 M/UL (ref 4–5.4)
SAMPLE: ABNORMAL
SITE: ABNORMAL
SODIUM SERPL-SCNC: 132 MMOL/L (ref 136–145)
SP GR UR STRIP: 1.02 (ref 1–1.03)
URN SPEC COLLECT METH UR: ABNORMAL
WBC # BLD AUTO: 12.26 K/UL (ref 3.9–12.7)

## 2023-03-08 PROCEDURE — 63600175 PHARM REV CODE 636 W HCPCS: Performed by: STUDENT IN AN ORGANIZED HEALTH CARE EDUCATION/TRAINING PROGRAM

## 2023-03-08 PROCEDURE — 93010 EKG 12-LEAD: ICD-10-PCS | Mod: ,,, | Performed by: INTERNAL MEDICINE

## 2023-03-08 PROCEDURE — 99024 POSTOP FOLLOW-UP VISIT: CPT | Mod: ,,, | Performed by: SURGERY

## 2023-03-08 PROCEDURE — 83605 ASSAY OF LACTIC ACID: CPT | Performed by: HOSPITALIST

## 2023-03-08 PROCEDURE — 80053 COMPREHEN METABOLIC PANEL: CPT | Performed by: STUDENT IN AN ORGANIZED HEALTH CARE EDUCATION/TRAINING PROGRAM

## 2023-03-08 PROCEDURE — 99233 PR SUBSEQUENT HOSPITAL CARE,LEVL III: ICD-10-PCS | Mod: ,,, | Performed by: ANESTHESIOLOGY

## 2023-03-08 PROCEDURE — 36600 WITHDRAWAL OF ARTERIAL BLOOD: CPT

## 2023-03-08 PROCEDURE — 85025 COMPLETE CBC W/AUTO DIFF WBC: CPT | Mod: 91 | Performed by: HOSPITALIST

## 2023-03-08 PROCEDURE — 99024 PR POST-OP FOLLOW-UP VISIT: ICD-10-PCS | Mod: ,,, | Performed by: SURGERY

## 2023-03-08 PROCEDURE — 93010 ELECTROCARDIOGRAM REPORT: CPT | Mod: ,,, | Performed by: INTERNAL MEDICINE

## 2023-03-08 PROCEDURE — 25000003 PHARM REV CODE 250

## 2023-03-08 PROCEDURE — 85025 COMPLETE CBC W/AUTO DIFF WBC: CPT | Performed by: STUDENT IN AN ORGANIZED HEALTH CARE EDUCATION/TRAINING PROGRAM

## 2023-03-08 PROCEDURE — 99900035 HC TECH TIME PER 15 MIN (STAT)

## 2023-03-08 PROCEDURE — 25000242 PHARM REV CODE 250 ALT 637 W/ HCPCS: Performed by: HOSPITALIST

## 2023-03-08 PROCEDURE — 36415 COLL VENOUS BLD VENIPUNCTURE: CPT | Performed by: HOSPITALIST

## 2023-03-08 PROCEDURE — 83735 ASSAY OF MAGNESIUM: CPT | Performed by: STUDENT IN AN ORGANIZED HEALTH CARE EDUCATION/TRAINING PROGRAM

## 2023-03-08 PROCEDURE — 63600175 PHARM REV CODE 636 W HCPCS: Performed by: ANESTHESIOLOGY

## 2023-03-08 PROCEDURE — 25000242 PHARM REV CODE 250 ALT 637 W/ HCPCS: Performed by: STUDENT IN AN ORGANIZED HEALTH CARE EDUCATION/TRAINING PROGRAM

## 2023-03-08 PROCEDURE — 25000003 PHARM REV CODE 250: Performed by: STUDENT IN AN ORGANIZED HEALTH CARE EDUCATION/TRAINING PROGRAM

## 2023-03-08 PROCEDURE — 25000003 PHARM REV CODE 250: Performed by: HOSPITALIST

## 2023-03-08 PROCEDURE — 63600175 PHARM REV CODE 636 W HCPCS

## 2023-03-08 PROCEDURE — 83880 ASSAY OF NATRIURETIC PEPTIDE: CPT | Performed by: HOSPITALIST

## 2023-03-08 PROCEDURE — 63600175 PHARM REV CODE 636 W HCPCS: Performed by: HOSPITALIST

## 2023-03-08 PROCEDURE — 94761 N-INVAS EAR/PLS OXIMETRY MLT: CPT

## 2023-03-08 PROCEDURE — 25000003 PHARM REV CODE 250: Performed by: SURGERY

## 2023-03-08 PROCEDURE — 82803 BLOOD GASES ANY COMBINATION: CPT

## 2023-03-08 PROCEDURE — 84484 ASSAY OF TROPONIN QUANT: CPT | Performed by: HOSPITALIST

## 2023-03-08 PROCEDURE — 99233 SBSQ HOSP IP/OBS HIGH 50: CPT | Mod: ,,, | Performed by: ANESTHESIOLOGY

## 2023-03-08 PROCEDURE — 94640 AIRWAY INHALATION TREATMENT: CPT

## 2023-03-08 PROCEDURE — 87040 BLOOD CULTURE FOR BACTERIA: CPT | Mod: 59 | Performed by: HOSPITALIST

## 2023-03-08 PROCEDURE — 25000003 PHARM REV CODE 250: Performed by: INTERNAL MEDICINE

## 2023-03-08 PROCEDURE — 85730 THROMBOPLASTIN TIME PARTIAL: CPT | Performed by: STUDENT IN AN ORGANIZED HEALTH CARE EDUCATION/TRAINING PROGRAM

## 2023-03-08 PROCEDURE — 93005 ELECTROCARDIOGRAM TRACING: CPT

## 2023-03-08 PROCEDURE — 81003 URINALYSIS AUTO W/O SCOPE: CPT | Performed by: HOSPITALIST

## 2023-03-08 PROCEDURE — 80048 BASIC METABOLIC PNL TOTAL CA: CPT | Mod: XB | Performed by: HOSPITALIST

## 2023-03-08 PROCEDURE — 20600001 HC STEP DOWN PRIVATE ROOM

## 2023-03-08 PROCEDURE — 25000003 PHARM REV CODE 250: Performed by: ANESTHESIOLOGY

## 2023-03-08 PROCEDURE — 27000221 HC OXYGEN, UP TO 24 HOURS

## 2023-03-08 RX ORDER — NALOXONE HCL 0.4 MG/ML
0.4 VIAL (ML) INJECTION ONCE
Status: DISCONTINUED | OUTPATIENT
Start: 2023-03-08 | End: 2023-03-09

## 2023-03-08 RX ORDER — HYDROMORPHONE HYDROCHLORIDE 1 MG/ML
0.5 INJECTION, SOLUTION INTRAMUSCULAR; INTRAVENOUS; SUBCUTANEOUS ONCE
Status: DISCONTINUED | OUTPATIENT
Start: 2023-03-08 | End: 2023-03-08

## 2023-03-08 RX ORDER — FUROSEMIDE 10 MG/ML
20 INJECTION INTRAMUSCULAR; INTRAVENOUS ONCE
Status: COMPLETED | OUTPATIENT
Start: 2023-03-08 | End: 2023-03-08

## 2023-03-08 RX ORDER — FUROSEMIDE 10 MG/ML
80 INJECTION INTRAMUSCULAR; INTRAVENOUS ONCE
Status: COMPLETED | OUTPATIENT
Start: 2023-03-08 | End: 2023-03-08

## 2023-03-08 RX ORDER — IPRATROPIUM BROMIDE AND ALBUTEROL SULFATE 2.5; .5 MG/3ML; MG/3ML
3 SOLUTION RESPIRATORY (INHALATION) EVERY 4 HOURS
Status: DISCONTINUED | OUTPATIENT
Start: 2023-03-08 | End: 2023-03-09

## 2023-03-08 RX ORDER — MUPIROCIN 20 MG/G
OINTMENT TOPICAL 2 TIMES DAILY
Status: COMPLETED | OUTPATIENT
Start: 2023-03-08 | End: 2023-03-12

## 2023-03-08 RX ORDER — CEPHALEXIN 500 MG/1
500 CAPSULE ORAL 3 TIMES DAILY
Status: DISCONTINUED | OUTPATIENT
Start: 2023-03-10 | End: 2023-03-08

## 2023-03-08 RX ADMIN — HYDROMORPHONE HYDROCHLORIDE 0.5 MG: 1 INJECTION, SOLUTION INTRAMUSCULAR; INTRAVENOUS; SUBCUTANEOUS at 05:03

## 2023-03-08 RX ADMIN — IPRATROPIUM BROMIDE AND ALBUTEROL SULFATE 3 ML: 2.5; .5 SOLUTION RESPIRATORY (INHALATION) at 03:03

## 2023-03-08 RX ADMIN — ACETAMINOPHEN 1000 MG: 500 TABLET ORAL at 05:03

## 2023-03-08 RX ADMIN — OXYCODONE HYDROCHLORIDE 5 MG: 5 TABLET ORAL at 05:03

## 2023-03-08 RX ADMIN — INSULIN ASPART 4 UNITS: 100 INJECTION, SOLUTION INTRAVENOUS; SUBCUTANEOUS at 09:03

## 2023-03-08 RX ADMIN — CARVEDILOL 12.5 MG: 12.5 TABLET, FILM COATED ORAL at 08:03

## 2023-03-08 RX ADMIN — IPRATROPIUM BROMIDE AND ALBUTEROL SULFATE 3 ML: 2.5; .5 SOLUTION RESPIRATORY (INHALATION) at 06:03

## 2023-03-08 RX ADMIN — OXYCODONE HYDROCHLORIDE 5 MG: 5 TABLET ORAL at 03:03

## 2023-03-08 RX ADMIN — APIXABAN 5 MG: 5 TABLET, FILM COATED ORAL at 09:03

## 2023-03-08 RX ADMIN — VANCOMYCIN HYDROCHLORIDE 1500 MG: 1.5 INJECTION, POWDER, LYOPHILIZED, FOR SOLUTION INTRAVENOUS at 09:03

## 2023-03-08 RX ADMIN — OXYCODONE HYDROCHLORIDE 5 MG: 5 TABLET ORAL at 10:03

## 2023-03-08 RX ADMIN — RACEPINEPHRINE HYDROCHLORIDE 0.5 ML: 11.25 SOLUTION RESPIRATORY (INHALATION) at 07:03

## 2023-03-08 RX ADMIN — ATORVASTATIN CALCIUM 40 MG: 40 TABLET, FILM COATED ORAL at 09:03

## 2023-03-08 RX ADMIN — ASPIRIN 81 MG: 81 TABLET, COATED ORAL at 08:03

## 2023-03-08 RX ADMIN — FUROSEMIDE 20 MG: 10 INJECTION, SOLUTION INTRAMUSCULAR; INTRAVENOUS at 10:03

## 2023-03-08 RX ADMIN — NALXONE HYDROCHLORIDE 0.02 MG: 0.4 INJECTION INTRAMUSCULAR; INTRAVENOUS; SUBCUTANEOUS at 06:03

## 2023-03-08 RX ADMIN — CARVEDILOL 12.5 MG: 12.5 TABLET, FILM COATED ORAL at 09:03

## 2023-03-08 RX ADMIN — FUROSEMIDE 80 MG: 10 INJECTION, SOLUTION INTRAVENOUS at 11:03

## 2023-03-08 RX ADMIN — MUPIROCIN: 20 OINTMENT TOPICAL at 09:03

## 2023-03-08 RX ADMIN — AMLODIPINE BESYLATE 10 MG: 10 TABLET ORAL at 08:03

## 2023-03-08 RX ADMIN — MUPIROCIN: 20 OINTMENT TOPICAL at 08:03

## 2023-03-08 RX ADMIN — IPRATROPIUM BROMIDE AND ALBUTEROL SULFATE 3 ML: 2.5; .5 SOLUTION RESPIRATORY (INHALATION) at 11:03

## 2023-03-08 RX ADMIN — OXYCODONE HYDROCHLORIDE 5 MG: 5 TABLET ORAL at 12:03

## 2023-03-08 RX ADMIN — ACETAMINOPHEN 1000 MG: 500 TABLET ORAL at 08:03

## 2023-03-08 RX ADMIN — INSULIN ASPART 6 UNITS: 100 INJECTION, SOLUTION INTRAVENOUS; SUBCUTANEOUS at 07:03

## 2023-03-08 RX ADMIN — PIPERACILLIN AND TAZOBACTAM 4.5 G: 4; .5 INJECTION, POWDER, LYOPHILIZED, FOR SOLUTION INTRAVENOUS; PARENTERAL at 08:03

## 2023-03-08 RX ADMIN — IPRATROPIUM BROMIDE AND ALBUTEROL SULFATE 3 ML: 2.5; .5 SOLUTION RESPIRATORY (INHALATION) at 07:03

## 2023-03-08 RX ADMIN — PIPERACILLIN AND TAZOBACTAM 4.5 G: 4; .5 INJECTION, POWDER, LYOPHILIZED, FOR SOLUTION INTRAVENOUS; PARENTERAL at 12:03

## 2023-03-08 RX ADMIN — PIPERACILLIN AND TAZOBACTAM 4.5 G: 4; .5 INJECTION, POWDER, LYOPHILIZED, FOR SOLUTION INTRAVENOUS; PARENTERAL at 03:03

## 2023-03-08 RX ADMIN — CLOPIDOGREL BISULFATE 75 MG: 75 TABLET ORAL at 08:03

## 2023-03-08 RX ADMIN — APIXABAN 5 MG: 5 TABLET, FILM COATED ORAL at 08:03

## 2023-03-08 RX ADMIN — HYDROMORPHONE HYDROCHLORIDE 0.5 MG: 1 INJECTION, SOLUTION INTRAMUSCULAR; INTRAVENOUS; SUBCUTANEOUS at 12:03

## 2023-03-08 NOTE — PROGRESS NOTES
[facility]  Vascular Surgery  Progress Note    Patient Name: Nelsy Marino  MRN: [id number]  Admission Date: 2/27/2023  Primary Care Provider: Juan F Garay    Subjective:     Interval History: NAEON. VSWNL. D/C to SNF today pending SW.    Post-Op Info:  Procedure(s) (LRB):  AMPUTATION, FOOT, TRANSMETATARSAL (Right)   4 Days Post-Op       Medications:  Continuous Infusions:  Scheduled Meds:   acetaminophen  1,000 mg Oral Q8H    amLODIPine  10 mg Oral Daily    apixaban  5 mg Oral BID    aspirin  81 mg Oral Daily    atorvastatin  40 mg Oral QHS    carvediloL  12.5 mg Oral BID    clopidogreL  75 mg Oral Daily    HYDROmorphone  0.5 mg Intravenous Once    insulin detemir U-100  10 Units Subcutaneous BID    mupirocin   Nasal BID    piperacillin-tazobactam (ZOSYN) IVPB  4.5 g Intravenous Q8H    polyethylene glycol  17 g Oral Daily     PRN Meds:albuterol-ipratropium, bisacodyL, dextrose 10%, dextrose 10%, dextrose, dextrose, glucagon (human recombinant), glucagon (human recombinant), HYDROmorphone, insulin aspart U-100, magnesium oxide, magnesium oxide, melatonin, naloxone, ondansetron, oxyCODONE, potassium bicarbonate, potassium bicarbonate, potassium bicarbonate, potassium, sodium phosphates, potassium, sodium phosphates, potassium, sodium phosphates, prochlorperazine, simethicone, sodium chloride 0.9%, sodium chloride 0.9%, sodium chloride 0.9%     Objective:     Vital Signs (Most Recent):  Temp: 98.7 °F (37.1 °C) (03/08/23 0745)  Pulse: 91 (03/08/23 0830)  Resp: (!) 26 (03/08/23 0830)  BP: 118/79 (03/08/23 0800)  SpO2: 95 % (03/08/23 0830)   Vital Signs (24h Range):  Temp:  [98.7 °F (37.1 °C)-99.3 °F (37.4 °C)] 98.7 °F (37.1 °C)  Pulse:  [72-99] 91  Resp:  [15-46] 26  SpO2:  [88 %-100 %] 95 %  BP: ()/() 118/79       Physical Exam  Vitals reviewed.   Constitutional:       Appearance: Normal appearance.   Cardiovascular:      Rate and Rhythm: Normal rate and regular  rhythm.      Comments: R biphasic DP and PT   L monophasic DP and biphasic PT  Abdominal:      General: Abdomen is flat.      Palpations: Abdomen is soft.   Skin:     Comments: R thigh incision with dressing - not soaked, no hematoma.   L groin dressing- small portion of breakdown to center of incision   R foot s/p TMA in dressing   Neurological:      Mental Status: She is alert    Significant Labs:  CBC:   Recent Labs   Lab 03/08/23  0247   WBC 12.26   RBC 3.30*   HGB 8.2*   HCT 25.8*      MCV 78*   MCH 24.8*   MCHC 31.8*     CMP:   Recent Labs   Lab 03/08/23  0247   *   CALCIUM 8.2*   ALBUMIN 1.8*   PROT 6.4   *   K 4.6   CO2 25   CL 97   BUN 32*   CREATININE 1.2   ALKPHOS 41*   ALT 13   AST 23   BILITOT 0.6       Significant Diagnostics:  I have reviewed all pertinent imaging results/findings within the past 24 hours.    Assessment/Plan:     * Critical limb ischemia with history of revascularization of same extremity  Nelsy Marino is a 82 y.o. lady with severe PAD    - s/p TMA 3/4  - Q4H neurovasc checks  - Continue diet  - antibiotics per medicine  - ASA/plavix/Statin/Eliquis   - PT/OT  - Podiatry following  - stepdown to medicine     Dispo: SNF  Follow up in two weeks with Bettina ARCHIE/TBI, US Graft        Jomar Maciel MD  Vascular Surgery  [facility]

## 2023-03-08 NOTE — PLAN OF CARE
12:58 PM   The SW uploaded the patient's 142 and PASSR into [employer].     1:07 PM  [facility]- Declined       The SW will continue to follow.    Phillip Evans LMSW  Case Management [facility]  Ext: [phone]

## 2023-03-08 NOTE — SUBJECTIVE & OBJECTIVE
Interval History/Significant Events: NAOE. SW referrals sent. To go over insurance today. On day 4/5 of zosyn.     Follow-up For: Procedure(s) (LRB):  AMPUTATION, FOOT, TRANSMETATARSAL (Right)    Post-Operative Day: 4 Days Post-Op    Objective:     Vital Signs (Most Recent):  Temp: 99.3 °F (37.4 °C) (03/08/23 0300)  Pulse: 97 (03/08/23 0530)  Resp: (!) 34 (03/08/23 0530)  BP: 119/71 (03/08/23 0500)  SpO2: 95 % (03/08/23 0530)   Vital Signs (24h Range):  Temp:  [98.9 °F (37.2 °C)-99.3 °F (37.4 °C)] 99.3 °F (37.4 °C)  Pulse:  [72-97] 97  Resp:  [15-46] 34  SpO2:  [89 %-100 %] 95 %  BP: ()/() 119/71     Weight: 74 kg (163 lb 2.3 oz)  Body mass index is 28 kg/m².      Intake/Output Summary (Last 24 hours) at 3/8/2023 0614  Last data filed at 3/7/2023 1819  Gross per 24 hour   Intake --   Output 302 ml   Net -302 ml       Physical Exam  Vitals and nursing note reviewed.   Constitutional:       General: She is not in acute distress.     Appearance: Normal appearance. She is obese.   HENT:      Head: Normocephalic and atraumatic.      Nose: No congestion.      Mouth/Throat:      Mouth: Mucous membranes are moist.   Cardiovascular:      Rate and Rhythm: Normal rate and regular rhythm.      Pulses: Normal pulses.      Comments: Monophasic L PT. Monophasic R DP  Pulmonary:      Effort: Pulmonary effort is normal. No respiratory distress.   Abdominal:      General: Abdomen is flat.      Palpations: Abdomen is soft.      Tenderness: There is no abdominal tenderness.   Musculoskeletal:         General: Swelling, deformity and signs of injury present.      Comments: R TMA w dressing intact, no breakthrough bleeding appreciated   Skin:     General: Skin is warm and dry.   Neurological:      General: No focal deficit present.      Mental Status: She is alert and oriented to person, place, and time. Mental status is at baseline.   Psychiatric:         Mood and Affect: Mood normal.         Behavior: Behavior normal.        Vents:  Oxygen Concentration (%): 24 (03/06/23 0343)    Lines/Drains/Airways       Drain  Duration             Female External Urinary Catheter 03/05/23 0132 3 days              Peripheral Intravenous Line  Duration                  External Jugular IV 03/02/23 1203 5 days         Peripheral IV - Single Lumen 03/03/23 1815 20 G Anterior;Distal;Left Upper Arm 4 days                    Significant Labs:    CBC/Anemia Profile:  Recent Labs   Lab 03/06/23 2238 03/07/23  0413 03/08/23  0247   WBC 11.00 10.50 12.26   HGB 7.5* 7.4* 8.2*   HCT 23.7* 24.0* 25.8*    231 281   MCV 81* 82 78*   RDW 14.6* 14.6* 14.4        Chemistries:  Recent Labs   Lab 03/07/23 0413 03/08/23  0247   * 132*   K 4.3 4.6   CL 98 97   CO2 25 25   BUN 33* 32*   CREATININE 1.2 1.2   CALCIUM 8.2* 8.2*   ALBUMIN 1.8* 1.8*   PROT 6.0 6.4   BILITOT 0.5 0.6   ALKPHOS 54* 41*   ALT 13 13   AST 23 23       All pertinent labs within the past 24 hours have been reviewed.    Significant Imaging:  I have reviewed all pertinent imaging results/findings within the past 24 hours.

## 2023-03-08 NOTE — PLAN OF CARE
Hospitalist assumption of care - stepdown:  82-year-old female w/ PAD, h/x of DVT admitted w/ R foot pain. 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 She underwent fem fem bypass on 3/2. She was found to have moderately diseased SFA but patent, good back-bleeding prior to angiogram intervention. Right SFA baloon stent placed on . She was admitted to SICU for q1hr neurovascular monitoring after large vessel revascularization. Pt underwent transmetatarsal amputation of foot on 3/4/23 by podiatry.      Critical limb ischemia with history of revascularization of same extremity  - Vascular surgery recent SFA stent placement  - s/p amputation transmetatarsal   - Continue Zosyn GABE on 3/9/23 (5 day total) per ID  - Eloquis   - Neurovascular checks per vascular recommendations  - aspirin, cilazostol     Orthopedic Wet gangrene  Day 4 of 5 of Zosyn.  If she is discharged prior to the 5 days, then can switch to oral augmentin to complete her course.  Follow up in ID clinic in 2-3 weeks     Vascular graft infection  -Has been on cefalexin 500 mg po tid for life long suppression (prior history of right groin wound infection following vascular bypass surgery in 2019).    -Resume cefalexin after course of zosyn      History of DVT   Eloquis 5 BID     Type 2 diabetes mellitus  Continue inpatient insulin regiment     Essential hypertension  -Continue home medications:  Carvedilol 12.5 BID, Amlodipine 10 mg daily, hydrochlorothiazide 25 mg daily, losartan 100 mg daily     Pending SNF placement

## 2023-03-08 NOTE — PROGRESS NOTES
[facility]  Podiatry  Progress Note    Patient Name: Nelsy Marino  MRN: [id number]  Admission Date: 2/27/2023  Hospital Length of Stay: 9 days  Attending Physician: Percy Pritchard MD  Primary Care Provider: [facility]     Subjective:     Interval History: Hypotensive, other vitals stable. No new pedal complaints. Dressings clean dry, intact.       Follow-up For: Procedure(s) (LRB):  AMPUTATION, FOOT, TRANSMETATARSAL (Right)    Post-Operative Day: 4 Days Post-Op    Scheduled Meds:   acetaminophen  1,000 mg Oral Q8H    amLODIPine  10 mg Oral Daily    apixaban  5 mg Oral BID    aspirin  81 mg Oral Daily    atorvastatin  40 mg Oral QHS    carvediloL  12.5 mg Oral BID    [START ON 3/10/2023] cephALEXin  500 mg Oral TID    clopidogreL  75 mg Oral Daily    HYDROmorphone  0.5 mg Intravenous Once    insulin detemir U-100  12 Units Subcutaneous BID    mupirocin   Nasal BID    piperacillin-tazobactam (ZOSYN) IVPB  4.5 g Intravenous Q8H    polyethylene glycol  17 g Oral Daily     Continuous Infusions:  PRN Meds:albuterol-ipratropium, bisacodyL, dextrose 10%, dextrose 10%, dextrose, dextrose, glucagon (human recombinant), glucagon (human recombinant), HYDROmorphone, insulin aspart U-100, magnesium oxide, magnesium oxide, melatonin, naloxone, ondansetron, oxyCODONE, potassium bicarbonate, potassium bicarbonate, potassium bicarbonate, potassium, sodium phosphates, potassium, sodium phosphates, potassium, sodium phosphates, prochlorperazine, simethicone, sodium chloride 0.9%, sodium chloride 0.9%, sodium chloride 0.9%    Review of Systems   Constitutional:  Negative for chills and fever.   HENT:  Negative for hearing loss and trouble swallowing.    Eyes:  Negative for discharge and visual disturbance.   Respiratory:  Negative for chest tightness and shortness of breath.    Cardiovascular:  Negative for chest pain and palpitations.   Gastrointestinal:  Negative for abdominal distention, abdominal pain,  nausea and vomiting.   Genitourinary:  Negative for difficulty urinating and hematuria.   Musculoskeletal:  Negative for arthralgias and back pain.   Skin:  Positive for color change and wound. Negative for rash.   Neurological:  Negative for dizziness and headaches.   Objective:     Vital Signs (Most Recent):  Temp: 98.6 °F (37 °C) (03/08/23 1133)  Pulse: 89 (03/08/23 1133)  Resp: 12 (03/08/23 1222)  BP: (!) 133/58 (03/08/23 1133)  SpO2: 96 % (03/08/23 1133)   Vital Signs (24h Range):  Temp:  [98.6 °F (37 °C)-99.3 °F (37.4 °C)] 98.6 °F (37 °C)  Pulse:  [72-99] 89  Resp:  [12-41] 12  SpO2:  [88 %-99 %] 96 %  BP: (106-163)/() 133/58     Weight: 74 kg (163 lb 2.3 oz)  Body mass index is 28 kg/m².    Foot Exam    General  Orientation: alert and oriented to person, place, and time   Affect: appropriate       Right Foot/Ankle     Inspection and Palpation  Tenderness: (TMA site, mild)  Swelling: (TMA site, mild)  Skin Exam: drainage, maceration and ulcer (incision);     Neurovascular  Dorsalis pedis: doppler  Posterior tibial: doppler  Saphenous nerve sensation: diminished  Tibial nerve sensation: diminished  Superficial peroneal nerve sensation: diminished  Deep peroneal nerve sensation: diminished  Sural nerve sensation: diminished    Comments  S/p right tma. Sutures are intact without dehiscence. Mild serosanguinous drainage from central area of incision, with periwound maceration. Mild tenderness throughout surgical site.     Left Foot/Ankle      Inspection and Palpation  Tenderness: none   Swelling: none   Skin Exam: skin intact;     Neurovascular  Dorsalis pedis: absent  Posterior tibial: doppler  Saphenous nerve sensation: diminished  Tibial nerve sensation: diminished  Superficial peroneal nerve sensation: diminished  Deep peroneal nerve sensation: diminished  Sural nerve sensation: diminished    Comments  No open wounds noted                    Laboratory:  Blood Cultures: No results for input(s): LABCLARISAOO  in the last 48 hours.  CBC:   Recent Labs   Lab 03/08/23  0247   WBC 12.26   RBC 3.30*   HGB 8.2*   HCT 25.8*      MCV 78*   MCH 24.8*   MCHC 31.8*     CMP:   Recent Labs   Lab 03/08/23  0247   *   CALCIUM 8.2*   ALBUMIN 1.8*   PROT 6.4   *   K 4.6   CO2 25   CL 97   BUN 32*   CREATININE 1.2   ALKPHOS 41*   ALT 13   AST 23   BILITOT 0.6     CRP: No results for input(s): CRP in the last 168 hours.  ESR: No results for input(s): SEDRATE in the last 168 hours.  Microbiology Results (last 7 days)       Procedure Component Value Units Date/Time    Culture, Anaerobe [[id number]] Collected: 03/04/23 1141    Order Status: Completed Specimen: Incision site from Foot, Right Updated: 03/08/23 1026     Anaerobic Culture Culture in progress    Narrative:      3rd Metatarsal    Aerobic culture [[id number]] Collected: 03/04/23 1141    Order Status: Completed Specimen: Incision site from Foot, Right Updated: 03/07/23 0844     Aerobic Bacterial Culture No growth    Narrative:      3rd Metatarsal    Gram stain [[id number]] Collected: 03/04/23 1141    Order Status: Completed Specimen: Incision site from Foot, Right Updated: 03/04/23 1418     Gram Stain Result No WBC's      No organisms seen    Narrative:      3rd Metatarsal    Fungus culture [[id number]] Collected: 03/04/23 1141    Order Status: Sent Specimen: Incision site from Foot, Right Updated: 03/04/23 1204    AFB Culture & Smear [[id number]] Collected: 03/04/23 1150    Order Status: Sent Specimen: Incision site from Foot, Right Updated: 03/04/23 1150    Blood culture #2 **CANNOT BE ORDERED STAT** [[id number]] Collected: 02/27/23 1923    Order Status: Completed Specimen: Blood from Peripheral, Antecubital, Right Updated: 03/03/23 2103     Blood Culture, Routine No Growth after 4 days.    Blood culture #1 **CANNOT BE ORDERED STAT** [[id number]] Collected: 02/27/23 1428    Order Status: Completed Specimen: Blood from Peripheral, Antecubital, Left Updated: 03/03/23  1503     Blood Culture, Routine No Growth after 4 days.          Specimen (24h ago, onward)      None          Assessment/Plan:     Orthopedic  S/P transmetatarsal amputation of foot, right  Plan:  -OK for discharge from podiatry perspective.  -Antibiotic plan per ID.  -Appreciate vascular surgery recs.  -Dressing changes by nursing.  -RLE WB to heel in surgical shoe for transfers or short distances.   -Elevate RLE at all times while in bed or chair.  -Podiatry will follow.   DC Instructions: Please order ambulatory referral to podiatry for outpatient followup. RLE WB to heel in surgical shoe for transfers or short distances. HH/facility dressing changes R foot 3x/week: cleanse incision with saline, apply betadine soaked adaptic, apply gauze, wrap with cast padding and coban.         Lalo Pollock DPM PGY-3  Podiatric Medicine & Surgery  [facility]  Secure Chat Preferred  Mobile: [phone]  Pager: [phone]

## 2023-03-08 NOTE — NURSING
1116 Arrived to floor in WC accompanied by two staff and three family members. AA/O x4 presently. RR E/U 2L NC. custodial CTA. Left IJ is patent SL. Right PIV infusing zosyn without difficulty. VSS. Noted to have incision to left groin area, edges are approximated and sutures intact. Noted to have incision to right groin area, edges are approximated and staples intact. No s/s of infection noted. Right foot noted to be covered in Ace wrap and sitting in surgical boot. Elevated off mattress. This nurse and Natasha Rn did dual skin assessment. No other skin issues noted. Updated on POC this shift. BSC provided. Oriented to room and surroundings. Floor free of debris and is clean. Educated on call light and advised to call for assistance. Return demonstration of using call light. Verbalized understanding. Bed set to lowest and locked position. SR x 2. Call light in reach.   1828 Notified Dr. Pritchard and RT for PRN breathing treatment. Expiratory wheezing. Dr. Pritchard to come see patient. SpO2 upper 80s to low 90s. She is agitated and decline in mental status  and inadvertently pulled out left IJ. Patient was on 2L and RT put patient on HFNC 12L. SpO2 93%. Will arouse momentarily with sternal rub. AGBs done. Chest xray, blood culturess, lactic acid, repeat CBC/CMP, troponin/BNP. EKG, d/c PRN opiates for now-will restart when needed, continuous pulse ox, new antibiotic  1934 Rapid called. Dr. Pritchard, rapid team at bedside. Per order 0.4mg Narcan provided. Mental status improved. 4L NC HFNC. 101.4 temperature, oral.

## 2023-03-08 NOTE — PLAN OF CARE
"      SICU PLAN OF CARE NOTE    Dx: Critical limb ischemia with history of revascularization of same extremity    Shift Events: NAEON. Patient with persistent pain throughout the night, oxycodone given, dilaudid given once for break through pain. OOBTC this AM with 3x assist.     Goals of Care: SBP < 180; PT/OT; increase strength    Neuro: AAO x4, Follows Commands, and Moves All Extremities    Vital Signs: /71 (BP Location: Right arm, Patient Position: Lying)   Pulse 97   Temp 99.3 °F (37.4 °C) (Oral)   Resp (!) 34   Ht 5' 4" (1.626 m)   Wt 74 kg (163 lb 2.3 oz)   LMP  (LMP Unknown)   SpO2 95%   BMI 28.00 kg/m²     Respiratory: Nasal Cannula     Diet: Diabetic diet    Gtts: None    Urine Output: Voids Spontaneously multiple unmeasured voids overnight. X3 small Bms overnight.     Labs/Accuchecks: Daily labs checked. Accuchecks AC/HS.    Skin: No new breakdown noted. Full bath given. Patient repositions self throughout shift. Waffle inflated.       "

## 2023-03-08 NOTE — SUBJECTIVE & OBJECTIVE
Medications:  Continuous Infusions:  Scheduled Meds:   acetaminophen  1,000 mg Oral Q8H    amLODIPine  10 mg Oral Daily    apixaban  5 mg Oral BID    aspirin  81 mg Oral Daily    atorvastatin  40 mg Oral QHS    carvediloL  12.5 mg Oral BID    clopidogreL  75 mg Oral Daily    HYDROmorphone  0.5 mg Intravenous Once    insulin detemir U-100  10 Units Subcutaneous BID    mupirocin   Nasal BID    piperacillin-tazobactam (ZOSYN) IVPB  4.5 g Intravenous Q8H    polyethylene glycol  17 g Oral Daily     PRN Meds:albuterol-ipratropium, bisacodyL, dextrose 10%, dextrose 10%, dextrose, dextrose, glucagon (human recombinant), glucagon (human recombinant), HYDROmorphone, insulin aspart U-100, magnesium oxide, magnesium oxide, melatonin, naloxone, ondansetron, oxyCODONE, potassium bicarbonate, potassium bicarbonate, potassium bicarbonate, potassium, sodium phosphates, potassium, sodium phosphates, potassium, sodium phosphates, prochlorperazine, simethicone, sodium chloride 0.9%, sodium chloride 0.9%, sodium chloride 0.9%     Objective:     Vital Signs (Most Recent):  Temp: 98.7 °F (37.1 °C) (03/08/23 0745)  Pulse: 91 (03/08/23 0830)  Resp: (!) 26 (03/08/23 0830)  BP: 118/79 (03/08/23 0800)  SpO2: 95 % (03/08/23 0830)   Vital Signs (24h Range):  Temp:  [98.7 °F (37.1 °C)-99.3 °F (37.4 °C)] 98.7 °F (37.1 °C)  Pulse:  [72-99] 91  Resp:  [15-46] 26  SpO2:  [88 %-100 %] 95 %  BP: ()/() 118/79       Physical Exam  Vitals reviewed.   Constitutional:       Appearance: Normal appearance.   Cardiovascular:      Rate and Rhythm: Normal rate and regular rhythm.      Comments: R biphasic DP and PT   L monophasic DP and biphasic PT  Abdominal:      General: Abdomen is flat.      Palpations: Abdomen is soft.   Skin:     Comments: R thigh incision with dressing - not soaked, no hematoma.   L groin dressing- small portion of breakdown to center of incision   R foot s/p TMA in dressing   Neurological:      Mental Status: She is  alert    Significant Labs:  CBC:   Recent Labs   Lab 03/08/23  0247   WBC 12.26   RBC 3.30*   HGB 8.2*   HCT 25.8*      MCV 78*   MCH 24.8*   MCHC 31.8*     CMP:   Recent Labs   Lab 03/08/23  0247   *   CALCIUM 8.2*   ALBUMIN 1.8*   PROT 6.4   *   K 4.6   CO2 25   CL 97   BUN 32*   CREATININE 1.2   ALKPHOS 41*   ALT 13   AST 23   BILITOT 0.6       Significant Diagnostics:  I have reviewed all pertinent imaging results/findings within the past 24 hours.

## 2023-03-08 NOTE — SUBJECTIVE & OBJECTIVE
Subjective:     Interval History: Hypotensive, other vitals stable. No new pedal complaints. Dressings clean dry, intact.       Follow-up For: Procedure(s) (LRB):  AMPUTATION, FOOT, TRANSMETATARSAL (Right)    Post-Operative Day: 4 Days Post-Op    Scheduled Meds:   acetaminophen  1,000 mg Oral Q8H    amLODIPine  10 mg Oral Daily    apixaban  5 mg Oral BID    aspirin  81 mg Oral Daily    atorvastatin  40 mg Oral QHS    carvediloL  12.5 mg Oral BID    [START ON 3/10/2023] cephALEXin  500 mg Oral TID    clopidogreL  75 mg Oral Daily    HYDROmorphone  0.5 mg Intravenous Once    insulin detemir U-100  12 Units Subcutaneous BID    mupirocin   Nasal BID    piperacillin-tazobactam (ZOSYN) IVPB  4.5 g Intravenous Q8H    polyethylene glycol  17 g Oral Daily     Continuous Infusions:  PRN Meds:albuterol-ipratropium, bisacodyL, dextrose 10%, dextrose 10%, dextrose, dextrose, glucagon (human recombinant), glucagon (human recombinant), HYDROmorphone, insulin aspart U-100, magnesium oxide, magnesium oxide, melatonin, naloxone, ondansetron, oxyCODONE, potassium bicarbonate, potassium bicarbonate, potassium bicarbonate, potassium, sodium phosphates, potassium, sodium phosphates, potassium, sodium phosphates, prochlorperazine, simethicone, sodium chloride 0.9%, sodium chloride 0.9%, sodium chloride 0.9%    Review of Systems   Constitutional:  Negative for chills and fever.   HENT:  Negative for hearing loss and trouble swallowing.    Eyes:  Negative for discharge and visual disturbance.   Respiratory:  Negative for chest tightness and shortness of breath.    Cardiovascular:  Negative for chest pain and palpitations.   Gastrointestinal:  Negative for abdominal distention, abdominal pain, nausea and vomiting.   Genitourinary:  Negative for difficulty urinating and hematuria.   Musculoskeletal:  Negative for arthralgias and back pain.   Skin:  Positive for color change and wound. Negative for rash.   Neurological:  Negative for  dizziness and headaches.   Objective:     Vital Signs (Most Recent):  Temp: 98.6 °F (37 °C) (03/08/23 1133)  Pulse: 89 (03/08/23 1133)  Resp: 12 (03/08/23 1222)  BP: (!) 133/58 (03/08/23 1133)  SpO2: 96 % (03/08/23 1133)   Vital Signs (24h Range):  Temp:  [98.6 °F (37 °C)-99.3 °F (37.4 °C)] 98.6 °F (37 °C)  Pulse:  [72-99] 89  Resp:  [12-41] 12  SpO2:  [88 %-99 %] 96 %  BP: (106-163)/() 133/58     Weight: 74 kg (163 lb 2.3 oz)  Body mass index is 28 kg/m².    Foot Exam    General  Orientation: alert and oriented to person, place, and time   Affect: appropriate       Right Foot/Ankle     Inspection and Palpation  Tenderness: (TMA site, mild)  Swelling: (TMA site, mild)  Skin Exam: drainage, maceration and ulcer (incision);     Neurovascular  Dorsalis pedis: doppler  Posterior tibial: doppler  Saphenous nerve sensation: diminished  Tibial nerve sensation: diminished  Superficial peroneal nerve sensation: diminished  Deep peroneal nerve sensation: diminished  Sural nerve sensation: diminished    Comments  S/p right tma. Sutures are intact without dehiscence. Mild serosanguinous drainage from central area of incision, with periwound maceration. Mild tenderness throughout surgical site.     Left Foot/Ankle      Inspection and Palpation  Tenderness: none   Swelling: none   Skin Exam: skin intact;     Neurovascular  Dorsalis pedis: absent  Posterior tibial: doppler  Saphenous nerve sensation: diminished  Tibial nerve sensation: diminished  Superficial peroneal nerve sensation: diminished  Deep peroneal nerve sensation: diminished  Sural nerve sensation: diminished    Comments  No open wounds noted                    Laboratory:  Blood Cultures: No results for input(s): LABBLOO in the last 48 hours.  CBC:   Recent Labs   Lab 03/08/23  0247   WBC 12.26   RBC 3.30*   HGB 8.2*   HCT 25.8*      MCV 78*   MCH 24.8*   MCHC 31.8*     CMP:   Recent Labs   Lab 03/08/23  0247   *   CALCIUM 8.2*   ALBUMIN 1.8*    PROT 6.4   *   K 4.6   CO2 25   CL 97   BUN 32*   CREATININE 1.2   ALKPHOS 41*   ALT 13   AST 23   BILITOT 0.6     CRP: No results for input(s): CRP in the last 168 hours.  ESR: No results for input(s): SEDRATE in the last 168 hours.  Microbiology Results (last 7 days)       Procedure Component Value Units Date/Time    Culture, Anaerobe [[id number]] Collected: 03/04/23 1141    Order Status: Completed Specimen: Incision site from Foot, Right Updated: 03/08/23 1026     Anaerobic Culture Culture in progress    Narrative:      3rd Metatarsal    Aerobic culture [[id number]] Collected: 03/04/23 1141    Order Status: Completed Specimen: Incision site from Foot, Right Updated: 03/07/23 0844     Aerobic Bacterial Culture No growth    Narrative:      3rd Metatarsal    Gram stain [[id number]] Collected: 03/04/23 1141    Order Status: Completed Specimen: Incision site from Foot, Right Updated: 03/04/23 1418     Gram Stain Result No WBC's      No organisms seen    Narrative:      3rd Metatarsal    Fungus culture [[id number]] Collected: 03/04/23 1141    Order Status: Sent Specimen: Incision site from Foot, Right Updated: 03/04/23 1204    AFB Culture & Smear [[id number]] Collected: 03/04/23 1150    Order Status: Sent Specimen: Incision site from Foot, Right Updated: 03/04/23 1150    Blood culture #2 **CANNOT BE ORDERED STAT** [[id number]] Collected: 02/27/23 1923    Order Status: Completed Specimen: Blood from Peripheral, Antecubital, Right Updated: 03/03/23 2103     Blood Culture, Routine No Growth after 4 days.    Blood culture #1 **CANNOT BE ORDERED STAT** [[id number]] Collected: 02/27/23 1428    Order Status: Completed Specimen: Blood from Peripheral, Antecubital, Left Updated: 03/03/23 1503     Blood Culture, Routine No Growth after 4 days.          Specimen (24h ago, onward)      None

## 2023-03-08 NOTE — ASSESSMENT & PLAN NOTE
Nelsy Marino is a 82 y.o. lady with severe PAD    - s/p TMA 3/4  - Q4H neurovasc checks  - Continue diet  - antibiotics per medicine  - ASA/plavix/Statin/Eliquis   - PT/OT  - Podiatry following  - stepdown to medicine     Dispo: SNF  Follow up in two weeks with Bettina, ARCHIE/TBI, US Graft

## 2023-03-08 NOTE — OP NOTE
DATE OF PROCEDURE:  3/2/23     PREOPERATIVE DIAGNOSIS:   1. 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 Critical limb ischemia      POSTOPERATIVE DIAGNOSIS:    1. same    PROCEDURE:    1) Left external iliac to right SFA bypass with 8mm PTFE  2) Left common and external iliac artery angioplasty and stenting, 8mm Lifestent  3) Aortogram, pelvic angiogram  4) Right lower extremity angiogram  5) Right SFA angioplasty with plain and drug coated 4mm balloon     Surgeon(s) and Role:     * Danny Dunbar MD - Primary     * Jomar Maciel MD - Resident - Assisting     * Dusty Marcus MD - Fellow     Anesthesia: General     Operative Findings: high grade stenosis common iliac and external iliac artery stenosis resolved after stent placement. Moderately diseased SFA but patent, good back-bleeding prior to angiogram intervention. Right SFA angioplasty with two vessel runoff via AT and Peroneal, retrograde filling of PT.      Estimated Blood Loss: 200 mL         Specimens:   Specimen (24h ago, onward)        None              COMPLICATIONS:  None.     PROCEDURE IN DETAIL:  After obtaining consent, he was brought to the Operating   Room.  The abdomen and bilateral leg was circumferentially prepped and draped in the usual sterile fashion.   A time-out was performed.  The right superficial femoral artery had been marked with ultrasound preoperatively.  A longitudinal incision was made overlying this with a scalpel and deepened with electrocautery.  It was encircled after sharp dissection with vessel loops.  We then turned our attention to the left common femoral artery.  Previous incision was incised with a scalpel and deepened with electrocautery.  We remained proximal to the previous occluded femoral-femoral bypass graft and expose the distal external iliac artery and proximal common femoral artery.  Vessel loops were placed around these vessels.  We then performed a tunnel.  The patient was  systemically heparinized and we accessed the left common femoral artery micropuncture technique and performed a left iliac angiogram showing the iliac stenosis.  The left external iliac artery was stented.  We then passed an 8 mm graft through the tunnel ensuring that our tunnel would prevent any kinking of the graft.  We performed a continuous end-to-side anastomosis to the left common femoral artery with a 5 0 Prolene.  We then flushed our graft we heparinized saline and performed an end-to-side anastomosis with 6 0 Prolene to the right SFA.  Prior to our final not being secured we flushed our vessels and then our arteriotomy with heparinized saline.  We then accessed our right graft pepper and performed an angiogram with balloon angioplasty after crossing the SFA occlusion.  Flow was markedly improved to her R foot.  Wounds were irrigated.  The deep fascia was closed with multiple interrupted 2-0 Vicryls.  The superficial fascia of the leg was closed with interrupted 3-0 Vicryl.  The skin was closed with staples.  A sterile dressing was applied.  The patient tolerated the procedure well.

## 2023-03-08 NOTE — PROGRESS NOTES
[facility]  Critical Care - Surgery  Progress Note    Patient Name: Nelsy Marino  MRN: [id number]  Admission Date: 2/27/2023  Hospital Length of Stay: 9 days  Code Status: Full Code  Attending Provider: Danny Dunbar MD  Primary Care Provider: Juan F Garay   Principal Problem: Critical limb ischemia with history of revascularization of same extremity    Subjective:     Hospital/ICU Course:  Admitted to SICU for neurovascular monitoring. Hemodynamically stable with reassuring dopplers.      Interval History/Significant Events: NAOE. SW referrals sent. To go over insurance today. On day 4/5 of zosyn.     Follow-up For: Procedure(s) (LRB):  AMPUTATION, FOOT, TRANSMETATARSAL (Right)    Post-Operative Day: 4 Days Post-Op    Objective:     Vital Signs (Most Recent):  Temp: 99.3 °F (37.4 °C) (03/08/23 0300)  Pulse: 97 (03/08/23 0530)  Resp: (!) 34 (03/08/23 0530)  BP: 119/71 (03/08/23 0500)  SpO2: 95 % (03/08/23 0530)   Vital Signs (24h Range):  Temp:  [98.9 °F (37.2 °C)-99.3 °F (37.4 °C)] 99.3 °F (37.4 °C)  Pulse:  [72-97] 97  Resp:  [15-46] 34  SpO2:  [89 %-100 %] 95 %  BP: ()/() 119/71     Weight: 74 kg (163 lb 2.3 oz)  Body mass index is 28 kg/m².      Intake/Output Summary (Last 24 hours) at 3/8/2023 0614  Last data filed at 3/7/2023 1819  Gross per 24 hour   Intake --   Output 302 ml   Net -302 ml       Physical Exam  Vitals and nursing note reviewed.   Constitutional:       General: She is not in acute distress.     Appearance: Normal appearance. She is obese.   HENT:      Head: Normocephalic and atraumatic.      Nose: No congestion.      Mouth/Throat:      Mouth: Mucous membranes are moist.   Cardiovascular:      Rate and Rhythm: Normal rate and regular rhythm.      Pulses: Normal pulses.      Comments: Monophasic L PT. Monophasic R DP  Pulmonary:      Effort: Pulmonary effort is normal. No respiratory distress.   Abdominal:      General: Abdomen is flat.       Palpations: Abdomen is soft.      Tenderness: There is no abdominal tenderness.   Musculoskeletal:         General: Swelling, deformity and signs of injury present.      Comments: R TMA w dressing intact, no breakthrough bleeding appreciated   Skin:     General: Skin is warm and dry.   Neurological:      General: No focal deficit present.      Mental Status: She is alert and oriented to person, place, and time. Mental status is at baseline.   Psychiatric:         Mood and Affect: Mood normal.         Behavior: Behavior normal.       Vents:  Oxygen Concentration (%): 24 (03/06/23 0343)    Lines/Drains/Airways       Drain  Duration             Female External Urinary Catheter 03/05/23 0132 3 days              Peripheral Intravenous Line  Duration                  External Jugular IV 03/02/23 1203 5 days         Peripheral IV - Single Lumen 03/03/23 1815 20 G Anterior;Distal;Left Upper Arm 4 days                    Significant Labs:    CBC/Anemia Profile:  Recent Labs   Lab 03/06/23 2238 03/07/23  0413 03/08/23  0247   WBC 11.00 10.50 12.26   HGB 7.5* 7.4* 8.2*   HCT 23.7* 24.0* 25.8*    231 281   MCV 81* 82 78*   RDW 14.6* 14.6* 14.4        Chemistries:  Recent Labs   Lab 03/07/23  0413 03/08/23  0247   * 132*   K 4.3 4.6   CL 98 97   CO2 25 25   BUN 33* 32*   CREATININE 1.2 1.2   CALCIUM 8.2* 8.2*   ALBUMIN 1.8* 1.8*   PROT 6.0 6.4   BILITOT 0.5 0.6   ALKPHOS 54* 41*   ALT 13 13   AST 23 23       All pertinent labs within the past 24 hours have been reviewed.    Significant Imaging:  I have reviewed all pertinent imaging results/findings within the past 24 hours.    Assessment/Plan:     Cardiac/Vascular  * Critical limb ischemia with history of revascularization of same extremity  82-year-old female with a past medical history of severe PAD, hypertension, type 2 diabetes, history of DVT who presents with right foot pain.  She underwent fem fem bypass on 3/2. Right SFA baloon stent placed. She was  admitted to SICU for q1hr neurovascular monitoring after large vessel revascularization. S/p R foot transmetatarsal amputation  with podiatry 3/4. Ready for step down.     Neuro/Psych:   -- Sedation: none  -- Pain: scheduled tylenol; oxy and dilaudid prn.              CV:   -- SP revascularization of R LE sp SFA stenting      - eloquis 5 BID   -  Q4 hr nvc  -- HTN   - continue home medications (amlodipine, carvedilol)  -- Severe PAD   - aspirin, plavix, statin  -- HDS       Pulm:   -- Goal O2 sat > 90%  -- Wean as able      Renal:  -- BUN/Cr 32/1.2  -- UOP 300cc and 2 unmeasured occurences/24hr  --hyponatremic to 132- giving 20 lasix and 1L fluid restriction       FEN / GI:   -- Recorded Net -300cc/24hr  -- Replace lytes as needed  -- Nutrition: diabetic diet  -- no mIVF      ID:   -- Tm: Afebrile; WBC 10.5   -- Vanc dc'ed 3/6 after 5 day course, on day 4/5 of zosyn sp source control     -- ID consulted for osteo, appreciate any long term abx recs    -- recommend augmentin if discharged prior to finishing course of zosyn   -- Awaiting culture results  -- No current signs of sepsis, will continue to monitor  --Needs to resume keflex 500mg po TID for life long suppression of right groin wound infection       Heme/Onc:   -- H/h 8.2/25.8  -- Daily CBC      Endo:   -- Gluc goal 140-180  -- . Increasing detemir to 12 units  -- POCT Glucose Q ACHS  -- Continue subQ insulin therapy   --SSI high dose      PPx:   Feeding: diabetic diet  Analgesia/Sedation: controlled on scheduled tylenol, with oxy prn  Thromboembolic prevention: eloquis 5 BID  HOB >30: yes  Stress Ulcer ppx: n/a  Glucose control: Critical care goal 140-180 g/dl  Bowel reg: miralax, bisacodyl suppository prn  Invasive Lines/Drains/Airway:   Pure wick  18G x1  20G x2  Deescalation:       Dispo/Code Status/Palliative:   -- Stepdown to medicine today pending bed availability / Full Code           Critical care was time spent personally by me on the  following activities: development of treatment plan with patient or surrogate and bedside caregivers, discussions with consultants, evaluation of patient's response to treatment, examination of patient, ordering and performing treatments and interventions, ordering and review of laboratory studies, ordering and review of radiographic studies, pulse oximetry, re-evaluation of patient's condition.  This critical care time did not overlap with that of any other provider or involve time for any procedures.     AMEYA PEARSON MD  Critical Care - Surgery  [facility]

## 2023-03-08 NOTE — PLAN OF CARE
ICU Transfer of Care Note  Critical Care Medicine     Admit Date: 2/27/2023  LOS: 9     CC: Critical limb ischemia with history of revascularization of same extremity     Code Status: Full Code      Transfer to Hospital Medicine discussed with Francheska.      HPI and Hospital Course:      HPI:  82-year-old female with a past medical history of severe PAD, hypertension, type 2 diabetes, history of DVT who presents with right foot pain. She reports that her discoloration started since 2/7.  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 She underwent fem fem bypass on 3/2. She was found to have moderately diseased SFA but patent, good back-bleeding prior to angiogram intervention. Right SFA baloon stent placed. She was admitted to SICU for q1hr neurovascular monitoring after large vessel revascularization.      Hospital/ICU Course:  Admitted to the SICU following major vascular revascularization of SFA for intensive monitoring. Hemodynamically stable with reassuring dopplers. Transferring back to hospital medicine.         To Follow Up:      - Q4 dopplers once stepdown  - Blood glucose goals per primary team  - Hyponatremia-- given 20 lasix 3/8, further fluid restrictions  - Discharge planning to SNF        Discharge Plan:      Critical limb ischemia with history of revascularization of same extremity  - Vascular surgery recent SFA stent placement  - Podiatry consulted  - Continue Zosyn  - Eloquis 5 BID  - Neurovascular checks per vascular recommendations  - aspirin, cilazostol    Orthopedic Wet gangrene  Day 4 of 5 of Zosyn.  If she is discharged prior to the 5 days, then can switch to oral augmentin to complete her course.  Follow up in ID clinic in 2-3 weeks    Vascular graft infection  -Has been on cefalexin 500 mg po tid for life long suppression (prior history of right groin wound infection following vascular bypass surgery in 2019).    -Resume cefalexin after course of  zosyn      History of DVT   Eloquis 5 BID     Type 2 diabetes mellitus  Continue inpatient insulin regiment     Essential hypertension  -Continue home medications:  Carvedilol 12.5 BID, Amlodipine 10 mg daily, hydrochlorothiazide 25 mg daily, losartan 100 mg daily     Call with questions [phone].

## 2023-03-08 NOTE — ASSESSMENT & PLAN NOTE
82-year-old female with a past medical history of severe PAD, hypertension, type 2 diabetes, history of DVT who presents with right foot pain.  She underwent fem fem bypass on 3/2. Right SFA baloon stent placed. She was admitted to SICU for q1hr neurovascular monitoring after large vessel revascularization. S/p R foot transmetatarsal amputation  with podiatry 3/4. Ready for step down.     Neuro/Psych:   -- Sedation: none  -- Pain: scheduled tylenol; oxy and dilaudid prn.              CV:   -- SP revascularization of R LE sp SFA stenting      - eloquis 5 BID   -  Q4 hr nvc  -- HTN   - continue home medications (amlodipine, carvedilol)  -- Severe PAD   - aspirin, plavix, statin  -- HDS       Pulm:   -- Goal O2 sat > 90%  -- Wean as able      Renal:  -- BUN/Cr 32/1.2  -- UOP 300cc and 2 unmeasured occurences/24hr      FEN / GI:   -- Recorded Net -300cc/24hr  -- Replace lytes as needed  -- Nutrition: diabetic diet  -- no mIVF      ID:   -- Tm: Afebrile; WBC 10.5   -- Vanc dc'ed 3/6 after 5 day course, on day 4/5 of zosyn sp source control     -- ID consulted for osteo, appreciate any long term abx recs    -- recommend augmentin if discharged prior to finishing course of zosyn   -- Awaiting culture results  -- No current signs of sepsis, will continue to monitor      Heme/Onc:   -- H/h 8.2/25.8  -- Daily CBC      Endo:   -- Gluc goal 140-180  -- POCT Glucose Q ACHS  -- Continue subQ insulin therapy   --SSI high dose      PPx:   Feeding: diabetic diet  Analgesia/Sedation: controlled on scheduled tylenol, with oxy prn  Thromboembolic prevention: eloquis 5 BID  HOB >30: yes  Stress Ulcer ppx: n/a  Glucose control: Critical care goal 140-180 g/dl  Bowel reg: miralax, bisacodyl suppository prn  Invasive Lines/Drains/Airway:   Pure wick  18G x1  20G x2  Deescalation:       Dispo/Code Status/Palliative:   -- Stepdown to medicine today pending bed availability / Full Code

## 2023-03-08 NOTE — NURSING TRANSFER
Nursing Transfer Note      3/8/2023     Reason patient is being transferred: stepdown    Transfer To: room [address]    Transfer via wheelchair    Transfer with 2L per NC to O2, cardiac monitoring    Transported by RN & PCT    Medicines sent: yes    Any special needs or follow-up needed: no    Chart send with patient: Yes    Notified: spouse    Patient reassessed at: 03/08/23 @ 11:15 (date, time)    Upon arrival to floor: cardiac monitor applied, patient oriented to room, call bell in reach, and bed in lowest position. Receiving RN and PCT at bedside. Pt had incontinent BM and urinary void on standing transfer from wheel chair to bed. Pt cleaned and linens changed with NAD noted.

## 2023-03-08 NOTE — ASSESSMENT & PLAN NOTE
Plan:  -OK for discharge from podiatry perspective.  -Antibiotic plan per ID.  -Appreciate vascular surgery recs.  -Dressing changes by nursing.  -RLE WB to heel in surgical shoe for transfers or short distances.   -Elevate RLE at all times while in bed or chair.  -Podiatry will follow.   DC Instructions: Please order ambulatory referral to podiatry for outpatient followup. RLE WB to heel in surgical shoe for transfers or short distances. HH/facility dressing changes R foot 3x/week: cleanse incision with saline, apply betadine soaked adaptic, apply gauze, wrap with cast padding and coban.

## 2023-03-09 LAB
ABO + RH BLD: ABNORMAL
ALBUMIN SERPL BCP-MCNC: 2 G/DL (ref 3.5–5.2)
ALP SERPL-CCNC: 46 U/L (ref 55–135)
ALT SERPL W/O P-5'-P-CCNC: 13 U/L (ref 10–44)
ANION GAP SERPL CALC-SCNC: 8 MMOL/L (ref 8–16)
ANION GAP SERPL CALC-SCNC: 9 MMOL/L (ref 8–16)
APTT BLDCRRT: 37.3 SEC (ref 21–32)
AST SERPL-CCNC: 20 U/L (ref 10–40)
AV INDEX (PROSTH): 0.82
AV MEAN GRADIENT: 6 MMHG
AV PEAK GRADIENT: 10 MMHG
AV VALVE AREA: 2.51 CM2
AV VELOCITY RATIO: 0.75
BASOPHILS # BLD AUTO: 0.02 K/UL (ref 0–0.2)
BASOPHILS # BLD AUTO: 0.02 K/UL (ref 0–0.2)
BASOPHILS NFR BLD: 0.2 % (ref 0–1.9)
BASOPHILS NFR BLD: 0.2 % (ref 0–1.9)
BILIRUB SERPL-MCNC: 0.6 MG/DL (ref 0.1–1)
BILIRUB UR QL STRIP: NEGATIVE
BLD GP AB SCN CELLS X3 SERPL QL: ABNORMAL
BNP SERPL-MCNC: 1089 PG/ML (ref 0–99)
BSA FOR ECHO PROCEDURE: 1.87 M2
BUN SERPL-MCNC: 40 MG/DL (ref 8–23)
BUN SERPL-MCNC: 41 MG/DL (ref 8–23)
CALCIUM SERPL-MCNC: 8.6 MG/DL (ref 8.7–10.5)
CALCIUM SERPL-MCNC: 8.7 MG/DL (ref 8.7–10.5)
CHLORIDE SERPL-SCNC: 97 MMOL/L (ref 95–110)
CHLORIDE SERPL-SCNC: 98 MMOL/L (ref 95–110)
CLARITY UR REFRACT.AUTO: CLEAR
CO2 SERPL-SCNC: 25 MMOL/L (ref 23–29)
CO2 SERPL-SCNC: 28 MMOL/L (ref 23–29)
COLOR UR AUTO: YELLOW
CREAT SERPL-MCNC: 1.5 MG/DL (ref 0.5–1.4)
CREAT SERPL-MCNC: 1.5 MG/DL (ref 0.5–1.4)
CV ECHO LV RWT: 0.32 CM
DIFFERENTIAL METHOD: ABNORMAL
DIFFERENTIAL METHOD: ABNORMAL
DOP CALC AO PEAK VEL: 1.61 M/S
DOP CALC AO VTI: 36.93 CM
DOP CALC LVOT AREA: 3.1 CM2
DOP CALC LVOT DIAMETER: 1.98 CM
DOP CALC LVOT PEAK VEL: 1.2 M/S
DOP CALC LVOT STROKE VOLUME: 92.73 CM3
DOP CALCLVOT PEAK VEL VTI: 30.13 CM
E WAVE DECELERATION TIME: 215.29 MSEC
E/A RATIO: 1.04
E/E' RATIO: 35 M/S
ECHO LV POSTERIOR WALL: 0.7 CM (ref 0.6–1.1)
EJECTION FRACTION: 65 %
EOSINOPHIL # BLD AUTO: 0.1 K/UL (ref 0–0.5)
EOSINOPHIL # BLD AUTO: 0.1 K/UL (ref 0–0.5)
EOSINOPHIL NFR BLD: 0.7 % (ref 0–8)
EOSINOPHIL NFR BLD: 0.9 % (ref 0–8)
ERYTHROCYTE [DISTWIDTH] IN BLOOD BY AUTOMATED COUNT: 14.8 % (ref 11.5–14.5)
ERYTHROCYTE [DISTWIDTH] IN BLOOD BY AUTOMATED COUNT: 14.8 % (ref 11.5–14.5)
EST. GFR  (NO RACE VARIABLE): 34.6 ML/MIN/1.73 M^2
EST. GFR  (NO RACE VARIABLE): 34.6 ML/MIN/1.73 M^2
FERRITIN SERPL-MCNC: 225 NG/ML (ref 20–300)
FOLATE SERPL-MCNC: 4.5 NG/ML (ref 4–24)
FRACTIONAL SHORTENING: 32 % (ref 28–44)
GLUCOSE SERPL-MCNC: 256 MG/DL (ref 70–110)
GLUCOSE SERPL-MCNC: 308 MG/DL (ref 70–110)
GLUCOSE UR QL STRIP: NEGATIVE
HCT VFR BLD AUTO: 22.7 % (ref 37–48.5)
HCT VFR BLD AUTO: 23.1 % (ref 37–48.5)
HGB BLD-MCNC: 7.1 G/DL (ref 12–16)
HGB BLD-MCNC: 7.2 G/DL (ref 12–16)
HGB UR QL STRIP: NEGATIVE
IMM GRANULOCYTES # BLD AUTO: 0.08 K/UL (ref 0–0.04)
IMM GRANULOCYTES # BLD AUTO: 0.09 K/UL (ref 0–0.04)
IMM GRANULOCYTES NFR BLD AUTO: 0.7 % (ref 0–0.5)
IMM GRANULOCYTES NFR BLD AUTO: 0.7 % (ref 0–0.5)
INTERVENTRICULAR SEPTUM: 1 CM (ref 0.6–1.1)
IRON SERPL-MCNC: <10 UG/DL (ref 30–160)
KETONES UR QL STRIP: NEGATIVE
LA MAJOR: 5.11 CM
LA MINOR: 4.65 CM
LA WIDTH: 3.44 CM
LACTATE SERPL-SCNC: 1 MMOL/L (ref 0.5–2.2)
LACTATE SERPL-SCNC: 1.1 MMOL/L (ref 0.5–2.2)
LEFT ATRIUM SIZE: 3.6 CM
LEFT ATRIUM VOLUME INDEX MOD: 40.4 ML/M2
LEFT ATRIUM VOLUME INDEX: 28 ML/M2
LEFT ATRIUM VOLUME MOD: 73.87 CM3
LEFT ATRIUM VOLUME: 51.25 CM3
LEFT INTERNAL DIMENSION IN SYSTOLE: 3 CM (ref 2.1–4)
LEFT VENTRICLE DIASTOLIC VOLUME INDEX: 48.54 ML/M2
LEFT VENTRICLE DIASTOLIC VOLUME: 88.82 ML
LEFT VENTRICLE MASS INDEX: 65 G/M2
LEFT VENTRICLE SYSTOLIC VOLUME INDEX: 19.1 ML/M2
LEFT VENTRICLE SYSTOLIC VOLUME: 34.87 ML
LEFT VENTRICULAR INTERNAL DIMENSION IN DIASTOLE: 4.42 CM (ref 3.5–6)
LEFT VENTRICULAR MASS: 119.47 G
LEUKOCYTE ESTERASE UR QL STRIP: NEGATIVE
LV LATERAL E/E' RATIO: 35 M/S
LV SEPTAL E/E' RATIO: 35 M/S
LYMPHOCYTES # BLD AUTO: 0.9 K/UL (ref 1–4.8)
LYMPHOCYTES # BLD AUTO: 1.1 K/UL (ref 1–4.8)
LYMPHOCYTES NFR BLD: 6.8 % (ref 18–48)
LYMPHOCYTES NFR BLD: 9.2 % (ref 18–48)
MAGNESIUM SERPL-MCNC: 2.1 MG/DL (ref 1.6–2.6)
MCH RBC QN AUTO: 25.4 PG (ref 27–31)
MCH RBC QN AUTO: 25.7 PG (ref 27–31)
MCHC RBC AUTO-ENTMCNC: 31.2 G/DL (ref 32–36)
MCHC RBC AUTO-ENTMCNC: 31.3 G/DL (ref 32–36)
MCV RBC AUTO: 82 FL (ref 82–98)
MCV RBC AUTO: 82 FL (ref 82–98)
MONOCYTES # BLD AUTO: 0.9 K/UL (ref 0.3–1)
MONOCYTES # BLD AUTO: 1 K/UL (ref 0.3–1)
MONOCYTES NFR BLD: 7.4 % (ref 4–15)
MONOCYTES NFR BLD: 7.5 % (ref 4–15)
MV PEAK A VEL: 1.34 M/S
MV PEAK E VEL: 1.4 M/S
MV STENOSIS PRESSURE HALF TIME: 62.43 MS
MV VALVE AREA P 1/2 METHOD: 3.52 CM2
NEUTROPHILS # BLD AUTO: 11 K/UL (ref 1.8–7.7)
NEUTROPHILS # BLD AUTO: 9.8 K/UL (ref 1.8–7.7)
NEUTROPHILS NFR BLD: 81.6 % (ref 38–73)
NEUTROPHILS NFR BLD: 84.1 % (ref 38–73)
NITRITE UR QL STRIP: NEGATIVE
NRBC BLD-RTO: 0 /100 WBC
NRBC BLD-RTO: 0 /100 WBC
PH UR STRIP: 5 [PH] (ref 5–8)
PHOSPHATE SERPL-MCNC: 3.8 MG/DL (ref 2.7–4.5)
PISA TR MAX VEL: 3.41 M/S
PLATELET # BLD AUTO: 328 K/UL (ref 150–450)
PLATELET # BLD AUTO: 347 K/UL (ref 150–450)
PMV BLD AUTO: 10.2 FL (ref 9.2–12.9)
PMV BLD AUTO: 10.6 FL (ref 9.2–12.9)
POCT GLUCOSE: 291 MG/DL (ref 70–110)
POCT GLUCOSE: 294 MG/DL (ref 70–110)
POCT GLUCOSE: 325 MG/DL (ref 70–110)
POCT GLUCOSE: 331 MG/DL (ref 70–110)
POCT GLUCOSE: 391 MG/DL (ref 70–110)
POTASSIUM SERPL-SCNC: 4.5 MMOL/L (ref 3.5–5.1)
POTASSIUM SERPL-SCNC: 4.6 MMOL/L (ref 3.5–5.1)
PROT SERPL-MCNC: 6.5 G/DL (ref 6–8.4)
PROT UR QL STRIP: NEGATIVE
RA MAJOR: 4.59 CM
RA PRESSURE: 8 MMHG
RA WIDTH: 3.6 CM
RBC # BLD AUTO: 2.76 M/UL (ref 4–5.4)
RBC # BLD AUTO: 2.83 M/UL (ref 4–5.4)
RETICS/RBC NFR AUTO: 3 % (ref 0.5–2.5)
RIGHT VENTRICULAR END-DIASTOLIC DIMENSION: 3.12 CM
RV TISSUE DOPPLER FREE WALL SYSTOLIC VELOCITY 1 (APICAL 4 CHAMBER VIEW): 10.87 CM/S
SATURATED IRON: ABNORMAL % (ref 20–50)
SINUS: 3.04 CM
SODIUM SERPL-SCNC: 132 MMOL/L (ref 136–145)
SODIUM SERPL-SCNC: 133 MMOL/L (ref 136–145)
SP GR UR STRIP: 1.01 (ref 1–1.03)
TDI LATERAL: 0.04 M/S
TDI SEPTAL: 0.04 M/S
TDI: 0.04 M/S
TOTAL IRON BINDING CAPACITY: 210 UG/DL (ref 250–450)
TR MAX PG: 47 MMHG
TRANSFERRIN SERPL-MCNC: 142 MG/DL (ref 200–375)
TRICUSPID ANNULAR PLANE SYSTOLIC EXCURSION: 1.87 CM
TROPONIN I SERPL DL<=0.01 NG/ML-MCNC: 0.07 NG/ML (ref 0–0.03)
TROPONIN I SERPL DL<=0.01 NG/ML-MCNC: 0.08 NG/ML (ref 0–0.03)
TV REST PULMONARY ARTERY PRESSURE: 55 MMHG
URN SPEC COLLECT METH UR: NORMAL
VIT B12 SERPL-MCNC: 377 PG/ML (ref 210–950)
WBC # BLD AUTO: 11.97 K/UL (ref 3.9–12.7)
WBC # BLD AUTO: 13.03 K/UL (ref 3.9–12.7)

## 2023-03-09 PROCEDURE — 51798 US URINE CAPACITY MEASURE: CPT

## 2023-03-09 PROCEDURE — 84484 ASSAY OF TROPONIN QUANT: CPT | Performed by: HOSPITALIST

## 2023-03-09 PROCEDURE — 86870 RBC ANTIBODY IDENTIFICATION: CPT | Performed by: HOSPITALIST

## 2023-03-09 PROCEDURE — 25000003 PHARM REV CODE 250: Performed by: STUDENT IN AN ORGANIZED HEALTH CARE EDUCATION/TRAINING PROGRAM

## 2023-03-09 PROCEDURE — 20600001 HC STEP DOWN PRIVATE ROOM

## 2023-03-09 PROCEDURE — 97530 THERAPEUTIC ACTIVITIES: CPT | Mod: CQ

## 2023-03-09 PROCEDURE — 63600175 PHARM REV CODE 636 W HCPCS

## 2023-03-09 PROCEDURE — 83605 ASSAY OF LACTIC ACID: CPT | Performed by: HOSPITALIST

## 2023-03-09 PROCEDURE — 85045 AUTOMATED RETICULOCYTE COUNT: CPT | Performed by: HOSPITALIST

## 2023-03-09 PROCEDURE — 25000003 PHARM REV CODE 250: Performed by: INTERNAL MEDICINE

## 2023-03-09 PROCEDURE — 86922 COMPATIBILITY TEST ANTIGLOB: CPT | Performed by: INTERNAL MEDICINE

## 2023-03-09 PROCEDURE — 82746 ASSAY OF FOLIC ACID SERUM: CPT | Performed by: HOSPITALIST

## 2023-03-09 PROCEDURE — 36415 COLL VENOUS BLD VENIPUNCTURE: CPT | Performed by: STUDENT IN AN ORGANIZED HEALTH CARE EDUCATION/TRAINING PROGRAM

## 2023-03-09 PROCEDURE — 94640 AIRWAY INHALATION TREATMENT: CPT

## 2023-03-09 PROCEDURE — 63600175 PHARM REV CODE 636 W HCPCS: Performed by: HOSPITALIST

## 2023-03-09 PROCEDURE — 83735 ASSAY OF MAGNESIUM: CPT | Performed by: HOSPITALIST

## 2023-03-09 PROCEDURE — 25000003 PHARM REV CODE 250

## 2023-03-09 PROCEDURE — 97110 THERAPEUTIC EXERCISES: CPT

## 2023-03-09 PROCEDURE — 86902 BLOOD TYPE ANTIGEN DONOR EA: CPT | Performed by: HOSPITALIST

## 2023-03-09 PROCEDURE — 99900035 HC TECH TIME PER 15 MIN (STAT)

## 2023-03-09 PROCEDURE — 85025 COMPLETE CBC W/AUTO DIFF WBC: CPT | Performed by: STUDENT IN AN ORGANIZED HEALTH CARE EDUCATION/TRAINING PROGRAM

## 2023-03-09 PROCEDURE — 94799 UNLISTED PULMONARY SVC/PX: CPT

## 2023-03-09 PROCEDURE — 86900 BLOOD TYPING SEROLOGIC ABO: CPT | Performed by: HOSPITALIST

## 2023-03-09 PROCEDURE — 25000003 PHARM REV CODE 250: Performed by: SURGERY

## 2023-03-09 PROCEDURE — 81003 URINALYSIS AUTO W/O SCOPE: CPT | Performed by: STUDENT IN AN ORGANIZED HEALTH CARE EDUCATION/TRAINING PROGRAM

## 2023-03-09 PROCEDURE — 84100 ASSAY OF PHOSPHORUS: CPT | Performed by: HOSPITALIST

## 2023-03-09 PROCEDURE — 80053 COMPREHEN METABOLIC PANEL: CPT | Performed by: STUDENT IN AN ORGANIZED HEALTH CARE EDUCATION/TRAINING PROGRAM

## 2023-03-09 PROCEDURE — 82728 ASSAY OF FERRITIN: CPT | Performed by: HOSPITALIST

## 2023-03-09 PROCEDURE — 51701 INSERT BLADDER CATHETER: CPT

## 2023-03-09 PROCEDURE — 99233 PR SUBSEQUENT HOSPITAL CARE,LEVL III: ICD-10-PCS | Mod: ,,, | Performed by: INTERNAL MEDICINE

## 2023-03-09 PROCEDURE — 82607 VITAMIN B-12: CPT | Performed by: HOSPITALIST

## 2023-03-09 PROCEDURE — 99233 SBSQ HOSP IP/OBS HIGH 50: CPT | Mod: ,,, | Performed by: INTERNAL MEDICINE

## 2023-03-09 PROCEDURE — 27000221 HC OXYGEN, UP TO 24 HOURS

## 2023-03-09 PROCEDURE — 99900031 HC PATIENT EDUCATION (STAT)

## 2023-03-09 PROCEDURE — 25000242 PHARM REV CODE 250 ALT 637 W/ HCPCS: Performed by: HOSPITALIST

## 2023-03-09 PROCEDURE — 85730 THROMBOPLASTIN TIME PARTIAL: CPT | Performed by: STUDENT IN AN ORGANIZED HEALTH CARE EDUCATION/TRAINING PROGRAM

## 2023-03-09 PROCEDURE — 63600175 PHARM REV CODE 636 W HCPCS: Performed by: INTERNAL MEDICINE

## 2023-03-09 PROCEDURE — 84466 ASSAY OF TRANSFERRIN: CPT | Performed by: HOSPITALIST

## 2023-03-09 PROCEDURE — 94761 N-INVAS EAR/PLS OXIMETRY MLT: CPT

## 2023-03-09 PROCEDURE — 97530 THERAPEUTIC ACTIVITIES: CPT

## 2023-03-09 RX ORDER — TALC
6 POWDER (GRAM) TOPICAL NIGHTLY
Status: DISCONTINUED | OUTPATIENT
Start: 2023-03-09 | End: 2023-03-20 | Stop reason: HOSPADM

## 2023-03-09 RX ORDER — FUROSEMIDE 10 MG/ML
40 INJECTION INTRAMUSCULAR; INTRAVENOUS 2 TIMES DAILY
Status: DISCONTINUED | OUTPATIENT
Start: 2023-03-09 | End: 2023-03-10

## 2023-03-09 RX ORDER — LEVALBUTEROL 1.25 MG/.5ML
1.25 SOLUTION, CONCENTRATE RESPIRATORY (INHALATION) EVERY 8 HOURS PRN
Status: DISCONTINUED | OUTPATIENT
Start: 2023-03-09 | End: 2023-03-20 | Stop reason: HOSPADM

## 2023-03-09 RX ORDER — FUROSEMIDE 10 MG/ML
80 INJECTION INTRAMUSCULAR; INTRAVENOUS ONCE
Status: COMPLETED | OUTPATIENT
Start: 2023-03-09 | End: 2023-03-09

## 2023-03-09 RX ORDER — LEVALBUTEROL 1.25 MG/.5ML
1.25 SOLUTION, CONCENTRATE RESPIRATORY (INHALATION)
Status: DISCONTINUED | OUTPATIENT
Start: 2023-03-09 | End: 2023-03-17

## 2023-03-09 RX ORDER — FUROSEMIDE 10 MG/ML
80 INJECTION INTRAMUSCULAR; INTRAVENOUS 2 TIMES DAILY
Status: DISCONTINUED | OUTPATIENT
Start: 2023-03-09 | End: 2023-03-09

## 2023-03-09 RX ADMIN — INSULIN DETEMIR 15 UNITS: 100 INJECTION, SOLUTION SUBCUTANEOUS at 10:03

## 2023-03-09 RX ADMIN — MUPIROCIN: 20 OINTMENT TOPICAL at 08:03

## 2023-03-09 RX ADMIN — MUPIROCIN: 20 OINTMENT TOPICAL at 09:03

## 2023-03-09 RX ADMIN — APIXABAN 5 MG: 5 TABLET, FILM COATED ORAL at 08:03

## 2023-03-09 RX ADMIN — PIPERACILLIN AND TAZOBACTAM 4.5 G: 4; .5 INJECTION, POWDER, LYOPHILIZED, FOR SOLUTION INTRAVENOUS; PARENTERAL at 05:03

## 2023-03-09 RX ADMIN — IPRATROPIUM BROMIDE AND ALBUTEROL SULFATE 3 ML: 2.5; .5 SOLUTION RESPIRATORY (INHALATION) at 03:03

## 2023-03-09 RX ADMIN — INSULIN ASPART 9 UNITS: 100 INJECTION, SOLUTION INTRAVENOUS; SUBCUTANEOUS at 08:03

## 2023-03-09 RX ADMIN — AMLODIPINE BESYLATE 10 MG: 10 TABLET ORAL at 08:03

## 2023-03-09 RX ADMIN — INSULIN ASPART 8 UNITS: 100 INJECTION, SOLUTION INTRAVENOUS; SUBCUTANEOUS at 10:03

## 2023-03-09 RX ADMIN — ACETAMINOPHEN 1000 MG: 500 TABLET ORAL at 10:03

## 2023-03-09 RX ADMIN — GUAIFENESIN AND DEXTROMETHORPHAN HYDROBROMIDE 1 TABLET: 600; 30 TABLET, EXTENDED RELEASE ORAL at 10:03

## 2023-03-09 RX ADMIN — FUROSEMIDE 80 MG: 10 INJECTION, SOLUTION INTRAMUSCULAR; INTRAVENOUS at 08:03

## 2023-03-09 RX ADMIN — TRAMADOL HYDROCHLORIDE 25 MG: 50 TABLET, COATED ORAL at 05:03

## 2023-03-09 RX ADMIN — ATORVASTATIN CALCIUM 40 MG: 40 TABLET, FILM COATED ORAL at 10:03

## 2023-03-09 RX ADMIN — APIXABAN 5 MG: 5 TABLET, FILM COATED ORAL at 10:03

## 2023-03-09 RX ADMIN — PIPERACILLIN AND TAZOBACTAM 4.5 G: 4; .5 INJECTION, POWDER, LYOPHILIZED, FOR SOLUTION INTRAVENOUS; PARENTERAL at 08:03

## 2023-03-09 RX ADMIN — IPRATROPIUM BROMIDE AND ALBUTEROL SULFATE 3 ML: 2.5; .5 SOLUTION RESPIRATORY (INHALATION) at 11:03

## 2023-03-09 RX ADMIN — FUROSEMIDE 40 MG: 10 INJECTION, SOLUTION INTRAMUSCULAR; INTRAVENOUS at 04:03

## 2023-03-09 RX ADMIN — PIPERACILLIN AND TAZOBACTAM 4.5 G: 4; .5 INJECTION, POWDER, LYOPHILIZED, FOR SOLUTION INTRAVENOUS; PARENTERAL at 12:03

## 2023-03-09 RX ADMIN — ASPIRIN 81 MG: 81 TABLET, COATED ORAL at 08:03

## 2023-03-09 RX ADMIN — INSULIN ASPART 12 UNITS: 100 INJECTION, SOLUTION INTRAVENOUS; SUBCUTANEOUS at 12:03

## 2023-03-09 RX ADMIN — ACETAMINOPHEN 1000 MG: 500 TABLET ORAL at 06:03

## 2023-03-09 RX ADMIN — POLYETHYLENE GLYCOL 3350 17 G: 17 POWDER, FOR SOLUTION ORAL at 08:03

## 2023-03-09 RX ADMIN — ACETAMINOPHEN 1000 MG: 500 TABLET ORAL at 01:03

## 2023-03-09 RX ADMIN — Medication 6 MG: at 10:03

## 2023-03-09 RX ADMIN — IPRATROPIUM BROMIDE AND ALBUTEROL SULFATE 3 ML: 2.5; .5 SOLUTION RESPIRATORY (INHALATION) at 08:03

## 2023-03-09 RX ADMIN — CLOPIDOGREL BISULFATE 75 MG: 75 TABLET ORAL at 08:03

## 2023-03-09 NOTE — PROGRESS NOTES
[facility] Medicine  Progress Note    Patient Name: Nelsy Marino  MRN: [id number]  Patient Class: IP- Inpatient   Admission Date: 2/27/2023  Length of Stay: 10 days  Attending Physician: Tanner Coello MD  Primary Care Provider: Juan F Garay        Subjective:     Principal Problem:Critical limb ischemia with history of revascularization of same extremity        HPI:  This is an 82-year-old female with a past medical history of severe PAD, hypertension, type 2 diabetes, history of DVT who presents with right foot pain.    Patient reports worsening right foot pain, swelling and discoloration that started after she had an ingrown toenail removed by her podiatrist.  She reports that her discoloration started since 2/7. Of note, the patient underwent an angiogram on 02/23 by vascular surgery. In the ED, she was tachycardic (112), and hypertensive.  Labs showed microcytic anemia (10.2), elevated sed rate (112), elevated CRP (56.5), elevated lactic acid (2.5).  Foot x-ray showed DJD and spurs on the calcaneus with no acute process.  Vascular surgery was consulted and recommended continuing antibiotics and evaluating in the morning.  She was given morphine 4 mg IV, vancomycin, Zosyn and was admitted for further management.      Overview/Hospital Course:   82-year-old female with a past medical history of severe PAD, hypertension, type 2 diabetes, history of DVT admitted on 02/27/2023 for gangrene of right toes. Of note, patient recent underwent LE angiogram by Dr. Tran on 02/23/23. Per OP report, was able to get successful revascularization / resolution of stenosis. XR right foot with no fracture, dislocation, bone destruction, or foreign body seen.  There is DJD and spurs on the calcaneus.  Was started on IV Zosyn and vancomycin on admit. Blood  cx with NGTD. On 02/28, patient with hypoglycemia but was asymptomatic. Improved with glucogen. Likely due to NPO status and was still receiving  insulin.  Cardiology consulted for preoperative clearance.  Recommended stress test.  Normal myocardial perfusion scan.  Cardiology deem patient at acceptable cardiac risk for [facility] surgery/anesthesia.  Vascular surgery recommended  transfer to [facility] [place] hybrid [place] surgery 3/2/23. Plans for RLE bypass in the AM. Transfer initiated       Interval History: Patient sitting in chair, no acute distress. Family at bedside. Overnight, patient had increased oxygen requirement which increased from 2 L to 12 L NC. CXR showed pulmonary edema and possible infectious pneumonitis. Patient was started on IV lasix and zosyn with oxygen requirement decreasing to 5 L NC today. Patient reports SOB improving while on diuresis. She does note right foot pain that is poorly controlled with scheduled tylenol. Also mentions a cough that started during this admission. Patient alert and oriented x3 but family notes that she has moments of confusion which is likely related to delirium and sleep disturbances. Delirium precautions ordered and patient started on low dose tramadol prn. Patient also notes good UOP, regular bowel movements and good po intake. Denies fever, chills, chest pain, N/V, abdominal pain. Patient does note some weakness and is working well with therapy and anticipate discharge to SNF when medically stable.      Review of Systems   Constitutional:  Positive for activity change and fatigue. Negative for chills and fever.   HENT:  Negative for congestion.    Eyes:  Negative for visual disturbance.   Respiratory:  Positive for cough and shortness of breath.    Cardiovascular:  Positive for leg swelling. Negative for chest pain.   Gastrointestinal:  Negative for abdominal distention, abdominal pain, nausea and vomiting.   Genitourinary:  Negative for dysuria.   Musculoskeletal:  Positive for arthralgias (right foot) and myalgias (right foot). Negative for back pain.   Skin:  Negative for rash and wound.   Neurological:  Positive for  weakness. Negative for dizziness.   Psychiatric/Behavioral:  Negative for confusion.    Objective:     Vital Signs (Most Recent):  Temp: 99.1 °F (37.3 °C) (03/09/23 1540)  Pulse: 91 (03/09/23 1540)  Resp: 18 (03/09/23 1540)  BP: (!) 125/58 (03/09/23 1540)  SpO2: 97 % (03/09/23 1540)   Vital Signs (24h Range):  Temp:  [98.6 °F (37 °C)-101.4 °F (38.6 °C)] 99.1 °F (37.3 °C)  Pulse:  [] 91  Resp:  [15-22] 18  SpO2:  [88 %-100 %] 97 %  BP: (108-139)/(53-60) 125/58     Weight: 77.6 kg (171 lb)  Body mass index is 29.35 kg/m².    Intake/Output Summary (Last 24 hours) at 3/9/2023 1647  Last data filed at 3/9/2023 1432  Gross per 24 hour   Intake 1423 ml   Output 2525 ml   Net -1102 ml      Physical Exam  Constitutional:       Appearance: Normal appearance.   HENT:      Head: Normocephalic.      Mouth/Throat:      Mouth: Mucous membranes are moist.   Eyes:      Extraocular Movements: Extraocular movements intact.      Pupils: Pupils are equal, round, and reactive to light.   Neck:      Comments: JVD absent  Cardiovascular:      Rate and Rhythm: Normal rate and regular rhythm.      Pulses: Normal pulses.      Heart sounds: Normal heart sounds.   Pulmonary:      Effort: Respiratory distress present.      Breath sounds: Normal breath sounds.   Abdominal:      General: Bowel sounds are normal. There is no distension.      Palpations: Abdomen is soft.      Tenderness: There is no abdominal tenderness.   Musculoskeletal:         General: Normal range of motion.      Cervical back: Neck supple.      Right lower leg: Edema present.      Left lower leg: Edema present.      Comments: Right TMA with dressing c/d/I and surgical shoe in place   Neurological:      General: No focal deficit present.      Mental Status: She is alert.   Psychiatric:         Mood and Affect: Mood normal.         Cognition and Memory: Cognition is impaired. Memory is impaired.       Significant Labs: A1C:   Recent Labs   Lab 02/28/23  0549   HGBA1C 8.2*      Blood Culture:   Recent Labs   Lab 03/08/23 2325   LABBLOO No Growth to date  No Growth to date     CBC:   Recent Labs   Lab 03/08/23  0247 03/08/23 2325 03/09/23  0430   WBC 12.26 11.97 13.03*   HGB 8.2* 7.1* 7.2*   HCT 25.8* 22.7* 23.1*    328 347     CMP:   Recent Labs   Lab 03/08/23 0247 03/08/23 2325 03/09/23  0430   * 132* 133*   K 4.6 4.5 4.6   CL 97 98 97   CO2 25 25 28   * 256* 308*   BUN 32* 40* 41*   CREATININE 1.2 1.5* 1.5*   CALCIUM 8.2* 8.6* 8.7   PROT 6.4  --  6.5   ALBUMIN 1.8*  --  2.0*   BILITOT 0.6  --  0.6   ALKPHOS 41*  --  46*   AST 23  --  20   ALT 13  --  13   ANIONGAP 10 9 8     Coagulation:   Recent Labs   Lab 03/09/23  0430   APTT 37.3*     Lactic Acid:   Recent Labs   Lab 03/08/23 2325 03/09/23  0430   LACTATE 1.0 1.1     Magnesium:   Recent Labs   Lab 03/08/23  0247 03/09/23  0430   MG 2.2 2.1     POCT Glucose:   Recent Labs   Lab 03/09/23  0735 03/09/23  1151 03/09/23  1540   POCTGLUCOSE 294* 325* 291*     Troponin:   Recent Labs   Lab 03/08/23 2325 03/09/23  0430   TROPONINI 0.079* 0.067*     TSH: No results for input(s): TSH in the last 4320 hours.  Urine Studies:   Recent Labs   Lab 03/09/23  1427   COLORU Yellow   APPEARANCEUA Clear   PHUR 5.0   SPECGRAV 1.015   PROTEINUA Negative   GLUCUA Negative   KETONESU Negative   BILIRUBINUA Negative   OCCULTUA Negative   NITRITE Negative   LEUKOCYTESUR Negative       Significant Imaging: I have reviewed all pertinent imaging results/findings within the past 24 hours.      Assessment/Plan:      * Critical limb ischemia with history of revascularization of same extremity  -Vascular surgery consult: [place] [facility]2/23 for RLE bypass today with Dr. Dunbar  -Podiatry consulted  -Continue vancomycin/Zosyn, day 4  -Continue heparin gtt  -aspirin, cilazostol      S/P transmetatarsal amputation of foot, right  - stable  - appreciated vascular surgery and podiatry recs  -- s/p [facility] 3/4  --  low threshold to escalate antibiotics. If her graft becomes infected and requires antibiotics, she has no other options left for revascularizations  -- Q4H neurovasc checks  -- ASA/plavix/Statin/Eliquis   -- PT/OT  --Dressing changes by nursing.  --RLE WB to heel in surgical shoe for transfers or short distances.   --Elevate RLE at all times while in bed or chair.  -- DC Instructions: Please order ambulatory referral to podiatry for outpatient followup. RLE WB to heel in surgical shoe for transfers or short distances. HH/facility dressing changes R foot 3x/week: cleanse incision with saline, apply betadine soaked adaptic, apply gauze, wrap with cast padding and coban.   - scheduled tylenol and prn tramadol      Vascular graft infection  - on chronic suppression with keflex  - escalated to zosyn due to increased oxygen requirement to cover to possible PNA      Hypoglycemia  Better now  Continue levemir and LDSS    Sepsis  Sepsis element resolved; see above   -Right foot appears to have wet gangrene on exam  -Blood cx  NGTD  - abx as above    History of DVT (deep vein thrombosis)  Not on AC outpatient.      Acute hypoxemic respiratory failure  Patient with Hypercapnic and Hypoxic Respiratory failure which is Acute on chronic.  she is not on home oxygen. Supplemental oxygen was provided and noted-  .   Signs/symptoms of respiratory failure include- tachypnea, increased work of breathing and lethargy. Contributing diagnoses includes - CHF, Obesity Hypoventilation and Pneumonia Labs and images were reviewed. Patient Has recent ABG, which has been reviewed. Will treat underlying causes and adjust management of respiratory failure.    PLAN:  - increased from 2 L NC to 12 L NC on 3/78  - now weaned to 5 L NC  - CXR showed pulmonary edema with no consolidation  - switched to xoponex due to tremors on duonebs  - incentive spirometry   - mucinex and prn tessalon perles  - chest PT and acapella   - OOB to chair  -  IMPROVING        Type 2 diabetes mellitus  Stable from hypoglycemia standpoint; continue levemir and LDSSI      Essential hypertension  -Continue home medications:  Amlodipine 10 mg daily, hydrochlorothiazide 25 mg daily, losartan 100 mg daily        VTE Risk Mitigation (From admission, onward)         Ordered     apixaban tablet 5 mg  2 times daily         03/06/23 0852     Reason for No Pharmacological VTE Prophylaxis  Once        Question:  Reasons:  Answer:  IV Heparin w/in 24 hrs. Pre or Post-Op    03/02/23 1918     IP VTE HIGH RISK PATIENT  Once         03/02/23 1918     Place sequential compression device  Until discontinued         02/27/23 2200                Discharge Planning   JUAN ALBERTO: 3/15/2023     Code Status: Full Code   Is the patient medically ready for discharge?:     Reason for patient still in hospital (select all that apply): Patient unstable, Patient trending condition, Laboratory test, Treatment, Consult recommendations, PT / OT recommendations and Pending disposition  Discharge Plan A: Long-term acute care facility (LTAC)   Discharge Delays: None known at this time        Time spent in care of patient: > 35 minutes       Tanner Coello MD  Department of Hospital Medicine   [facility]

## 2023-03-09 NOTE — CARE UPDATE
Rapid Response Nurse Follow-up Note     Followed up with patient for a rapid response.  No acute issues or additional concerns at this time. CXR, recemic epi treatment and 12-lead EKG complete. Vitals as follows on 5 L HIflow NC: HR 98, /60 (86) and O2 sat 98%. Patient remains febrile will receive scheduled tylenol. Please recheck temperature within 1 hour of receiving dose. Continuous pulse ox added to tele.  Reviewed plan of care with Bedside RNSuzanne .   Please call Rapid Response RN, Miriam Roque RN if any additional monitoring required or questions or concerns arise at [phone].

## 2023-03-09 NOTE — RESPIRATORY THERAPY
RAPID RESPONSE RESPIRATORY THERAPY NOTE             Code Status: Full Code   : 1940  Bed: 1062/1062 A:   MRN: [id number]  Time page Received:   Time Rapid Response RT at Bedside:   Time Rapid Response RT left Bedside: 0    SITUATION    Evaluated patient for: Increasing oxygen demand    BACKGROUND    Why is the patient in the hospital?: Critical limb ischemia with history of revascularization of same extremity    Patient has a past medical history of CHF (congestive heart failure), Diabetes mellitus, DVT (deep venous thrombosis), Hypertension, and Miscarriage.    24 Hours Vitals Range:  Temp:  [98.6 °F (37 °C)-101.4 °F (38.6 °C)]   Pulse:  []   Resp:  [12-33]   BP: (109-139)/(45-79)   SpO2:  [80 %-100 %]     Labs:    Recent Labs     23  0247 23  2325 23  0430   * 132* 133*   K 4.6 4.5 4.6   CL 97 98 97   CO2 25 25 28   CREATININE 1.2 1.5* 1.5*   * 256* 308*   PHOS  --   --  3.8   MG 2.2  --  2.1        Recent Labs     23  1849   PH 7.459*   PCO2 39.4   PO2 72*   HCO3 28.0   POCSATURATED 95   BE 4       ASSESSMENT/INTERVENTIONS  Patient SpO2 98% on 12L HFNC. Decreased flow to 8L and obtained ABG which resulted WNL. Patient not easily aroused; possible patient given too much hydro. CXR ordered, no further respiratory interventions at this time.    Last Vitals: Temp: 99.6 °F (37.6 °C) (611)  Pulse: 88 (448)  Resp: 18 (448)  BP: 139/60 (448)  SpO2: 99 % (448)  Level of Consciousness: Level of Consciousness (AVPU): alert  Respiratory Effort: Respiratory Effort: Slight, Labored  Expansion/Accessory Muscle Usage: Expansion/Accessory Muscles/Retractions: expansion symmetric  All Lung Field Breath Sounds: All Lung Fields Breath Sounds: Anterior:, Lateral:, diminished  PIERO Breath Sounds: clear, equal bilaterally  LLL Breath Sounds: clear, equal bilaterally  RUL Breath Sounds: clear, equal bilaterally  RML Breath Sounds: clear, equal  bilaterally  RLL Breath Sounds: clear, equal bilaterally  O2 Device/Concentration: 8L HFNC  NIPPV: No Surgical airway: No Vent: No  ETCO2 monitored:    Ambu at bedside: Ambu bag with the patient?: Yes, Adult Ambu    Active Orders   Respiratory Care    Incentive spirometry     Frequency: Q4H     Number of Occurrences: Until Specified     Order Comments: Q4 while awake until discharge.  Educate patient in use; Keep incentive spirometer within reach; and Document incentive spirometer volume every 4 hours while awake.    ((10 sets per hour (3-5 inspirations per set)) on original order)      Inhalation Treatment Q4H     Frequency: Q4H     Number of Occurrences: Until Specified    Inhalation Treatment Q4H PRN     Frequency: Q4H PRN     Number of Occurrences: Until Specified    Oxygen Continuous     Frequency: Continuous     Number of Occurrences: Until Specified     Order Questions:      Device type: Low flow      Device: Nasal Cannula (1- 5 Liters)      LPM: 1      Titrate O2 per Oxygen Titration Protocol: Yes      To maintain SpO2 goal of: >= 90%      Notify MD of: Inability to achieve desired SpO2; Sudden change in patient status and requires 20% increase in FiO2; Patient requires >60% FiO2    Oxygen Continuous     Frequency: Continuous     Number of Occurrences: Until Specified     Order Questions:      Device type: High flow      Device: High Flow Nasal Cannula (6 -15 Liters)      LPM: 12      Titrate O2 per Oxygen Titration Protocol: Yes      To maintain SpO2 goal of: >= 90%      Notify MD of: Inability to achieve desired SpO2; Sudden change in patient status and requires 20% increase in FiO2; Patient requires >60% FiO2    POCT ARTERIAL BLOOD GAS Blood Gas     Frequency: Once     Number of Occurrences: 1 Occurrences     Order Comments: Notify Physician if: see parameters below.       Order Questions:      Component: Blood Gas    Pulse Oximetry Continuous     Frequency: Continuous     Number of Occurrences: Until  Specified       RECOMMENDATIONS  ?  We recommend: RRT Recs: Continue POC per primary team.      FOLLOW-UP    Disposition: Remain in room 1062.    Please call back the Rapid Response RT, Kyung Marino, CHEY at x [phone] for any questions or concerns.

## 2023-03-09 NOTE — ASSESSMENT & PLAN NOTE
- stable  - appreciated vascular surgery and podiatry recs  -- s/p TMA 3/4  -- low threshold to escalate antibiotics. If her graft becomes infected and requires antibiotics, she has no other options left for revascularizations  -- Q4H neurovasc checks  -- ASA/plavix/Statin/Eliquis   -- PT/OT  --Dressing changes by nursing.  --RLE WB to heel in surgical shoe for transfers or short distances.   --Elevate RLE at all times while in bed or chair.  -- DC Instructions: Please order ambulatory referral to podiatry for outpatient followup. RLE WB to heel in surgical shoe for transfers or short distances. HH/facility dressing changes R foot 3x/week: cleanse incision with saline, apply betadine soaked adaptic, apply gauze, wrap with cast padding and coban.   - scheduled tylenol and prn tramadol

## 2023-03-09 NOTE — CODE/ RAPID DOCUMENTATION
RAPID RESPONSE NURSE NOTE        Admit Date: 2023  LOS: 9  Code Status: Full Code   Date of Consult: 2023  : 1940  Age: 82 y.o.  Weight:   Wt Readings from Last 1 Encounters:   23 74 kg (163 lb 2.3 oz)     Sex: female  Race: Black or    Bed: [address] [place] [address] A:   MRN: [id number]  Time Rapid Response Team page Received:   Time Rapid Response Team at Bedside:   Time Rapid Response Team left Bedside:   Was the patient discharged from an ICU this admission? No   Was the patient discharged from a PACU within last 24 hours? No   Did the patient receive conscious sedation/general anesthesia in last 24 hours? No  Was the patient in the ED within the past 24 hours? No  Was the patient on NIPPV within the past 24 hours? No   Did this progress into an ARC or CPA: no  Attending Physician: Percy Pritchard MD  Primary Service: [facility] MED R       SITUATION    Notified by bedside RN via phone call.  Reason for alert: Increasing Oxygen Requirements/Respiratory Distress  Called to evaluate the patient for Respiratory    BACKGROUND     Why is the patient in the hospital?: Critical limb ischemia with history of revascularization of same extremity    Patient has a past medical history of CHF (congestive heart failure), Diabetes mellitus, DVT (deep venous thrombosis), Hypertension, and Miscarriage.    Last Vitals:  Temp: 100 °F (37.8 °C) ( 1524)  Pulse: 103 ( 1807)  Resp: 22 ( 1807)  BP: 135/67 ( 1524)  SpO2: 93 % ( 1825)    24 Hours Vitals Range:  Temp:  [98.6 °F (37 °C)-100 °F (37.8 °C)]   Pulse:  []   Resp:  [12-41]   BP: (109-163)/(45-79)   SpO2:  [80 %-99 %]     Labs:  Recent Labs     233 23  0247   WBC 11.00 10.50 12.26   HGB 7.5* 7.4* 8.2*   HCT 23.7* 24.0* 25.8*    231 281       Recent Labs     23  0413 23  0247   * 132* 132*   K 4.9 4.3 4.6   CL 96 98 97   CO2 28 25 25    CREATININE 1.4 1.2 1.2   GLU 90 203* 221*        No results for input(s): PH, PCO2, PO2, HCO3, POCSATURATED, BE in the last 72 hours.     ASSESSMENT    The Rapid Response Team was called to assist with evaluating this patient for increasing oxygen requirements and newly onset respiratory distress. Reportedly, this patient was stepped down from the ICU earlier this day on 2 L NC with no distress noted. Over the course of the afternoon, her oxygen requirements increased to 12L HF NC. The Rapid Response Team arrived to the bedside to find the patient sitting upright in bed, drowsy, but easily arouses to verbal stimuli. The patient was fully oriented, able to answer all questions appropriately, and follow all commands. She complained of pain to her amputation site which she said was controlled with the pain medications she received earlier this evening. Intermittent pulse ox revealed SpO2 96%, NIBP stable 130's/60's, HR 90's, Temp 102 (oral). Rapid Response RT performed assessment and decreased oxygen from 12 to 8 LPM via NC. On assessment, the patient displayed audible inspiratory and expiratory wheezing, congestive cough, and tachypnea. Bedside RN notified Dr. Pritchard with the primary team of the patient's condition and orders were placed remotely per MD. X-Ray notified of stat order and stated they were on the way. Dr. Pritchard arrived to the bedside and performed his assessment to which he requested Narcan administration. The Rapid Response RN requested to consult with the MD on Narcan administration as the patient's mental status was appropriate for the medications she received; her pain was well controlled, and she was displaying tachypnea with shortness of breath. Rapid Response RN was concerned that reversing all opioids would cause the patient more pain, further increasing her metabolic demand and tachypnea. ABG obtained with no acute critical values noted. Rapid Response RN encouraged MD to evaluate the  patient's primary cause of respiratory distress (ventilation issues, sepsis, etc.) as opioid overdose did not appear to be the primary cause of this respiratory distress. Rapid Response RN agreed that the patient would become more alert with opioid reversal, but that would not mitigate the cause of the distress. Dr. Pritchard insisted the Narcan be given and 0.4mg IVP was given by the bedside RN. The patient was fully alert post-administration, but still having difficulty ventilating efficiently. After approximately forty minutes, and no significant patient improvement noted, MD ordered Racemic Epinephrine and multiple nebulized medications. MD also adjusted antibiotic coverage, ordered blood cultures, and ordered a cardiac workup including EKG and lab work.       INTERVENTIONS    The patient was seen for Respiratory problem. Staff concerns included tachypnea, new onset of difficulty breathing, oxygen saturation < 90% despite supplemental oxygen, increased WOB, and increased oxygen requirements. The following interventions were performed: POCT arterial blood gas , portable chest x-ray, albuterol-ipratropium (DUO-NEB) 0.5-3 mg/ 3 ml nebulizer for wheezing, continued pulse ox monitoring, continued pulse ox monitoring continued, and continued cardiac monitoring continued. (See Above)    RECOMMENDATIONS    We recommend: Continuous pulse ox, frequent vitals, pain control, scheduled nebulized treatments and respiratory evaluations, repeat chest X-ray    PROVIDER ESCALATION    Orders received and case discussed with Dr. Pritchard .    Primary team arrival time: 1900    Disposition: Remain in room 1062.    FOLLOW UP    Bedside RN Sandra present throughout and updated on plan of care. Instructed to call the Rapid Response Nurse, Helen Marino RN at [phone] for additional questions or concerns.

## 2023-03-09 NOTE — CARE UPDATE
Patients stat lab draws delayed    CXR appears with bilateral pulmonary edema.     Plan   -Lasix 80mg IV x 1 ordered   -BNP is 1000s  -Creatinine is uptrending  - monitor repeat in AM after lasix   -Hgb is downtrending - received 1unit PRBC on 03/04  -send updated type & screen with AM CBC, and anemia panel labs.   -troponin is elevated - trend with next lab - no report pt with worsening symptoms or chest pain.   -Repeat ECHO      Lab Results   Component Value Date    WBC 11.97 03/08/2023    HGB 7.1 (L) 03/08/2023    HCT 22.7 (L) 03/08/2023    MCV 82 03/08/2023     03/08/2023       BNP  Recent Labs   Lab 03/08/23  2325   BNP 1,089*     Recent Labs   Lab 03/08/23  2325   TROPONINI 0.079*

## 2023-03-09 NOTE — SUBJECTIVE & OBJECTIVE
Medications:  Continuous Infusions:  Scheduled Meds:   acetaminophen  1,000 mg Oral Q8H    albuterol-ipratropium  3 mL Nebulization Q4H    amLODIPine  10 mg Oral Daily    apixaban  5 mg Oral BID    aspirin  81 mg Oral Daily    atorvastatin  40 mg Oral QHS    clopidogreL  75 mg Oral Daily    furosemide (LASIX) injection  80 mg Intravenous Once    insulin detemir U-100  12 Units Subcutaneous BID    mupirocin   Nasal BID    naloxone  0.4 mg Intravenous Once    piperacillin-tazobactam (ZOSYN) IVPB  4.5 g Intravenous Q8H    polyethylene glycol  17 g Oral Daily    vancomycin (VANCOCIN) IVPB  1,000 mg Intravenous Q24H     PRN Meds:albuterol-ipratropium, bisacodyL, dextrose 10%, dextrose 10%, dextrose, dextrose, glucagon (human recombinant), glucagon (human recombinant), insulin aspart U-100, melatonin, naloxone, ondansetron, prochlorperazine, simethicone, sodium chloride 0.9%, sodium chloride 0.9%, sodium chloride 0.9%, Pharmacy to dose Vancomycin consult **AND** vancomycin - pharmacy to dose     Objective:     Vital Signs (Most Recent):  Temp: 99.6 °F (37.6 °C) (03/09/23 0611)  Pulse: 88 (03/09/23 0448)  Resp: 18 (03/09/23 0448)  BP: 139/60 (03/09/23 0448)  SpO2: 99 % (03/09/23 0448)   Vital Signs (24h Range):  Temp:  [98.6 °F (37 °C)-101.4 °F (38.6 °C)] 99.6 °F (37.6 °C)  Pulse:  [] 88  Resp:  [12-33] 18  SpO2:  [80 %-100 %] 99 %  BP: (109-139)/(45-79) 139/60       Physical Exam  Vitals reviewed.   Constitutional:       Appearance: Normal appearance.   Cardiovascular:      Rate and Rhythm: Normal rate and regular rhythm.      Comments: R biphasic DP and PT   L monophasic DP and biphasic PT  Abdominal:      General: Abdomen is flat.      Palpations: Abdomen is soft.   Skin:     Comments: R thigh incision with dressing - not soaked, no hematoma.   L groin dressing- small portion of breakdown to center of incision   R foot s/p TMA in dressing   Neurological:      Mental Status: She is alert    Significant  Labs:  CBC:   Recent Labs   Lab 03/09/23  0430   WBC 13.03*   RBC 2.83*   HGB 7.2*   HCT 23.1*      MCV 82   MCH 25.4*   MCHC 31.2*     CMP:   Recent Labs   Lab 03/09/23  0430   *   CALCIUM 8.7   ALBUMIN 2.0*   PROT 6.5   *   K 4.6   CO2 28   CL 97   BUN 41*   CREATININE 1.5*   ALKPHOS 46*   ALT 13   AST 20   BILITOT 0.6       Significant Diagnostics:  I have reviewed all pertinent imaging results/findings within the past 24 hours.

## 2023-03-09 NOTE — PROGRESS NOTES
Pharmacokinetic Initial Assessment: IV Vancomycin    Assessment/Plan:    Initiate intravenous vancomycin with loading dose of 1500 mg once followed by a maintenance dose of vancomycin 1000 mg IV every 24 hours  Desired empiric serum trough concentration is 15 to 20 mcg/mL  Draw vancomycin trough level 60 min prior to third dose on 3/10/23 at approximately 2030  Pharmacy will continue to follow and monitor vancomycin.      Please contact pharmacy at extension [phone] with any questions regarding this assessment.     Thank you for the consult,   Joaquina Rodrigues       Patient brief summary:  Nelsy Marino is a 82 y.o. female initiated on antimicrobial therapy with IV Vancomycin for treatment of suspected sepsis    Drug Allergies:   Review of patient's allergies indicates:   Allergen Reactions    Motrin [ibuprofen] Itching       Actual Body Weight:   77.9 kg    Renal Function:   Estimated Creatinine Clearance: 36.5 mL/min (based on SCr of 1.2 mg/dL).,     Dialysis Method (if applicable):  N/A    CBC (last 72 hours):  Recent Labs   Lab Result Units 03/06/23  0420 03/06/23 2238 03/07/23  0413 03/08/23  0247   WBC K/uL 13.01* 11.00 10.50 12.26   Hemoglobin g/dL 8.1* 7.5* 7.4* 8.2*   Hematocrit % 25.0* 23.7* 24.0* 25.8*   Platelets K/uL 240 245 231 281   Gran % % 82.0* 82.0* 78.1* 80.3*   Lymph % % 9.1* 7.7* 9.9* 9.0*   Mono % % 6.5 8.1 8.9 7.0   Eosinophil % % 1.5 1.5 2.3 2.6   Basophil % % 0.2 0.1 0.1 0.2   Differential Method  Automated Automated Automated Automated       Metabolic Panel (last 72 hours):  Recent Labs   Lab Result Units 03/06/23  0420 03/07/23  0413 03/08/23  0247   Sodium mmol/L 129* 132* 132*   Potassium mmol/L 4.9 4.3 4.6   Chloride mmol/L 96 98 97   CO2 mmol/L 28 25 25   Glucose mg/dL 90 203* 221*   BUN mg/dL 29* 33* 32*   Creatinine mg/dL 1.4 1.2 1.2   Albumin g/dL 2.1* 1.8* 1.8*   Total Bilirubin mg/dL 0.8 0.5 0.6   Alkaline Phosphatase U/L 46* 54* 41*   AST U/L 32 23 23   ALT U/L 16 13 13    Magnesium mg/dL  --   --  2.2       Drug levels (last 3 results):  Recent Labs   Lab Result Units 03/06/23  0118   Vancomycin-Trough ug/mL 12.2       Microbiologic Results:  Microbiology Results (last 7 days)       Procedure Component Value Units Date/Time    Blood culture [[id number]]     Order Status: Sent Specimen: Blood from Peripheral, Lower Arm, Left     Blood culture [[id number]]     Order Status: Sent Specimen: Blood from Peripheral, Lower Arm, Right     Culture, Anaerobe [[id number]] Collected: 03/04/23 1141    Order Status: Completed Specimen: Incision site from Foot, Right Updated: 03/08/23 1026     Anaerobic Culture Culture in progress    Narrative:      3rd Metatarsal    Aerobic culture [[id number]] Collected: 03/04/23 1141    Order Status: Completed Specimen: Incision site from Foot, Right Updated: 03/07/23 0844     Aerobic Bacterial Culture No growth    Narrative:      3rd Metatarsal    Gram stain [[id number]] Collected: 03/04/23 1141    Order Status: Completed Specimen: Incision site from Foot, Right Updated: 03/04/23 1418     Gram Stain Result No WBC's      No organisms seen    Narrative:      3rd Metatarsal    Fungus culture [[id number]] Collected: 03/04/23 1141    Order Status: Sent Specimen: Incision site from Foot, Right Updated: 03/04/23 1204    AFB Culture & Smear [[id number]] Collected: 03/04/23 1150    Order Status: Sent Specimen: Incision site from Foot, Right Updated: 03/04/23 1150    Blood culture #2 **CANNOT BE ORDERED STAT** [[id number]] Collected: 02/27/23 1923    Order Status: Completed Specimen: Blood from Peripheral, Antecubital, Right Updated: 03/03/23 2103     Blood Culture, Routine No Growth after 4 days.    Blood culture #1 **CANNOT BE ORDERED STAT** [[id number]] Collected: 02/27/23 1428    Order Status: Completed Specimen: Blood from Peripheral, Antecubital, Left Updated: 03/03/23 1503     Blood Culture, Routine No Growth after 4 days.

## 2023-03-09 NOTE — PROGRESS NOTES
RT called to bedside for patient's PRN neb tx. Upon arrival, pt has O2 cannula out of nose with critically low SpO2. RT started neb and SpO2 increased to 88% on neb tx. Pt remained agitated, pulling at mask and other lines so RT called RN to bedside. After tx, SpO2  back to low 80s so RT started HFNC on 12L with improved SpO2. Vitals stable. RN to call MD to bedside to reassess pt due to pt agitation.

## 2023-03-09 NOTE — PROGRESS NOTES
[facility]  Vascular Surgery  Progress Note    Patient Name: Nelsy Marino  MRN: [id number]  Admission Date: 2/27/2023  Primary Care Provider: Juan F Garay    Subjective:     Interval History: Febrile to 101 last night. Increased shortness of breath with pulmonary edema requiring oxygen.     Post-Op Info:  Procedure(s) (LRB):  AMPUTATION, FOOT, TRANSMETATARSAL (Right)   5 Days Post-Op       Medications:  Continuous Infusions:  Scheduled Meds:   acetaminophen  1,000 mg Oral Q8H    albuterol-ipratropium  3 mL Nebulization Q4H    amLODIPine  10 mg Oral Daily    apixaban  5 mg Oral BID    aspirin  81 mg Oral Daily    atorvastatin  40 mg Oral QHS    clopidogreL  75 mg Oral Daily    furosemide (LASIX) injection  80 mg Intravenous Once    insulin detemir U-100  12 Units Subcutaneous BID    mupirocin   Nasal BID    naloxone  0.4 mg Intravenous Once    piperacillin-tazobactam (ZOSYN) IVPB  4.5 g Intravenous Q8H    polyethylene glycol  17 g Oral Daily    vancomycin (VANCOCIN) IVPB  1,000 mg Intravenous Q24H     PRN Meds:albuterol-ipratropium, bisacodyL, dextrose 10%, dextrose 10%, dextrose, dextrose, glucagon (human recombinant), glucagon (human recombinant), insulin aspart U-100, melatonin, naloxone, ondansetron, prochlorperazine, simethicone, sodium chloride 0.9%, sodium chloride 0.9%, sodium chloride 0.9%, Pharmacy to dose Vancomycin consult **AND** vancomycin - pharmacy to dose     Objective:     Vital Signs (Most Recent):  Temp: 99.6 °F (37.6 °C) (03/09/23 0611)  Pulse: 88 (03/09/23 0448)  Resp: 18 (03/09/23 0448)  BP: 139/60 (03/09/23 0448)  SpO2: 99 % (03/09/23 0448)   Vital Signs (24h Range):  Temp:  [98.6 °F (37 °C)-101.4 °F (38.6 °C)] 99.6 °F (37.6 °C)  Pulse:  [] 88  Resp:  [12-33] 18  SpO2:  [80 %-100 %] 99 %  BP: (109-139)/(45-79) 139/60       Physical Exam  Vitals reviewed.   Constitutional:       Appearance: Normal appearance.   Cardiovascular:      Rate and Rhythm: Normal rate  and regular rhythm.      Comments: R biphasic DP and PT   L monophasic DP and biphasic PT  Abdominal:      General: Abdomen is flat.      Palpations: Abdomen is soft.   Skin:     Comments: R thigh incision with dressing - not soaked, no hematoma.   L groin dressing- small portion of breakdown to center of incision   R foot s/p TMA in dressing   Neurological:      Mental Status: She is alert    Significant Labs:  CBC:   Recent Labs   Lab 03/09/23  0430   WBC 13.03*   RBC 2.83*   HGB 7.2*   HCT 23.1*      MCV 82   MCH 25.4*   MCHC 31.2*     CMP:   Recent Labs   Lab 03/09/23  0430   *   CALCIUM 8.7   ALBUMIN 2.0*   PROT 6.5   *   K 4.6   CO2 28   CL 97   BUN 41*   CREATININE 1.5*   ALKPHOS 46*   ALT 13   AST 20   BILITOT 0.6       Significant Diagnostics:  I have reviewed all pertinent imaging results/findings within the past 24 hours.    Assessment/Plan:     * Critical limb ischemia with history of revascularization of same extremity  Nelsy Marino is a 82 y.o. lady with severe PAD    - s/p TMA 3/4  - recommend UA and completion of fever workup, blood cultures  - please have a low threshold to escalate antibiotics. If her graft becomes infected and requires antibiotics, she has no other options left for revascularizations  - Q4H neurovasc checks  - Continue diet  - antibiotics per medicine  - ASA/plavix/Statin/Eliquis   - PT/OT  - Podiatry following  - remainder of care per primary    Dispo: SNF  Follow up in two weeks with Bettina, ARCHIE/TBI, US Graft        Gilbert Gomez MD  Vascular Surgery  [facility]

## 2023-03-09 NOTE — SIGNIFICANT EVENT
This evening rapid response called    Primary Dr. Pritchard notified me of events and was at bedside.     Patient with rise in O2 requirement from 2L to 12L high flow nasal cannula,     On my arrival pt had received 0.4mg IV Narcan with improvement in mental status     ABG reported done - results not available for review in epic - rapid team/ apprised of the results.     Pt weaned to 4L, multiple family members at bedside.     Temp was 100 around 1500 and repeat temp is 101-102    Also after nursing staff left room.  I asked family to withhold feeding patient tray.   Family states she has not eaten all day.  Chest xray is pending    Plan  -obtain chest xray   -Obtain blood cultures, lactic acid, repeat CBC/CMP, check BNP/troponin  -EKG is pending also   -she is on Zosyn.  D/c keflex not needed while on zosyn   -d/c prn opiate orders for now  - restart when needed  -When patient is sleeping/resting she has some abdominal breathing and wheezing noted, there does seem to be upper airway wheeze, possible stridor  - STAT racemic epinephrine   -Start on continuous pulse ox  -Pending CXR appearance likely will need sepsis bolus fluids  -Start empiric Vancomcyin given new fever        Stu Hernandez M.D.  Attending Physician  [facility] Medicine Dept.  Pager: [phone]  Spectralink -x [phone]

## 2023-03-09 NOTE — PLAN OF CARE
03/09/23 1108   Post-Acute Status   Post-Acute Authorization Placement   Post-Acute Placement Status Referrals Sent   Coverage [facility] Resources/Appts/Education Provided Provided patient/caregiver with written discharge plan information   Patient choice form signed by patient/caregiver List with quality metrics by geographic area provided   Discharge Delays None known at this time   Discharge Plan   Discharge Plan A Long-term acute care facility (LTAC)   Discharge Plan B Skilled Nursing Facility     Per mgt, SW sent referrals to local LTAC's.     Edwige Gan LCSW  Case Management/[facility]  [phone]

## 2023-03-09 NOTE — PT/OT/SLP PROGRESS
Physical Therapy Treatment    Patient Name:  Nelsy Marino   MRN:  [id number]    Recommendations:     Discharge Recommendations: nursing facility, skilled  Discharge Equipment Recommendations:  (TBD)  Barriers to discharge: Inaccessible home and Decreased caregiver support    Assessment:     Nelsy Marino is a 82 y.o. female admitted with a medical diagnosis of Critical limb ischemia with history of revascularization of same extremity.  She presents with the following impairments/functional limitations: impaired endurance, weakness, impaired functional mobility, impaired self care skills, gait instability, impaired balance, decreased coordination, decreased lower extremity function, pain, decreased safety awareness. Pt required Mod A with bed mobility, Min A x2 with RW for t/fs, and took 4 steps to bedside chair with Min A x2 using RW. Pt will continue to benefit from skilled PT services to improve all deficits noted above. Resume PT POC as indicated.     Rehab Prognosis: Good; patient would benefit from acute skilled PT services to address these deficits and reach maximum level of function.    Recent Surgery: Procedure(s) (LRB):  AMPUTATION, FOOT, TRANSMETATARSAL (Right) 5 Days Post-Op    Plan:     During this hospitalization, patient to be seen 4 x/week to address the identified rehab impairments via gait training, therapeutic activities, therapeutic exercises, neuromuscular re-education and progress toward the following goals:    Plan of Care Expires:  04/04/23    Subjective     Chief Complaint: none stated  Patient/Family Comments/goals: none stated  Pain/Comfort:  Pain Rating 1: 0/10  Pain Rating Post-Intervention 1: 0/10      Objective:     Communicated with nursing prior to session.  Patient found HOB elevated with  (all lines intact, and family members present) upon PT entry to room.     General Precautions: Standard, fall  Orthopedic Precautions: RLE weight bearing as tolerated  Braces:  (DARCO  shoe)  Respiratory Status: Room air     Functional Mobility:  Bed Mobility:  Scooting: contact guard assistance  Supine to Sit: moderate assistance  Transfers:  Sit to Stand:  minimum assistance and of 2 persons with rolling walker  Bed to Chair: minimum assistance and of 2 persons with  rolling walker  using  Step Transfer  Gait: Pt took 4 steps to bedside chair with Min A x2 using RW.   Balance: Pt sat EOB with SBA prior to t/f to chair.       AM-PAC 6 CLICK MOBILITY  Turning over in bed (including adjusting bedclothes, sheets and blankets)?: 3  Sitting down on and standing up from a chair with arms (e.g., wheelchair, bedside commode, etc.): 2  Moving from lying on back to sitting on the side of the bed?: 2  Moving to and from a bed to a chair (including a wheelchair)?: 2  Need to walk in hospital room?: 2  Climbing 3-5 steps with a railing?: 1  Basic Mobility Total Score: 12       Treatment & Education:  -BLE therex x10 reps   -All questions/concerns answered within PTA scope of practice.     Patient left up in chair (BLE elevated) with all lines intact, call button in reach, nursing notified, and family  present..    GOALS:   Multidisciplinary Problems       Physical Therapy Goals          Problem: Physical Therapy    Goal Priority Disciplines Outcome Goal Variances Interventions   Physical Therapy Goal     PT, PT/OT Ongoing, Progressing     Description: Goals to be met by: 3/20     Patient will increase functional independence with mobility by performin. Supine to sit with Stand-by Assistance  2. Rolling to Left and Right with Stand-by Assistance.  3. Sit to stand transfer with Contact Guard Assistance using Rolling Walker  4. Gait  x 50 feet with Contact Guard Assistance using Rolling Walker.   5. Ascend/descend 1 stair with bilateral Handrails Contact Guard Assistance using Rolling Walker.                          Time Tracking:     PT Received On: 23  PT Start Time: 1010     PT Stop Time:  1038  PT Total Time (min): 28 min     Billable Minutes: Therapeutic Activity 28    Treatment Type: Treatment  PT/PTA: PTA     PTA Visit Number: 2     03/09/2023

## 2023-03-09 NOTE — ASSESSMENT & PLAN NOTE
Nelsy Marino is a 82 y.o. lady with severe PAD    - s/p TMA 3/4  - recommend UA and completion of fever workup, blood cultures  - please have a low threshold to escalate antibiotics. If her graft becomes infected and requires antibiotics, she has no other options left for revascularizations  - Q4H neurovasc checks  - Continue diet  - antibiotics per medicine  - ASA/plavix/Statin/Eliquis   - PT/OT  - Podiatry following  - remainder of care per primary    Dispo: SNF  Follow up in two weeks with Bettina, ARCHIE/TBI, US Graft

## 2023-03-09 NOTE — CONSULTS
Vancomycin order and consult has been discontinued. Pharmacy will sign off. Please re-consult if needed.     Thanks  Sera LevineD  Clinical [facility] Pharmacist   Ext [phone]

## 2023-03-09 NOTE — PT/OT/SLP PROGRESS
Occupational Therapy   Treatment    Name: Nelsy Marino  MRN: [id number]  Admitting Diagnosis:  Critical limb ischemia with history of revascularization of same extremity  5 Days Post-Op    Recommendations:     Discharge Recommendations: nursing facility, skilled  Discharge Equipment Recommendations:  wheelchair, lift device, hospital bed  Barriers to discharge:       Assessment:     Nelsy Marino is a 82 y.o. female with a medical diagnosis of Critical limb ischemia with history of revascularization of same extremity. Performance deficits affecting function are weakness, impaired endurance, impaired self care skills, impaired functional mobility, gait instability, impaired balance, decreased coordination.     Rehab Prognosis:  Good; patient would benefit from acute skilled OT services to address these deficits and reach maximum level of function.       Plan:     Patient to be seen 3 x/week to address the above listed problems via self-care/home management, therapeutic activities, therapeutic exercises, neuromuscular re-education  Plan of Care Expires: 04/05/23  Plan of Care Reviewed with: patient, family    Subjective     Pain/Comfort:  Pain Rating 1: 0/10    Objective:     Communicated with: rn prior to session.  Patient found supine with oxygen, peripheral IV upon OT entry to room.    General Precautions: Standard, fall    Orthopedic Precautions:RLE weight bearing as tolerated (with Darco shoe.)  Braces:  (darco)  Respiratory Status: Nasal cannula, flow 7 L/min     Occupational Performance:     Bed Mobility:    Patient completed Scooting/Bridging with contact guard assistance  Patient completed Supine to Sit with moderate assistance     Functional Mobility/Transfers:  Patient completed Sit <> Stand Transfer with moderate assistance and of 2 persons  with  rolling walker   Patient completed Bed <> Chair Transfer using Step Transfer technique with minimum assistance and of 2 persons with rolling walker  Functional  Mobility: Pt took 3 steps from the bed>chair with Min A x 2 with a RW.    Activities of Daily Living:  Lower Body Dressing: total assistance    AMPAC 6 Click ADL: 18    Treatment & Education:  From chair level, pt completed BUE therex (x 10 reps each) including:  - elbow flexion/extension  - straight arm raises  - lat rows    Discussed OT POC and progress.    Patient left up in chair with all lines intact and call button in reach    GOALS:   Multidisciplinary Problems       Occupational Therapy Goals          Problem: Occupational Therapy    Goal Priority Disciplines Outcome Interventions   Occupational Therapy Goal     OT, PT/OT Ongoing, Progressing    Description: Goals to be met by: 3/20/23     Patient will increase functional independence with ADLs by performing:    Feeding with [place].  UE Dressing with [place].  LE Dressing with Supervision.  Grooming while standing at sink with Stand-by Assistance.  Toileting from toilet with Stand-by Assistance for hygiene and clothing management.   Toilet transfer to toilet with Stand-by Assistance.                         Time Tracking:     OT Date of Treatment: 03/09/23  OT Start Time: 1013  OT Stop Time: 1038  OT Total Time (min): 25 min    Billable Minutes:Therapeutic Activity 13  Therapeutic Exercise 12    OT/GEORGE: OT          3/9/2023

## 2023-03-09 NOTE — PLAN OF CARE
See nursing progress note for shift activity.    Problem: Adult Inpatient Plan of Care  Goal: Plan of Care Review  Outcome: Ongoing, Progressing  Goal: Patient-Specific Goal (Individualized)  Outcome: Ongoing, Progressing  Goal: Absence of Hospital-Acquired Illness or Injury  Outcome: Ongoing, Progressing  Goal: Optimal Comfort and Wellbeing  Outcome: Ongoing, Progressing     Problem: Diabetes Comorbidity  Goal: Blood Glucose Level Within Targeted Range  Outcome: Ongoing, Progressing     Problem: Adjustment to Illness (Sepsis/Septic Shock)  Goal: Optimal Coping  Outcome: Ongoing, Progressing     Problem: Bleeding (Sepsis/Septic Shock)  Goal: Absence of Bleeding  Outcome: Ongoing, Progressing     Problem: Glycemic Control Impaired (Sepsis/Septic Shock)  Goal: Blood Glucose Level Within Desired Range  Outcome: Ongoing, Progressing     Problem: Infection Progression (Sepsis/Septic Shock)  Goal: Absence of Infection Signs and Symptoms  Outcome: Ongoing, Progressing     Problem: Nutrition Impaired (Sepsis/Septic Shock)  Goal: Optimal Nutrition Intake  Outcome: Ongoing, Progressing     Problem: Fall Injury Risk  Goal: Absence of Fall and Fall-Related Injury  Outcome: Ongoing, Progressing     Problem: Skin Injury Risk Increased  Goal: Skin Health and Integrity  Outcome: Ongoing, Progressing     Problem: Infection  Goal: Absence of Infection Signs and Symptoms  Outcome: Ongoing, Progressing

## 2023-03-09 NOTE — SUBJECTIVE & OBJECTIVE
Interval History: Patient sitting in chair, no acute distress. Family at bedside. Overnight, patient had increased oxygen requirement which increased from 2 L to 12 L NC. CXR showed pulmonary edema and possible infectious pneumonitis. Patient was started on IV lasix and zosyn with oxygen requirement decreasing to 5 L NC today. Patient reports SOB improving while on diuresis. She does note right foot pain that is poorly controlled with scheduled tylenol. Also mentions a cough that started during this admission. Patient alert and oriented x3 but family notes that she has moments of confusion which is likely related to delirium and sleep disturbances. Delirium precautions ordered and patient started on low dose tramadol prn. Patient also notes good UOP, regular bowel movements and good po intake. Denies fever, chills, chest pain, N/V, abdominal pain. Patient does note some weakness and is working well with therapy and anticipate discharge to SNF when medically stable.      Review of Systems   Constitutional:  Positive for activity change and fatigue. Negative for chills and fever.   HENT:  Negative for congestion.    Eyes:  Negative for visual disturbance.   Respiratory:  Positive for cough and shortness of breath.    Cardiovascular:  Positive for leg swelling. Negative for chest pain.   Gastrointestinal:  Negative for abdominal distention, abdominal pain, nausea and vomiting.   Genitourinary:  Negative for dysuria.   Musculoskeletal:  Positive for arthralgias (right foot) and myalgias (right foot). Negative for back pain.   Skin:  Negative for rash and wound.   Neurological:  Positive for weakness. Negative for dizziness.   Psychiatric/Behavioral:  Negative for confusion.    Objective:     Vital Signs (Most Recent):  Temp: 99.1 °F (37.3 °C) (03/09/23 1540)  Pulse: 91 (03/09/23 1540)  Resp: 18 (03/09/23 1540)  BP: (!) 125/58 (03/09/23 1540)  SpO2: 97 % (03/09/23 1540)   Vital Signs (24h Range):  Temp:  [98.6 °F (37  °C)-101.4 °F (38.6 °C)] 99.1 °F (37.3 °C)  Pulse:  [] 91  Resp:  [15-22] 18  SpO2:  [88 %-100 %] 97 %  BP: (108-139)/(53-60) 125/58     Weight: 77.6 kg (171 lb)  Body mass index is 29.35 kg/m².    Intake/Output Summary (Last 24 hours) at 3/9/2023 1647  Last data filed at 3/9/2023 1432  Gross per 24 hour   Intake 1423 ml   Output 2525 ml   Net -1102 ml      Physical Exam  Constitutional:       Appearance: Normal appearance.   HENT:      Head: Normocephalic.      Mouth/Throat:      Mouth: Mucous membranes are moist.   Eyes:      Extraocular Movements: Extraocular movements intact.      Pupils: Pupils are equal, round, and reactive to light.   Neck:      Comments: JVD absent  Cardiovascular:      Rate and Rhythm: Normal rate and regular rhythm.      Pulses: Normal pulses.      Heart sounds: Normal heart sounds.   Pulmonary:      Effort: Respiratory distress present.      Breath sounds: Normal breath sounds.   Abdominal:      General: Bowel sounds are normal. There is no distension.      Palpations: Abdomen is soft.      Tenderness: There is no abdominal tenderness.   Musculoskeletal:         General: Normal range of motion.      Cervical back: Neck supple.      Right lower leg: Edema present.      Left lower leg: Edema present.      Comments: Right TMA with dressing c/d/I and surgical shoe in place   Neurological:      General: No focal deficit present.      Mental Status: She is alert.   Psychiatric:         Mood and Affect: Mood normal.         Cognition and Memory: Cognition is impaired. Memory is impaired.       Significant Labs: A1C:   Recent Labs   Lab 02/28/23  0549   HGBA1C 8.2*     Blood Culture:   Recent Labs   Lab 03/08/23 2325   LABBLOO No Growth to date  No Growth to date     CBC:   Recent Labs   Lab 03/08/23  0247 03/08/23 2325 03/09/23  0430   WBC 12.26 11.97 13.03*   HGB 8.2* 7.1* 7.2*   HCT 25.8* 22.7* 23.1*    328 347     CMP:   Recent Labs   Lab 03/08/23 0247 03/08/23 2325  03/09/23  0430   * 132* 133*   K 4.6 4.5 4.6   CL 97 98 97   CO2 25 25 28   * 256* 308*   BUN 32* 40* 41*   CREATININE 1.2 1.5* 1.5*   CALCIUM 8.2* 8.6* 8.7   PROT 6.4  --  6.5   ALBUMIN 1.8*  --  2.0*   BILITOT 0.6  --  0.6   ALKPHOS 41*  --  46*   AST 23  --  20   ALT 13  --  13   ANIONGAP 10 9 8     Coagulation:   Recent Labs   Lab 03/09/23  0430   APTT 37.3*     Lactic Acid:   Recent Labs   Lab 03/08/23  2325 03/09/23  0430   LACTATE 1.0 1.1     Magnesium:   Recent Labs   Lab 03/08/23  0247 03/09/23  0430   MG 2.2 2.1     POCT Glucose:   Recent Labs   Lab 03/09/23  0735 03/09/23  1151 03/09/23  1540   POCTGLUCOSE 294* 325* 291*     Troponin:   Recent Labs   Lab 03/08/23  2325 03/09/23  0430   TROPONINI 0.079* 0.067*     TSH: No results for input(s): TSH in the last 4320 hours.  Urine Studies:   Recent Labs   Lab 03/09/23  1427   COLORU Yellow   APPEARANCEUA Clear   PHUR 5.0   SPECGRAV 1.015   PROTEINUA Negative   GLUCUA Negative   KETONESU Negative   BILIRUBINUA Negative   OCCULTUA Negative   NITRITE Negative   LEUKOCYTESUR Negative       Significant Imaging: I have reviewed all pertinent imaging results/findings within the past 24 hours.   no anemia, no easy bruising, no jaundice, no swollen lymph nodes.

## 2023-03-09 NOTE — NURSING
Per rapid team, 2200 tylenol dose given early due to documented fever. Secure message sent requesting cath in /n/out order to retrieve stat UA

## 2023-03-09 NOTE — ASSESSMENT & PLAN NOTE
- on chronic suppression with keflex  - escalated to zosyn due to increased oxygen requirement to cover to possible PNA

## 2023-03-09 NOTE — ASSESSMENT & PLAN NOTE
Patient with Hypercapnic and Hypoxic Respiratory failure which is Acute on chronic.  she is not on home oxygen. Supplemental oxygen was provided and noted-  .   Signs/symptoms of respiratory failure include- tachypnea, increased work of breathing and lethargy. Contributing diagnoses includes - CHF, Obesity Hypoventilation and Pneumonia Labs and images were reviewed. Patient Has recent ABG, which has been reviewed. Will treat underlying causes and adjust management of respiratory failure.    PLAN:  - increased from 2 L NC to 12 L NC on 3/78  - now weaned to 5 L NC  - CXR showed pulmonary edema with no consolidation  - switched to xoponex due to tremors on duonebs  - incentive spirometry   - mucinex and prn tessalon perles  - chest PT and acapella   - OOB to chair  - IMPROVING

## 2023-03-09 NOTE — CARE UPDATE
Called to the bedside by nursing as the patient was noted to become gradually somnolent and hypoxic with increasing oxygen requirement since she had arrived to the medical floor from the SICU sometime after she got Percocet and Dilaudid.    At bedside evaluation patient was noted to be wheezing and somnolent but protecting her airway, on nasal cannula, no cyanosis, on 12 L with some abdominal retractions.  Painful vigorous sternal rub successfully got the patient to awaken when she looked around for a few minutes confused and anxious.  Patient was then) are eyes and does off.  She was able to move all her extremities successfully, demonstrated fist  5/5 and moves both lower extremities well.  We were able to rapidly weaned down O2 after sternal rubbing the patient getting her to sit up elevating the head of the bed.    Narcan dose ordered with significant improvement mental status and improvement in O2 sats with decreasing O2 requirement.   and brother at the bedside and they affirmed that the patient was back to herself. .    Patient had equal pupils bilaterally   No stroke-like deficits were noted.  I do not feel the need for head CT at this time.  She had some intermittent wheezing which resolved after Narcan dose was given and the patient was sitting up better.    However patient was noted to have a temp of up to 102 and she did feel physically warm.  Concern for sepsis.  Patient's blood pressure is robust.  ABG showed no significant CO2 retention, pH around 7.4.    Case handed off to gema, appreciate assistance as always.

## 2023-03-10 LAB
ALBUMIN SERPL BCP-MCNC: 2 G/DL (ref 3.5–5.2)
ALP SERPL-CCNC: 64 U/L (ref 55–135)
ALT SERPL W/O P-5'-P-CCNC: 11 U/L (ref 10–44)
ANION GAP SERPL CALC-SCNC: 8 MMOL/L (ref 8–16)
APTT BLDCRRT: 37.6 SEC (ref 21–32)
AST SERPL-CCNC: 17 U/L (ref 10–40)
BASOPHILS # BLD AUTO: 0.02 K/UL (ref 0–0.2)
BASOPHILS NFR BLD: 0.1 % (ref 0–1.9)
BILIRUB SERPL-MCNC: 0.6 MG/DL (ref 0.1–1)
BNP SERPL-MCNC: 1155 PG/ML (ref 0–99)
BUN SERPL-MCNC: 46 MG/DL (ref 8–23)
CALCIUM SERPL-MCNC: 8.8 MG/DL (ref 8.7–10.5)
CHLORIDE SERPL-SCNC: 99 MMOL/L (ref 95–110)
CO2 SERPL-SCNC: 29 MMOL/L (ref 23–29)
CREAT SERPL-MCNC: 1.4 MG/DL (ref 0.5–1.4)
CRP SERPL-MCNC: 200 MG/L (ref 0–8.2)
DIFFERENTIAL METHOD: ABNORMAL
EOSINOPHIL # BLD AUTO: 0.1 K/UL (ref 0–0.5)
EOSINOPHIL NFR BLD: 0.8 % (ref 0–8)
ERYTHROCYTE [DISTWIDTH] IN BLOOD BY AUTOMATED COUNT: 15 % (ref 11.5–14.5)
ERYTHROCYTE [SEDIMENTATION RATE] IN BLOOD BY PHOTOMETRIC METHOD: 64 MM/HR (ref 0–36)
EST. GFR  (NO RACE VARIABLE): 37.6 ML/MIN/1.73 M^2
GLUCOSE SERPL-MCNC: 184 MG/DL (ref 70–110)
GRAM STN SPEC: NORMAL
GRAM STN SPEC: NORMAL
HCT VFR BLD AUTO: 20.6 % (ref 37–48.5)
HGB BLD-MCNC: 6.4 G/DL (ref 12–16)
IMM GRANULOCYTES # BLD AUTO: 0.17 K/UL (ref 0–0.04)
IMM GRANULOCYTES NFR BLD AUTO: 1.1 % (ref 0–0.5)
LYMPHOCYTES # BLD AUTO: 1.2 K/UL (ref 1–4.8)
LYMPHOCYTES NFR BLD: 7.4 % (ref 18–48)
MAGNESIUM SERPL-MCNC: 2.2 MG/DL (ref 1.6–2.6)
MCH RBC QN AUTO: 25.3 PG (ref 27–31)
MCHC RBC AUTO-ENTMCNC: 31.1 G/DL (ref 32–36)
MCV RBC AUTO: 81 FL (ref 82–98)
MONOCYTES # BLD AUTO: 1.2 K/UL (ref 0.3–1)
MONOCYTES NFR BLD: 7.7 % (ref 4–15)
NEUTROPHILS # BLD AUTO: 12.8 K/UL (ref 1.8–7.7)
NEUTROPHILS NFR BLD: 82.9 % (ref 38–73)
NRBC BLD-RTO: 0 /100 WBC
PHOSPHATE SERPL-MCNC: 3.4 MG/DL (ref 2.7–4.5)
PLATELET # BLD AUTO: 359 K/UL (ref 150–450)
PMV BLD AUTO: 10.4 FL (ref 9.2–12.9)
POCT GLUCOSE: 193 MG/DL (ref 70–110)
POCT GLUCOSE: 194 MG/DL (ref 70–110)
POCT GLUCOSE: 200 MG/DL (ref 70–110)
POCT GLUCOSE: 210 MG/DL (ref 70–110)
POCT GLUCOSE: 263 MG/DL (ref 70–110)
POTASSIUM SERPL-SCNC: 3.9 MMOL/L (ref 3.5–5.1)
PROCALCITONIN SERPL IA-MCNC: 0.65 NG/ML
PROT SERPL-MCNC: 6.7 G/DL (ref 6–8.4)
RBC # BLD AUTO: 2.53 M/UL (ref 4–5.4)
SODIUM SERPL-SCNC: 136 MMOL/L (ref 136–145)
WBC # BLD AUTO: 15.46 K/UL (ref 3.9–12.7)

## 2023-03-10 PROCEDURE — 99233 SBSQ HOSP IP/OBS HIGH 50: CPT | Mod: ,,, | Performed by: INTERNAL MEDICINE

## 2023-03-10 PROCEDURE — 83735 ASSAY OF MAGNESIUM: CPT | Performed by: HOSPITALIST

## 2023-03-10 PROCEDURE — 86140 C-REACTIVE PROTEIN: CPT | Performed by: INTERNAL MEDICINE

## 2023-03-10 PROCEDURE — 25000003 PHARM REV CODE 250: Performed by: SURGERY

## 2023-03-10 PROCEDURE — 87070 CULTURE OTHR SPECIMN AEROBIC: CPT

## 2023-03-10 PROCEDURE — 25000003 PHARM REV CODE 250: Performed by: INTERNAL MEDICINE

## 2023-03-10 PROCEDURE — 94761 N-INVAS EAR/PLS OXIMETRY MLT: CPT

## 2023-03-10 PROCEDURE — 97110 THERAPEUTIC EXERCISES: CPT

## 2023-03-10 PROCEDURE — 99900035 HC TECH TIME PER 15 MIN (STAT)

## 2023-03-10 PROCEDURE — 87186 SC STD MICRODIL/AGAR DIL: CPT

## 2023-03-10 PROCEDURE — 87077 CULTURE AEROBIC IDENTIFY: CPT

## 2023-03-10 PROCEDURE — 25000003 PHARM REV CODE 250: Performed by: STUDENT IN AN ORGANIZED HEALTH CARE EDUCATION/TRAINING PROGRAM

## 2023-03-10 PROCEDURE — 94799 UNLISTED PULMONARY SVC/PX: CPT

## 2023-03-10 PROCEDURE — 87040 BLOOD CULTURE FOR BACTERIA: CPT | Performed by: INTERNAL MEDICINE

## 2023-03-10 PROCEDURE — 99233 PR SUBSEQUENT HOSPITAL CARE,LEVL III: ICD-10-PCS | Mod: ,,, | Performed by: INTERNAL MEDICINE

## 2023-03-10 PROCEDURE — 27000221 HC OXYGEN, UP TO 24 HOURS

## 2023-03-10 PROCEDURE — 85652 RBC SED RATE AUTOMATED: CPT | Performed by: INTERNAL MEDICINE

## 2023-03-10 PROCEDURE — 85730 THROMBOPLASTIN TIME PARTIAL: CPT | Performed by: STUDENT IN AN ORGANIZED HEALTH CARE EDUCATION/TRAINING PROGRAM

## 2023-03-10 PROCEDURE — 27000207 HC ISOLATION

## 2023-03-10 PROCEDURE — 97530 THERAPEUTIC ACTIVITIES: CPT | Mod: CQ

## 2023-03-10 PROCEDURE — 94664 DEMO&/EVAL PT USE INHALER: CPT

## 2023-03-10 PROCEDURE — 25000003 PHARM REV CODE 250

## 2023-03-10 PROCEDURE — 36415 COLL VENOUS BLD VENIPUNCTURE: CPT | Performed by: INTERNAL MEDICINE

## 2023-03-10 PROCEDURE — 99900031 HC PATIENT EDUCATION (STAT)

## 2023-03-10 PROCEDURE — 83880 ASSAY OF NATRIURETIC PEPTIDE: CPT | Performed by: INTERNAL MEDICINE

## 2023-03-10 PROCEDURE — 20600001 HC STEP DOWN PRIVATE ROOM

## 2023-03-10 PROCEDURE — 99222 PR INITIAL HOSPITAL CARE,LEVL II: ICD-10-PCS | Mod: GC,,, | Performed by: GENERAL ACUTE CARE HOSPITAL

## 2023-03-10 PROCEDURE — 25000242 PHARM REV CODE 250 ALT 637 W/ HCPCS: Performed by: INTERNAL MEDICINE

## 2023-03-10 PROCEDURE — 63600175 PHARM REV CODE 636 W HCPCS: Performed by: NURSE PRACTITIONER

## 2023-03-10 PROCEDURE — 97530 THERAPEUTIC ACTIVITIES: CPT

## 2023-03-10 PROCEDURE — 99222 1ST HOSP IP/OBS MODERATE 55: CPT | Mod: GC,,, | Performed by: GENERAL ACUTE CARE HOSPITAL

## 2023-03-10 PROCEDURE — 63600175 PHARM REV CODE 636 W HCPCS: Performed by: INTERNAL MEDICINE

## 2023-03-10 PROCEDURE — 36415 COLL VENOUS BLD VENIPUNCTURE: CPT | Performed by: STUDENT IN AN ORGANIZED HEALTH CARE EDUCATION/TRAINING PROGRAM

## 2023-03-10 PROCEDURE — 84145 PROCALCITONIN (PCT): CPT | Performed by: INTERNAL MEDICINE

## 2023-03-10 PROCEDURE — 80053 COMPREHEN METABOLIC PANEL: CPT | Performed by: STUDENT IN AN ORGANIZED HEALTH CARE EDUCATION/TRAINING PROGRAM

## 2023-03-10 PROCEDURE — 87205 SMEAR GRAM STAIN: CPT

## 2023-03-10 PROCEDURE — 87075 CULTR BACTERIA EXCEPT BLOOD: CPT

## 2023-03-10 PROCEDURE — 84100 ASSAY OF PHOSPHORUS: CPT | Performed by: HOSPITALIST

## 2023-03-10 PROCEDURE — 27000646 HC AEROBIKA DEVICE

## 2023-03-10 PROCEDURE — 94640 AIRWAY INHALATION TREATMENT: CPT

## 2023-03-10 PROCEDURE — 85025 COMPLETE CBC W/AUTO DIFF WBC: CPT | Performed by: STUDENT IN AN ORGANIZED HEALTH CARE EDUCATION/TRAINING PROGRAM

## 2023-03-10 RX ORDER — INSULIN ASPART 100 [IU]/ML
10 INJECTION, SOLUTION INTRAVENOUS; SUBCUTANEOUS
Status: DISCONTINUED | OUTPATIENT
Start: 2023-03-10 | End: 2023-03-10

## 2023-03-10 RX ORDER — FUROSEMIDE 10 MG/ML
60 INJECTION INTRAMUSCULAR; INTRAVENOUS 2 TIMES DAILY
Status: DISCONTINUED | OUTPATIENT
Start: 2023-03-10 | End: 2023-03-11

## 2023-03-10 RX ORDER — INSULIN ASPART 100 [IU]/ML
1-10 INJECTION, SOLUTION INTRAVENOUS; SUBCUTANEOUS
Status: DISCONTINUED | OUTPATIENT
Start: 2023-03-10 | End: 2023-03-11

## 2023-03-10 RX ORDER — INSULIN ASPART 100 [IU]/ML
7 INJECTION, SOLUTION INTRAVENOUS; SUBCUTANEOUS
Status: DISCONTINUED | OUTPATIENT
Start: 2023-03-10 | End: 2023-03-13

## 2023-03-10 RX ORDER — TRAMADOL HYDROCHLORIDE 50 MG/1
50 TABLET ORAL EVERY 6 HOURS PRN
Status: DISCONTINUED | OUTPATIENT
Start: 2023-03-10 | End: 2023-03-20 | Stop reason: HOSPADM

## 2023-03-10 RX ORDER — INSULIN ASPART 100 [IU]/ML
7 INJECTION, SOLUTION INTRAVENOUS; SUBCUTANEOUS
Status: DISCONTINUED | OUTPATIENT
Start: 2023-03-10 | End: 2023-03-10

## 2023-03-10 RX ADMIN — INSULIN ASPART 6 UNITS: 100 INJECTION, SOLUTION INTRAVENOUS; SUBCUTANEOUS at 04:03

## 2023-03-10 RX ADMIN — ASPIRIN 81 MG: 81 TABLET, COATED ORAL at 08:03

## 2023-03-10 RX ADMIN — AMLODIPINE BESYLATE 10 MG: 10 TABLET ORAL at 08:03

## 2023-03-10 RX ADMIN — APIXABAN 5 MG: 5 TABLET, FILM COATED ORAL at 08:03

## 2023-03-10 RX ADMIN — CLOPIDOGREL BISULFATE 75 MG: 75 TABLET ORAL at 08:03

## 2023-03-10 RX ADMIN — ACETAMINOPHEN 1000 MG: 500 TABLET ORAL at 09:03

## 2023-03-10 RX ADMIN — INSULIN ASPART 2 UNITS: 100 INJECTION, SOLUTION INTRAVENOUS; SUBCUTANEOUS at 11:03

## 2023-03-10 RX ADMIN — ACETAMINOPHEN 1000 MG: 500 TABLET ORAL at 06:03

## 2023-03-10 RX ADMIN — GUAIFENESIN AND DEXTROMETHORPHAN HYDROBROMIDE 1 TABLET: 600; 30 TABLET, EXTENDED RELEASE ORAL at 08:03

## 2023-03-10 RX ADMIN — Medication 6 MG: at 09:03

## 2023-03-10 RX ADMIN — APIXABAN 5 MG: 5 TABLET, FILM COATED ORAL at 09:03

## 2023-03-10 RX ADMIN — FUROSEMIDE 40 MG: 10 INJECTION, SOLUTION INTRAMUSCULAR; INTRAVENOUS at 08:03

## 2023-03-10 RX ADMIN — MUPIROCIN: 20 OINTMENT TOPICAL at 08:03

## 2023-03-10 RX ADMIN — MUPIROCIN: 20 OINTMENT TOPICAL at 10:03

## 2023-03-10 RX ADMIN — TRAMADOL HYDROCHLORIDE 50 MG: 50 TABLET, COATED ORAL at 09:03

## 2023-03-10 RX ADMIN — LEVALBUTEROL 1.25 MG: 1.25 SOLUTION, CONCENTRATE RESPIRATORY (INHALATION) at 08:03

## 2023-03-10 RX ADMIN — FUROSEMIDE 60 MG: 10 INJECTION, SOLUTION INTRAMUSCULAR; INTRAVENOUS at 04:03

## 2023-03-10 RX ADMIN — TRAMADOL HYDROCHLORIDE 25 MG: 50 TABLET, COATED ORAL at 02:03

## 2023-03-10 RX ADMIN — INSULIN DETEMIR 20 UNITS: 100 INJECTION, SOLUTION SUBCUTANEOUS at 10:03

## 2023-03-10 RX ADMIN — PIPERACILLIN AND TAZOBACTAM 4.5 G: 4; .5 INJECTION, POWDER, LYOPHILIZED, FOR SOLUTION INTRAVENOUS; PARENTERAL at 11:03

## 2023-03-10 RX ADMIN — LEVALBUTEROL 1.25 MG: 1.25 SOLUTION, CONCENTRATE RESPIRATORY (INHALATION) at 02:03

## 2023-03-10 RX ADMIN — INSULIN ASPART 7 UNITS: 100 INJECTION, SOLUTION INTRAVENOUS; SUBCUTANEOUS at 04:03

## 2023-03-10 RX ADMIN — TRAMADOL HYDROCHLORIDE 50 MG: 50 TABLET, COATED ORAL at 04:03

## 2023-03-10 RX ADMIN — ACETAMINOPHEN 1000 MG: 500 TABLET ORAL at 02:03

## 2023-03-10 RX ADMIN — INSULIN ASPART 7 UNITS: 100 INJECTION, SOLUTION INTRAVENOUS; SUBCUTANEOUS at 11:03

## 2023-03-10 RX ADMIN — PIPERACILLIN AND TAZOBACTAM 4.5 G: 4; .5 INJECTION, POWDER, LYOPHILIZED, FOR SOLUTION INTRAVENOUS; PARENTERAL at 02:03

## 2023-03-10 RX ADMIN — ATORVASTATIN CALCIUM 40 MG: 40 TABLET, FILM COATED ORAL at 09:03

## 2023-03-10 RX ADMIN — INSULIN ASPART 4 UNITS: 100 INJECTION, SOLUTION INTRAVENOUS; SUBCUTANEOUS at 08:03

## 2023-03-10 RX ADMIN — GUAIFENESIN AND DEXTROMETHORPHAN HYDROBROMIDE 1 TABLET: 600; 30 TABLET, EXTENDED RELEASE ORAL at 09:03

## 2023-03-10 NOTE — PLAN OF CARE
Problem: Adult Inpatient Plan of Care  Goal: Plan of Care Review  Outcome: Ongoing, Progressing  Goal: Patient-Specific Goal (Individualized)  Outcome: Ongoing, Progressing  Goal: Absence of Hospital-Acquired Illness or Injury  Outcome: Ongoing, Progressing  Goal: Optimal Comfort and Wellbeing  Outcome: Ongoing, Progressing     Problem: Diabetes Comorbidity  Goal: Blood Glucose Level Within Targeted Range  Outcome: Ongoing, Progressing     Problem: Adjustment to Illness (Sepsis/Septic Shock)  Goal: Optimal Coping  Outcome: Ongoing, Progressing     Problem: Bleeding (Sepsis/Septic Shock)  Goal: Absence of Bleeding  Outcome: Ongoing, Progressing     Problem: Glycemic Control Impaired (Sepsis/Septic Shock)  Goal: Blood Glucose Level Within Desired Range  Outcome: Ongoing, Progressing     Problem: Infection Progression (Sepsis/Septic Shock)  Goal: Absence of Infection Signs and Symptoms  Outcome: Ongoing, Progressing     Problem: Nutrition Impaired (Sepsis/Septic Shock)  Goal: Optimal Nutrition Intake  Outcome: Ongoing, Progressing     Problem: Fall Injury Risk  Goal: Absence of Fall and Fall-Related Injury  Outcome: Ongoing, Progressing     Problem: Skin Injury Risk Increased  Goal: Skin Health and Integrity  Outcome: Ongoing, Progressing     Problem: Infection  Goal: Absence of Infection Signs and Symptoms  Outcome: Ongoing, Progressing     POC reviewed and discussed with pt; pt A/Ox4; VSS; RLE ace wrap dressing C/D/I; pain not well managed with scheduled and prn meds; no signs of distress; bed locked in lowest position with bed exit alarm engaged; will continue to monitor.

## 2023-03-10 NOTE — PT/OT/SLP PROGRESS
Occupational Therapy   Treatment    Name: Nelsy Marino  MRN: [id number]  Admitting Diagnosis:  Critical limb ischemia with history of revascularization of same extremity  6 Days Post-Op    Recommendations:     Discharge Recommendations: nursing facility, skilled  Discharge Equipment Recommendations:  wheelchair, lift device, hospital bed  Barriers to discharge:       Assessment:     Nelsy Marino is a 82 y.o. female with a medical diagnosis of Critical limb ischemia with history of revascularization of same extremity. Performance deficits affecting function are weakness, impaired endurance, impaired self care skills, impaired functional mobility, gait instability, impaired balance, decreased coordination.     Rehab Prognosis:  Good; patient would benefit from acute skilled OT services to address these deficits and reach maximum level of function.       Plan:     Patient to be seen 3 x/week to address the above listed problems via self-care/home management, therapeutic activities, therapeutic exercises, neuromuscular re-education  Plan of Care Expires: 04/05/23  Plan of Care Reviewed with: patient, daughter    Subjective     Pain/Comfort:  Pain Rating 1: 0/10    Objective:     Communicated with: rn prior to session.  Patient found supine with oxygen, peripheral IV upon OT entry to room.    General Precautions: Standard, fall    Orthopedic Precautions:RLE weight bearing as tolerated  Braces:  (darco)  Respiratory Status: Room air     Occupational Performance:     Bed Mobility:    Patient completed Scooting/Bridging with stand by assistance  Patient completed Supine to Sit with contact guard assistance     Functional Mobility/Transfers:  Patient completed Sit <> Stand Transfer with minimum assistance and of 2 persons  with  rolling walker   Patient completed Bed <> Chair Transfer using Step Transfer technique with minimum assistance with rolling walker  Functional Mobility: Pt took 4 steps to the chair with Min A for  RW management.    Activities of Daily Living:  Lower Body Dressing: maximal assistance     [facility] 6 Click ADL: 18    Treatment & Education:  From chair level, pt completed BUE therex (x 10 reps each) including:  - elbow flexion/extension  - straight arm raises  - hor Abd/Add  - lat rows    Encouraged activity / OOB with staff over the weekend.    Patient left up in chair with all lines intact and call button in reach    GOALS:   Multidisciplinary Problems       Occupational Therapy Goals          Problem: Occupational Therapy    Goal Priority Disciplines Outcome Interventions   Occupational Therapy Goal     OT, PT/OT Ongoing, Progressing    Description: Goals to be met by: 3/20/23     Patient will increase functional independence with ADLs by performing:    Feeding with [place].  UE Dressing with [place].  LE Dressing with Supervision.  Grooming while standing at sink with Stand-by Assistance.  Toileting from toilet with Stand-by Assistance for hygiene and clothing management.   Toilet transfer to toilet with Stand-by Assistance.                         Time Tracking:     OT Date of Treatment: 03/10/23  OT Start Time: 0956  OT Stop Time: 1020  OT Total Time (min): 24 min    Billable Minutes:Therapeutic Activity 12  Therapeutic Exercise 12    OT/GEORGE: OT          3/10/2023

## 2023-03-10 NOTE — SUBJECTIVE & OBJECTIVE
Interval HPI:   Overnight events: No acute events overnight. Patient in room 1062/1062 A. Blood glucose improving. BG at and above goal on current insulin regimen (SSI, prandial, and basal insulin ). Steroid use- None. 6 Days Post-Op  Renal function- Normal   Vasopressors-  None       Endocrine will continue to follow and manage insulin orders inpatient.       Diet diabetic [facility]; 2000 Calorie; Fluid - 1000mL     Eatin%  Nausea: No  Hypoglycemia and intervention: No  Fever: No  TPN and/or TF: No      ROS:  As above    Current Medications and/or Treatments Impacting Glycemic Control  Immunotherapy:    Immunosuppressants       None          Steroids:   Hormones (From admission, onward)      Start     Stop Route Frequency Ordered    23 2100  melatonin tablet 6 mg         -- Oral Nightly 23 1546          Pressors:    Autonomic Drugs (From admission, onward)      None          Hyperglycemia/Diabetes Medications:   Antihyperglycemics (From admission, onward)      Start     Stop Route Frequency Ordered    23 2100  insulin detemir U-100 pen 15 Units         -- SubQ 2 times daily 23 1549    23 1925  insulin aspart U-100 pen 0-15 Units         -- SubQ Before meals & nightly PRN 23 1826             PHYSICAL EXAMINATION:  Vitals:    03/10/23 0714   BP: 114/65   Pulse: 89   Resp: 18   Temp: 98.6 °F (37 °C)     Body mass index is 29.35 kg/m².

## 2023-03-10 NOTE — PROGRESS NOTES
[facility]  Podiatry  Progress Note    Patient Name: Nelsy Marino  MRN: [id number]  Admission Date: 2/27/2023  Hospital Length of Stay: 11 days  Attending Physician: Tanner Coello MD  Primary Care Provider: [facility]     Subjective:     Interval History: Patient is seen laying in bed. Patient denies any fever chills nausea or vomiting. Patient admits to right lower leg and foot pain. Patient has no other pedal complaints at this time.     Follow-up For: Procedure(s) (LRB):  AMPUTATION, FOOT, TRANSMETATARSAL (Right)    Post-Operative Day: 6 Days Post-Op    Scheduled Meds:   acetaminophen  1,000 mg Oral Q8H    amLODIPine  10 mg Oral Daily    apixaban  5 mg Oral BID    aspirin  81 mg Oral Daily    atorvastatin  40 mg Oral QHS    clopidogreL  75 mg Oral Daily    dextromethorphan-guaiFENesin  mg  1 tablet Oral BID    furosemide (LASIX) injection  60 mg Intravenous BID loop    insulin aspart U-100  7 Units Subcutaneous TIDWM    insulin detemir U-100  20 Units Subcutaneous QHS    levalbuterol  1.25 mg Nebulization Q8H WA    melatonin  6 mg Oral Nightly    mupirocin   Nasal BID    piperacillin-tazobactam (ZOSYN) IVPB  4.5 g Intravenous Q8H     Continuous Infusions:  PRN Meds:bisacodyL, dextrose 10%, dextrose 10%, dextrose, dextrose, glucagon (human recombinant), glucagon (human recombinant), insulin aspart U-100, levalbuterol, naloxone, ondansetron, prochlorperazine, simethicone, sodium chloride 0.9%, traMADoL    Review of Systems   Constitutional:  Negative for chills and fever.   HENT:  Negative for hearing loss and trouble swallowing.    Eyes:  Negative for discharge and visual disturbance.   Respiratory:  Negative for chest tightness and shortness of breath.    Cardiovascular:  Negative for chest pain and palpitations.   Gastrointestinal:  Negative for abdominal distention, abdominal pain, nausea and vomiting.   Genitourinary:  Negative for difficulty urinating and hematuria.    Musculoskeletal:  Negative for arthralgias and back pain.   Skin:  Positive for color change and wound. Negative for rash.   Neurological:  Negative for dizziness and headaches.   Objective:     Vital Signs (Most Recent):  Temp: 98.9 °F (37.2 °C) (03/10/23 1135)  Pulse: 87 (03/10/23 1420)  Resp: 18 (03/10/23 1420)  BP: (!) 113/54 (03/10/23 1135)  SpO2: 98 % (03/10/23 1420)   Vital Signs (24h Range):  Temp:  [97.4 °F (36.3 °C)-100.5 °F (38.1 °C)] 98.9 °F (37.2 °C)  Pulse:  [80-98] 87  Resp:  [13-20] 18  SpO2:  [97 %-100 %] 98 %  BP: (113-134)/(54-65) 113/54     Weight: 73.5 kg (162 lb 0.6 oz)  Body mass index is 27.81 kg/m².    Foot Exam    General  Orientation: alert and oriented to person, place, and time   Affect: appropriate       Right Foot/Ankle     Inspection and Palpation  Tenderness: (TMA site, mild)  Swelling: (TMA site, mild)  Skin Exam: drainage, maceration and ulcer (incision);     Neurovascular  Dorsalis pedis: doppler  Posterior tibial: doppler  Saphenous nerve sensation: diminished  Tibial nerve sensation: diminished  Superficial peroneal nerve sensation: diminished  Deep peroneal nerve sensation: diminished  Sural nerve sensation: diminished    Comments  S/p right tma. Sutures are intact without dehiscence. Mild serosanguinous drainage from central area of incision, with periwound maceration. Mild tenderness throughout surgical site.     Left Foot/Ankle      Inspection and Palpation  Tenderness: none   Swelling: none   Skin Exam: skin intact;     Neurovascular  Dorsalis pedis: absent  Posterior tibial: doppler  Saphenous nerve sensation: diminished  Tibial nerve sensation: diminished  Superficial peroneal nerve sensation: diminished  Deep peroneal nerve sensation: diminished  Sural nerve sensation: diminished    Comments  No open wounds noted    Laboratory:  A1C:   Recent Labs   Lab 02/28/23  0549   HGBA1C 8.2*     Blood Cultures:   Recent Labs   Lab 03/08/23  4426   LABBLOO No Growth to date  No  Growth to date  No Growth to date  No Growth to date     BMP:   Recent Labs   Lab 03/10/23  0413   *      K 3.9   CL 99   CO2 29   BUN 46*   CREATININE 1.4   CALCIUM 8.8   MG 2.2     CBC:   Recent Labs   Lab 03/10/23  0413   WBC 15.46*   RBC 2.53*   HGB 6.4*   HCT 20.6*      MCV 81*   MCH 25.3*   MCHC 31.1*     CMP:   Recent Labs   Lab 03/10/23  0413   *   CALCIUM 8.8   ALBUMIN 2.0*   PROT 6.7      K 3.9   CO2 29   CL 99   BUN 46*   CREATININE 1.4   ALKPHOS 64   ALT 11   AST 17   BILITOT 0.6     Coagulation:   Recent Labs   Lab 03/10/23  0413   APTT 37.6*     CRP:   Recent Labs   Lab 03/10/23  0942   .0*     ESR:   Recent Labs   Lab 03/10/23  0942   SEDRATE 64*     Prealbumin: No results for input(s): PREALBUMIN in the last 48 hours.  Wound Cultures:   Recent Labs   Lab 03/04/23  1141   LABAERO No growth     Microbiology Results (last 7 days)       Procedure Component Value Units Date/Time    Culture, Anaerobe [[id number]] Collected: 03/10/23 1340    Order Status: Sent Specimen: Wound from Foot, Right Updated: 03/10/23 1345    Gram stain [[id number]] Collected: 03/10/23 1340    Order Status: Sent Specimen: Wound from Foot, Right Updated: 03/10/23 1344    Aerobic culture [[id number]] Collected: 03/10/23 1340    Order Status: Sent Specimen: Wound from Foot, Right Updated: 03/10/23 1344    Blood culture [[id number]] Collected: 03/10/23 0942    Order Status: Sent Specimen: Blood from Antecubital, Right Arm Updated: 03/10/23 0949    Blood culture [[id number]] Collected: 03/10/23 0943    Order Status: Sent Specimen: Blood from Peripheral, Right Hand Updated: 03/10/23 0949    Blood culture [[id number]] Collected: 03/08/23 2325    Order Status: Completed Specimen: Blood from Peripheral, Lower Arm, Left Updated: 03/10/23 0613     Blood Culture, Routine No Growth to date      No Growth to date    Narrative:      Left Peripheral    Blood culture [[id number]] Collected: 03/08/23 6635    Order  Status: Completed Specimen: Blood from Peripheral, Lower Arm, Right Updated: 03/10/23 0613     Blood Culture, Routine No Growth to date      No Growth to date    Narrative:      Right Peripheral    Culture, Anaerobe [[id number]] Collected: 03/04/23 1141    Order Status: Completed Specimen: Incision site from Foot, Right Updated: 03/09/23 1435     Anaerobic Culture Culture in progress    Narrative:      3rd Metatarsal    Fungus culture [[id number]] Collected: 03/04/23 1141    Order Status: Completed Specimen: Incision site from Foot, Right Updated: 03/09/23 1018     Fungus (Mycology) Culture Culture in progress    Narrative:      3rd Metatarsal    Aerobic culture [[id number]] Collected: 03/04/23 1141    Order Status: Completed Specimen: Incision site from Foot, Right Updated: 03/07/23 0844     Aerobic Bacterial Culture No growth    Narrative:      3rd Metatarsal    Gram stain [[id number]] Collected: 03/04/23 1141    Order Status: Completed Specimen: Incision site from Foot, Right Updated: 03/04/23 1418     Gram Stain Result No WBC's      No organisms seen    Narrative:      3rd Metatarsal    AFB Culture & Smear [[id number]] Collected: 03/04/23 1150    Order Status: Sent Specimen: Incision site from Foot, Right Updated: 03/04/23 1150    Blood culture #2 **CANNOT BE ORDERED STAT** [[id number]] Collected: 02/27/23 1923    Order Status: Completed Specimen: Blood from Peripheral, Antecubital, Right Updated: 03/03/23 2103     Blood Culture, Routine No Growth after 4 days.    Blood culture #1 **CANNOT BE ORDERED STAT** [[id number]] Collected: 02/27/23 1428    Order Status: Completed Specimen: Blood from Peripheral, Antecubital, Left Updated: 03/03/23 1503     Blood Culture, Routine No Growth after 4 days.          Specimen (24h ago, onward)      None          Assessment/Plan:     Orthopedic  S/P transmetatarsal amputation of foot, right  Plan:  -OK for discharge from podiatry perspective.  -Antibiotic plan per ID.  - Two sutures  were removed without incident from right TMA site along the central aspect. Seroangious fluid was noted from tma site and cultured.  -Appreciate vascular surgery recs.  -Dressing changes by nursing.  -RLE WB to heel in surgical shoe for transfers or short distances.   -Elevate RLE at all times while in bed or chair.  -Podiatry will follow.   DC Instructions: Please order ambulatory referral to podiatry for outpatient followup. RLE WB to heel in surgical shoe for transfers or short distances. HH/facility dressing changes R foot 3x/week: cleanse incision with saline, Pack wound with Iodoform, apply betadine soaked adaptic, apply gauze, wrap with cast padding and coban.         Danisha Hanley DPM PGY-1  Podiatric Medicine & Surgery  [facility]  Secure Chat Preferred  Mobile: [phone]  Pager: [phone]

## 2023-03-10 NOTE — ASSESSMENT & PLAN NOTE
Endocrinology consulted for BG management.   BG goal 140-180    WBD at 0.5 units/kg/day  - Levemir (Insulin Detemir) 20 units daily  - Novolog (Insulin Aspart) 7 units TIDWM and prn for BG excursions [facility] (150/25)  - BG checks AC/HS  - Hypoglycemia protocol in place  - If blood glucose greater than 300, please ask patient not to eat food or drink anything other than water until correctional insulin has brought it back below 250    ** Please notify Endocrine for any change and/or advance in diet**  ** Please call Endocrine for any BG related issues **    Discharge Planning:   TBD. Please notify endocrinology prior to discharge.

## 2023-03-10 NOTE — CONSULTS
Valentin Lopez - [facility]  Endocrinology Consult Note    Consult Requested by: Tanner Coello MD   Reason for admit: Critical limb ischemia with history of revascularization of same extremity    HISTORY OF PRESENT ILLNESS:  Reason for Consult: Management of T2DM, Hyperglycemia     Surgical Procedure and Date: s/p  Left external iliac to right SFA bypass with 8mm PTFE; Left common and external iliac artery angioplasty and stenting, 8mm Lifestent; Aortogram, pelvic angiogram; Right lower extremity angiogram; and Right SFA angioplasty with plain and drug coated 4mm balloon on 3/8/2023    Diabetes diagnosis year: approx 26 years ago    Home Diabetes Medications:  Glipizide 10 mg daily, Novolog-NPH 70/30 50 units BID (prior Metformin but no longer) - uncertain why both sulfonylurea and insulin    How often checking glucose at home? 1-3 x day   BG readings on regimen: 100-200  Hypoglycemia on the regimen?  Yes at times  Missed doses on regimen?  No    Diabetes Complications include:     Hyperglycemia and Diabetic dermatitis    Complicating diabetes co morbidities:   HTN; DVT; CHF    HPI: This is an 82-year-old female with a past medical history of severe PAD, hypertension, and insulin dependant type 2 diabetes, history of DVT who presents with right foot pain. Patient reports worsening right foot pain, swelling and discoloration that started after she had an ingrown toenail removed by her podiatrist.  She reports that her discoloration started since 2/7. Of note, the patient underwent an angiogram on 02/23 by vascular surgery. In the ED, she was tachycardic (112), and hypertensive.  Labs showed microcytic anemia (10.2), elevated sed rate (112), elevated CRP (56.5), elevated lactic acid (2.5).  Foot x-ray showed DJD and spurs on the calcaneus with no acute process. Vascular surgery recommended  transfer to [facility] [place] for hybrid OR surgery 3/2/23. Now s/p  Left external iliac to right SFA bypass with 8mm PTFE; Left common and external  iliac artery angioplasty and stenting, 8mm Lifestent; Aortogram, pelvic angiogram; Right lower extremity angiogram; and Right SFA angioplasty with plain and drug coated 4mm balloon on 3/8/2023. Endocrine consulted to manage hyperglycemia and type 2 diabetes. Primary requesting BG not exceed 180 mg/dL.      Lab Results   Component Value Date    HGBA1C 8.2 (H) 02/28/2023     On evaluation the patient confirms that she has been a diabetic for 26 years.  She is on both Glipizide and 70/30 insulin.  Managed by her PCP.  She denies any issues with hypoglycemia or symptoms concerning for this.  Family history of diabetes includes multiple sisters and brothers.        No new subjective & objective note has been filed under this hospital service since the last note was generated.      Labs Reviewed and Include   Recent Labs   Lab 03/10/23  0413   *   CALCIUM 8.8   ALBUMIN 2.0*   PROT 6.7      K 3.9   CO2 29   CL 99   BUN 46*   CREATININE 1.4   ALKPHOS 64   ALT 11   AST 17   BILITOT 0.6     Lab Results   Component Value Date    WBC 15.46 (H) 03/10/2023    HGB 6.4 (L) 03/10/2023    HCT 20.6 (L) 03/10/2023    MCV 81 (L) 03/10/2023     03/10/2023     No results for input(s): TSH, FREET4 in the last 168 hours.  Lab Results   Component Value Date    HGBA1C 8.2 (H) 02/28/2023       Nutritional status:   Body mass index is 27.81 kg/m².  Lab Results   Component Value Date    ALBUMIN 2.0 (L) 03/10/2023    ALBUMIN 2.0 (L) 03/09/2023    ALBUMIN 1.8 (L) 03/08/2023     No results found for: PREALBUMIN    Estimated Creatinine Clearance: 30.4 mL/min (based on SCr of 1.4 mg/dL).    Accu-Checks  Recent Labs     03/08/23  0743 03/08/23  2032 03/09/23  0735 03/09/23  1151 03/09/23  1540 03/09/23  1943 03/09/23  2213 03/10/23  0710 03/10/23  1133 03/10/23  1502   POCTGLUCOSE 226* 238* 294* 325* 291* 391* 331* 210* 193* 263*        ASSESSMENT and PLAN    Cardiac/Vascular  * Critical limb ischemia with history of  revascularization of same extremity  Optimize BG control to improve wound healing        Essential hypertension  Uncontrolled HTN can worsen insulin resistance.   Patient on an ARB per ADA guidelines.         Endocrine  Type 2 diabetes mellitus  Endocrinology consulted for BG management.   BG goal 140-180    WBD at 0.5 units/kg/day  - Levemir (Insulin Detemir) 20 units daily  - Novolog (Insulin Aspart) 7 units TIDWM and prn for BG excursions [facility] SSI (150/25)  - BG checks AC/HS  - Hypoglycemia protocol in place  - If blood glucose greater than 300, please ask patient not to eat food or drink anything other than water until correctional insulin has brought it back below 250    ** Please notify Endocrine for any change and/or advance in diet**  ** Please call Endocrine for any BG related issues **    Discharge Planning:   TBD. Please notify endocrinology prior to discharge.            Kashmir Madrigal,   Endocrinology

## 2023-03-10 NOTE — PLAN OF CARE
MD at bedside collecting culture from R foot amputation wound. This RN sent to lab with requisition.

## 2023-03-10 NOTE — PT/OT/SLP PROGRESS
Physical Therapy Treatment    Patient Name:  Nelsy Marino   MRN:  [id number]    Recommendations:     Discharge Recommendations: nursing facility, skilled  Discharge Equipment Recommendations:  (TBD)  Barriers to discharge: Inaccessible home and Decreased caregiver support    Assessment:     Nelsy Marino is a 82 y.o. female admitted with a medical diagnosis of Critical limb ischemia with history of revascularization of same extremity.  She presents with the following impairments/functional limitations: weakness, impaired endurance, impaired self care skills, impaired functional mobility, impaired balance, gait instability, decreased lower extremity function, pain, decreased safety awareness. Pt participated well with therapy on this date. Pt required CGA for bed mobility, Min A x2 w/ RW for transfers, and took ~4 steps to bedside chair with Min A for RW mgmt. Pt will continue to benefit from skilled PT services to improve all deficits noted above. Resume PT POC as indicated.     Rehab Prognosis: Good; patient would benefit from acute skilled PT services to address these deficits and reach maximum level of function.    Recent Surgery: Procedure(s) (LRB):  AMPUTATION, FOOT, TRANSMETATARSAL (Right) 6 Days Post-Op    Plan:     During this hospitalization, patient to be seen 4 x/week to address the identified rehab impairments via gait training, therapeutic activities, therapeutic exercises, neuromuscular re-education and progress toward the following goals:    Plan of Care Expires:  04/04/23    Subjective     Pt was willing to participate with therapy.   Pain/Comfort:  Pain Rating 1:  (not rated)  Location - Side 1: Bilateral  Location 1:  (BLE's)  Pain Rating Post-Intervention 1:  (Pt did not rate.)      Objective:     Communicated with nursing prior to session.  Patient found HOB elevated with  (all lines intact and family present) upon PT entry to room.     General Precautions: Standard, fall  Orthopedic  Precautions: RLE weight bearing as tolerated  Braces:  (DARCO)  Respiratory Status: Room air     Functional Mobility:  Bed Mobility:  Scooting: contact guard assistance  Supine to Sit: contact guard assistance  Transfers:  Sit to Stand:  minimum assistance and of 2 persons with rolling walker  Bed to Chair: minimum assistance with  rolling walker  using  Step Transfer  Gait: Pt took 4 steps to bedside chair with Min A for RW management.       AM-PAC 6 CLICK MOBILITY  Turning over in bed (including adjusting bedclothes, sheets and blankets)?: 3  Sitting down on and standing up from a chair with arms (e.g., wheelchair, bedside commode, etc.): 2  Moving from lying on back to sitting on the side of the bed?: 2  Moving to and from a bed to a chair (including a wheelchair)?: 2  Need to walk in hospital room?: 2  Climbing 3-5 steps with a railing?: 1  Basic Mobility Total Score: 12       Treatment & Education:  -BLE therex x10 reps   -All questions/concerns answered within PTA scope of practice.     Patient left up in chair BLE elevated with all lines intact, call button in reach, nursing notified, and family present..    GOALS:   Multidisciplinary Problems       Physical Therapy Goals          Problem: Physical Therapy    Goal Priority Disciplines Outcome Goal Variances Interventions   Physical Therapy Goal     PT, PT/OT Ongoing, Progressing     Description: Goals to be met by: 3/20     Patient will increase functional independence with mobility by performin. Supine to sit with Stand-by Assistance  2. Rolling to Left and Right with Stand-by Assistance.  3. Sit to stand transfer with Contact Guard Assistance using Rolling Walker  4. Gait  x 50 feet with Contact Guard Assistance using Rolling Walker.   5. Ascend/descend 1 stair with bilateral Handrails Contact Guard Assistance using Rolling Walker.                          Time Tracking:     PT Received On: 03/10/23  PT Start Time: 957     PT Stop Time: 1  PT  Total Time (min): 24 min     Billable Minutes: Therapeutic Activity 24    Treatment Type: Treatment  PT/PTA: PTA     PTA Visit Number: 3     03/10/2023

## 2023-03-10 NOTE — SUBJECTIVE & OBJECTIVE
Medications:  Continuous Infusions:  Scheduled Meds:   acetaminophen  1,000 mg Oral Q8H    amLODIPine  10 mg Oral Daily    apixaban  5 mg Oral BID    aspirin  81 mg Oral Daily    atorvastatin  40 mg Oral QHS    clopidogreL  75 mg Oral Daily    dextromethorphan-guaiFENesin  mg  1 tablet Oral BID    furosemide (LASIX) injection  40 mg Intravenous BID loop    insulin aspart U-100  7 Units Subcutaneous TIDWM    insulin detemir U-100  20 Units Subcutaneous QHS    levalbuterol  1.25 mg Nebulization Q8H WA    melatonin  6 mg Oral Nightly    mupirocin   Nasal BID    piperacillin-tazobactam (ZOSYN) IVPB  4.5 g Intravenous Q8H     PRN Meds:bisacodyL, dextrose 10%, dextrose 10%, dextrose, dextrose, glucagon (human recombinant), glucagon (human recombinant), insulin aspart U-100, levalbuterol, naloxone, ondansetron, prochlorperazine, simethicone, sodium chloride 0.9%, traMADoL     Objective:     Vital Signs (Most Recent):  Temp: 98.6 °F (37 °C) (03/10/23 0714)  Pulse: 84 (03/10/23 0828)  Resp: 18 (03/10/23 0828)  BP: 114/65 (03/10/23 0714)  SpO2: 97 % (03/10/23 0828) Vital Signs (24h Range):  Temp:  [97.4 °F (36.3 °C)-100.5 °F (38.1 °C)] 98.6 °F (37 °C)  Pulse:  [84-98] 84  Resp:  [13-20] 18  SpO2:  [97 %-100 %] 97 %  BP: (108-134)/(53-65) 114/65         Physical Exam  Vitals reviewed.   Constitutional:       Appearance: Normal appearance.   HENT:      Head: Normocephalic.      Mouth/Throat:      Mouth: Mucous membranes are moist.   Eyes:      Pupils: Pupils are equal, round, and reactive to light.   Cardiovascular:      Rate and Rhythm: Normal rate and regular rhythm.      Comments: R biphasic DP and PT   L monophasic DP and biphasic PT  Pulmonary:      Effort: Pulmonary effort is normal.      Breath sounds: Wheezing present.   Abdominal:      General: Abdomen is flat.      Palpations: Abdomen is soft.   Musculoskeletal:         General: Normal range of motion.      Cervical back: Normal range of motion.   Skin:      General: Skin is warm and dry.      Capillary Refill: Capillary refill takes less than 2 seconds.      Comments: R thigh incision with dressing - c/d/i  L groin dressing- c/d/i  R foot s/p TMA, no purulence or erythema    Neurological:      General: No focal deficit present.      Mental Status: She is alert.   Psychiatric:         Mood and Affect: Mood normal.       Significant Labs:  CBC:   Recent Labs   Lab 03/10/23  0413   WBC 15.46*   RBC 2.53*   HGB 6.4*   HCT 20.6*      MCV 81*   MCH 25.3*   MCHC 31.1*     CMP:   Recent Labs   Lab 03/10/23  0413   *   CALCIUM 8.8   ALBUMIN 2.0*   PROT 6.7      K 3.9   CO2 29   CL 99   BUN 46*   CREATININE 1.4   ALKPHOS 64   ALT 11   AST 17   BILITOT 0.6       Significant Diagnostics:  I have reviewed all pertinent imaging results/findings within the past 24 hours.

## 2023-03-10 NOTE — ASSESSMENT & PLAN NOTE
Endocrinology consulted for BG management.   BG goal 140-180    WBD at 0.5 units/kg/day  - Levemir (Insulin Detemir) 20 units daily  - Novolog (Insulin Aspart) 7 units TIDWM and prn for BG excursions [facility] SSI (150/25)  - BG checks AC/HS  - Hypoglycemia protocol in place  - If blood glucose greater than 300, please ask patient not to eat food or drink anything other than water until correctional insulin has brought it back below 250    ** Please notify Endocrine for any change and/or advance in diet**  ** Please call Endocrine for any BG related issues **    Discharge Planning:   TBD. Please notify endocrinology prior to discharge.

## 2023-03-10 NOTE — PROGRESS NOTES
Valentin Lopez - [facility]  Endocrinology  Progress Note    Admit Date: 2/27/2023     Reason for Consult: Management of T2DM, Hyperglycemia     Surgical Procedure and Date: s/p  Left external iliac to right SFA bypass with 8mm PTFE; Left common and external iliac artery angioplasty and stenting, 8mm Lifestent; Aortogram, pelvic angiogram; Right lower extremity angiogram; and Right SFA angioplasty with plain and drug coated 4mm balloon on 3/8/2023    Diabetes diagnosis year: approx 26 years ago    Home Diabetes Medications:  Glipizide 10 mg daily, Novolog-NPH 70/30 50 units BID (prior Metformin but no longer) - uncertain why both sulfonylurea and insulin    How often checking glucose at home? 1-3 x day   BG readings on regimen: 100-200  Hypoglycemia on the regimen?  No  Missed doses on regimen?  No    Diabetes Complications include:     Hyperglycemia and Diabetic dermatitis    Complicating diabetes co morbidities:   HTN; DVT; CHF      HPI: This is an 82-year-old female with a past medical history of severe PAD, hypertension, and insulin dependant type 2 diabetes, history of DVT who presents with right foot pain. Patient reports worsening right foot pain, swelling and discoloration that started after she had an ingrown toenail removed by her podiatrist.  She reports that her discoloration started since 2/7. Of note, the patient underwent an angiogram on 02/23 by vascular surgery. In the ED, she was tachycardic (112), and hypertensive.  Labs showed microcytic anemia (10.2), elevated sed rate (112), elevated CRP (56.5), elevated lactic acid (2.5).  Foot x-ray showed DJD and spurs on the calcaneus with no acute process. Vascular surgery recommended  transfer to [facility] [place] [place] for hybrid OR surgery 3/2/23. Now s/p  Left external iliac to right SFA bypass with 8mm PTFE; Left common and external iliac artery angioplasty and stenting, 8mm Lifestent; Aortogram, pelvic angiogram; Right lower extremity angiogram; and Right SFA angioplasty with  plain and drug coated 4mm balloon on 3/8/2023. Endocrine consulted to manage hyperglycemia and type 2 diabetes. Primary requesting BG not exceed 180 mg/dL.      Lab Results   Component Value Date    HGBA1C 8.2 (H) 2023     On evaluation the patient confirms that she has been a diabetic for 26 years.  She is on both Glipizide and 70/30 insulin.  Managed by her PCP.  She denies any issues with hypoglycemia or symptoms concerning for this.  Family history of diabetes includes multiple sisters and brothers.        Interval HPI:   Overnight events: No acute events overnight. Patient in room 1062/1062 A. Blood glucose improving. BG at and above goal on current insulin regimen (SSI, prandial, and basal insulin ). Steroid use- None. 6 Days Post-Op  Renal function- Normal   Vasopressors-  None       Endocrine will continue to follow and manage insulin orders inpatient.       Diet diabetic [facility]; 2000 Calorie; Fluid - 1000mL     Eatin%  Nausea: No  Hypoglycemia and intervention: No  Fever: No  TPN and/or TF: No      PMH, PSH, FH, SH updated and reviewed     ROS:  As above    Current Medications and/or Treatments Impacting Glycemic Control  Immunotherapy:    Immunosuppressants       None          Steroids:   Hormones (From admission, onward)      Start     Stop Route Frequency Ordered    23 2100  melatonin tablet 6 mg         -- Oral Nightly 23 1546          Pressors:    Autonomic Drugs (From admission, onward)      None          Hyperglycemia/Diabetes Medications:   Antihyperglycemics (From admission, onward)      Start     Stop Route Frequency Ordered    23 2100  insulin detemir U-100 pen 15 Units         -- SubQ 2 times daily 23 1549    23 1925  insulin aspart U-100 pen 0-15 Units         -- SubQ Before meals & nightly PRN 23 1826             PHYSICAL EXAMINATION:  Vitals:    03/10/23 0714   BP: 114/65   Pulse: 89   Resp: 18   Temp: 98.6 °F (37 °C)     Body mass  index is 29.35 kg/m².    Physical Exam  Vitals reviewed.   Constitutional:       Appearance: Normal appearance.   HENT:      Head: Normocephalic and atraumatic.   Eyes:      Extraocular Movements: Extraocular movements intact.   Cardiovascular:      Rate and Rhythm: Normal rate.   Pulmonary:      Effort: Pulmonary effort is normal.   Neurological:      General: No focal deficit present.      Mental Status: She is alert and oriented to person, place, and time.   Psychiatric:         Mood and Affect: Mood normal.         Behavior: Behavior normal.           ASSESSMENT and PLAN    Cardiac/Vascular  * Critical limb ischemia with history of revascularization of same extremity  Optimize BG control to improve wound healing        Essential hypertension  Uncontrolled HTN can worsen insulin resistance.   Patient on an ARB per ADA guidelines.         Endocrine  Type 2 diabetes mellitus  Endocrinology consulted for BG management.   BG goal 140-180    WBD at 0.5 units/kg/day  - Levemir (Insulin Detemir) 20 units daily  - Novolog (Insulin Aspart) 7 units TIDWM and prn for BG excursions [facility] SSI (150/25)  - BG checks AC/HS  - Hypoglycemia protocol in place  - If blood glucose greater than 300, please ask patient not to eat food or drink anything other than water until correctional insulin has brought it back below 250    ** Please notify Endocrine for any change and/or advance in diet**  ** Please call Endocrine for any BG related issues **    Discharge Planning:   TBD. Please notify endocrinology prior to discharge.            Kashmir Madrigal, DO  Endocrinology

## 2023-03-10 NOTE — HPI
Reason for Consult: Management of T2DM, Hyperglycemia     Surgical Procedure and Date: s/p  Left external iliac to right SFA bypass with 8mm PTFE; Left common and external iliac artery angioplasty and stenting, 8mm Lifestent; Aortogram, pelvic angiogram; Right lower extremity angiogram; and Right SFA angioplasty with plain and drug coated 4mm balloon on 3/8/2023    Diabetes diagnosis year: approx 26 years ago    Home Diabetes Medications:  Glipizide 10 mg daily, Novolog-NPH 70/30 50 units BID (prior Metformin but no longer) - uncertain why both sulfonylurea and insulin    How often checking glucose at home? 1-3 x day   BG readings on regimen: 100-200  Hypoglycemia on the regimen?  Yes at times  Missed doses on regimen?  No    Diabetes Complications include:     Hyperglycemia and Diabetic dermatitis    Complicating diabetes co morbidities:   HTN; DVT; CHF    HPI: This is an 82-year-old female with a past medical history of severe PAD, hypertension, and insulin dependant type 2 diabetes, history of DVT who presents with right foot pain. Patient reports worsening right foot pain, swelling and discoloration that started after she had an ingrown toenail removed by her podiatrist.  She reports that her discoloration started since 2/7. Of note, the patient underwent an angiogram on 02/23 by vascular surgery. In the ED, she was tachycardic (112), and hypertensive.  Labs showed microcytic anemia (10.2), elevated sed rate (112), elevated CRP (56.5), elevated lactic acid (2.5).  Foot x-ray showed DJD and spurs on the calcaneus with no acute process. Vascular surgery recommended  transfer to [facility] [place] for hybrid OR surgery 3/2/23. Now s/p  Left external iliac to right SFA bypass with 8mm PTFE; Left common and external iliac artery angioplasty and stenting, 8mm Lifestent; Aortogram, pelvic angiogram; Right lower extremity angiogram; and Right SFA angioplasty with plain and drug coated 4mm balloon on 3/8/2023. Endocrine consulted to  manage hyperglycemia and type 2 diabetes. Primary requesting BG not exceed 180 mg/dL.      Lab Results   Component Value Date    HGBA1C 8.2 (H) 02/28/2023     On evaluation the patient confirms that she has been a diabetic for 26 years.  She is on both Glipizide and 70/30 insulin.  Managed by her PCP.  She denies any issues with hypoglycemia or symptoms concerning for this.  Family history of diabetes includes multiple sisters and brothers.

## 2023-03-10 NOTE — ASSESSMENT & PLAN NOTE
Plan:  -OK for discharge from podiatry perspective.  -Antibiotic plan per ID.  - Two sutures were removed without incident from right TMA site along the central aspect. Seroangious fluid was noted from tma site and cultured.  -Appreciate vascular surgery recs.  -Dressing changes by nursing.  -RLE WB to heel in surgical shoe for transfers or short distances.   -Elevate RLE at all times while in bed or chair.  -Podiatry will follow.   DC Instructions: Please order ambulatory referral to podiatry for outpatient followup. RLE WB to heel in surgical shoe for transfers or short distances. HH/facility dressing changes R foot 3x/week: cleanse incision with saline, Pack wound with Iodoform, apply betadine soaked adaptic, apply gauze, wrap with cast padding and coban.

## 2023-03-10 NOTE — ASSESSMENT & PLAN NOTE
Nelsy Marino is a 82 y.o. lady with severe PAD    - s/p TMA 3/4  - UA cultures negative, blood cultures no growth to date   - please have a low threshold to escalate antibiotics. If her graft becomes infected and requires antibiotics, she has no other options left for revascularizations  - Q4H neurovasc checks  - Continue diet  - antibiotics per medicine  - ASA/plavix/Statin/Eliquis   - PT/OT  - Podiatry following  -Reached out to ID today for recs  - remainder of care per primary    Dispo: SNF  Follow up in two weeks with Bettina, ARCHIE/TBI, US Graft

## 2023-03-10 NOTE — SUBJECTIVE & OBJECTIVE
Subjective:     Interval History: Patient is seen laying in bed. Patient denies any fever chills nausea or vomiting. Patient admits to right lower leg and foot pain. Patient has no other pedal complaints at this time.     Follow-up For: Procedure(s) (LRB):  AMPUTATION, FOOT, TRANSMETATARSAL (Right)    Post-Operative Day: 6 Days Post-Op    Scheduled Meds:   acetaminophen  1,000 mg Oral Q8H    amLODIPine  10 mg Oral Daily    apixaban  5 mg Oral BID    aspirin  81 mg Oral Daily    atorvastatin  40 mg Oral QHS    clopidogreL  75 mg Oral Daily    dextromethorphan-guaiFENesin  mg  1 tablet Oral BID    furosemide (LASIX) injection  60 mg Intravenous BID loop    insulin aspart U-100  7 Units Subcutaneous TIDWM    insulin detemir U-100  20 Units Subcutaneous QHS    levalbuterol  1.25 mg Nebulization Q8H WA    melatonin  6 mg Oral Nightly    mupirocin   Nasal BID    piperacillin-tazobactam (ZOSYN) IVPB  4.5 g Intravenous Q8H     Continuous Infusions:  PRN Meds:bisacodyL, dextrose 10%, dextrose 10%, dextrose, dextrose, glucagon (human recombinant), glucagon (human recombinant), insulin aspart U-100, levalbuterol, naloxone, ondansetron, prochlorperazine, simethicone, sodium chloride 0.9%, traMADoL    Review of Systems   Constitutional:  Negative for chills and fever.   HENT:  Negative for hearing loss and trouble swallowing.    Eyes:  Negative for discharge and visual disturbance.   Respiratory:  Negative for chest tightness and shortness of breath.    Cardiovascular:  Negative for chest pain and palpitations.   Gastrointestinal:  Negative for abdominal distention, abdominal pain, nausea and vomiting.   Genitourinary:  Negative for difficulty urinating and hematuria.   Musculoskeletal:  Negative for arthralgias and back pain.   Skin:  Positive for color change and wound. Negative for rash.   Neurological:  Negative for dizziness and headaches.   Objective:     Vital Signs (Most Recent):  Temp: 98.9 °F (37.2 °C)  (03/10/23 1135)  Pulse: 87 (03/10/23 1420)  Resp: 18 (03/10/23 1420)  BP: (!) 113/54 (03/10/23 1135)  SpO2: 98 % (03/10/23 1420)   Vital Signs (24h Range):  Temp:  [97.4 °F (36.3 °C)-100.5 °F (38.1 °C)] 98.9 °F (37.2 °C)  Pulse:  [80-98] 87  Resp:  [13-20] 18  SpO2:  [97 %-100 %] 98 %  BP: (113-134)/(54-65) 113/54     Weight: 73.5 kg (162 lb 0.6 oz)  Body mass index is 27.81 kg/m².    Foot Exam    General  Orientation: alert and oriented to person, place, and time   Affect: appropriate       Right Foot/Ankle     Inspection and Palpation  Tenderness: (TMA site, mild)  Swelling: (TMA site, mild)  Skin Exam: drainage, maceration and ulcer (incision);     Neurovascular  Dorsalis pedis: doppler  Posterior tibial: doppler  Saphenous nerve sensation: diminished  Tibial nerve sensation: diminished  Superficial peroneal nerve sensation: diminished  Deep peroneal nerve sensation: diminished  Sural nerve sensation: diminished    Comments  S/p right tma. Sutures are intact without dehiscence. Mild serosanguinous drainage from central area of incision, with periwound maceration. Mild tenderness throughout surgical site.     Left Foot/Ankle      Inspection and Palpation  Tenderness: none   Swelling: none   Skin Exam: skin intact;     Neurovascular  Dorsalis pedis: absent  Posterior tibial: doppler  Saphenous nerve sensation: diminished  Tibial nerve sensation: diminished  Superficial peroneal nerve sensation: diminished  Deep peroneal nerve sensation: diminished  Sural nerve sensation: diminished    Comments  No open wounds noted    Laboratory:  A1C:   Recent Labs   Lab 02/28/23  0549   HGBA1C 8.2*     Blood Cultures:   Recent Labs   Lab 03/08/23  9515   LABBLOO No Growth to date  No Growth to date  No Growth to date  No Growth to date     BMP:   Recent Labs   Lab 03/10/23  6603   *      K 3.9   CL 99   CO2 29   BUN 46*   CREATININE 1.4   CALCIUM 8.8   MG 2.2     CBC:   Recent Labs   Lab 03/10/23  3423   WBC  15.46*   RBC 2.53*   HGB 6.4*   HCT 20.6*      MCV 81*   MCH 25.3*   MCHC 31.1*     CMP:   Recent Labs   Lab 03/10/23  0413   *   CALCIUM 8.8   ALBUMIN 2.0*   PROT 6.7      K 3.9   CO2 29   CL 99   BUN 46*   CREATININE 1.4   ALKPHOS 64   ALT 11   AST 17   BILITOT 0.6     Coagulation:   Recent Labs   Lab 03/10/23  0413   APTT 37.6*     CRP:   Recent Labs   Lab 03/10/23  0942   .0*     ESR:   Recent Labs   Lab 03/10/23  0942   SEDRATE 64*     Prealbumin: No results for input(s): PREALBUMIN in the last 48 hours.  Wound Cultures:   Recent Labs   Lab 03/04/23  1141   LABAERO No growth     Microbiology Results (last 7 days)       Procedure Component Value Units Date/Time    Culture, Anaerobe [[id number]] Collected: 03/10/23 1340    Order Status: Sent Specimen: Wound from Foot, Right Updated: 03/10/23 1345    Gram stain [[id number]] Collected: 03/10/23 1340    Order Status: Sent Specimen: Wound from Foot, Right Updated: 03/10/23 1344    Aerobic culture [[id number]] Collected: 03/10/23 1340    Order Status: Sent Specimen: Wound from Foot, Right Updated: 03/10/23 1344    Blood culture [[id number]] Collected: 03/10/23 0942    Order Status: Sent Specimen: Blood from Antecubital, Right Arm Updated: 03/10/23 0949    Blood culture [[id number]] Collected: 03/10/23 0943    Order Status: Sent Specimen: Blood from Peripheral, Right Hand Updated: 03/10/23 0949    Blood culture [[id number]] Collected: 03/08/23 2325    Order Status: Completed Specimen: Blood from Peripheral, Lower Arm, Left Updated: 03/10/23 0613     Blood Culture, Routine No Growth to date      No Growth to date    Narrative:      Left Peripheral    Blood culture [[id number]] Collected: 03/08/23 2325    Order Status: Completed Specimen: Blood from Peripheral, Lower Arm, Right Updated: 03/10/23 0613     Blood Culture, Routine No Growth to date      No Growth to date    Narrative:      Right Peripheral    Culture, Anaerobe [[id number]] Collected:  03/04/23 1141    Order Status: Completed Specimen: Incision site from Foot, Right Updated: 03/09/23 1435     Anaerobic Culture Culture in progress    Narrative:      3rd Metatarsal    Fungus culture [[id number]] Collected: 03/04/23 1141    Order Status: Completed Specimen: Incision site from Foot, Right Updated: 03/09/23 1018     Fungus (Mycology) Culture Culture in progress    Narrative:      3rd Metatarsal    Aerobic culture [[id number]] Collected: 03/04/23 1141    Order Status: Completed Specimen: Incision site from Foot, Right Updated: 03/07/23 0844     Aerobic Bacterial Culture No growth    Narrative:      3rd Metatarsal    Gram stain [[id number]] Collected: 03/04/23 1141    Order Status: Completed Specimen: Incision site from Foot, Right Updated: 03/04/23 1418     Gram Stain Result No WBC's      No organisms seen    Narrative:      3rd Metatarsal    AFB Culture & Smear [[id number]] Collected: 03/04/23 1150    Order Status: Sent Specimen: Incision site from Foot, Right Updated: 03/04/23 1150    Blood culture #2 **CANNOT BE ORDERED STAT** [[id number]] Collected: 02/27/23 1923    Order Status: Completed Specimen: Blood from Peripheral, Antecubital, Right Updated: 03/03/23 2103     Blood Culture, Routine No Growth after 4 days.    Blood culture #1 **CANNOT BE ORDERED STAT** [[id number]] Collected: 02/27/23 1428    Order Status: Completed Specimen: Blood from Peripheral, Antecubital, Left Updated: 03/03/23 1503     Blood Culture, Routine No Growth after 4 days.          Specimen (24h ago, onward)      None

## 2023-03-10 NOTE — PROGRESS NOTES
[facility]  Vascular Surgery  Progress Note    Patient Name: Nelsy Marino  MRN: [id number]  Admission Date: 2/27/2023  Primary Care Provider: Juan F Garay    Subjective:     Interval History: NAEON, VSS- temp 100.5 overnight, patient denying symptoms. Surgical wounds well-appearing.  Patient wheezing this morning, breathing treatments ordered.      Post-Op Info:  Procedure(s) (LRB):  AMPUTATION, FOOT, TRANSMETATARSAL (Right)   6 Days Post-Op       Medications:  Continuous Infusions:  Scheduled Meds:   acetaminophen  1,000 mg Oral Q8H    amLODIPine  10 mg Oral Daily    apixaban  5 mg Oral BID    aspirin  81 mg Oral Daily    atorvastatin  40 mg Oral QHS    clopidogreL  75 mg Oral Daily    dextromethorphan-guaiFENesin  mg  1 tablet Oral BID    furosemide (LASIX) injection  40 mg Intravenous BID loop    insulin aspart U-100  7 Units Subcutaneous TIDWM    insulin detemir U-100  20 Units Subcutaneous QHS    levalbuterol  1.25 mg Nebulization Q8H WA    melatonin  6 mg Oral Nightly    mupirocin   Nasal BID    piperacillin-tazobactam (ZOSYN) IVPB  4.5 g Intravenous Q8H     PRN Meds:bisacodyL, dextrose 10%, dextrose 10%, dextrose, dextrose, glucagon (human recombinant), glucagon (human recombinant), insulin aspart U-100, levalbuterol, naloxone, ondansetron, prochlorperazine, simethicone, sodium chloride 0.9%, traMADoL     Objective:     Vital Signs (Most Recent):  Temp: 98.6 °F (37 °C) (03/10/23 0714)  Pulse: 84 (03/10/23 0828)  Resp: 18 (03/10/23 0828)  BP: 114/65 (03/10/23 0714)  SpO2: 97 % (03/10/23 0828) Vital Signs (24h Range):  Temp:  [97.4 °F (36.3 °C)-100.5 °F (38.1 °C)] 98.6 °F (37 °C)  Pulse:  [84-98] 84  Resp:  [13-20] 18  SpO2:  [97 %-100 %] 97 %  BP: (108-134)/(53-65) 114/65         Physical Exam  Vitals reviewed.   Constitutional:       Appearance: Normal appearance.   HENT:      Head: Normocephalic.      Mouth/Throat:      Mouth: Mucous membranes are moist.   Eyes:      Pupils: Pupils are  equal, round, and reactive to light.   Cardiovascular:      Rate and Rhythm: Normal rate and regular rhythm.      Comments: R biphasic DP and PT   L monophasic DP and biphasic PT  Pulmonary:      Effort: Pulmonary effort is normal.      Breath sounds: Wheezing present.   Abdominal:      General: Abdomen is flat.      Palpations: Abdomen is soft.   Musculoskeletal:         General: Normal range of motion.      Cervical back: Normal range of motion.   Skin:     General: Skin is warm and dry.      Capillary Refill: Capillary refill takes less than 2 seconds.      Comments: R thigh incision with dressing - c/d/i  L groin dressing- c/d/i  R foot s/p TMA, no purulence or erythema    Neurological:      General: No focal deficit present.      Mental Status: She is alert.   Psychiatric:         Mood and Affect: Mood normal.       Significant Labs:  CBC:   Recent Labs   Lab 03/10/23  0413   WBC 15.46*   RBC 2.53*   HGB 6.4*   HCT 20.6*      MCV 81*   MCH 25.3*   MCHC 31.1*     CMP:   Recent Labs   Lab 03/10/23  0413   *   CALCIUM 8.8   ALBUMIN 2.0*   PROT 6.7      K 3.9   CO2 29   CL 99   BUN 46*   CREATININE 1.4   ALKPHOS 64   ALT 11   AST 17   BILITOT 0.6       Significant Diagnostics:  I have reviewed all pertinent imaging results/findings within the past 24 hours.    Assessment/Plan:     * Critical limb ischemia with history of revascularization of same extremity  Nelsy Marino is a 82 y.o. lady with severe PAD    - s/p TMA 3/4  - UA cultures negative, blood cultures no growth to date   - please have a low threshold to escalate antibiotics. If her graft becomes infected and requires antibiotics, she has no other options left for revascularizations  -- antibiotics per medicine  --Reached out to ID today for recs    - ASA/plavix/Statin/Eliquis     - Podiatry following  -Endocrine recs for tight blood glucose control for wound healing  - remainder of care per primary     -Q4H neurovasc checks  - Continue  diet  -PT/OT      Dispo: SNF  Follow up in two weeks with Bettina, ARCHIE/TBI, US Graft        Samantha Ledesma, NP  Vascular Surgery  [facility]

## 2023-03-11 PROBLEM — D72.829 LEUKOCYTOSIS: Status: ACTIVE | Noted: 2023-03-11

## 2023-03-11 LAB
ALBUMIN SERPL BCP-MCNC: 2 G/DL (ref 3.5–5.2)
ALP SERPL-CCNC: 51 U/L (ref 55–135)
ALT SERPL W/O P-5'-P-CCNC: 17 U/L (ref 10–44)
ANION GAP SERPL CALC-SCNC: 11 MMOL/L (ref 8–16)
ANISOCYTOSIS BLD QL SMEAR: SLIGHT
APTT BLDCRRT: 35.6 SEC (ref 21–32)
AST SERPL-CCNC: 24 U/L (ref 10–40)
BASOPHILS # BLD AUTO: 0.02 K/UL (ref 0–0.2)
BASOPHILS NFR BLD: 0.1 % (ref 0–1.9)
BILIRUB SERPL-MCNC: 0.6 MG/DL (ref 0.1–1)
BLD PROD TYP BPU: NORMAL
BLD PROD TYP BPU: NORMAL
BLOOD GROUP ANTIBODIES SERPL: NORMAL
BLOOD UNIT EXPIRATION DATE: NORMAL
BLOOD UNIT EXPIRATION DATE: NORMAL
BLOOD UNIT TYPE CODE: 9500
BLOOD UNIT TYPE CODE: 9500
BLOOD UNIT TYPE: NORMAL
BLOOD UNIT TYPE: NORMAL
BUN SERPL-MCNC: 54 MG/DL (ref 8–23)
CALCIUM SERPL-MCNC: 9.2 MG/DL (ref 8.7–10.5)
CHLORIDE SERPL-SCNC: 98 MMOL/L (ref 95–110)
CO2 SERPL-SCNC: 29 MMOL/L (ref 23–29)
CODING SYSTEM: NORMAL
CODING SYSTEM: NORMAL
CREAT SERPL-MCNC: 1.5 MG/DL (ref 0.5–1.4)
CROSSMATCH INTERPRETATION: NORMAL
CROSSMATCH INTERPRETATION: NORMAL
DIFFERENTIAL METHOD: ABNORMAL
DISPENSE STATUS: NORMAL
DISPENSE STATUS: NORMAL
EOSINOPHIL # BLD AUTO: 0.2 K/UL (ref 0–0.5)
EOSINOPHIL NFR BLD: 1 % (ref 0–8)
ERYTHROCYTE [DISTWIDTH] IN BLOOD BY AUTOMATED COUNT: 15.2 % (ref 11.5–14.5)
EST. GFR  (NO RACE VARIABLE): 34.6 ML/MIN/1.73 M^2
GIANT PLATELETS BLD QL SMEAR: PRESENT
GLUCOSE SERPL-MCNC: 175 MG/DL (ref 70–110)
HCT VFR BLD AUTO: 19.7 % (ref 37–48.5)
HGB BLD-MCNC: 5.9 G/DL (ref 12–16)
HYPOCHROMIA BLD QL SMEAR: ABNORMAL
IMM GRANULOCYTES # BLD AUTO: 0.27 K/UL (ref 0–0.04)
IMM GRANULOCYTES NFR BLD AUTO: 1.5 % (ref 0–0.5)
LYMPHOCYTES # BLD AUTO: 1 K/UL (ref 1–4.8)
LYMPHOCYTES NFR BLD: 5.5 % (ref 18–48)
MAGNESIUM SERPL-MCNC: 2.3 MG/DL (ref 1.6–2.6)
MCH RBC QN AUTO: 25.5 PG (ref 27–31)
MCHC RBC AUTO-ENTMCNC: 29.9 G/DL (ref 32–36)
MCV RBC AUTO: 85 FL (ref 82–98)
MONOCYTES # BLD AUTO: 1.1 K/UL (ref 0.3–1)
MONOCYTES NFR BLD: 5.8 % (ref 4–15)
NEUTROPHILS # BLD AUTO: 16 K/UL (ref 1.8–7.7)
NEUTROPHILS NFR BLD: 86.1 % (ref 38–73)
NRBC BLD-RTO: 0 /100 WBC
NUM UNITS TRANS PACKED RBC: NORMAL
NUM UNITS TRANS PACKED RBC: NORMAL
OVALOCYTES BLD QL SMEAR: ABNORMAL
PHOSPHATE SERPL-MCNC: 4.7 MG/DL (ref 2.7–4.5)
PLATELET # BLD AUTO: 443 K/UL (ref 150–450)
PLATELET BLD QL SMEAR: ABNORMAL
PMV BLD AUTO: 10.7 FL (ref 9.2–12.9)
POCT GLUCOSE: 129 MG/DL (ref 70–110)
POCT GLUCOSE: 135 MG/DL (ref 70–110)
POCT GLUCOSE: 135 MG/DL (ref 70–110)
POCT GLUCOSE: 136 MG/DL (ref 70–110)
POCT GLUCOSE: 156 MG/DL (ref 70–110)
POCT GLUCOSE: 174 MG/DL (ref 70–110)
POIKILOCYTOSIS BLD QL SMEAR: SLIGHT
POLYCHROMASIA BLD QL SMEAR: ABNORMAL
POTASSIUM SERPL-SCNC: 4.3 MMOL/L (ref 3.5–5.1)
PROT SERPL-MCNC: 6.9 G/DL (ref 6–8.4)
RBC # BLD AUTO: 2.31 M/UL (ref 4–5.4)
SODIUM SERPL-SCNC: 138 MMOL/L (ref 136–145)
TARGETS BLD QL SMEAR: ABNORMAL
WBC # BLD AUTO: 18.51 K/UL (ref 3.9–12.7)

## 2023-03-11 PROCEDURE — 25000003 PHARM REV CODE 250

## 2023-03-11 PROCEDURE — 99223 PR INITIAL HOSPITAL CARE,LEVL III: ICD-10-PCS | Mod: ,,, | Performed by: INTERNAL MEDICINE

## 2023-03-11 PROCEDURE — 25000003 PHARM REV CODE 250: Performed by: STUDENT IN AN ORGANIZED HEALTH CARE EDUCATION/TRAINING PROGRAM

## 2023-03-11 PROCEDURE — 25000003 PHARM REV CODE 250: Performed by: INTERNAL MEDICINE

## 2023-03-11 PROCEDURE — 99223 1ST HOSP IP/OBS HIGH 75: CPT | Mod: ,,, | Performed by: INTERNAL MEDICINE

## 2023-03-11 PROCEDURE — 63600175 PHARM REV CODE 636 W HCPCS: Performed by: INTERNAL MEDICINE

## 2023-03-11 PROCEDURE — 36415 COLL VENOUS BLD VENIPUNCTURE: CPT | Performed by: STUDENT IN AN ORGANIZED HEALTH CARE EDUCATION/TRAINING PROGRAM

## 2023-03-11 PROCEDURE — 85730 THROMBOPLASTIN TIME PARTIAL: CPT | Performed by: STUDENT IN AN ORGANIZED HEALTH CARE EDUCATION/TRAINING PROGRAM

## 2023-03-11 PROCEDURE — 99233 SBSQ HOSP IP/OBS HIGH 50: CPT | Mod: ,,, | Performed by: INTERNAL MEDICINE

## 2023-03-11 PROCEDURE — 27000207 HC ISOLATION

## 2023-03-11 PROCEDURE — 99222 PR INITIAL HOSPITAL CARE,LEVL II: ICD-10-PCS | Mod: GC,,, | Performed by: GENERAL ACUTE CARE HOSPITAL

## 2023-03-11 PROCEDURE — 85025 COMPLETE CBC W/AUTO DIFF WBC: CPT | Performed by: STUDENT IN AN ORGANIZED HEALTH CARE EDUCATION/TRAINING PROGRAM

## 2023-03-11 PROCEDURE — 94761 N-INVAS EAR/PLS OXIMETRY MLT: CPT

## 2023-03-11 PROCEDURE — 94664 DEMO&/EVAL PT USE INHALER: CPT

## 2023-03-11 PROCEDURE — 99233 PR SUBSEQUENT HOSPITAL CARE,LEVL III: ICD-10-PCS | Mod: ,,, | Performed by: INTERNAL MEDICINE

## 2023-03-11 PROCEDURE — 27000646 HC AEROBIKA DEVICE

## 2023-03-11 PROCEDURE — 94640 AIRWAY INHALATION TREATMENT: CPT

## 2023-03-11 PROCEDURE — 99900035 HC TECH TIME PER 15 MIN (STAT)

## 2023-03-11 PROCEDURE — 83735 ASSAY OF MAGNESIUM: CPT | Performed by: HOSPITALIST

## 2023-03-11 PROCEDURE — P9016 RBC LEUKOCYTES REDUCED: HCPCS | Performed by: INTERNAL MEDICINE

## 2023-03-11 PROCEDURE — 84100 ASSAY OF PHOSPHORUS: CPT | Performed by: HOSPITALIST

## 2023-03-11 PROCEDURE — 80053 COMPREHEN METABOLIC PANEL: CPT | Performed by: STUDENT IN AN ORGANIZED HEALTH CARE EDUCATION/TRAINING PROGRAM

## 2023-03-11 PROCEDURE — 20600001 HC STEP DOWN PRIVATE ROOM

## 2023-03-11 PROCEDURE — 99222 1ST HOSP IP/OBS MODERATE 55: CPT | Mod: GC,,, | Performed by: GENERAL ACUTE CARE HOSPITAL

## 2023-03-11 PROCEDURE — 27000221 HC OXYGEN, UP TO 24 HOURS

## 2023-03-11 PROCEDURE — 25000242 PHARM REV CODE 250 ALT 637 W/ HCPCS: Performed by: INTERNAL MEDICINE

## 2023-03-11 PROCEDURE — 25000003 PHARM REV CODE 250: Performed by: NURSE PRACTITIONER

## 2023-03-11 PROCEDURE — 63600175 PHARM REV CODE 636 W HCPCS: Performed by: NURSE PRACTITIONER

## 2023-03-11 RX ORDER — HYDROCODONE BITARTRATE AND ACETAMINOPHEN 500; 5 MG/1; MG/1
TABLET ORAL
Status: DISCONTINUED | OUTPATIENT
Start: 2023-03-11 | End: 2023-03-20 | Stop reason: HOSPADM

## 2023-03-11 RX ORDER — DEXTROSE 40 %
30 GEL (GRAM) ORAL
Status: DISCONTINUED | OUTPATIENT
Start: 2023-03-11 | End: 2023-03-20 | Stop reason: HOSPADM

## 2023-03-11 RX ORDER — LINEZOLID 600 MG/1
600 TABLET, FILM COATED ORAL EVERY 12 HOURS
Status: DISCONTINUED | OUTPATIENT
Start: 2023-03-11 | End: 2023-03-11

## 2023-03-11 RX ORDER — GABAPENTIN 100 MG/1
100 CAPSULE ORAL 3 TIMES DAILY
Status: DISCONTINUED | OUTPATIENT
Start: 2023-03-11 | End: 2023-03-12

## 2023-03-11 RX ORDER — DIVALPROEX SODIUM 250 MG/1
250 TABLET, DELAYED RELEASE ORAL EVERY EVENING
Status: DISCONTINUED | OUTPATIENT
Start: 2023-03-11 | End: 2023-03-12

## 2023-03-11 RX ORDER — GLUCAGON 1 MG
1 KIT INJECTION
Status: DISCONTINUED | OUTPATIENT
Start: 2023-03-11 | End: 2023-03-20 | Stop reason: HOSPADM

## 2023-03-11 RX ORDER — METHOCARBAMOL 500 MG/1
500 TABLET, FILM COATED ORAL 4 TIMES DAILY
Status: DISCONTINUED | OUTPATIENT
Start: 2023-03-11 | End: 2023-03-20 | Stop reason: HOSPADM

## 2023-03-11 RX ORDER — LINEZOLID 2 MG/ML
600 INJECTION, SOLUTION INTRAVENOUS
Status: DISCONTINUED | OUTPATIENT
Start: 2023-03-11 | End: 2023-03-11

## 2023-03-11 RX ORDER — BENZONATATE 100 MG/1
100 CAPSULE ORAL 3 TIMES DAILY PRN
Status: DISCONTINUED | OUTPATIENT
Start: 2023-03-11 | End: 2023-03-20 | Stop reason: HOSPADM

## 2023-03-11 RX ORDER — LINEZOLID 600 MG/1
600 TABLET, FILM COATED ORAL EVERY 12 HOURS
Status: DISPENSED | OUTPATIENT
Start: 2023-03-11 | End: 2023-03-15

## 2023-03-11 RX ORDER — INSULIN ASPART 100 [IU]/ML
0-5 INJECTION, SOLUTION INTRAVENOUS; SUBCUTANEOUS
Status: DISCONTINUED | OUTPATIENT
Start: 2023-03-12 | End: 2023-03-20 | Stop reason: HOSPADM

## 2023-03-11 RX ORDER — DEXTROSE 40 %
15 GEL (GRAM) ORAL
Status: DISCONTINUED | OUTPATIENT
Start: 2023-03-11 | End: 2023-03-17 | Stop reason: SDUPTHER

## 2023-03-11 RX ADMIN — TRAMADOL HYDROCHLORIDE 50 MG: 50 TABLET, COATED ORAL at 09:03

## 2023-03-11 RX ADMIN — METHOCARBAMOL 500 MG: 500 TABLET ORAL at 10:03

## 2023-03-11 RX ADMIN — DIVALPROEX SODIUM 250 MG: 250 TABLET, DELAYED RELEASE ORAL at 09:03

## 2023-03-11 RX ADMIN — APIXABAN 5 MG: 5 TABLET, FILM COATED ORAL at 08:03

## 2023-03-11 RX ADMIN — ASPIRIN 81 MG: 81 TABLET, COATED ORAL at 08:03

## 2023-03-11 RX ADMIN — INSULIN ASPART 7 UNITS: 100 INJECTION, SOLUTION INTRAVENOUS; SUBCUTANEOUS at 12:03

## 2023-03-11 RX ADMIN — Medication 6 MG: at 09:03

## 2023-03-11 RX ADMIN — ACETAMINOPHEN 1000 MG: 500 TABLET ORAL at 09:03

## 2023-03-11 RX ADMIN — METHOCARBAMOL 500 MG: 500 TABLET ORAL at 09:03

## 2023-03-11 RX ADMIN — GUAIFENESIN AND DEXTROMETHORPHAN HYDROBROMIDE 1 TABLET: 600; 30 TABLET, EXTENDED RELEASE ORAL at 08:03

## 2023-03-11 RX ADMIN — LINEZOLID 600 MG: 600 TABLET, FILM COATED ORAL at 09:03

## 2023-03-11 RX ADMIN — GABAPENTIN 100 MG: 100 CAPSULE ORAL at 05:03

## 2023-03-11 RX ADMIN — ACETAMINOPHEN 1000 MG: 500 TABLET ORAL at 06:03

## 2023-03-11 RX ADMIN — INSULIN ASPART 7 UNITS: 100 INJECTION, SOLUTION INTRAVENOUS; SUBCUTANEOUS at 05:03

## 2023-03-11 RX ADMIN — ATORVASTATIN CALCIUM 40 MG: 40 TABLET, FILM COATED ORAL at 09:03

## 2023-03-11 RX ADMIN — INSULIN DETEMIR 20 UNITS: 100 INJECTION, SOLUTION SUBCUTANEOUS at 09:03

## 2023-03-11 RX ADMIN — MUPIROCIN: 20 OINTMENT TOPICAL at 08:03

## 2023-03-11 RX ADMIN — CLOPIDOGREL BISULFATE 75 MG: 75 TABLET ORAL at 08:03

## 2023-03-11 RX ADMIN — INSULIN ASPART 7 UNITS: 100 INJECTION, SOLUTION INTRAVENOUS; SUBCUTANEOUS at 08:03

## 2023-03-11 RX ADMIN — MUPIROCIN: 20 OINTMENT TOPICAL at 09:03

## 2023-03-11 RX ADMIN — METHOCARBAMOL 500 MG: 500 TABLET ORAL at 01:03

## 2023-03-11 RX ADMIN — ACETAMINOPHEN 1000 MG: 500 TABLET ORAL at 01:03

## 2023-03-11 RX ADMIN — GABAPENTIN 100 MG: 100 CAPSULE ORAL at 09:03

## 2023-03-11 RX ADMIN — LEVALBUTEROL 1.25 MG: 1.25 SOLUTION, CONCENTRATE RESPIRATORY (INHALATION) at 08:03

## 2023-03-11 RX ADMIN — AMLODIPINE BESYLATE 10 MG: 10 TABLET ORAL at 08:03

## 2023-03-11 RX ADMIN — METHOCARBAMOL 500 MG: 500 TABLET ORAL at 05:03

## 2023-03-11 RX ADMIN — LEVALBUTEROL 1.25 MG: 1.25 SOLUTION, CONCENTRATE RESPIRATORY (INHALATION) at 02:03

## 2023-03-11 RX ADMIN — LEVALBUTEROL 1.25 MG: 1.25 SOLUTION, CONCENTRATE RESPIRATORY (INHALATION) at 09:03

## 2023-03-11 RX ADMIN — INSULIN ASPART 2 UNITS: 100 INJECTION, SOLUTION INTRAVENOUS; SUBCUTANEOUS at 08:03

## 2023-03-11 RX ADMIN — FUROSEMIDE 60 MG: 10 INJECTION, SOLUTION INTRAMUSCULAR; INTRAVENOUS at 08:03

## 2023-03-11 RX ADMIN — TRAMADOL HYDROCHLORIDE 50 MG: 50 TABLET, COATED ORAL at 08:03

## 2023-03-11 RX ADMIN — GUAIFENESIN AND DEXTROMETHORPHAN HYDROBROMIDE 1 TABLET: 600; 30 TABLET, EXTENDED RELEASE ORAL at 09:03

## 2023-03-11 RX ADMIN — INSULIN ASPART 2 UNITS: 100 INJECTION, SOLUTION INTRAVENOUS; SUBCUTANEOUS at 05:03

## 2023-03-11 NOTE — ASSESSMENT & PLAN NOTE
-Vascular surgery consult. apprec recs  -- s/p fem fem bypass on 3/2/2023  -- s/p right TMA on 3/4/2023.   --No concern for surgical incision infection at this time.  -- low threshold to escalate antibiotics. If her graft becomes infected and requires antibiotics, she has no other options left for revascularizations. ID following  -- Q4H neurovasc checks  -- ASA/plavix/Statin/Eliquis. Hepatin gtt in replace of eliquis for now due to worsening anemia  -- PT/OT  --Recommend PRBC transfusion  --Consider GI eval or imaging   --UA cultures negative, blood cultures no growth to date, wound cultures NGTD, cdiff pending.  -Podiatry consulted. apprec recs  --Dressing changes by nursing.  --RLE WB to heel in surgical shoe for transfers or short distances.   --Elevate RLE at all times while in bed or chair.  -- Continuing to monitor right TMA site. If foot worsens or no other source is found then may consider opening of incision and exploration of surgical wound   - outpatient podiatry wound care instructions when patient medically ready for discharge: Please order ambulatory referral to podiatry for outpatient followup. RLE WB to heel in surgical shoe for transfers or short distances. HH/facility dressing changes R foot 3x/week: cleanse incision with saline, apply betadine soaked adaptic, apply gauze, wrap with cast padding and coban.

## 2023-03-11 NOTE — NURSING
Communicated w/ Dr. Coello via secure chat in regards to update on hold up for blood admin; he verbalized understanding and has also ordered another unit of blood so patient will receive a total of 2 units of blood due to her decreasing HgB. Will continue to monitor patient.

## 2023-03-11 NOTE — SUBJECTIVE & OBJECTIVE
Medications:  Continuous Infusions:  Scheduled Meds:   acetaminophen  1,000 mg Oral Q8H    amLODIPine  10 mg Oral Daily    apixaban  5 mg Oral BID    aspirin  81 mg Oral Daily    atorvastatin  40 mg Oral QHS    clopidogreL  75 mg Oral Daily    dextromethorphan-guaiFENesin  mg  1 tablet Oral BID    furosemide (LASIX) injection  60 mg Intravenous BID loop    insulin aspart U-100  7 Units Subcutaneous TIDWM    insulin detemir U-100  20 Units Subcutaneous QHS    levalbuterol  1.25 mg Nebulization Q8H WA    melatonin  6 mg Oral Nightly    mupirocin   Nasal BID     PRN Meds:sodium chloride, dextrose 10%, dextrose 10%, dextrose, dextrose, glucagon (human recombinant), glucagon (human recombinant), insulin aspart U-100, levalbuterol, naloxone, ondansetron, prochlorperazine, simethicone, sodium chloride 0.9%, traMADoL     Objective:     Vital Signs (Most Recent):  Temp: 98.5 °F (36.9 °C) (03/11/23 0752)  Pulse: 81 (03/11/23 0833)  Resp: 18 (03/11/23 0833)  BP: (!) 122/51 (03/11/23 0752)  SpO2: 96 % (03/11/23 0833)   Vital Signs (24h Range):  Temp:  [98.1 °F (36.7 °C)-98.9 °F (37.2 °C)] 98.5 °F (36.9 °C)  Pulse:  [74-92] 81  Resp:  [17-19] 18  SpO2:  [95 %-100 %] 96 %  BP: (113-135)/(51-66) 122/51       Physical Exam  Vitals reviewed.   Constitutional:       Appearance: Normal appearance.   HENT:      Head: Normocephalic.      Mouth/Throat:      Mouth: Mucous membranes are moist.   Eyes:      Pupils: Pupils are equal, round, and reactive to light.   Cardiovascular:      Rate and Rhythm: Normal rate and regular rhythm.      Comments: R biphasic DP and PT   L monophasic DP and biphasic PT  Pulmonary:      Effort: Pulmonary effort is normal.      Breath sounds: Wheezing present.   Abdominal:      General: Abdomen is flat.      Palpations: Abdomen is soft.   Musculoskeletal:         General: Normal range of motion.      Cervical back: Normal range of motion.   Skin:     General: Skin is warm and dry.      Capillary  Refill: Capillary refill takes less than 2 seconds.      Comments: R thigh incision with dressing - c/d/i  L groin dressing- c/d/i  R foot s/p TMA, no purulence or erythema    Neurological:      General: No focal deficit present.      Mental Status: She is alert.   Psychiatric:         Mood and Affect: Mood normal.     Significant Labs:  CBC:   Recent Labs   Lab 03/11/23  0425   WBC 18.51*   RBC 2.31*   HGB 5.9*   HCT 19.7*      MCV 85   MCH 25.5*   MCHC 29.9*     CMP:   Recent Labs   Lab 03/11/23  0425   *   CALCIUM 9.2   ALBUMIN 2.0*   PROT 6.9      K 4.3   CO2 29   CL 98   BUN 54*   CREATININE 1.5*   ALKPHOS 51*   ALT 17   AST 24   BILITOT 0.6       Significant Diagnostics:  I have reviewed all pertinent imaging results/findings within the past 24 hours.

## 2023-03-11 NOTE — PROGRESS NOTES
[facility] - [facility]  Vascular Surgery  Progress Note    Patient Name: Nelsy Marino  MRN: [id number]  Admission Date: 2/27/2023  Primary Care Provider: Juan F Garay    Subjective:     Interval History: NAEON. Afebrile overnight. WBC keeps increasing. Hgb 5.9. Renal function decreasing. Patient denies cp, sob, fever, chills, pain only in feet. Incisions CDI - right TMA incision packed per podiatry. Does not look infected. 4 BM overnight.    Post-Op Info:  Procedure(s) (LRB):  AMPUTATION, FOOT, TRANSMETATARSAL (Right)   7 Days Post-Op       Medications:  Continuous Infusions:  Scheduled Meds:   acetaminophen  1,000 mg Oral Q8H    amLODIPine  10 mg Oral Daily    apixaban  5 mg Oral BID    aspirin  81 mg Oral Daily    atorvastatin  40 mg Oral QHS    clopidogreL  75 mg Oral Daily    dextromethorphan-guaiFENesin  mg  1 tablet Oral BID    furosemide (LASIX) injection  60 mg Intravenous BID loop    insulin aspart U-100  7 Units Subcutaneous TIDWM    insulin detemir U-100  20 Units Subcutaneous QHS    levalbuterol  1.25 mg Nebulization Q8H WA    melatonin  6 mg Oral Nightly    mupirocin   Nasal BID     PRN Meds:sodium chloride, dextrose 10%, dextrose 10%, dextrose, dextrose, glucagon (human recombinant), glucagon (human recombinant), insulin aspart U-100, levalbuterol, naloxone, ondansetron, prochlorperazine, simethicone, sodium chloride 0.9%, traMADoL     Objective:     Vital Signs (Most Recent):  Temp: 98.5 °F (36.9 °C) (03/11/23 0752)  Pulse: 81 (03/11/23 0833)  Resp: 18 (03/11/23 0833)  BP: (!) 122/51 (03/11/23 0752)  SpO2: 96 % (03/11/23 0833)   Vital Signs (24h Range):  Temp:  [98.1 °F (36.7 °C)-98.9 °F (37.2 °C)] 98.5 °F (36.9 °C)  Pulse:  [74-92] 81  Resp:  [17-19] 18  SpO2:  [95 %-100 %] 96 %  BP: (113-135)/(51-66) 122/51       Physical Exam  Vitals reviewed.   Constitutional:       Appearance: Normal appearance.   HENT:      Head: Normocephalic.      Mouth/Throat:      Mouth: Mucous membranes are moist.    Eyes:      Pupils: Pupils are equal, round, and reactive to light.   Cardiovascular:      Rate and Rhythm: Normal rate and regular rhythm.      Comments: R biphasic DP and PT   L monophasic DP and biphasic PT  Pulmonary:      Effort: Pulmonary effort is normal.      Breath sounds: Wheezing present.   Abdominal:      General: Abdomen is flat.      Palpations: Abdomen is soft.   Musculoskeletal:         General: Normal range of motion.      Cervical back: Normal range of motion.   Skin:     General: Skin is warm and dry.      Capillary Refill: Capillary refill takes less than 2 seconds.      Comments: R thigh incision with dressing - c/d/i  L groin dressing- c/d/i  R foot s/p TMA, no purulence or erythema    Neurological:      General: No focal deficit present.      Mental Status: She is alert.   Psychiatric:         Mood and Affect: Mood normal.     Significant Labs:  CBC:   Recent Labs   Lab 03/11/23 0425   WBC 18.51*   RBC 2.31*   HGB 5.9*   HCT 19.7*      MCV 85   MCH 25.5*   MCHC 29.9*     CMP:   Recent Labs   Lab 03/11/23  0425   *   CALCIUM 9.2   ALBUMIN 2.0*   PROT 6.9      K 4.3   CO2 29   CL 98   BUN 54*   CREATININE 1.5*   ALKPHOS 51*   ALT 17   AST 24   BILITOT 0.6       Significant Diagnostics:  I have reviewed all pertinent imaging results/findings within the past 24 hours.    Assessment/Plan:     * Critical limb ischemia with history of revascularization of same extremity  Nelsy Marino is a 82 y.o. lady with severe PAD    -s/p TMA 3/4  -No concern for surgical incision infection at this time.  -Recommend 2 units PRBC transfusion  -Recommend Abx   -Consider GI eval or imaging   -UA cultures negative, blood cultures no growth to date, wound cultures NGTD, cdiff pending.  -please have a low threshold to escalate antibiotics.   -Q4H neurovasc checks  -Continue diet  -ASA/plavix/Statin/Eliquis   -PT/OT  -Podiatry following  -remainder of care per primary    Dispo: SNF  Follow up in  two weeks with Bettina, ARCHIE/TBI, US Graft        Jomar Maciel MD  Vascular Surgery  [facility]

## 2023-03-11 NOTE — PLAN OF CARE
Problem: Adult Inpatient Plan of Care  Goal: Plan of Care Review  Outcome: Ongoing, Progressing  Goal: Absence of Hospital-Acquired Illness or Injury  Outcome: Ongoing, Progressing  Intervention: Identify and Manage Fall Risk  Flowsheets (Taken 3/11/2023 1404)  Safety Promotion/Fall Prevention:   assistive device/personal item within reach   bedside commode chair   Fall Risk reviewed with patient/family   medications reviewed   nonskid shoes/socks when out of bed   instructed to call staff for mobility  Intervention: Prevent Skin Injury  Flowsheets (Taken 3/11/2023 1407)  Body Position: position changed independently  Skin Protection:   adhesive use limited   skin sealant/moisture barrier applied   tubing/devices free from skin contact

## 2023-03-11 NOTE — SUBJECTIVE & OBJECTIVE
Subjective:     Interval History: Vitals stable. No new pedal complaints, ongoing foot pain. Dressings clean dry, intact. WBC continue to rise, now 18.51.       Follow-up For: Procedure(s) (LRB):  AMPUTATION, FOOT, TRANSMETATARSAL (Right)    Post-Operative Day: 7 Days Post-Op    Scheduled Meds:   acetaminophen  1,000 mg Oral Q8H    amLODIPine  10 mg Oral Daily    apixaban  5 mg Oral BID    aspirin  81 mg Oral Daily    atorvastatin  40 mg Oral QHS    clopidogreL  75 mg Oral Daily    dextromethorphan-guaiFENesin  mg  1 tablet Oral BID    furosemide (LASIX) injection  60 mg Intravenous BID loop    insulin aspart U-100  7 Units Subcutaneous TIDWM    insulin detemir U-100  20 Units Subcutaneous QHS    levalbuterol  1.25 mg Nebulization Q8H WA    melatonin  6 mg Oral Nightly    methocarbamoL  500 mg Oral QID    mupirocin   Nasal BID     Continuous Infusions:  PRN Meds:sodium chloride, dextrose 10%, dextrose 10%, dextrose, dextrose, glucagon (human recombinant), glucagon (human recombinant), insulin aspart U-100, levalbuterol, naloxone, ondansetron, prochlorperazine, simethicone, sodium chloride 0.9%, traMADoL    Review of Systems   Constitutional:  Negative for chills and fever.   HENT:  Negative for hearing loss and trouble swallowing.    Eyes:  Negative for discharge and visual disturbance.   Respiratory:  Negative for chest tightness and shortness of breath.    Cardiovascular:  Negative for chest pain and palpitations.   Gastrointestinal:  Negative for abdominal distention, abdominal pain, nausea and vomiting.   Genitourinary:  Negative for difficulty urinating and hematuria.   Musculoskeletal:  Negative for arthralgias and back pain.   Skin:  Positive for color change and wound. Negative for rash.   Neurological:  Negative for dizziness and headaches.   Objective:     Vital Signs (Most Recent):  Temp: 98.5 °F (36.9 °C) (03/11/23 0752)  Pulse: 81 (03/11/23 0833)  Resp: 18 (03/11/23 0833)  BP: (!) 122/51  (03/11/23 0752)  SpO2: 96 % (03/11/23 0833)   Vital Signs (24h Range):  Temp:  [98.1 °F (36.7 °C)-98.9 °F (37.2 °C)] 98.5 °F (36.9 °C)  Pulse:  [74-92] 81  Resp:  [17-18] 18  SpO2:  [95 %-100 %] 96 %  BP: (113-135)/(51-66) 122/51     Weight: 73.5 kg (162 lb 0.6 oz)  Body mass index is 27.81 kg/m².    Foot Exam    General  Orientation: alert and oriented to person, place, and time   Affect: appropriate       Right Foot/Ankle     Inspection and Palpation  Tenderness: (TMA site, mild)  Swelling: (TMA site, mild)  Skin Exam: drainage, maceration and ulcer (incision);     Neurovascular  Dorsalis pedis: doppler  Posterior tibial: doppler  Saphenous nerve sensation: diminished  Tibial nerve sensation: diminished  Superficial peroneal nerve sensation: diminished  Deep peroneal nerve sensation: diminished  Sural nerve sensation: diminished    Comments  S/p right tma. Sutures are intact, 2 sutures previously removed at site of drainage. Mild serosanguinous drainage from central area of incision, with periwound maceration. Moderate tenderness throughout surgical site.     Left Foot/Ankle      Inspection and Palpation  Tenderness: none   Swelling: none   Skin Exam: skin intact;     Neurovascular  Dorsalis pedis: absent  Posterior tibial: doppler  Saphenous nerve sensation: diminished  Tibial nerve sensation: diminished  Superficial peroneal nerve sensation: diminished  Deep peroneal nerve sensation: diminished  Sural nerve sensation: diminished    Comments  No open wounds noted            Laboratory:  Blood Cultures:   Recent Labs   Lab 03/10/23  0942 03/10/23  0943   LABBLOO No Growth to date No Growth to date     CBC:   Recent Labs   Lab 03/11/23 0425   WBC 18.51*   RBC 2.31*   HGB 5.9*   HCT 19.7*      MCV 85   MCH 25.5*   MCHC 29.9*     CMP:   Recent Labs   Lab 03/11/23 0425   *   CALCIUM 9.2   ALBUMIN 2.0*   PROT 6.9      K 4.3   CO2 29   CL 98   BUN 54*   CREATININE 1.5*   ALKPHOS 51*   ALT 17   AST  24   BILITOT 0.6     CRP:   Recent Labs   Lab 03/10/23  0942   .0*     ESR:   Recent Labs   Lab 03/10/23  0942   SEDRATE 64*     Microbiology Results (last 7 days)       Procedure Component Value Units Date/Time    Culture, Respiratory with Gram Stain [[id number]]     Order Status: No result Specimen: Respiratory     Aerobic culture [[id number]] Collected: 03/10/23 1340    Order Status: Completed Specimen: Wound from Foot, Right Updated: 03/11/23 0758     Aerobic Bacterial Culture No growth    Narrative:      Right foot tma site wound    Blood culture [[id number]] Collected: 03/08/23 2325    Order Status: Completed Specimen: Blood from Peripheral, Lower Arm, Right Updated: 03/11/23 0613     Blood Culture, Routine No Growth to date      No Growth to date      No Growth to date    Narrative:      Right Peripheral    Blood culture [[id number]] Collected: 03/08/23 2325    Order Status: Completed Specimen: Blood from Peripheral, Lower Arm, Left Updated: 03/11/23 0613     Blood Culture, Routine No Growth to date      No Growth to date      No Growth to date    Narrative:      Left Peripheral    Gram stain [[id number]] Collected: 03/10/23 1340    Order Status: Completed Specimen: Wound from Foot, Right Updated: 03/10/23 1924     Gram Stain Result Rare WBC's      No organisms seen    Narrative:      Right foot tma site wound    Clostridium difficile EIA [[id number]]     Order Status: No result Specimen: Stool     Blood culture [[id number]] Collected: 03/10/23 0942    Order Status: Completed Specimen: Blood from Antecubital, Right Arm Updated: 03/10/23 1715     Blood Culture, Routine No Growth to date    Blood culture [[id number]] Collected: 03/10/23 0943    Order Status: Completed Specimen: Blood from Peripheral, Right Hand Updated: 03/10/23 1715     Blood Culture, Routine No Growth to date    Culture, Anaerobe [[id number]] Collected: 03/10/23 1340    Order Status: Sent Specimen: Wound from Foot, Right Updated: 03/10/23 1345     Culture, Anaerobe [[id number]] Collected: 03/04/23 1141    Order Status: Completed Specimen: Incision site from Foot, Right Updated: 03/09/23 1435     Anaerobic Culture Culture in progress    Narrative:      3rd Metatarsal    Fungus culture [[id number]] Collected: 03/04/23 1141    Order Status: Completed Specimen: Incision site from Foot, Right Updated: 03/09/23 1018     Fungus (Mycology) Culture Culture in progress    Narrative:      3rd Metatarsal    Aerobic culture [[id number]] Collected: 03/04/23 1141    Order Status: Completed Specimen: Incision site from Foot, Right Updated: 03/07/23 0844     Aerobic Bacterial Culture No growth    Narrative:      3rd Metatarsal    Gram stain [[id number]] Collected: 03/04/23 1141    Order Status: Completed Specimen: Incision site from Foot, Right Updated: 03/04/23 1418     Gram Stain Result No WBC's      No organisms seen    Narrative:      3rd Metatarsal    AFB Culture & Smear [[id number]] Collected: 03/04/23 1150    Order Status: Sent Specimen: Incision site from Foot, Right Updated: 03/04/23 1150          Specimen (24h ago, onward)      None

## 2023-03-11 NOTE — PROGRESS NOTES
[facility] Medicine  Progress Note    Patient Name: Nelsy Marino  MRN: [id number]  Patient Class: IP- Inpatient   Admission Date: 2/27/2023  Length of Stay: 12 days  Attending Physician: Tanner Coello MD  Primary Care Provider: Juan F Garay        Subjective:     Principal Problem:Critical limb ischemia with history of revascularization of same extremity        HPI:  This is an 82-year-old female with a past medical history of severe PAD, hypertension, type 2 diabetes, history of DVT who presents with right foot pain.    Patient reports worsening right foot pain, swelling and discoloration that started after she had an ingrown toenail removed by her podiatrist.  She reports that her discoloration started since 2/7. Of note, the patient underwent an angiogram on 02/23 by vascular surgery. In the ED, she was tachycardic (112), and hypertensive.  Labs showed microcytic anemia (10.2), elevated sed rate (112), elevated CRP (56.5), elevated lactic acid (2.5).  Foot x-ray showed DJD and spurs on the calcaneus with no acute process.  Vascular surgery was consulted and recommended continuing antibiotics and evaluating in the morning.  She was given morphine 4 mg IV, vancomycin, Zosyn and was admitted for further management.      Overview/Hospital Course:   82-year-old female with a past medical history of severe PAD, hypertension, type 2 diabetes, history of DVT admitted on 02/27/2023 for gangrene of right toes. Of note, patient recent underwent LE angiogram by Dr. Tran on 02/23/23. Per OP report, was able to get successful revascularization / resolution of stenosis. XR right foot with no fracture, dislocation, bone destruction, or foreign body seen.  There is DJD and spurs on the calcaneus.  Was started on IV Zosyn and vancomycin on admit. Blood  cx with NGTD. On 02/28, patient with hypoglycemia but was asymptomatic. Improved with glucogen. Likely due to NPO status and was still receiving  insulin.  Cardiology consulted for preoperative clearance.  Recommended stress test.  Normal myocardial perfusion scan.  Cardiology deem patient at acceptable cardiac risk for vasc surgery/anesthesia.  Vascular surgery consulted and patient underwent fem fem bypass on 3/2/2023. Podiatry also consulted and patient underwent right TMA on 3/4/23.     Stepped down to Hospital Medicine service. O2 requirement steadily improving while on diuresis.  Patient notes significant posterior ankle pain. Increased tramadol and ordered robaxin. Good urine output. Patient continues to note sleep disturbances mainly due to BLE pain. Ordering transfusion for Hb 6.4. WBC continues to rise from 13 to 15. Per vascular surgery, continuing wound care for right TMA and no evidence of active infection at the surgical site. Per ID, continuing supportive care and diuresis and discontinuing IV zosyn. Also will obtain respiratory cultures, order C diff panel and followup CT chest.            Interval History: Patient sitting in chair, no acute distress. Family at bedside. O2 requirement steadily improving while on diuresis.  Patient notes significant posterior ankle pain. Increased tramadol and ordered robaxin. Good urine output. Patient continues to note sleep disturbances mainly due to BLE pain. Ordering transfusion for Hb 6.4. WBC continues to rise from 13 to 15. Per vascular surgery, continuing wound care for right TMA and no evidence of active infection at the surgical site. Per ID, continuing supportive care and diuresis and discontinuing IV zosyn. Also will obtain respiratory cultures, order C diff panel and followup CT chest.            Review of Systems   Constitutional:  Positive for activity change and fatigue. Negative for chills and fever.   HENT:  Negative for congestion.    Eyes:  Negative for visual disturbance.   Respiratory:  Positive for cough and shortness of breath.    Cardiovascular:  Positive for leg swelling. Negative for  chest pain.   Gastrointestinal:  Negative for abdominal distention, abdominal pain, nausea and vomiting.   Genitourinary:  Negative for dysuria.   Musculoskeletal:  Positive for arthralgias (right foot) and myalgias (right foot). Negative for back pain.   Skin:  Negative for rash and wound.   Neurological:  Positive for weakness. Negative for dizziness.   Psychiatric/Behavioral:  Negative for confusion.    Objective:     Vital Signs (Most Recent):  Temp: 98 °F (36.7 °C) (03/11/23 1054)  Pulse: 99 (03/11/23 1054)  Resp: 18 (03/11/23 1054)  BP: 119/86 (03/11/23 1054)  SpO2: 98 % (03/11/23 1054)   Vital Signs (24h Range):  Temp:  [98 °F (36.7 °C)-98.9 °F (37.2 °C)] 98 °F (36.7 °C)  Pulse:  [74-99] 99  Resp:  [17-18] 18  SpO2:  [95 %-100 %] 98 %  BP: (113-135)/(51-86) 119/86     Weight: 73.5 kg (162 lb 0.6 oz)  Body mass index is 27.81 kg/m².  No intake or output data in the 24 hours ending 03/11/23 1110     Physical Exam  Constitutional:       Appearance: Normal appearance.   HENT:      Head: Normocephalic.      Mouth/Throat:      Mouth: Mucous membranes are moist.   Eyes:      Extraocular Movements: Extraocular movements intact.      Pupils: Pupils are equal, round, and reactive to light.   Neck:      Comments: JVD absent  Cardiovascular:      Rate and Rhythm: Normal rate and regular rhythm.      Pulses: Normal pulses.      Heart sounds: Normal heart sounds.   Pulmonary:      Effort: Respiratory distress present.      Breath sounds: Normal breath sounds.   Abdominal:      General: Bowel sounds are normal. There is no distension.      Palpations: Abdomen is soft.      Tenderness: There is no abdominal tenderness.   Musculoskeletal:         General: Normal range of motion.      Cervical back: Neck supple.      Right lower leg: Edema present.      Left lower leg: Edema present.      Comments: Right TMA with dressing c/d/I and surgical shoe in place   Neurological:      General: No focal deficit present.      Mental  Status: She is alert.   Psychiatric:         Mood and Affect: Mood normal.         Cognition and Memory: Cognition is impaired. Memory is impaired.       Significant Labs: A1C:   Recent Labs   Lab 02/28/23  0549   HGBA1C 8.2*       Blood Culture:   Recent Labs   Lab 03/10/23  0942 03/10/23  0943   LABBLOO No Growth to date No Growth to date       CBC:   Recent Labs   Lab 03/10/23  0413 03/11/23  0425   WBC 15.46* 18.51*   HGB 6.4* 5.9*   HCT 20.6* 19.7*    443       CMP:   Recent Labs   Lab 03/10/23  0413 03/11/23  0425    138   K 3.9 4.3   CL 99 98   CO2 29 29   * 175*   BUN 46* 54*   CREATININE 1.4 1.5*   CALCIUM 8.8 9.2   PROT 6.7 6.9   ALBUMIN 2.0* 2.0*   BILITOT 0.6 0.6   ALKPHOS 64 51*   AST 17 24   ALT 11 17   ANIONGAP 8 11       Coagulation:   Recent Labs   Lab 03/11/23  0425   APTT 35.6*       Lactic Acid:   No results for input(s): LACTATE in the last 48 hours.    Magnesium:   Recent Labs   Lab 03/10/23  0413 03/11/23  0425   MG 2.2 2.3       POCT Glucose:   Recent Labs   Lab 03/10/23  2220 03/11/23  0818 03/11/23  1053   POCTGLUCOSE 194* 174* 129*       Troponin:   No results for input(s): TROPONINI, TROPONINIHS in the last 48 hours.    TSH: No results for input(s): TSH in the last 4320 hours.  Urine Studies:   Recent Labs   Lab 03/09/23  1427   COLORU Yellow   APPEARANCEUA Clear   PHUR 5.0   SPECGRAV 1.015   PROTEINUA Negative   GLUCUA Negative   KETONESU Negative   BILIRUBINUA Negative   OCCULTUA Negative   NITRITE Negative   LEUKOCYTESUR Negative         Significant Imaging: I have reviewed all pertinent imaging results/findings within the past 24 hours.      Assessment/Plan:      * Critical limb ischemia with history of revascularization of same extremity  -Vascular surgery consult. apprec recs  -- s/p fem fem bypass on 3/2/2023  -- s/p right TMA on 3/4/2023.   --No concern for surgical incision infection at this time.  -- low threshold to escalate antibiotics. If her graft becomes  "infected and requires antibiotics, she has no other options left for revascularizations. ID following  -- Q4H neurovasc checks  -- ASA/plavix/Statin/Eliquis. Hepatin gtt in replace of eliquis for now due to worsening anemia  -- PT/OT  --Recommend PRBC transfusion  --Consider GI eval or imaging   --UA cultures negative, blood cultures no growth to date, wound cultures NGTD, cdiff pending.  -Podiatry consulted. apprec recs  --Dressing changes by nursing.  --RLE WB to heel in surgical shoe for transfers or short distances.   --Elevate RLE at all times while in bed or chair.  -- Continuing to monitor right TMA site. If foot worsens or no other source is found then may consider opening of incision and exploration of surgical wound   - outpatient podiatry wound care instructions when patient medically ready for discharge: Please order ambulatory referral to podiatry for outpatient followup. RLE WB to heel in surgical shoe for transfers or short distances. HH/facility dressing changes R foot 3x/week: cleanse incision with saline, apply betadine soaked adaptic, apply gauze, wrap with cast padding and coban.       Leukocytosis  - see "sepsis"      S/P transmetatarsal amputation of foot, right  - stable  - appreciated vascular surgery and podiatry recs  -- s/p TMA 3/4  -- low threshold to escalate antibiotics. If her graft becomes infected and requires antibiotics, she has no other options left for revascularizations  -- Q4H neurovasc checks  -- ASA/plavix/Statin/Eliquis   -- PT/OT  --Dressing changes by nursing.  --RLE WB to heel in surgical shoe for transfers or short distances.   --Elevate RLE at all times while in bed or chair.  -- DC Instructions: Please order ambulatory referral to podiatry for outpatient followup. RLE WB to heel in surgical shoe for transfers or short distances. HH/facility dressing changes R foot 3x/week: cleanse incision with saline, apply betadine soaked adaptic, apply gauze, wrap with cast padding " and coban.   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 S/p right TMA on 3/4  - fever 100.5 on 3/9  - WBC risin --> 15 --> 18  - procal slightly elevated at 0.65  - ESR 64,   - per vascular surgery, right TMA does not appear infected  - per podiatry, if no other infectious source identified, will consider opening of incision and exploration of surgical wound.   - ID consulted. apprec recs  -- discontinue IV zosyn   -- continue supportive care  -- blood culture NGTD  -- followup sputum culture, C diff, RIP  -- followup CT C/A/P        History of DVT (deep vein thrombosis)  Not on AC outpatient.    - heparin gtt in replace of eliquis for now until Hb stable and no additional surgical intervention indicated    Acute hypoxemic respiratory failure  Patient with Hypercapnic and Hypoxic Respiratory failure which is Acute on chronic.  she is not on home oxygen. Supplemental oxygen was provided and noted-  .   Signs/symptoms of respiratory failure include- tachypnea, increased work of breathing and lethargy. Contributing diagnoses includes - CHF, Obesity Hypoventilation and Pneumonia Labs and images were reviewed. Patient Has recent ABG, which has been reviewed. Will treat underlying causes and adjust management of respiratory failure.    PLAN:  - increased from 2 L NC to 12 L NC on 3/78  - now weaned to 5 L NC  - CXR showed pulmonary edema with no consolidation  - switched to xoponex due to tremors on duonebs  - incentive spirometry   - mucinex and prn tessalon perles  - chest PT and acapella   - OOB to chair  - IMPROVING        Type 2 diabetes mellitus  Stable from hypoglycemia standpoint; continue levemir and LDSSI      Essential  hypertension  -Continue home medications:  Amlodipine 10 mg daily, hydrochlorothiazide 25 mg daily, losartan 100 mg daily        VTE Risk Mitigation (From admission, onward)         Ordered     apixaban tablet 5 mg  2 times daily         03/06/23 0852     Reason for No Pharmacological VTE Prophylaxis  Once        Question:  Reasons:  Answer:  IV Heparin w/in 24 hrs. Pre or Post-Op    03/02/23 1918     IP VTE HIGH RISK PATIENT  Once         03/02/23 1918     Place sequential compression device  Until discontinued         02/27/23 2200                Discharge Planning   JUAN ALBERTO: 3/15/2023     Code Status: Full Code   Is the patient medically ready for discharge?: No    Reason for patient still in hospital (select all that apply): Patient unstable, Patient trending condition, Laboratory test, Treatment, Imaging, Consult recommendations, PT / OT recommendations and Pending disposition  Discharge Plan A: Long-term acute care facility (LTAC)   Discharge Delays: None known at this time        Time spent in care of patient: > 35 minutes         Tanner Coello MD  Department of Hospital Medicine   [facility]

## 2023-03-11 NOTE — PLAN OF CARE
Problem: Adult Inpatient Plan of Care  Goal: Optimal Comfort and Wellbeing  Outcome: Ongoing, Progressing     Problem: Skin Injury Risk Increased  Goal: Skin Health and Integrity  Outcome: Ongoing, Progressing     Problem: Infection  Goal: Absence of Infection Signs and Symptoms  Outcome: Ongoing, Progressing     Problem: Bleeding (Sepsis/Septic Shock)  Goal: Absence of Bleeding  Outcome: Ongoing, Progressing

## 2023-03-11 NOTE — NURSING
Patient is stable no acute distress noted. Called blood bank to check if blood is ready. Informed patient has an antibody blood is being prepared will call unit when ready. Will continue to monitor.

## 2023-03-11 NOTE — ASSESSMENT & PLAN NOTE
Not on AC outpatient.    - heparin gtt in replace of eliquis for now until Hb stable and no additional surgical intervention indicated

## 2023-03-11 NOTE — ASSESSMENT & PLAN NOTE
-Right foot appears to have wet gangrene on exam. S/p right TMA on 3/4  - fever 100.5 on 3/9  - WBC risin --> 15 --> 18  - procal slightly elevated at 0.65  - ESR 64,   - per vascular surgery, right TMA does not appear infected  - per podiatry, if no other infectious source identified, will consider opening of incision and exploration of surgical wound.   - ID consulted. apprec recs  -- discontinue IV zosyn   -- continue supportive care  -- blood culture NGTD  -- followup sputum culture, C diff, RIP  -- followup CT C/A/P

## 2023-03-11 NOTE — ASSESSMENT & PLAN NOTE
82 year old with fever and slightly elevated wbc count.  Patient also has some new onset hypoxia.  She had recent blood transfusions on march 2 and march 4. Appears she started requiring oxygen around that time.  Differential includes drug fever vs surgical wound infection of the foot vs transfusion related lung injury.  C diff is also possible as patient is reporting occasional loose stools.      Recommendations    1. Follow up on blood cultures.    2. Send stool for C diff.    3. Discontinue zosyn IV.

## 2023-03-11 NOTE — PROGRESS NOTES
[facility]  Infectious Disease  Progress Note    Patient Name: Nelsy Marino  MRN: [id number]  Admission Date: 2/27/2023  Length of Stay: 11 days  Attending Physician: Tanner Coello MD  Primary Care Provider: [facility]    Isolation Status: Special Contact  Assessment/Plan:      Pulmonary  Acute hypoxemic respiratory failure  Patient appears to have started requiring oxygen around when she received blood transfusions on march 2 and march 4.  Differential includes transfusion associated lung injury vs pulmonary edema from volume overload state.  Pulmonary embolism and HAP are possible but seem less likely as patient has been on anticoagulation and antibiotics since admission.      Plan    1. Supportive care.  Oxygen as needed.    2. Gentle diuresis.    3. Collect respiratory cultures from her. If she is able to cough.    4. Agree with CT chest.    ID  Sepsis  82 year old with fever and slightly elevated wbc count.  Patient also has some new onset hypoxia.  She had recent blood transfusions on march 2 and march 4. Appears she started requiring oxygen around that time.  Differential includes drug fever vs surgical wound infection of the foot vs transfusion related lung injury.  C diff is also possible as patient is reporting occasional loose stools.      Recommendations    1. Follow up on blood cultures.    2. Send stool for C diff.    3. Discontinue zosyn IV.          Anticipated Disposition: TBD    Thank you for your consult. I will follow-up with patient. Please contact us if you have any additional questions.    Nain Hopper MD  Infectious Disease  [facility]    Subjective:     Principal Problem:Critical limb ischemia with history of revascularization of same extremity    HPI: 82 year old female with a history of HTN, DMII, DVT, CHF, PAD, DVT and a history of prior fem-fem 20 years prior that occluded s/p R iliofemoral bypass.  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  She is admitted for worsening right foot pain, swelling and discoloration following removal of an ingrown toe nail.  Patient was diagnosed with dry gangrene of the foot.  She underwent Left external iliac to right SFA bypass with 8mm PTFE on March 2.  She then underwent transmetatarsal amputation of the right foot on March 4.  Cultures from proximal bone margins were negative but the pathology is pending.  ID is consulted to assist with her care.         Interval History:     Patient has been having fevers, slowly increasing wbc count and hypoxia.    Review of Systems   All other systems reviewed and are negative.  Objective:     Vital Signs (Most Recent):  Temp: 98.1 °F (36.7 °C) (03/10/23 1937)  Pulse: 78 (03/10/23 2051)  Resp: 18 (03/10/23 2051)  BP: 135/66 (03/10/23 1937)  SpO2: 95 % (03/10/23 2051) Vital Signs (24h Range):  Temp:  [98.1 °F (36.7 °C)-100.5 °F (38.1 °C)] 98.1 °F (36.7 °C)  Pulse:  [74-92] 78  Resp:  [18-20] 18  SpO2:  [95 %-100 %] 95 %  BP: (113-135)/(54-66) 135/66     Weight: 73.5 kg (162 lb 0.6 oz)  Body mass index is 27.81 kg/m².    Estimated Creatinine Clearance: 30.4 mL/min (based on SCr of 1.4 mg/dL).    Physical Exam  Vitals and nursing note reviewed.   Constitutional:       General: She is not in acute distress.     Appearance: She is well-developed. She is not diaphoretic.   HENT:      Head: Normocephalic and atraumatic.      Right Ear: External ear normal.      Left Ear: External ear normal.      Nose: Nose normal.      Mouth/Throat:      Pharynx: No oropharyngeal exudate.   Eyes:      General: No scleral icterus.        Right eye: No discharge.         Left eye: No discharge.      Conjunctiva/sclera: Conjunctivae normal.      Pupils: Pupils are equal, round, and reactive to light.   Neck:      Thyroid: No thyromegaly.      Vascular: No JVD.      Trachea: No tracheal  deviation.   Cardiovascular:      Rate and Rhythm: Normal rate and regular rhythm.      Heart sounds: No murmur heard.    No friction rub. No gallop.   Pulmonary:      Effort: Pulmonary effort is normal. No respiratory distress.      Breath sounds: Normal breath sounds. No stridor. No wheezing or rales.   Chest:      Chest wall: No tenderness.   Abdominal:      General: Bowel sounds are normal. There is no distension.      Palpations: Abdomen is soft. There is no mass.      Tenderness: There is no abdominal tenderness. There is no guarding or rebound.   Musculoskeletal:         General: Normal range of motion.      Cervical back: Normal range of motion and neck supple.      Comments: Right foot covering with surgical dressings. Pictures reviewed.   Lymphadenopathy:      Cervical: No cervical adenopathy.   Skin:     General: Skin is warm.      Coloration: Skin is not pale.      Findings: No erythema or rash.   Neurological:      Mental Status: She is alert and oriented to person, place, and time.      Motor: No abnormal muscle tone.       Significant Labs:   Microbiology Results (last 7 days)       Procedure Component Value Units Date/Time    Gram stain [[id number]] Collected: 03/10/23 1340    Order Status: Completed Specimen: Wound from Foot, Right Updated: 03/10/23 1924     Gram Stain Result Rare WBC's      No organisms seen    Narrative:      Right foot tma site wound    Clostridium difficile EIA [[id number]]     Order Status: No result Specimen: Stool     Blood culture [[id number]] Collected: 03/10/23 0942    Order Status: Completed Specimen: Blood from Antecubital, Right Arm Updated: 03/10/23 1715     Blood Culture, Routine No Growth to date    Blood culture [[id number]] Collected: 03/10/23 0943    Order Status: Completed Specimen: Blood from Peripheral, Right Hand Updated: 03/10/23 1715     Blood Culture, Routine No Growth to date    Culture, Anaerobe [[id number]] Collected: 03/10/23 1340    Order Status: Sent  Specimen: Wound from Foot, Right Updated: 03/10/23 1345    Aerobic culture [[id number]] Collected: 03/10/23 1340    Order Status: Sent Specimen: Wound from Foot, Right Updated: 03/10/23 1344    Blood culture [[id number]] Collected: 03/08/23 2325    Order Status: Completed Specimen: Blood from Peripheral, Lower Arm, Left Updated: 03/10/23 0613     Blood Culture, Routine No Growth to date      No Growth to date    Narrative:      Left Peripheral    Blood culture [[id number]] Collected: 03/08/23 2325    Order Status: Completed Specimen: Blood from Peripheral, Lower Arm, Right Updated: 03/10/23 0613     Blood Culture, Routine No Growth to date      No Growth to date    Narrative:      Right Peripheral    Culture, Anaerobe [[id number]] Collected: 03/04/23 1141    Order Status: Completed Specimen: Incision site from Foot, Right Updated: 03/09/23 1435     Anaerobic Culture Culture in progress    Narrative:      3rd Metatarsal    Fungus culture [[id number]] Collected: 03/04/23 1141    Order Status: Completed Specimen: Incision site from Foot, Right Updated: 03/09/23 1018     Fungus (Mycology) Culture Culture in progress    Narrative:      3rd Metatarsal    Aerobic culture [[id number]] Collected: 03/04/23 1141    Order Status: Completed Specimen: Incision site from Foot, Right Updated: 03/07/23 0844     Aerobic Bacterial Culture No growth    Narrative:      3rd Metatarsal    Gram stain [[id number]] Collected: 03/04/23 1141    Order Status: Completed Specimen: Incision site from Foot, Right Updated: 03/04/23 1418     Gram Stain Result No WBC's      No organisms seen    Narrative:      3rd Metatarsal    AFB Culture & Smear [[id number]] Collected: 03/04/23 1150    Order Status: Sent Specimen: Incision site from Foot, Right Updated: 03/04/23 1150            Significant Imaging: I have reviewed all pertinent imaging results/findings within the past 24 hours.

## 2023-03-11 NOTE — SUBJECTIVE & OBJECTIVE
Interval History:     Patient has been having fevers, slowly increasing wbc count and hypoxia.    Review of Systems   All other systems reviewed and are negative.  Objective:     Vital Signs (Most Recent):  Temp: 98.1 °F (36.7 °C) (03/10/23 1937)  Pulse: 78 (03/10/23 2051)  Resp: 18 (03/10/23 2051)  BP: 135/66 (03/10/23 1937)  SpO2: 95 % (03/10/23 2051) Vital Signs (24h Range):  Temp:  [98.1 °F (36.7 °C)-100.5 °F (38.1 °C)] 98.1 °F (36.7 °C)  Pulse:  [74-92] 78  Resp:  [18-20] 18  SpO2:  [95 %-100 %] 95 %  BP: (113-135)/(54-66) 135/66     Weight: 73.5 kg (162 lb 0.6 oz)  Body mass index is 27.81 kg/m².    Estimated Creatinine Clearance: 30.4 mL/min (based on SCr of 1.4 mg/dL).    Physical Exam  Vitals and nursing note reviewed.   Constitutional:       General: She is not in acute distress.     Appearance: She is well-developed. She is not diaphoretic.   HENT:      Head: Normocephalic and atraumatic.      Right Ear: External ear normal.      Left Ear: External ear normal.      Nose: Nose normal.      Mouth/Throat:      Pharynx: No oropharyngeal exudate.   Eyes:      General: No scleral icterus.        Right eye: No discharge.         Left eye: No discharge.      Conjunctiva/sclera: Conjunctivae normal.      Pupils: Pupils are equal, round, and reactive to light.   Neck:      Thyroid: No thyromegaly.      Vascular: No JVD.      Trachea: No tracheal deviation.   Cardiovascular:      Rate and Rhythm: Normal rate and regular rhythm.      Heart sounds: No murmur heard.    No friction rub. No gallop.   Pulmonary:      Effort: Pulmonary effort is normal. No respiratory distress.      Breath sounds: Normal breath sounds. No stridor. No wheezing or rales.   Chest:      Chest wall: No tenderness.   Abdominal:      General: Bowel sounds are normal. There is no distension.      Palpations: Abdomen is soft. There is no mass.      Tenderness: There is no abdominal tenderness. There is no guarding or rebound.   Musculoskeletal:          General: Normal range of motion.      Cervical back: Normal range of motion and neck supple.      Comments: Right foot covering with surgical dressings. Pictures reviewed.   Lymphadenopathy:      Cervical: No cervical adenopathy.   Skin:     General: Skin is warm.      Coloration: Skin is not pale.      Findings: No erythema or rash.   Neurological:      Mental Status: She is alert and oriented to person, place, and time.      Motor: No abnormal muscle tone.       Significant Labs:   Microbiology Results (last 7 days)       Procedure Component Value Units Date/Time    Gram stain [[id number]] Collected: 03/10/23 1340    Order Status: Completed Specimen: Wound from Foot, Right Updated: 03/10/23 1924     Gram Stain Result Rare WBC's      No organisms seen    Narrative:      Right foot tma site wound    Clostridium difficile EIA [[id number]]     Order Status: No result Specimen: Stool     Blood culture [[id number]] Collected: 03/10/23 0942    Order Status: Completed Specimen: Blood from Antecubital, Right Arm Updated: 03/10/23 1715     Blood Culture, Routine No Growth to date    Blood culture [[id number]] Collected: 03/10/23 0943    Order Status: Completed Specimen: Blood from Peripheral, Right Hand Updated: 03/10/23 1715     Blood Culture, Routine No Growth to date    Culture, Anaerobe [[id number]] Collected: 03/10/23 1340    Order Status: Sent Specimen: Wound from Foot, Right Updated: 03/10/23 1345    Aerobic culture [[id number]] Collected: 03/10/23 1340    Order Status: Sent Specimen: Wound from Foot, Right Updated: 03/10/23 1344    Blood culture [[id number]] Collected: 03/08/23 2325    Order Status: Completed Specimen: Blood from Peripheral, Lower Arm, Left Updated: 03/10/23 0613     Blood Culture, Routine No Growth to date      No Growth to date    Narrative:      Left Peripheral    Blood culture [[id number]] Collected: 03/08/23 2325    Order Status: Completed Specimen: Blood from Peripheral, Lower Arm, Right Updated:  03/10/23 0613     Blood Culture, Routine No Growth to date      No Growth to date    Narrative:      Right Peripheral    Culture, Anaerobe [[id number]] Collected: 03/04/23 1141    Order Status: Completed Specimen: Incision site from Foot, Right Updated: 03/09/23 1435     Anaerobic Culture Culture in progress    Narrative:      3rd Metatarsal    Fungus culture [[id number]] Collected: 03/04/23 1141    Order Status: Completed Specimen: Incision site from Foot, Right Updated: 03/09/23 1018     Fungus (Mycology) Culture Culture in progress    Narrative:      3rd Metatarsal    Aerobic culture [[id number]] Collected: 03/04/23 1141    Order Status: Completed Specimen: Incision site from Foot, Right Updated: 03/07/23 0844     Aerobic Bacterial Culture No growth    Narrative:      3rd Metatarsal    Gram stain [[id number]] Collected: 03/04/23 1141    Order Status: Completed Specimen: Incision site from Foot, Right Updated: 03/04/23 1418     Gram Stain Result No WBC's      No organisms seen    Narrative:      3rd Metatarsal    AFB Culture & Smear [[id number]] Collected: 03/04/23 1150    Order Status: Sent Specimen: Incision site from Foot, Right Updated: 03/04/23 1150            Significant Imaging: I have reviewed all pertinent imaging results/findings within the past 24 hours.

## 2023-03-11 NOTE — NURSING
Tele order discontinued. Tele box removed and returned to US Madie. Telemetry called and updated on d/c order.

## 2023-03-11 NOTE — ASSESSMENT & PLAN NOTE
Plan:  -Continuing to monitor right TMA site. If foot worsens or no other source is found then may consider opening of incision and exploration of surgical wound.   -Antibiotic plan per ID.  -Appreciate vascular surgery recs.  -Dressing changes by nursing.  -RLE WB to heel in surgical shoe for transfers or short distances.   -Elevate RLE at all times while in bed or chair.  -Podiatry will follow.

## 2023-03-11 NOTE — PROGRESS NOTES
[facility]  Endocrinology  Progress Note    Admit Date: 2/27/2023     Reason for Consult: Management of T2DM, Hyperglycemia     Surgical Procedure and Date: s/p  Left external iliac to right SFA bypass with 8mm PTFE; Left common and external iliac artery angioplasty and stenting, 8mm Lifestent; Aortogram, pelvic angiogram; Right lower extremity angiogram; and Right SFA angioplasty with plain and drug coated 4mm balloon on 3/8/2023    Diabetes diagnosis year: approx 26 years ago    Home Diabetes Medications:  Glipizide 10 mg daily, Novolog-NPH 70/30 50 units BID (prior Metformin but no longer) - uncertain why both sulfonylurea and insulin    How often checking glucose at home? 1-3 x day   BG readings on regimen: 100-200  Hypoglycemia on the regimen?  Yes at times  Missed doses on regimen?  No    Diabetes Complications include:     Hyperglycemia and Diabetic dermatitis    Complicating diabetes co morbidities:   HTN; DVT; CHF    HPI: This is an 82-year-old female with a past medical history of severe PAD, hypertension, and insulin dependant type 2 diabetes, history of DVT who presents with right foot pain. Patient reports worsening right foot pain, swelling and discoloration that started after she had an ingrown toenail removed by her podiatrist.  She reports that her discoloration started since 2/7. Of note, the patient underwent an angiogram on 02/23 by vascular surgery. In the ED, she was tachycardic (112), and hypertensive.  Labs showed microcytic anemia (10.2), elevated sed rate (112), elevated CRP (56.5), elevated lactic acid (2.5).  Foot x-ray showed DJD and spurs on the calcaneus with no acute process. Vascular surgery recommended  transfer to [facility] for hybrid OR surgery 3/2/23. Now s/p  Left external iliac to right SFA bypass with 8mm PTFE; Left common and external iliac artery angioplasty and stenting, 8mm Lifestent; Aortogram, pelvic angiogram; Right lower extremity angiogram; and Right SFA angioplasty  with plain and drug coated 4mm balloon on 3/8/2023. Endocrine consulted to manage hyperglycemia and type 2 diabetes. Primary requesting BG not exceed 180 mg/dL.      Lab Results   Component Value Date    HGBA1C 8.2 (H) 2023     On evaluation the patient confirms that she has been a diabetic for 26 years.  She is on both Glipizide and 70/30 insulin.  Managed by her PCP.  She denies any issues with hypoglycemia or symptoms concerning for this.  Family history of diabetes includes multiple sisters and brothers.        Interval HPI:   Overnight events: No acute events overnight. Patient in room 1062/1062 A. Blood glucose improving. BG at and above goal on current insulin regimen (SSI, prandial, and basal insulin ). Steroid use- None. 6 Days Post-Op  Renal function- Normal   Vasopressors-  None       Endocrine will continue to follow and manage insulin orders inpatient.       Diet diabetic [facility]; 2000 Calorie; Fluid - 1000mL     Eatin%  Nausea: No  Hypoglycemia and intervention: No  Fever: No  TPN and/or TF: No      ROS:  As above    Current Medications and/or Treatments Impacting Glycemic Control  Immunotherapy:    Immunosuppressants       None          Steroids:   Hormones (From admission, onward)      Start     Stop Route Frequency Ordered    23 2100  melatonin tablet 6 mg         -- Oral Nightly 23 1546          Pressors:    Autonomic Drugs (From admission, onward)      None          Hyperglycemia/Diabetes Medications:   Antihyperglycemics (From admission, onward)      Start     Stop Route Frequency Ordered    23 2100  insulin detemir U-100 pen 15 Units         -- SubQ 2 times daily 23 1549    23 1925  insulin aspart U-100 pen 0-15 Units         -- SubQ Before meals & nightly PRN 23 1826             PHYSICAL EXAMINATION:  Vitals:    03/10/23 0714   BP: 114/65   Pulse: 89   Resp: 18   Temp: 98.6 °F (37 °C)     Body mass index is 29.35  kg/m².            ASSESSMENT and PLAN    Cardiac/Vascular  * Critical limb ischemia with history of revascularization of same extremity  Optimize BG control to improve wound healing        Essential hypertension  Uncontrolled HTN can worsen insulin resistance.   Patient on an ARB per ADA guidelines.         Endocrine  Type 2 diabetes mellitus  Endocrinology consulted for BG management.   BG goal 140-180, currently in the 170s this morning    WBD at 0.5 units/kg/day  - Continue Levemir (Insulin Detemir) 20 units daily  - Continue Novolog (Insulin Aspart) 7 units TIDWM and prn for BG excursions [facility] SSI (150/25)  - BG checks AC/HS  - Hypoglycemia protocol in place  - If blood glucose greater than 300, please ask patient not to eat food or drink anything other than water until correctional insulin has brought it back below 250    ** Please notify Endocrine for any change and/or advance in diet**  ** Please call Endocrine for any BG related issues **    Discharge Planning:   TBD. Please notify endocrinology prior to discharge.            Kashmir Madrigal,   Endocrinology

## 2023-03-11 NOTE — NURSING
Called blood bank to confirm was ready yet; due to PM nurse being unable to transfuse as pt has antibodies so transfusion order was passed on to oncoming AM shift nurse. Blood bank not yet done as she had to search her entire inventory; may have something within the next 10-15mins. Callback number given to blood bank; awaiting call back. Will continue to monitor patient.

## 2023-03-11 NOTE — ASSESSMENT & PLAN NOTE
Nelsy Marino is a 82 y.o. lady with severe PAD    -s/p TMA 3/4  -Recommend 2 units PRBC transfusion  -Recommend Abx   -Consider GI eval or imaging   -UA cultures negative, blood cultures no growth to date, wound cultures NGTD  -please have a low threshold to escalate antibiotics. If her graft becomes infected and requires antibiotics, she has no other options left for revascularizations  -Q4H neurovasc checks  -Continue diet  -ASA/plavix/Statin/Eliquis   -PT/OT  -Podiatry following  -remainder of care per primary    Dispo: SNF  Follow up in two weeks with Bettina, ARCHIE/TBI, US Graft

## 2023-03-11 NOTE — ASSESSMENT & PLAN NOTE
Patient appears to have started requiring oxygen around when she received blood transfusions on march 2 and march 4.  Differential includes transfusion associated lung injury vs pulmonary edema from volume overload state.  Pulmonary embolism and HAP are possible but seem less likely as patient has been on anticoagulation and antibiotics since admission.      Plan    1. Supportive care.  Oxygen as needed.    2. Gentle diuresis.    3. Collect respiratory cultures from her. If she is able to cough.    4. Agree with CT chest.

## 2023-03-11 NOTE — SUBJECTIVE & OBJECTIVE
Interval History: Patient sitting in chair, no acute distress. Family at bedside. O2 requirement steadily improving while on diuresis.  Patient notes significant posterior ankle pain. Increased tramadol and ordered robaxin. Good urine output. Patient continues to note sleep disturbances mainly due to BLE pain. Ordering transfusion for Hb 6.4. WBC continues to rise from 13 to 15. Per vascular surgery, continuing wound care for right TMA and no evidence of active infection at the surgical site. Per ID, continuing supportive care and diuresis and discontinuing IV zosyn. Also will obtain respiratory cultures, order C diff panel and followup CT chest.            Review of Systems   Constitutional:  Positive for activity change and fatigue. Negative for chills and fever.   HENT:  Negative for congestion.    Eyes:  Negative for visual disturbance.   Respiratory:  Positive for cough and shortness of breath.    Cardiovascular:  Positive for leg swelling. Negative for chest pain.   Gastrointestinal:  Negative for abdominal distention, abdominal pain, nausea and vomiting.   Genitourinary:  Negative for dysuria.   Musculoskeletal:  Positive for arthralgias (right foot) and myalgias (right foot). Negative for back pain.   Skin:  Negative for rash and wound.   Neurological:  Positive for weakness. Negative for dizziness.   Psychiatric/Behavioral:  Negative for confusion.    Objective:     Vital Signs (Most Recent):  Temp: 98 °F (36.7 °C) (03/11/23 1054)  Pulse: 99 (03/11/23 1054)  Resp: 18 (03/11/23 1054)  BP: 119/86 (03/11/23 1054)  SpO2: 98 % (03/11/23 1054)   Vital Signs (24h Range):  Temp:  [98 °F (36.7 °C)-98.9 °F (37.2 °C)] 98 °F (36.7 °C)  Pulse:  [74-99] 99  Resp:  [17-18] 18  SpO2:  [95 %-100 %] 98 %  BP: (113-135)/(51-86) 119/86     Weight: 73.5 kg (162 lb 0.6 oz)  Body mass index is 27.81 kg/m².  No intake or output data in the 24 hours ending 03/11/23 1110     Physical Exam  Constitutional:       Appearance: Normal  appearance.   HENT:      Head: Normocephalic.      Mouth/Throat:      Mouth: Mucous membranes are moist.   Eyes:      Extraocular Movements: Extraocular movements intact.      Pupils: Pupils are equal, round, and reactive to light.   Neck:      Comments: JVD absent  Cardiovascular:      Rate and Rhythm: Normal rate and regular rhythm.      Pulses: Normal pulses.      Heart sounds: Normal heart sounds.   Pulmonary:      Effort: Respiratory distress present.      Breath sounds: Normal breath sounds.   Abdominal:      General: Bowel sounds are normal. There is no distension.      Palpations: Abdomen is soft.      Tenderness: There is no abdominal tenderness.   Musculoskeletal:         General: Normal range of motion.      Cervical back: Neck supple.      Right lower leg: Edema present.      Left lower leg: Edema present.      Comments: Right TMA with dressing c/d/I and surgical shoe in place   Neurological:      General: No focal deficit present.      Mental Status: She is alert.   Psychiatric:         Mood and Affect: Mood normal.         Cognition and Memory: Cognition is impaired. Memory is impaired.       Significant Labs: A1C:   Recent Labs   Lab 02/28/23  0549   HGBA1C 8.2*       Blood Culture:   Recent Labs   Lab 03/10/23  0942 03/10/23  0943   LABBLOO No Growth to date No Growth to date       CBC:   Recent Labs   Lab 03/10/23  0413 03/11/23  0425   WBC 15.46* 18.51*   HGB 6.4* 5.9*   HCT 20.6* 19.7*    443       CMP:   Recent Labs   Lab 03/10/23  0413 03/11/23  0425    138   K 3.9 4.3   CL 99 98   CO2 29 29   * 175*   BUN 46* 54*   CREATININE 1.4 1.5*   CALCIUM 8.8 9.2   PROT 6.7 6.9   ALBUMIN 2.0* 2.0*   BILITOT 0.6 0.6   ALKPHOS 64 51*   AST 17 24   ALT 11 17   ANIONGAP 8 11       Coagulation:   Recent Labs   Lab 03/11/23 0425   APTT 35.6*       Lactic Acid:   No results for input(s): LACTATE in the last 48 hours.    Magnesium:   Recent Labs   Lab 03/10/23  0413 03/11/23  0425   MG 2.2  2.3       POCT Glucose:   Recent Labs   Lab 03/10/23  2220 03/11/23  0818 03/11/23  1053   POCTGLUCOSE 194* 174* 129*       Troponin:   No results for input(s): TROPONINI, TROPONINIHS in the last 48 hours.    TSH: No results for input(s): TSH in the last 4320 hours.  Urine Studies:   Recent Labs   Lab 03/09/23  1427   COLORU Yellow   APPEARANCEUA Clear   PHUR 5.0   SPECGRAV 1.015   PROTEINUA Negative   GLUCUA Negative   KETONESU Negative   BILIRUBINUA Negative   OCCULTUA Negative   NITRITE Negative   LEUKOCYTESUR Negative         Significant Imaging: I have reviewed all pertinent imaging results/findings within the past 24 hours.

## 2023-03-11 NOTE — NURSING
1225- blood bank called to confirm that blood is ready for transfusion      1322- patient being prepared for blood admin. Pre-transfusion vitals being taken.

## 2023-03-11 NOTE — PROGRESS NOTES
[facility]  Podiatry  Progress Note    Patient Name: Nelsy Marino  MRN: [id number]  Admission Date: 2/27/2023  Hospital Length of Stay: 12 days  Attending Physician: Tanner Coello MD  Primary Care Provider: [facility]     Subjective:     Interval History: Vitals stable. No new pedal complaints, ongoing foot pain. Dressings clean dry, intact. WBC continue to rise, now 18.51.       Follow-up For: Procedure(s) (LRB):  AMPUTATION, FOOT, TRANSMETATARSAL (Right)    Post-Operative Day: 7 Days Post-Op    Scheduled Meds:   acetaminophen  1,000 mg Oral Q8H    amLODIPine  10 mg Oral Daily    apixaban  5 mg Oral BID    aspirin  81 mg Oral Daily    atorvastatin  40 mg Oral QHS    clopidogreL  75 mg Oral Daily    dextromethorphan-guaiFENesin  mg  1 tablet Oral BID    furosemide (LASIX) injection  60 mg Intravenous BID loop    insulin aspart U-100  7 Units Subcutaneous TIDWM    insulin detemir U-100  20 Units Subcutaneous QHS    levalbuterol  1.25 mg Nebulization Q8H WA    melatonin  6 mg Oral Nightly    methocarbamoL  500 mg Oral QID    mupirocin   Nasal BID     Continuous Infusions:  PRN Meds:sodium chloride, dextrose 10%, dextrose 10%, dextrose, dextrose, glucagon (human recombinant), glucagon (human recombinant), insulin aspart U-100, levalbuterol, naloxone, ondansetron, prochlorperazine, simethicone, sodium chloride 0.9%, traMADoL    Review of Systems   Constitutional:  Negative for chills and fever.   HENT:  Negative for hearing loss and trouble swallowing.    Eyes:  Negative for discharge and visual disturbance.   Respiratory:  Negative for chest tightness and shortness of breath.    Cardiovascular:  Negative for chest pain and palpitations.   Gastrointestinal:  Negative for abdominal distention, abdominal pain, nausea and vomiting.   Genitourinary:  Negative for difficulty urinating and hematuria.   Musculoskeletal:  Negative for arthralgias and back pain.   Skin:  Positive for color  change and wound. Negative for rash.   Neurological:  Negative for dizziness and headaches.   Objective:     Vital Signs (Most Recent):  Temp: 98.5 °F (36.9 °C) (03/11/23 0752)  Pulse: 81 (03/11/23 0833)  Resp: 18 (03/11/23 0833)  BP: (!) 122/51 (03/11/23 0752)  SpO2: 96 % (03/11/23 0833)   Vital Signs (24h Range):  Temp:  [98.1 °F (36.7 °C)-98.9 °F (37.2 °C)] 98.5 °F (36.9 °C)  Pulse:  [74-92] 81  Resp:  [17-18] 18  SpO2:  [95 %-100 %] 96 %  BP: (113-135)/(51-66) 122/51     Weight: 73.5 kg (162 lb 0.6 oz)  Body mass index is 27.81 kg/m².    Foot Exam    General  Orientation: alert and oriented to person, place, and time   Affect: appropriate       Right Foot/Ankle     Inspection and Palpation  Tenderness: (TMA site, mild)  Swelling: (TMA site, mild)  Skin Exam: drainage, maceration and ulcer (incision);     Neurovascular  Dorsalis pedis: doppler  Posterior tibial: doppler  Saphenous nerve sensation: diminished  Tibial nerve sensation: diminished  Superficial peroneal nerve sensation: diminished  Deep peroneal nerve sensation: diminished  Sural nerve sensation: diminished    Comments  S/p right tma. Sutures are intact, 2 sutures previously removed at site of drainage. Mild serosanguinous drainage from central area of incision, with periwound maceration. Moderate tenderness throughout surgical site.     Left Foot/Ankle      Inspection and Palpation  Tenderness: none   Swelling: none   Skin Exam: skin intact;     Neurovascular  Dorsalis pedis: absent  Posterior tibial: doppler  Saphenous nerve sensation: diminished  Tibial nerve sensation: diminished  Superficial peroneal nerve sensation: diminished  Deep peroneal nerve sensation: diminished  Sural nerve sensation: diminished    Comments  No open wounds noted            Laboratory:  Blood Cultures:   Recent Labs   Lab 03/10/23  0942 03/10/23  0943   LABBLOO No Growth to date No Growth to date     CBC:   Recent Labs   Lab 03/11/23  0425   WBC 18.51*   RBC 2.31*   HGB  5.9*   HCT 19.7*      MCV 85   MCH 25.5*   MCHC 29.9*     CMP:   Recent Labs   Lab 03/11/23  0425   *   CALCIUM 9.2   ALBUMIN 2.0*   PROT 6.9      K 4.3   CO2 29   CL 98   BUN 54*   CREATININE 1.5*   ALKPHOS 51*   ALT 17   AST 24   BILITOT 0.6     CRP:   Recent Labs   Lab 03/10/23  0942   .0*     ESR:   Recent Labs   Lab 03/10/23  0942   SEDRATE 64*     Microbiology Results (last 7 days)       Procedure Component Value Units Date/Time    Culture, Respiratory with Gram Stain [[id number]]     Order Status: No result Specimen: Respiratory     Aerobic culture [[id number]] Collected: 03/10/23 1340    Order Status: Completed Specimen: Wound from Foot, Right Updated: 03/11/23 0758     Aerobic Bacterial Culture No growth    Narrative:      Right foot tma site wound    Blood culture [[id number]] Collected: 03/08/23 2325    Order Status: Completed Specimen: Blood from Peripheral, Lower Arm, Right Updated: 03/11/23 0613     Blood Culture, Routine No Growth to date      No Growth to date      No Growth to date    Narrative:      Right Peripheral    Blood culture [[id number]] Collected: 03/08/23 2325    Order Status: Completed Specimen: Blood from Peripheral, Lower Arm, Left Updated: 03/11/23 0613     Blood Culture, Routine No Growth to date      No Growth to date      No Growth to date    Narrative:      Left Peripheral    Gram stain [[id number]] Collected: 03/10/23 1340    Order Status: Completed Specimen: Wound from Foot, Right Updated: 03/10/23 1924     Gram Stain Result Rare WBC's      No organisms seen    Narrative:      Right foot tma site wound    Clostridium difficile EIA [[id number]]     Order Status: No result Specimen: Stool     Blood culture [[id number]] Collected: 03/10/23 0942    Order Status: Completed Specimen: Blood from Antecubital, Right Arm Updated: 03/10/23 1715     Blood Culture, Routine No Growth to date    Blood culture [[id number]] Collected: 03/10/23 0943    Order Status: Completed  Specimen: Blood from Peripheral, Right Hand Updated: 03/10/23 1715     Blood Culture, Routine No Growth to date    Culture, Anaerobe [[id number]] Collected: 03/10/23 1340    Order Status: Sent Specimen: Wound from Foot, Right Updated: 03/10/23 1345    Culture, Anaerobe [[id number]] Collected: 03/04/23 1141    Order Status: Completed Specimen: Incision site from Foot, Right Updated: 03/09/23 1435     Anaerobic Culture Culture in progress    Narrative:      3rd Metatarsal    Fungus culture [[id number]] Collected: 03/04/23 1141    Order Status: Completed Specimen: Incision site from Foot, Right Updated: 03/09/23 1018     Fungus (Mycology) Culture Culture in progress    Narrative:      3rd Metatarsal    Aerobic culture [[id number]] Collected: 03/04/23 1141    Order Status: Completed Specimen: Incision site from Foot, Right Updated: 03/07/23 0844     Aerobic Bacterial Culture No growth    Narrative:      3rd Metatarsal    Gram stain [[id number]] Collected: 03/04/23 1141    Order Status: Completed Specimen: Incision site from Foot, Right Updated: 03/04/23 1418     Gram Stain Result No WBC's      No organisms seen    Narrative:      3rd Metatarsal    AFB Culture & Smear [[id number]] Collected: 03/04/23 1150    Order Status: Sent Specimen: Incision site from Foot, Right Updated: 03/04/23 1150          Specimen (24h ago, onward)      None                Assessment/Plan:     Cardiac/Vascular  * Critical limb ischemia with history of revascularization of same extremity  Per vascular surgery    ID  Sepsis  Per hospital medicine and ID    Endocrine  Type 2 diabetes mellitus  Per hospital medicine      Orthopedic  S/P transmetatarsal amputation of foot, right  Plan:  -Continuing to monitor right TMA site. If foot worsens or no other source is found then may consider opening of incision and exploration of surgical wound.   -Antibiotic plan per ID.  -Appreciate vascular surgery recs.  -Dressing changes by nursing.  -RLE WB to heel in  surgical shoe for transfers or short distances.   -Elevate RLE at all times while in bed or chair.  -Podiatry will follow.         Lalo Pollock DPM PGY-3  Podiatric Medicine & Surgery  [facility]  Secure Chat Preferred  Mobile: [phone]  Pager: [phone]

## 2023-03-11 NOTE — ASSESSMENT & PLAN NOTE
Endocrinology consulted for BG management.   BG goal 140-180, currently in the 170s this morning    WBD at 0.5 units/kg/day  - Continue Levemir (Insulin Detemir) 20 units daily  - Continue Novolog (Insulin Aspart) 7 units TIDWM and prn for BG excursions [facility] (150/25)  - BG checks AC/HS  - Hypoglycemia protocol in place  - If blood glucose greater than 300, please ask patient not to eat food or drink anything other than water until correctional insulin has brought it back below 250    ** Please notify Endocrine for any change and/or advance in diet**  ** Please call Endocrine for any BG related issues **    Discharge Planning:   TBD. Please notify endocrinology prior to discharge.

## 2023-03-12 LAB
ALBUMIN SERPL BCP-MCNC: 2.1 G/DL (ref 3.5–5.2)
ALLENS TEST: ABNORMAL
ALP SERPL-CCNC: 68 U/L (ref 55–135)
ALT SERPL W/O P-5'-P-CCNC: 17 U/L (ref 10–44)
ANION GAP SERPL CALC-SCNC: 11 MMOL/L (ref 8–16)
APTT BLDCRRT: 35.7 SEC (ref 21–32)
AST SERPL-CCNC: 31 U/L (ref 10–40)
BASOPHILS # BLD AUTO: 0.04 K/UL (ref 0–0.2)
BASOPHILS NFR BLD: 0.2 % (ref 0–1.9)
BILIRUB SERPL-MCNC: 0.7 MG/DL (ref 0.1–1)
BUN SERPL-MCNC: 57 MG/DL (ref 8–23)
CALCIUM SERPL-MCNC: 9.1 MG/DL (ref 8.7–10.5)
CHLORIDE SERPL-SCNC: 98 MMOL/L (ref 95–110)
CO2 SERPL-SCNC: 31 MMOL/L (ref 23–29)
CREAT SERPL-MCNC: 1.2 MG/DL (ref 0.5–1.4)
DELSYS: ABNORMAL
DIFFERENTIAL METHOD: ABNORMAL
EOSINOPHIL # BLD AUTO: 0.2 K/UL (ref 0–0.5)
EOSINOPHIL NFR BLD: 0.7 % (ref 0–8)
ERYTHROCYTE [DISTWIDTH] IN BLOOD BY AUTOMATED COUNT: 15 % (ref 11.5–14.5)
EST. GFR  (NO RACE VARIABLE): 45.2 ML/MIN/1.73 M^2
GLUCOSE SERPL-MCNC: 93 MG/DL (ref 70–110)
HCO3 UR-SCNC: 31.2 MMOL/L (ref 24–28)
HCT VFR BLD AUTO: 26.1 % (ref 37–48.5)
HCT VFR BLD CALC: 25 %PCV (ref 36–54)
HGB BLD-MCNC: 8.3 G/DL (ref 12–16)
IMM GRANULOCYTES # BLD AUTO: 0.14 K/UL (ref 0–0.04)
IMM GRANULOCYTES NFR BLD AUTO: 0.7 % (ref 0–0.5)
LYMPHOCYTES # BLD AUTO: 1 K/UL (ref 1–4.8)
LYMPHOCYTES NFR BLD: 5.1 % (ref 18–48)
MCH RBC QN AUTO: 26.8 PG (ref 27–31)
MCHC RBC AUTO-ENTMCNC: 31.8 G/DL (ref 32–36)
MCV RBC AUTO: 84 FL (ref 82–98)
MONOCYTES # BLD AUTO: 1.1 K/UL (ref 0.3–1)
MONOCYTES NFR BLD: 5.2 % (ref 4–15)
NEUTROPHILS # BLD AUTO: 17.6 K/UL (ref 1.8–7.7)
NEUTROPHILS NFR BLD: 88.1 % (ref 38–73)
NRBC BLD-RTO: 0 /100 WBC
OB PNL STL: POSITIVE
PCO2 BLDA: 46.1 MMHG (ref 35–45)
PH SMN: 7.44 [PH] (ref 7.35–7.45)
PLATELET # BLD AUTO: 473 K/UL (ref 150–450)
PMV BLD AUTO: 10.4 FL (ref 9.2–12.9)
PO2 BLDA: 45 MMHG (ref 40–60)
POC BE: 7 MMOL/L
POC IONIZED CALCIUM: 1.11 MMOL/L (ref 1.06–1.42)
POC SATURATED O2: 82 % (ref 95–100)
POC TCO2: 33 MMOL/L (ref 24–29)
POCT GLUCOSE: 100 MG/DL (ref 70–110)
POCT GLUCOSE: 124 MG/DL (ref 70–110)
POCT GLUCOSE: 139 MG/DL (ref 70–110)
POCT GLUCOSE: 141 MG/DL (ref 70–110)
POCT GLUCOSE: 175 MG/DL (ref 70–110)
POCT GLUCOSE: 179 MG/DL (ref 70–110)
POCT GLUCOSE: 216 MG/DL (ref 70–110)
POTASSIUM BLD-SCNC: 4 MMOL/L (ref 3.5–5.1)
POTASSIUM SERPL-SCNC: 3.8 MMOL/L (ref 3.5–5.1)
PROT SERPL-MCNC: 7.2 G/DL (ref 6–8.4)
RBC # BLD AUTO: 3.1 M/UL (ref 4–5.4)
SAMPLE: ABNORMAL
SITE: ABNORMAL
SODIUM BLD-SCNC: 139 MMOL/L (ref 136–145)
SODIUM SERPL-SCNC: 140 MMOL/L (ref 136–145)
WBC # BLD AUTO: 20.05 K/UL (ref 3.9–12.7)

## 2023-03-12 PROCEDURE — 99233 PR SUBSEQUENT HOSPITAL CARE,LEVL III: ICD-10-PCS | Mod: GC,,, | Performed by: INTERNAL MEDICINE

## 2023-03-12 PROCEDURE — 27000221 HC OXYGEN, UP TO 24 HOURS

## 2023-03-12 PROCEDURE — 27000646 HC AEROBIKA DEVICE

## 2023-03-12 PROCEDURE — 94640 AIRWAY INHALATION TREATMENT: CPT

## 2023-03-12 PROCEDURE — 99233 PR SUBSEQUENT HOSPITAL CARE,LEVL III: ICD-10-PCS | Mod: ,,, | Performed by: INTERNAL MEDICINE

## 2023-03-12 PROCEDURE — 82800 BLOOD PH: CPT

## 2023-03-12 PROCEDURE — 94664 DEMO&/EVAL PT USE INHALER: CPT

## 2023-03-12 PROCEDURE — 85014 HEMATOCRIT: CPT

## 2023-03-12 PROCEDURE — 85025 COMPLETE CBC W/AUTO DIFF WBC: CPT | Performed by: STUDENT IN AN ORGANIZED HEALTH CARE EDUCATION/TRAINING PROGRAM

## 2023-03-12 PROCEDURE — 25000003 PHARM REV CODE 250: Performed by: INTERNAL MEDICINE

## 2023-03-12 PROCEDURE — 20600001 HC STEP DOWN PRIVATE ROOM

## 2023-03-12 PROCEDURE — 99233 SBSQ HOSP IP/OBS HIGH 50: CPT | Mod: GC,,, | Performed by: INTERNAL MEDICINE

## 2023-03-12 PROCEDURE — 80053 COMPREHEN METABOLIC PANEL: CPT | Performed by: STUDENT IN AN ORGANIZED HEALTH CARE EDUCATION/TRAINING PROGRAM

## 2023-03-12 PROCEDURE — 84295 ASSAY OF SERUM SODIUM: CPT

## 2023-03-12 PROCEDURE — 94668 MNPJ CHEST WALL SBSQ: CPT

## 2023-03-12 PROCEDURE — 25000003 PHARM REV CODE 250: Performed by: STUDENT IN AN ORGANIZED HEALTH CARE EDUCATION/TRAINING PROGRAM

## 2023-03-12 PROCEDURE — 99232 PR SUBSEQUENT HOSPITAL CARE,LEVL II: ICD-10-PCS | Mod: GC,,, | Performed by: GENERAL ACUTE CARE HOSPITAL

## 2023-03-12 PROCEDURE — 99233 SBSQ HOSP IP/OBS HIGH 50: CPT | Mod: ,,, | Performed by: INTERNAL MEDICINE

## 2023-03-12 PROCEDURE — 82803 BLOOD GASES ANY COMBINATION: CPT

## 2023-03-12 PROCEDURE — 25000242 PHARM REV CODE 250 ALT 637 W/ HCPCS: Performed by: INTERNAL MEDICINE

## 2023-03-12 PROCEDURE — 82272 OCCULT BLD FECES 1-3 TESTS: CPT | Performed by: INTERNAL MEDICINE

## 2023-03-12 PROCEDURE — 36415 COLL VENOUS BLD VENIPUNCTURE: CPT | Performed by: STUDENT IN AN ORGANIZED HEALTH CARE EDUCATION/TRAINING PROGRAM

## 2023-03-12 PROCEDURE — 85730 THROMBOPLASTIN TIME PARTIAL: CPT | Performed by: STUDENT IN AN ORGANIZED HEALTH CARE EDUCATION/TRAINING PROGRAM

## 2023-03-12 PROCEDURE — 99232 SBSQ HOSP IP/OBS MODERATE 35: CPT | Mod: GC,,, | Performed by: GENERAL ACUTE CARE HOSPITAL

## 2023-03-12 PROCEDURE — 94761 N-INVAS EAR/PLS OXIMETRY MLT: CPT

## 2023-03-12 PROCEDURE — 27000207 HC ISOLATION

## 2023-03-12 PROCEDURE — 82330 ASSAY OF CALCIUM: CPT

## 2023-03-12 PROCEDURE — 84132 ASSAY OF SERUM POTASSIUM: CPT

## 2023-03-12 PROCEDURE — 99900035 HC TECH TIME PER 15 MIN (STAT)

## 2023-03-12 RX ORDER — FUROSEMIDE 40 MG/1
40 TABLET ORAL 2 TIMES DAILY
Status: DISCONTINUED | OUTPATIENT
Start: 2023-03-12 | End: 2023-03-20 | Stop reason: HOSPADM

## 2023-03-12 RX ORDER — ACETAMINOPHEN 325 MG/1
650 TABLET ORAL EVERY 6 HOURS PRN
Status: DISCONTINUED | OUTPATIENT
Start: 2023-03-12 | End: 2023-03-20 | Stop reason: HOSPADM

## 2023-03-12 RX ADMIN — METHOCARBAMOL 500 MG: 500 TABLET ORAL at 12:03

## 2023-03-12 RX ADMIN — FUROSEMIDE 40 MG: 40 TABLET ORAL at 06:03

## 2023-03-12 RX ADMIN — ACETAMINOPHEN 1000 MG: 500 TABLET ORAL at 02:03

## 2023-03-12 RX ADMIN — ASPIRIN 81 MG: 81 TABLET, COATED ORAL at 10:03

## 2023-03-12 RX ADMIN — GABAPENTIN 100 MG: 100 CAPSULE ORAL at 10:03

## 2023-03-12 RX ADMIN — INSULIN ASPART 7 UNITS: 100 INJECTION, SOLUTION INTRAVENOUS; SUBCUTANEOUS at 05:03

## 2023-03-12 RX ADMIN — TRAMADOL HYDROCHLORIDE 50 MG: 50 TABLET, COATED ORAL at 12:03

## 2023-03-12 RX ADMIN — GABAPENTIN 100 MG: 100 CAPSULE ORAL at 02:03

## 2023-03-12 RX ADMIN — CLOPIDOGREL BISULFATE 75 MG: 75 TABLET ORAL at 10:03

## 2023-03-12 RX ADMIN — MUPIROCIN: 20 OINTMENT TOPICAL at 10:03

## 2023-03-12 RX ADMIN — LEVALBUTEROL 1.25 MG: 1.25 SOLUTION, CONCENTRATE RESPIRATORY (INHALATION) at 07:03

## 2023-03-12 RX ADMIN — INSULIN ASPART 7 UNITS: 100 INJECTION, SOLUTION INTRAVENOUS; SUBCUTANEOUS at 08:03

## 2023-03-12 RX ADMIN — MUPIROCIN: 20 OINTMENT TOPICAL at 09:03

## 2023-03-12 RX ADMIN — AMLODIPINE BESYLATE 10 MG: 10 TABLET ORAL at 10:03

## 2023-03-12 RX ADMIN — METHOCARBAMOL 500 MG: 500 TABLET ORAL at 05:03

## 2023-03-12 RX ADMIN — ACETAMINOPHEN 1000 MG: 500 TABLET ORAL at 05:03

## 2023-03-12 RX ADMIN — METHOCARBAMOL 500 MG: 500 TABLET ORAL at 10:03

## 2023-03-12 RX ADMIN — INSULIN ASPART 7 UNITS: 100 INJECTION, SOLUTION INTRAVENOUS; SUBCUTANEOUS at 12:03

## 2023-03-12 RX ADMIN — GUAIFENESIN AND DEXTROMETHORPHAN HYDROBROMIDE 1 TABLET: 600; 30 TABLET, EXTENDED RELEASE ORAL at 10:03

## 2023-03-12 RX ADMIN — LINEZOLID 600 MG: 600 TABLET, FILM COATED ORAL at 10:03

## 2023-03-12 NOTE — SUBJECTIVE & OBJECTIVE
Interval History: no new fevers noted.       Review of Systems   Constitutional:  Positive for chills and fatigue. Negative for activity change, appetite change and fever.   Respiratory:  Negative for cough and shortness of breath.    Gastrointestinal:  Negative for abdominal distention, blood in stool and vomiting.   Genitourinary:  Negative for dysuria, hematuria and urgency.   Skin:  Positive for rash and wound.     Objective:     Vital Signs (Most Recent):  Temp: 98.7 °F (37.1 °C) (03/12/23 0824)  Pulse: 79 (03/12/23 0824)  Resp: 17 (03/12/23 0824)  BP: (!) 127/58 (03/12/23 0824)  SpO2: 96 % (03/12/23 0824)   Vital Signs (24h Range):  Temp:  [97.9 °F (36.6 °C)-99.3 °F (37.4 °C)] 98.7 °F (37.1 °C)  Pulse:  [76-99] 79  Resp:  [16-18] 17  SpO2:  [92 %-100 %] 96 %  BP: (119-159)/(56-88) 127/58     Weight: 73.5 kg (162 lb 0.6 oz)  Body mass index is 27.81 kg/m².    Estimated Creatinine Clearance: 35.5 mL/min (based on SCr of 1.2 mg/dL).    Physical Exam  Constitutional:       Appearance: Normal appearance. She is ill-appearing. She is not toxic-appearing.   HENT:      Head: Normocephalic and atraumatic.      Nose: Nose normal.      Mouth/Throat:      Mouth: Mucous membranes are moist.      Pharynx: Oropharynx is clear.   Eyes:      Extraocular Movements: Extraocular movements intact.      Conjunctiva/sclera: Conjunctivae normal.      Pupils: Pupils are equal, round, and reactive to light.   Cardiovascular:      Rate and Rhythm: Normal rate and regular rhythm.      Pulses: Normal pulses.      Heart sounds: Normal heart sounds.   Pulmonary:      Effort: Pulmonary effort is normal.      Breath sounds: Normal breath sounds.   Abdominal:      General: Abdomen is flat. Bowel sounds are normal.      Palpations: Abdomen is soft.   Musculoskeletal:         General: Tenderness and deformity present. No swelling.      Cervical back: Normal range of motion and neck supple.   Skin:     General: Skin is warm and dry.       Findings: Bruising and lesion present.      Comments: Right TMA with dressing c/d/I and surgical dressing in place, photos reviewed.     Mild erythema at incision site on L groin, with scant purulence     Neurological:      Mental Status: She is alert. Mental status is at baseline.   Psychiatric:         Behavior: Behavior normal.         Thought Content: Thought content normal.       Significant Labs: CBC:   Recent Labs   Lab 03/11/23 0425 03/12/23 0420   WBC 18.51* 20.05*   HGB 5.9* 8.3*   HCT 19.7* 26.1*    473*     CMP:   Recent Labs   Lab 03/11/23 0425 03/12/23 0420    140   K 4.3 3.8   CL 98 98   CO2 29 31*   * 93   BUN 54* 57*   CREATININE 1.5* 1.2   CALCIUM 9.2 9.1   PROT 6.9 7.2   ALBUMIN 2.0* 2.1*   BILITOT 0.6 0.7   ALKPHOS 51* 68   AST 24 31   ALT 17 17   ANIONGAP 11 11     Microbiology Results (last 7 days)       Procedure Component Value Units Date/Time    Blood culture [[id number]] Collected: 03/08/23 2325    Order Status: Completed Specimen: Blood from Peripheral, Lower Arm, Left Updated: 03/12/23 0612     Blood Culture, Routine No Growth to date      No Growth to date      No Growth to date      No Growth to date    Narrative:      Left Peripheral    Blood culture [[id number]] Collected: 03/08/23 2325    Order Status: Completed Specimen: Blood from Peripheral, Lower Arm, Right Updated: 03/12/23 0612     Blood Culture, Routine No Growth to date      No Growth to date      No Growth to date      No Growth to date    Narrative:      Right Peripheral    Blood culture [[id number]] Collected: 03/10/23 0942    Order Status: Completed Specimen: Blood from Antecubital, Right Arm Updated: 03/11/23 1212     Blood Culture, Routine No Growth to date      No Growth to date    Blood culture [[id number]] Collected: 03/10/23 0943    Order Status: Completed Specimen: Blood from Peripheral, Right Hand Updated: 03/11/23 1212     Blood Culture, Routine No Growth to date      No Growth to date     Respiratory Infection Panel (PCR), Nasopharyngeal [[id number]]     Order Status: No result Specimen: Nasopharyngeal Swab     Culture, Respiratory with Gram Stain [[id number]]     Order Status: No result Specimen: Respiratory     Aerobic culture [[id number]] Collected: 03/10/23 1340    Order Status: Completed Specimen: Wound from Foot, Right Updated: 03/11/23 0758     Aerobic Bacterial Culture No growth    Narrative:      Right foot tma site wound    Gram stain [[id number]] Collected: 03/10/23 1340    Order Status: Completed Specimen: Wound from Foot, Right Updated: 03/10/23 1924     Gram Stain Result Rare WBC's      No organisms seen    Narrative:      Right foot tma site wound    Clostridium difficile EIA [[id number]]     Order Status: Canceled Specimen: Stool     Culture, Anaerobe [[id number]] Collected: 03/10/23 1340    Order Status: Sent Specimen: Wound from Foot, Right Updated: 03/10/23 1345    Culture, Anaerobe [[id number]] Collected: 03/04/23 1141    Order Status: Completed Specimen: Incision site from Foot, Right Updated: 03/09/23 1435     Anaerobic Culture Culture in progress    Narrative:      3rd Metatarsal    Fungus culture [[id number]] Collected: 03/04/23 1141    Order Status: Completed Specimen: Incision site from Foot, Right Updated: 03/09/23 1018     Fungus (Mycology) Culture Culture in progress    Narrative:      3rd Metatarsal    Aerobic culture [[id number]] Collected: 03/04/23 1141    Order Status: Completed Specimen: Incision site from Foot, Right Updated: 03/07/23 0844     Aerobic Bacterial Culture No growth    Narrative:      3rd Metatarsal          Pathology Results  (Last 10 years)      None          All pertinent labs within the past 24 hours have been reviewed.  Recent Lab Results  (Last 5 results in the past 24 hours)        03/12/23  0800   03/12/23  0420   03/11/23  2119   03/11/23  1708   03/11/23  1609        Albumin   2.1             Alkaline Phosphatase   68             ALT   17              Anion Gap   11             aPTT   35.7  Comment: aPTT therapeutic range = 39-69 seconds             AST   31             Baso #   0.04             Basophil %   0.2             BILIRUBIN TOTAL   0.7  Comment: For infants and newborns, interpretation of results should be based  on gestational age, weight and in agreement with clinical  observations.    Premature Infant recommended reference ranges:  Up to 24 hours.............<8.0 mg/dL  Up to 48 hours............<12.0 mg/dL  3-5 days..................<15.0 mg/dL  6-29 days.................<15.0 mg/dL               BUN   57             Calcium   9.1             Chloride   98             CO2   31             Creatinine   1.2             Differential Method   Automated             eGFR   45.2             Eos #   0.2             Eosinophil %   0.7             Glucose   93             Gran # (ANC)   17.6             Gran %   88.1             Hematocrit   26.1             Hemoglobin   8.3             Immature Grans (Abs)   0.14  Comment: Mild elevation in immature granulocytes is non specific and   can be seen in a variety of conditions including stress response,   acute inflammation, trauma and pregnancy. Correlation with other   laboratory and clinical findings is essential.               Immature Granulocytes   0.7             Lymph #   1.0             Lymph %   5.1             MCH   26.8             MCHC   31.8             MCV   84             Mono #   1.1             Mono %   5.2             MPV   10.4             nRBC   0             Platelets   473             POCT Glucose 139     135   156   136       Potassium   3.8             PROTEIN TOTAL   7.2             RBC   3.10             RDW   15.0             Sodium   140             WBC   20.05                                    Significant Imaging: I have reviewed all pertinent imaging results/findings within the past 24 hours.

## 2023-03-12 NOTE — ASSESSMENT & PLAN NOTE
-Right foot appears to have wet gangrene on exam. S/p right TMA on 3/4  - fever 100.5 on 3/9  - WBC risin --> 15 --> 18  - procal slightly elevated at 0.65  - ESR 64,   - per vascular surgery, right TMA does not appear infected  - per podiatry, if no other infectious source identified, will consider opening of incision and exploration of surgical wound.   - ID consulted. apprec recs  -- discontinued IV zosyn. Started linezolid to cover for SSTI  -- continue supportive care  -- blood culture NGTD  - stool sample for c diff panel could not be obtained   -- followup sputum culture, RIP  -- CT C/A/P negative for infection

## 2023-03-12 NOTE — ASSESSMENT & PLAN NOTE
- on chronic suppression with keflex  - escalated to zosyn due to increased oxygen requirement to cover to possible PNA  - discontinued zosyn per ID. Started linezolid

## 2023-03-12 NOTE — SUBJECTIVE & OBJECTIVE
Interval History: Patient lying in bed, no acute distress. Patient notes improvement in BLE pain with addition of tramadol and gabapentin. H/H stable after pRBC transfusion on 3/11. Patient had bowel movement and c diff and stool occult ordered. Later in the afternoon, nurse reported that patient was more somnolent and had tremors. Gabapentin and depakote discontinued and will continue to monitor mental status. Continuing linezolid per ID recs for fever and rising WBC. If patient worsens, podiatry will consider re-exploration of right TMA. Resuming oral lasix since LOLY resolved.     Intake/Output Summary (Last 24 hours) at 3/12/2023 1020  Last data filed at 3/11/2023 1844  Gross per 24 hour   Intake 272 ml   Output 400 ml   Net -128 ml           Review of Systems   Constitutional:  Positive for activity change and fatigue. Negative for chills and fever.   HENT:  Negative for congestion.    Eyes:  Negative for visual disturbance.   Respiratory:  Positive for cough and shortness of breath.    Cardiovascular:  Positive for leg swelling. Negative for chest pain.   Gastrointestinal:  Negative for abdominal distention, abdominal pain, nausea and vomiting.   Genitourinary:  Negative for dysuria.   Musculoskeletal:  Positive for arthralgias (right foot) and myalgias (right foot). Negative for back pain.   Skin:  Negative for rash and wound.   Neurological:  Positive for weakness. Negative for dizziness.   Psychiatric/Behavioral:  Negative for confusion.      Objective:     Vital Signs (Most Recent):  Temp: 98.7 °F (37.1 °C) (03/12/23 0824)  Pulse: 79 (03/12/23 0824)  Resp: 17 (03/12/23 0824)  BP: (!) 127/58 (03/12/23 0824)  SpO2: 96 % (03/12/23 0824)   Vital Signs (24h Range):  Temp:  [97.9 °F (36.6 °C)-99.3 °F (37.4 °C)] 98.7 °F (37.1 °C)  Pulse:  [76-99] 79  Resp:  [16-18] 17  SpO2:  [92 %-100 %] 96 %  BP: (119-159)/(56-88) 127/58     Weight: 73.5 kg (162 lb 0.6 oz)  Body mass index is 27.81 kg/m².    Intake/Output  Summary (Last 24 hours) at 3/12/2023 1020  Last data filed at 3/11/2023 1844  Gross per 24 hour   Intake 272 ml   Output 400 ml   Net -128 ml        Physical Exam  Constitutional:       Appearance: Normal appearance.   HENT:      Head: Normocephalic.      Mouth/Throat:      Mouth: Mucous membranes are moist.   Eyes:      Extraocular Movements: Extraocular movements intact.      Pupils: Pupils are equal, round, and reactive to light.   Neck:      Comments: JVD absent  Cardiovascular:      Rate and Rhythm: Normal rate and regular rhythm.      Pulses: Normal pulses.      Heart sounds: Normal heart sounds.   Pulmonary:      Effort: Respiratory distress present.      Breath sounds: Normal breath sounds.   Abdominal:      General: Bowel sounds are normal. There is no distension.      Palpations: Abdomen is soft.      Tenderness: There is no abdominal tenderness.   Musculoskeletal:         General: Normal range of motion.      Cervical back: Neck supple.      Right lower leg: Edema present.      Left lower leg: Edema present.      Comments: Right TMA with dressing c/d/I and surgical shoe in place   Neurological:      General: No focal deficit present.      Mental Status: She is alert.   Psychiatric:         Mood and Affect: Mood normal.         Cognition and Memory: Cognition is impaired. Memory is impaired.       Significant Labs: A1C:   Recent Labs   Lab 02/28/23  0549   HGBA1C 8.2*       Blood Culture:   Recent Labs   Lab 03/10/23  0942 03/10/23  0943   LABBLOO No Growth to date  No Growth to date No Growth to date  No Growth to date       CBC:   Recent Labs   Lab 03/11/23  0425 03/12/23  0420   WBC 18.51* 20.05*   HGB 5.9* 8.3*   HCT 19.7* 26.1*    473*       CMP:   Recent Labs   Lab 03/11/23  0425 03/12/23  0420    140   K 4.3 3.8   CL 98 98   CO2 29 31*   * 93   BUN 54* 57*   CREATININE 1.5* 1.2   CALCIUM 9.2 9.1   PROT 6.9 7.2   ALBUMIN 2.0* 2.1*   BILITOT 0.6 0.7   ALKPHOS 51* 68   AST 24 31    ALT 17 17   ANIONGAP 11 11       Coagulation:   Recent Labs   Lab 03/12/23  0420   APTT 35.7*       Lactic Acid:   No results for input(s): LACTATE in the last 48 hours.    Magnesium:   Recent Labs   Lab 03/11/23  0425   MG 2.3       POCT Glucose:   Recent Labs   Lab 03/11/23  2119 03/12/23  0800 03/12/23  0842   POCTGLUCOSE 135* 139* 179*           Significant Imaging: I have reviewed all pertinent imaging results/findings within the past 24 hours.

## 2023-03-12 NOTE — PROGRESS NOTES
[facility] Medicine  Progress Note    Patient Name: Nelsy Marino  MRN: [id number]  Patient Class: IP- Inpatient   Admission Date: 2/27/2023  Length of Stay: 12 days  Attending Physician: Tanner Coello MD  Primary Care Provider: Juan F Garay        Subjective:     Principal Problem:Critical limb ischemia with history of revascularization of same extremity        HPI:  This is an 82-year-old female with a past medical history of severe PAD, hypertension, type 2 diabetes, history of DVT who presents with right foot pain.    Patient reports worsening right foot pain, swelling and discoloration that started after she had an ingrown toenail removed by her podiatrist.  She reports that her discoloration started since 2/7. Of note, the patient underwent an angiogram on 02/23 by vascular surgery. In the ED, she was tachycardic (112), and hypertensive.  Labs showed microcytic anemia (10.2), elevated sed rate (112), elevated CRP (56.5), elevated lactic acid (2.5).  Foot x-ray showed DJD and spurs on the calcaneus with no acute process.  Vascular surgery was consulted and recommended continuing antibiotics and evaluating in the morning.  She was given morphine 4 mg IV, vancomycin, Zosyn and was admitted for further management.      Overview/Hospital Course:   82-year-old female with a past medical history of severe PAD, hypertension, type 2 diabetes, history of DVT admitted on 02/27/2023 for gangrene of right toes. Of note, patient recent underwent LE angiogram by Dr. Tran on 02/23/23. Per OP report, was able to get successful revascularization / resolution of stenosis. XR right foot with no fracture, dislocation, bone destruction, or foreign body seen.  There is DJD and spurs on the calcaneus.  Was started on IV Zosyn and vancomycin on admit. Blood  cx with NGTD. On 02/28, patient with hypoglycemia but was asymptomatic. Improved with glucogen. Likely due to NPO status and was still receiving  insulin.  Cardiology consulted for preoperative clearance.  Recommended stress test.  Normal myocardial perfusion scan.  Cardiology deem patient at acceptable cardiac risk for vasc surgery/anesthesia.  Vascular surgery consulted and patient underwent fem fem bypass on 3/2/2023. Podiatry also consulted and patient underwent right TMA on 3/4/23.     Stepped down to Hospital Medicine service. O2 requirement steadily improving while on diuresis.  Patient notes significant posterior ankle pain. Increased tramadol and ordered robaxin. Good urine output. Patient continues to note sleep disturbances mainly due to BLE pain. Ordering transfusion for Hb 6.4. WBC continues to rise from 13 to 15. Per vascular surgery, continuing wound care for right TMA and no evidence of active infection at the surgical site. Per ID, continuing supportive care and diuresis and discontinuing IV zosyn. Also will obtain respiratory cultures, order C diff panel and followup CT chest.            Interval History: Patient lying in bed, no acute distress. Family at bedside. Patient notes significant b/l lower leg pain poorly controlled with tramadol. BLE pain likely due to PAD, neuropathy and deconditioning. Added gabapentin. Ordered to additional unit of pRBC due to anemia. Holding home Eliquis for now and will resume AC if H/H remains stable over the weekend.     WBC continues to uptrend now to 18. Per vascular surgery, continuing wound care for right TMA and no evidence of active infection at the surgical site. Per ID, started linezolid to cover possible SSTI. CT C/A/P negative for infectious source and patient treated with zosyn previously this hospital stay. Also will try to obtain respiratory cultures and RIP. Per podiatry, if no clear infectious source identified and WBC continues to rise, will consider taking patient to OR to re-assess the TMA. Holding IV lasix for now due to rising Cr and bicarb. Weaned to 4 L NC.       Intake/Output Summary  (Last 24 hours) at 3/11/2023 1903  Last data filed at 3/11/2023 1844  Gross per 24 hour   Intake 272 ml   Output 400 ml   Net -128 ml              Review of Systems   Constitutional:  Positive for activity change and fatigue. Negative for chills and fever.   HENT:  Negative for congestion.    Eyes:  Negative for visual disturbance.   Respiratory:  Positive for cough and shortness of breath.    Cardiovascular:  Positive for leg swelling. Negative for chest pain.   Gastrointestinal:  Negative for abdominal distention, abdominal pain, nausea and vomiting.   Genitourinary:  Negative for dysuria.   Musculoskeletal:  Positive for arthralgias (right foot) and myalgias (right foot). Negative for back pain.   Skin:  Negative for rash and wound.   Neurological:  Positive for weakness. Negative for dizziness.   Psychiatric/Behavioral:  Negative for confusion.      Objective:     Vital Signs (Most Recent):  Temp: 98.4 °F (36.9 °C) (03/11/23 1841)  Pulse: 77 (03/11/23 1841)  Resp: 18 (03/11/23 1841)  BP: 134/63 (03/11/23 1841)  SpO2: 97 % (03/11/23 1841)   Vital Signs (24h Range):  Temp:  [97.9 °F (36.6 °C)-99.1 °F (37.3 °C)] 98.4 °F (36.9 °C)  Pulse:  [76-99] 77  Resp:  [16-18] 18  SpO2:  [95 %-100 %] 97 %  BP: (119-144)/(51-88) 134/63     Weight: 73.5 kg (162 lb 0.6 oz)  Body mass index is 27.81 kg/m².    Intake/Output Summary (Last 24 hours) at 3/11/2023 1858  Last data filed at 3/11/2023 1844  Gross per 24 hour   Intake 272 ml   Output 400 ml   Net -128 ml        Physical Exam  Constitutional:       Appearance: Normal appearance.   HENT:      Head: Normocephalic.      Mouth/Throat:      Mouth: Mucous membranes are moist.   Eyes:      Extraocular Movements: Extraocular movements intact.      Pupils: Pupils are equal, round, and reactive to light.   Neck:      Comments: JVD absent  Cardiovascular:      Rate and Rhythm: Normal rate and regular rhythm.      Pulses: Normal pulses.      Heart sounds: Normal heart sounds.    Pulmonary:      Effort: Respiratory distress present.      Breath sounds: Normal breath sounds.   Abdominal:      General: Bowel sounds are normal. There is no distension.      Palpations: Abdomen is soft.      Tenderness: There is no abdominal tenderness.   Musculoskeletal:         General: Normal range of motion.      Cervical back: Neck supple.      Right lower leg: Edema present.      Left lower leg: Edema present.      Comments: Right TMA with dressing c/d/I and surgical shoe in place   Neurological:      General: No focal deficit present.      Mental Status: She is alert.   Psychiatric:         Mood and Affect: Mood normal.         Cognition and Memory: Cognition is impaired. Memory is impaired.       Significant Labs: A1C:   Recent Labs   Lab 02/28/23  0549   HGBA1C 8.2*       Blood Culture:   Recent Labs   Lab 03/10/23  0942 03/10/23  0943   LABBLOO No Growth to date  No Growth to date No Growth to date  No Growth to date       CBC:   Recent Labs   Lab 03/10/23  0413 03/11/23  0425   WBC 15.46* 18.51*   HGB 6.4* 5.9*   HCT 20.6* 19.7*    443       CMP:   Recent Labs   Lab 03/10/23  0413 03/11/23  0425    138   K 3.9 4.3   CL 99 98   CO2 29 29   * 175*   BUN 46* 54*   CREATININE 1.4 1.5*   CALCIUM 8.8 9.2   PROT 6.7 6.9   ALBUMIN 2.0* 2.0*   BILITOT 0.6 0.6   ALKPHOS 64 51*   AST 17 24   ALT 11 17   ANIONGAP 8 11       Coagulation:   Recent Labs   Lab 03/11/23  0425   APTT 35.6*       Lactic Acid:   No results for input(s): LACTATE in the last 48 hours.    Magnesium:   Recent Labs   Lab 03/10/23  0413 03/11/23  0425   MG 2.2 2.3       POCT Glucose:   Recent Labs   Lab 03/11/23  1208 03/11/23  1609 03/11/23  1708   POCTGLUCOSE 135* 136* 156*           Significant Imaging: I have reviewed all pertinent imaging results/findings within the past 24 hours.      Assessment/Plan:      * Critical limb ischemia with history of revascularization of same extremity  -Vascular surgery consult.  "apprec recs  -- s/p fem fem bypass on 3/2/2023  -- s/p right TMA on 3/4/2023.   --No concern for surgical incision infection at this time.  -- low threshold to escalate antibiotics. If her graft becomes infected and requires antibiotics, she has no other options left for revascularizations. ID following  -- Q4H neurovasc checks  -- ASA/plavix/Statin/Eliquis. Hepatin gtt in replace of eliquis for now due to worsening anemia  -- PT/OT  --Recommend PRBC transfusion  --Consider GI eval or imaging   --UA cultures negative, blood cultures no growth to date, wound cultures NGTD, cdiff pending.  -Podiatry consulted. apprec recs  --Dressing changes by nursing.  --RLE WB to heel in surgical shoe for transfers or short distances.   --Elevate RLE at all times while in bed or chair.  -- Continuing to monitor right TMA site. If foot worsens or no other source is found then may consider opening of incision and exploration of surgical wound   - outpatient podiatry wound care instructions when patient medically ready for discharge: Please order ambulatory referral to podiatry for outpatient followup. RLE WB to heel in surgical shoe for transfers or short distances. HH/facility dressing changes R foot 3x/week: cleanse incision with saline, apply betadine soaked adaptic, apply gauze, wrap with cast padding and coban.       Leukocytosis  - see "sepsis"      S/P transmetatarsal amputation of foot, right  - stable  - appreciated vascular surgery and podiatry recs  -- s/p TMA 3/4  -- low threshold to escalate antibiotics. If her graft becomes infected and requires antibiotics, she has no other options left for revascularizations  -- Q4H neurovasc checks  -- ASA/plavix/Statin/Eliquis   -- PT/OT  --Dressing changes by nursing.  --RLE WB to heel in surgical shoe for transfers or short distances.   --Elevate RLE at all times while in bed or chair.  -- DC Instructions: Please order ambulatory referral to podiatry for outpatient followup. RLE " WB to heel in surgical shoe for transfers or short distances. HH/facility dressing changes R foot 3x/week: cleanse incision with saline, apply betadine soaked adaptic, apply gauze, wrap with cast padding and coban.   - scheduled tylenol and prn tramadol      Vascular graft infection  - on chronic suppression with keflex  - escalated to zosyn due to increased oxygen requirement to cover to possible PNA  - discontinued zosyn per ID. Started linezolid      Hypoglycemia  Better now  Continue levemir and LDSS    Sepsis  -Right foot appears to have wet gangrene on exam. S/p right TMA on 3/4  - fever 100.5 on 3/9  - WBC risin --> 15 --> 18  - procal slightly elevated at 0.65  - ESR 64,   - per vascular surgery, right TMA does not appear infected  - per podiatry, if no other infectious source identified, will consider opening of incision and exploration of surgical wound.   - ID consulted. apprec recs  -- discontinued IV zosyn. Started linezolid to cover for SSTI  -- continue supportive care  -- blood culture NGTD  - stool sample for c diff panel could not be obtained   -- followup sputum culture, RIP  -- CT C/A/P negative for infection        History of DVT (deep vein thrombosis)  Not on AC outpatient.    - heparin gtt in replace of eliquis for now until Hb stable and no additional surgical intervention indicated  - was switched to eliquis but now holding AC on 3/11 due to drop in Hb. No evidence of active bleeding so will reassess H/H tomrrow and if stable will resume eliquis    Acute hypoxemic respiratory failure  Patient with Hypercapnic and Hypoxic Respiratory failure which is Acute on chronic.  she is not on home oxygen. Supplemental oxygen was provided and noted-  .   Signs/symptoms of respiratory failure include- tachypnea, increased work of breathing and lethargy. Contributing diagnoses includes - CHF, Obesity Hypoventilation and Pneumonia Labs and images were reviewed. Patient Has recent ABG, which has  been reviewed. Will treat underlying causes and adjust management of respiratory failure.    PLAN:  - increased from 2 L NC to 12 L NC on 3/78  - now weaned to 4 L NC  - CXR showed pulmonary edema with no consolidation  - switched to xoponex due to tremors on duonebs  - incentive spirometry   - mucinex and prn tessalon perles  - chest PT and acapella   - OOB to chair  - IMPROVING        Type 2 diabetes mellitus  Stable from hypoglycemia standpoint; continue levemir and LDSSI      Essential hypertension  -Continue home medications:  Amlodipine 10 mg daily, hydrochlorothiazide 25 mg daily, losartan 100 mg daily        VTE Risk Mitigation (From admission, onward)         Ordered     Reason for No Pharmacological VTE Prophylaxis  Once        Question:  Reasons:  Answer:  IV Heparin w/in 24 hrs. Pre or Post-Op    03/02/23 1918     IP VTE HIGH RISK PATIENT  Once         03/02/23 1918     Place sequential compression device  Until discontinued         02/27/23 2200                Discharge Planning   JUAN ALBERTO: 3/15/2023     Code Status: Full Code   Is the patient medically ready for discharge?: No    Reason for patient still in hospital (select all that apply): Patient unstable, Patient trending condition, Laboratory test, Treatment, Consult recommendations, PT / OT recommendations and Pending disposition  Discharge Plan A: Long-term acute care facility (LTAC)   Discharge Delays: None known at this time        Time spent in care of patient: > 35 minutes         Tanner Coello MD  Department of Hospital Medicine   [facility]

## 2023-03-12 NOTE — PLAN OF CARE
Problem: Adult Inpatient Plan of Care  Goal: Plan of Care Review  Outcome: Ongoing, Progressing  Goal: Patient-Specific Goal (Individualized)  Outcome: Ongoing, Progressing  Goal: Absence of Hospital-Acquired Illness or Injury  Outcome: Ongoing, Progressing  Goal: Optimal Comfort and Wellbeing  Outcome: Ongoing, Progressing     Problem: Diabetes Comorbidity  Goal: Blood Glucose Level Within Targeted Range  Outcome: Ongoing, Progressing     Problem: Adjustment to Illness (Sepsis/Septic Shock)  Goal: Optimal Coping  Outcome: Ongoing, Progressing     Problem: Bleeding (Sepsis/Septic Shock)  Goal: Absence of Bleeding  Outcome: Ongoing, Progressing     Problem: Glycemic Control Impaired (Sepsis/Septic Shock)  Goal: Blood Glucose Level Within Desired Range  Outcome: Ongoing, Progressing     Problem: Infection Progression (Sepsis/Septic Shock)  Goal: Absence of Infection Signs and Symptoms  Outcome: Ongoing, Progressing     Problem: Nutrition Impaired (Sepsis/Septic Shock)  Goal: Optimal Nutrition Intake  Outcome: Ongoing, Progressing     Problem: Fall Injury Risk  Goal: Absence of Fall and Fall-Related Injury  Outcome: Ongoing, Progressing     Problem: Skin Injury Risk Increased  Goal: Skin Health and Integrity  Outcome: Ongoing, Progressing     Problem: Infection  Goal: Absence of Infection Signs and Symptoms  Outcome: Ongoing, Progressing

## 2023-03-12 NOTE — SUBJECTIVE & OBJECTIVE
Interval History:   Afebrile since 3/8, but having uptrending leukocytosis. Vasc concerned graft may become infected if bacteremic. Has had 1 bm today, so cdiff not sent.     Review of Systems   Gastrointestinal:  Negative for abdominal pain and diarrhea.   All other systems reviewed and are negative.  Objective:     Vital Signs (Most Recent):  Temp: 98.7 °F (37.1 °C) (03/11/23 2011)  Pulse: 84 (03/11/23 2011)  Resp: 18 (03/11/23 2011)  BP: 139/78 (03/11/23 2011)  SpO2: (!) 94 % (03/11/23 2011) Vital Signs (24h Range):  Temp:  [97.9 °F (36.6 °C)-99.1 °F (37.3 °C)] 98.7 °F (37.1 °C)  Pulse:  [76-99] 84  Resp:  [16-18] 18  SpO2:  [94 %-100 %] 94 %  BP: (119-144)/(51-88) 139/78     Weight: 73.5 kg (162 lb 0.6 oz)  Body mass index is 27.81 kg/m².    Estimated Creatinine Clearance: 28.4 mL/min (A) (based on SCr of 1.5 mg/dL (H)).    Physical Exam  Vitals and nursing note reviewed.   Constitutional:       General: She is not in acute distress.     Appearance: She is well-developed. She is not diaphoretic.   HENT:      Head: Normocephalic and atraumatic.      Right Ear: External ear normal.      Left Ear: External ear normal.      Nose: Nose normal.      Mouth/Throat:      Pharynx: No oropharyngeal exudate.   Eyes:      General: No scleral icterus.        Right eye: No discharge.         Left eye: No discharge.      Conjunctiva/sclera: Conjunctivae normal.      Pupils: Pupils are equal, round, and reactive to light.   Neck:      Thyroid: No thyromegaly.      Vascular: No JVD.      Trachea: No tracheal deviation.   Cardiovascular:      Rate and Rhythm: Normal rate and regular rhythm.      Heart sounds: No murmur heard.    No friction rub. No gallop.   Pulmonary:      Effort: Pulmonary effort is normal. No respiratory distress.      Breath sounds: Normal breath sounds. No stridor. No wheezing or rales.   Chest:      Chest wall: No tenderness.   Abdominal:      General: Bowel sounds are normal. There is no distension.       Palpations: Abdomen is soft. There is no mass.      Tenderness: There is no abdominal tenderness. There is no guarding or rebound.   Musculoskeletal:         General: Normal range of motion.      Cervical back: Normal range of motion and neck supple.      Comments: Right foot covering with surgical dressings. Pictures reviewed.    Mild erythema at clips on rt groin. Lt groin incision is mildly erythematous and becoming macerated distally. Now purulence.    Lymphadenopathy:      Cervical: No cervical adenopathy.   Skin:     General: Skin is warm.      Coloration: Skin is not pale.      Findings: No erythema or rash.   Neurological:      Mental Status: She is alert and oriented to person, place, and time.      Motor: No abnormal muscle tone.       Significant Labs:   Microbiology Results (last 7 days)       Procedure Component Value Units Date/Time    Blood culture [[id number]] Collected: 03/10/23 0942    Order Status: Completed Specimen: Blood from Antecubital, Right Arm Updated: 03/11/23 1212     Blood Culture, Routine No Growth to date      No Growth to date    Blood culture [[id number]] Collected: 03/10/23 0943    Order Status: Completed Specimen: Blood from Peripheral, Right Hand Updated: 03/11/23 1212     Blood Culture, Routine No Growth to date      No Growth to date    Respiratory Infection Panel (PCR), Nasopharyngeal [[id number]]     Order Status: No result Specimen: Nasopharyngeal Swab     Culture, Respiratory with Gram Stain [[id number]]     Order Status: No result Specimen: Respiratory     Aerobic culture [[id number]] Collected: 03/10/23 1340    Order Status: Completed Specimen: Wound from Foot, Right Updated: 03/11/23 0758     Aerobic Bacterial Culture No growth    Narrative:      Right foot tma site wound    Blood culture [[id number]] Collected: 03/08/23 2325    Order Status: Completed Specimen: Blood from Peripheral, Lower Arm, Right Updated: 03/11/23 0613     Blood Culture, Routine No Growth to date      No  Growth to date      No Growth to date    Narrative:      Right Peripheral    Blood culture [[id number]] Collected: 03/08/23 2325    Order Status: Completed Specimen: Blood from Peripheral, Lower Arm, Left Updated: 03/11/23 0613     Blood Culture, Routine No Growth to date      No Growth to date      No Growth to date    Narrative:      Left Peripheral    Gram stain [[id number]] Collected: 03/10/23 1340    Order Status: Completed Specimen: Wound from Foot, Right Updated: 03/10/23 1924     Gram Stain Result Rare WBC's      No organisms seen    Narrative:      Right foot tma site wound    Clostridium difficile EIA [[id number]]     Order Status: Canceled Specimen: Stool     Culture, Anaerobe [[id number]] Collected: 03/10/23 1340    Order Status: Sent Specimen: Wound from Foot, Right Updated: 03/10/23 1345    Culture, Anaerobe [[id number]] Collected: 03/04/23 1141    Order Status: Completed Specimen: Incision site from Foot, Right Updated: 03/09/23 1435     Anaerobic Culture Culture in progress    Narrative:      3rd Metatarsal    Fungus culture [[id number]] Collected: 03/04/23 1141    Order Status: Completed Specimen: Incision site from Foot, Right Updated: 03/09/23 1018     Fungus (Mycology) Culture Culture in progress    Narrative:      3rd Metatarsal    Aerobic culture [[id number]] Collected: 03/04/23 1141    Order Status: Completed Specimen: Incision site from Foot, Right Updated: 03/07/23 0844     Aerobic Bacterial Culture No growth    Narrative:      3rd Metatarsal            Significant Imaging: I have reviewed all pertinent imaging results/findings within the past 24 hours.

## 2023-03-12 NOTE — ASSESSMENT & PLAN NOTE
Endocrinology consulted for BG management.   BG goal 140-180, currently in the 130s this morning    Plan:  - Continue Levemir (Insulin Detemir) 20 units daily  - Continue Novolog (Insulin Aspart) 7 units TIDWM   - Continue low dose sliding scale insulin  - BG checks AC/HS  - Hypoglycemia protocol in place  - If blood glucose greater than 300, please ask patient not to eat food or drink anything other than water until correctional insulin has brought it back below 250    ** Please notify Endocrine for any change and/or advance in diet**  ** Please call Endocrine for any BG related issues **    Discharge Planning:   TBD. Please notify endocrinology prior to discharge.

## 2023-03-12 NOTE — ASSESSMENT & PLAN NOTE
Not on AC outpatient.    - heparin gtt in replace of eliquis for now until Hb stable and no additional surgical intervention indicated  - was switched to eliquis but now holding AC on 3/11 due to drop in Hb. No evidence of active bleeding so will reassess H/H tomrrow and if stable will resume eliquis

## 2023-03-12 NOTE — PROGRESS NOTES
[facility]  Vascular Surgery  Progress Note    Patient Name: Nelsy Marino  MRN: [id number]  Admission Date: 2/27/2023  Primary Care Provider: Juan F Garay    Subjective:     Interval History: NAEON. Afebrile overnight. Hgb stable. WBC still increasing at 20 now. On linezolid now. Workup continues.     Post-Op Info:  Procedure(s) (LRB):  AMPUTATION, FOOT, TRANSMETATARSAL (Right)   8 Days Post-Op       Medications:  Continuous Infusions:  Scheduled Meds:   acetaminophen  1,000 mg Oral Q8H    amLODIPine  10 mg Oral Daily    aspirin  81 mg Oral Daily    atorvastatin  40 mg Oral QHS    clopidogreL  75 mg Oral Daily    dextromethorphan-guaiFENesin  mg  1 tablet Oral BID    divalproex  250 mg Oral Daily PM    gabapentin  100 mg Oral TID    insulin aspart U-100  0-5 Units Subcutaneous QID (AC & HS)    insulin aspart U-100  7 Units Subcutaneous TIDWM    insulin detemir U-100  20 Units Subcutaneous QHS    levalbuterol  1.25 mg Nebulization Q8H WA    linezolid  600 mg Oral Q12H    melatonin  6 mg Oral Nightly    methocarbamoL  500 mg Oral QID    mupirocin   Nasal BID     PRN Meds:sodium chloride, sodium chloride, benzonatate, dextrose 10%, dextrose 10%, dextrose 10%, dextrose 10%, dextrose, dextrose, dextrose, dextrose, glucagon (human recombinant), glucagon (human recombinant), glucagon (human recombinant), levalbuterol, naloxone, ondansetron, prochlorperazine, simethicone, sodium chloride 0.9%, traMADoL     Objective:     Vital Signs (Most Recent):  Temp: 98.1 °F (36.7 °C) (03/12/23 0548)  Pulse: 81 (03/12/23 0548)  Resp: 16 (03/12/23 0548)  BP: (!) 147/65 (03/12/23 0548)  SpO2: (!) 93 % (03/12/23 0548)   Vital Signs (24h Range):  Temp:  [97.9 °F (36.6 °C)-99.3 °F (37.4 °C)] 98.1 °F (36.7 °C)  Pulse:  [76-99] 81  Resp:  [16-18] 16  SpO2:  [92 %-100 %] 93 %  BP: (119-159)/(56-88) 147/65       Physical Exam  Vitals reviewed.   Constitutional:       Appearance: Normal appearance.   HENT:      Head:  Normocephalic.      Mouth/Throat:      Mouth: Mucous membranes are moist.   Eyes:      Pupils: Pupils are equal, round, and reactive to light.   Cardiovascular:      Rate and Rhythm: Normal rate and regular rhythm.      Comments: R biphasic DP and PT   L monophasic DP and biphasic PT  Pulmonary:      Effort: Pulmonary effort is normal.      Breath sounds: Wheezing present.   Abdominal:      General: Abdomen is flat.      Palpations: Abdomen is soft.   Musculoskeletal:         General: Normal range of motion.      Cervical back: Normal range of motion.   Skin:     General: Skin is warm and dry.      Capillary Refill: Capillary refill takes less than 2 seconds.      Comments: R thigh incision with dressing - c/d/i  L groin dressing- c/d/i  R foot s/p TMA, no purulence or erythema    Neurological:      General: No focal deficit present.      Mental Status: She is alert.   Psychiatric:         Mood and Affect: Mood normal.     Significant Labs:  CBC:   Recent Labs   Lab 03/11/23  0425   WBC 18.51*   RBC 2.31*   HGB 5.9*   HCT 19.7*      MCV 85   MCH 25.5*   MCHC 29.9*     CMP:   Recent Labs   Lab 03/11/23  0425   *   CALCIUM 9.2   ALBUMIN 2.0*   PROT 6.9      K 4.3   CO2 29   CL 98   BUN 54*   CREATININE 1.5*   ALKPHOS 51*   ALT 17   AST 24   BILITOT 0.6       Significant Diagnostics:  I have reviewed all pertinent imaging results/findings within the past 24 hours.    Assessment/Plan:     * Critical limb ischemia with history of revascularization of same extremity  Nelsy Marino is a 82 y.o. lady with severe PAD    -s/p TMA 3/4  -Incisions stable, no sign of infection.  -UA cultures negative, blood cultures no growth to date, wound cultures NGTD  -On linezolid   -please have a low threshold to escalate antibiotics. If her graft becomes infected and requires antibiotics, she has no other options left for revascularizations  -Q4H neurovasc checks  -Continue diet  -ASA/plavix/Statin  -Ok to restart  eliquis   -PT/OT  -Podiatry following  -remainder of care per primary    Dispo: SNF  Follow up in two weeks with Bettina, ARCHIE/TBI, US Graft        Jomar Maciel MD  Vascular Surgery  [facility]

## 2023-03-12 NOTE — SUBJECTIVE & OBJECTIVE
"Interval HPI:   Overnight events:  No acute issues overnight.  BG in goal this AM.    Eatin- 50%  Nausea: No  Hypoglycemia and intervention: No  Fever: No  TPN and/or TF: No  If yes, type of TF/TPN and rate: N/A    BP (!) 147/65   Pulse 78   Temp 98.1 °F (36.7 °C)   Resp 18   Ht 5' 4" (1.626 m)   Wt 73.5 kg (162 lb 0.6 oz)   LMP  (LMP Unknown)   SpO2 (!) 93%   BMI 27.81 kg/m²     Labs Reviewed and Include    Recent Labs   Lab 23  0420   GLU 93   CALCIUM 9.1   ALBUMIN 2.1*   PROT 7.2      K 3.8   CO2 31*   CL 98   BUN 57*   CREATININE 1.2   ALKPHOS 68   ALT 17   AST 31   BILITOT 0.7     Lab Results   Component Value Date    WBC 20.05 (H) 2023    HGB 8.3 (L) 2023    HCT 26.1 (L) 2023    MCV 84 2023     (H) 2023     No results for input(s): TSH, FREET4 in the last 168 hours.  Lab Results   Component Value Date    HGBA1C 8.2 (H) 2023       Nutritional status:   Body mass index is 27.81 kg/m².  Lab Results   Component Value Date    ALBUMIN 2.1 (L) 2023    ALBUMIN 2.0 (L) 2023    ALBUMIN 2.0 (L) 03/10/2023     No results found for: PREALBUMIN    Estimated Creatinine Clearance: 35.5 mL/min (based on SCr of 1.2 mg/dL).    Accu-Checks  Recent Labs     03/10/23  1502 03/10/23  1939 03/10/23  2220 23  0818 23  1053 23  1208 23  1609 23  1708 23  2119 23  0800   POCTGLUCOSE 263* 200* 194* 174* 129* 135* 136* 156* 135* 139*       Current Medications and/or Treatments Impacting Glycemic Control  Immunotherapy:    Immunosuppressants       None          Steroids:   Hormones (From admission, onward)      Start     Stop Route Frequency Ordered    23 2100  melatonin tablet 6 mg         -- Oral Nightly 23 2586          Pressors:    Autonomic Drugs (From admission, onward)      None          Hyperglycemia/Diabetes Medications:   Antihyperglycemics (From admission, onward)      Start     Stop Route " Frequency Ordered    03/12/23 0645  insulin aspart U-100 pen 0-5 Units         -- SubQ Before meals & nightly 03/11/23 2255    03/10/23 2100  insulin detemir U-100 pen 20 Units         -- SubQ Nightly 03/10/23 0823    03/10/23 1130  insulin aspart U-100 pen 7 Units         -- SubQ 3 times daily with meals 03/10/23 0921

## 2023-03-12 NOTE — ASSESSMENT & PLAN NOTE
Nelsy Marino is a 82 y.o. lady with severe PAD    -s/p TMA 3/4  -Incisions stable, no sign of infection  -UA cultures negative, blood cultures no growth to date, wound cultures NGTD  -please have a low threshold to escalate antibiotics. If her graft becomes infected and requires antibiotics, she has no other options left for revascularizations  -Q4H neurovasc checks  -Continue diet  -ASA/plavix/Statin/Eliquis   -PT/OT  -Podiatry following  -remainder of care per primary    Dispo: SNF  Follow up in two weeks with Bettina, ARCHIE/TBI, US Graft

## 2023-03-12 NOTE — PROGRESS NOTES
[facility]  Infectious Disease  Progress Note    Patient Name: Nelsy Marino  MRN: [id number]  Admission Date: 2/27/2023  Length of Stay: 12 days  Attending Physician: Tanner Coello MD  Primary Care Provider: [facility]    Isolation Status: Special Contact  Assessment/Plan:      Pulmonary  Acute hypoxemic respiratory failure  Patient appears to have started requiring oxygen around when she received blood transfusions on march 2 and march 4.  Differential includes transfusion associated lung injury vs pulmonary edema from volume overload state.  Pulmonary embolism and HAP are possible but seem less likely as patient has been on anticoagulation and antibiotics since admission.      CT revealed nonspecific: Diffuse bilateral perihilar peribronchial patchy consolidative and ground-glass opacities.   Pt on vanc 2/27-3/8 and zosyn 2/27- 3/20 , so low suspicion for HAP. Unable to cough up sample for RCx.      Plan    Groin incisions with very mild erythema, so will add linezolid for possible SSTI and monitor for improvement.    Gentle diuresis.    Collect respiratory cultures from her. If she is able to cough.    If no improvement podiatry to consider re-exploration of foot incision.    ID  Vascular graft infection  Patient has a prior history of right groin wound infection following vascular bypass surgery in 2019.  She has been on cefalexin 500 mg po tid for life long suppression.  Continue this after she completes antibiotics for her right foot.        Anticipated Disposition: tbd    Thank you for your consult. I will follow-up with patient. Please contact us if you have any additional questions.    Danielle Severino MD  Infectious Disease  [facility]    Subjective:     Principal Problem:Critical limb ischemia with history of revascularization of same extremity    HPI: 82 year old female with a history of HTN, DMII, DVT, CHF, PAD, DVT and a history of prior fem-fem 20 years prior that  occluded s/p R iliofemoral bypass.  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  She is admitted for worsening right foot pain, swelling and discoloration following removal of an ingrown toe nail.  Patient was diagnosed with dry gangrene of the foot.  She underwent Left external iliac to right SFA bypass with 8mm PTFE on March 2.  She then underwent transmetatarsal amputation of the right foot on March 4.  Cultures from proximal bone margins were negative but the pathology is pending.  ID is consulted to assist with her care.         Interval History:   Afebrile since 3/8, but having uptrending leukocytosis. Vasc concerned graft may become infected if bacteremic. Has had 1 bm today, so cdiff not sent.     Review of Systems   Gastrointestinal:  Negative for abdominal pain and diarrhea.   All other systems reviewed and are negative.  Objective:     Vital Signs (Most Recent):  Temp: 98.7 °F (37.1 °C) (03/11/23 2011)  Pulse: 84 (03/11/23 2011)  Resp: 18 (03/11/23 2011)  BP: 139/78 (03/11/23 2011)  SpO2: (!) 94 % (03/11/23 2011) Vital Signs (24h Range):  Temp:  [97.9 °F (36.6 °C)-99.1 °F (37.3 °C)] 98.7 °F (37.1 °C)  Pulse:  [76-99] 84  Resp:  [16-18] 18  SpO2:  [94 %-100 %] 94 %  BP: (119-144)/(51-88) 139/78     Weight: 73.5 kg (162 lb 0.6 oz)  Body mass index is 27.81 kg/m².    Estimated Creatinine Clearance: 28.4 mL/min (A) (based on SCr of 1.5 mg/dL (H)).    Physical Exam  Vitals and nursing note reviewed.   Constitutional:       General: She is not in acute distress.     Appearance: She is well-developed. She is not diaphoretic.   HENT:      Head: Normocephalic and atraumatic.      Right Ear: External ear normal.      Left Ear: External ear normal.      Nose: Nose normal.      Mouth/Throat:      Pharynx: No oropharyngeal exudate.   Eyes:      General: No scleral icterus.        Right  eye: No discharge.         Left eye: No discharge.      Conjunctiva/sclera: Conjunctivae normal.      Pupils: Pupils are equal, round, and reactive to light.   Neck:      Thyroid: No thyromegaly.      Vascular: No JVD.      Trachea: No tracheal deviation.   Cardiovascular:      Rate and Rhythm: Normal rate and regular rhythm.      Heart sounds: No murmur heard.    No friction rub. No gallop.   Pulmonary:      Effort: Pulmonary effort is normal. No respiratory distress.      Breath sounds: Normal breath sounds. No stridor. No wheezing or rales.   Chest:      Chest wall: No tenderness.   Abdominal:      General: Bowel sounds are normal. There is no distension.      Palpations: Abdomen is soft. There is no mass.      Tenderness: There is no abdominal tenderness. There is no guarding or rebound.   Musculoskeletal:         General: Normal range of motion.      Cervical back: Normal range of motion and neck supple.      Comments: Right foot covering with surgical dressings. Pictures reviewed.    Mild erythema at clips on rt groin. Lt groin incision is mildly erythematous and becoming macerated distally. Now purulence.    Lymphadenopathy:      Cervical: No cervical adenopathy.   Skin:     General: Skin is warm.      Coloration: Skin is not pale.      Findings: No erythema or rash.   Neurological:      Mental Status: She is alert and oriented to person, place, and time.      Motor: No abnormal muscle tone.       Significant Labs:   Microbiology Results (last 7 days)       Procedure Component Value Units Date/Time    Blood culture [[id number]] Collected: 03/10/23 0942    Order Status: Completed Specimen: Blood from Antecubital, Right Arm Updated: 03/11/23 1212     Blood Culture, Routine No Growth to date      No Growth to date    Blood culture [[id number]] Collected: 03/10/23 0943    Order Status: Completed Specimen: Blood from Peripheral, Right Hand Updated: 03/11/23 1212     Blood Culture, Routine No Growth to date      No  Growth to date    Respiratory Infection Panel (PCR), Nasopharyngeal [[id number]]     Order Status: No result Specimen: Nasopharyngeal Swab     Culture, Respiratory with Gram Stain [[id number]]     Order Status: No result Specimen: Respiratory     Aerobic culture [[id number]] Collected: 03/10/23 1340    Order Status: Completed Specimen: Wound from Foot, Right Updated: 03/11/23 0758     Aerobic Bacterial Culture No growth    Narrative:      Right foot tma site wound    Blood culture [[id number]] Collected: 03/08/23 2325    Order Status: Completed Specimen: Blood from Peripheral, Lower Arm, Right Updated: 03/11/23 0613     Blood Culture, Routine No Growth to date      No Growth to date      No Growth to date    Narrative:      Right Peripheral    Blood culture [[id number]] Collected: 03/08/23 2325    Order Status: Completed Specimen: Blood from Peripheral, Lower Arm, Left Updated: 03/11/23 0613     Blood Culture, Routine No Growth to date      No Growth to date      No Growth to date    Narrative:      Left Peripheral    Gram stain [[id number]] Collected: 03/10/23 1340    Order Status: Completed Specimen: Wound from Foot, Right Updated: 03/10/23 1924     Gram Stain Result Rare WBC's      No organisms seen    Narrative:      Right foot tma site wound    Clostridium difficile EIA [[id number]]     Order Status: Canceled Specimen: Stool     Culture, Anaerobe [[id number]] Collected: 03/10/23 1340    Order Status: Sent Specimen: Wound from Foot, Right Updated: 03/10/23 1345    Culture, Anaerobe [[id number]] Collected: 03/04/23 1141    Order Status: Completed Specimen: Incision site from Foot, Right Updated: 03/09/23 1435     Anaerobic Culture Culture in progress    Narrative:      3rd Metatarsal    Fungus culture [[id number]] Collected: 03/04/23 1141    Order Status: Completed Specimen: Incision site from Foot, Right Updated: 03/09/23 1018     Fungus (Mycology) Culture Culture in progress    Narrative:      3rd Metatarsal    Aerobic  culture [[id number]] Collected: 03/04/23 1141    Order Status: Completed Specimen: Incision site from Foot, Right Updated: 03/07/23 0844     Aerobic Bacterial Culture No growth    Narrative:      3rd Metatarsal            Significant Imaging: I have reviewed all pertinent imaging results/findings within the past 24 hours.

## 2023-03-12 NOTE — ASSESSMENT & PLAN NOTE
Patient with Hypercapnic and Hypoxic Respiratory failure which is Acute on chronic.  she is not on home oxygen. Supplemental oxygen was provided and noted-  .   Signs/symptoms of respiratory failure include- tachypnea, increased work of breathing and lethargy. Contributing diagnoses includes - CHF, Obesity Hypoventilation and Pneumonia Labs and images were reviewed. Patient Has recent ABG, which has been reviewed. Will treat underlying causes and adjust management of respiratory failure.    PLAN:  - increased from 2 L NC to 12 L NC on 3/78  - now weaned to 4 L NC  - CXR showed pulmonary edema with no consolidation  - switched to xoponex due to tremors on duonebs  - incentive spirometry   - mucinex and prn tessalon perles  - chest PT and acapella   - OOB to chair  - IMPROVING

## 2023-03-12 NOTE — PLAN OF CARE
Problem: Adult Inpatient Plan of Care  Goal: Plan of Care Review  Outcome: Ongoing, Progressing  Flowsheets (Taken 3/12/2023 0715)  Plan of Care Reviewed With:   patient   spouse  Goal: Absence of Hospital-Acquired Illness or Injury  Outcome: Ongoing, Progressing  Intervention: Prevent Skin Injury  Flowsheets (Taken 3/12/2023 0715)  Body Position: position changed independently  Skin Protection:   incontinence pads utilized   protective footwear used   skin sealant/moisture barrier applied   tubing/devices free from skin contact  Goal: Optimal Comfort and Wellbeing  Outcome: Ongoing, Progressing  Intervention: Monitor Pain and Promote Comfort  Flowsheets (Taken 3/12/2023 0715)  Pain Management Interventions:   pain management plan reviewed with patient/caregiver   care clustered     Problem: Diabetes Comorbidity  Goal: Blood Glucose Level Within Targeted Range  Outcome: Ongoing, Progressing  Intervention: Monitor and Manage Glycemia  Flowsheets (Taken 3/12/2023 0715)  Glycemic Management: blood glucose monitored

## 2023-03-12 NOTE — SUBJECTIVE & OBJECTIVE
Interval History: Patient lying in bed, no acute distress. Family at bedside. Patient notes significant b/l lower leg pain poorly controlled with tramadol. BLE pain likely due to PAD, neuropathy and deconditioning. Added gabapentin. Ordered to additional unit of pRBC due to anemia. Holding home Eliquis for now and will resume AC if H/H remains stable over the weekend.     WBC continues to uptrend now to 18. Per vascular surgery, continuing wound care for right TMA and no evidence of active infection at the surgical site. Per ID, started linezolid to cover possible SSTI. CT C/A/P negative for infectious source and patient treated with zosyn previously this hospital stay. Also will try to obtain respiratory cultures and RIP. Per podiatry, if no clear infectious source identified and WBC continues to rise, will consider taking patient to OR to re-assess the TMA. Holding IV lasix for now due to rising Cr and bicarb. Weaned to 4 L NC.       Intake/Output Summary (Last 24 hours) at 3/11/2023 1903  Last data filed at 3/11/2023 1844  Gross per 24 hour   Intake 272 ml   Output 400 ml   Net -128 ml              Review of Systems   Constitutional:  Positive for activity change and fatigue. Negative for chills and fever.   HENT:  Negative for congestion.    Eyes:  Negative for visual disturbance.   Respiratory:  Positive for cough and shortness of breath.    Cardiovascular:  Positive for leg swelling. Negative for chest pain.   Gastrointestinal:  Negative for abdominal distention, abdominal pain, nausea and vomiting.   Genitourinary:  Negative for dysuria.   Musculoskeletal:  Positive for arthralgias (right foot) and myalgias (right foot). Negative for back pain.   Skin:  Negative for rash and wound.   Neurological:  Positive for weakness. Negative for dizziness.   Psychiatric/Behavioral:  Negative for confusion.      Objective:     Vital Signs (Most Recent):  Temp: 98.4 °F (36.9 °C) (03/11/23 1841)  Pulse: 77 (03/11/23  1841)  Resp: 18 (03/11/23 1841)  BP: 134/63 (03/11/23 1841)  SpO2: 97 % (03/11/23 1841)   Vital Signs (24h Range):  Temp:  [97.9 °F (36.6 °C)-99.1 °F (37.3 °C)] 98.4 °F (36.9 °C)  Pulse:  [76-99] 77  Resp:  [16-18] 18  SpO2:  [95 %-100 %] 97 %  BP: (119-144)/(51-88) 134/63     Weight: 73.5 kg (162 lb 0.6 oz)  Body mass index is 27.81 kg/m².    Intake/Output Summary (Last 24 hours) at 3/11/2023 1858  Last data filed at 3/11/2023 1844  Gross per 24 hour   Intake 272 ml   Output 400 ml   Net -128 ml        Physical Exam  Constitutional:       Appearance: Normal appearance.   HENT:      Head: Normocephalic.      Mouth/Throat:      Mouth: Mucous membranes are moist.   Eyes:      Extraocular Movements: Extraocular movements intact.      Pupils: Pupils are equal, round, and reactive to light.   Neck:      Comments: JVD absent  Cardiovascular:      Rate and Rhythm: Normal rate and regular rhythm.      Pulses: Normal pulses.      Heart sounds: Normal heart sounds.   Pulmonary:      Effort: Respiratory distress present.      Breath sounds: Normal breath sounds.   Abdominal:      General: Bowel sounds are normal. There is no distension.      Palpations: Abdomen is soft.      Tenderness: There is no abdominal tenderness.   Musculoskeletal:         General: Normal range of motion.      Cervical back: Neck supple.      Right lower leg: Edema present.      Left lower leg: Edema present.      Comments: Right TMA with dressing c/d/I and surgical shoe in place   Neurological:      General: No focal deficit present.      Mental Status: She is alert.   Psychiatric:         Mood and Affect: Mood normal.         Cognition and Memory: Cognition is impaired. Memory is impaired.       Significant Labs: A1C:   Recent Labs   Lab 02/28/23  0549   HGBA1C 8.2*       Blood Culture:   Recent Labs   Lab 03/10/23  0942 03/10/23  0943   LABBLOO No Growth to date  No Growth to date No Growth to date  No Growth to date       CBC:   Recent Labs   Lab  03/10/23  0413 03/11/23  0425   WBC 15.46* 18.51*   HGB 6.4* 5.9*   HCT 20.6* 19.7*    443       CMP:   Recent Labs   Lab 03/10/23  0413 03/11/23  0425    138   K 3.9 4.3   CL 99 98   CO2 29 29   * 175*   BUN 46* 54*   CREATININE 1.4 1.5*   CALCIUM 8.8 9.2   PROT 6.7 6.9   ALBUMIN 2.0* 2.0*   BILITOT 0.6 0.6   ALKPHOS 64 51*   AST 17 24   ALT 11 17   ANIONGAP 8 11       Coagulation:   Recent Labs   Lab 03/11/23 0425   APTT 35.6*       Lactic Acid:   No results for input(s): LACTATE in the last 48 hours.    Magnesium:   Recent Labs   Lab 03/10/23  0413 03/11/23  0425   MG 2.2 2.3       POCT Glucose:   Recent Labs   Lab 03/11/23  1208 03/11/23  1609 03/11/23  1708   POCTGLUCOSE 135* 136* 156*           Significant Imaging: I have reviewed all pertinent imaging results/findings within the past 24 hours.

## 2023-03-12 NOTE — PROGRESS NOTES
[facility] - [facility]  Endocrinology  Progress Note    Admit Date: 2/27/2023     Reason for Consult: Management of T2DM, Hyperglycemia     Surgical Procedure and Date: s/p  Left external iliac to right SFA bypass with 8mm PTFE; Left common and external iliac artery angioplasty and stenting, 8mm Lifestent; Aortogram, pelvic angiogram; Right lower extremity angiogram; and Right SFA angioplasty with plain and drug coated 4mm balloon on 3/8/2023    Diabetes diagnosis year: approx 26 years ago    Home Diabetes Medications:  Glipizide 10 mg daily, Novolog-NPH 70/30 50 units BID (prior Metformin but no longer) - uncertain why both sulfonylurea and insulin    How often checking glucose at home? 1-3 x day   BG readings on regimen: 100-200  Hypoglycemia on the regimen?  Yes at times  Missed doses on regimen?  No    Diabetes Complications include:     Hyperglycemia and Diabetic dermatitis    Complicating diabetes co morbidities:   HTN; DVT; CHF    HPI: This is an 82-year-old female with a past medical history of severe PAD, hypertension, and insulin dependant type 2 diabetes, history of DVT who presents with right foot pain. Patient reports worsening right foot pain, swelling and discoloration that started after she had an ingrown toenail removed by her podiatrist.  She reports that her discoloration started since 2/7. Of note, the patient underwent an angiogram on 02/23 by vascular surgery. In the ED, she was tachycardic (112), and hypertensive.  Labs showed microcytic anemia (10.2), elevated sed rate (112), elevated CRP (56.5), elevated lactic acid (2.5).  Foot x-ray showed DJD and spurs on the calcaneus with no acute process. Vascular surgery recommended  transfer to [facility] for hybrid OR surgery 3/2/23. Now s/p  Left external iliac to right SFA bypass with 8mm PTFE; Left common and external iliac artery angioplasty and stenting, 8mm Lifestent; Aortogram, pelvic angiogram; Right lower extremity angiogram; and Right SFA angioplasty  "with plain and drug coated 4mm balloon on 3/8/2023. Endocrine consulted to manage hyperglycemia and type 2 diabetes. Primary requesting BG not exceed 180 mg/dL.      Lab Results   Component Value Date    HGBA1C 8.2 (H) 2023     On evaluation the patient confirms that she has been a diabetic for 26 years.  She is on both Glipizide and 70/30 insulin.  Managed by her PCP.  She denies any issues with hypoglycemia or symptoms concerning for this.  Family history of diabetes includes multiple sisters and brothers.        Interval HPI:   Overnight events:  No acute issues overnight.  BG in goal this AM.    Eatin- 50%  Nausea: No  Hypoglycemia and intervention: No  Fever: No  TPN and/or TF: No  If yes, type of TF/TPN and rate: N/A    BP (!) 147/65   Pulse 78   Temp 98.1 °F (36.7 °C)   Resp 18   Ht 5' 4" (1.626 m)   Wt 73.5 kg (162 lb 0.6 oz)   LMP  (LMP Unknown)   SpO2 (!) 93%   BMI 27.81 kg/m²     Labs Reviewed and Include    Recent Labs   Lab 23  0420   GLU 93   CALCIUM 9.1   ALBUMIN 2.1*   PROT 7.2      K 3.8   CO2 31*   CL 98   BUN 57*   CREATININE 1.2   ALKPHOS 68   ALT 17   AST 31   BILITOT 0.7     Lab Results   Component Value Date    WBC 20.05 (H) 2023    HGB 8.3 (L) 2023    HCT 26.1 (L) 2023    MCV 84 2023     (H) 2023     No results for input(s): TSH, FREET4 in the last 168 hours.  Lab Results   Component Value Date    HGBA1C 8.2 (H) 2023       Nutritional status:   Body mass index is 27.81 kg/m².  Lab Results   Component Value Date    ALBUMIN 2.1 (L) 2023    ALBUMIN 2.0 (L) 2023    ALBUMIN 2.0 (L) 03/10/2023     No results found for: PREALBUMIN    Estimated Creatinine Clearance: 35.5 mL/min (based on SCr of 1.2 mg/dL).    Accu-Checks  Recent Labs     03/10/23  1502 03/10/23  1939 03/10/23  2220 23  0818 23  1053 23  1208 23  1609 23  1708 23  2119 23  0800   POCTGLUCOSE 263* 200* 194* " 174* 129* 135* 136* 156* 135* 139*       Current Medications and/or Treatments Impacting Glycemic Control  Immunotherapy:    Immunosuppressants       None          Steroids:   Hormones (From admission, onward)      Start     Stop Route Frequency Ordered    03/09/23 2100  melatonin tablet 6 mg         -- Oral Nightly 03/09/23 1546          Pressors:    Autonomic Drugs (From admission, onward)      None          Hyperglycemia/Diabetes Medications:   Antihyperglycemics (From admission, onward)      Start     Stop Route Frequency Ordered    03/12/23 0645  insulin aspart U-100 pen 0-5 Units         -- SubQ Before meals & nightly 03/11/23 2255    03/10/23 2100  insulin detemir U-100 pen 20 Units         -- SubQ Nightly 03/10/23 0823    03/10/23 1130  insulin aspart U-100 pen 7 Units         -- SubQ 3 times daily with meals 03/10/23 0921            ASSESSMENT and PLAN    Cardiac/Vascular  * Critical limb ischemia with history of revascularization of same extremity  Optimize BG control to improve wound healing        Essential hypertension  Uncontrolled HTN can worsen insulin resistance.   Patient on an ARB per ADA guidelines.         Endocrine  Type 2 diabetes mellitus  Endocrinology consulted for BG management.   BG goal 140-180, currently in the 130s this morning    Plan:  - Continue Levemir (Insulin Detemir) 20 units daily  - Continue Novolog (Insulin Aspart) 7 units TIDWM   - Continue low dose sliding scale insulin  - BG checks AC/HS  - Hypoglycemia protocol in place  - If blood glucose greater than 300, please ask patient not to eat food or drink anything other than water until correctional insulin has brought it back below 250    ** Please notify Endocrine for any change and/or advance in diet**  ** Please call Endocrine for any BG related issues **    Discharge Planning:   TBD. Please notify endocrinology prior to discharge.            Kashmir Madrigal, DO  Endocrinology

## 2023-03-12 NOTE — SUBJECTIVE & OBJECTIVE
Medications:  Continuous Infusions:  Scheduled Meds:   acetaminophen  1,000 mg Oral Q8H    amLODIPine  10 mg Oral Daily    aspirin  81 mg Oral Daily    atorvastatin  40 mg Oral QHS    clopidogreL  75 mg Oral Daily    dextromethorphan-guaiFENesin  mg  1 tablet Oral BID    divalproex  250 mg Oral Daily PM    gabapentin  100 mg Oral TID    insulin aspart U-100  0-5 Units Subcutaneous QID (AC & HS)    insulin aspart U-100  7 Units Subcutaneous TIDWM    insulin detemir U-100  20 Units Subcutaneous QHS    levalbuterol  1.25 mg Nebulization Q8H WA    linezolid  600 mg Oral Q12H    melatonin  6 mg Oral Nightly    methocarbamoL  500 mg Oral QID    mupirocin   Nasal BID     PRN Meds:sodium chloride, sodium chloride, benzonatate, dextrose 10%, dextrose 10%, dextrose 10%, dextrose 10%, dextrose, dextrose, dextrose, dextrose, glucagon (human recombinant), glucagon (human recombinant), glucagon (human recombinant), levalbuterol, naloxone, ondansetron, prochlorperazine, simethicone, sodium chloride 0.9%, traMADoL     Objective:     Vital Signs (Most Recent):  Temp: 98.1 °F (36.7 °C) (03/12/23 0548)  Pulse: 81 (03/12/23 0548)  Resp: 16 (03/12/23 0548)  BP: (!) 147/65 (03/12/23 0548)  SpO2: (!) 93 % (03/12/23 0548)   Vital Signs (24h Range):  Temp:  [97.9 °F (36.6 °C)-99.3 °F (37.4 °C)] 98.1 °F (36.7 °C)  Pulse:  [76-99] 81  Resp:  [16-18] 16  SpO2:  [92 %-100 %] 93 %  BP: (119-159)/(56-88) 147/65       Physical Exam  Vitals reviewed.   Constitutional:       Appearance: Normal appearance.   HENT:      Head: Normocephalic.      Mouth/Throat:      Mouth: Mucous membranes are moist.   Eyes:      Pupils: Pupils are equal, round, and reactive to light.   Cardiovascular:      Rate and Rhythm: Normal rate and regular rhythm.      Comments: R biphasic DP and PT   L monophasic DP and biphasic PT  Pulmonary:      Effort: Pulmonary effort is normal.      Breath sounds: Wheezing present.   Abdominal:      General: Abdomen is flat.       Palpations: Abdomen is soft.   Musculoskeletal:         General: Normal range of motion.      Cervical back: Normal range of motion.   Skin:     General: Skin is warm and dry.      Capillary Refill: Capillary refill takes less than 2 seconds.      Comments: R thigh incision with dressing - c/d/i  L groin dressing- c/d/i  R foot s/p TMA, no purulence or erythema    Neurological:      General: No focal deficit present.      Mental Status: She is alert.   Psychiatric:         Mood and Affect: Mood normal.     Significant Labs:  CBC:   Recent Labs   Lab 03/11/23  0425   WBC 18.51*   RBC 2.31*   HGB 5.9*   HCT 19.7*      MCV 85   MCH 25.5*   MCHC 29.9*     CMP:   Recent Labs   Lab 03/11/23  0425   *   CALCIUM 9.2   ALBUMIN 2.0*   PROT 6.9      K 4.3   CO2 29   CL 98   BUN 54*   CREATININE 1.5*   ALKPHOS 51*   ALT 17   AST 24   BILITOT 0.6       Significant Diagnostics:  I have reviewed all pertinent imaging results/findings within the past 24 hours.

## 2023-03-12 NOTE — ASSESSMENT & PLAN NOTE
Patient appears to have started requiring oxygen around when she received blood transfusions on march 2 and march 4.  Differential includes transfusion associated lung injury vs pulmonary edema from volume overload state.  Pulmonary embolism and HAP are possible but seem less likely as patient has been on anticoagulation and antibiotics since admission.      CT revealed nonspecific: Diffuse bilateral perihilar peribronchial patchy consolidative and ground-glass opacities.   Pt on vanc 2/27-3/8 and zosyn 2/27- 3/20 , so low suspicion for HAP. Unable to cough up sample for RCx.      Plan    Groin incisions with very mild erythema, so will add linezolid for possible SSTI and monitor for improvement.    Gentle diuresis.    Collect respiratory cultures from her. If she is able to cough.    If no improvement podiatry to consider re-exploration of foot incision.

## 2023-03-13 PROBLEM — G93.40 ACUTE ENCEPHALOPATHY: Status: ACTIVE | Noted: 2023-03-13

## 2023-03-13 LAB
ALBUMIN SERPL BCP-MCNC: 2 G/DL (ref 3.5–5.2)
ALP SERPL-CCNC: 67 U/L (ref 55–135)
ALT SERPL W/O P-5'-P-CCNC: 20 U/L (ref 10–44)
ANION GAP SERPL CALC-SCNC: 12 MMOL/L (ref 8–16)
APTT BLDCRRT: 32.8 SEC (ref 21–32)
AST SERPL-CCNC: 39 U/L (ref 10–40)
BACTERIA SPEC ANAEROBE CULT: NORMAL
BASOPHILS # BLD AUTO: 0.02 K/UL (ref 0–0.2)
BASOPHILS NFR BLD: 0.1 % (ref 0–1.9)
BILIRUB SERPL-MCNC: 0.6 MG/DL (ref 0.1–1)
BNP SERPL-MCNC: 1381 PG/ML (ref 0–99)
BUN SERPL-MCNC: 63 MG/DL (ref 8–23)
CALCIUM SERPL-MCNC: 8.9 MG/DL (ref 8.7–10.5)
CHLORIDE SERPL-SCNC: 98 MMOL/L (ref 95–110)
CO2 SERPL-SCNC: 27 MMOL/L (ref 23–29)
CREAT SERPL-MCNC: 1.5 MG/DL (ref 0.5–1.4)
DIFFERENTIAL METHOD: ABNORMAL
EOSINOPHIL # BLD AUTO: 0.1 K/UL (ref 0–0.5)
EOSINOPHIL NFR BLD: 0.6 % (ref 0–8)
ERYTHROCYTE [DISTWIDTH] IN BLOOD BY AUTOMATED COUNT: 15.7 % (ref 11.5–14.5)
EST. GFR  (NO RACE VARIABLE): 34.6 ML/MIN/1.73 M^2
GLUCOSE SERPL-MCNC: 162 MG/DL (ref 70–110)
HCT VFR BLD AUTO: 26.2 % (ref 37–48.5)
HGB BLD-MCNC: 7.9 G/DL (ref 12–16)
IMM GRANULOCYTES # BLD AUTO: 0.19 K/UL (ref 0–0.04)
IMM GRANULOCYTES NFR BLD AUTO: 1 % (ref 0–0.5)
LYMPHOCYTES # BLD AUTO: 0.9 K/UL (ref 1–4.8)
LYMPHOCYTES NFR BLD: 4.4 % (ref 18–48)
MAGNESIUM SERPL-MCNC: 2.6 MG/DL (ref 1.6–2.6)
MCH RBC QN AUTO: 25.8 PG (ref 27–31)
MCHC RBC AUTO-ENTMCNC: 30.2 G/DL (ref 32–36)
MCV RBC AUTO: 86 FL (ref 82–98)
MONOCYTES # BLD AUTO: 1 K/UL (ref 0.3–1)
MONOCYTES NFR BLD: 5 % (ref 4–15)
NEUTROPHILS # BLD AUTO: 17.2 K/UL (ref 1.8–7.7)
NEUTROPHILS NFR BLD: 88.9 % (ref 38–73)
NRBC BLD-RTO: 0 /100 WBC
PHOSPHATE SERPL-MCNC: 4.6 MG/DL (ref 2.7–4.5)
PLATELET # BLD AUTO: 472 K/UL (ref 150–450)
PMV BLD AUTO: 10.3 FL (ref 9.2–12.9)
POCT GLUCOSE: 136 MG/DL (ref 70–110)
POCT GLUCOSE: 182 MG/DL (ref 70–110)
POCT GLUCOSE: 187 MG/DL (ref 70–110)
POCT GLUCOSE: 226 MG/DL (ref 70–110)
POTASSIUM SERPL-SCNC: 4.4 MMOL/L (ref 3.5–5.1)
PROT SERPL-MCNC: 7.4 G/DL (ref 6–8.4)
RBC # BLD AUTO: 3.06 M/UL (ref 4–5.4)
SODIUM SERPL-SCNC: 137 MMOL/L (ref 136–145)
WBC # BLD AUTO: 19.34 K/UL (ref 3.9–12.7)

## 2023-03-13 PROCEDURE — 84100 ASSAY OF PHOSPHORUS: CPT | Performed by: INTERNAL MEDICINE

## 2023-03-13 PROCEDURE — 99233 PR SUBSEQUENT HOSPITAL CARE,LEVL III: ICD-10-PCS | Mod: ,,, | Performed by: INTERNAL MEDICINE

## 2023-03-13 PROCEDURE — 25000003 PHARM REV CODE 250: Performed by: STUDENT IN AN ORGANIZED HEALTH CARE EDUCATION/TRAINING PROGRAM

## 2023-03-13 PROCEDURE — 63600175 PHARM REV CODE 636 W HCPCS: Performed by: STUDENT IN AN ORGANIZED HEALTH CARE EDUCATION/TRAINING PROGRAM

## 2023-03-13 PROCEDURE — 25000003 PHARM REV CODE 250: Performed by: INTERNAL MEDICINE

## 2023-03-13 PROCEDURE — 85730 THROMBOPLASTIN TIME PARTIAL: CPT | Performed by: STUDENT IN AN ORGANIZED HEALTH CARE EDUCATION/TRAINING PROGRAM

## 2023-03-13 PROCEDURE — 99900035 HC TECH TIME PER 15 MIN (STAT)

## 2023-03-13 PROCEDURE — 97110 THERAPEUTIC EXERCISES: CPT

## 2023-03-13 PROCEDURE — 99232 SBSQ HOSP IP/OBS MODERATE 35: CPT | Mod: GC,,, | Performed by: GENERAL ACUTE CARE HOSPITAL

## 2023-03-13 PROCEDURE — 99233 PR SUBSEQUENT HOSPITAL CARE,LEVL III: ICD-10-PCS | Mod: GC,,, | Performed by: INTERNAL MEDICINE

## 2023-03-13 PROCEDURE — 99232 PR SUBSEQUENT HOSPITAL CARE,LEVL II: ICD-10-PCS | Mod: GC,,, | Performed by: GENERAL ACUTE CARE HOSPITAL

## 2023-03-13 PROCEDURE — 27000221 HC OXYGEN, UP TO 24 HOURS

## 2023-03-13 PROCEDURE — 97530 THERAPEUTIC ACTIVITIES: CPT

## 2023-03-13 PROCEDURE — 25000242 PHARM REV CODE 250 ALT 637 W/ HCPCS: Performed by: INTERNAL MEDICINE

## 2023-03-13 PROCEDURE — 94761 N-INVAS EAR/PLS OXIMETRY MLT: CPT

## 2023-03-13 PROCEDURE — 94668 MNPJ CHEST WALL SBSQ: CPT

## 2023-03-13 PROCEDURE — 94664 DEMO&/EVAL PT USE INHALER: CPT

## 2023-03-13 PROCEDURE — 99233 SBSQ HOSP IP/OBS HIGH 50: CPT | Mod: GC,,, | Performed by: INTERNAL MEDICINE

## 2023-03-13 PROCEDURE — 27000646 HC AEROBIKA DEVICE

## 2023-03-13 PROCEDURE — 20600001 HC STEP DOWN PRIVATE ROOM

## 2023-03-13 PROCEDURE — 99233 SBSQ HOSP IP/OBS HIGH 50: CPT | Mod: ,,, | Performed by: INTERNAL MEDICINE

## 2023-03-13 PROCEDURE — 83735 ASSAY OF MAGNESIUM: CPT | Performed by: INTERNAL MEDICINE

## 2023-03-13 PROCEDURE — 83880 ASSAY OF NATRIURETIC PEPTIDE: CPT | Performed by: INTERNAL MEDICINE

## 2023-03-13 PROCEDURE — 85025 COMPLETE CBC W/AUTO DIFF WBC: CPT | Performed by: STUDENT IN AN ORGANIZED HEALTH CARE EDUCATION/TRAINING PROGRAM

## 2023-03-13 PROCEDURE — 80053 COMPREHEN METABOLIC PANEL: CPT | Performed by: STUDENT IN AN ORGANIZED HEALTH CARE EDUCATION/TRAINING PROGRAM

## 2023-03-13 PROCEDURE — 36415 COLL VENOUS BLD VENIPUNCTURE: CPT | Performed by: STUDENT IN AN ORGANIZED HEALTH CARE EDUCATION/TRAINING PROGRAM

## 2023-03-13 PROCEDURE — 94640 AIRWAY INHALATION TREATMENT: CPT

## 2023-03-13 RX ORDER — INSULIN ASPART 100 [IU]/ML
5 INJECTION, SOLUTION INTRAVENOUS; SUBCUTANEOUS
Status: DISCONTINUED | OUTPATIENT
Start: 2023-03-13 | End: 2023-03-13

## 2023-03-13 RX ORDER — INSULIN ASPART 100 [IU]/ML
4 INJECTION, SOLUTION INTRAVENOUS; SUBCUTANEOUS
Status: DISCONTINUED | OUTPATIENT
Start: 2023-03-13 | End: 2023-03-20 | Stop reason: HOSPADM

## 2023-03-13 RX ADMIN — LEVALBUTEROL 1.25 MG: 1.25 SOLUTION, CONCENTRATE RESPIRATORY (INHALATION) at 08:03

## 2023-03-13 RX ADMIN — INSULIN ASPART 2 UNITS: 100 INJECTION, SOLUTION INTRAVENOUS; SUBCUTANEOUS at 08:03

## 2023-03-13 RX ADMIN — GUAIFENESIN AND DEXTROMETHORPHAN HYDROBROMIDE 1 TABLET: 600; 30 TABLET, EXTENDED RELEASE ORAL at 08:03

## 2023-03-13 RX ADMIN — METHOCARBAMOL 500 MG: 500 TABLET ORAL at 04:03

## 2023-03-13 RX ADMIN — FUROSEMIDE 40 MG: 40 TABLET ORAL at 05:03

## 2023-03-13 RX ADMIN — ACETAMINOPHEN 650 MG: 325 TABLET ORAL at 02:03

## 2023-03-13 RX ADMIN — TRAMADOL HYDROCHLORIDE 50 MG: 50 TABLET, COATED ORAL at 06:03

## 2023-03-13 RX ADMIN — CLOPIDOGREL BISULFATE 75 MG: 75 TABLET ORAL at 08:03

## 2023-03-13 RX ADMIN — LINEZOLID 600 MG: 600 TABLET, FILM COATED ORAL at 08:03

## 2023-03-13 RX ADMIN — METHOCARBAMOL 500 MG: 500 TABLET ORAL at 12:03

## 2023-03-13 RX ADMIN — INSULIN ASPART 4 UNITS: 100 INJECTION, SOLUTION INTRAVENOUS; SUBCUTANEOUS at 11:03

## 2023-03-13 RX ADMIN — Medication 6 MG: at 08:03

## 2023-03-13 RX ADMIN — FUROSEMIDE 40 MG: 40 TABLET ORAL at 08:03

## 2023-03-13 RX ADMIN — METHOCARBAMOL 500 MG: 500 TABLET ORAL at 08:03

## 2023-03-13 RX ADMIN — ASPIRIN 81 MG: 81 TABLET, COATED ORAL at 08:03

## 2023-03-13 RX ADMIN — ATORVASTATIN CALCIUM 40 MG: 40 TABLET, FILM COATED ORAL at 08:03

## 2023-03-13 RX ADMIN — LEVALBUTEROL 1.25 MG: 1.25 SOLUTION, CONCENTRATE RESPIRATORY (INHALATION) at 03:03

## 2023-03-13 RX ADMIN — AMLODIPINE BESYLATE 10 MG: 10 TABLET ORAL at 08:03

## 2023-03-13 RX ADMIN — MEROPENEM 1 G: 1 INJECTION INTRAVENOUS at 08:03

## 2023-03-13 RX ADMIN — INSULIN DETEMIR 12 UNITS: 100 INJECTION, SOLUTION SUBCUTANEOUS at 11:03

## 2023-03-13 RX ADMIN — INSULIN ASPART 5 UNITS: 100 INJECTION, SOLUTION INTRAVENOUS; SUBCUTANEOUS at 08:03

## 2023-03-13 RX ADMIN — INSULIN ASPART 4 UNITS: 100 INJECTION, SOLUTION INTRAVENOUS; SUBCUTANEOUS at 04:03

## 2023-03-13 NOTE — ASSESSMENT & PLAN NOTE
- on 3/12, patient more somnolent and had tremors  - discontinued gabapentin and depakote  - Scr back to baseline so low suspicion for uremia   - continue antibiotic for possible infection

## 2023-03-13 NOTE — PROGRESS NOTES
[facility] - [facility]  Endocrinology  Progress Note    Admit Date: 2/27/2023     Reason for Consult: Management of T2DM, Hyperglycemia     Surgical Procedure and Date: s/p  Left external iliac to right SFA bypass with 8mm PTFE; Left common and external iliac artery angioplasty and stenting, 8mm Lifestent; Aortogram, pelvic angiogram; Right lower extremity angiogram; and Right SFA angioplasty with plain and drug coated 4mm balloon on 3/8/2023    Diabetes diagnosis year: approx 26 years ago    Home Diabetes Medications:  Glipizide 10 mg daily, Novolog-NPH 70/30 50 units BID (prior Metformin but no longer) - uncertain why both sulfonylurea and insulin    How often checking glucose at home? 1-3 x day   BG readings on regimen: 100-200  Hypoglycemia on the regimen?  Yes at times  Missed doses on regimen?  No    Diabetes Complications include:     Hyperglycemia and Diabetic dermatitis    Complicating diabetes co morbidities:   HTN; DVT; CHF    HPI: This is an 82-year-old female with a past medical history of severe PAD, hypertension, and insulin dependant type 2 diabetes, history of DVT who presents with right foot pain. Patient reports worsening right foot pain, swelling and discoloration that started after she had an ingrown toenail removed by her podiatrist.  She reports that her discoloration started since 2/7. Of note, the patient underwent an angiogram on 02/23 by vascular surgery. In the ED, she was tachycardic (112), and hypertensive.  Labs showed microcytic anemia (10.2), elevated sed rate (112), elevated CRP (56.5), elevated lactic acid (2.5).  Foot x-ray showed DJD and spurs on the calcaneus with no acute process. Vascular surgery recommended  [facility] 3/2/23. Now s/p  Left external iliac to right SFA bypass with 8mm PTFE; Left common and external iliac artery angioplasty and stenting, 8mm Lifestent; Aortogram, pelvic angiogram; Right lower extremity angiogram; and Right SFA angioplasty  "with plain and drug coated 4mm balloon on 3/8/2023. Endocrine consulted to manage hyperglycemia and type 2 diabetes. Primary requesting BG not exceed 180 mg/dL.      Lab Results   Component Value Date    HGBA1C 8.2 (H) 02/28/2023     On evaluation the patient confirms that she has been a diabetic for 26 years.  She is on both Glipizide and 70/30 insulin.  Managed by her PCP.  She denies any issues with hypoglycemia or symptoms concerning for this.  Family history of diabetes includes multiple sisters and brothers.        Interval HPI:   - Rapid Response called overnight for decreased responsiveness and increased work of breathing. Her blood sugar at the time was 100 mg/dl as per documentation. She did not receive basal insulin last night  - Blood sugars mostly at goal this morning    BP (!) 123/58 (BP Location: Left arm, Patient Position: Lying)   Pulse 81   Temp 97.3 °F (36.3 °C) (Oral)   Resp 17   Ht 5' 4" (1.626 m)   Wt 73.5 kg (162 lb 0.6 oz)   LMP  (LMP Unknown)   SpO2 97%   BMI 27.81 kg/m²     Labs Reviewed and Include    Recent Labs   Lab 03/13/23  0809   *   CALCIUM 8.9   ALBUMIN 2.0*   PROT 7.4      K 4.4   CO2 27   CL 98   BUN 63*   CREATININE 1.5*   ALKPHOS 67   ALT 20   AST 39   BILITOT 0.6     Lab Results   Component Value Date    WBC 19.34 (H) 03/13/2023    HGB 7.9 (L) 03/13/2023    HCT 26.2 (L) 03/13/2023    MCV 86 03/13/2023     (H) 03/13/2023     No results for input(s): TSH, FREET4 in the last 168 hours.  Lab Results   Component Value Date    HGBA1C 8.2 (H) 02/28/2023       Nutritional status:   Body mass index is 27.81 kg/m².  Lab Results   Component Value Date    ALBUMIN 2.0 (L) 03/13/2023    ALBUMIN 2.1 (L) 03/12/2023    ALBUMIN 2.0 (L) 03/11/2023     No results found for: PREALBUMIN    Estimated Creatinine Clearance: 28.4 mL/min (A) (based on SCr of 1.5 mg/dL (H)).    Accu-Checks  Recent Labs     03/11/23  1708 03/11/23  2119 03/12/23  0800 03/12/23  0842 " 03/12/23  1017 03/12/23  1035 03/12/23  1441 03/12/23  1659 03/12/23  2003 03/13/23  0856   POCTGLUCOSE 156* 135* 139* 179* 216* 175* 141* 124* 100 226*       Current Medications and/or Treatments Impacting Glycemic Control  Immunotherapy:    Immunosuppressants       None          Steroids:   Hormones (From admission, onward)      Start     Stop Route Frequency Ordered    03/09/23 2100  melatonin tablet 6 mg         -- Oral Nightly 03/09/23 1546          Pressors:    Autonomic Drugs (From admission, onward)      None          Hyperglycemia/Diabetes Medications:   Antihyperglycemics (From admission, onward)      Start     Stop Route Frequency Ordered    03/13/23 2100  insulin detemir U-100 pen 18 Units         -- SubQ Nightly 03/13/23 0026    03/13/23 0715  insulin aspart U-100 pen 5 Units         -- SubQ 3 times daily with meals 03/13/23 0024    03/12/23 0645  insulin aspart U-100 pen 0-5 Units         -- SubQ Before meals & nightly 03/11/23 2255            ASSESSMENT and PLAN    Cardiac/Vascular  * Critical limb ischemia with history of revascularization of same extremity  - Optimize blood glucose control to improve wound healing      Endocrine  Type 2 diabetes mellitus  - Blood glucose goal 140-180  - Did not receive basal insulin last night, blood sugars mostly at goal  - Had rapid response last night due to decrease responsiveness    Plan:  - Levemir 12 units daily  - Aspart 4 units before meals  - Low dose sliding scale insulin  - BG checks AC/HS and at 2 am  - Hypoglycemia protocol in place  - If blood glucose greater than 300, please ask patient not to eat food or drink anything other than water until correctional insulin has brought it back below 250  - Please notify Endocrine for any change and/or advance in diet        Aditya Farrell MD  Endocrinology  [facility]

## 2023-03-13 NOTE — ASSESSMENT & PLAN NOTE
-Vascular surgery consult. apprec recs  -- s/p fem fem bypass on 3/2/2023  -- s/p right TMA on 3/4/2023.   --No concern for surgical incision infection at this time.  -- low threshold to escalate antibiotics. If her graft becomes infected and requires antibiotics, she has no other options left for revascularizations. ID following  -- Q4H neurovasc checks  -- ASA/plavix/Statin/Eliquis. Hepatin gtt in replace of eliquis for now due to worsening anemia. Discontinued AC due to drop in Hb.   -- PT/OT  --Recommend PRBC transfusion  --Consider GI eval or imaging   --UA cultures negative, blood cultures no growth to date, wound cultures NGTD, cdiff pending.  -Podiatry consulted. apprec recs  --Dressing changes by nursing.  --RLE WB to heel in surgical shoe for transfers or short distances.   --Elevate RLE at all times while in bed or chair.  -- Continuing to monitor right TMA site. If foot worsens or no other source is found then may consider opening of incision and exploration of surgical wound   - outpatient podiatry wound care instructions when patient medically ready for discharge: Please order ambulatory referral to podiatry for outpatient followup. RLE WB to heel in surgical shoe for transfers or short distances. HH/facility dressing changes R foot 3x/week: cleanse incision with saline, apply betadine soaked adaptic, apply gauze, wrap with cast padding and coban.

## 2023-03-13 NOTE — ASSESSMENT & PLAN NOTE
Patient with Hypercapnic and Hypoxic Respiratory failure which is Acute on chronic.  she is not on home oxygen. Supplemental oxygen was provided and noted-  .   Signs/symptoms of respiratory failure include- tachypnea, increased work of breathing and lethargy. Contributing diagnoses includes - CHF, Obesity Hypoventilation and Pneumonia Labs and images were reviewed. Patient Has recent ABG, which has been reviewed. Will treat underlying causes and adjust management of respiratory failure.    PLAN:  - increased from 2 L NC to 12 L NC on 3/78  - now weaned to 2 L NC  - CXR showed pulmonary edema with no consolidation  - switched to xoponex due to tremors on duonebs  - incentive spirometry   - mucinex and prn tessalon perles  - chest PT and acapella   - OOB to chair  - IMPROVING

## 2023-03-13 NOTE — ASSESSMENT & PLAN NOTE
- Blood glucose goal 140-180  - Did not receive basal insulin last night, blood sugars mostly at goal  - Had rapid response last night due to decrease responsiveness    Plan:  - Levemir 12 units daily  - Aspart 4 units before meals  - Low dose sliding scale insulin  - BG checks AC/HS and at 2 am  - Hypoglycemia protocol in place  - If blood glucose greater than 300, please ask patient not to eat food or drink anything other than water until correctional insulin has brought it back below 250  - Please notify Endocrine for any change and/or advance in diet

## 2023-03-13 NOTE — ASSESSMENT & PLAN NOTE
Hx of R iliofemoral bypass 3/ 2019 c/b wound dehiscence requiring washour 4/2019 and R groin infection following a vascular bypass surgery that takes lifelong cephalexin suppression (indication: vascular graft retained + proximity of abscess to graft) Continue this after she completes antibiotics for her right foot.

## 2023-03-13 NOTE — SUBJECTIVE & OBJECTIVE
Subjective:     Interval History: Vitals stable. No new pedal complaints. Dressings clean dry, intact. WBC stabilized.       Follow-up For: Procedure(s) (LRB):  AMPUTATION, FOOT, TRANSMETATARSAL (Right)    Post-Operative Day: 9 Days Post-Op    Scheduled Meds:   amLODIPine  10 mg Oral Daily    aspirin  81 mg Oral Daily    atorvastatin  40 mg Oral QHS    clopidogreL  75 mg Oral Daily    dextromethorphan-guaiFENesin  mg  1 tablet Oral BID    furosemide  40 mg Oral BID    insulin aspart U-100  0-5 Units Subcutaneous QID (AC & HS)    insulin aspart U-100  4 Units Subcutaneous TIDWM    insulin detemir U-100  12 Units Subcutaneous Daily    levalbuterol  1.25 mg Nebulization Q8H WA    linezolid  600 mg Oral Q12H    melatonin  6 mg Oral Nightly    methocarbamoL  500 mg Oral QID     Continuous Infusions:  PRN Meds:sodium chloride, sodium chloride, acetaminophen, benzonatate, dextrose 10%, dextrose 10%, dextrose 10%, dextrose 10%, dextrose, dextrose, dextrose, dextrose, glucagon (human recombinant), glucagon (human recombinant), glucagon (human recombinant), levalbuterol, naloxone, ondansetron, prochlorperazine, simethicone, sodium chloride 0.9%, traMADoL    Review of Systems   Constitutional:  Negative for chills and fever.   HENT:  Negative for hearing loss and trouble swallowing.    Eyes:  Negative for discharge and visual disturbance.   Respiratory:  Negative for chest tightness and shortness of breath.    Cardiovascular:  Negative for chest pain and palpitations.   Gastrointestinal:  Negative for abdominal distention, abdominal pain, nausea and vomiting.   Genitourinary:  Negative for difficulty urinating and hematuria.   Musculoskeletal:  Negative for arthralgias and back pain.   Skin:  Positive for color change and wound. Negative for rash.   Neurological:  Negative for dizziness and headaches.   Objective:     Vital Signs (Most Recent):  Temp: 97.3 °F (36.3 °C) (03/13/23 0851)  Pulse: 81 (03/13/23 0851)  Resp:  17 (03/13/23 0851)  BP: (!) 123/58 (03/13/23 0851)  SpO2: 97 % (03/13/23 0851)   Vital Signs (24h Range):  Temp:  [97.3 °F (36.3 °C)-99.4 °F (37.4 °C)] 97.3 °F (36.3 °C)  Pulse:  [73-83] 81  Resp:  [16-20] 17  SpO2:  [92 %-97 %] 97 %  BP: (123-152)/(58-72) 123/58     Weight: 73.5 kg (162 lb 0.6 oz)  Body mass index is 27.81 kg/m².    Foot Exam    General  Orientation: alert and oriented to person, place, and time   Affect: appropriate       Right Foot/Ankle     Inspection and Palpation  Tenderness: (TMA site, mild)  Swelling: (TMA site, mild)  Skin Exam: drainage, maceration and ulcer (incision);     Neurovascular  Dorsalis pedis: doppler  Posterior tibial: doppler  Saphenous nerve sensation: diminished  Tibial nerve sensation: diminished  Superficial peroneal nerve sensation: diminished  Deep peroneal nerve sensation: diminished  Sural nerve sensation: diminished    Comments  S/p right tma. Sutures are intact, 2 sutures previously removed at site of drainage. Mild serosanguinous drainage from central area of incision, with periwound maceration. Moderate tenderness throughout surgical site.     Left Foot/Ankle      Inspection and Palpation  Tenderness: none   Swelling: none   Skin Exam: skin intact;     Neurovascular  Dorsalis pedis: absent  Posterior tibial: doppler  Saphenous nerve sensation: diminished  Tibial nerve sensation: diminished  Superficial peroneal nerve sensation: diminished  Deep peroneal nerve sensation: diminished  Sural nerve sensation: diminished    Comments  No open wounds noted            Laboratory:  Blood Cultures: No results for input(s): LABBLOO in the last 48 hours.  CBC:   Recent Labs   Lab 03/13/23  0809   WBC 19.34*   RBC 3.06*   HGB 7.9*   HCT 26.2*   *   MCV 86   MCH 25.8*   MCHC 30.2*     CMP:   Recent Labs   Lab 03/13/23  0809   *   CALCIUM 8.9   ALBUMIN 2.0*   PROT 7.4      K 4.4   CO2 27   CL 98   BUN 63*   CREATININE 1.5*   ALKPHOS 67   ALT 20   AST 39    BILITOT 0.6     CRP:   Recent Labs   Lab 03/10/23  0942   .0*     ESR:   Recent Labs   Lab 03/10/23  0942   SEDRATE 64*     Microbiology Results (last 7 days)       Procedure Component Value Units Date/Time    Culture, Anaerobe [[id number]] Collected: 03/04/23 1141    Order Status: Completed Specimen: Incision site from Foot, Right Updated: 03/13/23 1009     Anaerobic Culture No anaerobes isolated    Narrative:      3rd Metatarsal    Blood culture [[id number]] Collected: 03/08/23 2325    Order Status: Completed Specimen: Blood from Peripheral, Lower Arm, Right Updated: 03/13/23 0612     Blood Culture, Routine No Growth to date      No Growth to date      No Growth to date      No Growth to date      No Growth to date    Narrative:      Right Peripheral    Blood culture [[id number]] Collected: 03/08/23 2325    Order Status: Completed Specimen: Blood from Peripheral, Lower Arm, Left Updated: 03/13/23 0612     Blood Culture, Routine No Growth to date      No Growth to date      No Growth to date      No Growth to date      No Growth to date    Narrative:      Left Peripheral    Culture, Anaerobe [[id number]] Collected: 03/10/23 1340    Order Status: Completed Specimen: Wound from Foot, Right Updated: 03/12/23 1336     Anaerobic Culture Culture in progress    Narrative:      Right foot tma site wound    Blood culture [[id number]] Collected: 03/10/23 0942    Order Status: Completed Specimen: Blood from Antecubital, Right Arm Updated: 03/12/23 1212     Blood Culture, Routine No Growth to date      No Growth to date      No Growth to date    Blood culture [[id number]] Collected: 03/10/23 0943    Order Status: Completed Specimen: Blood from Peripheral, Right Hand Updated: 03/12/23 1212     Blood Culture, Routine No Growth to date      No Growth to date      No Growth to date    Respiratory Infection Panel (PCR), Nasopharyngeal [[id number]]     Order Status: No result Specimen: Nasopharyngeal Swab     Culture, Respiratory  with Gram Stain [[id number]]     Order Status: No result Specimen: Respiratory     Aerobic culture [[id number]] Collected: 03/10/23 1340    Order Status: Completed Specimen: Wound from Foot, Right Updated: 03/11/23 0758     Aerobic Bacterial Culture No growth    Narrative:      Right foot tma site wound    Gram stain [[id number]] Collected: 03/10/23 1340    Order Status: Completed Specimen: Wound from Foot, Right Updated: 03/10/23 1924     Gram Stain Result Rare WBC's      No organisms seen    Narrative:      Right foot tma site wound    Clostridium difficile EIA [[id number]]     Order Status: Canceled Specimen: Stool     Fungus culture [[id number]] Collected: 03/04/23 1141    Order Status: Completed Specimen: Incision site from Foot, Right Updated: 03/09/23 1018     Fungus (Mycology) Culture Culture in progress    Narrative:      3rd Metatarsal    Aerobic culture [[id number]] Collected: 03/04/23 1141    Order Status: Completed Specimen: Incision site from Foot, Right Updated: 03/07/23 0844     Aerobic Bacterial Culture No growth    Narrative:      3rd Metatarsal          Specimen (24h ago, onward)      None

## 2023-03-13 NOTE — PROGRESS NOTES
[facility]  Podiatry  Progress Note    Patient Name: Nelsy Marino  MRN: [id number]  Admission Date: 2/27/2023  Hospital Length of Stay: 14 days  Attending Physician: Tanner Coello MD  Primary Care Provider: [facility]     Subjective:     Interval History: Vitals stable. No new pedal complaints. Dressings clean dry, intact. WBC stabilized.       Follow-up For: Procedure(s) (LRB):  AMPUTATION, FOOT, TRANSMETATARSAL (Right)    Post-Operative Day: 9 Days Post-Op    Scheduled Meds:   amLODIPine  10 mg Oral Daily    aspirin  81 mg Oral Daily    atorvastatin  40 mg Oral QHS    clopidogreL  75 mg Oral Daily    dextromethorphan-guaiFENesin  mg  1 tablet Oral BID    furosemide  40 mg Oral BID    insulin aspart U-100  0-5 Units Subcutaneous QID (AC & HS)    insulin aspart U-100  4 Units Subcutaneous TIDWM    insulin detemir U-100  12 Units Subcutaneous Daily    levalbuterol  1.25 mg Nebulization Q8H WA    linezolid  600 mg Oral Q12H    melatonin  6 mg Oral Nightly    methocarbamoL  500 mg Oral QID     Continuous Infusions:  PRN Meds:sodium chloride, sodium chloride, acetaminophen, benzonatate, dextrose 10%, dextrose 10%, dextrose 10%, dextrose 10%, dextrose, dextrose, dextrose, dextrose, glucagon (human recombinant), glucagon (human recombinant), glucagon (human recombinant), levalbuterol, naloxone, ondansetron, prochlorperazine, simethicone, sodium chloride 0.9%, traMADoL    Review of Systems   Constitutional:  Negative for chills and fever.   HENT:  Negative for hearing loss and trouble swallowing.    Eyes:  Negative for discharge and visual disturbance.   Respiratory:  Negative for chest tightness and shortness of breath.    Cardiovascular:  Negative for chest pain and palpitations.   Gastrointestinal:  Negative for abdominal distention, abdominal pain, nausea and vomiting.   Genitourinary:  Negative for difficulty urinating and hematuria.   Musculoskeletal:  Negative for arthralgias and  back pain.   Skin:  Positive for color change and wound. Negative for rash.   Neurological:  Negative for dizziness and headaches.   Objective:     Vital Signs (Most Recent):  Temp: 97.3 °F (36.3 °C) (03/13/23 0851)  Pulse: 81 (03/13/23 0851)  Resp: 17 (03/13/23 0851)  BP: (!) 123/58 (03/13/23 0851)  SpO2: 97 % (03/13/23 0851)   Vital Signs (24h Range):  Temp:  [97.3 °F (36.3 °C)-99.4 °F (37.4 °C)] 97.3 °F (36.3 °C)  Pulse:  [73-83] 81  Resp:  [16-20] 17  SpO2:  [92 %-97 %] 97 %  BP: (123-152)/(58-72) 123/58     Weight: 73.5 kg (162 lb 0.6 oz)  Body mass index is 27.81 kg/m².    Foot Exam    General  Orientation: alert and oriented to person, place, and time   Affect: appropriate       Right Foot/Ankle     Inspection and Palpation  Tenderness: (TMA site, mild)  Swelling: (TMA site, mild)  Skin Exam: drainage, maceration and ulcer (incision);     Neurovascular  Dorsalis pedis: doppler  Posterior tibial: doppler  Saphenous nerve sensation: diminished  Tibial nerve sensation: diminished  Superficial peroneal nerve sensation: diminished  Deep peroneal nerve sensation: diminished  Sural nerve sensation: diminished    Comments  S/p right tma. Sutures are intact, 2 sutures previously removed at site of drainage. Mild serosanguinous drainage from central area of incision, with periwound maceration. Moderate tenderness throughout surgical site.     Left Foot/Ankle      Inspection and Palpation  Tenderness: none   Swelling: none   Skin Exam: skin intact;     Neurovascular  Dorsalis pedis: absent  Posterior tibial: doppler  Saphenous nerve sensation: diminished  Tibial nerve sensation: diminished  Superficial peroneal nerve sensation: diminished  Deep peroneal nerve sensation: diminished  Sural nerve sensation: diminished    Comments  No open wounds noted            Laboratory:  Blood Cultures: No results for input(s): LABBLOO in the last 48 hours.  CBC:   Recent Labs   Lab 03/13/23 0809   WBC 19.34*   RBC 3.06*   HGB 7.9*    HCT 26.2*   *   MCV 86   MCH 25.8*   MCHC 30.2*     CMP:   Recent Labs   Lab 03/13/23  0809   *   CALCIUM 8.9   ALBUMIN 2.0*   PROT 7.4      K 4.4   CO2 27   CL 98   BUN 63*   CREATININE 1.5*   ALKPHOS 67   ALT 20   AST 39   BILITOT 0.6     CRP:   Recent Labs   Lab 03/10/23  0942   .0*     ESR:   Recent Labs   Lab 03/10/23  0942   SEDRATE 64*     Microbiology Results (last 7 days)       Procedure Component Value Units Date/Time    Culture, Anaerobe [[id number]] Collected: 03/04/23 1141    Order Status: Completed Specimen: Incision site from Foot, Right Updated: 03/13/23 1009     Anaerobic Culture No anaerobes isolated    Narrative:      3rd Metatarsal    Blood culture [[id number]] Collected: 03/08/23 2325    Order Status: Completed Specimen: Blood from Peripheral, Lower Arm, Right Updated: 03/13/23 0612     Blood Culture, Routine No Growth to date      No Growth to date      No Growth to date      No Growth to date      No Growth to date    Narrative:      Right Peripheral    Blood culture [[id number]] Collected: 03/08/23 2325    Order Status: Completed Specimen: Blood from Peripheral, Lower Arm, Left Updated: 03/13/23 0612     Blood Culture, Routine No Growth to date      No Growth to date      No Growth to date      No Growth to date      No Growth to date    Narrative:      Left Peripheral    Culture, Anaerobe [[id number]] Collected: 03/10/23 1340    Order Status: Completed Specimen: Wound from Foot, Right Updated: 03/12/23 1336     Anaerobic Culture Culture in progress    Narrative:      Right foot tma site wound    Blood culture [[id number]] Collected: 03/10/23 0942    Order Status: Completed Specimen: Blood from Antecubital, Right Arm Updated: 03/12/23 1212     Blood Culture, Routine No Growth to date      No Growth to date      No Growth to date    Blood culture [[id number]] Collected: 03/10/23 0943    Order Status: Completed Specimen: Blood from Peripheral, Right Hand Updated: 03/12/23  1212     Blood Culture, Routine No Growth to date      No Growth to date      No Growth to date    Respiratory Infection Panel (PCR), Nasopharyngeal [[id number]]     Order Status: No result Specimen: Nasopharyngeal Swab     Culture, Respiratory with Gram Stain [[id number]]     Order Status: No result Specimen: Respiratory     Aerobic culture [[id number]] Collected: 03/10/23 1340    Order Status: Completed Specimen: Wound from Foot, Right Updated: 03/11/23 0758     Aerobic Bacterial Culture No growth    Narrative:      Right foot tma site wound    Gram stain [[id number]] Collected: 03/10/23 1340    Order Status: Completed Specimen: Wound from Foot, Right Updated: 03/10/23 1924     Gram Stain Result Rare WBC's      No organisms seen    Narrative:      Right foot tma site wound    Clostridium difficile EIA [[id number]]     Order Status: Canceled Specimen: Stool     Fungus culture [[id number]] Collected: 03/04/23 1141    Order Status: Completed Specimen: Incision site from Foot, Right Updated: 03/09/23 1018     Fungus (Mycology) Culture Culture in progress    Narrative:      3rd Metatarsal    Aerobic culture [[id number]] Collected: 03/04/23 1141    Order Status: Completed Specimen: Incision site from Foot, Right Updated: 03/07/23 0844     Aerobic Bacterial Culture No growth    Narrative:      3rd Metatarsal          Specimen (24h ago, onward)      None                Assessment/Plan:     Orthopedic  S/P transmetatarsal amputation of foot, right  Plan:  -Continuing to monitor right TMA site. If WBCs continue to rise with no other source or if right foot worsens then may consider opening of incision and exploration of surgical wound. Patient would like to avoid opening the foot if possible, especially given the negative cultures from amputation and from recent I&D/suture removal.   -Antibiotic plan per ID.  -Appreciate vascular surgery recs.  -Dressing changes by nursing.  -RLE WB to heel in surgical shoe for transfers or short  distances.   -Elevate RLE at all times while in bed or chair.  -Podiatry will follow.         Lalo Pollock DPM PGY-3  Podiatric Medicine & Surgery  [facility]  Secure Chat Preferred  Mobile: [phone]  Pager: [phone]

## 2023-03-13 NOTE — ASSESSMENT & PLAN NOTE
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    She underwent left external iliac to R SFA bypass with PTFE, left common + ext iliac artery stenting, R SFA angioplasty with drug coated balloon on 3/8/23.     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 Afebrile, but leukocytosis continues to rise, TRALI  and transfusion related symptoms are in the differential.   Recommendations    -Evaluate R groin incision site for underlying infection with ultrasound  -Continue Linezolid monotherapy for SSTI coverage  -No clear evidence that there is residual bone infection- would not pursue treating this, will follow up on pathology of bone margins.

## 2023-03-13 NOTE — ASSESSMENT & PLAN NOTE
Not on AC outpatient.    - heparin gtt in replace of eliquis for now until Hb stable and no additional surgical intervention indicated  - was switched to eliquis but now holding AC on 3/11 due to drop in Hb. No evidence of active bleeding so will reassess H/H tomrrow and if stable will resume eliquis.

## 2023-03-13 NOTE — SUBJECTIVE & OBJECTIVE
"Interval HPI:   - Rapid Response called overnight for decreased responsiveness and increased work of breathing. Her blood sugar at the time was 100 mg/dl as per documentation. She did not receive basal insulin last night  - Blood sugars mostly at goal this morning    BP (!) 123/58 (BP Location: Left arm, Patient Position: Lying)   Pulse 81   Temp 97.3 °F (36.3 °C) (Oral)   Resp 17   Ht 5' 4" (1.626 m)   Wt 73.5 kg (162 lb 0.6 oz)   LMP  (LMP Unknown)   SpO2 97%   BMI 27.81 kg/m²     Labs Reviewed and Include    Recent Labs   Lab 03/13/23  0809   *   CALCIUM 8.9   ALBUMIN 2.0*   PROT 7.4      K 4.4   CO2 27   CL 98   BUN 63*   CREATININE 1.5*   ALKPHOS 67   ALT 20   AST 39   BILITOT 0.6     Lab Results   Component Value Date    WBC 19.34 (H) 03/13/2023    HGB 7.9 (L) 03/13/2023    HCT 26.2 (L) 03/13/2023    MCV 86 03/13/2023     (H) 03/13/2023     No results for input(s): TSH, FREET4 in the last 168 hours.  Lab Results   Component Value Date    HGBA1C 8.2 (H) 02/28/2023       Nutritional status:   Body mass index is 27.81 kg/m².  Lab Results   Component Value Date    ALBUMIN 2.0 (L) 03/13/2023    ALBUMIN 2.1 (L) 03/12/2023    ALBUMIN 2.0 (L) 03/11/2023     No results found for: PREALBUMIN    Estimated Creatinine Clearance: 28.4 mL/min (A) (based on SCr of 1.5 mg/dL (H)).    Accu-Checks  Recent Labs     03/11/23  1708 03/11/23  2119 03/12/23  0800 03/12/23  0842 03/12/23  1017 03/12/23  1035 03/12/23  1441 03/12/23  1659 03/12/23 2003 03/13/23  0856   POCTGLUCOSE 156* 135* 139* 179* 216* 175* 141* 124* 100 226*       Current Medications and/or Treatments Impacting Glycemic Control  Immunotherapy:    Immunosuppressants       None          Steroids:   Hormones (From admission, onward)      Start     Stop Route Frequency Ordered    03/09/23 2100  melatonin tablet 6 mg         -- Oral Nightly 03/09/23 5876          Pressors:    Autonomic Drugs (From admission, onward)      None      "     Hyperglycemia/Diabetes Medications:   Antihyperglycemics (From admission, onward)      Start     Stop Route Frequency Ordered    03/13/23 2100  insulin detemir U-100 pen 18 Units         -- SubQ Nightly 03/13/23 0026    03/13/23 0715  insulin aspart U-100 pen 5 Units         -- SubQ 3 times daily with meals 03/13/23 0024    03/12/23 0645  insulin aspart U-100 pen 0-5 Units         -- SubQ Before meals & nightly 03/11/23 5800

## 2023-03-13 NOTE — PLAN OF CARE
A&Ox4. VVS on 3L NC. Tolerating diabetic diet well. Adequate UOP per purwick. 1000cc fluid restriction in place. Q4 neurovascular checks in place. Delirium precautions in place. Neurochecks Q4 in place, pt remains oriented. Up in chair this shift with full assist. R. Foot with compression wrap, toes amputated. L. Groin incision with tammie c/d/I. R. Groin incision with staples c/d/I. ACHS and bedtime maintained. Pt remains free from fall/injuries. POC reviewed with pt. Pt currently resting comfortably, bed in low position, call light in reach, family at bedside.   Problem: Adult Inpatient Plan of Care  Goal: Plan of Care Review  Outcome: Ongoing, Progressing     Problem: Fall Injury Risk  Goal: Absence of Fall and Fall-Related Injury  Outcome: Ongoing, Progressing

## 2023-03-13 NOTE — SUBJECTIVE & OBJECTIVE
Interval History: No new adverse events, denies fever, productive cough, diarrhea, dysuria, or significant worsening pain over incision sites, still with some RLE pain post op.     Review of Systems  Constitutional:  Positive for chills and fatigue. Negative for activity change, appetite change and fever.   Respiratory:  Negative for cough and shortness of breath.    Gastrointestinal:  Negative for abdominal distention, blood in stool and vomiting.   Genitourinary:  Negative for dysuria, hematuria and urgency.   Skin:  Positive for rash and wound.   Objective:     Vital Signs (Most Recent):  Temp: 99 °F (37.2 °C) (03/13/23 1228)  Pulse: 77 (03/13/23 1554)  Resp: (!) 21 (03/13/23 1554)  BP: (!) 129/59 (03/13/23 1228)  SpO2: 97 % (03/13/23 1624)   Vital Signs (24h Range):  Temp:  [97.3 °F (36.3 °C)-99.4 °F (37.4 °C)] 99 °F (37.2 °C)  Pulse:  [73-83] 77  Resp:  [16-21] 21  SpO2:  [92 %-97 %] 97 %  BP: (123-152)/(58-72) 129/59     Weight: 73.5 kg (162 lb 0.6 oz)  Body mass index is 27.81 kg/m².    Estimated Creatinine Clearance: 28.4 mL/min (A) (based on SCr of 1.5 mg/dL (H)).    Physical Exam  Constitutional:       Appearance: Normal appearance. She is ill-appearing. She is not toxic-appearing.   HENT:      Head: Normocephalic and atraumatic.      Nose: Nose normal.      Mouth/Throat:      Mouth: Mucous membranes are moist.      Pharynx: Oropharynx is clear.   Eyes:      Extraocular Movements: Extraocular movements intact.      Conjunctiva/sclera: Conjunctivae normal.      Pupils: Pupils are equal, round, and reactive to light.   Cardiovascular:      Rate and Rhythm: Normal rate and regular rhythm.      Pulses: Normal pulses.      Heart sounds: Normal heart sounds.   Pulmonary:      Effort: Pulmonary effort is normal.      Breath sounds: Normal breath sounds.   Abdominal:      General: Abdomen is flat. Bowel sounds are normal.      Palpations: Abdomen is soft.   Musculoskeletal:         General: Tenderness and  deformity present. No swelling.      Cervical back: Normal range of motion and neck supple.   Skin:     General: Skin is warm and dry.      Findings: Bruising and lesion present.      Comments: Right TMA with dressing c/d/I and surgical dressing in place, photos reviewed.     Mild erythema at incision site on L groin, with scant purulence     Neurological:      Mental Status: She is alert. Mental status is at baseline.   Psychiatric:         Behavior: Behavior normal.         Thought Content: Thought content normal.   Significant Labs: BMP:   Recent Labs   Lab 03/13/23  0809   *      K 4.4   CL 98   CO2 27   BUN 63*   CREATININE 1.5*   CALCIUM 8.9   MG 2.6     CBC:   Recent Labs   Lab 03/12/23  0420 03/12/23 2054 03/13/23  0809   WBC 20.05*  --  19.34*   HGB 8.3*  --  7.9*   HCT 26.1* 25* 26.2*   *  --  472*     CMP:   Recent Labs   Lab 03/12/23  0420 03/13/23  0809    137   K 3.8 4.4   CL 98 98   CO2 31* 27   GLU 93 162*   BUN 57* 63*   CREATININE 1.2 1.5*   CALCIUM 9.1 8.9   PROT 7.2 7.4   ALBUMIN 2.1* 2.0*   BILITOT 0.7 0.6   ALKPHOS 68 67   AST 31 39   ALT 17 20   ANIONGAP 11 12     Microbiology Results (last 7 days)       Procedure Component Value Units Date/Time    Aerobic culture [[id number]]  (Abnormal) Collected: 03/10/23 1340    Order Status: Completed Specimen: Wound from Foot, Right Updated: 03/13/23 1556     Aerobic Bacterial Culture [facility]  Few  Susceptibility pending      Narrative:      Right foot tma site wound    Blood culture [[id number]] Collected: 03/10/23 0942    Order Status: Completed Specimen: Blood from Antecubital, Right Arm Updated: 03/13/23 1212     Blood Culture, Routine No Growth to date      No Growth to date      No Growth to date      No Growth to date    Blood culture [[id number]] Collected: 03/10/23 0943    Order Status: Completed Specimen: Blood from Peripheral, Right Hand Updated: 03/13/23 1212     Blood Culture, Routine No Growth  to date      No Growth to date      No Growth to date      No Growth to date    Culture, Anaerobe [[id number]] Collected: 03/04/23 1141    Order Status: Completed Specimen: Incision site from Foot, Right Updated: 03/13/23 1009     Anaerobic Culture No anaerobes isolated    Narrative:      3rd Metatarsal    Blood culture [[id number]] Collected: 03/08/23 2325    Order Status: Completed Specimen: Blood from Peripheral, Lower Arm, Right Updated: 03/13/23 0612     Blood Culture, Routine No Growth to date      No Growth to date      No Growth to date      No Growth to date      No Growth to date    Narrative:      Right Peripheral    Blood culture [[id number]] Collected: 03/08/23 2325    Order Status: Completed Specimen: Blood from Peripheral, Lower Arm, Left Updated: 03/13/23 0612     Blood Culture, Routine No Growth to date      No Growth to date      No Growth to date      No Growth to date      No Growth to date    Narrative:      Left Peripheral    Culture, Anaerobe [[id number]] Collected: 03/10/23 1340    Order Status: Completed Specimen: Wound from Foot, Right Updated: 03/12/23 1336     Anaerobic Culture Culture in progress    Narrative:      Right foot tma site wound    Respiratory Infection Panel (PCR), Nasopharyngeal [[id number]]     Order Status: No result Specimen: Nasopharyngeal Swab     Culture, Respiratory with Gram Stain [[id number]]     Order Status: No result Specimen: Respiratory     Gram stain [[id number]] Collected: 03/10/23 1340    Order Status: Completed Specimen: Wound from Foot, Right Updated: 03/10/23 1924     Gram Stain Result Rare WBC's      No organisms seen    Narrative:      Right foot tma site wound    Clostridium difficile EIA [[id number]]     Order Status: Canceled Specimen: Stool     Fungus culture [[id number]] Collected: 03/04/23 1141    Order Status: Completed Specimen: Incision site from Foot, Right Updated: 03/09/23 1018     Fungus (Mycology) Culture Culture in progress    Narrative:      3rd  Metatarsal    Aerobic culture [[id number]] Collected: 03/04/23 1141    Order Status: Completed Specimen: Incision site from Foot, Right Updated: 03/07/23 0844     Aerobic Bacterial Culture No growth    Narrative:      3rd Metatarsal          Pathology Results  (Last 10 years)      None          All pertinent labs within the past 24 hours have been reviewed.  Recent Lab Results  (Last 5 results in the past 24 hours)        03/13/23  1607   03/13/23  1118   03/13/23  0856   03/13/23  0809   03/12/23  2054        Albumin       2.0         Alkaline Phosphatase       67         Allens Test         N/A       ALT       20         Anion Gap       12         aPTT       32.8  Comment: aPTT therapeutic range = 39-69 seconds         AST       39         Baso #       0.02         Basophil %       0.1         BILIRUBIN TOTAL       0.6  Comment: For infants and newborns, interpretation of results should be based  on gestational age, weight and in agreement with clinical  observations.    Premature Infant recommended reference ranges:  Up to 24 hours.............<8.0 mg/dL  Up to 48 hours............<12.0 mg/dL  3-5 days..................<15.0 mg/dL  6-29 days.................<15.0 mg/dL           BNP       1,381  Comment: Values of less than 100 pg/ml are consistent with non-CHF populations.         Site         Other       BUN       63         Calcium       8.9         Chloride       98         CO2       27         Creatinine       1.5         DelSys         Nasal Can       Differential Method       Automated         eGFR       34.6         Eos #       0.1         Eosinophil %       0.6         Glucose       162         Gran # (ANC)       17.2         Gran %       88.9         Hematocrit       26.2         Hemoglobin       7.9         Immature Grans (Abs)       0.19  Comment: Mild elevation in immature granulocytes is non specific and   can be seen in a variety of conditions including stress response,   acute inflammation, trauma  and pregnancy. Correlation with other   laboratory and clinical findings is essential.           Immature Granulocytes       1.0         Lymph #       0.9         Lymph %       4.4         Magnesium       2.6         MCH       25.8         MCHC       30.2         MCV       86         Mono #       1.0         Mono %       5.0         MPV       10.3         nRBC       0         Phosphorus       4.6         Platelets       472         POC BE         7       POC HCO3         31.2       POC Hematocrit         25       POC Ionized Calcium         1.11       POC PCO2         46.1       POC PH         7.438       POC PO2         45       POC Potassium         4.0       POC SATURATED O2         82       POC Sodium         139       POC TCO2         33       POCT Glucose 182   187   226           Potassium       4.4         PROTEIN TOTAL       7.4         RBC       3.06         RDW       15.7         Sample         VENOUS       Sodium       137         WBC       19.34                                Significant Imaging: I have reviewed all pertinent imaging results/findings within the past 24 hours.

## 2023-03-13 NOTE — PROGRESS NOTES
[facility] - [facility]  Vascular Surgery  Progress Note    Patient Name: Nelsy Marino  MRN: [id number]  Admission Date: 2/27/2023  Primary Care Provider: Juan F Garay    Subjective:     Interval History: Afebrile, VSS this morning.  Rapid called overnight for decreased responsiveness and increased work of breathing.  WBC continuing to uptrend; no concern for infection at left groin incision at this time.  ID recommending Rt TMA incision re-exploration if WBC does not downtrend on linezolid.     Post-Op Info:  Procedure(s) (LRB):  AMPUTATION, FOOT, TRANSMETATARSAL (Right)   9 Days Post-Op       Medications:  Continuous Infusions:  Scheduled Meds:   amLODIPine  10 mg Oral Daily    aspirin  81 mg Oral Daily    atorvastatin  40 mg Oral QHS    clopidogreL  75 mg Oral Daily    dextromethorphan-guaiFENesin  mg  1 tablet Oral BID    furosemide  40 mg Oral BID    insulin aspart U-100  0-5 Units Subcutaneous QID (AC & HS)    insulin aspart U-100  5 Units Subcutaneous TIDWM    insulin detemir U-100  18 Units Subcutaneous QHS    levalbuterol  1.25 mg Nebulization Q8H WA    linezolid  600 mg Oral Q12H    melatonin  6 mg Oral Nightly    methocarbamoL  500 mg Oral QID     PRN Meds:sodium chloride, sodium chloride, acetaminophen, benzonatate, dextrose 10%, dextrose 10%, dextrose 10%, dextrose 10%, dextrose, dextrose, dextrose, dextrose, glucagon (human recombinant), glucagon (human recombinant), glucagon (human recombinant), levalbuterol, naloxone, ondansetron, prochlorperazine, simethicone, sodium chloride 0.9%, traMADoL     Objective:     Vital Signs (Most Recent):  Temp: 98.9 °F (37.2 °C) (03/13/23 0526)  Pulse: 82 (03/13/23 0526)  Resp: 16 (03/13/23 0611)  BP: (!) 152/72 (03/13/23 0526)  SpO2: (!) 92 % (03/13/23 0526)   Vital Signs (24h Range):  Temp:  [98 °F (36.7 °C)-99.4 °F (37.4 °C)] 98.9 °F (37.2 °C)  Pulse:  [73-83] 82  Resp:  [16-20] 16  SpO2:  [92 %-98 %] 92 %  BP: (127-152)/(58-72) 152/72         Physical Exam  Vitals reviewed.   Constitutional:       Appearance: Normal appearance.   HENT:      Head: Normocephalic.      Mouth/Throat:      Mouth: Mucous membranes are moist.   Eyes:      Pupils: Pupils are equal, round, and reactive to light.   Cardiovascular:      Rate and Rhythm: Normal rate and regular rhythm.      Comments: R biphasic DP and PT   L monophasic DP and biphasic PT  Pulmonary:      Effort: Pulmonary effort is normal.      Breath sounds: Wheezing present.   Abdominal:      General: Abdomen is flat.      Palpations: Abdomen is soft.   Musculoskeletal:         General: Normal range of motion.      Cervical back: Normal range of motion.   Skin:     General: Skin is warm and dry.      Capillary Refill: Capillary refill takes less than 2 seconds.      Comments: R thigh incision with dressing - c/d/i  L groin dressing- c/d/i  R foot s/p TMA w/ace wrap dressing in place   Neurological:      General: No focal deficit present.      Mental Status: She is alert.   Psychiatric:         Mood and Affect: Mood normal.     Significant Labs:  BMP:   Recent Labs   Lab 03/12/23  0420   GLU 93      K 3.8   CL 98   CO2 31*   BUN 57*   CREATININE 1.2   CALCIUM 9.1     CBC:   Recent Labs   Lab 03/12/23  0420 03/12/23  2054   WBC 20.05*  --    RBC 3.10*  --    HGB 8.3*  --    HCT 26.1* 25*   *  --    MCV 84  --    MCH 26.8*  --    MCHC 31.8*  --        Significant Diagnostics:  I have reviewed all pertinent imaging results/findings within the past 24 hours.    Assessment/Plan:     * Critical limb ischemia with history of revascularization of same extremity  Nelsy Marino is a 82 y.o. lady with severe PAD    -s/p TMA 3/4  -Incisions stable, no sign of infection   -UA cultures negative, blood cultures no growth to date, wound cultures NGTD  -please have a low threshold to escalate antibiotics. If her graft becomes infected and requires antibiotics, she has no other options left for  revascularizations  -Q4H neurovasc checks  -Continue diet  -ASA/plavix/Statin/Eliquis   -PT/OT  -Podiatry following  -remainder of care per primary    Dispo: SNF  Follow up in two weeks with Bettina, ARCHIE/TBI, US Graft        GREGORY Coronel MD  Vascular Surgery  [facility]  [facility]

## 2023-03-13 NOTE — ASSESSMENT & PLAN NOTE
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    She underwent left external iliac to R SFA bypass with PTFE, left common + ext iliac artery stenting, R SFA angioplasty with drug coated balloon on 3/8/23. 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     She remains afebrile, with persistent leukocytosis, wound dehiscence at TMA site, cultures now show growth of pseudomonas fluorescens (3/10).  No other localizing symptoms of infection.     Recommendations    - Continue Linezolid monotherapy for SSTI coverage-, start Meropenem IV to cover for pseudomonas fluorescens until susceptibilities result ( pseudomonas was growing while patient was and had received prolonged pip/tazo course)    -Follow up on pathology of bone margins.

## 2023-03-13 NOTE — ASSESSMENT & PLAN NOTE
Plan:  -Continuing to monitor right TMA site. If WBCs continue to rise with no other source or if right foot worsens then may consider opening of incision and exploration of surgical wound. Patient would like to avoid opening the foot if possible, especially given the negative cultures from amputation and from recent I&D/suture removal.   -Antibiotic plan per ID.  -Appreciate vascular surgery recs.  -Dressing changes by nursing.  -RLE WB to heel in surgical shoe for transfers or short distances.   -Elevate RLE at all times while in bed or chair.  -Podiatry will follow.

## 2023-03-13 NOTE — SUBJECTIVE & OBJECTIVE
Medications:  Continuous Infusions:  Scheduled Meds:   amLODIPine  10 mg Oral Daily    aspirin  81 mg Oral Daily    atorvastatin  40 mg Oral QHS    clopidogreL  75 mg Oral Daily    dextromethorphan-guaiFENesin  mg  1 tablet Oral BID    furosemide  40 mg Oral BID    insulin aspart U-100  0-5 Units Subcutaneous QID (AC & HS)    insulin aspart U-100  5 Units Subcutaneous TIDWM    insulin detemir U-100  18 Units Subcutaneous QHS    levalbuterol  1.25 mg Nebulization Q8H WA    linezolid  600 mg Oral Q12H    melatonin  6 mg Oral Nightly    methocarbamoL  500 mg Oral QID     PRN Meds:sodium chloride, sodium chloride, acetaminophen, benzonatate, dextrose 10%, dextrose 10%, dextrose 10%, dextrose 10%, dextrose, dextrose, dextrose, dextrose, glucagon (human recombinant), glucagon (human recombinant), glucagon (human recombinant), levalbuterol, naloxone, ondansetron, prochlorperazine, simethicone, sodium chloride 0.9%, traMADoL     Objective:     Vital Signs (Most Recent):  Temp: 98.9 °F (37.2 °C) (03/13/23 0526)  Pulse: 82 (03/13/23 0526)  Resp: 16 (03/13/23 0611)  BP: (!) 152/72 (03/13/23 0526)  SpO2: (!) 92 % (03/13/23 0526)   Vital Signs (24h Range):  Temp:  [98 °F (36.7 °C)-99.4 °F (37.4 °C)] 98.9 °F (37.2 °C)  Pulse:  [73-83] 82  Resp:  [16-20] 16  SpO2:  [92 %-98 %] 92 %  BP: (127-152)/(58-72) 152/72        Physical Exam  Vitals reviewed.   Constitutional:       Appearance: Normal appearance.   HENT:      Head: Normocephalic.      Mouth/Throat:      Mouth: Mucous membranes are moist.   Eyes:      Pupils: Pupils are equal, round, and reactive to light.   Cardiovascular:      Rate and Rhythm: Normal rate and regular rhythm.      Comments: R biphasic DP and PT   L monophasic DP and biphasic PT  Pulmonary:      Effort: Pulmonary effort is normal.      Breath sounds: Wheezing present.   Abdominal:      General: Abdomen is flat.      Palpations: Abdomen is soft.   Musculoskeletal:         General: Normal range of  motion.      Cervical back: Normal range of motion.   Skin:     General: Skin is warm and dry.      Capillary Refill: Capillary refill takes less than 2 seconds.      Comments: R thigh incision with dressing - c/d/i  L groin dressing- c/d/i  R foot s/p TMA w/ace wrap dressing in place   Neurological:      General: No focal deficit present.      Mental Status: She is alert.   Psychiatric:         Mood and Affect: Mood normal.     Significant Labs:  BMP:   Recent Labs   Lab 03/12/23 0420   GLU 93      K 3.8   CL 98   CO2 31*   BUN 57*   CREATININE 1.2   CALCIUM 9.1     CBC:   Recent Labs   Lab 03/12/23 0420 03/12/23  2054   WBC 20.05*  --    RBC 3.10*  --    HGB 8.3*  --    HCT 26.1* 25*   *  --    MCV 84  --    MCH 26.8*  --    MCHC 31.8*  --        Significant Diagnostics:  I have reviewed all pertinent imaging results/findings within the past 24 hours.

## 2023-03-13 NOTE — CARE UPDATE
RAPID RESPONSE NURSE PROACTIVE ROUNDING NOTE       Time of Visit:     Admit Date: 2023  LOS: 13  Code Status: Full Code   Date of Visit: 2023  : 1940  Age: 82 y.o.  Sex: female  Race: Black or   Bed: 1061062 A:   MRN: [id number]  Was the patient discharged from an ICU this admission? No   Was the patient discharged from a PACU within last 24 hours? No   Did the patient receive conscious sedation/general anesthesia in last 24 hours? No  Was the patient in the ED within the past 24 hours? No  Was the patient on NIPPV within the past 24 hours? No   Attending Physician: Tanner Coello MD  Primary Service: [facility]    Time spent at the bedside: 30-45 min     SITUATION    Notified by charge RN via phone call.  Reason for alert: somnolence  Called to evaluate the patient for Neuro    BACKGROUND     Why is the patient in the hospital?: Critical limb ischemia with history of revascularization of same extremity    Patient has a past medical history of CHF (congestive heart failure), Diabetes mellitus, DVT (deep venous thrombosis), Hypertension, and Miscarriage.    Last Vitals:  Temp: 99.4 °F (37.4 °C) (2027)  Pulse: 80 (2158)  Resp: 18 (2158)  BP: 128/69 (2027)  SpO2: 96 % (2158)    24 Hours Vitals Range:  Temp:  [98 °F (36.7 °C)-99.4 °F (37.4 °C)]   Pulse:  [73-81]   Resp:  [16-20]   BP: (127-147)/(58-69)   SpO2:  [93 %-98 %]     Labs:  Recent Labs     03/10/23  0413 03/11/23  0425 03/12/23  0420 03/12/23  2054   WBC 15.46* 18.51* 20.05*  --    HGB 6.4* 5.9* 8.3*  --    HCT 20.6* 19.7* 26.1* 25*    443 473*  --        Recent Labs     03/10/23  0413 03/11/23  0425 03/12/23  0420    138 140   K 3.9 4.3 3.8   CL 99 98 98   CO2 29 29 31*   CREATININE 1.4 1.5* 1.2   * 175* 93   PHOS 3.4 4.7*  --    MG 2.2 2.3  --         Recent Labs     23   PH 7.438   PCO2 46.1*   PO2 45   HCO3 31.2*   POCSATURATED 82*   BE 7         ASSESSMENT    Physical Exam  Constitutional:       Appearance: She is ill-appearing.   Cardiovascular:      Rate and Rhythm: Normal rate and regular rhythm.   Pulmonary:      Effort: Accessory muscle usage present.      Breath sounds: Normal breath sounds.      Comments: abdominal muscle use with expiration  Skin:     General: Skin is warm and dry.   Neurological:      Mental Status: She is oriented to person, place, and time. She is lethargic.      GCS: GCS eye subscore is 3. GCS verbal subscore is 5. GCS motor subscore is 6.     HR 73  /69 (90)  RR 20, SpO2 96% on 2L NC  Temp 99.4      Patient wakens to voice. Answers questions appropriately, but quickly falls asleep between care. Follows commands, though has to be prompted 2-3 times to complete task. Granddaughter at bedside states this is unusual for her.  Recevied robaxin 500mg at 1739, gabapentin 100mg at 1448, and tramadol 50mg at 1247 (none of these are new medications, however)    INTERVENTIONS    The patient was seen for Neurological problem. Staff concerns included mental status change and decreased responsiveness. The following interventions were performed: POCT glucose, NIH stroke assessment, and POCT venous blood gas.    NIH 1    VBG unremarkable    RECOMMENDATIONS    Hold any medications that may worsen somnolence (notify MD)  Recheck BG at midnight  Continue to monitor VS, neuro status. Notify MD BARI with any acute changes  Aspiration, fall precautions    PROVIDER ESCALATION    Yes/No  no    Orders received and case discussed with NA.    Disposition: Remain in room 1062.    FOLLOW-UP    bedside Chika RICHARDS, charge RN Clary  updated on plan of care. Instructed to call the Rapid Response NurseLISANDRO RN at [phone] for additional questions or concerns.

## 2023-03-13 NOTE — NURSING
Patient lying supine slightly slumped over to the right in hospital bed. Patient was difficult to arouse. Nasal Cannula had fallen from her nares. Vital signs obtained and O2 was replaced. Temp slightly elevated. 99.2. . No intake at dinner meal. Patient was unable to answer orientation questions at first assessment. Noted diminished right upper lobe sounds upon ascultation, Abdominal muscles used while breathing. Rapid Response team notified of change. Meds given by mouth were held related to patients lethargy. Family at bedside.     Patient was able to answer orientation questions when prompted by Rapid Team. Will continue to monitor.

## 2023-03-13 NOTE — PT/OT/SLP PROGRESS
"Occupational Therapy   Treatment    Name: Nelsy Marino  MRN: [id number]  Admitting Diagnosis:  Critical limb ischemia with history of revascularization of same extremity  9 Days Post-Op    Recommendations:     Discharge Recommendations: nursing facility, skilled  Discharge Equipment Recommendations:  wheelchair, lift device, hospital bed  Barriers to discharge:  None    Assessment:     Nelsy Marino is a 82 y.o. female with a medical diagnosis of Critical limb ischemia with history of revascularization of same extremity. Pt with difficulty taking steps to chair this session due to increased weakness. Performance deficits affecting function are weakness, impaired endurance, impaired self care skills, impaired functional mobility, impaired balance, gait instability, decreased coordination, decreased safety awareness.     Rehab Prognosis:  Good; patient would benefit from acute skilled OT services to address these deficits and reach maximum level of function.       Plan:     Patient to be seen 3 x/week to address the above listed problems via self-care/home management, therapeutic activities, therapeutic exercises, neuromuscular re-education  Plan of Care Expires: 04/05/23  Plan of Care Reviewed with: patient, spouse    Subjective     "I stayed in bed all weekend - maybe that's why i'm weak."    Pain/Comfort:  Pain Rating 1: 0/10    Objective:     Communicated with: rn prior to session.  Patient found supine with oxygen upon OT entry to room.    General Precautions: Standard, fall    Orthopedic Precautions:RLE weight bearing as tolerated  Braces:  (darco)  Respiratory Status: Nasal cannula, flow 2 L/min     Occupational Performance:     Bed Mobility:    Patient completed Scooting/Bridging with contact guard assistance  Patient completed Supine to Sit with moderate assistance     Functional Mobility/Transfers:  Patient completed Sit <> Stand Transfer with maximal assistance  with  no assistive device   Patient " completed Bed <> Chair Transfer using Stand Pivot technique with maximal assistance with no assistive device    Activities of Daily Living:  Lower Body Dressing: total assistance to don Darco shoe.    [facility] 6 Click ADL: 18    Treatment & Education:  From chair level, pt completed BUE therex (x 10 reps each) including:  - elbow flexion/extension  - straight arm raises  - hor Abd/Add  - lat rows    **Discussed OT POC with pt / family.    Patient left up in chair with all lines intact and call button in reach    GOALS:   Multidisciplinary Problems       Occupational Therapy Goals          Problem: Occupational Therapy    Goal Priority Disciplines Outcome Interventions   Occupational Therapy Goal     OT, PT/OT Ongoing, Progressing    Description: Goals to be met by: 3/20/23     Patient will increase functional independence with ADLs by performing:    Feeding with [place].  UE Dressing with [place].  LE Dressing with Supervision.  Grooming while standing at sink with Stand-by Assistance.  Toileting from toilet with Stand-by Assistance for hygiene and clothing management.   Toilet transfer to toilet with Stand-by Assistance.                         Time Tracking:     OT Date of Treatment: 03/13/23  OT Start Time: 0848  OT Stop Time: 0913  OT Total Time (min): 25 min    Billable Minutes:Therapeutic Activity 15  Therapeutic Exercise 10    OT/GEORGE: OT          3/13/2023

## 2023-03-14 PROBLEM — Z79.4 TYPE 2 DIABETES MELLITUS WITH CHRONIC KIDNEY DISEASE, WITH LONG-TERM CURRENT USE OF INSULIN: Status: ACTIVE | Noted: 2019-03-20

## 2023-03-14 PROBLEM — E11.22 TYPE 2 DIABETES MELLITUS WITH CHRONIC KIDNEY DISEASE, WITH LONG-TERM CURRENT USE OF INSULIN: Status: ACTIVE | Noted: 2019-03-20

## 2023-03-14 PROBLEM — I73.9 PVD (PERIPHERAL VASCULAR DISEASE): Chronic | Status: RESOLVED | Noted: 2019-03-20 | Resolved: 2023-03-14

## 2023-03-14 LAB
ALBUMIN SERPL BCP-MCNC: 1.9 G/DL (ref 3.5–5.2)
ALP SERPL-CCNC: 82 U/L (ref 55–135)
ALT SERPL W/O P-5'-P-CCNC: 20 U/L (ref 10–44)
ANION GAP SERPL CALC-SCNC: 13 MMOL/L (ref 8–16)
APTT BLDCRRT: 33.3 SEC (ref 21–32)
AST SERPL-CCNC: 37 U/L (ref 10–40)
BACTERIA BLD CULT: NORMAL
BACTERIA BLD CULT: NORMAL
BACTERIA SPEC ANAEROBE CULT: NORMAL
BASOPHILS # BLD AUTO: 0.02 K/UL (ref 0–0.2)
BASOPHILS NFR BLD: 0.1 % (ref 0–1.9)
BILIRUB SERPL-MCNC: 0.7 MG/DL (ref 0.1–1)
BUN SERPL-MCNC: 58 MG/DL (ref 8–23)
CALCIUM SERPL-MCNC: 8.8 MG/DL (ref 8.7–10.5)
CHLORIDE SERPL-SCNC: 100 MMOL/L (ref 95–110)
CO2 SERPL-SCNC: 28 MMOL/L (ref 23–29)
CREAT SERPL-MCNC: 1.4 MG/DL (ref 0.5–1.4)
DIFFERENTIAL METHOD: ABNORMAL
EOSINOPHIL # BLD AUTO: 0.1 K/UL (ref 0–0.5)
EOSINOPHIL NFR BLD: 0.7 % (ref 0–8)
ERYTHROCYTE [DISTWIDTH] IN BLOOD BY AUTOMATED COUNT: 15.9 % (ref 11.5–14.5)
EST. GFR  (NO RACE VARIABLE): 37.6 ML/MIN/1.73 M^2
FINAL PATHOLOGIC DIAGNOSIS: NORMAL
GLUCOSE SERPL-MCNC: 126 MG/DL (ref 70–110)
GROSS: NORMAL
HCT VFR BLD AUTO: 25 % (ref 37–48.5)
HGB BLD-MCNC: 7.9 G/DL (ref 12–16)
IMM GRANULOCYTES # BLD AUTO: 0.16 K/UL (ref 0–0.04)
IMM GRANULOCYTES NFR BLD AUTO: 0.9 % (ref 0–0.5)
LYMPHOCYTES # BLD AUTO: 0.9 K/UL (ref 1–4.8)
LYMPHOCYTES NFR BLD: 5 % (ref 18–48)
Lab: NORMAL
MAGNESIUM SERPL-MCNC: 2.8 MG/DL (ref 1.6–2.6)
MCH RBC QN AUTO: 26.5 PG (ref 27–31)
MCHC RBC AUTO-ENTMCNC: 31.6 G/DL (ref 32–36)
MCV RBC AUTO: 84 FL (ref 82–98)
MONOCYTES # BLD AUTO: 0.9 K/UL (ref 0.3–1)
MONOCYTES NFR BLD: 5.4 % (ref 4–15)
NEUTROPHILS # BLD AUTO: 14.8 K/UL (ref 1.8–7.7)
NEUTROPHILS NFR BLD: 87.9 % (ref 38–73)
NRBC BLD-RTO: 0 /100 WBC
PHOSPHATE SERPL-MCNC: 4.4 MG/DL (ref 2.7–4.5)
PLATELET # BLD AUTO: 510 K/UL (ref 150–450)
PMV BLD AUTO: 10.4 FL (ref 9.2–12.9)
POCT GLUCOSE: 145 MG/DL (ref 70–110)
POCT GLUCOSE: 174 MG/DL (ref 70–110)
POCT GLUCOSE: 187 MG/DL (ref 70–110)
POCT GLUCOSE: 199 MG/DL (ref 70–110)
POTASSIUM SERPL-SCNC: 4.4 MMOL/L (ref 3.5–5.1)
PROT SERPL-MCNC: 7.2 G/DL (ref 6–8.4)
RBC # BLD AUTO: 2.98 M/UL (ref 4–5.4)
SODIUM SERPL-SCNC: 141 MMOL/L (ref 136–145)
WBC # BLD AUTO: 16.9 K/UL (ref 3.9–12.7)

## 2023-03-14 PROCEDURE — 84100 ASSAY OF PHOSPHORUS: CPT | Performed by: INTERNAL MEDICINE

## 2023-03-14 PROCEDURE — 85730 THROMBOPLASTIN TIME PARTIAL: CPT | Performed by: STUDENT IN AN ORGANIZED HEALTH CARE EDUCATION/TRAINING PROGRAM

## 2023-03-14 PROCEDURE — 94761 N-INVAS EAR/PLS OXIMETRY MLT: CPT

## 2023-03-14 PROCEDURE — 27000646 HC AEROBIKA DEVICE

## 2023-03-14 PROCEDURE — 25000003 PHARM REV CODE 250: Performed by: INTERNAL MEDICINE

## 2023-03-14 PROCEDURE — 99232 PR SUBSEQUENT HOSPITAL CARE,LEVL II: ICD-10-PCS | Mod: GC,,, | Performed by: INTERNAL MEDICINE

## 2023-03-14 PROCEDURE — 25000242 PHARM REV CODE 250 ALT 637 W/ HCPCS: Performed by: INTERNAL MEDICINE

## 2023-03-14 PROCEDURE — 97110 THERAPEUTIC EXERCISES: CPT

## 2023-03-14 PROCEDURE — 94664 DEMO&/EVAL PT USE INHALER: CPT

## 2023-03-14 PROCEDURE — 27000221 HC OXYGEN, UP TO 24 HOURS

## 2023-03-14 PROCEDURE — 25000003 PHARM REV CODE 250: Performed by: STUDENT IN AN ORGANIZED HEALTH CARE EDUCATION/TRAINING PROGRAM

## 2023-03-14 PROCEDURE — 36415 COLL VENOUS BLD VENIPUNCTURE: CPT | Performed by: STUDENT IN AN ORGANIZED HEALTH CARE EDUCATION/TRAINING PROGRAM

## 2023-03-14 PROCEDURE — 99232 PR SUBSEQUENT HOSPITAL CARE,LEVL II: ICD-10-PCS | Mod: ,,, | Performed by: INTERNAL MEDICINE

## 2023-03-14 PROCEDURE — 85025 COMPLETE CBC W/AUTO DIFF WBC: CPT | Performed by: STUDENT IN AN ORGANIZED HEALTH CARE EDUCATION/TRAINING PROGRAM

## 2023-03-14 PROCEDURE — 94640 AIRWAY INHALATION TREATMENT: CPT

## 2023-03-14 PROCEDURE — 63600175 PHARM REV CODE 636 W HCPCS: Performed by: STUDENT IN AN ORGANIZED HEALTH CARE EDUCATION/TRAINING PROGRAM

## 2023-03-14 PROCEDURE — 80053 COMPREHEN METABOLIC PANEL: CPT | Performed by: STUDENT IN AN ORGANIZED HEALTH CARE EDUCATION/TRAINING PROGRAM

## 2023-03-14 PROCEDURE — 97530 THERAPEUTIC ACTIVITIES: CPT

## 2023-03-14 PROCEDURE — 83735 ASSAY OF MAGNESIUM: CPT | Performed by: INTERNAL MEDICINE

## 2023-03-14 PROCEDURE — 99232 SBSQ HOSP IP/OBS MODERATE 35: CPT | Mod: GC,,, | Performed by: INTERNAL MEDICINE

## 2023-03-14 PROCEDURE — 94668 MNPJ CHEST WALL SBSQ: CPT

## 2023-03-14 PROCEDURE — 20600001 HC STEP DOWN PRIVATE ROOM

## 2023-03-14 PROCEDURE — 99232 SBSQ HOSP IP/OBS MODERATE 35: CPT | Mod: ,,, | Performed by: INTERNAL MEDICINE

## 2023-03-14 PROCEDURE — 99900035 HC TECH TIME PER 15 MIN (STAT)

## 2023-03-14 RX ORDER — DULOXETIN HYDROCHLORIDE 30 MG/1
30 CAPSULE, DELAYED RELEASE ORAL 2 TIMES DAILY
Status: DISCONTINUED | OUTPATIENT
Start: 2023-03-14 | End: 2023-03-20 | Stop reason: HOSPADM

## 2023-03-14 RX ADMIN — LINEZOLID 600 MG: 600 TABLET, FILM COATED ORAL at 09:03

## 2023-03-14 RX ADMIN — INSULIN ASPART 4 UNITS: 100 INJECTION, SOLUTION INTRAVENOUS; SUBCUTANEOUS at 04:03

## 2023-03-14 RX ADMIN — GUAIFENESIN AND DEXTROMETHORPHAN HYDROBROMIDE 1 TABLET: 600; 30 TABLET, EXTENDED RELEASE ORAL at 09:03

## 2023-03-14 RX ADMIN — LEVALBUTEROL 1.25 MG: 1.25 SOLUTION, CONCENTRATE RESPIRATORY (INHALATION) at 07:03

## 2023-03-14 RX ADMIN — FUROSEMIDE 40 MG: 40 TABLET ORAL at 05:03

## 2023-03-14 RX ADMIN — ASPIRIN 81 MG: 81 TABLET, COATED ORAL at 08:03

## 2023-03-14 RX ADMIN — INSULIN DETEMIR 12 UNITS: 100 INJECTION, SOLUTION SUBCUTANEOUS at 08:03

## 2023-03-14 RX ADMIN — METHOCARBAMOL 500 MG: 500 TABLET ORAL at 09:03

## 2023-03-14 RX ADMIN — MEROPENEM 1 G: 1 INJECTION INTRAVENOUS at 08:03

## 2023-03-14 RX ADMIN — APIXABAN 5 MG: 5 TABLET, FILM COATED ORAL at 09:03

## 2023-03-14 RX ADMIN — GUAIFENESIN AND DEXTROMETHORPHAN HYDROBROMIDE 1 TABLET: 600; 30 TABLET, EXTENDED RELEASE ORAL at 08:03

## 2023-03-14 RX ADMIN — MEROPENEM 2 G: 1 INJECTION INTRAVENOUS at 09:03

## 2023-03-14 RX ADMIN — METHOCARBAMOL 500 MG: 500 TABLET ORAL at 12:03

## 2023-03-14 RX ADMIN — FUROSEMIDE 40 MG: 40 TABLET ORAL at 08:03

## 2023-03-14 RX ADMIN — TRAMADOL HYDROCHLORIDE 50 MG: 50 TABLET, COATED ORAL at 07:03

## 2023-03-14 RX ADMIN — AMLODIPINE BESYLATE 10 MG: 10 TABLET ORAL at 08:03

## 2023-03-14 RX ADMIN — CLOPIDOGREL BISULFATE 75 MG: 75 TABLET ORAL at 08:03

## 2023-03-14 RX ADMIN — METHOCARBAMOL 500 MG: 500 TABLET ORAL at 08:03

## 2023-03-14 RX ADMIN — LINEZOLID 600 MG: 600 TABLET, FILM COATED ORAL at 08:03

## 2023-03-14 RX ADMIN — INSULIN ASPART 4 UNITS: 100 INJECTION, SOLUTION INTRAVENOUS; SUBCUTANEOUS at 08:03

## 2023-03-14 RX ADMIN — INSULIN ASPART 4 UNITS: 100 INJECTION, SOLUTION INTRAVENOUS; SUBCUTANEOUS at 12:03

## 2023-03-14 RX ADMIN — LEVALBUTEROL 1.25 MG: 1.25 SOLUTION, CONCENTRATE RESPIRATORY (INHALATION) at 03:03

## 2023-03-14 RX ADMIN — METHOCARBAMOL 500 MG: 500 TABLET ORAL at 04:03

## 2023-03-14 RX ADMIN — ATORVASTATIN CALCIUM 40 MG: 40 TABLET, FILM COATED ORAL at 09:03

## 2023-03-14 NOTE — ASSESSMENT & PLAN NOTE
Nelsy Marino is a 82 y.o. lady with severe PAD    -s/p TMA 3/4  -pseudomonas growing from TMA  -Merrem scheduled  -WBC is improving  -Q4H neurovasc checks  -Continue diet  -ASA/plavix/Statin/Eliquis   -PT/OT  -Podiatry following  -remainder of care per primary

## 2023-03-14 NOTE — PROGRESS NOTES
[facility] - [facility]  Vascular Surgery  Progress Note    Patient Name: Nelsy Marino  MRN: [id number]  Admission Date: 2/27/2023  Primary Care Provider: Juan F Garay    Subjective:     Interval History: RENETTA MOBLEY.  Patient is well-appearing this morning, states she slept well last night.  Dressings changed to groin, no purulence noted. WBC decreasing with antibiotics.     Post-Op Info:  Procedure(s) (LRB):  AMPUTATION, FOOT, TRANSMETATARSAL (Right)   10 Days Post-Op       Medications:  Continuous Infusions:  Scheduled Meds:   amLODIPine  10 mg Oral Daily    aspirin  81 mg Oral Daily    atorvastatin  40 mg Oral QHS    clopidogreL  75 mg Oral Daily    dextromethorphan-guaiFENesin  mg  1 tablet Oral BID    furosemide  40 mg Oral BID    insulin aspart U-100  0-5 Units Subcutaneous QID (AC & HS)    insulin aspart U-100  4 Units Subcutaneous TIDWM    insulin detemir U-100  12 Units Subcutaneous Daily    levalbuterol  1.25 mg Nebulization Q8H WA    linezolid  600 mg Oral Q12H    melatonin  6 mg Oral Nightly    meropenem (MERREM) IVPB  1 g Intravenous Q12H    methocarbamoL  500 mg Oral QID     PRN Meds:sodium chloride, sodium chloride, acetaminophen, benzonatate, dextrose 10%, dextrose 10%, dextrose 10%, dextrose 10%, dextrose, dextrose, dextrose, dextrose, glucagon (human recombinant), glucagon (human recombinant), glucagon (human recombinant), levalbuterol, naloxone, ondansetron, prochlorperazine, simethicone, sodium chloride 0.9%, traMADoL     Objective:     Vital Signs (Most Recent):  Temp: 98.9 °F (37.2 °C) (03/14/23 0508)  Pulse: 75 (03/14/23 0508)  Resp: 16 (03/14/23 0508)  BP: (!) 149/68 (03/14/23 0508)  SpO2: 96 % (03/14/23 0508)   Vital Signs (24h Range):  Temp:  [97.3 °F (36.3 °C)-99.1 °F (37.3 °C)] 98.9 °F (37.2 °C)  Pulse:  [75-82] 75  Resp:  [16-21] 16  SpO2:  [94 %-97 %] 96 %  BP: (123-149)/(58-68) 149/68         Physical Exam  Vitals reviewed.   Constitutional:       Appearance: Normal  appearance.   HENT:      Mouth/Throat:      Mouth: Mucous membranes are moist.   Eyes:      Pupils: Pupils are equal, round, and reactive to light.   Cardiovascular:      Rate and Rhythm: Normal rate and regular rhythm.      Comments: R biphasic DP and PT   L monophasic DP and biphasic PT  Pulmonary:      Effort: Pulmonary effort is normal.   Abdominal:      General: Abdomen is flat.      Palpations: Abdomen is soft.   Skin:     General: Skin is warm and dry.      Capillary Refill: Capillary refill takes less than 2 seconds.      Comments: R thigh incision well-appearing  L groin dressing- small portion of breakdown, no drainage   R foot TMA dressed per podiatry    Neurological:      General: No focal deficit present.      Mental Status: She is alert.   Psychiatric:         Mood and Affect: Mood normal.       Significant Labs:  CBC:   Recent Labs   Lab 03/14/23 0217   WBC 16.90*   RBC 2.98*   HGB 7.9*   HCT 25.0*   *   MCV 84   MCH 26.5*   MCHC 31.6*     CMP:   Recent Labs   Lab 03/14/23 0217   *   CALCIUM 8.8   ALBUMIN 1.9*   PROT 7.2      K 4.4   CO2 28      BUN 58*   CREATININE 1.4   ALKPHOS 82   ALT 20   AST 37   BILITOT 0.7       Significant Diagnostics:  I have reviewed all pertinent imaging results/findings within the past 24 hours.    Assessment/Plan:     * Critical limb ischemia with history of revascularization of same extremity  Nelsy Marino is a 82 y.o. lady with severe PAD    -s/p TMA 3/4  -pseudomonas growing from TMA  -Merrem scheduled  -WBC is improving  -Q4H neurovasc checks  -Continue diet  -ASA/plavix/Statin/Eliquis   -PT/OT  -Podiatry following  -remainder of care per primary            Samantha Ledesma NP  Vascular Surgery  [facility]

## 2023-03-14 NOTE — PROGRESS NOTES
[facility] - [facility]  Endocrinology  Progress Note    Admit Date: 2/27/2023     Reason for Consult: Management of T2DM, Hyperglycemia     Surgical Procedure and Date: s/p  Left external iliac to right SFA bypass with 8mm PTFE; Left common and external iliac artery angioplasty and stenting, 8mm Lifestent; Aortogram, pelvic angiogram; Right lower extremity angiogram; and Right SFA angioplasty with plain and drug coated 4mm balloon on 3/8/2023    Diabetes diagnosis year: approx 26 years ago    Home Diabetes Medications:  Glipizide 10 mg daily, Novolog-NPH 70/30 50 units BID (prior Metformin but no longer) - uncertain why both sulfonylurea and insulin    How often checking glucose at home? 1-3 x day   BG readings on regimen: 100-200  Hypoglycemia on the regimen?  Yes at times  Missed doses on regimen?  No    Diabetes Complications include:     Hyperglycemia and Diabetic dermatitis    Complicating diabetes co morbidities:   HTN; DVT; CHF    HPI: This is an 82-year-old female with a past medical history of severe PAD, hypertension, and insulin dependant type 2 diabetes, history of DVT who presents with right foot pain. Patient reports worsening right foot pain, swelling and discoloration that started after she had an ingrown toenail removed by her podiatrist.  She reports that her discoloration started since 2/7. Of note, the patient underwent an angiogram on 02/23 by vascular surgery. In the ED, she was tachycardic (112), and hypertensive.  Labs showed microcytic anemia (10.2), elevated sed rate (112), elevated CRP (56.5), elevated lactic acid (2.5).  Foot x-ray showed DJD and spurs on the calcaneus with no acute process. Vascular surgery [place]  [place] [facility]. Now s/p  Left external iliac to right SFA bypass with 8mm PTFE; Left common and external iliac artery angioplasty and stenting, 8mm Lifestent; Aortogram, pelvic angiogram; Right lower extremity angiogram; and Right SFA angioplasty  "with plain and drug coated 4mm balloon on 3/8/2023. Endocrine consulted to manage hyperglycemia and type 2 diabetes. Primary requesting BG not exceed 180 mg/dL.      Lab Results   Component Value Date    HGBA1C 8.2 (H) 02/28/2023     On evaluation the patient confirms that she has been a diabetic for 26 years.  She is on both Glipizide and 70/30 insulin.  Managed by her PCP.  She denies any issues with hypoglycemia or symptoms concerning for this.  Family history of diabetes includes multiple sisters and brothers.        Interval HPI:   Blood sugar reasonably controlled in the last 24 hours.     /63 (BP Location: Left arm, Patient Position: Lying)   Pulse 84   Temp 98.8 °F (37.1 °C) (Oral)   Resp 18   Ht 5' 4" (1.626 m)   Wt 73.5 kg (162 lb 0.6 oz)   LMP  (LMP Unknown)   SpO2 98%   BMI 27.81 kg/m²     Labs Reviewed and Include    Recent Labs   Lab 03/14/23  0217   *   CALCIUM 8.8   ALBUMIN 1.9*   PROT 7.2      K 4.4   CO2 28      BUN 58*   CREATININE 1.4   ALKPHOS 82   ALT 20   AST 37   BILITOT 0.7     Lab Results   Component Value Date    WBC 16.90 (H) 03/14/2023    HGB 7.9 (L) 03/14/2023    HCT 25.0 (L) 03/14/2023    MCV 84 03/14/2023     (H) 03/14/2023     No results for input(s): TSH, FREET4 in the last 168 hours.  Lab Results   Component Value Date    HGBA1C 8.2 (H) 02/28/2023       Nutritional status:   Body mass index is 27.81 kg/m².  Lab Results   Component Value Date    ALBUMIN 1.9 (L) 03/14/2023    ALBUMIN 2.0 (L) 03/13/2023    ALBUMIN 2.1 (L) 03/12/2023     No results found for: PREALBUMIN    Estimated Creatinine Clearance: 30.4 mL/min (based on SCr of 1.4 mg/dL).    Accu-Checks  Recent Labs     03/12/23  1017 03/12/23  1035 03/12/23  1441 03/12/23  1659 03/12/23 2003 03/13/23  0856 03/13/23  1118 03/13/23  1607 03/13/23  1931 03/14/23  0834   POCTGLUCOSE 216* 175* 141* 124* 100 226* 187* 182* 136* 174*       Current Medications and/or Treatments Impacting Glycemic " Control  Immunotherapy:    Immunosuppressants       None          Steroids:   Hormones (From admission, onward)      Start     Stop Route Frequency Ordered    03/09/23 2100  melatonin tablet 6 mg         -- Oral Nightly 03/09/23 1546          Pressors:    Autonomic Drugs (From admission, onward)      None          Hyperglycemia/Diabetes Medications:   Antihyperglycemics (From admission, onward)      Start     Stop Route Frequency Ordered    03/13/23 1200  insulin detemir U-100 pen 12 Units         -- SubQ Daily 03/13/23 1057    03/13/23 1130  insulin aspart U-100 pen 4 Units         -- SubQ 3 times daily with meals 03/13/23 1058    03/12/23 0645  insulin aspart U-100 pen 0-5 Units         -- SubQ Before meals & nightly 03/11/23 9002            ASSESSMENT and PLAN      Type 2 diabetes mellitus with chronic kidney disease, with long-term current use of insulin  - Blood glucose goal 140-180  - Blood sugars mostly at goal in the last 24 hours    Plan:  - Levemir 12 units daily  - Aspart 4 units before meals  - Low dose correction scale insulin as needed  - BG checks AC/HS and at 2 am  - Hypoglycemia protocol in place  - If blood glucose greater than 300, please ask patient not to eat food or drink anything other than water until correctional insulin has brought it back below 250  - Please notify Endocrine for any change and/or advance in diet      Critical limb ischemia with history of revascularization of same extremity  - Optimize blood glucose control to improve wound healing      History of Hypoglycemia  - no episodes in the last 24 hours  - caution with insulin titration considering age, kidney function and variable appetite       Aditya Farrell MD  Endocrinology  [facility]

## 2023-03-14 NOTE — PT/OT/SLP PROGRESS
Physical Therapy Treatment    Patient Name:  Nelsy Marino   MRN:  [id number]    Recommendations:     Discharge Recommendations: nursing facility, skilled  Discharge Equipment Recommendations: wheelchair, lift device, hospital bed (TBD by next level of care)  Barriers to discharge: Inaccessible home and Decreased caregiver support    Assessment:     Nelsy Marino is a 82 y.o. female admitted with a medical diagnosis of Critical limb ischemia with history of revascularization of same extremity.  She presents with the following impairments/functional limitations: weakness, impaired endurance, impaired self care skills, impaired functional mobility, gait instability, impaired balance, decreased lower extremity function, decreased safety awareness, pain, orthopedic precautions, decreased ROM. Pt would benefit from a skilled nursing facility for: Dynamic/static standing/sitting balance through skilled balance training, strengthening with the use of skilled therapeutic exercises interventions, and mobility through adaptive equipment training. Pt continues to benefit from a collaborative PT/OT program to improve quality of life and focus on recovery of impairments.      Rehab Prognosis: Good; patient would benefit from acute skilled PT services to address these deficits and reach maximum level of function.    Recent Surgery: Procedure(s) (LRB):  AMPUTATION, FOOT, TRANSMETATARSAL (Right) 10 Days Post-Op    Plan:     During this hospitalization, patient to be seen 4 x/week to address the identified rehab impairments via gait training, therapeutic activities, therapeutic exercises, neuromuscular re-education and progress toward the following goals:    Plan of Care Expires:  04/04/23    Subjective     Chief Complaint: not feeling well in general  Patient/Family Comments/goals: pt reporting she is ready to go home  Pt also reported intermittent cramping in L calf x4 weeks.   Pain/Comfort:  Pain Rating 1:  (not rated)  Location  - Side 1: Right  Location 1: foot  Pain Addressed 1: Reposition, Distraction  Pain Rating Post-Intervention 1: 0/10      Objective:     Communicated with RN prior to session.  Patient found HOB elevated with oxygen, PureWick, peripheral IV upon PT entry to room.     General Precautions: Standard, fall  Orthopedic Precautions: RLE weight bearing as tolerated  Braces:  (DARCO shoe in room)  Respiratory Status: Nasal cannula     Functional Mobility:  Bed Mobility:     Rolling Left:  stand by assistance  Scooting: stand by assistance  Supine to Sit: minimum assistance  Sit to Supine: Nilda  Transfers:     Sit to Stand: Deferred by patient 2/2 fatigue  Balance:   Static Sitting: Supervision  Dynamic Sitting: SBA-CGA      AM-PAC 6 CLICK MOBILITY  Turning over in bed (including adjusting bedclothes, sheets and blankets)?: 3  Sitting down on and standing up from a chair with arms (e.g., wheelchair, bedside commode, etc.): 2  Moving from lying on back to sitting on the side of the bed?: 3  Moving to and from a bed to a chair (including a wheelchair)?: 2  Need to walk in hospital room?: 2  Climbing 3-5 steps with a railing?: 1  Basic Mobility Total Score: 13       Treatment & Education:  Patient also educated and instructed on therapeutic exercises. Therapeutic exercises included the following: Long Arc Quads, Seated marches (Single leg), pt unable to perform DF/PF on B ankles. R ankle limited by bandaging and no LLE DF/PF noted. Pt performed 1x15 on BLEs with verbal/tactile cuing or assistance as needed.  Patient educated on role of therapy, goals of session, and benefits of mobilizing.   Discussed PT plan of care during hospitalization.   Patient educated on calling for assistance.   Patient educated on how their diagnosis impacts their mobility within PT scope of practice.   All questions answered within PT scope of practice.    Patient left HOB elevated with all lines intact, call button in reach, RN notified, and pt's  sister in law present..    GOALS:   Multidisciplinary Problems       Physical Therapy Goals          Problem: Physical Therapy    Goal Priority Disciplines Outcome Goal Variances Interventions   Physical Therapy Goal     PT, PT/OT Ongoing, Progressing     Description: Goals to be met by: 3/20     Patient will increase functional independence with mobility by performin. Supine to sit with Stand-by Assistance  2. Rolling to Left and Right with Stand-by Assistance.  3. Sit to stand transfer with Contact Guard Assistance using Rolling Walker  4. Gait  x 50 feet with Contact Guard Assistance using Rolling Walker.   5. Ascend/descend 1 stair with bilateral Handrails Contact Guard Assistance using Rolling Walker.                          Time Tracking:     PT Received On: 23  PT Start Time: 1509     PT Stop Time: 1534  PT Total Time (min): 25 min     Billable Minutes: Therapeutic Activity 10 and Therapeutic Exercise 15    Treatment Type: Treatment  PT/PTA: PT     Number of PTA visits since last PT visit: 0     2023

## 2023-03-14 NOTE — PLAN OF CARE
S/P transmetatarsal amputation of foot, right  Plan:  -Right TMA site culture now growing Pseudomonas, but with WBCs downtrending on current antibiotic regimen. If WBCs plateau or foot worsens then will open and irrigate right foot TMA site.   -Antibiotic plan per ID.  -Appreciate vascular surgery recs.  -Dressing changes by nursing.  -RLE WB to heel in surgical shoe for transfers or short distances.   -Elevate RLE at all times while in bed or chair.  -Podiatry will follow.       Lalo Pollock DPM PGY-3  Podiatric Medicine & Surgery  [facility]  Secure Chat Preferred  Mobile: [phone]  Pager: [phone]

## 2023-03-14 NOTE — ASSESSMENT & PLAN NOTE
- on 3/12, patient more somnolent and had tremors  - discontinued gabapentin and depakote  - Scr back to baseline so low suspicion for uremia   - continue antibiotic for possible infection   - RESOLVED

## 2023-03-14 NOTE — PROGRESS NOTES
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    She underwent left external iliac to R SFA bypass with PTFE, left common + ext iliac artery stenting, R SFA angioplasty with drug coated balloon on 3/8/23. 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     She remains afebrile, with persistent leukocytosis, wound dehiscence at TMA site, cultures now show growth of pseudomonas fluorescens (3/10).  No other localizing symptoms of infection.     Recommendations    - Continue Linezolid monotherapy for SSTI coverage-, start Meropenem IV to cover for pseudomonas fluorescens until susceptibilities result ( pseudomonas was growing while patient was and had received prolonged pip/tazo course)    -Follow up on pathology of bone margins.             Anticipated Disposition: TBD    Thank you for your consult. I will follow-up with patient. Please contact us if you have any additional questions.    Krunal Miranda MD  Infectious Disease  [facility] -  [facility]    Subjective:     Principal Problem:Critical limb ischemia with history of revascularization of same extremity    HPI: 82 year old female with a history of HTN, DMII, DVT, CHF, PAD, DVT and a history of prior fem-fem 20 years prior that occluded s/p R iliofemoral bypass.  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  She is admitted for worsening right foot pain, swelling and discoloration following removal of an ingrown toe nail.  Patient was diagnosed with dry gangrene of the foot.  She underwent Left external iliac to right SFA bypass with 8mm PTFE on March 2.  She then underwent transmetatarsal amputation of the right foot on March 4.  Cultures from proximal bone margins were negative but the pathology is pending.  ID is consulted to assist with her care.         Interval History: No new adverse events, denies fever, productive cough, diarrhea, dysuria, or significant worsening pain over incision sites, still with some RLE pain post op.     Review of Systems  Constitutional:  Positive for chills and fatigue. Negative for activity change, appetite change and fever.   Respiratory:  Negative for cough and shortness of breath.    Gastrointestinal:  Negative for abdominal distention, blood in stool and vomiting.   Genitourinary:  Negative for dysuria, hematuria and urgency.   Skin:  Positive for rash and wound.   Objective:     Vital Signs (Most Recent):  Temp: 99 °F (37.2 °C) (03/13/23 1228)  Pulse: 77 (03/13/23 1554)  Resp: (!) 21 (03/13/23 1554)  BP: (!) 129/59 (03/13/23 1228)  SpO2: 97 % (03/13/23 1624)   Vital Signs (24h Range):  Temp:  [97.3 °F (36.3 °C)-99.4 °F (37.4 °C)] 99 °F (37.2 °C)  Pulse:  [73-83] 77  Resp:  [16-21] 21  SpO2:  [92 %-97 %] 97 %  BP: (123-152)/(58-72) 129/59     Weight: 73.5 kg (162 lb 0.6 oz)  Body mass index is 27.81 kg/m².    Estimated Creatinine  Clearance: 28.4 mL/min (A) (based on SCr of 1.5 mg/dL (H)).    Physical Exam  Constitutional:       Appearance: Normal appearance. She is ill-appearing. She is not toxic-appearing.   HENT:      Head: Normocephalic and atraumatic.      Nose: Nose normal.      Mouth/Throat:      Mouth: Mucous membranes are moist.      Pharynx: Oropharynx is clear.   Eyes:      Extraocular Movements: Extraocular movements intact.      Conjunctiva/sclera: Conjunctivae normal.      Pupils: Pupils are equal, round, and reactive to light.   Cardiovascular:      Rate and Rhythm: Normal rate and regular rhythm.      Pulses: Normal pulses.      Heart sounds: Normal heart sounds.   Pulmonary:      Effort: Pulmonary effort is normal.      Breath sounds: Normal breath sounds.   Abdominal:      General: Abdomen is flat. Bowel sounds are normal.      Palpations: Abdomen is soft.   Musculoskeletal:         General: Tenderness and deformity present. No swelling.      Cervical back: Normal range of motion and neck supple.   Skin:     General: Skin is warm and dry.      Findings: Bruising and lesion present.      Comments: Right TMA with dressing c/d/I and surgical dressing in place, photos reviewed.   L and R groin incision sites appear clean, dry, intact     Neurological:      Mental Status: She is alert. Mental status is at baseline.   Psychiatric:         Behavior: Behavior normal.         Thought Content: Thought content normal.   Significant Labs: BMP:   Recent Labs   Lab 03/13/23  0809   *      K 4.4   CL 98   CO2 27   BUN 63*   CREATININE 1.5*   CALCIUM 8.9   MG 2.6     CBC:   Recent Labs   Lab 03/12/23  0420 03/12/23  2054 03/13/23  0809   WBC 20.05*  --  19.34*   HGB 8.3*  --  7.9*   HCT 26.1* 25* 26.2*   *  --  472*     CMP:   Recent Labs   Lab 03/12/23  0420 03/13/23  0809    137   K 3.8 4.4   CL 98 98   CO2 31* 27   GLU 93 162*   BUN 57* 63*   CREATININE 1.2 1.5*   CALCIUM 9.1 8.9   PROT 7.2 7.4   ALBUMIN 2.1*  2.0*   BILITOT 0.7 0.6   ALKPHOS 68 67   AST 31 39   ALT 17 20   ANIONGAP 11 12     Microbiology Results (last 7 days)       Procedure Component Value Units Date/Time    Aerobic culture [[id number]]  (Abnormal) Collected: 03/10/23 1340    Order Status: Completed Specimen: Wound from Foot, Right Updated: 03/13/23 1556     Aerobic Bacterial Culture [facility]  Few  Susceptibility pending      Narrative:      Right foot tma site wound    Blood culture [[id number]] Collected: 03/10/23 0942    Order Status: Completed Specimen: Blood from Antecubital, Right Arm Updated: 03/13/23 1212     Blood Culture, Routine No Growth to date      No Growth to date      No Growth to date      No Growth to date    Blood culture [[id number]] Collected: 03/10/23 0943    Order Status: Completed Specimen: Blood from Peripheral, Right Hand Updated: 03/13/23 1212     Blood Culture, Routine No Growth to date      No Growth to date      No Growth to date      No Growth to date    Culture, Anaerobe [[id number]] Collected: 03/04/23 1141    Order Status: Completed Specimen: Incision site from Foot, Right Updated: 03/13/23 1009     Anaerobic Culture No anaerobes isolated    Narrative:      3rd Metatarsal    Blood culture [[id number]] Collected: 03/08/23 2325    Order Status: Completed Specimen: Blood from Peripheral, Lower Arm, Right Updated: 03/13/23 0612     Blood Culture, Routine No Growth to date      No Growth to date      No Growth to date      No Growth to date      No Growth to date    Narrative:      Right Peripheral    Blood culture [[id number]] Collected: 03/08/23 2325    Order Status: Completed Specimen: Blood from Peripheral, Lower Arm, Left Updated: 03/13/23 0612     Blood Culture, Routine No Growth to date      No Growth to date      No Growth to date      No Growth to date      No Growth to date    Narrative:      Left Peripheral    Culture, Anaerobe [[id number]] Collected: 03/10/23 1340    Order Status: Completed  Specimen: Wound from Foot, Right Updated: 03/12/23 1336     Anaerobic Culture Culture in progress    Narrative:      Right foot tma site wound    Respiratory Infection Panel (PCR), Nasopharyngeal [[id number]]     Order Status: No result Specimen: Nasopharyngeal Swab     Culture, Respiratory with Gram Stain [[id number]]     Order Status: No result Specimen: Respiratory     Gram stain [[id number]] Collected: 03/10/23 1340    Order Status: Completed Specimen: Wound from Foot, Right Updated: 03/10/23 1924     Gram Stain Result Rare WBC's      No organisms seen    Narrative:      Right foot tma site wound    Clostridium difficile EIA [[id number]]     Order Status: Canceled Specimen: Stool     Fungus culture [[id number]] Collected: 03/04/23 1141    Order Status: Completed Specimen: Incision site from Foot, Right Updated: 03/09/23 1018     Fungus (Mycology) Culture Culture in progress    Narrative:      3rd Metatarsal    Aerobic culture [[id number]] Collected: 03/04/23 1141    Order Status: Completed Specimen: Incision site from Foot, Right Updated: 03/07/23 0844     Aerobic Bacterial Culture No growth    Narrative:      3rd Metatarsal          Pathology Results  (Last 10 years)      None          All pertinent labs within the past 24 hours have been reviewed.  Recent Lab Results  (Last 5 results in the past 24 hours)        03/13/23  1607   03/13/23  1118   03/13/23  0856   03/13/23  0809   03/12/23  2054        Albumin       2.0         Alkaline Phosphatase       67         Allens Test         N/A       ALT       20         Anion Gap       12         aPTT       32.8  Comment: aPTT therapeutic range = 39-69 seconds         AST       39         Baso #       0.02         Basophil %       0.1         BILIRUBIN TOTAL       0.6  Comment: For infants and newborns, interpretation of results should be based  on gestational age, weight and in agreement with clinical  observations.    Premature Infant recommended reference ranges:  Up to  24 hours.............<8.0 mg/dL  Up to 48 hours............<12.0 mg/dL  3-5 days..................<15.0 mg/dL  6-29 days.................<15.0 mg/dL           BNP       1,381  Comment: Values of less than 100 pg/ml are consistent with non-CHF populations.         Site         Other       BUN       63         Calcium       8.9         Chloride       98         CO2       27         Creatinine       1.5         DelSys         Nasal Can       Differential Method       Automated         eGFR       34.6         Eos #       0.1         Eosinophil %       0.6         Glucose       162         Gran # (ANC)       17.2         Gran %       88.9         Hematocrit       26.2         Hemoglobin       7.9         Immature Grans (Abs)       0.19  Comment: Mild elevation in immature granulocytes is non specific and   can be seen in a variety of conditions including stress response,   acute inflammation, trauma and pregnancy. Correlation with other   laboratory and clinical findings is essential.           Immature Granulocytes       1.0         Lymph #       0.9         Lymph %       4.4         Magnesium       2.6         MCH       25.8         MCHC       30.2         MCV       86         Mono #       1.0         Mono %       5.0         MPV       10.3         nRBC       0         Phosphorus       4.6         Platelets       472         POC BE         7       POC HCO3         31.2       POC Hematocrit         25       POC Ionized Calcium         1.11       POC PCO2         46.1       POC PH         7.438       POC PO2         45       POC Potassium         4.0       POC SATURATED O2         82       POC Sodium         139       POC TCO2         33       POCT Glucose 182   187   226           Potassium       4.4         PROTEIN TOTAL       7.4         RBC       3.06         RDW       15.7         Sample         VENOUS       Sodium       137         WBC       19.34                                Significant Imaging: I have reviewed  all pertinent imaging results/findings within the past 24 hours.

## 2023-03-14 NOTE — PLAN OF CARE
[facility] parrish - [facility]  Discharge Reassessment    Primary Care Provider: Juan F Garay    Expected Discharge Date: 3/15/2023    Reassessment (most recent)       Discharge Reassessment - 03/14/23 1233          Discharge Reassessment    Assessment Type Discharge Planning Reassessment     Did the patient's condition or plan change since previous assessment? No     Discharge Plan discussed with: Patient     Communicated JUAN ALBERTO with patient/caregiver Yes     Discharge Plan A Long-term acute care facility (LTAC)     DME Needed Upon Discharge  none     Discharge Barriers Identified None     Why the patient remains in the hospital Requires continued medical care        Post-Acute Status    Post-Acute Authorization Placement     Post-Acute Placement Status Referrals Sent     Coverage [facility] Resources/Appts/Education Provided Provided patient/caregiver with written discharge plan information     Patient choice form signed by patient/caregiver List with quality metrics by geographic area provided     Discharge Delays None known at this time                     Pt not ready for discharge due to: Not medically ready  SW will remain available for families in [facility].  Currently pt has d/c plans in progress at this time.    BARBIE following for d/c.     Edwige Gan LCSW  Case Management/[facility]  [phone]

## 2023-03-14 NOTE — SUBJECTIVE & OBJECTIVE
Medications:  Continuous Infusions:  Scheduled Meds:   amLODIPine  10 mg Oral Daily    aspirin  81 mg Oral Daily    atorvastatin  40 mg Oral QHS    clopidogreL  75 mg Oral Daily    dextromethorphan-guaiFENesin  mg  1 tablet Oral BID    furosemide  40 mg Oral BID    insulin aspart U-100  0-5 Units Subcutaneous QID (AC & HS)    insulin aspart U-100  4 Units Subcutaneous TIDWM    insulin detemir U-100  12 Units Subcutaneous Daily    levalbuterol  1.25 mg Nebulization Q8H WA    linezolid  600 mg Oral Q12H    melatonin  6 mg Oral Nightly    meropenem (MERREM) IVPB  1 g Intravenous Q12H    methocarbamoL  500 mg Oral QID     PRN Meds:sodium chloride, sodium chloride, acetaminophen, benzonatate, dextrose 10%, dextrose 10%, dextrose 10%, dextrose 10%, dextrose, dextrose, dextrose, dextrose, glucagon (human recombinant), glucagon (human recombinant), glucagon (human recombinant), levalbuterol, naloxone, ondansetron, prochlorperazine, simethicone, sodium chloride 0.9%, traMADoL     Objective:     Vital Signs (Most Recent):  Temp: 98.9 °F (37.2 °C) (03/14/23 0508)  Pulse: 75 (03/14/23 0508)  Resp: 16 (03/14/23 0508)  BP: (!) 149/68 (03/14/23 0508)  SpO2: 96 % (03/14/23 0508)   Vital Signs (24h Range):  Temp:  [97.3 °F (36.3 °C)-99.1 °F (37.3 °C)] 98.9 °F (37.2 °C)  Pulse:  [75-82] 75  Resp:  [16-21] 16  SpO2:  [94 %-97 %] 96 %  BP: (123-149)/(58-68) 149/68         Physical Exam  Vitals reviewed.   Constitutional:       Appearance: Normal appearance.   HENT:      Mouth/Throat:      Mouth: Mucous membranes are moist.   Eyes:      Pupils: Pupils are equal, round, and reactive to light.   Cardiovascular:      Rate and Rhythm: Normal rate and regular rhythm.      Comments: R biphasic DP and PT   L monophasic DP and biphasic PT  Pulmonary:      Effort: Pulmonary effort is normal.   Abdominal:      General: Abdomen is flat.      Palpations: Abdomen is soft.   Skin:     General: Skin is warm and dry.      Capillary Refill:  Capillary refill takes less than 2 seconds.      Comments: R thigh incision well-appearing  L groin dressing- small portion of breakdown, no drainage   R foot TMA dressed per podiatry    Neurological:      General: No focal deficit present.      Mental Status: She is alert.   Psychiatric:         Mood and Affect: Mood normal.       Significant Labs:  CBC:   Recent Labs   Lab 03/14/23 0217   WBC 16.90*   RBC 2.98*   HGB 7.9*   HCT 25.0*   *   MCV 84   MCH 26.5*   MCHC 31.6*     CMP:   Recent Labs   Lab 03/14/23 0217   *   CALCIUM 8.8   ALBUMIN 1.9*   PROT 7.2      K 4.4   CO2 28      BUN 58*   CREATININE 1.4   ALKPHOS 82   ALT 20   AST 37   BILITOT 0.7       Significant Diagnostics:  I have reviewed all pertinent imaging results/findings within the past 24 hours.

## 2023-03-14 NOTE — ASSESSMENT & PLAN NOTE
- no episodes in the last 24 hours  - caution with insulin titration considering age, kidney function and variable appetite

## 2023-03-14 NOTE — PROGRESS NOTES
[facility] Medicine  Progress Note    Patient Name: Nelsy Marino  MRN: [id number]  Patient Class: IP- Inpatient   Admission Date: 2/27/2023  Length of Stay: 15 days  Attending Physician: Tanner Coello MD  Primary Care Provider: Juan F Garay        Subjective:     Principal Problem:Critical limb ischemia with history of revascularization of same extremity        HPI:  This is an 82-year-old female with a past medical history of severe PAD, hypertension, type 2 diabetes, history of DVT who presents with right foot pain.    Patient reports worsening right foot pain, swelling and discoloration that started after she had an ingrown toenail removed by her podiatrist.  She reports that her discoloration started since 2/7. Of note, the patient underwent an angiogram on 02/23 by vascular surgery. In the ED, she was tachycardic (112), and hypertensive.  Labs showed microcytic anemia (10.2), elevated sed rate (112), elevated CRP (56.5), elevated lactic acid (2.5).  Foot x-ray showed DJD and spurs on the calcaneus with no acute process.  Vascular surgery was consulted and recommended continuing antibiotics and evaluating in the morning.  She was given morphine 4 mg IV, vancomycin, Zosyn and was admitted for further management.      Overview/Hospital Course:   82-year-old female with a past medical history of severe PAD, hypertension, type 2 diabetes, history of DVT admitted on 02/27/2023 for gangrene of right toes. Of note, patient recent underwent LE angiogram by Dr. Tran on 02/23/23. Per OP report, was able to get successful revascularization / resolution of stenosis. XR right foot with no fracture, dislocation, bone destruction, or foreign body seen.  There is DJD and spurs on the calcaneus.  Was started on IV Zosyn and vancomycin on admit. Blood  cx with NGTD. On 02/28, patient with hypoglycemia but was asymptomatic. Improved with glucogen. Likely due to NPO status and was still receiving  insulin.  Cardiology consulted for preoperative clearance.  Recommended stress test.  Normal myocardial perfusion scan.  Cardiology deem patient at acceptable cardiac risk for vasc surgery/anesthesia.  Vascular surgery consulted and patient underwent fem fem bypass on 3/2/2023. Podiatry also consulted and patient underwent right TMA on 3/4/23.     Stepped down to Hospital Medicine service. O2 requirement steadily improving while on diuresis.  Patient notes significant posterior ankle pain. Increased tramadol and ordered robaxin. Good urine output. Patient continues to note sleep disturbances mainly due to BLE pain. Ordering transfusion for Hb 6.4. WBC continues to rise from 13 to 15. Per vascular surgery, continuing wound care for right TMA and no evidence of active infection at the surgical site. Per ID, continuing supportive care and diuresis and discontinuing IV zosyn. Also will obtain respiratory cultures, order C diff panel and followup CT chest.            Interval History: Patient lying in bed, no acute distress. Family at bedside. Overnight, patient had decreased level of consciousness and rapid response called. Discontinued gabapentin, depakote and robaxin. Mental status improved back to baseline. She had bowel movement this morning that was pastry. Stool occult pending. Holding home Eliquis due to drop in H/H. Will consult GI in AM. Per ID, started meropenem and will continue linezolid.       Intake/Output Summary (Last 24 hours) at 3/14/2023 0315  Last data filed at 3/13/2023 1715  Gross per 24 hour   Intake 2 ml   Output --   Net 2 ml           Review of Systems   Constitutional:  Positive for activity change and fatigue. Negative for chills and fever.   HENT:  Negative for congestion.    Eyes:  Negative for visual disturbance.   Respiratory:  Positive for cough and shortness of breath.    Cardiovascular:  Positive for leg swelling. Negative for chest pain.   Gastrointestinal:  Negative for abdominal  distention, abdominal pain, nausea and vomiting.   Genitourinary:  Negative for dysuria.   Musculoskeletal:  Positive for arthralgias (right foot) and myalgias (right foot). Negative for back pain.   Skin:  Negative for rash and wound.   Neurological:  Positive for weakness. Negative for dizziness.   Psychiatric/Behavioral:  Negative for confusion.      Objective:     Vital Signs (Most Recent):  Temp: 99.1 °F (37.3 °C) (03/13/23 2338)  Pulse: 80 (03/13/23 2338)  Resp: 16 (03/13/23 2338)  BP: 132/63 (03/13/23 2338)  SpO2: (!) 94 % (03/13/23 2338)   Vital Signs (24h Range):  Temp:  [97.3 °F (36.3 °C)-99.1 °F (37.3 °C)] 99.1 °F (37.3 °C)  Pulse:  [77-82] 80  Resp:  [16-21] 16  SpO2:  [92 %-97 %] 94 %  BP: (123-152)/(58-72) 132/63     Weight: 73.5 kg (162 lb 0.6 oz)  Body mass index is 27.81 kg/m².    Intake/Output Summary (Last 24 hours) at 3/14/2023 0315  Last data filed at 3/13/2023 1715  Gross per 24 hour   Intake 2 ml   Output --   Net 2 ml        Physical Exam  Constitutional:       Appearance: Normal appearance.   HENT:      Head: Normocephalic.      Mouth/Throat:      Mouth: Mucous membranes are moist.   Eyes:      Extraocular Movements: Extraocular movements intact.      Pupils: Pupils are equal, round, and reactive to light.   Neck:      Comments: JVD absent  Cardiovascular:      Rate and Rhythm: Normal rate and regular rhythm.      Pulses: Normal pulses.      Heart sounds: Normal heart sounds.   Pulmonary:      Effort: Respiratory distress present.      Breath sounds: Normal breath sounds.   Abdominal:      General: Bowel sounds are normal. There is no distension.      Palpations: Abdomen is soft.      Tenderness: There is no abdominal tenderness.   Musculoskeletal:         General: Normal range of motion.      Cervical back: Neck supple.      Right lower leg: Edema present.      Left lower leg: Edema present.      Comments: Right TMA with dressing c/d/I and surgical shoe in place   Neurological:       General: No focal deficit present.      Mental Status: She is alert.   Psychiatric:         Mood and Affect: Mood normal.         Cognition and Memory: Cognition is impaired. Memory is impaired.       Significant Labs: A1C:   Recent Labs   Lab 02/28/23  0549   HGBA1C 8.2*       Blood Culture:   No results for input(s): LABBLOO in the last 48 hours.    CBC:   Recent Labs   Lab 03/12/23  0420 03/12/23 2054 03/13/23  0809   WBC 20.05*  --  19.34*   HGB 8.3*  --  7.9*   HCT 26.1* 25* 26.2*   *  --  472*       CMP:   Recent Labs   Lab 03/12/23  0420 03/13/23  0809    137   K 3.8 4.4   CL 98 98   CO2 31* 27   GLU 93 162*   BUN 57* 63*   CREATININE 1.2 1.5*   CALCIUM 9.1 8.9   PROT 7.2 7.4   ALBUMIN 2.1* 2.0*   BILITOT 0.7 0.6   ALKPHOS 68 67   AST 31 39   ALT 17 20   ANIONGAP 11 12       Coagulation:   Recent Labs   Lab 03/13/23  0809   APTT 32.8*       Lactic Acid:   No results for input(s): LACTATE in the last 48 hours.    Magnesium:   Recent Labs   Lab 03/13/23  0809   MG 2.6       POCT Glucose:   Recent Labs   Lab 03/13/23  1118 03/13/23  1607 03/13/23  1931   POCTGLUCOSE 187* 182* 136*           Significant Imaging: I have reviewed all pertinent imaging results/findings within the past 24 hours.      Assessment/Plan:      * Critical limb ischemia with history of revascularization of same extremity  -Vascular surgery consult. apprec recs  -- s/p fem fem bypass on 3/2/2023  -- s/p right TMA on 3/4/2023.   --No concern for surgical incision infection at this time.  -- low threshold to escalate antibiotics. If her graft becomes infected and requires antibiotics, she has no other options left for revascularizations. ID following  -- Q4H neurovasc checks  -- ASA/plavix/Statin/Eliquis. Hepatin gtt in replace of eliquis for now due to worsening anemia. Discontinued AC due to drop in Hb.   -- PT/OT  --Recommend PRBC transfusion  --Consider GI eval or imaging   --UA cultures negative, blood cultures no growth to  "date, wound cultures NGTD, cdiff pending.  -Podiatry consulted. apprec recs  --Dressing changes by nursing.  --RLE WB to heel in surgical shoe for transfers or short distances.   --Elevate RLE at all times while in bed or chair.  -- Continuing to monitor right TMA site. If foot worsens or no other source is found then may consider opening of incision and exploration of surgical wound   - outpatient podiatry wound care instructions when patient medically ready for discharge: Please order ambulatory referral to podiatry for outpatient followup. RLE WB to heel in surgical shoe for transfers or short distances. HH/facility dressing changes R foot 3x/week: cleanse incision with saline, apply betadine soaked adaptic, apply gauze, wrap with cast padding and coban.       Acute encephalopathy  - on 3/12, patient more somnolent and had tremors  - discontinued gabapentin and depakote  - Scr back to baseline so low suspicion for uremia   - continue antibiotic for possible infection   - RESOLVED      Leukocytosis  - see "sepsis"      S/P transmetatarsal amputation of foot, right  - stable  - appreciated vascular surgery and podiatry recs  -- s/p TMA 3/4  -- low threshold to escalate antibiotics. If her graft becomes infected and requires antibiotics, she has no other options left for revascularizations  -- Q4H neurovasc checks  -- ASA/plavix/Statin/Eliquis   -- PT/OT  --Dressing changes by nursing.  --RLE WB to heel in surgical shoe for transfers or short distances.   --Elevate RLE at all times while in bed or chair.  -- DC Instructions: Please order ambulatory referral to podiatry for outpatient followup. RLE WB to heel in surgical shoe for transfers or short distances. HH/facility dressing changes R foot 3x/week: cleanse incision with saline, apply betadine soaked adaptic, apply gauze, wrap with cast padding and coban.   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 Started linezolid      Hypoglycemia  Better now  Continue levemir and LDSS    Sepsis  -Right foot appears to have wet gangrene on exam. S/p right TMA on 3/4  - fever 100.5 on 3/9  - WBC risin --> 15 --> 18  - procal slightly elevated at 0.65  - ESR 64,   - per vascular surgery, right TMA does not appear infected  - per podiatry, if no other infectious source identified, will consider opening of incision and exploration of surgical wound.   - ID consulted. apprec recs  -- discontinued IV zosyn. Started linezolid to cover for SSTI  -- continue supportive care  -- blood culture NGTD  - stool sample for c diff panel could not be obtained   -- followup sputum culture, RIP  -- CT C/A/P negative for infection        History of DVT (deep vein thrombosis)  Not on AC outpatient.    - heparin gtt in replace of eliquis for now until Hb stable and no additional surgical intervention indicated  - was switched to eliquis but now holding AC on 3/11 due to drop in Hb. No evidence of active bleeding so will reassess H/H tomrrow and if stable will resume eliquis.    Acute hypoxemic respiratory failure  Patient with Hypercapnic and Hypoxic Respiratory failure which is Acute on chronic.  she is not on home oxygen. Supplemental oxygen was provided and noted-  .   Signs/symptoms of respiratory failure include- tachypnea, increased work of breathing and lethargy. Contributing diagnoses includes - CHF, Obesity Hypoventilation and Pneumonia Labs and images were reviewed. Patient Has recent ABG, which has been reviewed. Will treat underlying causes and adjust management of respiratory failure.    PLAN:  - increased from 2 L NC to 12 L NC on 3/78  - now weaned to 2 L NC  - CXR showed pulmonary edema with no consolidation  - switched to xoponex due to tremors on duonebs  - incentive spirometry   - mucinex and prn tessalon  perles  - chest PT and acapella   - OOB to chair  - IMPROVING        Type 2 diabetes mellitus  Stable from hypoglycemia standpoint; continue levemir and LDSSI      Essential hypertension  -Continue home medications:  Amlodipine 10 mg daily, hydrochlorothiazide 25 mg daily, losartan 100 mg daily        VTE Risk Mitigation (From admission, onward)         Ordered     Reason for No Pharmacological VTE Prophylaxis  Once        Question:  Reasons:  Answer:  IV Heparin w/in 24 hrs. Pre or Post-Op    03/02/23 1918     IP VTE HIGH RISK PATIENT  Once         03/02/23 1918     Place sequential compression device  Until discontinued         02/27/23 2200                Discharge Planning   JUAN ALBERTO: 3/15/2023     Code Status: Full Code   Is the patient medically ready for discharge?: No    Reason for patient still in hospital (select all that apply): Patient unstable, Patient trending condition, Laboratory test, Treatment, Consult recommendations, PT / OT recommendations and Pending disposition  Discharge Plan A: Long-term acute care facility (LTAC)   Discharge Delays: None known at this time        Time spent in care of patient: > 35 minutes         Tanner Coello MD  Department of Hospital Medicine   [facility]

## 2023-03-14 NOTE — PROGRESS NOTES
Pharmacist Renal Dose Adjustment Note    Nelsy Marino is a 82 y.o. female being treated with the medication meropenem.    Patient Data:    Vital Signs (Most Recent):  Temp: 99 °F (37.2 °C) (03/13/23 1228)  Pulse: 77 (03/13/23 1554)  Resp: (!) 21 (03/13/23 1554)  BP: (!) 129/59 (03/13/23 1228)  SpO2: 97 % (03/13/23 1624)   Vital Signs (72h Range):  Temp:  [97.3 °F (36.3 °C)-99.4 °F (37.4 °C)]   Pulse:  [73-99]   Resp:  [16-21]   BP: (119-159)/(51-88)   SpO2:  [92 %-100 %]      Recent Labs   Lab 03/11/23  0425 03/12/23  0420 03/13/23  0809   CREATININE 1.5* 1.2 1.5*     Serum creatinine: 1.5 mg/dL (H) 03/13/23 0809  Estimated creatinine clearance: 28.4 mL/min (A)    Medication:meropenem dose: 2 g  frequency every 12 hours will be changed to medication:meropenem dose:1 g frequency:every 12 hours    Pharmacist's Name: Mahesh Durbin  Pharmacist's Extension: [phone]

## 2023-03-14 NOTE — SUBJECTIVE & OBJECTIVE
"Interval HPI:   Blood sugar reasonably controlled in the last 24 hours.     /63 (BP Location: Left arm, Patient Position: Lying)   Pulse 84   Temp 98.8 °F (37.1 °C) (Oral)   Resp 18   Ht 5' 4" (1.626 m)   Wt 73.5 kg (162 lb 0.6 oz)   LMP  (LMP Unknown)   SpO2 98%   BMI 27.81 kg/m²     Labs Reviewed and Include    Recent Labs   Lab 03/14/23  0217   *   CALCIUM 8.8   ALBUMIN 1.9*   PROT 7.2      K 4.4   CO2 28      BUN 58*   CREATININE 1.4   ALKPHOS 82   ALT 20   AST 37   BILITOT 0.7     Lab Results   Component Value Date    WBC 16.90 (H) 03/14/2023    HGB 7.9 (L) 03/14/2023    HCT 25.0 (L) 03/14/2023    MCV 84 03/14/2023     (H) 03/14/2023     No results for input(s): TSH, FREET4 in the last 168 hours.  Lab Results   Component Value Date    HGBA1C 8.2 (H) 02/28/2023       Nutritional status:   Body mass index is 27.81 kg/m².  Lab Results   Component Value Date    ALBUMIN 1.9 (L) 03/14/2023    ALBUMIN 2.0 (L) 03/13/2023    ALBUMIN 2.1 (L) 03/12/2023     No results found for: PREALBUMIN    Estimated Creatinine Clearance: 30.4 mL/min (based on SCr of 1.4 mg/dL).    Accu-Checks  Recent Labs     03/12/23  1017 03/12/23  1035 03/12/23  1441 03/12/23  1659 03/12/23  2003 03/13/23  0856 03/13/23  1118 03/13/23  1607 03/13/23  1931 03/14/23  0834   POCTGLUCOSE 216* 175* 141* 124* 100 226* 187* 182* 136* 174*       Current Medications and/or Treatments Impacting Glycemic Control  Immunotherapy:    Immunosuppressants       None          Steroids:   Hormones (From admission, onward)      Start     Stop Route Frequency Ordered    03/09/23 2100  melatonin tablet 6 mg         -- Oral Nightly 03/09/23 1546          Pressors:    Autonomic Drugs (From admission, onward)      None          Hyperglycemia/Diabetes Medications:   Antihyperglycemics (From admission, onward)      Start     Stop Route Frequency Ordered    03/13/23 1200  insulin detemir U-100 pen 12 Units         -- SubQ Daily 03/13/23 " 1057    03/13/23 1130  insulin aspart U-100 pen 4 Units         -- SubQ 3 times daily with meals 03/13/23 1058    03/12/23 0645  insulin aspart U-100 pen 0-5 Units         -- SubQ Before meals & nightly 03/11/23 4316

## 2023-03-14 NOTE — ASSESSMENT & PLAN NOTE
- Blood glucose goal 140-180  - Blood sugars mostly at goal in the last 24 hours    Plan:  - Levemir 12 units daily  - Aspart 4 units before meals  - Low dose correction scale insulin as needed  - BG checks AC/HS and at 2 am  - Hypoglycemia protocol in place  - If blood glucose greater than 300, please ask patient not to eat food or drink anything other than water until correctional insulin has brought it back below 250  - Please notify Endocrine for any change and/or advance in diet

## 2023-03-14 NOTE — SUBJECTIVE & OBJECTIVE
Interval History: Patient lying in bed, no acute distress. Family at bedside. Overnight, patient had decreased level of consciousness and rapid response called. Discontinued gabapentin, depakote and robaxin. Mental status improved back to baseline. She had bowel movement this morning that was pastry. Stool occult pending. Holding home Eliquis due to drop in H/H. Will consult GI in AM. Per ID, started meropenem and will continue linezolid.       Intake/Output Summary (Last 24 hours) at 3/14/2023 0315  Last data filed at 3/13/2023 1715  Gross per 24 hour   Intake 2 ml   Output --   Net 2 ml           Review of Systems   Constitutional:  Positive for activity change and fatigue. Negative for chills and fever.   HENT:  Negative for congestion.    Eyes:  Negative for visual disturbance.   Respiratory:  Positive for cough and shortness of breath.    Cardiovascular:  Positive for leg swelling. Negative for chest pain.   Gastrointestinal:  Negative for abdominal distention, abdominal pain, nausea and vomiting.   Genitourinary:  Negative for dysuria.   Musculoskeletal:  Positive for arthralgias (right foot) and myalgias (right foot). Negative for back pain.   Skin:  Negative for rash and wound.   Neurological:  Positive for weakness. Negative for dizziness.   Psychiatric/Behavioral:  Negative for confusion.      Objective:     Vital Signs (Most Recent):  Temp: 99.1 °F (37.3 °C) (03/13/23 2338)  Pulse: 80 (03/13/23 2338)  Resp: 16 (03/13/23 2338)  BP: 132/63 (03/13/23 2338)  SpO2: (!) 94 % (03/13/23 2338)   Vital Signs (24h Range):  Temp:  [97.3 °F (36.3 °C)-99.1 °F (37.3 °C)] 99.1 °F (37.3 °C)  Pulse:  [77-82] 80  Resp:  [16-21] 16  SpO2:  [92 %-97 %] 94 %  BP: (123-152)/(58-72) 132/63     Weight: 73.5 kg (162 lb 0.6 oz)  Body mass index is 27.81 kg/m².    Intake/Output Summary (Last 24 hours) at 3/14/2023 0315  Last data filed at 3/13/2023 1715  Gross per 24 hour   Intake 2 ml   Output --   Net 2 ml        Physical  Exam  Constitutional:       Appearance: Normal appearance.   HENT:      Head: Normocephalic.      Mouth/Throat:      Mouth: Mucous membranes are moist.   Eyes:      Extraocular Movements: Extraocular movements intact.      Pupils: Pupils are equal, round, and reactive to light.   Neck:      Comments: JVD absent  Cardiovascular:      Rate and Rhythm: Normal rate and regular rhythm.      Pulses: Normal pulses.      Heart sounds: Normal heart sounds.   Pulmonary:      Effort: Respiratory distress present.      Breath sounds: Normal breath sounds.   Abdominal:      General: Bowel sounds are normal. There is no distension.      Palpations: Abdomen is soft.      Tenderness: There is no abdominal tenderness.   Musculoskeletal:         General: Normal range of motion.      Cervical back: Neck supple.      Right lower leg: Edema present.      Left lower leg: Edema present.      Comments: Right TMA with dressing c/d/I and surgical shoe in place   Neurological:      General: No focal deficit present.      Mental Status: She is alert.   Psychiatric:         Mood and Affect: Mood normal.         Cognition and Memory: Cognition is impaired. Memory is impaired.       Significant Labs: A1C:   Recent Labs   Lab 02/28/23  0549   HGBA1C 8.2*       Blood Culture:   No results for input(s): LABBLOO in the last 48 hours.    CBC:   Recent Labs   Lab 03/12/23  0420 03/12/23 2054 03/13/23  0809   WBC 20.05*  --  19.34*   HGB 8.3*  --  7.9*   HCT 26.1* 25* 26.2*   *  --  472*       CMP:   Recent Labs   Lab 03/12/23  0420 03/13/23  0809    137   K 3.8 4.4   CL 98 98   CO2 31* 27   GLU 93 162*   BUN 57* 63*   CREATININE 1.2 1.5*   CALCIUM 9.1 8.9   PROT 7.2 7.4   ALBUMIN 2.1* 2.0*   BILITOT 0.7 0.6   ALKPHOS 68 67   AST 31 39   ALT 17 20   ANIONGAP 11 12       Coagulation:   Recent Labs   Lab 03/13/23  0809   APTT 32.8*       Lactic Acid:   No results for input(s): LACTATE in the last 48 hours.    Magnesium:   Recent Labs   Lab  03/13/23  0809   MG 2.6       POCT Glucose:   Recent Labs   Lab 03/13/23  1118 03/13/23  1607 03/13/23  1931   POCTGLUCOSE 187* 182* 136*           Significant Imaging: I have reviewed all pertinent imaging results/findings within the past 24 hours.

## 2023-03-15 LAB
ALBUMIN SERPL BCP-MCNC: 1.9 G/DL (ref 3.5–5.2)
ALP SERPL-CCNC: 65 U/L (ref 55–135)
ALT SERPL W/O P-5'-P-CCNC: 28 U/L (ref 10–44)
ANION GAP SERPL CALC-SCNC: 10 MMOL/L (ref 8–16)
APTT BLDCRRT: 35.7 SEC (ref 21–32)
AST SERPL-CCNC: 54 U/L (ref 10–40)
BACTERIA BLD CULT: NORMAL
BACTERIA BLD CULT: NORMAL
BASOPHILS # BLD AUTO: 0.02 K/UL (ref 0–0.2)
BASOPHILS NFR BLD: 0.1 % (ref 0–1.9)
BILIRUB SERPL-MCNC: 0.6 MG/DL (ref 0.1–1)
BUN SERPL-MCNC: 48 MG/DL (ref 8–23)
CALCIUM SERPL-MCNC: 9 MG/DL (ref 8.7–10.5)
CHLORIDE SERPL-SCNC: 102 MMOL/L (ref 95–110)
CO2 SERPL-SCNC: 30 MMOL/L (ref 23–29)
CREAT SERPL-MCNC: 1.1 MG/DL (ref 0.5–1.4)
DIFFERENTIAL METHOD: ABNORMAL
EOSINOPHIL # BLD AUTO: 0.2 K/UL (ref 0–0.5)
EOSINOPHIL NFR BLD: 1.3 % (ref 0–8)
ERYTHROCYTE [DISTWIDTH] IN BLOOD BY AUTOMATED COUNT: 15.9 % (ref 11.5–14.5)
EST. GFR  (NO RACE VARIABLE): 50.2 ML/MIN/1.73 M^2
GLUCOSE SERPL-MCNC: 149 MG/DL (ref 70–110)
HCT VFR BLD AUTO: 25.9 % (ref 37–48.5)
HGB BLD-MCNC: 8 G/DL (ref 12–16)
IMM GRANULOCYTES # BLD AUTO: 0.11 K/UL (ref 0–0.04)
IMM GRANULOCYTES NFR BLD AUTO: 0.7 % (ref 0–0.5)
LYMPHOCYTES # BLD AUTO: 0.8 K/UL (ref 1–4.8)
LYMPHOCYTES NFR BLD: 5.1 % (ref 18–48)
MCH RBC QN AUTO: 26.4 PG (ref 27–31)
MCHC RBC AUTO-ENTMCNC: 30.9 G/DL (ref 32–36)
MCV RBC AUTO: 86 FL (ref 82–98)
MONOCYTES # BLD AUTO: 0.6 K/UL (ref 0.3–1)
MONOCYTES NFR BLD: 3.9 % (ref 4–15)
NEUTROPHILS # BLD AUTO: 14 K/UL (ref 1.8–7.7)
NEUTROPHILS NFR BLD: 88.9 % (ref 38–73)
NRBC BLD-RTO: 0 /100 WBC
PLATELET # BLD AUTO: 528 K/UL (ref 150–450)
PMV BLD AUTO: 10.1 FL (ref 9.2–12.9)
POCT GLUCOSE: 123 MG/DL (ref 70–110)
POCT GLUCOSE: 158 MG/DL (ref 70–110)
POCT GLUCOSE: 228 MG/DL (ref 70–110)
POCT GLUCOSE: 272 MG/DL (ref 70–110)
POTASSIUM SERPL-SCNC: 4.1 MMOL/L (ref 3.5–5.1)
PROT SERPL-MCNC: 7.4 G/DL (ref 6–8.4)
RBC # BLD AUTO: 3.03 M/UL (ref 4–5.4)
SODIUM SERPL-SCNC: 142 MMOL/L (ref 136–145)
WBC # BLD AUTO: 15.71 K/UL (ref 3.9–12.7)

## 2023-03-15 PROCEDURE — 25000003 PHARM REV CODE 250: Performed by: INTERNAL MEDICINE

## 2023-03-15 PROCEDURE — 36415 COLL VENOUS BLD VENIPUNCTURE: CPT | Performed by: STUDENT IN AN ORGANIZED HEALTH CARE EDUCATION/TRAINING PROGRAM

## 2023-03-15 PROCEDURE — 94761 N-INVAS EAR/PLS OXIMETRY MLT: CPT

## 2023-03-15 PROCEDURE — 25000242 PHARM REV CODE 250 ALT 637 W/ HCPCS: Performed by: INTERNAL MEDICINE

## 2023-03-15 PROCEDURE — 63600175 PHARM REV CODE 636 W HCPCS

## 2023-03-15 PROCEDURE — 94640 AIRWAY INHALATION TREATMENT: CPT

## 2023-03-15 PROCEDURE — 85730 THROMBOPLASTIN TIME PARTIAL: CPT | Performed by: STUDENT IN AN ORGANIZED HEALTH CARE EDUCATION/TRAINING PROGRAM

## 2023-03-15 PROCEDURE — 25000003 PHARM REV CODE 250: Performed by: STUDENT IN AN ORGANIZED HEALTH CARE EDUCATION/TRAINING PROGRAM

## 2023-03-15 PROCEDURE — 99232 SBSQ HOSP IP/OBS MODERATE 35: CPT | Mod: ,,, | Performed by: INTERNAL MEDICINE

## 2023-03-15 PROCEDURE — 63600175 PHARM REV CODE 636 W HCPCS: Performed by: STUDENT IN AN ORGANIZED HEALTH CARE EDUCATION/TRAINING PROGRAM

## 2023-03-15 PROCEDURE — 99232 PR SUBSEQUENT HOSPITAL CARE,LEVL II: ICD-10-PCS | Mod: ,,, | Performed by: INTERNAL MEDICINE

## 2023-03-15 PROCEDURE — 99232 SBSQ HOSP IP/OBS MODERATE 35: CPT | Mod: GC,,, | Performed by: INTERNAL MEDICINE

## 2023-03-15 PROCEDURE — 97530 THERAPEUTIC ACTIVITIES: CPT

## 2023-03-15 PROCEDURE — 99233 PR SUBSEQUENT HOSPITAL CARE,LEVL III: ICD-10-PCS | Mod: ,,, | Performed by: INTERNAL MEDICINE

## 2023-03-15 PROCEDURE — 94664 DEMO&/EVAL PT USE INHALER: CPT

## 2023-03-15 PROCEDURE — 99900035 HC TECH TIME PER 15 MIN (STAT)

## 2023-03-15 PROCEDURE — 85025 COMPLETE CBC W/AUTO DIFF WBC: CPT | Performed by: STUDENT IN AN ORGANIZED HEALTH CARE EDUCATION/TRAINING PROGRAM

## 2023-03-15 PROCEDURE — 27100171 HC OXYGEN HIGH FLOW UP TO 24 HOURS

## 2023-03-15 PROCEDURE — 20600001 HC STEP DOWN PRIVATE ROOM

## 2023-03-15 PROCEDURE — 99233 SBSQ HOSP IP/OBS HIGH 50: CPT | Mod: ,,, | Performed by: INTERNAL MEDICINE

## 2023-03-15 PROCEDURE — 94668 MNPJ CHEST WALL SBSQ: CPT

## 2023-03-15 PROCEDURE — 99232 PR SUBSEQUENT HOSPITAL CARE,LEVL II: ICD-10-PCS | Mod: GC,,, | Performed by: INTERNAL MEDICINE

## 2023-03-15 PROCEDURE — 80053 COMPREHEN METABOLIC PANEL: CPT | Performed by: STUDENT IN AN ORGANIZED HEALTH CARE EDUCATION/TRAINING PROGRAM

## 2023-03-15 RX ORDER — HYDROMORPHONE HYDROCHLORIDE 1 MG/ML
0.2 INJECTION, SOLUTION INTRAMUSCULAR; INTRAVENOUS; SUBCUTANEOUS EVERY 4 HOURS PRN
Status: DISCONTINUED | OUTPATIENT
Start: 2023-03-15 | End: 2023-03-20 | Stop reason: HOSPADM

## 2023-03-15 RX ORDER — GABAPENTIN 300 MG/1
300 CAPSULE ORAL 3 TIMES DAILY
Status: CANCELLED | OUTPATIENT
Start: 2023-03-15

## 2023-03-15 RX ADMIN — METHOCARBAMOL 500 MG: 500 TABLET ORAL at 09:03

## 2023-03-15 RX ADMIN — INSULIN DETEMIR 12 UNITS: 100 INJECTION, SOLUTION SUBCUTANEOUS at 09:03

## 2023-03-15 RX ADMIN — MEROPENEM 2 G: 1 INJECTION INTRAVENOUS at 09:03

## 2023-03-15 RX ADMIN — INSULIN ASPART 3 UNITS: 100 INJECTION, SOLUTION INTRAVENOUS; SUBCUTANEOUS at 11:03

## 2023-03-15 RX ADMIN — ACETAMINOPHEN 650 MG: 325 TABLET ORAL at 12:03

## 2023-03-15 RX ADMIN — LEVALBUTEROL 1.25 MG: 1.25 SOLUTION, CONCENTRATE RESPIRATORY (INHALATION) at 09:03

## 2023-03-15 RX ADMIN — INSULIN ASPART 4 UNITS: 100 INJECTION, SOLUTION INTRAVENOUS; SUBCUTANEOUS at 04:03

## 2023-03-15 RX ADMIN — LEVALBUTEROL 1.25 MG: 1.25 SOLUTION, CONCENTRATE RESPIRATORY (INHALATION) at 05:03

## 2023-03-15 RX ADMIN — DULOXETINE 30 MG: 30 CAPSULE, DELAYED RELEASE ORAL at 09:03

## 2023-03-15 RX ADMIN — METHOCARBAMOL 500 MG: 500 TABLET ORAL at 04:03

## 2023-03-15 RX ADMIN — LINEZOLID 600 MG: 600 TABLET, FILM COATED ORAL at 09:03

## 2023-03-15 RX ADMIN — GUAIFENESIN AND DEXTROMETHORPHAN HYDROBROMIDE 1 TABLET: 600; 30 TABLET, EXTENDED RELEASE ORAL at 09:03

## 2023-03-15 RX ADMIN — FUROSEMIDE 40 MG: 40 TABLET ORAL at 09:03

## 2023-03-15 RX ADMIN — ASPIRIN 81 MG: 81 TABLET, COATED ORAL at 09:03

## 2023-03-15 RX ADMIN — INSULIN ASPART 4 UNITS: 100 INJECTION, SOLUTION INTRAVENOUS; SUBCUTANEOUS at 09:03

## 2023-03-15 RX ADMIN — CLOPIDOGREL BISULFATE 75 MG: 75 TABLET ORAL at 09:03

## 2023-03-15 RX ADMIN — INSULIN ASPART 2 UNITS: 100 INJECTION, SOLUTION INTRAVENOUS; SUBCUTANEOUS at 09:03

## 2023-03-15 RX ADMIN — HYDROMORPHONE HYDROCHLORIDE 0.2 MG: 1 INJECTION, SOLUTION INTRAMUSCULAR; INTRAVENOUS; SUBCUTANEOUS at 02:03

## 2023-03-15 RX ADMIN — AMLODIPINE BESYLATE 10 MG: 10 TABLET ORAL at 09:03

## 2023-03-15 RX ADMIN — APIXABAN 5 MG: 5 TABLET, FILM COATED ORAL at 09:03

## 2023-03-15 RX ADMIN — METHOCARBAMOL 500 MG: 500 TABLET ORAL at 12:03

## 2023-03-15 RX ADMIN — FUROSEMIDE 40 MG: 40 TABLET ORAL at 05:03

## 2023-03-15 RX ADMIN — TRAMADOL HYDROCHLORIDE 50 MG: 50 TABLET, COATED ORAL at 04:03

## 2023-03-15 RX ADMIN — INSULIN ASPART 4 UNITS: 100 INJECTION, SOLUTION INTRAVENOUS; SUBCUTANEOUS at 11:03

## 2023-03-15 RX ADMIN — ATORVASTATIN CALCIUM 40 MG: 40 TABLET, FILM COATED ORAL at 09:03

## 2023-03-15 NOTE — PROGRESS NOTES
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    She underwent left external iliac to R SFA bypass with PTFE, left common + ext iliac artery stenting, R SFA angioplasty with drug coated balloon on 3/8/23. 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    She remains afebrile, with improved leukocytosis, wound dehiscence at TMA site, cultures now show growth of pseudomonas fluorescens (3/10).  No other localizing symptoms of infection. Patholgy shows no evidence of acute osteomyelitis on surgical margins.     Recommendations    - Linezolid stops today, Meropenem can be transitioned to ciprofloxacin 500 mg q12 hours to finish total 14 days of therapy for pseudomonas fluorescens SSTI. EOT 3/23/23. After this point, patient can resume her cephalexin 1000 mg BID Ppx regimen for chronic suppression of prior graft infection.                   Anticipated Disposition: TBD    Thank you for your consult. I will sign off. Please contact us if you have any additional questions.    Krunal Miranda MD  Infectious Disease  [facility] -  [facility]    Subjective:     Principal Problem:Critical limb ischemia with history of revascularization of same extremity    HPI: 82 year old female with a history of HTN, DMII, DVT, CHF, PAD, DVT and a history of prior fem-fem 20 years prior that occluded s/p R iliofemoral bypass.  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  She is admitted for worsening right foot pain, swelling and discoloration following removal of an ingrown toe nail.  Patient was diagnosed with dry gangrene of the foot.  She underwent Left external iliac to right SFA bypass with 8mm PTFE on March 2.  She then underwent transmetatarsal amputation of the right foot on March 4.  Cultures from proximal bone margins were negative but the pathology is pending.  ID is consulted to assist with her care.         Interval History: resting comfortably, no new events    Review of Systems  Constitutional:  Positive for chills and fatigue. Negative for activity change, appetite change and fever.   Respiratory:  Negative for cough and shortness of breath.    Gastrointestinal:  Negative for abdominal distention, blood in stool and vomiting.   Genitourinary:  Negative for dysuria, hematuria and urgency.   Skin:  Positive for rash and wound.   Objective:     Vital Signs (Most Recent):  Temp: 99.8 °F (37.7 °C) (03/15/23 1612)  Pulse: 85 (03/15/23 1721)  Resp: 18 (03/15/23 1721)  BP: (!) 145/67 (03/15/23 1551)  SpO2: 97 % (03/15/23 1721)   Vital Signs (24h Range):  Temp:  [96 °F (35.6 °C)-100.3 °F (37.9 °C)] 99.8 °F (37.7 °C)  Pulse:  [73-87] 85  Resp:  [16-30] 18  SpO2:  [90 %-99 %] 97 %  BP: (120-145)/(55-67) 145/67     Weight: 73.5 kg (162 lb 0.6 oz)  Body mass index is 27.81 kg/m².    Estimated Creatinine Clearance: 38.7 mL/min (based on SCr of 1.1 mg/dL).    Physical Exam  Constitutional:       Appearance: Normal appearance. She is  ill-appearing. She is not toxic-appearing.   HENT:      Head: Normocephalic and atraumatic.      Nose: Nose normal.      Mouth/Throat:      Mouth: Mucous membranes are moist.      Pharynx: Oropharynx is clear.   Eyes:      Extraocular Movements: Extraocular movements intact.      Conjunctiva/sclera: Conjunctivae normal.      Pupils: Pupils are equal, round, and reactive to light.   Cardiovascular:      Rate and Rhythm: Normal rate and regular rhythm.      Pulses: Normal pulses.      Heart sounds: Normal heart sounds.   Pulmonary:      Effort: Pulmonary effort is normal.      Breath sounds: Normal breath sounds.   Abdominal:      General: Abdomen is flat. Bowel sounds are normal.      Palpations: Abdomen is soft.   Musculoskeletal:         General: Tenderness and deformity present. No swelling.      Cervical back: Normal range of motion and neck supple.   Skin:     General: Skin is warm and dry.      Findings: Bruising and lesion present.      Comments: Right TMA with dressing c/d/I and surgical dressing in place      Neurological:      Mental Status: She is alert. Mental status is at baseline.   Psychiatric:         Behavior: Behavior normal.         Thought Content: Thought content normal.   Significant Labs: BMP:   Recent Labs   Lab 03/14/23  0217 03/15/23  0640   * 149*    142   K 4.4 4.1    102   CO2 28 30*   BUN 58* 48*   CREATININE 1.4 1.1   CALCIUM 8.8 9.0   MG 2.8*  --      CBC:   Recent Labs   Lab 03/14/23  0217 03/15/23  0640   WBC 16.90* 15.71*   HGB 7.9* 8.0*   HCT 25.0* 25.9*   * 528*     CMP:   Recent Labs   Lab 03/14/23  0217 03/15/23  0640    142   K 4.4 4.1    102   CO2 28 30*   * 149*   BUN 58* 48*   CREATININE 1.4 1.1   CALCIUM 8.8 9.0   PROT 7.2 7.4   ALBUMIN 1.9* 1.9*   BILITOT 0.7 0.6   ALKPHOS 82 65   AST 37 54*   ALT 20 28   ANIONGAP 13 10     Microbiology Results (last 7 days)       Procedure Component Value Units Date/Time    Aerobic culture  [[id number]]  (Abnormal)  (Susceptibility) Collected: 03/10/23 1340    Order Status: Completed Specimen: Wound from Foot, Right Updated: 03/15/23 1426     Aerobic Bacterial Culture PSEUDOMONAS FLUORESCENS GROUP  Few      Narrative:      Right foot tma site wound    Blood culture [[id number]] Collected: 03/10/23 0942    Order Status: Completed Specimen: Blood from Antecubital, Right Arm Updated: 03/15/23 1212     Blood Culture, Routine No growth after 5 days.    Blood culture [[id number]] Collected: 03/10/23 0943    Order Status: Completed Specimen: Blood from Peripheral, Right Hand Updated: 03/15/23 1212     Blood Culture, Routine No growth after 5 days.    Culture, Anaerobe [[id number]] Collected: 03/10/23 1340    Order Status: Completed Specimen: Wound from Foot, Right Updated: 03/14/23 0942     Anaerobic Culture No anaerobes isolated    Narrative:      Right foot tma site wound    Blood culture [[id number]] Collected: 03/08/23 2325    Order Status: Completed Specimen: Blood from Peripheral, Lower Arm, Right Updated: 03/14/23 0612     Blood Culture, Routine No growth after 5 days.    Narrative:      Right Peripheral    Blood culture [[id number]] Collected: 03/08/23 2325    Order Status: Completed Specimen: Blood from Peripheral, Lower Arm, Left Updated: 03/14/23 0612     Blood Culture, Routine No growth after 5 days.    Narrative:      Left Peripheral    Culture, Anaerobe [[id number]] Collected: 03/04/23 1141    Order Status: Completed Specimen: Incision site from Foot, Right Updated: 03/13/23 1009     Anaerobic Culture No anaerobes isolated    Narrative:      3rd Metatarsal    Respiratory Infection Panel (PCR), Nasopharyngeal [[id number]]     Order Status: No result Specimen: Nasopharyngeal Swab     Culture, Respiratory with Gram Stain [[id number]]     Order Status: No result Specimen: Respiratory     Gram stain [[id number]] Collected: 03/10/23 1340    Order Status: Completed Specimen: Wound from Foot, Right Updated:  03/10/23 1924     Gram Stain Result Rare WBC's      No organisms seen    Narrative:      Right foot tma site wound    Clostridium difficile EIA [[id number]]     Order Status: Canceled Specimen: Stool     Fungus culture [[id number]] Collected: 03/04/23 1141    Order Status: Completed Specimen: Incision site from Foot, Right Updated: 03/09/23 1018     Fungus (Mycology) Culture Culture in progress    Narrative:      3rd Metatarsal          Pathology Results  (Last 10 years)                 03/04/23 1141  Specimen to Pathology, Surgery Other (Podiatry) Final result    Narrative:  Pre-op Diagnosis: Dry gangrene [I96]   Procedure(s):   AMPUTATION, FOOT, TRANSMETATARSAL   Number of specimens: 2   Name of specimens: 1) R Foot Transmetatarsal - Permanent   2) 3rd Metatarsal -   Permanent   Which provider would you like to cc?->ROMI COLLINS   Release to patient->Immediate   Specimen total (fresh, frozen, permanent):->2             All pertinent labs within the past 24 hours have been reviewed.  Recent Lab Results  (Last 5 results in the past 24 hours)        03/15/23  1609   03/15/23  1133   03/15/23  0900   03/15/23  0640   03/14/23  2110        Albumin       1.9         Alkaline Phosphatase       65         ALT       28         Anion Gap       10         aPTT       35.7  Comment: aPTT therapeutic range = 39-69 seconds         AST       54         Baso #       0.02         Basophil %       0.1         BILIRUBIN TOTAL       0.6  Comment: For infants and newborns, interpretation of results should be based  on gestational age, weight and in agreement with clinical  observations.    Premature Infant recommended reference ranges:  Up to 24 hours.............<8.0 mg/dL  Up to 48 hours............<12.0 mg/dL  3-5 days..................<15.0 mg/dL  6-29 days.................<15.0 mg/dL           BUN       48         Calcium       9.0         Chloride       102         CO2       30         Creatinine       1.1         Differential  Method       Automated         eGFR       50.2         Eos #       0.2         Eosinophil %       1.3         Glucose       149         Gran # (ANC)       14.0         Gran %       88.9         Hematocrit       25.9         Hemoglobin       8.0         Immature Grans (Abs)       0.11  Comment: Mild elevation in immature granulocytes is non specific and   can be seen in a variety of conditions including stress response,   acute inflammation, trauma and pregnancy. Correlation with other   laboratory and clinical findings is essential.           Immature Granulocytes       0.7         Lymph #       0.8         Lymph %       5.1         MCH       26.4         MCHC       30.9         MCV       86         Mono #       0.6         Mono %       3.9         MPV       10.1         nRBC       0         Platelets       528         POCT Glucose 158   272   228     187       Potassium       4.1         PROTEIN TOTAL       7.4         RBC       3.03         RDW       15.9         Sodium       142         WBC       15.71                                Significant Imaging: I have reviewed all pertinent imaging results/findings within the past 24 hours.

## 2023-03-15 NOTE — PROGRESS NOTES
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    She underwent left external iliac to R SFA bypass with PTFE, left common + ext iliac artery stenting, R SFA angioplasty with drug coated balloon on 3/8/23. 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    She remains afebrile, with persistent leukocytosis, wound dehiscence at TMA site, cultures now show growth of pseudomonas fluorescens (3/10).  No other localizing symptoms of infection.     Recommendations    - Continue Linezolid monotherapy for SSTI coverage until 3/15/2023. Continue Meropenem IV to cover for pseudomonas fluorescens until susceptibilities result ( pseudomonas was growing while patient was and had received prolonged pip/tazo course)    -Follow up on pathology of bone margins. Appreciate podiatry recs, continue to monitor WBC count for now, if plateau or worsens then may go for irrigation of R TMA site.                 Anticipated Disposition: TBD     Thank you for your consult. ID will follow    Krunal Miranda MD  Infectious Disease  [facility] -  [facility]    Subjective:     Principal Problem:Critical limb ischemia with history of revascularization of same extremity    HPI: 82 year old female with a history of HTN, DMII, DVT, CHF, PAD, DVT and a history of prior fem-fem 20 years prior that occluded s/p R iliofemoral bypass.  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  She is admitted for worsening right foot pain, swelling and discoloration following removal of an ingrown toe nail.  Patient was diagnosed with dry gangrene of the foot.  She underwent Left external iliac to right SFA bypass with 8mm PTFE on March 2.  She then underwent transmetatarsal amputation of the right foot on March 4.  Cultures from proximal bone margins were negative but the pathology is pending.  ID is consulted to assist with her care.         Interval History: NAEON    Review of Systems  Constitutional:  Positive for chills and fatigue. Negative for activity change, appetite change and fever.   Respiratory:  Negative for cough and shortness of breath.    Gastrointestinal:  Negative for abdominal distention, blood in stool and vomiting.   Genitourinary:  Negative for dysuria, hematuria and urgency.   Skin:  Positive for rash and wound.   Objective:     Vital Signs (Most Recent):  Temp: 97.9 °F (36.6 °C) (03/14/23 1503)  Pulse: 80 (03/14/23 1539)  Resp: 16 (03/14/23 1952)  BP: 120/70 (03/14/23 1503)  SpO2: 97 % (03/14/23 1700) Vital Signs (24h Range):  Temp:  [97.9 °F (36.6 °C)-99.5 °F (37.5 °C)] 97.9 °F (36.6 °C)  Pulse:  [75-84] 80  Resp:  [16-20] 16  SpO2:  [93 %-98 %] 97 %  BP: (120-149)/(63-70) 120/70     Weight: 73.5 kg (162 lb 0.6 oz)  Body mass index is 27.81 kg/m².    Estimated Creatinine Clearance: 30.4 mL/min (based on SCr of 1.4 mg/dL).    Physical Exam  Constitutional:       Appearance: Normal appearance. She is ill-appearing. She is not  toxic-appearing.   HENT:      Head: Normocephalic and atraumatic.      Nose: Nose normal.      Mouth/Throat:      Mouth: Mucous membranes are moist.      Pharynx: Oropharynx is clear.   Eyes:      Extraocular Movements: Extraocular movements intact.      Conjunctiva/sclera: Conjunctivae normal.      Pupils: Pupils are equal, round, and reactive to light.   Cardiovascular:      Rate and Rhythm: Normal rate and regular rhythm.      Pulses: Normal pulses.      Heart sounds: Normal heart sounds.   Pulmonary:      Effort: Pulmonary effort is normal.      Breath sounds: Normal breath sounds.   Abdominal:      General: Abdomen is flat. Bowel sounds are normal.      Palpations: Abdomen is soft.   Musculoskeletal:         General: Tenderness and deformity present. No swelling.      Cervical back: Normal range of motion and neck supple.   Skin:     General: Skin is warm and dry.      Findings: Bruising and lesion present.      Comments: Right TMA with dressing c/d/I and surgical dressing in place      Neurological:      Mental Status: She is alert. Mental status is at baseline.   Psychiatric:         Behavior: Behavior normal.         Thought Content: Thought content normal.   Significant Labs:   Microbiology Results (last 7 days)       Procedure Component Value Units Date/Time    Blood culture [[id number]] Collected: 03/10/23 0942    Order Status: Completed Specimen: Blood from Antecubital, Right Arm Updated: 03/14/23 1212     Blood Culture, Routine No Growth to date      No Growth to date      No Growth to date      No Growth to date      No Growth to date    Blood culture [[id number]] Collected: 03/10/23 0943    Order Status: Completed Specimen: Blood from Peripheral, Right Hand Updated: 03/14/23 1212     Blood Culture, Routine No Growth to date      No Growth to date      No Growth to date      No Growth to date      No Growth to date    Aerobic culture [[id number]]  (Abnormal) Collected: 03/10/23 1340    Order Status:  Completed Specimen: Wound from Foot, Right Updated: 03/14/23 1032     Aerobic Bacterial Culture PSEUDOMONAS FLUORESCENS [employer]  Few  Susceptibility pending      Narrative:      Right foot tma site wound    Culture, Anaerobe [[id number]] Collected: 03/10/23 1340    Order Status: Completed Specimen: Wound from Foot, Right Updated: 03/14/23 0942     Anaerobic Culture No anaerobes isolated    Narrative:      Right foot tma site wound    Blood culture [[id number]] Collected: 03/08/23 2325    Order Status: Completed Specimen: Blood from Peripheral, Lower Arm, Right Updated: 03/14/23 0612     Blood Culture, Routine No growth after 5 days.    Narrative:      Right Peripheral    Blood culture [[id number]] Collected: 03/08/23 2325    Order Status: Completed Specimen: Blood from Peripheral, Lower Arm, Left Updated: 03/14/23 0612     Blood Culture, Routine No growth after 5 days.    Narrative:      Left Peripheral    Culture, Anaerobe [[id number]] Collected: 03/04/23 1141    Order Status: Completed Specimen: Incision site from Foot, Right Updated: 03/13/23 1009     Anaerobic Culture No anaerobes isolated    Narrative:      3rd Metatarsal    Respiratory Infection Panel (PCR), Nasopharyngeal [[id number]]     Order Status: No result Specimen: Nasopharyngeal Swab     Culture, Respiratory with Gram Stain [[id number]]     Order Status: No result Specimen: Respiratory     Gram stain [[id number]] Collected: 03/10/23 1340    Order Status: Completed Specimen: Wound from Foot, Right Updated: 03/10/23 1924     Gram Stain Result Rare WBC's      No organisms seen    Narrative:      Right foot tma site wound    Clostridium difficile EIA [[id number]]     Order Status: Canceled Specimen: Stool     Fungus culture [[id number]] Collected: 03/04/23 1141    Order Status: Completed Specimen: Incision site from Foot, Right Updated: 03/09/23 1018     Fungus (Mycology) Culture Culture in progress    Narrative:      3rd Metatarsal          All pertinent labs  within the past 24 hours have been reviewed.  Recent Lab Results         03/14/23  1506   03/14/23  1202   03/14/23  0834   03/14/23  0217        Albumin       1.9       Alkaline Phosphatase       82       ALT       20       Anion Gap       13       aPTT       33.3  Comment: aPTT therapeutic range = 39-69 seconds       AST       37       Baso #       0.02       Basophil %       0.1       BILIRUBIN TOTAL       0.7  Comment: For infants and newborns, interpretation of results should be based  on gestational age, weight and in agreement with clinical  observations.    Premature Infant recommended reference ranges:  Up to 24 hours.............<8.0 mg/dL  Up to 48 hours............<12.0 mg/dL  3-5 days..................<15.0 mg/dL  6-29 days.................<15.0 mg/dL         BUN       58       Calcium       8.8       Chloride       100       CO2       28       Creatinine       1.4       Differential Method       Automated       eGFR       37.6       Eos #       0.1       Eosinophil %       0.7       Glucose       126       Gran # (ANC)       14.8       Gran %       87.9       Hematocrit       25.0       Hemoglobin       7.9       Immature Grans (Abs)       0.16  Comment: Mild elevation in immature granulocytes is non specific and   can be seen in a variety of conditions including stress response,   acute inflammation, trauma and pregnancy. Correlation with other   laboratory and clinical findings is essential.         Immature Granulocytes       0.9       Lymph #       0.9       Lymph %       5.0       Magnesium       2.8       MCH       26.5       MCHC       31.6       MCV       84       Mono #       0.9       Mono %       5.4       MPV       10.4       nRBC       0       Phosphorus       4.4       Platelets       510       POCT Glucose 145   199   174         Potassium       4.4       PROTEIN TOTAL       7.2       RBC       2.98       RDW       15.9       Sodium       141       WBC       16.90                Significant Imaging: CT: I have reviewed all pertinent results/findings within the past 24 hours:

## 2023-03-15 NOTE — SUBJECTIVE & OBJECTIVE
Interval History: NAEON    Review of Systems  Constitutional:  Positive for chills and fatigue. Negative for activity change, appetite change and fever.   Respiratory:  Negative for cough and shortness of breath.    Gastrointestinal:  Negative for abdominal distention, blood in stool and vomiting.   Genitourinary:  Negative for dysuria, hematuria and urgency.   Skin:  Positive for rash and wound.   Objective:     Vital Signs (Most Recent):  Temp: 97.9 °F (36.6 °C) (03/14/23 1503)  Pulse: 80 (03/14/23 1539)  Resp: 16 (03/14/23 1952)  BP: 120/70 (03/14/23 1503)  SpO2: 97 % (03/14/23 1700) Vital Signs (24h Range):  Temp:  [97.9 °F (36.6 °C)-99.5 °F (37.5 °C)] 97.9 °F (36.6 °C)  Pulse:  [75-84] 80  Resp:  [16-20] 16  SpO2:  [93 %-98 %] 97 %  BP: (120-149)/(63-70) 120/70     Weight: 73.5 kg (162 lb 0.6 oz)  Body mass index is 27.81 kg/m².    Estimated Creatinine Clearance: 30.4 mL/min (based on SCr of 1.4 mg/dL).    Physical Exam  Constitutional:       Appearance: Normal appearance. She is ill-appearing. She is not toxic-appearing.   HENT:      Head: Normocephalic and atraumatic.      Nose: Nose normal.      Mouth/Throat:      Mouth: Mucous membranes are moist.      Pharynx: Oropharynx is clear.   Eyes:      Extraocular Movements: Extraocular movements intact.      Conjunctiva/sclera: Conjunctivae normal.      Pupils: Pupils are equal, round, and reactive to light.   Cardiovascular:      Rate and Rhythm: Normal rate and regular rhythm.      Pulses: Normal pulses.      Heart sounds: Normal heart sounds.   Pulmonary:      Effort: Pulmonary effort is normal.      Breath sounds: Normal breath sounds.   Abdominal:      General: Abdomen is flat. Bowel sounds are normal.      Palpations: Abdomen is soft.   Musculoskeletal:         General: Tenderness and deformity present. No swelling.      Cervical back: Normal range of motion and neck supple.   Skin:     General: Skin is warm and dry.      Findings: Bruising and lesion  present.      Comments: Right TMA with dressing c/d/I and surgical dressing in place      Neurological:      Mental Status: She is alert. Mental status is at baseline.   Psychiatric:         Behavior: Behavior normal.         Thought Content: Thought content normal.   Significant Labs:   Microbiology Results (last 7 days)       Procedure Component Value Units Date/Time    Blood culture [[id number]] Collected: 03/10/23 0942    Order Status: Completed Specimen: Blood from Antecubital, Right Arm Updated: 03/14/23 1212     Blood Culture, Routine No Growth to date      No Growth to date      No Growth to date      No Growth to date      No Growth to date    Blood culture [[id number]] Collected: 03/10/23 0943    Order Status: Completed Specimen: Blood from Peripheral, Right Hand Updated: 03/14/23 1212     Blood Culture, Routine No Growth to date      No Growth to date      No Growth to date      No Growth to date      No Growth to date    Aerobic culture [[id number]]  (Abnormal) Collected: 03/10/23 1340    Order Status: Completed Specimen: Wound from Foot, Right Updated: 03/14/23 1032     Aerobic Bacterial Culture [facility]  Few  Susceptibility pending      Narrative:      Right foot tma site wound    Culture, Anaerobe [[id number]] Collected: 03/10/23 1340    Order Status: Completed Specimen: Wound from Foot, Right Updated: 03/14/23 0942     Anaerobic Culture No anaerobes isolated    Narrative:      Right foot tma site wound    Blood culture [[id number]] Collected: 03/08/23 2325    Order Status: Completed Specimen: Blood from Peripheral, Lower Arm, Right Updated: 03/14/23 0612     Blood Culture, Routine No growth after 5 days.    Narrative:      Right Peripheral    Blood culture [[id number]] Collected: 03/08/23 2325    Order Status: Completed Specimen: Blood from Peripheral, Lower Arm, Left Updated: 03/14/23 0612     Blood Culture, Routine No growth after 5 days.    Narrative:      Left Peripheral     Culture, Anaerobe [[id number]] Collected: 03/04/23 1141    Order Status: Completed Specimen: Incision site from Foot, Right Updated: 03/13/23 1009     Anaerobic Culture No anaerobes isolated    Narrative:      3rd Metatarsal    Respiratory Infection Panel (PCR), Nasopharyngeal [[id number]]     Order Status: No result Specimen: Nasopharyngeal Swab     Culture, Respiratory with Gram Stain [[id number]]     Order Status: No result Specimen: Respiratory     Gram stain [[id number]] Collected: 03/10/23 1340    Order Status: Completed Specimen: Wound from Foot, Right Updated: 03/10/23 1924     Gram Stain Result Rare WBC's      No organisms seen    Narrative:      Right foot tma site wound    Clostridium difficile EIA [[id number]]     Order Status: Canceled Specimen: Stool     Fungus culture [[id number]] Collected: 03/04/23 1141    Order Status: Completed Specimen: Incision site from Foot, Right Updated: 03/09/23 1018     Fungus (Mycology) Culture Culture in progress    Narrative:      3rd Metatarsal          All pertinent labs within the past 24 hours have been reviewed.  Recent Lab Results         03/14/23  1506   03/14/23  1202   03/14/23  0834   03/14/23  0217        Albumin       1.9       Alkaline Phosphatase       82       ALT       20       Anion Gap       13       aPTT       33.3  Comment: aPTT therapeutic range = 39-69 seconds       AST       37       Baso #       0.02       Basophil %       0.1       BILIRUBIN TOTAL       0.7  Comment: For infants and newborns, interpretation of results should be based  on gestational age, weight and in agreement with clinical  observations.    Premature Infant recommended reference ranges:  Up to 24 hours.............<8.0 mg/dL  Up to 48 hours............<12.0 mg/dL  3-5 days..................<15.0 mg/dL  6-29 days.................<15.0 mg/dL         BUN       58       Calcium       8.8       Chloride       100       CO2       28       Creatinine       1.4       Differential Method        Automated       eGFR       37.6       Eos #       0.1       Eosinophil %       0.7       Glucose       126       Gran # (ANC)       14.8       Gran %       87.9       Hematocrit       25.0       Hemoglobin       7.9       Immature Grans (Abs)       0.16  Comment: Mild elevation in immature granulocytes is non specific and   can be seen in a variety of conditions including stress response,   acute inflammation, trauma and pregnancy. Correlation with other   laboratory and clinical findings is essential.         Immature Granulocytes       0.9       Lymph #       0.9       Lymph %       5.0       Magnesium       2.8       MCH       26.5       MCHC       31.6       MCV       84       Mono #       0.9       Mono %       5.4       MPV       10.4       nRBC       0       Phosphorus       4.4       Platelets       510       POCT Glucose 145   199   174         Potassium       4.4       PROTEIN TOTAL       7.2       RBC       2.98       RDW       15.9       Sodium       141       WBC       16.90               Significant Imaging: CT: I have reviewed all pertinent results/findings within the past 24 hours:

## 2023-03-15 NOTE — PROGRESS NOTES
[facility] - [facility]  Endocrinology  Progress Note    Admit Date: 2/27/2023     Reason for Consult: Management of T2DM, Hyperglycemia     Surgical Procedure and Date: s/p  Left external iliac to right SFA bypass with 8mm PTFE; Left common and external iliac artery angioplasty and stenting, 8mm Lifestent; Aortogram, pelvic angiogram; Right lower extremity angiogram; and Right SFA angioplasty with plain and drug coated 4mm balloon on 3/8/2023    Diabetes diagnosis year: approx 26 years ago    Home Diabetes Medications:  Glipizide 10 mg daily, Novolog-NPH 70/30 50 units BID (prior Metformin but no longer) - uncertain why both sulfonylurea and insulin    How often checking glucose at home? 1-3 x day   BG readings on regimen: 100-200  Hypoglycemia on the regimen?  Yes at times  Missed doses on regimen?  No    Diabetes Complications include:     Hyperglycemia and Diabetic dermatitis    Complicating diabetes co morbidities:   HTN; DVT; CHF    HPI: This is an 82-year-old female with a past medical history of severe PAD, hypertension, and insulin dependant type 2 diabetes, history of DVT who presents with right foot pain. Patient reports worsening right foot pain, swelling and discoloration that started after she had an ingrown toenail removed by her podiatrist.  She reports that her discoloration started since 2/7. Of note, the patient underwent an angiogram on 02/23 by vascular surgery. In the ED, she was tachycardic (112), and hypertensive.  Labs showed microcytic anemia (10.2), elevated sed rate (112), elevated CRP (56.5), elevated lactic acid (2.5).  Foot x-ray showed DJD and spurs on the calcaneus with no acute process. Vascular surgery recommended  transfer to [facility] for hybrid OR surgery 3/2/23. Now s/p  Left external iliac to right SFA bypass with 8mm PTFE; Left common and external iliac artery angioplasty and stenting, 8mm Lifestent; Aortogram, pelvic angiogram; Right lower extremity angiogram; and Right SFA angioplasty  "with plain and drug coated 4mm balloon on 3/8/2023. Endocrine consulted to manage hyperglycemia and type 2 diabetes. Primary requesting BG not exceed 180 mg/dL.      Lab Results   Component Value Date    HGBA1C 8.2 (H) 02/28/2023     On evaluation the patient confirms that she has been a diabetic for 26 years.  She is on both Glipizide and 70/30 insulin.  Managed by her PCP.  She denies any issues with hypoglycemia or symptoms concerning for this.  Family history of diabetes includes multiple sisters and brothers.        Interval HPI:   Blood sugars mostly around goal  Variable appetite observed, but generally low appetite     BP (!) 120/58 (BP Location: Right arm, Patient Position: Lying)   Pulse 80   Temp 96 °F (35.6 °C) (Oral)   Resp 18   Ht 5' 4" (1.626 m)   Wt 73.5 kg (162 lb 0.6 oz)   LMP  (LMP Unknown)   SpO2 98%   BMI 27.81 kg/m²     Labs Reviewed and Include    Recent Labs   Lab 03/15/23  0640   *   CALCIUM 9.0   ALBUMIN 1.9*   PROT 7.4      K 4.1   CO2 30*      BUN 48*   CREATININE 1.1   ALKPHOS 65   ALT 28   AST 54*   BILITOT 0.6     Lab Results   Component Value Date    WBC 15.71 (H) 03/15/2023    HGB 8.0 (L) 03/15/2023    HCT 25.9 (L) 03/15/2023    MCV 86 03/15/2023     (H) 03/15/2023     No results for input(s): TSH, FREET4 in the last 168 hours.  Lab Results   Component Value Date    HGBA1C 8.2 (H) 02/28/2023       Nutritional status:   Body mass index is 27.81 kg/m².  Lab Results   Component Value Date    ALBUMIN 1.9 (L) 03/15/2023    ALBUMIN 1.9 (L) 03/14/2023    ALBUMIN 2.0 (L) 03/13/2023     No results found for: PREALBUMIN    Estimated Creatinine Clearance: 38.7 mL/min (based on SCr of 1.1 mg/dL).    Accu-Checks  Recent Labs     03/12/23 2003 03/13/23  0856 03/13/23  1118 03/13/23  1607 03/13/23  1931 03/14/23  0834 03/14/23  1202 03/14/23  1506 03/14/23  2110 03/15/23  0900   POCTGLUCOSE 100 226* 187* 182* 136* 174* 199* 145* 187* 228*       Current Medications " and/or Treatments Impacting Glycemic Control  Immunotherapy:    Immunosuppressants       None          Steroids:   Hormones (From admission, onward)      Start     Stop Route Frequency Ordered    03/09/23 2100  melatonin tablet 6 mg         -- Oral Nightly 03/09/23 1546          Pressors:    Autonomic Drugs (From admission, onward)      None          Hyperglycemia/Diabetes Medications:   Antihyperglycemics (From admission, onward)      Start     Stop Route Frequency Ordered    03/13/23 1200  insulin detemir U-100 pen 12 Units         -- SubQ Daily 03/13/23 1057    03/13/23 1130  insulin aspart U-100 pen 4 Units         -- SubQ 3 times daily with meals 03/13/23 1058    03/12/23 0645  insulin aspart U-100 pen 0-5 Units         -- SubQ Before meals & nightly 03/11/23 9485            ASSESSMENT and PLAN    Hypoglycemia  - no episodes in the last 24 hours  - caution with insulin titration considering age, kidney function and variable appetite       Type 2 diabetes mellitus with chronic kidney disease, with long-term current use of insulin  - Blood sugars mostly around goal in the last 24 hours, Variable appetite observed, but generally low appetite   - Levemir 12 units daily  - Aspart 4 units before meals  - Low dose correction scale insulin as needed  - BG checks AC/HS and at 2 am  - Hypoglycemia protocol in place  - If blood glucose greater than 300, please ask patient not to eat food or drink anything other than water until correctional insulin has brought it back below 250  - Please notify Endocrine for any change and/or advance in diet      Critical limb ischemia with history of revascularization of same extremity  - Optimize blood glucose control to improve wound healing        Aditya Farrell MD  Endocrinology  [facility]

## 2023-03-15 NOTE — SUBJECTIVE & OBJECTIVE
"Interval HPI:   Blood sugars mostly around goal  Variable appetite observed, but generally low appetite     BP (!) 120/58 (BP Location: Right arm, Patient Position: Lying)   Pulse 80   Temp 96 °F (35.6 °C) (Oral)   Resp 18   Ht 5' 4" (1.626 m)   Wt 73.5 kg (162 lb 0.6 oz)   LMP  (LMP Unknown)   SpO2 98%   BMI 27.81 kg/m²     Labs Reviewed and Include    Recent Labs   Lab 03/15/23  0640   *   CALCIUM 9.0   ALBUMIN 1.9*   PROT 7.4      K 4.1   CO2 30*      BUN 48*   CREATININE 1.1   ALKPHOS 65   ALT 28   AST 54*   BILITOT 0.6     Lab Results   Component Value Date    WBC 15.71 (H) 03/15/2023    HGB 8.0 (L) 03/15/2023    HCT 25.9 (L) 03/15/2023    MCV 86 03/15/2023     (H) 03/15/2023     No results for input(s): TSH, FREET4 in the last 168 hours.  Lab Results   Component Value Date    HGBA1C 8.2 (H) 02/28/2023       Nutritional status:   Body mass index is 27.81 kg/m².  Lab Results   Component Value Date    ALBUMIN 1.9 (L) 03/15/2023    ALBUMIN 1.9 (L) 03/14/2023    ALBUMIN 2.0 (L) 03/13/2023     No results found for: PREALBUMIN    Estimated Creatinine Clearance: 38.7 mL/min (based on SCr of 1.1 mg/dL).    Accu-Checks  Recent Labs     03/12/23 2003 03/13/23  0856 03/13/23  1118 03/13/23  1607 03/13/23  1931 03/14/23  0834 03/14/23  1202 03/14/23  1506 03/14/23  2110 03/15/23  0900   POCTGLUCOSE 100 226* 187* 182* 136* 174* 199* 145* 187* 228*       Current Medications and/or Treatments Impacting Glycemic Control  Immunotherapy:    Immunosuppressants       None          Steroids:   Hormones (From admission, onward)      Start     Stop Route Frequency Ordered    03/09/23 2100  melatonin tablet 6 mg         -- Oral Nightly 03/09/23 1546          Pressors:    Autonomic Drugs (From admission, onward)      None          Hyperglycemia/Diabetes Medications:   Antihyperglycemics (From admission, onward)      Start     Stop Route Frequency Ordered    03/13/23 1200  insulin detemir U-100 pen 12 " Units         -- SubQ Daily 03/13/23 1057    03/13/23 1130  insulin aspart U-100 pen 4 Units         -- SubQ 3 times daily with meals 03/13/23 1058    03/12/23 0645  insulin aspart U-100 pen 0-5 Units         -- SubQ Before meals & nightly 03/11/23 4963

## 2023-03-15 NOTE — SUBJECTIVE & OBJECTIVE
Medications:  Continuous Infusions:  Scheduled Meds:   amLODIPine  10 mg Oral Daily    apixaban  5 mg Oral BID    aspirin  81 mg Oral Daily    atorvastatin  40 mg Oral QHS    clopidogreL  75 mg Oral Daily    dextromethorphan-guaiFENesin  mg  1 tablet Oral BID    DULoxetine  30 mg Oral BID    furosemide  40 mg Oral BID    insulin aspart U-100  0-5 Units Subcutaneous QID (AC & HS)    insulin aspart U-100  4 Units Subcutaneous TIDWM    insulin detemir U-100  12 Units Subcutaneous Daily    levalbuterol  1.25 mg Nebulization Q8H WA    linezolid  600 mg Oral Q12H    melatonin  6 mg Oral Nightly    meropenem (MERREM) IVPB  2 g Intravenous Q12H    methocarbamoL  500 mg Oral QID     PRN Meds:sodium chloride, sodium chloride, acetaminophen, benzonatate, dextrose 10%, dextrose 10%, dextrose 10%, dextrose 10%, dextrose, dextrose, dextrose, dextrose, glucagon (human recombinant), glucagon (human recombinant), glucagon (human recombinant), HYDROmorphone, levalbuterol, naloxone, ondansetron, prochlorperazine, simethicone, sodium chloride 0.9%, traMADoL     Objective:     Vital Signs (Most Recent):  Temp: 96 °F (35.6 °C) (03/15/23 0736)  Pulse: 74 (03/15/23 0736)  Resp: 18 (03/15/23 0736)  BP: (!) 120/58 (03/15/23 0736)  SpO2: 99 % (03/15/23 0736)   Vital Signs (24h Range):  Temp:  [96 °F (35.6 °C)-99.5 °F (37.5 °C)] 96 °F (35.6 °C)  Pulse:  [73-81] 74  Resp:  [16-30] 18  SpO2:  [93 %-99 %] 99 %  BP: (120-140)/(55-70) 120/58         Physical Exam  Vitals reviewed.   Constitutional:       Appearance: Normal appearance.   HENT:      Mouth/Throat:      Mouth: Mucous membranes are moist.   Eyes:      Pupils: Pupils are equal, round, and reactive to light.   Cardiovascular:      Rate and Rhythm: Normal rate and regular rhythm.   Pulmonary:      Effort: Pulmonary effort is normal.   Abdominal:      General: Abdomen is flat.      Palpations: Abdomen is soft.   Skin:     General: Skin is warm and dry.      Capillary Refill:  Capillary refill takes less than 2 seconds.      Comments: R thigh incision well-appearing  L groin dressing- small portion of breakdown, no drainage   R foot TMA dressed per podiatry    Neurological:      General: No focal deficit present.      Mental Status: She is alert.   Psychiatric:         Mood and Affect: Mood normal.       Significant Labs:  CBC:   Recent Labs   Lab 03/15/23  0640   WBC 15.71*   RBC 3.03*   HGB 8.0*   HCT 25.9*   *   MCV 86   MCH 26.4*   MCHC 30.9*     CMP:   Recent Labs   Lab 03/15/23  0640   *   CALCIUM 9.0   ALBUMIN 1.9*   PROT 7.4      K 4.1   CO2 30*      BUN 48*   CREATININE 1.1   ALKPHOS 65   ALT 28   AST 54*   BILITOT 0.6       Significant Diagnostics:  I have reviewed all pertinent imaging results/findings within the past 24 hours.

## 2023-03-15 NOTE — ASSESSMENT & PLAN NOTE
Nelsy Marino is a 82 y.o. lady with severe PAD    Procedures this hospital stay:  3/4: Right TMA  3/8: L external iliac to R SFA bypass, L common and external iliac stenting    Medications:  -ASA/plavix/Statin/Eliquis   -Abx: Merrem (pseudomonas to TMA)    Labs:   Stable today, WBC down trending   Albumin 1.9, boost ordered    Diet:  Diabetic    PT & OT:  Ordered & seeing patient    Nursing:  Assist with keeping groins dry  Q4 neurovascular checks, notify vascular if any changes    Wound Care:   Keep groins clean and dry, cover with clean gauze and tape  Change dressing once per shift       Disposition: SNF once stable for discharge   SW/CM Needs:  Assistance with SNF placement

## 2023-03-15 NOTE — PROGRESS NOTES
[facility] - [facility]  Vascular Surgery  Progress Note    Patient Name: Nelsy Marino  MRN: [id number]  Admission Date: 2/27/2023  Primary Care Provider: Juan F Garay    Subjective:     Interval History: Patient resting on rounds this morning, RENETTA MOBLEY.  No needs or complaints at this time.     Post-Op Info:  Procedure(s) (LRB):  AMPUTATION, FOOT, TRANSMETATARSAL (Right)   11 Days Post-Op       Medications:  Continuous Infusions:  Scheduled Meds:   amLODIPine  10 mg Oral Daily    apixaban  5 mg Oral BID    aspirin  81 mg Oral Daily    atorvastatin  40 mg Oral QHS    clopidogreL  75 mg Oral Daily    dextromethorphan-guaiFENesin  mg  1 tablet Oral BID    DULoxetine  30 mg Oral BID    furosemide  40 mg Oral BID    insulin aspart U-100  0-5 Units Subcutaneous QID (AC & HS)    insulin aspart U-100  4 Units Subcutaneous TIDWM    insulin detemir U-100  12 Units Subcutaneous Daily    levalbuterol  1.25 mg Nebulization Q8H WA    linezolid  600 mg Oral Q12H    melatonin  6 mg Oral Nightly    meropenem (MERREM) IVPB  2 g Intravenous Q12H    methocarbamoL  500 mg Oral QID     PRN Meds:sodium chloride, sodium chloride, acetaminophen, benzonatate, dextrose 10%, dextrose 10%, dextrose 10%, dextrose 10%, dextrose, dextrose, dextrose, dextrose, glucagon (human recombinant), glucagon (human recombinant), glucagon (human recombinant), HYDROmorphone, levalbuterol, naloxone, ondansetron, prochlorperazine, simethicone, sodium chloride 0.9%, traMADoL     Objective:     Vital Signs (Most Recent):  Temp: 96 °F (35.6 °C) (03/15/23 0736)  Pulse: 74 (03/15/23 0736)  Resp: 18 (03/15/23 0736)  BP: (!) 120/58 (03/15/23 0736)  SpO2: 99 % (03/15/23 0736)   Vital Signs (24h Range):  Temp:  [96 °F (35.6 °C)-99.5 °F (37.5 °C)] 96 °F (35.6 °C)  Pulse:  [73-81] 74  Resp:  [16-30] 18  SpO2:  [93 %-99 %] 99 %  BP: (120-140)/(55-70) 120/58         Physical Exam  Vitals reviewed.   Constitutional:       Appearance: Normal appearance.    HENT:      Mouth/Throat:      Mouth: Mucous membranes are moist.   Eyes:      Pupils: Pupils are equal, round, and reactive to light.   Cardiovascular:      Rate and Rhythm: Normal rate and regular rhythm.   Pulmonary:      Effort: Pulmonary effort is normal.   Abdominal:      General: Abdomen is flat.      Palpations: Abdomen is soft.   Skin:     General: Skin is warm and dry.      Capillary Refill: Capillary refill takes less than 2 seconds.      Comments: R thigh incision well-appearing  L groin dressing- small portion of breakdown, no drainage   R foot TMA dressed per podiatry    Neurological:      General: No focal deficit present.      Mental Status: She is alert.   Psychiatric:         Mood and Affect: Mood normal.       Significant Labs:  CBC:   Recent Labs   Lab 03/15/23  0640   WBC 15.71*   RBC 3.03*   HGB 8.0*   HCT 25.9*   *   MCV 86   MCH 26.4*   MCHC 30.9*     CMP:   Recent Labs   Lab 03/15/23  0640   *   CALCIUM 9.0   ALBUMIN 1.9*   PROT 7.4      K 4.1   CO2 30*      BUN 48*   CREATININE 1.1   ALKPHOS 65   ALT 28   AST 54*   BILITOT 0.6       Significant Diagnostics:  I have reviewed all pertinent imaging results/findings within the past 24 hours.    Assessment/Plan:     * Critical limb ischemia with history of revascularization of same extremity  Nelsy Marino is a 82 y.o. lady with severe PAD    Procedures this hospital stay:  3/4: Right TMA  3/8: L external iliac to R SFA bypass, L common and external iliac stenting    Medications:  -ASA/plavix/Statin/Eliquis   -Abx: Merrem/linezolid (pseudomonas to TMA)    Labs:   Stable today, WBC down trending   Albumin 1.9, boost ordered    Diet:  Diabetic    PT & OT:  Ordered & seeing patient    Nursing:  Assist with keeping groins dry  Q4 neurovascular checks, notify vascular if any changes    Wound Care:   Keep groins clean and dry, cover with clean gauze and tape  Change dressing once per shift       Disposition: SNF once stable  for discharge   SW/CM Needs:  Assistance with SNF placement                   Samantha Ledesma NP  Vascular Surgery  [facility]

## 2023-03-15 NOTE — SUBJECTIVE & OBJECTIVE
Interval History: resting comfortably, no new events    Review of Systems  Constitutional:  Positive for chills and fatigue. Negative for activity change, appetite change and fever.   Respiratory:  Negative for cough and shortness of breath.    Gastrointestinal:  Negative for abdominal distention, blood in stool and vomiting.   Genitourinary:  Negative for dysuria, hematuria and urgency.   Skin:  Positive for rash and wound.   Objective:     Vital Signs (Most Recent):  Temp: 99.8 °F (37.7 °C) (03/15/23 1612)  Pulse: 85 (03/15/23 1721)  Resp: 18 (03/15/23 1721)  BP: (!) 145/67 (03/15/23 1551)  SpO2: 97 % (03/15/23 1721)   Vital Signs (24h Range):  Temp:  [96 °F (35.6 °C)-100.3 °F (37.9 °C)] 99.8 °F (37.7 °C)  Pulse:  [73-87] 85  Resp:  [16-30] 18  SpO2:  [90 %-99 %] 97 %  BP: (120-145)/(55-67) 145/67     Weight: 73.5 kg (162 lb 0.6 oz)  Body mass index is 27.81 kg/m².    Estimated Creatinine Clearance: 38.7 mL/min (based on SCr of 1.1 mg/dL).    Physical Exam  Constitutional:       Appearance: Normal appearance. She is ill-appearing. She is not toxic-appearing.   HENT:      Head: Normocephalic and atraumatic.      Nose: Nose normal.      Mouth/Throat:      Mouth: Mucous membranes are moist.      Pharynx: Oropharynx is clear.   Eyes:      Extraocular Movements: Extraocular movements intact.      Conjunctiva/sclera: Conjunctivae normal.      Pupils: Pupils are equal, round, and reactive to light.   Cardiovascular:      Rate and Rhythm: Normal rate and regular rhythm.      Pulses: Normal pulses.      Heart sounds: Normal heart sounds.   Pulmonary:      Effort: Pulmonary effort is normal.      Breath sounds: Normal breath sounds.   Abdominal:      General: Abdomen is flat. Bowel sounds are normal.      Palpations: Abdomen is soft.   Musculoskeletal:         General: Tenderness and deformity present. No swelling.      Cervical back: Normal range of motion and neck supple.   Skin:     General: Skin is warm and dry.       Findings: Bruising and lesion present.      Comments: Right TMA with dressing c/d/I and surgical dressing in place      Neurological:      Mental Status: She is alert. Mental status is at baseline.   Psychiatric:         Behavior: Behavior normal.         Thought Content: Thought content normal.   Significant Labs: BMP:   Recent Labs   Lab 03/14/23  0217 03/15/23  0640   * 149*    142   K 4.4 4.1    102   CO2 28 30*   BUN 58* 48*   CREATININE 1.4 1.1   CALCIUM 8.8 9.0   MG 2.8*  --      CBC:   Recent Labs   Lab 03/14/23  0217 03/15/23  0640   WBC 16.90* 15.71*   HGB 7.9* 8.0*   HCT 25.0* 25.9*   * 528*     CMP:   Recent Labs   Lab 03/14/23  0217 03/15/23  0640    142   K 4.4 4.1    102   CO2 28 30*   * 149*   BUN 58* 48*   CREATININE 1.4 1.1   CALCIUM 8.8 9.0   PROT 7.2 7.4   ALBUMIN 1.9* 1.9*   BILITOT 0.7 0.6   ALKPHOS 82 65   AST 37 54*   ALT 20 28   ANIONGAP 13 10     Microbiology Results (last 7 days)       Procedure Component Value Units Date/Time    Aerobic culture [[id number]]  (Abnormal)  (Susceptibility) Collected: 03/10/23 1340    Order Status: Completed Specimen: Wound from Foot, Right Updated: 03/15/23 1426     Aerobic Bacterial Culture [facility]  Few      Narrative:      Right foot tma site wound    Blood culture [[id number]] Collected: 03/10/23 0942    Order Status: Completed Specimen: Blood from Antecubital, Right Arm Updated: 03/15/23 1212     Blood Culture, Routine No growth after 5 days.    Blood culture [[id number]] Collected: 03/10/23 0943    Order Status: Completed Specimen: Blood from Peripheral, Right Hand Updated: 03/15/23 1212     Blood Culture, Routine No growth after 5 days.    Culture, Anaerobe [[id number]] Collected: 03/10/23 1340    Order Status: Completed Specimen: Wound from Foot, Right Updated: 03/14/23 0942     Anaerobic Culture No anaerobes isolated    Narrative:      Right foot tma site wound    Blood culture  [[id number]] Collected: 03/08/23 2325    Order Status: Completed Specimen: Blood from Peripheral, Lower Arm, Right Updated: 03/14/23 0612     Blood Culture, Routine No growth after 5 days.    Narrative:      Right Peripheral    Blood culture [[id number]] Collected: 03/08/23 2325    Order Status: Completed Specimen: Blood from Peripheral, Lower Arm, Left Updated: 03/14/23 0612     Blood Culture, Routine No growth after 5 days.    Narrative:      Left Peripheral    Culture, Anaerobe [[id number]] Collected: 03/04/23 1141    Order Status: Completed Specimen: Incision site from Foot, Right Updated: 03/13/23 1009     Anaerobic Culture No anaerobes isolated    Narrative:      3rd Metatarsal    Respiratory Infection Panel (PCR), Nasopharyngeal [[id number]]     Order Status: No result Specimen: Nasopharyngeal Swab     Culture, Respiratory with Gram Stain [[id number]]     Order Status: No result Specimen: Respiratory     Gram stain [[id number]] Collected: 03/10/23 1340    Order Status: Completed Specimen: Wound from Foot, Right Updated: 03/10/23 1924     Gram Stain Result Rare WBC's      No organisms seen    Narrative:      Right foot tma site wound    Clostridium difficile EIA [[id number]]     Order Status: Canceled Specimen: Stool     Fungus culture [[id number]] Collected: 03/04/23 1141    Order Status: Completed Specimen: Incision site from Foot, Right Updated: 03/09/23 1018     Fungus (Mycology) Culture Culture in progress    Narrative:      3rd Metatarsal          Pathology Results  (Last 10 years)                 03/04/23 1141  Specimen to Pathology, Surgery Other (Podiatry) Final result    Narrative:  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  Recent Lab Results  (Last 5 results in the past 24 hours)        03/15/23  1609   03/15/23  1133   03/15/23  0900   03/15/23  0640   03/14/23  2110        Albumin       1.9         Alkaline Phosphatase       65         ALT       28         Anion Gap       10         aPTT       35.7  Comment: aPTT therapeutic range = 39-69 seconds         AST       54         Baso #       0.02         Basophil %       0.1         BILIRUBIN TOTAL       0.6  Comment: For infants and newborns, interpretation of results should be based  on gestational age, weight and in agreement with clinical  observations.    Premature Infant recommended reference ranges:  Up to 24 hours.............<8.0 mg/dL  Up to 48 hours............<12.0 mg/dL  3-5 days..................<15.0 mg/dL  6-29 days.................<15.0 mg/dL           BUN       48         Calcium       9.0         Chloride       102         CO2       30         Creatinine       1.1         Differential Method       Automated         eGFR       50.2         Eos #       0.2         Eosinophil %       1.3         Glucose       149         Gran # (ANC)       14.0         Gran %       88.9         Hematocrit       25.9         Hemoglobin       8.0         Immature Grans (Abs)       0.11  Comment: Mild elevation in immature granulocytes is non specific and   can be seen in a variety of conditions including stress response,   acute inflammation, trauma and pregnancy. Correlation with other   laboratory and clinical findings is essential.           Immature Granulocytes       0.7         Lymph #       0.8         Lymph %       5.1         MCH       26.4         MCHC       30.9         MCV       86         Mono #       0.6         Mono %       3.9         MPV       10.1         nRBC       0         Platelets       528         POCT Glucose 158   272   228     187       Potassium       4.1         PROTEIN  TOTAL       7.4         RBC       3.03         RDW       15.9         Sodium       142         WBC       15.71                                Significant Imaging: I have reviewed all pertinent imaging results/findings within the past 24 hours.

## 2023-03-15 NOTE — ASSESSMENT & PLAN NOTE
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    She underwent left external iliac to R SFA bypass with PTFE, left common + ext iliac artery stenting, R SFA angioplasty with drug coated balloon on 3/8/23. 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    She remains afebrile, with persistent leukocytosis, wound dehiscence at TMA site, cultures now show growth of pseudomonas fluorescens (3/10).  No other localizing symptoms of infection.     Recommendations    - Continue Linezolid monotherapy for SSTI coverage until 3/15/2023. Continue Meropenem IV to cover for pseudomonas fluorescens until susceptibilities result ( pseudomonas was growing while patient was and had received prolonged pip/tazo course)    -Follow up on pathology of bone margins. Appreciate podiatry recs, continue to monitor WBC count for now, if plateau or worsens then may go for irrigation of R TMA site.

## 2023-03-15 NOTE — ASSESSMENT & PLAN NOTE
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    She underwent left external iliac to R SFA bypass with PTFE, left common + ext iliac artery stenting, R SFA angioplasty with drug coated balloon on 3/8/23. 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    She remains afebrile, with improved leukocytosis, wound dehiscence at TMA site, cultures now show growth of pseudomonas fluorescens (3/10).  No other localizing symptoms of infection. Patholgy shows no evidence of acute osteomyelitis on surgical margins.     Recommendations    - Linezolid stops today, Meropenem can be transitioned to ciprofloxacin 500 mg q12 hours to finish total 14 days of therapy for pseudomonas fluorescens SSTI. EOT 3/23/23. After this point, patient can resume her cephalexin 1000 mg BID Ppx regimen for chronic suppression of prior graft infection.

## 2023-03-15 NOTE — PROGRESS NOTES
LTAC request submitted to [facility] on behalf of [facility]. Pending Ref # [id number]

## 2023-03-15 NOTE — ASSESSMENT & PLAN NOTE
- Blood sugars mostly around goal in the last 24 hours, Variable appetite observed, but generally low appetite   - Levemir 12 units daily  - Aspart 4 units before meals  - Low dose correction scale insulin as needed  - BG checks AC/HS and at 2 am  - Hypoglycemia protocol in place  - If blood glucose greater than 300, please ask patient not to eat food or drink anything other than water until correctional insulin has brought it back below 250  - Please notify Endocrine for any change and/or advance in diet

## 2023-03-16 PROBLEM — E11.628 DIABETIC FOOT INFECTION: Status: ACTIVE | Noted: 2023-03-16

## 2023-03-16 PROBLEM — L08.9 DIABETIC FOOT INFECTION: Status: ACTIVE | Noted: 2023-03-16

## 2023-03-16 PROBLEM — R11.14 BILIOUS VOMITING: Status: ACTIVE | Noted: 2023-03-16

## 2023-03-16 PROBLEM — K62.5 BRBPR (BRIGHT RED BLOOD PER RECTUM): Status: ACTIVE | Noted: 2023-03-16

## 2023-03-16 LAB
ALBUMIN SERPL BCP-MCNC: 1.8 G/DL (ref 3.5–5.2)
ALBUMIN SERPL BCP-MCNC: 1.9 G/DL (ref 3.5–5.2)
ALP SERPL-CCNC: 62 U/L (ref 55–135)
ALP SERPL-CCNC: 81 U/L (ref 55–135)
ALT SERPL W/O P-5'-P-CCNC: 27 U/L (ref 10–44)
ALT SERPL W/O P-5'-P-CCNC: 27 U/L (ref 10–44)
ANION GAP SERPL CALC-SCNC: 10 MMOL/L (ref 8–16)
ANION GAP SERPL CALC-SCNC: 9 MMOL/L (ref 8–16)
APTT BLDCRRT: 37.8 SEC (ref 21–32)
AST SERPL-CCNC: 45 U/L (ref 10–40)
AST SERPL-CCNC: 45 U/L (ref 10–40)
BACTERIA SPEC AEROBE CULT: ABNORMAL
BASOPHILS # BLD AUTO: 0.02 K/UL (ref 0–0.2)
BASOPHILS # BLD AUTO: 0.02 K/UL (ref 0–0.2)
BASOPHILS # BLD AUTO: 0.03 K/UL (ref 0–0.2)
BASOPHILS NFR BLD: 0.1 % (ref 0–1.9)
BASOPHILS NFR BLD: 0.1 % (ref 0–1.9)
BASOPHILS NFR BLD: 0.2 % (ref 0–1.9)
BILIRUB SERPL-MCNC: 0.6 MG/DL (ref 0.1–1)
BILIRUB SERPL-MCNC: 0.6 MG/DL (ref 0.1–1)
BUN SERPL-MCNC: 42 MG/DL (ref 8–23)
BUN SERPL-MCNC: 42 MG/DL (ref 8–23)
CALCIUM SERPL-MCNC: 8.9 MG/DL (ref 8.7–10.5)
CALCIUM SERPL-MCNC: 9 MG/DL (ref 8.7–10.5)
CHLORIDE SERPL-SCNC: 103 MMOL/L (ref 95–110)
CHLORIDE SERPL-SCNC: 104 MMOL/L (ref 95–110)
CO2 SERPL-SCNC: 29 MMOL/L (ref 23–29)
CO2 SERPL-SCNC: 29 MMOL/L (ref 23–29)
CREAT SERPL-MCNC: 0.9 MG/DL (ref 0.5–1.4)
CREAT SERPL-MCNC: 1 MG/DL (ref 0.5–1.4)
DIFFERENTIAL METHOD: ABNORMAL
EOSINOPHIL # BLD AUTO: 0.1 K/UL (ref 0–0.5)
EOSINOPHIL # BLD AUTO: 0.1 K/UL (ref 0–0.5)
EOSINOPHIL # BLD AUTO: 0.2 K/UL (ref 0–0.5)
EOSINOPHIL NFR BLD: 0.7 % (ref 0–8)
EOSINOPHIL NFR BLD: 1 % (ref 0–8)
EOSINOPHIL NFR BLD: 1 % (ref 0–8)
ERYTHROCYTE [DISTWIDTH] IN BLOOD BY AUTOMATED COUNT: 15.8 % (ref 11.5–14.5)
ERYTHROCYTE [DISTWIDTH] IN BLOOD BY AUTOMATED COUNT: 15.9 % (ref 11.5–14.5)
ERYTHROCYTE [DISTWIDTH] IN BLOOD BY AUTOMATED COUNT: 15.9 % (ref 11.5–14.5)
EST. GFR  (NO RACE VARIABLE): 56.2 ML/MIN/1.73 M^2
EST. GFR  (NO RACE VARIABLE): >60 ML/MIN/1.73 M^2
GLUCOSE SERPL-MCNC: 147 MG/DL (ref 70–110)
GLUCOSE SERPL-MCNC: 159 MG/DL (ref 70–110)
HCT VFR BLD AUTO: 25.1 % (ref 37–48.5)
HCT VFR BLD AUTO: 25.4 % (ref 37–48.5)
HCT VFR BLD AUTO: 26.9 % (ref 37–48.5)
HGB BLD-MCNC: 7.7 G/DL (ref 12–16)
HGB BLD-MCNC: 7.7 G/DL (ref 12–16)
HGB BLD-MCNC: 8.3 G/DL (ref 12–16)
IMM GRANULOCYTES # BLD AUTO: 0.06 K/UL (ref 0–0.04)
IMM GRANULOCYTES # BLD AUTO: 0.06 K/UL (ref 0–0.04)
IMM GRANULOCYTES # BLD AUTO: 0.09 K/UL (ref 0–0.04)
IMM GRANULOCYTES NFR BLD AUTO: 0.4 % (ref 0–0.5)
IMM GRANULOCYTES NFR BLD AUTO: 0.5 % (ref 0–0.5)
IMM GRANULOCYTES NFR BLD AUTO: 0.6 % (ref 0–0.5)
LYMPHOCYTES # BLD AUTO: 0.6 K/UL (ref 1–4.8)
LYMPHOCYTES # BLD AUTO: 0.6 K/UL (ref 1–4.8)
LYMPHOCYTES # BLD AUTO: 0.7 K/UL (ref 1–4.8)
LYMPHOCYTES NFR BLD: 4.2 % (ref 18–48)
LYMPHOCYTES NFR BLD: 4.7 % (ref 18–48)
LYMPHOCYTES NFR BLD: 5 % (ref 18–48)
MCH RBC QN AUTO: 26.1 PG (ref 27–31)
MCH RBC QN AUTO: 26.3 PG (ref 27–31)
MCH RBC QN AUTO: 26.3 PG (ref 27–31)
MCHC RBC AUTO-ENTMCNC: 30.3 G/DL (ref 32–36)
MCHC RBC AUTO-ENTMCNC: 30.7 G/DL (ref 32–36)
MCHC RBC AUTO-ENTMCNC: 30.9 G/DL (ref 32–36)
MCV RBC AUTO: 85 FL (ref 82–98)
MCV RBC AUTO: 86 FL (ref 82–98)
MCV RBC AUTO: 86 FL (ref 82–98)
MONOCYTES # BLD AUTO: 0.4 K/UL (ref 0.3–1)
MONOCYTES # BLD AUTO: 0.5 K/UL (ref 0.3–1)
MONOCYTES # BLD AUTO: 0.6 K/UL (ref 0.3–1)
MONOCYTES NFR BLD: 3.2 % (ref 4–15)
MONOCYTES NFR BLD: 3.8 % (ref 4–15)
MONOCYTES NFR BLD: 4.3 % (ref 4–15)
NEUTROPHILS # BLD AUTO: 11.2 K/UL (ref 1.8–7.7)
NEUTROPHILS # BLD AUTO: 12.3 K/UL (ref 1.8–7.7)
NEUTROPHILS # BLD AUTO: 12.8 K/UL (ref 1.8–7.7)
NEUTROPHILS NFR BLD: 89 % (ref 38–73)
NEUTROPHILS NFR BLD: 89.8 % (ref 38–73)
NEUTROPHILS NFR BLD: 91.4 % (ref 38–73)
NRBC BLD-RTO: 0 /100 WBC
PLATELET # BLD AUTO: 485 K/UL (ref 150–450)
PLATELET # BLD AUTO: 516 K/UL (ref 150–450)
PLATELET # BLD AUTO: 516 K/UL (ref 150–450)
PMV BLD AUTO: 10 FL (ref 9.2–12.9)
PMV BLD AUTO: 9.7 FL (ref 9.2–12.9)
PMV BLD AUTO: 9.9 FL (ref 9.2–12.9)
POCT GLUCOSE: 140 MG/DL (ref 70–110)
POCT GLUCOSE: 154 MG/DL (ref 70–110)
POCT GLUCOSE: 169 MG/DL (ref 70–110)
POTASSIUM SERPL-SCNC: 4.2 MMOL/L (ref 3.5–5.1)
POTASSIUM SERPL-SCNC: 4.6 MMOL/L (ref 3.5–5.1)
PROT SERPL-MCNC: 7.1 G/DL (ref 6–8.4)
PROT SERPL-MCNC: 7.3 G/DL (ref 6–8.4)
RBC # BLD AUTO: 2.93 M/UL (ref 4–5.4)
RBC # BLD AUTO: 2.95 M/UL (ref 4–5.4)
RBC # BLD AUTO: 3.16 M/UL (ref 4–5.4)
SODIUM SERPL-SCNC: 142 MMOL/L (ref 136–145)
SODIUM SERPL-SCNC: 142 MMOL/L (ref 136–145)
WBC # BLD AUTO: 12.49 K/UL (ref 3.9–12.7)
WBC # BLD AUTO: 13.48 K/UL (ref 3.9–12.7)
WBC # BLD AUTO: 14.39 K/UL (ref 3.9–12.7)

## 2023-03-16 PROCEDURE — 99900035 HC TECH TIME PER 15 MIN (STAT)

## 2023-03-16 PROCEDURE — 99232 SBSQ HOSP IP/OBS MODERATE 35: CPT | Mod: ,,, | Performed by: INTERNAL MEDICINE

## 2023-03-16 PROCEDURE — 25000242 PHARM REV CODE 250 ALT 637 W/ HCPCS: Performed by: INTERNAL MEDICINE

## 2023-03-16 PROCEDURE — 25000003 PHARM REV CODE 250: Performed by: STUDENT IN AN ORGANIZED HEALTH CARE EDUCATION/TRAINING PROGRAM

## 2023-03-16 PROCEDURE — 99232 PR SUBSEQUENT HOSPITAL CARE,LEVL II: ICD-10-PCS | Mod: ,,, | Performed by: INTERNAL MEDICINE

## 2023-03-16 PROCEDURE — 36415 COLL VENOUS BLD VENIPUNCTURE: CPT | Performed by: STUDENT IN AN ORGANIZED HEALTH CARE EDUCATION/TRAINING PROGRAM

## 2023-03-16 PROCEDURE — 80053 COMPREHEN METABOLIC PANEL: CPT | Performed by: STUDENT IN AN ORGANIZED HEALTH CARE EDUCATION/TRAINING PROGRAM

## 2023-03-16 PROCEDURE — 63600175 PHARM REV CODE 636 W HCPCS: Performed by: STUDENT IN AN ORGANIZED HEALTH CARE EDUCATION/TRAINING PROGRAM

## 2023-03-16 PROCEDURE — 99233 SBSQ HOSP IP/OBS HIGH 50: CPT | Mod: 24,,, | Performed by: PODIATRIST

## 2023-03-16 PROCEDURE — 20600001 HC STEP DOWN PRIVATE ROOM

## 2023-03-16 PROCEDURE — 85025 COMPLETE CBC W/AUTO DIFF WBC: CPT | Mod: 91 | Performed by: INTERNAL MEDICINE

## 2023-03-16 PROCEDURE — 99233 PR SUBSEQUENT HOSPITAL CARE,LEVL III: ICD-10-PCS | Mod: 24,,, | Performed by: PODIATRIST

## 2023-03-16 PROCEDURE — 94668 MNPJ CHEST WALL SBSQ: CPT

## 2023-03-16 PROCEDURE — 25000003 PHARM REV CODE 250: Performed by: INTERNAL MEDICINE

## 2023-03-16 PROCEDURE — 99222 1ST HOSP IP/OBS MODERATE 55: CPT | Mod: GC,,, | Performed by: STUDENT IN AN ORGANIZED HEALTH CARE EDUCATION/TRAINING PROGRAM

## 2023-03-16 PROCEDURE — 99222 PR INITIAL HOSPITAL CARE,LEVL II: ICD-10-PCS | Mod: GC,,, | Performed by: STUDENT IN AN ORGANIZED HEALTH CARE EDUCATION/TRAINING PROGRAM

## 2023-03-16 PROCEDURE — 93922 UPR/L XTREMITY ART 2 LEVELS: CPT | Performed by: SURGERY

## 2023-03-16 PROCEDURE — 97110 THERAPEUTIC EXERCISES: CPT

## 2023-03-16 PROCEDURE — 27000221 HC OXYGEN, UP TO 24 HOURS

## 2023-03-16 PROCEDURE — 36415 COLL VENOUS BLD VENIPUNCTURE: CPT | Performed by: INTERNAL MEDICINE

## 2023-03-16 PROCEDURE — 94664 DEMO&/EVAL PT USE INHALER: CPT

## 2023-03-16 PROCEDURE — 94640 AIRWAY INHALATION TREATMENT: CPT

## 2023-03-16 PROCEDURE — 85025 COMPLETE CBC W/AUTO DIFF WBC: CPT | Mod: 91 | Performed by: STUDENT IN AN ORGANIZED HEALTH CARE EDUCATION/TRAINING PROGRAM

## 2023-03-16 PROCEDURE — 85025 COMPLETE CBC W/AUTO DIFF WBC: CPT | Performed by: STUDENT IN AN ORGANIZED HEALTH CARE EDUCATION/TRAINING PROGRAM

## 2023-03-16 PROCEDURE — 94761 N-INVAS EAR/PLS OXIMETRY MLT: CPT

## 2023-03-16 PROCEDURE — 80053 COMPREHEN METABOLIC PANEL: CPT | Mod: 91 | Performed by: STUDENT IN AN ORGANIZED HEALTH CARE EDUCATION/TRAINING PROGRAM

## 2023-03-16 PROCEDURE — 94799 UNLISTED PULMONARY SVC/PX: CPT

## 2023-03-16 PROCEDURE — 85730 THROMBOPLASTIN TIME PARTIAL: CPT | Performed by: STUDENT IN AN ORGANIZED HEALTH CARE EDUCATION/TRAINING PROGRAM

## 2023-03-16 PROCEDURE — 97530 THERAPEUTIC ACTIVITIES: CPT

## 2023-03-16 RX ORDER — ASPIRIN 81 MG/1
81 TABLET ORAL DAILY
Status: DISCONTINUED | OUTPATIENT
Start: 2023-03-16 | End: 2023-03-17

## 2023-03-16 RX ORDER — CEPHALEXIN 500 MG/1
1000 CAPSULE ORAL EVERY 12 HOURS
Status: DISCONTINUED | OUTPATIENT
Start: 2023-03-24 | End: 2023-03-20 | Stop reason: HOSPADM

## 2023-03-16 RX ORDER — ASPIRIN 81 MG/1
81 TABLET ORAL DAILY
Status: DISCONTINUED | OUTPATIENT
Start: 2023-03-16 | End: 2023-03-16

## 2023-03-16 RX ORDER — CLOPIDOGREL BISULFATE 75 MG/1
75 TABLET ORAL DAILY
Status: DISCONTINUED | OUTPATIENT
Start: 2023-03-16 | End: 2023-03-16

## 2023-03-16 RX ORDER — CIPROFLOXACIN 500 MG/1
500 TABLET ORAL EVERY 12 HOURS
Status: DISCONTINUED | OUTPATIENT
Start: 2023-03-16 | End: 2023-03-20 | Stop reason: HOSPADM

## 2023-03-16 RX ORDER — CLOPIDOGREL BISULFATE 75 MG/1
75 TABLET ORAL DAILY
Status: DISCONTINUED | OUTPATIENT
Start: 2023-03-16 | End: 2023-03-17

## 2023-03-16 RX ADMIN — CIPROFLOXACIN 500 MG: 500 TABLET, FILM COATED ORAL at 09:03

## 2023-03-16 RX ADMIN — ONDANSETRON 4 MG: 2 INJECTION INTRAMUSCULAR; INTRAVENOUS at 12:03

## 2023-03-16 RX ADMIN — INSULIN ASPART 4 UNITS: 100 INJECTION, SOLUTION INTRAVENOUS; SUBCUTANEOUS at 11:03

## 2023-03-16 RX ADMIN — INSULIN DETEMIR 12 UNITS: 100 INJECTION, SOLUTION SUBCUTANEOUS at 09:03

## 2023-03-16 RX ADMIN — ATORVASTATIN CALCIUM 40 MG: 40 TABLET, FILM COATED ORAL at 09:03

## 2023-03-16 RX ADMIN — METHOCARBAMOL 500 MG: 500 TABLET ORAL at 12:03

## 2023-03-16 RX ADMIN — ASPIRIN 81 MG: 81 TABLET, COATED ORAL at 11:03

## 2023-03-16 RX ADMIN — METHOCARBAMOL 500 MG: 500 TABLET ORAL at 09:03

## 2023-03-16 RX ADMIN — INSULIN ASPART 4 UNITS: 100 INJECTION, SOLUTION INTRAVENOUS; SUBCUTANEOUS at 09:03

## 2023-03-16 RX ADMIN — GUAIFENESIN AND DEXTROMETHORPHAN HYDROBROMIDE 1 TABLET: 600; 30 TABLET, EXTENDED RELEASE ORAL at 09:03

## 2023-03-16 RX ADMIN — APIXABAN 5 MG: 5 TABLET, FILM COATED ORAL at 09:03

## 2023-03-16 RX ADMIN — LEVALBUTEROL 1.25 MG: 1.25 SOLUTION, CONCENTRATE RESPIRATORY (INHALATION) at 04:03

## 2023-03-16 RX ADMIN — Medication 6 MG: at 09:03

## 2023-03-16 RX ADMIN — FUROSEMIDE 40 MG: 40 TABLET ORAL at 09:03

## 2023-03-16 RX ADMIN — METHOCARBAMOL 500 MG: 500 TABLET ORAL at 05:03

## 2023-03-16 RX ADMIN — APIXABAN 5 MG: 5 TABLET, FILM COATED ORAL at 11:03

## 2023-03-16 RX ADMIN — DULOXETINE 30 MG: 30 CAPSULE, DELAYED RELEASE ORAL at 09:03

## 2023-03-16 RX ADMIN — INSULIN ASPART 4 UNITS: 100 INJECTION, SOLUTION INTRAVENOUS; SUBCUTANEOUS at 03:03

## 2023-03-16 RX ADMIN — FUROSEMIDE 40 MG: 40 TABLET ORAL at 05:03

## 2023-03-16 RX ADMIN — CLOPIDOGREL BISULFATE 75 MG: 75 TABLET ORAL at 11:03

## 2023-03-16 RX ADMIN — MEROPENEM 2 G: 1 INJECTION INTRAVENOUS at 09:03

## 2023-03-16 RX ADMIN — AMLODIPINE BESYLATE 10 MG: 10 TABLET ORAL at 09:03

## 2023-03-16 NOTE — SUBJECTIVE & OBJECTIVE
Subjective:     Interval History: Vitals stable. No new pedal complaints. Dressings clean dry, intact. WBCs still downtrending, ID signed off.       Follow-up For: Procedure(s) (LRB):  AMPUTATION, FOOT, TRANSMETATARSAL (Right)    Post-Operative Day: 12 Days Post-Op    Scheduled Meds:   amLODIPine  10 mg Oral Daily    apixaban  5 mg Oral BID    aspirin  81 mg Oral Daily    atorvastatin  40 mg Oral QHS    [START ON 3/24/2023] cephALEXin  1,000 mg Oral Q12H    ciprofloxacin HCl  500 mg Oral Q12H    clopidogreL  75 mg Oral Daily    dextromethorphan-guaiFENesin  mg  1 tablet Oral BID    DULoxetine  30 mg Oral BID    furosemide  40 mg Oral BID    insulin aspart U-100  0-5 Units Subcutaneous QID (AC & HS)    insulin aspart U-100  4 Units Subcutaneous TIDWM    insulin detemir U-100  12 Units Subcutaneous Daily    levalbuterol  1.25 mg Nebulization Q8H WA    melatonin  6 mg Oral Nightly    methocarbamoL  500 mg Oral QID     Continuous Infusions:  PRN Meds:sodium chloride, sodium chloride, acetaminophen, benzonatate, dextrose 10%, dextrose 10%, dextrose 10%, dextrose 10%, dextrose, dextrose, dextrose, dextrose, glucagon (human recombinant), glucagon (human recombinant), glucagon (human recombinant), HYDROmorphone, levalbuterol, naloxone, ondansetron, prochlorperazine, simethicone, sodium chloride 0.9%, traMADoL    Review of Systems   Constitutional:  Negative for chills and fever.   HENT:  Negative for hearing loss and trouble swallowing.    Eyes:  Negative for discharge and visual disturbance.   Respiratory:  Negative for chest tightness and shortness of breath.    Cardiovascular:  Negative for chest pain and palpitations.   Gastrointestinal:  Negative for abdominal distention, abdominal pain, nausea and vomiting.   Genitourinary:  Negative for difficulty urinating and hematuria.   Musculoskeletal:  Negative for arthralgias and back pain.   Skin:  Positive for color change and wound. Negative for rash.    Neurological:  Negative for dizziness and headaches.   Objective:     Vital Signs (Most Recent):  Temp: 99.9 °F (37.7 °C) (03/16/23 0755)  Pulse: 84 (03/16/23 1516)  Resp: 16 (03/16/23 0755)  BP: 125/63 (03/16/23 0755)  SpO2: 95 % (03/16/23 0755) Vital Signs (24h Range):  Temp:  [98 °F (36.7 °C)-100.3 °F (37.9 °C)] 99.9 °F (37.7 °C)  Pulse:  [71-89] 84  Resp:  [16-18] 16  SpO2:  [90 %-99 %] 95 %  BP: (125-146)/(60-69) 125/63     Weight: 73.5 kg (162 lb 0.6 oz)  Body mass index is 27.81 kg/m².    Foot Exam    General  Orientation: alert and oriented to person, place, and time   Affect: appropriate       Right Foot/Ankle     Inspection and Palpation  Tenderness: (TMA site, mild)  Swelling: (TMA site, mild)  Skin Exam: drainage, maceration, abnormal color and ulcer (incision);     Neurovascular  Dorsalis pedis: doppler  Posterior tibial: doppler  Saphenous nerve sensation: diminished  Tibial nerve sensation: diminished  Superficial peroneal nerve sensation: diminished  Deep peroneal nerve sensation: diminished  Sural nerve sensation: diminished    Comments  S/p right tma. Sutures are intact, 2 sutures previously removed at site of drainage. Scant serosanguinous drainage from central area of incision, with periwound maceration. Jodie-incision/wound skin mildly necrotic. Moderate tenderness throughout surgical site.     Left Foot/Ankle      Inspection and Palpation  Tenderness: none   Swelling: none   Skin Exam: skin intact;     Neurovascular  Dorsalis pedis: absent  Posterior tibial: doppler  Saphenous nerve sensation: diminished  Tibial nerve sensation: diminished  Superficial peroneal nerve sensation: diminished  Deep peroneal nerve sensation: diminished  Sural nerve sensation: diminished    Comments  No open wounds noted          Laboratory:  Blood Cultures: No results for input(s): LABBLOO in the last 48 hours.  CBC:   Recent Labs   Lab 03/16/23  1354   WBC 14.39*   RBC 2.93*   HGB 7.7*   HCT 25.1*   *   MCV  86   MCH 26.3*   MCHC 30.7*     CMP:   Recent Labs   Lab 03/16/23  0222   *   CALCIUM 9.0   ALBUMIN 1.9*   PROT 7.1      K 4.6   CO2 29      BUN 42*   CREATININE 1.0   ALKPHOS 81   ALT 27   AST 45*   BILITOT 0.6     CRP:   Recent Labs   Lab 03/10/23  0942   .0*     ESR:   Recent Labs   Lab 03/10/23  0942   SEDRATE 64*     Microbiology Results (last 7 days)       Procedure Component Value Units Date/Time    Aerobic culture [[id number]]  (Abnormal)  (Susceptibility) Collected: 03/10/23 1340    Order Status: Completed Specimen: Wound from Foot, Right Updated: 03/16/23 1452     Aerobic Bacterial Culture [facility] [employer] [facility]  Few      Narrative:      Right foot tma site wound    Stool culture [[id number]]     Order Status: No result Specimen: Stool     Blood culture [[id number]] Collected: 03/10/23 0942    Order Status: Completed Specimen: Blood from Antecubital, Right Arm Updated: 03/15/23 1212     Blood Culture, Routine No growth after 5 days.    Blood culture [[id number]] Collected: 03/10/23 0943    Order Status: Completed Specimen: Blood from Peripheral, Right Hand Updated: 03/15/23 1212     Blood Culture, Routine No growth after 5 days.    Culture, Anaerobe [[id number]] Collected: 03/10/23 1340    Order Status: Completed Specimen: Wound from Foot, Right Updated: 03/14/23 0942     Anaerobic Culture No anaerobes isolated    Narrative:      Right foot tma site wound    Blood culture [[id number]] Collected: 03/08/23 2325    Order Status: Completed Specimen: Blood from Peripheral, Lower Arm, Right Updated: 03/14/23 0612     Blood Culture, Routine No growth after 5 days.    Narrative:      Right Peripheral    Blood culture [[id number]] Collected: 03/08/23 2325    Order Status: Completed Specimen: Blood from Peripheral, Lower Arm, Left Updated: 03/14/23 0612     Blood Culture, Routine No growth after 5 days.    Narrative:      Left Peripheral    Culture, Anaerobe [[id number]] Collected: 03/04/23  1141    Order Status: Completed Specimen: Incision site from Foot, Right Updated: 03/13/23 1009     Anaerobic Culture No anaerobes isolated    Narrative:      3rd Metatarsal    Respiratory Infection Panel (PCR), Nasopharyngeal [[id number]]     Order Status: No result Specimen: Nasopharyngeal Swab     Culture, Respiratory with Gram Stain [[id number]]     Order Status: No result Specimen: Respiratory     Gram stain [[id number]] Collected: 03/10/23 1340    Order Status: Completed Specimen: Wound from Foot, Right Updated: 03/10/23 1924     Gram Stain Result Rare WBC's      No organisms seen    Narrative:      Right foot tma site wound    Clostridium difficile EIA [[id number]]     Order Status: Canceled Specimen: Stool           Specimen (24h ago, onward)      None

## 2023-03-16 NOTE — ASSESSMENT & PLAN NOTE
Patient endorsed new 1x episode of nausea with associated emesis overnight. She currently denies discomfort or tenderness. She does have a hx of GERD however states it is not daily, or frequent, and medication resolves symptoms. Described as bilious emesis with streaks/specks of blood seen mixed in. This is new for her. She denies having had EGD done for any reason in the past.   - Hgb currently stable without tenderness, or symptom reoccurrence.   - Clear liquids for now.   - Will f/u with patient regarding sxs reoccurrence  - No current indicated for EGD at this time

## 2023-03-16 NOTE — HPI
"Ms. Marino is an 82 year old female with past medical hx of HTN, T2DM< DVT, and severe PAD who presented with right foot pain. She is currently now s/p tma and SFA artery bypass. Currently on ASA, Plavix, and Eliquis. GI consulted for episode of bilious emesis with streaks of blood as well as diarrhea that was black in appearence. Hgb 7.7 with baseline roughly around 7-9. She did endorse a "mild" case of nausea overnight prior to the emesis lepidote. She denies, abdominal tenderness/discomfort, chest pain, SOB, and is overall not in acute distress at time of eval. She states the "bloody bm" felt like a regular diarrhea episode. Triple anticoagulation therapy stopped overnight due to concerns of bleeding. Patient made NPO.   "

## 2023-03-16 NOTE — SUBJECTIVE & OBJECTIVE
Interval History: Patient lying in bed, no acute distress. Family at bedside. Continues to report BLE pain. Resumed home Eliquis since H/H remains stable. If patient has drop in H/H or signs of active bleeding, will consult GI. Continue aspirin and plavix for now. Per ID, will transition to oral abx upon discharge to SNF.          Intake/Output Summary (Last 24 hours) at 3/16/2023 0954  Last data filed at 3/16/2023 0415  Gross per 24 hour   Intake --   Output 1600 ml   Net -1600 ml           Review of Systems   Constitutional:  Positive for activity change and fatigue. Negative for chills and fever.   HENT:  Negative for congestion.    Eyes:  Negative for visual disturbance.   Respiratory:  Positive for cough and shortness of breath.    Cardiovascular:  Positive for leg swelling. Negative for chest pain.   Gastrointestinal:  Negative for abdominal distention, abdominal pain, nausea and vomiting.   Genitourinary:  Negative for dysuria.   Musculoskeletal:  Positive for arthralgias (right foot) and myalgias (right foot). Negative for back pain.   Skin:  Negative for rash and wound.   Neurological:  Positive for weakness. Negative for dizziness.   Psychiatric/Behavioral:  Negative for confusion.      Objective:     Vital Signs (Most Recent):  Temp: 99.9 °F (37.7 °C) (03/16/23 0755)  Pulse: 83 (03/16/23 0755)  Resp: 16 (03/16/23 0755)  BP: 125/63 (03/16/23 0755)  SpO2: 95 % (03/16/23 0755)   Vital Signs (24h Range):  Temp:  [98 °F (36.7 °C)-100.3 °F (37.9 °C)] 99.9 °F (37.7 °C)  Pulse:  [71-87] 83  Resp:  [16-18] 16  SpO2:  [90 %-99 %] 95 %  BP: (125-146)/(60-69) 125/63     Weight: 73.5 kg (162 lb 0.6 oz)  Body mass index is 27.81 kg/m².    Intake/Output Summary (Last 24 hours) at 3/16/2023 0943  Last data filed at 3/16/2023 0415  Gross per 24 hour   Intake --   Output 1600 ml   Net -1600 ml        Physical Exam  Constitutional:       Appearance: Normal appearance.   HENT:      Head: Normocephalic.      Mouth/Throat:       Mouth: Mucous membranes are moist.   Eyes:      Extraocular Movements: Extraocular movements intact.      Pupils: Pupils are equal, round, and reactive to light.   Neck:      Comments: JVD absent  Cardiovascular:      Rate and Rhythm: Normal rate and regular rhythm.      Pulses: Normal pulses.      Heart sounds: Normal heart sounds.   Pulmonary:      Effort: Respiratory distress present.      Breath sounds: Normal breath sounds.   Abdominal:      General: Bowel sounds are normal. There is no distension.      Palpations: Abdomen is soft.      Tenderness: There is no abdominal tenderness.   Musculoskeletal:         General: Normal range of motion.      Cervical back: Neck supple.      Right lower leg: Edema present.      Left lower leg: Edema present.      Comments: Right TMA with dressing c/d/I and surgical shoe in place   Neurological:      General: No focal deficit present.      Mental Status: She is alert.   Psychiatric:         Mood and Affect: Mood normal.         Cognition and Memory: Cognition is impaired. Memory is impaired.       Significant Labs: A1C:   Recent Labs   Lab 02/28/23  0549   HGBA1C 8.2*       Blood Culture:   No results for input(s): LABBLOO in the last 48 hours.    CBC:   Recent Labs   Lab 03/15/23  0640 03/16/23  0045 03/16/23 0222   WBC 15.71* 12.49 13.48*   HGB 8.0* 8.3* 7.7*   HCT 25.9* 26.9* 25.4*   * 516* 516*       CMP:   Recent Labs   Lab 03/15/23  0640 03/16/23 0045 03/16/23 0222    142 142   K 4.1 4.2 4.6    104 103   CO2 30* 29 29   * 159* 147*   BUN 48* 42* 42*   CREATININE 1.1 0.9 1.0   CALCIUM 9.0 8.9 9.0   PROT 7.4 7.3 7.1   ALBUMIN 1.9* 1.8* 1.9*   BILITOT 0.6 0.6 0.6   ALKPHOS 65 62 81   AST 54* 45* 45*   ALT 28 27 27   ANIONGAP 10 9 10       Coagulation:   Recent Labs   Lab 03/16/23 0222   APTT 37.8*       Lactic Acid:   No results for input(s): LACTATE in the last 48 hours.    Magnesium:   No results for input(s): MG in the last 48  hours.    POCT Glucose:   Recent Labs   Lab 03/15/23  1133 03/15/23  1609 03/15/23  2100   POCTGLUCOSE 272* 158* 123*           Significant Imaging: I have reviewed all pertinent imaging results/findings within the past 24 hours.

## 2023-03-16 NOTE — ASSESSMENT & PLAN NOTE
Initial consult for concerns of BRBPR overnight. Patient denies abdominal discomfort.distnesion, or changes in bowel function. States it feels like a regular diarrhea type bowel movement. Patient gets colonoscopy regularly with last being 5 years prior. A polyp was found and was told to come back in three months with repeat being non-contributory.  at bedside states it may be around time for repeat colonoscopy.   - KAITLIN negative for bright red blood, notable for dark green stool, likely bilious   - No sign of obvious visual trauma when examining the rectal area   - Hgb stable, no indication for scope at this time   - Recommend clear liquid diet for now   - Defer restarting anticoagulation/anti-platelet to vascular team. Currently no indication from a GI standpoint to stop therapy.   - Case discussed with fellow and staff Dr. Rodriguez, GI will follow-up patient

## 2023-03-16 NOTE — SUBJECTIVE & OBJECTIVE
Past Medical History:   Diagnosis Date    CHF (congestive heart failure)     Diabetes mellitus     DVT (deep venous thrombosis)     Hypertension     Miscarriage        Past Surgical History:   Procedure Laterality Date     femoral vascular surgery       ANGIOGRAPHY OF LOWER EXTREMITY Right 2/23/2023    Procedure: Angiogram Extremity Unilateral;  Surgeon: Danny Dunbar MD;  Location: [facility];  Service: Vascular;  Laterality: Right;  RN PHONE PREOP 2/20/2023----CK CONSENT    CREATION OF FEMORAL-FEMORAL ARTERY BYPASS WITH GRAFT Bilateral 3/2/2023    Procedure: CREATION, BYPASS, ARTERIAL, FEMORAL TO FEMORAL, USING GRAFT, LEFT FEMORAL ENDARTERECTOMY, LEFT ILIAC ARTERY STENT PLACEMENT, RIGHT LOWER EXTREMITY ANGIOGRAM AND RIGHT SFA  ANGIOPLASTY;  Surgeon: Danny Dunbar MD;  Location: [facility] OR [address] [place] [address];  Service: Vascular;  Laterality: Bilateral;   744.47 MGY  175.36 Gycm  Flouro time 20.5 mins      CREATION OF ILIOFEMORAL ARTERY BYPASS Right 3/20/2019    Procedure: CREATION, BYPASS, ARTERIAL, ILIAC TO FEMORAL, RIGHT LOWER EXTREMITY, RIGHT PROFUNDAPLASTY;  Surgeon: Danny Dunbar MD;  Location: [facility];  Service: Vascular;  Laterality: Right;  11:00AM START PER ANNE  RN PREOP 3/13/2019  ACT'S, CELL SAVER, GRAFTS, BOOK WATER---NEED H/P  CONSENT IN AM------HGBA1C    FOOT AMPUTATION THROUGH METATARSAL Right 3/4/2023    Procedure: AMPUTATION, FOOT, TRANSMETATARSAL;  Surgeon: Benjamin Godfrey DPM;  Location: [facility] OR [address] [place] [address];  Service: Podiatry;  Laterality: Right;    HYSTERECTOMY      ?unsure cervix present    INCISION AND DRAINAGE Right 4/12/2019    Procedure: Incision and Drainage - R groin washout, possible muscle flap;  Surgeon: Danny Dunbar MD;  Location: [facility] OR [address] [place] [address];  Service: Vascular;  Laterality: Right;  groin washout    INCISION AND DRAINAGE OF GROIN Right 5/3/2019    Procedure: Incision and Drainage R groin;  Surgeon: Danny Dunbar MD;  Location: [facility] OR [address] [place] [address];  Service:  Vascular;  Laterality: Right;       Review of patient's allergies indicates:   Allergen Reactions    Motrin [ibuprofen] Itching     Family History       Problem Relation (Age of Onset)    Diabetes Father    Hypertension Father          Tobacco Use    Smoking status: Former     Types: Cigarettes     Quit date:      Years since quittin.2    Smokeless tobacco: Never   Substance and Sexual Activity    Alcohol use: No    Drug use: No    Sexual activity: Never     Review of Systems   Constitutional:  Positive for fatigue. Negative for chills, diaphoresis and fever.   Respiratory:  Negative for cough and shortness of breath.    Cardiovascular:  Positive for leg swelling.   Gastrointestinal:  Positive for blood in stool, diarrhea, nausea and vomiting. Negative for abdominal distention, abdominal pain, anal bleeding, constipation and rectal pain.   Musculoskeletal:  Positive for arthralgias and myalgias.   Skin:  Positive for color change and wound.   Neurological:  Negative for dizziness, speech difficulty, weakness and headaches.   Psychiatric/Behavioral:  Negative for agitation, behavioral problems, confusion and decreased concentration.    Objective:     Vital Signs (Most Recent):  Temp: 99.9 °F (37.7 °C) (23 0755)  Pulse: 83 (23 0755)  Resp: 16 (23 0755)  BP: 125/63 (23 0755)  SpO2: 95 % (23 0755) Vital Signs (24h Range):  Temp:  [98 °F (36.7 °C)-100.3 °F (37.9 °C)] 99.9 °F (37.7 °C)  Pulse:  [71-87] 83  Resp:  [16-18] 16  SpO2:  [90 %-99 %] 95 %  BP: (125-146)/(60-69) 125/63     Weight: 73.5 kg (162 lb 0.6 oz) (03/10/23 0600)  Body mass index is 27.81 kg/m².      Intake/Output Summary (Last 24 hours) at 3/16/2023 0916  Last data filed at 3/16/2023 0415  Gross per 24 hour   Intake --   Output 1600 ml   Net -1600 ml       Lines/Drains/Airways       Drain  Duration             Female External Urinary Catheter 23 0132 11 days              Peripheral Intravenous Line  Duration                   Peripheral IV - Single Lumen 03/14/23 0203 22 G Right Antecubital 2 days                    Physical Exam  Constitutional:       General: She is awake. She is not in acute distress.     Appearance: She is not toxic-appearing or diaphoretic.   HENT:      Head: Normocephalic and atraumatic.   Cardiovascular:      Rate and Rhythm: Normal rate and regular rhythm.      Pulses: Normal pulses.      Heart sounds: Murmur heard.   Pulmonary:      Effort: Pulmonary effort is normal. No respiratory distress.      Breath sounds: Normal breath sounds. No wheezing.   Abdominal:      General: Abdomen is flat. There is no distension.      Palpations: Abdomen is soft. There is no mass.      Tenderness: There is no abdominal tenderness. There is no guarding.   Neurological:      General: No focal deficit present.      Mental Status: She is alert, oriented to person, place, and time and easily aroused.   Psychiatric:         Mood and Affect: Mood normal.         Behavior: Behavior normal. Behavior is cooperative.       Significant Labs:  All pertinent lab results from the last 24 hours have been reviewed.    Significant Imaging:  Imaging results within the past 24 hours have been reviewed.

## 2023-03-16 NOTE — PHYSICIAN QUERY
PT Name: Nelsy Marino  MR #: [id number]    DOCUMENTATION CLARIFICATION      CDS/: Jayne Tai RN              Contact information:[email or web address]    This form is a permanent document in the medical record.      Query Date: March 16, 2023    By submitting this query, we are merely seeking further clarification of documentation. Please utilize your independent clinical judgment when addressing the question(s) below.    The Medical Record contains the following:   Indicators          Supporting Clinical Findings Location in Medical Record   x Anemia documented    Labs showed microcytic anemia (10.2)   - ASA/plavix/Statin/Eliquis. 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, hypoxia and fevers noted 3/08 and 3/09 H&P 2/27  HM  PN 3/10 HM    PN 3/12 ID          x H&H  02/28/23 05:49 03/01/23 09:38 03/02/23 08:17 03/04/23 04:15   Hemoglobin 10.3 (L) 9.3 (L) 9.5 (L) 7.6 (L)   Hematocrit 33.5 (L) 30.3 (L) 31.5 (L) 24.1 (L)      03/06/23 22:38 03/08/23 23:25 03/10/23 04:13 03/11/23 04:25 03/12/23 04:20   Hemoglobin 7.5 (L) 7.1 (L) 6.4 (L) 5.9 (LL) 8.3 (L)   Hematocrit 23.7 (L) 22.7 (L) 20.6 (L) 19.7 (LL) 26.1 (L)    Labs                           x BP                    HR Pulse:  [76-99] 77  BP: (119-144)/(51-88) 134/63 PN 3/11 HM   x GI bleeding documented BRBPR (bright red blood per rectum)  - BRBPR on 3/15  - holding ASA, Plavix, Eliquis  - positive stool occult  - s/p 2 units pRBC on 3/11. H/H stable since transfusion  - GI consulted. followup recs PN 3/15 HM          x Acute bleeding (Non GI site) Procedures:  1) Left external iliac to right SFA bypass with 8mm PTFE  2) Left common and external iliac artery PTA/S  3) Aortogram, pelvic angiogram  4) Right lower extremity angiogram  5) Right SFA PTA with drug coated balloon  Estimated Blood Loss: 200 mL   Brief Op Note 3/2             x Transfusion(s) Transfusion  Transfuse RBC 1  unit   Transfuse RBC 1 unit   Transfuse RBC 1 unit  Transfusion flow sheet  3/2  3/4  3/11     x Acute/Chronic illness past medical history of severe PAD, hypertension, type 2 diabetes, history of DVT who presents with right foot pain.      Critical limb ischemia with history of revascularization of same extremity  -Vascular surgery consult. apprec recs  -- s/p fem fem bypass on 3/2/2023  -- s/p right TMA on 3/4/2023.  --No concern for surgical incision infection at this time.   PN 3/11 HM        PN 3/11 HM   x Treatments Ordered to additional unit of pRBC due to anemia.   PN 3/11 HM    Other         Provider, please specify anemia diagnosis or diagnoses associated with above clinical findings.   Select all that apply.  [  X ] Acute blood loss anemia    [   ] Acute blood loss anemia expected post-operatively    [   ] Chronic blood loss anemia     [   ] Other Hematological Diagnosis (please specify): _________________   [   ] Clinically Undetermined              Please document in your progress notes daily for the duration of treatment, until resolved, and include in your discharge summary.    Form No. [id number]

## 2023-03-16 NOTE — CONSULTS
"[facility]  Gastroenterology  Consult Note    Patient Name: Nelsy Marino  MRN: [id number]  Admission Date: 2/27/2023  Hospital Length of Stay: 17 days  Code Status: Full Code   Attending Provider: Tanner Coello MD   Consulting Provider: Bj Rizzo DO  Primary Care Physician: Juan F Garay  Principal Problem:Critical limb ischemia with history of revascularization of same extremity    Inpatient consult to Gastroenterology  Consult performed by: Bj Rizzo DO  Consult ordered by: Hector Kuo MD        Subjective:     HPI:  Ms. Marino is an 82 year old female with past medical hx of HTN, T2DM, DVT, and severe PAD who presented with right foot pain. She is currently now s/p tma and SFA artery bypass. Currently on ASA, Plavix, and Eliquis. GI consulted for episode of bilious emesis with streaks of blood as well as diarrhea that was black in appearence. Hgb 7.7 with baseline roughly around 7-9. She did endorse a "mild" case of nausea overnight prior to the emesis episode. She denies, abdominal tenderness/discomfort, chest pain, SOB, and is overall not in acute distress at time of eval. She states the bloody bm felt like a regular diarrhea episode. Triple anticoagulation therapy stopped overnight due to concerns of bleeding. Patient made NPO.       Past Medical History:   Diagnosis Date    CHF (congestive heart failure)     Diabetes mellitus     DVT (deep venous thrombosis)     Hypertension     Miscarriage        Past Surgical History:   Procedure Laterality Date     femoral vascular surgery       ANGIOGRAPHY OF LOWER EXTREMITY Right 2/23/2023    Procedure: Angiogram Extremity Unilateral;  Surgeon: Danny Dunbar MD;  Location: [facility];  Service: Vascular;  Laterality: Right;  RN PHONE PREOP 2/20/2023----CK CONSENT    CREATION OF FEMORAL-FEMORAL ARTERY BYPASS WITH GRAFT Bilateral 3/2/2023    Procedure: CREATION, BYPASS, ARTERIAL, FEMORAL TO FEMORAL, USING GRAFT, LEFT FEMORAL ENDARTERECTOMY, " LEFT ILIAC ARTERY STENT PLACEMENT, RIGHT LOWER EXTREMITY ANGIOGRAM AND RIGHT SFA  ANGIOPLASTY;  Surgeon: Danny Dunbar MD;  Location: [address];  Service: Vascular;  Laterality: Bilateral;   744.47 MGY  175.36 Gycm  Flouro time 20.5 mins      CREATION OF ILIOFEMORAL ARTERY BYPASS Right 3/20/2019    Procedure: CREATION, BYPASS, ARTERIAL, ILIAC TO FEMORAL, RIGHT LOWER EXTREMITY, RIGHT PROFUNDAPLASTY;  Surgeon: Danny Dunbar MD;  [facility];  Service: Vascular;  Laterality: Right;  11:00AM START PER ANNE  RN PREOP 3/13/2019  ACT'S, CELL SAVER, GRAFTS, BOOK WATER---NEED H/P  CONSENT IN AM------HGBA1C    FOOT AMPUTATION THROUGH METATARSAL Right 3/4/2023    Procedure: AMPUTATION, FOOT, TRANSMETATARSAL;  Surgeon: Benjamin Godfrey DPM;  Location: [address];  Service: Podiatry;  Laterality: Right;    HYSTERECTOMY      ?unsure cervix present    INCISION AND DRAINAGE Right 2019    Procedure: Incision and Drainage - R groin washout, possible muscle flap;  Surgeon: Danny Dunbar MD;  Location: [address];  Service: Vascular;  Laterality: Right;  groin washout    INCISION AND DRAINAGE OF GROIN Right 5/3/2019    Procedure: Incision and Drainage R groin;  Surgeon: Danny Dubnar MD;  Location: [address];  Service: Vascular;  Laterality: Right;       Review of patient's allergies indicates:   Allergen Reactions    Motrin [ibuprofen] Itching     Family History       Problem Relation (Age of Onset)    Diabetes Father    Hypertension Father          Tobacco Use    Smoking status: Former     Types: Cigarettes     Quit date:      Years since quittin.2    Smokeless tobacco: Never   Substance and Sexual Activity    Alcohol use: No    Drug use: No    Sexual activity: Never     Review of Systems   Constitutional:  Positive for fatigue. Negative for chills, diaphoresis and fever.   Respiratory:  Negative for cough and shortness of breath.    Cardiovascular:  Positive  for leg swelling.   Gastrointestinal:  Positive for blood in stool, diarrhea, nausea and vomiting. Negative for abdominal distention, abdominal pain, anal bleeding, constipation and rectal pain.   Musculoskeletal:  Positive for arthralgias and myalgias.   Skin:  Positive for color change and wound.   Neurological:  Negative for dizziness, speech difficulty, weakness and headaches.   Psychiatric/Behavioral:  Negative for agitation, behavioral problems, confusion and decreased concentration.    Objective:     Vital Signs (Most Recent):  Temp: 99.9 °F (37.7 °C) (03/16/23 0755)  Pulse: 83 (03/16/23 0755)  Resp: 16 (03/16/23 0755)  BP: 125/63 (03/16/23 0755)  SpO2: 95 % (03/16/23 0755) Vital Signs (24h Range):  Temp:  [98 °F (36.7 °C)-100.3 °F (37.9 °C)] 99.9 °F (37.7 °C)  Pulse:  [71-87] 83  Resp:  [16-18] 16  SpO2:  [90 %-99 %] 95 %  BP: (125-146)/(60-69) 125/63     Weight: 73.5 kg (162 lb 0.6 oz) (03/10/23 0600)  Body mass index is 27.81 kg/m².      Intake/Output Summary (Last 24 hours) at 3/16/2023 0916  Last data filed at 3/16/2023 0415  Gross per 24 hour   Intake --   Output 1600 ml   Net -1600 ml       Lines/Drains/Airways       Drain  Duration             Female External Urinary Catheter 03/05/23 0132 11 days              Peripheral Intravenous Line  Duration                  Peripheral IV - Single Lumen 03/14/23 0203 22 G Right Antecubital 2 days                    Physical Exam  Constitutional:       General: She is awake. She is not in acute distress.     Appearance: She is not toxic-appearing or diaphoretic.   HENT:      Head: Normocephalic and atraumatic.   Cardiovascular:      Rate and Rhythm: Normal rate and regular rhythm.      Pulses: Normal pulses.      Heart sounds: Murmur heard.   Pulmonary:      Effort: Pulmonary effort is normal. No respiratory distress.      Breath sounds: Normal breath sounds. No wheezing.   Abdominal:      General: Abdomen is flat. There is no distension.      Palpations: Abdomen  is soft. There is no mass.      Tenderness: There is no abdominal tenderness. There is no guarding.   Neurological:      General: No focal deficit present.      Mental Status: She is alert, oriented to person, place, and time and easily aroused.   Psychiatric:         Mood and Affect: Mood normal.         Behavior: Behavior normal. Behavior is cooperative.       Significant Labs:  All pertinent lab results from the last 24 hours have been reviewed.    Significant Imaging:  Imaging results within the past 24 hours have been reviewed.    Assessment/Plan:     GI  BRBPR (bright red blood per rectum)  Initial consult for concerns of BRBPR overnight. Patient denies abdominal discomfort, distnesion, or changes in bowel function. States it feels like a regular diarrhea type bowel movement. Patient gets colonoscopy regularly with last being 5 years prior. A polyp was found and was told to come back in three months with repeat being non-contributory.  at bedside states it may be around time for repeat colonoscopy.   - KAITLIN negative for bright red blood, notable for dark green stool, likely bilious   - No sign of obvious visual trauma when examining the rectal area   - Hgb stable, no indication for scope at this time   - Recommend clear liquid diet for now   - Defer restarting anticoagulation/anti-platelet to vascular team Currently no indication from a GI standpoint to stop therapy   - Case discussed with fellow and staff Dr. Rodriguez, GI will follow-up patient     Bilious vomiting  Patient endorsed new 1x episode of nausea with associated emesis overnight. She currently denies discomfort or tenderness. She does have a hx of GERD however states it is not daily, or frequent, and medication resolves symptoms. Described as bilious emesis with streaks/specks of blood seen mixed in. This is new for her. She denies having had EGD done for any reason in the past.   - Hgb currently stable without tenderness, or symptom  reoccurrence.   - Clear liquids for now   - Will f/u with patient regarding sxs reoccurrence  - No current indicated for EGD at this time              Thank you for your consult. I will follow-up with patient. Please contact us if you have any additional questions.    Bj Rizzo,   Gastroenterology  [address] [facility]

## 2023-03-16 NOTE — PROGRESS NOTES
[facility]  Podiatry  Progress Note    Patient Name: Nelsy Marino  MRN: [id number]  Admission Date: 2/27/2023  Hospital Length of Stay: 17 days  Attending Physician: Tanner Coello MD  Primary Care Provider: [facility]     Subjective:     Interval History: Vitals stable. No new pedal complaints. Dressings clean dry, intact. WBCs still downtrending, ID signed off.       Follow-up For: Procedure(s) (LRB):  AMPUTATION, FOOT, TRANSMETATARSAL (Right)    Post-Operative Day: 12 Days Post-Op    Scheduled Meds:   amLODIPine  10 mg Oral Daily    apixaban  5 mg Oral BID    aspirin  81 mg Oral Daily    atorvastatin  40 mg Oral QHS    [START ON 3/24/2023] cephALEXin  1,000 mg Oral Q12H    ciprofloxacin HCl  500 mg Oral Q12H    clopidogreL  75 mg Oral Daily    dextromethorphan-guaiFENesin  mg  1 tablet Oral BID    DULoxetine  30 mg Oral BID    furosemide  40 mg Oral BID    insulin aspart U-100  0-5 Units Subcutaneous QID (AC & HS)    insulin aspart U-100  4 Units Subcutaneous TIDWM    insulin detemir U-100  12 Units Subcutaneous Daily    levalbuterol  1.25 mg Nebulization Q8H WA    melatonin  6 mg Oral Nightly    methocarbamoL  500 mg Oral QID     Continuous Infusions:  PRN Meds:sodium chloride, sodium chloride, acetaminophen, benzonatate, dextrose 10%, dextrose 10%, dextrose 10%, dextrose 10%, dextrose, dextrose, dextrose, dextrose, glucagon (human recombinant), glucagon (human recombinant), glucagon (human recombinant), HYDROmorphone, levalbuterol, naloxone, ondansetron, prochlorperazine, simethicone, sodium chloride 0.9%, traMADoL    Review of Systems   Constitutional:  Negative for chills and fever.   HENT:  Negative for hearing loss and trouble swallowing.    Eyes:  Negative for discharge and visual disturbance.   Respiratory:  Negative for chest tightness and shortness of breath.    Cardiovascular:  Negative for chest pain and palpitations.   Gastrointestinal:  Negative for abdominal  distention, abdominal pain, nausea and vomiting.   Genitourinary:  Negative for difficulty urinating and hematuria.   Musculoskeletal:  Negative for arthralgias and back pain.   Skin:  Positive for color change and wound. Negative for rash.   Neurological:  Negative for dizziness and headaches.   Objective:     Vital Signs (Most Recent):  Temp: 99.9 °F (37.7 °C) (03/16/23 0755)  Pulse: 84 (03/16/23 1516)  Resp: 16 (03/16/23 0755)  BP: 125/63 (03/16/23 0755)  SpO2: 95 % (03/16/23 0755) Vital Signs (24h Range):  Temp:  [98 °F (36.7 °C)-100.3 °F (37.9 °C)] 99.9 °F (37.7 °C)  Pulse:  [71-89] 84  Resp:  [16-18] 16  SpO2:  [90 %-99 %] 95 %  BP: (125-146)/(60-69) 125/63     Weight: 73.5 kg (162 lb 0.6 oz)  Body mass index is 27.81 kg/m².    Foot Exam    General  Orientation: alert and oriented to person, place, and time   Affect: appropriate       Right Foot/Ankle     Inspection and Palpation  Tenderness: (TMA site, mild)  Swelling: (TMA site, mild)  Skin Exam: drainage, maceration, abnormal color and ulcer (incision);     Neurovascular  Dorsalis pedis: doppler  Posterior tibial: doppler  Saphenous nerve sensation: diminished  Tibial nerve sensation: diminished  Superficial peroneal nerve sensation: diminished  Deep peroneal nerve sensation: diminished  Sural nerve sensation: diminished    Comments  S/p right tma. Sutures are intact, 2 sutures previously removed at site of drainage. Scant serosanguinous drainage from central area of incision, with periwound maceration. Jodie-incision/wound skin mildly necrotic. Moderate tenderness throughout surgical site.     Left Foot/Ankle      Inspection and Palpation  Tenderness: none   Swelling: none   Skin Exam: skin intact;     Neurovascular  Dorsalis pedis: absent  Posterior tibial: doppler  Saphenous nerve sensation: diminished  Tibial nerve sensation: diminished  Superficial peroneal nerve sensation: diminished  Deep peroneal nerve sensation: diminished  Sural nerve sensation:  diminished    Comments  No open wounds noted          Laboratory:  Blood Cultures: No results for input(s): LABBLOO in the last 48 hours.  CBC:   Recent Labs   Lab 03/16/23  1354   WBC 14.39*   RBC 2.93*   HGB 7.7*   HCT 25.1*   *   MCV 86   MCH 26.3*   MCHC 30.7*     CMP:   Recent Labs   Lab 03/16/23  0222   *   CALCIUM 9.0   ALBUMIN 1.9*   PROT 7.1      K 4.6   CO2 29      BUN 42*   CREATININE 1.0   ALKPHOS 81   ALT 27   AST 45*   BILITOT 0.6     CRP:   Recent Labs   Lab 03/10/23  0942   .0*     ESR:   Recent Labs   Lab 03/10/23  0942   SEDRATE 64*     Microbiology Results (last 7 days)       Procedure Component Value Units Date/Time    Aerobic culture [[id number]]  (Abnormal)  (Susceptibility) Collected: 03/10/23 1340    Order Status: Completed Specimen: Wound from Foot, Right Updated: 03/16/23 1452     Aerobic Bacterial Culture [facility]  Few      Narrative:      Right foot tma site wound    Stool culture [[id number]]     Order Status: No result Specimen: Stool     Blood culture [[id number]] Collected: 03/10/23 0942    Order Status: Completed Specimen: Blood from Antecubital, Right Arm Updated: 03/15/23 1212     Blood Culture, Routine No growth after 5 days.    Blood culture [[id number]] Collected: 03/10/23 0943    Order Status: Completed Specimen: Blood from Peripheral, Right Hand Updated: 03/15/23 1212     Blood Culture, Routine No growth after 5 days.    Culture, Anaerobe [[id number]] Collected: 03/10/23 1340    Order Status: Completed Specimen: Wound from Foot, Right Updated: 03/14/23 0942     Anaerobic Culture No anaerobes isolated    Narrative:      Right foot tma site wound    Blood culture [[id number]] Collected: 03/08/23 2325    Order Status: Completed Specimen: Blood from Peripheral, Lower Arm, Right Updated: 03/14/23 0612     Blood Culture, Routine No growth after 5 days.    Narrative:      Right Peripheral    Blood culture [[id number]] Collected: 03/08/23  2325    Order Status: Completed Specimen: Blood from Peripheral, Lower Arm, Left Updated: 03/14/23 0612     Blood Culture, Routine No growth after 5 days.    Narrative:      Left Peripheral    Culture, Anaerobe [[id number]] Collected: 03/04/23 1141    Order Status: Completed Specimen: Incision site from Foot, Right Updated: 03/13/23 1009     Anaerobic Culture No anaerobes isolated    Narrative:      3rd Metatarsal    Respiratory Infection Panel (PCR), Nasopharyngeal [[id number]]     Order Status: No result Specimen: Nasopharyngeal Swab     Culture, Respiratory with Gram Stain [[id number]]     Order Status: No result Specimen: Respiratory     Gram stain [[id number]] Collected: 03/10/23 1340    Order Status: Completed Specimen: Wound from Foot, Right Updated: 03/10/23 1924     Gram Stain Result Rare WBC's      No organisms seen    Narrative:      Right foot tma site wound    Clostridium difficile EIA [[id number]]     Order Status: Canceled Specimen: Stool           Specimen (24h ago, onward)      None                Assessment/Plan:     Cardiac/Vascular  * Critical limb ischemia with history of revascularization of same extremity  Per vascular surgery    Endocrine  Type 2 diabetes mellitus with chronic kidney disease, with long-term current use of insulin  Per hospital medicine      Orthopedic  S/P transmetatarsal amputation of foot, right  Plan:  -Continuing to monitor right TMA site. No intervention by podiatry if WBCs continue to downtrend or remain stable.   -Antibiotic plan per ID.  -Appreciate vascular surgery recs.  -Dressing changes by nursing.  -RLE WB to heel in surgical shoe for transfers or short distances.   -Elevate RLE at all times while in bed or chair.  -Podiatry will follow.   DC Instructions: Please order ambulatory referral to podiatry (Short) and vascular surgery for followup. RLE WB to heel in surgical shoe for transfers or short distances. HH/facility dressing changes for R foot 3x/week:  cleanse/flush incision/wound with saline, pack with antimicrobial packing strip, cover with gauze, wrap foot with cast padding and coban.         Lalo Pollock DPM PGY-3  Podiatric Medicine & Surgery  [facility]  Secure Chat Preferred  Mobile: [phone]  Pager: [phone]

## 2023-03-16 NOTE — PT/OT/SLP PROGRESS
Physical Therapy Treatment    Patient Name:  Nelsy Marino   MRN:  [id number]    Recommendations:     Discharge Recommendations: nursing facility, skilled  Discharge Equipment Recommendations: wheelchair, lift device, hospital bed (TBD by next level of care)  Barriers to discharge: Inaccessible home and Decreased caregiver support    Assessment:     Nelsy Marino is a 82 y.o. female admitted with a medical diagnosis of Critical limb ischemia with history of revascularization of same extremity.  She presents with the following impairments/functional limitations: impaired endurance, weakness, impaired self care skills, impaired functional mobility, gait instability, impaired balance, decreased lower extremity function, decreased safety awareness, pain, orthopedic precautions, decreased ROM. Pt would benefit from a skilled nursing facility for: Dynamic/static standing/sitting balance through skilled balance training, strengthening with the use of skilled therapeutic exercises interventions, and mobility through adaptive equipment training. Pt continues to benefit from a collaborative PT/OT program to improve quality of life and focus on recovery of impairments.      Rehab Prognosis: Good; patient would benefit from acute skilled PT services to address these deficits and reach maximum level of function.    Recent Surgery: Procedure(s) (LRB):  AMPUTATION, FOOT, TRANSMETATARSAL (Right) 11 Days Post-Op    Plan:     During this hospitalization, patient to be seen 3 x/week to address the identified rehab impairments via gait training, therapeutic activities, therapeutic exercises, neuromuscular re-education and progress toward the following goals:    Plan of Care Expires:  04/04/23    Subjective     Chief Complaint: feeling poorly in general  Patient/Family Comments/goals: to feel better  Pain/Comfort:  Pain Rating 1:  (not rated)  Location - Orientation 1: generalized  Location 1:  (feeling poorly)  Pain Addressed 1:  Reposition, Distraction  Pain Rating Post-Intervention 1:  (not rated)      Objective:     Communicated with RN prior to session.  Patient found right sidelying with PureWick, peripheral IV, oxygen upon PT entry to room.     General Precautions: Standard, fall  Orthopedic Precautions: RLE weight bearing as tolerated  Braces:  (DARCO shoe)  Respiratory Status: Nasal cannula     Functional Mobility:  Bed Mobility:     Rolling Left:  stand by assistance  Scooting: contact guard assistance  Supine to Sit: minimal assistance  Transfers:     Bed to Chair: maximal assistance with  no AD  using  Squat Pivot  Pt attempted to turn the wrong way  Gait: N/T this date  Balance:    Static Sitting: SBA  Dynamic Sitting: SBA  Static Standing N/T  Dynamic Standing: N/T      AM-PAC 6 CLICK MOBILITY  Turning over in bed (including adjusting bedclothes, sheets and blankets)?: 3  Sitting down on and standing up from a chair with arms (e.g., wheelchair, bedside commode, etc.): 2  Moving from lying on back to sitting on the side of the bed?: 3  Moving to and from a bed to a chair (including a wheelchair)?: 2  Need to walk in hospital room?: 2  Climbing 3-5 steps with a railing?: 1  Basic Mobility Total Score: 13       Treatment & Education:  Patient educated on role of therapy, goals of session, and benefits of mobilizing.   Discussed PT plan of care during hospitalization.   Patient educated on calling for assistance.   Patient educated on how their diagnosis impacts their mobility within PT scope of practice.   All questions answered within PT scope of practice.    Patient left up in chair with all lines intact, call button in reach, RN notified, and pt's guest present.    GOALS:   Multidisciplinary Problems       Physical Therapy Goals          Problem: Physical Therapy    Goal Priority Disciplines Outcome Goal Variances Interventions   Physical Therapy Goal     PT, PT/OT Ongoing, Progressing     Description: Goals to be met by: 3/20      Patient will increase functional independence with mobility by performin. Supine to sit with Stand-by Assistance  2. Rolling to Left and Right with Stand-by Assistance.  3. Sit to stand transfer with Contact Guard Assistance using Rolling Walker  4. Gait  x 50 feet with Contact Guard Assistance using Rolling Walker.   5. Ascend/descend 1 stair with bilateral Handrails Contact Guard Assistance using Rolling Walker.                          Time Tracking:     PT Received On: 03/15/23  PT Start Time: 1140     PT Stop Time: 1154  PT Total Time (min): 14 min     Billable Minutes: Therapeutic Activity 14    Treatment Type: Treatment  PT/PTA: PT     Number of PTA visits since last PT visit: 0     03/15/2023

## 2023-03-16 NOTE — SUBJECTIVE & OBJECTIVE
Interval History: Patient lying in bed, no acute distress. Family at bedside. Continues to report BLE pain. Holding home Eliquis due to drop in H/H. Since 2 units pRBC, H/H has been stable. Patient tolerating meropenem and linezolid.         Intake/Output Summary (Last 24 hours) at 3/16/2023 0997  Last data filed at 3/16/2023 0415  Gross per 24 hour   Intake --   Output 1600 ml   Net -1600 ml           Review of Systems   Constitutional:  Positive for activity change and fatigue. Negative for chills and fever.   HENT:  Negative for congestion.    Eyes:  Negative for visual disturbance.   Respiratory:  Positive for cough and shortness of breath.    Cardiovascular:  Positive for leg swelling. Negative for chest pain.   Gastrointestinal:  Negative for abdominal distention, abdominal pain, nausea and vomiting.   Genitourinary:  Negative for dysuria.   Musculoskeletal:  Positive for arthralgias (right foot) and myalgias (right foot). Negative for back pain.   Skin:  Negative for rash and wound.   Neurological:  Positive for weakness. Negative for dizziness.   Psychiatric/Behavioral:  Negative for confusion.      Objective:     Vital Signs (Most Recent):  Temp: 99.9 °F (37.7 °C) (03/16/23 0755)  Pulse: 83 (03/16/23 0755)  Resp: 16 (03/16/23 0755)  BP: 125/63 (03/16/23 0755)  SpO2: 95 % (03/16/23 0755)   Vital Signs (24h Range):  Temp:  [98 °F (36.7 °C)-100.3 °F (37.9 °C)] 99.9 °F (37.7 °C)  Pulse:  [71-87] 83  Resp:  [16-18] 16  SpO2:  [90 %-99 %] 95 %  BP: (125-146)/(60-69) 125/63     Weight: 73.5 kg (162 lb 0.6 oz)  Body mass index is 27.81 kg/m².    Intake/Output Summary (Last 24 hours) at 3/16/2023 0947  Last data filed at 3/16/2023 0415  Gross per 24 hour   Intake --   Output 1600 ml   Net -1600 ml        Physical Exam  Constitutional:       Appearance: Normal appearance.   HENT:      Head: Normocephalic.      Mouth/Throat:      Mouth: Mucous membranes are moist.   Eyes:      Extraocular Movements: Extraocular  movements intact.      Pupils: Pupils are equal, round, and reactive to light.   Neck:      Comments: JVD absent  Cardiovascular:      Rate and Rhythm: Normal rate and regular rhythm.      Pulses: Normal pulses.      Heart sounds: Normal heart sounds.   Pulmonary:      Effort: Respiratory distress present.      Breath sounds: Normal breath sounds.   Abdominal:      General: Bowel sounds are normal. There is no distension.      Palpations: Abdomen is soft.      Tenderness: There is no abdominal tenderness.   Musculoskeletal:         General: Normal range of motion.      Cervical back: Neck supple.      Right lower leg: Edema present.      Left lower leg: Edema present.      Comments: Right TMA with dressing c/d/I and surgical shoe in place   Neurological:      General: No focal deficit present.      Mental Status: She is alert.   Psychiatric:         Mood and Affect: Mood normal.         Cognition and Memory: Cognition is impaired. Memory is impaired.       Significant Labs: A1C:   Recent Labs   Lab 02/28/23  0549   HGBA1C 8.2*       Blood Culture:   No results for input(s): LABBLOO in the last 48 hours.    CBC:   Recent Labs   Lab 03/15/23  0640 03/16/23  0045 03/16/23 0222   WBC 15.71* 12.49 13.48*   HGB 8.0* 8.3* 7.7*   HCT 25.9* 26.9* 25.4*   * 516* 516*       CMP:   Recent Labs   Lab 03/15/23  0640 03/16/23  0045 03/16/23 0222    142 142   K 4.1 4.2 4.6    104 103   CO2 30* 29 29   * 159* 147*   BUN 48* 42* 42*   CREATININE 1.1 0.9 1.0   CALCIUM 9.0 8.9 9.0   PROT 7.4 7.3 7.1   ALBUMIN 1.9* 1.8* 1.9*   BILITOT 0.6 0.6 0.6   ALKPHOS 65 62 81   AST 54* 45* 45*   ALT 28 27 27   ANIONGAP 10 9 10       Coagulation:   Recent Labs   Lab 03/16/23 0222   APTT 37.8*       Lactic Acid:   No results for input(s): LACTATE in the last 48 hours.    Magnesium:   No results for input(s): MG in the last 48 hours.    POCT Glucose:   Recent Labs   Lab 03/15/23  1133 03/15/23  1609 03/15/23  2100    POCTGLUCOSE 272* 158* 123*           Significant Imaging: I have reviewed all pertinent imaging results/findings within the past 24 hours.

## 2023-03-16 NOTE — ASSESSMENT & PLAN NOTE
Plan:  -Continuing to monitor right TMA site. No intervention by podiatry if WBCs continue to downtrend or remain stable.   -Antibiotic plan per ID.  -Appreciate vascular surgery recs.  -Dressing changes by nursing.  -RLE WB to heel in surgical shoe for transfers or short distances.   -Elevate RLE at all times while in bed or chair.  -Podiatry will follow.   DC Instructions: Please order ambulatory referral to podiatry (Short) and vascular surgery for followup. RLE WB to heel in surgical shoe for transfers or short distances. HH/facility dressing changes for R foot 3x/week: cleanse/flush incision/wound with saline, pack with antimicrobial packing strip, cover with gauze, wrap foot with cast padding and coban.

## 2023-03-16 NOTE — PROGRESS NOTES
[facility] Medicine  Progress Note    Patient Name: Nelsy aMrino  MRN: [id number]  Patient Class: IP- Inpatient   Admission Date: 2/27/2023  Length of Stay: 17 days  Attending Physician: Tanner Coello MD  Primary Care Provider: Juan F Garay        Subjective:     Principal Problem:Critical limb ischemia with history of revascularization of same extremity        HPI:  This is an 82-year-old female with a past medical history of severe PAD, hypertension, type 2 diabetes, history of DVT who presents with right foot pain.    Patient reports worsening right foot pain, swelling and discoloration that started after she had an ingrown toenail removed by her podiatrist.  She reports that her discoloration started since 2/7. Of note, the patient underwent an angiogram on 02/23 by vascular surgery. In the ED, she was tachycardic (112), and hypertensive.  Labs showed microcytic anemia (10.2), elevated sed rate (112), elevated CRP (56.5), elevated lactic acid (2.5).  Foot x-ray showed DJD and spurs on the calcaneus with no acute process.  Vascular surgery was consulted and recommended continuing antibiotics and evaluating in the morning.  She was given morphine 4 mg IV, vancomycin, Zosyn and was admitted for further management.      Overview/Hospital Course:   82-year-old female with a past medical history of severe PAD, hypertension, type 2 diabetes, history of DVT admitted on 02/27/2023 for gangrene of right toes. Of note, patient recent underwent LE angiogram by Dr. Tran on 02/23/23. Per OP report, was able to get successful revascularization / resolution of stenosis. XR right foot with no fracture, dislocation, bone destruction, or foreign body seen.  There is DJD and spurs on the calcaneus.  Was started on IV Zosyn and vancomycin on admit. Blood  cx with NGTD. On 02/28, patient with hypoglycemia but was asymptomatic. Improved with glucogen. Likely due to NPO status and was still receiving  insulin.  Cardiology consulted for preoperative clearance.  Recommended stress test.  Normal myocardial perfusion scan.  Cardiology deem patient at acceptable cardiac risk for vasc surgery/anesthesia.  Vascular surgery consulted and patient underwent fem fem bypass on 3/2/2023. Podiatry also consulted and patient underwent right TMA on 3/4/23.     Stepped down to Hospital Medicine service. O2 requirement steadily improving while on diuresis.  Patient notes significant posterior ankle pain. Increased tramadol and ordered robaxin. Good urine output. Patient continues to note sleep disturbances mainly due to BLE pain. Ordering transfusion for Hb 6.4. WBC continues to rise from 13 to 15. Per vascular surgery, continuing wound care for right TMA and no evidence of active infection at the surgical site. Per ID, continuing supportive care and diuresis and discontinuing IV zosyn. Also will obtain respiratory cultures, order C diff panel and followup CT chest.            Interval History: Patient lying in bed, no acute distress. Family at bedside. Continues to report BLE pain. Resumed home Eliquis since H/H remains stable. If patient has drop in H/H or signs of active bleeding, will consult GI. Continue aspirin and plavix for now. Per ID, will transition to oral abx upon discharge to SNF.          Intake/Output Summary (Last 24 hours) at 3/16/2023 0933  Last data filed at 3/16/2023 0415  Gross per 24 hour   Intake --   Output 1600 ml   Net -1600 ml           Review of Systems   Constitutional:  Positive for activity change and fatigue. Negative for chills and fever.   HENT:  Negative for congestion.    Eyes:  Negative for visual disturbance.   Respiratory:  Positive for cough and shortness of breath.    Cardiovascular:  Positive for leg swelling. Negative for chest pain.   Gastrointestinal:  Negative for abdominal distention, abdominal pain, nausea and vomiting.   Genitourinary:  Negative for dysuria.   Musculoskeletal:   Positive for arthralgias (right foot) and myalgias (right foot). Negative for back pain.   Skin:  Negative for rash and wound.   Neurological:  Positive for weakness. Negative for dizziness.   Psychiatric/Behavioral:  Negative for confusion.      Objective:     Vital Signs (Most Recent):  Temp: 99.9 °F (37.7 °C) (03/16/23 0755)  Pulse: 83 (03/16/23 0755)  Resp: 16 (03/16/23 0755)  BP: 125/63 (03/16/23 0755)  SpO2: 95 % (03/16/23 0755)   Vital Signs (24h Range):  Temp:  [98 °F (36.7 °C)-100.3 °F (37.9 °C)] 99.9 °F (37.7 °C)  Pulse:  [71-87] 83  Resp:  [16-18] 16  SpO2:  [90 %-99 %] 95 %  BP: (125-146)/(60-69) 125/63     Weight: 73.5 kg (162 lb 0.6 oz)  Body mass index is 27.81 kg/m².    Intake/Output Summary (Last 24 hours) at 3/16/2023 0954  Last data filed at 3/16/2023 0415  Gross per 24 hour   Intake --   Output 1600 ml   Net -1600 ml        Physical Exam  Constitutional:       Appearance: Normal appearance.   HENT:      Head: Normocephalic.      Mouth/Throat:      Mouth: Mucous membranes are moist.   Eyes:      Extraocular Movements: Extraocular movements intact.      Pupils: Pupils are equal, round, and reactive to light.   Neck:      Comments: JVD absent  Cardiovascular:      Rate and Rhythm: Normal rate and regular rhythm.      Pulses: Normal pulses.      Heart sounds: Normal heart sounds.   Pulmonary:      Effort: Respiratory distress present.      Breath sounds: Normal breath sounds.   Abdominal:      General: Bowel sounds are normal. There is no distension.      Palpations: Abdomen is soft.      Tenderness: There is no abdominal tenderness.   Musculoskeletal:         General: Normal range of motion.      Cervical back: Neck supple.      Right lower leg: Edema present.      Left lower leg: Edema present.      Comments: Right TMA with dressing c/d/I and surgical shoe in place   Neurological:      General: No focal deficit present.      Mental Status: She is alert.   Psychiatric:         Mood and Affect: Mood  normal.         Cognition and Memory: Cognition is impaired. Memory is impaired.       Significant Labs: A1C:   Recent Labs   Lab 02/28/23  0549   HGBA1C 8.2*       Blood Culture:   No results for input(s): LABBLOO in the last 48 hours.    CBC:   Recent Labs   Lab 03/15/23  0640 03/16/23  0045 03/16/23 0222   WBC 15.71* 12.49 13.48*   HGB 8.0* 8.3* 7.7*   HCT 25.9* 26.9* 25.4*   * 516* 516*       CMP:   Recent Labs   Lab 03/15/23  0640 03/16/23 0045 03/16/23 0222    142 142   K 4.1 4.2 4.6    104 103   CO2 30* 29 29   * 159* 147*   BUN 48* 42* 42*   CREATININE 1.1 0.9 1.0   CALCIUM 9.0 8.9 9.0   PROT 7.4 7.3 7.1   ALBUMIN 1.9* 1.8* 1.9*   BILITOT 0.6 0.6 0.6   ALKPHOS 65 62 81   AST 54* 45* 45*   ALT 28 27 27   ANIONGAP 10 9 10       Coagulation:   Recent Labs   Lab 03/16/23 0222   APTT 37.8*       Lactic Acid:   No results for input(s): LACTATE in the last 48 hours.    Magnesium:   No results for input(s): MG in the last 48 hours.    POCT Glucose:   Recent Labs   Lab 03/15/23  1133 03/15/23  1609 03/15/23  2100   POCTGLUCOSE 272* 158* 123*           Significant Imaging: I have reviewed all pertinent imaging results/findings within the past 24 hours.      Assessment/Plan:      * Critical limb ischemia with history of revascularization of same extremity  -Vascular surgery consult. apprec recs  -- s/p fem fem bypass on 3/2/2023  -- s/p right TMA on 3/4/2023.   --No concern for surgical incision infection at this time.  -- low threshold to escalate antibiotics. If her graft becomes infected and requires antibiotics, she has no other options left for revascularizations. ID following  -- Q4H neurovasc checks  -- ASA/plavix/Statin/Eliquis. Hepatin gtt in replace of eliquis for now due to worsening anemia. Discontinued AC due to drop in Hb.   -- PT/OT  --Recommend PRBC transfusion  --Consider GI eval or imaging   --UA cultures negative, blood cultures no growth to date, wound cultures NGTD,  "cdiff pending.  -Podiatry consulted. apprec recs  --Dressing changes by nursing.  --RLE WB to heel in surgical shoe for transfers or short distances.   --Elevate RLE at all times while in bed or chair.  -- Continuing to monitor right TMA site. If foot worsens or no other source is found then may consider opening of incision and exploration of surgical wound   - outpatient podiatry wound care instructions when patient medically ready for discharge: Please order ambulatory referral to podiatry for outpatient followup. RLE WB to heel in surgical shoe for transfers or short distances. HH/facility dressing changes R foot 3x/week: cleanse incision with saline, apply betadine soaked adaptic, apply gauze, wrap with cast padding and coban.       BRBPR (bright red blood per rectum)  - BRBPR on 3/15  - holding ASA, Plavix, Eliquis   - positive stool occult   - s/p 2 units pRBC on 3/11. H/H stable since transfusion  - GI consulted. followup recs        Bilious vomiting  - reports regular bowel movements  - bili normal, AST/ALT normal   - clear liquid diet      Acute encephalopathy  - on 3/12, patient more somnolent and had tremors  - discontinued gabapentin and depakote  - Scr back to baseline so low suspicion for uremia   - continue antibiotic for possible infection   - RESOLVED      Leukocytosis  - see "sepsis"  - IMPROVING      S/P transmetatarsal amputation of foot, right  - stable  - appreciated vascular surgery and podiatry recs  -- s/p TMA 3/4  -- low threshold to escalate antibiotics. If her graft becomes infected and requires antibiotics, she has no other options left for revascularizations  -- Q4H neurovasc checks  -- ASA/plavix/Statin/Eliquis   -- PT/OT  --Dressing changes by nursing.  --RLE WB to heel in surgical shoe for transfers or short distances.   --Elevate RLE at all times while in bed or chair.  -- DC Instructions: Please order ambulatory referral to podiatry for outpatient followup. RLE WB to heel in " surgical shoe for transfers or short distances. HH/facility dressing changes R foot 3x/week: cleanse incision with saline, apply betadine soaked adaptic, apply gauze, wrap with cast padding and coban.   - scheduled tylenol and prn tramadol      Vascular graft infection  - on chronic suppression with keflex  - escalated to zosyn due to increased oxygen requirement to cover to possible PNA  - discontinued zosyn per ID. Started linezolid      Hypoglycemia  Better now  Continue levemir and LDSS    Sepsis  -Right foot appears to have wet gangrene on exam. S/p right TMA on 3/4  - fever 100.5 on 3/9  - WBC risin --> 15 --> 18  - procal slightly elevated at 0.65  - ESR 64,   - per vascular surgery, right TMA does not appear infected  - per podiatry, if no other infectious source identified, will consider opening of incision and exploration of surgical wound.   - ID consulted. apprec recs  -- discontinued IV zosyn. Started linezolid to cover for SSTI  -- continue supportive care  -- blood culture NGTD  - stool sample for c diff panel could not be obtained   -- followup sputum culture, RIP  -- CT C/A/P negative for infection        History of DVT (deep vein thrombosis)  Not on AC outpatient.    - heparin gtt in replace of eliquis for now until Hb stable and no additional surgical intervention indicated  - was switched to eliquis but now holding AC on 3/11 due to drop in Hb. No evidence of active bleeding so will reassess H/H tomrrow and if stable will resume eliquis.    Acute hypoxemic respiratory failure  Patient with Hypercapnic and Hypoxic Respiratory failure which is Acute on chronic.  she is not on home oxygen. Supplemental oxygen was provided and noted-  .   Signs/symptoms of respiratory failure include- tachypnea, increased work of breathing and lethargy. Contributing diagnoses includes - CHF, Obesity Hypoventilation and Pneumonia Labs and images were reviewed. Patient Has recent ABG, which has been  reviewed. Will treat underlying causes and adjust management of respiratory failure.    PLAN:  - increased from 2 L NC to 12 L NC on 3/78  - now weaned to 2 L NC  - CXR showed pulmonary edema with no consolidation  - switched to xoponex due to tremors on duonebs  - incentive spirometry   - mucinex and prn tessalon perles  - chest PT and acapella   - OOB to chair  - IMPROVING        Type 2 diabetes mellitus with chronic kidney disease, with long-term current use of insulin  Stable from hypoglycemia standpoint; continue levemir and LDSSI      Essential hypertension  -Continue home medications:  Amlodipine 10 mg daily, hydrochlorothiazide 25 mg daily, losartan 100 mg daily      PVD (peripheral vascular disease)  Cont. home medications:  Aspirin, cilostazol  Consult vascular surgery: [place] [facility] [place]23 for RLE bypass tomorrow with Dr. Dunbar      VTE Risk Mitigation (From admission, onward)         Ordered     apixaban tablet 5 mg  2 times daily         03/16/23 0959     Reason for No Pharmacological VTE Prophylaxis  Once        Question:  Reasons:  Answer:  IV Heparin w/in 24 hrs. Pre or Post-Op    03/02/23 1918     IP VTE HIGH RISK PATIENT  Once         03/02/23 1918     Place sequential compression device  Until discontinued         02/27/23 2200                Discharge Planning   JUAN ALBERTO: 3/16/2023     Code Status: Full Code   Is the patient medically ready for discharge?: Yes    Reason for patient still in hospital (select all that apply): Patient unstable, Patient trending condition, Laboratory test, Treatment and Consult recommendations  Discharge Plan A: Long-term acute care facility (LTAC)   Discharge Delays: None known at this time        Time spent in care of patient: > 35 minutes         Tanner Coello MD  Department of Hospital Medicine   [facility]

## 2023-03-16 NOTE — PROGRESS NOTES
[facility]  Endocrinology  Progress Note    Admit Date: 2/27/2023     Reason for Consult: Management of T2DM, Hyperglycemia     Surgical Procedure and Date: s/p  Left external iliac to right SFA bypass with 8mm PTFE; Left common and external iliac artery angioplasty and stenting, 8mm Lifestent; Aortogram, pelvic angiogram; Right lower extremity angiogram; and Right SFA angioplasty with plain and drug coated 4mm balloon on 3/8/2023    Diabetes diagnosis year: approx 26 years ago    Home Diabetes Medications:  Glipizide 10 mg daily, Novolog-NPH 70/30 50 units BID (prior Metformin but no longer) - uncertain why both sulfonylurea and insulin    How often checking glucose at home? 1-3 x day   BG readings on regimen: 100-200  Hypoglycemia on the regimen?  Yes at times  Missed doses on regimen?  No    Diabetes Complications include:     Hyperglycemia and Diabetic dermatitis    Complicating diabetes co morbidities:   HTN; DVT; CHF    HPI: This is an 82-year-old female with a past medical history of severe PAD, hypertension, and insulin dependant type 2 diabetes, history of DVT who presents with right foot pain. Patient reports worsening right foot pain, swelling and discoloration that started after she had an ingrown toenail removed by her podiatrist.  She reports that her discoloration started since 2/7. Of note, the patient underwent an angiogram on 02/23 by vascular surgery. In the ED, she was tachycardic (112), and hypertensive.  Labs showed microcytic anemia (10.2), elevated sed rate (112), elevated CRP (56.5), elevated lactic acid (2.5).  Foot x-ray showed DJD and spurs on the calcaneus with no acute process. Vascular surgery recommended  transfer to [facility] [place] 3/2/23. Now s/p  Left external iliac to right SFA bypass with 8mm PTFE; Left common and external iliac artery angioplasty and stenting, 8mm Lifestent; Aortogram, pelvic angiogram; Right lower extremity angiogram; and Right SFA angioplasty  "with plain and drug coated 4mm balloon on 3/8/2023. Endocrine consulted to manage hyperglycemia and type 2 diabetes. Primary requesting BG not exceed 180 mg/dL.      Lab Results   Component Value Date    HGBA1C 8.2 (H) 02/28/2023     On evaluation the patient confirms that she has been a diabetic for 26 years.  She is on both Glipizide and 70/30 insulin.  Managed by her PCP.  She denies any issues with hypoglycemia or symptoms concerning for this.  Family history of diabetes includes multiple sisters and brothers.        Interval HPI:   Blood sugars mostly at goal  Patient made NPO overnight with concerns of hematochezia    /63 (BP Location: Left arm, Patient Position: Lying)   Pulse 89   Temp 99.9 °F (37.7 °C) (Oral)   Resp 16   Ht 5' 4" (1.626 m)   Wt 73.5 kg (162 lb 0.6 oz)   LMP  (LMP Unknown)   SpO2 95%   BMI 27.81 kg/m²     Labs Reviewed and Include    Recent Labs   Lab 03/16/23  0222   *   CALCIUM 9.0   ALBUMIN 1.9*   PROT 7.1      K 4.6   CO2 29      BUN 42*   CREATININE 1.0   ALKPHOS 81   ALT 27   AST 45*   BILITOT 0.6     Lab Results   Component Value Date    WBC 13.48 (H) 03/16/2023    HGB 7.7 (L) 03/16/2023    HCT 25.4 (L) 03/16/2023    MCV 86 03/16/2023     (H) 03/16/2023     No results for input(s): TSH, FREET4 in the last 168 hours.  Lab Results   Component Value Date    HGBA1C 8.2 (H) 02/28/2023       Nutritional status:   Body mass index is 27.81 kg/m².  Lab Results   Component Value Date    ALBUMIN 1.9 (L) 03/16/2023    ALBUMIN 1.8 (L) 03/16/2023    ALBUMIN 1.9 (L) 03/15/2023     No results found for: PREALBUMIN    Estimated Creatinine Clearance: 42.6 mL/min (based on SCr of 1 mg/dL).    Accu-Checks  Recent Labs     03/13/23  1607 03/13/23  1931 03/14/23  0834 03/14/23  1202 03/14/23  1506 03/14/23  2110 03/15/23  0900 03/15/23  1133 03/15/23  1609 03/15/23  2100   POCTGLUCOSE 182* 136* 174* 199* 145* 187* 228* 272* 158* 123*       Current Medications and/or " Treatments Impacting Glycemic Control  Immunotherapy:    Immunosuppressants       None          Steroids:   Hormones (From admission, onward)      Start     Stop Route Frequency Ordered    03/09/23 2100  melatonin tablet 6 mg         -- Oral Nightly 03/09/23 1546          Pressors:    Autonomic Drugs (From admission, onward)      None          Hyperglycemia/Diabetes Medications:   Antihyperglycemics (From admission, onward)      Start     Stop Route Frequency Ordered    03/13/23 1200  insulin detemir U-100 pen 12 Units         -- SubQ Daily 03/13/23 1057    03/13/23 1130  insulin aspart U-100 pen 4 Units         -- SubQ 3 times daily with meals 03/13/23 1058    03/12/23 0645  insulin aspart U-100 pen 0-5 Units         -- SubQ Before meals & nightly 03/11/23 1991            ASSESSMENT and PLAN    Cardiac/Vascular  * Critical limb ischemia with history of revascularization of same extremity  - Optimize blood glucose control to improve wound healing    Endocrine  Hypoglycemia  - no episodes in the last 24 hours  - caution with insulin titration considering age, kidney function and variable appetite     Type 2 diabetes mellitus with chronic kidney disease, with long-term current use of insulin  - Blood sugars mostly around goal in the last 24 hours, Variable appetite observed, but generally low appetite   - Levemir 12 units daily  - Aspart 4 units before meals  - Low dose correction scale insulin as needed  - Only give correction scale insulin for meal time when patient is NPO  - BG checks AC/HS and at 2 am  - Hypoglycemia protocol in place  - If blood glucose greater than 300, please ask patient not to eat food or drink anything other than water until correctional insulin has brought it back below 250  - Please notify Endocrine for any change and/or advance in diet    GI  BRBPR (bright red blood per rectum)  - management by gastroenterology team          Aditya Farrell MD  Endocrinology  [facility]

## 2023-03-16 NOTE — NURSING
Called to room around 0010 for report of pt vomiting. Upon entering room I assessed pt had large frothy completely bile bm with specks of bright red blood. Also found pt to have bloody red/brown vomit with mucous on gown. Pt c/o nausea. Immediately took pt's vitals and they were stable 130/82 with hr 93 temp 97.6 02 sat 97. Paged Dr. Kuo who is on for internal medicine who made pt npo, did labs (hg stable 8.3), and consulted GI.    After digging in pt's notes pt had positive occult blood 3/13 that was never passed on to me. Furthermore, Dr. Coello stated in his note from that day that pt has pasty bm and was going to consult gi in the AM. Unfortunately this was looked over.     Luis the OC was notified and pt was put on continous tele monitoring and 02 sat. Furthermore, Dr. Kuo instructed us to take vitals every hour for the next few hours to ensure there is no drop in bp.     Also called Dr. Esquivel? Intern on call for surgery and notified her that pt's leg seems more cool then previously, but that pulses are still able to be obtained with doppler. Notified her as well that pt has increase WOB but denies sob. Respiratory informed and asked to assess. No further orders.      Will continue to monitor closely.

## 2023-03-16 NOTE — PROGRESS NOTES
[facility]  Progress Note    Patient Name: Nelsy Marino  MRN: [id number]  Patient Class: IP- Inpatient   Admission Date: 2/27/2023  Length of Stay: 17 days  Attending Physician: Tanner Coello MD  Primary Care Provider: Juan F Garay        Subjective:     Principal Problem:Critical limb ischemia with history of revascularization of same extremity        HPI:  This is an 82-year-old female with a past medical history of severe PAD, hypertension, type 2 diabetes, history of DVT who presents with right foot pain.    Patient reports worsening right foot pain, swelling and discoloration that started after she had an ingrown toenail removed by her podiatrist.  She reports that her discoloration started since 2/7. Of note, the patient underwent an angiogram on 02/23 by vascular surgery. In the ED, she was tachycardic (112), and hypertensive.  Labs showed microcytic anemia (10.2), elevated sed rate (112), elevated CRP (56.5), elevated lactic acid (2.5).  Foot x-ray showed DJD and spurs on the calcaneus with no acute process.  Vascular surgery was consulted and recommended continuing antibiotics and evaluating in the morning.  She was given morphine 4 mg IV, vancomycin, Zosyn and was admitted for further management.      Overview/Hospital Course:   82-year-old female with a past medical history of severe PAD, hypertension, type 2 diabetes, history of DVT admitted on 02/27/2023 for gangrene of right toes. Of note, patient recent underwent LE angiogram by Dr. Tran on 02/23/23. Per OP report, was able to get successful revascularization / resolution of stenosis. XR right foot with no fracture, dislocation, bone destruction, or foreign body seen.  There is DJD and spurs on the calcaneus.  Was started on IV Zosyn and vancomycin on admit. Blood  cx with NGTD. On 02/28, patient with hypoglycemia but was asymptomatic. Improved with glucogen. Likely due to NPO status and was still receiving  insulin.  Cardiology consulted for preoperative clearance.  Recommended stress test.  Normal myocardial perfusion scan.  Cardiology deem patient at acceptable cardiac risk for vasc surgery/anesthesia.  Vascular surgery consulted and patient underwent fem fem bypass on 3/2/2023. Podiatry also consulted and patient underwent right TMA on 3/4/23.     Stepped down to Hospital Medicine service. O2 requirement steadily improving while on diuresis.  Patient notes significant posterior ankle pain. Increased tramadol and ordered robaxin. Good urine output. Patient continues to note sleep disturbances mainly due to BLE pain. Ordering transfusion for Hb 6.4. WBC continues to rise from 13 to 15. Per vascular surgery, continuing wound care for right TMA and no evidence of active infection at the surgical site. Per ID, continuing supportive care and diuresis and discontinuing IV zosyn. Also will obtain respiratory cultures, order C diff panel and followup CT chest.            Interval History: Patient lying in bed, no acute distress. Family at bedside. Continues to report BLE pain. Holding home Eliquis due to drop in H/H. Since 2 units pRBC, H/H has been stable. Patient tolerating meropenem and linezolid.         Intake/Output Summary (Last 24 hours) at 3/16/2023 0952  Last data filed at 3/16/2023 0415  Gross per 24 hour   Intake --   Output 1600 ml   Net -1600 ml           Review of Systems   Constitutional:  Positive for activity change and fatigue. Negative for chills and fever.   HENT:  Negative for congestion.    Eyes:  Negative for visual disturbance.   Respiratory:  Positive for cough and shortness of breath.    Cardiovascular:  Positive for leg swelling. Negative for chest pain.   Gastrointestinal:  Negative for abdominal distention, abdominal pain, nausea and vomiting.   Genitourinary:  Negative for dysuria.   Musculoskeletal:  Positive for arthralgias (right foot) and myalgias (right foot). Negative for back pain.    Skin:  Negative for rash and wound.   Neurological:  Positive for weakness. Negative for dizziness.   Psychiatric/Behavioral:  Negative for confusion.      Objective:     Vital Signs (Most Recent):  Temp: 99.9 °F (37.7 °C) (03/16/23 0755)  Pulse: 83 (03/16/23 0755)  Resp: 16 (03/16/23 0755)  BP: 125/63 (03/16/23 0755)  SpO2: 95 % (03/16/23 0755)   Vital Signs (24h Range):  Temp:  [98 °F (36.7 °C)-100.3 °F (37.9 °C)] 99.9 °F (37.7 °C)  Pulse:  [71-87] 83  Resp:  [16-18] 16  SpO2:  [90 %-99 %] 95 %  BP: (125-146)/(60-69) 125/63     Weight: 73.5 kg (162 lb 0.6 oz)  Body mass index is 27.81 kg/m².    Intake/Output Summary (Last 24 hours) at 3/16/2023 0952  Last data filed at 3/16/2023 0415  Gross per 24 hour   Intake --   Output 1600 ml   Net -1600 ml        Physical Exam  Constitutional:       Appearance: Normal appearance.   HENT:      Head: Normocephalic.      Mouth/Throat:      Mouth: Mucous membranes are moist.   Eyes:      Extraocular Movements: Extraocular movements intact.      Pupils: Pupils are equal, round, and reactive to light.   Neck:      Comments: JVD absent  Cardiovascular:      Rate and Rhythm: Normal rate and regular rhythm.      Pulses: Normal pulses.      Heart sounds: Normal heart sounds.   Pulmonary:      Effort: Respiratory distress present.      Breath sounds: Normal breath sounds.   Abdominal:      General: Bowel sounds are normal. There is no distension.      Palpations: Abdomen is soft.      Tenderness: There is no abdominal tenderness.   Musculoskeletal:         General: Normal range of motion.      Cervical back: Neck supple.      Right lower leg: Edema present.      Left lower leg: Edema present.      Comments: Right TMA with dressing c/d/I and surgical shoe in place   Neurological:      General: No focal deficit present.      Mental Status: She is alert.   Psychiatric:         Mood and Affect: Mood normal.         Cognition and Memory: Cognition is impaired. Memory is impaired.        Significant Labs: A1C:   Recent Labs   Lab 02/28/23  0549   HGBA1C 8.2*       Blood Culture:   No results for input(s): LABBLOO in the last 48 hours.    CBC:   Recent Labs   Lab 03/15/23  0640 03/16/23 0045 03/16/23 0222   WBC 15.71* 12.49 13.48*   HGB 8.0* 8.3* 7.7*   HCT 25.9* 26.9* 25.4*   * 516* 516*       CMP:   Recent Labs   Lab 03/15/23  0640 03/16/23 0045 03/16/23 0222    142 142   K 4.1 4.2 4.6    104 103   CO2 30* 29 29   * 159* 147*   BUN 48* 42* 42*   CREATININE 1.1 0.9 1.0   CALCIUM 9.0 8.9 9.0   PROT 7.4 7.3 7.1   ALBUMIN 1.9* 1.8* 1.9*   BILITOT 0.6 0.6 0.6   ALKPHOS 65 62 81   AST 54* 45* 45*   ALT 28 27 27   ANIONGAP 10 9 10       Coagulation:   Recent Labs   Lab 03/16/23 0222   APTT 37.8*       Lactic Acid:   No results for input(s): LACTATE in the last 48 hours.    Magnesium:   No results for input(s): MG in the last 48 hours.    POCT Glucose:   Recent Labs   Lab 03/15/23  1133 03/15/23  1609 03/15/23  2100   POCTGLUCOSE 272* 158* 123*           Significant Imaging: I have reviewed all pertinent imaging results/findings within the past 24 hours.      Assessment/Plan:      * Critical limb ischemia with history of revascularization of same extremity  -Vascular surgery consult. apprec recs  -- s/p fem fem bypass on 3/2/2023  -- s/p right TMA on 3/4/2023.   --No concern for surgical incision infection at this time.  -- low threshold to escalate antibiotics. If her graft becomes infected and requires antibiotics, she has no other options left for revascularizations. ID following  -- Q4H neurovasc checks  -- ASA/plavix/Statin/Eliquis. Hepatin gtt in replace of eliquis for now due to worsening anemia. Discontinued AC due to drop in Hb.   -- PT/OT  --Recommend PRBC transfusion  --Consider GI eval or imaging   --UA cultures negative, blood cultures no growth to date, wound cultures NGTD, cdiff pending.  -Podiatry consulted. apprec recs  --Dressing changes by  "nursing.  --RLE WB to heel in surgical shoe for transfers or short distances.   --Elevate RLE at all times while in bed or chair.  -- Continuing to monitor right TMA site. If foot worsens or no other source is found then may consider opening of incision and exploration of surgical wound   - outpatient podiatry wound care instructions when patient medically ready for discharge: Please order ambulatory referral to podiatry for outpatient followup. RLE WB to heel in surgical shoe for transfers or short distances. HH/facility dressing changes R foot 3x/week: cleanse incision with saline, apply betadine soaked adaptic, apply gauze, wrap with cast padding and coban.       Acute encephalopathy  - on 3/12, patient more somnolent and had tremors  - discontinued gabapentin and depakote  - Scr back to baseline so low suspicion for uremia   - continue antibiotic for possible infection   - RESOLVED      Leukocytosis  - see "sepsis"      S/P transmetatarsal amputation of foot, right  - stable  - appreciated vascular surgery and podiatry recs  -- s/p TMA 3/4  -- low threshold to escalate antibiotics. If her graft becomes infected and requires antibiotics, she has no other options left for revascularizations  -- Q4H neurovasc checks  -- ASA/plavix/Statin/Eliquis   -- PT/OT  --Dressing changes by nursing.  --RLE WB to heel in surgical shoe for transfers or short distances.   --Elevate RLE at all times while in bed or chair.  -- DC Instructions: Please order ambulatory referral to podiatry for outpatient followup. RLE WB to heel in surgical shoe for transfers or short distances. HH/facility dressing changes R foot 3x/week: cleanse incision with saline, apply betadine soaked adaptic, apply gauze, wrap with cast padding and coban.   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 Started linezolid      Hypoglycemia  Better now  Continue levemir and LDSS    Sepsis  -Right foot appears to have wet gangrene on exam. S/p right TMA on 3/4  - fever 100.5 on 3/9  - WBC risin --> 15 --> 18  - procal slightly elevated at 0.65  - ESR 64,   - per vascular surgery, right TMA does not appear infected  - per podiatry, if no other infectious source identified, will consider opening of incision and exploration of surgical wound.   - ID consulted. apprec recs  -- discontinued IV zosyn. Started linezolid to cover for SSTI  -- continue supportive care  -- blood culture NGTD  - stool sample for c diff panel could not be obtained   -- followup sputum culture, RIP  -- CT C/A/P negative for infection        History of DVT (deep vein thrombosis)  Not on AC outpatient.    - heparin gtt in replace of eliquis for now until Hb stable and no additional surgical intervention indicated  - was switched to eliquis but now holding AC on 3/11 due to drop in Hb. No evidence of active bleeding so will reassess H/H tomrrow and if stable will resume eliquis.    Acute hypoxemic respiratory failure  Patient with Hypercapnic and Hypoxic Respiratory failure which is Acute on chronic.  she is not on home oxygen. Supplemental oxygen was provided and noted-  .   Signs/symptoms of respiratory failure include- tachypnea, increased work of breathing and lethargy. Contributing diagnoses includes - CHF, Obesity Hypoventilation and Pneumonia Labs and images were reviewed. Patient Has recent ABG, which has been reviewed. Will treat underlying causes and adjust management of respiratory failure.    PLAN:  - increased from 2 L NC to 12 L NC on 3/78  - now weaned to 2 L NC  - CXR showed pulmonary edema with no consolidation  - switched to xoponex due to tremors on duonebs  - incentive spirometry   - mucinex and prn tessalon perles  - chest PT and acapella   - OOB to chair  - IMPROVING        Type 2 diabetes mellitus with  chronic kidney disease, with long-term current use of insulin  Stable from hypoglycemia standpoint; continue levemir and LDSSI      Essential hypertension  -Continue home medications:  Amlodipine 10 mg daily, hydrochlorothiazide 25 mg daily, losartan 100 mg daily      PVD (peripheral vascular disease)  Cont. home medications:  Aspirin, cilostazol  Consult vascular surgery: Rec transfer to [facility] for hybrid OR surgery 3/2/23 for RLE bypass tomorrow with Dr. Dunbar      VTE Risk Mitigation (From admission, onward)         Ordered     Reason for No Pharmacological VTE Prophylaxis  Once        Question:  Reasons:  Answer:  IV Heparin w/in 24 hrs. Pre or Post-Op    03/02/23 1918     IP VTE HIGH RISK PATIENT  Once         03/02/23 1918     Place sequential compression device  Until discontinued         02/27/23 2200                Discharge Planning   JUAN ALBERTO: 3/16/2023     Code Status: Full Code   Is the patient medically ready for discharge?: Yes    Reason for patient still in hospital (select all that apply): Patient unstable, Patient trending condition, Laboratory test, Treatment, Consult recommendations, PT / OT recommendations and Pending disposition  Discharge Plan A: Long-term acute care facility (LTAC)   Discharge Delays: None known at this time        Time spent in care of patient: > 35 minutes         Tanner Coello MD  Department of Hospital Medicine   [facility]

## 2023-03-16 NOTE — SUBJECTIVE & OBJECTIVE
"Interval HPI:   Blood sugars mostly at goal  Patient made NPO overnight with concerns of hematochezia    /63 (BP Location: Left arm, Patient Position: Lying)   Pulse 89   Temp 99.9 °F (37.7 °C) (Oral)   Resp 16   Ht 5' 4" (1.626 m)   Wt 73.5 kg (162 lb 0.6 oz)   LMP  (LMP Unknown)   SpO2 95%   BMI 27.81 kg/m²     Labs Reviewed and Include    Recent Labs   Lab 03/16/23  0222   *   CALCIUM 9.0   ALBUMIN 1.9*   PROT 7.1      K 4.6   CO2 29      BUN 42*   CREATININE 1.0   ALKPHOS 81   ALT 27   AST 45*   BILITOT 0.6     Lab Results   Component Value Date    WBC 13.48 (H) 03/16/2023    HGB 7.7 (L) 03/16/2023    HCT 25.4 (L) 03/16/2023    MCV 86 03/16/2023     (H) 03/16/2023     No results for input(s): TSH, FREET4 in the last 168 hours.  Lab Results   Component Value Date    HGBA1C 8.2 (H) 02/28/2023       Nutritional status:   Body mass index is 27.81 kg/m².  Lab Results   Component Value Date    ALBUMIN 1.9 (L) 03/16/2023    ALBUMIN 1.8 (L) 03/16/2023    ALBUMIN 1.9 (L) 03/15/2023     No results found for: PREALBUMIN    Estimated Creatinine Clearance: 42.6 mL/min (based on SCr of 1 mg/dL).    Accu-Checks  Recent Labs     03/13/23  1607 03/13/23  1931 03/14/23  0834 03/14/23  1202 03/14/23  1506 03/14/23  2110 03/15/23  0900 03/15/23  1133 03/15/23  1609 03/15/23  2100   POCTGLUCOSE 182* 136* 174* 199* 145* 187* 228* 272* 158* 123*       Current Medications and/or Treatments Impacting Glycemic Control  Immunotherapy:    Immunosuppressants       None          Steroids:   Hormones (From admission, onward)      Start     Stop Route Frequency Ordered    03/09/23 2100  melatonin tablet 6 mg         -- Oral Nightly 03/09/23 1546          Pressors:    Autonomic Drugs (From admission, onward)      None          Hyperglycemia/Diabetes Medications:   Antihyperglycemics (From admission, onward)      Start     Stop Route Frequency Ordered    03/13/23 1200  insulin detemir U-100 pen 12 Units    "      -- SubQ Daily 03/13/23 1057    03/13/23 1130  insulin aspart U-100 pen 4 Units         -- SubQ 3 times daily with meals 03/13/23 1058    03/12/23 0645  insulin aspart U-100 pen 0-5 Units         -- SubQ Before meals & nightly 03/11/23 6213

## 2023-03-16 NOTE — ASSESSMENT & PLAN NOTE
- BRBPR on 3/15  - holding ASA, Plavix, Eliquis   - positive stool occult   - s/p 2 units pRBC on 3/11. H/H stable since transfusion  - GI consulted. followup recs

## 2023-03-16 NOTE — PT/OT/SLP PROGRESS
Occupational Therapy   Treatment    Name: Nelsy Marino  MRN: [id number]  Admitting Diagnosis:  Critical limb ischemia with history of revascularization of same extremity  12 Days Post-Op    Recommendations:     Discharge Recommendations: nursing facility, skilled  Discharge Equipment Recommendations:  wheelchair, lift device, hospital bed  Barriers to discharge:  None    Assessment:     Nelsy Marino is a 82 y.o. female with a medical diagnosis of Critical limb ischemia with history of revascularization of same extremity. Pt t/fd from bed>chair with Mod A (2/2 plopping down w/o reach back). Unable to remain up in chair due to onset of nausea. Performance deficits affecting function are weakness, impaired endurance, impaired self care skills, impaired functional mobility, gait instability, impaired balance, decreased coordination, decreased lower extremity function, decreased safety awareness.     Rehab Prognosis:  Good; patient would benefit from acute skilled OT services to address these deficits and reach maximum level of function.       Plan:     Patient to be seen 3 x/week to address the above listed problems via self-care/home management, therapeutic activities, therapeutic exercises  Plan of Care Expires: 04/05/23  Plan of Care Reviewed with: patient, spouse    Subjective     Pain/Comfort:  Pain Rating 1: 0/10    Objective:     Communicated with: rn prior to session.  Patient found supine with peripheral IV, telemetry upon OT entry to room.    General Precautions: Standard, fall    Orthopedic Precautions:RLE weight bearing as tolerated  Braces:  (darco)  Respiratory Status: Room air     Occupational Performance:     Bed Mobility:    Patient completed Scooting/Bridging with stand by assistance  Patient completed Supine to Sit with stand by assistance  Patient completed Sit to Supine with stand by assistance     Functional Mobility/Transfers:  Patient completed Sit <> Stand Transfer with minimum assistance   with  rolling walker   Patient completed Bed <> Chair Transfer using Step Transfer technique with contact guard assistance and moderate assistance with rolling walker  Functional Mobility: Pt t/fd from bed>chair with Mod A (2/2 plopping down w/o reach back).     [facility] 6 Click ADL: 18    Treatment & Education:  From chair level, pt completed BUE/LE therex (x 10 reps each) including:  - elbow flexion/extension  - straight arm raises  - hor Abd/Add  - knee flexion/extension  - seated marching    Patient left supine with all lines intact and call button in reach    GOALS:   Multidisciplinary Problems       Occupational Therapy Goals          Problem: Occupational Therapy    Goal Priority Disciplines Outcome Interventions   Occupational Therapy Goal     OT, PT/OT Ongoing, Progressing    Description: Goals to be met by: 3/20/23     Patient will increase functional independence with ADLs by performing:    Feeding with [place].  UE Dressing with [place].  LE Dressing with Supervision.  Grooming while standing at sink with Stand-by Assistance.  Toileting from toilet with Stand-by Assistance for hygiene and clothing management.   Toilet transfer to toilet with Stand-by Assistance.                         Time Tracking:     OT Date of Treatment: 03/16/23  OT Start Time: 1039  OT Stop Time: 1108  OT Total Time (min): 29 min    Billable Minutes:Therapeutic Activity 15  Therapeutic Exercise 14    OT/GEORGE: OT          3/16/2023

## 2023-03-16 NOTE — ASSESSMENT & PLAN NOTE
Nelsy Marino is a 82 y.o. lady with severe PAD.    **Patient had episode of blood in stool, recommend consult GI/ scoping for source    Procedures this hospital stay:  3/4: Right TMA  3/8: L external iliac to R SFA bypass, L common and external iliac stenting    Medications:  -ASA/plavix/Statin/Eliquis were stopped due to blood in stool      **Recommend to restart these medications ASAP  -Abx: Merrem     Labs:   WBC increased slightly from yesterday  H&H decreased slightly from yesterday      Diet:  Diabetic    PT & OT:  Ordered & seeing patient    Nursing:  Assist with keeping groins dry  Q4 neurovascular checks, notify vascular if any changes    Wound Care:   Keep groins clean and dry, cover with clean gauze and tape  Change dressing once per shift       Disposition: SNF once stable for discharge   SW/CM Needs:  Assistance with SNF placement

## 2023-03-16 NOTE — PROGRESS NOTES
[facility] - [facility], [place]  Vascular Surgery  Progress Note    Patient Name: Nelsy Marino  MRN: [id number]  Admission Date: 2/27/2023  Primary Care Provider: Juan F Garay    Subjective:     Interval History: Patient had episode of BRBPR overnight, is tired this morning.  VS remained stable throughout.      Post-Op Info:  Procedure(s) (LRB):  AMPUTATION, FOOT, TRANSMETATARSAL (Right)   12 Days Post-Op       Medications:  Continuous Infusions:  Scheduled Meds:   amLODIPine  10 mg Oral Daily    atorvastatin  40 mg Oral QHS    dextromethorphan-guaiFENesin  mg  1 tablet Oral BID    DULoxetine  30 mg Oral BID    furosemide  40 mg Oral BID    insulin aspart U-100  0-5 Units Subcutaneous QID (AC & HS)    insulin aspart U-100  4 Units Subcutaneous TIDWM    insulin detemir U-100  12 Units Subcutaneous Daily    levalbuterol  1.25 mg Nebulization Q8H WA    melatonin  6 mg Oral Nightly    meropenem (MERREM) IVPB  2 g Intravenous Q12H    methocarbamoL  500 mg Oral QID     PRN Meds:sodium chloride, sodium chloride, acetaminophen, benzonatate, dextrose 10%, dextrose 10%, dextrose 10%, dextrose 10%, dextrose, dextrose, dextrose, dextrose, glucagon (human recombinant), glucagon (human recombinant), glucagon (human recombinant), HYDROmorphone, levalbuterol, naloxone, ondansetron, prochlorperazine, simethicone, sodium chloride 0.9%, traMADoL     Objective:     Vital Signs (Most Recent):  Temp: 98 °F (36.7 °C) (03/16/23 0526)  Pulse: 83 (03/16/23 0718)  Resp: 16 (03/16/23 0526)  BP: (!) 146/64 (03/16/23 0526)  SpO2: 96 % (03/16/23 0718)   Vital Signs (24h Range):  Temp:  [96 °F (35.6 °C)-100.3 °F (37.9 °C)] 98 °F (36.7 °C)  Pulse:  [71-87] 83  Resp:  [16-18] 16  SpO2:  [90 %-99 %] 96 %  BP: (120-146)/(58-69) 146/64         Physical Exam  Vitals reviewed.   Constitutional:       Appearance: Normal appearance.   HENT:      Mouth/Throat:      Mouth: Mucous membranes are moist.   Eyes:      Pupils: Pupils are equal, round, and  reactive to light.   Cardiovascular:      Rate and Rhythm: Normal rate and regular rhythm.   Pulmonary:      Effort: Pulmonary effort is normal.   Abdominal:      General: Abdomen is flat.      Palpations: Abdomen is soft.   Skin:     General: Skin is warm and dry.      Capillary Refill: Capillary refill takes less than 2 seconds.      Comments: R thigh incision well-appearing  L groin dressing- small portion of breakdown, no drainage   R foot TMA dressed per podiatry    Neurological:      General: No focal deficit present.      Mental Status: She is alert.   Psychiatric:         Mood and Affect: Mood normal.       Significant Labs:  CBC:   Recent Labs   Lab 03/16/23 0222   WBC 13.48*   RBC 2.95*   HGB 7.7*   HCT 25.4*   *   MCV 86   MCH 26.1*   MCHC 30.3*     CMP:   Recent Labs   Lab 03/16/23 0222   *   CALCIUM 9.0   ALBUMIN 1.9*   PROT 7.1      K 4.6   CO2 29      BUN 42*   CREATININE 1.0   ALKPHOS 81   ALT 27   AST 45*   BILITOT 0.6       Significant Diagnostics:  I have reviewed all pertinent imaging results/findings within the past 24 hours.    Assessment/Plan:     * Critical limb ischemia with history of revascularization of same extremity  Nelsy Marino is a 82 y.o. lady with severe PAD.    **Patient had episode of blood in stool, recommend consult GI/ scoping for source    Procedures this hospital stay:  3/4: Right TMA  3/8: L external iliac to R SFA bypass, L common and external iliac stenting    Medications:  -ASA/plavix/Statin/Eliquis were stopped due to blood in stool      **Recommend to restart these medications ASAP  -Abx: Merrem     Labs:   WBC increased slightly from yesterday  H&H decreased slightly from yesterday      Diet:  Diabetic    PT & OT:  Ordered & seeing patient    Nursing:  Assist with keeping groins dry  Q4 neurovascular checks, notify vascular if any changes    Wound Care:   Keep groins clean and dry, cover with clean gauze and tape  Change dressing once per  shift       Disposition: SNF once stable for discharge   SW/CM Needs:  Assistance with SNF placement                   Samantha Ledesma NP  Vascular Surgery  [facility]

## 2023-03-16 NOTE — SUBJECTIVE & OBJECTIVE
Medications:  Continuous Infusions:  Scheduled Meds:   amLODIPine  10 mg Oral Daily    atorvastatin  40 mg Oral QHS    dextromethorphan-guaiFENesin  mg  1 tablet Oral BID    DULoxetine  30 mg Oral BID    furosemide  40 mg Oral BID    insulin aspart U-100  0-5 Units Subcutaneous QID (AC & HS)    insulin aspart U-100  4 Units Subcutaneous TIDWM    insulin detemir U-100  12 Units Subcutaneous Daily    levalbuterol  1.25 mg Nebulization Q8H WA    melatonin  6 mg Oral Nightly    meropenem (MERREM) IVPB  2 g Intravenous Q12H    methocarbamoL  500 mg Oral QID     PRN Meds:sodium chloride, sodium chloride, acetaminophen, benzonatate, dextrose 10%, dextrose 10%, dextrose 10%, dextrose 10%, dextrose, dextrose, dextrose, dextrose, glucagon (human recombinant), glucagon (human recombinant), glucagon (human recombinant), HYDROmorphone, levalbuterol, naloxone, ondansetron, prochlorperazine, simethicone, sodium chloride 0.9%, traMADoL     Objective:     Vital Signs (Most Recent):  Temp: 98 °F (36.7 °C) (03/16/23 0526)  Pulse: 83 (03/16/23 0718)  Resp: 16 (03/16/23 0526)  BP: (!) 146/64 (03/16/23 0526)  SpO2: 96 % (03/16/23 0718)   Vital Signs (24h Range):  Temp:  [96 °F (35.6 °C)-100.3 °F (37.9 °C)] 98 °F (36.7 °C)  Pulse:  [71-87] 83  Resp:  [16-18] 16  SpO2:  [90 %-99 %] 96 %  BP: (120-146)/(58-69) 146/64         Physical Exam  Vitals reviewed.   Constitutional:       Appearance: Normal appearance.   HENT:      Mouth/Throat:      Mouth: Mucous membranes are moist.   Eyes:      Pupils: Pupils are equal, round, and reactive to light.   Cardiovascular:      Rate and Rhythm: Normal rate and regular rhythm.   Pulmonary:      Effort: Pulmonary effort is normal.   Abdominal:      General: Abdomen is flat.      Palpations: Abdomen is soft.   Skin:     General: Skin is warm and dry.      Capillary Refill: Capillary refill takes less than 2 seconds.      Comments: R thigh incision well-appearing  L groin dressing- small portion of  breakdown, no drainage   R foot TMA dressed per podiatry    Neurological:      General: No focal deficit present.      Mental Status: She is alert.   Psychiatric:         Mood and Affect: Mood normal.       Significant Labs:  CBC:   Recent Labs   Lab 03/16/23 0222   WBC 13.48*   RBC 2.95*   HGB 7.7*   HCT 25.4*   *   MCV 86   MCH 26.1*   MCHC 30.3*     CMP:   Recent Labs   Lab 03/16/23 0222   *   CALCIUM 9.0   ALBUMIN 1.9*   PROT 7.1      K 4.6   CO2 29      BUN 42*   CREATININE 1.0   ALKPHOS 81   ALT 27   AST 45*   BILITOT 0.6       Significant Diagnostics:  I have reviewed all pertinent imaging results/findings within the past 24 hours.

## 2023-03-16 NOTE — ASSESSMENT & PLAN NOTE
- Blood sugars mostly around goal in the last 24 hours, Variable appetite observed, but generally low appetite   - Levemir 12 units daily  - Aspart 4 units before meals  - Low dose correction scale insulin as needed  - Only give correction scale insulin for meal time when patient is NPO  - BG checks AC/HS and at 2 am  - Hypoglycemia protocol in place  - If blood glucose greater than 300, please ask patient not to eat food or drink anything other than water until correctional insulin has brought it back below 250  - Please notify Endocrine for any change and/or advance in diet

## 2023-03-16 NOTE — SIGNIFICANT EVENT
Notified by nurse of Large frothy, completely bile BM with BRBPR  Hold Apixiban (?discontinue, DVT in 2019, multiple?)  Hold ASA and Plavix (severe PAD)  NPO  Stool studies  Check Hb level  GI consult in am        Hector Kuo MD  Internal Medicine Staff

## 2023-03-17 PROBLEM — K92.1 MELENA: Status: ACTIVE | Noted: 2023-03-17

## 2023-03-17 PROBLEM — K92.2 UPPER GI BLEED: Status: ACTIVE | Noted: 2023-03-16

## 2023-03-17 LAB
ALBUMIN SERPL BCP-MCNC: 1.8 G/DL (ref 3.5–5.2)
ALP SERPL-CCNC: 70 U/L (ref 55–135)
ALT SERPL W/O P-5'-P-CCNC: 22 U/L (ref 10–44)
ANION GAP SERPL CALC-SCNC: 9 MMOL/L (ref 8–16)
AST SERPL-CCNC: 36 U/L (ref 10–40)
BASOPHILS # BLD AUTO: 0.02 K/UL (ref 0–0.2)
BASOPHILS # BLD AUTO: 0.02 K/UL (ref 0–0.2)
BASOPHILS NFR BLD: 0.2 % (ref 0–1.9)
BASOPHILS NFR BLD: 0.2 % (ref 0–1.9)
BILIRUB SERPL-MCNC: 0.5 MG/DL (ref 0.1–1)
BUN SERPL-MCNC: 36 MG/DL (ref 8–23)
CALCIUM SERPL-MCNC: 8.9 MG/DL (ref 8.7–10.5)
CHLORIDE SERPL-SCNC: 105 MMOL/L (ref 95–110)
CO2 SERPL-SCNC: 30 MMOL/L (ref 23–29)
CREAT SERPL-MCNC: 0.8 MG/DL (ref 0.5–1.4)
DIFFERENTIAL METHOD: ABNORMAL
DIFFERENTIAL METHOD: ABNORMAL
EOSINOPHIL # BLD AUTO: 0.2 K/UL (ref 0–0.5)
EOSINOPHIL # BLD AUTO: 0.2 K/UL (ref 0–0.5)
EOSINOPHIL NFR BLD: 1.6 % (ref 0–8)
EOSINOPHIL NFR BLD: 1.6 % (ref 0–8)
ERYTHROCYTE [DISTWIDTH] IN BLOOD BY AUTOMATED COUNT: 15.6 % (ref 11.5–14.5)
ERYTHROCYTE [DISTWIDTH] IN BLOOD BY AUTOMATED COUNT: 15.6 % (ref 11.5–14.5)
EST. GFR  (NO RACE VARIABLE): >60 ML/MIN/1.73 M^2
GLUCOSE SERPL-MCNC: 147 MG/DL (ref 70–110)
HCT VFR BLD AUTO: 24 % (ref 37–48.5)
HCT VFR BLD AUTO: 24.5 % (ref 37–48.5)
HGB BLD-MCNC: 7.1 G/DL (ref 12–16)
HGB BLD-MCNC: 7.4 G/DL (ref 12–16)
IMM GRANULOCYTES # BLD AUTO: 0.06 K/UL (ref 0–0.04)
IMM GRANULOCYTES # BLD AUTO: 0.08 K/UL (ref 0–0.04)
IMM GRANULOCYTES NFR BLD AUTO: 0.5 % (ref 0–0.5)
IMM GRANULOCYTES NFR BLD AUTO: 0.6 % (ref 0–0.5)
INR PPP: 1.4 (ref 0.8–1.2)
LYMPHOCYTES # BLD AUTO: 0.9 K/UL (ref 1–4.8)
LYMPHOCYTES # BLD AUTO: 0.9 K/UL (ref 1–4.8)
LYMPHOCYTES NFR BLD: 6.9 % (ref 18–48)
LYMPHOCYTES NFR BLD: 7.3 % (ref 18–48)
MCH RBC QN AUTO: 25.6 PG (ref 27–31)
MCH RBC QN AUTO: 26.1 PG (ref 27–31)
MCHC RBC AUTO-ENTMCNC: 29.6 G/DL (ref 32–36)
MCHC RBC AUTO-ENTMCNC: 30.2 G/DL (ref 32–36)
MCV RBC AUTO: 87 FL (ref 82–98)
MCV RBC AUTO: 87 FL (ref 82–98)
MONOCYTES # BLD AUTO: 0.5 K/UL (ref 0.3–1)
MONOCYTES # BLD AUTO: 0.5 K/UL (ref 0.3–1)
MONOCYTES NFR BLD: 4 % (ref 4–15)
MONOCYTES NFR BLD: 4.5 % (ref 4–15)
NEUTROPHILS # BLD AUTO: 10.4 K/UL (ref 1.8–7.7)
NEUTROPHILS # BLD AUTO: 11.2 K/UL (ref 1.8–7.7)
NEUTROPHILS NFR BLD: 85.9 % (ref 38–73)
NEUTROPHILS NFR BLD: 86.7 % (ref 38–73)
NRBC BLD-RTO: 0 /100 WBC
NRBC BLD-RTO: 0 /100 WBC
PLATELET # BLD AUTO: 456 K/UL (ref 150–450)
PLATELET # BLD AUTO: 521 K/UL (ref 150–450)
PMV BLD AUTO: 9.7 FL (ref 9.2–12.9)
PMV BLD AUTO: 9.9 FL (ref 9.2–12.9)
POCT GLUCOSE: 145 MG/DL (ref 70–110)
POCT GLUCOSE: 152 MG/DL (ref 70–110)
POCT GLUCOSE: 156 MG/DL (ref 70–110)
POTASSIUM SERPL-SCNC: 4.2 MMOL/L (ref 3.5–5.1)
PROT SERPL-MCNC: 6.7 G/DL (ref 6–8.4)
PROTHROMBIN TIME: 14.2 SEC (ref 9–12.5)
RBC # BLD AUTO: 2.77 M/UL (ref 4–5.4)
RBC # BLD AUTO: 2.83 M/UL (ref 4–5.4)
SODIUM SERPL-SCNC: 144 MMOL/L (ref 136–145)
WBC # BLD AUTO: 12.11 K/UL (ref 3.9–12.7)
WBC # BLD AUTO: 12.91 K/UL (ref 3.9–12.7)

## 2023-03-17 PROCEDURE — 99900035 HC TECH TIME PER 15 MIN (STAT)

## 2023-03-17 PROCEDURE — 27000646 HC AEROBIKA DEVICE

## 2023-03-17 PROCEDURE — 97530 THERAPEUTIC ACTIVITIES: CPT

## 2023-03-17 PROCEDURE — 25000003 PHARM REV CODE 250: Performed by: INTERNAL MEDICINE

## 2023-03-17 PROCEDURE — 85025 COMPLETE CBC W/AUTO DIFF WBC: CPT | Performed by: INTERNAL MEDICINE

## 2023-03-17 PROCEDURE — 25000242 PHARM REV CODE 250 ALT 637 W/ HCPCS: Performed by: INTERNAL MEDICINE

## 2023-03-17 PROCEDURE — 97530 THERAPEUTIC ACTIVITIES: CPT | Mod: CQ

## 2023-03-17 PROCEDURE — 94668 MNPJ CHEST WALL SBSQ: CPT

## 2023-03-17 PROCEDURE — C9113 INJ PANTOPRAZOLE SODIUM, VIA: HCPCS | Performed by: INTERNAL MEDICINE

## 2023-03-17 PROCEDURE — 27100171 HC OXYGEN HIGH FLOW UP TO 24 HOURS

## 2023-03-17 PROCEDURE — 20600001 HC STEP DOWN PRIVATE ROOM

## 2023-03-17 PROCEDURE — 25000003 PHARM REV CODE 250: Performed by: STUDENT IN AN ORGANIZED HEALTH CARE EDUCATION/TRAINING PROGRAM

## 2023-03-17 PROCEDURE — 27000221 HC OXYGEN, UP TO 24 HOURS

## 2023-03-17 PROCEDURE — 94761 N-INVAS EAR/PLS OXIMETRY MLT: CPT

## 2023-03-17 PROCEDURE — 99233 PR SUBSEQUENT HOSPITAL CARE,LEVL III: ICD-10-PCS | Mod: ,,, | Performed by: INTERNAL MEDICINE

## 2023-03-17 PROCEDURE — 85610 PROTHROMBIN TIME: CPT | Performed by: INTERNAL MEDICINE

## 2023-03-17 PROCEDURE — 85025 COMPLETE CBC W/AUTO DIFF WBC: CPT | Mod: 91 | Performed by: STUDENT IN AN ORGANIZED HEALTH CARE EDUCATION/TRAINING PROGRAM

## 2023-03-17 PROCEDURE — 36415 COLL VENOUS BLD VENIPUNCTURE: CPT | Performed by: INTERNAL MEDICINE

## 2023-03-17 PROCEDURE — 94640 AIRWAY INHALATION TREATMENT: CPT

## 2023-03-17 PROCEDURE — 63600175 PHARM REV CODE 636 W HCPCS: Performed by: INTERNAL MEDICINE

## 2023-03-17 PROCEDURE — 97535 SELF CARE MNGMENT TRAINING: CPT

## 2023-03-17 PROCEDURE — 80053 COMPREHEN METABOLIC PANEL: CPT | Performed by: STUDENT IN AN ORGANIZED HEALTH CARE EDUCATION/TRAINING PROGRAM

## 2023-03-17 PROCEDURE — 94664 DEMO&/EVAL PT USE INHALER: CPT

## 2023-03-17 PROCEDURE — 99233 SBSQ HOSP IP/OBS HIGH 50: CPT | Mod: ,,, | Performed by: INTERNAL MEDICINE

## 2023-03-17 RX ORDER — CEPHALEXIN 500 MG/1
1000 CAPSULE ORAL EVERY 12 HOURS
Start: 2023-03-24 | End: 2023-03-20 | Stop reason: SDUPTHER

## 2023-03-17 RX ORDER — METHOCARBAMOL 500 MG/1
500 TABLET, FILM COATED ORAL 4 TIMES DAILY
Qty: 40 TABLET | Refills: 0
Start: 2023-03-17 | End: 2023-03-20 | Stop reason: SDUPTHER

## 2023-03-17 RX ORDER — ATORVASTATIN CALCIUM 40 MG/1
40 TABLET, FILM COATED ORAL NIGHTLY
Qty: 90 TABLET | Refills: 3
Start: 2023-03-17 | End: 2023-03-20 | Stop reason: SDUPTHER

## 2023-03-17 RX ORDER — PANTOPRAZOLE SODIUM 40 MG/10ML
40 INJECTION, POWDER, LYOPHILIZED, FOR SOLUTION INTRAVENOUS 2 TIMES DAILY
Status: DISCONTINUED | OUTPATIENT
Start: 2023-03-17 | End: 2023-03-20 | Stop reason: HOSPADM

## 2023-03-17 RX ORDER — CIPROFLOXACIN 500 MG/1
500 TABLET ORAL EVERY 12 HOURS
Start: 2023-03-17 | End: 2023-03-20 | Stop reason: SDUPTHER

## 2023-03-17 RX ORDER — CLOPIDOGREL BISULFATE 75 MG/1
75 TABLET ORAL DAILY
Status: DISCONTINUED | OUTPATIENT
Start: 2023-03-18 | End: 2023-03-20 | Stop reason: HOSPADM

## 2023-03-17 RX ORDER — SIMETHICONE 80 MG
80 TABLET,CHEWABLE ORAL 4 TIMES DAILY PRN
Refills: 0
Start: 2023-03-17 | End: 2023-03-20 | Stop reason: HOSPADM

## 2023-03-17 RX ORDER — ASPIRIN 81 MG/1
81 TABLET ORAL DAILY
Status: DISCONTINUED | OUTPATIENT
Start: 2023-03-18 | End: 2023-03-20 | Stop reason: HOSPADM

## 2023-03-17 RX ORDER — TRAMADOL HYDROCHLORIDE 50 MG/1
50 TABLET ORAL EVERY 6 HOURS PRN
Start: 2023-03-17 | End: 2023-03-20 | Stop reason: HOSPADM

## 2023-03-17 RX ORDER — INSULIN ASPART 100 [IU]/ML
4 INJECTION, SOLUTION INTRAVENOUS; SUBCUTANEOUS 3 TIMES DAILY
Refills: 0
Start: 2023-03-17 | End: 2023-03-20 | Stop reason: SDUPTHER

## 2023-03-17 RX ORDER — BENZONATATE 100 MG/1
100 CAPSULE ORAL 3 TIMES DAILY PRN
Start: 2023-03-17 | End: 2023-03-20 | Stop reason: SDUPTHER

## 2023-03-17 RX ORDER — DEXTROSE 40 %
15 GEL (GRAM) ORAL
Status: DISCONTINUED | OUTPATIENT
Start: 2023-03-17 | End: 2023-03-20 | Stop reason: HOSPADM

## 2023-03-17 RX ORDER — DULOXETIN HYDROCHLORIDE 30 MG/1
30 CAPSULE, DELAYED RELEASE ORAL 2 TIMES DAILY
Qty: 60 CAPSULE | Refills: 11
Start: 2023-03-17 | End: 2023-03-20 | Stop reason: SDUPTHER

## 2023-03-17 RX ORDER — FUROSEMIDE 40 MG/1
40 TABLET ORAL 2 TIMES DAILY
Qty: 60 TABLET | Refills: 11
Start: 2023-03-17 | End: 2023-03-20 | Stop reason: HOSPADM

## 2023-03-17 RX ORDER — CLOPIDOGREL BISULFATE 75 MG/1
75 TABLET ORAL DAILY
Qty: 30 TABLET | Refills: 11
Start: 2023-03-18 | End: 2023-03-20 | Stop reason: SDUPTHER

## 2023-03-17 RX ADMIN — METHOCARBAMOL 500 MG: 500 TABLET ORAL at 09:03

## 2023-03-17 RX ADMIN — CIPROFLOXACIN 500 MG: 500 TABLET, FILM COATED ORAL at 08:03

## 2023-03-17 RX ADMIN — FUROSEMIDE 40 MG: 40 TABLET ORAL at 08:03

## 2023-03-17 RX ADMIN — DULOXETINE 30 MG: 30 CAPSULE, DELAYED RELEASE ORAL at 09:03

## 2023-03-17 RX ADMIN — Medication 6 MG: at 09:03

## 2023-03-17 RX ADMIN — ATORVASTATIN CALCIUM 40 MG: 40 TABLET, FILM COATED ORAL at 09:03

## 2023-03-17 RX ADMIN — PANTOPRAZOLE SODIUM 40 MG: 40 INJECTION, POWDER, FOR SOLUTION INTRAVENOUS at 06:03

## 2023-03-17 RX ADMIN — INSULIN ASPART 4 UNITS: 100 INJECTION, SOLUTION INTRAVENOUS; SUBCUTANEOUS at 08:03

## 2023-03-17 RX ADMIN — GUAIFENESIN AND DEXTROMETHORPHAN HYDROBROMIDE 1 TABLET: 600; 30 TABLET, EXTENDED RELEASE ORAL at 08:03

## 2023-03-17 RX ADMIN — CIPROFLOXACIN 500 MG: 500 TABLET, FILM COATED ORAL at 09:03

## 2023-03-17 RX ADMIN — APIXABAN 5 MG: 5 TABLET, FILM COATED ORAL at 09:03

## 2023-03-17 RX ADMIN — METHOCARBAMOL 500 MG: 500 TABLET ORAL at 08:03

## 2023-03-17 RX ADMIN — METHOCARBAMOL 500 MG: 500 TABLET ORAL at 12:03

## 2023-03-17 RX ADMIN — AMLODIPINE BESYLATE 10 MG: 10 TABLET ORAL at 09:03

## 2023-03-17 RX ADMIN — DULOXETINE 30 MG: 30 CAPSULE, DELAYED RELEASE ORAL at 08:03

## 2023-03-17 RX ADMIN — APIXABAN 5 MG: 5 TABLET, FILM COATED ORAL at 08:03

## 2023-03-17 RX ADMIN — INSULIN DETEMIR 12 UNITS: 100 INJECTION, SOLUTION SUBCUTANEOUS at 08:03

## 2023-03-17 RX ADMIN — LEVALBUTEROL 1.25 MG: 1.25 SOLUTION, CONCENTRATE RESPIRATORY (INHALATION) at 08:03

## 2023-03-17 RX ADMIN — FUROSEMIDE 40 MG: 40 TABLET ORAL at 06:03

## 2023-03-17 RX ADMIN — PANTOPRAZOLE SODIUM 40 MG: 40 INJECTION, POWDER, FOR SOLUTION INTRAVENOUS at 12:03

## 2023-03-17 RX ADMIN — CLOPIDOGREL BISULFATE 75 MG: 75 TABLET ORAL at 08:03

## 2023-03-17 RX ADMIN — METHOCARBAMOL 500 MG: 500 TABLET ORAL at 06:03

## 2023-03-17 RX ADMIN — ASPIRIN 81 MG: 81 TABLET, COATED ORAL at 08:03

## 2023-03-17 NOTE — SUBJECTIVE & OBJECTIVE
Interval History: Patient lying in bed, no acute distress. Family at bedside. Overnight, she had episode of BRBPR. H/H stable and hemodynamically stable. GI consulted and recommend outpatient followup since patient stable. GI deferred resuming AC/AP to vascular surgery and vascular surgery recommending resuming the medication ASAP. BLE pain controlled today. Tolerating  home Eliquis since H/H remains stable. Insurance not covering LTAC so patient and family electing for HH. Will monitor for additional signs of bleeding overnight and if patient stable, will discharge with HH tomorrow.         Intake/Output Summary (Last 24 hours) at 3/16/2023 1948  Last data filed at 3/16/2023 0415  Gross per 24 hour   Intake --   Output 650 ml   Net -650 ml           Review of Systems   Constitutional:  Positive for activity change and fatigue. Negative for chills and fever.   HENT:  Negative for congestion.    Eyes:  Negative for visual disturbance.   Respiratory:  Positive for cough and shortness of breath.    Cardiovascular:  Positive for leg swelling. Negative for chest pain.   Gastrointestinal:  Negative for abdominal distention, abdominal pain, nausea and vomiting.   Genitourinary:  Negative for dysuria.   Musculoskeletal:  Positive for arthralgias (right foot) and myalgias (right foot). Negative for back pain.   Skin:  Negative for rash and wound.   Neurological:  Positive for weakness. Negative for dizziness.   Psychiatric/Behavioral:  Negative for confusion.      Objective:     Vital Signs (Most Recent):  Temp: 99.8 °F (37.7 °C) (03/16/23 1555)  Pulse: 82 (03/16/23 1631)  Resp: 18 (03/16/23 1631)  BP: 132/60 (03/16/23 1555)  SpO2: (!) 94 % (03/16/23 1631)   Vital Signs (24h Range):  Temp:  [98 °F (36.7 °C)-99.9 °F (37.7 °C)] 99.8 °F (37.7 °C)  Pulse:  [71-89] 82  Resp:  [16-18] 18  SpO2:  [93 %-99 %] 94 %  BP: (125-146)/(60-69) 132/60     Weight: 73.5 kg (162 lb 0.6 oz)  Body mass index is 27.81 kg/m².    Intake/Output  Summary (Last 24 hours) at 3/16/2023 1948  Last data filed at 3/16/2023 0415  Gross per 24 hour   Intake --   Output 650 ml   Net -650 ml        Physical Exam  Constitutional:       Appearance: Normal appearance.   HENT:      Head: Normocephalic.      Mouth/Throat:      Mouth: Mucous membranes are moist.   Eyes:      Extraocular Movements: Extraocular movements intact.      Pupils: Pupils are equal, round, and reactive to light.   Neck:      Comments: JVD absent  Cardiovascular:      Rate and Rhythm: Normal rate and regular rhythm.      Pulses: Normal pulses.      Heart sounds: Normal heart sounds.   Pulmonary:      Effort: Respiratory distress present.      Breath sounds: Normal breath sounds.   Abdominal:      General: Bowel sounds are normal. There is no distension.      Palpations: Abdomen is soft.      Tenderness: There is no abdominal tenderness.   Musculoskeletal:         General: Normal range of motion.      Cervical back: Neck supple.      Right lower leg: Edema present.      Left lower leg: Edema present.      Comments: Right TMA with dressing c/d/I and surgical shoe in place   Neurological:      General: No focal deficit present.      Mental Status: She is alert.   Psychiatric:         Mood and Affect: Mood normal.         Cognition and Memory: Cognition is impaired. Memory is impaired.       Significant Labs: A1C:   Recent Labs   Lab 02/28/23  0549   HGBA1C 8.2*       Blood Culture:   No results for input(s): LABBLOO in the last 48 hours.    CBC:   Recent Labs   Lab 03/16/23  0045 03/16/23  0222 03/16/23  1354   WBC 12.49 13.48* 14.39*   HGB 8.3* 7.7* 7.7*   HCT 26.9* 25.4* 25.1*   * 516* 485*       CMP:   Recent Labs   Lab 03/15/23  0640 03/16/23  0045 03/16/23 0222    142 142   K 4.1 4.2 4.6    104 103   CO2 30* 29 29   * 159* 147*   BUN 48* 42* 42*   CREATININE 1.1 0.9 1.0   CALCIUM 9.0 8.9 9.0   PROT 7.4 7.3 7.1   ALBUMIN 1.9* 1.8* 1.9*   BILITOT 0.6 0.6 0.6   ALKPHOS 65 62  81   AST 54* 45* 45*   ALT 28 27 27   ANIONGAP 10 9 10       Coagulation:   Recent Labs   Lab 03/16/23  0222   APTT 37.8*       Lactic Acid:   No results for input(s): LACTATE in the last 48 hours.    Magnesium:   No results for input(s): MG in the last 48 hours.    POCT Glucose:   Recent Labs   Lab 03/15/23  2100 03/16/23  1108 03/16/23  1520   POCTGLUCOSE 123* 140* 154*           Significant Imaging: I have reviewed all pertinent imaging results/findings within the past 24 hours.

## 2023-03-17 NOTE — PLAN OF CARE
Valentin Lopez - [facility]      HOME HEALTH ORDERS  FACE TO FACE ENCOUNTER    Patient Name: Nelsy Marino  YOB: 1940    PCP: Juan F Garay   PCP Address: [address] [place] [address] / [place]  PCP Phone Number: [phone]  PCP Fax: [phone]    Encounter Date: 2/27/23    Admit to Home Health    Diagnoses:  Active Hospital Problems    Diagnosis  POA    *Critical limb ischemia with history of revascularization of same extremity [I70.229, Z98.890]  Not Applicable    Bilious vomiting [R11.14]  No    BRBPR (bright red blood per rectum) [K62.5]  No    Diabetic foot infection [E11.628, L08.9]  Yes    Acute encephalopathy [G93.40]  No    Leukocytosis [D72.829]  No    S/P transmetatarsal amputation of foot, right [Z89.431]  Not Applicable    Vascular graft infection [T82.7XXA]  No    Hyponatremia [E87.1]  Yes    Hypoglycemia [E16.2]  No    Sepsis [A41.9]  Yes    PVD (peripheral vascular disease) [I73.9]  Yes     Chronic    History of DVT (deep vein thrombosis) [Z86.718]  Not Applicable     Chronic    Essential hypertension [I10]  Yes     Chronic    Type 2 diabetes mellitus with chronic kidney disease, with long-term current use of insulin [E11.22, Z79.4]  Not Applicable    Acute hypoxemic respiratory failure [J96.01]  No      Resolved Hospital Problems    Diagnosis Date Resolved POA    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    Current Facility-Administered Medications   Medication Dose Route Frequency Provider Last Rate Last  Admin    0.9%  NaCl infusion (for blood administration)   Intravenous Q24H PRN Buster Walker MD        0.9%  NaCl infusion (for blood administration)   Intravenous Q24H PRN Tanner Coello MD        acetaminophen tablet 650 mg  650 mg Oral Q6H PRN Tanner Coello MD   650 mg at 03/15/23 0045    amLODIPine tablet 10 mg  10 mg Oral Daily Ramana Lopez MD   10 mg at 03/17/23 0900    apixaban tablet 5 mg  5 mg Oral BID Tanner Coello MD   5 mg at 03/17/23 0823    aspirin EC tablet 81 mg  81 mg Oral Daily Tanner Coello MD   81 mg at 03/17/23 0823    atorvastatin tablet 40 mg  40 mg Oral QHS Demarco Wright MD   40 mg at 03/16/23 2149    benzonatate capsule 100 mg  100 mg Oral TID PRN Tanner Coello MD        [START ON 3/24/2023] cephALEXin capsule 1,000 mg  1,000 mg Oral Q12H Tanner Coello MD        ciprofloxacin HCl tablet 500 mg  500 mg Oral Q12H Tanner Coello MD   500 mg at 03/17/23 0822    clopidogreL tablet 75 mg  75 mg Oral Daily Tanner Coello MD   75 mg at 03/17/23 0822    dextrose 10% bolus 125 mL 125 mL  12.5 g Intravenous PRN Kashmir Madrigal, DO        dextrose 10% bolus 250 mL 250 mL  25 g Intravenous PRN Kashmir Madrigal, DO        dextrose 40 % gel 15,000 mg  15 g Oral PRN Tanner Coello MD        dextrose 40 % gel 30,000 mg  30 g Oral PRN Kashmir Madrigal, DO        DULoxetine DR capsule 30 mg  30 mg Oral BID Tanner Coello MD   30 mg at 03/17/23 0823    furosemide tablet 40 mg  40 mg Oral BID Tanner Coello MD   40 mg at 03/17/23 0823    glucagon (human recombinant) injection 1 mg  1 mg Intramuscular PRN Kashmir Madrigal,         HYDROmorphone injection 0.2 mg  0.2 mg Intravenous Q4H PRN GREGORY Coronel MD   0.2 mg at 03/15/23 0225    insulin aspart U-100 pen 0-5 Units  0-5 Units Subcutaneous QID (AC & HS) Kashmir Madrigal DO   3 Units at 03/15/23 1134    insulin aspart U-100 pen 4 Units  4 Units Subcutaneous TIDWM Aditya Farrell MD   4 Units at 03/17/23 0883     insulin detemir U-100 pen 12 Units  12 Units Subcutaneous Daily Aditya Farrell MD   12 Units at 03/17/23 0825    levalbuterol nebulizer solution 1.25 mg  1.25 mg Nebulization Q8H PRN Tanner Coello MD   1.25 mg at 03/16/23 1631    melatonin tablet 6 mg  6 mg Oral Nightly Tanner Coello MD   6 mg at 03/16/23 2149    methocarbamoL tablet 500 mg  500 mg Oral QID Tanner Coello MD   500 mg at 03/17/23 1215    naloxone 0.4 mg/mL injection 0.02 mg  0.02 mg Intravenous PRN Ramana Lopez MD   0.02 mg at 03/08/23 1852    ondansetron injection 4 mg  4 mg Intravenous Q8H PRN Ramana Lopez MD   4 mg at 03/16/23 0037    pantoprazole injection 40 mg  40 mg Intravenous BID loop Tanner Coello MD   40 mg at 03/17/23 1215    prochlorperazine injection Soln 5 mg  5 mg Intravenous Q6H PRN Ramana Lopez MD        simethicone chewable tablet 80 mg  1 tablet Oral QID PRN Ramana Lopez MD        sodium chloride 0.9% flush 10 mL  10 mL Intravenous Q12H PRN Ramana Lopez MD        traMADoL tablet 50 mg  50 mg Oral Q6H PRN Tanner Coello MD   50 mg at 03/15/23 0410     Current Discharge Medication List        START taking these medications    Details   apixaban (ELIQUIS) 5 mg Tab Take 1 tablet (5 mg total) by mouth 2 (two) times daily.      atorvastatin (LIPITOR) 40 MG tablet Take 1 tablet (40 mg total) by mouth every evening.  Qty: 90 tablet, Refills: 3      benzonatate (TESSALON) 100 MG capsule Take 1 capsule (100 mg total) by mouth 3 (three) times daily as needed for Cough.      cephALEXin (KEFLEX) 500 MG capsule Take 2 capsules (1,000 mg total) by mouth every 12 (twelve) hours.      ciprofloxacin HCl (CIPRO) 500 MG tablet Take 1 tablet (500 mg total) by mouth every 12 (twelve) hours.      clopidogreL (PLAVIX) 75 mg tablet Take 1 tablet (75 mg total) by mouth once daily.  Qty: 30 tablet, Refills: 11      DULoxetine (CYMBALTA) 30 MG capsule Take 1 capsule (30 mg total) by mouth 2 (two) times daily.  Qty: 60 capsule, Refills:  "11      furosemide (LASIX) 40 MG tablet Take 1 tablet (40 mg total) by mouth 2 (two) times daily.  Qty: 60 tablet, Refills: 11      insulin aspart U-100 (NOVOLOG) 100 unit/mL (3 mL) InPn pen Inject 4 Units into the skin 3 (three) times daily.  Refills: 0      insulin detemir U-100 (LEVEMIR FLEXTOUCH) 100 unit/mL (3 mL) SubQ InPn pen Inject 12 Units into the skin once daily.  Refills: 0      methocarbamoL (ROBAXIN) 500 MG Tab Take 1 tablet (500 mg total) by mouth 4 (four) times daily. for 10 days  Qty: 40 tablet, Refills: 0      simethicone (MYLICON) 80 MG chewable tablet Take 1 tablet (80 mg total) by mouth 4 (four) times daily as needed for Flatulence.  Refills: 0      traMADoL (ULTRAM) 50 mg tablet Take 1 tablet (50 mg total) by mouth every 6 (six) hours as needed for Pain.    Comments: Quantity prescribed more than 7 day supply? No           CONTINUE these medications which have NOT CHANGED    Details   carvediloL (COREG) 12.5 MG tablet Take 12.5 mg by mouth 2 (two) times daily.      amlodipine (NORVASC) 10 MG tablet Take 10 mg by mouth once daily.      aspirin (ECOTRIN) 81 MG EC tablet Take 81 mg by mouth once daily.      BLOOD SUGAR DIAGNOSTIC (TRUE METRIX GLUCOSE TEST STRIP MISC) by Misc.(Non-Drug; Combo Route) route.      glipiZIDE (GLUCOTROL) 10 MG tablet Take 10 mg by mouth 2 (two) times daily before meals.      insulin syringe-needle,dispos. 0.3 mL 30 gauge x 5/16" Syrg by Misc.(Non-Drug; Combo Route) route.      losartan-hydrochlorothiazide 100-25 mg (HYZAAR) 100-25 mg per tablet Take 1 tablet by mouth once daily.      metformin (GLUCOPHAGE) 1000 MG tablet Take 1,000 mg by mouth 2 (two) times daily with meals.      ondansetron (ZOFRAN) 8 MG tablet Refills: 0           STOP taking these medications       cilostazol (PLETAL) 50 MG Tab Comments:   Reason for Stopping:         insulin aspart protamine-insulin aspart (NOVOLOG 70/30) 100 unit/mL (70-30) InPn pen Comments:   Reason for Stopping:         insulin " aspart protamine-insulin aspart (NOVOLOG 70/30) 100 unit/mL (70-30) InPn pen Comments:   Reason for Stopping:                 I have seen and examined this patient within the last 30 days. My clinical findings that support the need for the home health skilled services and home bound status are the following:no   Weakness/numbness causing balance and gait disturbance due to Heart Failure, Infection, Weakness/Debility, and Anemia making it taxing to leave home.     Diet:   cardiac diet and diabetic diet 2000 calorie    Labs:  SN to perform labs:  CBC: Weekly; 2 week(s) and CMP: Weekly; 2 week(s) and Report Lab results to PCP.    Referrals/ Consults  Physical Therapy to evaluate and treat. Evaluate for home safety and equipment needs; Establish/upgrade home exercise program. Perform / instruct on therapeutic exercises, gait training, transfer training, and Range of Motion.  Occupational Therapy to evaluate and treat. Evaluate home environment for safety and equipment needs. Perform/Instruct on transfers, ADL training, ROM, and therapeutic exercises.    Activities:   activity as tolerated    Nursing:   Agency to admit patient within 24 hours of hospital discharge unless specified on physician order or at patient request    SN to complete comprehensive assessment including routine vital signs. Instruct on disease process and s/s of complications to report to MD. Review/verify medication list sent home with the patient at time of discharge  and instruct patient/caregiver as needed. Frequency may be adjusted depending on start of care date.     Skilled nurse to perform up to 3 visits PRN for symptoms related to diagnosis    Notify MD if SBP > 160 or < 90; DBP > 90 or < 50; HR > 120 or < 50; Temp > 101; O2 < 88%    Ok to schedule additional visits based on staff availability and patient request on consecutive days within the home health episode.    When multiple disciplines ordered:    Start of Care occurs on Sunday -  Wednesday schedule remaining discipline evaluations as ordered on separate consecutive days following the start of care.    Thursday SOC -schedule subsequent evaluations Friday and Monday the following week.     Friday - Saturday SOC - schedule subsequent discipline evaluations on consecutive days starting Monday of the following week.    For all post-discharge communication and subsequent orders please contact patient's primary care physician. If unable to reach primary care physician or do not receive response within 30 minutes, please contact hospitalist on-call for clinical staff order clarification    Miscellaneous   Routine Skin for Bedridden Patients: Instruct patient/caregiver to apply moisture barrier cream to all skin folds and wet areas in perineal area daily and after baths and all bowel movements.  Diabetic Care:   SN to perform and educate Diabetic management with blood glucose monitoring:, Fingerstick blood sugar AC and HS, and Report CBG < 60 or > 350 to physician.  Heart Failure:      SN to instruct on the following:    Instruct on the definition of CHF.   Instruct on the signs/sympoms of CHF to be reported.   Instruct on and monitor daily weights.   Instruct on factors that cause exacerbation.   Instruct on action, dose, schedule, and side effects of medications.   Instruct on diet as prescribed.   Instruct on activity allowed.   Instruct on life-style modifications for life long management of CHF   SN to assess compliance with daily weights, diet, medications, fluid retention,    safety precautions, activities permitted and life-style modifications.   Additional 1-2 SN visits per week as needed for signs and symptoms     of CHF exacerbation.      For Weight Gain > 2-3 lbs in 1 day or 4-6 lbs over 1 week notify PCP.    Home Oxygen:  Oxygen at 2 L/min nasal canula to be used:  Continuously.    Home Health Aide:  Nursing Three times weekly, Physical Therapy Three times weekly, and Occupational  Therapy Three times weekly    Wound Care Orders  Cleanse incision with saline,Pack wound along central aspect, apply to incision with betadine soaked adaptic,  apply gauze, wrap foot with kerlix and ACE wrap loosely    I certify that this patient is confined to her home and needs intermittent skilled nursing care, physical therapy, and occupational therapy.

## 2023-03-17 NOTE — ASSESSMENT & PLAN NOTE
Nelsy Marino is a 82 y.o. lady with severe PAD.    Procedures this hospital stay:  3/4: Right TMA  3/8: L external iliac to R SFA bypass, L common and external iliac stenting    Medications:  -ASA/plavix/Statin/Eliquis were stopped due to blood in stool      **Recommend to restart these medications ASAP  -Abx: Merrem     Labs:   H&H stable at this time  GI consulted for concern for bleeding; no intervention warranted      Diet:  Diabetic    PT & OT:  Ordered & seeing patient    Nursing:  Assist with keeping groins dry  Q4 neurovascular checks, notify vascular if any changes    Wound Care:   Keep groins clean and dry, cover with clean gauze and tape  Change dressing once per shift       Disposition: SNF once stable for discharge   SW/CM Needs:  Assistance with SNF placement

## 2023-03-17 NOTE — ASSESSMENT & PLAN NOTE
"Cont. home medications:  Aspirin, cilostazol  Consult vascular surgery: Rec transfer to [facility] for hybrid [place] surgery 3/2/23 for RLE bypass tomorrow with Dr. Dunbar  - see "critical limb ischemia"  "

## 2023-03-17 NOTE — PROGRESS NOTES
[facility] Medicine  Progress Note    Patient Name: Nelsy Marino  MRN: [id number]  Patient Class: IP- Inpatient   Admission Date: 2/27/2023  Length of Stay: 17 days  Attending Physician: Tanner Coello MD  Primary Care Provider: Juan F Garay        Subjective:     Principal Problem:Critical limb ischemia with history of revascularization of same extremity        HPI:  This is an 82-year-old female with a past medical history of severe PAD, hypertension, type 2 diabetes, history of DVT who presents with right foot pain.    Patient reports worsening right foot pain, swelling and discoloration that started after she had an ingrown toenail removed by her podiatrist.  She reports that her discoloration started since 2/7. Of note, the patient underwent an angiogram on 02/23 by vascular surgery. In the ED, she was tachycardic (112), and hypertensive.  Labs showed microcytic anemia (10.2), elevated sed rate (112), elevated CRP (56.5), elevated lactic acid (2.5).  Foot x-ray showed DJD and spurs on the calcaneus with no acute process.  Vascular surgery was consulted and recommended continuing antibiotics and evaluating in the morning.  She was given morphine 4 mg IV, vancomycin, Zosyn and was admitted for further management.      Overview/Hospital Course:   82-year-old female with a past medical history of severe PAD, hypertension, type 2 diabetes, history of DVT admitted on 02/27/2023 for gangrene of right toes. Of note, patient recent underwent LE angiogram by Dr. Tran on 02/23/23. Per OP report, was able to get successful revascularization / resolution of stenosis. XR right foot with no fracture, dislocation, bone destruction, or foreign body seen.  There is DJD and spurs on the calcaneus.  Was started on IV Zosyn and vancomycin on admit. Blood  cx with NGTD. On 02/28, patient with hypoglycemia but was asymptomatic. Improved with glucogen. Likely due to NPO status and was still receiving  insulin.  Cardiology consulted for preoperative clearance.  Recommended stress test.  Normal myocardial perfusion scan.  Cardiology deem patient at acceptable cardiac risk for vasc surgery/anesthesia.  Vascular surgery consulted and patient underwent fem fem bypass on 3/2/2023. Podiatry also consulted and patient underwent right TMA on 3/4/23.     Stepped down to Hospital Medicine service. O2 requirement steadily improving while on diuresis.  Patient notes significant posterior ankle pain. Increased tramadol and ordered robaxin. Good urine output. Patient continues to note sleep disturbances mainly due to BLE pain. Ordering transfusion for Hb 6.4. WBC continues to rise from 13 to 15. Per vascular surgery, continuing wound care for right TMA and no evidence of active infection at the surgical site. Per ID, continuing supportive care and diuresis and discontinuing IV zosyn. Also will obtain respiratory cultures, order C diff panel and followup CT chest.            Interval History: Patient lying in bed, no acute distress. Family at bedside. Overnight, she had episode of BRBPR. H/H stable and hemodynamically stable. GI consulted and recommend outpatient followup since patient stable. GI deferred resuming AC/AP to vascular surgery and vascular surgery recommending resuming the medication ASAP. BLE pain controlled today. Tolerating  home Eliquis since H/H remains stable. Insurance not covering LTAC so patient and family electing for HH. Will monitor for additional signs of bleeding overnight and if patient stable, will discharge with HH tomorrow.         Intake/Output Summary (Last 24 hours) at 3/16/2023 1948  Last data filed at 3/16/2023 0415  Gross per 24 hour   Intake --   Output 650 ml   Net -650 ml           Review of Systems   Constitutional:  Positive for activity change and fatigue. Negative for chills and fever.   HENT:  Negative for congestion.    Eyes:  Negative for visual disturbance.   Respiratory:  Positive  for cough and shortness of breath.    Cardiovascular:  Positive for leg swelling. Negative for chest pain.   Gastrointestinal:  Negative for abdominal distention, abdominal pain, nausea and vomiting.   Genitourinary:  Negative for dysuria.   Musculoskeletal:  Positive for arthralgias (right foot) and myalgias (right foot). Negative for back pain.   Skin:  Negative for rash and wound.   Neurological:  Positive for weakness. Negative for dizziness.   Psychiatric/Behavioral:  Negative for confusion.      Objective:     Vital Signs (Most Recent):  Temp: 99.8 °F (37.7 °C) (03/16/23 1555)  Pulse: 82 (03/16/23 1631)  Resp: 18 (03/16/23 1631)  BP: 132/60 (03/16/23 1555)  SpO2: (!) 94 % (03/16/23 1631)   Vital Signs (24h Range):  Temp:  [98 °F (36.7 °C)-99.9 °F (37.7 °C)] 99.8 °F (37.7 °C)  Pulse:  [71-89] 82  Resp:  [16-18] 18  SpO2:  [93 %-99 %] 94 %  BP: (125-146)/(60-69) 132/60     Weight: 73.5 kg (162 lb 0.6 oz)  Body mass index is 27.81 kg/m².    Intake/Output Summary (Last 24 hours) at 3/16/2023 1948  Last data filed at 3/16/2023 0415  Gross per 24 hour   Intake --   Output 650 ml   Net -650 ml        Physical Exam  Constitutional:       Appearance: Normal appearance.   HENT:      Head: Normocephalic.      Mouth/Throat:      Mouth: Mucous membranes are moist.   Eyes:      Extraocular Movements: Extraocular movements intact.      Pupils: Pupils are equal, round, and reactive to light.   Neck:      Comments: JVD absent  Cardiovascular:      Rate and Rhythm: Normal rate and regular rhythm.      Pulses: Normal pulses.      Heart sounds: Normal heart sounds.   Pulmonary:      Effort: Respiratory distress present.      Breath sounds: Normal breath sounds.   Abdominal:      General: Bowel sounds are normal. There is no distension.      Palpations: Abdomen is soft.      Tenderness: There is no abdominal tenderness.   Musculoskeletal:         General: Normal range of motion.      Cervical back: Neck supple.      Right lower  leg: Edema present.      Left lower leg: Edema present.      Comments: Right TMA with dressing c/d/I and surgical shoe in place   Neurological:      General: No focal deficit present.      Mental Status: She is alert.   Psychiatric:         Mood and Affect: Mood normal.         Cognition and Memory: Cognition is impaired. Memory is impaired.       Significant Labs: A1C:   Recent Labs   Lab 02/28/23  0549   HGBA1C 8.2*       Blood Culture:   No results for input(s): LABBLOO in the last 48 hours.    CBC:   Recent Labs   Lab 03/16/23  0045 03/16/23 0222 03/16/23  1354   WBC 12.49 13.48* 14.39*   HGB 8.3* 7.7* 7.7*   HCT 26.9* 25.4* 25.1*   * 516* 485*       CMP:   Recent Labs   Lab 03/15/23  0640 03/16/23 0045 03/16/23 0222    142 142   K 4.1 4.2 4.6    104 103   CO2 30* 29 29   * 159* 147*   BUN 48* 42* 42*   CREATININE 1.1 0.9 1.0   CALCIUM 9.0 8.9 9.0   PROT 7.4 7.3 7.1   ALBUMIN 1.9* 1.8* 1.9*   BILITOT 0.6 0.6 0.6   ALKPHOS 65 62 81   AST 54* 45* 45*   ALT 28 27 27   ANIONGAP 10 9 10       Coagulation:   Recent Labs   Lab 03/16/23 0222   APTT 37.8*       Lactic Acid:   No results for input(s): LACTATE in the last 48 hours.    Magnesium:   No results for input(s): MG in the last 48 hours.    POCT Glucose:   Recent Labs   Lab 03/15/23  2100 03/16/23  1108 03/16/23  1520   POCTGLUCOSE 123* 140* 154*           Significant Imaging: I have reviewed all pertinent imaging results/findings within the past 24 hours.      Assessment/Plan:      * Critical limb ischemia with history of revascularization of same extremity  -Vascular surgery consult. apprec recs  -- s/p fem fem bypass on 3/2/2023  -- s/p right TMA on 3/4/2023.   --No concern for surgical incision infection at this time.  -- low threshold to escalate antibiotics. If her graft becomes infected and requires antibiotics, she has no other options left for revascularizations. ID following  -- Q4H neurovasc checks  --  "ASA/plavix/Statin/Eliquis. Hepatin gtt in replace of eliquis for now due to worsening anemia. Discontinued AC due to drop in Hb.   -- PT/OT  --Recommend PRBC transfusion  --Consider GI eval or imaging   --UA cultures negative, blood cultures no growth to date, wound cultures NGTD, cdiff pending.  -Podiatry consulted. apprec recs  --Dressing changes by nursing.  --RLE WB to heel in surgical shoe for transfers or short distances.   --Elevate RLE at all times while in bed or chair.  -- Continuing to monitor right TMA site. If foot worsens or no other source is found then may consider opening of incision and exploration of surgical wound   - outpatient podiatry wound care instructions when patient medically ready for discharge: Please order ambulatory referral to podiatry for outpatient followup. RLE WB to heel in surgical shoe for transfers or short distances. HH/facility dressing changes R foot 3x/week: cleanse incision with saline, apply betadine soaked adaptic, apply gauze, wrap with cast padding and coban.       BRBPR (bright red blood per rectum)  - BRBPR on 3/15  - holding ASA, Plavix, Eliquis   - positive stool occult   - s/p 2 units pRBC on 3/11. H/H stable since transfusion  - GI consulted. apprec recs  -- Hgb currently stable without tenderness, or symptom reoccurrence.   -- Clear liquids for now   -- Will f/u with patient regarding sxs reoccurrence  - No current indicated for EGD at this time          Bilious vomiting  - reports regular bowel movements  - bili normal, AST/ALT normal   - clear liquid diet      Acute encephalopathy  - on 3/12, patient more somnolent and had tremors  - discontinued gabapentin and depakote  - Scr back to baseline so low suspicion for uremia   - continue antibiotic for possible infection   - RESOLVED      Leukocytosis  - see "sepsis"  - IMPROVING      S/P transmetatarsal amputation of foot, right  - stable  - appreciated vascular surgery and podiatry recs  -- s/p TMA 3/4  -- low " threshold to escalate antibiotics. If her graft becomes infected and requires antibiotics, she has no other options left for revascularizations  -- Q4H neurovasc checks  -- ASA/plavix/Statin/Eliquis   -- PT/OT  --Dressing changes by nursing.  --RLE WB to heel in surgical shoe for transfers or short distances.   --Elevate RLE at all times while in bed or chair.  -- DC Instructions: Please order ambulatory referral to podiatry for outpatient followup. RLE WB to heel in surgical shoe for transfers or short distances. HH/facility dressing changes R foot 3x/week: cleanse incision with saline, apply betadine soaked adaptic, apply gauze, wrap with cast padding and coban.   - scheduled tylenol and prn tramadol      Vascular graft infection  - on chronic suppression with keflex  - escalated to zosyn due to increased oxygen requirement to cover to possible PNA  - discontinued zosyn per ID. Started linezolid      Hypoglycemia  Better now  Continue levemir and LDSS    Sepsis  -Right foot appears to have wet gangrene on exam. S/p right TMA on 3/4  - fever 100.5 on 3/9  - WBC risin --> 15 --> 18  - procal slightly elevated at 0.65  - ESR 64,   - per vascular surgery, right TMA does not appear infected  - per podiatry, if no other infectious source identified, will consider opening of incision and exploration of surgical wound.   - ID consulted. apprec recs  -- discontinued IV zosyn. Started linezolid to cover for SSTI  -- continue supportive care  -- blood culture NGTD  - stool sample for c diff panel could not be obtained   -- followup sputum culture, RIP  -- CT C/A/P negative for infection        History of DVT (deep vein thrombosis)  Not on AC outpatient.    - heparin gtt in replace of eliquis for now until Hb stable and no additional surgical intervention indicated  - was switched to eliquis but now holding AC on 3/11 due to drop in Hb. No evidence of active bleeding so will reassess H/H tomrrow and if stable will  resume eliquis.    Acute hypoxemic respiratory failure  Patient with Hypercapnic and Hypoxic Respiratory failure which is Acute on chronic.  she is not on home oxygen. Supplemental oxygen was provided and noted-  .   Signs/symptoms of respiratory failure include- tachypnea, increased work of breathing and lethargy. Contributing diagnoses includes - CHF, Obesity Hypoventilation and Pneumonia Labs and images were reviewed. Patient Has recent ABG, which has been reviewed. Will treat underlying causes and adjust management of respiratory failure.    PLAN:  - increased from 2 L NC to 12 L NC on 3/78  - now weaned to 2 L NC  - CXR showed pulmonary edema with no consolidation  - switched to xoponex due to tremors on duonebs  - incentive spirometry   - mucinex and prn tessalon perles  - chest PT and acapella   - OOB to chair  - IMPROVING        Type 2 diabetes mellitus with chronic kidney disease, with long-term current use of insulin  Stable from hypoglycemia standpoint; continue levemir and LDSSI      Essential hypertension  -Continue home medications:  Amlodipine 10 mg daily, hydrochlorothiazide 25 mg daily, losartan 100 mg daily      PVD (peripheral vascular disease)  Cont. home medications:  Aspirin, cilostazol  Consult vascular surgery: Rec transfer to [facility] for hybrid OR surgery 3/2/23 for RLE bypass tomorrow with Dr. Dunbar      VTE Risk Mitigation (From admission, onward)         Ordered     apixaban tablet 5 mg  2 times daily         03/16/23 0959     Reason for No Pharmacological VTE Prophylaxis  Once        Question:  Reasons:  Answer:  IV Heparin w/in 24 hrs. Pre or Post-Op    03/02/23 1918     IP VTE HIGH RISK PATIENT  Once         03/02/23 1918     Place sequential compression device  Until discontinued         02/27/23 2200                Discharge Planning   JUAN ALBERTO: 3/17/2023     Code Status: Full Code   Is the patient medically ready for discharge?: Yes    Reason for patient still in hospital (select all that  apply): Patient trending condition, Laboratory test, Treatment, Consult recommendations, PT / OT recommendations and Pending disposition  Discharge Plan A: Long-term acute care facility (LTAC)   Discharge Delays: None known at this time        Time spent in care of patient: > 35 minutes         Tanner Coello MD  Department of Hospital Medicine   [facility]     abdominal pain RUQ pain on July 3rd 2021, now no pain

## 2023-03-17 NOTE — ASSESSMENT & PLAN NOTE
- BRBPR on 3/15  - holding ASA, Plavix, Eliquis   - positive stool occult   - s/p 2 units pRBC on 3/11. H/H stable since transfusion  - GI consulted. apprec recs  -- Hgb currently stable without tenderness, or symptom reoccurrence.   -- Clear liquids for now   -- Will f/u with patient regarding sxs reoccurrence  - No current indicated for EGD at this time

## 2023-03-17 NOTE — TREATMENT PLAN
BRIEF GI TREATMENT PLAN    Hb dropped at 8>7.7>7.1 today. Melena this AM. Had solid food at 8:30 AM.     Vitals:    03/17/23 0316 03/17/23 0325 03/17/23 0400 03/17/23 0716   BP:  (!) 121/51  (!) 131/59   BP Location:    Left arm   Patient Position:    Lying   Pulse: 85 86 86 85   Resp:  18 18 18   Temp:  98.2 °F (36.8 °C)  97 °F (36.1 °C)   TempSrc:    Axillary   SpO2: (!) 92% (!) 94% (!) 94%    Weight:       Height:          Physical Exam  Constitutional:       General: She is awake. She is not in acute distress.     Appearance: She is not toxic-appearing or diaphoretic.   HENT:      Head: Normocephalic and atraumatic.   Cardiovascular:      Rate and Rhythm: Normal rate and regular rhythm.      Pulses: Normal pulses.      Heart sounds: Murmur heard.   Pulmonary:      Effort: Pulmonary effort is normal. No respiratory distress.      Breath sounds: Normal breath sounds. No wheezing.   Abdominal:      General: Abdomen is flat. There is no distension.      Palpations: Abdomen is soft. There is no mass.      Tenderness: There is no abdominal tenderness. There is no guarding.   Rectal: Melena +nt  Neurological:      General: No focal deficit present.      Mental Status: She is alert, oriented to person, place, and time and easily aroused.   Psychiatric:         Mood and Affect: Mood normal.         Behavior: Behavior normal. Behavior is cooperative.   Assessment/Plan:      82-year-old lady with an extensive past medical history including recent transmetatarsal amputation and SSA artery bypass (currently on aspirin, Plavix and Eliquis) on whom GI has been consulted for melena. Hb down from 9s>7. VSS.     #melena   #bilious vomiting      PLAN:  - Plan for EGD if bleeding continues  - Trend Hgb q8hrs. Transfuse for Hgb <7, unless otherwise indicated  - Maintain IV access with 2 large bore Ivs  - Intravascular resuscitation/support with IVFs   - Clear liquids today, NPO at midnight.   - Given recent SFA bypass, may have to  continue anticoagulation since risks of stopping outweigh benefits.   - IV pantoprazole 40mg BID  - Please correct any coagulopathy with platelets and FFP for goal of platelets >50K and INR <2.0  - Please notify GI team if there is significant change in patient's clinical status   Mckay Moe MD  PGY-IV, GI

## 2023-03-17 NOTE — PLAN OF CARE
Endocrinology following for    Type 2 diabetes mellitus with chronic kidney disease, with long-term current use of insulin  - Blood sugars mostly around goal in the last 24 hours  - Recommend to continue Levemir 12 units daily, Aspart 4 units before meals with Low dose correction scale insulin as needed  - Give correction scale insulin only for meal time when patient is NPO  - BG checks AC/HS and at 2 am  - Hypoglycemia protocol in place  - If blood glucose greater than 300, please ask patient not to eat food or drink anything other than water until correctional insulin has brought it back below 250

## 2023-03-17 NOTE — PHYSICIAN QUERY
PT Name: Nelsy Marino  MR #: [id number]     DOCUMENTATION CLARIFICATION     CDS/: Jayne Tai RN              Contact information: [email or web address]  This form is a permanent document in the medical record.    Query Date: March 17, 2023    By submitting this query, we are merely seeking further clarification of documentation.  Please utilize your independent clinical judgment when addressing the question(s) below.    The Medical Record contains the following:   Indicator Supporting Clinical Findings Location in Medical Record    Kidney (Renal) Insufficiency     x Kidney (Renal) Failure/Injury Vancomycin serum concentration assessment(s):   Patient looks to have an active LOLY  SCr increase >0.3 in less than 24 hours.      Resuming oral lasix since LOLY resolved.      Type 2 diabetes mellitus with chronic kidney disease, with long-term current use of insulin     PN 3/1 Pharmacy          PN 3/12        PN 3/14               Nephrotoxic Agents      BUN/Creatinine           GFR BUN 18 19 26 (H) 22 16 19   Cr 1.2 1.3 1.6 (H) 1.2 1.0 0.9   GFR 45 ! 41 ! 32 ! 45.2 ! 56.2 ! >60.0     BUN 29 (H) 33 (H) 40 (H) 41 (H)   Cr 1.4 1.2 1.5 (H) 1.5 (H)   eGFR 37.6 ! 45.2 ! 34.6 ! 34.6 !     BUN 46 (H) 54 (H) 57 (H) 63 (H) 58 (H)   Cr 1.4 1.5 (H) 1.2 1.5 (H) 1.4   eGFR 37.6 ! 34.6 ! 45.2 ! 34.6 ! 37.6 !     Lab 2/27- 3/4            Lab 3/6- 3/9            Lab 3/10- 3/14              Urine: Casts         Eosinophils      Dehydration      Nausea/Vomiting      Dialysis/CRRT     x Treatment Holding  IV lasix for now due to rising Cr and bicarb.    PN 3/11  HM   x Other - Scr back to baseline so low suspicion for uremia PN 3/12  HM [facility] approved diagnostic criteria for acute kidney injury is based on KDIGO criteria:    An increase in serum creatinine > 0.3mg/dl within 48 hours  OR  Increase in serum creatinine to > 1.5x baseline, which is known or presumed to have occurred within the prior 7  days  OR  Urine volume <0.5 ml/kg/hr for 6 hours       The clinical guidelines noted above are only a system guideline. It does not replace the providers clinical judgment.     Provider, please specify the diagnosis or diagnoses associated with above clinical findings.     [  X  ] LOLY on CKD stage 3a  -(- Mildly to moderately decreased eGFR 45-59     [    ] Other (please specify): _______________________________           Please document in your progress notes daily for the duration of treatment until resolved and include in your discharge summary.    References:   [employer] Clinical Practice Guideline for Acute Kidney Injury. (2012, March). Retrieved October 21, 2020, from https://kdigo.org/wp-content/uploads/2016/10/EOXXK-5742-GCK-Guideline-English.pdf    FAUZIA Sharp MD, MATTHEW Godinez MD, & GAVIOTA Oakes MD. (1960). Renal medullary necrosis [Abstract]. The American Journal of Medicine, 29(1), 132-156. Doi:https://www.sciencedirect.com/science/article/abs/pii/9349216714440660    GAVIOTA Mcelroy MD, & ISIDRO Hassan MD, MS. (2020, June 18). Definition and staging of chronic kidney disease in adults ([id number] MATTHEW Craig MD, ScD & [id number] CLAUDE Sesay MD, MSc, Eds.). Retrieved October 21, 2020, from https://www.[employer]/contents/definition-and-staging-of-chronic-kidney-disease-in-adults?search=ckd%20staging&source=search_result&selectedTitle=1~150&usage_type=default&display_rank=1     TENZIN Vasquez MD, FACP. (2015, Dee Dee 15). Acute kidney injury revisited. Retrieved October 21, 2020, from https://acphospitalist.org/archives/2015/06/coding-acute-kidney-injury.htm    JONO Hines MD. (2019, July). Renal Cortical Necrosis. Retrieved October 21, 2020, from https://www.[employer]/professional/genitourinary-disorders/renovascular-disorders/renal-cortical-necrosis    Form No. 49217

## 2023-03-17 NOTE — PLAN OF CARE
Problem: Occupational Therapy  Goal: Occupational Therapy Goal  Description: Goals to be met by: 3/20/23     Patient will increase functional independence with ADLs by performing:    Feeding with [place].  UE Dressing with [place].  LE Dressing with Supervision.  Grooming while standing at sink with Stand-by Assistance.  Toileting from toilet with Stand-by Assistance for hygiene and clothing management.   Toilet transfer to toilet with Stand-by Assistance.    Outcome: Ongoing, Progressing     Goals remain appropriate. Cont POC.

## 2023-03-17 NOTE — ASSESSMENT & PLAN NOTE
- episodes of melena while on Eliquis and DAPT  - s/p 2 units pRBC on 3/11 for Hb 5.9  - GI consulted. apprec recs  -- EGD scheduled for 3/18  -- Clear liquids today, NPO at midnight.   -- Given recent SFA bypass, may have to continue anticoagulation since risks of stopping outweigh benefits.   -- IV pantoprazole 40mg BID  -- Please correct any coagulopathy with platelets and FFP for goal of platelets >50K and INR <2.0

## 2023-03-17 NOTE — ASSESSMENT & PLAN NOTE
-Vascular surgery consult. apprec recs  -- s/p fem fem bypass on 3/2/2023  -- s/p right TMA on 3/4/2023.   -ID consulted. apprec recs  -- switched Meropenem to ciprofloxacin 500 mg q12 hours to finish total 14 days of therapy for pseudomonas fluorescens SSTI. EOT 3/23/23. After this point, patient can resume her cephalexin 1000 mg BID Ppx regimen for chronic suppression of prior graft infection.   -- Q4H neurovasc checks  -- ASA/plavix/Statin/Eliquis. Continuing AC and DAPT in the setting of GIB due to recent bypass  -- PT/OT  --Recommend PRBC transfusion  --Consider GI eval or imaging   --UA cultures negative, blood cultures no growth to date, wound cultures NGTD, cdiff pending.  -Podiatry consulted. apprec recs  --Dressing changes by nursing.  --RLE WB to heel in surgical shoe for transfers or short distances.   --Elevate RLE at all times while in bed or chair.  -- Continuing to monitor right TMA site. If foot worsens or no other source is found then may consider opening of incision and exploration of surgical wound   - outpatient podiatry wound care instructions when patient medically ready for discharge: Please order ambulatory referral to podiatry for outpatient followup. RLE WB to heel in surgical shoe for transfers or short distances. HH/facility dressing changes R foot 3x/week: cleanse incision with saline, apply betadine soaked adaptic, apply gauze, wrap with cast padding and coban.

## 2023-03-17 NOTE — SUBJECTIVE & OBJECTIVE
Interval History: Patient lying in bed, no acute distress. Family at bedside. This morning, patient has episode of melena. Patient HD stable but Hb dropped from 7.7 to 7.1. GI notified and recommending EGD on 3/18. Clear liquid diet today and NPO at MN. Holding DAPT and continuing eliquis for now. PT/OT recommending SNF but family prefers HH since insurance will not cover LTAC. Will discharge home with HH once patient is medically stable.       Review of Systems   Constitutional:  Positive for activity change and fatigue. Negative for chills and fever.   HENT:  Negative for congestion.    Eyes:  Negative for visual disturbance.   Respiratory:  Positive for cough and shortness of breath.    Cardiovascular:  Positive for leg swelling. Negative for chest pain.   Gastrointestinal:  Positive for blood in stool. Negative for abdominal distention, abdominal pain, nausea and vomiting.   Genitourinary:  Negative for dysuria.   Musculoskeletal:  Positive for arthralgias (right foot) and myalgias (right foot). Negative for back pain.   Skin:  Negative for rash and wound.   Neurological:  Positive for weakness. Negative for dizziness.   Psychiatric/Behavioral:  Negative for confusion.      Objective:     Vital Signs (Most Recent):  Temp: 98.7 °F (37.1 °C) (03/17/23 1524)  Pulse: 77 (03/17/23 1555)  Resp: 16 (03/17/23 1555)  BP: (!) 121/57 (03/17/23 1524)  SpO2: 97 % (03/17/23 1555)   Vital Signs (24h Range):  Temp:  [97 °F (36.1 °C)-99.8 °F (37.7 °C)] 98.7 °F (37.1 °C)  Pulse:  [77-88] 77  Resp:  [16-18] 16  SpO2:  [90 %-97 %] 97 %  BP: (121-146)/(51-66) 121/57     Weight: 73.5 kg (162 lb 0.6 oz)  Body mass index is 27.81 kg/m².  No intake or output data in the 24 hours ending 03/17/23 1815     Physical Exam  Constitutional:       Appearance: Normal appearance.   HENT:      Head: Normocephalic.      Mouth/Throat:      Mouth: Mucous membranes are moist.   Eyes:      Extraocular Movements: Extraocular movements intact.       Pupils: Pupils are equal, round, and reactive to light.   Neck:      Comments: JVD absent  Cardiovascular:      Rate and Rhythm: Normal rate and regular rhythm.      Pulses: Normal pulses.      Heart sounds: Normal heart sounds.   Pulmonary:      Effort: Respiratory distress present.      Breath sounds: Normal breath sounds.   Abdominal:      General: Bowel sounds are normal. There is no distension.      Palpations: Abdomen is soft.      Tenderness: There is no abdominal tenderness.   Musculoskeletal:         General: Normal range of motion.      Cervical back: Neck supple.      Right lower leg: Edema present.      Left lower leg: Edema present.      Comments: Right TMA with dressing c/d/I and surgical shoe in place   Neurological:      General: No focal deficit present.      Mental Status: She is alert.   Psychiatric:         Mood and Affect: Mood normal.         Cognition and Memory: Cognition is impaired. Memory is impaired.       Significant Labs: A1C:   Recent Labs   Lab 02/28/23  0549   HGBA1C 8.2*       Blood Culture:   No results for input(s): LABBLOO in the last 48 hours.    CBC:   Recent Labs   Lab 03/16/23  1354 03/17/23  0604 03/17/23  1119   WBC 14.39* 12.91* 12.11   HGB 7.7* 7.4* 7.1*   HCT 25.1* 24.5* 24.0*   * 521* 456*       CMP:   Recent Labs   Lab 03/16/23  0045 03/16/23  0222 03/17/23  0604    142 144   K 4.2 4.6 4.2    103 105   CO2 29 29 30*   * 147* 147*   BUN 42* 42* 36*   CREATININE 0.9 1.0 0.8   CALCIUM 8.9 9.0 8.9   PROT 7.3 7.1 6.7   ALBUMIN 1.8* 1.9* 1.8*   BILITOT 0.6 0.6 0.5   ALKPHOS 62 81 70   AST 45* 45* 36   ALT 27 27 22   ANIONGAP 9 10 9       Coagulation:   Recent Labs   Lab 03/16/23 0222   APTT 37.8*       Lactic Acid:   No results for input(s): LACTATE in the last 48 hours.    Magnesium:   No results for input(s): MG in the last 48 hours.    POCT Glucose:   Recent Labs   Lab 03/16/23  1957 03/17/23  0533 03/17/23  1616   POCTGLUCOSE 169* 156* 152*            Significant Imaging: I have reviewed all pertinent imaging results/findings within the past 24 hours.

## 2023-03-17 NOTE — PROGRESS NOTES
[facility]  Vascular Surgery  Progress Note    Patient Name: Nelsy Marino  MRN: [id number]  Admission Date: 2/27/2023  Primary Care Provider: Juan F Garay    Subjective:     Interval History: RADHA MOBLEYS on 2L NC.  Reports pain is adequately controlled at this time.  GI recommending no further intervention.      Post-Op Info:  Procedure(s) (LRB):  AMPUTATION, FOOT, TRANSMETATARSAL (Right)   13 Days Post-Op       Medications:  Continuous Infusions:  Scheduled Meds:   amLODIPine  10 mg Oral Daily    apixaban  5 mg Oral BID    aspirin  81 mg Oral Daily    atorvastatin  40 mg Oral QHS    [START ON 3/24/2023] cephALEXin  1,000 mg Oral Q12H    ciprofloxacin HCl  500 mg Oral Q12H    clopidogreL  75 mg Oral Daily    dextromethorphan-guaiFENesin  mg  1 tablet Oral BID    DULoxetine  30 mg Oral BID    furosemide  40 mg Oral BID    insulin aspart U-100  0-5 Units Subcutaneous QID (AC & HS)    insulin aspart U-100  4 Units Subcutaneous TIDWM    insulin detemir U-100  12 Units Subcutaneous Daily    levalbuterol  1.25 mg Nebulization Q8H WA    melatonin  6 mg Oral Nightly    methocarbamoL  500 mg Oral QID     PRN Meds:sodium chloride, sodium chloride, acetaminophen, benzonatate, dextrose 10%, dextrose 10%, dextrose 10%, dextrose 10%, dextrose, dextrose, dextrose, dextrose, glucagon (human recombinant), glucagon (human recombinant), glucagon (human recombinant), HYDROmorphone, levalbuterol, naloxone, ondansetron, prochlorperazine, simethicone, sodium chloride 0.9%, traMADoL     Objective:     Vital Signs (Most Recent):  Temp: 97 °F (36.1 °C) (03/17/23 0716)  Pulse: 85 (03/17/23 0716)  Resp: 18 (03/17/23 0716)  BP: (!) 131/59 (03/17/23 0716)  SpO2: (!) 94 % (03/17/23 0400)   Vital Signs (24h Range):  Temp:  [97 °F (36.1 °C)-99.9 °F (37.7 °C)] 97 °F (36.1 °C)  Pulse:  [82-89] 85  Resp:  [16-18] 18  SpO2:  [90 %-95 %] 94 %  BP: (121-146)/(51-66) 131/59         Physical Exam  Vitals reviewed.   Constitutional:        Appearance: Normal appearance.   HENT:      Mouth/Throat:      Mouth: Mucous membranes are moist.   Eyes:      Pupils: Pupils are equal, round, and reactive to light.   Cardiovascular:      Rate and Rhythm: Normal rate and regular rhythm.   Pulmonary:      Effort: Pulmonary effort is normal.   Abdominal:      General: Abdomen is flat.      Palpations: Abdomen is soft.   Skin:     General: Skin is warm and dry.      Capillary Refill: Capillary refill takes less than 2 seconds.      Comments: R thigh incision appears CDI   L groin dressing- small portion of breakdown, no drainage   R foot TMA dressed per podiatry    Neurological:      General: No focal deficit present.      Mental Status: She is alert.   Psychiatric:         Mood and Affect: Mood normal.       Significant Labs:  CBC:   Recent Labs   Lab 03/16/23  1354   WBC 14.39*   RBC 2.93*   HGB 7.7*   HCT 25.1*   *   MCV 86   MCH 26.3*   MCHC 30.7*     CMP:   Recent Labs   Lab 03/16/23  0222   *   CALCIUM 9.0   ALBUMIN 1.9*   PROT 7.1      K 4.6   CO2 29      BUN 42*   CREATININE 1.0   ALKPHOS 81   ALT 27   AST 45*   BILITOT 0.6     Significant Diagnostics:  I have reviewed all pertinent imaging results/findings within the past 24 hours.    Assessment/Plan:     * Critical limb ischemia with history of revascularization of same extremity  Nelsy Marino is a 82 y.o. lady with severe PAD.    Procedures this hospital stay:  3/4: Right TMA  3/8: L external iliac to R SFA bypass, L common and external iliac stenting    Medications:  -ASA/plavix/Statin/Eliquis were stopped due to blood in stool      **Recommend restarting these medications ASAP  -Abx: Merrem     Labs:   H&H stable at this time  GI consulted for concern for bleeding; no intervention warranted   - ok to continue AC       Diet:  Advance to Diabetic    PT & OT:  Ordered & seeing patient    Nursing:  Assist with keeping groins dry  Q4 neurovascular checks, notify vascular if any  changes    Wound Care:   Keep groins clean and dry, cover with clean gauze and tape  Change dressing once per shift       Disposition: SNF once stable for discharge   SW/CM Needs:  Assistance with SNF placement           GREGORY Coronel MD  Vascular Surgery  [facility]

## 2023-03-17 NOTE — ASSESSMENT & PLAN NOTE
- on chronic suppression with keflex  - escalated to zosyn due to increased oxygen requirement to cover to possible PNA  - Per ID, discontinued zosyn per ID. completed linezolid. Meropenem can be transitioned to ciprofloxacin 500 mg q12 hours to finish total 14 days of therapy for pseudomonas fluorescens SSTI. EOT 3/23/23. After this point, patient can resume her cephalexin 1000 mg BID Ppx regimen for chronic suppression of prior graft infection.

## 2023-03-17 NOTE — PROGRESS NOTES
[facility]  Progress Note    Patient Name: Nelsy Marino  MRN: [id number]  Patient Class: IP- Inpatient   Admission Date: 2/27/2023  Length of Stay: 18 days  Attending Physician: Tanner Coello MD  Primary Care Provider: Juan F Garay        Subjective:     Principal Problem:Critical limb ischemia with history of revascularization of same extremity        HPI:  This is an 82-year-old female with a past medical history of severe PAD, hypertension, type 2 diabetes, history of DVT who presents with right foot pain.    Patient reports worsening right foot pain, swelling and discoloration that started after she had an ingrown toenail removed by her podiatrist.  She reports that her discoloration started since 2/7. Of note, the patient underwent an angiogram on 02/23 by vascular surgery. In the ED, she was tachycardic (112), and hypertensive.  Labs showed microcytic anemia (10.2), elevated sed rate (112), elevated CRP (56.5), elevated lactic acid (2.5).  Foot x-ray showed DJD and spurs on the calcaneus with no acute process.  Vascular surgery was consulted and recommended continuing antibiotics and evaluating in the morning.  She was given morphine 4 mg IV, vancomycin, Zosyn and was admitted for further management.      Overview/Hospital Course:   82-year-old female with a past medical history of severe PAD, hypertension, type 2 diabetes, history of DVT admitted on 02/27/2023 for gangrene of right toes. Of note, patient recent underwent LE angiogram by Dr. Tran on 02/23/23. Per OP report, was able to get successful revascularization / resolution of stenosis. XR right foot with no fracture, dislocation, bone destruction, or foreign body seen.  There is DJD and spurs on the calcaneus.  Was started on IV Zosyn and vancomycin on admit. Blood  cx with NGTD. On 02/28, patient with hypoglycemia but was asymptomatic. Improved with glucogen. Likely due to NPO status and was still receiving  insulin.  Cardiology consulted for preoperative clearance.  Recommended stress test.  Normal myocardial perfusion scan.  Cardiology deem patient at acceptable cardiac risk for vasc surgery/anesthesia.  Vascular surgery consulted and patient underwent fem fem bypass on 3/2/2023. Podiatry also consulted and patient underwent right TMA on 3/4/23.     Stepped down to Hospital Medicine service. O2 requirement steadily improving while on diuresis.  Patient notes significant posterior ankle pain. Increased tramadol and ordered robaxin. Good urine output. Patient continues to note sleep disturbances mainly due to BLE pain. Ordering transfusion for Hb 6.4. WBC continues to rise from 13 to 15. Per vascular surgery, continuing wound care for right TMA and no evidence of active infection at the surgical site. Per ID, continuing supportive care and diuresis and discontinuing IV zosyn. Also will obtain respiratory cultures, order C diff panel and followup CT chest.            Interval History: Patient lying in bed, no acute distress. Family at bedside. This morning, patient has episode of melena. Patient HD stable but Hb dropped from 7.7 to 7.1. GI notified and recommending EGD on 3/18. Clear liquid diet today and NPO at MN. Holding DAPT and continuing eliquis for now. PT/OT recommending SNF but family prefers HH since insurance will not cover LTAC. Will discharge home with HH once patient is medically stable.       Review of Systems   Constitutional:  Positive for activity change and fatigue. Negative for chills and fever.   HENT:  Negative for congestion.    Eyes:  Negative for visual disturbance.   Respiratory:  Positive for cough and shortness of breath.    Cardiovascular:  Positive for leg swelling. Negative for chest pain.   Gastrointestinal:  Positive for blood in stool. Negative for abdominal distention, abdominal pain, nausea and vomiting.   Genitourinary:  Negative for dysuria.   Musculoskeletal:  Positive for  arthralgias (right foot) and myalgias (right foot). Negative for back pain.   Skin:  Negative for rash and wound.   Neurological:  Positive for weakness. Negative for dizziness.   Psychiatric/Behavioral:  Negative for confusion.      Objective:     Vital Signs (Most Recent):  Temp: 98.7 °F (37.1 °C) (03/17/23 1524)  Pulse: 77 (03/17/23 1555)  Resp: 16 (03/17/23 1555)  BP: (!) 121/57 (03/17/23 1524)  SpO2: 97 % (03/17/23 1555)   Vital Signs (24h Range):  Temp:  [97 °F (36.1 °C)-99.8 °F (37.7 °C)] 98.7 °F (37.1 °C)  Pulse:  [77-88] 77  Resp:  [16-18] 16  SpO2:  [90 %-97 %] 97 %  BP: (121-146)/(51-66) 121/57     Weight: 73.5 kg (162 lb 0.6 oz)  Body mass index is 27.81 kg/m².  No intake or output data in the 24 hours ending 03/17/23 1815     Physical Exam  Constitutional:       Appearance: Normal appearance.   HENT:      Head: Normocephalic.      Mouth/Throat:      Mouth: Mucous membranes are moist.   Eyes:      Extraocular Movements: Extraocular movements intact.      Pupils: Pupils are equal, round, and reactive to light.   Neck:      Comments: JVD absent  Cardiovascular:      Rate and Rhythm: Normal rate and regular rhythm.      Pulses: Normal pulses.      Heart sounds: Normal heart sounds.   Pulmonary:      Effort: Respiratory distress present.      Breath sounds: Normal breath sounds.   Abdominal:      General: Bowel sounds are normal. There is no distension.      Palpations: Abdomen is soft.      Tenderness: There is no abdominal tenderness.   Musculoskeletal:         General: Normal range of motion.      Cervical back: Neck supple.      Right lower leg: Edema present.      Left lower leg: Edema present.      Comments: Right TMA with dressing c/d/I and surgical shoe in place   Neurological:      General: No focal deficit present.      Mental Status: She is alert.   Psychiatric:         Mood and Affect: Mood normal.         Cognition and Memory: Cognition is impaired. Memory is impaired.       Significant Labs:  A1C:   Recent Labs   Lab 02/28/23  0549   HGBA1C 8.2*       Blood Culture:   No results for input(s): LABBLOO in the last 48 hours.    CBC:   Recent Labs   Lab 03/16/23  1354 03/17/23  0604 03/17/23  1119   WBC 14.39* 12.91* 12.11   HGB 7.7* 7.4* 7.1*   HCT 25.1* 24.5* 24.0*   * 521* 456*       CMP:   Recent Labs   Lab 03/16/23  0045 03/16/23  0222 03/17/23  0604    142 144   K 4.2 4.6 4.2    103 105   CO2 29 29 30*   * 147* 147*   BUN 42* 42* 36*   CREATININE 0.9 1.0 0.8   CALCIUM 8.9 9.0 8.9   PROT 7.3 7.1 6.7   ALBUMIN 1.8* 1.9* 1.8*   BILITOT 0.6 0.6 0.5   ALKPHOS 62 81 70   AST 45* 45* 36   ALT 27 27 22   ANIONGAP 9 10 9       Coagulation:   Recent Labs   Lab 03/16/23  0222   APTT 37.8*       Lactic Acid:   No results for input(s): LACTATE in the last 48 hours.    Magnesium:   No results for input(s): MG in the last 48 hours.    POCT Glucose:   Recent Labs   Lab 03/16/23 1957 03/17/23  0533 03/17/23  1616   POCTGLUCOSE 169* 156* 152*           Significant Imaging: I have reviewed all pertinent imaging results/findings within the past 24 hours.      Assessment/Plan:      * Critical limb ischemia with history of revascularization of same extremity  -Vascular surgery consult. apprec recs  -- s/p fem fem bypass on 3/2/2023  -- s/p right TMA on 3/4/2023.   -ID consulted. apprec recs  -- switched Meropenem to ciprofloxacin 500 mg q12 hours to finish total 14 days of therapy for pseudomonas fluorescens SSTI. EOT 3/23/23. After this point, patient can resume her cephalexin 1000 mg BID Ppx regimen for chronic suppression of prior graft infection.   -- Q4H neurovasc checks  -- ASA/plavix/Statin/Eliquis. Continuing AC and DAPT in the setting of GIB due to recent bypass  -- PT/OT  --Recommend PRBC transfusion  --Consider GI eval or imaging   --UA cultures negative, blood cultures no growth to date, wound cultures NGTD, cdiff pending.  -Podiatry consulted. apprec recs  --Dressing changes by  "nursing.  --RLE WB to heel in surgical shoe for transfers or short distances.   --Elevate RLE at all times while in bed or chair.  -- Continuing to monitor right TMA site. If foot worsens or no other source is found then may consider opening of incision and exploration of surgical wound   - outpatient podiatry wound care instructions when patient medically ready for discharge: Please order ambulatory referral to podiatry for outpatient followup. RLE WB to heel in surgical shoe for transfers or short distances. HH/facility dressing changes R foot 3x/week: cleanse incision with saline, apply betadine soaked adaptic, apply gauze, wrap with cast padding and coban.       Melena  - episodes of melena while on Eliquis and DAPT  - s/p 2 units pRBC on 3/11 for Hb 5.9  - GI consulted. apprec recs  -- EGD scheduled for 3/18  -- Clear liquids today, NPO at midnight.   -- Given recent SFA bypass, may have to continue anticoagulation since risks of stopping outweigh benefits.   -- IV pantoprazole 40mg BID  -- Please correct any coagulopathy with platelets and FFP for goal of platelets >50K and INR <2.0      Upper GI bleed  - see "Melena"        Bilious vomiting  - reports regular bowel movements  - bili normal, AST/ALT normal   - clear liquid diet      Acute encephalopathy  - on 3/12, patient more somnolent and had tremors  - discontinued gabapentin and depakote  - Scr back to baseline so low suspicion for uremia   - continue antibiotic for possible infection   - RESOLVED      Leukocytosis  - see "sepsis"  - IMPROVING      S/P transmetatarsal amputation of foot, right  - stable  - appreciated vascular surgery and podiatry recs  -- s/p TMA 3/4  -- low threshold to escalate antibiotics. If her graft becomes infected and requires antibiotics, she has no other options left for revascularizations  -- Q4H neurovasc checks  -- ASA/plavix/Statin/Eliquis   -- PT/OT  --Dressing changes by nursing.  --RLE WB to heel in surgical shoe for " transfers or short distances.   --Elevate RLE at all times while in bed or chair.  -- DC Instructions: Please order ambulatory referral to podiatry for outpatient followup. RLE WB to heel in surgical shoe for transfers or short distances. HH/facility dressing changes R foot 3x/week: cleanse incision with saline, apply betadine soaked adaptic, apply gauze, wrap with cast padding and coban.   - scheduled tylenol and prn tramadol      Vascular graft infection  - on chronic suppression with keflex  - escalated to zosyn due to increased oxygen requirement to cover to possible PNA  - Per ID, discontinued zosyn per ID. completed linezolid. Meropenem can be transitioned to ciprofloxacin 500 mg q12 hours to finish total 14 days of therapy for pseudomonas fluorescens SSTI. EOT 3/23/23. After this point, patient can resume her cephalexin 1000 mg BID Ppx regimen for chronic suppression of prior graft infection.       Hypoglycemia  Better now  Continue levemir and LDSS    Sepsis  -Right foot appears to have wet gangrene on exam. S/p right TMA on 3/4  - fever 100.5 on 3/9  - WBC risin --> 15 --> 18  - procal slightly elevated at 0.65  - ESR 64,   - per vascular surgery, right TMA does not appear infected  - per podiatry, if no other infectious source identified, will consider opening of incision and exploration of surgical wound.   - ID consulted. apprec recs  -- discontinued IV zosyn. Started linezolid to cover for SSTI  -- continue supportive care  -- blood culture NGTD  - stool sample for c diff panel could not be obtained   -- followup sputum culture, RIP  -- CT C/A/P negative for infection        History of DVT (deep vein thrombosis)  Not on AC outpatient.    - heparin gtt in replace of eliquis for now until Hb stable and no additional surgical intervention indicated  - was switched to eliquis but now holding AC on 3/11 due to drop in Hb. No evidence of active bleeding so will reassess H/H tomrrow and if stable  "will resume eliquis.    Acute hypoxemic respiratory failure  Patient with Hypercapnic and Hypoxic Respiratory failure which is Acute on chronic.  she is not on home oxygen. Supplemental oxygen was provided and noted-  .   Signs/symptoms of respiratory failure include- tachypnea, increased work of breathing and lethargy. Contributing diagnoses includes - CHF, Obesity Hypoventilation and Pneumonia Labs and images were reviewed. Patient Has recent ABG, which has been reviewed. Will treat underlying causes and adjust management of respiratory failure.    PLAN:  - increased from 2 L NC to 12 L NC on 3/78  - now weaned to 2 L NC  - CXR showed pulmonary edema with no consolidation  - switched to xoponex due to tremors on duonebs  - incentive spirometry   - mucinex and prn tessalon perles  - chest PT and acapella   - OOB to chair  - IMPROVING        Type 2 diabetes mellitus with chronic kidney disease, with long-term current use of insulin  Stable from hypoglycemia standpoint; continue levemir and LDSSI      Essential hypertension  -Continue home medications:  Amlodipine 10 mg daily, hydrochlorothiazide 25 mg daily, losartan 100 mg daily      PVD (peripheral vascular disease)  Cont. home medications:  Aspirin, cilostazol  Consult vascular surgery: Rec transfer to [facility] for hybrid OR surgery 3/2/23 for RLE bypass tomorrow with Dr. Dunbar  - see "critical limb ischemia"      VTE Risk Mitigation (From admission, onward)         Ordered     apixaban tablet 5 mg  2 times daily         03/16/23 0959     Reason for No Pharmacological VTE Prophylaxis  Once        Question:  Reasons:  Answer:  IV Heparin w/in 24 hrs. Pre or Post-Op    03/02/23 1918     IP VTE HIGH RISK PATIENT  Once         03/02/23 1918     Place sequential compression device  Until discontinued         02/27/23 2200                Discharge Planning   JUAN ALBERTO: 3/17/2023     Code Status: Full Code   Is the patient medically ready for discharge?: No    Reason for " patient still in hospital (select all that apply): Patient unstable, Patient trending condition, Laboratory test, Treatment, Consult recommendations, PT / OT recommendations and Pending disposition  Discharge Plan A: Long-term acute care facility (LTAC)   Discharge Delays: None known at this time        Time spent in care of patient: > 35 minutes         Tanner Coello MD  Department of Hospital Medicine   [facility]

## 2023-03-17 NOTE — PHYSICIAN QUERY
"PT Name: Nelsy Marino  MR #: [id number]    DOCUMENTATION CLARIFICATION     CDS/: Jayne Tai RN              Contact information: [email or web address]  This form is a permanent document in the medical record.     Query Date: 2023    By submitting this query, we are merely seeking further clarification of documentation. Please utilize your independent clinical judgment when addressing the question(s) below.    The Medical Record contains the following:   Indicators   Supporting Clinical Findings   Location in Medical Record   x AMS, Confusion,  LOC, etc.  Acute encephalopathy  - on 3/12, patient more somnolent and had tremors  - discontinued gabapentin and depakote  - Scr back to baseline so low suspicion for uremia  - continue antibiotic for possible infection      Acute encephalopathy  - on 3/12, patient more somnolent and had tremors  - discontinued gabapentin and depakote  - Scr back to baseline so low suspicion for uremia  - continue antibiotic for possible infection  - RESOLVED   PN 3/12 HM            PN 3/13 HM   x Acute/Chronic Illness  past medical history of severe PAD, hypertension, type 2 diabetes, history of DVT who presents with right foot pain.    Leukocytosis  - see "sepsis"  S/P transmetatarsal amputation of foot, right    Sepsis  -Right foot appears to have wet gangrene on exam. S/p right TMA on 3/4  - fever 100.5 on 3/9  - WBC risin --> 15 --> 18  - procal slightly elevated at 0.65  - ESR 64,   - per vascular surgery, right TMA does not appear infected  - per podiatry, if no other infectious source identified, will consider opening of incision and exploration of surgical wound.   - ID consulted. apprec recs  -- discontinued IV zosyn. Started linezolid to cover for SSTI  -- continue supportive care  -- blood culture NGTD  - stool sample for c diff panel could not be obtained  -- followup sputum culture, RIP  -- CT C/A/P negative for infection PN 3/12 HM      PN 3/12 " HM        PN 3/12 HM    Radiology Findings      Electrolyte Imbalance      Medication     x Treatment         Later in the afternoon, nurse reported that patient was more somnolent and had tremors. Gabapentin and depakote discontinued and will continue to monitor mental status. Continuing linezolid  per ID recs for fever and rising WBC. If patient worsens, podiatry will consider re-exploration of right TMA. PN 3/12 HM    Other       The noted clinical guidelines are only system guidelines and do not replace the providers clinical judgment.    The National [place] of Neurologic Disorders and Stroke (NINDS) of the NIH describes encephalopathy as any diffuse disease of the brain that alters brain function or structure.    Provider, please specify the Encephalopathy diagnosis  associated with above clinical findings.    [ X  ] Metabolic Encephalopathy - Due to electrolyte imbalance, metabolic derangements, or infectious processes, includes Septic Encephalopathy, Uremic Encephalopathy     [   ] Other  (please specify condition): __________     [   ]  Clinically Undetermined       Please document in your progress notes daily for the duration of treatment until resolved, and include in your discharge summary.    References:  NICOLE Conner RN, CCDS. (2018, June 9). Notes from the Instructor: Encephalopathy tips. Retrieved October 22, 2020, from [email or web address]    ICD-9-CM Coding Clinic First Quarter 2013, Effective with discharges: October 21, 2013 [facility] § Seizure with encephalopathy due to postictal state (2013).    ICD-10-CM/PCS [employer] Integrated Codebook (Version V.20.8.10.0) [Computer software]. (2020). Retrieved October 21, 2020.    National [place] of Neurological Disorders and Stroke. (2019, March 27). Retrieved October 22, 2020, from https://www.ninds.nih.gov/Disorders/All-Disorders/Uwcaapotexawpr-Yenoeeqykej-Zwzd    Form No. 65125

## 2023-03-17 NOTE — PT/OT/SLP PROGRESS
Physical Therapy Treatment    Patient Name:  Nelsy Marino   MRN:  [id number]    Recommendations:     Discharge Recommendations: nursing facility, skilled  Discharge Equipment Recommendations: wheelchair, lift device, hospital bed (TBD by next level of care)  Barriers to discharge: Inaccessible home and Decreased caregiver support    Assessment:     Nelsy Marino is a 82 y.o. female admitted with a medical diagnosis of Critical limb ischemia with history of revascularization of same extremity.  She presents with the following impairments/functional limitations: weakness, impaired endurance, impaired self care skills, impaired functional mobility, gait instability, impaired balance, decreased lower extremity function, decreased safety awareness, pain, orthopedic precautions, decreased ROM. Pt tx session focused a lot on bed mobility due to pt having a few BM accidents during session. Pt required SBA-Min A for bed bed mobility, t/f'ed Mod A with RW, and took 3 steps to bedside chair Mod A with RW. Pt will continue to benefit from PT services to improve all deficits noted above. Resume PT POC as indicated.     Rehab Prognosis: Good; patient would benefit from acute skilled PT services to address these deficits and reach maximum level of function.    Recent Surgery: Procedure(s) (LRB):  AMPUTATION, FOOT, TRANSMETATARSAL (Right) 13 Days Post-Op    Plan:     During this hospitalization, patient to be seen 3 x/week to address the identified rehab impairments via gait training, therapeutic activities, therapeutic exercises, neuromuscular re-education and progress toward the following goals:    Plan of Care Expires:  04/04/23    Subjective       Patient was agreeable to participate with therapy.     Pain/Comfort:  Pain Rating 1: 0/10  Pain Rating Post-Intervention 1: 0/10      Objective:     Communicated with nursing prior to session.  Patient found HOB elevated with  (all lines intact and sister in-law present) upon PT  entry to room.     General Precautions: Standard, fall  Orthopedic Precautions: N/A  Braces: DARCO shoe  Respiratory Status: Nasal cannula      Functional Mobility:  Bed Mobility:  Rolling Left: multiple x's to clean patient and apply new brief contact guard assistance  Rolling Right: multiple x's contact guard assistance and use of bed rail  Scooting: contact guard assistance  Supine to Sit: minimum assistance  Transfers:  Sit to Stand: to/from EOB x2 trials   moderate assistance with rolling walker  Gait: Pt able to take 3 steps to bedside chair with moderate assistance using RW.   Balance: Pt sat EOB ~10 minutes prior to step transfer to bedside chair.       AM-PAC 6 CLICK MOBILITY  Turning over in bed (including adjusting bedclothes, sheets and blankets)?: 3  Sitting down on and standing up from a chair with arms (e.g., wheelchair, bedside commode, etc.): 2  Moving from lying on back to sitting on the side of the bed?: 3  Moving to and from a bed to a chair (including a wheelchair)?: 2  Need to walk in hospital room?: 2  Climbing 3-5 steps with a railing?: 1  Basic Mobility Total Score: 13       Treatment & Education:  -Pt required assistance with carie-care.   -Doffed/donned clean gown, and brief at start of tx session.   -BLE therex x10 reps: LAQ and HF    Patient left up in chair with all lines intact, call button in reach, nursing present, and BLE's elevated ..    GOALS:   Multidisciplinary Problems       Physical Therapy Goals          Problem: Physical Therapy    Goal Priority Disciplines Outcome Goal Variances Interventions   Physical Therapy Goal     PT, PT/OT Ongoing, Progressing     Description: Goals to be met by: 3/20     Patient will increase functional independence with mobility by performin. Supine to sit with Stand-by Assistance  2. Rolling to Left and Right with Stand-by Assistance.  3. Sit to stand transfer with Contact Guard Assistance using Rolling Walker  4. Gait  x 50 feet with  Contact Guard Assistance using Rolling Walker.   5. Ascend/descend 1 stair with bilateral Handrails Contact Guard Assistance using Rolling Walker.                          Time Tracking:     PT Received On: 03/17/23  PT Start Time: 1338     PT Stop Time: 1421  PT Total Time (min): 43 min     Billable Minutes: Therapeutic Activity 38    Treatment Type: Treatment  PT/PTA: PTA     Number of PTA visits since last PT visit: 1 03/17/2023

## 2023-03-17 NOTE — PT/OT/SLP PROGRESS
Occupational Therapy   Treatment    Name: Nelsy Marino  MRN: [id number]  Admitting Diagnosis:  Critical limb ischemia with history of revascularization of same extremity  13 Days Post-Op    Recommendations:     Discharge Recommendations: nursing facility, skilled  Discharge Equipment Recommendations:   (TBD)  Barriers to discharge:  None    Assessment:     Nelsy Marino is a 82 y.o. female with a medical diagnosis of Critical limb ischemia with history of revascularization of same extremity.  She presents with performance deficits affecting function are weakness, impaired endurance, impaired self care skills, impaired functional mobility, gait instability, impaired balance, decreased lower extremity function, impaired cardiopulmonary response to activity, orthopedic precautions. Pt participates well and is motivated to regain functional independence in mobility and self care.      Rehab Prognosis:  Good; patient would benefit from acute skilled OT services to address these deficits and reach maximum level of function.       Plan:     Patient to be seen 3 x/week to address the above listed problems via self-care/home management, therapeutic activities, therapeutic exercises  Plan of Care Expires: 04/05/23  Plan of Care Reviewed with: patient    Subjective     Pain/Comfort:  Pain Rating 1: 0/10    Objective:     Communicated with: RN prior to session.  Patient found up in chair with oxygen upon OT entry to room.    General Precautions: Standard, fall    Orthopedic Precautions:RLE weight bearing as tolerated for short t/f's  Braces:  (DARCO shoe)  Respiratory Status: Nasal cannula, flow 2 L/min     Occupational Performance:     Bed Mobility:    Patient completed Rolling/Turning to Left with  independence  Patient completed Rolling/Turning to Right with independence  Patient completed Sit to Supine with minimum assistance     Functional Mobility/Transfers:  Patient completed Sit <> Stand Transfer with contact guard  assistance  with  rolling walker   Patient completed Toilet Transfer Stand Pivot technique with maximal assistance with  rolling walker  Functional Mobility: CGA with RW, very slow sabine, poor posture; utilized rolling bedside chair for distance to/from bathroom.    Activities of Daily Living:  Grooming: stand by assistance and Setup A to clean hands seated on toilet  Upper Body Dressing: moderate assistance gown  Lower Body Dressing: maximal assistance socks due to soiling herself (seeped out of diaper onto her leg and foot)  Toileting: total assistance bowel movement in diaper; A to remove at toilet; A to wipe after another bowel movement ; A to don new diaper      [facility] 6 Click ADL: 17    Treatment & Education:  Pt edu re OT role, POC and safety.  Pt performed mobility with varying levels of assistance.  Pt able to t/f from bedside chair with arm rests and CGA, but requiring Max A at toilet (would benefit from BSC over toilet).  Pt fatigued after multiple bowel movements, requiring increasing assistance as session progressed, ultimately requiring Max/Total A for toileting and hygiene.    Patient left supine with all lines intact, call button in reach, and  present and RN  notified    GOALS:   Multidisciplinary Problems       Occupational Therapy Goals          Problem: Occupational Therapy    Goal Priority Disciplines Outcome Interventions   Occupational Therapy Goal     OT, PT/OT Ongoing, Progressing    Description: Goals to be met by: 3/20/23     Patient will increase functional independence with ADLs by performing:    Feeding with Forrest.  UE Dressing with Forrest.  LE Dressing with Supervision.  Grooming while standing at sink with Stand-by Assistance.  Toileting from toilet with Stand-by Assistance for hygiene and clothing management.   Toilet transfer to toilet with Stand-by Assistance.                         Time Tracking:     OT Date of Treatment: 03/17/23  OT Start Time: 1615  OT  Stop Time: 1700  OT Total Time (min): 45 min    Billable Minutes:Self Care/Home Management 30 minutes  Therapeutic Activity 15 minutes    OT/GEORGE: OT          CHELSI Lutz  3/17/2023  Pager: [phone]

## 2023-03-17 NOTE — SUBJECTIVE & OBJECTIVE
Medications:  Continuous Infusions:  Scheduled Meds:   amLODIPine  10 mg Oral Daily    apixaban  5 mg Oral BID    aspirin  81 mg Oral Daily    atorvastatin  40 mg Oral QHS    [START ON 3/24/2023] cephALEXin  1,000 mg Oral Q12H    ciprofloxacin HCl  500 mg Oral Q12H    clopidogreL  75 mg Oral Daily    dextromethorphan-guaiFENesin  mg  1 tablet Oral BID    DULoxetine  30 mg Oral BID    furosemide  40 mg Oral BID    insulin aspart U-100  0-5 Units Subcutaneous QID (AC & HS)    insulin aspart U-100  4 Units Subcutaneous TIDWM    insulin detemir U-100  12 Units Subcutaneous Daily    levalbuterol  1.25 mg Nebulization Q8H WA    melatonin  6 mg Oral Nightly    methocarbamoL  500 mg Oral QID     PRN Meds:sodium chloride, sodium chloride, acetaminophen, benzonatate, dextrose 10%, dextrose 10%, dextrose 10%, dextrose 10%, dextrose, dextrose, dextrose, dextrose, glucagon (human recombinant), glucagon (human recombinant), glucagon (human recombinant), HYDROmorphone, levalbuterol, naloxone, ondansetron, prochlorperazine, simethicone, sodium chloride 0.9%, traMADoL     Objective:     Vital Signs (Most Recent):  Temp: 97 °F (36.1 °C) (03/17/23 0716)  Pulse: 85 (03/17/23 0716)  Resp: 18 (03/17/23 0716)  BP: (!) 131/59 (03/17/23 0716)  SpO2: (!) 94 % (03/17/23 0400)   Vital Signs (24h Range):  Temp:  [97 °F (36.1 °C)-99.9 °F (37.7 °C)] 97 °F (36.1 °C)  Pulse:  [82-89] 85  Resp:  [16-18] 18  SpO2:  [90 %-95 %] 94 %  BP: (121-146)/(51-66) 131/59         Physical Exam  Vitals reviewed.   Constitutional:       Appearance: Normal appearance.   HENT:      Mouth/Throat:      Mouth: Mucous membranes are moist.   Eyes:      Pupils: Pupils are equal, round, and reactive to light.   Cardiovascular:      Rate and Rhythm: Normal rate and regular rhythm.   Pulmonary:      Effort: Pulmonary effort is normal.   Abdominal:      General: Abdomen is flat.      Palpations: Abdomen is soft.   Skin:     General: Skin is warm and dry.       Capillary Refill: Capillary refill takes less than 2 seconds.      Comments: R thigh incision appears CDI   L groin dressing- small portion of breakdown, no drainage   R foot TMA dressed per podiatry    Neurological:      General: No focal deficit present.      Mental Status: She is alert.   Psychiatric:         Mood and Affect: Mood normal.       Significant Labs:  CBC:   Recent Labs   Lab 03/16/23  1354   WBC 14.39*   RBC 2.93*   HGB 7.7*   HCT 25.1*   *   MCV 86   MCH 26.3*   MCHC 30.7*     CMP:   Recent Labs   Lab 03/16/23  0222   *   CALCIUM 9.0   ALBUMIN 1.9*   PROT 7.1      K 4.6   CO2 29      BUN 42*   CREATININE 1.0   ALKPHOS 81   ALT 27   AST 45*   BILITOT 0.6     Significant Diagnostics:  I have reviewed all pertinent imaging results/findings within the past 24 hours.

## 2023-03-18 LAB
ALBUMIN SERPL BCP-MCNC: 1.9 G/DL (ref 3.5–5.2)
ALP SERPL-CCNC: 62 U/L (ref 55–135)
ALT SERPL W/O P-5'-P-CCNC: 22 U/L (ref 10–44)
ANION GAP SERPL CALC-SCNC: 9 MMOL/L (ref 8–16)
AST SERPL-CCNC: 35 U/L (ref 10–40)
BASOPHILS # BLD AUTO: 0.02 K/UL (ref 0–0.2)
BASOPHILS NFR BLD: 0.2 % (ref 0–1.9)
BILIRUB SERPL-MCNC: 0.5 MG/DL (ref 0.1–1)
BNP SERPL-MCNC: 478 PG/ML (ref 0–99)
BUN SERPL-MCNC: 31 MG/DL (ref 8–23)
CALCIUM SERPL-MCNC: 9 MG/DL (ref 8.7–10.5)
CHLORIDE SERPL-SCNC: 106 MMOL/L (ref 95–110)
CO2 SERPL-SCNC: 31 MMOL/L (ref 23–29)
CREAT SERPL-MCNC: 0.8 MG/DL (ref 0.5–1.4)
DIFFERENTIAL METHOD: ABNORMAL
EOSINOPHIL # BLD AUTO: 0.2 K/UL (ref 0–0.5)
EOSINOPHIL NFR BLD: 2 % (ref 0–8)
ERYTHROCYTE [DISTWIDTH] IN BLOOD BY AUTOMATED COUNT: 15.4 % (ref 11.5–14.5)
EST. GFR  (NO RACE VARIABLE): >60 ML/MIN/1.73 M^2
GLUCOSE SERPL-MCNC: 136 MG/DL (ref 70–110)
HCT VFR BLD AUTO: 24.3 % (ref 37–48.5)
HGB BLD-MCNC: 7.2 G/DL (ref 12–16)
IMM GRANULOCYTES # BLD AUTO: 0.06 K/UL (ref 0–0.04)
IMM GRANULOCYTES NFR BLD AUTO: 0.5 % (ref 0–0.5)
INR PPP: 1.4 (ref 0.8–1.2)
LYMPHOCYTES # BLD AUTO: 0.8 K/UL (ref 1–4.8)
LYMPHOCYTES NFR BLD: 6.3 % (ref 18–48)
MCH RBC QN AUTO: 25.7 PG (ref 27–31)
MCHC RBC AUTO-ENTMCNC: 29.6 G/DL (ref 32–36)
MCV RBC AUTO: 87 FL (ref 82–98)
MONOCYTES # BLD AUTO: 0.5 K/UL (ref 0.3–1)
MONOCYTES NFR BLD: 3.7 % (ref 4–15)
NEUTROPHILS # BLD AUTO: 10.7 K/UL (ref 1.8–7.7)
NEUTROPHILS NFR BLD: 87.3 % (ref 38–73)
NRBC BLD-RTO: 0 /100 WBC
PLATELET # BLD AUTO: 465 K/UL (ref 150–450)
PMV BLD AUTO: 9.8 FL (ref 9.2–12.9)
POCT GLUCOSE: 134 MG/DL (ref 70–110)
POCT GLUCOSE: 136 MG/DL (ref 70–110)
POCT GLUCOSE: 200 MG/DL (ref 70–110)
POCT GLUCOSE: 204 MG/DL (ref 70–110)
POCT GLUCOSE: 255 MG/DL (ref 70–110)
POTASSIUM SERPL-SCNC: 4.1 MMOL/L (ref 3.5–5.1)
PROCALCITONIN SERPL IA-MCNC: 0.2 NG/ML
PROT SERPL-MCNC: 6.9 G/DL (ref 6–8.4)
PROTHROMBIN TIME: 14.2 SEC (ref 9–12.5)
RBC # BLD AUTO: 2.8 M/UL (ref 4–5.4)
SODIUM SERPL-SCNC: 146 MMOL/L (ref 136–145)
WBC # BLD AUTO: 12.29 K/UL (ref 3.9–12.7)

## 2023-03-18 PROCEDURE — 36415 COLL VENOUS BLD VENIPUNCTURE: CPT | Performed by: STUDENT IN AN ORGANIZED HEALTH CARE EDUCATION/TRAINING PROGRAM

## 2023-03-18 PROCEDURE — 99232 PR SUBSEQUENT HOSPITAL CARE,LEVL II: ICD-10-PCS | Mod: ,,, | Performed by: STUDENT IN AN ORGANIZED HEALTH CARE EDUCATION/TRAINING PROGRAM

## 2023-03-18 PROCEDURE — 25000003 PHARM REV CODE 250: Performed by: INTERNAL MEDICINE

## 2023-03-18 PROCEDURE — 94664 DEMO&/EVAL PT USE INHALER: CPT

## 2023-03-18 PROCEDURE — 84145 PROCALCITONIN (PCT): CPT | Performed by: INTERNAL MEDICINE

## 2023-03-18 PROCEDURE — 85610 PROTHROMBIN TIME: CPT | Performed by: INTERNAL MEDICINE

## 2023-03-18 PROCEDURE — 83880 ASSAY OF NATRIURETIC PEPTIDE: CPT | Performed by: INTERNAL MEDICINE

## 2023-03-18 PROCEDURE — 63600175 PHARM REV CODE 636 W HCPCS: Performed by: INTERNAL MEDICINE

## 2023-03-18 PROCEDURE — 85025 COMPLETE CBC W/AUTO DIFF WBC: CPT | Performed by: STUDENT IN AN ORGANIZED HEALTH CARE EDUCATION/TRAINING PROGRAM

## 2023-03-18 PROCEDURE — C9113 INJ PANTOPRAZOLE SODIUM, VIA: HCPCS | Performed by: INTERNAL MEDICINE

## 2023-03-18 PROCEDURE — 94799 UNLISTED PULMONARY SVC/PX: CPT

## 2023-03-18 PROCEDURE — 94761 N-INVAS EAR/PLS OXIMETRY MLT: CPT

## 2023-03-18 PROCEDURE — 80053 COMPREHEN METABOLIC PANEL: CPT | Performed by: STUDENT IN AN ORGANIZED HEALTH CARE EDUCATION/TRAINING PROGRAM

## 2023-03-18 PROCEDURE — 99900035 HC TECH TIME PER 15 MIN (STAT)

## 2023-03-18 PROCEDURE — 20600001 HC STEP DOWN PRIVATE ROOM

## 2023-03-18 PROCEDURE — 99232 SBSQ HOSP IP/OBS MODERATE 35: CPT | Mod: ,,, | Performed by: STUDENT IN AN ORGANIZED HEALTH CARE EDUCATION/TRAINING PROGRAM

## 2023-03-18 PROCEDURE — 25000003 PHARM REV CODE 250: Performed by: STUDENT IN AN ORGANIZED HEALTH CARE EDUCATION/TRAINING PROGRAM

## 2023-03-18 PROCEDURE — 27000221 HC OXYGEN, UP TO 24 HOURS

## 2023-03-18 RX ADMIN — METHOCARBAMOL 500 MG: 500 TABLET ORAL at 05:03

## 2023-03-18 RX ADMIN — INSULIN ASPART 4 UNITS: 100 INJECTION, SOLUTION INTRAVENOUS; SUBCUTANEOUS at 12:03

## 2023-03-18 RX ADMIN — INSULIN ASPART 4 UNITS: 100 INJECTION, SOLUTION INTRAVENOUS; SUBCUTANEOUS at 05:03

## 2023-03-18 RX ADMIN — FUROSEMIDE 40 MG: 40 TABLET ORAL at 05:03

## 2023-03-18 RX ADMIN — CIPROFLOXACIN 500 MG: 500 TABLET, FILM COATED ORAL at 09:03

## 2023-03-18 RX ADMIN — PANTOPRAZOLE SODIUM 40 MG: 40 INJECTION, POWDER, FOR SOLUTION INTRAVENOUS at 10:03

## 2023-03-18 RX ADMIN — METHOCARBAMOL 500 MG: 500 TABLET ORAL at 12:03

## 2023-03-18 RX ADMIN — INSULIN ASPART 3 UNITS: 100 INJECTION, SOLUTION INTRAVENOUS; SUBCUTANEOUS at 05:03

## 2023-03-18 RX ADMIN — APIXABAN 5 MG: 5 TABLET, FILM COATED ORAL at 10:03

## 2023-03-18 RX ADMIN — PANTOPRAZOLE SODIUM 40 MG: 40 INJECTION, POWDER, FOR SOLUTION INTRAVENOUS at 05:03

## 2023-03-18 RX ADMIN — AMLODIPINE BESYLATE 10 MG: 10 TABLET ORAL at 09:03

## 2023-03-18 RX ADMIN — DULOXETINE 30 MG: 30 CAPSULE, DELAYED RELEASE ORAL at 09:03

## 2023-03-18 RX ADMIN — ATORVASTATIN CALCIUM 40 MG: 40 TABLET, FILM COATED ORAL at 10:03

## 2023-03-18 RX ADMIN — APIXABAN 5 MG: 5 TABLET, FILM COATED ORAL at 09:03

## 2023-03-18 RX ADMIN — CLOPIDOGREL BISULFATE 75 MG: 75 TABLET ORAL at 10:03

## 2023-03-18 RX ADMIN — METHOCARBAMOL 500 MG: 500 TABLET ORAL at 09:03

## 2023-03-18 RX ADMIN — METHOCARBAMOL 500 MG: 500 TABLET ORAL at 10:03

## 2023-03-18 RX ADMIN — DULOXETINE 30 MG: 30 CAPSULE, DELAYED RELEASE ORAL at 10:03

## 2023-03-18 RX ADMIN — Medication 6 MG: at 10:03

## 2023-03-18 RX ADMIN — CIPROFLOXACIN 500 MG: 500 TABLET, FILM COATED ORAL at 10:03

## 2023-03-18 RX ADMIN — FUROSEMIDE 40 MG: 40 TABLET ORAL at 09:03

## 2023-03-18 RX ADMIN — ASPIRIN 81 MG: 81 TABLET, COATED ORAL at 09:03

## 2023-03-18 NOTE — SUBJECTIVE & OBJECTIVE
Interval History: Hb stable. Patient denies black tarry stools. Per GI no plans for EGD today. Will continue to monitor. Resuming diet      Review of Systems  Objective:     Vital Signs (Most Recent):  Temp: 98.6 °F (37 °C) (03/18/23 0400)  Pulse: 86 (03/18/23 0741)  Resp: 20 (03/18/23 0400)  BP: 136/84 (03/18/23 0741)  SpO2: 97 % (03/18/23 0741) Vital Signs (24h Range):  Temp:  [97.8 °F (36.6 °C)-99.3 °F (37.4 °C)] 98.6 °F (37 °C)  Pulse:  [77-87] 86  Resp:  [16-20] 20  SpO2:  [92 %-99 %] 97 %  BP: (121-147)/(57-84) 136/84     Weight: 73.5 kg (162 lb 0.6 oz)  Body mass index is 27.81 kg/m².  No intake or output data in the 24 hours ending 03/18/23 0946   Physical Exam  Constitutional:       Appearance: Normal appearance.   HENT:      Head: Normocephalic.      Mouth/Throat:      Mouth: Mucous membranes are moist.   Eyes:      Extraocular Movements: Extraocular movements intact.      Pupils: Pupils are equal, round, and reactive to light.   Cardiovascular:      Rate and Rhythm: Normal rate and regular rhythm.      Pulses: Normal pulses.      Heart sounds: Normal heart sounds.   Pulmonary:      Effort: Pulmonary effort is normal.      Breath sounds: Normal breath sounds.   Abdominal:      General: Bowel sounds are normal. There is no distension.      Palpations: Abdomen is soft.      Tenderness: There is no abdominal tenderness.   Musculoskeletal:         General: Normal range of motion.      Cervical back: Neck supple.      Comments: Right TMA with dressing c/d/I and surgical shoe in place   Neurological:      General: No focal deficit present.      Mental Status: She is alert.   Psychiatric:         Mood and Affect: Mood normal.

## 2023-03-18 NOTE — TREATMENT PLAN
BRIEF GI TREATMENT PLAN    NO more melena; greenish looking BM. Hb stable at 7.4>7.2.     Vitals:    03/18/23 0720 03/18/23 0741 03/18/23 1149 03/18/23 1611   BP:  136/84 127/60 (!) 141/60   BP Location:  Right arm Right arm Right arm   Patient Position:  Lying Lying Lying   Pulse: 80 86 86 88   Resp: 20 17 18   Temp:   97.7 °F (36.5 °C) 98.7 °F (37.1 °C)   TempSrc:  Oral Oral Oral   SpO2: 96% 97% 99% 96%   Weight:       Height:            Assessment/Plan:      82-year-old lady with an extensive past medical history including recent transmetatarsal amputation and SSA artery bypass (currently on aspirin, Plavix and Eliquis) on whom GI has been consulted for melena. Hb down from 9s>7. VSS. Hb stable 7.2.     #melena   #bilious vomiting      PLAN:  - No plan for EGD at this time  - Trend Hgb q8hrs. Transfuse for Hgb <7, unless otherwise indicated  - Maintain IV access with 2 large bore Ivs  - Intravascular resuscitation/support with IVFs   - Advance diet  - Given recent SFA bypass, may have to continue anticoagulation since risks of stopping outweigh benefits.   - IV pantoprazole 40mg BID  - Please correct any coagulopathy with platelets and FFP for goal of platelets >50K and INR <2.0  - Please notify GI team if there is significant change in patient's clinical status   Mckay Moe MD  PGY-IV, GI

## 2023-03-18 NOTE — PROGRESS NOTES
[facility] Medicine  Progress Note    Patient Name: Nelsy Marino  MRN: [id number]  Patient Class: IP- Inpatient   Admission Date: 2/27/2023  Length of Stay: 19 days  Attending Physician: Jasbir Hayward MD  Primary Care Provider: Juan F Garay        Subjective:     Principal Problem:Critical limb ischemia with history of revascularization of same extremity        HPI:  This is an 82-year-old female with a past medical history of severe PAD, hypertension, type 2 diabetes, history of DVT who presents with right foot pain.    Patient reports worsening right foot pain, swelling and discoloration that started after she had an ingrown toenail removed by her podiatrist.  She reports that her discoloration started since 2/7. Of note, the patient underwent an angiogram on 02/23 by vascular surgery. In the ED, she was tachycardic (112), and hypertensive.  Labs showed microcytic anemia (10.2), elevated sed rate (112), elevated CRP (56.5), elevated lactic acid (2.5).  Foot x-ray showed DJD and spurs on the calcaneus with no acute process.  Vascular surgery was consulted and recommended continuing antibiotics and evaluating in the morning.  She was given morphine 4 mg IV, vancomycin, Zosyn and was admitted for further management.      Overview/Hospital Course:   82-year-old female with a past medical history of severe PAD, hypertension, type 2 diabetes, history of DVT admitted on 02/27/2023 for gangrene of right toes. Of note, patient recent underwent LE angiogram by Dr. Tran on 02/23/23. Per OP report, was able to get successful revascularization / resolution of stenosis. XR right foot with no fracture, dislocation, bone destruction, or foreign body seen.  There is DJD and spurs on the calcaneus.  Was started on IV Zosyn and vancomycin on admit. Blood  cx with NGTD. On 02/28, patient with hypoglycemia but was asymptomatic. Improved with glucogen. Likely due to NPO status and was still receiving  insulin.  Cardiology consulted for preoperative clearance.  Recommended stress test.  Normal myocardial perfusion scan.  Cardiology deem patient at acceptable cardiac risk for vasc surgery/anesthesia.  Vascular surgery consulted and patient underwent fem fem bypass on 3/2/2023. Podiatry also consulted and patient underwent right TMA on 3/4/23.     Stepped down to Hospital Medicine service. O2 requirement steadily improving while on diuresis.  Patient notes significant posterior ankle pain. Increased tramadol and ordered robaxin. Good urine output. Patient continues to note sleep disturbances mainly due to BLE pain. Ordering transfusion for Hb 6.4. WBC continues to rise from 13 to 15. Per vascular surgery, continuing wound care for right TMA and no evidence of active infection at the surgical site. Per ID, continuing supportive care and diuresis and discontinuing IV zosyn. Also will obtain respiratory cultures, order C diff panel and followup CT chest.            Interval History: Hb stable. Patient denies black tarry stools. Per GI no plans for EGD today. Will continue to monitor. Resuming diet      Review of Systems  Objective:     Vital Signs (Most Recent):  Temp: 98.6 °F (37 °C) (03/18/23 0400)  Pulse: 86 (03/18/23 0741)  Resp: 20 (03/18/23 0400)  BP: 136/84 (03/18/23 0741)  SpO2: 97 % (03/18/23 0741) Vital Signs (24h Range):  Temp:  [97.8 °F (36.6 °C)-99.3 °F (37.4 °C)] 98.6 °F (37 °C)  Pulse:  [77-87] 86  Resp:  [16-20] 20  SpO2:  [92 %-99 %] 97 %  BP: (121-147)/(57-84) 136/84     Weight: 73.5 kg (162 lb 0.6 oz)  Body mass index is 27.81 kg/m².  No intake or output data in the 24 hours ending 03/18/23 0946   Physical Exam  Constitutional:       Appearance: Normal appearance.   HENT:      Head: Normocephalic.      Mouth/Throat:      Mouth: Mucous membranes are moist.   Eyes:      Extraocular Movements: Extraocular movements intact.      Pupils: Pupils are equal, round, and reactive to light.   Cardiovascular:       Rate and Rhythm: Normal rate and regular rhythm.      Pulses: Normal pulses.      Heart sounds: Normal heart sounds.   Pulmonary:      Effort: Pulmonary effort is normal.      Breath sounds: Normal breath sounds.   Abdominal:      General: Bowel sounds are normal. There is no distension.      Palpations: Abdomen is soft.      Tenderness: There is no abdominal tenderness.   Musculoskeletal:         General: Normal range of motion.      Cervical back: Neck supple.      Comments: Right TMA with dressing c/d/I and surgical shoe in place   Neurological:      General: No focal deficit present.      Mental Status: She is alert.   Psychiatric:         Mood and Affect: Mood normal.           Assessment/Plan:      * Critical limb ischemia with history of revascularization of same extremity  -Vascular surgery consult. apprec recs  -- s/p fem fem bypass on 3/2/2023  -- s/p right TMA on 3/4/2023.   -ID consulted. apprec recs  -- switched Meropenem to ciprofloxacin 500 mg q12 hours to finish total 14 days of therapy for pseudomonas fluorescens SSTI. EOT 3/23/23. After this point, patient can resume her cephalexin 1000 mg BID Ppx regimen for chronic suppression of prior graft infection.   -- Q4H neurovasc checks  -- ASA/plavix/Statin/Eliquis. Continuing AC and DAPT in the setting of GIB due to recent bypass  -- PT/OT  --Recommend PRBC transfusion  --Consider GI eval or imaging   --UA cultures negative, blood cultures no growth to date, wound cultures NGTD, cdiff pending.  -Podiatry consulted. apprec recs  --Dressing changes by nursing.  --RLE WB to heel in surgical shoe for transfers or short distances.   --Elevate RLE at all times while in bed or chair.  -- Continuing to monitor right TMA site. If foot worsens or no other source is found then may consider opening of incision and exploration of surgical wound   - outpatient podiatry wound care instructions when patient medically ready for discharge: Please order ambulatory  "referral to podiatry for outpatient followup. RLE WB to heel in surgical shoe for transfers or short distances. HH/facility dressing changes R foot 3x/week: cleanse incision with saline, apply betadine soaked adaptic, apply gauze, wrap with cast padding and coban.       Melena  - episodes of melena while on Eliquis and DAPT  - s/p 2 units pRBC on 3/11 for Hb 5.9  - GI consulted. apprec recs. No plans for EGD for now as Hb is stable. CTM.     Upper GI bleed  - see "Melena"        Bilious vomiting  - reports regular bowel movements  - bili normal, AST/ALT normal   - clear liquid diet      Acute encephalopathy  - on 3/12, patient more somnolent and had tremors  - discontinued gabapentin and depakote  - Scr back to baseline so low suspicion for uremia   - continue antibiotic for possible infection   - RESOLVED      Leukocytosis  - see "sepsis"  - IMPROVING      S/P transmetatarsal amputation of foot, right  - stable  - appreciated vascular surgery and podiatry recs  -- s/p TMA 3/4  -- low threshold to escalate antibiotics. If her graft becomes infected and requires antibiotics, she has no other options left for revascularizations  -- Q4H neurovasc checks  -- ASA/plavix/Statin/Eliquis   -- PT/OT  --Dressing changes by nursing.  --RLE WB to heel in surgical shoe for transfers or short distances.   --Elevate RLE at all times while in bed or chair.  -- DC Instructions: Please order ambulatory referral to podiatry for outpatient followup. RLE WB to heel in surgical shoe for transfers or short distances. HH/facility dressing changes R foot 3x/week: cleanse incision with saline, apply betadine soaked adaptic, apply gauze, wrap with cast padding and coban.   - scheduled tylenol and prn tramadol      Vascular graft infection  - on chronic suppression with keflex  - escalated to zosyn due to increased oxygen requirement to cover to possible PNA  - Per ID, discontinued zosyn per ID. completed linezolid. Meropenem can be " transitioned to ciprofloxacin 500 mg q12 hours to finish total 14 days of therapy for pseudomonas fluorescens SSTI. EOT 3/23/23. After this point, patient can resume her cephalexin 1000 mg BID Ppx regimen for chronic suppression of prior graft infection.       Hypoglycemia  Better now  Continue levemir and LDSS    Sepsis  -Right foot appears to have wet gangrene on exam. S/p right TMA on 3/4  - fever 100.5 on 3/9  - WBC risin --> 15 --> 18  - procal slightly elevated at 0.65  - ESR 64,   - per vascular surgery, right TMA does not appear infected  - per podiatry, if no other infectious source identified, will consider opening of incision and exploration of surgical wound.   - ID consulted. apprec recs  -- discontinued IV zosyn. Started linezolid to cover for SSTI  -- continue supportive care  -- blood culture NGTD  - stool sample for c diff panel could not be obtained   -- followup sputum culture, RIP  -- CT C/A/P negative for infection        History of DVT (deep vein thrombosis)  Not on AC outpatient.    - heparin gtt in replace of eliquis for now until Hb stable and no additional surgical intervention indicated  - was switched to eliquis but now holding AC on 3/11 due to drop in Hb. No evidence of active bleeding so will reassess H/H tomrrow and if stable will resume eliquis.    Acute hypoxemic respiratory failure  Patient with Hypercapnic and Hypoxic Respiratory failure which is Acute on chronic.  she is not on home oxygen. Supplemental oxygen was provided and noted-  .   Signs/symptoms of respiratory failure include- tachypnea, increased work of breathing and lethargy. Contributing diagnoses includes - CHF, Obesity Hypoventilation and Pneumonia Labs and images were reviewed. Patient Has recent ABG, which has been reviewed. Will treat underlying causes and adjust management of respiratory failure.    PLAN:  - increased from 2 L NC to 12 L NC on 3/78  - now weaned to 2 L NC  - CXR showed pulmonary  "edema with no consolidation  - switched to xoponex due to tremors on duonebs  - incentive spirometry   - mucinex and prn tessalon perles  - chest PT and acapella   - OOB to chair  - IMPROVING        Type 2 diabetes mellitus with chronic kidney disease, with long-term current use of insulin  Stable from hypoglycemia standpoint; continue levemir and LDSSI      Essential hypertension  -Continue home medications:  Amlodipine 10 mg daily, hydrochlorothiazide 25 mg daily, losartan 100 mg daily      PVD (peripheral vascular disease)  Cont. home medications:  Aspirin, cilostazol  Consult vascular surgery: Rec transfer to [facility] [place] for hybrid OR surgery 3/2/23 for RLE bypass tomorrow with Dr. Dunbar  - see "critical limb ischemia"      VTE Risk Mitigation (From admission, onward)         Ordered     apixaban tablet 5 mg  2 times daily         03/16/23 0959     Reason for No Pharmacological VTE Prophylaxis  Once        Question:  Reasons:  Answer:  IV Heparin w/in 24 hrs. Pre or Post-Op    03/02/23 1918     IP VTE HIGH RISK PATIENT  Once         03/02/23 1918     Place sequential compression device  Until discontinued         02/27/23 2200                Discharge Planning   JUAN ALBERTO: 3/17/2023     Code Status: Full Code   Is the patient medically ready for discharge?: No    Reason for patient still in hospital (select all that apply): Patient trending condition  Discharge Plan A: Long-term acute care facility (LTAC)   Discharge Delays: None known at this time              Jasbir Hayward MD  Department of Hospital Medicine   [facility]    "

## 2023-03-19 LAB
ABO GROUP BLD: NORMAL
ALBUMIN SERPL BCP-MCNC: 2 G/DL (ref 3.5–5.2)
ALP SERPL-CCNC: 69 U/L (ref 55–135)
ALT SERPL W/O P-5'-P-CCNC: 22 U/L (ref 10–44)
ANION GAP SERPL CALC-SCNC: 12 MMOL/L (ref 8–16)
AST SERPL-CCNC: 29 U/L (ref 10–40)
BASOPHILS # BLD AUTO: 0.04 K/UL (ref 0–0.2)
BASOPHILS NFR BLD: 0.3 % (ref 0–1.9)
BILIRUB SERPL-MCNC: 0.6 MG/DL (ref 0.1–1)
BLD GP AB SCN CELLS X3 SERPL QL: ABNORMAL
BLD PROD TYP BPU: NORMAL
BLOOD GROUP ANTIBODIES SERPL: NORMAL
BLOOD UNIT EXPIRATION DATE: NORMAL
BLOOD UNIT TYPE CODE: 7300
BLOOD UNIT TYPE: NORMAL
BUN SERPL-MCNC: 32 MG/DL (ref 8–23)
CALCIUM SERPL-MCNC: 9.2 MG/DL (ref 8.7–10.5)
CHLORIDE SERPL-SCNC: 104 MMOL/L (ref 95–110)
CO2 SERPL-SCNC: 28 MMOL/L (ref 23–29)
CODING SYSTEM: NORMAL
CREAT SERPL-MCNC: 0.9 MG/DL (ref 0.5–1.4)
CROSSMATCH INTERPRETATION: NORMAL
DIFFERENTIAL METHOD: ABNORMAL
DISPENSE STATUS: NORMAL
EOSINOPHIL # BLD AUTO: 0.3 K/UL (ref 0–0.5)
EOSINOPHIL NFR BLD: 2.5 % (ref 0–8)
ERYTHROCYTE [DISTWIDTH] IN BLOOD BY AUTOMATED COUNT: 15.4 % (ref 11.5–14.5)
EST. GFR  (NO RACE VARIABLE): >60 ML/MIN/1.73 M^2
GLUCOSE SERPL-MCNC: 177 MG/DL (ref 70–110)
HCT VFR BLD AUTO: 25 % (ref 37–48.5)
HGB BLD-MCNC: 7.3 G/DL (ref 12–16)
IMM GRANULOCYTES # BLD AUTO: 0.08 K/UL (ref 0–0.04)
IMM GRANULOCYTES NFR BLD AUTO: 0.6 % (ref 0–0.5)
LYMPHOCYTES # BLD AUTO: 1.1 K/UL (ref 1–4.8)
LYMPHOCYTES NFR BLD: 7.8 % (ref 18–48)
MCH RBC QN AUTO: 25.3 PG (ref 27–31)
MCHC RBC AUTO-ENTMCNC: 29.2 G/DL (ref 32–36)
MCV RBC AUTO: 87 FL (ref 82–98)
MONOCYTES # BLD AUTO: 0.7 K/UL (ref 0.3–1)
MONOCYTES NFR BLD: 5 % (ref 4–15)
NEUTROPHILS # BLD AUTO: 11.6 K/UL (ref 1.8–7.7)
NEUTROPHILS NFR BLD: 83.8 % (ref 38–73)
NRBC BLD-RTO: 0 /100 WBC
NUM UNITS TRANS PACKED RBC: NORMAL
PLATELET # BLD AUTO: 429 K/UL (ref 150–450)
PMV BLD AUTO: 9.7 FL (ref 9.2–12.9)
POCT GLUCOSE: 164 MG/DL (ref 70–110)
POCT GLUCOSE: 189 MG/DL (ref 70–110)
POCT GLUCOSE: 199 MG/DL (ref 70–110)
POCT GLUCOSE: 199 MG/DL (ref 70–110)
POCT GLUCOSE: 207 MG/DL (ref 70–110)
POTASSIUM SERPL-SCNC: 4 MMOL/L (ref 3.5–5.1)
PROT SERPL-MCNC: 7 G/DL (ref 6–8.4)
RBC # BLD AUTO: 2.89 M/UL (ref 4–5.4)
RH BLD: NORMAL
SODIUM SERPL-SCNC: 144 MMOL/L (ref 136–145)
WBC # BLD AUTO: 13.84 K/UL (ref 3.9–12.7)

## 2023-03-19 PROCEDURE — P9016 RBC LEUKOCYTES REDUCED: HCPCS | Performed by: STUDENT IN AN ORGANIZED HEALTH CARE EDUCATION/TRAINING PROGRAM

## 2023-03-19 PROCEDURE — 86870 RBC ANTIBODY IDENTIFICATION: CPT | Performed by: STUDENT IN AN ORGANIZED HEALTH CARE EDUCATION/TRAINING PROGRAM

## 2023-03-19 PROCEDURE — 85025 COMPLETE CBC W/AUTO DIFF WBC: CPT | Performed by: STUDENT IN AN ORGANIZED HEALTH CARE EDUCATION/TRAINING PROGRAM

## 2023-03-19 PROCEDURE — 99232 SBSQ HOSP IP/OBS MODERATE 35: CPT | Mod: GC,,, | Performed by: INTERNAL MEDICINE

## 2023-03-19 PROCEDURE — C9113 INJ PANTOPRAZOLE SODIUM, VIA: HCPCS | Performed by: INTERNAL MEDICINE

## 2023-03-19 PROCEDURE — 99232 SBSQ HOSP IP/OBS MODERATE 35: CPT | Mod: ,,, | Performed by: STUDENT IN AN ORGANIZED HEALTH CARE EDUCATION/TRAINING PROGRAM

## 2023-03-19 PROCEDURE — 86901 BLOOD TYPING SEROLOGIC RH(D): CPT | Performed by: STUDENT IN AN ORGANIZED HEALTH CARE EDUCATION/TRAINING PROGRAM

## 2023-03-19 PROCEDURE — 25000003 PHARM REV CODE 250: Performed by: INTERNAL MEDICINE

## 2023-03-19 PROCEDURE — 86900 BLOOD TYPING SEROLOGIC ABO: CPT | Performed by: STUDENT IN AN ORGANIZED HEALTH CARE EDUCATION/TRAINING PROGRAM

## 2023-03-19 PROCEDURE — 63600175 PHARM REV CODE 636 W HCPCS: Performed by: INTERNAL MEDICINE

## 2023-03-19 PROCEDURE — 20600001 HC STEP DOWN PRIVATE ROOM

## 2023-03-19 PROCEDURE — 36415 COLL VENOUS BLD VENIPUNCTURE: CPT | Performed by: STUDENT IN AN ORGANIZED HEALTH CARE EDUCATION/TRAINING PROGRAM

## 2023-03-19 PROCEDURE — 99232 PR SUBSEQUENT HOSPITAL CARE,LEVL II: ICD-10-PCS | Mod: ,,, | Performed by: STUDENT IN AN ORGANIZED HEALTH CARE EDUCATION/TRAINING PROGRAM

## 2023-03-19 PROCEDURE — 86850 RBC ANTIBODY SCREEN: CPT | Performed by: STUDENT IN AN ORGANIZED HEALTH CARE EDUCATION/TRAINING PROGRAM

## 2023-03-19 PROCEDURE — 86902 BLOOD TYPE ANTIGEN DONOR EA: CPT | Performed by: STUDENT IN AN ORGANIZED HEALTH CARE EDUCATION/TRAINING PROGRAM

## 2023-03-19 PROCEDURE — 25000003 PHARM REV CODE 250: Performed by: STUDENT IN AN ORGANIZED HEALTH CARE EDUCATION/TRAINING PROGRAM

## 2023-03-19 PROCEDURE — 80053 COMPREHEN METABOLIC PANEL: CPT | Performed by: STUDENT IN AN ORGANIZED HEALTH CARE EDUCATION/TRAINING PROGRAM

## 2023-03-19 PROCEDURE — 99232 PR SUBSEQUENT HOSPITAL CARE,LEVL II: ICD-10-PCS | Mod: GC,,, | Performed by: INTERNAL MEDICINE

## 2023-03-19 PROCEDURE — 86922 COMPATIBILITY TEST ANTIGLOB: CPT | Performed by: STUDENT IN AN ORGANIZED HEALTH CARE EDUCATION/TRAINING PROGRAM

## 2023-03-19 RX ORDER — HYDROCODONE BITARTRATE AND ACETAMINOPHEN 500; 5 MG/1; MG/1
TABLET ORAL
Status: DISCONTINUED | OUTPATIENT
Start: 2023-03-19 | End: 2023-03-20 | Stop reason: HOSPADM

## 2023-03-19 RX ADMIN — METHOCARBAMOL 500 MG: 500 TABLET ORAL at 08:03

## 2023-03-19 RX ADMIN — INSULIN ASPART 4 UNITS: 100 INJECTION, SOLUTION INTRAVENOUS; SUBCUTANEOUS at 08:03

## 2023-03-19 RX ADMIN — INSULIN DETEMIR 12 UNITS: 100 INJECTION, SOLUTION SUBCUTANEOUS at 08:03

## 2023-03-19 RX ADMIN — Medication 6 MG: at 08:03

## 2023-03-19 RX ADMIN — ATORVASTATIN CALCIUM 40 MG: 40 TABLET, FILM COATED ORAL at 08:03

## 2023-03-19 RX ADMIN — CIPROFLOXACIN 500 MG: 500 TABLET, FILM COATED ORAL at 08:03

## 2023-03-19 RX ADMIN — TRAMADOL HYDROCHLORIDE 50 MG: 50 TABLET, COATED ORAL at 08:03

## 2023-03-19 RX ADMIN — CLOPIDOGREL BISULFATE 75 MG: 75 TABLET ORAL at 08:03

## 2023-03-19 RX ADMIN — AMLODIPINE BESYLATE 10 MG: 10 TABLET ORAL at 08:03

## 2023-03-19 RX ADMIN — FUROSEMIDE 40 MG: 40 TABLET ORAL at 06:03

## 2023-03-19 RX ADMIN — APIXABAN 5 MG: 5 TABLET, FILM COATED ORAL at 08:03

## 2023-03-19 RX ADMIN — METHOCARBAMOL 500 MG: 500 TABLET ORAL at 05:03

## 2023-03-19 RX ADMIN — METHOCARBAMOL 500 MG: 500 TABLET ORAL at 01:03

## 2023-03-19 RX ADMIN — ASPIRIN 81 MG: 81 TABLET, COATED ORAL at 08:03

## 2023-03-19 RX ADMIN — PANTOPRAZOLE SODIUM 40 MG: 40 INJECTION, POWDER, FOR SOLUTION INTRAVENOUS at 05:03

## 2023-03-19 RX ADMIN — FUROSEMIDE 40 MG: 40 TABLET ORAL at 08:03

## 2023-03-19 RX ADMIN — DULOXETINE 30 MG: 30 CAPSULE, DELAYED RELEASE ORAL at 08:03

## 2023-03-19 RX ADMIN — PANTOPRAZOLE SODIUM 40 MG: 40 INJECTION, POWDER, FOR SOLUTION INTRAVENOUS at 09:03

## 2023-03-19 NOTE — PROGRESS NOTES
[facility]  Endocrinology  Progress Note    Admit Date: 2/27/2023     Reason for Consult: Management of T2DM, Hyperglycemia     Surgical Procedure and Date: s/p  Left external iliac to right SFA bypass with 8mm PTFE; Left common and external iliac artery angioplasty and stenting, 8mm Lifestent; Aortogram, pelvic angiogram; Right lower extremity angiogram; and Right SFA angioplasty with plain and drug coated 4mm balloon on 3/8/2023    Diabetes diagnosis year: approx 26 years ago    Home Diabetes Medications:  Glipizide 10 mg daily, Novolog-NPH 70/30 50 units BID (prior Metformin but no longer) - uncertain why both sulfonylurea and insulin    How often checking glucose at home? 1-3 x day   BG readings on regimen: 100-200  Hypoglycemia on the regimen?  Yes at times  Missed doses on regimen?  No    Diabetes Complications include:     Hyperglycemia and Diabetic dermatitis    Complicating diabetes co morbidities:   HTN; DVT; CHF    HPI: This is an 82-year-old female with a past medical history of severe PAD, hypertension, and insulin dependant type 2 diabetes, history of DVT who presents with right foot pain. Patient reports worsening right foot pain, swelling and discoloration that started after she had an ingrown toenail removed by her podiatrist.  She reports that her discoloration started since 2/7. Of note, the patient underwent an angiogram on 02/23 by vascular surgery. In the ED, she was tachycardic (112), and hypertensive.  Labs showed microcytic anemia (10.2), elevated sed rate (112), elevated CRP (56.5), elevated lactic acid (2.5).  Foot x-ray showed DJD and spurs on the calcaneus with no acute process. Vascular surgery recommended  transfer to [facility] [place] surgery 3/2/23. Now s/p  Left external iliac to right SFA bypass with 8mm PTFE; Left common and external iliac artery angioplasty and stenting, 8mm Lifestent; Aortogram, pelvic angiogram; Right lower extremity angiogram; and Right SFA angioplasty  "with plain and drug coated 4mm balloon on 3/8/2023. Endocrine consulted to manage hyperglycemia and type 2 diabetes. Primary requesting BG not exceed 180 mg/dL.      Lab Results   Component Value Date    HGBA1C 8.2 (H) 02/28/2023     On evaluation the patient confirms that she has been a diabetic for 26 years.  She is on both Glipizide and 70/30 insulin.  Managed by her PCP.  She denies any issues with hypoglycemia or symptoms concerning for this.  Family history of diabetes includes multiple sisters and brothers.        Interval HPI:   Blood sugars slightly above goal in the last 12 hours    /60   Pulse 84   Temp 98.2 °F (36.8 °C)   Resp 16   Ht 5' 4" (1.626 m)   Wt 73.5 kg (162 lb 0.6 oz)   LMP  (LMP Unknown)   SpO2 96%   BMI 27.81 kg/m²     Labs Reviewed and Include    Recent Labs   Lab 03/19/23  0157   *   CALCIUM 9.2   ALBUMIN 2.0*   PROT 7.0      K 4.0   CO2 28      BUN 32*   CREATININE 0.9   ALKPHOS 69   ALT 22   AST 29   BILITOT 0.6     Lab Results   Component Value Date    WBC 13.84 (H) 03/19/2023    HGB 7.3 (L) 03/19/2023    HCT 25.0 (L) 03/19/2023    MCV 87 03/19/2023     03/19/2023     No results for input(s): TSH, FREET4 in the last 168 hours.  Lab Results   Component Value Date    HGBA1C 8.2 (H) 02/28/2023       Nutritional status:   Body mass index is 27.81 kg/m².  Lab Results   Component Value Date    ALBUMIN 2.0 (L) 03/19/2023    ALBUMIN 1.9 (L) 03/18/2023    ALBUMIN 1.8 (L) 03/17/2023     No results found for: PREALBUMIN    Estimated Creatinine Clearance: 47.3 mL/min (based on SCr of 0.9 mg/dL).    Accu-Checks  Recent Labs     03/17/23  0533 03/17/23  1616 03/17/23  2137 03/18/23  0624 03/18/23  1222 03/18/23  1712 03/18/23  2050 03/18/23  2327 03/19/23  0357 03/19/23  0746   POCTGLUCOSE 156* 152* 145* 136* 134* 255* 200* 204* 207* 199*       Current Medications and/or Treatments Impacting Glycemic Control  Immunotherapy:    Immunosuppressants       None      "     Steroids:   Hormones (From admission, onward)      Start     Stop Route Frequency Ordered    03/09/23 2100  melatonin tablet 6 mg         -- Oral Nightly 03/09/23 1546          Pressors:    Autonomic Drugs (From admission, onward)      None          Hyperglycemia/Diabetes Medications:   Antihyperglycemics (From admission, onward)      Start     Stop Route Frequency Ordered    03/13/23 1200  insulin detemir U-100 pen 12 Units         -- SubQ Daily 03/13/23 1057    03/13/23 1130  insulin aspart U-100 pen 4 Units         -- SubQ 3 times daily with meals 03/13/23 1058    03/12/23 0645  insulin aspart U-100 pen 0-5 Units         -- SubQ Before meals & nightly 03/11/23 6589            ASSESSMENT and PLAN    Cardiac/Vascular  * Critical limb ischemia with history of revascularization of same extremity  - Optimize blood glucose control to improve wound healing    Endocrine  Hypoglycemia  - no episodes in the last 24 hours  - caution with insulin titration considering age, kidney function and variable appetite     Type 2 diabetes mellitus with chronic kidney disease, with long-term current use of insulin  - Blood sugars mostly around goal in the last 24 hours, Variable appetite observed, but generally low appetite   - Levemir 12 units daily  - Aspart 4 units before meals  - Low dose correction scale insulin as needed  - Only give correction scale insulin for meal time when patient is NPO  - BG checks AC/HS and at 2 am  - Hypoglycemia protocol in place  - If blood glucose greater than 300, please ask patient not to eat food or drink anything other than water until correctional insulin has brought it back below 250  - Please notify Endocrine for any change and/or advance in diet    GI  Upper GI bleed  - management by gastroenterology team  - no plans of endoscopy currently        Aditya Farrell MD  Endocrinology  [facility]

## 2023-03-19 NOTE — SUBJECTIVE & OBJECTIVE
Interval History: Hb 7.3. Discussed with GI, no plans for EGD, rec ordering 1u pRBC.     Review of Systems  Objective:     Vital Signs (Most Recent):  Temp: 98.2 °F (36.8 °C) (03/19/23 0749)  Pulse: 84 (03/19/23 0749)  Resp: 16 (03/19/23 0749)  BP: 127/60 (03/19/23 0749)  SpO2: 96 % (03/19/23 0749) Vital Signs (24h Range):  Temp:  [97.4 °F (36.3 °C)-98.7 °F (37.1 °C)] 98.2 °F (36.8 °C)  Pulse:  [84-88] 84  Resp:  [16-20] 16  SpO2:  [94 %-98 %] 96 %  BP: (127-147)/(60-67) 127/60     Weight: 73.5 kg (162 lb 0.6 oz)  Body mass index is 27.81 kg/m².  No intake or output data in the 24 hours ending 03/19/23 1159   Physical Exam  Constitutional:       Appearance: Normal appearance.   HENT:      Head: Normocephalic.      Mouth/Throat:      Mouth: Mucous membranes are moist.   Eyes:      Extraocular Movements: Extraocular movements intact.      Pupils: Pupils are equal, round, and reactive to light.   Cardiovascular:      Rate and Rhythm: Normal rate and regular rhythm.      Pulses: Normal pulses.      Heart sounds: Normal heart sounds.   Pulmonary:      Effort: Pulmonary effort is normal.      Breath sounds: Normal breath sounds.   Abdominal:      General: Bowel sounds are normal. There is no distension.      Palpations: Abdomen is soft.      Tenderness: There is no abdominal tenderness.   Musculoskeletal:         General: Normal range of motion.      Cervical back: Neck supple.      Comments: Right TMA with dressing c/d/I and surgical shoe in place   Neurological:      General: No focal deficit present.      Mental Status: She is alert.   Psychiatric:         Mood and Affect: Mood normal.

## 2023-03-19 NOTE — PROGRESS NOTES
[facility] Medicine  Progress Note    Patient Name: Nelsy Marino  MRN: [id number]  Patient Class: IP- Inpatient   Admission Date: 2/27/2023  Length of Stay: 20 days  Attending Physician: Jasbir Hayward MD  Primary Care Provider: Juan F Garay        Subjective:     Principal Problem:Critical limb ischemia with history of revascularization of same extremity        HPI:  This is an 82-year-old female with a past medical history of severe PAD, hypertension, type 2 diabetes, history of DVT who presents with right foot pain.    Patient reports worsening right foot pain, swelling and discoloration that started after she had an ingrown toenail removed by her podiatrist.  She reports that her discoloration started since 2/7. Of note, the patient underwent an angiogram on 02/23 by vascular surgery. In the ED, she was tachycardic (112), and hypertensive.  Labs showed microcytic anemia (10.2), elevated sed rate (112), elevated CRP (56.5), elevated lactic acid (2.5).  Foot x-ray showed DJD and spurs on the calcaneus with no acute process.  Vascular surgery was consulted and recommended continuing antibiotics and evaluating in the morning.  She was given morphine 4 mg IV, vancomycin, Zosyn and was admitted for further management.      Overview/Hospital Course:   82-year-old female with a past medical history of severe PAD, hypertension, type 2 diabetes, history of DVT admitted on 02/27/2023 for gangrene of right toes. Of note, patient recent underwent LE angiogram by Dr. Tran on 02/23/23. Per OP report, was able to get successful revascularization / resolution of stenosis. XR right foot with no fracture, dislocation, bone destruction, or foreign body seen.  There is DJD and spurs on the calcaneus.  Was started on IV Zosyn and vancomycin on admit. Blood  cx with NGTD. On 02/28, patient with hypoglycemia but was asymptomatic. Improved with glucogen. Likely due to NPO status and was still receiving  insulin.  Cardiology consulted for preoperative clearance.  Recommended stress test.  Normal myocardial perfusion scan.  Cardiology deem patient at acceptable cardiac risk for vasc surgery/anesthesia.  Vascular surgery consulted and patient underwent fem fem bypass on 3/2/2023. Podiatry also consulted and patient underwent right TMA on 3/4/23.     Stepped down to Hospital Medicine service. O2 requirement steadily improving while on diuresis.  Patient notes significant posterior ankle pain. Increased tramadol and ordered robaxin. Good urine output. Patient continues to note sleep disturbances mainly due to BLE pain. Ordering transfusion for Hb 6.4. WBC continues to rise from 13 to 15. Per vascular surgery, continuing wound care for right TMA and no evidence of active infection at the surgical site. Per ID, continuing supportive care and diuresis and discontinuing IV zosyn. Also will obtain respiratory cultures, order C diff panel and followup CT chest.            Interval History: Hb 7.3. Discussed with GI, no plans for EGD, rec ordering 1u pRBC.     Review of Systems  Objective:     Vital Signs (Most Recent):  Temp: 98.2 °F (36.8 °C) (03/19/23 0749)  Pulse: 84 (03/19/23 0749)  Resp: 16 (03/19/23 0749)  BP: 127/60 (03/19/23 0749)  SpO2: 96 % (03/19/23 0749) Vital Signs (24h Range):  Temp:  [97.4 °F (36.3 °C)-98.7 °F (37.1 °C)] 98.2 °F (36.8 °C)  Pulse:  [84-88] 84  Resp:  [16-20] 16  SpO2:  [94 %-98 %] 96 %  BP: (127-147)/(60-67) 127/60     Weight: 73.5 kg (162 lb 0.6 oz)  Body mass index is 27.81 kg/m².  No intake or output data in the 24 hours ending 03/19/23 1159   Physical Exam  Constitutional:       Appearance: Normal appearance.   HENT:      Head: Normocephalic.      Mouth/Throat:      Mouth: Mucous membranes are moist.   Eyes:      Extraocular Movements: Extraocular movements intact.      Pupils: Pupils are equal, round, and reactive to light.   Cardiovascular:      Rate and Rhythm: Normal rate and regular  rhythm.      Pulses: Normal pulses.      Heart sounds: Normal heart sounds.   Pulmonary:      Effort: Pulmonary effort is normal.      Breath sounds: Normal breath sounds.   Abdominal:      General: Bowel sounds are normal. There is no distension.      Palpations: Abdomen is soft.      Tenderness: There is no abdominal tenderness.   Musculoskeletal:         General: Normal range of motion.      Cervical back: Neck supple.      Comments: Right TMA with dressing c/d/I and surgical shoe in place   Neurological:      General: No focal deficit present.      Mental Status: She is alert.   Psychiatric:         Mood and Affect: Mood normal.           Assessment/Plan:      * Critical limb ischemia with history of revascularization of same extremity  -Vascular surgery consult. apprec recs  -- s/p fem fem bypass on 3/2/2023  -- s/p right TMA on 3/4/2023.   -ID consulted. apprec recs  -- switched Meropenem to ciprofloxacin 500 mg q12 hours to finish total 14 days of therapy for pseudomonas fluorescens SSTI. EOT 3/23/23. After this point, patient can resume her cephalexin 1000 mg BID Ppx regimen for chronic suppression of prior graft infection.   -- Q4H neurovasc checks  -- ASA/plavix/Statin/Eliquis. Continuing AC and DAPT in the setting of GIB due to recent bypass  -- PT/OT  --Recommend PRBC transfusion  --Consider GI eval or imaging   --UA cultures negative, blood cultures no growth to date, wound cultures NGTD, cdiff pending.  -Podiatry consulted. apprec recs  --Dressing changes by nursing.  --RLE WB to heel in surgical shoe for transfers or short distances.   --Elevate RLE at all times while in bed or chair.  -- Continuing to monitor right TMA site. If foot worsens or no other source is found then may consider opening of incision and exploration of surgical wound   - outpatient podiatry wound care instructions when patient medically ready for discharge: Please order ambulatory referral to podiatry for outpatient followup.  "RLE WB to heel in surgical shoe for transfers or short distances. HH/facility dressing changes R foot 3x/week: cleanse incision with saline, apply betadine soaked adaptic, apply gauze, wrap with cast padding and coban.       Melena  - episodes of melena while on Eliquis and DAPT  - s/p 2 units pRBC on 3/11 for Hb 5.9  - GI consulted. apprec recs  -- No plans for EGD. Ordered 1 unit pRBC on 3/19  -- Given recent SFA bypass, may have to continue anticoagulation since risks of stopping outweigh benefits.   --  pantoprazole 40mg BID  -- Please correct any coagulopathy with platelets and FFP for goal of platelets >50K and INR <2.0      Upper GI bleed  - see "Melena"        Bilious vomiting  - reports regular bowel movements  - bili normal, AST/ALT normal   - clear liquid diet      Acute encephalopathy  - on 3/12, patient more somnolent and had tremors  - discontinued gabapentin and depakote  - Scr back to baseline so low suspicion for uremia   - continue antibiotic for possible infection   - RESOLVED      Leukocytosis  - see "sepsis"  - IMPROVING      S/P transmetatarsal amputation of foot, right  - stable  - appreciated vascular surgery and podiatry recs  -- s/p TMA 3/4  -- low threshold to escalate antibiotics. If her graft becomes infected and requires antibiotics, she has no other options left for revascularizations  -- Q4H neurovasc checks  -- ASA/plavix/Statin/Eliquis   -- PT/OT  --Dressing changes by nursing.  --RLE WB to heel in surgical shoe for transfers or short distances.   --Elevate RLE at all times while in bed or chair.  -- DC Instructions: Please order ambulatory referral to podiatry for outpatient followup. RLE WB to heel in surgical shoe for transfers or short distances. HH/facility dressing changes R foot 3x/week: cleanse incision with saline, apply betadine soaked adaptic, apply gauze, wrap with cast padding and coban.   - scheduled tylenol and prn tramadol      Vascular graft infection  - on chronic " suppression with keflex  - escalated to zosyn due to increased oxygen requirement to cover to possible PNA  - Per ID, discontinued zosyn per ID. completed linezolid. Meropenem can be transitioned to ciprofloxacin 500 mg q12 hours to finish total 14 days of therapy for pseudomonas fluorescens SSTI. EOT 3/23/23. After this point, patient can resume her cephalexin 1000 mg BID Ppx regimen for chronic suppression of prior graft infection.       Hypoglycemia  Better now  Continue levemir and LDSS    Sepsis  -Right foot appears to have wet gangrene on exam. S/p right TMA on 3/4  - fever 100.5 on 3/9  - WBC risin --> 15 --> 18  - procal slightly elevated at 0.65  - ESR 64,   - per vascular surgery, right TMA does not appear infected  - per podiatry, if no other infectious source identified, will consider opening of incision and exploration of surgical wound.   - ID consulted. apprec recs  -- discontinued IV zosyn. Started linezolid to cover for SSTI  -- continue supportive care  -- blood culture NGTD  - stool sample for c diff panel could not be obtained   -- followup sputum culture, RIP  -- CT C/A/P negative for infection        History of DVT (deep vein thrombosis)  Not on AC outpatient.    - heparin gtt in replace of eliquis for now until Hb stable and no additional surgical intervention indicated  - was switched to eliquis but now holding AC on 3/11 due to drop in Hb. No evidence of active bleeding so will reassess H/H tomrrow and if stable will resume eliquis.    Acute hypoxemic respiratory failure  Patient with Hypercapnic and Hypoxic Respiratory failure which is Acute on chronic.  she is not on home oxygen. Supplemental oxygen was provided and noted-  .   Signs/symptoms of respiratory failure include- tachypnea, increased work of breathing and lethargy. Contributing diagnoses includes - CHF, Obesity Hypoventilation and Pneumonia Labs and images were reviewed. Patient Has recent ABG, which has been  "reviewed. Will treat underlying causes and adjust management of respiratory failure.    PLAN:  - increased from 2 L NC to 12 L NC on 3/78  - now weaned to 2 L NC  - CXR showed pulmonary edema with no consolidation  - switched to xoponex due to tremors on duonebs  - incentive spirometry   - mucinex and prn tessalon perles  - chest PT and acapella   - OOB to chair  - IMPROVING        Type 2 diabetes mellitus with chronic kidney disease, with long-term current use of insulin  Stable from hypoglycemia standpoint; continue levemir and LDSSI      Essential hypertension  -Continue home medications:  Amlodipine 10 mg daily, hydrochlorothiazide 25 mg daily, losartan 100 mg daily      PVD (peripheral vascular disease)  Cont. home medications:  Aspirin, cilostazol  Consult vascular surgery: Rec transfer to [facility] [place] for hybrid OR surgery 3/2/23 for RLE bypass tomorrow with Dr. Dunbar  - see "critical limb ischemia"      VTE Risk Mitigation (From admission, onward)         Ordered     apixaban tablet 5 mg  2 times daily         03/16/23 0959     Reason for No Pharmacological VTE Prophylaxis  Once        Question:  Reasons:  Answer:  IV Heparin w/in 24 hrs. Pre or Post-Op    03/02/23 1918     IP VTE HIGH RISK PATIENT  Once         03/02/23 1918     Place sequential compression device  Until discontinued         02/27/23 2200                Discharge Planning   JUAN ALBERTO: 3/17/2023     Code Status: Full Code   Is the patient medically ready for discharge?: No    Reason for patient still in hospital (select all that apply): Patient trending condition  Discharge Plan A: Long-term acute care facility (LTAC)   Discharge Delays: None known at this time              Jasbir Hayward MD  Department of Hospital Medicine   [facility]    "

## 2023-03-19 NOTE — TREATMENT PLAN
BRIEF GI TREATMENT PLAN    No more melena Hb stable at 7.4>7.2>7.3.     Vitals:    03/19/23 1403 03/19/23 1405 03/19/23 1445 03/19/23 1550   BP: (!) 126/58  138/63 137/62   BP Location:       Patient Position:       Pulse: 88 89 85 85   Resp:  17 16 17   Temp:  97.8 °F (36.6 °C) 97.4 °F (36.3 °C) 96.8 °F (36 °C)  Comment: retaken at 98.8   TempSrc:    Oral   SpO2:  97% 98% 95%   Weight:       Height:            Assessment/Plan:      82-year-old lady with an extensive past medical history including recent transmetatarsal amputation and SSA artery bypass (currently on aspirin, Plavix and Eliquis) on whom GI has been consulted for melena. Hb down from 9s>7. VSS. Hb stable 7.2>7.3.     #melena   #bilious vomiting      PLAN:  - No plan for EGD at this time  - Trend CBC daily. Transfuse for Hgb <7, unless otherwise indicated  - Maintain IV access with 2 large bore Ivs. Intravascular         resuscitation/support with IVFs   - Advance diet  - Given recent SFA bypass, may have to continue anticoagulation since risks of stopping outweigh benefits.   -PO pantoprazole 40mg BID. Continue for 8 weeks.   - Please correct any coagulopathy with platelets and FFP for goal of platelets >50K and INR <2.0  - Please notify GI team if there is significant change in patient's clinical status   Mckay Moe MD  PGY-IV, GI

## 2023-03-19 NOTE — PLAN OF CARE
No significant events. Pt received RBC's, tolerated well. No adverse affects reported. Pt states she has not been having an appetite. Only small amounts of diet tolerated. Pt states she feels better after receiving RBC's but still a little tired. Bed in lowest position. HOB elevated. Call light within reach. Family to remain at bedside.      Problem: Adult Inpatient Plan of Care  Goal: Plan of Care Review  Outcome: Ongoing, Progressing  Goal: Patient-Specific Goal (Individualized)  Outcome: Ongoing, Progressing  Goal: Absence of Hospital-Acquired Illness or Injury  Outcome: Ongoing, Progressing  Goal: Optimal Comfort and Wellbeing  Outcome: Ongoing, Progressing

## 2023-03-19 NOTE — ASSESSMENT & PLAN NOTE
- episodes of melena while on Eliquis and DAPT  - s/p 2 units pRBC on 3/11 for Hb 5.9  - GI consulted. apprec recs  -- No plans for EGD. Ordered 1 unit pRBC on 3/19  -- Given recent SFA bypass, may have to continue anticoagulation since risks of stopping outweigh benefits.   --  pantoprazole 40mg BID  -- Please correct any coagulopathy with platelets and FFP for goal of platelets >50K and INR <2.0

## 2023-03-19 NOTE — SUBJECTIVE & OBJECTIVE
"Interval HPI:   Blood sugars slightly above goal in the last 12 hours    /60   Pulse 84   Temp 98.2 °F (36.8 °C)   Resp 16   Ht 5' 4" (1.626 m)   Wt 73.5 kg (162 lb 0.6 oz)   LMP  (LMP Unknown)   SpO2 96%   BMI 27.81 kg/m²     Labs Reviewed and Include    Recent Labs   Lab 03/19/23  0157   *   CALCIUM 9.2   ALBUMIN 2.0*   PROT 7.0      K 4.0   CO2 28      BUN 32*   CREATININE 0.9   ALKPHOS 69   ALT 22   AST 29   BILITOT 0.6     Lab Results   Component Value Date    WBC 13.84 (H) 03/19/2023    HGB 7.3 (L) 03/19/2023    HCT 25.0 (L) 03/19/2023    MCV 87 03/19/2023     03/19/2023     No results for input(s): TSH, FREET4 in the last 168 hours.  Lab Results   Component Value Date    HGBA1C 8.2 (H) 02/28/2023       Nutritional status:   Body mass index is 27.81 kg/m².  Lab Results   Component Value Date    ALBUMIN 2.0 (L) 03/19/2023    ALBUMIN 1.9 (L) 03/18/2023    ALBUMIN 1.8 (L) 03/17/2023     No results found for: PREALBUMIN    Estimated Creatinine Clearance: 47.3 mL/min (based on SCr of 0.9 mg/dL).    Accu-Checks  Recent Labs     03/17/23  0533 03/17/23  1616 03/17/23  2137 03/18/23  0624 03/18/23  1222 03/18/23  1712 03/18/23  2050 03/18/23  2327 03/19/23  0357 03/19/23  0746   POCTGLUCOSE 156* 152* 145* 136* 134* 255* 200* 204* 207* 199*       Current Medications and/or Treatments Impacting Glycemic Control  Immunotherapy:    Immunosuppressants       None          Steroids:   Hormones (From admission, onward)      Start     Stop Route Frequency Ordered    03/09/23 2100  melatonin tablet 6 mg         -- Oral Nightly 03/09/23 1546          Pressors:    Autonomic Drugs (From admission, onward)      None          Hyperglycemia/Diabetes Medications:   Antihyperglycemics (From admission, onward)      Start     Stop Route Frequency Ordered    03/13/23 1200  insulin detemir U-100 pen 12 Units         -- SubQ Daily 03/13/23 1057    03/13/23 1130  insulin aspart U-100 pen 4 Units      "    -- SubQ 3 times daily with meals 03/13/23 1058    03/12/23 0645  insulin aspart U-100 pen 0-5 Units         -- SubQ Before meals & nightly 03/11/23 4706

## 2023-03-20 VITALS
TEMPERATURE: 98 F | BODY MASS INDEX: 25.21 KG/M2 | HEIGHT: 64 IN | DIASTOLIC BLOOD PRESSURE: 61 MMHG | OXYGEN SATURATION: 93 % | RESPIRATION RATE: 18 BRPM | SYSTOLIC BLOOD PRESSURE: 124 MMHG | WEIGHT: 147.69 LBS | HEART RATE: 89 BPM

## 2023-03-20 PROBLEM — E16.2 HYPOGLYCEMIA: Status: RESOLVED | Noted: 2023-02-28 | Resolved: 2023-03-20

## 2023-03-20 LAB
ALBUMIN SERPL BCP-MCNC: 2.1 G/DL (ref 3.5–5.2)
ALP SERPL-CCNC: 62 U/L (ref 55–135)
ALT SERPL W/O P-5'-P-CCNC: 21 U/L (ref 10–44)
ANION GAP SERPL CALC-SCNC: 7 MMOL/L (ref 8–16)
AST SERPL-CCNC: 28 U/L (ref 10–40)
BASOPHILS # BLD AUTO: 0.03 K/UL (ref 0–0.2)
BASOPHILS NFR BLD: 0.2 % (ref 0–1.9)
BILIRUB SERPL-MCNC: 0.7 MG/DL (ref 0.1–1)
BUN SERPL-MCNC: 25 MG/DL (ref 8–23)
CALCIUM SERPL-MCNC: 9.1 MG/DL (ref 8.7–10.5)
CHLORIDE SERPL-SCNC: 104 MMOL/L (ref 95–110)
CO2 SERPL-SCNC: 30 MMOL/L (ref 23–29)
CREAT SERPL-MCNC: 0.8 MG/DL (ref 0.5–1.4)
DIFFERENTIAL METHOD: ABNORMAL
EOSINOPHIL # BLD AUTO: 0.3 K/UL (ref 0–0.5)
EOSINOPHIL NFR BLD: 2.1 % (ref 0–8)
ERYTHROCYTE [DISTWIDTH] IN BLOOD BY AUTOMATED COUNT: 15 % (ref 11.5–14.5)
EST. GFR  (NO RACE VARIABLE): >60 ML/MIN/1.73 M^2
GLUCOSE SERPL-MCNC: 168 MG/DL (ref 70–110)
HCT VFR BLD AUTO: 28.2 % (ref 37–48.5)
HGB BLD-MCNC: 8.6 G/DL (ref 12–16)
IMM GRANULOCYTES # BLD AUTO: 0.1 K/UL (ref 0–0.04)
IMM GRANULOCYTES NFR BLD AUTO: 0.8 % (ref 0–0.5)
LYMPHOCYTES # BLD AUTO: 1.1 K/UL (ref 1–4.8)
LYMPHOCYTES NFR BLD: 8 % (ref 18–48)
MCH RBC QN AUTO: 26.3 PG (ref 27–31)
MCHC RBC AUTO-ENTMCNC: 30.5 G/DL (ref 32–36)
MCV RBC AUTO: 86 FL (ref 82–98)
MONOCYTES # BLD AUTO: 0.8 K/UL (ref 0.3–1)
MONOCYTES NFR BLD: 6.2 % (ref 4–15)
NEUTROPHILS # BLD AUTO: 10.8 K/UL (ref 1.8–7.7)
NEUTROPHILS NFR BLD: 82.7 % (ref 38–73)
NRBC BLD-RTO: 0 /100 WBC
PLATELET # BLD AUTO: 366 K/UL (ref 150–450)
PMV BLD AUTO: 10.1 FL (ref 9.2–12.9)
POCT GLUCOSE: 150 MG/DL (ref 70–110)
POCT GLUCOSE: 155 MG/DL (ref 70–110)
POCT GLUCOSE: 183 MG/DL (ref 70–110)
POCT GLUCOSE: 201 MG/DL (ref 70–110)
POTASSIUM SERPL-SCNC: 3.8 MMOL/L (ref 3.5–5.1)
PROT SERPL-MCNC: 7.1 G/DL (ref 6–8.4)
RBC # BLD AUTO: 3.27 M/UL (ref 4–5.4)
SODIUM SERPL-SCNC: 141 MMOL/L (ref 136–145)
WBC # BLD AUTO: 13.05 K/UL (ref 3.9–12.7)

## 2023-03-20 PROCEDURE — 99900035 HC TECH TIME PER 15 MIN (STAT)

## 2023-03-20 PROCEDURE — C9113 INJ PANTOPRAZOLE SODIUM, VIA: HCPCS | Performed by: INTERNAL MEDICINE

## 2023-03-20 PROCEDURE — 99239 HOSP IP/OBS DSCHRG MGMT >30: CPT | Mod: ,,, | Performed by: STUDENT IN AN ORGANIZED HEALTH CARE EDUCATION/TRAINING PROGRAM

## 2023-03-20 PROCEDURE — 25000003 PHARM REV CODE 250: Performed by: INTERNAL MEDICINE

## 2023-03-20 PROCEDURE — 97530 THERAPEUTIC ACTIVITIES: CPT

## 2023-03-20 PROCEDURE — 99232 PR SUBSEQUENT HOSPITAL CARE,LEVL II: ICD-10-PCS | Mod: GC,,, | Performed by: INTERNAL MEDICINE

## 2023-03-20 PROCEDURE — 85025 COMPLETE CBC W/AUTO DIFF WBC: CPT | Performed by: STUDENT IN AN ORGANIZED HEALTH CARE EDUCATION/TRAINING PROGRAM

## 2023-03-20 PROCEDURE — 27000221 HC OXYGEN, UP TO 24 HOURS

## 2023-03-20 PROCEDURE — 80053 COMPREHEN METABOLIC PANEL: CPT | Performed by: STUDENT IN AN ORGANIZED HEALTH CARE EDUCATION/TRAINING PROGRAM

## 2023-03-20 PROCEDURE — 25000003 PHARM REV CODE 250: Performed by: STUDENT IN AN ORGANIZED HEALTH CARE EDUCATION/TRAINING PROGRAM

## 2023-03-20 PROCEDURE — 63600175 PHARM REV CODE 636 W HCPCS: Performed by: INTERNAL MEDICINE

## 2023-03-20 PROCEDURE — 99239 PR HOSPITAL DISCHARGE DAY,>30 MIN: ICD-10-PCS | Mod: ,,, | Performed by: STUDENT IN AN ORGANIZED HEALTH CARE EDUCATION/TRAINING PROGRAM

## 2023-03-20 PROCEDURE — 99232 SBSQ HOSP IP/OBS MODERATE 35: CPT | Mod: GC,,, | Performed by: INTERNAL MEDICINE

## 2023-03-20 PROCEDURE — 1111F DSCHRG MED/CURRENT MED MERGE: CPT | Mod: CPTII,,, | Performed by: STUDENT IN AN ORGANIZED HEALTH CARE EDUCATION/TRAINING PROGRAM

## 2023-03-20 PROCEDURE — 36415 COLL VENOUS BLD VENIPUNCTURE: CPT | Performed by: STUDENT IN AN ORGANIZED HEALTH CARE EDUCATION/TRAINING PROGRAM

## 2023-03-20 PROCEDURE — 97116 GAIT TRAINING THERAPY: CPT

## 2023-03-20 PROCEDURE — 1111F PR DISCHARGE MEDS RECONCILED W/ CURRENT OUTPATIENT MED LIST: ICD-10-PCS | Mod: CPTII,,, | Performed by: STUDENT IN AN ORGANIZED HEALTH CARE EDUCATION/TRAINING PROGRAM

## 2023-03-20 RX ORDER — PANTOPRAZOLE SODIUM 40 MG/1
40 TABLET, DELAYED RELEASE ORAL 2 TIMES DAILY
Qty: 60 TABLET | Refills: 1 | Status: ON HOLD | OUTPATIENT
Start: 2023-03-20 | End: 2023-04-29 | Stop reason: HOSPADM

## 2023-03-20 RX ORDER — CLOPIDOGREL BISULFATE 75 MG/1
75 TABLET ORAL DAILY
Qty: 30 TABLET | Refills: 0 | Status: ON HOLD | OUTPATIENT
Start: 2023-03-20 | End: 2023-04-30 | Stop reason: HOSPADM

## 2023-03-20 RX ORDER — ATORVASTATIN CALCIUM 40 MG/1
40 TABLET, FILM COATED ORAL NIGHTLY
Qty: 30 TABLET | Refills: 0 | Status: ON HOLD | OUTPATIENT
Start: 2023-03-20 | End: 2023-08-03 | Stop reason: HOSPADM

## 2023-03-20 RX ORDER — METHOCARBAMOL 500 MG/1
500 TABLET, FILM COATED ORAL 4 TIMES DAILY
Qty: 28 TABLET | Refills: 0 | Status: SHIPPED | OUTPATIENT
Start: 2023-03-20 | End: 2023-03-27

## 2023-03-20 RX ORDER — INSULIN ASPART 100 [IU]/ML
4 INJECTION, SOLUTION INTRAVENOUS; SUBCUTANEOUS 3 TIMES DAILY
Qty: 9 ML | Refills: 0 | Status: SHIPPED | OUTPATIENT
Start: 2023-03-20 | End: 2023-03-20 | Stop reason: HOSPADM

## 2023-03-20 RX ORDER — FUROSEMIDE 20 MG/1
20 TABLET ORAL DAILY
Qty: 30 TABLET | Refills: 0 | Status: ON HOLD | OUTPATIENT
Start: 2023-03-20 | End: 2023-05-03 | Stop reason: HOSPADM

## 2023-03-20 RX ORDER — INSULIN ASPART 100 [IU]/ML
12 INJECTION, SUSPENSION SUBCUTANEOUS
Refills: 0 | Status: ON HOLD
Start: 2023-03-20 | End: 2023-05-03 | Stop reason: HOSPADM

## 2023-03-20 RX ORDER — BENZONATATE 100 MG/1
100 CAPSULE ORAL 3 TIMES DAILY PRN
Qty: 20 CAPSULE | Refills: 0 | Status: ON HOLD | OUTPATIENT
Start: 2023-03-20 | End: 2023-04-11

## 2023-03-20 RX ORDER — CIPROFLOXACIN 500 MG/1
500 TABLET ORAL EVERY 12 HOURS
Qty: 6 TABLET | Refills: 0 | Status: SHIPPED | OUTPATIENT
Start: 2023-03-20 | End: 2023-03-23

## 2023-03-20 RX ORDER — CEPHALEXIN 500 MG/1
1000 CAPSULE ORAL EVERY 12 HOURS
Qty: 60 CAPSULE | Refills: 0 | Status: ON HOLD | OUTPATIENT
Start: 2023-03-24 | End: 2023-04-27 | Stop reason: SDUPTHER

## 2023-03-20 RX ORDER — DULOXETIN HYDROCHLORIDE 30 MG/1
30 CAPSULE, DELAYED RELEASE ORAL 2 TIMES DAILY
Qty: 60 CAPSULE | Refills: 0 | Status: ON HOLD | OUTPATIENT
Start: 2023-03-20 | End: 2023-07-18 | Stop reason: HOSPADM

## 2023-03-20 RX ADMIN — INSULIN ASPART 4 UNITS: 100 INJECTION, SOLUTION INTRAVENOUS; SUBCUTANEOUS at 08:03

## 2023-03-20 RX ADMIN — CIPROFLOXACIN 500 MG: 500 TABLET, FILM COATED ORAL at 08:03

## 2023-03-20 RX ADMIN — METHOCARBAMOL 500 MG: 500 TABLET ORAL at 08:03

## 2023-03-20 RX ADMIN — ASPIRIN 81 MG: 81 TABLET, COATED ORAL at 08:03

## 2023-03-20 RX ADMIN — INSULIN ASPART 2 UNITS: 100 INJECTION, SOLUTION INTRAVENOUS; SUBCUTANEOUS at 08:03

## 2023-03-20 RX ADMIN — APIXABAN 5 MG: 5 TABLET, FILM COATED ORAL at 08:03

## 2023-03-20 RX ADMIN — DULOXETINE 30 MG: 30 CAPSULE, DELAYED RELEASE ORAL at 08:03

## 2023-03-20 RX ADMIN — METHOCARBAMOL 500 MG: 500 TABLET ORAL at 12:03

## 2023-03-20 RX ADMIN — CLOPIDOGREL BISULFATE 75 MG: 75 TABLET ORAL at 08:03

## 2023-03-20 RX ADMIN — PANTOPRAZOLE SODIUM 40 MG: 40 INJECTION, POWDER, FOR SOLUTION INTRAVENOUS at 08:03

## 2023-03-20 RX ADMIN — INSULIN DETEMIR 12 UNITS: 100 INJECTION, SOLUTION SUBCUTANEOUS at 08:03

## 2023-03-20 RX ADMIN — INSULIN ASPART 4 UNITS: 100 INJECTION, SOLUTION INTRAVENOUS; SUBCUTANEOUS at 12:03

## 2023-03-20 RX ADMIN — FUROSEMIDE 40 MG: 40 TABLET ORAL at 08:03

## 2023-03-20 RX ADMIN — AMLODIPINE BESYLATE 10 MG: 10 TABLET ORAL at 08:03

## 2023-03-20 NOTE — DISCHARGE SUMMARY
[facility] Medicine  Discharge Summary      Patient Name: Nelsy Marino  MRN: [id number]  NICK: [id number]  Patient Class: IP- Inpatient  Admission Date: 2/27/2023  Hospital Length of Stay: 21 days  Discharge Date and Time:  03/20/2023 2:06 PM  Attending Physician: Jasbir Hayward MD   Discharging Provider: Jasbir Hayward MD  Primary Care Provider: [facility] Medicine Team: [facility] MED R Jasbir Hayward MD  Primary Care Team: [facility] MED R    HPI:   This is an 82-year-old female with a past medical history of severe PAD, hypertension, type 2 diabetes, history of DVT who presents with right foot pain.    Patient reports worsening right foot pain, swelling and discoloration that started after she had an ingrown toenail removed by her podiatrist.  She reports that her discoloration started since 2/7. Of note, the patient underwent an angiogram on 02/23 by vascular surgery. In the ED, she was tachycardic (112), and hypertensive.  Labs showed microcytic anemia (10.2), elevated sed rate (112), elevated CRP (56.5), elevated lactic acid (2.5).  Foot x-ray showed DJD and spurs on the calcaneus with no acute process.  Vascular surgery was consulted and recommended continuing antibiotics and evaluating in the morning.  She was given morphine 4 mg IV, vancomycin, Zosyn and was admitted for further management.      Procedure(s) (LRB):  AMPUTATION, FOOT, TRANSMETATARSAL (Right)      Hospital Course:    82-year-old female with a past medical history of severe PAD, hypertension, type 2 diabetes, history of DVT admitted on 02/27/2023 for gangrene of right toes. Of note, patient recent underwent LE angiogram by Dr. Tran on 02/23/23. Per OP report, was able to get successful revascularization / resolution of stenosis. XR right foot with no fracture, dislocation, bone destruction, or foreign body seen.  There is DJD and spurs on the calcaneus.  Was started on IV Zosyn and vancomycin on admit. Blood   cx with NGTD. On 02/28, patient with hypoglycemia but was asymptomatic. Improved with glucogen. Likely due to NPO status and was still receiving insulin.  Cardiology consulted for preoperative clearance.  Recommended stress test.  Normal myocardial perfusion scan.  Cardiology deem patient at acceptable cardiac risk for vasc surgery/anesthesia.  Vascular surgery consulted and patient underwent fem fem bypass on 3/2/2023. Podiatry also consulted and patient underwent right TMA on 3/4/23.     Stepped down to Hospital Medicine service. O2 requirement steadily improving while on diuresis. Continued lasix for dCHF. Requiring 2L supplemental oxygen at all times with 6 min walk test. Home Oxygen ordered. S/p 2 units pRBC transfusion for Hb<7. Gi consulted for melena. No plans for EGD as Hb stablized. Continue PPI. OP GI FU.  WBC continues to rise from 13 to 15. Per vascular surgery, continuing wound care for right TMA and no evidence of active infection at the surgical site. Per ID, switched Meropenem to ciprofloxacin 500 mg q12 hours to finish total 14 days of therapy for pseudomonas fluorescens SSTI. EOT 3/23/23. After this point, patient can resume her cephalexin 1000 mg BID Ppx regimen for chronic suppression of prior graft infection. Op fu with ID. Podiatry consulted.RLE WB to heel in surgical shoe for transfers or short distances. Elevate RLE at all times while in bed or chair.OP podiatry follow up.  Wound care instructions for discharge include RLE WB to heel in surgical shoe for transfers or short distances. HH/facility dressing changes R foot 3x/week: cleanse incision with saline, apply betadine soaked adaptic, apply gauze, wrap with cast padding and coban.     PT/OT rec SNF. However patient refused and prefers to go home with HH. Advised to F/u with PCP in 1 week. Patient is currently medically and HDS. He is being d/c home. Plan of care discussed with patient, verbalized understanding. All questions were  answered.                Goals of Care Treatment Preferences:  Code Status: Full Code      Consults:   Consults (From admission, onward)          Status Ordering Provider     Inpatient consult to Midline team  Once        Provider:  (Not yet assigned)    Completed BERTRAM CORLEY     Inpatient consult to Gastroenterology  Once        Provider:  (Not yet assigned)    Completed WAYNE JOHNSON     Inpatient consult to Endocrinology  Once        Provider:  (Not yet assigned)    Completed SEAN LOPEZ     Inpatient consult to Infectious Diseases  Once        Provider:  (Not yet assigned)    Completed ROMI LITTLEJOHN     Inpatient consult to Podiatry  Once        Provider:  (Not yet assigned)    Completed NITHYA DAVID     Inpatient consult to Social Work  Once        Provider:  (Not yet assigned)    Acknowledged TARA WEINSTEIN     Inpatient consult to Cardiology  Once        Provider:  (Not yet assigned)    Completed SAWYER LOVETT     Inpatient consult to Podiatry  Once        Provider:  Lou Kirkland DPM    Completed FIOR YANG     IP consult to case management  Once        Provider:  (Not yet assigned)    Completed JENNA CREWS     Inpatient consult to Vascular Surgery  Once        Provider:  Danny Dunbar MD    Completed JULY HOWELL            No new Assessment & Plan notes have been filed under this hospital service since the last note was generated.  Service: Hospital Medicine    Final Active Diagnoses:    Diagnosis Date Noted POA    PRINCIPAL PROBLEM:  Critical limb ischemia with history of revascularization of same extremity [I70.229, Z98.890] 02/27/2023 Not Applicable    Melena [K92.1] 03/17/2023 Yes    Bilious vomiting [R11.14] 03/16/2023 No    Upper GI bleed [K92.2] 03/16/2023 Yes    Diabetic foot infection [E11.628, L08.9] 03/16/2023 Yes    Acute encephalopathy [G93.40] 03/13/2023 No    Leukocytosis [D72.829] 03/11/2023 No    S/P transmetatarsal amputation of foot, right  "[Z89.431] 03/08/2023 Not Applicable    Vascular graft infection [T82.7XXA] 03/07/2023 No    Hyponatremia [E87.1] 03/03/2023 Yes    Hypoglycemia [E16.2] 02/28/2023 No    Sepsis [A41.9] 02/27/2023 Yes    PVD (peripheral vascular disease) [I73.9] 03/20/2019 Yes     Chronic    History of DVT (deep vein thrombosis) [Z86.718] 03/20/2019 Not Applicable     Chronic    Essential hypertension [I10] 03/20/2019 Yes     Chronic    Type 2 diabetes mellitus with chronic kidney disease, with long-term current use of insulin [E11.22, Z79.4] 03/20/2019 Not Applicable    Acute hypoxemic respiratory failure [J96.01] 03/20/2019 No      Problems Resolved During this Admission:    Diagnosis Date Noted Date Resolved POA    Wet gangrene [I96] 03/02/2023 03/08/2023 Yes    Advanced care planning/counseling discussion [Z71.89] 02/28/2023 03/02/2023 Not Applicable    Preoperative clearance [Z01.818] 02/28/2023 03/02/2023 Not Applicable       Discharged Condition: good    Disposition: Home or Self Care    Follow Up:   Follow-up Information       Demarco Wright MD Follow up in 1 week(s).    Specialties: Cardiology, Interventional Cardiology  Contact information:  [address]  [address]  [facility] [address]  [phone]                           Patient Instructions:      HOSPITAL BED FOR HOME USE     Order Specific Question Answer Comments   Type: Semi-electric    Length of need (1-99 months): 99    Does patient have medical equipment at home? walker, rolling    Does patient have medical equipment at home? bedside commode    Does patient have medical equipment at home? shower chair    Height: 5' 4" (1.626 m)    Weight: 67 kg (147 lb 11.3 oz)    Please check all that apply: Patient requires positioning of the body in ways not feasible in an ordinary bed due to a medical condition which is expected to last at least one month.    Please check all that apply: Patient requires, for the alleviation of pain, positioning of the body in ways not " "feasible in an ordinary bed.      COMMODE FOR HOME USE     Order Specific Question Answer Comments   Type: Standard    Height: 5' 4" (1.626 m)    Weight: 67 kg (147 lb 11.3 oz)    Does patient have medical equipment at home? walker, rolling    Does patient have medical equipment at home? bedside commode    Does patient have medical equipment at home? shower chair    Length of need (1-99 months): 99      WHEELCHAIR FOR HOME USE     Order Specific Question Answer Comments   Hours in W/C per day: 16    Type of Wheelchair: Standard    Size(Width): 18"(STD adult)    Leg Support: STD footrests    Lap Belt: Velcro    Accessories: Anti-tippers    Cushion: Basic    Reclining Back Yes    Headrest: Yes    Height: 5' 4" (1.626 m)    Weight: 67 kg (147 lb 11.3 oz)    Does patient have medical equipment at home? walker, rolling    Does patient have medical equipment at home? bedside commode    Does patient have medical equipment at home? shower chair    Length of need (1-99 months): 99    Please check all that apply: Caregiver is capable and willing to operate wheelchair safely.      BATH/SHOWER CHAIR FOR HOME USE     Order Specific Question Answer Comments   Height: 5' 4" (1.626 m)    Weight: 67 kg (147 lb 11.3 oz)    Does patient have medical equipment at home? walker, rolling    Does patient have medical equipment at home? bedside commode    Does patient have medical equipment at home? shower chair    Length of need (1-99 months): 99    Type: With back      OXYGEN FOR HOME USE     Order Specific Question Answer Comments   Liter Flow 2    Duration Continuous    Qualifying Test Performed at: Rest    Oxygen saturation: 84    Portable mode: pulse dose acceptable    Mode: Portable concentrator    Route nasal cannula    Device: home concentrator with portable concentrator    Length of need (in months): 99 mos    Patient condition with qualifying saturation CHF    Height: 5' 4" (1.626 m)    Weight: 67 kg (147 lb 11.3 oz)  "   Alternative treatment measures have been tried or considered and deemed clinically ineffective. Yes      Ambulatory referral/consult to Podiatry   Standing Status: Future   Referral Priority: Routine Referral Type: Consultation   Referral Reason: Specialty Services Required   Requested Specialty: Podiatry   Number of Visits Requested: 1     Ambulatory referral/consult to Vascular Surgery   Standing Status: Future   Referral Priority: Routine Referral Type: Consultation   Referral Reason: Specialty Services Required   Requested Specialty: Vascular Surgery   Number of Visits Requested: 1     Ambulatory referral/consult to Gastroenterology   Standing Status: Future   Referral Priority: Routine Referral Type: Consultation   Referral Reason: Specialty Services Required   Requested Specialty: Gastroenterology   Number of Visits Requested: 1     Ambulatory referral/consult to Infectious Disease   Standing Status: Future   Referral Priority: Routine Referral Type: Consultation   Referral Reason: Specialty Services Required   Requested Specialty: Infectious Diseases   Number of Visits Requested: 1     Diet Cardiac     Notify your health care provider if you experience any of the following:  temperature >100.4     Notify your health care provider if you experience any of the following:  persistent nausea and vomiting or diarrhea     Notify your health care provider if you experience any of the following:  severe uncontrolled pain     Notify your health care provider if you experience any of the following:  redness, tenderness, or signs of infection (pain, swelling, redness, odor or green/yellow discharge around incision site)     Notify your health care provider if you experience any of the following:  difficulty breathing or increased cough     Notify your health care provider if you experience any of the following:  severe persistent headache     Notify your health care provider if you experience any of the following:   "worsening rash     Notify your health care provider if you experience any of the following:  persistent dizziness, light-headedness, or visual disturbances     Notify your health care provider if you experience any of the following:  increased confusion or weakness     Activity as tolerated       Significant Diagnostic Studies: Labs:   BMP:   Recent Labs   Lab 03/19/23  0157 03/20/23  0517   * 168*    141   K 4.0 3.8    104   CO2 28 30*   BUN 32* 25*   CREATININE 0.9 0.8   CALCIUM 9.2 9.1   , CMP   Recent Labs   Lab 03/19/23  0157 03/20/23  0517    141   K 4.0 3.8    104   CO2 28 30*   * 168*   BUN 32* 25*   CREATININE 0.9 0.8   CALCIUM 9.2 9.1   PROT 7.0 7.1   ALBUMIN 2.0* 2.1*   BILITOT 0.6 0.7   ALKPHOS 69 62   AST 29 28   ALT 22 21   ANIONGAP 12 7*    and CBC   Recent Labs   Lab 03/19/23 0157 03/20/23 0517   WBC 13.84* 13.05*   HGB 7.3* 8.6*   HCT 25.0* 28.2*    366       Pending Diagnostic Studies:       None           Medications:  Reconciled Home Medications:      Medication List        START taking these medications      atorvastatin 40 MG tablet  Commonly known as: LIPITOR  Take 1 tablet (40 mg total) by mouth every evening.     BD SHERINE 2ND GEN PEN NEEDLE 32 gauge x 5/32" Ndle  Generic drug: pen needle, diabetic  Inject 1 each into the skin 4 (four) times daily.     benzonatate 100 MG capsule  Commonly known as: TESSALON  Take 1 capsule (100 mg total) by mouth 3 (three) times daily as needed for Cough.     cephALEXin 500 MG capsule  Commonly known as: KEFLEX  Take 2 capsules (1,000 mg total) by mouth every 12 (twelve) hours.  Start taking on: March 24, 2023     ciprofloxacin HCl 500 MG tablet  Commonly known as: CIPRO  Take 1 tablet (500 mg total) by mouth every 12 (twelve) hours. for 3 days     clopidogreL 75 mg tablet  Commonly known as: PLAVIX  Take 1 tablet (75 mg total) by mouth once daily.     DULoxetine 30 MG capsule  Commonly known as: CYMBALTA  Take 1 " "capsule (30 mg total) by mouth 2 (two) times daily.     ELIQUIS 5 mg Tab  Generic drug: apixaban  Take 1 tablet (5 mg total) by mouth 2 (two) times daily.     furosemide 20 MG tablet  Commonly known as: LASIX  Take 1 tablet (20 mg total) by mouth once daily.     methocarbamoL 500 MG Tab  Commonly known as: ROBAXIN  Take 1 tablet (500 mg total) by mouth 4 (four) times daily. for 7 days     pantoprazole 40 MG tablet  Commonly known as: PROTONIX  Take 1 tablet (40 mg total) by mouth 2 (two) times daily.            CHANGE how you take these medications      insulin aspart protamine-insulin aspart 100 unit/mL (70-30) Inpn pen  Commonly known as: NovoLOG Mix 70-30FlexPen U-100  Inject 12 Units into the skin 2 (two) times daily before meals.  What changed:   how much to take  when to take this  Another medication with the same name was removed. Continue taking this medication, and follow the directions you see here.            CONTINUE taking these medications      amLODIPine 10 MG tablet  Commonly known as: NORVASC  Take 10 mg by mouth once daily.     aspirin 81 MG EC tablet  Commonly known as: ECOTRIN  Take 81 mg by mouth once daily.     carvediloL 12.5 MG tablet  Commonly known as: COREG  Take 12.5 mg by mouth 2 (two) times daily.     insulin syringe-needle,dispos. 0.3 mL 30 gauge x 5/16" Syrg  by Misc.(Non-Drug; Combo Route) route.     losartan-hydrochlorothiazide 100-25 mg 100-25 mg per tablet  Commonly known as: HYZAAR  Take 1 tablet by mouth once daily.     metFORMIN 1000 MG tablet  Commonly known as: GLUCOPHAGE  Take 1,000 mg by mouth 2 (two) times daily with meals.     ondansetron 8 MG tablet  Commonly known as: ZOFRAN     TRUE METRIX GLUCOSE TEST STRIP MISC  by Misc.(Non-Drug; Combo Route) route.            STOP taking these medications      cilostazoL 50 MG Tab  Commonly known as: PLETAL     glipiZIDE 10 MG tablet  Commonly known as: GLUCOTROL              Indwelling Lines/Drains at time of discharge: "   Lines/Drains/Airways       None                   Time spent on the discharge of patient: 35 minutes         Jasbir Hayward MD  Department of Hospital Medicine  [facility]

## 2023-03-20 NOTE — NURSING
Home Oxygen Evaluation     Date Performed: 3/20/2023     1)         Patient's Home O2 Sat on room air, while at rest: 84%     2)         Patient's O2 Sat on room air while exercising: N/A                               If O2 sats on room air while exercising remain 89% or above patient does not qualify, no further testing needed Document N/A in step 3. If O2 sats on room air while exercising are 88% or below, continue to step 3.      3)         Patient's O2 Sat while exercising on O2:  at  LPM N/A  2L of oxygen required                             If O2 sats on room air at rest are 88% or below, patient qualifies. No additional testing needed. Document N/A in steps 2 and 3. If 89% or above, complete steps 2.

## 2023-03-20 NOTE — PHYSICIAN QUERY
PT Name: Nelsy Marino  MR #: [id number]     DOCUMENTATION CLARIFICATION     CDS/: Jayne Tai RN             Contact information: [email or web address]  This form is a permanent document in the medical record.     Query Date: March 20, 2023    By submitting this query, we are merely seeking further clarification of documentation.  Please utilize your independent clinical judgment when addressing the question(s) below.    The Medical Record contains the following   Indicators Supporting Clinical Findings Location in Medical Record   x Heart Failure documented  82 y.o. female with  has a past medical history of CHF (congestive heart failure), Diabetes mellitus, DVT (deep venous thrombosis),  Hypertension, and Miscarriage.    Signs/symptoms of respiratory failure include- tachypnea, increased work of breathing and lethargy. Contributing diagnoses includes - CHF, Obesity Hypoventilation and Pneumonia    Consult 2/28 Podiatry        PN 3/9 HM   x BNP  03/08 03/10 03/13 03/18   BNP 1,089 (H) 1,155 (H) 1,381 (H) 478 (H)    Lab         x EF/Echo The estimated ejection fraction is 65%.  The left ventricle is normal in size with normal systolic function.  Grade II left ventricular diastolic dysfunction.  Normal right ventricular size with normal right ventricular systolic function.  Mild left atrial enlargement.  Mild-to-moderate mitral regurgitation.  There is pulmonary hypertension.  The estimated PA systolic pressure is 55 mmHg.  Intermediate central venous pressure (8 mmHg).    Echo 3/9   x Radiology findings Impression:     Mild cardiomegaly with bilateral edema.   CXR  3/8   x Subjective/Objective Respiratory Conditions Acute hypoxemic respiratory failure  Patient with Hypercapnic and Hypoxic Respiratory failure which is Acute on chronic.  she is not on home oxygen. Supplemental oxygen was provided and noted-  .  Signs/symptoms of respiratory failure include- tachypnea, increased work of breathing and  lethargy     Patient reports SOB improving while on diuresis. PN 3/9  HM              Pn 3/9  HM      Recent/Current MI      Heart Transplant, LVAD     x Edema, JVD    Right lower leg: Edema present.     Left lower leg: No edema.   H&P 2/27 HM    Ascites     x Diuretics/Meds Furosemide 20 mg IV    Furosemide 40 mg IV   Furosemide 80 mg IV   Furosemide 40 mg oral 2 times daily   MAR 3/8  MAR 3/9, 3/10  MAR 3/8, 3/9  MAR 3/12- 3/20     x Other Treatment Febrile to 101 last night. Increased shortness of breath with pulmonary edema requiring oxygen. PN 3/9  Vascular Surgery    Other       Heart failure is a clinical diagnosis which includes symptomatic fluid retention, elevated intracardiac pressures, and/or the inability of the heart to deliver adequate blood flow.    Heart Failure with reduced Ejection Fraction (HFrEF) or Systolic Heart Failure (loses ability to contract normally, EF is <40%)    Heart Failure with preserved Ejection Fraction (HFpEF) or Diastolic Heart Failure (stiff ventricles, does not relax properly, EF is >50%)     Heart Failure with Combined Systolic and Diastolic Failure (stiff ventricles, does not relax properly and EF is <50%)    Mid-range or mildly reduced ejection fraction (HFmrEF) is classified as systolic heart failure.  Congestive heart failure with a recovered EF is classified as Diastolic Heart Failure.  Common clues to acute exacerbation:  Rapidly progressive symptoms (w/in 2 weeks of presentation), using IV diuretics, using supplemental O2, pulmonary edema on Xray, new or worsening pleural effusion, +JVD or other signs of volume overload, MI w/in 4 weeks, and/or BNP >500  The clinical guidelines noted are only system guidelines, and do not replace the providers clinical judgment.    Provider, please specify the Heart Failure  diagnosis associated with the above clinical findings.    [   x]  Acute on Chronic Diastolic Heart Failure (HFpEF) - worsening of CHF signs/symptoms in  preexisting CHF   [   ]  Other (please specify): ___________________________________       Please document in your progress notes daily for the duration of treatment until resolved and include in your discharge summary.    References:  American Heart Association editorial staff. (2017, May). Ejection Fraction Heart Failure Measurement. American Heart Association. https://www.heart.org/en/health-topics/heart-failure/diagnosing-heart-failure/ejection-fraction-heart-failure-measurement#:~:text=Ejection%20fraction%20(EF)%20is%20a,pushed%20out%20with%20each%20heartbeat  EDISON Gonsalez (2020, December 15). Heart failure with preserved ejection fraction: Clinical manifestations and diagnosis. [employer]. https://www.[employer]/contents/heart-failure-with-preserved-ejection-fraction-clinical-manifestations-and-diagnosis.  ICD-10-CM/PCS Coding Clinic Third Quarter ICD-10, Effective with discharges: September 8, 2020 [facility] § Heart failure with mid-range or mildly reduced ejection fraction (2020).  ICD-10-CM/PCS Coding Clinic Third Quarter ICD-10, Effective with discharges: September 8, 2020 [facility] § Heart failure with recovered ejection fraction (2020).  Form No. [id number]

## 2023-03-20 NOTE — PLAN OF CARE
[facility] - [facility]      HOME HEALTH ORDERS  FACE TO FACE ENCOUNTER    Patient Name: Nelsy Marino  YOB: 1940    PCP: Juan F Garay   PCP Address: [facility] / [place]  PCP Phone Number: [phone]  PCP Fax: [phone]    Encounter Date: 2/27/23    Admit to Home Health    Diagnoses:  Active Hospital Problems    Diagnosis  POA    *Critical limb ischemia with history of revascularization of same extremity [I70.229, Z98.890]  Not Applicable    Melena [K92.1]  Yes    Bilious vomiting [R11.14]  No    Upper GI bleed [K92.2]  Yes    Diabetic foot infection [E11.628, L08.9]  Yes    Acute encephalopathy [G93.40]  No    Leukocytosis [D72.829]  No    S/P transmetatarsal amputation of foot, right [Z89.431]  Not Applicable    Vascular graft infection [T82.7XXA]  No    Hyponatremia [E87.1]  Yes    Hypoglycemia [E16.2]  No    Sepsis [A41.9]  Yes    PVD (peripheral vascular disease) [I73.9]  Yes     Chronic    History of DVT (deep vein thrombosis) [Z86.718]  Not Applicable     Chronic    Essential hypertension [I10]  Yes     Chronic    Type 2 diabetes mellitus with chronic kidney disease, with long-term current use of insulin [E11.22, Z79.4]  Not Applicable    Acute hypoxemic respiratory failure [J96.01]  No      Resolved Hospital Problems    Diagnosis Date Resolved POA    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    Current Facility-Administered Medications   Medication Dose Route Frequency Provider Last Rate  Last Admin    0.9%  NaCl infusion (for blood administration)   Intravenous Q24H PRN Buster Walker MD        0.9%  NaCl infusion (for blood administration)   Intravenous Q24H PRN Tanner Coello MD        0.9%  NaCl infusion (for blood administration)   Intravenous Q24H PRN Jasbir Hayward MD        acetaminophen tablet 650 mg  650 mg Oral Q6H PRN Tanner Coello MD   650 mg at 03/15/23 0045    amLODIPine tablet 10 mg  10 mg Oral Daily Ramana Lopez MD   10 mg at 03/20/23 0835    apixaban tablet 5 mg  5 mg Oral BID Tanner Coello MD   5 mg at 03/20/23 0836    aspirin EC tablet 81 mg  81 mg Oral Daily Tanner Coello MD   81 mg at 03/20/23 0834    atorvastatin tablet 40 mg  40 mg Oral QHS Demarco Wright MD   40 mg at 03/19/23 2022    benzonatate capsule 100 mg  100 mg Oral TID PRN Tanner Coello MD        [START ON 3/24/2023] cephALEXin capsule 1,000 mg  1,000 mg Oral Q12H Tanner Coello MD        ciprofloxacin HCl tablet 500 mg  500 mg Oral Q12H Tanner Coello MD   500 mg at 03/20/23 0835    clopidogreL tablet 75 mg  75 mg Oral Daily Tanner Coello MD   75 mg at 03/20/23 0836    dextrose 10% bolus 125 mL 125 mL  12.5 g Intravenous PRN Kashmir Madrigal, DO        dextrose 10% bolus 250 mL 250 mL  25 g Intravenous PRN Kashmir Madrigal,         dextrose 40 % gel 15,000 mg  15 g Oral PRN Tanner Coello MD        dextrose 40 % gel 30,000 mg  30 g Oral PRN Kashmir Madrigal, DO        DULoxetine DR capsule 30 mg  30 mg Oral BID Tanner Coello MD   30 mg at 03/20/23 0835    furosemide tablet 40 mg  40 mg Oral BID Tanner Coello MD   40 mg at 03/20/23 0836    glucagon (human recombinant) injection 1 mg  1 mg Intramuscular PRN Kashmir Madrigal DO        HYDROmorphone injection 0.2 mg  0.2 mg Intravenous Q4H PRN GREGORY Coronel MD   0.2 mg at 03/15/23 0225    insulin aspart U-100 pen 0-5 Units  0-5 Units Subcutaneous QID (AC & HS) Kashmir Madrigal DO   2 Units at 03/20/23 0832    insulin  aspart U-100 pen 4 Units  4 Units Subcutaneous TIDWM Aditya Farrell MD   4 Units at 03/20/23 1213    insulin detemir U-100 pen 12 Units  12 Units Subcutaneous Daily Aditya Farrell MD   12 Units at 03/20/23 0836    levalbuterol nebulizer solution 1.25 mg  1.25 mg Nebulization Q8H PRN Tanner Coello MD   1.25 mg at 03/16/23 1631    melatonin tablet 6 mg  6 mg Oral Nightly Tanner Coello MD   6 mg at 03/19/23 2023    methocarbamoL tablet 500 mg  500 mg Oral QID Tanner Coello MD   500 mg at 03/20/23 1213    naloxone 0.4 mg/mL injection 0.02 mg  0.02 mg Intravenous PRN Ramana Lopez MD   0.02 mg at 03/08/23 1852    ondansetron injection 4 mg  4 mg Intravenous Q8H PRN Ramana Lopez MD   4 mg at 03/16/23 0037    pantoprazole injection 40 mg  40 mg Intravenous BID loop Tanner Coello MD   40 mg at 03/20/23 0838    prochlorperazine injection Soln 5 mg  5 mg Intravenous Q6H PRN Ramana Lopez MD        simethicone chewable tablet 80 mg  1 tablet Oral QID PRN Ramana Lopez MD        sodium chloride 0.9% flush 10 mL  10 mL Intravenous Q12H PRN Ramana Lopez MD        traMADoL tablet 50 mg  50 mg Oral Q6H PRN Tanner Coello MD   50 mg at 03/19/23 2023     Current Discharge Medication List        START taking these medications    Details   apixaban (ELIQUIS) 5 mg Tab Take 1 tablet (5 mg total) by mouth 2 (two) times daily.  Qty: 60 tablet, Refills: 0      atorvastatin (LIPITOR) 40 MG tablet Take 1 tablet (40 mg total) by mouth every evening.  Qty: 30 tablet, Refills: 0      benzonatate (TESSALON) 100 MG capsule Take 1 capsule (100 mg total) by mouth 3 (three) times daily as needed for Cough.  Qty: 20 capsule, Refills: 0      cephALEXin (KEFLEX) 500 MG capsule Take 2 capsules (1,000 mg total) by mouth every 12 (twelve) hours.  Qty: 60 capsule, Refills: 0      ciprofloxacin HCl (CIPRO) 500 MG tablet Take 1 tablet (500 mg total) by mouth every 12 (twelve) hours. for 3 days  Qty: 6 tablet, Refills: 0      clopidogreL  "(PLAVIX) 75 mg tablet Take 1 tablet (75 mg total) by mouth once daily.  Qty: 30 tablet, Refills: 0      DULoxetine (CYMBALTA) 30 MG capsule Take 1 capsule (30 mg total) by mouth 2 (two) times daily.  Qty: 60 capsule, Refills: 0      furosemide (LASIX) 20 MG tablet Take 1 tablet (20 mg total) by mouth once daily.  Qty: 30 tablet, Refills: 0      methocarbamoL (ROBAXIN) 500 MG Tab Take 1 tablet (500 mg total) by mouth 4 (four) times daily. for 7 days  Qty: 28 tablet, Refills: 0      pantoprazole (PROTONIX) 40 MG tablet Take 1 tablet (40 mg total) by mouth 2 (two) times daily.  Qty: 60 tablet, Refills: 1           CONTINUE these medications which have CHANGED    Details   insulin aspart protamine-insulin aspart (NOVOLOG MIX 70-30FLEXPEN U-100) 100 unit/mL (70-30) InPn pen Inject 12 Units into the skin 2 (two) times daily before meals.  Refills: 0           CONTINUE these medications which have NOT CHANGED    Details   carvediloL (COREG) 12.5 MG tablet Take 12.5 mg by mouth 2 (two) times daily.      amlodipine (NORVASC) 10 MG tablet Take 10 mg by mouth once daily.      aspirin (ECOTRIN) 81 MG EC tablet Take 81 mg by mouth once daily.      BLOOD SUGAR DIAGNOSTIC (TRUE METRIX GLUCOSE TEST STRIP MISC) by Misc.(Non-Drug; Combo Route) route.      insulin syringe-needle,dispos. 0.3 mL 30 gauge x 5/16" Syrg by Misc.(Non-Drug; Combo Route) route.      losartan-hydrochlorothiazide 100-25 mg (HYZAAR) 100-25 mg per tablet Take 1 tablet by mouth once daily.      metformin (GLUCOPHAGE) 1000 MG tablet Take 1,000 mg by mouth 2 (two) times daily with meals.      ondansetron (ZOFRAN) 8 MG tablet Refills: 0      pen needle, diabetic 32 gauge x 5/32" Ndle Inject 1 each into the skin 4 (four) times daily.  Qty: 100 each, Refills: 0           STOP taking these medications       cilostazol (PLETAL) 50 MG Tab Comments:   Reason for Stopping:         glipiZIDE (GLUCOTROL) 10 MG tablet Comments:   Reason for Stopping:                 I have " seen and examined this patient within the last 30 days. My clinical findings that support the need for the home health skilled services and home bound status are the following:no   Weakness/numbness causing balance and gait disturbance due to Infection making it taxing to leave home.     Diet:   cardiac diet    Labs:  Report Lab results to PCP.    Referrals/ Consults  Physical Therapy to evaluate and treat. Evaluate for home safety and equipment needs; Establish/upgrade home exercise program. Perform / instruct on therapeutic exercises, gait training, transfer training, and Range of Motion.  Occupational Therapy to evaluate and treat. Evaluate home environment for safety and equipment needs. Perform/Instruct on transfers, ADL training, ROM, and therapeutic exercises.    Activities:   activity as tolerated    Nursing:   Agency to admit patient within 24 hours of hospital discharge unless specified on physician order or at patient request    SN to complete comprehensive assessment including routine vital signs. Instruct on disease process and s/s of complications to report to MD. Review/verify medication list sent home with the patient at time of discharge  and instruct patient/caregiver as needed. Frequency may be adjusted depending on start of care date.     Skilled nurse to perform up to 3 visits PRN for symptoms related to diagnosis    Notify MD if SBP > 160 or < 90; DBP > 90 or < 50; HR > 120 or < 50; Temp > 101; O2 < 88%; Other:       Ok to schedule additional visits based on staff availability and patient request on consecutive days within the home health episode.    When multiple disciplines ordered:    Start of Care occurs on Sunday - Wednesday schedule remaining discipline evaluations as ordered on separate consecutive days following the start of care.    Thursday SOC -schedule subsequent evaluations Friday and Monday the following week.     Friday - Saturday SOC - schedule subsequent discipline evaluations  on consecutive days starting Monday of the following week.    For all post-discharge communication and subsequent orders please contact patient's primary care physician. If unable to reach primary care physician or do not receive response within 30 minutes, please contact RN for clinical staff order clarification      Home Health Aide:   Physical Therapy Three times weekly, and Occupational Therapy Three times weekly    Wound Care Orders  yes:  Foot Ulcer:  Location: R foot   RLE WB to heel in surgical shoe for transfers or short distances. HH/facility dressing changes R foot 3x/week: cleanse incision with saline, apply betadine soaked adaptic, apply gauze, wrap with cast padding and coban.       I certify that this patient is confined to her home and needs intermittent skilled nursing care, physical therapy, and occupational therapy.

## 2023-03-20 NOTE — PLAN OF CARE
Problem: Adult Inpatient Plan of Care  Goal: Absence of Hospital-Acquired Illness or Injury  Outcome: Ongoing, Progressing  Goal: Optimal Comfort and Wellbeing  Outcome: Ongoing, Progressing     Problem: Diabetes Comorbidity  Goal: Blood Glucose Level Within Targeted Range  Outcome: Ongoing, Progressing     Problem: Adjustment to Illness (Sepsis/Septic Shock)  Goal: Optimal Coping  Outcome: Ongoing, Progressing     Problem: Bleeding (Sepsis/Septic Shock)  Goal: Absence of Bleeding  Outcome: Ongoing, Progressing     Problem: Nutrition Impaired (Sepsis/Septic Shock)  Goal: Optimal Nutrition Intake  Outcome: Ongoing, Progressing

## 2023-03-20 NOTE — NURSING
The patient's after visit summary was read and reviewed with the patient and her sister bedside. All of their questions were answered. The portable oxygen was delivered bedside and 2L nasal cannula was placed on the patient prior to discharge. Transport took the patient and all of her belongings down the elevator. The patient was awake, alert and no distress noted.

## 2023-03-20 NOTE — ASSESSMENT & PLAN NOTE
- no episodes in the >48h with insulin reduction  - Lowering insulin dose recommended at d/c as well as discontinuing glipizide

## 2023-03-20 NOTE — ASSESSMENT & PLAN NOTE
- Blood sugars mostly around goal in the last 24 hours   While inpatient continue current MDI regimen below   - Levemir 12 units daily   - Aspart 4 units before meals  - Low dose correction scale insulin as needed  - BG checks AC/HS and at 2 am  - Hypoglycemia protocol in place  - If blood glucose greater than 300, please ask patient not to eat food or drink anything other than water until correctional insulin has brought it back below 250  - Please notify Endocrine for any change and/or advance in diet    Discharge recommendations:  - Her previous home regimen was Novolog 70/30 50u BID and Glipizide 10mg qd  - At d/c can continue Novolog 70/30 but please reduce the dose to 12 units twice daily with meals  - Please discontinue Glipizide  - She should continue accuchecks at home ac/hs  - Ensure pt has close f/u with her outpatient endocrinologist

## 2023-03-20 NOTE — SUBJECTIVE & OBJECTIVE
"Interval HPI:   Overnight events: No acute events o/n. Primary team planning to d/c pt today. 150s - low 200s in last 24h  Eatin%  Nausea: No  Hypoglycemia and intervention: No  Fever: No  TPN and/or TF: No  If yes, type of TF/TPN and rate: n/a      /61   Pulse 89   Temp 98 °F (36.7 °C)   Resp 18   Ht 5' 4" (1.626 m)   Wt 67 kg (147 lb 11.3 oz)   LMP  (LMP Unknown)   SpO2 (!) 93%   BMI 25.35 kg/m²     Labs Reviewed and Include    Recent Labs   Lab 23  0517   *   CALCIUM 9.1   ALBUMIN 2.1*   PROT 7.1      K 3.8   CO2 30*      BUN 25*   CREATININE 0.8   ALKPHOS 62   ALT 21   AST 28   BILITOT 0.7     Lab Results   Component Value Date    WBC 13.05 (H) 2023    HGB 8.6 (L) 2023    HCT 28.2 (L) 2023    MCV 86 2023     2023     No results for input(s): TSH, FREET4 in the last 168 hours.  Lab Results   Component Value Date    HGBA1C 8.2 (H) 2023       Nutritional status:   Body mass index is 25.35 kg/m².  Lab Results   Component Value Date    ALBUMIN 2.1 (L) 2023    ALBUMIN 2.0 (L) 2023    ALBUMIN 1.9 (L) 2023     No results found for: PREALBUMIN    Estimated Creatinine Clearance: 51 mL/min (based on SCr of 0.8 mg/dL).    Accu-Checks  Recent Labs     23  2327 23  0357 23  0746 23  1143 23  1658 23  2152 23  0039 23  0508 23  0830 23  1216   POCTGLUCOSE 204* 207* 199* 199* 189* 164* 150* 183* 201* 155*       Current Medications and/or Treatments Impacting Glycemic Control  Immunotherapy:    Immunosuppressants       None          Steroids:   Hormones (From admission, onward)      Start     Stop Route Frequency Ordered    23  melatonin tablet 6 mg         -- Oral Nightly 23 2326          Pressors:    Autonomic Drugs (From admission, onward)      None          Hyperglycemia/Diabetes Medications:   Antihyperglycemics (From admission, onward)      " Start     Stop Route Frequency Ordered    03/13/23 1200  insulin detemir U-100 pen 12 Units         -- SubQ Daily 03/13/23 1057    03/13/23 1130  insulin aspart U-100 pen 4 Units         -- SubQ 3 times daily with meals 03/13/23 1058    03/12/23 0645  insulin aspart U-100 pen 0-5 Units         -- SubQ Before meals & nightly 03/11/23 6011

## 2023-03-20 NOTE — PLAN OF CARE
[facility]  Discharge Final Note    Primary Care Provider: Juan F Garay    Expected Discharge Date: 3/20/2023    Final Discharge Note (most recent)       Final Note - 03/20/23 1058          Final Note    Assessment Type Final Discharge Note     Anticipated Discharge Disposition Home or Self Care     Hospital Resources/Appts/Education Provided Provided patient/caregiver with written discharge plan information        Post-Acute Status    Post-Acute Authorization Home Health     Home Health Status Set-up Complete/Auth obtained     Coverage [employer]     Patient choice form signed by patient/caregiver List with quality metrics by geographic area provided     Discharge Delays None known at this time                     Important Message from Medicare  Important Message from Medicare regarding Discharge Appeal Rights: Given to patient/caregiver, Explained to patient/caregiver, Signed/date by patient/caregiver     Date IMM was signed: 03/20/23  Time IMM was signed: 0947    Contact Info       Demarco Wright MD   Specialty: Cardiology, Interventional Cardiology    [address]  [address]  [place] [id number]   Phone: [phone]       Next Steps: Follow up in 1 week(s)          Pt to d.c home with family. [facility] to [facility] services on 3/21/23.    Edwige Gan LCSW  Case Management/[facility]  [phone]

## 2023-03-20 NOTE — PROGRESS NOTES
[facility] - [facility]  Endocrinology  Progress Note    Admit Date: 2/27/2023     Reason for Consult: Management of T2DM, Hyperglycemia     Surgical Procedure and Date: s/p  Left external iliac to right SFA bypass with 8mm PTFE; Left common and external iliac artery angioplasty and stenting, 8mm Lifestent; Aortogram, pelvic angiogram; Right lower extremity angiogram; and Right SFA angioplasty with plain and drug coated 4mm balloon on 3/8/2023    Diabetes diagnosis year: approx 26 years ago    Home Diabetes Medications:  Glipizide 10 mg daily, Novolog-NPH 70/30 50 units BID (prior Metformin but no longer) - uncertain why both sulfonylurea and insulin    How often checking glucose at home? 1-3 x day   BG readings on regimen: 100-200  Hypoglycemia on the regimen?  Yes at times  Missed doses on regimen?  No    Diabetes Complications include:     Hyperglycemia and Diabetic dermatitis    Complicating diabetes co morbidities:   HTN; DVT; CHF    HPI: This is an 82-year-old female with a past medical history of severe PAD, hypertension, and insulin dependant type 2 diabetes, history of DVT who presents with right foot pain. Patient reports worsening right foot pain, swelling and discoloration that started after she had an ingrown toenail removed by her podiatrist.  She reports that her discoloration started since 2/7. Of note, the patient underwent an angiogram on 02/23 by vascular surgery. In the ED, she was tachycardic (112), and hypertensive.  Labs showed microcytic anemia (10.2), elevated sed rate (112), elevated CRP (56.5), elevated lactic acid (2.5).  Foot x-ray showed DJD and spurs on the calcaneus with no acute process. Vascular surgery recommended  transfer to [facility] for hybrid OR surgery 3/2/23. Now s/p  Left external iliac to right SFA bypass with 8mm PTFE; Left common and external iliac artery angioplasty and stenting, 8mm Lifestent; Aortogram, pelvic angiogram; Right lower extremity angiogram; and Right SFA angioplasty  "with plain and drug coated 4mm balloon on 3/8/2023. Endocrine consulted to manage hyperglycemia and type 2 diabetes. Primary requesting BG not exceed 180 mg/dL.      Lab Results   Component Value Date    HGBA1C 8.2 (H) 2023     On evaluation the patient confirms that she has been a diabetic for 26 years.  She is on both Glipizide and 70/30 insulin.  Managed by her PCP.  She denies any issues with hypoglycemia or symptoms concerning for this.  Family history of diabetes includes multiple sisters and brothers.        Interval HPI:   Overnight events: No acute events o/n. Primary team planning to d/c pt today. 150s - low 200s in last 24h  Eatin%  Nausea: No  Hypoglycemia and intervention: No  Fever: No  TPN and/or TF: No  If yes, type of TF/TPN and rate: n/a      /61   Pulse 89   Temp 98 °F (36.7 °C)   Resp 18   Ht 5' 4" (1.626 m)   Wt 67 kg (147 lb 11.3 oz)   LMP  (LMP Unknown)   SpO2 (!) 93%   BMI 25.35 kg/m²     Labs Reviewed and Include    Recent Labs   Lab 23  0517   *   CALCIUM 9.1   ALBUMIN 2.1*   PROT 7.1      K 3.8   CO2 30*      BUN 25*   CREATININE 0.8   ALKPHOS 62   ALT 21   AST 28   BILITOT 0.7     Lab Results   Component Value Date    WBC 13.05 (H) 2023    HGB 8.6 (L) 2023    HCT 28.2 (L) 2023    MCV 86 2023     2023     No results for input(s): TSH, FREET4 in the last 168 hours.  Lab Results   Component Value Date    HGBA1C 8.2 (H) 2023       Nutritional status:   Body mass index is 25.35 kg/m².  Lab Results   Component Value Date    ALBUMIN 2.1 (L) 2023    ALBUMIN 2.0 (L) 2023    ALBUMIN 1.9 (L) 2023     No results found for: PREALBUMIN    Estimated Creatinine Clearance: 51 mL/min (based on SCr of 0.8 mg/dL).    Accu-Checks  Recent Labs     23/19/23  0357 23  0746 23  1143 23  1658 23  2152 23  0039 23  0508 23  0830 23  1216 "   POCTGLUCOSE 204* 207* 199* 199* 189* 164* 150* 183* 201* 155*       Current Medications and/or Treatments Impacting Glycemic Control  Immunotherapy:    Immunosuppressants       None          Steroids:   Hormones (From admission, onward)      Start     Stop Route Frequency Ordered    03/09/23 2100  melatonin tablet 6 mg         -- Oral Nightly 03/09/23 1546          Pressors:    Autonomic Drugs (From admission, onward)      None          Hyperglycemia/Diabetes Medications:   Antihyperglycemics (From admission, onward)      Start     Stop Route Frequency Ordered    03/13/23 1200  insulin detemir U-100 pen 12 Units         -- SubQ Daily 03/13/23 1057    03/13/23 1130  insulin aspart U-100 pen 4 Units         -- SubQ 3 times daily with meals 03/13/23 1058    03/12/23 0645  insulin aspart U-100 pen 0-5 Units         -- SubQ Before meals & nightly 03/11/23 4965            ASSESSMENT and PLAN    Cardiac/Vascular  * Critical limb ischemia with history of revascularization of same extremity  - Optimize blood glucose control to improve wound healing    Essential hypertension  Uncontrolled HTN can worsen insulin resistance.   Patient on an ARB outpatient per ADA guidelines.         Endocrine  Type 2 diabetes mellitus with chronic kidney disease, with long-term current use of insulin  - Blood sugars mostly around goal in the last 24 hours   While inpatient continue current MDI regimen below   - Levemir 12 units daily   - Aspart 4 units before meals  - Low dose correction scale insulin as needed  - BG checks AC/HS and at 2 am  - Hypoglycemia protocol in place  - If blood glucose greater than 300, please ask patient not to eat food or drink anything other than water until correctional insulin has brought it back below 250  - Please notify Endocrine for any change and/or advance in diet    Discharge recommendations:  - Her previous home regimen was Novolog 70/30 50u BID and Glipizide 10mg qd  - At d/c can continue Novolog 70/30  but please reduce the dose to 12 units twice daily with meals  - Please discontinue Glipizide  - She should continue accuchecks at home ac/hs  - Ensure pt has close f/u with her outpatient endocrinologist       Hypoglycemia-resolved as of 3/20/2023  - no episodes in the >48h with insulin reduction  - Lowering insulin dose recommended at d/c as well as discontinuing glipizide     GI  Upper GI bleed  - management by gastroenterology team  - no plans of endoscopy currently        Jossy Durmmond MD  Endocrinology  [facility]

## 2023-03-20 NOTE — ASSESSMENT & PLAN NOTE
Uncontrolled HTN can worsen insulin resistance.   Patient on an ARB outpatient per ADA guidelines.

## 2023-03-20 NOTE — PT/OT/SLP PROGRESS
Physical Therapy Treatment    Patient Name:  Nelsy Marino   MRN:  [id number]  Admitting Diagnosis:  Critical limb ischemia with history of revascularization of same extremity   Recent Surgery: Procedure(s) (LRB):  AMPUTATION, FOOT, TRANSMETATARSAL (Right) 16 Days Post-Op  Admit Date: 2/27/2023  Length of Stay: 21 days    Recommendations:     Discharge Recommendations:  nursing facility, skilled   Discharge Equipment Recommendations: none   Barriers to discharge: Inaccessible home environment    Assessment:     Nelsy Marino is a 82 y.o. female admitted with a medical diagnosis of Critical limb ischemia with history of revascularization of same extremity.  She presents with the following impairments/functional limitations:  weakness, impaired functional mobility, impaired endurance, gait instability, decreased safety awareness, impaired balance, impaired self care skills, decreased lower extremity function.     Pt agreeable to therapy before discharge, has not attempted stairs yet and will have to climb stairs to enter her home. Pt sits EOB from supine with SBA, stands from EOB and bedside chair with mod A, ambulates with min A and RW, ascends/descends stairs with RHR and HHA w/ Max A. If patient remains in hospital continue to gait endurance training and stair training to facilitate safe discharge home.     Rehab Prognosis: Fair; patient would benefit from acute skilled PT services to address these deficits and reach maximum level of function.    Recent Surgery: Procedure(s) (LRB):  AMPUTATION, FOOT, TRANSMETATARSAL (Right) 16 Days Post-Op    Treatment Tolerated: Fair    Highest level of mobility achieved this visit: ambulates 10ft w/ RW and min A and ascends/descends 3 steps with RHR and max A    Activity with RN/PCT: transfers with 1 person assist    Plan:     During this hospitalization, patient to be seen 3 x/week to address the identified rehab impairments via gait training, therapeutic activities, therapeutic  "exercises, neuromuscular re-education and progress toward the following goals:    Plan of Care Expires:  04/04/23    Subjective     RN Yola notified prior to session. Single family member present upon PT entrance into room.    Chief Complaint: eager to get home  Patient/Family Comments/goals: "I've got plenty help at home I will be alright  Pain/Comfort:  Pain Rating 1: 0/10      Objective:     Additional staff present: none    Patient found supine with: oxygen, telemetry, pulse ox (continuous)   Cognition:   Alert and Cooperative  Command following: Follows one-step verbal commands  Fluency: clear/fluent  General Precautions: Standard, Cardiac fall   Orthopedic Precautions:RLE partial weight bearing (heel only in post-op shoe)   Braces:  (Darco Shoe)   Body mass index is 25.35 kg/m².  Oxygen Device: Nasal Cannula 2.5L      Vitals: /61   Pulse 89   Temp 98 °F (36.7 °C)   Resp 18   Ht 5' 4" (1.626 m)   Wt 67 kg (147 lb 11.3 oz)   LMP  (LMP Unknown)   SpO2 (!) 93%   BMI 25.35 kg/m²     Outcome Measures:  AM-PAC 6 CLICK MOBILITY  Turning over in bed (including adjusting bedclothes, sheets and blankets)?: 3  Sitting down on and standing up from a chair with arms (e.g., wheelchair, bedside commode, etc.): 2  Moving from lying on back to sitting on the side of the bed?: 3  Moving to and from a bed to a chair (including a wheelchair)?: 3  Need to walk in hospital room?: 3  Climbing 3-5 steps with a railing?: 2  Basic Mobility Total Score: 16     Functional Mobility:    Bed Mobility:   Supine to Sit: stand by assistance; from L side of bed  Scooting anteriorly to EOB to have both feet planted on floor: stand by assistance  Sit to Supine: stand by assistance; to R side of bed    Sitting Balance at Edge of Bed:  Assistance Level Required: Stand-by Assistance  Time: 5 min  Postural deviations noted: slouched posture    Transfers:   Sit <> Stand Transfer: moderate assistance with rolling walker   Stand <> Sit " Transfer: moderate assistance with rolling walker   [id number] from EOB and [id number] from bedside chair  First attempt from EOB unsuccessful  Chair <> Bed Transfer: Stand Pivot technique with minimum assistance with rolling walker  Bed on patient's R    Standing Balance:  Assistance Level Required: Contact Guard Assistance  Patient used: rolling walker   Time: 5 min x 2  Postural deviations noted: slouched posture  Encouraged: upright stance      Gait:  Patient ambulated: 15ft + 5ft   Patient required: minimal assist  Patient used:  rolling walker   Gait Pattern observed: swing-to gait  Gait Deviation(s): occasional unsteady gait, decreased step length, decreased weight shift, and decreased sabine  Impairments due to: impaired balance, decreased strength, and decreased endurance  Portable Supplemental O2 2L utilized  all lines remained intact throughout ambulation trial  Chair follow for patient safety  Gait belt utilized  Comments: very slow sabine and low endurance    Stairs:  Pt ascended/descended 3 stair(s) with HHA with right handrail with Maximum Assistance.     Education:  Time provided for education, counseling and discussion of health disposition in regards to patient's current status  All questions answered within PT scope of practice and to patient's satisfaction  PT role in POC to address current functional deficits  Pt educated on proper body mechanics, safety techniques, and energy conservation with PT facilitation and cueing throughout session    Patient left supine with all lines intact, call button in reach, and family present.    GOALS:   Multidisciplinary Problems       Physical Therapy Goals          Problem: Physical Therapy    Goal Priority Disciplines Outcome Goal Variances Interventions   Physical Therapy Goal     PT, PT/OT Ongoing, Progressing     Description: Goals to be met by: 3/20     Patient will increase functional independence with mobility by performin. Supine to sit with  Stand-by Assistance  2. Rolling to Left and Right with Stand-by Assistance.  3. Sit to stand transfer with Contact Guard Assistance using Rolling Walker  4. Gait  x 50 feet with Contact Guard Assistance using Rolling Walker.   5. Ascend/descend 1 stair with bilateral Handrails Contact Guard Assistance using Rolling Walker.                        Time Tracking:     PT Received On: 03/20/23  PT Start Time: 1511     PT Stop Time: 1549  PT Total Time (min): 38 min     Billable Minutes:   Gait Training 25 min and Therapeutic Activity 13 min    Treatment Type: Treatment  PT/PTA: PT       Roger Arvizu, PT, DPT  3/20/2023

## 2023-03-21 ENCOUNTER — PATIENT OUTREACH (OUTPATIENT)
Dept: ADMINISTRATIVE | Facility: CLINIC | Age: 83
End: 2023-03-21
Payer: MEDICARE

## 2023-03-21 NOTE — PROGRESS NOTES
C3 nurse attempted to contact Nelsy Marino() for a TCC post hospital discharge follow up call. The patient is unable to conduct the call @ this time. The patient requested a callback.    The patient does not have a scheduled HOSFU appointment within 5-7 days post hospital discharge date 3/20/23. [facility].

## 2023-03-28 ENCOUNTER — TELEPHONE (OUTPATIENT)
Dept: VASCULAR SURGERY | Facility: CLINIC | Age: 83
End: 2023-03-28
Payer: MEDICARE

## 2023-03-28 NOTE — TELEPHONE ENCOUNTER
Try calling Pt left a message for Mr Marino to call the office.      ----- Message from Amrit Terry sent at 3/28/2023 10:38 AM CDT -----  Regarding:  [phone]  Type: Patient Call Back    Who called:      What is the request in detail:called stating that the pt has been trying to reach the doctor in regards to being in pain, would like a call back as soon as possible.     Can the clinic reply by [facility]?  No     Would the patient rather a call back or a response via [facility]? Call back     Best call back number:  [phone]    Additional Information:    Thank you.

## 2023-03-29 ENCOUNTER — INITIAL CONSULT (OUTPATIENT)
Dept: VASCULAR SURGERY | Facility: CLINIC | Age: 83
End: 2023-03-29
Payer: MEDICARE

## 2023-03-29 ENCOUNTER — HOSPITAL ENCOUNTER (INPATIENT)
Facility: HOSPITAL | Age: 83
LOS: 13 days | Discharge: HOME HEALTH | DRG: 901 | End: 2023-04-11
Attending: EMERGENCY MEDICINE | Admitting: SURGERY
Payer: MEDICARE

## 2023-03-29 VITALS
DIASTOLIC BLOOD PRESSURE: 67 MMHG | WEIGHT: 147.69 LBS | BODY MASS INDEX: 25.35 KG/M2 | HEART RATE: 87 BPM | SYSTOLIC BLOOD PRESSURE: 106 MMHG

## 2023-03-29 DIAGNOSIS — M79.606 LEG PAIN: ICD-10-CM

## 2023-03-29 DIAGNOSIS — Z95.828 S/P FEMORAL-FEMORAL BYPASS SURGERY: Primary | ICD-10-CM

## 2023-03-29 DIAGNOSIS — I73.9 PAD (PERIPHERAL ARTERY DISEASE): Primary | ICD-10-CM

## 2023-03-29 DIAGNOSIS — D64.9 ACUTE ON CHRONIC ANEMIA: ICD-10-CM

## 2023-03-29 DIAGNOSIS — I70.229 CRITICAL LIMB ISCHEMIA WITH HISTORY OF REVASCULARIZATION OF SAME EXTREMITY: ICD-10-CM

## 2023-03-29 DIAGNOSIS — T81.30XA WOUND DEHISCENCE: ICD-10-CM

## 2023-03-29 DIAGNOSIS — Z98.890 CRITICAL LIMB ISCHEMIA WITH HISTORY OF REVASCULARIZATION OF SAME EXTREMITY: ICD-10-CM

## 2023-03-29 DIAGNOSIS — K92.1 MELENA: ICD-10-CM

## 2023-03-29 DIAGNOSIS — T82.898A VASCULAR STEAL SYNDROME AFTER PROCEDURE: ICD-10-CM

## 2023-03-29 DIAGNOSIS — R23.4 ESCHAR OF FOOT: ICD-10-CM

## 2023-03-29 DIAGNOSIS — I50.32 DIASTOLIC CHF, CHRONIC: ICD-10-CM

## 2023-03-29 LAB
APTT PPP: 27.4 SEC (ref 21–32)
BASOPHILS # BLD AUTO: 0.03 K/UL (ref 0–0.2)
BASOPHILS NFR BLD: 0.3 % (ref 0–1.9)
CRP SERPL-MCNC: 38 MG/L (ref 0–8.2)
DIFFERENTIAL METHOD: ABNORMAL
EOSINOPHIL # BLD AUTO: 0.4 K/UL (ref 0–0.5)
EOSINOPHIL NFR BLD: 3.2 % (ref 0–8)
ERYTHROCYTE [DISTWIDTH] IN BLOOD BY AUTOMATED COUNT: 16.1 % (ref 11.5–14.5)
ERYTHROCYTE [SEDIMENTATION RATE] IN BLOOD BY PHOTOMETRIC METHOD: 69 MM/HR (ref 0–36)
HCT VFR BLD AUTO: 25 % (ref 37–48.5)
HCV AB SERPL QL IA: NORMAL
HGB BLD-MCNC: 7.5 G/DL (ref 12–16)
HIV 1+2 AB+HIV1 P24 AG SERPL QL IA: NORMAL
IMM GRANULOCYTES # BLD AUTO: 0.05 K/UL (ref 0–0.04)
IMM GRANULOCYTES NFR BLD AUTO: 0.4 % (ref 0–0.5)
INR PPP: 1.2 (ref 0.8–1.2)
LACTATE SERPL-SCNC: 1.8 MMOL/L (ref 0.5–2.2)
LYMPHOCYTES # BLD AUTO: 1.8 K/UL (ref 1–4.8)
LYMPHOCYTES NFR BLD: 15.3 % (ref 18–48)
MCH RBC QN AUTO: 24.9 PG (ref 27–31)
MCHC RBC AUTO-ENTMCNC: 30 G/DL (ref 32–36)
MCV RBC AUTO: 83 FL (ref 82–98)
MONOCYTES # BLD AUTO: 0.5 K/UL (ref 0.3–1)
MONOCYTES NFR BLD: 3.9 % (ref 4–15)
NEUTROPHILS # BLD AUTO: 9.2 K/UL (ref 1.8–7.7)
NEUTROPHILS NFR BLD: 76.9 % (ref 38–73)
NRBC BLD-RTO: 0 /100 WBC
PLATELET # BLD AUTO: 525 K/UL (ref 150–450)
PMV BLD AUTO: 10.6 FL (ref 9.2–12.9)
PROTHROMBIN TIME: 12.5 SEC (ref 9–12.5)
RBC # BLD AUTO: 3.01 M/UL (ref 4–5.4)
WBC # BLD AUTO: 11.94 K/UL (ref 3.9–12.7)

## 2023-03-29 PROCEDURE — 99999 PR PBB SHADOW E&M-EST. PATIENT-LVL III: ICD-10-PCS | Mod: PBBFAC,,, | Performed by: SURGERY

## 2023-03-29 PROCEDURE — 85025 COMPLETE CBC W/AUTO DIFF WBC: CPT

## 2023-03-29 PROCEDURE — 63600175 PHARM REV CODE 636 W HCPCS: Performed by: STUDENT IN AN ORGANIZED HEALTH CARE EDUCATION/TRAINING PROGRAM

## 2023-03-29 PROCEDURE — 85652 RBC SED RATE AUTOMATED: CPT

## 2023-03-29 PROCEDURE — 25000003 PHARM REV CODE 250

## 2023-03-29 PROCEDURE — 99285 EMERGENCY DEPT VISIT HI MDM: CPT | Mod: ,,, | Performed by: EMERGENCY MEDICINE

## 2023-03-29 PROCEDURE — 86140 C-REACTIVE PROTEIN: CPT

## 2023-03-29 PROCEDURE — 99024 PR POST-OP FOLLOW-UP VISIT: ICD-10-PCS | Mod: S$GLB,,, | Performed by: SURGERY

## 2023-03-29 PROCEDURE — 84145 PROCALCITONIN (PCT): CPT | Performed by: EMERGENCY MEDICINE

## 2023-03-29 PROCEDURE — 99285 PR EMERGENCY DEPT VISIT,LEVEL V: ICD-10-PCS | Mod: ,,, | Performed by: EMERGENCY MEDICINE

## 2023-03-29 PROCEDURE — 96375 TX/PRO/DX INJ NEW DRUG ADDON: CPT

## 2023-03-29 PROCEDURE — 87389 HIV-1 AG W/HIV-1&-2 AB AG IA: CPT | Performed by: EMERGENCY MEDICINE

## 2023-03-29 PROCEDURE — 80053 COMPREHEN METABOLIC PANEL: CPT | Performed by: EMERGENCY MEDICINE

## 2023-03-29 PROCEDURE — 85730 THROMBOPLASTIN TIME PARTIAL: CPT | Performed by: STUDENT IN AN ORGANIZED HEALTH CARE EDUCATION/TRAINING PROGRAM

## 2023-03-29 PROCEDURE — 86803 HEPATITIS C AB TEST: CPT | Performed by: EMERGENCY MEDICINE

## 2023-03-29 PROCEDURE — 87040 BLOOD CULTURE FOR BACTERIA: CPT

## 2023-03-29 PROCEDURE — 96367 TX/PROPH/DG ADDL SEQ IV INF: CPT

## 2023-03-29 PROCEDURE — 93005 ELECTROCARDIOGRAM TRACING: CPT

## 2023-03-29 PROCEDURE — 93010 EKG 12-LEAD: ICD-10-PCS | Mod: ,,, | Performed by: INTERNAL MEDICINE

## 2023-03-29 PROCEDURE — 25500020 PHARM REV CODE 255: Performed by: EMERGENCY MEDICINE

## 2023-03-29 PROCEDURE — 99024 POSTOP FOLLOW-UP VISIT: CPT | Mod: S$GLB,,, | Performed by: SURGERY

## 2023-03-29 PROCEDURE — 12000002 HC ACUTE/MED SURGE SEMI-PRIVATE ROOM

## 2023-03-29 PROCEDURE — 99999 PR PBB SHADOW E&M-EST. PATIENT-LVL III: CPT | Mod: PBBFAC,,, | Performed by: SURGERY

## 2023-03-29 PROCEDURE — 83605 ASSAY OF LACTIC ACID: CPT

## 2023-03-29 PROCEDURE — 93010 ELECTROCARDIOGRAM REPORT: CPT | Mod: ,,, | Performed by: INTERNAL MEDICINE

## 2023-03-29 PROCEDURE — 99285 EMERGENCY DEPT VISIT HI MDM: CPT | Mod: 25

## 2023-03-29 PROCEDURE — 63600175 PHARM REV CODE 636 W HCPCS

## 2023-03-29 PROCEDURE — 96365 THER/PROPH/DIAG IV INF INIT: CPT

## 2023-03-29 PROCEDURE — 85610 PROTHROMBIN TIME: CPT | Performed by: STUDENT IN AN ORGANIZED HEALTH CARE EDUCATION/TRAINING PROGRAM

## 2023-03-29 PROCEDURE — 96376 TX/PRO/DX INJ SAME DRUG ADON: CPT

## 2023-03-29 RX ORDER — FUROSEMIDE 20 MG/1
20 TABLET ORAL DAILY
Status: DISCONTINUED | OUTPATIENT
Start: 2023-03-30 | End: 2023-04-11 | Stop reason: HOSPADM

## 2023-03-29 RX ORDER — ONDANSETRON 2 MG/ML
4 INJECTION INTRAMUSCULAR; INTRAVENOUS EVERY 8 HOURS PRN
Status: DISCONTINUED | OUTPATIENT
Start: 2023-03-30 | End: 2023-04-11 | Stop reason: HOSPADM

## 2023-03-29 RX ORDER — ACETAMINOPHEN 500 MG
1000 TABLET ORAL
Status: COMPLETED | OUTPATIENT
Start: 2023-03-29 | End: 2023-03-29

## 2023-03-29 RX ORDER — PANTOPRAZOLE SODIUM 40 MG/1
40 TABLET, DELAYED RELEASE ORAL 2 TIMES DAILY
Status: DISCONTINUED | OUTPATIENT
Start: 2023-03-30 | End: 2023-04-08

## 2023-03-29 RX ORDER — OXYCODONE HYDROCHLORIDE 5 MG/1
5 TABLET ORAL
Status: COMPLETED | OUTPATIENT
Start: 2023-03-29 | End: 2023-03-29

## 2023-03-29 RX ORDER — ASPIRIN 81 MG/1
81 TABLET ORAL DAILY
Status: DISCONTINUED | OUTPATIENT
Start: 2023-03-30 | End: 2023-04-11 | Stop reason: HOSPADM

## 2023-03-29 RX ORDER — HEPARIN SODIUM,PORCINE/D5W 25000/250
0-40 INTRAVENOUS SOLUTION INTRAVENOUS CONTINUOUS
Status: DISCONTINUED | OUTPATIENT
Start: 2023-03-29 | End: 2023-04-04

## 2023-03-29 RX ORDER — CLOPIDOGREL BISULFATE 75 MG/1
75 TABLET ORAL DAILY
Status: DISCONTINUED | OUTPATIENT
Start: 2023-03-30 | End: 2023-04-11 | Stop reason: HOSPADM

## 2023-03-29 RX ORDER — INSULIN ASPART 100 [IU]/ML
1-10 INJECTION, SOLUTION INTRAVENOUS; SUBCUTANEOUS EVERY 6 HOURS PRN
Status: DISCONTINUED | OUTPATIENT
Start: 2023-03-30 | End: 2023-04-11 | Stop reason: HOSPADM

## 2023-03-29 RX ORDER — ATORVASTATIN CALCIUM 40 MG/1
40 TABLET, FILM COATED ORAL NIGHTLY
Status: DISCONTINUED | OUTPATIENT
Start: 2023-03-30 | End: 2023-04-11 | Stop reason: HOSPADM

## 2023-03-29 RX ORDER — GLUCAGON 1 MG
1 KIT INJECTION
Status: DISCONTINUED | OUTPATIENT
Start: 2023-03-30 | End: 2023-04-11 | Stop reason: HOSPADM

## 2023-03-29 RX ORDER — DULOXETIN HYDROCHLORIDE 30 MG/1
30 CAPSULE, DELAYED RELEASE ORAL 2 TIMES DAILY
Status: DISCONTINUED | OUTPATIENT
Start: 2023-03-30 | End: 2023-04-11 | Stop reason: HOSPADM

## 2023-03-29 RX ORDER — SODIUM CHLORIDE 0.9 % (FLUSH) 0.9 %
10 SYRINGE (ML) INJECTION
Status: DISCONTINUED | OUTPATIENT
Start: 2023-03-30 | End: 2023-04-11 | Stop reason: HOSPADM

## 2023-03-29 RX ORDER — CARVEDILOL 12.5 MG/1
12.5 TABLET ORAL 2 TIMES DAILY
Status: DISCONTINUED | OUTPATIENT
Start: 2023-03-30 | End: 2023-04-11 | Stop reason: HOSPADM

## 2023-03-29 RX ORDER — AMLODIPINE BESYLATE 10 MG/1
10 TABLET ORAL DAILY
Status: DISCONTINUED | OUTPATIENT
Start: 2023-03-30 | End: 2023-04-11 | Stop reason: HOSPADM

## 2023-03-29 RX ORDER — OXYCODONE HYDROCHLORIDE 5 MG/1
5 TABLET ORAL EVERY 4 HOURS PRN
Status: DISCONTINUED | OUTPATIENT
Start: 2023-03-30 | End: 2023-04-02

## 2023-03-29 RX ADMIN — PIPERACILLIN SODIUM AND TAZOBACTAM SODIUM 4.5 G: 4; .5 INJECTION, POWDER, FOR SOLUTION INTRAVENOUS at 09:03

## 2023-03-29 RX ADMIN — VANCOMYCIN HYDROCHLORIDE 1500 MG: 1.5 INJECTION, POWDER, LYOPHILIZED, FOR SOLUTION INTRAVENOUS at 11:03

## 2023-03-29 RX ADMIN — IOHEXOL 100 ML: 350 INJECTION, SOLUTION INTRAVENOUS at 10:03

## 2023-03-29 RX ADMIN — ACETAMINOPHEN 1000 MG: 500 TABLET ORAL at 08:03

## 2023-03-29 RX ADMIN — HEPARIN SODIUM 18 UNITS/KG/HR: 10000 INJECTION, SOLUTION INTRAVENOUS at 11:03

## 2023-03-29 RX ADMIN — OXYCODONE HYDROCHLORIDE 5 MG: 5 TABLET ORAL at 10:03

## 2023-03-29 NOTE — ED PROVIDER NOTES
Encounter Date: 3/29/2023       History     Chief Complaint   Patient presents with    admission     Mrs. Nelsy Marino is a 82-year-old female with past medical history significant for CHF, diabetes, peripheral artery disease, hypertension presenting after vascular surgery due to leg pain.  Patient had a fem-fem bypass a few weeks ago accompanied with a right TMA.  Patient has had significant left leg pain since the surgery however she is had worsening pain over the last 2 days.  Patient visited her surgeon office today and there was concerns for wound dehiscence at the left femoral site.  Patient is also reporting chills.  Patient has no pain at her right TMA site.  Patient denies abdominal pain, nausea ,vomiting, diarrhea.      Review of patient's allergies indicates:   Allergen Reactions    Motrin [ibuprofen] Itching     Past Medical History:   Diagnosis Date    CHF (congestive heart failure)     Diabetes mellitus     DVT (deep venous thrombosis)     Hypertension     Miscarriage      Past Surgical History:   Procedure Laterality Date     femoral vascular surgery       ANGIOGRAPHY OF LOWER EXTREMITY Right 2/23/2023    Procedure: Angiogram Extremity Unilateral;  Surgeon: Danny Dunbar MD;  Location: [facility];  Service: Vascular;  Laterality: Right;  RN PHONE PREOP 2/20/2023----CK CONSENT    CREATION OF FEMORAL-FEMORAL ARTERY BYPASS WITH GRAFT Bilateral 3/2/2023    Procedure: CREATION, BYPASS, ARTERIAL, FEMORAL TO FEMORAL, USING GRAFT, LEFT FEMORAL ENDARTERECTOMY, LEFT ILIAC ARTERY STENT PLACEMENT, RIGHT LOWER EXTREMITY ANGIOGRAM AND RIGHT SFA  ANGIOPLASTY;  Surgeon: Danny Dunbar MD;  Location: [address];  Service: Vascular;  Laterality: Bilateral;   744.47 MGY  175.36 Gycm  Flouro time 20.5 mins      CREATION OF ILIOFEMORAL ARTERY BYPASS Right 3/20/2019    Procedure: CREATION, BYPASS, ARTERIAL, ILIAC TO FEMORAL, RIGHT LOWER EXTREMITY, RIGHT PROFUNDAPLASTY;  Surgeon: Danny Dunbar MD;   Location: [facility];  Service: Vascular;  Laterality: Right;  11:00AM START PER ANNE RICHARDS PREOP 3/13/2019  ACT'S, CELL SAVER, GRAFTS, BOOK WATER---NEED H/P  CONSENT IN AM------HGBA1C    ESOPHAGOGASTRODUODENOSCOPY N/A 3/17/2023    Procedure: EGD (ESOPHAGOGASTRODUODENOSCOPY);  Surgeon: Gee Rodriguez MD;  Location: [facility] ENDO (2ND FLR);  Service: Endoscopy;  Laterality: N/A;    FOOT AMPUTATION THROUGH METATARSAL Right 3/4/2023    Procedure: AMPUTATION, FOOT, TRANSMETATARSAL;  Surgeon: Benjamin Godfrey DPM;  [facility];  Service: Podiatry;  Laterality: Right;    HYSTERECTOMY      ?unsure cervix present    INCISION AND DRAINAGE Right 2019    Procedure: Incision and Drainage - R groin washout, possible muscle flap;  Surgeon: Danny Dunbar MD;  [facility];  Service: Vascular;  Laterality: Right;  groin washout    INCISION AND DRAINAGE OF GROIN Right 5/3/2019    Procedure: Incision and Drainage R groin;  Surgeon: Danny Dunbar MD;  [facility];  Service: Vascular;  Laterality: Right;     Family History   Problem Relation Age of Onset    Diabetes Father     Hypertension Father      Social History     Tobacco Use    Smoking status: Former     Types: Cigarettes     Quit date:      Years since quittin.2    Smokeless tobacco: Never   Substance Use Topics    Alcohol use: No    Drug use: No     Review of Systems   Constitutional:  Positive for chills. Negative for fever.   HENT:  Negative for rhinorrhea and sore throat.    Respiratory:  Negative for cough, shortness of breath and wheezing.    Cardiovascular:  Negative for chest pain and leg swelling.   Gastrointestinal:  Negative for abdominal pain, blood in stool, constipation, diarrhea, nausea and vomiting.   Genitourinary:  Negative for dysuria and hematuria.   Musculoskeletal:  Positive for arthralgias and myalgias.   Skin:  Negative for rash.   Neurological:  Negative for dizziness, light-headedness  and headaches.   All other systems reviewed and are negative.    Physical Exam     Initial Vitals [03/29/23 1735]   BP Pulse Resp Temp SpO2   (!) 152/58 88 15 97.9 °F (36.6 °C) 100 %      MAP       --         Physical Exam    Nursing note and vitals reviewed.  Constitutional: She appears well-developed.   Elderly   Uncomfortable appearing   HENT:   Head: Normocephalic and atraumatic.   Eyes: Pupils are equal, round, and reactive to light.   Neck:   Normal range of motion.  Cardiovascular:  Normal rate and regular rhythm.           Unable to Doppler pulses at DP, TP, popliteal areas   Pulmonary/Chest: Breath sounds normal. No respiratory distress.   Abdominal: Abdomen is soft. There is no abdominal tenderness.   Musculoskeletal:      Cervical back: Normal range of motion.      Comments: Right TMA healing without significant purulence     Neurological: She is alert and oriented to person, place, and time.   Noted weakness to left foot  Sensation intact distally   Skin: Skin is dry.   Left femoral site wound has white to yellowish granulation tissue with areas that are open  Scant purulence noted at the distal aspect of the wound  See picture   Right femoral site clean dry and intact\  Left extremity is cooler than the right       ED Course   Procedures  Labs Reviewed   CBC W/ AUTO DIFFERENTIAL - Abnormal; Notable for the following components:       Result Value    RBC 3.01 (*)     Hemoglobin 7.5 (*)     Hematocrit 25.0 (*)     MCH 24.9 (*)     MCHC 30.0 (*)     RDW 16.1 (*)     Platelets 525 (*)     Gran # (ANC) 9.2 (*)     Immature Grans (Abs) 0.05 (*)     Gran % 76.9 (*)     Lymph % 15.3 (*)     Mono % 3.9 (*)     All other components within normal limits    Narrative:     Release to patient->Immediate   SEDIMENTATION RATE - Abnormal; Notable for the following components:    Sed Rate 69 (*)     All other components within normal limits   C-REACTIVE PROTEIN - Abnormal; Notable for the following components:    CRP  38.0 (*)     All other components within normal limits   COMPREHENSIVE METABOLIC PANEL - Abnormal; Notable for the following components:    Glucose 190 (*)     BUN 46 (*)     Albumin 2.4 (*)     ALT 8 (*)     eGFR 45.2 (*)     All other components within normal limits   PREALBUMIN - Abnormal; Notable for the following components:    Prealbumin 16 (*)     All other components within normal limits   APTT - Abnormal; Notable for the following components:    aPTT 138.5 (*)     All other components within normal limits    Narrative:     PTT daily if no changes in infusion rate   CBC W/ AUTO DIFFERENTIAL - Abnormal; Notable for the following components:    RBC 2.63 (*)     Hemoglobin 6.3 (*)     Hematocrit 22.0 (*)     MCH 24.0 (*)     MCHC 28.6 (*)     RDW 16.2 (*)     Gran % 73.3 (*)     Lymph % 17.8 (*)     All other components within normal limits   TYPE & SCREEN - Abnormal; Notable for the following components:    Indirect Josselyn POS (*)     All other components within normal limits   HIV 1 / 2 ANTIBODY    Narrative:     Release to patient->Immediate   HEPATITIS C ANTIBODY    Narrative:     Release to patient->Immediate   LACTIC ACID, PLASMA    Narrative:     Release to patient->Immediate   APTT    Narrative:     Draw baseline aPTT prior to starting the heparin bolus or  infusion  (if patient is on warfarin prior to heparin therapy)   PROTIME-INR    Narrative:     Draw baseline aPTT prior to starting the heparin bolus or  infusion  (if patient is on warfarin prior to heparin therapy)   PROCALCITONIN   MAGNESIUM   PHOSPHORUS   PROCALCITONIN    Narrative:     Release to patient->Immediate  add on pcal #[id number] per Dusty Israel MD  03/30/2023    04:02    POCT GLUCOSE MONITORING CONTINUOUS     EKG Readings: (Independently Interpreted)   Normal sinus rhythm with normal intervals, normal axis, no acute ST-T wave changes   ECG Results              EKG 12-lead (Final result)  Result time 03/30/23 14:52:28       Final result by Interface, Lab In [facility] (03/30/23 14:52:28)                   Narrative:    Test Reason : M79.606,    Vent. Rate : 088 BPM     Atrial Rate : 088 BPM     P-R Int : 172 ms          QRS Dur : 080 ms      QT Int : 342 ms       P-R-T Axes : 051 039 051 degrees     QTc Int : 413 ms    Normal sinus rhythm  Normal ECG  When compared with ECG of 08-MAR-2023 19:20,  Non-specific change in ST segment in Inferior leads  Nonspecific T wave abnormality, improved in Inferior leads  T wave inversion no longer evident in Lateral leads  Confirmed by Luke Forman MD ([id number]) on 3/30/2023 2:52:23 PM    Referred By: [email or web address]   SELF           Confirmed By:Lkue Forman MD                                  Imaging Results              CTA Runoff ABD PEL Bilat Lower Ext (Final result)  Result time 03/30/23 00:23:56      Final result by Mumtaz Fall MD (03/30/23 00:23:56)                   Impression:      Extensive calcific atherosclerosis grossly patent bilateral three-vessel runoff.    Interval placement of new femoral-femoral bypass graft placement.  Old femoral-femoral bypass graft remains occluded.  Short segment occlusion versus severe stenosis of the proximal right femoral artery.  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  Significant stenosis of the left femoral artery beyond the anastomosis with distal reconstitution.    Moderate soft tissue edema with trace fluid collection in the left groin potentially related to recent surgery.  Sterility of this collection cannot be confirmed with CT.    Bibasilar ground-glass attenuation with interlobular septal thickening which could be related to chronic interstitial lung disease or low-grade infectious/inflammatory process.    Additional findings as above.    Electronically signed by resident: Aide Varela  Date:    03/29/2023  Time:    23:01    Electronically signed by: Mumtaz Fall MD  Date:    03/30/2023  Time:    00:23                Narrative:    EXAMINATION:  CTA RUNOFF ABD PEL BILAT LOWER EXT    CLINICAL HISTORY:  Claudication or leg ischemia;    TECHNIQUE:  CTA of abdomen, pelvis, and bilateral lower extremities performed with 125 mL Omnipaque 350 intravenous contrast.    COMPARISON:  Ultrasound lower extremity veins 03/07/2023, CT chest abdomen pelvis 03/10/2023, CTA runoff 02/13/2023.    FINDINGS:  Vascular:    Aorta: Moderate calcific atherosclerosis of the aorta. Aorta is normal caliber with no dissection, or aneurysm.  Severe calcific atherosclerosis of the iliac arteries down to the extremities bilaterally.    Redemonstration of complete occlusion of the old femoral-femoral bypass.  There is new lower abdominal wall postoperative changes of femoral bypass graft creation with appropriate opacification through the graft with no evidence of occlusion.  There is extensive soft tissue stranding and trace of fluid around the left femoral artery (axial series image 623).  No organized fluid collection.    Right:    Common iliac artery: Occluded    External iliac artery: Occluded    Common femoral artery: Occluded    Femoral artery: Stable probable pseudoaneurysm in the proximal femoral artery.  Occlusion or severe stenosis of the right SFA with distal reconstitution.  Stable occluded by femoral bypass graft.  New bi femoral bypass graft is grossly patent.  Reconstitution of the right SFA beyond the bypass graft.    Popliteal artery: Patent    Anterior tibial artery: Grossly patent with mild intermittent stenosis.    Posterior tibial artery: Grossly patent with mild intermittent stenosis.    Peroneal artery: Grossly patent with mild intermittent stenosis.    Left:    Common iliac artery: Patent    External iliac artery: Patent    Common femoral artery: Patent    Femoral artery: Femoral graft placement.  Severe stenosis of the femoral artery distal to the anastomosis with distal reconstitution.  Multifocal intermittent stenosis throughout the  left femoral artery.    Popliteal artery: Patent    Anterior tibial artery: Patent    Posterior tibial artery: Patent    Peroneal artery: Patent with multifocal intermittent stenosis.    Nonvascular:    Heart: No cardiomegaly.  Trace of pericardial effusion. Multiple coronaries calcific atherosclerosis.    Lung Bases: Bibasal ground-glass opacities and interlobular wall thickening we reticulations suggestive underlining small airway disease with subpleural scarring.  No pleural effusion.    Liver: Normal in size and attenuation without focal hepatic abnormality.    Gallbladder: Cholelithiasis with no gallbladder wall thickening or pericholecystic fluid collection.    Bile Ducts: No intra or extrahepatic biliary ductal dilation.    Pancreas: No pancreatic mass lesion or peripancreatic inflammatory change.    Spleen: Normal.    Adrenals: Normal.    Genitourinary: Normal in size and location.Normal enhancement.  No focal renal abnormality, nephrolithiasis, or hydroureteronephrosis.  1.8 cm left renal cyst.  Bladder demonstrates smooth contours without bladder wall thickening.    Reproductive organs: Normal.    GI tract/Mesentery: Stomach is normal appearance. Visualized loops of small and large bowel are normal in caliber without evidence for inflammation or obstruction.  Appendix is unremarkable    Peritoneal Space: No abdominopelvic ascites or intraperitoneal free air.    Retroperitoneum: No significant adenopathy.    Bones: Multilevel degenerative changes.  No acute fracture.                                       Medications   heparin 25,000 units in dextrose 5% 250 mL (100 units/mL) infusion HIGH INTENSITY nomogram - [facility] (0 Units/kg/hr × 59.6 kg (Adjusted) Intravenous Canceled Entry 3/31/23 9521)   heparin 25,000 units in dextrose 5% (100 units/ml) IV bolus from bag - ADDITIONAL PRN BOLUS - 60 units/kg (has no administration in time range)   heparin 25,000 units in dextrose 5% (100 units/ml) IV bolus from bag -  ADDITIONAL PRN BOLUS - 30 units/kg (has no administration in time range)   amLODIPine tablet 10 mg (10 mg Oral Given 3/31/23 1255)   aspirin EC tablet 81 mg (81 mg Oral Given 3/31/23 1255)   atorvastatin tablet 40 mg (40 mg Oral Given 3/30/23 2100)   carvediloL tablet 12.5 mg (12.5 mg Oral Given 3/31/23 1254)   clopidogreL tablet 75 mg (75 mg Oral Given 3/31/23 1254)   furosemide tablet 20 mg (20 mg Oral Given 3/31/23 1255)   DULoxetine DR capsule 30 mg (30 mg Oral Given 3/31/23 1254)   pantoprazole EC tablet 40 mg (40 mg Oral Given 3/31/23 1254)   sodium chloride 0.9% flush 10 mL (has no administration in time range)   oxyCODONE immediate release tablet 5 mg (5 mg Oral Given 3/31/23 1254)   ondansetron injection 4 mg (has no administration in time range)   dextrose 10% bolus 125 mL 125 mL (has no administration in time range)   glucagon (human recombinant) injection 1 mg (has no administration in time range)   insulin aspart U-100 pen 1-10 Units (6 Units Subcutaneous Given 3/31/23 1253)   0.9%  NaCl infusion (for blood administration) (has no administration in time range)   ammonium lactate 12 % lotion (has no administration in time range)   DAPTOmycin (CUBICIN) 400 mg in sodium chloride 0.9% SolP 50 mL IVPB (0 mg Intravenous Stopped 3/30/23 1924)   oxyCODONE immediate release tablet Tab 10 mg (has no administration in time range)   HYDROmorphone injection 0.5 mg (0.5 mg Intravenous Given 3/31/23 1532)   ceFEPIme (MAXIPIME) 2 g in dextrose 5 % in water (D5W) 5 % 50 mL IVPB (MB+) (2 g Intravenous New Bag 3/31/23 1539)   acetaminophen tablet 1,000 mg (1,000 mg Oral Given 3/29/23 2022)   heparin 25,000 units in dextrose 5% (100 units/ml) IV bolus from bag INITIAL BOLUS (4,768 Units Intravenous Bolus from Bag 3/29/23 2230)   piperacillin-tazobactam (ZOSYN) 4.5 g in dextrose 5 % in water (D5W) 5 % 100 mL IVPB (MB+) (0 g Intravenous Stopped 3/29/23 2122)   oxyCODONE immediate release tablet 5 mg (5 mg Oral Given 3/29/23  5625)   vancomycin 1,500 mg in dextrose 5 % (D5W) 250 mL IVPB (Vial-Mate) (0 mg Intravenous Stopped 3/30/23 0032)   iohexoL (OMNIPAQUE 350) injection 100 mL (100 mLs Intravenous Given 3/29/23 2210)     Medical Decision Making:   Initial Assessment:   82-year-old female with significant history of peripheral artery disease with recent fem-fem bypass and right TMA presenting with significant left lower extremity pain and Dr. Gerardo site wound dehiscence.  Differential Diagnosis:   Differential includes but is not limited to wound dehiscence, wound infection, critical limb ischemia, sepsis  Clinical Tests:   Lab Tests: Ordered and Reviewed  Radiological Study: Ordered and Reviewed  Medical Tests: Ordered and Reviewed  ED Management:  CBC does not show significant leukocytosis but does show normocytic anemia.  Normal INR sed rate elevated to 69.  CRP elevated at 38.  Normal lactic acid.  No significant concerns for sepsis.  Consulted vascular surgery, advised to start vanc and Zosyn.  They have started heparin drip and ordered CTA runoff scan which showed an occlusion.  Patient admitted to vascular surgery service.          Attending Attestation:   Physician Attestation Statement for Resident:  As the supervising MD   Physician Attestation Statement: I have personally seen and examined this patient.   I agree with the above history.  -:   As the supervising MD I agree with the above PE.     As the supervising MD I agree with the above treatment, course, plan, and disposition.    I have reviewed and agree with the residents interpretation of the following: lab data and CT scans.  I have reviewed the following: old records at this facility.              ED Course as of 03/31/23 1615   Wed Mar 29, 2023   2153 Hemoglobin(!): 7.5  baseline [JR]      ED Course User Index  [JR] Brenda Birch MD                 Clinical Impression:   Final diagnoses:  [M79.606] Leg pain  [I73.9] PAD (peripheral artery disease) (Primary)         ED Disposition Condition    Admit Stable                William Godfrey DO  Resident  03/30/23 0018       Brenda Birch MD  03/31/23 8449

## 2023-03-30 ENCOUNTER — ANESTHESIA EVENT (OUTPATIENT)
Dept: SURGERY | Facility: HOSPITAL | Age: 83
DRG: 901 | End: 2023-03-30
Payer: MEDICARE

## 2023-03-30 PROBLEM — R23.4 ESCHAR OF FOOT: Status: ACTIVE | Noted: 2023-03-30

## 2023-03-30 LAB
ABO + RH BLD: ABNORMAL
ALBUMIN SERPL BCP-MCNC: 2.4 G/DL (ref 3.5–5.2)
ALP SERPL-CCNC: 64 U/L (ref 55–135)
ALT SERPL W/O P-5'-P-CCNC: 8 U/L (ref 10–44)
ANION GAP SERPL CALC-SCNC: 14 MMOL/L (ref 8–16)
APTT PPP: 138.5 SEC (ref 21–32)
APTT PPP: 56.1 SEC (ref 21–32)
APTT PPP: 66.9 SEC (ref 21–32)
AST SERPL-CCNC: 20 U/L (ref 10–40)
BASOPHILS # BLD AUTO: 0.03 K/UL (ref 0–0.2)
BASOPHILS NFR BLD: 0.3 % (ref 0–1.9)
BILIRUB SERPL-MCNC: 0.5 MG/DL (ref 0.1–1)
BLD GP AB SCN CELLS X3 SERPL QL: ABNORMAL
BLD PROD TYP BPU: NORMAL
BLD PROD TYP BPU: NORMAL
BLOOD GROUP ANTIBODIES SERPL: NORMAL
BLOOD UNIT EXPIRATION DATE: NORMAL
BLOOD UNIT EXPIRATION DATE: NORMAL
BLOOD UNIT TYPE CODE: 1700
BLOOD UNIT TYPE CODE: 1700
BLOOD UNIT TYPE: NORMAL
BLOOD UNIT TYPE: NORMAL
BUN SERPL-MCNC: 46 MG/DL (ref 8–23)
CALCIUM SERPL-MCNC: 9.1 MG/DL (ref 8.7–10.5)
CHLORIDE SERPL-SCNC: 101 MMOL/L (ref 95–110)
CO2 SERPL-SCNC: 24 MMOL/L (ref 23–29)
CODING SYSTEM: NORMAL
CODING SYSTEM: NORMAL
CREAT SERPL-MCNC: 1.2 MG/DL (ref 0.5–1.4)
CROSSMATCH INTERPRETATION: NORMAL
CROSSMATCH INTERPRETATION: NORMAL
DIFFERENTIAL METHOD: ABNORMAL
DISPENSE STATUS: NORMAL
DISPENSE STATUS: NORMAL
EOSINOPHIL # BLD AUTO: 0.4 K/UL (ref 0–0.5)
EOSINOPHIL NFR BLD: 3.7 % (ref 0–8)
ERYTHROCYTE [DISTWIDTH] IN BLOOD BY AUTOMATED COUNT: 16.2 % (ref 11.5–14.5)
EST. GFR  (NO RACE VARIABLE): 45.2 ML/MIN/1.73 M^2
GLUCOSE SERPL-MCNC: 190 MG/DL (ref 70–110)
GLUCOSE SERPL-MCNC: 237 MG/DL (ref 70–110)
HCT VFR BLD AUTO: 22 % (ref 37–48.5)
HGB BLD-MCNC: 6.3 G/DL (ref 12–16)
IMM GRANULOCYTES # BLD AUTO: 0.03 K/UL (ref 0–0.04)
IMM GRANULOCYTES NFR BLD AUTO: 0.3 % (ref 0–0.5)
LYMPHOCYTES # BLD AUTO: 1.7 K/UL (ref 1–4.8)
LYMPHOCYTES NFR BLD: 17.8 % (ref 18–48)
MAGNESIUM SERPL-MCNC: 2.1 MG/DL (ref 1.6–2.6)
MCH RBC QN AUTO: 24 PG (ref 27–31)
MCHC RBC AUTO-ENTMCNC: 28.6 G/DL (ref 32–36)
MCV RBC AUTO: 84 FL (ref 82–98)
MONOCYTES # BLD AUTO: 0.4 K/UL (ref 0.3–1)
MONOCYTES NFR BLD: 4.6 % (ref 4–15)
NEUTROPHILS # BLD AUTO: 7 K/UL (ref 1.8–7.7)
NEUTROPHILS NFR BLD: 73.3 % (ref 38–73)
NRBC BLD-RTO: 0 /100 WBC
NUM UNITS TRANS PACKED RBC: NORMAL
NUM UNITS TRANS PACKED RBC: NORMAL
PHOSPHATE SERPL-MCNC: 3.8 MG/DL (ref 2.7–4.5)
PLATELET # BLD AUTO: 418 K/UL (ref 150–450)
PMV BLD AUTO: 10.5 FL (ref 9.2–12.9)
POTASSIUM SERPL-SCNC: 4.8 MMOL/L (ref 3.5–5.1)
PREALB SERPL-MCNC: 16 MG/DL (ref 20–43)
PROCALCITONIN SERPL IA-MCNC: 0.16 NG/ML
PROT SERPL-MCNC: 7.5 G/DL (ref 6–8.4)
RBC # BLD AUTO: 2.63 M/UL (ref 4–5.4)
SODIUM SERPL-SCNC: 139 MMOL/L (ref 136–145)
SPECIMEN OUTDATE: ABNORMAL
WBC # BLD AUTO: 9.55 K/UL (ref 3.9–12.7)

## 2023-03-30 PROCEDURE — 86900 BLOOD TYPING SEROLOGIC ABO: CPT | Performed by: STUDENT IN AN ORGANIZED HEALTH CARE EDUCATION/TRAINING PROGRAM

## 2023-03-30 PROCEDURE — 86902 BLOOD TYPE ANTIGEN DONOR EA: CPT | Performed by: STUDENT IN AN ORGANIZED HEALTH CARE EDUCATION/TRAINING PROGRAM

## 2023-03-30 PROCEDURE — 84134 ASSAY OF PREALBUMIN: CPT | Performed by: STUDENT IN AN ORGANIZED HEALTH CARE EDUCATION/TRAINING PROGRAM

## 2023-03-30 PROCEDURE — 85025 COMPLETE CBC W/AUTO DIFF WBC: CPT | Performed by: STUDENT IN AN ORGANIZED HEALTH CARE EDUCATION/TRAINING PROGRAM

## 2023-03-30 PROCEDURE — 36415 COLL VENOUS BLD VENIPUNCTURE: CPT | Performed by: SURGERY

## 2023-03-30 PROCEDURE — 85730 THROMBOPLASTIN TIME PARTIAL: CPT | Mod: 91 | Performed by: SURGERY

## 2023-03-30 PROCEDURE — 36430 TRANSFUSION BLD/BLD COMPNT: CPT

## 2023-03-30 PROCEDURE — 83735 ASSAY OF MAGNESIUM: CPT | Performed by: STUDENT IN AN ORGANIZED HEALTH CARE EDUCATION/TRAINING PROGRAM

## 2023-03-30 PROCEDURE — 84100 ASSAY OF PHOSPHORUS: CPT | Performed by: STUDENT IN AN ORGANIZED HEALTH CARE EDUCATION/TRAINING PROGRAM

## 2023-03-30 PROCEDURE — 25000003 PHARM REV CODE 250: Performed by: PHYSICIAN ASSISTANT

## 2023-03-30 PROCEDURE — P9016 RBC LEUKOCYTES REDUCED: HCPCS | Performed by: STUDENT IN AN ORGANIZED HEALTH CARE EDUCATION/TRAINING PROGRAM

## 2023-03-30 PROCEDURE — 99024 PR POST-OP FOLLOW-UP VISIT: ICD-10-PCS | Mod: ,,, | Performed by: PODIATRIST

## 2023-03-30 PROCEDURE — 86870 RBC ANTIBODY IDENTIFICATION: CPT | Performed by: STUDENT IN AN ORGANIZED HEALTH CARE EDUCATION/TRAINING PROGRAM

## 2023-03-30 PROCEDURE — 20600001 HC STEP DOWN PRIVATE ROOM

## 2023-03-30 PROCEDURE — 25000003 PHARM REV CODE 250: Performed by: STUDENT IN AN ORGANIZED HEALTH CARE EDUCATION/TRAINING PROGRAM

## 2023-03-30 PROCEDURE — 86922 COMPATIBILITY TEST ANTIGLOB: CPT | Performed by: STUDENT IN AN ORGANIZED HEALTH CARE EDUCATION/TRAINING PROGRAM

## 2023-03-30 PROCEDURE — 63600175 PHARM REV CODE 636 W HCPCS: Performed by: PHYSICIAN ASSISTANT

## 2023-03-30 PROCEDURE — 85730 THROMBOPLASTIN TIME PARTIAL: CPT | Performed by: STUDENT IN AN ORGANIZED HEALTH CARE EDUCATION/TRAINING PROGRAM

## 2023-03-30 PROCEDURE — 94761 N-INVAS EAR/PLS OXIMETRY MLT: CPT

## 2023-03-30 PROCEDURE — 63600175 PHARM REV CODE 636 W HCPCS: Performed by: STUDENT IN AN ORGANIZED HEALTH CARE EDUCATION/TRAINING PROGRAM

## 2023-03-30 PROCEDURE — 99223 PR INITIAL HOSPITAL CARE,LEVL III: ICD-10-PCS | Mod: ,,, | Performed by: PHYSICIAN ASSISTANT

## 2023-03-30 PROCEDURE — 99223 1ST HOSP IP/OBS HIGH 75: CPT | Mod: ,,, | Performed by: PHYSICIAN ASSISTANT

## 2023-03-30 PROCEDURE — 99024 POSTOP FOLLOW-UP VISIT: CPT | Mod: ,,, | Performed by: PODIATRIST

## 2023-03-30 RX ORDER — HYDROCODONE BITARTRATE AND ACETAMINOPHEN 500; 5 MG/1; MG/1
TABLET ORAL
Status: DISCONTINUED | OUTPATIENT
Start: 2023-03-30 | End: 2023-04-06

## 2023-03-30 RX ORDER — AMMONIUM LACTATE 12 G/100G
LOTION TOPICAL DAILY
Status: DISCONTINUED | OUTPATIENT
Start: 2023-03-31 | End: 2023-04-11 | Stop reason: HOSPADM

## 2023-03-30 RX ADMIN — CLOPIDOGREL BISULFATE 75 MG: 75 TABLET ORAL at 10:03

## 2023-03-30 RX ADMIN — HEPARIN SODIUM 14 UNITS/KG/HR: 10000 INJECTION, SOLUTION INTRAVENOUS at 10:03

## 2023-03-30 RX ADMIN — PIPERACILLIN SODIUM AND TAZOBACTAM SODIUM 4.5 G: 4; .5 INJECTION, POWDER, FOR SOLUTION INTRAVENOUS at 03:03

## 2023-03-30 RX ADMIN — CARVEDILOL 12.5 MG: 12.5 TABLET, FILM COATED ORAL at 10:03

## 2023-03-30 RX ADMIN — AMLODIPINE BESYLATE 10 MG: 10 TABLET ORAL at 10:03

## 2023-03-30 RX ADMIN — FUROSEMIDE 20 MG: 20 TABLET ORAL at 10:03

## 2023-03-30 RX ADMIN — CARVEDILOL 12.5 MG: 12.5 TABLET, FILM COATED ORAL at 09:03

## 2023-03-30 RX ADMIN — DULOXETINE 30 MG: 30 CAPSULE, DELAYED RELEASE ORAL at 10:03

## 2023-03-30 RX ADMIN — ATORVASTATIN CALCIUM 40 MG: 40 TABLET, FILM COATED ORAL at 09:03

## 2023-03-30 RX ADMIN — OXYCODONE 5 MG: 5 TABLET ORAL at 05:03

## 2023-03-30 RX ADMIN — OXYCODONE 5 MG: 5 TABLET ORAL at 06:03

## 2023-03-30 RX ADMIN — PIPERACILLIN SODIUM AND TAZOBACTAM SODIUM 4.5 G: 4; .5 INJECTION, POWDER, FOR SOLUTION INTRAVENOUS at 12:03

## 2023-03-30 RX ADMIN — PANTOPRAZOLE SODIUM 40 MG: 40 TABLET, DELAYED RELEASE ORAL at 09:03

## 2023-03-30 RX ADMIN — PANTOPRAZOLE SODIUM 40 MG: 40 TABLET, DELAYED RELEASE ORAL at 10:03

## 2023-03-30 RX ADMIN — DAPTOMYCIN 400 MG: 350 INJECTION, POWDER, LYOPHILIZED, FOR SOLUTION INTRAVENOUS at 06:03

## 2023-03-30 RX ADMIN — DULOXETINE 30 MG: 30 CAPSULE, DELAYED RELEASE ORAL at 09:03

## 2023-03-30 RX ADMIN — ASPIRIN 81 MG: 81 TABLET, COATED ORAL at 10:03

## 2023-03-30 NOTE — ASSESSMENT & PLAN NOTE
Nelsy Marino is a 82 y.o. woman with history of HTN, T2DM, DVT, and PAD who underwent left to right fem to SFA bypass and right SFA angioplasty on 3/2/23 and was admitted until 3/20. She was seen by Dr. Dunbar in the office earlier today complaining of left rest pain and also found to have dehiscence of her left groin incision.      Admit to vascular surgery  Heparin gtt  vanc zosyn  Will need debridement of left groin wound  Will need revascularization of short segment occlusion left CFA  NPO  Insulin SS  Home meds  Asa plavix statin  Podiatry consult to eval right TMA wound  ID for chronic graft infection and new possibly infected graft

## 2023-03-30 NOTE — ANESTHESIA PREPROCEDURE EVALUATION
03/30/2023  [facility]  Anesthesia Pre-Operative Evaluation         Patient Name: Nelsy Marino  YOB: 1940  MRN: [id number]    SUBJECTIVE:     Pre-operative evaluation for Procedure(s) (LRB):  DEBRIDEMENT, WOUND (Left)     03/30/2023    Nelsy Marino is a 82 y.o. female w/ a significant PMHx of PAD, HTN, HFpEF, T2DM, RLE bypass 03/02 with R TMA 03/04 who is admitted for L groin incision dehiscence.    Patient now presents for the above procedure(s).    Nuclear Stress Test 2/28/23    Normal myocardial perfusion scan. There is no evidence of myocardial ischemia or infarction.    The visually estimated ejection fraction is normal during stress.    There is normal wall motion post stress.      TTE 3/9/23:   The estimated ejection fraction is 65%.   The left ventricle is normal in size with normal systolic function.   Grade II left ventricular diastolic dysfunction.   Normal right ventricular size with normal right ventricular systolic function.   Mild left atrial enlargement.   Mild-to-moderate mitral regurgitation.   There is pulmonary hypertension.   The estimated PA systolic pressure is 55 mmHg.   Intermediate central venous pressure (8 mmHg).    LDA:        Peripheral IV - Single Lumen 03/29/23 2045 20 G Anterior;Left Wrist (Active)   Site Assessment Clean;Dry;Intact 03/30/23 1521   Line Status Saline locked 03/30/23 1521   Dressing Status Clean;Dry;Intact 03/30/23 1521   Dressing Intervention Integrity maintained 03/30/23 1521   Number of days: 0            Peripheral IV - Single Lumen 03/29/23 2322 22 G Anterior;Distal;Right Forearm (Active)   Site Assessment Clean;Dry;Intact 03/30/23 1521   Line Status Saline locked 03/30/23 1521   Dressing Status Clean;Dry;Intact 03/30/23 1521   Dressing Intervention Integrity maintained 03/30/23 1521   Number of days: 0             Peripheral IV - Single Lumen 03/30/23 1226 22 G Left Antecubital (Active)   Site Assessment Clean;Dry;Intact 03/30/23 1521   Line Status Infusing 03/30/23 1521   Dressing Status Clean;Dry;Intact 03/30/23 1521   Dressing Intervention Integrity maintained 03/30/23 1521   Number of days: 0       Prev airway: DL with mathias 2, easy mask     Drips:    heparin (porcine) in D5W 14 Units/kg/hr (03/30/23 1006)       Patient Active Problem List   Diagnosis    Lichen sclerosus et atrophicus    PVD (peripheral vascular disease)    Essential hypertension    Type 2 diabetes mellitus with chronic kidney disease, with long-term current use of insulin    Diastolic CHF, chronic    Obesity (BMI 30-39.9)    Critical right lower limb ischemia    Acute hypoxemic respiratory failure    History of DVT (deep vein thrombosis)    Wound dehiscence    Fluid collection at surgical site    Cellulitis    Postoperative seroma of musculoskeletal structure after non-musculoskeletal procedure    Abscess of right groin    Critical limb ischemia with history of revascularization of same extremity    Sepsis    Hyponatremia    Vascular graft infection    S/P transmetatarsal amputation of foot, right    Leukocytosis    Acute encephalopathy    Bilious vomiting    Upper GI bleed    Diabetic foot infection    Melena    Eschar of foot       Review of patient's allergies indicates:   Allergen Reactions    Motrin [ibuprofen] Itching       Current Inpatient Medications:   amLODIPine  10 mg Oral Daily    [START ON 3/31/2023] ammonium lactate   Topical (Top) Daily    aspirin  81 mg Oral Daily    atorvastatin  40 mg Oral QHS    carvediloL  12.5 mg Oral BID    clopidogreL  75 mg Oral Daily    DAPTOmycin (CUBICIN) IV (PEDS and ADULTS)  6 mg/kg Intravenous Q24H    DULoxetine  30 mg Oral BID    furosemide  20 mg Oral Daily    pantoprazole  40 mg Oral BID       No current facility-administered medications on file prior to  "encounter.     Current Outpatient Medications on File Prior to Encounter   Medication Sig Dispense Refill    amlodipine (NORVASC) 10 MG tablet Take 10 mg by mouth once daily.      apixaban (ELIQUIS) 5 mg Tab Take 1 tablet (5 mg total) by mouth 2 (two) times daily. 60 tablet 0    aspirin (ECOTRIN) 81 MG EC tablet Take 81 mg by mouth once daily.      atorvastatin (LIPITOR) 40 MG tablet Take 1 tablet (40 mg total) by mouth every evening. 30 tablet 0    benzonatate (TESSALON) 100 MG capsule Take 1 capsule (100 mg total) by mouth 3 (three) times daily as needed for Cough. 20 capsule 0    BLOOD SUGAR DIAGNOSTIC (TRUE METRIX GLUCOSE TEST STRIP MISC) by Misc.(Non-Drug; Combo Route) route.      carvediloL (COREG) 12.5 MG tablet Take 12.5 mg by mouth 2 (two) times daily.      cephALEXin (KEFLEX) 500 MG capsule Take 2 capsules (1,000 mg total) by mouth every 12 (twelve) hours. 60 capsule 0    clopidogreL (PLAVIX) 75 mg tablet Take 1 tablet (75 mg total) by mouth once daily. 30 tablet 0    DULoxetine (CYMBALTA) 30 MG capsule Take 1 capsule (30 mg total) by mouth 2 (two) times daily. 60 capsule 0    furosemide (LASIX) 20 MG tablet Take 1 tablet (20 mg total) by mouth once daily. 30 tablet 0    insulin aspart protamine-insulin aspart (NOVOLOG MIX 70-30FLEXPEN U-100) 100 unit/mL (70-30) InPn pen Inject 12 Units into the skin 2 (two) times daily before meals.  0    insulin syringe-needle,dispos. 0.3 mL 30 gauge x 5/16" Syrg by Misc.(Non-Drug; Combo Route) route.      losartan-hydrochlorothiazide 100-25 mg (HYZAAR) 100-25 mg per tablet Take 1 tablet by mouth once daily.      metformin (GLUCOPHAGE) 1000 MG tablet Take 1,000 mg by mouth 2 (two) times daily with meals.      ondansetron (ZOFRAN) 8 MG tablet   0    pantoprazole (PROTONIX) 40 MG tablet Take 1 tablet (40 mg total) by mouth 2 (two) times daily. 60 tablet 1    pen needle, diabetic 32 gauge x 5/32" Ndle Inject 1 each into the skin 4 (four) times daily. 100 " each 0       Past Surgical History:   Procedure Laterality Date     femoral vascular surgery       ANGIOGRAPHY OF LOWER EXTREMITY Right 2/23/2023    Procedure: Angiogram Extremity Unilateral;  Surgeon: Danny Dunbar MD;  Location: [facility] OR;  Service: Vascular;  Laterality: Right;  RN PHONE PREOP 2/20/2023----CK CONSENT    CREATION OF FEMORAL-FEMORAL ARTERY BYPASS WITH GRAFT Bilateral 3/2/2023    Procedure: CREATION, BYPASS, ARTERIAL, FEMORAL TO FEMORAL, USING GRAFT, LEFT FEMORAL ENDARTERECTOMY, LEFT ILIAC ARTERY STENT PLACEMENT, RIGHT LOWER EXTREMITY ANGIOGRAM AND RIGHT SFA  ANGIOPLASTY;  Surgeon: Danyn Dunbar MD;  [facility];  Service: Vascular;  Laterality: Bilateral;   744.47 MGY  175.36 Gycm  Flouro time 20.5 mins      CREATION OF ILIOFEMORAL ARTERY BYPASS Right 3/20/2019    Procedure: CREATION, BYPASS, ARTERIAL, ILIAC TO FEMORAL, RIGHT LOWER EXTREMITY, RIGHT PROFUNDAPLASTY;  Surgeon: Danny Dunbar MD;  Location: [facility] OR;  Service: Vascular;  Laterality: Right;  11:00AM START PER ANNE  RN PREOP 3/13/2019  ACT'S, CELL SAVER, GRAFTS, BOOK WATER---NEED H/P  CONSENT IN AM------HGBA1C    ESOPHAGOGASTRODUODENOSCOPY N/A 3/17/2023    Procedure: EGD (ESOPHAGOGASTRODUODENOSCOPY);  Surgeon: Gee Rodriguez MD;  Location: [facility] ([address] [place] [address]);  Service: Endoscopy;  Laterality: N/A;    FOOT AMPUTATION THROUGH METATARSAL Right 3/4/2023    Procedure: AMPUTATION, FOOT, TRANSMETATARSAL;  Surgeon: Benjamin Godfrey DPM;  [facility];  Service: Podiatry;  Laterality: Right;    HYSTERECTOMY      ?unsure cervix present    INCISION AND DRAINAGE Right 4/12/2019    Procedure: Incision and Drainage - R groin washout, possible muscle flap;  Surgeon: Danny Dunbar MD;  [facility];  Service: Vascular;  Laterality: Right;  groin washout    INCISION AND DRAINAGE OF GROIN Right 5/3/2019    Procedure: Incision and Drainage R groin;  Surgeon: Danny CAGE  MD Bettina;  Location: [facility] OR [address] [place] [address];  Service: Vascular;  Laterality: Right;       OBJECTIVE:     Vital Signs Range (Last 24H):  Temp:  [35.6 °C (96.1 °F)-37.1 °C (98.7 °F)]   Pulse:  [75-90]   Resp:  [13-20]   BP: (124-155)/(58-72)   SpO2:  [91 %-100 %]       Significant Labs:  Lab Results   Component Value Date    WBC 9.55 03/30/2023    HGB 6.3 (L) 03/30/2023    HCT 22.0 (L) 03/30/2023     03/30/2023    CHOL 109 (L) 02/28/2023    TRIG 58 02/28/2023    HDL 38 (L) 02/28/2023    ALT 8 (L) 03/29/2023    AST 20 03/29/2023     03/29/2023    K 4.8 03/29/2023     03/29/2023    CREATININE 1.2 03/29/2023    BUN 46 (H) 03/29/2023    CO2 24 03/29/2023    INR 1.2 03/29/2023    HGBA1C 8.2 (H) 02/28/2023       Diagnostic Studies: No relevant studies.    EKG:   Results for orders placed or performed during the hospital encounter of 03/29/23   EKG 12-lead    Collection Time: 03/29/23  8:18 PM    Narrative    Test Reason : M79.606,    Vent. Rate : 088 BPM     Atrial Rate : 088 BPM     P-R Int : 172 ms          QRS Dur : 080 ms      QT Int : 342 ms       P-R-T Axes : 051 039 051 degrees     QTc Int : 413 ms    Normal sinus rhythm  Normal ECG  When compared with ECG of 08-MAR-2023 19:20,  Non-specific change in ST segment in Inferior leads  Nonspecific T wave abnormality, improved in Inferior leads  T wave inversion no longer evident in Lateral leads  Confirmed by Luke Forman MD ([id number]) on 3/30/2023 2:52:23 PM    Referred By: [email or web address]   SELF           Confirmed By:Luke Forman MD       ASSESSMENT/PLAN:           Pre-op Assessment    I have reviewed the Patient Summary Reports.     I have reviewed the Nursing Notes. I have reviewed the NPO Status.   I have reviewed the Medications.     Review of Systems  Anesthesia Hx:  No problems with previous Anesthesia  History of prior surgery of interest to airway management or planning: Denies Family Hx of Anesthesia complications.   Denies Personal Hx of  Anesthesia complications.   Hematology/Oncology:     Oncology Normal    -- Anemia:   EENT/Dental:EENT/Dental Normal   Cardiovascular:   Hypertension CHF PVD hyperlipidemia    Pulmonary:  Pulmonary Normal    Renal/:   Chronic Renal Disease    Hepatic/GI:  Hepatic/GI Normal    Neurological:  Neurology Normal    Endocrine:   Diabetes, type 2        Physical Exam  General: Well nourished, Cooperative, Alert and Oriented    Airway:  Mallampati: III   Mouth Opening: Normal  TM Distance: Normal  Tongue: Normal  Neck ROM: Normal ROM    Dental:  Dentures        Anesthesia Plan  Type of Anesthesia, risks & benefits discussed:    Anesthesia Type: Gen ETT  Intra-op Monitoring Plan: Standard ASA Monitors and Art Line  Post Op Pain Control Plan: multimodal analgesia and IV/PO Opioids PRN  Induction:  IV  Airway Plan: Direct, Post-Induction  Informed Consent: Informed consent signed with the Patient and all parties understand the risks and agree with anesthesia plan.  All questions answered.   ASA Score: 3  Day of Surgery Review of History & Physical: H&P Update referred to the surgeon/provider.    Ready For Surgery From Anesthesia Perspective.     .

## 2023-03-30 NOTE — NURSING
Received pt from ED in stable condition. Oriented to room, bed controls, call light, etc. Friend at bedside. Will monitor.

## 2023-03-30 NOTE — ASSESSMENT & PLAN NOTE
Nelsy Marino is a 82 y.o. woman with history of HTN, T2DM, DVT, and PAD who underwent left to right fem to SFA bypass and right SFA angioplasty on 3/2/23 and was admitted until 3/20. She was seen by Dr. Dunbar in the office earlier today complaining of left rest pain and also found to have dehiscence of her left groin incision.      Heparin gtt  vanc zosyn  Will need debridement of left groin wound, likely 3/31  Will need revascularization of short segment occlusion left CFA  NPO midnight   Insulin SS  Home meds  Asa plavix statin  Podiatry consult to eval right TMA wound  ID for chronic graft infection and new possibly infected graft

## 2023-03-30 NOTE — PROGRESS NOTES
[facility]  Vascular Surgery  Progress Note    Patient Name: Nelsy Marino  MRN: [id number]  Admission Date: 3/29/2023  Primary Care Provider: Juan F Garay    Subjective:     Interval History: No acute events. VSS. Pain controlled.     Post-Op Info:  * No surgery found *           Medications:  Continuous Infusions:   heparin (porcine) in D5W 14 Units/kg/hr (03/30/23 1006)     Scheduled Meds:   amLODIPine  10 mg Oral Daily    aspirin  81 mg Oral Daily    atorvastatin  40 mg Oral QHS    carvediloL  12.5 mg Oral BID    clopidogreL  75 mg Oral Daily    DULoxetine  30 mg Oral BID    furosemide  20 mg Oral Daily    pantoprazole  40 mg Oral BID    piperacillin-tazobactam (ZOSYN) IVPB  4.5 g Intravenous Q8H    vancomycin (VANCOCIN) IVPB  15 mg/kg Intravenous Q24H     PRN Meds:sodium chloride, dextrose 10%, glucagon (human recombinant), heparin (PORCINE), heparin (PORCINE), insulin aspart U-100, ondansetron, oxyCODONE, sodium chloride 0.9%     Objective:     Vital Signs (Most Recent):  Temp: 96.1 °F (35.6 °C) (03/30/23 1002)  Pulse: 89 (03/30/23 1002)  Resp: 16 (03/30/23 1002)  BP: (!) 143/62 (03/30/23 1033)  SpO2: (!) 94 % (03/30/23 1002)   Vital Signs (24h Range):  Temp:  [96.1 °F (35.6 °C)-98.7 °F (37.1 °C)] 96.1 °F (35.6 °C)  Pulse:  [75-90] 89  Resp:  [13-20] 16  SpO2:  [91 %-100 %] 94 %  BP: (106-155)/(58-72) 143/62         Physical Exam  Constitutional:       General: She is not in acute distress.     Appearance: She is not ill-appearing.   Cardiovascular:      Rate and Rhythm: Normal rate.      Comments: Right: DP Triphasic signal, PT Biphasic signal  Left: DP monophasic signal, PT monophasic signal  Pulmonary:      Effort: Pulmonary effort is normal. No respiratory distress.   Skin:     Comments: Left groin incision with nylon sutures. Dehisced with fibrinous material    Neurological:      Mental Status: She is alert.       Significant Labs:  CBC:   Recent Labs   Lab 03/30/23  0342   WBC 9.55    RBC 2.63*   HGB 6.3*   HCT 22.0*      MCV 84   MCH 24.0*   MCHC 28.6*     CMP:   Recent Labs   Lab 03/29/23  2322   *   CALCIUM 9.1   ALBUMIN 2.4*   PROT 7.5      K 4.8   CO2 24      BUN 46*   CREATININE 1.2   ALKPHOS 64   ALT 8*   AST 20   BILITOT 0.5       Significant Diagnostics:  I have reviewed all pertinent imaging results/findings within the past 24 hours.    Assessment/Plan:     PVD (peripheral vascular disease)  Nelsy Marino is a 82 y.o. woman with history of HTN, T2DM, DVT, and PAD who underwent left to right fem to SFA bypass and right SFA angioplasty on 3/2/23 and was admitted until 3/20. She was seen by Dr. Dunbar in the office earlier today complaining of left rest pain and also found to have dehiscence of her left groin incision.      Heparin gtt  vanc zosyn  Will need debridement of left groin wound, likely 3/31  Will need revascularization of short segment occlusion left CFA  NPO midnight   Insulin SS  Home meds  Asa plavix statin  Podiatry consult to eval right TMA wound  ID for chronic graft infection and new possibly infected graft        Teresa Conley MD  Vascular Surgery  [facility]

## 2023-03-30 NOTE — ED NOTES
Assumed care of patient   Pt awake and alert; resting quietly on stretcher.VS stable, Pt denies pain at this time; no acute distress or discomfort reported or observed.  Pt denies restroom needs at this time; is able to reposition self on stretcher. Bed locked in lowest position; side rails up and locked x 2; call light, bedside table, and personal belongings within reach. Room assessed for safety measures and cleanliness; no action needed at this time. Plan of care discussed.  Pt instructed to alert nurse for assistance and before attempting to get out of bed; verbalizes understanding. Pt denies needs or complaints at this time.  at bedside, watching tv.

## 2023-03-30 NOTE — H&P
[facility]  Vascular Surgery  History and Physical     Patient Name: Nelsy Marino  MRN: [id number]  Admission Date: 3/29/2023  Code Status: Full Code   Attending Physician: Bettina  Primary Care Physician: Juan F Garay    Subjective:     Chief Complaint/Reason for Admission: rest pain, left groin wound dehiscence     HPI:  Nelsy Marino is a 82 y.o. woman with history of HTN, T2DM, DVT, and PAD who underwent left to right fem to SFA bypass and right SFA angioplasty on 3/2/23 and was admitted until 3/20. She was seen by Dr. Dunbar in the office earlier today complaining of left wrist pain and also found to have dehiscence of her left groin incision.  She had a postoperative ARCHIE performed on 03/16 which demonstrated a toe pressure of 0 on the left foot and waveforms and PVR concerning for severe occlusive disease.    On my exam, patient is continuing to have rest pain, per her  she is minimally mobile at home with full assist for any activity.  Denies any fevers, chills, sweats.  Has been using supplemental oxygen 2 L at home however is 99% on room air in the emergency department without any supplemental.  The spouse states that the left groin incision dehisced proximally 3 days ago and has had fibrinous debris on the gauze.  No specific expressed purulence.    Labs are slightly improved from previous 9 days ago (WBC 12 from 13, CRP 38 from 200); CTA with focal occlusion of CFA just distal to takeoff of new bypass at the level of the previous with reconstitution and flow distal via 3 vessel runoff. The Fem-SFA bypass is open and patent to foot. Dehiscence on scan does not appear to involve graft but there are inflammatory changes in the area and hard to definitively rule out graft infection.     Exam monophasic PT/DP on left, triphasic DP, biphasic PT on right. Wound with pain on palpation, sloughing tissue and dead fat, serous fluid from short cavity (did not probe).       (Not in a  hospital admission)      Review of patient's allergies indicates:   Allergen Reactions    Motrin [ibuprofen] Itching       Past Medical History:   Diagnosis Date    CHF (congestive heart failure)     Diabetes mellitus     DVT (deep venous thrombosis)     Hypertension     Miscarriage      Past Surgical History:   Procedure Laterality Date     femoral vascular surgery       ANGIOGRAPHY OF LOWER EXTREMITY Right 2/23/2023    Procedure: Angiogram Extremity Unilateral;  Surgeon: Danny Dunbar MD;  Location: [facility] OR;  Service: Vascular;  Laterality: Right;  RN PHONE PREOP 2/20/2023----CK CONSENT    CREATION OF FEMORAL-FEMORAL ARTERY BYPASS WITH GRAFT Bilateral 3/2/2023    Procedure: CREATION, BYPASS, ARTERIAL, FEMORAL TO FEMORAL, USING GRAFT, LEFT FEMORAL ENDARTERECTOMY, LEFT ILIAC ARTERY STENT PLACEMENT, RIGHT LOWER EXTREMITY ANGIOGRAM AND RIGHT SFA  ANGIOPLASTY;  Surgeon: Danny Dunbar MD;  [facility];  Service: Vascular;  Laterality: Bilateral;   744.47 MGY  175.36 Gycm  Flouro time 20.5 mins      CREATION OF ILIOFEMORAL ARTERY BYPASS Right 3/20/2019    Procedure: CREATION, BYPASS, ARTERIAL, ILIAC TO FEMORAL, RIGHT LOWER EXTREMITY, RIGHT PROFUNDAPLASTY;  Surgeon: Danny Dunbar MD;  Location: [facility];  Service: Vascular;  Laterality: Right;  11:00AM START PER ANNE RICHARDS PREOP 3/13/2019  ACT'S, CELL SAVER, GRAFTS, BOOK WATER---NEED H/P  CONSENT IN AM------HGBA1C    ESOPHAGOGASTRODUODENOSCOPY N/A 3/17/2023    Procedure: EGD (ESOPHAGOGASTRODUODENOSCOPY);  Surgeon: Gee Rodriguez MD;  Location: [facility] ENDO (2ND FLR);  Service: Endoscopy;  Laterality: N/A;    FOOT AMPUTATION THROUGH METATARSAL Right 3/4/2023    Procedure: AMPUTATION, FOOT, TRANSMETATARSAL;  Surgeon: Benjamin Godfrey DPM;  Location: [facility] OR 2ND FLR;  Service: Podiatry;  Laterality: Right;    HYSTERECTOMY      ?unsure cervix present    INCISION AND DRAINAGE Right 4/12/2019    Procedure: Incision and  Drainage - R groin washout, possible muscle flap;  Surgeon: Danny Dunbar MD;  Location: [facility] OR 2ND FLR;  Service: Vascular;  Laterality: Right;  groin washout    INCISION AND DRAINAGE OF GROIN Right 5/3/2019    Procedure: Incision and Drainage R groin;  Surgeon: Danny Dunbar MD;  Location: [facility] OR 2ND FLR;  Service: Vascular;  Laterality: Right;     Family History       Problem Relation (Age of Onset)    Diabetes Father    Hypertension Father          Tobacco Use    Smoking status: Former     Types: Cigarettes     Quit date: 1980     Years since quittin.2    Smokeless tobacco: Never   Substance and Sexual Activity    Alcohol use: No    Drug use: No    Sexual activity: Never     Review of Systems   Constitutional:  Negative for chills and fever.   Respiratory:  Negative for shortness of breath.    Cardiovascular:  Negative for chest pain.   Musculoskeletal:  Positive for myalgias.        Left leg rest pain   Neurological: Negative.  Negative for weakness and numbness.   Objective:     Vital Signs (Most Recent):  Temp: 97.9 °F (36.6 °C) (23 1735)  Pulse: 87 (23 2323)  Resp: 17 (23 2323)  BP: (!) 155/72 (23 2111)  SpO2: 96 % (23 2323)   Vital Signs (24h Range):  Temp:  [97.9 °F (36.6 °C)] 97.9 °F (36.6 °C)  Pulse:  [87-88] 87  Resp:  [15-19] 17  SpO2:  [96 %-100 %] 96 %  BP: (106-155)/(58-72) 155/72     Weight: 67 kg (147 lb 11.3 oz)  Body mass index is 25.35 kg/m².    Physical Exam  Constitutional:       Appearance: Normal appearance.   HENT:      Head: Normocephalic and atraumatic.   Cardiovascular:      Rate and Rhythm: Normal rate and regular rhythm.      Comments: Right: DP Triphasic signal, PT Biphasic signal  Left: DP monophasic signal, PT monophasic signal  Pulmonary:      Effort: Pulmonary effort is normal.      Breath sounds: Normal breath sounds.   Skin:     General: Skin is warm and dry.      Capillary Refill: Capillary refill takes 2 to 3 seconds.    Neurological:      Mental Status: She is alert and oriented to person, place, and time.       Significant Labs:  Recent Lab Results         03/29/23 2105 03/29/23 2008        aPTT 27.4  Comment: aPTT therapeutic range = 39-69 seconds         Baso #   0.03       Basophil %   0.3       CRP   38.0       Differential Method   Automated       Eos #   0.4       Eosinophil %   3.2       Gran # (ANC)   9.2       Gran %   76.9       Hematocrit   25.0       Hemoglobin   7.5       Hepatitis C Ab   Non-reactive       HIV 1/2 Ag/Ab   Non-reactive       Immature Grans (Abs)   0.05  Comment: Mild elevation in immature granulocytes is non specific and   can be seen in a variety of conditions including stress response,   acute inflammation, trauma and pregnancy. Correlation with other   laboratory and clinical findings is essential.         Immature Granulocytes   0.4       INR 1.2  Comment: Coumadin Therapy:  2.0 - 3.0 for INR for all indicators except mechanical heart valves  and antiphospholipid syndromes which should use 2.5 - 3.5.           Lactate, Jasmeet   1.8  Comment: Falsely low lactic acid results can be found in samples   containing >=13.0 mg/dL total bilirubin and/or >=3.5 mg/dL   direct bilirubin.         Lymph #   1.8       Lymph %   15.3       MCH   24.9       MCHC   30.0       MCV   83       Mono #   0.5       Mono %   3.9       MPV   10.6       nRBC   0       Platelets   525       Protime 12.5         RBC   3.01       RDW   16.1       Sed Rate   69       WBC   11.94               Significant Diagnostics:  CT: No results found in the last 24 hours.  I have reviewed all pertinent imaging results/findings within the past 24 hours.    Assessment and Plan:     PVD (peripheral vascular disease)  Nelsy Marino is a 82 y.o. woman with history of HTN, T2DM, DVT, and PAD who underwent left to right fem to SFA bypass and right SFA angioplasty on 3/2/23 and was admitted until 3/20. She was seen by Dr. Dunbar in the  office earlier today complaining of left rest pain and also found to have dehiscence of her left groin incision.      Admit to vascular surgery  Heparin gtt  vanc zosyn  Will need debridement of left groin wound  Will need revascularization of short segment occlusion left CFA  NPO  Insulin SS  Home meds  Asa plavix statin  Podiatry consult to eval right TMA wound  ID for chronic graft infection and new possibly infected graft        CINDY WHITAKER MD  Vascular Surgery  [facility]

## 2023-03-30 NOTE — PROGRESS NOTES
Pharmacokinetic Initial Assessment & Plan: IV Vancomycin    IV Vancomycin 1500 mg x once given in the ED on 03/29 @ 2300  Then Vancomycin 1000 mg every 24 hours, next dose to be given at 1200 on 03/30  Obtain a Vancomycin trough 60 mins prior to the 3rd dose on 03/31 @ 1100  Desired empiric serum trough concentration is 10 to 20 mcg/mL    Pharmacy will continue to follow and monitor [email or web address] [id number] with any questions regarding this assessment.     Thank you for the consult,   Fredy Sanchez       Patient brief summary:  Nelsy Marino is a 82 y.o. female initiated on antimicrobial therapy with IV Vancomycin for treatment of suspected left groin wound dehiscence     Drug Allergies:   Review of patient's allergies indicates:   Allergen Reactions    Motrin [ibuprofen] Itching       Actual Body Weight:   67 Kg    Renal Function:   Estimated Creatinine Clearance: 34 mL/min (based on SCr of 1.2 mg/dL).,     Dialysis Method (if applicable):  N/A    CBC (last 72 hours):  Recent Labs   Lab Result Units 03/29/23 2008   WBC K/uL 11.94   Hemoglobin g/dL 7.5*   Hematocrit % 25.0*   Platelets K/uL 525*   Gran % % 76.9*   Lymph % % 15.3*   Mono % % 3.9*   Eosinophil % % 3.2   Basophil % % 0.3   Differential Method  Automated       Metabolic Panel (last 72 hours):  Recent Labs   Lab Result Units 03/29/23  2322   Sodium mmol/L 139   Potassium mmol/L 4.8   Chloride mmol/L 101   CO2 mmol/L 24   Glucose mg/dL 190*   BUN mg/dL 46*   Creatinine mg/dL 1.2   Albumin g/dL 2.4*   Total Bilirubin mg/dL 0.5   Alkaline Phosphatase U/L 64   AST U/L 20   ALT U/L 8*       Drug levels (last 3 results):  No results for input(s): VANCOMYCINRA, VANCORANDOM, VANCOMYCINPE, VANCOPEAK, VANCOMYCINTR, VANCOTROUGH in the last 72 hours.    Microbiologic Results:  Microbiology Results (last 7 days)       Procedure Component Value Units Date/Time    Blood culture #1 **CANNOT BE ORDERED STAT** [[id number]] Collected: 03/29/23 2009    Order Status: Sent  Specimen: Blood from Peripheral, Antecubital, Left Updated: 03/29/23 2115    Blood culture #2 **CANNOT BE ORDERED STAT** [[id number]] Collected: 03/29/23 2009    Order Status: Sent Specimen: Blood from Peripheral, Antecubital, Right Updated: 03/29/23 2115    Blood culture [[id number]]     Order Status: Canceled Specimen: Blood     Blood culture [[id number]]     Order Status: Canceled Specimen: Blood

## 2023-03-30 NOTE — SUBJECTIVE & OBJECTIVE
Medications:  Continuous Infusions:   heparin (porcine) in D5W 14 Units/kg/hr (03/30/23 1006)     Scheduled Meds:   amLODIPine  10 mg Oral Daily    aspirin  81 mg Oral Daily    atorvastatin  40 mg Oral QHS    carvediloL  12.5 mg Oral BID    clopidogreL  75 mg Oral Daily    DULoxetine  30 mg Oral BID    furosemide  20 mg Oral Daily    pantoprazole  40 mg Oral BID    piperacillin-tazobactam (ZOSYN) IVPB  4.5 g Intravenous Q8H    vancomycin (VANCOCIN) IVPB  15 mg/kg Intravenous Q24H     PRN Meds:sodium chloride, dextrose 10%, glucagon (human recombinant), heparin (PORCINE), heparin (PORCINE), insulin aspart U-100, ondansetron, oxyCODONE, sodium chloride 0.9%     Objective:     Vital Signs (Most Recent):  Temp: 96.1 °F (35.6 °C) (03/30/23 1002)  Pulse: 89 (03/30/23 1002)  Resp: 16 (03/30/23 1002)  BP: (!) 143/62 (03/30/23 1033)  SpO2: (!) 94 % (03/30/23 1002)   Vital Signs (24h Range):  Temp:  [96.1 °F (35.6 °C)-98.7 °F (37.1 °C)] 96.1 °F (35.6 °C)  Pulse:  [75-90] 89  Resp:  [13-20] 16  SpO2:  [91 %-100 %] 94 %  BP: (106-155)/(58-72) 143/62         Physical Exam  Constitutional:       General: She is not in acute distress.     Appearance: She is not ill-appearing.   Cardiovascular:      Rate and Rhythm: Normal rate.      Comments: Right: DP Triphasic signal, PT Biphasic signal  Left: DP monophasic signal, PT monophasic signal  Pulmonary:      Effort: Pulmonary effort is normal. No respiratory distress.   Skin:     Comments: Left groin incision with nylon sutures. Dehisced with fibrinous material    Neurological:      Mental Status: She is alert.       Significant Labs:  CBC:   Recent Labs   Lab 03/30/23  0342   WBC 9.55   RBC 2.63*   HGB 6.3*   HCT 22.0*      MCV 84   MCH 24.0*   MCHC 28.6*     CMP:   Recent Labs   Lab 03/29/23  2322   *   CALCIUM 9.1   ALBUMIN 2.4*   PROT 7.5      K 4.8   CO2 24      BUN 46*   CREATININE 1.2   ALKPHOS 64   ALT 8*   AST 20   BILITOT 0.5       Significant  Diagnostics:  I have reviewed all pertinent imaging results/findings within the past 24 hours.

## 2023-03-30 NOTE — SUBJECTIVE & OBJECTIVE
Past Medical History:   Diagnosis Date    CHF (congestive heart failure)     Diabetes mellitus     DVT (deep venous thrombosis)     Hypertension     Miscarriage        Past Surgical History:   Procedure Laterality Date     femoral vascular surgery       ANGIOGRAPHY OF LOWER EXTREMITY Right 2/23/2023    Procedure: Angiogram Extremity Unilateral;  Surgeon: Danny Dunbar MD;  Location: [facility];  Service: Vascular;  Laterality: Right;  RN PHONE PREOP 2/20/2023----CK CONSENT    CREATION OF FEMORAL-FEMORAL ARTERY BYPASS WITH GRAFT Bilateral 3/2/2023    Procedure: CREATION, BYPASS, ARTERIAL, FEMORAL TO FEMORAL, USING GRAFT, LEFT FEMORAL ENDARTERECTOMY, LEFT ILIAC ARTERY STENT PLACEMENT, RIGHT LOWER EXTREMITY ANGIOGRAM AND RIGHT SFA  ANGIOPLASTY;  Surgeon: Danny Dunbar MD;  [facility];  Service: Vascular;  Laterality: Bilateral;   744.47 MGY  175.36 Gycm  Flouro time 20.5 mins      CREATION OF ILIOFEMORAL ARTERY BYPASS Right 3/20/2019    Procedure: CREATION, BYPASS, ARTERIAL, ILIAC TO FEMORAL, RIGHT LOWER EXTREMITY, RIGHT PROFUNDAPLASTY;  Surgeon: Danny Dunbar MD;  Location: [facility];  Service: Vascular;  Laterality: Right;  11:00AM START PER ANEN  RN PREOP 3/13/2019  ACT'S, CELL SAVER, GRAFTS, BOOK WATER---NEED H/P  CONSENT IN AM------HGBA1C    ESOPHAGOGASTRODUODENOSCOPY N/A 3/17/2023    Procedure: EGD (ESOPHAGOGASTRODUODENOSCOPY);  Surgeon: Gee Rodriguez MD;  Location: [facility] ENDO (2ND FLR);  Service: Endoscopy;  Laterality: N/A;    FOOT AMPUTATION THROUGH METATARSAL Right 3/4/2023    Procedure: AMPUTATION, FOOT, TRANSMETATARSAL;  Surgeon: Benjamin Godfrey DPM;  [facility];  Service: Podiatry;  Laterality: Right;    HYSTERECTOMY      ?unsure cervix present    INCISION AND DRAINAGE Right 4/12/2019    Procedure: Incision and Drainage - R groin washout, possible muscle flap;  Surgeon: Danny Dunbar MD;  [facility];  Service: Vascular;   "Laterality: Right;  groin washout    INCISION AND DRAINAGE OF GROIN Right 5/3/2019    Procedure: Incision and Drainage R groin;  Surgeon: Danny Dunbar MD;  Location: [facility] OR [address] [place] [address];  Service: Vascular;  Laterality: Right;       Review of patient's allergies indicates:   Allergen Reactions    Motrin [ibuprofen] Itching       Medications:  Medications Prior to Admission   Medication Sig    amlodipine (NORVASC) 10 MG tablet Take 10 mg by mouth once daily.    apixaban (ELIQUIS) 5 mg Tab Take 1 tablet (5 mg total) by mouth 2 (two) times daily.    aspirin (ECOTRIN) 81 MG EC tablet Take 81 mg by mouth once daily.    atorvastatin (LIPITOR) 40 MG tablet Take 1 tablet (40 mg total) by mouth every evening.    benzonatate (TESSALON) 100 MG capsule Take 1 capsule (100 mg total) by mouth 3 (three) times daily as needed for Cough.    BLOOD SUGAR DIAGNOSTIC (TRUE METRIX GLUCOSE TEST STRIP MISC) by Misc.(Non-Drug; Combo Route) route.    carvediloL (COREG) 12.5 MG tablet Take 12.5 mg by mouth 2 (two) times daily.    cephALEXin (KEFLEX) 500 MG capsule Take 2 capsules (1,000 mg total) by mouth every 12 (twelve) hours.    clopidogreL (PLAVIX) 75 mg tablet Take 1 tablet (75 mg total) by mouth once daily.    DULoxetine (CYMBALTA) 30 MG capsule Take 1 capsule (30 mg total) by mouth 2 (two) times daily.    furosemide (LASIX) 20 MG tablet Take 1 tablet (20 mg total) by mouth once daily.    insulin aspart protamine-insulin aspart (NOVOLOG MIX 70-30FLEXPEN U-100) 100 unit/mL (70-30) InPn pen Inject 12 Units into the skin 2 (two) times daily before meals.    insulin syringe-needle,dispos. 0.3 mL 30 gauge x 5/16" Syrg by Misc.(Non-Drug; Combo Route) route.    losartan-hydrochlorothiazide 100-25 mg (HYZAAR) 100-25 mg per tablet Take 1 tablet by mouth once daily.    metformin (GLUCOPHAGE) 1000 MG tablet Take 1,000 mg by mouth 2 (two) times daily with meals.    ondansetron (ZOFRAN) 8 MG tablet     pantoprazole (PROTONIX) 40 MG tablet Take " "1 tablet (40 mg total) by mouth 2 (two) times daily.    pen needle, diabetic 32 gauge x 5/32" Ndle Inject 1 each into the skin 4 (four) times daily.     Antibiotics (From admission, onward)      Start     Stop Route Frequency Ordered    23 1555  vancomycin - pharmacy to dose  (vancomycin IVPB)        See Mark for full Linked Orders Report.    -- IV pharmacy to manage frequency 23 1455    23 0315  piperacillin-tazobactam (ZOSYN) 4.5 g in dextrose 5 % in water (D5W) 5 % 100 mL IVPB (MB+)         -- IV Every 8 hours (non-standard times) 23 2345          Antifungals (From admission, onward)      None          Antivirals (From admission, onward)      None               There is no immunization history on file for this patient.    Family History       Problem Relation (Age of Onset)    Diabetes Father    Hypertension Father          Social History     Socioeconomic History    Marital status:    Tobacco Use    Smoking status: Former     Types: Cigarettes     Quit date:      Years since quittin.2    Smokeless tobacco: Never   Substance and Sexual Activity    Alcohol use: No    Drug use: No    Sexual activity: Never     Social Determinants of Health     Financial Resource Strain: Unknown    Difficulty of Paying Living Expenses: Patient refused   Food Insecurity: Unknown    Worried About Running Out of Food in the Last Year: Patient refused    Ran Out of Food in the Last Year: Patient refused   Transportation Needs: Unknown    Lack of Transportation (Non-Medical): Patient refused   Physical Activity: Unknown    Days of Exercise per Week: Patient refused    Minutes of Exercise per Session: Patient refused   Stress: Unknown    Feeling of Stress : Patient refused   Social Connections: Unknown    Frequency of Communication with Friends and Family: Patient refused    Frequency of Social Gatherings with Friends and Family: Patient refused    Attends Evangelical Services: Patient refused "    Active Member of Clubs or Organizations: Patient refused    Attends Club or Organization Meetings: Patient refused    Marital Status: Patient refused   Housing Stability: Unknown    Unable to Pay for Housing in the Last Year: Patient refused    Unstable Housing in the Last Year: Patient refused     Review of Systems   Constitutional:  Negative for chills, diaphoresis and fever.   Respiratory:  Negative for cough and shortness of breath.    Cardiovascular:  Negative for chest pain and leg swelling.   Gastrointestinal:  Negative for abdominal pain, diarrhea, nausea and vomiting.   Genitourinary: Negative.    Musculoskeletal:  Positive for gait problem.   Skin:  Positive for color change and wound.   Psychiatric/Behavioral:  Negative for agitation, confusion and decreased concentration. The patient is not nervous/anxious.    Objective:     Vital Signs (Most Recent):  Temp: 96.1 °F (35.6 °C) (03/30/23 1002)  Pulse: 89 (03/30/23 1002)  Resp: 16 (03/30/23 1002)  BP: (!) 143/62 (03/30/23 1033)  SpO2: (!) 94 % (03/30/23 1002)   Vital Signs (24h Range):  Temp:  [96.1 °F (35.6 °C)-98.7 °F (37.1 °C)] 96.1 °F (35.6 °C)  Pulse:  [75-90] 89  Resp:  [13-20] 16  SpO2:  [91 %-100 %] 94 %  BP: (106-155)/(58-72) 143/62     Weight: 67 kg (147 lb 11.3 oz)  Body mass index is 25.35 kg/m².    Estimated Creatinine Clearance: 34 mL/min (based on SCr of 1.2 mg/dL).    Physical Exam  Vitals reviewed.   Constitutional:       General: She is not in acute distress.     Appearance: She is not ill-appearing, toxic-appearing or diaphoretic.   HENT:      Head: Normocephalic and atraumatic.      Nose: Nose normal. No congestion.   Eyes:      General: No scleral icterus.     Conjunctiva/sclera: Conjunctivae normal.   Cardiovascular:      Rate and Rhythm: Normal rate and regular rhythm.   Pulmonary:      Effort: Pulmonary effort is normal. No respiratory distress.   Abdominal:      General: There is no distension.      Palpations: Abdomen is soft.       Tenderness: There is no abdominal tenderness.   Musculoskeletal:         General: Deformity (TMA site necrotic) present. No tenderness.      Right lower leg: No edema.      Left lower leg: No edema.   Skin:     General: Skin is warm and dry.      Findings: No rash.      Comments: Left groin incision with sutures in place. Wound dehisced with fibrinous exudate/slough overlying wound bed. No purulence. No malodor. See photo   Neurological:      Mental Status: She is alert and oriented to person, place, and time.   Psychiatric:         Mood and Affect: Mood normal.         Behavior: Behavior normal.         Thought Content: Thought content normal.         Judgment: Judgment normal.                         Significant Labs: CBC:   Recent Labs   Lab 03/29/23 2008 03/30/23  0342   WBC 11.94 9.55   HGB 7.5* 6.3*   HCT 25.0* 22.0*   * 418     CMP:   Recent Labs   Lab 03/29/23  2322      K 4.8      CO2 24   *   BUN 46*   CREATININE 1.2   CALCIUM 9.1   PROT 7.5   ALBUMIN 2.4*   BILITOT 0.5   ALKPHOS 64   AST 20   ALT 8*   ANIONGAP 14     Microbiology Results (last 7 days)       Procedure Component Value Units Date/Time    Blood culture #1 **CANNOT BE ORDERED STAT** [[id number]] Collected: 03/29/23 2009    Order Status: Completed Specimen: Blood from Peripheral, Antecubital, Left Updated: 03/30/23 0515     Blood Culture, Routine No Growth to date    Blood culture #2 **CANNOT BE ORDERED STAT** [[id number]] Collected: 03/29/23 2009    Order Status: Completed Specimen: Blood from Peripheral, Antecubital, Right Updated: 03/30/23 0515     Blood Culture, Routine No Growth to date    Blood culture [[id number]]     Order Status: Canceled Specimen: Blood     Blood culture [[id number]]     Order Status: Canceled Specimen: Blood           Urine Culture: No results for input(s): LABURIN in the last 4320 hours.  Urine Studies:   Recent Labs   Lab 03/09/23  1427   COLORU Yellow   APPEARANCEUA Clear   PHUR 5.0    SPECGRAV 1.015   PROTEINUA Negative   GLUCUA Negative   KETONESU Negative   BILIRUBINUA Negative   OCCULTUA Negative   NITRITE Negative   LEUKOCYTESUR Negative     Wound Culture:   Recent Labs   Lab 03/04/23  1141 03/10/23  1340   LABAERO No growth [facility]  Few  *     Recent Lab Results  (Last 5 results in the past 24 hours)        03/30/23  1045   03/30/23  0515   03/30/23  0342   03/30/23  0011   03/30/23  0010        aPTT   138.5  Comment: aPTT therapeutic range = 39-69 seconds             Baso #     0.03           Basophil %     0.3           Differential Method     Automated           Eos #     0.4           Eosinophil %     3.7           Gran # (ANC)     7.0           Gran %     73.3           Group & Rh B POS               Hematocrit     22.0           Hemoglobin     6.3           Immature Grans (Abs)     0.03  Comment: Mild elevation in immature granulocytes is non specific and   can be seen in a variety of conditions including stress response,   acute inflammation, trauma and pregnancy. Correlation with other   laboratory and clinical findings is essential.             Immature Granulocytes     0.3           INDIRECT JUAN JOSÉ POS               Lymph #     1.7           Lymph %     17.8           Magnesium     2.1           MCH     24.0           MCHC     28.6           MCV     84           Mono #     0.4           Mono %     4.6           MPV     10.5           nRBC     0           Phosphorus     3.8           Platelets     418           POC Glucose       237         Prealbumin         16       RBC     2.63           RDW     16.2           Specimen Outdate 04/02/2023 23:59               WBC     9.55                                  Significant Imaging:     Imaging Results              CTA Runoff ABD PEL Bilat Lower Ext (Final result)  Result time 03/30/23 00:23:56      Final result by Mumtaz Fall MD (03/30/23 00:23:56)                   Impression:      Extensive calcific  atherosclerosis grossly patent bilateral three-vessel runoff.    Interval placement of new femoral-femoral bypass graft placement.  Old femoral-femoral bypass graft remains occluded.  Short segment occlusion versus severe stenosis of the proximal right femoral artery.  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  Significant stenosis of the left femoral artery beyond the anastomosis with distal reconstitution.    Moderate soft tissue edema with trace fluid collection in the left groin potentially related to recent surgery.  Sterility of this collection cannot be confirmed with CT.    Bibasilar ground-glass attenuation with interlobular septal thickening which could be related to chronic interstitial lung disease or low-grade infectious/inflammatory process.    Additional findings as above.    Electronically signed by resident: Aide Varela  Date:    03/29/2023  Time:    23:01    Electronically signed by: Mumtaz Fall MD  Date:    03/30/2023  Time:    00:23               Narrative:    EXAMINATION:  CTA RUNOFF ABD PEL BILAT LOWER EXT    CLINICAL HISTORY:  Claudication or leg ischemia;    TECHNIQUE:  CTA of abdomen, pelvis, and bilateral lower extremities performed with 125 mL Omnipaque 350 intravenous contrast.    COMPARISON:  Ultrasound lower extremity veins 03/07/2023, CT chest abdomen pelvis 03/10/2023, CTA runoff 02/13/2023.    FINDINGS:  Vascular:    Aorta: Moderate calcific atherosclerosis of the aorta. Aorta is normal caliber with no dissection, or aneurysm.  Severe calcific atherosclerosis of the iliac arteries down to the extremities bilaterally.    Redemonstration of complete occlusion of the old femoral-femoral bypass.  There is new lower abdominal wall postoperative changes of femoral bypass graft creation with appropriate opacification through the graft with no evidence of occlusion.  There is extensive soft tissue stranding and trace of fluid around the left femoral artery (axial series  image 623).  No organized fluid collection.    Right:    Common iliac artery: Occluded    External iliac artery: Occluded    Common femoral artery: Occluded    Femoral artery: Stable probable pseudoaneurysm in the proximal femoral artery.  Occlusion or severe stenosis of the right SFA with distal reconstitution.  Stable occluded by femoral bypass graft.  New bi femoral bypass graft is grossly patent.  Reconstitution of the right SFA beyond the bypass graft.    Popliteal artery: Patent    Anterior tibial artery: Grossly patent with mild intermittent stenosis.    Posterior tibial artery: Grossly patent with mild intermittent stenosis.    Peroneal artery: Grossly patent with mild intermittent stenosis.    Left:    Common iliac artery: Patent    External iliac artery: Patent    Common femoral artery: Patent    Femoral artery: Femoral graft placement.  Severe stenosis of the femoral artery distal to the anastomosis with distal reconstitution.  Multifocal intermittent stenosis throughout the left femoral artery.    Popliteal artery: Patent    Anterior tibial artery: Patent    Posterior tibial artery: Patent    Peroneal artery: Patent with multifocal intermittent stenosis.    Nonvascular:    Heart: No cardiomegaly.  Trace of pericardial effusion. Multiple coronaries calcific atherosclerosis.    Lung Bases: Bibasal ground-glass opacities and interlobular wall thickening we reticulations suggestive underlining small airway disease with subpleural scarring.  No pleural effusion.    Liver: Normal in size and attenuation without focal hepatic abnormality.    Gallbladder: Cholelithiasis with no gallbladder wall thickening or pericholecystic fluid collection.    Bile Ducts: No intra or extrahepatic biliary ductal dilation.    Pancreas: No pancreatic mass lesion or peripancreatic inflammatory change.    Spleen: Normal.    Adrenals: Normal.    Genitourinary: Normal in size and location.Normal enhancement.  No focal renal  abnormality, nephrolithiasis, or hydroureteronephrosis.  1.8 cm left renal cyst.  Bladder demonstrates smooth contours without bladder wall thickening.    Reproductive organs: Normal.    GI tract/Mesentery: Stomach is normal appearance. Visualized loops of small and large bowel are normal in caliber without evidence for inflammation or obstruction.  Appendix is unremarkable    Peritoneal Space: No abdominopelvic ascites or intraperitoneal free air.    Retroperitoneum: No significant adenopathy.    Bones: Multilevel degenerative changes.  No acute fracture.

## 2023-03-30 NOTE — ASSESSMENT & PLAN NOTE
Patient with history of left to right fem to SFA bypass and right SFA angioplasty on 3/2/23. She has developed dehiscence of her left groin decision.  CTA showed soft tissue stranding and trace fluid around the left femoral artery. No organized fluid collection. She is planning to go to the OR with vascular surgery for debridement of wound. ID consulted for antibiotic recommendations.     Recommendations  · Continue Vancomycin. Deescalate Zosyn to Cefepime  · When taken to the OR, please send tissue for gram stain, aerobic, anaerobic, AFB and fungal cultures   · Podiatry consulted for TMA wound management  · Will follow cultures to guide antibiotic therapy  · Discussed antibiotic plan with ID staff. ID will follow.

## 2023-03-30 NOTE — ED NOTES
Pt had small bowel movement.  Perineal care performed.  Clean brief applied.  Repositioned for comfort.  Sister remains at bedside.

## 2023-03-30 NOTE — CONSULTS
[facility]  Infectious Disease  Consult Note    Patient Name: Nelsy Marino  MRN: [id number]  Admission Date: 3/29/2023  Hospital Length of Stay: 1 days  Attending Physician: Danny Dunbar MD  Primary Care Provider: [facility]     Isolation Status: No active isolations    Patient information was obtained from patient and past medical records.      Inpatient consult to Infectious Diseases  Consult performed by: Liza Barrera PA-C  Consult ordered by: Dusty Marcus MD        Assessment/Plan:     Orthopedic  Wound dehiscence  Patient with history of left to right fem to SFA bypass and right SFA angioplasty on 3/2/23. She has developed dehiscence of her left groin decision.  CTA showed soft tissue stranding and trace fluid around the left femoral artery. No organized fluid collection. She is planning to go to the OR with vascular surgery for debridement of wound. ID consulted for antibiotic recommendations.     Recommendations  Will change Vancomycin to Daptomycin given advanced age and low CrCl. CPK ordered.  Deescalate Zosyn to Cefepime  When taken to the OR, please send tissue for gram stain, aerobic, anaerobic, AFB and fungal cultures   Podiatry consulted for TMA wound management  Will follow cultures to guide antibiotic therapy  Discussed antibiotic plan with ID staff. ID will follow.        Thank you for the consult. Please secure chat for any questions.  Liza Barrera PA-C      Subjective:     Principal Problem: <principal problem not specified>    HPI: 82 year old female with history of right iliofemoral bypass 3/2019 c/b wound dehiscence requiring washout 4/2019 and right groin infection following a vascular bypass surgery on lifelong cephalexin suppression (indication: vascular graft retained + proximity of abscess to graft).     Patient was admitted earlier this month with right foot gangrene. She underwent left external iliac to R SFA bypass with PTFE, left common +  ext iliac artery stenting, R SFA angioplasty with drug coated balloon on 3/02. She then underwent right TMA 3/04 (surgical cx of clean bone margin neg, path neg). During her stay she did develop wound dehiscence at TMA site. Wound cultures grew pseudomonas fluorescens and she completed a course of ciprofloxacin with plan was to resume cephalexin for chronic suppression of prior graft infection after completing ciprofloxacin.    Patient was discharged home on 3/20. While at home, her  noticed her left groin wound had dehisced. Denies purulent drainage. Per patient, she is mainly bed bound. Denies fevers, chills, sweats. Denies foul odor, diarrhea, urinary contamination. Her TMA site is necrotic. Non tender. Patient followed up with vascular surgery yesterday who recommended admission.      CTA showed redemonstration of complete occlusion of the old femoral-femoral bypass. There is new lower abdominal wall postoperative changes of femoral bypass graft creation with appropriate opacification through the graft with no evidence of occlusion. There is extensive soft tissue stranding and trace of fluid around the left femoral artery. No organized fluid collection.     Primary team started Vanc/Zosyn. ID consulted for antibiotic recommendations.            Past Medical History:   Diagnosis Date    CHF (congestive heart failure)     Diabetes mellitus     DVT (deep venous thrombosis)     Hypertension     Miscarriage        Past Surgical History:   Procedure Laterality Date     femoral vascular surgery       ANGIOGRAPHY OF LOWER EXTREMITY Right 2/23/2023    Procedure: Angiogram Extremity Unilateral;  Surgeon: Danny Dunbar MD;  Location: [facility];  Service: Vascular;  Laterality: Right;  RN PHONE PREOP 2/20/2023----CK CONSENT    CREATION OF FEMORAL-FEMORAL ARTERY BYPASS WITH GRAFT Bilateral 3/2/2023    Procedure: CREATION, BYPASS, ARTERIAL, FEMORAL TO FEMORAL, USING GRAFT, LEFT FEMORAL ENDARTERECTOMY, LEFT ILIAC  ARTERY STENT PLACEMENT, RIGHT LOWER EXTREMITY ANGIOGRAM AND RIGHT SFA  ANGIOPLASTY;  Surgeon: Danny Dunbar MD;  [address];  Service: Vascular;  Laterality: Bilateral;   744.47 MGY  175.36 Gycm  Flouro time 20.5 mins      CREATION OF ILIOFEMORAL ARTERY BYPASS Right 3/20/2019    Procedure: CREATION, BYPASS, ARTERIAL, ILIAC TO FEMORAL, RIGHT LOWER EXTREMITY, RIGHT PROFUNDAPLASTY;  Surgeon: Danny Dunbar MD;  Location: [facility] OR;  Service: Vascular;  Laterality: Right;  11:00AM START PER ANNE RICHARDS PREOP 3/13/2019  ACT'S, CELL SAVER, GRAFTS, BOOK WATER---NEED H/P  CONSENT IN AM------HGBA1C    ESOPHAGOGASTRODUODENOSCOPY N/A 3/17/2023    Procedure: EGD (ESOPHAGOGASTRODUODENOSCOPY);  Surgeon: Gee Rodriguez MD;  [facility]  Service: Endoscopy;  Laterality: N/A;    FOOT AMPUTATION THROUGH METATARSAL Right 3/4/2023    Procedure: AMPUTATION, FOOT, TRANSMETATARSAL;  Surgeon: Benjamin Godfrey DPM;  Location: [facility] OR [address] [place] [address];  Service: Podiatry;  Laterality: Right;    HYSTERECTOMY      ?unsure cervix present    INCISION AND DRAINAGE Right 4/12/2019    Procedure: Incision and Drainage - R groin washout, possible muscle flap;  Surgeon: Danny Dunbar MD;  Location: [address];  Service: Vascular;  Laterality: Right;  groin washout    INCISION AND DRAINAGE OF GROIN Right 5/3/2019    Procedure: Incision and Drainage R groin;  Surgeon: Danny Dunbar MD;  [facility];  Service: Vascular;  Laterality: Right;       Review of patient's allergies indicates:   Allergen Reactions    Motrin [ibuprofen] Itching       Medications:  Medications Prior to Admission   Medication Sig    amlodipine (NORVASC) 10 MG tablet Take 10 mg by mouth once daily.    apixaban (ELIQUIS) 5 mg Tab Take 1 tablet (5 mg total) by mouth 2 (two) times daily.    aspirin (ECOTRIN) 81 MG EC tablet Take 81 mg by mouth once daily.    atorvastatin (LIPITOR) 40 MG tablet Take 1 tablet (40  "mg total) by mouth every evening.    benzonatate (TESSALON) 100 MG capsule Take 1 capsule (100 mg total) by mouth 3 (three) times daily as needed for Cough.    BLOOD SUGAR DIAGNOSTIC (TRUE METRIX GLUCOSE TEST STRIP MISC) by Misc.(Non-Drug; Combo Route) route.    carvediloL (COREG) 12.5 MG tablet Take 12.5 mg by mouth 2 (two) times daily.    cephALEXin (KEFLEX) 500 MG capsule Take 2 capsules (1,000 mg total) by mouth every 12 (twelve) hours.    clopidogreL (PLAVIX) 75 mg tablet Take 1 tablet (75 mg total) by mouth once daily.    DULoxetine (CYMBALTA) 30 MG capsule Take 1 capsule (30 mg total) by mouth 2 (two) times daily.    furosemide (LASIX) 20 MG tablet Take 1 tablet (20 mg total) by mouth once daily.    insulin aspart protamine-insulin aspart (NOVOLOG MIX 70-30FLEXPEN U-100) 100 unit/mL (70-30) InPn pen Inject 12 Units into the skin 2 (two) times daily before meals.    insulin syringe-needle,dispos. 0.3 mL 30 gauge x 5/16" Syrg by Misc.(Non-Drug; Combo Route) route.    losartan-hydrochlorothiazide 100-25 mg (HYZAAR) 100-25 mg per tablet Take 1 tablet by mouth once daily.    metformin (GLUCOPHAGE) 1000 MG tablet Take 1,000 mg by mouth 2 (two) times daily with meals.    ondansetron (ZOFRAN) 8 MG tablet     pantoprazole (PROTONIX) 40 MG tablet Take 1 tablet (40 mg total) by mouth 2 (two) times daily.    pen needle, diabetic 32 gauge x 5/32" Ndle Inject 1 each into the skin 4 (four) times daily.     Antibiotics (From admission, onward)      Start     Stop Route Frequency Ordered    03/30/23 1555  vancomycin - pharmacy to dose  (vancomycin IVPB)        See Hyperspace for full Linked Orders Report.    -- IV pharmacy to manage frequency 03/30/23 1455    03/30/23 0315  piperacillin-tazobactam (ZOSYN) 4.5 g in dextrose 5 % in water (D5W) 5 % 100 mL IVPB (MB+)         -- IV Every 8 hours (non-standard times) 03/29/23 2345          Antifungals (From admission, onward)      None          Antivirals (From admission, onward) "      None               There is no immunization history on file for this patient.    Family History       Problem Relation (Age of Onset)    Diabetes Father    Hypertension Father          Social History     Socioeconomic History    Marital status:    Tobacco Use    Smoking status: Former     Types: Cigarettes     Quit date:      Years since quittin.2    Smokeless tobacco: Never   Substance and Sexual Activity    Alcohol use: No    Drug use: No    Sexual activity: Never     Social Determinants of Health     Financial Resource Strain: Unknown    Difficulty of Paying Living Expenses: Patient refused   Food Insecurity: Unknown    Worried About Running Out of Food in the Last Year: Patient refused    Ran Out of Food in the Last Year: Patient refused   Transportation Needs: Unknown    Lack of Transportation (Non-Medical): Patient refused   Physical Activity: Unknown    Days of Exercise per Week: Patient refused    Minutes of Exercise per Session: Patient refused   Stress: Unknown    Feeling of Stress : Patient refused   Social Connections: Unknown    Frequency of Communication with Friends and Family: Patient refused    Frequency of Social Gatherings with Friends and Family: Patient refused    Attends Jewish Services: Patient refused    Active Member of Clubs or Organizations: Patient refused    Attends Club or Organization Meetings: Patient refused    Marital Status: Patient refused   Housing Stability: Unknown    Unable to Pay for Housing in the Last Year: Patient refused    Unstable Housing in the Last Year: Patient refused     Review of Systems   Constitutional:  Negative for chills, diaphoresis and fever.   Respiratory:  Negative for cough and shortness of breath.    Cardiovascular:  Negative for chest pain and leg swelling.   Gastrointestinal:  Negative for abdominal pain, diarrhea, nausea and vomiting.   Genitourinary: Negative.    Musculoskeletal:  Positive for gait problem.   Skin:  Positive  for color change and wound.   Psychiatric/Behavioral:  Negative for agitation, confusion and decreased concentration. The patient is not nervous/anxious.    Objective:     Vital Signs (Most Recent):  Temp: 96.1 °F (35.6 °C) (03/30/23 1002)  Pulse: 89 (03/30/23 1002)  Resp: 16 (03/30/23 1002)  BP: (!) 143/62 (03/30/23 1033)  SpO2: (!) 94 % (03/30/23 1002)   Vital Signs (24h Range):  Temp:  [96.1 °F (35.6 °C)-98.7 °F (37.1 °C)] 96.1 °F (35.6 °C)  Pulse:  [75-90] 89  Resp:  [13-20] 16  SpO2:  [91 %-100 %] 94 %  BP: (106-155)/(58-72) 143/62     Weight: 67 kg (147 lb 11.3 oz)  Body mass index is 25.35 kg/m².    Estimated Creatinine Clearance: 34 mL/min (based on SCr of 1.2 mg/dL).    Physical Exam  Vitals reviewed.   Constitutional:       General: She is not in acute distress.     Appearance: She is not ill-appearing, toxic-appearing or diaphoretic.   HENT:      Head: Normocephalic and atraumatic.      Nose: Nose normal. No congestion.   Eyes:      General: No scleral icterus.     Conjunctiva/sclera: Conjunctivae normal.   Cardiovascular:      Rate and Rhythm: Normal rate and regular rhythm.   Pulmonary:      Effort: Pulmonary effort is normal. No respiratory distress.   Abdominal:      General: There is no distension.      Palpations: Abdomen is soft.      Tenderness: There is no abdominal tenderness.   Musculoskeletal:         General: Deformity (TMA site necrotic) present. No tenderness.      Right lower leg: No edema.      Left lower leg: No edema.   Skin:     General: Skin is warm and dry.      Findings: No rash.      Comments: Left groin incision with sutures in place. Wound dehisced with fibrinous exudate/slough overlying wound bed. No purulence. No malodor. See photo   Neurological:      Mental Status: She is alert and oriented to person, place, and time.   Psychiatric:         Mood and Affect: Mood normal.         Behavior: Behavior normal.         Thought Content: Thought content normal.         Judgment:  Judgment normal.                         Significant Labs: CBC:   Recent Labs   Lab 03/29/23 2008 03/30/23  0342   WBC 11.94 9.55   HGB 7.5* 6.3*   HCT 25.0* 22.0*   * 418     CMP:   Recent Labs   Lab 03/29/23  2322      K 4.8      CO2 24   *   BUN 46*   CREATININE 1.2   CALCIUM 9.1   PROT 7.5   ALBUMIN 2.4*   BILITOT 0.5   ALKPHOS 64   AST 20   ALT 8*   ANIONGAP 14     Microbiology Results (last 7 days)       Procedure Component Value Units Date/Time    Blood culture #1 **CANNOT BE ORDERED STAT** [[id number]] Collected: 03/29/23 2009    Order Status: Completed Specimen: Blood from Peripheral, Antecubital, Left Updated: 03/30/23 0515     Blood Culture, Routine No Growth to date    Blood culture #2 **CANNOT BE ORDERED STAT** [[id number]] Collected: 03/29/23 2009    Order Status: Completed Specimen: Blood from Peripheral, Antecubital, Right Updated: 03/30/23 0515     Blood Culture, Routine No Growth to date    Blood culture [[id number]]     Order Status: Canceled Specimen: Blood     Blood culture [[id number]]     Order Status: Canceled Specimen: Blood           Urine Culture: No results for input(s): LABURIN in the last 4320 hours.  Urine Studies:   Recent Labs   Lab 03/09/23  1427   COLORU Yellow   APPEARANCEUA Clear   PHUR 5.0   SPECGRAV 1.015   PROTEINUA Negative   GLUCUA Negative   KETONESU Negative   BILIRUBINUA Negative   OCCULTUA Negative   NITRITE Negative   LEUKOCYTESUR Negative     Wound Culture:   Recent Labs   Lab 03/04/23  1141 03/10/23  1340   LABAERO No growth [facility]  Few  *     Recent Lab Results  (Last 5 results in the past 24 hours)        03/30/23  1045   03/30/23  0515   03/30/23  0342   03/30/23  0011   03/30/23  0010        aPTT   138.5  Comment: aPTT therapeutic range = 39-69 seconds             Baso #     0.03           Basophil %     0.3           Differential Method     Automated           Eos #     0.4           Eosinophil %     3.7            Gran # (ANC)     7.0           Gran %     73.3           Group & Rh B POS               Hematocrit     22.0           Hemoglobin     6.3           Immature Grans (Abs)     0.03  Comment: Mild elevation in immature granulocytes is non specific and   can be seen in a variety of conditions including stress response,   acute inflammation, trauma and pregnancy. Correlation with other   laboratory and clinical findings is essential.             Immature Granulocytes     0.3           INDIRECT JUAN JOSÉ POS               Lymph #     1.7           Lymph %     17.8           Magnesium     2.1           MCH     24.0           MCHC     28.6           MCV     84           Mono #     0.4           Mono %     4.6           MPV     10.5           nRBC     0           Phosphorus     3.8           Platelets     418           POC Glucose       237         Prealbumin         16       RBC     2.63           RDW     16.2           Specimen Outdate 04/02/2023 23:59               WBC     9.55                                  Significant Imaging:     Imaging Results              CTA Runoff ABD PEL Bilat Lower Ext (Final result)  Result time 03/30/23 00:23:56      Final result by Mumtaz Fall MD (03/30/23 00:23:56)                   Impression:      Extensive calcific atherosclerosis grossly patent bilateral three-vessel runoff.    Interval placement of new femoral-femoral bypass graft placement.  Old femoral-femoral bypass graft remains occluded.  Short segment occlusion versus severe stenosis of the proximal right femoral artery.  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  Significant stenosis of the left femoral artery beyond the anastomosis with distal reconstitution.    Moderate soft tissue edema with trace fluid collection in the left groin potentially related to recent surgery.  Sterility of this collection cannot be confirmed with CT.    Bibasilar ground-glass attenuation with interlobular septal thickening which could  be related to chronic interstitial lung disease or low-grade infectious/inflammatory process.    Additional findings as above.    Electronically signed by resident: Aide Varela  Date:    03/29/2023  Time:    23:01    Electronically signed by: Mumtaz Fall MD  Date:    03/30/2023  Time:    00:23               Narrative:    EXAMINATION:  CTA RUNOFF ABD PEL BILAT LOWER EXT    CLINICAL HISTORY:  Claudication or leg ischemia;    TECHNIQUE:  CTA of abdomen, pelvis, and bilateral lower extremities performed with 125 mL Omnipaque 350 intravenous contrast.    COMPARISON:  Ultrasound lower extremity veins 03/07/2023, CT chest abdomen pelvis 03/10/2023, CTA runoff 02/13/2023.    FINDINGS:  Vascular:    Aorta: Moderate calcific atherosclerosis of the aorta. Aorta is normal caliber with no dissection, or aneurysm.  Severe calcific atherosclerosis of the iliac arteries down to the extremities bilaterally.    Redemonstration of complete occlusion of the old femoral-femoral bypass.  There is new lower abdominal wall postoperative changes of femoral bypass graft creation with appropriate opacification through the graft with no evidence of occlusion.  There is extensive soft tissue stranding and trace of fluid around the left femoral artery (axial series image 623).  No organized fluid collection.    Right:    Common iliac artery: Occluded    External iliac artery: Occluded    Common femoral artery: Occluded    Femoral artery: Stable probable pseudoaneurysm in the proximal femoral artery.  Occlusion or severe stenosis of the right SFA with distal reconstitution.  Stable occluded by femoral bypass graft.  New bi femoral bypass graft is grossly patent.  Reconstitution of the right SFA beyond the bypass graft.    Popliteal artery: Patent    Anterior tibial artery: Grossly patent with mild intermittent stenosis.    Posterior tibial artery: Grossly patent with mild intermittent stenosis.    Peroneal artery: Grossly patent with mild  intermittent stenosis.    Left:    Common iliac artery: Patent    External iliac artery: Patent    Common femoral artery: Patent    Femoral artery: Femoral graft placement.  Severe stenosis of the femoral artery distal to the anastomosis with distal reconstitution.  Multifocal intermittent stenosis throughout the left femoral artery.    Popliteal artery: Patent    Anterior tibial artery: Patent    Posterior tibial artery: Patent    Peroneal artery: Patent with multifocal intermittent stenosis.    Nonvascular:    Heart: No cardiomegaly.  Trace of pericardial effusion. Multiple coronaries calcific atherosclerosis.    Lung Bases: Bibasal ground-glass opacities and interlobular wall thickening we reticulations suggestive underlining small airway disease with subpleural scarring.  No pleural effusion.    Liver: Normal in size and attenuation without focal hepatic abnormality.    Gallbladder: Cholelithiasis with no gallbladder wall thickening or pericholecystic fluid collection.    Bile Ducts: No intra or extrahepatic biliary ductal dilation.    Pancreas: No pancreatic mass lesion or peripancreatic inflammatory change.    Spleen: Normal.    Adrenals: Normal.    Genitourinary: Normal in size and location.Normal enhancement.  No focal renal abnormality, nephrolithiasis, or hydroureteronephrosis.  1.8 cm left renal cyst.  Bladder demonstrates smooth contours without bladder wall thickening.    Reproductive organs: Normal.    GI tract/Mesentery: Stomach is normal appearance. Visualized loops of small and large bowel are normal in caliber without evidence for inflammation or obstruction.  Appendix is unremarkable    Peritoneal Space: No abdominopelvic ascites or intraperitoneal free air.    Retroperitoneum: No significant adenopathy.    Bones: Multilevel degenerative changes.  No acute fracture.

## 2023-03-30 NOTE — HPI
82 year old female with history of right iliofemoral bypass 3/2019 c/b wound dehiscence requiring washout 4/2019 and right groin infection following a vascular bypass surgery on lifelong cephalexin suppression (indication: vascular graft retained + proximity of abscess to graft).     Patient was admitted earlier this month with right foot gangrene. She underwent left external iliac to R SFA bypass with PTFE, left common + ext iliac artery stenting, R SFA angioplasty with drug coated balloon on 3/02. She then underwent right TMA 3/04 (surgical cx of clean bone margin neg, path neg). During her stay she did develop wound dehiscence at TMA site. Wound cultures grew pseudomonas fluorescens and she completed a course of ciprofloxacin with plan was to resume cephalexin for chronic suppression of prior graft infection after completing ciprofloxacin.    Patient was discharged home on 3/20. While at home, her  noticed her left groin wound had dehisced. Denies purulent drainage. Per patient, she is mainly bed bound. Denies fevers, chills, sweats. Denies foul odor, diarrhea, urinary contamination. Her TMA site is necrotic. Non tender. Patient followed up with vascular surgery yesterday who recommended admission.      CTA showed redemonstration of complete occlusion of the old femoral-femoral bypass. There is new lower abdominal wall postoperative changes of femoral bypass graft creation with appropriate opacification through the graft with no evidence of occlusion. There is extensive soft tissue stranding and trace of fluid around the left femoral artery. No organized fluid collection.     Primary team started Vanc/Zosyn. ID consulted for antibiotic recommendations.

## 2023-03-30 NOTE — ED TRIAGE NOTES
Nelsy Marino, a 82 y.o. female presents to the ED w/ complaint of leg pain. Hx of vascular surgery, fem-fem bypass. Worried about dehiscence at femoral site. Endorses increased pain of leg and chills.     Adult Physical Assessment  LOC: Nelsy Marino, 82 y.o. female verified via two identifiers.  The patient is awake, alert, oriented and speaking appropriately at this time.  APPEARANCE: Patient resting comfortably and appears to be in no acute distress at this time. Patient is clean and well groomed, patient's clothing is properly fastened.  SKIN:The skin is warm and dry, color consistent with ethnicity, patient has normal skin turgor and moist mucus membranes, skin intact, no breakdown or brusing noted.  MUSCULOSKELETAL: Patient moving all extremities well, no obvious swelling or deformities noted. Reports to ED for leg pain. Hx of vascular surgery, fem-fem bypass. Worried about dehiscence at femoral site. Endorses increased pain of leg and chills.   RESPIRATORY: Airway is open and patent, respirations are spontaneous, patient has a normal effort and rate, no accessory muscle use noted.  CARDIAC: Patient has a normal rate and rhythm, no periphreal edema noted in any extremity, capillary refill < 3 seconds in all extremities  ABDOMEN: Soft and non tender to palpation, no abdominal distention noted. Bowel sounds present in all four quadrants.  NEUROLOGIC: Eyes open spontaneously, behavior appropriate to situation, follows commands, facial expression symmetrical, bilateral hand grasp equal and even, purposeful motor response noted, normal sensation in all extremities when touched with a finger.      Triage note:  Chief Complaint   Patient presents with    admission     Review of patient's allergies indicates:   Allergen Reactions    Motrin [ibuprofen] Itching     Past Medical History:   Diagnosis Date    CHF (congestive heart failure)     Diabetes mellitus     DVT (deep venous thrombosis)     Hypertension      Miscarriage

## 2023-03-30 NOTE — PLAN OF CARE
"   03/30/23 1402   Readmission   Why were you hospitalized in the last 30 days? Yes   Why were you readmitted? Alarmed about signs/symptoms   When you left the hospital how did you feel? " I felt good to go home."   When you left the hospital where did you go? Home with Home Health   Did patient/caregiver refused recommended DC plan? No   Tell me about what happened between when you left the hospital and the day you returned. Pt began experincing worsening leg pain and was concerned. Presented to ED   When did you start not feeling well? 3/29/32   Did you try to manage your symptoms your self? Yes   Did you call anyone? Yes   Who did you call? Home Health Nurse   Did you try to see or did see a doctor or nurse before you came? Yes   Were you seen? Yes   Did you have  a follow-up appointment on discharge? Yes   Did you go? Yes   Was this a planned readmission? No       Spoke to pt. Pt lives at home with Katy [phone]. Post hospital  stay  will be pt support person and pt. has transportation at d/c with . There have been hospitalizations within the last 30 days per pt and pt . Verified pt PCP and preferred pharmacy. Pt stated not on Coumadin and is not receiving dialysis. All questions answered regarding case management/ discharge planning , pt verbalized understanding. Discharge booklet with SW contact information given to pt. Pt current with [facility].    Edwige Gan LCSW  Case Management/[facility]  [phone]     "

## 2023-03-30 NOTE — ED NOTES
Pt awake and alert; resting quietly in bed. Chest rise and fall observed.VS stable; . Pt denies pain at this time; no acute distress or discomfort reported or observed.  Pt denies restroom needs at this time; is able to reposition self in bed. Pt instructed to alert RN if they need to get out of bed.  Family at bedside.

## 2023-03-30 NOTE — ED NOTES
MD messaged regarding heparin orders.  Waiting on further orders.  Heparin infusion off since 6am.

## 2023-03-30 NOTE — SUBJECTIVE & OBJECTIVE
(Not in a hospital admission)      Review of patient's allergies indicates:   Allergen Reactions    Motrin [ibuprofen] Itching       Past Medical History:   Diagnosis Date    CHF (congestive heart failure)     Diabetes mellitus     DVT (deep venous thrombosis)     Hypertension     Miscarriage      Past Surgical History:   Procedure Laterality Date     femoral vascular surgery       ANGIOGRAPHY OF LOWER EXTREMITY Right 2/23/2023    Procedure: Angiogram Extremity Unilateral;  Surgeon: Danny Dunbar MD;  Location: [facility] OR;  Service: Vascular;  Laterality: Right;  RN PHONE PREOP 2/20/2023----CK CONSENT    CREATION OF FEMORAL-FEMORAL ARTERY BYPASS WITH GRAFT Bilateral 3/2/2023    Procedure: CREATION, BYPASS, ARTERIAL, FEMORAL TO FEMORAL, USING GRAFT, LEFT FEMORAL ENDARTERECTOMY, LEFT ILIAC ARTERY STENT PLACEMENT, RIGHT LOWER EXTREMITY ANGIOGRAM AND RIGHT SFA  ANGIOPLASTY;  Surgeon: Danny Dunbar MD;  Location: [facility] OR [address] [place] [address];  Service: Vascular;  Laterality: Bilateral;   744.47 MGY  175.36 Gycm  Flouro time 20.5 mins      CREATION OF ILIOFEMORAL ARTERY BYPASS Right 3/20/2019    Procedure: CREATION, BYPASS, ARTERIAL, ILIAC TO FEMORAL, RIGHT LOWER EXTREMITY, RIGHT PROFUNDAPLASTY;  Surgeon: Danny Dunbar MD;  Location: [facility] OR;  Service: Vascular;  Laterality: Right;  11:00AM START PER ANNE  RN PREOP 3/13/2019  ACT'S, CELL SAVER, GRAFTS, BOOK WATER---NEED H/P  CONSENT IN AM------HGBA1C    ESOPHAGOGASTRODUODENOSCOPY N/A 3/17/2023    Procedure: EGD (ESOPHAGOGASTRODUODENOSCOPY);  Surgeon: Gee Rdoriguez MD;  Location: [facility] ENDO (2ND FLR);  Service: Endoscopy;  Laterality: N/A;    FOOT AMPUTATION THROUGH METATARSAL Right 3/4/2023    Procedure: AMPUTATION, FOOT, TRANSMETATARSAL;  Surgeon: Benjamin Godfrey DPM;  Location: [facility] OR 2ND FLR;  Service: Podiatry;  Laterality: Right;    HYSTERECTOMY      ?unsure cervix present    INCISION AND DRAINAGE Right 4/12/2019    Procedure: Incision and Drainage -  R groin washout, possible muscle flap;  Surgeon: Danny Dunbar MD;  Location: [facility] OR 2ND FLR;  Service: Vascular;  Laterality: Right;  groin washout    INCISION AND DRAINAGE OF GROIN Right 5/3/2019    Procedure: Incision and Drainage R groin;  Surgeon: Danny Dunbar MD;  Location: [facility] OR 2ND FLR;  Service: Vascular;  Laterality: Right;     Family History       Problem Relation (Age of Onset)    Diabetes Father    Hypertension Father          Tobacco Use    Smoking status: Former     Types: Cigarettes     Quit date:      Years since quittin.2    Smokeless tobacco: Never   Substance and Sexual Activity    Alcohol use: No    Drug use: No    Sexual activity: Never     Review of Systems   Constitutional:  Negative for chills and fever.   Respiratory:  Negative for shortness of breath.    Cardiovascular:  Negative for chest pain.   Musculoskeletal:  Positive for myalgias.        Left leg rest pain   Neurological: Negative.  Negative for weakness and numbness.   Objective:     Vital Signs (Most Recent):  Temp: 97.9 °F (36.6 °C) (23 1735)  Pulse: 87 (23 2323)  Resp: 17 (23 2323)  BP: (!) 155/72 (23 2111)  SpO2: 96 % (23)   Vital Signs (24h Range):  Temp:  [97.9 °F (36.6 °C)] 97.9 °F (36.6 °C)  Pulse:  [87-88] 87  Resp:  [15-19] 17  SpO2:  [96 %-100 %] 96 %  BP: (106-155)/(58-72) 155/72     Weight: 67 kg (147 lb 11.3 oz)  Body mass index is 25.35 kg/m².    Physical Exam  Constitutional:       Appearance: Normal appearance.   HENT:      Head: Normocephalic and atraumatic.   Cardiovascular:      Rate and Rhythm: Normal rate and regular rhythm.      Comments: Right: DP Triphasic signal, PT Biphasic signal  Left: DP monophasic signal, PT monophasic signal  Pulmonary:      Effort: Pulmonary effort is normal.      Breath sounds: Normal breath sounds.   Skin:     General: Skin is warm and dry.      Capillary Refill: Capillary refill takes 2 to 3 seconds.   Neurological:       Mental Status: She is alert and oriented to person, place, and time.       Significant Labs:  Recent Lab Results         03/29/23 2105 03/29/23 2008        aPTT 27.4  Comment: aPTT therapeutic range = 39-69 seconds         Baso #   0.03       Basophil %   0.3       CRP   38.0       Differential Method   Automated       Eos #   0.4       Eosinophil %   3.2       Gran # (ANC)   9.2       Gran %   76.9       Hematocrit   25.0       Hemoglobin   7.5       Hepatitis C Ab   Non-reactive       HIV 1/2 Ag/Ab   Non-reactive       Immature Grans (Abs)   0.05  Comment: Mild elevation in immature granulocytes is non specific and   can be seen in a variety of conditions including stress response,   acute inflammation, trauma and pregnancy. Correlation with other   laboratory and clinical findings is essential.         Immature Granulocytes   0.4       INR 1.2  Comment: Coumadin Therapy:  2.0 - 3.0 for INR for all indicators except mechanical heart valves  and antiphospholipid syndromes which should use 2.5 - 3.5.           Lactate, Jasmeet   1.8  Comment: Falsely low lactic acid results can be found in samples   containing >=13.0 mg/dL total bilirubin and/or >=3.5 mg/dL   direct bilirubin.         Lymph #   1.8       Lymph %   15.3       MCH   24.9       MCHC   30.0       MCV   83       Mono #   0.5       Mono %   3.9       MPV   10.6       nRBC   0       Platelets   525       Protime 12.5         RBC   3.01       RDW   16.1       Sed Rate   69       WBC   11.94               Significant Diagnostics:  CT: No results found in the last 24 hours.  I have reviewed all pertinent imaging results/findings within the past 24 hours.

## 2023-03-30 NOTE — PLAN OF CARE
[facility]  Initial Discharge Assessment       Primary Care Provider: Juan F Garay    Admission Diagnosis: Leg pain [M79.606]  PAD (peripheral artery disease) [I73.9]    Admission Date: 3/29/2023  Expected Discharge Date:     Discharge Barriers Identified: None    Payor: HU[employer] MANAGED MEDICARE / Plan: HU[employer] MEDICARE HMO / Product Type: Capitation /     Extended Emergency Contact Information  Primary Emergency Contact: Ngoc Marino  Mobile Phone: [phone]  Relation: Daughter  Secondary Emergency Contact: Dimas Marino   [place]  Mobile Phone: [phone]  Relation: Spouse   needed? Yes    Discharge Plan A: Home Health  Discharge Plan B: Home      [facility]  [place] - [address]  [address]  [place] [address]  Phone: [phone] Fax: [phone]    [facility] - [facility] [address]  [address] [address]  Phone: [phone] Fax: [phone]      Initial Assessment (most recent)       Adult Discharge Assessment - 03/30/23 1405          Discharge Assessment    Assessment Type Discharge Planning Brief Assessment     Confirmed/corrected address, phone number and insurance No     Source of Information patient     When was your last doctors appointment? 02/28/23     Does patient/caregiver understand observation status No     Was observation education provided? Yes     People in Home spouse     Do you expect to return to your current living situation? No     Do you have help at home or someone to help you manage your care at home? Yes     Who are your caregiver(s) and their phone number(s)? [phone]     Prior to hospitilization cognitive status: Alert/Oriented     Current cognitive status: Alert/Oriented     Walking or Climbing Stairs ambulation difficulty, requires equipment     Home Accessibility wheelchair accessible     Home Layout Able to live on 1st floor     Equipment  Currently Used at Home none     Readmission within 30 days? Yes     Patient currently being followed by outpatient case management? No     Do you currently have service(s) that help you manage your care at home? Yes     How Many hours does patient receive services 12     Name and Contact number of agency [facility]     Is the pt/caregiver preference to resume services with current agency Yes     Do you take prescription medications? Yes     Do you have prescription coverage? Yes     Coverage [employer]     Do you have any problems affording any of your prescribed medications? No     Is the patient taking medications as prescribed? yes     Who is going to help you get home at discharge? [phone]     How do you get to doctors appointments? agency     Are you on dialysis? No     Do you take coumadin? No     Discharge Plan A Home Health     Discharge Plan B Home     DME Needed Upon Discharge  none     Discharge Plan discussed with: Patient     Discharge Barriers Identified None        Physical Activity    On average, how many days per week do you engage in moderate to strenuous exercise (like a brisk walk)? Patient refused     On average, how many minutes do you engage in exercise at this level? Patient refused        Financial Resource Strain    How hard is it for you to pay for the very basics like food, housing, medical care, and heating? Patient refused        Housing Stability    In the last 12 months, was there a time when you were not able to pay the mortgage or rent on time? Patient refused     In the last 12 months, was there a time when you did not have a steady place to sleep or slept in a shelter (including now)? Patient refused        Transportation Needs    In the past 12 months, has lack of transportation kept you from meetings, work, or from getting things needed for daily living? Patient refused        Food Insecurity    Within the past 12 months, you worried that your food would run  "out before you got the money to buy more. Patient refused     Within the past 12 months, the food you bought just didn't last and you didn't have money to get more. Patient refused        Stress    Do you feel stress - tense, restless, nervous, or anxious, or unable to sleep at night because your mind is troubled all the time - these days? Patient refused        Social Connections    In a typical week, how many times do you talk on the phone with family, friends, or neighbors? Patient refused     How often do you get together with friends or relatives? Patient refused     How often do you attend Orthodox or Pentecostal services? Patient refused     Do you belong to any clubs or organizations such as Orthodox groups, unions, fraternal or athletic groups, or school groups? Patient refused     How often do you attend meetings of the clubs or organizations you belong to? Patient refused     Are you , , , , never , or living with a partner? Patient refused        Alcohol Use    Q1: How often do you have a drink containing alcohol? Patient refused     Q2: How many drinks containing alcohol do you have on a typical day when you are drinking? Patient refused     Q3: How often do you have six or more drinks on one occasion? Patient refused                     Readmission Assessment (most recent)       Readmission Assessment - 03/30/23 1402          Readmission    Why were you hospitalized in the last 30 days? Yes     Why were you readmitted? Alarmed about signs/symptoms     When you left the hospital how did you feel? " I felt good to go home."     When you left the hospital where did you go? Home with Home Health     Did patient/caregiver refused recommended DC plan? No     Tell me about what happened between when you left the hospital and the day you returned. Pt began experincing worsening leg pain and was concerned. Presented to ED     When did you start not feeling well? 3/29/32     Did " you try to manage your symptoms your self? Yes     Did you call anyone? Yes     Who did you call? Home Health Nurse     Did you try to see or did see a doctor or nurse before you came? Yes     Were you seen? Yes     Did you have  a follow-up appointment on discharge? Yes     Did you go? Yes     Was this a planned readmission? No

## 2023-03-30 NOTE — CONSULTS
[facility]  Podiatry  Consult Note    Patient Name: Nelsy Marino  MRN: [id number]  Admission Date: 3/29/2023  Hospital Length of Stay: 1 days  Attending Physician: Danny Dunbar MD  Primary Care Provider: [facility]     Inpatient consult to Podiatry  Consult performed by: Lalo Pollock MD  Consult ordered by: Dusty Marcus MD        Subjective:     History of Present Illness:  83 yo F with hx of DM, PAD, DVT, RLE bypass 03/02 with R TMA 03/04 admitted for L groin incision dehiscence and continued rest pain in LLE. Patient reports that her R foot pain has improved since discharge, although the foot is still sensitive, RLE bypass is open. She had a new blister form on top of the left foot within the past week and has been applying betadine to it, it has drained.       Scheduled Meds:   amLODIPine  10 mg Oral Daily    aspirin  81 mg Oral Daily    atorvastatin  40 mg Oral QHS    carvediloL  12.5 mg Oral BID    clopidogreL  75 mg Oral Daily    DULoxetine  30 mg Oral BID    furosemide  20 mg Oral Daily    pantoprazole  40 mg Oral BID    piperacillin-tazobactam (ZOSYN) IVPB  4.5 g Intravenous Q8H     Continuous Infusions:   heparin (porcine) in D5W 14 Units/kg/hr (03/30/23 1006)     PRN Meds:sodium chloride, dextrose 10%, glucagon (human recombinant), heparin (PORCINE), heparin (PORCINE), insulin aspart U-100, ondansetron, oxyCODONE, sodium chloride 0.9%, Pharmacy to dose Vancomycin consult **AND** vancomycin - pharmacy to dose    Review of patient's allergies indicates:   Allergen Reactions    Motrin [ibuprofen] Itching        Past Medical History:   Diagnosis Date    CHF (congestive heart failure)     Diabetes mellitus     DVT (deep venous thrombosis)     Hypertension     Miscarriage      Past Surgical History:   Procedure Laterality Date     femoral vascular surgery       ANGIOGRAPHY OF LOWER EXTREMITY Right 2/23/2023    Procedure: Angiogram Extremity Unilateral;   Surgeon: Danny Dunbar MD;  Location: [facility] OR;  Service: Vascular;  Laterality: Right;  RN PHONE PREOP 2/20/2023----CK CONSENT    CREATION OF FEMORAL-FEMORAL ARTERY BYPASS WITH GRAFT Bilateral 3/2/2023    Procedure: CREATION, BYPASS, ARTERIAL, FEMORAL TO FEMORAL, USING GRAFT, LEFT FEMORAL ENDARTERECTOMY, LEFT ILIAC ARTERY STENT PLACEMENT, RIGHT LOWER EXTREMITY ANGIOGRAM AND RIGHT SFA  ANGIOPLASTY;  Surgeon: Danny Dunbar MD;  [facility];  Service: Vascular;  Laterality: Bilateral;   744.47 MGY  175.36 Gycm  Flouro time 20.5 mins      CREATION OF ILIOFEMORAL ARTERY BYPASS Right 3/20/2019    Procedure: CREATION, BYPASS, ARTERIAL, ILIAC TO FEMORAL, RIGHT LOWER EXTREMITY, RIGHT PROFUNDAPLASTY;  Surgeon: Danny Dunbar MD;  Location: [facility] OR;  Service: Vascular;  Laterality: Right;  11:00AM START PER ANNE  RN PREOP 3/13/2019  ACT'S, CELL SAVER, GRAFTS, BOOK WATER---NEED H/P  CONSENT IN AM------HGBA1C    ESOPHAGOGASTRODUODENOSCOPY N/A 3/17/2023    Procedure: EGD (ESOPHAGOGASTRODUODENOSCOPY);  Surgeon: Gee Rodriguez MD;  [facility]  Service: Endoscopy;  Laterality: N/A;    FOOT AMPUTATION THROUGH METATARSAL Right 3/4/2023    Procedure: AMPUTATION, FOOT, TRANSMETATARSAL;  Surgeon: Benjamin Godfrey DPM;  [facility];  Service: Podiatry;  Laterality: Right;    HYSTERECTOMY      ?unsure cervix present    INCISION AND DRAINAGE Right 4/12/2019    Procedure: Incision and Drainage - R groin washout, possible muscle flap;  Surgeon: Danny Dunbar MD;  [facility];  Service: Vascular;  Laterality: Right;  groin washout    INCISION AND DRAINAGE OF GROIN Right 5/3/2019    Procedure: Incision and Drainage R groin;  Surgeon: Danny Dunbar MD;  [facility];  Service: Vascular;  Laterality: Right;       Family History       Problem Relation (Age of Onset)    Diabetes Father    Hypertension Father          Tobacco  Use    Smoking status: Former     Types: Cigarettes     Quit date:      Years since quittin.2    Smokeless tobacco: Never   Substance and Sexual Activity    Alcohol use: No    Drug use: No    Sexual activity: Never     Review of Systems   Constitutional:  Negative for chills, diaphoresis and fever.   Respiratory:  Negative for cough and shortness of breath.    Cardiovascular:  Negative for chest pain and leg swelling.   Gastrointestinal:  Negative for abdominal pain, diarrhea, nausea and vomiting.   Genitourinary: Negative.    Musculoskeletal:  Positive for gait problem.   Skin:  Positive for color change and wound.   Psychiatric/Behavioral:  Negative for agitation, confusion and decreased concentration. The patient is not nervous/anxious.    Objective:     Vital Signs (Most Recent):  Temp: 96.1 °F (35.6 °C) (23 1002)  Pulse: 89 (23 1002)  Resp: 16 (23 1002)  BP: (!) 143/62 (23 1033)  SpO2: (!) 94 % (23 1002)   Vital Signs (24h Range):  Temp:  [96.1 °F (35.6 °C)-98.7 °F (37.1 °C)] 96.1 °F (35.6 °C)  Pulse:  [75-90] 89  Resp:  [13-20] 16  SpO2:  [91 %-100 %] 94 %  BP: (106-155)/(58-72) 143/62     Weight: 67 kg (147 lb 11.3 oz)  Body mass index is 25.35 kg/m².    Foot Exam    General  Orientation: alert and oriented to person, place, and time   Affect: appropriate       Right Foot/Ankle     Inspection and Palpation  Tenderness: (TMA site, mild)  Swelling: none   Skin Exam: abnormal color and ulcer (TMA incision site); no drainage (resolved) and no maceration (resolved)     Neurovascular  Dorsalis pedis: doppler  Posterior tibial: doppler  Saphenous nerve sensation: diminished  Tibial nerve sensation: diminished  Superficial peroneal nerve sensation: diminished  Deep peroneal nerve sensation: diminished  Sural nerve sensation: diminished    Comments  -TMA now with sutures removed, eschar/necrosis along incision but stable, no drainage, no cellulitis, mild to moderate  tenderness on palpation    Left Foot/Ankle      Inspection and Palpation  Tenderness: (rest pain LLE)  Swelling: none   Skin Exam: blister;     Neurovascular  Dorsalis pedis: absent  Posterior tibial: doppler  Saphenous nerve sensation: diminished  Tibial nerve sensation: diminished  Superficial peroneal nerve sensation: diminished  Deep peroneal nerve sensation: diminished  Sural nerve sensation: diminished    Comments  -Blister at dorsal foot, spontaneously ruptured with roof primarily intact, no concerns                  Laboratory:  Blood Cultures:   Recent Labs   Lab 03/29/23 2009   LABBLOO No Growth to date  No Growth to date     CBC:   Recent Labs   Lab 03/30/23  0342   WBC 9.55   RBC 2.63*   HGB 6.3*   HCT 22.0*      MCV 84   MCH 24.0*   MCHC 28.6*     CMP:   Recent Labs   Lab 03/29/23  2322   *   CALCIUM 9.1   ALBUMIN 2.4*   PROT 7.5      K 4.8   CO2 24      BUN 46*   CREATININE 1.2   ALKPHOS 64   ALT 8*   AST 20   BILITOT 0.5     CRP:   Recent Labs   Lab 03/29/23 2008   CRP 38.0*     ESR:   Recent Labs   Lab 03/29/23 2008   SEDRATE 69*     Microbiology Results (last 7 days)       Procedure Component Value Units Date/Time    Blood culture #1 **CANNOT BE ORDERED STAT** [[id number]] Collected: 03/29/23 2009    Order Status: Completed Specimen: Blood from Peripheral, Antecubital, Left Updated: 03/30/23 0515     Blood Culture, Routine No Growth to date    Blood culture #2 **CANNOT BE ORDERED STAT** [[id number]] Collected: 03/29/23 2009    Order Status: Completed Specimen: Blood from Peripheral, Antecubital, Right Updated: 03/30/23 0515     Blood Culture, Routine No Growth to date    Blood culture [[id number]]     Order Status: Canceled Specimen: Blood     Blood culture [[id number]]     Order Status: Canceled Specimen: Blood           Specimen (24h ago, onward)      None                Assessment/Plan:     Derm  Eschar of foot  Plan:  -Sutures removed from right foot TMA site. May  debride right foot eschar inpatient vs outpatient, but OK for discharge once otherwise ready.  -Antibiotic plan per ID. Feet are not clinically infected.   -Dressing changes by nursing.  -RLE WBAT in surgical shoe.   -Podiatry will follow.     Cardiac/Vascular  PVD (peripheral vascular disease)  See rest of plan        Thank you for your consult. I will follow-up with patient. Please contact us if you have any additional questions.      Lalo Pollock DPM PGY-3  Podiatric Medicine & Surgery  [facility]  Secure Chat Preferred  Mobile: [phone]  Pager: [phone]

## 2023-03-30 NOTE — SUBJECTIVE & OBJECTIVE
Scheduled Meds:   amLODIPine  10 mg Oral Daily    aspirin  81 mg Oral Daily    atorvastatin  40 mg Oral QHS    carvediloL  12.5 mg Oral BID    clopidogreL  75 mg Oral Daily    DULoxetine  30 mg Oral BID    furosemide  20 mg Oral Daily    pantoprazole  40 mg Oral BID    piperacillin-tazobactam (ZOSYN) IVPB  4.5 g Intravenous Q8H     Continuous Infusions:   heparin (porcine) in D5W 14 Units/kg/hr (03/30/23 1006)     PRN Meds:sodium chloride, dextrose 10%, glucagon (human recombinant), heparin (PORCINE), heparin (PORCINE), insulin aspart U-100, ondansetron, oxyCODONE, sodium chloride 0.9%, Pharmacy to dose Vancomycin consult **AND** vancomycin - pharmacy to dose    Review of patient's allergies indicates:   Allergen Reactions    Motrin [ibuprofen] Itching        Past Medical History:   Diagnosis Date    CHF (congestive heart failure)     Diabetes mellitus     DVT (deep venous thrombosis)     Hypertension     Miscarriage      Past Surgical History:   Procedure Laterality Date     femoral vascular surgery       ANGIOGRAPHY OF LOWER EXTREMITY Right 2/23/2023    Procedure: Angiogram Extremity Unilateral;  Surgeon: Danny Dunbar MD;  Location: [facility];  Service: Vascular;  Laterality: Right;  RN PHONE PREOP 2/20/2023----CK CONSENT    CREATION OF FEMORAL-FEMORAL ARTERY BYPASS WITH GRAFT Bilateral 3/2/2023    Procedure: CREATION, BYPASS, ARTERIAL, FEMORAL TO FEMORAL, USING GRAFT, LEFT FEMORAL ENDARTERECTOMY, LEFT ILIAC ARTERY STENT PLACEMENT, RIGHT LOWER EXTREMITY ANGIOGRAM AND RIGHT SFA  ANGIOPLASTY;  Surgeon: Danny Dunbar MD;  Location: [address];  Service: Vascular;  Laterality: Bilateral;   744.47 MGY  175.36 Gycm  Flouro time 20.5 mins      CREATION OF ILIOFEMORAL ARTERY BYPASS Right 3/20/2019    Procedure: CREATION, BYPASS, ARTERIAL, ILIAC TO FEMORAL, RIGHT LOWER EXTREMITY, RIGHT PROFUNDAPLASTY;  Surgeon: Danny Dunbar MD;  Location: [facility] OR;  Service: Vascular;  Laterality: Right;  11:00AM  START PER ANNE RICHARDS PREOP 3/13/2019  ACT'S, CELL SAVER, GRAFTS, BOOK WATER---NEED H/P  CONSENT IN AM------HGBA1C    ESOPHAGOGASTRODUODENOSCOPY N/A 3/17/2023    Procedure: EGD (ESOPHAGOGASTRODUODENOSCOPY);  Surgeon: Gee Rodriguez MD;  Location: [facility] (2ND FLR);  Service: Endoscopy;  Laterality: N/A;    FOOT AMPUTATION THROUGH METATARSAL Right 3/4/2023    Procedure: AMPUTATION, FOOT, TRANSMETATARSAL;  Surgeon: Benjamin Godfrey DPM;  [facility];  Service: Podiatry;  Laterality: Right;    HYSTERECTOMY      ?unsure cervix present    INCISION AND DRAINAGE Right 2019    Procedure: Incision and Drainage - R groin washout, possible muscle flap;  Surgeon: Danny Dunbar MD;  [facility];  Service: Vascular;  Laterality: Right;  groin washout    INCISION AND DRAINAGE OF GROIN Right 5/3/2019    Procedure: Incision and Drainage R groin;  Surgeon: Danny Dunbar MD;  [facility];  Service: Vascular;  Laterality: Right;       Family History       Problem Relation (Age of Onset)    Diabetes Father    Hypertension Father          Tobacco Use    Smoking status: Former     Types: Cigarettes     Quit date: 1980     Years since quittin.2    Smokeless tobacco: Never   Substance and Sexual Activity    Alcohol use: No    Drug use: No    Sexual activity: Never     Review of Systems   Constitutional:  Negative for chills, diaphoresis and fever.   Respiratory:  Negative for cough and shortness of breath.    Cardiovascular:  Negative for chest pain and leg swelling.   Gastrointestinal:  Negative for abdominal pain, diarrhea, nausea and vomiting.   Genitourinary: Negative.    Musculoskeletal:  Positive for gait problem.   Skin:  Positive for color change and wound.   Psychiatric/Behavioral:  Negative for agitation, confusion and decreased concentration. The patient is not nervous/anxious.    Objective:     Vital Signs (Most Recent):  Temp: 96.1 °F (35.6 °C) (23  1002)  Pulse: 89 (03/30/23 1002)  Resp: 16 (03/30/23 1002)  BP: (!) 143/62 (03/30/23 1033)  SpO2: (!) 94 % (03/30/23 1002)   Vital Signs (24h Range):  Temp:  [96.1 °F (35.6 °C)-98.7 °F (37.1 °C)] 96.1 °F (35.6 °C)  Pulse:  [75-90] 89  Resp:  [13-20] 16  SpO2:  [91 %-100 %] 94 %  BP: (106-155)/(58-72) 143/62     Weight: 67 kg (147 lb 11.3 oz)  Body mass index is 25.35 kg/m².    Foot Exam    General  Orientation: alert and oriented to person, place, and time   Affect: appropriate       Right Foot/Ankle     Inspection and Palpation  Tenderness: (TMA site, mild)  Swelling: none   Skin Exam: abnormal color and ulcer (TMA incision site); no drainage (resolved) and no maceration (resolved)     Neurovascular  Dorsalis pedis: doppler  Posterior tibial: doppler  Saphenous nerve sensation: diminished  Tibial nerve sensation: diminished  Superficial peroneal nerve sensation: diminished  Deep peroneal nerve sensation: diminished  Sural nerve sensation: diminished    Comments  -TMA now with sutures removed, eschar/necrosis along incision but stable, no drainage, no cellulitis, mild to moderate tenderness on palpation    Left Foot/Ankle      Inspection and Palpation  Tenderness: (rest pain LLE)  Swelling: none   Skin Exam: blister;     Neurovascular  Dorsalis pedis: absent  Posterior tibial: doppler  Saphenous nerve sensation: diminished  Tibial nerve sensation: diminished  Superficial peroneal nerve sensation: diminished  Deep peroneal nerve sensation: diminished  Sural nerve sensation: diminished    Comments  -Blister at dorsal foot, spontaneously ruptured with roof primarily intact, no concerns                  Laboratory:  Blood Cultures:   Recent Labs   Lab 03/29/23 2009   LABBLOO No Growth to date  No Growth to date     CBC:   Recent Labs   Lab 03/30/23  0342   WBC 9.55   RBC 2.63*   HGB 6.3*   HCT 22.0*      MCV 84   MCH 24.0*   MCHC 28.6*     CMP:   Recent Labs   Lab 03/29/23  2322   *   CALCIUM 9.1    ALBUMIN 2.4*   PROT 7.5      K 4.8   CO2 24      BUN 46*   CREATININE 1.2   ALKPHOS 64   ALT 8*   AST 20   BILITOT 0.5     CRP:   Recent Labs   Lab 03/29/23 2008   CRP 38.0*     ESR:   Recent Labs   Lab 03/29/23 2008   SEDRATE 69*     Microbiology Results (last 7 days)       Procedure Component Value Units Date/Time    Blood culture #1 **CANNOT BE ORDERED STAT** [[id number]] Collected: 03/29/23 2009    Order Status: Completed Specimen: Blood from Peripheral, Antecubital, Left Updated: 03/30/23 0515     Blood Culture, Routine No Growth to date    Blood culture #2 **CANNOT BE ORDERED STAT** [[id number]] Collected: 03/29/23 2009    Order Status: Completed Specimen: Blood from Peripheral, Antecubital, Right Updated: 03/30/23 0515     Blood Culture, Routine No Growth to date    Blood culture [[id number]]     Order Status: Canceled Specimen: Blood     Blood culture [[id number]]     Order Status: Canceled Specimen: Blood           Specimen (24h ago, onward)      None

## 2023-03-30 NOTE — ASSESSMENT & PLAN NOTE
Plan:  -Sutures removed from right foot TMA site. May debride right foot eschar inpatient vs outpatient, but OK for discharge once otherwise ready.  -Antibiotic plan per ID. Feet are not clinically infected.   -Dressing changes by nursing.  -RLE WBAT in surgical shoe.   -Podiatry will follow.

## 2023-03-30 NOTE — HPI
Nelsy Marino is a 82 y.o. woman with history of HTN, T2DM, DVT, and PAD who underwent left to right fem to SFA bypass and right SFA angioplasty on 3/2/23 and was admitted until 3/20. She was seen by Dr. Dunbar in the office earlier today complaining of left wrist pain and also found to have dehiscence of her left groin incision.  She had a postoperative ARCHIE performed on 03/16 which demonstrated a toe pressure of 0 on the left foot and waveforms and PVR concerning for severe occlusive disease.    On my exam, patient is continuing to have rest pain, per her  she is minimally mobile at home with full assist for any activity.  Denies any fevers, chills, sweats.  Has been using supplemental oxygen 2 L at home however is 99% on room air in the emergency department without any supplemental.  The spouse states that the left groin incision dehisced proximally 3 days ago and has had fibrinous debris on the gauze.  No specific expressed purulence.    Labs are slightly improved from previous 9 days ago (WBC 12 from 13, CRP 38 from 200); CTA with focal occlusion of CFA just distal to takeoff of new bypass at the level of the previous with reconstitution and flow distal via 3 vessel runoff. The Fem-SFA bypass is open and patent to foot. Dehiscence on scan does not appear to involve graft but there are inflammatory changes in the area and hard to definitively rule out graft infection.     Exam monophasic PT/DP on left, triphasic DP, biphasic PT on right. Wound with pain on palpation, sloughing tissue and dead fat, serous fluid from short cavity (did not probe).

## 2023-03-30 NOTE — HPI
81 yo F with hx of DM, PAD, DVT, RLE bypass 03/02 with R TMA 03/04 admitted for L groin incision dehiscence and continued rest pain in LLE. Patient reports that her R foot pain has improved since discharge, although the foot is still sensitive, RLE bypass is open. She had a new blister form on top of the left foot within the past week and has been applying betadine to it, it has drained.

## 2023-03-31 ENCOUNTER — ANESTHESIA (OUTPATIENT)
Dept: SURGERY | Facility: HOSPITAL | Age: 83
DRG: 901 | End: 2023-03-31
Payer: MEDICARE

## 2023-03-31 LAB
APTT PPP: 32.8 SEC (ref 21–32)
BASOPHILS # BLD AUTO: 0.01 K/UL (ref 0–0.2)
BASOPHILS # BLD AUTO: 0.03 K/UL (ref 0–0.2)
BASOPHILS NFR BLD: 0.1 % (ref 0–1.9)
BASOPHILS NFR BLD: 0.3 % (ref 0–1.9)
CK SERPL-CCNC: 50 U/L (ref 20–180)
DIFFERENTIAL METHOD: ABNORMAL
DIFFERENTIAL METHOD: ABNORMAL
EOSINOPHIL # BLD AUTO: 0 K/UL (ref 0–0.5)
EOSINOPHIL # BLD AUTO: 0.4 K/UL (ref 0–0.5)
EOSINOPHIL NFR BLD: 0 % (ref 0–8)
EOSINOPHIL NFR BLD: 3.7 % (ref 0–8)
ERYTHROCYTE [DISTWIDTH] IN BLOOD BY AUTOMATED COUNT: 15.3 % (ref 11.5–14.5)
ERYTHROCYTE [DISTWIDTH] IN BLOOD BY AUTOMATED COUNT: 15.7 % (ref 11.5–14.5)
GRAM STN SPEC: NORMAL
HCT VFR BLD AUTO: 33.7 % (ref 37–48.5)
HCT VFR BLD AUTO: 36.5 % (ref 37–48.5)
HGB BLD-MCNC: 10.8 G/DL (ref 12–16)
HGB BLD-MCNC: 11.3 G/DL (ref 12–16)
IMM GRANULOCYTES # BLD AUTO: 0.02 K/UL (ref 0–0.04)
IMM GRANULOCYTES # BLD AUTO: 0.05 K/UL (ref 0–0.04)
IMM GRANULOCYTES NFR BLD AUTO: 0.2 % (ref 0–0.5)
IMM GRANULOCYTES NFR BLD AUTO: 0.4 % (ref 0–0.5)
LYMPHOCYTES # BLD AUTO: 0.9 K/UL (ref 1–4.8)
LYMPHOCYTES # BLD AUTO: 1.4 K/UL (ref 1–4.8)
LYMPHOCYTES NFR BLD: 13.3 % (ref 18–48)
LYMPHOCYTES NFR BLD: 6.6 % (ref 18–48)
MCH RBC QN AUTO: 26.7 PG (ref 27–31)
MCH RBC QN AUTO: 26.9 PG (ref 27–31)
MCHC RBC AUTO-ENTMCNC: 31 G/DL (ref 32–36)
MCHC RBC AUTO-ENTMCNC: 32 G/DL (ref 32–36)
MCV RBC AUTO: 84 FL (ref 82–98)
MCV RBC AUTO: 86 FL (ref 82–98)
MONOCYTES # BLD AUTO: 0.2 K/UL (ref 0.3–1)
MONOCYTES # BLD AUTO: 0.4 K/UL (ref 0.3–1)
MONOCYTES NFR BLD: 1.4 % (ref 4–15)
MONOCYTES NFR BLD: 4 % (ref 4–15)
NEUTROPHILS # BLD AUTO: 12.8 K/UL (ref 1.8–7.7)
NEUTROPHILS # BLD AUTO: 8.1 K/UL (ref 1.8–7.7)
NEUTROPHILS NFR BLD: 78.5 % (ref 38–73)
NEUTROPHILS NFR BLD: 91.5 % (ref 38–73)
NRBC BLD-RTO: 0 /100 WBC
NRBC BLD-RTO: 0 /100 WBC
PLATELET # BLD AUTO: 408 K/UL (ref 150–450)
PLATELET # BLD AUTO: 411 K/UL (ref 150–450)
PMV BLD AUTO: 10.3 FL (ref 9.2–12.9)
PMV BLD AUTO: 10.4 FL (ref 9.2–12.9)
POCT GLUCOSE: 218 MG/DL (ref 70–110)
POCT GLUCOSE: 219 MG/DL (ref 70–110)
POCT GLUCOSE: 219 MG/DL (ref 70–110)
POCT GLUCOSE: 256 MG/DL (ref 70–110)
POCT GLUCOSE: 295 MG/DL (ref 70–110)
POCT GLUCOSE: 334 MG/DL (ref 70–110)
RBC # BLD AUTO: 4.02 M/UL (ref 4–5.4)
RBC # BLD AUTO: 4.24 M/UL (ref 4–5.4)
VANCOMYCIN SERPL-MCNC: 6.3 UG/ML
WBC # BLD AUTO: 10.35 K/UL (ref 3.9–12.7)
WBC # BLD AUTO: 14 K/UL (ref 3.9–12.7)

## 2023-03-31 PROCEDURE — 25000242 PHARM REV CODE 250 ALT 637 W/ HCPCS: Performed by: STUDENT IN AN ORGANIZED HEALTH CARE EDUCATION/TRAINING PROGRAM

## 2023-03-31 PROCEDURE — 85730 THROMBOPLASTIN TIME PARTIAL: CPT | Performed by: STUDENT IN AN ORGANIZED HEALTH CARE EDUCATION/TRAINING PROGRAM

## 2023-03-31 PROCEDURE — 14301 TIS TRNFR ANY 30.1-60 SQ CM: CPT | Mod: 51,,, | Performed by: SURGERY

## 2023-03-31 PROCEDURE — 87206 SMEAR FLUORESCENT/ACID STAI: CPT | Mod: 91 | Performed by: SURGERY

## 2023-03-31 PROCEDURE — 87205 SMEAR GRAM STAIN: CPT | Mod: 59 | Performed by: SURGERY

## 2023-03-31 PROCEDURE — 71000033 HC RECOVERY, INTIAL HOUR: Performed by: SURGERY

## 2023-03-31 PROCEDURE — 82962 GLUCOSE BLOOD TEST: CPT | Performed by: SURGERY

## 2023-03-31 PROCEDURE — 87015 SPECIMEN INFECT AGNT CONCNTJ: CPT | Performed by: SURGERY

## 2023-03-31 PROCEDURE — 25000003 PHARM REV CODE 250: Performed by: STUDENT IN AN ORGANIZED HEALTH CARE EDUCATION/TRAINING PROGRAM

## 2023-03-31 PROCEDURE — 37000009 HC ANESTHESIA EA ADD 15 MINS: Performed by: SURGERY

## 2023-03-31 PROCEDURE — 85025 COMPLETE CBC W/AUTO DIFF WBC: CPT | Mod: 91 | Performed by: STUDENT IN AN ORGANIZED HEALTH CARE EDUCATION/TRAINING PROGRAM

## 2023-03-31 PROCEDURE — 15004 PR WND PREP PED, FACE/NCK/HND/FT/GEN 1ST 100 CM: ICD-10-PCS | Mod: ,,, | Performed by: SURGERY

## 2023-03-31 PROCEDURE — 63600175 PHARM REV CODE 636 W HCPCS: Performed by: SURGERY

## 2023-03-31 PROCEDURE — 87070 CULTURE OTHR SPECIMN AEROBIC: CPT | Mod: 59 | Performed by: SURGERY

## 2023-03-31 PROCEDURE — 27201423 OPTIME MED/SURG SUP & DEVICES STERILE SUPPLY: Performed by: SURGERY

## 2023-03-31 PROCEDURE — 15005 WND PREP F/N/HF/G ADDL CM: CPT | Mod: ,,, | Performed by: SURGERY

## 2023-03-31 PROCEDURE — 37000008 HC ANESTHESIA 1ST 15 MINUTES: Performed by: SURGERY

## 2023-03-31 PROCEDURE — 14301 PR ADJ TISS XFER ANY AREA,30.1-60 SQCM: ICD-10-PCS | Mod: 51,,, | Performed by: SURGERY

## 2023-03-31 PROCEDURE — 63600175 PHARM REV CODE 636 W HCPCS: Performed by: STUDENT IN AN ORGANIZED HEALTH CARE EDUCATION/TRAINING PROGRAM

## 2023-03-31 PROCEDURE — C1729 CATH, DRAINAGE: HCPCS | Performed by: SURGERY

## 2023-03-31 PROCEDURE — 87075 CULTR BACTERIA EXCEPT BLOOD: CPT | Mod: 59 | Performed by: SURGERY

## 2023-03-31 PROCEDURE — 20600001 HC STEP DOWN PRIVATE ROOM

## 2023-03-31 PROCEDURE — 15838: ICD-10-PCS | Mod: ,,, | Performed by: SURGERY

## 2023-03-31 PROCEDURE — 82550 ASSAY OF CK (CPK): CPT | Performed by: PHYSICIAN ASSISTANT

## 2023-03-31 PROCEDURE — 87106 FUNGI IDENTIFICATION YEAST: CPT | Performed by: SURGERY

## 2023-03-31 PROCEDURE — 87116 MYCOBACTERIA CULTURE: CPT | Performed by: SURGERY

## 2023-03-31 PROCEDURE — 87070 CULTURE OTHR SPECIMN AEROBIC: CPT | Performed by: SURGERY

## 2023-03-31 PROCEDURE — 87205 SMEAR GRAM STAIN: CPT | Performed by: SURGERY

## 2023-03-31 PROCEDURE — D9220A PRA ANESTHESIA: ICD-10-PCS | Mod: CRNA,,, | Performed by: NURSE ANESTHETIST, CERTIFIED REGISTERED

## 2023-03-31 PROCEDURE — 25000003 PHARM REV CODE 250: Performed by: SURGERY

## 2023-03-31 PROCEDURE — D9220A PRA ANESTHESIA: Mod: CRNA,,, | Performed by: NURSE ANESTHETIST, CERTIFIED REGISTERED

## 2023-03-31 PROCEDURE — 85025 COMPLETE CBC W/AUTO DIFF WBC: CPT | Performed by: STUDENT IN AN ORGANIZED HEALTH CARE EDUCATION/TRAINING PROGRAM

## 2023-03-31 PROCEDURE — 15838 EXC EXCSV SUBMENTAL FAT PAD: CPT | Mod: ,,, | Performed by: SURGERY

## 2023-03-31 PROCEDURE — 36000706: Performed by: SURGERY

## 2023-03-31 PROCEDURE — 15005 PR WND PREP,PED, FACE/NCK/HND/FT/GEN ADD 100 CM: ICD-10-PCS | Mod: ,,, | Performed by: SURGERY

## 2023-03-31 PROCEDURE — 15004 WOUND PREP F/N/HF/G: CPT | Mod: ,,, | Performed by: SURGERY

## 2023-03-31 PROCEDURE — 87076 CULTURE ANAEROBE IDENT EACH: CPT | Performed by: SURGERY

## 2023-03-31 PROCEDURE — 87206 SMEAR FLUORESCENT/ACID STAI: CPT | Performed by: SURGERY

## 2023-03-31 PROCEDURE — 71000015 HC POSTOP RECOV 1ST HR: Performed by: SURGERY

## 2023-03-31 PROCEDURE — 85730 THROMBOPLASTIN TIME PARTIAL: CPT | Mod: 91 | Performed by: SURGERY

## 2023-03-31 PROCEDURE — 87186 SC STD MICRODIL/AGAR DIL: CPT | Mod: 59 | Performed by: SURGERY

## 2023-03-31 PROCEDURE — D9220A PRA ANESTHESIA: Mod: ANES,,, | Performed by: ANESTHESIOLOGY

## 2023-03-31 PROCEDURE — 87116 MYCOBACTERIA CULTURE: CPT | Mod: 59 | Performed by: SURGERY

## 2023-03-31 PROCEDURE — 36415 COLL VENOUS BLD VENIPUNCTURE: CPT | Performed by: STUDENT IN AN ORGANIZED HEALTH CARE EDUCATION/TRAINING PROGRAM

## 2023-03-31 PROCEDURE — D9220A PRA ANESTHESIA: ICD-10-PCS | Mod: ANES,,, | Performed by: ANESTHESIOLOGY

## 2023-03-31 PROCEDURE — 36415 COLL VENOUS BLD VENIPUNCTURE: CPT | Performed by: SURGERY

## 2023-03-31 PROCEDURE — 80202 ASSAY OF VANCOMYCIN: CPT | Performed by: SURGERY

## 2023-03-31 PROCEDURE — 36000707: Performed by: SURGERY

## 2023-03-31 PROCEDURE — 87075 CULTR BACTERIA EXCEPT BLOOD: CPT | Performed by: SURGERY

## 2023-03-31 RX ORDER — ROCURONIUM BROMIDE 10 MG/ML
INJECTION, SOLUTION INTRAVENOUS
Status: DISCONTINUED | OUTPATIENT
Start: 2023-03-31 | End: 2023-03-31

## 2023-03-31 RX ORDER — PROPOFOL 10 MG/ML
VIAL (ML) INTRAVENOUS
Status: DISCONTINUED | OUTPATIENT
Start: 2023-03-31 | End: 2023-03-31

## 2023-03-31 RX ORDER — CEFEPIME HYDROCHLORIDE 2 G/50ML
2 INJECTION, SOLUTION INTRAVENOUS
Status: DISCONTINUED | OUTPATIENT
Start: 2023-03-31 | End: 2023-03-31

## 2023-03-31 RX ORDER — SODIUM CHLORIDE 0.9 % (FLUSH) 0.9 %
10 SYRINGE (ML) INJECTION
Status: DISCONTINUED | OUTPATIENT
Start: 2023-03-31 | End: 2023-03-31

## 2023-03-31 RX ORDER — DEXAMETHASONE SODIUM PHOSPHATE 4 MG/ML
INJECTION, SOLUTION INTRA-ARTICULAR; INTRALESIONAL; INTRAMUSCULAR; INTRAVENOUS; SOFT TISSUE
Status: DISCONTINUED | OUTPATIENT
Start: 2023-03-31 | End: 2023-03-31

## 2023-03-31 RX ORDER — LIDOCAINE HYDROCHLORIDE 20 MG/ML
INJECTION INTRAVENOUS
Status: DISCONTINUED | OUTPATIENT
Start: 2023-03-31 | End: 2023-03-31

## 2023-03-31 RX ORDER — FENTANYL CITRATE 50 UG/ML
INJECTION, SOLUTION INTRAMUSCULAR; INTRAVENOUS
Status: DISCONTINUED | OUTPATIENT
Start: 2023-03-31 | End: 2023-03-31

## 2023-03-31 RX ORDER — HYDROMORPHONE HYDROCHLORIDE 1 MG/ML
0.5 INJECTION, SOLUTION INTRAMUSCULAR; INTRAVENOUS; SUBCUTANEOUS EVERY 6 HOURS PRN
Status: DISCONTINUED | OUTPATIENT
Start: 2023-03-31 | End: 2023-04-02

## 2023-03-31 RX ORDER — PHENYLEPHRINE HYDROCHLORIDE 10 MG/ML
INJECTION INTRAVENOUS
Status: DISCONTINUED | OUTPATIENT
Start: 2023-03-31 | End: 2023-03-31

## 2023-03-31 RX ORDER — OXYCODONE HYDROCHLORIDE 10 MG/1
10 TABLET ORAL EVERY 4 HOURS PRN
Status: DISCONTINUED | OUTPATIENT
Start: 2023-03-31 | End: 2023-04-02

## 2023-03-31 RX ORDER — ALBUTEROL SULFATE 90 UG/1
AEROSOL, METERED RESPIRATORY (INHALATION)
Status: DISCONTINUED | OUTPATIENT
Start: 2023-03-31 | End: 2023-03-31

## 2023-03-31 RX ORDER — SODIUM CHLORIDE 9 MG/ML
INJECTION, SOLUTION INTRAVENOUS CONTINUOUS PRN
Status: DISCONTINUED | OUTPATIENT
Start: 2023-03-31 | End: 2023-03-31

## 2023-03-31 RX ADMIN — SUGAMMADEX 200 MG: 100 INJECTION, SOLUTION INTRAVENOUS at 10:03

## 2023-03-31 RX ADMIN — FENTANYL CITRATE 25 MCG: 50 INJECTION, SOLUTION INTRAMUSCULAR; INTRAVENOUS at 10:03

## 2023-03-31 RX ADMIN — FUROSEMIDE 20 MG: 20 TABLET ORAL at 12:03

## 2023-03-31 RX ADMIN — HYDROMORPHONE HYDROCHLORIDE 0.5 MG: 1 INJECTION, SOLUTION INTRAMUSCULAR; INTRAVENOUS; SUBCUTANEOUS at 03:03

## 2023-03-31 RX ADMIN — LIDOCAINE HYDROCHLORIDE 80 MG: 20 INJECTION INTRAVENOUS at 07:03

## 2023-03-31 RX ADMIN — PANTOPRAZOLE SODIUM 40 MG: 40 TABLET, DELAYED RELEASE ORAL at 12:03

## 2023-03-31 RX ADMIN — CARVEDILOL 12.5 MG: 12.5 TABLET, FILM COATED ORAL at 12:03

## 2023-03-31 RX ADMIN — DULOXETINE 30 MG: 30 CAPSULE, DELAYED RELEASE ORAL at 12:03

## 2023-03-31 RX ADMIN — ATORVASTATIN CALCIUM 40 MG: 40 TABLET, FILM COATED ORAL at 08:03

## 2023-03-31 RX ADMIN — PHENYLEPHRINE HYDROCHLORIDE 100 MCG: 10 INJECTION INTRAVENOUS at 08:03

## 2023-03-31 RX ADMIN — ROCURONIUM BROMIDE 40 MG: 10 INJECTION, SOLUTION INTRAVENOUS at 07:03

## 2023-03-31 RX ADMIN — DEXAMETHASONE SODIUM PHOSPHATE 4 MG: 4 INJECTION, SOLUTION INTRAMUSCULAR; INTRAVENOUS at 07:03

## 2023-03-31 RX ADMIN — FENTANYL CITRATE 50 MCG: 50 INJECTION, SOLUTION INTRAMUSCULAR; INTRAVENOUS at 07:03

## 2023-03-31 RX ADMIN — PANTOPRAZOLE SODIUM 40 MG: 40 TABLET, DELAYED RELEASE ORAL at 08:03

## 2023-03-31 RX ADMIN — OXYCODONE 5 MG: 5 TABLET ORAL at 12:03

## 2023-03-31 RX ADMIN — PROPOFOL 100 MG: 10 INJECTION, EMULSION INTRAVENOUS at 07:03

## 2023-03-31 RX ADMIN — DAPTOMYCIN 400 MG: 350 INJECTION, POWDER, LYOPHILIZED, FOR SOLUTION INTRAVENOUS at 09:03

## 2023-03-31 RX ADMIN — ROCURONIUM BROMIDE 20 MG: 10 INJECTION, SOLUTION INTRAVENOUS at 08:03

## 2023-03-31 RX ADMIN — CLOPIDOGREL BISULFATE 75 MG: 75 TABLET ORAL at 12:03

## 2023-03-31 RX ADMIN — PROPOFOL 30 MG: 10 INJECTION, EMULSION INTRAVENOUS at 08:03

## 2023-03-31 RX ADMIN — DULOXETINE 30 MG: 30 CAPSULE, DELAYED RELEASE ORAL at 08:03

## 2023-03-31 RX ADMIN — CARVEDILOL 12.5 MG: 12.5 TABLET, FILM COATED ORAL at 08:03

## 2023-03-31 RX ADMIN — OXYCODONE HYDROCHLORIDE 10 MG: 10 TABLET ORAL at 08:03

## 2023-03-31 RX ADMIN — INSULIN ASPART 8 UNITS: 100 INJECTION, SOLUTION INTRAVENOUS; SUBCUTANEOUS at 06:03

## 2023-03-31 RX ADMIN — AMLODIPINE BESYLATE 10 MG: 10 TABLET ORAL at 12:03

## 2023-03-31 RX ADMIN — ALBUTEROL SULFATE 2 PUFF: 108 INHALANT RESPIRATORY (INHALATION) at 08:03

## 2023-03-31 RX ADMIN — ASPIRIN 81 MG: 81 TABLET, COATED ORAL at 12:03

## 2023-03-31 RX ADMIN — CEFEPIME 2 G: 2 INJECTION, POWDER, FOR SOLUTION INTRAVENOUS at 03:03

## 2023-03-31 RX ADMIN — INSULIN ASPART 6 UNITS: 100 INJECTION, SOLUTION INTRAVENOUS; SUBCUTANEOUS at 12:03

## 2023-03-31 RX ADMIN — HEPARIN SODIUM 14 UNITS/KG/HR: 10000 INJECTION, SOLUTION INTRAVENOUS at 04:03

## 2023-03-31 RX ADMIN — SODIUM CHLORIDE: 0.9 INJECTION, SOLUTION INTRAVENOUS at 07:03

## 2023-03-31 NOTE — PROGRESS NOTES
Danny Dunbar MD [facility]                         03/31/2023    HPI:  Nelsy Marino is a 82 y.o. female with   Patient Active Problem List   Diagnosis    Lichen sclerosus et atrophicus    PVD (peripheral vascular disease)    Essential hypertension    Type 2 diabetes mellitus with chronic kidney disease, with long-term current use of insulin    Diastolic CHF, chronic    Obesity (BMI 30-39.9)    Critical right lower limb ischemia    Acute hypoxemic respiratory failure    History of DVT (deep vein thrombosis)    Wound dehiscence    Fluid collection at surgical site    Cellulitis    Postoperative seroma of musculoskeletal structure after non-musculoskeletal procedure    Abscess of right groin    Critical limb ischemia with history of revascularization of same extremity    Sepsis    Hyponatremia    Vascular graft infection    S/P transmetatarsal amputation of foot, right    Leukocytosis    Acute encephalopathy    Bilious vomiting    Upper GI bleed    Diabetic foot infection    Melena    Eschar of foot    being managed by PCP and specialists who is here today for evaluation of RLE pain.  She is s/p fem-fem approx 20 yrs ago without issues until recently.  Fem-fem that was performed for RLE ALI was noted to be occluded on US at [facility].  Was scheduled for an angiogram at [facility] although was unable to proceed due to insurance purposes.  Patient states location is R calf and thigh occurring for 2 mo.  Associated signs and symptoms include none.  Quality is throbbing and severity is 9/10 at worst.  Symptoms began 2 mo ago.  Alleviating factors include rest.  Worsening factors include ambulation.  Pt states RLE claudication x 1 block.  States it is lifestyle limiting.     no MI  no Stroke  Tobacco use: quit 35 yrs ago    1/14/19: cont to c/o RLE pain, 1/2 block calf claudication.  Intermittent rest pain, no tissue loss.  Compliant with Pletal, has not noticed  significant improvement.    2/14/19: Cont to experience 1/2 block R calf claudication.  No wounds.      4/8/19: s/p R iliofemoral bypass 3/20/19.  C/o R groin inc dehiscence by LUCIUS RN.  No F/C.  States RLE ischemic rest pain resolved, +ambulating better without issues.    4/11/19: Presents for further eval of R abundio inc dehiscence.  No F/C.    4/29/19: No new issues, states took all of PO Abx earlier than rec duration of 2 weeks by ID, no F/C.  States wound healing although has not used soap.  No RLE claudication or rest pain, no wounds to R foot.  States RLE pain resolved.    5/9/19:  On PO Abx.  Denies F/C.  Keeping clean with soap and water now.    5/16/19:  Doing well, keeping inc clean/dry, no complaints, no F/C.  Remains on Abx per ID recs.    5/23/19: No new issues, no F/C.  Remains on Abx.    6/6/19:  No new issues or complaints.  No F/C.  R foot paresthesias resolved.    7/2019:  Occasionally paresthesias R foot.  No claudication.  +ambulation.  No F/C.    10/2019:  No complaints today.  No signs of infection, no claudication.  Ambulating well.  Has resumed daily activities.    01/20/2021:  Patient without complaints today.  Ambulating without claudication.  Wounds have healed.  She is compliant with antibiotics and all of her other medications.    1/2023:  c/o poorly healing R great toenail s/p ingrown toenail surgery.  [facility] has stopped her Abx and ASA.  C/o R foot pain.    2/2023:  CTA complete.  Wound care HH RN.  C/o R foot pain.      3/2023:  s/p L fem-R SFA bypass with RLE PTA 3/2/23.  Presents with c/o LLE pain.      Past Medical History:   Diagnosis Date    CHF (congestive heart failure)     Diabetes mellitus     DVT (deep venous thrombosis)     Hypertension     Miscarriage      Past Surgical History:   Procedure Laterality Date     femoral vascular surgery       ANGIOGRAPHY OF LOWER EXTREMITY Right 2/23/2023    Procedure: Angiogram Extremity Unilateral;  Surgeon: Danny Dunbar MD;   Location: [facility] OR;  Service: Vascular;  Laterality: Right;  RN PHONE PREOP 2/20/2023----CK CONSENT    CREATION OF FEMORAL-FEMORAL ARTERY BYPASS WITH GRAFT Bilateral 3/2/2023    Procedure: CREATION, BYPASS, ARTERIAL, FEMORAL TO FEMORAL, USING GRAFT, LEFT FEMORAL ENDARTERECTOMY, LEFT ILIAC ARTERY STENT PLACEMENT, RIGHT LOWER EXTREMITY ANGIOGRAM AND RIGHT SFA  ANGIOPLASTY;  Surgeon: Danny Dunbar MD;  [facility];  Service: Vascular;  Laterality: Bilateral;   744.47 MGY  175.36 Gycm  Flouro time 20.5 mins      CREATION OF ILIOFEMORAL ARTERY BYPASS Right 3/20/2019    Procedure: CREATION, BYPASS, ARTERIAL, ILIAC TO FEMORAL, RIGHT LOWER EXTREMITY, RIGHT PROFUNDAPLASTY;  Surgeon: Danny Dunbar MD;  Location: [facility] OR;  Service: Vascular;  Laterality: Right;  11:00AM START PER ANNE  RN PREOP 3/13/2019  ACT'S, CELL SAVER, GRAFTS, BOOK WATER---NEED H/P  CONSENT IN AM------HGBA1C    ESOPHAGOGASTRODUODENOSCOPY N/A 3/17/2023    Procedure: EGD (ESOPHAGOGASTRODUODENOSCOPY);  Surgeon: Gee Rodriguez MD;  [facility]  Service: Endoscopy;  Laterality: N/A;    FOOT AMPUTATION THROUGH METATARSAL Right 3/4/2023    Procedure: AMPUTATION, FOOT, TRANSMETATARSAL;  Surgeon: Benjamin Godfrey DPM;  [facility];  Service: Podiatry;  Laterality: Right;    HYSTERECTOMY      ?unsure cervix present    INCISION AND DRAINAGE Right 4/12/2019    Procedure: Incision and Drainage - R groin washout, possible muscle flap;  Surgeon: Danny Dunbar MD;  [facility];  Service: Vascular;  Laterality: Right;  groin washout    INCISION AND DRAINAGE OF GROIN Right 5/3/2019    Procedure: Incision and Drainage R groin;  Surgeon: Danny Dunbar MD;  [facility];  Service: Vascular;  Laterality: Right;     Family History   Problem Relation Age of Onset    Diabetes Father     Hypertension Father      Social History     Socioeconomic History    Marital status:     Tobacco Use    Smoking status: Former     Types: Cigarettes     Quit date:      Years since quittin.2    Smokeless tobacco: Never   Substance and Sexual Activity    Alcohol use: No    Drug use: No    Sexual activity: Never     Social Determinants of Health     Financial Resource Strain: Unknown    Difficulty of Paying Living Expenses: Patient refused   Food Insecurity: Unknown    Worried About Running Out of Food in the Last Year: Patient refused    Ran Out of Food in the Last Year: Patient refused   Transportation Needs: Unknown    Lack of Transportation (Non-Medical): Patient refused   Physical Activity: Unknown    Days of Exercise per Week: Patient refused    Minutes of Exercise per Session: Patient refused   Stress: Unknown    Feeling of Stress : Patient refused   Social Connections: Unknown    Frequency of Communication with Friends and Family: Patient refused    Frequency of Social Gatherings with Friends and Family: Patient refused    Attends Anabaptism Services: Patient refused    Active Member of Clubs or Organizations: Patient refused    Attends Club or Organization Meetings: Patient refused    Marital Status: Patient refused   Housing Stability: Unknown    Unable to Pay for Housing in the Last Year: Patient refused    Unstable Housing in the Last Year: Patient refused     No current facility-administered medications for this visit.  No current outpatient medications on file.    Facility-Administered Medications Ordered in Other Visits:     0.9%  NaCl infusion (for blood administration), , Intravenous, Q24H PRN, Dusty Marcus MD    0.9%  NaCl infusion, , Intravenous, Continuous PRN, Damion Khan MD, New Bag at 23 0719    amLODIPine tablet 10 mg, 10 mg, Oral, Daily, Dusty Marcus MD, 10 mg at 23 1018    ammonium lactate 12 % lotion, , Topical (Top), Daily, Lalo Pollock MD    aspirin EC tablet 81 mg, 81 mg, Oral, Daily, Dusty Marcus MD, 81 mg at  03/30/23 1020    atorvastatin tablet 40 mg, 40 mg, Oral, QHS, Dusty Marcus MD, 40 mg at 03/30/23 2100    carvediloL tablet 12.5 mg, 12.5 mg, Oral, BID, Dusty Marcus MD, 12.5 mg at 03/30/23 2100    clopidogreL tablet 75 mg, 75 mg, Oral, Daily, Dusty Marcus MD, 75 mg at 03/30/23 1020    DAPTOmycin (CUBICIN) 400 mg in sodium chloride 0.9% SolP 50 mL IVPB, 6 mg/kg, Intravenous, Q24H, Liza Barrera PA-C, Stopped at 03/30/23 1924    dexAMETHasone injection, , Intravenous, PRN, Damion Khan MD, 4 mg at 03/31/23 0729    dextrose 10% bolus 125 mL 125 mL, 12.5 g, Intravenous, PRN, Dusty Marcus MD    DULoxetine DR capsule 30 mg, 30 mg, Oral, BID, Dusty Marcus MD, 30 mg at 03/30/23 2100    fentaNYL injection, , Intravenous, PRN, Damion Khan MD, 50 mcg at 03/31/23 0723    furosemide tablet 20 mg, 20 mg, Oral, Daily, Dusty Marcus MD, 20 mg at 03/30/23 1017    glucagon (human recombinant) injection 1 mg, 1 mg, Intramuscular, PRN, Dusty Marcus MD    heparin 25,000 units in dextrose 5% (100 units/ml) IV bolus from bag - ADDITIONAL PRN BOLUS - 30 units/kg, 30 Units/kg (Adjusted), Intravenous, PRN, Dusty Marcus MD    heparin 25,000 units in dextrose 5% (100 units/ml) IV bolus from bag - ADDITIONAL PRN BOLUS - 60 units/kg, 60 Units/kg (Adjusted), Intravenous, PRN, Dusty Marcus MD    heparin 25,000 units in dextrose 5% 250 mL (100 units/mL) infusion HIGH INTENSITY nomogram - [facility], 0-40 Units/kg/hr (Adjusted), Intravenous, Continuous, Dusty Marcus MD, Last Rate: 8.3 mL/hr at 03/31/23 0403, 14 Units/kg/hr at 03/31/23 0403    insulin aspart U-100 pen 1-10 Units, 1-10 Units, Subcutaneous, Q6H PRN, Dusty Marcus MD    LIDOcaine (cardiac) injection, , Intravenous, PRN, Damion Khan MD, 80 mg at 03/31/23 0723    ondansetron injection 4 mg, 4 mg, Intravenous, Q8H PRN, Dusty Marcus MD    oxyCODONE immediate release  tablet 5 mg, 5 mg, Oral, Q4H PRN, Dusty Marcus MD, 5 mg at 03/31/23 0024    pantoprazole EC tablet 40 mg, 40 mg, Oral, BID, Dusty Marcus MD, 40 mg at 03/30/23 2100    propofol (DIPRIVAN) 10 mg/mL infusion, , Intravenous, PRN, Damion Khan MD, 100 mg at 03/31/23 0723    rocuronium injection, , Intravenous, PRN, Damion Khan MD, 40 mg at 03/31/23 0723    sodium chloride 0.9% flush 10 mL, 10 mL, Intravenous, PRN, Dusty Marcus MD    REVIEW OF SYSTEMS:  General: No fevers or chills; ENT: No sore throat; Allergy and Immunology: no persistent infections; Hematological and Lymphatic: No history of bleeding or easy bruising; Endocrine: negative; Respiratory: no cough, shortness of breath, or wheezing; Cardiovascular: no chest pain or dyspnea on exertion; Gastrointestinal: no abdominal pain/back, change in bowel habits, or bloody stools; Genito-Urinary: no dysuria, trouble voiding, or hematuria; Musculoskeletal: negative; Neurological: no TIA or stroke symptoms; Psychiatric: no nervousness, anxiety or depression.    PHYSICAL EXAM:      Pulse: 87         General appearance:  Alert, well-appearing, and in no distress.  Oriented to person, place, and time                    Neurological: Normal speech, no focal findings noted; CN II - XII grossly intact. RLE with sensation to light touch, LLE with sensation to light touch.            Musculoskeletal: Digits/nail without cyanosis/clubbing.  Strength 5/5 BLE.                    Neck: Supple, no significant adenopathy                  Chest:  Clear to auscultation, no wheezes, rales or rhonchi, symmetric air entry. No use of accessory muscles               Cardiac: Normal rate and regular rhythm, S1 and S2 normal            Abdomen: Soft, nontender, nondistended, no masses or organomegaly, no hernia     No rebound tenderness noted; bowel sounds normal     R groin inc healed, no erythema      Extremities:   Unable to palpate R femoral pulse, 2+ L  femoral pulse     doppler+ R popliteal pulse, doppler+ L popliteal pulse     doppler+ R PT pulse, doppler+ L PT pulse     2+ R DP pulse, doppler+ L DP pulse     no RLE edema, no LLE edema    Skin: RLE without tissue loss; LLE without tissue loss, dehiscence of L femoral incision without local signs of infection    LAB RESULTS:  No results found for: CBC  Lab Results   Component Value Date    LABPROT 12.5 03/29/2023    INR 1.2 03/29/2023     Lab Results   Component Value Date     03/29/2023    K 4.8 03/29/2023     03/29/2023    CO2 24 03/29/2023     (H) 03/29/2023    BUN 46 (H) 03/29/2023    CREATININE 1.2 03/29/2023    CALCIUM 9.1 03/29/2023    ANIONGAP 14 03/29/2023    EGFRNONAA 48 (A) 10/18/2019     Lab Results   Component Value Date    WBC 9.55 03/30/2023    RBC 2.63 (L) 03/30/2023    HGB 6.3 (L) 03/30/2023    HCT 22.0 (L) 03/30/2023    MCV 84 03/30/2023    MCH 24.0 (L) 03/30/2023    MCHC 28.6 (L) 03/30/2023    RDW 16.2 (H) 03/30/2023     03/30/2023    MPV 10.5 03/30/2023    GRAN 7.0 03/30/2023    GRAN 73.3 (H) 03/30/2023    LYMPH 1.7 03/30/2023    LYMPH 17.8 (L) 03/30/2023    MONO 0.4 03/30/2023    MONO 4.6 03/30/2023    EOS 0.4 03/30/2023    BASO 0.03 03/30/2023    EOSINOPHIL 3.7 03/30/2023    BASOPHIL 0.3 03/30/2023    DIFFMETHOD Automated 03/30/2023     .  Lab Results   Component Value Date    HGBA1C 8.2 (H) 02/28/2023       IMAGING:  All pertinent imaging has been reviewed and interpreted independently.    11/8/18: US showing occluded fem-fem, decreased velocity R CFA, HD significant stenosis R SFA    [facility] ARCHIE: 0.4/1    1/14/19: ARCHIE R 0.4/1.1, flat L toe waveform, R toe pressure 64 mmHg    2/2019: Stress test without evidence of ischemia.  Echo with normal function.    4/24/19:   ARCHIE   1. Right lower extremity pressures and waveforms indicate mild arterial occlusive disease.  2. Left lower extremity pressures and waveforms indicate no hemodynamically significant arterial occlusive  disease.    BLE arterial US  1. Right lower extremity arterial ultrasound and pressures indicate no hemodynamically significant stenosis.  The right iliac artery to femoral artery bypass is patent without evidence of hemodynamically significant stenosis.    2. Left lower extremity arterial ultrasound shows an occluded left external iliac artery.  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    CTA runoff 4/24/2019:  Patient is postop right iliac to femoral artery bypass.  There is some soft tissue stranding along the iliac graft similar to slightly increased compared to prior.  May be some narrowing at the origin of the graft.  Dilatation of the insertion may be related to surgical technique or pseudoaneurysm.  Correlation with operative procedure will be helpful.  There is decrease in the volume of fluid though a focal 1 cm collection does persist.  There is some increased soft tissue thickening about the insertion site as well as the skin.  Skin staples remain.  Occluded fem-fem bypass graft.  Significant vascular disease throughout the SFA and calf vessels bilaterally similar.  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  Cholelithiasis.    CTA 6/24/19:  Patent right iliac to femoral bypass graft and occluded femoral-femoral bypass graft.  Mild aneurysmal dilatation at the distal anastomosis, unchanged.  Patent lower extremity arterial system with scattered moderate/ high-grade stenosis throughout, as above.    Arterial US 6/24/19:  1. Right lower extremity arterial ultrasound shows a patent iliac to femoral artery bypass with distal anastomosis hemodynamically significant stenosis.  There is mid SFA stenosis.  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  Pressures indicate mild arterial occlusive disease.    ARCHIE 6/24/19:  1. Right lower extremity pressures and waveforms indicate mild arterial occlusive disease.  2. Left lower  extremity pressures and waveforms indicate no hemodynamically significant arterial occlusive disease.  Ankle pressures are non-compressible, suggesting possible medial calcinosis.    BLE arterial US 10/2019:  1. Right lower extremity arterial ultrasound shows no hemodynamically significant stenosis in the iliofemoral bypass.  There is hemodynamically significant stenosis in the mid SFA.  Right femoral anastomosis measures 1.7 x 1.6 cm.  Pressures indicate mild arterial occlusive disease.  2. Left lower extremity arterial ultrasound shows an occluded femoral-femoral bypass. Pressures indicate mild arterial occlusive disease.    11/2020:  1. Right lower extremity arterial ultrasound shows a patent right iliac to femoral bypass graft with approximately 50% stenosis of the distal anastomosis.  There is hemodynamically significant stenosis of the mid SFA.  Pressures indicate no arterial occlusive disease.  The femoral anastomosis measures 1.8 x 1.6 x 1.7 cm.    CTA 2/2023:  R iliofemoral bypass occluded.    IMP/PLAN:  82 y.o. female with   Patient Active Problem List   Diagnosis    Lichen sclerosus et atrophicus    PVD (peripheral vascular disease)    Essential hypertension    Type 2 diabetes mellitus with chronic kidney disease, with long-term current use of insulin    Diastolic CHF, chronic    Obesity (BMI 30-39.9)    Critical right lower limb ischemia    Acute hypoxemic respiratory failure    History of DVT (deep vein thrombosis)    Wound dehiscence    Fluid collection at surgical site    Cellulitis    Postoperative seroma of musculoskeletal structure after non-musculoskeletal procedure    Abscess of right groin    Critical limb ischemia with history of revascularization of same extremity    Sepsis    Hyponatremia    Vascular graft infection    S/P transmetatarsal amputation of foot, right    Leukocytosis    Acute encephalopathy    Bilious vomiting    Upper GI bleed    Diabetic foot infection    Melena    Eschar of  foot    being managed by PCP and specialists who is here today for evaluation of RLE claudication, lifestyle limiting s/p R iliofemoral bypass 3/2019 with groin incision dehisence and superficial infection / deep seroma with graft occlusion on CTA 2/2023, symptoms of graft occlusion 2022    -Recovering well s/p R iliofemoral bypass s/p wound washout and drainage of seroma with healing skin dehiscence of R groin inc at superior and mid aspect, no graft exposure or fascial dehiscence, 2+ DP with resolution of claudication and ischemic rest pain, now with healing wounds s/p R groin I&D of distal inc superficial skin infection and removal of embedded staple with improvement in wound and no further infection with wound healed now and graft patent with ARCHIE 0.7/0.9 with resolved claudication/rest pain with decreased R femoral pulse and concern for bypass occlusion with poorly healing R great toe s/p L-R CFA to SFA bypass and RLE PTA 3/2/23 with L femoral incision dehiscence and LLE pain - rec ER admission for pain control and L groin wound washout/debridement  -Cont ASA and Pletal, and Lipitor  -Continue heart healthy lifestyle  -Cont Abx per ID recs for suppressive therapy   -Cont smoking cessation      I spent 11 minutes evaluating this patient and greater than 50% of the time was spent counseling, coordinator care and discussing the plan of care.  All questions were answered and patient stated understanding with agreement with the above treatment plan.    Danny Dunbar MD [facility]  Vascular and Endovascular Surgery

## 2023-03-31 NOTE — NURSING
Notified On call Surgical team that blood would now be infused. See Flowsheet.     Pt lying on right side. Aox4. VSS. Afebrile.PT RA.  in the room.   Heparin infusing. Aptt is theraputic at this time. Will follow up with recheck in 6 hours.  Plan of care discussed.

## 2023-03-31 NOTE — NURSING
Pt was slightly confused throughout the night. Was unable to sleep for longer than a few minutes at a time due to medical interventions throughout night. Patient refused BP medication during night. See Mar Med given for slightly elevated BP at this time. Checklist noted .  in room to sign consents if needed.

## 2023-03-31 NOTE — BRIEF OP NOTE
[facility] - Surgery ([facility])  Brief Operative Note    SUMMARY     Surgery Date: 3/31/2023     Surgeon(s) and Role:  Panel 1:     * René Simms MD - Primary     * Freddy Murillo MD - Resident - Assisting  Panel 2:     * Brandon Smiley MD - Primary     * Gregory Ireland MD - Fellow        Pre-op Diagnosis:  Critical limb ischemia with history of revascularization of same extremity [I70.229, Z98.890]    Post-op Diagnosis:  Post-Op Diagnosis Codes:     * Critical limb ischemia with history of revascularization of same extremity [I70.229, Z98.890]    Procedure(s) (LRB):  DEBRIDEMENT, WOUND (Left)  CREATION, FLAP, MUSCLE ROTATION (Left)    Anesthesia: General    Operative Findings: Left groin exploration with dacron graft exposed at wound bed. Cultures obtained and sent for microbiologic review. Intra-operative consult to plastic surgery and room turned over to Dr. Smiley' team for rectus femoris muscle flap. WILTON drain to left lateral lower extremity.     Estimated Blood Loss: 20mL    Estimated Blood Loss has been documented.         Specimens:   Specimen (24h ago, onward)      None            [id number]

## 2023-03-31 NOTE — ASSESSMENT & PLAN NOTE
Nelsy Marino is a 82 y.o. woman with history of HTN, T2DM, DVT, and PAD who underwent left to right fem to SFA bypass and right SFA angioplasty on 3/2/23 and was admitted until 3/20. She was seen by Dr. Dunbar in the office earlier today complaining of left rest pain and also found to have dehiscence of her left groin incision.      - OR this morning for right groin debridement  - cont NPO  - Heparin gtt  - vanc/zosyn  - Insulin SS  - Home meds  - Asa plavix statin  - Podiatry consult to eval right TMA wound  - ID for chronic graft infection and new possibly infected graft  Will need revascularization of short segment occlusion left CFA

## 2023-03-31 NOTE — ANESTHESIA POSTPROCEDURE EVALUATION
Anesthesia Post Evaluation    Patient: Nelsy Marino    Procedure(s) Performed: Procedure(s) (LRB):  DEBRIDEMENT, WOUND (Left)  CREATION, FLAP, MUSCLE ROTATION (Left)    Final Anesthesia Type: general      Patient location during evaluation: PACU  Patient participation: Yes- Able to Participate  Level of consciousness: awake and alert and oriented  Post-procedure vital signs: reviewed and stable  Pain management: adequate  Airway patency: patent    PONV status at discharge: No PONV  Anesthetic complications: no      Cardiovascular status: blood pressure returned to baseline and hemodynamically stable  Respiratory status: unassisted, spontaneous ventilation and room air  Hydration status: euvolemic  Follow-up not needed.          Vitals Value Taken Time   /82 03/31/23 1224   Temp 36.9 °C (98.5 °F) 03/31/23 1224   Pulse 88 03/31/23 1224   Resp 20 03/31/23 1254   SpO2 95 % 03/31/23 1224         Event Time   Out of Recovery 03/31/2023 11:23:00         Pain/Ana Maria Score: Pain Rating Prior to Med Admin: 4 (3/31/2023 12:54 PM)  Pain Rating Post Med Admin: 3 (3/30/2023  7:47 AM)  Ana Maria Score: 9 (3/31/2023 11:23 AM)

## 2023-03-31 NOTE — SUBJECTIVE & OBJECTIVE
Medications:  Continuous Infusions:   heparin (porcine) in D5W 14 Units/kg/hr (03/31/23 0403)     Scheduled Meds:   amLODIPine  10 mg Oral Daily    ammonium lactate   Topical (Top) Daily    aspirin  81 mg Oral Daily    atorvastatin  40 mg Oral QHS    carvediloL  12.5 mg Oral BID    clopidogreL  75 mg Oral Daily    DAPTOmycin (CUBICIN) IV (PEDS and ADULTS)  6 mg/kg Intravenous Q24H    DULoxetine  30 mg Oral BID    furosemide  20 mg Oral Daily    pantoprazole  40 mg Oral BID     PRN Meds:sodium chloride, dextrose 10%, glucagon (human recombinant), heparin (PORCINE), heparin (PORCINE), insulin aspart U-100, ondansetron, oxyCODONE, sodium chloride 0.9%     Objective:     Vital Signs (Most Recent):  Temp: 98 °F (36.7 °C) (03/31/23 0353)  Pulse: 82 (03/31/23 0353)  Resp: 16 (03/31/23 0353)  BP: (!) 158/71 (03/31/23 0353)  SpO2: 99 % (03/31/23 0353)   Vital Signs (24h Range):  Temp:  [96.1 °F (35.6 °C)-98.1 °F (36.7 °C)] 98 °F (36.7 °C)  Pulse:  [75-91] 82  Resp:  [16-17] 16  SpO2:  [91 %-99 %] 99 %  BP: (112-158)/(55-71) 158/71         Physical Exam  Constitutional:       General: She is not in acute distress.     Appearance: She is not ill-appearing.   Cardiovascular:      Rate and Rhythm: Normal rate.      Comments: Right: DP Triphasic signal, PT Biphasic signal  Left: DP monophasic signal, PT monophasic signal  Pulmonary:      Effort: Pulmonary effort is normal. No respiratory distress.   Skin:     Comments: Left groin incision with nylon sutures. Dehisced with fibrinous material    Neurological:      Mental Status: She is alert.       Significant Labs:  CBC:   Recent Labs   Lab 03/30/23  0342   WBC 9.55   RBC 2.63*   HGB 6.3*   HCT 22.0*      MCV 84   MCH 24.0*   MCHC 28.6*       CMP:   Recent Labs   Lab 03/29/23  2322   *   CALCIUM 9.1   ALBUMIN 2.4*   PROT 7.5      K 4.8   CO2 24      BUN 46*   CREATININE 1.2   ALKPHOS 64   ALT 8*   AST 20   BILITOT 0.5         Significant Diagnostics:  I  have reviewed all pertinent imaging results/findings within the past 24 hours.

## 2023-03-31 NOTE — ANESTHESIA PROCEDURE NOTES
Intubation    Date/Time: 3/31/2023 7:28 AM  Performed by: Damion Khan MD  Authorized by: Ursula Leos MD     Intubation:     Induction:  Intravenous    Intubated:  Postinduction    Mask Ventilation:  Easy with oral airway    Attempts:  1    Attempted By:  Staff anesthesiologist    Method of Intubation:  Direct    Blade:  Mendez 2    Laryngeal View Grade: Grade I - full view of cords      Difficult Airway Encountered?: No      Complications:  None    Airway Device:  Oral endotracheal tube    Airway Device Size:  7.0    Style/Cuff Inflation:  Cuffed (inflated to minimal occlusive pressure)    Secured at:  The lips    Placement Verified By:  Capnometry    Complicating Factors:  None    Findings Post-Intubation:  BS equal bilateral

## 2023-03-31 NOTE — NURSING TRANSFER
Nursing Transfer Note      3/31/2023     Reason patient is being transferred: post procedure     Transfer To: 1004    Transfer via stretcher    Transported by transport and PCT     Medicines sent: yes     Any special needs or follow-up needed: wound vac in place     Chart send with patient: Yes    Notified: spouse

## 2023-03-31 NOTE — TRANSFER OF CARE
"Anesthesia Transfer of Care Note    Patient: Nelsy Marino    Procedure(s) Performed: Procedure(s) (LRB):  DEBRIDEMENT, WOUND (Left)  CREATION, FLAP, MUSCLE ROTATION (Left)    Patient location: PACU    Anesthesia Type: general    Transport from OR: Transported from OR on 6-10 L/min O2 by face mask with adequate spontaneous ventilation    Post pain: adequate analgesia    Post assessment: no apparent anesthetic complications    Post vital signs: stable    Level of consciousness: awake    Nausea/Vomiting: no nausea/vomiting    Complications: none    Transfer of care protocol was followed      Last vitals:   Visit Vitals  /86   Pulse 85   Temp 36.1 °C (97 °F) (Temporal)   Resp 20   Ht 5' 4" (1.626 m)   Wt 67 kg (147 lb 11.3 oz)   LMP  (LMP Unknown)   SpO2 99%   Breastfeeding No   BMI 25.35 kg/m²     "

## 2023-03-31 NOTE — OP NOTE
Date of surgery 03/31/2023   Preoperative diagnosis left groin wound with exposed vascular graft   Postoperative diagnosis is the same  Procedure performed  1. Excisional debridement of left groin wound including skin subcutaneous tissue and superficial fascia measuring approximately 1 10 cm times 8 cm x 3 cm for a total debridement of 240 cm  2. Rectus femoris muscle flap to the left groin wound  3. Advancement flap closure of the skin measuring 20 cm on each side measuring approximately 40 cm.  4. Placement of wound VAC  5. Emergent intraoperative consultation   Surgeon Sherice Edwards general  Complications none   Blood loss minimal  Drains x1    I was emergently consulted to the operating room for an exposed left groin vascular graft.  After vascular surgery finished their portion I entered the room.  It was exposed prosthetic graft the base of the wound.  There was nonviable fat sacrum surrounding it as well as nonviable skin.  The entire skin and subcutaneous fat superficial fascia was all debrided circumferentially around the wound.  Next the wound was irrigated with copious amounts of saline followed by Vashe solution per 0 next, 2 small incisions were made in the anterior thigh and the rectus femoris muscle was harvested and passed through a subcutaneous tunnel up into the groin.  The rectus femoris muscle was then used to completely cover the vascular graft it was sutured down using 2-0 Vicryl.  The fascia was removed from the rectus femoris muscle.  Advancement flaps of skin and subcutaneous tissue were then performed each side the wound measured 10 cm in length x 2 cm in advancement for a total 40 cm the dermis was then sutured to the muscle in a running fashion using 2-0 Vicryl sutures.  Exposed muscle was left.  This will be skin grafted at a later date a wound VAC was placed.

## 2023-04-01 LAB
ALBUMIN SERPL BCP-MCNC: 2.4 G/DL (ref 3.5–5.2)
ALP SERPL-CCNC: 65 U/L (ref 55–135)
ALT SERPL W/O P-5'-P-CCNC: 7 U/L (ref 10–44)
ANION GAP SERPL CALC-SCNC: 13 MMOL/L (ref 8–16)
APTT PPP: 110.3 SEC (ref 21–32)
APTT PPP: 32.8 SEC (ref 21–32)
APTT PPP: 37 SEC (ref 21–32)
APTT PPP: 66.1 SEC (ref 21–32)
AST SERPL-CCNC: 16 U/L (ref 10–40)
BASOPHILS # BLD AUTO: 0.02 K/UL (ref 0–0.2)
BASOPHILS NFR BLD: 0.2 % (ref 0–1.9)
BILIRUB SERPL-MCNC: 0.6 MG/DL (ref 0.1–1)
BUN SERPL-MCNC: 31 MG/DL (ref 8–23)
CALCIUM SERPL-MCNC: 8.8 MG/DL (ref 8.7–10.5)
CHLORIDE SERPL-SCNC: 102 MMOL/L (ref 95–110)
CO2 SERPL-SCNC: 26 MMOL/L (ref 23–29)
CREAT SERPL-MCNC: 1.1 MG/DL (ref 0.5–1.4)
DIFFERENTIAL METHOD: ABNORMAL
EOSINOPHIL # BLD AUTO: 0.1 K/UL (ref 0–0.5)
EOSINOPHIL NFR BLD: 0.8 % (ref 0–8)
ERYTHROCYTE [DISTWIDTH] IN BLOOD BY AUTOMATED COUNT: 16 % (ref 11.5–14.5)
EST. GFR  (NO RACE VARIABLE): 50.2 ML/MIN/1.73 M^2
GLUCOSE SERPL-MCNC: 199 MG/DL (ref 70–110)
HCT VFR BLD AUTO: 30.1 % (ref 37–48.5)
HGB BLD-MCNC: 9.4 G/DL (ref 12–16)
IMM GRANULOCYTES # BLD AUTO: 0.05 K/UL (ref 0–0.04)
IMM GRANULOCYTES NFR BLD AUTO: 0.4 % (ref 0–0.5)
LYMPHOCYTES # BLD AUTO: 1.6 K/UL (ref 1–4.8)
LYMPHOCYTES NFR BLD: 12.4 % (ref 18–48)
MCH RBC QN AUTO: 26.7 PG (ref 27–31)
MCHC RBC AUTO-ENTMCNC: 31.2 G/DL (ref 32–36)
MCV RBC AUTO: 86 FL (ref 82–98)
MONOCYTES # BLD AUTO: 0.8 K/UL (ref 0.3–1)
MONOCYTES NFR BLD: 6 % (ref 4–15)
NEUTROPHILS # BLD AUTO: 10.6 K/UL (ref 1.8–7.7)
NEUTROPHILS NFR BLD: 80.2 % (ref 38–73)
NRBC BLD-RTO: 0 /100 WBC
PLATELET # BLD AUTO: 400 K/UL (ref 150–450)
PMV BLD AUTO: 10.4 FL (ref 9.2–12.9)
POCT GLUCOSE: 199 MG/DL (ref 70–110)
POCT GLUCOSE: 217 MG/DL (ref 70–110)
POCT GLUCOSE: 226 MG/DL (ref 70–110)
POCT GLUCOSE: 261 MG/DL (ref 70–110)
POCT GLUCOSE: 282 MG/DL (ref 70–110)
POCT GLUCOSE: 313 MG/DL (ref 70–110)
POTASSIUM SERPL-SCNC: 4.1 MMOL/L (ref 3.5–5.1)
PROT SERPL-MCNC: 7.3 G/DL (ref 6–8.4)
RBC # BLD AUTO: 3.52 M/UL (ref 4–5.4)
SODIUM SERPL-SCNC: 141 MMOL/L (ref 136–145)
WBC # BLD AUTO: 13.18 K/UL (ref 3.9–12.7)

## 2023-04-01 PROCEDURE — 25000003 PHARM REV CODE 250: Performed by: STUDENT IN AN ORGANIZED HEALTH CARE EDUCATION/TRAINING PROGRAM

## 2023-04-01 PROCEDURE — 63600175 PHARM REV CODE 636 W HCPCS: Performed by: STUDENT IN AN ORGANIZED HEALTH CARE EDUCATION/TRAINING PROGRAM

## 2023-04-01 PROCEDURE — 85730 THROMBOPLASTIN TIME PARTIAL: CPT | Performed by: STUDENT IN AN ORGANIZED HEALTH CARE EDUCATION/TRAINING PROGRAM

## 2023-04-01 PROCEDURE — 36415 COLL VENOUS BLD VENIPUNCTURE: CPT | Performed by: SURGERY

## 2023-04-01 PROCEDURE — 80053 COMPREHEN METABOLIC PANEL: CPT | Performed by: STUDENT IN AN ORGANIZED HEALTH CARE EDUCATION/TRAINING PROGRAM

## 2023-04-01 PROCEDURE — 25000003 PHARM REV CODE 250: Performed by: SURGERY

## 2023-04-01 PROCEDURE — 63600175 PHARM REV CODE 636 W HCPCS: Performed by: SURGERY

## 2023-04-01 PROCEDURE — 85730 THROMBOPLASTIN TIME PARTIAL: CPT | Mod: 91 | Performed by: SURGERY

## 2023-04-01 PROCEDURE — 85025 COMPLETE CBC W/AUTO DIFF WBC: CPT | Performed by: STUDENT IN AN ORGANIZED HEALTH CARE EDUCATION/TRAINING PROGRAM

## 2023-04-01 PROCEDURE — 20600001 HC STEP DOWN PRIVATE ROOM

## 2023-04-01 RX ADMIN — INSULIN ASPART 3 UNITS: 100 INJECTION, SOLUTION INTRAVENOUS; SUBCUTANEOUS at 10:04

## 2023-04-01 RX ADMIN — CARVEDILOL 12.5 MG: 12.5 TABLET, FILM COATED ORAL at 08:04

## 2023-04-01 RX ADMIN — ATORVASTATIN CALCIUM 40 MG: 40 TABLET, FILM COATED ORAL at 10:04

## 2023-04-01 RX ADMIN — PANTOPRAZOLE SODIUM 40 MG: 40 TABLET, DELAYED RELEASE ORAL at 08:04

## 2023-04-01 RX ADMIN — DULOXETINE 30 MG: 30 CAPSULE, DELAYED RELEASE ORAL at 08:04

## 2023-04-01 RX ADMIN — CEFEPIME 2 G: 2 INJECTION, POWDER, FOR SOLUTION INTRAVENOUS at 01:04

## 2023-04-01 RX ADMIN — INSULIN ASPART 4 UNITS: 100 INJECTION, SOLUTION INTRAVENOUS; SUBCUTANEOUS at 08:04

## 2023-04-01 RX ADMIN — CLOPIDOGREL BISULFATE 75 MG: 75 TABLET ORAL at 08:04

## 2023-04-01 RX ADMIN — HEPARIN SODIUM 20 UNITS/KG/HR: 10000 INJECTION, SOLUTION INTRAVENOUS at 02:04

## 2023-04-01 RX ADMIN — OXYCODONE HYDROCHLORIDE 10 MG: 10 TABLET ORAL at 08:04

## 2023-04-01 RX ADMIN — ASPIRIN 81 MG: 81 TABLET, COATED ORAL at 08:04

## 2023-04-01 RX ADMIN — DAPTOMYCIN 400 MG: 350 INJECTION, POWDER, LYOPHILIZED, FOR SOLUTION INTRAVENOUS at 04:04

## 2023-04-01 RX ADMIN — OXYCODONE HYDROCHLORIDE 10 MG: 10 TABLET ORAL at 01:04

## 2023-04-01 RX ADMIN — AMLODIPINE BESYLATE 10 MG: 10 TABLET ORAL at 08:04

## 2023-04-01 RX ADMIN — CEFEPIME 2 G: 2 INJECTION, POWDER, FOR SOLUTION INTRAVENOUS at 02:04

## 2023-04-01 RX ADMIN — FUROSEMIDE 20 MG: 20 TABLET ORAL at 08:04

## 2023-04-01 RX ADMIN — PANTOPRAZOLE SODIUM 40 MG: 40 TABLET, DELAYED RELEASE ORAL at 10:04

## 2023-04-01 RX ADMIN — CARVEDILOL 12.5 MG: 12.5 TABLET, FILM COATED ORAL at 10:04

## 2023-04-01 RX ADMIN — HEPARIN SODIUM 16 UNITS/KG/HR: 10000 INJECTION, SOLUTION INTRAVENOUS at 06:04

## 2023-04-01 RX ADMIN — INSULIN ASPART 1 UNITS: 100 INJECTION, SOLUTION INTRAVENOUS; SUBCUTANEOUS at 01:04

## 2023-04-01 RX ADMIN — Medication: at 08:04

## 2023-04-01 RX ADMIN — OXYCODONE HYDROCHLORIDE 10 MG: 10 TABLET ORAL at 07:04

## 2023-04-01 RX ADMIN — DULOXETINE 30 MG: 30 CAPSULE, DELAYED RELEASE ORAL at 10:04

## 2023-04-01 NOTE — PLAN OF CARE
Problem: Adult Inpatient Plan of Care  Goal: Plan of Care Review  Outcome: Ongoing, Progressing  Goal: Patient-Specific Goal (Individualized)  Outcome: Ongoing, Progressing  Goal: Absence of Hospital-Acquired Illness or Injury  Outcome: Ongoing, Progressing  Goal: Optimal Comfort and Wellbeing  Outcome: Ongoing, Progressing  Goal: Readiness for Transition of Care  Outcome: Ongoing, Progressing     Problem: Diabetes Comorbidity  Goal: Blood Glucose Level Within Targeted Range  Outcome: Ongoing, Progressing     Problem: Adjustment to Illness (Sepsis/Septic Shock)  Goal: Optimal Coping  Outcome: Ongoing, Progressing     Problem: Bleeding (Sepsis/Septic Shock)  Goal: Absence of Bleeding  Outcome: Ongoing, Progressing     Problem: Glycemic Control Impaired (Sepsis/Septic Shock)  Goal: Blood Glucose Level Within Desired Range  Outcome: Ongoing, Progressing     Problem: Infection Progression (Sepsis/Septic Shock)  Goal: Absence of Infection Signs and Symptoms  Outcome: Ongoing, Progressing     Problem: Nutrition Impaired (Sepsis/Septic Shock)  Goal: Optimal Nutrition Intake  Outcome: Ongoing, Progressing     Problem: Skin Injury Risk Increased  Goal: Skin Health and Integrity  Outcome: Ongoing, Progressing     Problem: Fall Injury Risk  Goal: Absence of Fall and Fall-Related Injury  Outcome: Ongoing, Progressing

## 2023-04-01 NOTE — ASSESSMENT & PLAN NOTE
Nelsy Marino is a 82 y.o. woman with history of HTN, T2DM, DVT, and PAD who underwent left to right fem to SFA bypass and right SFA angioplasty on 3/2/23 and was admitted until 3/20. She was seen by Dr. Dunbar in the office earlier today complaining of left rest pain and also found to have dehiscence of her left groin incision.  S/p wound washout and flip with plastics 3/31    - bed rest and wound vac on at all times, per plastics  - cont heparin gtt  - ID following. cont dapto and cefepime     - f/u OR wound cultures. Gram stain no organisms   - cardiac and diabetic diet. boosts   - Insulin SS  - Home meds  - Asa plavix statin  - Podiatry consulted for right TMA wound    Plan for OR Monday (4/3) for endo revascularization of short segment occlusion left CFA  - to perform dylan's test  - to be consent and marked today  - NPO MN tonight 4/2

## 2023-04-01 NOTE — ASSESSMENT & PLAN NOTE
Nelsy Marino is a 82 y.o. woman with history of HTN, T2DM, DVT, and PAD who underwent left to right fem to SFA bypass and right SFA angioplasty on 3/2/23 and was admitted until 3/20. She was seen by Dr. Dunbar in the office earlier today complaining of left rest pain and also found to have dehiscence of her left groin incision.  S/p wound washout and flip with plastics 3/31    - bed rest and wound vac on at all times, per plastics  - cont heparin gtt  - ID following. cont dapto and cefepime     - f/u OR wound cultures   - cardiac and diabetic diet. Add boosts   - Insulin SS  - Home meds  - Asa plavix statin  - Podiatry consulted for right TMA wound  Plan for OR Monday for endo revascularization of short segment occlusion left CFA  - needs consent and marked

## 2023-04-01 NOTE — PROGRESS NOTES
Plastic and Reconstructive Surgery   Progress Note    Subjective:      Patient seen and examined at bedside. No acute event overnight. Patient s/p left groin wound exploration, debridement, washout and left rectus femoris muscle flap POD#1. Patient complains of left lower leg pain. Patient eating a little but doesn't have a big appetite. Sister at bedside, informed her and patient about potential skin graft.    Objective:  Vital signs in last 24 hours:  Temp:  [97.9 °F (36.6 °C)-98.5 °F (36.9 °C)] 98.5 °F (36.9 °C)  Pulse:  [77-89] 89  Resp:  [14-20] 14  SpO2:  [92 %-96 %] 93 %  BP: (111-156)/(54-65) 156/57    Intake/Output last 3 shifts:  I/O last 3 completed shifts:  In: -   Out: 720 [Urine:650; Drains:20; Other:50]    Intake/Output this shift:  I/O this shift:  In: -   Out: 50 [Other:50]        Physical Exam:  VITAL SIGNS:   Vitals:    23 0751 23 0846 23 1201 23 1245   BP: (!) 120/56  (!) 156/57    BP Location: Left arm  Left arm    Patient Position: Sitting  Lying    Pulse: 87  89    Resp: 14 14 18 14   Temp: 97.9 °F (36.6 °C)  98.5 °F (36.9 °C)    TempSrc: Oral  Oral    SpO2: (!) 92%  (!) 93%    Weight:       Height:         TMAX: Temp (24hrs), Av.3 °F (36.8 °C), Min:97.9 °F (36.6 °C), Max:98.5 °F (36.9 °C)    General: Alert; No acute distress  Cardiovascular: Regular rate   Respiratory: Normal respiratory effort. Chest rise symmetric.   Abdomen: Soft, nontender, nondistended  Extremity: left groin/thigh with wound vac in place  Neurologic: No focal deficit. Speech normal      Scheduled Medications amLODIPine, 10 mg, Daily  ammonium lactate, , Daily  aspirin, 81 mg, Daily  atorvastatin, 40 mg, QHS  carvediloL, 12.5 mg, BID  ceFEPime (MAXIPIME) IVPB, 2 g, Q12H  clopidogreL, 75 mg, Daily  DAPTOmycin (CUBICIN) IV (PEDS and ADULTS), 6 mg/kg, Q24H  DULoxetine, 30 mg, BID  fluconazole, 400 mg, Daily  furosemide, 20 mg, Daily  pantoprazole, 40 mg, BID      PRN Medications sodium chloride,  dextrose 10%, glucagon (human recombinant), heparin (PORCINE), heparin (PORCINE), HYDROmorphone, insulin aspart U-100, ondansetron, oxyCODONE, oxyCODONE, sodium chloride 0.9%    Recent Labs:   Lab Results   Component Value Date    WBC 10.59 04/02/2023    HGB 8.3 (L) 04/02/2023    HCT 27.5 (L) 04/02/2023    MCV 86 04/02/2023     04/02/2023     Lab Results   Component Value Date     (H) 04/02/2023     04/02/2023    K 3.7 04/02/2023     04/02/2023    BUN 30 (H) 04/02/2023         Assessment: 82 y.o. y/o female 2 Days Post-Op s/p Procedure(s):  DEBRIDEMENT, WOUND  CREATION, FLAP, MUSCLE ROTATION Doing well postoperatively.    Plan  Diabetic diet  IV abx  Pain control  ASA/plavix/hep drip  Wound vac to stay on at all times for 7 days, will remove 4/7  F/u vascular recs  Will plan for skin graft pending graft eval 4/7  Medical management as per primary  Discussed with Dr. Sherice Ireland DO- Fellow  Department of Plastic and Reconstructive Surgery  [phone] (office)

## 2023-04-01 NOTE — PROGRESS NOTES
[facility]  Vascular Surgery  Progress Note    Patient Name: Nelsy Marino  MRN: [id number]  Admission Date: 3/29/2023  Primary Care Provider: Juan F Garay    Subjective:     Interval History: No acute events. Went for L groin washout and flap with plastics. Wound vac in place, hep gtt running. Labs improving.    Post-Op Info:  Procedure(s) (LRB):  DEBRIDEMENT, WOUND (Left)  CREATION, FLAP, MUSCLE ROTATION (Left)   1 Day Post-Op       Medications:  Continuous Infusions:   heparin (porcine) in D5W 20 Units/kg/hr (04/01/23 0206)     Scheduled Meds:   amLODIPine  10 mg Oral Daily    ammonium lactate   Topical (Top) Daily    aspirin  81 mg Oral Daily    atorvastatin  40 mg Oral QHS    carvediloL  12.5 mg Oral BID    ceFEPime (MAXIPIME) IVPB  2 g Intravenous Q12H    clopidogreL  75 mg Oral Daily    DAPTOmycin (CUBICIN) IV (PEDS and ADULTS)  6 mg/kg Intravenous Q24H    DULoxetine  30 mg Oral BID    furosemide  20 mg Oral Daily    pantoprazole  40 mg Oral BID     PRN Meds:sodium chloride, dextrose 10%, glucagon (human recombinant), heparin (PORCINE), heparin (PORCINE), HYDROmorphone, insulin aspart U-100, ondansetron, oxyCODONE, oxyCODONE, sodium chloride 0.9%     Objective:     Vital Signs (Most Recent):  Temp: 97.9 °F (36.6 °C) (04/01/23 0413)  Pulse: 83 (04/01/23 0413)  Resp: 18 (04/01/23 0413)  BP: (!) 150/66 (04/01/23 0413)  SpO2: (!) 92 % (04/01/23 0413)   Vital Signs (24h Range):  Temp:  [97 °F (36.1 °C)-98.5 °F (36.9 °C)] 97.9 °F (36.6 °C)  Pulse:  [83-91] 83  Resp:  [11-28] 18  SpO2:  [92 %-100 %] 92 %  BP: (124-157)/(59-86) 150/66         Physical Exam  Constitutional:       General: She is not in acute distress.     Appearance: She is not ill-appearing.   Cardiovascular:      Rate and Rhythm: Normal rate.      Comments: Right: DP Triphasic signal, PT Biphasic signal  Left: DP monophasic signal, PT monophasic signal  Pulmonary:      Effort: Pulmonary effort is normal. No respiratory  distress.   Skin:     Comments: Left groin incision with wound vac in place. Good seal and on suction   Neurological:      Mental Status: She is alert.       Significant Labs:  CBC:   Recent Labs   Lab 04/01/23  0324   WBC 13.18*   RBC 3.52*   HGB 9.4*   HCT 30.1*      MCV 86   MCH 26.7*   MCHC 31.2*       CMP:   Recent Labs   Lab 04/01/23  0324   *   CALCIUM 8.8   ALBUMIN 2.4*   PROT 7.3      K 4.1   CO2 26      BUN 31*   CREATININE 1.1   ALKPHOS 65   ALT 7*   AST 16   BILITOT 0.6         Significant Diagnostics:  I have reviewed all pertinent imaging results/findings within the past 24 hours.    Assessment/Plan:     PVD (peripheral vascular disease)  Nelsy Marino is a 82 y.o. woman with history of HTN, T2DM, DVT, and PAD who underwent left to right fem to SFA bypass and right SFA angioplasty on 3/2/23 and was admitted until 3/20. She was seen by Dr. Dunbar in the office earlier today complaining of left rest pain and also found to have dehiscence of her left groin incision.  S/p wound washout and flip with plastics 3/31    - bed rest and wound vac on at all times, per plastics  - cont heparin gtt  - ID following. cont dapto and cefepime     - f/u OR wound cultures   - cardiac and diabetic diet. Add boosts   - Insulin SS  - Home meds  - Asa plavix statin  - Podiatry consulted for right TMA wound  Plan for OR Monday for endo revascularization of short segment occlusion left CFA  - needs consent and marked   - to perform dylan's test        Teresa Conley MD  Vascular Surgery  [facility]

## 2023-04-01 NOTE — SUBJECTIVE & OBJECTIVE
Medications:  Continuous Infusions:   heparin (porcine) in D5W 20 Units/kg/hr (04/01/23 0206)     Scheduled Meds:   amLODIPine  10 mg Oral Daily    ammonium lactate   Topical (Top) Daily    aspirin  81 mg Oral Daily    atorvastatin  40 mg Oral QHS    carvediloL  12.5 mg Oral BID    ceFEPime (MAXIPIME) IVPB  2 g Intravenous Q12H    clopidogreL  75 mg Oral Daily    DAPTOmycin (CUBICIN) IV (PEDS and ADULTS)  6 mg/kg Intravenous Q24H    DULoxetine  30 mg Oral BID    furosemide  20 mg Oral Daily    pantoprazole  40 mg Oral BID     PRN Meds:sodium chloride, dextrose 10%, glucagon (human recombinant), heparin (PORCINE), heparin (PORCINE), HYDROmorphone, insulin aspart U-100, ondansetron, oxyCODONE, oxyCODONE, sodium chloride 0.9%     Objective:     Vital Signs (Most Recent):  Temp: 97.9 °F (36.6 °C) (04/01/23 0413)  Pulse: 83 (04/01/23 0413)  Resp: 18 (04/01/23 0413)  BP: (!) 150/66 (04/01/23 0413)  SpO2: (!) 92 % (04/01/23 0413)   Vital Signs (24h Range):  Temp:  [97 °F (36.1 °C)-98.5 °F (36.9 °C)] 97.9 °F (36.6 °C)  Pulse:  [83-91] 83  Resp:  [11-28] 18  SpO2:  [92 %-100 %] 92 %  BP: (124-157)/(59-86) 150/66         Physical Exam  Constitutional:       General: She is not in acute distress.     Appearance: She is not ill-appearing.   Cardiovascular:      Rate and Rhythm: Normal rate.      Comments: Right: DP Triphasic signal, PT Biphasic signal  Left: DP monophasic signal, PT monophasic signal  Pulmonary:      Effort: Pulmonary effort is normal. No respiratory distress.   Skin:     Comments: Left groin incision with wound vac in place. Good seal and on suction   Neurological:      Mental Status: She is alert.       Significant Labs:  CBC:   Recent Labs   Lab 04/01/23  0324   WBC 13.18*   RBC 3.52*   HGB 9.4*   HCT 30.1*      MCV 86   MCH 26.7*   MCHC 31.2*       CMP:   Recent Labs   Lab 04/01/23  0324   *   CALCIUM 8.8   ALBUMIN 2.4*   PROT 7.3      K 4.1   CO2 26      BUN 31*   CREATININE 1.1    ALKPHOS 65   ALT 7*   AST 16   BILITOT 0.6         Significant Diagnostics:  I have reviewed all pertinent imaging results/findings within the past 24 hours.

## 2023-04-02 LAB
ALBUMIN SERPL BCP-MCNC: 2.3 G/DL (ref 3.5–5.2)
ALP SERPL-CCNC: 58 U/L (ref 55–135)
ALT SERPL W/O P-5'-P-CCNC: 8 U/L (ref 10–44)
ANION GAP SERPL CALC-SCNC: 10 MMOL/L (ref 8–16)
APTT PPP: 49.3 SEC (ref 21–32)
APTT PPP: 52.8 SEC (ref 21–32)
APTT PPP: 55 SEC (ref 21–32)
AST SERPL-CCNC: 18 U/L (ref 10–40)
BASOPHILS # BLD AUTO: 0.01 K/UL (ref 0–0.2)
BASOPHILS # BLD AUTO: 0.02 K/UL (ref 0–0.2)
BASOPHILS NFR BLD: 0.1 % (ref 0–1.9)
BASOPHILS NFR BLD: 0.2 % (ref 0–1.9)
BILIRUB SERPL-MCNC: 0.7 MG/DL (ref 0.1–1)
BUN SERPL-MCNC: 30 MG/DL (ref 8–23)
CALCIUM SERPL-MCNC: 8.9 MG/DL (ref 8.7–10.5)
CHLORIDE SERPL-SCNC: 102 MMOL/L (ref 95–110)
CO2 SERPL-SCNC: 28 MMOL/L (ref 23–29)
CREAT SERPL-MCNC: 1 MG/DL (ref 0.5–1.4)
DIFFERENTIAL METHOD: ABNORMAL
DIFFERENTIAL METHOD: ABNORMAL
EOSINOPHIL # BLD AUTO: 0.2 K/UL (ref 0–0.5)
EOSINOPHIL # BLD AUTO: 0.2 K/UL (ref 0–0.5)
EOSINOPHIL NFR BLD: 2 % (ref 0–8)
EOSINOPHIL NFR BLD: 2.3 % (ref 0–8)
ERYTHROCYTE [DISTWIDTH] IN BLOOD BY AUTOMATED COUNT: 16 % (ref 11.5–14.5)
ERYTHROCYTE [DISTWIDTH] IN BLOOD BY AUTOMATED COUNT: 16.2 % (ref 11.5–14.5)
EST. GFR  (NO RACE VARIABLE): 56.2 ML/MIN/1.73 M^2
GLUCOSE SERPL-MCNC: 199 MG/DL (ref 70–110)
HCT VFR BLD AUTO: 25.2 % (ref 37–48.5)
HCT VFR BLD AUTO: 27.5 % (ref 37–48.5)
HGB BLD-MCNC: 7.6 G/DL (ref 12–16)
HGB BLD-MCNC: 8.3 G/DL (ref 12–16)
IMM GRANULOCYTES # BLD AUTO: 0.02 K/UL (ref 0–0.04)
IMM GRANULOCYTES # BLD AUTO: 0.03 K/UL (ref 0–0.04)
IMM GRANULOCYTES NFR BLD AUTO: 0.2 % (ref 0–0.5)
IMM GRANULOCYTES NFR BLD AUTO: 0.3 % (ref 0–0.5)
LYMPHOCYTES # BLD AUTO: 1.3 K/UL (ref 1–4.8)
LYMPHOCYTES # BLD AUTO: 1.5 K/UL (ref 1–4.8)
LYMPHOCYTES NFR BLD: 12.2 % (ref 18–48)
LYMPHOCYTES NFR BLD: 13.7 % (ref 18–48)
MCH RBC QN AUTO: 26 PG (ref 27–31)
MCH RBC QN AUTO: 26.3 PG (ref 27–31)
MCHC RBC AUTO-ENTMCNC: 30.2 G/DL (ref 32–36)
MCHC RBC AUTO-ENTMCNC: 30.2 G/DL (ref 32–36)
MCV RBC AUTO: 86 FL (ref 82–98)
MCV RBC AUTO: 87 FL (ref 82–98)
MONOCYTES # BLD AUTO: 0.5 K/UL (ref 0.3–1)
MONOCYTES # BLD AUTO: 0.6 K/UL (ref 0.3–1)
MONOCYTES NFR BLD: 4.9 % (ref 4–15)
MONOCYTES NFR BLD: 5.6 % (ref 4–15)
NEUTROPHILS # BLD AUTO: 8.4 K/UL (ref 1.8–7.7)
NEUTROPHILS # BLD AUTO: 8.4 K/UL (ref 1.8–7.7)
NEUTROPHILS NFR BLD: 78.8 % (ref 38–73)
NEUTROPHILS NFR BLD: 79.7 % (ref 38–73)
NRBC BLD-RTO: 0 /100 WBC
NRBC BLD-RTO: 0 /100 WBC
PLATELET # BLD AUTO: 292 K/UL (ref 150–450)
PLATELET # BLD AUTO: 319 K/UL (ref 150–450)
PMV BLD AUTO: 10.2 FL (ref 9.2–12.9)
PMV BLD AUTO: 10.8 FL (ref 9.2–12.9)
POCT GLUCOSE: 221 MG/DL (ref 70–110)
POCT GLUCOSE: 246 MG/DL (ref 70–110)
POCT GLUCOSE: 253 MG/DL (ref 70–110)
POCT GLUCOSE: 300 MG/DL (ref 70–110)
POTASSIUM SERPL-SCNC: 3.7 MMOL/L (ref 3.5–5.1)
PROT SERPL-MCNC: 7.4 G/DL (ref 6–8.4)
RBC # BLD AUTO: 2.89 M/UL (ref 4–5.4)
RBC # BLD AUTO: 3.19 M/UL (ref 4–5.4)
SODIUM SERPL-SCNC: 140 MMOL/L (ref 136–145)
WBC # BLD AUTO: 10.57 K/UL (ref 3.9–12.7)
WBC # BLD AUTO: 10.59 K/UL (ref 3.9–12.7)

## 2023-04-02 PROCEDURE — 85025 COMPLETE CBC W/AUTO DIFF WBC: CPT | Mod: 91

## 2023-04-02 PROCEDURE — 36415 COLL VENOUS BLD VENIPUNCTURE: CPT | Performed by: SURGERY

## 2023-04-02 PROCEDURE — 85730 THROMBOPLASTIN TIME PARTIAL: CPT | Mod: 91 | Performed by: STUDENT IN AN ORGANIZED HEALTH CARE EDUCATION/TRAINING PROGRAM

## 2023-04-02 PROCEDURE — 80053 COMPREHEN METABOLIC PANEL: CPT

## 2023-04-02 PROCEDURE — 99233 PR SUBSEQUENT HOSPITAL CARE,LEVL III: ICD-10-PCS | Mod: ,,, | Performed by: PHYSICIAN ASSISTANT

## 2023-04-02 PROCEDURE — 25000003 PHARM REV CODE 250: Performed by: SURGERY

## 2023-04-02 PROCEDURE — 20600001 HC STEP DOWN PRIVATE ROOM

## 2023-04-02 PROCEDURE — 99233 SBSQ HOSP IP/OBS HIGH 50: CPT | Mod: ,,, | Performed by: PHYSICIAN ASSISTANT

## 2023-04-02 PROCEDURE — 25000003 PHARM REV CODE 250: Performed by: STUDENT IN AN ORGANIZED HEALTH CARE EDUCATION/TRAINING PROGRAM

## 2023-04-02 PROCEDURE — 63600175 PHARM REV CODE 636 W HCPCS: Performed by: STUDENT IN AN ORGANIZED HEALTH CARE EDUCATION/TRAINING PROGRAM

## 2023-04-02 PROCEDURE — 63600175 PHARM REV CODE 636 W HCPCS: Performed by: SURGERY

## 2023-04-02 PROCEDURE — 25000003 PHARM REV CODE 250

## 2023-04-02 PROCEDURE — 85730 THROMBOPLASTIN TIME PARTIAL: CPT | Performed by: SURGERY

## 2023-04-02 PROCEDURE — 85730 THROMBOPLASTIN TIME PARTIAL: CPT | Mod: 91 | Performed by: SURGERY

## 2023-04-02 PROCEDURE — 25000003 PHARM REV CODE 250: Performed by: PHYSICIAN ASSISTANT

## 2023-04-02 PROCEDURE — 36415 COLL VENOUS BLD VENIPUNCTURE: CPT

## 2023-04-02 PROCEDURE — 85025 COMPLETE CBC W/AUTO DIFF WBC: CPT | Performed by: STUDENT IN AN ORGANIZED HEALTH CARE EDUCATION/TRAINING PROGRAM

## 2023-04-02 RX ORDER — ACETAMINOPHEN 325 MG/1
650 TABLET ORAL EVERY 6 HOURS
Status: DISCONTINUED | OUTPATIENT
Start: 2023-04-02 | End: 2023-04-11 | Stop reason: HOSPADM

## 2023-04-02 RX ORDER — FLUCONAZOLE 200 MG/1
400 TABLET ORAL DAILY
Status: DISCONTINUED | OUTPATIENT
Start: 2023-04-02 | End: 2023-04-03

## 2023-04-02 RX ORDER — OXYCODONE HYDROCHLORIDE 5 MG/1
5 TABLET ORAL EVERY 4 HOURS PRN
Status: DISCONTINUED | OUTPATIENT
Start: 2023-04-02 | End: 2023-04-11 | Stop reason: HOSPADM

## 2023-04-02 RX ADMIN — ATORVASTATIN CALCIUM 40 MG: 40 TABLET, FILM COATED ORAL at 09:04

## 2023-04-02 RX ADMIN — DULOXETINE 30 MG: 30 CAPSULE, DELAYED RELEASE ORAL at 08:04

## 2023-04-02 RX ADMIN — CLOPIDOGREL BISULFATE 75 MG: 75 TABLET ORAL at 08:04

## 2023-04-02 RX ADMIN — PANTOPRAZOLE SODIUM 40 MG: 40 TABLET, DELAYED RELEASE ORAL at 08:04

## 2023-04-02 RX ADMIN — INSULIN ASPART 6 UNITS: 100 INJECTION, SOLUTION INTRAVENOUS; SUBCUTANEOUS at 12:04

## 2023-04-02 RX ADMIN — CEFEPIME 2 G: 2 INJECTION, POWDER, FOR SOLUTION INTRAVENOUS at 02:04

## 2023-04-02 RX ADMIN — CARVEDILOL 12.5 MG: 12.5 TABLET, FILM COATED ORAL at 08:04

## 2023-04-02 RX ADMIN — OXYCODONE HYDROCHLORIDE 10 MG: 10 TABLET ORAL at 01:04

## 2023-04-02 RX ADMIN — ACETAMINOPHEN 650 MG: 325 TABLET ORAL at 06:04

## 2023-04-02 RX ADMIN — HEPARIN SODIUM 16 UNITS/KG/HR: 10000 INJECTION, SOLUTION INTRAVENOUS at 02:04

## 2023-04-02 RX ADMIN — FLUCONAZOLE 400 MG: 200 TABLET ORAL at 10:04

## 2023-04-02 RX ADMIN — OXYCODONE HYDROCHLORIDE 10 MG: 10 TABLET ORAL at 12:04

## 2023-04-02 RX ADMIN — INSULIN ASPART 4 UNITS: 100 INJECTION, SOLUTION INTRAVENOUS; SUBCUTANEOUS at 06:04

## 2023-04-02 RX ADMIN — OXYCODONE HYDROCHLORIDE 10 MG: 10 TABLET ORAL at 08:04

## 2023-04-02 RX ADMIN — ASPIRIN 81 MG: 81 TABLET, COATED ORAL at 08:04

## 2023-04-02 RX ADMIN — DAPTOMYCIN 400 MG: 350 INJECTION, POWDER, LYOPHILIZED, FOR SOLUTION INTRAVENOUS at 04:04

## 2023-04-02 RX ADMIN — AMLODIPINE BESYLATE 10 MG: 10 TABLET ORAL at 08:04

## 2023-04-02 RX ADMIN — FUROSEMIDE 20 MG: 20 TABLET ORAL at 08:04

## 2023-04-02 RX ADMIN — INSULIN ASPART 6 UNITS: 100 INJECTION, SOLUTION INTRAVENOUS; SUBCUTANEOUS at 05:04

## 2023-04-02 NOTE — PLAN OF CARE
POC reviewed. AAOx4, able to assist in her care. VSS on RA with no complaints of SOB, headaches, or dizziness. Urine output per [employer] to low continuous wall suction. Cardiac Diet, no intake during evening shift. No bowel movements and no complaints of nausea or vomiting. Pain controlled with prn pain medications. Blood glucose monitored q6h, covered by sliding scale insulin aspart. Heparin Aptt within therapeutic range for the last two lab draws. Resting with side rails up and call light within reach. Continuing to monitor patient status.

## 2023-04-02 NOTE — PROGRESS NOTES
Plastic and Reconstructive Surgery   Progress Note    Subjective:      Patient seen and examined at bedside. No acute event overnight. Patient still complains of left lower leg pain. Patient not eating much.   at bedside.    Objective:  Vital signs in last 24 hours:  Temp:  [97.9 °F (36.6 °C)-98.5 °F (36.9 °C)] 98.5 °F (36.9 °C)  Pulse:  [77-89] 89  Resp:  [14-20] 14  SpO2:  [92 %-96 %] 93 %  BP: (111-156)/(54-65) 156/57    Intake/Output last 3 shifts:  I/O last 3 completed shifts:  In: -   Out: 720 [Urine:650; Drains:20; Other:50]    Intake/Output this shift:  I/O this shift:  In: -   Out: 50 [Other:50]        Physical Exam:  VITAL SIGNS:   Vitals:    23 0751 23 0846 23 1201 23 1245   BP: (!) 120/56  (!) 156/57    BP Location: Left arm  Left arm    Patient Position: Sitting  Lying    Pulse: 87  89    Resp: 14 14 18 14   Temp: 97.9 °F (36.6 °C)  98.5 °F (36.9 °C)    TempSrc: Oral  Oral    SpO2: (!) 92%  (!) 93%    Weight:       Height:         TMAX: Temp (24hrs), Av.3 °F (36.8 °C), Min:97.9 °F (36.6 °C), Max:98.5 °F (36.9 °C)    General: Alert; No acute distress  Cardiovascular: Regular rate   Respiratory: Normal respiratory effort. Chest rise symmetric.   Abdomen: Soft, nontender, nondistended  Extremity: left groin/thigh with wound vac in place  Neurologic: No focal deficit. Speech normal      Scheduled Medications amLODIPine, 10 mg, Daily  ammonium lactate, , Daily  aspirin, 81 mg, Daily  atorvastatin, 40 mg, QHS  carvediloL, 12.5 mg, BID  ceFEPime (MAXIPIME) IVPB, 2 g, Q12H  clopidogreL, 75 mg, Daily  DAPTOmycin (CUBICIN) IV (PEDS and ADULTS), 6 mg/kg, Q24H  DULoxetine, 30 mg, BID  fluconazole, 400 mg, Daily  furosemide, 20 mg, Daily  pantoprazole, 40 mg, BID      PRN Medications sodium chloride, dextrose 10%, glucagon (human recombinant), heparin (PORCINE), heparin (PORCINE), HYDROmorphone, insulin aspart U-100, ondansetron, oxyCODONE, oxyCODONE, sodium chloride 0.9%    Recent  Labs:   Lab Results   Component Value Date    WBC 10.59 04/02/2023    HGB 8.3 (L) 04/02/2023    HCT 27.5 (L) 04/02/2023    MCV 86 04/02/2023     04/02/2023     Lab Results   Component Value Date     (H) 04/02/2023     04/02/2023    K 3.7 04/02/2023     04/02/2023    BUN 30 (H) 04/02/2023         Assessment: 82 y.o. y/o female 2 Days Post-Op s/p Procedure(s):  DEBRIDEMENT, WOUND  CREATION, FLAP, MUSCLE ROTATION Doing well postoperatively.    Plan  Diabetic diet  IV abx  Pain control  ASA/plavix/hep drip  Wound vac to stay on at all times for 7 days, will remove 4/7  F/u vascular recs  Will plan for skin graft pending graft eval 4/7  Medical management as per primary  Discussed with Dr. Sherice Ireland DO- Fellow  Department of Plastic and Reconstructive Surgery  [phone] (office)

## 2023-04-02 NOTE — SUBJECTIVE & OBJECTIVE
Interval History:   No acute events   Doesn't feel great  Pain controlled  L groin wound cx with C albicans  The patient denies any recent fever, chills, or sweats.      Review of Systems   Constitutional:  Negative for activity change, chills, diaphoresis and fever.   Respiratory:  Negative for cough, shortness of breath and wheezing.    Cardiovascular:  Negative for chest pain.   Gastrointestinal:  Negative for abdominal pain, constipation, diarrhea, nausea and vomiting.   Genitourinary:  Negative for dysuria, frequency and urgency.   Neurological:  Negative for dizziness.   Hematological:  Does not bruise/bleed easily.   Objective:     Vital Signs (Most Recent):  Temp: 98.5 °F (36.9 °C) (04/02/23 1548)  Pulse: 87 (04/02/23 1548)  Resp: 18 (04/02/23 1548)  BP: (!) 122/55 (04/02/23 1548)  SpO2: (!) 94 % (04/02/23 1548)   Vital Signs (24h Range):  Temp:  [97.9 °F (36.6 °C)-98.5 °F (36.9 °C)] 98.5 °F (36.9 °C)  Pulse:  [87-89] 87  Resp:  [14-20] 18  SpO2:  [92 %-94 %] 94 %  BP: (111-156)/(54-65) 122/55     Weight: 67 kg (147 lb 11.3 oz)  Body mass index is 25.35 kg/m².    Estimated Creatinine Clearance: 40.8 mL/min (based on SCr of 1 mg/dL).    Physical Exam  Vitals reviewed.   Constitutional:       General: She is not in acute distress.     Appearance: She is not ill-appearing, toxic-appearing or diaphoretic.   HENT:      Head: Normocephalic and atraumatic.      Nose: Nose normal. No congestion.   Eyes:      General: No scleral icterus.     Conjunctiva/sclera: Conjunctivae normal.   Cardiovascular:      Rate and Rhythm: Normal rate and regular rhythm.   Pulmonary:      Effort: Pulmonary effort is normal. No respiratory distress.   Abdominal:      General: There is no distension.      Palpations: Abdomen is soft.      Tenderness: There is no abdominal tenderness.   Musculoskeletal:         General: Deformity (TMA site necrotic) present. No tenderness.      Right lower leg: No edema.      Left lower leg: No edema.    Skin:     General: Skin is warm and dry.      Findings: No rash.      Comments: L groin with wound vac   Neurological:      Mental Status: She is alert and oriented to person, place, and time.   Psychiatric:         Mood and Affect: Mood normal.         Behavior: Behavior normal.         Thought Content: Thought content normal.         Judgment: Judgment normal.       Significant Labs: Blood Culture:   Recent Labs   Lab 02/27/23  1923 03/08/23  2325 03/10/23  0942 03/10/23  0943 03/29/23 2009   LABBLOO No Growth after 4 days. No growth after 5 days.  No growth after 5 days. No growth after 5 days. No growth after 5 days. No Growth to date  No Growth to date  No Growth to date  No Growth to date  No Growth to date  No Growth to date  No Growth to date  No Growth to date     CBC:   Recent Labs   Lab 04/01/23  0324 04/02/23  0325 04/02/23  0613   WBC 13.18* 10.57 10.59   HGB 9.4* 7.6* 8.3*   HCT 30.1* 25.2* 27.5*    319 292     CMP:   Recent Labs   Lab 04/01/23  0324 04/02/23  0828    140   K 4.1 3.7    102   CO2 26 28   * 199*   BUN 31* 30*   CREATININE 1.1 1.0   CALCIUM 8.8 8.9   PROT 7.3 7.4   ALBUMIN 2.4* 2.3*   BILITOT 0.6 0.7   ALKPHOS 65 58   AST 16 18   ALT 7* 8*   ANIONGAP 13 10     Wound Culture:   Recent Labs   Lab 03/04/23  1141 03/10/23  1340 03/31/23  0805 03/31/23  0809   LABAERO No growth PSEUDOMONAS FLUORESCENS [facility]  Few  * FELI ALBICANS  Rare  * No growth     All pertinent labs within the past 24 hours have been reviewed.    Significant Imaging: I have reviewed all pertinent imaging results/findings within the past 24 hours.  CTA Runoff ABD PEL Bilat Lower Ext [[id number]] Resulted: 03/30/23 0023   Order Status: Completed Updated: 03/30/23 0026   Narrative:     EXAMINATION:   CTA RUNOFF ABD PEL BILAT LOWER EXT     CLINICAL HISTORY:   Claudication or leg ischemia;     TECHNIQUE:   CTA of abdomen, pelvis, and bilateral lower extremities performed with 125 mL  Omnipaque 350 intravenous contrast.     COMPARISON:   Ultrasound lower extremity veins 03/07/2023, CT chest abdomen pelvis 03/10/2023, CTA runoff 02/13/2023.     FINDINGS:   Vascular:     Aorta: Moderate calcific atherosclerosis of the aorta. Aorta is normal caliber with no dissection, or aneurysm.  Severe calcific atherosclerosis of the iliac arteries down to the extremities bilaterally.     Redemonstration of complete occlusion of the old femoral-femoral bypass.  There is new lower abdominal wall postoperative changes of femoral bypass graft creation with appropriate opacification through the graft with no evidence of occlusion.  There is extensive soft tissue stranding and trace of fluid around the left femoral artery (axial series image 623).  No organized fluid collection.     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  Occlusion or severe stenosis of the right SFA with distal reconstitution.  Stable occluded by femoral bypass graft.  New bi femoral bypass graft is grossly patent.  Reconstitution of the right SFA beyond the bypass graft.     Popliteal artery: Patent     Anterior tibial artery: Grossly patent with mild intermittent stenosis.     Posterior tibial artery: Grossly patent with mild intermittent stenosis.     Peroneal artery: Grossly patent with mild intermittent stenosis.     Left:     Common iliac artery: Patent     External iliac artery: Patent     Common femoral artery: Patent     Femoral artery: Femoral graft placement.  Severe stenosis of the femoral artery distal to the anastomosis with distal reconstitution.  Multifocal intermittent stenosis throughout the left femoral artery.     Popliteal artery: Patent     Anterior tibial artery: Patent     Posterior tibial artery: Patent     Peroneal artery: Patent with multifocal intermittent stenosis.      Nonvascular:     Heart: No cardiomegaly.  Trace of pericardial effusion. Multiple coronaries calcific atherosclerosis.     Lung Bases: Bibasal ground-glass opacities and interlobular wall thickening we reticulations suggestive underlining small airway disease with subpleural scarring.  No pleural effusion.     Liver: Normal in size and attenuation without focal hepatic abnormality.     Gallbladder: Cholelithiasis with no gallbladder wall thickening or pericholecystic fluid collection.     Bile Ducts: No intra or extrahepatic biliary ductal dilation.     Pancreas: No pancreatic mass lesion or peripancreatic inflammatory change.     Spleen: Normal.     Adrenals: Normal.     Genitourinary: Normal in size and location.Normal enhancement.  No focal renal abnormality, nephrolithiasis, or hydroureteronephrosis.  1.8 cm left renal cyst.  Bladder demonstrates smooth contours without bladder wall thickening.     Reproductive organs: Normal.     GI tract/Mesentery: Stomach is normal appearance. Visualized loops of small and large bowel are normal in caliber without evidence for inflammation or obstruction.  Appendix is unremarkable     Peritoneal Space: No abdominopelvic ascites or intraperitoneal free air.     Retroperitoneum: No significant adenopathy.     Bones: Multilevel degenerative changes.  No acute fracture.    Impression:       Extensive calcific atherosclerosis grossly patent bilateral three-vessel runoff.     Interval placement of new femoral-femoral bypass graft placement.  Old femoral-femoral bypass graft remains occluded.  Short segment occlusion versus severe stenosis of the proximal right femoral artery.  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  Significant stenosis of the left femoral artery beyond the anastomosis with distal reconstitution.     Moderate soft tissue edema with trace fluid collection in the left groin potentially related to recent surgery.  Sterility of this collection cannot  be confirmed with CT.     Bibasilar ground-glass attenuation with interlobular septal thickening which could be related to chronic interstitial lung disease or low-grade infectious/inflammatory process.     Additional findings as above.     Electronically signed by resident: Aide Varela   Date: 03/29/2023   Time: 23:01     Electronically signed by: Mumtaz Fall MD   Date: 03/30/2023   Time: 00:23     Imaging History    2023    Date Procedure Name Study Review Link PACS Link Status Accession Number Location   03/29/23 10:00 PM CTA Runoff ABD PEL Bilat Lower Ext Study Review  Images Final [id number] [facility]   03/16/23 08:20 AM VAS Ankle Brachial Indices Resting Study Review  Images Final [id number]    03/13/23 10:57 AM X-Ray Foot Complete Right Study Review  Images Final [id number] [facility]   03/10/23 08:20 PM CT Chest Abdomen Pelvis Without Contrast (XPD) Study Review  Images Final [id number] [facility]   03/10/23 10:33 AM X-Ray Chest AP Portable Study Review  Images Final [id number] [facility]   03/08/23 07:37 PM X-Ray Chest AP Portable Study Review  Images Final [id number] [facility]   03/07/23 01:27 AM US Lower Extremity Veins Right Study Review  Images Final [id number] [facility]   03/04/23 12:48 PM X-Ray Foot Complete Right Study Review  Images Final [id number] [facility]   03/31/23 11:24 AM CARDIAC MONITORING STRIPS Study Review  Final     03/09/23 09:34 AM Echo Study Review  Images Final [id number] [facility]

## 2023-04-02 NOTE — ASSESSMENT & PLAN NOTE
Patient with history of left to right fem to SFA bypass and right SFA angioplasty on 3/2/23. She has developed dehiscence of her left groin decision.  CTA showed soft tissue stranding and trace fluid around the left femoral artery. No organized fluid collection. S/P OR 3/31 with vascular surgery for debridement of wound. ID consulted for antibiotic recommendations.     OR cultures with C albicans - concern that has colonized graft.    Recommendations  · Continue Vancomycin and Cefepime  · Add fluconazole 400mg po daily  · Confirm with Vasc/Plastic sx that graft involved  · Podiatry following for TMA wound management  · Will follow cultures to guide antibiotic therapy  · Discussed antibiotic plan with ID staff. ID will follow.

## 2023-04-02 NOTE — PROGRESS NOTES
[facility]  Infectious Disease  Progress Note    Patient Name: Nelsy Marino  MRN: [id number]  Admission Date: 3/29/2023  Length of Stay: 4 days  Attending Physician: Danny Dunbar MD  Primary Care Provider: [facility]    Isolation Status: No active isolations  Assessment/Plan:      Orthopedic  * Wound dehiscence  Patient with history of left to right fem to SFA bypass and right SFA angioplasty on 3/2/23. She has developed dehiscence of her left groin decision.  CTA showed soft tissue stranding and trace fluid around the left femoral artery. No organized fluid collection. S/P OR 3/31 with vascular surgery for debridement of wound. ID consulted for antibiotic recommendations.     OR cultures with C albicans - concern that has colonized graft.    Recommendations  · Continue Vancomycin and Cefepime  · Add fluconazole 400mg po daily  · Confirm with Vasc/Plastic sx that graft involved  · Podiatry following for TMA wound management  · Will follow cultures to guide antibiotic therapy  · Discussed antibiotic plan with ID staff. ID will follow.        Anticipated Disposition: tbd    Thank you for your consult. I will follow-up with patient. Please contact us if you have any additional questions.    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     Patient was admitted earlier this month with right foot gangrene. She underwent left external iliac to R SFA bypass with PTFE, left common + ext iliac artery stenting, R SFA angioplasty with drug coated balloon on 3/02. She then underwent right TMA 3/04 (surgical cx of clean bone margin neg, path neg). During  her stay she did develop wound dehiscence at TMA site. Wound cultures grew pseudomonas fluorescens and she completed a course of ciprofloxacin with plan was to resume cephalexin for chronic suppression of prior graft infection after completing ciprofloxacin.    Patient was discharged home on 3/20. While at home, her  noticed her left groin wound had dehisced. Denies purulent drainage. Per patient, she is mainly bed bound. Denies fevers, chills, sweats. Denies foul odor, diarrhea, urinary contamination. Her TMA site is necrotic. Non tender. Patient followed up with vascular surgery yesterday who recommended admission.      CTA showed redemonstration of complete occlusion of the old femoral-femoral bypass. There is new lower abdominal wall postoperative changes of femoral bypass graft creation with appropriate opacification through the graft with no evidence of occlusion. There is extensive soft tissue stranding and trace of fluid around the left femoral artery. No organized fluid collection.     Primary team started Vanc/Zosyn. ID consulted for antibiotic recommendations.          Interval History:   No acute events   Doesn't feel great  Pain controlled  L groin wound cx with C albicans  The patient denies any recent fever, chills, or sweats.      Review of Systems   Constitutional:  Negative for activity change, chills, diaphoresis and fever.   Respiratory:  Negative for cough, shortness of breath and wheezing.    Cardiovascular:  Negative for chest pain.   Gastrointestinal:  Negative for abdominal pain, constipation, diarrhea, nausea and vomiting.   Genitourinary:  Negative for dysuria, frequency and urgency.   Neurological:  Negative for dizziness.   Hematological:  Does not bruise/bleed easily.   Objective:     Vital Signs (Most Recent):  Temp: 98.5 °F (36.9 °C) (04/02/23 1548)  Pulse: 87 (04/02/23 1548)  Resp: 18 (04/02/23 1548)  BP: (!) 122/55 (04/02/23 1548)  SpO2: (!) 94 % (04/02/23 1548)   Vital Signs  (24h Range):  Temp:  [97.9 °F (36.6 °C)-98.5 °F (36.9 °C)] 98.5 °F (36.9 °C)  Pulse:  [87-89] 87  Resp:  [14-20] 18  SpO2:  [92 %-94 %] 94 %  BP: (111-156)/(54-65) 122/55     Weight: 67 kg (147 lb 11.3 oz)  Body mass index is 25.35 kg/m².    Estimated Creatinine Clearance: 40.8 mL/min (based on SCr of 1 mg/dL).    Physical Exam  Vitals reviewed.   Constitutional:       General: She is not in acute distress.     Appearance: She is not ill-appearing, toxic-appearing or diaphoretic.   HENT:      Head: Normocephalic and atraumatic.      Nose: Nose normal. No congestion.   Eyes:      General: No scleral icterus.     Conjunctiva/sclera: Conjunctivae normal.   Cardiovascular:      Rate and Rhythm: Normal rate and regular rhythm.   Pulmonary:      Effort: Pulmonary effort is normal. No respiratory distress.   Abdominal:      General: There is no distension.      Palpations: Abdomen is soft.      Tenderness: There is no abdominal tenderness.   Musculoskeletal:         General: Deformity (TMA site necrotic) present. No tenderness.      Right lower leg: No edema.      Left lower leg: No edema.   Skin:     General: Skin is warm and dry.      Findings: No rash.      Comments: L groin with wound vac   Neurological:      Mental Status: She is alert and oriented to person, place, and time.   Psychiatric:         Mood and Affect: Mood normal.         Behavior: Behavior normal.         Thought Content: Thought content normal.         Judgment: Judgment normal.       Significant Labs: Blood Culture:   Recent Labs   Lab 02/27/23  1923 03/08/23  2325 03/10/23  0942 03/10/23  0943 03/29/23  2009   LABBLOO No Growth after 4 days. No growth after 5 days.  No growth after 5 days. No growth after 5 days. No growth after 5 days. No Growth to date  No Growth to date  No Growth to date  No Growth to date  No Growth to date  No Growth to date  No Growth to date  No Growth to date     CBC:   Recent Labs   Lab 04/01/23  0324 04/02/23  0325  04/02/23  0613   WBC 13.18* 10.57 10.59   HGB 9.4* 7.6* 8.3*   HCT 30.1* 25.2* 27.5*    319 292     CMP:   Recent Labs   Lab 04/01/23  0324 04/02/23  0828    140   K 4.1 3.7    102   CO2 26 28   * 199*   BUN 31* 30*   CREATININE 1.1 1.0   CALCIUM 8.8 8.9   PROT 7.3 7.4   ALBUMIN 2.4* 2.3*   BILITOT 0.6 0.7   ALKPHOS 65 58   AST 16 18   ALT 7* 8*   ANIONGAP 13 10     Wound Culture:   Recent Labs   Lab 03/04/23  1141 03/10/23  1340 03/31/23  0805 03/31/23  0809   LABAERO No growth PSEUDOMONAS FLUORESCENS GROUP  Few  * FELI ALBICANS  Rare  * No growth     All pertinent labs within the past 24 hours have been reviewed.    Significant Imaging: I have reviewed all pertinent imaging results/findings within the past 24 hours.  CTA Runoff ABD PEL Bilat Lower Ext [[id number]] Resulted: 03/30/23 0023   Order Status: Completed Updated: 03/30/23 0026   Narrative:     EXAMINATION:   CTA RUNOFF ABD PEL BILAT LOWER EXT     CLINICAL HISTORY:   Claudication or leg ischemia;     TECHNIQUE:   CTA of abdomen, pelvis, and bilateral lower extremities performed with 125 mL Omnipaque 350 intravenous contrast.     COMPARISON:   Ultrasound lower extremity veins 03/07/2023, CT chest abdomen pelvis 03/10/2023, CTA runoff 02/13/2023.     FINDINGS:   Vascular:     Aorta: Moderate calcific atherosclerosis of the aorta. Aorta is normal caliber with no dissection, or aneurysm.  Severe calcific atherosclerosis of the iliac arteries down to the extremities bilaterally.     Redemonstration of complete occlusion of the old femoral-femoral bypass.  There is new lower abdominal wall postoperative changes of femoral bypass graft creation with appropriate opacification through the graft with no evidence of occlusion.  There is extensive soft tissue stranding and trace of fluid around the left femoral artery (axial series image 623).  No organized fluid collection.     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  Occlusion or severe stenosis of the right SFA with distal reconstitution.  Stable occluded by femoral bypass graft.  New bi femoral bypass graft is grossly patent.  Reconstitution of the right SFA beyond the bypass graft.     Popliteal artery: Patent     Anterior tibial artery: Grossly patent with mild intermittent stenosis.     Posterior tibial artery: Grossly patent with mild intermittent stenosis.     Peroneal artery: Grossly patent with mild intermittent stenosis.     Left:     Common iliac artery: Patent     External iliac artery: Patent     Common femoral artery: Patent     Femoral artery: Femoral graft placement.  Severe stenosis of the femoral artery distal to the anastomosis with distal reconstitution.  Multifocal intermittent stenosis throughout the left femoral artery.     Popliteal artery: Patent     Anterior tibial artery: Patent     Posterior tibial artery: Patent     Peroneal artery: Patent with multifocal intermittent stenosis.     Nonvascular:     Heart: No cardiomegaly.  Trace of pericardial effusion. Multiple coronaries calcific atherosclerosis.     Lung Bases: Bibasal ground-glass opacities and interlobular wall thickening we reticulations suggestive underlining small airway disease with subpleural scarring.  No pleural effusion.     Liver: Normal in size and attenuation without focal hepatic abnormality.     Gallbladder: Cholelithiasis with no gallbladder wall thickening or pericholecystic fluid collection.     Bile Ducts: No intra or extrahepatic biliary ductal dilation.     Pancreas: No pancreatic mass lesion or peripancreatic inflammatory change.     Spleen: Normal.     Adrenals: Normal.     Genitourinary: Normal in size and location.Normal enhancement.  No focal renal abnormality, nephrolithiasis, or hydroureteronephrosis.  1.8 cm left renal cyst.  Bladder  demonstrates smooth contours without bladder wall thickening.     Reproductive organs: Normal.     GI tract/Mesentery: Stomach is normal appearance. Visualized loops of small and large bowel are normal in caliber without evidence for inflammation or obstruction.  Appendix is unremarkable     Peritoneal Space: No abdominopelvic ascites or intraperitoneal free air.     Retroperitoneum: No significant adenopathy.     Bones: Multilevel degenerative changes.  No acute fracture.    Impression:       Extensive calcific atherosclerosis grossly patent bilateral three-vessel runoff.     Interval placement of new femoral-femoral bypass graft placement.  Old femoral-femoral bypass graft remains occluded.  Short segment occlusion versus severe stenosis of the proximal right femoral artery.  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  Significant stenosis of the left femoral artery beyond the anastomosis with distal reconstitution.     Moderate soft tissue edema with trace fluid collection in the left groin potentially related to recent surgery.  Sterility of this collection cannot be confirmed with CT.     Bibasilar ground-glass attenuation with interlobular septal thickening which could be related to chronic interstitial lung disease or low-grade infectious/inflammatory process.     Additional findings as above.     Electronically signed by resident: Aide Varela   Date: 03/29/2023   Time: 23:01     Electronically signed by: Mumtaz Fall MD   Date: 03/30/2023   Time: 00:23     Imaging History    2023    Date Procedure Name Study Review Link PACS Link Status Accession Number Location   03/29/23 10:00 PM CTA Runoff ABD PEL Bilat Lower Ext Study Review  Images Final [id number] [facility]   03/16/23 08:20 AM VAS Ankle Brachial Indices Resting Study Review  Images Final [id number]    03/13/23 10:57 AM X-Ray Foot Complete Right Study Review  Images Final [id number] [facility]   03/10/23 08:20 PM CT Chest Abdomen Pelvis Without  Contrast (XPD) Study Review  Images Final [id number] [facility]   03/10/23 10:33 AM X-Ray Chest AP Portable Study Review  Images Final [id number] [facility]   03/08/23 07:37 PM X-Ray Chest AP Portable Study Review  Images Final [id number] [facility]   03/07/23 01:27 AM US Lower Extremity Veins Right Study Review  Images Final [id number] [facility]   03/04/23 12:48 PM X-Ray Foot Complete Right Study Review  Images Final [id number] [facility]   03/31/23 11:24 AM CARDIAC MONITORING STRIPS Study Review  Final     03/09/23 09:34 AM Echo Study Review  Images Final [id number] [facility]      PRINCIPAL DISCHARGE DIAGNOSIS  Diagnosis: Alcohol withdrawal syndrome  Assessment and Plan of Treatment:       SECONDARY DISCHARGE DIAGNOSES  Diagnosis: Alcoholic intoxication without complication  Assessment and Plan of Treatment:     Diagnosis: Hypertension  Assessment and Plan of Treatment:     Diagnosis: Protein calorie malnutrition  Assessment and Plan of Treatment:

## 2023-04-02 NOTE — PROGRESS NOTES
[facility]  Vascular Surgery  Progress Note    Patient Name: Nelsy Marino  MRN: [id number]  Admission Date: 3/29/2023  Primary Care Provider: Juan F Garay    Subjective:     Interval History: No acute events. AF, HDS. Doing well. Pain controlled. Wound vac in place    Post-Op Info:  Procedure(s) (LRB):  DEBRIDEMENT, WOUND (Left)  CREATION, FLAP, MUSCLE ROTATION (Left)   2 Days Post-Op       Medications:  Continuous Infusions:   heparin (porcine) in D5W 16 Units/kg/hr (04/02/23 0200)     Scheduled Meds:   amLODIPine  10 mg Oral Daily    ammonium lactate   Topical (Top) Daily    aspirin  81 mg Oral Daily    atorvastatin  40 mg Oral QHS    carvediloL  12.5 mg Oral BID    ceFEPime (MAXIPIME) IVPB  2 g Intravenous Q12H    clopidogreL  75 mg Oral Daily    DAPTOmycin (CUBICIN) IV (PEDS and ADULTS)  6 mg/kg Intravenous Q24H    DULoxetine  30 mg Oral BID    fluconazole  400 mg Oral Daily    furosemide  20 mg Oral Daily    pantoprazole  40 mg Oral BID     PRN Meds:sodium chloride, dextrose 10%, glucagon (human recombinant), heparin (PORCINE), heparin (PORCINE), HYDROmorphone, insulin aspart U-100, ondansetron, oxyCODONE, oxyCODONE, sodium chloride 0.9%     Objective:     Vital Signs (Most Recent):  Temp: 98.5 °F (36.9 °C) (04/02/23 1201)  Pulse: 89 (04/02/23 1201)  Resp: 18 (04/02/23 1201)  BP: (!) 156/57 (04/02/23 1201)  SpO2: (!) 93 % (04/02/23 1201)   Vital Signs (24h Range):  Temp:  [97.9 °F (36.6 °C)-98.5 °F (36.9 °C)] 98.5 °F (36.9 °C)  Pulse:  [77-89] 89  Resp:  [14-20] 18  SpO2:  [92 %-96 %] 93 %  BP: (111-156)/(54-65) 156/57     Date 04/02/23 0700 - 04/03/23 0659   Shift 0388-3692 9555-4819 1608-3126 24 Hour Total   INTAKE   Shift Total(mL/kg)       OUTPUT   Other 50   50   Shift Total(mL/kg) 50(0.7)   50(0.7)   Weight (kg) 67 67 67 67       Physical Exam  Constitutional:       General: She is not in acute distress.     Appearance: She is not ill-appearing.   Cardiovascular:      Rate and  Rhythm: Normal rate.      Comments: Right: DP Triphasic signal, PT Biphasic signal  Left: DP monophasic signal, PT monophasic signal  Pulmonary:      Effort: Pulmonary effort is normal. No respiratory distress.   Skin:     Comments: Left groin incision with wound vac in place. Good seal and on suction   Neurological:      Mental Status: She is alert.       Significant Labs:  CBC:   Recent Labs   Lab 04/02/23  0613   WBC 10.59   RBC 3.19*   HGB 8.3*   HCT 27.5*      MCV 86   MCH 26.0*   MCHC 30.2*       CMP:   Recent Labs   Lab 04/02/23  0828   *   CALCIUM 8.9   ALBUMIN 2.3*   PROT 7.4      K 3.7   CO2 28      BUN 30*   CREATININE 1.0   ALKPHOS 58   ALT 8*   AST 18   BILITOT 0.7         Significant Diagnostics:  I have reviewed all pertinent imaging results/findings within the past 24 hours.    Assessment/Plan:     PVD (peripheral vascular disease)  Nelsy Marino is a 82 y.o. woman with history of HTN, T2DM, DVT, and PAD who underwent left to right fem to SFA bypass and right SFA angioplasty on 3/2/23 and was admitted until 3/20. She was seen by Dr. Dunbar in the office earlier today complaining of left rest pain and also found to have dehiscence of her left groin incision.  S/p wound washout and flip with plastics 3/31    - bed rest and wound vac on at all times, per plastics  - cont heparin gtt  - ID following. cont dapto and cefepime     - f/u OR wound cultures. Gram stain no organisms   - cardiac and diabetic diet. boosts   - Insulin SS  - Home meds  - Asa plavix statin  - Podiatry consulted for right TMA wound    Plan for OR Tuesday (4/4) for endo revascularization of short segment occlusion left CFA  - to perform dylan's test  - to be consent and marked today  - NPO MN tomorrow 4/3        Teresa Conley MD  Vascular Surgery  [facility]

## 2023-04-02 NOTE — SUBJECTIVE & OBJECTIVE
Medications:  Continuous Infusions:   heparin (porcine) in D5W 16 Units/kg/hr (04/02/23 0200)     Scheduled Meds:   amLODIPine  10 mg Oral Daily    ammonium lactate   Topical (Top) Daily    aspirin  81 mg Oral Daily    atorvastatin  40 mg Oral QHS    carvediloL  12.5 mg Oral BID    ceFEPime (MAXIPIME) IVPB  2 g Intravenous Q12H    clopidogreL  75 mg Oral Daily    DAPTOmycin (CUBICIN) IV (PEDS and ADULTS)  6 mg/kg Intravenous Q24H    DULoxetine  30 mg Oral BID    fluconazole  400 mg Oral Daily    furosemide  20 mg Oral Daily    pantoprazole  40 mg Oral BID     PRN Meds:sodium chloride, dextrose 10%, glucagon (human recombinant), heparin (PORCINE), heparin (PORCINE), HYDROmorphone, insulin aspart U-100, ondansetron, oxyCODONE, oxyCODONE, sodium chloride 0.9%     Objective:     Vital Signs (Most Recent):  Temp: 98.5 °F (36.9 °C) (04/02/23 1201)  Pulse: 89 (04/02/23 1201)  Resp: 18 (04/02/23 1201)  BP: (!) 156/57 (04/02/23 1201)  SpO2: (!) 93 % (04/02/23 1201)   Vital Signs (24h Range):  Temp:  [97.9 °F (36.6 °C)-98.5 °F (36.9 °C)] 98.5 °F (36.9 °C)  Pulse:  [77-89] 89  Resp:  [14-20] 18  SpO2:  [92 %-96 %] 93 %  BP: (111-156)/(54-65) 156/57     Date 04/02/23 0700 - 04/03/23 0659   Shift 0920-3011 7006-0353 9181-8798 24 Hour Total   INTAKE   Shift Total(mL/kg)       OUTPUT   Other 50   50   Shift Total(mL/kg) 50(0.7)   50(0.7)   Weight (kg) 67 67 67 67       Physical Exam  Constitutional:       General: She is not in acute distress.     Appearance: She is not ill-appearing.   Cardiovascular:      Rate and Rhythm: Normal rate.      Comments: Right: DP Triphasic signal, PT Biphasic signal  Left: DP monophasic signal, PT monophasic signal  Pulmonary:      Effort: Pulmonary effort is normal. No respiratory distress.   Skin:     Comments: Left groin incision with wound vac in place. Good seal and on suction   Neurological:      Mental Status: She is alert.       Significant Labs:  CBC:   Recent Labs   Lab 04/02/23  0613    WBC 10.59   RBC 3.19*   HGB 8.3*   HCT 27.5*      MCV 86   MCH 26.0*   MCHC 30.2*       CMP:   Recent Labs   Lab 04/02/23  0828   *   CALCIUM 8.9   ALBUMIN 2.3*   PROT 7.4      K 3.7   CO2 28      BUN 30*   CREATININE 1.0   ALKPHOS 58   ALT 8*   AST 18   BILITOT 0.7         Significant Diagnostics:  I have reviewed all pertinent imaging results/findings within the past 24 hours.

## 2023-04-02 NOTE — PLAN OF CARE
POC reviewed with patient  - AAOx4   - L groin incision with WV in place, output sanguinous holding suction.   - WILTON drain in place with scant sanguinous drainage  - low urine output per purewick, patient encouraged to increase PO intake, very little appetite  - sitting at edge of bed rolling with assist.  - pain controlled with PRN meds  - Heparin drip infusing   - IV ABX  - no further questions at this time, WCTM

## 2023-04-03 ENCOUNTER — ANESTHESIA EVENT (OUTPATIENT)
Dept: SURGERY | Facility: HOSPITAL | Age: 83
DRG: 901 | End: 2023-04-03
Payer: MEDICARE

## 2023-04-03 LAB
ALBUMIN SERPL BCP-MCNC: 2.2 G/DL (ref 3.5–5.2)
ALP SERPL-CCNC: 57 U/L (ref 55–135)
ALT SERPL W/O P-5'-P-CCNC: 7 U/L (ref 10–44)
ANION GAP SERPL CALC-SCNC: 11 MMOL/L (ref 8–16)
APTT PPP: 128.6 SEC (ref 21–32)
APTT PPP: 29.1 SEC (ref 21–32)
APTT PPP: 35.7 SEC (ref 21–32)
AST SERPL-CCNC: 16 U/L (ref 10–40)
BACTERIA BLD CULT: NORMAL
BACTERIA BLD CULT: NORMAL
BACTERIA SPEC AEROBE CULT: ABNORMAL
BASOPHILS # BLD AUTO: 0.02 K/UL (ref 0–0.2)
BASOPHILS NFR BLD: 0.2 % (ref 0–1.9)
BILIRUB SERPL-MCNC: 0.7 MG/DL (ref 0.1–1)
BUN SERPL-MCNC: 34 MG/DL (ref 8–23)
CALCIUM SERPL-MCNC: 8.8 MG/DL (ref 8.7–10.5)
CHLORIDE SERPL-SCNC: 103 MMOL/L (ref 95–110)
CO2 SERPL-SCNC: 25 MMOL/L (ref 23–29)
CREAT SERPL-MCNC: 1.2 MG/DL (ref 0.5–1.4)
DIFFERENTIAL METHOD: ABNORMAL
EOSINOPHIL # BLD AUTO: 0.3 K/UL (ref 0–0.5)
EOSINOPHIL NFR BLD: 2.9 % (ref 0–8)
ERYTHROCYTE [DISTWIDTH] IN BLOOD BY AUTOMATED COUNT: 16 % (ref 11.5–14.5)
EST. GFR  (NO RACE VARIABLE): 45.2 ML/MIN/1.73 M^2
GLUCOSE SERPL-MCNC: 197 MG/DL (ref 70–110)
HCT VFR BLD AUTO: 26.1 % (ref 37–48.5)
HGB BLD-MCNC: 8.1 G/DL (ref 12–16)
IMM GRANULOCYTES # BLD AUTO: 0.07 K/UL (ref 0–0.04)
IMM GRANULOCYTES NFR BLD AUTO: 0.7 % (ref 0–0.5)
LYMPHOCYTES # BLD AUTO: 1.7 K/UL (ref 1–4.8)
LYMPHOCYTES NFR BLD: 17.1 % (ref 18–48)
MCH RBC QN AUTO: 26.4 PG (ref 27–31)
MCHC RBC AUTO-ENTMCNC: 31 G/DL (ref 32–36)
MCV RBC AUTO: 85 FL (ref 82–98)
MONOCYTES # BLD AUTO: 0.5 K/UL (ref 0.3–1)
MONOCYTES NFR BLD: 5.6 % (ref 4–15)
NEUTROPHILS # BLD AUTO: 7.1 K/UL (ref 1.8–7.7)
NEUTROPHILS NFR BLD: 73.5 % (ref 38–73)
NRBC BLD-RTO: 0 /100 WBC
PLATELET # BLD AUTO: 287 K/UL (ref 150–450)
PMV BLD AUTO: 10.9 FL (ref 9.2–12.9)
POCT GLUCOSE: 194 MG/DL (ref 70–110)
POCT GLUCOSE: 233 MG/DL (ref 70–110)
POCT GLUCOSE: 268 MG/DL (ref 70–110)
POCT GLUCOSE: 324 MG/DL (ref 70–110)
POTASSIUM SERPL-SCNC: 3.9 MMOL/L (ref 3.5–5.1)
PROT SERPL-MCNC: 7 G/DL (ref 6–8.4)
RBC # BLD AUTO: 3.07 M/UL (ref 4–5.4)
SODIUM SERPL-SCNC: 139 MMOL/L (ref 136–145)
WBC # BLD AUTO: 9.7 K/UL (ref 3.9–12.7)

## 2023-04-03 PROCEDURE — 20600001 HC STEP DOWN PRIVATE ROOM

## 2023-04-03 PROCEDURE — 63600175 PHARM REV CODE 636 W HCPCS: Performed by: STUDENT IN AN ORGANIZED HEALTH CARE EDUCATION/TRAINING PROGRAM

## 2023-04-03 PROCEDURE — 99233 SBSQ HOSP IP/OBS HIGH 50: CPT | Mod: ,,, | Performed by: STUDENT IN AN ORGANIZED HEALTH CARE EDUCATION/TRAINING PROGRAM

## 2023-04-03 PROCEDURE — 25000003 PHARM REV CODE 250

## 2023-04-03 PROCEDURE — 25000003 PHARM REV CODE 250: Performed by: STUDENT IN AN ORGANIZED HEALTH CARE EDUCATION/TRAINING PROGRAM

## 2023-04-03 PROCEDURE — 36415 COLL VENOUS BLD VENIPUNCTURE: CPT | Performed by: SURGERY

## 2023-04-03 PROCEDURE — 85730 THROMBOPLASTIN TIME PARTIAL: CPT | Performed by: STUDENT IN AN ORGANIZED HEALTH CARE EDUCATION/TRAINING PROGRAM

## 2023-04-03 PROCEDURE — 80053 COMPREHEN METABOLIC PANEL: CPT

## 2023-04-03 PROCEDURE — 51798 US URINE CAPACITY MEASURE: CPT

## 2023-04-03 PROCEDURE — 36415 COLL VENOUS BLD VENIPUNCTURE: CPT | Performed by: STUDENT IN AN ORGANIZED HEALTH CARE EDUCATION/TRAINING PROGRAM

## 2023-04-03 PROCEDURE — 85025 COMPLETE CBC W/AUTO DIFF WBC: CPT

## 2023-04-03 PROCEDURE — 63600175 PHARM REV CODE 636 W HCPCS: Performed by: SURGERY

## 2023-04-03 PROCEDURE — 25000003 PHARM REV CODE 250: Performed by: SURGERY

## 2023-04-03 PROCEDURE — 99233 PR SUBSEQUENT HOSPITAL CARE,LEVL III: ICD-10-PCS | Mod: ,,, | Performed by: STUDENT IN AN ORGANIZED HEALTH CARE EDUCATION/TRAINING PROGRAM

## 2023-04-03 PROCEDURE — 25000003 PHARM REV CODE 250: Performed by: INTERNAL MEDICINE

## 2023-04-03 PROCEDURE — 63600175 PHARM REV CODE 636 W HCPCS: Performed by: INTERNAL MEDICINE

## 2023-04-03 PROCEDURE — 85730 THROMBOPLASTIN TIME PARTIAL: CPT | Mod: 91 | Performed by: SURGERY

## 2023-04-03 PROCEDURE — 25000003 PHARM REV CODE 250: Performed by: PHYSICIAN ASSISTANT

## 2023-04-03 RX ADMIN — OXYCODONE HYDROCHLORIDE 5 MG: 5 TABLET ORAL at 10:04

## 2023-04-03 RX ADMIN — CEFEPIME 2 G: 2 INJECTION, POWDER, FOR SOLUTION INTRAVENOUS at 02:04

## 2023-04-03 RX ADMIN — PANTOPRAZOLE SODIUM 40 MG: 40 TABLET, DELAYED RELEASE ORAL at 08:04

## 2023-04-03 RX ADMIN — CLOPIDOGREL BISULFATE 75 MG: 75 TABLET ORAL at 08:04

## 2023-04-03 RX ADMIN — INSULIN ASPART 2 UNITS: 100 INJECTION, SOLUTION INTRAVENOUS; SUBCUTANEOUS at 12:04

## 2023-04-03 RX ADMIN — ATORVASTATIN CALCIUM 40 MG: 40 TABLET, FILM COATED ORAL at 09:04

## 2023-04-03 RX ADMIN — ACETAMINOPHEN 650 MG: 325 TABLET ORAL at 05:04

## 2023-04-03 RX ADMIN — INSULIN ASPART 4 UNITS: 100 INJECTION, SOLUTION INTRAVENOUS; SUBCUTANEOUS at 03:04

## 2023-04-03 RX ADMIN — DULOXETINE 30 MG: 30 CAPSULE, DELAYED RELEASE ORAL at 08:04

## 2023-04-03 RX ADMIN — ASPIRIN 81 MG: 81 TABLET, COATED ORAL at 08:04

## 2023-04-03 RX ADMIN — CARVEDILOL 12.5 MG: 12.5 TABLET, FILM COATED ORAL at 09:04

## 2023-04-03 RX ADMIN — ACETAMINOPHEN 650 MG: 325 TABLET ORAL at 10:04

## 2023-04-03 RX ADMIN — FLUCONAZOLE 400 MG: 200 TABLET ORAL at 08:04

## 2023-04-03 RX ADMIN — INSULIN ASPART 8 UNITS: 100 INJECTION, SOLUTION INTRAVENOUS; SUBCUTANEOUS at 10:04

## 2023-04-03 RX ADMIN — ACETAMINOPHEN 650 MG: 325 TABLET ORAL at 12:04

## 2023-04-03 RX ADMIN — CARVEDILOL 12.5 MG: 12.5 TABLET, FILM COATED ORAL at 08:04

## 2023-04-03 RX ADMIN — HEPARIN SODIUM 21 UNITS/KG/HR: 10000 INJECTION, SOLUTION INTRAVENOUS at 10:04

## 2023-04-03 RX ADMIN — AMLODIPINE BESYLATE 10 MG: 10 TABLET ORAL at 08:04

## 2023-04-03 RX ADMIN — DULOXETINE 30 MG: 30 CAPSULE, DELAYED RELEASE ORAL at 09:04

## 2023-04-03 RX ADMIN — OXYCODONE HYDROCHLORIDE 5 MG: 5 TABLET ORAL at 05:04

## 2023-04-03 RX ADMIN — Medication: at 08:04

## 2023-04-03 RX ADMIN — PANTOPRAZOLE SODIUM 40 MG: 40 TABLET, DELAYED RELEASE ORAL at 09:04

## 2023-04-03 RX ADMIN — FUROSEMIDE 20 MG: 20 TABLET ORAL at 08:04

## 2023-04-03 RX ADMIN — MICAFUNGIN SODIUM 100 MG: 100 INJECTION, POWDER, LYOPHILIZED, FOR SOLUTION INTRAVENOUS at 09:04

## 2023-04-03 RX ADMIN — HEPARIN SODIUM 16 UNITS/KG/HR: 10000 INJECTION, SOLUTION INTRAVENOUS at 02:04

## 2023-04-03 RX ADMIN — DAPTOMYCIN 535 MG: 350 INJECTION, POWDER, LYOPHILIZED, FOR SOLUTION INTRAVENOUS at 05:04

## 2023-04-03 NOTE — PROGRESS NOTES
Plastic and Reconstructive Surgery   Progress Note    Subjective:      Patient seen and examined at bedside. No acute event overnight. Patient with bilateral leg pain. Patient reports not urinating much.    Objective:  Vital signs in last 24 hours:  Temp:  [97.6 °F (36.4 °C)-98.6 °F (37 °C)] 97.6 °F (36.4 °C)  Pulse:  [74-89] 74  Resp:  [14-18] 18  SpO2:  [91 %-95 %] 93 %  BP: ()/(53-93) 99/60    Intake/Output last 3 shifts:  I/O last 3 completed shifts:  In: 300 [P.O.:300]  Out: 390 [Urine:200; Drains:15; Other:175]    Intake/Output this shift:  No intake/output data recorded.        Physical Exam:  VITAL SIGNS:   Vitals:    23 1945 23 2356 23 0443 23 0531   BP: (!) 132/93 (!) 110/53 99/60    BP Location: Right arm Right arm Right arm    Patient Position: Lying Lying Lying    Pulse: 87 76 74    Resp: 18 18 18 18   Temp: 98.6 °F (37 °C) 98.1 °F (36.7 °C) 97.6 °F (36.4 °C)    TempSrc: Oral Oral Axillary    SpO2: (!) 91% 95% (!) 93%    Weight:       Height:         TMAX: Temp (24hrs), Av.2 °F (36.8 °C), Min:97.6 °F (36.4 °C), Max:98.6 °F (37 °C)    General: Alert; No acute distress  Cardiovascular: Regular rate   Respiratory: Normal respiratory effort. Chest rise symmetric.   Abdomen: Soft, nontender, nondistended  Extremity: left groin/thigh with wound vac in place  Neurologic: No focal deficit. Speech normal      Scheduled Medications acetaminophen, 650 mg, Q6H  amLODIPine, 10 mg, Daily  ammonium lactate, , Daily  aspirin, 81 mg, Daily  atorvastatin, 40 mg, QHS  carvediloL, 12.5 mg, BID  ceFEPime (MAXIPIME) IVPB, 2 g, Q12H  clopidogreL, 75 mg, Daily  DAPTOmycin (CUBICIN) IV (PEDS and ADULTS), 6 mg/kg, Q24H  DULoxetine, 30 mg, BID  fluconazole, 400 mg, Daily  furosemide, 20 mg, Daily  pantoprazole, 40 mg, BID      PRN Medications sodium chloride, dextrose 10%, glucagon (human recombinant), heparin (PORCINE), heparin (PORCINE), insulin aspart U-100, ondansetron, oxyCODONE, sodium  chloride 0.9%    Recent Labs:   Lab Results   Component Value Date    WBC 9.70 04/03/2023    HGB 8.1 (L) 04/03/2023    HCT 26.1 (L) 04/03/2023    MCV 85 04/03/2023     04/03/2023     Lab Results   Component Value Date     (H) 04/03/2023     04/03/2023    K 3.9 04/03/2023     04/03/2023    BUN 34 (H) 04/03/2023         Assessment: 82 y.o. y/o female 3 Days Post-Op s/p Procedure(s):  DEBRIDEMENT, WOUND  CREATION, FLAP, MUSCLE ROTATION Doing well postoperatively.    Plan  Diabetic diet  IV abx  Pain control  ASA/plavix/hep drip  Wound vac to stay on at all times for 7 days, will remove 4/7  F/u vascular recs  Will plan for skin graft pending graft eval 4/7  Medical management as per primary  Discussed with Dr. Sherice Ireland DO- Fellow  Department of Plastic and Reconstructive Surgery  [phone] (office)

## 2023-04-03 NOTE — ANESTHESIA PREPROCEDURE EVALUATION
[facility]  Anesthesia Pre-Operative Evaluation   04/03/2023        Nelsy Marino, 1940  [id number]  Procedure(s) (LRB):  Angiogram Extremity Unilateral - possible endovascular intervention (Left)    Subjective    Nelsy Marino is a 82 y.o. female w/ a significant PMHx of  PAD, HTN, HFpEF, T2DM, RLE bypass 03/02 with R TMA 03/04 who was initially admitted for L groin incision dehiscence s/p debridement.     Patient now presents for above procedure(s).     Nuclear Stress Test 2/28/23    Normal myocardial perfusion scan. There is no evidence of myocardial ischemia or infarction.    The visually estimated ejection fraction is normal during stress.    There is normal wall motion post stress.      TTE 3/9/23:   The estimated ejection fraction is 65%.   The left ventricle is normal in size with normal systolic function.   Grade II left ventricular diastolic dysfunction.   Normal right ventricular size with normal right ventricular systolic function.   Mild left atrial enlargement.   Mild-to-moderate mitral regurgitation.   There is pulmonary hypertension.   The estimated PA systolic pressure is 55 mmHg.   Intermediate central venous pressure (8 mmHg).    Prev Airway:   Intubation:     Induction:  Intravenous    Intubated:  Postinduction    Mask Ventilation:  Easy with oral airway    Attempts:  1    Attempted By:  Staff anesthesiologist    Method of Intubation:  Direct    Blade:  Mendez 2    Laryngeal View Grade: Grade I - full view of cords      Difficult Airway Encountered?: No      Complications:  None    Airway Device:  Oral endotracheal tube    Airway Device Size:  7.0    Style/Cuff Inflation:  Cuffed (inflated to minimal occlusive pressure)    Secured at:  The lips    LDA:        Peripheral IV - Single Lumen 03/29/23 2045 20 G Anterior;Left Wrist (Active)   Site Assessment Clean;Dry;Intact;No redness;No swelling 04/03/23 0823   Extremity Assessment Distal to IV No warmth;No  swelling;No redness;No abnormal discoloration 04/03/23 0823   Line Status Infusing 04/03/23 0823   Dressing Status Clean;Dry;Intact 04/03/23 0823   Dressing Intervention Integrity maintained 04/03/23 0823   Dressing Change Due 04/02/23 04/03/23 0823   Site Change Due 04/02/23 04/03/23 0823   Reason Not Rotated Patient refused 04/03/23 0823   Number of days: 4            Peripheral IV - Single Lumen 03/29/23 2322 22 G Anterior;Distal;Right Forearm (Active)   Site Assessment Clean;Dry;Intact;No redness;No swelling 04/03/23 0823   Extremity Assessment Distal to IV No warmth;No swelling;No redness;No abnormal discoloration 04/03/23 0823   Line Status Flushed;Saline locked 04/03/23 0823   Dressing Status Clean;Dry;Intact 04/03/23 0823   Dressing Intervention Integrity maintained 04/03/23 0823   Dressing Change Due 04/02/23 04/03/23 0823   Site Change Due 04/02/23 04/03/23 0823   Reason Not Rotated Patient refused 04/03/23 0823   Number of days: 4            Peripheral IV - Single Lumen 03/30/23 1226 22 G Left Antecubital (Active)   Site Assessment Clean;Intact;Dry;No redness;No swelling 04/03/23 0823   Extremity Assessment Distal to IV No warmth;No swelling;No redness;No abnormal discoloration 04/03/23 0823   Line Status Flushed;Saline locked 04/03/23 0823   Dressing Status Clean;Dry;Intact 04/03/23 0823   Dressing Intervention Integrity maintained 04/03/23 0823   Dressing Change Due 04/03/23 04/03/23 0823   Site Change Due 04/03/23 04/03/23 0823   Reason Not Rotated Not due 04/03/23 0823   Number of days: 3            Closed/Suction Drain 03/31/23 0953 Left;Anterior;Lateral Thigh Bulb 15 Fr. (Active)   Site Description Healing 04/03/23 0823   Dressing Type Gauze 04/02/23 1945   Dressing Status Clean;Dry;Intact 04/03/23 0823   Dressing Intervention Integrity maintained 04/03/23 0823   Drainage Serosanguineous;Red streaked 04/03/23 0823   Status To bulb suction 04/03/23 0823   Output (mL) 0 mL 04/03/23 0530   Number of  days: 3       Drips:    heparin (porcine) in D5W 19 Units/kg/hr (04/03/23 8609)       Patient Active Problem List   Diagnosis    Lichen sclerosus et atrophicus    PVD (peripheral vascular disease)    Essential hypertension    Type 2 diabetes mellitus with chronic kidney disease, with long-term current use of insulin    Diastolic CHF, chronic    Obesity (BMI 30-39.9)    Critical right lower limb ischemia    Acute hypoxemic respiratory failure    History of DVT (deep vein thrombosis)    Wound dehiscence    Fluid collection at surgical site    Cellulitis    Postoperative seroma of musculoskeletal structure after non-musculoskeletal procedure    Abscess of right groin    Critical limb ischemia with history of revascularization of same extremity    Sepsis    Hyponatremia    Vascular graft infection    S/P transmetatarsal amputation of foot, right    Leukocytosis    Acute encephalopathy    Bilious vomiting    Upper GI bleed    Diabetic foot infection    Melena    Eschar of foot       Review of patient's allergies indicates:   Allergen Reactions    Motrin [ibuprofen] Itching       Current Inpatient Medications:    acetaminophen  650 mg Oral Q6H    amLODIPine  10 mg Oral Daily    ammonium lactate   Topical (Top) Daily    aspirin  81 mg Oral Daily    atorvastatin  40 mg Oral QHS    carvediloL  12.5 mg Oral BID    ceFEPime (MAXIPIME) IVPB  2 g Intravenous Q12H    clopidogreL  75 mg Oral Daily    DAPTOmycin (CUBICIN) IV (PEDS and ADULTS)  6 mg/kg Intravenous Q24H    DULoxetine  30 mg Oral BID    fluconazole  400 mg Oral Daily    furosemide  20 mg Oral Daily    pantoprazole  40 mg Oral BID       No current facility-administered medications on file prior to encounter.     Current Outpatient Medications on File Prior to Encounter   Medication Sig Dispense Refill    carvediloL (COREG) 12.5 MG tablet Take 12.5 mg by mouth 2 (two) times daily.      amlodipine (NORVASC) 10 MG tablet Take 10  "mg by mouth once daily.      apixaban (ELIQUIS) 5 mg Tab Take 1 tablet (5 mg total) by mouth 2 (two) times daily. 60 tablet 0    aspirin (ECOTRIN) 81 MG EC tablet Take 81 mg by mouth once daily.      atorvastatin (LIPITOR) 40 MG tablet Take 1 tablet (40 mg total) by mouth every evening. 30 tablet 0    BLOOD SUGAR DIAGNOSTIC (TRUE METRIX GLUCOSE TEST STRIP MISC) by Misc.(Non-Drug; Combo Route) route.      cephALEXin (KEFLEX) 500 MG capsule Take 2 capsules (1,000 mg total) by mouth every 12 (twelve) hours. 60 capsule 0    clopidogreL (PLAVIX) 75 mg tablet Take 1 tablet (75 mg total) by mouth once daily. 30 tablet 0    DULoxetine (CYMBALTA) 30 MG capsule Take 1 capsule (30 mg total) by mouth 2 (two) times daily. 60 capsule 0    furosemide (LASIX) 20 MG tablet Take 1 tablet (20 mg total) by mouth once daily. 30 tablet 0    insulin aspart protamine-insulin aspart (NOVOLOG MIX 70-30FLEXPEN U-100) 100 unit/mL (70-30) InPn pen Inject 12 Units into the skin 2 (two) times daily before meals.  0    insulin syringe-needle,dispos. 0.3 mL 30 gauge x 5/16" Syrg by Misc.(Non-Drug; Combo Route) route.      losartan-hydrochlorothiazide 100-25 mg (HYZAAR) 100-25 mg per tablet Take 1 tablet by mouth once daily.      metformin (GLUCOPHAGE) 1000 MG tablet Take 1,000 mg by mouth 2 (two) times daily with meals.      ondansetron (ZOFRAN) 8 MG tablet   0    pantoprazole (PROTONIX) 40 MG tablet Take 1 tablet (40 mg total) by mouth 2 (two) times daily. 60 tablet 1    pen needle, diabetic 32 gauge x 5/32" Ndle Inject 1 each into the skin 4 (four) times daily. 100 each 0       Past Surgical History:   Procedure Laterality Date     femoral vascular surgery       ANGIOGRAPHY OF LOWER EXTREMITY Right 2/23/2023    Procedure: Angiogram Extremity Unilateral;  Surgeon: Danny Dunbar MD;  Location: [facility];  Service: Vascular;  Laterality: Right;  RN PHONE PREOP 2/20/2023----CK CONSENT    CREATION OF FEMORAL-FEMORAL ARTERY " BYPASS WITH GRAFT Bilateral 3/2/2023    Procedure: CREATION, BYPASS, ARTERIAL, FEMORAL TO FEMORAL, USING GRAFT, LEFT FEMORAL ENDARTERECTOMY, LEFT ILIAC ARTERY STENT PLACEMENT, RIGHT LOWER EXTREMITY ANGIOGRAM AND RIGHT SFA  ANGIOPLASTY;  Surgeon: Danny Dunbar MD;  Location: [address];  Service: Vascular;  Laterality: Bilateral;   744.47 MGY  175.36 Gycm  Flouro time 20.5 mins      CREATION OF ILIOFEMORAL ARTERY BYPASS Right 3/20/2019    Procedure: CREATION, BYPASS, ARTERIAL, ILIAC TO FEMORAL, RIGHT LOWER EXTREMITY, RIGHT PROFUNDAPLASTY;  Surgeon: Danny Dunbar MD;  Location: [facility];  Service: Vascular;  Laterality: Right;  11:00AM START PER ANNE RICHARDS PREOP 3/13/2019  ACT'S, CELL SAVER, GRAFTS, BOOK WATER---NEED H/P  CONSENT IN AM------HGBA1C    CREATION OF MUSCLE ROTATIONAL FLAP Left 3/31/2023    Procedure: CREATION, FLAP, MUSCLE ROTATION;  Surgeon: Brandon Smiley MD;  Location: [address];  Service: Plastics;  Laterality: Left;    ESOPHAGOGASTRODUODENOSCOPY N/A 3/17/2023    Procedure: EGD (ESOPHAGOGASTRODUODENOSCOPY);  Surgeon: Gee Rodriguez MD;  Location: [facility] ([address] [place] [address]);  Service: Endoscopy;  Laterality: N/A;    FOOT AMPUTATION THROUGH METATARSAL Right 3/4/2023    Procedure: AMPUTATION, FOOT, TRANSMETATARSAL;  Surgeon: Benjamin Godfrey DPM;  Location: [address];  Service: Podiatry;  Laterality: Right;    HYSTERECTOMY      ?unsure cervix present    INCISION AND DRAINAGE Right 4/12/2019    Procedure: Incision and Drainage - R groin washout, possible muscle flap;  Surgeon: Danny Dunbar MD;  [facility];  Service: Vascular;  Laterality: Right;  groin washout    INCISION AND DRAINAGE OF GROIN Right 5/3/2019    Procedure: Incision and Drainage R groin;  Surgeon: Danny Dunbar MD;  Location: [facility] OR [address] [place] [address];  Service: Vascular;  Laterality: Right;    WOUND DEBRIDEMENT Left 3/31/2023    Procedure: DEBRIDEMENT, WOUND;  Surgeon:  René Simms MD;  Location[facility] OR [address] [place] [address];  Service: Vascular;  Laterality: Left;       Social History:  Tobacco Use: Medium Risk    Smoking Tobacco Use: Former    Smokeless Tobacco Use: Never    Passive Exposure: Not on file       Alcohol Use: Unknown    Frequency of Alcohol Consumption: Patient refused    Average Number of Drinks: Patient refused    Frequency of Binge Drinking: Patient refused       Objective    Vital Signs Range:  BMI Readings from Last 1 Encounters:   03/31/23 25.35 kg/m²       Temp:  [36.3 °C (97.3 °F)-36.7 °C (98.1 °F)]   Pulse:  [74-81]   Resp:  [18]   BP: ()/(53-60)   SpO2:  [93 %-95 %]        Significant Labs:        Component Value Date/Time    WBC 9.70 04/03/2023 0230    HGB 8.1 (L) 04/03/2023 0230    HCT 26.1 (L) 04/03/2023 0230    HCT 25 (L) 03/12/2023 2054     04/03/2023 0230     04/03/2023 0230    K 3.9 04/03/2023 0230     04/03/2023 0230    CO2 25 04/03/2023 0230     (H) 04/03/2023 0230    BUN 34 (H) 04/03/2023 0230    CREATININE 1.2 04/03/2023 0230    MG 2.1 03/30/2023 0342    PHOS 3.8 03/30/2023 0342    CALCIUM 8.8 04/03/2023 0230    ALBUMIN 2.2 (L) 04/03/2023 0230    PROT 7.0 04/03/2023 0230    ALKPHOS 57 04/03/2023 0230    BILITOT 0.7 04/03/2023 0230    AST 16 04/03/2023 0230    ALT 7 (L) 04/03/2023 0230    INR 1.2 03/29/2023 2105    HGBA1C 8.2 (H) 02/28/2023 0549        Please see Results Review for additional labs.     Diagnostic Studies: All relevant studies, reviewed.      EKG:   Results for orders placed or performed during the hospital encounter of 03/29/23   EKG 12-lead    Collection Time: 03/29/23  8:18 PM    Narrative    Test Reason : M79.606,    Vent. Rate : 088 BPM     Atrial Rate : 088 BPM     P-R Int : 172 ms          QRS Dur : 080 ms      QT Int : 342 ms       P-R-T Axes : 051 039 051 degrees     QTc Int : 413 ms    Normal sinus rhythm  Normal ECG  When compared with ECG of 08-MAR-2023 19:20,  Non-specific change in ST  segment in Inferior leads  Nonspecific T wave abnormality, improved in Inferior leads  T wave inversion no longer evident in Lateral leads  Confirmed by Luke Forman MD ([id number]) on 3/30/2023 2:52:23 PM    Referred By: [email or web address]   SELF           Confirmed By:Luke Forman MD       ECHO:  Results for orders placed during the hospital encounter of 02/27/23    Echo    Interpretation Summary  · The estimated ejection fraction is 65%.  · The left ventricle is normal in size with normal systolic function.  · Grade II left ventricular diastolic dysfunction.  · Normal right ventricular size with normal right ventricular systolic function.  · Mild left atrial enlargement.  · Mild-to-moderate mitral regurgitation.  · There is pulmonary hypertension.  · The estimated PA systolic pressure is 55 mmHg.  · Intermediate central venous pressure (8 mmHg).      Pre-op Assessment    I have reviewed the Patient Summary Reports.     I have reviewed the Nursing Notes. I have reviewed the NPO Status.   I have reviewed the Medications.     Review of Systems  Anesthesia Hx:  No problems with previous Anesthesia  History of prior surgery of interest to airway management or planning: Denies Family Hx of Anesthesia complications.   Denies Personal Hx of Anesthesia complications.   Hematology/Oncology:     Oncology Normal    -- Anemia:   EENT/Dental:EENT/Dental Normal   Cardiovascular:   Hypertension CHF PVD hyperlipidemia    Pulmonary:  Pulmonary Normal    Renal/:   Chronic Renal Disease    Hepatic/GI:  Hepatic/GI Normal    Neurological:  Neurology Normal    Endocrine:   Diabetes, type 2        Physical Exam  General: Well nourished, Cooperative, Alert and Oriented    Airway:  Mallampati: III   Mouth Opening: Normal  TM Distance: Normal  Tongue: Normal  Neck ROM: Normal ROM    Dental:  Dentures        Anesthesia Plan  Type of Anesthesia, risks & benefits discussed:    Anesthesia Type: Gen ETT, Gen Natural Airway, MAC  Intra-op Monitoring  Plan: Standard ASA Monitors  Post Op Pain Control Plan: multimodal analgesia  Induction:  IV  Airway Plan: Direct and Video, Post-Induction  ASA Score: 3  Day of Surgery Review of History & Physical: H&P Update referred to the surgeon/provider.    Ready For Surgery From Anesthesia Perspective.     .

## 2023-04-03 NOTE — PROGRESS NOTES
[facility]  Vascular Surgery  Progress Note    Patient Name: Nelsy Marino  MRN: [id number]  Admission Date: 3/29/2023  Primary Care Provider: Juan F Garay    Subjective:     Interval History: Patient groggy this am on rounds after taking oxycodone, no complaints from her at this time.   at bedside, to consult for angio later today     Post-Op Info:  Procedure(s) (LRB):  DEBRIDEMENT, WOUND (Left)  CREATION, FLAP, MUSCLE ROTATION (Left)   3 Days Post-Op       Medications:  Continuous Infusions:   heparin (porcine) in D5W 19 Units/kg/hr (04/03/23 0508)     Scheduled Meds:   acetaminophen  650 mg Oral Q6H    amLODIPine  10 mg Oral Daily    ammonium lactate   Topical (Top) Daily    aspirin  81 mg Oral Daily    atorvastatin  40 mg Oral QHS    carvediloL  12.5 mg Oral BID    ceFEPime (MAXIPIME) IVPB  2 g Intravenous Q12H    clopidogreL  75 mg Oral Daily    DAPTOmycin (CUBICIN) IV (PEDS and ADULTS)  6 mg/kg Intravenous Q24H    DULoxetine  30 mg Oral BID    fluconazole  400 mg Oral Daily    furosemide  20 mg Oral Daily    pantoprazole  40 mg Oral BID     PRN Meds:sodium chloride, dextrose 10%, glucagon (human recombinant), heparin (PORCINE), heparin (PORCINE), insulin aspart U-100, ondansetron, oxyCODONE, sodium chloride 0.9%     Objective:     Vital Signs (Most Recent):  Temp: 97.6 °F (36.4 °C) (04/03/23 0443)  Pulse: 74 (04/03/23 0443)  Resp: 18 (04/03/23 0531)  BP: 99/60 (04/03/23 0443)  SpO2: (!) 93 % (04/03/23 0443)   Vital Signs (24h Range):  Temp:  [97.6 °F (36.4 °C)-98.6 °F (37 °C)] 97.6 °F (36.4 °C)  Pulse:  [74-89] 74  Resp:  [14-18] 18  SpO2:  [91 %-95 %] 93 %  BP: ()/(53-93) 99/60         Physical Exam  Constitutional:       General: She is not in acute distress.     Appearance: She is not ill-appearing.   Cardiovascular:      Rate and Rhythm: Normal rate.      Comments: Right: DP Triphasic signal, PT Biphasic signal  Left: DP monophasic signal, PT monophasic signal  Pulmonary:       Effort: Pulmonary effort is normal. No respiratory distress.   Skin:     Comments: Left groin incision with wound vac in place. Good seal and on suction   Neurological:      Mental Status: She is alert.       Significant Labs:  CBC:   Recent Labs   Lab 04/03/23  0230   WBC 9.70   RBC 3.07*   HGB 8.1*   HCT 26.1*      MCV 85   MCH 26.4*   MCHC 31.0*     CMP:   Recent Labs   Lab 04/03/23  0230   *   CALCIUM 8.8   ALBUMIN 2.2*   PROT 7.0      K 3.9   CO2 25      BUN 34*   CREATININE 1.2   ALKPHOS 57   ALT 7*   AST 16   BILITOT 0.7       Significant Diagnostics:  I have reviewed all pertinent imaging results/findings within the past 24 hours.    Assessment/Plan:     PVD (peripheral vascular disease)  Nelsy Marino is a 82 y.o. woman with history of HTN, T2DM, DVT, and PAD who underwent left to right fem to SFA bypass and right SFA angioplasty on 3/2/23 and was admitted until 3/20. She was seen by Dr. Dunbar in the office earlier today complaining of left rest pain and also found to have dehiscence of her left groin incision.  S/p wound washout and flip with plastics 3/31    - bed rest and wound vac on at all times, per plastics  - cont heparin gtt  - ID following. cont dapto and cefepime     - f/u OR wound cultures. Gram stain no organisms   - cardiac and diabetic diet. boosts   - Insulin SS  - Home meds  - Asa plavix statin  - Podiatry consulted for right TMA wound    Plan for OR Tuesday (4/4) for endo revascularization of short segment occlusion left CFA  - to perform dylan's test  - to be consent and marked today  - NPO MN tonight 4/3        Samantha Ledesma NP  Vascular Surgery  [facility]    I have reviewed the notes, assessments, and/or procedures performed by NP, I concur with her/his documentation of Nelsy Marino.

## 2023-04-03 NOTE — ASSESSMENT & PLAN NOTE
Nelsy Marino is a 82 y.o. woman with history of HTN, T2DM, DVT, and PAD who underwent left to right fem to SFA bypass and right SFA angioplasty on 3/2/23 and was admitted until 3/20. She was seen by Dr. Dunbar in the office earlier today complaining of left rest pain and also found to have dehiscence of her left groin incision.  S/p wound washout and flip with plastics 3/31    - bed rest and wound vac on at all times, per plastics  - cont heparin gtt  - ID following. cont dapto and cefepime     - f/u OR wound cultures. Gram stain no organisms   - cardiac and diabetic diet. boosts   - Insulin SS  - Home meds  - Asa plavix statin  - Podiatry consulted for right TMA wound    Plan for OR Tuesday (4/4) for endo revascularization of short segment occlusion left CFA  - to perform dylan's test  - to be consent and marked today  - NPO MN tonight 4/3

## 2023-04-03 NOTE — SUBJECTIVE & OBJECTIVE
Medications:  Continuous Infusions:   heparin (porcine) in D5W 19 Units/kg/hr (04/03/23 0508)     Scheduled Meds:   acetaminophen  650 mg Oral Q6H    amLODIPine  10 mg Oral Daily    ammonium lactate   Topical (Top) Daily    aspirin  81 mg Oral Daily    atorvastatin  40 mg Oral QHS    carvediloL  12.5 mg Oral BID    ceFEPime (MAXIPIME) IVPB  2 g Intravenous Q12H    clopidogreL  75 mg Oral Daily    DAPTOmycin (CUBICIN) IV (PEDS and ADULTS)  6 mg/kg Intravenous Q24H    DULoxetine  30 mg Oral BID    fluconazole  400 mg Oral Daily    furosemide  20 mg Oral Daily    pantoprazole  40 mg Oral BID     PRN Meds:sodium chloride, dextrose 10%, glucagon (human recombinant), heparin (PORCINE), heparin (PORCINE), insulin aspart U-100, ondansetron, oxyCODONE, sodium chloride 0.9%     Objective:     Vital Signs (Most Recent):  Temp: 97.6 °F (36.4 °C) (04/03/23 0443)  Pulse: 74 (04/03/23 0443)  Resp: 18 (04/03/23 0531)  BP: 99/60 (04/03/23 0443)  SpO2: (!) 93 % (04/03/23 0443)   Vital Signs (24h Range):  Temp:  [97.6 °F (36.4 °C)-98.6 °F (37 °C)] 97.6 °F (36.4 °C)  Pulse:  [74-89] 74  Resp:  [14-18] 18  SpO2:  [91 %-95 %] 93 %  BP: ()/(53-93) 99/60         Physical Exam  Constitutional:       General: She is not in acute distress.     Appearance: She is not ill-appearing.   Cardiovascular:      Rate and Rhythm: Normal rate.      Comments: Right: DP Triphasic signal, PT Biphasic signal  Left: DP monophasic signal, PT monophasic signal  Pulmonary:      Effort: Pulmonary effort is normal. No respiratory distress.   Skin:     Comments: Left groin incision with wound vac in place. Good seal and on suction   Neurological:      Mental Status: She is alert.       Significant Labs:  CBC:   Recent Labs   Lab 04/03/23  0230   WBC 9.70   RBC 3.07*   HGB 8.1*   HCT 26.1*      MCV 85   MCH 26.4*   MCHC 31.0*     CMP:   Recent Labs   Lab 04/03/23  0230   *   CALCIUM 8.8   ALBUMIN 2.2*   PROT 7.0      K 3.9   CO2 25   CL  103   BUN 34*   CREATININE 1.2   ALKPHOS 57   ALT 7*   AST 16   BILITOT 0.7       Significant Diagnostics:  I have reviewed all pertinent imaging results/findings within the past 24 hours.

## 2023-04-03 NOTE — NURSING
Attempted to notify Vascular- Bettina Recinos and on call group via secure message and call out of pt's APTT reading. Awaiting call back. Heparin drip held @ 1217. Pt AAOx4, vitals wnl,  no signs of bleeding noted. Educated pt of bleeding risks. Will continue to monitor pt.

## 2023-04-03 NOTE — PLAN OF CARE
POC reviewed with patient and family member at bedside. AAOx4, able to assist in her care. VSS on RA with no complaints of SOB, headaches, or dizziness. Urine output per female external urethral catheter - PureWick, no output entire evening shift, bladder scan 295 mL end of shift, surgery intern informed, no action at this time. Left groin wound vac intact holding at 125 entire shift. Cardiac Diet, no intake during evening shift. No bowel movements and no complaints of nausea or vomiting. Pain controlled with prn pain medications. Blood glucose monitored q6h, covered by sliding scale insulin aspart. Heparin Aptt within therapeutic range until AM labs when Aptt dropped requiring heparin bolus and increased rate. Aware patient is to be NPO midnight in preparation for Tuesday AM. Resting with side rails up and call light within reach. Continuing to monitor patient status.

## 2023-04-03 NOTE — PLAN OF CARE
CM sent off wound vac form to [facility] for patient's home wound vac.    Sonya Villalpando RN, CM   Ext: [phone]

## 2023-04-03 NOTE — NURSING
Attempted to page next up on call for Dr. Dunbar on call team for aptt results. Awaiting call back.

## 2023-04-03 NOTE — PLAN OF CARE
[facility]  Discharge Reassessment    Primary Care Provider: Juan F Garay    Expected Discharge Date: 4/5/2023    Reassessment (most recent)       Discharge Reassessment - 04/03/23 1457          Discharge Reassessment    Assessment Type Discharge Planning Reassessment     Discharge Plan A Home Health     Discharge Plan B Home     DME Needed Upon Discharge  negative pressure wound therapy device     Discharge Barriers Identified None     Why the patient remains in the hospital Requires continued medical care                     Sonya Villalpando RN, CM   Ext: [phone]

## 2023-04-03 NOTE — ASSESSMENT & PLAN NOTE
Patient with history of left to right fem to SFA bypass and right SFA angioplasty on 03/2/23; 8mm life-stent in place. She has developed dehiscence of her left groin decision.  CTA showed soft tissue stranding and trace fluid around the left femoral artery. No organized fluid collection. S/P OR 3/31 with vascular surgery for debridement of wound.   OR L groin wound cultures +C glabrata, and +C albicans; underlying graft exposed.    Vasc surg: plans (04/04) for endovasc of short segment occlusion L CFA.  Plastics: plans for skin graft pending eval (04/07).    Recommendations / Plan:  · Discontinue oral fluconazole; to start IV-micafungin 100mg Q24 (cxs +C.glabrata; +C.albicans)  · Discontinue Vancomycin   · Continue IV-cefepime for now   -- [Note: recent h/o R foot TMA wound dehiscence cx+ PSA (03/10/23), s/p cipro course for SSTI].   -- Podiatry following for TMA wound management.   · For tx of infection of native graft (w/ stent in place), anticipate 6wk duration s/p last vasc surgery (04/04)     -- Discussed with ID staff and primary team   -- ID will continue to follow w/ further recs.

## 2023-04-04 ENCOUNTER — ANESTHESIA (OUTPATIENT)
Dept: SURGERY | Facility: HOSPITAL | Age: 83
DRG: 901 | End: 2023-04-04
Payer: MEDICARE

## 2023-04-04 LAB
ABO + RH BLD: ABNORMAL
ALBUMIN SERPL BCP-MCNC: 2 G/DL (ref 3.5–5.2)
ALP SERPL-CCNC: 61 U/L (ref 55–135)
ALT SERPL W/O P-5'-P-CCNC: 8 U/L (ref 10–44)
ANION GAP SERPL CALC-SCNC: 9 MMOL/L (ref 8–16)
APTT PPP: 29.9 SEC (ref 21–32)
APTT PPP: 34.1 SEC (ref 21–32)
APTT PPP: 99.4 SEC (ref 21–32)
AST SERPL-CCNC: 19 U/L (ref 10–40)
BACTERIA SPEC ANAEROBE CULT: NORMAL
BASOPHILS # BLD AUTO: 0.01 K/UL (ref 0–0.2)
BASOPHILS # BLD AUTO: 0.02 K/UL (ref 0–0.2)
BASOPHILS NFR BLD: 0.1 % (ref 0–1.9)
BASOPHILS NFR BLD: 0.2 % (ref 0–1.9)
BILIRUB SERPL-MCNC: 0.6 MG/DL (ref 0.1–1)
BLD GP AB SCN CELLS X3 SERPL QL: ABNORMAL
BLD PROD TYP BPU: NORMAL
BLOOD UNIT EXPIRATION DATE: NORMAL
BLOOD UNIT TYPE CODE: 5100
BLOOD UNIT TYPE: NORMAL
BUN SERPL-MCNC: 41 MG/DL (ref 8–23)
CALCIUM SERPL-MCNC: 8.7 MG/DL (ref 8.7–10.5)
CHLORIDE SERPL-SCNC: 101 MMOL/L (ref 95–110)
CO2 SERPL-SCNC: 25 MMOL/L (ref 23–29)
CODING SYSTEM: NORMAL
CREAT SERPL-MCNC: 1.2 MG/DL (ref 0.5–1.4)
CROSSMATCH INTERPRETATION: NORMAL
DIFFERENTIAL METHOD: ABNORMAL
DIFFERENTIAL METHOD: ABNORMAL
DISPENSE STATUS: NORMAL
EOSINOPHIL # BLD AUTO: 0.4 K/UL (ref 0–0.5)
EOSINOPHIL # BLD AUTO: 0.4 K/UL (ref 0–0.5)
EOSINOPHIL NFR BLD: 3.3 % (ref 0–8)
EOSINOPHIL NFR BLD: 4 % (ref 0–8)
ERYTHROCYTE [DISTWIDTH] IN BLOOD BY AUTOMATED COUNT: 15.7 % (ref 11.5–14.5)
ERYTHROCYTE [DISTWIDTH] IN BLOOD BY AUTOMATED COUNT: 15.8 % (ref 11.5–14.5)
EST. GFR  (NO RACE VARIABLE): 45.2 ML/MIN/1.73 M^2
GLUCOSE SERPL-MCNC: 185 MG/DL (ref 70–110)
HCT VFR BLD AUTO: 23.4 % (ref 37–48.5)
HCT VFR BLD AUTO: 25.5 % (ref 37–48.5)
HGB BLD-MCNC: 7.2 G/DL (ref 12–16)
HGB BLD-MCNC: 7.5 G/DL (ref 12–16)
IMM GRANULOCYTES # BLD AUTO: 0.03 K/UL (ref 0–0.04)
IMM GRANULOCYTES # BLD AUTO: 0.09 K/UL (ref 0–0.04)
IMM GRANULOCYTES NFR BLD AUTO: 0.3 % (ref 0–0.5)
IMM GRANULOCYTES NFR BLD AUTO: 0.7 % (ref 0–0.5)
INR PPP: 1.2 (ref 0.8–1.2)
LYMPHOCYTES # BLD AUTO: 1.2 K/UL (ref 1–4.8)
LYMPHOCYTES # BLD AUTO: 1.5 K/UL (ref 1–4.8)
LYMPHOCYTES NFR BLD: 16.2 % (ref 18–48)
LYMPHOCYTES NFR BLD: 9.4 % (ref 18–48)
MCH RBC QN AUTO: 25.7 PG (ref 27–31)
MCH RBC QN AUTO: 26 PG (ref 27–31)
MCHC RBC AUTO-ENTMCNC: 29.4 G/DL (ref 32–36)
MCHC RBC AUTO-ENTMCNC: 30.8 G/DL (ref 32–36)
MCV RBC AUTO: 85 FL (ref 82–98)
MCV RBC AUTO: 87 FL (ref 82–98)
MONOCYTES # BLD AUTO: 0.5 K/UL (ref 0.3–1)
MONOCYTES # BLD AUTO: 0.6 K/UL (ref 0.3–1)
MONOCYTES NFR BLD: 4.6 % (ref 4–15)
MONOCYTES NFR BLD: 5.8 % (ref 4–15)
NEUTROPHILS # BLD AUTO: 10.7 K/UL (ref 1.8–7.7)
NEUTROPHILS # BLD AUTO: 6.8 K/UL (ref 1.8–7.7)
NEUTROPHILS NFR BLD: 73.6 % (ref 38–73)
NEUTROPHILS NFR BLD: 81.8 % (ref 38–73)
NRBC BLD-RTO: 0 /100 WBC
NRBC BLD-RTO: 1 /100 WBC
NUM UNITS TRANS PACKED RBC: NORMAL
PLATELET # BLD AUTO: 253 K/UL (ref 150–450)
PLATELET # BLD AUTO: 296 K/UL (ref 150–450)
PMV BLD AUTO: 10.6 FL (ref 9.2–12.9)
PMV BLD AUTO: 10.9 FL (ref 9.2–12.9)
POCT GLUCOSE: 177 MG/DL (ref 70–110)
POCT GLUCOSE: 225 MG/DL (ref 70–110)
POCT GLUCOSE: 225 MG/DL (ref 70–110)
POCT GLUCOSE: 288 MG/DL (ref 70–110)
POTASSIUM SERPL-SCNC: 3.7 MMOL/L (ref 3.5–5.1)
PROT SERPL-MCNC: 6.6 G/DL (ref 6–8.4)
PROTHROMBIN TIME: 12.1 SEC (ref 9–12.5)
RBC # BLD AUTO: 2.77 M/UL (ref 4–5.4)
RBC # BLD AUTO: 2.92 M/UL (ref 4–5.4)
SODIUM SERPL-SCNC: 135 MMOL/L (ref 136–145)
SPECIMEN OUTDATE: ABNORMAL
WBC # BLD AUTO: 13.11 K/UL (ref 3.9–12.7)
WBC # BLD AUTO: 9.2 K/UL (ref 3.9–12.7)

## 2023-04-04 PROCEDURE — 85730 THROMBOPLASTIN TIME PARTIAL: CPT | Mod: 91 | Performed by: STUDENT IN AN ORGANIZED HEALTH CARE EDUCATION/TRAINING PROGRAM

## 2023-04-04 PROCEDURE — D9220A PRA ANESTHESIA: Mod: ANES,,, | Performed by: ANESTHESIOLOGY

## 2023-04-04 PROCEDURE — 37224 PR FEM/POPL REVAS W/TLA: CPT | Mod: LT,,, | Performed by: SURGERY

## 2023-04-04 PROCEDURE — 25000003 PHARM REV CODE 250: Performed by: STUDENT IN AN ORGANIZED HEALTH CARE EDUCATION/TRAINING PROGRAM

## 2023-04-04 PROCEDURE — 86922 COMPATIBILITY TEST ANTIGLOB: CPT

## 2023-04-04 PROCEDURE — D9220A PRA ANESTHESIA: ICD-10-PCS | Mod: CRNA,,, | Performed by: NURSE ANESTHETIST, CERTIFIED REGISTERED

## 2023-04-04 PROCEDURE — P9016 RBC LEUKOCYTES REDUCED: HCPCS

## 2023-04-04 PROCEDURE — D9220A PRA ANESTHESIA: ICD-10-PCS | Mod: ANES,,, | Performed by: ANESTHESIOLOGY

## 2023-04-04 PROCEDURE — 36410 VNPNXR 3YR/> PHY/QHP DX/THER: CPT

## 2023-04-04 PROCEDURE — 99024 PR POST-OP FOLLOW-UP VISIT: ICD-10-PCS | Mod: ,,, | Performed by: SURGERY

## 2023-04-04 PROCEDURE — 71000033 HC RECOVERY, INTIAL HOUR: Performed by: SURGERY

## 2023-04-04 PROCEDURE — 71000016 HC POSTOP RECOV ADDL HR: Performed by: SURGERY

## 2023-04-04 PROCEDURE — 25500020 PHARM REV CODE 255: Performed by: SURGERY

## 2023-04-04 PROCEDURE — 85610 PROTHROMBIN TIME: CPT | Performed by: STUDENT IN AN ORGANIZED HEALTH CARE EDUCATION/TRAINING PROGRAM

## 2023-04-04 PROCEDURE — 75710 PR  ANGIO EXTREMITY UNILAT: ICD-10-PCS | Mod: 26,59,, | Performed by: SURGERY

## 2023-04-04 PROCEDURE — C1887 CATHETER, GUIDING: HCPCS | Performed by: SURGERY

## 2023-04-04 PROCEDURE — 36415 COLL VENOUS BLD VENIPUNCTURE: CPT

## 2023-04-04 PROCEDURE — 63600175 PHARM REV CODE 636 W HCPCS: Performed by: STUDENT IN AN ORGANIZED HEALTH CARE EDUCATION/TRAINING PROGRAM

## 2023-04-04 PROCEDURE — 82962 GLUCOSE BLOOD TEST: CPT | Performed by: SURGERY

## 2023-04-04 PROCEDURE — 63600175 PHARM REV CODE 636 W HCPCS: Performed by: SURGERY

## 2023-04-04 PROCEDURE — 71000015 HC POSTOP RECOV 1ST HR: Performed by: SURGERY

## 2023-04-04 PROCEDURE — 36415 COLL VENOUS BLD VENIPUNCTURE: CPT | Performed by: SURGERY

## 2023-04-04 PROCEDURE — D9220A PRA ANESTHESIA: Mod: CRNA,,, | Performed by: NURSE ANESTHETIST, CERTIFIED REGISTERED

## 2023-04-04 PROCEDURE — 37000008 HC ANESTHESIA 1ST 15 MINUTES: Performed by: SURGERY

## 2023-04-04 PROCEDURE — 25000003 PHARM REV CODE 250

## 2023-04-04 PROCEDURE — 37224 PR FEM/POPL REVAS W/TLA: ICD-10-PCS | Mod: LT,,, | Performed by: SURGERY

## 2023-04-04 PROCEDURE — 76937 US GUIDE VASCULAR ACCESS: CPT

## 2023-04-04 PROCEDURE — 36000707: Performed by: SURGERY

## 2023-04-04 PROCEDURE — 99233 SBSQ HOSP IP/OBS HIGH 50: CPT | Mod: ,,, | Performed by: STUDENT IN AN ORGANIZED HEALTH CARE EDUCATION/TRAINING PROGRAM

## 2023-04-04 PROCEDURE — 25000003 PHARM REV CODE 250: Performed by: SURGERY

## 2023-04-04 PROCEDURE — 86900 BLOOD TYPING SEROLOGIC ABO: CPT

## 2023-04-04 PROCEDURE — C1769 GUIDE WIRE: HCPCS | Performed by: SURGERY

## 2023-04-04 PROCEDURE — 25000003 PHARM REV CODE 250: Performed by: NURSE ANESTHETIST, CERTIFIED REGISTERED

## 2023-04-04 PROCEDURE — 99233 PR SUBSEQUENT HOSPITAL CARE,LEVL III: ICD-10-PCS | Mod: ,,, | Performed by: STUDENT IN AN ORGANIZED HEALTH CARE EDUCATION/TRAINING PROGRAM

## 2023-04-04 PROCEDURE — 36000706: Performed by: SURGERY

## 2023-04-04 PROCEDURE — 85730 THROMBOPLASTIN TIME PARTIAL: CPT | Mod: 91 | Performed by: SURGERY

## 2023-04-04 PROCEDURE — 94761 N-INVAS EAR/PLS OXIMETRY MLT: CPT

## 2023-04-04 PROCEDURE — 37000009 HC ANESTHESIA EA ADD 15 MINS: Performed by: SURGERY

## 2023-04-04 PROCEDURE — 75710 ARTERY X-RAYS ARM/LEG: CPT | Mod: 26,59,, | Performed by: SURGERY

## 2023-04-04 PROCEDURE — 85025 COMPLETE CBC W/AUTO DIFF WBC: CPT | Mod: 91 | Performed by: STUDENT IN AN ORGANIZED HEALTH CARE EDUCATION/TRAINING PROGRAM

## 2023-04-04 PROCEDURE — 80053 COMPREHEN METABOLIC PANEL: CPT

## 2023-04-04 PROCEDURE — 36415 COLL VENOUS BLD VENIPUNCTURE: CPT | Performed by: STUDENT IN AN ORGANIZED HEALTH CARE EDUCATION/TRAINING PROGRAM

## 2023-04-04 PROCEDURE — 99024 POSTOP FOLLOW-UP VISIT: CPT | Mod: ,,, | Performed by: SURGERY

## 2023-04-04 PROCEDURE — 20600001 HC STEP DOWN PRIVATE ROOM

## 2023-04-04 PROCEDURE — 85025 COMPLETE CBC W/AUTO DIFF WBC: CPT

## 2023-04-04 PROCEDURE — 86902 BLOOD TYPE ANTIGEN DONOR EA: CPT

## 2023-04-04 PROCEDURE — 63600175 PHARM REV CODE 636 W HCPCS

## 2023-04-04 PROCEDURE — C1751 CATH, INF, PER/CENT/MIDLINE: HCPCS

## 2023-04-04 PROCEDURE — C1725 CATH, TRANSLUMIN NON-LASER: HCPCS | Performed by: SURGERY

## 2023-04-04 PROCEDURE — 27201423 OPTIME MED/SURG SUP & DEVICES STERILE SUPPLY: Performed by: SURGERY

## 2023-04-04 PROCEDURE — 85730 THROMBOPLASTIN TIME PARTIAL: CPT | Performed by: STUDENT IN AN ORGANIZED HEALTH CARE EDUCATION/TRAINING PROGRAM

## 2023-04-04 PROCEDURE — 63600175 PHARM REV CODE 636 W HCPCS: Performed by: NURSE ANESTHETIST, CERTIFIED REGISTERED

## 2023-04-04 PROCEDURE — C1894 INTRO/SHEATH, NON-LASER: HCPCS | Performed by: SURGERY

## 2023-04-04 PROCEDURE — 36430 TRANSFUSION BLD/BLD COMPNT: CPT

## 2023-04-04 RX ORDER — PROTAMINE SULFATE 10 MG/ML
INJECTION, SOLUTION INTRAVENOUS
Status: DISCONTINUED | OUTPATIENT
Start: 2023-04-04 | End: 2023-04-04

## 2023-04-04 RX ORDER — PROPOFOL 10 MG/ML
VIAL (ML) INTRAVENOUS CONTINUOUS PRN
Status: DISCONTINUED | OUTPATIENT
Start: 2023-04-04 | End: 2023-04-04

## 2023-04-04 RX ORDER — FENTANYL CITRATE 50 UG/ML
25 INJECTION, SOLUTION INTRAMUSCULAR; INTRAVENOUS EVERY 5 MIN PRN
Status: DISCONTINUED | OUTPATIENT
Start: 2023-04-04 | End: 2023-04-04

## 2023-04-04 RX ORDER — DEXMEDETOMIDINE HYDROCHLORIDE 100 UG/ML
INJECTION, SOLUTION INTRAVENOUS
Status: DISCONTINUED | OUTPATIENT
Start: 2023-04-04 | End: 2023-04-04

## 2023-04-04 RX ORDER — LIDOCAINE HYDROCHLORIDE 10 MG/ML
INJECTION, SOLUTION EPIDURAL; INFILTRATION; INTRACAUDAL; PERINEURAL
Status: DISCONTINUED | OUTPATIENT
Start: 2023-04-04 | End: 2023-04-04 | Stop reason: HOSPADM

## 2023-04-04 RX ORDER — HEPARIN SODIUM 1000 [USP'U]/ML
INJECTION, SOLUTION INTRAVENOUS; SUBCUTANEOUS
Status: DISCONTINUED | OUTPATIENT
Start: 2023-04-04 | End: 2023-04-04 | Stop reason: HOSPADM

## 2023-04-04 RX ORDER — VASOPRESSIN 20 [USP'U]/ML
INJECTION, SOLUTION INTRAMUSCULAR; SUBCUTANEOUS
Status: DISCONTINUED | OUTPATIENT
Start: 2023-04-04 | End: 2023-04-04

## 2023-04-04 RX ORDER — SODIUM CHLORIDE 0.9 % (FLUSH) 0.9 %
10 SYRINGE (ML) INJECTION
Status: DISCONTINUED | OUTPATIENT
Start: 2023-04-04 | End: 2023-04-04

## 2023-04-04 RX ORDER — IODIXANOL 320 MG/ML
INJECTION, SOLUTION INTRAVASCULAR
Status: DISCONTINUED | OUTPATIENT
Start: 2023-04-04 | End: 2023-04-04 | Stop reason: HOSPADM

## 2023-04-04 RX ORDER — SODIUM CHLORIDE, SODIUM LACTATE, POTASSIUM CHLORIDE, CALCIUM CHLORIDE 600; 310; 30; 20 MG/100ML; MG/100ML; MG/100ML; MG/100ML
INJECTION, SOLUTION INTRAVENOUS CONTINUOUS
Status: DISCONTINUED | OUTPATIENT
Start: 2023-04-04 | End: 2023-04-05

## 2023-04-04 RX ORDER — HEPARIN SODIUM 1000 [USP'U]/ML
INJECTION, SOLUTION INTRAVENOUS; SUBCUTANEOUS
Status: DISCONTINUED | OUTPATIENT
Start: 2023-04-04 | End: 2023-04-04

## 2023-04-04 RX ORDER — HEPARIN SODIUM,PORCINE/D5W 25000/250
0-40 INTRAVENOUS SOLUTION INTRAVENOUS CONTINUOUS
Status: DISCONTINUED | OUTPATIENT
Start: 2023-04-04 | End: 2023-04-05

## 2023-04-04 RX ORDER — HYDROCODONE BITARTRATE AND ACETAMINOPHEN 500; 5 MG/1; MG/1
TABLET ORAL
Status: DISCONTINUED | OUTPATIENT
Start: 2023-04-04 | End: 2023-04-06

## 2023-04-04 RX ORDER — KETAMINE HCL IN 0.9 % NACL 50 MG/5 ML
SYRINGE (ML) INTRAVENOUS
Status: DISCONTINUED | OUTPATIENT
Start: 2023-04-04 | End: 2023-04-04

## 2023-04-04 RX ORDER — LABETALOL HYDROCHLORIDE 5 MG/ML
INJECTION, SOLUTION INTRAVENOUS
Status: DISCONTINUED | OUTPATIENT
Start: 2023-04-04 | End: 2023-04-04

## 2023-04-04 RX ORDER — PHENYLEPHRINE HYDROCHLORIDE 10 MG/ML
INJECTION INTRAVENOUS
Status: DISCONTINUED | OUTPATIENT
Start: 2023-04-04 | End: 2023-04-04

## 2023-04-04 RX ORDER — HALOPERIDOL 5 MG/ML
0.5 INJECTION INTRAMUSCULAR EVERY 10 MIN PRN
Status: DISCONTINUED | OUTPATIENT
Start: 2023-04-04 | End: 2023-04-04

## 2023-04-04 RX ADMIN — ATORVASTATIN CALCIUM 40 MG: 40 TABLET, FILM COATED ORAL at 08:04

## 2023-04-04 RX ADMIN — PROTAMINE SULFATE 10 MG: 10 INJECTION, SOLUTION INTRAVENOUS at 10:04

## 2023-04-04 RX ADMIN — SODIUM CHLORIDE: 9 INJECTION, SOLUTION INTRAVENOUS at 08:04

## 2023-04-04 RX ADMIN — VASOPRESSIN 2 UNITS: 20 INJECTION INTRAVENOUS at 09:04

## 2023-04-04 RX ADMIN — SODIUM CHLORIDE 0.4 MCG/KG/MIN: 9 INJECTION, SOLUTION INTRAVENOUS at 10:04

## 2023-04-04 RX ADMIN — HEPARIN SODIUM AND DEXTROSE 12 UNITS/KG/HR: 10000; 5 INJECTION INTRAVENOUS at 05:04

## 2023-04-04 RX ADMIN — ACETAMINOPHEN 650 MG: 325 TABLET ORAL at 05:04

## 2023-04-04 RX ADMIN — ACETAMINOPHEN 650 MG: 325 TABLET ORAL at 06:04

## 2023-04-04 RX ADMIN — Medication 20 MG: at 09:04

## 2023-04-04 RX ADMIN — CARVEDILOL 12.5 MG: 12.5 TABLET, FILM COATED ORAL at 08:04

## 2023-04-04 RX ADMIN — PROTAMINE SULFATE 20 MG: 10 INJECTION, SOLUTION INTRAVENOUS at 10:04

## 2023-04-04 RX ADMIN — MICAFUNGIN SODIUM 100 MG: 100 INJECTION, POWDER, LYOPHILIZED, FOR SOLUTION INTRAVENOUS at 08:04

## 2023-04-04 RX ADMIN — Medication 15 MG: at 09:04

## 2023-04-04 RX ADMIN — CEFEPIME 2 G: 2 INJECTION, POWDER, FOR SOLUTION INTRAVENOUS at 02:04

## 2023-04-04 RX ADMIN — DULOXETINE 30 MG: 30 CAPSULE, DELAYED RELEASE ORAL at 08:04

## 2023-04-04 RX ADMIN — HEPARIN SODIUM 5000 UNITS: 1000 INJECTION, SOLUTION INTRAVENOUS; SUBCUTANEOUS at 09:04

## 2023-04-04 RX ADMIN — INSULIN ASPART 3 UNITS: 100 INJECTION, SOLUTION INTRAVENOUS; SUBCUTANEOUS at 12:04

## 2023-04-04 RX ADMIN — Medication 15 MG: at 10:04

## 2023-04-04 RX ADMIN — VASOPRESSIN 1 UNITS: 20 INJECTION INTRAVENOUS at 09:04

## 2023-04-04 RX ADMIN — LABETALOL HYDROCHLORIDE 5 MG: 5 INJECTION, SOLUTION INTRAVENOUS at 10:04

## 2023-04-04 RX ADMIN — GLYCOPYRROLATE 0.2 MG: 0.2 INJECTION, SOLUTION INTRAMUSCULAR; INTRAVENOUS at 09:04

## 2023-04-04 RX ADMIN — SODIUM CHLORIDE, POTASSIUM CHLORIDE, SODIUM LACTATE AND CALCIUM CHLORIDE: 600; 310; 30; 20 INJECTION, SOLUTION INTRAVENOUS at 07:04

## 2023-04-04 RX ADMIN — PHENYLEPHRINE HYDROCHLORIDE 200 MCG: 10 INJECTION INTRAVENOUS at 09:04

## 2023-04-04 RX ADMIN — OXYCODONE HYDROCHLORIDE 5 MG: 5 TABLET ORAL at 08:04

## 2023-04-04 RX ADMIN — PROPOFOL 100 MCG/KG/MIN: 10 INJECTION, EMULSION INTRAVENOUS at 09:04

## 2023-04-04 RX ADMIN — PANTOPRAZOLE SODIUM 40 MG: 40 TABLET, DELAYED RELEASE ORAL at 08:04

## 2023-04-04 RX ADMIN — PHENYLEPHRINE HYDROCHLORIDE 100 MCG: 10 INJECTION INTRAVENOUS at 09:04

## 2023-04-04 RX ADMIN — DEXMEDETOMIDINE HYDROCHLORIDE 16 MCG: 100 INJECTION, SOLUTION INTRAVENOUS at 09:04

## 2023-04-04 RX ADMIN — INSULIN ASPART 6 UNITS: 100 INJECTION, SOLUTION INTRAVENOUS; SUBCUTANEOUS at 11:04

## 2023-04-04 RX ADMIN — DEXMEDETOMIDINE HYDROCHLORIDE 8 MCG: 100 INJECTION, SOLUTION INTRAVENOUS at 10:04

## 2023-04-04 RX ADMIN — INSULIN ASPART 4 UNITS: 100 INJECTION, SOLUTION INTRAVENOUS; SUBCUTANEOUS at 06:04

## 2023-04-04 RX ADMIN — CEFEPIME 2 G: 2 INJECTION, POWDER, FOR SOLUTION INTRAVENOUS at 05:04

## 2023-04-04 NOTE — ANESTHESIA POSTPROCEDURE EVALUATION
Anesthesia Post Evaluation    Patient: Nelsy Marino    Procedure(s) Performed: Procedure(s) (LRB):  Angiogram Extremity Unilateral - possible endovascular intervention (Right)    Final Anesthesia Type: general (Natural airway)      Patient location during evaluation: PACU  Patient participation: Yes- Able to Participate  Level of consciousness: awake and alert  Post-procedure vital signs: reviewed and stable  Pain management: adequate  Airway patency: patent    PONV status at discharge: No PONV  Anesthetic complications: no      Cardiovascular status: hemodynamically stable  Respiratory status: unassisted  Hydration status: euvolemic  Follow-up not needed.          Vitals Value Taken Time   /63 04/04/23 1434   Temp 36.6 °C (97.8 °F) 04/04/23 1434   Pulse 70 04/04/23 1434   Resp 18 04/04/23 1434   SpO2 100 % 04/04/23 1434         Event Time   Out of Recovery 04/04/2023 11:30:00         Pain/Ana Maria Score: Pain Rating Prior to Med Admin: 5 (4/4/2023  6:13 AM)  Pain Rating Post Med Admin: 4 (4/4/2023  7:13 AM)  Ana Maria Score: 9 (4/4/2023 11:30 AM)

## 2023-04-04 NOTE — PROGRESS NOTES
[facility]  Infectious Disease  Progress Note    Patient Name: Nelsy Marino  MRN: [id number]  Admission Date: 3/29/2023  Length of Stay: 6 days  Attending Physician: Danny Dunbar MD  Primary Care Provider: [facility]    Isolation Status: No active isolations  Assessment/Plan:      Orthopedic  * Wound dehiscence  Patient with history of left to right fem to SFA bypass and right SFA angioplasty on 03/2/23; 8mm life-stent in place. She has developed dehiscence of her left groin decision.  CTA showed soft tissue stranding and trace fluid around the left femoral artery. No organized fluid collection. S/P OR 3/31 with vascular surgery for debridement of wound.   OR L groin wound cultures +C glabrata, and +C albicans; underlying graft exposed.   -- Micro working up sensitivities for C.glabrata.    Vasc surg: pt now s/p (04/04) endovasc of short segment occlusion L CFA.  Plastics: plans for skin graft pending eval (04/07).    Recommendations / Plan:  · Continue IV-micafungin 100mg Q24 (cxs +C.glabrata; +C.albicans)  · Continue IV-Cefepime 2 g Q12h (renally dosed CrCl 34)  -- [Note: recent h/o R foot TMA wound dehiscence cx+ PSA (03/10/23), s/p cipro course for SSTI].   -- Podiatry following for TMA wound management.   · For tx of infection of native graft (w/ stent in place), recommend 6wk duration s/p last vasc surgery (04/04); [GABE 05/16/23].    -- ID will schedule pt for f/u appt in ID clinic in 2-3wks    -- Discussed with ID staff and primary team   -- ID will sign-off; see OPAT note below for outpt IV abx plans.      Outpatient Antibiotic Therapy Plan:    Please send referral to [facility].    1) Infection :   Wound Infection with exposed underlying native graft (w/ stent in place)  [wound cx +C.glabrata, +C.albicans]    2) Discharge Antibiotics:     Intravenous antibiotics:   IV - Cefepime 2 g Q12h  [renally dosed CrCl 34 mL/min].   IV - Micafungin 100mg  Q24h      3) Therapy Duration:    6wks s/p (04/04)    Estimated end date of IV antibiotics:    05/16/23    4) Outpatient Weekly Labs:    Order the following labs to be drawn on Mondays:    CBC   CMP    CRP     CPK (when on Daptomycin)   Vancomycin trough. Target 15-20    If discharged on vancomycin IV, order the following additional labs to be drawn on Thursdays:   CMP    Vancomycin trough. Target 15-20    If vancomycin trough is not at target (15-20) prior to discharge, schedule vancomycin trough to be drawn before their fourth outpatient dose.    5) Fax Lab Results to Infectious Diseases Provider:  Demarco Victoria PA-C    [facility] Fax Number: [phone]    6) Outpatient Infectious Diseases Follow-up     Follow-up appointment will be arranged by the ID clinic and will be found in the patient's appointments tab.     Prior to discharge, please ensure the patient's follow-up appt has been scheduled with ID-Clinic -- for patient convenience and continuity of care.   If there is still no follow-up scheduled prior to discharge, please send an EPIC message to Yue Arteaga LPN in Infectious Diseases.                Thank you for your consult. I will sign off. Please contact us if you have any additional questions.    Demarco Victoria PA-C  Infectious Disease  [facility]    Subjective:     Principal Problem:Wound dehiscence    HPI: 82 year old female with history of right iliofemoral bypass 3/2019 c/b wound dehiscence requiring washout 4/2019 and right groin infection following a vascular bypass surgery on lifelong cephalexin suppression (indication: vascular graft retained + proximity of abscess to graft).     Patient was admitted earlier this month with right foot gangrene. She underwent left external iliac to R SFA bypass with PTFE, left common + ext iliac artery stenting, R SFA angioplasty with drug coated balloon on 3/02. She then underwent right TMA 3/04 (surgical cx of clean bone margin  neg, path neg). During her stay she did develop wound dehiscence at TMA site. Wound cultures grew pseudomonas fluorescens and she completed a course of ciprofloxacin with plan was to resume cephalexin for chronic suppression of prior graft infection after completing ciprofloxacin.    Patient was discharged home on 3/20. While at home, her  noticed her left groin wound had dehisced. Denies purulent drainage. Per patient, she is mainly bed bound. Denies fevers, chills, sweats. Denies foul odor, diarrhea, urinary contamination. Her TMA site is necrotic. Non tender. Patient followed up with vascular surgery yesterday who recommended admission.      CTA showed redemonstration of complete occlusion of the old femoral-femoral bypass. There is new lower abdominal wall postoperative changes of femoral bypass graft creation with appropriate opacification through the graft with no evidence of occlusion. There is extensive soft tissue stranding and trace of fluid around the left femoral artery. No organized fluid collection.     Primary team started Vanc/Zosyn. ID consulted for antibiotic recommendations.          Interval History: Pt remains afebrile; leukocytosis 13 (up from 9). Pt continuing on cefepime, and micafungin (+c. Glabrata, +C albicans)  --Micro working up sensitivities of C. Glabrata.   Pt taken to OR today for endovasc procedure -- chronic disease in distal CFA. Mid to distal SFA also treated with good result. Reestablished good flow through CFA three vessel runoff to foot .    Review of Systems   Constitutional:  Negative for chills, diaphoresis, fever and unexpected weight change.   HENT:  Negative for dental problem, mouth sores and trouble swallowing.    Eyes:  Negative for pain and visual disturbance.   Respiratory:  Negative for cough and shortness of breath.    Cardiovascular:  Negative for chest pain and leg swelling.   Gastrointestinal:  Negative for abdominal distention, abdominal pain, blood in  stool, diarrhea, nausea and vomiting.   Genitourinary:  Negative for decreased urine volume, difficulty urinating, dysuria, flank pain, frequency, hematuria and urgency.   Musculoskeletal:  Positive for myalgias (Appropriate post-op pain, stable, controlled.). Negative for arthralgias, back pain, joint swelling and neck pain.   Skin:  Negative for color change, rash and wound.   Neurological:  Negative for seizures, weakness and headaches.   Psychiatric/Behavioral:  Negative for confusion.    Objective:     Vital Signs (Most Recent):  Temp: 97.5 °F (36.4 °C) (04/04/23 1615)  Pulse: 80 (04/04/23 1615)  Resp: 18 (04/04/23 1615)  BP: (!) 148/65 (04/04/23 1615)  SpO2: 95 % (04/04/23 1615) Vital Signs (24h Range):  Temp:  [97.4 °F (36.3 °C)-98.5 °F (36.9 °C)] 97.5 °F (36.4 °C)  Pulse:  [66-80] 80  Resp:  [16-22] 18  SpO2:  [84 %-100 %] 95 %  BP: (101-148)/(50-67) 148/65     Weight: 67 kg (147 lb 11.3 oz)  Body mass index is 25.35 kg/m².    Estimated Creatinine Clearance: 34 mL/min (based on SCr of 1.2 mg/dL).    Physical Exam  Constitutional:       General: She is not in acute distress.     Appearance: Normal appearance. She is not ill-appearing, toxic-appearing or diaphoretic.   HENT:      Head: Normocephalic and atraumatic.      Mouth/Throat:      Mouth: Mucous membranes are moist.      Pharynx: Oropharynx is clear.   Eyes:      General: No scleral icterus.     Conjunctiva/sclera: Conjunctivae normal.   Cardiovascular:      Rate and Rhythm: Normal rate.      Heart sounds: Normal heart sounds.   Pulmonary:      Effort: Pulmonary effort is normal.      Breath sounds: Normal breath sounds.   Abdominal:      General: Bowel sounds are normal. There is no distension.      Palpations: Abdomen is soft.      Tenderness: There is no abdominal tenderness. There is no right CVA tenderness or left CVA tenderness.   Musculoskeletal:         General: Tenderness (appropriate post-op TTP; surgical drain in place, draining bloody/SS  fluid.) present. No swelling. Normal range of motion.      Cervical back: Normal range of motion. No rigidity or tenderness.      Right lower leg: No edema.      Left lower leg: No edema.   Lymphadenopathy:      Cervical: No cervical adenopathy.   Skin:     General: Skin is warm and dry.      Findings: No rash.   Neurological:      Mental Status: She is alert and oriented to person, place, and time. Mental status is at baseline.   Psychiatric:         Behavior: Behavior normal.         Thought Content: Thought content normal.       Significant Labs: Blood Culture:   Recent Labs   Lab 02/27/23  1923 03/08/23  2325 03/10/23  0942 03/10/23  0943 03/29/23 2009   LABBLOO No Growth after 4 days. No growth after 5 days.  No growth after 5 days. No growth after 5 days. No growth after 5 days. No growth after 5 days.  No growth after 5 days.       CBC:   Recent Labs   Lab 04/03/23  0230 04/04/23  0335 04/04/23  1215   WBC 9.70 9.20 13.11*   HGB 8.1* 7.2* 7.5*   HCT 26.1* 23.4* 25.5*    296 253       CMP:   Recent Labs   Lab 04/03/23  0230 04/04/23  0335    135*   K 3.9 3.7    101   CO2 25 25   * 185*   BUN 34* 41*   CREATININE 1.2 1.2   CALCIUM 8.8 8.7   PROT 7.0 6.6   ALBUMIN 2.2* 2.0*   BILITOT 0.7 0.6   ALKPHOS 57 61   AST 16 19   ALT 7* 8*   ANIONGAP 11 9       Wound Culture:   Recent Labs   Lab 03/04/23  1141 03/10/23  1340 03/31/23  0805 03/31/23  0809   LABAERO No growth PSEUDOMONAS FLUORESCENS GROUP  Few  * FELI ALBICANS  Rare  * FELI GLABRATA  Rare  Susceptibility pending  *       All pertinent labs within the past 24 hours have been reviewed.    Significant Imaging: I have reviewed all pertinent imaging results/findings within the past 24 hours.    I personally reviewed records today as well as relevant labs, cultures, and imaging. This includes old hospital and clinic records.     Will continue to monitor for antimicrobial toxicities/No toxicities from antimicrobials    Patient  is high risk for infectious complications given advanced age, multiple co morbidities, and case complexity.    Time: 50 minutes   50% of time spent on face-to-face counseling and coordination of care. Counseling included review of test results, diagnosis, and treatment plan with patient and/or family.

## 2023-04-04 NOTE — NURSING
Paged Bettina Recinos on call team to acknowledge them of pt's IV access and only receiving 1/2 bad of unit of blood, heparin drip restart and fluids/antx. Awaiting call back.

## 2023-04-04 NOTE — PROGRESS NOTES
Plastic and Reconstructive Surgery   Progress Note    Subjective:      Patient seen and examined at bedside. No acute event overnight. Patient without complaint this AM. Patient heading to OR with vascular for angiogram.    Objective:  Vital signs in last 24 hours:  Temp:  [97.3 °F (36.3 °C)-98.7 °F (37.1 °C)] 98.5 °F (36.9 °C)  Pulse:  [72-81] 75  Resp:  [18-20] 18  SpO2:  [93 %-95 %] 94 %  BP: (109-138)/(56-60) 135/60    Intake/Output last 3 shifts:  I/O last 3 completed shifts:  In: -   Out: 665 [Urine:500; Drains:10; Other:155]    Intake/Output this shift:  No intake/output data recorded.        Physical Exam:  VITAL SIGNS:   Vitals:    23 1923 23 2200 23 0418 23 0708   BP: (!) 138/56  138/60 135/60   BP Location: Left arm  Right arm Left arm   Patient Position: Sitting  Lying Lying   Pulse: 80  75 75   Resp: 20 18 18 18   Temp: 98.2 °F (36.8 °C)  98.3 °F (36.8 °C) 98.5 °F (36.9 °C)   TempSrc: Oral  Oral Oral   SpO2: (!) 93%  (!) 93% (!) 94%   Weight:       Height:         TMAX: Temp (24hrs), Av.2 °F (36.8 °C), Min:97.3 °F (36.3 °C), Max:98.7 °F (37.1 °C)    General: Alert; No acute distress  Cardiovascular: Regular rate   Respiratory: Normal respiratory effort. Chest rise symmetric.   Abdomen: Soft, nontender, nondistended  Extremity: left groin/thigh with wound vac in place  Neurologic: No focal deficit. Speech normal      Scheduled Medications acetaminophen, 650 mg, Q6H  amLODIPine, 10 mg, Daily  ammonium lactate, , Daily  aspirin, 81 mg, Daily  atorvastatin, 40 mg, QHS  carvediloL, 12.5 mg, BID  ceFEPime (MAXIPIME) IVPB, 2 g, Q12H  clopidogreL, 75 mg, Daily  DULoxetine, 30 mg, BID  furosemide, 20 mg, Daily  micafungin (MYCAMINE) IVPB, 100 mg, Q24H  pantoprazole, 40 mg, BID      PRN Medications sodium chloride, sodium chloride, dextrose 10%, glucagon (human recombinant), heparin (PORCINE), heparin (PORCINE), insulin aspart U-100, ondansetron, oxyCODONE, sodium chloride  0.9%    Recent Labs:   Lab Results   Component Value Date    WBC 9.20 04/04/2023    HGB 7.2 (L) 04/04/2023    HCT 23.4 (L) 04/04/2023    MCV 85 04/04/2023     04/04/2023     Lab Results   Component Value Date     (H) 04/04/2023     (L) 04/04/2023    K 3.7 04/04/2023     04/04/2023    BUN 41 (H) 04/04/2023         Assessment: 82 y.o. y/o female 4 Days Post-Op s/p Procedure(s):  DEBRIDEMENT, WOUND  CREATION, FLAP, MUSCLE ROTATION Doing well postoperatively.    Plan  Diabetic diet  IV abx  Pain control  ASA/plavix/hep drip  Wound vac to stay on at all times for 7 days, will remove 4/7  F/u vascular recs  Will plan for skin graft pending graft eval 4/7  Medical management as per primary  Discussed with Dr. Sherice Ireland DO- Fellow  Department of Plastic and Reconstructive Surgery  [phone] (office)

## 2023-04-04 NOTE — TRANSFER OF CARE
"Anesthesia Transfer of Care Note    Patient: Nelsy Marino    Procedure(s) Performed: Procedure(s) (LRB):  Angiogram Extremity Unilateral - possible endovascular intervention (Right)    Patient location: PACU    Anesthesia Type: general    Transport from OR: Transported from OR on 6-10 L/min O2 by face mask with adequate spontaneous ventilation    Post pain: adequate analgesia    Post assessment: no apparent anesthetic complications and tolerated procedure well    Post vital signs: stable    Level of consciousness: sedated    Complications: none    Transfer of care protocol was followed      Last vitals:   Visit Vitals  /62 (BP Location: Right arm, Patient Position: Lying)   Pulse 75   Temp 36.9 °C (98.5 °F) (Oral)   Resp 18   Ht 5' 4" (1.626 m)   Wt 67 kg (147 lb 11.3 oz)   LMP  (LMP Unknown)   SpO2 97%   Breastfeeding No   BMI 25.35 kg/m²     "

## 2023-04-04 NOTE — PROGRESS NOTES
[facility]  Infectious Disease  Progress Note    Patient Name: Nelsy Marino  MRN: [id number]  Admission Date: 3/29/2023  Length of Stay: 5 days  Attending Physician: Danny Dunbar MD  Primary Care Provider: [facility]    Isolation Status: No active isolations  Assessment/Plan:      Orthopedic  * Wound dehiscence  Patient with history of left to right fem to SFA bypass and right SFA angioplasty on 03/2/23; 8mm life-stent in place. She has developed dehiscence of her left groin decision.  CTA showed soft tissue stranding and trace fluid around the left femoral artery. No organized fluid collection. S/P OR 3/31 with vascular surgery for debridement of wound.   OR L groin wound cultures +C glabrata, and +C albicans; underlying graft exposed.    Vasc surg: plans (04/04) for endovasc of short segment occlusion L CFA.  Plastics: plans for skin graft pending eval (04/07).    Recommendations / Plan:  Discontinue oral fluconazole; to start IV-micafungin 100mg Q24 (cxs +C.glabrata; +C.albicans)  Discontinue Daptomycin.   Continue IV-cefepime for now   -- [Note: recent h/o R foot TMA wound dehiscence cx+ PSA (03/10/23), s/p cipro course for SSTI].   -- Podiatry following for TMA wound management.   For tx of infection of native graft (w/ stent in place), anticipate 6wk duration s/p last vasc surgery (04/04)     -- Discussed with ID staff and primary team   -- ID will continue to follow w/ further recs.        Thank you for your consult. I will follow-up with patient. Please contact us if you have any additional questions.    Demarco Victoria PA-C  Infectious Disease  [facility]    Subjective:     Principal Problem:Wound dehiscence    HPI: 82 year old female with history of right iliofemoral bypass 3/2019 c/b wound dehiscence requiring washout 4/2019 and right groin infection following a vascular bypass surgery on lifelong cephalexin suppression (indication: vascular graft retained + proximity of  abscess to graft).     Patient was admitted earlier this month with right foot gangrene. She underwent left external iliac to R SFA bypass with PTFE, left common + ext iliac artery stenting, R SFA angioplasty with drug coated balloon on 3/02. She then underwent right TMA 3/04 (surgical cx of clean bone margin neg, path neg). During her stay she did develop wound dehiscence at TMA site. Wound cultures grew pseudomonas fluorescens and she completed a course of ciprofloxacin with plan was to resume cephalexin for chronic suppression of prior graft infection after completing ciprofloxacin.    Patient was discharged home on 3/20. While at home, her  noticed her left groin wound had dehisced. Denies purulent drainage. Per patient, she is mainly bed bound. Denies fevers, chills, sweats. Denies foul odor, diarrhea, urinary contamination. Her TMA site is necrotic. Non tender. Patient followed up with vascular surgery yesterday who recommended admission.      CTA showed redemonstration of complete occlusion of the old femoral-femoral bypass. There is new lower abdominal wall postoperative changes of femoral bypass graft creation with appropriate opacification through the graft with no evidence of occlusion. There is extensive soft tissue stranding and trace of fluid around the left femoral artery. No organized fluid collection.     Primary team started Vanc/Zosyn. ID consulted for antibiotic recommendations.          Interval History: Pt remains afebrile, leukocytosis resolved. Pt continuing on dapto, cefepime, and fluconazole.  Groin wound cx +c.parapsilosis.    Review of Systems   Constitutional:  Negative for chills, diaphoresis, fever and unexpected weight change.   HENT:  Negative for dental problem, mouth sores and trouble swallowing.    Eyes:  Negative for pain and visual disturbance.   Respiratory:  Negative for cough and shortness of breath.    Cardiovascular:  Negative for chest pain and leg swelling.    Gastrointestinal:  Negative for abdominal distention, abdominal pain, blood in stool, diarrhea, nausea and vomiting.   Genitourinary:  Negative for decreased urine volume, difficulty urinating, dysuria, flank pain, frequency, hematuria and urgency.   Musculoskeletal:  Positive for myalgias (Appropriate post-op pain, stable, controlled.). Negative for arthralgias, back pain, joint swelling and neck pain.   Skin:  Negative for color change, rash and wound.   Neurological:  Negative for seizures, weakness and headaches.   Psychiatric/Behavioral:  Negative for confusion.    Objective:     Vital Signs (Most Recent):  Temp: 98.7 °F (37.1 °C) (04/03/23 1527)  Pulse: 72 (04/03/23 1527)  Resp: 18 (04/03/23 1527)  BP: 109/60 (04/03/23 1527)  SpO2: 95 % (04/03/23 1527) Vital Signs (24h Range):  Temp:  [97.3 °F (36.3 °C)-98.7 °F (37.1 °C)] 98.7 °F (37.1 °C)  Pulse:  [72-87] 72  Resp:  [18] 18  SpO2:  [91 %-95 %] 95 %  BP: ()/(53-93) 109/60     Weight: 67 kg (147 lb 11.3 oz)  Body mass index is 25.35 kg/m².    Estimated Creatinine Clearance: 34 mL/min (based on SCr of 1.2 mg/dL).    Physical Exam  Constitutional:       General: She is not in acute distress.     Appearance: Normal appearance. She is not ill-appearing, toxic-appearing or diaphoretic.   HENT:      Head: Normocephalic and atraumatic.      Mouth/Throat:      Mouth: Mucous membranes are moist.      Pharynx: Oropharynx is clear.   Eyes:      General: No scleral icterus.     Conjunctiva/sclera: Conjunctivae normal.   Cardiovascular:      Rate and Rhythm: Normal rate.      Heart sounds: Normal heart sounds.   Pulmonary:      Effort: Pulmonary effort is normal.      Breath sounds: Normal breath sounds.   Abdominal:      General: Bowel sounds are normal. There is no distension.      Palpations: Abdomen is soft.      Tenderness: There is no abdominal tenderness. There is no right CVA tenderness or left CVA tenderness.   Musculoskeletal:         General: Tenderness  (appropriate post-op TTP; surgical drain in place, draining bloody/SS fluid.) present. No swelling. Normal range of motion.      Cervical back: Normal range of motion. No rigidity or tenderness.      Right lower leg: No edema.      Left lower leg: No edema.   Lymphadenopathy:      Cervical: No cervical adenopathy.   Skin:     General: Skin is warm and dry.      Findings: No rash.   Neurological:      Mental Status: She is alert and oriented to person, place, and time. Mental status is at baseline.   Psychiatric:         Behavior: Behavior normal.         Thought Content: Thought content normal.       Significant Labs: Blood Culture:   Recent Labs   Lab 02/27/23  1923 03/08/23  2325 03/10/23  0942 03/10/23  0943 03/29/23  2009   LABBLOO No Growth after 4 days. No growth after 5 days.  No growth after 5 days. No growth after 5 days. No growth after 5 days. No Growth to date  No Growth to date  No Growth to date  No Growth to date  No Growth to date  No Growth to date  No Growth to date  No Growth to date  No Growth to date  No Growth to date     CBC:   Recent Labs   Lab 04/02/23  0325 04/02/23  0613 04/03/23  0230   WBC 10.57 10.59 9.70   HGB 7.6* 8.3* 8.1*   HCT 25.2* 27.5* 26.1*    292 287     CMP:   Recent Labs   Lab 04/02/23  0828 04/03/23  0230    139   K 3.7 3.9    103   CO2 28 25   * 197*   BUN 30* 34*   CREATININE 1.0 1.2   CALCIUM 8.9 8.8   PROT 7.4 7.0   ALBUMIN 2.3* 2.2*   BILITOT 0.7 0.7   ALKPHOS 58 57   AST 18 16   ALT 8* 7*   ANIONGAP 10 11     Wound Culture:   Recent Labs   Lab 03/04/23  1141 03/10/23  1340 03/31/23  0805 03/31/23  0809   LABAERO No growth PSEUDOMONAS FLUORESCENS GROUP  Few  * FELI ALBICANS  Rare  * FELI GLABRATA  Rare  *     All pertinent labs within the past 24 hours have been reviewed.    Significant Imaging: I have reviewed all pertinent imaging results/findings within the past 24 hours.    I personally reviewed records today as well as  relevant labs, cultures, and imaging. This includes old hospital and clinic records.     Will continue to monitor for antimicrobial toxicities/No toxicities from antimicrobials    Patient is high risk for infectious complications given advanced age, multiple co morbidities, and case complexity.    Time: 50 minutes   50% of time spent on face-to-face counseling and coordination of care. Counseling included review of test results, diagnosis, and treatment plan with patient and/or family.

## 2023-04-04 NOTE — NURSING TRANSFER
Nursing Transfer Note      4/4/2023     Reason patient is being transferred: OR vascular angiogram left extremity    Transfer To: OR    Transfer via bed    Transfer with IV FLUIDS    Transported by TECH X 2    Medicines sent: N/A    Any special needs or follow-up needed: 1 UNIT PRBC TO BE GIVEN awaiting type and screen    Chart send with patient: Yes    Notified: spouse, daughter    Pt off unit.

## 2023-04-04 NOTE — NURSING TRANSFER
Nursing Transfer Note      4/4/2023     Reason patient is being transferred: post procedure    Transfer To: 1004    Transfer via bed    Transfer with n/a    Transported by transport    Medicines sent: n/a    Any special needs or follow-up needed: n/a    Chart send with patient: Yes    Notified: daughter    Patient reassessed at: 4/4/23 @1460

## 2023-04-04 NOTE — CONSULTS
HILARIO placed 20g 1 3/4 PIV in LFA & placed 20g, 8 cm Midline in left basilic vein using u/s guidance.  Max dwell date 5/3/2023.  Lot # [id number]..    MIDLINE inserted for long term PVA

## 2023-04-04 NOTE — ASSESSMENT & PLAN NOTE
Nelsy Marino is a 82 y.o. woman with history of HTN, T2DM, DVT, and PAD who underwent left to right fem to SFA bypass and right SFA angioplasty on 3/2/23 and was admitted until 3/20. She was seen by Dr. Dunbar in the office earlier today complaining of left rest pain and also found to have dehiscence of her left groin incision.  S/p wound washout and flip with plastics 3/31    - bed rest and wound vac on at all times, per plastics  - cont heparin gtt  - ID following, appreciate recs- cefipime    - f/u OR wound cultures. Gram stain no organisms   - NPO for angio today  - Insulin SS  - Home meds  - Asa plavix statin  - Podiatry consulted for right TMA wound    Plan for OR Tuesday (4/4) for endo revascularization of short segment occlusion left CFA  - to perform dylan's test  - to be consent and marked today  - NPO MN 4/3

## 2023-04-04 NOTE — NURSING TRANSFER
Nursing Transfer Note      4/4/2023     Reason patient is being transferred: to unit room 1004     Transfer From: PACU    Transfer via bed    Transfer with N/A    Transported by TECH    Medicines sent: N/A    Any special needs or follow-up needed: N/A    Chart send with patient: Yes    Notified: spouse, daughter    Upon arrival to floor: patient oriented to room, call bell in reach, and bed in lowest position, vitals maintained, sx dressing clean, dry. Intact. Upon arrival o2 sats are reading 84% on room air, initiated nasal cannula 2 liters  sats up to 93%

## 2023-04-04 NOTE — BRIEF OP NOTE
[facility] (2nd Fl)  Brief Operative Note    SUMMARY     Surgery Date: 4/4/2023     Surgeon(s) and Role:     * René Simms MD - Primary     * Ramana Navarro MD - Fellow    Assisting Surgeon: None    Pre-op Diagnosis:  PAD (peripheral artery disease) with rest pain    Post-op Diagnosis:  same    Procedure(s) (LRB):  Left lower extremity angiogram  PTA of SFA and CFA 6 x 60 mm shockwave balloon used for 5 cycles.   Anesthesia: Choice    Operative Findings: chronic disease in distal CFA. Mid to distal SFA also treated with good result. Reestablished good flow through CFA three vessel runoff to foot     Estimated Blood Loss: * No values recorded between 4/4/2023  9:33 AM and 4/4/2023 11:04 AM *    Estimated Blood Loss has been documented.         Specimens:   Specimen (24h ago, onward)      None            [id number]    Ramana Navarro MD PGY7  Vascular Surgery Fellow  [phone]

## 2023-04-04 NOTE — SUBJECTIVE & OBJECTIVE
Medications:  Continuous Infusions:   heparin (porcine) in D5W Stopped (04/04/23 0000)    lactated ringers       Scheduled Meds:   acetaminophen  650 mg Oral Q6H    amLODIPine  10 mg Oral Daily    ammonium lactate   Topical (Top) Daily    aspirin  81 mg Oral Daily    atorvastatin  40 mg Oral QHS    carvediloL  12.5 mg Oral BID    ceFEPime (MAXIPIME) IVPB  2 g Intravenous Q12H    clopidogreL  75 mg Oral Daily    DULoxetine  30 mg Oral BID    furosemide  20 mg Oral Daily    micafungin (MYCAMINE) IVPB  100 mg Intravenous Q24H    pantoprazole  40 mg Oral BID     PRN Meds:sodium chloride, dextrose 10%, glucagon (human recombinant), heparin (PORCINE), heparin (PORCINE), insulin aspart U-100, ondansetron, oxyCODONE, sodium chloride 0.9%     Objective:     Vital Signs (Most Recent):  Temp: 98.5 °F (36.9 °C) (04/04/23 0708)  Pulse: 75 (04/04/23 0708)  Resp: 18 (04/04/23 0708)  BP: 135/60 (04/04/23 0708)  SpO2: (!) 94 % (04/04/23 0708)   Vital Signs (24h Range):  Temp:  [97.3 °F (36.3 °C)-98.7 °F (37.1 °C)] 98.5 °F (36.9 °C)  Pulse:  [72-81] 75  Resp:  [18-20] 18  SpO2:  [93 %-95 %] 94 %  BP: (109-138)/(56-60) 135/60         Physical Exam  Constitutional:       General: She is not in acute distress.     Appearance: She is not ill-appearing.   HENT:      Head: Normocephalic.      Mouth/Throat:      Mouth: Mucous membranes are moist.   Cardiovascular:      Rate and Rhythm: Normal rate.      Comments:   Left: At monophasic signal, PT monophasic signal  Ney test negative   Pulmonary:      Effort: Pulmonary effort is normal. No respiratory distress.   Abdominal:      Palpations: Abdomen is soft.   Musculoskeletal:         General: Normal range of motion.   Skin:     General: Skin is warm and dry.      Capillary Refill: Capillary refill takes less than 2 seconds.      Comments: Left groin incision with wound vac in place. Good seal and on suction   Neurological:      General: No focal deficit present.      Mental Status: She is  alert.       Significant Labs:  CBC:   Recent Labs   Lab 04/04/23  0335   WBC 9.20   RBC 2.77*   HGB 7.2*   HCT 23.4*      MCV 85   MCH 26.0*   MCHC 30.8*     CMP:   Recent Labs   Lab 04/04/23  0335   *   CALCIUM 8.7   ALBUMIN 2.0*   PROT 6.6   *   K 3.7   CO2 25      BUN 41*   CREATININE 1.2   ALKPHOS 61   ALT 8*   AST 19   BILITOT 0.6       Significant Diagnostics:  I have reviewed all pertinent imaging results/findings within the past 24 hours.

## 2023-04-04 NOTE — SUBJECTIVE & OBJECTIVE
Interval History: Pt remains afebrile, leukocytosis resolved. Pt continuing on dapto, cefepime, and fluconazole.  Groin wound cx +c.parapsilosis.    Review of Systems   Constitutional:  Negative for chills, diaphoresis, fever and unexpected weight change.   HENT:  Negative for dental problem, mouth sores and trouble swallowing.    Eyes:  Negative for pain and visual disturbance.   Respiratory:  Negative for cough and shortness of breath.    Cardiovascular:  Negative for chest pain and leg swelling.   Gastrointestinal:  Negative for abdominal distention, abdominal pain, blood in stool, diarrhea, nausea and vomiting.   Genitourinary:  Negative for decreased urine volume, difficulty urinating, dysuria, flank pain, frequency, hematuria and urgency.   Musculoskeletal:  Positive for myalgias (Appropriate post-op pain, stable, controlled.). Negative for arthralgias, back pain, joint swelling and neck pain.   Skin:  Negative for color change, rash and wound.   Neurological:  Negative for seizures, weakness and headaches.   Psychiatric/Behavioral:  Negative for confusion.    Objective:     Vital Signs (Most Recent):  Temp: 98.7 °F (37.1 °C) (04/03/23 1527)  Pulse: 72 (04/03/23 1527)  Resp: 18 (04/03/23 1527)  BP: 109/60 (04/03/23 1527)  SpO2: 95 % (04/03/23 1527) Vital Signs (24h Range):  Temp:  [97.3 °F (36.3 °C)-98.7 °F (37.1 °C)] 98.7 °F (37.1 °C)  Pulse:  [72-87] 72  Resp:  [18] 18  SpO2:  [91 %-95 %] 95 %  BP: ()/(53-93) 109/60     Weight: 67 kg (147 lb 11.3 oz)  Body mass index is 25.35 kg/m².    Estimated Creatinine Clearance: 34 mL/min (based on SCr of 1.2 mg/dL).    Physical Exam  Constitutional:       General: She is not in acute distress.     Appearance: Normal appearance. She is not ill-appearing, toxic-appearing or diaphoretic.   HENT:      Head: Normocephalic and atraumatic.      Mouth/Throat:      Mouth: Mucous membranes are moist.      Pharynx: Oropharynx is clear.   Eyes:      General: No scleral  icterus.     Conjunctiva/sclera: Conjunctivae normal.   Cardiovascular:      Rate and Rhythm: Normal rate.      Heart sounds: Normal heart sounds.   Pulmonary:      Effort: Pulmonary effort is normal.      Breath sounds: Normal breath sounds.   Abdominal:      General: Bowel sounds are normal. There is no distension.      Palpations: Abdomen is soft.      Tenderness: There is no abdominal tenderness. There is no right CVA tenderness or left CVA tenderness.   Musculoskeletal:         General: Tenderness (appropriate post-op TTP; surgical drain in place, draining bloody/SS fluid.) present. No swelling. Normal range of motion.      Cervical back: Normal range of motion. No rigidity or tenderness.      Right lower leg: No edema.      Left lower leg: No edema.   Lymphadenopathy:      Cervical: No cervical adenopathy.   Skin:     General: Skin is warm and dry.      Findings: No rash.   Neurological:      Mental Status: She is alert and oriented to person, place, and time. Mental status is at baseline.   Psychiatric:         Behavior: Behavior normal.         Thought Content: Thought content normal.       Significant Labs: Blood Culture:   Recent Labs   Lab 02/27/23  1923 03/08/23  2325 03/10/23  0942 03/10/23  0943 03/29/23  2009   LABBLOO No Growth after 4 days. No growth after 5 days.  No growth after 5 days. No growth after 5 days. No growth after 5 days. No Growth to date  No Growth to date  No Growth to date  No Growth to date  No Growth to date  No Growth to date  No Growth to date  No Growth to date  No Growth to date  No Growth to date     CBC:   Recent Labs   Lab 04/02/23  0325 04/02/23  0613 04/03/23  0230   WBC 10.57 10.59 9.70   HGB 7.6* 8.3* 8.1*   HCT 25.2* 27.5* 26.1*    292 287     CMP:   Recent Labs   Lab 04/02/23  0828 04/03/23  0230    139   K 3.7 3.9    103   CO2 28 25   * 197*   BUN 30* 34*   CREATININE 1.0 1.2   CALCIUM 8.9 8.8   PROT 7.4 7.0   ALBUMIN 2.3* 2.2*    BILITOT 0.7 0.7   ALKPHOS 58 57   AST 18 16   ALT 8* 7*   ANIONGAP 10 11     Wound Culture:   Recent Labs   Lab 03/04/23  1141 03/10/23  1340 03/31/23  0805 03/31/23  0809   LABAERO No growth PSEUDOMONAS FLUORESCENS GROUP  Few  * FELI ALBICANS  Rare  * FELI GLABRATA  Rare  *     All pertinent labs within the past 24 hours have been reviewed.    Significant Imaging: I have reviewed all pertinent imaging results/findings within the past 24 hours.    I personally reviewed records today as well as relevant labs, cultures, and imaging. This includes old hospital and clinic records.     Will continue to monitor for antimicrobial toxicities/No toxicities from antimicrobials    Patient is high risk for infectious complications given advanced age, multiple co morbidities, and case complexity.    Time: 50 minutes   50% of time spent on face-to-face counseling and coordination of care. Counseling included review of test results, diagnosis, and treatment plan with patient and/or family.

## 2023-04-04 NOTE — NURSING
Spoke with doc on call, they will decide on prbc @ a later time, working on IV access, antx and starting heparin currently. Midline team consulted, will continue to monitor pt, pt aaox4 no acute distress noted, vitals wnl.

## 2023-04-04 NOTE — PROGRESS NOTES
[facility]  Vascular Surgery  Progress Note    Patient Name: Nelsy Marino  MRN: [id number]  Admission Date: 3/29/2023  Primary Care Provider: Juan F Garay    Subjective:     Interval History: Vss, nadn.  Patient to OR today for angio, 1u prbc given prior.    Post-Op Info:  Procedure(s) (LRB):  DEBRIDEMENT, WOUND (Left)  CREATION, FLAP, MUSCLE ROTATION (Left)   4 Days Post-Op       Medications:  Continuous Infusions:   heparin (porcine) in D5W Stopped (04/04/23 0000)    lactated ringers       Scheduled Meds:   acetaminophen  650 mg Oral Q6H    amLODIPine  10 mg Oral Daily    ammonium lactate   Topical (Top) Daily    aspirin  81 mg Oral Daily    atorvastatin  40 mg Oral QHS    carvediloL  12.5 mg Oral BID    ceFEPime (MAXIPIME) IVPB  2 g Intravenous Q12H    clopidogreL  75 mg Oral Daily    DULoxetine  30 mg Oral BID    furosemide  20 mg Oral Daily    micafungin (MYCAMINE) IVPB  100 mg Intravenous Q24H    pantoprazole  40 mg Oral BID     PRN Meds:sodium chloride, dextrose 10%, glucagon (human recombinant), heparin (PORCINE), heparin (PORCINE), insulin aspart U-100, ondansetron, oxyCODONE, sodium chloride 0.9%     Objective:     Vital Signs (Most Recent):  Temp: 98.5 °F (36.9 °C) (04/04/23 0708)  Pulse: 75 (04/04/23 0708)  Resp: 18 (04/04/23 0708)  BP: 135/60 (04/04/23 0708)  SpO2: (!) 94 % (04/04/23 0708)   Vital Signs (24h Range):  Temp:  [97.3 °F (36.3 °C)-98.7 °F (37.1 °C)] 98.5 °F (36.9 °C)  Pulse:  [72-81] 75  Resp:  [18-20] 18  SpO2:  [93 %-95 %] 94 %  BP: (109-138)/(56-60) 135/60         Physical Exam  Constitutional:       General: She is not in acute distress.     Appearance: She is not ill-appearing.   HENT:      Head: Normocephalic.      Mouth/Throat:      Mouth: Mucous membranes are moist.   Cardiovascular:      Rate and Rhythm: Normal rate.      Comments:   Left: At monophasic signal, PT monophasic signal  Ney test negative   Pulmonary:      Effort: Pulmonary effort is normal. No respiratory  distress.   Abdominal:      Palpations: Abdomen is soft.   Musculoskeletal:         General: Normal range of motion.   Skin:     General: Skin is warm and dry.      Capillary Refill: Capillary refill takes less than 2 seconds.      Comments: Left groin incision with wound vac in place. Good seal and on suction   Neurological:      General: No focal deficit present.      Mental Status: She is alert.       Significant Labs:  CBC:   Recent Labs   Lab 04/04/23  0335   WBC 9.20   RBC 2.77*   HGB 7.2*   HCT 23.4*      MCV 85   MCH 26.0*   MCHC 30.8*     CMP:   Recent Labs   Lab 04/04/23  0335   *   CALCIUM 8.7   ALBUMIN 2.0*   PROT 6.6   *   K 3.7   CO2 25      BUN 41*   CREATININE 1.2   ALKPHOS 61   ALT 8*   AST 19   BILITOT 0.6       Significant Diagnostics:  I have reviewed all pertinent imaging results/findings within the past 24 hours.    Assessment/Plan:     PVD (peripheral vascular disease)  Nelsy Marino is a 82 y.o. woman with history of HTN, T2DM, DVT, and PAD who underwent left to right fem to SFA bypass and right SFA angioplasty on 3/2/23 and was admitted until 3/20. She was seen by Dr. Dunbar in the office earlier today complaining of left rest pain and also found to have dehiscence of her left groin incision.  S/p wound washout and flip with plastics 3/31    - bed rest and wound vac on at all times, per plastics  - cont heparin gtt  - ID following, appreciate recs- cefipime    - f/u OR wound cultures. Gram stain no organisms   - NPO for angio today  - Insulin SS  - Home meds  - Asa plavix statin  - Podiatry consulted for right TMA wound    Plan for OR Tuesday (4/4) for endo revascularization of short segment occlusion left CFA  - to perform dylan's test  - to be consent and marked today  - NPO MN 4/3        Samantha Ledesma NP  Vascular Surgery  [facility]

## 2023-04-04 NOTE — SUBJECTIVE & OBJECTIVE
Interval History: Pt remains afebrile; leukocytosis 13 (up from 9). Pt continuing on cefepime, and micafungin (+c. Glabrata, +C albicans)  --Micro working up sensitivities of C. Glabrata.   Pt taken to OR today for endovasc procedure -- chronic disease in distal CFA. Mid to distal SFA also treated with good result. Reestablished good flow through CFA three vessel runoff to foot .    Review of Systems   Constitutional:  Negative for chills, diaphoresis, fever and unexpected weight change.   HENT:  Negative for dental problem, mouth sores and trouble swallowing.    Eyes:  Negative for pain and visual disturbance.   Respiratory:  Negative for cough and shortness of breath.    Cardiovascular:  Negative for chest pain and leg swelling.   Gastrointestinal:  Negative for abdominal distention, abdominal pain, blood in stool, diarrhea, nausea and vomiting.   Genitourinary:  Negative for decreased urine volume, difficulty urinating, dysuria, flank pain, frequency, hematuria and urgency.   Musculoskeletal:  Positive for myalgias (Appropriate post-op pain, stable, controlled.). Negative for arthralgias, back pain, joint swelling and neck pain.   Skin:  Negative for color change, rash and wound.   Neurological:  Negative for seizures, weakness and headaches.   Psychiatric/Behavioral:  Negative for confusion.    Objective:     Vital Signs (Most Recent):  Temp: 97.5 °F (36.4 °C) (04/04/23 1615)  Pulse: 80 (04/04/23 1615)  Resp: 18 (04/04/23 1615)  BP: (!) 148/65 (04/04/23 1615)  SpO2: 95 % (04/04/23 1615) Vital Signs (24h Range):  Temp:  [97.4 °F (36.3 °C)-98.5 °F (36.9 °C)] 97.5 °F (36.4 °C)  Pulse:  [66-80] 80  Resp:  [16-22] 18  SpO2:  [84 %-100 %] 95 %  BP: (101-148)/(50-67) 148/65     Weight: 67 kg (147 lb 11.3 oz)  Body mass index is 25.35 kg/m².    Estimated Creatinine Clearance: 34 mL/min (based on SCr of 1.2 mg/dL).    Physical Exam  Constitutional:       General: She is not in acute distress.     Appearance: Normal  appearance. She is not ill-appearing, toxic-appearing or diaphoretic.   HENT:      Head: Normocephalic and atraumatic.      Mouth/Throat:      Mouth: Mucous membranes are moist.      Pharynx: Oropharynx is clear.   Eyes:      General: No scleral icterus.     Conjunctiva/sclera: Conjunctivae normal.   Cardiovascular:      Rate and Rhythm: Normal rate.      Heart sounds: Normal heart sounds.   Pulmonary:      Effort: Pulmonary effort is normal.      Breath sounds: Normal breath sounds.   Abdominal:      General: Bowel sounds are normal. There is no distension.      Palpations: Abdomen is soft.      Tenderness: There is no abdominal tenderness. There is no right CVA tenderness or left CVA tenderness.   Musculoskeletal:         General: Tenderness (appropriate post-op TTP; surgical drain in place, draining bloody/SS fluid.) present. No swelling. Normal range of motion.      Cervical back: Normal range of motion. No rigidity or tenderness.      Right lower leg: No edema.      Left lower leg: No edema.   Lymphadenopathy:      Cervical: No cervical adenopathy.   Skin:     General: Skin is warm and dry.      Findings: No rash.   Neurological:      Mental Status: She is alert and oriented to person, place, and time. Mental status is at baseline.   Psychiatric:         Behavior: Behavior normal.         Thought Content: Thought content normal.       Significant Labs: Blood Culture:   Recent Labs   Lab 02/27/23  1923 03/08/23  2325 03/10/23  0942 03/10/23  0943 03/29/23  2009   LABBLOO No Growth after 4 days. No growth after 5 days.  No growth after 5 days. No growth after 5 days. No growth after 5 days. No growth after 5 days.  No growth after 5 days.       CBC:   Recent Labs   Lab 04/03/23  0230 04/04/23  0335 04/04/23  1215   WBC 9.70 9.20 13.11*   HGB 8.1* 7.2* 7.5*   HCT 26.1* 23.4* 25.5*    296 253       CMP:   Recent Labs   Lab 04/03/23  0230 04/04/23  0335    135*   K 3.9 3.7    101   CO2 25 25    * 185*   BUN 34* 41*   CREATININE 1.2 1.2   CALCIUM 8.8 8.7   PROT 7.0 6.6   ALBUMIN 2.2* 2.0*   BILITOT 0.7 0.6   ALKPHOS 57 61   AST 16 19   ALT 7* 8*   ANIONGAP 11 9       Wound Culture:   Recent Labs   Lab 03/04/23  1141 03/10/23  1340 03/31/23  0805 03/31/23  0809   LABAERO No growth PSEUDOMONAS FLUORESCENS GROUP  Few  * FELI ALBICANS  Rare  * FELI GLABRATA  Rare  Susceptibility pending  *       All pertinent labs within the past 24 hours have been reviewed.    Significant Imaging: I have reviewed all pertinent imaging results/findings within the past 24 hours.    I personally reviewed records today as well as relevant labs, cultures, and imaging. This includes old hospital and clinic records.     Will continue to monitor for antimicrobial toxicities/No toxicities from antimicrobials    Patient is high risk for infectious complications given advanced age, multiple co morbidities, and case complexity.    Time: 50 minutes   50% of time spent on face-to-face counseling and coordination of care. Counseling included review of test results, diagnosis, and treatment plan with patient and/or family.

## 2023-04-04 NOTE — ASSESSMENT & PLAN NOTE
Patient with history of left to right fem to SFA bypass and right SFA angioplasty on 03/2/23; 8mm life-stent in place. She has developed dehiscence of her left groin decision.  CTA showed soft tissue stranding and trace fluid around the left femoral artery. No organized fluid collection. S/P OR 3/31 with vascular surgery for debridement of wound.   OR L groin wound cultures +C glabrata, and +C albicans; underlying graft exposed.   -- Micro working up sensitivities for C.glabrata.    Vasc surg: pt now s/p (04/04) endovasc of short segment occlusion L CFA.  Plastics: plans for skin graft pending eval (04/07).    Recommendations / Plan:  · Continue IV-micafungin 100mg Q24 (cxs +C.glabrata; +C.albicans)  · Continue IV-Cefepime 2 g Q12h (renally dosed CrCl 34)  -- [Note: recent h/o R foot TMA wound dehiscence cx+ PSA (03/10/23), s/p cipro course for SSTI].   -- Podiatry following for TMA wound management.   · For tx of infection of native graft (w/ stent in place), recommend 6wk duration s/p last vasc surgery (04/04); [GABE 05/16/23].    -- ID will schedule pt for f/u appt in ID clinic in 2-3wks    -- Discussed with ID staff and primary team   -- ID will sign-off; see OPAT note below for outpt IV abx plans.      Outpatient Antibiotic Therapy Plan:    Please send referral to [facility].    1) Infection :   Wound Infection with exposed underlying native graft (w/ stent in place)  [wound cx +C.glabrata, +C.albicans]    2) Discharge Antibiotics:     Intravenous antibiotics:   IV - Cefepime 2 g Q12h  [renally dosed CrCl 34 mL/min].   IV - Micafungin 100mg Q24h      3) Therapy Duration:    6wks s/p (04/04)    Estimated end date of IV antibiotics:    05/16/23    4) Outpatient Weekly Labs:    Order the following labs to be drawn on Mondays:    CBC   CMP    CRP     CPK (when on Daptomycin)   Vancomycin trough. Target 15-20    If discharged on vancomycin IV, order the following additional  labs to be drawn on Thursdays:   CMP    Vancomycin trough. Target 15-20    If vancomycin trough is not at target (15-20) prior to discharge, schedule vancomycin trough to be drawn before their fourth outpatient dose.    5) Fax Lab Results to Infectious Diseases Provider:  Demarco Victoria PA-C    [facility] Fax Number: [phone]    6) Outpatient Infectious Diseases Follow-up     Follow-up appointment will be arranged by the ID clinic and will be found in the patient's appointments tab.     Prior to discharge, please ensure the patient's follow-up appt has been scheduled with ID-Clinic -- for patient convenience and continuity of care.   If there is still no follow-up scheduled prior to discharge, please send an EPIC message to Yue Arteaga LPN in Infectious Diseases.

## 2023-04-05 LAB
ALBUMIN SERPL BCP-MCNC: 2 G/DL (ref 3.5–5.2)
ALP SERPL-CCNC: 61 U/L (ref 55–135)
ALT SERPL W/O P-5'-P-CCNC: 8 U/L (ref 10–44)
ANION GAP SERPL CALC-SCNC: 8 MMOL/L (ref 8–16)
APTT PPP: 38.5 SEC (ref 21–32)
APTT PPP: 53.2 SEC (ref 21–32)
APTT PPP: 54.3 SEC (ref 21–32)
AST SERPL-CCNC: 21 U/L (ref 10–40)
BACTERIA SPEC ANAEROBE CULT: ABNORMAL
BASOPHILS # BLD AUTO: 0.02 K/UL (ref 0–0.2)
BASOPHILS # BLD AUTO: 0.02 K/UL (ref 0–0.2)
BASOPHILS NFR BLD: 0.2 % (ref 0–1.9)
BASOPHILS NFR BLD: 0.2 % (ref 0–1.9)
BILIRUB SERPL-MCNC: 0.5 MG/DL (ref 0.1–1)
BUN SERPL-MCNC: 24 MG/DL (ref 8–23)
CALCIUM SERPL-MCNC: 8.4 MG/DL (ref 8.7–10.5)
CHLORIDE SERPL-SCNC: 105 MMOL/L (ref 95–110)
CO2 SERPL-SCNC: 23 MMOL/L (ref 23–29)
CREAT SERPL-MCNC: 0.8 MG/DL (ref 0.5–1.4)
DIFFERENTIAL METHOD: ABNORMAL
DIFFERENTIAL METHOD: ABNORMAL
EOSINOPHIL # BLD AUTO: 0.4 K/UL (ref 0–0.5)
EOSINOPHIL # BLD AUTO: 0.4 K/UL (ref 0–0.5)
EOSINOPHIL NFR BLD: 4.4 % (ref 0–8)
EOSINOPHIL NFR BLD: 4.7 % (ref 0–8)
ERYTHROCYTE [DISTWIDTH] IN BLOOD BY AUTOMATED COUNT: 15.3 % (ref 11.5–14.5)
ERYTHROCYTE [DISTWIDTH] IN BLOOD BY AUTOMATED COUNT: 15.5 % (ref 11.5–14.5)
EST. GFR  (NO RACE VARIABLE): >60 ML/MIN/1.73 M^2
GLUCOSE SERPL-MCNC: 211 MG/DL (ref 70–110)
HCT VFR BLD AUTO: 23.8 % (ref 37–48.5)
HCT VFR BLD AUTO: 25.1 % (ref 37–48.5)
HGB BLD-MCNC: 7.4 G/DL (ref 12–16)
HGB BLD-MCNC: 7.6 G/DL (ref 12–16)
IMM GRANULOCYTES # BLD AUTO: 0.03 K/UL (ref 0–0.04)
IMM GRANULOCYTES # BLD AUTO: 0.04 K/UL (ref 0–0.04)
IMM GRANULOCYTES NFR BLD AUTO: 0.3 % (ref 0–0.5)
IMM GRANULOCYTES NFR BLD AUTO: 0.4 % (ref 0–0.5)
LYMPHOCYTES # BLD AUTO: 1.5 K/UL (ref 1–4.8)
LYMPHOCYTES # BLD AUTO: 1.6 K/UL (ref 1–4.8)
LYMPHOCYTES NFR BLD: 16.1 % (ref 18–48)
LYMPHOCYTES NFR BLD: 16.8 % (ref 18–48)
MCH RBC QN AUTO: 26.1 PG (ref 27–31)
MCH RBC QN AUTO: 26.4 PG (ref 27–31)
MCHC RBC AUTO-ENTMCNC: 30.3 G/DL (ref 32–36)
MCHC RBC AUTO-ENTMCNC: 31.1 G/DL (ref 32–36)
MCV RBC AUTO: 85 FL (ref 82–98)
MCV RBC AUTO: 86 FL (ref 82–98)
MONOCYTES # BLD AUTO: 0.5 K/UL (ref 0.3–1)
MONOCYTES # BLD AUTO: 0.5 K/UL (ref 0.3–1)
MONOCYTES NFR BLD: 5.5 % (ref 4–15)
MONOCYTES NFR BLD: 5.6 % (ref 4–15)
NEUTROPHILS # BLD AUTO: 6.7 K/UL (ref 1.8–7.7)
NEUTROPHILS # BLD AUTO: 6.8 K/UL (ref 1.8–7.7)
NEUTROPHILS NFR BLD: 72.6 % (ref 38–73)
NEUTROPHILS NFR BLD: 73.2 % (ref 38–73)
NRBC BLD-RTO: 0 /100 WBC
NRBC BLD-RTO: 0 /100 WBC
PLATELET # BLD AUTO: 263 K/UL (ref 150–450)
PLATELET # BLD AUTO: 271 K/UL (ref 150–450)
PMV BLD AUTO: 10.9 FL (ref 9.2–12.9)
PMV BLD AUTO: 11.2 FL (ref 9.2–12.9)
POCT GLUCOSE: 204 MG/DL (ref 70–110)
POCT GLUCOSE: 227 MG/DL (ref 70–110)
POCT GLUCOSE: 234 MG/DL (ref 70–110)
POCT GLUCOSE: 266 MG/DL (ref 70–110)
POTASSIUM SERPL-SCNC: 3.9 MMOL/L (ref 3.5–5.1)
PROT SERPL-MCNC: 6.3 G/DL (ref 6–8.4)
RBC # BLD AUTO: 2.8 M/UL (ref 4–5.4)
RBC # BLD AUTO: 2.91 M/UL (ref 4–5.4)
SODIUM SERPL-SCNC: 136 MMOL/L (ref 136–145)
WBC # BLD AUTO: 9.24 K/UL (ref 3.9–12.7)
WBC # BLD AUTO: 9.3 K/UL (ref 3.9–12.7)

## 2023-04-05 PROCEDURE — A4216 STERILE WATER/SALINE, 10 ML: HCPCS | Performed by: SURGERY

## 2023-04-05 PROCEDURE — 76937 US GUIDE VASCULAR ACCESS: CPT

## 2023-04-05 PROCEDURE — 63600175 PHARM REV CODE 636 W HCPCS: Performed by: STUDENT IN AN ORGANIZED HEALTH CARE EDUCATION/TRAINING PROGRAM

## 2023-04-05 PROCEDURE — 99024 PR POST-OP FOLLOW-UP VISIT: ICD-10-PCS | Mod: ,,, | Performed by: SURGERY

## 2023-04-05 PROCEDURE — 25000003 PHARM REV CODE 250: Performed by: STUDENT IN AN ORGANIZED HEALTH CARE EDUCATION/TRAINING PROGRAM

## 2023-04-05 PROCEDURE — 85730 THROMBOPLASTIN TIME PARTIAL: CPT | Mod: 91 | Performed by: STUDENT IN AN ORGANIZED HEALTH CARE EDUCATION/TRAINING PROGRAM

## 2023-04-05 PROCEDURE — 85025 COMPLETE CBC W/AUTO DIFF WBC: CPT | Performed by: STUDENT IN AN ORGANIZED HEALTH CARE EDUCATION/TRAINING PROGRAM

## 2023-04-05 PROCEDURE — 80053 COMPREHEN METABOLIC PANEL: CPT | Performed by: STUDENT IN AN ORGANIZED HEALTH CARE EDUCATION/TRAINING PROGRAM

## 2023-04-05 PROCEDURE — 99024 POSTOP FOLLOW-UP VISIT: CPT | Mod: ,,, | Performed by: SURGERY

## 2023-04-05 PROCEDURE — 20600001 HC STEP DOWN PRIVATE ROOM

## 2023-04-05 PROCEDURE — C1751 CATH, INF, PER/CENT/MIDLINE: HCPCS

## 2023-04-05 PROCEDURE — 36415 COLL VENOUS BLD VENIPUNCTURE: CPT | Performed by: STUDENT IN AN ORGANIZED HEALTH CARE EDUCATION/TRAINING PROGRAM

## 2023-04-05 PROCEDURE — 25000003 PHARM REV CODE 250: Performed by: SURGERY

## 2023-04-05 PROCEDURE — 97162 PT EVAL MOD COMPLEX 30 MIN: CPT

## 2023-04-05 PROCEDURE — 36573 INSJ PICC RS&I 5 YR+: CPT

## 2023-04-05 RX ORDER — SODIUM CHLORIDE 0.9 % (FLUSH) 0.9 %
10 SYRINGE (ML) INJECTION
Status: DISCONTINUED | OUTPATIENT
Start: 2023-04-05 | End: 2023-04-11 | Stop reason: HOSPADM

## 2023-04-05 RX ORDER — SODIUM CHLORIDE 0.9 % (FLUSH) 0.9 %
10 SYRINGE (ML) INJECTION EVERY 6 HOURS
Status: DISCONTINUED | OUTPATIENT
Start: 2023-04-05 | End: 2023-04-11 | Stop reason: HOSPADM

## 2023-04-05 RX ADMIN — Medication: at 08:04

## 2023-04-05 RX ADMIN — ASPIRIN 81 MG: 81 TABLET, COATED ORAL at 09:04

## 2023-04-05 RX ADMIN — CARVEDILOL 12.5 MG: 12.5 TABLET, FILM COATED ORAL at 08:04

## 2023-04-05 RX ADMIN — CLOPIDOGREL BISULFATE 75 MG: 75 TABLET ORAL at 09:04

## 2023-04-05 RX ADMIN — PANTOPRAZOLE SODIUM 40 MG: 40 TABLET, DELAYED RELEASE ORAL at 08:04

## 2023-04-05 RX ADMIN — INSULIN ASPART 4 UNITS: 100 INJECTION, SOLUTION INTRAVENOUS; SUBCUTANEOUS at 12:04

## 2023-04-05 RX ADMIN — MICAFUNGIN SODIUM 100 MG: 100 INJECTION, POWDER, LYOPHILIZED, FOR SOLUTION INTRAVENOUS at 08:04

## 2023-04-05 RX ADMIN — ACETAMINOPHEN 650 MG: 325 TABLET ORAL at 12:04

## 2023-04-05 RX ADMIN — HEPARIN SODIUM AND DEXTROSE 30 UNITS/KG/HR: 10000; 5 INJECTION INTRAVENOUS at 12:04

## 2023-04-05 RX ADMIN — Medication 10 ML: at 12:04

## 2023-04-05 RX ADMIN — CARVEDILOL 12.5 MG: 12.5 TABLET, FILM COATED ORAL at 09:04

## 2023-04-05 RX ADMIN — CEFEPIME 2 G: 2 INJECTION, POWDER, FOR SOLUTION INTRAVENOUS at 03:04

## 2023-04-05 RX ADMIN — CEFEPIME 2 G: 2 INJECTION, POWDER, FOR SOLUTION INTRAVENOUS at 02:04

## 2023-04-05 RX ADMIN — FUROSEMIDE 20 MG: 20 TABLET ORAL at 09:04

## 2023-04-05 RX ADMIN — ACETAMINOPHEN 650 MG: 325 TABLET ORAL at 06:04

## 2023-04-05 RX ADMIN — OXYCODONE HYDROCHLORIDE 5 MG: 5 TABLET ORAL at 08:04

## 2023-04-05 RX ADMIN — DULOXETINE 30 MG: 30 CAPSULE, DELAYED RELEASE ORAL at 09:04

## 2023-04-05 RX ADMIN — AMLODIPINE BESYLATE 10 MG: 10 TABLET ORAL at 09:04

## 2023-04-05 RX ADMIN — OXYCODONE HYDROCHLORIDE 5 MG: 5 TABLET ORAL at 06:04

## 2023-04-05 RX ADMIN — OXYCODONE HYDROCHLORIDE 5 MG: 5 TABLET ORAL at 12:04

## 2023-04-05 RX ADMIN — INSULIN ASPART 6 UNITS: 100 INJECTION, SOLUTION INTRAVENOUS; SUBCUTANEOUS at 06:04

## 2023-04-05 RX ADMIN — INSULIN ASPART 2 UNITS: 100 INJECTION, SOLUTION INTRAVENOUS; SUBCUTANEOUS at 12:04

## 2023-04-05 RX ADMIN — INSULIN ASPART 4 UNITS: 100 INJECTION, SOLUTION INTRAVENOUS; SUBCUTANEOUS at 06:04

## 2023-04-05 RX ADMIN — PANTOPRAZOLE SODIUM 40 MG: 40 TABLET, DELAYED RELEASE ORAL at 09:04

## 2023-04-05 RX ADMIN — DULOXETINE 30 MG: 30 CAPSULE, DELAYED RELEASE ORAL at 08:04

## 2023-04-05 RX ADMIN — ATORVASTATIN CALCIUM 40 MG: 40 TABLET, FILM COATED ORAL at 08:04

## 2023-04-05 NOTE — PLAN OF CARE
Eval complete and POC established.    2023    Problem: Physical Therapy  Goal: Physical Therapy Goal  Description: Goals to be met by: 2023     Patient will increase functional independence with mobility by performin. Supine to sit with Addy  2. Sit to supine with Addy  3. Sit to stand transfer with Nilda using RW  4. Bed to chair transfer with Nilda using Rolling Walker  5. Gait  x 10 feet with Minimal Assistance using Rolling Walker.   6. Lower extremity exercise program x15 reps per handout, with supervision    Outcome: Ongoing, Progressing

## 2023-04-05 NOTE — ASSESSMENT & PLAN NOTE
Plan:  -Discussed continued monitoring of right foot eschar vs bedside debridement. Patient amenable to bedside debridement. Attempted debridement with 10 mL 1% lidocaine plain, tolerated debridement of lateral 1/2-2/3 of eschar but could not tolerate debridement of medial portion. Underlying soft tissues viable, no concerns. May attempt debridement of medial portion next week if still inpatient or can attempt outpatient after discharge. OK for discharge from podiatry perspective.   -Antibiotic plan per ID. Feet are not clinically infected.   -Dressing changes by nursing.  -No WB restrictions from podiatry, WB in surgical shoes.   -Podiatry will follow.   DC Instructions: Please order ambulatory referral to podiatry (Short) for followup. WB in surgical shoes. HH/facility dressing changes for both feet q2 days: cleanse left foot blister with saline and paint with betadine; cleanse right foot wound and eschar with saline, apply hydrofera blue ready, wrap with cast padding and coban.

## 2023-04-05 NOTE — PROCEDURES
"Nelsy Marino is a 82 y.o. female patient.    Temp: 98.1 °F (36.7 °C) (04/05/23 0838)  Pulse: 77 (04/05/23 0838)  Resp: 18 (04/05/23 0838)  BP: 136/65 (04/05/23 0838)  SpO2: (!) 94 % (04/05/23 0838)  Weight: 67 kg (147 lb 11.3 oz) (04/04/23 0708)  Height: 5' 4" (162.6 cm) (04/04/23 0708)    PICC  Date/Time: 4/5/2023 11:00 AM  Performed by: Lorene Yu RN  Assisting provider: Nasrin Wall RN  Post-operative diagnosis: PICC line placement  Consent Done: Yes  Time out: Immediately prior to procedure a time out was called to verify the correct patient, procedure, equipment, support staff and site/side marked as required  Indications: med administration and vascular access  Anesthesia: local infiltration  Local anesthetic: lidocaine 1% without epinephrine  Anesthetic Total (mL): 2  Description of findings: PICC line placement  Preparation: skin prepped with ChloraPrep  Skin prep agent dried: skin prep agent completely dried prior to procedure  Sterile barriers: all five maximum sterile barriers used - cap, mask, sterile gown, sterile gloves, and large sterile sheet  Hand hygiene: hand hygiene performed prior to central venous catheter insertion  Location details: right basilic  Catheter type: double lumen  Catheter size: 5 Fr  Catheter Length: 34cm    Ultrasound guidance: yes  Vessel Caliber: medium and patent, compressibility normal  Needle advanced into vessel with real time Ultrasound guidance.  Guidewire confirmed in vessel.  Image recorded and saved.  Sterile sheath used.  Number of attempts: 1  Post-procedure: blood return through all ports, chlorhexidine patch and sterile dressing applied  Technical procedures used: 3CG  Specimens: No  Implants: No  Assessment: placement verified by x-ray  Complications: none        Name STAN Wall RN  4/5/2023    "

## 2023-04-05 NOTE — ASSESSMENT & PLAN NOTE
Nelsy Marino is a 82 y.o. woman with history of HTN, T2DM, DVT, and PAD who underwent left to right fem to SFA bypass and right SFA angioplasty on 3/2/23 and was admitted until 3/20. She was seen by Dr. Dunbar in the office earlier today complaining of left rest pain and also found to have dehiscence of her left groin incision.  S/p wound washout and flip with plastics 3/31    OR 4/4: LLE Angio    Meds:  -Hep Gtt  - Insulin SS  - Home meds  - Asa plavix statin  - Podiatry consulted for right TMA wound    ID Plan:  Recommendations / Plan:   Continue IV-micafungin 100mg Q24 (cxs +C.glabrata; +C.albicans)   Continue IV-Cefepime 2 g Q12h (renally dosed CrCl 34)  -- [Note: recent h/o R foot TMA wound dehiscence cx+ PSA (03/10/23), s/p cipro course for SSTI].   -- Podiatry following for TMA wound management.   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   -- ID will schedule pt for f/u appt in ID clinic in 2-3wks    Dispo:  DC when HH/ IV abx/ Picc line/ wound vac ready

## 2023-04-05 NOTE — SUBJECTIVE & OBJECTIVE
Medications:  Continuous Infusions:   heparin (porcine) in D5W 30 Units/kg/hr (04/05/23 0012)    lactated ringers 50 mL/hr at 04/04/23 1723     Scheduled Meds:   acetaminophen  650 mg Oral Q6H    amLODIPine  10 mg Oral Daily    ammonium lactate   Topical (Top) Daily    aspirin  81 mg Oral Daily    atorvastatin  40 mg Oral QHS    carvediloL  12.5 mg Oral BID    ceFEPime (MAXIPIME) IVPB  2 g Intravenous Q12H    clopidogreL  75 mg Oral Daily    DULoxetine  30 mg Oral BID    furosemide  20 mg Oral Daily    micafungin (MYCAMINE) IVPB  100 mg Intravenous Q24H    pantoprazole  40 mg Oral BID     PRN Meds:sodium chloride, sodium chloride, dextrose 10%, glucagon (human recombinant), heparin (PORCINE), heparin (PORCINE), insulin aspart U-100, ondansetron, oxyCODONE, sodium chloride 0.9%     Objective:     Vital Signs (Most Recent):  Temp: 98.2 °F (36.8 °C) (04/05/23 0536)  Pulse: 83 (04/05/23 0536)  Resp: 16 (04/05/23 0600)  BP: (!) 143/64 (04/05/23 0536)  SpO2: (!) 93 % (04/05/23 0536)   Vital Signs (24h Range):  Temp:  [97.4 °F (36.3 °C)-99.1 °F (37.3 °C)] 98.2 °F (36.8 °C)  Pulse:  [66-91] 83  Resp:  [16-22] 16  SpO2:  [84 %-100 %] 93 %  BP: (101-151)/(50-67) 143/64         Physical Exam  Constitutional:       General: She is not in acute distress.     Appearance: She is not ill-appearing.   HENT:      Head: Normocephalic.      Mouth/Throat:      Mouth: Mucous membranes are moist.   Cardiovascular:      Rate and Rhythm: Normal rate.      Comments:   Left: AT, PT triphasic signal    Pulmonary:      Effort: Pulmonary effort is normal. No respiratory distress.   Abdominal:      Palpations: Abdomen is soft.   Musculoskeletal:         General: Normal range of motion.   Skin:     General: Skin is warm and dry.      Capillary Refill: Capillary refill takes less than 2 seconds.      Comments: Left groin incision with wound vac in place. Good seal and on suction   Neurological:      General: No focal deficit present.      Mental  Status: She is alert.       Significant Labs:  CBC:   Recent Labs   Lab 04/05/23  0402   WBC 9.24  9.30   RBC 2.80*  2.91*   HGB 7.4*  7.6*   HCT 23.8*  25.1*     271   MCV 85  86   MCH 26.4*  26.1*   MCHC 31.1*  30.3*     CMP:   Recent Labs   Lab 04/05/23  0402   *   CALCIUM 8.4*   ALBUMIN 2.0*   PROT 6.3      K 3.9   CO2 23      BUN 24*   CREATININE 0.8   ALKPHOS 61   ALT 8*   AST 21   BILITOT 0.5       Significant Diagnostics:  I have reviewed all pertinent imaging results/findings within the past 24 hours.

## 2023-04-05 NOTE — SUBJECTIVE & OBJECTIVE
Subjective:     Interval History: Hypotensive, other vitals stable. No new pedal complaints. Dressings clean dry, intact.       Follow-up For: Procedure(s) (LRB):  Angiogram Extremity Unilateral - possible endovascular intervention (Right)    Post-Operative Day: 1 Day Post-Op    Scheduled Meds:   acetaminophen  650 mg Oral Q6H    amLODIPine  10 mg Oral Daily    ammonium lactate   Topical (Top) Daily    aspirin  81 mg Oral Daily    atorvastatin  40 mg Oral QHS    carvediloL  12.5 mg Oral BID    ceFEPime (MAXIPIME) IVPB  2 g Intravenous Q12H    clopidogreL  75 mg Oral Daily    DULoxetine  30 mg Oral BID    furosemide  20 mg Oral Daily    micafungin (MYCAMINE) IVPB  100 mg Intravenous Q24H    pantoprazole  40 mg Oral BID    sodium chloride 0.9%  10 mL Intravenous Q6H     Continuous Infusions:  PRN Meds:sodium chloride, sodium chloride, dextrose 10%, glucagon (human recombinant), insulin aspart U-100, ondansetron, oxyCODONE, sodium chloride 0.9%, Flushing PICC Protocol **AND** sodium chloride 0.9% **AND** sodium chloride 0.9%    Review of Systems   Constitutional:  Negative for chills, diaphoresis and fever.   Respiratory:  Negative for cough and shortness of breath.    Cardiovascular:  Negative for chest pain and leg swelling.   Gastrointestinal:  Negative for abdominal pain, diarrhea, nausea and vomiting.   Genitourinary: Negative.    Musculoskeletal:  Positive for gait problem.   Skin:  Positive for color change and wound.   Psychiatric/Behavioral:  Negative for agitation, confusion and decreased concentration. The patient is not nervous/anxious.    Objective:     Vital Signs (Most Recent):  Temp: 98 °F (36.7 °C) (04/05/23 1204)  Pulse: 85 (04/05/23 1204)  Resp: 18 (04/05/23 1255)  BP: (!) 117/49 (04/05/23 1204)  SpO2: 97 % (04/05/23 1204)   Vital Signs (24h Range):  Temp:  [97.5 °F (36.4 °C)-99.1 °F (37.3 °C)] 98 °F (36.7 °C)  Pulse:  [70-91] 85  Resp:  [16-20] 18  SpO2:  [91 %-100 %] 97 %  BP: (117-151)/(49-67)  117/49     Weight: 67 kg (147 lb 11.3 oz)  Body mass index is 25.35 kg/m².    Foot Exam    General  Orientation: alert and oriented to person, place, and time   Affect: appropriate       Right Foot/Ankle     Inspection and Palpation  Tenderness: (TMA site, mild)  Swelling: none   Skin Exam: abnormal color and ulcer (TMA incision site); no drainage (resolved) and no maceration (resolved)     Neurovascular  Dorsalis pedis: doppler  Posterior tibial: doppler  Saphenous nerve sensation: diminished  Tibial nerve sensation: diminished  Superficial peroneal nerve sensation: diminished  Deep peroneal nerve sensation: diminished  Sural nerve sensation: diminished    Comments  -TMA now with dry stable eschar intact medially and viable wound 2.0 x 7.0 x 1.0 cm down to fat laterally s/p bedside debridement. No drainage, no cellulitis, mild to moderate tenderness on palpation    Left Foot/Ankle      Inspection and Palpation  Tenderness: (rest pain LLE)  Swelling: none   Skin Exam: blister;     Neurovascular  Dorsalis pedis: absent  Posterior tibial: doppler  Saphenous nerve sensation: diminished  Tibial nerve sensation: diminished  Superficial peroneal nerve sensation: diminished  Deep peroneal nerve sensation: diminished  Sural nerve sensation: diminished    Comments  -Blister at dorsal foot, spontaneously ruptured with roof primarily intact, no concerns              Laboratory:  Blood Cultures: No results for input(s): LABBLOO in the last 48 hours.  CBC:   Recent Labs   Lab 04/05/23 0402   WBC 9.24  9.30   RBC 2.80*  2.91*   HGB 7.4*  7.6*   HCT 23.8*  25.1*     271   MCV 85  86   MCH 26.4*  26.1*   MCHC 31.1*  30.3*     CMP:   Recent Labs   Lab 04/05/23 0402   *   CALCIUM 8.4*   ALBUMIN 2.0*   PROT 6.3      K 3.9   CO2 23      BUN 24*   CREATININE 0.8   ALKPHOS 61   ALT 8*   AST 21   BILITOT 0.5     CRP:   Recent Labs   Lab 03/29/23 2008   CRP 38.0*     ESR:   Recent Labs   Lab  03/29/23  2008   SEDRATE 69*     Microbiology Results (last 7 days)       Procedure Component Value Units Date/Time    Aerobic culture [[id number]]  (Abnormal) Collected: 03/31/23 0809    Order Status: Completed Specimen: Wound from Groin Updated: 04/05/23 1246     Aerobic Bacterial Culture FELI GLABRATA  Rare  Susceptibility pending      Culture, Anaerobe [[id number]]  (Abnormal) Collected: 03/31/23 0809    Order Status: Completed Specimen: Wound from Groin Updated: 04/05/23 0518     Anaerobic Culture PREVOTELLA BUCCAE  Few      Culture, Anaerobe [[id number]] Collected: 03/31/23 0805    Order Status: Completed Specimen: Wound from Groin Updated: 04/04/23 1227     Anaerobic Culture No anaerobes isolated    Blood culture #1 **CANNOT BE ORDERED STAT** [[id number]] Collected: 03/29/23 2009    Order Status: Completed Specimen: Blood from Peripheral, Antecubital, Left Updated: 04/03/23 2312     Blood Culture, Routine No growth after 5 days.    Blood culture #2 **CANNOT BE ORDERED STAT** [[id number]] Collected: 03/29/23 2009    Order Status: Completed Specimen: Blood from Peripheral, Antecubital, Right Updated: 04/03/23 2312     Blood Culture, Routine No growth after 5 days.    AFB Culture & Smear [[id number]] Collected: 03/31/23 0805    Order Status: Completed Specimen: Wound from Groin Updated: 04/03/23 1624     AFB Culture & Smear Culture in progress     AFB CULTURE STAIN No acid fast bacilli seen.    AFB Culture & Smear [[id number]] Collected: 03/31/23 0809    Order Status: Completed Specimen: Wound from Groin Updated: 04/03/23 1624     AFB Culture & Smear Culture in progress     AFB CULTURE STAIN No acid fast bacilli seen.    Aerobic culture [[id number]]  (Abnormal) Collected: 03/31/23 0805    Order Status: Completed Specimen: Wound from Groin Updated: 04/03/23 1246     Aerobic Bacterial Culture FELI ALBICANS  Rare      Gram stain [[id number]] Collected: 03/31/23 0809    Order Status: Completed Specimen: Wound from  Groin Updated: 03/31/23 0929     Gram Stain Result No WBC's      No organisms seen    Narrative:      Left groin graft culture    Gram stain [[id number]] Collected: 03/31/23 0805    Order Status: Completed Specimen: Wound from Groin Updated: 03/31/23 0926     Gram Stain Result Rare WBC's      No organisms seen    Blood culture [[id number]]     Order Status: Canceled Specimen: Blood     Blood culture [[id number]]     Order Status: Canceled Specimen: Blood           Specimen (24h ago, onward)      None

## 2023-04-05 NOTE — PROGRESS NOTES
[facility]  Vascular Surgery  Progress Note    Patient Name: Nelsy Marino  MRN: [id number]  Admission Date: 3/29/2023  Primary Care Provider: Juan F Garay    Subjective:     Interval History: Patient did well overnight, no complaints this am.  VSS.   at bedside during rounds.     Post-Op Info:  Procedure(s) (LRB):  Angiogram Extremity Unilateral - possible endovascular intervention (Right)   1 Day Post-Op       Medications:  Continuous Infusions:   heparin (porcine) in D5W 30 Units/kg/hr (04/05/23 0012)    lactated ringers 50 mL/hr at 04/04/23 1723     Scheduled Meds:   acetaminophen  650 mg Oral Q6H    amLODIPine  10 mg Oral Daily    ammonium lactate   Topical (Top) Daily    aspirin  81 mg Oral Daily    atorvastatin  40 mg Oral QHS    carvediloL  12.5 mg Oral BID    ceFEPime (MAXIPIME) IVPB  2 g Intravenous Q12H    clopidogreL  75 mg Oral Daily    DULoxetine  30 mg Oral BID    furosemide  20 mg Oral Daily    micafungin (MYCAMINE) IVPB  100 mg Intravenous Q24H    pantoprazole  40 mg Oral BID     PRN Meds:sodium chloride, sodium chloride, dextrose 10%, glucagon (human recombinant), heparin (PORCINE), heparin (PORCINE), insulin aspart U-100, ondansetron, oxyCODONE, sodium chloride 0.9%     Objective:     Vital Signs (Most Recent):  Temp: 98.2 °F (36.8 °C) (04/05/23 0536)  Pulse: 83 (04/05/23 0536)  Resp: 16 (04/05/23 0600)  BP: (!) 143/64 (04/05/23 0536)  SpO2: (!) 93 % (04/05/23 0536)   Vital Signs (24h Range):  Temp:  [97.4 °F (36.3 °C)-99.1 °F (37.3 °C)] 98.2 °F (36.8 °C)  Pulse:  [66-91] 83  Resp:  [16-22] 16  SpO2:  [84 %-100 %] 93 %  BP: (101-151)/(50-67) 143/64         Physical Exam  Constitutional:       General: She is not in acute distress.     Appearance: She is not ill-appearing.   HENT:      Head: Normocephalic.      Mouth/Throat:      Mouth: Mucous membranes are moist.   Cardiovascular:      Rate and Rhythm: Normal rate.      Comments:   Left: AT, PT triphasic  signal    Pulmonary:      Effort: Pulmonary effort is normal. No respiratory distress.   Abdominal:      Palpations: Abdomen is soft.   Musculoskeletal:         General: Normal range of motion.   Skin:     General: Skin is warm and dry.      Capillary Refill: Capillary refill takes less than 2 seconds.      Comments: Left groin incision with wound vac in place. Good seal and on suction   Neurological:      General: No focal deficit present.      Mental Status: She is alert.       Significant Labs:  CBC:   Recent Labs   Lab 04/05/23  0402   WBC 9.24  9.30   RBC 2.80*  2.91*   HGB 7.4*  7.6*   HCT 23.8*  25.1*     271   MCV 85  86   MCH 26.4*  26.1*   MCHC 31.1*  30.3*     CMP:   Recent Labs   Lab 04/05/23  0402   *   CALCIUM 8.4*   ALBUMIN 2.0*   PROT 6.3      K 3.9   CO2 23      BUN 24*   CREATININE 0.8   ALKPHOS 61   ALT 8*   AST 21   BILITOT 0.5       Significant Diagnostics:  I have reviewed all pertinent imaging results/findings within the past 24 hours.    Assessment/Plan:     PVD (peripheral vascular disease)  Nelsy Marino is a 82 y.o. woman with history of HTN, T2DM, DVT, and PAD who underwent left to right fem to SFA bypass and right SFA angioplasty on 3/2/23 and was admitted until 3/20. She was seen by Dr. Dunbar in the office earlier today complaining of left rest pain and also found to have dehiscence of her left groin incision.  S/p wound washout and flip with plastics 3/31    OR 4/4: LLE Angio    Meds:  -Hep Gtt  - Insulin SS  - Home meds  - Asa plavix statin  - Podiatry consulted for right TMA wound    ID Plan:  Recommendations / Plan:   Continue IV-micafungin 100mg Q24 (cxs +C.glabrata; +C.albicans)   Continue IV-Cefepime 2 g Q12h (renally dosed CrCl 34)  -- [Note: recent h/o R foot TMA wound dehiscence cx+ PSA (03/10/23), s/p cipro course for SSTI].   -- Podiatry following for TMA wound management.    For tx of infection of native graft (w/ stent in place),  recommend 6wk duration s/p last vasc surgery (04/04); [GABE 05/16/23].   -- ID will schedule pt for f/u appt in ID clinic in 2-3wks    Dispo:  DC when HH/ IV abx/ Picc line/ wound vac ready        Samantha Ledesma NP  Vascular Surgery  [facility]

## 2023-04-05 NOTE — OP NOTE
Surgery Date: 4/4/2023      Surgeon(s) and Role:     * René Simms MD - Primary     * Ramana Navarro MD - Fellow     Assisting Surgeon: None     Pre-op Diagnosis:  PAD (peripheral artery disease) with rest pain     Post-op Diagnosis:  same     Procedure(s) (LRB):  Left lower extremity angiogram  PTA of SFA and CFA 6 x 60 mm shockwave balloon used for 5 cycles.   Anesthesia: Choice     Operative Findings: chronic disease in distal CFA. Mid to distal SFA also treated with good result. Reestablished good flow through CFA three vessel runoff to foot      Estimated Blood Loss: * No values recorded between 4/4/2023  9:33 AM and 4/4/2023 11:04 AM *     Estimated Blood Loss has been documented.         Specimens:   Specimen (24h ago, onward)        None                 Anesthesia: Local MAC    EBL: Minimal    Anesthesia: Local    Findings:   Successful revascularization / resolution of stenosis; DP and PT signal  Contrast used: 58ml  CO2: 0cc  Fluoro time: 19.6mins  Radiation: 740mGy    Complications:  None; patient tolerated the procedure well.    Disposition: Recoery- hemodynamically stable in good condition    Attending Attestation: I was present and scrubbed for the entire procedure.  After an informed consent was obtained the patient was brought to the interventional suite and placed in the supine position. Both the patient and procedure were confirmed and identified during timeout process. The patient received perioperative antibiotics. The patient's bilateral groins were prepped and draped in usual sterile fashion. Using ultrasound guidance, the Fem to fem graft. Then direct ultrasound guidance the fem to fem bypass was entered with a 21-G needle, Mandril wire and 4-Fr micropuncture needle. Then under fluoroscopic guidance, an 0.035-in wire was placed into the distal portion of the fem to fem bypass. The micropuncture dilator was then exchanged over the wire for a 6-Bengali sheath. Sheathogram demonstrated access  in the fem to fem bypass. Next a sohan-catheter was placed over the wire and into the distal fem to fem bypass under fluoroscopy and an left leg was performed by selecting the left common femoral arterywhich demonstrated:  Aorto-iliac vessels: patent  Left Common femoral, profunda femoral arteries: distal CFA occlusion  Left Superficial femoral artery: two areas of stenosis high grade  Left Popliteal artery: patent  Tibials: three vessel runoff to the foot  Based on the images from this diagnostic angio, a decision was made to intervene. We then systemically heparinized the patient with 5000u of heparin.   Next, we placed a up-and-over sheath and used a 0.35-inch Glidewire Advantage hybdrid wire to selected the SF artery. Treatment of SF artery was done with 6 x 60 mm shockwave balloon for multiple cycles. Next the CFA was treated with the same shockwave balloon for multiple cycles. A follow-up angiogram showed resolution of the lesions. Heparin was reversed with protamine. We then held manual pressure for 30 minutes. At the conclusion of the case the patient was noted to have no evidence of a groin hematoma. All instrument and sponge counts were correct at the end of the case. The patient tolerated the procedure well and was transferred to the pacu for further recovery.     Ramana Navarro MD PGY7  Vascular Surgery Fellow  [phone]

## 2023-04-05 NOTE — PLAN OF CARE
[facility]    Met with patient, , and patient's sister. Introduced infusion services and what to expect while on IV antibiotics at home. SASH mat and elastomeric pump (med ball). Sister inquired about home health coming out everyday to administer. Informed her that home health comes once a week for picc line dressing changes and weekly labs. Unsure how often they will come for wound care. Educated on SASH mat steps.  thinks this is something he can do after multiple education sessions. Wife and sister wish to look into placement. Will send  video on administration steps. Will return again tomorrow to have  flush picc line and assess his comfort level and return demonstration. Informed Edwige VALDEZ. Will cont to follow.     [facility]  Jose M Gold Rn, Clinical Educator  Cell [phone]  Office [phone]  Fax [phone]

## 2023-04-05 NOTE — PROGRESS NOTES
Plastic and Reconstructive Surgery   Progress Note    Subjective:      Patient seen and examined at bedside. No acute event overnight. Pain controlled and tolerating diet. Wound vac in place with good seal. Patient have to wait 2 weeks from surgery before full walking with knee bend. She can get up and use restroom or walk short distance with a stiff straight leg. Knee immobilzer would help ensure this      Objective:  Vital signs in last 24 hours:  Temp:  [97.8 °F (36.6 °C)-99.1 °F (37.3 °C)] 98 °F (36.7 °C)  Pulse:  [76-91] 76  Resp:  [16-20] 18  SpO2:  [91 %-97 %] 95 %  BP: (117-143)/(49-65) 128/61    Intake/Output last 3 shifts:  I/O last 3 completed shifts:  In: 1250 [Blood:350; IV Piggyback:900]  Out: 535 [Urine:300; Drains:15; Other:220]    Intake/Output this shift:  No intake/output data recorded.        Physical Exam:  VITAL SIGNS:   Vitals:    23 1703 23 1919 23 2300   BP:  (!) 122/58  128/61   BP Location:       Patient Position:       Pulse: 82 81  76   Resp: 20 16 16 18   Temp: 97.8 °F (36.6 °C) 98.4 °F (36.9 °C)  98 °F (36.7 °C)   TempSrc: Oral      SpO2: 96% (!) 94%  95%   Weight:       Height:         TMAX: Temp (24hrs), Av.2 °F (36.8 °C), Min:97.8 °F (36.6 °C), Max:99.1 °F (37.3 °C)    General: Alert; No acute distress  Cardiovascular: Regular rate   Respiratory: Normal respiratory effort. Chest rise symmetric.   Abdomen: Soft, nontender, nondistended  Extremity: left groin/thigh with wound vac in place  Neurologic: No focal deficit. Speech normal      Scheduled Medications acetaminophen, 650 mg, Q6H  amLODIPine, 10 mg, Daily  ammonium lactate, , Daily  aspirin, 81 mg, Daily  atorvastatin, 40 mg, QHS  carvediloL, 12.5 mg, BID  ceFEPime (MAXIPIME) IVPB, 2 g, Q12H  clopidogreL, 75 mg, Daily  DULoxetine, 30 mg, BID  furosemide, 20 mg, Daily  micafungin (MYCAMINE) IVPB, 100 mg, Q24H  pantoprazole, 40 mg, BID  sodium chloride 0.9%, 10 mL, Q6H      PRN Medications  sodium chloride, sodium chloride, dextrose 10%, glucagon (human recombinant), insulin aspart U-100, ondansetron, oxyCODONE, sodium chloride 0.9%, Flushing PICC Protocol **AND** sodium chloride 0.9% **AND** sodium chloride 0.9%    Recent Labs:   Lab Results   Component Value Date    WBC 9.30 04/05/2023    WBC 9.24 04/05/2023    HGB 7.6 (L) 04/05/2023    HGB 7.4 (L) 04/05/2023    HCT 25.1 (L) 04/05/2023    HCT 23.8 (L) 04/05/2023    MCV 86 04/05/2023    MCV 85 04/05/2023     04/05/2023     04/05/2023     Lab Results   Component Value Date     (H) 04/05/2023     04/05/2023    K 3.9 04/05/2023     04/05/2023    BUN 24 (H) 04/05/2023         Assessment: 82 y.o. y/o female 2 Days Post-Op s/p Procedure(s):  DEBRIDEMENT, WOUND  CREATION, FLAP, MUSCLE ROTATION Doing well postoperatively.    Plan  -Diabetic diet  - abx  -Pain control  -ASA/plavix/hep drip  -Wound vac to stay on at all times for 7 days, will remove 4/7  -Patient have to wait 2 weeks from surgery before full walking with knee bend. She can get up and use restroom or walk short distance with a stiff straight leg. Knee immobilzer would help ensure this  -F/u vascular recs  -Will plan for skin graft pending graft eval 4/7  -Medical management as per primary  -Discussed with Dr. Sherice Ireland DO- Fellow  Department of Plastic and Reconstructive Surgery  [phone] (office)

## 2023-04-05 NOTE — PROCEDURES
"Nelsy Marino is a 82 y.o. female patient.    Temp: 98 °F (36.7 °C) (04/05/23 1204)  Pulse: 85 (04/05/23 1204)  Resp: 18 (04/05/23 1255)  BP: (!) 117/49 (04/05/23 1204)  SpO2: 97 % (04/05/23 1204)  Weight: 67 kg (147 lb 11.3 oz) (04/04/23 0708)  Height: 5' 4" (162.6 cm) (04/04/23 0708)       Debridement    Date/Time: 4/5/2023 12:30 PM  Performed by: Lalo Pollock MD  Authorized by: Liv Polanco DPM     Time out: Immediately prior to procedure a "time out" was called to verify the correct patient, procedure, equipment, support staff and site/side marked as required.    Consent Done?:  Yes (Verbal)  Local anesthesia used?: Yes    Anesthesia:  Local infiltration  Local anesthetic:  Lidocaine 1% without epinephrine  Anesthetic total (ml):  10    Wound Details:    Location:  Right foot    Type of Debridement:  Excisional       Length (cm):  2       Area (sq cm):  14       Width (cm):  7       Percent Debrided (%):  90       Depth (cm):  1       Total Area Debrided (sq cm):  12.6    Depth of debridement:  Subcutaneous tissue    Tissue debrided:  Adipose, Dermis, Epidermis and Subcutaneous    Devitalized tissue debrided:  Necrotic/Eschar    Instruments:  Blade, Forceps and Scissors  Bleeding:  Minimal  Hemostasis Achieved: Yes  Method Used:  Pressure  Patient tolerance:  Patient tolerated the procedure well with no immediate complications    4/5/2023                "

## 2023-04-05 NOTE — PROGRESS NOTES
[facility]  Podiatry  Progress Note    Patient Name: Nelsy Marino  MRN: [id number]  Admission Date: 3/29/2023  Hospital Length of Stay: 7 days  Attending Physician: Danny Dunbar MD  Primary Care Provider: [facility]     Subjective:     Interval History: Hypotensive, other vitals stable. No new pedal complaints. Dressings clean dry, intact.       Follow-up For: Procedure(s) (LRB):  Angiogram Extremity Unilateral - possible endovascular intervention (Right)    Post-Operative Day: 1 Day Post-Op    Scheduled Meds:   acetaminophen  650 mg Oral Q6H    amLODIPine  10 mg Oral Daily    ammonium lactate   Topical (Top) Daily    aspirin  81 mg Oral Daily    atorvastatin  40 mg Oral QHS    carvediloL  12.5 mg Oral BID    ceFEPime (MAXIPIME) IVPB  2 g Intravenous Q12H    clopidogreL  75 mg Oral Daily    DULoxetine  30 mg Oral BID    furosemide  20 mg Oral Daily    micafungin (MYCAMINE) IVPB  100 mg Intravenous Q24H    pantoprazole  40 mg Oral BID    sodium chloride 0.9%  10 mL Intravenous Q6H     Continuous Infusions:  PRN Meds:sodium chloride, sodium chloride, dextrose 10%, glucagon (human recombinant), insulin aspart U-100, ondansetron, oxyCODONE, sodium chloride 0.9%, Flushing PICC Protocol **AND** sodium chloride 0.9% **AND** sodium chloride 0.9%    Review of Systems   Constitutional:  Negative for chills, diaphoresis and fever.   Respiratory:  Negative for cough and shortness of breath.    Cardiovascular:  Negative for chest pain and leg swelling.   Gastrointestinal:  Negative for abdominal pain, diarrhea, nausea and vomiting.   Genitourinary: Negative.    Musculoskeletal:  Positive for gait problem.   Skin:  Positive for color change and wound.   Psychiatric/Behavioral:  Negative for agitation, confusion and decreased concentration. The patient is not nervous/anxious.    Objective:     Vital Signs (Most Recent):  Temp: 98 °F (36.7 °C) (04/05/23 1204)  Pulse: 85 (04/05/23 1204)  Resp: 18  (04/05/23 1255)  BP: (!) 117/49 (04/05/23 1204)  SpO2: 97 % (04/05/23 1204)   Vital Signs (24h Range):  Temp:  [97.5 °F (36.4 °C)-99.1 °F (37.3 °C)] 98 °F (36.7 °C)  Pulse:  [70-91] 85  Resp:  [16-20] 18  SpO2:  [91 %-100 %] 97 %  BP: (117-151)/(49-67) 117/49     Weight: 67 kg (147 lb 11.3 oz)  Body mass index is 25.35 kg/m².    Foot Exam    General  Orientation: alert and oriented to person, place, and time   Affect: appropriate       Right Foot/Ankle     Inspection and Palpation  Tenderness: (TMA site, mild)  Swelling: none   Skin Exam: abnormal color and ulcer (TMA incision site); no drainage (resolved) and no maceration (resolved)     Neurovascular  Dorsalis pedis: doppler  Posterior tibial: doppler  Saphenous nerve sensation: diminished  Tibial nerve sensation: diminished  Superficial peroneal nerve sensation: diminished  Deep peroneal nerve sensation: diminished  Sural nerve sensation: diminished    Comments  -TMA now with dry stable eschar intact medially and viable wound 2.0 x 7.0 x 1.0 cm down to fat laterally s/p bedside debridement. No drainage, no cellulitis, mild to moderate tenderness on palpation    Left Foot/Ankle      Inspection and Palpation  Tenderness: (rest pain LLE)  Swelling: none   Skin Exam: blister;     Neurovascular  Dorsalis pedis: absent  Posterior tibial: doppler  Saphenous nerve sensation: diminished  Tibial nerve sensation: diminished  Superficial peroneal nerve sensation: diminished  Deep peroneal nerve sensation: diminished  Sural nerve sensation: diminished    Comments  -Blister at dorsal foot, spontaneously ruptured with roof primarily intact, no concerns              Laboratory:  Blood Cultures: No results for input(s): LABBLOO in the last 48 hours.  CBC:   Recent Labs   Lab 04/05/23 0402   WBC 9.24  9.30   RBC 2.80*  2.91*   HGB 7.4*  7.6*   HCT 23.8*  25.1*     271   MCV 85  86   MCH 26.4*  26.1*   MCHC 31.1*  30.3*     CMP:   Recent Labs   Lab 04/05/23 0402    *   CALCIUM 8.4*   ALBUMIN 2.0*   PROT 6.3      K 3.9   CO2 23      BUN 24*   CREATININE 0.8   ALKPHOS 61   ALT 8*   AST 21   BILITOT 0.5     CRP:   Recent Labs   Lab 03/29/23 2008   CRP 38.0*     ESR:   Recent Labs   Lab 03/29/23 2008   SEDRATE 69*     Microbiology Results (last 7 days)       Procedure Component Value Units Date/Time    Aerobic culture [[id number]]  (Abnormal) Collected: 03/31/23 0809    Order Status: Completed Specimen: Wound from Groin Updated: 04/05/23 1246     Aerobic Bacterial Culture FELI GLABRATA  Rare  Susceptibility pending      Culture, Anaerobe [[id number]]  (Abnormal) Collected: 03/31/23 0809    Order Status: Completed Specimen: Wound from Groin Updated: 04/05/23 0518     Anaerobic Culture PREVOTELLA BUCCAE  Few      Culture, Anaerobe [[id number]] Collected: 03/31/23 0805    Order Status: Completed Specimen: Wound from Groin Updated: 04/04/23 1227     Anaerobic Culture No anaerobes isolated    Blood culture #1 **CANNOT BE ORDERED STAT** [[id number]] Collected: 03/29/23 2009    Order Status: Completed Specimen: Blood from Peripheral, Antecubital, Left Updated: 04/03/23 2312     Blood Culture, Routine No growth after 5 days.    Blood culture #2 **CANNOT BE ORDERED STAT** [[id number]] Collected: 03/29/23 2009    Order Status: Completed Specimen: Blood from Peripheral, Antecubital, Right Updated: 04/03/23 2312     Blood Culture, Routine No growth after 5 days.    AFB Culture & Smear [[id number]] Collected: 03/31/23 0805    Order Status: Completed Specimen: Wound from Groin Updated: 04/03/23 1624     AFB Culture & Smear Culture in progress     AFB CULTURE STAIN No acid fast bacilli seen.    AFB Culture & Smear [[id number]] Collected: 03/31/23 0809    Order Status: Completed Specimen: Wound from Groin Updated: 04/03/23 1624     AFB Culture & Smear Culture in progress     AFB CULTURE STAIN No acid fast bacilli seen.    Aerobic culture [[id number]]  (Abnormal) Collected:  03/31/23 0805    Order Status: Completed Specimen: Wound from Groin Updated: 04/03/23 1246     Aerobic Bacterial Culture FELI ALBICANS  Rare      Gram stain [[id number]] Collected: 03/31/23 0809    Order Status: Completed Specimen: Wound from Groin Updated: 03/31/23 0929     Gram Stain Result No WBC's      No organisms seen    Narrative:      Left groin graft culture    Gram stain [[id number]] Collected: 03/31/23 0805    Order Status: Completed Specimen: Wound from Groin Updated: 03/31/23 0926     Gram Stain Result Rare WBC's      No organisms seen    Blood culture [[id number]]     Order Status: Canceled Specimen: Blood     Blood culture [[id number]]     Order Status: Canceled Specimen: Blood           Specimen (24h ago, onward)      None                Assessment/Plan:     Derm  Eschar of foot  Plan:  -Discussed continued monitoring of right foot eschar vs bedside debridement. Patient amenable to bedside debridement. Attempted debridement with 10 mL 1% lidocaine plain, tolerated debridement of lateral 1/2-2/3 of eschar but could not tolerate debridement of medial portion. Underlying soft tissues viable, no concerns. May attempt debridement of medial portion next week if still inpatient or can attempt outpatient after discharge. OK for discharge from podiatry perspective.   -Antibiotic plan per ID. Feet are not clinically infected.   -Dressing changes by nursing.  -No WB restrictions from podiatry, WB in surgical shoes.   -Podiatry will follow.   DC Instructions: Please order ambulatory referral to podiatry (Short) for followup. WB in surgical shoes. HH/facility dressing changes for both feet q2 days: cleanse left foot blister with saline and paint with betadine; cleanse right foot wound and eschar with saline, apply hydrofera blue ready, wrap with cast padding and coban.     Cardiac/Vascular  PVD (peripheral vascular disease)  See rest of plan      Lalo Pollock DPM PGY-3  Podiatric Medicine & Surgery  [facility]  [facility]  Secure Chat Preferred  Mobile: [phone]  Pager: [phone]

## 2023-04-05 NOTE — CONSULTS
HILARIO placed double lumen PICC Right basilic vein for 6 weeks of abx--cefepime and micafungin, estimated end date 5/16/23, 34 cm in length and 0 cm exposed.  Arm circumference 30 cm lot #[id number].

## 2023-04-05 NOTE — PT/OT/SLP EVAL
Physical Therapy Evaluation    Patient Name:  Nelsy Marino   MRN:  [id number]    Recommendations:     Discharge Recommendations: nursing facility, skilled   Discharge Equipment Recommendations:  (TBD pending pt progress)   Barriers to discharge: Inaccessible home and Decreased caregiver support    Assessment:     Nelsy Marino is a 82 y.o. female admitted with a medical diagnosis of Wound dehiscence.  She presents with the following impairments/functional limitations: weakness, impaired endurance, impaired sensation, impaired self care skills, impaired functional mobility, gait instability, impaired balance, decreased lower extremity function, decreased safety awareness, orthopedic precautions, impaired skin. Pt would benefit from a skilled nursing facility for: Dynamic/static standing/sitting balance through skilled balance training, strengthening with the use of skilled therapeutic exercises interventions, and mobility through adaptive equipment training. Pt continues to benefit from a collaborative PT/OT program to improve quality of life and focus on recovery of impairments.      Rehab Prognosis: Good; patient would benefit from acute skilled PT services to address these deficits and reach maximum level of function.    Recent Surgery: Procedure(s) (LRB):  Angiogram Extremity Unilateral - possible endovascular intervention (Right) 1 Day Post-Op    Plan:     During this hospitalization, patient to be seen 3 x/week to address the identified rehab impairments via gait training, therapeutic activities, therapeutic exercises, neuromuscular re-education and progress toward the following goals:    Plan of Care Expires:  05/05/23    Subjective     Chief Complaint: pain from wound cleaning  Patient/Family Comments/goals: none verbalized  Pain/Comfort:  Pain Rating 1: 10/10  Location 1:  (not specified)  Pain Addressed 1: Reposition, Distraction  Pain Rating Post-Intervention 1: other (see comments) (not  "rated)    Patients cultural, spiritual, Restorationist conflicts given the current situation: no    Living Environment:  Patient lives with  in a single family home with 1 LUMA.   Prior Level of Function:   Patient reports being independent with mobility using a Rolling Walker & with ADLs. Patient uses DME as follows: bath bench, bedside commode, walker, rolling. DME owned (not currently used): none.     Patient reports they will have assistance from , sisters upon discharge.    Objective:     Communicated with RN prior to session.  Patient found supine with wound vac, telemetry, pulse ox (continuous), PICC line, peripheral IV, PureWick, WILTON drain  upon PT entry to room.    General Precautions: Standard, fall  Orthopedic Precautions: ("Can mobilize short distances with straight leg")   Braces: N/A  Respiratory Status: Room air    Exams:  Cognitive Exam:  Patient is awake and alert  Gross Motor Coordination:  WFL  Postural Exam:  Patient presented with the following abnormalities:    -       Rounded shoulders  Sensation:  denies numbness/tingling or any sensory deficits  RLE ROM: WFL  RLE Strength: Deficits: hip flexion 2/5, knee extension 4/5, knee flexion 4/5, DF 2/5  LLE ROM: WFL  LLE Strength: Deficits: hip flexion 1/5, knee extension 2/5, knee flexion 4/5, DF 0/5    Functional Mobility:  Bed Mobility:     Scooting: minimum assistance  Supine to Sit: stand by assistance  Sit to Supine: minimum assistance  Transfers:  pt declined stating she didn't feel up to it today   Balance:   Static Sitting: SBA  Dynamic Sitting: SBA-CGA        AM-PAC 6 CLICK MOBILITY  Total Score:12       Treatment & Education:  Patient educated on role of therapy, goals of session, and benefits of mobilizing.   Discussed PT plan of care during hospitalization.   Patient educated on calling for assistance.   Patient educated on how their diagnosis impacts their mobility within PT scope of practice.   All questions answered within PT " scope of practice.    Patient left supine with all lines intact, call button in reach, and pt's family member present.    GOALS:   Multidisciplinary Problems       Physical Therapy Goals          Problem: Physical Therapy    Goal Priority Disciplines Outcome Goal Variances Interventions   Physical Therapy Goal     PT, PT/OT Ongoing, Progressing     Description: Goals to be met by: 2023     Patient will increase functional independence with mobility by performin. Supine to sit with [place]  2. Sit to supine with [place]  3. Sit to stand transfer with [place] using RW  4. Bed to chair transfer with [place] using Rolling Walker  5. Gait  x 10 feet with Minimal Assistance using Rolling Walker.   6. Lower extremity exercise program x15 reps per handout, with supervision                         History:     Past Medical History:   Diagnosis Date    CHF (congestive heart failure)     Diabetes mellitus     DVT (deep venous thrombosis)     Hypertension     Miscarriage        Past Surgical History:   Procedure Laterality Date     femoral vascular surgery       ANGIOGRAPHY OF LOWER EXTREMITY Right 2023    Procedure: Angiogram Extremity Unilateral;  Surgeon: Danny Dunbar MD;  Location: [facility] OR;  Service: Vascular;  Laterality: Right;  RN PHONE PREOP 2023----CK CONSENT    ANGIOGRAPHY OF LOWER EXTREMITY Right 2023    Procedure: Angiogram Extremity Unilateral - possible endovascular intervention;  Surgeon: René Simms MD;  Location: [facility] OR [address] [place] [address];  Service: Vascular;  Laterality: Right;  19.6 mins.  740.81 mGy  73.3399 Gy.cm  58ml dye  local - 1ml    CREATION OF FEMORAL-FEMORAL ARTERY BYPASS WITH GRAFT Bilateral 3/2/2023    Procedure: CREATION, BYPASS, ARTERIAL, FEMORAL TO FEMORAL, USING GRAFT, LEFT FEMORAL ENDARTERECTOMY, LEFT ILIAC ARTERY STENT PLACEMENT, RIGHT LOWER EXTREMITY ANGIOGRAM AND RIGHT SFA  ANGIOPLASTY;  Surgeon: Danny Dunbar MD;  Location: [facility] OR [address] [place] [address];  Service:  Vascular;  Laterality: Bilateral;   744.47 MGY  175.36 Gycm  Flouro time 20.5 mins      CREATION OF ILIOFEMORAL ARTERY BYPASS Right 3/20/2019    Procedure: CREATION, BYPASS, ARTERIAL, ILIAC TO FEMORAL, RIGHT LOWER EXTREMITY, RIGHT PROFUNDAPLASTY;  Surgeon: Danny Dunbar MD;  Location: [facility];  Service: Vascular;  Laterality: Right;  11:00AM START PER ANNE  RN PREOP 3/13/2019  ACT'S, CELL SAVER, GRAFTS, BOOK WATER---NEED H/P  CONSENT IN AM------HGBA1C    CREATION OF MUSCLE ROTATIONAL FLAP Left 3/31/2023    Procedure: CREATION, FLAP, MUSCLE ROTATION;  Surgeon: Brandon Smiley MD;  Location: [facility] OR [address] [place] [address];  Service: Plastics;  Laterality: Left;    ESOPHAGOGASTRODUODENOSCOPY N/A 3/17/2023    Procedure: EGD (ESOPHAGOGASTRODUODENOSCOPY);  Surgeon: Gee Rodriguez MD;  Location: [facility] ([address] [place] [address]);  Service: Endoscopy;  Laterality: N/A;    FOOT AMPUTATION THROUGH METATARSAL Right 3/4/2023    Procedure: AMPUTATION, FOOT, TRANSMETATARSAL;  Surgeon: Benjamin Godfrey DPM;  Location: [address];  Service: Podiatry;  Laterality: Right;    HYSTERECTOMY      ?unsure cervix present    INCISION AND DRAINAGE Right 4/12/2019    Procedure: Incision and Drainage - R groin washout, possible muscle flap;  Surgeon: Danny Dunbar MD;  Location: [address];  Service: Vascular;  Laterality: Right;  groin washout    INCISION AND DRAINAGE OF GROIN Right 5/3/2019    Procedure: Incision and Drainage R groin;  Surgeon: Danny Dunbar MD;  Location: [facility] OR [address] [place] [address];  Service: Vascular;  Laterality: Right;    WOUND DEBRIDEMENT Left 3/31/2023    Procedure: DEBRIDEMENT, WOUND;  Surgeon: René Simms MD;  [address];  Service: Vascular;  Laterality: Left;       Time Tracking:     PT Received On: 04/05/23  PT Start Time: 1416     PT Stop Time: 1426  PT Total Time (min): 10 min     Billable Minutes: Evaluation 10      04/05/2023

## 2023-04-05 NOTE — PLAN OF CARE
[facility] - [facility]      HOME HEALTH ORDERS  FACE TO FACE ENCOUNTER    Patient Name: Nelsy Marino  YOB: 1940    PCP: Juan F Garay   PCP Address: [address] / [address]  PCP Phone Number: [phone]  PCP Fax: [phone]    Encounter Date: 3/29/23    Admit to Home Health    Diagnoses:  Active Hospital Problems    Diagnosis  POA    *Wound dehiscence [T81.30XA]  Yes    Eschar of foot [R23.4]  Yes    PVD (peripheral vascular disease) [I73.9]  Yes     Chronic      Resolved Hospital Problems   No resolved problems to display.       Follow Up Appointments:  Future Appointments   Date Time Provider Department Center   4/14/2023  1:30 PM Nain Hopper MD [facility]   5/17/2023  1:30 PM Nain Hopper MD [facility]       Allergies:  Review of patient's allergies indicates:   Allergen Reactions    Motrin [ibuprofen] Itching       Medications: Review discharge medications with patient and family and provide education.    Current Facility-Administered Medications   Medication Dose Route Frequency Provider Last Rate Last Admin    0.9%  NaCl infusion (for blood administration)   Intravenous Q24H PRN Ramana Navarro MD        0.9%  NaCl infusion (for blood administration)   Intravenous Q24H PRN Ramana Navarro MD        acetaminophen tablet 650 mg  650 mg Oral Q6H Ramana Navarro MD   650 mg at 04/05/23 1216    amLODIPine tablet 10 mg  10 mg Oral Daily Ramana Navarro MD   10 mg at 04/05/23 0939    ammonium lactate 12 % lotion   Topical (Top) Daily Ramana Navarro MD   Given at 04/05/23 0848    aspirin EC tablet 81 mg  81 mg Oral Daily Ramana Navarro MD   81 mg at 04/05/23 0939    atorvastatin tablet 40 mg  40 mg Oral QHS Ramana Navarro MD   40 mg at 04/04/23 2033    carvediloL tablet 12.5 mg  12.5 mg Oral BID Ramana Navarro MD   12.5 mg at 04/05/23 0939    ceFEPIme (MAXIPIME) 2 g in dextrose 5 % in water (D5W) 5 % 50 mL IVPB (MB+)  2 g Intravenous Q12H Ramana Navarro MD   Stopped at 04/05/23  0316    clopidogreL tablet 75 mg  75 mg Oral Daily Ramana Navraro MD   75 mg at 04/05/23 0939    dextrose 10% bolus 125 mL 125 mL  12.5 g Intravenous PRN Ramana Navarro MD        DULoxetine DR capsule 30 mg  30 mg Oral BID Ramana Navarro MD   30 mg at 04/05/23 0939    furosemide tablet 20 mg  20 mg Oral Daily Ramana Navarro MD   20 mg at 04/05/23 0939    glucagon (human recombinant) injection 1 mg  1 mg Intramuscular PRN Ramana Navarro MD        heparin 25,000 units in dextrose 5% (100 units/ml) IV bolus from bag - ADDITIONAL PRN BOLUS - 30 units/kg  30 Units/kg (Adjusted) Intravenous PRN Teresa Conley MD        heparin 25,000 units in dextrose 5% (100 units/ml) IV bolus from bag - ADDITIONAL PRN BOLUS - 60 units/kg  60 Units/kg (Adjusted) Intravenous PRN Teresa Conley MD        insulin aspart U-100 pen 1-10 Units  1-10 Units Subcutaneous Q6H PRN Ramana Navarro MD   4 Units at 04/05/23 0600    micafungin 100 mg in sodium chloride 0.9 % 100 mL IVPB (MB+)  100 mg Intravenous Q24H Ramana Navarro MD   Stopped at 04/04/23 2132    ondansetron injection 4 mg  4 mg Intravenous Q8H PRN Ramana Navarro MD        oxyCODONE immediate release tablet 5 mg  5 mg Oral Q4H PRN Ramana Navarro MD   5 mg at 04/05/23 0600    pantoprazole EC tablet 40 mg  40 mg Oral BID Ramana Navarro MD   40 mg at 04/05/23 0939    sodium chloride 0.9% flush 10 mL  10 mL Intravenous PRN Ramana Navarro MD        sodium chloride 0.9% flush 10 mL  10 mL Intravenous Q6H Danny Dunbar MD   10 mL at 04/05/23 1200    And    sodium chloride 0.9% flush 10 mL  10 mL Intravenous PRN Danny Dunbar MD         Current Discharge Medication List        CONTINUE these medications which have NOT CHANGED    Details   carvediloL (COREG) 12.5 MG tablet Take 12.5 mg by mouth 2 (two) times daily.      amlodipine (NORVASC) 10 MG tablet Take 10 mg by mouth once daily.      apixaban (ELIQUIS) 5 mg Tab Take 1 tablet (5 mg total) by mouth 2 (two) times daily.  Qty: 60 tablet,  "Refills: 0      aspirin (ECOTRIN) 81 MG EC tablet Take 81 mg by mouth once daily.      atorvastatin (LIPITOR) 40 MG tablet Take 1 tablet (40 mg total) by mouth every evening.  Qty: 30 tablet, Refills: 0      BLOOD SUGAR DIAGNOSTIC (TRUE METRIX GLUCOSE TEST STRIP MISC) by Misc.(Non-Drug; Combo Route) route.      cephALEXin (KEFLEX) 500 MG capsule Take 2 capsules (1,000 mg total) by mouth every 12 (twelve) hours.  Qty: 60 capsule, Refills: 0      clopidogreL (PLAVIX) 75 mg tablet Take 1 tablet (75 mg total) by mouth once daily.  Qty: 30 tablet, Refills: 0      DULoxetine (CYMBALTA) 30 MG capsule Take 1 capsule (30 mg total) by mouth 2 (two) times daily.  Qty: 60 capsule, Refills: 0      furosemide (LASIX) 20 MG tablet Take 1 tablet (20 mg total) by mouth once daily.  Qty: 30 tablet, Refills: 0      insulin aspart protamine-insulin aspart (NOVOLOG MIX 70-30FLEXPEN U-100) 100 unit/mL (70-30) InPn pen Inject 12 Units into the skin 2 (two) times daily before meals.  Refills: 0      insulin syringe-needle,dispos. 0.3 mL 30 gauge x 5/16" Syrg by Misc.(Non-Drug; Combo Route) route.      losartan-hydrochlorothiazide 100-25 mg (HYZAAR) 100-25 mg per tablet Take 1 tablet by mouth once daily.      metformin (GLUCOPHAGE) 1000 MG tablet Take 1,000 mg by mouth 2 (two) times daily with meals.      ondansetron (ZOFRAN) 8 MG tablet Refills: 0      pantoprazole (PROTONIX) 40 MG tablet Take 1 tablet (40 mg total) by mouth 2 (two) times daily.  Qty: 60 tablet, Refills: 1      pen needle, diabetic 32 gauge x 5/32" Ndle Inject 1 each into the skin 4 (four) times daily.  Qty: 100 each, Refills: 0           STOP taking these medications       benzonatate (TESSALON) 100 MG capsule Comments:   Reason for Stopping:                 I have seen and examined this patient within the last 30 days. My clinical findings that support the need for the home health skilled services and home bound status are the following:no   Weakness/numbness causing " balance and gait disturbance due to Surgery making it taxing to leave home.     Diet:   cardiac diet    Labs:      Referrals/ Consults  Physical Therapy to evaluate and treat. Evaluate for home safety and equipment needs; Establish/upgrade home exercise program. Perform / instruct on therapeutic exercises, gait training, transfer training, and Range of Motion.  Occupational Therapy to evaluate and treat. Evaluate home environment for safety and equipment needs. Perform/Instruct on transfers, ADL training, ROM, and therapeutic exercises.    Activities:   activity as tolerated    Nursing:   Agency to admit patient within 24 hours of hospital discharge unless specified on physician order or at patient request    SN to complete comprehensive assessment including routine vital signs. Instruct on disease process and s/s of complications to report to MD. Review/verify medication list sent home with the patient at time of discharge  and instruct patient/caregiver as needed. Frequency may be adjusted depending on start of care date.     Skilled nurse to perform up to 3 visits PRN for symptoms related to diagnosis    Notify MD if SBP > 160 or < 90; DBP > 90 or < 50; HR > 120 or < 50; Temp > 101; O2 < 88%; Other:       Ok to schedule additional visits based on staff availability and patient request on consecutive days within the home health episode.    When multiple disciplines ordered:    Start of Care occurs on Sunday - Wednesday schedule remaining discipline evaluations as ordered on separate consecutive days following the start of care.    Thursday SOC -schedule subsequent evaluations Friday and Monday the following week.     Friday - Saturday SOC - schedule subsequent discipline evaluations on consecutive days starting Monday of the following week.    For all post-discharge communication and subsequent orders please contact patient's primary care physician. If unable to reach primary care physician or do not receive  response within 30 minutes, please contact [phone] for clinical staff order clarification    Miscellaneous   Home Infusion Therapy:   SN to perform Infusion Therapy/Central Line Care.  Review Central Line Care & Central Line Flush with patient.    Micafungin 100mg Q 24 hour IV for six weeks  Cefipime 2g Q 12 hour IV for       -- ID will schedule pt for f/u appt in ID clinic in 2-3wks    Scrub the Hub: Prior to accessing the line, always perform a 30 second alcohol scrub  Each lumen of the central line is to be flushed at least daily with 10 mL Normal Saline and 3 mL Heparin flush (10 units/mL)  Skilled Nurse (SN) may draw blood from IV access  Blood Draw Procedure:   - Aspirate at least 5 mL of blood   - Discard   - Obtain specimen   - Change injection cap   - Flush with 20 mL Normal Saline followed by a                 3-5 mL Heparin flush (10 units/mL)  Central :   - Sterile dressing changes are done weekly and as needed.   - Use chlor-hexadine scrub to cleanse site, apply Biopatch to insertion site,       apply securement device dressing   - Injection caps are changed weekly and after EVERY lab draw.   - If sterile gauze is under dressing to control oozing,                 dressing change must be performed every 24 hours until gauze is not needed.    Home Health Aide:  Nursing Three times weekly, Physical Therapy Three times weekly, and Occupational Therapy Three times weekly    Wound Care Orders  yes:  Surgical Wound:  Location: [facility]    Consult ET nurse        Apply the following to wound:   Wound vac changed three times weekly              Black sponge, 125 mmHg setting    I certify that this patient is confined to her home and needs intermittent skilled nursing care, physical therapy, and speech therapy.

## 2023-04-06 LAB
ALBUMIN SERPL BCP-MCNC: 1.9 G/DL (ref 3.5–5.2)
ALP SERPL-CCNC: 56 U/L (ref 55–135)
ALT SERPL W/O P-5'-P-CCNC: 9 U/L (ref 10–44)
ANION GAP SERPL CALC-SCNC: 9 MMOL/L (ref 8–16)
AST SERPL-CCNC: 21 U/L (ref 10–40)
BILIRUB SERPL-MCNC: 0.6 MG/DL (ref 0.1–1)
BLD PROD TYP BPU: NORMAL
BLD PROD TYP BPU: NORMAL
BLOOD UNIT EXPIRATION DATE: NORMAL
BLOOD UNIT EXPIRATION DATE: NORMAL
BLOOD UNIT TYPE CODE: 1700
BLOOD UNIT TYPE CODE: 1700
BLOOD UNIT TYPE: NORMAL
BLOOD UNIT TYPE: NORMAL
BUN SERPL-MCNC: 25 MG/DL (ref 8–23)
CALCIUM SERPL-MCNC: 8.4 MG/DL (ref 8.7–10.5)
CHLORIDE SERPL-SCNC: 106 MMOL/L (ref 95–110)
CO2 SERPL-SCNC: 23 MMOL/L (ref 23–29)
CODING SYSTEM: NORMAL
CODING SYSTEM: NORMAL
CREAT SERPL-MCNC: 0.8 MG/DL (ref 0.5–1.4)
CROSSMATCH INTERPRETATION: NORMAL
CROSSMATCH INTERPRETATION: NORMAL
DISPENSE STATUS: NORMAL
DISPENSE STATUS: NORMAL
ERYTHROCYTE [DISTWIDTH] IN BLOOD BY AUTOMATED COUNT: 15.2 % (ref 11.5–14.5)
ERYTHROCYTE [DISTWIDTH] IN BLOOD BY AUTOMATED COUNT: 15.7 % (ref 11.5–14.5)
EST. GFR  (NO RACE VARIABLE): >60 ML/MIN/1.73 M^2
FUNGUS SPEC CULT: NORMAL
GLUCOSE SERPL-MCNC: 163 MG/DL (ref 70–110)
HCT VFR BLD AUTO: 19 % (ref 37–48.5)
HCT VFR BLD AUTO: 25.9 % (ref 37–48.5)
HGB BLD-MCNC: 5.9 G/DL (ref 12–16)
HGB BLD-MCNC: 8.1 G/DL (ref 12–16)
MCH RBC QN AUTO: 26.7 PG (ref 27–31)
MCH RBC QN AUTO: 26.9 PG (ref 27–31)
MCHC RBC AUTO-ENTMCNC: 31.1 G/DL (ref 32–36)
MCHC RBC AUTO-ENTMCNC: 31.3 G/DL (ref 32–36)
MCV RBC AUTO: 86 FL (ref 82–98)
MCV RBC AUTO: 86 FL (ref 82–98)
NUM UNITS TRANS PACKED RBC: NORMAL
PLATELET # BLD AUTO: 200 K/UL (ref 150–450)
PLATELET # BLD AUTO: 228 K/UL (ref 150–450)
PMV BLD AUTO: 11 FL (ref 9.2–12.9)
PMV BLD AUTO: 11.4 FL (ref 9.2–12.9)
POCT GLUCOSE: 163 MG/DL (ref 70–110)
POCT GLUCOSE: 235 MG/DL (ref 70–110)
POCT GLUCOSE: 244 MG/DL (ref 70–110)
POTASSIUM SERPL-SCNC: 3.8 MMOL/L (ref 3.5–5.1)
PROT SERPL-MCNC: 6 G/DL (ref 6–8.4)
RBC # BLD AUTO: 2.21 M/UL (ref 4–5.4)
RBC # BLD AUTO: 3.01 M/UL (ref 4–5.4)
SODIUM SERPL-SCNC: 138 MMOL/L (ref 136–145)
TRANS ERYTHROCYTES VOL PATIENT: NORMAL ML
WBC # BLD AUTO: 12.4 K/UL (ref 3.9–12.7)
WBC # BLD AUTO: 9.41 K/UL (ref 3.9–12.7)

## 2023-04-06 PROCEDURE — 63600175 PHARM REV CODE 636 W HCPCS: Performed by: STUDENT IN AN ORGANIZED HEALTH CARE EDUCATION/TRAINING PROGRAM

## 2023-04-06 PROCEDURE — 25000003 PHARM REV CODE 250: Performed by: STUDENT IN AN ORGANIZED HEALTH CARE EDUCATION/TRAINING PROGRAM

## 2023-04-06 PROCEDURE — P9016 RBC LEUKOCYTES REDUCED: HCPCS

## 2023-04-06 PROCEDURE — 97165 OT EVAL LOW COMPLEX 30 MIN: CPT

## 2023-04-06 PROCEDURE — 25000003 PHARM REV CODE 250: Performed by: SURGERY

## 2023-04-06 PROCEDURE — P9021 RED BLOOD CELLS UNIT: HCPCS

## 2023-04-06 PROCEDURE — 80053 COMPREHEN METABOLIC PANEL: CPT | Performed by: STUDENT IN AN ORGANIZED HEALTH CARE EDUCATION/TRAINING PROGRAM

## 2023-04-06 PROCEDURE — A4216 STERILE WATER/SALINE, 10 ML: HCPCS | Performed by: SURGERY

## 2023-04-06 PROCEDURE — 97116 GAIT TRAINING THERAPY: CPT

## 2023-04-06 PROCEDURE — 85027 COMPLETE CBC AUTOMATED: CPT

## 2023-04-06 PROCEDURE — 97530 THERAPEUTIC ACTIVITIES: CPT

## 2023-04-06 PROCEDURE — 36430 TRANSFUSION BLD/BLD COMPNT: CPT

## 2023-04-06 PROCEDURE — 99024 PR POST-OP FOLLOW-UP VISIT: ICD-10-PCS | Mod: ,,, | Performed by: PODIATRIST

## 2023-04-06 PROCEDURE — 99024 POSTOP FOLLOW-UP VISIT: CPT | Mod: ,,, | Performed by: PODIATRIST

## 2023-04-06 PROCEDURE — 20600001 HC STEP DOWN PRIVATE ROOM

## 2023-04-06 RX ORDER — HYDROCODONE BITARTRATE AND ACETAMINOPHEN 500; 5 MG/1; MG/1
TABLET ORAL
Status: DISCONTINUED | OUTPATIENT
Start: 2023-04-06 | End: 2023-04-11 | Stop reason: HOSPADM

## 2023-04-06 RX ADMIN — PANTOPRAZOLE SODIUM 40 MG: 40 TABLET, DELAYED RELEASE ORAL at 08:04

## 2023-04-06 RX ADMIN — OXYCODONE HYDROCHLORIDE 5 MG: 5 TABLET ORAL at 05:04

## 2023-04-06 RX ADMIN — APIXABAN 5 MG: 5 TABLET, FILM COATED ORAL at 10:04

## 2023-04-06 RX ADMIN — CARVEDILOL 12.5 MG: 12.5 TABLET, FILM COATED ORAL at 08:04

## 2023-04-06 RX ADMIN — INSULIN ASPART 4 UNITS: 100 INJECTION, SOLUTION INTRAVENOUS; SUBCUTANEOUS at 02:04

## 2023-04-06 RX ADMIN — ACETAMINOPHEN 650 MG: 325 TABLET ORAL at 05:04

## 2023-04-06 RX ADMIN — Medication 10 ML: at 01:04

## 2023-04-06 RX ADMIN — CEFEPIME 2 G: 2 INJECTION, POWDER, FOR SOLUTION INTRAVENOUS at 02:04

## 2023-04-06 RX ADMIN — DULOXETINE 30 MG: 30 CAPSULE, DELAYED RELEASE ORAL at 10:04

## 2023-04-06 RX ADMIN — CARVEDILOL 12.5 MG: 12.5 TABLET, FILM COATED ORAL at 10:04

## 2023-04-06 RX ADMIN — ACETAMINOPHEN 650 MG: 325 TABLET ORAL at 12:04

## 2023-04-06 RX ADMIN — MICAFUNGIN SODIUM 100 MG: 100 INJECTION, POWDER, LYOPHILIZED, FOR SOLUTION INTRAVENOUS at 08:04

## 2023-04-06 RX ADMIN — Medication: at 10:04

## 2023-04-06 RX ADMIN — Medication 10 ML: at 05:04

## 2023-04-06 RX ADMIN — ASPIRIN 81 MG: 81 TABLET, COATED ORAL at 10:04

## 2023-04-06 RX ADMIN — PANTOPRAZOLE SODIUM 40 MG: 40 TABLET, DELAYED RELEASE ORAL at 10:04

## 2023-04-06 RX ADMIN — CLOPIDOGREL BISULFATE 75 MG: 75 TABLET ORAL at 10:04

## 2023-04-06 RX ADMIN — OXYCODONE HYDROCHLORIDE 5 MG: 5 TABLET ORAL at 10:04

## 2023-04-06 RX ADMIN — ATORVASTATIN CALCIUM 40 MG: 40 TABLET, FILM COATED ORAL at 08:04

## 2023-04-06 RX ADMIN — OXYCODONE HYDROCHLORIDE 5 MG: 5 TABLET ORAL at 09:04

## 2023-04-06 RX ADMIN — DULOXETINE 30 MG: 30 CAPSULE, DELAYED RELEASE ORAL at 08:04

## 2023-04-06 RX ADMIN — AMLODIPINE BESYLATE 10 MG: 10 TABLET ORAL at 10:04

## 2023-04-06 RX ADMIN — Medication 10 ML: at 12:04

## 2023-04-06 RX ADMIN — FUROSEMIDE 20 MG: 20 TABLET ORAL at 10:04

## 2023-04-06 RX ADMIN — INSULIN ASPART 4 UNITS: 100 INJECTION, SOLUTION INTRAVENOUS; SUBCUTANEOUS at 01:04

## 2023-04-06 RX ADMIN — APIXABAN 5 MG: 5 TABLET, FILM COATED ORAL at 08:04

## 2023-04-06 NOTE — PROGRESS NOTES
[facility] - [facility]  Vascular Surgery  Progress Note    Patient Name: Nelsy Marino  MRN: [id number]  Admission Date: 3/29/2023  Primary Care Provider: Juan F Garay    Subjective:     Interval History: No complaints overnight, VSS.  Patient needs to stay in hospital until tomorrow for plastic to change her wound vac dressing, will discuss plan with them.      Post-Op Info:  Procedure(s) (LRB):  Angiogram Extremity Unilateral - possible endovascular intervention (Right)   2 Days Post-Op       Medications:  Continuous Infusions:  Scheduled Meds:   acetaminophen  650 mg Oral Q6H    amLODIPine  10 mg Oral Daily    ammonium lactate   Topical (Top) Daily    apixaban  5 mg Oral BID    aspirin  81 mg Oral Daily    atorvastatin  40 mg Oral QHS    carvediloL  12.5 mg Oral BID    ceFEPime (MAXIPIME) IVPB  2 g Intravenous Q12H    clopidogreL  75 mg Oral Daily    DULoxetine  30 mg Oral BID    furosemide  20 mg Oral Daily    micafungin (MYCAMINE) IVPB  100 mg Intravenous Q24H    pantoprazole  40 mg Oral BID    sodium chloride 0.9%  10 mL Intravenous Q6H     PRN Meds:sodium chloride, sodium chloride, dextrose 10%, glucagon (human recombinant), insulin aspart U-100, ondansetron, oxyCODONE, sodium chloride 0.9%, Flushing PICC Protocol **AND** sodium chloride 0.9% **AND** sodium chloride 0.9%     Objective:     Vital Signs (Most Recent):  Temp: 98 °F (36.7 °C) (04/05/23 2300)  Pulse: 76 (04/05/23 2300)  Resp: 16 (04/06/23 0504)  BP: 128/61 (04/05/23 2300)  SpO2: 95 % (04/05/23 2300) Vital Signs (24h Range):  Temp:  [97.8 °F (36.6 °C)-98.4 °F (36.9 °C)] 98 °F (36.7 °C)  Pulse:  [76-85] 76  Resp:  [16-20] 16  SpO2:  [94 %-97 %] 95 %  BP: (117-136)/(49-65) 128/61         Physical Exam  Constitutional:       General: She is not in acute distress.     Appearance: She is not ill-appearing.   HENT:      Head: Normocephalic.      Mouth/Throat:      Mouth: Mucous membranes are moist.   Cardiovascular:      Rate and Rhythm:  Normal rate.      Comments:   Left: AT, PT triphasic signal    Pulmonary:      Effort: Pulmonary effort is normal. No respiratory distress.   Abdominal:      Palpations: Abdomen is soft.   Musculoskeletal:         General: Normal range of motion.   Skin:     General: Skin is warm and dry.      Capillary Refill: Capillary refill takes less than 2 seconds.      Comments: Left groin incision with wound vac in place. Good seal and on suction   Neurological:      General: No focal deficit present.      Mental Status: She is alert.       Significant Labs:  CBC:   Recent Labs   Lab 04/05/23  0402   WBC 9.24  9.30   RBC 2.80*  2.91*   HGB 7.4*  7.6*   HCT 23.8*  25.1*     271   MCV 85  86   MCH 26.4*  26.1*   MCHC 31.1*  30.3*     CMP:   Recent Labs   Lab 04/06/23  0503   *   CALCIUM 8.4*   ALBUMIN 1.9*   PROT 6.0      K 3.8   CO2 23      BUN 25*   CREATININE 0.8   ALKPHOS 56   ALT 9*   AST 21   BILITOT 0.6       Significant Diagnostics:  I have reviewed all pertinent imaging results/findings within the past 24 hours.    Assessment/Plan:     PVD (peripheral vascular disease)  Nelsy Marino is a 82 y.o. woman with history of HTN, T2DM, DVT, and PAD who underwent left to right fem to SFA bypass and right SFA angioplasty on 3/2/23 and was admitted until 3/20. She was seen by Dr. Dunbar in the office earlier today complaining of left rest pain and also found to have dehiscence of her left groin incision.  S/p wound washout and flip with plastics 3/31    OR 4/4: [facility] Angio    Meds:  -Hep Gtt  - Insulin SS  - Home meds  - Asa plavix statin  - Podiatry consulted for right TMA wound    ID Plan:  Recommendations / Plan:   Continue IV-micafungin 100mg Q24 (cxs +C.glabrata; +C.albicans)   Continue IV-Cefepime 2 g Q12h (renally dosed CrCl 34)  -- [Note: recent h/o R foot TMA wound dehiscence cx+ PSA (03/10/23), s/p cipro course for SSTI].   -- Podiatry following for TMA wound management.   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   -- ID will schedule pt for f/u appt in ID clinic in 2-3wks    Dispo:  DC when HH/ IV abx/ Picc line/ wound vac ready        Samantha Ledesma, VASILIY  Vascular Surgery  [facility]

## 2023-04-06 NOTE — PLAN OF CARE
Problem: Occupational Therapy  Goal: Occupational Therapy Goal  Description: Goals to be met by: 4/13/23    Patient will increase functional independence with ADLs by performing:    LE Dressing with Moderate Assistance.  Grooming while standing with Minimal Assistance.  Toileting from bedside commode with Moderate Assistance for hygiene and clothing management.   Supine to sit with Stand-by Assistance.  Step transfer with Minimal Assistance  Toilet transfer to bedside commode with Minimal Assistance.    Outcome: Ongoing, Progressing

## 2023-04-06 NOTE — PLAN OF CARE
04/06/23 1310   Post-Acute Status   Post-Acute Authorization Placement  (LTACH)   Post-Acute Placement Status Referrals Sent     The SW met with patient and her sister in law at bedside to review discharge recommendation of LTACH and is agreeable to plan. The patient's sister in law informed the SW that her brother is the patient's  and he has to make the final decision. The patient's sister in law reported that they were under the impression that the patient was going home. The SW contacted the patient's  to follow-up regarding the patient's LTACH placement. The SW informed the patient's  that the patient's care team recs are for LTACH placement. The SW informed the patient's  about the admission criteria for LTACH. The patient's  in an agreement with LTACH placement at this time.      Provided list of facilities in-network with patient's payor plan. Notified that blast referral sent to below listed facilities from list based on proximity to home/family support:   [facility]  2.   [facility]  3.  [facility]        The patient's  instructed to identify preference.    Preferred Facility: (if more than 1, listed in order of descending preference)  1.[facility]   2. [facility]     If an additional preferred facility not listed above is identified, additional referral to be sent. If above facilities unable to accept, will send additional referrals to in-network providers.        Phillip Evans LMSW  Case Management [facility]  Ext: [phone]

## 2023-04-06 NOTE — PLAN OF CARE
[facility]  Discharge Reassessment    Primary Care Provider: Juan F Garay    Expected Discharge Date: 4/7/2023    Reassessment (most recent)       Discharge Reassessment - 04/06/23 1454          Discharge Reassessment    Assessment Type Discharge Planning Reassessment     Discharge Plan A Home Health   [facility] accepted    Discharge Plan B Long-term acute care facility (LTAC)   [facility] has accepted the patient    DME Needed Upon Discharge  negative pressure wound therapy device    will not pay co-pay for vac until he hears from a provder that its absolutely necessary that the patient has it    Discharge Barriers Identified Does not adhere to care plan     Why the patient remains in the hospital Social issues;Requires continued medical care                   Sonya Villalpando RN, CM   Ext: [phone]

## 2023-04-06 NOTE — PT/OT/SLP EVAL
Occupational Therapy   Evaluation    Name: Nelsy Marino  MRN: [id number]  Admitting Diagnosis: Wound dehiscence  Recent Surgery: Procedure(s) (LRB):  Angiogram Extremity Unilateral - possible endovascular intervention (Right) 2 Days Post-Op    Recommendations:     Discharge Recommendations: nursing facility, skilled  Discharge Equipment Recommendations:     Barriers to discharge:       Assessment:     Nelsy Marino is a 82 y.o. female with a medical diagnosis of Wound dehiscence.  She presents s/p (L) groin debridement with flap on 3/31 and LLE angipgram on 4/4. Pt was a Max A stand/pivot t/f to the chair. Performance deficits affecting function: weakness, impaired endurance, impaired self care skills, impaired functional mobility, gait instability, impaired balance, decreased coordination, decreased lower extremity function, decreased safety awareness.      Rehab Prognosis: Fair; patient would benefit from acute skilled OT services to address these deficits and reach maximum level of function.       Plan:     Patient to be seen 3 x/week to address the above listed problems via self-care/home management, therapeutic activities, therapeutic exercises, neuromuscular re-education  Plan of Care Expires: 05/06/23  Plan of Care Reviewed with: patient, family    Subjective     Occupational Profile:  Living Environment: Pt and her  live in a [facility] with 1 LUMA. She has a tub/shower with a shower chair  Previous level of function: Assistance with bed mobility & t/fs as well as most complex ADLs; Was spending most of the time up in a W/C.  Roles and Routines: home body, sister, wife  Equipment Used at Home: walker, rolling, bedside commode, shower chair  Assistance upon Discharge: Upon discharge, pt will have assistance from family    Pain/Comfort:  Pain Rating 1: 0/10    Patients cultural, spiritual, Restoration conflicts given the current situation:      Objective:     Communicated with: rn prior to session.  Patient  found supine with   upon OT entry to room.    General Precautions: Standard, fall  Orthopedic Precautions:    Braces:    Respiratory Status: Room air    Occupational Performance:    Bed Mobility:    Patient completed Scooting/Bridging with contact guard assistance  Patient completed Supine to Sit with minimum assistance    Functional Mobility/Transfers:  Patient completed Sit <> Stand Transfer with maximal assistance  with  no assistive device   Patient completed Bed <> Chair Transfer using Stand Pivot technique with maximal assistance with no assistive device    Activities of Daily Living:  Feeding:  independence   Lower Body Dressing: total assistance     Cognitive/Visual Perceptual:  Cognitive/Psychosocial Skills:     -       Oriented to: Person, Place, Time, and Situation   -       Safety awareness/insight to disability: impaired     Physical Exam:  BUE AROM/MMT: WNL  Weak LLE with no active dorsiflexion   EOB balance = (S)    AMPAC 6 Click ADL:  AMPAC Total Score: 17    Treatment & Education:  UE ROM/MMT  Bed mobility training / assessment  Functional mobility assessment  Sit/standing balance assessment  Educated on importance of sitting OOB in bedside chair to promote increased strength, endurance & breathing.  Discussed OT POC / Post-acute plan    Patient left up in chair with all lines intact and call button in reach    GOALS:   Multidisciplinary Problems       Occupational Therapy Goals          Problem: Occupational Therapy    Goal Priority Disciplines Outcome Interventions   Occupational Therapy Goal     OT, PT/OT Ongoing, Progressing    Description: Goals to be met by: 4/13/23    Patient will increase functional independence with ADLs by performing:    LE Dressing with Moderate Assistance.  Grooming while standing with Minimal Assistance.  Toileting from bedside commode with Moderate Assistance for hygiene and clothing management.   Supine to sit with Stand-by Assistance.  Step transfer with Minimal  Assistance  Toilet transfer to bedside commode with Minimal Assistance.                         History:     Past Medical History:   Diagnosis Date    CHF (congestive heart failure)     Diabetes mellitus     DVT (deep venous thrombosis)     Hypertension     Miscarriage          Past Surgical History:   Procedure Laterality Date     femoral vascular surgery       ANGIOGRAPHY OF LOWER EXTREMITY Right 2/23/2023    Procedure: Angiogram Extremity Unilateral;  Surgeon: Danny Dunbar MD;  Location: [facility];  Service: Vascular;  Laterality: Right;  RN PHONE PREOP 2/20/2023----CK CONSENT    ANGIOGRAPHY OF LOWER EXTREMITY Right 4/4/2023    Procedure: Angiogram Extremity Unilateral - possible endovascular intervention;  Surgeon: René Simms MD;  Location: [facility] OR [address] [place] [address];  Service: Vascular;  Laterality: Right;  19.6 mins.  740.81 mGy  73.3399 Gy.cm  58ml dye  local - 1ml    CREATION OF FEMORAL-FEMORAL ARTERY BYPASS WITH GRAFT Bilateral 3/2/2023    Procedure: CREATION, BYPASS, ARTERIAL, FEMORAL TO FEMORAL, USING GRAFT, LEFT FEMORAL ENDARTERECTOMY, LEFT ILIAC ARTERY STENT PLACEMENT, RIGHT LOWER EXTREMITY ANGIOGRAM AND RIGHT SFA  ANGIOPLASTY;  Surgeon: Dnany Dunbar MD;  Location: [facility] OR [address] [place] [address];  Service: Vascular;  Laterality: Bilateral;   744.47 MGY  175.36 Gycm  Flouro time 20.5 mins      CREATION OF ILIOFEMORAL ARTERY BYPASS Right 3/20/2019    Procedure: CREATION, BYPASS, ARTERIAL, ILIAC TO FEMORAL, RIGHT LOWER EXTREMITY, RIGHT PROFUNDAPLASTY;  Surgeon: Danny Dunbar MD;  Location: [facility];  Service: Vascular;  Laterality: Right;  11:00AM START PER ANNE  RN PREOP 3/13/2019  ACT'S, CELL SAVER, GRAFTS, BOOK WATER---NEED H/P  CONSENT IN AM------HGBA1C    CREATION OF MUSCLE ROTATIONAL FLAP Left 3/31/2023    Procedure: CREATION, FLAP, MUSCLE ROTATION;  Surgeon: Brandon Smiley MD;  [facility];  Service: Plastics;  Laterality: Left;    ESOPHAGOGASTRODUODENOSCOPY N/A 3/17/2023     Procedure: EGD (ESOPHAGOGASTRODUODENOSCOPY);  Surgeon: Gee Rodriguez MD;  [facility]  Service: Endoscopy;  Laterality: N/A;    FOOT AMPUTATION THROUGH METATARSAL Right 3/4/2023    Procedure: AMPUTATION, FOOT, TRANSMETATARSAL;  Surgeon: Benjamin Godfrey DPM;  Location: [facility] OR [address] [place] [address];  Service: Podiatry;  Laterality: Right;    HYSTERECTOMY      ?unsure cervix present    INCISION AND DRAINAGE Right 4/12/2019    Procedure: Incision and Drainage - R groin washout, possible muscle flap;  Surgeon: Danny Dunbar MD;  Location: [facility] OR [address] [place] [address];  Service: Vascular;  Laterality: Right;  groin washout    INCISION AND DRAINAGE OF GROIN Right 5/3/2019    Procedure: Incision and Drainage R groin;  Surgeon: Danny Dunbar MD;  Location: [facility] OR [address] [place] [address];  Service: Vascular;  Laterality: Right;    WOUND DEBRIDEMENT Left 3/31/2023    Procedure: DEBRIDEMENT, WOUND;  Surgeon: René Simms MD;  Location: [address];  Service: Vascular;  Laterality: Left;       Time Tracking:     OT Date of Treatment: 04/06/23  OT Start Time: 0846  OT Stop Time: 0903  OT Total Time (min): 17 min    Billable Minutes:Evaluation 5  Therapeutic Activity 12    4/6/2023

## 2023-04-06 NOTE — PLAN OF CARE
[facility]    Visited with patient and  to discuss discharge home with home IV antibiotics. Patient and  have been discussing LTAC and are leaning more toward this option. Support offered. No further education done. Will continue to follow from afar.     [facility]  Jose M Gold Rn, Clinical Educator  Cell [phone]  Office [phone]  Fax [phone]

## 2023-04-06 NOTE — SUBJECTIVE & OBJECTIVE
Medications:  Continuous Infusions:  Scheduled Meds:   acetaminophen  650 mg Oral Q6H    amLODIPine  10 mg Oral Daily    ammonium lactate   Topical (Top) Daily    apixaban  5 mg Oral BID    aspirin  81 mg Oral Daily    atorvastatin  40 mg Oral QHS    carvediloL  12.5 mg Oral BID    ceFEPime (MAXIPIME) IVPB  2 g Intravenous Q12H    clopidogreL  75 mg Oral Daily    DULoxetine  30 mg Oral BID    furosemide  20 mg Oral Daily    micafungin (MYCAMINE) IVPB  100 mg Intravenous Q24H    pantoprazole  40 mg Oral BID    sodium chloride 0.9%  10 mL Intravenous Q6H     PRN Meds:sodium chloride, sodium chloride, dextrose 10%, glucagon (human recombinant), insulin aspart U-100, ondansetron, oxyCODONE, sodium chloride 0.9%, [place] PICC Protocol **AND** sodium chloride 0.9% **AND** sodium chloride 0.9%     Objective:     Vital Signs (Most Recent):  Temp: 98 °F (36.7 °C) (04/05/23 2300)  Pulse: 76 (04/05/23 2300)  Resp: 16 (04/06/23 0504)  BP: 128/61 (04/05/23 2300)  SpO2: 95 % (04/05/23 2300) Vital Signs (24h Range):  Temp:  [97.8 °F (36.6 °C)-98.4 °F (36.9 °C)] 98 °F (36.7 °C)  Pulse:  [76-85] 76  Resp:  [16-20] 16  SpO2:  [94 %-97 %] 95 %  BP: (117-136)/(49-65) 128/61         Physical Exam  Constitutional:       General: She is not in acute distress.     Appearance: She is not ill-appearing.   HENT:      Head: Normocephalic.      Mouth/Throat:      Mouth: Mucous membranes are moist.   Cardiovascular:      Rate and Rhythm: Normal rate.      Comments:   Left: AT, PT triphasic signal    Pulmonary:      Effort: Pulmonary effort is normal. No respiratory distress.   Abdominal:      Palpations: Abdomen is soft.   Musculoskeletal:         General: Normal range of motion.   Skin:     General: Skin is warm and dry.      Capillary Refill: Capillary refill takes less than 2 seconds.      Comments: Left groin incision with wound vac in place. Good seal and on suction   Neurological:      General: No focal deficit present.      Mental  Status: She is alert.       Significant Labs:  CBC:   Recent Labs   Lab 04/05/23  0402   WBC 9.24  9.30   RBC 2.80*  2.91*   HGB 7.4*  7.6*   HCT 23.8*  25.1*     271   MCV 85  86   MCH 26.4*  26.1*   MCHC 31.1*  30.3*     CMP:   Recent Labs   Lab 04/06/23  0503   *   CALCIUM 8.4*   ALBUMIN 1.9*   PROT 6.0      K 3.8   CO2 23      BUN 25*   CREATININE 0.8   ALKPHOS 56   ALT 9*   AST 21   BILITOT 0.6       Significant Diagnostics:  I have reviewed all pertinent imaging results/findings within the past 24 hours.

## 2023-04-07 LAB
ALBUMIN SERPL BCP-MCNC: 1.9 G/DL (ref 3.5–5.2)
ALP SERPL-CCNC: 58 U/L (ref 55–135)
ALT SERPL W/O P-5'-P-CCNC: 8 U/L (ref 10–44)
ANION GAP SERPL CALC-SCNC: 7 MMOL/L (ref 8–16)
AST SERPL-CCNC: 17 U/L (ref 10–40)
BASOPHILS # BLD AUTO: 0.02 K/UL (ref 0–0.2)
BASOPHILS # BLD AUTO: 0.03 K/UL (ref 0–0.2)
BASOPHILS NFR BLD: 0.2 % (ref 0–1.9)
BASOPHILS NFR BLD: 0.3 % (ref 0–1.9)
BILIRUB SERPL-MCNC: 0.6 MG/DL (ref 0.1–1)
BLD PROD TYP BPU: NORMAL
BLOOD UNIT EXPIRATION DATE: NORMAL
BLOOD UNIT TYPE CODE: 1700
BLOOD UNIT TYPE: NORMAL
BUN SERPL-MCNC: 23 MG/DL (ref 8–23)
CALCIUM SERPL-MCNC: 8.1 MG/DL (ref 8.7–10.5)
CHLORIDE SERPL-SCNC: 107 MMOL/L (ref 95–110)
CO2 SERPL-SCNC: 24 MMOL/L (ref 23–29)
CODING SYSTEM: NORMAL
CREAT SERPL-MCNC: 0.8 MG/DL (ref 0.5–1.4)
CROSSMATCH INTERPRETATION: NORMAL
DIFFERENTIAL METHOD: ABNORMAL
DIFFERENTIAL METHOD: ABNORMAL
DISPENSE STATUS: NORMAL
EOSINOPHIL # BLD AUTO: 0.3 K/UL (ref 0–0.5)
EOSINOPHIL # BLD AUTO: 0.4 K/UL (ref 0–0.5)
EOSINOPHIL NFR BLD: 3.1 % (ref 0–8)
EOSINOPHIL NFR BLD: 3.8 % (ref 0–8)
ERYTHROCYTE [DISTWIDTH] IN BLOOD BY AUTOMATED COUNT: 15.1 % (ref 11.5–14.5)
ERYTHROCYTE [DISTWIDTH] IN BLOOD BY AUTOMATED COUNT: 15.4 % (ref 11.5–14.5)
EST. GFR  (NO RACE VARIABLE): >60 ML/MIN/1.73 M^2
GLUCOSE SERPL-MCNC: 244 MG/DL (ref 70–110)
HCT VFR BLD AUTO: 21.2 % (ref 37–48.5)
HCT VFR BLD AUTO: 24.2 % (ref 37–48.5)
HGB BLD-MCNC: 6.7 G/DL (ref 12–16)
HGB BLD-MCNC: 7.8 G/DL (ref 12–16)
IMM GRANULOCYTES # BLD AUTO: 0.05 K/UL (ref 0–0.04)
IMM GRANULOCYTES # BLD AUTO: 0.06 K/UL (ref 0–0.04)
IMM GRANULOCYTES NFR BLD AUTO: 0.5 % (ref 0–0.5)
IMM GRANULOCYTES NFR BLD AUTO: 0.5 % (ref 0–0.5)
LYMPHOCYTES # BLD AUTO: 1.6 K/UL (ref 1–4.8)
LYMPHOCYTES # BLD AUTO: 1.6 K/UL (ref 1–4.8)
LYMPHOCYTES NFR BLD: 14 % (ref 18–48)
LYMPHOCYTES NFR BLD: 15.3 % (ref 18–48)
MCH RBC QN AUTO: 27.5 PG (ref 27–31)
MCH RBC QN AUTO: 27.7 PG (ref 27–31)
MCHC RBC AUTO-ENTMCNC: 31.6 G/DL (ref 32–36)
MCHC RBC AUTO-ENTMCNC: 32.2 G/DL (ref 32–36)
MCV RBC AUTO: 86 FL (ref 82–98)
MCV RBC AUTO: 87 FL (ref 82–98)
MONOCYTES # BLD AUTO: 0.5 K/UL (ref 0.3–1)
MONOCYTES # BLD AUTO: 0.6 K/UL (ref 0.3–1)
MONOCYTES NFR BLD: 5 % (ref 4–15)
MONOCYTES NFR BLD: 5.4 % (ref 4–15)
NEUTROPHILS # BLD AUTO: 8.1 K/UL (ref 1.8–7.7)
NEUTROPHILS # BLD AUTO: 8.5 K/UL (ref 1.8–7.7)
NEUTROPHILS NFR BLD: 75.9 % (ref 38–73)
NEUTROPHILS NFR BLD: 76 % (ref 38–73)
NRBC BLD-RTO: 0 /100 WBC
NRBC BLD-RTO: 0 /100 WBC
PLATELET # BLD AUTO: 175 K/UL (ref 150–450)
PLATELET # BLD AUTO: 201 K/UL (ref 150–450)
PMV BLD AUTO: 10.9 FL (ref 9.2–12.9)
PMV BLD AUTO: 11 FL (ref 9.2–12.9)
POCT GLUCOSE: 200 MG/DL (ref 70–110)
POCT GLUCOSE: 201 MG/DL (ref 70–110)
POCT GLUCOSE: 243 MG/DL (ref 70–110)
POCT GLUCOSE: 248 MG/DL (ref 70–110)
POCT GLUCOSE: 287 MG/DL (ref 70–110)
POCT GLUCOSE: 390 MG/DL (ref 70–110)
POTASSIUM SERPL-SCNC: 3.7 MMOL/L (ref 3.5–5.1)
PROT SERPL-MCNC: 5.7 G/DL (ref 6–8.4)
RBC # BLD AUTO: 2.44 M/UL (ref 4–5.4)
RBC # BLD AUTO: 2.82 M/UL (ref 4–5.4)
SODIUM SERPL-SCNC: 138 MMOL/L (ref 136–145)
TRANS ERYTHROCYTES VOL PATIENT: NORMAL ML
WBC # BLD AUTO: 10.68 K/UL (ref 3.9–12.7)
WBC # BLD AUTO: 11.21 K/UL (ref 3.9–12.7)

## 2023-04-07 PROCEDURE — 25000003 PHARM REV CODE 250: Performed by: STUDENT IN AN ORGANIZED HEALTH CARE EDUCATION/TRAINING PROGRAM

## 2023-04-07 PROCEDURE — 85025 COMPLETE CBC W/AUTO DIFF WBC: CPT | Mod: 91

## 2023-04-07 PROCEDURE — 25000003 PHARM REV CODE 250: Performed by: SURGERY

## 2023-04-07 PROCEDURE — A4216 STERILE WATER/SALINE, 10 ML: HCPCS | Performed by: SURGERY

## 2023-04-07 PROCEDURE — 20600001 HC STEP DOWN PRIVATE ROOM

## 2023-04-07 PROCEDURE — P9021 RED BLOOD CELLS UNIT: HCPCS

## 2023-04-07 PROCEDURE — 63600175 PHARM REV CODE 636 W HCPCS: Performed by: STUDENT IN AN ORGANIZED HEALTH CARE EDUCATION/TRAINING PROGRAM

## 2023-04-07 PROCEDURE — 25000003 PHARM REV CODE 250

## 2023-04-07 PROCEDURE — 80053 COMPREHEN METABOLIC PANEL: CPT | Performed by: STUDENT IN AN ORGANIZED HEALTH CARE EDUCATION/TRAINING PROGRAM

## 2023-04-07 PROCEDURE — 36430 TRANSFUSION BLD/BLD COMPNT: CPT

## 2023-04-07 PROCEDURE — 85025 COMPLETE CBC W/AUTO DIFF WBC: CPT | Performed by: SURGERY

## 2023-04-07 RX ORDER — OXYMETAZOLINE HCL 0.05 %
2 SPRAY, NON-AEROSOL (ML) NASAL 2 TIMES DAILY PRN
Status: DISPENSED | OUTPATIENT
Start: 2023-04-07 | End: 2023-04-10

## 2023-04-07 RX ORDER — HYDROCODONE BITARTRATE AND ACETAMINOPHEN 500; 5 MG/1; MG/1
TABLET ORAL
Status: DISCONTINUED | OUTPATIENT
Start: 2023-04-07 | End: 2023-04-11 | Stop reason: HOSPADM

## 2023-04-07 RX ADMIN — INSULIN ASPART 1 UNITS: 100 INJECTION, SOLUTION INTRAVENOUS; SUBCUTANEOUS at 02:04

## 2023-04-07 RX ADMIN — AMLODIPINE BESYLATE 10 MG: 10 TABLET ORAL at 09:04

## 2023-04-07 RX ADMIN — ACETAMINOPHEN 650 MG: 325 TABLET ORAL at 07:04

## 2023-04-07 RX ADMIN — MICAFUNGIN SODIUM 100 MG: 100 INJECTION, POWDER, LYOPHILIZED, FOR SOLUTION INTRAVENOUS at 08:04

## 2023-04-07 RX ADMIN — ATORVASTATIN CALCIUM 40 MG: 40 TABLET, FILM COATED ORAL at 08:04

## 2023-04-07 RX ADMIN — ASPIRIN 81 MG: 81 TABLET, COATED ORAL at 10:04

## 2023-04-07 RX ADMIN — ACETAMINOPHEN 650 MG: 325 TABLET ORAL at 05:04

## 2023-04-07 RX ADMIN — INSULIN ASPART 4 UNITS: 100 INJECTION, SOLUTION INTRAVENOUS; SUBCUTANEOUS at 05:04

## 2023-04-07 RX ADMIN — CARVEDILOL 12.5 MG: 12.5 TABLET, FILM COATED ORAL at 08:04

## 2023-04-07 RX ADMIN — CARVEDILOL 12.5 MG: 12.5 TABLET, FILM COATED ORAL at 09:04

## 2023-04-07 RX ADMIN — CEFEPIME 2 G: 2 INJECTION, POWDER, FOR SOLUTION INTRAVENOUS at 02:04

## 2023-04-07 RX ADMIN — PANTOPRAZOLE SODIUM 40 MG: 40 TABLET, DELAYED RELEASE ORAL at 08:04

## 2023-04-07 RX ADMIN — INSULIN ASPART 3 UNITS: 100 INJECTION, SOLUTION INTRAVENOUS; SUBCUTANEOUS at 11:04

## 2023-04-07 RX ADMIN — INSULIN ASPART 10 UNITS: 100 INJECTION, SOLUTION INTRAVENOUS; SUBCUTANEOUS at 12:04

## 2023-04-07 RX ADMIN — CLOPIDOGREL BISULFATE 75 MG: 75 TABLET ORAL at 10:04

## 2023-04-07 RX ADMIN — DULOXETINE 30 MG: 30 CAPSULE, DELAYED RELEASE ORAL at 09:04

## 2023-04-07 RX ADMIN — Medication 10 ML: at 06:04

## 2023-04-07 RX ADMIN — Medication: at 10:04

## 2023-04-07 RX ADMIN — INSULIN ASPART 4 UNITS: 100 INJECTION, SOLUTION INTRAVENOUS; SUBCUTANEOUS at 08:04

## 2023-04-07 RX ADMIN — Medication 10 ML: at 12:04

## 2023-04-07 RX ADMIN — ACETAMINOPHEN 650 MG: 325 TABLET ORAL at 01:04

## 2023-04-07 RX ADMIN — Medication 10 ML: at 07:04

## 2023-04-07 RX ADMIN — Medication 10 ML: at 11:04

## 2023-04-07 RX ADMIN — DULOXETINE 30 MG: 30 CAPSULE, DELAYED RELEASE ORAL at 08:04

## 2023-04-07 RX ADMIN — FUROSEMIDE 20 MG: 20 TABLET ORAL at 09:04

## 2023-04-07 RX ADMIN — PANTOPRAZOLE SODIUM 40 MG: 40 TABLET, DELAYED RELEASE ORAL at 09:04

## 2023-04-07 RX ADMIN — Medication 10 ML: at 01:04

## 2023-04-07 RX ADMIN — OXYMETAZOLINE HYDROCHLORIDE 2 SPRAY: 0.05 SPRAY NASAL at 03:04

## 2023-04-07 NOTE — PLAN OF CARE
Recommendations     1.) Recommend continuing with Diabetic Diet, 2,000Kcal as tolerated- encourage PO intake.      2.) Recommend continuing with Boost Glucose Control TID to provide an additional 750kcal and 42gPRO- encourage PO intake.      3.) Recommend considering an appetite stimulant, if appropriate.      4.) Recommend daily wts to monitor.      5.)  RD to monitor wt, PO intake, skin, labs.        Goals: to meet % of EEN/EPN by next RD f/u  Nutrition Goal Status: new  Communication of RD Recs:  (POC)

## 2023-04-07 NOTE — NURSING
Spoke with Doctor Melanie about patient bleeding from nose and having dark tarry stools. Stated to hold off on CBC until tomorrow unless patient feels dizzy, lightheaded, or becomes hemodynamically unstable. [facility] closely, Eliquis held this AM    Update: patient seen in person, labs will be drawn and GI will be consulted.

## 2023-04-07 NOTE — PT/OT/SLP PROGRESS
Physical Therapy Co-Treatment  Co-treatment performed to appropriately and safely address patient's strength and endurance deficits while facilitating functional tasks in addition to accommodating for patient's activity/pain tolerance.    Patient Name:  Nelsy Marino   MRN:  [id number]    Recommendations:     Discharge Recommendations: nursing facility, skilled  Discharge Equipment Recommendations:  (TBD pending pt progress)  Barriers to discharge:  increased level of assistance    Assessment:     Nelsy Marino is a 82 y.o. female admitted with a medical diagnosis of Wound dehiscence.  She presents with the following impairments/functional limitations: weakness, impaired endurance, impaired self care skills, impaired functional mobility, gait instability, impaired balance, decreased upper extremity function, decreased lower extremity function, decreased ROM, impaired skin, orthopedic precautions. Pt would benefit from a skilled nursing facility for: Dynamic/static standing/sitting balance through skilled balance training, strengthening with the use of skilled therapeutic exercises interventions, and mobility through adaptive equipment training. Pt continues to benefit from a collaborative PT/OT program to improve quality of life and focus on recovery of impairments.      Rehab Prognosis: Good; patient would benefit from acute skilled PT services to address these deficits and reach maximum level of function.    Recent Surgery: Procedure(s) (LRB):  Angiogram Extremity Unilateral - possible endovascular intervention (Right) 2 Days Post-Op    Plan:     During this hospitalization, patient to be seen 3 x/week to address the identified rehab impairments via gait training, therapeutic activities, therapeutic exercises, neuromuscular re-education and progress toward the following goals:    Plan of Care Expires:  05/05/23    Subjective     Chief Complaint: none verbalized  Patient/Family Comments/goals: return to  "[facility]  Pain/Comfort:  Pain Rating 1: 0/10  Pain Rating Post-Intervention 1: 0/10      Objective:     Communicated with RN prior to session.  Patient found supine with wound vac, telemetry, pulse ox (continuous), PICC line, peripheral IV, PureWick, WILTON drain upon PT entry to room.     General Precautions: Standard, fall  Orthopedic Precautions:  ("Can mobilize short distances with straight leg")  Braces: N/A  Respiratory Status: Room air     Functional Mobility:  Bed Mobility:  Scooting: contact guard assistance  Supine to Sit: minimum assistance  Transfers:     Sit to Stand:  maximal assistance with no AD  Bed to Chair: maximal assistance with  no AD  using  Stand Pivot  Gait: 2-3 steps L at EOB, modAx2; decreased gait speed and step length, impaired BLE clearance  Balance:   Static Sitting: SBA  Dynamic Sitting: SBA  Static Standing: maxA  Dynamic Standing: modAx2      AM-PAC 6 CLICK MOBILITY  Turning over in bed (including adjusting bedclothes, sheets and blankets)?: 3  Sitting down on and standing up from a chair with arms (e.g., wheelchair, bedside commode, etc.): 2  Moving from lying on back to sitting on the side of the bed?: 3  Moving to and from a bed to a chair (including a wheelchair)?: 2  Need to walk in hospital room?: 1  Climbing 3-5 steps with a railing?: 1  Basic Mobility Total Score: 12       Treatment & Education:  Patient educated on role of therapy, goals of session, and benefits of mobilizing.   Discussed PT plan of care during hospitalization.   Patient educated on calling for assistance.   Patient educated on how their diagnosis impacts their mobility within PT scope of practice.   All questions answered within PT scope of practice.    Patient left up in chair with all lines intact, call button in reach, RN notified, and pt's family present.    GOALS:   Multidisciplinary Problems       Physical Therapy Goals          Problem: Physical Therapy    Goal Priority Disciplines Outcome Goal Variances " Interventions   Physical Therapy Goal     PT, PT/OT Ongoing, Progressing     Description: Goals to be met by: 2023     Patient will increase functional independence with mobility by performin. Supine to sit with [place]  2. Sit to supine with [place]  3. Sit to stand transfer with Nilda using RW  4. Bed to chair transfer with Nilda using Rolling Walker  5. Gait  x 10 feet with Minimal Assistance using Rolling Walker.   6. Lower extremity exercise program x15 reps per handout, with supervision                         Time Tracking:     PT Received On: 23  PT Start Time: 0846     PT Stop Time: 0900  PT Total Time (min): 14 min     Billable Minutes: Gait Training 8    Treatment Type: Evaluation  PT/PTA: PT     Number of PTA visits since last PT visit: 0     2023

## 2023-04-07 NOTE — ASSESSMENT & PLAN NOTE
Nelsy Marino is a 82 y.o. woman with history of HTN, T2DM, DVT, and PAD who underwent left to right fem to SFA bypass and right SFA angioplasty on 3/2/23 and was admitted until 3/20. She was seen by Dr. Dunbar in the office earlier today complaining of left rest pain and also found to have dehiscence of her left groin incision.  S/p wound washout and flip with plastics 3/31, LLE angio on 4/4.     - floor status   - Eliquis held given persistent nose bleed  - Insulin SS  - Home meds  - Asa, plavix, statin  - Podiatry following for right TMA wound    ID Plan:  Recommendations / Plan:   Continue IV-micafungin 100mg Q24 (cxs +C.glabrata; +C.albicans)   Continue IV-Cefepime 2 g Q12h (renally dosed CrCl 34)  -- [Note: recent h/o R foot TMA wound dehiscence cx+ PSA (03/10/23), s/p cipro course for SSTI].   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   -- ID will schedule pt for f/u appt in ID clinic in 2-3wks    Dispo: LTAC accepted, has PICC for IV abx, pending wound vac per plastics

## 2023-04-07 NOTE — PROGRESS NOTES
[facility]  Adult Nutrition  Progress Note    SUMMARY       Recommendations    1.) Recommend continuing with Diabetic Diet, 2,000Kcal as tolerated- encourage PO intake.     2.) Recommend continuing with Boost Glucose Control TID to provide an additional 750kcal and 42gPRO- encourage PO intake.     3.) Recommend considering an appetite stimulant, if appropriate.     4.) Recommend daily wts to monitor.     5.)  RD to monitor wt, PO intake, skin, labs.      Goals: to meet % of EEN/EPN by next RD f/u  Nutrition Goal Status: new  Communication of RD Recs:  (POC)    Assessment and Plan    Nutrition Problem:  Severe Protein-Calorie Malnutrition  Malnutrition in the context of Acute Illness/Injury    Related to (etiology):  Poor Po intake; wound dehiscence    Signs and Symptoms (as evidenced by):  Energy Intake: <50% of estimated energy requirement for x5 days  Body Fat Depletion: mild and moderate depletion of orbitals   Muscle Mass Depletion: mild and moderate depletion of temples   Weight Loss: -9% x 1mo     Interventions(treatment strategy):  Collaboration of nutritional care with other providers    Nutrition Diagnosis Status:  New     Malnutrition Assessment  Malnutrition Type: acute illness or injury          Weight Loss (Malnutrition): greater than 5% in 1 month  Energy Intake (Malnutrition): less than or equal to 50% for greater than or equal to 5 days   Orbital Region (Subcutaneous Fat Loss): mild depletion   [place] Region (Muscle Loss): moderate depletion     Reason for Assessment    Reason For Assessment: length of stay  Diagnosis:  (Wound dehiscence; S/p wound washout and flip with plastics 3/31, LLE angio on 4/4.)  Relevant Medical History: HTN, DM2, DVT,  PAD, CHF; s/p right foot amp (3/2)  Interdisciplinary Rounds: did not attend  General Information Comments: RD seen pt for LOS. Pt was sleeping off and on at the time of the RD interview. Pt denies n/v/d/c, but endorses poor appetite of the  "facility food. Pt stated to RD that they do drink and enjoy the Boost Glucose Control. Wt loss noted: -9%x1mo, -11%x3mo. Visual  NFPE performed on 4-7: mild to moderate wasting noted  in the temporal region and orbitals. Pt meets criteria for severe malnutrition d/t wt loss and poor PO intake.  Nutrition Discharge Planning: Diabetic diet    Nutrition Risk Screen    Nutrition Risk Screen: no indicators present    Nutrition/Diet History    Spiritual, Cultural Beliefs, Mosque Practices, Values that Affect Care: no    Anthropometrics    Temp: 98.3 °F (36.8 °C)  Height Method: Stated  Height: 5' 4" (162.6 cm)  Height (inches): 64 in  Weight Method: Bed Scale  Weight: 67 kg (147 lb 11.3 oz)  Weight (lb): 147.71 lb  Ideal Body Weight (IBW), Female: 120 lb  % Ideal Body Weight, Female (lb): 123.09 %  BMI (Calculated): 25.3  BMI Grade: 25 - 29.9 - overweight     Lab/Procedures/Meds    Pertinent Labs Reviewed: reviewed  Pertinent Labs Comments: gluc: 244, PRO: 5.7, alb: 1.9, ALT: 8  Pertinent Medications Reviewed: reviewed  Pertinent Medications Comments: amlodipine, atorvastatin, lasix, pantoprazole, NaCl    Estimated/Assessed Needs    Weight Used For Calorie Calculations: 67 kg (147 lb 11.3 oz)  Energy Calorie Requirements (kcal): 1675- 2010kcal  Energy Need Method: Kcal/kg (25-30kcal/kg)  Protein Requirements: 81- 94g (1.2-1.4g/kg)  Weight Used For Protein Calculations: 67 kg (147 lb 11.3 oz)  Fluid Requirements (mL): 1ml/1kcal or per MD  Estimated Fluid Requirement Method: RDA Method  RDA Method (mL): 1675     Nutrition Prescription Ordered    Current Diet Order: Diabetic, 2,000kcal  Oral Nutrition Supplement: Boost Glucose Control TID    Evaluation of Received Nutrient/Fluid Intake    I/O: +525ml since 4/2  Energy Calories Required: not meeting needs  Protein Required: not meeting needs  Fluid Required:  (as per MD)  Comments: LBM 4/2  Tolerance: tolerating  % Intake of Estimated Energy Needs: 0 - 25 %  % Meal Intake: " 0 - 25 %    Nutrition Risk    Level of Risk/Frequency of Follow-up:  (RD to f/u x1/week)     Monitor and Evaluation    Food and Nutrient Intake: energy intake, food and beverage intake  Food and Nutrient Adminstration: diet order  Physical Activity and Function: nutrition-related ADLs and IADLs  Anthropometric Measurements: weight, body mass index, weight change  Biochemical Data, Medical Tests and Procedures: electrolyte and renal panel, gastrointestinal profile, glucose/endocrine profile, inflammatory profile, lipid profile  Nutrition-Focused Physical Findings: overall appearance, skin     Nutrition Follow-Up    RD Follow-up?: Yes

## 2023-04-07 NOTE — PLAN OF CARE
Problem: Adult Inpatient Plan of Care  Goal: Plan of Care Review  Outcome: Ongoing, Progressing  Goal: Patient-Specific Goal (Individualized)  Outcome: Ongoing, Progressing  Goal: Absence of Hospital-Acquired Illness or Injury  Outcome: Ongoing, Progressing  Goal: Optimal Comfort and Wellbeing  Outcome: Ongoing, Progressing  Goal: Readiness for Transition of Care  Outcome: Ongoing, Progressing     Problem: Diabetes Comorbidity  Goal: Blood Glucose Level Within Targeted Range  Outcome: Ongoing, Progressing     Problem: Adjustment to Illness (Sepsis/Septic Shock)  Goal: Optimal Coping  Outcome: Ongoing, Progressing     Problem: Bleeding (Sepsis/Septic Shock)  Goal: Absence of Bleeding  Outcome: Ongoing, Progressing     Problem: Glycemic Control Impaired (Sepsis/Septic Shock)  Goal: Blood Glucose Level Within Desired Range  Outcome: Ongoing, Progressing     Problem: Infection Progression (Sepsis/Septic Shock)  Goal: Absence of Infection Signs and Symptoms  Outcome: Ongoing, Progressing     Problem: Nutrition Impaired (Sepsis/Septic Shock)  Goal: Optimal Nutrition Intake  Outcome: Ongoing, Progressing     Problem: Skin Injury Risk Increased  Goal: Skin Health and Integrity  Outcome: Ongoing, Progressing     Problem: Fall Injury Risk  Goal: Absence of Fall and Fall-Related Injury  Outcome: Ongoing, Progressing     Problem: Infection  Goal: Absence of Infection Signs and Symptoms  Outcome: Ongoing, Progressing      PRN pain medication given per request with effective results reported, spouse at bedside t/o sift, PIV leaking dc'd, had period of epistaxis during noc, pressure with tamponade effective in stopping, will continue to monitor and assess for further needs.   Patient continues to have episodes of epistaxis four in total stopped with pressure but continues soon after, MD notified on rounds

## 2023-04-07 NOTE — PROGRESS NOTES
[facility]  Vascular Surgery  Progress Note    Patient Name: Nelsy Marino  MRN: [id number]  Admission Date: 3/29/2023  Primary Care Provider: Juan F Garay    Subjective:     Interval History: persistent nosebleed overnight, eliquis held. Pulse exam stable.     Post-Op Info:  Procedure(s) (LRB):  Angiogram Extremity Unilateral - possible endovascular intervention (Right)   3 Days Post-Op       Medications:  Continuous Infusions:  Scheduled Meds:   acetaminophen  650 mg Oral Q6H    amLODIPine  10 mg Oral Daily    ammonium lactate   Topical (Top) Daily    aspirin  81 mg Oral Daily    atorvastatin  40 mg Oral QHS    carvediloL  12.5 mg Oral BID    ceFEPime (MAXIPIME) IVPB  2 g Intravenous Q12H    clopidogreL  75 mg Oral Daily    DULoxetine  30 mg Oral BID    furosemide  20 mg Oral Daily    micafungin (MYCAMINE) IVPB  100 mg Intravenous Q24H    pantoprazole  40 mg Oral BID    sodium chloride 0.9%  10 mL Intravenous Q6H     PRN Meds:sodium chloride, dextrose 10%, glucagon (human recombinant), insulin aspart U-100, ondansetron, oxyCODONE, sodium chloride 0.9%, Flushing PICC Protocol **AND** sodium chloride 0.9% **AND** sodium chloride 0.9%     Objective:     Vital Signs (Most Recent):  Temp: 98.5 °F (36.9 °C) (04/07/23 0754)  Pulse: 82 (04/07/23 0754)  Resp: 20 (04/07/23 0754)  BP: (!) 153/68 (04/07/23 0754)  SpO2: 97 % (04/07/23 0754)   Vital Signs (24h Range):  Temp:  [98 °F (36.7 °C)-98.6 °F (37 °C)] 98.5 °F (36.9 °C)  Pulse:  [79-89] 82  Resp:  [16-20] 20  SpO2:  [91 %-99 %] 97 %  BP: (115-153)/(53-68) 153/68         Physical Exam  Physical Exam  Constitutional:       General: She is not in acute distress.     Appearance: She is not ill-appearing.   HENT:      Head: Normocephalic.      Mouth/Throat:      Mouth: Mucous membranes are moist.   Cardiovascular:      Rate and Rhythm: Normal rate.      Comments:   Left: AT, PT biphasic signal     Pulmonary:      Effort: Pulmonary effort is normal. No  respiratory distress.   Abdominal:      Palpations: Abdomen is soft.   Musculoskeletal:         General: Normal range of motion.   Skin:     General: Skin is warm and dry.      Capillary Refill: Capillary refill takes less than 2 seconds.      Comments: Left groin incision with wound vac in place. Good seal and on suction   Neurological:      General: No focal deficit present.      Mental Status: She is alert.        Significant Labs:  CBC:   Recent Labs   Lab 04/07/23  0647   WBC 11.21   RBC 2.82*   HGB 7.8*   HCT 24.2*      MCV 86   MCH 27.7   MCHC 32.2     CMP:   Recent Labs   Lab 04/07/23  0647   *   CALCIUM 8.1*   ALBUMIN 1.9*   PROT 5.7*      K 3.7   CO2 24      BUN 23   CREATININE 0.8   ALKPHOS 58   ALT 8*   AST 17   BILITOT 0.6       Significant Diagnostics:  I have reviewed all pertinent imaging results/findings within the past 24 hours.    Assessment/Plan:     PVD (peripheral vascular disease)  Nelsy Marino is a 82 y.o. woman with history of HTN, T2DM, DVT, and PAD who underwent left to right fem to SFA bypass and right SFA angioplasty on 3/2/23 and was admitted until 3/20. She was seen by Dr. Dunbar in the office earlier today complaining of left rest pain and also found to have dehiscence of her left groin incision.  S/p wound washout and flip with plastics 3/31, LLE angio on 4/4.     - floor status   - Eliquis held given persistent nose bleed  - Insulin SS  - Home meds  - Asa, plavix, statin  - Podiatry following for right TMA wound    ID Plan:  Recommendations / Plan:   Continue IV-micafungin 100mg Q24 (cxs +C.glabrata; +C.albicans)   Continue IV-Cefepime 2 g Q12h (renally dosed CrCl 34)  -- [Note: recent h/o R foot TMA wound dehiscence cx+ PSA (03/10/23), s/p cipro course for SSTI].   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   -- ID will schedule pt for f/u appt in ID clinic in  2-3wks    Dispo: LTAC accepted, has PICC for IV abx, pending wound vac per plastics        Vonnie Mendez MD  Vascular Surgery  [facility]

## 2023-04-07 NOTE — SUBJECTIVE & OBJECTIVE
Medications:  Continuous Infusions:  Scheduled Meds:   acetaminophen  650 mg Oral Q6H    amLODIPine  10 mg Oral Daily    ammonium lactate   Topical (Top) Daily    aspirin  81 mg Oral Daily    atorvastatin  40 mg Oral QHS    carvediloL  12.5 mg Oral BID    ceFEPime (MAXIPIME) IVPB  2 g Intravenous Q12H    clopidogreL  75 mg Oral Daily    DULoxetine  30 mg Oral BID    furosemide  20 mg Oral Daily    micafungin (MYCAMINE) IVPB  100 mg Intravenous Q24H    pantoprazole  40 mg Oral BID    sodium chloride 0.9%  10 mL Intravenous Q6H     PRN Meds:sodium chloride, dextrose 10%, glucagon (human recombinant), insulin aspart U-100, ondansetron, oxyCODONE, sodium chloride 0.9%, Flushing PICC Protocol **AND** sodium chloride 0.9% **AND** sodium chloride 0.9%     Objective:     Vital Signs (Most Recent):  Temp: 98.5 °F (36.9 °C) (04/07/23 0754)  Pulse: 82 (04/07/23 0754)  Resp: 20 (04/07/23 0754)  BP: (!) 153/68 (04/07/23 0754)  SpO2: 97 % (04/07/23 0754)   Vital Signs (24h Range):  Temp:  [98 °F (36.7 °C)-98.6 °F (37 °C)] 98.5 °F (36.9 °C)  Pulse:  [79-89] 82  Resp:  [16-20] 20  SpO2:  [91 %-99 %] 97 %  BP: (115-153)/(53-68) 153/68         Physical Exam  Physical Exam  Constitutional:       General: She is not in acute distress.     Appearance: She is not ill-appearing.   HENT:      Head: Normocephalic.      Mouth/Throat:      Mouth: Mucous membranes are moist.   Cardiovascular:      Rate and Rhythm: Normal rate.      Comments:   Left: AT, PT biphasic signal     Pulmonary:      Effort: Pulmonary effort is normal. No respiratory distress.   Abdominal:      Palpations: Abdomen is soft.   Musculoskeletal:         General: Normal range of motion.   Skin:     General: Skin is warm and dry.      Capillary Refill: Capillary refill takes less than 2 seconds.      Comments: Left groin incision with wound vac in place. Good seal and on suction   Neurological:      General: No focal deficit present.      Mental Status: She is alert.         Significant Labs:  CBC:   Recent Labs   Lab 04/07/23  0647   WBC 11.21   RBC 2.82*   HGB 7.8*   HCT 24.2*      MCV 86   MCH 27.7   MCHC 32.2     CMP:   Recent Labs   Lab 04/07/23  0647   *   CALCIUM 8.1*   ALBUMIN 1.9*   PROT 5.7*      K 3.7   CO2 24      BUN 23   CREATININE 0.8   ALKPHOS 58   ALT 8*   AST 17   BILITOT 0.6       Significant Diagnostics:  I have reviewed all pertinent imaging results/findings within the past 24 hours.

## 2023-04-08 PROBLEM — D64.9 ACUTE ON CHRONIC ANEMIA: Status: ACTIVE | Noted: 2023-04-08

## 2023-04-08 LAB
ALBUMIN SERPL BCP-MCNC: 2 G/DL (ref 3.5–5.2)
ALP SERPL-CCNC: 60 U/L (ref 55–135)
ALT SERPL W/O P-5'-P-CCNC: 9 U/L (ref 10–44)
ANION GAP SERPL CALC-SCNC: 6 MMOL/L (ref 8–16)
AST SERPL-CCNC: 17 U/L (ref 10–40)
BASOPHILS # BLD AUTO: 0.02 K/UL (ref 0–0.2)
BASOPHILS NFR BLD: 0.2 % (ref 0–1.9)
BILIRUB SERPL-MCNC: 0.7 MG/DL (ref 0.1–1)
BUN SERPL-MCNC: 27 MG/DL (ref 8–23)
CALCIUM SERPL-MCNC: 8.2 MG/DL (ref 8.7–10.5)
CHLORIDE SERPL-SCNC: 108 MMOL/L (ref 95–110)
CO2 SERPL-SCNC: 24 MMOL/L (ref 23–29)
CREAT SERPL-MCNC: 0.7 MG/DL (ref 0.5–1.4)
DIFFERENTIAL METHOD: ABNORMAL
EOSINOPHIL # BLD AUTO: 0.4 K/UL (ref 0–0.5)
EOSINOPHIL NFR BLD: 3.1 % (ref 0–8)
ERYTHROCYTE [DISTWIDTH] IN BLOOD BY AUTOMATED COUNT: 15.4 % (ref 11.5–14.5)
EST. GFR  (NO RACE VARIABLE): >60 ML/MIN/1.73 M^2
GLUCOSE SERPL-MCNC: 180 MG/DL (ref 70–110)
HCT VFR BLD AUTO: 24.9 % (ref 37–48.5)
HGB BLD-MCNC: 8.2 G/DL (ref 12–16)
IMM GRANULOCYTES # BLD AUTO: 0.05 K/UL (ref 0–0.04)
IMM GRANULOCYTES NFR BLD AUTO: 0.4 % (ref 0–0.5)
LYMPHOCYTES # BLD AUTO: 1.5 K/UL (ref 1–4.8)
LYMPHOCYTES NFR BLD: 13 % (ref 18–48)
MCH RBC QN AUTO: 28.1 PG (ref 27–31)
MCHC RBC AUTO-ENTMCNC: 32.9 G/DL (ref 32–36)
MCV RBC AUTO: 85 FL (ref 82–98)
MONOCYTES # BLD AUTO: 0.6 K/UL (ref 0.3–1)
MONOCYTES NFR BLD: 5.2 % (ref 4–15)
NEUTROPHILS # BLD AUTO: 9.2 K/UL (ref 1.8–7.7)
NEUTROPHILS NFR BLD: 78.1 % (ref 38–73)
NRBC BLD-RTO: 0 /100 WBC
PLATELET # BLD AUTO: 182 K/UL (ref 150–450)
PMV BLD AUTO: 10.4 FL (ref 9.2–12.9)
POCT GLUCOSE: 186 MG/DL (ref 70–110)
POCT GLUCOSE: 198 MG/DL (ref 70–110)
POCT GLUCOSE: 214 MG/DL (ref 70–110)
POCT GLUCOSE: 222 MG/DL (ref 70–110)
POCT GLUCOSE: 260 MG/DL (ref 70–110)
POTASSIUM SERPL-SCNC: 3.6 MMOL/L (ref 3.5–5.1)
PROT SERPL-MCNC: 5.8 G/DL (ref 6–8.4)
RBC # BLD AUTO: 2.92 M/UL (ref 4–5.4)
SODIUM SERPL-SCNC: 138 MMOL/L (ref 136–145)
WBC # BLD AUTO: 11.81 K/UL (ref 3.9–12.7)

## 2023-04-08 PROCEDURE — 25000003 PHARM REV CODE 250: Performed by: STUDENT IN AN ORGANIZED HEALTH CARE EDUCATION/TRAINING PROGRAM

## 2023-04-08 PROCEDURE — 25000003 PHARM REV CODE 250: Performed by: SURGERY

## 2023-04-08 PROCEDURE — 80053 COMPREHEN METABOLIC PANEL: CPT | Performed by: STUDENT IN AN ORGANIZED HEALTH CARE EDUCATION/TRAINING PROGRAM

## 2023-04-08 PROCEDURE — 85025 COMPLETE CBC W/AUTO DIFF WBC: CPT

## 2023-04-08 PROCEDURE — 63600175 PHARM REV CODE 636 W HCPCS: Performed by: STUDENT IN AN ORGANIZED HEALTH CARE EDUCATION/TRAINING PROGRAM

## 2023-04-08 PROCEDURE — C9113 INJ PANTOPRAZOLE SODIUM, VIA: HCPCS | Performed by: STUDENT IN AN ORGANIZED HEALTH CARE EDUCATION/TRAINING PROGRAM

## 2023-04-08 PROCEDURE — 20600001 HC STEP DOWN PRIVATE ROOM

## 2023-04-08 PROCEDURE — 99223 PR INITIAL HOSPITAL CARE,LEVL III: ICD-10-PCS | Mod: GC,,, | Performed by: INTERNAL MEDICINE

## 2023-04-08 PROCEDURE — A4216 STERILE WATER/SALINE, 10 ML: HCPCS | Performed by: SURGERY

## 2023-04-08 PROCEDURE — 99223 1ST HOSP IP/OBS HIGH 75: CPT | Mod: GC,,, | Performed by: INTERNAL MEDICINE

## 2023-04-08 RX ORDER — PANTOPRAZOLE SODIUM 40 MG/10ML
40 INJECTION, POWDER, LYOPHILIZED, FOR SOLUTION INTRAVENOUS 2 TIMES DAILY
Status: DISCONTINUED | OUTPATIENT
Start: 2023-04-08 | End: 2023-04-11 | Stop reason: HOSPADM

## 2023-04-08 RX ADMIN — CARVEDILOL 12.5 MG: 12.5 TABLET, FILM COATED ORAL at 09:04

## 2023-04-08 RX ADMIN — Medication: at 10:04

## 2023-04-08 RX ADMIN — DULOXETINE 30 MG: 30 CAPSULE, DELAYED RELEASE ORAL at 08:04

## 2023-04-08 RX ADMIN — PANTOPRAZOLE SODIUM 40 MG: 40 INJECTION, POWDER, FOR SOLUTION INTRAVENOUS at 08:04

## 2023-04-08 RX ADMIN — Medication 10 ML: at 06:04

## 2023-04-08 RX ADMIN — Medication 10 ML: at 11:04

## 2023-04-08 RX ADMIN — CLOPIDOGREL BISULFATE 75 MG: 75 TABLET ORAL at 09:04

## 2023-04-08 RX ADMIN — MICAFUNGIN SODIUM 100 MG: 100 INJECTION, POWDER, LYOPHILIZED, FOR SOLUTION INTRAVENOUS at 08:04

## 2023-04-08 RX ADMIN — CARVEDILOL 12.5 MG: 12.5 TABLET, FILM COATED ORAL at 08:04

## 2023-04-08 RX ADMIN — AMLODIPINE BESYLATE 10 MG: 10 TABLET ORAL at 09:04

## 2023-04-08 RX ADMIN — CEFEPIME 2 G: 2 INJECTION, POWDER, FOR SOLUTION INTRAVENOUS at 02:04

## 2023-04-08 RX ADMIN — Medication 10 ML: at 12:04

## 2023-04-08 RX ADMIN — INSULIN ASPART 6 UNITS: 100 INJECTION, SOLUTION INTRAVENOUS; SUBCUTANEOUS at 05:04

## 2023-04-08 RX ADMIN — OXYCODONE HYDROCHLORIDE 5 MG: 5 TABLET ORAL at 03:04

## 2023-04-08 RX ADMIN — ATORVASTATIN CALCIUM 40 MG: 40 TABLET, FILM COATED ORAL at 08:04

## 2023-04-08 RX ADMIN — INSULIN ASPART 2 UNITS: 100 INJECTION, SOLUTION INTRAVENOUS; SUBCUTANEOUS at 12:04

## 2023-04-08 RX ADMIN — ACETAMINOPHEN 650 MG: 325 TABLET ORAL at 11:04

## 2023-04-08 RX ADMIN — FUROSEMIDE 20 MG: 20 TABLET ORAL at 09:04

## 2023-04-08 RX ADMIN — PANTOPRAZOLE SODIUM 40 MG: 40 TABLET, DELAYED RELEASE ORAL at 09:04

## 2023-04-08 RX ADMIN — OXYCODONE HYDROCHLORIDE 5 MG: 5 TABLET ORAL at 11:04

## 2023-04-08 RX ADMIN — INSULIN ASPART 4 UNITS: 100 INJECTION, SOLUTION INTRAVENOUS; SUBCUTANEOUS at 08:04

## 2023-04-08 RX ADMIN — ASPIRIN 81 MG: 81 TABLET, COATED ORAL at 09:04

## 2023-04-08 RX ADMIN — ACETAMINOPHEN 650 MG: 325 TABLET ORAL at 05:04

## 2023-04-08 RX ADMIN — DULOXETINE 30 MG: 30 CAPSULE, DELAYED RELEASE ORAL at 09:04

## 2023-04-08 NOTE — SUBJECTIVE & OBJECTIVE
Medications:  Continuous Infusions:  Scheduled Meds:   acetaminophen  650 mg Oral Q6H    amLODIPine  10 mg Oral Daily    ammonium lactate   Topical (Top) Daily    aspirin  81 mg Oral Daily    atorvastatin  40 mg Oral QHS    carvediloL  12.5 mg Oral BID    ceFEPime (MAXIPIME) IVPB  2 g Intravenous Q12H    clopidogreL  75 mg Oral Daily    DULoxetine  30 mg Oral BID    furosemide  20 mg Oral Daily    micafungin (MYCAMINE) IVPB  100 mg Intravenous Q24H    pantoprazole  40 mg Oral BID    sodium chloride 0.9%  10 mL Intravenous Q6H     PRN Meds:sodium chloride, sodium chloride, dextrose 10%, glucagon (human recombinant), insulin aspart U-100, ondansetron, oxyCODONE, oxymetazoline, sodium chloride 0.9%, Flushing PICC Protocol **AND** sodium chloride 0.9% **AND** sodium chloride 0.9%     Objective:     Vital Signs (Most Recent):  Temp: 98.4 °F (36.9 °C) (04/08/23 0739)  Pulse: 89 (04/08/23 0739)  Resp: 18 (04/08/23 0739)  BP: (!) 153/66 (04/08/23 0739)  SpO2: 96 % (04/08/23 0739)   Vital Signs (24h Range):  Temp:  [97.8 °F (36.6 °C)-99.1 °F (37.3 °C)] 98.4 °F (36.9 °C)  Pulse:  [78-91] 89  Resp:  [16-20] 18  SpO2:  [94 %-99 %] 96 %  BP: (110-155)/(51-71) 153/66         Physical Exam  Constitutional:       General: She is not in acute distress.     Appearance: She is not ill-appearing.   HENT:      Head: Normocephalic.      Mouth/Throat:      Mouth: Mucous membranes are moist.   Cardiovascular:      Rate and Rhythm: Normal rate.      Comments:   Left: AT, PT triphasic signal    Pulmonary:      Effort: Pulmonary effort is normal. No respiratory distress.   Abdominal:      Palpations: Abdomen is soft.   Musculoskeletal:         General: Normal range of motion.   Skin:     General: Skin is warm and dry.      Capillary Refill: Capillary refill takes less than 2 seconds.      Comments: Left groin incision with wet to dry dressing in place  WILTON drain in place   Neurological:      General: No focal deficit present.      Mental  Status: She is alert.       Significant Labs:  CBC:   Recent Labs   Lab 04/08/23  0532   WBC 11.81   RBC 2.92*   HGB 8.2*   HCT 24.9*      MCV 85   MCH 28.1   MCHC 32.9     CMP:   Recent Labs   Lab 04/08/23  0532   *   CALCIUM 8.2*   ALBUMIN 2.0*   PROT 5.8*      K 3.6   CO2 24      BUN 27*   CREATININE 0.7   ALKPHOS 60   ALT 9*   AST 17   BILITOT 0.7       Significant Diagnostics:  I have reviewed all pertinent imaging results/findings within the past 24 hours.

## 2023-04-08 NOTE — PROGRESS NOTES
Progress Note  Plastic Surgery    Admit Date: 3/29/2023  Post-operative Day: 4 Days Post-Op  Hospital Day: 11    SUBJECTIVE:     Follow-up For: Procedure(s) (LRB):  Angiogram Extremity Unilateral - possible endovascular intervention (Right)    Pt POD 8 from left rectus femoris flap for groin coverage, doing well, NAEO, remains with good pain control, afebrile, HD stable, tolerating diet.     Scheduled Meds:   acetaminophen  650 mg Oral Q6H    amLODIPine  10 mg Oral Daily    ammonium lactate   Topical (Top) Daily    aspirin  81 mg Oral Daily    atorvastatin  40 mg Oral QHS    carvediloL  12.5 mg Oral BID    ceFEPime (MAXIPIME) IVPB  2 g Intravenous Q12H    clopidogreL  75 mg Oral Daily    DULoxetine  30 mg Oral BID    furosemide  20 mg Oral Daily    micafungin (MYCAMINE) IVPB  100 mg Intravenous Q24H    pantoprazole  40 mg Oral BID    sodium chloride 0.9%  10 mL Intravenous Q6H     Continuous Infusions:  PRN Meds:sodium chloride, sodium chloride, dextrose 10%, glucagon (human recombinant), insulin aspart U-100, ondansetron, oxyCODONE, oxymetazoline, sodium chloride 0.9%, Flushing PICC Protocol **AND** sodium chloride 0.9% **AND** sodium chloride 0.9%    Review of patient's allergies indicates:   Allergen Reactions    Motrin [ibuprofen] Itching           OBJECTIVE:     Vital Signs (Most Recent)  Temp: 98.4 °F (36.9 °C) (04/08/23 0739)  Pulse: 89 (04/08/23 0739)  Resp: 18 (04/08/23 0739)  BP: (!) 153/66 (04/08/23 0739)  SpO2: 96 % (04/08/23 0739)    Vital Signs Range (Last 24H):  Temp:  [97.8 °F (36.6 °C)-99.1 °F (37.3 °C)]   Pulse:  [78-91]   Resp:  [16-20]   BP: (110-155)/(51-71)   SpO2:  [94 %-99 %]     I & O (Last 24H):  Intake/Output Summary (Last 24 hours) at 4/8/2023 4459  Last data filed at 4/8/2023 0563  Gross per 24 hour   Intake 770 ml   Output 2.5 ml   Net 767.5 ml     Physical Exam:  Gen: Aox3 NAD  LLE: left thigh dressing with strikethough, remainder c/d/I, no evidence of local fluid collection,  infection, WILTON drain in place with serosanguinous output     ASSESSMENT/PLAN:     Assessment: Doing well.    Plan: continue current treatment.  - Daily dressing change by nursing to left groin: xeroform, gauze, tape   - Follow up Dr. Smiley clinic 2 weeks after discharge

## 2023-04-08 NOTE — SUBJECTIVE & OBJECTIVE
Past Medical History:   Diagnosis Date    CHF (congestive heart failure)     Diabetes mellitus     DVT (deep venous thrombosis)     Hypertension     Miscarriage        Past Surgical History:   Procedure Laterality Date     femoral vascular surgery       ANGIOGRAPHY OF LOWER EXTREMITY Right 2/23/2023    Procedure: Angiogram Extremity Unilateral;  Surgeon: Danny Dunbar MD;  Location: [facility];  Service: Vascular;  Laterality: Right;  RN PHONE PREOP 2/20/2023----CK CONSENT    ANGIOGRAPHY OF LOWER EXTREMITY Right 4/4/2023    Procedure: Angiogram Extremity Unilateral - possible endovascular intervention;  Surgeon: René Simms MD;  Location: [facility] OR 2ND FLR;  Service: Vascular;  Laterality: Right;  19.6 mins.  740.81 mGy  73.3399 Gy.cm  58ml dye  local - 1ml    CREATION OF FEMORAL-FEMORAL ARTERY BYPASS WITH GRAFT Bilateral 3/2/2023    Procedure: CREATION, BYPASS, ARTERIAL, FEMORAL TO FEMORAL, USING GRAFT, LEFT FEMORAL ENDARTERECTOMY, LEFT ILIAC ARTERY STENT PLACEMENT, RIGHT LOWER EXTREMITY ANGIOGRAM AND RIGHT SFA  ANGIOPLASTY;  Surgeon: Danny Dunbar MD;  [facility];  Service: Vascular;  Laterality: Bilateral;   744.47 MGY  175.36 Gycm  Flouro time 20.5 mins      CREATION OF ILIOFEMORAL ARTERY BYPASS Right 3/20/2019    Procedure: CREATION, BYPASS, ARTERIAL, ILIAC TO FEMORAL, RIGHT LOWER EXTREMITY, RIGHT PROFUNDAPLASTY;  Surgeon: Danny Dunbar MD;  Location: [facility];  Service: Vascular;  Laterality: Right;  11:00AM START PER ANNE  RN PREOP 3/13/2019  ACT'S, CELL SAVER, GRAFTS, BOOK WATER---NEED H/P  CONSENT IN AM------HGBA1C    CREATION OF MUSCLE ROTATIONAL FLAP Left 3/31/2023    Procedure: CREATION, FLAP, MUSCLE ROTATION;  Surgeon: Brandon Smiley MD;  Location: [facility] OR 2ND FLR;  Service: Plastics;  Laterality: Left;    ESOPHAGOGASTRODUODENOSCOPY N/A 3/17/2023    Procedure: EGD (ESOPHAGOGASTRODUODENOSCOPY);  Surgeon: Gee Rodriguez MD;  Location: [facility] ENDO (2ND FLR);   Service: Endoscopy;  Laterality: N/A;    FOOT AMPUTATION THROUGH METATARSAL Right 3/4/2023    Procedure: AMPUTATION, FOOT, TRANSMETATARSAL;  Surgeon: Benjamin Godfrey DPM;  Location: [facility] OR [address] [place] [address];  Service: Podiatry;  Laterality: Right;    HYSTERECTOMY      ?unsure cervix present    INCISION AND DRAINAGE Right 2019    Procedure: Incision and Drainage - R groin washout, possible muscle flap;  Surgeon: Danny Dunbar MD;  [facility];  Service: Vascular;  Laterality: Right;  groin washout    INCISION AND DRAINAGE OF GROIN Right 5/3/2019    Procedure: Incision and Drainage R groin;  Surgeon: Danny Dunbar MD;  [facility];  Service: Vascular;  Laterality: Right;    WOUND DEBRIDEMENT Left 3/31/2023    Procedure: DEBRIDEMENT, WOUND;  Surgeon: René Simms MD;  [facility];  Service: Vascular;  Laterality: Left;       Review of patient's allergies indicates:   Allergen Reactions    Motrin [ibuprofen] Itching     Family History       Problem Relation (Age of Onset)    Diabetes Father    Hypertension Father          Tobacco Use    Smoking status: Former     Types: Cigarettes     Quit date:      Years since quittin.2    Smokeless tobacco: Never   Substance and Sexual Activity    Alcohol use: No    Drug use: No    Sexual activity: Never     Review of Systems   Constitutional:  Positive for fatigue. Negative for appetite change, chills and fever.   HENT:  Negative for congestion and trouble swallowing.    Eyes:  Negative for pain and redness.   Respiratory:  Negative for cough and shortness of breath.    Cardiovascular:  Negative for chest pain and palpitations.   Gastrointestinal:  Negative for abdominal pain, constipation, diarrhea, nausea and vomiting.   Genitourinary:  Negative for difficulty urinating and dysuria.   Musculoskeletal:  Negative for back pain and neck pain.   Skin:  Positive for wound. Negative for rash.   Neurological:  Positive  for weakness. Negative for dizziness, light-headedness and headaches.   Objective:     Vital Signs (Most Recent):  Temp: 98.4 °F (36.9 °C) (04/08/23 1604)  Pulse: 87 (04/08/23 1604)  Resp: 20 (04/08/23 1604)  BP: 136/63 (04/08/23 1604)  SpO2: 96 % (04/08/23 1604) Vital Signs (24h Range):  Temp:  [97.8 °F (36.6 °C)-99.1 °F (37.3 °C)] 98.4 °F (36.9 °C)  Pulse:  [83-91] 87  Resp:  [16-20] 20  SpO2:  [94 %-97 %] 96 %  BP: (116-155)/(53-71) 136/63     Weight: 67 kg (147 lb 11.3 oz) (04/04/23 0708)  Body mass index is 25.35 kg/m².      Intake/Output Summary (Last 24 hours) at 4/8/2023 1813  Last data filed at 4/8/2023 1730  Gross per 24 hour   Intake 876.71 ml   Output 0 ml   Net 876.71 ml       Lines/Drains/Airways       Peripherally Inserted Central Catheter Line  Duration             PICC Double Lumen 04/05/23 1107 right basilic 3 days              Drain  Duration                  Closed/Suction Drain 03/31/23 0953 Left;Anterior;Lateral Thigh Bulb 15 Fr. 8 days    Female External Urinary Catheter 04/03/23 1532 5 days                    Physical Exam  Constitutional:       Appearance: Normal appearance. She is overweight.   Eyes:      Conjunctiva/sclera: Conjunctivae normal.      Pupils: Pupils are equal, round, and reactive to light.   Cardiovascular:      Rate and Rhythm: Normal rate and regular rhythm.      Pulses: Normal pulses.      Heart sounds: Normal heart sounds.   Pulmonary:      Effort: Pulmonary effort is normal. No respiratory distress.      Breath sounds: Normal breath sounds.   Abdominal:      General: Bowel sounds are normal. There is no distension.      Palpations: Abdomen is soft.      Tenderness: There is no abdominal tenderness.      Comments: Black semi-formed stool present on rectal exam; no hemorrhoids or fissures present.    Skin:     General: Skin is warm and dry.      Findings: No bruising or rash.   Neurological:      Mental Status: She is alert and oriented to person, place, and time.        Significant Labs:  All pertinent lab results from the last 24 hours have been reviewed.    Significant Imaging:  Imaging results within the past 24 hours have been reviewed.

## 2023-04-08 NOTE — HPI
Ms. Nelsy Marino is an 82 year old female for whom GI is consulted with concern for melena. She has a PMH significant for DVT (on Apixaban), HFpEF, pulmonary hypertension, and peripheral vascular disease (status post lower angioplasty in early 03/2023).     Hospital Course: Patient was admitted to [facility] on 03/29 for management of dehiscence of left grown surgical wound. She underwent wash-out on 03/31 with surgical cultures positive for fungal organisms; antifungal and antibiotic therapy initiated. Patient anticipated to discharge to [facility]. Hospital course associated with onset of epistaxis on 04/06 with reported melena on 04/07. GI consulted at that time.     On bedside interview, history obtain with assistance of . He reports noticing patient experiencing black colored stools since approximately 02/2023. The patient denies symptom association with abdominal pain, nausea, vomiting, post-prandial pain, dysphagia, use of NSAID products or iron supplementation at home, and prior history of GI bleeding.     No prior endoscopic procedures on file.

## 2023-04-08 NOTE — NURSING
On call MD paged again. Return calll from Dr. Graham and she was informed of patient's continued nose bleeds and ordered nasal packing.Gretta RICHARDS charge nurse informed and reported that she will call ED to see if there is any packing there as our floor does not stock it.

## 2023-04-08 NOTE — NURSING
Nasal packing attempted three times and patient could not tolerate insertion of device. Nosebleed is less than before and is not actively bleeding at this time.Dr. Graham with surgery notified. No new orders given at this time.

## 2023-04-08 NOTE — ASSESSMENT & PLAN NOTE
Nelsy Marino is a 82 y.o. woman with history of HTN, T2DM, DVT, and PAD who underwent left to right fem to SFA bypass and right SFA angioplasty on 3/2/23 and was admitted until 3/20. She was seen by Dr. Dunbar in the office earlier today complaining of left rest pain and also found to have dehiscence of her left groin incision.  S/p wound washout and flip with plastics 3/31, LLE angio on 4/4.     - patient seen and examined  - continue aspirin and plavix, as ordered  - Eliquis held given persistent nose bleed  - Insulin SS  - Home meds  - Asa, plavix, statin  - Podiatry following for right TMA wound  - stable for discharge pending insurance authorization    ID Plan:  Recommendations / Plan:   Continue IV-micafungin 100mg Q24 (cxs +C.glabrata; +C.albicans)   Continue IV-Cefepime 2 g Q12h (renally dosed CrCl 34)  -- [Note: recent h/o R foot TMA wound dehiscence cx+ PSA (03/10/23), s/p cipro course for SSTI].   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   -- ID will schedule pt for f/u appt in ID clinic in 2-3wks    Dispo: LTAC accepted, has PICC for IV abx

## 2023-04-08 NOTE — PLAN OF CARE
Problem: Infection Progression (Sepsis/Septic Shock)  Goal: Absence of Infection Signs and Symptoms  Outcome: Ongoing, Progressing

## 2023-04-08 NOTE — PROGRESS NOTES
Patient has had large, dark tarry stool. Patient due to receive clopidogrel and aspirin.   Dr. Rubi notified. Stated ok to give both clopidogrel and aspirin.

## 2023-04-08 NOTE — NURSING
Patient AAOx4, VSS, denies pain, blood transfusion completed during shift, incision sites clean dry and intact, nose bleed x3 during shift, refused rapid rhino nasal packing due to intolerance of insertion, one small tarry stool during shift, morning labs drawn via PICC this morning, WILTON had no output with bulb suction maintained, free from falls or injury, tolerating diet,  at bedside, be dlow and locked with alarm activated, side rails upx3, call light and personal belongings in reach.

## 2023-04-08 NOTE — PROGRESS NOTES
[facility] - [facility]  Vascular Surgery  Progress Note    Patient Name: Nelsy Marino  MRN: [id number]  Admission Date: 3/29/2023  Primary Care Provider: Juan F Garay    Subjective:     Interval History: Patient seen and examined this morning.  at bedside. Nose bleeds throughout day and into night with drop in H/H. Responded appropriately to 1u pRBC. Wet-to-dry dressing to left groin. Awaiting disposition.     Post-Op Info:  Procedure(s) (LRB):  Angiogram Extremity Unilateral - possible endovascular intervention (Right)   4 Days Post-Op       Medications:  Continuous Infusions:  Scheduled Meds:   acetaminophen  650 mg Oral Q6H    amLODIPine  10 mg Oral Daily    ammonium lactate   Topical (Top) Daily    aspirin  81 mg Oral Daily    atorvastatin  40 mg Oral QHS    carvediloL  12.5 mg Oral BID    ceFEPime (MAXIPIME) IVPB  2 g Intravenous Q12H    clopidogreL  75 mg Oral Daily    DULoxetine  30 mg Oral BID    furosemide  20 mg Oral Daily    micafungin (MYCAMINE) IVPB  100 mg Intravenous Q24H    pantoprazole  40 mg Oral BID    sodium chloride 0.9%  10 mL Intravenous Q6H     PRN Meds:sodium chloride, sodium chloride, dextrose 10%, glucagon (human recombinant), insulin aspart U-100, ondansetron, oxyCODONE, oxymetazoline, sodium chloride 0.9%, Flushing PICC Protocol **AND** sodium chloride 0.9% **AND** sodium chloride 0.9%     Objective:     Vital Signs (Most Recent):  Temp: 98.4 °F (36.9 °C) (04/08/23 0739)  Pulse: 89 (04/08/23 0739)  Resp: 18 (04/08/23 0739)  BP: (!) 153/66 (04/08/23 0739)  SpO2: 96 % (04/08/23 0739)   Vital Signs (24h Range):  Temp:  [97.8 °F (36.6 °C)-99.1 °F (37.3 °C)] 98.4 °F (36.9 °C)  Pulse:  [78-91] 89  Resp:  [16-20] 18  SpO2:  [94 %-99 %] 96 %  BP: (110-155)/(51-71) 153/66         Physical Exam  Constitutional:       General: She is not in acute distress.     Appearance: She is not ill-appearing.   HENT:      Head: Normocephalic.      Mouth/Throat:      Mouth: Mucous membranes  are moist.   Cardiovascular:      Rate and Rhythm: Normal rate.      Comments:   Left: AT, PT triphasic signal    Pulmonary:      Effort: Pulmonary effort is normal. No respiratory distress.   Abdominal:      Palpations: Abdomen is soft.   Musculoskeletal:         General: Normal range of motion.   Skin:     General: Skin is warm and dry.      Capillary Refill: Capillary refill takes less than 2 seconds.      Comments: Left groin incision with wet to dry dressing in place  WILTON drain in place   Neurological:      General: No focal deficit present.      Mental Status: She is alert.       Significant Labs:  CBC:   Recent Labs   Lab 04/08/23  0532   WBC 11.81   RBC 2.92*   HGB 8.2*   HCT 24.9*      MCV 85   MCH 28.1   MCHC 32.9     CMP:   Recent Labs   Lab 04/08/23  0532   *   CALCIUM 8.2*   ALBUMIN 2.0*   PROT 5.8*      K 3.6   CO2 24      BUN 27*   CREATININE 0.7   ALKPHOS 60   ALT 9*   AST 17   BILITOT 0.7       Significant Diagnostics:  I have reviewed all pertinent imaging results/findings within the past 24 hours.    Assessment/Plan:     PVD (peripheral vascular disease)  Nelsy Marino is a 82 y.o. woman with history of HTN, T2DM, DVT, and PAD who underwent left to right fem to SFA bypass and right SFA angioplasty on 3/2/23 and was admitted until 3/20. She was seen by Dr. Dunbar in the office earlier today complaining of left rest pain and also found to have dehiscence of her left groin incision.  S/p wound washout and flip with plastics 3/31, LLE angio on 4/4.     - patient seen and examined  - continue aspirin and plavix, as ordered  - Eliquis held given persistent nose bleed  - Insulin SS  - Home meds  - Asa, plavix, statin  - Podiatry following for right TMA wound  - stable for discharge pending insurance authorization    ID Plan:  Recommendations / Plan:   Continue IV-micafungin 100mg Q24 (cxs +C.glabrata; +C.albicans)   Continue IV-Cefepime 2 g Q12h (renally dosed CrCl 34)  --  [Note: recent h/o R foot TMA wound dehiscence cx+ PSA (03/10/23), s/p cipro course for SSTI].   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   -- ID will schedule pt for f/u appt in ID clinic in 2-3wks    Dispo: LTAC accepted, has PICC for IV abx        Freddy Murillo MD  Vascular Surgery  [facility]

## 2023-04-08 NOTE — NURSING
Patient still having moderate bright red bloody nose bleeds after using afrin. On call MD paged and waiting on return call.

## 2023-04-08 NOTE — CONSULTS
[facility]  Gastroenterology  Consult Note    Patient Name: Nelsy Marino  MRN: [id number]  Admission Date: 3/29/2023  Hospital Length of Stay: 10 days  Code Status: Full Code   Attending Provider: Danny Dunbar MD   Consulting Provider: Dusty Ulrich MD  Primary Care Physician: Juan F Garay  Principal Problem:Wound dehiscence    Inpatient consult to Gastroenterology  Consult performed by: Dusty Ulrich MD  Consult ordered by: Ramana Navarro MD        Subjective:     HPI:  Ms. Nelsy Marino is an 82 year old female for whom GI is consulted with concern for melena. She has a PMH significant for DVT (on Apixaban), HFpEF, pulmonary hypertension, and peripheral vascular disease (status post lower angioplasty in early 03/2023).     Hospital Course: Patient was admitted to [facility] on 03/29 for management of dehiscence of left grown surgical wound. She underwent wash-out on 03/31 with surgical cultures positive for fungal organisms; antifungal and antibiotic therapy initiated. Patient anticipated to discharge to [facility]. Hospital course associated with onset of epistaxis on 04/06 with reported melena on 04/07. GI consulted at that time.     On bedside interview, history obtain with assistance of . He reports noticing patient experiencing black colored stools since approximately 02/2023. The patient denies symptom association with abdominal pain, nausea, vomiting, post-prandial pain, dysphagia, use of NSAID products or iron supplementation at home, and prior history of GI bleeding.     No prior endoscopic procedures on file.       Past Medical History:   Diagnosis Date    CHF (congestive heart failure)     Diabetes mellitus     DVT (deep venous thrombosis)     Hypertension     Miscarriage        Past Surgical History:   Procedure Laterality Date     femoral vascular surgery       ANGIOGRAPHY OF LOWER EXTREMITY Right 2/23/2023    Procedure: Angiogram Extremity Unilateral;  Surgeon:  Danny Dunbar MD;  Location: [facility];  Service: Vascular;  Laterality: Right;  RN PHONE PREOP 2/20/2023----CK CONSENT    ANGIOGRAPHY OF LOWER EXTREMITY Right 4/4/2023    Procedure: Angiogram Extremity Unilateral - possible endovascular intervention;  Surgeon: René Simms MD;  [facility];  Service: Vascular;  Laterality: Right;  19.6 mins.  740.81 mGy  73.3399 Gy.cm  58ml dye  local - 1ml    CREATION OF FEMORAL-FEMORAL ARTERY BYPASS WITH GRAFT Bilateral 3/2/2023    Procedure: CREATION, BYPASS, ARTERIAL, FEMORAL TO FEMORAL, USING GRAFT, LEFT FEMORAL ENDARTERECTOMY, LEFT ILIAC ARTERY STENT PLACEMENT, RIGHT LOWER EXTREMITY ANGIOGRAM AND RIGHT SFA  ANGIOPLASTY;  Surgeon: Danny Dunbar MD;  [facility];  Service: Vascular;  Laterality: Bilateral;   744.47 MGY  175.36 Gycm  Flouro time 20.5 mins      CREATION OF ILIOFEMORAL ARTERY BYPASS Right 3/20/2019    Procedure: CREATION, BYPASS, ARTERIAL, ILIAC TO FEMORAL, RIGHT LOWER EXTREMITY, RIGHT PROFUNDAPLASTY;  Surgeon: Danny Dunbar MD;  Location: [facility];  Service: Vascular;  Laterality: Right;  11:00AM START PER ANNE  RN PREOP 3/13/2019  ACT'S, CELL SAVER, GRAFTS, BOOK WATER---NEED H/P  CONSENT IN AM------HGBA1C    CREATION OF MUSCLE ROTATIONAL FLAP Left 3/31/2023    Procedure: CREATION, FLAP, MUSCLE ROTATION;  Surgeon: Brandon Smiley MD;  Location: [facility] OR 2ND FLR;  Service: Plastics;  Laterality: Left;    ESOPHAGOGASTRODUODENOSCOPY N/A 3/17/2023    Procedure: EGD (ESOPHAGOGASTRODUODENOSCOPY);  Surgeon: Gee Rodriguez MD;  Location: [facility] ENDO (2ND FLR);  Service: Endoscopy;  Laterality: N/A;    FOOT AMPUTATION THROUGH METATARSAL Right 3/4/2023    Procedure: AMPUTATION, FOOT, TRANSMETATARSAL;  Surgeon: Benjamin Godfrey DPM;  Location: [facility] OR 2ND FLR;  Service: Podiatry;  Laterality: Right;    HYSTERECTOMY      ?unsure cervix present    INCISION AND DRAINAGE Right 4/12/2019    Procedure:  Incision and Drainage - R groin washout, possible muscle flap;  Surgeon: Danny Dunbar MD;  Location: [facility] OR 2ND FLR;  Service: Vascular;  Laterality: Right;  groin washout    INCISION AND DRAINAGE OF GROIN Right 5/3/2019    Procedure: Incision and Drainage R groin;  Surgeon: Danny Dunbar MD;  Location: [facility] OR 2ND FLR;  Service: Vascular;  Laterality: Right;    WOUND DEBRIDEMENT Left 3/31/2023    Procedure: DEBRIDEMENT, WOUND;  Surgeon: René Simms MD;  Location: [facility] OR 2ND FLR;  Service: Vascular;  Laterality: Left;       Review of patient's allergies indicates:   Allergen Reactions    Motrin [ibuprofen] Itching     Family History       Problem Relation (Age of Onset)    Diabetes Father    Hypertension Father          Tobacco Use    Smoking status: Former     Types: Cigarettes     Quit date:      Years since quittin.2    Smokeless tobacco: Never   Substance and Sexual Activity    Alcohol use: No    Drug use: No    Sexual activity: Never     Review of Systems   Constitutional:  Positive for fatigue. Negative for appetite change, chills and fever.   HENT:  Negative for congestion and trouble swallowing.    Eyes:  Negative for pain and redness.   Respiratory:  Negative for cough and shortness of breath.    Cardiovascular:  Negative for chest pain and palpitations.   Gastrointestinal:  Negative for abdominal pain, constipation, diarrhea, nausea and vomiting.   Genitourinary:  Negative for difficulty urinating and dysuria.   Musculoskeletal:  Negative for back pain and neck pain.   Skin:  Positive for wound. Negative for rash.   Neurological:  Positive for weakness. Negative for dizziness, light-headedness and headaches.   Objective:     Vital Signs (Most Recent):  Temp: 98.4 °F (36.9 °C) (23 1604)  Pulse: 87 (23 1604)  Resp: 20 (23 1604)  BP: 136/63 (23 1604)  SpO2: 96 % (23 1604) Vital Signs (24h Range):  Temp:  [97.8 °F (36.6 °C)-99.1 °F (37.3 °C)]  98.4 °F (36.9 °C)  Pulse:  [83-91] 87  Resp:  [16-20] 20  SpO2:  [94 %-97 %] 96 %  BP: (116-155)/(53-71) 136/63     Weight: 67 kg (147 lb 11.3 oz) (04/04/23 0708)  Body mass index is 25.35 kg/m².      Intake/Output Summary (Last 24 hours) at 4/8/2023 1813  Last data filed at 4/8/2023 1730  Gross per 24 hour   Intake 876.71 ml   Output 0 ml   Net 876.71 ml       Lines/Drains/Airways       Peripherally Inserted Central Catheter Line  Duration             PICC Double Lumen 04/05/23 1107 right basilic 3 days              Drain  Duration                  Closed/Suction Drain 03/31/23 0953 Left;Anterior;Lateral Thigh Bulb 15 Fr. 8 days    Female External Urinary Catheter 04/03/23 1532 5 days                    Physical Exam  Constitutional:       Appearance: Normal appearance. She is overweight.   Eyes:      Conjunctiva/sclera: Conjunctivae normal.      Pupils: Pupils are equal, round, and reactive to light.   Cardiovascular:      Rate and Rhythm: Normal rate and regular rhythm.      Pulses: Normal pulses.      Heart sounds: Normal heart sounds.   Pulmonary:      Effort: Pulmonary effort is normal. No respiratory distress.      Breath sounds: Normal breath sounds.   Abdominal:      General: Bowel sounds are normal. There is no distension.      Palpations: Abdomen is soft.      Tenderness: There is no abdominal tenderness.      Comments: Black semi-formed stool present on rectal exam; no hemorrhoids or fissures present.    Skin:     General: Skin is warm and dry.      Findings: No bruising or rash.   Neurological:      Mental Status: She is alert and oriented to person, place, and time.       Significant Labs:  All pertinent lab results from the last 24 hours have been reviewed.    Significant Imaging:  Imaging results within the past 24 hours have been reviewed.    Assessment/Plan:     Oncology  Acute on chronic anemia  This is an 82 year old female with PMH significant for DVT (on Apixaban), HFpEF, pulmonary  hypertension, and peripheral vascular disease (status post lower angioplasty in early 03/2023 and on ASA and PLAVIX) who was admitted to [facility] on 03/29 for management of dehiscence and infection of left grown surgical wound secondary to fungal infection. Hospital course associated with onset of epistaxis and reported GI bleeding with melena on 04/07 that has reportedly been on-going since at least 02/2023 per family. Based on chart review, patient noted to have Hgb of 9-10 since 2019 with downtrend to 7-8 since 03/2023 during hospitalization for angioplasty. Given progressive anemia and presence of melena on exam, endoscopic investigation warranted.     Our recommendations are as follows:      Differential diagnoses for presentation include possible gastric erosions in the setting of DAPT usage versus esophagitis.    Maintain IV access with at least 2 large IVs (18 gauge or larger)   Continued IVF resuscitation as tolerated with attention to active co-morbidities.    Give PROTONIX 40 mg IV BID.    RBC transfusion as indicated if Hgb <7 g/dL.    Please correct any coagulopathy with platelets and FFP to a goal of platelets >50K and INR <2.0.    Discontinue all antiplatelet, anti-coagulation, and NSAID products if possible.    Tentatively plan for EGD on 04/10. Please make NPO for that time.             Thank you for your consult. I will follow-up with patient. Please contact us if you have any additional questions.    Dusty Ulrich MD  Gastroenterology  [facility]

## 2023-04-08 NOTE — ASSESSMENT & PLAN NOTE
This is an 82 year old female with PMH significant for DVT (on Apixaban), HFpEF, pulmonary hypertension, and peripheral vascular disease (status post lower angioplasty in early 03/2023 and on ASA and PLAVIX) who was admitted to [facility] on 03/29 for management of dehiscence and infection of left grown surgical wound secondary to fungal infection. Hospital course associated with onset of epistaxis and reported GI bleeding with melena on 04/07 that has reportedly been on-going since at least 02/2023 per family. Based on chart review, patient noted to have Hgb of 9-10 since 2019 with downtrend to 7-8 since 03/2023 during hospitalization for angioplasty. Given progressive anemia and presence of melena on exam, endoscopic investigation warranted.     Our recommendations are as follows:      Differential diagnoses for presentation include possible gastric erosions in the setting of DAPT usage versus esophagitis.    Maintain IV access with at least 2 large IVs (18 gauge or larger)   Continued IVF resuscitation as tolerated with attention to active co-morbidities.    Give PROTONIX 40 mg IV BID.    RBC transfusion as indicated if Hgb <7 g/dL.    Please correct any coagulopathy with platelets and FFP to a goal of platelets >50K and INR <2.0.    Discontinue all antiplatelet, anti-coagulation, and NSAID products if possible.    Tentatively plan for EGD on 04/10. Please make NPO for that time.

## 2023-04-08 NOTE — PROGRESS NOTES
Progress Note  Plastic Surgery    Admit Date: 3/29/2023  Post-operative Day: 3 Days Post-Op  Hospital Day: 10    SUBJECTIVE:     Follow-up For: Procedure(s) (LRB):  Angiogram Extremity Unilateral - possible endovascular intervention (Right)    Pt POD 7 from left rectus femoris flap for groin coverage, doing well, NAEO, remains with good pain control, afebrile, HD stable, tolerating diet. Wound vac in place good suction    Scheduled Meds:   acetaminophen  650 mg Oral Q6H    amLODIPine  10 mg Oral Daily    ammonium lactate   Topical (Top) Daily    aspirin  81 mg Oral Daily    atorvastatin  40 mg Oral QHS    carvediloL  12.5 mg Oral BID    ceFEPime (MAXIPIME) IVPB  2 g Intravenous Q12H    clopidogreL  75 mg Oral Daily    DULoxetine  30 mg Oral BID    furosemide  20 mg Oral Daily    micafungin (MYCAMINE) IVPB  100 mg Intravenous Q24H    pantoprazole  40 mg Oral BID    sodium chloride 0.9%  10 mL Intravenous Q6H     Continuous Infusions:  PRN Meds:sodium chloride, sodium chloride, dextrose 10%, glucagon (human recombinant), insulin aspart U-100, ondansetron, oxyCODONE, oxymetazoline, sodium chloride 0.9%, Flushing PICC Protocol **AND** sodium chloride 0.9% **AND** sodium chloride 0.9%    Review of patient's allergies indicates:   Allergen Reactions    Motrin [ibuprofen] Itching         OBJECTIVE:     Vital Signs (Most Recent)  Temp: 98.5 °F (36.9 °C) (04/07/23 1740)  Pulse: 85 (04/07/23 1740)  Resp: 16 (04/07/23 1740)  BP: (!) 125/55 (04/07/23 1740)  SpO2: 97 % (04/07/23 1740)    Vital Signs Range (Last 24H):  Temp:  [98.2 °F (36.8 °C)-98.5 °F (36.9 °C)]   Pulse:  [78-87]   Resp:  [16-20]   BP: (110-153)/(51-68)   SpO2:  [91 %-99 %]     I & O (Last 24H):  Intake/Output Summary (Last 24 hours) at 4/7/2023 1907  Last data filed at 4/7/2023 1603  Gross per 24 hour   Intake 859.7 ml   Output 7.5 ml   Net 852.2 ml     Physical Exam:  Gen: Aox3 NAD  LLE: wound vac removed, well approximated thigh incision, no evidence of  infection or fluid collection, left groin muscle flap seen healthy and viable, no active bleeding    ASSESSMENT/PLAN:     Assessment: 82 F POD 7 from left rectus femoris flap doing well    Plan:   - Daily wet to dry dressing change to left groin muscle flap vaseline gauze or xeroform then gauze and then tape   - Return to see Dr. Smiley in clinic in 2 week for planning of skin graft     Connor Hall PRS Fellow

## 2023-04-08 NOTE — PLAN OF CARE
POC reviewed with patient who verbalized understanding. VSS on RA. AAOX4. Remains free of falls and injury. Frequent weight shift encouraged with assistance provided as needed    - persistent nose bleeds throughout shift, Dr. Mendez notified,  PRN Afrin nasal spray ordered and provided moderate relief,   - several loose, tarry black stools throughout shift, Dr. Navarro notified, repeat CBC and GI consult ordered  - R groin w/staples  - R foot wrapped with ACE wrap  - L groin to thigh wound vac removed by MD bedside  - L groin w/gauze and tape  - L thigh WILTON drain w/minimal sanguinous drainage  - R PICC  - IV ABX  - Blood glucose being monitored every 6 hours with coverage needed    Tolerating diet, little appetite, denies nausea. Pain controlled with scheduled medications per MAR. Educated on IS use. Patient denies chest pain & SOB. No acute events. No distress noted. Bed in lowest position, call light within reach, frequent rounds made for safety.      Problem: Adult Inpatient Plan of Care  Goal: Plan of Care Review  Outcome: Ongoing, Progressing  Goal: Patient-Specific Goal (Individualized)  Outcome: Ongoing, Progressing  Goal: Absence of Hospital-Acquired Illness or Injury  Outcome: Ongoing, Progressing  Goal: Optimal Comfort and Wellbeing  Outcome: Ongoing, Progressing     Problem: Diabetes Comorbidity  Goal: Blood Glucose Level Within Targeted Range  Outcome: Ongoing, Progressing     Problem: Skin Injury Risk Increased  Goal: Skin Health and Integrity  Outcome: Ongoing, Progressing     Problem: Fall Injury Risk  Goal: Absence of Fall and Fall-Related Injury  Outcome: Ongoing, Progressing     Problem: Infection  Goal: Absence of Infection Signs and Symptoms  Outcome: Ongoing, Progressing

## 2023-04-09 LAB
ALBUMIN SERPL BCP-MCNC: 1.9 G/DL (ref 3.5–5.2)
ALP SERPL-CCNC: 59 U/L (ref 55–135)
ALT SERPL W/O P-5'-P-CCNC: 7 U/L (ref 10–44)
ANION GAP SERPL CALC-SCNC: 8 MMOL/L (ref 8–16)
AST SERPL-CCNC: 16 U/L (ref 10–40)
BACTERIA SPEC AEROBE CULT: ABNORMAL
BASOPHILS # BLD AUTO: 0.02 K/UL (ref 0–0.2)
BASOPHILS NFR BLD: 0.2 % (ref 0–1.9)
BILIRUB SERPL-MCNC: 0.6 MG/DL (ref 0.1–1)
BUN SERPL-MCNC: 15 MG/DL (ref 8–23)
CALCIUM SERPL-MCNC: 8.4 MG/DL (ref 8.7–10.5)
CHLORIDE SERPL-SCNC: 107 MMOL/L (ref 95–110)
CO2 SERPL-SCNC: 23 MMOL/L (ref 23–29)
CREAT SERPL-MCNC: 0.7 MG/DL (ref 0.5–1.4)
DIFFERENTIAL METHOD: ABNORMAL
EOSINOPHIL # BLD AUTO: 0.4 K/UL (ref 0–0.5)
EOSINOPHIL NFR BLD: 4.3 % (ref 0–8)
ERYTHROCYTE [DISTWIDTH] IN BLOOD BY AUTOMATED COUNT: 15.9 % (ref 11.5–14.5)
EST. GFR  (NO RACE VARIABLE): >60 ML/MIN/1.73 M^2
GLUCOSE SERPL-MCNC: 134 MG/DL (ref 70–110)
HCT VFR BLD AUTO: 24.9 % (ref 37–48.5)
HGB BLD-MCNC: 7.8 G/DL (ref 12–16)
IMM GRANULOCYTES # BLD AUTO: 0.04 K/UL (ref 0–0.04)
IMM GRANULOCYTES NFR BLD AUTO: 0.4 % (ref 0–0.5)
LYMPHOCYTES # BLD AUTO: 1.4 K/UL (ref 1–4.8)
LYMPHOCYTES NFR BLD: 15.1 % (ref 18–48)
MCH RBC QN AUTO: 27.1 PG (ref 27–31)
MCHC RBC AUTO-ENTMCNC: 31.3 G/DL (ref 32–36)
MCV RBC AUTO: 87 FL (ref 82–98)
MONOCYTES # BLD AUTO: 0.6 K/UL (ref 0.3–1)
MONOCYTES NFR BLD: 6 % (ref 4–15)
NEUTROPHILS # BLD AUTO: 6.8 K/UL (ref 1.8–7.7)
NEUTROPHILS NFR BLD: 74 % (ref 38–73)
NRBC BLD-RTO: 0 /100 WBC
PLATELET # BLD AUTO: 194 K/UL (ref 150–450)
PMV BLD AUTO: 10.8 FL (ref 9.2–12.9)
POCT GLUCOSE: 124 MG/DL (ref 70–110)
POCT GLUCOSE: 144 MG/DL (ref 70–110)
POCT GLUCOSE: 176 MG/DL (ref 70–110)
POCT GLUCOSE: 199 MG/DL (ref 70–110)
POCT GLUCOSE: 212 MG/DL (ref 70–110)
POTASSIUM SERPL-SCNC: 3.5 MMOL/L (ref 3.5–5.1)
PROT SERPL-MCNC: 5.9 G/DL (ref 6–8.4)
RBC # BLD AUTO: 2.88 M/UL (ref 4–5.4)
SODIUM SERPL-SCNC: 138 MMOL/L (ref 136–145)
WBC # BLD AUTO: 9.23 K/UL (ref 3.9–12.7)

## 2023-04-09 PROCEDURE — 25000003 PHARM REV CODE 250: Performed by: SURGERY

## 2023-04-09 PROCEDURE — C9113 INJ PANTOPRAZOLE SODIUM, VIA: HCPCS | Performed by: STUDENT IN AN ORGANIZED HEALTH CARE EDUCATION/TRAINING PROGRAM

## 2023-04-09 PROCEDURE — 25000003 PHARM REV CODE 250: Performed by: STUDENT IN AN ORGANIZED HEALTH CARE EDUCATION/TRAINING PROGRAM

## 2023-04-09 PROCEDURE — 63600175 PHARM REV CODE 636 W HCPCS: Performed by: STUDENT IN AN ORGANIZED HEALTH CARE EDUCATION/TRAINING PROGRAM

## 2023-04-09 PROCEDURE — 97530 THERAPEUTIC ACTIVITIES: CPT

## 2023-04-09 PROCEDURE — 80053 COMPREHEN METABOLIC PANEL: CPT | Performed by: STUDENT IN AN ORGANIZED HEALTH CARE EDUCATION/TRAINING PROGRAM

## 2023-04-09 PROCEDURE — A4216 STERILE WATER/SALINE, 10 ML: HCPCS | Performed by: SURGERY

## 2023-04-09 PROCEDURE — 85025 COMPLETE CBC W/AUTO DIFF WBC: CPT

## 2023-04-09 PROCEDURE — 97110 THERAPEUTIC EXERCISES: CPT

## 2023-04-09 PROCEDURE — 20600001 HC STEP DOWN PRIVATE ROOM

## 2023-04-09 RX ORDER — POTASSIUM CHLORIDE 7.45 MG/ML
10 INJECTION INTRAVENOUS
Status: COMPLETED | OUTPATIENT
Start: 2023-04-09 | End: 2023-04-09

## 2023-04-09 RX ADMIN — ACETAMINOPHEN 650 MG: 325 TABLET ORAL at 05:04

## 2023-04-09 RX ADMIN — POTASSIUM CHLORIDE 10 MEQ: 7.46 INJECTION, SOLUTION INTRAVENOUS at 07:04

## 2023-04-09 RX ADMIN — DULOXETINE 30 MG: 30 CAPSULE, DELAYED RELEASE ORAL at 08:04

## 2023-04-09 RX ADMIN — Medication: at 02:04

## 2023-04-09 RX ADMIN — CARVEDILOL 12.5 MG: 12.5 TABLET, FILM COATED ORAL at 08:04

## 2023-04-09 RX ADMIN — POTASSIUM CHLORIDE 10 MEQ: 7.46 INJECTION, SOLUTION INTRAVENOUS at 12:04

## 2023-04-09 RX ADMIN — CLOPIDOGREL BISULFATE 75 MG: 75 TABLET ORAL at 08:04

## 2023-04-09 RX ADMIN — Medication 10 ML: at 12:04

## 2023-04-09 RX ADMIN — CEFEPIME 2 G: 2 INJECTION, POWDER, FOR SOLUTION INTRAVENOUS at 02:04

## 2023-04-09 RX ADMIN — POTASSIUM CHLORIDE 10 MEQ: 7.46 INJECTION, SOLUTION INTRAVENOUS at 11:04

## 2023-04-09 RX ADMIN — MICAFUNGIN SODIUM 100 MG: 100 INJECTION, POWDER, LYOPHILIZED, FOR SOLUTION INTRAVENOUS at 08:04

## 2023-04-09 RX ADMIN — ASPIRIN 81 MG: 81 TABLET, COATED ORAL at 08:04

## 2023-04-09 RX ADMIN — ATORVASTATIN CALCIUM 40 MG: 40 TABLET, FILM COATED ORAL at 08:04

## 2023-04-09 RX ADMIN — OXYCODONE HYDROCHLORIDE 5 MG: 5 TABLET ORAL at 02:04

## 2023-04-09 RX ADMIN — POTASSIUM CHLORIDE 10 MEQ: 7.46 INJECTION, SOLUTION INTRAVENOUS at 09:04

## 2023-04-09 RX ADMIN — INSULIN ASPART 4 UNITS: 100 INJECTION, SOLUTION INTRAVENOUS; SUBCUTANEOUS at 12:04

## 2023-04-09 RX ADMIN — FUROSEMIDE 20 MG: 20 TABLET ORAL at 08:04

## 2023-04-09 RX ADMIN — CEFEPIME 2 G: 2 INJECTION, POWDER, FOR SOLUTION INTRAVENOUS at 01:04

## 2023-04-09 RX ADMIN — ACETAMINOPHEN 650 MG: 325 TABLET ORAL at 11:04

## 2023-04-09 RX ADMIN — PANTOPRAZOLE SODIUM 40 MG: 40 INJECTION, POWDER, FOR SOLUTION INTRAVENOUS at 09:04

## 2023-04-09 RX ADMIN — Medication 10 ML: at 06:04

## 2023-04-09 RX ADMIN — ACETAMINOPHEN 650 MG: 325 TABLET ORAL at 12:04

## 2023-04-09 RX ADMIN — INSULIN ASPART 2 UNITS: 100 INJECTION, SOLUTION INTRAVENOUS; SUBCUTANEOUS at 05:04

## 2023-04-09 RX ADMIN — Medication 10 ML: at 05:04

## 2023-04-09 RX ADMIN — PANTOPRAZOLE SODIUM 40 MG: 40 INJECTION, POWDER, FOR SOLUTION INTRAVENOUS at 08:04

## 2023-04-09 RX ADMIN — AMLODIPINE BESYLATE 10 MG: 10 TABLET ORAL at 08:04

## 2023-04-09 RX ADMIN — OXYCODONE HYDROCHLORIDE 5 MG: 5 TABLET ORAL at 08:04

## 2023-04-09 NOTE — NURSING
Patient AAOx4, VSS, denies pain, incision sites clean dry and intact, nose bleed x1 during shift,  morning labs drawn via PICC this morning, WILTON had no output with bulb suction maintained, free from falls or injury, tolerating diet,  at bedside, bed low and locked with alarm activated, side rails upx3, call light and personal belongings in reach.

## 2023-04-09 NOTE — TREATMENT PLAN
Gastroenterology Treatment Plan    Nelsy Marino is a 82 y.o. female admitted to hospital 3/29/2023 (Hospital Day: 12) due to Wound dehiscence.     Interval History  Epistaxis reported overnight per nursing notes. Vital sign stable on room air. Labs notable for Hgb 7.8 from 8.2 yesterday and decreasing BUN (15 from 27) .    Objective  Temp:  [98 °F (36.7 °C)-99.3 °F (37.4 °C)] 98.5 °F (36.9 °C) (04/09 1246)  Pulse:  [81-91] 81 (04/09 1246)  BP: (117-144)/(59-67) 117/59 (04/09 1246)  Resp:  [16-20] 16 (04/09 0801)  SpO2:  [93 %-99 %] 97 % (04/09 1246)    Laboratory    Recent Labs   Lab 04/07/23  1413 04/08/23  0532 04/09/23  0517   HGB 6.7* 8.2* 7.8*       Assessment and Plan  This is an 82 year old female with PMH significant for DVT (on Apixaban), HFpEF, pulmonary hypertension, and peripheral vascular disease (status post lower angioplasty in early 03/2023 and on ASA and PLAVIX) who was admitted to [facility] on 03/29 for management of dehiscence and infection of left grown surgical wound secondary to fungal infection. Hospital course associated with onset of epistaxis and reported GI bleeding with melena on 04/07 that has reportedly been on-going since at least 02/2023 per family. Based on chart review, patient noted to have Hgb of 9-10 since 2019 with downtrend to 7-8 since 03/2023 during hospitalization for angioplasty. Given progressive anemia and presence of melena on exam, endoscopic investigation warranted.      Our recommendations are as follows:      Differential diagnoses for presentation include possible gastric erosions in the setting of DAPT usage versus esophagitis.   Maintain IV access with at least 2 large IVs (18 gauge or larger)  Continued IVF resuscitation as tolerated with attention to active co-morbidities.   Give PROTONIX 40 mg IV BID.   RBC transfusion as indicated if Hgb <7 g/dL.   Please correct any coagulopathy with platelets and FFP to a goal of platelets >50K and INR <2.0.   Discontinue  all antiplatelet, anti-coagulation, and NSAID products if possible.   Due to overall stability and current case load, will need to post-pone EGD until 04/11. Please make NPO for that time.     Thank you for involving us in the care of Nelsy Marino. Please call with any additional questions, concerns or changes in the patient's clinical status.        Dusty Ulrich MD, PGY-V  Gastroenterology Fellow  [facility]

## 2023-04-09 NOTE — PLAN OF CARE
No acute changes this shift, Patient A/Ox4, on room air, VSS.  Dressing care completed per orders. Patient and family declined getting out of bed to chair today but she has sat at edge of bed for meals.  Education given on importance of early mobilization and family is agreeable to move to chair tomorrow. Family is at bedside with patient. Will continue with plan of care.       Problem: Adult Inpatient Plan of Care  Goal: Plan of Care Review  Outcome: Ongoing, Progressing  Goal: Patient-Specific Goal (Individualized)  Outcome: Ongoing, Progressing  Goal: Absence of Hospital-Acquired Illness or Injury  Outcome: Ongoing, Progressing  Goal: Optimal Comfort and Wellbeing  Outcome: Ongoing, Progressing  Goal: Readiness for Transition of Care  Outcome: Ongoing, Progressing     Problem: Diabetes Comorbidity  Goal: Blood Glucose Level Within Targeted Range  Outcome: Ongoing, Progressing     Problem: Adjustment to Illness (Sepsis/Septic Shock)  Goal: Optimal Coping  Outcome: Ongoing, Progressing     Problem: Bleeding (Sepsis/Septic Shock)  Goal: Absence of Bleeding  Outcome: Ongoing, Progressing     Problem: Glycemic Control Impaired (Sepsis/Septic Shock)  Goal: Blood Glucose Level Within Desired Range  Outcome: Ongoing, Progressing     Problem: Infection Progression (Sepsis/Septic Shock)  Goal: Absence of Infection Signs and Symptoms  Outcome: Ongoing, Progressing     Problem: Nutrition Impaired (Sepsis/Septic Shock)  Goal: Optimal Nutrition Intake  Outcome: Ongoing, Progressing     Problem: Skin Injury Risk Increased  Goal: Skin Health and Integrity  Outcome: Ongoing, Progressing     Problem: Fall Injury Risk  Goal: Absence of Fall and Fall-Related Injury  Outcome: Ongoing, Progressing     Problem: Infection  Goal: Absence of Infection Signs and Symptoms  Outcome: Ongoing, Progressing

## 2023-04-09 NOTE — ASSESSMENT & PLAN NOTE
Nelsy Marino is a 82 y.o. woman with history of HTN, T2DM, DVT, and PAD who underwent left to right fem to SFA bypass and right SFA angioplasty on 3/2/23 and was admitted until 3/20. She was seen by Dr. Dunbar in the office earlier today complaining of left rest pain and also found to have dehiscence of her left groin incision.  S/p wound washout and flip with plastics 3/31, LLE angio on 4/4.     - patient seen and examined  - continues to endorse left foot pain despite triphasic distal signals  - continue aspirin and plavix, as ordered  - Eliquis held given persistent nose bleed  - Insulin SS  - Home meds  - Asa, plavix, statin  - Podiatry following for right TMA wound  - stable for discharge pending insurance authorization    ID Plan:  Recommendations / Plan:   Continue IV-micafungin 100mg Q24 (cxs +C.glabrata; +C.albicans)   Continue IV-Cefepime 2 g Q12h (renally dosed CrCl 34)  -- [Note: recent h/o R foot TMA wound dehiscence cx+ PSA (03/10/23), s/p cipro course for SSTI].   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   -- ID will schedule pt for f/u appt in ID clinic in 2-3wks    Dispo: LTAC accepted, has PICC for IV abx

## 2023-04-09 NOTE — PROGRESS NOTES
[facility] - [facility]  Vascular Surgery  Progress Note    Patient Name: Nelsy Marino  MRN: [id number]  Admission Date: 3/29/2023  Primary Care Provider: Juan F Garay    Subjective:     Interval History: Patient seen and examined. No nose bleeds overnight-- eliquis discontinued due to intolerance. GI planning for repeat EGD. Disposition planning.     Post-Op Info:  Procedure(s) (LRB):  Angiogram Extremity Unilateral - possible endovascular intervention (Right)   5 Days Post-Op       Medications:  Continuous Infusions:  Scheduled Meds:   acetaminophen  650 mg Oral Q6H    amLODIPine  10 mg Oral Daily    ammonium lactate   Topical (Top) Daily    aspirin  81 mg Oral Daily    atorvastatin  40 mg Oral QHS    carvediloL  12.5 mg Oral BID    ceFEPime (MAXIPIME) IVPB  2 g Intravenous Q12H    clopidogreL  75 mg Oral Daily    DULoxetine  30 mg Oral BID    furosemide  20 mg Oral Daily    micafungin (MYCAMINE) IVPB  100 mg Intravenous Q24H    pantoprazole  40 mg Intravenous BID    potassium chloride  10 mEq Intravenous Q1H    sodium chloride 0.9%  10 mL Intravenous Q6H     PRN Meds:sodium chloride, sodium chloride, dextrose 10%, glucagon (human recombinant), insulin aspart U-100, ondansetron, oxyCODONE, oxymetazoline, sodium chloride 0.9%, Flushing PICC Protocol **AND** sodium chloride 0.9% **AND** sodium chloride 0.9%     Objective:     Vital Signs (Most Recent):  Temp: 98 °F (36.7 °C) (04/09/23 0717)  Pulse: 83 (04/09/23 0717)  Resp: 16 (04/09/23 0801)  BP: 122/60 (04/09/23 0717)  SpO2: (!) 93 % (04/09/23 0717)   Vital Signs (24h Range):  Temp:  [98 °F (36.7 °C)-99.3 °F (37.4 °C)] 98 °F (36.7 °C)  Pulse:  [83-91] 83  Resp:  [16-20] 16  SpO2:  [93 %-99 %] 93 %  BP: (119-144)/(60-67) 122/60     Date 04/09/23 0700 - 04/10/23 0659   Shift 7712-9922 3358-3135 2722-9036 24 Hour Total   INTAKE   Shift Total(mL/kg)       OUTPUT   Drains 0   0   Shift Total(mL/kg) 0(0)   0(0)   Weight (kg) 67 67 67 67       Physical  Exam  Constitutional:       General: She is not in acute distress.     Appearance: She is not ill-appearing.   HENT:      Head: Normocephalic.      Mouth/Throat:      Mouth: Mucous membranes are moist.   Cardiovascular:      Rate and Rhythm: Normal rate.      Comments:   Left: AT, PT triphasic signal    Pulmonary:      Effort: Pulmonary effort is normal. No respiratory distress.   Abdominal:      Palpations: Abdomen is soft.   Musculoskeletal:         General: Normal range of motion.   Skin:     General: Skin is warm and dry.      Capillary Refill: Capillary refill takes less than 2 seconds.      Comments: Left groin incision with wet to dry dressing in place  WILTON drain in place   Neurological:      General: No focal deficit present.      Mental Status: She is alert.       Significant Labs:  CBC:   Recent Labs   Lab 04/09/23  0517   WBC 9.23   RBC 2.88*   HGB 7.8*   HCT 24.9*      MCV 87   MCH 27.1   MCHC 31.3*     CMP:   Recent Labs   Lab 04/09/23  0517   *   CALCIUM 8.4*   ALBUMIN 1.9*   PROT 5.9*      K 3.5   CO2 23      BUN 15   CREATININE 0.7   ALKPHOS 59   ALT 7*   AST 16   BILITOT 0.6       Significant Diagnostics:  I have reviewed all pertinent imaging results/findings within the past 24 hours.    Assessment/Plan:     PVD (peripheral vascular disease)  Nelsy Marino is a 82 y.o. woman with history of HTN, T2DM, DVT, and PAD who underwent left to right fem to SFA bypass and right SFA angioplasty on 3/2/23 and was admitted until 3/20. She was seen by Dr. Dunbar in the office earlier today complaining of left rest pain and also found to have dehiscence of her left groin incision.  S/p wound washout and flip with plastics 3/31, LLE angio on 4/4.     - patient seen and examined  - continues to endorse left foot pain despite triphasic distal signals  - continue aspirin and plavix, as ordered  - Eliquis held given persistent nose bleed  - Insulin SS  - Home meds  - Asa, plavix, statin  -  Podiatry following for right TMA wound  - stable for discharge pending insurance authorization    ID Plan:  Recommendations / Plan:   Continue IV-micafungin 100mg Q24 (cxs +C.glabrata; +C.albicans)   Continue IV-Cefepime 2 g Q12h (renally dosed CrCl 34)  -- [Note: recent h/o R foot TMA wound dehiscence cx+ PSA (03/10/23), s/p cipro course for SSTI].   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   -- ID will schedule pt for f/u appt in ID clinic in 2-3wks    Dispo: LTAC accepted, has PICC for IV abx        Freddy Murillo MD  Vascular Surgery  [facility]

## 2023-04-09 NOTE — SUBJECTIVE & OBJECTIVE
Medications:  Continuous Infusions:  Scheduled Meds:   acetaminophen  650 mg Oral Q6H    amLODIPine  10 mg Oral Daily    ammonium lactate   Topical (Top) Daily    aspirin  81 mg Oral Daily    atorvastatin  40 mg Oral QHS    carvediloL  12.5 mg Oral BID    ceFEPime (MAXIPIME) IVPB  2 g Intravenous Q12H    clopidogreL  75 mg Oral Daily    DULoxetine  30 mg Oral BID    furosemide  20 mg Oral Daily    micafungin (MYCAMINE) IVPB  100 mg Intravenous Q24H    pantoprazole  40 mg Intravenous BID    potassium chloride  10 mEq Intravenous Q1H    sodium chloride 0.9%  10 mL Intravenous Q6H     PRN Meds:sodium chloride, sodium chloride, dextrose 10%, glucagon (human recombinant), insulin aspart U-100, ondansetron, oxyCODONE, oxymetazoline, sodium chloride 0.9%, Flushing PICC Protocol **AND** sodium chloride 0.9% **AND** sodium chloride 0.9%     Objective:     Vital Signs (Most Recent):  Temp: 98 °F (36.7 °C) (04/09/23 0717)  Pulse: 83 (04/09/23 0717)  Resp: 16 (04/09/23 0801)  BP: 122/60 (04/09/23 0717)  SpO2: (!) 93 % (04/09/23 0717)   Vital Signs (24h Range):  Temp:  [98 °F (36.7 °C)-99.3 °F (37.4 °C)] 98 °F (36.7 °C)  Pulse:  [83-91] 83  Resp:  [16-20] 16  SpO2:  [93 %-99 %] 93 %  BP: (119-144)/(60-67) 122/60     Date 04/09/23 0700 - 04/10/23 0659   Shift 1151-5676 7178-7904 4329-5598 24 Hour Total   INTAKE   Shift Total(mL/kg)       OUTPUT   Drains 0   0   Shift Total(mL/kg) 0(0)   0(0)   Weight (kg) 67 67 67 67       Physical Exam  Constitutional:       General: She is not in acute distress.     Appearance: She is not ill-appearing.   HENT:      Head: Normocephalic.      Mouth/Throat:      Mouth: Mucous membranes are moist.   Cardiovascular:      Rate and Rhythm: Normal rate.      Comments:   Left: AT, PT triphasic signal    Pulmonary:      Effort: Pulmonary effort is normal. No respiratory distress.   Abdominal:      Palpations: Abdomen is soft.   Musculoskeletal:         General: Normal range of motion.   Skin:      General: Skin is warm and dry.      Capillary Refill: Capillary refill takes less than 2 seconds.      Comments: Left groin incision with wet to dry dressing in place  WILTON drain in place   Neurological:      General: No focal deficit present.      Mental Status: She is alert.       Significant Labs:  CBC:   Recent Labs   Lab 04/09/23  0517   WBC 9.23   RBC 2.88*   HGB 7.8*   HCT 24.9*      MCV 87   MCH 27.1   MCHC 31.3*     CMP:   Recent Labs   Lab 04/09/23  0517   *   CALCIUM 8.4*   ALBUMIN 1.9*   PROT 5.9*      K 3.5   CO2 23      BUN 15   CREATININE 0.7   ALKPHOS 59   ALT 7*   AST 16   BILITOT 0.6       Significant Diagnostics:  I have reviewed all pertinent imaging results/findings within the past 24 hours.

## 2023-04-09 NOTE — PLAN OF CARE
POC reviewed with patient who verbalized understanding. VSS on RA. AAOX4. Remains free of falls and injury. Frequent weight shift encouraged with assistance provided as needed    - no nose bleed during shift  - 1 x loose, tarry black stool   - R groin w/staples  - R foot wrapped with ACE wrap, dressing changed per order  - L groin w/xeroform, gauze and tape. Dressing changed x 1  - L thigh WILTON drain w/minimal sanguinous drainage  - R PICC  - IV ABX  - Blood glucose being monitored every 6 hours with coverage needed    Tolerating diet, little appetite, denies nausea. Pain controlled with scheduled medications per MAR. Educated on IS use. Patient denies chest pain & SOB. No acute events. No distress noted. Bed in lowest position, call light within reach, frequent rounds made for safety.      Problem: Adult Inpatient Plan of Care  Goal: Plan of Care Review  Outcome: Ongoing, Progressing  Goal: Patient-Specific Goal (Individualized)  Outcome: Ongoing, Progressing  Goal: Absence of Hospital-Acquired Illness or Injury  Outcome: Ongoing, Progressing  Goal: Optimal Comfort and Wellbeing  Outcome: Ongoing, Progressing     Problem: Diabetes Comorbidity  Goal: Blood Glucose Level Within Targeted Range  Outcome: Ongoing, Progressing     Problem: Skin Injury Risk Increased  Goal: Skin Health and Integrity  Outcome: Ongoing, Progressing     Problem: Fall Injury Risk  Goal: Absence of Fall and Fall-Related Injury  Outcome: Ongoing, Progressing     Problem: Infection  Goal: Absence of Infection Signs and Symptoms  Outcome: Ongoing, Progressing

## 2023-04-10 ENCOUNTER — ANESTHESIA EVENT (OUTPATIENT)
Dept: ENDOSCOPY | Facility: HOSPITAL | Age: 83
DRG: 901 | End: 2023-04-10
Payer: MEDICARE

## 2023-04-10 LAB
ALBUMIN SERPL BCP-MCNC: 2 G/DL (ref 3.5–5.2)
ALP SERPL-CCNC: 63 U/L (ref 55–135)
ALT SERPL W/O P-5'-P-CCNC: 8 U/L (ref 10–44)
ANION GAP SERPL CALC-SCNC: 7 MMOL/L (ref 8–16)
AST SERPL-CCNC: 23 U/L (ref 10–40)
BASOPHILS # BLD AUTO: 0.02 K/UL (ref 0–0.2)
BASOPHILS NFR BLD: 0.2 % (ref 0–1.9)
BILIRUB SERPL-MCNC: 0.6 MG/DL (ref 0.1–1)
BUN SERPL-MCNC: 12 MG/DL (ref 8–23)
CALCIUM SERPL-MCNC: 8.5 MG/DL (ref 8.7–10.5)
CHLORIDE SERPL-SCNC: 104 MMOL/L (ref 95–110)
CO2 SERPL-SCNC: 23 MMOL/L (ref 23–29)
CREAT SERPL-MCNC: 0.7 MG/DL (ref 0.5–1.4)
DIFFERENTIAL METHOD: ABNORMAL
EOSINOPHIL # BLD AUTO: 0.4 K/UL (ref 0–0.5)
EOSINOPHIL NFR BLD: 4.3 % (ref 0–8)
ERYTHROCYTE [DISTWIDTH] IN BLOOD BY AUTOMATED COUNT: 15.7 % (ref 11.5–14.5)
EST. GFR  (NO RACE VARIABLE): >60 ML/MIN/1.73 M^2
GLUCOSE SERPL-MCNC: 164 MG/DL (ref 70–110)
HCT VFR BLD AUTO: 25.2 % (ref 37–48.5)
HGB BLD-MCNC: 7.9 G/DL (ref 12–16)
IMM GRANULOCYTES # BLD AUTO: 0.03 K/UL (ref 0–0.04)
IMM GRANULOCYTES NFR BLD AUTO: 0.3 % (ref 0–0.5)
LYMPHOCYTES # BLD AUTO: 1.2 K/UL (ref 1–4.8)
LYMPHOCYTES NFR BLD: 13.3 % (ref 18–48)
MCH RBC QN AUTO: 27.1 PG (ref 27–31)
MCHC RBC AUTO-ENTMCNC: 31.3 G/DL (ref 32–36)
MCV RBC AUTO: 87 FL (ref 82–98)
MONOCYTES # BLD AUTO: 0.5 K/UL (ref 0.3–1)
MONOCYTES NFR BLD: 5.6 % (ref 4–15)
NEUTROPHILS # BLD AUTO: 7.1 K/UL (ref 1.8–7.7)
NEUTROPHILS NFR BLD: 76.3 % (ref 38–73)
NRBC BLD-RTO: 0 /100 WBC
PLATELET # BLD AUTO: 225 K/UL (ref 150–450)
PMV BLD AUTO: 10.9 FL (ref 9.2–12.9)
POCT GLUCOSE: 134 MG/DL (ref 70–110)
POCT GLUCOSE: 167 MG/DL (ref 70–110)
POCT GLUCOSE: 254 MG/DL (ref 70–110)
POTASSIUM SERPL-SCNC: 3.7 MMOL/L (ref 3.5–5.1)
PROT SERPL-MCNC: 6.3 G/DL (ref 6–8.4)
RBC # BLD AUTO: 2.91 M/UL (ref 4–5.4)
SODIUM SERPL-SCNC: 134 MMOL/L (ref 136–145)
WBC # BLD AUTO: 9.32 K/UL (ref 3.9–12.7)

## 2023-04-10 PROCEDURE — 80053 COMPREHEN METABOLIC PANEL: CPT | Performed by: STUDENT IN AN ORGANIZED HEALTH CARE EDUCATION/TRAINING PROGRAM

## 2023-04-10 PROCEDURE — 25000003 PHARM REV CODE 250: Performed by: STUDENT IN AN ORGANIZED HEALTH CARE EDUCATION/TRAINING PROGRAM

## 2023-04-10 PROCEDURE — 63600175 PHARM REV CODE 636 W HCPCS: Performed by: STUDENT IN AN ORGANIZED HEALTH CARE EDUCATION/TRAINING PROGRAM

## 2023-04-10 PROCEDURE — 20600001 HC STEP DOWN PRIVATE ROOM

## 2023-04-10 PROCEDURE — 85025 COMPLETE CBC W/AUTO DIFF WBC: CPT

## 2023-04-10 PROCEDURE — A4216 STERILE WATER/SALINE, 10 ML: HCPCS | Performed by: SURGERY

## 2023-04-10 PROCEDURE — 25000003 PHARM REV CODE 250: Performed by: SURGERY

## 2023-04-10 PROCEDURE — C9113 INJ PANTOPRAZOLE SODIUM, VIA: HCPCS | Performed by: STUDENT IN AN ORGANIZED HEALTH CARE EDUCATION/TRAINING PROGRAM

## 2023-04-10 RX ORDER — GABAPENTIN 300 MG/1
300 CAPSULE ORAL 3 TIMES DAILY
Status: DISCONTINUED | OUTPATIENT
Start: 2023-04-10 | End: 2023-04-11 | Stop reason: HOSPADM

## 2023-04-10 RX ADMIN — ACETAMINOPHEN 650 MG: 325 TABLET ORAL at 11:04

## 2023-04-10 RX ADMIN — GABAPENTIN 300 MG: 300 CAPSULE ORAL at 08:04

## 2023-04-10 RX ADMIN — PANTOPRAZOLE SODIUM 40 MG: 40 INJECTION, POWDER, FOR SOLUTION INTRAVENOUS at 08:04

## 2023-04-10 RX ADMIN — Medication 10 ML: at 12:04

## 2023-04-10 RX ADMIN — ASPIRIN 81 MG: 81 TABLET, COATED ORAL at 08:04

## 2023-04-10 RX ADMIN — Medication 10 ML: at 05:04

## 2023-04-10 RX ADMIN — CARVEDILOL 12.5 MG: 12.5 TABLET, FILM COATED ORAL at 08:04

## 2023-04-10 RX ADMIN — INSULIN ASPART 6 UNITS: 100 INJECTION, SOLUTION INTRAVENOUS; SUBCUTANEOUS at 11:04

## 2023-04-10 RX ADMIN — INSULIN ASPART 2 UNITS: 100 INJECTION, SOLUTION INTRAVENOUS; SUBCUTANEOUS at 05:04

## 2023-04-10 RX ADMIN — GABAPENTIN 300 MG: 300 CAPSULE ORAL at 03:04

## 2023-04-10 RX ADMIN — CEFEPIME 2 G: 2 INJECTION, POWDER, FOR SOLUTION INTRAVENOUS at 01:04

## 2023-04-10 RX ADMIN — DULOXETINE 30 MG: 30 CAPSULE, DELAYED RELEASE ORAL at 08:04

## 2023-04-10 RX ADMIN — FUROSEMIDE 20 MG: 20 TABLET ORAL at 08:04

## 2023-04-10 RX ADMIN — CLOPIDOGREL BISULFATE 75 MG: 75 TABLET ORAL at 08:04

## 2023-04-10 RX ADMIN — Medication 10 ML: at 11:04

## 2023-04-10 RX ADMIN — AMLODIPINE BESYLATE 10 MG: 10 TABLET ORAL at 08:04

## 2023-04-10 RX ADMIN — MICAFUNGIN SODIUM 100 MG: 100 INJECTION, POWDER, LYOPHILIZED, FOR SOLUTION INTRAVENOUS at 08:04

## 2023-04-10 RX ADMIN — ACETAMINOPHEN 650 MG: 325 TABLET ORAL at 05:04

## 2023-04-10 RX ADMIN — OXYCODONE HYDROCHLORIDE 5 MG: 5 TABLET ORAL at 08:04

## 2023-04-10 RX ADMIN — ATORVASTATIN CALCIUM 40 MG: 40 TABLET, FILM COATED ORAL at 08:04

## 2023-04-10 RX ADMIN — OXYCODONE HYDROCHLORIDE 5 MG: 5 TABLET ORAL at 01:04

## 2023-04-10 RX ADMIN — Medication: at 08:04

## 2023-04-10 NOTE — PROGRESS NOTES
[facility] - [facility]  Vascular Surgery  Progress Note    Patient Name: Nelsy Marino  MRN: [id number]  Admission Date: 3/29/2023  Primary Care Provider: Juan F Garay    Subjective:     Interval History: NAEO. No further nose bleeds. Resting comfortably in bed this morning. Awaiting [facility] insurance authorization.    Post-Op Info:  Procedure(s) (LRB):  Angiogram Extremity Unilateral - possible endovascular intervention (Right)   6 Days Post-Op       Medications:  Continuous Infusions:  Scheduled Meds:   acetaminophen  650 mg Oral Q6H    amLODIPine  10 mg Oral Daily    ammonium lactate   Topical (Top) Daily    aspirin  81 mg Oral Daily    atorvastatin  40 mg Oral QHS    carvediloL  12.5 mg Oral BID    ceFEPime (MAXIPIME) IVPB  2 g Intravenous Q12H    clopidogreL  75 mg Oral Daily    DULoxetine  30 mg Oral BID    furosemide  20 mg Oral Daily    micafungin (MYCAMINE) IVPB  100 mg Intravenous Q24H    pantoprazole  40 mg Intravenous BID    sodium chloride 0.9%  10 mL Intravenous Q6H     PRN Meds:sodium chloride, sodium chloride, dextrose 10%, glucagon (human recombinant), insulin aspart U-100, ondansetron, oxyCODONE, oxymetazoline, sodium chloride 0.9%, Flushing PICC Protocol **AND** sodium chloride 0.9% **AND** sodium chloride 0.9%     Objective:     Vital Signs (Most Recent):  Temp: 98.5 °F (36.9 °C) (04/10/23 0415)  Pulse: 87 (04/10/23 0415)  Resp: 18 (04/10/23 0415)  BP: 115/71 (04/10/23 0415)  SpO2: 99 % (04/10/23 0415)   Vital Signs (24h Range):  Temp:  [98 °F (36.7 °C)-98.5 °F (36.9 °C)] 98.5 °F (36.9 °C)  Pulse:  [] 87  Resp:  [15-20] 18  SpO2:  [93 %-99 %] 99 %  BP: (115-131)/(59-85) 115/71           Physical Exam  Constitutional:       General: She is not in acute distress.     Appearance: She is not ill-appearing.   HENT:      Head: Normocephalic.      Mouth/Throat:      Mouth: Mucous membranes are moist.   Cardiovascular:      Rate and Rhythm: Normal rate.      Comments:   Left: AT, PT  triphasic signal    Pulmonary:      Effort: Pulmonary effort is normal. No respiratory distress.   Abdominal:      Palpations: Abdomen is soft.   Musculoskeletal:         General: Normal range of motion.   Skin:     General: Skin is warm and dry.      Capillary Refill: Capillary refill takes less than 2 seconds.      Comments: Left groin incision with wet to dry dressing in place  WILTON drain in place   Neurological:      General: No focal deficit present.      Mental Status: She is alert.       Significant Labs:  CBC:   Recent Labs   Lab 04/10/23  0553   WBC 9.32   RBC 2.91*   HGB 7.9*   HCT 25.2*      MCV 87   MCH 27.1   MCHC 31.3*       CMP:   Recent Labs   Lab 04/10/23  0553   *   CALCIUM 8.5*   ALBUMIN 2.0*   PROT 6.3   *   K 3.7   CO2 23      BUN 12   CREATININE 0.7   ALKPHOS 63   ALT 8*   AST 23   BILITOT 0.6         Significant Diagnostics:  I have reviewed all pertinent imaging results/findings within the past 24 hours.    Assessment/Plan:     PVD (peripheral vascular disease)  Nelsy Marino is a 82 y.o. woman with history of HTN, T2DM, DVT, and PAD who underwent left to right fem to SFA bypass and right SFA angioplasty on 3/2/23 and was admitted until 3/20. She was seen by Dr. Dunbar in the office earlier today complaining of left rest pain and also found to have dehiscence of her left groin incision.  S/p wound washout and flip with plastics 3/31, LLE angio on 4/4.     - patient seen and examined  - continues to endorse left foot pain despite triphasic distal signals  - continue aspirin and plavix, as ordered  - Eliquis held given persistent nose bleed  - Insulin SS  - Home meds  - Asa, plavix, statin  - Podiatry following for right TMA wound  - Plan for EGD with GI tomorrow   - NPO at midnight   - Okay for d/c after EGD  - Awaiting insurance authorization for LTACH    ID Plan:  Recommendations / Plan:   Continue IV-micafungin 100mg Q24 (cxs +C.glabrata; +C.albicans)   Continue  IV-Cefepime 2 g Q12h (renally dosed CrCl 34)  -- [Note: recent h/o R foot TMA wound dehiscence cx+ PSA (03/10/23), s/p cipro course for SSTI].   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   -- ID will schedule pt for f/u appt in ID clinic in 2-3wks    Dispo: LTAC accepted, has PICC for IV abx        Leonidas Dallas MD  Vascular Surgery  [facility]

## 2023-04-10 NOTE — SUBJECTIVE & OBJECTIVE
Medications:  Continuous Infusions:  Scheduled Meds:   acetaminophen  650 mg Oral Q6H    amLODIPine  10 mg Oral Daily    ammonium lactate   Topical (Top) Daily    aspirin  81 mg Oral Daily    atorvastatin  40 mg Oral QHS    carvediloL  12.5 mg Oral BID    ceFEPime (MAXIPIME) IVPB  2 g Intravenous Q12H    clopidogreL  75 mg Oral Daily    DULoxetine  30 mg Oral BID    furosemide  20 mg Oral Daily    micafungin (MYCAMINE) IVPB  100 mg Intravenous Q24H    pantoprazole  40 mg Intravenous BID    sodium chloride 0.9%  10 mL Intravenous Q6H     PRN Meds:sodium chloride, sodium chloride, dextrose 10%, glucagon (human recombinant), insulin aspart U-100, ondansetron, oxyCODONE, oxymetazoline, sodium chloride 0.9%, Flushing PICC Protocol **AND** sodium chloride 0.9% **AND** sodium chloride 0.9%     Objective:     Vital Signs (Most Recent):  Temp: 98.5 °F (36.9 °C) (04/10/23 0415)  Pulse: 87 (04/10/23 0415)  Resp: 18 (04/10/23 0415)  BP: 115/71 (04/10/23 0415)  SpO2: 99 % (04/10/23 0415)   Vital Signs (24h Range):  Temp:  [98 °F (36.7 °C)-98.5 °F (36.9 °C)] 98.5 °F (36.9 °C)  Pulse:  [] 87  Resp:  [15-20] 18  SpO2:  [93 %-99 %] 99 %  BP: (115-131)/(59-85) 115/71           Physical Exam  Constitutional:       General: She is not in acute distress.     Appearance: She is not ill-appearing.   HENT:      Head: Normocephalic.      Mouth/Throat:      Mouth: Mucous membranes are moist.   Cardiovascular:      Rate and Rhythm: Normal rate.      Comments:   Left: AT, PT triphasic signal    Pulmonary:      Effort: Pulmonary effort is normal. No respiratory distress.   Abdominal:      Palpations: Abdomen is soft.   Musculoskeletal:         General: Normal range of motion.   Skin:     General: Skin is warm and dry.      Capillary Refill: Capillary refill takes less than 2 seconds.      Comments: Left groin incision with wet to dry dressing in place  WILTON drain in place   Neurological:      General: No focal deficit present.      Mental  Status: She is alert.       Significant Labs:  CBC:   Recent Labs   Lab 04/10/23  0553   WBC 9.32   RBC 2.91*   HGB 7.9*   HCT 25.2*      MCV 87   MCH 27.1   MCHC 31.3*       CMP:   Recent Labs   Lab 04/10/23  0553   *   CALCIUM 8.5*   ALBUMIN 2.0*   PROT 6.3   *   K 3.7   CO2 23      BUN 12   CREATININE 0.7   ALKPHOS 63   ALT 8*   AST 23   BILITOT 0.6         Significant Diagnostics:  I have reviewed all pertinent imaging results/findings within the past 24 hours.

## 2023-04-10 NOTE — PLAN OF CARE
[facility]  Discharge Reassessment    Primary Care Provider: Juan F Garay    Expected Discharge Date: 4/11/2023    Plan is for patient to have GI scope tomorrow 4/11 and the d/c to [facility], if we obtain auth     Reassessment (most recent)       Discharge Reassessment - 04/10/23 1018          Discharge Reassessment    Assessment Type Discharge Planning Reassessment     Discharge Plan A Long-term acute care facility (LTAC)     Discharge Plan B Home Health     DME Needed Upon Discharge  negative pressure wound therapy device     Why the patient remains in the hospital Requires continued medical care        Post-Acute Status    Post-Acute Authorization Placement     Post-Acute Placement Status Pending payor review/awaiting authorization (if required)                   Sonya Villalpando RN, CM   Ext: [phone]

## 2023-04-10 NOTE — ANESTHESIA PREPROCEDURE EVALUATION
[facility]  Anesthesia Pre-Operative Evaluation         Patient Name: Nelsy Marino  YOB: 1940  MRN: [id number]    SUBJECTIVE:     Pre-operative Evaluation for Procedure(s) (LRB):  EGD (ESOPHAGOGASTRODUODENOSCOPY) (N/A)     04/10/2023    Nelsy Marino is a 82 y.o. female with a PMHx significant for DVT (on Apixaban), HFpEF, pulmonary hypertension, and peripheral vascular disease (status post lower angioplasty in early 03/2023 and on ASA and PLAVIX) who was admitted to [facility] on 03/29 for management of dehiscence and infection of left grown surgical wound secondary to fungal infection.    Hospital course associated with onset of epistaxis and reported GI bleeding with melena on 04/07 that has reportedly been on-going     The patient now presents for the above procedure(s).    Previous Airway:  Date/Time: 3/31/2023 7:28 AM  Performed by: Damion Khan MD  Authorized by: Ursula Leos MD      Intubation:     Induction:  Intravenous    Intubated:  Postinduction    Mask Ventilation:  Easy with oral airway    Attempts:  1    Attempted By:  Staff anesthesiologist    Method of Intubation:  Direct    Blade:  Mendez 2    Laryngeal View Grade: Grade I - full view of cords      Difficult Airway Encountered?: No      Complications:  None    Airway Device:  Oral endotracheal tube    Airway Device Size:  7.0    Style/Cuff Inflation:  Cuffed (inflated to minimal occlusive pressure)    Secured at:  The lips    Placement Verified By:  Capnometry    Complicating Factors:  None    Findings Post-Intubation:  BS equal bilateral    LDA:   PICC Double Lumen 04/05/23 1107 right basilic (Active)   Number of days: 5            Closed/Suction Drain 03/31/23 0953 Left;Anterior;Lateral Thigh Bulb 15 Fr. (Active)   Output (mL) 0 mL 04/10/23 0556   Number of days: 10       Drips: None documented.      Patient Active Problem List   Diagnosis    Lichen sclerosus et atrophicus    PVD (peripheral vascular  disease)    Essential hypertension    Type 2 diabetes mellitus with chronic kidney disease, with long-term current use of insulin    Diastolic CHF, chronic    Obesity (BMI 30-39.9)    Critical right lower limb ischemia    Acute hypoxemic respiratory failure    History of DVT (deep vein thrombosis)    Wound dehiscence    Fluid collection at surgical site    Cellulitis    Postoperative seroma of musculoskeletal structure after non-musculoskeletal procedure    Abscess of right groin    Critical limb ischemia with history of revascularization of same extremity    Sepsis    Hyponatremia    Vascular graft infection    S/P transmetatarsal amputation of foot, right    Leukocytosis    Acute encephalopathy    Bilious vomiting    Upper GI bleed    Diabetic foot infection    Melena    Eschar of foot    Acute on chronic anemia       Past Medical History:   Diagnosis Date    CHF (congestive heart failure)     Diabetes mellitus     DVT (deep venous thrombosis)     Hypertension     Miscarriage        Review of patient's allergies indicates:   Allergen Reactions    Motrin [ibuprofen] Itching       Current Inpatient Medications:   acetaminophen  650 mg Oral Q6H    amLODIPine  10 mg Oral Daily    ammonium lactate   Topical (Top) Daily    aspirin  81 mg Oral Daily    atorvastatin  40 mg Oral QHS    carvediloL  12.5 mg Oral BID    ceFEPime (MAXIPIME) IVPB  2 g Intravenous Q12H    clopidogreL  75 mg Oral Daily    DULoxetine  30 mg Oral BID    furosemide  20 mg Oral Daily    gabapentin  300 mg Oral TID    micafungin (MYCAMINE) IVPB  100 mg Intravenous Q24H    pantoprazole  40 mg Intravenous BID    sodium chloride 0.9%  10 mL Intravenous Q6H       Current Outpatient Medications   Medication Instructions    amLODIPine (NORVASC) 10 mg, Oral, Daily    aspirin (ECOTRIN) 81 mg, Oral, Daily    atorvastatin (LIPITOR) 40 mg, Oral, Nightly    BLOOD SUGAR DIAGNOSTIC (TRUE METRIX GLUCOSE TEST STRIP  "MISC) Misc.(Non-Drug; Combo Route)    carvediloL (COREG) 12.5 mg, Oral, 2 times daily    cephALEXin (KEFLEX) 1,000 mg, Oral, Every 12 hours    clopidogreL (PLAVIX) 75 mg, Oral, Daily    DULoxetine (CYMBALTA) 30 mg, Oral, 2 times daily    ELIQUIS 5 mg, Oral, 2 times daily    furosemide (LASIX) 20 mg, Oral, Daily    insulin aspart protamine-insulin aspart (NOVOLOG MIX 70-30FLEXPEN U-100) 100 unit/mL (70-30) InPn pen 12 Units, Subcutaneous, 2 times daily before meals    insulin syringe-needle,dispos. 0.3 mL 30 gauge x 5/16" Syrg Misc.(Non-Drug; Combo Route)    losartan-hydrochlorothiazide 100-25 mg (HYZAAR) 100-25 mg per tablet 1 tablet, Oral, Daily    metFORMIN (GLUCOPHAGE) 1,000 mg, Oral, 2 times daily with meals    ondansetron (ZOFRAN) 8 MG tablet No dose, route, or frequency recorded.    pantoprazole (PROTONIX) 40 mg, Oral, 2 times daily    pen needle, diabetic 32 gauge x 5/32" Ndle 1 each, Subcutaneous, 4 times daily       Past Surgical History:   Procedure Laterality Date     femoral vascular surgery       ANGIOGRAPHY OF LOWER EXTREMITY Right 2/23/2023    Procedure: Angiogram Extremity Unilateral;  Surgeon: Danny Dunbar MD;  Location: [facility];  Service: Vascular;  Laterality: Right;  RN PHONE PREOP 2/20/2023----CK CONSENT    ANGIOGRAPHY OF LOWER EXTREMITY Right 4/4/2023    Procedure: Angiogram Extremity Unilateral - possible endovascular intervention;  Surgeon: René Simms MD;  Location: [facility] OR [address] [place] [address];  Service: Vascular;  Laterality: Right;  19.6 mins.  740.81 mGy  73.3399 Gy.cm  58ml dye  local - 1ml    CREATION OF FEMORAL-FEMORAL ARTERY BYPASS WITH GRAFT Bilateral 3/2/2023    Procedure: CREATION, BYPASS, ARTERIAL, FEMORAL TO FEMORAL, USING GRAFT, LEFT FEMORAL ENDARTERECTOMY, LEFT ILIAC ARTERY STENT PLACEMENT, RIGHT LOWER EXTREMITY ANGIOGRAM AND RIGHT SFA  ANGIOPLASTY;  Surgeon: Danny Dunbar MD;  Location: [facility] OR [address] [place] [address];  Service: Vascular;  Laterality: Bilateral;   " 744.47 MGY  175.36 Gycm  Flouro time 20.5 mins      CREATION OF ILIOFEMORAL ARTERY BYPASS Right 3/20/2019    Procedure: CREATION, BYPASS, ARTERIAL, ILIAC TO FEMORAL, RIGHT LOWER EXTREMITY, RIGHT PROFUNDAPLASTY;  Surgeon: Danny Dunbar MD;  Location: [facility];  Service: Vascular;  Laterality: Right;  11:00AM START PER ANNE RICHARDS PREOP 3/13/2019  ACT'S, CELL SAVER, GRAFTS, BOOK WATER---NEED H/P  CONSENT IN AM------HGBA1C    CREATION OF MUSCLE ROTATIONAL FLAP Left 3/31/2023    Procedure: CREATION, FLAP, MUSCLE ROTATION;  Surgeon: Brandon Smiley MD;  Location: [facility] [place] [address] [place] [address];  Service: Plastics;  Laterality: Left;    ESOPHAGOGASTRODUODENOSCOPY N/A 3/17/2023    Procedure: EGD (ESOPHAGOGASTRODUODENOSCOPY);  Surgeon: Gee Rodriguez MD;  Location: [facility] ([address] [place] [address]);  Service: Endoscopy;  Laterality: N/A;    FOOT AMPUTATION THROUGH METATARSAL Right 3/4/2023    Procedure: AMPUTATION, FOOT, TRANSMETATARSAL;  Surgeon: Benjamin Godfrey DPM;  Location: [address];  Service: Podiatry;  Laterality: Right;    HYSTERECTOMY      ?unsure cervix present    INCISION AND DRAINAGE Right 4/12/2019    Procedure: Incision and Drainage - R groin washout, possible muscle flap;  Surgeon: Danny Dunbar MD;  Location: [address];  Service: Vascular;  Laterality: Right;  groin washout    INCISION AND DRAINAGE OF GROIN Right 5/3/2019    Procedure: Incision and Drainage R groin;  Surgeon: Danny Dunbar MD;  Location: [address];  Service: Vascular;  Laterality: Right;    WOUND DEBRIDEMENT Left 3/31/2023    Procedure: DEBRIDEMENT, WOUND;  Surgeon: René Simms MD;  Location: [address];  Service: Vascular;  Laterality: Left;       Social History     Substance and Sexual Activity   Drug Use No     Tobacco Use: Medium Risk    Smoking Tobacco Use: Former    Smokeless Tobacco Use: Never    Passive Exposure: Not on file     Alcohol Use: Unknown    Frequency of Alcohol Consumption:  Patient refused    Average Number of Drinks: Patient refused    Frequency of Binge Drinking: Patient refused         OBJECTIVE:     Vital Signs Range (Last 24H):  Temp:  [36.7 °C (98 °F)-37 °C (98.6 °F)]   Pulse:  []   Resp:  [16-20]   BP: (115-147)/(58-71)   SpO2:  [97 %-99 %]       Significant Labs    Heme Profile  Lab Results   Component Value Date    WBC 9.32 04/10/2023    HGB 7.9 (L) 04/10/2023    HCT 25.2 (L) 04/10/2023     04/10/2023       Coagulation Studies  Lab Results   Component Value Date    LABPROT 12.1 04/04/2023    INR 1.2 04/04/2023    APTT 53.2 (H) 04/05/2023    APTT 54.3 (H) 04/05/2023       BMP  Lab Results   Component Value Date     (L) 04/10/2023    K 3.7 04/10/2023     04/10/2023    CO2 23 04/10/2023    BUN 12 04/10/2023    CREATININE 0.7 04/10/2023    MG 2.1 03/30/2023    PHOS 3.8 03/30/2023       Liver Function Tests  Lab Results   Component Value Date    AST 23 04/10/2023    ALT 8 (L) 04/10/2023    ALKPHOS 63 04/10/2023    BILITOT 0.6 04/10/2023    PROT 6.3 04/10/2023    ALBUMIN 2.0 (L) 04/10/2023       Lipid Profile  Lab Results   Component Value Date    CHOL 109 (L) 02/28/2023    HDL 38 (L) 02/28/2023    TRIG 58 02/28/2023       Endocrine Profile  Lab Results   Component Value Date    HGBA1C 8.2 (H) 02/28/2023       Cardiac Studies    EKG:   Results for orders placed or performed during the hospital encounter of 03/29/23   EKG 12-lead    Collection Time: 03/29/23  8:18 PM    Narrative    Test Reason : M79.606,    Vent. Rate : 088 BPM     Atrial Rate : 088 BPM     P-R Int : 172 ms          QRS Dur : 080 ms      QT Int : 342 ms       P-R-T Axes : 051 039 051 degrees     QTc Int : 413 ms    Normal sinus rhythm  Normal ECG  When compared with ECG of 08-MAR-2023 19:20,  Non-specific change in ST segment in Inferior leads  Nonspecific T wave abnormality, improved in Inferior leads  T wave inversion no longer evident in Lateral leads  Confirmed by Dickson WRIGHT, [place]  ([id number]) on 3/30/2023 2:52:23 PM    Referred By: NAHEED   SELF           Confirmed By:Luke Forman MD       TTE  Interpretation Summary  · The estimated ejection fraction is 65%.  · The left ventricle is normal in size with normal systolic function.  · Grade II left ventricular diastolic dysfunction.  · Normal right ventricular size with normal right ventricular systolic function.  · Mild left atrial enlargement.  · Mild-to-moderate mitral regurgitation.  · There is pulmonary hypertension.  · The estimated PA systolic pressure is 55 mmHg.  · Intermediate central venous pressure (8 mmHg).      Nuclear Stress Echo  Interpretation Summary    Normal myocardial perfusion scan. There is no evidence of myocardial ischemia or infarction.    The visually estimated ejection fraction is normal during stress.    There is normal wall motion post stress.      ASSESSMENT/PLAN:         Pre-op Assessment    I have reviewed the Patient Summary Reports.     I have reviewed the Nursing Notes. I have reviewed the NPO Status.   I have reviewed the Medications.     Review of Systems  Anesthesia Hx:  No problems with previous Anesthesia  History of prior surgery of interest to airway management or planning: Denies Family Hx of Anesthesia complications.   Denies Personal Hx of Anesthesia complications.   Hematology/Oncology:     Oncology Normal    -- Anemia:   EENT/Dental:EENT/Dental Normal   Cardiovascular:   Hypertension CHF PVD hyperlipidemia    Pulmonary:  Pulmonary Normal    Renal/:   Chronic Renal Disease    Hepatic/GI:  Hepatic/GI Normal    Neurological:  Neurology Normal    Endocrine:   Diabetes, type 2        Physical Exam  General: Well nourished, Cooperative, Alert and Oriented    Airway:  Mallampati: III   Mouth Opening: Normal  TM Distance: Normal  Tongue: Normal  Neck ROM: Normal ROM    Dental:  Dentures        Anesthesia Plan  Type of Anesthesia, risks & benefits discussed:    Anesthesia Type: Gen ETT, Gen Natural  Airway, MAC  Intra-op Monitoring Plan: Standard ASA Monitors  Post Op Pain Control Plan: multimodal analgesia and IV/PO Opioids PRN  Induction:  IV  Airway Plan: Direct, Post-Induction  Informed Consent: Informed consent signed with the Patient and all parties understand the risks and agree with anesthesia plan.  All questions answered.   ASA Score: 3  Day of Surgery Review of History & Physical: H&P Update referred to the surgeon/provider.    Ready For Surgery From Anesthesia Perspective.     .

## 2023-04-10 NOTE — NURSING
Patient AAOx4, VSS, pain controlled with scheduled pain medicine,  incision sites clean dry and intact, no bowel movement during shift,  morning labs drawn via PICC this morning, WILTON had no output with bulb suction maintained, free from falls or injury, tolerating diet,  at bedside, bed low and locked with alarm activated, side rails upx3, call light and personal belongings in reach.

## 2023-04-11 ENCOUNTER — ANESTHESIA (OUTPATIENT)
Dept: ENDOSCOPY | Facility: HOSPITAL | Age: 83
DRG: 901 | End: 2023-04-11
Payer: MEDICARE

## 2023-04-11 VITALS
RESPIRATION RATE: 17 BRPM | OXYGEN SATURATION: 96 % | SYSTOLIC BLOOD PRESSURE: 150 MMHG | HEART RATE: 85 BPM | HEIGHT: 64 IN | DIASTOLIC BLOOD PRESSURE: 66 MMHG | BODY MASS INDEX: 25.21 KG/M2 | TEMPERATURE: 98 F | WEIGHT: 147.69 LBS

## 2023-04-11 LAB
ALBUMIN SERPL BCP-MCNC: 1.9 G/DL (ref 3.5–5.2)
ALP SERPL-CCNC: 62 U/L (ref 55–135)
ALT SERPL W/O P-5'-P-CCNC: 6 U/L (ref 10–44)
ANION GAP SERPL CALC-SCNC: 6 MMOL/L (ref 8–16)
AST SERPL-CCNC: 16 U/L (ref 10–40)
BASOPHILS # BLD AUTO: 0.02 K/UL (ref 0–0.2)
BASOPHILS NFR BLD: 0.2 % (ref 0–1.9)
BILIRUB SERPL-MCNC: 0.6 MG/DL (ref 0.1–1)
BUN SERPL-MCNC: 14 MG/DL (ref 8–23)
CALCIUM SERPL-MCNC: 8.5 MG/DL (ref 8.7–10.5)
CHLORIDE SERPL-SCNC: 103 MMOL/L (ref 95–110)
CO2 SERPL-SCNC: 23 MMOL/L (ref 23–29)
CREAT SERPL-MCNC: 0.7 MG/DL (ref 0.5–1.4)
DIFFERENTIAL METHOD: ABNORMAL
EOSINOPHIL # BLD AUTO: 0.6 K/UL (ref 0–0.5)
EOSINOPHIL NFR BLD: 6.4 % (ref 0–8)
ERYTHROCYTE [DISTWIDTH] IN BLOOD BY AUTOMATED COUNT: 15.1 % (ref 11.5–14.5)
EST. GFR  (NO RACE VARIABLE): >60 ML/MIN/1.73 M^2
GLUCOSE SERPL-MCNC: 133 MG/DL (ref 70–110)
HCT VFR BLD AUTO: 25.2 % (ref 37–48.5)
HGB BLD-MCNC: 7.8 G/DL (ref 12–16)
IMM GRANULOCYTES # BLD AUTO: 0.03 K/UL (ref 0–0.04)
IMM GRANULOCYTES NFR BLD AUTO: 0.3 % (ref 0–0.5)
LYMPHOCYTES # BLD AUTO: 1.2 K/UL (ref 1–4.8)
LYMPHOCYTES NFR BLD: 13.4 % (ref 18–48)
MCH RBC QN AUTO: 27.2 PG (ref 27–31)
MCHC RBC AUTO-ENTMCNC: 31 G/DL (ref 32–36)
MCV RBC AUTO: 88 FL (ref 82–98)
MONOCYTES # BLD AUTO: 0.6 K/UL (ref 0.3–1)
MONOCYTES NFR BLD: 6.9 % (ref 4–15)
NEUTROPHILS # BLD AUTO: 6.4 K/UL (ref 1.8–7.7)
NEUTROPHILS NFR BLD: 72.8 % (ref 38–73)
NRBC BLD-RTO: 0 /100 WBC
PLATELET # BLD AUTO: 242 K/UL (ref 150–450)
PMV BLD AUTO: 11.2 FL (ref 9.2–12.9)
POCT GLUCOSE: 145 MG/DL (ref 70–110)
POCT GLUCOSE: 162 MG/DL (ref 70–110)
POCT GLUCOSE: 165 MG/DL (ref 70–110)
POCT GLUCOSE: 172 MG/DL (ref 70–110)
POCT GLUCOSE: 316 MG/DL (ref 70–110)
POTASSIUM SERPL-SCNC: 3.6 MMOL/L (ref 3.5–5.1)
PROT SERPL-MCNC: 6.2 G/DL (ref 6–8.4)
RBC # BLD AUTO: 2.87 M/UL (ref 4–5.4)
SODIUM SERPL-SCNC: 132 MMOL/L (ref 136–145)
WBC # BLD AUTO: 8.79 K/UL (ref 3.9–12.7)

## 2023-04-11 PROCEDURE — D9220A PRA ANESTHESIA: ICD-10-PCS | Mod: ANES,,, | Performed by: ANESTHESIOLOGY

## 2023-04-11 PROCEDURE — 82962 GLUCOSE BLOOD TEST: CPT | Performed by: INTERNAL MEDICINE

## 2023-04-11 PROCEDURE — 25000003 PHARM REV CODE 250: Performed by: SURGERY

## 2023-04-11 PROCEDURE — 37000008 HC ANESTHESIA 1ST 15 MINUTES: Performed by: INTERNAL MEDICINE

## 2023-04-11 PROCEDURE — 27202087 HC PROBE, APC: Performed by: INTERNAL MEDICINE

## 2023-04-11 PROCEDURE — 25000003 PHARM REV CODE 250: Performed by: STUDENT IN AN ORGANIZED HEALTH CARE EDUCATION/TRAINING PROGRAM

## 2023-04-11 PROCEDURE — A4216 STERILE WATER/SALINE, 10 ML: HCPCS | Performed by: SURGERY

## 2023-04-11 PROCEDURE — 25000003 PHARM REV CODE 250: Performed by: NURSE ANESTHETIST, CERTIFIED REGISTERED

## 2023-04-11 PROCEDURE — 43270 PR EGD, FLEX, W/ABLATION, TUMOR/POLYP/LESION(S), W/ DILATION: ICD-10-PCS | Mod: GC,,, | Performed by: INTERNAL MEDICINE

## 2023-04-11 PROCEDURE — D9220A PRA ANESTHESIA: Mod: ANES,,, | Performed by: ANESTHESIOLOGY

## 2023-04-11 PROCEDURE — 63600175 PHARM REV CODE 636 W HCPCS: Performed by: NURSE ANESTHETIST, CERTIFIED REGISTERED

## 2023-04-11 PROCEDURE — 43270 EGD LESION ABLATION: CPT | Performed by: INTERNAL MEDICINE

## 2023-04-11 PROCEDURE — D9220A PRA ANESTHESIA: ICD-10-PCS | Mod: CRNA,,, | Performed by: NURSE ANESTHETIST, CERTIFIED REGISTERED

## 2023-04-11 PROCEDURE — 94761 N-INVAS EAR/PLS OXIMETRY MLT: CPT

## 2023-04-11 PROCEDURE — 63600175 PHARM REV CODE 636 W HCPCS: Mod: JZ,JG | Performed by: STUDENT IN AN ORGANIZED HEALTH CARE EDUCATION/TRAINING PROGRAM

## 2023-04-11 PROCEDURE — 37000009 HC ANESTHESIA EA ADD 15 MINS: Performed by: INTERNAL MEDICINE

## 2023-04-11 PROCEDURE — 63600175 PHARM REV CODE 636 W HCPCS: Performed by: STUDENT IN AN ORGANIZED HEALTH CARE EDUCATION/TRAINING PROGRAM

## 2023-04-11 PROCEDURE — D9220A PRA ANESTHESIA: Mod: CRNA,,, | Performed by: NURSE ANESTHETIST, CERTIFIED REGISTERED

## 2023-04-11 PROCEDURE — 80053 COMPREHEN METABOLIC PANEL: CPT | Performed by: STUDENT IN AN ORGANIZED HEALTH CARE EDUCATION/TRAINING PROGRAM

## 2023-04-11 PROCEDURE — 43270 EGD LESION ABLATION: CPT | Mod: GC,,, | Performed by: INTERNAL MEDICINE

## 2023-04-11 PROCEDURE — 85025 COMPLETE CBC W/AUTO DIFF WBC: CPT

## 2023-04-11 RX ORDER — LIDOCAINE HYDROCHLORIDE 10 MG/ML
INJECTION, SOLUTION INTRAVENOUS
Status: DISCONTINUED | OUTPATIENT
Start: 2023-04-11 | End: 2023-04-11

## 2023-04-11 RX ORDER — BENZONATATE 100 MG/1
100 CAPSULE ORAL 3 TIMES DAILY PRN
Qty: 20 CAPSULE | Refills: 0 | Status: ON HOLD | OUTPATIENT
Start: 2023-04-11 | End: 2023-04-29 | Stop reason: HOSPADM

## 2023-04-11 RX ORDER — PROPOFOL 10 MG/ML
VIAL (ML) INTRAVENOUS
Status: DISCONTINUED | OUTPATIENT
Start: 2023-04-11 | End: 2023-04-11

## 2023-04-11 RX ORDER — OXYCODONE HYDROCHLORIDE 5 MG/1
5 TABLET ORAL EVERY 6 HOURS PRN
Qty: 12 TABLET | Refills: 0 | Status: ON HOLD | OUTPATIENT
Start: 2023-04-11 | End: 2023-05-03 | Stop reason: HOSPADM

## 2023-04-11 RX ORDER — ONDANSETRON 2 MG/ML
4 INJECTION INTRAMUSCULAR; INTRAVENOUS ONCE AS NEEDED
Status: DISCONTINUED | OUTPATIENT
Start: 2023-04-11 | End: 2023-04-11 | Stop reason: HOSPADM

## 2023-04-11 RX ORDER — SODIUM CHLORIDE 0.9 % (FLUSH) 0.9 %
10 SYRINGE (ML) INJECTION
Status: DISCONTINUED | OUTPATIENT
Start: 2023-04-11 | End: 2023-04-11 | Stop reason: HOSPADM

## 2023-04-11 RX ORDER — PROPOFOL 10 MG/ML
VIAL (ML) INTRAVENOUS CONTINUOUS PRN
Status: DISCONTINUED | OUTPATIENT
Start: 2023-04-11 | End: 2023-04-11

## 2023-04-11 RX ADMIN — AMLODIPINE BESYLATE 10 MG: 10 TABLET ORAL at 10:04

## 2023-04-11 RX ADMIN — Medication 125 MCG/KG/MIN: at 09:04

## 2023-04-11 RX ADMIN — CEFEPIME 2 G: 2 INJECTION, POWDER, FOR SOLUTION INTRAVENOUS at 01:04

## 2023-04-11 RX ADMIN — Medication: at 10:04

## 2023-04-11 RX ADMIN — Medication 10 ML: at 11:04

## 2023-04-11 RX ADMIN — GLYCOPYRROLATE 0.1 MG: 0.2 INJECTION, SOLUTION INTRAMUSCULAR; INTRAVENOUS at 09:04

## 2023-04-11 RX ADMIN — LIDOCAINE HYDROCHLORIDE 50 MG: 10 INJECTION, SOLUTION INTRAVENOUS at 09:04

## 2023-04-11 RX ADMIN — Medication 10 ML: at 05:04

## 2023-04-11 RX ADMIN — GABAPENTIN 300 MG: 300 CAPSULE ORAL at 03:04

## 2023-04-11 RX ADMIN — ACETAMINOPHEN 650 MG: 325 TABLET ORAL at 05:04

## 2023-04-11 RX ADMIN — PROPOFOL 20 MG: 10 INJECTION, EMULSION INTRAVENOUS at 09:04

## 2023-04-11 RX ADMIN — ALTEPLASE 2 MG: 2.2 INJECTION, POWDER, LYOPHILIZED, FOR SOLUTION INTRAVENOUS at 05:04

## 2023-04-11 RX ADMIN — CLOPIDOGREL BISULFATE 75 MG: 75 TABLET ORAL at 10:04

## 2023-04-11 RX ADMIN — FUROSEMIDE 20 MG: 20 TABLET ORAL at 10:04

## 2023-04-11 RX ADMIN — SODIUM CHLORIDE: 9 INJECTION, SOLUTION INTRAVENOUS at 09:04

## 2023-04-11 RX ADMIN — Medication 10 ML: at 12:04

## 2023-04-11 RX ADMIN — Medication 10 ML: at 06:04

## 2023-04-11 RX ADMIN — MICAFUNGIN SODIUM 100 MG: 100 INJECTION, POWDER, LYOPHILIZED, FOR SOLUTION INTRAVENOUS at 03:04

## 2023-04-11 RX ADMIN — OXYCODONE HYDROCHLORIDE 5 MG: 5 TABLET ORAL at 01:04

## 2023-04-11 RX ADMIN — CEFEPIME 2 G: 2 INJECTION, POWDER, FOR SOLUTION INTRAVENOUS at 02:04

## 2023-04-11 RX ADMIN — OXYCODONE HYDROCHLORIDE 5 MG: 5 TABLET ORAL at 05:04

## 2023-04-11 RX ADMIN — ASPIRIN 81 MG: 81 TABLET, COATED ORAL at 10:04

## 2023-04-11 RX ADMIN — INSULIN ASPART 8 UNITS: 100 INJECTION, SOLUTION INTRAVENOUS; SUBCUTANEOUS at 11:04

## 2023-04-11 RX ADMIN — ACETAMINOPHEN 650 MG: 325 TABLET ORAL at 11:04

## 2023-04-11 NOTE — H&P
Short Stay Endoscopy History and Physical    PCP - [facility]     Procedure - EGD  ASA - per anesthesia  Mallampati - per anesthesia  History of Anesthesia problems - no  Family history Anesthesia problems -  no   Plan of anesthesia - General    HPI:  This is a 82 y.o. female here for evaluation of : Melena and anemia      ROS:  Constitutional: No fevers, chills, No weight loss  CV: No chest pain  Pulm: No cough, No shortness of breath  Ophtho: No vision changes  GI: see HPI  Derm: No rash    Medical History:  has a past medical history of CHF (congestive heart failure), Diabetes mellitus, DVT (deep venous thrombosis), Hypertension, and Miscarriage.    Surgical History:  has a past surgical history that includes Hysterectomy;  femoral vascular surgery ; Creation of iliofemoral artery bypass (Right, 3/20/2019); Incision and drainage (Right, 4/12/2019); Incision and drainage of groin (Right, 5/3/2019); Angiography of lower extremity (Right, 2/23/2023); Creation of femoral-femoral artery bypass with graft (Bilateral, 3/2/2023); Foot amputation through metatarsal (Right, 3/4/2023); Esophagogastroduodenoscopy (N/A, 3/17/2023); Wound debridement (Left, 3/31/2023); Creation of muscle rotational flap (Left, 3/31/2023); and Angiography of lower extremity (Right, 4/4/2023).    Family History: family history includes Diabetes in her father; Hypertension in her father.. Otherwise no colon cancer, inflammatory bowel disease, or GI malignancies.    Social History:  reports that she quit smoking about 43 years ago. Her smoking use included cigarettes. She has never used smokeless tobacco. She reports that she does not drink alcohol and does not use drugs.    Review of patient's allergies indicates:   Allergen Reactions    Motrin [ibuprofen] Itching       Medications:   Medications Prior to Admission   Medication Sig Dispense Refill Last Dose    carvediloL (COREG) 12.5 MG tablet Take 12.5 mg by mouth 2 (two) times  "daily.   4/3/2023 at 2130    amlodipine (NORVASC) 10 MG tablet Take 10 mg by mouth once daily.       apixaban (ELIQUIS) 5 mg Tab Take 1 tablet (5 mg total) by mouth 2 (two) times daily. 60 tablet 0     aspirin (ECOTRIN) 81 MG EC tablet Take 81 mg by mouth once daily.       atorvastatin (LIPITOR) 40 MG tablet Take 1 tablet (40 mg total) by mouth every evening. 30 tablet 0     [] benzonatate (TESSALON) 100 MG capsule Take 1 capsule (100 mg total) by mouth 3 (three) times daily as needed for Cough. 20 capsule 0     BLOOD SUGAR DIAGNOSTIC (TRUE METRIX GLUCOSE TEST STRIP MISC) by Misc.(Non-Drug; Combo Route) route.       cephALEXin (KEFLEX) 500 MG capsule Take 2 capsules (1,000 mg total) by mouth every 12 (twelve) hours. 60 capsule 0     clopidogreL (PLAVIX) 75 mg tablet Take 1 tablet (75 mg total) by mouth once daily. 30 tablet 0     DULoxetine (CYMBALTA) 30 MG capsule Take 1 capsule (30 mg total) by mouth 2 (two) times daily. 60 capsule 0     furosemide (LASIX) 20 MG tablet Take 1 tablet (20 mg total) by mouth once daily. 30 tablet 0     insulin aspart protamine-insulin aspart (NOVOLOG MIX 70-30FLEXPEN U-100) 100 unit/mL (70-30) InPn pen Inject 12 Units into the skin 2 (two) times daily before meals.  0     insulin syringe-needle,dispos. 0.3 mL 30 gauge x 5/16" Syrg by Misc.(Non-Drug; Combo Route) route.       losartan-hydrochlorothiazide 100-25 mg (HYZAAR) 100-25 mg per tablet Take 1 tablet by mouth once daily.       metformin (GLUCOPHAGE) 1000 MG tablet Take 1,000 mg by mouth 2 (two) times daily with meals.       ondansetron (ZOFRAN) 8 MG tablet   0     pantoprazole (PROTONIX) 40 MG tablet Take 1 tablet (40 mg total) by mouth 2 (two) times daily. 60 tablet 1     pen needle, diabetic 32 gauge x 5/32" Ndle Inject 1 each into the skin 4 (four) times daily. 100 each 0        Physical Exam:    Vital Signs:   Vitals:    23 0852   BP: (!) 126/90   Pulse: 84   Resp: 16   Temp: 98.1 °F (36.7 °C)       Gen: NAD, " lying comfortably  HENT: NCAT, oropharynx clear  Eyes: anicteric sclerae, EOMI grossly  Neck: supple, no visible masses/goiter  Cardiac: RRR  Lungs: non-labored breathing  Abd: soft, NT/ND, normoactive BS  Ext: no LE edema, warm, well perfused  Skin: skin intact on exposed body parts, no visible rashes, lesions  Neuro: A&Ox4, neuro exam grossly intact, moves all extremities  Psych: appropriate mood, affect      Labs:  Lab Results   Component Value Date    WBC 8.79 04/11/2023    HGB 7.8 (L) 04/11/2023    HCT 25.2 (L) 04/11/2023     04/11/2023    CHOL 109 (L) 02/28/2023    TRIG 58 02/28/2023    HDL 38 (L) 02/28/2023    ALT 6 (L) 04/11/2023    AST 16 04/11/2023     (L) 04/11/2023    K 3.6 04/11/2023     04/11/2023    CREATININE 0.7 04/11/2023    BUN 14 04/11/2023    CO2 23 04/11/2023    INR 1.2 04/04/2023    HGBA1C 8.2 (H) 02/28/2023       Plan:  EGD for melena and anemia.    I have explained the risks and benefits of endoscopy procedures to the patient including but not limited to bleeding, perforation, infection, and death.  The patient was asked if they understand and allowed to ask any further questions to their satisfaction.      Nazanin Kaiser MD

## 2023-04-11 NOTE — NURSING
Upon finishing up discharge tasks, pt screaming in pain, staling her legs are hurting 10/10. Pt's daughter insisting she cant go home in this pain. Notified team of this, awaiting response.

## 2023-04-11 NOTE — NURSING
Spoke to on call team of pt's pain, Pain meds are delivered to bedside for home use. Pt is good to go home and follow up with follow up appts.

## 2023-04-11 NOTE — NURSING TRANSFER
Nursing Transfer Note      4/11/2023     Reason patient is being transferred: post procedure    Transfer To: 1004, pt returning, report called to bedside RN, Oralia, at the time of this note    Transfer via stretcher    Transported by PCT    Medicines sent: na    Any special needs or follow-up needed: routine    Chart send with patient: Yes    Patient reassessed at: 0936 on 4/11

## 2023-04-11 NOTE — PT/OT/SLP PROGRESS
Physical Therapy      Patient Name:  Nelsy Marino   MRN:  [id number]    Patient not seen today secondary to pt off floor this AM for a procedure. Will follow-up per PT POC.

## 2023-04-11 NOTE — ANESTHESIA POSTPROCEDURE EVALUATION
Anesthesia Post Evaluation    Patient: Nelsy Marino    Procedure(s) Performed: Procedure(s) (LRB):  EGD (ESOPHAGOGASTRODUODENOSCOPY) (N/A)    Final Anesthesia Type: general      Patient location during evaluation: [facility]  Patient participation: Yes- Able to Participate  Level of consciousness: awake and alert and oriented  Post-procedure vital signs: reviewed and stable  Pain management: adequate  Airway patency: patent    PONV status at discharge: No PONV  Anesthetic complications: no      Cardiovascular status: blood pressure returned to baseline and hemodynamically stable  Respiratory status: unassisted, spontaneous ventilation and room air  Hydration status: euvolemic  Follow-up not needed.          Vitals Value Taken Time   /63 04/11/23 1019   Temp 36.3 °C (97.3 °F) 04/11/23 0937   Pulse 81 04/11/23 1019   Resp 21 04/11/23 1019   SpO2 100 % 04/11/23 1019   Vitals shown include unvalidated device data.      Event Time   Out of Recovery 10:00:00         Pain/Ana Maria Score: Pain Rating Prior to Med Admin: 8 (4/11/2023  1:38 PM)  Pain Rating Post Med Admin: 2 (4/11/2023  6:18 AM)  Ana Maria Score: 9 (4/11/2023  9:36 AM)

## 2023-04-11 NOTE — [employer]OVATION PATIENT INSTRUCTIONS
Discharge Summary/Instructions after an Endoscopic Procedure  Patient Name: Nelsy Marino  Patient MRN: [id number]  Patient YOB: 1940  Tuesday, April 11, 2023  Nazanin Kaiser MD  Dear patient,  As a result of recent federal legislation (The Federal Cures Act), you may   receive lab or pathology results from your procedure in your [facility]   account before your physician is able to contact you. Your physician or   their representative will relay the results to you with their   recommendations at their soonest availability.  Thank you,  RESTRICTIONS:  During your procedure today, you received medications for sedation.  These   medications may affect your judgment, balance and coordination.  Therefore,   for 24 hours, you have the following restrictions:   - DO NOT drive a car, operate machinery, make legal/financial decisions,   sign important papers or drink alcohol.    ACTIVITY:  Today: no heavy lifting, straining or running due to procedural   sedation/anesthesia.  The following day: return to full activity including work.  DIET:  Eat and drink normally unless instructed otherwise.     TREATMENT FOR COMMON SIDE EFFECTS:  - Mild abdominal pain, nausea, belching, bloating or excessive gas:  rest,   eat lightly and use a heating pad.  - Sore Throat: treat with throat lozenges and/or gargle with warm salt   water.  - Because air was used during the procedure, expelling large amounts of air   from your rectum or belching is normal.  - If a bowel prep was taken, you may not have a bowel movement for 1-3 days.    This is normal.  SYMPTOMS TO WATCH FOR AND REPORT TO YOUR PHYSICIAN:  1. Abdominal pain or bloating, other than gas cramps.  2. Chest pain.  3. Back pain.  4. Signs of infection such as: chills or fever occurring within 24 hours   after the procedure.  5. Rectal bleeding, which would show as bright red, maroon, or black stools.   (A tablespoon of blood from the rectum is not serious, especially  if   hemorrhoids are present.)  6. Vomiting.  7. Weakness or dizziness.  GO DIRECTLY TO THE NEAREST EMERGENCY ROOM IF YOU HAVE ANY OF THE FOLLOWING:      Difficulty breathing              Chills and/or fever over 101 F   Persistent vomiting and/or vomiting blood   Severe abdominal pain   Severe chest pain   Black, tarry stools   Bleeding- more than one tablespoon   Any other symptom or condition that you feel may need urgent attention  Your doctor recommends these additional instructions:  If any biopsies were taken, your doctors clinic will contact you in 1 to 2   weeks with any results.  - Return patient to hospital [facility] for ongoing care.   - Resume regular diet.   - Resume Eliquis (apixaban) at prior dose today.   For questions, problems or results please call your physician - Nazanin Kaiser MD at Work:  ( [phone].  [facility] [place], EMERGENCY ROOM PHONE NUMBER: [phone]  IF A COMPLICATION OR EMERGENCY SITUATION ARISES AND YOU ARE UNABLE TO REACH   YOUR PHYSICIAN - GO DIRECTLY TO THE EMERGENCY ROOM.  Nazanin Kaiser MD  4/11/2023 9:44:04 AM  This report has been verified and signed electronically.  Dear patient,  As a result of recent federal legislation (The Federal Cures Act), you may   receive lab or pathology results from your procedure in your [facility]   account before your physician is able to contact you. Your physician or   their representative will relay the results to you with their   recommendations at their soonest availability.  Thank you,  PROVATION

## 2023-04-11 NOTE — PROGRESS NOTES
Valentin Lopez - [facility]  Vascular Surgery  Progress Note    Patient Name: Nelsy Marino  MRN: [id number]  Admission Date: 3/29/2023  Primary Care Provider: Juan F Garay    Subjective:     Interval History: NAEO. Patient NPO since midnight for EGD with GI this morning. No complaints this morning.    Post-Op Info:  Procedure(s) (LRB):  Angiogram Extremity Unilateral - possible endovascular intervention (Right)   7 Days Post-Op       Medications:  Continuous Infusions:  Scheduled Meds:   acetaminophen  650 mg Oral Q6H    amLODIPine  10 mg Oral Daily    ammonium lactate   Topical (Top) Daily    aspirin  81 mg Oral Daily    atorvastatin  40 mg Oral QHS    carvediloL  12.5 mg Oral BID    ceFEPime (MAXIPIME) IVPB  2 g Intravenous Q12H    clopidogreL  75 mg Oral Daily    DULoxetine  30 mg Oral BID    furosemide  20 mg Oral Daily    gabapentin  300 mg Oral TID    micafungin (MYCAMINE) IVPB  100 mg Intravenous Q24H    pantoprazole  40 mg Intravenous BID    sodium chloride 0.9%  10 mL Intravenous Q6H     PRN Meds:sodium chloride, sodium chloride, dextrose 10%, glucagon (human recombinant), insulin aspart U-100, ondansetron, oxyCODONE, sodium chloride 0.9%, [place] PICC Protocol **AND** sodium chloride 0.9% **AND** sodium chloride 0.9%     Objective:     Vital Signs (Most Recent):  Temp: 98.1 °F (36.7 °C) (04/11/23 0335)  Pulse: 83 (04/11/23 0335)  Resp: 18 (04/11/23 0335)  BP: (!) 142/64 (04/11/23 0335)  SpO2: (!) 94 % (04/11/23 0335)   Vital Signs (24h Range):  Temp:  [98.1 °F (36.7 °C)-99.6 °F (37.6 °C)] 98.1 °F (36.7 °C)  Pulse:  [62-92] 83  Resp:  [16-18] 18  SpO2:  [92 %-99 %] 94 %  BP: (112-147)/(58-81) 142/64           Physical Exam  Constitutional:       General: She is not in acute distress.     Appearance: She is not ill-appearing.   HENT:      Head: Normocephalic.      Mouth/Throat:      Mouth: Mucous membranes are moist.   Cardiovascular:      Rate and Rhythm: Normal rate.      Comments:   Left: AT, PT  triphasic signal    Pulmonary:      Effort: Pulmonary effort is normal. No respiratory distress.   Abdominal:      Palpations: Abdomen is soft.   Musculoskeletal:         General: Normal range of motion.   Skin:     General: Skin is warm and dry.      Capillary Refill: Capillary refill takes less than 2 seconds.      Comments: Left groin incision with wet to dry dressing in place   Neurological:      General: No focal deficit present.      Mental Status: She is alert.       Significant Labs:  CBC:   Recent Labs   Lab 04/11/23  0341   WBC 8.79   RBC 2.87*   HGB 7.8*   HCT 25.2*      MCV 88   MCH 27.2   MCHC 31.0*       CMP:   Recent Labs   Lab 04/11/23  0341   *   CALCIUM 8.5*   ALBUMIN 1.9*   PROT 6.2   *   K 3.6   CO2 23      BUN 14   CREATININE 0.7   ALKPHOS 62   ALT 6*   AST 16   BILITOT 0.6         Significant Diagnostics:  I have reviewed all pertinent imaging results/findings within the past 24 hours.    Assessment/Plan:     PVD (peripheral vascular disease)  Nelsy Marino is a 82 y.o. woman with history of HTN, T2DM, DVT, and PAD who underwent left to right fem to SFA bypass and right SFA angioplasty on 3/2/23 and was admitted until 3/20. She was seen by Dr. Dunbar in the office earlier today complaining of left rest pain and also found to have dehiscence of her left groin incision.  S/p wound washout and flip with plastics 3/31, LLE angio on 4/4.     - patient seen and examined  - continue aspirin and plavix, as ordered  - Eliquis held given persistent nose bleed - patient cannot tolerate Eliquis  - Insulin SS  - Home meds  - Asa, plavix, statin  - Podiatry following for right TMA wound  - Plan for EGD with GI today   - Okay for d/c after EGD  - Awaiting insurance authorization for LTACH   - Plan to d/c today after EGD if insurance approved    ID Plan:  Recommendations / Plan:   Continue IV-micafungin 100mg Q24 (cxs +C.glabrata; +C.albicans)   Continue IV-Cefepime 2 g Q12h  (renally dosed CrCl 34)  -- [Note: recent h/o R foot TMA wound dehiscence cx+ PSA (03/10/23), s/p cipro course for SSTI].   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   -- ID will schedule pt for f/u appt in ID clinic in 2-3wks    Dispo: LTAC accepted pending insurance auth, has PICC for IV abx        Leonidas Dallas MD  Vascular Surgery  [facility]

## 2023-04-11 NOTE — DISCHARGE SUMMARY
Valentin parrish - [facility]  Vascular Surgery  Discharge Summary      Patient Name: Nelsy Marino  MRN: [id number]  Admission Date: 3/29/2023  Hospital Length of Stay: 13 days  Discharge Date and Time:  04/11/2023 3:14 PM  Attending Physician: Danny Dunbar MD   Discharging Provider: Freddy Murillo MD  Primary Care Provider: [facility]    HPI:   Nelsy Marino is a 82 y.o. woman with history of HTN, T2DM, DVT, and PAD who underwent left to right fem to SFA bypass and right SFA angioplasty on 3/2/23 and was admitted until 3/20. She was seen by Dr. Dunbar in the office earlier today complaining of left wrist pain and also found to have dehiscence of her left groin incision.  She had a postoperative ARCHIE performed on 03/16 which demonstrated a toe pressure of 0 on the left foot and waveforms and PVR concerning for severe occlusive disease.    On my exam, patient is continuing to have rest pain, per her  she is minimally mobile at home with full assist for any activity.  Denies any fevers, chills, sweats.  Has been using supplemental oxygen 2 L at home however is 99% on room air in the emergency department without any supplemental.  The spouse states that the left groin incision dehisced proximally 3 days ago and has had fibrinous debris on the gauze.  No specific expressed purulence.    Labs are slightly improved from previous 9 days ago (WBC 12 from 13, CRP 38 from 200); CTA with focal occlusion of CFA just distal to takeoff of new bypass at the level of the previous with reconstitution and flow distal via 3 vessel runoff. The Fem-SFA bypass is open and patent to foot. Dehiscence on scan does not appear to involve graft but there are inflammatory changes in the area and hard to definitively rule out graft infection.     Exam monophasic PT/DP on left, triphasic DP, biphasic PT on right. Wound with pain on palpation, sloughing tissue and dead fat, serous fluid from short cavity (did not probe).        Procedure(s) (LRB):  EGD (ESOPHAGOGASTRODUODENOSCOPY) (N/A)     Hospital Course: Nelsy Marino is an 82yoF who presented with right groin wound dehiscence following a fem-fem bypass surgery earlier this month. She was taken to the operating room for wound debridement. Intra-operatively, the patient was found to have exposed graft at the debrided wound bed. Plastic surgery was consulted and performed a rectus femoris flap at that time for graft coverage. She was admitted to the floor and progressed. She maintained distal left lower extremity pulses. Plastic surgery followed along with the patient for wound management. Unfortunately, the patient was intolerant to her eliquis. She continued to have epistasis and GI bleeds, thus the decision was made to discontinue the eliquis treatment. She underwent EGD x2 while in house. We initially were waiting on insurance approval for LTAC, but the insurance company did not approve. She has home health set up for outpatient infusion, physical therapy and occupational therapy. Infusion education was provided to her , her niece and her sister.       Goals of Care Treatment Preferences:  Code Status: Full Code      Consults:   Consults (From admission, onward)        Status Ordering Provider     Inpatient consult to Gastroenterology  Once        Provider:  (Not yet assigned)    Completed SEAN LOPEZ     [facility]  Once        Provider:  (Not yet assigned)    Completed MAYCOL HIRSCH     Inpatient consult to Midline team  Once        Provider:  (Not yet assigned)    Completed AMEYA LAI     Inpatient consult to Infectious Diseases  Once        Provider:  (Not yet assigned)    Completed CINDY WHITAKER     Inpatient consult to Podiatry  Once        Provider:  (Not yet assigned)    Completed CINDY WHITAKER     Inpatient consult to Vascular Surgery  Once        Provider:  (Not yet assigned)    Completed LYNDSEY  ADITI          Significant Diagnostic Studies: Labs: All labs within the past 24 hours have been reviewed    Pending Diagnostic Studies:     None        Final Active Diagnoses:    Diagnosis Date Noted POA    PRINCIPAL PROBLEM:  Wound dehiscence [T81.30XA] 04/11/2019 Yes    Acute on chronic anemia [D64.9] 04/08/2023 Yes    Eschar of foot [R23.4] 03/30/2023 Yes    PVD (peripheral vascular disease) [I73.9] 03/20/2019 Yes     Chronic      Problems Resolved During this Admission:      Discharged Condition: good    Disposition: Home or Self Care    Follow Up:   Follow-up Information     [facility] PLASTIC SURGERY Follow up in 1 week(s).    Specialty: Plastic Surgery  Contact information:  [facility]  [facility] [id number]  [phone]           NURSE SCHEDULE, [facility] PODIATRY Follow up.           Danny Dunbar MD Follow up in 2 week(s).    Specialties: Vascular Surgery, Surgery  Contact information:  [address] [place] [address] [id number] [phone]                         Patient Instructions:      Diet Adult Regular     Lifting restrictions   Order Comments: No lifting greater than 10 pounds for 6 weeks from day of surgery.  No pushing/pulling such as vacuuming or raking.  No straining, avoid constipation and take stool softeners as described and laxatives as needed.  No driving while on narcotics and until you can react quickly without pain.     Other restrictions (specify):   Order Comments: Post-op Instructions  Please take pain medication as needed, if taking narcotics please avoid driving.   We recommend a high fiber diet and use of stool softeners while on narcotics as they might cause constipation.    You may leave the dressing in place for 48 hours.   Okay to shower in 24 hours   Avoid swimming pools, baths or hot tubs for at least 2 weeks.   Do not lift anything heavier than 10 lb for at least 2 weeks.   Please call if you have fevers, chills, shortness of breath, increased  "drainage from your wound or bleeding.  Please call to make an appointment in 2 weeks for wound recheck.     Notify your health care provider if you experience any of the following:  temperature >100.4     Notify your health care provider if you experience any of the following:  persistent nausea and vomiting or diarrhea     Notify your health care provider if you experience any of the following:  severe uncontrolled pain     Notify your health care provider if you experience any of the following:  redness, tenderness, or signs of infection (pain, swelling, redness, odor or green/yellow discharge around incision site)     Notify your health care provider if you experience any of the following:  difficulty breathing or increased cough     Notify your health care provider if you experience any of the following:  severe persistent headache     Change dressing (specify)   Order Comments: Dressing change: 2 times per day using wet-to-dry gauze dressing.     Activity as tolerated     Medications:  Reconciled Home Medications:      Medication List      START taking these medications    oxyCODONE 5 MG immediate release tablet  Commonly known as: ROXICODONE  Take 1 tablet (5 mg total) by mouth every 6 (six) hours as needed for Pain.        CONTINUE taking these medications    amLODIPine 10 MG tablet  Commonly known as: NORVASC  Take 10 mg by mouth once daily.     aspirin 81 MG EC tablet  Commonly known as: ECOTRIN  Take 81 mg by mouth once daily.     atorvastatin 40 MG tablet  Commonly known as: LIPITOR  Take 1 tablet (40 mg total) by mouth every evening.     BD SHERINE 2ND GEN PEN NEEDLE 32 gauge x 5/32" Ndle  Generic drug: pen needle, diabetic  Inject 1 each into the skin 4 (four) times daily.     benzonatate 100 MG capsule  Commonly known as: TESSALON  Take 1 capsule (100 mg total) by mouth 3 (three) times daily as needed for Cough.     carvediloL 12.5 MG tablet  Commonly known as: COREG  Take 12.5 mg by mouth 2 (two) times " "daily.     cephALEXin 500 MG capsule  Commonly known as: KEFLEX  Take 2 capsules (1,000 mg total) by mouth every 12 (twelve) hours.     clopidogreL 75 mg tablet  Commonly known as: PLAVIX  Take 1 tablet (75 mg total) by mouth once daily.     DULoxetine 30 MG capsule  Commonly known as: CYMBALTA  Take 1 capsule (30 mg total) by mouth 2 (two) times daily.     furosemide 20 MG tablet  Commonly known as: LASIX  Take 1 tablet (20 mg total) by mouth once daily.     insulin aspart protamine-insulin aspart 100 unit/mL (70-30) Inpn pen  Commonly known as: NovoLOG Mix 70-30FlexPen U-100  Inject 12 Units into the skin 2 (two) times daily before meals.     insulin syringe-needle,dispos. 0.3 mL 30 gauge x 5/16" Syrg  by Misc.(Non-Drug; Combo Route) route.     losartan-hydrochlorothiazide 100-25 mg 100-25 mg per tablet  Commonly known as: HYZAAR  Take 1 tablet by mouth once daily.     metFORMIN 1000 MG tablet  Commonly known as: GLUCOPHAGE  Take 1,000 mg by mouth 2 (two) times daily with meals.     ondansetron 8 MG tablet  Commonly known as: ZOFRAN     pantoprazole 40 MG tablet  Commonly known as: PROTONIX  Take 1 tablet (40 mg total) by mouth 2 (two) times daily.     TRUE METRIX GLUCOSE TEST STRIP MISC  by Misc.(Non-Drug; Combo Route) route.        STOP taking these medications    ELIQUIS 5 mg Tab  Generic drug: apixaban            Freddy Murillo MD  Vascular Surgery  [facility]  "

## 2023-04-11 NOTE — PLAN OF CARE
Pt Aox4, VS remained stable throughout shift, pain controlled with PRN meds. Pt has purewick for urinary elimination, diaper for bowel movement. However, pt was not able to void this shift, so a bladder scan was done that showed 437mL of urine in the bladder. Notified surgery on-call, and she said to give pt more time. If she hasn't voided by 10 a.m., call MD back for order to do a one time in/out cath.  Pt turned self with assistance, and v/u of need to turn. Dressing to left groin C/D/I. Pt scheduled for EGD in a.m., NPO after midnight. Will d/c to LTAC or home with home health after procedure in a.m., pending insurance approval. Educated pt and family on POC, resting comfortably, bed low and locked, call light within reach, will continue to monitor.     Problem: Adult Inpatient Plan of Care  Goal: Plan of Care Review  Outcome: Ongoing, Progressing  Goal: Patient-Specific Goal (Individualized)  Outcome: Ongoing, Progressing  Goal: Absence of Hospital-Acquired Illness or Injury  Outcome: Ongoing, Progressing  Goal: Optimal Comfort and Wellbeing  Outcome: Ongoing, Progressing  Goal: Readiness for Transition of Care  Outcome: Ongoing, Progressing     Problem: Diabetes Comorbidity  Goal: Blood Glucose Level Within Targeted Range  Outcome: Ongoing, Progressing     Problem: Adjustment to Illness (Sepsis/Septic Shock)  Goal: Optimal Coping  Outcome: Ongoing, Progressing     Problem: Bleeding (Sepsis/Septic Shock)  Goal: Absence of Bleeding  Outcome: Ongoing, Progressing     Problem: Glycemic Control Impaired (Sepsis/Septic Shock)  Goal: Blood Glucose Level Within Desired Range  Outcome: Ongoing, Progressing     Problem: Infection Progression (Sepsis/Septic Shock)  Goal: Absence of Infection Signs and Symptoms  Outcome: Ongoing, Progressing     Problem: Nutrition Impaired (Sepsis/Septic Shock)  Goal: Optimal Nutrition Intake  Outcome: Ongoing, Progressing     Problem: Skin Injury Risk Increased  Goal: Skin Health and  Integrity  Outcome: Ongoing, Progressing     Problem: Fall Injury Risk  Goal: Absence of Fall and Fall-Related Injury  Outcome: Ongoing, Progressing     Problem: Infection  Goal: Absence of Infection Signs and Symptoms  Outcome: Ongoing, Progressing

## 2023-04-11 NOTE — TREATMENT PLAN
GI Treatment Plan:    Findings:        The examined esophagus was normal.        Three small angioectasias with no bleeding were found in the gastric        body. Coagulation for bleeding prevention using argon plasma was        successful.        A single small angioectasia without bleeding was found in the        duodenal bulb. Coagulation for bleeding prevention using argon        plasma was successful.        The exam was otherwise without abnormality.   Impression:            - Normal esophagus.                          - Three non-bleeding angioectasias in the stomach.                          Treated with argon plasma coagulation (APC).                          - A single non-bleeding angioectasia in the                          duodenum. Treated with argon plasma coagulation                          (APC).   Recommendation:        - Return patient to hospital rubi for ongoing care.                          - Resume regular diet.                          - Resume Eliquis (apixaban) at prior dose today.     Please call back with questions or if patient has change in clinical status.    Kevin Rivas MD  Gastroenterology/Hepatology, PGY IV  [facility]

## 2023-04-11 NOTE — PLAN OF CARE
Valentin Loepz [facility]  Discharge Final Note    Primary Care Provider: Juan F Garay    Expected Discharge Date: 4/11/2023    Patient will be discharged with [facility] and [facility]  Ambulatory referrals have been sent to [facility] and [facility]     Final Discharge Note (most recent)       Final Note - 04/11/23 1558          Final Note    Assessment Type Final Discharge Note     Anticipated Discharge Disposition Home-Health Care [facility]     Hospital Resources/Appts/Education Provided Appointments scheduled and added to AVS        Post-Acute Status    Post-Acute Authorization Home Health;IV Infusion     Home Health Status Set-up Complete/Auth obtained     IV Infusion Status Set-up Complete/Auth obtained                   Contact Info       [facility] PLASTIC SURGERY   Specialty: Plastic Surgery    [address] [facility] [address]   Phone: [phone]       Next Steps: Follow up in 1 week(s)    Nurse Schedule, [facility] Podiatry        Next Steps: Follow up    Danny Dunbar MD   Specialty: Vascular Surgery, Surgery    [address] [place] [id number]   Phone: [phone]       Next Steps: Follow up on 5/16/2023    Instructions: Appt at 9:30AM          Sonya Villalpando RN, CM   Ext: [phone]

## 2023-04-11 NOTE — HOSPITAL COURSE
Nelsy Marino is an 82yoF who presented with right groin wound dehiscence following a fem-fem bypass surgery earlier this month. She was taken to the operating room for wound debridement. Intra-operatively, the patient was found to have exposed graft at the debrided wound bed. Plastic surgery was consulted and performed a rectus femoris flap at that time for graft coverage. She was admitted to the floor and progressed. She maintained distal left lower extremity pulses. Plastic surgery followed along with the patient for wound management. Unfortunately, the patient was intolerant to her eliquis. She continued to have epistasis and GI bleeds, thus the decision was made to discontinue the eliquis treatment. She underwent EGD x2 while in house. We initially were waiting on insurance approval for LTAC, but the insurance company did not approve. She has home health set up for outpatient infusion, physical therapy and occupational therapy. Infusion education was provided to her , her niece and her sister.

## 2023-04-11 NOTE — PROGRESS NOTES
[facility] Home IV ATB Education/Discharge Planning Note:     [facility] educator met with the patient, patient's , and patient's additional family member and discussed discharge plan for home IV ATB. Nelsy Marino will discharge home with family support but will not self infuse. Patient's IV medications will both infuse via Elastomeric Pump. The patient's  and family members were educated on S.A.S.H procedure & [facility] S.A.S.H mat provided. Patient education checklist reviewed and acknowledged by the patient and her family and they are agreeable to discharge with home infusion plan of care. IV administration process using aspetic technique was reviewed with successful return demonstration by the patient's . The patient's  feels more comfortable with home infusion process after bedside education provided. Patient's  also comfortable enough with infusing without assistance of nurse, as he and the patient are aware that they must infuse a dose of cefepime this evening. Patient will discharge home with IV Cefepime 2g Q12 hours + IV Micafungin 100mg Q24 hours for estimated end of therapy date of 5/16/2023. Dosing schedule time for the IV Cefepime will be 21:00/09:00 daily, beginning with first home dose due at 21:00 on 4/11/2023; I confirmed that patient received a dose prior to discharge on 4/11/2023. Dosing schedule time for the IV Micafungin will be 10:00 daily, beginning with first home dose due at 10:00 on 4/12/2023; I confirmed that patient received a dose prior to discharge on 4/11/2023. Extension sets not warranted on DL PICC, as patient will not be self infusing. Patient accepted to care by [facility] for weekly dressing changes and lab draws.     Time allotted for questions; questions addressed appropriately. Patients home IV ATB & supplies will be delivered to the patient's home. Provided patient with [facility]  support number [phone] & [facility] HH number [phone]. Patient is ready for discharge home from [facility] perspective. Patient's discharge planning team and bedside nurse updated with the information above.      -Patient has been accepted to care by [facility] and report called to Geena; at the time of my call for report, Geena stated she was not aware that patient's plan had shifted back from placement at LTAC to home. Geena to follow up with CM regarding information needed.  - Phone number [phone]     Please do not hesitate to reach out for any additional needs.    Ricki Dickinson MS, RDN, LDN  Clinical Liaison & Dietitian  [facility]   Phone: [phone]  [email or web address]

## 2023-04-11 NOTE — NURSING
Printed and reviewed AVS/discharge instructions with patient and pt's spouse. Awaiting teachings on pump. Pt set up with home billy currently. Awaiting to give dose of antifungal prior to d/c. Pt spouse is on board with the process.

## 2023-04-11 NOTE — NURSING
Paged on call team for Dr. Bettina MD of pt's pain and not wanting to be d/c today. Awaiting call back.

## 2023-04-11 NOTE — PHYSICIAN QUERY
PT Name: Nelsy Marino  MR #: [id number]    DOCUMENTATION CLARIFICATION     CDS/: Maude LambertRN              Contact information: [email or web address]    This form is a permanent document in the medical record.     Query Date: April 11, 2023    By submitting this query, we are merely seeking further clarification of documentation.. Please utilize your independent clinical judgment when addressing the question(s) below.    The medical record contains the following:   Indicators  Supporting Clinical Findings Location in Medical Record   x Energy Intake <50% of estimated energy requirement for x5 days   Nutrition PN 4/7   x Weight Loss -9% x 1mo   Nutrition PN 4/7   x Fat Loss mild and moderate depletion of orbitals   Nutrition PN 4/7   x Muscle Loss mild and moderate depletion of temples   Nutrition PN 4/7    Edema/Fluid Accumulation      Reduced  Strength (by dynamometer)     x Weight, BMI, Usual Body Weight Weight: 67 kg (147 lb 11.3 oz)  Weight (lb): 147.71 lb  BMI (Calculated): 25.3   Nutrition PN 4/7   x Delayed Wound Healing Wound dehiscence; S/p wound washout and flip with plastics 3/31   Nutrition PN 4/7   x Registered Dietician Diagnosis Pt meets criteria for severe malnutrition d/t wt loss and poor PO intake.   Nutrition PN 4/7   x Acute or Chronic Illness Severe Protein-Calorie Malnutrition  Malnutrition in the context of Acute Illness/Injury   Nutrition PN 4/7    Social or Environmental Circumstances     x Treatment 1.) Recommend continuing with Diabetic Diet, 2,000Kcal as tolerated- encourage PO intake.     2.) Recommend continuing with Boost Glucose Control TID to provide an additional 750kcal and 42gPRO- encourage PO intake.     3.) Recommend considering an appetite stimulant, if appropriate.     4.) Recommend daily wts to monitor.     5.)  RD to monitor wt, PO intake, skin, labs. Nutrition PN 4/7                            Other       Academy of Nutrition and Dietetics (Academy) and  the American Society for Parenteral and Enteral Nutrition (A.S.P.E.N.) Clinical Characteristics to support Malnutrition   Malnutrition in the Context of Acute Illness or Injury Malnutrition in the Context of Chronic Illness or Injury Malnutrition in the Context of Social or Environmental Circumstances   Malnutrition Level Moderate Severe Moderate Severe   Moderate   Severe   Energy Intake <75%                   >7 days <50%                 >5 days <75%           >1 month <75%                      >1 month   <75% for >3 months   <50% for >1 month   Weight Loss   1-2% in 1 week >2% in 1 week 5% in 1 month >5% in 1 month 5% in 1 month >5% in 1 month    5% in 1 month >5% in 1 month 7.5% in 3 months >7.5% in 3 months 7.5% in 3 months >7.5% in 3 months    7.5% in 3 months >7.5% in 3 months 10% in 6 months >10% in 6 months 10% in 6 months >10% in 6 months        20% in 1 year                    >20% in 1 year                                                                  20% in 1 year                            >20% in 1 year                                                  Subcutaneous Fat Loss Mild  Moderate  Mild  Severe    Mild   Severe   Muscle Loss Mild  Moderate  Mild  Severe    Mild   Severe   Edema/Fluid Accumulation Mild Moderate to severe  Mild  Severe   Mild   Severe   Reduced  Strength         (based on standards supplied by  of dynamometer) N/A Measurably reduced N/A Measurably reduced N/A Measurably reduced     Criteria for mild malnutrition is defined as 1 characteristic outlined above within the established moderate or severe parameters.  A minimum of 2 out of the 6 characteristics noted above are recommended for a diagnosis of moderate or severe malnutrition.  Chronic illness/injury is a disease/condition lasting 3 months or longer.    The noted clinical guidelines are only system guidelines and do not replace the providers clinical judgment.    Provider, please specify diagnosis or  diagnoses associated with above clinical findings.    [  x] Severe Malnutrition - a minimum of 2 of the 6 severe malnutrition characteristics noted above      [  ] Other Nutritional Diagnosis (please specify): _______         Please document in your progress notes daily for the duration of treatment until resolved and  include in your discharge summary.      References:    ECCIL Mccord, & TENZIN Branch (2022, April). Assessment and management of anorexia and cachexia in palliative care. Retrieved May 23, 2022, from https://www.[employer]/contents/assessment-and-management-of-anorexia-and-cachexia-in-palliative-care?kddqvMdr=3344&source=see_link     CLAUDE Presley, PhD, RD, Ac GALLEGO P., PhD, RN, MATTHEW Thibodeaux MD, PhD, Dominick DAVIDSON A., MS, RD, [facility], LISANDRO Peterosn, MS, RD, The Academy Malnutrition Work Group, The A.S.P.E.N. Board of Directors. (2012). Consensus Statement: Academy of Nutrition and Dietetics and American Society for Parenteral and Enteral Nutrition: Characteristics Recommended for the Identification and Documentation of Adult Malnutrition (Undernutrition). Journal of Parenteral and Enteral Nutrition, 36(3), 275-283. doi:10.1177/6448925726057967     Form No. 14161

## 2023-04-11 NOTE — NURSING TRANSFER
Nursing Transfer Note      4/11/2023     Reason patient is being transferred: unit 1004    Transfer From: endo    Transfer via stretcher    Transfer with n/a    Transported by tech    Medicines sent: n/a    Any special needs or follow-up needed: n/a    Chart send with patient: Yes    Notified: spouse, daughter    Upon arrival to floor: patient oriented to room, call bell in reach, and bed in lowest position, vitals maintained, will continue pt care for remainder of shift.

## 2023-04-11 NOTE — ASSESSMENT & PLAN NOTE
Nelsy Marino is a 82 y.o. woman with history of HTN, T2DM, DVT, and PAD who underwent left to right fem to SFA bypass and right SFA angioplasty on 3/2/23 and was admitted until 3/20. She was seen by Dr. Dunbar in the office earlier today complaining of left rest pain and also found to have dehiscence of her left groin incision.  S/p wound washout and flip with plastics 3/31, LLE angio on 4/4.     - patient seen and examined  - continue aspirin and plavix, as ordered  - Eliquis held given persistent nose bleed - patient cannot tolerate Eliquis  - Insulin SS  - Home meds  - Asa, plavix, statin  - Podiatry following for right TMA wound  - Plan for EGD with GI today   - Okay for d/c after EGD  - Awaiting insurance authorization for LTACH   - Plan to d/c today after EGD if insurance approved    ID Plan:  Recommendations / Plan:   Continue IV-micafungin 100mg Q24 (cxs +C.glabrata; +C.albicans)   Continue IV-Cefepime 2 g Q12h (renally dosed CrCl 34)  -- [Note: recent h/o R foot TMA wound dehiscence cx+ PSA (03/10/23), s/p cipro course for SSTI].   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   -- ID will schedule pt for f/u appt in ID clinic in 2-3wks    Dispo: LTAC accepted, has PICC for IV abx

## 2023-04-11 NOTE — NURSING
Wound care to left groin and right foot complete.cleansed with normal saline Applied xerofoam, gauze, abd pad and tape to left groin area, cleansed right foot with normal saline betadine, gauze, kerlix and ace bandage. Supplies provided for pt to discharge home with. Antifungal infusing currently. Conatcted team to schedule podiatry and plastic sx appts. Awaiting those to be complete prior to d/c. Home billy on board. Spouse awaitng for teaching for home infusions. Avs reprinted for pt and family.

## 2023-04-11 NOTE — NURSING TRANSFER
Nursing Transfer Note      4/11/2023     Reason patient is being transferred: ENDO/EGD    Transfer To: ENDO    Transfer via stretcher    Transfer with N/A    Transported by TECH    Medicines sent: N/A    Any special needs or follow-up needed: N/A    Chart send with patient: Yes    Notified: spouse    Pt off unit

## 2023-04-11 NOTE — SUBJECTIVE & OBJECTIVE
Medications:  Continuous Infusions:  Scheduled Meds:   acetaminophen  650 mg Oral Q6H    amLODIPine  10 mg Oral Daily    ammonium lactate   Topical (Top) Daily    aspirin  81 mg Oral Daily    atorvastatin  40 mg Oral QHS    carvediloL  12.5 mg Oral BID    ceFEPime (MAXIPIME) IVPB  2 g Intravenous Q12H    clopidogreL  75 mg Oral Daily    DULoxetine  30 mg Oral BID    furosemide  20 mg Oral Daily    gabapentin  300 mg Oral TID    micafungin (MYCAMINE) IVPB  100 mg Intravenous Q24H    pantoprazole  40 mg Intravenous BID    sodium chloride 0.9%  10 mL Intravenous Q6H     PRN Meds:sodium chloride, sodium chloride, dextrose 10%, glucagon (human recombinant), insulin aspart U-100, ondansetron, oxyCODONE, sodium chloride 0.9%, Flushing PICC Protocol **AND** sodium chloride 0.9% **AND** sodium chloride 0.9%     Objective:     Vital Signs (Most Recent):  Temp: 98.1 °F (36.7 °C) (04/11/23 0335)  Pulse: 83 (04/11/23 0335)  Resp: 18 (04/11/23 0335)  BP: (!) 142/64 (04/11/23 0335)  SpO2: (!) 94 % (04/11/23 0335)   Vital Signs (24h Range):  Temp:  [98.1 °F (36.7 °C)-99.6 °F (37.6 °C)] 98.1 °F (36.7 °C)  Pulse:  [62-92] 83  Resp:  [16-18] 18  SpO2:  [92 %-99 %] 94 %  BP: (112-147)/(58-81) 142/64           Physical Exam  Constitutional:       General: She is not in acute distress.     Appearance: She is not ill-appearing.   HENT:      Head: Normocephalic.      Mouth/Throat:      Mouth: Mucous membranes are moist.   Cardiovascular:      Rate and Rhythm: Normal rate.      Comments:   Left: AT, PT triphasic signal    Pulmonary:      Effort: Pulmonary effort is normal. No respiratory distress.   Abdominal:      Palpations: Abdomen is soft.   Musculoskeletal:         General: Normal range of motion.   Skin:     General: Skin is warm and dry.      Capillary Refill: Capillary refill takes less than 2 seconds.      Comments: Left groin incision with wet to dry dressing in place   Neurological:      General: No focal deficit present.       Mental Status: She is alert.       Significant Labs:  CBC:   Recent Labs   Lab 04/11/23  0341   WBC 8.79   RBC 2.87*   HGB 7.8*   HCT 25.2*      MCV 88   MCH 27.2   MCHC 31.0*       CMP:   Recent Labs   Lab 04/11/23  0341   *   CALCIUM 8.5*   ALBUMIN 1.9*   PROT 6.2   *   K 3.6   CO2 23      BUN 14   CREATININE 0.7   ALKPHOS 62   ALT 6*   AST 16   BILITOT 0.6         Significant Diagnostics:  I have reviewed all pertinent imaging results/findings within the past 24 hours.

## 2023-04-12 ENCOUNTER — PATIENT OUTREACH (OUTPATIENT)
Dept: ADMINISTRATIVE | Facility: CLINIC | Age: 83
End: 2023-04-12
Payer: MEDICARE

## 2023-04-12 NOTE — PROGRESS NOTES
C3 nurse spoke with Nelsy Marino  for a TCC post hospital discharge follow up call. The patient reports does not have a scheduled HOSFU appointment. C3 nurse was unable to schedule [facility]. Patient advised to contact their PCP to schedule a HOSPFU within 5-7 days.

## 2023-04-12 NOTE — TELEPHONE ENCOUNTER
Pt and her  were very vague about medications. I placed a call to [facility] and  states a nurse will be out to see patient within 2-3 hours time and will review discharge instructions with pt and  as well.

## 2023-04-12 NOTE — PLAN OF CARE
[facility]    HOME HEALTH ORDERS      Patient Name: Nelsy Marino  YOB: 1940    PCP: Juan F Garay   PCP Address: [address] / [address]  PCP Phone Number: [phone]  PCP Fax: [phone]    Encounter Date: 04/12/2023    Admit to Home Health    Diagnoses:  Active Hospital Problems    Diagnosis  POA    *Wound dehiscence [T81.30XA]  Yes    Acute on chronic anemia [D64.9]  Yes    Eschar of foot [R23.4]  Yes    PVD (peripheral vascular disease) [I73.9]  Yes     Chronic      Resolved Hospital Problems   No resolved problems to display.       Future Appointments   Date Time Provider Department Center   4/14/2023  1:30 PM Nain Hopper MD [facility] ID [facility]   5/16/2023  9:30 AM Danny Dunbar MD [facility]   5/17/2023  1:30 PM Nain Hopper MD [facility]      Follow-up Information       [facility] PLASTIC SURGERY Follow up in 1 week(s).    Specialty: Plastic Surgery  Contact information:  [address]  [facility] [address]  [phone]             NURSE SCHEDULE, [facility] PODIATRY Follow up.               Danny Dunbar MD Follow up on 5/16/2023.    Specialties: Vascular Surgery, Surgery  Why: Appt at 9:30AM  Contact information:  [address] [id number] [phone]                               I have seen and examined this patient face to face today. My clinical findings that support the need for the home health skilled services and home bound status are the following:  Weakness/numbness causing balance and gait disturbance due to Surgery making it taxing to leave home.    Allergies:  Review of patient's allergies indicates:   Allergen Reactions    Motrin [ibuprofen] Itching       Diet:  cardiac diet    Activities: activity as tolerated  Weight bearing with surgical shoes  No lifting greater than 10 pounds for 6 weeks from day of surgery.  No pushing/pulling such as vacuuming or raking.  No straining,  avoid constipation and take stool softeners as described and laxatives as needed.  No driving while on narcotics and until you can react quickly without pain.    Nursing:   Agency to admit patient within 24 hours of hospital discharge unless specified on physician order or at patient request     SN to complete comprehensive assessment including routine vital signs. Instruct on disease process and s/s of complications to report to MD. Review/verify medication list sent home with the patient at time of discharge  and instruct patient/caregiver as needed. Frequency may be adjusted depending on start of care date.      Skilled nurse to perform up to 3 visits PRN for symptoms related to diagnosis     Notify MD if SBP > 160 or < 90; DBP > 90 or < 50; HR > 120 or < 50; Temp > 101; O2 < 88%; Other:        Ok to schedule additional visits based on staff availability and patient request on consecutive days within the home health episode.      CONSULTS:    Physical Therapy to evaluate and treat. Evaluate for home safety and equipment needs; Establish/upgrade home exercise program. Perform / instruct on therapeutic exercises, gait training, transfer training, and Range of Motion.  Occupational Therapy to evaluate and treat. Evaluate home environment for safety and equipment needs. Perform/Instruct on transfers, ADL training, ROM, and therapeutic exercises.  Aide to provide assistance with personal care, ADLs, and vital signs.   Three times weekly for each PT/OT/HH aide    MISCELLANEOUS CARE:  Home Infusion Therapy:   SN to perform Infusion Therapy/Central Line Care.  Review Central Line Care & Central Line Flush with patient.     Micafungin 100mg Q 24 hour IV for six weeks  Cefipime 2g Q 12 hour IV for       -- ID will schedule pt for f/u appt in ID clinic in 2-3wks     Scrub the Hub: Prior to accessing the line, always perform a 30 second alcohol scrub  Each lumen of the central line is to be flushed at least daily with 10 mL  Normal Saline and 3 mL Heparin flush (10 units/mL)  Skilled Nurse (SN) may draw blood from IV access  Blood Draw Procedure:              - Aspirate at least 5 mL of blood              - Discard              - Obtain specimen              - Change injection cap              - Flush with 20 mL Normal Saline followed by a                 3-5 mL Heparin flush (10 units/mL)  Central :              - Sterile dressing changes are done weekly and as needed.              - Use chlor-hexadine scrub to cleanse site, apply Biopatch to insertion site,                 apply securement device dressing              - Injection caps are changed weekly and after EVERY lab draw.              - If sterile gauze is under dressing to control oozing,                 dressing change must be performed every 24 hours until gauze is not needed.    WOUND CARE ORDERS  For E surgical wound: Wet to dry dressing changes twice daily    For feet:  dressing changes for both feet q2 days: cleanse left foot blister with saline and paint with betadine; cleanse right foot wound and eschar with saline, apply hydrofera blue ready, wrap with cast padding and coban.       Medications: Review discharge medications with patient and family and provide education.      Discharge Medication List as of 4/11/2023  3:46 PM        START taking these medications    Details   oxyCODONE (ROXICODONE) 5 MG immediate release tablet Take 1 tablet (5 mg total) by mouth every 6 (six) hours as needed for Pain., Starting Tue 4/11/2023, Normal           CONTINUE these medications which have CHANGED    Details   benzonatate (TESSALON) 100 MG capsule Take 1 capsule (100 mg total) by mouth 3 (three) times daily as needed for Cough., Starting Tue 4/11/2023, Until Fri 4/21/2023 at 2359, Normal           CONTINUE these medications which have NOT CHANGED    Details   amlodipine (NORVASC) 10 MG tablet Take 10 mg by mouth once daily., Historical Med      aspirin  "(ECOTRIN) 81 MG EC tablet Take 81 mg by mouth once daily., Historical Med      atorvastatin (LIPITOR) 40 MG tablet Take 1 tablet (40 mg total) by mouth every evening., Starting Mon 3/20/2023, Until Tue 3/19/2024, Normal      BLOOD SUGAR DIAGNOSTIC (TRUE METRIX GLUCOSE TEST STRIP MISC) by Misc.(Non-Drug; Combo Route) route., Historical Med      carvediloL (COREG) 12.5 MG tablet Take 12.5 mg by mouth 2 (two) times daily., Starting Sat 1/28/2023, Historical Med      cephALEXin (KEFLEX) 500 MG capsule Take 2 capsules (1,000 mg total) by mouth every 12 (twelve) hours., Starting Fri 3/24/2023, Normal      clopidogreL (PLAVIX) 75 mg tablet Take 1 tablet (75 mg total) by mouth once daily., Starting Mon 3/20/2023, Until Tue 3/19/2024, Normal      DULoxetine (CYMBALTA) 30 MG capsule Take 1 capsule (30 mg total) by mouth 2 (two) times daily., Starting Mon 3/20/2023, Until Tue 3/19/2024, Normal      furosemide (LASIX) 20 MG tablet Take 1 tablet (20 mg total) by mouth once daily., Starting Mon 3/20/2023, Until Tue 3/19/2024, Normal      insulin syringe-needle,dispos. 0.3 mL 30 gauge x 5/16" Syrg by Misc.(Non-Drug; Combo Route) route., Historical Med      losartan-hydrochlorothiazide 100-25 mg (HYZAAR) 100-25 mg per tablet Take 1 tablet by mouth once daily., Historical Med      ondansetron (ZOFRAN) 8 MG tablet Starting Mon 5/6/2019, Historical Med      pen needle, diabetic 32 gauge x 5/32" Ndle Inject 1 each into the skin 4 (four) times daily., Starting Mon 3/20/2023, Normal      insulin aspart protamine-insulin aspart (NOVOLOG MIX 70-30FLEXPEN U-100) 100 unit/mL (70-30) InPn pen Inject 12 Units into the skin 2 (two) times daily before meals., Starting Mon 3/20/2023, Until Tue 3/19/2024, No Print      metformin (GLUCOPHAGE) 1000 MG tablet Take 1,000 mg by mouth 2 (two) times daily with meals., Historical Med      pantoprazole (PROTONIX) 40 MG tablet Take 1 tablet (40 mg total) by mouth 2 (two) times daily., Starting Mon " 3/20/2023, Until Tue 3/19/2024, Normal           STOP taking these medications       apixaban (ELIQUIS) 5 mg Tab Comments:   Reason for Stopping:               I certify that this patient is confined to her home and needs intermittent skilled nursing care, physical therapy, and occupational therapy.

## 2023-04-14 ENCOUNTER — TELEPHONE (OUTPATIENT)
Dept: PODIATRY | Facility: CLINIC | Age: 83
End: 2023-04-14
Payer: MEDICARE

## 2023-04-14 ENCOUNTER — OFFICE VISIT (OUTPATIENT)
Dept: INFECTIOUS DISEASES | Facility: CLINIC | Age: 83
End: 2023-04-14
Payer: MEDICARE

## 2023-04-14 ENCOUNTER — TELEPHONE (OUTPATIENT)
Dept: ADMINISTRATIVE | Facility: CLINIC | Age: 83
End: 2023-04-14
Payer: MEDICARE

## 2023-04-14 ENCOUNTER — TELEPHONE (OUTPATIENT)
Dept: WOUND CARE | Facility: CLINIC | Age: 83
End: 2023-04-14
Payer: MEDICARE

## 2023-04-14 VITALS
TEMPERATURE: 98 F | SYSTOLIC BLOOD PRESSURE: 93 MMHG | HEART RATE: 91 BPM | DIASTOLIC BLOOD PRESSURE: 43 MMHG | BODY MASS INDEX: 25.35 KG/M2 | HEIGHT: 64 IN

## 2023-04-14 DIAGNOSIS — T81.49XA SURGICAL WOUND INFECTION: ICD-10-CM

## 2023-04-14 DIAGNOSIS — T82.7XXD VASCULAR GRAFT INFECTION, SUBSEQUENT ENCOUNTER: Primary | ICD-10-CM

## 2023-04-14 DIAGNOSIS — L08.9 DIABETIC FOOT INFECTION: ICD-10-CM

## 2023-04-14 DIAGNOSIS — E11.628 DIABETIC FOOT INFECTION: ICD-10-CM

## 2023-04-14 PROCEDURE — 99999 PR PBB SHADOW E&M-EST. PATIENT-LVL V: ICD-10-PCS | Mod: PBBFAC,,, | Performed by: INTERNAL MEDICINE

## 2023-04-14 PROCEDURE — 1125F PR PAIN SEVERITY QUANTIFIED, PAIN PRESENT: ICD-10-PCS | Mod: CPTII,S$GLB,, | Performed by: INTERNAL MEDICINE

## 2023-04-14 PROCEDURE — 1159F PR MEDICATION LIST DOCUMENTED IN MEDICAL RECORD: ICD-10-PCS | Mod: CPTII,S$GLB,, | Performed by: INTERNAL MEDICINE

## 2023-04-14 PROCEDURE — 1160F RVW MEDS BY RX/DR IN RCRD: CPT | Mod: CPTII,S$GLB,, | Performed by: INTERNAL MEDICINE

## 2023-04-14 PROCEDURE — 3288F PR FALLS RISK ASSESSMENT DOCUMENTED: ICD-10-PCS | Mod: CPTII,S$GLB,, | Performed by: INTERNAL MEDICINE

## 2023-04-14 PROCEDURE — 99999 PR PBB SHADOW E&M-EST. PATIENT-LVL V: CPT | Mod: PBBFAC,,, | Performed by: INTERNAL MEDICINE

## 2023-04-14 PROCEDURE — 99214 OFFICE O/P EST MOD 30 MIN: CPT | Mod: S$GLB,,, | Performed by: INTERNAL MEDICINE

## 2023-04-14 PROCEDURE — 3288F FALL RISK ASSESSMENT DOCD: CPT | Mod: CPTII,S$GLB,, | Performed by: INTERNAL MEDICINE

## 2023-04-14 PROCEDURE — 3074F PR MOST RECENT SYSTOLIC BLOOD PRESSURE < 130 MM HG: ICD-10-PCS | Mod: CPTII,S$GLB,, | Performed by: INTERNAL MEDICINE

## 2023-04-14 PROCEDURE — 3074F SYST BP LT 130 MM HG: CPT | Mod: CPTII,S$GLB,, | Performed by: INTERNAL MEDICINE

## 2023-04-14 PROCEDURE — 1111F PR DISCHARGE MEDS RECONCILED W/ CURRENT OUTPATIENT MED LIST: ICD-10-PCS | Mod: CPTII,S$GLB,, | Performed by: INTERNAL MEDICINE

## 2023-04-14 PROCEDURE — 1101F PT FALLS ASSESS-DOCD LE1/YR: CPT | Mod: CPTII,S$GLB,, | Performed by: INTERNAL MEDICINE

## 2023-04-14 PROCEDURE — 3078F PR MOST RECENT DIASTOLIC BLOOD PRESSURE < 80 MM HG: ICD-10-PCS | Mod: CPTII,S$GLB,, | Performed by: INTERNAL MEDICINE

## 2023-04-14 PROCEDURE — 3078F DIAST BP <80 MM HG: CPT | Mod: CPTII,S$GLB,, | Performed by: INTERNAL MEDICINE

## 2023-04-14 PROCEDURE — 99214 PR OFFICE/OUTPT VISIT, EST, LEVL IV, 30-39 MIN: ICD-10-PCS | Mod: S$GLB,,, | Performed by: INTERNAL MEDICINE

## 2023-04-14 PROCEDURE — 1160F PR REVIEW ALL MEDS BY PRESCRIBER/CLIN PHARMACIST DOCUMENTED: ICD-10-PCS | Mod: CPTII,S$GLB,, | Performed by: INTERNAL MEDICINE

## 2023-04-14 PROCEDURE — 1101F PR PT FALLS ASSESS DOC 0-1 FALLS W/OUT INJ PAST YR: ICD-10-PCS | Mod: CPTII,S$GLB,, | Performed by: INTERNAL MEDICINE

## 2023-04-14 PROCEDURE — 1159F MED LIST DOCD IN RCRD: CPT | Mod: CPTII,S$GLB,, | Performed by: INTERNAL MEDICINE

## 2023-04-14 PROCEDURE — 1111F DSCHRG MED/CURRENT MED MERGE: CPT | Mod: CPTII,S$GLB,, | Performed by: INTERNAL MEDICINE

## 2023-04-14 PROCEDURE — 1125F AMNT PAIN NOTED PAIN PRSNT: CPT | Mod: CPTII,S$GLB,, | Performed by: INTERNAL MEDICINE

## 2023-04-14 NOTE — TELEPHONE ENCOUNTER
----- Message from Zeina Jimenez MA sent at 4/14/2023  2:45 PM CDT -----  Hello! This pt has referrals in for each of your departments. Dr. Hopper would like the pt seen as soon as possible due to the fact that none of her hospital follow ups were scheduled. Any help in getting her scheduled would be greatly appreciated. Thank you so much.

## 2023-04-14 NOTE — PROGRESS NOTES
Subjective:      Patient ID: Nelsy Marino is a 82 y.o. female.    Chief Complaint:Follow-up      History of Present Illness  82 year old female with a history of HTN, DMII, DVT, CHF, PAD, DVT and a history of prior fem-fem 20 years prior that occluded s/p R iliofemoral bypass.  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  She was admitted for worsening right foot pain, swelling and discoloration following removal of an ingrown toe nail.  Patient was diagnosed with dry gangrene of the foot.  She underwent Left external iliac to right SFA bypass with 8mm PTFE on March 2.  She then underwent transmetatarsal amputation of the right foot on March 4.      She was on meropenem.  She was ultimately discharged from the hospital on March 20 on ciprofloxacin and cephalexin.  She was re-admitted on March 29 with dehiscence of left groin wound.  On march 31, she underwent debridement of left groin surgical wound.  Cultures were positive for Candida glabrata, Candida albicans and Prevotella.  She was now discharged on IV micafungin and IV cefepime.    Review of Systems   Constitutional: Positive for malaise/fatigue and weight loss. Negative for chills, decreased appetite, fever, night sweats and weight gain.   HENT:  Negative for congestion, ear pain, hearing loss, hoarse voice, sore throat and tinnitus.    Eyes:  Negative for blurred vision, redness and visual disturbance.   Cardiovascular:  Negative for chest pain, leg swelling and palpitations.   Respiratory:  Positive for cough. Negative for hemoptysis, shortness of breath, sputum production and wheezing.    Hematologic/Lymphatic: Negative for adenopathy. Does not bruise/bleed easily.   Skin:  Negative for dry skin, itching, rash and suspicious lesions.   Musculoskeletal:  Positive for myalgias. Negative for back pain, joint pain and neck pain.    Gastrointestinal:  Positive for abdominal pain. Negative for constipation, diarrhea, heartburn, nausea and vomiting.   Genitourinary:  Negative for dysuria, flank pain, frequency, hematuria, hesitancy and urgency.   Neurological:  Negative for dizziness, headaches, numbness, paresthesias and weakness.   Psychiatric/Behavioral:  Positive for memory loss. Negative for depression. The patient does not have insomnia and is not nervous/anxious.    Objective:   Physical Exam  Vitals and nursing note reviewed.   Constitutional:       Appearance: Normal appearance. She is not ill-appearing or diaphoretic.   Musculoskeletal:      Comments: Staples in right groin.      Open wound to left lower abdomen with drain in place.  Left groin wound healing.   Skin:     Comments: Staples in right groin.      Open wound to left lower abdomen with drain in place.  Left groin wound healing.   Neurological:      Mental Status: She is alert.               Assessment:     1. Vascular graft infection, subsequent encounter :  continue IV cefepime and IV micafungin.   2. Diabetic foot infection/gangrene:  s/p amputation.  Antimicrobials as above.  Referral to podiatry.   3. Surgical wound infection :  Referral to wound care clinic.       Plan:      Vascular graft infection, subsequent encounter  -     Ambulatory referral/consult to Wound Clinic; Future; Expected date: 04/21/2023    Diabetic foot infection  -     Ambulatory referral/consult to Podiatry; Future; Expected date: 04/21/2023    Surgical wound infection  -     Ambulatory referral/consult to Podiatry; Future; Expected date: 04/21/2023  -     Ambulatory referral/consult to Wound Clinic; Future; Expected date: 04/21/2023

## 2023-04-14 NOTE — TELEPHONE ENCOUNTER
Per review of chart the patient had a transmetatarsal amputation on 03/04/23 with Dr. Godfrey.   She has an appt scheduled for 04/24/23 for post-op with Dr. Godfrey.    Jenna

## 2023-04-14 NOTE — TELEPHONE ENCOUNTER
----- Message from Cherie Aldridge MA sent at 4/14/2023  4:52 PM CDT -----  Hello! This pt has referrals in for each of your departments. Dr. Hopper would like the pt seen as soon as possible due to the fact that none of her hospital follow ups were scheduled. Any help in getting her scheduled would be greatly appreciated. Thank you so much.       ----- Message -----  From: Gamaliel Gan LPN  Sent: 4/14/2023   4:51 PM CDT  To: Zeina Jimenez MA    Hi! This has to go to podiatry or [facility], the provider in wound care only see from hip to ankle. Nurse Alston  ----- Message -----  From: Zeina Jimenez MA  Sent: 4/14/2023   2:47 PM CDT  To: [facility] Podiatry Clinical Support Staff, #    Hello! This pt has referrals in for each of your departments. Dr. Hopper would like the pt seen as soon as possible due to the fact that none of her hospital follow ups were scheduled. Any help in getting her scheduled would be greatly appreciated. Thank you so much.

## 2023-04-14 NOTE — PROGRESS NOTES
"Phoned patient in response to reply of "3" to post-discharge texting tracker. No answer. Mailbox full and could not leave message.  Made second attempt to call patient. No answer. Mailbox is full and could not leave a message.  "

## 2023-04-14 NOTE — TELEPHONE ENCOUNTER
Forward the message back to Zeina Jimenez letting her know that the provider only see patient with wounds from hip to ankle. To try [facility].

## 2023-04-17 ENCOUNTER — PES CALL (OUTPATIENT)
Dept: ADMINISTRATIVE | Facility: CLINIC | Age: 83
End: 2023-04-17
Payer: MEDICARE

## 2023-04-18 ENCOUNTER — PES CALL (OUTPATIENT)
Dept: ADMINISTRATIVE | Facility: CLINIC | Age: 83
End: 2023-04-18
Payer: MEDICARE

## 2023-04-20 ENCOUNTER — HOSPITAL ENCOUNTER (INPATIENT)
Facility: HOSPITAL | Age: 83
LOS: 13 days | Discharge: HOME HEALTH | DRG: 682 | End: 2023-05-05
Attending: EMERGENCY MEDICINE | Admitting: EMERGENCY MEDICINE
Payer: MEDICARE

## 2023-04-20 ENCOUNTER — NURSE TRIAGE (OUTPATIENT)
Dept: ADMINISTRATIVE | Facility: CLINIC | Age: 83
End: 2023-04-20
Payer: MEDICARE

## 2023-04-20 DIAGNOSIS — I77.6 VASCULAR INFLAMMATION OR INFECTION: ICD-10-CM

## 2023-04-20 DIAGNOSIS — K92.1 MELENA: ICD-10-CM

## 2023-04-20 DIAGNOSIS — R09.89 ABSENT PEDAL PULSES: ICD-10-CM

## 2023-04-20 DIAGNOSIS — R09.89 DIMINISHED PULSES IN LOWER EXTREMITY: ICD-10-CM

## 2023-04-20 DIAGNOSIS — I70.229 CRITICAL LOWER LIMB ISCHEMIA: ICD-10-CM

## 2023-04-20 DIAGNOSIS — T82.7XXD VASCULAR GRAFT INFECTION, SUBSEQUENT ENCOUNTER: ICD-10-CM

## 2023-04-20 DIAGNOSIS — I73.9 PAD (PERIPHERAL ARTERY DISEASE): ICD-10-CM

## 2023-04-20 DIAGNOSIS — L08.9 WOUND INFECTION: ICD-10-CM

## 2023-04-20 DIAGNOSIS — R07.9 CHEST PAIN: ICD-10-CM

## 2023-04-20 DIAGNOSIS — K92.2 UPPER GI BLEED: ICD-10-CM

## 2023-04-20 DIAGNOSIS — D63.1 ANEMIA IN CHRONIC KIDNEY DISEASE, UNSPECIFIED CKD STAGE: ICD-10-CM

## 2023-04-20 DIAGNOSIS — T81.30XA WOUND DEHISCENCE: ICD-10-CM

## 2023-04-20 DIAGNOSIS — N17.9 AKI (ACUTE KIDNEY INJURY): Primary | ICD-10-CM

## 2023-04-20 DIAGNOSIS — R53.81 DEBILITY: ICD-10-CM

## 2023-04-20 DIAGNOSIS — J96.01 ACUTE HYPOXEMIC RESPIRATORY FAILURE: ICD-10-CM

## 2023-04-20 DIAGNOSIS — T14.8XXA WOUND INFECTION: ICD-10-CM

## 2023-04-20 DIAGNOSIS — N18.9 ANEMIA IN CHRONIC KIDNEY DISEASE, UNSPECIFIED CKD STAGE: ICD-10-CM

## 2023-04-20 DIAGNOSIS — R40.0 SOMNOLENCE: ICD-10-CM

## 2023-04-20 LAB
ALBUMIN SERPL BCP-MCNC: 2.1 G/DL (ref 3.5–5.2)
ALP SERPL-CCNC: 75 U/L (ref 55–135)
ALT SERPL W/O P-5'-P-CCNC: 6 U/L (ref 10–44)
ANION GAP SERPL CALC-SCNC: 13 MMOL/L (ref 8–16)
AST SERPL-CCNC: 14 U/L (ref 10–40)
BASOPHILS # BLD AUTO: 0.03 K/UL (ref 0–0.2)
BASOPHILS NFR BLD: 0.2 % (ref 0–1.9)
BILIRUB SERPL-MCNC: 0.3 MG/DL (ref 0.1–1)
BUN SERPL-MCNC: 59 MG/DL (ref 8–23)
CALCIUM SERPL-MCNC: 9.8 MG/DL (ref 8.7–10.5)
CHLORIDE SERPL-SCNC: 105 MMOL/L (ref 95–110)
CO2 SERPL-SCNC: 19 MMOL/L (ref 23–29)
CREAT SERPL-MCNC: 2.1 MG/DL (ref 0.5–1.4)
DIFFERENTIAL METHOD: ABNORMAL
EOSINOPHIL # BLD AUTO: 1.1 K/UL (ref 0–0.5)
EOSINOPHIL NFR BLD: 8.2 % (ref 0–8)
ERYTHROCYTE [DISTWIDTH] IN BLOOD BY AUTOMATED COUNT: 15.1 % (ref 11.5–14.5)
EST. GFR  (NO RACE VARIABLE): 23.1 ML/MIN/1.73 M^2
GLUCOSE SERPL-MCNC: 158 MG/DL (ref 70–110)
HCT VFR BLD AUTO: 24.1 % (ref 37–48.5)
HGB BLD-MCNC: 7.5 G/DL (ref 12–16)
IMM GRANULOCYTES # BLD AUTO: 0.07 K/UL (ref 0–0.04)
IMM GRANULOCYTES NFR BLD AUTO: 0.5 % (ref 0–0.5)
LYMPHOCYTES # BLD AUTO: 2 K/UL (ref 1–4.8)
LYMPHOCYTES NFR BLD: 15.4 % (ref 18–48)
MCH RBC QN AUTO: 26 PG (ref 27–31)
MCHC RBC AUTO-ENTMCNC: 31.1 G/DL (ref 32–36)
MCV RBC AUTO: 84 FL (ref 82–98)
MONOCYTES # BLD AUTO: 0.8 K/UL (ref 0.3–1)
MONOCYTES NFR BLD: 5.8 % (ref 4–15)
NEUTROPHILS # BLD AUTO: 9.2 K/UL (ref 1.8–7.7)
NEUTROPHILS NFR BLD: 69.9 % (ref 38–73)
NRBC BLD-RTO: 0 /100 WBC
PLATELET # BLD AUTO: 576 K/UL (ref 150–450)
PMV BLD AUTO: 10.1 FL (ref 9.2–12.9)
POTASSIUM SERPL-SCNC: 3.7 MMOL/L (ref 3.5–5.1)
PROT SERPL-MCNC: 7.9 G/DL (ref 6–8.4)
RBC # BLD AUTO: 2.88 M/UL (ref 4–5.4)
SODIUM SERPL-SCNC: 137 MMOL/L (ref 136–145)
WBC # BLD AUTO: 13.22 K/UL (ref 3.9–12.7)

## 2023-04-20 PROCEDURE — 99285 EMERGENCY DEPT VISIT HI MDM: CPT | Mod: 25

## 2023-04-20 PROCEDURE — 87040 BLOOD CULTURE FOR BACTERIA: CPT

## 2023-04-20 PROCEDURE — 93010 ELECTROCARDIOGRAM REPORT: CPT | Mod: ,,, | Performed by: INTERNAL MEDICINE

## 2023-04-20 PROCEDURE — 63600175 PHARM REV CODE 636 W HCPCS: Performed by: EMERGENCY MEDICINE

## 2023-04-20 PROCEDURE — 96365 THER/PROPH/DIAG IV INF INIT: CPT

## 2023-04-20 PROCEDURE — 99285 EMERGENCY DEPT VISIT HI MDM: CPT | Mod: GC,,, | Performed by: EMERGENCY MEDICINE

## 2023-04-20 PROCEDURE — 85025 COMPLETE CBC W/AUTO DIFF WBC: CPT

## 2023-04-20 PROCEDURE — 25000003 PHARM REV CODE 250

## 2023-04-20 PROCEDURE — 83605 ASSAY OF LACTIC ACID: CPT

## 2023-04-20 PROCEDURE — 93010 EKG 12-LEAD: ICD-10-PCS | Mod: ,,, | Performed by: INTERNAL MEDICINE

## 2023-04-20 PROCEDURE — 63600175 PHARM REV CODE 636 W HCPCS

## 2023-04-20 PROCEDURE — 99285 PR EMERGENCY DEPT VISIT,LEVEL V: ICD-10-PCS | Mod: GC,,, | Performed by: EMERGENCY MEDICINE

## 2023-04-20 PROCEDURE — 93005 ELECTROCARDIOGRAM TRACING: CPT

## 2023-04-20 PROCEDURE — 80053 COMPREHEN METABOLIC PANEL: CPT

## 2023-04-20 PROCEDURE — 96361 HYDRATE IV INFUSION ADD-ON: CPT

## 2023-04-20 PROCEDURE — 99900035 HC TECH TIME PER 15 MIN (STAT)

## 2023-04-20 PROCEDURE — 82803 BLOOD GASES ANY COMBINATION: CPT

## 2023-04-20 RX ADMIN — CEFEPIME 2 G: 2 INJECTION, POWDER, FOR SOLUTION INTRAVENOUS at 09:04

## 2023-04-20 RX ADMIN — SODIUM CHLORIDE, SODIUM LACTATE, POTASSIUM CHLORIDE, AND CALCIUM CHLORIDE 1000 ML: .6; .31; .03; .02 INJECTION, SOLUTION INTRAVENOUS at 09:04

## 2023-04-20 NOTE — Clinical Note
Diagnosis: LOLY (acute kidney injury) [005141]   Future Attending Provider: CARLOS MANUEL ORELLANA [[id number]]   Admitting Provider:: ALFREDO CRAVEN [[id number]]

## 2023-04-20 NOTE — TELEPHONE ENCOUNTER
Spoke with caregiver of pt who reports that pt is noted to have black color to toes, and foot of left extremity, and not to have pain to entire leg. Advised need to be evaluated in ED now. Informed if unable to transport pt to ED can call 911. Verbalized understanding    Reason for Disposition   Entire foot is cool or blue in comparison to other foot    Protocols used: Foot Pain-A-AH

## 2023-04-21 PROBLEM — N17.9 AKI (ACUTE KIDNEY INJURY): Status: ACTIVE | Noted: 2023-04-21

## 2023-04-21 PROBLEM — R53.81 DEBILITY: Status: ACTIVE | Noted: 2023-04-21

## 2023-04-21 PROBLEM — L97.411 SKIN ULCER OF RIGHT HEEL, LIMITED TO BREAKDOWN OF SKIN: Status: ACTIVE | Noted: 2023-04-21

## 2023-04-21 LAB
ALBUMIN SERPL BCP-MCNC: 2.1 G/DL (ref 3.5–5.2)
ALP SERPL-CCNC: 80 U/L (ref 55–135)
ALT SERPL W/O P-5'-P-CCNC: 6 U/L (ref 10–44)
ANION GAP SERPL CALC-SCNC: 12 MMOL/L (ref 8–16)
AST SERPL-CCNC: 17 U/L (ref 10–40)
BASOPHILS # BLD AUTO: 0.03 K/UL (ref 0–0.2)
BASOPHILS NFR BLD: 0.3 % (ref 0–1.9)
BILIRUB SERPL-MCNC: 0.4 MG/DL (ref 0.1–1)
BUN SERPL-MCNC: 56 MG/DL (ref 8–23)
CALCIUM SERPL-MCNC: 9.7 MG/DL (ref 8.7–10.5)
CHLORIDE SERPL-SCNC: 104 MMOL/L (ref 95–110)
CO2 SERPL-SCNC: 19 MMOL/L (ref 23–29)
CREAT SERPL-MCNC: 1.8 MG/DL (ref 0.5–1.4)
DIFFERENTIAL METHOD: ABNORMAL
EOSINOPHIL # BLD AUTO: 0.7 K/UL (ref 0–0.5)
EOSINOPHIL NFR BLD: 6.5 % (ref 0–8)
ERYTHROCYTE [DISTWIDTH] IN BLOOD BY AUTOMATED COUNT: 15 % (ref 11.5–14.5)
EST. GFR  (NO RACE VARIABLE): 27.8 ML/MIN/1.73 M^2
GLUCOSE SERPL-MCNC: 172 MG/DL (ref 70–110)
HCT VFR BLD AUTO: 24.7 % (ref 37–48.5)
HGB BLD-MCNC: 7.4 G/DL (ref 12–16)
IMM GRANULOCYTES # BLD AUTO: 0.05 K/UL (ref 0–0.04)
IMM GRANULOCYTES NFR BLD AUTO: 0.4 % (ref 0–0.5)
LYMPHOCYTES # BLD AUTO: 1.3 K/UL (ref 1–4.8)
LYMPHOCYTES NFR BLD: 11.5 % (ref 18–48)
MAGNESIUM SERPL-MCNC: 1.9 MG/DL (ref 1.6–2.6)
MCH RBC QN AUTO: 25.3 PG (ref 27–31)
MCHC RBC AUTO-ENTMCNC: 30 G/DL (ref 32–36)
MCV RBC AUTO: 85 FL (ref 82–98)
MONOCYTES # BLD AUTO: 0.6 K/UL (ref 0.3–1)
MONOCYTES NFR BLD: 5.3 % (ref 4–15)
NEUTROPHILS # BLD AUTO: 8.7 K/UL (ref 1.8–7.7)
NEUTROPHILS NFR BLD: 76 % (ref 38–73)
NRBC BLD-RTO: 0 /100 WBC
PHOSPHATE SERPL-MCNC: 4.3 MG/DL (ref 2.7–4.5)
PLATELET # BLD AUTO: 526 K/UL (ref 150–450)
PMV BLD AUTO: 10 FL (ref 9.2–12.9)
POCT GLUCOSE: 165 MG/DL (ref 70–110)
POCT GLUCOSE: 167 MG/DL (ref 70–110)
POCT GLUCOSE: 168 MG/DL (ref 70–110)
POCT GLUCOSE: 182 MG/DL (ref 70–110)
POTASSIUM SERPL-SCNC: 3.3 MMOL/L (ref 3.5–5.1)
PROT SERPL-MCNC: 7.4 G/DL (ref 6–8.4)
RBC # BLD AUTO: 2.92 M/UL (ref 4–5.4)
SODIUM SERPL-SCNC: 135 MMOL/L (ref 136–145)
VANCOMYCIN SERPL-MCNC: <1.4 UG/ML
WBC # BLD AUTO: 11.42 K/UL (ref 3.9–12.7)

## 2023-04-21 PROCEDURE — 99223 PR INITIAL HOSPITAL CARE,LEVL III: ICD-10-PCS | Mod: ,,, | Performed by: INTERNAL MEDICINE

## 2023-04-21 PROCEDURE — 80202 ASSAY OF VANCOMYCIN: CPT | Performed by: INTERNAL MEDICINE

## 2023-04-21 PROCEDURE — 36415 COLL VENOUS BLD VENIPUNCTURE: CPT

## 2023-04-21 PROCEDURE — 63600175 PHARM REV CODE 636 W HCPCS: Performed by: INTERNAL MEDICINE

## 2023-04-21 PROCEDURE — 93922 UPR/L XTREMITY ART 2 LEVELS: CPT | Performed by: SURGERY

## 2023-04-21 PROCEDURE — 83735 ASSAY OF MAGNESIUM: CPT

## 2023-04-21 PROCEDURE — 96372 THER/PROPH/DIAG INJ SC/IM: CPT

## 2023-04-21 PROCEDURE — G0378 HOSPITAL OBSERVATION PER HR: HCPCS

## 2023-04-21 PROCEDURE — 25000003 PHARM REV CODE 250

## 2023-04-21 PROCEDURE — 25000003 PHARM REV CODE 250: Performed by: INTERNAL MEDICINE

## 2023-04-21 PROCEDURE — 25000003 PHARM REV CODE 250: Performed by: STUDENT IN AN ORGANIZED HEALTH CARE EDUCATION/TRAINING PROGRAM

## 2023-04-21 PROCEDURE — 99024 PR POST-OP FOLLOW-UP VISIT: ICD-10-PCS | Mod: ,,, | Performed by: PODIATRIST

## 2023-04-21 PROCEDURE — 99223 1ST HOSP IP/OBS HIGH 75: CPT | Mod: ,,, | Performed by: INTERNAL MEDICINE

## 2023-04-21 PROCEDURE — 80053 COMPREHEN METABOLIC PANEL: CPT

## 2023-04-21 PROCEDURE — 85025 COMPLETE CBC W/AUTO DIFF WBC: CPT

## 2023-04-21 PROCEDURE — 84100 ASSAY OF PHOSPHORUS: CPT

## 2023-04-21 PROCEDURE — 63600175 PHARM REV CODE 636 W HCPCS: Performed by: STUDENT IN AN ORGANIZED HEALTH CARE EDUCATION/TRAINING PROGRAM

## 2023-04-21 PROCEDURE — 63600175 PHARM REV CODE 636 W HCPCS

## 2023-04-21 PROCEDURE — 99024 POSTOP FOLLOW-UP VISIT: CPT | Mod: ,,, | Performed by: PODIATRIST

## 2023-04-21 RX ORDER — GLUCAGON 1 MG
1 KIT INJECTION
Status: DISCONTINUED | OUTPATIENT
Start: 2023-04-21 | End: 2023-05-05 | Stop reason: HOSPADM

## 2023-04-21 RX ORDER — POTASSIUM CHLORIDE 20 MEQ/1
40 TABLET, EXTENDED RELEASE ORAL ONCE
Status: COMPLETED | OUTPATIENT
Start: 2023-04-21 | End: 2023-04-21

## 2023-04-21 RX ORDER — DEXTROSE 40 %
30 GEL (GRAM) ORAL
Status: DISCONTINUED | OUTPATIENT
Start: 2023-04-21 | End: 2023-05-05 | Stop reason: HOSPADM

## 2023-04-21 RX ORDER — CLOPIDOGREL BISULFATE 75 MG/1
75 TABLET ORAL DAILY
Status: DISCONTINUED | OUTPATIENT
Start: 2023-04-21 | End: 2023-04-26

## 2023-04-21 RX ORDER — OXYCODONE HYDROCHLORIDE 10 MG/1
10 TABLET ORAL EVERY 4 HOURS PRN
Status: DISCONTINUED | OUTPATIENT
Start: 2023-04-21 | End: 2023-04-26

## 2023-04-21 RX ORDER — HEPARIN SODIUM 5000 [USP'U]/ML
5000 INJECTION, SOLUTION INTRAVENOUS; SUBCUTANEOUS EVERY 8 HOURS
Status: DISCONTINUED | OUTPATIENT
Start: 2023-04-21 | End: 2023-04-25

## 2023-04-21 RX ORDER — SODIUM CHLORIDE, SODIUM LACTATE, POTASSIUM CHLORIDE, CALCIUM CHLORIDE 600; 310; 30; 20 MG/100ML; MG/100ML; MG/100ML; MG/100ML
INJECTION, SOLUTION INTRAVENOUS CONTINUOUS
Status: ACTIVE | OUTPATIENT
Start: 2023-04-21 | End: 2023-04-22

## 2023-04-21 RX ORDER — DEXTROSE 40 %
15 GEL (GRAM) ORAL
Status: DISCONTINUED | OUTPATIENT
Start: 2023-04-21 | End: 2023-05-05 | Stop reason: HOSPADM

## 2023-04-21 RX ORDER — INSULIN ASPART 100 [IU]/ML
0-5 INJECTION, SOLUTION INTRAVENOUS; SUBCUTANEOUS
Status: DISCONTINUED | OUTPATIENT
Start: 2023-04-21 | End: 2023-05-05 | Stop reason: HOSPADM

## 2023-04-21 RX ORDER — NALOXONE HCL 0.4 MG/ML
0.02 VIAL (ML) INJECTION
Status: DISCONTINUED | OUTPATIENT
Start: 2023-04-21 | End: 2023-05-05 | Stop reason: HOSPADM

## 2023-04-21 RX ORDER — HYDROMORPHONE HYDROCHLORIDE 1 MG/ML
0.5 INJECTION, SOLUTION INTRAMUSCULAR; INTRAVENOUS; SUBCUTANEOUS EVERY 6 HOURS PRN
Status: DISCONTINUED | OUTPATIENT
Start: 2023-04-21 | End: 2023-04-26

## 2023-04-21 RX ORDER — PANTOPRAZOLE SODIUM 40 MG/1
40 TABLET, DELAYED RELEASE ORAL 2 TIMES DAILY
Status: DISCONTINUED | OUTPATIENT
Start: 2023-04-21 | End: 2023-04-25

## 2023-04-21 RX ORDER — HYDROCODONE BITARTRATE AND ACETAMINOPHEN 5; 325 MG/1; MG/1
1 TABLET ORAL EVERY 6 HOURS PRN
Status: DISCONTINUED | OUTPATIENT
Start: 2023-04-21 | End: 2023-04-21

## 2023-04-21 RX ORDER — CARVEDILOL 12.5 MG/1
12.5 TABLET ORAL 2 TIMES DAILY
Status: DISCONTINUED | OUTPATIENT
Start: 2023-04-21 | End: 2023-04-26

## 2023-04-21 RX ORDER — DULOXETIN HYDROCHLORIDE 30 MG/1
30 CAPSULE, DELAYED RELEASE ORAL 2 TIMES DAILY
Status: DISCONTINUED | OUTPATIENT
Start: 2023-04-21 | End: 2023-05-05 | Stop reason: HOSPADM

## 2023-04-21 RX ORDER — ATORVASTATIN CALCIUM 40 MG/1
40 TABLET, FILM COATED ORAL NIGHTLY
Status: DISCONTINUED | OUTPATIENT
Start: 2023-04-21 | End: 2023-05-05 | Stop reason: HOSPADM

## 2023-04-21 RX ORDER — AMLODIPINE BESYLATE 10 MG/1
10 TABLET ORAL DAILY
Status: DISCONTINUED | OUTPATIENT
Start: 2023-04-21 | End: 2023-04-26

## 2023-04-21 RX ORDER — OXYCODONE HYDROCHLORIDE 5 MG/1
5 TABLET ORAL EVERY 4 HOURS PRN
Status: DISCONTINUED | OUTPATIENT
Start: 2023-04-21 | End: 2023-04-26

## 2023-04-21 RX ORDER — SODIUM CHLORIDE 0.9 % (FLUSH) 0.9 %
10 SYRINGE (ML) INJECTION EVERY 12 HOURS PRN
Status: DISCONTINUED | OUTPATIENT
Start: 2023-04-21 | End: 2023-05-05 | Stop reason: HOSPADM

## 2023-04-21 RX ORDER — ASPIRIN 81 MG/1
81 TABLET ORAL DAILY
Status: DISCONTINUED | OUTPATIENT
Start: 2023-04-21 | End: 2023-04-25

## 2023-04-21 RX ADMIN — CEFEPIME 2 G: 2 INJECTION, POWDER, FOR SOLUTION INTRAVENOUS at 03:04

## 2023-04-21 RX ADMIN — HEPARIN SODIUM 5000 UNITS: 5000 INJECTION INTRAVENOUS; SUBCUTANEOUS at 10:04

## 2023-04-21 RX ADMIN — SODIUM CHLORIDE, POTASSIUM CHLORIDE, SODIUM LACTATE AND CALCIUM CHLORIDE: 600; 310; 30; 20 INJECTION, SOLUTION INTRAVENOUS at 09:04

## 2023-04-21 RX ADMIN — PANTOPRAZOLE SODIUM 40 MG: 40 TABLET, DELAYED RELEASE ORAL at 09:04

## 2023-04-21 RX ADMIN — PANTOPRAZOLE SODIUM 40 MG: 40 TABLET, DELAYED RELEASE ORAL at 10:04

## 2023-04-21 RX ADMIN — DULOXETINE 30 MG: 30 CAPSULE, DELAYED RELEASE ORAL at 09:04

## 2023-04-21 RX ADMIN — POTASSIUM CHLORIDE 40 MEQ: 1500 TABLET, EXTENDED RELEASE ORAL at 09:04

## 2023-04-21 RX ADMIN — CARVEDILOL 12.5 MG: 12.5 TABLET, FILM COATED ORAL at 10:04

## 2023-04-21 RX ADMIN — MICAFUNGIN SODIUM 100 MG: 100 INJECTION, POWDER, LYOPHILIZED, FOR SOLUTION INTRAVENOUS at 04:04

## 2023-04-21 RX ADMIN — HYDROCODONE BITARTRATE AND ACETAMINOPHEN 1 TABLET: 5; 325 TABLET ORAL at 03:04

## 2023-04-21 RX ADMIN — OXYCODONE HYDROCHLORIDE 10 MG: 10 TABLET ORAL at 06:04

## 2023-04-21 RX ADMIN — SODIUM CHLORIDE, POTASSIUM CHLORIDE, SODIUM LACTATE AND CALCIUM CHLORIDE: 600; 310; 30; 20 INJECTION, SOLUTION INTRAVENOUS at 11:04

## 2023-04-21 RX ADMIN — ATORVASTATIN CALCIUM 40 MG: 40 TABLET, FILM COATED ORAL at 10:04

## 2023-04-21 RX ADMIN — CARVEDILOL 12.5 MG: 12.5 TABLET, FILM COATED ORAL at 09:04

## 2023-04-21 RX ADMIN — AMLODIPINE BESYLATE 10 MG: 10 TABLET ORAL at 09:04

## 2023-04-21 RX ADMIN — CLOPIDOGREL BISULFATE 75 MG: 75 TABLET ORAL at 09:04

## 2023-04-21 RX ADMIN — HEPARIN SODIUM 5000 UNITS: 5000 INJECTION INTRAVENOUS; SUBCUTANEOUS at 06:04

## 2023-04-21 RX ADMIN — ASPIRIN 81 MG: 81 TABLET, COATED ORAL at 09:04

## 2023-04-21 RX ADMIN — HEPARIN SODIUM 5000 UNITS: 5000 INJECTION INTRAVENOUS; SUBCUTANEOUS at 04:04

## 2023-04-21 RX ADMIN — HYDROCODONE BITARTRATE AND ACETAMINOPHEN 1 TABLET: 5; 325 TABLET ORAL at 09:04

## 2023-04-21 RX ADMIN — DULOXETINE 30 MG: 30 CAPSULE, DELAYED RELEASE ORAL at 10:04

## 2023-04-21 RX ADMIN — VANCOMYCIN HYDROCHLORIDE 1250 MG: 1.25 INJECTION, POWDER, LYOPHILIZED, FOR SOLUTION INTRAVENOUS at 06:04

## 2023-04-21 NOTE — HPI
This is an 82-year-old female with a past medical history of severe PAD, hypertension, type 2 diabetes, HFpEF (EF 65%), L femoral bypass and L leg flap, R toe ampuations 2 months prior with presenting after  noted waxing/waning confusion since her discharge from hospital two weeks ago. History largely provided by  at bedside as patient is intermittently somnolent. She was discharged on IV Cefepime and Micafungin for her R foot amputation and patient reports adherence with regimen at home. He grew concerned that int he past two weeks she had worsening LLE pain and darkened skin, which is what happened before her R toes were amputated. His niece contacted a nurse who recommneded ED evaluation. No fevers, chills, HA, chest pain, SOB, N/V/D, abdominal pain.    Pt receives  nurse aide, PT/OT since hospital DC and ID very recently made a referral to podiatry and wound care, but appointments not yet made.     In the ED patient was hemodynamically stable and hypertensive, intermittently on RA to 2L NC. Leukocytosis to 13, Hgb 7.5, Cr 2.1, Albumin 2.1, BCx in process, CT head and CXR wnl. Started on BS Abx with continued Cefepime, [place], started on Vanc pending Bcx, US LE arterial with diminished flow throughout the left lower extremity. Monophasic waveforms suggesting atherosclerotic disease. Admitted to  for LOLY and Podiatry/Wound care.

## 2023-04-21 NOTE — PLAN OF CARE
04/21/23 1555   Discharge Assessment   Assessment Type Discharge Planning Assessment   Confirmed/corrected address, phone number and insurance Yes   Confirmed Demographics Correct on Facesheet   Source of Information family   Communicated JUAN ALBERTO with patient/caregiver Yes   Reason For Admission PAD (peripheral artery disease)   People in Home spouse   Facility Arrived From: Home   Do you expect to return to your current living situation? Yes   Do you have help at home or someone to help you manage your care at home? Yes   Who are your caregiver(s) and their phone number(s)? Daughter Evelyn Marino [phone] and Dimas Marino, , [phone]   Prior to hospitilization cognitive status: Unable to Assess   Current cognitive status: Unable to Assess   Walking or Climbing Stairs ambulation difficulty, assistance 1 person;ambulation difficulty, requires equipment   Mobility Management RW   Dressing/Bathing bathing difficulty, assistance 1 person   Home Accessibility not wheelchair accessible   Home Layout Able to live on 1st floor   Equipment Currently Used at Home wheelchair;walker, rolling;bedside commode;hospital bed   Readmission within 30 days? Yes   Patient currently being followed by outpatient case management? No   Do you currently have service(s) that help you manage your care at home? Yes   Name and Contact number of agency [facility] home health   Is the pt/caregiver preference to resume services with current agency Yes   Do you take prescription medications? Yes   Do you have prescription coverage? Yes   Coverage [employer]   Do you have any problems affording any of your prescribed medications? No   Is the patient taking medications as prescribed? yes   Who is going to help you get home at discharge? Tereza Marino [phone] and Dimas Marino, , [phone]   Are you on dialysis? No   Do you take coumadin? No   Discharge Plan A Home Health  (with home infusion)    Discharge Plan B Home Health   DME Needed Upon Discharge  lift device   Discharge Plan discussed with: Adult children   Discharge Barriers Identified None   Physical Activity   On average, how many days per week do you engage in moderate to strenuous exercise (like a brisk walk)? 0 days   On average, how many minutes do you engage in exercise at this level? 0 min   Housing Stability   In the last 12 months, was there a time when you were not able to pay the mortgage or rent on time? N   In the last 12 months, how many places have you lived? 1   In the last 12 months, was there a time when you did not have a steady place to sleep or slept in a shelter (including now)? N   Transportation Needs   In the past 12 months, has lack of transportation kept you from medical appointments or from getting medications? no   In the past 12 months, has lack of transportation kept you from meetings, work, or from getting things needed for daily living? No   Food Insecurity   Within the past 12 months, you worried that your food would run out before you got the money to buy more. Never true   Within the past 12 months, the food you bought just didn't last and you didn't have money to get more. Never true   Social Connections   In a typical week, how many times do you talk on the phone with family, friends, or neighbors? More than 3   How often do you get together with friends or relatives? More than 3   Are you , , , , never , or living with a partner?    Alcohol Use   Q1: How often do you have a drink containing alcohol? Never   Q2: How many drinks containing alcohol do you have on a typical day when you are drinking? None   Q3: How often do you have six or more drinks on one occasion? Never   OTHER   Name(s) of People in Home Daughter Evelyn Marino [phone] and Dimas Marino, , [phone]     Pt lives in a one story home with  6 LUMA. Pt was dependent with most ADL's  prior to admission and uses a bath bench, a bedside commode, a rolling walker and a hospital bed. Family is asking for a lift device, SW sent MD a message. Pt is not on dialysis or Coumadin.     [facility] and [facility].     Pt has transportation home with family. SW will follow for all discharge planning needs.     GAVIOTA Tan, SY  [facility]  [id number]

## 2023-04-21 NOTE — SUBJECTIVE & OBJECTIVE
Past Medical History:   Diagnosis Date    CHF (congestive heart failure)     Diabetes mellitus     DVT (deep venous thrombosis)     Hypertension     Miscarriage        Past Surgical History:   Procedure Laterality Date     femoral vascular surgery       ANGIOGRAPHY OF LOWER EXTREMITY Right 2/23/2023    Procedure: Angiogram Extremity Unilateral;  Surgeon: Danny Dunbar MD;  Location: [facility];  Service: Vascular;  Laterality: Right;  RN PHONE PREOP 2/20/2023----CK CONSENT    ANGIOGRAPHY OF LOWER EXTREMITY Right 4/4/2023    Procedure: Angiogram Extremity Unilateral - possible endovascular intervention;  Surgeon: René Simms MD;  Location: [facility] OR 2ND FLR;  Service: Vascular;  Laterality: Right;  19.6 mins.  740.81 mGy  73.3399 Gy.cm  58ml dye  local - 1ml    CREATION OF FEMORAL-FEMORAL ARTERY BYPASS WITH GRAFT Bilateral 3/2/2023    Procedure: CREATION, BYPASS, ARTERIAL, FEMORAL TO FEMORAL, USING GRAFT, LEFT FEMORAL ENDARTERECTOMY, LEFT ILIAC ARTERY STENT PLACEMENT, RIGHT LOWER EXTREMITY ANGIOGRAM AND RIGHT SFA  ANGIOPLASTY;  Surgeon: Danny Dunbar MD;  [facility];  Service: Vascular;  Laterality: Bilateral;   744.47 MGY  175.36 Gycm  Flouro time 20.5 mins      CREATION OF ILIOFEMORAL ARTERY BYPASS Right 3/20/2019    Procedure: CREATION, BYPASS, ARTERIAL, ILIAC TO FEMORAL, RIGHT LOWER EXTREMITY, RIGHT PROFUNDAPLASTY;  Surgeon: Danny Dunbar MD;  Location: [facility];  Service: Vascular;  Laterality: Right;  11:00AM START PER ANNE  RN PREOP 3/13/2019  ACT'S, CELL SAVER, GRAFTS, BOOK WATER---NEED H/P  CONSENT IN AM------HGBA1C    CREATION OF MUSCLE ROTATIONAL FLAP Left 3/31/2023    Procedure: CREATION, FLAP, MUSCLE ROTATION;  Surgeon: Brandon Smiley MD;  Location: [facility] OR 2ND FLR;  Service: Plastics;  Laterality: Left;    ESOPHAGOGASTRODUODENOSCOPY N/A 3/17/2023    Procedure: EGD (ESOPHAGOGASTRODUODENOSCOPY);  Surgeon: Gee Rodriguez MD;  Location: [facility] ENDO (2ND FLR);   Service: Endoscopy;  Laterality: N/A;    ESOPHAGOGASTRODUODENOSCOPY N/A 4/11/2023    Procedure: EGD (ESOPHAGOGASTRODUODENOSCOPY);  Surgeon: Nazanin Kaiser MD;  Location: [facility] (2ND FLR);  Service: Endoscopy;  Laterality: N/A;    FOOT AMPUTATION THROUGH METATARSAL Right 3/4/2023    Procedure: AMPUTATION, FOOT, TRANSMETATARSAL;  Surgeon: Benjamin Godfrey DPM;  [facility];  Service: Podiatry;  Laterality: Right;    HYSTERECTOMY      ?unsure cervix present    INCISION AND DRAINAGE Right 4/12/2019    Procedure: Incision and Drainage - R groin washout, possible muscle flap;  Surgeon: Danny Dunbar MD;  [facility];  Service: Vascular;  Laterality: Right;  groin washout    INCISION AND DRAINAGE OF GROIN Right 5/3/2019    Procedure: Incision and Drainage R groin;  Surgeon: Danny Dunbar MD;  [facility];  Service: Vascular;  Laterality: Right;    WOUND DEBRIDEMENT Left 3/31/2023    Procedure: DEBRIDEMENT, WOUND;  Surgeon: René Simms MD;  [facility];  Service: Vascular;  Laterality: Left;       Review of patient's allergies indicates:   Allergen Reactions    Motrin [ibuprofen] Itching       No current facility-administered medications on file prior to encounter.     Current Outpatient Medications on File Prior to Encounter   Medication Sig    amlodipine (NORVASC) 10 MG tablet Take 10 mg by mouth once daily.    aspirin (ECOTRIN) 81 MG EC tablet Take 81 mg by mouth once daily.    atorvastatin (LIPITOR) 40 MG tablet Take 1 tablet (40 mg total) by mouth every evening.    benzonatate (TESSALON) 100 MG capsule Take 1 capsule (100 mg total) by mouth 3 (three) times daily as needed for Cough.    BLOOD SUGAR DIAGNOSTIC (TRUE METRIX GLUCOSE TEST STRIP MISC) by Misc.(Non-Drug; Combo Route) route.    carvediloL (COREG) 12.5 MG tablet Take 12.5 mg by mouth 2 (two) times daily.    cephALEXin (KEFLEX) 500 MG capsule Take 2 capsules (1,000 mg total) by  "mouth every 12 (twelve) hours.    clopidogreL (PLAVIX) 75 mg tablet Take 1 tablet (75 mg total) by mouth once daily.    DULoxetine (CYMBALTA) 30 MG capsule Take 1 capsule (30 mg total) by mouth 2 (two) times daily.    furosemide (LASIX) 20 MG tablet Take 1 tablet (20 mg total) by mouth once daily.    insulin aspart protamine-insulin aspart (NOVOLOG MIX 70-30FLEXPEN U-100) 100 unit/mL (70-30) InPn pen Inject 12 Units into the skin 2 (two) times daily before meals.    insulin syringe-needle,dispos. 0.3 mL 30 gauge x 5/16" Syrg by Misc.(Non-Drug; Combo Route) route.    losartan-hydrochlorothiazide 100-25 mg (HYZAAR) 100-25 mg per tablet Take 1 tablet by mouth once daily.    metformin (GLUCOPHAGE) 1000 MG tablet Take 1,000 mg by mouth 2 (two) times daily with meals.    ondansetron (ZOFRAN) 8 MG tablet     oxyCODONE (ROXICODONE) 5 MG immediate release tablet Take 1 tablet (5 mg total) by mouth every 6 (six) hours as needed for Pain.    pantoprazole (PROTONIX) 40 MG tablet Take 1 tablet (40 mg total) by mouth 2 (two) times daily.    pen needle, diabetic 32 gauge x 5/32" Ndle Inject 1 each into the skin 4 (four) times daily.     Family History       Problem Relation (Age of Onset)    Diabetes Father    Hypertension Father          Tobacco Use    Smoking status: Former     Types: Cigarettes     Quit date:      Years since quittin.3    Smokeless tobacco: Never   Substance and Sexual Activity    Alcohol use: No    Drug use: No    Sexual activity: Never     Review of Systems   Constitutional:  Negative for activity change, appetite change and fever.   HENT: Negative.     Eyes: Negative.    Respiratory:  Negative for cough, shortness of breath and wheezing.    Cardiovascular:  Negative for chest pain and leg swelling.   Gastrointestinal:  Negative for abdominal pain, constipation, diarrhea, nausea and vomiting.   Endocrine: Negative.    Genitourinary: Negative.    Musculoskeletal:  Positive for arthralgias and " myalgias.   Skin:  Positive for color change and wound. Negative for rash.   Allergic/Immunologic: Negative.    Neurological:  Negative for headaches.   Hematological: Negative.    Psychiatric/Behavioral:  Positive for confusion.    Objective:     Vital Signs (Most Recent):  Temp: 97.6 °F (36.4 °C) (04/21/23 0309)  Pulse: 92 (04/21/23 0313)  Resp: 18 (04/21/23 0341)  BP: (!) 158/73 (04/21/23 0309)  SpO2: 99 % (04/21/23 0309)   Vital Signs (24h Range):  Temp:  [97.6 °F (36.4 °C)-98.4 °F (36.9 °C)] 97.6 °F (36.4 °C)  Pulse:  [76-98] 92  Resp:  [17-20] 18  SpO2:  [98 %-100 %] 99 %  BP: (110-215)/() 158/73     Weight: 68 kg (150 lb)  Body mass index is 25.75 kg/m².    Physical Exam  Vitals and nursing note reviewed.   Constitutional:       Appearance: She is ill-appearing.   HENT:      Head: Normocephalic and atraumatic.      Mouth/Throat:      Mouth: Mucous membranes are dry.      Pharynx: Oropharynx is clear.   Eyes:      Extraocular Movements: Extraocular movements intact.      Conjunctiva/sclera: Conjunctivae normal.   Cardiovascular:      Rate and Rhythm: Regular rhythm. Tachycardia present.      Pulses: Normal pulses.   Pulmonary:      Effort: Pulmonary effort is normal. No respiratory distress.      Breath sounds: No wheezing or rales.   Abdominal:      Palpations: Abdomen is soft.      Tenderness: There is no abdominal tenderness. There is no guarding.   Musculoskeletal:      Cervical back: Normal range of motion and neck supple.      Right lower leg: No edema.      Left lower leg: No edema.   Skin:     General: Skin is warm.      Findings: Lesion (R heel, L dorsum with darkened skin) present.   Neurological:      General: No focal deficit present.      Mental Status: She is disoriented.      Comments: Oriented to person, place, situation, not time             Significant Labs: All pertinent labs within the past 24 hours have been reviewed.    Significant Imaging: I have reviewed all pertinent imaging  results/findings within the past 24 hours.

## 2023-04-21 NOTE — ASSESSMENT & PLAN NOTE
Patient with Hgb 7.5 on admission with baseline 8.6-11. Suspected 2/2 foot wounds and JAMES based on prior studies    - iron studies  - CBC qd, trend  - transfuse pRBC for Hb < 7 g/dL; transfuse for < 8 if ACS or active bleeding and unresponsive to volume resuscitation with evidence of end-organ ischemia/>20% blood volume loss with HD instablity/>30% blood volume loss

## 2023-04-21 NOTE — NURSING
Nurses Note -- 4 Eyes      4/21/2023   6:18 AM      Skin assessed during: Admit      [] No Altered Skin Integrity Present    []Prevention Measures Documented      [x] Yes- Altered Skin Integrity Present or Discovered   [x] LDA Added if Not in Epic (Describe Wound)   [x] New Altered Skin Integrity was Present on Admit and Documented in LDA   [] Wound Image Taken    Wound Care Consulted? Yes    Attending Nurse:  Ольга Rivas RN     Second RN/Staff Member:  SUSIE Irizarry

## 2023-04-21 NOTE — ASSESSMENT & PLAN NOTE
Patient is identified as having Diastolic (HFpEF) heart failure that is Chronic. CHF is currently controlled. Latest ECHO performed and demonstrates- Results for orders placed during the hospital encounter of 02/27/23    Echo    Interpretation Summary  · The estimated ejection fraction is 65%.  · The left ventricle is normal in size with normal systolic function.  · Grade II left ventricular diastolic dysfunction.  · Normal right ventricular size with normal right ventricular systolic function.  · Mild left atrial enlargement.  · Mild-to-moderate mitral regurgitation.  · There is pulmonary hypertension.  · The estimated PA systolic pressure is 55 mmHg.  · Intermediate central venous pressure (8 mmHg).  . Continue Beta Blocker and Furosemide and monitor clinical status closely. Monitor on telemetry. Patient is off CHF pathway.  Monitor strict Is&Os and daily weights.  Place on fluid restriction of 1.5 L. Continue to stress to patient importance of self efficacy and  on diet for CHF. Last BNP reviewed- and noted below No results for input(s): BNP, BNPTRIAGEBLO in the last 168 hours..    - Holding home Lasix in setting of LOLY  - Holding Hyzaar (HCTZ) in setting of LOLY  - Patient breathing comfortably on RA, no signs of volume overload

## 2023-04-21 NOTE — ASSESSMENT & PLAN NOTE
Patient has no clinical signs of foot infection noted on physical exam. Worsening discoloration noted  to right tma site and left forefoot. Monophasic waveforms seen on left lower extremity arterial ultrasound   -Antibiotic plan per ID. Feet are not clinically infected.   - ABIs pending   - B/l xrays pending  - Vascular surgery following appreciate recs for wound healing potential  - Dressing applied by podiatry today  -Dressing changes by nursing.  -No WB restrictions from podiatry, WB in surgical shoes.   - Patient to keep dressing clean dry and intact  -Podiatry will follow

## 2023-04-21 NOTE — PROGRESS NOTES
Pharmacokinetic Initial Assessment: IV Vancomycin    Assessment/Plan:    Initiate intravenous vancomycin with loading dose of 1250 mg once with subsequent doses when random concentrations are less than 20 mcg/mL  Desired empiric serum trough concentration is 15 to 20 mcg/mL  Draw vancomycin random level on 4/22 at 0400 w/ AM labs.  Pharmacy will continue to follow and monitor vancomycin.      Please contact pharmacy at extension [phone] with any questions regarding this assessment.     Thank you for the consult,   Edie Castillo, PharmD       Patient brief summary:  Nelsy Marino is a 82 y.o. female initiated on antimicrobial therapy with IV Vancomycin for treatment of suspected bacteremia    Drug Allergies:   Review of patient's allergies indicates:   Allergen Reactions    Motrin [ibuprofen] Itching       Actual Body Weight:   68 kg    Renal Function:   Estimated Creatinine Clearance: 18.6 mL/min (A) (based on SCr of 2.1 mg/dL (H)).,     Dialysis Method (if applicable):  N/A    CBC (last 72 hours):  Recent Labs   Lab Result Units 04/20/23 2059   WBC K/uL 13.22*   Hemoglobin g/dL 7.5*   Hematocrit % 24.1*   Platelets K/uL 576*   Gran % % 69.9   Lymph % % 15.4*   Mono % % 5.8   Eosinophil % % 8.2*   Basophil % % 0.2   Differential Method  Automated       Metabolic Panel (last 72 hours):  Recent Labs   Lab Result Units 04/20/23 2059   Sodium mmol/L 137   Potassium mmol/L 3.7   Chloride mmol/L 105   CO2 mmol/L 19*   Glucose mg/dL 158*   BUN mg/dL 59*   Creatinine mg/dL 2.1*   Albumin g/dL 2.1*   Total Bilirubin mg/dL 0.3   Alkaline Phosphatase U/L 75   AST U/L 14   ALT U/L 6*       Drug levels (last 3 results):  No results for input(s): VANCOMYCINRA, VANCORANDOM, VANCOMYCINPE, VANCOPEAK, VANCOMYCINTR, VANCOTROUGH in the last 72 hours.    Microbiologic Results:  Microbiology Results (last 7 days)       Procedure Component Value Units Date/Time    Blood culture x two cultures. Draw prior to antibiotics. [[id number]]  Collected: 04/20/23 2057    Order Status: Completed Specimen: Blood from Peripheral, Upper Arm, Right Updated: 04/21/23 0545     Blood Culture, Routine No Growth to date    Narrative:      Aerobic and anaerobic    Blood culture x two cultures. Draw prior to antibiotics. [[id number]] Collected: 04/20/23 2057    Order Status: Completed Specimen: Blood from Peripheral, Upper Arm, Right Updated: 04/21/23 0545     Blood Culture, Routine No Growth to date    Narrative:      Aerobic and anaerobic

## 2023-04-21 NOTE — SUBJECTIVE & OBJECTIVE
Scheduled Meds:   amLODIPine  10 mg Oral Daily    aspirin  81 mg Oral Daily    atorvastatin  40 mg Oral QHS    carvediloL  12.5 mg Oral BID    ceFEPime (MAXIPIME) IVPB  2 g Intravenous Q24H    clopidogreL  75 mg Oral Daily    DULoxetine  30 mg Oral BID    heparin (porcine)  5,000 Units Subcutaneous Q8H    micafungin (MYCAMINE) IVPB  100 mg Intravenous Q24H    pantoprazole  40 mg Oral BID     Continuous Infusions:   lactated ringers 100 mL/hr at 04/21/23 0921     PRN Meds:dextrose 10%, dextrose 10%, dextrose, dextrose, glucagon (human recombinant), HYDROmorphone, insulin aspart U-100, naloxone, oxyCODONE, oxyCODONE, sodium chloride 0.9%, Pharmacy to dose Vancomycin consult **AND** vancomycin - pharmacy to dose    Review of patient's allergies indicates:   Allergen Reactions    Motrin [ibuprofen] Itching        Past Medical History:   Diagnosis Date    CHF (congestive heart failure)     Diabetes mellitus     DVT (deep venous thrombosis)     Hypertension     Miscarriage      Past Surgical History:   Procedure Laterality Date     femoral vascular surgery       ANGIOGRAPHY OF LOWER EXTREMITY Right 2/23/2023    Procedure: Angiogram Extremity Unilateral;  Surgeon: Danny Dunbar MD;  Location: [facility] OR;  Service: Vascular;  Laterality: Right;  RN PHONE PREOP 2/20/2023----CK CONSENT    ANGIOGRAPHY OF LOWER EXTREMITY Right 4/4/2023    Procedure: Angiogram Extremity Unilateral - possible endovascular intervention;  Surgeon: René Simms MD;  Location: [facility] OR [address] [place] [address];  Service: Vascular;  Laterality: Right;  19.6 mins.  740.81 mGy  73.3399 Gy.cm  58ml dye  local - 1ml    CREATION OF FEMORAL-FEMORAL ARTERY BYPASS WITH GRAFT Bilateral 3/2/2023    Procedure: CREATION, BYPASS, ARTERIAL, FEMORAL TO FEMORAL, USING GRAFT, LEFT FEMORAL ENDARTERECTOMY, LEFT ILIAC ARTERY STENT PLACEMENT, RIGHT LOWER EXTREMITY ANGIOGRAM AND RIGHT SFA  ANGIOPLASTY;  Surgeon: Danny Dunbar MD;  Location: [facility] OR [address] [place] [address];  Service: Vascular;   Laterality: Bilateral;   744.47 MGY  175.36 Gycm  Flouro time 20.5 mins      CREATION OF ILIOFEMORAL ARTERY BYPASS Right 3/20/2019    Procedure: CREATION, BYPASS, ARTERIAL, ILIAC TO FEMORAL, RIGHT LOWER EXTREMITY, RIGHT PROFUNDAPLASTY;  Surgeon: Danny Dunbar MD;  Location: [facility];  Service: Vascular;  Laterality: Right;  11:00AM START PER NAZANIN RICHARDS PREOP 3/13/2019  ACT'S, CELL SAVER, GRAFTS, BOOK WATER---NEED H/P  CONSENT IN AM------HGBA1C    CREATION OF MUSCLE ROTATIONAL FLAP Left 3/31/2023    Procedure: CREATION, FLAP, MUSCLE ROTATION;  Surgeon: Brandon Smiley MD;  Location: [facility] OR [address] [place] [address];  Service: Plastics;  Laterality: Left;    ESOPHAGOGASTRODUODENOSCOPY N/A 3/17/2023    Procedure: EGD (ESOPHAGOGASTRODUODENOSCOPY);  Surgeon: Gee Rodriguez MD;  [facility]  Service: Endoscopy;  Laterality: N/A;    ESOPHAGOGASTRODUODENOSCOPY N/A 4/11/2023    Procedure: EGD (ESOPHAGOGASTRODUODENOSCOPY);  Surgeon: Nazanin Kaiser MD;  [facility]  Service: Endoscopy;  Laterality: N/A;    FOOT AMPUTATION THROUGH METATARSAL Right 3/4/2023    Procedure: AMPUTATION, FOOT, TRANSMETATARSAL;  Surgeon: Benjamin Godfrey DPM;  Location: [address];  Service: Podiatry;  Laterality: Right;    HYSTERECTOMY      ?unsure cervix present    INCISION AND DRAINAGE Right 4/12/2019    Procedure: Incision and Drainage - R groin washout, possible muscle flap;  Surgeon: Danny Dunbar MD;  Location: [address];  Service: Vascular;  Laterality: Right;  groin washout    INCISION AND DRAINAGE OF GROIN Right 5/3/2019    Procedure: Incision and Drainage R groin;  Surgeon: Danny Dunbar MD;  Location: [facility] OR [address] [place] [address];  Service: Vascular;  Laterality: Right;    WOUND DEBRIDEMENT Left 3/31/2023    Procedure: DEBRIDEMENT, WOUND;  Surgeon: René Simms MD;  Location: [facility] OR 2ND FLR;  Service: Vascular;  Laterality: Left;       Family History       Problem Relation (Age  of Onset)    Diabetes Father    Hypertension Father          Tobacco Use    Smoking status: Former     Types: Cigarettes     Quit date: 1980     Years since quittin.3    Smokeless tobacco: Never   Substance and Sexual Activity    Alcohol use: No    Drug use: No    Sexual activity: Never     Review of Systems   Constitutional:  Negative for activity change, appetite change and fever.   HENT: Negative.     Eyes: Negative.    Respiratory:  Negative for cough, shortness of breath and wheezing.    Cardiovascular:  Negative for chest pain and leg swelling.   Gastrointestinal:  Negative for abdominal pain, constipation, diarrhea, nausea and vomiting.   Endocrine: Negative.    Genitourinary: Negative.    Musculoskeletal:  Positive for arthralgias and myalgias.   Skin:  Positive for color change and wound. Negative for rash.   Allergic/Immunologic: Negative.    Neurological:  Negative for headaches.   Hematological: Negative.    Psychiatric/Behavioral:  Positive for confusion.    Objective:     Vital Signs (Most Recent):  Temp: 97.3 °F (36.3 °C) (23 1514)  Pulse: 75 (23 1537)  Resp: 16 (23 1514)  BP: 129/60 (23 1514)  SpO2: (!) 94 % (23 1514)   Vital Signs (24h Range):  Temp:  [97.1 °F (36.2 °C)-98.4 °F (36.9 °C)] 97.3 °F (36.3 °C)  Pulse:  [75-98] 75  Resp:  [16-20] 16  SpO2:  [94 %-100 %] 94 %  BP: (110-215)/() 129/60     Weight: 68 kg (150 lb)  Body mass index is 24.96 kg/m².    Foot Exam    General  Orientation: alert and oriented to person, place, and time   Affect: appropriate       Right Foot/Ankle     Inspection and Palpation  Tenderness: (TMA site, mild)  Swelling: none   Skin Exam: abnormal color and ulcer (TMA incision site); no drainage (resolved) and no maceration (resolved)     Neurovascular  Dorsalis pedis: doppler  Posterior tibial: doppler  Saphenous nerve sensation: diminished  Tibial nerve sensation: diminished  Superficial peroneal nerve sensation: diminished  Deep  peroneal nerve sensation: diminished  Sural nerve sensation: diminished    Comments  -TMA now with dry stable eschar across Tma site. Worsening discoloration noted right foot. No drainage, no cellulitis, mild to moderate tenderness on palpation noted by guarding foot upon palpation.    Granular wound noted to right heel. No fluctauncor crepitus noted. No probing to bone. No other cardinal signs of infection noted.     Left Foot/Ankle      Inspection and Palpation  Tenderness: (rest pain LLE)  Swelling: none   Skin Exam: blister;     Neurovascular  Dorsalis pedis: absent  Posterior tibial: doppler  Saphenous nerve sensation: diminished  Tibial nerve sensation: diminished  Superficial peroneal nerve sensation: diminished  Deep peroneal nerve sensation: diminished  Sural nerve sensation: diminished    Comments  - blister at dorsal foot, Previously spontaneously ruptured with roof primarily intact, no concerns. No drainage or fluctuance noted.   Worsening darkening discoloration of forefoot noted, especially to 1st, 2nd, and 5th toes. No fluctuance mal odor or crepitus noted.                   Laboratory:  A1C:   Recent Labs   Lab 02/28/23  0549   HGBA1C 8.2*     Blood Cultures:   Recent Labs   Lab 04/20/23 2057   LABBLOO No Growth to date  No Growth to date     CBC:   Recent Labs   Lab 04/21/23  0500   WBC 11.42   RBC 2.92*   HGB 7.4*   HCT 24.7*   *   MCV 85   MCH 25.3*   MCHC 30.0*     CMP:   Recent Labs   Lab 04/21/23  0500   *   CALCIUM 9.7   ALBUMIN 2.1*   PROT 7.4   *   K 3.3*   CO2 19*      BUN 56*   CREATININE 1.8*   ALKPHOS 80   ALT 6*   AST 17   BILITOT 0.4     Coagulation: No results for input(s): PT, INR, APTT in the last 168 hours.  CRP: No results for input(s): CRP in the last 168 hours.  ESR: No results for input(s): SEDRATE in the last 168 hours.  Prealbumin: No results for input(s): PREALBUMIN in the last 48 hours.  Wound Cultures:   Recent Labs   Lab 03/04/23  1141  03/10/23  1340 03/31/23  0805 03/31/23  0809   LABAERO No growth PSEUDOMONAS FLUORESCENS GROUP  Few  * FELI ALBICANS  Rare  * FELI GLABRATA  Rare  *     Microbiology Results (last 7 days)       Procedure Component Value Units Date/Time    Blood culture x two cultures. Draw prior to antibiotics. [[id number]] Collected: 04/20/23 2057    Order Status: Completed Specimen: Blood from Peripheral, Upper Arm, Right Updated: 04/21/23 0545     Blood Culture, Routine No Growth to date    Narrative:      Aerobic and anaerobic    Blood culture x two cultures. Draw prior to antibiotics. [[id number]] Collected: 04/20/23 2057    Order Status: Completed Specimen: Blood from Peripheral, Upper Arm, Right Updated: 04/21/23 0545     Blood Culture, Routine No Growth to date    Narrative:      Aerobic and anaerobic          Specimen (24h ago, onward)      None

## 2023-04-21 NOTE — ED TRIAGE NOTES
"Pt is having left foot pain. Pt has necrotic tissue on her left foot, pt family states that it has been that way for three weeks. Pt also endorses left knee and left leg pain associated with a fall that happened Monday night when "she lost her balance". Patient denies LOC from fall, or hitting her head. 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   "

## 2023-04-21 NOTE — ASSESSMENT & PLAN NOTE
- Fall precautions  - Established with  PT/OT/nurse aide, will forego re-consulting PT/OT at this time, do not expect SNF/LTAC placement

## 2023-04-21 NOTE — ASSESSMENT & PLAN NOTE
Nelsy Marino is an 82yoF with known PAD s/p fem-fem and fem-SMA bypass in March of this year. This was complicated by wound dehiscence, exposed graft requiring a right rectus femoris flap. The patient returns to the hospital with complaints of persistent left foot pain, left foot discoloration. Vascular surgery consulted for evaluation.     - patient seen and examined  - labs and imaging reviewed   - arterial US reviewed  - biphasic doppler signals to left AT and PT   - recommend repeat ARCHIE and toe pressures  - recommend podiatry and wound care consult  - recommend pain management consult   - PT/OT  - will follow up imaging results  - please call with questions

## 2023-04-21 NOTE — SUBJECTIVE & OBJECTIVE
"Medications Prior to Admission   Medication Sig Dispense Refill Last Dose    amlodipine (NORVASC) 10 MG tablet Take 10 mg by mouth once daily.       aspirin (ECOTRIN) 81 MG EC tablet Take 81 mg by mouth once daily.       atorvastatin (LIPITOR) 40 MG tablet Take 1 tablet (40 mg total) by mouth every evening. 30 tablet 0     benzonatate (TESSALON) 100 MG capsule Take 1 capsule (100 mg total) by mouth 3 (three) times daily as needed for Cough. 20 capsule 0     BLOOD SUGAR DIAGNOSTIC (TRUE METRIX GLUCOSE TEST STRIP MISC) by Misc.(Non-Drug; Combo Route) route.       carvediloL (COREG) 12.5 MG tablet Take 12.5 mg by mouth 2 (two) times daily.       cephALEXin (KEFLEX) 500 MG capsule Take 2 capsules (1,000 mg total) by mouth every 12 (twelve) hours. 60 capsule 0     clopidogreL (PLAVIX) 75 mg tablet Take 1 tablet (75 mg total) by mouth once daily. 30 tablet 0     DULoxetine (CYMBALTA) 30 MG capsule Take 1 capsule (30 mg total) by mouth 2 (two) times daily. 60 capsule 0     furosemide (LASIX) 20 MG tablet Take 1 tablet (20 mg total) by mouth once daily. 30 tablet 0     insulin aspart protamine-insulin aspart (NOVOLOG MIX 70-30FLEXPEN U-100) 100 unit/mL (70-30) InPn pen Inject 12 Units into the skin 2 (two) times daily before meals.  0     insulin syringe-needle,dispos. 0.3 mL 30 gauge x 5/16" Syrg by Misc.(Non-Drug; Combo Route) route.       losartan-hydrochlorothiazide 100-25 mg (HYZAAR) 100-25 mg per tablet Take 1 tablet by mouth once daily.       metformin (GLUCOPHAGE) 1000 MG tablet Take 1,000 mg by mouth 2 (two) times daily with meals.       ondansetron (ZOFRAN) 8 MG tablet   0     oxyCODONE (ROXICODONE) 5 MG immediate release tablet Take 1 tablet (5 mg total) by mouth every 6 (six) hours as needed for Pain. 12 tablet 0     pantoprazole (PROTONIX) 40 MG tablet Take 1 tablet (40 mg total) by mouth 2 (two) times daily. 60 tablet 1     pen needle, diabetic 32 gauge x 5/32" Ndle Inject 1 each into the skin 4 (four) times " daily. 100 each 0        Review of patient's allergies indicates:   Allergen Reactions    Motrin [ibuprofen] Itching       Past Medical History:   Diagnosis Date    CHF (congestive heart failure)     Diabetes mellitus     DVT (deep venous thrombosis)     Hypertension     Miscarriage      Past Surgical History:   Procedure Laterality Date     femoral vascular surgery       ANGIOGRAPHY OF LOWER EXTREMITY Right 2/23/2023    Procedure: Angiogram Extremity Unilateral;  Surgeon: Danny Dunbar MD;  Location: [facility];  Service: Vascular;  Laterality: Right;  RN PHONE PREOP 2/20/2023----CK CONSENT    ANGIOGRAPHY OF LOWER EXTREMITY Right 4/4/2023    Procedure: Angiogram Extremity Unilateral - possible endovascular intervention;  Surgeon: René Simms MD;  Location: [facility] OR [address] [place] [address];  Service: Vascular;  Laterality: Right;  19.6 mins.  740.81 mGy  73.3399 Gy.cm  58ml dye  local - 1ml    CREATION OF FEMORAL-FEMORAL ARTERY BYPASS WITH GRAFT Bilateral 3/2/2023    Procedure: CREATION, BYPASS, ARTERIAL, FEMORAL TO FEMORAL, USING GRAFT, LEFT FEMORAL ENDARTERECTOMY, LEFT ILIAC ARTERY STENT PLACEMENT, RIGHT LOWER EXTREMITY ANGIOGRAM AND RIGHT SFA  ANGIOPLASTY;  Surgeon: Danny Dunbar MD;  Location: [facility] OR [address] [place] [address];  Service: Vascular;  Laterality: Bilateral;   744.47 MGY  175.36 Gycm  Flouro time 20.5 mins      CREATION OF ILIOFEMORAL ARTERY BYPASS Right 3/20/2019    Procedure: CREATION, BYPASS, ARTERIAL, ILIAC TO FEMORAL, RIGHT LOWER EXTREMITY, RIGHT PROFUNDAPLASTY;  Surgeon: Danny Dunbar MD;  Location: [facility] OR;  Service: Vascular;  Laterality: Right;  11:00AM START PER ANNE RICHARDS PREOP 3/13/2019  ACT'S, CELL SAVER, GRAFTS, BOOK WATER---NEED H/P  CONSENT IN AM------HGBA1C    CREATION OF MUSCLE ROTATIONAL FLAP Left 3/31/2023    Procedure: CREATION, FLAP, MUSCLE ROTATION;  Surgeon: Brandon Smiley MD;  [facility];  Service: Plastics;  Laterality: Left;    ESOPHAGOGASTRODUODENOSCOPY N/A  3/17/2023    Procedure: EGD (ESOPHAGOGASTRODUODENOSCOPY);  Surgeon: Gee Rodriguez MD;  Location: [facility] ENDO (2ND FLR);  Service: Endoscopy;  Laterality: N/A;    ESOPHAGOGASTRODUODENOSCOPY N/A 2023    Procedure: EGD (ESOPHAGOGASTRODUODENOSCOPY);  Surgeon: Nazanin Kaiser MD;  Location: [facility] ENDO (2ND FLR);  Service: Endoscopy;  Laterality: N/A;    FOOT AMPUTATION THROUGH METATARSAL Right 3/4/2023    Procedure: AMPUTATION, FOOT, TRANSMETATARSAL;  Surgeon: Benjamin Godfrey DPM;  [facility];  Service: Podiatry;  Laterality: Right;    HYSTERECTOMY      ?unsure cervix present    INCISION AND DRAINAGE Right 2019    Procedure: Incision and Drainage - R groin washout, possible muscle flap;  Surgeon: Danny Dunbar MD;  [facility];  Service: Vascular;  Laterality: Right;  groin washout    INCISION AND DRAINAGE OF GROIN Right 5/3/2019    Procedure: Incision and Drainage R groin;  Surgeon: Danny Dunbar MD;  [facility];  Service: Vascular;  Laterality: Right;    WOUND DEBRIDEMENT Left 3/31/2023    Procedure: DEBRIDEMENT, WOUND;  Surgeon: René Simms MD;  [facility];  Service: Vascular;  Laterality: Left;     Family History       Problem Relation (Age of Onset)    Diabetes Father    Hypertension Father          Tobacco Use    Smoking status: Former     Types: Cigarettes     Quit date: 1980     Years since quittin.3    Smokeless tobacco: Never   Substance and Sexual Activity    Alcohol use: No    Drug use: No    Sexual activity: Never     Review of Systems   Constitutional:  Negative for activity change, appetite change, fatigue and fever.   HENT: Negative.     Eyes: Negative.    Respiratory:  Negative for cough, chest tightness and shortness of breath.    Cardiovascular:  Negative for chest pain and leg swelling.   Gastrointestinal:  Negative for abdominal distention and abdominal pain.   Skin:  Positive for color change and  wound.   Objective:     Vital Signs (Most Recent):  Temp: 97.2 °F (36.2 °C) (04/21/23 0707)  Pulse: 91 (04/21/23 0707)  Resp: 18 (04/21/23 0906)  BP: (!) 158/70 (04/21/23 0707)  SpO2: 97 % (04/21/23 0707)   Vital Signs (24h Range):  Temp:  [97.2 °F (36.2 °C)-98.4 °F (36.9 °C)] 97.2 °F (36.2 °C)  Pulse:  [76-98] 91  Resp:  [16-20] 18  SpO2:  [97 %-100 %] 97 %  BP: (110-215)/() 158/70     Weight: 68 kg (150 lb)  Body mass index is 24.96 kg/m².    Physical Exam  Vitals and nursing note reviewed.   Constitutional:       Appearance: She is ill-appearing.   HENT:      Nose: Nose normal.   Cardiovascular:      Rate and Rhythm: Normal rate and regular rhythm.      Comments: Biphasic doppler signals to left AT and PT pulses  Pulmonary:      Effort: Pulmonary effort is normal. No respiratory distress.   Abdominal:      General: Abdomen is flat. There is no distension.      Palpations: Abdomen is soft.   Skin:     Comments: Right TMA with ACE wrap in place  Left foot with skin discoloration to the forefoot, worst between the great and second toe   Neurological:      Mental Status: She is alert.       Significant Labs:  CBC:   Recent Labs   Lab 04/21/23  0500   WBC 11.42   RBC 2.92*   HGB 7.4*   HCT 24.7*   *   MCV 85   MCH 25.3*   MCHC 30.0*     CMP:   Recent Labs   Lab 04/21/23  0500   *   CALCIUM 9.7   ALBUMIN 2.1*   PROT 7.4   *   K 3.3*   CO2 19*      BUN 56*   CREATININE 1.8*   ALKPHOS 80   ALT 6*   AST 17   BILITOT 0.4       Significant Diagnostics:  I have reviewed all pertinent imaging results/findings within the past 24 hours.

## 2023-04-21 NOTE — ASSESSMENT & PLAN NOTE
Patient's FSGs are uncontrolled due to hyperglycemia on current medication regimen.  Last A1c reviewed-   Lab Results   Component Value Date    HGBA1C 8.2 (H) 02/28/2023     Most recent fingerstick glucose reviewed- No results for input(s): POCTGLUCOSE in the last 24 hours.  Current correctional scale  Low  Maintain anti-hyperglycemic dose as follows-   Antihyperglycemics (From admission, onward)    Start     Stop Route Frequency Ordered    04/21/23 0355  insulin aspart U-100 pen 0-5 Units         -- SubQ Before meals & nightly PRN 04/21/23 0255        Hold Oral hypoglycemics while patient is in the hospital.

## 2023-04-21 NOTE — ASSESSMENT & PLAN NOTE
Patient with acute kidney injury likely due to IVVD/dehydration LOLY is currently worsening. Labs reviewed- Renal function/electrolytes with Estimated Creatinine Clearance: 19.6 mL/min (A) (based on SCr of 2.1 mg/dL (H)). according to latest data. Monitor urine output and serial BMP and adjust therapy as needed. Avoid nephrotoxins and renally dose meds for GFR listed above.     Cr 2.1 (baseline 0.8-1.5).  Patient dry on physical exam.  Poor oral intake, most likely prerenal in origin.    Recent Labs   Lab 04/20/23 2059      K 3.7      CO2 19*   BUN 59*   CREATININE 2.1*       Plan:    - CMP qd, trend Cr  - strict I/O  - daily weights  - s/p 1 L IVF in ED  - renally dose all medications  - avoid nephrotoxic agents, NSAIDs, IV contrast, ACE/ARB  - If LOLY doesn't improve consider US retroperitoneal

## 2023-04-21 NOTE — HPI
Nelsy Marino is an 82yoF who is well known to the vascular surgery service. She has a past medical history significant for HTN, DM2, history of DVTs and peripheral arterial disease. She underwent a left to right fem to SFA bypass and right SFA angioplasty on 3/2/23. She was again admitted on 3/29/23 with concerns for left groin wound dehiscence. She was taken to the operating room at that time for debridement and found to have exposed graft at the debrided wound bed. She underwent a left rectus femoris flap at that time for coverage. She progressed well during that admission, maintained right lower extremity distal pulses, and continued to endorse right foot pain at that time.     She returns to the hospital with complaints of persistent left foot pain, skin discoloration. Vascular surgery consulted for evaluation.

## 2023-04-21 NOTE — ED PROVIDER NOTES
Encounter Date: 4/20/2023       History     Chief Complaint   Patient presents with    Foot Injury     Pt coming from home with necrotic tissue on left foot that family states has been there for several weeks. Per EMS pt has diabetes and has had this problem before on her right foot where she had her right toes amputated.     Ms. Marino 83 yo F with PMH CHF, HTN, DM, DVT, femoral bypass, R toe amputation 2 months ago presents with L foot wound and pain that has become discolored and worse within the past 2 weeks. Patient was discharged from the hospital 2 weeks prior on Cefepime and Micafungin for R foot and bypass. Currently has a left thigh drain in place from bypass surgery. Patient has been more somnolent since discharge from hospital and lying in bed most of the day according to her . Denies recent illness, fever, chest pain, SOB, abd pain. Patient does not walk and has a HH nurse weekly. AxO x2 on exam and not cooperative on exam. No pulses on exam with palpation or doppler.     The history is provided by the patient, the spouse and medical records. No  was used.   Review of patient's allergies indicates:   Allergen Reactions    Motrin [ibuprofen] Itching     Past Medical History:   Diagnosis Date    CHF (congestive heart failure)     Diabetes mellitus     DVT (deep venous thrombosis)     Hypertension     Miscarriage      Past Surgical History:   Procedure Laterality Date     femoral vascular surgery       ANGIOGRAPHY OF LOWER EXTREMITY Right 2/23/2023    Procedure: Angiogram Extremity Unilateral;  Surgeon: Danny Dunbar MD;  Location: [facility] OR;  Service: Vascular;  Laterality: Right;  RN PHONE PREOP 2/20/2023----CK CONSENT    ANGIOGRAPHY OF LOWER EXTREMITY Right 4/4/2023    Procedure: Angiogram Extremity Unilateral - possible endovascular intervention;  Surgeon: René Simms MD;  Location: [facility] OR [address] [place] [address];  Service: Vascular;  Laterality: Right;  19.6 mins.  740.81  mGy  73.3399 Gy.cm  58ml dye  local - 1ml    CREATION OF FEMORAL-FEMORAL ARTERY BYPASS WITH GRAFT Bilateral 3/2/2023    Procedure: CREATION, BYPASS, ARTERIAL, FEMORAL TO FEMORAL, USING GRAFT, LEFT FEMORAL ENDARTERECTOMY, LEFT ILIAC ARTERY STENT PLACEMENT, RIGHT LOWER EXTREMITY ANGIOGRAM AND RIGHT SFA  ANGIOPLASTY;  Surgeon: Danny Dunbar MD;  Location: [facility] OR [address] [place] [address];  Service: Vascular;  Laterality: Bilateral;   744.47 MGY  175.36 Gycm  Flouro time 20.5 mins      CREATION OF ILIOFEMORAL ARTERY BYPASS Right 3/20/2019    Procedure: CREATION, BYPASS, ARTERIAL, ILIAC TO FEMORAL, RIGHT LOWER EXTREMITY, RIGHT PROFUNDAPLASTY;  Surgeon: Danny Dunbar MD;  Location: [facility];  Service: Vascular;  Laterality: Right;  11:00AM START PER NAZANIN RICHARDS PREOP 3/13/2019  ACT'S, CELL SAVER, GRAFTS, BOOK WATER---NEED H/P  CONSENT IN AM------HGBA1C    CREATION OF MUSCLE ROTATIONAL FLAP Left 3/31/2023    Procedure: CREATION, FLAP, MUSCLE ROTATION;  Surgeon: Brandon Smiley MD;  Location: [facility] OR [address] [place] [address];  Service: Plastics;  Laterality: Left;    ESOPHAGOGASTRODUODENOSCOPY N/A 3/17/2023    Procedure: EGD (ESOPHAGOGASTRODUODENOSCOPY);  Surgeon: Gee Rodriguez MD;  Location: [facility] ([address] [place] [address]);  Service: Endoscopy;  Laterality: N/A;    ESOPHAGOGASTRODUODENOSCOPY N/A 4/11/2023    Procedure: EGD (ESOPHAGOGASTRODUODENOSCOPY);  Surgeon: Nazanin Kaiser MD;  [facility]  Service: Endoscopy;  Laterality: N/A;    FOOT AMPUTATION THROUGH METATARSAL Right 3/4/2023    Procedure: AMPUTATION, FOOT, TRANSMETATARSAL;  Surgeon: Benjamin Godfrey DPM;  Location: [facility] OR [address] [place] [address];  Service: Podiatry;  Laterality: Right;    HYSTERECTOMY      ?unsure cervix present    INCISION AND DRAINAGE Right 4/12/2019    Procedure: Incision and Drainage - R groin washout, possible muscle flap;  Surgeon: Danny Dunbar MD;  Location: [facility] OR 2ND FLR;  Service: Vascular;  Laterality: Right;  groin washout    INCISION  AND DRAINAGE OF GROIN Right 5/3/2019    Procedure: Incision and Drainage R groin;  Surgeon: Danny Dunbar MD;  Location: [facility] OR [address] [place] [address];  Service: Vascular;  Laterality: Right;    WOUND DEBRIDEMENT Left 3/31/2023    Procedure: DEBRIDEMENT, WOUND;  Surgeon: René Simms MD;  [facility];  Service: Vascular;  Laterality: Left;     Family History   Problem Relation Age of Onset    Diabetes Father     Hypertension Father      Social History     Tobacco Use    Smoking status: Former     Types: Cigarettes     Quit date:      Years since quittin.3    Smokeless tobacco: Never   Substance Use Topics    Alcohol use: No    Drug use: No     Review of Systems   Constitutional:  Positive for activity change. Negative for chills and fever.   HENT:  Negative for sore throat.    Eyes:  Negative for visual disturbance.   Respiratory:  Negative for shortness of breath.    Cardiovascular:  Negative for chest pain and leg swelling.   Gastrointestinal:  Negative for abdominal distention, abdominal pain, nausea and vomiting.   Genitourinary:  Negative for hematuria.   Musculoskeletal:  Positive for myalgias. Negative for back pain.   Skin:  Positive for color change and wound. Negative for rash.   Neurological:  Positive for weakness. Negative for dizziness and headaches.   Psychiatric/Behavioral:  Positive for confusion.      Physical Exam     Initial Vitals [23 1906]   BP Pulse Resp Temp SpO2   (!) 110/54 86 18 98.1 °F (36.7 °C) 98 %      MAP       --         Physical Exam    Nursing note and vitals reviewed.  Constitutional: She appears well-developed and well-nourished.   HENT:   Head: Normocephalic.   Eyes: Conjunctivae and EOM are normal. Right eye exhibits no discharge. Left eye exhibits no discharge.   Cardiovascular:  Normal rate, regular rhythm and normal heart sounds.           No murmur heard.  Pulses:       Dorsalis pedis pulses are 1+ on the right side and 0 on the left side.         Posterior tibial pulses are 1+ on the right side and 0 on the left side.   Pulmonary/Chest: Breath sounds normal. No respiratory distress. She has no wheezes. She has no rhonchi. She exhibits no tenderness.   Abdominal: Abdomen is soft. She exhibits no distension. There is no abdominal tenderness.   Musculoskeletal:         General: Tenderness present. No edema. Normal range of motion.     Neurological:   AxO x2. Somnolent. Non cooperative on neuro exam. Responds to verbal and tactile stimuli.    Skin: Skin is warm and dry. No erythema.   Psychiatric: She has a normal mood and affect. Her behavior is normal. Thought content normal.         ED Course   Procedures  Labs Reviewed   CBC W/ AUTO DIFFERENTIAL - Abnormal; Notable for the following components:       Result Value    WBC 13.22 (*)     RBC 2.88 (*)     Hemoglobin 7.5 (*)     Hematocrit 24.1 (*)     MCH 26.0 (*)     MCHC 31.1 (*)     RDW 15.1 (*)     Platelets 576 (*)     Gran # (ANC) 9.2 (*)     Immature Grans (Abs) 0.07 (*)     Eos # 1.1 (*)     Lymph % 15.4 (*)     Eosinophil % 8.2 (*)     All other components within normal limits   COMPREHENSIVE METABOLIC PANEL - Abnormal; Notable for the following components:    CO2 19 (*)     Glucose 158 (*)     BUN 59 (*)     Creatinine 2.1 (*)     Albumin 2.1 (*)     ALT 6 (*)     eGFR 23.1 (*)     All other components within normal limits   CULTURE, BLOOD   CULTURE, BLOOD   URINALYSIS, REFLEX TO URINE CULTURE     EKG Readings: (Independently Interpreted)   Initial Reading: No STEMI. Rhythm: Normal Sinus Rhythm. Heart Rate: 82. Conduction: Normal.     Imaging Results               US Lower Extremity Arteries Left (Final result)  Result time 04/21/23 01:05:17      Final result by Keon Crawley MD (04/21/23 01:05:17)                   Impression:      Diminished flow throughout the left lower extremity.  Monophasic waveforms suggesting atherosclerotic disease.    Blunted upstroke waveforms in the superficial femoral  artery and distally.  Findings may be seen in the setting of upstream stenosis, noting there is no doubling of peak systolic velocities observed.    This report was flagged in Epic as abnormal.    Electronically signed by resident: Dileep Olsen  Date:    04/21/2023  Time:    00:33    Electronically signed by: Keon Crawley MD  Date:    04/21/2023  Time:    01:05               Narrative:    EXAMINATION:  US LOWER EXTREMITY ARTERIES LEFT    CLINICAL HISTORY:  Other specified symptoms and signs involving the circulatory and respiratory systems    TECHNIQUE:  Left lower extremity arterial duplex ultrasound examination performed. Multiple gray scale and color doppler images were obtained in addition to waveform analysis.    COMPARISON:  CTA runoff 03/29/2023.    FINDINGS:  Reported postoperative change with patent left femoral-right SFA bypass, and occluded femoral-femoral bypass graft.    The peak systolic velocities on the left are as follows, in centimeters/second:    Common femoral artery: 52    Deep femoral artery: 62    Superficial femoral artery, proximal: 52    Superficial femoral artery, mid portion: 40    Superficial femoral artery, distal: 67    Proximal popliteal artery: 31    Distal popliteal artery: 71    Anterior tibial artery: 29    Posterior tibial artery: 51    Monophasic arterial waveforms throughout the left lower extremity.  Blunted upstroke waveforms in the superficial femoral artery and distally.                                       CT Head Without Contrast (Final result)  Result time 04/20/23 23:25:04      Final result by Keon Crawley MD (04/20/23 23:25:04)                   Impression:      No evidence of acute intracranial pathology.    Generalized cerebral volume loss with chronic microvascular ischemic change.    Electronically signed by resident: Dileep Olsen  Date:    04/20/2023  Time:    23:09    Electronically signed by: Keon Crawley MD  Date:    04/20/2023  Time:    23:25                Narrative:    EXAMINATION:  CT HEAD WITHOUT CONTRAST    CLINICAL HISTORY:  Mental status change, unknown cause;    TECHNIQUE:  Low dose axial CT images obtained throughout the head without the use of intravenous contrast.  Axial, sagittal and coronal reconstructions were performed.    COMPARISON:  None.    FINDINGS:  Prominence of the ventricles and sulci compatible with  generalized cerebral volume loss.  No hydrocephalus.    Hyperattenuation of the right basal ganglia, likely benign calcification.  Small focus of hypoattenuation left corona radiata, may reflect remote lacunar-type infarct.  Patchy and confluent hypoattenuation in the supratentorial white matter, nonspecific but most likely reflecting chronic microvascular ischemic changes. No recent or remote major vascular distribution infarct. No acute hemorrhage.  No mass effect or midline shift.    No extra-axial blood or fluid collections.    No acute displaced calvarial fracture.  Mastoid air cells and paranasal sinuses are essentially clear.  Bilateral lens implants.                                       X-Ray Chest AP Portable (Final result)  Result time 04/20/23 21:18:42      Final result by Keon Crawley MD (04/20/23 21:18:42)                   Impression:      Interval resolution of bilateral infiltrates.  No acute findings.      Electronically signed by: Keon Crawley MD  Date:    04/20/2023  Time:    21:18               Narrative:    EXAMINATION:  XR CHEST AP PORTABLE    CLINICAL HISTORY:  Sepsis;    TECHNIQUE:  Single frontal view of the chest was performed.    COMPARISON:  04/05/2023.    FINDINGS:  Right-sided PICC line appears unchanged from prior.  Rotated to the right.    Interval resolution bilateral infiltrates.    Heart and lungs otherwise appear unchanged when allowing for differences in technique and positioning.                                       Medications   ceFEPIme (MAXIPIME) 2 g in dextrose 5 % in water (D5W) 5 %  50 mL IVPB (MB+) (0 g Intravenous Stopped 4/20/23 2156)   lactated ringers bolus 1,000 mL (0 mLs Intravenous Stopped 4/20/23 2230)     Medical Decision Making:   Initial Assessment:   83 yo F presents with L foot wound/pain and somnolence for the past 2 weeks. Recent R toes amputation and L femoral bypass w/ drain in place. No pulses on palpation or with doppler US. Extremities warm. Patient is a diabetic, but has  check BG 4x day and receives insulin. Has been receiving cefepime and micafungin through PICC for past few weeks. Will obtain US art L LE, sepsis work up, and CT head.   Differential Diagnosis:   LOLY  Sepsis  Foot wound infection  LLE arterial occlusion  Independently Interpreted Test(s):   I have ordered and independently interpreted X-rays - see prior notes.  I have ordered and independently interpreted EKG Reading(s) - see prior notes  Clinical Tests:   Lab Tests: Ordered and Reviewed  The following lab test(s) were unremarkable: CBC, CMP and Lactate  Radiological Study: Ordered and Reviewed  Medical Tests: Ordered and Reviewed  ED Management:  Arrived to ED somnolent, HDS, AxO x 2, non-cooperative on exam.  states mental status has been stable since discharge form hospital weeks ago.   Initial exam: unable to palpate PT, DP on LLE or on doppler. Extremity slightly warm.   CMP: Cr 2.1; BUN 59; Glucose 158  CBC: wbc 13K; H/H 7.5/24  Unable to obtain CTA LLE due to new LOLY; will order Arterial US LLE  CXR unremarkable  Lactate 0.5  Started on LR bolus  Blood cx pending  UA pending  CT Head negative for acute pathology  Cefepime given  Arterial US LLE shows diminished blood flow but no occlusion.    Will admit to HM. Assigned to Dr. Benavidez @ 0200             ED Course as of 04/21/23 0213   Thu Apr 20, 2023 2137 Creatinine(!): 2.1 [KS]      ED Course User Index  [KS] Demetris James DO                 Clinical Impression:   Final diagnoses:  [R40.0] Somnolence  [R09.89] Diminished pulses in  lower extremity  [R09.89] Absent pedal pulses  [N17.9] LOLY (acute kidney injury) (Primary)        ED Disposition Condition    Observation Stable                Demetris James DO  Resident  04/21/23 [id number]

## 2023-04-21 NOTE — ASSESSMENT & PLAN NOTE
Recently discharged home with IV Cefepime 2g Q12 hours + IV Micafungin 100mg Q24 hours for estimated end of therapy date of 5/16/2023. For R toe amputation. Patient's  reports adherence with IV Abx. L foot with darkening skin changes, which alarmed  as this was how skin presented prior to toe amputations of R foot. RLE warm to touch with L US Lower Extremity Arteries demonstrating diminished flow throughout the left lower extremity. Monophasic waveforms suggesting atherosclerotic disease.     - R heel with skin and ulcer change, patient was due to f/u with Podiatry outpatient  - Podiatry consulted with appreciated recs for foot wound management  - Appreciated evaluation of L foot  - Continue ASA, atorvastatin, Plavix  - Continue Cefepime, Micafungin per prior ID recs. Starting Vanc pending BCx results  - Given US findings will not consult Vascular at this time

## 2023-04-21 NOTE — CONSULTS
[facility] - [facility] Surg  Podiatry  Consult Note    Patient Name: Nelsy Marino  MRN: [id number]  Admission Date: 4/20/2023  Hospital Length of Stay: 0 days  Attending Physician: Kay Benavidez MD  Primary Care Provider: [facility]     Inpatient consult to Podiatry  Consult performed by: Danisha Hanley MD  Consult ordered by: Scott Cash MD  Reason for consult: b/l foot wound and discoloration        Subjective:     History of Present Illness:  82-year-old female with a past medical history of severe PAD, hypertension, type 2 diabetes, HFpEF, L femoral bypass and L leg flap, R toe ampuations 2 months prior with presenting after  noted waxing/waning confusion since her discharge from hospital two weeks ago. Podiatry was consulted for bilateral foot wounds and discoloration. History primarily given per family. Per family patient is usually a lot more alert, but since her recent hospital stay, has been confused, and more easily irritated. Per family says they have noticed worsening discoloration to her left foot, and have  noticed she is very sensitive to to touching her feet b/l. Family denies any fever chills nausea or vomiting. Family has no other pedal complaints at this time.       Scheduled Meds:   amLODIPine  10 mg Oral Daily    aspirin  81 mg Oral Daily    atorvastatin  40 mg Oral QHS    carvediloL  12.5 mg Oral BID    ceFEPime (MAXIPIME) IVPB  2 g Intravenous Q24H    clopidogreL  75 mg Oral Daily    DULoxetine  30 mg Oral BID    heparin (porcine)  5,000 Units Subcutaneous Q8H    micafungin (MYCAMINE) IVPB  100 mg Intravenous Q24H    pantoprazole  40 mg Oral BID     Continuous Infusions:   lactated ringers 100 mL/hr at 04/21/23 0921     PRN Meds:dextrose 10%, dextrose 10%, dextrose, dextrose, glucagon (human recombinant), HYDROmorphone, insulin aspart U-100, naloxone, oxyCODONE, oxyCODONE, sodium chloride 0.9%, Pharmacy to dose Vancomycin consult **AND** vancomycin - pharmacy to  dose    Review of patient's allergies indicates:   Allergen Reactions    Motrin [ibuprofen] Itching        Past Medical History:   Diagnosis Date    CHF (congestive heart failure)     Diabetes mellitus     DVT (deep venous thrombosis)     Hypertension     Miscarriage      Past Surgical History:   Procedure Laterality Date     femoral vascular surgery       ANGIOGRAPHY OF LOWER EXTREMITY Right 2/23/2023    Procedure: Angiogram Extremity Unilateral;  Surgeon: Danny Dunbar MD;  Location: [facility];  Service: Vascular;  Laterality: Right;  RN PHONE PREOP 2/20/2023----CK CONSENT    ANGIOGRAPHY OF LOWER EXTREMITY Right 4/4/2023    Procedure: Angiogram Extremity Unilateral - possible endovascular intervention;  Surgeon: René Simms MD;  Location: [facility] OR [address] [place] [address];  Service: Vascular;  Laterality: Right;  19.6 mins.  740.81 mGy  73.3399 Gy.cm  58ml dye  local - 1ml    CREATION OF FEMORAL-FEMORAL ARTERY BYPASS WITH GRAFT Bilateral 3/2/2023    Procedure: CREATION, BYPASS, ARTERIAL, FEMORAL TO FEMORAL, USING GRAFT, LEFT FEMORAL ENDARTERECTOMY, LEFT ILIAC ARTERY STENT PLACEMENT, RIGHT LOWER EXTREMITY ANGIOGRAM AND RIGHT SFA  ANGIOPLASTY;  Surgeon: Danny Dunbar MD;  Location: [facility] OR [address] [place] [address];  Service: Vascular;  Laterality: Bilateral;   744.47 MGY  175.36 Gycm  Flouro time 20.5 mins      CREATION OF ILIOFEMORAL ARTERY BYPASS Right 3/20/2019    Procedure: CREATION, BYPASS, ARTERIAL, ILIAC TO FEMORAL, RIGHT LOWER EXTREMITY, RIGHT PROFUNDAPLASTY;  Surgeon: Danny Dunbar MD;  Location: [facility] OR;  Service: Vascular;  Laterality: Right;  11:00AM START PER ANNE RICHARDS PREOP 3/13/2019  ACT'S, CELL SAVER, GRAFTS, BOOK WATER---NEED H/P  CONSENT IN AM------HGBA1C    CREATION OF MUSCLE ROTATIONAL FLAP Left 3/31/2023    Procedure: CREATION, FLAP, MUSCLE ROTATION;  Surgeon: Brandon Smiley MD;  [facility];  Service: Plastics;  Laterality: Left;    ESOPHAGOGASTRODUODENOSCOPY N/A  3/17/2023    Procedure: EGD (ESOPHAGOGASTRODUODENOSCOPY);  Surgeon: Gee Rodriguez MD;  Location: [facility] ENDO (2ND FLR);  Service: Endoscopy;  Laterality: N/A;    ESOPHAGOGASTRODUODENOSCOPY N/A 2023    Procedure: EGD (ESOPHAGOGASTRODUODENOSCOPY);  Surgeon: Nazanin Kaiser MD;  Location: [facility] ENDO (2ND FLR);  Service: Endoscopy;  Laterality: N/A;    FOOT AMPUTATION THROUGH METATARSAL Right 3/4/2023    Procedure: AMPUTATION, FOOT, TRANSMETATARSAL;  Surgeon: Benjamin Godfrey DPM;  [facility];  Service: Podiatry;  Laterality: Right;    HYSTERECTOMY      ?unsure cervix present    INCISION AND DRAINAGE Right 2019    Procedure: Incision and Drainage - R groin washout, possible muscle flap;  Surgeon: Danny Dunbar MD;  [facility];  Service: Vascular;  Laterality: Right;  groin washout    INCISION AND DRAINAGE OF GROIN Right 5/3/2019    Procedure: Incision and Drainage R groin;  Surgeon: Danny Dunbar MD;  [facility];  Service: Vascular;  Laterality: Right;    WOUND DEBRIDEMENT Left 3/31/2023    Procedure: DEBRIDEMENT, WOUND;  Surgeon: René Simms MD;  [facility];  Service: Vascular;  Laterality: Left;       Family History       Problem Relation (Age of Onset)    Diabetes Father    Hypertension Father          Tobacco Use    Smoking status: Former     Types: Cigarettes     Quit date: 1980     Years since quittin.3    Smokeless tobacco: Never   Substance and Sexual Activity    Alcohol use: No    Drug use: No    Sexual activity: Never     Review of Systems   Constitutional:  Negative for activity change, appetite change and fever.   HENT: Negative.     Eyes: Negative.    Respiratory:  Negative for cough, shortness of breath and wheezing.    Cardiovascular:  Negative for chest pain and leg swelling.   Gastrointestinal:  Negative for abdominal pain, constipation, diarrhea, nausea and vomiting.   Endocrine: Negative.     Genitourinary: Negative.    Musculoskeletal:  Positive for arthralgias and myalgias.   Skin:  Positive for color change and wound. Negative for rash.   Allergic/Immunologic: Negative.    Neurological:  Negative for headaches.   Hematological: Negative.    Psychiatric/Behavioral:  Positive for confusion.    Objective:     Vital Signs (Most Recent):  Temp: 97.3 °F (36.3 °C) (04/21/23 1514)  Pulse: 75 (04/21/23 1537)  Resp: 16 (04/21/23 1514)  BP: 129/60 (04/21/23 1514)  SpO2: (!) 94 % (04/21/23 1514)   Vital Signs (24h Range):  Temp:  [97.1 °F (36.2 °C)-98.4 °F (36.9 °C)] 97.3 °F (36.3 °C)  Pulse:  [75-98] 75  Resp:  [16-20] 16  SpO2:  [94 %-100 %] 94 %  BP: (110-215)/() 129/60     Weight: 68 kg (150 lb)  Body mass index is 24.96 kg/m².    Foot Exam    General  Orientation: alert and oriented to person, place, and time   Affect: appropriate       Right Foot/Ankle     Inspection and Palpation  Tenderness: (TMA site, mild)  Swelling: none   Skin Exam: abnormal color and ulcer (TMA incision site); no drainage (resolved) and no maceration (resolved)     Neurovascular  Dorsalis pedis: doppler  Posterior tibial: doppler  Saphenous nerve sensation: diminished  Tibial nerve sensation: diminished  Superficial peroneal nerve sensation: diminished  Deep peroneal nerve sensation: diminished  Sural nerve sensation: diminished    Comments  -TMA now with dry stable eschar across Tma site. Worsening discoloration noted right foot. No drainage, no cellulitis, mild to moderate tenderness on palpation noted by guarding foot upon palpation.    Granular wound noted to right heel. No fluctauncor crepitus noted. No probing to bone. No other cardinal signs of infection noted.     Left Foot/Ankle      Inspection and Palpation  Tenderness: (rest pain LLE)  Swelling: none   Skin Exam: blister;     Neurovascular  Dorsalis pedis: absent  Posterior tibial: doppler  Saphenous nerve sensation: diminished  Tibial nerve sensation:  diminished  Superficial peroneal nerve sensation: diminished  Deep peroneal nerve sensation: diminished  Sural nerve sensation: diminished    Comments  - blister at dorsal foot, Previously spontaneously ruptured with roof primarily intact, no concerns. No drainage or fluctuance noted.   Worsening darkening discoloration of forefoot noted, especially to 1st, 2nd, and 5th toes. No fluctuance mal odor or crepitus noted.                   Laboratory:  A1C:   Recent Labs   Lab 02/28/23  0549   HGBA1C 8.2*     Blood Cultures:   Recent Labs   Lab 04/20/23 2057   LABBLOO No Growth to date  No Growth to date     CBC:   Recent Labs   Lab 04/21/23  0500   WBC 11.42   RBC 2.92*   HGB 7.4*   HCT 24.7*   *   MCV 85   MCH 25.3*   MCHC 30.0*     CMP:   Recent Labs   Lab 04/21/23  0500   *   CALCIUM 9.7   ALBUMIN 2.1*   PROT 7.4   *   K 3.3*   CO2 19*      BUN 56*   CREATININE 1.8*   ALKPHOS 80   ALT 6*   AST 17   BILITOT 0.4     Coagulation: No results for input(s): PT, INR, APTT in the last 168 hours.  CRP: No results for input(s): CRP in the last 168 hours.  ESR: No results for input(s): SEDRATE in the last 168 hours.  Prealbumin: No results for input(s): PREALBUMIN in the last 48 hours.  Wound Cultures:   Recent Labs   Lab 03/04/23  1141 03/10/23  1340 03/31/23  0805 03/31/23  0809   LABAERO No growth PSEUDOMONAS FLUORESCENS GROUP  Few  * FELI ALBICANS  Rare  * FELI GLABRATA  Rare  *     Microbiology Results (last 7 days)       Procedure Component Value Units Date/Time    Blood culture x two cultures. Draw prior to antibiotics. [[id number]] Collected: 04/20/23 2057    Order Status: Completed Specimen: Blood from Peripheral, Upper Arm, Right Updated: 04/21/23 0545     Blood Culture, Routine No Growth to date    Narrative:      Aerobic and anaerobic    Blood culture x two cultures. Draw prior to antibiotics. [[id number]] Collected: 04/20/23 2057    Order Status: Completed Specimen: Blood from  Peripheral, Upper Arm, Right Updated: 04/21/23 0545     Blood Culture, Routine No Growth to date    Narrative:      Aerobic and anaerobic          Specimen (24h ago, onward)      None              Assessment/Plan:     Orthopedic  S/P transmetatarsal amputation of foot, right  Patient has no clinical signs of foot infection noted on physical exam. Worsening discoloration noted  to right tma site and left forefoot. Monophasic waveforms seen on left lower extremity arterial ultrasound   -Antibiotic plan per ID. Feet are not clinically infected.   - ABIs pending   - B/l xrays pending  - Vascular surgery following appreciate recs for wound healing potential  - Dressing applied by podiatry today  -Dressing changes by nursing.  -No WB restrictions from podiatry, WB in surgical shoes.   - Patient to keep dressing clean dry and intact  -Podiatry will follow        Thank you for your consult. I will follow-up with patient. Please contact us if you have any additional questions.    Danisha Hanley DPM PGY-1  Podiatric Medicine & Surgery  [facility]  Secure Chat Preferred  Mobile: [phone]  Pager: [phone]

## 2023-04-21 NOTE — HOSPITAL COURSE
Admitted to hospital medicine for LOLY and left foot pain. Cr on admission 2.1 (bl.8-1.5), improved with IVF initially. After holding IVF Cr increased, and then unresponsive to fluids. Patient with significant urine retention, rivas placed and Cr again increased. Consulted Nephrology for assistance, suspect due to acute drop in Hgb. LOLY improved 4/27. Nephrology recommended heme/onc evaluation for this patient regarding the three monoclonal bands seen on SPE. Spoke with heme/onc regarding case, labs ordered in anticipation of outpatient evaluation given that patient stable. Patient having increased soft bowel movements with melena 4/25. GI consulted performed EGD with small bowel follow through 4/26 revealed red blood in the gastric body with three bleeding angioectasias in the stomach treated with APC. DAPT held in setting of acute bleed. Discussed concern of rebleeding AVM possibly due to DAPT with vascular. ASA trial for 1 day after patient with 2-3 days without melena resulted in 5 dark tarry BM. ASA d/c and GI reconsulted after Hgb from 8 to 6.1 overnight 5/1. EGD with push enteroscopy 5/2 revealed single bleeding angioectasia in the duodenum, treated with argon plasma coagulation (APC). Continued PO PPI daily.     Vascular surgery consulted on admission, recommended repeat ARCHIE and left foot arterial doppler. Podiatry also saw the patient for left foot wound, recommended wound vac placement. Both Vascular and Podiatry felt no further intervention necessary at this time. Insurance did not cover wound vac, it was changed to regular dressings. Patient to have wound care with home health.     ID consulted, patient was receiving cefepime and micafungin outpatient per ID following R foot transmetatarsal amputation. Started on vanc, cefepime, and micafungin. Patient more lethargic 4/22 although able to follow commands and move extremities on exam. With concern for cefepime toxicity, ID removed cefepime and vancomycin.  Micafungin and zosyn continued. EEG and CTH ordered, no acute abnormalities. Patient significantly improved following d/c Cefepime to baseline. Patient to continue zosyn and micafungin until ID follow up 5/17 and then continue keflex ppx thereafter per ID.    SLP consulted recommended pureed dysphagia diet. PT/OT recommended SNF placement, but insurance did not approve. Medically stable to discharge home with home health 5/5/23.

## 2023-04-21 NOTE — ASSESSMENT & PLAN NOTE
A&O x3 on admission with waxing/waning per  at the bedside.  says she has been largely bed bound since discharge, but her waxing confusion was concerning to him.    - Monitor and coordinate with  at bedside to continue clarifying baseline

## 2023-04-21 NOTE — CONSULTS
[facility] Surg  Vascular Surgery  Consult Note    Inpatient consult to Vascular Surgery  Consult performed by: Freddy Murillo MD  Consult ordered by: Sheyla Jaramillo DO        Subjective:     Chief Complaint/Reason for Admission: peripheral arterial disease    History of Present Illness: Nelsy Marino is an 82yoF who is well known to the vascular surgery service. She has a past medical history significant for HTN, DM2, history of DVTs and peripheral arterial disease. She underwent a left to right fem to SFA bypass and right SFA angioplasty on 3/2/23. She was again admitted on 3/29/23 with concerns for left groin wound dehiscence. She was taken to the operating room at that time for debridement and found to have exposed graft at the debrided wound bed. She underwent a left rectus femoris flap at that time for coverage. She progressed well during that admission, maintained right lower extremity distal pulses, and continued to endorse right foot pain at that time.     She returns to the hospital with complaints of persistent left foot pain, skin discoloration. Vascular surgery consulted for evaluation.            Medications Prior to Admission   Medication Sig Dispense Refill Last Dose    amlodipine (NORVASC) 10 MG tablet Take 10 mg by mouth once daily.       aspirin (ECOTRIN) 81 MG EC tablet Take 81 mg by mouth once daily.       atorvastatin (LIPITOR) 40 MG tablet Take 1 tablet (40 mg total) by mouth every evening. 30 tablet 0     benzonatate (TESSALON) 100 MG capsule Take 1 capsule (100 mg total) by mouth 3 (three) times daily as needed for Cough. 20 capsule 0     BLOOD SUGAR DIAGNOSTIC (TRUE METRIX GLUCOSE TEST STRIP MISC) by Misc.(Non-Drug; Combo Route) route.       carvediloL (COREG) 12.5 MG tablet Take 12.5 mg by mouth 2 (two) times daily.       cephALEXin (KEFLEX) 500 MG capsule Take 2 capsules (1,000 mg total) by mouth every 12 (twelve) hours. 60 capsule 0     clopidogreL (PLAVIX) 75 mg tablet Take 1 tablet  "(75 mg total) by mouth once daily. 30 tablet 0     DULoxetine (CYMBALTA) 30 MG capsule Take 1 capsule (30 mg total) by mouth 2 (two) times daily. 60 capsule 0     furosemide (LASIX) 20 MG tablet Take 1 tablet (20 mg total) by mouth once daily. 30 tablet 0     insulin aspart protamine-insulin aspart (NOVOLOG MIX 70-30FLEXPEN U-100) 100 unit/mL (70-30) InPn pen Inject 12 Units into the skin 2 (two) times daily before meals.  0     insulin syringe-needle,dispos. 0.3 mL 30 gauge x 5/16" Syrg by Misc.(Non-Drug; Combo Route) route.       losartan-hydrochlorothiazide 100-25 mg (HYZAAR) 100-25 mg per tablet Take 1 tablet by mouth once daily.       metformin (GLUCOPHAGE) 1000 MG tablet Take 1,000 mg by mouth 2 (two) times daily with meals.       ondansetron (ZOFRAN) 8 MG tablet   0     oxyCODONE (ROXICODONE) 5 MG immediate release tablet Take 1 tablet (5 mg total) by mouth every 6 (six) hours as needed for Pain. 12 tablet 0     pantoprazole (PROTONIX) 40 MG tablet Take 1 tablet (40 mg total) by mouth 2 (two) times daily. 60 tablet 1     pen needle, diabetic 32 gauge x 5/32" Ndle Inject 1 each into the skin 4 (four) times daily. 100 each 0        Review of patient's allergies indicates:   Allergen Reactions    Motrin [ibuprofen] Itching       Past Medical History:   Diagnosis Date    CHF (congestive heart failure)     Diabetes mellitus     DVT (deep venous thrombosis)     Hypertension     Miscarriage      Past Surgical History:   Procedure Laterality Date     femoral vascular surgery       ANGIOGRAPHY OF LOWER EXTREMITY Right 2/23/2023    Procedure: Angiogram Extremity Unilateral;  Surgeon: Danny Dunbar MD;  Location: [facility] OR;  Service: Vascular;  Laterality: Right;  RN PHONE PREOP 2/20/2023----CK CONSENT    ANGIOGRAPHY OF LOWER EXTREMITY Right 4/4/2023    Procedure: Angiogram Extremity Unilateral - possible endovascular intervention;  Surgeon: René Simms MD;  Location: [facility] OR [address] [place] [address];  Service: Vascular;  " Laterality: Right;  19.6 mins.  740.81 mGy  73.3399 Gy.cm  58ml dye  local - 1ml    CREATION OF FEMORAL-FEMORAL ARTERY BYPASS WITH GRAFT Bilateral 3/2/2023    Procedure: CREATION, BYPASS, ARTERIAL, FEMORAL TO FEMORAL, USING GRAFT, LEFT FEMORAL ENDARTERECTOMY, LEFT ILIAC ARTERY STENT PLACEMENT, RIGHT LOWER EXTREMITY ANGIOGRAM AND RIGHT SFA  ANGIOPLASTY;  Surgeon: Danny Dunbar MD;  [facility];  Service: Vascular;  Laterality: Bilateral;   744.47 MGY  175.36 Gycm  Flouro time 20.5 mins      CREATION OF ILIOFEMORAL ARTERY BYPASS Right 3/20/2019    Procedure: CREATION, BYPASS, ARTERIAL, ILIAC TO FEMORAL, RIGHT LOWER EXTREMITY, RIGHT PROFUNDAPLASTY;  Surgeon: Danny Dunbar MD;  Location: [facility];  Service: Vascular;  Laterality: Right;  11:00AM START PER NAZANIN RICHARDS PREOP 3/13/2019  ACT'S, CELL SAVER, GRAFTS, BOOK WATER---NEED H/P  CONSENT IN AM------HGBA1C    CREATION OF MUSCLE ROTATIONAL FLAP Left 3/31/2023    Procedure: CREATION, FLAP, MUSCLE ROTATION;  Surgeon: Brandon Smiley MD;  [facility];  Service: Plastics;  Laterality: Left;    ESOPHAGOGASTRODUODENOSCOPY N/A 3/17/2023    Procedure: EGD (ESOPHAGOGASTRODUODENOSCOPY);  Surgeon: Gee Rodriguez MD;  Location: [facility] ([address] [place] [address]);  Service: Endoscopy;  Laterality: N/A;    ESOPHAGOGASTRODUODENOSCOPY N/A 4/11/2023    Procedure: EGD (ESOPHAGOGASTRODUODENOSCOPY);  Surgeon: Nazanin Kaiser MD;  [facility]  Service: Endoscopy;  Laterality: N/A;    FOOT AMPUTATION THROUGH METATARSAL Right 3/4/2023    Procedure: AMPUTATION, FOOT, TRANSMETATARSAL;  Surgeon: Benjamin Godfrey DPM;  [facility];  Service: Podiatry;  Laterality: Right;    HYSTERECTOMY      ?unsure cervix present    INCISION AND DRAINAGE Right 4/12/2019    Procedure: Incision and Drainage - R groin washout, possible muscle flap;  Surgeon: Danny Dunbar MD;  Location: [facility] OR [address] [place] [address];  Service: Vascular;   Laterality: Right;  groin washout    INCISION AND DRAINAGE OF GROIN Right 5/3/2019    Procedure: Incision and Drainage R groin;  Surgeon: Danny Dunbar MD;  Location: [facility] OR [address] [place] [address];  Service: Vascular;  Laterality: Right;    WOUND DEBRIDEMENT Left 3/31/2023    Procedure: DEBRIDEMENT, WOUND;  Surgeon: René Simms MD;  Location: [facility] OR [address] [place] [address];  Service: Vascular;  Laterality: Left;     Family History       Problem Relation (Age of Onset)    Diabetes Father    Hypertension Father          Tobacco Use    Smoking status: Former     Types: Cigarettes     Quit date:      Years since quittin.3    Smokeless tobacco: Never   Substance and Sexual Activity    Alcohol use: No    Drug use: No    Sexual activity: Never     Review of Systems   Constitutional:  Negative for activity change, appetite change, fatigue and fever.   HENT: Negative.     Eyes: Negative.    Respiratory:  Negative for cough, chest tightness and shortness of breath.    Cardiovascular:  Negative for chest pain and leg swelling.   Gastrointestinal:  Negative for abdominal distention and abdominal pain.   Skin:  Positive for color change and wound.   Objective:     Vital Signs (Most Recent):  Temp: 97.2 °F (36.2 °C) (23 07)  Pulse: 91 (23 07)  Resp: 18 (23 09)  BP: (!) 158/70 (23)  SpO2: 97 % (23)   Vital Signs (24h Range):  Temp:  [97.2 °F (36.2 °C)-98.4 °F (36.9 °C)] 97.2 °F (36.2 °C)  Pulse:  [76-98] 91  Resp:  [16-20] 18  SpO2:  [97 %-100 %] 97 %  BP: (110-215)/() 158/70     Weight: 68 kg (150 lb)  Body mass index is 24.96 kg/m².    Physical Exam  Vitals and nursing note reviewed.   Constitutional:       Appearance: She is ill-appearing.   HENT:      Nose: Nose normal.   Cardiovascular:      Rate and Rhythm: Normal rate and regular rhythm.      Comments: Biphasic doppler signals to left AT and PT pulses  Pulmonary:      Effort: Pulmonary effort is normal. No respiratory distress.    Abdominal:      General: Abdomen is flat. There is no distension.      Palpations: Abdomen is soft.   Skin:     Comments: Right TMA with ACE wrap in place  Left foot with skin discoloration to the forefoot, worst between the great and second toe   Neurological:      Mental Status: She is alert.       Significant Labs:  CBC:   Recent Labs   Lab 04/21/23  0500   WBC 11.42   RBC 2.92*   HGB 7.4*   HCT 24.7*   *   MCV 85   MCH 25.3*   MCHC 30.0*     CMP:   Recent Labs   Lab 04/21/23  0500   *   CALCIUM 9.7   ALBUMIN 2.1*   PROT 7.4   *   K 3.3*   CO2 19*      BUN 56*   CREATININE 1.8*   ALKPHOS 80   ALT 6*   AST 17   BILITOT 0.4       Significant Diagnostics:  I have reviewed all pertinent imaging results/findings within the past 24 hours.    Assessment/Plan:     * PVD (peripheral vascular disease)  Nelsy Marino is an 82yoF with known PAD s/p fem-fem and fem-SMA bypass in March of this year. This was complicated by wound dehiscence, exposed graft requiring a right rectus femoris flap. The patient returns to the hospital with complaints of persistent left foot pain, left foot discoloration. Vascular surgery consulted for evaluation.     - patient seen and examined  - labs and imaging reviewed   - arterial US reviewed  - biphasic doppler signals to left AT and PT   - recommend repeat ARCHIE and toe pressures  - recommend podiatry and wound care consult  - recommend pain management consult   - continue aspirin, statin, plavix  - PT/OT  - will follow up imaging results  - please call with questions          Thank you for your consult. I will follow-up with patient. Please contact us if you have any additional questions.    Freddy Murillo MD  Vascular Surgery  [facility] - Med Surg

## 2023-04-21 NOTE — H&P
[facility] Medicine  History & Physical    Patient Name: Nelsy Marino  MRN: [id number]  Patient Class: OP- Observation  Admission Date: 4/20/2023  Attending Physician: Kay Benavidez MD   Primary Care Provider: [facility]         Patient information was obtained from patient, spouse/SO, past medical records and ER records.     Subjective:     Principal Problem:<principal problem not specified>    Chief Complaint:   Chief Complaint   Patient presents with    Foot Injury     Pt coming from home with necrotic tissue on left foot that family states has been there for several weeks. Per EMS pt has diabetes and has had this problem before on her right foot where she had her right toes amputated.        HPI: This is an 82-year-old female with a past medical history of severe PAD, hypertension, type 2 diabetes, HFpEF (EF 65%), L femoral bypass and L leg flap, R toe ampuations 2 months prior with presenting after  noted waxing/waning confusion since her discharge from hospital two weeks ago. History largely provided by  at bedside as patient is intermittently somnolent. She was discharged on IV Cefepime and Micafungin for her R foot amputation and patient reports adherence with regimen at home. He grew concerned that int he past two weeks she had worsening LLE pain and darkened skin, which is what happened before her R toes were amputated. His niece contacted a nurse who recommneded ED evaluation. No fevers, chills, HA, chest pain, SOB, N/V/D, abdominal pain.    Pt receives  nurse aide, PT/OT since hospital DC and ID very recently made a referral to podiatry and wound care, but appointments not yet made.     In the ED patient was hemodynamically stable and hypertensive, intermittently on RA to 2L NC. Leukocytosis to 13, Hgb 7.5, Cr 2.1, Albumin 2.1, BCx in process, CT head and CXR wnl. Started on BS Abx with continued Cefepime, [place], started on Vanc pending Bcx, US LE arterial  with diminished flow throughout the left lower extremity. Monophasic waveforms suggesting atherosclerotic disease. Admitted to  for LOLY and Podiatry/Wound care.       Past Medical History:   Diagnosis Date    CHF (congestive heart failure)     Diabetes mellitus     DVT (deep venous thrombosis)     Hypertension     Miscarriage        Past Surgical History:   Procedure Laterality Date     femoral vascular surgery       ANGIOGRAPHY OF LOWER EXTREMITY Right 2/23/2023    Procedure: Angiogram Extremity Unilateral;  Surgeon: Danny Dunbar MD;  Location: [facility];  Service: Vascular;  Laterality: Right;  RN PHONE PREOP 2/20/2023----CK CONSENT    ANGIOGRAPHY OF LOWER EXTREMITY Right 4/4/2023    Procedure: Angiogram Extremity Unilateral - possible endovascular intervention;  Surgeon: René Simms MD;  Location: [facility] OR [address] [place] [address];  Service: Vascular;  Laterality: Right;  19.6 mins.  740.81 mGy  73.3399 Gy.cm  58ml dye  local - 1ml    CREATION OF FEMORAL-FEMORAL ARTERY BYPASS WITH GRAFT Bilateral 3/2/2023    Procedure: CREATION, BYPASS, ARTERIAL, FEMORAL TO FEMORAL, USING GRAFT, LEFT FEMORAL ENDARTERECTOMY, LEFT ILIAC ARTERY STENT PLACEMENT, RIGHT LOWER EXTREMITY ANGIOGRAM AND RIGHT SFA  ANGIOPLASTY;  Surgeon: Danny Dunbar MD;  Location: [facility] OR [address] [place] [address];  Service: Vascular;  Laterality: Bilateral;   744.47 MGY  175.36 Gycm  Flouro time 20.5 mins      CREATION OF ILIOFEMORAL ARTERY BYPASS Right 3/20/2019    Procedure: CREATION, BYPASS, ARTERIAL, ILIAC TO FEMORAL, RIGHT LOWER EXTREMITY, RIGHT PROFUNDAPLASTY;  Surgeon: Danny Dunbar MD;  Location: [facility];  Service: Vascular;  Laterality: Right;  11:00AM START PER ANNE RICHARDS PREOP 3/13/2019  ACT'S, CELL SAVER, GRAFTS, BOOK WATER---NEED H/P  CONSENT IN AM------HGBA1C    CREATION OF MUSCLE ROTATIONAL FLAP Left 3/31/2023    Procedure: CREATION, FLAP, MUSCLE ROTATION;  Surgeon: Brandon Smiley MD;  Location: [facility] OR [address] [place] [address];  Service:  Plastics;  Laterality: Left;    ESOPHAGOGASTRODUODENOSCOPY N/A 3/17/2023    Procedure: EGD (ESOPHAGOGASTRODUODENOSCOPY);  Surgeon: Gee Rodriguez MD;  Location: [facility] ENDO (2ND FLR);  Service: Endoscopy;  Laterality: N/A;    ESOPHAGOGASTRODUODENOSCOPY N/A 4/11/2023    Procedure: EGD (ESOPHAGOGASTRODUODENOSCOPY);  Surgeon: Nazanin Kaiser MD;  Location: [facility] ENDO (2ND FLR);  Service: Endoscopy;  Laterality: N/A;    FOOT AMPUTATION THROUGH METATARSAL Right 3/4/2023    Procedure: AMPUTATION, FOOT, TRANSMETATARSAL;  Surgeon: Benjamin Godfrey DPM;  [facility];  Service: Podiatry;  Laterality: Right;    HYSTERECTOMY      ?unsure cervix present    INCISION AND DRAINAGE Right 4/12/2019    Procedure: Incision and Drainage - R groin washout, possible muscle flap;  Surgeon: Danny Dunbar MD;  [facility];  Service: Vascular;  Laterality: Right;  groin washout    INCISION AND DRAINAGE OF GROIN Right 5/3/2019    Procedure: Incision and Drainage R groin;  Surgeon: Danny Dunbar MD;  [facility];  Service: Vascular;  Laterality: Right;    WOUND DEBRIDEMENT Left 3/31/2023    Procedure: DEBRIDEMENT, WOUND;  Surgeon: René Simms MD;  [facility];  Service: Vascular;  Laterality: Left;       Review of patient's allergies indicates:   Allergen Reactions    Motrin [ibuprofen] Itching       No current facility-administered medications on file prior to encounter.     Current Outpatient Medications on File Prior to Encounter   Medication Sig    amlodipine (NORVASC) 10 MG tablet Take 10 mg by mouth once daily.    aspirin (ECOTRIN) 81 MG EC tablet Take 81 mg by mouth once daily.    atorvastatin (LIPITOR) 40 MG tablet Take 1 tablet (40 mg total) by mouth every evening.    benzonatate (TESSALON) 100 MG capsule Take 1 capsule (100 mg total) by mouth 3 (three) times daily as needed for Cough.    BLOOD SUGAR DIAGNOSTIC (TRUE METRIX GLUCOSE TEST STRIP  "MISC) by Misc.(Non-Drug; Combo Route) route.    carvediloL (COREG) 12.5 MG tablet Take 12.5 mg by mouth 2 (two) times daily.    cephALEXin (KEFLEX) 500 MG capsule Take 2 capsules (1,000 mg total) by mouth every 12 (twelve) hours.    clopidogreL (PLAVIX) 75 mg tablet Take 1 tablet (75 mg total) by mouth once daily.    DULoxetine (CYMBALTA) 30 MG capsule Take 1 capsule (30 mg total) by mouth 2 (two) times daily.    furosemide (LASIX) 20 MG tablet Take 1 tablet (20 mg total) by mouth once daily.    insulin aspart protamine-insulin aspart (NOVOLOG MIX 70-30FLEXPEN U-100) 100 unit/mL (70-30) InPn pen Inject 12 Units into the skin 2 (two) times daily before meals.    insulin syringe-needle,dispos. 0.3 mL 30 gauge x 5/16" Syrg by Misc.(Non-Drug; Combo Route) route.    losartan-hydrochlorothiazide 100-25 mg (HYZAAR) 100-25 mg per tablet Take 1 tablet by mouth once daily.    metformin (GLUCOPHAGE) 1000 MG tablet Take 1,000 mg by mouth 2 (two) times daily with meals.    ondansetron (ZOFRAN) 8 MG tablet     oxyCODONE (ROXICODONE) 5 MG immediate release tablet Take 1 tablet (5 mg total) by mouth every 6 (six) hours as needed for Pain.    pantoprazole (PROTONIX) 40 MG tablet Take 1 tablet (40 mg total) by mouth 2 (two) times daily.    pen needle, diabetic 32 gauge x 5/32" Ndle Inject 1 each into the skin 4 (four) times daily.     Family History       Problem Relation (Age of Onset)    Diabetes Father    Hypertension Father          Tobacco Use    Smoking status: Former     Types: Cigarettes     Quit date:      Years since quittin.3    Smokeless tobacco: Never   Substance and Sexual Activity    Alcohol use: No    Drug use: No    Sexual activity: Never     Review of Systems   Constitutional:  Negative for activity change, appetite change and fever.   HENT: Negative.     Eyes: Negative.    Respiratory:  Negative for cough, shortness of breath and wheezing.    Cardiovascular:  Negative for chest pain and " leg swelling.   Gastrointestinal:  Negative for abdominal pain, constipation, diarrhea, nausea and vomiting.   Endocrine: Negative.    Genitourinary: Negative.    Musculoskeletal:  Positive for arthralgias and myalgias.   Skin:  Positive for color change and wound. Negative for rash.   Allergic/Immunologic: Negative.    Neurological:  Negative for headaches.   Hematological: Negative.    Psychiatric/Behavioral:  Positive for confusion.    Objective:     Vital Signs (Most Recent):  Temp: 97.6 °F (36.4 °C) (04/21/23 0309)  Pulse: 92 (04/21/23 0313)  Resp: 18 (04/21/23 0341)  BP: (!) 158/73 (04/21/23 0309)  SpO2: 99 % (04/21/23 0309)   Vital Signs (24h Range):  Temp:  [97.6 °F (36.4 °C)-98.4 °F (36.9 °C)] 97.6 °F (36.4 °C)  Pulse:  [76-98] 92  Resp:  [17-20] 18  SpO2:  [98 %-100 %] 99 %  BP: (110-215)/() 158/73     Weight: 68 kg (150 lb)  Body mass index is 25.75 kg/m².    Physical Exam  Vitals and nursing note reviewed.   Constitutional:       Appearance: She is ill-appearing.   HENT:      Head: Normocephalic and atraumatic.      Mouth/Throat:      Mouth: Mucous membranes are dry.      Pharynx: Oropharynx is clear.   Eyes:      Extraocular Movements: Extraocular movements intact.      Conjunctiva/sclera: Conjunctivae normal.   Cardiovascular:      Rate and Rhythm: Regular rhythm. Tachycardia present.      Pulses: Normal pulses.   Pulmonary:      Effort: Pulmonary effort is normal. No respiratory distress.      Breath sounds: No wheezing or rales.   Abdominal:      Palpations: Abdomen is soft.      Tenderness: There is no abdominal tenderness. There is no guarding.   Musculoskeletal:      Cervical back: Normal range of motion and neck supple.      Right lower leg: No edema.      Left lower leg: No edema.   Skin:     General: Skin is warm.      Findings: Lesion (R heel, L dorsum with darkened skin) present.   Neurological:      General: No focal deficit present.      Mental Status: She is disoriented.       Comments: Oriented to person, place, situation, not time             Significant Labs: All pertinent labs within the past 24 hours have been reviewed.    Significant Imaging: I have reviewed all pertinent imaging results/findings within the past 24 hours.    Assessment/Plan:     * PVD (peripheral vascular disease)  Recently discharged home with IV Cefepime 2g Q12 hours + IV Micafungin 100mg Q24 hours for estimated end of therapy date of 5/16/2023. For R toe amputation. Patient's  reports adherence with IV Abx. L foot with darkening skin changes, which alarmed  as this was how skin presented prior to toe amputations of R foot. RLE warm to touch with L US Lower Extremity Arteries demonstrating diminished flow throughout the left lower extremity. Monophasic waveforms suggesting atherosclerotic disease.     - R heel with skin and ulcer change, patient was due to f/u with Podiatry outpatient  - Podiatry consulted with appreciated recs for foot wound management  - Appreciated evaluation of L foot  - Continue ASA, atorvastatin, Plavix  - Continue Cefepime, Micafungin per prior ID recs. Starting Vanc pending BCx results  - Given US findings will not consult Vascular at this time    Debility  - Fall precautions  - Established with HH PT/OT/nurse aide, will forego re-consulting PT/OT at this time, do not expect SNF/LTAC placement      Skin ulcer of right heel, limited to breakdown of skin  See PVD for plan    - Wound care and podiatry consulted with appreciated mgmt recs      LOLY (acute kidney injury)  Patient with acute kidney injury likely due to IVVD/dehydration LOLY is currently worsening. Labs reviewed- Renal function/electrolytes with Estimated Creatinine Clearance: 19.6 mL/min (A) (based on SCr of 2.1 mg/dL (H)). according to latest data. Monitor urine output and serial BMP and adjust therapy as needed. Avoid nephrotoxins and renally dose meds for GFR listed above.     Cr 2.1 (baseline 0.8-1.5).  Patient  dry on physical exam.  Poor oral intake, most likely prerenal in origin.    Recent Labs   Lab 04/20/23 2059      K 3.7      CO2 19*   BUN 59*   CREATININE 2.1*       Plan:    - CMP qd, trend Cr  - strict I/O  - daily weights  - s/p 1 L IVF in ED  - renally dose all medications  - avoid nephrotoxic agents, NSAIDs, IV contrast, ACE/ARB  - If LOLY doesn't improve consider US retroperitoneal      Acute on chronic anemia  Patient with Hgb 7.5 on admission with baseline 8.6-11. Suspected 2/2 foot wounds and JAMES based on prior studies    - iron studies  - CBC qd, trend  - transfuse pRBC for Hb < 7 g/dL; transfuse for < 8 if ACS or active bleeding and unresponsive to volume resuscitation with evidence of end-organ ischemia/>20% blood volume loss with HD instablity/>30% blood volume loss      Acute encephalopathy  A&O x3 on admission with waxing/waning per  at the bedside.  says she has been largely bed bound since discharge, but her waxing confusion was concerning to him.    - Monitor and coordinate with  at bedside to continue clarifying baseline      S/P transmetatarsal amputation of foot, right  Appreciated evaluation by Podiatry      Diastolic CHF, chronic  Patient is identified as having Diastolic (HFpEF) heart failure that is Chronic. CHF is currently controlled. Latest ECHO performed and demonstrates- Results for orders placed during the hospital encounter of 02/27/23    Echo    Interpretation Summary  · The estimated ejection fraction is 65%.  · The left ventricle is normal in size with normal systolic function.  · Grade II left ventricular diastolic dysfunction.  · Normal right ventricular size with normal right ventricular systolic function.  · Mild left atrial enlargement.  · Mild-to-moderate mitral regurgitation.  · There is pulmonary hypertension.  · The estimated PA systolic pressure is 55 mmHg.  · Intermediate central venous pressure (8 mmHg).  . Continue Beta Blocker and  Furosemide and monitor clinical status closely. Monitor on telemetry. Patient is off CHF pathway.  Monitor strict Is&Os and daily weights.  Place on fluid restriction of 1.5 L. Continue to stress to patient importance of self efficacy and  on diet for CHF. Last BNP reviewed- and noted below No results for input(s): BNP, BNPTRIAGEBLO in the last 168 hours..    - Holding home Lasix in setting of LOLY  - Holding Hyzaar (HCTZ) in setting of LOLY  - Patient breathing comfortably on RA, no signs of volume overload      Type 2 diabetes mellitus with chronic kidney disease, with long-term current use of insulin  Patient's FSGs are uncontrolled due to hyperglycemia on current medication regimen.  Last A1c reviewed-   Lab Results   Component Value Date    HGBA1C 8.2 (H) 02/28/2023     Most recent fingerstick glucose reviewed- No results for input(s): POCTGLUCOSE in the last 24 hours.  Current correctional scale  Low  Maintain anti-hyperglycemic dose as follows-   Antihyperglycemics (From admission, onward)    Start     Stop Route Frequency Ordered    04/21/23 0355  insulin aspart U-100 pen 0-5 Units         -- SubQ Before meals & nightly PRN 04/21/23 0255        Hold Oral hypoglycemics while patient is in the hospital.    Essential hypertension  - Continue home Coreg, amlodipine  - hold Hyzaar in setting of LOLY      VTE Risk Mitigation (From admission, onward)         Ordered     heparin (porcine) injection 5,000 Units  Every 8 hours         04/21/23 0254     IP VTE HIGH RISK PATIENT  Once         04/21/23 0254     Place sequential compression device  Until discontinued         04/21/23 0254                       On 04/21/2023, patient should be placed in hospital observation services under my care in collaboration with Dr Benavidez.    Scott Cash MD  Department of Hospital Medicine  [facility] S[facility]

## 2023-04-21 NOTE — PROGRESS NOTES
[facility] Quick Note:     Patient observed to be readmitted on the evening of 4/20/2023 under observation status.     Patient is a current IV ATB patient of [facility]; prior to admit, patient's home IV ATB order was IV Micafungin 100mg Q24 + IV Cefepime 2g Q12.       Patient to be followed by [facility] staff while admitted for any needs.        Please do not hesitate to reach out for any additional needs.    Ricki Dickinson MS, RDN, LDN  Clinical Liaison & Dietitian  [facility]   Phone: [phone]  [email or web address]

## 2023-04-21 NOTE — HPI
82-year-old female with a past medical history of severe PAD, hypertension, type 2 diabetes, HFpEF, L femoral bypass and L leg flap, R toe ampuations 2 months prior with presenting after  noted waxing/waning confusion since her discharge from hospital two weeks ago. Podiatry was consulted for bilateral foot wounds and discoloration. History primarily given per family. Per family patient is usually a lot more alert, but since her recent hospital stay, has been confused, and more easily irritated. Per family says they have noticed worsening discoloration to her left foot, and have  noticed she is very sensitive to to touching her feet b/l. Family denies any fever chills nausea or vomiting. Family has no other pedal complaints at this time.

## 2023-04-22 PROBLEM — T14.8XXA WOUND INFECTION: Status: ACTIVE | Noted: 2023-04-22

## 2023-04-22 PROBLEM — L08.9 WOUND INFECTION: Status: ACTIVE | Noted: 2023-04-22

## 2023-04-22 PROBLEM — I77.6 VASCULAR INFLAMMATION OR INFECTION: Status: ACTIVE | Noted: 2023-04-22

## 2023-04-22 LAB
ABO + RH BLD: ABNORMAL
ALBUMIN SERPL BCP-MCNC: 1.8 G/DL (ref 3.5–5.2)
ALP SERPL-CCNC: 69 U/L (ref 55–135)
ALT SERPL W/O P-5'-P-CCNC: 6 U/L (ref 10–44)
AMMONIA PLAS-SCNC: 27 UMOL/L (ref 10–50)
ANION GAP SERPL CALC-SCNC: 10 MMOL/L (ref 8–16)
AST SERPL-CCNC: 17 U/L (ref 10–40)
BASOPHILS # BLD AUTO: 0.02 K/UL (ref 0–0.2)
BASOPHILS # BLD AUTO: 0.02 K/UL (ref 0–0.2)
BASOPHILS NFR BLD: 0.2 % (ref 0–1.9)
BASOPHILS NFR BLD: 0.2 % (ref 0–1.9)
BILIRUB SERPL-MCNC: 0.3 MG/DL (ref 0.1–1)
BLD GP AB SCN CELLS X3 SERPL QL: ABNORMAL
BLOOD GROUP ANTIBODIES SERPL: NORMAL
BUN SERPL-MCNC: 49 MG/DL (ref 8–23)
CALCIUM SERPL-MCNC: 9 MG/DL (ref 8.7–10.5)
CHLORIDE SERPL-SCNC: 107 MMOL/L (ref 95–110)
CO2 SERPL-SCNC: 20 MMOL/L (ref 23–29)
CREAT SERPL-MCNC: 1.6 MG/DL (ref 0.5–1.4)
DAT IGG-SP REAG RBC-IMP: NORMAL
DIFFERENTIAL METHOD: ABNORMAL
DIFFERENTIAL METHOD: ABNORMAL
EOSINOPHIL # BLD AUTO: 0.6 K/UL (ref 0–0.5)
EOSINOPHIL # BLD AUTO: 0.7 K/UL (ref 0–0.5)
EOSINOPHIL NFR BLD: 6.2 % (ref 0–8)
EOSINOPHIL NFR BLD: 7.4 % (ref 0–8)
ERYTHROCYTE [DISTWIDTH] IN BLOOD BY AUTOMATED COUNT: 15 % (ref 11.5–14.5)
ERYTHROCYTE [DISTWIDTH] IN BLOOD BY AUTOMATED COUNT: 15.1 % (ref 11.5–14.5)
EST. GFR  (NO RACE VARIABLE): 32 ML/MIN/1.73 M^2
FOLATE SERPL-MCNC: 5.5 NG/ML (ref 4–24)
GLUCOSE SERPL-MCNC: 120 MG/DL (ref 70–110)
HCT VFR BLD AUTO: 21.6 % (ref 37–48.5)
HCT VFR BLD AUTO: 22 % (ref 37–48.5)
HGB BLD-MCNC: 6.6 G/DL (ref 12–16)
HGB BLD-MCNC: 6.6 G/DL (ref 12–16)
IMM GRANULOCYTES # BLD AUTO: 0.03 K/UL (ref 0–0.04)
IMM GRANULOCYTES # BLD AUTO: 0.03 K/UL (ref 0–0.04)
IMM GRANULOCYTES NFR BLD AUTO: 0.3 % (ref 0–0.5)
IMM GRANULOCYTES NFR BLD AUTO: 0.3 % (ref 0–0.5)
LYMPHOCYTES # BLD AUTO: 1.5 K/UL (ref 1–4.8)
LYMPHOCYTES # BLD AUTO: 1.5 K/UL (ref 1–4.8)
LYMPHOCYTES NFR BLD: 14.2 % (ref 18–48)
LYMPHOCYTES NFR BLD: 16.6 % (ref 18–48)
MAGNESIUM SERPL-MCNC: 1.7 MG/DL (ref 1.6–2.6)
MCH RBC QN AUTO: 24.8 PG (ref 27–31)
MCH RBC QN AUTO: 25.6 PG (ref 27–31)
MCHC RBC AUTO-ENTMCNC: 30 G/DL (ref 32–36)
MCHC RBC AUTO-ENTMCNC: 30.6 G/DL (ref 32–36)
MCV RBC AUTO: 83 FL (ref 82–98)
MCV RBC AUTO: 84 FL (ref 82–98)
MONOCYTES # BLD AUTO: 0.5 K/UL (ref 0.3–1)
MONOCYTES # BLD AUTO: 0.6 K/UL (ref 0.3–1)
MONOCYTES NFR BLD: 5.3 % (ref 4–15)
MONOCYTES NFR BLD: 5.5 % (ref 4–15)
NEUTROPHILS # BLD AUTO: 6.4 K/UL (ref 1.8–7.7)
NEUTROPHILS # BLD AUTO: 7.6 K/UL (ref 1.8–7.7)
NEUTROPHILS NFR BLD: 70.2 % (ref 38–73)
NEUTROPHILS NFR BLD: 73.6 % (ref 38–73)
NRBC BLD-RTO: 0 /100 WBC
NRBC BLD-RTO: 0 /100 WBC
PHOSPHATE SERPL-MCNC: 3.4 MG/DL (ref 2.7–4.5)
PLATELET # BLD AUTO: 377 K/UL (ref 150–450)
PLATELET # BLD AUTO: 398 K/UL (ref 150–450)
PMV BLD AUTO: 10.1 FL (ref 9.2–12.9)
PMV BLD AUTO: 10.3 FL (ref 9.2–12.9)
POCT GLUCOSE: 129 MG/DL (ref 70–110)
POCT GLUCOSE: 135 MG/DL (ref 70–110)
POCT GLUCOSE: 142 MG/DL (ref 70–110)
POTASSIUM SERPL-SCNC: 3.7 MMOL/L (ref 3.5–5.1)
PROT SERPL-MCNC: 6.7 G/DL (ref 6–8.4)
RBC # BLD AUTO: 2.58 M/UL (ref 4–5.4)
RBC # BLD AUTO: 2.66 M/UL (ref 4–5.4)
SODIUM SERPL-SCNC: 137 MMOL/L (ref 136–145)
SPECIMEN OUTDATE: ABNORMAL
TSH SERPL DL<=0.005 MIU/L-ACNC: 1.3 UIU/ML (ref 0.4–4)
VANCOMYCIN SERPL-MCNC: 14.9 UG/ML
VIT B12 SERPL-MCNC: 868 PG/ML (ref 210–950)
WBC # BLD AUTO: 10.34 K/UL (ref 3.9–12.7)
WBC # BLD AUTO: 9.16 K/UL (ref 3.9–12.7)

## 2023-04-22 PROCEDURE — 84446 ASSAY OF VITAMIN E: CPT

## 2023-04-22 PROCEDURE — 36415 COLL VENOUS BLD VENIPUNCTURE: CPT

## 2023-04-22 PROCEDURE — 82607 VITAMIN B-12: CPT

## 2023-04-22 PROCEDURE — 99223 1ST HOSP IP/OBS HIGH 75: CPT | Mod: ,,, | Performed by: INTERNAL MEDICINE

## 2023-04-22 PROCEDURE — 84100 ASSAY OF PHOSPHORUS: CPT

## 2023-04-22 PROCEDURE — 25000003 PHARM REV CODE 250: Performed by: STUDENT IN AN ORGANIZED HEALTH CARE EDUCATION/TRAINING PROGRAM

## 2023-04-22 PROCEDURE — 99233 PR SUBSEQUENT HOSPITAL CARE,LEVL III: ICD-10-PCS | Mod: ,,, | Performed by: INTERNAL MEDICINE

## 2023-04-22 PROCEDURE — 86880 COOMBS TEST DIRECT: CPT | Performed by: INTERNAL MEDICINE

## 2023-04-22 PROCEDURE — 82140 ASSAY OF AMMONIA: CPT | Performed by: INTERNAL MEDICINE

## 2023-04-22 PROCEDURE — 21400001 HC TELEMETRY ROOM

## 2023-04-22 PROCEDURE — 85025 COMPLETE CBC W/AUTO DIFF WBC: CPT

## 2023-04-22 PROCEDURE — 86922 COMPATIBILITY TEST ANTIGLOB: CPT

## 2023-04-22 PROCEDURE — 96372 THER/PROPH/DIAG INJ SC/IM: CPT

## 2023-04-22 PROCEDURE — 80053 COMPREHEN METABOLIC PANEL: CPT

## 2023-04-22 PROCEDURE — 84425 ASSAY OF VITAMIN B-1: CPT

## 2023-04-22 PROCEDURE — 25000003 PHARM REV CODE 250

## 2023-04-22 PROCEDURE — 99233 SBSQ HOSP IP/OBS HIGH 50: CPT | Mod: ,,, | Performed by: INTERNAL MEDICINE

## 2023-04-22 PROCEDURE — 82746 ASSAY OF FOLIC ACID SERUM: CPT

## 2023-04-22 PROCEDURE — 80202 ASSAY OF VANCOMYCIN: CPT | Performed by: INTERNAL MEDICINE

## 2023-04-22 PROCEDURE — 63600175 PHARM REV CODE 636 W HCPCS

## 2023-04-22 PROCEDURE — 86592 SYPHILIS TEST NON-TREP QUAL: CPT

## 2023-04-22 PROCEDURE — 92610 EVALUATE SWALLOWING FUNCTION: CPT

## 2023-04-22 PROCEDURE — 99223 PR INITIAL HOSPITAL CARE,LEVL III: ICD-10-PCS | Mod: ,,, | Performed by: INTERNAL MEDICINE

## 2023-04-22 PROCEDURE — 86870 RBC ANTIBODY IDENTIFICATION: CPT | Performed by: INTERNAL MEDICINE

## 2023-04-22 PROCEDURE — 83735 ASSAY OF MAGNESIUM: CPT

## 2023-04-22 PROCEDURE — 97535 SELF CARE MNGMENT TRAINING: CPT

## 2023-04-22 PROCEDURE — 86900 BLOOD TYPING SEROLOGIC ABO: CPT | Performed by: INTERNAL MEDICINE

## 2023-04-22 PROCEDURE — 86902 BLOOD TYPE ANTIGEN DONOR EA: CPT | Performed by: INTERNAL MEDICINE

## 2023-04-22 PROCEDURE — 85025 COMPLETE CBC W/AUTO DIFF WBC: CPT | Mod: 91

## 2023-04-22 PROCEDURE — 86860 RBC ANTIBODY ELUTION: CPT | Performed by: INTERNAL MEDICINE

## 2023-04-22 PROCEDURE — 63600175 PHARM REV CODE 636 W HCPCS: Performed by: INTERNAL MEDICINE

## 2023-04-22 PROCEDURE — 25000003 PHARM REV CODE 250: Performed by: INTERNAL MEDICINE

## 2023-04-22 PROCEDURE — 84443 ASSAY THYROID STIM HORMONE: CPT

## 2023-04-22 RX ORDER — HYDROCODONE BITARTRATE AND ACETAMINOPHEN 500; 5 MG/1; MG/1
TABLET ORAL
Status: DISCONTINUED | OUTPATIENT
Start: 2023-04-22 | End: 2023-04-24

## 2023-04-22 RX ADMIN — HEPARIN SODIUM 5000 UNITS: 5000 INJECTION INTRAVENOUS; SUBCUTANEOUS at 05:04

## 2023-04-22 RX ADMIN — CEFEPIME 2 G: 2 INJECTION, POWDER, FOR SOLUTION INTRAVENOUS at 05:04

## 2023-04-22 RX ADMIN — PANTOPRAZOLE SODIUM 40 MG: 40 TABLET, DELAYED RELEASE ORAL at 01:04

## 2023-04-22 RX ADMIN — MICAFUNGIN SODIUM 100 MG: 100 INJECTION, POWDER, LYOPHILIZED, FOR SOLUTION INTRAVENOUS at 05:04

## 2023-04-22 RX ADMIN — HEPARIN SODIUM 5000 UNITS: 5000 INJECTION INTRAVENOUS; SUBCUTANEOUS at 02:04

## 2023-04-22 RX ADMIN — ATORVASTATIN CALCIUM 40 MG: 40 TABLET, FILM COATED ORAL at 08:04

## 2023-04-22 RX ADMIN — AMLODIPINE BESYLATE 10 MG: 10 TABLET ORAL at 02:04

## 2023-04-22 RX ADMIN — OXYCODONE HYDROCHLORIDE 10 MG: 10 TABLET ORAL at 05:04

## 2023-04-22 RX ADMIN — CARVEDILOL 12.5 MG: 12.5 TABLET, FILM COATED ORAL at 08:04

## 2023-04-22 RX ADMIN — ASPIRIN 81 MG: 81 TABLET, COATED ORAL at 09:04

## 2023-04-22 RX ADMIN — DULOXETINE 30 MG: 30 CAPSULE, DELAYED RELEASE ORAL at 08:04

## 2023-04-22 RX ADMIN — HEPARIN SODIUM 5000 UNITS: 5000 INJECTION INTRAVENOUS; SUBCUTANEOUS at 08:04

## 2023-04-22 RX ADMIN — OXYCODONE HYDROCHLORIDE 10 MG: 10 TABLET ORAL at 02:04

## 2023-04-22 RX ADMIN — PIPERACILLIN SODIUM AND TAZOBACTAM SODIUM 4.5 G: 4; .5 INJECTION, POWDER, FOR SOLUTION INTRAVENOUS at 11:04

## 2023-04-22 RX ADMIN — PIPERACILLIN SODIUM AND TAZOBACTAM SODIUM 4.5 G: 4; .5 INJECTION, POWDER, FOR SOLUTION INTRAVENOUS at 02:04

## 2023-04-22 RX ADMIN — CLOPIDOGREL BISULFATE 75 MG: 75 TABLET ORAL at 01:04

## 2023-04-22 RX ADMIN — PANTOPRAZOLE SODIUM 40 MG: 40 TABLET, DELAYED RELEASE ORAL at 08:04

## 2023-04-22 NOTE — CONSULTS
"[facility]  Adult Nutrition  Consult Note    SUMMARY     Recommendations  1. Texture per SLP- add diabetic/cardiac restrictions   2. Add Boost GC for optimization of protein and calorie intake   3. RD following    Goals: Meet % of EEN/EPN by RD f/u date  Nutrition Goal Status: goal not met  Communication of RD Recs: POC    Assessment and Plan    Nutrition Problem  Inadequate energy intake    Related to (etiology):   Physiological needs     Signs and Symptoms (as evidenced by):   0-25% meal consumption     Interventions (treatment strategy):  Collaboration of nutrition care w/ other providers    Nutrition Diagnosis Status:   New     Reason for Assessment    Reason For Assessment: consult  Diagnosis: other (see comments)  Relevant Medical History: DM, HTN  Interdisciplinary Rounds: attended  General Information Comments: RD consulted for poor intake. Speech in room during time of visit. Pt falling asleep w/ food in mouth. Pt has difficulty chewing/swallowing. Discussed addition of Boost- RD will add. Per chart review, weight fluctuations noted. Unable to complete NFPE during time of visit and assess for malnutrition at RD f/u. LBM noted-4/17/23  Nutrition Discharge Planning: pending clinical course        Nutrition Risk Screen    Nutrition Risk Screen: difficulty chewing/swallowing, large or nonhealing wound, burn or pressure injury, reduced oral intake over the last month    Nutrition/Diet History    Food Allergies: NKFA    Anthropometrics    Temp: 97.8 °F (36.6 °C)  Height Method: Stated  Height: 5' 5" (165.1 cm)  Height (inches): 65 in  Weight Method: Stated  Weight: 68 kg (150 lb)  Weight (lb): 150 lb  Ideal Body Weight (IBW), Female: 125 lb  BMI Grade: 18.5-24.9 - normal       Lab/Procedures/Meds    Pertinent Labs Reviewed: reviewed  Pertinent Labs Comments: BUN 49, Cr 1.6, GFR 32, Glucose 120  Pertinent Medications Reviewed: reviewed  Pertinent Medications Comments: " pantoprazole    Estimated/Assessed Needs    Weight Used For Calorie Calculations: 68 kg (149 lb 14.6 oz)  Energy Calorie Requirements (kcal): 1700  Energy Need Method: Kcal/kg (25 kcal/kg)  Protein Requirements: 81 g (1.2 g/kg)  Weight Used For Protein Calculations: 68 kg (149 lb 14.6 oz)        RDA Method (mL): 1700         Nutrition Prescription Ordered    Current Diet Order: Diabetic    Evaluation of Received Nutrient/Fluid Intake    I/O: -  Energy Calories Required: not meeting needs  Protein Required: not meeting needs  Fluid Required: not meeting needs  Total Fluid Intake (mL/kg): 1 ml or fluid per MD  Tolerance: tolerating  % Intake of Estimated Energy Needs: 0 - 25 %  % Meal Intake: 0 - 25 %    Nutrition Risk    Level of Risk/Frequency of Follow-up: low ((1x/week))       Monitor and Evaluation    Food and Nutrient Intake: energy intake, food and beverage intake  Food and Nutrient Adminstration: diet order  Knowledge/Beliefs/Attitudes: beliefs and attitudes, food and nutrition knowledge/skill  Physical Activity and Function: nutrition-related ADLs and IADLs, factors affecting access to physical activity  Anthropometric Measurements: height/length, weight, weight change, body mass index, growth pattern indices/percentile ranks  Biochemical Data, Medical Tests and Procedures: electrolyte and renal panel, gastrointestinal profile, glucose/endocrine profile, inflammatory profile, lipid profile  Nutrition-Focused Physical Findings: extremities, muscles and bones, overall appearance, skin, head and eyes       Nutrition Follow-Up    RD Follow-up?: Yes

## 2023-04-22 NOTE — ASSESSMENT & PLAN NOTE
Patient's FSGs are uncontrolled due to hyperglycemia on current medication regimen.  Last A1c reviewed-   Lab Results   Component Value Date    HGBA1C 8.2 (H) 02/28/2023     Most recent fingerstick glucose reviewed-   Recent Labs   Lab 04/21/23 2031 04/22/23  0745 04/22/23  1121 04/22/23  1559   POCTGLUCOSE 167* 142* 129* 135*     Current correctional scale  Low  Maintain anti-hyperglycemic dose as follows-   Antihyperglycemics (From admission, onward)    Start     Stop Route Frequency Ordered    04/21/23 0355  insulin aspart U-100 pen 0-5 Units         -- SubQ Before meals & nightly PRN 04/21/23 0255        Hold Oral hypoglycemics while patient is in the hospital.

## 2023-04-22 NOTE — SUBJECTIVE & OBJECTIVE
Past Medical History:   Diagnosis Date    CHF (congestive heart failure)     Diabetes mellitus     DVT (deep venous thrombosis)     Hypertension     Miscarriage        Past Surgical History:   Procedure Laterality Date     femoral vascular surgery       ANGIOGRAPHY OF LOWER EXTREMITY Right 2/23/2023    Procedure: Angiogram Extremity Unilateral;  Surgeon: Danny Dunbar MD;  Location: [facility];  Service: Vascular;  Laterality: Right;  RN PHONE PREOP 2/20/2023----CK CONSENT    ANGIOGRAPHY OF LOWER EXTREMITY Right 4/4/2023    Procedure: Angiogram Extremity Unilateral - possible endovascular intervention;  Surgeon: René Simms MD;  Location: [facility] OR 2ND FLR;  Service: Vascular;  Laterality: Right;  19.6 mins.  740.81 mGy  73.3399 Gy.cm  58ml dye  local - 1ml    CREATION OF FEMORAL-FEMORAL ARTERY BYPASS WITH GRAFT Bilateral 3/2/2023    Procedure: CREATION, BYPASS, ARTERIAL, FEMORAL TO FEMORAL, USING GRAFT, LEFT FEMORAL ENDARTERECTOMY, LEFT ILIAC ARTERY STENT PLACEMENT, RIGHT LOWER EXTREMITY ANGIOGRAM AND RIGHT SFA  ANGIOPLASTY;  Surgeon: Danyn Dunbar MD;  [facility];  Service: Vascular;  Laterality: Bilateral;   744.47 MGY  175.36 Gycm  Flouro time 20.5 mins      CREATION OF ILIOFEMORAL ARTERY BYPASS Right 3/20/2019    Procedure: CREATION, BYPASS, ARTERIAL, ILIAC TO FEMORAL, RIGHT LOWER EXTREMITY, RIGHT PROFUNDAPLASTY;  Surgeon: Danny Dunbar MD;  Location: [facility];  Service: Vascular;  Laterality: Right;  11:00AM START PER ANNE  RN PREOP 3/13/2019  ACT'S, CELL SAVER, GRAFTS, BOOK WATER---NEED H/P  CONSENT IN AM------HGBA1C    CREATION OF MUSCLE ROTATIONAL FLAP Left 3/31/2023    Procedure: CREATION, FLAP, MUSCLE ROTATION;  Surgeon: Brandon Smiley MD;  Location: [facility] OR 2ND FLR;  Service: Plastics;  Laterality: Left;    ESOPHAGOGASTRODUODENOSCOPY N/A 3/17/2023    Procedure: EGD (ESOPHAGOGASTRODUODENOSCOPY);  Surgeon: Gee Rodriguez MD;  Location: [facility] ENDO (2ND FLR);   Service: Endoscopy;  Laterality: N/A;    ESOPHAGOGASTRODUODENOSCOPY N/A 2023    Procedure: EGD (ESOPHAGOGASTRODUODENOSCOPY);  Surgeon: Nazanin Kaiser MD;  Location: [facility] (2ND FLR);  Service: Endoscopy;  Laterality: N/A;    FOOT AMPUTATION THROUGH METATARSAL Right 3/4/2023    Procedure: AMPUTATION, FOOT, TRANSMETATARSAL;  Surgeon: Benjamin Godfrey DPM;  [facility];  Service: Podiatry;  Laterality: Right;    HYSTERECTOMY      ?unsure cervix present    INCISION AND DRAINAGE Right 2019    Procedure: Incision and Drainage - R groin washout, possible muscle flap;  Surgeon: Danny Dunbar MD;  [facility];  Service: Vascular;  Laterality: Right;  groin washout    INCISION AND DRAINAGE OF GROIN Right 5/3/2019    Procedure: Incision and Drainage R groin;  Surgeon: Danny Dunbar MD;  [facility];  Service: Vascular;  Laterality: Right;    WOUND DEBRIDEMENT Left 3/31/2023    Procedure: DEBRIDEMENT, WOUND;  Surgeon: René Simms MD;  [facility];  Service: Vascular;  Laterality: Left;       Review of patient's allergies indicates:   Allergen Reactions    Motrin [ibuprofen] Itching       Medications:  Medications Prior to Admission   Medication Sig    amlodipine (NORVASC) 10 MG tablet Take 10 mg by mouth once daily.    aspirin (ECOTRIN) 81 MG EC tablet Take 81 mg by mouth once daily.    atorvastatin (LIPITOR) 40 MG tablet Take 1 tablet (40 mg total) by mouth every evening.    [] benzonatate (TESSALON) 100 MG capsule Take 1 capsule (100 mg total) by mouth 3 (three) times daily as needed for Cough.    BLOOD SUGAR DIAGNOSTIC (TRUE METRIX GLUCOSE TEST STRIP MISC) by Misc.(Non-Drug; Combo Route) route.    carvediloL (COREG) 12.5 MG tablet Take 12.5 mg by mouth 2 (two) times daily.    cephALEXin (KEFLEX) 500 MG capsule Take 2 capsules (1,000 mg total) by mouth every 12 (twelve) hours.    clopidogreL (PLAVIX) 75 mg tablet Take 1 tablet  "(75 mg total) by mouth once daily.    DULoxetine (CYMBALTA) 30 MG capsule Take 1 capsule (30 mg total) by mouth 2 (two) times daily.    furosemide (LASIX) 20 MG tablet Take 1 tablet (20 mg total) by mouth once daily.    insulin aspart protamine-insulin aspart (NOVOLOG MIX 70-30FLEXPEN U-100) 100 unit/mL (70-30) InPn pen Inject 12 Units into the skin 2 (two) times daily before meals.    insulin syringe-needle,dispos. 0.3 mL 30 gauge x 5/16" Syrg by Misc.(Non-Drug; Combo Route) route.    losartan-hydrochlorothiazide 100-25 mg (HYZAAR) 100-25 mg per tablet Take 1 tablet by mouth once daily.    metformin (GLUCOPHAGE) 1000 MG tablet Take 1,000 mg by mouth 2 (two) times daily with meals.    ondansetron (ZOFRAN) 8 MG tablet     oxyCODONE (ROXICODONE) 5 MG immediate release tablet Take 1 tablet (5 mg total) by mouth every 6 (six) hours as needed for Pain.    pantoprazole (PROTONIX) 40 MG tablet Take 1 tablet (40 mg total) by mouth 2 (two) times daily.    pen needle, diabetic 32 gauge x 5/32" Ndle Inject 1 each into the skin 4 (four) times daily.     Antibiotics (From admission, onward)      Start     Stop Route Frequency Ordered    04/21/23 0625  vancomycin - pharmacy to dose  (vancomycin IVPB)        See Hyperspace for full Linked Orders Report.    -- IV pharmacy to manage frequency 04/21/23 0525    04/21/23 0445  ceFEPIme (MAXIPIME) 2 g in dextrose 5 % in water (D5W) 5 % 50 mL IVPB (MB+)         -- IV Every 24 hours (non-standard times) 04/21/23 0338          Antifungals (From admission, onward)      Start     Stop Route Frequency Ordered    04/21/23 0445  micafungin 100 mg in sodium chloride 0.9 % 100 mL IVPB (MB+)         -- IV Every 24 hours (non-standard times) 04/21/23 0338          Antivirals (From admission, onward)      None               There is no immunization history on file for this patient.    Family History       Problem Relation (Age of Onset)    Diabetes Father    Hypertension Father          Social " History     Socioeconomic History    Marital status:    Tobacco Use    Smoking status: Former     Types: Cigarettes     Quit date:      Years since quittin.3    Smokeless tobacco: Never   Substance and Sexual Activity    Alcohol use: No    Drug use: No    Sexual activity: Never     Social Determinants of Health     Financial Resource Strain: Unknown    Difficulty of Paying Living Expenses: Patient refused   Food Insecurity: No Food Insecurity    Worried About Running Out of Food in the Last Year: Never true    Ran Out of Food in the Last Year: Never true   Transportation Needs: No Transportation Needs    Lack of Transportation (Medical): No    Lack of Transportation (Non-Medical): No   Physical Activity: Inactive    Days of Exercise per Week: 0 days    Minutes of Exercise per Session: 0 min   Stress: Unknown    Feeling of Stress : Patient refused   Social Connections: Unknown    Frequency of Communication with Friends and Family: More than three times a week    Frequency of Social Gatherings with Friends and Family: More than three times a week    Attends [employer] Services: Patient refused    Active Member of Clubs or Organizations: Patient refused    Attends Club or Organization Meetings: Patient refused    Marital Status:    Housing Stability: Low Risk     Unable to Pay for Housing in the Last Year: No    Number of Places Lived in the Last Year: 1    Unstable Housing in the Last Year: No     Review of Systems   Unable to perform ROS: Mental status change   Objective:     Vital Signs (Most Recent):  Temp: 97.8 °F (36.6 °C) (23)  Pulse: 79 (23)  Resp: 18 (23)  BP: (!) 141/64 (23)  SpO2: 98 % (23)   Vital Signs (24h Range):  Temp:  [97.1 °F (36.2 °C)-97.8 °F (36.6 °C)] 97.8 °F (36.6 °C)  Pulse:  [75-90] 79  Resp:  [12-20] 18  SpO2:  [94 %-98 %] 98 %  BP: (127-151)/(59-71) 141/64     Weight: 68 kg (150 lb)  Body mass index is 24.96  kg/m².    Estimated Creatinine Clearance: 21.7 mL/min (A) (based on SCr of 1.8 mg/dL (H)).    Physical Exam  Vitals and nursing note reviewed.   Constitutional:       Appearance: She is well-developed.   HENT:      Head: Normocephalic and atraumatic.      Right Ear: External ear normal.      Left Ear: External ear normal.   Eyes:      General: No scleral icterus.        Right eye: No discharge.         Left eye: No discharge.      Conjunctiva/sclera: Conjunctivae normal.   Cardiovascular:      Rate and Rhythm: Normal rate and regular rhythm.      Heart sounds: Normal heart sounds. No murmur heard.    No friction rub. No gallop.   Pulmonary:      Effort: Pulmonary effort is normal. No respiratory distress.      Breath sounds: Normal breath sounds. No stridor. No wheezing or rales.   Abdominal:      General: Bowel sounds are normal.   Musculoskeletal:         General: No tenderness. Normal range of motion.   Skin:     General: Skin is warm and dry.      Comments: Dark skin discoloration over the dorsal aspect of the left foot. Right foot covered with compression dressing.   Neurological:      Mental Status: She is lethargic.      Comments: Does not answer questions. Follows commands intermittently.       Significant Labs: Blood Culture:   Recent Labs   Lab 03/08/23 2325 03/10/23  0942 03/10/23  0943 03/29/23 2009 04/20/23 2057   LABBLOO No growth after 5 days.  No growth after 5 days. No growth after 5 days. No growth after 5 days. No growth after 5 days.  No growth after 5 days. No Growth to date  No Growth to date  No Growth to date  No Growth to date     BMP:   Recent Labs   Lab 04/21/23  0500   *   *   K 3.3*      CO2 19*   BUN 56*   CREATININE 1.8*   CALCIUM 9.7   MG 1.9     CBC:   Recent Labs   Lab 04/20/23 2059 04/21/23  0500   WBC 13.22* 11.42   HGB 7.5* 7.4*   HCT 24.1* 24.7*   * 526*     Urine Culture: No results for input(s): LABURIN in the last 4320 hours.  Urine Studies:    Recent Labs   Lab 03/09/23  1427   COLORU Yellow   APPEARANCEUA Clear   PHUR 5.0   SPECGRAV 1.015   PROTEINUA Negative   GLUCUA Negative   KETONESU Negative   BILIRUBINUA Negative   OCCULTUA Negative   NITRITE Negative   LEUKOCYTESUR Negative     Wound Culture:   Recent Labs   Lab 03/04/23  1141 03/10/23  1340 03/31/23  0805 03/31/23  0809   LABAERO No growth PSEUDOMONAS FLUORESCENS GROUP  Few  * FELI ALBICANS  Rare  * FELI GLABRATA  Rare  *       Significant Imaging: I have reviewed all pertinent imaging results/findings within the past 24 hours.   8

## 2023-04-22 NOTE — PT/OT/SLP PROGRESS
"Occupational Therapy      Patient Name:  Nelsy Marino   MRN:  [id number]    Patient not seen today secondary to pt somnolent upon attempt.  Nursing and therapist provided verbal and tactile stimuli.  She opened her eyes and smiled but would immediately close her eyes.  Pt was disoriented to person, place, time, and situation.  Her  was tearful and said, "This isn't my wife.  You won't get anything out of her."  Noted that pt was pocketing food and having difficulty swallowing.  Nursing was aware and SLP consulted.  OT will follow-up 4/23/2023.    4/22/2023  "

## 2023-04-22 NOTE — PROGRESS NOTES
[facility] Medicine  Progress Note    Patient Name: Nelsy Marino  MRN: [id number]  Patient Class: IP- Inpatient   Admission Date: 4/20/2023  Length of Stay: 0 days  Attending Physician: Kay Benavidez MD  Primary Care Provider: Juan F Garay        Subjective:     Principal Problem:PAD (peripheral artery disease)        HPI:  This is an 82-year-old female with a past medical history of severe PAD, hypertension, type 2 diabetes, HFpEF (EF 65%), L femoral bypass and L leg flap, R toe ampuations 2 months prior with presenting after  noted waxing/waning confusion since her discharge from hospital two weeks ago. History largely provided by  at bedside as patient is intermittently somnolent. She was discharged on IV Cefepime and Micafungin for her R foot amputation and patient reports adherence with regimen at home. He grew concerned that int he past two weeks she had worsening LLE pain and darkened skin, which is what happened before her R toes were amputated. His niece contacted a nurse who recommneded ED evaluation. No fevers, chills, HA, chest pain, SOB, N/V/D, abdominal pain.    Pt receives  nurse aide, PT/OT since hospital DC and ID very recently made a referral to podiatry and wound care, but appointments not yet made.     In the ED patient was hemodynamically stable and hypertensive, intermittently on RA to 2L NC. Leukocytosis to 13, Hgb 7.5, Cr 2.1, Albumin 2.1, BCx in process, CT head and CXR wnl. Started on BS Abx with continued Cefepime, [place], started on Vanc pending Bcx, US LE arterial with diminished flow throughout the left lower extremity. Monophasic waveforms suggesting atherosclerotic disease. Admitted to  for LOLY and Podiatry/Wound care.       Overview/Hospital Course:  Admitted to hospital medicine for LOLY and left foot pain. Cr on admission 2.1 (bl.8-1.5), improved with IVF. Vascular surgery consulted, recommended repeat ARCHIE and left foot arterial doppler.  ID consulted, patient was receiving cefepime and micafungin outpatient per ID following R foot transmetatarsal amputation. Started on vanc, cefepime, and micafungin. Podiatry and wound care consulted. Patient more lethargic 4/22 although able to follow commands and move extremities on exam. With concern for cefepime toxicity, ID removed cefepime and vancomycin. Micafungin and zosyn continued. EEG and CTH ordered. SLP consulted.      Interval History: Foot pain overnight received prn oxycodone. This morning patient was pocketing food, lethargic but able to follow commands, withdrawing to pain.  at bedside explains that she has deteriorated since she initially came to the hospital in February. Re examined patient in afternoon, improved although remained lethargic opening eyes when asked and closing shortly thereafter. Able to answer yes/no and move extremities on command.     Past Medical History:   Diagnosis Date    CHF (congestive heart failure)     Diabetes mellitus     DVT (deep venous thrombosis)     Hypertension     Miscarriage        Past Surgical History:   Procedure Laterality Date     femoral vascular surgery       ANGIOGRAPHY OF LOWER EXTREMITY Right 2/23/2023    Procedure: Angiogram Extremity Unilateral;  Surgeon: Danny Dunbar MD;  Location: [facility];  Service: Vascular;  Laterality: Right;  RN PHONE PREOP 2/20/2023----CK CONSENT    ANGIOGRAPHY OF LOWER EXTREMITY Right 4/4/2023    Procedure: Angiogram Extremity Unilateral - possible endovascular intervention;  Surgeon: René Simms MD;  Location: [facility] OR [address] [place] [address];  Service: Vascular;  Laterality: Right;  19.6 mins.  740.81 mGy  73.3399 Gy.cm  58ml dye  local - 1ml    CREATION OF FEMORAL-FEMORAL ARTERY BYPASS WITH GRAFT Bilateral 3/2/2023    Procedure: CREATION, BYPASS, ARTERIAL, FEMORAL TO FEMORAL, USING GRAFT, LEFT FEMORAL ENDARTERECTOMY, LEFT ILIAC ARTERY STENT PLACEMENT, RIGHT LOWER EXTREMITY ANGIOGRAM AND RIGHT SFA   ANGIOPLASTY;  Surgeon: Danny Dunbar MD;  Location: [address];  Service: Vascular;  Laterality: Bilateral;   744.47 MGY  175.36 Gycm  Flouro time 20.5 mins      CREATION OF ILIOFEMORAL ARTERY BYPASS Right 3/20/2019    Procedure: CREATION, BYPASS, ARTERIAL, ILIAC TO FEMORAL, RIGHT LOWER EXTREMITY, RIGHT PROFUNDAPLASTY;  Surgeon: Danny Dunbar MD;  Location: [facility];  Service: Vascular;  Laterality: Right;  11:00AM START PER NAZANIN RICHARDS PREOP 3/13/2019  ACT'S, CELL SAVER, GRAFTS, BOOK WATER---NEED H/P  CONSENT IN AM------HGBA1C    CREATION OF MUSCLE ROTATIONAL FLAP Left 3/31/2023    Procedure: CREATION, FLAP, MUSCLE ROTATION;  Surgeon: Brandon Smiley MD;  Location: [address];  Service: Plastics;  Laterality: Left;    ESOPHAGOGASTRODUODENOSCOPY N/A 3/17/2023    Procedure: EGD (ESOPHAGOGASTRODUODENOSCOPY);  Surgeon: Gee Rodriguez MD;  Location: [facility] ([address] [place] [address]);  Service: Endoscopy;  Laterality: N/A;    ESOPHAGOGASTRODUODENOSCOPY N/A 4/11/2023    Procedure: EGD (ESOPHAGOGASTRODUODENOSCOPY);  Surgeon: Nazanin Kaiser MD;  Location: [facility] ENDO ([address] [place] [address]);  Service: Endoscopy;  Laterality: N/A;    FOOT AMPUTATION THROUGH METATARSAL Right 3/4/2023    Procedure: AMPUTATION, FOOT, TRANSMETATARSAL;  Surgeon: Benjamin Godfrey DPM;  Location: [address];  Service: Podiatry;  Laterality: Right;    HYSTERECTOMY      ?unsure cervix present    INCISION AND DRAINAGE Right 4/12/2019    Procedure: Incision and Drainage - R groin washout, possible muscle flap;  Surgeon: Danny Dunbar MD;  Location: [address];  Service: Vascular;  Laterality: Right;  groin washout    INCISION AND DRAINAGE OF GROIN Right 5/3/2019    Procedure: Incision and Drainage R groin;  Surgeon: Danny Dunbar MD;  Location: [address];  Service: Vascular;  Laterality: Right;    WOUND DEBRIDEMENT Left 3/31/2023    Procedure: DEBRIDEMENT, WOUND;  Surgeon: René Simms MD;  Location:  "[facility] OR 2ND FLR;  Service: Vascular;  Laterality: Left;       Review of patient's allergies indicates:   Allergen Reactions    Motrin [ibuprofen] Itching       No current facility-administered medications on file prior to encounter.     Current Outpatient Medications on File Prior to Encounter   Medication Sig    amlodipine (NORVASC) 10 MG tablet Take 10 mg by mouth once daily.    aspirin (ECOTRIN) 81 MG EC tablet Take 81 mg by mouth once daily.    atorvastatin (LIPITOR) 40 MG tablet Take 1 tablet (40 mg total) by mouth every evening.    [] benzonatate (TESSALON) 100 MG capsule Take 1 capsule (100 mg total) by mouth 3 (three) times daily as needed for Cough.    BLOOD SUGAR DIAGNOSTIC (TRUE METRIX GLUCOSE TEST STRIP MISC) by Misc.(Non-Drug; Combo Route) route.    carvediloL (COREG) 12.5 MG tablet Take 12.5 mg by mouth 2 (two) times daily.    cephALEXin (KEFLEX) 500 MG capsule Take 2 capsules (1,000 mg total) by mouth every 12 (twelve) hours.    clopidogreL (PLAVIX) 75 mg tablet Take 1 tablet (75 mg total) by mouth once daily.    DULoxetine (CYMBALTA) 30 MG capsule Take 1 capsule (30 mg total) by mouth 2 (two) times daily.    furosemide (LASIX) 20 MG tablet Take 1 tablet (20 mg total) by mouth once daily.    insulin aspart protamine-insulin aspart (NOVOLOG MIX 70-30FLEXPEN U-100) 100 unit/mL (70-30) InPn pen Inject 12 Units into the skin 2 (two) times daily before meals.    insulin syringe-needle,dispos. 0.3 mL 30 gauge x 5/16" Syrg by Misc.(Non-Drug; Combo Route) route.    losartan-hydrochlorothiazide 100-25 mg (HYZAAR) 100-25 mg per tablet Take 1 tablet by mouth once daily.    metformin (GLUCOPHAGE) 1000 MG tablet Take 1,000 mg by mouth 2 (two) times daily with meals.    ondansetron (ZOFRAN) 8 MG tablet     oxyCODONE (ROXICODONE) 5 MG immediate release tablet Take 1 tablet (5 mg total) by mouth every 6 (six) hours as needed for Pain.    pantoprazole (PROTONIX) 40 MG tablet Take 1 tablet (40 " "mg total) by mouth 2 (two) times daily.    pen needle, diabetic 32 gauge x 5/32" Ndle Inject 1 each into the skin 4 (four) times daily.     Family History       Problem Relation (Age of Onset)    Diabetes Father    Hypertension Father          Tobacco Use    Smoking status: Former     Types: Cigarettes     Quit date:      Years since quittin.3    Smokeless tobacco: Never   Substance and Sexual Activity    Alcohol use: No    Drug use: No    Sexual activity: Never     Review of Systems   Constitutional:  Negative for activity change, appetite change and fever.   HENT: Negative.     Eyes: Negative.    Respiratory:  Negative for cough, shortness of breath and wheezing.    Cardiovascular:  Negative for chest pain and leg swelling.   Gastrointestinal:  Negative for abdominal pain, constipation, diarrhea, nausea and vomiting.   Endocrine: Negative.    Genitourinary: Negative.    Musculoskeletal:  Positive for arthralgias and myalgias.   Skin:  Positive for color change and wound. Negative for rash.   Allergic/Immunologic: Negative.    Neurological:  Negative for headaches.   Hematological: Negative.    Psychiatric/Behavioral:  Positive for confusion.    Objective:     Vital Signs (Most Recent):  Temp: 97.8 °F (36.6 °C) (23 0747)  Pulse: 91 (23 1457)  Resp: 18 (23 1421)  BP: (!) 144/63 (23 1100)  SpO2: 96 % (23 1100)   Vital Signs (24h Range):  Temp:  [97.6 °F (36.4 °C)-97.8 °F (36.6 °C)] 97.8 °F (36.6 °C)  Pulse:  [75-91] 91  Resp:  [12-20] 18  SpO2:  [96 %-98 %] 96 %  BP: (140-151)/(63-71) 144/63     Weight: 68 kg (150 lb)  Body mass index is 24.96 kg/m².    Physical Exam  Vitals and nursing note reviewed.   Constitutional:       General: She is not in acute distress.     Appearance: She is ill-appearing.   HENT:      Head: Normocephalic and atraumatic.      Mouth/Throat:      Mouth: Mucous membranes are dry.      Pharynx: Oropharynx is clear.   Eyes:      Extraocular " Movements: Extraocular movements intact.      Conjunctiva/sclera: Conjunctivae normal.   Cardiovascular:      Rate and Rhythm: Regular rhythm. Tachycardia present.      Pulses: Normal pulses.   Pulmonary:      Effort: Pulmonary effort is normal. No respiratory distress.      Breath sounds: No wheezing or rales.   Abdominal:      Palpations: Abdomen is soft.      Tenderness: There is no abdominal tenderness. There is no guarding.   Musculoskeletal:      Cervical back: Normal range of motion and neck supple.      Right lower leg: No edema.      Left lower leg: No edema.   Skin:     General: Skin is warm.      Findings: Lesion (R heel, L dorsum with darkened skin) present.   Neurological:      General: No focal deficit present.      Mental Status: She is disoriented.      Comments: Following commands  Able to respond yes/no          Significant Labs: All pertinent labs within the past 24 hours have been reviewed.    Significant Imaging: I have reviewed all pertinent imaging results/findings within the past 24 hours.      Assessment/Plan:      * PAD (peripheral artery disease)  Recently discharged home with IV Cefepime 2g Q12 hours + IV Micafungin 100mg Q24 hours for estimated end of therapy date of 5/16/2023. For R toe amputation. Patient's  reports adherence with IV Abx. L foot with darkening skin changes, which alarmed  as this was how skin presented prior to toe amputations of R foot. RLE warm to touch with L US Lower Extremity Arteries demonstrating diminished flow throughout the left lower extremity. Monophasic waveforms suggesting atherosclerotic disease.     - Podiatry and Vascular Surgery following; appreciate recs  - Continue ASA, atorvastatin, Plavix  - Continue Micafungin and Zosyn per prior ID recs    Debility    - Fall precautions  - Established with  PT/OT/nurse aide, will forego re-consulting PT/OT at this time, do not expect SNF/LTAC placement      Skin ulcer of right heel, limited to  breakdown of skin  See PVD for plan    - Wound care and podiatry consulted with prakash rocha      LOLY (acute kidney injury)  Patient with acute kidney injury likely due to IVVD/dehydration LOLY is currently worsening. Labs reviewed- Renal function/electrolytes with Estimated Creatinine Clearance: 24.4 mL/min (A) (based on SCr of 1.6 mg/dL (H)). according to latest data. Monitor urine output and serial BMP and adjust therapy as needed. Avoid nephrotoxins and renally dose meds for GFR listed above.     Cr 2.1 (baseline 0.8-1.5).  Patient dry on physical exam.  Poor oral intake, most likely prerenal in origin.    Recent Labs   Lab 04/20/23  2059 04/21/23  0500 04/22/23  0400    135* 137   K 3.7 3.3* 3.7    104 107   CO2 19* 19* 20*   BUN 59* 56* 49*   CREATININE 2.1* 1.8* 1.6*     Improved with fluids   - CMP qd, trend Cr  - strict I/O  - daily weights  - renally dose all medications  - avoid nephrotoxic agents, NSAIDs, IV contrast, ACE/ARB  - If LOLY doesn't improve consider US retroperitoneal      Acute on chronic anemia  Patient with Hgb 7.5 on admission with baseline 8.6-11. Suspected 2/2 foot wounds and JAMES based on prior studies    - CBC qd, trend  - transfuse pRBC for Hb < 7 g/dL; transfuse for < 8 if ACS or active bleeding and unresponsive to volume resuscitation with evidence of end-organ ischemia/>20% blood volume loss with HD instablity/>30% blood volume loss      Acute encephalopathy  A&O x3 on admission with waxing/waning per  at the bedside.  says she has been largely bed bound since discharge, but her waxing confusion was concerning to him. Patient became more lethargic few days into hospital stay,  at bedside says she has been this way off and on since first hospital admission in February.     - Repeat CT Head in setting of small Hgb drop overnight with increased lethargy   - EEG r/o seizure  - F/u TSH, B12, B1, Vit E, Ammonia, RPR  - Cefepime changed to Zosyn  -  F/u blood cultures      S/P transmetatarsal amputation of foot, right  Appreciated evaluation by Podiatry      Diastolic CHF, chronic  Patient is identified as having Diastolic (HFpEF) heart failure that is Chronic. CHF is currently controlled. Latest ECHO performed and demonstrates- Results for orders placed during the hospital encounter of 02/27/23    Echo    Interpretation Summary  · The estimated ejection fraction is 65%.  · The left ventricle is normal in size with normal systolic function.  · Grade II left ventricular diastolic dysfunction.  · Normal right ventricular size with normal right ventricular systolic function.  · Mild left atrial enlargement.  · Mild-to-moderate mitral regurgitation.  · There is pulmonary hypertension.  · The estimated PA systolic pressure is 55 mmHg.  · Intermediate central venous pressure (8 mmHg).  . Continue Beta Blocker and Furosemide and monitor clinical status closely. Monitor on telemetry. Patient is off CHF pathway.  Monitor strict Is&Os and daily weights.  Place on fluid restriction of 1.5 L. Continue to stress to patient importance of self efficacy and  on diet for CHF. Last BNP reviewed- and noted below No results for input(s): BNP, BNPTRIAGEBLO in the last 168 hours..    - Holding home Lasix in setting of LOLY  - Holding Hyzaar (HCTZ) in setting of LOLY  - Patient breathing comfortably on RA, no signs of volume overload      Type 2 diabetes mellitus with chronic kidney disease, with long-term current use of insulin  Patient's FSGs are uncontrolled due to hyperglycemia on current medication regimen.  Last A1c reviewed-   Lab Results   Component Value Date    HGBA1C 8.2 (H) 02/28/2023     Most recent fingerstick glucose reviewed-   Recent Labs   Lab 04/21/23  2031 04/22/23  0745 04/22/23  1121 04/22/23  1559   POCTGLUCOSE 167* 142* 129* 135*     Current correctional scale  Low  Maintain anti-hyperglycemic dose as follows-   Antihyperglycemics (From admission, onward)     Start     Stop Route Frequency Ordered    04/21/23 0355  insulin aspart U-100 pen 0-5 Units         -- SubQ Before meals & nightly PRN 04/21/23 0255        Hold Oral hypoglycemics while patient is in the hospital.    Essential hypertension  - Continue home Coreg, amlodipine  - hold Hyzaar in setting of LOLY        VTE Risk Mitigation (From admission, onward)         Ordered     heparin (porcine) injection 5,000 Units  Every 8 hours         04/21/23 0254     IP VTE HIGH RISK PATIENT  Once         04/21/23 0254     Place sequential compression device  Until discontinued         04/21/23 0254                Discharge Planning   JUAN ALBERTO: 4/24/2023     Code Status: Full Code   Is the patient medically ready for discharge?: No    Reason for patient still in hospital (select all that apply): Patient trending condition, Treatment, Imaging, Consult recommendations and Pending disposition  Discharge Plan A: Home Health (with home infusion)                  Boris Mckeon DO  Department of Hospital Medicine   [facility]

## 2023-04-22 NOTE — ASSESSMENT & PLAN NOTE
Recently discharged home with IV Cefepime 2g Q12 hours + IV Micafungin 100mg Q24 hours for estimated end of therapy date of 5/16/2023. For R toe amputation. Patient's  reports adherence with IV Abx. L foot with darkening skin changes, which alarmed  as this was how skin presented prior to toe amputations of R foot. RLE warm to touch with L US Lower Extremity Arteries demonstrating diminished flow throughout the left lower extremity. Monophasic waveforms suggesting atherosclerotic disease.     - Podiatry and Vascular Surgery following; appreciate recs  - Continue ASA, atorvastatin, Plavix  - Continue Micafungin and Zosyn per prior ID recs

## 2023-04-22 NOTE — CONSULTS
[facility]  Wound Care    Patient Name:  Nelsy Marino   MRN:  [id number]  Date: 2023  Diagnosis: PAD (peripheral artery disease)    History:     Past Medical History:   Diagnosis Date    CHF (congestive heart failure)     Diabetes mellitus     DVT (deep venous thrombosis)     Hypertension     Miscarriage        Social History     Socioeconomic History    Marital status:    Tobacco Use    Smoking status: Former     Types: Cigarettes     Quit date:      Years since quittin.3    Smokeless tobacco: Never   Substance and Sexual Activity    Alcohol use: No    Drug use: No    Sexual activity: Never     Social Determinants of Health     Financial Resource Strain: Unknown    Difficulty of Paying Living Expenses: Patient refused   Food Insecurity: No Food Insecurity    Worried About Running Out of Food in the Last Year: Never true    Ran Out of Food in the Last Year: Never true   Transportation Needs: No Transportation Needs    Lack of Transportation (Medical): No    Lack of Transportation (Non-Medical): No   Physical Activity: Inactive    Days of Exercise per Week: 0 days    Minutes of Exercise per Session: 0 min   Stress: Unknown    Feeling of Stress : Patient refused   Social Connections: Unknown    Frequency of Communication with Friends and Family: More than three times a week    Frequency of Social Gatherings with Friends and Family: More than three times a week    Attends Denominational Services: Patient refused    Active Member of Clubs or Organizations: Patient refused    Attends Club or Organization Meetings: Patient refused    Marital Status:    Housing Stability: Low Risk     Unable to Pay for Housing in the Last Year: No    Number of Places Lived in the Last Year: 1    Unstable Housing in the Last Year: No       Precautions:     Allergies as of 2023 - Reviewed 2023   Allergen Reaction Noted    Motrin [ibuprofen] Itching 2017       WOC Assessment Details/Treatment        Wound care consulted for bl lower extremity wounds. 83 yo Female admited for PAD with recent vascular sx in March 2023.  Complication of wound incision dehiscence with revision requiring muscle flap. Managed by outpatient services. While in room for assessment, noted that BL foot wounds being managed per podiatry. There was concern for wound to left groin.Recent attempt to evaluate per MD, but unsuccessful.  Request by patient and family to look at wound. Noted tunneling to 1pm  position on wound edge. SS drainage drained noted from tunnel when probed. Notified surgery team, Dr COSTA Murillo.  Requested new wound care orders for woun'dres to wound bed and cover with mepilex dressing q 2 days. Dr Murillo agreed to wound care.  Patient on waffle overlay mattress.  Jb Score 15.  Patient and family educated on waffle mattress and turn q 2 hr protocol with understanding.  Staff nurse to perform wound care.  Wound care to follow up as needed.     04/21/23 2012        Incision/Site 03/31/23 0956 Left Groin anterior   Date First Assessed/Time First Assessed: 03/31/23 0956   Present Prior to Hospital Arrival?: Yes  Side: Left  Location: Groin  Orientation: anterior   Wound Image     Incision WDL ex   Drainage Amount other (see comments)  (moderate amount of ss drainage emptyed out of tunnel on edge of wound at 2pm location.)   Appearance Red;Dry;Muscle;Other (see comments)  (muscle flap)   Periwound Area Dry;Intact   Wound Edges Other (see comments)  (rolled and macerated)   Wound Length (cm) 5.2 cm   Wound Width (cm) 6 cm   Wound Depth (cm) 0.3 cm   Wound Volume (cm^3) 9.36 cm^3   Wound Surface Area (cm^2) 31.2 cm^2   Care Cleansed with:;Antimicrobial agent;Other (see comments)  (vashe)   Dressing Applied;Collagen;Silicone;Island/border;Foam       Treatment:  wound gel to wound bed and secure with mepilex.  04/21/2023

## 2023-04-22 NOTE — SUBJECTIVE & OBJECTIVE
Interval History: Foot pain overnight received prn oxycodone. This morning patient was pocketing food, lethargic but able to follow commands, withdrawing to pain.  at bedside explains that she has deteriorated since she initially came to the hospital in February. Re examined patient in afternoon, improved although remained lethargic opening eyes when asked and closing shortly thereafter. Able to answer yes/no and move extremities on command.     Past Medical History:   Diagnosis Date    CHF (congestive heart failure)     Diabetes mellitus     DVT (deep venous thrombosis)     Hypertension     Miscarriage        Past Surgical History:   Procedure Laterality Date     femoral vascular surgery       ANGIOGRAPHY OF LOWER EXTREMITY Right 2/23/2023    Procedure: Angiogram Extremity Unilateral;  Surgeon: Danny Dunbar MD;  Location: [facility];  Service: Vascular;  Laterality: Right;  RN PHONE PREOP 2/20/2023----CK CONSENT    ANGIOGRAPHY OF LOWER EXTREMITY Right 4/4/2023    Procedure: Angiogram Extremity Unilateral - possible endovascular intervention;  Surgeon: René Simms MD;  Location: [address];  Service: Vascular;  Laterality: Right;  19.6 mins.  740.81 mGy  73.3399 Gy.cm  58ml dye  local - 1ml    CREATION OF FEMORAL-FEMORAL ARTERY BYPASS WITH GRAFT Bilateral 3/2/2023    Procedure: CREATION, BYPASS, ARTERIAL, FEMORAL TO FEMORAL, USING GRAFT, LEFT FEMORAL ENDARTERECTOMY, LEFT ILIAC ARTERY STENT PLACEMENT, RIGHT LOWER EXTREMITY ANGIOGRAM AND RIGHT SFA  ANGIOPLASTY;  Surgeon: Danny Dunbar MD;  Location: [address];  Service: Vascular;  Laterality: Bilateral;   744.47 MGY  175.36 Gycm  Flouro time 20.5 mins      CREATION OF ILIOFEMORAL ARTERY BYPASS Right 3/20/2019    Procedure: CREATION, BYPASS, ARTERIAL, ILIAC TO FEMORAL, RIGHT LOWER EXTREMITY, RIGHT PROFUNDAPLASTY;  Surgeon: Danny Dunbar MD;  Location: [facility];  Service: Vascular;  Laterality: Right;  11:00AM START PER  NAZANIN RICHARDS PREOP 3/13/2019  ACT'S, CELL SAVER, GRAFTS, BOOK WATER---NEED H/P  CONSENT IN AM------HGBA1C    CREATION OF MUSCLE ROTATIONAL FLAP Left 3/31/2023    Procedure: CREATION, FLAP, MUSCLE ROTATION;  Surgeon: Brandon Smiley MD;  Location: [address];  Service: Plastics;  Laterality: Left;    ESOPHAGOGASTRODUODENOSCOPY N/A 3/17/2023    Procedure: EGD (ESOPHAGOGASTRODUODENOSCOPY);  Surgeon: Gee Rodriguez MD;  [facility]  Service: Endoscopy;  Laterality: N/A;    ESOPHAGOGASTRODUODENOSCOPY N/A 4/11/2023    Procedure: EGD (ESOPHAGOGASTRODUODENOSCOPY);  Surgeon: Nazanin Kaiser MD;  Location: [facility] ([address] [place] [address]);  Service: Endoscopy;  Laterality: N/A;    FOOT AMPUTATION THROUGH METATARSAL Right 3/4/2023    Procedure: AMPUTATION, FOOT, TRANSMETATARSAL;  Surgeon: Benjamin Godfrey DPM;  Location: [address];  Service: Podiatry;  Laterality: Right;    HYSTERECTOMY      ?unsure cervix present    INCISION AND DRAINAGE Right 4/12/2019    Procedure: Incision and Drainage - R groin washout, possible muscle flap;  Surgeon: Danny Dunbar MD;  Location: [address];  Service: Vascular;  Laterality: Right;  groin washout    INCISION AND DRAINAGE OF GROIN Right 5/3/2019    Procedure: Incision and Drainage R groin;  Surgeon: Danny Dunbar MD;  Location: [address];  Service: Vascular;  Laterality: Right;    WOUND DEBRIDEMENT Left 3/31/2023    Procedure: DEBRIDEMENT, WOUND;  Surgeon: René Simms MD;  Location: [address];  Service: Vascular;  Laterality: Left;       Review of patient's allergies indicates:   Allergen Reactions    Motrin [ibuprofen] Itching       No current facility-administered medications on file prior to encounter.     Current Outpatient Medications on File Prior to Encounter   Medication Sig    amlodipine (NORVASC) 10 MG tablet Take 10 mg by mouth once daily.    aspirin (ECOTRIN) 81 MG EC tablet Take 81 mg by mouth once daily.     "atorvastatin (LIPITOR) 40 MG tablet Take 1 tablet (40 mg total) by mouth every evening.    [] benzonatate (TESSALON) 100 MG capsule Take 1 capsule (100 mg total) by mouth 3 (three) times daily as needed for Cough.    BLOOD SUGAR DIAGNOSTIC (TRUE METRIX GLUCOSE TEST STRIP MISC) by Misc.(Non-Drug; Combo Route) route.    carvediloL (COREG) 12.5 MG tablet Take 12.5 mg by mouth 2 (two) times daily.    cephALEXin (KEFLEX) 500 MG capsule Take 2 capsules (1,000 mg total) by mouth every 12 (twelve) hours.    clopidogreL (PLAVIX) 75 mg tablet Take 1 tablet (75 mg total) by mouth once daily.    DULoxetine (CYMBALTA) 30 MG capsule Take 1 capsule (30 mg total) by mouth 2 (two) times daily.    furosemide (LASIX) 20 MG tablet Take 1 tablet (20 mg total) by mouth once daily.    insulin aspart protamine-insulin aspart (NOVOLOG MIX 70-30FLEXPEN U-100) 100 unit/mL (70-30) InPn pen Inject 12 Units into the skin 2 (two) times daily before meals.    insulin syringe-needle,dispos. 0.3 mL 30 gauge x 5/16" Syrg by Misc.(Non-Drug; Combo Route) route.    losartan-hydrochlorothiazide 100-25 mg (HYZAAR) 100-25 mg per tablet Take 1 tablet by mouth once daily.    metformin (GLUCOPHAGE) 1000 MG tablet Take 1,000 mg by mouth 2 (two) times daily with meals.    ondansetron (ZOFRAN) 8 MG tablet     oxyCODONE (ROXICODONE) 5 MG immediate release tablet Take 1 tablet (5 mg total) by mouth every 6 (six) hours as needed for Pain.    pantoprazole (PROTONIX) 40 MG tablet Take 1 tablet (40 mg total) by mouth 2 (two) times daily.    pen needle, diabetic 32 gauge x 5/32" Ndle Inject 1 each into the skin 4 (four) times daily.     Family History       Problem Relation (Age of Onset)    Diabetes Father    Hypertension Father          Tobacco Use    Smoking status: Former     Types: Cigarettes     Quit date:      Years since quittin.3    Smokeless tobacco: Never   Substance and Sexual Activity    Alcohol use: No    Drug use: No    Sexual activity: " Never     Review of Systems   Constitutional:  Negative for activity change, appetite change and fever.   HENT: Negative.     Eyes: Negative.    Respiratory:  Negative for cough, shortness of breath and wheezing.    Cardiovascular:  Negative for chest pain and leg swelling.   Gastrointestinal:  Negative for abdominal pain, constipation, diarrhea, nausea and vomiting.   Endocrine: Negative.    Genitourinary: Negative.    Musculoskeletal:  Positive for arthralgias and myalgias.   Skin:  Positive for color change and wound. Negative for rash.   Allergic/Immunologic: Negative.    Neurological:  Negative for headaches.   Hematological: Negative.    Psychiatric/Behavioral:  Positive for confusion.    Objective:     Vital Signs (Most Recent):  Temp: 97.8 °F (36.6 °C) (04/22/23 0747)  Pulse: 91 (04/22/23 1457)  Resp: 18 (04/22/23 1421)  BP: (!) 144/63 (04/22/23 1100)  SpO2: 96 % (04/22/23 1100)   Vital Signs (24h Range):  Temp:  [97.6 °F (36.4 °C)-97.8 °F (36.6 °C)] 97.8 °F (36.6 °C)  Pulse:  [75-91] 91  Resp:  [12-20] 18  SpO2:  [96 %-98 %] 96 %  BP: (140-151)/(63-71) 144/63     Weight: 68 kg (150 lb)  Body mass index is 24.96 kg/m².    Physical Exam  Vitals and nursing note reviewed.   Constitutional:       General: She is not in acute distress.     Appearance: She is ill-appearing.   HENT:      Head: Normocephalic and atraumatic.      Mouth/Throat:      Mouth: Mucous membranes are dry.      Pharynx: Oropharynx is clear.   Eyes:      Extraocular Movements: Extraocular movements intact.      Conjunctiva/sclera: Conjunctivae normal.   Cardiovascular:      Rate and Rhythm: Regular rhythm. Tachycardia present.      Pulses: Normal pulses.   Pulmonary:      Effort: Pulmonary effort is normal. No respiratory distress.      Breath sounds: No wheezing or rales.   Abdominal:      Palpations: Abdomen is soft.      Tenderness: There is no abdominal tenderness. There is no guarding.   Musculoskeletal:      Cervical back: Normal range  of motion and neck supple.      Right lower leg: No edema.      Left lower leg: No edema.   Skin:     General: Skin is warm.      Findings: Lesion (R heel, L dorsum with darkened skin) present.   Neurological:      General: No focal deficit present.      Mental Status: She is disoriented.      Comments: Following commands  Able to respond yes/no          Significant Labs: All pertinent labs within the past 24 hours have been reviewed.    Significant Imaging: I have reviewed all pertinent imaging results/findings within the past 24 hours.

## 2023-04-22 NOTE — PROGRESS NOTES
VANCOMYCIN DOSING BY PHARMACY DISCONTINUATION NOTE    Nelsy Marino is a 82 y.o. female who had been consulted for vancomycin dosing.    The pharmacy consult for vancomycin dosing has been discontinued.     Vancomycin Dosing by Pharmacy Consult will sign-off. Please reconsult if necessary. Thank you for allowing us to participate in this patient's care.     Inna Oro, PharmD   Ext. [phone]

## 2023-04-22 NOTE — ASSESSMENT & PLAN NOTE
I have reviewed hospital notes from   service and other specialty providers. I have also reviewed CBC, CMP/BMP,  cultures and imaging with my interpretation as documented.     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 On outpatient cefepime and micafungin. Progressive encephalopathy noted which may be neurotoxicity from cefepime therapy.   Discontinue cefepime.   Past cultures reviewed therefore will start Zosyn every 8 hours (CrCl>20).   Continue micafungin.   Monitor CrCl for further Zosyn dosing optimization.   Discussed management plan with the staff and/or members from  service.

## 2023-04-22 NOTE — ASSESSMENT & PLAN NOTE
Patient with Hgb 7.5 on admission with baseline 8.6-11. Suspected 2/2 foot wounds and JAMES based on prior studies    - CBC qd, trend  - transfuse pRBC for Hb < 7 g/dL; transfuse for < 8 if ACS or active bleeding and unresponsive to volume resuscitation with evidence of end-organ ischemia/>20% blood volume loss with HD instablity/>30% blood volume loss

## 2023-04-22 NOTE — ASSESSMENT & PLAN NOTE
A&O x3 on admission with waxing/waning per  at the bedside.  says she has been largely bed bound since discharge, but her waxing confusion was concerning to him. Patient became more lethargic few days into hospital stay,  at bedside says she has been this way off and on since first hospital admission in February.     - Repeat CT Head in setting of small Hgb drop overnight with increased lethargy   - EEG r/o seizure  - F/u TSH, B12, B1, Vit E, Ammonia, RPR  - Cefepime changed to Zosyn  - F/u blood cultures

## 2023-04-22 NOTE — NURSING
All due morning medications were not given since the patient had difficulty swallowing it. Patient was lethargic and disoriented x 4. Patient unable to stay awake for a conversation. However she followed commands once asked to squeeze my hand. MD notified.

## 2023-04-22 NOTE — PT/OT/SLP EVAL
Speech Language Pathology Evaluation  Bedside Swallow    Patient Name:  Nelsy Marino   MRN:  [id number]  Admitting Diagnosis: PAD (peripheral artery disease)    Recommendations:                 General Recommendations:  Dysphagia therapy  Diet recommendations:  Puree Diet - IDDSI Level 4, Thin liquids - IDDSI Level 0   Aspiration Precautions: 1 bite/sip at a time, Alternating bites/sips, Assistance with meals, Feed only when awake/alert, and Meds whole buried in puree   General Precautions: Standard,    Communication strategies:  none    History:     Past Medical History:   Diagnosis Date    CHF (congestive heart failure)     Diabetes mellitus     DVT (deep venous thrombosis)     Hypertension     Miscarriage        Past Surgical History:   Procedure Laterality Date     femoral vascular surgery       ANGIOGRAPHY OF LOWER EXTREMITY Right 2/23/2023    Procedure: Angiogram Extremity Unilateral;  Surgeon: Danny Dunbar MD;  Location: [facility];  Service: Vascular;  Laterality: Right;  RN PHONE PREOP 2/20/2023----CK CONSENT    ANGIOGRAPHY OF LOWER EXTREMITY Right 4/4/2023    Procedure: Angiogram Extremity Unilateral - possible endovascular intervention;  Surgeon: René Simms MD;  Location: [facility] OR [address] [place] [address];  Service: Vascular;  Laterality: Right;  19.6 mins.  740.81 mGy  73.3399 Gy.cm  58ml dye  local - 1ml    CREATION OF FEMORAL-FEMORAL ARTERY BYPASS WITH GRAFT Bilateral 3/2/2023    Procedure: CREATION, BYPASS, ARTERIAL, FEMORAL TO FEMORAL, USING GRAFT, LEFT FEMORAL ENDARTERECTOMY, LEFT ILIAC ARTERY STENT PLACEMENT, RIGHT LOWER EXTREMITY ANGIOGRAM AND RIGHT SFA  ANGIOPLASTY;  Surgeon: Danny Dunbar MD;  Location: [facility] OR [address] [place] [address];  Service: Vascular;  Laterality: Bilateral;   744.47 MGY  175.36 Gycm  Flouro time 20.5 mins      CREATION OF ILIOFEMORAL ARTERY BYPASS Right 3/20/2019    Procedure: CREATION, BYPASS, ARTERIAL, ILIAC TO FEMORAL, RIGHT LOWER EXTREMITY, RIGHT PROFUNDAPLASTY;  Surgeon: Danny CAGE  MD Bettina;  Location: [facility];  Service: Vascular;  Laterality: Right;  11:00AM START PER NAZANIN RICHARDS PREOP 3/13/2019  ACT'S, CELL SAVER, GRAFTS, BOOK WATER---NEED H/P  CONSENT IN AM------HGBA1C    CREATION OF MUSCLE ROTATIONAL FLAP Left 3/31/2023    Procedure: CREATION, FLAP, MUSCLE ROTATION;  Surgeon: Brandon Smiley MD;  Location: [facility] OR [address] [place] [address];  Service: Plastics;  Laterality: Left;    ESOPHAGOGASTRODUODENOSCOPY N/A 3/17/2023    Procedure: EGD (ESOPHAGOGASTRODUODENOSCOPY);  Surgeon: Gee Rodriguez MD;  [facility]  Service: Endoscopy;  Laterality: N/A;    ESOPHAGOGASTRODUODENOSCOPY N/A 4/11/2023    Procedure: EGD (ESOPHAGOGASTRODUODENOSCOPY);  Surgeon: Nazanin Kaiser MD;  [facility]  Service: Endoscopy;  Laterality: N/A;    FOOT AMPUTATION THROUGH METATARSAL Right 3/4/2023    Procedure: AMPUTATION, FOOT, TRANSMETATARSAL;  Surgeon: Benjamin Godfrey DPM;  Location: [address];  Service: Podiatry;  Laterality: Right;    HYSTERECTOMY      ?unsure cervix present    INCISION AND DRAINAGE Right 4/12/2019    Procedure: Incision and Drainage - R groin washout, possible muscle flap;  Surgeon: Danny Dunbar MD;  Location: [address];  Service: Vascular;  Laterality: Right;  groin washout    INCISION AND DRAINAGE OF GROIN Right 5/3/2019    Procedure: Incision and Drainage R groin;  Surgeon: Danny Dunbar MD;  Location: [address];  Service: Vascular;  Laterality: Right;    WOUND DEBRIDEMENT Left 3/31/2023    Procedure: DEBRIDEMENT, WOUND;  Surgeon: René Simms MD;  Location: [address];  Service: Vascular;  Laterality: Left;        82 year old female with history of PAD complicated by L femoral bypass and L leg flap, R toe amputations 2/2023, HTN, DM2, HFpEF admitted for concern of necrotic left foot and progressive confusion. Of note patient admitted 3/29 to 4/11 (previously admitted 3/2 to 3/20) after left to right fem-SFA  bypass and right SFA angioplasty 3/2, ID consulted and recommended ciprofloxacin for pseudomonas SSTI as well as cephalexin for chronic suppression of graft infection. Patient followed up in clinic 3/29 and found to have wound dehiscence of her groin incision, taken to OR for wound debridement and found to have exposed graft at debrided wound bed. Plastic surgery consulted at that time and performed rectus femoris flap for graft coverage. Patient was started on apixaban but unable to tolerate due to GI bleeds. Patient ultimately discharged home with home health on micafungin and cefepime.  noted darkening of left foot skin as well as increased pain, concerned for necrosis and presented to the ED. Vascular surgery consulted, recommending repeat ARCHIE and toe pressures. Podiatry consulted.    Social History: Patient lives with spouse    Prior Intubation HX:  none this admission     Modified Barium Swallow: none documented prior     Chest X-Rays: 4/20/23 Impression:Interval resolution of bilateral infiltrates.  No acute findings.    Prior diet: regular unrestricted diet at baseline per discussion with pt nikym at the bedside. Niece further notes pt is a very selective eater at baseline.     Per discussion with RN pt has been lethargic and holding food in her mouth. Alertness level has been waxing and waning further impacting safety with PO intake. SLP advised family not to place dentures at this time as pt not awake and alert enough to wear safely.  Dietician also present at the bedside during assessment and SLP discussed offering nutritious supplemental beverages . Speech to continue to follow.     Subjective   Pt drowsy warranting frequently max stimulation to maintain alertness during assessment to participate in PO trials.     Pain/Comfort:  Pain Rating 1: 0/10  Pain Rating Post-Intervention 1: 0/10    Respiratory Status: Room air    Objective:     Oral Musculature Evaluation  Oral Musculature: unable to  assess due to poor participation/comprehension, general weakness, WFL  Dentition: edentulous (pt does have dentures aht the bedside and sister attempting to place them however pt lethargic and not participating to open mouth)  Volitional Cough: not observed  Volitional Swallow: unabel to elicit  Voice Prior to PO Intake: strong and clear    Bedside Swallow Eval:   Consistencies Assessed:  Thin liquids small single sips x5  Soft solids x4      Oral Phase:   Max cueing warranted to open mouth to accept which impacted participation with all PO trials and volumes were limited. Pt with intermittent slow chewing and falling returning to light sleep state with food in her mouth.      Pharyngeal Phase:   no overt clinical signs/symptoms of aspiration  no overt clinical signs/symptoms of pharyngeal dysphagia  Given lethargic state pt appearing largest contributing factor for safety concerns with PO intake    Compensatory Strategies  None    Treatment:  Education provided to Pt re: SLP role in acute care setting, overall impressions and therapeutic goals. Whiteboard updated.    Max cueing warranted to open mouth to accept which impacted participation with all PO trials and volumes were limited. Pt with intermittent slow cheiwng and falling returning to light sleep state with food in her mouth. She is capable of chewing and swallowing chopped soft solids when awake and alert. Given lethargic state she would best benefit from a pureed diet at this time  Concern remains alertness level. Discussed with family extensively only offering and not forcing PO intake when awake alert and requesting . Speech to continue to follow to advance diet as appropriate     Assessment:     Nelsy Marino is a 82 y.o. female with an SLP diagnosis of Dysphagia.    Goals:   Multidisciplinary Problems       SLP Goals          Problem: SLP    Goal Priority Disciplines Outcome   SLP Goal     SLP    Description: Speech Language Pathology Goals  Goals  expected to be met by 4/30    1. Pt will tolerate diet of thin liquids and minced moist solids without overt clinical signs of aspiration                          Plan:     Patient to be seen:  3 x/week   Plan of Care expires:     Plan of Care reviewed with:  patient, family (sister and neice)   SLP Follow-Up:  Yes       Discharge recommendations:  nursing facility, skilled   Barriers to Discharge:  None    Time Tracking:     SLP Treatment Date:   04/22/23  Speech Start Time:  1235  Speech Stop Time:  1252     Speech Total Time (min):  17 min    Billable Minutes: Eval Swallow and Oral Function 8 and Self Care/Home Management Training 9    04/22/2023

## 2023-04-22 NOTE — HPI
"An 82-year-old woman with HTN, DMII, DVT, CHF, PAD, DVT, left to right fem to SFA bypass and right SFA angioplasty with stent placement on 03/2/23, complicated by wound dehiscence of the left groin with graft exposure, and on outpatient cefepime plus micafungin (EOC5/16/23) who was admitted with left foot pain and skin changes concerning for necrosis. This was associated with progressive confusion. She was admitted to  service and continued on cefepime and micafungin. Her  reports confusion was worsened in the hospital since admission 2 days ago. He reports she has ongoing pain of her feet.    Infectious Diseases consulted for "Micafungin"      "

## 2023-04-22 NOTE — PLAN OF CARE
Recommendations  1. Texture per SLP- add diabetic/cardiac restrictions   2. Add Boost GC for optimization of protein and calorie intake   3. RD following     Goals: Meet % of EEN/EPN by RD f/u date  Nutrition Goal Status: goal not met  Communication of RD Recs: POC

## 2023-04-22 NOTE — CONSULTS
[facility]  Infectious Disease  Consult Note    Patient Name: Nelsy Marion  MRN: [id number]  Admission Date: 4/20/2023  Hospital Length of Stay: 0 days  Attending Physician: Kay Benavidez MD  Primary Care Provider: [facility]     Isolation Status: No active isolations    Patient information was obtained from spouse/SO, past medical records and ER records.      Inpatient consult to Infectious Diseases  Consult performed by: Caty Molina MD  Consult ordered by: Sheyla Jaramillo DO        Assessment/Plan:     Neuro  Acute encephalopathy  Concern for cefepime neurotoxicity.   · Monitor for improvement over the next 48-72 hours.     Immunology/Multi System  Vascular inflammation or infection  I have reviewed hospital notes from   service and other specialty providers. I have also reviewed CBC, CMP/BMP,  cultures and imaging with my interpretation as documented.     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 On outpatient cefepime and micafungin. Progressive encephalopathy noted which may be neurotoxicity from cefepime therapy.   Discontinue cefepime.   Past cultures reviewed therefore will start Zosyn every 8 hours (CrCl>20).   Continue micafungin.   Monitor CrCl for further Zosyn dosing optimization.   Discussed management plan with the staff and/or members from  service.      Orthopedic  Wound infection  Due to wound dehiscence during prior admission.  · Antimicrobial therapy as above.      Thank you for your consult. I will follow-up with patient. Please contact us if you have any additional questions.    Caty Molina MD  Infectious Disease  [facility]    Subjective:     Principal Problem: PAD (peripheral artery disease)    HPI: An 82-year-old woman with HTN, DMII, DVT, CHF, PAD, DVT, left to right fem to SFA bypass and right SFA angioplasty with stent placement on 03/2/23, complicated by wound dehiscence of the left groin with graft exposure, and on  "outpatient cefepime plus micafungin (EOC5/16/23) who was admitted with left foot pain and skin changes concerning for necrosis. This was associated with progressive confusion. She was admitted to  service and continued on cefepime and micafungin. Her  reports confusion was worsened in the hospital since admission 2 days ago. He reports she has ongoing pain of her feet.    Infectious Diseases consulted for "Micafungin"          Past Medical History:   Diagnosis Date    CHF (congestive heart failure)     Diabetes mellitus     DVT (deep venous thrombosis)     Hypertension     Miscarriage        Past Surgical History:   Procedure Laterality Date     femoral vascular surgery       ANGIOGRAPHY OF LOWER EXTREMITY Right 2/23/2023    Procedure: Angiogram Extremity Unilateral;  Surgeon: Danny Dunbar MD;  Location: [facility] OR;  Service: Vascular;  Laterality: Right;  RN PHONE PREOP 2/20/2023----CK CONSENT    ANGIOGRAPHY OF LOWER EXTREMITY Right 4/4/2023    Procedure: Angiogram Extremity Unilateral - possible endovascular intervention;  Surgeon: René Simms MD;  Location: [facility] OR [address] [place] [address];  Service: Vascular;  Laterality: Right;  19.6 mins.  740.81 mGy  73.3399 Gy.cm  58ml dye  local - 1ml    CREATION OF FEMORAL-FEMORAL ARTERY BYPASS WITH GRAFT Bilateral 3/2/2023    Procedure: CREATION, BYPASS, ARTERIAL, FEMORAL TO FEMORAL, USING GRAFT, LEFT FEMORAL ENDARTERECTOMY, LEFT ILIAC ARTERY STENT PLACEMENT, RIGHT LOWER EXTREMITY ANGIOGRAM AND RIGHT SFA  ANGIOPLASTY;  Surgeon: Danny Dunbar MD;  Location: [facility] OR [address] [place] [address];  Service: Vascular;  Laterality: Bilateral;   744.47 MGY  175.36 Gycm  Flouro time 20.5 mins      CREATION OF ILIOFEMORAL ARTERY BYPASS Right 3/20/2019    Procedure: CREATION, BYPASS, ARTERIAL, ILIAC TO FEMORAL, RIGHT LOWER EXTREMITY, RIGHT PROFUNDAPLASTY;  Surgeon: Danny Dunbar MD;  Location: [facility] OR;  Service: Vascular;  Laterality: Right;  11:00AM START PER ANNE RICHARDS PREOP " 3/13/2019  ACT'S, CELL SAVER, GRAFTS, BOOK WATER---NEED H/P  CONSENT IN AM------HGBA1C    CREATION OF MUSCLE ROTATIONAL FLAP Left 3/31/2023    Procedure: CREATION, FLAP, MUSCLE ROTATION;  Surgeon: Brandon Smiley MD;  Location: [facility] OR [address] [place] [address];  Service: Plastics;  Laterality: Left;    ESOPHAGOGASTRODUODENOSCOPY N/A 3/17/2023    Procedure: EGD (ESOPHAGOGASTRODUODENOSCOPY);  Surgeon: Gee Rodriguez MD;  [facility]  Service: Endoscopy;  Laterality: N/A;    ESOPHAGOGASTRODUODENOSCOPY N/A 4/11/2023    Procedure: EGD (ESOPHAGOGASTRODUODENOSCOPY);  Surgeon: Nazanin Kaiser MD;  [facility]  Service: Endoscopy;  Laterality: N/A;    FOOT AMPUTATION THROUGH METATARSAL Right 3/4/2023    Procedure: AMPUTATION, FOOT, TRANSMETATARSAL;  Surgeon: Benjamin Godfrey DPM;  Location: [address];  Service: Podiatry;  Laterality: Right;    HYSTERECTOMY      ?unsure cervix present    INCISION AND DRAINAGE Right 4/12/2019    Procedure: Incision and Drainage - R groin washout, possible muscle flap;  Surgeon: Danny Dunbar MD;  Location: [address];  Service: Vascular;  Laterality: Right;  groin washout    INCISION AND DRAINAGE OF GROIN Right 5/3/2019    Procedure: Incision and Drainage R groin;  Surgeon: Danny Dunbar MD;  Location: [address];  Service: Vascular;  Laterality: Right;    WOUND DEBRIDEMENT Left 3/31/2023    Procedure: DEBRIDEMENT, WOUND;  Surgeon: René Simms MD;  Location: [address];  Service: Vascular;  Laterality: Left;       Review of patient's allergies indicates:   Allergen Reactions    Motrin [ibuprofen] Itching       Medications:  Medications Prior to Admission   Medication Sig    amlodipine (NORVASC) 10 MG tablet Take 10 mg by mouth once daily.    aspirin (ECOTRIN) 81 MG EC tablet Take 81 mg by mouth once daily.    atorvastatin (LIPITOR) 40 MG tablet Take 1 tablet (40 mg total) by mouth every evening.     "[] benzonatate (TESSALON) 100 MG capsule Take 1 capsule (100 mg total) by mouth 3 (three) times daily as needed for Cough.    BLOOD SUGAR DIAGNOSTIC (TRUE METRIX GLUCOSE TEST STRIP MISC) by Misc.(Non-Drug; Combo Route) route.    carvediloL (COREG) 12.5 MG tablet Take 12.5 mg by mouth 2 (two) times daily.    cephALEXin (KEFLEX) 500 MG capsule Take 2 capsules (1,000 mg total) by mouth every 12 (twelve) hours.    clopidogreL (PLAVIX) 75 mg tablet Take 1 tablet (75 mg total) by mouth once daily.    DULoxetine (CYMBALTA) 30 MG capsule Take 1 capsule (30 mg total) by mouth 2 (two) times daily.    furosemide (LASIX) 20 MG tablet Take 1 tablet (20 mg total) by mouth once daily.    insulin aspart protamine-insulin aspart (NOVOLOG MIX 70-30FLEXPEN U-100) 100 unit/mL (70-30) InPn pen Inject 12 Units into the skin 2 (two) times daily before meals.    insulin syringe-needle,dispos. 0.3 mL 30 gauge x 5/16" Syrg by Misc.(Non-Drug; Combo Route) route.    losartan-hydrochlorothiazide 100-25 mg (HYZAAR) 100-25 mg per tablet Take 1 tablet by mouth once daily.    metformin (GLUCOPHAGE) 1000 MG tablet Take 1,000 mg by mouth 2 (two) times daily with meals.    ondansetron (ZOFRAN) 8 MG tablet     oxyCODONE (ROXICODONE) 5 MG immediate release tablet Take 1 tablet (5 mg total) by mouth every 6 (six) hours as needed for Pain.    pantoprazole (PROTONIX) 40 MG tablet Take 1 tablet (40 mg total) by mouth 2 (two) times daily.    pen needle, diabetic 32 gauge x 5/32" Ndle Inject 1 each into the skin 4 (four) times daily.     Antibiotics (From admission, onward)      Start     Stop Route Frequency Ordered    23 0625  vancomycin - pharmacy to dose  (vancomycin IVPB)        See Hyperspace for full Linked Orders Report.    -- IV pharmacy to manage frequency 23 0548    23 0445  ceFEPIme (MAXIPIME) 2 g in dextrose 5 % in water (D5W) 5 % 50 mL IVPB (MB+)         -- IV Every 24 hours (non-standard times) 23 " 0338          Antifungals (From admission, onward)      Start     Stop Route Frequency Ordered    23 0445  micafungin 100 mg in sodium chloride 0.9 % 100 mL IVPB (MB+)         -- IV Every 24 hours (non-standard times) 23 0338          Antivirals (From admission, onward)      None               There is no immunization history on file for this patient.    Family History       Problem Relation (Age of Onset)    Diabetes Father    Hypertension Father          Social History     Socioeconomic History    Marital status:    Tobacco Use    Smoking status: Former     Types: Cigarettes     Quit date:      Years since quittin.3    Smokeless tobacco: Never   Substance and Sexual Activity    Alcohol use: No    Drug use: No    Sexual activity: Never     Social Determinants of Health     Financial Resource Strain: Unknown    Difficulty of Paying Living Expenses: Patient refused   Food Insecurity: No Food Insecurity    Worried About Running Out of Food in the Last Year: Never true    Ran Out of Food in the Last Year: Never true   Transportation Needs: No Transportation Needs    Lack of Transportation (Medical): No    Lack of Transportation (Non-Medical): No   Physical Activity: Inactive    Days of Exercise per Week: 0 days    Minutes of Exercise per Session: 0 min   Stress: Unknown    Feeling of Stress : Patient refused   Social Connections: Unknown    Frequency of Communication with Friends and Family: More than three times a week    Frequency of Social Gatherings with Friends and Family: More than three times a week    Attends Yarsanism Services: Patient refused    Active Member of Clubs or Organizations: Patient refused    Attends Club or Organization Meetings: Patient refused    Marital Status:    Housing Stability: Low Risk     Unable to Pay for Housing in the Last Year: No    Number of Places Lived in the Last Year: 1    Unstable Housing in the Last Year: No      Review of Systems   Unable to perform ROS: Mental status change   Objective:     Vital Signs (Most Recent):  Temp: 97.8 °F (36.6 °C) (04/22/23 0747)  Pulse: 79 (04/22/23 0747)  Resp: 18 (04/22/23 0747)  BP: (!) 141/64 (04/22/23 0747)  SpO2: 98 % (04/22/23 0747)   Vital Signs (24h Range):  Temp:  [97.1 °F (36.2 °C)-97.8 °F (36.6 °C)] 97.8 °F (36.6 °C)  Pulse:  [75-90] 79  Resp:  [12-20] 18  SpO2:  [94 %-98 %] 98 %  BP: (127-151)/(59-71) 141/64     Weight: 68 kg (150 lb)  Body mass index is 24.96 kg/m².    Estimated Creatinine Clearance: 21.7 mL/min (A) (based on SCr of 1.8 mg/dL (H)).    Physical Exam  Vitals and nursing note reviewed.   Constitutional:       Appearance: She is well-developed.   HENT:      Head: Normocephalic and atraumatic.      Right Ear: External ear normal.      Left Ear: External ear normal.   Eyes:      General: No scleral icterus.        Right eye: No discharge.         Left eye: No discharge.      Conjunctiva/sclera: Conjunctivae normal.   Cardiovascular:      Rate and Rhythm: Normal rate and regular rhythm.      Heart sounds: Normal heart sounds. No murmur heard.    No friction rub. No gallop.   Pulmonary:      Effort: Pulmonary effort is normal. No respiratory distress.      Breath sounds: Normal breath sounds. No stridor. No wheezing or rales.   Abdominal:      General: Bowel sounds are normal.   Musculoskeletal:         General: No tenderness. Normal range of motion.   Skin:     General: Skin is warm and dry.      Comments: Dark skin discoloration over the dorsal aspect of the left foot. Right foot covered with compression dressing.   Neurological:      Mental Status: She is lethargic.      Comments: Does not answer questions. Follows commands intermittently.       Significant Labs: Blood Culture:   Recent Labs   Lab 03/08/23  2325 03/10/23  0942 03/10/23  0943 03/29/23 2009 04/20/23 2057   LABBLOO No growth after 5 days.  No growth after 5 days. No growth after 5 days. No growth  after 5 days. No growth after 5 days.  No growth after 5 days. No Growth to date  No Growth to date  No Growth to date  No Growth to date     BMP:   Recent Labs   Lab 04/21/23  0500   *   *   K 3.3*      CO2 19*   BUN 56*   CREATININE 1.8*   CALCIUM 9.7   MG 1.9     CBC:   Recent Labs   Lab 04/20/23  2059 04/21/23  0500   WBC 13.22* 11.42   HGB 7.5* 7.4*   HCT 24.1* 24.7*   * 526*     Urine Culture: No results for input(s): LABURIN in the last 4320 hours.  Urine Studies:   Recent Labs   Lab 03/09/23  1427   COLORU Yellow   APPEARANCEUA Clear   PHUR 5.0   SPECGRAV 1.015   PROTEINUA Negative   GLUCUA Negative   KETONESU Negative   BILIRUBINUA Negative   OCCULTUA Negative   NITRITE Negative   LEUKOCYTESUR Negative     Wound Culture:   Recent Labs   Lab 03/04/23  1141 03/10/23  1340 03/31/23  0805 03/31/23  0809   LABAERO No growth PSEUDOMONAS FLUORESCENS GROUP  Few  * FELI ALBICANS  Rare  * FELI GLABRATA  Rare  *       Significant Imaging: I have reviewed all pertinent imaging results/findings within the past 24 hours.

## 2023-04-22 NOTE — ASSESSMENT & PLAN NOTE
Patient with acute kidney injury likely due to IVVD/dehydration LOLY is currently worsening. Labs reviewed- Renal function/electrolytes with Estimated Creatinine Clearance: 24.4 mL/min (A) (based on SCr of 1.6 mg/dL (H)). according to latest data. Monitor urine output and serial BMP and adjust therapy as needed. Avoid nephrotoxins and renally dose meds for GFR listed above.     Cr 2.1 (baseline 0.8-1.5).  Patient dry on physical exam.  Poor oral intake, most likely prerenal in origin.    Recent Labs   Lab 04/20/23  2059 04/21/23  0500 04/22/23  0400    135* 137   K 3.7 3.3* 3.7    104 107   CO2 19* 19* 20*   BUN 59* 56* 49*   CREATININE 2.1* 1.8* 1.6*     Improved with fluids   - CMP qd, trend Cr  - strict I/O  - daily weights  - renally dose all medications  - avoid nephrotoxic agents, NSAIDs, IV contrast, ACE/ARB  - If LOLY doesn't improve consider US retroperitoneal

## 2023-04-23 PROBLEM — Z79.4 TYPE 2 DIABETES MELLITUS WITH CHRONIC KIDNEY DISEASE, WITH LONG-TERM CURRENT USE OF INSULIN: Chronic | Status: ACTIVE | Noted: 2019-03-20

## 2023-04-23 PROBLEM — E11.22 TYPE 2 DIABETES MELLITUS WITH CHRONIC KIDNEY DISEASE, WITH LONG-TERM CURRENT USE OF INSULIN: Chronic | Status: ACTIVE | Noted: 2019-03-20

## 2023-04-23 PROBLEM — Z89.431 S/P TRANSMETATARSAL AMPUTATION OF FOOT, RIGHT: Chronic | Status: ACTIVE | Noted: 2023-03-08

## 2023-04-23 PROBLEM — I50.32 DIASTOLIC CHF, CHRONIC: Chronic | Status: ACTIVE | Noted: 2019-03-20

## 2023-04-23 LAB
ALBUMIN SERPL BCP-MCNC: 1.9 G/DL (ref 3.5–5.2)
ALP SERPL-CCNC: 67 U/L (ref 55–135)
ALT SERPL W/O P-5'-P-CCNC: 5 U/L (ref 10–44)
AMORPH CRY UR QL COMP ASSIST: NORMAL
ANION GAP SERPL CALC-SCNC: 9 MMOL/L (ref 8–16)
AST SERPL-CCNC: 19 U/L (ref 10–40)
BACTERIA #/AREA URNS AUTO: NORMAL /HPF
BASOPHILS # BLD AUTO: 0.02 K/UL (ref 0–0.2)
BASOPHILS # BLD AUTO: 0.02 K/UL (ref 0–0.2)
BASOPHILS NFR BLD: 0.2 % (ref 0–1.9)
BASOPHILS NFR BLD: 0.2 % (ref 0–1.9)
BILIRUB SERPL-MCNC: 0.3 MG/DL (ref 0.1–1)
BILIRUB UR QL STRIP: NEGATIVE
BILIRUB UR QL STRIP: NEGATIVE
BLD PROD TYP BPU: NORMAL
BLOOD UNIT EXPIRATION DATE: NORMAL
BLOOD UNIT TYPE CODE: 7300
BLOOD UNIT TYPE: NORMAL
BUN SERPL-MCNC: 46 MG/DL (ref 8–23)
CALCIUM SERPL-MCNC: 8.9 MG/DL (ref 8.7–10.5)
CHLORIDE SERPL-SCNC: 106 MMOL/L (ref 95–110)
CLARITY UR REFRACT.AUTO: ABNORMAL
CLARITY UR REFRACT.AUTO: ABNORMAL
CO2 SERPL-SCNC: 21 MMOL/L (ref 23–29)
CODING SYSTEM: NORMAL
COLOR UR AUTO: ABNORMAL
COLOR UR AUTO: ABNORMAL
CREAT SERPL-MCNC: 2.1 MG/DL (ref 0.5–1.4)
CROSSMATCH INTERPRETATION: NORMAL
DIFFERENTIAL METHOD: ABNORMAL
DIFFERENTIAL METHOD: ABNORMAL
DISPENSE STATUS: NORMAL
EOSINOPHIL # BLD AUTO: 0.5 K/UL (ref 0–0.5)
EOSINOPHIL # BLD AUTO: 0.5 K/UL (ref 0–0.5)
EOSINOPHIL NFR BLD: 4.6 % (ref 0–8)
EOSINOPHIL NFR BLD: 4.9 % (ref 0–8)
ERYTHROCYTE [DISTWIDTH] IN BLOOD BY AUTOMATED COUNT: 14.7 % (ref 11.5–14.5)
ERYTHROCYTE [DISTWIDTH] IN BLOOD BY AUTOMATED COUNT: 15 % (ref 11.5–14.5)
EST. GFR  (NO RACE VARIABLE): 23.1 ML/MIN/1.73 M^2
GLUCOSE SERPL-MCNC: 129 MG/DL (ref 70–110)
GLUCOSE UR QL STRIP: NEGATIVE
GLUCOSE UR QL STRIP: NEGATIVE
HCT VFR BLD AUTO: 21.8 % (ref 37–48.5)
HCT VFR BLD AUTO: 24.9 % (ref 37–48.5)
HGB BLD-MCNC: 6.5 G/DL (ref 12–16)
HGB BLD-MCNC: 7.9 G/DL (ref 12–16)
HGB UR QL STRIP: ABNORMAL
HGB UR QL STRIP: ABNORMAL
HYALINE CASTS UR QL AUTO: 0 /LPF
IMM GRANULOCYTES # BLD AUTO: 0.02 K/UL (ref 0–0.04)
IMM GRANULOCYTES # BLD AUTO: 0.03 K/UL (ref 0–0.04)
IMM GRANULOCYTES NFR BLD AUTO: 0.2 % (ref 0–0.5)
IMM GRANULOCYTES NFR BLD AUTO: 0.3 % (ref 0–0.5)
KETONES UR QL STRIP: ABNORMAL
KETONES UR QL STRIP: ABNORMAL
LEUKOCYTE ESTERASE UR QL STRIP: NEGATIVE
LEUKOCYTE ESTERASE UR QL STRIP: NEGATIVE
LYMPHOCYTES # BLD AUTO: 1.3 K/UL (ref 1–4.8)
LYMPHOCYTES # BLD AUTO: 1.4 K/UL (ref 1–4.8)
LYMPHOCYTES NFR BLD: 11.9 % (ref 18–48)
LYMPHOCYTES NFR BLD: 13.2 % (ref 18–48)
MAGNESIUM SERPL-MCNC: 1.6 MG/DL (ref 1.6–2.6)
MCH RBC QN AUTO: 25.2 PG (ref 27–31)
MCH RBC QN AUTO: 26.6 PG (ref 27–31)
MCHC RBC AUTO-ENTMCNC: 29.8 G/DL (ref 32–36)
MCHC RBC AUTO-ENTMCNC: 31.7 G/DL (ref 32–36)
MCV RBC AUTO: 84 FL (ref 82–98)
MCV RBC AUTO: 85 FL (ref 82–98)
MICROSCOPIC COMMENT: NORMAL
MONOCYTES # BLD AUTO: 0.5 K/UL (ref 0.3–1)
MONOCYTES # BLD AUTO: 0.5 K/UL (ref 0.3–1)
MONOCYTES NFR BLD: 4.7 % (ref 4–15)
MONOCYTES NFR BLD: 4.7 % (ref 4–15)
NEUTROPHILS # BLD AUTO: 7.7 K/UL (ref 1.8–7.7)
NEUTROPHILS # BLD AUTO: 9.1 K/UL (ref 1.8–7.7)
NEUTROPHILS NFR BLD: 76.7 % (ref 38–73)
NEUTROPHILS NFR BLD: 78.4 % (ref 38–73)
NITRITE UR QL STRIP: NEGATIVE
NITRITE UR QL STRIP: NEGATIVE
NRBC BLD-RTO: 0 /100 WBC
NRBC BLD-RTO: 0 /100 WBC
PH UR STRIP: 6 [PH] (ref 5–8)
PH UR STRIP: 6 [PH] (ref 5–8)
PHOSPHATE SERPL-MCNC: 3.7 MG/DL (ref 2.7–4.5)
PLATELET # BLD AUTO: 337 K/UL (ref 150–450)
PLATELET # BLD AUTO: 391 K/UL (ref 150–450)
PMV BLD AUTO: 10.3 FL (ref 9.2–12.9)
PMV BLD AUTO: 10.4 FL (ref 9.2–12.9)
POCT GLUCOSE: 125 MG/DL (ref 70–110)
POCT GLUCOSE: 151 MG/DL (ref 70–110)
POCT GLUCOSE: 197 MG/DL (ref 70–110)
POCT GLUCOSE: 216 MG/DL (ref 70–110)
POTASSIUM SERPL-SCNC: 3.5 MMOL/L (ref 3.5–5.1)
PROT SERPL-MCNC: 6.7 G/DL (ref 6–8.4)
PROT UR QL STRIP: ABNORMAL
PROT UR QL STRIP: ABNORMAL
RBC # BLD AUTO: 2.58 M/UL (ref 4–5.4)
RBC # BLD AUTO: 2.97 M/UL (ref 4–5.4)
RBC #/AREA URNS AUTO: 4 /HPF (ref 0–4)
SODIUM SERPL-SCNC: 136 MMOL/L (ref 136–145)
SP GR UR STRIP: 1.01 (ref 1–1.03)
SP GR UR STRIP: 1.01 (ref 1–1.03)
SQUAMOUS #/AREA URNS AUTO: 1 /HPF
TRANS ERYTHROCYTES VOL PATIENT: NORMAL ML
URN SPEC COLLECT METH UR: ABNORMAL
URN SPEC COLLECT METH UR: ABNORMAL
WBC # BLD AUTO: 10.03 K/UL (ref 3.9–12.7)
WBC # BLD AUTO: 11.56 K/UL (ref 3.9–12.7)
WBC #/AREA URNS AUTO: 0 /HPF (ref 0–5)

## 2023-04-23 PROCEDURE — 99232 PR SUBSEQUENT HOSPITAL CARE,LEVL II: ICD-10-PCS | Mod: ,,, | Performed by: INTERNAL MEDICINE

## 2023-04-23 PROCEDURE — 83735 ASSAY OF MAGNESIUM: CPT

## 2023-04-23 PROCEDURE — 99233 SBSQ HOSP IP/OBS HIGH 50: CPT | Mod: ,,, | Performed by: INTERNAL MEDICINE

## 2023-04-23 PROCEDURE — 97112 NEUROMUSCULAR REEDUCATION: CPT

## 2023-04-23 PROCEDURE — 99233 PR SUBSEQUENT HOSPITAL CARE,LEVL III: ICD-10-PCS | Mod: ,,, | Performed by: INTERNAL MEDICINE

## 2023-04-23 PROCEDURE — 25000003 PHARM REV CODE 250: Performed by: STUDENT IN AN ORGANIZED HEALTH CARE EDUCATION/TRAINING PROGRAM

## 2023-04-23 PROCEDURE — 25000003 PHARM REV CODE 250: Performed by: INTERNAL MEDICINE

## 2023-04-23 PROCEDURE — 63600175 PHARM REV CODE 636 W HCPCS: Performed by: STUDENT IN AN ORGANIZED HEALTH CARE EDUCATION/TRAINING PROGRAM

## 2023-04-23 PROCEDURE — 51702 INSERT TEMP BLADDER CATH: CPT

## 2023-04-23 PROCEDURE — 63600175 PHARM REV CODE 636 W HCPCS

## 2023-04-23 PROCEDURE — 36430 TRANSFUSION BLD/BLD COMPNT: CPT

## 2023-04-23 PROCEDURE — P9021 RED BLOOD CELLS UNIT: HCPCS

## 2023-04-23 PROCEDURE — 97530 THERAPEUTIC ACTIVITIES: CPT

## 2023-04-23 PROCEDURE — 25000003 PHARM REV CODE 250

## 2023-04-23 PROCEDURE — 85025 COMPLETE CBC W/AUTO DIFF WBC: CPT | Mod: 91 | Performed by: STUDENT IN AN ORGANIZED HEALTH CARE EDUCATION/TRAINING PROGRAM

## 2023-04-23 PROCEDURE — 80053 COMPREHEN METABOLIC PANEL: CPT

## 2023-04-23 PROCEDURE — 85025 COMPLETE CBC W/AUTO DIFF WBC: CPT

## 2023-04-23 PROCEDURE — 99232 SBSQ HOSP IP/OBS MODERATE 35: CPT | Mod: ,,, | Performed by: INTERNAL MEDICINE

## 2023-04-23 PROCEDURE — 84100 ASSAY OF PHOSPHORUS: CPT

## 2023-04-23 PROCEDURE — 63600175 PHARM REV CODE 636 W HCPCS: Performed by: INTERNAL MEDICINE

## 2023-04-23 PROCEDURE — 97165 OT EVAL LOW COMPLEX 30 MIN: CPT

## 2023-04-23 PROCEDURE — 81001 URINALYSIS AUTO W/SCOPE: CPT

## 2023-04-23 PROCEDURE — 97162 PT EVAL MOD COMPLEX 30 MIN: CPT

## 2023-04-23 PROCEDURE — 21400001 HC TELEMETRY ROOM

## 2023-04-23 RX ORDER — ONDANSETRON 2 MG/ML
4 INJECTION INTRAMUSCULAR; INTRAVENOUS EVERY 6 HOURS PRN
Status: DISCONTINUED | OUTPATIENT
Start: 2023-04-23 | End: 2023-05-05 | Stop reason: HOSPADM

## 2023-04-23 RX ORDER — MUPIROCIN 20 MG/G
OINTMENT TOPICAL 2 TIMES DAILY
Status: DISPENSED | OUTPATIENT
Start: 2023-04-23 | End: 2023-04-28

## 2023-04-23 RX ORDER — SODIUM CHLORIDE, SODIUM LACTATE, POTASSIUM CHLORIDE, CALCIUM CHLORIDE 600; 310; 30; 20 MG/100ML; MG/100ML; MG/100ML; MG/100ML
INJECTION, SOLUTION INTRAVENOUS CONTINUOUS
Status: ACTIVE | OUTPATIENT
Start: 2023-04-23 | End: 2023-04-24

## 2023-04-23 RX ADMIN — CARVEDILOL 12.5 MG: 12.5 TABLET, FILM COATED ORAL at 09:04

## 2023-04-23 RX ADMIN — PANTOPRAZOLE SODIUM 40 MG: 40 TABLET, DELAYED RELEASE ORAL at 09:04

## 2023-04-23 RX ADMIN — CLOPIDOGREL BISULFATE 75 MG: 75 TABLET ORAL at 09:04

## 2023-04-23 RX ADMIN — PIPERACILLIN SODIUM AND TAZOBACTAM SODIUM 4.5 G: 4; .5 INJECTION, POWDER, FOR SOLUTION INTRAVENOUS at 03:04

## 2023-04-23 RX ADMIN — MUPIROCIN: 20 OINTMENT TOPICAL at 09:04

## 2023-04-23 RX ADMIN — ATORVASTATIN CALCIUM 40 MG: 40 TABLET, FILM COATED ORAL at 09:04

## 2023-04-23 RX ADMIN — HEPARIN SODIUM 5000 UNITS: 5000 INJECTION INTRAVENOUS; SUBCUTANEOUS at 02:04

## 2023-04-23 RX ADMIN — AMLODIPINE BESYLATE 10 MG: 10 TABLET ORAL at 09:04

## 2023-04-23 RX ADMIN — HEPARIN SODIUM 5000 UNITS: 5000 INJECTION INTRAVENOUS; SUBCUTANEOUS at 09:04

## 2023-04-23 RX ADMIN — MICAFUNGIN SODIUM 100 MG: 100 INJECTION, POWDER, LYOPHILIZED, FOR SOLUTION INTRAVENOUS at 03:04

## 2023-04-23 RX ADMIN — HEPARIN SODIUM 5000 UNITS: 5000 INJECTION INTRAVENOUS; SUBCUTANEOUS at 06:04

## 2023-04-23 RX ADMIN — DULOXETINE 30 MG: 30 CAPSULE, DELAYED RELEASE ORAL at 09:04

## 2023-04-23 RX ADMIN — OXYCODONE HYDROCHLORIDE 5 MG: 5 TABLET ORAL at 09:04

## 2023-04-23 RX ADMIN — SODIUM CHLORIDE, POTASSIUM CHLORIDE, SODIUM LACTATE AND CALCIUM CHLORIDE: 600; 310; 30; 20 INJECTION, SOLUTION INTRAVENOUS at 10:04

## 2023-04-23 RX ADMIN — ASPIRIN 81 MG: 81 TABLET, COATED ORAL at 09:04

## 2023-04-23 RX ADMIN — OXYCODONE HYDROCHLORIDE 10 MG: 10 TABLET ORAL at 03:04

## 2023-04-23 RX ADMIN — PIPERACILLIN SODIUM AND TAZOBACTAM SODIUM 4.5 G: 4; .5 INJECTION, POWDER, FOR SOLUTION INTRAVENOUS at 06:04

## 2023-04-23 RX ADMIN — ONDANSETRON 4 MG: 2 INJECTION INTRAMUSCULAR; INTRAVENOUS at 05:04

## 2023-04-23 NOTE — PT/OT/SLP EVAL
Physical Therapy  Co-Evaluation and co-treatment with OT    Patient Name:  Nelsy Marino   MRN:  [id number]    Recommendations:     Discharge Recommendations: nursing facility, skilled   Discharge Equipment Recommendations: none   Barriers to discharge: Decreased caregiver support family will not be able to assist at current functional level.     Assessment:     Nelsy Marino is a 82 y.o. female admitted with a medical diagnosis of PAD (peripheral artery disease).  She presents with the following impairments/functional limitations: impaired endurance, impaired functional mobility, impaired balance, decreased safety awareness, decreased lower extremity function, decreased coordination, impaired cognition pt tolerated treatment fair but is at a very low functional level and had difficulty following commands.  Pt is significantly below previous functional level, increased risk of falls and increased burden of care currently. Pt will benefit from skilled PT 3x/wk to progress physically. Pt will need SNF placement to maximize rehab potential. (Previous admit: is s/p L fem/fem bypass , R trans met amp)       Rehab Prognosis: Fair; patient would benefit from acute skilled PT services to address these deficits and reach maximum level of function.    Recent Surgery: * No surgery found *      Plan:     During this hospitalization, patient to be seen 3 x/week to address the identified rehab impairments via therapeutic activities, neuromuscular re-education and progress toward the following goals:    Plan of Care Expires:  05/20/23    Subjective     Chief Complaint: pt c/o pain during treatment but could not rate level.   Patient/Family Comments/goals:  goals: pt to be able to do more for herself.   Pain/Comfort:  Pain Rating 1:  (pt c/o back pain but could not rate it.)  Pain Addressed 1: Distraction, Reposition  Pain Rating Post-Intervention 1:  (see above)    Patients cultural, spiritual, Temple conflicts given  the current situation: no    Living Environment:  Pt lives with her  in 1 story with 6 steps and B handrails.   Prior to admission, patients level of function was assist for all activities  used w/c prior to admit.   Equipment used at home: wheelchair, walker, rolling, bedside commode, hospital bed, bath bench.  DME owned (not currently used): none.  Upon discharge, patient will have assistance from , pt sister, sister- in -law and  niece .    Objective:     Communicated with nurse prior to session.  Patient found right sidelying with peripheral IV, PICC line (Visi)  upon PT entry to room.    General Precautions: Standard, fall  Orthopedic Precautions:    Braces:  (Darco shoe)  Respiratory Status: Room air    Exams:  Cognitive Exam:  Patient is oriented to Person  RLE ROM: WFL  RLE Strength: 2-/5 for major muscle groups  LLE ROM: WFL  LLE Strength: 2-/5 for major muscle groups    Functional Mobility:  Bed Mobility:   pt needed verbal cues for hand placement and sequencing for functional mobility.   Rolling Left:  total assistance and of 2 persons  Rolling Right: total assistance and of 2 persons  Supine to Sit: total assistance and of 2 persons  Sit to Supine: total assistance and of 2 persons    Balance: pt sat on EOB x 5 min with CGA.     Due to pt complex medical condition, the skill of 2 licensed therapists is needed to maximize treatment session and progression towards goals  Pt white board updated with current therapists name and level of mobility assistance needed.         AM-PAC 6 CLICK MOBILITY  Total Score:8       Treatment & Education:  Pt and  received verbal instructions in role of PT and POC. Pt  verbally expressed understanding of such. Pt will need re-instruction.     Patient left right sidelying with all lines intact, call button in reach, and  present.    GOALS:   Multidisciplinary Problems       Physical Therapy Goals          Problem: Physical Therapy    Goal  Priority Disciplines Outcome Goal Variances Interventions   Physical Therapy Goal     PT, PT/OT Ongoing, Progressing     Description: Goals to be met by: 5/10/23     Patient will increase functional independence with mobility by performin. Supine to sit with Moderate Assistance  2. Sit to stand transfer with Moderate Assistance  3. Bed to chair transfer with Moderate Assistance using AD if needed and Darco shoe  4. Sitting at edge of bed x10 minutes with Minimal Assistance to perform functional mobility.                          History:     Past Medical History:   Diagnosis Date    CHF (congestive heart failure)     Diabetes mellitus     DVT (deep venous thrombosis)     Hypertension     Miscarriage        Past Surgical History:   Procedure Laterality Date     femoral vascular surgery       ANGIOGRAPHY OF LOWER EXTREMITY Right 2023    Procedure: Angiogram Extremity Unilateral;  Surgeon: Danny Dunbar MD;  Location: [facility] OR;  Service: Vascular;  Laterality: Right;  RN PHONE PREOP 2023----CK CONSENT    ANGIOGRAPHY OF LOWER EXTREMITY Right 2023    Procedure: Angiogram Extremity Unilateral - possible endovascular intervention;  Surgeon: René Simms MD;  Location: [facility] OR [address] [place] [address];  Service: Vascular;  Laterality: Right;  19.6 mins.  740.81 mGy  73.3399 Gy.cm  58ml dye  local - 1ml    CREATION OF FEMORAL-FEMORAL ARTERY BYPASS WITH GRAFT Bilateral 3/2/2023    Procedure: CREATION, BYPASS, ARTERIAL, FEMORAL TO FEMORAL, USING GRAFT, LEFT FEMORAL ENDARTERECTOMY, LEFT ILIAC ARTERY STENT PLACEMENT, RIGHT LOWER EXTREMITY ANGIOGRAM AND RIGHT SFA  ANGIOPLASTY;  Surgeon: Danny Dunbar MD;  Location: [facility] OR [address] [place] [address];  Service: Vascular;  Laterality: Bilateral;   744.47 MGY  175.36 Gycm  Flouro time 20.5 mins      CREATION OF ILIOFEMORAL ARTERY BYPASS Right 3/20/2019    Procedure: CREATION, BYPASS, ARTERIAL, ILIAC TO FEMORAL, RIGHT LOWER EXTREMITY, RIGHT PROFUNDAPLASTY;  Surgeon: Danny CAGE  MD Bettina;  Location: [facility];  Service: Vascular;  Laterality: Right;  11:00AM START PER NAZANIN RICHARDS PREOP 3/13/2019  ACT'S, CELL SAVER, GRAFTS, BOOK WATER---NEED H/P  CONSENT IN AM------HGBA1C    CREATION OF MUSCLE ROTATIONAL FLAP Left 3/31/2023    Procedure: CREATION, FLAP, MUSCLE ROTATION;  Surgeon: Brandon Smiley MD;  [facility];  Service: Plastics;  Laterality: Left;    ESOPHAGOGASTRODUODENOSCOPY N/A 3/17/2023    Procedure: EGD (ESOPHAGOGASTRODUODENOSCOPY);  Surgeon: Gee Rodriguez MD;  [facility]  Service: Endoscopy;  Laterality: N/A;    ESOPHAGOGASTRODUODENOSCOPY N/A 4/11/2023    Procedure: EGD (ESOPHAGOGASTRODUODENOSCOPY);  Surgeon: Nazanin Kaiser MD;  [facility]  Service: Endoscopy;  Laterality: N/A;    FOOT AMPUTATION THROUGH METATARSAL Right 3/4/2023    Procedure: AMPUTATION, FOOT, TRANSMETATARSAL;  Surgeon: Benjamin Godfrey DPM;  Location: [facility] OR [address] [place] [address];  Service: Podiatry;  Laterality: Right;    HYSTERECTOMY      ?unsure cervix present    INCISION AND DRAINAGE Right 4/12/2019    Procedure: Incision and Drainage - R groin washout, possible muscle flap;  Surgeon: Danny Dunbar MD;  Location: [address];  Service: Vascular;  Laterality: Right;  groin washout    INCISION AND DRAINAGE OF GROIN Right 5/3/2019    Procedure: Incision and Drainage R groin;  Surgeon: Danny Dunbar MD;  Location: [facility] OR [address] [place] [address];  Service: Vascular;  Laterality: Right;    WOUND DEBRIDEMENT Left 3/31/2023    Procedure: DEBRIDEMENT, WOUND;  Surgeon: René Simms MD;  [address]  Service: Vascular;  Laterality: Left;       Time Tracking:     PT Received On: 04/23/23  PT Start Time: 0939     PT Stop Time: 0955  PT Total Time (min): 16 min     Billable Minutes: Evaluation 8 min  and Therapeutic Activity 8 min      04/23/2023

## 2023-04-23 NOTE — SUBJECTIVE & OBJECTIVE
Interval History:  Mentation significantly improved since yesterday.CTH negative. Delay in receiving blood bc positive indirect avani. pRBC transfused. Paniagua placed for 600 cc urinary retention. Ivf restarted for LOLY    Past Medical History:   Diagnosis Date    CHF (congestive heart failure)     Diabetes mellitus     DVT (deep venous thrombosis)     Hypertension     Miscarriage        Past Surgical History:   Procedure Laterality Date     femoral vascular surgery       ANGIOGRAPHY OF LOWER EXTREMITY Right 2/23/2023    Procedure: Angiogram Extremity Unilateral;  Surgeon: Danny Dunbar MD;  Location: [facility];  Service: Vascular;  Laterality: Right;  RN PHONE PREOP 2/20/2023----CK CONSENT    ANGIOGRAPHY OF LOWER EXTREMITY Right 4/4/2023    Procedure: Angiogram Extremity Unilateral - possible endovascular intervention;  Surgeon: René Simms MD;  Location: [facility] OR [address] [place] [address];  Service: Vascular;  Laterality: Right;  19.6 mins.  740.81 mGy  73.3399 Gy.cm  58ml dye  local - 1ml    CREATION OF FEMORAL-FEMORAL ARTERY BYPASS WITH GRAFT Bilateral 3/2/2023    Procedure: CREATION, BYPASS, ARTERIAL, FEMORAL TO FEMORAL, USING GRAFT, LEFT FEMORAL ENDARTERECTOMY, LEFT ILIAC ARTERY STENT PLACEMENT, RIGHT LOWER EXTREMITY ANGIOGRAM AND RIGHT SFA  ANGIOPLASTY;  Surgeon: Danny Dunbar MD;  Location: [facility] OR [address] [place] [address];  Service: Vascular;  Laterality: Bilateral;   744.47 MGY  175.36 Gycm  Flouro time 20.5 mins      CREATION OF ILIOFEMORAL ARTERY BYPASS Right 3/20/2019    Procedure: CREATION, BYPASS, ARTERIAL, ILIAC TO FEMORAL, RIGHT LOWER EXTREMITY, RIGHT PROFUNDAPLASTY;  Surgeon: Danny Dunbar MD;  Location: [facility];  Service: Vascular;  Laterality: Right;  11:00AM START PER ANNE RICHARDS PREOP 3/13/2019  ACT'S, CELL SAVER, GRAFTS, BOOK WATER---NEED H/P  CONSENT IN AM------HGBA1C    CREATION OF MUSCLE ROTATIONAL FLAP Left 3/31/2023    Procedure: CREATION, FLAP, MUSCLE ROTATION;  Surgeon: Brandon Smiley MD;   [address];  Service: Plastics;  Laterality: Left;    ESOPHAGOGASTRODUODENOSCOPY N/A 3/17/2023    Procedure: EGD (ESOPHAGOGASTRODUODENOSCOPY);  Surgeon: Gee Rodriguez MD;  [facility]  Service: Endoscopy;  Laterality: N/A;    ESOPHAGOGASTRODUODENOSCOPY N/A 4/11/2023    Procedure: EGD (ESOPHAGOGASTRODUODENOSCOPY);  Surgeon: Nazanin Kaiser MD;  [facility]  Service: Endoscopy;  Laterality: N/A;    FOOT AMPUTATION THROUGH METATARSAL Right 3/4/2023    Procedure: AMPUTATION, FOOT, TRANSMETATARSAL;  Surgeon: Benjamin Godfrey DPM;  Location: [address];  Service: Podiatry;  Laterality: Right;    HYSTERECTOMY      ?unsure cervix present    INCISION AND DRAINAGE Right 4/12/2019    Procedure: Incision and Drainage - R groin washout, possible muscle flap;  Surgeon: Danny Dunbar MD;  [address];  Service: Vascular;  Laterality: Right;  groin washout    INCISION AND DRAINAGE OF GROIN Right 5/3/2019    Procedure: Incision and Drainage R groin;  Surgeon: Danny Dunbar MD;  Location: [address];  Service: Vascular;  Laterality: Right;    WOUND DEBRIDEMENT Left 3/31/2023    Procedure: DEBRIDEMENT, WOUND;  Surgeon: René Simms MD;  Location: [address];  Service: Vascular;  Laterality: Left;       Review of patient's allergies indicates:   Allergen Reactions    Motrin [ibuprofen] Itching       No current facility-administered medications on file prior to encounter.     Current Outpatient Medications on File Prior to Encounter   Medication Sig    amlodipine (NORVASC) 10 MG tablet Take 10 mg by mouth once daily.    aspirin (ECOTRIN) 81 MG EC tablet Take 81 mg by mouth once daily.    atorvastatin (LIPITOR) 40 MG tablet Take 1 tablet (40 mg total) by mouth every evening.    BLOOD SUGAR DIAGNOSTIC (TRUE METRIX GLUCOSE TEST STRIP MISC) by Misc.(Non-Drug; Combo Route) route.    carvediloL (COREG) 12.5 MG tablet Take 12.5 mg by  "mouth 2 (two) times daily.    cephALEXin (KEFLEX) 500 MG capsule Take 2 capsules (1,000 mg total) by mouth every 12 (twelve) hours.    clopidogreL (PLAVIX) 75 mg tablet Take 1 tablet (75 mg total) by mouth once daily.    DULoxetine (CYMBALTA) 30 MG capsule Take 1 capsule (30 mg total) by mouth 2 (two) times daily.    furosemide (LASIX) 20 MG tablet Take 1 tablet (20 mg total) by mouth once daily.    insulin aspart protamine-insulin aspart (NOVOLOG MIX 70-30FLEXPEN U-100) 100 unit/mL (70-30) InPn pen Inject 12 Units into the skin 2 (two) times daily before meals.    insulin syringe-needle,dispos. 0.3 mL 30 gauge x 5/16" Syrg by Misc.(Non-Drug; Combo Route) route.    losartan-hydrochlorothiazide 100-25 mg (HYZAAR) 100-25 mg per tablet Take 1 tablet by mouth once daily.    metformin (GLUCOPHAGE) 1000 MG tablet Take 1,000 mg by mouth 2 (two) times daily with meals.    ondansetron (ZOFRAN) 8 MG tablet     oxyCODONE (ROXICODONE) 5 MG immediate release tablet Take 1 tablet (5 mg total) by mouth every 6 (six) hours as needed for Pain.    pantoprazole (PROTONIX) 40 MG tablet Take 1 tablet (40 mg total) by mouth 2 (two) times daily.    pen needle, diabetic 32 gauge x 5/32" Ndle Inject 1 each into the skin 4 (four) times daily.     Family History       Problem Relation (Age of Onset)    Diabetes Father    Hypertension Father          Tobacco Use    Smoking status: Former     Types: Cigarettes     Quit date:      Years since quittin.3    Smokeless tobacco: Never   Substance and Sexual Activity    Alcohol use: No    Drug use: No    Sexual activity: Never     Review of Systems   Constitutional:  Negative for activity change, appetite change and fever.   HENT: Negative.     Eyes: Negative.    Respiratory:  Negative for cough, shortness of breath and wheezing.    Cardiovascular:  Negative for chest pain and leg swelling.   Gastrointestinal:  Negative for abdominal pain, constipation, diarrhea, nausea and vomiting. "   Endocrine: Negative.    Genitourinary: Negative.    Musculoskeletal:  Positive for arthralgias and myalgias.   Skin:  Positive for color change and wound. Negative for rash.   Allergic/Immunologic: Negative.    Neurological:  Negative for headaches.   Hematological: Negative.    Psychiatric/Behavioral:  Positive for confusion.    Objective:     Vital Signs (Most Recent):  Temp: 97.3 °F (36.3 °C) (04/23/23 0812)  Pulse: 86 (04/23/23 1042)  Resp: 18 (04/23/23 0926)  BP: 137/70 (04/23/23 0812)  SpO2: 96 % (04/23/23 1042)   Vital Signs (24h Range):  Temp:  [97.2 °F (36.2 °C)-99.7 °F (37.6 °C)] 97.3 °F (36.3 °C)  Pulse:  [85-91] 86  Resp:  [16-20] 18  SpO2:  [95 %-97 %] 96 %  BP: (126-145)/(60-89) 137/70     Weight: 68 kg (150 lb)  Body mass index is 24.96 kg/m².    Physical Exam  Vitals and nursing note reviewed.   Constitutional:       General: She is not in acute distress.     Appearance: She is ill-appearing.   HENT:      Head: Normocephalic and atraumatic.      Mouth/Throat:      Mouth: Mucous membranes are dry.      Pharynx: Oropharynx is clear.   Eyes:      Extraocular Movements: Extraocular movements intact.      Conjunctiva/sclera: Conjunctivae normal.   Cardiovascular:      Rate and Rhythm: Regular rhythm. Tachycardia present.      Pulses: Normal pulses.   Pulmonary:      Effort: Pulmonary effort is normal. No respiratory distress.      Breath sounds: No wheezing or rales.   Abdominal:      Palpations: Abdomen is soft.      Tenderness: There is no abdominal tenderness. There is no guarding.   Musculoskeletal:      Cervical back: Normal range of motion and neck supple.      Right lower leg: No edema.      Left lower leg: No edema.   Skin:     General: Skin is warm.      Findings: Lesion (R heel, L dorsum with darkened skin) present.   Neurological:      General: No focal deficit present.      Mental Status: Mental status is at baseline.      Comments: Following commands  Able to respond yes/no           Significant Labs: All pertinent labs within the past 24 hours have been reviewed.    Significant Imaging: I have reviewed all pertinent imaging results/findings within the past 24 hours.

## 2023-04-23 NOTE — PLAN OF CARE
Problem: Physical Therapy  Goal: Physical Therapy Goal  Description: Goals to be met by: 5/10/23     Patient will increase functional independence with mobility by performin. Supine to sit with Moderate Assistance  2. Sit to stand transfer with Moderate Assistance  3. Bed to chair transfer with Moderate Assistance using AD if needed and Darco shoe  4. Sitting at edge of bed x10 minutes with Minimal Assistance to perform functional mobility.     Outcome: Ongoing, Progressing   Evaluation completed and goals appropriate. 2023

## 2023-04-23 NOTE — ASSESSMENT & PLAN NOTE
Nelsy Marino is an 82yoF with known PAD s/p fem-fem and fem-SMA bypass in March of this year. This was complicated by wound dehiscence, exposed graft requiring a right rectus femoris flap. The patient returns to the hospital with complaints of persistent left foot pain, left foot discoloration. Vascular surgery consulted for evaluation.     - patient seen and examined  - labs and imaging reviewed   - arterial US reviewed  - biphasic doppler signals to left AT and PT   - repeat ARCHIE and toe pressures reviewed   - not much more we can optimize from a surgical perspective  - appreciate podiatry and wound care  - recommend plastic surgery consult for right groin rectus femoris flap/wound evaluation  - continue aspirin, statin, plavix  - PT/OT  - please call with questions

## 2023-04-23 NOTE — ASSESSMENT & PLAN NOTE
I have reviewed hospital notes from   service and other specialty providers. I have also reviewed CBC, CMP/BMP,  cultures and imaging with my interpretation as documented.     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 On outpatient cefepime and micafungin. Progressive encephalopathy noted which may be neurotoxicity from cefepime therapy. Cefepime held on 4/22. Participated with PT/OT however did not participate during my examination.   Continue Zosyn.   Continue micafungin.   Monitor CrCl for further Zosyn dosing optimization.   Discussed management plan with the staff and/or members from  service.

## 2023-04-23 NOTE — SUBJECTIVE & OBJECTIVE
Medications:  Continuous Infusions:   lactated ringers       Scheduled Meds:   amLODIPine  10 mg Oral Daily    aspirin  81 mg Oral Daily    atorvastatin  40 mg Oral QHS    carvediloL  12.5 mg Oral BID    clopidogreL  75 mg Oral Daily    DULoxetine  30 mg Oral BID    heparin (porcine)  5,000 Units Subcutaneous Q8H    micafungin (MYCAMINE) IVPB  100 mg Intravenous Q24H    mupirocin   Nasal BID    pantoprazole  40 mg Oral BID    piperacillin-tazobactam (ZOSYN) IVPB  4.5 g Intravenous Q8H     PRN Meds:sodium chloride, dextrose 10%, dextrose 10%, dextrose, dextrose, glucagon (human recombinant), HYDROmorphone, insulin aspart U-100, naloxone, oxyCODONE, oxyCODONE, sodium chloride 0.9%     Objective:     Vital Signs (Most Recent):  Temp: 97.3 °F (36.3 °C) (04/23/23 0812)  Pulse: 87 (04/23/23 0812)  Resp: 18 (04/23/23 0926)  BP: 137/70 (04/23/23 0812)  SpO2: 96 % (04/23/23 0812) Vital Signs (24h Range):  Temp:  [97.2 °F (36.2 °C)-99.7 °F (37.6 °C)] 97.3 °F (36.3 °C)  Pulse:  [81-91] 87  Resp:  [15-20] 18  SpO2:  [95 %-96 %] 96 %  BP: (126-145)/(60-89) 137/70         Physical Exam  Vitals and nursing note reviewed.   Constitutional:       Appearance: She is ill-appearing.   HENT:      Nose: Nose normal.   Cardiovascular:      Rate and Rhythm: Normal rate and regular rhythm.      Comments: Biphasic doppler signals to left AT and PT pulses  Pulmonary:      Effort: Pulmonary effort is normal. No respiratory distress.   Abdominal:      General: Abdomen is flat. There is no distension.      Palpations: Abdomen is soft.   Skin:     Comments: Right TMA with ACE wrap in place  Left foot with skin discoloration to the forefoot, worst between the great and second toe  Left groin with dressing in place; granulation tissue at wound bed   Neurological:      Mental Status: She is alert.       Significant Labs:  CBC:   Recent Labs   Lab 04/23/23  0400   WBC 10.03   RBC 2.58*   HGB 6.5*   HCT 21.8*      MCV 85   MCH 25.2*   MCHC  29.8*     CMP:   Recent Labs   Lab 04/23/23  0400   *   CALCIUM 8.9   ALBUMIN 1.9*   PROT 6.7      K 3.5   CO2 21*      BUN 46*   CREATININE 2.1*   ALKPHOS 67   ALT 5*   AST 19   BILITOT 0.3       Significant Diagnostics:  I have reviewed all pertinent imaging results/findings within the past 24 hours.

## 2023-04-23 NOTE — PROGRESS NOTES
[facility]  Infectious Disease  Progress Note    Patient Name: Nelsy Marino  MRN: [id number]  Admission Date: 4/20/2023  Length of Stay: 1 days  Attending Physician: Kay Benavidez MD  Primary Care Provider: [facility]    Isolation Status: No active isolations  Assessment/Plan:      Neuro  Acute encephalopathy  Concern for cefepime neurotoxicity.   · Monitor for improvement over the next 24-48 hours.   · Consider minimizing sedating agents.    Immunology/Multi System  Vascular inflammation or infection  I have reviewed hospital notes from   service and other specialty providers. I have also reviewed CBC, CMP/BMP,  cultures and imaging with my interpretation as documented.     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 On outpatient cefepime and micafungin. Progressive encephalopathy noted which may be neurotoxicity from cefepime therapy. Cefepime held on 4/22. Participated with PT/OT however did not participate during my examination.   Continue Zosyn.   Continue micafungin.   Monitor CrCl for further Zosyn dosing optimization.   Discussed management plan with the staff and/or members from  service.      Orthopedic  Wound infection  Due to wound dehiscence during prior admission.  · Antimicrobial therapy as above.      Anticipated Disposition: per primary    Thank you for your consult. I will follow-up with patient. Please contact us if you have any additional questions.    Caty Molina MD  Infectious Disease  [facility]    Subjective:     Principal Problem:PAD (peripheral artery disease)    HPI: An 82-year-old woman with HTN, DMII, DVT, CHF, PAD, DVT, left to right fem to SFA bypass and right SFA angioplasty with stent placement on 03/2/23, complicated by wound dehiscence of the left groin with graft exposure, and on outpatient cefepime plus micafungin (EOC5/16/23) who was admitted with left foot pain and skin changes concerning for necrosis. This was  "associated with progressive confusion. She was admitted to  service and continued on cefepime and micafungin. Her  reports confusion was worsened in the hospital since admission 2 days ago. He reports she has ongoing pain of her feet.    Infectious Diseases consulted for "Micafungin"        Interval History: "". Encephalopathy still present. Cefepime transitioned to Zosyn on 4/22. Participated with PT/OT to sit at edge of bed.    Review of Systems   Unable to perform ROS: Mental status change   Objective:     Vital Signs (Most Recent):  Temp: 97.5 °F (36.4 °C) (04/23/23 1543)  Pulse: 86 (04/23/23 1042)  Resp: 18 (04/23/23 0926)  BP: 137/70 (04/23/23 0812)  SpO2: 96 % (04/23/23 1042) Vital Signs (24h Range):  Temp:  [97.2 °F (36.2 °C)-99.7 °F (37.6 °C)] 97.5 °F (36.4 °C)  Pulse:  [85-90] 86  Resp:  [16-20] 18  SpO2:  [95 %-97 %] 96 %  BP: (126-145)/(60-89) 137/70     Weight: 68 kg (150 lb)  Body mass index is 24.96 kg/m².    Estimated Creatinine Clearance: 18.6 mL/min (A) (based on SCr of 2.1 mg/dL (H)).    Physical Exam  Vitals and nursing note reviewed.   Constitutional:       Appearance: She is well-developed.   HENT:      Head: Normocephalic and atraumatic.      Right Ear: External ear normal.      Left Ear: External ear normal.   Eyes:      General: No scleral icterus.        Right eye: No discharge.         Left eye: No discharge.      Conjunctiva/sclera: Conjunctivae normal.   Cardiovascular:      Rate and Rhythm: Normal rate and regular rhythm.      Heart sounds: Normal heart sounds. No murmur heard.    No friction rub. No gallop.   Pulmonary:      Effort: Pulmonary effort is normal. No respiratory distress.      Breath sounds: Normal breath sounds. No stridor. No wheezing or rales.   Abdominal:      General: Bowel sounds are normal.   Musculoskeletal:         General: No tenderness. Normal range of motion.   Skin:     General: Skin is warm and dry.      Comments: Dark skin discoloration over the " dorsal aspect of the left foot. Right foot covered with compression dressing.   Neurological:      Mental Status: She is lethargic.      Comments: Does not answer questions. Follows commands intermittently.       Significant Labs: Blood Culture:   Recent Labs   Lab 03/08/23  2325 03/10/23  0942 03/10/23  0943 03/29/23 2009 04/20/23 2057   LABBLOO No growth after 5 days.  No growth after 5 days. No growth after 5 days. No growth after 5 days. No growth after 5 days.  No growth after 5 days. No Growth to date  No Growth to date  No Growth to date  No Growth to date  No Growth to date  No Growth to date     BMP:   Recent Labs   Lab 04/23/23  0400   *      K 3.5      CO2 21*   BUN 46*   CREATININE 2.1*   CALCIUM 8.9   MG 1.6     CBC:   Recent Labs   Lab 04/22/23  0400 04/22/23  2000 04/23/23  0400   WBC 9.16 10.34 10.03   HGB 6.6* 6.6* 6.5*   HCT 22.0* 21.6* 21.8*    377 391     Urine Culture: No results for input(s): LABURIN in the last 4320 hours.  Urine Studies:   Recent Labs   Lab 04/23/23  1116   COLORU Straw  Straw   APPEARANCEUA Cloudy*  Cloudy*   PHUR 6.0  6.0   SPECGRAV 1.015  1.015   PROTEINUA 1+*  1+*   GLUCUA Negative  Negative   KETONESU Trace*  Trace*   BILIRUBINUA Negative  Negative   OCCULTUA 1+*  1+*   NITRITE Negative  Negative   LEUKOCYTESUR Negative  Negative   RBCUA 4   WBCUA 0   BACTERIA Rare   SQUAMEPITHEL 1   HYALINECASTS 0     Wound Culture:   Recent Labs   Lab 03/04/23  1141 03/10/23  1340 03/31/23  0805 03/31/23  0809   LABAERO No growth PSEUDOMONAS FLUORESCENS GROUP  Few  * FELI ALBICANS  Rare  * FELI GLABRATA  Rare  *       Significant Imaging: I have reviewed all pertinent imaging results/findings within the past 24 hours.

## 2023-04-23 NOTE — PT/OT/SLP EVAL
Occupational Therapy   Evaluation    Name: Nelsy Marino  MRN: [id number]  Admitting Diagnosis: PAD (peripheral artery disease)  Recent Surgery: * No surgery found *      Recommendations:     Discharge Recommendations: nursing facility, skilled  Discharge Equipment Recommendations:  none  Barriers to discharge:  None    Assessment:     Nelsy Marino is a 82 y.o. female with a medical diagnosis of PAD (peripheral artery disease).  She presents with (L) foot necrosis. Pt very drowsy during session requiring verbal/tactile stimuli to arouse. Pt sat EOB with CGA but appearing very weak having difficulty even lifting/holding up her arms. Performance deficits affecting function: weakness, impaired endurance, decreased coordination, impaired self care skills, impaired functional mobility, gait instability, impaired balance, pain, decreased safety awareness.      Rehab Prognosis: Good; patient would benefit from acute skilled OT services to address these deficits and reach maximum level of function.       Plan:     Patient to be seen 3 x/week to address the above listed problems via self-care/home management, therapeutic activities, therapeutic exercises  Plan of Care Expires: 05/23/23  Plan of Care Reviewed with: patient, spouse    Subjective     Occupational Profile:  Living Environment: Pt and her  live in a [facility] with 1 LUMA. She has a tub/shower with a shower chair  Previous level of function: Assistance with bed mobility & t/fs as well as most complex ADLs; Recently, has been spending most of the time up in a W/C.  Roles and Routines: home body, sister, wife  Assistance upon Discharge: Upon discharge, pt will have assistance from family  Equipment Used at Home: walker, rolling, shower chair, hospital bed, wheelchair, bedside commode    Pain/Comfort:  Pain Rating 1: 0/10    Patients cultural, spiritual, [employer]ism conflicts given the current situation:      Objective:     Communicated with: rn prior to session.   Patient found supine with telemetry, WILTON drain, peripheral IV upon OT entry to room.    General Precautions: Standard, fall  Orthopedic Precautions:    Braces:    Respiratory Status: Room air    Occupational Performance:    Bed Mobility:    Patient completed Scooting/Bridging with total assistance  Patient completed Supine to Sit with total assistance  Patient completed Sit to Supine with total assistance and 2 persons    Functional Mobility/Transfers:  Deferred due to decreased alertness.    Activities of Daily Living:  Grooming: Attempted to wipe face with washcloth but arms kept plopping down when she tried to lift them.  Lower Body Dressing: total assistance    Cognitive/Visual Perceptual:  Cognitive/Psychosocial Skills:     -       Oriented to: Person   -       Follows Commands/attention:Follows one-step commands  -       Safety awareness/insight to disability: impaired     Physical Exam:  BUE AROM/MMT: Very weak - difficult to accurately assess due to inconsistent command following.  EOB balance = CGA x  5 minutes.    AMPAC 6 Click ADL:  AMPAC Total Score: 13    Treatment & Education:  UE ROM/MMT  Bed mobility training / assessment  Sitting balance assessment  Discussed OT POC / Post-acute plan    Patient left supine with all lines intact and call button in reach    GOALS:   Multidisciplinary Problems       Occupational Therapy Goals          Problem: Occupational Therapy    Goal Priority Disciplines Outcome Interventions   Occupational Therapy Goal     OT, PT/OT Ongoing, Progressing    Description: Goals to be met by: 4/30/23    Patient will increase functional independence with ADLs by performing:    LE Dressing with Moderate Assistance.  Grooming while standing with Contact Guard Assistance.  Toileting from bedside commode with Minimal Assistance for hygiene and clothing management.   Supine to sit with Moderate Assistance.  Step transfer with Moderate Assistance  Toilet transfer to bedside commode with  Moderate Assistance.                         History:     Past Medical History:   Diagnosis Date    CHF (congestive heart failure)     Diabetes mellitus     DVT (deep venous thrombosis)     Hypertension     Miscarriage          Past Surgical History:   Procedure Laterality Date     femoral vascular surgery       ANGIOGRAPHY OF LOWER EXTREMITY Right 2/23/2023    Procedure: Angiogram Extremity Unilateral;  Surgeon: Danny Dunbar MD;  Location: [facility];  Service: Vascular;  Laterality: Right;  RN PHONE PREOP 2/20/2023----CK CONSENT    ANGIOGRAPHY OF LOWER EXTREMITY Right 4/4/2023    Procedure: Angiogram Extremity Unilateral - possible endovascular intervention;  Surgeon: René Simms MD;  Location: [facility] OR [address] [place] [address];  Service: Vascular;  Laterality: Right;  19.6 mins.  740.81 mGy  73.3399 Gy.cm  58ml dye  local - 1ml    CREATION OF FEMORAL-FEMORAL ARTERY BYPASS WITH GRAFT Bilateral 3/2/2023    Procedure: CREATION, BYPASS, ARTERIAL, FEMORAL TO FEMORAL, USING GRAFT, LEFT FEMORAL ENDARTERECTOMY, LEFT ILIAC ARTERY STENT PLACEMENT, RIGHT LOWER EXTREMITY ANGIOGRAM AND RIGHT SFA  ANGIOPLASTY;  Surgeon: Danny Dunbar MD;  Location: [facility] OR [address] [place] [address];  Service: Vascular;  Laterality: Bilateral;   744.47 MGY  175.36 Gycm  Flouro time 20.5 mins      CREATION OF ILIOFEMORAL ARTERY BYPASS Right 3/20/2019    Procedure: CREATION, BYPASS, ARTERIAL, ILIAC TO FEMORAL, RIGHT LOWER EXTREMITY, RIGHT PROFUNDAPLASTY;  Surgeon: Danny Dunbar MD;  Location: [facility];  Service: Vascular;  Laterality: Right;  11:00AM START PER ANNE RICHARDS PREOP 3/13/2019  ACT'S, CELL SAVER, GRAFTS, BOOK WATER---NEED H/P  CONSENT IN AM------HGBA1C    CREATION OF MUSCLE ROTATIONAL FLAP Left 3/31/2023    Procedure: CREATION, FLAP, MUSCLE ROTATION;  Surgeon: Brandon Smiley MD;  Location: [facility] OR [address] [place] [address];  Service: Plastics;  Laterality: Left;    ESOPHAGOGASTRODUODENOSCOPY N/A 3/17/2023    Procedure: EGD (ESOPHAGOGASTRODUODENOSCOPY);   Surgeon: Gee Rodriguez MD;  Location: [facility] ENDO (2ND FLR);  Service: Endoscopy;  Laterality: N/A;    ESOPHAGOGASTRODUODENOSCOPY N/A 4/11/2023    Procedure: EGD (ESOPHAGOGASTRODUODENOSCOPY);  Surgeon: Nazanin Kaiser MD;  Location: [facility] ENDO (2ND FLR);  Service: Endoscopy;  Laterality: N/A;    FOOT AMPUTATION THROUGH METATARSAL Right 3/4/2023    Procedure: AMPUTATION, FOOT, TRANSMETATARSAL;  Surgeon: Benjamin Godfrey DPM;  [facility];  Service: Podiatry;  Laterality: Right;    HYSTERECTOMY      ?unsure cervix present    INCISION AND DRAINAGE Right 4/12/2019    Procedure: Incision and Drainage - R groin washout, possible muscle flap;  Surgeon: Danny Dunbar MD;  [address];  Service: Vascular;  Laterality: Right;  groin washout    INCISION AND DRAINAGE OF GROIN Right 5/3/2019    Procedure: Incision and Drainage R groin;  Surgeon: Danny Dunbar MD;  [address];  Service: Vascular;  Laterality: Right;    WOUND DEBRIDEMENT Left 3/31/2023    Procedure: DEBRIDEMENT, WOUND;  Surgeon: René Simms MD;  Location: [address];  Service: Vascular;  Laterality: Left;       Time Tracking:     OT Date of Treatment: 04/23/23  OT Start Time: 0938  OT Stop Time: 0955  OT Total Time (min): 17 min    Billable Minutes:Evaluation 5  Neuromuscular Re-education 12    4/23/2023

## 2023-04-23 NOTE — PROGRESS NOTES
Pharmacist Renal Dose Adjustment Note    Nelsy Marino is a 82 y.o. female being treated with the medication zosyn    Patient Data:    Vital Signs (Most Recent):  Temp: 97.3 °F (36.3 °C) (04/23/23 0812)  Pulse: 86 (04/23/23 1042)  Resp: 18 (04/23/23 0926)  BP: 137/70 (04/23/23 0812)  SpO2: 96 % (04/23/23 1042) Vital Signs (72h Range):  Temp:  [97.1 °F (36.2 °C)-99.7 °F (37.6 °C)]   Pulse:  [75-98]   Resp:  [12-20]   BP: (110-215)/()   SpO2:  [94 %-100 %]      Recent Labs   Lab 04/21/23  0500 04/22/23  0400 04/23/23  0400   CREATININE 1.8* 1.6* 2.1*     Serum creatinine: 2.1 mg/dL (H) 04/23/23 0400  Estimated creatinine clearance: 18.6 mL/min (A)    Medication:zosyn 4.5 g Q8H will be changed to Q12H     Pharmacist's Name: Inna Oro  Pharmacist's Extension: [phone]

## 2023-04-23 NOTE — ASSESSMENT & PLAN NOTE
Concern for cefepime neurotoxicity.   · Monitor for improvement over the next 24-48 hours.   · Consider minimizing sedating agents.

## 2023-04-23 NOTE — ASSESSMENT & PLAN NOTE
Patient with acute kidney injury likely due to IVVD/dehydration LOLY is currently worsening. Labs reviewed- Renal function/electrolytes with Estimated Creatinine Clearance: 18.6 mL/min (A) (based on SCr of 2.1 mg/dL (H)). according to latest data. Monitor urine output and serial BMP and adjust therapy as needed. Avoid nephrotoxins and renally dose meds for GFR listed above.     Cr 2.1 (baseline 0.8-1.5).  Patient dry on physical exam.  Poor oral intake, most likely prerenal in origin.    Recent Labs   Lab 04/22/23  0400 04/23/23  0400    136   K 3.7 3.5    106   CO2 20* 21*   BUN 49* 46*   CREATININE 1.6* 2.1*     Improved with fluids   - Continue IVF  - P[place] placed after patient retaining on bladder scan  - f/u retroperitoneal u/s  - CMP qd, trend Cr  - strict I/O  - daily weights  - renally dose all medications  - avoid nephrotoxic agents, NSAIDs, IV contrast, ACE/ARB

## 2023-04-23 NOTE — PLAN OF CARE
Problem: Occupational Therapy  Goal: Occupational Therapy Goal  Description: Goals to be met by: 4/30/23    Patient will increase functional independence with ADLs by performing:    LE Dressing with Moderate Assistance.  Grooming while standing with Contact Guard Assistance.  Toileting from bedside commode with Minimal Assistance for hygiene and clothing management.   Supine to sit with Moderate Assistance.  Step transfer with Moderate Assistance  Toilet transfer to bedside commode with Moderate Assistance.    Outcome: Ongoing, Progressing

## 2023-04-23 NOTE — ASSESSMENT & PLAN NOTE
A&O x3 on admission with waxing/waning per  at the bedside.  says she has been largely bed bound since discharge, but her waxing confusion was concerning to him. Patients mentation significantly improved after stopping cefepime.    - Repeat CT Head with no acute change  - EEG r/o seizure  - TSH, B12, B1, Vit E, Ammonia, RPR  - Cefepime changed to Zosyn  - F/u blood and Urine cultures

## 2023-04-23 NOTE — PROGRESS NOTES
[facility] Medicine  Progress Note    Patient Name: Nelsy Marino  MRN: [id number]  Patient Class: IP- Inpatient   Admission Date: 4/20/2023  Length of Stay: 1 days  Attending Physician: Kay Benavidez MD  Primary Care Provider: Juan F Garay        Subjective:     Principal Problem:PAD (peripheral artery disease)        HPI:  This is an 82-year-old female with a past medical history of severe PAD, hypertension, type 2 diabetes, HFpEF (EF 65%), L femoral bypass and L leg flap, R toe ampuations 2 months prior with presenting after  noted waxing/waning confusion since her discharge from hospital two weeks ago. History largely provided by  at bedside as patient is intermittently somnolent. She was discharged on IV Cefepime and Micafungin for her R foot amputation and patient reports adherence with regimen at home. He grew concerned that int he past two weeks she had worsening LLE pain and darkened skin, which is what happened before her R toes were amputated. His niece contacted a nurse who recommneded ED evaluation. No fevers, chills, HA, chest pain, SOB, N/V/D, abdominal pain.    Pt receives  nurse aide, PT/OT since hospital DC and ID very recently made a referral to podiatry and wound care, but appointments not yet made.     In the ED patient was hemodynamically stable and hypertensive, intermittently on RA to 2L NC. Leukocytosis to 13, Hgb 7.5, Cr 2.1, Albumin 2.1, BCx in process, CT head and CXR wnl. Started on BS Abx with continued Cefepime, [place], started on Vanc pending Bcx, US LE arterial with diminished flow throughout the left lower extremity. Monophasic waveforms suggesting atherosclerotic disease. Admitted to  for LOLY and Podiatry/Wound care.       Overview/Hospital Course:  Admitted to hospital medicine for LOLY and left foot pain. Cr on admission 2.1 (bl.8-1.5), improved with IVF. Vascular surgery consulted, recommended repeat ARCHIE and left foot arterial doppler.  ID consulted, patient was receiving cefepime and micafungin outpatient per ID following R foot transmetatarsal amputation. Started on vanc, cefepime, and micafungin. Podiatry and wound care consulted. Patient more lethargic 4/22 although able to follow commands and move extremities on exam. With concern for cefepime toxicity, ID removed cefepime and vancomycin. Micafungin and zosyn continued. EEG and CTH ordered. CTH no acute abnormalities. Patient significantly improved following d/c Cefepime. Continued on IVF for LOLY and rivas placed for urinary retention. SLP consulted recommended pureed dysphagia diet.      Interval History:  Mentation significantly improved since yesterday.CTH negative. Delay in receiving blood bc positive indirect avani. pRBC transfused. Rivas placed for 600 cc urinary retention. Ivf restarted for LOLY    Past Medical History:   Diagnosis Date    CHF (congestive heart failure)     Diabetes mellitus     DVT (deep venous thrombosis)     Hypertension     Miscarriage        Past Surgical History:   Procedure Laterality Date     femoral vascular surgery       ANGIOGRAPHY OF LOWER EXTREMITY Right 2/23/2023    Procedure: Angiogram Extremity Unilateral;  Surgeon: Danny Dunbar MD;  Location: [facility];  Service: Vascular;  Laterality: Right;  RN PHONE PREOP 2/20/2023----CK CONSENT    ANGIOGRAPHY OF LOWER EXTREMITY Right 4/4/2023    Procedure: Angiogram Extremity Unilateral - possible endovascular intervention;  Surgeon: René Simms MD;  Location: [address];  Service: Vascular;  Laterality: Right;  19.6 mins.  740.81 mGy  73.3399 Gy.cm  58ml dye  local - 1ml    CREATION OF FEMORAL-FEMORAL ARTERY BYPASS WITH GRAFT Bilateral 3/2/2023    Procedure: CREATION, BYPASS, ARTERIAL, FEMORAL TO FEMORAL, USING GRAFT, LEFT FEMORAL ENDARTERECTOMY, LEFT ILIAC ARTERY STENT PLACEMENT, RIGHT LOWER EXTREMITY ANGIOGRAM AND RIGHT SFA  ANGIOPLASTY;  Surgeon: Danny Dunbar MD;  Location: [facility]  OR 2ND FLR;  Service: Vascular;  Laterality: Bilateral;   744.47 MGY  175.36 Gycm  Flouro time 20.5 mins      CREATION OF ILIOFEMORAL ARTERY BYPASS Right 3/20/2019    Procedure: CREATION, BYPASS, ARTERIAL, ILIAC TO FEMORAL, RIGHT LOWER EXTREMITY, RIGHT PROFUNDAPLASTY;  Surgeon: Danny Dunbar MD;  Location: [facility];  Service: Vascular;  Laterality: Right;  11:00AM START PER NAZANIN RICHARDS PREOP 3/13/2019  ACT'S, CELL SAVER, GRAFTS, BOOK WATER---NEED H/P  CONSENT IN AM------HGBA1C    CREATION OF MUSCLE ROTATIONAL FLAP Left 3/31/2023    Procedure: CREATION, FLAP, MUSCLE ROTATION;  Surgeon: Brandon Smiley MD;  [facility];  Service: Plastics;  Laterality: Left;    ESOPHAGOGASTRODUODENOSCOPY N/A 3/17/2023    Procedure: EGD (ESOPHAGOGASTRODUODENOSCOPY);  Surgeon: Gee Rodriguez MD;  [facility]  Service: Endoscopy;  Laterality: N/A;    ESOPHAGOGASTRODUODENOSCOPY N/A 4/11/2023    Procedure: EGD (ESOPHAGOGASTRODUODENOSCOPY);  Surgeon: Nazanin Kaiser MD;  [facility]  Service: Endoscopy;  Laterality: N/A;    FOOT AMPUTATION THROUGH METATARSAL Right 3/4/2023    Procedure: AMPUTATION, FOOT, TRANSMETATARSAL;  Surgeon: Benjamin Godfrey DPM;  [address];  Service: Podiatry;  Laterality: Right;    HYSTERECTOMY      ?unsure cervix present    INCISION AND DRAINAGE Right 4/12/2019    Procedure: Incision and Drainage - R groin washout, possible muscle flap;  Surgeon: Danny Dunbar MD;  [facility];  Service: Vascular;  Laterality: Right;  groin washout    INCISION AND DRAINAGE OF GROIN Right 5/3/2019    Procedure: Incision and Drainage R groin;  Surgeon: Danny Dunbar MD;  [facility];  Service: Vascular;  Laterality: Right;    WOUND DEBRIDEMENT Left 3/31/2023    Procedure: DEBRIDEMENT, WOUND;  Surgeon: René Simms MD;  [facility];  Service: Vascular;  Laterality: Left;  "      Review of patient's allergies indicates:   Allergen Reactions    Motrin [ibuprofen] Itching       No current facility-administered medications on file prior to encounter.     Current Outpatient Medications on File Prior to Encounter   Medication Sig    amlodipine (NORVASC) 10 MG tablet Take 10 mg by mouth once daily.    aspirin (ECOTRIN) 81 MG EC tablet Take 81 mg by mouth once daily.    atorvastatin (LIPITOR) 40 MG tablet Take 1 tablet (40 mg total) by mouth every evening.    BLOOD SUGAR DIAGNOSTIC (TRUE METRIX GLUCOSE TEST STRIP MISC) by Misc.(Non-Drug; Combo Route) route.    carvediloL (COREG) 12.5 MG tablet Take 12.5 mg by mouth 2 (two) times daily.    cephALEXin (KEFLEX) 500 MG capsule Take 2 capsules (1,000 mg total) by mouth every 12 (twelve) hours.    clopidogreL (PLAVIX) 75 mg tablet Take 1 tablet (75 mg total) by mouth once daily.    DULoxetine (CYMBALTA) 30 MG capsule Take 1 capsule (30 mg total) by mouth 2 (two) times daily.    furosemide (LASIX) 20 MG tablet Take 1 tablet (20 mg total) by mouth once daily.    insulin aspart protamine-insulin aspart (NOVOLOG MIX 70-30FLEXPEN U-100) 100 unit/mL (70-30) InPn pen Inject 12 Units into the skin 2 (two) times daily before meals.    insulin syringe-needle,dispos. 0.3 mL 30 gauge x 5/16" Syrg by Misc.(Non-Drug; Combo Route) route.    losartan-hydrochlorothiazide 100-25 mg (HYZAAR) 100-25 mg per tablet Take 1 tablet by mouth once daily.    metformin (GLUCOPHAGE) 1000 MG tablet Take 1,000 mg by mouth 2 (two) times daily with meals.    ondansetron (ZOFRAN) 8 MG tablet     oxyCODONE (ROXICODONE) 5 MG immediate release tablet Take 1 tablet (5 mg total) by mouth every 6 (six) hours as needed for Pain.    pantoprazole (PROTONIX) 40 MG tablet Take 1 tablet (40 mg total) by mouth 2 (two) times daily.    pen needle, diabetic 32 gauge x 5/32" Ndle Inject 1 each into the skin 4 (four) times daily.     Family History       Problem Relation (Age of " Onset)    Diabetes Father    Hypertension Father          Tobacco Use    Smoking status: Former     Types: Cigarettes     Quit date: 1980     Years since quittin.3    Smokeless tobacco: Never   Substance and Sexual Activity    Alcohol use: No    Drug use: No    Sexual activity: Never     Review of Systems   Constitutional:  Negative for activity change, appetite change and fever.   HENT: Negative.     Eyes: Negative.    Respiratory:  Negative for cough, shortness of breath and wheezing.    Cardiovascular:  Negative for chest pain and leg swelling.   Gastrointestinal:  Negative for abdominal pain, constipation, diarrhea, nausea and vomiting.   Endocrine: Negative.    Genitourinary: Negative.    Musculoskeletal:  Positive for arthralgias and myalgias.   Skin:  Positive for color change and wound. Negative for rash.   Allergic/Immunologic: Negative.    Neurological:  Negative for headaches.   Hematological: Negative.    Psychiatric/Behavioral:  Positive for confusion.    Objective:     Vital Signs (Most Recent):  Temp: 97.3 °F (36.3 °C) (23 0812)  Pulse: 86 (23 1042)  Resp: 18 (23 0926)  BP: 137/70 (23 0812)  SpO2: 96 % (23 1042)   Vital Signs (24h Range):  Temp:  [97.2 °F (36.2 °C)-99.7 °F (37.6 °C)] 97.3 °F (36.3 °C)  Pulse:  [85-91] 86  Resp:  [16-20] 18  SpO2:  [95 %-97 %] 96 %  BP: (126-145)/(60-89) 137/70     Weight: 68 kg (150 lb)  Body mass index is 24.96 kg/m².    Physical Exam  Vitals and nursing note reviewed.   Constitutional:       General: She is not in acute distress.     Appearance: She is ill-appearing.   HENT:      Head: Normocephalic and atraumatic.      Mouth/Throat:      Mouth: Mucous membranes are dry.      Pharynx: Oropharynx is clear.   Eyes:      Extraocular Movements: Extraocular movements intact.      Conjunctiva/sclera: Conjunctivae normal.   Cardiovascular:      Rate and Rhythm: Regular rhythm. Tachycardia present.      Pulses: Normal pulses.    Pulmonary:      Effort: Pulmonary effort is normal. No respiratory distress.      Breath sounds: No wheezing or rales.   Abdominal:      Palpations: Abdomen is soft.      Tenderness: There is no abdominal tenderness. There is no guarding.   Musculoskeletal:      Cervical back: Normal range of motion and neck supple.      Right lower leg: No edema.      Left lower leg: No edema.   Skin:     General: Skin is warm.      Findings: Lesion (R heel, L dorsum with darkened skin) present.   Neurological:      General: No focal deficit present.      Mental Status: Mental status is at baseline.      Comments: Following commands  Able to respond yes/no          Significant Labs: All pertinent labs within the past 24 hours have been reviewed.    Significant Imaging: I have reviewed all pertinent imaging results/findings within the past 24 hours.      Assessment/Plan:      * PAD (peripheral artery disease)  Recently discharged home with IV Cefepime 2g Q12 hours + IV Micafungin 100mg Q24 hours for estimated end of therapy date of 5/16/2023. For R toe amputation. Patient's  reports adherence with IV Abx. L foot with darkening skin changes, which alarmed  as this was how skin presented prior to toe amputations of R foot. RLE warm to touch with L US Lower Extremity Arteries demonstrating diminished flow throughout the left lower extremity. Monophasic waveforms suggesting atherosclerotic disease.     - Podiatry and Vascular Surgery following; appreciate recs  - Continue ASA, atorvastatin, Plavix  - Continue Micafungin and Zosyn per prior ID recs    Debility  - PT/OT reconsulted  - Fall precautions  - Established with  PT/OT/nurse aide, will forego re-consulting PT/OT at this time, do not expect SNF/LTAC placement      Skin ulcer of right heel, limited to breakdown of skin  See PVD for plan    - Wound care and podiatry consulted with appreciated mgmt recs      LOLY (acute kidney injury)  Patient with acute kidney  injury likely due to IVVD/dehydration LOLY is currently worsening. Labs reviewed- Renal function/electrolytes with Estimated Creatinine Clearance: 18.6 mL/min (A) (based on SCr of 2.1 mg/dL (H)). according to latest data. Monitor urine output and serial BMP and adjust therapy as needed. Avoid nephrotoxins and renally dose meds for GFR listed above.     Cr 2.1 (baseline 0.8-1.5).  Patient dry on physical exam.  Poor oral intake, most likely prerenal in origin.    Recent Labs   Lab 04/22/23  0400 04/23/23  0400    136   K 3.7 3.5    106   CO2 20* 21*   BUN 49* 46*   CREATININE 1.6* 2.1*     Improved with fluids   - Continue IVF  - Paniagua placed after patient retaining on bladder scan  - f/u retroperitoneal u/s  - CMP qd, trend Cr  - strict I/O  - daily weights  - renally dose all medications  - avoid nephrotoxic agents, NSAIDs, IV contrast, ACE/ARB      Acute on chronic anemia  Patient with Hgb 7.5 on admission with baseline 8.6-11. Suspected 2/2 foot wounds and JAMES based on prior studies    - CBC qd, trend  - transfuse pRBC for Hb < 7 g/dL; transfuse for < 8 if ACS or active bleeding and unresponsive to volume resuscitation with evidence of end-organ ischemia/>20% blood volume loss with HD instablity/>30% blood volume loss      Acute encephalopathy  A&O x3 on admission with waxing/waning per  at the bedside.  says she has been largely bed bound since discharge, but her waxing confusion was concerning to him. Patients mentation significantly improved after stopping cefepime.    - Repeat CT Head with no acute change  - EEG r/o seizure  - TSH, B12, B1, Vit E, Ammonia, RPR  - Cefepime changed to Zosyn  - F/u blood and Urine cultures      S/P transmetatarsal amputation of foot, right  Appreciated evaluation by Podiatry      Diastolic CHF, chronic  Patient is identified as having Diastolic (HFpEF) heart failure that is Chronic. CHF is currently controlled. Latest ECHO performed and demonstrates-  Results for orders placed during the hospital encounter of 02/27/23    Echo    Interpretation Summary  · The estimated ejection fraction is 65%.  · The left ventricle is normal in size with normal systolic function.  · Grade II left ventricular diastolic dysfunction.  · Normal right ventricular size with normal right ventricular systolic function.  · Mild left atrial enlargement.  · Mild-to-moderate mitral regurgitation.  · There is pulmonary hypertension.  · The estimated PA systolic pressure is 55 mmHg.  · Intermediate central venous pressure (8 mmHg).  . Continue Beta Blocker and Furosemide and monitor clinical status closely. Monitor on telemetry. Patient is off CHF pathway.  Monitor strict Is&Os and daily weights.  Place on fluid restriction of 1.5 L. Continue to stress to patient importance of self efficacy and  on diet for CHF. Last BNP reviewed- and noted below No results for input(s): BNP, BNPTRIAGEBLO in the last 168 hours..    - Holding home Lasix in setting of LOLY  - Holding Hyzaar (HCTZ) in setting of LOLY  - Patient breathing comfortably on RA, no signs of volume overload      Type 2 diabetes mellitus with chronic kidney disease, with long-term current use of insulin  Patient's FSGs are uncontrolled due to hyperglycemia on current medication regimen.  Last A1c reviewed-   Lab Results   Component Value Date    HGBA1C 8.2 (H) 02/28/2023     Most recent fingerstick glucose reviewed-   Recent Labs   Lab 04/22/23  1559 04/22/23  2117 04/23/23  1120   POCTGLUCOSE 135* 125* 151*     Current correctional scale  Low  Maintain anti-hyperglycemic dose as follows-   Antihyperglycemics (From admission, onward)    Start     Stop Route Frequency Ordered    04/21/23 0355  insulin aspart U-100 pen 0-5 Units         -- SubQ Before meals & nightly PRN 04/21/23 0255        Hold Oral hypoglycemics while patient is in the hospital.    Essential hypertension  - Continue home Coreg, amlodipine  - hold Hyzaar in setting  of LOLY        VTE Risk Mitigation (From admission, onward)         Ordered     heparin (porcine) injection 5,000 Units  Every 8 hours         04/21/23 0254     IP VTE HIGH RISK PATIENT  Once         04/21/23 0254     Place sequential compression device  Until discontinued         04/21/23 0254                Discharge Planning   JUAN ALBERTO: 4/25/2023     Code Status: Full Code   Is the patient medically ready for discharge?: No    Reason for patient still in hospital (select all that apply): Patient trending condition, Consult recommendations and PT / OT recommendations  Discharge Plan A: Home Health (with home infusion)                  Boris Mckeon DO  Department of Hospital Medicine   [facility]

## 2023-04-23 NOTE — PROGRESS NOTES
[facility]  Vascular Surgery  Progress Note    Patient Name: Nelsy Marino  MRN: [id number]  Admission Date: 4/20/2023  Primary Care Provider: Juan F Garay    Subjective:     Interval History: Patient seen and examined. Alert and conversant this morning. Continues to endorse left foot pain. Doppler signals stable.     Post-Op Info:  * No surgery found *           Medications:  Continuous Infusions:   lactated ringers       Scheduled Meds:   amLODIPine  10 mg Oral Daily    aspirin  81 mg Oral Daily    atorvastatin  40 mg Oral QHS    carvediloL  12.5 mg Oral BID    clopidogreL  75 mg Oral Daily    DULoxetine  30 mg Oral BID    heparin (porcine)  5,000 Units Subcutaneous Q8H    micafungin (MYCAMINE) IVPB  100 mg Intravenous Q24H    mupirocin   Nasal BID    pantoprazole  40 mg Oral BID    piperacillin-tazobactam (ZOSYN) IVPB  4.5 g Intravenous Q8H     PRN Meds:sodium chloride, dextrose 10%, dextrose 10%, dextrose, dextrose, glucagon (human recombinant), HYDROmorphone, insulin aspart U-100, naloxone, oxyCODONE, oxyCODONE, sodium chloride 0.9%     Objective:     Vital Signs (Most Recent):  Temp: 97.3 °F (36.3 °C) (04/23/23 0812)  Pulse: 87 (04/23/23 0812)  Resp: 18 (04/23/23 0926)  BP: 137/70 (04/23/23 0812)  SpO2: 96 % (04/23/23 0812) Vital Signs (24h Range):  Temp:  [97.2 °F (36.2 °C)-99.7 °F (37.6 °C)] 97.3 °F (36.3 °C)  Pulse:  [81-91] 87  Resp:  [15-20] 18  SpO2:  [95 %-96 %] 96 %  BP: (126-145)/(60-89) 137/70         Physical Exam  Vitals and nursing note reviewed.   Constitutional:       Appearance: She is ill-appearing.   HENT:      Nose: Nose normal.   Cardiovascular:      Rate and Rhythm: Normal rate and regular rhythm.      Comments: Biphasic doppler signals to left AT and PT pulses  Pulmonary:      Effort: Pulmonary effort is normal. No respiratory distress.   Abdominal:      General: Abdomen is flat. There is no distension.      Palpations: Abdomen is soft.   Skin:     Comments:  Right TMA with ACE wrap in place  Left foot with skin discoloration to the forefoot, worst between the great and second toe  Left groin with dressing in place; granulation tissue at wound bed   Neurological:      Mental Status: She is alert.       Significant Labs:  CBC:   Recent Labs   Lab 04/23/23  0400   WBC 10.03   RBC 2.58*   HGB 6.5*   HCT 21.8*      MCV 85   MCH 25.2*   MCHC 29.8*     CMP:   Recent Labs   Lab 04/23/23  0400   *   CALCIUM 8.9   ALBUMIN 1.9*   PROT 6.7      K 3.5   CO2 21*      BUN 46*   CREATININE 2.1*   ALKPHOS 67   ALT 5*   AST 19   BILITOT 0.3       Significant Diagnostics:  I have reviewed all pertinent imaging results/findings within the past 24 hours.    Assessment/Plan:     * PAD (peripheral artery disease)  Nelsy Marino is an 82yoF with known PAD s/p fem-fem and fem-SMA bypass in March of this year. This was complicated by wound dehiscence, exposed graft requiring a right rectus femoris flap. The patient returns to the hospital with complaints of persistent left foot pain, left foot discoloration. Vascular surgery consulted for evaluation.     - patient seen and examined  - labs and imaging reviewed   - arterial US reviewed  - biphasic doppler signals to left AT and PT   - repeat ARCHIE and toe pressures reviewed   - not much more we can optimize from a surgical perspective  - appreciate podiatry and wound care  - recommend plastic surgery consult for right groin rectus femoris flap/wound evaluation  - continue aspirin, statin, plavix  - PT/OT  - please call with questions          Freddy Murillo MD  Vascular Surgery  [facility]

## 2023-04-23 NOTE — ASSESSMENT & PLAN NOTE
- PT/OT reconsulted  - Fall precautions  - Established with  PT/OT/nurse aide, will forego re-consulting PT/OT at this time, do not expect SNF/LTAC placement

## 2023-04-23 NOTE — SUBJECTIVE & OBJECTIVE
Interval History: ". Encephalopathy still present. Cefepime transitioned to Zosyn on 4/22. Participated with PT/OT to sit at edge of bed.    Review of Systems   Unable to perform ROS: Mental status change   Objective:     Vital Signs (Most Recent):  Temp: 97.5 °F (36.4 °C) (04/23/23 1543)  Pulse: 86 (04/23/23 1042)  Resp: 18 (04/23/23 0926)  BP: 137/70 (04/23/23 0812)  SpO2: 96 % (04/23/23 1042) Vital Signs (24h Range):  Temp:  [97.2 °F (36.2 °C)-99.7 °F (37.6 °C)] 97.5 °F (36.4 °C)  Pulse:  [85-90] 86  Resp:  [16-20] 18  SpO2:  [95 %-97 %] 96 %  BP: (126-145)/(60-89) 137/70     Weight: 68 kg (150 lb)  Body mass index is 24.96 kg/m².    Estimated Creatinine Clearance: 18.6 mL/min (A) (based on SCr of 2.1 mg/dL (H)).    Physical Exam  Vitals and nursing note reviewed.   Constitutional:       Appearance: She is well-developed.   HENT:      Head: Normocephalic and atraumatic.      Right Ear: External ear normal.      Left Ear: External ear normal.   Eyes:      General: No scleral icterus.        Right eye: No discharge.         Left eye: No discharge.      Conjunctiva/sclera: Conjunctivae normal.   Cardiovascular:      Rate and Rhythm: Normal rate and regular rhythm.      Heart sounds: Normal heart sounds. No murmur heard.    No friction rub. No gallop.   Pulmonary:      Effort: Pulmonary effort is normal. No respiratory distress.      Breath sounds: Normal breath sounds. No stridor. No wheezing or rales.   Abdominal:      General: Bowel sounds are normal.   Musculoskeletal:         General: No tenderness. Normal range of motion.   Skin:     General: Skin is warm and dry.      Comments: Dark skin discoloration over the dorsal aspect of the left foot. Right foot covered with compression dressing.   Neurological:      Mental Status: She is lethargic.      Comments: Does not answer questions. Follows commands intermittently.       Significant Labs: Blood Culture:   Recent Labs   Lab 03/08/23  2479 03/10/23  0942  03/10/23  0943 03/29/23 2009 04/20/23  2057   LABBLOO No growth after 5 days.  No growth after 5 days. No growth after 5 days. No growth after 5 days. No growth after 5 days.  No growth after 5 days. No Growth to date  No Growth to date  No Growth to date  No Growth to date  No Growth to date  No Growth to date     BMP:   Recent Labs   Lab 04/23/23  0400   *      K 3.5      CO2 21*   BUN 46*   CREATININE 2.1*   CALCIUM 8.9   MG 1.6     CBC:   Recent Labs   Lab 04/22/23  0400 04/22/23  2000 04/23/23  0400   WBC 9.16 10.34 10.03   HGB 6.6* 6.6* 6.5*   HCT 22.0* 21.6* 21.8*    377 391     Urine Culture: No results for input(s): LABURIN in the last 4320 hours.  Urine Studies:   Recent Labs   Lab 04/23/23  1116   COLORU Straw  Straw   APPEARANCEUA Cloudy*  Cloudy*   PHUR 6.0  6.0   SPECGRAV 1.015  1.015   PROTEINUA 1+*  1+*   GLUCUA Negative  Negative   KETONESU Trace*  Trace*   BILIRUBINUA Negative  Negative   OCCULTUA 1+*  1+*   NITRITE Negative  Negative   LEUKOCYTESUR Negative  Negative   RBCUA 4   WBCUA 0   BACTERIA Rare   SQUAMEPITHEL 1   HYALINECASTS 0     Wound Culture:   Recent Labs   Lab 03/04/23  1141 03/10/23  1340 03/31/23  0805 03/31/23  0809   LABAERO No growth PSEUDOMONAS FLUORESCENS GROUP  Few  * FELI ALBICANS  Rare  * FELI GLABRATA  Rare  *       Significant Imaging: I have reviewed all pertinent imaging results/findings within the past 24 hours.

## 2023-04-23 NOTE — ASSESSMENT & PLAN NOTE
Patient's FSGs are uncontrolled due to hyperglycemia on current medication regimen.  Last A1c reviewed-   Lab Results   Component Value Date    HGBA1C 8.2 (H) 02/28/2023     Most recent fingerstick glucose reviewed-   Recent Labs   Lab 04/22/23  1559 04/22/23  2117 04/23/23  1120   POCTGLUCOSE 135* 125* 151*     Current correctional scale  Low  Maintain anti-hyperglycemic dose as follows-   Antihyperglycemics (From admission, onward)    Start     Stop Route Frequency Ordered    04/21/23 0355  insulin aspart U-100 pen 0-5 Units         -- SubQ Before meals & nightly PRN 04/21/23 0255        Hold Oral hypoglycemics while patient is in the hospital.

## 2023-04-24 ENCOUNTER — DOCUMENTATION ONLY (OUTPATIENT)
Dept: NEUROLOGY | Facility: CLINIC | Age: 83
End: 2023-04-24

## 2023-04-24 LAB
25(OH)D3+25(OH)D2 SERPL-MCNC: 20 NG/ML (ref 30–96)
ALBUMIN SERPL BCP-MCNC: 1.8 G/DL (ref 3.5–5.2)
ALP SERPL-CCNC: 65 U/L (ref 55–135)
ALT SERPL W/O P-5'-P-CCNC: <5 U/L (ref 10–44)
ANION GAP SERPL CALC-SCNC: 8 MMOL/L (ref 8–16)
AST SERPL-CCNC: 17 U/L (ref 10–40)
BACTERIA #/AREA URNS AUTO: ABNORMAL /HPF
BASOPHILS # BLD AUTO: 0.02 K/UL (ref 0–0.2)
BASOPHILS # BLD AUTO: 0.02 K/UL (ref 0–0.2)
BASOPHILS NFR BLD: 0.2 % (ref 0–1.9)
BASOPHILS NFR BLD: 0.2 % (ref 0–1.9)
BILIRUB SERPL-MCNC: 0.4 MG/DL (ref 0.1–1)
BILIRUB UR QL STRIP: NEGATIVE
BUN SERPL-MCNC: 54 MG/DL (ref 8–23)
CA-I BLDV-SCNC: 1.25 MMOL/L (ref 1.06–1.42)
CALCIUM SERPL-MCNC: 9.2 MG/DL (ref 8.7–10.5)
CHLORIDE SERPL-SCNC: 108 MMOL/L (ref 95–110)
CK SERPL-CCNC: 22 U/L (ref 20–180)
CLARITY UR REFRACT.AUTO: ABNORMAL
CO2 SERPL-SCNC: 24 MMOL/L (ref 23–29)
COLOR UR AUTO: ABNORMAL
CREAT SERPL-MCNC: 2.5 MG/DL (ref 0.5–1.4)
CREAT UR-MCNC: 89 MG/DL (ref 15–325)
DIFFERENTIAL METHOD: ABNORMAL
DIFFERENTIAL METHOD: ABNORMAL
EOSINOPHIL # BLD AUTO: 0.5 K/UL (ref 0–0.5)
EOSINOPHIL # BLD AUTO: 0.6 K/UL (ref 0–0.5)
EOSINOPHIL NFR BLD: 4.6 % (ref 0–8)
EOSINOPHIL NFR BLD: 5.5 % (ref 0–8)
ERYTHROCYTE [DISTWIDTH] IN BLOOD BY AUTOMATED COUNT: 14.9 % (ref 11.5–14.5)
ERYTHROCYTE [DISTWIDTH] IN BLOOD BY AUTOMATED COUNT: 15 % (ref 11.5–14.5)
EST. GFR  (NO RACE VARIABLE): 18.7 ML/MIN/1.73 M^2
GLUCOSE SERPL-MCNC: 145 MG/DL (ref 70–110)
GLUCOSE UR QL STRIP: NEGATIVE
HCT VFR BLD AUTO: 21.5 % (ref 37–48.5)
HCT VFR BLD AUTO: 24.1 % (ref 37–48.5)
HGB BLD-MCNC: 6.6 G/DL (ref 12–16)
HGB BLD-MCNC: 7.4 G/DL (ref 12–16)
HGB UR QL STRIP: ABNORMAL
HYALINE CASTS UR QL AUTO: 0 /LPF
IMM GRANULOCYTES # BLD AUTO: 0.02 K/UL (ref 0–0.04)
IMM GRANULOCYTES # BLD AUTO: 0.14 K/UL (ref 0–0.04)
IMM GRANULOCYTES NFR BLD AUTO: 0.2 % (ref 0–0.5)
IMM GRANULOCYTES NFR BLD AUTO: 1.4 % (ref 0–0.5)
KETONES UR QL STRIP: ABNORMAL
LEUKOCYTE ESTERASE UR QL STRIP: ABNORMAL
LYMPHOCYTES # BLD AUTO: 1.5 K/UL (ref 1–4.8)
LYMPHOCYTES # BLD AUTO: 1.6 K/UL (ref 1–4.8)
LYMPHOCYTES NFR BLD: 14.4 % (ref 18–48)
LYMPHOCYTES NFR BLD: 15 % (ref 18–48)
MAGNESIUM SERPL-MCNC: 1.6 MG/DL (ref 1.6–2.6)
MCH RBC QN AUTO: 26.1 PG (ref 27–31)
MCH RBC QN AUTO: 26.2 PG (ref 27–31)
MCHC RBC AUTO-ENTMCNC: 30.7 G/DL (ref 32–36)
MCHC RBC AUTO-ENTMCNC: 30.7 G/DL (ref 32–36)
MCV RBC AUTO: 85 FL (ref 82–98)
MCV RBC AUTO: 85 FL (ref 82–98)
MICROSCOPIC COMMENT: ABNORMAL
MONOCYTES # BLD AUTO: 0.5 K/UL (ref 0.3–1)
MONOCYTES # BLD AUTO: 0.5 K/UL (ref 0.3–1)
MONOCYTES NFR BLD: 4.5 % (ref 4–15)
MONOCYTES NFR BLD: 5.4 % (ref 4–15)
NEUTROPHILS # BLD AUTO: 7.2 K/UL (ref 1.8–7.7)
NEUTROPHILS # BLD AUTO: 8.3 K/UL (ref 1.8–7.7)
NEUTROPHILS NFR BLD: 72.5 % (ref 38–73)
NEUTROPHILS NFR BLD: 76.1 % (ref 38–73)
NITRITE UR QL STRIP: NEGATIVE
NRBC BLD-RTO: 0 /100 WBC
NRBC BLD-RTO: 0 /100 WBC
PH UR STRIP: 6 [PH] (ref 5–8)
PHOSPHATE SERPL-MCNC: 3.7 MG/DL (ref 2.7–4.5)
PLATELET # BLD AUTO: 281 K/UL (ref 150–450)
PLATELET # BLD AUTO: 317 K/UL (ref 150–450)
PMV BLD AUTO: 10.4 FL (ref 9.2–12.9)
PMV BLD AUTO: 10.5 FL (ref 9.2–12.9)
POCT GLUCOSE: 154 MG/DL (ref 70–110)
POCT GLUCOSE: 167 MG/DL (ref 70–110)
POCT GLUCOSE: 199 MG/DL (ref 70–110)
POCT GLUCOSE: 213 MG/DL (ref 70–110)
POTASSIUM SERPL-SCNC: 3.8 MMOL/L (ref 3.5–5.1)
PROT SERPL-MCNC: 6.6 G/DL (ref 6–8.4)
PROT UR QL STRIP: ABNORMAL
PROT UR-MCNC: 105 MG/DL (ref 0–15)
PROT/CREAT UR: 1.18 MG/G{CREAT} (ref 0–0.2)
PTH-INTACT SERPL-MCNC: 64.7 PG/ML (ref 9–77)
RBC # BLD AUTO: 2.52 M/UL (ref 4–5.4)
RBC # BLD AUTO: 2.83 M/UL (ref 4–5.4)
RBC #/AREA URNS AUTO: >100 /HPF (ref 0–4)
RPR SER QL: NORMAL
SODIUM SERPL-SCNC: 140 MMOL/L (ref 136–145)
SODIUM UR-SCNC: 50 MMOL/L (ref 20–250)
SP GR UR STRIP: 1.01 (ref 1–1.03)
URN SPEC COLLECT METH UR: ABNORMAL
UUN UR-MCNC: 566 MG/DL (ref 140–1050)
WBC # BLD AUTO: 10.87 K/UL (ref 3.9–12.7)
WBC # BLD AUTO: 9.95 K/UL (ref 3.9–12.7)
WBC #/AREA URNS AUTO: >100 /HPF (ref 0–5)

## 2023-04-24 PROCEDURE — 11000001 HC ACUTE MED/SURG PRIVATE ROOM

## 2023-04-24 PROCEDURE — 99233 SBSQ HOSP IP/OBS HIGH 50: CPT | Mod: ,,, | Performed by: NURSE PRACTITIONER

## 2023-04-24 PROCEDURE — 85025 COMPLETE CBC W/AUTO DIFF WBC: CPT

## 2023-04-24 PROCEDURE — 99233 PR SUBSEQUENT HOSPITAL CARE,LEVL III: ICD-10-PCS | Mod: ,,, | Performed by: NURSE PRACTITIONER

## 2023-04-24 PROCEDURE — 87086 URINE CULTURE/COLONY COUNT: CPT

## 2023-04-24 PROCEDURE — 25000003 PHARM REV CODE 250: Performed by: STUDENT IN AN ORGANIZED HEALTH CARE EDUCATION/TRAINING PROGRAM

## 2023-04-24 PROCEDURE — 99233 SBSQ HOSP IP/OBS HIGH 50: CPT | Mod: ,,, | Performed by: INTERNAL MEDICINE

## 2023-04-24 PROCEDURE — 25000003 PHARM REV CODE 250

## 2023-04-24 PROCEDURE — 95813 EEG EXTND MNTR 61-119 MIN: CPT | Mod: 26,,, | Performed by: STUDENT IN AN ORGANIZED HEALTH CARE EDUCATION/TRAINING PROGRAM

## 2023-04-24 PROCEDURE — 84156 ASSAY OF PROTEIN URINE: CPT

## 2023-04-24 PROCEDURE — 82550 ASSAY OF CK (CPK): CPT

## 2023-04-24 PROCEDURE — 86334 IMMUNOFIX E-PHORESIS SERUM: CPT | Mod: 26,,, | Performed by: PATHOLOGY

## 2023-04-24 PROCEDURE — 97535 SELF CARE MNGMENT TRAINING: CPT

## 2023-04-24 PROCEDURE — 83970 ASSAY OF PARATHORMONE: CPT | Performed by: STUDENT IN AN ORGANIZED HEALTH CARE EDUCATION/TRAINING PROGRAM

## 2023-04-24 PROCEDURE — 92526 ORAL FUNCTION THERAPY: CPT

## 2023-04-24 PROCEDURE — 82652 VIT D 1 25-DIHYDROXY: CPT | Performed by: STUDENT IN AN ORGANIZED HEALTH CARE EDUCATION/TRAINING PROGRAM

## 2023-04-24 PROCEDURE — 84300 ASSAY OF URINE SODIUM: CPT

## 2023-04-24 PROCEDURE — 86900 BLOOD TYPING SEROLOGIC ABO: CPT | Performed by: INTERNAL MEDICINE

## 2023-04-24 PROCEDURE — 25000003 PHARM REV CODE 250: Performed by: INTERNAL MEDICINE

## 2023-04-24 PROCEDURE — 82306 VITAMIN D 25 HYDROXY: CPT | Performed by: STUDENT IN AN ORGANIZED HEALTH CARE EDUCATION/TRAINING PROGRAM

## 2023-04-24 PROCEDURE — 63600175 PHARM REV CODE 636 W HCPCS: Performed by: INTERNAL MEDICINE

## 2023-04-24 PROCEDURE — 86334 IMMUNOFIX E-PHORESIS SERUM: CPT | Performed by: STUDENT IN AN ORGANIZED HEALTH CARE EDUCATION/TRAINING PROGRAM

## 2023-04-24 PROCEDURE — 85025 COMPLETE CBC W/AUTO DIFF WBC: CPT | Mod: 91

## 2023-04-24 PROCEDURE — 63600175 PHARM REV CODE 636 W HCPCS

## 2023-04-24 PROCEDURE — 82523 COLLAGEN CROSSLINKS: CPT | Performed by: STUDENT IN AN ORGANIZED HEALTH CARE EDUCATION/TRAINING PROGRAM

## 2023-04-24 PROCEDURE — 84540 ASSAY OF URINE/UREA-N: CPT

## 2023-04-24 PROCEDURE — 95813 PR EEG, EXTENDED, 61-119 MINS: ICD-10-PCS | Mod: 26,,, | Performed by: STUDENT IN AN ORGANIZED HEALTH CARE EDUCATION/TRAINING PROGRAM

## 2023-04-24 PROCEDURE — 84165 PATHOLOGIST INTERPRETATION SPE: ICD-10-PCS | Mod: 26,,, | Performed by: PATHOLOGY

## 2023-04-24 PROCEDURE — 80053 COMPREHEN METABOLIC PANEL: CPT

## 2023-04-24 PROCEDURE — 83521 IG LIGHT CHAINS FREE EACH: CPT | Mod: 59 | Performed by: STUDENT IN AN ORGANIZED HEALTH CARE EDUCATION/TRAINING PROGRAM

## 2023-04-24 PROCEDURE — 81001 URINALYSIS AUTO W/SCOPE: CPT

## 2023-04-24 PROCEDURE — 84100 ASSAY OF PHOSPHORUS: CPT

## 2023-04-24 PROCEDURE — 84165 PROTEIN E-PHORESIS SERUM: CPT | Mod: 26,,, | Performed by: PATHOLOGY

## 2023-04-24 PROCEDURE — 95813 EEG EXTND MNTR 61-119 MIN: CPT

## 2023-04-24 PROCEDURE — 99233 PR SUBSEQUENT HOSPITAL CARE,LEVL III: ICD-10-PCS | Mod: ,,, | Performed by: INTERNAL MEDICINE

## 2023-04-24 PROCEDURE — 86334 PATHOLOGIST INTERPRETATION IFE: ICD-10-PCS | Mod: 26,,, | Performed by: PATHOLOGY

## 2023-04-24 PROCEDURE — 83735 ASSAY OF MAGNESIUM: CPT

## 2023-04-24 PROCEDURE — 84165 PROTEIN E-PHORESIS SERUM: CPT | Performed by: STUDENT IN AN ORGANIZED HEALTH CARE EDUCATION/TRAINING PROGRAM

## 2023-04-24 PROCEDURE — 82330 ASSAY OF CALCIUM: CPT | Performed by: STUDENT IN AN ORGANIZED HEALTH CARE EDUCATION/TRAINING PROGRAM

## 2023-04-24 RX ORDER — HYDROCODONE BITARTRATE AND ACETAMINOPHEN 500; 5 MG/1; MG/1
TABLET ORAL
Status: DISCONTINUED | OUTPATIENT
Start: 2023-04-24 | End: 2023-04-26

## 2023-04-24 RX ADMIN — PIPERACILLIN SODIUM AND TAZOBACTAM SODIUM 4.5 G: 4; .5 INJECTION, POWDER, FOR SOLUTION INTRAVENOUS at 03:04

## 2023-04-24 RX ADMIN — OXYCODONE HYDROCHLORIDE 10 MG: 10 TABLET ORAL at 09:04

## 2023-04-24 RX ADMIN — CARVEDILOL 12.5 MG: 12.5 TABLET, FILM COATED ORAL at 09:04

## 2023-04-24 RX ADMIN — OXYCODONE HYDROCHLORIDE 5 MG: 5 TABLET ORAL at 06:04

## 2023-04-24 RX ADMIN — HEPARIN SODIUM 5000 UNITS: 5000 INJECTION INTRAVENOUS; SUBCUTANEOUS at 09:04

## 2023-04-24 RX ADMIN — HEPARIN SODIUM 5000 UNITS: 5000 INJECTION INTRAVENOUS; SUBCUTANEOUS at 04:04

## 2023-04-24 RX ADMIN — CLOPIDOGREL BISULFATE 75 MG: 75 TABLET ORAL at 09:04

## 2023-04-24 RX ADMIN — OXYCODONE HYDROCHLORIDE 5 MG: 5 TABLET ORAL at 05:04

## 2023-04-24 RX ADMIN — PANTOPRAZOLE SODIUM 40 MG: 40 TABLET, DELAYED RELEASE ORAL at 09:04

## 2023-04-24 RX ADMIN — ATORVASTATIN CALCIUM 40 MG: 40 TABLET, FILM COATED ORAL at 09:04

## 2023-04-24 RX ADMIN — MICAFUNGIN SODIUM 100 MG: 100 INJECTION, POWDER, LYOPHILIZED, FOR SOLUTION INTRAVENOUS at 05:04

## 2023-04-24 RX ADMIN — AMLODIPINE BESYLATE 10 MG: 10 TABLET ORAL at 09:04

## 2023-04-24 RX ADMIN — DULOXETINE 30 MG: 30 CAPSULE, DELAYED RELEASE ORAL at 09:04

## 2023-04-24 RX ADMIN — ASPIRIN 81 MG: 81 TABLET, COATED ORAL at 09:04

## 2023-04-24 RX ADMIN — PIPERACILLIN SODIUM AND TAZOBACTAM SODIUM 4.5 G: 4; .5 INJECTION, POWDER, FOR SOLUTION INTRAVENOUS at 04:04

## 2023-04-24 RX ADMIN — HEPARIN SODIUM 5000 UNITS: 5000 INJECTION INTRAVENOUS; SUBCUTANEOUS at 05:04

## 2023-04-24 NOTE — CONSULTS
[facility] Surg  Nephrology  Consult Note    Patient Name: Nelsy Marino  MRN: [id number]  Admission Date: 4/20/2023  Hospital Length of Stay: 2 days  Attending Provider: Kay Benavidez MD   Primary Care Physician: Juan F Garay  Principal Problem:PAD (peripheral artery disease)    Inpatient consult to Nephrology  Consult performed by: Flip Priest MD  Consult ordered by: Boris Mckeon DO        Subjective:     HPI: Ms Marino is an 81yo lady with HTN, DMII, DVT, CHF, PAD, DVT, left to right fem to SFA bypass and right SFA angioplasty with stent placement on 03/2/23, complicated by wound dehiscence of the left groin with graft exposure, and on outpatient cefepime plus micafungin (EOC5/16/23) who was admitted with left foot pain and skin changes concerning for necrosis. This was associated with progressive confusion. She was admitted to  service where she was continued on cefepime and micafungin, later changed to zosyn and micafungin to rule out cefepime toxicity. Confusion has worsened in the hospital since admission 2 days ago with ongoing pain of her feet. Vascular surgery consulted, recommending repeat ARCHIE and toe pressures. Head CT on 4/22/23 negative for acute findings. EEG ordered. Patient transitioned to Zosyn and micafungin. LOLY noted at admission, improved initially with fluids but worsened again on 4/23/23, likely from poor oral intake. Fluids reinitiated and rivas placed, no additional increase in Cr noted and Nephrology was consulted for assessment and management of LOLY on CKD.        Past Medical History:   Diagnosis Date    CHF (congestive heart failure)     Diabetes mellitus     DVT (deep venous thrombosis)     Hypertension     Miscarriage        Past Surgical History:   Procedure Laterality Date     femoral vascular surgery       ANGIOGRAPHY OF LOWER EXTREMITY Right 2/23/2023    Procedure: Angiogram Extremity Unilateral;  Surgeon: Danny Dunbar MD;  Location: [facility] OR;   Service: Vascular;  Laterality: Right;  RN PHONE PREOP 2/20/2023----CK CONSENT    ANGIOGRAPHY OF LOWER EXTREMITY Right 4/4/2023    Procedure: Angiogram Extremity Unilateral - possible endovascular intervention;  Surgeon: René Simms MD;  Location: [facility] OR [address] [place] [address];  Service: Vascular;  Laterality: Right;  19.6 mins.  740.81 mGy  73.3399 Gy.cm  58ml dye  local - 1ml    CREATION OF FEMORAL-FEMORAL ARTERY BYPASS WITH GRAFT Bilateral 3/2/2023    Procedure: CREATION, BYPASS, ARTERIAL, FEMORAL TO FEMORAL, USING GRAFT, LEFT FEMORAL ENDARTERECTOMY, LEFT ILIAC ARTERY STENT PLACEMENT, RIGHT LOWER EXTREMITY ANGIOGRAM AND RIGHT SFA  ANGIOPLASTY;  Surgeon: Danny Dunbar MD;  Location: [facility] OR [address] [place] [address];  Service: Vascular;  Laterality: Bilateral;   744.47 MGY  175.36 Gycm  Flouro time 20.5 mins      CREATION OF ILIOFEMORAL ARTERY BYPASS Right 3/20/2019    Procedure: CREATION, BYPASS, ARTERIAL, ILIAC TO FEMORAL, RIGHT LOWER EXTREMITY, RIGHT PROFUNDAPLASTY;  Surgeon: Danny Dunbar MD;  Location: [facility];  Service: Vascular;  Laterality: Right;  11:00AM START PER NAZANIN  RN PREOP 3/13/2019  ACT'S, CELL SAVER, GRAFTS, BOOK WATER---NEED H/P  CONSENT IN AM------HGBA1C    CREATION OF MUSCLE ROTATIONAL FLAP Left 3/31/2023    Procedure: CREATION, FLAP, MUSCLE ROTATION;  Surgeon: Brandon Smiley MD;  Location: [facility] OR [address] [place] [address];  Service: Plastics;  Laterality: Left;    ESOPHAGOGASTRODUODENOSCOPY N/A 3/17/2023    Procedure: EGD (ESOPHAGOGASTRODUODENOSCOPY);  Surgeon: Gee Rodriguez MD;  [facility]  Service: Endoscopy;  Laterality: N/A;    ESOPHAGOGASTRODUODENOSCOPY N/A 4/11/2023    Procedure: EGD (ESOPHAGOGASTRODUODENOSCOPY);  Surgeon: Nazanin Kaiser MD;  [facility]  Service: Endoscopy;  Laterality: N/A;    FOOT AMPUTATION THROUGH METATARSAL Right 3/4/2023    Procedure: AMPUTATION, FOOT, TRANSMETATARSAL;  Surgeon: Benjamin Godfrey DPM;  Location: [facility] OR [facility];  Service: Podiatry;  Laterality: Right;    HYSTERECTOMY      ?unsure cervix present    INCISION AND DRAINAGE Right 4/12/2019    Procedure: Incision and Drainage - R groin washout, possible muscle flap;  Surgeon: Danny Dunbar MD;  Location: [facility] OR 2ND FLR;  Service: Vascular;  Laterality: Right;  groin washout    INCISION AND DRAINAGE OF GROIN Right 5/3/2019    Procedure: Incision and Drainage R groin;  Surgeon: Danny Dunbar MD;  Location: [facility] OR [facility] FLR;  Service: Vascular;  Laterality: Right;    WOUND DEBRIDEMENT Left 3/31/2023    Procedure: DEBRIDEMENT, WOUND;  Surgeon: René Simms MD;  Location: [facility] OR [facility] FLR;  Service: Vascular;  Laterality: Left;       Review of patient's allergies indicates:   Allergen Reactions    Motrin [ibuprofen] Itching     Current Facility-Administered Medications   Medication Frequency    amLODIPine tablet 10 mg Daily    aspirin EC tablet 81 mg Daily    atorvastatin tablet 40 mg QHS    carvediloL tablet 12.5 mg BID    clopidogreL tablet 75 mg Daily    dextrose 10% bolus 125 mL 125 mL PRN    dextrose 10% bolus 250 mL 250 mL PRN    dextrose 40 % gel 15,000 mg PRN    dextrose 40 % gel 30,000 mg PRN    DULoxetine DR capsule 30 mg BID    glucagon (human recombinant) injection 1 mg PRN    heparin (porcine) injection 5,000 Units Q8H    HYDROmorphone injection 0.5 mg Q6H PRN    insulin aspart U-100 pen 0-5 Units QID (AC + HS) PRN    micafungin 100 mg in sodium chloride 0.9 % 100 mL IVPB (MB+) Q24H    mupirocin 2 % ointment BID    naloxone 0.4 mg/mL injection 0.02 mg PRN    ondansetron injection 4 mg Q6H PRN    oxyCODONE immediate release tablet 5 mg Q4H PRN    oxyCODONE immediate release tablet Tab 10 mg Q4H PRN    pantoprazole EC tablet 40 mg BID    piperacillin-tazobactam (ZOSYN) 4.5 g in dextrose 5 % in water (D5W) 5 % 100 mL IVPB (MB+) Q12H    sodium chloride 0.9% flush 10 mL Q12H PRN     Family History       Problem Relation (Age of  Onset)    Diabetes Father    Hypertension Father          Tobacco Use    Smoking status: Former     Types: Cigarettes     Quit date: 1980     Years since quittin.3    Smokeless tobacco: Never   Substance and Sexual Activity    Alcohol use: No    Drug use: No    Sexual activity: Never     Review of Systems   Reason unable to perform ROS: ROS assisted by patient's sister at bedside.   Constitutional:  Negative for activity change, appetite change and fever.   HENT: Negative.  Negative for ear pain and sore throat.    Eyes: Negative.    Respiratory:  Negative for cough, shortness of breath and wheezing.    Cardiovascular:  Negative for chest pain and leg swelling.   Gastrointestinal:  Negative for abdominal pain, constipation, diarrhea, nausea and vomiting.   Endocrine: Negative.    Genitourinary: Negative.  Negative for flank pain.   Musculoskeletal:  Positive for arthralgias and myalgias.   Skin:  Positive for wound. Negative for color change and rash.   Allergic/Immunologic: Negative.    Neurological:  Negative for headaches.   Hematological: Negative.    Psychiatric/Behavioral:  Positive for confusion. Negative for agitation.    Objective:     Vital Signs (Most Recent):  Temp: 99 °F (37.2 °C) (23 1544)  Pulse: 84 (23 1130)  Resp: 19 (23 0700)  BP: 133/73 (23 0700)  SpO2: (Abnormal) 93 % (23 0700)   Vital Signs (24h Range):  Temp:  [98.4 °F (36.9 °C)-99.3 °F (37.4 °C)] 99 °F (37.2 °C)  Pulse:  [76-89] 84  Resp:  [14-29] 19  SpO2:  [93 %-98 %] 93 %  BP: (132-159)/(72-87) 133/73     Weight: 68 kg (150 lb) (23)  Body mass index is 24.96 kg/m².  Body surface area is 1.77 meters squared.    I/O last 3 completed shifts:  In: 251.3 [Blood:251.3]  Out: 831.5 [Urine:825; Drains:6.5]    Physical Exam  Vitals and nursing note reviewed.   Constitutional:       General: She is not in acute distress.     Appearance: She is not ill-appearing.   HENT:      Head: Normocephalic and  atraumatic.      Right Ear: External ear normal.      Left Ear: External ear normal.      Nose: No congestion.      Mouth/Throat:      Pharynx: Oropharynx is clear.   Eyes:      General:         Right eye: No discharge.         Left eye: No discharge.      Extraocular Movements: Extraocular movements intact.      Conjunctiva/sclera: Conjunctivae normal.   Cardiovascular:      Rate and Rhythm: Normal rate and regular rhythm.      Pulses: Normal pulses.      Heart sounds: Normal heart sounds.   Pulmonary:      Effort: Pulmonary effort is normal. No respiratory distress.      Breath sounds: Normal breath sounds. No wheezing or rales.   Abdominal:      Palpations: Abdomen is soft.      Tenderness: There is no abdominal tenderness. There is no guarding.   Musculoskeletal:      Cervical back: Neck supple. No tenderness.      Right lower leg: No edema.      Left lower leg: No edema.      Comments: Dark skin discoloration over the dorsal aspect of the left foot. Right foot covered with compression dressing.    Skin:     General: Skin is warm.      Findings: Lesion present.   Neurological:      Mental Status: Mental status is at baseline.   Psychiatric:         Attention and Perception: Attention normal.         Mood and Affect: Mood and affect normal.         Speech: Speech normal.         Behavior: Behavior is cooperative.      Comments: Family at bedside: Pt able to answer questions but not always appropriate. No agitation or anxiety.        Significant Labs:  CBC:   Recent Labs   Lab 04/24/23  0548   WBC 9.95   RBC 2.83*   HGB 7.4*   HCT 24.1*      MCV 85   MCH 26.1*   MCHC 30.7*     CMP:   Recent Labs   Lab 04/24/23  0548   *   CALCIUM 9.2   ALBUMIN 1.8*   PROT 6.6      K 3.8   CO2 24      BUN 54*   CREATININE 2.5*   ALKPHOS 65   ALT <5*   AST 17   BILITOT 0.4     Recent Labs   Lab 04/23/23  1116   COLORU Straw  Straw   SPECGRAV 1.015  1.015   PHUR 6.0  6.0   PROTEINUA 1+*  1+*   BACTERIA  Rare   NITRITE Negative  Negative   LEUKOCYTESUR Negative  Negative   HYALINECASTS 0     All labs within the past 24 hours have been reviewed.    Significant Imaging:  All imaging studies in the past 24hrs reviewed    Assessment/Plan:     Renal/  LOLY (acute kidney injury)  Ms Marion is a 81yo lady being treated for PAD associated with acute left foot pain and left foot skin changes. 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 Pt also presented with elevated creatinine, concerning for LOLY in setting of acute infection. GFR with Creatinine Clearance at 23% and 2.1mg/dL on 4/20/23 at 2059. 2L of IV LR infused from 0921on 4/21/23 to 0920 on 4/22/23. GFR and Creatinine Clearance increased as expected to 32% and 1.6mg/dL on 4/21/23 at 0400. Paniagua in place to r/o retention complications. No reported dysuria prior to admission and negative for UTI on admission. Retroperitoneal US was negative for hydronephrosis but elevated bilateral resistive indices concerning for medical renal disease. Progressive encephalopathy noted, which may be neurotoxicity from cefepime therapy held on 4/22/23. Switched to Zosyn and Micafungin for continued infectious coverage. ID consulted for continued antibiotic stewardship. GFR with Creatinine Clearance now at 18.7% and Cr of 2.5mg/dL on 4/24/23 despite fluid resuscitation. Urine analysis with reflex, urine creatinine and urine sodium pending results.     Recommendations:  --New labs for CPK and urine protein creatinine ratio   --Follow up lab results  --Avoid nephrotoxic agents  --Monitor I/Os        Thank you for your consult. I will follow-up with patient. Please contact us if you have any additional questions.    Flip Priest MD  Nephrology  [facility] Surg

## 2023-04-24 NOTE — PROCEDURES
EXTENDED  ELECTROENCEPHALOGRAM  REPORT    DATE OF SERVICE: 4/24/23    METHODOLOGY   Electroencephalographic (EEG) recording is with electrodes placed according to the International 10-20 placement system.  Thirty two (32) channels of digital signal (sampling rate of 512/sec) including T1 and T2 was simultaneously recorded from the scalp and may include  EKG, EMG, and/or eye monitors.  Recording band pass was 0.1 to 512 hz.  Digital video recording of the patient is simultaneously recorded with the EEG.  The patient is instructed report clinical symptoms which may occur during the recording session.  EEG and video recording is stored and archived in digital format.  Activation procedures which include photic stimulation, hyperventilation and instructing patients to perform simple task are done in selected patients.   The EEG is displayed on a monitor screen and can be reviewed using different montages.  Computer assisted analysis is employed to detect spike and electrographic seizure activity.   The entire record is submitted for computer analysis.  The entire recording is visually reviewed and the times identified by computer analysis as being spikes or seizures are reviewed again.  Compresses spectral analysis (CSA) is also performed on the activity recorded from each individual channel.  This is displayed as a power display of frequencies from 0 to 30 Hz over time.   The CSA is reviewed looking for asymmetries in power between homologous areas of the scalp and then compared with the original EEG recording.     [employer] software was also utilized in the review of this study.  This software suite analyzes the EEG recording in multiple domains.  Coherence and rhythmicity is computed to identify EEG sections which may contain organized seizures.  Each channel undergoes analysis to detect presence of spike and sharp waves which have special and morphological characteristic of epileptic activity.  The routine EEG  recording is converted from spacial into frequency domain.  This is then displayed comparing homologous areas to identify areas of significant asymmetry.  Algorithm to identify non-cortically generated artifact is used to separate eye movement, EMG and other artifact from the EEG.      RECORDING TIMES  Start on 4/24 at 12:06, stop at 13:21    A total of  1  hrs and 14  min of EEG recording was obtained.    Indication: 82 year old female admitted for LOLY and left foot pain, concern for cefepime toxicity.  EEG to evaluate encephalopathy.      State of Consciousness:   Awake and asleep    Background:   The background is mildly disorganized, symmetric and continuous.  The AP gradient has fair representation, with a posterior dominant rhythm that reaches up to 7-8 hz at maximum awareness.  There is intermittent generalized delta slowing present.         Sleep:   Transition to sleep noted with symmetric sleep architecture    Non-epileptiform Abnormalities:  Intermittent slow, generalized    Epileptiform Abnormalities:   None    EKG:   Regular rate and rhythm on single lead EKG    Activating procedures:   - Hyperventilation and photic stimulation not performed  - Patient oriented to self, place, month at start of record    Events:   None      Impression:   Abnormal awake and sleep EEG due to slowing of the background rhythm (PDR 7-8).      Clinical interpretation:  This awake and sleep EEG is indicative of a mild to moderate diffuse encephalopathy.  There are no epileptiform discharges or lateralizing signs noted.     Judy Boudreaux MD  [facility]   Department of Neurology

## 2023-04-24 NOTE — ASSESSMENT & PLAN NOTE
- PT/OT reconsulted  - Fall precautions  - Established with HH PT/OT/nurse aide prior to hospitalization  - SW assisting in SNF placement

## 2023-04-24 NOTE — ASSESSMENT & PLAN NOTE
Ms Marino is a 83yo lady being treated for PAD associated with acute left foot pain and left foot skin changes. 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 Pt also presented with elevated creatinine, concerning for LOLY in setting of acute infection. GFR with Creatinine Clearance at 23% and 2.1mg/dL on 4/20/23 at 2059. 2L of IV LR infused from 0921on 4/21/23 to 0920 on 4/22/23. GFR and Creatinine Clearance increased as expected to 32% and 1.6mg/dL on 4/21/23 at 0400. Paniagua in place to r/o retention complications. No reported dysuria prior to admission and negative for UTI on admission. Retroperitoneal US was negative for hydronephrosis but elevated bilateral resistive indices concerning for medical renal disease. Progressive encephalopathy noted, which may be neurotoxicity from cefepime therapy held on 4/22/23. Switched to Zosyn and Micafungin for continued infectious coverage. ID consulted for continued antibiotic stewardship. GFR with Creatinine Clearance now at 18.7% and Cr of 2.5mg/dL on 4/24/23 despite fluid resuscitation. Urine analysis with reflex, urine creatinine and urine sodium pending results.     Recommendations:  --New labs for CPK and urine protein creatinine ratio   --Follow up lab results  --Avoid nephrotoxic agents  --Monitor I/Os

## 2023-04-24 NOTE — HPI
Ms Marino is an 83yo lady with HTN, DMII, DVT, CHF, PAD, DVT, left to right fem to SFA bypass and right SFA angioplasty with stent placement on 03/2/23, complicated by wound dehiscence of the left groin with graft exposure, and on outpatient cefepime plus micafungin (EOC5/16/23) who was admitted with left foot pain and skin changes concerning for necrosis. This was associated with progressive confusion. She was admitted to  service where she was continued on cefepime and micafungin, later changed to zosyn and micafungin to rule out cefepime toxicity. Confusion has worsened in the hospital since admission 2 days ago with ongoing pain of her feet. Vascular surgery consulted, recommending repeat ARCHIE and toe pressures. Head CT on 4/22/23 negative for acute findings. EEG ordered. Patient transitioned to Zosyn and micafungin. LOLY noted at admission, improved initially with fluids but worsened again on 4/23/23, likely from poor oral intake. Fluids reinitiated and rivas placed, no additional increase in Cr noted and Nephrology was consulted for assessment and management of LOLY on CKD.

## 2023-04-24 NOTE — PT/OT/SLP PROGRESS
Speech Language Pathology Treatment    Patient Name:  Nelsy Marino   MRN:  [id number]  Admitting Diagnosis: PAD (peripheral artery disease)    Recommendations:                 General Recommendations:  Dysphagia therapy  Diet recommendations:  Mechanical soft, Liquid Diet Level: Thin -if any difficulty or if appears inefficent with solids, please return to purees  Aspiration Precautions: Alternating bites/sips, HOB to 90 degrees, Small bites/sips, and Strict aspiration precautions   General Precautions: Standard, fall    Subjective   Awake & alert, increased alertness compared to notes from previous session. Sister at bedside reports pt eating some puree but does not like it.    Pain/Comfort:  Pain Rating 1: 0/10  Pain Rating Post-Intervention 2: 0/10    Respiratory Status: Room air    Objective:     Has the patient been evaluated by SLP for swallowing?   Yes  Keep patient NPO? No     Pt repositioned to further upright in bed. Pt edentulous. Pt reports that she sometimes eats without dentures. Pt tolerated straw sips of thin x5 with no s/s aspiration & adequate oral phase. Given solid trial of sedrick cracker, pt with prolonged mastication & slow oral transit with eventual clearance. Dentures were put in but appeared very loose & ineffective & were removed. Pt tolerated trials of 2 additional solids with same prolonged mastication & decreased A-P propulsion but eventual clearance. SLP recs cautious initiation of mechanical soft diet. If any s/s aspiration, difficulty or if appears inefficient 2/2 amount of time needed, pt should be placed on purees. SLP educated pt on all recs, precautions, risks & s/s aspiration, role of SLP, POC, etc. Both pt & sister verbalized understanding.     Assessment:     Nelsy Marino is a 82 y.o. female with an SLP diagnosis of Dysphagia, improving with increased level of alertness    Goals:   Multidisciplinary Problems       SLP Goals          Problem: SLP    Goal Priority Disciplines  Outcome   SLP Goal     SLP    Description: Speech Language Pathology Goals  Goals expected to be met by 4/30    1. Pt will tolerate diet of thin liquids and minced moist solids without overt clinical signs of aspiration                          Plan:     Patient to be seen:  3x/wk    Plan of Care expires:     Plan of Care reviewed with:  patient, sibling   SLP Follow-Up:  Yes       Discharge recommendations:   (tbd)     Time Tracking:     SLP Treatment Date:   04/24/23  Speech Start Time:  1114  Speech Stop Time:  1137     Speech Total Time (min):  23 min    Billable Minutes: Treatment Swallowing Dysfunction 12 and Self Care/Home Management Training 11    04/24/2023

## 2023-04-24 NOTE — PLAN OF CARE
S/P transmetatarsal amputation of foot, right  Patient has no clinical signs of foot infection noted on physical exam. Ischemic changes at right TMA site and left forefoot. Known PAD s/p revascularizations.     Plan:  -No intervention by podiatry.  -Appreciate vascular surgery recs, no intervention.  -Daily betadine paint by nursing.  -BLE WBAT in surgical shoes.   -Podiatry will follow.   DC Instructions: Please order ambulatory referral to podiatry (Short). BLE WBAT in surgical shoes. Daily betadine paint to ischemic, dark, necrotic areas of both feet.     Lalo Pollock DPM PGY-3  Podiatric Medicine & Surgery  [facility]  Secure Chat Preferred  Mobile: [phone]  Pager: [phone]

## 2023-04-24 NOTE — SUBJECTIVE & OBJECTIVE
Past Medical History:   Diagnosis Date    CHF (congestive heart failure)     Diabetes mellitus     DVT (deep venous thrombosis)     Hypertension     Miscarriage        Past Surgical History:   Procedure Laterality Date     femoral vascular surgery       ANGIOGRAPHY OF LOWER EXTREMITY Right 2/23/2023    Procedure: Angiogram Extremity Unilateral;  Surgeon: Danny Dunbar MD;  Location: [facility];  Service: Vascular;  Laterality: Right;  RN PHONE PREOP 2/20/2023----CK CONSENT    ANGIOGRAPHY OF LOWER EXTREMITY Right 4/4/2023    Procedure: Angiogram Extremity Unilateral - possible endovascular intervention;  Surgeon: René Simms MD;  Location: [facility] OR 2ND FLR;  Service: Vascular;  Laterality: Right;  19.6 mins.  740.81 mGy  73.3399 Gy.cm  58ml dye  local - 1ml    CREATION OF FEMORAL-FEMORAL ARTERY BYPASS WITH GRAFT Bilateral 3/2/2023    Procedure: CREATION, BYPASS, ARTERIAL, FEMORAL TO FEMORAL, USING GRAFT, LEFT FEMORAL ENDARTERECTOMY, LEFT ILIAC ARTERY STENT PLACEMENT, RIGHT LOWER EXTREMITY ANGIOGRAM AND RIGHT SFA  ANGIOPLASTY;  Surgeon: Danny Dunbar MD;  [facility];  Service: Vascular;  Laterality: Bilateral;   744.47 MGY  175.36 Gycm  Flouro time 20.5 mins      CREATION OF ILIOFEMORAL ARTERY BYPASS Right 3/20/2019    Procedure: CREATION, BYPASS, ARTERIAL, ILIAC TO FEMORAL, RIGHT LOWER EXTREMITY, RIGHT PROFUNDAPLASTY;  Surgeon: Danny Dunbar MD;  Location: [facility];  Service: Vascular;  Laterality: Right;  11:00AM START PER ANNE  RN PREOP 3/13/2019  ACT'S, CELL SAVER, GRAFTS, BOOK WATER---NEED H/P  CONSENT IN AM------HGBA1C    CREATION OF MUSCLE ROTATIONAL FLAP Left 3/31/2023    Procedure: CREATION, FLAP, MUSCLE ROTATION;  Surgeon: Brandon Smiley MD;  Location: [facility] OR 2ND FLR;  Service: Plastics;  Laterality: Left;    ESOPHAGOGASTRODUODENOSCOPY N/A 3/17/2023    Procedure: EGD (ESOPHAGOGASTRODUODENOSCOPY);  Surgeon: Gee Rodriguez MD;  Location: [facility] ENDO (2ND FLR);   Service: Endoscopy;  Laterality: N/A;    ESOPHAGOGASTRODUODENOSCOPY N/A 4/11/2023    Procedure: EGD (ESOPHAGOGASTRODUODENOSCOPY);  Surgeon: Nazanin Kaiser MD;  Location: [facility] (2ND FLR);  Service: Endoscopy;  Laterality: N/A;    FOOT AMPUTATION THROUGH METATARSAL Right 3/4/2023    Procedure: AMPUTATION, FOOT, TRANSMETATARSAL;  Surgeon: Benjamin Godfrey DPM;  [facility];  Service: Podiatry;  Laterality: Right;    HYSTERECTOMY      ?unsure cervix present    INCISION AND DRAINAGE Right 4/12/2019    Procedure: Incision and Drainage - R groin washout, possible muscle flap;  Surgeon: Danny Dunbar MD;  [facility];  Service: Vascular;  Laterality: Right;  groin washout    INCISION AND DRAINAGE OF GROIN Right 5/3/2019    Procedure: Incision and Drainage R groin;  Surgeon: Danny Dunbar MD;  [facility];  Service: Vascular;  Laterality: Right;    WOUND DEBRIDEMENT Left 3/31/2023    Procedure: DEBRIDEMENT, WOUND;  Surgeon: René Simms MD;  [facility];  Service: Vascular;  Laterality: Left;       Review of patient's allergies indicates:   Allergen Reactions    Motrin [ibuprofen] Itching     Current Facility-Administered Medications   Medication Frequency    amLODIPine tablet 10 mg Daily    aspirin EC tablet 81 mg Daily    atorvastatin tablet 40 mg QHS    carvediloL tablet 12.5 mg BID    clopidogreL tablet 75 mg Daily    dextrose 10% bolus 125 mL 125 mL PRN    dextrose 10% bolus 250 mL 250 mL PRN    dextrose 40 % gel 15,000 mg PRN    dextrose 40 % gel 30,000 mg PRN    DULoxetine DR capsule 30 mg BID    glucagon (human recombinant) injection 1 mg PRN    heparin (porcine) injection 5,000 Units Q8H    HYDROmorphone injection 0.5 mg Q6H PRN    insulin aspart U-100 pen 0-5 Units QID (AC + HS) PRN    micafungin 100 mg in sodium chloride 0.9 % 100 mL IVPB (MB+) Q24H    mupirocin 2 % ointment BID    naloxone 0.4 mg/mL injection 0.02 mg PRN     ondansetron injection 4 mg Q6H PRN    oxyCODONE immediate release tablet 5 mg Q4H PRN    oxyCODONE immediate release tablet Tab 10 mg Q4H PRN    pantoprazole EC tablet 40 mg BID    piperacillin-tazobactam (ZOSYN) 4.5 g in dextrose 5 % in water (D5W) 5 % 100 mL IVPB (MB+) Q12H    sodium chloride 0.9% flush 10 mL Q12H PRN     Family History       Problem Relation (Age of Onset)    Diabetes Father    Hypertension Father          Tobacco Use    Smoking status: Former     Types: Cigarettes     Quit date:      Years since quittin.3    Smokeless tobacco: Never   Substance and Sexual Activity    Alcohol use: No    Drug use: No    Sexual activity: Never     Review of Systems   Reason unable to perform ROS: ROS assisted by patient's sister at bedside.   Constitutional:  Negative for activity change, appetite change and fever.   HENT: Negative.  Negative for ear pain and sore throat.    Eyes: Negative.    Respiratory:  Negative for cough, shortness of breath and wheezing.    Cardiovascular:  Negative for chest pain and leg swelling.   Gastrointestinal:  Negative for abdominal pain, constipation, diarrhea, nausea and vomiting.   Endocrine: Negative.    Genitourinary: Negative.  Negative for flank pain.   Musculoskeletal:  Positive for arthralgias and myalgias.   Skin:  Positive for wound. Negative for color change and rash.   Allergic/Immunologic: Negative.    Neurological:  Negative for headaches.   Hematological: Negative.    Psychiatric/Behavioral:  Positive for confusion. Negative for agitation.    Objective:     Vital Signs (Most Recent):  Temp: 99 °F (37.2 °C) (23 1544)  Pulse: 84 (23 1130)  Resp: 19 (23 0700)  BP: 133/73 (23 0700)  SpO2: (Abnormal) 93 % (23 0700)   Vital Signs (24h Range):  Temp:  [98.4 °F (36.9 °C)-99.3 °F (37.4 °C)] 99 °F (37.2 °C)  Pulse:  [76-89] 84  Resp:  [14-29] 19  SpO2:  [93 %-98 %] 93 %  BP: (132-159)/(72-87) 133/73     Weight: 68 kg (150 lb) (23  2035)  Body mass index is 24.96 kg/m².  Body surface area is 1.77 meters squared.    I/O last 3 completed shifts:  In: 251.3 [Blood:251.3]  Out: 831.5 [Urine:825; Drains:6.5]    Physical Exam  Vitals and nursing note reviewed.   Constitutional:       General: She is not in acute distress.     Appearance: She is not ill-appearing.   HENT:      Head: Normocephalic and atraumatic.      Right Ear: External ear normal.      Left Ear: External ear normal.      Nose: No congestion.      Mouth/Throat:      Pharynx: Oropharynx is clear.   Eyes:      General:         Right eye: No discharge.         Left eye: No discharge.      Extraocular Movements: Extraocular movements intact.      Conjunctiva/sclera: Conjunctivae normal.   Cardiovascular:      Rate and Rhythm: Normal rate and regular rhythm.      Pulses: Normal pulses.      Heart sounds: Normal heart sounds.   Pulmonary:      Effort: Pulmonary effort is normal. No respiratory distress.      Breath sounds: Normal breath sounds. No wheezing or rales.   Abdominal:      Palpations: Abdomen is soft.      Tenderness: There is no abdominal tenderness. There is no guarding.   Musculoskeletal:      Cervical back: Neck supple. No tenderness.      Right lower leg: No edema.      Left lower leg: No edema.      Comments: Dark skin discoloration over the dorsal aspect of the left foot. Right foot covered with compression dressing.    Skin:     General: Skin is warm.      Findings: Lesion present.   Neurological:      Mental Status: Mental status is at baseline.   Psychiatric:         Attention and Perception: Attention normal.         Mood and Affect: Mood and affect normal.         Speech: Speech normal.         Behavior: Behavior is cooperative.      Comments: Family at bedside: Pt able to answer questions but not always appropriate. No agitation or anxiety.        Significant Labs:  CBC:   Recent Labs   Lab 04/24/23  0548   WBC 9.95   RBC 2.83*   HGB 7.4*   HCT 24.1*      MCV  85   MCH 26.1*   MCHC 30.7*     CMP:   Recent Labs   Lab 04/24/23  0548   *   CALCIUM 9.2   ALBUMIN 1.8*   PROT 6.6      K 3.8   CO2 24      BUN 54*   CREATININE 2.5*   ALKPHOS 65   ALT <5*   AST 17   BILITOT 0.4     Recent Labs   Lab 04/23/23  1116   COLORU Straw  Straw   SPECGRAV 1.015  1.015   PHUR 6.0  6.0   PROTEINUA 1+*  1+*   BACTERIA Rare   NITRITE Negative  Negative   LEUKOCYTESUR Negative  Negative   HYALINECASTS 0     All labs within the past 24 hours have been reviewed.    Significant Imaging:  All imaging studies in the past 24hrs reviewed

## 2023-04-24 NOTE — PLAN OF CARE
CM met with pt and sister at bedside to discuss SNf options. Pts sister asked me to call pts spouse for details. CM spoke to Dimas Abad [phone] and he stated this is first preference would be the [facility] on the 3rd floor rather than a nursing facility and if she can not go there then he would like her to go to [facility]. EMELIA sent SNF referral to both facilities via careport and is following for acceptance. CM following.    04/24/23 1208   Post-Acute Status   Post-Acute Authorization Placement   Post-Acute Placement Status Referrals Sent   Patient choice form signed by patient/caregiver List with quality metrics by geographic area provided

## 2023-04-24 NOTE — SUBJECTIVE & OBJECTIVE
Interval History:  Mentation continues to improve, patient remains somnolent but engages in interview today. Retaining urine on bladder scan yday, rivas placed and Cr continued to rise. RPUS normal. Urine studies pending, nephro consulted. SW pursuing SNF placement.    Past Medical History:   Diagnosis Date    CHF (congestive heart failure)     Diabetes mellitus     DVT (deep venous thrombosis)     Hypertension     Miscarriage        Past Surgical History:   Procedure Laterality Date     femoral vascular surgery       ANGIOGRAPHY OF LOWER EXTREMITY Right 2/23/2023    Procedure: Angiogram Extremity Unilateral;  Surgeon: Danny Dunbar MD;  Location: [facility];  Service: Vascular;  Laterality: Right;  RN PHONE PREOP 2/20/2023----CK CONSENT    ANGIOGRAPHY OF LOWER EXTREMITY Right 4/4/2023    Procedure: Angiogram Extremity Unilateral - possible endovascular intervention;  Surgeon: René Simms MD;  Location: [facility] OR [address] [place] [address];  Service: Vascular;  Laterality: Right;  19.6 mins.  740.81 mGy  73.3399 Gy.cm  58ml dye  local - 1ml    CREATION OF FEMORAL-FEMORAL ARTERY BYPASS WITH GRAFT Bilateral 3/2/2023    Procedure: CREATION, BYPASS, ARTERIAL, FEMORAL TO FEMORAL, USING GRAFT, LEFT FEMORAL ENDARTERECTOMY, LEFT ILIAC ARTERY STENT PLACEMENT, RIGHT LOWER EXTREMITY ANGIOGRAM AND RIGHT SFA  ANGIOPLASTY;  Surgeon: Danny Dunbar MD;  Location: [facility] OR [address] [place] [address];  Service: Vascular;  Laterality: Bilateral;   744.47 MGY  175.36 Gycm  Flouro time 20.5 mins      CREATION OF ILIOFEMORAL ARTERY BYPASS Right 3/20/2019    Procedure: CREATION, BYPASS, ARTERIAL, ILIAC TO FEMORAL, RIGHT LOWER EXTREMITY, RIGHT PROFUNDAPLASTY;  Surgeon: Danny Dunbar MD;  Location: [facility];  Service: Vascular;  Laterality: Right;  11:00AM START PER ANNE RICHARDS PREOP 3/13/2019  ACT'S, CELL SAVER, GRAFTS, BOOK WATER---NEED H/P  CONSENT IN AM------HGBA1C    CREATION OF MUSCLE ROTATIONAL FLAP Left 3/31/2023    Procedure: CREATION, FLAP, MUSCLE  ROTATION;  Surgeon: Brandon Smiley MD;  Location: [address];  Service: Plastics;  Laterality: Left;    ESOPHAGOGASTRODUODENOSCOPY N/A 3/17/2023    Procedure: EGD (ESOPHAGOGASTRODUODENOSCOPY);  Surgeon: Gee Rodriguez MD;  Location: [facility] ENDO (2ND FLR);  Service: Endoscopy;  Laterality: N/A;    ESOPHAGOGASTRODUODENOSCOPY N/A 4/11/2023    Procedure: EGD (ESOPHAGOGASTRODUODENOSCOPY);  Surgeon: Nazanin Kaiser MD;  Location: [facility] ENDO (2ND FLR);  Service: Endoscopy;  Laterality: N/A;    FOOT AMPUTATION THROUGH METATARSAL Right 3/4/2023    Procedure: AMPUTATION, FOOT, TRANSMETATARSAL;  Surgeon: Benjamin Godfrey DPM;  Location: [address];  Service: Podiatry;  Laterality: Right;    HYSTERECTOMY      ?unsure cervix present    INCISION AND DRAINAGE Right 4/12/2019    Procedure: Incision and Drainage - R groin washout, possible muscle flap;  Surgeon: Danny Dunbar MD;  Location: [address];  Service: Vascular;  Laterality: Right;  groin washout    INCISION AND DRAINAGE OF GROIN Right 5/3/2019    Procedure: Incision and Drainage R groin;  Surgeon: Danny Dunbar MD;  Location: [address];  Service: Vascular;  Laterality: Right;    WOUND DEBRIDEMENT Left 3/31/2023    Procedure: DEBRIDEMENT, WOUND;  Surgeon: René Simms MD;  Location: [address];  Service: Vascular;  Laterality: Left;       Review of patient's allergies indicates:   Allergen Reactions    Motrin [ibuprofen] Itching       No current facility-administered medications on file prior to encounter.     Current Outpatient Medications on File Prior to Encounter   Medication Sig    amlodipine (NORVASC) 10 MG tablet Take 10 mg by mouth once daily.    aspirin (ECOTRIN) 81 MG EC tablet Take 81 mg by mouth once daily.    atorvastatin (LIPITOR) 40 MG tablet Take 1 tablet (40 mg total) by mouth every evening.    BLOOD SUGAR DIAGNOSTIC (TRUE METRIX GLUCOSE TEST STRIP MISC) by Misc.(Non-Drug; Combo Route) route.     "carvediloL (COREG) 12.5 MG tablet Take 12.5 mg by mouth 2 (two) times daily.    cephALEXin (KEFLEX) 500 MG capsule Take 2 capsules (1,000 mg total) by mouth every 12 (twelve) hours.    clopidogreL (PLAVIX) 75 mg tablet Take 1 tablet (75 mg total) by mouth once daily.    DULoxetine (CYMBALTA) 30 MG capsule Take 1 capsule (30 mg total) by mouth 2 (two) times daily.    furosemide (LASIX) 20 MG tablet Take 1 tablet (20 mg total) by mouth once daily.    insulin aspart protamine-insulin aspart (NOVOLOG MIX 70-30FLEXPEN U-100) 100 unit/mL (70-30) InPn pen Inject 12 Units into the skin 2 (two) times daily before meals.    insulin syringe-needle,dispos. 0.3 mL 30 gauge x 5/16" Syrg by Misc.(Non-Drug; Combo Route) route.    losartan-hydrochlorothiazide 100-25 mg (HYZAAR) 100-25 mg per tablet Take 1 tablet by mouth once daily.    metformin (GLUCOPHAGE) 1000 MG tablet Take 1,000 mg by mouth 2 (two) times daily with meals.    ondansetron (ZOFRAN) 8 MG tablet     oxyCODONE (ROXICODONE) 5 MG immediate release tablet Take 1 tablet (5 mg total) by mouth every 6 (six) hours as needed for Pain.    pantoprazole (PROTONIX) 40 MG tablet Take 1 tablet (40 mg total) by mouth 2 (two) times daily.    pen needle, diabetic 32 gauge x 5/32" Ndle Inject 1 each into the skin 4 (four) times daily.     Family History       Problem Relation (Age of Onset)    Diabetes Father    Hypertension Father          Tobacco Use    Smoking status: Former     Types: Cigarettes     Quit date:      Years since quittin.3    Smokeless tobacco: Never   Substance and Sexual Activity    Alcohol use: No    Drug use: No    Sexual activity: Never     Review of Systems   Constitutional:  Negative for activity change, appetite change and fever.   HENT: Negative.     Eyes: Negative.    Respiratory:  Negative for cough, shortness of breath and wheezing.    Cardiovascular:  Negative for chest pain and leg swelling.   Gastrointestinal:  Negative for abdominal pain, " constipation, diarrhea, nausea and vomiting.   Endocrine: Negative.    Genitourinary: Negative.    Musculoskeletal:  Positive for arthralgias and myalgias.   Skin:  Positive for color change and wound. Negative for rash.   Allergic/Immunologic: Negative.    Neurological:  Negative for headaches.   Hematological: Negative.    Psychiatric/Behavioral:  Positive for confusion.    Objective:     Vital Signs (Most Recent):  Temp: 98.8 °F (37.1 °C) (04/24/23 1106)  Pulse: 84 (04/24/23 1130)  Resp: 19 (04/24/23 0700)  BP: 133/73 (04/24/23 0700)  SpO2: (!) 93 % (04/24/23 0700)   Vital Signs (24h Range):  Temp:  [97.5 °F (36.4 °C)-99.3 °F (37.4 °C)] 98.8 °F (37.1 °C)  Pulse:  [76-89] 84  Resp:  [14-29] 19  SpO2:  [93 %-98 %] 93 %  BP: (132-159)/(72-87) 133/73     Weight: 68 kg (150 lb)  Body mass index is 24.96 kg/m².    Physical Exam  Vitals and nursing note reviewed.   Constitutional:       General: She is not in acute distress.     Appearance: She is ill-appearing.   HENT:      Head: Normocephalic and atraumatic.      Mouth/Throat:      Mouth: Mucous membranes are dry.      Pharynx: Oropharynx is clear.   Eyes:      Extraocular Movements: Extraocular movements intact.      Conjunctiva/sclera: Conjunctivae normal.   Cardiovascular:      Rate and Rhythm: Regular rhythm. Tachycardia present.      Pulses: Normal pulses.   Pulmonary:      Effort: Pulmonary effort is normal. No respiratory distress.      Breath sounds: No wheezing or rales.   Abdominal:      Palpations: Abdomen is soft.      Tenderness: There is no abdominal tenderness. There is no guarding.   Musculoskeletal:      Cervical back: Normal range of motion and neck supple.      Right lower leg: No edema.      Left lower leg: No edema.   Skin:     General: Skin is warm.      Findings: Lesion (R heel, L dorsum with darkened skin) present.   Neurological:      General: No focal deficit present.      Mental Status: Mental status is at baseline.      Comments: Following  commands  Able to respond yes/no          Significant Labs: All pertinent labs within the past 24 hours have been reviewed.    Significant Imaging: I have reviewed all pertinent imaging results/findings within the past 24 hours.

## 2023-04-24 NOTE — ASSESSMENT & PLAN NOTE
Patient with Hgb 7.5 on admission with baseline 8.6-11.   Initially suspected foot wounds, patient also has leg muscle flap with drain. Low drain output  Hgb 6.6, responded appropiately 7.9 repeat  Rivas placed 4/23; found to have hematuria in rivas 4/24 when Hgb 7.9->7.4    - CBC qd, trend  - transfuse pRBC for Hb < 7 g/dL; transfuse for < 8 if ACS or active bleeding and unresponsive to volume resuscitation with evidence of end-organ ischemia/>20% blood volume loss with HD instablity/>30% blood volume loss

## 2023-04-24 NOTE — PROGRESS NOTES
[facility] Medicine  Progress Note    Patient Name: Nelsy Marino  MRN: [id number]  Patient Class: IP- Inpatient   Admission Date: 4/20/2023  Length of Stay: 2 days  Attending Physician: Kay Benavidez MD  Primary Care Provider: Juan F Garay        Subjective:     Principal Problem:PAD (peripheral artery disease)        HPI:  This is an 82-year-old female with a past medical history of severe PAD, hypertension, type 2 diabetes, HFpEF (EF 65%), L femoral bypass and L leg flap, R toe ampuations 2 months prior with presenting after  noted waxing/waning confusion since her discharge from hospital two weeks ago. History largely provided by  at bedside as patient is intermittently somnolent. She was discharged on IV Cefepime and Micafungin for her R foot amputation and patient reports adherence with regimen at home. He grew concerned that int he past two weeks she had worsening LLE pain and darkened skin, which is what happened before her R toes were amputated. His niece contacted a nurse who recommneded ED evaluation. No fevers, chills, HA, chest pain, SOB, N/V/D, abdominal pain.    Pt receives  nurse aide, PT/OT since hospital DC and ID very recently made a referral to podiatry and wound care, but appointments not yet made.     In the ED patient was hemodynamically stable and hypertensive, intermittently on RA to 2L NC. Leukocytosis to 13, Hgb 7.5, Cr 2.1, Albumin 2.1, BCx in process, CT head and CXR wnl. Started on BS Abx with continued Cefepime, [place], started on Vanc pending Bcx, US LE arterial with diminished flow throughout the left lower extremity. Monophasic waveforms suggesting atherosclerotic disease. Admitted to  for LOLY and Podiatry/Wound care.       Overview/Hospital Course:  Admitted to hospital medicine for LOLY and left foot pain. Cr on admission 2.1 (bl.8-1.5), improved with IVF initially. After holding IVF Cr increased, and then unresponsive to fluids. Patient  with significant urine retention, rivas placed and Cr again increased. Consulted Nephrology for assistance. Vascular surgery consulted, recommended repeat ARCHIE and left foot arterial doppler. ID consulted, patient was receiving cefepime and micafungin outpatient per ID following R foot transmetatarsal amputation. Started on vanc, cefepime, and micafungin. Podiatry and wound care consulted. Patient more lethargic 4/22 although able to follow commands and move extremities on exam. With concern for cefepime toxicity, ID removed cefepime and vancomycin. Micafungin and zosyn continued. EEG and CTH ordered. CTH no acute abnormalities. Patient significantly improved following d/c Cefepime. Continued on IVF for LOLY and rivas placed for urinary retention. SLP consulted recommended pureed dysphagia diet.      Interval History:  Mentation continues to improve, patient remains somnolent but engages in interview today. Retaining urine on bladder scan yday, rivas placed and Cr continued to rise. RPUS normal. Urine studies pending, nephro consulted. SW pursuing SNF placement.    Past Medical History:   Diagnosis Date    CHF (congestive heart failure)     Diabetes mellitus     DVT (deep venous thrombosis)     Hypertension     Miscarriage        Past Surgical History:   Procedure Laterality Date     femoral vascular surgery       ANGIOGRAPHY OF LOWER EXTREMITY Right 2/23/2023    Procedure: Angiogram Extremity Unilateral;  Surgeon: Danny Dunbar MD;  Location: [facility] OR;  Service: Vascular;  Laterality: Right;  RN PHONE PREOP 2/20/2023----CK CONSENT    ANGIOGRAPHY OF LOWER EXTREMITY Right 4/4/2023    Procedure: Angiogram Extremity Unilateral - possible endovascular intervention;  Surgeon: René Simms MD;  Location: [facility] OR [address] [place] [address];  Service: Vascular;  Laterality: Right;  19.6 mins.  740.81 mGy  73.3399 Gy.cm  58ml dye  local - 1ml    CREATION OF FEMORAL-FEMORAL ARTERY BYPASS WITH GRAFT Bilateral 3/2/2023     Procedure: CREATION, BYPASS, ARTERIAL, FEMORAL TO FEMORAL, USING GRAFT, LEFT FEMORAL ENDARTERECTOMY, LEFT ILIAC ARTERY STENT PLACEMENT, RIGHT LOWER EXTREMITY ANGIOGRAM AND RIGHT SFA  ANGIOPLASTY;  Surgeon: Danny uDnbar MD;  Location: [facility] OR [address] [place] [address];  Service: Vascular;  Laterality: Bilateral;   744.47 MGY  175.36 Gycm  Flouro time 20.5 mins      CREATION OF ILIOFEMORAL ARTERY BYPASS Right 3/20/2019    Procedure: CREATION, BYPASS, ARTERIAL, ILIAC TO FEMORAL, RIGHT LOWER EXTREMITY, RIGHT PROFUNDAPLASTY;  Surgeon: Danny Dunbar MD;  Location: [facility];  Service: Vascular;  Laterality: Right;  11:00AM START PER NAZANIN RICHARDS PREOP 3/13/2019  ACT'S, CELL SAVER, GRAFTS, BOOK WATER---NEED H/P  CONSENT IN AM------HGBA1C    CREATION OF MUSCLE ROTATIONAL FLAP Left 3/31/2023    Procedure: CREATION, FLAP, MUSCLE ROTATION;  Surgeon: Brandon Smiley MD;  Location: [facility] OR [address] [place] [address];  Service: Plastics;  Laterality: Left;    ESOPHAGOGASTRODUODENOSCOPY N/A 3/17/2023    Procedure: EGD (ESOPHAGOGASTRODUODENOSCOPY);  Surgeon: Gee Rodriguez MD;  [facility]  Service: Endoscopy;  Laterality: N/A;    ESOPHAGOGASTRODUODENOSCOPY N/A 4/11/2023    Procedure: EGD (ESOPHAGOGASTRODUODENOSCOPY);  Surgeon: Nazanin Kaiser MD;  [facility]  Service: Endoscopy;  Laterality: N/A;    FOOT AMPUTATION THROUGH METATARSAL Right 3/4/2023    Procedure: AMPUTATION, FOOT, TRANSMETATARSAL;  Surgeon: Benjamin Godfrey DPM;  Location: [facility] OR [address] [place] [address];  Service: Podiatry;  Laterality: Right;    HYSTERECTOMY      ?unsure cervix present    INCISION AND DRAINAGE Right 4/12/2019    Procedure: Incision and Drainage - R groin washout, possible muscle flap;  Surgeon: Danny Dunbar MD;  Location: [facility] OR [address] [place] [address];  Service: Vascular;  Laterality: Right;  groin washout    INCISION AND DRAINAGE OF GROIN Right 5/3/2019    Procedure: Incision and Drainage R groin;  Surgeon: Danny Dunbar,  "MD;  Location: [facility] OR [address] [place] [address];  Service: Vascular;  Laterality: Right;    WOUND DEBRIDEMENT Left 3/31/2023    Procedure: DEBRIDEMENT, WOUND;  Surgeon: René Simms MD;  Location: [facility] OR [address] [place] [address];  Service: Vascular;  Laterality: Left;       Review of patient's allergies indicates:   Allergen Reactions    Motrin [ibuprofen] Itching       No current facility-administered medications on file prior to encounter.     Current Outpatient Medications on File Prior to Encounter   Medication Sig    amlodipine (NORVASC) 10 MG tablet Take 10 mg by mouth once daily.    aspirin (ECOTRIN) 81 MG EC tablet Take 81 mg by mouth once daily.    atorvastatin (LIPITOR) 40 MG tablet Take 1 tablet (40 mg total) by mouth every evening.    BLOOD SUGAR DIAGNOSTIC (TRUE METRIX GLUCOSE TEST STRIP MISC) by Misc.(Non-Drug; Combo Route) route.    carvediloL (COREG) 12.5 MG tablet Take 12.5 mg by mouth 2 (two) times daily.    cephALEXin (KEFLEX) 500 MG capsule Take 2 capsules (1,000 mg total) by mouth every 12 (twelve) hours.    clopidogreL (PLAVIX) 75 mg tablet Take 1 tablet (75 mg total) by mouth once daily.    DULoxetine (CYMBALTA) 30 MG capsule Take 1 capsule (30 mg total) by mouth 2 (two) times daily.    furosemide (LASIX) 20 MG tablet Take 1 tablet (20 mg total) by mouth once daily.    insulin aspart protamine-insulin aspart (NOVOLOG MIX 70-30FLEXPEN U-100) 100 unit/mL (70-30) InPn pen Inject 12 Units into the skin 2 (two) times daily before meals.    insulin syringe-needle,dispos. 0.3 mL 30 gauge x 5/16" Syrg by Misc.(Non-Drug; Combo Route) route.    losartan-hydrochlorothiazide 100-25 mg (HYZAAR) 100-25 mg per tablet Take 1 tablet by mouth once daily.    metformin (GLUCOPHAGE) 1000 MG tablet Take 1,000 mg by mouth 2 (two) times daily with meals.    ondansetron (ZOFRAN) 8 MG tablet     oxyCODONE (ROXICODONE) 5 MG immediate release tablet Take 1 tablet (5 mg total) by mouth every 6 (six) hours as needed for Pain.    " "pantoprazole (PROTONIX) 40 MG tablet Take 1 tablet (40 mg total) by mouth 2 (two) times daily.    pen needle, diabetic 32 gauge x 5/32" Ndle Inject 1 each into the skin 4 (four) times daily.     Family History       Problem Relation (Age of Onset)    Diabetes Father    Hypertension Father          Tobacco Use    Smoking status: Former     Types: Cigarettes     Quit date:      Years since quittin.3    Smokeless tobacco: Never   Substance and Sexual Activity    Alcohol use: No    Drug use: No    Sexual activity: Never     Review of Systems   Constitutional:  Negative for activity change, appetite change and fever.   HENT: Negative.     Eyes: Negative.    Respiratory:  Negative for cough, shortness of breath and wheezing.    Cardiovascular:  Negative for chest pain and leg swelling.   Gastrointestinal:  Negative for abdominal pain, constipation, diarrhea, nausea and vomiting.   Endocrine: Negative.    Genitourinary: Negative.    Musculoskeletal:  Positive for arthralgias and myalgias.   Skin:  Positive for color change and wound. Negative for rash.   Allergic/Immunologic: Negative.    Neurological:  Negative for headaches.   Hematological: Negative.    Psychiatric/Behavioral:  Positive for confusion.    Objective:     Vital Signs (Most Recent):  Temp: 98.8 °F (37.1 °C) (23 1106)  Pulse: 84 (23 1130)  Resp: 19 (23 0700)  BP: 133/73 (23 0700)  SpO2: (!) 93 % (23 0700)   Vital Signs (24h Range):  Temp:  [97.5 °F (36.4 °C)-99.3 °F (37.4 °C)] 98.8 °F (37.1 °C)  Pulse:  [76-89] 84  Resp:  [14-29] 19  SpO2:  [93 %-98 %] 93 %  BP: (132-159)/(72-87) 133/73     Weight: 68 kg (150 lb)  Body mass index is 24.96 kg/m².    Physical Exam  Vitals and nursing note reviewed.   Constitutional:       General: She is not in acute distress.     Appearance: She is ill-appearing.   HENT:      Head: Normocephalic and atraumatic.      Mouth/Throat:      Mouth: Mucous membranes are dry.      Pharynx: " Oropharynx is clear.   Eyes:      Extraocular Movements: Extraocular movements intact.      Conjunctiva/sclera: Conjunctivae normal.   Cardiovascular:      Rate and Rhythm: Regular rhythm. Tachycardia present.      Pulses: Normal pulses.   Pulmonary:      Effort: Pulmonary effort is normal. No respiratory distress.      Breath sounds: No wheezing or rales.   Abdominal:      Palpations: Abdomen is soft.      Tenderness: There is no abdominal tenderness. There is no guarding.   Musculoskeletal:      Cervical back: Normal range of motion and neck supple.      Right lower leg: No edema.      Left lower leg: No edema.   Skin:     General: Skin is warm.      Findings: Lesion (R heel, L dorsum with darkened skin) present.   Neurological:      General: No focal deficit present.      Mental Status: Mental status is at baseline.      Comments: Following commands  Able to respond yes/no          Significant Labs: All pertinent labs within the past 24 hours have been reviewed.    Significant Imaging: I have reviewed all pertinent imaging results/findings within the past 24 hours.      Assessment/Plan:      * PAD (peripheral artery disease)  Recently discharged home with IV Cefepime 2g Q12 hours + IV Micafungin 100mg Q24 hours for estimated end of therapy date of 5/16/2023. For R toe amputation. Patient's  reports adherence with IV Abx. L foot with darkening skin changes, which alarmed  as this was how skin presented prior to toe amputations of R foot. RLE warm to touch with L US Lower Extremity Arteries demonstrating diminished flow throughout the left lower extremity. Monophasic waveforms suggesting atherosclerotic disease.     - Podiatry and Vascular Surgery following; appreciate recs  - Continue ASA, atorvastatin, Plavix  - Continue Micafungin and Zosyn per prior ID recs    Debility  - PT/OT reconsulted  - Fall precautions  - Established with  PT/OT/nurse aide prior to hospitalization  - SW assisting in SNF  placement       Skin ulcer of right heel, limited to breakdown of skin  See PVD for plan    - Wound care and podiatry consulted with appreciated mgmt recs      LOLY (acute kidney injury)  Patient with acute kidney injury likely due to IVVD/dehydration LOLY is currently worsening. Labs reviewed- Renal function/electrolytes with Estimated Creatinine Clearance: 15.6 mL/min (A) (based on SCr of 2.5 mg/dL (H)). according to latest data. Monitor urine output and serial BMP and adjust therapy as needed. Avoid nephrotoxins and renally dose meds for GFR listed above.     Cr 2.1 (baseline 0.8-1.5).  Patient dry on physical exam.  Poor oral intake, most likely prerenal in origin.    Recent Labs   Lab 04/23/23  0400 04/24/23  0548    140   K 3.5 3.8    108   CO2 21* 24   BUN 46* 54*   CREATININE 2.1* 2.5*     Improved with fluids initially  - Nephrology consulted; appreciate assistance   - Rivas placed after patient retaining on bladder scan 4/23 with hematuria 4/24  - RP u/s no hydronephrosis  - Urine studies pending  - CMP qd, trend Cr  - strict I/O  - daily weights  - renally dose all medications  - avoid nephrotoxic agents, NSAIDs, IV contrast, ACE/ARB      Acute on chronic anemia  Patient with Hgb 7.5 on admission with baseline 8.6-11.   Initially suspected foot wounds, patient also has leg muscle flap with drain. Low drain output  Hgb 6.6, responded appropiately 7.9 repeat  Rivas placed 4/23; found to have hematuria in rivas 4/24 when Hgb 7.9->7.4    - CBC qd, trend  - transfuse pRBC for Hb < 7 g/dL; transfuse for < 8 if ACS or active bleeding and unresponsive to volume resuscitation with evidence of end-organ ischemia/>20% blood volume loss with HD instablity/>30% blood volume loss      Acute encephalopathy  A&O x3 on admission with waxing/waning per  at the bedside.  says she has been largely bed bound since discharge, but her waxing confusion was concerning to him. Patients mentation  significantly improved after stopping cefepime.    - Repeat CT Head with no acute change  - EEG r/o seizure  - TSH, B12, B1, Vit E, Ammonia, RPR  - Cefepime changed to Zosyn  - F/u blood and Urine cultures      S/P transmetatarsal amputation of foot, right  Appreciated evaluation by Podiatry      Diastolic CHF, chronic  Patient is identified as having Diastolic (HFpEF) heart failure that is Chronic. CHF is currently controlled. Latest ECHO performed and demonstrates- Results for orders placed during the hospital encounter of 02/27/23    Echo    Interpretation Summary  · The estimated ejection fraction is 65%.  · The left ventricle is normal in size with normal systolic function.  · Grade II left ventricular diastolic dysfunction.  · Normal right ventricular size with normal right ventricular systolic function.  · Mild left atrial enlargement.  · Mild-to-moderate mitral regurgitation.  · There is pulmonary hypertension.  · The estimated PA systolic pressure is 55 mmHg.  · Intermediate central venous pressure (8 mmHg).  . Continue Beta Blocker and Furosemide and monitor clinical status closely. Monitor on telemetry. Patient is off CHF pathway.  Monitor strict Is&Os and daily weights.  Place on fluid restriction of 1.5 L. Continue to stress to patient importance of self efficacy and  on diet for CHF. Last BNP reviewed- and noted below No results for input(s): BNP, BNPTRIAGEBLO in the last 168 hours..    - Holding home Lasix in setting of LOLY  - Holding Hyzaar (HCTZ) in setting of LOLY  - Patient breathing comfortably on RA, no signs of volume overload      Type 2 diabetes mellitus with chronic kidney disease, with long-term current use of insulin  Patient's FSGs are uncontrolled due to hyperglycemia on current medication regimen.  Last A1c reviewed-   Lab Results   Component Value Date    HGBA1C 8.2 (H) 02/28/2023     Most recent fingerstick glucose reviewed-   Recent Labs   Lab 04/23/23  1614 04/23/23 2057  04/24/23  0719 04/24/23  1138   POCTGLUCOSE 197* 216* 154* 167*     Current correctional scale  Low  Maintain anti-hyperglycemic dose as follows-   Antihyperglycemics (From admission, onward)    Start     Stop Route Frequency Ordered    04/21/23 0355  insulin aspart U-100 pen 0-5 Units         -- SubQ Before meals & nightly PRN 04/21/23 0255        Hold Oral hypoglycemics while patient is in the hospital.    Essential hypertension  - Continue home Coreg, amlodipine  - hold Hyzaar in setting of LOLY        VTE Risk Mitigation (From admission, onward)         Ordered     heparin (porcine) injection 5,000 Units  Every 8 hours         04/21/23 0254     IP VTE HIGH RISK PATIENT  Once         04/21/23 0254     Place sequential compression device  Until discontinued         04/21/23 0254                Discharge Planning   JUAN ALBERTO: 4/26/2023     Code Status: Full Code   Is the patient medically ready for discharge?: No    Reason for patient still in hospital (select all that apply): Patient trending condition, Consult recommendations and Pending disposition  Discharge Plan A: Home Health (with home infusion)                  Boris Mckeon DO  Department of Hospital Medicine   [facility]

## 2023-04-25 ENCOUNTER — ANESTHESIA EVENT (OUTPATIENT)
Dept: ENDOSCOPY | Facility: HOSPITAL | Age: 83
DRG: 682 | End: 2023-04-25
Payer: MEDICARE

## 2023-04-25 PROBLEM — R40.0 SOMNOLENCE: Status: ACTIVE | Noted: 2023-04-25

## 2023-04-25 LAB
ABO + RH BLD: ABNORMAL
ALBUMIN SERPL BCP-MCNC: 1.9 G/DL (ref 3.5–5.2)
ALBUMIN SERPL ELPH-MCNC: 2.18 G/DL (ref 3.35–5.55)
ALP SERPL-CCNC: 59 U/L (ref 55–135)
ALPHA1 GLOB SERPL ELPH-MCNC: 0.52 G/DL (ref 0.17–0.41)
ALPHA2 GLOB SERPL ELPH-MCNC: 0.84 G/DL (ref 0.43–0.99)
ALT SERPL W/O P-5'-P-CCNC: 6 U/L (ref 10–44)
ANION GAP SERPL CALC-SCNC: 8 MMOL/L (ref 8–16)
AST SERPL-CCNC: 16 U/L (ref 10–40)
B-GLOBULIN SERPL ELPH-MCNC: 1.3 G/DL (ref 0.5–1.1)
BACTERIA UR CULT: NO GROWTH
BASOPHILS # BLD AUTO: 0.03 K/UL (ref 0–0.2)
BASOPHILS # BLD AUTO: 0.03 K/UL (ref 0–0.2)
BASOPHILS NFR BLD: 0.2 % (ref 0–1.9)
BASOPHILS NFR BLD: 0.3 % (ref 0–1.9)
BILIRUB SERPL-MCNC: 1 MG/DL (ref 0.1–1)
BLD GP AB SCN CELLS X3 SERPL QL: ABNORMAL
BLD PROD TYP BPU: NORMAL
BLOOD UNIT EXPIRATION DATE: NORMAL
BLOOD UNIT TYPE CODE: 9500
BLOOD UNIT TYPE: NORMAL
BUN SERPL-MCNC: 52 MG/DL (ref 8–23)
CALCIUM SERPL-MCNC: 8.9 MG/DL (ref 8.7–10.5)
CHLORIDE SERPL-SCNC: 110 MMOL/L (ref 95–110)
CK SERPL-CCNC: 25 U/L (ref 20–180)
CO2 SERPL-SCNC: 25 MMOL/L (ref 23–29)
CODING SYSTEM: NORMAL
CREAT SERPL-MCNC: 2.6 MG/DL (ref 0.5–1.4)
CROSSMATCH INTERPRETATION: NORMAL
DIFFERENTIAL METHOD: ABNORMAL
DIFFERENTIAL METHOD: ABNORMAL
DISPENSE STATUS: NORMAL
EOSINOPHIL # BLD AUTO: 0.6 K/UL (ref 0–0.5)
EOSINOPHIL # BLD AUTO: 0.7 K/UL (ref 0–0.5)
EOSINOPHIL NFR BLD: 4.4 % (ref 0–8)
EOSINOPHIL NFR BLD: 5.7 % (ref 0–8)
ERYTHROCYTE [DISTWIDTH] IN BLOOD BY AUTOMATED COUNT: 14.6 % (ref 11.5–14.5)
ERYTHROCYTE [DISTWIDTH] IN BLOOD BY AUTOMATED COUNT: 14.6 % (ref 11.5–14.5)
EST. GFR  (NO RACE VARIABLE): 17.9 ML/MIN/1.73 M^2
GAMMA GLOB SERPL ELPH-MCNC: 1.05 G/DL (ref 0.67–1.58)
GLUCOSE SERPL-MCNC: 150 MG/DL (ref 70–110)
HCT VFR BLD AUTO: 25.4 % (ref 37–48.5)
HCT VFR BLD AUTO: 26.2 % (ref 37–48.5)
HGB BLD-MCNC: 8.1 G/DL (ref 12–16)
HGB BLD-MCNC: 8.2 G/DL (ref 12–16)
IMM GRANULOCYTES # BLD AUTO: 0.01 K/UL (ref 0–0.04)
IMM GRANULOCYTES # BLD AUTO: 0.03 K/UL (ref 0–0.04)
IMM GRANULOCYTES NFR BLD AUTO: 0.1 % (ref 0–0.5)
IMM GRANULOCYTES NFR BLD AUTO: 0.2 % (ref 0–0.5)
INTERPRETATION SERPL IFE-IMP: NORMAL
KAPPA LC SER QL IA: 13.76 MG/DL (ref 0.33–1.94)
KAPPA LC/LAMBDA SER IA: 1.64 (ref 0.26–1.65)
LAMBDA LC SER QL IA: 8.39 MG/DL (ref 0.57–2.63)
LYMPHOCYTES # BLD AUTO: 1.4 K/UL (ref 1–4.8)
LYMPHOCYTES # BLD AUTO: 1.6 K/UL (ref 1–4.8)
LYMPHOCYTES NFR BLD: 10.9 % (ref 18–48)
LYMPHOCYTES NFR BLD: 13.2 % (ref 18–48)
MAGNESIUM SERPL-MCNC: 1.5 MG/DL (ref 1.6–2.6)
MCH RBC QN AUTO: 26.9 PG (ref 27–31)
MCH RBC QN AUTO: 27.4 PG (ref 27–31)
MCHC RBC AUTO-ENTMCNC: 31.3 G/DL (ref 32–36)
MCHC RBC AUTO-ENTMCNC: 31.9 G/DL (ref 32–36)
MCV RBC AUTO: 86 FL (ref 82–98)
MCV RBC AUTO: 86 FL (ref 82–98)
MONOCYTES # BLD AUTO: 0.4 K/UL (ref 0.3–1)
MONOCYTES # BLD AUTO: 0.6 K/UL (ref 0.3–1)
MONOCYTES NFR BLD: 3.7 % (ref 4–15)
MONOCYTES NFR BLD: 3.9 % (ref 4–15)
NEUTROPHILS # BLD AUTO: 11.8 K/UL (ref 1.8–7.7)
NEUTROPHILS # BLD AUTO: 8 K/UL (ref 1.8–7.7)
NEUTROPHILS NFR BLD: 77 % (ref 38–73)
NEUTROPHILS NFR BLD: 80.4 % (ref 38–73)
NRBC BLD-RTO: 0 /100 WBC
NRBC BLD-RTO: 0 /100 WBC
PATHOLOGIST INTERPRETATION IFE: NORMAL
PATHOLOGIST INTERPRETATION SPE: NORMAL
PHOSPHATE SERPL-MCNC: 3.4 MG/DL (ref 2.7–4.5)
PLATELET # BLD AUTO: 262 K/UL (ref 150–450)
PLATELET # BLD AUTO: 269 K/UL (ref 150–450)
PMV BLD AUTO: 10.4 FL (ref 9.2–12.9)
PMV BLD AUTO: 10.6 FL (ref 9.2–12.9)
POCT GLUCOSE: 154 MG/DL (ref 70–110)
POCT GLUCOSE: 179 MG/DL (ref 70–110)
POCT GLUCOSE: 195 MG/DL (ref 70–110)
POCT GLUCOSE: 203 MG/DL (ref 70–110)
POTASSIUM SERPL-SCNC: 3.6 MMOL/L (ref 3.5–5.1)
PROT SERPL-MCNC: 5.9 G/DL (ref 6–8.4)
PROT SERPL-MCNC: 6.6 G/DL (ref 6–8.4)
RBC # BLD AUTO: 2.96 M/UL (ref 4–5.4)
RBC # BLD AUTO: 3.05 M/UL (ref 4–5.4)
SODIUM SERPL-SCNC: 143 MMOL/L (ref 136–145)
SPECIMEN OUTDATE: ABNORMAL
TRANS ERYTHROCYTES VOL PATIENT: NORMAL ML
WBC # BLD AUTO: 10.42 K/UL (ref 3.9–12.7)
WBC # BLD AUTO: 14.73 K/UL (ref 3.9–12.7)

## 2023-04-25 PROCEDURE — 99233 SBSQ HOSP IP/OBS HIGH 50: CPT | Mod: ,,, | Performed by: INTERNAL MEDICINE

## 2023-04-25 PROCEDURE — 25000003 PHARM REV CODE 250: Performed by: STUDENT IN AN ORGANIZED HEALTH CARE EDUCATION/TRAINING PROGRAM

## 2023-04-25 PROCEDURE — 63600175 PHARM REV CODE 636 W HCPCS: Performed by: STUDENT IN AN ORGANIZED HEALTH CARE EDUCATION/TRAINING PROGRAM

## 2023-04-25 PROCEDURE — 82550 ASSAY OF CK (CPK): CPT | Performed by: HOSPITALIST

## 2023-04-25 PROCEDURE — 99024 PR POST-OP FOLLOW-UP VISIT: ICD-10-PCS | Mod: ,,, | Performed by: PODIATRIST

## 2023-04-25 PROCEDURE — 99222 PR INITIAL HOSPITAL CARE,LEVL II: ICD-10-PCS | Mod: ,,, | Performed by: HOSPITALIST

## 2023-04-25 PROCEDURE — P9021 RED BLOOD CELLS UNIT: HCPCS

## 2023-04-25 PROCEDURE — 11000001 HC ACUTE MED/SURG PRIVATE ROOM

## 2023-04-25 PROCEDURE — 63600175 PHARM REV CODE 636 W HCPCS: Performed by: INTERNAL MEDICINE

## 2023-04-25 PROCEDURE — 99222 1ST HOSP IP/OBS MODERATE 55: CPT | Mod: ,,, | Performed by: HOSPITALIST

## 2023-04-25 PROCEDURE — 99233 SBSQ HOSP IP/OBS HIGH 50: CPT | Mod: ,,, | Performed by: NURSE PRACTITIONER

## 2023-04-25 PROCEDURE — 83735 ASSAY OF MAGNESIUM: CPT

## 2023-04-25 PROCEDURE — 85025 COMPLETE CBC W/AUTO DIFF WBC: CPT

## 2023-04-25 PROCEDURE — 25000003 PHARM REV CODE 250

## 2023-04-25 PROCEDURE — 25000003 PHARM REV CODE 250: Performed by: INTERNAL MEDICINE

## 2023-04-25 PROCEDURE — 99233 PR SUBSEQUENT HOSPITAL CARE,LEVL III: ICD-10-PCS | Mod: 24,,, | Performed by: PODIATRIST

## 2023-04-25 PROCEDURE — 99024 POSTOP FOLLOW-UP VISIT: CPT | Mod: ,,, | Performed by: PODIATRIST

## 2023-04-25 PROCEDURE — 85025 COMPLETE CBC W/AUTO DIFF WBC: CPT | Mod: 91 | Performed by: STUDENT IN AN ORGANIZED HEALTH CARE EDUCATION/TRAINING PROGRAM

## 2023-04-25 PROCEDURE — 99222 1ST HOSP IP/OBS MODERATE 55: CPT | Mod: GC,,, | Performed by: STUDENT IN AN ORGANIZED HEALTH CARE EDUCATION/TRAINING PROGRAM

## 2023-04-25 PROCEDURE — 99233 PR SUBSEQUENT HOSPITAL CARE,LEVL III: ICD-10-PCS | Mod: ,,, | Performed by: NURSE PRACTITIONER

## 2023-04-25 PROCEDURE — 99024 PR POST-OP FOLLOW-UP VISIT: ICD-10-PCS | Mod: ,,, | Performed by: SURGERY

## 2023-04-25 PROCEDURE — 99233 SBSQ HOSP IP/OBS HIGH 50: CPT | Mod: 24,,, | Performed by: PODIATRIST

## 2023-04-25 PROCEDURE — 84100 ASSAY OF PHOSPHORUS: CPT

## 2023-04-25 PROCEDURE — 99222 PR INITIAL HOSPITAL CARE,LEVL II: ICD-10-PCS | Mod: GC,,, | Performed by: STUDENT IN AN ORGANIZED HEALTH CARE EDUCATION/TRAINING PROGRAM

## 2023-04-25 PROCEDURE — 80053 COMPREHEN METABOLIC PANEL: CPT

## 2023-04-25 PROCEDURE — 99024 POSTOP FOLLOW-UP VISIT: CPT | Mod: ,,, | Performed by: SURGERY

## 2023-04-25 PROCEDURE — 63600175 PHARM REV CODE 636 W HCPCS

## 2023-04-25 PROCEDURE — 99233 PR SUBSEQUENT HOSPITAL CARE,LEVL III: ICD-10-PCS | Mod: ,,, | Performed by: INTERNAL MEDICINE

## 2023-04-25 RX ORDER — LIDOCAINE 50 MG/G
1 PATCH TOPICAL DAILY
Status: DISCONTINUED | OUTPATIENT
Start: 2023-04-25 | End: 2023-04-25

## 2023-04-25 RX ORDER — PANTOPRAZOLE SODIUM 40 MG/10ML
80 INJECTION, POWDER, LYOPHILIZED, FOR SOLUTION INTRAVENOUS ONCE
Status: DISCONTINUED | OUTPATIENT
Start: 2023-04-25 | End: 2023-04-25

## 2023-04-25 RX ORDER — MAGNESIUM SULFATE HEPTAHYDRATE 40 MG/ML
2 INJECTION, SOLUTION INTRAVENOUS ONCE
Status: COMPLETED | OUTPATIENT
Start: 2023-04-25 | End: 2023-04-25

## 2023-04-25 RX ORDER — AMMONIUM LACTATE 12 G/100G
LOTION TOPICAL DAILY
Status: DISCONTINUED | OUTPATIENT
Start: 2023-04-25 | End: 2023-05-05 | Stop reason: HOSPADM

## 2023-04-25 RX ORDER — PANTOPRAZOLE SODIUM 40 MG/1
40 TABLET, DELAYED RELEASE ORAL
Status: DISCONTINUED | OUTPATIENT
Start: 2023-04-25 | End: 2023-04-28

## 2023-04-25 RX ADMIN — MICAFUNGIN SODIUM 100 MG: 100 INJECTION, POWDER, LYOPHILIZED, FOR SOLUTION INTRAVENOUS at 09:04

## 2023-04-25 RX ADMIN — PIPERACILLIN SODIUM AND TAZOBACTAM SODIUM 4.5 G: 4; .5 INJECTION, POWDER, FOR SOLUTION INTRAVENOUS at 03:04

## 2023-04-25 RX ADMIN — MUPIROCIN: 20 OINTMENT TOPICAL at 09:04

## 2023-04-25 RX ADMIN — AMLODIPINE BESYLATE 10 MG: 10 TABLET ORAL at 09:04

## 2023-04-25 RX ADMIN — DULOXETINE 30 MG: 30 CAPSULE, DELAYED RELEASE ORAL at 08:04

## 2023-04-25 RX ADMIN — DULOXETINE 30 MG: 30 CAPSULE, DELAYED RELEASE ORAL at 09:04

## 2023-04-25 RX ADMIN — CARVEDILOL 12.5 MG: 12.5 TABLET, FILM COATED ORAL at 09:04

## 2023-04-25 RX ADMIN — ASPIRIN 81 MG: 81 TABLET, COATED ORAL at 09:04

## 2023-04-25 RX ADMIN — HEPARIN SODIUM 5000 UNITS: 5000 INJECTION INTRAVENOUS; SUBCUTANEOUS at 05:04

## 2023-04-25 RX ADMIN — OXYCODONE HYDROCHLORIDE 5 MG: 5 TABLET ORAL at 10:04

## 2023-04-25 RX ADMIN — ATORVASTATIN CALCIUM 40 MG: 40 TABLET, FILM COATED ORAL at 08:04

## 2023-04-25 RX ADMIN — PANTOPRAZOLE SODIUM 40 MG: 40 TABLET, DELAYED RELEASE ORAL at 04:04

## 2023-04-25 RX ADMIN — PANTOPRAZOLE SODIUM 40 MG: 40 TABLET, DELAYED RELEASE ORAL at 09:04

## 2023-04-25 RX ADMIN — MAGNESIUM SULFATE 2 G: 2 INJECTION INTRAVENOUS at 09:04

## 2023-04-25 RX ADMIN — CLOPIDOGREL BISULFATE 75 MG: 75 TABLET ORAL at 09:04

## 2023-04-25 RX ADMIN — PIPERACILLIN SODIUM AND TAZOBACTAM SODIUM 4.5 G: 4; .5 INJECTION, POWDER, FOR SOLUTION INTRAVENOUS at 04:04

## 2023-04-25 RX ADMIN — Medication: at 11:04

## 2023-04-25 RX ADMIN — CARVEDILOL 12.5 MG: 12.5 TABLET, FILM COATED ORAL at 08:04

## 2023-04-25 NOTE — ASSESSMENT & PLAN NOTE
Concern for cefepime neurotoxicity.   · Transitioned to zosyn 4/22 with improvement in mental status today.

## 2023-04-25 NOTE — ASSESSMENT & PLAN NOTE
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 Albicans, C. Glabrata, and prevotella) on 6 week course of outpatient cefepime and micafungin (EOC 5/16/23), admitted with left foot pain and skin changes concerning for necrosis, LOLY,  and encephalopathy possibly 2/2 cefepime neurotoxicity.       Transitioned to zosyn in lieu of cefepime on 4/22 with improvement in mental status today, but noted to have worsening renal function despite fluids. [place] placed.  Renal US negative for hydronephrosis  Nephrology consulted. Blood cultures NGTD.  Urine culture pending. Blood cultures NGTD.       Continue Zosyn IV renally dosed for now.   Continue IV micafungin.   Follow up urine studies.   Will follow up tomorrow.

## 2023-04-25 NOTE — ASSESSMENT & PLAN NOTE
S/p TMA 3/4/23 with wound dehiscence 3/10, cx + for Pseudomonas, s/p 14 days of oral cipro, noted to  have ischemic changes to right TMA site and left forefoot.  Podiatry has been following.  No clinical signs of infection.  No further Vascular intervention planned.       -  Continue Wound Care as recommended by Podiatry

## 2023-04-25 NOTE — PROGRESS NOTES
Wound care follow up visit performed for reassessment of left groin site. Current orders for woun'dres to woundbed and cover with mepilex.    With assessment, removed previous dressing noting macerated/excoriated inferior carie wound    Increase in yellow slough to inner edges of wound.  Increase in yellow slough by 3-5%. Muscle flap remains perfused with moist healthy surface. Noted white barrier cream to wound and carie wound.  Cleaned gently with coloplast easicleans wipes to remove.     Wound care modified:  Apply cavillion no sting liquid barrier film to periwound. Allow to dry. Apply urgotul ag to wound bed and apply woun'dres ontop. Bolster wound bed by adding 4x4 gauze and cover with mepilex border.  Discussed findings and recommendations with primary team.  Will continue to follow closely.    Staff nurse to perform wound care on non wound care nurse visit days.      WC visit 4/24/23    WC 4/21/23

## 2023-04-25 NOTE — SUBJECTIVE & OBJECTIVE
Interval History:   Mental status remains much improved.  Family at bedside today  WBC 14 today  Unit of blood overnight.   Melanotic stools. GI Consulted. EGD tomorrow   Primary complaint is left foot pain    Review of Systems   Constitutional:  Positive for activity change. Negative for appetite change, chills and fever.   Respiratory:  Negative for cough and shortness of breath.    Cardiovascular:  Negative for chest pain.   Gastrointestinal:  Positive for blood in stool and diarrhea. Negative for abdominal pain, constipation, nausea and vomiting.   Genitourinary:  Positive for difficulty urinating.   Musculoskeletal:  Positive for arthralgias and gait problem.   Skin:  Positive for wound.   Neurological:  Negative for dizziness, weakness and headaches.   Psychiatric/Behavioral:  Negative for confusion. The patient is not nervous/anxious.    Objective:     Vital Signs (Most Recent):  Temp: 99 °F (37.2 °C) (04/25/23 1549)  Pulse: 79 (04/25/23 1523)  Resp: 18 (04/25/23 1052)  BP: (!) 143/75 (04/25/23 0900)  SpO2: 96 % (04/25/23 0328) Vital Signs (24h Range):  Temp:  [97.6 °F (36.4 °C)-99 °F (37.2 °C)] 99 °F (37.2 °C)  Pulse:  [67-87] 79  Resp:  [15-19] 18  SpO2:  [95 %-98 %] 96 %  BP: (131-156)/(62-75) 143/75     Weight: 68 kg (150 lb)  Body mass index is 24.96 kg/m².    Estimated Creatinine Clearance: 15 mL/min (A) (based on SCr of 2.6 mg/dL (H)).    Physical Exam  Vitals and nursing note reviewed.   Constitutional:       General: She is not in acute distress.     Appearance: She is well-developed. She is not toxic-appearing or diaphoretic.   HENT:      Head: Normocephalic and atraumatic.      Mouth/Throat:      Mouth: Mucous membranes are moist.   Eyes:      General: No scleral icterus.     Conjunctiva/sclera: Conjunctivae normal.   Cardiovascular:      Rate and Rhythm: Normal rate and regular rhythm.      Heart sounds: Normal heart sounds.   Pulmonary:      Effort: Pulmonary effort is normal. No respiratory  distress.      Breath sounds: No wheezing.   Abdominal:      General: Bowel sounds are normal. There is no distension.      Palpations: Abdomen is soft.      Tenderness: There is no abdominal tenderness.   Genitourinary:     Comments: Paniagua - urine dark, hematuria  Musculoskeletal:         General: No tenderness. Normal range of motion.   Skin:     General: Skin is warm and dry.      Comments: Dark skin discoloration over the dorsal aspect of the left foot.    Right foot TMA site c/d/i  Podiatry photos reviewed.  See below    Left groin dressing c/d/I.  Drain in place.  Staples in place   Neurological:      Mental Status: She is alert and oriented to person, place, and time.      Comments: Alert, oriented X 3. Answers all questions appropriately   Psychiatric:         Mood and Affect: Mood normal.         Behavior: Behavior normal.             Significant Labs: Blood Culture:   Recent Labs   Lab 03/08/23  2325 03/10/23  0942 03/10/23  0943 03/29/23 2009 04/20/23 2057   LABBLOO No growth after 5 days.  No growth after 5 days. No growth after 5 days. No growth after 5 days. No growth after 5 days.  No growth after 5 days. No Growth to date  No Growth to date  No Growth to date  No Growth to date  No Growth to date  No Growth to date  No Growth to date  No Growth to date  No Growth to date  No Growth to date       BMP:   Recent Labs   Lab 04/25/23  0455   *      K 3.6      CO2 25   BUN 52*   CREATININE 2.6*   CALCIUM 8.9   MG 1.5*       CBC:   Recent Labs   Lab 04/24/23  0548 04/24/23  1956 04/25/23  0455   WBC 9.95 10.87 14.73*   HGB 7.4* 6.6* 8.2*   HCT 24.1* 21.5* 26.2*    281 269       Urine Culture: No results for input(s): LABURIN in the last 4320 hours.  Urine Studies:   Recent Labs   Lab 04/23/23  1116 04/24/23  1502   COLORU Straw  Straw [place]*   APPEARANCEUA Cloudy*  Cloudy* Ex.Turbid   PHUR 6.0  6.0 6.0   SPECGRAV 1.015  1.015 1.015   PROTEINUA 1+*  1+* 2+*    GLUCUA Negative  Negative Negative   KETONESU Trace*  Trace* Trace*   BILIRUBINUA Negative  Negative Negative   OCCULTUA 1+*  1+* 3+*   NITRITE Negative  Negative Negative   LEUKOCYTESUR Negative  Negative 3+*   RBCUA 4 >100*   WBCUA 0 >100*   BACTERIA Rare Moderate*   SQUAMEPITHEL 1  --    HYALINECASTS 0 0       Wound Culture:   Recent Labs   Lab 03/04/23  1141 03/10/23  1340 03/31/23  0805 03/31/23  0809   LABAERO No growth PSEUDOMONAS FLUORESCENS GROUP  Few  * FELI ALBICANS  Rare  * FELI GLABRATA  Rare  *         Significant Imaging: I have reviewed all pertinent imaging results/findings within the past 24 hours.

## 2023-04-25 NOTE — PROGRESS NOTES
[facility] Medicine  Progress Note    Patient Name: Nelsy Marino  MRN: [id number]  Patient Class: IP- Inpatient   Admission Date: 4/20/2023  Length of Stay: 3 days  Attending Physician: Kay Benavidez MD  Primary Care Provider: Juan F Garay        Subjective:     Principal Problem:Vascular inflammation or infection        HPI:  This is an 82-year-old female with a past medical history of severe PAD, hypertension, type 2 diabetes, HFpEF (EF 65%), L femoral bypass and L leg flap, R toe ampuations 2 months prior with presenting after  noted waxing/waning confusion since her discharge from hospital two weeks ago. History largely provided by  at bedside as patient is intermittently somnolent. She was discharged on IV Cefepime and Micafungin for her R foot amputation and patient reports adherence with regimen at home. He grew concerned that int he past two weeks she had worsening LLE pain and darkened skin, which is what happened before her R toes were amputated. His niece contacted a nurse who recommneded ED evaluation. No fevers, chills, HA, chest pain, SOB, N/V/D, abdominal pain.    Pt receives  nurse aide, PT/OT since hospital DC and ID very recently made a referral to podiatry and wound care, but appointments not yet made.     In the ED patient was hemodynamically stable and hypertensive, intermittently on RA to 2L NC. Leukocytosis to 13, Hgb 7.5, Cr 2.1, Albumin 2.1, BCx in process, CT head and CXR wnl. Started on BS Abx with continued Cefepime, [place], started on Vanc pending Bcx, US LE arterial with diminished flow throughout the left lower extremity. Monophasic waveforms suggesting atherosclerotic disease. Admitted to  for LOLY and Podiatry/Wound care.       Overview/Hospital Course:  Admitted to hospital medicine for LOLY and left foot pain. Cr on admission 2.1 (bl.8-1.5), improved with IVF initially. After holding IVF Cr increased, and then unresponsive to fluids.  Patient with significant urine retention, rivas placed and Cr again increased. Consulted Nephrology for assistance. Vascular surgery consulted, recommended repeat ARCHIE and left foot arterial doppler. ID consulted, patient was receiving cefepime and micafungin outpatient per ID following R foot transmetatarsal amputation. Started on vanc, cefepime, and micafungin. Podiatry and wound care consulted. Patient more lethargic 4/22 although able to follow commands and move extremities on exam. With concern for cefepime toxicity, ID removed cefepime and vancomycin. Micafungin and zosyn continued. EEG and CTH ordered. CTH no acute abnormalities. Patient significantly improved following d/c Cefepime. Continued on IVF for LOLY and rivas placed for urinary retention. SLP consulted recommended pureed dysphagia diet.      Interval History: This morning patient continued to have melena. Hemodynamically stable. She received 1 unit of blood overnight. Reports continued pain of foot.     Review of Systems   Constitutional:  Negative for activity change, appetite change and fever.   HENT:  Negative for congestion and sore throat.    Respiratory:  Negative for cough, shortness of breath and wheezing.    Cardiovascular:  Negative for chest pain and leg swelling.   Gastrointestinal:  Negative for abdominal pain, constipation, diarrhea, nausea and vomiting.   Genitourinary:  Negative for difficulty urinating and dysuria.   Musculoskeletal:  Positive for arthralgias and myalgias.   Skin:  Positive for color change and wound. Negative for rash.   Allergic/Immunologic: Negative.    Neurological:  Negative for dizziness and headaches.   Hematological: Negative.    Objective:     Vital Signs (Most Recent):  Temp: 97.6 °F (36.4 °C) (04/25/23 1109)  Pulse: 82 (04/25/23 1153)  Resp: 18 (04/25/23 1052)  BP: (!) 143/75 (04/25/23 0900)  SpO2: 96 % (04/25/23 0328)   Vital Signs (24h Range):  Temp:  [97.6 °F (36.4 °C)-99 °F (37.2 °C)] 97.6 °F (36.4  °C)  Pulse:  [67-87] 82  Resp:  [15-19] 18  SpO2:  [95 %-98 %] 96 %  BP: (131-156)/(62-75) 143/75     Weight: 68 kg (150 lb)  Body mass index is 24.96 kg/m².    Intake/Output Summary (Last 24 hours) at 4/25/2023 1354  Last data filed at 4/25/2023 1129  Gross per 24 hour   Intake 219 ml   Output 950 ml   Net -731 ml      Physical Exam  Vitals and nursing note reviewed.   Constitutional:       General: She is not in acute distress.     Appearance: She is not ill-appearing.   HENT:      Head: Normocephalic and atraumatic.      Mouth/Throat:      Mouth: Mucous membranes are moist.      Pharynx: Oropharynx is clear.   Eyes:      Extraocular Movements: Extraocular movements intact.      Conjunctiva/sclera: Conjunctivae normal.   Cardiovascular:      Rate and Rhythm: Regular rhythm. Tachycardia present.      Pulses: Normal pulses.   Pulmonary:      Effort: Pulmonary effort is normal. No respiratory distress.      Breath sounds: No wheezing or rales.   Abdominal:      Palpations: Abdomen is soft.      Tenderness: There is no abdominal tenderness. There is no guarding.   Genitourinary:     Comments: Black stool on diaper  Musculoskeletal:      Cervical back: Normal range of motion and neck supple.      Right lower leg: No edema.      Left lower leg: No edema.   Skin:     General: Skin is warm.      Findings: Lesion (R heel, L dorsum with darkened skin) present.      Comments: Insicion on right inner thigh- c/d/I- no drainage or erythema seen.  Wound on left thigh- with dressing on top and drain in place with serosanguinous fluid.    Neurological:      General: No focal deficit present.      Mental Status: Mental status is at baseline.   Psychiatric:         Mood and Affect: Mood normal.       Significant Labs: All pertinent labs within the past 24 hours have been reviewed.  CBC:   Recent Labs   Lab 04/24/23  0548 04/24/23  1956 04/25/23  0455   WBC 9.95 10.87 14.73*   HGB 7.4* 6.6* 8.2*   HCT 24.1* 21.5* 26.2*    281  269     CMP:   Recent Labs   Lab 04/24/23  0548 04/25/23  0455    143   K 3.8 3.6    110   CO2 24 25   * 150*   BUN 54* 52*   CREATININE 2.5* 2.6*   CALCIUM 9.2 8.9   PROT 6.6 6.6   ALBUMIN 1.8* 1.9*   BILITOT 0.4 1.0   ALKPHOS 65 59   AST 17 16   ALT <5* 6*   ANIONGAP 8 8       Significant Imaging: I have reviewed all pertinent imaging results/findings within the past 24 hours.      Assessment/Plan:      Debility  - PT/OT reconsulted  - Fall precautions  - Established with HH PT/OT/nurse aide prior to hospitalization  - SW assisting in SNF placement       Skin ulcer of right heel, limited to breakdown of skin  See PVD for plan    - Wound care and podiatry consulted with appreciated mgmt recs      LOLY (acute kidney injury)  Patient with acute kidney injury likely due to IVVD/dehydration LOLY is currently worsening. Labs reviewed- Renal function/electrolytes with Estimated Creatinine Clearance: 15 mL/min (A) (based on SCr of 2.6 mg/dL (H)). according to latest data. Monitor urine output and serial BMP and adjust therapy as needed. Avoid nephrotoxins and renally dose meds for GFR listed above.     Cr 2.1 (baseline 0.8-1.5).  Patient dry on physical exam.  Poor oral intake, most likely prerenal in origin.    Recent Labs   Lab 04/24/23  0548 04/25/23  0455    143   K 3.8 3.6    110   CO2 24 25   BUN 54* 52*   CREATININE 2.5* 2.6*     Improved with fluids initially  - Nephrology consulted; appreciate assistance   - Paniagua placed after patient retaining on bladder scan 4/23 with hematuria 4/24  - RP u/s no hydronephrosis  - Urine lytes showed possible intrinsic etiology  - CMP qd, trend Cr  - strict I/O  - daily weights  - renally dose all medications  - avoid nephrotoxic agents, NSAIDs, IV contrast, ACE/ARB      Acute on chronic anemia  Baseline 8.6-11.   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  Patient continues to have melena and requiring blood transfusion    - GI consulted, plan for push enteroscopy tomorrow. NPO at MN  - Continue PPI BID  - Maintain 2 large bore Ivs for volume resuscitation  - Trend CBC BID. Transfuse pRBC for Hb < 7 g/dL  - Stopped Heparin for DVT ppx and ASA. Will continue with only plavix for now.        Acute encephalopathy  A&O x3 on admission with waxing/waning per  at the bedside.  says she has been largely bed bound since discharge, but her waxing confusion was concerning to him. Patients mentation significantly improved after stopping cefepime.    - Repeat CT Head with no acute change  - EEG without seizures  - TSH, B12, B1, Vit E, Ammonia, RPR were unremarkable  - Cefepime changed to Zosyn  - F/u blood and Urine cultures      S/P transmetatarsal amputation of foot, right  Appreciated evaluation by Podiatry      Diastolic CHF, chronic  Patient is identified as having Diastolic (HFpEF) heart failure that is Chronic. CHF is currently controlled. Latest ECHO performed and demonstrates- Results for orders placed during the hospital encounter of 02/27/23    Echo    Interpretation Summary  · The estimated ejection fraction is 65%.  · The left ventricle is normal in size with normal systolic function.  · Grade II left ventricular diastolic dysfunction.  · Normal right ventricular size with normal right ventricular systolic function.  · Mild left atrial enlargement.  · Mild-to-moderate mitral regurgitation.  · There is pulmonary hypertension.  · The estimated PA systolic pressure is 55 mmHg.  · Intermediate central venous pressure (8 mmHg).  . Continue Beta Blocker and Furosemide and monitor clinical status closely.  Monitor on telemetry. Patient is off CHF pathway.  Monitor strict Is&Os and daily weights.  Place on fluid restriction of 1.5 L. Continue to stress to patient importance of self efficacy and  on diet for CHF. Last BNP reviewed- and noted below No results for input(s): BNP, BNPTRIAGEBLO in the last 168 hours..    - Holding home Lasix in setting of LOLY  - Holding Hyzaar (HCTZ) in setting of LOLY  - Patient breathing comfortably on RA, no signs of volume overload      Type 2 diabetes mellitus with chronic kidney disease, with long-term current use of insulin  Patient's FSGs are uncontrolled due to hyperglycemia on current medication regimen.  Last A1c reviewed-   Lab Results   Component Value Date    HGBA1C 8.2 (H) 02/28/2023     Most recent fingerstick glucose reviewed-   Recent Labs   Lab 04/23/23  1614 04/23/23  2057 04/24/23  0719 04/24/23  1138   POCTGLUCOSE 197* 216* 154* 167*     Current correctional scale  Low  Maintain anti-hyperglycemic dose as follows-   Antihyperglycemics (From admission, onward)    Start     Stop Route Frequency Ordered    04/21/23 0355  insulin aspart U-100 pen 0-5 Units         -- SubQ Before meals & nightly PRN 04/21/23 0255        Hold Oral hypoglycemics while patient is in the hospital.    Essential hypertension  - Continue home Coreg, amlodipine  - hold Hyzaar in setting of LOLY      PAD (peripheral artery disease)  Recently discharged home with IV Cefepime 2g Q12 hours + IV Micafungin 100mg Q24 hours for estimated end of therapy date of 5/16/2023. For R toe amputation. Patient's  reports adherence with IV Abx. L foot with darkening skin changes, which alarmed  as this was how skin presented prior to toe amputations of R foot. RLE warm to touch with L US Lower Extremity Arteries demonstrating diminished flow throughout the left lower extremity. Monophasic waveforms suggesting atherosclerotic disease.     - Podiatry and Vascular Surgery following; appreciate recs  -  Continue ASA, atorvastatin, Plavix  - Continue Micafungin and Zosyn per prior ID recs      VTE Risk Mitigation (From admission, onward)         Ordered     IP VTE HIGH RISK PATIENT  Once         04/21/23 0254     Place sequential compression device  Until discontinued         04/21/23 0254                Discharge Planning   JUAN ALBERTO: 4/28/2023     Code Status: Full Code   Is the patient medically ready for discharge?: No    Reason for patient still in hospital (select all that apply): Treatment  Discharge Plan A: Home Health (with home infusion)                  Geneva Turk DO  Department of Hospital Medicine   [facility] Surg

## 2023-04-25 NOTE — ASSESSMENT & PLAN NOTE
Assessment:   -Right foot s/p TMA with eschar at TMA site, also with partial thickness breakdown of heel. Not clinically infected. X ray unremarkable.  -Left foot with recent blister and ischemic changes. Not clinically infected. X ray unremarkable.  -Severe PAD with B ARCHIE 2.01, prior revascularizations, no further intervention planned by vascular surgery.    Plan:  -No intervention planned by podiatry, OK for discharge from podiatry perspective.  -Appreciate ID recs, feet do not appear infected.  -Appreciate vascular recs, no further revascularization at this time.  -Daily betadine paint to eschars and dark areas of both feet, daily Amlactin to dry scaling areas of both feet.   -Offload bilateral heels in Z flex heel protector boots at all times while in bed or chair.   -BLE WBAT in surgical shoes for transfers or short distances.   -Podiatry will follow.   DC Instructions: Please order ambulatory referral to podiatry (Short). BLE WBAT in surgical shoes. Daily betadine paint to ischemic, dark, necrotic areas of both feet.

## 2023-04-25 NOTE — SUBJECTIVE & OBJECTIVE
Interval History:   Mental status much improved. Alert/conversant/appropriate  Renal function deteriorating - Nephrology consulted.   Podiatry signed off     Review of Systems   Constitutional:  Positive for activity change. Negative for appetite change, chills and fever.   Respiratory:  Negative for cough and shortness of breath.    Cardiovascular:  Negative for chest pain.   Gastrointestinal:  Negative for abdominal pain, constipation, diarrhea, nausea and vomiting.   Genitourinary:  Positive for difficulty urinating.   Musculoskeletal:  Positive for arthralgias and gait problem.   Skin:  Positive for wound.   Neurological:  Negative for dizziness, weakness and headaches.   Psychiatric/Behavioral:  Negative for confusion. The patient is not nervous/anxious.    Objective:     Vital Signs (Most Recent):  Temp: 98.1 °F (36.7 °C) (04/24/23 2013)  Pulse: 81 (04/24/23 2013)  Resp: 16 (04/24/23 2013)  BP: (!) 154/67 (04/24/23 2013)  SpO2: 95 % (04/24/23 2013) Vital Signs (24h Range):  Temp:  [98.1 °F (36.7 °C)-99.3 °F (37.4 °C)] 98.1 °F (36.7 °C)  Pulse:  [76-87] 81  Resp:  [15-29] 16  SpO2:  [93 %-98 %] 95 %  BP: (132-159)/(67-87) 154/67     Weight: 68 kg (150 lb)  Body mass index is 24.96 kg/m².    Estimated Creatinine Clearance: 15.6 mL/min (A) (based on SCr of 2.5 mg/dL (H)).    Physical Exam  Vitals and nursing note reviewed.   Constitutional:       General: She is not in acute distress.     Appearance: She is well-developed. She is not toxic-appearing or diaphoretic.   HENT:      Head: Normocephalic and atraumatic.      Mouth/Throat:      Mouth: Mucous membranes are moist.   Eyes:      General: No scleral icterus.     Conjunctiva/sclera: Conjunctivae normal.   Cardiovascular:      Rate and Rhythm: Normal rate and regular rhythm.      Heart sounds: Normal heart sounds.   Pulmonary:      Effort: Pulmonary effort is normal. No respiratory distress.      Breath sounds: No wheezing.   Abdominal:      General: Bowel  sounds are normal. There is no distension.      Palpations: Abdomen is soft.      Tenderness: There is no abdominal tenderness.   Genitourinary:     Comments: Paniagua - urine dark, hematuria  Musculoskeletal:         General: No tenderness. Normal range of motion.   Skin:     General: Skin is warm and dry.      Comments: Dark skin discoloration over the dorsal aspect of the left foot.    Right foot covered dressed.   Podiatry photos reviewed.  See below    Left groin dressing c/d/I.  Drain in place   Neurological:      Mental Status: She is alert and oriented to person, place, and time.      Comments: Alert, oriented X 3. Answers all questions appropriately   Psychiatric:         Mood and Affect: Mood normal.         Behavior: Behavior normal.             Significant Labs: Blood Culture:   Recent Labs   Lab 03/08/23  2325 03/10/23  0942 03/10/23  0943 03/29/23 2009 04/20/23 2057   LABBLOO No growth after 5 days.  No growth after 5 days. No growth after 5 days. No growth after 5 days. No growth after 5 days.  No growth after 5 days. No Growth to date  No Growth to date  No Growth to date  No Growth to date  No Growth to date  No Growth to date  No Growth to date  No Growth to date       BMP:   Recent Labs   Lab 04/24/23  0548   *      K 3.8      CO2 24   BUN 54*   CREATININE 2.5*   CALCIUM 9.2   MG 1.6       CBC:   Recent Labs   Lab 04/23/23  1730 04/24/23  0548 04/24/23  1956   WBC 11.56 9.95 10.87   HGB 7.9* 7.4* 6.6*   HCT 24.9* 24.1* 21.5*    317 281       Urine Culture: No results for input(s): LABURIN in the last 4320 hours.  Urine Studies:   Recent Labs   Lab 04/23/23  1116 04/24/23  1502   COLORU Straw  Straw [place]*   APPEARANCEUA Cloudy*  Cloudy* Ex.Turbid   PHUR 6.0  6.0 6.0   SPECGRAV 1.015  1.015 1.015   PROTEINUA 1+*  1+* 2+*   GLUCUA Negative  Negative Negative   KETONESU Trace*  Trace* Trace*   BILIRUBINUA Negative  Negative Negative   OCCULTUA 1+*  1+* 3+*    NITRITE Negative  Negative Negative   LEUKOCYTESUR Negative  Negative 3+*   RBCUA 4 >100*   WBCUA 0 >100*   BACTERIA Rare Moderate*   SQUAMEPITHEL 1  --    HYALINECASTS 0 0       Wound Culture:   Recent Labs   Lab 03/04/23  1141 03/10/23  1340 03/31/23  0805 03/31/23  0809   LABAERO No growth PSEUDOMONAS FLUORESCENS GROUP  Few  * FELI ALBICANS  Rare  * FELI GLABRATA  Rare  *         Significant Imaging: I have reviewed all pertinent imaging results/findings within the past 24 hours.

## 2023-04-25 NOTE — PLAN OF CARE
Patient denied by both [facility] & [facility] - spoke with daughter Ashwin - additional SNF referrals forwarded via [facility]

## 2023-04-25 NOTE — CONSULTS
[facility]  Gastroenterology  Consult Note    Patient Name: Nelsy Marino  MRN: [id number]  Admission Date: 4/20/2023  Hospital Length of Stay: 3 days  Code Status: Full Code   Attending Provider: Kay Benavidez MD   Consulting Provider: Damion Schrader MD  Primary Care Physician: Juan F Garay  Principal Problem:Vascular inflammation or infection    Inpatient consult to Gastroenterology  Consult performed by: Damion Schrader MD  Consult ordered by: Geneva Turk DO      Subjective:     HPI: Nelsy Marino is a 82 y.o. female with history of PAD s/p L fem bypass and L leg flag, R toe amputations, HTN, DM2, HFpEF here for necrotic left foot and progressive confusion. Plastic surgery and podiatry consulted, no plans for further surgical management at this time. GI consulted for need for recurrent transfusions in setting of known AVMs. Hgb 6.5 >1u . 7.9 > 7.4 > 6.6 > 1u > 8.2 over past 48h. Melena reported. Has worsening LOLY. On DAPT with ASA/plavix. On zosyn and micafungin for L groin wound infection.    EGD 4/11 showed three non-bleeding gastric AVMs and duodenal bulb AVM requiring treatment with APC.            Objective:     Vitals:    04/25/23 0900   BP: (!) 143/75   Pulse:    Resp:    Temp:          Constitutional:  not in acute distress and well developed  HENT: Head: Normal, normocephalic, atraumatic.  Eyes: conjunctiva clear and sclera nonicteric  GI: soft, non-tender, without masses or organomegaly  Musculoskeletal: no muscular tenderness noted  Skin: normal color  Neurological: alert, oriented x3  Psychiatric: mood and affect are within normal limits, pt is a good historian; no memory problems were noted  Rectal:       Significant Labs:  Reviewed the following pertinent laboratory tests: Hgb 6.6.     Significant Imaging:  No pertinent imaging.      Assessment/Plan:     Nelsy Marino is a 82 y.o. female with history of PAD s/p L fem bypass and L leg flag, R toe amputations,  HTN, DM2, HFpEF here for necrotic left foot and progressive confusion. GI consulted for melena and downtrending Hgb with transfusion requirement. Hemodynamically stable. Has known AVMs in stomach and duodenum on recent EGD. Plan for push enteroscopy to assess for more distal AVMs.    Problem List:  Melena  AVMs    Recommendations:  - Plan for push enteroscopy 4/26  - Trend Hgb q12 hrs. Transfuse for Hgb <7, unless otherwise indicated  - Maintain IV access with 2 large bore Ivs  - Intravascular resuscitation/support with IVFs   - NPO at midnight  - Hold all NSAIDs and anticoagulants, unless contraindicated  - Bolus IV pantoprazole 80mg followed by 40mg BID  - Please correct any coagulopathy with platelets and FFP for goal of platelets >50K and INR <2.0  - Please notify GI team if there is significant change in patient's clinical status      Thank you for involving us in the care of Nelsy Marino. Please call with any additional questions, concerns or changes in the patient's clinical status. We will continue to follow.     Damion Schrader MD  Gastroenterology Fellow PGY IV  [facility]

## 2023-04-25 NOTE — SUBJECTIVE & OBJECTIVE
Interval History:   No acute events overnight. One unit pRBCs transfused on 4/24/23; Hgb increased from 6.6 to 8.2. Pt remained afebrile and hemodyanically stable following the transfusion. Urine output in past 24hrs at 1100ml.    Review of patient's allergies indicates:   Allergen Reactions    Motrin [ibuprofen] Itching     Current Facility-Administered Medications   Medication Frequency    0.9%  NaCl infusion (for blood administration) Q24H PRN    amLODIPine tablet 10 mg Daily    ammonium lactate 12 % lotion Daily    atorvastatin tablet 40 mg QHS    carvediloL tablet 12.5 mg BID    clopidogreL tablet 75 mg Daily    dextrose 10% bolus 125 mL 125 mL PRN    dextrose 10% bolus 250 mL 250 mL PRN    dextrose 40 % gel 15,000 mg PRN    dextrose 40 % gel 30,000 mg PRN    DULoxetine DR capsule 30 mg BID    glucagon (human recombinant) injection 1 mg PRN    HYDROmorphone injection 0.5 mg Q6H PRN    insulin aspart U-100 pen 0-5 Units QID (AC + HS) PRN    micafungin 100 mg in sodium chloride 0.9 % 100 mL IVPB (MB+) Q24H    mupirocin 2 % ointment BID    naloxone 0.4 mg/mL injection 0.02 mg PRN    ondansetron injection 4 mg Q6H PRN    oxyCODONE immediate release tablet 5 mg Q4H PRN    oxyCODONE immediate release tablet Tab 10 mg Q4H PRN    pantoprazole EC tablet 40 mg BID AC    piperacillin-tazobactam (ZOSYN) 4.5 g in dextrose 5 % in water (D5W) 5 % 100 mL IVPB (MB+) Q12H    sodium chloride 0.9% flush 10 mL Q12H PRN       Objective:     Review of Systems   Constitutional:  Positive for malaise/fatigue. Negative for chills and fever.   HENT:  Negative for congestion, hearing loss, sinus pain and sore throat.    Eyes:  Negative for blurred vision, photophobia and redness.   Respiratory:  Negative for cough, shortness of breath and wheezing.    Cardiovascular:  Negative for chest pain, palpitations and leg swelling.   Gastrointestinal:  Negative for blood in stool, constipation, diarrhea, nausea and vomiting.   Genitourinary:   Negative for dysuria and flank pain.   Musculoskeletal:  Positive for myalgias. Negative for falls and joint pain.   Skin:  Negative for itching and rash.   Neurological:  Negative for dizziness, tingling, weakness and headaches.   Psychiatric/Behavioral:  Negative for depression. The patient is not nervous/anxious.       Vital Signs (Most Recent):  Temp: 97.6 °F (36.4 °C) (04/25/23 1109)  Pulse: 82 (04/25/23 1153)  Resp: 18 (04/25/23 1052)  BP: (Abnormal) 143/75 (04/25/23 0900)  SpO2: 96 % (04/25/23 0328)   Vital Signs (24h Range):  Temp:  [97.6 °F (36.4 °C)-99 °F (37.2 °C)] 97.6 °F (36.4 °C)  Pulse:  [67-87] 82  Resp:  [15-19] 18  SpO2:  [95 %-98 %] 96 %  BP: (131-156)/(62-75) 143/75     Weight: 68 kg (150 lb) (04/20/23 2035)  Body mass index is 24.96 kg/m².  Body surface area is 1.77 meters squared.    I/O last 3 completed shifts:  In: 219 [Blood:219]  Out: 426.5 [Urine:425; Drains:1.5]    Physical Exam  Vitals and nursing note reviewed.   Constitutional:       General: She is not in acute distress.     Appearance: She is not ill-appearing.   HENT:      Head: Normocephalic and atraumatic.      Right Ear: External ear normal.      Left Ear: External ear normal.      Nose: No congestion.      Mouth/Throat:      Pharynx: Oropharynx is clear.   Eyes:      General:         Right eye: No discharge.         Left eye: No discharge.      Extraocular Movements: Extraocular movements intact.      Conjunctiva/sclera: Conjunctivae normal.   Cardiovascular:      Rate and Rhythm: Normal rate and regular rhythm.      Pulses: Normal pulses.      Heart sounds: Normal heart sounds.   Pulmonary:      Effort: Pulmonary effort is normal. No respiratory distress.      Breath sounds: Normal breath sounds. No wheezing or rales.   Abdominal:      Palpations: Abdomen is soft.      Tenderness: There is no abdominal tenderness. There is no guarding.   Musculoskeletal:      Cervical back: Neck supple. No tenderness.      Right lower leg: No  edema.      Left lower leg: No edema.      Comments: Dark skin discoloration over the dorsal aspect of the left foot. Right foot covered with compression dressing.    Skin:     General: Skin is warm.      Findings: Lesion present.   Neurological:      Mental Status: Mental status is at baseline.   Psychiatric:         Attention and Perception: Attention normal.         Mood and Affect: Mood and affect normal.         Speech: Speech normal.         Behavior: Behavior is cooperative.      Comments: Family at bedside: Pt able to answer questions but not always appropriate. No agitation or anxiety.        Significant Labs:  CBC:   Recent Labs   Lab 04/25/23  0455   WBC 14.73*   RBC 3.05*   HGB 8.2*   HCT 26.2*      MCV 86   MCH 26.9*   MCHC 31.3*     CMP:   Recent Labs   Lab 04/25/23  0455   *   CALCIUM 8.9   ALBUMIN 1.9*   PROT 6.6      K 3.6   CO2 25      BUN 52*   CREATININE 2.6*   ALKPHOS 59   ALT 6*   AST 16   BILITOT 1.0     All labs within the past 24 hours have been reviewed.     Significant Imaging:  All image results in the past 24hrs has bene reviewed

## 2023-04-25 NOTE — PROGRESS NOTES
[facility]  Vascular Surgery  Progress Note    Patient Name: Nelsy Marino  MRN: [id number]  Admission Date: 4/20/2023  Primary Care Provider: Juan F Garay    Subjective:     Interval History: NADN, VSS.  WBC elevated to 14.      Post-Op Info:  * No surgery found *           Medications:  Continuous Infusions:  Scheduled Meds:   amLODIPine  10 mg Oral Daily    aspirin  81 mg Oral Daily    atorvastatin  40 mg Oral QHS    carvediloL  12.5 mg Oral BID    clopidogreL  75 mg Oral Daily    DULoxetine  30 mg Oral BID    heparin (porcine)  5,000 Units Subcutaneous Q8H    magnesium sulfate IVPB  2 g Intravenous Once    micafungin (MYCAMINE) IVPB  100 mg Intravenous Q24H    mupirocin   Nasal BID    pantoprazole  40 mg Oral BID    piperacillin-tazobactam (ZOSYN) IVPB  4.5 g Intravenous Q12H     PRN Meds:sodium chloride, dextrose 10%, dextrose 10%, dextrose, dextrose, glucagon (human recombinant), HYDROmorphone, insulin aspart U-100, naloxone, ondansetron, oxyCODONE, oxyCODONE, sodium chloride 0.9%     Objective:     Vital Signs (Most Recent):  Temp: 97.7 °F (36.5 °C) (04/25/23 0328)  Pulse: 73 (04/25/23 0328)  Resp: 16 (04/25/23 0328)  BP: (!) 156/69 (04/25/23 0328)  SpO2: 96 % (04/25/23 0328)   Vital Signs (24h Range):  Temp:  [97.6 °F (36.4 °C)-99 °F (37.2 °C)] 97.7 °F (36.5 °C)  Pulse:  [67-87] 73  Resp:  [15-19] 16  SpO2:  [95 %-98 %] 96 %  BP: (131-156)/(62-74) 156/69         Physical Exam  Vitals and nursing note reviewed.   HENT:      Nose: Nose normal.   Cardiovascular:      Rate and Rhythm: Normal rate and regular rhythm.    Biphasic doppler signals to left AT and PT pulses  Pulmonary:      Effort: Pulmonary effort is normal. No respiratory distress.   Abdominal:      General: Abdomen is flat. There is no distension.      Palpations: Abdomen is soft.   Musculoskeletal:         General: Normal range of motion.   Skin:     General: Skin is warm.      Capillary Refill: Capillary refill takes less than 2  seconds.      Comments: Right TMA with ACE wrap in place  Left foot with skin discoloration to the forefoot, worst between the great and second toe  Left groin with dressing in place; granulation tissue at wound bed   Neurological:      General: No focal deficit present.      Mental Status: She is alert.   Psychiatric:         Mood and Affect: Mood normal.       Significant Labs:  CBC:   Recent Labs   Lab 04/25/23  0455   WBC 14.73*   RBC 3.05*   HGB 8.2*   HCT 26.2*      MCV 86   MCH 26.9*   MCHC 31.3*     CMP:   Recent Labs   Lab 04/25/23  0455   *   CALCIUM 8.9   ALBUMIN 1.9*   PROT 6.6      K 3.6   CO2 25      BUN 52*   CREATININE 2.6*   ALKPHOS 59   ALT 6*   AST 16   BILITOT 1.0       Significant Diagnostics:  I have reviewed all pertinent imaging results/findings within the past 24 hours.    Assessment/Plan:     PAD (peripheral artery disease)  Nelsy Marino is an 82yoF with known PAD s/p fem-fem and fem-SMA bypass in March of this year. This was complicated by wound dehiscence, exposed graft requiring a right rectus femoris flap. The patient returns to the hospital with complaints of persistent left foot pain, left foot discoloration. Vascular surgery consulted for evaluation.     - patient seen and examined  - labs and imaging reviewed   - arterial US reviewed  - biphasic doppler signals to left AT and PT   - repeat ARCHIE and toe pressures reviewed   - not much more we can optimize from a surgical perspective  - appreciate podiatry and wound care  - recommend plastic surgery consult for right groin rectus femoris flap/wound evaluation  - continue aspirin, statin, plavix  - PT/OT  -paint betadine on wounds every day   -Discussed possible need for further surgical intervention in future with   - please call with questions          Samantha Ledesma NP  Vascular Surgery  [facility]

## 2023-04-25 NOTE — PROGRESS NOTES
[facility]  Podiatry  Progress Note    Patient Name: Nelsy Marino  MRN: [id number]  Admission Date: 4/20/2023  Hospital Length of Stay: 3 days  Attending Physician: Kay Benavidez MD  Primary Care Provider: [facility]     Subjective:     Interval History: Hypertensive, other vitals stable. WBC increased from normal to 15. No new pedal complaints. Dressings clean dry, intact.           Scheduled Meds:   amLODIPine  10 mg Oral Daily    aspirin  81 mg Oral Daily    atorvastatin  40 mg Oral QHS    carvediloL  12.5 mg Oral BID    clopidogreL  75 mg Oral Daily    DULoxetine  30 mg Oral BID    heparin (porcine)  5,000 Units Subcutaneous Q8H    magnesium sulfate IVPB  2 g Intravenous Once    micafungin (MYCAMINE) IVPB  100 mg Intravenous Q24H    mupirocin   Nasal BID    pantoprazole  40 mg Oral BID AC    piperacillin-tazobactam (ZOSYN) IVPB  4.5 g Intravenous Q12H     Continuous Infusions:  PRN Meds:sodium chloride, dextrose 10%, dextrose 10%, dextrose, dextrose, glucagon (human recombinant), HYDROmorphone, insulin aspart U-100, naloxone, ondansetron, oxyCODONE, oxyCODONE, sodium chloride 0.9%    Review of Systems   Constitutional:  Negative for activity change, appetite change and fever.   HENT: Negative.     Eyes: Negative.    Respiratory:  Negative for cough, shortness of breath and wheezing.    Cardiovascular:  Negative for chest pain and leg swelling.   Gastrointestinal:  Negative for abdominal pain, constipation, diarrhea, nausea and vomiting.   Endocrine: Negative.    Genitourinary: Negative.    Musculoskeletal:  Positive for arthralgias and myalgias.   Skin:  Positive for color change and wound. Negative for rash.   Allergic/Immunologic: Negative.    Neurological:  Negative for headaches.   Hematological: Negative.    Psychiatric/Behavioral:  Positive for confusion.    Objective:     Vital Signs (Most Recent):  Temp: 97.7 °F (36.5 °C) (04/25/23 0328)  Pulse: 73 (04/25/23 0328)  Resp:  16 (04/25/23 0328)  BP: (!) 143/75 (04/25/23 0900)  SpO2: 96 % (04/25/23 0328)   Vital Signs (24h Range):  Temp:  [97.6 °F (36.4 °C)-99 °F (37.2 °C)] 97.7 °F (36.5 °C)  Pulse:  [67-87] 73  Resp:  [15-19] 16  SpO2:  [95 %-98 %] 96 %  BP: (131-156)/(62-75) 143/75     Weight: 68 kg (150 lb)  Body mass index is 24.96 kg/m².    Foot Exam    General  Orientation: alert and oriented to person, place, and time   Affect: appropriate       Right Foot/Ankle     Inspection and Palpation  Tenderness: (TMA site, mild)  Swelling: none   Skin Exam: abnormal color and ulcer (TMA incision site); no drainage (resolved) and no maceration (resolved)     Neurovascular  Dorsalis pedis: doppler  Posterior tibial: doppler  Saphenous nerve sensation: diminished  Tibial nerve sensation: diminished  Superficial peroneal nerve sensation: diminished  Deep peroneal nerve sensation: diminished  Sural nerve sensation: diminished    Comments  -Dry stable eschar across TMA site, no drainage, moderately tender.  -Heel with partial thickness skin breakdown.     Left Foot/Ankle      Inspection and Palpation  Tenderness: (rest pain LLE)  Swelling: none   Skin Exam: blister;     Neurovascular  Dorsalis pedis: absent  Posterior tibial: doppler  Saphenous nerve sensation: diminished  Tibial nerve sensation: diminished  Superficial peroneal nerve sensation: diminished  Deep peroneal nerve sensation: diminished  Sural nerve sensation: diminished    Comments  -Dorsal foot blister previously ruptured, no fluctuance or drainage, no open wound. Darkness surrounding blister and extending to bases of toes.                       Laboratory:  Blood Cultures: No results for input(s): LABBLOO in the last 48 hours.  CBC:   Recent Labs   Lab 04/25/23  0455   WBC 14.73*   RBC 3.05*   HGB 8.2*   HCT 26.2*      MCV 86   MCH 26.9*   MCHC 31.3*     CMP:   Recent Labs   Lab 04/25/23  0455   *   CALCIUM 8.9   ALBUMIN 1.9*   PROT 6.6      K 3.6   CO2 25   CL  110   BUN 52*   CREATININE 2.6*   ALKPHOS 59   ALT 6*   AST 16   BILITOT 1.0     CRP: No results for input(s): CRP in the last 168 hours.  ESR: No results for input(s): SEDRATE in the last 168 hours.  Microbiology Results (last 7 days)       Procedure Component Value Units Date/Time    Blood culture x two cultures. Draw prior to antibiotics. [[id number]] Collected: 04/20/23 2057    Order Status: Completed Specimen: Blood from Peripheral, Upper Arm, Right Updated: 04/25/23 0612     Blood Culture, Routine No Growth to date      No Growth to date      No Growth to date      No Growth to date      No Growth to date    Narrative:      Aerobic and anaerobic    Blood culture x two cultures. Draw prior to antibiotics. [[id number]] Collected: 04/20/23 2057    Order Status: Completed Specimen: Blood from Peripheral, Upper Arm, Right Updated: 04/25/23 0612     Blood Culture, Routine No Growth to date      No Growth to date      No Growth to date      No Growth to date      No Growth to date    Narrative:      Aerobic and anaerobic    Urine culture [[id number]] Collected: 04/24/23 1502    Order Status: No result Specimen: Urine Updated: 04/24/23 1810          Specimen (24h ago, onward)      None                Assessment/Plan:     Derm  Eschar of foot  Assessment:   -Right foot s/p TMA with eschar at TMA site, also with partial thickness breakdown of heel. Not clinically infected. X ray unremarkable.  -Left foot with recent blister and ischemic changes. Not clinically infected. X ray unremarkable.  -Severe PAD with B ARCHIE 2.01, prior revascularizations, no further intervention planned by vascular surgery.    Plan:  -No intervention planned by podiatry, OK for discharge from podiatry perspective.  -Appreciate ID recs, feet do not appear infected.  -Appreciate vascular recs, no further revascularization at this time.  -Daily betadine paint to eschars and dark areas of both feet, daily Amlactin to dry scaling areas of both feet.    -Offload bilateral heels in Z flex heel protector boots at all times while in bed or chair.   -BLE WBAT in surgical shoes for transfers or short distances.   -Podiatry will follow.   DC Instructions: Please order ambulatory referral to podiatry (Short). BLE WBAT in surgical shoes. Daily betadine paint to ischemic, dark, necrotic areas of both feet.     Orthopedic  Skin ulcer of right heel, limited to breakdown of skin  See rest of plan    S/P transmetatarsal amputation of foot, right  See rest of plan        Lalo Pollock DPM PGY-3  Podiatric Medicine & Surgery  [facility]  Secure Chat Preferred  Mobile: [phone]  Pager: [phone]

## 2023-04-25 NOTE — ASSESSMENT & PLAN NOTE
Patient with acute kidney injury likely due to IVVD/dehydration LOLY is currently worsening. Labs reviewed- Renal function/electrolytes with Estimated Creatinine Clearance: 15 mL/min (A) (based on SCr of 2.6 mg/dL (H)). according to latest data. Monitor urine output and serial BMP and adjust therapy as needed. Avoid nephrotoxins and renally dose meds for GFR listed above.     Cr 2.1 (baseline 0.8-1.5).  Patient dry on physical exam.  Poor oral intake, most likely prerenal in origin.    Recent Labs   Lab 04/24/23  0548 04/25/23  0455    143   K 3.8 3.6    110   CO2 24 25   BUN 54* 52*   CREATININE 2.5* 2.6*     Improved with fluids initially  - Nephrology consulted; appreciate assistance   - Paniagua placed after patient retaining on bladder scan 4/23 with hematuria 4/24  - RP u/s no hydronephrosis  - Urine lytes showed possible intrinsic etiology  - CMP qd, trend Cr  - strict I/O  - daily weights  - renally dose all medications  - avoid nephrotoxic agents, NSAIDs, IV contrast, ACE/ARB

## 2023-04-25 NOTE — SUBJECTIVE & OBJECTIVE
Subjective:     Interval History: Hypertensive, other vitals stable. WBC increased from normal to 15. No new pedal complaints. Dressings clean dry, intact.           Scheduled Meds:   amLODIPine  10 mg Oral Daily    aspirin  81 mg Oral Daily    atorvastatin  40 mg Oral QHS    carvediloL  12.5 mg Oral BID    clopidogreL  75 mg Oral Daily    DULoxetine  30 mg Oral BID    heparin (porcine)  5,000 Units Subcutaneous Q8H    magnesium sulfate IVPB  2 g Intravenous Once    micafungin (MYCAMINE) IVPB  100 mg Intravenous Q24H    mupirocin   Nasal BID    pantoprazole  40 mg Oral BID AC    piperacillin-tazobactam (ZOSYN) IVPB  4.5 g Intravenous Q12H     Continuous Infusions:  PRN Meds:sodium chloride, dextrose 10%, dextrose 10%, dextrose, dextrose, glucagon (human recombinant), HYDROmorphone, insulin aspart U-100, naloxone, ondansetron, oxyCODONE, oxyCODONE, sodium chloride 0.9%    Review of Systems   Constitutional:  Negative for activity change, appetite change and fever.   HENT: Negative.     Eyes: Negative.    Respiratory:  Negative for cough, shortness of breath and wheezing.    Cardiovascular:  Negative for chest pain and leg swelling.   Gastrointestinal:  Negative for abdominal pain, constipation, diarrhea, nausea and vomiting.   Endocrine: Negative.    Genitourinary: Negative.    Musculoskeletal:  Positive for arthralgias and myalgias.   Skin:  Positive for color change and wound. Negative for rash.   Allergic/Immunologic: Negative.    Neurological:  Negative for headaches.   Hematological: Negative.    Psychiatric/Behavioral:  Positive for confusion.    Objective:     Vital Signs (Most Recent):  Temp: 97.7 °F (36.5 °C) (04/25/23 0328)  Pulse: 73 (04/25/23 0328)  Resp: 16 (04/25/23 0328)  BP: (!) 143/75 (04/25/23 0900)  SpO2: 96 % (04/25/23 0328)   Vital Signs (24h Range):  Temp:  [97.6 °F (36.4 °C)-99 °F (37.2 °C)] 97.7 °F (36.5 °C)  Pulse:  [67-87] 73  Resp:  [15-19] 16  SpO2:  [95 %-98 %] 96 %  BP:  (131-156)/(62-75) 143/75     Weight: 68 kg (150 lb)  Body mass index is 24.96 kg/m².    Foot Exam    General  Orientation: alert and oriented to person, place, and time   Affect: appropriate       Right Foot/Ankle     Inspection and Palpation  Tenderness: (TMA site, mild)  Swelling: none   Skin Exam: abnormal color and ulcer (TMA incision site); no drainage (resolved) and no maceration (resolved)     Neurovascular  Dorsalis pedis: doppler  Posterior tibial: doppler  Saphenous nerve sensation: diminished  Tibial nerve sensation: diminished  Superficial peroneal nerve sensation: diminished  Deep peroneal nerve sensation: diminished  Sural nerve sensation: diminished    Comments  -Dry stable eschar across TMA site, no drainage, moderately tender.  -Heel with partial thickness skin breakdown.     Left Foot/Ankle      Inspection and Palpation  Tenderness: (rest pain LLE)  Swelling: none   Skin Exam: blister;     Neurovascular  Dorsalis pedis: absent  Posterior tibial: doppler  Saphenous nerve sensation: diminished  Tibial nerve sensation: diminished  Superficial peroneal nerve sensation: diminished  Deep peroneal nerve sensation: diminished  Sural nerve sensation: diminished    Comments  -Dorsal foot blister previously ruptured, no fluctuance or drainage, no open wound. Darkness surrounding blister and extending to bases of toes.                       Laboratory:  Blood Cultures: No results for input(s): LABBLOO in the last 48 hours.  CBC:   Recent Labs   Lab 04/25/23  0455   WBC 14.73*   RBC 3.05*   HGB 8.2*   HCT 26.2*      MCV 86   MCH 26.9*   MCHC 31.3*     CMP:   Recent Labs   Lab 04/25/23  0455   *   CALCIUM 8.9   ALBUMIN 1.9*   PROT 6.6      K 3.6   CO2 25      BUN 52*   CREATININE 2.6*   ALKPHOS 59   ALT 6*   AST 16   BILITOT 1.0     CRP: No results for input(s): CRP in the last 168 hours.  ESR: No results for input(s): SEDRATE in the last 168 hours.  Microbiology Results (last 7 days)        Procedure Component Value Units Date/Time    Blood culture x two cultures. Draw prior to antibiotics. [[id number]] Collected: 04/20/23 2057    Order Status: Completed Specimen: Blood from Peripheral, Upper Arm, Right Updated: 04/25/23 0612     Blood Culture, Routine No Growth to date      No Growth to date      No Growth to date      No Growth to date      No Growth to date    Narrative:      Aerobic and anaerobic    Blood culture x two cultures. Draw prior to antibiotics. [[id number]] Collected: 04/20/23 2057    Order Status: Completed Specimen: Blood from Peripheral, Upper Arm, Right Updated: 04/25/23 0612     Blood Culture, Routine No Growth to date      No Growth to date      No Growth to date      No Growth to date      No Growth to date    Narrative:      Aerobic and anaerobic    Urine culture [[id number]] Collected: 04/24/23 1502    Order Status: No result Specimen: Urine Updated: 04/24/23 1810          Specimen (24h ago, onward)      None

## 2023-04-25 NOTE — ANESTHESIA PREPROCEDURE EVALUATION
[facility]  Anesthesia Pre-Operative Evaluation         Patient Name: Nelsy Marino  YOB: 1940  MRN: [id number]    SUBJECTIVE:     Pre-operative evaluation for Procedure(s) (LRB):  EGD (ESOPHAGOGASTRODUODENOSCOPY) (N/A)  Scheduled for 4/26/2023    HPI 04/25/2023:  Nelsy Marino is a 82 y.o. female with hx of HTN, T2DM, diastolic HF, and peripheral vascular disease s/p L fem bypass and R toe amputations 2/2023 admitted with nectrotic left foot. After her bypass, her post-op course was c/b wound dehiscence requiring washout and flap graft. She was started on eliquis but did not tolerate due to GIB. No further surgical interventions planned. She has required 1u pRBC with ongoing melena and dropping hgb. Presents now for above procedure.      Prev airway:   Induction:  Intravenous    Intubated:  Postinduction    Mask Ventilation:  Easy with oral airway    Attempts:  1    Attempted By:  Staff anesthesiologist    Method of Intubation:  Direct    Blade:  Mendez 2    Laryngeal View Grade: Grade I - full view of cords      Difficult Airway Encountered?: No      Complications:  None    Airway Device:  Oral endotracheal tube    Airway Device Size:  7.0    Style/Cuff Inflation:  Cuffed (inflated to minimal occlusive pressure)    Secured at:  The lips    Placement Verified By:  Capnometry    Complicating Factors:  None    Findings Post-Intubation:  BS equal bilateral    Oxygen/Ventilation Requirements:  On room air            Patient Active Problem List   Diagnosis    Lichen sclerosus et atrophicus    PAD (peripheral artery disease)    Essential hypertension    Type 2 diabetes mellitus with chronic kidney disease, with long-term current use of insulin    Diastolic CHF, chronic    Obesity (BMI 30-39.9)    Critical right lower limb ischemia    Acute hypoxemic respiratory failure    History of DVT (deep vein thrombosis)    Wound dehiscence    Fluid collection at surgical site     Cellulitis    Postoperative seroma of musculoskeletal structure after non-musculoskeletal procedure    Abscess of right groin    Critical limb ischemia with history of revascularization of same extremity    Sepsis    Hyponatremia    Vascular graft infection    S/P transmetatarsal amputation of foot, right    Leukocytosis    Acute encephalopathy    Bilious vomiting    Upper GI bleed    Diabetic foot infection    Melena    Eschar of foot    Acute on chronic anemia    LOLY (acute kidney injury)    Skin ulcer of right heel, limited to breakdown of skin    Debility    Vascular inflammation or infection    Wound infection    Somnolence       Review of patient's allergies indicates:   Allergen Reactions    Motrin [ibuprofen] Itching       Outpatient Medications:  No current facility-administered medications on file prior to encounter.     Current Outpatient Medications on File Prior to Encounter   Medication Sig Dispense Refill    amlodipine (NORVASC) 10 MG tablet Take 10 mg by mouth once daily.      aspirin (ECOTRIN) 81 MG EC tablet Take 81 mg by mouth once daily.      atorvastatin (LIPITOR) 40 MG tablet Take 1 tablet (40 mg total) by mouth every evening. 30 tablet 0    BLOOD SUGAR DIAGNOSTIC (TRUE METRIX GLUCOSE TEST STRIP MISC) by Misc.(Non-Drug; Combo Route) route.      carvediloL (COREG) 12.5 MG tablet Take 12.5 mg by mouth 2 (two) times daily.      cephALEXin (KEFLEX) 500 MG capsule Take 2 capsules (1,000 mg total) by mouth every 12 (twelve) hours. 60 capsule 0    clopidogreL (PLAVIX) 75 mg tablet Take 1 tablet (75 mg total) by mouth once daily. 30 tablet 0    DULoxetine (CYMBALTA) 30 MG capsule Take 1 capsule (30 mg total) by mouth 2 (two) times daily. 60 capsule 0    furosemide (LASIX) 20 MG tablet Take 1 tablet (20 mg total) by mouth once daily. 30 tablet 0    insulin aspart protamine-insulin aspart (NOVOLOG MIX 70-30FLEXPEN U-100) 100 unit/mL (70-30) InPn pen Inject 12 Units into  "the skin 2 (two) times daily before meals.  0    insulin syringe-needle,dispos. 0.3 mL 30 gauge x 5/16" Syrg by Misc.(Non-Drug; Combo Route) route.      losartan-hydrochlorothiazide 100-25 mg (HYZAAR) 100-25 mg per tablet Take 1 tablet by mouth once daily.      metformin (GLUCOPHAGE) 1000 MG tablet Take 1,000 mg by mouth 2 (two) times daily with meals.      ondansetron (ZOFRAN) 8 MG tablet   0    oxyCODONE (ROXICODONE) 5 MG immediate release tablet Take 1 tablet (5 mg total) by mouth every 6 (six) hours as needed for Pain. 12 tablet 0    pantoprazole (PROTONIX) 40 MG tablet Take 1 tablet (40 mg total) by mouth 2 (two) times daily. 60 tablet 1    pen needle, diabetic 32 gauge x 5/32" Ndle Inject 1 each into the skin 4 (four) times daily. 100 each 0        Current Inpatient Medications:   amLODIPine  10 mg Oral Daily    ammonium lactate   Topical (Top) Daily    atorvastatin  40 mg Oral QHS    carvediloL  12.5 mg Oral BID    clopidogreL  75 mg Oral Daily    DULoxetine  30 mg Oral BID    micafungin (MYCAMINE) IVPB  100 mg Intravenous Q24H    mupirocin   Nasal BID    pantoprazole  40 mg Oral BID AC    piperacillin-tazobactam (ZOSYN) IVPB  4.5 g Intravenous Q12H       Past Surgical History:   Procedure Laterality Date     femoral vascular surgery       ANGIOGRAPHY OF LOWER EXTREMITY Right 2/23/2023    Procedure: Angiogram Extremity Unilateral;  Surgeon: Danny Dunbar MD;  Location: [facility] OR;  Service: Vascular;  Laterality: Right;  RN PHONE PREOP 2/20/2023----CK CONSENT    ANGIOGRAPHY OF LOWER EXTREMITY Right 4/4/2023    Procedure: Angiogram Extremity Unilateral - possible endovascular intervention;  Surgeon: René Simms MD;  Location: [facility] OR [address] [place] [address];  Service: Vascular;  Laterality: Right;  19.6 mins.  740.81 mGy  73.3399 Gy.cm  58ml dye  local - 1ml    CREATION OF FEMORAL-FEMORAL ARTERY BYPASS WITH GRAFT Bilateral 3/2/2023    Procedure: CREATION, BYPASS, ARTERIAL, FEMORAL TO FEMORAL, " USING GRAFT, LEFT FEMORAL ENDARTERECTOMY, LEFT ILIAC ARTERY STENT PLACEMENT, RIGHT LOWER EXTREMITY ANGIOGRAM AND RIGHT SFA  ANGIOPLASTY;  Surgeon: Danny Dunbar MD;  [facility];  Service: Vascular;  Laterality: Bilateral;   744.47 MGY  175.36 Gycm  Flouro time 20.5 mins      CREATION OF ILIOFEMORAL ARTERY BYPASS Right 3/20/2019    Procedure: CREATION, BYPASS, ARTERIAL, ILIAC TO FEMORAL, RIGHT LOWER EXTREMITY, RIGHT PROFUNDAPLASTY;  Surgeon: Danny Dunbar MD;  Location: [facility] OR;  Service: Vascular;  Laterality: Right;  11:00AM START PER NAZANIN RICHARDS PREOP 3/13/2019  ACT'S, CELL SAVER, GRAFTS, BOOK WATER---NEED H/P  CONSENT IN AM------HGBA1C    CREATION OF MUSCLE ROTATIONAL FLAP Left 3/31/2023    Procedure: CREATION, FLAP, MUSCLE ROTATION;  Surgeon: Brandon Smiley MD;  Location: [facility] OR [address] [place] [address];  Service: Plastics;  Laterality: Left;    ESOPHAGOGASTRODUODENOSCOPY N/A 3/17/2023    Procedure: EGD (ESOPHAGOGASTRODUODENOSCOPY);  Surgeon: Gee Rodriguez MD;  [facility]  Service: Endoscopy;  Laterality: N/A;    ESOPHAGOGASTRODUODENOSCOPY N/A 4/11/2023    Procedure: EGD (ESOPHAGOGASTRODUODENOSCOPY);  Surgeon: Nazanin Kaiser MD;  [facility]);  Service: Endoscopy;  Laterality: N/A;    FOOT AMPUTATION THROUGH METATARSAL Right 3/4/2023    Procedure: AMPUTATION, FOOT, TRANSMETATARSAL;  Surgeon: Benjamin Godfrey DPM;  Location: [facility] OR [address] [place] [address];  Service: Podiatry;  Laterality: Right;    HYSTERECTOMY      ?unsure cervix present    INCISION AND DRAINAGE Right 4/12/2019    Procedure: Incision and Drainage - R groin washout, possible muscle flap;  Surgeon: Danny Dunbar MD;  Location: [facility] OR [address] [place] [address];  Service: Vascular;  Laterality: Right;  groin washout    INCISION AND DRAINAGE OF GROIN Right 5/3/2019    Procedure: Incision and Drainage R groin;  Surgeon: Danny Dunbar MD;  Location: [facility] OR [address] [place] [address];  Service: Vascular;   Laterality: Right;    WOUND DEBRIDEMENT Left 3/31/2023    Procedure: DEBRIDEMENT, WOUND;  Surgeon: René Simms MD;  Location: [facility] OR [address] [place] [address];  Service: Vascular;  Laterality: Left;       Social History     Socioeconomic History    Marital status:    Tobacco Use    Smoking status: Former     Types: Cigarettes     Quit date:      Years since quittin.3    Smokeless tobacco: Never   Substance and Sexual Activity    Alcohol use: No    Drug use: No    Sexual activity: Never     Social Determinants of Health     Financial Resource Strain: Unknown    Difficulty of Paying Living Expenses: Patient refused   Food Insecurity: No Food Insecurity    Worried About Running Out of Food in the Last Year: Never true    Ran Out of Food in the Last Year: Never true   Transportation Needs: No Transportation Needs    Lack of Transportation (Medical): No    Lack of Transportation (Non-Medical): No   Physical Activity: Inactive    Days of Exercise per Week: 0 days    Minutes of Exercise per Session: 0 min   Stress: Unknown    Feeling of Stress : Patient refused   Social Connections: Unknown    Frequency of Communication with Friends and Family: More than three times a week    Frequency of Social Gatherings with Friends and Family: More than three times a week    Attends Christianity Services: Patient refused    Active Member of Clubs or Organizations: Patient refused    Attends Club or Organization Meetings: Patient refused    Marital Status:    Housing Stability: Low Risk     Unable to Pay for Housing in the Last Year: No    Number of Places Lived in the Last Year: 1    Unstable Housing in the Last Year: No       OBJECTIVE:     Weight:  Wt Readings from Last 1 Encounters:   23 68 kg (150 lb)     Body mass index is 24.96 kg/m².    Recent Blood Pressure Readings:  BP Readings from Last 3 Encounters:   23 (!) 143/75   23 (!) 93/43   23 (!) 150/66       Vital Signs Range  (Last 24H):  Temp:  [36.4 °C (97.6 °F)-37.2 °C (99 °F)]   Pulse:  [67-87]   Resp:  [15-19]   BP: (131-156)/(62-75)   SpO2:  [95 %-98 %]       CBC:   Lab Results   Component Value Date    WBC 14.73 (H) 04/25/2023    HGB 8.2 (L) 04/25/2023    HCT 26.2 (L) 04/25/2023    MCV 86 04/25/2023     04/25/2023       CMP:     Chemistry        Component Value Date/Time     04/25/2023 0455    K 3.6 04/25/2023 0455     04/25/2023 0455    CO2 25 04/25/2023 0455    BUN 52 (H) 04/25/2023 0455    CREATININE 2.6 (H) 04/25/2023 0455     (H) 04/25/2023 0455        Component Value Date/Time    CALCIUM 8.9 04/25/2023 0455    ALKPHOS 59 04/25/2023 0455    AST 16 04/25/2023 0455    ALT 6 (L) 04/25/2023 0455    BILITOT 1.0 04/25/2023 0455    ESTGFRAFRICA 55 (A) 10/18/2019 0945    EGFRNONAA 48 (A) 10/18/2019 0945            INR:  Lab Results   Component Value Date    INR 1.2 04/04/2023    INR 1.2 03/29/2023    INR 1.4 (H) 03/18/2023       Diagnostic Studies:      EKG:    Results for orders placed or performed during the hospital encounter of 04/20/23   EKG 12-lead    Collection Time: 04/20/23  9:27 PM    Narrative    Test Reason : R40.0,    Vent. Rate : 082 BPM     Atrial Rate : 082 BPM     P-R Int : 174 ms          QRS Dur : 086 ms      QT Int : 392 ms       P-R-T Axes : 045 037 -03 degrees     QTc Int : 457 ms    Normal sinus rhythm  Normal ECG  When compared with ECG of 29-MAR-2023 20:18,  No significant change was found  Confirmed by Chika St MD ([id number]) on 4/21/2023 1:29:33 PM    Referred By: [email or web address]   SELF           Confirmed By:Chika St MD       2D Echo:    No results found for this or any previous visit.    Results for orders placed or performed during the hospital encounter of 02/27/23   Echo   Result Value Ref Range    AV mean gradient 6 mmHg    Ao peak maira 1.61 m/s    Ao VTI 36.93 cm    IVS 1.00 0.6 - 1.1 cm    LA size 3.60 cm    Left Atrium Major Axis 5.11 cm    Left Atrium Minor Axis 4.65 cm     LVIDd 4.42 3.5 - 6.0 cm    LVIDs 3.00 2.1 - 4.0 cm    LVOT diameter 1.98 cm    LVOT peak VTI 30.13 cm    Posterior Wall 0.70 0.6 - 1.1 cm    MV Peak A Angel 1.34 m/s    E wave deceleration time 215.29 msec    MV Peak E Angel 1.40 m/s    RA Major Axis 4.59 cm    RA Width 3.60 cm    RVDD 3.12 cm    Sinus 3.04 cm    TAPSE 1.87 cm    TR Max Angel 3.41 m/s    TDI LATERAL 0.04 m/s    TDI SEPTAL 0.04 m/s    LA WIDTH 3.44 cm    MV stenosis pressure 1/2 time 62.43 ms    LV Diastolic Volume 88.82 mL    LV Systolic Volume 34.87 mL    RV S' 10.87 cm/s    LVOT peak angel 1.20 m/s    LA volume (mod) 73.87 cm3    LV LATERAL E/E' RATIO 35.00 m/s    LV SEPTAL E/E' RATIO 35.00 m/s    FS 32 %    LA volume 51.25 cm3    LV mass 119.47 g    Left Ventricle Relative Wall Thickness 0.32 cm    AV valve area 2.51 cm2    AV Velocity Ratio 0.75     AV index (prosthetic) 0.82     MV valve area p 1/2 method 3.52 cm2    E/A ratio 1.04     Mean e' 0.04 m/s    LVOT area 3.1 cm2    LVOT stroke volume 92.73 cm3    AV peak gradient 10 mmHg    E/E' ratio 35.00 m/s    LV Systolic Volume Index 19.1 mL/m2    LV Diastolic Volume Index 48.54 mL/m2    LA Volume Index 28.0 mL/m2    LV Mass Index 65 g/m2    Triscuspid Valve Regurgitation Peak Gradient 47 mmHg    LA Volume Index (Mod) 40.4 mL/m2    BSA 1.87 m2    Right Atrial Pressure (from IVC) 8 mmHg    EF 65 %    TV rest pulmonary artery pressure 55 mmHg    Narrative    · The estimated ejection fraction is 65%.  · The left ventricle is normal in size with normal systolic function.  · Grade II left ventricular diastolic dysfunction.  · Normal right ventricular size with normal right ventricular systolic   function.  · Mild left atrial enlargement.  · Mild-to-moderate mitral regurgitation.  · There is pulmonary hypertension.  · The estimated PA systolic pressure is 55 mmHg.  · Intermediate central venous pressure (8 mmHg).          No results found for this or any previous visit.      ASSESSMENT/PLAN:           Pre-op  Assessment    I have reviewed the Patient Summary Reports.    I have reviewed the NPO Status.      Review of Systems  Anesthesia Hx:  No problems with previous Anesthesia  Denies Family Hx of Anesthesia complications.   Denies Personal Hx of Anesthesia complications.   Social:  Former Smoker    Hematology/Oncology:         -- Anemia:   Cardiovascular:   Hypertension CHF PVD    Pulmonary:   Denies COPD.  Denies Asthma.    Hepatic/GI:   Denies PUD. Denies Hiatal Hernia.  Denies GERD.    Neurological:   Denies CVA.    Endocrine:   Diabetes  Denies Obesity / BMI > 30      Physical Exam  General: Well nourished, Cooperative, Alert and Oriented    Airway:  Mallampati: III   Mouth Opening: Normal  TM Distance: Normal  Tongue: Normal  Neck ROM: Normal ROM    Dental:  Dentures        Anesthesia Plan  Type of Anesthesia, risks & benefits discussed:    Anesthesia Type: Gen Natural Airway  Intra-op Monitoring Plan: Standard ASA Monitors  Post Op Pain Control Plan: multimodal analgesia  Induction:  IV  Informed Consent: Informed consent signed with the Patient and all parties understand the risks and agree with anesthesia plan.  All questions answered.   ASA Score: 4  Day of Surgery Review of History & Physical: H&P Update referred to the surgeon/provider.    Ready For Surgery From Anesthesia Perspective.     .

## 2023-04-25 NOTE — ASSESSMENT & PLAN NOTE
Ms Marino is a 83yo lady being treated for PAD associated with acute left foot pain and left foot skin changes. 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 Pt also presented with elevated creatinine, concerning for LOLY in setting of acute infection. GFR with Creatinine Clearance at 23% and 2.1mg/dL on 4/20/23 at 2059. 2L of IV LR infused from 0921on 4/21/23 to 0920 on 4/22/23. GFR and Creatinine Clearance increased as expected to 32% and 1.6mg/dL on 4/21/23 at 0400. [place] in place to r/o retention complications. No reported dysuria prior to admission and negative for UTI on admission. Retroperitoneal US was negative for hydronephrosis but elevated bilateral resistive indices concerning for medical renal disease. Progressive encephalopathy noted, which may be neurotoxicity from cefepime therapy held on 4/22/23. Switched to Zosyn and Micafungin for continued infectious coverage. ID consulted for continued antibiotic stewardship. GFR with Creatinine Clearance now at 18.7% and Cr of 2.5mg/dL on 4/24/23 despite fluid resuscitation. Urine analysis with reflex significant for >100 WBCs with moderate bacteria, cultures pending. Labs elevated for urine protein/creatinine (1.18), urine protein (105), serum protein (5.9) serum kappa/lambda light chains (13.76/8.39). Indirect Josselyn was positive. Podiatry consulted for foot wounds with no new findings of infection.      Recommendations:  --Follow up results for urine culture/sensitivity  --Nephrology will spin urine for further investigation of proteinuria  --Avoid nephrotoxic agents  --Monitor I/Os for fluid status

## 2023-04-25 NOTE — SUBJECTIVE & OBJECTIVE
Medications:  Continuous Infusions:  Scheduled Meds:   amLODIPine  10 mg Oral Daily    aspirin  81 mg Oral Daily    atorvastatin  40 mg Oral QHS    carvediloL  12.5 mg Oral BID    clopidogreL  75 mg Oral Daily    DULoxetine  30 mg Oral BID    heparin (porcine)  5,000 Units Subcutaneous Q8H    magnesium sulfate IVPB  2 g Intravenous Once    micafungin (MYCAMINE) IVPB  100 mg Intravenous Q24H    mupirocin   Nasal BID    pantoprazole  40 mg Oral BID    piperacillin-tazobactam (ZOSYN) IVPB  4.5 g Intravenous Q12H     PRN Meds:sodium chloride, dextrose 10%, dextrose 10%, dextrose, dextrose, glucagon (human recombinant), HYDROmorphone, insulin aspart U-100, naloxone, ondansetron, oxyCODONE, oxyCODONE, sodium chloride 0.9%     Objective:     Vital Signs (Most Recent):  Temp: 97.7 °F (36.5 °C) (04/25/23 0328)  Pulse: 73 (04/25/23 0328)  Resp: 16 (04/25/23 0328)  BP: (!) 156/69 (04/25/23 0328)  SpO2: 96 % (04/25/23 0328)   Vital Signs (24h Range):  Temp:  [97.6 °F (36.4 °C)-99 °F (37.2 °C)] 97.7 °F (36.5 °C)  Pulse:  [67-87] 73  Resp:  [15-19] 16  SpO2:  [95 %-98 %] 96 %  BP: (131-156)/(62-74) 156/69         Physical Exam  Vitals and nursing note reviewed.   HENT:      Nose: Nose normal.   Cardiovascular:      Rate and Rhythm: Normal rate and regular rhythm.   Pulmonary:      Effort: Pulmonary effort is normal. No respiratory distress.   Abdominal:      General: Abdomen is flat. There is no distension.      Palpations: Abdomen is soft.   Musculoskeletal:         General: Normal range of motion.   Skin:     General: Skin is warm.      Capillary Refill: Capillary refill takes less than 2 seconds.      Comments: Right TMA with ACE wrap in place  Left foot with skin discoloration to the forefoot, worst between the great and second toe  Left groin with dressing in place; granulation tissue at wound bed   Neurological:      General: No focal deficit present.      Mental Status: She is alert.   Psychiatric:         Mood and  Affect: Mood normal.       Significant Labs:  CBC:   Recent Labs   Lab 04/25/23  0455   WBC 14.73*   RBC 3.05*   HGB 8.2*   HCT 26.2*      MCV 86   MCH 26.9*   MCHC 31.3*     CMP:   Recent Labs   Lab 04/25/23  0455   *   CALCIUM 8.9   ALBUMIN 1.9*   PROT 6.6      K 3.6   CO2 25      BUN 52*   CREATININE 2.6*   ALKPHOS 59   ALT 6*   AST 16   BILITOT 1.0       Significant Diagnostics:  I have reviewed all pertinent imaging results/findings within the past 24 hours.

## 2023-04-25 NOTE — ASSESSMENT & PLAN NOTE
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  Patient continues to have melena and requiring blood transfusion    - GI consulted, plan for push enteroscopy tomorrow. NPO at [facility]  - Continue PPI BID  - Maintain 2 large bore Ivs for volume resuscitation  - Trend CBC BID. Transfuse pRBC for Hb < 7 g/dL  - Stopped Heparin for DVT ppx and ASA. Will continue with only plavix for now.

## 2023-04-25 NOTE — ASSESSMENT & PLAN NOTE
Baseline 8.6-11.   Initially suspected foot wounds, patient also has leg muscle flap with drain vs hematuria vs melena    - CBC qd, trend  - transfuse pRBC for Hb < 7 g/dL; transfuse for < 8 if ACS or active bleeding and unresponsive to volume resuscitation with evidence of end-organ ischemia/>20% blood volume loss with HD instablity/>30% blood volume loss

## 2023-04-25 NOTE — ASSESSMENT & PLAN NOTE
A&O x3 on admission with waxing/waning per  at the bedside.  says she has been largely bed bound since discharge, but her waxing confusion was concerning to him. Patients mentation significantly improved after stopping cefepime.    - Repeat CT Head with no acute change  - EEG without seizures  - TSH, B12, B1, Vit E, Ammonia, RPR were unremarkable  - Cefepime changed to Zosyn  - F/u blood and Urine cultures

## 2023-04-25 NOTE — ASSESSMENT & PLAN NOTE
Due to wound dehiscence during prior admission.  · Antimicrobial therapy as above.  · Follow up with Plastic Surgery as outpatient per their last recommendations of 4/9/23

## 2023-04-25 NOTE — PROGRESS NOTES
[facility]  Nephrology  Progress Note    Patient Name: Nelsy Marino  MRN: [id number]  Admission Date: 4/20/2023  Hospital Length of Stay: 3 days  Attending Provider: Kay Benavidez MD   Primary Care Physician: Juan F Garay  Principal Problem:Vascular inflammation or infection    Subjective:     HPI: Ms Marino is an 83yo lady with HTN, DMII, DVT, CHF, PAD, DVT, left to right fem to SFA bypass and right SFA angioplasty with stent placement on 03/2/23, complicated by wound dehiscence of the left groin with graft exposure, and on outpatient cefepime plus micafungin (EOC5/16/23) who was admitted with left foot pain and skin changes concerning for necrosis. This was associated with progressive confusion. She was admitted to  service where she was continued on cefepime and micafungin, later changed to zosyn and micafungin to rule out cefepime toxicity. Confusion has worsened in the hospital since admission 2 days ago with ongoing pain of her feet. Vascular surgery consulted, recommending repeat ARCHIE and toe pressures. Head CT on 4/22/23 negative for acute findings. EEG ordered. Patient transitioned to Zosyn and micafungin. LOLY noted at admission, improved initially with fluids but worsened again on 4/23/23, likely from poor oral intake. Fluids reinitiated and rivas placed, no additional increase in Cr noted and Nephrology was consulted for assessment and management of LOLY on CKD.        Interval History:   No acute events overnight. One unit pRBCs transfused on 4/24/23; Hgb increased from 6.6 to 8.2. Pt remained afebrile and hemodyanically stable following the transfusion. Urine output in past 24hrs at 1100ml.    Review of patient's allergies indicates:   Allergen Reactions    Motrin [ibuprofen] Itching     Current Facility-Administered Medications   Medication Frequency    0.9%  NaCl infusion (for blood administration) Q24H PRN    amLODIPine tablet 10 mg Daily    ammonium lactate 12 % lotion Daily     atorvastatin tablet 40 mg QHS    carvediloL tablet 12.5 mg BID    clopidogreL tablet 75 mg Daily    dextrose 10% bolus 125 mL 125 mL PRN    dextrose 10% bolus 250 mL 250 mL PRN    dextrose 40 % gel 15,000 mg PRN    dextrose 40 % gel 30,000 mg PRN    DULoxetine DR capsule 30 mg BID    glucagon (human recombinant) injection 1 mg PRN    HYDROmorphone injection 0.5 mg Q6H PRN    insulin aspart U-100 pen 0-5 Units QID (AC + HS) PRN    micafungin 100 mg in sodium chloride 0.9 % 100 mL IVPB (MB+) Q24H    mupirocin 2 % ointment BID    naloxone 0.4 mg/mL injection 0.02 mg PRN    ondansetron injection 4 mg Q6H PRN    oxyCODONE immediate release tablet 5 mg Q4H PRN    oxyCODONE immediate release tablet Tab 10 mg Q4H PRN    pantoprazole EC tablet 40 mg BID AC    piperacillin-tazobactam (ZOSYN) 4.5 g in dextrose 5 % in water (D5W) 5 % 100 mL IVPB (MB+) Q12H    sodium chloride 0.9% flush 10 mL Q12H PRN       Objective:     Review of Systems   Constitutional:  Positive for malaise/fatigue. Negative for chills and fever.   HENT:  Negative for congestion, hearing loss, sinus pain and sore throat.    Eyes:  Negative for blurred vision, photophobia and redness.   Respiratory:  Negative for cough, shortness of breath and wheezing.    Cardiovascular:  Negative for chest pain, palpitations and leg swelling.   Gastrointestinal:  Negative for blood in stool, constipation, diarrhea, nausea and vomiting.   Genitourinary:  Negative for dysuria and flank pain.   Musculoskeletal:  Positive for myalgias. Negative for falls and joint pain.   Skin:  Negative for itching and rash.   Neurological:  Negative for dizziness, tingling, weakness and headaches.   Psychiatric/Behavioral:  Negative for depression. The patient is not nervous/anxious.       Vital Signs (Most Recent):  Temp: 97.6 °F (36.4 °C) (04/25/23 1109)  Pulse: 82 (04/25/23 1153)  Resp: 18 (04/25/23 1052)  BP: (Abnormal) 143/75 (04/25/23 0900)  SpO2: 96 % (04/25/23  0328)   Vital Signs (24h Range):  Temp:  [97.6 °F (36.4 °C)-99 °F (37.2 °C)] 97.6 °F (36.4 °C)  Pulse:  [67-87] 82  Resp:  [15-19] 18  SpO2:  [95 %-98 %] 96 %  BP: (131-156)/(62-75) 143/75     Weight: 68 kg (150 lb) (04/20/23 2035)  Body mass index is 24.96 kg/m².  Body surface area is 1.77 meters squared.    I/O last 3 completed shifts:  In: 219 [Blood:219]  Out: 426.5 [Urine:425; Drains:1.5]    Physical Exam  Vitals and nursing note reviewed.   Constitutional:       General: She is not in acute distress.     Appearance: She is not ill-appearing.   HENT:      Head: Normocephalic and atraumatic.      Right Ear: External ear normal.      Left Ear: External ear normal.      Nose: No congestion.      Mouth/Throat:      Pharynx: Oropharynx is clear.   Eyes:      General:         Right eye: No discharge.         Left eye: No discharge.      Extraocular Movements: Extraocular movements intact.      Conjunctiva/sclera: Conjunctivae normal.   Cardiovascular:      Rate and Rhythm: Normal rate and regular rhythm.      Pulses: Normal pulses.      Heart sounds: Normal heart sounds.   Pulmonary:      Effort: Pulmonary effort is normal. No respiratory distress.      Breath sounds: Normal breath sounds. No wheezing or rales.   Abdominal:      Palpations: Abdomen is soft.      Tenderness: There is no abdominal tenderness. There is no guarding.   Musculoskeletal:      Cervical back: Neck supple. No tenderness.      Right lower leg: No edema.      Left lower leg: No edema.      Comments: Dark skin discoloration over the dorsal aspect of the left foot. Right foot covered with compression dressing.    Skin:     General: Skin is warm.      Findings: Lesion present.   Neurological:      Mental Status: Mental status is at baseline.   Psychiatric:         Attention and Perception: Attention normal.         Mood and Affect: Mood and affect normal.         Speech: Speech normal.         Behavior: Behavior is cooperative.      Comments:  Family at bedside: Pt able to answer questions but not always appropriate. No agitation or anxiety.        Significant Labs:  CBC:   Recent Labs   Lab 04/25/23  0455   WBC 14.73*   RBC 3.05*   HGB 8.2*   HCT 26.2*      MCV 86   MCH 26.9*   MCHC 31.3*     CMP:   Recent Labs   Lab 04/25/23  0455   *   CALCIUM 8.9   ALBUMIN 1.9*   PROT 6.6      K 3.6   CO2 25      BUN 52*   CREATININE 2.6*   ALKPHOS 59   ALT 6*   AST 16   BILITOT 1.0     All labs within the past 24 hours have been reviewed.     Significant Imaging:  All image results in the past 24hrs has bene reviewed    Assessment/Plan:     Renal/  LOLY (acute kidney injury)  Ms Marino is a 81yo lady being treated for PAD associated with acute left foot pain and left foot skin changes. 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 Pt also presented with elevated creatinine, concerning for LOLY in setting of acute infection. GFR with Creatinine Clearance at 23% and 2.1mg/dL on 4/20/23 at 2059. 2L of IV LR infused from 0921on 4/21/23 to 0920 on 4/22/23. GFR and Creatinine Clearance increased as expected to 32% and 1.6mg/dL on 4/21/23 at 0400. Paniagua in place to r/o retention complications. No reported dysuria prior to admission and negative for UTI on admission. Retroperitoneal US was negative for hydronephrosis but elevated bilateral resistive indices concerning for medical renal disease. Progressive encephalopathy noted, which may be neurotoxicity from cefepime therapy held on 4/22/23. Switched to Zosyn and Micafungin for continued infectious coverage. ID consulted for continued antibiotic stewardship. GFR with Creatinine Clearance now at 18.7% and Cr of 2.5mg/dL on 4/24/23 despite fluid resuscitation. Urine analysis with reflex significant for >100 WBCs with moderate bacteria, cultures pending. Labs elevated for urine protein/creatinine (1.18), urine protein (105), serum protein (5.9) serum  kappa/lambda light chains (13.76/8.39). Indirect Josselyn was positive. Podiatry consulted for foot wounds with no new findings of infection.      Recommendations:  --Follow up results for urine culture/sensitivity  --Nephrology will spin urine for further investigation of proteinuria  --Avoid nephrotoxic agents  --Monitor I/Os for fluid status        Thank you for your consult. I will follow-up with patient. Please contact us if you have any additional questions.    Flip Priest MD  Nephrology  [facility]

## 2023-04-25 NOTE — NURSING
Pt is AAOx4, VSS, on RA. Has c/o pain, medicated per MAR. No n/v/d.     Paniagua is in place, urine is a pink/red color. Urine sample set off.     Sister is at BS. No significant changes throughout shift.

## 2023-04-25 NOTE — ASSESSMENT & PLAN NOTE
Nelsy Marino is an 82yoF with known PAD s/p fem-fem and fem-SMA bypass in March of this year. This was complicated by wound dehiscence, exposed graft requiring a right rectus femoris flap. The patient returns to the hospital with complaints of persistent left foot pain, left foot discoloration. Vascular surgery consulted for evaluation.     - patient seen and examined  - labs and imaging reviewed   - arterial US reviewed  - biphasic doppler signals to left AT and PT   - repeat ARCHIE and toe pressures reviewed   - not much more we can optimize from a surgical perspective  - appreciate podiatry and wound care  - recommend plastic surgery consult for right groin rectus femoris flap/wound evaluation  - continue aspirin, statin, plavix  - PT/OT  -paint betadine on wounds every day   - please call with questions

## 2023-04-25 NOTE — SUBJECTIVE & OBJECTIVE
Interval History: This morning patient continued to have melena. Hemodynamically stable. She received 1 unit of blood overnight. Reports continued pain of foot.     Review of Systems   Constitutional:  Negative for activity change, appetite change and fever.   HENT:  Negative for congestion and sore throat.    Respiratory:  Negative for cough, shortness of breath and wheezing.    Cardiovascular:  Negative for chest pain and leg swelling.   Gastrointestinal:  Negative for abdominal pain, constipation, diarrhea, nausea and vomiting.   Genitourinary:  Negative for difficulty urinating and dysuria.   Musculoskeletal:  Positive for arthralgias and myalgias.   Skin:  Positive for color change and wound. Negative for rash.   Allergic/Immunologic: Negative.    Neurological:  Negative for dizziness and headaches.   Hematological: Negative.    Objective:     Vital Signs (Most Recent):  Temp: 97.6 °F (36.4 °C) (04/25/23 1109)  Pulse: 82 (04/25/23 1153)  Resp: 18 (04/25/23 1052)  BP: (!) 143/75 (04/25/23 0900)  SpO2: 96 % (04/25/23 0328)   Vital Signs (24h Range):  Temp:  [97.6 °F (36.4 °C)-99 °F (37.2 °C)] 97.6 °F (36.4 °C)  Pulse:  [67-87] 82  Resp:  [15-19] 18  SpO2:  [95 %-98 %] 96 %  BP: (131-156)/(62-75) 143/75     Weight: 68 kg (150 lb)  Body mass index is 24.96 kg/m².    Intake/Output Summary (Last 24 hours) at 4/25/2023 1354  Last data filed at 4/25/2023 1129  Gross per 24 hour   Intake 219 ml   Output 950 ml   Net -731 ml      Physical Exam  Vitals and nursing note reviewed.   Constitutional:       General: She is not in acute distress.     Appearance: She is not ill-appearing.   HENT:      Head: Normocephalic and atraumatic.      Mouth/Throat:      Mouth: Mucous membranes are moist.      Pharynx: Oropharynx is clear.   Eyes:      Extraocular Movements: Extraocular movements intact.      Conjunctiva/sclera: Conjunctivae normal.   Cardiovascular:      Rate and Rhythm: Regular rhythm. Tachycardia present.      Pulses:  Normal pulses.   Pulmonary:      Effort: Pulmonary effort is normal. No respiratory distress.      Breath sounds: No wheezing or rales.   Abdominal:      Palpations: Abdomen is soft.      Tenderness: There is no abdominal tenderness. There is no guarding.   Genitourinary:     Comments: Black stool on diaper  Musculoskeletal:      Cervical back: Normal range of motion and neck supple.      Right lower leg: No edema.      Left lower leg: No edema.   Skin:     General: Skin is warm.      Findings: Lesion (R heel, L dorsum with darkened skin) present.      Comments: Insicion on right inner thigh- c/d/I- no drainage or erythema seen.  Wound on left thigh- with dressing on top and drain in place with serosanguinous fluid.    Neurological:      General: No focal deficit present.      Mental Status: Mental status is at baseline.   Psychiatric:         Mood and Affect: Mood normal.       Significant Labs: All pertinent labs within the past 24 hours have been reviewed.  CBC:   Recent Labs   Lab 04/24/23  0548 04/24/23  1956 04/25/23  0455   WBC 9.95 10.87 14.73*   HGB 7.4* 6.6* 8.2*   HCT 24.1* 21.5* 26.2*    281 269     CMP:   Recent Labs   Lab 04/24/23  0548 04/25/23  0455    143   K 3.8 3.6    110   CO2 24 25   * 150*   BUN 54* 52*   CREATININE 2.5* 2.6*   CALCIUM 9.2 8.9   PROT 6.6 6.6   ALBUMIN 1.8* 1.9*   BILITOT 0.4 1.0   ALKPHOS 65 59   AST 17 16   ALT <5* 6*   ANIONGAP 8 8       Significant Imaging: I have reviewed all pertinent imaging results/findings within the past 24 hours.

## 2023-04-25 NOTE — PROGRESS NOTES
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 Albicans, C. Glabrata, and prevotella) on 6 week course of outpatient cefepime and micafungin (EOC 5/16/23), admitted with left foot pain and skin changes concerning for necrosis, LOLY,  and encephalopathy possibly 2/2 cefepime neurotoxicity.       Transitioned to zosyn in lieu of cefepime on 4/22 with improvement in mental status today, but noted to have worsening renal function despite fluids. Paniagua placed.  Renal US negative for hydronephrosis  Nephrology consulted. Blood cultures NGTD.  Urine culture pending. Blood cultures NGTD.   Afebrile, no leukocytosis.      Continue Zosyn IV renally dosed for now.   Continue IV micafungin.   Follow up urine studies.   Will follow up tomorrow.       Orthopedic  Wound infection    Due to wound dehiscence during prior admission.  · Antimicrobial therapy as above.  · Follow up with Plastic Surgery as outpatient per their last recommendations of 4/9/23    Neuro  Acute encephalopathy  Concern for cefepime neurotoxicity.   · Transitioned to zosyn 4/22 with improvement in mental status today.   Continue IV zosyn.     S/P transmetatarsal  "amputation of foot, right     S/p TMA 3/4/23 with wound dehiscence 3/10, cx + for Pseudomonas, s/p 14 days of oral cipro, noted to  have ischemic changes to right TMA site and left forefoot.  Podiatry has been following.  No clinical signs of infection.  No further Vascular intervention planned.       -  Continue Wound Care as recommended by Podiatry          Data reviewed and plan discussed with ID staff,      Thank you.   Please Secure Chat for any questions or concerns.  MABEL Banuelos, ANP-C      Subjective:     Principal Problem:Vascular inflammation or infection    HPI: An 82-year-old woman with HTN, DMII, DVT, CHF, PAD, DVT, left to right fem to SFA bypass and right SFA angioplasty with stent placement on 03/2/23, complicated by wound dehiscence of the left groin with graft exposure, and on outpatient cefepime plus micafungin (EOC5/16/23) who was admitted with left foot pain and skin changes concerning for necrosis. This was associated with progressive confusion. She was admitted to  service and continued on cefepime and micafungin. Her  reports confusion was worsened in the hospital since admission 2 days ago. He reports she has ongoing pain of her feet.    Infectious Diseases consulted for "Micafungin"        Interval History:   Mental status much improved. Alert/conversant/appropriate  Renal function deteriorating - Nephrology consulted.   Podiatry signed off     Review of Systems   Constitutional:  Positive for activity change. Negative for appetite change, chills and fever.   Respiratory:  Negative for cough and shortness of breath.    Cardiovascular:  Negative for chest pain.   Gastrointestinal:  Negative for abdominal pain, constipation, diarrhea, nausea and vomiting.   Genitourinary:  Positive for difficulty urinating.   Musculoskeletal:  Positive for arthralgias and gait problem.   Skin:  Positive for wound.   Neurological:  Negative for dizziness, weakness and headaches. "   Psychiatric/Behavioral:  Negative for confusion. The patient is not nervous/anxious.    Objective:     Vital Signs (Most Recent):  Temp: 98.1 °F (36.7 °C) (04/24/23 2013)  Pulse: 81 (04/24/23 2013)  Resp: 16 (04/24/23 2013)  BP: (!) 154/67 (04/24/23 2013)  SpO2: 95 % (04/24/23 2013) Vital Signs (24h Range):  Temp:  [98.1 °F (36.7 °C)-99.3 °F (37.4 °C)] 98.1 °F (36.7 °C)  Pulse:  [76-87] 81  Resp:  [15-29] 16  SpO2:  [93 %-98 %] 95 %  BP: (132-159)/(67-87) 154/67     Weight: 68 kg (150 lb)  Body mass index is 24.96 kg/m².    Estimated Creatinine Clearance: 15.6 mL/min (A) (based on SCr of 2.5 mg/dL (H)).    Physical Exam  Vitals and nursing note reviewed.   Constitutional:       General: She is not in acute distress.     Appearance: She is well-developed. She is not toxic-appearing or diaphoretic.   HENT:      Head: Normocephalic and atraumatic.      Mouth/Throat:      Mouth: Mucous membranes are moist.   Eyes:      General: No scleral icterus.     Conjunctiva/sclera: Conjunctivae normal.   Cardiovascular:      Rate and Rhythm: Normal rate and regular rhythm.      Heart sounds: Normal heart sounds.   Pulmonary:      Effort: Pulmonary effort is normal. No respiratory distress.      Breath sounds: No wheezing.   Abdominal:      General: Bowel sounds are normal. There is no distension.      Palpations: Abdomen is soft.      Tenderness: There is no abdominal tenderness.   Genitourinary:     Comments: Paniagua - urine dark, hematuria  Musculoskeletal:         General: No tenderness. Normal range of motion.   Skin:     General: Skin is warm and dry.      Comments: Dark skin discoloration over the dorsal aspect of the left foot.    Right foot covered dressed.   Podiatry photos reviewed.  See below    Left groin dressing c/d/I.  Drain in place   Neurological:      Mental Status: She is alert and oriented to person, place, and time.      Comments: Alert, oriented X 3. Answers all questions appropriately   Psychiatric:          Mood and Affect: Mood normal.         Behavior: Behavior normal.             Significant Labs: Blood Culture:   Recent Labs   Lab 03/08/23  2325 03/10/23  0942 03/10/23  0943 03/29/23 2009 04/20/23 2057   LABBLOO No growth after 5 days.  No growth after 5 days. No growth after 5 days. No growth after 5 days. No growth after 5 days.  No growth after 5 days. No Growth to date  No Growth to date  No Growth to date  No Growth to date  No Growth to date  No Growth to date  No Growth to date  No Growth to date       BMP:   Recent Labs   Lab 04/24/23  0548   *      K 3.8      CO2 24   BUN 54*   CREATININE 2.5*   CALCIUM 9.2   MG 1.6       CBC:   Recent Labs   Lab 04/23/23  1730 04/24/23  0548 04/24/23  1956   WBC 11.56 9.95 10.87   HGB 7.9* 7.4* 6.6*   HCT 24.9* 24.1* 21.5*    317 281       Urine Culture: No results for input(s): LABURIN in the last 4320 hours.  Urine Studies:   Recent Labs   Lab 04/23/23  1116 04/24/23  1502   COLORU Straw  Straw [place]*   APPEARANCEUA Cloudy*  Cloudy* Ex.Turbid   PHUR 6.0  6.0 6.0   SPECGRAV 1.015  1.015 1.015   PROTEINUA 1+*  1+* 2+*   GLUCUA Negative  Negative Negative   KETONESU Trace*  Trace* Trace*   BILIRUBINUA Negative  Negative Negative   OCCULTUA 1+*  1+* 3+*   NITRITE Negative  Negative Negative   LEUKOCYTESUR Negative  Negative 3+*   RBCUA 4 >100*   WBCUA 0 >100*   BACTERIA Rare Moderate*   SQUAMEPITHEL 1  --    HYALINECASTS 0 0       Wound Culture:   Recent Labs   Lab 03/04/23  1141 03/10/23  1340 03/31/23  0805 03/31/23  0809   LABAERO No growth PSEUDOMONAS FLUORESCENS GROUP  Few  * FELI ALBICANS  Rare  * FELI GLABRATA  Rare  *         Significant Imaging: I have reviewed all pertinent imaging results/findings within the past 24 hours.

## 2023-04-26 ENCOUNTER — ANESTHESIA (OUTPATIENT)
Dept: ENDOSCOPY | Facility: HOSPITAL | Age: 83
DRG: 682 | End: 2023-04-26
Payer: MEDICARE

## 2023-04-26 LAB
ALBUMIN SERPL BCP-MCNC: 2 G/DL (ref 3.5–5.2)
ALP SERPL-CCNC: 63 U/L (ref 55–135)
ALT SERPL W/O P-5'-P-CCNC: 5 U/L (ref 10–44)
ANION GAP SERPL CALC-SCNC: 10 MMOL/L (ref 8–16)
AST SERPL-CCNC: 14 U/L (ref 10–40)
BACTERIA BLD CULT: NORMAL
BACTERIA BLD CULT: NORMAL
BASOPHILS # BLD AUTO: 0.02 K/UL (ref 0–0.2)
BASOPHILS NFR BLD: 0.2 % (ref 0–1.9)
BILIRUB SERPL-MCNC: 0.4 MG/DL (ref 0.1–1)
BUN SERPL-MCNC: 51 MG/DL (ref 8–23)
CALCIUM SERPL-MCNC: 9 MG/DL (ref 8.7–10.5)
CHLORIDE SERPL-SCNC: 107 MMOL/L (ref 95–110)
CO2 SERPL-SCNC: 24 MMOL/L (ref 23–29)
COLLAGEN CTX SERPL-MCNC: 1182 PG/ML
CREAT SERPL-MCNC: 2.7 MG/DL (ref 0.5–1.4)
DIFFERENTIAL METHOD: ABNORMAL
EOSINOPHIL # BLD AUTO: 0.6 K/UL (ref 0–0.5)
EOSINOPHIL NFR BLD: 5.4 % (ref 0–8)
ERYTHROCYTE [DISTWIDTH] IN BLOOD BY AUTOMATED COUNT: 14.6 % (ref 11.5–14.5)
EST. GFR  (NO RACE VARIABLE): 17.1 ML/MIN/1.73 M^2
GLUCOSE SERPL-MCNC: 155 MG/DL (ref 70–110)
HCT VFR BLD AUTO: 25.3 % (ref 37–48.5)
HGB BLD-MCNC: 8 G/DL (ref 12–16)
IMM GRANULOCYTES # BLD AUTO: 0.04 K/UL (ref 0–0.04)
IMM GRANULOCYTES NFR BLD AUTO: 0.4 % (ref 0–0.5)
LYMPHOCYTES # BLD AUTO: 1.5 K/UL (ref 1–4.8)
LYMPHOCYTES NFR BLD: 15 % (ref 18–48)
MAGNESIUM SERPL-MCNC: 1.9 MG/DL (ref 1.6–2.6)
MCH RBC QN AUTO: 27.1 PG (ref 27–31)
MCHC RBC AUTO-ENTMCNC: 31.6 G/DL (ref 32–36)
MCV RBC AUTO: 86 FL (ref 82–98)
MONOCYTES # BLD AUTO: 0.4 K/UL (ref 0.3–1)
MONOCYTES NFR BLD: 3.6 % (ref 4–15)
NEUTROPHILS # BLD AUTO: 7.7 K/UL (ref 1.8–7.7)
NEUTROPHILS NFR BLD: 75.4 % (ref 38–73)
NRBC BLD-RTO: 0 /100 WBC
PHOSPHATE SERPL-MCNC: 3.2 MG/DL (ref 2.7–4.5)
PLATELET # BLD AUTO: 259 K/UL (ref 150–450)
PMV BLD AUTO: 11 FL (ref 9.2–12.9)
POCT GLUCOSE: 155 MG/DL (ref 70–110)
POCT GLUCOSE: 169 MG/DL (ref 70–110)
POCT GLUCOSE: 199 MG/DL (ref 70–110)
POCT GLUCOSE: 206 MG/DL (ref 70–110)
POCT GLUCOSE: 210 MG/DL (ref 70–110)
POTASSIUM SERPL-SCNC: 3.7 MMOL/L (ref 3.5–5.1)
PROT SERPL-MCNC: 6.9 G/DL (ref 6–8.4)
RBC # BLD AUTO: 2.95 M/UL (ref 4–5.4)
SODIUM SERPL-SCNC: 141 MMOL/L (ref 136–145)
WBC # BLD AUTO: 10.21 K/UL (ref 3.9–12.7)

## 2023-04-26 PROCEDURE — 25000003 PHARM REV CODE 250

## 2023-04-26 PROCEDURE — 99233 SBSQ HOSP IP/OBS HIGH 50: CPT | Mod: ,,, | Performed by: NURSE PRACTITIONER

## 2023-04-26 PROCEDURE — 99233 SBSQ HOSP IP/OBS HIGH 50: CPT | Mod: ,,, | Performed by: INTERNAL MEDICINE

## 2023-04-26 PROCEDURE — 37000009 HC ANESTHESIA EA ADD 15 MINS: Performed by: STUDENT IN AN ORGANIZED HEALTH CARE EDUCATION/TRAINING PROGRAM

## 2023-04-26 PROCEDURE — 36415 COLL VENOUS BLD VENIPUNCTURE: CPT

## 2023-04-26 PROCEDURE — D9220A PRA ANESTHESIA: ICD-10-PCS | Mod: CRNA,,, | Performed by: NURSE ANESTHETIST, CERTIFIED REGISTERED

## 2023-04-26 PROCEDURE — D9220A PRA ANESTHESIA: Mod: CRNA,,, | Performed by: NURSE ANESTHETIST, CERTIFIED REGISTERED

## 2023-04-26 PROCEDURE — 94761 N-INVAS EAR/PLS OXIMETRY MLT: CPT

## 2023-04-26 PROCEDURE — 43255 EGD CONTROL BLEEDING ANY: CPT | Performed by: STUDENT IN AN ORGANIZED HEALTH CARE EDUCATION/TRAINING PROGRAM

## 2023-04-26 PROCEDURE — 25000003 PHARM REV CODE 250: Performed by: NURSE ANESTHETIST, CERTIFIED REGISTERED

## 2023-04-26 PROCEDURE — 99233 PR SUBSEQUENT HOSPITAL CARE,LEVL III: ICD-10-PCS | Mod: ,,, | Performed by: NURSE PRACTITIONER

## 2023-04-26 PROCEDURE — 37000008 HC ANESTHESIA 1ST 15 MINUTES: Performed by: STUDENT IN AN ORGANIZED HEALTH CARE EDUCATION/TRAINING PROGRAM

## 2023-04-26 PROCEDURE — 99233 PR SUBSEQUENT HOSPITAL CARE,LEVL III: ICD-10-PCS | Mod: ,,, | Performed by: INTERNAL MEDICINE

## 2023-04-26 PROCEDURE — 43255 EGD CONTROL BLEEDING ANY: CPT | Mod: ,,, | Performed by: STUDENT IN AN ORGANIZED HEALTH CARE EDUCATION/TRAINING PROGRAM

## 2023-04-26 PROCEDURE — 84100 ASSAY OF PHOSPHORUS: CPT

## 2023-04-26 PROCEDURE — 63600175 PHARM REV CODE 636 W HCPCS: Performed by: INTERNAL MEDICINE

## 2023-04-26 PROCEDURE — 63600175 PHARM REV CODE 636 W HCPCS

## 2023-04-26 PROCEDURE — D9220A PRA ANESTHESIA: ICD-10-PCS | Mod: ANES,,, | Performed by: STUDENT IN AN ORGANIZED HEALTH CARE EDUCATION/TRAINING PROGRAM

## 2023-04-26 PROCEDURE — 43255 PR EGD, FLEX, W/CTRL BLEED, ANY METHOD: ICD-10-PCS | Mod: ,,, | Performed by: STUDENT IN AN ORGANIZED HEALTH CARE EDUCATION/TRAINING PROGRAM

## 2023-04-26 PROCEDURE — 25000003 PHARM REV CODE 250: Performed by: INTERNAL MEDICINE

## 2023-04-26 PROCEDURE — D9220A PRA ANESTHESIA: Mod: ANES,,, | Performed by: STUDENT IN AN ORGANIZED HEALTH CARE EDUCATION/TRAINING PROGRAM

## 2023-04-26 PROCEDURE — 63600175 PHARM REV CODE 636 W HCPCS: Performed by: NURSE ANESTHETIST, CERTIFIED REGISTERED

## 2023-04-26 PROCEDURE — 11000001 HC ACUTE MED/SURG PRIVATE ROOM

## 2023-04-26 PROCEDURE — 86580 TB INTRADERMAL TEST: CPT | Performed by: INTERNAL MEDICINE

## 2023-04-26 PROCEDURE — 30200315 PPD INTRADERMAL TEST REV CODE 302: Performed by: INTERNAL MEDICINE

## 2023-04-26 PROCEDURE — 99900035 HC TECH TIME PER 15 MIN (STAT)

## 2023-04-26 PROCEDURE — 83735 ASSAY OF MAGNESIUM: CPT

## 2023-04-26 PROCEDURE — 25000003 PHARM REV CODE 250: Performed by: STUDENT IN AN ORGANIZED HEALTH CARE EDUCATION/TRAINING PROGRAM

## 2023-04-26 PROCEDURE — 85025 COMPLETE CBC W/AUTO DIFF WBC: CPT

## 2023-04-26 PROCEDURE — 80053 COMPREHEN METABOLIC PANEL: CPT

## 2023-04-26 RX ORDER — PROPOFOL 10 MG/ML
VIAL (ML) INTRAVENOUS CONTINUOUS PRN
Status: DISCONTINUED | OUTPATIENT
Start: 2023-04-26 | End: 2023-04-26

## 2023-04-26 RX ORDER — AMLODIPINE BESYLATE 10 MG/1
10 TABLET ORAL DAILY
Status: DISCONTINUED | OUTPATIENT
Start: 2023-04-27 | End: 2023-05-05 | Stop reason: HOSPADM

## 2023-04-26 RX ORDER — INSULIN ASPART 100 [IU]/ML
4 INJECTION, SOLUTION INTRAVENOUS; SUBCUTANEOUS
Status: ON HOLD | COMMUNITY
End: 2023-05-05 | Stop reason: HOSPADM

## 2023-04-26 RX ORDER — PROPOFOL 10 MG/ML
VIAL (ML) INTRAVENOUS
Status: DISCONTINUED | OUTPATIENT
Start: 2023-04-26 | End: 2023-04-26

## 2023-04-26 RX ORDER — DEXTROMETHORPHAN HYDROBROMIDE, GUAIFENESIN 5; 100 MG/5ML; MG/5ML
650-1300 LIQUID ORAL EVERY 8 HOURS PRN
Status: ON HOLD | COMMUNITY
End: 2023-04-30 | Stop reason: HOSPADM

## 2023-04-26 RX ORDER — INSULIN DETEMIR 100 [IU]/ML
9 INJECTION, SOLUTION SUBCUTANEOUS NIGHTLY
Status: ON HOLD | COMMUNITY
End: 2023-05-05 | Stop reason: SDUPTHER

## 2023-04-26 RX ORDER — OXYCODONE HYDROCHLORIDE 5 MG/1
5 TABLET ORAL EVERY 4 HOURS PRN
Status: DISCONTINUED | OUTPATIENT
Start: 2023-04-26 | End: 2023-05-05 | Stop reason: HOSPADM

## 2023-04-26 RX ORDER — ACETAMINOPHEN 325 MG/1
650 TABLET ORAL EVERY 8 HOURS PRN
Status: DISCONTINUED | OUTPATIENT
Start: 2023-04-26 | End: 2023-04-28

## 2023-04-26 RX ORDER — DIPHENHYDRAMINE HCL 25 MG
25 CAPSULE ORAL DAILY PRN
Status: ON HOLD | COMMUNITY
End: 2023-08-03 | Stop reason: HOSPADM

## 2023-04-26 RX ORDER — HYDROCODONE BITARTRATE AND ACETAMINOPHEN 5; 325 MG/1; MG/1
1 TABLET ORAL EVERY 6 HOURS PRN
Status: ON HOLD | COMMUNITY
Start: 2023-04-20 | End: 2023-04-30 | Stop reason: HOSPADM

## 2023-04-26 RX ORDER — CARVEDILOL 12.5 MG/1
12.5 TABLET ORAL 2 TIMES DAILY
Status: DISCONTINUED | OUTPATIENT
Start: 2023-04-26 | End: 2023-05-03

## 2023-04-26 RX ORDER — LIDOCAINE HYDROCHLORIDE 20 MG/ML
INJECTION INTRAVENOUS
Status: DISCONTINUED | OUTPATIENT
Start: 2023-04-26 | End: 2023-04-26

## 2023-04-26 RX ADMIN — SODIUM CHLORIDE: 0.9 INJECTION, SOLUTION INTRAVENOUS at 10:04

## 2023-04-26 RX ADMIN — Medication 125 MCG/KG/MIN: at 10:04

## 2023-04-26 RX ADMIN — PIPERACILLIN SODIUM AND TAZOBACTAM SODIUM 4.5 G: 4; .5 INJECTION, POWDER, FOR SOLUTION INTRAVENOUS at 03:04

## 2023-04-26 RX ADMIN — LIDOCAINE HYDROCHLORIDE 50 MG: 20 INJECTION INTRAVENOUS at 10:04

## 2023-04-26 RX ADMIN — MUPIROCIN: 20 OINTMENT TOPICAL at 08:04

## 2023-04-26 RX ADMIN — PANTOPRAZOLE SODIUM 40 MG: 40 TABLET, DELAYED RELEASE ORAL at 03:04

## 2023-04-26 RX ADMIN — CARVEDILOL 12.5 MG: 12.5 TABLET, FILM COATED ORAL at 09:04

## 2023-04-26 RX ADMIN — MUPIROCIN: 20 OINTMENT TOPICAL at 12:04

## 2023-04-26 RX ADMIN — AMLODIPINE BESYLATE 10 MG: 10 TABLET ORAL at 09:04

## 2023-04-26 RX ADMIN — CLOPIDOGREL BISULFATE 75 MG: 75 TABLET ORAL at 09:04

## 2023-04-26 RX ADMIN — DULOXETINE 30 MG: 30 CAPSULE, DELAYED RELEASE ORAL at 08:04

## 2023-04-26 RX ADMIN — ATORVASTATIN CALCIUM 40 MG: 40 TABLET, FILM COATED ORAL at 08:04

## 2023-04-26 RX ADMIN — CARVEDILOL 12.5 MG: 12.5 TABLET, FILM COATED ORAL at 08:04

## 2023-04-26 RX ADMIN — PROPOFOL 50 MG: 10 INJECTION, EMULSION INTRAVENOUS at 10:04

## 2023-04-26 RX ADMIN — TUBERCULIN PURIFIED PROTEIN DERIVATIVE 5 UNITS: 5 INJECTION, SOLUTION INTRADERMAL at 03:04

## 2023-04-26 RX ADMIN — DULOXETINE 30 MG: 30 CAPSULE, DELAYED RELEASE ORAL at 09:04

## 2023-04-26 RX ADMIN — Medication: at 09:04

## 2023-04-26 RX ADMIN — MICAFUNGIN SODIUM 100 MG: 100 INJECTION, POWDER, LYOPHILIZED, FOR SOLUTION INTRAVENOUS at 09:04

## 2023-04-26 RX ADMIN — PANTOPRAZOLE SODIUM 40 MG: 40 TABLET, DELAYED RELEASE ORAL at 06:04

## 2023-04-26 NOTE — TREATMENT PLAN
BRIEF GI TREATMENT PLAN    Findings on EGD  today:  - Normal esophagus.   - Red blood in the gastric body.   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 No bleeding at end of procedure.    - Normal duodenum.    Recommendations:   - Return patient to hospital [facility] for ongoing care.  Trend Hb &Hct.   - Resume previous diet.   - Return to GI clinic as previously scheduled.     Mckay Moe MD  PGY-IV, GI

## 2023-04-26 NOTE — PROGRESS NOTES
[facility] Medicine  Progress Note    Patient Name: Nelsy Marino  MRN: [id number]  Patient Class: IP- Inpatient   Admission Date: 4/20/2023  Length of Stay: 4 days  Attending Physician: Kay Benavidez MD  Primary Care Provider: Juan F Garay        Subjective:     Principal Problem:LOLY (acute kidney injury)        HPI:  This is an 82-year-old female with a past medical history of severe PAD, hypertension, type 2 diabetes, HFpEF (EF 65%), L femoral bypass and L leg flap, R toe ampuations 2 months prior with presenting after  noted waxing/waning confusion since her discharge from hospital two weeks ago. History largely provided by  at bedside as patient is intermittently somnolent. She was discharged on IV Cefepime and Micafungin for her R foot amputation and patient reports adherence with regimen at home. He grew concerned that int he past two weeks she had worsening LLE pain and darkened skin, which is what happened before her R toes were amputated. His niece contacted a nurse who recommneded ED evaluation. No fevers, chills, HA, chest pain, SOB, N/V/D, abdominal pain.    Pt receives  nurse aide, PT/OT since hospital DC and ID very recently made a referral to podiatry and wound care, but appointments not yet made.     In the ED patient was hemodynamically stable and hypertensive, intermittently on RA to 2L NC. Leukocytosis to 13, Hgb 7.5, Cr 2.1, Albumin 2.1, BCx in process, CT head and CXR wnl. Started on BS Abx with continued Cefepime, [place], started on Vanc pending Bcx, US LE arterial with diminished flow throughout the left lower extremity. Monophasic waveforms suggesting atherosclerotic disease. Admitted to  for LOLY and Podiatry/Wound care.       Overview/Hospital Course:  Admitted to hospital medicine for LOLY and left foot pain. Cr on admission 2.1 (bl.8-1.5), improved with IVF initially. After holding IVF Cr increased, and then unresponsive to fluids. Patient with  significant urine retention, rivas placed and Cr again increased. Consulted Nephrology for assistance. Vascular surgery consulted, recommended repeat ARCHIE and left foot arterial doppler. ID consulted, patient was receiving cefepime and micafungin outpatient per ID following R foot transmetatarsal amputation. Started on vanc, cefepime, and micafungin. Podiatry and wound care consulted. Patient more lethargic 4/22 although able to follow commands and move extremities on exam. With concern for cefepime toxicity, ID removed cefepime and vancomycin. Micafungin and zosyn continued. EEG and CTH ordered. CTH no acute abnormalities. Patient significantly improved following d/c Cefepime. Continued on IVF for LOLY and rivas placed for urinary retention. SLP consulted recommended pureed dysphagia diet. Patient having increased soft bowel movements with melena 4/25. GI consulted performed EGD with small bowel follow through 4/26 revealed red blood in the gastric body with three bleeding angioectasias in the stomach treated with APC.       Interval History: This morning patient continued to have melena. Hemodynamically stable. Appropriate response to blood transfusion yesterday. EGD with small bowel follow through today. Patient mentation greatly improved since admission    Review of Systems   Constitutional:  Negative for activity change, appetite change and fever.   HENT:  Negative for congestion and sore throat.    Respiratory:  Negative for cough, shortness of breath and wheezing.    Cardiovascular:  Negative for chest pain and leg swelling.   Gastrointestinal:  Negative for abdominal pain, constipation, diarrhea, nausea and vomiting.        Melena   Genitourinary:  Negative for difficulty urinating and dysuria.   Musculoskeletal:  Positive for arthralgias and myalgias.   Skin:  Positive for color change and wound. Negative for rash.   Allergic/Immunologic: Negative.    Neurological:  Negative for dizziness and headaches.    Hematological: Negative.    Objective:     Vital Signs (Most Recent):  Temp: 96.8 °F (36 °C) (04/26/23 1119)  Pulse: 71 (04/26/23 1132)  Resp: (!) 25 (04/26/23 1132)  BP: (!) 89/47 (04/26/23 1132)  SpO2: 98 % (04/26/23 1132)   Vital Signs (24h Range):  Temp:  [96.8 °F (36 °C)-99 °F (37.2 °C)] 96.8 °F (36 °C)  Pulse:  [70-86] 71  Resp:  [16-25] 25  SpO2:  [93 %-100 %] 98 %  BP: ()/(46-76) 89/47     Weight: 68 kg (150 lb)  Body mass index is 24.96 kg/m².    Intake/Output Summary (Last 24 hours) at 4/26/2023 1319  Last data filed at 4/26/2023 1108  Gross per 24 hour   Intake 200 ml   Output 805 ml   Net -605 ml        Physical Exam  Vitals and nursing note reviewed.   Constitutional:       General: She is not in acute distress.     Appearance: She is not ill-appearing.   HENT:      Head: Normocephalic and atraumatic.      Mouth/Throat:      Mouth: Mucous membranes are moist.      Pharynx: Oropharynx is clear.   Eyes:      Extraocular Movements: Extraocular movements intact.      Conjunctiva/sclera: Conjunctivae normal.   Cardiovascular:      Rate and Rhythm: Regular rhythm. Tachycardia present.      Pulses: Normal pulses.   Pulmonary:      Effort: Pulmonary effort is normal. No respiratory distress.      Breath sounds: No wheezing or rales.   Abdominal:      Palpations: Abdomen is soft.      Tenderness: There is no abdominal tenderness. There is no guarding.   Genitourinary:     Comments: Black stool on diaper  Musculoskeletal:      Cervical back: Normal range of motion and neck supple.      Right lower leg: No edema.      Left lower leg: No edema.   Skin:     General: Skin is warm.      Findings: Lesion (R heel, L dorsum with darkened skin) present.      Comments: Insicion on right inner thigh- c/d/I- no drainage or erythema seen.  Wound on left thigh- with dressing on top and drain in place with serosanguinous fluid.    Neurological:      General: No focal deficit present.      Mental Status: Mental status is  at baseline.   Psychiatric:         Mood and Affect: Mood normal.       Significant Labs: All pertinent labs within the past 24 hours have been reviewed.  CBC:   Recent Labs   Lab 04/25/23  0455 04/25/23  1643 04/26/23  0325   WBC 14.73* 10.42 10.21   HGB 8.2* 8.1* 8.0*   HCT 26.2* 25.4* 25.3*    262 259       CMP:   Recent Labs   Lab 04/25/23  0455 04/26/23  0325    141   K 3.6 3.7    107   CO2 25 24   * 155*   BUN 52* 51*   CREATININE 2.6* 2.7*   CALCIUM 8.9 9.0   PROT 6.6 6.9   ALBUMIN 1.9* 2.0*   BILITOT 1.0 0.4   ALKPHOS 59 63   AST 16 14   ALT 6* 5*   ANIONGAP 8 10         Significant Imaging: I have reviewed all pertinent imaging results/findings within the past 24 hours.      Assessment/Plan:      * LOLY (acute kidney injury)  Patient with acute kidney injury likely due to IVVD/dehydration LOLY is currently worsening. Labs reviewed- Renal function/electrolytes with Estimated Creatinine Clearance: 14.5 mL/min (A) (based on SCr of 2.7 mg/dL (H)). according to latest data. Monitor urine output and serial BMP and adjust therapy as needed. Avoid nephrotoxins and renally dose meds for GFR listed above.     Cr 2.1 (baseline 0.8-1.5).  Patient dry on physical exam.  Poor oral intake, thought to be prerenal in origin initially. Possible due to acute on chronic anemia.    Recent Labs   Lab 04/25/23  0455 04/26/23  0325    141   K 3.6 3.7    107   CO2 25 24   BUN 52* 51*   CREATININE 2.6* 2.7*     - Nephrology consulted; appreciate assistance   - Paniagua placed after patient retaining on bladder scan 4/23 with hematuria 4/24  - RP u/s no hydronephrosis  - Urine lytes showed possible intrinsic etiology  - CMP qd, trend Cr  - strict I/O  - daily weights  - renally dose all medications  - avoid nephrotoxic agents, NSAIDs, IV contrast, ACE/ARB      Debility  - PT/OT reconsulted  - Fall precautions  - Established with  PT/OT/nurse aide prior to hospitalization  -  assisting in SNF  placement       Skin ulcer of right heel, limited to breakdown of skin  See PVD for plan    - Wound care and podiatry consulted with appreciated mgmt recs      Acute on chronic anemia  Baseline 8.6-11.   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  Patient continues to have melena and requiring blood transfusion    GI consulted push enteroscopy 4/26 showed red blood in the gastric body and three bleeding angioectasias in the stomach treated with APC    - Continue PPI BID  - Maintain 2 large bore Ivs for volume resuscitation  - Trend CBC BID. Transfuse pRBC for Hb < 7 g/dL  - Stopped Heparin for DVT ppx and ASA. Will continue with only plavix for now.         Acute encephalopathy  A&O x3 on admission with waxing/waning per  at the bedside.  says she has been largely bed bound since discharge, but her waxing confusion was concerning to him. Patients mentation significantly improved after stopping cefepime.  Resolved  - Repeat CT Head with no acute change  - EEG without seizures  - TSH, B12, B1, Vit E, Ammonia, RPR were unremarkable  - Cefepime changed to Zosyn  - F/u blood and Urine cultures      S/P transmetatarsal amputation of foot, right  Appreciated evaluation by Podiatry      Diastolic CHF, chronic  Patient is identified as having Diastolic (HFpEF) heart failure that is Chronic. CHF is currently controlled. Latest ECHO performed and demonstrates- Results for orders placed during the hospital encounter of  02/27/23    Echo    Interpretation Summary  · The estimated ejection fraction is 65%.  · The left ventricle is normal in size with normal systolic function.  · Grade II left ventricular diastolic dysfunction.  · Normal right ventricular size with normal right ventricular systolic function.  · Mild left atrial enlargement.  · Mild-to-moderate mitral regurgitation.  · There is pulmonary hypertension.  · The estimated PA systolic pressure is 55 mmHg.  · Intermediate central venous pressure (8 mmHg).  . Continue Beta Blocker and Furosemide and monitor clinical status closely. Monitor on telemetry. Patient is off CHF pathway.  Monitor strict Is&Os and daily weights.  Place on fluid restriction of 1.5 L. Continue to stress to patient importance of self efficacy and  on diet for CHF. Last BNP reviewed- and noted below No results for input(s): BNP, BNPTRIAGEBLO in the last 168 hours..    - Holding home Lasix in setting of LOLY  - Holding Hyzaar (HCTZ) in setting of LOLY  - Patient breathing comfortably on RA, no signs of volume overload      Type 2 diabetes mellitus with chronic kidney disease, with long-term current use of insulin  Patient's FSGs are uncontrolled due to hyperglycemia on current medication regimen.  Last A1c reviewed-   Lab Results   Component Value Date    HGBA1C 8.2 (H) 02/28/2023     Most recent fingerstick glucose reviewed-   Recent Labs   Lab 04/23/23  1614 04/23/23  2057 04/24/23  0719 04/24/23  1138   POCTGLUCOSE 197* 216* 154* 167*     Current correctional scale  Low  Maintain anti-hyperglycemic dose as follows-   Antihyperglycemics (From admission, onward)    Start     Stop Route Frequency Ordered    04/21/23 0355  insulin aspart U-100 pen 0-5 Units         -- SubQ Before meals & nightly PRN 04/21/23 0255        Hold Oral hypoglycemics while patient is in the hospital.    Essential hypertension  - Continue home Coreg, amlodipine  - hold Hyzaar in setting of LOLY      PAD (peripheral artery  disease)  Recently discharged home with IV Cefepime 2g Q12 hours + IV Micafungin 100mg Q24 hours for estimated end of therapy date of 5/16/2023. For R toe amputation. Patient's  reports adherence with IV Abx. L foot with darkening skin changes, which alarmed  as this was how skin presented prior to toe amputations of R foot. RLE warm to touch with L US Lower Extremity Arteries demonstrating diminished flow throughout the left lower extremity. Monophasic waveforms suggesting atherosclerotic disease.     - Podiatry and Vascular Surgery following; appreciate recs  - Continue ASA, atorvastatin, Plavix  - Continue Micafungin and Zosyn per prior ID recs      VTE Risk Mitigation (From admission, onward)         Ordered     IP VTE HIGH RISK PATIENT  Once         04/21/23 0254     Place sequential compression device  Until discontinued         04/21/23 0254                Discharge Planning   JUAN ALBERTO: 4/28/2023     Code Status: Full Code   Is the patient medically ready for discharge?: No    Reason for patient still in hospital (select all that apply): Patient trending condition, Consult recommendations and Pending disposition  Discharge Plan A: Home Health (with home infusion)                  Boris Mckeon DO  Department of Hospital Medicine   [facility] Surg

## 2023-04-26 NOTE — ASSESSMENT & PLAN NOTE
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 Albicans, C. Glabrata, and prevotella) on 6 week course of outpatient cefepime and micafungin (EOC 5/16/23), admitted with left foot pain and skin changes concerning for necrosis, LOLY,  and encephalopathy possibly 2/2 cefepime neurotoxicity.       Transitioned to zosyn in lieu of cefepime on 4/22 with improvement in mental status  Continued worsening renal function despite fluids. Paniagua placed for urinary retention  U/A concerning for UTI, cx pending.  Denies dysuria.  Renal US negative for hydronephrosis  Nephrology following. Blood cultures NGTD.       Melanotic stools today - reports diarrhea X 5 today, though patient reports she received something for constipation?  Blood transfusion overnight.  EGD tomorrow.  Mild leukocytosis this morning ( ? 2/2 unit blood), resolved this afternoon. Mild eosinophilia noted      Continue Zosyn IV renally dosed   Continue IV micafungin.   Will add on Hall's stain    Will follow up tomorrow

## 2023-04-26 NOTE — PHARMACY MED REC
"  Admission Medication History     The home medication history was taken by Opal Cortez.    You may go to "Admission" then "Reconcile Home Medications" tabs to review and/or act upon these items.     The home medication list has been updated by the Pharmacy department.   Please read ALL comments highlighted in yellow.   Please address this information as you see fit.    Feel free to contact us if you have any questions or require assistance.      The medications listed below were removed from the home medication list. Please reorder if appropriate:  Patient reports no longer taking the following medication(s):  AMLODIPINE 10 MG  METFORMIN 1000 MG  ONDANSETRON 8 MG    Medications listed below were obtained from: [place] - spouse  PTA Medications   Medication Sig    acetaminophen (TYLENOL) 650 MG TbSR   Take 650-1,300 mg by mouth every 8 (eight) hours as needed (Pain).    atorvastatin (LIPITOR) 40 MG tablet   Take 1 tablet (40 mg total) by mouth every evening.    CarvediloL (COREG) 12.5 MG tablet   Take 12.5 mg by mouth 2 (two) times daily.    cephALEXin (KEFLEX) 500 MG capsule   Take 2 capsules (1,000 mg total) by mouth every 12 (twelve) hours.    clopidogreL (PLAVIX) 75 mg tablet   Take 1 tablet (75 mg total) by mouth once daily.    diphenhydrAMINE (BENADRYL) 25 mg capsule   Take 25 mg by mouth daily as needed for Itching.    DULoxetine (CYMBALTA) 30 MG capsule   Take 1 capsule (30 mg total) by mouth 2 (two) times daily.    furosemide (LASIX) 20 MG tablet   Take 1 tablet (20 mg total) by mouth once daily.    HYDROcodone-acetaminophen (NORCO) 5-325 mg per tablet   Take 1 tablet by mouth every 6 (six) hours as needed for Pain.    insulin aspart U-100 (NOVOLOG) 100 unit/mL injection   Inject 4 Units into the skin 3 (three) times daily before meals.    insulin detemir U-100, Levemir, (LEVEMIR FLEXTOUCH U-100 INSULN) 100 unit/mL (3 mL) InPn pen   Inject 9 Units into the skin every evening.    losartan-hydrochlorothiazide " 100-25 mg (HYZAAR) 100-25 mg per tablet   Take 1 tablet by mouth once daily.    pantoprazole (PROTONIX) 40 MG tablet   Take 1 tablet (40 mg total) by mouth 2 (two) times daily.    aspirin (ECOTRIN) 81 MG EC tablet   Take 81 mg by mouth once daily.    [] benzonatate (TESSALON) 100 MG capsule   Take 1 capsule (100 mg total) by mouth 3 (three) times daily as needed for Cough.    BLOOD SUGAR DIAGNOSTIC (TRUE METRIX GLUCOSE TEST STRIP MISC)   by Misc.(Non-Drug; Combo Route) route.     Potential issues to be addressed PRIOR TO DISCHARGE  Patient reported not taking the following medications: (ASPIRIN, NOVOLOG MIX 70-30, ROXICODONE). These medications remain on the home medication list. Please address accordingly.     Patient requires education regarding drug therapies           Opal Cortez  EXT [id number]                .

## 2023-04-26 NOTE — ASSESSMENT & PLAN NOTE
Ms Marino is a 83yo lady being treated for PAD associated with acute left foot pain and left foot skin changes. 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 Podiatry consulted for foot wounds with no new findings of infection. Pt presented with elevated creatinine concerning for LOLY, baseline previously < 1.0. GFR with Creatinine Clearance at 23% and 2.1mg/dL on 4/20/23 at 2059. 2L of IV LR infused from 0921on 4/21/23 to 0920 on 4/22/23. GFR and Creatinine Clearance increased as expected to 32% and 1.6mg/dL on 4/21/23 at 0400. [place] in place to r/o retention complications. No reported dysuria prior to admission and negative for UTI on admission. Retroperitoneal US was negative for hydronephrosis but elevated bilateral resistive indices concerning for medical renal disease. Progressive encephalopathy likely associated with neurotoxicity from cefepime therapy, held on 4/22/23. Switched to Zosyn and Micafungin for continued infectious coverage. ID consulted for continued antibiotic stewardship. Decreased Hgb of 6.6 on 4/22/23 concerning for GIB. One unit of pRBCs added to improve Hgb to 8.2. GFR with Creatinine Clearance at 18.7% and Cr of 2.5mg/dL on 4/24/23 despite fluid resuscitation. Urine analysis with reflex significant for >100 WBCs with moderate bacteria, cultures negative. Labs elevated for urine protein/creatinine (1.18), urine protein (105), serum protein (5.9) serum kappa/lambda light chains (13.76/8.39). Proteinuria nonspecific and likely secondary to LOLY. Eosinophilia present at 0.6k/ml, sanchez stain ordered to investigate for interstitial nephritis. Multiple loose, lex-colored, BMs noted on 4/25/23 and 4/26/23. Pt taken for EGD with findings of three small angioectasias with bleeding in the gastric body. Bleeding resolved with argon plasma coagulation.      Recommendations:  --Trend CBC and Hgb in setting of GIB   --Transfuse pRBCs for Hgb < 7.0 (<8.0  for active bleed)   --IV fluid resuscitation  --Renal diet  --Monitor I/Os for fluid status  --Trend/correct electrolytes  --Avoid nephrotoxic agents

## 2023-04-26 NOTE — ASSESSMENT & PLAN NOTE
A&O x3 on admission with waxing/waning per  at the bedside.  says she has been largely bed bound since discharge, but her waxing confusion was concerning to him. Patients mentation significantly improved after stopping cefepime.  Resolved  - Repeat CT Head with no acute change  - EEG without seizures  - TSH, B12, B1, Vit E, Ammonia, RPR were unremarkable  - Cefepime changed to Zosyn  - F/u blood and Urine cultures

## 2023-04-26 NOTE — PLAN OF CARE
142 obtained & forwarded to pending SNFs along with PASRR/CXR/PPD - secure chat to therapy requesting updated therapy notes for [employer] to review once accepting facility submits for auth

## 2023-04-26 NOTE — PLAN OF CARE
Spoke with daughter Ashwin - updated her to pending SNFs - she will review [facility], [facility], [facility], & [facility]

## 2023-04-26 NOTE — PLAN OF CARE
Patient in bed awake with confusion noted at times, no signs or symptoms of distress noted, breathing even and unlabored, pts sister at bedside,  wound vac placed to left groin, no output noted at present, will continue to observe.     Problem: Adult Inpatient Plan of Care  Goal: Optimal Comfort and Wellbeing  Outcome: Ongoing, Progressing  Intervention: Monitor Pain and Promote Comfort  Flowsheets (Taken 4/26/2023 1832)  Pain Management Interventions: position adjusted     Problem: Diabetes Comorbidity  Goal: Blood Glucose Level Within Targeted Range  Outcome: Ongoing, Progressing     Problem: Adjustment to Illness (Sepsis/Septic Shock)  Goal: Optimal Coping  Intervention: Optimize Psychosocial Adjustment to Illness  Flowsheets (Taken 4/26/2023 1832)  Supportive Measures:   self-care encouraged   active listening utilized

## 2023-04-26 NOTE — H&P
Short Stay Endoscopy History and Physical    PCP - [facility]     Procedure - EGD  ASA - per anesthesia  Mallampati - per anesthesia  History of Anesthesia problems - no  Family history Anesthesia problems -  no   Plan of anesthesia - General    HPI:  This is a 82 y.o. female here for evaluation of :     Melena, Hx of AVM      ROS:  Constitutional: No fevers, chills, No weight loss  CV: No chest pain  Pulm: No cough, No shortness of breath  Ophtho: No vision changes  GI: see HPI  Derm: No rash    Medical History:  has a past medical history of CHF (congestive heart failure), Diabetes mellitus, DVT (deep venous thrombosis), Hypertension, and Miscarriage.    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    Family History: family history includes Diabetes in her father; Hypertension in her father.. Otherwise no colon cancer, inflammatory bowel disease, or GI malignancies.    Social History:  reports that she quit smoking about 43 years ago. Her smoking use included cigarettes. She has never used smokeless tobacco. She reports that she does not drink alcohol and does not use drugs.    Review of patient's allergies indicates:   Allergen Reactions    Motrin [ibuprofen] Itching       Medications:   Medications Prior to Admission   Medication Sig Dispense Refill Last Dose    amlodipine (NORVASC) 10 MG  "tablet Take 10 mg by mouth once daily.       aspirin (ECOTRIN) 81 MG EC tablet Take 81 mg by mouth once daily.       atorvastatin (LIPITOR) 40 MG tablet Take 1 tablet (40 mg total) by mouth every evening. 30 tablet 0     [] benzonatate (TESSALON) 100 MG capsule Take 1 capsule (100 mg total) by mouth 3 (three) times daily as needed for Cough. 20 capsule 0     BLOOD SUGAR DIAGNOSTIC (TRUE METRIX GLUCOSE TEST STRIP MISC) by Misc.(Non-Drug; Combo Route) route.       carvediloL (COREG) 12.5 MG tablet Take 12.5 mg by mouth 2 (two) times daily.       cephALEXin (KEFLEX) 500 MG capsule Take 2 capsules (1,000 mg total) by mouth every 12 (twelve) hours. 60 capsule 0     clopidogreL (PLAVIX) 75 mg tablet Take 1 tablet (75 mg total) by mouth once daily. 30 tablet 0     DULoxetine (CYMBALTA) 30 MG capsule Take 1 capsule (30 mg total) by mouth 2 (two) times daily. 60 capsule 0     furosemide (LASIX) 20 MG tablet Take 1 tablet (20 mg total) by mouth once daily. 30 tablet 0     insulin aspart protamine-insulin aspart (NOVOLOG MIX 70-30FLEXPEN U-100) 100 unit/mL (70-30) InPn pen Inject 12 Units into the skin 2 (two) times daily before meals.  0     insulin syringe-needle,dispos. 0.3 mL 30 gauge x 5/16" Syrg by Misc.(Non-Drug; Combo Route) route.       losartan-hydrochlorothiazide 100-25 mg (HYZAAR) 100-25 mg per tablet Take 1 tablet by mouth once daily.       metformin (GLUCOPHAGE) 1000 MG tablet Take 1,000 mg by mouth 2 (two) times daily with meals.       ondansetron (ZOFRAN) 8 MG tablet   0     oxyCODONE (ROXICODONE) 5 MG immediate release tablet Take 1 tablet (5 mg total) by mouth every 6 (six) hours as needed for Pain. 12 tablet 0     pantoprazole (PROTONIX) 40 MG tablet Take 1 tablet (40 mg total) by mouth 2 (two) times daily. 60 tablet 1     pen needle, diabetic 32 gauge x 5/32" Ndle Inject 1 each into the skin 4 (four) times daily. 100 each 0        Physical Exam:    Vital Signs:   Vitals:    23 0958   BP: (!) " 158/70   Pulse: 78   Resp: 16   Temp: 97.5 °F (36.4 °C)       General Appearance: Well appearing in no acute distress  Eyes:    No scleral icterus  ENT: Neck supple, Lips, mucosa, and tongue normal; teeth and gums normal  Abdomen: Soft, non tender, non distended with normal bowel sounds. No hepatosplenomegaly, ascites, or mass.  Extremities: No edema  Skin: No rash    Labs:  Lab Results   Component Value Date    WBC 10.21 04/26/2023    HGB 8.0 (L) 04/26/2023    HCT 25.3 (L) 04/26/2023     04/26/2023    CHOL 109 (L) 02/28/2023    TRIG 58 02/28/2023    HDL 38 (L) 02/28/2023    ALT 5 (L) 04/26/2023    AST 14 04/26/2023     04/26/2023    K 3.7 04/26/2023     04/26/2023    CREATININE 2.7 (H) 04/26/2023    BUN 51 (H) 04/26/2023    CO2 24 04/26/2023    TSH 1.304 04/22/2023    INR 1.2 04/04/2023    HGBA1C 8.2 (H) 02/28/2023       I have explained the risks and benefits of endoscopy procedures to the patient including but not limited to bleeding, perforation, infection, and death.  The patient was asked if they understand and allowed to ask any further questions to their satisfaction.      Mckay Moe MD

## 2023-04-26 NOTE — ASSESSMENT & PLAN NOTE
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  Patient continues to have melena and requiring blood transfusion    GI consulted push enteroscopy 4/26 showed red blood in the gastric body and three bleeding angioectasias in the stomach treated with APC    - Continue PPI BID  - Maintain 2 large bore Ivs for volume resuscitation  - Trend CBC BID. Transfuse pRBC for Hb < 7 g/dL  - Stopped Heparin for DVT ppx and ASA. Will continue with only plavix for now.

## 2023-04-26 NOTE — NURSING TRANSFER
Nursing Transfer Note      4/26/2023     Reason patient is being transferred: per md order    Transfer To: 646    Transfer via stretcher    Transfer with cardiac monitoring & dentures in cup    Transported by pct    Medicines sent: n/a    Any special needs or follow-up needed: n/a    Chart send with patient: Yes    Notified: friend

## 2023-04-26 NOTE — SUBJECTIVE & OBJECTIVE
Interval History: This morning patient continued to have melena. Hemodynamically stable. Appropriate response to blood transfusion yesterday. EGD with small bowel follow through today. Patient mentation greatly improved since admission    Review of Systems   Constitutional:  Negative for activity change, appetite change and fever.   HENT:  Negative for congestion and sore throat.    Respiratory:  Negative for cough, shortness of breath and wheezing.    Cardiovascular:  Negative for chest pain and leg swelling.   Gastrointestinal:  Negative for abdominal pain, constipation, diarrhea, nausea and vomiting.        Melena   Genitourinary:  Negative for difficulty urinating and dysuria.   Musculoskeletal:  Positive for arthralgias and myalgias.   Skin:  Positive for color change and wound. Negative for rash.   Allergic/Immunologic: Negative.    Neurological:  Negative for dizziness and headaches.   Hematological: Negative.    Objective:     Vital Signs (Most Recent):  Temp: 96.8 °F (36 °C) (04/26/23 1119)  Pulse: 71 (04/26/23 1132)  Resp: (!) 25 (04/26/23 1132)  BP: (!) 89/47 (04/26/23 1132)  SpO2: 98 % (04/26/23 1132)   Vital Signs (24h Range):  Temp:  [96.8 °F (36 °C)-99 °F (37.2 °C)] 96.8 °F (36 °C)  Pulse:  [70-86] 71  Resp:  [16-25] 25  SpO2:  [93 %-100 %] 98 %  BP: ()/(46-76) 89/47     Weight: 68 kg (150 lb)  Body mass index is 24.96 kg/m².    Intake/Output Summary (Last 24 hours) at 4/26/2023 1319  Last data filed at 4/26/2023 1108  Gross per 24 hour   Intake 200 ml   Output 805 ml   Net -605 ml        Physical Exam  Vitals and nursing note reviewed.   Constitutional:       General: She is not in acute distress.     Appearance: She is not ill-appearing.   HENT:      Head: Normocephalic and atraumatic.      Mouth/Throat:      Mouth: Mucous membranes are moist.      Pharynx: Oropharynx is clear.   Eyes:      Extraocular Movements: Extraocular movements intact.      Conjunctiva/sclera: Conjunctivae normal.    Cardiovascular:      Rate and Rhythm: Regular rhythm. Tachycardia present.      Pulses: Normal pulses.   Pulmonary:      Effort: Pulmonary effort is normal. No respiratory distress.      Breath sounds: No wheezing or rales.   Abdominal:      Palpations: Abdomen is soft.      Tenderness: There is no abdominal tenderness. There is no guarding.   Genitourinary:     Comments: Black stool on diaper  Musculoskeletal:      Cervical back: Normal range of motion and neck supple.      Right lower leg: No edema.      Left lower leg: No edema.   Skin:     General: Skin is warm.      Findings: Lesion (R heel, L dorsum with darkened skin) present.      Comments: Insicion on right inner thigh- c/d/I- no drainage or erythema seen.  Wound on left thigh- with dressing on top and drain in place with serosanguinous fluid.    Neurological:      General: No focal deficit present.      Mental Status: Mental status is at baseline.   Psychiatric:         Mood and Affect: Mood normal.       Significant Labs: All pertinent labs within the past 24 hours have been reviewed.  CBC:   Recent Labs   Lab 04/25/23  0455 04/25/23  1643 04/26/23  0325   WBC 14.73* 10.42 10.21   HGB 8.2* 8.1* 8.0*   HCT 26.2* 25.4* 25.3*    262 259       CMP:   Recent Labs   Lab 04/25/23  0455 04/26/23  0325    141   K 3.6 3.7    107   CO2 25 24   * 155*   BUN 52* 51*   CREATININE 2.6* 2.7*   CALCIUM 8.9 9.0   PROT 6.6 6.9   ALBUMIN 1.9* 2.0*   BILITOT 1.0 0.4   ALKPHOS 59 63   AST 16 14   ALT 6* 5*   ANIONGAP 8 10         Significant Imaging: I have reviewed all pertinent imaging results/findings within the past 24 hours.

## 2023-04-26 NOTE — [employer] PATIENT INSTRUCTIONS
Discharge Summary/Instructions after an Endoscopic Procedure  Patient Name: Nelsy Marino  Patient MRN: [id number]  Patient YOB: 1940  Wednesday, April 26, 2023  Gee Rodriguez MD  Dear patient,  As a result of recent federal legislation (The Federal Cures Act), you may   receive lab or pathology results from your procedure in your [facility]   account before your physician is able to contact you. Your physician or   their representative will relay the results to you with their   recommendations at their soonest availability.  Thank you,  RESTRICTIONS:  During your procedure today, you received medications for sedation.  These   medications may affect your judgment, balance and coordination.  Therefore,   for 24 hours, you have the following restrictions:   - DO NOT drive a car, operate machinery, make legal/financial decisions,   sign important papers or drink alcohol.    ACTIVITY:  Today: no heavy lifting, straining or running due to procedural   sedation/anesthesia.  The following day: return to full activity including work.  DIET:  Eat and drink normally unless instructed otherwise.     TREATMENT FOR COMMON SIDE EFFECTS:  - Mild abdominal pain, nausea, belching, bloating or excessive gas:  rest,   eat lightly and use a heating pad.  - Sore Throat: treat with throat lozenges and/or gargle with warm salt   water.  - Because air was used during the procedure, expelling large amounts of air   from your rectum or belching is normal.  - If a bowel prep was taken, you may not have a bowel movement for 1-3 days.    This is normal.  SYMPTOMS TO WATCH FOR AND REPORT TO YOUR PHYSICIAN:  1. Abdominal pain or bloating, other than gas cramps.  2. Chest pain.  3. Back pain.  4. Signs of infection such as: chills or fever occurring within 24 hours   after the procedure.  5. Rectal bleeding, which would show as bright red, maroon, or black stools.   (A tablespoon of blood from the rectum is not serious,  especially if   hemorrhoids are present.)  6. Vomiting.  7. Weakness or dizziness.  GO DIRECTLY TO THE NEAREST EMERGENCY ROOM IF YOU HAVE ANY OF THE FOLLOWING:      Difficulty breathing              Chills and/or fever over 101 F   Persistent vomiting and/or vomiting blood   Severe abdominal pain   Severe chest pain   Black, tarry stools   Bleeding- more than one tablespoon   Any other symptom or condition that you feel may need urgent attention  Your doctor recommends these additional instructions:  If any biopsies were taken, your doctors clinic will contact you in 1 to 2   weeks with any results.  - Return patient to hospital [facility] for ongoing care.  Trend Hb &Hct.   - Resume previous diet.   - Return to GI clinic as previously scheduled.  For questions, problems or results please call your physician - Gee Rodriguez MD at Work:  [phone].  [facility], EMERGENCY ROOM PHONE NUMBER: [phone]  IF A COMPLICATION OR EMERGENCY SITUATION ARISES AND YOU ARE UNABLE TO REACH   YOUR PHYSICIAN - GO DIRECTLY TO THE EMERGENCY ROOM.  Gee Rodriguez MD  4/26/2023 12:23:04 PM  This report has been verified and signed electronically.  Dear patient,  As a result of recent federal legislation (The Federal Cures Act), you may   receive lab or pathology results from your procedure in your [facility]   account before your physician is able to contact you. Your physician or   their representative will relay the results to you with their   recommendations at their soonest availability.  Thank you,  [employer]

## 2023-04-26 NOTE — PLAN OF CARE
PPD ordered - CHW completed LOCET - PASRR faxed in to obtain 142 - under review by several SNFs - will continue to follow

## 2023-04-26 NOTE — PROGRESS NOTES
[facility] - [facility] Surg  Nephrology  Progress Note    Patient Name: Nelsy Marino  MRN: [id number]  Admission Date: 4/20/2023  Hospital Length of Stay: 4 days  Attending Provider: Kay Benavidez MD   Primary Care Physician: Juan F Garay  Principal Problem:LOLY (acute kidney injury)    Subjective:     HPI: Ms Marino is an 83yo lady with HTN, DMII, DVT, CHF, PAD, DVT, left to right fem to SFA bypass and right SFA angioplasty with stent placement on 03/2/23, complicated by wound dehiscence of the left groin with graft exposure, and on outpatient cefepime plus micafungin (EOC5/16/23) who was admitted with left foot pain and skin changes concerning for necrosis. This was associated with progressive confusion. She was admitted to  service where she was continued on cefepime and micafungin, later changed to zosyn and micafungin to rule out cefepime toxicity. Confusion has worsened in the hospital since admission 2 days ago with ongoing pain of her feet. Vascular surgery consulted, recommending repeat ARCHIE and toe pressures. Head CT on 4/22/23 negative for acute findings. EEG ordered. Patient transitioned to Zosyn and micafungin. LOLY noted at admission, improved initially with fluids but worsened again on 4/23/23, likely from poor oral intake. Fluids reinitiated and rivas placed, no additional increase in Cr noted and Nephrology was consulted for assessment and management of LOLY on CKD.        Interval History:   Pt was stable overnight but had multiple episodes of dark/lex liquid BMs. She is NPO and awaiting upper endoscopy on 4/156/23. Urine output at 175ml.    Review of patient's allergies indicates:   Allergen Reactions    Motrin [ibuprofen] Itching     Current Facility-Administered Medications   Medication Frequency    acetaminophen tablet 650 mg Q8H PRN    amLODIPine tablet 10 mg Daily    ammonium lactate 12 % lotion Daily    atorvastatin tablet 40 mg QHS    carvediloL tablet 12.5 mg BID    dextrose 10%  bolus 125 mL 125 mL PRN    dextrose 10% bolus 250 mL 250 mL PRN    dextrose 40 % gel 15,000 mg PRN    dextrose 40 % gel 30,000 mg PRN    DULoxetine DR capsule 30 mg BID    glucagon (human recombinant) injection 1 mg PRN    insulin aspart U-100 pen 0-5 Units QID (AC + HS) PRN    micafungin 100 mg in sodium chloride 0.9 % 100 mL IVPB (MB+) Q24H    mupirocin 2 % ointment BID    naloxone 0.4 mg/mL injection 0.02 mg PRN    ondansetron injection 4 mg Q6H PRN    oxyCODONE immediate release tablet 5 mg Q4H PRN    pantoprazole EC tablet 40 mg BID AC    piperacillin-tazobactam (ZOSYN) 4.5 g in dextrose 5 % in water (D5W) 5 % 100 mL IVPB (MB+) Q12H    sodium chloride 0.9% flush 10 mL Q12H PRN       Objective:     Review of Systems   Constitutional:  Positive for malaise/fatigue. Negative for chills and fever.   HENT:  Negative for congestion, hearing loss, sinus pain and sore throat.    Eyes:  Negative for blurred vision, photophobia and redness.   Respiratory:  Negative for cough, shortness of breath and wheezing.    Cardiovascular:  Negative for chest pain, palpitations and leg swelling.   Gastrointestinal:  Negative for blood in stool, constipation, diarrhea, nausea and vomiting.   Genitourinary:  Negative for dysuria and flank pain.   Musculoskeletal:  Positive for myalgias. Negative for falls and joint pain.   Skin:  Negative for itching and rash.   Neurological:  Negative for dizziness, tingling, weakness and headaches.   Psychiatric/Behavioral:  Negative for depression. The patient is not nervous/anxious.        Vital Signs (Most Recent):  Temp: 97.7 °F (36.5 °C) (04/26/23 0913)  Pulse: 80 (04/26/23 0913)  Resp: 16 (04/26/23 0913)  BP: (Abnormal) 188/76 (04/26/23 0913)  SpO2: 96 % (04/26/23 0913)   Vital Signs (24h Range):  Temp:  [97.3 °F (36.3 °C)-99 °F (37.2 °C)] 97.7 °F (36.5 °C)  Pulse:  [76-86] 80  Resp:  [16-20] 16  SpO2:  [93 %-96 %] 96 %  BP: (143-188)/(65-76) 188/76     Weight: 68 kg (150 lb)  (04/20/23 2035)  Body mass index is 24.96 kg/m².  Body surface area is 1.77 meters squared.    I/O last 3 completed shifts:  In: 219 [Blood:219]  Out: 1755 [Urine:1750; Drains:5]    Physical Exam  Vitals and nursing note reviewed.   Constitutional:       General: She is not in acute distress.     Appearance: She is not ill-appearing.   HENT:      Head: Normocephalic and atraumatic.      Right Ear: External ear normal.      Left Ear: External ear normal.      Nose: No congestion.      Mouth/Throat:      Pharynx: Oropharynx is clear.   Eyes:      General:         Right eye: No discharge.         Left eye: No discharge.      Extraocular Movements: Extraocular movements intact.      Conjunctiva/sclera: Conjunctivae normal.   Cardiovascular:      Rate and Rhythm: Normal rate and regular rhythm.      Pulses: Normal pulses.      Heart sounds: Normal heart sounds.   Pulmonary:      Effort: Pulmonary effort is normal. No respiratory distress.      Breath sounds: Normal breath sounds. No wheezing or rales.   Abdominal:      Palpations: Abdomen is soft.      Tenderness: There is no abdominal tenderness. There is no guarding.   Musculoskeletal:      Cervical back: Neck supple. No tenderness.      Right lower leg: No edema.      Left lower leg: No edema.      Comments: Dark skin discoloration over the dorsal aspect of the left foot. Right foot covered with compression dressing.    Skin:     General: Skin is warm.      Findings: Lesion present.   Neurological:      Mental Status: Mental status is at baseline.   Psychiatric:         Attention and Perception: Attention normal.         Mood and Affect: Mood and affect normal.         Speech: Speech normal.         Behavior: Behavior is cooperative.      Comments: Family at bedside: Pt able to answer questions but not always appropriate. No agitation or anxiety.        Significant Labs:  CBC:   Recent Labs   Lab 04/26/23  0325   WBC 10.21   RBC 2.95*   HGB 8.0*   HCT 25.3*       MCV 86   MCH 27.1   MCHC 31.6*     CMP:   Recent Labs   Lab 04/26/23  0325   *   CALCIUM 9.0   ALBUMIN 2.0*   PROT 6.9      K 3.7   CO2 24      BUN 51*   CREATININE 2.7*   ALKPHOS 63   ALT 5*   AST 14   BILITOT 0.4     Recent Labs   Lab 04/24/23  1502   COLORU [place]*   SPECGRAV 1.015   PHUR 6.0   PROTEINUA 2+*   BACTERIA Moderate*   NITRITE Negative   LEUKOCYTESUR 3+*   HYALINECASTS 0     All labs within the past 24 hours have been reviewed.     Significant Imaging:  All image results in the past 24hrs have been reviewed     Assessment/Plan:     Renal/  * LOLY (acute kidney injury)  Ms Marino is a 81yo lady being treated for PAD associated with acute left foot pain and left foot skin changes. 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 Podiatry consulted for foot wounds with no new findings of infection. Pt presented with elevated creatinine concerning for LOLY, baseline previously < 1.0. GFR with Creatinine Clearance at 23% and 2.1mg/dL on 4/20/23 at 2059. 2L of IV LR infused from 0921on 4/21/23 to 0920 on 4/22/23. GFR and Creatinine Clearance increased as expected to 32% and 1.6mg/dL on 4/21/23 at 0400. Paniagua in place to r/o retention complications. No reported dysuria prior to admission and negative for UTI on admission. Retroperitoneal US was negative for hydronephrosis but elevated bilateral resistive indices concerning for medical renal disease. Progressive encephalopathy likely associated with neurotoxicity from cefepime therapy, held on 4/22/23. Switched to Zosyn and Micafungin for continued infectious coverage. ID consulted for continued antibiotic stewardship. Decreased Hgb of 6.6 on 4/22/23 concerning for GIB. One unit of pRBCs added to improve Hgb to 8.2. GFR with Creatinine Clearance at 18.7% and Cr of 2.5mg/dL on 4/24/23 despite fluid resuscitation. Urine analysis with reflex significant for >100 WBCs with moderate bacteria,  cultures negative. Labs elevated for urine protein/creatinine (1.18), urine protein (105), serum protein (5.9) serum kappa/lambda light chains (13.76/8.39). Proteinuria nonspecific and likely secondary to LOLY. Eosinophilia present at 0.6k/ml, sanchez stain ordered to investigate for interstitial nephritis. Multiple loose, lex-colored, BMs noted on 4/25/23 and 4/26/23. Pt taken for EGD with findings of three small angioectasias with bleeding in the gastric body. Bleeding resolved with argon plasma coagulation.      Recommendations:  --Trend CBC and Hgb in setting of GIB   --Transfuse pRBCs for Hgb < 7.0 (<8.0 for active bleed)   --IV fluid resuscitation  --Renal diet  --Monitor I/Os for fluid status  --Trend/correct electrolytes  --Avoid nephrotoxic agents    Thank you for your consult. I will follow-up with patient. Please contact us if you have any additional questions.    Flip Priest MD  Nephrology  [facility] Surg

## 2023-04-26 NOTE — CONSULTS
Patient follow up for wound care to left groin, that was wound vac'd today with wound vac settings @ 125/mmhg. The treatment to left groin to cleanse with normal saline apply urotul ag into wound bed then apply black foam. Wound vac dressing change twice a week and prn for saturation.

## 2023-04-26 NOTE — ASSESSMENT & PLAN NOTE
Concern for cefepime neurotoxicity.   · Transitioned to zosyn 4/22 with continued improvement in mental status.  Back to baseline

## 2023-04-26 NOTE — ASSESSMENT & PLAN NOTE
Patient with acute kidney injury likely due to IVVD/dehydration LOLY is currently worsening. Labs reviewed- Renal function/electrolytes with Estimated Creatinine Clearance: 14.5 mL/min (A) (based on SCr of 2.7 mg/dL (H)). according to latest data. Monitor urine output and serial BMP and adjust therapy as needed. Avoid nephrotoxins and renally dose meds for GFR listed above.     Cr 2.1 (baseline 0.8-1.5).  Patient dry on physical exam.  Poor oral intake, thought to be prerenal in origin initially. Possible due to acute on chronic anemia.    Recent Labs   Lab 04/25/23  0455 04/26/23  0325    141   K 3.6 3.7    107   CO2 25 24   BUN 52* 51*   CREATININE 2.6* 2.7*     - Nephrology consulted; appreciate assistance   - Paniagua placed after patient retaining on bladder scan 4/23 with hematuria 4/24  - RP u/s no hydronephrosis  - Urine lytes showed possible intrinsic etiology  - CMP qd, trend Cr  - strict I/O  - daily weights  - renally dose all medications  - avoid nephrotoxic agents, NSAIDs, IV contrast, ACE/ARB

## 2023-04-26 NOTE — TRANSFER OF CARE
"Anesthesia Transfer of Care Note    Patient: Nelsy Marino    Procedure(s) Performed: Procedure(s) (LRB):  EGD (ESOPHAGOGASTRODUODENOSCOPY) (N/A)    Patient location: PACU    Anesthesia Type: general    Transport from OR: Transported from OR on 6-10 L/min O2 by face mask with adequate spontaneous ventilation    Post pain: adequate analgesia    Post assessment: no apparent anesthetic complications    Post vital signs: stable    Level of consciousness: awake    Nausea/Vomiting: no nausea/vomiting    Complications: none    Transfer of care protocol was followed      Last vitals:   Visit Vitals  BP (!) 84/46 (BP Location: Left arm, Patient Position: Lying)   Pulse 70   Temp 36 °C (96.8 °F) (Temporal)   Resp 18   Ht 5' 5" (1.651 m)   Wt 68 kg (150 lb)   LMP  (LMP Unknown)   SpO2 100%   Breastfeeding No   BMI 24.96 kg/m²     "

## 2023-04-26 NOTE — SUBJECTIVE & OBJECTIVE
Interval History:   Pt was stable overnight but had multiple episodes of dark/lex liquid BMs. She is NPO and awaiting upper endoscopy on 4/156/23. Urine output at 175ml.    Review of patient's allergies indicates:   Allergen Reactions    Motrin [ibuprofen] Itching     Current Facility-Administered Medications   Medication Frequency    acetaminophen tablet 650 mg Q8H PRN    amLODIPine tablet 10 mg Daily    ammonium lactate 12 % lotion Daily    atorvastatin tablet 40 mg QHS    carvediloL tablet 12.5 mg BID    dextrose 10% bolus 125 mL 125 mL PRN    dextrose 10% bolus 250 mL 250 mL PRN    dextrose 40 % gel 15,000 mg PRN    dextrose 40 % gel 30,000 mg PRN    DULoxetine DR capsule 30 mg BID    glucagon (human recombinant) injection 1 mg PRN    insulin aspart U-100 pen 0-5 Units QID (AC + HS) PRN    micafungin 100 mg in sodium chloride 0.9 % 100 mL IVPB (MB+) Q24H    mupirocin 2 % ointment BID    naloxone 0.4 mg/mL injection 0.02 mg PRN    ondansetron injection 4 mg Q6H PRN    oxyCODONE immediate release tablet 5 mg Q4H PRN    pantoprazole EC tablet 40 mg BID AC    piperacillin-tazobactam (ZOSYN) 4.5 g in dextrose 5 % in water (D5W) 5 % 100 mL IVPB (MB+) Q12H    sodium chloride 0.9% flush 10 mL Q12H PRN       Objective:     Review of Systems   Constitutional:  Positive for malaise/fatigue. Negative for chills and fever.   HENT:  Negative for congestion, hearing loss, sinus pain and sore throat.    Eyes:  Negative for blurred vision, photophobia and redness.   Respiratory:  Negative for cough, shortness of breath and wheezing.    Cardiovascular:  Negative for chest pain, palpitations and leg swelling.   Gastrointestinal:  Negative for blood in stool, constipation, diarrhea, nausea and vomiting.   Genitourinary:  Negative for dysuria and flank pain.   Musculoskeletal:  Positive for myalgias. Negative for falls and joint pain.   Skin:  Negative for itching and rash.   Neurological:  Negative for dizziness, tingling,  weakness and headaches.   Psychiatric/Behavioral:  Negative for depression. The patient is not nervous/anxious.        Vital Signs (Most Recent):  Temp: 97.7 °F (36.5 °C) (04/26/23 0913)  Pulse: 80 (04/26/23 0913)  Resp: 16 (04/26/23 0913)  BP: (Abnormal) 188/76 (04/26/23 0913)  SpO2: 96 % (04/26/23 0913)   Vital Signs (24h Range):  Temp:  [97.3 °F (36.3 °C)-99 °F (37.2 °C)] 97.7 °F (36.5 °C)  Pulse:  [76-86] 80  Resp:  [16-20] 16  SpO2:  [93 %-96 %] 96 %  BP: (143-188)/(65-76) 188/76     Weight: 68 kg (150 lb) (04/20/23 2035)  Body mass index is 24.96 kg/m².  Body surface area is 1.77 meters squared.    I/O last 3 completed shifts:  In: 219 [Blood:219]  Out: 1755 [Urine:1750; Drains:5]    Physical Exam  Vitals and nursing note reviewed.   Constitutional:       General: She is not in acute distress.     Appearance: She is not ill-appearing.   HENT:      Head: Normocephalic and atraumatic.      Right Ear: External ear normal.      Left Ear: External ear normal.      Nose: No congestion.      Mouth/Throat:      Pharynx: Oropharynx is clear.   Eyes:      General:         Right eye: No discharge.         Left eye: No discharge.      Extraocular Movements: Extraocular movements intact.      Conjunctiva/sclera: Conjunctivae normal.   Cardiovascular:      Rate and Rhythm: Normal rate and regular rhythm.      Pulses: Normal pulses.      Heart sounds: Normal heart sounds.   Pulmonary:      Effort: Pulmonary effort is normal. No respiratory distress.      Breath sounds: Normal breath sounds. No wheezing or rales.   Abdominal:      Palpations: Abdomen is soft.      Tenderness: There is no abdominal tenderness. There is no guarding.   Musculoskeletal:      Cervical back: Neck supple. No tenderness.      Right lower leg: No edema.      Left lower leg: No edema.      Comments: Dark skin discoloration over the dorsal aspect of the left foot. Right foot covered with compression dressing.    Skin:     General: Skin is warm.       Findings: Lesion present.   Neurological:      Mental Status: Mental status is at baseline.   Psychiatric:         Attention and Perception: Attention normal.         Mood and Affect: Mood and affect normal.         Speech: Speech normal.         Behavior: Behavior is cooperative.      Comments: Family at bedside: Pt able to answer questions but not always appropriate. No agitation or anxiety.        Significant Labs:  CBC:   Recent Labs   Lab 04/26/23  0325   WBC 10.21   RBC 2.95*   HGB 8.0*   HCT 25.3*      MCV 86   MCH 27.1   MCHC 31.6*     CMP:   Recent Labs   Lab 04/26/23  0325   *   CALCIUM 9.0   ALBUMIN 2.0*   PROT 6.9      K 3.7   CO2 24      BUN 51*   CREATININE 2.7*   ALKPHOS 63   ALT 5*   AST 14   BILITOT 0.4     Recent Labs   Lab 04/24/23  1502   COLORU [place]*   SPECGRAV 1.015   PHUR 6.0   PROTEINUA 2+*   BACTERIA Moderate*   NITRITE Negative   LEUKOCYTESUR 3+*   HYALINECASTS 0     All labs within the past 24 hours have been reviewed.     Significant Imaging:  All image results in the past 24hrs have been reviewed

## 2023-04-26 NOTE — PLAN OF CARE
Problem: Adult Inpatient Plan of Care  Goal: Plan of Care Review  Outcome: Ongoing, Progressing  Goal: Patient-Specific Goal (Individualized)  Outcome: Ongoing, Progressing  Goal: Absence of Hospital-Acquired Illness or Injury  Outcome: Ongoing, Progressing  Goal: Optimal Comfort and Wellbeing  Outcome: Ongoing, Progressing     Problem: Adult Inpatient Plan of Care  Goal: Plan of Care Review  Outcome: Ongoing, Progressing     Problem: Adult Inpatient Plan of Care  Goal: Patient-Specific Goal (Individualized)  Outcome: Ongoing, Progressing     Problem: Adult Inpatient Plan of Care  Goal: Absence of Hospital-Acquired Illness or Injury  Outcome: Ongoing, Progressing     Problem: Diabetes Comorbidity  Goal: Blood Glucose Level Within Targeted Range  Outcome: Ongoing, Progressing     Problem: Bleeding (Sepsis/Septic Shock)  Goal: Absence of Bleeding  Outcome: Ongoing, Progressing

## 2023-04-26 NOTE — PT/OT/SLP PROGRESS
Occupational Therapy      Patient Name:  Nelsy Marino   MRN:  [id number]    Patient not seen today secondary to Off the floor for procedure/surgery. Will follow-up tomorrow morning.    CHELSI Lutz  4/26/2023  Pager: [phone]

## 2023-04-26 NOTE — ASSESSMENT & PLAN NOTE
Baseline 8.6-11.   Initially suspected foot wounds, patient also has leg muscle flap with drain vs hematuria vs melena  EGD small bowel follow through 4/26 with red blood in the gastric body and three bleeding angioectasias in the stomach treated with APC    - CBC qd, trend   - transfuse pRBC for Hb < 7 g/dL; transfuse for < 8 if ACS or active bleeding and unresponsive to volume resuscitation with evidence of end-organ ischemia/>20% blood volume loss with HD instablity/>30% blood volume loss

## 2023-04-26 NOTE — PROGRESS NOTES
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 Albicans, C. Glabrata, and prevotella) on 6 week course of outpatient cefepime and micafungin (EOC 5/16/23), admitted with left foot pain and skin changes concerning for necrosis, LOLY,  and encephalopathy possibly 2/2 cefepime neurotoxicity.       Transitioned to zosyn in lieu of cefepime on 4/22 with improvement in mental status  Continued worsening renal function despite fluids. Apniagua placed for urinary retention  U/A concerning for UTI, cx pending.  Denies dysuria.  Renal US negative for hydronephrosis  Nephrology following. Blood cultures NGTD.       Melanotic stools today - reports diarrhea X 5 today, though patient reports she received something for constipation?  Blood transfusion overnight.  EGD tomorrow.  Mild leukocytosis this morning ( ? 2/2 unit blood), resolved this afternoon. Mild eosinophilia noted      Continue Zosyn IV renally dosed   Continue IV micafungin.   Will add on Hall's stain    Will follow up tomorrow    Neuro  Acute encephalopathy  Concern for cefepime neurotoxicity.  Transitioned to zosyn 4/22 with continued improvement in mental status.  Back to  "baseline    Orthopedic  Wound infection    Due to wound dehiscence during prior admission.  · Antimicrobial therapy as above.  · Follow up with Plastic Surgery as outpatient per their last recommendations of 4/9/23    S/P transmetatarsal amputation of foot, right     S/p TMA 3/4/23 with wound dehiscence 3/10, cx + for Pseudomonas, s/p 14 days of oral cipro, noted to  have ischemic changes to right TMA site and left forefoot.  Podiatry has been following.  No clinical signs of infection.  No further Vascular intervention planned.       -  Continue Wound Care as recommended by Podiatry      Data reviewed and plan discussed with ID staff, Dr. Austin       Thank you.   Please Secure Chat for any questions or concerns.  MABEL Banuelos, ANP-C      Subjective:     Principal Problem:Vascular inflammation or infection    HPI: An 82-year-old woman with HTN, DMII, DVT, CHF, PAD, DVT, left to right fem to SFA bypass and right SFA angioplasty with stent placement on 03/2/23, complicated by wound dehiscence of the left groin with graft exposure, and on outpatient cefepime plus micafungin (EOC5/16/23) who was admitted with left foot pain and skin changes concerning for necrosis. This was associated with progressive confusion. She was admitted to  service and continued on cefepime and micafungin. Her  reports confusion was worsened in the hospital since admission 2 days ago. He reports she has ongoing pain of her feet.    Infectious Diseases consulted for "Micafungin"        Interval History:   Mental status remains much improved.  Family at bedside today  WBC 14 today  Unit of blood overnight.   Melanotic stools. GI Consulted. EGD tomorrow   Primary complaint is left foot pain    Review of Systems   Constitutional:  Positive for activity change. Negative for appetite change, chills and fever.   Respiratory:  Negative for cough and shortness of breath.    Cardiovascular:  Negative for chest pain.   Gastrointestinal:  " Positive for blood in stool and diarrhea. Negative for abdominal pain, constipation, nausea and vomiting.   Genitourinary:  Positive for difficulty urinating.   Musculoskeletal:  Positive for arthralgias and gait problem.   Skin:  Positive for wound.   Neurological:  Negative for dizziness, weakness and headaches.   Psychiatric/Behavioral:  Negative for confusion. The patient is not nervous/anxious.    Objective:     Vital Signs (Most Recent):  Temp: 99 °F (37.2 °C) (04/25/23 1549)  Pulse: 79 (04/25/23 1523)  Resp: 18 (04/25/23 1052)  BP: (!) 143/75 (04/25/23 0900)  SpO2: 96 % (04/25/23 0328) Vital Signs (24h Range):  Temp:  [97.6 °F (36.4 °C)-99 °F (37.2 °C)] 99 °F (37.2 °C)  Pulse:  [67-87] 79  Resp:  [15-19] 18  SpO2:  [95 %-98 %] 96 %  BP: (131-156)/(62-75) 143/75     Weight: 68 kg (150 lb)  Body mass index is 24.96 kg/m².    Estimated Creatinine Clearance: 15 mL/min (A) (based on SCr of 2.6 mg/dL (H)).    Physical Exam  Vitals and nursing note reviewed.   Constitutional:       General: She is not in acute distress.     Appearance: She is well-developed. She is not toxic-appearing or diaphoretic.   HENT:      Head: Normocephalic and atraumatic.      Mouth/Throat:      Mouth: Mucous membranes are moist.   Eyes:      General: No scleral icterus.     Conjunctiva/sclera: Conjunctivae normal.   Cardiovascular:      Rate and Rhythm: Normal rate and regular rhythm.      Heart sounds: Normal heart sounds.   Pulmonary:      Effort: Pulmonary effort is normal. No respiratory distress.      Breath sounds: No wheezing.   Abdominal:      General: Bowel sounds are normal. There is no distension.      Palpations: Abdomen is soft.      Tenderness: There is no abdominal tenderness.   Genitourinary:     Comments: Paniagua - urine dark, hematuria  Musculoskeletal:         General: No tenderness. Normal range of motion.   Skin:     General: Skin is warm and dry.      Comments: Dark skin discoloration over the dorsal aspect of the left  foot.    Right foot TMA site c/d/i  Podiatry photos reviewed.  See below    Left groin dressing c/d/I.  Drain in place.  Staples in place   Neurological:      Mental Status: She is alert and oriented to person, place, and time.      Comments: Alert, oriented X 3. Answers all questions appropriately   Psychiatric:         Mood and Affect: Mood normal.         Behavior: Behavior normal.             Significant Labs: Blood Culture:   Recent Labs   Lab 03/08/23  2325 03/10/23  0942 03/10/23  0943 03/29/23 2009 04/20/23 2057   LABBLOO No growth after 5 days.  No growth after 5 days. No growth after 5 days. No growth after 5 days. No growth after 5 days.  No growth after 5 days. No Growth to date  No Growth to date  No Growth to date  No Growth to date  No Growth to date  No Growth to date  No Growth to date  No Growth to date  No Growth to date  No Growth to date       BMP:   Recent Labs   Lab 04/25/23  0455   *      K 3.6      CO2 25   BUN 52*   CREATININE 2.6*   CALCIUM 8.9   MG 1.5*       CBC:   Recent Labs   Lab 04/24/23  0548 04/24/23  1956 04/25/23  0455   WBC 9.95 10.87 14.73*   HGB 7.4* 6.6* 8.2*   HCT 24.1* 21.5* 26.2*    281 269       Urine Culture: No results for input(s): LABURIN in the last 4320 hours.  Urine Studies:   Recent Labs   Lab 04/23/23  1116 04/24/23  1502   COLORU Straw  Straw [place]*   APPEARANCEUA Cloudy*  Cloudy* Ex.Turbid   PHUR 6.0  6.0 6.0   SPECGRAV 1.015  1.015 1.015   PROTEINUA 1+*  1+* 2+*   GLUCUA Negative  Negative Negative   KETONESU Trace*  Trace* Trace*   BILIRUBINUA Negative  Negative Negative   OCCULTUA 1+*  1+* 3+*   NITRITE Negative  Negative Negative   LEUKOCYTESUR Negative  Negative 3+*   RBCUA 4 >100*   WBCUA 0 >100*   BACTERIA Rare Moderate*   SQUAMEPITHEL 1  --    HYALINECASTS 0 0       Wound Culture:   Recent Labs   Lab 03/04/23  1141 03/10/23  1340 03/31/23  0805 03/31/23  0809   LABAERO No growth PSEUDOMONAS FLUORESCENS  GROUP  Few  * FELI ALBICANS  Rare  * FELI GLABRATA  Rare  *         Significant Imaging: I have reviewed all pertinent imaging results/findings within the past 24 hours.

## 2023-04-26 NOTE — NURSING
Pt is AAOx4, VSS, on RA. Has c/o pain, medicated per MAR.     Is having frequent black, tarry BM and the urine is a red/pink color. Medical team is aware of these findings.     Pt and family are aware pt is going to be NPO at [facility] for procedure tomorrow.

## 2023-04-27 LAB
1,25(OH)2D3 SERPL-MCNC: 14 PG/ML (ref 20–79)
A-TOCOPHEROL VIT E SERPL-MCNC: 540 UG/DL (ref 500–1800)
ALBUMIN SERPL BCP-MCNC: 1.9 G/DL (ref 3.5–5.2)
ALP SERPL-CCNC: 58 U/L (ref 55–135)
ALT SERPL W/O P-5'-P-CCNC: <5 U/L (ref 10–44)
ANION GAP SERPL CALC-SCNC: 11 MMOL/L (ref 8–16)
AST SERPL-CCNC: 13 U/L (ref 10–40)
BASOPHILS # BLD AUTO: 0.02 K/UL (ref 0–0.2)
BASOPHILS # BLD AUTO: 0.02 K/UL (ref 0–0.2)
BASOPHILS NFR BLD: 0.1 % (ref 0–1.9)
BASOPHILS NFR BLD: 0.1 % (ref 0–1.9)
BILIRUB SERPL-MCNC: 0.4 MG/DL (ref 0.1–1)
BUN SERPL-MCNC: 43 MG/DL (ref 8–23)
CALCIUM SERPL-MCNC: 8.9 MG/DL (ref 8.7–10.5)
CHLORIDE SERPL-SCNC: 109 MMOL/L (ref 95–110)
CO2 SERPL-SCNC: 24 MMOL/L (ref 23–29)
CREAT SERPL-MCNC: 2.4 MG/DL (ref 0.5–1.4)
DIFFERENTIAL METHOD: ABNORMAL
DIFFERENTIAL METHOD: ABNORMAL
EOSINOPHIL # BLD AUTO: 0.2 K/UL (ref 0–0.5)
EOSINOPHIL # BLD AUTO: 0.5 K/UL (ref 0–0.5)
EOSINOPHIL NFR BLD: 1.2 % (ref 0–8)
EOSINOPHIL NFR BLD: 3.1 % (ref 0–8)
EOSINOPHIL URNS QL WRIGHT STN: NORMAL
ERYTHROCYTE [DISTWIDTH] IN BLOOD BY AUTOMATED COUNT: 14.9 % (ref 11.5–14.5)
ERYTHROCYTE [DISTWIDTH] IN BLOOD BY AUTOMATED COUNT: 15 % (ref 11.5–14.5)
EST. GFR  (NO RACE VARIABLE): 19.7 ML/MIN/1.73 M^2
GLUCOSE SERPL-MCNC: 139 MG/DL (ref 70–110)
HCT VFR BLD AUTO: 23.3 % (ref 37–48.5)
HCT VFR BLD AUTO: 23.3 % (ref 37–48.5)
HGB BLD-MCNC: 7.1 G/DL (ref 12–16)
HGB BLD-MCNC: 7.2 G/DL (ref 12–16)
IGA SERPL-MCNC: 799 MG/DL (ref 40–350)
IGG SERPL-MCNC: 1397 MG/DL (ref 650–1600)
IGM SERPL-MCNC: 36 MG/DL (ref 50–300)
IMM GRANULOCYTES # BLD AUTO: 0.04 K/UL (ref 0–0.04)
IMM GRANULOCYTES # BLD AUTO: 0.05 K/UL (ref 0–0.04)
IMM GRANULOCYTES NFR BLD AUTO: 0.3 % (ref 0–0.5)
IMM GRANULOCYTES NFR BLD AUTO: 0.3 % (ref 0–0.5)
LYMPHOCYTES # BLD AUTO: 1.3 K/UL (ref 1–4.8)
LYMPHOCYTES # BLD AUTO: 1.8 K/UL (ref 1–4.8)
LYMPHOCYTES NFR BLD: 12 % (ref 18–48)
LYMPHOCYTES NFR BLD: 8.1 % (ref 18–48)
MAGNESIUM SERPL-MCNC: 1.6 MG/DL (ref 1.6–2.6)
MCH RBC QN AUTO: 26.5 PG (ref 27–31)
MCH RBC QN AUTO: 27 PG (ref 27–31)
MCHC RBC AUTO-ENTMCNC: 30.5 G/DL (ref 32–36)
MCHC RBC AUTO-ENTMCNC: 30.9 G/DL (ref 32–36)
MCV RBC AUTO: 87 FL (ref 82–98)
MCV RBC AUTO: 87 FL (ref 82–98)
MONOCYTES # BLD AUTO: 0.4 K/UL (ref 0.3–1)
MONOCYTES # BLD AUTO: 0.5 K/UL (ref 0.3–1)
MONOCYTES NFR BLD: 2.6 % (ref 4–15)
MONOCYTES NFR BLD: 3.3 % (ref 4–15)
NEUTROPHILS # BLD AUTO: 12.2 K/UL (ref 1.8–7.7)
NEUTROPHILS # BLD AUTO: 14.3 K/UL (ref 1.8–7.7)
NEUTROPHILS NFR BLD: 81.2 % (ref 38–73)
NEUTROPHILS NFR BLD: 87.7 % (ref 38–73)
NRBC BLD-RTO: 0 /100 WBC
NRBC BLD-RTO: 0 /100 WBC
PHOSPHATE SERPL-MCNC: 3.5 MG/DL (ref 2.7–4.5)
PLATELET # BLD AUTO: 223 K/UL (ref 150–450)
PLATELET # BLD AUTO: 228 K/UL (ref 150–450)
PMV BLD AUTO: 11.2 FL (ref 9.2–12.9)
PMV BLD AUTO: 11.6 FL (ref 9.2–12.9)
POCT GLUCOSE: 157 MG/DL (ref 70–110)
POCT GLUCOSE: 188 MG/DL (ref 70–110)
POCT GLUCOSE: 189 MG/DL (ref 70–110)
POCT GLUCOSE: 197 MG/DL (ref 70–110)
POTASSIUM SERPL-SCNC: 3.6 MMOL/L (ref 3.5–5.1)
PROT SERPL-MCNC: 6.6 G/DL (ref 6–8.4)
RBC # BLD AUTO: 2.67 M/UL (ref 4–5.4)
RBC # BLD AUTO: 2.68 M/UL (ref 4–5.4)
SODIUM SERPL-SCNC: 144 MMOL/L (ref 136–145)
VIT B1 BLD-MCNC: 34 UG/L (ref 38–122)
WBC # BLD AUTO: 15.04 K/UL (ref 3.9–12.7)
WBC # BLD AUTO: 16.3 K/UL (ref 3.9–12.7)

## 2023-04-27 PROCEDURE — 99233 SBSQ HOSP IP/OBS HIGH 50: CPT | Mod: GC,,, | Performed by: STUDENT IN AN ORGANIZED HEALTH CARE EDUCATION/TRAINING PROGRAM

## 2023-04-27 PROCEDURE — 36415 COLL VENOUS BLD VENIPUNCTURE: CPT

## 2023-04-27 PROCEDURE — 99232 SBSQ HOSP IP/OBS MODERATE 35: CPT | Mod: ,,, | Performed by: INTERNAL MEDICINE

## 2023-04-27 PROCEDURE — 80053 COMPREHEN METABOLIC PANEL: CPT

## 2023-04-27 PROCEDURE — 85025 COMPLETE CBC W/AUTO DIFF WBC: CPT

## 2023-04-27 PROCEDURE — 27000221 HC OXYGEN, UP TO 24 HOURS

## 2023-04-27 PROCEDURE — 63600175 PHARM REV CODE 636 W HCPCS

## 2023-04-27 PROCEDURE — 93925 LOWER EXTREMITY STUDY: CPT | Performed by: SURGERY

## 2023-04-27 PROCEDURE — 25000003 PHARM REV CODE 250

## 2023-04-27 PROCEDURE — 99232 PR SUBSEQUENT HOSPITAL CARE,LEVL II: ICD-10-PCS | Mod: ,,, | Performed by: INTERNAL MEDICINE

## 2023-04-27 PROCEDURE — 11000001 HC ACUTE MED/SURG PRIVATE ROOM

## 2023-04-27 PROCEDURE — 99233 PR SUBSEQUENT HOSPITAL CARE,LEVL III: ICD-10-PCS | Mod: GC,,, | Performed by: STUDENT IN AN ORGANIZED HEALTH CARE EDUCATION/TRAINING PROGRAM

## 2023-04-27 PROCEDURE — 94761 N-INVAS EAR/PLS OXIMETRY MLT: CPT

## 2023-04-27 PROCEDURE — 25000003 PHARM REV CODE 250: Performed by: INTERNAL MEDICINE

## 2023-04-27 PROCEDURE — 83735 ASSAY OF MAGNESIUM: CPT

## 2023-04-27 PROCEDURE — 51798 US URINE CAPACITY MEASURE: CPT

## 2023-04-27 PROCEDURE — 63600175 PHARM REV CODE 636 W HCPCS: Performed by: INTERNAL MEDICINE

## 2023-04-27 PROCEDURE — 85025 COMPLETE CBC W/AUTO DIFF WBC: CPT | Mod: 91

## 2023-04-27 PROCEDURE — 84100 ASSAY OF PHOSPHORUS: CPT

## 2023-04-27 PROCEDURE — 99900035 HC TECH TIME PER 15 MIN (STAT)

## 2023-04-27 PROCEDURE — 87205 SMEAR GRAM STAIN: CPT | Performed by: NURSE PRACTITIONER

## 2023-04-27 PROCEDURE — 82784 ASSAY IGA/IGD/IGG/IGM EACH: CPT

## 2023-04-27 PROCEDURE — 92526 ORAL FUNCTION THERAPY: CPT

## 2023-04-27 PROCEDURE — 25000003 PHARM REV CODE 250: Performed by: STUDENT IN AN ORGANIZED HEALTH CARE EDUCATION/TRAINING PROGRAM

## 2023-04-27 RX ORDER — CEPHALEXIN 500 MG/1
1000 CAPSULE ORAL EVERY 12 HOURS
Qty: 60 CAPSULE | Refills: 0
Start: 2023-05-18 | End: 2023-04-29 | Stop reason: HOSPADM

## 2023-04-27 RX ORDER — LOPERAMIDE HYDROCHLORIDE 2 MG/1
2 CAPSULE ORAL 4 TIMES DAILY PRN
Status: DISCONTINUED | OUTPATIENT
Start: 2023-04-27 | End: 2023-05-05 | Stop reason: HOSPADM

## 2023-04-27 RX ORDER — INFANT FORMULA WITH IRON
POWDER (GRAM) ORAL
Status: DISCONTINUED | OUTPATIENT
Start: 2023-04-27 | End: 2023-05-05 | Stop reason: HOSPADM

## 2023-04-27 RX ADMIN — CARVEDILOL 12.5 MG: 12.5 TABLET, FILM COATED ORAL at 09:04

## 2023-04-27 RX ADMIN — PANTOPRAZOLE SODIUM 40 MG: 40 TABLET, DELAYED RELEASE ORAL at 03:04

## 2023-04-27 RX ADMIN — OXYCODONE HYDROCHLORIDE 5 MG: 5 TABLET ORAL at 01:04

## 2023-04-27 RX ADMIN — PIPERACILLIN SODIUM AND TAZOBACTAM SODIUM 4.5 G: 4; .5 INJECTION, POWDER, FOR SOLUTION INTRAVENOUS at 03:04

## 2023-04-27 RX ADMIN — MUPIROCIN: 20 OINTMENT TOPICAL at 09:04

## 2023-04-27 RX ADMIN — DULOXETINE 30 MG: 30 CAPSULE, DELAYED RELEASE ORAL at 09:04

## 2023-04-27 RX ADMIN — LOPERAMIDE HYDROCHLORIDE 2 MG: 2 CAPSULE ORAL at 12:04

## 2023-04-27 RX ADMIN — CARVEDILOL 12.5 MG: 12.5 TABLET, FILM COATED ORAL at 08:04

## 2023-04-27 RX ADMIN — MICAFUNGIN SODIUM 100 MG: 100 INJECTION, POWDER, LYOPHILIZED, FOR SOLUTION INTRAVENOUS at 08:04

## 2023-04-27 RX ADMIN — Medication: at 08:04

## 2023-04-27 RX ADMIN — DULOXETINE 30 MG: 30 CAPSULE, DELAYED RELEASE ORAL at 08:04

## 2023-04-27 RX ADMIN — PANTOPRAZOLE SODIUM 40 MG: 40 TABLET, DELAYED RELEASE ORAL at 06:04

## 2023-04-27 RX ADMIN — AMLODIPINE BESYLATE 10 MG: 10 TABLET ORAL at 08:04

## 2023-04-27 RX ADMIN — ATORVASTATIN CALCIUM 40 MG: 40 TABLET, FILM COATED ORAL at 09:04

## 2023-04-27 RX ADMIN — OXYCODONE HYDROCHLORIDE 5 MG: 5 TABLET ORAL at 08:04

## 2023-04-27 NOTE — ASSESSMENT & PLAN NOTE
S/p TMA 3/4/23 with wound dehiscence 3/10, cx + for Pseudomonas, s/p 14 days of oral cipro, noted to  have ischemic changes to right TMA site and left forefoot.  Podiatry has been following.  No clinical signs of infection.  Vascular following.  No further Vascular intervention planned.       -  Continue Wound Care as recommended by Podiatry

## 2023-04-27 NOTE — SUBJECTIVE & OBJECTIVE
Interval History:   Mental status remains much improved.    Continues to have melena  EGD today showed bleeding angioectasias treated with APC.   Creatinine 2.7 (up from 2.6)  Urine cx negative.     Review of Systems   Constitutional:  Positive for activity change. Negative for appetite change, chills and fever.   Respiratory:  Negative for cough and shortness of breath.    Cardiovascular:  Negative for chest pain.   Gastrointestinal:  Positive for blood in stool and diarrhea. Negative for abdominal pain, constipation, nausea and vomiting.   Genitourinary:  Positive for difficulty urinating.   Musculoskeletal:  Positive for arthralgias and gait problem.   Skin:  Positive for wound.   Neurological:  Negative for dizziness, weakness and headaches.   Psychiatric/Behavioral:  Negative for confusion. The patient is not nervous/anxious.    Objective:     Vital Signs (Most Recent):  Temp: 98.5 °F (36.9 °C) (04/26/23 1934)  Pulse: 76 (04/26/23 1454)  Resp: (!) 25 (04/26/23 1132)  BP: (!) 160/71 (04/26/23 1454)  SpO2: 98 % (04/26/23 1132) Vital Signs (24h Range):  Temp:  [96.8 °F (36 °C)-98.5 °F (36.9 °C)] 98.5 °F (36.9 °C)  Pulse:  [70-86] 76  Resp:  [16-25] 25  SpO2:  [93 %-100 %] 98 %  BP: ()/(46-76) 160/71     Weight: 68 kg (150 lb)  Body mass index is 24.96 kg/m².    Estimated Creatinine Clearance: 14.5 mL/min (A) (based on SCr of 2.7 mg/dL (H)).    Physical Exam  Vitals and nursing note reviewed.   Constitutional:       General: She is not in acute distress.     Appearance: She is well-developed. She is not toxic-appearing or diaphoretic.   HENT:      Head: Normocephalic and atraumatic.      Mouth/Throat:      Mouth: Mucous membranes are moist.   Eyes:      General: No scleral icterus.     Conjunctiva/sclera: Conjunctivae normal.   Cardiovascular:      Rate and Rhythm: Normal rate and regular rhythm.   Pulmonary:      Effort: Pulmonary effort is normal. No respiratory distress.   Abdominal:      General: Bowel  sounds are normal. There is no distension.      Palpations: Abdomen is soft.      Tenderness: There is no abdominal tenderness.   Genitourinary:     Comments: Paniagua - urine dark, hematuria  Musculoskeletal:         General: No tenderness. Normal range of motion.      Right lower leg: No edema.      Left lower leg: No edema.   Skin:     General: Skin is warm and dry.      Comments: Dark skin discoloration over the dorsal aspect of the left foot.    Right foot TMA site c/d/i  Podiatry photos reviewed.  See below    Left groin dressing not examined today - Wound Care applying wound vac   Neurological:      Mental Status: She is alert and oriented to person, place, and time.      Comments: Alert, oriented X 3. Answers all questions appropriately   Psychiatric:         Mood and Affect: Mood normal.         Behavior: Behavior normal.             Significant Labs: Blood Culture:   Recent Labs   Lab 03/08/23  2325 03/10/23  0942 03/10/23  0943 03/29/23 2009 04/20/23  2057   LABBLOO No growth after 5 days.  No growth after 5 days. No growth after 5 days. No growth after 5 days. No growth after 5 days.  No growth after 5 days. No growth after 5 days.  No growth after 5 days.       BMP:   Recent Labs   Lab 04/26/23  0325   *      K 3.7      CO2 24   BUN 51*   CREATININE 2.7*   CALCIUM 9.0   MG 1.9       CBC:   Recent Labs   Lab 04/25/23  0455 04/25/23  1643 04/26/23  0325   WBC 14.73* 10.42 10.21   HGB 8.2* 8.1* 8.0*   HCT 26.2* 25.4* 25.3*    262 259       Urine Culture:   Recent Labs   Lab 04/24/23  1502   LABURIN No growth     Urine Studies:   Recent Labs   Lab 04/23/23  1116 04/24/23  1502   COLORU Straw  Straw [employer]*   APPEARANCEUA Cloudy*  Cloudy* Ex.Turbid   PHUR 6.0  6.0 6.0   SPECGRAV 1.015  1.015 1.015   PROTEINUA 1+*  1+* 2+*   GLUCUA Negative  Negative Negative   KETONESU Trace*  Trace* Trace*   BILIRUBINUA Negative  Negative Negative   OCCULTUA 1+*  1+* 3+*   NITRITE  Negative  Negative Negative   LEUKOCYTESUR Negative  Negative 3+*   RBCUA 4 >100*   WBCUA 0 >100*   BACTERIA Rare Moderate*   SQUAMEPITHEL 1  --    HYALINECASTS 0 0       Wound Culture:   Recent Labs   Lab 03/04/23  1141 03/10/23  1340 03/31/23  0805 03/31/23  0809   LABAERO No growth PSEUDOMONAS FLUORESCENS GROUP  Few  * FELI ALBICANS  Rare  * FELI GLABRATA  Rare  *         Significant Imaging: I have reviewed all pertinent imaging results/findings within the past 24 hours.

## 2023-04-27 NOTE — PROGRESS NOTES
[facility]  Nephrology  Progress Note    Patient Name: Nelsy Marino  MRN: [id number]  Admission Date: 4/20/2023  Hospital Length of Stay: 5 days  Attending Provider: Shadi Vidal MD   Primary Care Physician: Juan F Garay  Principal Problem:LOLY (acute kidney injury)    Subjective:     HPI: Ms Marino is an 81yo lady with HTN, DMII, DVT, CHF, PAD, DVT, left to right fem to SFA bypass and right SFA angioplasty with stent placement on 03/2/23, complicated by wound dehiscence of the left groin with graft exposure, and on outpatient cefepime plus micafungin (EOC5/16/23) who was admitted with left foot pain and skin changes concerning for necrosis. This was associated with progressive confusion. She was admitted to  service where she was continued on cefepime and micafungin, later changed to zosyn and micafungin to rule out cefepime toxicity. Confusion has worsened in the hospital since admission 2 days ago with ongoing pain of her feet. Vascular surgery consulted, recommending repeat ARCHIE and toe pressures. Head CT on 4/22/23 negative for acute findings. EEG ordered. Patient transitioned to Zosyn and micafungin. LOLY noted at admission, improved initially with fluids but worsened again on 4/23/23, likely from poor oral intake. Fluids reinitiated and rivas placed, no additional increase in Cr noted and Nephrology was consulted for assessment and management of LOLY on CKD.        Interval History:   No acute events overnight. Pt tolerated the EGD without complication. APC performed to to resolved gastric bleeding. Few episodes of dark stool noted overnight. Hgb continued to be monitored.    Review of patient's allergies indicates:   Allergen Reactions    Motrin [ibuprofen] Itching     Current Facility-Administered Medications   Medication Frequency    acetaminophen tablet 650 mg Q8H PRN    amLODIPine tablet 10 mg Daily    ammonium lactate 12 % lotion Daily    atorvastatin tablet 40 mg QHS    carvediloL  tablet 12.5 mg BID    dextrose 10% bolus 125 mL 125 mL PRN    dextrose 10% bolus 250 mL 250 mL PRN    dextrose 40 % gel 15,000 mg PRN    dextrose 40 % gel 30,000 mg PRN    DULoxetine DR capsule 30 mg BID    glucagon (human recombinant) injection 1 mg PRN    insulin aspart U-100 pen 0-5 Units QID (AC + HS) PRN    loperamide capsule 2 mg QID PRN    micafungin 100 mg in sodium chloride 0.9 % 100 mL IVPB (MB+) Q24H    mupirocin 2 % ointment BID    naloxone 0.4 mg/mL injection 0.02 mg PRN    ondansetron injection 4 mg Q6H PRN    oxyCODONE immediate release tablet 5 mg Q4H PRN    pantoprazole EC tablet 40 mg BID AC    piperacillin-tazobactam (ZOSYN) 4.5 g in dextrose 5 % in water (D5W) 5 % 100 mL IVPB (MB+) Q12H    sodium chloride 0.9% flush 10 mL Q12H PRN    vits A and D-white pet-lanolin ointment PRN       Objective:     Review of Systems   Constitutional:  Positive for malaise/fatigue. Negative for chills and fever.   HENT:  Negative for congestion, hearing loss, sinus pain and sore throat.    Eyes:  Negative for blurred vision, photophobia and redness.   Respiratory:  Negative for cough, shortness of breath and wheezing.    Cardiovascular:  Negative for chest pain, palpitations and leg swelling.   Gastrointestinal:  Negative for blood in stool, constipation, diarrhea, nausea and vomiting.   Genitourinary:  Negative for dysuria and flank pain.   Musculoskeletal:  Positive for myalgias. Negative for falls and joint pain.   Skin:  Negative for itching and rash.   Neurological:  Negative for dizziness, tingling, weakness and headaches.   Psychiatric/Behavioral:  Negative for depression. The patient is not nervous/anxious.      Vital Signs (Most Recent):  Temp: 98.1 °F (36.7 °C) (04/27/23 0515)  Pulse: 93 (04/26/23 2323)  Resp: 16 (04/27/23 1301)  BP: (Abnormal) 148/67 (04/26/23 2323)  SpO2: (Abnormal) 94 % (04/26/23 2323)   Vital Signs (24h Range):  Temp:  [97.8 °F (36.6 °C)-98.5 °F (36.9 °C)] 98.1 °F  (36.7 °C)  Pulse:  [76-93] 93  Resp:  [16-20] 16  SpO2:  [94 %-98 %] 94 %  BP: (148-160)/(67-71) 148/67     Weight: 68 kg (150 lb) (04/20/23 2035)  Body mass index is 24.96 kg/m².  Body surface area is 1.77 meters squared.    I/O last 3 completed shifts:  In: 400 [P.O.:200; IV Piggyback:200]  Out: 1605 [Urine:1600; Drains:5]    Physical Exam  Vitals and nursing note reviewed.   Constitutional:       General: She is not in acute distress.     Appearance: She is not ill-appearing.   HENT:      Head: Normocephalic and atraumatic.      Right Ear: External ear normal.      Left Ear: External ear normal.      Nose: No congestion.      Mouth/Throat:      Pharynx: Oropharynx is clear.   Eyes:      General:         Right eye: No discharge.         Left eye: No discharge.      Extraocular Movements: Extraocular movements intact.      Conjunctiva/sclera: Conjunctivae normal.   Cardiovascular:      Rate and Rhythm: Normal rate and regular rhythm.      Pulses: Normal pulses.      Heart sounds: Normal heart sounds.   Pulmonary:      Effort: Pulmonary effort is normal. No respiratory distress.      Breath sounds: Normal breath sounds. No wheezing or rales.   Abdominal:      Palpations: Abdomen is soft.      Tenderness: There is no abdominal tenderness. There is no guarding.   Musculoskeletal:      Cervical back: Neck supple. No tenderness.      Right lower leg: No edema.      Left lower leg: No edema.   Skin:     General: Skin is warm.      Findings: Lesion present.      Comments: Dark skin discoloration over the dorsal aspect of the left foot. 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   Neurological:      Mental Status: Mental status is at baseline.   Psychiatric:         Attention and Perception: Attention normal.         Mood and Affect: Mood and affect normal.         Speech: Speech normal.         Behavior: Behavior is cooperative.       Significant Labs:  CBC:   Recent Labs   Lab 04/27/23  0277    WBC 16.30*   RBC 2.67*   HGB 7.2*   HCT 23.3*      MCV 87   MCH 27.0   MCHC 30.9*     CMP:   Recent Labs   Lab 04/27/23  0332   *   CALCIUM 8.9   ALBUMIN 1.9*   PROT 6.6      K 3.6   CO2 24      BUN 43*   CREATININE 2.4*   ALKPHOS 58   ALT <5*   AST 13   BILITOT 0.4     All labs within the past 24 hours have been reviewed.     Significant Imaging:  All image results in the past 24hrs have been reviewed     Assessment/Plan:     Renal/  * LOLY (acute kidney injury)  Ms Marino is a 83yo lady being treated for PAD associated with acute left foot pain and left foot skin changes. 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 Podiatry consulted for foot wounds with no new findings of infection. Pt presented with elevated creatinine concerning for LOLY, baseline previously < 1.0. GFR with Creatinine Clearance at 23% and 2.1mg/dL on 4/20/23 at 2059. 2L of IV LR infused from 0921on 4/21/23 to 0920 on 4/22/23. GFR and Creatinine Clearance increased as expected to 32% and 1.6mg/dL on 4/21/23 at 0400. Paniagua in place to r/o retention complications. No reported dysuria prior to admission and negative for UTI on admission. Retroperitoneal US was negative for hydronephrosis but elevated bilateral resistive indices concerning for medical renal disease. Progressive encephalopathy likely associated with neurotoxicity from cefepime therapy, held on 4/22/23. Switched to Zosyn and Micafungin for continued infectious coverage. ID consulted for continued antibiotic stewardship. Decreased Hgb of 6.6 on 4/22/23 concerning for GIB. One unit of pRBCs added to improve Hgb to 8.2. GFR with Creatinine Clearance at 18.7% and Cr of 2.5mg/dL on 4/24/23 despite fluid resuscitation. Urine analysis with reflex significant for >100 WBCs with moderate bacteria, cultures negative. Labs elevated for urine protein/creatinine (1.18), urine protein (105), serum protein (5.9) serum  kappa/lambda light chains (13.76/8.39). Proteinuria nonspecific and likely secondary to LOLY. Three monoclonal peaks noted on SPE. Multiple loose, lex-colored, BMs noted on 4/25/23 and 4/26/23. Pt taken for EGD with findings of three small angioectasias with bleeding in the gastric body. Bleeding resolved with argon plasma coagulation. Creatinine decreased from 2.7 to 2.4 on 4/27/23.     Recommendations:  --Trend CBC and Hgb in setting of GIB   --Transfuse pRBCs for Hgb < 7.0 (<8.0 for active bleed)   --IV fluid resuscitation  --Renal diet  --Monitor I/Os for fluid status  --Trend/correct electrolytes  --Avoid nephrotoxic agents (holding home Hyzaar and Lasix)  --Outpatient referral to Nephrology Clinic on discharge  --Outpatient referral to Heme/Onc for monoclonal peaks observed on SPE.    Thank you for your consult. I will follow-up with patient. Please contact us if you have any additional questions.    Flip Priest MD  Nephrology  [facility]

## 2023-04-27 NOTE — PLAN OF CARE
Problem: Adult Inpatient Plan of Care  Goal: Plan of Care Review  4/27/2023 0424 by Maude Noyola LPN  Outcome: Ongoing, Progressing  4/27/2023 0419 by Maude Noyola LPN  Outcome: Ongoing, Progressing  Goal: Patient-Specific Goal (Individualized)  4/27/2023 0424 by Maude Noyola LPN  Outcome: Ongoing, Progressing  4/27/2023 0419 by Maude Noyola LPN  Outcome: Ongoing, Progressing  Goal: Absence of Hospital-Acquired Illness or Injury  4/27/2023 0424 by Maude Noyola LPN  Outcome: Ongoing, Progressing  4/27/2023 0419 by Maude Noyola LPN  Outcome: Ongoing, Progressing  Goal: Optimal Comfort and Wellbeing  4/27/2023 0424 by Maude Noyola LPN  Outcome: Ongoing, Progressing  4/27/2023 0419 by Maude Noyola LPN  Outcome: Ongoing, Progressing  Goal: Readiness for Transition of Care  Outcome: Ongoing, Progressing     Problem: Adult Inpatient Plan of Care  Goal: Plan of Care Review  4/27/2023 0424 by Maude Noyola LPN  Outcome: Ongoing, Progressing     Problem: Adult Inpatient Plan of Care  Goal: Patient-Specific Goal (Individualized)  4/27/2023 0424 by Maude Noyola LPN  Outcome: Ongoing, Progressing  4/27/2023 0419 by Maude Noyola LPN  Outcome: Ongoing, Progressing

## 2023-04-27 NOTE — PROGRESS NOTES
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 Albicans, C. Glabrata, and prevotella) on 6 week course of outpatient cefepime (empiric given prior pseudomonas in TMA wound) and micafungin (EOC 5/16/23), admitted with left foot pain and skin changes concerning for necrosis, LOLY, and encephalopathy  2/2 cefepime neurotoxicity.       Transitioned to zosyn in lieu of cefepime on 4/22 with improvement in mental status  Continued worsening renal function despite fluids. Paniagua placed for urinary retention  U/A concerning for UTI, but urine culture negative.   Denies dysuria.  Renal US negative for hydronephrosis  Nephrology following. Hall's stain pending collection     Remains afebrile, no leukocytosis.  Wound Vac placed to wound today.  Also with persistent melanotic stools. EGD today showed 3 bleeding angioectasias, resolved with APC.       Plan/recommendations:   Continue IV Zosyn (renally dosed; currently q 12 hour dosing) and  IV micafungin 100 mg q 24 hours to complete original planned course - continue until ID follow up on 5/17/23   Weekly cbc, cmp, crp and fax results to ID, attention Noel Presley NP, at fax  [phone]   Subsequent oral suppression after completion of micafungin TBD at follow up.  Oral fluconazole may not be option given DAPT with plavix   Resume chronic keflex suppression for prior Proteus (history of right bypass graft infection)   ID follow up on 5/17/23 with Dr. Hopper   Please follow up Hall's stain    Will sign off.  Please call with questions or re-consult as needed.   See OPAT summary below     Neuro  Acute encephalopathy  Concern for cefepime neurotoxicity.   · Transitioned to zosyn 4/22 with improvement in mental status.  Back to baseline    Orthopedic  Wound infection    Due to wound dehiscence during prior admission.  · Antimicrobial therapy as above.  · Follow up with Plastic Surgery as outpatient per their last recommendations of 4/9/23    S/P transmetatarsal amputation of foot, right     S/p TMA 3/4/23 with wound dehiscence 3/10, cx + for Pseudomonas, s/p 14 days of oral cipro, noted to  have ischemic changes to right TMA site and left forefoot.  Podiatry has been following.  No clinical signs of infection.  Vascular following.  No further Vascular intervention planned.   Continue Wound Care as recommended by Podiatry      Outpatient Antibiotic Therapy Plan:    1) Infection: Vascular bypass graft infection    2) Discharge Antibiotics:    Intravenous antibiotics:   Zosyn 4.5 g  IV q 12  hours.  Adjust dosing to renal function    Micafungin 100 IV q 24 hours       3) Therapy Duration:  Total of 6 weeks from date of washout.  3 weeks to go    Estimated end date of IV antibiotics: 5/17/23    4) Outpatient Weekly Labs:    Order the following labs to be drawn on Mondays:    CBC   CMP    CRP    5) Fax Lab Results to Infectious Diseases Provider: Noel Presley    [facility] Fax Number: [phone]    6) Outpatient Infectious Diseases Follow-up    Follow-up appointment made for 5/17/23 with Dr. Hopper.      Thank you.   Please Secure Chat for any questions or concerns.  Stacie Presley  "MABEL, ANP-C      Subjective:     Principal Problem:LOLY (acute kidney injury)    HPI: An 82-year-old woman with HTN, DMII, DVT, CHF, PAD, DVT, left to right fem to SFA bypass and right SFA angioplasty with stent placement on 03/2/23, complicated by wound dehiscence of the left groin with graft exposure, and on outpatient cefepime plus micafungin (EOC5/16/23) who was admitted with left foot pain and skin changes concerning for necrosis. This was associated with progressive confusion. She was admitted to  service and continued on cefepime and micafungin. Her  reports confusion was worsened in the hospital since admission 2 days ago. He reports she has ongoing pain of her feet.    Infectious Diseases consulted for "Micafungin"        Interval History:   Mental status remains much improved.    Continues to have melena  EGD today showed bleeding angioectasias treated with APC.   Creatinine 2.7 (up from 2.6)  Urine cx negative.     Review of Systems   Constitutional:  Positive for activity change. Negative for appetite change, chills and fever.   Respiratory:  Negative for cough and shortness of breath.    Cardiovascular:  Negative for chest pain.   Gastrointestinal:  Positive for blood in stool and diarrhea. Negative for abdominal pain, constipation, nausea and vomiting.   Genitourinary:  Positive for difficulty urinating.   Musculoskeletal:  Positive for arthralgias and gait problem.   Skin:  Positive for wound.   Neurological:  Negative for dizziness, weakness and headaches.   Psychiatric/Behavioral:  Negative for confusion. The patient is not nervous/anxious.    Objective:     Vital Signs (Most Recent):  Temp: 98.5 °F (36.9 °C) (04/26/23 1934)  Pulse: 76 (04/26/23 1454)  Resp: (!) 25 (04/26/23 1132)  BP: (!) 160/71 (04/26/23 1454)  SpO2: 98 % (04/26/23 1132) Vital Signs (24h Range):  Temp:  [96.8 °F (36 °C)-98.5 °F (36.9 °C)] 98.5 °F (36.9 °C)  Pulse:  [70-86] 76  Resp:  [16-25] 25  SpO2:  [93 %-100 %] 98 " %  BP: ()/(46-76) 160/71     Weight: 68 kg (150 lb)  Body mass index is 24.96 kg/m².    Estimated Creatinine Clearance: 14.5 mL/min (A) (based on SCr of 2.7 mg/dL (H)).    Physical Exam  Vitals and nursing note reviewed.   Constitutional:       General: She is not in acute distress.     Appearance: She is well-developed. She is not toxic-appearing or diaphoretic.   HENT:      Head: Normocephalic and atraumatic.      Mouth/Throat:      Mouth: Mucous membranes are moist.   Eyes:      General: No scleral icterus.     Conjunctiva/sclera: Conjunctivae normal.   Cardiovascular:      Rate and Rhythm: Normal rate and regular rhythm.   Pulmonary:      Effort: Pulmonary effort is normal. No respiratory distress.   Abdominal:      General: Bowel sounds are normal. There is no distension.      Palpations: Abdomen is soft.      Tenderness: There is no abdominal tenderness.   Genitourinary:     Comments: Paniagua - urine dark, hematuria  Musculoskeletal:         General: No tenderness. Normal range of motion.      Right lower leg: No edema.      Left lower leg: No edema.   Skin:     General: Skin is warm and dry.      Comments: Dark skin discoloration over the dorsal aspect of the left foot.    Right foot TMA site c/d/i  Podiatry photos reviewed.  See below    Left groin dressing not examined today - Wound Care applying wound vac   Neurological:      Mental Status: She is alert and oriented to person, place, and time.      Comments: Alert, oriented X 3. Answers all questions appropriately   Psychiatric:         Mood and Affect: Mood normal.         Behavior: Behavior normal.             Significant Labs: Blood Culture:   Recent Labs   Lab 03/08/23  2325 03/10/23  0942 03/10/23  0943 03/29/23 2009 04/20/23 2057   LABBLOO No growth after 5 days.  No growth after 5 days. No growth after 5 days. No growth after 5 days. No growth after 5 days.  No growth after 5 days. No growth after 5 days.  No growth after 5 days.       BMP:    Recent Labs   Lab 04/26/23  0325   *      K 3.7      CO2 24   BUN 51*   CREATININE 2.7*   CALCIUM 9.0   MG 1.9       CBC:   Recent Labs   Lab 04/25/23  0455 04/25/23  1643 04/26/23  0325   WBC 14.73* 10.42 10.21   HGB 8.2* 8.1* 8.0*   HCT 26.2* 25.4* 25.3*    262 259       Urine Culture:   Recent Labs   Lab 04/24/23  1502   LABURIN No growth     Urine Studies:   Recent Labs   Lab 04/23/23  1116 04/24/23  1502   COLORU Straw  Straw [place]*   APPEARANCEUA Cloudy*  Cloudy* Ex.Turbid   PHUR 6.0  6.0 6.0   SPECGRAV 1.015  1.015 1.015   PROTEINUA 1+*  1+* 2+*   GLUCUA Negative  Negative Negative   KETONESU Trace*  Trace* Trace*   BILIRUBINUA Negative  Negative Negative   OCCULTUA 1+*  1+* 3+*   NITRITE Negative  Negative Negative   LEUKOCYTESUR Negative  Negative 3+*   RBCUA 4 >100*   WBCUA 0 >100*   BACTERIA Rare Moderate*   SQUAMEPITHEL 1  --    HYALINECASTS 0 0       Wound Culture:   Recent Labs   Lab 03/04/23  1141 03/10/23  1340 03/31/23  0805 03/31/23  0809   LABAERO No growth PSEUDOMONAS FLUORESCENS GROUP  Few  * FELI ALBICANS  Rare  * FELI GLABRATA  Rare  *         Significant Imaging: I have reviewed all pertinent imaging results/findings within the past 24 hours.

## 2023-04-27 NOTE — ASSESSMENT & PLAN NOTE
Patient's FSGs are uncontrolled due to hyperglycemia on current medication regimen.  Last A1c reviewed-   Lab Results   Component Value Date    HGBA1C 8.2 (H) 02/28/2023     Most recent fingerstick glucose reviewed-   Recent Labs   Lab 04/26/23  1602 04/26/23  1933 04/27/23  0747 04/27/23  1115   POCTGLUCOSE 206* 155* 157* 197*     Current correctional scale  Low  Maintain anti-hyperglycemic dose as follows-   Antihyperglycemics (From admission, onward)    Start     Stop Route Frequency Ordered    04/21/23 0355  insulin aspart U-100 pen 0-5 Units         -- SubQ Before meals & nightly PRN 04/21/23 0255        Hold Oral hypoglycemics while patient is in the hospital.

## 2023-04-27 NOTE — PT/OT/SLP PROGRESS
Speech Language Pathology Treatment    Patient Name:  Nelsy Marino   MRN:  [id number]  Admitting Diagnosis: LOLY (acute kidney injury)    Recommendations:                 General Recommendations:   check diet tolerance  Diet recommendations:  Soft & Bite Sized Diet - IDDSI Level 6, Liquid Diet Level: Thin liquids - IDDSI Level 0   Aspiration Precautions: 1 bite/sip at a time, Feed only when awake/alert, HOB to 90 degrees, Meds whole 1 at a time, Small bites/sips, and Standard aspiration precautions   General Precautions: Standard, fall, dental soft  Communication strategies:  none    Subjective     Spoke with RN prior to session. Pt asleep upon entry to room, easily roused with verbal and tactile cues and agreeable to ST. Family at bedside.     Pain/Comfort:  Pain Rating 1: 0/10  Pain Rating Post-Intervention 1: 0/10    Respiratory Status: Room air    Objective:     Has the patient been evaluated by SLP for swallowing?   Yes  Keep patient NPO? No     Pt seen bedside for dysphagia therapy. Repositioned pt to be sitting upright fully in bed prior to PO intake. She was able to self present water from cup rim and straw as well as sedrick cracker, demonstrating significantly prolonged mastication. She demonstrated cough x1 when taking large, consecutive sips of thin liquids to wash down a mouth full of cracker. No other s/sx of airway compromise appreciated with sips of thin and  bites of cracker. Discussed using bite of puree instead of sips of water to wash food down and clear oral cavity. Recommend she be placed on a dental soft diet and thin liquids at this time. SLP will follow up to ensure tolerande.      Education provided re: role of SLP, diet recs for dental soft solids and thin liquids, swallow precs/strategies, s/s aspiration and POC.  Pt and family verbalized understanding and agreement.     Assessment:     Nelsy Marino is a 82 y.o. female with an SLP diagnosis of Dysphagia.  SLP will follow up.    Goals:    Multidisciplinary Problems       SLP Goals          Problem: SLP    Goal Priority Disciplines Outcome   SLP Goal     SLP    Description: Speech Language Pathology Goals  Goals expected to be met by 4/30    1. Pt will tolerate diet of thin liquids and minced moist solids without overt clinical signs of aspiration                          Plan:     Patient to be seen:  3 x/week   Plan of Care expires:     Plan of Care reviewed with:  patient, family   SLP Follow-Up:  Yes       Discharge recommendations:  other (see comments)   Barriers to Discharge:  None    Time Tracking:     SLP Treatment Date:   04/27/23  Speech Start Time:  1047  Speech Stop Time:  1058     Speech Total Time (min):  11 min    Billable Minutes: Treatment Swallowing Dysfunction 11    04/27/2023

## 2023-04-27 NOTE — ASSESSMENT & PLAN NOTE
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 Albicans, C. Glabrata, and prevotella) on 6 week course of outpatient cefepime (empiric given prior pseudomonas in TMA wound) and micafungin (EOC 5/16/23), admitted with left foot pain and skin changes concerning for necrosis, LOLY, and encephalopathy  2/2 cefepime neurotoxicity.       Transitioned to zosyn in lieu of cefepime on 4/22 with improvement in mental status  Continued worsening renal function despite fluids. Paniagua placed for urinary retention  U/A concerning for UTI, but urine culture negative.   Denies dysuria.  Renal US negative for hydronephrosis  Nephrology following. Hall's stain pending collection     Remains afebrile, no leukocytosis.  Wound Vac placed to wound today.  Also with persistent melanotic stools. EGD today showed 3 bleeding angioectasias, resolved with APC.       Plan/recommendations:   Continue IV Zosyn (renally dosed; currently q 12 hour dosing) and  IV micafungin 100 mg q 24 hours to complete original planned course - continue until ID follow up on 5/17/23   Weekly cbc, cmp, crp and fax results to ID, attention Noel Presley NP, at fax [phone]   Subsequent oral suppression after completion of micafungin TBD at follow up.  Oral fluconazole may not be option given DAPT with plavix   Resume chronic keflex suppression for prior Proteus (history of right bypass graft infection)   ID follow up on 5/17/23 with Dr. Hopper   Please follow up Hall's stain    Will sign off.  Please call with questions or  re-consult as needed.   See OPAT summary below

## 2023-04-27 NOTE — PT/OT/SLP PROGRESS
Occupational Therapy      Patient Name:  Nelsy Marino   MRN:  [id number]    Patient not seen today secondary to  (attempted in the AM -therapists in room for treatment but transport arrived to take pt to vascular study. Attempted in thePM-pt had gotten pain meds and declined treatment). Will follow-up per plan of care    4/27/2023

## 2023-04-27 NOTE — ASSESSMENT & PLAN NOTE
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  Patient continues to have melena and requiring blood transfusion    GI consulted push enteroscopy 4/26 showed red blood in the gastric body and three bleeding angioectasias in the stomach treated with APC  Will reach out to GI/Vasc Surg for DAPT recommendations on d/c    - Continue PPI BID  - Maintain 2 large bore Ivs for volume resuscitation  - Trend CBC BID. Transfuse pRBC for Hb < 7 g/dL  - Stopped anticoagulation

## 2023-04-27 NOTE — ASSESSMENT & PLAN NOTE
A&O x3 on admission with waxing/waning per  at the bedside.  says she has been largely bed bound since discharge, but her waxing confusion was concerning to him. Patients mentation significantly improved after stopping cefepime.  Resolved; likely 2/2 cefepime neurotoxicity   - Repeat CT Head with no acute change  - EEG without seizures  - TSH, B12, B1, Vit E, Ammonia, RPR were unremarkable  - Cefepime changed to Zosyn  - F/u blood and Urine cultures

## 2023-04-27 NOTE — NURSING
Pt  had 3 episodes of dark stool Notified [facility] team  Md returned call and made aware.

## 2023-04-27 NOTE — PT/OT/SLP PROGRESS
Physical Therapy      Patient Name:  Nelsy Marino   MRN:  [id number]    Patient not seen today secondary to  (AM -therapists in room for treatment but transport arrived to take pt to vascular study. PM-pt had gotten pain meds and refused treatment.). Will follow-up at a later date.    4/27/2023  .

## 2023-04-27 NOTE — ANESTHESIA POSTPROCEDURE EVALUATION
Anesthesia Post Evaluation    Patient: Nelsy Marino    Procedure(s) Performed: Procedure(s) (LRB):  EGD (ESOPHAGOGASTRODUODENOSCOPY) (N/A)    Final Anesthesia Type: general      Patient location during evaluation: PACU  Patient participation: Yes- Able to Participate  Level of consciousness: awake  Post-procedure vital signs: reviewed and stable  Pain management: adequate  Airway patency: patent    PONV status at discharge: No PONV  Anesthetic complications: no      Cardiovascular status: blood pressure returned to baseline  Respiratory status: unassisted, spontaneous ventilation and room air            Vitals Value Taken Time   /64 04/27/23 1249   Temp 36.7 °C (98.1 °F) 04/27/23 0515   Pulse 80 04/27/23 1308   Resp 20 04/27/23 1308   SpO2 96 % 04/27/23 1249   Vitals shown include unvalidated device data.      Event Time   Out of Recovery 12:15:00         Pain/Ana Maria Score: Pain Rating Prior to Med Admin: 6 (4/27/2023  1:01 PM)  Pain Rating Post Med Admin: 2 (4/27/2023  9:51 AM)  Ana Maria Score: 9 (4/26/2023 12:00 PM)

## 2023-04-27 NOTE — ASSESSMENT & PLAN NOTE
Concern for cefepime neurotoxicity.   · Transitioned to zosyn 4/22 with improvement in mental status.  Back to baseline

## 2023-04-27 NOTE — PLAN OF CARE
04/27/23 1222   Post-Acute Status   Post-Acute Authorization Placement   Post-Acute Placement Status Pending payor review/awaiting authorization (if required)   Coverage [employer]   Discharge Plan   Discharge Plan A Skilled Nursing Facility     SW met with pt and family at bedside. Both spouse and his sister are in agreeable with [facility] Phone: [phone]  (RAPID TEST DAY OF DC)Family verbalized understanding. Spouse is schedule for 2 pm appointment at the facility to complete the financials today. SW will follow.      Diandra Roberts LMSW  PRN - [facility]  [id number]

## 2023-04-27 NOTE — PROGRESS NOTES
[facility] Medicine  Progress Note    Patient Name: Nelsy Marino  MRN: [id number]  Patient Class: IP- Inpatient   Admission Date: 4/20/2023  Length of Stay: 5 days  Attending Physician: Shadi Vidal MD  Primary Care Provider: Juan F Garay        Subjective:     Principal Problem:LOLY (acute kidney injury)        HPI:  This is an 82-year-old female with a past medical history of severe PAD, hypertension, type 2 diabetes, HFpEF (EF 65%), L femoral bypass and L leg flap, R toe ampuations 2 months prior with presenting after  noted waxing/waning confusion since her discharge from hospital two weeks ago. History largely provided by  at bedside as patient is intermittently somnolent. She was discharged on IV Cefepime and Micafungin for her R foot amputation and patient reports adherence with regimen at home. He grew concerned that int he past two weeks she had worsening LLE pain and darkened skin, which is what happened before her R toes were amputated. His niece contacted a nurse who recommneded ED evaluation. No fevers, chills, HA, chest pain, SOB, N/V/D, abdominal pain.    Pt receives  nurse aide, PT/OT since hospital DC and ID very recently made a referral to podiatry and wound care, but appointments not yet made.     In the ED patient was hemodynamically stable and hypertensive, intermittently on RA to 2L NC. Leukocytosis to 13, Hgb 7.5, Cr 2.1, Albumin 2.1, BCx in process, CT head and CXR wnl. Started on BS Abx with continued Cefepime, [place], started on Vanc pending Bcx, US LE arterial with diminished flow throughout the left lower extremity. Monophasic waveforms suggesting atherosclerotic disease. Admitted to  for LOLY and Podiatry/Wound care.       Overview/Hospital Course:  Admitted to hospital medicine for LOLY and left foot pain. Cr on admission 2.1 (bl.8-1.5), improved with IVF initially. After holding IVF Cr increased, and then unresponsive to fluids. Patient with  significant urine retention, rivas placed and Cr again increased. Consulted Nephrology for assistance, suspect due to acute drop in Hgb. LOLY improved 4/27. Nephrology recommended heme/onc evaluation for this patient regarding the three monoclonal bands seen on SPE. Spoke with heme/onc regarding case, labs ordered in anticipation of outpatient evaluation given that patient stable. Patient having increased soft bowel movements with melena 4/25. GI consulted performed EGD with small bowel follow through 4/26 revealed red blood in the gastric body with three bleeding angioectasias in the stomach treated with APC. DAPT held in setting of acute bleed.    Vascular surgery consulted on admission, recommended repeat ARCHIE and left foot arterial doppler. Podiatry also saw the patient for left foot wound, recommended wound vac placement. Both Vascular and Podiatry felt no further intervention necessary at this time.     ID consulted, patient was receiving cefepime and micafungin outpatient per ID following R foot transmetatarsal amputation. Started on vanc, cefepime, and micafungin. Patient more lethargic 4/22 although able to follow commands and move extremities on exam. With concern for cefepime toxicity, ID removed cefepime and vancomycin. Micafungin and zosyn continued. EEG and CTH ordered, no acute abnormalities. Patient significantly improved following d/c Cefepime to baseline. Patient to continue zosyn and micafungin until ID follow up 5/17 and then continue keflex ppx thereafter per ID.    SLP consulted recommended pureed dysphagia diet. PT/OT recommended SNF placement.       Interval History: This morning patient continued to have 3x dark stools overnight. Hgb 7.1, repeat cbc afternoon. LOLY improving. Patients  meeting with CM regarding possible SNF placement tmrw.     Review of Systems   Constitutional:  Negative for activity change, appetite change and fever.   HENT:  Negative for congestion and sore throat.     Respiratory:  Negative for cough, shortness of breath and wheezing.    Cardiovascular:  Negative for chest pain and leg swelling.   Gastrointestinal:  Negative for abdominal pain, constipation, diarrhea, nausea and vomiting.        Melena   Genitourinary:  Negative for difficulty urinating and dysuria.   Musculoskeletal:  Positive for arthralgias and myalgias.   Skin:  Positive for color change and wound. Negative for rash.   Allergic/Immunologic: Negative.    Neurological:  Negative for dizziness and headaches.   Hematological: Negative.    Objective:     Vital Signs (Most Recent):  Temp: 98.1 °F (36.7 °C) (04/27/23 0515)  Pulse: 93 (04/26/23 2323)  Resp: 17 (04/27/23 0851)  BP: (!) 148/67 (04/26/23 2323)  SpO2: (!) 94 % (04/26/23 2323)   Vital Signs (24h Range):  Temp:  [97.8 °F (36.6 °C)-98.5 °F (36.9 °C)] 98.1 °F (36.7 °C)  Pulse:  [76-93] 93  Resp:  [17-20] 17  SpO2:  [94 %-98 %] 94 %  BP: (148-160)/(67-71) 148/67     Weight: 68 kg (150 lb)  Body mass index is 24.96 kg/m².    Intake/Output Summary (Last 24 hours) at 4/27/2023 1255  Last data filed at 4/27/2023 0338  Gross per 24 hour   Intake 200 ml   Output 800 ml   Net -600 ml        Physical Exam  Vitals and nursing note reviewed.   Constitutional:       General: She is not in acute distress.     Appearance: She is not ill-appearing.   HENT:      Head: Normocephalic and atraumatic.      Mouth/Throat:      Mouth: Mucous membranes are moist.      Pharynx: Oropharynx is clear.   Eyes:      Extraocular Movements: Extraocular movements intact.      Conjunctiva/sclera: Conjunctivae normal.   Cardiovascular:      Rate and Rhythm: Regular rhythm. Tachycardia present.      Pulses: Normal pulses.   Pulmonary:      Effort: Pulmonary effort is normal. No respiratory distress.      Breath sounds: No wheezing or rales.   Abdominal:      Palpations: Abdomen is soft.      Tenderness: There is no abdominal tenderness. There is no guarding.   Genitourinary:     Comments:  Black stool on diaper  Musculoskeletal:      Cervical back: Normal range of motion and neck supple.      Right lower leg: No edema.      Left lower leg: No edema.   Skin:     General: Skin is warm.      Findings: Lesion (R heel, L dorsum with darkened skin) present.      Comments: Insicion on right inner thigh- c/d/I- no drainage or erythema seen.  Wound on left thigh- with dressing on top and drain in place with serosanguinous fluid.    Neurological:      General: No focal deficit present.      Mental Status: Mental status is at baseline.   Psychiatric:         Mood and Affect: Mood normal.       Significant Labs: All pertinent labs within the past 24 hours have been reviewed.  CBC:   Recent Labs   Lab 04/25/23  1643 04/26/23  0325 04/27/23  0332   WBC 10.42 10.21 16.30*   HGB 8.1* 8.0* 7.2*   HCT 25.4* 25.3* 23.3*    259 228       CMP:   Recent Labs   Lab 04/26/23 0325 04/27/23  0332    144   K 3.7 3.6    109   CO2 24 24   * 139*   BUN 51* 43*   CREATININE 2.7* 2.4*   CALCIUM 9.0 8.9   PROT 6.9 6.6   ALBUMIN 2.0* 1.9*   BILITOT 0.4 0.4   ALKPHOS 63 58   AST 14 13   ALT 5* <5*   ANIONGAP 10 11         Significant Imaging: I have reviewed all pertinent imaging results/findings within the past 24 hours.      Assessment/Plan:      * LOLY (acute kidney injury)  Patient with acute kidney injury likely due to IVVD/dehydration LOLY is currently worsening. Labs reviewed- Renal function/electrolytes with Estimated Creatinine Clearance: 16.3 mL/min (A) (based on SCr of 2.4 mg/dL (H)). according to latest data. Monitor urine output and serial BMP and adjust therapy as needed. Avoid nephrotoxins and renally dose meds for GFR listed above.     Cr 2.1 (baseline 0.8-1.5).  Patient dry on physical exam.  Poor oral intake, thought to be prerenal in origin initially. Possible due to acute on chronic anemia.    Recent Labs   Lab 04/26/23  0325 04/27/23  0332    144   K 3.7 3.6    109   CO2 24 24    BUN 51* 43*   CREATININE 2.7* 2.4*     - Nephrology consulted; appreciate assistance   - Will refer to heme/onc on discharge for further workup of three monoclonal bands seen on the SPE  - Paniagua placed after patient retaining on bladder scan 4/23 with hematuria 4/24  - RP u/s no hydronephrosis  - Urine lytes showed possible intrinsic etiology  - CMP qd, trend Cr  - strict I/O  - daily weights  - renally dose all medications  - avoid nephrotoxic agents, NSAIDs, IV contrast, ACE/ARB      Debility  - PT/OT reconsulted  - Fall precautions  - Established with HH PT/OT/nurse aide prior to hospitalization  - SW assisting in SNF placement       Skin ulcer of right heel, limited to breakdown of skin  See PVD for plan    - Wound care and podiatry consulted with appreciated mgmt recs      Acute on chronic anemia  Baseline 8.6-11.   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  Patient continues to have melena and requiring blood transfusion    GI consulted push enteroscopy 4/26 showed red blood in the gastric body and three bleeding angioectasias in the stomach treated with APC  Will reach out to GI/Vasc Surg for DAPT recommendations on d/c    - Continue PPI BID  - Maintain 2 large bore Ivs for volume resuscitation  - Trend CBC BID. Transfuse pRBC for Hb < 7 g/dL  - Stopped anticoagulation        Acute encephalopathy  A&O x3 on admission with waxing/waning per  at the bedside.  says she has been largely bed bound since discharge, but  her waxing confusion was concerning to him. Patients mentation significantly improved after stopping cefepime.  Resolved; likely 2/2 cefepime neurotoxicity   - Repeat CT Head with no acute change  - EEG without seizures  - TSH, B12, B1, Vit E, Ammonia, RPR were unremarkable  - Cefepime changed to Zosyn  - F/u blood and Urine cultures      S/P transmetatarsal amputation of foot, right  Appreciated evaluation by Podiatry      Diastolic CHF, chronic  Patient is identified as having Diastolic (HFpEF) heart failure that is Chronic. CHF is currently controlled. Latest ECHO performed and demonstrates- Results for orders placed during the hospital encounter of 02/27/23    Echo    Interpretation Summary  · The estimated ejection fraction is 65%.  · The left ventricle is normal in size with normal systolic function.  · Grade II left ventricular diastolic dysfunction.  · Normal right ventricular size with normal right ventricular systolic function.  · Mild left atrial enlargement.  · Mild-to-moderate mitral regurgitation.  · There is pulmonary hypertension.  · The estimated PA systolic pressure is 55 mmHg.  · Intermediate central venous pressure (8 mmHg).  . Continue Beta Blocker and Furosemide and monitor clinical status closely. Monitor on telemetry. Patient is off CHF pathway.  Monitor strict Is&Os and daily weights.  Place on fluid restriction of 1.5 L. Continue to stress to patient importance of self efficacy and  on diet for CHF. Last BNP reviewed- and noted below No results for input(s): BNP, BNPTRIAGEBLO in the last 168 hours..    - Holding home Lasix in setting of LOLY  - Holding Hyzaar (HCTZ) in setting of LOLY  - Patient breathing comfortably on RA, no signs of volume overload      Type 2 diabetes mellitus with chronic kidney disease, with long-term current use of insulin  Patient's FSGs are uncontrolled due to hyperglycemia on current medication regimen.  Last A1c reviewed-   Lab Results   Component Value Date     HGBA1C 8.2 (H) 02/28/2023     Most recent fingerstick glucose reviewed-   Recent Labs   Lab 04/26/23  1602 04/26/23  1933 04/27/23  0747 04/27/23  1115   POCTGLUCOSE 206* 155* 157* 197*     Current correctional scale  Low  Maintain anti-hyperglycemic dose as follows-   Antihyperglycemics (From admission, onward)    Start     Stop Route Frequency Ordered    04/21/23 0355  insulin aspart U-100 pen 0-5 Units         -- SubQ Before meals & nightly PRN 04/21/23 0255        Hold Oral hypoglycemics while patient is in the hospital.    Essential hypertension  - Continue home Coreg, amlodipine  - hold Hyzaar in setting of LOLY      PAD (peripheral artery disease)  Recently discharged home with IV Cefepime 2g Q12 hours + IV Micafungin 100mg Q24 hours for estimated end of therapy date of 5/16/2023. For R toe amputation. Patient's  reports adherence with IV Abx. L foot with darkening skin changes, which alarmed  as this was how skin presented prior to toe amputations of R foot. RLE warm to touch with L US Lower Extremity Arteries demonstrating diminished flow throughout the left lower extremity. Monophasic waveforms suggesting atherosclerotic disease.     - Podiatry and Vascular Surgery following; appreciate recs  - Will resume ASA, Plavix per GI / Vascular surgery recommendations  - Continue Micafungin and Zosyn per prior ID recs until 5/17 to then continue Keflex ppx per ID thereafter for graft infection      VTE Risk Mitigation (From admission, onward)         Ordered     IP VTE HIGH RISK PATIENT  Once         04/21/23 0254     Place sequential compression device  Until discontinued         04/21/23 0254                Discharge Planning   JUAN ALBERTO: 4/28/2023     Code Status: Full Code   Is the patient medically ready for discharge?: No    Reason for patient still in hospital (select all that apply): Patient trending condition, Consult recommendations and Pending disposition  Discharge Plan A: Skilled Nursing  Facility                  Boris Mckeon DO  Department of Hospital Medicine   [facility]

## 2023-04-27 NOTE — ASSESSMENT & PLAN NOTE
Recently discharged home with IV Cefepime 2g Q12 hours + IV Micafungin 100mg Q24 hours for estimated end of therapy date of 5/16/2023. For R toe amputation. Patient's  reports adherence with IV Abx. L foot with darkening skin changes, which alarmed  as this was how skin presented prior to toe amputations of R foot. RLE warm to touch with L US Lower Extremity Arteries demonstrating diminished flow throughout the left lower extremity. Monophasic waveforms suggesting atherosclerotic disease.     - Podiatry and Vascular Surgery following; appreciate recs  - Will resume ASA, Plavix per GI / Vascular surgery recommendations  - Continue Micafungin and Zosyn per prior ID recs until 5/17 to then continue Keflex ppx per ID thereafter for graft infection

## 2023-04-27 NOTE — ASSESSMENT & PLAN NOTE
Patient with acute kidney injury likely due to IVVD/dehydration LOLY is currently worsening. Labs reviewed- Renal function/electrolytes with Estimated Creatinine Clearance: 16.3 mL/min (A) (based on SCr of 2.4 mg/dL (H)). according to latest data. Monitor urine output and serial BMP and adjust therapy as needed. Avoid nephrotoxins and renally dose meds for GFR listed above.     Cr 2.1 (baseline 0.8-1.5).  Patient dry on physical exam.  Poor oral intake, thought to be prerenal in origin initially. Possible due to acute on chronic anemia.    Recent Labs   Lab 04/26/23  0325 04/27/23  0332    144   K 3.7 3.6    109   CO2 24 24   BUN 51* 43*   CREATININE 2.7* 2.4*     - Nephrology consulted; appreciate assistance   - Will refer to heme/onc on discharge for further workup of three monoclonal bands seen on the SPE  - Paniagua placed after patient retaining on bladder scan 4/23 with hematuria 4/24  - RP u/s no hydronephrosis  - Urine lytes showed possible intrinsic etiology  - CMP qd, trend Cr  - strict I/O  - daily weights  - renally dose all medications  - avoid nephrotoxic agents, NSAIDs, IV contrast, ACE/ARB

## 2023-04-27 NOTE — ASSESSMENT & PLAN NOTE
Ms Marino is a 83yo lady being treated for PAD associated with acute left foot pain and left foot skin changes. 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 Podiatry consulted for foot wounds with no new findings of infection. Pt presented with elevated creatinine concerning for LOLY, baseline previously < 1.0. GFR with Creatinine Clearance at 23% and 2.1mg/dL on 4/20/23 at 2059. 2L of IV LR infused from 0921on 4/21/23 to 0920 on 4/22/23. GFR and Creatinine Clearance increased as expected to 32% and 1.6mg/dL on 4/21/23 at 0400. Paniagua in place to r/o retention complications. No reported dysuria prior to admission and negative for UTI on admission. Retroperitoneal US was negative for hydronephrosis but elevated bilateral resistive indices concerning for medical renal disease. Progressive encephalopathy likely associated with neurotoxicity from cefepime therapy, held on 4/22/23. Switched to Zosyn and Micafungin for continued infectious coverage. ID consulted for continued antibiotic stewardship. Decreased Hgb of 6.6 on 4/22/23 concerning for GIB. One unit of pRBCs added to improve Hgb to 8.2. GFR with Creatinine Clearance at 18.7% and Cr of 2.5mg/dL on 4/24/23 despite fluid resuscitation. Urine analysis with reflex significant for >100 WBCs with moderate bacteria, cultures negative. Labs elevated for urine protein/creatinine (1.18), urine protein (105), serum protein (5.9) serum kappa/lambda light chains (13.76/8.39). Proteinuria nonspecific and likely secondary to LOLY. Three monoclonal peaks noted on SPE. Multiple loose, lex-colored, BMs noted on 4/25/23 and 4/26/23. Pt taken for EGD with findings of three small angioectasias with bleeding in the gastric body. Bleeding resolved with argon plasma coagulation. Creatinine decreased from 2.7 to 2.4 on 4/27/23.     Recommendations:  --Trend CBC and Hgb in setting of GIB   --Transfuse pRBCs for Hgb < 7.0 (<8.0 for active  bleed)   --IV fluid resuscitation  --Renal diet  --Monitor I/Os for fluid status  --Trend/correct electrolytes  --Avoid nephrotoxic agents (holding home Hyzaar and Lasix)  --Outpatient referral to Nephrology Clinic on discharge  --Outpatient referral to Heme/Onc for monoclonal peaks observed on SPE.

## 2023-04-27 NOTE — SUBJECTIVE & OBJECTIVE
Interval History: This morning patient continued to have 3x dark stools overnight. Hgb 7.1, repeat cbc afternoon. LOLY improving. Patients  meeting with CM regarding possible SNF placement tmrw.     Review of Systems   Constitutional:  Negative for activity change, appetite change and fever.   HENT:  Negative for congestion and sore throat.    Respiratory:  Negative for cough, shortness of breath and wheezing.    Cardiovascular:  Negative for chest pain and leg swelling.   Gastrointestinal:  Negative for abdominal pain, constipation, diarrhea, nausea and vomiting.        Melena   Genitourinary:  Negative for difficulty urinating and dysuria.   Musculoskeletal:  Positive for arthralgias and myalgias.   Skin:  Positive for color change and wound. Negative for rash.   Allergic/Immunologic: Negative.    Neurological:  Negative for dizziness and headaches.   Hematological: Negative.    Objective:     Vital Signs (Most Recent):  Temp: 98.1 °F (36.7 °C) (04/27/23 0515)  Pulse: 93 (04/26/23 2323)  Resp: 17 (04/27/23 0851)  BP: (!) 148/67 (04/26/23 2323)  SpO2: (!) 94 % (04/26/23 2323)   Vital Signs (24h Range):  Temp:  [97.8 °F (36.6 °C)-98.5 °F (36.9 °C)] 98.1 °F (36.7 °C)  Pulse:  [76-93] 93  Resp:  [17-20] 17  SpO2:  [94 %-98 %] 94 %  BP: (148-160)/(67-71) 148/67     Weight: 68 kg (150 lb)  Body mass index is 24.96 kg/m².    Intake/Output Summary (Last 24 hours) at 4/27/2023 1255  Last data filed at 4/27/2023 0338  Gross per 24 hour   Intake 200 ml   Output 800 ml   Net -600 ml        Physical Exam  Vitals and nursing note reviewed.   Constitutional:       General: She is not in acute distress.     Appearance: She is not ill-appearing.   HENT:      Head: Normocephalic and atraumatic.      Mouth/Throat:      Mouth: Mucous membranes are moist.      Pharynx: Oropharynx is clear.   Eyes:      Extraocular Movements: Extraocular movements intact.      Conjunctiva/sclera: Conjunctivae normal.   Cardiovascular:      Rate  and Rhythm: Regular rhythm. Tachycardia present.      Pulses: Normal pulses.   Pulmonary:      Effort: Pulmonary effort is normal. No respiratory distress.      Breath sounds: No wheezing or rales.   Abdominal:      Palpations: Abdomen is soft.      Tenderness: There is no abdominal tenderness. There is no guarding.   Genitourinary:     Comments: Black stool on diaper  Musculoskeletal:      Cervical back: Normal range of motion and neck supple.      Right lower leg: No edema.      Left lower leg: No edema.   Skin:     General: Skin is warm.      Findings: Lesion (R heel, L dorsum with darkened skin) present.      Comments: Insicion on right inner thigh- c/d/I- no drainage or erythema seen.  Wound on left thigh- with dressing on top and drain in place with serosanguinous fluid.    Neurological:      General: No focal deficit present.      Mental Status: Mental status is at baseline.   Psychiatric:         Mood and Affect: Mood normal.       Significant Labs: All pertinent labs within the past 24 hours have been reviewed.  CBC:   Recent Labs   Lab 04/25/23  1643 04/26/23  0325 04/27/23  0332   WBC 10.42 10.21 16.30*   HGB 8.1* 8.0* 7.2*   HCT 25.4* 25.3* 23.3*    259 228       CMP:   Recent Labs   Lab 04/26/23  0325 04/27/23  0332    144   K 3.7 3.6    109   CO2 24 24   * 139*   BUN 51* 43*   CREATININE 2.7* 2.4*   CALCIUM 9.0 8.9   PROT 6.9 6.6   ALBUMIN 2.0* 1.9*   BILITOT 0.4 0.4   ALKPHOS 63 58   AST 14 13   ALT 5* <5*   ANIONGAP 10 11         Significant Imaging: I have reviewed all pertinent imaging results/findings within the past 24 hours.

## 2023-04-27 NOTE — PLAN OF CARE
04/27/23 1144   Post-Acute Status   Post-Acute Authorization Placement   Coverage [employer]   Discharge Plan   Discharge Plan A Skilled Nursing Facility     1.[facility] Phone: [phone]  -No, unable to accept patient  Reason: can't meet need  2.[facility] Phone: [phone]  -willing to accept pt, needs the financials      SW will follow.    Diandra Roberts LMSW  PRN - [facility]  [id number]

## 2023-04-27 NOTE — SUBJECTIVE & OBJECTIVE
Interval History:   No acute events overnight. Pt tolerated the EGD without complication. APC performed to to resolved gastric bleeding. Few episodes of dark stool noted overnight. Hgb continued to be monitored.    Review of patient's allergies indicates:   Allergen Reactions    Motrin [ibuprofen] Itching     Current Facility-Administered Medications   Medication Frequency    acetaminophen tablet 650 mg Q8H PRN    amLODIPine tablet 10 mg Daily    ammonium lactate 12 % lotion Daily    atorvastatin tablet 40 mg QHS    carvediloL tablet 12.5 mg BID    dextrose 10% bolus 125 mL 125 mL PRN    dextrose 10% bolus 250 mL 250 mL PRN    dextrose 40 % gel 15,000 mg PRN    dextrose 40 % gel 30,000 mg PRN    DULoxetine DR capsule 30 mg BID    glucagon (human recombinant) injection 1 mg PRN    insulin aspart U-100 pen 0-5 Units QID (AC + HS) PRN    loperamide capsule 2 mg QID PRN    micafungin 100 mg in sodium chloride 0.9 % 100 mL IVPB (MB+) Q24H    mupirocin 2 % ointment BID    naloxone 0.4 mg/mL injection 0.02 mg PRN    ondansetron injection 4 mg Q6H PRN    oxyCODONE immediate release tablet 5 mg Q4H PRN    pantoprazole EC tablet 40 mg BID AC    piperacillin-tazobactam (ZOSYN) 4.5 g in dextrose 5 % in water (D5W) 5 % 100 mL IVPB (MB+) Q12H    sodium chloride 0.9% flush 10 mL Q12H PRN    vits A and D-white pet-lanolin ointment PRN       Objective:     Review of Systems   Constitutional:  Positive for malaise/fatigue. Negative for chills and fever.   HENT:  Negative for congestion, hearing loss, sinus pain and sore throat.    Eyes:  Negative for blurred vision, photophobia and redness.   Respiratory:  Negative for cough, shortness of breath and wheezing.    Cardiovascular:  Negative for chest pain, palpitations and leg swelling.   Gastrointestinal:  Negative for blood in stool, constipation, diarrhea, nausea and vomiting.   Genitourinary:  Negative for dysuria and flank pain.   Musculoskeletal:  Positive for myalgias. Negative  for falls and joint pain.   Skin:  Negative for itching and rash.   Neurological:  Negative for dizziness, tingling, weakness and headaches.   Psychiatric/Behavioral:  Negative for depression. The patient is not nervous/anxious.      Vital Signs (Most Recent):  Temp: 98.1 °F (36.7 °C) (04/27/23 0515)  Pulse: 93 (04/26/23 2323)  Resp: 16 (04/27/23 1301)  BP: (Abnormal) 148/67 (04/26/23 2323)  SpO2: (Abnormal) 94 % (04/26/23 2323)   Vital Signs (24h Range):  Temp:  [97.8 °F (36.6 °C)-98.5 °F (36.9 °C)] 98.1 °F (36.7 °C)  Pulse:  [76-93] 93  Resp:  [16-20] 16  SpO2:  [94 %-98 %] 94 %  BP: (148-160)/(67-71) 148/67     Weight: 68 kg (150 lb) (04/20/23 2035)  Body mass index is 24.96 kg/m².  Body surface area is 1.77 meters squared.    I/O last 3 completed shifts:  In: 400 [P.O.:200; IV Piggyback:200]  Out: 1605 [Urine:1600; Drains:5]    Physical Exam  Vitals and nursing note reviewed.   Constitutional:       General: She is not in acute distress.     Appearance: She is not ill-appearing.   HENT:      Head: Normocephalic and atraumatic.      Right Ear: External ear normal.      Left Ear: External ear normal.      Nose: No congestion.      Mouth/Throat:      Pharynx: Oropharynx is clear.   Eyes:      General:         Right eye: No discharge.         Left eye: No discharge.      Extraocular Movements: Extraocular movements intact.      Conjunctiva/sclera: Conjunctivae normal.   Cardiovascular:      Rate and Rhythm: Normal rate and regular rhythm.      Pulses: Normal pulses.      Heart sounds: Normal heart sounds.   Pulmonary:      Effort: Pulmonary effort is normal. No respiratory distress.      Breath sounds: Normal breath sounds. No wheezing or rales.   Abdominal:      Palpations: Abdomen is soft.      Tenderness: There is no abdominal tenderness. There is no guarding.   Musculoskeletal:      Cervical back: Neck supple. No tenderness.      Right lower leg: No edema.      Left lower leg: No edema.   Skin:     General: Skin  is warm.      Findings: Lesion present.      Comments: Dark skin discoloration over the dorsal aspect of the left foot. 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   Neurological:      Mental Status: Mental status is at baseline.   Psychiatric:         Attention and Perception: Attention normal.         Mood and Affect: Mood and affect normal.         Speech: Speech normal.         Behavior: Behavior is cooperative.       Significant Labs:  CBC:   Recent Labs   Lab 04/27/23  0332   WBC 16.30*   RBC 2.67*   HGB 7.2*   HCT 23.3*      MCV 87   MCH 27.0   MCHC 30.9*     CMP:   Recent Labs   Lab 04/27/23  0332   *   CALCIUM 8.9   ALBUMIN 1.9*   PROT 6.6      K 3.6   CO2 24      BUN 43*   CREATININE 2.4*   ALKPHOS 58   ALT <5*   AST 13   BILITOT 0.4     All labs within the past 24 hours have been reviewed.     Significant Imaging:  All image results in the past 24hrs have been reviewed

## 2023-04-28 PROBLEM — E44.0 MODERATE MALNUTRITION: Status: ACTIVE | Noted: 2023-04-28

## 2023-04-28 LAB
ALBUMIN SERPL BCP-MCNC: 1.9 G/DL (ref 3.5–5.2)
ALP SERPL-CCNC: 56 U/L (ref 55–135)
ALT SERPL W/O P-5'-P-CCNC: 5 U/L (ref 10–44)
ANION GAP SERPL CALC-SCNC: 12 MMOL/L (ref 8–16)
AST SERPL-CCNC: 13 U/L (ref 10–40)
BASOPHILS # BLD AUTO: 0.03 K/UL (ref 0–0.2)
BASOPHILS NFR BLD: 0.2 % (ref 0–1.9)
BILIRUB SERPL-MCNC: 0.4 MG/DL (ref 0.1–1)
BUN SERPL-MCNC: 36 MG/DL (ref 8–23)
CALCIUM SERPL-MCNC: 9 MG/DL (ref 8.7–10.5)
CHLORIDE SERPL-SCNC: 107 MMOL/L (ref 95–110)
CO2 SERPL-SCNC: 22 MMOL/L (ref 23–29)
CREAT SERPL-MCNC: 2.3 MG/DL (ref 0.5–1.4)
DIFFERENTIAL METHOD: ABNORMAL
EOSINOPHIL # BLD AUTO: 0.4 K/UL (ref 0–0.5)
EOSINOPHIL NFR BLD: 3.3 % (ref 0–8)
ERYTHROCYTE [DISTWIDTH] IN BLOOD BY AUTOMATED COUNT: 14.8 % (ref 11.5–14.5)
EST. GFR  (NO RACE VARIABLE): 20.7 ML/MIN/1.73 M^2
GLUCOSE SERPL-MCNC: 149 MG/DL (ref 70–110)
HCT VFR BLD AUTO: 23.2 % (ref 37–48.5)
HGB BLD-MCNC: 7.1 G/DL (ref 12–16)
IMM GRANULOCYTES # BLD AUTO: 0.03 K/UL (ref 0–0.04)
IMM GRANULOCYTES NFR BLD AUTO: 0.2 % (ref 0–0.5)
LYMPHOCYTES # BLD AUTO: 1.2 K/UL (ref 1–4.8)
LYMPHOCYTES NFR BLD: 9.7 % (ref 18–48)
MAGNESIUM SERPL-MCNC: 1.6 MG/DL (ref 1.6–2.6)
MCH RBC QN AUTO: 26.5 PG (ref 27–31)
MCHC RBC AUTO-ENTMCNC: 30.6 G/DL (ref 32–36)
MCV RBC AUTO: 87 FL (ref 82–98)
MONOCYTES # BLD AUTO: 0.5 K/UL (ref 0.3–1)
MONOCYTES NFR BLD: 3.8 % (ref 4–15)
NEUTROPHILS # BLD AUTO: 10.3 K/UL (ref 1.8–7.7)
NEUTROPHILS NFR BLD: 82.8 % (ref 38–73)
NRBC BLD-RTO: 0 /100 WBC
PHOSPHATE SERPL-MCNC: 3.4 MG/DL (ref 2.7–4.5)
PLATELET # BLD AUTO: 209 K/UL (ref 150–450)
PMV BLD AUTO: 11.4 FL (ref 9.2–12.9)
POCT GLUCOSE: 157 MG/DL (ref 70–110)
POCT GLUCOSE: 158 MG/DL (ref 70–110)
POCT GLUCOSE: 179 MG/DL (ref 70–110)
POCT GLUCOSE: 250 MG/DL (ref 70–110)
POTASSIUM SERPL-SCNC: 3.2 MMOL/L (ref 3.5–5.1)
PROT SERPL-MCNC: 6.8 G/DL (ref 6–8.4)
RBC # BLD AUTO: 2.68 M/UL (ref 4–5.4)
SARS-COV-2 RNA RESP QL NAA+PROBE: NOT DETECTED
SODIUM SERPL-SCNC: 141 MMOL/L (ref 136–145)
WBC # BLD AUTO: 12.49 K/UL (ref 3.9–12.7)

## 2023-04-28 PROCEDURE — 25000003 PHARM REV CODE 250: Performed by: INTERNAL MEDICINE

## 2023-04-28 PROCEDURE — 25000003 PHARM REV CODE 250

## 2023-04-28 PROCEDURE — 85025 COMPLETE CBC W/AUTO DIFF WBC: CPT | Performed by: STUDENT IN AN ORGANIZED HEALTH CARE EDUCATION/TRAINING PROGRAM

## 2023-04-28 PROCEDURE — 97110 THERAPEUTIC EXERCISES: CPT | Mod: CQ

## 2023-04-28 PROCEDURE — 63600175 PHARM REV CODE 636 W HCPCS

## 2023-04-28 PROCEDURE — 99232 SBSQ HOSP IP/OBS MODERATE 35: CPT | Mod: ,,, | Performed by: INTERNAL MEDICINE

## 2023-04-28 PROCEDURE — 25000003 PHARM REV CODE 250: Performed by: STUDENT IN AN ORGANIZED HEALTH CARE EDUCATION/TRAINING PROGRAM

## 2023-04-28 PROCEDURE — 11000001 HC ACUTE MED/SURG PRIVATE ROOM

## 2023-04-28 PROCEDURE — 99233 SBSQ HOSP IP/OBS HIGH 50: CPT | Mod: GC,,, | Performed by: STUDENT IN AN ORGANIZED HEALTH CARE EDUCATION/TRAINING PROGRAM

## 2023-04-28 PROCEDURE — 97535 SELF CARE MNGMENT TRAINING: CPT

## 2023-04-28 PROCEDURE — U0005 INFEC AGEN DETEC AMPLI PROBE: HCPCS

## 2023-04-28 PROCEDURE — 99232 PR SUBSEQUENT HOSPITAL CARE,LEVL II: ICD-10-PCS | Mod: ,,, | Performed by: INTERNAL MEDICINE

## 2023-04-28 PROCEDURE — 97530 THERAPEUTIC ACTIVITIES: CPT | Mod: CQ

## 2023-04-28 PROCEDURE — 80053 COMPREHEN METABOLIC PANEL: CPT | Performed by: STUDENT IN AN ORGANIZED HEALTH CARE EDUCATION/TRAINING PROGRAM

## 2023-04-28 PROCEDURE — 83735 ASSAY OF MAGNESIUM: CPT | Performed by: STUDENT IN AN ORGANIZED HEALTH CARE EDUCATION/TRAINING PROGRAM

## 2023-04-28 PROCEDURE — 92526 ORAL FUNCTION THERAPY: CPT

## 2023-04-28 PROCEDURE — 84100 ASSAY OF PHOSPHORUS: CPT | Performed by: STUDENT IN AN ORGANIZED HEALTH CARE EDUCATION/TRAINING PROGRAM

## 2023-04-28 PROCEDURE — 36415 COLL VENOUS BLD VENIPUNCTURE: CPT | Performed by: STUDENT IN AN ORGANIZED HEALTH CARE EDUCATION/TRAINING PROGRAM

## 2023-04-28 PROCEDURE — 99233 PR SUBSEQUENT HOSPITAL CARE,LEVL III: ICD-10-PCS | Mod: GC,,, | Performed by: STUDENT IN AN ORGANIZED HEALTH CARE EDUCATION/TRAINING PROGRAM

## 2023-04-28 RX ORDER — POTASSIUM CHLORIDE 20 MEQ/1
40 TABLET, EXTENDED RELEASE ORAL
Status: COMPLETED | OUTPATIENT
Start: 2023-04-28 | End: 2023-04-28

## 2023-04-28 RX ORDER — PANTOPRAZOLE SODIUM 40 MG/1
40 TABLET, DELAYED RELEASE ORAL DAILY
Status: DISCONTINUED | OUTPATIENT
Start: 2023-04-29 | End: 2023-04-30

## 2023-04-28 RX ORDER — ACETAMINOPHEN 500 MG
1000 TABLET ORAL EVERY 8 HOURS
Status: DISCONTINUED | OUTPATIENT
Start: 2023-04-28 | End: 2023-05-05 | Stop reason: HOSPADM

## 2023-04-28 RX ADMIN — MICAFUNGIN SODIUM 100 MG: 100 INJECTION, POWDER, LYOPHILIZED, FOR SOLUTION INTRAVENOUS at 08:04

## 2023-04-28 RX ADMIN — ACETAMINOPHEN 1000 MG: 500 TABLET ORAL at 02:04

## 2023-04-28 RX ADMIN — INSULIN ASPART 2 UNITS: 100 INJECTION, SOLUTION INTRAVENOUS; SUBCUTANEOUS at 12:04

## 2023-04-28 RX ADMIN — Medication: at 09:04

## 2023-04-28 RX ADMIN — POTASSIUM CHLORIDE 40 MEQ: 1500 TABLET, EXTENDED RELEASE ORAL at 11:04

## 2023-04-28 RX ADMIN — POTASSIUM CHLORIDE 40 MEQ: 1500 TABLET, EXTENDED RELEASE ORAL at 01:04

## 2023-04-28 RX ADMIN — CARVEDILOL 12.5 MG: 12.5 TABLET, FILM COATED ORAL at 08:04

## 2023-04-28 RX ADMIN — PANTOPRAZOLE SODIUM 40 MG: 40 TABLET, DELAYED RELEASE ORAL at 05:04

## 2023-04-28 RX ADMIN — ACETAMINOPHEN 1000 MG: 500 TABLET ORAL at 10:04

## 2023-04-28 RX ADMIN — DULOXETINE 30 MG: 30 CAPSULE, DELAYED RELEASE ORAL at 08:04

## 2023-04-28 RX ADMIN — DULOXETINE 30 MG: 30 CAPSULE, DELAYED RELEASE ORAL at 10:04

## 2023-04-28 RX ADMIN — OXYCODONE HYDROCHLORIDE 5 MG: 5 TABLET ORAL at 10:04

## 2023-04-28 RX ADMIN — COLLAGENASE SANTYL: 250 OINTMENT TOPICAL at 10:04

## 2023-04-28 RX ADMIN — PIPERACILLIN SODIUM AND TAZOBACTAM SODIUM 4.5 G: 4; .5 INJECTION, POWDER, FOR SOLUTION INTRAVENOUS at 03:04

## 2023-04-28 RX ADMIN — OXYCODONE HYDROCHLORIDE 5 MG: 5 TABLET ORAL at 09:04

## 2023-04-28 RX ADMIN — AMLODIPINE BESYLATE 10 MG: 10 TABLET ORAL at 08:04

## 2023-04-28 RX ADMIN — ACETAMINOPHEN 1000 MG: 500 TABLET ORAL at 06:04

## 2023-04-28 RX ADMIN — ATORVASTATIN CALCIUM 40 MG: 40 TABLET, FILM COATED ORAL at 10:04

## 2023-04-28 RX ADMIN — CARVEDILOL 12.5 MG: 12.5 TABLET, FILM COATED ORAL at 10:04

## 2023-04-28 NOTE — PROGRESS NOTES
[facility]  Progress Note    Patient Name: Nelsy Marino  MRN: [id number]  Patient Class: IP- Inpatient   Admission Date: 4/20/2023  Length of Stay: 6 days  Attending Physician: Shadi Vidal MD  Primary Care Provider: Juan F Garay        Subjective:     Principal Problem:LOLY (acute kidney injury)        HPI:  This is an 82-year-old female with a past medical history of severe PAD, hypertension, type 2 diabetes, HFpEF (EF 65%), L femoral bypass and L leg flap, R toe ampuations 2 months prior with presenting after  noted waxing/waning confusion since her discharge from hospital two weeks ago. History largely provided by  at bedside as patient is intermittently somnolent. She was discharged on IV Cefepime and Micafungin for her R foot amputation and patient reports adherence with regimen at home. He grew concerned that int he past two weeks she had worsening LLE pain and darkened skin, which is what happened before her R toes were amputated. His niece contacted a nurse who recommneded ED evaluation. No fevers, chills, HA, chest pain, SOB, N/V/D, abdominal pain.    Pt receives  nurse aide, PT/OT since hospital DC and ID very recently made a referral to podiatry and wound care, but appointments not yet made.     In the ED patient was hemodynamically stable and hypertensive, intermittently on RA to 2L NC. Leukocytosis to 13, Hgb 7.5, Cr 2.1, Albumin 2.1, BCx in process, CT head and CXR wnl. Started on BS Abx with continued Cefepime, [place], started on Vanc pending Bcx, US LE arterial with diminished flow throughout the left lower extremity. Monophasic waveforms suggesting atherosclerotic disease. Admitted to  for LOLY and Podiatry/Wound care.       Overview/Hospital Course:  Admitted to hospital medicine for LOLY and left foot pain. Cr on admission 2.1 (bl.8-1.5), improved with IVF initially. After holding IVF Cr increased, and then unresponsive to fluids. Patient with  significant urine retention, rivas placed and Cr again increased. Consulted Nephrology for assistance, suspect due to acute drop in Hgb. LOLY improved 4/27. Nephrology recommended heme/onc evaluation for this patient regarding the three monoclonal bands seen on SPE. Spoke with heme/onc regarding case, labs ordered in anticipation of outpatient evaluation given that patient stable. Patient having increased soft bowel movements with melena 4/25. GI consulted performed EGD with small bowel follow through 4/26 revealed red blood in the gastric body with three bleeding angioectasias in the stomach treated with APC. DAPT held in setting of acute bleed. Discussed concern of rebleeding AVM possibly due to DAPT with vascular.    Vascular surgery consulted on admission, recommended repeat ARCHIE and left foot arterial doppler. Podiatry also saw the patient for left foot wound, recommended wound vac placement. Both Vascular and Podiatry felt no further intervention necessary at this time.     ID consulted, patient was receiving cefepime and micafungin outpatient per ID following R foot transmetatarsal amputation. Started on vanc, cefepime, and micafungin. Patient more lethargic 4/22 although able to follow commands and move extremities on exam. With concern for cefepime toxicity, ID removed cefepime and vancomycin. Micafungin and zosyn continued. EEG and CTH ordered, no acute abnormalities. Patient significantly improved following d/c Cefepime to baseline. Patient to continue zosyn and micafungin until ID follow up 5/17 and then continue keflex ppx thereafter per ID.    SLP consulted recommended pureed dysphagia diet. PT/OT recommended SNF placement. Social work assisting with placement.      Interval History: LOLY improving. Will discuss plan to resume DAPT with vascular surgery. SW trying to find SNF placement     Review of Systems   Constitutional:  Negative for activity change, appetite change and fever.   HENT:  Negative for  congestion and sore throat.    Respiratory:  Negative for cough, shortness of breath and wheezing.    Cardiovascular:  Negative for chest pain and leg swelling.   Gastrointestinal:  Negative for abdominal pain, constipation, diarrhea, nausea and vomiting.   Genitourinary:  Negative for difficulty urinating and dysuria.   Musculoskeletal:  Positive for arthralgias and myalgias.   Skin:  Positive for color change and wound. Negative for rash.   Allergic/Immunologic: Negative.    Neurological:  Negative for dizziness and headaches.   Hematological: Negative.    Objective:     Vital Signs (Most Recent):  Temp: 98.2 °F (36.8 °C) (04/28/23 0501)  Pulse: 91 (04/28/23 0501)  Resp: 16 (04/28/23 0914)  BP: (!) 172/73 (04/28/23 0501)  SpO2: (!) 91 % (04/28/23 0501)   Vital Signs (24h Range):  Temp:  [98.1 °F (36.7 °C)-98.4 °F (36.9 °C)] 98.2 °F (36.8 °C)  Pulse:  [83-91] 91  Resp:  [16-20] 16  SpO2:  [91 %-96 %] 91 %  BP: (111-172)/(52-73) 172/73     Weight: 68 kg (149 lb 14.6 oz)  Body mass index is 24.95 kg/m².    Intake/Output Summary (Last 24 hours) at 4/28/2023 1151  Last data filed at 4/28/2023 0800  Gross per 24 hour   Intake --   Output 300 ml   Net -300 ml        Physical Exam  Vitals and nursing note reviewed.   Constitutional:       General: She is not in acute distress.     Appearance: She is not ill-appearing.   HENT:      Head: Normocephalic and atraumatic.      Mouth/Throat:      Mouth: Mucous membranes are moist.      Pharynx: Oropharynx is clear.   Eyes:      Extraocular Movements: Extraocular movements intact.      Conjunctiva/sclera: Conjunctivae normal.   Cardiovascular:      Rate and Rhythm: Normal rate and regular rhythm.      Pulses: Normal pulses.   Pulmonary:      Effort: Pulmonary effort is normal. No respiratory distress.      Breath sounds: No wheezing or rales.   Abdominal:      Palpations: Abdomen is soft.      Tenderness: There is no abdominal tenderness. There is no guarding.   Musculoskeletal:       Cervical back: Normal range of motion and neck supple.      Right lower leg: No edema.      Left lower leg: No edema.   Skin:     General: Skin is warm.      Findings: Lesion (R heel, L dorsum with darkened skin) present.      Comments: Insicion on right inner thigh- c/d/I- no drainage or erythema seen.  Wound on left thigh- with dressing on top and drain in place with serosanguinous fluid.    Neurological:      General: No focal deficit present.      Mental Status: She is oriented to person, place, and time. Mental status is at baseline.   Psychiatric:         Mood and Affect: Mood normal.         Behavior: Behavior normal.         Thought Content: Thought content normal.         Judgment: Judgment normal.       Significant Labs: All pertinent labs within the past 24 hours have been reviewed.  CBC:   Recent Labs   Lab 04/27/23  0332 04/27/23  1554 04/28/23  0819   WBC 16.30* 15.04* 12.49   HGB 7.2* 7.1* 7.1*   HCT 23.3* 23.3* 23.2*    223 209       CMP:   Recent Labs   Lab 04/27/23  0332 04/28/23  0819    141   K 3.6 3.2*    107   CO2 24 22*   * 149*   BUN 43* 36*   CREATININE 2.4* 2.3*   CALCIUM 8.9 9.0   PROT 6.6 6.8   ALBUMIN 1.9* 1.9*   BILITOT 0.4 0.4   ALKPHOS 58 56   AST 13 13   ALT <5* 5*   ANIONGAP 11 12         Significant Imaging: I have reviewed all pertinent imaging results/findings within the past 24 hours.      Assessment/Plan:      * LOLY (acute kidney injury)  Patient with acute kidney injury likely due to IVVD/dehydration LOLY is currently worsening. Labs reviewed- Renal function/electrolytes with Estimated Creatinine Clearance: 17 mL/min (A) (based on SCr of 2.3 mg/dL (H)). according to latest data. Monitor urine output and serial BMP and adjust therapy as needed. Avoid nephrotoxins and renally dose meds for GFR listed above.     Cr 2.1 (baseline 0.8-1.5).  Patient dry on physical exam.  Poor oral intake, thought to be prerenal in origin initially. Possible due to  acute on chronic anemia.    Recent Labs   Lab 04/27/23  0332 04/28/23  0819    141   K 3.6 3.2*    107   CO2 24 22*   BUN 43* 36*   CREATININE 2.4* 2.3*     - Nephrology consulted; appreciate assistance   - Will refer to heme/onc on discharge for further workup of three monoclonal bands seen on the SPE  - Paniagua placed after patient retaining on bladder scan 4/23 with hematuria 4/24  - RP u/s no hydronephrosis  - Urine lytes showed possible intrinsic etiology  - CMP qd, trend Cr  - strict I/O  - daily weights  - renally dose all medications  - avoid nephrotoxic agents, NSAIDs, IV contrast, ACE/ARB      Debility  - PT/OT reconsulted  - Fall precautions  - Established with HH PT/OT/nurse aide prior to hospitalization  - SW assisting in SNF placement       Skin ulcer of right heel, limited to breakdown of skin  See PVD for plan    - Wound care and podiatry consulted with appreciated mgmt recs      Acute on chronic anemia  Baseline 8.6-11.   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  Patient continues to have melena and requiring blood transfusion    GI consulted push enteroscopy 4/26 showed red blood in the gastric body and three bleeding angioectasias in the stomach treated with APC    - Continue PPI daily  - Maintain 2 large bore Ivs for volume resuscitation  - Trend CBC BID. Transfuse pRBC for Hb < 7 g/dL  - Stopped anticoagulation for now        Acute encephalopathy  A&O x3 on admission with waxing/waning per  at the  bedside.  says she has been largely bed bound since discharge, but her waxing confusion was concerning to him. Patients mentation significantly improved after stopping cefepime.  Resolved; likely 2/2 cefepime neurotoxicity   - Repeat CT Head with no acute change  - EEG without seizures  - TSH, B12, B1, Vit E, Ammonia, RPR were unremarkable  - Cefepime changed to Zosyn  - F/u blood and Urine cultures      S/P transmetatarsal amputation of foot, right  Appreciated evaluation by Podiatry      Diastolic CHF, chronic  Patient is identified as having Diastolic (HFpEF) heart failure that is Chronic. CHF is currently controlled. Latest ECHO performed and demonstrates- Results for orders placed during the hospital encounter of 02/27/23    Echo    Interpretation Summary  · The estimated ejection fraction is 65%.  · The left ventricle is normal in size with normal systolic function.  · Grade II left ventricular diastolic dysfunction.  · Normal right ventricular size with normal right ventricular systolic function.  · Mild left atrial enlargement.  · Mild-to-moderate mitral regurgitation.  · There is pulmonary hypertension.  · The estimated PA systolic pressure is 55 mmHg.  · Intermediate central venous pressure (8 mmHg).  . Continue Beta Blocker and Furosemide and monitor clinical status closely. Monitor on telemetry. Patient is off CHF pathway.  Monitor strict Is&Os and daily weights.  Place on fluid restriction of 1.5 L. Continue to stress to patient importance of self efficacy and  on diet for CHF. Last BNP reviewed- and noted below No results for input(s): BNP, BNPTRIAGEBLO in the last 168 hours..    - Holding home Lasix in setting of LOLY  - Holding Hyzaar (HCTZ) in setting of LOLY  - Patient breathing comfortably on RA, no signs of volume overload      Type 2 diabetes mellitus with chronic kidney disease, with long-term current use of insulin  Patient's FSGs are uncontrolled due to hyperglycemia on current  medication regimen.  Last A1c reviewed-   Lab Results   Component Value Date    HGBA1C 8.2 (H) 02/28/2023     Most recent fingerstick glucose reviewed-   Recent Labs   Lab 04/27/23  1532 04/27/23  2031 04/28/23  0724   POCTGLUCOSE 189* 188* 157*     Current correctional scale  Low  Maintain anti-hyperglycemic dose as follows-   Antihyperglycemics (From admission, onward)    Start     Stop Route Frequency Ordered    04/21/23 0355  insulin aspart U-100 pen 0-5 Units         -- SubQ Before meals & nightly PRN 04/21/23 0255        Hold Oral hypoglycemics while patient is in the hospital.    Essential hypertension  - Continue home Coreg, amlodipine  - hold Hyzaar in setting of LOLY      PAD (peripheral artery disease)  Recently discharged home with IV Cefepime 2g Q12 hours + IV Micafungin 100mg Q24 hours for estimated end of therapy date of 5/16/2023. For R toe amputation. Patient's  reports adherence with IV Abx. L foot with darkening skin changes, which alarmed  as this was how skin presented prior to toe amputations of R foot. RLE warm to touch with L US Lower Extremity Arteries demonstrating diminished flow throughout the left lower extremity. Monophasic waveforms suggesting atherosclerotic disease.     - Podiatry and Vascular Surgery following; appreciate recs  - Will discuss plan to resume  ASA, Plavix with Vascular Surgery  - Continue Micafungin and Zosyn per prior ID recs until 5/17 to then continue Keflex ppx per ID thereafter for graft infection      VTE Risk Mitigation (From admission, onward)         Ordered     IP VTE HIGH RISK PATIENT  Once         04/21/23 0254     Place sequential compression device  Until discontinued         04/21/23 0254                Discharge Planning   JUAN ALBERTO: 4/28/2023     Code Status: Full Code   Is the patient medically ready for discharge?: No    Reason for patient still in hospital (select all that apply): Patient trending condition and Pending disposition  Discharge  Plan A: Skilled Nursing Facility                  Boris Mckeon DO  Department of Hospital Medicine   [facility]

## 2023-04-28 NOTE — ASSESSMENT & PLAN NOTE
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  Patient continues to have melena and requiring blood transfusion    GI consulted push enteroscopy 4/26 showed red blood in the gastric body and three bleeding angioectasias in the stomach treated with APC    - Continue PPI daily  - Maintain 2 large bore Ivs for volume resuscitation  - Trend CBC BID. Transfuse pRBC for Hb < 7 g/dL  - Stopped anticoagulation for now

## 2023-04-28 NOTE — ASSESSMENT & PLAN NOTE
Patient's FSGs are uncontrolled due to hyperglycemia on current medication regimen.  Last A1c reviewed-   Lab Results   Component Value Date    HGBA1C 8.2 (H) 02/28/2023     Most recent fingerstick glucose reviewed-   Recent Labs   Lab 04/27/23  1532 04/27/23 2031 04/28/23  0724   POCTGLUCOSE 189* 188* 157*     Current correctional scale  Low  Maintain anti-hyperglycemic dose as follows-   Antihyperglycemics (From admission, onward)    Start     Stop Route Frequency Ordered    04/21/23 0355  insulin aspart U-100 pen 0-5 Units         -- SubQ Before meals & nightly PRN 04/21/23 0255        Hold Oral hypoglycemics while patient is in the hospital.

## 2023-04-28 NOTE — ASSESSMENT & PLAN NOTE
Ms Marino is a 83yo lady being treated for PAD associated with acute left foot pain and left foot skin changes. 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 Podiatry consulted for foot wounds with no new findings of infection. Pt presented with elevated creatinine concerning for LOLY, baseline previously < 1.0. GFR with Creatinine Clearance at 23% and 2.1mg/dL on 4/20/23 at 2059. 2L of IV LR infused from 0921on 4/21/23 to 0920 on 4/22/23. GFR and Creatinine Clearance increased as expected to 32% and 1.6mg/dL on 4/21/23 at 0400. Paniagua in place to r/o retention complications. No reported dysuria prior to admission and negative for UTI on admission. Retroperitoneal US was negative for hydronephrosis but elevated bilateral resistive indices concerning for medical renal disease. Progressive encephalopathy likely associated with neurotoxicity from cefepime therapy, held on 4/22/23. Switched to Zosyn and Micafungin for continued infectious coverage. ID consulted for continued antibiotic stewardship. Decreased Hgb of 6.6 on 4/22/23 concerning for GIB. One unit of pRBCs added to improve Hgb to 8.2. GFR with Creatinine Clearance at 18.7% and Cr of 2.5mg/dL on 4/24/23 despite fluid resuscitation. Urine analysis with reflex significant for >100 WBCs with moderate bacteria, cultures negative. Labs elevated for urine protein/creatinine (1.18), urine protein (105), serum protein (5.9) serum kappa/lambda light chains (13.76/8.39). Proteinuria nonspecific and likely secondary to LOLY. Three monoclonal peaks noted on SPE. Multiple loose, lex-colored, BMs noted on 4/25/23 and 4/26/23. Pt taken for EGD with findings of three small angioectasias with bleeding in the gastric body. Bleeding resolved with argon plasma coagulation. Creatinine decreased from 2.4 to 2.3 on 4/28/23.     Recommendations:  --Trend CBC and Hgb in setting of GIB   --Transfuse pRBCs for Hgb < 7.0 (<8.0 for active  bleed)   --IV fluid resuscitation PRN  --Renal diet  --Monitor I/Os for fluid status  --Trend/correct electrolytes  --Avoid nephrotoxic agents (holding home Hyzaar and Lasix)  --Outpatient referral to Nephrology Clinic on discharge  --Outpatient referral to Heme/Onc for monoclonal peaks observed on SPE.

## 2023-04-28 NOTE — ASSESSMENT & PLAN NOTE
Nutrition Problem:  Moderate Protein-Calorie Malnutrition  Malnutrition in the context of Acute Illness/Injury    Related to (etiology):  Inability to consume sufficient energy     Signs and Symptoms (as evidenced by):  Body Fat Depletion: mild and moderate depletion of orbitals   Muscle Mass Depletion: mild and moderate depletion of temples and clavicle region   Weight Loss: 10% x 6 months    Interventions(treatment strategy):  Collaboration of nutrition care w/ other providers  ONS    Nutrition Diagnosis Status:  New

## 2023-04-28 NOTE — ASSESSMENT & PLAN NOTE
Patient with acute kidney injury likely due to IVVD/dehydration LOLY is currently worsening. Labs reviewed- Renal function/electrolytes with Estimated Creatinine Clearance: 17 mL/min (A) (based on SCr of 2.3 mg/dL (H)). according to latest data. Monitor urine output and serial BMP and adjust therapy as needed. Avoid nephrotoxins and renally dose meds for GFR listed above.     Cr 2.1 (baseline 0.8-1.5).  Patient dry on physical exam.  Poor oral intake, thought to be prerenal in origin initially. Possible due to acute on chronic anemia.    Recent Labs   Lab 04/27/23  0332 04/28/23  0819    141   K 3.6 3.2*    107   CO2 24 22*   BUN 43* 36*   CREATININE 2.4* 2.3*     - Nephrology consulted; appreciate assistance   - Will refer to heme/onc on discharge for further workup of three monoclonal bands seen on the SPE  - Paniagua placed after patient retaining on bladder scan 4/23 with hematuria 4/24  - RP u/s no hydronephrosis  - Urine lytes showed possible intrinsic etiology  - CMP qd, trend Cr  - strict I/O  - daily weights  - renally dose all medications  - avoid nephrotoxic agents, NSAIDs, IV contrast, ACE/ARB

## 2023-04-28 NOTE — PT/OT/SLP PROGRESS
"Physical Therapy Treatment    Patient Name:  Nelsy Marino   MRN:  [id number]    Recommendations:     Discharge Recommendations: nursing facility, skilled  Discharge Equipment Recommendations: none  Barriers to discharge: Inaccessible home    Assessment:     Nelsy Marino is a 82 y.o. female admitted with a medical diagnosis of LOLY (acute kidney injury).  She presents with the following impairments/functional limitations: weakness, impaired endurance, impaired functional mobility, gait instability, decreased coordination, pain, decreased safety awareness, decreased lower extremity function.  Pt was alert and participate in therapy well. Pt required less assistance level today and completed 2 stands from EOB. Pt is progressing well towards goals. Pt continues to benefit from therapy to improve functional endurance and strength.    Rehab Prognosis: Good; patient would benefit from acute skilled PT services to address these deficits and reach maximum level of function.    Recent Surgery: Procedure(s) (LRB):  EGD (ESOPHAGOGASTRODUODENOSCOPY) (N/A) 2 Days Post-Op    Plan:     During this hospitalization, patient to be seen 3 x/week to address the identified rehab impairments via therapeutic activities, therapeutic exercises, neuromuscular re-education and progress toward the following goals:    Plan of Care Expires:  05/20/23    Subjective     Chief Complaint: none verbalized   Patient/Family Comments/goals: "I haven't gotten up in a while"  Pain/Comfort:  Pain Rating 1: 0/10  Pain Rating Post-Intervention 1: 0/10      Objective:     Communicated with RN prior to session.  Patient found HOB elevated with wound vac, WILTON drain, telemetry, PureWick upon PT entry to room.     General Precautions: Standard, fall  Orthopedic Precautions:  (BLE WBAT in surgical shoes)  Braces:  (Darco shoe)  Respiratory Status: Room air     Functional Mobility:  Additional staff present: Rehab Tech  Bed Mobility:   Scooting   to HOB: via supine " bridge: minimum assistance  to EOB: minimum assistance  Supine to Sit: minimum assistance; HOB elevated  Cues to technique and sequencing   Assisted with trunk and LE management   Sit to Supine: minimum assistance  Assisted with trunk and LE management   Transfers:   Sit <> Stand Transfer: moderate assistance and of 2 persons with rolling walker   2 trials from EOB, 15 seconds and 1 min  B Darco shoe donned prior to standing.   Balance:   Static/Dynamic sitting EOB balance: CGA with BUE support   Donned DARCO shoe   Static standing balance: modA-Nilda  2 trials,   1st modA for ~15 secs with knee buckling   2nd Nilda for ~1 min, no buckling noted    AM-PAC 6 CLICK MOBILITY  Turning over in bed (including adjusting bedclothes, sheets and blankets)?: 3  Sitting down on and standing up from a chair with arms (e.g., wheelchair, bedside commode, etc.): 2  Moving from lying on back to sitting on the side of the bed?: 3  Moving to and from a bed to a chair (including a wheelchair)?: 1  Need to walk in hospital room?: 1  Climbing 3-5 steps with a railing?: 1  Basic Mobility Total Score: 11     Treatment & Education:  Seated BLE therex 2x 10 reps: long arc quads and marches  AAROM with AP on BLE  Patient educated on role of therapy, goals of session, and benefits of out of bed mobility.   Instructed on use of call button and importance of calling nursing staff for assistance with mobility   Questions/concerns addressed within PTA scope of practice  Pt verbalized understanding.    Patient left supine with call button in reach and family present..    GOALS:   Multidisciplinary Problems       Physical Therapy Goals          Problem: Physical Therapy    Goal Priority Disciplines Outcome Goal Variances Interventions   Physical Therapy Goal     PT, PT/OT Ongoing, Progressing     Description: Goals to be met by: 5/10/23     Patient will increase functional independence with mobility by performin. Supine to sit with Moderate  Assistance  2. Sit to stand transfer with Moderate Assistance  3. Bed to chair transfer with Moderate Assistance using AD if needed and Darco shoe  4. Sitting at edge of bed x10 minutes with Minimal Assistance to perform functional mobility.                          Time Tracking:     PT Received On: 04/28/23  PT Start Time: 1509     PT Stop Time: 1536  PT Total Time (min): 27 min     Billable Minutes: Therapeutic Activity 14 and Therapeutic Exercise 13    Treatment Type: Treatment  PT/PTA: PTA     Number of PTA visits since last PT visit: 1 04/28/2023

## 2023-04-28 NOTE — PROGRESS NOTES
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 This was associated with progressive confusion. She was admitted to  service where she was continued on cefepime and micafungin, later changed to zosyn and micafungin to rule out cefepime toxicity. Confusion has worsened in the hospital since admission 2 days ago with ongoing pain of her feet. Vascular surgery consulted, recommending repeat ARCHIE and toe pressures. Head CT on 4/22/23 negative for acute findings. EEG ordered. Patient transitioned to Zosyn and micafungin. LOLY noted at admission, improved initially with fluids but worsened again on 4/23/23, likely from poor oral intake. Fluids reinitiated and rivas placed, no additional increase in Cr noted and Nephrology was consulted for assessment and management of LOLY on CKD.        Interval History:   No acute events overnight. Urine output at 300ml with creatinine improved from 2.4 to 2.3.    Review of patient's allergies indicates:   Allergen Reactions    Motrin [ibuprofen] Itching     Current Facility-Administered Medications   Medication Frequency    acetaminophen tablet 1,000 mg Q8H    amLODIPine tablet 10 mg Daily    ammonium lactate 12 % lotion Daily    atorvastatin tablet 40 mg QHS    carvediloL tablet 12.5 mg BID    collagenase ointment Daily    dextrose 10% bolus 125 mL 125 mL PRN    dextrose  10% bolus 250 mL 250 mL PRN    dextrose 40 % gel 15,000 mg PRN    dextrose 40 % gel 30,000 mg PRN    DULoxetine DR capsule 30 mg BID    glucagon (human recombinant) injection 1 mg PRN    insulin aspart U-100 pen 0-5 Units QID (AC + HS) PRN    loperamide capsule 2 mg QID PRN    micafungin 100 mg in sodium chloride 0.9 % 100 mL IVPB (MB+) Q24H    naloxone 0.4 mg/mL injection 0.02 mg PRN    ondansetron injection 4 mg Q6H PRN    oxyCODONE immediate release tablet 5 mg Q4H PRN    pantoprazole EC tablet 40 mg BID AC    piperacillin-tazobactam (ZOSYN) 4.5 g in dextrose 5 % in water (D5W) 5 % 100 mL IVPB (MB+) Q12H    sodium chloride 0.9% flush 10 mL Q12H PRN    vits A and D-white pet-lanolin ointment PRN       Objective:     Review of Systems   Constitutional:  Positive for malaise/fatigue. Negative for chills and fever.   HENT:  Negative for congestion, hearing loss, sinus pain and sore throat.    Eyes:  Negative for blurred vision, photophobia and redness.   Respiratory:  Negative for cough, shortness of breath and wheezing.    Cardiovascular:  Negative for chest pain, palpitations and leg swelling.   Gastrointestinal:  Negative for blood in stool, constipation, diarrhea, nausea and vomiting.   Genitourinary:  Negative for dysuria and flank pain.   Musculoskeletal:  Positive for myalgias. Negative for falls and joint pain.   Skin:  Negative for itching and rash.   Neurological:  Negative for dizziness, tingling, weakness and headaches.   Psychiatric/Behavioral:  Negative for depression. The patient is not nervous/anxious.        Vital Signs (Most Recent):  Temp: 98.2 °F (36.8 °C) (04/28/23 0501)  Pulse: 91 (04/28/23 0501)  Resp: 16 (04/28/23 0914)  BP: (Abnormal) 172/73 (04/28/23 0501)  SpO2: (Abnormal) 91 % (04/28/23 0501)   Vital Signs (24h Range):  Temp:  [98.1 °F (36.7 °C)-98.4 °F (36.9 °C)] 98.2 °F (36.8 °C)  Pulse:  [83-91] 91  Resp:  [16-20] 16  SpO2:  [91 %-96 %] 91 %  BP: (111-172)/(52-73) 172/73      Weight: 68 kg (150 lb) (04/20/23 2035)  Body mass index is 24.96 kg/m².  Body surface area is 1.77 meters squared.    I/O last 3 completed shifts:  In: 200 [P.O.:200]  Out: 300 [Urine:300]    Physical Exam  Vitals and nursing note reviewed.   Constitutional:       General: She is not in acute distress.     Appearance: She is not ill-appearing.   HENT:      Head: Normocephalic and atraumatic.      Right Ear: External ear normal.      Left Ear: External ear normal.      Nose: No congestion.      Mouth/Throat:      Pharynx: Oropharynx is clear.   Eyes:      General:         Right eye: No discharge.         Left eye: No discharge.      Extraocular Movements: Extraocular movements intact.      Conjunctiva/sclera: Conjunctivae normal.   Cardiovascular:      Rate and Rhythm: Normal rate and regular rhythm.      Pulses: Normal pulses.      Heart sounds: Normal heart sounds.   Pulmonary:      Effort: Pulmonary effort is normal. No respiratory distress.      Breath sounds: Normal breath sounds. No wheezing or rales.   Abdominal:      Palpations: Abdomen is soft.      Tenderness: There is no abdominal tenderness. There is no guarding.   Musculoskeletal:      Cervical back: Neck supple. No tenderness.      Right lower leg: No edema.      Left lower leg: No edema.   Skin:     General: Skin is warm.      Findings: Lesion present.      Comments: Dark skin discoloration over the dorsal aspect of the left foot. 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   Neurological:      Mental Status: Mental status is at baseline.   Psychiatric:         Attention and Perception: Attention normal.         Mood and Affect: Mood and affect normal.         Speech: Speech normal.         Behavior: Behavior is cooperative.       Significant Labs:  CMP:   Recent Labs   Lab 04/28/23  0819   *   CALCIUM 9.0   ALBUMIN 1.9*   PROT 6.8      K 3.2*   CO2 22*      BUN 36*   CREATININE 2.3*   ALKPHOS 56   ALT  5*   AST 13   BILITOT 0.4     All labs within the past 24 hours have been reviewed.         Assessment/Plan:     Renal/  * LOLY (acute kidney injury)  Ms Marino is a 81yo lady being treated for PAD associated with acute left foot pain and left foot skin changes. 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 Podiatry consulted for foot wounds with no new findings of infection. Pt presented with elevated creatinine concerning for LOLY, baseline previously < 1.0. GFR with Creatinine Clearance at 23% and 2.1mg/dL on 4/20/23 at 2059. 2L of IV LR infused from 0921on 4/21/23 to 0920 on 4/22/23. GFR and Creatinine Clearance increased as expected to 32% and 1.6mg/dL on 4/21/23 at 0400. [place] in place to r/o retention complications. No reported dysuria prior to admission and negative for UTI on admission. Retroperitoneal US was negative for hydronephrosis but elevated bilateral resistive indices concerning for medical renal disease. Progressive encephalopathy likely associated with neurotoxicity from cefepime therapy, held on 4/22/23. Switched to Zosyn and Micafungin for continued infectious coverage. ID consulted for continued antibiotic stewardship. Decreased Hgb of 6.6 on 4/22/23 concerning for GIB. One unit of pRBCs added to improve Hgb to 8.2. GFR with Creatinine Clearance at 18.7% and Cr of 2.5mg/dL on 4/24/23 despite fluid resuscitation. Urine analysis with reflex significant for >100 WBCs with moderate bacteria, cultures negative. Labs elevated for urine protein/creatinine (1.18), urine protein (105), serum protein (5.9) serum kappa/lambda light chains (13.76/8.39). Proteinuria nonspecific and likely secondary to LOLY. Three monoclonal peaks noted on SPE. Multiple loose, lex-colored, BMs noted on 4/25/23 and 4/26/23. Pt taken for EGD with findings of three small angioectasias with bleeding in the gastric body. Bleeding resolved with argon plasma coagulation.  Creatinine decreased from 2.4 to 2.3 on 4/28/23.     Recommendations:  --Trend CBC and Hgb in setting of GIB   --Transfuse pRBCs for Hgb < 7.0 (<8.0 for active bleed)   --IV fluid resuscitation PRN  --Renal diet  --Monitor I/Os for fluid status  --Trend/correct electrolytes  --Avoid nephrotoxic agents (holding home Hyzaar and Lasix)  --Outpatient referral to Nephrology Clinic on discharge  --Outpatient referral to Heme/Onc for monoclonal peaks observed on SPE.        Thank you for your consult. I will sign off. Please contact us if you have any additional questions.    Flip Priest MD  Nephrology  [facility]

## 2023-04-28 NOTE — PROGRESS NOTES
[facility] - Med S[facility]  Adult Nutrition  Progress Note    SUMMARY       Recommendations    Continue Diabetic diet + ONS. Encourage PO intake as tolerated.  RD to monitor & follow-up.    Goals: Meet % of EEN/EPN by RD f/u date  Nutrition Goal Status: progressing towards goal  Communication of RD Recs: reviewed with RN    Assessment and Plan    Moderate malnutrition    Nutrition Problem:  Moderate Protein-Calorie Malnutrition  Malnutrition in the context of Acute Illness/Injury    Related to (etiology):  Inability to consume sufficient energy     Signs and Symptoms (as evidenced by):  Body Fat Depletion: mild and moderate depletion of orbitals   Muscle Mass Depletion: mild and moderate depletion of temples and clavicle region   Weight Loss: 10% x 6 months    Interventions(treatment strategy):  Collaboration of nutrition care w/ other providers  ONS    Nutrition Diagnosis Status:  New    Malnutrition Assessment    Malnutrition Context: acute illness or injury  Malnutrition Level: moderate    Weight Loss (Malnutrition): 10% in 6 months  Subcutaneous Fat (Malnutrition): other (see comments) (Mild-moderate)  Muscle Mass (Malnutrition): other (see comments) (Mild-moderate)     Reason for Assessment    Reason For Assessment: RD follow-up  Diagnosis: other (see comments)  Relevant Medical History: DM, HTN  Interdisciplinary Rounds: did not attend    General Information Comments: Pt resting at time of visit this AM. Per RN documentation, pt tolerating diet (per SLP) + ONS w/ 50% PO intake. Unsure of energy intake PTA; UBW: ~165# per chart review. Mild-moderate wasting noted on (visual) physical exam. RD feels pt meets criteria for moderate malnutrition. Please see PES statement for details.  Nutrition Discharge Planning: Adequate PO intake    Nutrition/Diet History    Spiritual, Cultural Beliefs, Restorationist Practices, Values that Affect Care: no  Food Allergies: NKFA  Factors Affecting Nutritional Intake: decreased  "appetite    Anthropometrics    Temp: 98.2 °F (36.8 °C)  Height Method: Stated  Height: 5' 5" (165.1 cm)  Height (inches): 65 in  Weight Method: Stated  Weight: 68 kg (149 lb 14.6 oz)  Weight (lb): 149.91 lb  Ideal Body Weight (IBW), Female: 125 lb  % Ideal Body Weight, Female (lb): 119.93 %  BMI (Calculated): 24.9  BMI Grade: 18.5-24.9 - normal  Usual Body Weight (UBW), k.5 kg  % Usual Body Weight: 90.25  % Weight Change From Usual Weight: -9.93 %    Lab/Procedures/Meds    Pertinent Labs Reviewed: reviewed  Pertinent Labs Comments: GFR 19.7, Creat 2.4, A1C 8.2  Pertinent Medications Reviewed: reviewed  Pertinent Medications Comments: Statin    Estimated/Assessed Needs    Weight Used For Calorie Calculations: 68 kg (149 lb 14.6 oz)    Energy Calorie Requirements (kcal): 1425 kcal/d  Energy Need Method: [place]-St Jeor (1.25 PAL)    Protein Requirements: 75 g/d (1.1 g/kg)  Weight Used For Protein Calculations: 68 kg (149 lb 14.6 oz)    Estimated Fluid Requirement Method: other (see comments) (Per MD or 1 mL/kcal)  RDA Method (mL): 1425    Nutrition Prescription Ordered    Current Diet Order: 2000 kcal ADA  Oral Nutrition Supplement: Boost Glucose Control BID    Evaluation of Received Nutrient/Fluid Intake    I/O: -2.8L since admit    Comments: LBM:     Tolerance: tolerating    Nutrition Risk    Level of Risk/Frequency of Follow-up: low ((1x/week))     Monitor and Evaluation    Food and Nutrient Intake: energy intake, food and beverage intake  Food and Nutrient Adminstration: diet order  Knowledge/Beliefs/Attitudes: beliefs and attitudes, food and nutrition knowledge/skill  Physical Activity and Function: nutrition-related ADLs and IADLs, factors affecting access to physical activity  Anthropometric Measurements: height/length, weight, weight change, body mass index, growth pattern indices/percentile ranks  Biochemical Data, Medical Tests and Procedures: electrolyte and renal panel, gastrointestinal profile, " glucose/endocrine profile, inflammatory profile, lipid profile  Nutrition-Focused Physical Findings: extremities, muscles and bones, overall appearance, skin, head and eyes     Nutrition Follow-Up    RD Follow-up?: Yes

## 2023-04-28 NOTE — SUBJECTIVE & OBJECTIVE
Interval History: LOLY improving. Will discuss plan to resume DAPT with vascular surgery. SW trying to find SNF placement     Review of Systems   Constitutional:  Negative for activity change, appetite change and fever.   HENT:  Negative for congestion and sore throat.    Respiratory:  Negative for cough, shortness of breath and wheezing.    Cardiovascular:  Negative for chest pain and leg swelling.   Gastrointestinal:  Negative for abdominal pain, constipation, diarrhea, nausea and vomiting.   Genitourinary:  Negative for difficulty urinating and dysuria.   Musculoskeletal:  Positive for arthralgias and myalgias.   Skin:  Positive for color change and wound. Negative for rash.   Allergic/Immunologic: Negative.    Neurological:  Negative for dizziness and headaches.   Hematological: Negative.    Objective:     Vital Signs (Most Recent):  Temp: 98.2 °F (36.8 °C) (04/28/23 0501)  Pulse: 91 (04/28/23 0501)  Resp: 16 (04/28/23 0914)  BP: (!) 172/73 (04/28/23 0501)  SpO2: (!) 91 % (04/28/23 0501)   Vital Signs (24h Range):  Temp:  [98.1 °F (36.7 °C)-98.4 °F (36.9 °C)] 98.2 °F (36.8 °C)  Pulse:  [83-91] 91  Resp:  [16-20] 16  SpO2:  [91 %-96 %] 91 %  BP: (111-172)/(52-73) 172/73     Weight: 68 kg (149 lb 14.6 oz)  Body mass index is 24.95 kg/m².    Intake/Output Summary (Last 24 hours) at 4/28/2023 1151  Last data filed at 4/28/2023 0800  Gross per 24 hour   Intake --   Output 300 ml   Net -300 ml        Physical Exam  Vitals and nursing note reviewed.   Constitutional:       General: She is not in acute distress.     Appearance: She is not ill-appearing.   HENT:      Head: Normocephalic and atraumatic.      Mouth/Throat:      Mouth: Mucous membranes are moist.      Pharynx: Oropharynx is clear.   Eyes:      Extraocular Movements: Extraocular movements intact.      Conjunctiva/sclera: Conjunctivae normal.   Cardiovascular:      Rate and Rhythm: Normal rate and regular rhythm.      Pulses: Normal pulses.   Pulmonary:       Effort: Pulmonary effort is normal. No respiratory distress.      Breath sounds: No wheezing or rales.   Abdominal:      Palpations: Abdomen is soft.      Tenderness: There is no abdominal tenderness. There is no guarding.   Musculoskeletal:      Cervical back: Normal range of motion and neck supple.      Right lower leg: No edema.      Left lower leg: No edema.   Skin:     General: Skin is warm.      Findings: Lesion (R heel, L dorsum with darkened skin) present.      Comments: Insicion on right inner thigh- c/d/I- no drainage or erythema seen.  Wound on left thigh- with dressing on top and drain in place with serosanguinous fluid.    Neurological:      General: No focal deficit present.      Mental Status: She is oriented to person, place, and time. Mental status is at baseline.   Psychiatric:         Mood and Affect: Mood normal.         Behavior: Behavior normal.         Thought Content: Thought content normal.         Judgment: Judgment normal.       Significant Labs: All pertinent labs within the past 24 hours have been reviewed.  CBC:   Recent Labs   Lab 04/27/23  0332 04/27/23  1554 04/28/23  0819   WBC 16.30* 15.04* 12.49   HGB 7.2* 7.1* 7.1*   HCT 23.3* 23.3* 23.2*    223 209       CMP:   Recent Labs   Lab 04/27/23  0332 04/28/23  0819    141   K 3.6 3.2*    107   CO2 24 22*   * 149*   BUN 43* 36*   CREATININE 2.4* 2.3*   CALCIUM 8.9 9.0   PROT 6.6 6.8   ALBUMIN 1.9* 1.9*   BILITOT 0.4 0.4   ALKPHOS 58 56   AST 13 13   ALT <5* 5*   ANIONGAP 11 12         Significant Imaging: I have reviewed all pertinent imaging results/findings within the past 24 hours.

## 2023-04-28 NOTE — PT/OT/SLP PROGRESS
"Speech Language Pathology Treatment/ Discharge note    Patient Name:  Nelsy Marino   MRN:  [id number]  Admitting Diagnosis: LOLY (acute kidney injury)    Recommendations:                 General Recommendations:  Follow-up not indicated  Diet recommendations:  Regular Diet - IDDSI Level 7, Liquid Diet Level: Thin liquids - IDDSI Level 0   Aspiration Precautions: 1 bite/sip at a time, Alternating bites/sips, Feed only when awake/alert, Small bites/sips, and Standard aspiration precautions   General Precautions: Standard, fall  Communication strategies:  none    Subjective     "I sent my  to get me some fruit"  Patient goals: to go home     Pain/Comfort:  Pain Rating 1: 0/10  Pain Rating Post-Intervention 1: 0/10    Respiratory Status: Room air    Objective:     Has the patient been evaluated by SLP for swallowing?   Yes  Keep patient NPO? No   Current Respiratory Status:        Nursing cleared pt for session and reported that pt tolerating pills without difficulty. Pt seen bedside with several family members present. Pt awake/alert and family reported pt has greatly improved. Pt observed drinking water and eating a cracker with no difficulty. Family said pt's tray was changed to regular and they help with cutting up  meats. Discussed options but pt and family would prefer to stay on regular diet and help with cutting up meat. Education provided re: s/s of aspiration, swallowing precautions, and avoiding dry/chewy meats. Both pt and family expressed understanding. No further speech tx recommended.     Assessment:     Nelsy Marino is a 82 y.o. female with an SLP diagnosis of  swallowing wfl .  She presents with no further speech tx needs.    Goals:   Multidisciplinary Problems       SLP Goals          Problem: SLP    Goal Priority Disciplines Outcome   SLP Goal     SLP    Description: Speech Language Pathology Goals  Goals expected to be met by 4/30    1. Pt will tolerate diet of thin liquids and minced moist " solids without overt clinical signs of aspiration                          Plan:     Patient to be seen:  3 x/week   Plan of Care expires:     Plan of Care reviewed with:  patient, family   SLP Follow-Up:  No       Discharge recommendations:  other (see comments)   Barriers to Discharge:  None    Time Tracking:     SLP Treatment Date:   04/28/23  Speech Start Time:  0957  Speech Stop Time:  1013     Speech Total Time (min):  16 min    Billable Minutes: Treatment Swallowing Dysfunction 8 and Self Care/Home Management Training 8    04/28/2023

## 2023-04-28 NOTE — SUBJECTIVE & OBJECTIVE
Interval History:   No acute events overnight. Urine output at 300ml with creatinine improved from 2.4 to 2.3.    Review of patient's allergies indicates:   Allergen Reactions    Motrin [ibuprofen] Itching     Current Facility-Administered Medications   Medication Frequency    acetaminophen tablet 1,000 mg Q8H    amLODIPine tablet 10 mg Daily    ammonium lactate 12 % lotion Daily    atorvastatin tablet 40 mg QHS    carvediloL tablet 12.5 mg BID    collagenase ointment Daily    dextrose 10% bolus 125 mL 125 mL PRN    dextrose 10% bolus 250 mL 250 mL PRN    dextrose 40 % gel 15,000 mg PRN    dextrose 40 % gel 30,000 mg PRN    DULoxetine DR capsule 30 mg BID    glucagon (human recombinant) injection 1 mg PRN    insulin aspart U-100 pen 0-5 Units QID (AC + HS) PRN    loperamide capsule 2 mg QID PRN    micafungin 100 mg in sodium chloride 0.9 % 100 mL IVPB (MB+) Q24H    naloxone 0.4 mg/mL injection 0.02 mg PRN    ondansetron injection 4 mg Q6H PRN    oxyCODONE immediate release tablet 5 mg Q4H PRN    pantoprazole EC tablet 40 mg BID AC    piperacillin-tazobactam (ZOSYN) 4.5 g in dextrose 5 % in water (D5W) 5 % 100 mL IVPB (MB+) Q12H    sodium chloride 0.9% flush 10 mL Q12H PRN    vits A and D-white pet-lanolin ointment PRN       Objective:     Review of Systems   Constitutional:  Positive for malaise/fatigue. Negative for chills and fever.   HENT:  Negative for congestion, hearing loss, sinus pain and sore throat.    Eyes:  Negative for blurred vision, photophobia and redness.   Respiratory:  Negative for cough, shortness of breath and wheezing.    Cardiovascular:  Negative for chest pain, palpitations and leg swelling.   Gastrointestinal:  Negative for blood in stool, constipation, diarrhea, nausea and vomiting.   Genitourinary:  Negative for dysuria and flank pain.   Musculoskeletal:  Positive for myalgias. Negative for falls and joint pain.   Skin:  Negative for itching and rash.   Neurological:  Negative for  dizziness, tingling, weakness and headaches.   Psychiatric/Behavioral:  Negative for depression. The patient is not nervous/anxious.        Vital Signs (Most Recent):  Temp: 98.2 °F (36.8 °C) (04/28/23 0501)  Pulse: 91 (04/28/23 0501)  Resp: 16 (04/28/23 0914)  BP: (Abnormal) 172/73 (04/28/23 0501)  SpO2: (Abnormal) 91 % (04/28/23 0501)   Vital Signs (24h Range):  Temp:  [98.1 °F (36.7 °C)-98.4 °F (36.9 °C)] 98.2 °F (36.8 °C)  Pulse:  [83-91] 91  Resp:  [16-20] 16  SpO2:  [91 %-96 %] 91 %  BP: (111-172)/(52-73) 172/73     Weight: 68 kg (150 lb) (04/20/23 2035)  Body mass index is 24.96 kg/m².  Body surface area is 1.77 meters squared.    I/O last 3 completed shifts:  In: 200 [P.O.:200]  Out: 300 [Urine:300]    Physical Exam  Vitals and nursing note reviewed.   Constitutional:       General: She is not in acute distress.     Appearance: She is not ill-appearing.   HENT:      Head: Normocephalic and atraumatic.      Right Ear: External ear normal.      Left Ear: External ear normal.      Nose: No congestion.      Mouth/Throat:      Pharynx: Oropharynx is clear.   Eyes:      General:         Right eye: No discharge.         Left eye: No discharge.      Extraocular Movements: Extraocular movements intact.      Conjunctiva/sclera: Conjunctivae normal.   Cardiovascular:      Rate and Rhythm: Normal rate and regular rhythm.      Pulses: Normal pulses.      Heart sounds: Normal heart sounds.   Pulmonary:      Effort: Pulmonary effort is normal. No respiratory distress.      Breath sounds: Normal breath sounds. No wheezing or rales.   Abdominal:      Palpations: Abdomen is soft.      Tenderness: There is no abdominal tenderness. There is no guarding.   Musculoskeletal:      Cervical back: Neck supple. No tenderness.      Right lower leg: No edema.      Left lower leg: No edema.   Skin:     General: Skin is warm.      Findings: Lesion present.      Comments: Dark skin discoloration over the dorsal aspect of the left foot.  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   Neurological:      Mental Status: Mental status is at baseline.   Psychiatric:         Attention and Perception: Attention normal.         Mood and Affect: Mood and affect normal.         Speech: Speech normal.         Behavior: Behavior is cooperative.       Significant Labs:  CMP:   Recent Labs   Lab 04/28/23  0819   *   CALCIUM 9.0   ALBUMIN 1.9*   PROT 6.8      K 3.2*   CO2 22*      BUN 36*   CREATININE 2.3*   ALKPHOS 56   ALT 5*   AST 13   BILITOT 0.4     All labs within the past 24 hours have been reviewed.

## 2023-04-28 NOTE — PT/OT/SLP PROGRESS
Occupational Therapy      Patient Name:  Nelsy Marino   MRN:  [id number]    Patient not seen today secondary to Other (DARCO shoes not present in room at time of OT session this AM with writing therapist unable to return. BRACE contacted.). Will follow-up as appropriate per OT POC.    4/28/2023

## 2023-04-28 NOTE — ASSESSMENT & PLAN NOTE
Recently discharged home with IV Cefepime 2g Q12 hours + IV Micafungin 100mg Q24 hours for estimated end of therapy date of 5/16/2023. For R toe amputation. Patient's  reports adherence with IV Abx. L foot with darkening skin changes, which alarmed  as this was how skin presented prior to toe amputations of R foot. RLE warm to touch with L US Lower Extremity Arteries demonstrating diminished flow throughout the left lower extremity. Monophasic waveforms suggesting atherosclerotic disease.     - Podiatry and Vascular Surgery following; appreciate recs  - Will discuss plan to resume  ASA, Plavix with Vascular Surgery  - Continue Micafungin and Zosyn per prior ID recs until 5/17 to then continue Keflex ppx per ID thereafter for graft infection

## 2023-04-28 NOTE — PLAN OF CARE
Patient in bed awake with confusion noted at times, sisters at bedside, continuing Iv abt therapy, tolerating well, complained of discomfort to lower extremities, pain medication administered, tolerated well, will continue to observe.    Problem: Adult Inpatient Plan of Care  Goal: Optimal Comfort and Wellbeing  Outcome: Ongoing, Progressing  Intervention: Monitor Pain and Promote Comfort  Flowsheets (Taken 4/28/2023 5472)  Pain Management Interventions:   pillow support provided   position adjusted     Problem: Adjustment to Illness (Sepsis/Septic Shock)  Goal: Optimal Coping  Outcome: Ongoing, Progressing

## 2023-04-29 LAB
ALBUMIN SERPL BCP-MCNC: 2 G/DL (ref 3.5–5.2)
ALP SERPL-CCNC: 75 U/L (ref 55–135)
ALT SERPL W/O P-5'-P-CCNC: 6 U/L (ref 10–44)
ANION GAP SERPL CALC-SCNC: 9 MMOL/L (ref 8–16)
AST SERPL-CCNC: 16 U/L (ref 10–40)
BASOPHILS # BLD AUTO: 0.02 K/UL (ref 0–0.2)
BASOPHILS NFR BLD: 0.2 % (ref 0–1.9)
BILIRUB SERPL-MCNC: 0.3 MG/DL (ref 0.1–1)
BUN SERPL-MCNC: 34 MG/DL (ref 8–23)
CALCIUM SERPL-MCNC: 9.2 MG/DL (ref 8.7–10.5)
CHLORIDE SERPL-SCNC: 108 MMOL/L (ref 95–110)
CO2 SERPL-SCNC: 24 MMOL/L (ref 23–29)
CREAT SERPL-MCNC: 2.3 MG/DL (ref 0.5–1.4)
DIFFERENTIAL METHOD: ABNORMAL
EOSINOPHIL # BLD AUTO: 0.6 K/UL (ref 0–0.5)
EOSINOPHIL NFR BLD: 5.4 % (ref 0–8)
ERYTHROCYTE [DISTWIDTH] IN BLOOD BY AUTOMATED COUNT: 14.7 % (ref 11.5–14.5)
EST. GFR  (NO RACE VARIABLE): 20.7 ML/MIN/1.73 M^2
GLUCOSE SERPL-MCNC: 118 MG/DL (ref 70–110)
HCT VFR BLD AUTO: 26 % (ref 37–48.5)
HGB BLD-MCNC: 8 G/DL (ref 12–16)
IMM GRANULOCYTES # BLD AUTO: 0.03 K/UL (ref 0–0.04)
IMM GRANULOCYTES NFR BLD AUTO: 0.3 % (ref 0–0.5)
LYMPHOCYTES # BLD AUTO: 1.5 K/UL (ref 1–4.8)
LYMPHOCYTES NFR BLD: 14.3 % (ref 18–48)
MAGNESIUM SERPL-MCNC: 1.6 MG/DL (ref 1.6–2.6)
MCH RBC QN AUTO: 26.8 PG (ref 27–31)
MCHC RBC AUTO-ENTMCNC: 30.8 G/DL (ref 32–36)
MCV RBC AUTO: 87 FL (ref 82–98)
MONOCYTES # BLD AUTO: 0.4 K/UL (ref 0.3–1)
MONOCYTES NFR BLD: 3.6 % (ref 4–15)
NEUTROPHILS # BLD AUTO: 8.2 K/UL (ref 1.8–7.7)
NEUTROPHILS NFR BLD: 76.2 % (ref 38–73)
NRBC BLD-RTO: 0 /100 WBC
PHOSPHATE SERPL-MCNC: 3.1 MG/DL (ref 2.7–4.5)
PLATELET # BLD AUTO: 238 K/UL (ref 150–450)
PMV BLD AUTO: 11.3 FL (ref 9.2–12.9)
POCT GLUCOSE: 125 MG/DL (ref 70–110)
POCT GLUCOSE: 161 MG/DL (ref 70–110)
POCT GLUCOSE: 164 MG/DL (ref 70–110)
POCT GLUCOSE: 215 MG/DL (ref 70–110)
POTASSIUM SERPL-SCNC: 4.3 MMOL/L (ref 3.5–5.1)
PROT SERPL-MCNC: 7.4 G/DL (ref 6–8.4)
RBC # BLD AUTO: 2.98 M/UL (ref 4–5.4)
SODIUM SERPL-SCNC: 141 MMOL/L (ref 136–145)
TB INDURATION 48 - 72 HR READ: 0 MM
WBC # BLD AUTO: 10.78 K/UL (ref 3.9–12.7)

## 2023-04-29 PROCEDURE — 99232 PR SUBSEQUENT HOSPITAL CARE,LEVL II: ICD-10-PCS | Mod: GC,,, | Performed by: STUDENT IN AN ORGANIZED HEALTH CARE EDUCATION/TRAINING PROGRAM

## 2023-04-29 PROCEDURE — 25000003 PHARM REV CODE 250

## 2023-04-29 PROCEDURE — 36415 COLL VENOUS BLD VENIPUNCTURE: CPT | Performed by: STUDENT IN AN ORGANIZED HEALTH CARE EDUCATION/TRAINING PROGRAM

## 2023-04-29 PROCEDURE — 80053 COMPREHEN METABOLIC PANEL: CPT | Performed by: STUDENT IN AN ORGANIZED HEALTH CARE EDUCATION/TRAINING PROGRAM

## 2023-04-29 PROCEDURE — 99232 SBSQ HOSP IP/OBS MODERATE 35: CPT | Mod: GC,,, | Performed by: STUDENT IN AN ORGANIZED HEALTH CARE EDUCATION/TRAINING PROGRAM

## 2023-04-29 PROCEDURE — 63600175 PHARM REV CODE 636 W HCPCS

## 2023-04-29 PROCEDURE — 84100 ASSAY OF PHOSPHORUS: CPT | Performed by: STUDENT IN AN ORGANIZED HEALTH CARE EDUCATION/TRAINING PROGRAM

## 2023-04-29 PROCEDURE — 83735 ASSAY OF MAGNESIUM: CPT | Performed by: STUDENT IN AN ORGANIZED HEALTH CARE EDUCATION/TRAINING PROGRAM

## 2023-04-29 PROCEDURE — 85025 COMPLETE CBC W/AUTO DIFF WBC: CPT | Performed by: STUDENT IN AN ORGANIZED HEALTH CARE EDUCATION/TRAINING PROGRAM

## 2023-04-29 PROCEDURE — 25000003 PHARM REV CODE 250: Performed by: STUDENT IN AN ORGANIZED HEALTH CARE EDUCATION/TRAINING PROGRAM

## 2023-04-29 PROCEDURE — 25000003 PHARM REV CODE 250: Performed by: INTERNAL MEDICINE

## 2023-04-29 PROCEDURE — 11000001 HC ACUTE MED/SURG PRIVATE ROOM

## 2023-04-29 RX ORDER — CHOLECALCIFEROL (VITAMIN D3) 25 MCG
1000 TABLET ORAL DAILY
Status: DISCONTINUED | OUTPATIENT
Start: 2023-04-29 | End: 2023-05-05 | Stop reason: HOSPADM

## 2023-04-29 RX ORDER — CHOLECALCIFEROL (VITAMIN D3) 25 MCG
1000 TABLET ORAL DAILY
Status: ON HOLD
Start: 2023-04-30 | End: 2023-07-18 | Stop reason: HOSPADM

## 2023-04-29 RX ORDER — ASPIRIN 81 MG/1
81 TABLET ORAL DAILY
Status: DISCONTINUED | OUTPATIENT
Start: 2023-04-29 | End: 2023-04-30

## 2023-04-29 RX ORDER — PANTOPRAZOLE SODIUM 40 MG/1
40 TABLET, DELAYED RELEASE ORAL DAILY
Qty: 30 TABLET | Refills: 11 | Status: SHIPPED | OUTPATIENT
Start: 2023-04-30 | End: 2023-05-03 | Stop reason: SDUPTHER

## 2023-04-29 RX ADMIN — ASPIRIN 81 MG: 81 TABLET, COATED ORAL at 12:04

## 2023-04-29 RX ADMIN — COLLAGENASE SANTYL: 250 OINTMENT TOPICAL at 08:04

## 2023-04-29 RX ADMIN — Medication: at 08:04

## 2023-04-29 RX ADMIN — OXYCODONE HYDROCHLORIDE 5 MG: 5 TABLET ORAL at 05:04

## 2023-04-29 RX ADMIN — PANTOPRAZOLE SODIUM 40 MG: 40 TABLET, DELAYED RELEASE ORAL at 08:04

## 2023-04-29 RX ADMIN — PIPERACILLIN SODIUM AND TAZOBACTAM SODIUM 4.5 G: 4; .5 INJECTION, POWDER, FOR SOLUTION INTRAVENOUS at 02:04

## 2023-04-29 RX ADMIN — AMLODIPINE BESYLATE 10 MG: 10 TABLET ORAL at 08:04

## 2023-04-29 RX ADMIN — CHOLECALCIFEROL TAB 25 MCG (1000 UNIT) 1000 UNITS: 25 TAB at 08:04

## 2023-04-29 RX ADMIN — CARVEDILOL 12.5 MG: 12.5 TABLET, FILM COATED ORAL at 08:04

## 2023-04-29 RX ADMIN — DULOXETINE 30 MG: 30 CAPSULE, DELAYED RELEASE ORAL at 08:04

## 2023-04-29 RX ADMIN — ACETAMINOPHEN 1000 MG: 500 TABLET ORAL at 02:04

## 2023-04-29 RX ADMIN — ATORVASTATIN CALCIUM 40 MG: 40 TABLET, FILM COATED ORAL at 08:04

## 2023-04-29 RX ADMIN — MICAFUNGIN SODIUM 100 MG: 100 INJECTION, POWDER, LYOPHILIZED, FOR SOLUTION INTRAVENOUS at 08:04

## 2023-04-29 RX ADMIN — INSULIN ASPART 2 UNITS: 100 INJECTION, SOLUTION INTRAVENOUS; SUBCUTANEOUS at 11:04

## 2023-04-29 RX ADMIN — ACETAMINOPHEN 1000 MG: 500 TABLET ORAL at 05:04

## 2023-04-29 RX ADMIN — ACETAMINOPHEN 1000 MG: 500 TABLET ORAL at 10:04

## 2023-04-29 NOTE — ASSESSMENT & PLAN NOTE
Recently discharged home with IV Cefepime 2g Q12 hours + IV Micafungin 100mg Q24 hours for estimated end of therapy date of 5/16/2023. For R toe amputation. Patient's  reports adherence with IV Abx. L foot with darkening skin changes, which alarmed  as this was how skin presented prior to toe amputations of R foot. RLE warm to touch with L US Lower Extremity Arteries demonstrating diminished flow throughout the left lower extremity. Monophasic waveforms suggesting atherosclerotic disease.     - Podiatry and Vascular Surgery following; appreciate recs  - Restarted ASA 4/29  - Continue Micafungin and Zosyn per prior ID recs until 5/17 to then continue Keflex ppx per ID thereafter for graft infection

## 2023-04-29 NOTE — PROGRESS NOTES
[facility] Medicine  Progress Note    Patient Name: Nelsy Marino  MRN: [id number]  Patient Class: IP- Inpatient   Admission Date: 4/20/2023  Length of Stay: 7 days  Attending Physician: Shadi Vidal MD  Primary Care Provider: Juan F Garay        Subjective:     Principal Problem:LOLY (acute kidney injury)        HPI:  This is an 82-year-old female with a past medical history of severe PAD, hypertension, type 2 diabetes, HFpEF (EF 65%), L femoral bypass and L leg flap, R toe ampuations 2 months prior with presenting after  noted waxing/waning confusion since her discharge from hospital two weeks ago. History largely provided by  at bedside as patient is intermittently somnolent. She was discharged on IV Cefepime and Micafungin for her R foot amputation and patient reports adherence with regimen at home. He grew concerned that int he past two weeks she had worsening LLE pain and darkened skin, which is what happened before her R toes were amputated. His niece contacted a nurse who recommneded ED evaluation. No fevers, chills, HA, chest pain, SOB, N/V/D, abdominal pain.    Pt receives  nurse aide, PT/OT since hospital DC and ID very recently made a referral to podiatry and wound care, but appointments not yet made.     In the ED patient was hemodynamically stable and hypertensive, intermittently on RA to 2L NC. Leukocytosis to 13, Hgb 7.5, Cr 2.1, Albumin 2.1, BCx in process, CT head and CXR wnl. Started on BS Abx with continued Cefepime, [place], started on Vanc pending Bcx, US LE arterial with diminished flow throughout the left lower extremity. Monophasic waveforms suggesting atherosclerotic disease. Admitted to  for LOLY and Podiatry/Wound care.       Overview/Hospital Course:  Admitted to hospital medicine for LOLY and left foot pain. Cr on admission 2.1 (bl.8-1.5), improved with IVF initially. After holding IVF Cr increased, and then unresponsive to fluids. Patient with  significant urine retention, rivas placed and Cr again increased. Consulted Nephrology for assistance, suspect due to acute drop in Hgb. LOLY improved 4/27. Nephrology recommended heme/onc evaluation for this patient regarding the three monoclonal bands seen on SPE. Spoke with heme/onc regarding case, labs ordered in anticipation of outpatient evaluation given that patient stable. Patient having increased soft bowel movements with melena 4/25. GI consulted performed EGD with small bowel follow through 4/26 revealed red blood in the gastric body with three bleeding angioectasias in the stomach treated with APC. DAPT held in setting of acute bleed. Discussed concern of rebleeding AVM possibly due to DAPT with vascular. Will trial ASA and monitor for bleeding while inpatient.     Vascular surgery consulted on admission, recommended repeat ARCHIE and left foot arterial doppler. Podiatry also saw the patient for left foot wound, recommended wound vac placement. Both Vascular and Podiatry felt no further intervention necessary at this time.     ID consulted, patient was receiving cefepime and micafungin outpatient per ID following R foot transmetatarsal amputation. Started on vanc, cefepime, and micafungin. Patient more lethargic 4/22 although able to follow commands and move extremities on exam. With concern for cefepime toxicity, ID removed cefepime and vancomycin. Micafungin and zosyn continued. EEG and CTH ordered, no acute abnormalities. Patient significantly improved following d/c Cefepime to baseline. Patient to continue zosyn and micafungin until ID follow up 5/17 and then continue keflex ppx thereafter per ID.    SLP consulted recommended pureed dysphagia diet. PT/OT recommended SNF placement. Social work assisting with placement.      Interval History: LOLY stable. Hgb improved. 1x BM overnight,  at bedside said looked less black/tarry looking. Will restart aspirin and monitor for bleeding     Review of  Systems   Constitutional:  Negative for activity change, appetite change and fever.   HENT:  Negative for congestion and sore throat.    Respiratory:  Negative for cough, shortness of breath and wheezing.    Cardiovascular:  Negative for chest pain and leg swelling.   Gastrointestinal:  Negative for abdominal pain, constipation, diarrhea, nausea and vomiting.   Genitourinary:  Negative for difficulty urinating and dysuria.   Musculoskeletal:  Positive for arthralgias and myalgias.   Skin:  Positive for color change and wound. Negative for rash.   Allergic/Immunologic: Negative.    Neurological:  Negative for dizziness and headaches.   Hematological: Negative.    Objective:     Vital Signs (Most Recent):  Temp: 97.1 °F (36.2 °C) (04/29/23 1112)  Pulse: 78 (04/29/23 0800)  Resp: 20 (04/29/23 0800)  BP: (!) 145/63 (04/29/23 0800)  SpO2: 97 % (04/29/23 0800)   Vital Signs (24h Range):  Temp:  [97.1 °F (36.2 °C)-98.2 °F (36.8 °C)] 97.1 °F (36.2 °C)  Pulse:  [78] 78  Resp:  [18-20] 20  SpO2:  [97 %] 97 %  BP: (145)/(63) 145/63     Weight: 68 kg (149 lb 14.6 oz)  Body mass index is 24.95 kg/m².  No intake or output data in the 24 hours ending 04/29/23 1154     Physical Exam  Vitals and nursing note reviewed.   Constitutional:       General: She is not in acute distress.     Appearance: She is not ill-appearing.   HENT:      Head: Normocephalic and atraumatic.      Mouth/Throat:      Mouth: Mucous membranes are moist.      Pharynx: Oropharynx is clear.   Eyes:      Extraocular Movements: Extraocular movements intact.      Conjunctiva/sclera: Conjunctivae normal.   Cardiovascular:      Rate and Rhythm: Normal rate and regular rhythm.      Pulses: Normal pulses.   Pulmonary:      Effort: Pulmonary effort is normal. No respiratory distress.      Breath sounds: No wheezing or rales.   Abdominal:      Palpations: Abdomen is soft.      Tenderness: There is no abdominal tenderness. There is no guarding.   Musculoskeletal:       Cervical back: Normal range of motion and neck supple.      Right lower leg: No edema.      Left lower leg: No edema.   Skin:     General: Skin is warm.      Findings: Lesion (R heel, L dorsum with darkened skin) present.      Comments: Insicion on right inner thigh- c/d/I- no drainage or erythema seen.  Wound on left thigh- with dressing on top and drain in place with serosanguinous fluid.    Neurological:      General: No focal deficit present.      Mental Status: She is oriented to person, place, and time. Mental status is at baseline.   Psychiatric:         Mood and Affect: Mood normal.         Behavior: Behavior normal.         Thought Content: Thought content normal.         Judgment: Judgment normal.       Significant Labs: All pertinent labs within the past 24 hours have been reviewed.  CBC:   Recent Labs   Lab 04/27/23  1554 04/28/23  0819 04/29/23  0325   WBC 15.04* 12.49 10.78   HGB 7.1* 7.1* 8.0*   HCT 23.3* 23.2* 26.0*    209 238       CMP:   Recent Labs   Lab 04/28/23  0819 04/29/23  0325    141   K 3.2* 4.3    108   CO2 22* 24   * 118*   BUN 36* 34*   CREATININE 2.3* 2.3*   CALCIUM 9.0 9.2   PROT 6.8 7.4   ALBUMIN 1.9* 2.0*   BILITOT 0.4 0.3   ALKPHOS 56 75   AST 13 16   ALT 5* 6*   ANIONGAP 12 9         Significant Imaging: I have reviewed all pertinent imaging results/findings within the past 24 hours.      Assessment/Plan:      * LOLY (acute kidney injury)  Patient with acute kidney injury likely due to IVVD/dehydration LOLY is currently worsening. Labs reviewed- Renal function/electrolytes with Estimated Creatinine Clearance: 17 mL/min (A) (based on SCr of 2.3 mg/dL (H)). according to latest data. Monitor urine output and serial BMP and adjust therapy as needed. Avoid nephrotoxins and renally dose meds for GFR listed above.     Cr 2.1 (baseline 0.8-1.5).  Patient dry on physical exam.  Poor oral intake, thought to be prerenal in origin initially. Possible due to acute on  chronic anemia.    Recent Labs   Lab 04/28/23  0819 04/29/23  0325    141   K 3.2* 4.3    108   CO2 22* 24   BUN 36* 34*   CREATININE 2.3* 2.3*     - Nephrology consulted; appreciate assistance   - Will refer to heme/onc on discharge for further workup of three monoclonal bands seen on the SPE  - Paniagua placed after patient retaining on bladder scan 4/23 with hematuria 4/24  - Voiding trial 4/27   - RP u/s no hydronephrosis  - Urine lytes showed possible intrinsic etiology  - CMP qd, trend Cr  - strict I/O  - daily weights  - renally dose all medications  - avoid nephrotoxic agents, NSAIDs, IV contrast, ACE/ARB      Debility  - PT/OT reconsulted  - Fall precautions  - Established with HH PT/OT/nurse aide prior to hospitalization  - SW assisting in SNF placement       Skin ulcer of right heel, limited to breakdown of skin  See PVD for plan    - Wound care and podiatry consulted with appreciated mgmt recs      Acute on chronic anemia  Baseline 8.6-11.   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  GI consulted push enteroscopy 4/26 showed red blood in the gastric body and three bleeding angioectasias in the stomach treated with APC    - Continue PPI daily  - Maintain 2 large bore Ivs for volume resuscitation  - Trend CBC BID. Transfuse pRBC for Hb < 7 g/dL  - Stopped anticoagulation for now        Acute encephalopathy  A&O x3 on admission with waxing/waning per  at the bedside.  says she has been largely bed bound  since discharge, but her waxing confusion was concerning to him. Patients mentation significantly improved after stopping cefepime.  Resolved; likely 2/2 cefepime neurotoxicity   - Repeat CT Head with no acute change  - EEG without seizures  - TSH, B12, B1, Vit E, Ammonia, RPR were unremarkable  - Cefepime changed to Zosyn  - F/u blood and Urine cultures      S/P transmetatarsal amputation of foot, right  Appreciated evaluation by Podiatry      Diastolic CHF, chronic  Patient is identified as having Diastolic (HFpEF) heart failure that is Chronic. CHF is currently controlled. Latest ECHO performed and demonstrates- Results for orders placed during the hospital encounter of 02/27/23    Echo    Interpretation Summary  · The estimated ejection fraction is 65%.  · The left ventricle is normal in size with normal systolic function.  · Grade II left ventricular diastolic dysfunction.  · Normal right ventricular size with normal right ventricular systolic function.  · Mild left atrial enlargement.  · Mild-to-moderate mitral regurgitation.  · There is pulmonary hypertension.  · The estimated PA systolic pressure is 55 mmHg.  · Intermediate central venous pressure (8 mmHg).  . Continue Beta Blocker and Furosemide and monitor clinical status closely. Monitor on telemetry. Patient is off CHF pathway.  Monitor strict Is&Os and daily weights.  Place on fluid restriction of 1.5 L. Continue to stress to patient importance of self efficacy and  on diet for CHF. Last BNP reviewed- and noted below No results for input(s): BNP, BNPTRIAGEBLO in the last 168 hours..    - Holding home Lasix in setting of LOLY  - Holding Hyzaar (HCTZ) in setting of LOLY  - Patient breathing comfortably on RA, no signs of volume overload      Type 2 diabetes mellitus with chronic kidney disease, with long-term current use of insulin  Patient's FSGs are uncontrolled due to hyperglycemia on current medication regimen.  Last A1c reviewed-   Lab Results    Component Value Date    HGBA1C 8.2 (H) 02/28/2023     Most recent fingerstick glucose reviewed-   Recent Labs   Lab 04/27/23  1532 04/27/23  2031 04/28/23  0724   POCTGLUCOSE 189* 188* 157*     Current correctional scale  Low  Maintain anti-hyperglycemic dose as follows-   Antihyperglycemics (From admission, onward)    Start     Stop Route Frequency Ordered    04/21/23 0355  insulin aspart U-100 pen 0-5 Units         -- SubQ Before meals & nightly PRN 04/21/23 0255        Hold Oral hypoglycemics while patient is in the hospital.    Essential hypertension  - Continue home Coreg, amlodipine  - hold Hyzaar in setting of LOLY      PAD (peripheral artery disease)  Recently discharged home with IV Cefepime 2g Q12 hours + IV Micafungin 100mg Q24 hours for estimated end of therapy date of 5/16/2023. For R toe amputation. Patient's  reports adherence with IV Abx. L foot with darkening skin changes, which alarmed  as this was how skin presented prior to toe amputations of R foot. RLE warm to touch with L US Lower Extremity Arteries demonstrating diminished flow throughout the left lower extremity. Monophasic waveforms suggesting atherosclerotic disease.     - Podiatry and Vascular Surgery following; appreciate recs  - Restarted ASA 4/29  - Continue Micafungin and Zosyn per prior ID recs until 5/17 to then continue Keflex ppx per ID thereafter for graft infection      VTE Risk Mitigation (From admission, onward)         Ordered     IP VTE HIGH RISK PATIENT  Once         04/21/23 0254     Place sequential compression device  Until discontinued         04/21/23 0254                Discharge Planning   JUAN ALBERTO: 4/29/2023     Code Status: Full Code   Is the patient medically ready for discharge?: No    Reason for patient still in hospital (select all that apply): Patient trending condition and Pending disposition  Discharge Plan A: Skilled Nursing Facility                  Boris Mckeon DO  Department of Hospital  Medicine   [facility] S[facility]

## 2023-04-29 NOTE — ASSESSMENT & PLAN NOTE
Patient with acute kidney injury likely due to IVVD/dehydration LOLY is currently worsening. Labs reviewed- Renal function/electrolytes with Estimated Creatinine Clearance: 17 mL/min (A) (based on SCr of 2.3 mg/dL (H)). according to latest data. Monitor urine output and serial BMP and adjust therapy as needed. Avoid nephrotoxins and renally dose meds for GFR listed above.     Cr 2.1 (baseline 0.8-1.5).  Patient dry on physical exam.  Poor oral intake, thought to be prerenal in origin initially. Possible due to acute on chronic anemia.    Recent Labs   Lab 04/28/23  0819 04/29/23  0325    141   K 3.2* 4.3    108   CO2 22* 24   BUN 36* 34*   CREATININE 2.3* 2.3*     - Nephrology consulted; appreciate assistance   - Will refer to heme/onc on discharge for further workup of three monoclonal bands seen on the SPE  - Paniagua placed after patient retaining on bladder scan 4/23 with hematuria 4/24  - Voiding trial 4/27   - RP u/s no hydronephrosis  - Urine lytes showed possible intrinsic etiology  - CMP qd, trend Cr  - strict I/O  - daily weights  - renally dose all medications  - avoid nephrotoxic agents, NSAIDs, IV contrast, ACE/ARB

## 2023-04-29 NOTE — PLAN OF CARE
Problem: Adult Inpatient Plan of Care  Goal: Plan of Care Review  Outcome: Ongoing, Progressing  Goal: Patient-Specific Goal (Individualized)  Outcome: Ongoing, Progressing  Goal: Absence of Hospital-Acquired Illness or Injury  Outcome: Ongoing, Progressing  Goal: Optimal Comfort and Wellbeing  Outcome: Ongoing, Progressing     Problem: Diabetes Comorbidity  Goal: Blood Glucose Level Within Targeted Range  Outcome: Ongoing, Progressing     Problem: Bleeding (Sepsis/Septic Shock)  Goal: Absence of Bleeding  Outcome: Ongoing, Progressing     Problem: Infection Progression (Sepsis/Septic Shock)  Goal: Absence of Infection Signs and Symptoms  Outcome: Ongoing, Progressing     Patient AOOx4 at this time  Nelsy free from injury at this time  Room filled with visiting family and friends  Denies pain or distress at this time  Bed in lowest position and locked  Call bell and personal items within reach

## 2023-04-29 NOTE — SUBJECTIVE & OBJECTIVE
Interval History: LOLY stable. Hgb improved. 1x BM overnight,  at bedside said looked less black/tarry looking. Will restart aspirin and monitor for bleeding     Review of Systems   Constitutional:  Negative for activity change, appetite change and fever.   HENT:  Negative for congestion and sore throat.    Respiratory:  Negative for cough, shortness of breath and wheezing.    Cardiovascular:  Negative for chest pain and leg swelling.   Gastrointestinal:  Negative for abdominal pain, constipation, diarrhea, nausea and vomiting.   Genitourinary:  Negative for difficulty urinating and dysuria.   Musculoskeletal:  Positive for arthralgias and myalgias.   Skin:  Positive for color change and wound. Negative for rash.   Allergic/Immunologic: Negative.    Neurological:  Negative for dizziness and headaches.   Hematological: Negative.    Objective:     Vital Signs (Most Recent):  Temp: 97.1 °F (36.2 °C) (04/29/23 1112)  Pulse: 78 (04/29/23 0800)  Resp: 20 (04/29/23 0800)  BP: (!) 145/63 (04/29/23 0800)  SpO2: 97 % (04/29/23 0800)   Vital Signs (24h Range):  Temp:  [97.1 °F (36.2 °C)-98.2 °F (36.8 °C)] 97.1 °F (36.2 °C)  Pulse:  [78] 78  Resp:  [18-20] 20  SpO2:  [97 %] 97 %  BP: (145)/(63) 145/63     Weight: 68 kg (149 lb 14.6 oz)  Body mass index is 24.95 kg/m².  No intake or output data in the 24 hours ending 04/29/23 1154     Physical Exam  Vitals and nursing note reviewed.   Constitutional:       General: She is not in acute distress.     Appearance: She is not ill-appearing.   HENT:      Head: Normocephalic and atraumatic.      Mouth/Throat:      Mouth: Mucous membranes are moist.      Pharynx: Oropharynx is clear.   Eyes:      Extraocular Movements: Extraocular movements intact.      Conjunctiva/sclera: Conjunctivae normal.   Cardiovascular:      Rate and Rhythm: Normal rate and regular rhythm.      Pulses: Normal pulses.   Pulmonary:      Effort: Pulmonary effort is normal. No respiratory distress.      Breath  sounds: No wheezing or rales.   Abdominal:      Palpations: Abdomen is soft.      Tenderness: There is no abdominal tenderness. There is no guarding.   Musculoskeletal:      Cervical back: Normal range of motion and neck supple.      Right lower leg: No edema.      Left lower leg: No edema.   Skin:     General: Skin is warm.      Findings: Lesion (R heel, L dorsum with darkened skin) present.      Comments: Insicion on right inner thigh- c/d/I- no drainage or erythema seen.  Wound on left thigh- with dressing on top and drain in place with serosanguinous fluid.    Neurological:      General: No focal deficit present.      Mental Status: She is oriented to person, place, and time. Mental status is at baseline.   Psychiatric:         Mood and Affect: Mood normal.         Behavior: Behavior normal.         Thought Content: Thought content normal.         Judgment: Judgment normal.       Significant Labs: All pertinent labs within the past 24 hours have been reviewed.  CBC:   Recent Labs   Lab 04/27/23  1554 04/28/23  0819 04/29/23  0325   WBC 15.04* 12.49 10.78   HGB 7.1* 7.1* 8.0*   HCT 23.3* 23.2* 26.0*    209 238       CMP:   Recent Labs   Lab 04/28/23  0819 04/29/23  0325    141   K 3.2* 4.3    108   CO2 22* 24   * 118*   BUN 36* 34*   CREATININE 2.3* 2.3*   CALCIUM 9.0 9.2   PROT 6.8 7.4   ALBUMIN 1.9* 2.0*   BILITOT 0.4 0.3   ALKPHOS 56 75   AST 13 16   ALT 5* 6*   ANIONGAP 12 9         Significant Imaging: I have reviewed all pertinent imaging results/findings within the past 24 hours.

## 2023-04-29 NOTE — PLAN OF CARE
Problem: Adult Inpatient Plan of Care  Goal: Plan of Care Review  Outcome: Ongoing, Progressing  Goal: Patient-Specific Goal (Individualized)  Outcome: Ongoing, Progressing  Goal: Absence of Hospital-Acquired Illness or Injury  Outcome: Ongoing, Progressing  Goal: Optimal Comfort and Wellbeing  Outcome: Ongoing, Progressing  Goal: Readiness for Transition of Care  Outcome: Ongoing, Progressing     Problem: Diabetes Comorbidity  Goal: Blood Glucose Level Within Targeted Range  Outcome: Ongoing, Progressing     Problem: Adjustment to Illness (Sepsis/Septic Shock)  Goal: Optimal Coping  Outcome: Ongoing, Progressing     Problem: Bleeding (Sepsis/Septic Shock)  Goal: Absence of Bleeding  Outcome: Ongoing, Progressing     Problem: Glycemic Control Impaired (Sepsis/Septic Shock)  Goal: Blood Glucose Level Within Desired Range  Outcome: Ongoing, Progressing     Problem: Infection Progression (Sepsis/Septic Shock)  Goal: Absence of Infection Signs and Symptoms  Outcome: Ongoing, Progressing     Problem: Nutrition Impaired (Sepsis/Septic Shock)  Goal: Optimal Nutrition Intake  Outcome: Ongoing, Progressing     Problem: Infection  Goal: Absence of Infection Signs and Symptoms  Outcome: Ongoing, Progressing     Problem: Fluid and Electrolyte Imbalance (Acute Kidney Injury/Impairment)  Goal: Fluid and Electrolyte Balance  Outcome: Ongoing, Progressing     Problem: Oral Intake Inadequate (Acute Kidney Injury/Impairment)  Goal: Optimal Nutrition Intake  Outcome: Ongoing, Progressing     Problem: Renal Function Impairment (Acute Kidney Injury/Impairment)  Goal: Effective Renal Function  Outcome: Ongoing, Progressing     Problem: Skin Injury Risk Increased  Goal: Skin Health and Integrity  Outcome: Ongoing, Progressing     Problem: Fall Injury Risk  Goal: Absence of Fall and Fall-Related Injury  Outcome: Ongoing, Progressing

## 2023-04-29 NOTE — ASSESSMENT & PLAN NOTE
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  GI consulted push enteroscopy 4/26 showed red blood in the gastric body and three bleeding angioectasias in the stomach treated with APC    - Continue PPI daily  - Maintain 2 large bore Ivs for volume resuscitation  - Trend CBC BID. Transfuse pRBC for Hb < 7 g/dL  - Stopped anticoagulation for now

## 2023-04-30 LAB
ALBUMIN SERPL BCP-MCNC: 1.9 G/DL (ref 3.5–5.2)
ALP SERPL-CCNC: 55 U/L (ref 55–135)
ALT SERPL W/O P-5'-P-CCNC: 5 U/L (ref 10–44)
ANION GAP SERPL CALC-SCNC: 9 MMOL/L (ref 8–16)
AST SERPL-CCNC: 15 U/L (ref 10–40)
BASOPHILS # BLD AUTO: 0.02 K/UL (ref 0–0.2)
BASOPHILS NFR BLD: 0.2 % (ref 0–1.9)
BILIRUB SERPL-MCNC: 0.3 MG/DL (ref 0.1–1)
BUN SERPL-MCNC: 33 MG/DL (ref 8–23)
CALCIUM SERPL-MCNC: 8.9 MG/DL (ref 8.7–10.5)
CHLORIDE SERPL-SCNC: 108 MMOL/L (ref 95–110)
CO2 SERPL-SCNC: 23 MMOL/L (ref 23–29)
CREAT SERPL-MCNC: 2.1 MG/DL (ref 0.5–1.4)
DIFFERENTIAL METHOD: ABNORMAL
EOSINOPHIL # BLD AUTO: 0.5 K/UL (ref 0–0.5)
EOSINOPHIL # BLD AUTO: 0.5 K/UL (ref 0–0.5)
EOSINOPHIL # BLD AUTO: 0.6 K/UL (ref 0–0.5)
EOSINOPHIL NFR BLD: 5.1 % (ref 0–8)
EOSINOPHIL NFR BLD: 5.5 % (ref 0–8)
EOSINOPHIL NFR BLD: 5.9 % (ref 0–8)
ERYTHROCYTE [DISTWIDTH] IN BLOOD BY AUTOMATED COUNT: 14.5 % (ref 11.5–14.5)
ERYTHROCYTE [DISTWIDTH] IN BLOOD BY AUTOMATED COUNT: 14.6 % (ref 11.5–14.5)
ERYTHROCYTE [DISTWIDTH] IN BLOOD BY AUTOMATED COUNT: 14.8 % (ref 11.5–14.5)
EST. GFR  (NO RACE VARIABLE): 23.1 ML/MIN/1.73 M^2
GLUCOSE SERPL-MCNC: 114 MG/DL (ref 70–110)
HCT VFR BLD AUTO: 22.8 % (ref 37–48.5)
HCT VFR BLD AUTO: 24.8 % (ref 37–48.5)
HCT VFR BLD AUTO: 30.4 % (ref 37–48.5)
HGB BLD-MCNC: 7 G/DL (ref 12–16)
HGB BLD-MCNC: 7.4 G/DL (ref 12–16)
HGB BLD-MCNC: 8.2 G/DL (ref 12–16)
IMM GRANULOCYTES # BLD AUTO: 0.03 K/UL (ref 0–0.04)
IMM GRANULOCYTES # BLD AUTO: 0.04 K/UL (ref 0–0.04)
IMM GRANULOCYTES # BLD AUTO: 0.06 K/UL (ref 0–0.04)
IMM GRANULOCYTES NFR BLD AUTO: 0.3 % (ref 0–0.5)
IMM GRANULOCYTES NFR BLD AUTO: 0.4 % (ref 0–0.5)
IMM GRANULOCYTES NFR BLD AUTO: 0.7 % (ref 0–0.5)
LYMPHOCYTES # BLD AUTO: 1.3 K/UL (ref 1–4.8)
LYMPHOCYTES # BLD AUTO: 1.4 K/UL (ref 1–4.8)
LYMPHOCYTES # BLD AUTO: 1.5 K/UL (ref 1–4.8)
LYMPHOCYTES NFR BLD: 13.5 % (ref 18–48)
LYMPHOCYTES NFR BLD: 15 % (ref 18–48)
LYMPHOCYTES NFR BLD: 15.9 % (ref 18–48)
MAGNESIUM SERPL-MCNC: 1.5 MG/DL (ref 1.6–2.6)
MCH RBC QN AUTO: 26.4 PG (ref 27–31)
MCH RBC QN AUTO: 26.5 PG (ref 27–31)
MCH RBC QN AUTO: 26.7 PG (ref 27–31)
MCHC RBC AUTO-ENTMCNC: 27 G/DL (ref 32–36)
MCHC RBC AUTO-ENTMCNC: 29.8 G/DL (ref 32–36)
MCHC RBC AUTO-ENTMCNC: 30.7 G/DL (ref 32–36)
MCV RBC AUTO: 87 FL (ref 82–98)
MCV RBC AUTO: 89 FL (ref 82–98)
MCV RBC AUTO: 98 FL (ref 82–98)
MONOCYTES # BLD AUTO: 0.4 K/UL (ref 0.3–1)
MONOCYTES NFR BLD: 3.9 % (ref 4–15)
MONOCYTES NFR BLD: 4.3 % (ref 4–15)
MONOCYTES NFR BLD: 4.3 % (ref 4–15)
NEUTROPHILS # BLD AUTO: 6.8 K/UL (ref 1.8–7.7)
NEUTROPHILS # BLD AUTO: 7 K/UL (ref 1.8–7.7)
NEUTROPHILS # BLD AUTO: 7.6 K/UL (ref 1.8–7.7)
NEUTROPHILS NFR BLD: 73.8 % (ref 38–73)
NEUTROPHILS NFR BLD: 74.2 % (ref 38–73)
NEUTROPHILS NFR BLD: 76.6 % (ref 38–73)
NRBC BLD-RTO: 0 /100 WBC
PHOSPHATE SERPL-MCNC: 3.7 MG/DL (ref 2.7–4.5)
PLATELET # BLD AUTO: 223 K/UL (ref 150–450)
PLATELET # BLD AUTO: 232 K/UL (ref 150–450)
PLATELET # BLD AUTO: 237 K/UL (ref 150–450)
PMV BLD AUTO: 10.6 FL (ref 9.2–12.9)
PMV BLD AUTO: 11.1 FL (ref 9.2–12.9)
PMV BLD AUTO: 11.4 FL (ref 9.2–12.9)
POCT GLUCOSE: 128 MG/DL (ref 70–110)
POCT GLUCOSE: 132 MG/DL (ref 70–110)
POCT GLUCOSE: 139 MG/DL (ref 70–110)
POCT GLUCOSE: 143 MG/DL (ref 70–110)
POTASSIUM SERPL-SCNC: 3.8 MMOL/L (ref 3.5–5.1)
PROT SERPL-MCNC: 6.9 G/DL (ref 6–8.4)
RBC # BLD AUTO: 2.62 M/UL (ref 4–5.4)
RBC # BLD AUTO: 2.79 M/UL (ref 4–5.4)
RBC # BLD AUTO: 3.11 M/UL (ref 4–5.4)
SODIUM SERPL-SCNC: 140 MMOL/L (ref 136–145)
WBC # BLD AUTO: 9.21 K/UL (ref 3.9–12.7)
WBC # BLD AUTO: 9.38 K/UL (ref 3.9–12.7)
WBC # BLD AUTO: 9.88 K/UL (ref 3.9–12.7)

## 2023-04-30 PROCEDURE — 99233 PR SUBSEQUENT HOSPITAL CARE,LEVL III: ICD-10-PCS | Mod: GC,,, | Performed by: STUDENT IN AN ORGANIZED HEALTH CARE EDUCATION/TRAINING PROGRAM

## 2023-04-30 PROCEDURE — 25000003 PHARM REV CODE 250

## 2023-04-30 PROCEDURE — 63600175 PHARM REV CODE 636 W HCPCS

## 2023-04-30 PROCEDURE — 99233 SBSQ HOSP IP/OBS HIGH 50: CPT | Mod: GC,,, | Performed by: STUDENT IN AN ORGANIZED HEALTH CARE EDUCATION/TRAINING PROGRAM

## 2023-04-30 PROCEDURE — 63600175 PHARM REV CODE 636 W HCPCS: Performed by: STUDENT IN AN ORGANIZED HEALTH CARE EDUCATION/TRAINING PROGRAM

## 2023-04-30 PROCEDURE — 25000003 PHARM REV CODE 250: Performed by: STUDENT IN AN ORGANIZED HEALTH CARE EDUCATION/TRAINING PROGRAM

## 2023-04-30 PROCEDURE — 85025 COMPLETE CBC W/AUTO DIFF WBC: CPT | Mod: 91 | Performed by: STUDENT IN AN ORGANIZED HEALTH CARE EDUCATION/TRAINING PROGRAM

## 2023-04-30 PROCEDURE — 83735 ASSAY OF MAGNESIUM: CPT | Performed by: STUDENT IN AN ORGANIZED HEALTH CARE EDUCATION/TRAINING PROGRAM

## 2023-04-30 PROCEDURE — 36415 COLL VENOUS BLD VENIPUNCTURE: CPT | Performed by: STUDENT IN AN ORGANIZED HEALTH CARE EDUCATION/TRAINING PROGRAM

## 2023-04-30 PROCEDURE — 11000001 HC ACUTE MED/SURG PRIVATE ROOM

## 2023-04-30 PROCEDURE — 85025 COMPLETE CBC W/AUTO DIFF WBC: CPT | Performed by: STUDENT IN AN ORGANIZED HEALTH CARE EDUCATION/TRAINING PROGRAM

## 2023-04-30 PROCEDURE — 80053 COMPREHEN METABOLIC PANEL: CPT | Performed by: STUDENT IN AN ORGANIZED HEALTH CARE EDUCATION/TRAINING PROGRAM

## 2023-04-30 PROCEDURE — 25000003 PHARM REV CODE 250: Performed by: INTERNAL MEDICINE

## 2023-04-30 PROCEDURE — 84100 ASSAY OF PHOSPHORUS: CPT | Performed by: STUDENT IN AN ORGANIZED HEALTH CARE EDUCATION/TRAINING PROGRAM

## 2023-04-30 RX ORDER — ACETAMINOPHEN 500 MG
1000 TABLET ORAL EVERY 8 HOURS
Refills: 0 | Status: ON HOLD
Start: 2023-04-30 | End: 2023-08-03 | Stop reason: HOSPADM

## 2023-04-30 RX ORDER — PANTOPRAZOLE SODIUM 40 MG/1
40 TABLET, DELAYED RELEASE ORAL
Status: DISCONTINUED | OUTPATIENT
Start: 2023-04-30 | End: 2023-05-01

## 2023-04-30 RX ORDER — MAGNESIUM SULFATE HEPTAHYDRATE 40 MG/ML
2 INJECTION, SOLUTION INTRAVENOUS ONCE
Status: COMPLETED | OUTPATIENT
Start: 2023-04-30 | End: 2023-04-30

## 2023-04-30 RX ADMIN — PANTOPRAZOLE SODIUM 40 MG: 40 TABLET, DELAYED RELEASE ORAL at 04:04

## 2023-04-30 RX ADMIN — PIPERACILLIN SODIUM AND TAZOBACTAM SODIUM 4.5 G: 4; .5 INJECTION, POWDER, FOR SOLUTION INTRAVENOUS at 02:04

## 2023-04-30 RX ADMIN — ACETAMINOPHEN 1000 MG: 500 TABLET ORAL at 05:04

## 2023-04-30 RX ADMIN — PIPERACILLIN SODIUM AND TAZOBACTAM SODIUM 4.5 G: 4; .5 INJECTION, POWDER, FOR SOLUTION INTRAVENOUS at 03:04

## 2023-04-30 RX ADMIN — OXYCODONE HYDROCHLORIDE 5 MG: 5 TABLET ORAL at 09:04

## 2023-04-30 RX ADMIN — MICAFUNGIN SODIUM 100 MG: 100 INJECTION, POWDER, LYOPHILIZED, FOR SOLUTION INTRAVENOUS at 10:04

## 2023-04-30 RX ADMIN — LOPERAMIDE HYDROCHLORIDE 2 MG: 2 CAPSULE ORAL at 09:04

## 2023-04-30 RX ADMIN — DULOXETINE 30 MG: 30 CAPSULE, DELAYED RELEASE ORAL at 08:04

## 2023-04-30 RX ADMIN — Medication: at 10:04

## 2023-04-30 RX ADMIN — CHOLECALCIFEROL TAB 25 MCG (1000 UNIT) 1000 UNITS: 25 TAB at 09:04

## 2023-04-30 RX ADMIN — CARVEDILOL 12.5 MG: 12.5 TABLET, FILM COATED ORAL at 09:04

## 2023-04-30 RX ADMIN — MAGNESIUM SULFATE 2 G: 2 INJECTION INTRAVENOUS at 10:04

## 2023-04-30 RX ADMIN — ATORVASTATIN CALCIUM 40 MG: 40 TABLET, FILM COATED ORAL at 08:04

## 2023-04-30 RX ADMIN — DULOXETINE 30 MG: 30 CAPSULE, DELAYED RELEASE ORAL at 09:04

## 2023-04-30 RX ADMIN — AMLODIPINE BESYLATE 10 MG: 10 TABLET ORAL at 09:04

## 2023-04-30 RX ADMIN — ACETAMINOPHEN 1000 MG: 500 TABLET ORAL at 01:04

## 2023-04-30 RX ADMIN — CARVEDILOL 12.5 MG: 12.5 TABLET, FILM COATED ORAL at 08:04

## 2023-04-30 RX ADMIN — COLLAGENASE SANTYL: 250 OINTMENT TOPICAL at 10:04

## 2023-04-30 RX ADMIN — PANTOPRAZOLE SODIUM 40 MG: 40 TABLET, DELAYED RELEASE ORAL at 09:04

## 2023-04-30 NOTE — PLAN OF CARE
NURSING HOME ORDERS    04/30/2023  [facility] - MED SURG  [address] [facility] [address]  Dept: [phone]  Loc: [phone]     Admit to Nursing Home:      Diagnoses:  Active Hospital Problems    Diagnosis  POA    *LOLY (acute kidney injury) [N17.9]  Yes    Moderate malnutrition [E44.0]  Unknown    Somnolence [R40.0]  Unknown    Vascular inflammation or infection [I77.6]  Yes    Wound infection [T14.8XXA, L08.9]  Yes    Skin ulcer of right heel, limited to breakdown of skin [L97.411]  Yes    Debility [R53.81]  Unknown    Acute on chronic anemia [D64.9]  Yes    Eschar of foot [R23.4]  Yes    Melena [K92.1]  Yes    Acute encephalopathy [G93.40]  Yes    S/P transmetatarsal amputation of foot, right [Z89.431]  Not Applicable     Chronic    Essential hypertension [I10]  Yes     Chronic    PAD (peripheral artery disease) [I73.9]  Yes     Chronic    Type 2 diabetes mellitus with chronic kidney disease, 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       Patient is homebound due to:  LOLY (acute kidney injury)    Allergies:  Review of patient's allergies indicates:   Allergen Reactions    Motrin [ibuprofen] Itching       Vitals:  Routine    Diet: diabetic diet: 2000 calorie    Activities:   Activity as tolerated    Goals of Care Treatment Preferences:  Code Status: Full Code      Labs:  CBC, CMP, CRP weekly on Mondays    Fax Lab Results to Infectious Diseases Provider: Noel Presley     [facility] Fax Number: [phone]     Nursing Precautions:  Aspiration , Fall, and Pressure ulcer prevention    Consults:   PT to evaluate and treat- 5 times a week, OT to evaluate and treat- 5 times a week, and Wound Care     Miscellaneous Care: Wound Care: yes:  Vascular Wound:  Location: Right thigh    Consult ET nurse          Wound Vac:    Location:  Left thigh           ET Consult              Outpatient Infectious Diseases Follow-up     Follow-up appointment made for 5/17/23 with Dr. Hopper.    Antimicrobial administration:   Administer (drug and dose):       -Zosyn 4.5 g  IV q 12  hours      Last dose given: 15:00 5/1/2023                   Next dose due: 2:00AM 5/2/2023  STOP DATE: 5/17/2023     - Micafungin 100 IV q 24 hours     Last dose given: 10:00 5/1/2023                   Next  dose due:10:00 5/2/3023  STOP DATE: 5/17/2023       Scrub the Hub: Prior to accessing the line, always perform a 30 second alcohol scrub  Each lumen of the central line is to be flushed at least daily with 10 mL Normal Saline and 3 mL Heparin flush (10 units/mL)  Skilled Nurse (SN) may draw blood from IV access  Blood Draw Procedure:              - Aspirate at least 5 mL of blood              - Discard              - Obtain specimen              - Change injection cap              - Flush with 20 mL Normal Saline followed by a                 3-5 mL Heparin flush (10 units/mL)  Central :              - Sterile dressing changes are done weekly and as needed.              - Use chlor-hexadine scrub to cleanse site, apply Biopatch to insertion site,                 apply securement device dressing              - Injection caps are changed weekly and after EVERY lab draw.              - If sterile gauze is under dressing to control oozing,                 dressing change must be performed every 24 hours until gauze is not needed.          Diabetes Care:  Fingerstick blood sugar AC and HS and Report CBG < 60 or > 350 to physician.      Medications: Discontinue all previous medication orders, if any. See new list below.     Medication List        START taking these medications      acetaminophen 500 MG tablet  Commonly known as: TYLENOL  Take 2 tablets (1,000 mg total) by mouth every 8 (eight) hours.  Replaces: acetaminophen 650 MG Tbsr     vitamin D 1000  "units Tab  Commonly known as: VITAMIN D3  Take 1 tablet (1,000 Units total) by mouth once daily.            CHANGE how you take these medications      pantoprazole 40 MG tablet  Commonly known as: PROTONIX  Take 1 tablet (40 mg total) by mouth once daily.  What changed: when to take this            CONTINUE taking these medications      atorvastatin 40 MG tablet  Commonly known as: LIPITOR  Take 1 tablet (40 mg total) by mouth every evening.     BD SHERINE 2ND GEN PEN NEEDLE 32 gauge x 5/32" Ndle  Generic drug: pen needle, diabetic  Inject 1 each into the skin 4 (four) times daily.     BenadryL 25 mg capsule  Generic drug: diphenhydrAMINE  Take 25 mg by mouth daily as needed for Itching.     carvediloL 12.5 MG tablet  Commonly known as: COREG  Take 12.5 mg by mouth 2 (two) times daily.     DULoxetine 30 MG capsule  Commonly known as: CYMBALTA  Take 1 capsule (30 mg total) by mouth 2 (two) times daily.     insulin syringe-needle,dispos. 0.3 mL 30 gauge x 5/16" Syrg  by Misc.(Non-Drug; Combo Route) route.     TRUE METRIX GLUCOSE TEST STRIP MISC  by Misc.(Non-Drug; Combo Route) route.            STOP taking these medications      acetaminophen 650 MG Tbsr  Commonly known as: TYLENOL  Replaced by: acetaminophen 500 MG tablet     benzonatate 100 MG capsule  Commonly known as: TESSALON     cephALEXin 500 MG capsule  Commonly known as: KEFLEX     HYDROcodone-acetaminophen 5-325 mg per tablet  Commonly known as: NORCO     losartan-hydrochlorothiazide 100-25 mg 100-25 mg per tablet  Commonly known as: HYZAAR            ASK your doctor about these medications      aspirin 81 MG EC tablet  Commonly known as: ECOTRIN  Take 81 mg by mouth once daily.     clopidogreL 75 mg tablet  Commonly known as: PLAVIX  Take 1 tablet (75 mg total) by mouth once daily.     furosemide 20 MG tablet  Commonly known as: LASIX  Take 1 tablet (20 mg total) by mouth once daily.     insulin aspart protamine-insulin aspart 100 unit/mL (70-30) Inpn " pen  Commonly known as: NovoLOG Mix 70-30FlexPen U-100  Inject 12 Units into the skin 2 (two) times daily before meals.     insulin aspart U-100 100 unit/mL injection  Commonly known as: NovoLOG  Inject 4 Units into the skin 3 (three) times daily before meals.     LEVEMIR FLEXTOUCH U-100 INSULN 100 unit/mL (3 mL) Inpn pen  Generic drug: insulin detemir U-100 (Levemir)  Inject 9 Units into the skin every evening.     oxyCODONE 5 MG immediate release tablet  Commonly known as: ROXICODONE  Take 1 tablet (5 mg total) by mouth every 6 (six) hours as needed for Pain.                Immunizations Administered as of 4/30/2023       No immunizations on file.            This patient has had both covid vaccinations    Some patients may experience side effects after vaccination.  These may include fever, headache, muscle or joint aches.  Most symptoms resolve with 24-48 hours and do not require urgent medical evaluation unless they persist for more than 72 hours or symptoms are concerning for an unrelated medical condition.          _________________________________  Geneva Turk,   04/30/2023

## 2023-04-30 NOTE — SUBJECTIVE & OBJECTIVE
Interval History: Patient had melena today and slight drop on hgb.  Will recheck CBC to assess whether it is new bleeding. Remains hemodynamically stable.    Review of Systems   Constitutional:  Negative for activity change, appetite change and fever.   HENT:  Negative for congestion and sore throat.    Respiratory:  Negative for cough, shortness of breath and wheezing.    Cardiovascular:  Negative for chest pain and leg swelling.   Gastrointestinal:  Negative for abdominal pain, constipation, diarrhea, nausea and vomiting.        Dark stools   Genitourinary:  Negative for difficulty urinating and dysuria.   Musculoskeletal:  Positive for arthralgias and myalgias.   Skin:  Positive for color change and wound. Negative for rash.   Allergic/Immunologic: Negative.    Neurological:  Negative for dizziness and headaches.   Hematological: Negative.    Objective:     Vital Signs (Most Recent):  Temp: 96.8 °F (36 °C) (04/30/23 1123)  Pulse: 88 (04/30/23 1123)  Resp: 20 (04/30/23 1123)  BP: (!) 152/67 (04/30/23 1123)  SpO2: (!) 94 % (04/30/23 1123)   Vital Signs (24h Range):  Temp:  [96.8 °F (36 °C)-98.9 °F (37.2 °C)] 96.8 °F (36 °C)  Pulse:  [78-90] 88  Resp:  [17-20] 20  SpO2:  [93 %-97 %] 94 %  BP: (133-161)/(7-72) 152/67     Weight: 68 kg (149 lb 14.6 oz)  Body mass index is 24.95 kg/m².    Intake/Output Summary (Last 24 hours) at 4/30/2023 1327  Last data filed at 4/30/2023 1154  Gross per 24 hour   Intake --   Output 550 ml   Net -550 ml      Physical Exam  Vitals and nursing note reviewed.   Constitutional:       General: She is not in acute distress.     Appearance: She is not ill-appearing.   HENT:      Head: Normocephalic and atraumatic.      Mouth/Throat:      Mouth: Mucous membranes are moist.      Pharynx: Oropharynx is clear.   Eyes:      Extraocular Movements: Extraocular movements intact.      Conjunctiva/sclera: Conjunctivae normal.   Cardiovascular:      Rate and Rhythm: Normal rate and regular rhythm.       Pulses: Normal pulses.   Pulmonary:      Effort: Pulmonary effort is normal. No respiratory distress.      Breath sounds: No wheezing or rales.   Abdominal:      Palpations: Abdomen is soft.      Tenderness: There is no abdominal tenderness. There is no guarding.   Musculoskeletal:      Cervical back: Normal range of motion and neck supple.      Right lower leg: No edema.      Left lower leg: No edema.   Skin:     General: Skin is warm.      Findings: Lesion (R heel, L dorsum with darkened skin) present.      Comments: Insicion on right inner thigh- c/d/I- no drainage or erythema seen.  Wound on left thigh- with dressing on top and drain in place with serosanguinous fluid.    Neurological:      General: No focal deficit present.      Mental Status: She is oriented to person, place, and time. Mental status is at baseline.       Significant Labs: All pertinent labs within the past 24 hours have been reviewed.  CBC:   Recent Labs   Lab 04/29/23  0325 04/30/23  0436   WBC 10.78 9.88   HGB 8.0* 7.4*   HCT 26.0* 24.8*    232     CMP:   Recent Labs   Lab 04/29/23  0325 04/30/23  0436    140   K 4.3 3.8    108   CO2 24 23   * 114*   BUN 34* 33*   CREATININE 2.3* 2.1*   CALCIUM 9.2 8.9   PROT 7.4 6.9   ALBUMIN 2.0* 1.9*   BILITOT 0.3 0.3   ALKPHOS 75 55   AST 16 15   ALT 6* 5*   ANIONGAP 9 9       Significant Imaging: I have reviewed all pertinent imaging results/findings within the past 24 hours.

## 2023-04-30 NOTE — ASSESSMENT & PLAN NOTE
Recently discharged home with IV Cefepime 2g Q12 hours + IV Micafungin 100mg Q24 hours for estimated end of therapy date of 5/16/2023. For R toe amputation. Patient's  reports adherence with IV Abx. L foot with darkening skin changes, which alarmed  as this was how skin presented prior to toe amputations of R foot. RLE warm to touch with L US Lower Extremity Arteries demonstrating diminished flow throughout the left lower extremity. Monophasic waveforms suggesting atherosclerotic disease.     - Podiatry and Vascular Surgery following; appreciate recs  - Restarted ASA 4/29, but discontinued after melena this morning. Will recheck CBC, if hgb is stable, will resume ASA.  - Continue Micafungin and Zosyn per prior ID recs until 5/17 to then continue Keflex ppx per ID thereafter for graft infection

## 2023-04-30 NOTE — PROGRESS NOTES
[facility] [place] Medicine  Progress Note    Patient Name: Nelsy Marino  MRN: [id number]  Patient Class: IP- Inpatient   Admission Date: 4/20/2023  Length of Stay: 8 days  Attending Physician: Shadi Vidal MD  Primary Care Provider: Juan F Garay        Subjective:     Principal Problem:LOLY (acute kidney injury)        HPI:  This is an 82-year-old female with a past medical history of severe PAD, hypertension, type 2 diabetes, HFpEF (EF 65%), L femoral bypass and L leg flap, R toe ampuations 2 months prior with presenting after  noted waxing/waning confusion since her discharge from hospital two weeks ago. History largely provided by  at bedside as patient is intermittently somnolent. She was discharged on IV Cefepime and Micafungin for her R foot amputation and patient reports adherence with regimen at home. He grew concerned that int he past two weeks she had worsening LLE pain and darkened skin, which is what happened before her R toes were amputated. His niece contacted a nurse who recommneded ED evaluation. No fevers, chills, HA, chest pain, SOB, N/V/D, abdominal pain.    Pt receives  nurse aide, PT/OT since hospital DC and ID very recently made a referral to podiatry and wound care, but appointments not yet made.     In the ED patient was hemodynamically stable and hypertensive, intermittently on RA to 2L NC. Leukocytosis to 13, Hgb 7.5, Cr 2.1, Albumin 2.1, BCx in process, CT head and CXR wnl. Started on BS Abx with continued Cefepime, [place], started on Vanc pending Bcx, US LE arterial with diminished flow throughout the left lower extremity. Monophasic waveforms suggesting atherosclerotic disease. Admitted to  for LOLY and Podiatry/Wound care.       Overview/Hospital Course:  Admitted to hospital medicine for LOLY and left foot pain. Cr on admission 2.1 (bl.8-1.5), improved with IVF initially. After holding IVF Cr increased, and then unresponsive to fluids. Patient with  significant urine retention, rivas placed and Cr again increased. Consulted Nephrology for assistance, suspect due to acute drop in Hgb. LOLY improved 4/27. Nephrology recommended heme/onc evaluation for this patient regarding the three monoclonal bands seen on SPE. Spoke with heme/onc regarding case, labs ordered in anticipation of outpatient evaluation given that patient stable. Patient having increased soft bowel movements with melena 4/25. GI consulted performed EGD with small bowel follow through 4/26 revealed red blood in the gastric body with three bleeding angioectasias in the stomach treated with APC. DAPT held in setting of acute bleed. Discussed concern of rebleeding AVM possibly due to DAPT with vascular. Will trial ASA and monitor for bleeding while inpatient.     Vascular surgery consulted on admission, recommended repeat ARCHIE and left foot arterial doppler. Podiatry also saw the patient for left foot wound, recommended wound vac placement. Both Vascular and Podiatry felt no further intervention necessary at this time.     ID consulted, patient was receiving cefepime and micafungin outpatient per ID following R foot transmetatarsal amputation. Started on vanc, cefepime, and micafungin. Patient more lethargic 4/22 although able to follow commands and move extremities on exam. With concern for cefepime toxicity, ID removed cefepime and vancomycin. Micafungin and zosyn continued. EEG and CTH ordered, no acute abnormalities. Patient significantly improved following d/c Cefepime to baseline. Patient to continue zosyn and micafungin until ID follow up 5/17 and then continue keflex ppx thereafter per ID.    SLP consulted recommended pureed dysphagia diet. PT/OT recommended SNF placement. Social work assisting with placement.      Interval History: Patient had melena today and slight drop on hgb.  Will recheck CBC to assess whether it is new bleeding. Remains hemodynamically stable.    Review of Systems    Constitutional:  Negative for activity change, appetite change and fever.   HENT:  Negative for congestion and sore throat.    Respiratory:  Negative for cough, shortness of breath and wheezing.    Cardiovascular:  Negative for chest pain and leg swelling.   Gastrointestinal:  Negative for abdominal pain, constipation, diarrhea, nausea and vomiting.        Dark stools   Genitourinary:  Negative for difficulty urinating and dysuria.   Musculoskeletal:  Positive for arthralgias and myalgias.   Skin:  Positive for color change and wound. Negative for rash.   Allergic/Immunologic: Negative.    Neurological:  Negative for dizziness and headaches.   Hematological: Negative.    Objective:     Vital Signs (Most Recent):  Temp: 96.8 °F (36 °C) (04/30/23 1123)  Pulse: 88 (04/30/23 1123)  Resp: 20 (04/30/23 1123)  BP: (!) 152/67 (04/30/23 1123)  SpO2: (!) 94 % (04/30/23 1123)   Vital Signs (24h Range):  Temp:  [96.8 °F (36 °C)-98.9 °F (37.2 °C)] 96.8 °F (36 °C)  Pulse:  [78-90] 88  Resp:  [17-20] 20  SpO2:  [93 %-97 %] 94 %  BP: (133-161)/(7-72) 152/67     Weight: 68 kg (149 lb 14.6 oz)  Body mass index is 24.95 kg/m².    Intake/Output Summary (Last 24 hours) at 4/30/2023 1327  Last data filed at 4/30/2023 1154  Gross per 24 hour   Intake --   Output 550 ml   Net -550 ml      Physical Exam  Vitals and nursing note reviewed.   Constitutional:       General: She is not in acute distress.     Appearance: She is not ill-appearing.   HENT:      Head: Normocephalic and atraumatic.      Mouth/Throat:      Mouth: Mucous membranes are moist.      Pharynx: Oropharynx is clear.   Eyes:      Extraocular Movements: Extraocular movements intact.      Conjunctiva/sclera: Conjunctivae normal.   Cardiovascular:      Rate and Rhythm: Normal rate and regular rhythm.      Pulses: Normal pulses.   Pulmonary:      Effort: Pulmonary effort is normal. No respiratory distress.      Breath sounds: No wheezing or rales.   Abdominal:      Palpations:  Abdomen is soft.      Tenderness: There is no abdominal tenderness. There is no guarding.   Musculoskeletal:      Cervical back: Normal range of motion and neck supple.      Right lower leg: No edema.      Left lower leg: No edema.   Skin:     General: Skin is warm.      Findings: Lesion (R heel, L dorsum with darkened skin) present.      Comments: Insicion on right inner thigh- c/d/I- no drainage or erythema seen.  Wound on left thigh- with dressing on top and drain in place with serosanguinous fluid.    Neurological:      General: No focal deficit present.      Mental Status: She is oriented to person, place, and time. Mental status is at baseline.       Significant Labs: All pertinent labs within the past 24 hours have been reviewed.  CBC:   Recent Labs   Lab 04/29/23 0325 04/30/23  0436   WBC 10.78 9.88   HGB 8.0* 7.4*   HCT 26.0* 24.8*    232     CMP:   Recent Labs   Lab 04/29/23 0325 04/30/23 0436    140   K 4.3 3.8    108   CO2 24 23   * 114*   BUN 34* 33*   CREATININE 2.3* 2.1*   CALCIUM 9.2 8.9   PROT 7.4 6.9   ALBUMIN 2.0* 1.9*   BILITOT 0.3 0.3   ALKPHOS 75 55   AST 16 15   ALT 6* 5*   ANIONGAP 9 9       Significant Imaging: I have reviewed all pertinent imaging results/findings within the past 24 hours.      Assessment/Plan:      * LOLY (acute kidney injury)  Patient with acute kidney injury likely due to IVVD/dehydration LOLY is currently worsening. Labs reviewed- Renal function/electrolytes with Estimated Creatinine Clearance: 17 mL/min (A) (based on SCr of 2.3 mg/dL (H)). according to latest data. Monitor urine output and serial BMP and adjust therapy as needed. Avoid nephrotoxins and renally dose meds for GFR listed above.     Cr 2.1 (baseline 0.8-1.5).  Patient dry on physical exam.  Poor oral intake, thought to be prerenal in origin initially. Possible due to acute on chronic anemia.    Recent Labs   Lab 04/28/23  0819 04/29/23  0325    141   K 3.2* 4.3    108    CO2 22* 24   BUN 36* 34*   CREATININE 2.3* 2.3*     - Nephrology consulted; appreciate assistance   - Will refer to heme/onc on discharge for further workup of three monoclonal bands seen on the SPE  - [place] placed after patient retaining on bladder scan 4/23 with hematuria 4/24  - Voiding trial 4/27   - RP u/s no hydronephrosis  - Urine lytes showed possible intrinsic etiology  - CMP qd, trend Cr  - strict I/O  - daily weights  - renally dose all medications  - avoid nephrotoxic agents, NSAIDs, IV contrast, ACE/ARB      Debility  - PT/OT reconsulted  - Fall precautions  - Established with HH PT/OT/nurse aide prior to hospitalization  - SW assisting in SNF placement       Skin ulcer of right heel, limited to breakdown of skin  See PVD for plan    - Wound care and podiatry consulted with appreciated mgmt recs      Acute on chronic anemia  Baseline 8.6-11.   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  GI consulted push enteroscopy 4/26 showed red blood in the gastric body and three bleeding angioectasias in the stomach treated with APC    - Continue PPI daily  - Maintain 2 large bore Ivs for volume resuscitation  - Trend CBC BID. Transfuse pRBC for Hb < 7 g/dL  - Stopped anticoagulation for now        Acute encephalopathy  A&O x3 on admission with waxing/waning per  at the bedside.  says she has been largely bed bound since discharge, but her waxing confusion was concerning to him. Patients mentation significantly improved  after stopping cefepime.  Resolved; likely 2/2 cefepime neurotoxicity   - Repeat CT Head with no acute change  - EEG without seizures  - TSH, B12, B1, Vit E, Ammonia, RPR were unremarkable  - Cefepime changed to Zosyn  - F/u blood and Urine cultures      S/P transmetatarsal amputation of foot, right  Appreciated evaluation by Podiatry      Diastolic CHF, chronic  Patient is identified as having Diastolic (HFpEF) heart failure that is Chronic. CHF is currently controlled. Latest ECHO performed and demonstrates- Results for orders placed during the hospital encounter of 02/27/23    Echo    Interpretation Summary  · The estimated ejection fraction is 65%.  · The left ventricle is normal in size with normal systolic function.  · Grade II left ventricular diastolic dysfunction.  · Normal right ventricular size with normal right ventricular systolic function.  · Mild left atrial enlargement.  · Mild-to-moderate mitral regurgitation.  · There is pulmonary hypertension.  · The estimated PA systolic pressure is 55 mmHg.  · Intermediate central venous pressure (8 mmHg).  . Continue Beta Blocker and Furosemide and monitor clinical status closely. Monitor on telemetry. Patient is off CHF pathway.  Monitor strict Is&Os and daily weights.  Place on fluid restriction of 1.5 L. Continue to stress to patient importance of self efficacy and  on diet for CHF. Last BNP reviewed- and noted below No results for input(s): BNP, BNPTRIAGEBLO in the last 168 hours..    - Holding home Lasix in setting of LOLY  - Holding Hyzaar (HCTZ) in setting of LOLY  - Patient breathing comfortably on RA, no signs of volume overload      Type 2 diabetes mellitus with chronic kidney disease, with long-term current use of insulin  Patient's FSGs are uncontrolled due to hyperglycemia on current medication regimen.  Last A1c reviewed-   Lab Results   Component Value Date    HGBA1C 8.2 (H) 02/28/2023     Most recent fingerstick glucose reviewed-   Recent  Labs   Lab 04/27/23  1532 04/27/23  2031 04/28/23  0724   POCTGLUCOSE 189* 188* 157*     Current correctional scale  Low  Maintain anti-hyperglycemic dose as follows-   Antihyperglycemics (From admission, onward)    Start     Stop Route Frequency Ordered    04/21/23 0355  insulin aspart U-100 pen 0-5 Units         -- SubQ Before meals & nightly PRN 04/21/23 0255        Hold Oral hypoglycemics while patient is in the hospital.    Essential hypertension  - Continue home Coreg, amlodipine  - hold Hyzaar in setting of LOLY      PAD (peripheral artery disease)  Recently discharged home with IV Cefepime 2g Q12 hours + IV Micafungin 100mg Q24 hours for estimated end of therapy date of 5/16/2023. For R toe amputation. Patient's  reports adherence with IV Abx. L foot with darkening skin changes, which alarmed  as this was how skin presented prior to toe amputations of R foot. RLE warm to touch with L US Lower Extremity Arteries demonstrating diminished flow throughout the left lower extremity. Monophasic waveforms suggesting atherosclerotic disease.     - Podiatry and Vascular Surgery following; appreciate recs  - Restarted ASA 4/29, but discontinued after melena this morning. Will recheck CBC, if hgb is stable, will resume ASA.  - Continue Micafungin and Zosyn per prior ID recs until 5/17 to then continue Keflex ppx per ID thereafter for graft infection      VTE Risk Mitigation (From admission, onward)         Ordered     IP VTE HIGH RISK PATIENT  Once         04/21/23 0254     Place sequential compression device  Until discontinued         04/21/23 0254                Discharge Planning   JUAN ALBERTO: 5/1/2023     Code Status: Full Code   Is the patient medically ready for discharge?: Yes    Reason for patient still in hospital (select all that apply): Treatment and Pending disposition  Discharge Plan A: Skilled Nursing Facility                  Geneva Turk DO  Department of Hospital Medicine   [facility]  Surg

## 2023-05-01 ENCOUNTER — ANESTHESIA EVENT (OUTPATIENT)
Dept: ENDOSCOPY | Facility: HOSPITAL | Age: 83
DRG: 682 | End: 2023-05-01
Payer: MEDICARE

## 2023-05-01 LAB
ABO + RH BLD: ABNORMAL
ALBUMIN SERPL BCP-MCNC: 2 G/DL (ref 3.5–5.2)
ALP SERPL-CCNC: 50 U/L (ref 55–135)
ALT SERPL W/O P-5'-P-CCNC: <5 U/L (ref 10–44)
ANION GAP SERPL CALC-SCNC: 8 MMOL/L (ref 8–16)
AST SERPL-CCNC: 14 U/L (ref 10–40)
BASOPHILS # BLD AUTO: 0.02 K/UL (ref 0–0.2)
BASOPHILS # BLD AUTO: 0.02 K/UL (ref 0–0.2)
BASOPHILS NFR BLD: 0.2 % (ref 0–1.9)
BASOPHILS NFR BLD: 0.2 % (ref 0–1.9)
BILIRUB SERPL-MCNC: 0.3 MG/DL (ref 0.1–1)
BLD GP AB SCN CELLS X3 SERPL QL: ABNORMAL
BLD PROD TYP BPU: NORMAL
BLOOD GROUP ANTIBODIES SERPL: NORMAL
BLOOD UNIT EXPIRATION DATE: NORMAL
BLOOD UNIT TYPE CODE: 7300
BLOOD UNIT TYPE: NORMAL
BUN SERPL-MCNC: 33 MG/DL (ref 8–23)
CALCIUM SERPL-MCNC: 8.9 MG/DL (ref 8.7–10.5)
CHLORIDE SERPL-SCNC: 110 MMOL/L (ref 95–110)
CO2 SERPL-SCNC: 25 MMOL/L (ref 23–29)
CODING SYSTEM: NORMAL
CREAT SERPL-MCNC: 2.1 MG/DL (ref 0.5–1.4)
CROSSMATCH INTERPRETATION: NORMAL
DAT IGG-SP REAG RBC-IMP: NORMAL
DIFFERENTIAL METHOD: ABNORMAL
DIFFERENTIAL METHOD: ABNORMAL
DISPENSE STATUS: NORMAL
EOSINOPHIL # BLD AUTO: 0.7 K/UL (ref 0–0.5)
EOSINOPHIL # BLD AUTO: 0.8 K/UL (ref 0–0.5)
EOSINOPHIL NFR BLD: 6.7 % (ref 0–8)
EOSINOPHIL NFR BLD: 9.2 % (ref 0–8)
ERYTHROCYTE [DISTWIDTH] IN BLOOD BY AUTOMATED COUNT: 14.5 % (ref 11.5–14.5)
ERYTHROCYTE [DISTWIDTH] IN BLOOD BY AUTOMATED COUNT: 14.6 % (ref 11.5–14.5)
EST. GFR  (NO RACE VARIABLE): 23.1 ML/MIN/1.73 M^2
GLUCOSE SERPL-MCNC: 119 MG/DL (ref 70–110)
HCT VFR BLD AUTO: 20 % (ref 37–48.5)
HCT VFR BLD AUTO: 23.6 % (ref 37–48.5)
HGB BLD-MCNC: 6.1 G/DL (ref 12–16)
HGB BLD-MCNC: 7.1 G/DL (ref 12–16)
IMM GRANULOCYTES # BLD AUTO: 0.02 K/UL (ref 0–0.04)
IMM GRANULOCYTES # BLD AUTO: 0.03 K/UL (ref 0–0.04)
IMM GRANULOCYTES NFR BLD AUTO: 0.2 % (ref 0–0.5)
IMM GRANULOCYTES NFR BLD AUTO: 0.3 % (ref 0–0.5)
LYMPHOCYTES # BLD AUTO: 1.6 K/UL (ref 1–4.8)
LYMPHOCYTES # BLD AUTO: 1.6 K/UL (ref 1–4.8)
LYMPHOCYTES NFR BLD: 14.9 % (ref 18–48)
LYMPHOCYTES NFR BLD: 17.1 % (ref 18–48)
MAGNESIUM SERPL-MCNC: 2 MG/DL (ref 1.6–2.6)
MCH RBC QN AUTO: 26.2 PG (ref 27–31)
MCH RBC QN AUTO: 26.4 PG (ref 27–31)
MCHC RBC AUTO-ENTMCNC: 30.1 G/DL (ref 32–36)
MCHC RBC AUTO-ENTMCNC: 30.5 G/DL (ref 32–36)
MCV RBC AUTO: 87 FL (ref 82–98)
MCV RBC AUTO: 87 FL (ref 82–98)
MONOCYTES # BLD AUTO: 0.4 K/UL (ref 0.3–1)
MONOCYTES # BLD AUTO: 0.4 K/UL (ref 0.3–1)
MONOCYTES NFR BLD: 3.6 % (ref 4–15)
MONOCYTES NFR BLD: 4.1 % (ref 4–15)
NEUTROPHILS # BLD AUTO: 6.3 K/UL (ref 1.8–7.7)
NEUTROPHILS # BLD AUTO: 7.9 K/UL (ref 1.8–7.7)
NEUTROPHILS NFR BLD: 69.1 % (ref 38–73)
NEUTROPHILS NFR BLD: 74.4 % (ref 38–73)
NRBC BLD-RTO: 0 /100 WBC
NRBC BLD-RTO: 0 /100 WBC
NUM UNITS TRANS PACKED RBC: NORMAL
PLATELET # BLD AUTO: 263 K/UL (ref 150–450)
PLATELET # BLD AUTO: 272 K/UL (ref 150–450)
PMV BLD AUTO: 11 FL (ref 9.2–12.9)
PMV BLD AUTO: 11.1 FL (ref 9.2–12.9)
POCT GLUCOSE: 164 MG/DL (ref 70–110)
POCT GLUCOSE: 199 MG/DL (ref 70–110)
POCT GLUCOSE: 212 MG/DL (ref 70–110)
POCT GLUCOSE: 335 MG/DL (ref 70–110)
POTASSIUM SERPL-SCNC: 3.8 MMOL/L (ref 3.5–5.1)
PROT SERPL-MCNC: 6.9 G/DL (ref 6–8.4)
RBC # BLD AUTO: 2.31 M/UL (ref 4–5.4)
RBC # BLD AUTO: 2.71 M/UL (ref 4–5.4)
SODIUM SERPL-SCNC: 143 MMOL/L (ref 136–145)
SPECIMEN OUTDATE: ABNORMAL
WBC # BLD AUTO: 10.59 K/UL (ref 3.9–12.7)
WBC # BLD AUTO: 9.17 K/UL (ref 3.9–12.7)

## 2023-05-01 PROCEDURE — C9113 INJ PANTOPRAZOLE SODIUM, VIA: HCPCS | Performed by: STUDENT IN AN ORGANIZED HEALTH CARE EDUCATION/TRAINING PROGRAM

## 2023-05-01 PROCEDURE — 83735 ASSAY OF MAGNESIUM: CPT | Performed by: STUDENT IN AN ORGANIZED HEALTH CARE EDUCATION/TRAINING PROGRAM

## 2023-05-01 PROCEDURE — 86870 RBC ANTIBODY IDENTIFICATION: CPT

## 2023-05-01 PROCEDURE — 36430 TRANSFUSION BLD/BLD COMPNT: CPT

## 2023-05-01 PROCEDURE — 99233 PR SUBSEQUENT HOSPITAL CARE,LEVL III: ICD-10-PCS | Mod: GC,,, | Performed by: STUDENT IN AN ORGANIZED HEALTH CARE EDUCATION/TRAINING PROGRAM

## 2023-05-01 PROCEDURE — 63600175 PHARM REV CODE 636 W HCPCS

## 2023-05-01 PROCEDURE — 80053 COMPREHEN METABOLIC PANEL: CPT | Performed by: STUDENT IN AN ORGANIZED HEALTH CARE EDUCATION/TRAINING PROGRAM

## 2023-05-01 PROCEDURE — 97530 THERAPEUTIC ACTIVITIES: CPT | Mod: CQ

## 2023-05-01 PROCEDURE — 36415 COLL VENOUS BLD VENIPUNCTURE: CPT | Performed by: STUDENT IN AN ORGANIZED HEALTH CARE EDUCATION/TRAINING PROGRAM

## 2023-05-01 PROCEDURE — 25000003 PHARM REV CODE 250

## 2023-05-01 PROCEDURE — 86902 BLOOD TYPE ANTIGEN DONOR EA: CPT

## 2023-05-01 PROCEDURE — 97112 NEUROMUSCULAR REEDUCATION: CPT | Mod: CQ

## 2023-05-01 PROCEDURE — 25000003 PHARM REV CODE 250: Performed by: INTERNAL MEDICINE

## 2023-05-01 PROCEDURE — 86880 COOMBS TEST DIRECT: CPT

## 2023-05-01 PROCEDURE — 86922 COMPATIBILITY TEST ANTIGLOB: CPT

## 2023-05-01 PROCEDURE — 11000001 HC ACUTE MED/SURG PRIVATE ROOM

## 2023-05-01 PROCEDURE — 86900 BLOOD TYPING SEROLOGIC ABO: CPT

## 2023-05-01 PROCEDURE — 25000003 PHARM REV CODE 250: Performed by: STUDENT IN AN ORGANIZED HEALTH CARE EDUCATION/TRAINING PROGRAM

## 2023-05-01 PROCEDURE — P9016 RBC LEUKOCYTES REDUCED: HCPCS

## 2023-05-01 PROCEDURE — 85025 COMPLETE CBC W/AUTO DIFF WBC: CPT | Performed by: STUDENT IN AN ORGANIZED HEALTH CARE EDUCATION/TRAINING PROGRAM

## 2023-05-01 PROCEDURE — 63600175 PHARM REV CODE 636 W HCPCS: Performed by: STUDENT IN AN ORGANIZED HEALTH CARE EDUCATION/TRAINING PROGRAM

## 2023-05-01 PROCEDURE — 99233 SBSQ HOSP IP/OBS HIGH 50: CPT | Mod: GC,,, | Performed by: STUDENT IN AN ORGANIZED HEALTH CARE EDUCATION/TRAINING PROGRAM

## 2023-05-01 RX ORDER — POTASSIUM CHLORIDE 20 MEQ/1
20 TABLET, EXTENDED RELEASE ORAL ONCE
Status: COMPLETED | OUTPATIENT
Start: 2023-05-01 | End: 2023-05-01

## 2023-05-01 RX ORDER — HYDROCODONE BITARTRATE AND ACETAMINOPHEN 500; 5 MG/1; MG/1
TABLET ORAL
Status: DISCONTINUED | OUTPATIENT
Start: 2023-05-01 | End: 2023-05-05 | Stop reason: HOSPADM

## 2023-05-01 RX ORDER — PANTOPRAZOLE SODIUM 40 MG/10ML
40 INJECTION, POWDER, LYOPHILIZED, FOR SOLUTION INTRAVENOUS 2 TIMES DAILY
Status: DISCONTINUED | OUTPATIENT
Start: 2023-05-01 | End: 2023-05-02

## 2023-05-01 RX ADMIN — ACETAMINOPHEN 1000 MG: 500 TABLET ORAL at 05:05

## 2023-05-01 RX ADMIN — OXYCODONE HYDROCHLORIDE 5 MG: 5 TABLET ORAL at 01:05

## 2023-05-01 RX ADMIN — MICAFUNGIN SODIUM 100 MG: 100 INJECTION, POWDER, LYOPHILIZED, FOR SOLUTION INTRAVENOUS at 08:05

## 2023-05-01 RX ADMIN — Medication: at 08:05

## 2023-05-01 RX ADMIN — LOPERAMIDE HYDROCHLORIDE 2 MG: 2 CAPSULE ORAL at 10:05

## 2023-05-01 RX ADMIN — PIPERACILLIN SODIUM AND TAZOBACTAM SODIUM 4.5 G: 4; .5 INJECTION, POWDER, FOR SOLUTION INTRAVENOUS at 04:05

## 2023-05-01 RX ADMIN — CARVEDILOL 12.5 MG: 12.5 TABLET, FILM COATED ORAL at 09:05

## 2023-05-01 RX ADMIN — ACETAMINOPHEN 1000 MG: 500 TABLET ORAL at 03:05

## 2023-05-01 RX ADMIN — PANTOPRAZOLE SODIUM 40 MG: 40 TABLET, DELAYED RELEASE ORAL at 05:05

## 2023-05-01 RX ADMIN — CHOLECALCIFEROL TAB 25 MCG (1000 UNIT) 1000 UNITS: 25 TAB at 08:05

## 2023-05-01 RX ADMIN — AMLODIPINE BESYLATE 10 MG: 10 TABLET ORAL at 08:05

## 2023-05-01 RX ADMIN — OXYCODONE HYDROCHLORIDE 5 MG: 5 TABLET ORAL at 08:05

## 2023-05-01 RX ADMIN — POTASSIUM CHLORIDE 20 MEQ: 1500 TABLET, EXTENDED RELEASE ORAL at 03:05

## 2023-05-01 RX ADMIN — DULOXETINE 30 MG: 30 CAPSULE, DELAYED RELEASE ORAL at 09:05

## 2023-05-01 RX ADMIN — DULOXETINE 30 MG: 30 CAPSULE, DELAYED RELEASE ORAL at 08:05

## 2023-05-01 RX ADMIN — PANTOPRAZOLE SODIUM 40 MG: 40 INJECTION, POWDER, FOR SOLUTION INTRAVENOUS at 10:05

## 2023-05-01 RX ADMIN — PANTOPRAZOLE SODIUM 40 MG: 40 INJECTION, POWDER, FOR SOLUTION INTRAVENOUS at 08:05

## 2023-05-01 RX ADMIN — INSULIN ASPART 4 UNITS: 100 INJECTION, SOLUTION INTRAVENOUS; SUBCUTANEOUS at 12:05

## 2023-05-01 RX ADMIN — ACETAMINOPHEN 1000 MG: 500 TABLET ORAL at 08:05

## 2023-05-01 RX ADMIN — OXYCODONE HYDROCHLORIDE 5 MG: 5 TABLET ORAL at 10:05

## 2023-05-01 RX ADMIN — LOPERAMIDE HYDROCHLORIDE 2 MG: 2 CAPSULE ORAL at 08:05

## 2023-05-01 RX ADMIN — INSULIN ASPART 2 UNITS: 100 INJECTION, SOLUTION INTRAVENOUS; SUBCUTANEOUS at 05:05

## 2023-05-01 RX ADMIN — ATORVASTATIN CALCIUM 40 MG: 40 TABLET, FILM COATED ORAL at 08:05

## 2023-05-01 RX ADMIN — CARVEDILOL 12.5 MG: 12.5 TABLET, FILM COATED ORAL at 08:05

## 2023-05-01 RX ADMIN — COLLAGENASE SANTYL: 250 OINTMENT TOPICAL at 10:05

## 2023-05-01 RX ADMIN — PIPERACILLIN SODIUM AND TAZOBACTAM SODIUM 4.5 G: 4; .5 INJECTION, POWDER, FOR SOLUTION INTRAVENOUS at 02:05

## 2023-05-01 NOTE — ANESTHESIA PREPROCEDURE EVALUATION
[facility]  Anesthesia Pre-Operative Evaluation         Patient Name: Nelsy Marino  YOB: 1940  MRN: [id number]    SUBJECTIVE:     Pre-operative evaluation for Procedure(s) (LRB):  EGD (ESOPHAGOGASTRODUODENOSCOPY) (N/A)     05/01/2023    Nelsy Marino is a 82-year-old female with a history of peripheral arterial disease status post left femoral bypass and left leg flap with right toe amputation February 2023, hypertension, diabetes, heart failure with preserved ejection fraction who was initially admitted for altered mental status concern for necrotic left foot.     5/1 several melanotic stools with drop in hgb     consented    Patient now presents for the above procedure(s).      LDA: None documented.  PICC Double Lumen 04/05/23 1107 right basilic (Active)   Line Necessity Review Longterm central access required 05/01/23 0800   Verification by X-ray Yes 04/24/23 2013   Site Assessment No drainage;No redness 05/01/23 0800   Extremity Assessment Distal to IV No abnormal discoloration 05/01/23 0800   Line Securement Device Secured with sutureless device 05/01/23 0800   Dressing Type CHG impregnated dressing/sponge 05/01/23 0800   Dressing Status Clean;Dry;Intact 05/01/23 0800   Dressing Intervention Integrity maintained 05/01/23 0800   Date on Dressing 04/24/23 04/30/23 1000   Dressing Due to be Changed 05/01/23 04/30/23 1000   Distal Patency/Care flushed w/o difficulty 04/28/23 0800   Proximal 1 Patency/Care flushed w/o difficulty 04/28/23 0800   Current Insertion Depth (cm) 34 cm 04/05/23 1108   Current Exposed Catheter (cm) 0 cm 04/05/23 1108   Extremity Circumference (cm) 30 cm 04/05/23 1500   Waveform Normal 04/05/23 1108   Number of days: 26            Closed/Suction Drain 03/31/23 0953 Left;Anterior;Lateral Thigh Bulb 15 Fr. (Active)   Site Description Unable to view 04/30/23 1000   Dressing Type Gauze;Transparent (Tegaderm) 04/30/23 1000   Dressing Status  Clean;Dry;Intact 05/01/23 0800   Dressing Intervention Integrity maintained 05/01/23 0800   Drainage Bloody 04/30/23 1000   Status To bulb suction 04/30/23 1000   Output (mL) 0 mL 04/28/23 0800   Number of days: 31       Prev airway: None documented.    Drips: None documented.      Patient Active Problem List   Diagnosis    Lichen sclerosus et atrophicus    PAD (peripheral artery disease)    Essential hypertension    Type 2 diabetes mellitus with chronic kidney disease, with long-term current use of insulin    Diastolic CHF, chronic    Obesity (BMI 30-39.9)    Critical right lower limb ischemia    Acute hypoxemic respiratory failure    History of DVT (deep vein thrombosis)    Wound dehiscence    Fluid collection at surgical site    Cellulitis    Postoperative seroma of musculoskeletal structure after non-musculoskeletal procedure    Abscess of right groin    Critical limb ischemia with history of revascularization of same extremity    Sepsis    Hyponatremia    Vascular graft infection    S/P transmetatarsal amputation of foot, right    Leukocytosis    Acute encephalopathy    Bilious vomiting    Upper GI bleed    Diabetic foot infection    Melena    Eschar of foot    Acute on chronic anemia    LOLY (acute kidney injury)    Skin ulcer of right heel, limited to breakdown of skin    Debility    Vascular inflammation or infection    Wound infection    Somnolence    Moderate malnutrition       Review of patient's allergies indicates:   Allergen Reactions    Motrin [ibuprofen] Itching       Current Inpatient Medications:   acetaminophen  1,000 mg Oral Q8H    amLODIPine  10 mg Oral Daily    ammonium lactate   Topical (Top) Daily    atorvastatin  40 mg Oral QHS    carvediloL  12.5 mg Oral BID    collagenase   Topical (Top) Daily    DULoxetine  30 mg Oral BID    micafungin (MYCAMINE) IVPB  100 mg Intravenous Q24H    pantoprazole  40 mg Intravenous BID    piperacillin-tazobactam  "(ZOSYN) IVPB  4.5 g Intravenous Q12H    vitamin D  1,000 Units Oral Daily       No current facility-administered medications on file prior to encounter.     Current Outpatient Medications on File Prior to Encounter   Medication Sig Dispense Refill    atorvastatin (LIPITOR) 40 MG tablet Take 1 tablet (40 mg total) by mouth every evening. 30 tablet 0    carvediloL (COREG) 12.5 MG tablet Take 12.5 mg by mouth 2 (two) times daily.      diphenhydrAMINE (BENADRYL) 25 mg capsule Take 25 mg by mouth daily as needed for Itching.      DULoxetine (CYMBALTA) 30 MG capsule Take 1 capsule (30 mg total) by mouth 2 (two) times daily. 60 capsule 0    furosemide (LASIX) 20 MG tablet Take 1 tablet (20 mg total) by mouth once daily. 30 tablet 0    insulin aspart U-100 (NOVOLOG) 100 unit/mL injection Inject 4 Units into the skin 3 (three) times daily before meals.      insulin detemir U-100, Levemir, (LEVEMIR FLEXTOUCH U-100 INSULN) 100 unit/mL (3 mL) InPn pen Inject 9 Units into the skin every evening.      BLOOD SUGAR DIAGNOSTIC (TRUE METRIX GLUCOSE TEST STRIP MISC) by Misc.(Non-Drug; Combo Route) route.      insulin aspart protamine-insulin aspart (NOVOLOG MIX 70-30FLEXPEN U-100) 100 unit/mL (70-30) InPn pen Inject 12 Units into the skin 2 (two) times daily before meals. (Patient not taking: Reported on 4/26/2023)  0    insulin syringe-needle,dispos. 0.3 mL 30 gauge x 5/16" Syrg by Misc.(Non-Drug; Combo Route) route.      oxyCODONE (ROXICODONE) 5 MG immediate release tablet Take 1 tablet (5 mg total) by mouth every 6 (six) hours as needed for Pain. (Patient not taking: Reported on 4/26/2023) 12 tablet 0    pen needle, diabetic 32 gauge x 5/32" Ndle Inject 1 each into the skin 4 (four) times daily. 100 each 0       Past Surgical History:   Procedure Laterality Date     femoral vascular surgery       ANGIOGRAPHY OF LOWER EXTREMITY Right 2/23/2023    Procedure: Angiogram Extremity Unilateral;  Surgeon: Danny CAGE" MD Bettina;  Location: [facility] OR;  Service: Vascular;  Laterality: Right;  RN PHONE PREOP 2/20/2023----CK CONSENT    ANGIOGRAPHY OF LOWER EXTREMITY Right 4/4/2023    Procedure: Angiogram Extremity Unilateral - possible endovascular intervention;  Surgeon: René Simms MD;  Location: [facility] OR 2ND FLR;  Service: Vascular;  Laterality: Right;  19.6 mins.  740.81 mGy  73.3399 Gy.cm  58ml dye  local - 1ml    CREATION OF FEMORAL-FEMORAL ARTERY BYPASS WITH GRAFT Bilateral 3/2/2023    Procedure: CREATION, BYPASS, ARTERIAL, FEMORAL TO FEMORAL, USING GRAFT, LEFT FEMORAL ENDARTERECTOMY, LEFT ILIAC ARTERY STENT PLACEMENT, RIGHT LOWER EXTREMITY ANGIOGRAM AND RIGHT SFA  ANGIOPLASTY;  Surgeon: Danny Dunbar MD;  [facility];  Service: Vascular;  Laterality: Bilateral;   744.47 MGY  175.36 Gycm  Flouro time 20.5 mins      CREATION OF ILIOFEMORAL ARTERY BYPASS Right 3/20/2019    Procedure: CREATION, BYPASS, ARTERIAL, ILIAC TO FEMORAL, RIGHT LOWER EXTREMITY, RIGHT PROFUNDAPLASTY;  Surgeon: Danny Dunbar MD;  Location: [facility];  Service: Vascular;  Laterality: Right;  11:00AM START PER NAZANIN RICHARDS PREOP 3/13/2019  ACT'S, CELL SAVER, GRAFTS, BOOK WATER---NEED H/P  CONSENT IN AM------HGBA1C    CREATION OF MUSCLE ROTATIONAL FLAP Left 3/31/2023    Procedure: CREATION, FLAP, MUSCLE ROTATION;  Surgeon: Brandon Smiley MD;  [facility];  Service: Plastics;  Laterality: Left;    ESOPHAGOGASTRODUODENOSCOPY N/A 3/17/2023    Procedure: EGD (ESOPHAGOGASTRODUODENOSCOPY);  Surgeon: Gee Rodriguez MD;  Location: [facility] ENDO (2ND FLR);  Service: Endoscopy;  Laterality: N/A;    ESOPHAGOGASTRODUODENOSCOPY N/A 4/11/2023    Procedure: EGD (ESOPHAGOGASTRODUODENOSCOPY);  Surgeon: Nazanin Kaiser MD;  Location: [facility] ENDO (2ND FLR);  Service: Endoscopy;  Laterality: N/A;    ESOPHAGOGASTRODUODENOSCOPY N/A 4/26/2023    Procedure: EGD (ESOPHAGOGASTRODUODENOSCOPY);  Surgeon: Gee Rodriguez MD;   Location: [facility] ENDO (2ND FLR);  Service: Endoscopy;  Laterality: N/A;    FOOT AMPUTATION THROUGH METATARSAL Right 3/4/2023    Procedure: AMPUTATION, FOOT, TRANSMETATARSAL;  Surgeon: Benjamin Godfrey DPM;  [facility];  Service: Podiatry;  Laterality: Right;    HYSTERECTOMY      ?unsure cervix present    INCISION AND DRAINAGE Right 2019    Procedure: Incision and Drainage - R groin washout, possible muscle flap;  Surgeon: Danny Dunbar MD;  [facility];  Service: Vascular;  Laterality: Right;  groin washout    INCISION AND DRAINAGE OF GROIN Right 5/3/2019    Procedure: Incision and Drainage R groin;  Surgeon: Danny Dunbar MD;  [facility];  Service: Vascular;  Laterality: Right;    WOUND DEBRIDEMENT Left 3/31/2023    Procedure: DEBRIDEMENT, WOUND;  Surgeon: René Simms MD;  [address];  Service: Vascular;  Laterality: Left;       Social History     Socioeconomic History    Marital status:    Tobacco Use    Smoking status: Former     Types: Cigarettes     Quit date:      Years since quittin.3    Smokeless tobacco: Never   Substance and Sexual Activity    Alcohol use: No    Drug use: No    Sexual activity: Never     Social Determinants of Health     Financial Resource Strain: Unknown    Difficulty of Paying Living Expenses: Patient refused   Food Insecurity: No Food Insecurity    Worried About Running Out of Food in the Last Year: Never true    Ran Out of Food in the Last Year: Never true   Transportation Needs: No Transportation Needs    Lack of Transportation (Medical): No    Lack of Transportation (Non-Medical): No   Physical Activity: Inactive    Days of Exercise per Week: 0 days    Minutes of Exercise per Session: 0 min   Stress: Unknown    Feeling of Stress : Patient refused   Social Connections: Unknown    Frequency of Communication with Friends and Family: More than three times a week     Frequency of Social Gatherings with Friends and Family: More than three times a week    Attends Ep[employer] Services: Patient refused    Active Member of Clubs or Organizations: Patient refused    Attends Club or Organization Meetings: Patient refused    Marital Status:    Housing Stability: Low Risk     Unable to Pay for Housing in the Last Year: No    Number of Places Lived in the Last Year: 1    Unstable Housing in the Last Year: No       OBJECTIVE:     Vital Signs Range (Last 24H):  Temp:  [36.2 °C (97.1 °F)-36.8 °C (98.2 °F)]   Pulse:  [83]   Resp:  [13-18]   BP: (142-147)/(61-73)   SpO2:  [97 %-99 %]       Significant Labs:  Lab Results   Component Value Date    WBC 9.17 05/01/2023    HGB 6.1 (L) 05/01/2023    HCT 20.0 (L) 05/01/2023     05/01/2023    CHOL 109 (L) 02/28/2023    TRIG 58 02/28/2023    HDL 38 (L) 02/28/2023    ALT <5 (L) 05/01/2023    AST 14 05/01/2023     05/01/2023    K 3.8 05/01/2023     05/01/2023    CREATININE 2.1 (H) 05/01/2023    BUN 33 (H) 05/01/2023    CO2 25 05/01/2023    TSH 1.304 04/22/2023    INR 1.2 04/04/2023    HGBA1C 8.2 (H) 02/28/2023       Diagnostic Studies: No relevant studies.    EKG:   Results for orders placed or performed during the hospital encounter of 04/20/23   EKG 12-lead    Collection Time: 04/20/23  9:27 PM    Narrative    Test Reason : R40.0,    Vent. Rate : 082 BPM     Atrial Rate : 082 BPM     P-R Int : 174 ms          QRS Dur : 086 ms      QT Int : 392 ms       P-R-T Axes : 045 037 -03 degrees     QTc Int : 457 ms    Normal sinus rhythm  Normal ECG  When compared with ECG of 29-MAR-2023 20:18,  No significant change was found  Confirmed by Chika St MD ([id number]) on 4/21/2023 1:29:33 PM    Referred By: [email or web address]   SELF           Confirmed By:Chika St MD       2D ECHO:  TTE:  Results for orders placed or performed during the hospital encounter of 02/27/23   Echo   Result Value Ref Range    AV mean gradient 6 mmHg    Ao peak maira  1.61 m/s    Ao VTI 36.93 cm    IVS 1.00 0.6 - 1.1 cm    LA size 3.60 cm    Left Atrium Major Axis 5.11 cm    Left Atrium Minor Axis 4.65 cm    LVIDd 4.42 3.5 - 6.0 cm    LVIDs 3.00 2.1 - 4.0 cm    LVOT diameter 1.98 cm    LVOT peak VTI 30.13 cm    Posterior Wall 0.70 0.6 - 1.1 cm    MV Peak A Angel 1.34 m/s    E wave deceleration time 215.29 msec    MV Peak E Angel 1.40 m/s    RA Major Axis 4.59 cm    RA Width 3.60 cm    RVDD 3.12 cm    Sinus 3.04 cm    TAPSE 1.87 cm    TR Max Angel 3.41 m/s    TDI LATERAL 0.04 m/s    TDI SEPTAL 0.04 m/s    LA WIDTH 3.44 cm    MV stenosis pressure 1/2 time 62.43 ms    LV Diastolic Volume 88.82 mL    LV Systolic Volume 34.87 mL    RV S' 10.87 cm/s    LVOT peak angel 1.20 m/s    LA volume (mod) 73.87 cm3    LV LATERAL E/E' RATIO 35.00 m/s    LV SEPTAL E/E' RATIO 35.00 m/s    FS 32 %    LA volume 51.25 cm3    LV mass 119.47 g    Left Ventricle Relative Wall Thickness 0.32 cm    AV valve area 2.51 cm2    AV Velocity Ratio 0.75     AV index (prosthetic) 0.82     MV valve area p 1/2 method 3.52 cm2    E/A ratio 1.04     Mean e' 0.04 m/s    LVOT area 3.1 cm2    LVOT stroke volume 92.73 cm3    AV peak gradient 10 mmHg    E/E' ratio 35.00 m/s    LV Systolic Volume Index 19.1 mL/m2    LV Diastolic Volume Index 48.54 mL/m2    LA Volume Index 28.0 mL/m2    LV Mass Index 65 g/m2    Triscuspid Valve Regurgitation Peak Gradient 47 mmHg    LA Volume Index (Mod) 40.4 mL/m2    BSA 1.87 m2    Right Atrial Pressure (from IVC) 8 mmHg    EF 65 %    TV rest pulmonary artery pressure 55 mmHg    Narrative    · The estimated ejection fraction is 65%.  · The left ventricle is normal in size with normal systolic function.  · Grade II left ventricular diastolic dysfunction.  · Normal right ventricular size with normal right ventricular systolic   function.  · Mild left atrial enlargement.  · Mild-to-moderate mitral regurgitation.  · There is pulmonary hypertension.  · The estimated PA systolic pressure is 55 mmHg.  ·  Intermediate central venous pressure (8 mmHg).          PHILL:  No results found for this or any previous visit.    ASSESSMENT/PLAN:           Pre-op Assessment    I have reviewed the Patient Summary Reports.     I have reviewed the Nursing Notes. I have reviewed the NPO Status.      Review of Systems  Anesthesia Hx:  No problems with previous Anesthesia  Denies Family Hx of Anesthesia complications.   Denies Personal Hx of Anesthesia complications.   Social:  Former Smoker    Hematology/Oncology:         -- Anemia:   Cardiovascular:   Hypertension CHF PVD    Pulmonary:   Denies COPD.  Denies Asthma.    Hepatic/GI:   Denies PUD. Denies Hiatal Hernia.  Denies GERD.    Neurological:   Denies CVA.    Endocrine:   Diabetes  Denies Obesity / BMI > 30      Physical Exam  General: Well nourished and Cooperative    Airway:  Mallampati: III   Mouth Opening: Normal  TM Distance: Normal  Tongue: Normal  Neck ROM: Normal ROM    Dental:  Dentures        Anesthesia Plan  Type of Anesthesia, risks & benefits discussed:    Anesthesia Type: Gen Natural Airway  Intra-op Monitoring Plan: Standard ASA Monitors  Post Op Pain Control Plan: multimodal analgesia  Induction:  IV  Informed Consent: Informed consent signed with the Patient and all parties understand the risks and agree with anesthesia plan.  All questions answered.   ASA Score: 4  Day of Surgery Review of History & Physical: H&P Update referred to the surgeon/provider.    Ready For Surgery From Anesthesia Perspective.     .

## 2023-05-01 NOTE — PLAN OF CARE
CHW met with patient/family at bedside. Patient experience rounding completed and reviewed the following.     Do you know your discharge plan? Yes  SNF    Have you discussed your needs and preferences with your SW/CM? No    Assigned SW/CM notified of any patient/family needs or concerns.

## 2023-05-01 NOTE — ASSESSMENT & PLAN NOTE
Patient with acute kidney injury likely due to IVVD/dehydration LOLY is currently worsening. Labs reviewed- Renal function/electrolytes with Estimated Creatinine Clearance: 18.6 mL/min (A) (based on SCr of 2.1 mg/dL (H)). according to latest data. Monitor urine output and serial BMP and adjust therapy as needed. Avoid nephrotoxins and renally dose meds for GFR listed above.     Cr 2.1 (baseline 0.8-1.5).  Patient dry on physical exam.  Poor oral intake, thought to be prerenal in origin initially. Possible due to acute on chronic anemia.    Recent Labs   Lab 04/30/23  0436 05/01/23  0543    143   K 3.8 3.8    110   CO2 23 25   BUN 33* 33*   CREATININE 2.1* 2.1*     - Nephrology consulted; appreciate assistance   - Will refer to heme/onc on discharge for further workup of three monoclonal bands seen on the SPE  - [place] placed after patient retaining on bladder scan 4/23 with hematuria 4/24  - Voiding trial 4/27   - RP u/s no hydronephrosis  - Urine lytes showed possible intrinsic etiology  - CMP qd, trend Cr  - strict I/O  - daily weights  - renally dose all medications  - avoid nephrotoxic agents, NSAIDs, IV contrast, ACE/ARB

## 2023-05-01 NOTE — TREATMENT PLAN
Brief GI Treatment Plan    Notified by primary team of recurrent melena and drop in Hgb requiring transfusion. Hemodynamically stable. Aspirin 81 mg restarted yesterday, now held. 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 Will plan for push enteroscopy 5/2. NPO at midnight. Continue IV PPI BID.    Damion Schrader  Gastroenterology Fellow, PGY-IV

## 2023-05-01 NOTE — NURSING
Phoned blood bank to check on status of blood, I was informed pt has antibodies, blood was released in Epic earlier today, will report off to oncoming nurse to call and check on status of blood.

## 2023-05-01 NOTE — ASSESSMENT & PLAN NOTE
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  GI consulted push enteroscopy 4/26 showed red blood in the gastric body and three bleeding angioectasias in the stomach treated with APC  5/1 Drop in Hgb after several melanotic stools. GI reconsulted    - Continue PPI BID  - Maintain 2 large bore Ivs for volume resuscitation  - Trend CBC BID. Transfuse pRBC for Hb < 7 g/dL  - Stopped anticoagulation for now

## 2023-05-01 NOTE — PROGRESS NOTES
[facility] Medicine  Progress Note    Patient Name: Nelsy Marino  MRN: [id number]  Patient Class: IP- Inpatient   Admission Date: 4/20/2023  Length of Stay: 9 days  Attending Physician: Shadi Vidal MD  Primary Care Provider: Juan F Garay        Subjective:     Principal Problem:LOLY (acute kidney injury)        HPI:  This is an 82-year-old female with a past medical history of severe PAD, hypertension, type 2 diabetes, HFpEF (EF 65%), L femoral bypass and L leg flap, R toe ampuations 2 months prior with presenting after  noted waxing/waning confusion since her discharge from hospital two weeks ago. History largely provided by  at bedside as patient is intermittently somnolent. She was discharged on IV Cefepime and Micafungin for her R foot amputation and patient reports adherence with regimen at home. He grew concerned that int he past two weeks she had worsening LLE pain and darkened skin, which is what happened before her R toes were amputated. His niece contacted a nurse who recommneded ED evaluation. No fevers, chills, HA, chest pain, SOB, N/V/D, abdominal pain.    Pt receives  nurse aide, PT/OT since hospital DC and ID very recently made a referral to podiatry and wound care, but appointments not yet made.     In the ED patient was hemodynamically stable and hypertensive, intermittently on RA to 2L NC. Leukocytosis to 13, Hgb 7.5, Cr 2.1, Albumin 2.1, BCx in process, CT head and CXR wnl. Started on BS Abx with continued Cefepime, [place], started on Vanc pending Bcx, US LE arterial with diminished flow throughout the left lower extremity. Monophasic waveforms suggesting atherosclerotic disease. Admitted to  for LOLY and Podiatry/Wound care.       Overview/Hospital Course:  Admitted to hospital medicine for LOLY and left foot pain. Cr on admission 2.1 (bl.8-1.5), improved with IVF initially. After holding IVF Cr increased, and then unresponsive to fluids. Patient with  significant urine retention, rivas placed and Cr again increased. Consulted Nephrology for assistance, suspect due to acute drop in Hgb. LOLY improved 4/27. Nephrology recommended heme/onc evaluation for this patient regarding the three monoclonal bands seen on SPE. Spoke with heme/onc regarding case, labs ordered in anticipation of outpatient evaluation given that patient stable. Patient having increased soft bowel movements with melena 4/25. GI consulted performed EGD with small bowel follow through 4/26 revealed red blood in the gastric body with three bleeding angioectasias in the stomach treated with APC. DAPT held in setting of acute bleed. Discussed concern of rebleeding AVM possibly due to DAPT with vascular. ASA trial for 1 day after patient with 2-3 days without melena resulted in 5 dark tarry BM. ASA d/c and GI reconsulted after Hgb from 8 to 6.1 overnight 5/1.    Vascular surgery consulted on admission, recommended repeat ARCHIE and left foot arterial doppler. Podiatry also saw the patient for left foot wound, recommended wound vac placement. Both Vascular and Podiatry felt no further intervention necessary at this time.     ID consulted, patient was receiving cefepime and micafungin outpatient per ID following R foot transmetatarsal amputation. Started on vanc, cefepime, and micafungin. Patient more lethargic 4/22 although able to follow commands and move extremities on exam. With concern for cefepime toxicity, ID removed cefepime and vancomycin. Micafungin and zosyn continued. EEG and CTH ordered, no acute abnormalities. Patient significantly improved following d/c Cefepime to baseline. Patient to continue zosyn and micafungin until ID follow up 5/17 and then continue keflex ppx thereafter per ID.    SLP consulted recommended pureed dysphagia diet. PT/OT recommended SNF placement. Social work assisting with placement.      Interval History: Patient had melena yesterday and tovernight with a  drop in Hgb  8->6.1. Will recheck CBC after pRBC given. Remains hemodynamically stable.    Review of Systems   Constitutional:  Negative for activity change, appetite change and fever.   HENT:  Negative for congestion and sore throat.    Respiratory:  Negative for cough, shortness of breath and wheezing.    Cardiovascular:  Negative for chest pain and leg swelling.   Gastrointestinal:  Negative for abdominal pain, constipation, diarrhea, nausea and vomiting.        Dark stools   Genitourinary:  Negative for difficulty urinating and dysuria.   Musculoskeletal:  Positive for arthralgias and myalgias.   Skin:  Positive for color change and wound. Negative for rash.   Allergic/Immunologic: Negative.    Neurological:  Negative for dizziness and headaches.   Hematological: Negative.    Objective:     Vital Signs (Most Recent):  Temp: 97.2 °F (36.2 °C) (05/01/23 1054)  Pulse: 83 (05/01/23 0700)  Resp: 15 (05/01/23 1041)  BP: (!) 142/73 (05/01/23 0700)  SpO2: 99 % (05/01/23 0700)   Vital Signs (24h Range):  Temp:  [96.4 °F (35.8 °C)-98.2 °F (36.8 °C)] 97.2 °F (36.2 °C)  Pulse:  [78-83] 83  Resp:  [13-20] 15  SpO2:  [95 %-99 %] 99 %  BP: (142-147)/(61-73) 142/73     Weight: 68 kg (149 lb 14.6 oz)  Body mass index is 24.95 kg/m².  No intake or output data in the 24 hours ending 05/01/23 1323     Physical Exam  Vitals and nursing note reviewed.   Constitutional:       General: She is not in acute distress.     Appearance: She is not ill-appearing.   HENT:      Head: Normocephalic and atraumatic.      Mouth/Throat:      Mouth: Mucous membranes are moist.      Pharynx: Oropharynx is clear.   Eyes:      Extraocular Movements: Extraocular movements intact.      Conjunctiva/sclera: Conjunctivae normal.   Cardiovascular:      Rate and Rhythm: Normal rate and regular rhythm.      Pulses: Normal pulses.   Pulmonary:      Effort: Pulmonary effort is normal. No respiratory distress.      Breath sounds: No wheezing or rales.   Abdominal:       Palpations: Abdomen is soft.      Tenderness: There is no abdominal tenderness. There is no guarding.   Musculoskeletal:      Cervical back: Normal range of motion and neck supple.      Right lower leg: No edema.      Left lower leg: No edema.   Skin:     General: Skin is warm.      Findings: Lesion (R heel, L dorsum with darkened skin) present.      Comments: Insicion on right inner thigh- c/d/I- no drainage or erythema seen.  Wound on left thigh- with dressing on top and drain in place with serosanguinous fluid.    Neurological:      General: No focal deficit present.      Mental Status: She is oriented to person, place, and time. Mental status is at baseline.       Significant Labs: All pertinent labs within the past 24 hours have been reviewed.  CBC:   Recent Labs   Lab 04/30/23  1438 04/30/23  1606 05/01/23  0543   WBC 9.38 9.21 9.17   HGB 7.0* 8.2* 6.1*   HCT 22.8* 30.4* 20.0*    223 263       CMP:   Recent Labs   Lab 04/30/23  0436 05/01/23  0543    143   K 3.8 3.8    110   CO2 23 25   * 119*   BUN 33* 33*   CREATININE 2.1* 2.1*   CALCIUM 8.9 8.9   PROT 6.9 6.9   ALBUMIN 1.9* 2.0*   BILITOT 0.3 0.3   ALKPHOS 55 50*   AST 15 14   ALT 5* <5*   ANIONGAP 9 8         Significant Imaging: I have reviewed all pertinent imaging results/findings within the past 24 hours.      Assessment/Plan:      * LOLY (acute kidney injury)  Patient with acute kidney injury likely due to IVVD/dehydration LOLY is currently worsening. Labs reviewed- Renal function/electrolytes with Estimated Creatinine Clearance: 18.6 mL/min (A) (based on SCr of 2.1 mg/dL (H)). according to latest data. Monitor urine output and serial BMP and adjust therapy as needed. Avoid nephrotoxins and renally dose meds for GFR listed above.     Cr 2.1 (baseline 0.8-1.5).  Patient dry on physical exam.  Poor oral intake, thought to be prerenal in origin initially. Possible due to acute on chronic anemia.    Recent Labs   Lab 04/30/23  0436  05/01/23  0543    143   K 3.8 3.8    110   CO2 23 25   BUN 33* 33*   CREATININE 2.1* 2.1*     - Nephrology consulted; appreciate assistance   - Will refer to heme/onc on discharge for further workup of three monoclonal bands seen on the SPE  - [place] placed after patient retaining on bladder scan 4/23 with hematuria 4/24  - Voiding trial 4/27   - RP u/s no hydronephrosis  - Urine lytes showed possible intrinsic etiology  - CMP qd, trend Cr  - strict I/O  - daily weights  - renally dose all medications  - avoid nephrotoxic agents, NSAIDs, IV contrast, ACE/ARB      Debility  - PT/OT reconsulted  - Fall precautions  - Established with HH PT/OT/nurse aide prior to hospitalization  - SW assisting in SNF placement       Skin ulcer of right heel, limited to breakdown of skin  See PVD for plan    - Wound care and podiatry consulted with appreciated mgmt recs      Acute on chronic anemia  Baseline 8.6-11.   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  GI consulted push enteroscopy 4/26 showed red blood in the gastric body and three bleeding angioectasias in the stomach treated with APC  5/1 Drop in Hgb after several melanotic stools. GI reconsulted    - Continue PPI BID  - Maintain 2 large bore Ivs for volume resuscitation  - Trend CBC BID. Transfuse pRBC for Hb < 7 g/dL  - Stopped anticoagulation for now        Acute encephalopathy  A&O x3 on admission with waxing/waning per  at the bedside.  says she has been largely bed  bound since discharge, but her waxing confusion was concerning to him. Patients mentation significantly improved after stopping cefepime.  Resolved; likely 2/2 cefepime neurotoxicity   - Repeat CT Head with no acute change  - EEG without seizures  - TSH, B12, B1, Vit E, Ammonia, RPR were unremarkable  - Cefepime changed to Zosyn  - F/u blood and Urine cultures      S/P transmetatarsal amputation of foot, right  Appreciated evaluation by Podiatry      Diastolic CHF, chronic  Patient is identified as having Diastolic (HFpEF) heart failure that is Chronic. CHF is currently controlled. Latest ECHO performed and demonstrates- Results for orders placed during the hospital encounter of 02/27/23    Echo    Interpretation Summary  · The estimated ejection fraction is 65%.  · The left ventricle is normal in size with normal systolic function.  · Grade II left ventricular diastolic dysfunction.  · Normal right ventricular size with normal right ventricular systolic function.  · Mild left atrial enlargement.  · Mild-to-moderate mitral regurgitation.  · There is pulmonary hypertension.  · The estimated PA systolic pressure is 55 mmHg.  · Intermediate central venous pressure (8 mmHg).  . Continue Beta Blocker and Furosemide and monitor clinical status closely. Monitor on telemetry. Patient is off CHF pathway.  Monitor strict Is&Os and daily weights.  Place on fluid restriction of 1.5 L. Continue to stress to patient importance of self efficacy and  on diet for CHF. Last BNP reviewed- and noted below No results for input(s): BNP, BNPTRIAGEBLO in the last 168 hours..    - Holding home Lasix in setting of LOLY  - Holding Hyzaar (HCTZ) in setting of LOLY  - Patient breathing comfortably on RA, no signs of volume overload      Type 2 diabetes mellitus with chronic kidney disease, with long-term current use of insulin  Patient's FSGs are uncontrolled due to hyperglycemia on current medication regimen.  Last A1c reviewed-   Lab  Results   Component Value Date    HGBA1C 8.2 (H) 02/28/2023     Most recent fingerstick glucose reviewed-   Recent Labs   Lab 04/27/23  1532 04/27/23  2031 04/28/23  0724   POCTGLUCOSE 189* 188* 157*     Current correctional scale  Low  Maintain anti-hyperglycemic dose as follows-   Antihyperglycemics (From admission, onward)    Start     Stop Route Frequency Ordered    04/21/23 0355  insulin aspart U-100 pen 0-5 Units         -- SubQ Before meals & nightly PRN 04/21/23 0255        Hold Oral hypoglycemics while patient is in the hospital.    Essential hypertension  - Continue home Coreg, amlodipine  - hold Hyzaar in setting of LOLY      PAD (peripheral artery disease)  Recently discharged home with IV Cefepime 2g Q12 hours + IV Micafungin 100mg Q24 hours for estimated end of therapy date of 5/16/2023. For R toe amputation. Patient's  reports adherence with IV Abx. L foot with darkening skin changes, which alarmed  as this was how skin presented prior to toe amputations of R foot. RLE warm to touch with L US Lower Extremity Arteries demonstrating diminished flow throughout the left lower extremity. Monophasic waveforms suggesting atherosclerotic disease.     - Podiatry and Vascular Surgery following; appreciate recs  - Restarted ASA 4/29, but discontinued after melena this morning. Will recheck CBC, if hgb is stable, will resume ASA.  - Continue Micafungin and Zosyn per prior ID recs until 5/17 to then continue Keflex ppx per ID thereafter for graft infection      VTE Risk Mitigation (From admission, onward)         Ordered     IP VTE HIGH RISK PATIENT  Once         04/21/23 0254     Place sequential compression device  Until discontinued         04/21/23 0254                Discharge Planning   JUAN ALBERTO: 5/1/2023     Code Status: Full Code   Is the patient medically ready for discharge?: No    Reason for patient still in hospital (select all that apply): Patient new problem, Patient trending condition,  Consult recommendations and Pending disposition  Discharge Plan A: Skilled Nursing Facility                  Boris Mckeon DO  Department of Hospital Medicine   [facility] S[facility]

## 2023-05-01 NOTE — PT/OT/SLP PROGRESS
Physical Therapy Treatment    Patient Name:  Nelsy Marino   MRN:  [id number]    Recommendations:     Discharge Recommendations: nursing facility, skilled  Discharge Equipment Recommendations: none  Barriers to discharge: Inaccessible home    Assessment:     Nelsy Marino is a 82 y.o. female admitted with a medical diagnosis of LOLY (acute kidney injury).  She presents with the following impairments/functional limitations: weakness, impaired endurance, impaired self care skills, impaired functional mobility, gait instability, impaired balance, decreased coordination, decreased lower extremity function, decreased safety awareness, pain, decreased ROM, impaired skin, impaired cardiopulmonary response to activity, orthopedic precautions . Patient was able to show improved tolerance to static standing balance and was able to take steps to HOB, but fatigued very easily.    Rehab Prognosis: Fair; patient would benefit from acute skilled PT services to address these deficits and reach maximum level of function.    Recent Surgery: Procedure(s) (LRB):  EGD (ESOPHAGOGASTRODUODENOSCOPY) (N/A) 5 Days Post-Op    Plan:     During this hospitalization, patient to be seen 3 x/week to address the identified rehab impairments via therapeutic activities, therapeutic exercises, neuromuscular re-education and progress toward the following goals:    Plan of Care Expires:  05/20/23    Subjective     Chief Complaint: pain on her feet, but only when moving.  Patient/Family Comments/goals: to heal well  Pain/Comfort:  Pain Rating 1:  (pain only with mobility or sudden movement)  Location - Side 1: Bilateral  Location - Orientation 1: generalized  Location 1: foot  Pain Addressed 1: Reposition, Distraction, Cessation of Activity  Pain Rating Post-Intervention 1: 0/10      Objective:     Communicated with NSG prior to session.  Patient found HOB elevated with telemetry, pulse ox (continuous), wound vac, WILTON drain upon PT entry to room.      General Precautions: Standard, fall  Orthopedic Precautions:  (B LE WBAT on Shannan shoes)  Braces:  (Darco Shoes)  Respiratory Status: Room air     Functional Mobility:  Bed Mobility:     Rolling Right: stand by assistance  Scooting: minimum assistance  Supine to Sit: stand by assistance and contact guard assistance  Sit to Supine: stand by assistance and contact guard assistance  Transfers:     Sit to Stand:  moderate assistance with rolling walker  Gait: 3 small steps x 2 trials with RW and max assistance.      AM-PAC 6 CLICK MOBILITY  Turning over in bed (including adjusting bedclothes, sheets and blankets)?: 4  Sitting down on and standing up from a chair with arms (e.g., wheelchair, bedside commode, etc.): 2  Moving from lying on back to sitting on the side of the bed?: 3  Moving to and from a bed to a chair (including a wheelchair)?: 2  Need to walk in hospital room?: 2  Climbing 3-5 steps with a railing?: 1  Basic Mobility Total Score: 14       Treatment & Education:  Donned/Doffed Darco Shoes. Patient sat at EOB x 20 minutes with supervision only. Static standing balance with RW for support 4 x 30-35 secs with min to mod assistance. Patient repositioned in bed after treatment for comfort. Educated patient and sister-in-law on the importance of repositioning and offloading heel on a pillow to prevent Decubitus.    Patient left HOB elevated with all lines intact, call button in reach, and family present.    GOALS:   Multidisciplinary Problems       Physical Therapy Goals          Problem: Physical Therapy    Goal Priority Disciplines Outcome Goal Variances Interventions   Physical Therapy Goal     PT, PT/OT Ongoing, Progressing     Description: Goals to be met by: 5/10/23     Patient will increase functional independence with mobility by performin. Supine to sit with Moderate Assistance  2. Sit to stand transfer with Moderate Assistance  3. Bed to chair transfer with Moderate Assistance using AD if  needed and Darco shoe  4. Sitting at edge of bed x10 minutes with Minimal Assistance to perform functional mobility.                          Time Tracking:     PT Received On: 05/01/23  PT Start Time: 1417     PT Stop Time: 1442  PT Total Time (min): 25 min     Billable Minutes: Therapeutic Activity 13 and Therapeutic Exercise 12    Treatment Type: Treatment  PT/PTA: PTA     Number of PTA visits since last PT visit: 2     05/01/2023

## 2023-05-01 NOTE — SUBJECTIVE & OBJECTIVE
Interval History: Patient had melena yesterday and tovernight with a  drop in Hgb 8->6.1. Will recheck CBC after pRBC given. Remains hemodynamically stable.    Review of Systems   Constitutional:  Negative for activity change, appetite change and fever.   HENT:  Negative for congestion and sore throat.    Respiratory:  Negative for cough, shortness of breath and wheezing.    Cardiovascular:  Negative for chest pain and leg swelling.   Gastrointestinal:  Negative for abdominal pain, constipation, diarrhea, nausea and vomiting.        Dark stools   Genitourinary:  Negative for difficulty urinating and dysuria.   Musculoskeletal:  Positive for arthralgias and myalgias.   Skin:  Positive for color change and wound. Negative for rash.   Allergic/Immunologic: Negative.    Neurological:  Negative for dizziness and headaches.   Hematological: Negative.    Objective:     Vital Signs (Most Recent):  Temp: 97.2 °F (36.2 °C) (05/01/23 1054)  Pulse: 83 (05/01/23 0700)  Resp: 15 (05/01/23 1041)  BP: (!) 142/73 (05/01/23 0700)  SpO2: 99 % (05/01/23 0700)   Vital Signs (24h Range):  Temp:  [96.4 °F (35.8 °C)-98.2 °F (36.8 °C)] 97.2 °F (36.2 °C)  Pulse:  [78-83] 83  Resp:  [13-20] 15  SpO2:  [95 %-99 %] 99 %  BP: (142-147)/(61-73) 142/73     Weight: 68 kg (149 lb 14.6 oz)  Body mass index is 24.95 kg/m².  No intake or output data in the 24 hours ending 05/01/23 1323     Physical Exam  Vitals and nursing note reviewed.   Constitutional:       General: She is not in acute distress.     Appearance: She is not ill-appearing.   HENT:      Head: Normocephalic and atraumatic.      Mouth/Throat:      Mouth: Mucous membranes are moist.      Pharynx: Oropharynx is clear.   Eyes:      Extraocular Movements: Extraocular movements intact.      Conjunctiva/sclera: Conjunctivae normal.   Cardiovascular:      Rate and Rhythm: Normal rate and regular rhythm.      Pulses: Normal pulses.   Pulmonary:      Effort: Pulmonary effort is normal. No  respiratory distress.      Breath sounds: No wheezing or rales.   Abdominal:      Palpations: Abdomen is soft.      Tenderness: There is no abdominal tenderness. There is no guarding.   Musculoskeletal:      Cervical back: Normal range of motion and neck supple.      Right lower leg: No edema.      Left lower leg: No edema.   Skin:     General: Skin is warm.      Findings: Lesion (R heel, L dorsum with darkened skin) present.      Comments: Insicion on right inner thigh- c/d/I- no drainage or erythema seen.  Wound on left thigh- with dressing on top and drain in place with serosanguinous fluid.    Neurological:      General: No focal deficit present.      Mental Status: She is oriented to person, place, and time. Mental status is at baseline.       Significant Labs: All pertinent labs within the past 24 hours have been reviewed.  CBC:   Recent Labs   Lab 04/30/23  1438 04/30/23  1606 05/01/23  0543   WBC 9.38 9.21 9.17   HGB 7.0* 8.2* 6.1*   HCT 22.8* 30.4* 20.0*    223 263       CMP:   Recent Labs   Lab 04/30/23  0436 05/01/23  0543    143   K 3.8 3.8    110   CO2 23 25   * 119*   BUN 33* 33*   CREATININE 2.1* 2.1*   CALCIUM 8.9 8.9   PROT 6.9 6.9   ALBUMIN 1.9* 2.0*   BILITOT 0.3 0.3   ALKPHOS 55 50*   AST 15 14   ALT 5* <5*   ANIONGAP 9 8         Significant Imaging: I have reviewed all pertinent imaging results/findings within the past 24 hours.

## 2023-05-02 ENCOUNTER — DOCUMENT SCAN (OUTPATIENT)
Dept: HOME HEALTH SERVICES | Facility: HOSPITAL | Age: 83
End: 2023-05-02

## 2023-05-02 ENCOUNTER — ANESTHESIA (OUTPATIENT)
Dept: ENDOSCOPY | Facility: HOSPITAL | Age: 83
DRG: 682 | End: 2023-05-02
Payer: MEDICARE

## 2023-05-02 ENCOUNTER — DOCUMENT SCAN (OUTPATIENT)
Dept: HOME HEALTH SERVICES | Facility: HOSPITAL | Age: 83
End: 2023-05-02
Payer: MEDICARE

## 2023-05-02 ENCOUNTER — TELEPHONE (OUTPATIENT)
Dept: VASCULAR SURGERY | Facility: CLINIC | Age: 83
End: 2023-05-02
Payer: MEDICARE

## 2023-05-02 LAB
ALBUMIN SERPL BCP-MCNC: 2.2 G/DL (ref 3.5–5.2)
ALP SERPL-CCNC: 50 U/L (ref 55–135)
ALT SERPL W/O P-5'-P-CCNC: <5 U/L (ref 10–44)
ANION GAP SERPL CALC-SCNC: 8 MMOL/L (ref 8–16)
AST SERPL-CCNC: 15 U/L (ref 10–40)
BASOPHILS # BLD AUTO: 0.02 K/UL (ref 0–0.2)
BASOPHILS # BLD AUTO: 0.02 K/UL (ref 0–0.2)
BASOPHILS NFR BLD: 0.2 % (ref 0–1.9)
BASOPHILS NFR BLD: 0.2 % (ref 0–1.9)
BILIRUB SERPL-MCNC: 1 MG/DL (ref 0.1–1)
BUN SERPL-MCNC: 29 MG/DL (ref 8–23)
CALCIUM SERPL-MCNC: 9 MG/DL (ref 8.7–10.5)
CHLORIDE SERPL-SCNC: 108 MMOL/L (ref 95–110)
CO2 SERPL-SCNC: 26 MMOL/L (ref 23–29)
CREAT SERPL-MCNC: 2 MG/DL (ref 0.5–1.4)
DIFFERENTIAL METHOD: ABNORMAL
DIFFERENTIAL METHOD: ABNORMAL
EOSINOPHIL # BLD AUTO: 0.5 K/UL (ref 0–0.5)
EOSINOPHIL # BLD AUTO: 0.7 K/UL (ref 0–0.5)
EOSINOPHIL NFR BLD: 5.2 % (ref 0–8)
EOSINOPHIL NFR BLD: 8.4 % (ref 0–8)
ERYTHROCYTE [DISTWIDTH] IN BLOOD BY AUTOMATED COUNT: 14.3 % (ref 11.5–14.5)
ERYTHROCYTE [DISTWIDTH] IN BLOOD BY AUTOMATED COUNT: 14.6 % (ref 11.5–14.5)
EST. GFR  (NO RACE VARIABLE): 24.5 ML/MIN/1.73 M^2
GLUCOSE SERPL-MCNC: 126 MG/DL (ref 70–110)
HCT VFR BLD AUTO: 27.5 % (ref 37–48.5)
HCT VFR BLD AUTO: 30.1 % (ref 37–48.5)
HGB BLD-MCNC: 8.4 G/DL (ref 12–16)
HGB BLD-MCNC: 9.1 G/DL (ref 12–16)
IMM GRANULOCYTES # BLD AUTO: 0.02 K/UL (ref 0–0.04)
IMM GRANULOCYTES # BLD AUTO: 0.02 K/UL (ref 0–0.04)
IMM GRANULOCYTES NFR BLD AUTO: 0.2 % (ref 0–0.5)
IMM GRANULOCYTES NFR BLD AUTO: 0.2 % (ref 0–0.5)
LYMPHOCYTES # BLD AUTO: 1.4 K/UL (ref 1–4.8)
LYMPHOCYTES # BLD AUTO: 1.6 K/UL (ref 1–4.8)
LYMPHOCYTES NFR BLD: 14.8 % (ref 18–48)
LYMPHOCYTES NFR BLD: 18.8 % (ref 18–48)
MAGNESIUM SERPL-MCNC: 1.8 MG/DL (ref 1.6–2.6)
MCH RBC QN AUTO: 26.3 PG (ref 27–31)
MCH RBC QN AUTO: 26.4 PG (ref 27–31)
MCHC RBC AUTO-ENTMCNC: 30.2 G/DL (ref 32–36)
MCHC RBC AUTO-ENTMCNC: 30.5 G/DL (ref 32–36)
MCV RBC AUTO: 87 FL (ref 82–98)
MCV RBC AUTO: 87 FL (ref 82–98)
MONOCYTES # BLD AUTO: 0.4 K/UL (ref 0.3–1)
MONOCYTES # BLD AUTO: 0.4 K/UL (ref 0.3–1)
MONOCYTES NFR BLD: 3.8 % (ref 4–15)
MONOCYTES NFR BLD: 4.3 % (ref 4–15)
NEUTROPHILS # BLD AUTO: 5.7 K/UL (ref 1.8–7.7)
NEUTROPHILS # BLD AUTO: 6.9 K/UL (ref 1.8–7.7)
NEUTROPHILS NFR BLD: 68.1 % (ref 38–73)
NEUTROPHILS NFR BLD: 75.8 % (ref 38–73)
NRBC BLD-RTO: 0 /100 WBC
NRBC BLD-RTO: 0 /100 WBC
PLATELET # BLD AUTO: 228 K/UL (ref 150–450)
PLATELET # BLD AUTO: 245 K/UL (ref 150–450)
PMV BLD AUTO: 10.7 FL (ref 9.2–12.9)
PMV BLD AUTO: 11.2 FL (ref 9.2–12.9)
POCT GLUCOSE: 154 MG/DL (ref 70–110)
POCT GLUCOSE: 161 MG/DL (ref 70–110)
POCT GLUCOSE: 172 MG/DL (ref 70–110)
POCT GLUCOSE: 190 MG/DL (ref 70–110)
POCT GLUCOSE: 228 MG/DL (ref 70–110)
POTASSIUM SERPL-SCNC: 3.8 MMOL/L (ref 3.5–5.1)
PROT SERPL-MCNC: 7.3 G/DL (ref 6–8.4)
RBC # BLD AUTO: 3.18 M/UL (ref 4–5.4)
RBC # BLD AUTO: 3.46 M/UL (ref 4–5.4)
SARS-COV-2 RNA RESP QL NAA+PROBE: NOT DETECTED
SODIUM SERPL-SCNC: 142 MMOL/L (ref 136–145)
WBC # BLD AUTO: 8.36 K/UL (ref 3.9–12.7)
WBC # BLD AUTO: 9.1 K/UL (ref 3.9–12.7)

## 2023-05-02 PROCEDURE — 63600175 PHARM REV CODE 636 W HCPCS: Performed by: NURSE ANESTHETIST, CERTIFIED REGISTERED

## 2023-05-02 PROCEDURE — D9220A PRA ANESTHESIA: Mod: CRNA,,, | Performed by: NURSE ANESTHETIST, CERTIFIED REGISTERED

## 2023-05-02 PROCEDURE — 63600175 PHARM REV CODE 636 W HCPCS

## 2023-05-02 PROCEDURE — 25000003 PHARM REV CODE 250

## 2023-05-02 PROCEDURE — 27202087 HC PROBE, APC: Performed by: INTERNAL MEDICINE

## 2023-05-02 PROCEDURE — C9113 INJ PANTOPRAZOLE SODIUM, VIA: HCPCS | Performed by: STUDENT IN AN ORGANIZED HEALTH CARE EDUCATION/TRAINING PROGRAM

## 2023-05-02 PROCEDURE — 36415 COLL VENOUS BLD VENIPUNCTURE: CPT | Performed by: STUDENT IN AN ORGANIZED HEALTH CARE EDUCATION/TRAINING PROGRAM

## 2023-05-02 PROCEDURE — 43255 PR EGD, FLEX, W/CTRL BLEED, ANY METHOD: ICD-10-PCS | Mod: GC,,, | Performed by: INTERNAL MEDICINE

## 2023-05-02 PROCEDURE — 85025 COMPLETE CBC W/AUTO DIFF WBC: CPT | Performed by: STUDENT IN AN ORGANIZED HEALTH CARE EDUCATION/TRAINING PROGRAM

## 2023-05-02 PROCEDURE — 43255 EGD CONTROL BLEEDING ANY: CPT | Mod: GC,,, | Performed by: INTERNAL MEDICINE

## 2023-05-02 PROCEDURE — 25000003 PHARM REV CODE 250: Performed by: INTERNAL MEDICINE

## 2023-05-02 PROCEDURE — 99233 PR SUBSEQUENT HOSPITAL CARE,LEVL III: ICD-10-PCS | Mod: GC,,, | Performed by: STUDENT IN AN ORGANIZED HEALTH CARE EDUCATION/TRAINING PROGRAM

## 2023-05-02 PROCEDURE — 25000003 PHARM REV CODE 250: Performed by: NURSE ANESTHETIST, CERTIFIED REGISTERED

## 2023-05-02 PROCEDURE — U0005 INFEC AGEN DETEC AMPLI PROBE: HCPCS | Performed by: STUDENT IN AN ORGANIZED HEALTH CARE EDUCATION/TRAINING PROGRAM

## 2023-05-02 PROCEDURE — 82962 GLUCOSE BLOOD TEST: CPT | Performed by: INTERNAL MEDICINE

## 2023-05-02 PROCEDURE — D9220A PRA ANESTHESIA: ICD-10-PCS | Mod: CRNA,,, | Performed by: NURSE ANESTHETIST, CERTIFIED REGISTERED

## 2023-05-02 PROCEDURE — 63600175 PHARM REV CODE 636 W HCPCS: Performed by: STUDENT IN AN ORGANIZED HEALTH CARE EDUCATION/TRAINING PROGRAM

## 2023-05-02 PROCEDURE — 94761 N-INVAS EAR/PLS OXIMETRY MLT: CPT

## 2023-05-02 PROCEDURE — 97535 SELF CARE MNGMENT TRAINING: CPT | Mod: CO

## 2023-05-02 PROCEDURE — 37000009 HC ANESTHESIA EA ADD 15 MINS: Performed by: INTERNAL MEDICINE

## 2023-05-02 PROCEDURE — 25000003 PHARM REV CODE 250: Performed by: STUDENT IN AN ORGANIZED HEALTH CARE EDUCATION/TRAINING PROGRAM

## 2023-05-02 PROCEDURE — 80053 COMPREHEN METABOLIC PANEL: CPT | Performed by: STUDENT IN AN ORGANIZED HEALTH CARE EDUCATION/TRAINING PROGRAM

## 2023-05-02 PROCEDURE — 43255 EGD CONTROL BLEEDING ANY: CPT | Mod: GC | Performed by: INTERNAL MEDICINE

## 2023-05-02 PROCEDURE — 11000001 HC ACUTE MED/SURG PRIVATE ROOM

## 2023-05-02 PROCEDURE — 83735 ASSAY OF MAGNESIUM: CPT | Performed by: STUDENT IN AN ORGANIZED HEALTH CARE EDUCATION/TRAINING PROGRAM

## 2023-05-02 PROCEDURE — 97530 THERAPEUTIC ACTIVITIES: CPT | Mod: CO

## 2023-05-02 PROCEDURE — D9220A PRA ANESTHESIA: ICD-10-PCS | Mod: ANES,,, | Performed by: ANESTHESIOLOGY

## 2023-05-02 PROCEDURE — D9220A PRA ANESTHESIA: Mod: ANES,,, | Performed by: ANESTHESIOLOGY

## 2023-05-02 PROCEDURE — 99900035 HC TECH TIME PER 15 MIN (STAT)

## 2023-05-02 PROCEDURE — 37000008 HC ANESTHESIA 1ST 15 MINUTES: Performed by: INTERNAL MEDICINE

## 2023-05-02 PROCEDURE — 99233 SBSQ HOSP IP/OBS HIGH 50: CPT | Mod: GC,,, | Performed by: STUDENT IN AN ORGANIZED HEALTH CARE EDUCATION/TRAINING PROGRAM

## 2023-05-02 RX ORDER — PROPOFOL 10 MG/ML
VIAL (ML) INTRAVENOUS CONTINUOUS PRN
Status: DISCONTINUED | OUTPATIENT
Start: 2023-05-02 | End: 2023-05-02

## 2023-05-02 RX ORDER — PANTOPRAZOLE SODIUM 40 MG/1
40 TABLET, DELAYED RELEASE ORAL
Status: DISCONTINUED | OUTPATIENT
Start: 2023-05-02 | End: 2023-05-05 | Stop reason: HOSPADM

## 2023-05-02 RX ORDER — SODIUM CHLORIDE 0.9 % (FLUSH) 0.9 %
3 SYRINGE (ML) INJECTION
Status: DISCONTINUED | OUTPATIENT
Start: 2023-05-02 | End: 2023-05-02 | Stop reason: HOSPADM

## 2023-05-02 RX ORDER — PROPOFOL 10 MG/ML
VIAL (ML) INTRAVENOUS
Status: DISCONTINUED | OUTPATIENT
Start: 2023-05-02 | End: 2023-05-02

## 2023-05-02 RX ORDER — LIDOCAINE HYDROCHLORIDE 20 MG/ML
INJECTION INTRAVENOUS
Status: DISCONTINUED | OUTPATIENT
Start: 2023-05-02 | End: 2023-05-02

## 2023-05-02 RX ADMIN — PIPERACILLIN SODIUM AND TAZOBACTAM SODIUM 4.5 G: 4; .5 INJECTION, POWDER, FOR SOLUTION INTRAVENOUS at 02:05

## 2023-05-02 RX ADMIN — ATORVASTATIN CALCIUM 40 MG: 40 TABLET, FILM COATED ORAL at 08:05

## 2023-05-02 RX ADMIN — MICAFUNGIN SODIUM 100 MG: 100 INJECTION, POWDER, LYOPHILIZED, FOR SOLUTION INTRAVENOUS at 11:05

## 2023-05-02 RX ADMIN — PROPOFOL 20 MG: 10 INJECTION, EMULSION INTRAVENOUS at 10:05

## 2023-05-02 RX ADMIN — PANTOPRAZOLE SODIUM 40 MG: 40 INJECTION, POWDER, FOR SOLUTION INTRAVENOUS at 09:05

## 2023-05-02 RX ADMIN — OXYCODONE HYDROCHLORIDE 5 MG: 5 TABLET ORAL at 06:05

## 2023-05-02 RX ADMIN — PANTOPRAZOLE SODIUM 40 MG: 40 TABLET, DELAYED RELEASE ORAL at 05:05

## 2023-05-02 RX ADMIN — ACETAMINOPHEN 1000 MG: 500 TABLET ORAL at 02:05

## 2023-05-02 RX ADMIN — PIPERACILLIN SODIUM AND TAZOBACTAM SODIUM 4.5 G: 4; .5 INJECTION, POWDER, FOR SOLUTION INTRAVENOUS at 03:05

## 2023-05-02 RX ADMIN — SODIUM CHLORIDE: 0.9 INJECTION, SOLUTION INTRAVENOUS at 10:05

## 2023-05-02 RX ADMIN — COLLAGENASE SANTYL: 250 OINTMENT TOPICAL at 09:05

## 2023-05-02 RX ADMIN — ACETAMINOPHEN 1000 MG: 500 TABLET ORAL at 05:05

## 2023-05-02 RX ADMIN — LOPERAMIDE HYDROCHLORIDE 2 MG: 2 CAPSULE ORAL at 08:05

## 2023-05-02 RX ADMIN — CARVEDILOL 12.5 MG: 12.5 TABLET, FILM COATED ORAL at 09:05

## 2023-05-02 RX ADMIN — AMLODIPINE BESYLATE 10 MG: 10 TABLET ORAL at 09:05

## 2023-05-02 RX ADMIN — Medication: at 09:05

## 2023-05-02 RX ADMIN — PROPOFOL 50 MG: 10 INJECTION, EMULSION INTRAVENOUS at 10:05

## 2023-05-02 RX ADMIN — CHOLECALCIFEROL TAB 25 MCG (1000 UNIT) 1000 UNITS: 25 TAB at 09:05

## 2023-05-02 RX ADMIN — DULOXETINE 30 MG: 30 CAPSULE, DELAYED RELEASE ORAL at 09:05

## 2023-05-02 RX ADMIN — OXYCODONE HYDROCHLORIDE 5 MG: 5 TABLET ORAL at 08:05

## 2023-05-02 RX ADMIN — ACETAMINOPHEN 1000 MG: 500 TABLET ORAL at 08:05

## 2023-05-02 RX ADMIN — INSULIN ASPART 1 UNITS: 100 INJECTION, SOLUTION INTRAVENOUS; SUBCUTANEOUS at 09:05

## 2023-05-02 RX ADMIN — CARVEDILOL 12.5 MG: 12.5 TABLET, FILM COATED ORAL at 08:05

## 2023-05-02 RX ADMIN — DULOXETINE 30 MG: 30 CAPSULE, DELAYED RELEASE ORAL at 08:05

## 2023-05-02 RX ADMIN — Medication 50 MCG/KG/MIN: at 10:05

## 2023-05-02 RX ADMIN — LIDOCAINE HYDROCHLORIDE 100 MG: 20 INJECTION INTRAVENOUS at 10:05

## 2023-05-02 NOTE — PT/OT/SLP PROGRESS
"Occupational Therapy   Treatment    Name: Nelsy Marino  MRN: [id number]  Admitting Diagnosis:  LOLY (acute kidney injury)  Day of Surgery  Procedure(s):  EGD (ESOPHAGOGASTRODUODENOSCOPY)   Recommendations:     Discharge Recommendations: nursing facility, skilled  Discharge Equipment Recommendations:  none  Barriers to discharge:  None    Assessment:     Nelsy Marino is a 82 y.o. female with a medical diagnosis of LOLY (acute kidney injury).  She presents with the following performance deficits affecting function are weakness, impaired balance, impaired endurance, impaired self care skills, impaired functional mobility, decreased coordination, gait instability, decreased lower extremity function, impaired skin, pain, decreased safety awareness. Patient agreeable to OT session and tolerated well. Patient is progressing well with OT intervention and is motivated to increase occupational performance. Patient would benefit from continued OT services to address deficits and progress towards goals. Continue OT POC.    Rehab Prognosis:  Good; patient would benefit from acute skilled OT services to address these deficits and reach maximum level of function.       Plan:     Patient to be seen 3 x/week to address the above listed problems via self-care/home management, therapeutic activities, therapeutic exercises  Plan of Care Expires: 05/23/23  Plan of Care Reviewed with: patient, sibling    Subjective     Chief Complaint: none   Patient/Family Comments/goals: sister reported wanting patient to do more, "She used to walk on her own."  Pain/Comfort:  Pain Rating 1: 0/10  Pain Rating Post-Intervention 1: 0/10    Objective:     Communicated with: nurse baljinder and OTR prior to session.  Patient found right sidelying with telemetry, wound vac, peripheral IV, WILTON drain upon OT entry to room.  A client care conference was completed by the OTR and the VINCENT prior to treatment by the VINCENT to discuss the patient's POC and current " status.    General Precautions: Standard, fall    Orthopedic Precautions:   Braces:  (B darco shoes)  Respiratory Status: Room air     Occupational Performance:     Bed Mobility:    Patient completed Supine to Sit with contact guard assistance  Patient completed Sit to Supine with contact guard assistance     Functional Mobility/Transfers:  Patient completed Sit <> Stand Transfer with minimum assistance  with  rolling walker   Functional Mobility: 5 steps to R + 5 steps to L along EOB using RW with Mod(A) progressed to Min(A), B darco shoes donned    Activities of Daily Living:  Lower Body Dressing: minimum assistance for technique to fasten B darco shoes but patient demos good fig 4 technique       [facility] 6 Click ADL: 16    Treatment & Education:  Education on OT POC, goals, and current progress  Education provided on importance of OOB activity to improve activity tolerance, maximize recovery, and increase occupational performance. Patient receptive.  Education provided to nurse: Patient would benefit from bedside chair in room to promote daily OOB activity to supplement therapy requiring 1 person (A) using RW and stand pivot t/f. Nurse verbalized understanding. Patient would be appropriate for utilizing BSC.   Functional transfer/mobility training as indicated above  Addressed all patient questions/concerns within VINCENT scope of practice.     Patient left HOB elevated with all lines intact, call button in reach, nurse notified, and sister present    GOALS:   Multidisciplinary Problems       Occupational Therapy Goals          Problem: Occupational Therapy    Goal Priority Disciplines Outcome Interventions   Occupational Therapy Goal     OT, PT/OT Ongoing, Progressing    Description: Goals to be met by: 4/30/23    Patient will increase functional independence with ADLs by performing:    LE Dressing with Moderate Assistance.  Grooming while standing with Contact Guard Assistance.  Toileting from bedside commode with  Minimal Assistance for hygiene and clothing management.   Supine to sit with Moderate Assistance.  Step transfer with Moderate Assistance  Toilet transfer to bedside commode with Moderate Assistance.                         Time Tracking:     OT Date of Treatment: 05/02/23  OT Start Time: 1406  OT Stop Time: 1436  OT Total Time (min): 30 min    Billable Minutes:Self Care/Home Management 10  Therapeutic Activity 20    OT/GEORGE: GEORGE     Number of GEORGE visits since last OT visit: 1 5/2/2023

## 2023-05-02 NOTE — PHYSICIAN QUERY
PT Name: Nelsy Marino  MR #: [id number]     DOCUMENTATION CLARIFICATION     CDS/: Maude Lambert RN               Contact information: [email or web address]  This form is a permanent document in the medical record.    Query Date: May 2, 2023    By submitting this query, we are merely seeking further clarification of documentation.  Please utilize your independent clinical judgment when addressing the question(s) below.    The Medical Record contains the following:   Indicator Supporting Clinical Findings Location in Medical Record    Kidney (Renal) Insufficiency     x Kidney (Renal) Failure/Injury LOLY (acute kidney injury)  Patient with acute kidney injury likely due to IVVD/dehydration LOLY is currently worsening. Labs reviewed- Renal function/electrolytes with Estimated Creatinine Clearance: 19.6 mL/min (A)  (based on SCr of 2.1 mg/dL (H)). according to latest data. Monitor urine output and serial BMP and adjust therapy as needed. Avoid nephrotoxins and renally dose meds for GFR listed above.   Cr 2.1 (baseline 0.8-1.5).  Patient dry on physical exam.  Poor oral intake, most likely prerenal in origin.    During this admission she also had an LOLY, Nephrology was consulted thought to be ATN, appears to be improving today.  We are attempting a voiding trial as well    LOLY/ATN improving H&P 4/21                  HM PN 4/27      Nephro PN 4/27      Nephrotoxic Agents     x BUN/Creatinine           GFR  04/20/23 20:59 04/21/23 05:00 04/22/23 04:00 04/23/23 04:00 04/24/23 05:48 04/25/23 04:55 04/26/23 03:25   BUN 59 (H) 56 (H) 49 (H) 46 (H) 54 (H) 52 (H) 51 (H)   Creatinine 2.1 (H) 1.8 (H) 1.6 (H) 2.1 (H) 2.5 (H) 2.6 (H) 2.7 (H)   eGFR 23.1 ! 27.8 ! 32.0 ! 23.1 ! 18.7 ! 17.9 ! 17.1 !      04/27/23 03:32 04/28/23 08:19 04/29/23 03:25 04/30/23 04:36 05/01/23 05:43 05/02/23 05:21   BUN 43 (H) 36 (H) 34 (H) 33 (H) 33 (H) 29 (H)   Creatinine 2.4 (H) 2.3 (H) 2.3 (H) 2.1 (H) 2.1 (H) 2.0 (H)   eGFR 19.7 ! 20.7 ! 20.7 !  23.1 ! 23.1 ! 24.5 !    Labs 4/20-5/2                                 x Urine: Casts         Eosinophils  Most Recent 04/23/23 11:16 04/24/23 15:02   Hyaline Casts, UA 0  4/24/23 15:02 0 0    Labs 4/23-4/24               x Dehydration Patient with acute kidney injury likely due to IVVD/dehydration LOLY is currently worsening.   H&P 4/21   x Nausea/Vomiting Gastrointestinal:  Negative for abdominal pain, constipation, diarrhea, nausea and vomiting   H&P 4/21    Dialysis/CRRT     x Treatment - Holding home Lasix in setting of LOLY  - Holding Hyzaar (HCTZ) in setting of LOLY    LOLY noted at admission, improved initially with fluids but worsened again 4/23 likely from poor oral intake. Fluids reinitiated. Urinary retention noted, rivas placed.    H&P 4/21      HM PN 4/23        Other         [facility] approved diagnostic criteria for acute kidney injury is based on KDIGO criteria:    An increase in serum creatinine > 0.3mg/dl within 48 hours  OR  Increase in serum creatinine to > 1.5x baseline, which is known or presumed to have occurred within the prior 7 days  OR  Urine volume <0.5 ml/kg/hr for 6 hours       The clinical guidelines noted above are only a system guideline. It does not replace the providers clinical judgment.     Provider, please specify the diagnosis or diagnoses associated with above clinical findings.     [ x   ] Acute Kidney Failure/Injury with Tubular Necrosis  - Damage to the tubule cells of the kidney. Common triggers: shock, hypotension, IV contrast, rhabdomyolysis, medications     [    ] Unspecified Acute Kidney Failure/Injury       [    ] Other Acute Kidney Failure/Injury (please specify): ____________       [    ] Other (please specify): _______________________________             Please document in your progress notes daily for the duration of treatment until resolved and include in your discharge summary.    References:   KDIGO Clinical Practice Guideline for Acute Kidney Injury. (2012,  March). Retrieved October 21, 2020, from https://kdigo.org/wp-content/uploads/2016/10/LEKJB-8561-BYT-Guideline-English.pdf    FAUZIA Sharp MD, MATTHEW Godinez MD, & GAVIOTA Oakes MD. (1960). Renal medullary necrosis [Abstract]. The American Journal of Medicine, 29(1), 132-156. Doi:https://www.sciencedirect.com/science/article/abs/pii/5694281735375266    GAVIOTA Mcelroy MD, & ISIDRO Hassan MD, MS. (2020, June 18). Definition and staging of chronic kidney disease in adults ([id number] MATTHEW Craig MD, ScD & [id number] CLAUDE Sesay MD, MSc, Eds.). Retrieved October 21, 2020, from https://www.[employer]/contents/definition-and-staging-of-chronic-kidney-disease-in-adults?search=ckd%20staging&source=search_result&selectedTitle=1~150&usage_type=default&display_rank=1     TENZIN Vasquez MD, FACP. (2015, Dee Dee 15). Acute kidney injury revisited. Retrieved October 21, 2020, from https://acphospitalist.org/archives/2015/06/coding-acute-kidney-injury.htm    JONO Hines MD. (2019, July). Renal Cortical Necrosis. Retrieved October 21, 2020, from https://www.[employer]/professional/genitourinary-disorders/renovascular-disorders/renal-cortical-necrosis    Form No. 04676

## 2023-05-02 NOTE — TREATMENT PLAN
GI Post-Procedure Treatment Plan    Push enteroscopy complete    Impression:            - Normal esophagus.                          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                          Treated with argon plasma coagulation (APC).                          - Scar in the gastric body from prior APC                          treatment.                          - Non-bleeding gastric ulcers with a clean ulcer                          base (Dougie Class III). These are consistent                          with prior APC treatment.                          - A single bleeding angioectasia in the duodenum.                          Treated with argon plasma coagulation (APC).                          - No specimens collected.     Recommendation:        - Return patient to hospital rubi for ongoing care.                          - Resume previous diet.                          - If patient has recurrent bleeding, will need                          device assisted enteroscopy.     Can switch to PO PPI BID                          Damion Schrader  Gastroenterology Fellow, PGY-IV

## 2023-05-02 NOTE — PLAN OF CARE
05/02/23 1535   Post-Acute Status   Post-Acute Authorization Placement   Coverage [employer]   Discharge Plan   Discharge Plan A Skilled Nursing Facility     1.[facility] Dominguez Jr [facility] Phone: [phone]  -Interested, but need more information, Will have the DON review for placement   SW will continue to follow.      3:40 PM   COURTNEY sent updated clinicals to Rodo Dominguez Jr [facility] Phone: [phone] via [email or web address] for review. COURTNEY will continue to follow patient.          Diandra Roberts LMSW  PRN - [facility]  [phone]

## 2023-05-02 NOTE — ASSESSMENT & PLAN NOTE
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  GI consulted push enteroscopy 4/26 showed red blood in the gastric body and three bleeding angioectasias in the stomach treated with APC  5/1 Drop in Hgb after several melanotic stools. GI re consulted; completed EGD with push enteroscopy 5/2    - Continue PPI BID  - Maintain 2 large bore Ivs for volume resuscitation  - Trend CBC BID. Transfuse pRBC for Hb < 7 g/dL  - Stopped anticoagulation for now

## 2023-05-02 NOTE — ANESTHESIA POSTPROCEDURE EVALUATION
Anesthesia Post Evaluation    Patient: Nelsy Marino    Procedure(s) Performed: Procedure(s) (LRB):  EGD (ESOPHAGOGASTRODUODENOSCOPY) (N/A)    Final Anesthesia Type: general      Patient location during evaluation: PACU  Patient participation: Yes- Able to Participate  Level of consciousness: awake and alert  Post-procedure vital signs: reviewed and stable  Pain management: adequate  Airway patency: patent    PONV status at discharge: No PONV  Anesthetic complications: no      Cardiovascular status: blood pressure returned to baseline  Respiratory status: unassisted  Hydration status: euvolemic  Follow-up not needed.          Vitals Value Taken Time   /74 05/02/23 1104   Temp 36.6 °C (97.9 °F) 05/02/23 1043   Pulse 70 05/02/23 1122   Resp 19 05/02/23 1122   SpO2 100 % 05/02/23 1105   Vitals shown include unvalidated device data.      Event Time   Out of Recovery 11:14:00         Pain/Ana Maria Score: Pain Rating Prior to Med Admin: 7 (5/2/2023  6:11 AM)  Pain Rating Post Med Admin: 3 (5/1/2023  4:01 PM)  Ana Maria Score: 9 (5/2/2023 11:00 AM)

## 2023-05-02 NOTE — ASSESSMENT & PLAN NOTE
Patient with acute kidney injury likely due to IVVD/dehydration LOLY is currently worsening. Labs reviewed- Renal function/electrolytes with Estimated Creatinine Clearance: 19.5 mL/min (A) (based on SCr of 2 mg/dL (H)). according to latest data. Monitor urine output and serial BMP and adjust therapy as needed. Avoid nephrotoxins and renally dose meds for GFR listed above.     Cr 2.1 (baseline 0.8-1.5).  Patient dry on physical exam.  Poor oral intake, thought to be prerenal in origin initially. Possible due to acute on chronic anemia.    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

## 2023-05-02 NOTE — TELEPHONE ENCOUNTER
Pt call was returned. Male answered the phone, asked to speak to pt and was advised that pt is currently admitted in the hospital.

## 2023-05-02 NOTE — TELEPHONE ENCOUNTER
----- Message from Zeina Jimenez MA sent at 4/14/2023  2:47 PM CDT -----  Hello! This pt has an appt in your clinic. Dr. Hopper is asking that the pt be seen sooner dur to her issues. Any help in scheduling would be greatly appreciated. Thank you!

## 2023-05-02 NOTE — TRANSFER OF CARE
"Anesthesia Transfer of Care Note    Patient: Nelsy Marino    Procedure(s) Performed: Procedure(s) (LRB):  EGD (ESOPHAGOGASTRODUODENOSCOPY) (N/A)    Patient location: PACU    Anesthesia Type: general    Transport from OR: Transported from OR on 6-10 L/min O2 by face mask with adequate spontaneous ventilation    Post pain: adequate analgesia    Post assessment: no apparent anesthetic complications    Post vital signs: stable    Post-procedure mental status: drowsy, arousable.    Nausea/Vomiting: no nausea/vomiting    Complications: none    Transfer of care protocol was followed      Last vitals:   Visit Vitals  BP (!) 145/65   Pulse 70   Temp 36.6 °C (97.9 °F) (Temporal)   Resp 20   Ht 5' 5" (1.651 m)   Wt 68 kg (149 lb 14.6 oz)   LMP  (LMP Unknown)   SpO2 100%   Breastfeeding No   BMI 24.95 kg/m²     "

## 2023-05-02 NOTE — H&P
Short Stay Endoscopy History and Physical    PCP - [facility]     Procedure - EGD  ASA - per anesthesia  Mallampati - per anesthesia  History of Anesthesia problems - no  Family history Anesthesia problems -  no   Plan of anesthesia - General    HPI:  This is a 82 y.o. female here for evaluation of :     Melena      ROS:  Constitutional: No fevers, chills, No weight loss  CV: No chest pain  Pulm: No cough, No shortness of breath  Ophtho: No vision changes  GI: see HPI  Derm: No rash    Medical History:  has a past medical history of CHF (congestive heart failure), Diabetes mellitus, DVT (deep venous thrombosis), Hypertension, and Miscarriage.    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    Family History: family history includes Diabetes in her father; Hypertension in her father.. Otherwise no colon cancer, inflammatory bowel disease, or GI malignancies.    Social History:  reports that she quit smoking about 43 years ago. Her smoking use included cigarettes. She has never used smokeless tobacco. She reports that she does not drink alcohol and does not use drugs.    Review of patient's allergies indicates:   Allergen Reactions    Motrin [ibuprofen] Itching       Medications:   Medications Prior to Admission   Medication Sig Dispense Refill Last  "Dose    atorvastatin (LIPITOR) 40 MG tablet Take 1 tablet (40 mg total) by mouth every evening. 30 tablet 0     carvediloL (COREG) 12.5 MG tablet Take 12.5 mg by mouth 2 (two) times daily.       diphenhydrAMINE (BENADRYL) 25 mg capsule Take 25 mg by mouth daily as needed for Itching.       DULoxetine (CYMBALTA) 30 MG capsule Take 1 capsule (30 mg total) by mouth 2 (two) times daily. 60 capsule 0     furosemide (LASIX) 20 MG tablet Take 1 tablet (20 mg total) by mouth once daily. 30 tablet 0     insulin aspart U-100 (NOVOLOG) 100 unit/mL injection Inject 4 Units into the skin 3 (three) times daily before meals.   4/20/23    insulin detemir U-100, Levemir, (LEVEMIR FLEXTOUCH U-100 INSULN) 100 unit/mL (3 mL) InPn pen Inject 9 Units into the skin every evening.   4/19/23    [DISCONTINUED] acetaminophen (TYLENOL) 650 MG TbSR Take 650-1,300 mg by mouth every 8 (eight) hours as needed (Pain).       [DISCONTINUED] clopidogreL (PLAVIX) 75 mg tablet Take 1 tablet (75 mg total) by mouth once daily. 30 tablet 0     [DISCONTINUED] HYDROcodone-acetaminophen (NORCO) 5-325 mg per tablet Take 1 tablet by mouth every 6 (six) hours as needed for Pain.       [DISCONTINUED] losartan-hydrochlorothiazide 100-25 mg (HYZAAR) 100-25 mg per tablet Take 1 tablet by mouth once daily.       [DISCONTINUED] pantoprazole (PROTONIX) 40 MG tablet Take 1 tablet (40 mg total) by mouth 2 (two) times daily. 60 tablet 1     BLOOD SUGAR DIAGNOSTIC (TRUE METRIX GLUCOSE TEST STRIP MISC) by Misc.(Non-Drug; Combo Route) route.       insulin aspart protamine-insulin aspart (NOVOLOG MIX 70-30FLEXPEN U-100) 100 unit/mL (70-30) InPn pen Inject 12 Units into the skin 2 (two) times daily before meals. (Patient not taking: Reported on 4/26/2023)  0 Not Taking    insulin syringe-needle,dispos. 0.3 mL 30 gauge x 5/16" Syrg by Misc.(Non-Drug; Combo Route) route.       oxyCODONE (ROXICODONE) 5 MG immediate release tablet Take 1 tablet (5 mg total) by mouth every 6 (six) " "hours as needed for Pain. (Patient not taking: Reported on 4/26/2023) 12 tablet 0 Not Taking    pen needle, diabetic 32 gauge x 5/32" Ndle Inject 1 each into the skin 4 (four) times daily. 100 each 0     [DISCONTINUED] aspirin (ECOTRIN) 81 MG EC tablet Take 81 mg by mouth once daily.       [DISCONTINUED] benzonatate (TESSALON) 100 MG capsule Take 1 capsule (100 mg total) by mouth 3 (three) times daily as needed for Cough. 20 capsule 0        Physical Exam:    Vital Signs:   Vitals:    05/02/23 0611   BP:    Pulse:    Resp: 18   Temp:        General Appearance: Well appearing in no acute distress  Eyes:    No scleral icterus  ENT: Neck supple, Lips, mucosa, and tongue normal; teeth and gums normal  Abdomen: Soft, non tender, non distended with normal bowel sounds. No hepatosplenomegaly, ascites, or mass.  Extremities: No edema  Skin: No rash    Labs:  Lab Results   Component Value Date    WBC 10.59 05/01/2023    HGB 7.1 (L) 05/01/2023    HCT 23.6 (L) 05/01/2023     05/01/2023    CHOL 109 (L) 02/28/2023    TRIG 58 02/28/2023    HDL 38 (L) 02/28/2023    ALT <5 (L) 05/02/2023    AST 15 05/02/2023     05/02/2023    K 3.8 05/02/2023     05/02/2023    CREATININE 2.0 (H) 05/02/2023    BUN 29 (H) 05/02/2023    CO2 26 05/02/2023    TSH 1.304 04/22/2023    INR 1.2 04/04/2023    HGBA1C 8.2 (H) 02/28/2023       I have explained the risks and benefits of endoscopy procedures to the patient including but not limited to bleeding, perforation, infection, and death.  The patient was asked if they understand and allowed to ask any further questions to their satisfaction.      Mckay Moe MD   "

## 2023-05-02 NOTE — SUBJECTIVE & OBJECTIVE
Interval History: Patient had melena 1x overnight. Appropriate response to 1u prbc transfusion. Egd with push enteroscopy today    Review of Systems   Constitutional:  Negative for activity change, appetite change and fever.   HENT:  Negative for congestion and sore throat.    Respiratory:  Negative for cough, shortness of breath and wheezing.    Cardiovascular:  Negative for chest pain and leg swelling.   Gastrointestinal:  Negative for abdominal pain, constipation, diarrhea, nausea and vomiting.        Dark stools   Genitourinary:  Negative for difficulty urinating and dysuria.   Musculoskeletal:  Positive for arthralgias and myalgias.   Skin:  Positive for color change and wound. Negative for rash.   Allergic/Immunologic: Negative.    Neurological:  Negative for dizziness and headaches.   Hematological: Negative.    Objective:     Vital Signs (Most Recent):  Temp: 97.9 °F (36.6 °C) (05/02/23 1043)  Pulse: 68 (05/02/23 1100)  Resp: 19 (05/02/23 1100)  BP: (!) 161/71 (05/02/23 1100)  SpO2: 100 % (05/02/23 1100)   Vital Signs (24h Range):  Temp:  [97.1 °F (36.2 °C)-98.9 °F (37.2 °C)] 97.9 °F (36.6 °C)  Pulse:  [67-90] 68  Resp:  [14-20] 19  SpO2:  [92 %-100 %] 100 %  BP: (128-191)/() 161/71     Weight: 68 kg (149 lb 14.6 oz)  Body mass index is 24.95 kg/m².    Intake/Output Summary (Last 24 hours) at 5/2/2023 1133  Last data filed at 5/2/2023 1021  Gross per 24 hour   Intake 630.42 ml   Output --   Net 630.42 ml        Physical Exam  Vitals and nursing note reviewed.   Constitutional:       General: She is not in acute distress.     Appearance: She is not ill-appearing.   HENT:      Head: Normocephalic and atraumatic.      Mouth/Throat:      Mouth: Mucous membranes are moist.      Pharynx: Oropharynx is clear.   Eyes:      Extraocular Movements: Extraocular movements intact.      Conjunctiva/sclera: Conjunctivae normal.   Cardiovascular:      Rate and Rhythm: Normal rate and regular rhythm.      Pulses: Normal  pulses.   Pulmonary:      Effort: Pulmonary effort is normal. No respiratory distress.      Breath sounds: No wheezing or rales.   Abdominal:      Palpations: Abdomen is soft.      Tenderness: There is no abdominal tenderness. There is no guarding.   Musculoskeletal:      Cervical back: Normal range of motion and neck supple.      Right lower leg: No edema.      Left lower leg: No edema.   Skin:     General: Skin is warm.      Findings: Lesion (R heel, L dorsum with darkened skin) present.      Comments: Insicion on right inner thigh- c/d/I- no drainage or erythema seen.  Wound on left thigh- with dressing on top and drain in place with serosanguinous fluid.    Neurological:      General: No focal deficit present.      Mental Status: She is oriented to person, place, and time. Mental status is at baseline.       Significant Labs: All pertinent labs within the past 24 hours have been reviewed.  CBC:   Recent Labs   Lab 05/01/23  0543 05/01/23  1636 05/02/23  0521   WBC 9.17 10.59 8.36   HGB 6.1* 7.1* 8.4*   HCT 20.0* 23.6* 27.5*    272 245       CMP:   Recent Labs   Lab 05/01/23  0543 05/02/23  0521    142   K 3.8 3.8    108   CO2 25 26   * 126*   BUN 33* 29*   CREATININE 2.1* 2.0*   CALCIUM 8.9 9.0   PROT 6.9 7.3   ALBUMIN 2.0* 2.2*   BILITOT 0.3 1.0   ALKPHOS 50* 50*   AST 14 15   ALT <5* <5*   ANIONGAP 8 8         Significant Imaging: I have reviewed all pertinent imaging results/findings within the past 24 hours.

## 2023-05-02 NOTE — ASSESSMENT & PLAN NOTE
Recently discharged home with IV Cefepime 2g Q12 hours + IV Micafungin 100mg Q24 hours for estimated end of therapy date of 5/16/2023. For R toe amputation. Patient's  reports adherence with IV Abx. L foot with darkening skin changes, which alarmed  as this was how skin presented prior to toe amputations of R foot. RLE warm to touch with L US Lower Extremity Arteries demonstrating diminished flow throughout the left lower extremity. Monophasic waveforms suggesting atherosclerotic disease.     - Podiatry and Vascular Surgery following; appreciate recs  - Restarted ASA 4/29, but discontinued after melena. Will not resume DAPT on d/c  - Continue Micafungin and Zosyn per prior ID recs until 5/17 to then continue Keflex ppx per ID thereafter for graft infection

## 2023-05-02 NOTE — PROGRESS NOTES
[facility] Medicine  Progress Note    Patient Name: Nelsy Marino  MRN: [id number]  Patient Class: IP- Inpatient   Admission Date: 4/20/2023  Length of Stay: 10 days  Attending Physician: Shadi Vidal MD  Primary Care Provider: Juan F Garay        Subjective:     Principal Problem:LOLY (acute kidney injury)        HPI:  This is an 82-year-old female with a past medical history of severe PAD, hypertension, type 2 diabetes, HFpEF (EF 65%), L femoral bypass and L leg flap, R toe ampuations 2 months prior with presenting after  noted waxing/waning confusion since her discharge from hospital two weeks ago. History largely provided by  at bedside as patient is intermittently somnolent. She was discharged on IV Cefepime and Micafungin for her R foot amputation and patient reports adherence with regimen at home. He grew concerned that int he past two weeks she had worsening LLE pain and darkened skin, which is what happened before her R toes were amputated. His niece contacted a nurse who recommneded ED evaluation. No fevers, chills, HA, chest pain, SOB, N/V/D, abdominal pain.    Pt receives  nurse aide, PT/OT since hospital DC and ID very recently made a referral to podiatry and wound care, but appointments not yet made.     In the ED patient was hemodynamically stable and hypertensive, intermittently on RA to 2L NC. Leukocytosis to 13, Hgb 7.5, Cr 2.1, Albumin 2.1, BCx in process, CT head and CXR wnl. Started on BS Abx with continued Cefepime, [place], started on Vanc pending Bcx, US LE arterial with diminished flow throughout the left lower extremity. Monophasic waveforms suggesting atherosclerotic disease. Admitted to  for LOLY and Podiatry/Wound care.       Overview/Hospital Course:  Admitted to hospital medicine for LOLY and left foot pain. Cr on admission 2.1 (bl.8-1.5), improved with IVF initially. After holding IVF Cr increased, and then unresponsive to fluids. Patient with  significant urine retention, rivas placed and Cr again increased. Consulted Nephrology for assistance, suspect due to acute drop in Hgb. LOLY improved 4/27. Nephrology recommended heme/onc evaluation for this patient regarding the three monoclonal bands seen on SPE. Spoke with heme/onc regarding case, labs ordered in anticipation of outpatient evaluation given that patient stable. Patient having increased soft bowel movements with melena 4/25. GI consulted performed EGD with small bowel follow through 4/26 revealed red blood in the gastric body with three bleeding angioectasias in the stomach treated with APC. DAPT held in setting of acute bleed. Discussed concern of rebleeding AVM possibly due to DAPT with vascular. ASA trial for 1 day after patient with 2-3 days without melena resulted in 5 dark tarry BM. ASA d/c and GI reconsulted after Hgb from 8 to 6.1 overnight 5/1. EGD with push enteroscopy 5/2 revealed single bleeding angioectasia in the duodenum, treated with argon plasma coagulation (APC). Continued PO PPI BID.     Vascular surgery consulted on admission, recommended repeat ARCHIE and left foot arterial doppler. Podiatry also saw the patient for left foot wound, recommended wound vac placement. Both Vascular and Podiatry felt no further intervention necessary at this time.     ID consulted, patient was receiving cefepime and micafungin outpatient per ID following R foot transmetatarsal amputation. Started on vanc, cefepime, and micafungin. Patient more lethargic 4/22 although able to follow commands and move extremities on exam. With concern for cefepime toxicity, ID removed cefepime and vancomycin. Micafungin and zosyn continued. EEG and CTH ordered, no acute abnormalities. Patient significantly improved following d/c Cefepime to baseline. Patient to continue zosyn and micafungin until ID follow up 5/17 and then continue keflex ppx thereafter per ID.    SLP consulted recommended pureed dysphagia diet. PT/OT  recommended SNF placement. Social work assisting with placement.      No new subjective & objective note has been filed under this hospital service since the last note was generated.      Assessment/Plan:      * LOLY (acute kidney injury)  Patient with acute kidney injury likely due to IVVD/dehydration LOLY is currently worsening. Labs reviewed- Renal function/electrolytes with Estimated Creatinine Clearance: 19.5 mL/min (A) (based on SCr of 2 mg/dL (H)). according to latest data. Monitor urine output and serial BMP and adjust therapy as needed. Avoid nephrotoxins and renally dose meds for GFR listed above.     Cr 2.1 (baseline 0.8-1.5).  Patient dry on physical exam.  Poor oral intake, thought to be prerenal in origin initially. Possible due to acute on chronic anemia.    Recent Labs   Lab 05/01/23  0543 05/02/23  0521    142   K 3.8 3.8    108   CO2 25 26   BUN 33* 29*   CREATININE 2.1* 2.0*     - Nephrology consulted; appreciate assistance   - Will refer to heme/onc on discharge for further workup of three monoclonal bands seen on the SPE  - [place] placed after patient retaining on bladder scan 4/23 with hematuria 4/24  - Voiding trial 4/27   - RP u/s no hydronephrosis  - Urine lytes showed possible intrinsic etiology  - CMP qd, trend Cr  - strict I/O  - daily weights  - renally dose all medications  - avoid nephrotoxic agents, NSAIDs, IV contrast, ACE/ARB      Debility  - PT/OT reconsulted  - Fall precautions  - Established with  PT/OT/nurse aide prior to hospitalization  - SW assisting in SNF placement       Skin ulcer of right heel, limited to breakdown of skin  See PVD for plan    - Wound care and podiatry consulted with appreciated mgmt recs      Acute on chronic anemia  Baseline 8.6-11.   Initially suspected foot wounds, patient also has leg muscle flap with drain vs hematuria vs melena  EGD small bowel follow through 4/26 with red blood in the gastric body and three bleeding angioectasias in the  stomach treated with APC      - CBC qd, trend   - transfuse pRBC for Hb < 7 g/dL; transfuse for < 8 if ACS or active bleeding and unresponsive to volume resuscitation with evidence of end-organ ischemia/>20% blood volume loss with HD instablity/>30% blood volume loss      Melena  EGD 4/11 showed AVMs treated with APC.  GI consulted push enteroscopy 4/26 showed red blood in the gastric body and three bleeding angioectasias in the stomach treated with APC  5/1 Drop in Hgb after several melanotic stools. GI re consulted; completed EGD with push enteroscopy 5/2    - Continue PPI BID  - Maintain 2 large bore Ivs for volume resuscitation  - Trend CBC BID. Transfuse pRBC for Hb < 7 g/dL  - Stopped anticoagulation for now        Acute encephalopathy  A&O x3 on admission with waxing/waning per  at the bedside.  says she has been largely bed bound since discharge, but her waxing confusion was concerning to him. Patients mentation significantly improved after stopping cefepime.  Resolved; likely 2/2 cefepime neurotoxicity   - Repeat CT Head with no acute change  - EEG without seizures  - TSH, B12, B1, Vit E, Ammonia, RPR were unremarkable  - Cefepime changed to Zosyn  - F/u blood and Urine cultures      S/P transmetatarsal amputation of foot, right  Appreciated evaluation by Podiatry      Diastolic CHF, chronic  Patient is identified as having Diastolic (HFpEF) heart failure that is Chronic. CHF is currently controlled. Latest ECHO performed and demonstrates- Results for orders placed during the hospital encounter of 02/27/23    Echo    Interpretation Summary  · The estimated ejection fraction is 65%.  · The left ventricle is normal in size with normal systolic function.  · Grade II left ventricular diastolic dysfunction.  · Normal right ventricular size with normal right ventricular systolic function.  · Mild left atrial enlargement.  · Mild-to-moderate mitral regurgitation.  · There is pulmonary  hypertension.  · The estimated PA systolic pressure is 55 mmHg.  · Intermediate central venous pressure (8 mmHg).  . Continue Beta Blocker and Furosemide and monitor clinical status closely. Monitor on telemetry. Patient is off CHF pathway.  Monitor strict Is&Os and daily weights.  Place on fluid restriction of 1.5 L. Continue to stress to patient importance of self efficacy and  on diet for CHF. Last BNP reviewed- and noted below No results for input(s): BNP, BNPTRIAGEBLO in the last 168 hours..    - Holding home Lasix in setting of LOLY  - Holding Hyzaar (HCTZ) in setting of LOLY  - Patient breathing comfortably on RA, no signs of volume overload      Type 2 diabetes mellitus with chronic kidney disease, with long-term current use of insulin  Patient's FSGs are uncontrolled due to hyperglycemia on current medication regimen.  Last A1c reviewed-   Lab Results   Component Value Date    HGBA1C 8.2 (H) 02/28/2023     Most recent fingerstick glucose reviewed-   Recent Labs   Lab 04/27/23  1532 04/27/23  2031 04/28/23  0724   POCTGLUCOSE 189* 188* 157*     Current correctional scale  Low  Maintain anti-hyperglycemic dose as follows-   Antihyperglycemics (From admission, onward)      Start     Stop Route Frequency Ordered    04/21/23 0355  insulin aspart U-100 pen 0-5 Units         -- SubQ Before meals & nightly PRN 04/21/23 0255          Hold Oral hypoglycemics while patient is in the hospital.    Essential hypertension  - Continue home Coreg, amlodipine  - hold Hyzaar in setting of LOLY      PAD (peripheral artery disease)  Recently discharged home with IV Cefepime 2g Q12 hours + IV Micafungin 100mg Q24 hours for estimated end of therapy date of 5/16/2023. For R toe amputation. Patient's  reports adherence with IV Abx. L foot with darkening skin changes, which alarmed  as this was how skin presented prior to toe amputations of R foot. RLE warm to touch with L US Lower Extremity Arteries demonstrating  diminished flow throughout the left lower extremity. Monophasic waveforms suggesting atherosclerotic disease.     - Podiatry and Vascular Surgery following; appreciate recs  - Restarted ASA 4/29, but discontinued after melena. Will not resume DAPT on d/c  - Continue Micafungin and Zosyn per prior ID recs until 5/17 to then continue Keflex ppx per ID thereafter for graft infection      VTE Risk Mitigation (From admission, onward)           Ordered     IP VTE HIGH RISK PATIENT  Once         04/21/23 0254     Place sequential compression device  Until discontinued         04/21/23 0254                    Discharge Planning   JUAN ALBERTO: 5/3/2023     Code Status: Full Code   Is the patient medically ready for discharge?: No    Reason for patient still in hospital (select all that apply): Patient trending condition, Consult recommendations and Pending disposition  Discharge Plan A: Skilled Nursing Facility                  Boris Mckeon DO  Department of Hospital Medicine   [facility]

## 2023-05-02 NOTE — PLAN OF CARE
Vital signs stable. Afebrile. Drowsy and oriented. Denies nausea. Wound vac remains in place. POC reviewed and understanding verbalized.

## 2023-05-02 NOTE — PLAN OF CARE
23 1318   Post-Acute Status   Post-Acute Authorization Placement   Post-Acute Placement Status Referrals Sent   Coverage [employer]   Discharge Plan   Discharge Plan A Skilled Nursing Facility     SW faxed referral to SNF facilities via [employer] for review to the followin.[facility] Phone: [phone]    2.[facility] Phone: [phone]    3.[facility] Phone: [phone]    4.[facility] Phone: [phone]    5.[facility] ([facility]) Phone: [phone]    6.[facility] Phone: [phone]    7.[place] Phone: [phone]    8.Critical access hospital / [employer] Phone: [phone]    SW will continue to follow .    Kizzy Norman([facility]) contacted COURTNEY and reported that [employer] may not due Auth for patient for SNF placement due to pt was already wheelchair bound prior to this hospital admission and Home Health services may be consider. SW will f/u with family concerning this matter.    Diandra Roberts LMSW  PRN - [facility]  [phone]         COURTNEY will continue to follow patient.

## 2023-05-02 NOTE — PROVATION PATIENT INSTRUCTIONS
Discharge Summary/Instructions after an Endoscopic Procedure  Patient Name: Nelsy Marino  Patient MRN: [id number]  Patient YOB: 1940  Tuesday, May 2, 2023  Cuong Conteh MD  Dear patient,  As a result of recent federal legislation (The Federal Cures Act), you may   receive lab or pathology results from your procedure in your [facility]   account before your physician is able to contact you. Your physician or   their representative will relay the results to you with their   recommendations at their soonest availability.  Thank you,  RESTRICTIONS:  During your procedure today, you received medications for sedation.  These   medications may affect your judgment, balance and coordination.  Therefore,   for 24 hours, you have the following restrictions:   - DO NOT drive a car, operate machinery, make legal/financial decisions,   sign important papers or drink alcohol.    ACTIVITY:  Today: no heavy lifting, straining or running due to procedural   sedation/anesthesia.  The following day: return to full activity including work.  DIET:  Eat and drink normally unless instructed otherwise.     TREATMENT FOR COMMON SIDE EFFECTS:  - Mild abdominal pain, nausea, belching, bloating or excessive gas:  rest,   eat lightly and use a heating pad.  - Sore Throat: treat with throat lozenges and/or gargle with warm salt   water.  - Because air was used during the procedure, expelling large amounts of air   from your rectum or belching is normal.  - If a bowel prep was taken, you may not have a bowel movement for 1-3 days.    This is normal.  SYMPTOMS TO WATCH FOR AND REPORT TO YOUR PHYSICIAN:  1. Abdominal pain or bloating, other than gas cramps.  2. Chest pain.  3. Back pain.  4. Signs of infection such as: chills or fever occurring within 24 hours   after the procedure.  5. Rectal bleeding, which would show as bright red, maroon, or black stools.   (A tablespoon of blood from the rectum is not serious, especially if    hemorrhoids are present.)  6. Vomiting.  7. Weakness or dizziness.  GO DIRECTLY TO THE NEAREST EMERGENCY ROOM IF YOU HAVE ANY OF THE FOLLOWING:      Difficulty breathing              Chills and/or fever over 101 F   Persistent vomiting and/or vomiting blood   Severe abdominal pain   Severe chest pain   Black, tarry stools   Bleeding- more than one tablespoon   Any other symptom or condition that you feel may need urgent attention  Your doctor recommends these additional instructions:  If any biopsies were taken, your doctors clinic will contact you in 1 to 2   weeks with any results.  - Return patient to hospital rubi for ongoing care.   - Resume previous diet.   - If patient has recurrent bleeding, will need device assisted enteroscopy.     - Patient has a contact number available for emergencies.  The signs and   symptoms of potential delayed complications were discussed with the   patient.  Return to normal activities tomorrow.  Written discharge   instructions were provided to the patient.  For questions, problems or results please call your physician - Cuong Conteh MD at Work:  [phone].  [facility], EMERGENCY ROOM PHONE NUMBER: [phone]  IF A COMPLICATION OR EMERGENCY SITUATION ARISES AND YOU ARE UNABLE TO REACH   YOUR PHYSICIAN - GO DIRECTLY TO THE EMERGENCY ROOM.  Cuong Conteh MD  5/2/2023 10:56:49 AM  This report has been verified and signed electronically.  Dear patient,  As a result of recent federal legislation (The Federal Cures Act), you may   receive lab or pathology results from your procedure in your [facility]   account before your physician is able to contact you. Your physician or   their representative will relay the results to you with their   recommendations at their soonest availability.  Thank you,  [employer]OVATION

## 2023-05-03 LAB
ALBUMIN SERPL BCP-MCNC: 2.1 G/DL (ref 3.5–5.2)
ALP SERPL-CCNC: 52 U/L (ref 55–135)
ALT SERPL W/O P-5'-P-CCNC: 6 U/L (ref 10–44)
ANION GAP SERPL CALC-SCNC: 11 MMOL/L (ref 8–16)
AST SERPL-CCNC: 15 U/L (ref 10–40)
BASOPHILS # BLD AUTO: 0.02 K/UL (ref 0–0.2)
BASOPHILS # BLD AUTO: 0.02 K/UL (ref 0–0.2)
BASOPHILS NFR BLD: 0.2 % (ref 0–1.9)
BASOPHILS NFR BLD: 0.2 % (ref 0–1.9)
BILIRUB SERPL-MCNC: 0.4 MG/DL (ref 0.1–1)
BUN SERPL-MCNC: 25 MG/DL (ref 8–23)
CALCIUM SERPL-MCNC: 8.9 MG/DL (ref 8.7–10.5)
CHLORIDE SERPL-SCNC: 110 MMOL/L (ref 95–110)
CO2 SERPL-SCNC: 22 MMOL/L (ref 23–29)
CREAT SERPL-MCNC: 2 MG/DL (ref 0.5–1.4)
DIFFERENTIAL METHOD: ABNORMAL
DIFFERENTIAL METHOD: ABNORMAL
EOSINOPHIL # BLD AUTO: 0.5 K/UL (ref 0–0.5)
EOSINOPHIL # BLD AUTO: 0.6 K/UL (ref 0–0.5)
EOSINOPHIL NFR BLD: 6.2 % (ref 0–8)
EOSINOPHIL NFR BLD: 6.9 % (ref 0–8)
ERYTHROCYTE [DISTWIDTH] IN BLOOD BY AUTOMATED COUNT: 14.5 % (ref 11.5–14.5)
ERYTHROCYTE [DISTWIDTH] IN BLOOD BY AUTOMATED COUNT: 14.7 % (ref 11.5–14.5)
EST. GFR  (NO RACE VARIABLE): 24.5 ML/MIN/1.73 M^2
GLUCOSE SERPL-MCNC: 103 MG/DL (ref 70–110)
GRAM STN SPEC: NORMAL
GRAM STN SPEC: NORMAL
HCT VFR BLD AUTO: 26.7 % (ref 37–48.5)
HCT VFR BLD AUTO: 28.4 % (ref 37–48.5)
HGB BLD-MCNC: 8.4 G/DL (ref 12–16)
HGB BLD-MCNC: 8.8 G/DL (ref 12–16)
IMM GRANULOCYTES # BLD AUTO: 0.02 K/UL (ref 0–0.04)
IMM GRANULOCYTES # BLD AUTO: 0.02 K/UL (ref 0–0.04)
IMM GRANULOCYTES NFR BLD AUTO: 0.2 % (ref 0–0.5)
IMM GRANULOCYTES NFR BLD AUTO: 0.2 % (ref 0–0.5)
LYMPHOCYTES # BLD AUTO: 1.5 K/UL (ref 1–4.8)
LYMPHOCYTES # BLD AUTO: 1.6 K/UL (ref 1–4.8)
LYMPHOCYTES NFR BLD: 17.6 % (ref 18–48)
LYMPHOCYTES NFR BLD: 19.6 % (ref 18–48)
MAGNESIUM SERPL-MCNC: 1.6 MG/DL (ref 1.6–2.6)
MCH RBC QN AUTO: 26.7 PG (ref 27–31)
MCH RBC QN AUTO: 26.8 PG (ref 27–31)
MCHC RBC AUTO-ENTMCNC: 31 G/DL (ref 32–36)
MCHC RBC AUTO-ENTMCNC: 31.5 G/DL (ref 32–36)
MCV RBC AUTO: 85 FL (ref 82–98)
MCV RBC AUTO: 87 FL (ref 82–98)
MONOCYTES # BLD AUTO: 0.4 K/UL (ref 0.3–1)
MONOCYTES # BLD AUTO: 0.4 K/UL (ref 0.3–1)
MONOCYTES NFR BLD: 4.4 % (ref 4–15)
MONOCYTES NFR BLD: 4.4 % (ref 4–15)
NEUTROPHILS # BLD AUTO: 5.7 K/UL (ref 1.8–7.7)
NEUTROPHILS # BLD AUTO: 6.2 K/UL (ref 1.8–7.7)
NEUTROPHILS NFR BLD: 68.7 % (ref 38–73)
NEUTROPHILS NFR BLD: 71.4 % (ref 38–73)
NRBC BLD-RTO: 0 /100 WBC
NRBC BLD-RTO: 0 /100 WBC
PLATELET # BLD AUTO: 258 K/UL (ref 150–450)
PLATELET # BLD AUTO: 265 K/UL (ref 150–450)
PMV BLD AUTO: 10.4 FL (ref 9.2–12.9)
PMV BLD AUTO: 11.1 FL (ref 9.2–12.9)
POCT GLUCOSE: 136 MG/DL (ref 70–110)
POCT GLUCOSE: 177 MG/DL (ref 70–110)
POCT GLUCOSE: 219 MG/DL (ref 70–110)
POCT GLUCOSE: 238 MG/DL (ref 70–110)
POTASSIUM SERPL-SCNC: 3.8 MMOL/L (ref 3.5–5.1)
PROT SERPL-MCNC: 7.1 G/DL (ref 6–8.4)
RBC # BLD AUTO: 3.15 M/UL (ref 4–5.4)
RBC # BLD AUTO: 3.28 M/UL (ref 4–5.4)
SODIUM SERPL-SCNC: 143 MMOL/L (ref 136–145)
WBC # BLD AUTO: 8.37 K/UL (ref 3.9–12.7)
WBC # BLD AUTO: 8.67 K/UL (ref 3.9–12.7)

## 2023-05-03 PROCEDURE — 99232 SBSQ HOSP IP/OBS MODERATE 35: CPT | Mod: GC,,, | Performed by: STUDENT IN AN ORGANIZED HEALTH CARE EDUCATION/TRAINING PROGRAM

## 2023-05-03 PROCEDURE — 99232 PR SUBSEQUENT HOSPITAL CARE,LEVL II: ICD-10-PCS | Mod: ,,,

## 2023-05-03 PROCEDURE — 63600175 PHARM REV CODE 636 W HCPCS

## 2023-05-03 PROCEDURE — 25000003 PHARM REV CODE 250: Performed by: INTERNAL MEDICINE

## 2023-05-03 PROCEDURE — 99232 SBSQ HOSP IP/OBS MODERATE 35: CPT | Mod: ,,,

## 2023-05-03 PROCEDURE — 87205 SMEAR GRAM STAIN: CPT

## 2023-05-03 PROCEDURE — 11000001 HC ACUTE MED/SURG PRIVATE ROOM

## 2023-05-03 PROCEDURE — 25000003 PHARM REV CODE 250

## 2023-05-03 PROCEDURE — 36415 COLL VENOUS BLD VENIPUNCTURE: CPT | Performed by: STUDENT IN AN ORGANIZED HEALTH CARE EDUCATION/TRAINING PROGRAM

## 2023-05-03 PROCEDURE — 99232 PR SUBSEQUENT HOSPITAL CARE,LEVL II: ICD-10-PCS | Mod: GC,,, | Performed by: STUDENT IN AN ORGANIZED HEALTH CARE EDUCATION/TRAINING PROGRAM

## 2023-05-03 PROCEDURE — 85025 COMPLETE CBC W/AUTO DIFF WBC: CPT | Mod: 91 | Performed by: STUDENT IN AN ORGANIZED HEALTH CARE EDUCATION/TRAINING PROGRAM

## 2023-05-03 PROCEDURE — 83735 ASSAY OF MAGNESIUM: CPT | Performed by: STUDENT IN AN ORGANIZED HEALTH CARE EDUCATION/TRAINING PROGRAM

## 2023-05-03 PROCEDURE — 94761 N-INVAS EAR/PLS OXIMETRY MLT: CPT

## 2023-05-03 PROCEDURE — 25000003 PHARM REV CODE 250: Performed by: STUDENT IN AN ORGANIZED HEALTH CARE EDUCATION/TRAINING PROGRAM

## 2023-05-03 PROCEDURE — 87070 CULTURE OTHR SPECIMN AEROBIC: CPT

## 2023-05-03 PROCEDURE — 80053 COMPREHEN METABOLIC PANEL: CPT | Performed by: STUDENT IN AN ORGANIZED HEALTH CARE EDUCATION/TRAINING PROGRAM

## 2023-05-03 PROCEDURE — 87075 CULTR BACTERIA EXCEPT BLOOD: CPT

## 2023-05-03 RX ORDER — PANTOPRAZOLE SODIUM 40 MG/1
40 TABLET, DELAYED RELEASE ORAL
Qty: 180 TABLET | Refills: 0 | Status: SHIPPED | OUTPATIENT
Start: 2023-05-03 | End: 2023-05-05 | Stop reason: HOSPADM

## 2023-05-03 RX ORDER — CARVEDILOL 25 MG/1
25 TABLET ORAL 2 TIMES DAILY
Qty: 60 TABLET | Refills: 11 | Status: SHIPPED | OUTPATIENT
Start: 2023-05-03 | End: 2023-06-02 | Stop reason: SDUPTHER

## 2023-05-03 RX ORDER — PANTOPRAZOLE SODIUM 40 MG/1
40 TABLET, DELAYED RELEASE ORAL DAILY
Qty: 90 TABLET | Refills: 0 | Status: ON HOLD | OUTPATIENT
Start: 2023-05-03 | End: 2023-08-03 | Stop reason: HOSPADM

## 2023-05-03 RX ORDER — CARVEDILOL 25 MG/1
25 TABLET ORAL 2 TIMES DAILY
Status: DISCONTINUED | OUTPATIENT
Start: 2023-05-03 | End: 2023-05-05 | Stop reason: HOSPADM

## 2023-05-03 RX ORDER — AMLODIPINE BESYLATE 10 MG/1
10 TABLET ORAL DAILY
Qty: 90 TABLET | Refills: 0 | Status: ON HOLD | OUTPATIENT
Start: 2023-05-03 | End: 2023-08-03 | Stop reason: HOSPADM

## 2023-05-03 RX ORDER — INSULIN DETEMIR 100 [IU]/ML
5 INJECTION, SOLUTION SUBCUTANEOUS NIGHTLY
Refills: 0 | Status: CANCELLED | OUTPATIENT
Start: 2023-05-03

## 2023-05-03 RX ADMIN — ACETAMINOPHEN 1000 MG: 500 TABLET ORAL at 06:05

## 2023-05-03 RX ADMIN — Medication: at 10:05

## 2023-05-03 RX ADMIN — CHOLECALCIFEROL TAB 25 MCG (1000 UNIT) 1000 UNITS: 25 TAB at 09:05

## 2023-05-03 RX ADMIN — ATORVASTATIN CALCIUM 40 MG: 40 TABLET, FILM COATED ORAL at 09:05

## 2023-05-03 RX ADMIN — ACETAMINOPHEN 1000 MG: 500 TABLET ORAL at 09:05

## 2023-05-03 RX ADMIN — OXYCODONE HYDROCHLORIDE 5 MG: 5 TABLET ORAL at 10:05

## 2023-05-03 RX ADMIN — DULOXETINE 30 MG: 30 CAPSULE, DELAYED RELEASE ORAL at 09:05

## 2023-05-03 RX ADMIN — COLLAGENASE SANTYL: 250 OINTMENT TOPICAL at 10:05

## 2023-05-03 RX ADMIN — CARVEDILOL 12.5 MG: 12.5 TABLET, FILM COATED ORAL at 10:05

## 2023-05-03 RX ADMIN — OXYCODONE HYDROCHLORIDE 5 MG: 5 TABLET ORAL at 09:05

## 2023-05-03 RX ADMIN — PANTOPRAZOLE SODIUM 40 MG: 40 TABLET, DELAYED RELEASE ORAL at 03:05

## 2023-05-03 RX ADMIN — PIPERACILLIN SODIUM AND TAZOBACTAM SODIUM 4.5 G: 4; .5 INJECTION, POWDER, FOR SOLUTION INTRAVENOUS at 04:05

## 2023-05-03 RX ADMIN — PANTOPRAZOLE SODIUM 40 MG: 40 TABLET, DELAYED RELEASE ORAL at 06:05

## 2023-05-03 RX ADMIN — ACETAMINOPHEN 1000 MG: 500 TABLET ORAL at 03:05

## 2023-05-03 RX ADMIN — DULOXETINE 30 MG: 30 CAPSULE, DELAYED RELEASE ORAL at 10:05

## 2023-05-03 RX ADMIN — MICAFUNGIN SODIUM 100 MG: 100 INJECTION, POWDER, LYOPHILIZED, FOR SOLUTION INTRAVENOUS at 10:05

## 2023-05-03 RX ADMIN — CARVEDILOL 25 MG: 25 TABLET, FILM COATED ORAL at 09:05

## 2023-05-03 RX ADMIN — PIPERACILLIN SODIUM AND TAZOBACTAM SODIUM 4.5 G: 4; .5 INJECTION, POWDER, FOR SOLUTION INTRAVENOUS at 03:05

## 2023-05-03 RX ADMIN — INSULIN ASPART 2 UNITS: 100 INJECTION, SOLUTION INTRAVENOUS; SUBCUTANEOUS at 12:05

## 2023-05-03 RX ADMIN — AMLODIPINE BESYLATE 10 MG: 10 TABLET ORAL at 10:05

## 2023-05-03 NOTE — SUBJECTIVE & OBJECTIVE
Subjective:     Interval History: Hypertensive, other vitals stable. No new pedal complaints. Dressings clean dry, intact.     Follow-up For: Procedure(s) (LRB):  EGD (ESOPHAGOGASTRODUODENOSCOPY) (N/A)    Post-Operative Day: 1 Day Post-Op    Scheduled Meds:   acetaminophen  1,000 mg Oral Q8H    amLODIPine  10 mg Oral Daily    ammonium lactate   Topical (Top) Daily    atorvastatin  40 mg Oral QHS    carvediloL  25 mg Oral BID    collagenase   Topical (Top) Daily    DULoxetine  30 mg Oral BID    micafungin (MYCAMINE) IVPB  100 mg Intravenous Q24H    pantoprazole  40 mg Oral BID AC    piperacillin-tazobactam (ZOSYN) IVPB  4.5 g Intravenous Q12H    vitamin D  1,000 Units Oral Daily     Continuous Infusions:  PRN Meds:sodium chloride, dextrose 10%, dextrose 10%, dextrose, dextrose, glucagon (human recombinant), insulin aspart U-100, loperamide, naloxone, ondansetron, oxyCODONE, sodium chloride 0.9%, vits A and D-white pet-lanolin    Review of Systems   Constitutional:  Negative for activity change, appetite change and fever.   HENT:  Negative for congestion and sore throat.    Respiratory:  Negative for cough, shortness of breath and wheezing.    Cardiovascular:  Negative for chest pain and leg swelling.   Gastrointestinal:  Negative for abdominal pain, constipation, diarrhea, nausea and vomiting.        Dark stools   Genitourinary:  Negative for difficulty urinating and dysuria.   Musculoskeletal:  Positive for arthralgias and myalgias.   Skin:  Positive for color change and wound. Negative for rash.   Allergic/Immunologic: Negative.    Neurological:  Negative for dizziness and headaches.   Hematological: Negative.    Objective:     Vital Signs (Most Recent):  Temp: 97.5 °F (36.4 °C) (05/03/23 1618)  Pulse: 83 (05/03/23 1618)  Resp: 18 (05/03/23 1618)  BP: (!) 161/72 (05/03/23 1618)  SpO2: 99 % (05/03/23 1618)   Vital Signs (24h Range):  Temp:  [97.5 °F (36.4 °C)-98.2 °F (36.8 °C)] 97.5 °F (36.4 °C)  Pulse:  [77-95]  83  Resp:  [11-22] 18  SpO2:  [95 %-99 %] 99 %  BP: (157-167)/(58-72) 161/72     Weight: 68 kg (149 lb 14.6 oz)  Body mass index is 24.95 kg/m².    Foot Exam    General  Orientation: alert and oriented to person, place, and time   Affect: appropriate       Right Foot/Ankle     Inspection and Palpation  Tenderness: (TMA site, mild)  Swelling: none   Skin Exam: abnormal color and ulcer (TMA incision site); no drainage (resolved) and no maceration (resolved)     Neurovascular  Dorsalis pedis: doppler  Posterior tibial: doppler  Saphenous nerve sensation: diminished  Tibial nerve sensation: diminished  Superficial peroneal nerve sensation: diminished  Deep peroneal nerve sensation: diminished  Sural nerve sensation: diminished    Comments  -Dry stable eschar across TMA site, no drainage, moderately tender.  -Heel with partial thickness skin breakdown.     Left Foot/Ankle      Inspection and Palpation  Tenderness: (rest pain LLE)  Swelling: none   Skin Exam: blister;     Neurovascular  Dorsalis pedis: absent  Posterior tibial: doppler  Saphenous nerve sensation: diminished  Tibial nerve sensation: diminished  Superficial peroneal nerve sensation: diminished  Deep peroneal nerve sensation: diminished  Sural nerve sensation: diminished    Comments  -Dorsal foot blister previously ruptured, no fluctuance or drainage, no open wound. Darkness surrounding blister and extending to bases of toes.   -Heel with partial thickness skin breakdown with overlying blister/bulla, pierced, fluid cultured, left intact.     Laboratory:  CBC:   Recent Labs   Lab 05/03/23  1659   WBC 8.67   RBC 3.15*   HGB 8.4*   HCT 26.7*      MCV 85   MCH 26.7*   MCHC 31.5*     CMP:   Recent Labs   Lab 05/03/23  0335      CALCIUM 8.9   ALBUMIN 2.1*   PROT 7.1      K 3.8   CO2 22*      BUN 25*   CREATININE 2.0*   ALKPHOS 52*   ALT 6*   AST 15   BILITOT 0.4     Wound Cultures:   Recent Labs   Lab 03/04/23  1141 03/10/23  1340  03/31/23  0805 03/31/23  0809   LABAERO No growth PSEUDOMONAS FLUORESCENS [facility]  Few  * FELI ALBICANS  Rare  * FELI GLABRATA  Rare  *     Microbiology Results (last 7 days)       Procedure Component Value Units Date/Time    Gram stain [[id number]] Collected: 05/03/23 1047    Order Status: Completed Specimen: Wound from Foot, Left Updated: 05/03/23 1832     Gram Stain Result Few WBC's      No organisms seen    Narrative:      Left heel wound    Culture, Anaerobe [[id number]] Collected: 05/03/23 1047    Order Status: Sent Specimen: Wound from Foot, Left Updated: 05/03/23 1217    Aerobic culture [[id number]] Collected: 05/03/23 1047    Order Status: Sent Specimen: Wound from Foot, Left Updated: 05/03/23 1217          Specimen (24h ago, onward)      None          All pertinent labs reviewed within the last 24 hours.    Diagnostic Results:  I have reviewed all pertinent imaging results/findings within the past 24 hours.    Clinical Findings:  New alteration in skin integrity of the left heel and left distal foot worsening of ischemic /early gangrenous changes

## 2023-05-03 NOTE — ASSESSMENT & PLAN NOTE
- PT/OT reconsulted  - Fall precautions  - Established with HH PT/OT/nurse aide prior to hospitalization  - SW assisting in SNF placement ; however insurance did not approve    Will d/c to home with HH

## 2023-05-03 NOTE — ASSESSMENT & PLAN NOTE
Patient with acute kidney injury likely due to IVVD/dehydration LOLY is currently worsening. Labs reviewed- Renal function/electrolytes with Estimated Creatinine Clearance: 19.5 mL/min (A) (based on SCr of 2 mg/dL (H)). according to latest data. Monitor urine output and serial BMP and adjust therapy as needed. Avoid nephrotoxins and renally dose meds for GFR listed above.     Cr 2.1 (baseline 0.8-1.5).  Patient dry on physical exam.  Poor oral intake, thought to be prerenal in origin initially. Possible due to acute on chronic anemia.    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

## 2023-05-03 NOTE — ASSESSMENT & PLAN NOTE
Baseline 8.6-11.   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      - CBC qd, trend   - transfuse pRBC for Hb < 7 g/dL; transfuse for < 8 if ACS or active bleeding and unresponsive to volume resuscitation with evidence of end-organ ischemia/>20% blood volume loss with HD instablity/>30% blood volume loss

## 2023-05-03 NOTE — SUBJECTIVE & OBJECTIVE
Interval History: Hgb improved overnight without melena. Insurance denied LTAC and SNF. Will set up  for 5/4    Review of Systems   Constitutional:  Negative for activity change, appetite change and fever.   HENT:  Negative for congestion and sore throat.    Respiratory:  Negative for cough, shortness of breath and wheezing.    Cardiovascular:  Negative for chest pain and leg swelling.   Gastrointestinal:  Negative for abdominal pain, constipation, diarrhea, nausea and vomiting.        Dark stools   Genitourinary:  Negative for difficulty urinating and dysuria.   Musculoskeletal:  Positive for arthralgias and myalgias.   Skin:  Positive for color change and wound. Negative for rash.   Allergic/Immunologic: Negative.    Neurological:  Negative for dizziness and headaches.   Hematological: Negative.    Objective:     Vital Signs (Most Recent):  Temp: 98.2 °F (36.8 °C) (05/03/23 1035)  Pulse: 89 (05/03/23 1035)  Resp: 19 (05/03/23 1035)  BP: (!) 166/70 (05/03/23 1035)  SpO2: 97 % (05/03/23 1035)   Vital Signs (24h Range):  Temp:  [96.8 °F (36 °C)-98.2 °F (36.8 °C)] 98.2 °F (36.8 °C)  Pulse:  [77-95] 89  Resp:  [11-22] 19  SpO2:  [95 %-97 %] 97 %  BP: (157-167)/(58-72) 166/70     Weight: 68 kg (149 lb 14.6 oz)  Body mass index is 24.95 kg/m².    Intake/Output Summary (Last 24 hours) at 5/3/2023 1511  Last data filed at 5/3/2023 0625  Gross per 24 hour   Intake --   Output 350 ml   Net -350 ml        Physical Exam  Vitals and nursing note reviewed.   Constitutional:       General: She is not in acute distress.     Appearance: She is not ill-appearing.   HENT:      Head: Normocephalic and atraumatic.      Mouth/Throat:      Mouth: Mucous membranes are moist.      Pharynx: Oropharynx is clear.   Eyes:      Extraocular Movements: Extraocular movements intact.      Conjunctiva/sclera: Conjunctivae normal.   Cardiovascular:      Rate and Rhythm: Normal rate and regular rhythm.      Pulses: Normal pulses.   Pulmonary:       Effort: Pulmonary effort is normal. No respiratory distress.      Breath sounds: No wheezing or rales.   Abdominal:      Palpations: Abdomen is soft.      Tenderness: There is no abdominal tenderness. There is no guarding.   Musculoskeletal:      Cervical back: Normal range of motion and neck supple.      Right lower leg: No edema.      Left lower leg: No edema.   Skin:     General: Skin is warm.      Findings: Lesion (R heel, L dorsum with darkened skin) present.      Comments: Insicion on right inner thigh- c/d/I- no drainage or erythema seen.  Wound on left thigh- with wound vac in place and drain in place with serosanguinous fluid.    Neurological:      General: No focal deficit present.      Mental Status: She is oriented to person, place, and time. Mental status is at baseline.       Significant Labs: All pertinent labs within the past 24 hours have been reviewed.  CBC:   Recent Labs   Lab 05/02/23  0521 05/02/23  1554 05/03/23  0335   WBC 8.36 9.10 8.37   HGB 8.4* 9.1* 8.8*   HCT 27.5* 30.1* 28.4*    228 265       CMP:   Recent Labs   Lab 05/02/23  0521 05/03/23  0335    143   K 3.8 3.8    110   CO2 26 22*   * 103   BUN 29* 25*   CREATININE 2.0* 2.0*   CALCIUM 9.0 8.9   PROT 7.3 7.1   ALBUMIN 2.2* 2.1*   BILITOT 1.0 0.4   ALKPHOS 50* 52*   AST 15 15   ALT <5* 6*   ANIONGAP 8 11         Significant Imaging: I have reviewed all pertinent imaging results/findings within the past 24 hours.

## 2023-05-03 NOTE — PROGRESS NOTES
[facility] Hospital Medicine  Progress Note    Patient Name: Nelsy Marino  MRN: [id number]  Patient Class: IP- Inpatient   Admission Date: 4/20/2023  Length of Stay: 11 days  Attending Physician: Shadi Vidal MD  Primary Care Provider: Juan F Garay        Subjective:     Principal Problem:LOLY (acute kidney injury)        HPI:  This is an 82-year-old female with a past medical history of severe PAD, hypertension, type 2 diabetes, HFpEF (EF 65%), L femoral bypass and L leg flap, R toe ampuations 2 months prior with presenting after  noted waxing/waning confusion since her discharge from hospital two weeks ago. History largely provided by  at bedside as patient is intermittently somnolent. She was discharged on IV Cefepime and Micafungin for her R foot amputation and patient reports adherence with regimen at home. He grew concerned that int he past two weeks she had worsening LLE pain and darkened skin, which is what happened before her R toes were amputated. His niece contacted a nurse who recommneded ED evaluation. No fevers, chills, HA, chest pain, SOB, N/V/D, abdominal pain.    Pt receives  nurse aide, PT/OT since hospital DC and ID very recently made a referral to podiatry and wound care, but appointments not yet made.     In the ED patient was hemodynamically stable and hypertensive, intermittently on RA to 2L NC. Leukocytosis to 13, Hgb 7.5, Cr 2.1, Albumin 2.1, BCx in process, CT head and CXR wnl. Started on BS Abx with continued Cefepime, [place], started on Vanc pending Bcx, US LE arterial with diminished flow throughout the left lower extremity. Monophasic waveforms suggesting atherosclerotic disease. Admitted to  for LOLY and Podiatry/Wound care.       Overview/Hospital Course:  Admitted to hospital medicine for LOLY and left foot pain. Cr on admission 2.1 (bl.8-1.5), improved with IVF initially. After holding IVF Cr increased, and then unresponsive to fluids. Patient with  significant urine retention, rivas placed and Cr again increased. Consulted Nephrology for assistance, suspect due to acute drop in Hgb. LOLY improved 4/27. Nephrology recommended heme/onc evaluation for this patient regarding the three monoclonal bands seen on SPE. Spoke with heme/onc regarding case, labs ordered in anticipation of outpatient evaluation given that patient stable. Patient having increased soft bowel movements with melena 4/25. GI consulted performed EGD with small bowel follow through 4/26 revealed red blood in the gastric body with three bleeding angioectasias in the stomach treated with APC. DAPT held in setting of acute bleed. Discussed concern of rebleeding AVM possibly due to DAPT with vascular. ASA trial for 1 day after patient with 2-3 days without melena resulted in 5 dark tarry BM. ASA d/c and GI reconsulted after Hgb from 8 to 6.1 overnight 5/1. EGD with push enteroscopy 5/2 revealed single bleeding angioectasia in the duodenum, treated with argon plasma coagulation (APC). Continued PO PPI BID.     Vascular surgery consulted on admission, recommended repeat ARCHIE and left foot arterial doppler. Podiatry also saw the patient for left foot wound, recommended wound vac placement. Both Vascular and Podiatry felt no further intervention necessary at this time.     ID consulted, patient was receiving cefepime and micafungin outpatient per ID following R foot transmetatarsal amputation. Started on vanc, cefepime, and micafungin. Patient more lethargic 4/22 although able to follow commands and move extremities on exam. With concern for cefepime toxicity, ID removed cefepime and vancomycin. Micafungin and zosyn continued. EEG and CTH ordered, no acute abnormalities. Patient significantly improved following d/c Cefepime to baseline. Patient to continue zosyn and micafungin until ID follow up 5/17 and then continue keflex ppx thereafter per ID.    SLP consulted recommended pureed dysphagia diet. PT/OT  recommended SNF placement, but insurance did not approve. Medically stable to discharge home with home health 5/4/23.       Interval History: Hgb improved overnight without melena. Insurance denied LTAC and SNF. Will set up HH for 5/4    Review of Systems   Constitutional:  Negative for activity change, appetite change and fever.   HENT:  Negative for congestion and sore throat.    Respiratory:  Negative for cough, shortness of breath and wheezing.    Cardiovascular:  Negative for chest pain and leg swelling.   Gastrointestinal:  Negative for abdominal pain, constipation, diarrhea, nausea and vomiting.        Dark stools   Genitourinary:  Negative for difficulty urinating and dysuria.   Musculoskeletal:  Positive for arthralgias and myalgias.   Skin:  Positive for color change and wound. Negative for rash.   Allergic/Immunologic: Negative.    Neurological:  Negative for dizziness and headaches.   Hematological: Negative.    Objective:     Vital Signs (Most Recent):  Temp: 98.2 °F (36.8 °C) (05/03/23 1035)  Pulse: 89 (05/03/23 1035)  Resp: 19 (05/03/23 1035)  BP: (!) 166/70 (05/03/23 1035)  SpO2: 97 % (05/03/23 1035)   Vital Signs (24h Range):  Temp:  [96.8 °F (36 °C)-98.2 °F (36.8 °C)] 98.2 °F (36.8 °C)  Pulse:  [77-95] 89  Resp:  [11-22] 19  SpO2:  [95 %-97 %] 97 %  BP: (157-167)/(58-72) 166/70     Weight: 68 kg (149 lb 14.6 oz)  Body mass index is 24.95 kg/m².    Intake/Output Summary (Last 24 hours) at 5/3/2023 1511  Last data filed at 5/3/2023 0625  Gross per 24 hour   Intake --   Output 350 ml   Net -350 ml        Physical Exam  Vitals and nursing note reviewed.   Constitutional:       General: She is not in acute distress.     Appearance: She is not ill-appearing.   HENT:      Head: Normocephalic and atraumatic.      Mouth/Throat:      Mouth: Mucous membranes are moist.      Pharynx: Oropharynx is clear.   Eyes:      Extraocular Movements: Extraocular movements intact.      Conjunctiva/sclera: Conjunctivae  normal.   Cardiovascular:      Rate and Rhythm: Normal rate and regular rhythm.      Pulses: Normal pulses.   Pulmonary:      Effort: Pulmonary effort is normal. No respiratory distress.      Breath sounds: No wheezing or rales.   Abdominal:      Palpations: Abdomen is soft.      Tenderness: There is no abdominal tenderness. There is no guarding.   Musculoskeletal:      Cervical back: Normal range of motion and neck supple.      Right lower leg: No edema.      Left lower leg: No edema.   Skin:     General: Skin is warm.      Findings: Lesion (R heel, L dorsum with darkened skin) present.      Comments: Insicion on right inner thigh- c/d/I- no drainage or erythema seen.  Wound on left thigh- with wound vac in place and drain in place with serosanguinous fluid.    Neurological:      General: No focal deficit present.      Mental Status: She is oriented to person, place, and time. Mental status is at baseline.       Significant Labs: All pertinent labs within the past 24 hours have been reviewed.  CBC:   Recent Labs   Lab 05/02/23  0521 05/02/23  1554 05/03/23  0335   WBC 8.36 9.10 8.37   HGB 8.4* 9.1* 8.8*   HCT 27.5* 30.1* 28.4*    228 265       CMP:   Recent Labs   Lab 05/02/23  0521 05/03/23  0335    143   K 3.8 3.8    110   CO2 26 22*   * 103   BUN 29* 25*   CREATININE 2.0* 2.0*   CALCIUM 9.0 8.9   PROT 7.3 7.1   ALBUMIN 2.2* 2.1*   BILITOT 1.0 0.4   ALKPHOS 50* 52*   AST 15 15   ALT <5* 6*   ANIONGAP 8 11         Significant Imaging: I have reviewed all pertinent imaging results/findings within the past 24 hours.      Assessment/Plan:      * LOLY (acute kidney injury)  Patient with acute kidney injury likely due to IVVD/dehydration LOLY is currently worsening. Labs reviewed- Renal function/electrolytes with Estimated Creatinine Clearance: 19.5 mL/min (A) (based on SCr of 2 mg/dL (H)). according to latest data. Monitor urine output and serial BMP and adjust therapy as needed. Avoid  nephrotoxins and renally dose meds for GFR listed above.     Cr 2.1 (baseline 0.8-1.5).  Patient dry on physical exam.  Poor oral intake, thought to be prerenal in origin initially. Possible due to acute on chronic anemia.    Recent Labs   Lab 05/02/23  0521 05/03/23  0335    143   K 3.8 3.8    110   CO2 26 22*   BUN 29* 25*   CREATININE 2.0* 2.0*     - Nephrology consulted; appreciate assistance   - Nephrology f/u outpatient  - Will refer to heme/onc on discharge for further workup of three monoclonal bands seen on the SPE  - [place] placed after patient retaining on bladder scan 4/23 with hematuria 4/24  - Voiding trial 4/27 successful  - RP u/s no hydronephrosis  - Urine lytes showed possible intrinsic etiology  - CMP qd, trend Cr  - strict I/O  - daily weights  - renally dose all medications  - avoid nephrotoxic agents, NSAIDs, IV contrast, ACE/ARB      Debility  - PT/OT reconsulted  - Fall precautions  - Established with  PT/OT/nurse aide prior to hospitalization  -  assisting in SNF placement ; however insurance did not approve    Will d/c to home with      Skin ulcer of right heel, limited to breakdown of skin  See PVD for plan    - Wound care and podiatry consulted with appreciated mgmt recs      Acute on chronic anemia  Baseline 8.6-11.   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  GI consulted push enteroscopy 4/26 showed red blood in the gastric body and three bleeding  angioectasias in the stomach treated with APC  5/1 Drop in Hgb after several melanotic stools. GI re consulted; completed EGD with push enteroscopy 5/2/23, 1 new bleeding AVM treated with APC    - Continue PPI BID  - Maintain 2 large bore Ivs for volume resuscitation  - Trend CBC BID. Transfuse pRBC for Hb < 7 g/dL   - Stopped anticoagulation for now        Acute encephalopathy  A&O x3 on admission with waxing/waning per  at the bedside.  says she has been largely bed bound since discharge, but her waxing confusion was concerning to him. Patients mentation significantly improved after stopping cefepime.  Resolved; likely 2/2 cefepime neurotoxicity   - Repeat CT Head with no acute change  - EEG without seizures  - TSH, B12, B1, Vit E, Ammonia, RPR were unremarkable  - Cefepime changed to Zosyn  - F/u blood and Urine cultures      S/P transmetatarsal amputation of foot, right  Appreciated evaluation by Podiatry      Diastolic CHF, chronic  Patient is identified as having Diastolic (HFpEF) heart failure that is Chronic. CHF is currently controlled. Latest ECHO performed and demonstrates- Results for orders placed during the hospital encounter of 02/27/23    Echo    Interpretation Summary  · The estimated ejection fraction is 65%.  · The left ventricle is normal in size with normal systolic function.  · Grade II left ventricular diastolic dysfunction.  · Normal right ventricular size with normal right ventricular systolic function.  · Mild left atrial enlargement.  · Mild-to-moderate mitral regurgitation.  · There is pulmonary hypertension.  · The estimated PA systolic pressure is 55 mmHg.  · Intermediate central venous pressure (8 mmHg).  . Continue Beta Blocker and Furosemide and monitor clinical status closely. Monitor on telemetry. Patient is off CHF pathway.  Monitor strict Is&Os and daily weights.  Place on fluid restriction of 1.5 L. Continue to stress to patient importance of self efficacy and   on diet for CHF. Last BNP reviewed- and noted below No results for input(s): BNP, BNPTRIAGEBLO in the last 168 hours..    - Holding home Lasix in setting of LOLY  - Holding Hyzaar (HCTZ) in setting of LOLY  - Patient breathing comfortably on RA, no signs of volume overload      Type 2 diabetes mellitus with chronic kidney disease, with long-term current use of insulin  Patient's FSGs are uncontrolled due to hyperglycemia on current medication regimen.  Last A1c reviewed-   Lab Results   Component Value Date    HGBA1C 8.2 (H) 02/28/2023     Most recent fingerstick glucose reviewed-   Recent Labs   Lab 04/27/23  1532 04/27/23  2031 04/28/23  0724   POCTGLUCOSE 189* 188* 157*     Current correctional scale  Low  Maintain anti-hyperglycemic dose as follows-   Antihyperglycemics (From admission, onward)    Start     Stop Route Frequency Ordered    04/21/23 0355  insulin aspart U-100 pen 0-5 Units         -- SubQ Before meals & nightly PRN 04/21/23 0255        Hold Oral hypoglycemics while patient is in the hospital.    Essential hypertension  - Continue home Coreg, amlodipine  - hold Hyzaar in setting of LOLY      PAD (peripheral artery disease)  Recently discharged home with IV Cefepime 2g Q12 hours + IV Micafungin 100mg Q24 hours for estimated end of therapy date of 5/16/2023. For R toe amputation. Patient's  reports adherence with IV Abx. L foot with darkening skin changes, which alarmed  as this was how skin presented prior to toe amputations of R foot. RLE warm to touch with L US Lower Extremity Arteries demonstrating diminished flow throughout the left lower extremity. Monophasic waveforms suggesting atherosclerotic disease.     - Podiatry and Vascular Surgery following; appreciate recs  - Restarted ASA 4/29, but discontinued after melena. Will not resume DAPT on d/c  - Continue Micafungin and Zosyn per prior ID recs until 5/17 to then continue Keflex ppx per ID thereafter for graft  infection      VTE Risk Mitigation (From admission, onward)         Ordered     IP VTE HIGH RISK PATIENT  Once         04/21/23 0254     Place sequential compression device  Until discontinued         04/21/23 0254                Discharge Planning   JUAN ALBERTO: 5/4/2023     Code Status: Full Code   Is the patient medically ready for discharge?: Yes    Reason for patient still in hospital (select all that apply): Pending disposition  Discharge Plan A: Skilled Nursing Facility                  Boris Mckeon DO  Department of Hospital Medicine   [facility]

## 2023-05-03 NOTE — PLAN OF CARE
Sw asked to send out LTAC referrals for abx, wound care and a wound vac, Sw to follow with acceptance and will need insurance approval. Informed by [facility] that LTAC will not be approved by insurance. Pt will dc home with HH and abx with a wound vac.

## 2023-05-03 NOTE — PROGRESS NOTES
[facility]  Gastroenterology  Progress Note    Patient Name: Nelsy Marino  MRN: [id number]  Admission Date: 4/20/2023  Hospital Length of Stay: 11 days  Code Status: Full Code   Attending Provider: Shadi Vidal MD  Consulting Provider: Jada Ojeda NP  Primary Care Physician: Juan F Garay  Principal Problem: LOLY (acute kidney injury)      Subjective:     Interval History: Followed up with patient s/p EGD yesterday with multiple angioectasias treated with APC. Denies any signs of bleeding overnight. Did report a BM this AM, but uncertain of color and consistency. Overall, patient reports feeling well. Blood counts have maintained. Vitals stable. Afebrile.     Review of Systems   Constitutional:  Negative for activity change, appetite change and fever.   HENT:  Negative for sore throat and trouble swallowing.    Gastrointestinal:  Positive for blood in stool (dark stools). Negative for abdominal distention, abdominal pain, anal bleeding, constipation, diarrhea, nausea, rectal pain and vomiting.   Skin:  Negative for color change and pallor.   Neurological:  Negative for dizziness, syncope and weakness.   Objective:     Vital Signs (Most Recent):  Temp: 98 °F (36.7 °C) (05/03/23 0801)  Pulse: 94 (05/03/23 0824)  Resp: (!) 22 (05/03/23 0824)  BP: (!) 167/60 (05/03/23 0824)  SpO2: 97 % (05/03/23 0824)   Vital Signs (24h Range):  Temp:  [96.8 °F (36 °C)-98.9 °F (37.2 °C)] 98 °F (36.7 °C)  Pulse:  [67-95] 94  Resp:  [11-22] 22  SpO2:  [95 %-100 %] 97 %  BP: (142-175)/() 167/60     Weight: 68 kg (149 lb 14.6 oz) (04/28/23 1039)  Body mass index is 24.95 kg/m².      Intake/Output Summary (Last 24 hours) at 5/3/2023 0838  Last data filed at 5/3/2023 0625  Gross per 24 hour   Intake 200 ml   Output 350 ml   Net -150 ml       Lines/Drains/Airways       Peripherally Inserted Central Catheter Line  Duration             PICC Double Lumen 04/05/23 1107 right basilic 27 days              Drain  Duration                   Closed/Suction Drain 03/31/23 0953 Left;Anterior;Lateral Thigh Bulb 15 Fr. 32 days                    Physical Exam  Vitals and nursing note reviewed.   Constitutional:       General: She is not in acute distress.     Appearance: She is normal weight. She is not ill-appearing.   HENT:      Mouth/Throat:      Mouth: Mucous membranes are moist.      Pharynx: Oropharynx is clear.   Eyes:      General: No scleral icterus.  Abdominal:      General: Bowel sounds are normal. There is no distension.      Palpations: Abdomen is soft. There is no mass.      Tenderness: There is no abdominal tenderness.      Hernia: No hernia is present.   Skin:     General: Skin is warm and dry.      Capillary Refill: Capillary refill takes less than 2 seconds.      Coloration: Skin is not jaundiced or pale.      Findings: No bruising or erythema.   Neurological:      Mental Status: She is alert and oriented to person, place, and time. Mental status is at baseline.       Significant Labs:  CBC:   Recent Labs   Lab 05/02/23  0521 05/02/23  1554 05/03/23  0335   WBC 8.36 9.10 8.37   HGB 8.4* 9.1* 8.8*   HCT 27.5* 30.1* 28.4*    228 265     CMP:   Recent Labs   Lab 05/03/23  0335      CALCIUM 8.9   ALBUMIN 2.1*   PROT 7.1      K 3.8   CO2 22*      BUN 25*   CREATININE 2.0*   ALKPHOS 52*   ALT 6*   AST 15   BILITOT 0.4     Coagulation: No results for input(s): PT, INR, APTT in the last 48 hours.      Significant Imaging:  Imaging results within the past 24 hours have been reviewed.    Assessment/Plan:     GI  Melena  Patient reports overall feeling better. Counts stable. Possible discharge home today.     - Can advance diet as tolerated  - Strict ER precautions reviewed with patient and  for further episodes of bleeding.  - Continue PPI PO daily    - If patient has recurrent bleeding, will need device assisted enteroscopy.              Thank you for your consult. After careful review of labs and  patient current status with the GI staff and fellow, it has been decided that I will sign off. Please contact us if you have any additional questions.    Jada Ojeda NP  Gastroenterology  [facility]

## 2023-05-03 NOTE — PLAN OF CARE
Problem: Adult Inpatient Plan of Care  Goal: Plan of Care Review  Outcome: Ongoing, Progressing  Goal: Patient-Specific Goal (Individualized)  Outcome: Ongoing, Progressing  Goal: Absence of Hospital-Acquired Illness or Injury  Outcome: Ongoing, Progressing  Goal: Optimal Comfort and Wellbeing  Outcome: Ongoing, Progressing  Goal: Readiness for Transition of Care  Outcome: Ongoing, Progressing     Problem: Diabetes Comorbidity  Goal: Blood Glucose Level Within Targeted Range  Outcome: Ongoing, Progressing     Problem: Adjustment to Illness (Sepsis/Septic Shock)  Goal: Optimal Coping  Outcome: Ongoing, Progressing     Problem: Bleeding (Sepsis/Septic Shock)  Goal: Absence of Bleeding  Outcome: Ongoing, Progressing     Problem: Glycemic Control Impaired (Sepsis/Septic Shock)  Goal: Blood Glucose Level Within Desired Range  Outcome: Ongoing, Progressing     Problem: Infection Progression (Sepsis/Septic Shock)  Goal: Absence of Infection Signs and Symptoms  Outcome: Ongoing, Progressing     Problem: Nutrition Impaired (Sepsis/Septic Shock)  Goal: Optimal Nutrition Intake  Outcome: Ongoing, Progressing     Problem: Infection  Goal: Absence of Infection Signs and Symptoms  Outcome: Ongoing, Progressing     Problem: Fluid and Electrolyte Imbalance (Acute Kidney Injury/Impairment)  Goal: Fluid and Electrolyte Balance  Outcome: Ongoing, Progressing     Problem: Oral Intake Inadequate (Acute Kidney Injury/Impairment)  Goal: Optimal Nutrition Intake  Outcome: Ongoing, Progressing     Problem: Renal Function Impairment (Acute Kidney Injury/Impairment)  Goal: Effective Renal Function  Outcome: Ongoing, Progressing     Problem: Skin Injury Risk Increased  Goal: Skin Health and Integrity  Outcome: Ongoing, Progressing     Problem: Fall Injury Risk  Goal: Absence of Fall and Fall-Related Injury  Outcome: Ongoing     Pt AAO x 4. Patient rested comfortably with no significant changes during the shift, remains free from falls and  injuries. Side rails up x2, bed low and locked, call light and personal belongings within reach. VS remain stable. Questions and concerns addressed and answered. Medication compliance. Pain control with PRN analgesic Oxycodone with positive results. Family remains at bedside. Will continue to monitor.

## 2023-05-03 NOTE — ASSESSMENT & PLAN NOTE
Patient reports overall feeling better. Counts stable. Possible discharge home today.     - Can advance diet as tolerated  - Strict ER precautions reviewed with patient and  for further episodes of bleeding.  - Continue PPI PO daily    - If patient has recurrent bleeding, will need device assisted enteroscopy.

## 2023-05-03 NOTE — SUBJECTIVE & OBJECTIVE
Subjective:     Interval History: Followed up with patient s/p EGD yesterday with multiple angioectasias treated with APC. Denies any signs of bleeding overnight. Did report a BM this AM, but uncertain of color and consistency. Overall, patient reports feeling well. Blood counts have maintained. Vitals stable. Afebrile.     Review of Systems   Constitutional:  Negative for activity change, appetite change and fever.   HENT:  Negative for sore throat and trouble swallowing.    Gastrointestinal:  Positive for blood in stool (dark stools). Negative for abdominal distention, abdominal pain, anal bleeding, constipation, diarrhea, nausea, rectal pain and vomiting.   Skin:  Negative for color change and pallor.   Neurological:  Negative for dizziness, syncope and weakness.   Objective:     Vital Signs (Most Recent):  Temp: 98 °F (36.7 °C) (05/03/23 0801)  Pulse: 94 (05/03/23 0824)  Resp: (!) 22 (05/03/23 0824)  BP: (!) 167/60 (05/03/23 0824)  SpO2: 97 % (05/03/23 0824)   Vital Signs (24h Range):  Temp:  [96.8 °F (36 °C)-98.9 °F (37.2 °C)] 98 °F (36.7 °C)  Pulse:  [67-95] 94  Resp:  [11-22] 22  SpO2:  [95 %-100 %] 97 %  BP: (142-175)/() 167/60     Weight: 68 kg (149 lb 14.6 oz) (04/28/23 1039)  Body mass index is 24.95 kg/m².      Intake/Output Summary (Last 24 hours) at 5/3/2023 0838  Last data filed at 5/3/2023 0625  Gross per 24 hour   Intake 200 ml   Output 350 ml   Net -150 ml       Lines/Drains/Airways       Peripherally Inserted Central Catheter Line  Duration             PICC Double Lumen 04/05/23 1107 right basilic 27 days              Drain  Duration                  Closed/Suction Drain 03/31/23 0953 Left;Anterior;Lateral Thigh Bulb 15 Fr. 32 days                    Physical Exam  Vitals and nursing note reviewed.   Constitutional:       General: She is not in acute distress.     Appearance: She is normal weight. She is not ill-appearing.   HENT:      Mouth/Throat:      Mouth: Mucous membranes are moist.       Pharynx: Oropharynx is clear.   Eyes:      General: No scleral icterus.  Abdominal:      General: Bowel sounds are normal. There is no distension.      Palpations: Abdomen is soft. There is no mass.      Tenderness: There is no abdominal tenderness.      Hernia: No hernia is present.   Skin:     General: Skin is warm and dry.      Capillary Refill: Capillary refill takes less than 2 seconds.      Coloration: Skin is not jaundiced or pale.      Findings: No bruising or erythema.   Neurological:      Mental Status: She is alert and oriented to person, place, and time. Mental status is at baseline.       Significant Labs:  CBC:   Recent Labs   Lab 05/02/23  0521 05/02/23  1554 05/03/23  0335   WBC 8.36 9.10 8.37   HGB 8.4* 9.1* 8.8*   HCT 27.5* 30.1* 28.4*    228 265     CMP:   Recent Labs   Lab 05/03/23  0335      CALCIUM 8.9   ALBUMIN 2.1*   PROT 7.1      K 3.8   CO2 22*      BUN 25*   CREATININE 2.0*   ALKPHOS 52*   ALT 6*   AST 15   BILITOT 0.4     Coagulation: No results for input(s): PT, INR, APTT in the last 48 hours.      Significant Imaging:  Imaging results within the past 24 hours have been reviewed.

## 2023-05-03 NOTE — ASSESSMENT & PLAN NOTE
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  GI consulted push enteroscopy 4/26 showed red blood in the gastric body and three bleeding angioectasias in the stomach treated with APC  5/1 Drop in Hgb after several melanotic stools. GI re consulted; completed EGD with push enteroscopy 5/2/23, 1 new bleeding AVM treated with APC    - Continue PPI BID  - Maintain 2 large bore Ivs for volume resuscitation  - Trend CBC BID. Transfuse pRBC for Hb < 7 g/dL   - Stopped anticoagulation for now

## 2023-05-04 LAB
ABO + RH BLD: ABNORMAL
ALBUMIN SERPL BCP-MCNC: 2.2 G/DL (ref 3.5–5.2)
ALP SERPL-CCNC: 53 U/L (ref 55–135)
ALT SERPL W/O P-5'-P-CCNC: 6 U/L (ref 10–44)
ANION GAP SERPL CALC-SCNC: 9 MMOL/L (ref 8–16)
AST SERPL-CCNC: 18 U/L (ref 10–40)
BASOPHILS # BLD AUTO: 0.02 K/UL (ref 0–0.2)
BASOPHILS # BLD AUTO: 0.02 K/UL (ref 0–0.2)
BASOPHILS NFR BLD: 0.2 % (ref 0–1.9)
BASOPHILS NFR BLD: 0.2 % (ref 0–1.9)
BILIRUB SERPL-MCNC: 0.3 MG/DL (ref 0.1–1)
BLD GP AB SCN CELLS X3 SERPL QL: ABNORMAL
BUN SERPL-MCNC: 22 MG/DL (ref 8–23)
CALCIUM SERPL-MCNC: 9 MG/DL (ref 8.7–10.5)
CHLORIDE SERPL-SCNC: 110 MMOL/L (ref 95–110)
CO2 SERPL-SCNC: 24 MMOL/L (ref 23–29)
CREAT SERPL-MCNC: 1.9 MG/DL (ref 0.5–1.4)
DIFFERENTIAL METHOD: ABNORMAL
DIFFERENTIAL METHOD: ABNORMAL
EOSINOPHIL # BLD AUTO: 0.5 K/UL (ref 0–0.5)
EOSINOPHIL # BLD AUTO: 0.5 K/UL (ref 0–0.5)
EOSINOPHIL NFR BLD: 5.6 % (ref 0–8)
EOSINOPHIL NFR BLD: 5.7 % (ref 0–8)
ERYTHROCYTE [DISTWIDTH] IN BLOOD BY AUTOMATED COUNT: 14.5 % (ref 11.5–14.5)
ERYTHROCYTE [DISTWIDTH] IN BLOOD BY AUTOMATED COUNT: 14.5 % (ref 11.5–14.5)
EST. GFR  (NO RACE VARIABLE): 26 ML/MIN/1.73 M^2
GLUCOSE SERPL-MCNC: 150 MG/DL (ref 70–110)
HCT VFR BLD AUTO: 28.2 % (ref 37–48.5)
HCT VFR BLD AUTO: 29.2 % (ref 37–48.5)
HGB BLD-MCNC: 8.9 G/DL (ref 12–16)
HGB BLD-MCNC: 9.2 G/DL (ref 12–16)
IMM GRANULOCYTES # BLD AUTO: 0.02 K/UL (ref 0–0.04)
IMM GRANULOCYTES # BLD AUTO: 0.04 K/UL (ref 0–0.04)
IMM GRANULOCYTES NFR BLD AUTO: 0.2 % (ref 0–0.5)
IMM GRANULOCYTES NFR BLD AUTO: 0.4 % (ref 0–0.5)
LYMPHOCYTES # BLD AUTO: 1.4 K/UL (ref 1–4.8)
LYMPHOCYTES # BLD AUTO: 1.6 K/UL (ref 1–4.8)
LYMPHOCYTES NFR BLD: 14.9 % (ref 18–48)
LYMPHOCYTES NFR BLD: 17.1 % (ref 18–48)
MAGNESIUM SERPL-MCNC: 1.5 MG/DL (ref 1.6–2.6)
MCH RBC QN AUTO: 27.2 PG (ref 27–31)
MCH RBC QN AUTO: 27.3 PG (ref 27–31)
MCHC RBC AUTO-ENTMCNC: 31.5 G/DL (ref 32–36)
MCHC RBC AUTO-ENTMCNC: 31.6 G/DL (ref 32–36)
MCV RBC AUTO: 86 FL (ref 82–98)
MCV RBC AUTO: 87 FL (ref 82–98)
MONOCYTES # BLD AUTO: 0.3 K/UL (ref 0.3–1)
MONOCYTES # BLD AUTO: 0.4 K/UL (ref 0.3–1)
MONOCYTES NFR BLD: 3.5 % (ref 4–15)
MONOCYTES NFR BLD: 3.8 % (ref 4–15)
NEUTROPHILS # BLD AUTO: 6.7 K/UL (ref 1.8–7.7)
NEUTROPHILS # BLD AUTO: 7 K/UL (ref 1.8–7.7)
NEUTROPHILS NFR BLD: 73.1 % (ref 38–73)
NEUTROPHILS NFR BLD: 75.3 % (ref 38–73)
NRBC BLD-RTO: 0 /100 WBC
NRBC BLD-RTO: 0 /100 WBC
PLATELET # BLD AUTO: 292 K/UL (ref 150–450)
PLATELET # BLD AUTO: 319 K/UL (ref 150–450)
PMV BLD AUTO: 10.6 FL (ref 9.2–12.9)
PMV BLD AUTO: 10.6 FL (ref 9.2–12.9)
POCT GLUCOSE: 135 MG/DL (ref 70–110)
POCT GLUCOSE: 144 MG/DL (ref 70–110)
POCT GLUCOSE: 155 MG/DL (ref 70–110)
POCT GLUCOSE: 181 MG/DL (ref 70–110)
POTASSIUM SERPL-SCNC: 3.8 MMOL/L (ref 3.5–5.1)
PROT SERPL-MCNC: 7.5 G/DL (ref 6–8.4)
RBC # BLD AUTO: 3.27 M/UL (ref 4–5.4)
RBC # BLD AUTO: 3.37 M/UL (ref 4–5.4)
SODIUM SERPL-SCNC: 143 MMOL/L (ref 136–145)
SPECIMEN OUTDATE: ABNORMAL
WBC # BLD AUTO: 9.22 K/UL (ref 3.9–12.7)
WBC # BLD AUTO: 9.31 K/UL (ref 3.9–12.7)

## 2023-05-04 PROCEDURE — 36415 COLL VENOUS BLD VENIPUNCTURE: CPT | Performed by: STUDENT IN AN ORGANIZED HEALTH CARE EDUCATION/TRAINING PROGRAM

## 2023-05-04 PROCEDURE — 85025 COMPLETE CBC W/AUTO DIFF WBC: CPT | Performed by: STUDENT IN AN ORGANIZED HEALTH CARE EDUCATION/TRAINING PROGRAM

## 2023-05-04 PROCEDURE — 25000003 PHARM REV CODE 250: Performed by: INTERNAL MEDICINE

## 2023-05-04 PROCEDURE — 11000001 HC ACUTE MED/SURG PRIVATE ROOM

## 2023-05-04 PROCEDURE — 25000003 PHARM REV CODE 250

## 2023-05-04 PROCEDURE — 86900 BLOOD TYPING SEROLOGIC ABO: CPT

## 2023-05-04 PROCEDURE — 94761 N-INVAS EAR/PLS OXIMETRY MLT: CPT

## 2023-05-04 PROCEDURE — 25000003 PHARM REV CODE 250: Performed by: STUDENT IN AN ORGANIZED HEALTH CARE EDUCATION/TRAINING PROGRAM

## 2023-05-04 PROCEDURE — 80053 COMPREHEN METABOLIC PANEL: CPT | Performed by: STUDENT IN AN ORGANIZED HEALTH CARE EDUCATION/TRAINING PROGRAM

## 2023-05-04 PROCEDURE — 63600175 PHARM REV CODE 636 W HCPCS

## 2023-05-04 PROCEDURE — 99232 SBSQ HOSP IP/OBS MODERATE 35: CPT | Mod: ,,, | Performed by: INTERNAL MEDICINE

## 2023-05-04 PROCEDURE — 83735 ASSAY OF MAGNESIUM: CPT | Performed by: STUDENT IN AN ORGANIZED HEALTH CARE EDUCATION/TRAINING PROGRAM

## 2023-05-04 PROCEDURE — 97530 THERAPEUTIC ACTIVITIES: CPT | Mod: CQ

## 2023-05-04 PROCEDURE — 63600175 PHARM REV CODE 636 W HCPCS: Performed by: STUDENT IN AN ORGANIZED HEALTH CARE EDUCATION/TRAINING PROGRAM

## 2023-05-04 PROCEDURE — 99232 PR SUBSEQUENT HOSPITAL CARE,LEVL II: ICD-10-PCS | Mod: ,,, | Performed by: INTERNAL MEDICINE

## 2023-05-04 RX ORDER — MAGNESIUM SULFATE HEPTAHYDRATE 40 MG/ML
2 INJECTION, SOLUTION INTRAVENOUS ONCE
Status: COMPLETED | OUTPATIENT
Start: 2023-05-04 | End: 2023-05-04

## 2023-05-04 RX ADMIN — OXYCODONE HYDROCHLORIDE 5 MG: 5 TABLET ORAL at 07:05

## 2023-05-04 RX ADMIN — DULOXETINE 30 MG: 30 CAPSULE, DELAYED RELEASE ORAL at 08:05

## 2023-05-04 RX ADMIN — MICAFUNGIN SODIUM 100 MG: 100 INJECTION, POWDER, LYOPHILIZED, FOR SOLUTION INTRAVENOUS at 09:05

## 2023-05-04 RX ADMIN — ATORVASTATIN CALCIUM 40 MG: 40 TABLET, FILM COATED ORAL at 08:05

## 2023-05-04 RX ADMIN — CARVEDILOL 25 MG: 25 TABLET, FILM COATED ORAL at 08:05

## 2023-05-04 RX ADMIN — Medication: at 09:05

## 2023-05-04 RX ADMIN — PANTOPRAZOLE SODIUM 40 MG: 40 TABLET, DELAYED RELEASE ORAL at 05:05

## 2023-05-04 RX ADMIN — ACETAMINOPHEN 1000 MG: 500 TABLET ORAL at 04:05

## 2023-05-04 RX ADMIN — MAGNESIUM SULFATE 2 G: 2 INJECTION INTRAVENOUS at 12:05

## 2023-05-04 RX ADMIN — PANTOPRAZOLE SODIUM 40 MG: 40 TABLET, DELAYED RELEASE ORAL at 04:05

## 2023-05-04 RX ADMIN — ACETAMINOPHEN 1000 MG: 500 TABLET ORAL at 08:05

## 2023-05-04 RX ADMIN — ACETAMINOPHEN 1000 MG: 500 TABLET ORAL at 05:05

## 2023-05-04 RX ADMIN — CARVEDILOL 25 MG: 25 TABLET, FILM COATED ORAL at 09:05

## 2023-05-04 RX ADMIN — PIPERACILLIN SODIUM AND TAZOBACTAM SODIUM 4.5 G: 4; .5 INJECTION, POWDER, FOR SOLUTION INTRAVENOUS at 04:05

## 2023-05-04 RX ADMIN — PIPERACILLIN SODIUM AND TAZOBACTAM SODIUM 4.5 G: 4; .5 INJECTION, POWDER, FOR SOLUTION INTRAVENOUS at 03:05

## 2023-05-04 RX ADMIN — CHOLECALCIFEROL TAB 25 MCG (1000 UNIT) 1000 UNITS: 25 TAB at 09:05

## 2023-05-04 RX ADMIN — DULOXETINE 30 MG: 30 CAPSULE, DELAYED RELEASE ORAL at 09:05

## 2023-05-04 RX ADMIN — AMLODIPINE BESYLATE 10 MG: 10 TABLET ORAL at 09:05

## 2023-05-04 RX ADMIN — COLLAGENASE SANTYL: 250 OINTMENT TOPICAL at 09:05

## 2023-05-04 RX ADMIN — LOPERAMIDE HYDROCHLORIDE 2 MG: 2 CAPSULE ORAL at 08:05

## 2023-05-04 NOTE — PROGRESS NOTES
[facility] Medicine  Progress Note    Patient Name: Nelsy Marino  MRN: [id number]  Patient Class: IP- Inpatient   Admission Date: 4/20/2023  Length of Stay: 12 days  Attending Physician: Kay Benavidez MD  Primary Care Provider: Juan F Garay        Subjective:     Principal Problem:LOLY (acute kidney injury)        HPI:  This is an 82-year-old female with a past medical history of severe PAD, hypertension, type 2 diabetes, HFpEF (EF 65%), L femoral bypass and L leg flap, R toe ampuations 2 months prior with presenting after  noted waxing/waning confusion since her discharge from hospital two weeks ago. History largely provided by  at bedside as patient is intermittently somnolent. She was discharged on IV Cefepime and Micafungin for her R foot amputation and patient reports adherence with regimen at home. He grew concerned that int he past two weeks she had worsening LLE pain and darkened skin, which is what happened before her R toes were amputated. His niece contacted a nurse who recommneded ED evaluation. No fevers, chills, HA, chest pain, SOB, N/V/D, abdominal pain.    Pt receives  nurse aide, PT/OT since hospital DC and ID very recently made a referral to podiatry and wound care, but appointments not yet made.     In the ED patient was hemodynamically stable and hypertensive, intermittently on RA to 2L NC. Leukocytosis to 13, Hgb 7.5, Cr 2.1, Albumin 2.1, BCx in process, CT head and CXR wnl. Started on BS Abx with continued Cefepime, [place], started on Vanc pending Bcx, US LE arterial with diminished flow throughout the left lower extremity. Monophasic waveforms suggesting atherosclerotic disease. Admitted to  for LOLY and Podiatry/Wound care.       Overview/Hospital Course:  Admitted to hospital medicine for LOLY and left foot pain. Cr on admission 2.1 (bl.8-1.5), improved with IVF initially. After holding IVF Cr increased, and then unresponsive to fluids. Patient with  significant urine retention, rivas placed and Cr again increased. Consulted Nephrology for assistance, suspect due to acute drop in Hgb. LOLY improved 4/27. Nephrology recommended heme/onc evaluation for this patient regarding the three monoclonal bands seen on SPE. Spoke with heme/onc regarding case, labs ordered in anticipation of outpatient evaluation given that patient stable. Patient having increased soft bowel movements with melena 4/25. GI consulted performed EGD with small bowel follow through 4/26 revealed red blood in the gastric body with three bleeding angioectasias in the stomach treated with APC. DAPT held in setting of acute bleed. Discussed concern of rebleeding AVM possibly due to DAPT with vascular. ASA trial for 1 day after patient with 2-3 days without melena resulted in 5 dark tarry BM. ASA d/c and GI reconsulted after Hgb from 8 to 6.1 overnight 5/1. EGD with push enteroscopy 5/2 revealed single bleeding angioectasia in the duodenum, treated with argon plasma coagulation (APC). Continued PO PPI BID.     Vascular surgery consulted on admission, recommended repeat ARCHIE and left foot arterial doppler. Podiatry also saw the patient for left foot wound, recommended wound vac placement. Both Vascular and Podiatry felt no further intervention necessary at this time.     ID consulted, patient was receiving cefepime and micafungin outpatient per ID following R foot transmetatarsal amputation. Started on vanc, cefepime, and micafungin. Patient more lethargic 4/22 although able to follow commands and move extremities on exam. With concern for cefepime toxicity, ID removed cefepime and vancomycin. Micafungin and zosyn continued. EEG and CTH ordered, no acute abnormalities. Patient significantly improved following d/c Cefepime to baseline. Patient to continue zosyn and micafungin until ID follow up 5/17 and then continue keflex ppx thereafter per ID.    SLP consulted recommended pureed dysphagia diet. PT/OT  recommended SNF placement, but insurance did not approve. Medically stable to discharge home with home health 5/4/23.       Interval History: Hgb again improved overnight with 1x small bm. Will set up HH with abx and wound vac    Review of Systems   Constitutional:  Negative for activity change, appetite change and fever.   HENT:  Negative for congestion and sore throat.    Respiratory:  Negative for cough, shortness of breath and wheezing.    Cardiovascular:  Negative for chest pain and leg swelling.   Gastrointestinal:  Negative for abdominal pain, constipation, diarrhea, nausea and vomiting.        Dark stools   Genitourinary:  Negative for difficulty urinating and dysuria.   Musculoskeletal:  Positive for arthralgias and myalgias.   Skin:  Positive for color change and wound. Negative for rash.   Allergic/Immunologic: Negative.    Neurological:  Negative for dizziness and headaches.   Hematological: Negative.    Objective:     Vital Signs (Most Recent):  Temp: 97.6 °F (36.4 °C) (05/04/23 1114)  Pulse: 78 (05/04/23 0756)  Resp: 18 (05/04/23 0756)  BP: (!) 159/69 (05/04/23 0756)  SpO2: 98 % (05/04/23 0756)   Vital Signs (24h Range):  Temp:  [97.3 °F (36.3 °C)-97.6 °F (36.4 °C)] 97.6 °F (36.4 °C)  Pulse:  [73-84] 78  Resp:  [12-27] 18  SpO2:  [92 %-99 %] 98 %  BP: (133-161)/(63-73) 159/69     Weight: 68 kg (149 lb 14.6 oz)  Body mass index is 24.95 kg/m².    Intake/Output Summary (Last 24 hours) at 5/4/2023 1533  Last data filed at 5/4/2023 0955  Gross per 24 hour   Intake --   Output 505 ml   Net -505 ml        Physical Exam  Vitals and nursing note reviewed.   Constitutional:       General: She is not in acute distress.     Appearance: She is not ill-appearing.   HENT:      Head: Normocephalic and atraumatic.      Mouth/Throat:      Mouth: Mucous membranes are moist.      Pharynx: Oropharynx is clear.   Eyes:      Extraocular Movements: Extraocular movements intact.      Conjunctiva/sclera: Conjunctivae normal.    Cardiovascular:      Rate and Rhythm: Normal rate and regular rhythm.      Pulses: Normal pulses.   Pulmonary:      Effort: Pulmonary effort is normal. No respiratory distress.      Breath sounds: No wheezing or rales.   Abdominal:      Palpations: Abdomen is soft.      Tenderness: There is no abdominal tenderness. There is no guarding.   Musculoskeletal:      Cervical back: Normal range of motion and neck supple.      Right lower leg: No edema.      Left lower leg: No edema.   Skin:     General: Skin is warm.      Findings: Lesion (R heel, L dorsum with darkened skin) present.      Comments: Insicion on right inner thigh- c/d/I- no drainage or erythema seen.  Wound on left thigh- with wound vac in place and drain in place with serosanguinous fluid.    Neurological:      General: No focal deficit present.      Mental Status: She is oriented to person, place, and time. Mental status is at baseline.       Significant Labs: All pertinent labs within the past 24 hours have been reviewed.  CBC:   Recent Labs   Lab 05/03/23  0335 05/03/23  1659 05/04/23  0843   WBC 8.37 8.67 9.22   HGB 8.8* 8.4* 8.9*   HCT 28.4* 26.7* 28.2*    258 292       CMP:   Recent Labs   Lab 05/03/23  0335 05/04/23  0843    143   K 3.8 3.8    110   CO2 22* 24    150*   BUN 25* 22   CREATININE 2.0* 1.9*   CALCIUM 8.9 9.0   PROT 7.1 7.5   ALBUMIN 2.1* 2.2*   BILITOT 0.4 0.3   ALKPHOS 52* 53*   AST 15 18   ALT 6* 6*   ANIONGAP 11 9         Significant Imaging: I have reviewed all pertinent imaging results/findings within the past 24 hours.      Assessment/Plan:      * LOLY (acute kidney injury)  Patient with acute kidney injury likely due to IVVD/dehydration LOLY is currently worsening. Labs reviewed- Renal function/electrolytes with Estimated Creatinine Clearance: 20.5 mL/min (A) (based on SCr of 1.9 mg/dL (H)). according to latest data. Monitor urine output and serial BMP and adjust therapy as needed. Avoid nephrotoxins and  renally dose meds for GFR listed above.     Cr 2.1 (baseline 0.8-1.5).  Patient dry on physical exam.  Poor oral intake, thought to be prerenal in origin initially. Possible due to acute on chronic anemia.    Recent Labs   Lab 05/03/23  0335 05/04/23  0843    143   K 3.8 3.8    110   CO2 22* 24   BUN 25* 22   CREATININE 2.0* 1.9*     - Nephrology consulted; appreciate assistance   - Nephrology f/u outpatient  - Will refer to heme/onc on discharge for further workup of three monoclonal bands seen on the SPE  - [place] placed after patient retaining on bladder scan 4/23 with hematuria 4/24  - Voiding trial 4/27 successful  - RP u/s no hydronephrosis  - Urine lytes showed possible intrinsic etiology  - CMP qd, trend Cr  - strict I/O  - daily weights  - renally dose all medications  - avoid nephrotoxic agents, NSAIDs, IV contrast, ACE/ARB      Debility  - PT/OT reconsulted  - Fall precautions  - Established with  PT/OT/nurse aide prior to hospitalization  -  assisting in SNF placement ; however insurance did not approve    Will d/c to home with      Skin ulcer of right heel, limited to breakdown of skin  See PVD for plan    - Wound care and podiatry consulted with appreciated mgmt recs      Acute on chronic anemia  Baseline 8.6-11.   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  GI consulted push enteroscopy 4/26 showed red blood in the gastric body and three bleeding angioectasias in the  stomach treated with APC  5/1 Drop in Hgb after several melanotic stools. GI re consulted; completed EGD with push enteroscopy 5/2/23, 1 new bleeding AVM treated with APC    - Continue PPI BID  - Maintain 2 large bore Ivs for volume resuscitation  - Trend CBC BID. Transfuse pRBC for Hb < 7 g/dL   - Stopped anticoagulation for now        Acute encephalopathy  A&O x3 on admission with waxing/waning per  at the bedside.  says she has been largely bed bound since discharge, but her waxing confusion was concerning to him. Patients mentation significantly improved after stopping cefepime.  Resolved; likely 2/2 cefepime neurotoxicity   - Repeat CT Head with no acute change  - EEG without seizures  - TSH, B12, B1, Vit E, Ammonia, RPR were unremarkable  - Cefepime changed to Zosyn  - F/u blood and Urine cultures      S/P transmetatarsal amputation of foot, right  Appreciated evaluation by Podiatry      Diastolic CHF, chronic  Patient is identified as having Diastolic (HFpEF) heart failure that is Chronic. CHF is currently controlled. Latest ECHO performed and demonstrates- Results for orders placed during the hospital encounter of 02/27/23    Echo    Interpretation Summary  · The estimated ejection fraction is 65%.  · The left ventricle is normal in size with normal systolic function.  · Grade II left ventricular diastolic dysfunction.  · Normal right ventricular size with normal right ventricular systolic function.  · Mild left atrial enlargement.  · Mild-to-moderate mitral regurgitation.  · There is pulmonary hypertension.  · The estimated PA systolic pressure is 55 mmHg.  · Intermediate central venous pressure (8 mmHg).  . Continue Beta Blocker and Furosemide and monitor clinical status closely. Monitor on telemetry. Patient is off CHF pathway.  Monitor strict Is&Os and daily weights.  Place on fluid restriction of 1.5 L. Continue to stress to patient importance of self efficacy and  on diet for CHF.  Last BNP reviewed- and noted below No results for input(s): BNP, BNPTRIAGEBLO in the last 168 hours..    - Holding home Lasix in setting of LOLY  - Holding Hyzaar (HCTZ) in setting of LOLY  - Patient breathing comfortably on RA, no signs of volume overload      Type 2 diabetes mellitus with chronic kidney disease, with long-term current use of insulin  Patient's FSGs are uncontrolled due to hyperglycemia on current medication regimen.  Last A1c reviewed-   Lab Results   Component Value Date    HGBA1C 8.2 (H) 02/28/2023     Most recent fingerstick glucose reviewed-   Recent Labs   Lab 05/03/23  1616 05/03/23 2014 05/04/23  0714 05/04/23  1113   POCTGLUCOSE 219* 177* 135* 155*     Current correctional scale  Low  Maintain anti-hyperglycemic dose as follows-   Antihyperglycemics (From admission, onward)    Start     Stop Route Frequency Ordered    04/21/23 0355  insulin aspart U-100 pen 0-5 Units         -- SubQ Before meals & nightly PRN 04/21/23 0255        Hold Oral hypoglycemics while patient is in the hospital.    Essential hypertension  - Continue home Coreg, amlodipine  - hold Hyzaar in setting of LOLY      PAD (peripheral artery disease)  Recently discharged home with IV Cefepime 2g Q12 hours + IV Micafungin 100mg Q24 hours for estimated end of therapy date of 5/16/2023. For R toe amputation. Patient's  reports adherence with IV Abx. L foot with darkening skin changes, which alarmed  as this was how skin presented prior to toe amputations of R foot. RLE warm to touch with L US Lower Extremity Arteries demonstrating diminished flow throughout the left lower extremity. Monophasic waveforms suggesting atherosclerotic disease.     - Podiatry and Vascular Surgery following; appreciate recs  - Restarted ASA 4/29, but discontinued after melena. Will not resume DAPT on d/c  - Continue Micafungin and Zosyn per prior ID recs until 5/17 to then continue Keflex ppx per ID thereafter for graft infection      VTE  Risk Mitigation (From admission, onward)         Ordered     IP VTE HIGH RISK PATIENT  Once         04/21/23 0254     Place sequential compression device  Until discontinued         04/21/23 0254                Discharge Planning   JUAN ALBERTO: 5/5/2023     Code Status: Full Code   Is the patient medically ready for discharge?: Yes    Reason for patient still in hospital (select all that apply): Pending disposition  Discharge Plan A: Home Health   Discharge Delays: None known at this time              Boris Mckeon DO  Department of Hospital Medicine   [facility]

## 2023-05-04 NOTE — PLAN OF CARE
[facility] - [facility]      HOME HEALTH ORDERS  FACE TO FACE ENCOUNTER    Patient Name: Nelsy Marino  YOB: 1940    PCP: Juan F Garay   PCP Address: [facility] MICHAELA HAYS / BURKE PRATHER  PCP Phone Number: [phone]  PCP Fax: [phone]    Encounter Date: 4/20/23    Admit to Home Health    Diagnoses:  Active Hospital Problems    Diagnosis  POA    *LOLY (acute kidney injury) [N17.9]  Yes    Moderate malnutrition [E44.0]  Unknown    Somnolence [R40.0]  Unknown    Vascular inflammation or infection [I77.6]  Yes    Wound infection [T14.8XXA, L08.9]  Yes    Skin ulcer of right heel, limited to breakdown of skin [L97.411]  Yes    Debility [R53.81]  Unknown    Acute on chronic anemia [D64.9]  Yes    Eschar of foot [R23.4]  Yes    Melena [K92.1]  Yes    Acute encephalopathy [G93.40]  Yes    S/P transmetatarsal amputation of foot, right [Z89.431]  Not Applicable     Chronic    Essential hypertension [I10]  Yes     Chronic    PAD (peripheral artery disease) [I73.9]  Yes     Chronic     Patient did not tolerate aspirin or plavix due to GI bleeding        Type 2 diabetes mellitus with chronic kidney disease, 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       Follow Up Appointments:  Future Appointments   Date Time Provider Department Center   5/16/2023  9:30 AM Danny Dunbar MD [facility]   5/17/2023  8:00 AM Rivas Rojas NP [address]   5/17/2023  1:30 PM [facility]   6/8/2023  1:30 PM Shani Andrade MD [facility]       Allergies:  Review of patient's allergies indicates:   Allergen Reactions    Motrin [ibuprofen] Itching       Medications: Review discharge medications with patient and family and provide education.    Current Facility-Administered Medications   Medication Dose  Route Frequency Provider Last Rate Last Admin    0.9%  NaCl infusion (for blood administration)   Intravenous Q24H PRN Boris Mckeon,         acetaminophen tablet 1,000 mg  1,000 mg Oral Q8H Boris Mckeon, DO   1,000 mg at 05/05/23 0652    amLODIPine tablet 10 mg  10 mg Oral Daily Boris Mckeon DO   10 mg at 05/05/23 0926    ammonium lactate 12 % lotion   Topical (Top) Daily Lalo Pollock MD   Given at 05/05/23 0900    atorvastatin tablet 40 mg  40 mg Oral QHS Scott Cash MD   40 mg at 05/04/23 2047    carvediloL tablet 25 mg  25 mg Oral BID Geneva M Cubides, DO   25 mg at 05/05/23 0926    collagenase ointment   Topical (Top) Daily Shadi Vidal MD   Given at 05/05/23 0900    dextrose 10% bolus 125 mL 125 mL  12.5 g Intravenous PRN Scott Cash MD        dextrose 10% bolus 250 mL 250 mL  25 g Intravenous PRN Scott Cash MD        dextrose 40 % gel 15,000 mg  15 g Oral PRN Scott Cash MD        dextrose 40 % gel 30,000 mg  30 g Oral PRN Scott Cash MD        DULoxetine DR capsule 30 mg  30 mg Oral BID Scott Cash MD   30 mg at 05/05/23 0926    glucagon (human recombinant) injection 1 mg  1 mg Intramuscular PRN Scott Cash MD        insulin aspart U-100 pen 0-5 Units  0-5 Units Subcutaneous QID (AC + HS) PRN Scott Cash MD   2 Units at 05/03/23 1210    loperamide capsule 2 mg  2 mg Oral QID PRN Danette Abarca MD   2 mg at 05/04/23 2046    micafungin 100 mg in sodium chloride 0.9 % 100 mL IVPB (MB+)  100 mg Intravenous Q24H Boris Mckeon  mL/hr at 05/05/23 0940 100 mg at 05/05/23 0940    naloxone 0.4 mg/mL injection 0.02 mg  0.02 mg Intravenous PRN Scott Cash MD        ondansetron injection 4 mg  4 mg Intravenous Q6H PRN Sheyla Jaramillo DO   4 mg at 04/23/23 1723    oxyCODONE immediate release tablet 5 mg  5 mg Oral Q4H PRN Kay Benavidez MD   5 mg at 05/05/23 0858    pantoprazole EC tablet 40 mg  40 mg Oral BID AC Boris Mckeon DO   40 mg at 05/05/23 0679     piperacillin-tazobactam (ZOSYN) 4.5 g in dextrose 5 % in water (D5W) 5 % 100 mL IVPB (MB+)  4.5 g Intravenous Q8H Kay Benavidez MD        sodium chloride 0.9% flush 10 mL  10 mL Intravenous Q12H PRN Scott Cash MD        vitamin D 1000 units tablet 1,000 Units  1,000 Units Oral Daily Geneva Turk, DO   1,000 Units at 05/05/23 0926    vits A and D-white pet-lanolin ointment   Topical (Top) PRN Shadi Vidal MD   Given at 05/04/23 0936     Current Discharge Medication List        START taking these medications    Details   acetaminophen (TYLENOL) 500 MG tablet Take 2 tablets (1,000 mg total) by mouth every 8 (eight) hours.  Refills: 0      amLODIPine (NORVASC) 10 MG tablet Take 1 tablet (10 mg total) by mouth once daily.  Qty: 90 tablet, Refills: 0    Comments: .      dextrose 5 % in water (D5W) 5 % PgBk 100 mL with piperacillin-tazobactam 4.5 gram SolR 4.5 g Inject 4.5 g into the vein every 8 (eight) hours.      sodium chloride 0.9 % PgBk 100 mL with micafungin 100 mg SolR 100 mg Inject 100 mg into the vein once daily. for 14 days      vitamin D (VITAMIN D3) 1000 units Tab Take 1 tablet (1,000 Units total) by mouth once daily.           CONTINUE these medications which have CHANGED    Details   carvediloL (COREG) 25 MG tablet Take 1 tablet (25 mg total) by mouth 2 (two) times daily.  Qty: 60 tablet, Refills: 11    Comments: .      insulin detemir U-100, Levemir, 100 unit/mL (3 mL) SubQ InPn pen Inject 5 Units into the skin every evening.  Qty: 6 mL, Refills: 3      oxyCODONE (ROXICODONE) 5 MG immediate release tablet Take 1 tablet (5 mg total) by mouth every 12 (twelve) hours as needed.  Qty: 14 tablet, Refills: 0    Comments: Quantity prescribed more than 7 day supply? No      pantoprazole (PROTONIX) 40 MG tablet Take 1 tablet (40 mg total) by mouth once daily.  Qty: 90 tablet, Refills: 0           CONTINUE these medications which have NOT CHANGED    Details   atorvastatin (LIPITOR) 40 MG tablet Take 1  "tablet (40 mg total) by mouth every evening.  Qty: 30 tablet, Refills: 0      diphenhydrAMINE (BENADRYL) 25 mg capsule Take 25 mg by mouth daily as needed for Itching.      DULoxetine (CYMBALTA) 30 MG capsule Take 1 capsule (30 mg total) by mouth 2 (two) times daily.  Qty: 60 capsule, Refills: 0      BLOOD SUGAR DIAGNOSTIC (TRUE METRIX GLUCOSE TEST STRIP MISC) by Misc.(Non-Drug; Combo Route) route.      insulin syringe-needle,dispos. 0.3 mL 30 gauge x 5/16" Syrg by Misc.(Non-Drug; Combo Route) route.      pen needle, diabetic 32 gauge x 5/32" Ndle Inject 1 each into the skin 4 (four) times daily.  Qty: 100 each, Refills: 0           STOP taking these medications       acetaminophen (TYLENOL) 650 MG TbSR Comments:   Reason for Stopping:         cephALEXin (KEFLEX) 500 MG capsule Comments:   Reason for Stopping:         clopidogreL (PLAVIX) 75 mg tablet Comments:   Reason for Stopping:         furosemide (LASIX) 20 MG tablet Comments:   Reason for Stopping:         HYDROcodone-acetaminophen (NORCO) 5-325 mg per tablet Comments:   Reason for Stopping:         insulin aspart U-100 (NOVOLOG) 100 unit/mL injection Comments:   Reason for Stopping:         losartan-hydrochlorothiazide 100-25 mg (HYZAAR) 100-25 mg per tablet Comments:   Reason for Stopping:         aspirin (ECOTRIN) 81 MG EC tablet Comments:   Reason for Stopping:         benzonatate (TESSALON) 100 MG capsule Comments:   Reason for Stopping:         insulin aspart protamine-insulin aspart (NOVOLOG MIX 70-30FLEXPEN U-100) 100 unit/mL (70-30) InPn pen Comments:   Reason for Stopping:                 I have seen and examined this patient within the last 30 days. My clinical findings that support the need for the home health skilled services and home bound status are the following:no   Weakness/numbness causing balance and gait disturbance due to Infection and Weakness/Debility making it taxing to leave home.       Diet: diabetic diet: 2000 " calorie    Activities:   Activity as tolerated    Goals of Care Treatment Preferences:  Code Status: Full Code      Labs:  CBC, CMP, CRP weekly on Mondays    Fax Lab Results to Infectious Diseases Provider: Noel Presley     [facility] Fax Number: [phone]                 Referrals/ Consults  Physical Therapy to evaluate and treat. Evaluate for home safety and equipment needs; Establish/upgrade home exercise program. Perform / instruct on therapeutic exercises, gait training, transfer training, and Range of Motion.  Occupational Therapy to evaluate and treat. Evaluate home environment for safety and equipment needs. Perform/Instruct on transfers, ADL training, ROM, and therapeutic exercises.  Aide to provide assistance with personal care, ADLs, and vital signs.    Activities:   activity as tolerated    Nursing:   Agency to admit patient within 24 hours of hospital discharge unless specified on physician order or at patient request    SN to complete comprehensive assessment including routine vital signs. Instruct on disease process and s/s of complications to report to MD. Review/verify medication list sent home with the patient at time of discharge  and instruct patient/caregiver as needed. Frequency may be adjusted depending on start of care date.     Skilled nurse to perform up to 3 visits PRN for symptoms related to diagnosis    Notify MD if SBP > 160 or < 90; DBP > 90 or < 50; HR > 120 or < 50; Temp > 101; O2 < 88%;     Ok to schedule additional visits based on staff availability and patient request on consecutive days within the home health episode.    When multiple disciplines ordered:    Start of Care occurs on Sunday - Wednesday schedule remaining discipline evaluations as ordered on separate consecutive days following the start of care.    Thursday SOC -schedule subsequent evaluations Friday and Monday the following week.     Friday - Saturday SOC - schedule subsequent discipline evaluations on  consecutive days starting Monday of the following week.    For all post-discharge communication and subsequent orders please contact patient's primary care physician.       Miscellaneous Care:               Outpatient Infectious Diseases Follow-up     Follow-up appointment made for 5/17/23 with Dr. Hopper.    Antimicrobial administration:   Administer (drug and dose):       -Zosyn 4.5 g  IV q 8  hours      Last dose given: 12:45 PM 5/1/2023                  Next dose due: 21:00  5/2/2023  STOP DATE: 5/17/2023     - Micafungin 100 IV q 24 hours     Last dose given: 10:00 AM 5/5/2023                     Next  dose due:10:00 AM 5/6/3023  STOP DATE: 5/17/2023       Scrub the Hub: Prior to accessing the line, always perform a 30 second alcohol scrub  Each lumen of the central line is to be flushed at least daily with 10 mL Normal Saline and 3 mL Heparin flush (10 units/mL)  Skilled Nurse (SN) may draw blood from IV access  Blood Draw Procedure:              - Aspirate at least 5 mL of blood              - Discard              - Obtain specimen              - Change injection cap              - Flush with 20 mL Normal Saline followed by a                 3-5 mL Heparin flush (10 units/mL)  Central :              - Sterile dressing changes are done weekly and as needed.              - Use chlor-hexadine scrub to cleanse site, apply Biopatch to insertion site,                 apply securement device dressing              - Injection caps are changed weekly and after EVERY lab draw.              - If sterile gauze is under dressing to control oozing,                 dressing change must be performed every 24 hours until gauze is not needed.       Miscellaneous   Routine Skin for Bedridden Patients: Instruct patient/caregiver to apply moisture barrier cream to all skin folds and wet areas in perineal area daily and after baths and all bowel movements.    Home Health Aide:  Nursing Three times weekly, Physical  Therapy Three times weekly, Occupational Therapy Three times weekly, and Home Health Aide Three times weekly    Wound Care Orders  Wound Care: yes:  Vascular Wound:  Location: Right thigh    Consult ET nurse          Wound Vac:   Location:  Left thigh           ET Consult    BLE WBAT in surgical shoes. Daily betadine paint to ischemic, dark, necrotic areas of both feet.    Cleanse Left groin wound with wound cleanser  Pat dry.  Apply Aquacel Ag to wound bed  Cover with foam dressing to secure and absorb additional drainage.       I certify that this patient is confined to her home and needs intermittent skilled nursing care, physical therapy, and occupational therapy.

## 2023-05-04 NOTE — PLAN OF CARE
[facility]  Discharge Reassessment    Primary Care Provider: Juan F Garay    Expected Discharge Date: 5/5/2023    Reassessment (most recent)       Discharge Reassessment - 05/04/23 1501          Discharge Reassessment    Assessment Type Discharge Planning Reassessment     Did the patient's condition or plan change since previous assessment? No     Discharge Plan discussed with: Spouse/sig other;Patient;Sibling     Communicated JUAN ALBERTO with patient/caregiver Yes     Discharge Plan A Home Health     Discharge Plan B Skilled Nursing Facility     DME Needed Upon Discharge  none     Discharge Barriers Identified None     Why the patient remains in the hospital Requires continued medical care        Post-Acute Status    Post-Acute Authorization Placement;IV Infusion;Home Health     Post-Acute Placement Status Referrals Sent     Home Health Status Referrals Sent     IV Infusion Status Referral(s) sent     Hospital Resources/Appts/Education Provided Post-Acute resouces added to AVS;Appointments scheduled and added to AVS     Discharge Delays None known at this time

## 2023-05-04 NOTE — PLAN OF CARE
05/04/23 1500   Post-Acute Status   Post-Acute Authorization Home Health;Placement;IV Infusion   Post-Acute Placement Status Referrals Sent   Home Health Status Referrals Sent   IV Infusion Status Referral(s) sent   Hospital Resources/Appts/Education Provided Post-Acute resouces added to AVS;Appointments scheduled and added to AVS   Discharge Delays None known at this time   Discharge Plan   Discharge Plan A Home Health   Discharge Plan B Skilled Nursing Facility

## 2023-05-04 NOTE — PLAN OF CARE
Wound vac form faxed to [facility] to . Will follow. Pt is current with [facility] and [facility].

## 2023-05-04 NOTE — ASSESSMENT & PLAN NOTE
Patient's FSGs are uncontrolled due to hyperglycemia on current medication regimen.  Last A1c reviewed-   Lab Results   Component Value Date    HGBA1C 8.2 (H) 02/28/2023     Most recent fingerstick glucose reviewed-   Recent Labs   Lab 05/03/23  1616 05/03/23 2014 05/04/23  0714 05/04/23  1113   POCTGLUCOSE 219* 177* 135* 155*     Current correctional scale  Low  Maintain anti-hyperglycemic dose as follows-   Antihyperglycemics (From admission, onward)    Start     Stop Route Frequency Ordered    04/21/23 0355  insulin aspart U-100 pen 0-5 Units         -- SubQ Before meals & nightly PRN 04/21/23 0255        Hold Oral hypoglycemics while patient is in the hospital.

## 2023-05-04 NOTE — PT/OT/SLP PROGRESS
Physical Therapy Treatment    Patient Name:  Nelsy Marino   MRN:  [id number]    Recommendations:     Discharge Recommendations: nursing facility, skilled  Discharge Equipment Recommendations: none  Barriers to discharge: Inaccessible home    Assessment:     Nelsy Marino is a 82 y.o. female admitted with a medical diagnosis of LOLY (acute kidney injury).  She presents with the following impairments/functional limitations: weakness, impaired endurance, impaired functional mobility, gait instability, decreased coordination.  Pt was agreeable to therapy with encouragement. Session limited d/t pt needing to be cleaned and change brief twice. Pt tolerated short lateral stepping today. Pt continues to benefit from therapy to improve functional endurance, strength, and mobility.     Rehab Prognosis: Good; patient would benefit from acute skilled PT services to address these deficits and reach maximum level of function.    Recent Surgery: Procedure(s) (LRB):  EGD (ESOPHAGOGASTRODUODENOSCOPY) (N/A) 2 Days Post-Op    Plan:     During this hospitalization, patient to be seen 3 x/week to address the identified rehab impairments via therapeutic activities, therapeutic exercises, neuromuscular re-education and progress toward the following goals:    Plan of Care Expires:  05/20/23    Subjective     Chief Complaint: stomach ache  Patient/Family Comments/goals: pt's family reports possible discharging soon.  Pain/Comfort:  Pain Rating 1:  (did not rate)  Location - Orientation 1: generalized  Location 1: abdomen  Pain Addressed 1: Reposition  Pain Rating Post-Intervention 1:  (did not rate)      Objective:     Communicated with nurse prior to session.  Patient found right sidelying with wound vac, peripheral IV, pressure relief boots, WILTON drain upon PT entry to room.     General Precautions: Standard, fall  Orthopedic Precautions:  (BLE WBAT in surgical shoes)  Braces:  (Darco Shoes)  Respiratory Status: Room air    Functional  Mobility:  Bed Mobility:   Rolling/Turning to Left/Right: stand by assistance  Multiple trials.   Scooting to EOB: stand by assistance  Supine to Sit: contact guard assistance; HOB elevated  Increase time to complete   Sit to Supine: minimum assistance  Assisted with BLE  Transfers:   Sit <> Stand Transfer: minimum assistance with rolling walker   Cues for hand placement and scooting towards edge of bed  Gait:  Pt took 4 lateral steps with moderate assistance and rolling walker.  no rest break and no LOB  No buckling noted today  Gait Deviation(s): occasional unsteady gait with flexed posture, decrease weight shift, decrease sabine  Verbal/tactile cues for RW management and weight shifting    AM-PAC 6 CLICK MOBILITY  Turning over in bed (including adjusting bedclothes, sheets and blankets)?: 4  Sitting down on and standing up from a chair with arms (e.g., wheelchair, bedside commode, etc.): 3  Moving from lying on back to sitting on the side of the bed?: 3  Moving to and from a bed to a chair (including a wheelchair)?: 2  Need to walk in hospital room?: 2  Climbing 3-5 steps with a railing?: 1  Basic Mobility Total Score: 15     Treatment & Education:  Increase time assisting with cleaning pt and changing brief twice  Assisted with donning DARCO shoes   Patient educated on role of therapy, goals of session, and benefits of out of bed mobility.   Instructed on use of call button and importance of calling nursing staff for assistance with mobility   Questions/concerns addressed within PTA scope of practice  Pt verbalized understanding.    Patient left HOB elevated with all lines intact, call button in reach, and family present. Pressure relief boots donned at end of session.     GOALS:   Multidisciplinary Problems       Physical Therapy Goals          Problem: Physical Therapy    Goal Priority Disciplines Outcome Goal Variances Interventions   Physical Therapy Goal     PT, PT/OT Ongoing, Progressing     Description:  Goals to be met by: 5/10/23     Patient will increase functional independence with mobility by performin. Supine to sit with Moderate Assistance  2. Sit to stand transfer with Moderate Assistance  3. Bed to chair transfer with Moderate Assistance using AD if needed and Darco shoe  4. Sitting at edge of bed x10 minutes with Minimal Assistance to perform functional mobility.                          Time Tracking:     PT Received On: 23  PT Start Time: 1155     PT Stop Time: 1225  PT Total Time (min): 30 min     Billable Minutes: Therapeutic Activity 30    Treatment Type: Treatment  PT/PTA: PTA     Number of PTA visits since last PT visit: 3     2023

## 2023-05-04 NOTE — ASSESSMENT & PLAN NOTE
Assessment:   -Right foot s/p TMA with eschar at TMA site, also with partial thickness breakdown of heel. Not clinically infected. X ray unremarkable.  -Left foot with recent blister and ischemic/early gangrenous changes. Not clinically infected. X ray unremarkable.  -Left foot alteration in skin integrity over heel. No clinical signs of infection.   -Severe PAD with B ARCHIE 2.01, prior revascularizations, no further intervention planned by vascular surgery.    Plan:  -No surgery planned this admission   -Left heel bulla pierced and cultured  -Appreciate ID recs, feet do not appear infected.  -Appreciate vascular recs, no further revascularization at this time.  -Daily betadine paint to eschars and dark areas of both feet, daily Amlactin to dry scaling areas of both feet.   -Offload bilateral heels in Z flex heel protector boots at all times while in bed or chair.   -BLE WBAT in surgical shoes for transfers or short distances.   -Podiatry will follow.   DC Instructions: Please order ambulatory referral to podiatry (Short). BLE WBAT in surgical shoes. Daily betadine paint to ischemic, dark, necrotic areas of both feet.

## 2023-05-04 NOTE — SUBJECTIVE & OBJECTIVE
Interval History: Hgb again improved overnight with 1x small bm. Will set up HH with abx and wound vac    Review of Systems   Constitutional:  Negative for activity change, appetite change and fever.   HENT:  Negative for congestion and sore throat.    Respiratory:  Negative for cough, shortness of breath and wheezing.    Cardiovascular:  Negative for chest pain and leg swelling.   Gastrointestinal:  Negative for abdominal pain, constipation, diarrhea, nausea and vomiting.        Dark stools   Genitourinary:  Negative for difficulty urinating and dysuria.   Musculoskeletal:  Positive for arthralgias and myalgias.   Skin:  Positive for color change and wound. Negative for rash.   Allergic/Immunologic: Negative.    Neurological:  Negative for dizziness and headaches.   Hematological: Negative.    Objective:     Vital Signs (Most Recent):  Temp: 97.6 °F (36.4 °C) (05/04/23 1114)  Pulse: 78 (05/04/23 0756)  Resp: 18 (05/04/23 0756)  BP: (!) 159/69 (05/04/23 0756)  SpO2: 98 % (05/04/23 0756)   Vital Signs (24h Range):  Temp:  [97.3 °F (36.3 °C)-97.6 °F (36.4 °C)] 97.6 °F (36.4 °C)  Pulse:  [73-84] 78  Resp:  [12-27] 18  SpO2:  [92 %-99 %] 98 %  BP: (133-161)/(63-73) 159/69     Weight: 68 kg (149 lb 14.6 oz)  Body mass index is 24.95 kg/m².    Intake/Output Summary (Last 24 hours) at 5/4/2023 1533  Last data filed at 5/4/2023 0955  Gross per 24 hour   Intake --   Output 505 ml   Net -505 ml        Physical Exam  Vitals and nursing note reviewed.   Constitutional:       General: She is not in acute distress.     Appearance: She is not ill-appearing.   HENT:      Head: Normocephalic and atraumatic.      Mouth/Throat:      Mouth: Mucous membranes are moist.      Pharynx: Oropharynx is clear.   Eyes:      Extraocular Movements: Extraocular movements intact.      Conjunctiva/sclera: Conjunctivae normal.   Cardiovascular:      Rate and Rhythm: Normal rate and regular rhythm.      Pulses: Normal pulses.   Pulmonary:      Effort:  Pulmonary effort is normal. No respiratory distress.      Breath sounds: No wheezing or rales.   Abdominal:      Palpations: Abdomen is soft.      Tenderness: There is no abdominal tenderness. There is no guarding.   Musculoskeletal:      Cervical back: Normal range of motion and neck supple.      Right lower leg: No edema.      Left lower leg: No edema.   Skin:     General: Skin is warm.      Findings: Lesion (R heel, L dorsum with darkened skin) present.      Comments: Insicion on right inner thigh- c/d/I- no drainage or erythema seen.  Wound on left thigh- with wound vac in place and drain in place with serosanguinous fluid.    Neurological:      General: No focal deficit present.      Mental Status: She is oriented to person, place, and time. Mental status is at baseline.       Significant Labs: All pertinent labs within the past 24 hours have been reviewed.  CBC:   Recent Labs   Lab 05/03/23  0335 05/03/23  1659 05/04/23  0843   WBC 8.37 8.67 9.22   HGB 8.8* 8.4* 8.9*   HCT 28.4* 26.7* 28.2*    258 292       CMP:   Recent Labs   Lab 05/03/23  0335 05/04/23  0843    143   K 3.8 3.8    110   CO2 22* 24    150*   BUN 25* 22   CREATININE 2.0* 1.9*   CALCIUM 8.9 9.0   PROT 7.1 7.5   ALBUMIN 2.1* 2.2*   BILITOT 0.4 0.3   ALKPHOS 52* 53*   AST 15 18   ALT 6* 6*   ANIONGAP 11 9         Significant Imaging: I have reviewed all pertinent imaging results/findings within the past 24 hours.

## 2023-05-04 NOTE — PROGRESS NOTES
[facility] Surg  Podiatry  Progress Note    Patient Name: Nelsy Marino  MRN: [id number]  Admission Date: 4/20/2023  Hospital Length of Stay: 11 days  Attending Physician: Shadi Vidal MD  Primary Care Provider: Juan F Garay     Subjective:     Interval History: Hypertensive, other vitals stable. No new pedal complaints. Dressings clean dry, intact.     Follow-up For: Procedure(s) (LRB):  EGD (ESOPHAGOGASTRODUODENOSCOPY) (N/A)    Post-Operative Day: 1 Day Post-Op    Scheduled Meds:   acetaminophen  1,000 mg Oral Q8H    amLODIPine  10 mg Oral Daily    ammonium lactate   Topical (Top) Daily    atorvastatin  40 mg Oral QHS    carvediloL  25 mg Oral BID    collagenase   Topical (Top) Daily    DULoxetine  30 mg Oral BID    micafungin (MYCAMINE) IVPB  100 mg Intravenous Q24H    pantoprazole  40 mg Oral BID AC    piperacillin-tazobactam (ZOSYN) IVPB  4.5 g Intravenous Q12H    vitamin D  1,000 Units Oral Daily     Continuous Infusions:  PRN Meds:sodium chloride, dextrose 10%, dextrose 10%, dextrose, dextrose, glucagon (human recombinant), insulin aspart U-100, loperamide, naloxone, ondansetron, oxyCODONE, sodium chloride 0.9%, vits A and D-white pet-lanolin    Review of Systems   Constitutional:  Negative for activity change, appetite change and fever.   HENT:  Negative for congestion and sore throat.    Respiratory:  Negative for cough, shortness of breath and wheezing.    Cardiovascular:  Negative for chest pain and leg swelling.   Gastrointestinal:  Negative for abdominal pain, constipation, diarrhea, nausea and vomiting.        Dark stools   Genitourinary:  Negative for difficulty urinating and dysuria.   Musculoskeletal:  Positive for arthralgias and myalgias.   Skin:  Positive for color change and wound. Negative for rash.   Allergic/Immunologic: Negative.    Neurological:  Negative for dizziness and headaches.   Hematological: Negative.    Objective:     Vital Signs (Most Recent):  Temp: 97.5 °F  (36.4 °C) (05/03/23 1618)  Pulse: 83 (05/03/23 1618)  Resp: 18 (05/03/23 1618)  BP: (!) 161/72 (05/03/23 1618)  SpO2: 99 % (05/03/23 1618)   Vital Signs (24h Range):  Temp:  [97.5 °F (36.4 °C)-98.2 °F (36.8 °C)] 97.5 °F (36.4 °C)  Pulse:  [77-95] 83  Resp:  [11-22] 18  SpO2:  [95 %-99 %] 99 %  BP: (157-167)/(58-72) 161/72     Weight: 68 kg (149 lb 14.6 oz)  Body mass index is 24.95 kg/m².    Foot Exam    General  Orientation: alert and oriented to person, place, and time   Affect: appropriate       Right Foot/Ankle     Inspection and Palpation  Tenderness: (TMA site, mild)  Swelling: none   Skin Exam: abnormal color and ulcer (TMA incision site); no drainage (resolved) and no maceration (resolved)     Neurovascular  Dorsalis pedis: doppler  Posterior tibial: doppler  Saphenous nerve sensation: diminished  Tibial nerve sensation: diminished  Superficial peroneal nerve sensation: diminished  Deep peroneal nerve sensation: diminished  Sural nerve sensation: diminished    Comments  -Dry stable eschar across TMA site, no drainage, moderately tender.  -Heel with partial thickness skin breakdown.     Left Foot/Ankle      Inspection and Palpation  Tenderness: (rest pain LLE)  Swelling: none   Skin Exam: blister;     Neurovascular  Dorsalis pedis: absent  Posterior tibial: doppler  Saphenous nerve sensation: diminished  Tibial nerve sensation: diminished  Superficial peroneal nerve sensation: diminished  Deep peroneal nerve sensation: diminished  Sural nerve sensation: diminished    Comments  -Dorsal foot blister previously ruptured, no fluctuance or drainage, no open wound. Darkness surrounding blister and extending to bases of toes.   -Heel with partial thickness skin breakdown with overlying blister/bulla, pierced, fluid cultured, left intact.     Laboratory:  CBC:   Recent Labs   Lab 05/03/23  1659   WBC 8.67   RBC 3.15*   HGB 8.4*   HCT 26.7*      MCV 85   MCH 26.7*   MCHC 31.5*     CMP:   Recent Labs   Lab  05/03/23  0335      CALCIUM 8.9   ALBUMIN 2.1*   PROT 7.1      K 3.8   CO2 22*      BUN 25*   CREATININE 2.0*   ALKPHOS 52*   ALT 6*   AST 15   BILITOT 0.4     Wound Cultures:   Recent Labs   Lab 03/04/23  1141 03/10/23  1340 03/31/23  0805 03/31/23  0809   LABAERO No growth PSEUDOMONAS FLUORESCENS GROUP  Few  * FELI ALBICANS  Rare  * FELI GLABRATA  Rare  *     Microbiology Results (last 7 days)       Procedure Component Value Units Date/Time    Gram stain [[id number]] Collected: 05/03/23 1047    Order Status: Completed Specimen: Wound from Foot, Left Updated: 05/03/23 1832     Gram Stain Result Few WBC's      No organisms seen    Narrative:      Left heel wound    Culture, Anaerobe [[id number]] Collected: 05/03/23 1047    Order Status: Sent Specimen: Wound from Foot, Left Updated: 05/03/23 1217    Aerobic culture [[id number]] Collected: 05/03/23 1047    Order Status: Sent Specimen: Wound from Foot, Left Updated: 05/03/23 1217          Specimen (24h ago, onward)      None          All pertinent labs reviewed within the last 24 hours.    Diagnostic Results:  I have reviewed all pertinent imaging results/findings within the past 24 hours.    Clinical Findings:  New alteration in skin integrity of the left heel and left distal foot worsening of ischemic /early gangrenous changes                Assessment/Plan:     Derm  Eschar of foot  Assessment:   -Right foot s/p TMA with eschar at TMA site, also with partial thickness breakdown of heel. Not clinically infected. X ray unremarkable.  -Left foot with recent blister and ischemic/early gangrenous changes. Not clinically infected. X ray unremarkable.  -Left foot alteration in skin integrity over heel. No clinical signs of infection.   -Severe PAD with B ARCHIE 2.01, prior revascularizations, no further intervention planned by vascular surgery.    Plan:  -No surgery planned this admission   -Left heel bulla pierced and cultured  -Appreciate ID recs, feet  do not appear infected.  -Appreciate vascular recs, no further revascularization at this time.  -Daily betadine paint to eschars and dark areas of both feet, daily Amlactin to dry scaling areas of both feet.   -Offload bilateral heels in Z flex heel protector boots at all times while in bed or chair.   -BLE WBAT in surgical shoes for transfers or short distances.   -Podiatry will follow.   DC Instructions: Please order ambulatory referral to podiatry (Short). BLE WBAT in surgical shoes. Daily betadine paint to ischemic, dark, necrotic areas of both feet.         Dick Goyal DPM, PGY-2  Podiatry / Foot and Ankle Surgery   Page # [phone]  Secure chat preferred

## 2023-05-04 NOTE — ASSESSMENT & PLAN NOTE
Patient with acute kidney injury likely due to IVVD/dehydration LOLY is currently worsening. Labs reviewed- Renal function/electrolytes with Estimated Creatinine Clearance: 20.5 mL/min (A) (based on SCr of 1.9 mg/dL (H)). according to latest data. Monitor urine output and serial BMP and adjust therapy as needed. Avoid nephrotoxins and renally dose meds for GFR listed above.     Cr 2.1 (baseline 0.8-1.5).  Patient dry on physical exam.  Poor oral intake, thought to be prerenal in origin initially. Possible due to acute on chronic anemia.    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

## 2023-05-05 VITALS
HEIGHT: 65 IN | WEIGHT: 149.94 LBS | BODY MASS INDEX: 24.98 KG/M2 | HEART RATE: 74 BPM | OXYGEN SATURATION: 98 % | SYSTOLIC BLOOD PRESSURE: 119 MMHG | RESPIRATION RATE: 18 BRPM | DIASTOLIC BLOOD PRESSURE: 78 MMHG | TEMPERATURE: 97 F

## 2023-05-05 LAB
ALBUMIN SERPL BCP-MCNC: 2.2 G/DL (ref 3.5–5.2)
ALP SERPL-CCNC: 52 U/L (ref 55–135)
ALT SERPL W/O P-5'-P-CCNC: <5 U/L (ref 10–44)
ANION GAP SERPL CALC-SCNC: 11 MMOL/L (ref 8–16)
AST SERPL-CCNC: 15 U/L (ref 10–40)
BASOPHILS # BLD AUTO: 0.02 K/UL (ref 0–0.2)
BASOPHILS # BLD AUTO: 0.02 K/UL (ref 0–0.2)
BASOPHILS NFR BLD: 0.2 % (ref 0–1.9)
BASOPHILS NFR BLD: 0.2 % (ref 0–1.9)
BILIRUB SERPL-MCNC: 0.4 MG/DL (ref 0.1–1)
BUN SERPL-MCNC: 19 MG/DL (ref 8–23)
CALCIUM SERPL-MCNC: 8.9 MG/DL (ref 8.7–10.5)
CHLORIDE SERPL-SCNC: 110 MMOL/L (ref 95–110)
CO2 SERPL-SCNC: 22 MMOL/L (ref 23–29)
CREAT SERPL-MCNC: 1.7 MG/DL (ref 0.5–1.4)
DIFFERENTIAL METHOD: ABNORMAL
DIFFERENTIAL METHOD: ABNORMAL
EOSINOPHIL # BLD AUTO: 0.6 K/UL (ref 0–0.5)
EOSINOPHIL # BLD AUTO: 0.7 K/UL (ref 0–0.5)
EOSINOPHIL NFR BLD: 5.7 % (ref 0–8)
EOSINOPHIL NFR BLD: 7.5 % (ref 0–8)
ERYTHROCYTE [DISTWIDTH] IN BLOOD BY AUTOMATED COUNT: 14.6 % (ref 11.5–14.5)
ERYTHROCYTE [DISTWIDTH] IN BLOOD BY AUTOMATED COUNT: 14.9 % (ref 11.5–14.5)
EST. GFR  (NO RACE VARIABLE): 29.8 ML/MIN/1.73 M^2
GLUCOSE SERPL-MCNC: 114 MG/DL (ref 70–110)
HCT VFR BLD AUTO: 27.7 % (ref 37–48.5)
HCT VFR BLD AUTO: 28.7 % (ref 37–48.5)
HGB BLD-MCNC: 8.7 G/DL (ref 12–16)
HGB BLD-MCNC: 8.7 G/DL (ref 12–16)
IMM GRANULOCYTES # BLD AUTO: 0.02 K/UL (ref 0–0.04)
IMM GRANULOCYTES # BLD AUTO: 0.28 K/UL (ref 0–0.04)
IMM GRANULOCYTES NFR BLD AUTO: 0.2 % (ref 0–0.5)
IMM GRANULOCYTES NFR BLD AUTO: 2.9 % (ref 0–0.5)
LYMPHOCYTES # BLD AUTO: 1.6 K/UL (ref 1–4.8)
LYMPHOCYTES # BLD AUTO: 1.6 K/UL (ref 1–4.8)
LYMPHOCYTES NFR BLD: 16.1 % (ref 18–48)
LYMPHOCYTES NFR BLD: 17.8 % (ref 18–48)
MAGNESIUM SERPL-MCNC: 1.9 MG/DL (ref 1.6–2.6)
MCH RBC QN AUTO: 26.9 PG (ref 27–31)
MCH RBC QN AUTO: 27.4 PG (ref 27–31)
MCHC RBC AUTO-ENTMCNC: 30.3 G/DL (ref 32–36)
MCHC RBC AUTO-ENTMCNC: 31.4 G/DL (ref 32–36)
MCV RBC AUTO: 87 FL (ref 82–98)
MCV RBC AUTO: 89 FL (ref 82–98)
MONOCYTES # BLD AUTO: 0.4 K/UL (ref 0.3–1)
MONOCYTES # BLD AUTO: 0.4 K/UL (ref 0.3–1)
MONOCYTES NFR BLD: 4.4 % (ref 4–15)
MONOCYTES NFR BLD: 4.8 % (ref 4–15)
NEUTROPHILS # BLD AUTO: 6.1 K/UL (ref 1.8–7.7)
NEUTROPHILS # BLD AUTO: 6.9 K/UL (ref 1.8–7.7)
NEUTROPHILS NFR BLD: 69.5 % (ref 38–73)
NEUTROPHILS NFR BLD: 70.7 % (ref 38–73)
NRBC BLD-RTO: 0 /100 WBC
NRBC BLD-RTO: 0 /100 WBC
PLATELET # BLD AUTO: 294 K/UL (ref 150–450)
PLATELET # BLD AUTO: 301 K/UL (ref 150–450)
PMV BLD AUTO: 10.4 FL (ref 9.2–12.9)
PMV BLD AUTO: 10.7 FL (ref 9.2–12.9)
POCT GLUCOSE: 120 MG/DL (ref 70–110)
POCT GLUCOSE: 141 MG/DL (ref 70–110)
POCT GLUCOSE: 227 MG/DL (ref 70–110)
POTASSIUM SERPL-SCNC: 3.4 MMOL/L (ref 3.5–5.1)
PROT SERPL-MCNC: 7.1 G/DL (ref 6–8.4)
RBC # BLD AUTO: 3.18 M/UL (ref 4–5.4)
RBC # BLD AUTO: 3.24 M/UL (ref 4–5.4)
SODIUM SERPL-SCNC: 143 MMOL/L (ref 136–145)
WBC # BLD AUTO: 8.71 K/UL (ref 3.9–12.7)
WBC # BLD AUTO: 9.73 K/UL (ref 3.9–12.7)

## 2023-05-05 PROCEDURE — 83735 ASSAY OF MAGNESIUM: CPT | Performed by: STUDENT IN AN ORGANIZED HEALTH CARE EDUCATION/TRAINING PROGRAM

## 2023-05-05 PROCEDURE — 63600175 PHARM REV CODE 636 W HCPCS

## 2023-05-05 PROCEDURE — 63600175 PHARM REV CODE 636 W HCPCS: Performed by: INTERNAL MEDICINE

## 2023-05-05 PROCEDURE — 36415 COLL VENOUS BLD VENIPUNCTURE: CPT | Performed by: STUDENT IN AN ORGANIZED HEALTH CARE EDUCATION/TRAINING PROGRAM

## 2023-05-05 PROCEDURE — 97112 NEUROMUSCULAR REEDUCATION: CPT | Mod: CQ

## 2023-05-05 PROCEDURE — 99239 HOSP IP/OBS DSCHRG MGMT >30: CPT | Mod: ,,, | Performed by: INTERNAL MEDICINE

## 2023-05-05 PROCEDURE — 25000003 PHARM REV CODE 250: Performed by: STUDENT IN AN ORGANIZED HEALTH CARE EDUCATION/TRAINING PROGRAM

## 2023-05-05 PROCEDURE — 25000003 PHARM REV CODE 250

## 2023-05-05 PROCEDURE — 85025 COMPLETE CBC W/AUTO DIFF WBC: CPT | Performed by: STUDENT IN AN ORGANIZED HEALTH CARE EDUCATION/TRAINING PROGRAM

## 2023-05-05 PROCEDURE — 99239 PR HOSPITAL DISCHARGE DAY,>30 MIN: ICD-10-PCS | Mod: ,,, | Performed by: INTERNAL MEDICINE

## 2023-05-05 PROCEDURE — 25000003 PHARM REV CODE 250: Performed by: INTERNAL MEDICINE

## 2023-05-05 PROCEDURE — 1111F PR DISCHARGE MEDS RECONCILED W/ CURRENT OUTPATIENT MED LIST: ICD-10-PCS | Mod: CPTII,,, | Performed by: INTERNAL MEDICINE

## 2023-05-05 PROCEDURE — 80053 COMPREHEN METABOLIC PANEL: CPT | Performed by: STUDENT IN AN ORGANIZED HEALTH CARE EDUCATION/TRAINING PROGRAM

## 2023-05-05 PROCEDURE — 1111F DSCHRG MED/CURRENT MED MERGE: CPT | Mod: CPTII,,, | Performed by: INTERNAL MEDICINE

## 2023-05-05 PROCEDURE — 97530 THERAPEUTIC ACTIVITIES: CPT | Mod: CQ

## 2023-05-05 RX ORDER — POTASSIUM CHLORIDE 750 MG/1
30 CAPSULE, EXTENDED RELEASE ORAL ONCE
Status: COMPLETED | OUTPATIENT
Start: 2023-05-05 | End: 2023-05-05

## 2023-05-05 RX ORDER — OXYCODONE HYDROCHLORIDE 5 MG/1
5 TABLET ORAL EVERY 12 HOURS PRN
Qty: 14 TABLET | Refills: 0 | Status: ON HOLD | OUTPATIENT
Start: 2023-05-05 | End: 2023-07-18 | Stop reason: HOSPADM

## 2023-05-05 RX ADMIN — DULOXETINE 30 MG: 30 CAPSULE, DELAYED RELEASE ORAL at 09:05

## 2023-05-05 RX ADMIN — Medication: at 09:05

## 2023-05-05 RX ADMIN — ACETAMINOPHEN 1000 MG: 500 TABLET ORAL at 06:05

## 2023-05-05 RX ADMIN — INSULIN ASPART 2 UNITS: 100 INJECTION, SOLUTION INTRAVENOUS; SUBCUTANEOUS at 12:05

## 2023-05-05 RX ADMIN — PANTOPRAZOLE SODIUM 40 MG: 40 TABLET, DELAYED RELEASE ORAL at 04:05

## 2023-05-05 RX ADMIN — OXYCODONE HYDROCHLORIDE 5 MG: 5 TABLET ORAL at 08:05

## 2023-05-05 RX ADMIN — COLLAGENASE SANTYL: 250 OINTMENT TOPICAL at 09:05

## 2023-05-05 RX ADMIN — OXYCODONE HYDROCHLORIDE 5 MG: 5 TABLET ORAL at 01:05

## 2023-05-05 RX ADMIN — AMLODIPINE BESYLATE 10 MG: 10 TABLET ORAL at 09:05

## 2023-05-05 RX ADMIN — POTASSIUM CHLORIDE 30 MEQ: 10 CAPSULE, COATED, EXTENDED RELEASE ORAL at 09:05

## 2023-05-05 RX ADMIN — PIPERACILLIN AND TAZOBACTAM 4.5 G: 4; .5 INJECTION, POWDER, LYOPHILIZED, FOR SOLUTION INTRAVENOUS; PARENTERAL at 12:05

## 2023-05-05 RX ADMIN — PIPERACILLIN SODIUM AND TAZOBACTAM SODIUM 4.5 G: 4; .5 INJECTION, POWDER, FOR SOLUTION INTRAVENOUS at 04:05

## 2023-05-05 RX ADMIN — CARVEDILOL 25 MG: 25 TABLET, FILM COATED ORAL at 09:05

## 2023-05-05 RX ADMIN — MICAFUNGIN SODIUM 100 MG: 100 INJECTION, POWDER, LYOPHILIZED, FOR SOLUTION INTRAVENOUS at 09:05

## 2023-05-05 RX ADMIN — CHOLECALCIFEROL TAB 25 MCG (1000 UNIT) 1000 UNITS: 25 TAB at 09:05

## 2023-05-05 RX ADMIN — PANTOPRAZOLE SODIUM 40 MG: 40 TABLET, DELAYED RELEASE ORAL at 06:05

## 2023-05-05 NOTE — PLAN OF CARE
Cammy spoke with [facility], on Pt's wound vac form 2 cm depth is documented but wound care notes only state .03cm. For insurance to cover, wound must be .05cm depth. Cammy updated IM team, [facility] contact is . Cammy updated Pt and sister, dc may be later this pm or in the am.

## 2023-05-05 NOTE — PLAN OF CARE
[facility]    Spoke with Surya( ) [phone] to inform him of new antibiotic pip-tazo 13 GM IV q 24 hours continuous elastomeric infusion. Instructed to connect when patient gets home and reconnect to a new med ball at the same time the next day. Reinforced to read the labels. Patient is agreeable to plan. Instructed to throw away all medication at home because its . Surya aware patient continues on micafungin. Delivery of IV antibiotics will be made to home this evening.     [facility]  Jose M Gold Rn, Clinical Liaison Manager  Cell [phone]  Office [phone]  Fax [phone]

## 2023-05-05 NOTE — PT/OT/SLP PROGRESS
"Physical Therapy Treatment    Patient Name:  Nelsy Marino   MRN:  [id number]    Recommendations:     Discharge Recommendations: nursing facility, skilled  Discharge Equipment Recommendations: none  Barriers to discharge: Inaccessible home    Assessment:     Nelsy Marino is a 82 y.o. female admitted with a medical diagnosis of LOLY (acute kidney injury).  She presents with the following impairments/functional limitations: weakness, impaired endurance, impaired functional mobility, decreased coordination, gait instability. Pt required min A with bed mobility and STS using RW 2/2 weakness with DARCO shoes donned. Pt had no c/o dizziness or lightheadedness. Pt will cont to benefit from skilled acute PT to improve mobility, strength, and endurance to safely perform functional activities.     Rehab Prognosis: Good; patient would benefit from acute skilled PT services to address these deficits and reach maximum level of function.    Recent Surgery: Procedure(s) (LRB):  EGD (ESOPHAGOGASTRODUODENOSCOPY) (N/A) 3 Days Post-Op    Plan:     During this hospitalization, patient to be seen 3 x/week to address the identified rehab impairments via therapeutic activities, therapeutic exercises, neuromuscular re-education and progress toward the following goals:    Plan of Care Expires:  05/20/23    Subjective     Chief Complaint: None  Patient/Family Comments/goals: "Thank you"  Pain/Comfort:  Pain Rating 1: 6/10  Location - Side 1: Left  Location - Orientation 1: generalized  Location 1: leg  Pain Addressed 1: Reposition, Pre-medicate for activity      Objective:     Communicated with nurse prior to session.  Patient found HOB elevated with wound vac, peripheral IV, pressure relief boots, WILTON drain upon PT entry to room.     General Precautions: Standard, fall  Orthopedic Precautions: N/A  Braces: N/A  Respiratory Status: Room air     Functional Mobility:  Bed Mobility:     Rolling Left:  minimum assistance  Scooting: minimum " assistance  Bridging: stand by assistance  Supine to Sit: minimum assistance  Sit to Supine: contact guard assistance  Transfers:     Sit to Stand:  contact guard assistance and minimum assistance with rolling walker  Balance: Good dynamic sitting balance @ SBA      AM-PAC 6 CLICK MOBILITY  Turning over in bed (including adjusting bedclothes, sheets and blankets)?: 3  Sitting down on and standing up from a chair with arms (e.g., wheelchair, bedside commode, etc.): 3  Moving from lying on back to sitting on the side of the bed?: 3  Moving to and from a bed to a chair (including a wheelchair)?: 2  Need to walk in hospital room?: 2  Climbing 3-5 steps with a railing?: 1  Basic Mobility Total Score: 14       Treatment & Education:  Therapeutic exercises: LAQ's and hip flexion X 15 reps AROM  Pt tolerated STS with RW for 3 trials  Pt sat @ EOB ~8 mins   Pt was educated on progress towards PT goals    Patient left HOB elevated with all lines intact, call button in reach, bed alarm on, nurse notified, and sister-in-law present..    GOALS:   Multidisciplinary Problems       Physical Therapy Goals          Problem: Physical Therapy    Goal Priority Disciplines Outcome Goal Variances Interventions   Physical Therapy Goal     PT, PT/OT Ongoing, Progressing     Description: Goals to be met by: 5/10/23     Patient will increase functional independence with mobility by performin. Supine to sit with Moderate Assistance  2. Sit to stand transfer with Moderate Assistance  3. Bed to chair transfer with Moderate Assistance using AD if needed and Darco shoe  4. Sitting at edge of bed x10 minutes with Minimal Assistance to perform functional mobility.                          Time Tracking:     PT Received On: 23  PT Start Time: 1357     PT Stop Time: 1423  PT Total Time (min): 26 min     Billable Minutes: Therapeutic Activity 18 and Neuromuscular Re-education 8    Treatment Type: Treatment  PT/PTA: PTA     Number of PTA  visits since last PT visit: 4 05/05/2023

## 2023-05-05 NOTE — DISCHARGE SUMMARY
[facility]  Discharge Summary      Patient Name: Nelsy Marino  MRN: [id number]  NICK: [id number]  Patient Class: IP- Inpatient  Admission Date: 4/20/2023  Hospital Length of Stay: 13 days  Discharge Date and Time:  05/05/2023 4:39 PM  Attending Physician: Kay Benavidez MD   Discharging Provider: Geneva Turk DO  Primary Care Provider: [facility] Medicine Team: [facility] 4 Geneva Turk DO  Primary Care Team: [facility] 4    HPI:   This is an 82-year-old female with a past medical history of severe PAD, hypertension, type 2 diabetes, HFpEF (EF 65%), L femoral bypass and L leg flap, R toe ampuations 2 months prior with presenting after  noted waxing/waning confusion since her discharge from hospital two weeks ago. History largely provided by  at bedside as patient is intermittently somnolent. She was discharged on IV Cefepime and Micafungin for her R foot amputation and patient reports adherence with regimen at home. He grew concerned that int he past two weeks she had worsening LLE pain and darkened skin, which is what happened before her R toes were amputated. His niece contacted a nurse who recommneded ED evaluation. No fevers, chills, HA, chest pain, SOB, N/V/D, abdominal pain.    Pt receives  nurse aide, PT/OT since hospital DC and ID very recently made a referral to podiatry and wound care, but appointments not yet made.     In the ED patient was hemodynamically stable and hypertensive, intermittently on RA to 2L NC. Leukocytosis to 13, Hgb 7.5, Cr 2.1, Albumin 2.1, BCx in process, CT head and CXR wnl. Started on BS Abx with continued Cefepime, [place], started on Vanc pending Bcx, US LE arterial with diminished flow throughout the left lower extremity. Monophasic waveforms suggesting atherosclerotic disease. Admitted to  for LOLY and Podiatry/Wound care.       Procedure(s) (LRB):  EGD (ESOPHAGOGASTRODUODENOSCOPY) (N/A)      Hospital  Course:   Admitted to hospital medicine for LOLY and left foot pain. Cr on admission 2.1 (bl.8-1.5), improved with IVF initially. After holding IVF Cr increased, and then unresponsive to fluids. Patient with significant urine retention, rivas placed and Cr again increased. Consulted Nephrology for assistance, suspect due to acute drop in Hgb. LOLY improved 4/27. Nephrology recommended heme/onc evaluation for this patient regarding the three monoclonal bands seen on SPE. Spoke with heme/onc regarding case, labs ordered in anticipation of outpatient evaluation given that patient stable. Patient having increased soft bowel movements with melena 4/25. GI consulted performed EGD with small bowel follow through 4/26 revealed red blood in the gastric body with three bleeding angioectasias in the stomach treated with APC. DAPT held in setting of acute bleed. Discussed concern of rebleeding AVM possibly due to DAPT with vascular. ASA trial for 1 day after patient with 2-3 days without melena resulted in 5 dark tarry BM. ASA d/c and GI reconsulted after Hgb from 8 to 6.1 overnight 5/1. EGD with push enteroscopy 5/2 revealed single bleeding angioectasia in the duodenum, treated with argon plasma coagulation (APC). Continued PO PPI daily.     Vascular surgery consulted on admission, recommended repeat ARCHIE and left foot arterial doppler. Podiatry also saw the patient for left foot wound, recommended wound vac placement. Both Vascular and Podiatry felt no further intervention necessary at this time. Insurance did not cover wound vac, it was changed to regular dressings. Patient to have wound care with home health.     ID consulted, patient was receiving cefepime and micafungin outpatient per ID following R foot transmetatarsal amputation. Started on vanc, cefepime, and micafungin. Patient more lethargic 4/22 although able to follow commands and move extremities on exam. With concern for cefepime toxicity, ID removed cefepime and  vancomycin. Micafungin and zosyn continued. EEG and CTH ordered, no acute abnormalities. Patient significantly improved following d/c Cefepime to baseline. Patient to continue zosyn and micafungin until ID follow up 5/17 and then continue keflex ppx thereafter per ID.    SLP consulted recommended pureed dysphagia diet. PT/OT recommended SNF placement, but insurance did not approve. Medically stable to discharge home with home health 5/5/23.        Goals of Care Treatment Preferences:  Code Status: Full Code      Consults:   Consults (From admission, onward)        Status Ordering Provider     Inpatient consult to Gastroenterology  Once        Provider:  (Not yet assigned)    Completed JANA ALCAZAR     Inpatient consult to Nephrology  Once        Provider:  (Not yet assigned)    Completed WESLEY GARCIA     Inpatient consult to Registered Dietitian/Nutritionist  Once        Provider:  (Not yet assigned)    Completed CARLOS MANUEL ORELLANA     Inpatient consult to Infectious Diseases  Once        Provider:  (Not yet assigned)    Completed TEJINDER SIMPSON     Inpatient consult to Vascular Surgery  Once        Provider:  (Not yet assigned)    Completed TEJINDER SIMPSON     IP consult to case management  Once        Provider:  (Not yet assigned)    Completed CARLOS MANUEL ORELLANA     Inpatient consult to Podiatry  Once        Provider:  (Not yet assigned)    Completed MICHELLE ROJAS          No new Assessment & Plan notes have been filed under this hospital service since the last note was generated.  Service: Hospital Medicine    Final Active Diagnoses:    Diagnosis Date Noted POA    PRINCIPAL PROBLEM:  LOLY (acute kidney injury) [N17.9] 04/21/2023 Yes    Moderate malnutrition [E44.0] 04/28/2023 Unknown    Somnolence [R40.0] 04/25/2023 Unknown    Vascular inflammation or infection [I77.6] 04/22/2023 Yes    Wound infection [T14.8XXA, L08.9] 04/22/2023 Yes    Skin ulcer of right heel, limited to breakdown of skin [L97.411] 04/21/2023  Yes    Debility [R53.81] 04/21/2023 Unknown    Acute on chronic anemia [D64.9] 04/08/2023 Yes    Eschar of foot [R23.4] 03/30/2023 Yes    Melena [K92.1] 03/17/2023 Yes    Acute encephalopathy [G93.40] 03/13/2023 Yes    S/P transmetatarsal amputation of foot, right [Z89.431] 03/08/2023 Not Applicable     Chronic    Essential hypertension [I10] 03/20/2019 Yes     Chronic    PAD (peripheral artery disease) [I73.9] 03/20/2019 Yes     Chronic    Type 2 diabetes mellitus with chronic kidney disease, with long-term current use of insulin [E11.22, Z79.4] 03/20/2019 Not Applicable     Chronic    Diastolic CHF, chronic [I50.32] 03/20/2019 Yes     Chronic      Problems Resolved During this Admission:       Discharged Condition: stable    Disposition: Home or Self Care    Follow Up:   Follow-up Information     [facility] Follow up on 5/11/2023.    Why: Pt has a 115pm appt  Contact information:  [address]  [place] [address]  [phone]                       Patient Instructions:      Ambulatory referral/consult to Hematology / Oncology   Standing Status: Future   Referral Priority: Routine Referral Type: Consultation   Referral Reason: Specialty Services Required   Requested Specialty: Hematology and Oncology   Number of Visits Requested: 1     Ambulatory referral/consult to [facility]   Standing Status: Future   Referral Priority: Routine Referral Type: Consultation   Referral Reason: Specialty Services Required   Number of Visits Requested: 1     Ambulatory referral/consult to Cardiology   Standing Status: Future   Referral Priority: Routine Referral Type: Consultation   Referral Reason: Specialty Services Required   Requested Specialty: Cardiology   Number of Visits Requested: 1     Ambulatory referral/consult to Plastic Surgery   Standing Status: Future   Referral Priority: Routine Referral Type: Consultation   Referral Reason: Specialty Services Required   Requested  Specialty: Plastic Surgery   Number of Visits Requested: 1     Ambulatory referral/consult to Gastroenterology   Standing Status: Future   Referral Priority: Routine Referral Type: Consultation   Referral Reason: Specialty Services Required   Requested Specialty: Gastroenterology   Number of Visits Requested: 1     Ambulatory referral/consult to Nephrology   Standing Status: Future   Referral Priority: Routine Referral Type: Consultation   Referral Reason: Specialty Services Required   Requested Specialty: Nephrology   Number of Visits Requested: 1     Diet diabetic     Notify your health care provider if you experience any of the following:  increased confusion or weakness     Notify your health care provider if you experience any of the following:  persistent dizziness, light-headedness, or visual disturbances     Notify your health care provider if you experience any of the following:  worsening rash     Notify your health care provider if you experience any of the following:  severe persistent headache     Notify your health care provider if you experience any of the following:  difficulty breathing or increased cough     Notify your health care provider if you experience any of the following:  redness, tenderness, or signs of infection (pain, swelling, redness, odor or green/yellow discharge around incision site)     Notify your health care provider if you experience any of the following:  severe uncontrolled pain     Notify your health care provider if you experience any of the following:  persistent nausea and vomiting or diarrhea     Notify your health care provider if you experience any of the following:  temperature >100.4     Activity as tolerated       Significant Diagnostic Studies: Labs:   CMP   Recent Labs   Lab 05/04/23  0843 05/05/23  0514    143   K 3.8 3.4*    110   CO2 24 22*   * 114*   BUN 22 19   CREATININE 1.9* 1.7*   CALCIUM 9.0 8.9   PROT 7.5 7.1   ALBUMIN 2.2* 2.2*    BILITOT 0.3 0.4   ALKPHOS 53* 52*   AST 18 15   ALT 6* <5*   ANIONGAP 9 11    and CBC   Recent Labs   Lab 05/04/23  0843 05/04/23  1627 05/05/23  0513   WBC 9.22 9.31 8.71   HGB 8.9* 9.2* 8.7*   HCT 28.2* 29.2* 28.7*    319 294     Radiology: Ultrasound: VAS US Arterial Legs Bilateral   Indication   ========     Peripheral Vascular Disease     Bypass Graft Evaluation   ==================     Location:  Lt to Rt Fem-Fem Crossover   Lt inflow PSV  96 cm/s   Lt prox anastomosis PSV    116 cm/s   Lt prox. anastomosis over lt inflow PSV ratio of graft 1.2   Lt prox graft PSV  65 cm/s   Lt prox. graft over lt prox. anastomosis PSV ratio of graft    0.6   Lt mid graft PSV   64 cm/s   Lt mid. graft over left prox. graft PSV ratio of graft 1.0   Lt distal graft PSV    65 cm/s   Lt distal graft over lt mid. graft PSV ratio of graft  1.0   Lt distal anastomosis PSV  55 cm/s   Lt distal anastomosis over lt distal graft PSV ratio of graft  0.8   Lt outflow PSV 40 cm/s   Lt outflow over lt distal anastomosis PSV ratio of graft   0.7     Comment   ========     Technically challenging study due to pt laying on side and unable to straighten legs.     Impression   =========     Duplex imaging reveals a patent Lt. to Rt. Fem-Fem bypass graft. Graft velocities do not suggest a hemodynamically significant   stenosis.     Pending Diagnostic Studies:     Procedure Component Value Units Date/Time    CBC with Automated Differential [[id number]] Collected: 05/05/23 1617    Order Status: Sent Lab Status: In process Updated: 05/05/23 1617    Specimen: Blood     CBC with Automated Differential [[id number]] Collected: 04/22/23 0400    Order Status: Sent Lab Status: In process Updated: 04/22/23 1007    Specimen: Blood     Comprehensive Metabolic Panel (CMP) [[id number]] Collected: 04/22/23 0400    Order Status: Sent Lab Status: In process Updated: 04/22/23 1007    Specimen: Blood     Magnesium [[id number]] Collected: 04/22/23 0400    Order  "Status: Sent Lab Status: In process Updated: 04/22/23 1007    Specimen: Blood     Phosphorus [[id number]] Collected: 04/22/23 0400    Order Status: Sent Lab Status: In process Updated: 04/22/23 1007    Specimen: Blood          Medications:  Reconciled Home Medications:      Medication List      START taking these medications    acetaminophen 500 MG tablet  Commonly known as: TYLENOL  Take 2 tablets (1,000 mg total) by mouth every 8 (eight) hours.  Replaces: acetaminophen 650 MG Tbsr     amLODIPine 10 MG tablet  Commonly known as: NORVASC  Take 1 tablet (10 mg total) by mouth once daily.     dextrose 5 % in water (D5W) 5 % PgBk 100 mL with piperacillin-tazobactam 4.5 gram SolR 4.5 g  Inject 4.5 g into the vein every 8 (eight) hours.     sodium chloride 0.9 % PgBk 100 mL with micafungin 100 mg SolR 100 mg  Inject 100 mg into the vein once daily. for 14 days     vitamin D 1000 units Tab  Commonly known as: VITAMIN D3  Take 1 tablet (1,000 Units total) by mouth once daily.        CHANGE how you take these medications    carvediloL 25 MG tablet  Commonly known as: COREG  Take 1 tablet (25 mg total) by mouth 2 (two) times daily.  What changed:   · medication strength  · how much to take     insulin detemir U-100 (Levemir) 100 unit/mL (3 mL) Inpn pen  Inject 5 Units into the skin every evening.  What changed: how much to take     oxyCODONE 5 MG immediate release tablet  Commonly known as: ROXICODONE  Take 1 tablet (5 mg total) by mouth every 12 (twelve) hours as needed.  What changed:   · when to take this  · reasons to take this     pantoprazole 40 MG tablet  Commonly known as: PROTONIX  Take 1 tablet (40 mg total) by mouth once daily.  What changed: when to take this        CONTINUE taking these medications    atorvastatin 40 MG tablet  Commonly known as: LIPITOR  Take 1 tablet (40 mg total) by mouth every evening.     BD SHERINE 2ND GEN PEN NEEDLE 32 gauge x 5/32" Ndle  Generic drug: pen needle, diabetic  Inject 1 each into " "the skin 4 (four) times daily.     BenadryL 25 mg capsule  Generic drug: diphenhydrAMINE  Take 25 mg by mouth daily as needed for Itching.     DULoxetine 30 MG capsule  Commonly known as: CYMBALTA  Take 1 capsule (30 mg total) by mouth 2 (two) times daily.     insulin syringe-needle,dispos. 0.3 mL 30 gauge x 5/16" Syrg  by Misc.(Non-Drug; Combo Route) route.     TRUE METRIX GLUCOSE TEST STRIP MISC  by Misc.(Non-Drug; Combo Route) route.        STOP taking these medications    acetaminophen 650 MG Tbsr  Commonly known as: TYLENOL  Replaced by: acetaminophen 500 MG tablet     aspirin 81 MG EC tablet  Commonly known as: ECOTRIN     benzonatate 100 MG capsule  Commonly known as: TESSALON     cephALEXin 500 MG capsule  Commonly known as: KEFLEX     clopidogreL 75 mg tablet  Commonly known as: PLAVIX     furosemide 20 MG tablet  Commonly known as: LASIX     HYDROcodone-acetaminophen 5-325 mg per tablet  Commonly known as: NORCO     insulin aspart protamine-insulin aspart 100 unit/mL (70-30) Inpn pen  Commonly known as: NovoLOG Mix 70-30FlexPen U-100     insulin aspart U-100 100 unit/mL injection  Commonly known as: NovoLOG     losartan-hydrochlorothiazide 100-25 mg 100-25 mg per tablet  Commonly known as: HYZAAR            Indwelling Lines/Drains at time of discharge:   Lines/Drains/Airways     Peripherally Inserted Central Catheter Line  Duration           PICC Double Lumen 04/05/23 1107 right basilic 30 days          Drain  Duration                Closed/Suction Drain 03/31/23 0953 Left;Anterior;Lateral Thigh Bulb 15 Fr. 35 days                Time spent on the discharge of patient: 45 minutes         Geneva Turk DO  Department of Hospital Medicine  [facility] - Med Surg  "

## 2023-05-05 NOTE — PROGRESS NOTES
Pharmacist Renal Dose Adjustment Note    Nelsy Marino is a 82 y.o. female being treated with the medication piperacillin-tazobactam    Patient Data:    Vital Signs (Most Recent):  Temp: 98.5 °F (36.9 °C) (05/05/23 0538)  Pulse: 76 (05/05/23 0538)  Resp: 18 (05/05/23 0538)  BP: (!) 150/66 (05/05/23 0538)  SpO2: 98 % (05/05/23 0538)   Vital Signs (72h Range):  Temp:  [96.8 °F (36 °C)-98.9 °F (37.2 °C)]   Pulse:  [67-95]   Resp:  [11-27]   BP: (133-175)/()   SpO2:  [92 %-100 %]      Recent Labs   Lab 05/03/23  0335 05/04/23  0843 05/05/23 0514   CREATININE 2.0* 1.9* 1.7*     Serum creatinine: 1.7 mg/dL (H) 05/05/23 0514  Estimated creatinine clearance: 23 mL/min (A)    Pip-tazo 4.5g IV q12h was changed to q8h for CrCl >20 ml/min    Cielo George, PharmD, BCPS  [phone]

## 2023-05-05 NOTE — PLAN OF CARE
Orders sent to [facility]. Jose M with [facility] is working up Pt, Sw to follow with wound vac delivery to the room. Sw spoke with PRUDENCIOI/MICHAEL on Pt's vac status, Sw informed Kimberleeshane still has the order in process and asked to call in the pm for release time, Sw to follow.

## 2023-05-05 NOTE — PLAN OF CARE
Recommendations     1. Continue 2000 Alex Diabetic diet, add renal restrictions if warranted.   2. Add Dylon BID x 14 day. Rcommend Vit C + MVI + zinc supplementation to promote wound healing.   3. Recommend ONS renal friendly Novasource Renal BID for additional calories/protein. If LOLY improves recommend Boost Glucose Control TID.   4. If no improvement in appetite/PO intake appetite stimulant may beneficial.   5. RD to monitor and follow-up.     Goals: Meet % of EEN/EPN by RD f/u date  Nutrition Goal Status: progressing towards goal  Communication of RD Recs: other (comment) (POC)

## 2023-05-05 NOTE — PROGRESS NOTES
DC education given to both pt and daughter regarding wet-to-dry dressing change, opioid use, WILTON drain care, and when to seek medical attention. Pt and daughter verbalized understanding. Extra dressing change supplies sent with pt and daughter. Belongings sent with patient.       Pt initially supposed to d/c home via ambulance scheduled for 1730. Ambulance transport did not arrive. Pt's daughter voiced frustration after 30 minutes of ambulance not showing up on time, wanting to take the pt home herself. Pt to safely d/c home with daughter.

## 2023-05-05 NOTE — PLAN OF CARE
Pt to dc with wet to dry wound changes, Sw setup transport via stretcher for 530pm. HH updated as well as [facility].

## 2023-05-05 NOTE — PROGRESS NOTES
[facility] - Med Surg  Adult Nutrition  Progress Note    SUMMARY       Recommendations    1. Continue 2000 Alex Diabetic diet, add renal restrictions if warranted.   2. Add Dylon BID x 14 day. Rcommend Vit C + MVI + zinc supplementation to promote wound healing.   3. Recommend ONS renal friendly Novasource Renal BID for additional calories/protein. If LOLY improves recommend Boost Glucose Control TID.   4. If no improvement in appetite/PO intake appetite stimulant may beneficial.   5. RD to monitor and follow-up.    Goals: Meet % of EEN/EPN by RD f/u date  Nutrition Goal Status: progressing towards goal  Communication of RD Recs: other (comment) (POC)    Assessment and Plan    Endocrine  Moderate malnutrition    Nutrition Problem:  Moderate Protein-Calorie Malnutrition  Malnutrition in the context of Acute Illness/Injury    Related to (etiology):  Inability to consume sufficient energy     Signs and Symptoms (as evidenced by):  Body Fat Depletion: mild and moderate depletion of orbitals   Muscle Mass Depletion: mild and moderate depletion of temples and clavicle region   Weight Loss: 10% x 6 months    Interventions(treatment strategy):  Collaboration of nutrition care w/ other providers  ONS  Wound Healing    Nutrition Diagnosis Status:  New           Malnutrition Assessment  Malnutrition Context: acute illness or injury  Malnutrition Level: moderate          Weight Loss (Malnutrition): 10% in 6 months  Subcutaneous Fat (Malnutrition): other (see comments) (Mild-moderate)  Muscle Mass (Malnutrition): other (see comments) (Mild-moderate)                         Reason for Assessment    Reason For Assessment: RD follow-up  Diagnosis: other (see comments)  Relevant Medical History: DM, HTN  Interdisciplinary Rounds: did not attend  General Information Comments: RD follow-up. PO intake remains poor r/t texture/appearance of foods. Patient has note difficulty chewing/swallowing. Boost Glucose Control order but with  "LOLY. RD thinks Novasource renal would be most appropriate. UBW ~163. RD suspects malnutrition unable to assess at RD visit F/U at later date. Wt loss noted 8.6% x 2 months, is significant. Wound reviewed, wound vac expected to be delivered. Dylon warranted.  Nutrition Discharge Planning: Adequate PO intake    Nutrition Risk Screen    Nutrition Risk Screen: no indicators present    Nutrition/Diet History    Spiritual, Cultural Beliefs, Hoahaoism Practices, Values that Affect Care: no  Food Allergies: NKFA  Factors Affecting Nutritional Intake: decreased appetite    Anthropometrics    Temp: 98.1 °F (36.7 °C)  Height Method: Stated  Height: 5' 5" (165.1 cm)  Height (inches): 65 in  Weight Method: Stated  Weight: 68 kg (149 lb 14.6 oz)  Weight (lb): 149.91 lb  Ideal Body Weight (IBW), Female: 125 lb  % Ideal Body Weight, Female (lb): 119.93 %  BMI (Calculated): 24.9  BMI Grade: 18.5-24.9 - normal  Usual Body Weight (UBW), k.5 kg  % Usual Body Weight: 90.25  % Weight Change From Usual Weight: -9.93 %       Lab/Procedures/Meds    Pertinent Labs Reviewed: reviewed  Pertinent Labs Comments: H/H: 8.7/28.7, MCHC 30.3, K 3.4, CO2 22, Creat 1.7, GFR 29.8, Glu 114  Pertinent Medications Reviewed: reviewed  Pertinent Medications Comments: Statin        Estimated/Assessed Needs    Weight Used For Calorie Calculations: 68 kg (149 lb 14.6 oz)  Energy Calorie Requirements (kcal): 1425 kcal/d  Energy Need Method: [place]-St Jeor (1.25 PAL)  Protein Requirements: 75 g/d (1.1 g/kg)  Weight Used For Protein Calculations: 68 kg (149 lb 14.6 oz)     Estimated Fluid Requirement Method: other (see comments) (Per MD or 1 mL/kcal)  RDA Method (mL): 1425         Nutrition Prescription Ordered    Current Diet Order: 2000 kcal ADA  Oral Nutrition Supplement: Boost Glucose Control BID    Evaluation of Received Nutrient/Fluid Intake    I/O: -295 mL  Energy Calories Required: not meeting needs  Protein Required: not meeting needs  Fluid " Required: not meeting needs  Total Fluid Intake (mL/kg): -  Comments: LBM: 5/4  Tolerance: tolerating  % Intake of Estimated Energy Needs: 75 - 100 %  % Meal Intake: 0 - 25 %    Nutrition Risk    Level of Risk/Frequency of Follow-up: low ((1x/week))     Monitor and Evaluation    Food and Nutrient Intake: energy intake, food and beverage intake  Food and Nutrient Adminstration: diet order  Knowledge/Beliefs/Attitudes: beliefs and attitudes, food and nutrition knowledge/skill  Physical Activity and Function: nutrition-related ADLs and IADLs, factors affecting access to physical activity  Anthropometric Measurements: height/length, weight, weight change, body mass index, growth pattern indices/percentile ranks  Biochemical Data, Medical Tests and Procedures: electrolyte and renal panel, gastrointestinal profile, glucose/endocrine profile, inflammatory profile, lipid profile  Nutrition-Focused Physical Findings: extremities, muscles and bones, overall appearance, skin, head and eyes     Nutrition Follow-Up    RD Follow-up?: Yes    Jayme Sears Registration Eligible, Provisional LDN

## 2023-05-05 NOTE — NURSING
Discussed wound care with MD. Patient unable to discharge with home NPWT due to requirements of wound with depth of 0.5. Her wound is shallow at this time.     Recommend nursing to remove wound vac dressing, cleanse with wound spray and apply Aquacel dressing for home.     Home Health Wound Care Recommendations:  Cleanse Left groin wound with wound cleanser  Pat dry.  Apply Aquacel Ag to wound bed  Cover with foam dressing to secure and absorb additional drainage.     Dressing will need to be changed based off of the drainage of the wound so dressing may be left intact until Monday when home health will see patient unless drainage is enough to show through the dressing or it becomes soiled in some way to need to be changed.   Supplies sent to the room for nursing to apply dressing.

## 2023-05-05 NOTE — DISCHARGE INSTRUCTIONS
You were admitted for peripheral artery disease and intestinal bleeding.   You can no longer take aspirin, plavix or eliquis because it will make your intestinal bleeding worse.  For pain, you can use tylenol and when it is severe, you can use the prescribed oxycodone.   You will need to follow up with primary care doctor, vascular surgery, foot doctor (podiatry), infectious disease, stomach/intestine doctor (gastroenterology), plastic surgery, blood doctor (hematology), kidney doctor (nephrology), and heart doctor (cardiology).     You don't have to change the wound dressing, unless 3 out of the 4 edges of the bandage are lifted. Otherwise, the wound care nurse will come on Monday to change it.

## 2023-05-06 ENCOUNTER — NURSE TRIAGE (OUTPATIENT)
Dept: ADMINISTRATIVE | Facility: CLINIC | Age: 83
End: 2023-05-06
Payer: MEDICARE

## 2023-05-06 LAB — BACTERIA SPEC AEROBE CULT: NO GROWTH

## 2023-05-06 NOTE — TELEPHONE ENCOUNTER
Pt's niece states that she and the patient are confused about which insulin the patient should be taking. Pt was discharged yesterday (5/5/23). Reviewed the AVS; pt should only be taking levemir 5 units QHS. Three way call initiated by pt's niece with the home health nurse, Merrill. Reviewed instructions with her as well. Instructed to call back with further questions and if BG, is above 250 or 300. Pt does have a home glucometer; recommended checking BG more frequently since insulin changes are taking place. Should also follow up with PCP.     Reason for Disposition   Caller has medicine question only, adult not sick, AND triager answers question    Protocols used: Medication Question Call-A-

## 2023-05-07 RX ORDER — PEN NEEDLE, DIABETIC 30 GX3/16"
1 NEEDLE, DISPOSABLE MISCELLANEOUS 4 TIMES DAILY
Qty: 100 EACH | Refills: 0 | OUTPATIENT
Start: 2023-05-07

## 2023-05-07 RX ORDER — DULOXETIN HYDROCHLORIDE 30 MG/1
30 CAPSULE, DELAYED RELEASE ORAL 2 TIMES DAILY
Qty: 60 CAPSULE | Refills: 0 | OUTPATIENT
Start: 2023-05-07 | End: 2024-05-06

## 2023-05-07 RX ORDER — ATORVASTATIN CALCIUM 40 MG/1
40 TABLET, FILM COATED ORAL NIGHTLY
Qty: 30 TABLET | Refills: 0 | OUTPATIENT
Start: 2023-05-07 | End: 2024-05-06

## 2023-05-08 RX ORDER — OXYCODONE HYDROCHLORIDE 5 MG/1
5 TABLET ORAL EVERY 12 HOURS PRN
Qty: 14 TABLET | Refills: 0 | OUTPATIENT
Start: 2023-05-08

## 2023-05-10 LAB — BACTERIA SPEC ANAEROBE CULT: NORMAL

## 2023-05-15 ENCOUNTER — TELEPHONE (OUTPATIENT)
Dept: VASCULAR SURGERY | Facility: CLINIC | Age: 83
End: 2023-05-15
Payer: MEDICARE

## 2023-05-15 DIAGNOSIS — Z95.828 S/P FEMORAL-FEMORAL BYPASS SURGERY: Primary | ICD-10-CM

## 2023-05-15 NOTE — TELEPHONE ENCOUNTER
Spoke with Shannon at [facility]  to let them know that they should call Dr Sherice medel. They also wanted to know why appts were changed and would like something sooner.      ----- Message from Danny Dunbar MD sent at 5/15/2023  3:18 PM CDT -----  Please have her contact Dr. Smiley office STAT  ----- Message -----  From: Abiola Vazquez MA  Sent: 5/15/2023   2:35 PM CDT  To: Danny Dunbar MD      ----- Message -----  From: Kuldeep Leavitt  Sent: 5/15/2023  12:00 PM CDT  To: Bettina Delgado Staff    Type: Patient Call Back    Who called:[facility] - teri     What is the request in detail:drain in her leg for over two months the nurse would like to know what should she do     Can the clinic reply by [facility]?    Would the patient rather a call back or a response via [facility]?    Best call back number: [phone]    Additional Information:

## 2023-05-16 ENCOUNTER — HOSPITAL ENCOUNTER (INPATIENT)
Facility: HOSPITAL | Age: 83
LOS: 6 days | Discharge: HOME HEALTH | DRG: 640 | End: 2023-05-22
Attending: EMERGENCY MEDICINE | Admitting: STUDENT IN AN ORGANIZED HEALTH CARE EDUCATION/TRAINING PROGRAM
Payer: MEDICARE

## 2023-05-16 DIAGNOSIS — Z95.828 S/P FEMORAL-FEMORAL BYPASS SURGERY: ICD-10-CM

## 2023-05-16 DIAGNOSIS — Z48.02 REMOVAL OF STAPLE: ICD-10-CM

## 2023-05-16 DIAGNOSIS — E87.6 HYPOKALEMIA: ICD-10-CM

## 2023-05-16 DIAGNOSIS — Z48.03 CHANGE OR REMOVAL OF DRAINS: ICD-10-CM

## 2023-05-16 DIAGNOSIS — K92.1 MELENA: ICD-10-CM

## 2023-05-16 DIAGNOSIS — R07.9 CHEST PAIN: ICD-10-CM

## 2023-05-16 DIAGNOSIS — R19.7 DIARRHEA OF PRESUMED INFECTIOUS ORIGIN: Primary | ICD-10-CM

## 2023-05-16 PROBLEM — E83.42 HYPOMAGNESEMIA: Status: ACTIVE | Noted: 2023-05-16

## 2023-05-16 PROBLEM — E55.9 VITAMIN D DEFICIENCY: Status: ACTIVE | Noted: 2023-05-16

## 2023-05-16 PROBLEM — E87.0 HYPERNATREMIA: Status: ACTIVE | Noted: 2023-05-16

## 2023-05-16 LAB
ALBUMIN SERPL BCP-MCNC: 2.3 G/DL (ref 3.5–5.2)
ALP SERPL-CCNC: 47 U/L (ref 55–135)
ALT SERPL W/O P-5'-P-CCNC: 6 U/L (ref 10–44)
ANION GAP SERPL CALC-SCNC: 14 MMOL/L (ref 8–16)
AST SERPL-CCNC: 18 U/L (ref 10–40)
BASOPHILS # BLD AUTO: 0.01 K/UL (ref 0–0.2)
BASOPHILS NFR BLD: 0.1 % (ref 0–1.9)
BILIRUB SERPL-MCNC: 0.4 MG/DL (ref 0.1–1)
BNP SERPL-MCNC: 284 PG/ML (ref 0–99)
BUN SERPL-MCNC: 13 MG/DL (ref 8–23)
CALCIUM SERPL-MCNC: 7.9 MG/DL (ref 8.7–10.5)
CHLORIDE SERPL-SCNC: 100 MMOL/L (ref 95–110)
CO2 SERPL-SCNC: 32 MMOL/L (ref 23–29)
CREAT SERPL-MCNC: 1.1 MG/DL (ref 0.5–1.4)
DIFFERENTIAL METHOD: ABNORMAL
EOSINOPHIL # BLD AUTO: 0.5 K/UL (ref 0–0.5)
EOSINOPHIL NFR BLD: 6 % (ref 0–8)
ERYTHROCYTE [DISTWIDTH] IN BLOOD BY AUTOMATED COUNT: 15.6 % (ref 11.5–14.5)
EST. GFR  (NO RACE VARIABLE): 50 ML/MIN/1.73 M^2
GLUCOSE SERPL-MCNC: 158 MG/DL (ref 70–110)
HCT VFR BLD AUTO: 21.8 % (ref 37–48.5)
HGB BLD-MCNC: 7.1 G/DL (ref 12–16)
IMM GRANULOCYTES # BLD AUTO: 0.03 K/UL (ref 0–0.04)
IMM GRANULOCYTES NFR BLD AUTO: 0.3 % (ref 0–0.5)
LYMPHOCYTES # BLD AUTO: 1.4 K/UL (ref 1–4.8)
LYMPHOCYTES NFR BLD: 15.9 % (ref 18–48)
MAGNESIUM SERPL-MCNC: 1 MG/DL (ref 1.6–2.6)
MCH RBC QN AUTO: 27 PG (ref 27–31)
MCHC RBC AUTO-ENTMCNC: 32.6 G/DL (ref 32–36)
MCV RBC AUTO: 83 FL (ref 82–98)
MONOCYTES # BLD AUTO: 0.5 K/UL (ref 0.3–1)
MONOCYTES NFR BLD: 5.4 % (ref 4–15)
NEUTROPHILS # BLD AUTO: 6.4 K/UL (ref 1.8–7.7)
NEUTROPHILS NFR BLD: 72.3 % (ref 38–73)
NRBC BLD-RTO: 0 /100 WBC
PHOSPHATE SERPL-MCNC: 2.6 MG/DL (ref 2.7–4.5)
PLATELET # BLD AUTO: 304 K/UL (ref 150–450)
PMV BLD AUTO: 10.6 FL (ref 9.2–12.9)
POCT GLUCOSE: 134 MG/DL (ref 70–110)
POTASSIUM SERPL-SCNC: 2.1 MMOL/L (ref 3.5–5.1)
PROT SERPL-MCNC: 6.9 G/DL (ref 6–8.4)
RBC # BLD AUTO: 2.63 M/UL (ref 4–5.4)
SODIUM SERPL-SCNC: 146 MMOL/L (ref 136–145)
TROPONIN I SERPL DL<=0.01 NG/ML-MCNC: 0.09 NG/ML (ref 0–0.03)
WBC # BLD AUTO: 8.87 K/UL (ref 3.9–12.7)

## 2023-05-16 PROCEDURE — 80053 COMPREHEN METABOLIC PANEL: CPT | Performed by: EMERGENCY MEDICINE

## 2023-05-16 PROCEDURE — 25000003 PHARM REV CODE 250: Performed by: EMERGENCY MEDICINE

## 2023-05-16 PROCEDURE — 63600175 PHARM REV CODE 636 W HCPCS: Performed by: EMERGENCY MEDICINE

## 2023-05-16 PROCEDURE — 93010 ELECTROCARDIOGRAM REPORT: CPT | Mod: ,,, | Performed by: INTERNAL MEDICINE

## 2023-05-16 PROCEDURE — 25000003 PHARM REV CODE 250: Performed by: STUDENT IN AN ORGANIZED HEALTH CARE EDUCATION/TRAINING PROGRAM

## 2023-05-16 PROCEDURE — 83880 ASSAY OF NATRIURETIC PEPTIDE: CPT | Performed by: EMERGENCY MEDICINE

## 2023-05-16 PROCEDURE — 83735 ASSAY OF MAGNESIUM: CPT | Performed by: EMERGENCY MEDICINE

## 2023-05-16 PROCEDURE — 84484 ASSAY OF TROPONIN QUANT: CPT | Performed by: EMERGENCY MEDICINE

## 2023-05-16 PROCEDURE — 85025 COMPLETE CBC W/AUTO DIFF WBC: CPT | Performed by: EMERGENCY MEDICINE

## 2023-05-16 PROCEDURE — 63600175 PHARM REV CODE 636 W HCPCS: Performed by: STUDENT IN AN ORGANIZED HEALTH CARE EDUCATION/TRAINING PROGRAM

## 2023-05-16 PROCEDURE — 93005 ELECTROCARDIOGRAM TRACING: CPT

## 2023-05-16 PROCEDURE — 96365 THER/PROPH/DIAG IV INF INIT: CPT

## 2023-05-16 PROCEDURE — 93010 EKG 12-LEAD: ICD-10-PCS | Mod: ,,, | Performed by: INTERNAL MEDICINE

## 2023-05-16 PROCEDURE — 99291 CRITICAL CARE FIRST HOUR: CPT

## 2023-05-16 PROCEDURE — 96368 THER/DIAG CONCURRENT INF: CPT

## 2023-05-16 PROCEDURE — 11000001 HC ACUTE MED/SURG PRIVATE ROOM

## 2023-05-16 PROCEDURE — 84100 ASSAY OF PHOSPHORUS: CPT | Performed by: EMERGENCY MEDICINE

## 2023-05-16 PROCEDURE — 96375 TX/PRO/DX INJ NEW DRUG ADDON: CPT

## 2023-05-16 PROCEDURE — 27000207 HC ISOLATION

## 2023-05-16 RX ORDER — DULOXETIN HYDROCHLORIDE 30 MG/1
30 CAPSULE, DELAYED RELEASE ORAL 2 TIMES DAILY
Status: DISCONTINUED | OUTPATIENT
Start: 2023-05-16 | End: 2023-05-22 | Stop reason: HOSPADM

## 2023-05-16 RX ORDER — IBUPROFEN 200 MG
24 TABLET ORAL
Status: DISCONTINUED | OUTPATIENT
Start: 2023-05-16 | End: 2023-05-22 | Stop reason: HOSPADM

## 2023-05-16 RX ORDER — POTASSIUM CHLORIDE 7.45 MG/ML
10 INJECTION INTRAVENOUS
Status: COMPLETED | OUTPATIENT
Start: 2023-05-16 | End: 2023-05-16

## 2023-05-16 RX ORDER — ONDANSETRON 2 MG/ML
4 INJECTION INTRAMUSCULAR; INTRAVENOUS EVERY 8 HOURS PRN
Status: DISCONTINUED | OUTPATIENT
Start: 2023-05-16 | End: 2023-05-22 | Stop reason: HOSPADM

## 2023-05-16 RX ORDER — SODIUM CHLORIDE, SODIUM LACTATE, POTASSIUM CHLORIDE, CALCIUM CHLORIDE 600; 310; 30; 20 MG/100ML; MG/100ML; MG/100ML; MG/100ML
INJECTION, SOLUTION INTRAVENOUS CONTINUOUS
Status: DISCONTINUED | OUTPATIENT
Start: 2023-05-16 | End: 2023-05-17

## 2023-05-16 RX ORDER — HYDRALAZINE HYDROCHLORIDE 20 MG/ML
5 INJECTION INTRAMUSCULAR; INTRAVENOUS
Status: COMPLETED | OUTPATIENT
Start: 2023-05-16 | End: 2023-05-16

## 2023-05-16 RX ORDER — POLYETHYLENE GLYCOL 3350 17 G/17G
17 POWDER, FOR SOLUTION ORAL 2 TIMES DAILY PRN
Status: DISCONTINUED | OUTPATIENT
Start: 2023-05-16 | End: 2023-05-17

## 2023-05-16 RX ORDER — OXYCODONE HYDROCHLORIDE 5 MG/1
5 TABLET ORAL EVERY 6 HOURS PRN
Status: DISCONTINUED | OUTPATIENT
Start: 2023-05-16 | End: 2023-05-22 | Stop reason: HOSPADM

## 2023-05-16 RX ORDER — PANTOPRAZOLE SODIUM 40 MG/1
40 TABLET, DELAYED RELEASE ORAL DAILY
Status: DISCONTINUED | OUTPATIENT
Start: 2023-05-17 | End: 2023-05-18

## 2023-05-16 RX ORDER — NALOXONE HCL 0.4 MG/ML
0.02 VIAL (ML) INJECTION
Status: DISCONTINUED | OUTPATIENT
Start: 2023-05-16 | End: 2023-05-22 | Stop reason: HOSPADM

## 2023-05-16 RX ORDER — TALC
6 POWDER (GRAM) TOPICAL NIGHTLY
Status: DISCONTINUED | OUTPATIENT
Start: 2023-05-16 | End: 2023-05-22 | Stop reason: HOSPADM

## 2023-05-16 RX ORDER — MAGNESIUM SULFATE HEPTAHYDRATE 40 MG/ML
2 INJECTION, SOLUTION INTRAVENOUS ONCE
Status: COMPLETED | OUTPATIENT
Start: 2023-05-16 | End: 2023-05-16

## 2023-05-16 RX ORDER — CARVEDILOL 12.5 MG/1
25 TABLET ORAL 2 TIMES DAILY WITH MEALS
Status: DISCONTINUED | OUTPATIENT
Start: 2023-05-16 | End: 2023-05-22 | Stop reason: HOSPADM

## 2023-05-16 RX ORDER — GLUCAGON 1 MG
1 KIT INJECTION
Status: DISCONTINUED | OUTPATIENT
Start: 2023-05-16 | End: 2023-05-22 | Stop reason: HOSPADM

## 2023-05-16 RX ORDER — ACETAMINOPHEN 325 MG/1
650 TABLET ORAL EVERY 8 HOURS PRN
Status: DISCONTINUED | OUTPATIENT
Start: 2023-05-16 | End: 2023-05-22 | Stop reason: HOSPADM

## 2023-05-16 RX ORDER — DEXTROSE MONOHYDRATE 50 MG/ML
INJECTION, SOLUTION INTRAVENOUS CONTINUOUS
Status: DISCONTINUED | OUTPATIENT
Start: 2023-05-16 | End: 2023-05-17

## 2023-05-16 RX ORDER — IBUPROFEN 200 MG
16 TABLET ORAL
Status: DISCONTINUED | OUTPATIENT
Start: 2023-05-16 | End: 2023-05-22 | Stop reason: HOSPADM

## 2023-05-16 RX ORDER — ATORVASTATIN CALCIUM 40 MG/1
40 TABLET, FILM COATED ORAL NIGHTLY
Status: DISCONTINUED | OUTPATIENT
Start: 2023-05-16 | End: 2023-05-22 | Stop reason: HOSPADM

## 2023-05-16 RX ORDER — AMLODIPINE BESYLATE 5 MG/1
10 TABLET ORAL DAILY
Status: DISCONTINUED | OUTPATIENT
Start: 2023-05-16 | End: 2023-05-22 | Stop reason: HOSPADM

## 2023-05-16 RX ADMIN — POTASSIUM BICARBONATE 25 MEQ: 977.5 TABLET, EFFERVESCENT ORAL at 08:05

## 2023-05-16 RX ADMIN — SODIUM CHLORIDE, POTASSIUM CHLORIDE, SODIUM LACTATE AND CALCIUM CHLORIDE 125 ML: 600; 310; 30; 20 INJECTION, SOLUTION INTRAVENOUS at 11:05

## 2023-05-16 RX ADMIN — INSULIN DETEMIR 4 UNITS: 100 INJECTION, SOLUTION SUBCUTANEOUS at 08:05

## 2023-05-16 RX ADMIN — POTASSIUM CHLORIDE 10 MEQ: 7.46 INJECTION, SOLUTION INTRAVENOUS at 03:05

## 2023-05-16 RX ADMIN — SODIUM CHLORIDE 100 MG: 900 INJECTION INTRAVENOUS at 05:05

## 2023-05-16 RX ADMIN — Medication 6 MG: at 08:05

## 2023-05-16 RX ADMIN — DULOXETINE 30 MG: 30 CAPSULE, DELAYED RELEASE ORAL at 08:05

## 2023-05-16 RX ADMIN — HYDRALAZINE HYDROCHLORIDE 5 MG: 20 INJECTION INTRAMUSCULAR; INTRAVENOUS at 04:05

## 2023-05-16 RX ADMIN — ATORVASTATIN CALCIUM 40 MG: 40 TABLET, FILM COATED ORAL at 08:05

## 2023-05-16 RX ADMIN — PIPERACILLIN AND TAZOBACTAM 4.5 G: 4; .5 INJECTION, POWDER, LYOPHILIZED, FOR SOLUTION INTRAVENOUS; PARENTERAL at 05:05

## 2023-05-16 RX ADMIN — MAGNESIUM SULFATE HEPTAHYDRATE 2 G: 40 INJECTION, SOLUTION INTRAVENOUS at 06:05

## 2023-05-16 RX ADMIN — MAGNESIUM SULFATE HEPTAHYDRATE 2 G: 40 INJECTION, SOLUTION INTRAVENOUS at 03:05

## 2023-05-16 RX ADMIN — POTASSIUM BICARBONATE 25 MEQ: 977.5 TABLET, EFFERVESCENT ORAL at 05:05

## 2023-05-16 RX ADMIN — CARVEDILOL 25 MG: 12.5 TABLET, FILM COATED ORAL at 04:05

## 2023-05-16 RX ADMIN — POTASSIUM BICARBONATE 25 MEQ: 977.5 TABLET, EFFERVESCENT ORAL at 03:05

## 2023-05-16 RX ADMIN — AMLODIPINE BESYLATE 10 MG: 5 TABLET ORAL at 04:05

## 2023-05-16 NOTE — Clinical Note
Diagnosis: Hypokalemia [496280]   Admitting Provider:: WAYNE JOHNSON [[id number]]   Future Attending Provider: WAYNE JOHNSON [[id number]]   Reason for IP Medical Treatment  (Clinical interventions that can only be accomplished in the IP setting? ) :: Hypokalemia   I certify that Inpatient services for greater than or equal to 2 midnights are medically necessary:: No   Plans for Post-Acute care--if anticipated (pick the single best option):: A. No post acute care anticipated at this time   Special Needs:: Fall Risk [15]

## 2023-05-16 NOTE — ED TRIAGE NOTES
Patient instructed to come to ED for low K+ after a home health blood draw yesterday. Pt recently had a toe amputated on the right foot along with bypass surgery. Pt has staples in right thigh, two drains, bandages over wounds on upper right leg and lower abdomen. Pt has hx of CHF, HTN, and DM.  Pt is bedridden and uses a wheelchair at home. Pt denies chest pain and SOB. Placed on cardiac monitor.

## 2023-05-16 NOTE — FIRST PROVIDER EVALUATION
Emergency Department TeleTriage Encounter Note      CHIEF COMPLAINT    Chief Complaint   Patient presents with    Abnormal Lab     Pt reports her Dr called to inform her of a low K and to come to the Ed. Pt is currently on IV abx via picc line. Labs drawn yesterday by home health. Pt has no complaints.        VITAL SIGNS   Initial Vitals [05/16/23 1253]   BP Pulse Resp Temp SpO2   (!) 139/56 78 16 98.5 °F (36.9 °C) 98 %      MAP       --            ALLERGIES    Review of patient's allergies indicates:   Allergen Reactions    Motrin [ibuprofen] Itching       PROVIDER TRIAGE NOTE  This is a teletriage evaluation of a 82 y.o. female presenting to the ED complaining of abnormal lab.Patient sent to the ED for evaluation of hypokalemia noted on labs done by home health. She reports diarrhea the past few days.    Patient is alert and oriented. She speaks in complete sentences. She is sitting upright in the chair in no distress.     Initial orders will be placed and care will be transferred to an alternate provider when patient is roomed for a full evaluation. Any additional orders and the final disposition will be determined by that provider.         ORDERS  Labs Reviewed   POCT GLUCOSE - Abnormal; Notable for the following components:       Result Value    POCT Glucose 134 (*)     All other components within normal limits   CBC W/ AUTO DIFFERENTIAL   COMPREHENSIVE METABOLIC PANEL   MAGNESIUM       ED Orders (720h ago, onward)      Start Ordered     Status Ordering Provider    05/16/23 1257 05/16/23 1256  CBC auto differential  STAT         Ordered RENE BRYAN    05/16/23 1257 05/16/23 1256  Comprehensive metabolic panel  STAT         Ordered RENE BRYAN    05/16/23 1257 05/16/23 1256  Magnesium  STAT         Ordered RENE BRYAN    05/16/23 1257 05/16/23 1256  EKG 12-lead  Once         Ordered RENE BRYAN    05/16/23 1255 05/16/23 1255  POCT glucose  Once         Final result EMERGENCY, DEPT PHYSICIAN               Virtual Visit Note: The provider triage portion of this emergency department evaluation and documentation was performed via [email or web address], a HIPAA-compliant telemedicine application, in concert with a tele-presenter in the room. A face to face patient evaluation with one of my colleagues will occur once the patient is placed in an emergency department room.      DISCLAIMER: This note was prepared with [employer] voice recognition transcription software. Garbled syntax, mangled pronouns, and other bizarre constructions may be attributed to that software system.

## 2023-05-16 NOTE — ED PROVIDER NOTES
Encounter Date: 5/16/2023    SCRIBE #1 NOTE: I, Sally Patel, am scribing for, and in the presence of,  Indira Ware DO. I have scribed the following portions of the note - Other sections scribed: HPI, ROS, PE.     History     Chief Complaint   Patient presents with    Abnormal Lab     Pt reports her Dr called to inform her of a low K and to come to the Ed. Pt is currently on IV abx via picc line. Labs drawn yesterday by home health. Pt has no complaints.      Nelsy Marino is a 82 y.o. female with Hx of CHF, DM, and HTN, who presents to the ED for chief complaint of abnormal labs. Patient reports she was called by her home health nurse advising her to come to the ED as her blood tests results revealed her potassium was low. Patient has no other acute complaints. No other exacerbating or alleviating factors. Patient is allergic to Motrin.    The history is provided by the patient. No  was used.   Review of patient's allergies indicates:   Allergen Reactions    Motrin [ibuprofen] Itching     Past Medical History:   Diagnosis Date    CHF (congestive heart failure)     Diabetes mellitus     DVT (deep venous thrombosis)     Hypertension     Miscarriage      Past Surgical History:   Procedure Laterality Date     femoral vascular surgery       ANGIOGRAPHY OF LOWER EXTREMITY Right 2/23/2023    Procedure: Angiogram Extremity Unilateral;  Surgeon: Danny Dunbar MD;  Location: [facility];  Service: Vascular;  Laterality: Right;  RN PHONE PREOP 2/20/2023----CK CONSENT    ANGIOGRAPHY OF LOWER EXTREMITY Right 4/4/2023    Procedure: Angiogram Extremity Unilateral - possible endovascular intervention;  Surgeon: René Simms MD;  Location: [address];  Service: Vascular;  Laterality: Right;  19.6 mins.  740.81 mGy  73.3399 Gy.cm  58ml dye  local - 1ml    CREATION OF FEMORAL-FEMORAL ARTERY BYPASS WITH GRAFT Bilateral 3/2/2023    Procedure: CREATION, BYPASS, ARTERIAL, FEMORAL TO FEMORAL, USING  GRAFT, LEFT FEMORAL ENDARTERECTOMY, LEFT ILIAC ARTERY STENT PLACEMENT, RIGHT LOWER EXTREMITY ANGIOGRAM AND RIGHT SFA  ANGIOPLASTY;  Surgeon: Danny Dunbar MD;  [facility];  Service: Vascular;  Laterality: Bilateral;   744.47 MGY  175.36 Gycm  Flouro time 20.5 mins      CREATION OF ILIOFEMORAL ARTERY BYPASS Right 3/20/2019    Procedure: CREATION, BYPASS, ARTERIAL, ILIAC TO FEMORAL, RIGHT LOWER EXTREMITY, RIGHT PROFUNDAPLASTY;  Surgeon: Danny Dunbar MD;  Location: [facility];  Service: Vascular;  Laterality: Right;  11:00AM START PER NAZANIN RICHARDS PREOP 3/13/2019  ACT'S, CELL SAVER, GRAFTS, BOOK WATER---NEED H/P  CONSENT IN AM------HGBA1C    CREATION OF MUSCLE ROTATIONAL FLAP Left 3/31/2023    Procedure: CREATION, FLAP, MUSCLE ROTATION;  Surgeon: Brandon Smiley MD;  [facility];  Service: Plastics;  Laterality: Left;    ESOPHAGOGASTRODUODENOSCOPY N/A 3/17/2023    Procedure: EGD (ESOPHAGOGASTRODUODENOSCOPY);  Surgeon: Gee Rodriguez MD;  [facility]  Service: Endoscopy;  Laterality: N/A;    ESOPHAGOGASTRODUODENOSCOPY N/A 4/11/2023    Procedure: EGD (ESOPHAGOGASTRODUODENOSCOPY);  Surgeon: Nazanin Kaiser MD;  [facility]  Service: Endoscopy;  Laterality: N/A;    ESOPHAGOGASTRODUODENOSCOPY N/A 4/26/2023    Procedure: EGD (ESOPHAGOGASTRODUODENOSCOPY);  Surgeon: Gee Rodriguez MD;  [facility]: [facility]  Service: Endoscopy;  Laterality: N/A;    ESOPHAGOGASTRODUODENOSCOPY N/A 5/2/2023    Procedure: EGD (ESOPHAGOGASTRODUODENOSCOPY);  Surgeon: Cuong Conteh MD;  [facility]  Service: Endoscopy;  Laterality: N/A;    FOOT AMPUTATION THROUGH METATARSAL Right 3/4/2023    Procedure: AMPUTATION, FOOT, TRANSMETATARSAL;  Surgeon: Benjamin Godfrey DPM;  Location: [facility] OR [address] [place] [address];  Service: Podiatry;  Laterality: Right;    HYSTERECTOMY      ?unsure cervix present    INCISION AND DRAINAGE Right 4/12/2019     Procedure: Incision and Drainage - R groin washout, possible muscle flap;  Surgeon: Danny Dunbar MD;  Location: [facility] OR 2ND FLR;  Service: Vascular;  Laterality: Right;  groin washout    INCISION AND DRAINAGE OF GROIN Right 5/3/2019    Procedure: Incision and Drainage R groin;  Surgeon: Danny Dunbar MD;  Location: [facility] OR 2ND FLR;  Service: Vascular;  Laterality: Right;    WOUND DEBRIDEMENT Left 3/31/2023    Procedure: DEBRIDEMENT, WOUND;  Surgeon: René Simms MD;  [facility];  Service: Vascular;  Laterality: Left;     Family History   Problem Relation Age of Onset    Diabetes Father     Hypertension Father      Social History     Tobacco Use    Smoking status: Former     Types: Cigarettes     Quit date:      Years since quittin.4    Smokeless tobacco: Never   Substance Use Topics    Alcohol use: No    Drug use: No     Review of Systems   Constitutional:  Negative for chills and fever.   HENT:  Negative for sore throat and voice change.    Respiratory:  Negative for shortness of breath.    Cardiovascular:  Negative for chest pain.        (+) abnormal labs.   Gastrointestinal:  Negative for abdominal pain, nausea and vomiting.   Genitourinary:  Negative for dysuria, vaginal bleeding and vaginal discharge.   Musculoskeletal:  Negative for back pain.   Skin:  Negative for rash.   Neurological:  Negative for dizziness and weakness.   Hematological:  Does not bruise/bleed easily.   All other systems reviewed and are negative.    Physical Exam     Initial Vitals [23 1253]   BP Pulse Resp Temp SpO2   (!) 139/56 78 16 98.5 °F (36.9 °C) 98 %      MAP       --         Physical Exam    Nursing note and vitals reviewed.  Constitutional: She appears well-developed and well-nourished. No distress.   Speaking clearly. Awake.   HENT:   Head: Normocephalic and atraumatic.   Right Ear: External ear normal.   Left Ear: External ear normal.   Nose: Nose normal.   Eyes: EOM are normal.  Pupils are equal, round, and reactive to light. Right eye exhibits no discharge. Left eye exhibits no discharge. No scleral icterus.   Neck: Neck supple. JVD (bilateral) present.   Normal range of motion.  Cardiovascular:  Normal rate and intact distal pulses.     Exam reveals no friction rub.       No murmur heard.  Pulmonary/Chest: Breath sounds normal. No respiratory distress.   Abdominal: Abdomen is soft. Bowel sounds are normal. There is no abdominal tenderness. There is no rebound.   Musculoskeletal:         General: No tenderness or edema. Normal range of motion.      Cervical back: Normal range of motion and neck supple.     Neurological: She is alert and oriented to person, place, and time.   Skin: Skin is warm and dry. Capillary refill takes less than 2 seconds.   Surgical incisions and drains, but no complaints regarding these.   Psychiatric: She has a normal mood and affect.     Patient gave consent to have a physical exam performed.  ED Course   Critical Care    Date/Time: 5/16/2023 4:29 PM  Performed by: Indira Ware DO  Authorized by: Indira Ware DO   Direct patient critical care time: 6 minutes  Additional history critical care time: 6 minutes  Ordering / reviewing critical care time: 6 minutes  Documentation critical care time: 6 minutes  Consulting other physicians critical care time: 6 minutes  Consult with family critical care time: 8 minutes  Other critical care time: 0 minutes  Total critical care time (exclusive of procedural time) : 38 minutes  Critical care time was exclusive of separately billable procedures and treating other patients and teaching time.  Critical care was necessary to treat or prevent imminent or life-threatening deterioration of the following conditions: cardiac failure, circulatory failure and shock.  Critical care was time spent personally by me on the following activities: discussions with consultants, discussions with primary provider, development of treatment plan  with patient or surrogate, evaluation of patient's response to treatment, examination of patient, obtaining history from patient or surrogate, ordering and performing treatments and interventions, ordering and review of laboratory studies, ordering and review of radiographic studies and review of old charts.  Subsequent provider of critical care: I assumed direction of critical care for this patient from another provider of my specialty.      Labs Reviewed   CBC W/ AUTO DIFFERENTIAL - Abnormal; Notable for the following components:       Result Value    RBC 2.63 (*)     Hemoglobin 7.1 (*)     Hematocrit 21.8 (*)     RDW 15.6 (*)     Lymph % 15.9 (*)     All other components within normal limits   COMPREHENSIVE METABOLIC PANEL - Abnormal; Notable for the following components:    Sodium 146 (*)     Potassium 2.1 (*)     CO2 32 (*)     Glucose 158 (*)     Calcium 7.9 (*)     Albumin 2.3 (*)     Alkaline Phosphatase 47 (*)     ALT 6 (*)     eGFR 50 (*)     All other components within normal limits    Narrative:       Potassium critical result(s) called and verbal readback obtained   from Bernice Benson  by JUJU 05/16/2023 14:55   MAGNESIUM - Abnormal; Notable for the following components:    Magnesium 1.0 (*)     All other components within normal limits   TROPONIN I - Abnormal; Notable for the following components:    Troponin I 0.086 (*)     All other components within normal limits   B-TYPE NATRIURETIC PEPTIDE - Abnormal; Notable for the following components:     (*)     All other components within normal limits   PHOSPHORUS - Abnormal; Notable for the following components:    Phosphorus 2.6 (*)     All other components within normal limits   POCT GLUCOSE - Abnormal; Notable for the following components:    POCT Glucose 134 (*)     All other components within normal limits   B-TYPE NATRIURETIC PEPTIDE   TROPONIN I   PHOSPHORUS     EKG Readings: (Independently Interpreted)   No STEMI. Rate 69. Normal Sinus.  Changed from previous EKG. Abnormal EKG. . Concerned for hypokalemia. When compared to prior EKG on 4/20/23 bpm decreased by 14.     Imaging Results              X-Ray Chest AP Portable (Final result)  Result time 05/16/23 14:57:55      Final result by Clovis Gomez MD (05/16/23 14:57:55)                   Impression:      Right upper extremity PICC line with tip projecting over the superior vena cava.    Coarsened interstitial markings within the lungs, nonspecific.  No focal pulmonary consolidation is evident.      Electronically signed by: Clovis Gomez MD  Date:    05/16/2023  Time:    14:57               Narrative:    EXAMINATION:  XR CHEST AP PORTABLE    CLINICAL HISTORY:  Chest Pain;    TECHNIQUE:  Single frontal view of the chest was performed.    COMPARISON:  04/20/2023.    FINDINGS:  The patient is rotated.  There is a right upper extremity PICC line with tip projecting over the superior vena cava.  The heart and mediastinal structures are within normal limits in size and unchanged.  There are coarsened interstitial markings within the lungs.  No focal pulmonary consolidation is evident.  There is no evidence for pneumothorax or large pleural effusions.  Bony structures appear intact.                                       Medications   magnesium sulfate 2g in water 50mL IVPB (premix) (2 g Intravenous New Bag 5/16/23 1802)   potassium bicarbonate disintegrating tablet 25 mEq (25 mEq Oral Given 5/16/23 1753)   melatonin tablet 6 mg (has no administration in time range)   polyethylene glycol packet 17 g (has no administration in time range)   acetaminophen tablet 650 mg (has no administration in time range)   naloxone 0.4 mg/mL injection 0.02 mg (has no administration in time range)   glucose chewable tablet 16 g (has no administration in time range)   glucose chewable tablet 24 g (has no administration in time range)   dextrose 10% bolus 125 mL 125 mL (has no administration in time range)    dextrose 10% bolus 250 mL 250 mL (has no administration in time range)   glucagon (human recombinant) injection 1 mg (has no administration in time range)   ondansetron injection 4 mg (has no administration in time range)   amLODIPine tablet 10 mg (10 mg Oral Given 5/16/23 1628)   atorvastatin tablet 40 mg (has no administration in time range)   carvediloL tablet 25 mg (25 mg Oral Given 5/16/23 1627)   piperacillin-tazobactam (ZOSYN) 4.5 g in dextrose 5 % in water (D5W) 5 % 100 mL IVPB (MB+) (has no administration in time range)   DULoxetine DR capsule 30 mg (has no administration in time range)   insulin detemir U-100 (Levemir) pen 4 Units (has no administration in time range)   oxyCODONE immediate release tablet 5 mg (has no administration in time range)   pantoprazole EC tablet 40 mg (has no administration in time range)   micafungin 100 mg in sodium chloride 0.9 % 100 mL IVPB (MB+) (100 mg Intravenous New Bag 5/16/23 1731)   magnesium sulfate 2g in water 50mL IVPB (premix) (0 g Intravenous Stopped 5/16/23 1731)   potassium bicarbonate disintegrating tablet 25 mEq (25 mEq Oral Given 5/16/23 1509)   potassium chloride 10 mEq in 100 mL IVPB (0 mEq Intravenous Stopped 5/16/23 1701)   hydrALAZINE injection 5 mg (5 mg Intravenous Given 5/16/23 1627)   piperacillin-tazobactam (ZOSYN) 4.5 g in dextrose 5 % in water (D5W) 5 % 100 mL IVPB (MB+) (0 g Intravenous Stopped 5/16/23 1758)     Medical Decision Making:   History:   Old Medical Records: I decided to obtain old medical records.   Medical decision making   Chief Complaint   Patient presents with    Abnormal Lab     Pt reports her Dr called to inform her of a low K and to come to the Ed. Pt is currently on IV abx via picc line. Labs drawn yesterday by home health. Pt has no complaints.      Differential diagnosis: hypokalemia, EKG changes, dehydration, hypertension, uncontrolled hypertension.  Treatment in the ED included Physical Exam, IV magnesium, IV potassium,  oral potassium.    In shared decision-making with patient and her  at bedside we discussed   diagnosis, labs, and imaging results.    Patient and  are agreeable to admission at [facility].    I spoke with utilization Management,Lexi, at 3:40 pm.  Discussed patient's presentation, past medical history, physical exam, labs, radiology results, vital signs, and ED course.  [facility] medicine consulted with DR Hector Rivera who accepted the patient for  admission at 4:04 p.m..  At this time patient will be admitted.      Patient is awake alert oriented x4 speaking clearly in full sentences       Medications   magnesium sulfate 2g in water 50mL IVPB (premix) (2 g Intravenous New Bag 5/16/23 1802)   potassium bicarbonate disintegrating tablet 25 mEq (25 mEq Oral Given 5/16/23 1753)   melatonin tablet 6 mg (has no administration in time range)   polyethylene glycol packet 17 g (has no administration in time range)   acetaminophen tablet 650 mg (has no administration in time range)   naloxone 0.4 mg/mL injection 0.02 mg (has no administration in time range)   glucose chewable tablet 16 g (has no administration in time range)   glucose chewable tablet 24 g (has no administration in time range)   dextrose 10% bolus 125 mL 125 mL (has no administration in time range)   dextrose 10% bolus 250 mL 250 mL (has no administration in time range)   glucagon (human recombinant) injection 1 mg (has no administration in time range)   ondansetron injection 4 mg (has no administration in time range)   amLODIPine tablet 10 mg (10 mg Oral Given 5/16/23 1628)   atorvastatin tablet 40 mg (has no administration in time range)   carvediloL tablet 25 mg (25 mg Oral Given 5/16/23 1627)   piperacillin-tazobactam (ZOSYN) 4.5 g in dextrose 5 % in water (D5W) 5 % 100 mL IVPB (MB+) (has no administration in time range)   DULoxetine DR capsule 30 mg (has no administration in time range)   insulin detemir U-100 (Levemir)  pen 4 Units (has no administration in time range)   oxyCODONE immediate release tablet 5 mg (has no administration in time range)   pantoprazole EC tablet 40 mg (has no administration in time range)   micafungin 100 mg in sodium chloride 0.9 % 100 mL IVPB (MB+) (100 mg Intravenous New Bag 5/16/23 1731)   magnesium sulfate 2g in water 50mL IVPB (premix) (0 g Intravenous Stopped 5/16/23 1731)   potassium bicarbonate disintegrating tablet 25 mEq (25 mEq Oral Given 5/16/23 1509)   potassium chloride 10 mEq in 100 mL IVPB (0 mEq Intravenous Stopped 5/16/23 1701)   hydrALAZINE injection 5 mg (5 mg Intravenous Given 5/16/23 1627)   piperacillin-tazobactam (ZOSYN) 4.5 g in dextrose 5 % in water (D5W) 5 % 100 mL IVPB (MB+) (0 g Intravenous Stopped 5/16/23 1758)        Scribe Attestation:   Scribe #1: I performed the above scribed service and the documentation accurately describes the services I performed. I attest to the accuracy of the note.      ED Course as of 05/16/23 1836   Tue May 16, 2023   1428 EKG interpreted by Dr. Ware  No STEMI, normal sinus rhythm, ventricular rate of 69.  Abnormal EKG, normal axis.  QTC normal at 415.  EKG changes suspicious for hypokalemia appreciated when When compared to prior EKG.  When compared to EKG done on 04/20/2023 rate has decreased by 13 beats per minute today   [RF]      ED Course User Index  [RF] Indira Ware DO              I, Dr. Indira Ware, personally performed the services described in this documentation. This document was produced by a scribe under my direction and in my presence. All medical record entries made by the scribe were at my direction and in my presence.  I have reviewed the chart and agree that the record reflects my personal performance and is accurate and complete. Indira Ware DO.     05/16/2023 6:36 PM      Clinical Impression:   Final diagnoses:  [E87.6] Hypokalemia        ED Disposition Condition    Admit Stable                Indira Ware  DO  05/16/23 1836

## 2023-05-17 LAB
ABO + RH BLD: ABNORMAL
ALBUMIN SERPL BCP-MCNC: 2.2 G/DL (ref 3.5–5.2)
ALP SERPL-CCNC: 40 U/L (ref 55–135)
ALT SERPL W/O P-5'-P-CCNC: 6 U/L (ref 10–44)
ANION GAP SERPL CALC-SCNC: 11 MMOL/L (ref 8–16)
AST SERPL-CCNC: 18 U/L (ref 10–40)
BASOPHILS # BLD AUTO: 0.01 K/UL (ref 0–0.2)
BASOPHILS NFR BLD: 0.1 % (ref 0–1.9)
BILIRUB SERPL-MCNC: 0.5 MG/DL (ref 0.1–1)
BLD GP AB SCN CELLS X3 SERPL QL: ABNORMAL
BLOOD GROUP ANTIBODIES SERPL: NORMAL
BUN SERPL-MCNC: 12 MG/DL (ref 8–23)
C DIFF GDH STL QL: NEGATIVE
C DIFF TOX A+B STL QL IA: NEGATIVE
CALCIUM SERPL-MCNC: 7.7 MG/DL (ref 8.7–10.5)
CHLORIDE SERPL-SCNC: 98 MMOL/L (ref 95–110)
CO2 SERPL-SCNC: 37 MMOL/L (ref 23–29)
CREAT SERPL-MCNC: 0.9 MG/DL (ref 0.5–1.4)
DIFFERENTIAL METHOD: ABNORMAL
EOSINOPHIL # BLD AUTO: 0.3 K/UL (ref 0–0.5)
EOSINOPHIL NFR BLD: 4.1 % (ref 0–8)
ERYTHROCYTE [DISTWIDTH] IN BLOOD BY AUTOMATED COUNT: 15.9 % (ref 11.5–14.5)
EST. GFR  (NO RACE VARIABLE): >60 ML/MIN/1.73 M^2
GLUCOSE SERPL-MCNC: 196 MG/DL (ref 70–110)
HCT VFR BLD AUTO: 20 % (ref 37–48.5)
HGB BLD-MCNC: 6.2 G/DL (ref 12–16)
HGB BLD-MCNC: 7.8 G/DL (ref 12–16)
IMM GRANULOCYTES # BLD AUTO: 0.05 K/UL (ref 0–0.04)
IMM GRANULOCYTES NFR BLD AUTO: 0.6 % (ref 0–0.5)
LYMPHOCYTES # BLD AUTO: 1.1 K/UL (ref 1–4.8)
LYMPHOCYTES NFR BLD: 12.8 % (ref 18–48)
MAGNESIUM SERPL-MCNC: 2 MG/DL (ref 1.6–2.6)
MCH RBC QN AUTO: 26.1 PG (ref 27–31)
MCHC RBC AUTO-ENTMCNC: 31 G/DL (ref 32–36)
MCV RBC AUTO: 84 FL (ref 82–98)
MONOCYTES # BLD AUTO: 0.4 K/UL (ref 0.3–1)
MONOCYTES NFR BLD: 4.7 % (ref 4–15)
NEUTROPHILS # BLD AUTO: 6.5 K/UL (ref 1.8–7.7)
NEUTROPHILS NFR BLD: 77.7 % (ref 38–73)
NRBC BLD-RTO: 0 /100 WBC
PLATELET # BLD AUTO: 295 K/UL (ref 150–450)
PMV BLD AUTO: 10.7 FL (ref 9.2–12.9)
POCT GLUCOSE: 259 MG/DL (ref 70–110)
POTASSIUM SERPL-SCNC: 3.3 MMOL/L (ref 3.5–5.1)
PROT SERPL-MCNC: 6.2 G/DL (ref 6–8.4)
RBC # BLD AUTO: 2.38 M/UL (ref 4–5.4)
SODIUM SERPL-SCNC: 146 MMOL/L (ref 136–145)
SPECIMEN OUTDATE: ABNORMAL
WBC # BLD AUTO: 8.37 K/UL (ref 3.9–12.7)

## 2023-05-17 PROCEDURE — 85025 COMPLETE CBC W/AUTO DIFF WBC: CPT | Performed by: STUDENT IN AN ORGANIZED HEALTH CARE EDUCATION/TRAINING PROGRAM

## 2023-05-17 PROCEDURE — 86900 BLOOD TYPING SEROLOGIC ABO: CPT | Performed by: INTERNAL MEDICINE

## 2023-05-17 PROCEDURE — 85018 HEMOGLOBIN: CPT | Mod: 91 | Performed by: INTERNAL MEDICINE

## 2023-05-17 PROCEDURE — 25000003 PHARM REV CODE 250: Performed by: INTERNAL MEDICINE

## 2023-05-17 PROCEDURE — 86905 BLOOD TYPING RBC ANTIGENS: CPT | Performed by: INTERNAL MEDICINE

## 2023-05-17 PROCEDURE — 86922 COMPATIBILITY TEST ANTIGLOB: CPT | Performed by: INTERNAL MEDICINE

## 2023-05-17 PROCEDURE — 80053 COMPREHEN METABOLIC PANEL: CPT | Performed by: STUDENT IN AN ORGANIZED HEALTH CARE EDUCATION/TRAINING PROGRAM

## 2023-05-17 PROCEDURE — 86870 RBC ANTIBODY IDENTIFICATION: CPT | Performed by: INTERNAL MEDICINE

## 2023-05-17 PROCEDURE — 99223 PR INITIAL HOSPITAL CARE,LEVL III: ICD-10-PCS | Mod: ,,, | Performed by: NURSE PRACTITIONER

## 2023-05-17 PROCEDURE — 99223 1ST HOSP IP/OBS HIGH 75: CPT | Mod: ,,, | Performed by: NURSE PRACTITIONER

## 2023-05-17 PROCEDURE — 36415 COLL VENOUS BLD VENIPUNCTURE: CPT | Performed by: INTERNAL MEDICINE

## 2023-05-17 PROCEDURE — 83735 ASSAY OF MAGNESIUM: CPT | Performed by: STUDENT IN AN ORGANIZED HEALTH CARE EDUCATION/TRAINING PROGRAM

## 2023-05-17 PROCEDURE — 25000003 PHARM REV CODE 250: Performed by: STUDENT IN AN ORGANIZED HEALTH CARE EDUCATION/TRAINING PROGRAM

## 2023-05-17 PROCEDURE — 63600175 PHARM REV CODE 636 W HCPCS: Performed by: INTERNAL MEDICINE

## 2023-05-17 PROCEDURE — 63600175 PHARM REV CODE 636 W HCPCS: Performed by: STUDENT IN AN ORGANIZED HEALTH CARE EDUCATION/TRAINING PROGRAM

## 2023-05-17 PROCEDURE — 87449 NOS EACH ORGANISM AG IA: CPT | Performed by: STUDENT IN AN ORGANIZED HEALTH CARE EDUCATION/TRAINING PROGRAM

## 2023-05-17 PROCEDURE — 11000001 HC ACUTE MED/SURG PRIVATE ROOM

## 2023-05-17 RX ORDER — POLYETHYLENE GLYCOL 3350, SODIUM SULFATE ANHYDROUS, SODIUM BICARBONATE, SODIUM CHLORIDE, POTASSIUM CHLORIDE 236; 22.74; 6.74; 5.86; 2.97 G/4L; G/4L; G/4L; G/4L; G/4L
4000 POWDER, FOR SOLUTION ORAL ONCE
Status: DISCONTINUED | OUTPATIENT
Start: 2023-05-17 | End: 2023-05-17

## 2023-05-17 RX ORDER — HYDRALAZINE HYDROCHLORIDE 25 MG/1
50 TABLET, FILM COATED ORAL EVERY 8 HOURS
Status: DISCONTINUED | OUTPATIENT
Start: 2023-05-17 | End: 2023-05-18

## 2023-05-17 RX ORDER — HYDROCODONE BITARTRATE AND ACETAMINOPHEN 500; 5 MG/1; MG/1
TABLET ORAL
Status: DISCONTINUED | OUTPATIENT
Start: 2023-05-17 | End: 2023-05-22 | Stop reason: HOSPADM

## 2023-05-17 RX ORDER — MUPIROCIN 20 MG/G
OINTMENT TOPICAL 2 TIMES DAILY
Status: DISPENSED | OUTPATIENT
Start: 2023-05-17 | End: 2023-05-22

## 2023-05-17 RX ORDER — POTASSIUM CHLORIDE 7.45 MG/ML
10 INJECTION INTRAVENOUS
Status: COMPLETED | OUTPATIENT
Start: 2023-05-17 | End: 2023-05-17

## 2023-05-17 RX ORDER — DEXTROSE MONOHYDRATE 50 MG/ML
INJECTION, SOLUTION INTRAVENOUS CONTINUOUS
Status: ACTIVE | OUTPATIENT
Start: 2023-05-17 | End: 2023-05-17

## 2023-05-17 RX ORDER — SODIUM CHLORIDE, SODIUM LACTATE, POTASSIUM CHLORIDE, CALCIUM CHLORIDE 600; 310; 30; 20 MG/100ML; MG/100ML; MG/100ML; MG/100ML
INJECTION, SOLUTION INTRAVENOUS CONTINUOUS
Status: ACTIVE | OUTPATIENT
Start: 2023-05-17 | End: 2023-05-17

## 2023-05-17 RX ORDER — POLYETHYLENE GLYCOL 3350, SODIUM SULFATE ANHYDROUS, SODIUM BICARBONATE, SODIUM CHLORIDE, POTASSIUM CHLORIDE 236; 22.74; 6.74; 5.86; 2.97 G/4L; G/4L; G/4L; G/4L; G/4L
4000 POWDER, FOR SOLUTION ORAL ONCE
Status: COMPLETED | OUTPATIENT
Start: 2023-05-17 | End: 2023-05-17

## 2023-05-17 RX ADMIN — HYDRALAZINE HYDROCHLORIDE 50 MG: 25 TABLET, FILM COATED ORAL at 02:05

## 2023-05-17 RX ADMIN — OXYCODONE HYDROCHLORIDE 5 MG: 5 TABLET ORAL at 03:05

## 2023-05-17 RX ADMIN — AMLODIPINE BESYLATE 10 MG: 5 TABLET ORAL at 08:05

## 2023-05-17 RX ADMIN — CARVEDILOL 25 MG: 12.5 TABLET, FILM COATED ORAL at 08:05

## 2023-05-17 RX ADMIN — CARVEDILOL 25 MG: 12.5 TABLET, FILM COATED ORAL at 06:05

## 2023-05-17 RX ADMIN — PANTOPRAZOLE SODIUM 40 MG: 40 TABLET, DELAYED RELEASE ORAL at 08:05

## 2023-05-17 RX ADMIN — INSULIN DETEMIR 4 UNITS: 100 INJECTION, SOLUTION SUBCUTANEOUS at 10:05

## 2023-05-17 RX ADMIN — DULOXETINE 30 MG: 30 CAPSULE, DELAYED RELEASE ORAL at 08:05

## 2023-05-17 RX ADMIN — POTASSIUM CHLORIDE 10 MEQ: 7.46 INJECTION, SOLUTION INTRAVENOUS at 02:05

## 2023-05-17 RX ADMIN — POTASSIUM CHLORIDE 10 MEQ: 7.46 INJECTION, SOLUTION INTRAVENOUS at 12:05

## 2023-05-17 RX ADMIN — POTASSIUM CHLORIDE 10 MEQ: 7.46 INJECTION, SOLUTION INTRAVENOUS at 01:05

## 2023-05-17 RX ADMIN — PIPERACILLIN AND TAZOBACTAM 4.5 G: 4; .5 INJECTION, POWDER, LYOPHILIZED, FOR SOLUTION INTRAVENOUS; PARENTERAL at 01:05

## 2023-05-17 RX ADMIN — ATORVASTATIN CALCIUM 40 MG: 40 TABLET, FILM COATED ORAL at 09:05

## 2023-05-17 RX ADMIN — HYDRALAZINE HYDROCHLORIDE 50 MG: 25 TABLET, FILM COATED ORAL at 09:05

## 2023-05-17 RX ADMIN — ONDANSETRON 4 MG: 2 INJECTION INTRAMUSCULAR; INTRAVENOUS at 10:05

## 2023-05-17 RX ADMIN — POTASSIUM CHLORIDE 10 MEQ: 7.46 INJECTION, SOLUTION INTRAVENOUS at 09:05

## 2023-05-17 RX ADMIN — POTASSIUM BICARBONATE 25 MEQ: 977.5 TABLET, EFFERVESCENT ORAL at 04:05

## 2023-05-17 RX ADMIN — POLYETHYLENE GLYCOL 3350, SODIUM SULFATE ANHYDROUS, SODIUM BICARBONATE, SODIUM CHLORIDE, POTASSIUM CHLORIDE 4000 ML: 236; 22.74; 6.74; 5.86; 2.97 POWDER, FOR SOLUTION ORAL at 07:05

## 2023-05-17 RX ADMIN — PIPERACILLIN AND TAZOBACTAM 4.5 G: 4; .5 INJECTION, POWDER, LYOPHILIZED, FOR SOLUTION INTRAVENOUS; PARENTERAL at 08:05

## 2023-05-17 RX ADMIN — POTASSIUM BICARBONATE 25 MEQ: 977.5 TABLET, EFFERVESCENT ORAL at 01:05

## 2023-05-17 RX ADMIN — DULOXETINE 30 MG: 30 CAPSULE, DELAYED RELEASE ORAL at 09:05

## 2023-05-17 RX ADMIN — DEXTROSE 100 ML: 5 SOLUTION INTRAVENOUS at 12:05

## 2023-05-17 RX ADMIN — MUPIROCIN: 20 OINTMENT TOPICAL at 09:05

## 2023-05-17 RX ADMIN — Medication 6 MG: at 09:05

## 2023-05-17 RX ADMIN — POTASSIUM CHLORIDE 10 MEQ: 7.46 INJECTION, SOLUTION INTRAVENOUS at 11:05

## 2023-05-17 RX ADMIN — MUPIROCIN: 20 OINTMENT TOPICAL at 11:05

## 2023-05-17 RX ADMIN — SODIUM CHLORIDE 100 MG: 900 INJECTION INTRAVENOUS at 08:05

## 2023-05-17 NOTE — PLAN OF CARE
Problem: Adult Inpatient Plan of Care  Goal: Plan of Care Review  Outcome: Ongoing, Progressing  Flowsheets (Taken 5/17/2023 1633)  Plan of Care Reviewed With:   patient   spouse  Goal: Patient-Specific Goal (Individualized)  Outcome: Ongoing, Progressing  Goal: Absence of Hospital-Acquired Illness or Injury  Outcome: Ongoing, Progressing  Intervention: Identify and Manage Fall Risk  Flowsheets (Taken 5/17/2023 1633)  Safety Promotion/Fall Prevention:   assistive device/personal item within reach   Fall Risk reviewed with patient/family   high risk medications identified   nonskid shoes/socks when out of bed   side rails raised x 2   room near unit station   medications reviewed   instructed to call staff for mobility  Intervention: Prevent Skin Injury  Flowsheets (Taken 5/17/2023 1633)  Body Position:   position changed independently   weight shifting  Skin Protection:   adhesive use limited   tubing/devices free from skin contact  Intervention: Prevent and Manage VTE (Venous Thromboembolism) Risk  Flowsheets (Taken 5/17/2023 1633)  Activity Management:   Arm raise - L1   Ankle pumps - L1   Rolling - L1   Straight leg raise - L1  VTE Prevention/Management:   ambulation promoted   bleeding precations maintained   bleeding risk assessed  Range of Motion: active ROM (range of motion) encouraged  Intervention: Prevent Infection  Flowsheets (Taken 5/17/2023 1633)  Infection Prevention: hand hygiene promoted  Goal: Optimal Comfort and Wellbeing  Outcome: Ongoing, Progressing  Goal: Readiness for Transition of Care  Outcome: Ongoing, Progressing     Problem: Fall Injury Risk  Goal: Absence of Fall and Fall-Related Injury  Outcome: Ongoing, Progressing  Intervention: Identify and Manage Contributors  Flowsheets (Taken 5/17/2023 1633)  Self-Care Promotion:   independence encouraged   BADL personal objects within reach   BADL personal routines maintained   meal set-up provided   safe use of adaptive equipment  encouraged  Medication Review/Management:   medications reviewed   high-risk medications identified  Intervention: Promote Injury-Free Environment  Flowsheets (Taken 5/17/2023 1633)  Safety Promotion/Fall Prevention:   assistive device/personal item within reach   Fall Risk reviewed with patient/family   high risk medications identified   nonskid shoes/socks when out of bed   side rails raised x 2   room near unit station   medications reviewed   instructed to call staff for mobility     Problem: Skin Injury Risk Increased  Goal: Skin Health and Integrity  Outcome: Ongoing, Progressing  Intervention: Optimize Skin Protection  Flowsheets (Taken 5/17/2023 1633)  Pressure Reduction Techniques:   frequent weight shift encouraged   weight shift assistance provided  Skin Protection:   adhesive use limited   tubing/devices free from skin contact  Head of Bed (HOB) Positioning: HOB at 30-45 degrees     Problem: Diabetes Comorbidity  Goal: Blood Glucose Level Within Targeted Range  Outcome: Ongoing, Progressing  Intervention: Monitor and Manage Glycemia  Flowsheets (Taken 5/17/2023 1633)  Glycemic Management: blood glucose monitored     Problem: Adjustment to Illness (Sepsis/Septic Shock)  Goal: Optimal Coping  Outcome: Ongoing, Progressing     Problem: Bleeding (Sepsis/Septic Shock)  Goal: Absence of Bleeding  Outcome: Ongoing, Progressing     Problem: Glycemic Control Impaired (Sepsis/Septic Shock)  Goal: Blood Glucose Level Within Desired Range  Outcome: Ongoing, Progressing     Problem: Infection Progression (Sepsis/Septic Shock)  Goal: Absence of Infection Signs and Symptoms  Outcome: Ongoing, Progressing     Problem: Nutrition Impaired (Sepsis/Septic Shock)  Goal: Optimal Nutrition Intake  Outcome: Ongoing, Progressing     Problem: Fluid and Electrolyte Imbalance (Acute Kidney Injury/Impairment)  Goal: Fluid and Electrolyte Balance  Outcome: Ongoing, Progressing     Problem: Oral Intake Inadequate (Acute Kidney  Injury/Impairment)  Goal: Optimal Nutrition Intake  Outcome: Ongoing, Progressing     Problem: Renal Function Impairment (Acute Kidney Injury/Impairment)  Goal: Effective Renal Function  Outcome: Ongoing, Progressing     Problem: Infection  Goal: Absence of Infection Signs and Symptoms  Outcome: Ongoing, Progressing  Intervention: Prevent or Manage Infection  Flowsheets (Taken 5/17/2023 6157)  Infection Management: aseptic technique maintained   Pt alert able to make needs known,kristen meds well,no s/s adverse reaction noted,reposition self q 2hrs,pain controlled by prn pain medication,POC explained,remains free from falls and pressure injuries,safety maintained,continue monitoring.

## 2023-05-17 NOTE — NURSING
[facility] [facility] [place] 4 [place]      5/16/2023       Skin assessed on: Admit      [x] No Pressure Injuries Present    []Prevention Measures Documented    [] Yes LDA  for Pressure Injury Previously documented     [] Yes New Pressure Injury Discovered   [] LDA for New Pressure Injury Added      Attending RN:  Ayla Mccoy LPN     Second RN:  Ramses RICHARDS

## 2023-05-17 NOTE — CONSULTS
[facility] Consultation Note    Patient Complaint: diarrhea, abnormal labs    PCP:   Juan F Garay       LOS: 1        Initial History of Present Illness (HPI):  This is a 82 y.o. female consulted to GI service for kael red loose stool. 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    at bedside with history. Patient and  reports acute onset of severe watery diarrhea with associated symptoms of blood in stool that began last night for 5 episodes.  reports stool more red than prior black stool episodes. Denies dizziness, weakness, n/v, abdominal pain, black stools or constipation. Denies smoking, drinking, NSAIDs and oac. Had prior upper endoscopy after c/o of anemia and black stool with findings of angiectasias treated with apc.  reports colonoscopy done ~5 yrs ago with polyp removal.    Admit labs hgb 7.1, k 2.1, trop 0.086            Medical History:  has a past medical history of CHF (congestive heart failure), Diabetes mellitus, DVT (deep venous thrombosis), Hypertension, and Miscarriage.    Surgical History:  has a past surgical history that includes Hysterectomy;  femoral vascular surgery ; Creation of iliofemoral artery bypass (Right, 3/20/2019); Incision and drainage (Right, 4/12/2019); Incision and drainage of groin (Right, 5/3/2019); Angiography of lower extremity (Right, 2/23/2023); Creation of femoral-femoral artery bypass with graft (Bilateral, 3/2/2023); Foot amputation through metatarsal (Right, 3/4/2023); Esophagogastroduodenoscopy (N/A, 3/17/2023); Wound debridement (Left, 3/31/2023); Creation of muscle rotational flap (Left, 3/31/2023); Angiography of lower extremity (Right, 4/4/2023);  Esophagogastroduodenoscopy (N/A, 4/11/2023); Esophagogastroduodenoscopy (N/A, 4/26/2023); and Esophagogastroduodenoscopy (N/A, 5/2/2023).      Objective Findings:    Vital Signs:  Temp:  [97.6 °F (36.4 °C)-98.5 °F (36.9 °C)]   Pulse:  [65-80]   Resp:  [16-23]   BP: (139-205)/()   SpO2:  [95 %-100 %]   Body mass index is 24.46 kg/m².      Physical Exam  Vitals and nursing note reviewed.   Constitutional:       Appearance: Normal appearance.   HENT:      Head: Normocephalic.   Pulmonary:      Effort: Pulmonary effort is normal.   Abdominal:      General: Bowel sounds are normal.      Palpations: Abdomen is soft.   Skin:     General: Skin is warm and dry.   Neurological:      Mental Status: She is alert and oriented to person, place, and time.   Psychiatric:         Mood and Affect: Mood normal.         Behavior: Behavior normal.         Thought Content: Thought content normal.         Judgment: Judgment normal.             Labs:  Lab Results   Component Value Date    WBC 8.37 05/17/2023    HGB 6.2 (L) 05/17/2023    HCT 20.0 (L) 05/17/2023     05/17/2023    CHOL 109 (L) 02/28/2023    TRIG 58 02/28/2023    HDL 38 (L) 02/28/2023    ALT 6 (L) 05/17/2023    AST 18 05/17/2023     (H) 05/17/2023    K 3.3 (L) 05/17/2023    CL 98 05/17/2023    CREATININE 0.9 05/17/2023    BUN 12 05/17/2023    CO2 37 (H) 05/17/2023    TSH 1.304 04/22/2023    INR 1.2 04/04/2023    HGBA1C 8.2 (H) 02/28/2023         Endoscopy: 5/2/23 EGD- Normal esophagus.                          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                          Treated with argon plasma coagulation (APC).                          - Scar in the gastric body from prior APC                          treatment.                          - Non-bleeding gastric ulcers with a clean ulcer                          base (Dougie Class III). These are consistent                          with prior APC treatment.                          - A single  bleeding angioectasia in the duodenum.                          Treated with argon plasma coagulation (APC).                          - No specimens collected.     I have independently reviewed and interpreted the imaging above           Acute blood loss anemia. GI bleed. Diarrhea. Angiectasis. Gastric ulcers. Hypokalemia.    Plan/ Recommendations:  1.  Hgb 6.2 today, K improving. Will receive transfusion today for blood. Plan to monitor counts and determine role of dual endoscopy as patient has been scoped from above 3x in 2 months. Will proceed with colonoscopy, dual tbd. Rec clear liquid diet for now. Agree with ppi. Bowel prep this evening. Hold anticoagulants.  Will follow stool studies.     Thank you so much for allowing us to participate in the care of Nelsy Marino . Please contact us if you have any additional questions.    Judy Jonas NP  Gastroenterology  [facility]

## 2023-05-17 NOTE — NURSING
[facility], [facility] [place]  Nurses Note -- 4 Eyes      5/17/2023       Skin assessed on: Q Shift      [] No Pressure Injuries Present    []Prevention Measures Documented    [x] Yes LDA  for Pressure Injury Previously documented     Has LDA for lt and right leg    [] Yes New Pressure Injury Discovered   [] LDA for New Pressure Injury Added      Attending RN:  Yue Horn LPN     Second RN:  STEPHANE Aguila

## 2023-05-17 NOTE — HPI
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,who presented to the ED at the account of his primary physician after her home health nurse sent routine labs during her follow up,serum K was 2.1, she voiced ongoing diarrhea x 5 days,said to be multiple time daily,she's not walking as she's trying to take pressure of her legs per her surgeon, she voiced associated abdominal pain this morning,said to be generalized with nausea but no vomiting reported,she denied fever,chills or rigor,denied increase drainage from her her left hip surgical drain.She has no urinary symptom of dysuria,frequency,urgency,straining at micturition or hematuria,currently on zosyn with the stop day per spouse tomorrow 5/17/2023.  Labs at presentation showed serun K 2.1,Na 146,BUN/Cr 13/1.1, with H/H 7.1/21.8

## 2023-05-17 NOTE — NURSING
Patient still with antibiotic ball hooked up to PICC line. Patient receiving Zosyn IVPB while in hospital. Dr. Rudd notified. New order to disconnect home antibiotic ball.

## 2023-05-17 NOTE — H&P
[facility] Medicine  History & Physical    Patient Name: Nelsy Marino  MRN: [id number]  Patient Class: IP- Inpatient  Admission Date: 5/16/2023  Attending Physician: Hector Kuo MD   Primary Care Provider: [facility]         Patient information was obtained from patient, spouse/SO, past medical records and ER records.     Subjective:     Principal Problem:Hypokalemia    Chief Complaint:   Chief Complaint   Patient presents with    Abnormal Lab     Pt reports her Dr called to inform her of a low K and to come to the Ed. Pt is currently on IV abx via picc line. Labs drawn yesterday by home health. Pt has no complaints.         HPI: 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,who presented to the ED at the account of his primary physician after her home health nurse sent routine labs during her follow up,serum K was 2.1, she voiced ongoing diarrhea x 5 days,said to be multiple time daily,she's not walking as she's trying to take pressure of her legs per her surgeon, she voiced associated abdominal pain this morning,said to be generalized with nausea but no vomiting reported,she denied fever,chills or rigor,denied increase drainage from her her left hip surgical drain.She has no urinary symptom of dysuria,frequency,urgency,straining at micturition or hematuria,currently on zosyn with the stop day per spouse tomorrow 5/17/2023.  Labs at presentation showed serun K 2.1,Na 146,BUN/Cr 13/1.1, with H/H 7.1/21.8      Past Medical History:   Diagnosis Date    CHF (congestive heart failure)     Diabetes mellitus     DVT (deep venous thrombosis)     Hypertension      Miscarriage        Past Surgical History:   Procedure Laterality Date     femoral vascular surgery       ANGIOGRAPHY OF LOWER EXTREMITY Right 2/23/2023    Procedure: Angiogram Extremity Unilateral;  Surgeon: Danny Dunbar MD;  Location: [facility] OR;  Service: Vascular;  Laterality: Right;  RN PHONE PREOP 2/20/2023----CK CONSENT    ANGIOGRAPHY OF LOWER EXTREMITY Right 4/4/2023    Procedure: Angiogram Extremity Unilateral - possible endovascular intervention;  Surgeon: René Simms MD;  [facility];  Service: Vascular;  Laterality: Right;  19.6 mins.  740.81 mGy  73.3399 Gy.cm  58ml dye  local - 1ml    CREATION OF FEMORAL-FEMORAL ARTERY BYPASS WITH GRAFT Bilateral 3/2/2023    Procedure: CREATION, BYPASS, ARTERIAL, FEMORAL TO FEMORAL, USING GRAFT, LEFT FEMORAL ENDARTERECTOMY, LEFT ILIAC ARTERY STENT PLACEMENT, RIGHT LOWER EXTREMITY ANGIOGRAM AND RIGHT SFA  ANGIOPLASTY;  Surgeon: Danny Dunbar MD;  Location: [facility] OR [address] [place] [address];  Service: Vascular;  Laterality: Bilateral;   744.47 MGY  175.36 Gycm  Flouro time 20.5 mins      CREATION OF ILIOFEMORAL ARTERY BYPASS Right 3/20/2019    Procedure: CREATION, BYPASS, ARTERIAL, ILIAC TO FEMORAL, RIGHT LOWER EXTREMITY, RIGHT PROFUNDAPLASTY;  Surgeon: Danny Dunbar MD;  Location: [facility];  Service: Vascular;  Laterality: Right;  11:00AM START PER ANNE  RN PREOP 3/13/2019  ACT'S, CELL SAVER, GRAFTS, BOOK WATER---NEED H/P  CONSENT IN AM------HGBA1C    CREATION OF MUSCLE ROTATIONAL FLAP Left 3/31/2023    Procedure: CREATION, FLAP, MUSCLE ROTATION;  Surgeon: Brandon Smiley MD;  [facility];  Service: Plastics;  Laterality: Left;    ESOPHAGOGASTRODUODENOSCOPY N/A 3/17/2023    Procedure: EGD (ESOPHAGOGASTRODUODENOSCOPY);  Surgeon: Gee Rodriguez MD;  [facility]  Service: Endoscopy;  Laterality: N/A;    ESOPHAGOGASTRODUODENOSCOPY N/A 4/11/2023    Procedure: EGD (ESOPHAGOGASTRODUODENOSCOPY);  Surgeon:  Nazanin Kaiser MD;  Location: [facility] ENDO (2ND FLR);  Service: Endoscopy;  Laterality: N/A;    ESOPHAGOGASTRODUODENOSCOPY N/A 4/26/2023    Procedure: EGD (ESOPHAGOGASTRODUODENOSCOPY);  Surgeon: Gee Rodriguez MD;  Location: [facility] ENDO (2ND FLR);  Service: Endoscopy;  Laterality: N/A;    ESOPHAGOGASTRODUODENOSCOPY N/A 5/2/2023    Procedure: EGD (ESOPHAGOGASTRODUODENOSCOPY);  Surgeon: Cuong Conteh MD;  Location: [facility] ENDO (2ND FLR);  Service: Endoscopy;  Laterality: N/A;    FOOT AMPUTATION THROUGH METATARSAL Right 3/4/2023    Procedure: AMPUTATION, FOOT, TRANSMETATARSAL;  Surgeon: Benjamin Godfrey DPM;  [facility];  Service: Podiatry;  Laterality: Right;    HYSTERECTOMY      ?unsure cervix present    INCISION AND DRAINAGE Right 4/12/2019    Procedure: Incision and Drainage - R groin washout, possible muscle flap;  Surgeon: Danny Dunbar MD;  [facility];  Service: Vascular;  Laterality: Right;  groin washout    INCISION AND DRAINAGE OF GROIN Right 5/3/2019    Procedure: Incision and Drainage R groin;  Surgeon: Danny Dunbar MD;  [facility];  Service: Vascular;  Laterality: Right;    WOUND DEBRIDEMENT Left 3/31/2023    Procedure: DEBRIDEMENT, WOUND;  Surgeon: René Simms MD;  [address]  Service: Vascular;  Laterality: Left;       Review of patient's allergies indicates:   Allergen Reactions    Motrin [ibuprofen] Itching       No current facility-administered medications on file prior to encounter.     Current Outpatient Medications on File Prior to Encounter   Medication Sig    acetaminophen (TYLENOL) 500 MG tablet Take 2 tablets (1,000 mg total) by mouth every 8 (eight) hours.    amLODIPine (NORVASC) 10 MG tablet Take 1 tablet (10 mg total) by mouth once daily.    atorvastatin (LIPITOR) 40 MG tablet Take 1 tablet (40 mg total) by mouth every evening.    BLOOD SUGAR DIAGNOSTIC (TRUE METRIX GLUCOSE TEST STRIP MISC) by  "Misc.(Non-Drug; Combo Route) route.    carvediloL (COREG) 25 MG tablet Take 1 tablet (25 mg total) by mouth 2 (two) times daily.    dextrose 5 % in water (D5W) 5 % PgBk 100 mL with piperacillin-tazobactam 4.5 gram SolR 4.5 g Inject 4.5 g into the vein every 8 (eight) hours.    diphenhydrAMINE (BENADRYL) 25 mg capsule Take 25 mg by mouth daily as needed for Itching.    DULoxetine (CYMBALTA) 30 MG capsule Take 1 capsule (30 mg total) by mouth 2 (two) times daily.    insulin detemir U-100, Levemir, 100 unit/mL (3 mL) SubQ InPn pen Inject 5 Units into the skin every evening.    insulin syringe-needle,dispos. 0.3 mL 30 gauge x 5/16" Syrg by Misc.(Non-Drug; Combo Route) route.    oxyCODONE (ROXICODONE) 5 MG immediate release tablet Take 1 tablet (5 mg total) by mouth every 12 (twelve) hours as needed.    pantoprazole (PROTONIX) 40 MG tablet Take 1 tablet (40 mg total) by mouth once daily.    pen needle, diabetic 32 gauge x 5/32" Ndle Inject 1 each into the skin 4 (four) times daily.    sodium chloride 0.9 % PgBk 100 mL with micafungin 100 mg SolR 100 mg Inject 100 mg into the vein once daily. for 14 days    vitamin D (VITAMIN D3) 1000 units Tab Take 1 tablet (1,000 Units total) by mouth once daily.     Family History       Problem Relation (Age of Onset)    Diabetes Father    Hypertension Father          Tobacco Use    Smoking status: Former     Types: Cigarettes     Quit date:      Years since quittin.4    Smokeless tobacco: Never   Substance and Sexual Activity    Alcohol use: No    Drug use: No    Sexual activity: Never     Review of Systems   Constitutional:  Negative for appetite change, chills, fatigue and fever.   HENT:  Negative for congestion, ear discharge, ear pain and sore throat.    Eyes:  Negative for photophobia, redness and visual disturbance.   Respiratory:  Negative for apnea, cough, chest tightness, shortness of breath and wheezing.    Cardiovascular:  Negative for chest pain, " palpitations and leg swelling.   Gastrointestinal:  Positive for diarrhea. Negative for abdominal distention, abdominal pain, blood in stool, constipation, nausea and vomiting.   Endocrine: Negative for cold intolerance and heat intolerance.   Genitourinary:  Negative for difficulty urinating, dysuria, flank pain, frequency and hematuria.   Musculoskeletal:  Negative for back pain.   Skin:  Negative for rash and wound.   Neurological:  Negative for dizziness, tremors, seizures, syncope, weakness, light-headedness and headaches.   Psychiatric/Behavioral:  Negative for behavioral problems, confusion, hallucinations and suicidal ideas.    Objective:     Vital Signs (Most Recent):  Temp: 98.2 °F (36.8 °C) (05/16/23 2016)  Pulse: 75 (05/16/23 2016)  Resp: 19 (05/16/23 2016)  BP: (!) 150/65 (05/16/23 2016)  SpO2: 96 % (05/16/23 2016) Vital Signs (24h Range):  Temp:  [98.2 °F (36.8 °C)-98.5 °F (36.9 °C)] 98.2 °F (36.8 °C)  Pulse:  [65-78] 75  Resp:  [16-23] 19  SpO2:  [96 %-100 %] 96 %  BP: (139-205)/() 150/65     Weight: 66.7 kg (147 lb)  Body mass index is 24.46 kg/m².     Physical Exam  Constitutional:       Appearance: She is normal weight.   HENT:      Head: Normocephalic and atraumatic.      Nose: Nose normal. No congestion or rhinorrhea.      Mouth/Throat:      Mouth: Mucous membranes are moist.   Eyes:      Extraocular Movements: Extraocular movements intact.      Conjunctiva/sclera: Conjunctivae normal.      Pupils: Pupils are equal, round, and reactive to light.   Cardiovascular:      Rate and Rhythm: Normal rate and regular rhythm.      Pulses: Normal pulses.   Pulmonary:      Effort: Pulmonary effort is normal. No respiratory distress.      Breath sounds: Normal breath sounds. No wheezing.   Chest:      Chest wall: No tenderness.   Abdominal:      General: Abdomen is flat. Bowel sounds are normal. There is no distension.      Palpations: Abdomen is soft.      Tenderness: There is no abdominal tenderness.  There is no right CVA tenderness, left CVA tenderness, guarding or rebound.   Musculoskeletal:         General: Tenderness and deformity (s/p right foot ray amputation,clean and dry dressing, left hip wound drain) present. No swelling. Normal range of motion.      Cervical back: Normal range of motion and neck supple.      Right lower leg: No edema.      Left lower leg: No edema.   Skin:     General: Skin is warm and dry.   Neurological:      General: No focal deficit present.      Mental Status: She is alert and oriented to person, place, and time. Mental status is at baseline.      Cranial Nerves: No cranial nerve deficit.      Sensory: No sensory deficit.   Psychiatric:         Mood and Affect: Mood normal.         Behavior: Behavior normal.         Thought Content: Thought content normal.         Judgment: Judgment normal.            CRANIAL NERVES     CN III, IV, VI   Pupils are equal, round, and reactive to light.     Significant Labs: All pertinent labs within the past 24 hours have been reviewed.  A1C:   Recent Labs   Lab 02/28/23  0549   HGBA1C 8.2*     CBC:   Recent Labs   Lab 05/16/23  1410   WBC 8.87   HGB 7.1*   HCT 21.8*        CMP:   Recent Labs   Lab 05/16/23  1410   *   K 2.1*      CO2 32*   *   BUN 13   CREATININE 1.1   CALCIUM 7.9*   PROT 6.9   ALBUMIN 2.3*   BILITOT 0.4   ALKPHOS 47*   AST 18   ALT 6*   ANIONGAP 14     Cardiac Markers:   Recent Labs   Lab 05/16/23  1410   *     Lactic Acid: No results for input(s): LACTATE in the last 48 hours.  Lipase: No results for input(s): LIPASE in the last 48 hours.  Magnesium:   Recent Labs   Lab 05/16/23  1410   MG 1.0*     POCT Glucose:   Recent Labs   Lab 05/16/23  1255   POCTGLUCOSE 134*     Troponin:   Recent Labs   Lab 05/16/23  1410   TROPONINI 0.086*     TSH:   Recent Labs   Lab 04/22/23  1619   TSH 1.304       Significant Imaging: I have reviewed all pertinent imaging results/findings within the past 24  hours..  Imaging Results              X-Ray Chest AP Portable (Final result)  Result time 05/16/23 14:57:55      Final result by Clovis Gomez MD (05/16/23 14:57:55)                   Impression:      Right upper extremity PICC line with tip projecting over the superior vena cava.    Coarsened interstitial markings within the lungs, nonspecific.  No focal pulmonary consolidation is evident.      Electronically signed by: Clovis Gomez MD  Date:    05/16/2023  Time:    14:57               Narrative:    EXAMINATION:  XR CHEST AP PORTABLE    CLINICAL HISTORY:  Chest Pain;    TECHNIQUE:  Single frontal view of the chest was performed.    COMPARISON:  04/20/2023.    FINDINGS:  The patient is rotated.  There is a right upper extremity PICC line with tip projecting over the superior vena cava.  The heart and mediastinal structures are within normal limits in size and unchanged.  There are coarsened interstitial markings within the lungs.  No focal pulmonary consolidation is evident.  There is no evidence for pneumothorax or large pleural effusions.  Bony structures appear intact.                                        Assessment/Plan:     * Hypokalemia  Patient sent here by PCP for hypokalemia noticed on routine blood work  She has been on chronic antibiotics for suppression therapy following femoral-femoral bilateral artery bypass with graft  Currently on zosyn,but has been having diarrhea for the past 4 days  Hypokalemia likely due to GI loss  Will replete K and work up cause of diarrhea.      Hypomagnesemia  Serum Mg is low likely related to ongoing diarrhea  Will replete serum Mg      Diarrhea of presumed infectious origin  Patient on zosyn possibly for suppression therapy  Post femoral-femoral artery by pass with graft  Voiced mild abdominal pain,wbc within normal limit  No use of laxatives resently  Will r/o C.diff at this time  Long hospital stay,recently discharged      Vitamin D deficiency  History of Vitamin D  def  Lat vitamin 25 -oh was 20 (4/24/23)  Continue vitamin D supplementation,50,000 units weekly of ergocalciferol x 12 weeks      Hypernatremia  Serum Na of 146  Likely related to reduced intake in the setting of ongoing diarrhea  Free water deficit of 1.3 liters, will correct over the next 24 hours with 5% dextrose water  Will monitor BMP 2 12 hours x 2    History of DVT (deep vein thrombosis)  History of DVT  But off Eliquis due to significant GI bleed  Will continue to monitor  Consider dvt prophylaxis at this time,while inpatient and barely moving.      Diastolic CHF, chronic  Patient is identified as having Diastolic (HFpEF) heart failure that is Chronic. CHF is currently controlled. Latest ECHO performed and demonstrates- Results for orders placed during the hospital encounter of 02/27/23    Echo    Interpretation Summary  · The estimated ejection fraction is 65%.  · The left ventricle is normal in size with normal systolic function.  · Grade II left ventricular diastolic dysfunction.  · Normal right ventricular size with normal right ventricular systolic function.  · Mild left atrial enlargement.  · Mild-to-moderate mitral regurgitation.  · There is pulmonary hypertension.  · The estimated PA systolic pressure is 55 mmHg.  · Intermediate central venous pressure (8 mmHg).  . Continue Beta Blocker and Furosemide and monitor clinical status closely. Monitor on telemetry. Patient is off CHF pathway.  Monitor strict Is&Os and daily weights.  Place on fluid restriction of 1.5 L. Continue to stress to patient importance of self efficacy and  on diet for CHF. Last BNP reviewed- and noted below   Recent Labs   Lab 05/16/23  1410   *   .  Not in heart failure exacerbation at this time.no orthopnea,PND or pedal swelling  No elevation in JVD    Type 2 diabetes mellitus with chronic kidney disease, with long-term current use of insulin  Type 2 DM with hyperglycemia  Start basal,bolus and correctional  insulin  Patient's FSGs are uncontrolled due to hyperglycemia on current medication regimen.  Last A1c reviewed-   Lab Results   Component Value Date    HGBA1C 8.2 (H) 02/28/2023     Most recent fingerstick glucose reviewed-   Recent Labs   Lab 05/16/23  1255   POCTGLUCOSE 134*     Current correctional scale  Medium  Maintain anti-hyperglycemic dose as follows-   Antihyperglycemics (From admission, onward)    Start     Stop Route Frequency Ordered    05/16/23 2100  insulin detemir U-100 (Levemir) pen 4 Units         -- SubQ Nightly 05/16/23 1604        Hold Oral hypoglycemics while patient is in the hospital.    Essential hypertension  Sub optimal BP control  /65 mmhg  Continue home antihypertensive - amlodipine and carvedilol  Continue lifestyle modification  Low salt diet.      PAD (peripheral artery disease)  History of peripheral artery disease s/p femora-femoral bilateral artery bypass with graft and  Right Iliofemoral artery bypass.  With right foot ray amputation for ? osteomyelitis  Continue statin  She could not take eliquis due to previous GI bleed  Will continue antibiotics  Vascular consult to review while in patient        VTE Risk Mitigation (From admission, onward)         Ordered     IP VTE HIGH RISK PATIENT  Once         05/16/23 1604     Place sequential compression device  Until discontinued         05/16/23 1604                   Patient admitted 5/16/2023 to inpatient hospital medicine service under my care      Fabian Rudd MD  Department of Hospital Medicine  [facility]

## 2023-05-17 NOTE — SUBJECTIVE & OBJECTIVE
Interval History: No new issues     Review of Systems   Constitutional:  Negative for activity change.   HENT:  Negative for congestion.    Respiratory:  Negative for chest tightness.    Genitourinary:  Negative for difficulty urinating.   Neurological:  Negative for dizziness.   Objective:     Vital Signs (Most Recent):  Temp: 97.8 °F (36.6 °C) (05/17/23 0822)  Pulse: 79 (05/17/23 0822)  Resp: 19 (05/17/23 0822)  BP: (!) 171/72 (05/17/23 0822)  SpO2: 98 % (05/17/23 0822) Vital Signs (24h Range):  Temp:  [97.6 °F (36.4 °C)-98.5 °F (36.9 °C)] 97.8 °F (36.6 °C)  Pulse:  [65-80] 79  Resp:  [16-23] 19  SpO2:  [95 %-100 %] 98 %  BP: (139-205)/() 171/72     Weight: 66.7 kg (147 lb)  Body mass index is 24.46 kg/m².    Intake/Output Summary (Last 24 hours) at 5/17/2023 0902  Last data filed at 5/16/2023 1900  Gross per 24 hour   Intake 348.81 ml   Output --   Net 348.81 ml         Physical Exam  Constitutional:       Appearance: She is obese.   Cardiovascular:      Rate and Rhythm: Normal rate and regular rhythm.   Pulmonary:      Effort: No respiratory distress.   Skin:     General: Skin is dry.   Neurological:      Mental Status: She is alert and oriented to person, place, and time.           Significant Labs: All pertinent labs within the past 24 hours have been reviewed.  BMP:   Recent Labs   Lab 05/17/23  0601   *   *   K 3.3*   CL 98   CO2 37*   BUN 12   CREATININE 0.9   CALCIUM 7.7*   MG 2.0     CBC:   Recent Labs   Lab 05/16/23  1410 05/17/23  0601   WBC 8.87 8.37   HGB 7.1* 6.2*   HCT 21.8* 20.0*    295       Significant Imaging:

## 2023-05-17 NOTE — SUBJECTIVE & OBJECTIVE
Past Medical History:   Diagnosis Date    CHF (congestive heart failure)     Diabetes mellitus     DVT (deep venous thrombosis)     Hypertension     Miscarriage        Past Surgical History:   Procedure Laterality Date     femoral vascular surgery       ANGIOGRAPHY OF LOWER EXTREMITY Right 2/23/2023    Procedure: Angiogram Extremity Unilateral;  Surgeon: Danny Dunbar MD;  Location: [facility];  Service: Vascular;  Laterality: Right;  RN PHONE PREOP 2/20/2023----CK CONSENT    ANGIOGRAPHY OF LOWER EXTREMITY Right 4/4/2023    Procedure: Angiogram Extremity Unilateral - possible endovascular intervention;  Surgeon: René Simms MD;  Location: [facility] OR 2ND FLR;  Service: Vascular;  Laterality: Right;  19.6 mins.  740.81 mGy  73.3399 Gy.cm  58ml dye  local - 1ml    CREATION OF FEMORAL-FEMORAL ARTERY BYPASS WITH GRAFT Bilateral 3/2/2023    Procedure: CREATION, BYPASS, ARTERIAL, FEMORAL TO FEMORAL, USING GRAFT, LEFT FEMORAL ENDARTERECTOMY, LEFT ILIAC ARTERY STENT PLACEMENT, RIGHT LOWER EXTREMITY ANGIOGRAM AND RIGHT SFA  ANGIOPLASTY;  Surgeon: Danny Dunbar MD;  [facility];  Service: Vascular;  Laterality: Bilateral;   744.47 MGY  175.36 Gycm  Flouro time 20.5 mins      CREATION OF ILIOFEMORAL ARTERY BYPASS Right 3/20/2019    Procedure: CREATION, BYPASS, ARTERIAL, ILIAC TO FEMORAL, RIGHT LOWER EXTREMITY, RIGHT PROFUNDAPLASTY;  Surgeon: Danny Dunbar MD;  Location: [facility];  Service: Vascular;  Laterality: Right;  11:00AM START PER ANNE  RN PREOP 3/13/2019  ACT'S, CELL SAVER, GRAFTS, BOOK WATER---NEED H/P  CONSENT IN AM------HGBA1C    CREATION OF MUSCLE ROTATIONAL FLAP Left 3/31/2023    Procedure: CREATION, FLAP, MUSCLE ROTATION;  Surgeon: Brandon Smiley MD;  Location: [facility] OR 2ND FLR;  Service: Plastics;  Laterality: Left;    ESOPHAGOGASTRODUODENOSCOPY N/A 3/17/2023    Procedure: EGD (ESOPHAGOGASTRODUODENOSCOPY);  Surgeon: Gee Rodriguez MD;  Location: [facility] ENDO (2ND FLR);   Service: Endoscopy;  Laterality: N/A;    ESOPHAGOGASTRODUODENOSCOPY N/A 4/11/2023    Procedure: EGD (ESOPHAGOGASTRODUODENOSCOPY);  Surgeon: Nazanin Kaiser MD;  Location: [facility] ENDO (2ND FLR);  Service: Endoscopy;  Laterality: N/A;    ESOPHAGOGASTRODUODENOSCOPY N/A 4/26/2023    Procedure: EGD (ESOPHAGOGASTRODUODENOSCOPY);  Surgeon: Gee Rodriguez MD;  Location: [facility] ENDO (2ND FLR);  Service: Endoscopy;  Laterality: N/A;    ESOPHAGOGASTRODUODENOSCOPY N/A 5/2/2023    Procedure: EGD (ESOPHAGOGASTRODUODENOSCOPY);  Surgeon: Cuong Conteh MD;  Location: [facility] ENDO (2ND FLR);  Service: Endoscopy;  Laterality: N/A;    FOOT AMPUTATION THROUGH METATARSAL Right 3/4/2023    Procedure: AMPUTATION, FOOT, TRANSMETATARSAL;  Surgeon: Benjamin Godfrey DPM;  [address]  Service: Podiatry;  Laterality: Right;    HYSTERECTOMY      ?unsure cervix present    INCISION AND DRAINAGE Right 4/12/2019    Procedure: Incision and Drainage - R groin washout, possible muscle flap;  Surgeon: Danny Dunbar MD;  [address];  Service: Vascular;  Laterality: Right;  groin washout    INCISION AND DRAINAGE OF GROIN Right 5/3/2019    Procedure: Incision and Drainage R groin;  Surgeon: Danny Dunbar MD;  Location: [address];  Service: Vascular;  Laterality: Right;    WOUND DEBRIDEMENT Left 3/31/2023    Procedure: DEBRIDEMENT, WOUND;  Surgeon: René Simms MD;  [address];  Service: Vascular;  Laterality: Left;       Review of patient's allergies indicates:   Allergen Reactions    Motrin [ibuprofen] Itching       No current facility-administered medications on file prior to encounter.     Current Outpatient Medications on File Prior to Encounter   Medication Sig    acetaminophen (TYLENOL) 500 MG tablet Take 2 tablets (1,000 mg total) by mouth every 8 (eight) hours.    amLODIPine (NORVASC) 10 MG tablet Take 1 tablet (10 mg total) by mouth once daily.    atorvastatin (LIPITOR) 40 MG  "tablet Take 1 tablet (40 mg total) by mouth every evening.    BLOOD SUGAR DIAGNOSTIC (TRUE METRIX GLUCOSE TEST STRIP MISC) by Misc.(Non-Drug; Combo Route) route.    carvediloL (COREG) 25 MG tablet Take 1 tablet (25 mg total) by mouth 2 (two) times daily.    dextrose 5 % in water (D5W) 5 % PgBk 100 mL with piperacillin-tazobactam 4.5 gram SolR 4.5 g Inject 4.5 g into the vein every 8 (eight) hours.    diphenhydrAMINE (BENADRYL) 25 mg capsule Take 25 mg by mouth daily as needed for Itching.    DULoxetine (CYMBALTA) 30 MG capsule Take 1 capsule (30 mg total) by mouth 2 (two) times daily.    insulin detemir U-100, Levemir, 100 unit/mL (3 mL) SubQ InPn pen Inject 5 Units into the skin every evening.    insulin syringe-needle,dispos. 0.3 mL 30 gauge x 5/16" Syrg by Misc.(Non-Drug; Combo Route) route.    oxyCODONE (ROXICODONE) 5 MG immediate release tablet Take 1 tablet (5 mg total) by mouth every 12 (twelve) hours as needed.    pantoprazole (PROTONIX) 40 MG tablet Take 1 tablet (40 mg total) by mouth once daily.    pen needle, diabetic 32 gauge x 5/32" Ndle Inject 1 each into the skin 4 (four) times daily.    sodium chloride 0.9 % PgBk 100 mL with micafungin 100 mg SolR 100 mg Inject 100 mg into the vein once daily. for 14 days    vitamin D (VITAMIN D3) 1000 units Tab Take 1 tablet (1,000 Units total) by mouth once daily.     Family History       Problem Relation (Age of Onset)    Diabetes Father    Hypertension Father          Tobacco Use    Smoking status: Former     Types: Cigarettes     Quit date: 1980     Years since quittin.4    Smokeless tobacco: Never   Substance and Sexual Activity    Alcohol use: No    Drug use: No    Sexual activity: Never     Review of Systems   Constitutional:  Negative for appetite change, chills, fatigue and fever.   HENT:  Negative for congestion, ear discharge, ear pain and sore throat.    Eyes:  Negative for photophobia, redness and visual disturbance.   Respiratory:  Negative for " apnea, cough, chest tightness, shortness of breath and wheezing.    Cardiovascular:  Negative for chest pain, palpitations and leg swelling.   Gastrointestinal:  Positive for diarrhea. Negative for abdominal distention, abdominal pain, blood in stool, constipation, nausea and vomiting.   Endocrine: Negative for cold intolerance and heat intolerance.   Genitourinary:  Negative for difficulty urinating, dysuria, flank pain, frequency and hematuria.   Musculoskeletal:  Negative for back pain.   Skin:  Negative for rash and wound.   Neurological:  Negative for dizziness, tremors, seizures, syncope, weakness, light-headedness and headaches.   Psychiatric/Behavioral:  Negative for behavioral problems, confusion, hallucinations and suicidal ideas.    Objective:     Vital Signs (Most Recent):  Temp: 98.2 °F (36.8 °C) (05/16/23 2016)  Pulse: 75 (05/16/23 2016)  Resp: 19 (05/16/23 2016)  BP: (!) 150/65 (05/16/23 2016)  SpO2: 96 % (05/16/23 2016) Vital Signs (24h Range):  Temp:  [98.2 °F (36.8 °C)-98.5 °F (36.9 °C)] 98.2 °F (36.8 °C)  Pulse:  [65-78] 75  Resp:  [16-23] 19  SpO2:  [96 %-100 %] 96 %  BP: (139-205)/() 150/65     Weight: 66.7 kg (147 lb)  Body mass index is 24.46 kg/m².     Physical Exam  Constitutional:       Appearance: She is normal weight.   HENT:      Head: Normocephalic and atraumatic.      Nose: Nose normal. No congestion or rhinorrhea.      Mouth/Throat:      Mouth: Mucous membranes are moist.   Eyes:      Extraocular Movements: Extraocular movements intact.      Conjunctiva/sclera: Conjunctivae normal.      Pupils: Pupils are equal, round, and reactive to light.   Cardiovascular:      Rate and Rhythm: Normal rate and regular rhythm.      Pulses: Normal pulses.   Pulmonary:      Effort: Pulmonary effort is normal. No respiratory distress.      Breath sounds: Normal breath sounds. No wheezing.   Chest:      Chest wall: No tenderness.   Abdominal:      General: Abdomen is flat. Bowel sounds are normal.  There is no distension.      Palpations: Abdomen is soft.      Tenderness: There is no abdominal tenderness. There is no right CVA tenderness, left CVA tenderness, guarding or rebound.   Musculoskeletal:         General: Tenderness and deformity (s/p right foot ray amputation,clean and dry dressing, left hip wound drain) present. No swelling. Normal range of motion.      Cervical back: Normal range of motion and neck supple.      Right lower leg: No edema.      Left lower leg: No edema.   Skin:     General: Skin is warm and dry.   Neurological:      General: No focal deficit present.      Mental Status: She is alert and oriented to person, place, and time. Mental status is at baseline.      Cranial Nerves: No cranial nerve deficit.      Sensory: No sensory deficit.   Psychiatric:         Mood and Affect: Mood normal.         Behavior: Behavior normal.         Thought Content: Thought content normal.         Judgment: Judgment normal.            CRANIAL NERVES     CN III, IV, VI   Pupils are equal, round, and reactive to light.     Significant Labs: All pertinent labs within the past 24 hours have been reviewed.  A1C:   Recent Labs   Lab 02/28/23  0549   HGBA1C 8.2*     CBC:   Recent Labs   Lab 05/16/23  1410   WBC 8.87   HGB 7.1*   HCT 21.8*        CMP:   Recent Labs   Lab 05/16/23  1410   *   K 2.1*      CO2 32*   *   BUN 13   CREATININE 1.1   CALCIUM 7.9*   PROT 6.9   ALBUMIN 2.3*   BILITOT 0.4   ALKPHOS 47*   AST 18   ALT 6*   ANIONGAP 14     Cardiac Markers:   Recent Labs   Lab 05/16/23  1410   *     Lactic Acid: No results for input(s): LACTATE in the last 48 hours.  Lipase: No results for input(s): LIPASE in the last 48 hours.  Magnesium:   Recent Labs   Lab 05/16/23  1410   MG 1.0*     POCT Glucose:   Recent Labs   Lab 05/16/23  1255   POCTGLUCOSE 134*     Troponin:   Recent Labs   Lab 05/16/23  1410   TROPONINI 0.086*     TSH:   Recent Labs   Lab 04/22/23  1619   TSH 1.304        Significant Imaging: I have reviewed all pertinent imaging results/findings within the past 24 hours..  Imaging Results              X-Ray Chest AP Portable (Final result)  Result time 05/16/23 14:57:55      Final result by Clovis Gomez MD (05/16/23 14:57:55)                   Impression:      Right upper extremity PICC line with tip projecting over the superior vena cava.    Coarsened interstitial markings within the lungs, nonspecific.  No focal pulmonary consolidation is evident.      Electronically signed by: Clovis Gomez MD  Date:    05/16/2023  Time:    14:57               Narrative:    EXAMINATION:  XR CHEST AP PORTABLE    CLINICAL HISTORY:  Chest Pain;    TECHNIQUE:  Single frontal view of the chest was performed.    COMPARISON:  04/20/2023.    FINDINGS:  The patient is rotated.  There is a right upper extremity PICC line with tip projecting over the superior vena cava.  The heart and mediastinal structures are within normal limits in size and unchanged.  There are coarsened interstitial markings within the lungs.  No focal pulmonary consolidation is evident.  There is no evidence for pneumothorax or large pleural effusions.  Bony structures appear intact.

## 2023-05-17 NOTE — HOSPITAL COURSE
Internal Medicine Patient admitted to the hospital for hypokalemia- found on routing labs at home with H/H. K replaced. Noted to have a GI bleed. Transfused one unit of blood on 5/17. GI consulted. Hypo K likely from diarrhea. C-diff sent- negative. 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 C-scope showed hemorrhage in ascending colon- treated. H/H remained stable. PT/OT consulted- and recommended SNF.  consulted. PPD placed.  Hgb stable. Pt dc in stable condition.    Hospitalist Internal Medicine Hospitalist Internal Medicine Infectious Disease

## 2023-05-17 NOTE — PROGRESS NOTES
[facility] Medicine  Progress Note    Patient Name: Nelsy Marino  MRN: [id number]  Patient Class: IP- Inpatient   Admission Date: 5/16/2023  Length of Stay: 1 days  Attending Physician: Jacobo Phillips MD  Primary Care Provider: Juan F Garay        Subjective:     Principal Problem:Hypokalemia        HPI:  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,who presented to the ED at the account of his primary physician after her home health nurse sent routine labs during her follow up,serum K was 2.1, she voiced ongoing diarrhea x 5 days,said to be multiple time daily,she's not walking as she's trying to take pressure of her legs per her surgeon, she voiced associated abdominal pain this morning,said to be generalized with nausea but no vomiting reported,she denied fever,chills or rigor,denied increase drainage from her her left hip surgical drain.She has no urinary symptom of dysuria,frequency,urgency,straining at micturition or hematuria,currently on zosyn with the stop day per spouse tomorrow 5/17/2023.  Labs at presentation showed serun K 2.1,Na 146,BUN/Cr 13/1.1, with H/H 7.1/21.8      Overview/Hospital Course:  Patient admitted to the hospital for hypokalemia- found on routing labs at home with H/H. K replaced. Noted to have a GI bleed. Transfused one unit of blood on 5/17. GI consulted. Hypo K likely from diarrhea. C-diff sent.      Interval History: No new issues     Review of Systems   Constitutional:  Negative for activity change.   HENT:  Negative for congestion.    Respiratory:  Negative for chest tightness.    Genitourinary:  Negative for difficulty  urinating.   Neurological:  Negative for dizziness.   Objective:     Vital Signs (Most Recent):  Temp: 97.8 °F (36.6 °C) (05/17/23 0822)  Pulse: 79 (05/17/23 0822)  Resp: 19 (05/17/23 0822)  BP: (!) 171/72 (05/17/23 0822)  SpO2: 98 % (05/17/23 0822) Vital Signs (24h Range):  Temp:  [97.6 °F (36.4 °C)-98.5 °F (36.9 °C)] 97.8 °F (36.6 °C)  Pulse:  [65-80] 79  Resp:  [16-23] 19  SpO2:  [95 %-100 %] 98 %  BP: (139-205)/() 171/72     Weight: 66.7 kg (147 lb)  Body mass index is 24.46 kg/m².    Intake/Output Summary (Last 24 hours) at 5/17/2023 0902  Last data filed at 5/16/2023 1900  Gross per 24 hour   Intake 348.81 ml   Output --   Net 348.81 ml         Physical Exam  Constitutional:       Appearance: She is obese.   Cardiovascular:      Rate and Rhythm: Normal rate and regular rhythm.   Pulmonary:      Effort: No respiratory distress.   Skin:     General: Skin is dry.   Neurological:      Mental Status: She is alert and oriented to person, place, and time.           Significant Labs: All pertinent labs within the past 24 hours have been reviewed.  BMP:   Recent Labs   Lab 05/17/23  0601   *   *   K 3.3*   CL 98   CO2 37*   BUN 12   CREATININE 0.9   CALCIUM 7.7*   MG 2.0     CBC:   Recent Labs   Lab 05/16/23  1410 05/17/23  0601   WBC 8.87 8.37   HGB 7.1* 6.2*   HCT 21.8* 20.0*    295       Significant Imaging:       Assessment/Plan:      * Hypokalemia  Patient sent here by PCP for hypokalemia noticed on routine blood work  She has been on chronic antibiotics for suppression therapy following femoral-femoral bilateral artery bypass with graft  Currently on zosyn,but has been having diarrhea for the past 4 days  Hypokalemia likely due to GI loss  Will replete K and work up cause of diarrhea.    Today at 3.3. Will replace further      Hypomagnesemia  Serum Mg is low likely related to ongoing diarrhea  Will replete serum Mg      Diarrhea of presumed infectious origin  Patient on zosyn possibly  for suppression therapy  Post femoral-femoral artery by pass with graft  Voiced mild abdominal pain,wbc within normal limit  No use of laxatives resently  Will r/o C.diff at this time  Long hospital stay,recently discharged      Vitamin D deficiency  History of Vitamin D def  Lat vitamin 25 -oh was 20 (4/24/23)  Continue vitamin D supplementation,50,000 units weekly of ergocalciferol x 12 weeks      Hypernatremia  Serum Na of 146  Likely related to reduced intake in the setting of ongoing diarrhea  Free water deficit of 1.3 liters, will correct over the next 24 hours with 5% dextrose water  Will monitor BMP 2 12 hours x 2    History of DVT (deep vein thrombosis)  History of DVT  But off Eliquis due to significant GI bleed  Will continue to monitor  Consider dvt prophylaxis at this time,while inpatient and barely moving.      Diastolic CHF, chronic  Patient is identified as having Diastolic (HFpEF) heart failure that is Chronic. CHF is currently controlled. Latest ECHO performed and demonstrates- Results for orders placed during the hospital encounter of 02/27/23    Echo    Interpretation Summary  · The estimated ejection fraction is 65%.  · The left ventricle is normal in size with normal systolic function.  · Grade II left ventricular diastolic dysfunction.  · Normal right ventricular size with normal right ventricular systolic function.  · Mild left atrial enlargement.  · Mild-to-moderate mitral regurgitation.  · There is pulmonary hypertension.  · The estimated PA systolic pressure is 55 mmHg.  · Intermediate central venous pressure (8 mmHg).  . Continue Beta Blocker and Furosemide and monitor clinical status closely. Monitor on telemetry. Patient is off CHF pathway.  Monitor strict Is&Os and daily weights.  Place on fluid restriction of 1.5 L. Continue to stress to patient importance of self efficacy and  on diet for CHF. Last BNP reviewed- and noted below   Recent Labs   Lab 05/16/23  1410   *    .  Not in heart failure exacerbation at this time.no orthopnea,PND or pedal swelling  No elevation in JVD    Type 2 diabetes mellitus with chronic kidney disease, with long-term current use of insulin  Type 2 DM with hyperglycemia  Start basal,bolus and correctional insulin  Patient's FSGs are uncontrolled due to hyperglycemia on current medication regimen.  Last A1c reviewed-   Lab Results   Component Value Date    HGBA1C 8.2 (H) 02/28/2023     Most recent fingerstick glucose reviewed-   Recent Labs   Lab 05/16/23  1255   POCTGLUCOSE 134*     Current correctional scale  Medium  Maintain anti-hyperglycemic dose as follows-   Antihyperglycemics (From admission, onward)    Start     Stop Route Frequency Ordered    05/16/23 2100  insulin detemir U-100 (Levemir) pen 4 Units         -- SubQ Nightly 05/16/23 1604        Hold Oral hypoglycemics while patient is in the hospital.    Essential hypertension  Sub optimal BP control  /65 mmhg  Continue home antihypertensive - amlodipine and carvedilol  Continue lifestyle modification  Low salt diet.      PAD (peripheral artery disease)  History of peripheral artery disease s/p femora-femoral bilateral artery bypass with graft and  Right Iliofemoral artery bypass.  With right foot ray amputation for ? osteomyelitis  Continue statin  She could not take eliquis due to previous GI bleed  Will continue antibiotics  Vascular consult to review while in patient      Likely GI bleed. Will transfuse one unit of blood. GI consult. Frequent H/H.  PPI    VTE Risk Mitigation (From admission, onward)         Ordered     IP VTE HIGH RISK PATIENT  Once         05/16/23 1604     Place sequential compression device  Until discontinued         05/16/23 1604                Discharge Planning   JUAN ALBERTO:      Code Status: Full Code   Is the patient medically ready for discharge?:     Reason for patient still in hospital (select all that apply): Patient unstable  Discharge Plan A: Home with family                   Jacobo Norris MD  Department of Hospital Medicine   [facility] Surg

## 2023-05-17 NOTE — PLAN OF CARE
Case Management Assessment     [facility]  Ph[facility]acy: [facility] on [address]    Patient Arrived From: home  Existing Help at Home: Ashwin (daughter)    Barriers to Discharge: none    Discharge Plan:    GAVIOTA Cramer with family   B. Home health       completed initial assessment and discussed discharge planning with patient at her bedside. Patient stated that she lives with her  and her daughter also serves as a source of support for her. Patient shared that she was just discharged from the hospital with  services through [facility] and would like to resume upon discharge. Patient's  will provide transportation for her to get home when discharge from the hospital.      05/16/23 2200   Discharge Assessment   Assessment Type Discharge Planning Assessment   Confirmed/corrected address, phone number and insurance Yes   Confirmed Demographics Correct on Facesheet   Source of Information patient   Communicated JUAN ALBERTO with patient/caregiver Date not available/Unable to determine   Reason For Admission [facility]   People in Home spouse   Do you expect to return to your current living situation? Yes   Do you have help at home or someone to help you manage your care at home? Yes   Who are your caregiver(s) and their phone number(s)? Ashwin Marino (Daughter)   [phone] (Mobile)   Prior to hospitilization cognitive status: Alert/Oriented   Current cognitive status: Alert/Oriented   Equipment Currently Used at Home walker, rolling;cane, straight;bedside commode;hospital bed;wheelchair   Readmission within 30 days? Yes   Patient currently being followed by outpatient case management? No   Do you currently have service(s) that help you manage your care at home? Yes   How Many hours does patient receive services 2   Name and Contact number of agency [facility]   Is the pt/caregiver preference to resume services with current agency Yes   Do you take prescription medications? Yes   Do you have  prescription coverage? Yes   Coverage [employer]   Do you have any problems affording any of your prescribed medications? No   Is the patient taking medications as prescribed? yes   Who is going to help you get home at discharge? Ashwin Marino (Daughter)   [phone] (Mobile)   How do you get to doctors appointments? family or friend will provide   Are you on dialysis? No   Do you take coumadin? No   Discharge Plan A Home with family   Discharge Plan B Home with family   DME Needed Upon Discharge  none   Discharge Plan discussed with: Patient   Transition of Care Barriers None

## 2023-05-18 ENCOUNTER — ANESTHESIA EVENT (OUTPATIENT)
Dept: ENDOSCOPY | Facility: HOSPITAL | Age: 83
DRG: 640 | End: 2023-05-18
Payer: MEDICARE

## 2023-05-18 ENCOUNTER — ANESTHESIA (OUTPATIENT)
Dept: ENDOSCOPY | Facility: HOSPITAL | Age: 83
DRG: 640 | End: 2023-05-18
Payer: MEDICARE

## 2023-05-18 LAB
ALBUMIN SERPL BCP-MCNC: 2.3 G/DL (ref 3.5–5.2)
ALP SERPL-CCNC: 48 U/L (ref 55–135)
ALT SERPL W/O P-5'-P-CCNC: 7 U/L (ref 10–44)
ANION GAP SERPL CALC-SCNC: 13 MMOL/L (ref 8–16)
AST SERPL-CCNC: 17 U/L (ref 10–40)
BILIRUB SERPL-MCNC: 1.5 MG/DL (ref 0.1–1)
BLD PROD TYP BPU: NORMAL
BLOOD UNIT EXPIRATION DATE: NORMAL
BLOOD UNIT TYPE CODE: 7300
BLOOD UNIT TYPE: NORMAL
BUN SERPL-MCNC: 7 MG/DL (ref 8–23)
CALCIUM SERPL-MCNC: 7.9 MG/DL (ref 8.7–10.5)
CHLORIDE SERPL-SCNC: 98 MMOL/L (ref 95–110)
CO2 SERPL-SCNC: 30 MMOL/L (ref 23–29)
CODING SYSTEM: NORMAL
CREAT SERPL-MCNC: 0.8 MG/DL (ref 0.5–1.4)
CROSSMATCH INTERPRETATION: NORMAL
DISPENSE STATUS: NORMAL
EST. GFR  (NO RACE VARIABLE): >60 ML/MIN/1.73 M^2
GLUCOSE SERPL-MCNC: 185 MG/DL (ref 70–110)
HGB BLD-MCNC: 6.9 G/DL (ref 12–16)
HGB BLD-MCNC: 9.3 G/DL (ref 12–16)
HGB BLD-MCNC: 9.5 G/DL (ref 12–16)
MAGNESIUM SERPL-MCNC: 1.5 MG/DL (ref 1.6–2.6)
NUM UNITS TRANS PACKED RBC: NORMAL
POTASSIUM SERPL-SCNC: 3.2 MMOL/L (ref 3.5–5.1)
PROT SERPL-MCNC: 6.2 G/DL (ref 6–8.4)
SODIUM SERPL-SCNC: 141 MMOL/L (ref 136–145)
TROPONIN I SERPL DL<=0.01 NG/ML-MCNC: <0.006 NG/ML (ref 0–0.03)

## 2023-05-18 PROCEDURE — 44369 SMALL BOWEL ENDOSCOPY: CPT | Mod: 51,,, | Performed by: INTERNAL MEDICINE

## 2023-05-18 PROCEDURE — 44369 PR SMALL BOWEL ENDOSCOPY,ABLATE LESN: ICD-10-PCS | Mod: 51,,, | Performed by: INTERNAL MEDICINE

## 2023-05-18 PROCEDURE — 44369 SMALL BOWEL ENDOSCOPY: CPT | Performed by: INTERNAL MEDICINE

## 2023-05-18 PROCEDURE — 88305 TISSUE EXAM BY PATHOLOGIST: CPT | Performed by: PATHOLOGY

## 2023-05-18 PROCEDURE — D9220A PRA ANESTHESIA: ICD-10-PCS | Mod: ANES,,, | Performed by: ANESTHESIOLOGY

## 2023-05-18 PROCEDURE — 37000008 HC ANESTHESIA 1ST 15 MINUTES: Performed by: INTERNAL MEDICINE

## 2023-05-18 PROCEDURE — 25000003 PHARM REV CODE 250: Performed by: INTERNAL MEDICINE

## 2023-05-18 PROCEDURE — 37000009 HC ANESTHESIA EA ADD 15 MINS: Performed by: INTERNAL MEDICINE

## 2023-05-18 PROCEDURE — 36415 COLL VENOUS BLD VENIPUNCTURE: CPT | Performed by: INTERNAL MEDICINE

## 2023-05-18 PROCEDURE — 83735 ASSAY OF MAGNESIUM: CPT | Performed by: STUDENT IN AN ORGANIZED HEALTH CARE EDUCATION/TRAINING PROGRAM

## 2023-05-18 PROCEDURE — D9220A PRA ANESTHESIA: Mod: CRNA,,, | Performed by: NURSE ANESTHETIST, CERTIFIED REGISTERED

## 2023-05-18 PROCEDURE — 45382 COLONOSCOPY W/CONTROL BLEED: CPT | Mod: ,,, | Performed by: INTERNAL MEDICINE

## 2023-05-18 PROCEDURE — D9220A PRA ANESTHESIA: Mod: ANES,,, | Performed by: ANESTHESIOLOGY

## 2023-05-18 PROCEDURE — 45382 COLONOSCOPY W/CONTROL BLEED: CPT | Performed by: INTERNAL MEDICINE

## 2023-05-18 PROCEDURE — 63600175 PHARM REV CODE 636 W HCPCS: Performed by: INTERNAL MEDICINE

## 2023-05-18 PROCEDURE — 84484 ASSAY OF TROPONIN QUANT: CPT | Performed by: INTERNAL MEDICINE

## 2023-05-18 PROCEDURE — 45382 PR COLONOSCOPY,FLEX,W/CONTROL, BLEEDING: ICD-10-PCS | Mod: ,,, | Performed by: INTERNAL MEDICINE

## 2023-05-18 PROCEDURE — D9220A PRA ANESTHESIA: ICD-10-PCS | Mod: CRNA,,, | Performed by: NURSE ANESTHETIST, CERTIFIED REGISTERED

## 2023-05-18 PROCEDURE — 88305 TISSUE EXAM BY PATHOLOGIST: CPT | Mod: 26,,, | Performed by: PATHOLOGY

## 2023-05-18 PROCEDURE — 36415 COLL VENOUS BLD VENIPUNCTURE: CPT | Performed by: STUDENT IN AN ORGANIZED HEALTH CARE EDUCATION/TRAINING PROGRAM

## 2023-05-18 PROCEDURE — 63600175 PHARM REV CODE 636 W HCPCS: Performed by: NURSE ANESTHETIST, CERTIFIED REGISTERED

## 2023-05-18 PROCEDURE — 80053 COMPREHEN METABOLIC PANEL: CPT | Performed by: STUDENT IN AN ORGANIZED HEALTH CARE EDUCATION/TRAINING PROGRAM

## 2023-05-18 PROCEDURE — P9016 RBC LEUKOCYTES REDUCED: HCPCS | Performed by: INTERNAL MEDICINE

## 2023-05-18 PROCEDURE — 25000003 PHARM REV CODE 250: Performed by: NURSE ANESTHETIST, CERTIFIED REGISTERED

## 2023-05-18 PROCEDURE — 27201012 HC FORCEPS, HOT/COLD, DISP: Performed by: INTERNAL MEDICINE

## 2023-05-18 PROCEDURE — 27202087 HC PROBE, APC: Performed by: INTERNAL MEDICINE

## 2023-05-18 PROCEDURE — 36430 TRANSFUSION BLD/BLD COMPNT: CPT

## 2023-05-18 PROCEDURE — 11000001 HC ACUTE MED/SURG PRIVATE ROOM

## 2023-05-18 PROCEDURE — 25000003 PHARM REV CODE 250: Performed by: STUDENT IN AN ORGANIZED HEALTH CARE EDUCATION/TRAINING PROGRAM

## 2023-05-18 PROCEDURE — 88305 TISSUE EXAM BY PATHOLOGIST: ICD-10-PCS | Mod: 26,,, | Performed by: PATHOLOGY

## 2023-05-18 PROCEDURE — 85018 HEMOGLOBIN: CPT | Mod: 91 | Performed by: INTERNAL MEDICINE

## 2023-05-18 RX ORDER — PANTOPRAZOLE SODIUM 40 MG/1
40 TABLET, DELAYED RELEASE ORAL 2 TIMES DAILY
Status: DISCONTINUED | OUTPATIENT
Start: 2023-05-18 | End: 2023-05-22 | Stop reason: HOSPADM

## 2023-05-18 RX ORDER — MAGNESIUM SULFATE HEPTAHYDRATE 40 MG/ML
2 INJECTION, SOLUTION INTRAVENOUS ONCE
Status: COMPLETED | OUTPATIENT
Start: 2023-05-18 | End: 2023-05-18

## 2023-05-18 RX ORDER — PROPOFOL 10 MG/ML
INJECTION, EMULSION INTRAVENOUS
Status: DISPENSED
Start: 2023-05-18 | End: 2023-05-19

## 2023-05-18 RX ORDER — PROPOFOL 10 MG/ML
VIAL (ML) INTRAVENOUS
Status: DISCONTINUED | OUTPATIENT
Start: 2023-05-18 | End: 2023-05-18

## 2023-05-18 RX ORDER — HYDRALAZINE HYDROCHLORIDE 25 MG/1
75 TABLET, FILM COATED ORAL EVERY 8 HOURS
Status: DISCONTINUED | OUTPATIENT
Start: 2023-05-18 | End: 2023-05-22 | Stop reason: HOSPADM

## 2023-05-18 RX ORDER — POTASSIUM CHLORIDE 7.45 MG/ML
10 INJECTION INTRAVENOUS
Status: COMPLETED | OUTPATIENT
Start: 2023-05-18 | End: 2023-05-18

## 2023-05-18 RX ORDER — LIDOCAINE HYDROCHLORIDE 20 MG/ML
INJECTION INTRAVENOUS
Status: DISCONTINUED | OUTPATIENT
Start: 2023-05-18 | End: 2023-05-18

## 2023-05-18 RX ORDER — POTASSIUM CHLORIDE 20 MEQ/1
60 TABLET, EXTENDED RELEASE ORAL ONCE
Status: DISCONTINUED | OUTPATIENT
Start: 2023-05-18 | End: 2023-05-18

## 2023-05-18 RX ORDER — LIDOCAINE HYDROCHLORIDE 20 MG/ML
INJECTION, SOLUTION EPIDURAL; INFILTRATION; INTRACAUDAL; PERINEURAL
Status: DISPENSED
Start: 2023-05-18 | End: 2023-05-19

## 2023-05-18 RX ORDER — SODIUM CHLORIDE 9 MG/ML
INJECTION, SOLUTION INTRAVENOUS CONTINUOUS
Status: DISCONTINUED | OUTPATIENT
Start: 2023-05-18 | End: 2023-05-22

## 2023-05-18 RX ADMIN — POTASSIUM CHLORIDE 10 MEQ: 7.46 INJECTION, SOLUTION INTRAVENOUS at 09:05

## 2023-05-18 RX ADMIN — MUPIROCIN: 20 OINTMENT TOPICAL at 09:05

## 2023-05-18 RX ADMIN — PROPOFOL 20 MG: 10 INJECTION, EMULSION INTRAVENOUS at 01:05

## 2023-05-18 RX ADMIN — MUPIROCIN: 20 OINTMENT TOPICAL at 08:05

## 2023-05-18 RX ADMIN — INSULIN DETEMIR 4 UNITS: 100 INJECTION, SOLUTION SUBCUTANEOUS at 09:05

## 2023-05-18 RX ADMIN — SODIUM CHLORIDE: 9 INJECTION, SOLUTION INTRAVENOUS at 04:05

## 2023-05-18 RX ADMIN — MAGNESIUM SULFATE HEPTAHYDRATE 2 G: 40 INJECTION, SOLUTION INTRAVENOUS at 09:05

## 2023-05-18 RX ADMIN — Medication 6 MG: at 09:05

## 2023-05-18 RX ADMIN — SODIUM CHLORIDE: 9 INJECTION, SOLUTION INTRAVENOUS at 12:05

## 2023-05-18 RX ADMIN — POTASSIUM CHLORIDE 10 MEQ: 7.46 INJECTION, SOLUTION INTRAVENOUS at 10:05

## 2023-05-18 RX ADMIN — PANTOPRAZOLE SODIUM 40 MG: 40 TABLET, DELAYED RELEASE ORAL at 09:05

## 2023-05-18 RX ADMIN — DULOXETINE 30 MG: 30 CAPSULE, DELAYED RELEASE ORAL at 09:05

## 2023-05-18 RX ADMIN — HYDRALAZINE HYDROCHLORIDE 75 MG: 25 TABLET, FILM COATED ORAL at 03:05

## 2023-05-18 RX ADMIN — PANTOPRAZOLE SODIUM 40 MG: 40 TABLET, DELAYED RELEASE ORAL at 08:05

## 2023-05-18 RX ADMIN — CARVEDILOL 25 MG: 12.5 TABLET, FILM COATED ORAL at 08:05

## 2023-05-18 RX ADMIN — ATORVASTATIN CALCIUM 40 MG: 40 TABLET, FILM COATED ORAL at 09:05

## 2023-05-18 RX ADMIN — PROPOFOL 50 MG: 10 INJECTION, EMULSION INTRAVENOUS at 12:05

## 2023-05-18 RX ADMIN — POTASSIUM CHLORIDE 10 MEQ: 7.46 INJECTION, SOLUTION INTRAVENOUS at 04:05

## 2023-05-18 RX ADMIN — CARVEDILOL 25 MG: 12.5 TABLET, FILM COATED ORAL at 04:05

## 2023-05-18 RX ADMIN — AMLODIPINE BESYLATE 10 MG: 5 TABLET ORAL at 08:05

## 2023-05-18 RX ADMIN — POTASSIUM CHLORIDE 10 MEQ: 7.46 INJECTION, SOLUTION INTRAVENOUS at 03:05

## 2023-05-18 RX ADMIN — HYDRALAZINE HYDROCHLORIDE 75 MG: 25 TABLET, FILM COATED ORAL at 09:05

## 2023-05-18 RX ADMIN — ACETAMINOPHEN 650 MG: 325 TABLET ORAL at 11:05

## 2023-05-18 RX ADMIN — HYDRALAZINE HYDROCHLORIDE 50 MG: 25 TABLET, FILM COATED ORAL at 05:05

## 2023-05-18 RX ADMIN — DULOXETINE 30 MG: 30 CAPSULE, DELAYED RELEASE ORAL at 08:05

## 2023-05-18 RX ADMIN — LIDOCAINE HYDROCHLORIDE 100 MG: 20 INJECTION, SOLUTION INTRAVENOUS at 12:05

## 2023-05-18 RX ADMIN — OXYCODONE HYDROCHLORIDE 5 MG: 5 TABLET ORAL at 09:05

## 2023-05-18 RX ADMIN — POTASSIUM CHLORIDE 10 MEQ: 7.46 INJECTION, SOLUTION INTRAVENOUS at 11:05

## 2023-05-18 NOTE — PROVATION PATIENT INSTRUCTIONS
Discharge Summary/Instructions after an Endoscopic Procedure  Patient Name: Nelsy Marino  Patient MRN: [id number]  Patient YOB: 1940  Thursday, May 18, 2023  Cuong Aguiar MD  Dear patient,  As a result of recent federal legislation (The Federal Cures Act), you may   receive lab or pathology results from your procedure in your [facility]   account before your physician is able to contact you. Your physician or   their representative will relay the results to you with their   recommendations at their soonest availability.  Thank you,  RESTRICTIONS:  During your procedure today, you received medications for sedation.  These   medications may affect your judgment, balance and coordination.  Therefore,   for 24 hours, you have the following restrictions:   - DO NOT drive a car, operate machinery, make legal/financial decisions,   sign important papers or drink alcohol.    ACTIVITY:  Today: no heavy lifting, straining or running due to procedural   sedation/anesthesia.  The following day: return to full activity including work.  DIET:  Eat and drink normally unless instructed otherwise.     TREATMENT FOR COMMON SIDE EFFECTS:  - Mild abdominal pain, nausea, belching, bloating or excessive gas:  rest,   eat lightly and use a heating pad.  - Sore Throat: treat with throat lozenges and/or gargle with warm salt   water.  - Because air was used during the procedure, expelling large amounts of air   from your rectum or belching is normal.  - If a bowel prep was taken, you may not have a bowel movement for 1-3 days.    This is normal.  SYMPTOMS TO WATCH FOR AND REPORT TO YOUR PHYSICIAN:  1. Abdominal pain or bloating, other than gas cramps.  2. Chest pain.  3. Back pain.  4. Signs of infection such as: chills or fever occurring within 24 hours   after the procedure.  5. Rectal bleeding, which would show as bright red, maroon, or black stools.   (A tablespoon of blood from the rectum is not serious, especially if    hemorrhoids are present.)  6. Vomiting.  7. Weakness or dizziness.  GO DIRECTLY TO THE NEAREST EMERGENCY ROOM IF YOU HAVE ANY OF THE FOLLOWING:      Difficulty breathing              Chills and/or fever over 101 F   Persistent vomiting and/or vomiting blood   Severe abdominal pain   Severe chest pain   Black, tarry stools   Bleeding- more than one tablespoon   Any other symptom or condition that you feel may need urgent attention  Your doctor recommends these additional instructions:  If any biopsies were taken, your doctors clinic will contact you in 1 to 2   weeks with any results.  - Return patient to hospital [facility] for ongoing care.   - Resume previous diet.   - Continue present medications.   - Await pathology results.   - Avoid replacing fecal management system if able to.  - Repeat colonoscopy for surveillance based on clinical status at that   time.  For questions, problems or results please call your physician - Cuong Aguiar MD at Work:  [phone].  [facility] can be reached at [phone]     IF A COMPLICATION OR EMERGENCY SITUATION ARISES AND YOU ARE UNABLE TO REACH   YOUR PHYSICIAN - GO DIRECTLY TO THE EMERGENCY ROOM.  Cuong Aguiar MD  5/18/2023 1:54:30 PM  This report has been verified and signed electronically.  Dear patient,  As a result of recent federal legislation (The Federal Cures Act), you may   receive lab or pathology results from your procedure in your [facility]   account before your physician is able to contact you. Your physician or   their representative will relay the results to you with their   recommendations at their soonest availability.  Thank you,  PROVATION

## 2023-05-18 NOTE — CONSULTS
[facility] Surg  Wound Care    Patient Name:  Nelsy Marino   MRN:  [id number]  Date: 2023  Diagnosis: Hypokalemia    History:     Past Medical History:   Diagnosis Date    CHF (congestive heart failure)     Diabetes mellitus     DVT (deep venous thrombosis)     Hypertension     Miscarriage        Social History     Socioeconomic History    Marital status:    Tobacco Use    Smoking status: Former     Types: Cigarettes     Quit date:      Years since quittin.4    Smokeless tobacco: Never   Substance and Sexual Activity    Alcohol use: No    Drug use: No    Sexual activity: Never     Social Determinants of Health     Financial Resource Strain: Unknown    Difficulty of Paying Living Expenses: Patient refused   Food Insecurity: No Food Insecurity    Worried About Running Out of Food in the Last Year: Never true    Ran Out of Food in the Last Year: Never true   Transportation Needs: No Transportation Needs    Lack of Transportation (Medical): No    Lack of Transportation (Non-Medical): No   Physical Activity: Inactive    Days of Exercise per Week: 0 days    Minutes of Exercise per Session: 0 min   Stress: Unknown    Feeling of Stress : Patient refused   Social Connections: Unknown    Frequency of Communication with Friends and Family: More than three times a week    Frequency of Social Gatherings with Friends and Family: More than three times a week    Attends Muslim Services: Patient refused    Active Member of Clubs or Organizations: Patient refused    Attends Club or Organization Meetings: Patient refused    Marital Status:    Housing Stability: Low Risk     Unable to Pay for Housing in the Last Year: No    Number of Places Lived in the Last Year: 1    Unstable Housing in the Last Year: No       Precautions:     Allergies as of 2023 - Reviewed 2023   Allergen Reaction Noted    Motrin [ibuprofen] Itching 2017       WOC Assessment Details/Treatment   Consulted for  wounds left foot + left groin, left lower leg, right foot amputation incision  An 82 year old female admitted 5/16/23 from home with hypokalemia; hypomagnesemia; diarrhea of presumed infectious origin; Vit D deficiency; hypernatremia; history DVT; diastolic CHF chronic; DM II with CKD; essential hypertension; PAD  5/17 WBC 8.37 Hgb 6.2 Hct 20.0  5/18 Alb 2.3 Weight 147 lbs  2/28 A1C 8.2  On Isoflex mattress; Jb score 16  3/2/23 S/P LLE bypass and angioplasty & RLE angioplasty per Dr. Dunbar  3/4/23 S/P Right TMA per Dr. Godfrey  3/31/23 S/P Creation left muscle rotation flap per Dr. Smiley  Has  and home PT  Assessment:  Photodocumentation    Left groin wound- 3 cm circular clean pin wound with scant serous drainage.    Left proximal anterior thigh incision- Healed    Left distal thigh incision- 6 cm open incision with scant white debris in base of wound    Left dorsal foot- Healed wound with peeling dry skin  Left posterior heel- 2.5 cm partial thickness wound with pink base. Small amount serous drainage.  Noted patient unable to extend left lower leg completely. Amy, but able to extend, but not complete. Reports not walking since March    Right TMA- Dry eschar along incision. Stable.  Treatment:  Cleansed wounds with Vashe.   Applied Aquacel Ag + Mepilex 4x4 to left groin and Mepilex 4x4 to anterior thigh incisions.   Applied Mepilex 6x6 to left heel  Applied betadine to TMA and covered with dry gauze secured with Kerlix roll and ACE wrap.  To assist nursing with wounds while in hospital.  Orders placed.     05/18/2023

## 2023-05-18 NOTE — NURSING
Patient returned to unit via stretcher from Endo.  Required assistance x2 for transfer back to bed.  NAD noted.

## 2023-05-18 NOTE — OR NURSING
Report called to floor nurse Yue.  Procedure findings and recommendations given to Yue.  Patient is stable and will be transferred back to room 412.

## 2023-05-18 NOTE — NURSING
[facility] [place] -- 4 [place]      5/18/2023       Skin assessed on: Q Shift      [x] No Pressure Injuries Present    [x]Prevention Measures Documented    [] Yes LDA  for Pressure Injury Previously documented     [] Yes New Pressure Injury Discovered   [] LDA for New Pressure Injury Added      Attending RN:  Pippa Lewis, RN     Second RN:  Yue Horn LPN

## 2023-05-18 NOTE — NURSING
Patient vomited up golytley. Informed patient to give herself a break from drinking. Patient has drank over half of bottle. Zofran administered.

## 2023-05-18 NOTE — TRANSFER OF CARE
"Anesthesia Transfer of Care Note    Patient: Nelsy Marino    Procedure(s) Performed: Procedure(s) (LRB):  COLONOSCOPY (N/A)  ENTEROSCOPY, PROXIMAL (N/A)    Patient location: GI    Anesthesia Type: general    Transport from OR: Transported from OR on room air with adequate spontaneous ventilation    Post assessment: no apparent anesthetic complications    Post vital signs: stable    Level of consciousness: sedated and responds to stimulation    Nausea/Vomiting: no nausea/vomiting    Complications: none    Transfer of care protocol was followed      Last vitals:   Visit Vitals  /64 (BP Location: Left arm, Patient Position: Lying)   Pulse 73   Temp 36.4 °C (97.5 °F) (Oral)   Resp 19   Ht 5' 5" (1.651 m)   Wt 66.7 kg (147 lb)   LMP  (LMP Unknown)   SpO2 99%   Breastfeeding No   BMI 24.46 kg/m²     "

## 2023-05-18 NOTE — PLAN OF CARE
Patient alert and oriented.  Patient denies any pain or discomfort at this time.  Patient is stable and ready to return to room 412. Report called to nurse Turner.

## 2023-05-18 NOTE — NURSING
Patient having continuous watery yellow stool. Patient complaining of discomfort. VASILIY Boland notified. Okay to place flexi seal.     Flexi seal inserted. Patient tolerated well.

## 2023-05-18 NOTE — PLAN OF CARE
Problem: Adult Inpatient Plan of Care  Goal: Plan of Care Review  Outcome: Ongoing, Progressing  Flowsheets (Taken 5/18/2023 1711)  Plan of Care Reviewed With:   patient   spouse   family  Goal: Patient-Specific Goal (Individualized)  Outcome: Ongoing, Progressing  Goal: Absence of Hospital-Acquired Illness or Injury  Outcome: Ongoing, Progressing  Intervention: Identify and Manage Fall Risk  Flowsheets (Taken 5/18/2023 1711)  Safety Promotion/Fall Prevention:   assistive device/personal item within reach   Fall Risk reviewed with patient/family   high risk medications identified   side rails raised x 2   nonskid shoes/socks when out of bed   room near unit station   instructed to call staff for mobility  Intervention: Prevent Skin Injury  Flowsheets (Taken 5/18/2023 1711)  Body Position:   position changed independently   weight shifting  Skin Protection:   adhesive use limited   tubing/devices free from skin contact  Intervention: Prevent and Manage VTE (Venous Thromboembolism) Risk  Flowsheets (Taken 5/18/2023 1711)  Activity Management:   Ankle pumps - L1   Arm raise - L1   Rolling - L1   Straight leg raise - L1  VTE Prevention/Management:   ambulation promoted   bleeding precations maintained   bleeding risk assessed  Range of Motion: active ROM (range of motion) encouraged  Intervention: Prevent Infection  Flowsheets (Taken 5/18/2023 1711)  Infection Prevention: hand hygiene promoted  Goal: Optimal Comfort and Wellbeing  Outcome: Ongoing, Progressing  Goal: Readiness for Transition of Care  Outcome: Ongoing, Progressing     Problem: Fall Injury Risk  Goal: Absence of Fall and Fall-Related Injury  Outcome: Ongoing, Progressing  Intervention: Identify and Manage Contributors  Flowsheets (Taken 5/18/2023 1711)  Self-Care Promotion:   independence encouraged   safe use of adaptive equipment encouraged   BADL personal objects within reach   BADL personal routines maintained   meal set-up provided  Medication  Review/Management:   medications reviewed   high-risk medications identified  Intervention: Promote Injury-Free Environment  Flowsheets (Taken 5/18/2023 1711)  Safety Promotion/Fall Prevention:   assistive device/personal item within reach   Fall Risk reviewed with patient/family   high risk medications identified   side rails raised x 2   nonskid shoes/socks when out of bed   room near unit station   instructed to call staff for mobility     Problem: Skin Injury Risk Increased  Goal: Skin Health and Integrity  Outcome: Ongoing, Progressing  Intervention: Optimize Skin Protection  Flowsheets (Taken 5/18/2023 1711)  Pressure Reduction Techniques:   frequent weight shift encouraged   weight shift assistance provided  Skin Protection:   adhesive use limited   tubing/devices free from skin contact  Head of Bed (HOB) Positioning: HOB at 30-45 degrees     Problem: Diabetes Comorbidity  Goal: Blood Glucose Level Within Targeted Range  Outcome: Ongoing, Progressing     Problem: Adjustment to Illness (Sepsis/Septic Shock)  Goal: Optimal Coping  Outcome: Ongoing, Progressing     Problem: Fluid and Electrolyte Imbalance (Acute Kidney Injury/Impairment)  Goal: Fluid and Electrolyte Balance  Outcome: Ongoing, Progressing  Intervention: Monitor and Manage Fluid and Electrolyte Balance  Flowsheets (Taken 5/18/2023 1711)  Fluid/Electrolyte Management: intravenous fluid replacement initiated     Problem: Oral Intake Inadequate (Acute Kidney Injury/Impairment)  Goal: Optimal Nutrition Intake  Outcome: Ongoing, Progressing     Problem: Renal Function Impairment (Acute Kidney Injury/Impairment)  Goal: Effective Renal Function  Outcome: Ongoing, Progressing     Problem: Infection  Goal: Absence of Infection Signs and Symptoms  Outcome: Ongoing, Progressing     Problem: Impaired Wound Healing  Goal: Optimal Wound Healing  Outcome: Ongoing, Progressing  Intervention: Promote Wound Healing  Flowsheets (Taken 5/18/2023 1711)  Activity  Management:   Ankle pumps - L1   Arm raise - L1   Rolling - L1   Straight leg raise - L1

## 2023-05-18 NOTE — PLAN OF CARE
Problem: Adult Inpatient Plan of Care  Goal: Plan of Care Review  Outcome: Ongoing, Progressing  Flowsheets (Taken 5/18/2023 0357)  Plan of Care Reviewed With: patient    Patient remains free from injury and falls this shift. Completed Golytley prep. Stool clear yellow.No bleeding noted. Received one unit of PRBC, tolerated well. Patient able to turn self in bed independently. Patient resting in intervals, able to able to make needs known. Plan of care continued.

## 2023-05-18 NOTE — ANESTHESIA POSTPROCEDURE EVALUATION
Anesthesia Post Evaluation    Patient: Nelsy Marino    Procedure(s) Performed: Procedure(s) (LRB):  COLONOSCOPY (N/A)  ENTEROSCOPY, PROXIMAL (N/A)    Final Anesthesia Type: general      Patient location during evaluation: GI PACU  Patient participation: Yes- Able to Participate  Level of consciousness: awake and alert  Post-procedure vital signs: reviewed and stable  Pain management: adequate  Airway patency: patent    PONV status at discharge: No PONV  Anesthetic complications: no      Cardiovascular status: hemodynamically stable  Respiratory status: unassisted and spontaneous ventilation  Hydration status: euvolemic  Follow-up not needed.          Vitals Value Taken Time   /90 05/18/23 1357   Temp 36.4 °C (97.5 °F) 05/18/23 1347   Pulse 72 05/18/23 1357   Resp 18 05/18/23 1357   SpO2 97 % 05/18/23 1357         No case tracking events are documented in the log.      Pain/Ana Maria Score: Pain Rating Prior to Med Admin: 5 (5/17/2023  3:24 PM)  Pain Rating Post Med Admin: 0 (5/17/2023  4:24 PM)        
26 y.o. 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

## 2023-05-18 NOTE — NURSING
Notified VASILIY Boland of patient's Hgb 7.8 and if patient should still receive blood transfusion. Per blood bank unit expires June 6 and patient is cross-matched for 3 days. Per Kya, hold blood transfusion for now and new order to transfuse if Hgb<7.

## 2023-05-18 NOTE — PT/OT/SLP PROGRESS
Physical Therapy      Patient Name:  Nelsy Marino   MRN:  [id number]    Patient not seen today secondary to Other (Comment) (Pt just returning from colonoscopy.). Will follow-up tomorrow.

## 2023-05-18 NOTE — SUBJECTIVE & OBJECTIVE
Interval History: No new issues     Review of Systems   Constitutional:  Negative for activity change.   HENT:  Negative for congestion.    Respiratory:  Negative for chest tightness.    Genitourinary:  Negative for difficulty urinating.   Neurological:  Negative for dizziness.   Objective:     Vital Signs (Most Recent):  Temp: 98.1 °F (36.7 °C) (05/18/23 0724)  Pulse: 96 (05/18/23 0724)  Resp: 14 (05/18/23 0724)  BP: (!) 156/67 (05/18/23 0724)  SpO2: 98 % (05/18/23 0724) Vital Signs (24h Range):  Temp:  [97.5 °F (36.4 °C)-98.6 °F (37 °C)] 98.1 °F (36.7 °C)  Pulse:  [75-96] 96  Resp:  [14-20] 14  SpO2:  [95 %-100 %] 98 %  BP: (141-184)/(62-87) 156/67     Weight: 66.7 kg (147 lb)  Body mass index is 24.46 kg/m².    Intake/Output Summary (Last 24 hours) at 5/18/2023 0856  Last data filed at 5/18/2023 0839  Gross per 24 hour   Intake 3578.06 ml   Output 3107.5 ml   Net 470.56 ml         Physical Exam  Constitutional:       Appearance: She is obese.   Cardiovascular:      Rate and Rhythm: Normal rate and regular rhythm.   Pulmonary:      Effort: No respiratory distress.   Skin:     General: Skin is dry.   Neurological:      Mental Status: She is alert and oriented to person, place, and time.           Significant Labs: All pertinent labs within the past 24 hours have been reviewed.  BMP:   Recent Labs   Lab 05/18/23  0528   *      K 3.2*   CL 98   CO2 30*   BUN 7*   CREATININE 0.8   CALCIUM 7.9*   MG 1.5*     CBC:   Recent Labs   Lab 05/16/23  1410 05/17/23  0601 05/17/23  1737 05/17/23  2356   WBC 8.87 8.37  --   --    HGB 7.1* 6.2* 7.8* 6.9*   HCT 21.8* 20.0*  --   --     295  --   --        Significant Imaging:

## 2023-05-18 NOTE — ANESTHESIA PREPROCEDURE EVALUATION
05/18/2023  Nelsy Marino is a 82 y.o., female.      Pre-op Assessment    I have reviewed the Patient Summary Reports.     I have reviewed the Nursing Notes.       Review of Systems  Anesthesia Hx:  No problems with previous Anesthesia  Denies Family Hx of Anesthesia complications.   Denies Personal Hx of Anesthesia complications.   Social:  Former Smoker, No Alcohol Use    Hematology/Oncology:         -- Anemia: Hematology Comments: S/p 1u PRBC 5/18; Hgb 9.5    H/o DVT   Cardiovascular:   Hypertension CHF PVD 3/9/2023 Echo: EF 65%; grade 2 diastolic dysfunction; PAP 55    3/1/2023 stress: negative for ischemia    H/o right fem bypass   Pulmonary:  Pulmonary Normal    Renal/:   Chronic Renal Disease, CKD    Hepatic/GI:  Hepatic/GI Normal    Musculoskeletal:   Mostly bed bound   Neurological:  Neurology Normal    Endocrine:   Diabetes        Physical Exam  General: Well nourished, Cooperative, Alert and Oriented    Airway:  Mallampati: II   Mouth Opening: Small, but > 3cm  TM Distance: 4 - 6 cm  Tongue: Normal  Neck ROM: Extension Decreased    Dental:  Edentulous    Chest/Lungs:  Normal Respiratory Rate    Heart:  Rate: Normal      Wt Readings from Last 3 Encounters:   05/16/23 66.7 kg (147 lb)   04/28/23 68 kg (149 lb 14.6 oz)   04/04/23 67 kg (147 lb 11.3 oz)     Temp Readings from Last 3 Encounters:   05/18/23 36.7 °C (98.1 °F) (Oral)   05/05/23 36.1 °C (96.9 °F) (Temporal)   04/14/23 36.7 °C (98.1 °F) (Other (see comments))     BP Readings from Last 3 Encounters:   05/18/23 (!) 156/67   05/05/23 119/78   04/14/23 (!) 93/43     Pulse Readings from Last 3 Encounters:   05/18/23 96   05/05/23 74   04/14/23 91     Lab Results   Component Value Date    WBC 8.37 05/17/2023    HGB 6.9 (L) 05/17/2023    HCT 20.0 (L) 05/17/2023    MCV 84 05/17/2023     05/17/2023       CMP  Sodium   Date Value Ref Range  Status   05/18/2023 141 136 - 145 mmol/L Final     Potassium   Date Value Ref Range Status   05/18/2023 3.2 (L) 3.5 - 5.1 mmol/L Final     Chloride   Date Value Ref Range Status   05/18/2023 98 95 - 110 mmol/L Final     CO2   Date Value Ref Range Status   05/18/2023 30 (H) 23 - 29 mmol/L Final     Glucose   Date Value Ref Range Status   05/18/2023 185 (H) 70 - 110 mg/dL Final     BUN   Date Value Ref Range Status   05/18/2023 7 (L) 8 - 23 mg/dL Final     Creatinine   Date Value Ref Range Status   05/18/2023 0.8 0.5 - 1.4 mg/dL Final     Calcium   Date Value Ref Range Status   05/18/2023 7.9 (L) 8.7 - 10.5 mg/dL Final     Total Protein   Date Value Ref Range Status   05/18/2023 6.2 6.0 - 8.4 g/dL Final     Albumin   Date Value Ref Range Status   05/18/2023 2.3 (L) 3.5 - 5.2 g/dL Final     Total Bilirubin   Date Value Ref Range Status   05/18/2023 1.5 (H) 0.1 - 1.0 mg/dL Final     Comment:     For infants and newborns, interpretation of results should be based  on gestational age, weight and in agreement with clinical  observations.    Premature Infant recommended reference ranges:  Up to 24 hours.............<8.0 mg/dL  Up to 48 hours............<12.0 mg/dL  3-5 days..................<15.0 mg/dL  6-29 days.................<15.0 mg/dL       Alkaline Phosphatase   Date Value Ref Range Status   05/18/2023 48 (L) 55 - 135 U/L Final     AST   Date Value Ref Range Status   05/18/2023 17 10 - 40 U/L Final     ALT   Date Value Ref Range Status   05/18/2023 7 (L) 10 - 44 U/L Final     Anion Gap   Date Value Ref Range Status   05/18/2023 13 8 - 16 mmol/L Final     eGFR   Date Value Ref Range Status   05/18/2023 >60 >60 mL/min/1.73 m^2 Final         Anesthesia Plan  Type of Anesthesia, risks & benefits discussed:    Anesthesia Type: Gen Natural Airway, MAC  Intra-op Monitoring Plan: Standard ASA Monitors  Post Op Pain Control Plan: multimodal analgesia  Induction:  IV  Informed Consent: Informed consent signed with the Patient  and all parties understand the risks and agree with anesthesia plan.  All questions answered.   ASA Score: 4  Day of Surgery Review of History & Physical: H&P Update referred to the surgeon/provider.    Ready For Surgery From Anesthesia Perspective.     .

## 2023-05-18 NOTE — PROGRESS NOTES
[facility] Medicine  Progress Note    Patient Name: Nelsy Marino  MRN: [id number]  Patient Class: IP- Inpatient   Admission Date: 5/16/2023  Length of Stay: 2 days  Attending Physician: Jacobo Phillips MD  Primary Care Provider: Juan F Garay        Subjective:     Principal Problem:Hypokalemia        HPI:  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,who presented to the ED at the account of his primary physician after her home health nurse sent routine labs during her follow up,serum K was 2.1, she voiced ongoing diarrhea x 5 days,said to be multiple time daily,she's not walking as she's trying to take pressure of her legs per her surgeon, she voiced associated abdominal pain this morning,said to be generalized with nausea but no vomiting reported,she denied fever,chills or rigor,denied increase drainage from her her left hip surgical drain.She has no urinary symptom of dysuria,frequency,urgency,straining at micturition or hematuria,currently on zosyn with the stop day per spouse tomorrow 5/17/2023.  Labs at presentation showed serun K 2.1,Na 146,BUN/Cr 13/1.1, with H/H 7.1/21.8      Overview/Hospital Course:  Patient admitted to the hospital for hypokalemia- found on routing labs at home with H/H. K replaced. Noted to have a GI bleed. Transfused one unit of blood on 5/17. GI consulted. Hypo K likely from diarrhea. C-diff sent- negative. GI plans dual scopes on 5/18.      Interval History: No new issues     Review of Systems   Constitutional:  Negative for activity change.   HENT:  Negative for congestion.    Respiratory:  Negative for chest tightness.     Genitourinary:  Negative for difficulty urinating.   Neurological:  Negative for dizziness.   Objective:     Vital Signs (Most Recent):  Temp: 98.1 °F (36.7 °C) (05/18/23 0724)  Pulse: 96 (05/18/23 0724)  Resp: 14 (05/18/23 0724)  BP: (!) 156/67 (05/18/23 0724)  SpO2: 98 % (05/18/23 0724) Vital Signs (24h Range):  Temp:  [97.5 °F (36.4 °C)-98.6 °F (37 °C)] 98.1 °F (36.7 °C)  Pulse:  [75-96] 96  Resp:  [14-20] 14  SpO2:  [95 %-100 %] 98 %  BP: (141-184)/(62-87) 156/67     Weight: 66.7 kg (147 lb)  Body mass index is 24.46 kg/m².    Intake/Output Summary (Last 24 hours) at 5/18/2023 0856  Last data filed at 5/18/2023 0839  Gross per 24 hour   Intake 3578.06 ml   Output 3107.5 ml   Net 470.56 ml         Physical Exam  Constitutional:       Appearance: She is obese.   Cardiovascular:      Rate and Rhythm: Normal rate and regular rhythm.   Pulmonary:      Effort: No respiratory distress.   Skin:     General: Skin is dry.   Neurological:      Mental Status: She is alert and oriented to person, place, and time.           Significant Labs: All pertinent labs within the past 24 hours have been reviewed.  BMP:   Recent Labs   Lab 05/18/23  0528   *      K 3.2*   CL 98   CO2 30*   BUN 7*   CREATININE 0.8   CALCIUM 7.9*   MG 1.5*     CBC:   Recent Labs   Lab 05/16/23  1410 05/17/23  0601 05/17/23  1737 05/17/23  2356   WBC 8.87 8.37  --   --    HGB 7.1* 6.2* 7.8* 6.9*   HCT 21.8* 20.0*  --   --     295  --   --        Significant Imaging:       Assessment/Plan:      * Hypokalemia  Patient sent here by PCP for hypokalemia noticed on routine blood work  She has been on chronic antibiotics for suppression therapy following femoral-femoral bilateral artery bypass with graft  Currently on zosyn,but has been having diarrhea for the past 4 days  Hypokalemia likely due to GI loss  Will replete K and work up cause of diarrhea.    Today at 3.3. Will replace further- replace Mag      Hypomagnesemia  Serum Mg is low  likely related to ongoing diarrhea  Will replete serum Mg      Diarrhea of presumed infectious origin  Patient on zosyn possibly for suppression therapy  Post femoral-femoral artery by pass with graft  Voiced mild abdominal pain,wbc within normal limit  No use of laxatives resently  Will r/o C.diff at this time  Long hospital stay,recently discharged    C-diff negative       Vitamin D deficiency  History of Vitamin D def  Lat vitamin 25 -oh was 20 (4/24/23)  Continue vitamin D supplementation,50,000 units weekly of ergocalciferol x 12 weeks      Hypernatremia  Serum Na of 146  Likely related to reduced intake in the setting of ongoing diarrhea  Free water deficit of 1.3 liters, will correct over the next 24 hours with 5% dextrose water  Will monitor BMP 2 12 hours x 2    History of DVT (deep vein thrombosis)  History of DVT  But off Eliquis due to significant GI bleed  Will continue to monitor  Consider dvt prophylaxis at this time,while inpatient and barely moving.      Diastolic CHF, chronic  Patient is identified as having Diastolic (HFpEF) heart failure that is Chronic. CHF is currently controlled. Latest ECHO performed and demonstrates- Results for orders placed during the hospital encounter of 02/27/23    Echo    Interpretation Summary  · The estimated ejection fraction is 65%.  · The left ventricle is normal in size with normal systolic function.  · Grade II left ventricular diastolic dysfunction.  · Normal right ventricular size with normal right ventricular systolic function.  · Mild left atrial enlargement.  · Mild-to-moderate mitral regurgitation.  · There is pulmonary hypertension.  · The estimated PA systolic pressure is 55 mmHg.  · Intermediate central venous pressure (8 mmHg).  . Continue Beta Blocker and Furosemide and monitor clinical status closely. Monitor on telemetry. Patient is off CHF pathway.  Monitor strict Is&Os and daily weights.  Place on fluid restriction of 1.5 L. Continue to stress to  patient importance of self efficacy and  on diet for CHF. Last BNP reviewed- and noted below   Recent Labs   Lab 05/16/23  1410   *   .  Not in heart failure exacerbation at this time.no orthopnea,PND or pedal swelling  No elevation in JVD    Type 2 diabetes mellitus with chronic kidney disease, with long-term current use of insulin  Type 2 DM with hyperglycemia  Start basal,bolus and correctional insulin  Patient's FSGs are uncontrolled due to hyperglycemia on current medication regimen.  Last A1c reviewed-   Lab Results   Component Value Date    HGBA1C 8.2 (H) 02/28/2023     Most recent fingerstick glucose reviewed-   Recent Labs   Lab 05/16/23  1255   POCTGLUCOSE 134*     Current correctional scale  Medium  Maintain anti-hyperglycemic dose as follows-   Antihyperglycemics (From admission, onward)    Start     Stop Route Frequency Ordered    05/16/23 2100  insulin detemir U-100 (Levemir) pen 4 Units         -- SubQ Nightly 05/16/23 1604        Hold Oral hypoglycemics while patient is in the hospital.    Essential hypertension  Sub optimal BP control  /65 mmhg  Continue home antihypertensive - amlodipine and carvedilol  Continue lifestyle modification  Low salt diet.    Adjusting meds    PAD (peripheral artery disease)  History of peripheral artery disease s/p femora-femoral bilateral artery bypass with graft and  Right Iliofemoral artery bypass.  With right foot ray amputation for ? osteomyelitis  Continue statin  She could not take eliquis due to previous GI bleed  Will continue antibiotics  Vascular consult to review while in patient      acute blood loss anemia- GI consulted. Dual scopes today     VTE Risk Mitigation (From admission, onward)         Ordered     IP VTE HIGH RISK PATIENT  Once         05/16/23 1604     Place sequential compression device  Until discontinued         05/16/23 1604                Discharge Planning   JUAN ALBERTO:      Code Status: Full Code   Is the patient medically  ready for discharge?:     Reason for patient still in hospital (select all that apply): Patient unstable  Discharge Plan A: Home with family                  Jacobo Norris MD  Department of Hospital Medicine   [facility]

## 2023-05-18 NOTE — [employer]ATION PATIENT INSTRUCTIONS
Discharge Summary/Instructions after an Endoscopic Procedure  Patient Name: Nelsy Marino  Patient MRN: [id number]  Patient YOB: 1940  Thursday, May 18, 2023  Cuong Aguiar MD  Dear patient,  As a result of recent federal legislation (The Federal Cures Act), you may   receive lab or pathology results from your procedure in your [facility]   account before your physician is able to contact you. Your physician or   their representative will relay the results to you with their   recommendations at their soonest availability.  Thank you,  RESTRICTIONS:  During your procedure today, you received medications for sedation.  These   medications may affect your judgment, balance and coordination.  Therefore,   for 24 hours, you have the following restrictions:   - DO NOT drive a car, operate machinery, make legal/financial decisions,   sign important papers or drink alcohol.    ACTIVITY:  Today: no heavy lifting, straining or running due to procedural   sedation/anesthesia.  The following day: return to full activity including work.  DIET:  Eat and drink normally unless instructed otherwise.     TREATMENT FOR COMMON SIDE EFFECTS:  - Mild abdominal pain, nausea, belching, bloating or excessive gas:  rest,   eat lightly and use a heating pad.  - Sore Throat: treat with throat lozenges and/or gargle with warm salt   water.  - Because air was used during the procedure, expelling large amounts of air   from your rectum or belching is normal.  - If a bowel prep was taken, you may not have a bowel movement for 1-3 days.    This is normal.  SYMPTOMS TO WATCH FOR AND REPORT TO YOUR PHYSICIAN:  1. Abdominal pain or bloating, other than gas cramps.  2. Chest pain.  3. Back pain.  4. Signs of infection such as: chills or fever occurring within 24 hours   after the procedure.  5. Rectal bleeding, which would show as bright red, maroon, or black stools.   (A tablespoon of blood from the rectum is not serious, especially if    hemorrhoids are present.)  6. Vomiting.  7. Weakness or dizziness.  GO DIRECTLY TO THE NEAREST EMERGENCY ROOM IF YOU HAVE ANY OF THE FOLLOWING:      Difficulty breathing              Chills and/or fever over 101 F   Persistent vomiting and/or vomiting blood   Severe abdominal pain   Severe chest pain   Black, tarry stools   Bleeding- more than one tablespoon   Any other symptom or condition that you feel may need urgent attention  Your doctor recommends these additional instructions:  If any biopsies were taken, your doctors clinic will contact you in 1 to 2   weeks with any results.  - Return patient to hospital [facility] for ongoing care.   - Resume previous diet.   - Use a proton pump inhibitor PO BID indefinitely.   - Perform a colonoscopy today  - If no definitive cause of GI bleeding found on colonoscopy and bleeding   continued, consider VCE  For questions, problems or results please call your physician - Cuong Aguiar MD at Work:  [phone].  [facility] can be reached at [phone]     IF A COMPLICATION OR EMERGENCY SITUATION ARISES AND YOU ARE UNABLE TO REACH   YOUR PHYSICIAN - GO DIRECTLY TO THE EMERGENCY ROOM.  Cuong Aguiar MD  5/18/2023 1:47:28 PM  This report has been verified and signed electronically.  Dear patient,  As a result of recent federal legislation (The Federal Cures Act), you may   receive lab or pathology results from your procedure in your [facility]   account before your physician is able to contact you. Your physician or   their representative will relay the results to you with their   recommendations at their soonest availability.  Thank you,  PROVATION

## 2023-05-19 LAB
ACID FAST MOD KINY STN SPEC: NORMAL
ALBUMIN SERPL BCP-MCNC: 2.2 G/DL (ref 3.5–5.2)
ALP SERPL-CCNC: 46 U/L (ref 55–135)
ALT SERPL W/O P-5'-P-CCNC: 7 U/L (ref 10–44)
ANION GAP SERPL CALC-SCNC: 10 MMOL/L (ref 8–16)
AST SERPL-CCNC: 17 U/L (ref 10–40)
BILIRUB SERPL-MCNC: 0.5 MG/DL (ref 0.1–1)
BUN SERPL-MCNC: 9 MG/DL (ref 8–23)
CALCIUM SERPL-MCNC: 8.1 MG/DL (ref 8.7–10.5)
CHLORIDE SERPL-SCNC: 101 MMOL/L (ref 95–110)
CO2 SERPL-SCNC: 28 MMOL/L (ref 23–29)
CREAT SERPL-MCNC: 1 MG/DL (ref 0.5–1.4)
EST. GFR  (NO RACE VARIABLE): 56 ML/MIN/1.73 M^2
GLUCOSE SERPL-MCNC: 106 MG/DL (ref 70–110)
HGB BLD-MCNC: 7.2 G/DL (ref 12–16)
HGB BLD-MCNC: 8 G/DL (ref 12–16)
MAGNESIUM SERPL-MCNC: 2.1 MG/DL (ref 1.6–2.6)
MYCOBACTERIUM SPEC QL CULT: NORMAL
POTASSIUM SERPL-SCNC: 3.4 MMOL/L (ref 3.5–5.1)
PROT SERPL-MCNC: 5.9 G/DL (ref 6–8.4)
SODIUM SERPL-SCNC: 139 MMOL/L (ref 136–145)

## 2023-05-19 PROCEDURE — 83735 ASSAY OF MAGNESIUM: CPT | Performed by: STUDENT IN AN ORGANIZED HEALTH CARE EDUCATION/TRAINING PROGRAM

## 2023-05-19 PROCEDURE — 36415 COLL VENOUS BLD VENIPUNCTURE: CPT | Performed by: INTERNAL MEDICINE

## 2023-05-19 PROCEDURE — 99232 PR SUBSEQUENT HOSPITAL CARE,LEVL II: ICD-10-PCS | Mod: ,,, | Performed by: NURSE PRACTITIONER

## 2023-05-19 PROCEDURE — 99232 SBSQ HOSP IP/OBS MODERATE 35: CPT | Mod: ,,, | Performed by: NURSE PRACTITIONER

## 2023-05-19 PROCEDURE — 80053 COMPREHEN METABOLIC PANEL: CPT | Performed by: STUDENT IN AN ORGANIZED HEALTH CARE EDUCATION/TRAINING PROGRAM

## 2023-05-19 PROCEDURE — 11000001 HC ACUTE MED/SURG PRIVATE ROOM

## 2023-05-19 PROCEDURE — 36415 COLL VENOUS BLD VENIPUNCTURE: CPT | Performed by: STUDENT IN AN ORGANIZED HEALTH CARE EDUCATION/TRAINING PROGRAM

## 2023-05-19 PROCEDURE — 85018 HEMOGLOBIN: CPT | Performed by: INTERNAL MEDICINE

## 2023-05-19 PROCEDURE — 97535 SELF CARE MNGMENT TRAINING: CPT

## 2023-05-19 PROCEDURE — 25000003 PHARM REV CODE 250: Performed by: INTERNAL MEDICINE

## 2023-05-19 PROCEDURE — 25000003 PHARM REV CODE 250: Performed by: STUDENT IN AN ORGANIZED HEALTH CARE EDUCATION/TRAINING PROGRAM

## 2023-05-19 PROCEDURE — 97165 OT EVAL LOW COMPLEX 30 MIN: CPT

## 2023-05-19 PROCEDURE — 97161 PT EVAL LOW COMPLEX 20 MIN: CPT

## 2023-05-19 RX ORDER — POTASSIUM CHLORIDE 20 MEQ/1
60 TABLET, EXTENDED RELEASE ORAL ONCE
Status: COMPLETED | OUTPATIENT
Start: 2023-05-19 | End: 2023-05-19

## 2023-05-19 RX ADMIN — OXYCODONE HYDROCHLORIDE 5 MG: 5 TABLET ORAL at 11:05

## 2023-05-19 RX ADMIN — CARVEDILOL 25 MG: 12.5 TABLET, FILM COATED ORAL at 06:05

## 2023-05-19 RX ADMIN — HYDRALAZINE HYDROCHLORIDE 75 MG: 25 TABLET, FILM COATED ORAL at 05:05

## 2023-05-19 RX ADMIN — CARVEDILOL 25 MG: 12.5 TABLET, FILM COATED ORAL at 08:05

## 2023-05-19 RX ADMIN — OXYCODONE HYDROCHLORIDE 5 MG: 5 TABLET ORAL at 08:05

## 2023-05-19 RX ADMIN — HYDRALAZINE HYDROCHLORIDE 75 MG: 25 TABLET, FILM COATED ORAL at 10:05

## 2023-05-19 RX ADMIN — POTASSIUM CHLORIDE 60 MEQ: 1500 TABLET, EXTENDED RELEASE ORAL at 08:05

## 2023-05-19 RX ADMIN — DULOXETINE 30 MG: 30 CAPSULE, DELAYED RELEASE ORAL at 10:05

## 2023-05-19 RX ADMIN — MUPIROCIN: 20 OINTMENT TOPICAL at 10:05

## 2023-05-19 RX ADMIN — SODIUM CHLORIDE: 9 INJECTION, SOLUTION INTRAVENOUS at 06:05

## 2023-05-19 RX ADMIN — INSULIN DETEMIR 4 UNITS: 100 INJECTION, SOLUTION SUBCUTANEOUS at 10:05

## 2023-05-19 RX ADMIN — PANTOPRAZOLE SODIUM 40 MG: 40 TABLET, DELAYED RELEASE ORAL at 08:05

## 2023-05-19 RX ADMIN — Medication 6 MG: at 10:05

## 2023-05-19 RX ADMIN — PANTOPRAZOLE SODIUM 40 MG: 40 TABLET, DELAYED RELEASE ORAL at 10:05

## 2023-05-19 RX ADMIN — OXYCODONE HYDROCHLORIDE 5 MG: 5 TABLET ORAL at 03:05

## 2023-05-19 RX ADMIN — DULOXETINE 30 MG: 30 CAPSULE, DELAYED RELEASE ORAL at 08:05

## 2023-05-19 RX ADMIN — MUPIROCIN: 20 OINTMENT TOPICAL at 08:05

## 2023-05-19 RX ADMIN — ATORVASTATIN CALCIUM 40 MG: 40 TABLET, FILM COATED ORAL at 10:05

## 2023-05-19 NOTE — SUBJECTIVE & OBJECTIVE
Interval History: No new issues     Review of Systems   Constitutional:  Negative for activity change.   HENT:  Negative for congestion.    Respiratory:  Negative for chest tightness.    Genitourinary:  Negative for difficulty urinating.   Neurological:  Negative for dizziness.   Objective:     Vital Signs (Most Recent):  Temp: 98.4 °F (36.9 °C) (05/19/23 0721)  Pulse: 84 (05/19/23 0721)  Resp: 18 (05/19/23 0721)  BP: (!) 123/58 (05/19/23 0721)  SpO2: 98 % (05/19/23 0721) Vital Signs (24h Range):  Temp:  [96 °F (35.6 °C)-98.6 °F (37 °C)] 98.4 °F (36.9 °C)  Pulse:  [67-90] 84  Resp:  [14-19] 18  SpO2:  [94 %-99 %] 98 %  BP: ()/(49-90) 123/58     Weight: 66.7 kg (147 lb)  Body mass index is 24.46 kg/m².    Intake/Output Summary (Last 24 hours) at 5/19/2023 0742  Last data filed at 5/19/2023 0400  Gross per 24 hour   Intake 1033.1 ml   Output 700 ml   Net 333.1 ml         Physical Exam  Constitutional:       Appearance: She is obese.   Cardiovascular:      Rate and Rhythm: Normal rate and regular rhythm.   Pulmonary:      Effort: No respiratory distress.   Skin:     General: Skin is dry.   Neurological:      Mental Status: She is alert and oriented to person, place, and time.           Significant Labs: All pertinent labs within the past 24 hours have been reviewed.  BMP:   Recent Labs   Lab 05/19/23  0428         K 3.4*      CO2 28   BUN 9   CREATININE 1.0   CALCIUM 8.1*   MG 2.1     CBC:   Recent Labs   Lab 05/18/23  0834 05/18/23  1613 05/18/23  2353   HGB 9.5* 9.3* 7.2*       Significant Imaging:

## 2023-05-19 NOTE — PROGRESS NOTES
[facility] Medicine  Progress Note    Patient Name: Nelsy Marino  MRN: [id number]  Patient Class: IP- Inpatient   Admission Date: 5/16/2023  Length of Stay: 3 days  Attending Physician: Jacobo Phillips MD  Primary Care Provider: Juan F Garay        Subjective:     Principal Problem:Hypokalemia        HPI:  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,who presented to the ED at the account of his primary physician after her home health nurse sent routine labs during her follow up,serum K was 2.1, she voiced ongoing diarrhea x 5 days,said to be multiple time daily,she's not walking as she's trying to take pressure of her legs per her surgeon, she voiced associated abdominal pain this morning,said to be generalized with nausea but no vomiting reported,she denied fever,chills or rigor,denied increase drainage from her her left hip surgical drain.She has no urinary symptom of dysuria,frequency,urgency,straining at micturition or hematuria,currently on zosyn with the stop day per spouse tomorrow 5/17/2023.  Labs at presentation showed serun K 2.1,Na 146,BUN/Cr 13/1.1, with H/H 7.1/21.8      Overview/Hospital Course:  Patient admitted to the hospital for hypokalemia- found on routing labs at home with H/H. K replaced. Noted to have a GI bleed. Transfused one unit of blood on 5/17. GI consulted. Hypo K likely from diarrhea. C-diff sent- negative. 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 C-scope showed hemorrhage in ascending colon- treated. H/H remained stable. PT/OT consulted.       Interval History: No new issues      Review of Systems   Constitutional:  Negative for activity change.   HENT:  Negative for congestion.    Respiratory:  Negative for chest tightness.    Genitourinary:  Negative for difficulty urinating.   Neurological:  Negative for dizziness.   Objective:     Vital Signs (Most Recent):  Temp: 98.4 °F (36.9 °C) (05/19/23 0721)  Pulse: 84 (05/19/23 0721)  Resp: 18 (05/19/23 0721)  BP: (!) 123/58 (05/19/23 0721)  SpO2: 98 % (05/19/23 0721) Vital Signs (24h Range):  Temp:  [96 °F (35.6 °C)-98.6 °F (37 °C)] 98.4 °F (36.9 °C)  Pulse:  [67-90] 84  Resp:  [14-19] 18  SpO2:  [94 %-99 %] 98 %  BP: ()/(49-90) 123/58     Weight: 66.7 kg (147 lb)  Body mass index is 24.46 kg/m².    Intake/Output Summary (Last 24 hours) at 5/19/2023 0742  Last data filed at 5/19/2023 0400  Gross per 24 hour   Intake 1033.1 ml   Output 700 ml   Net 333.1 ml         Physical Exam  Constitutional:       Appearance: She is obese.   Cardiovascular:      Rate and Rhythm: Normal rate and regular rhythm.   Pulmonary:      Effort: No respiratory distress.   Skin:     General: Skin is dry.   Neurological:      Mental Status: She is alert and oriented to person, place, and time.           Significant Labs: All pertinent labs within the past 24 hours have been reviewed.  BMP:   Recent Labs   Lab 05/19/23  0428         K 3.4*      CO2 28   BUN 9   CREATININE 1.0   CALCIUM 8.1*   MG 2.1     CBC:   Recent Labs   Lab 05/18/23  0834 05/18/23  1613 05/18/23  2353   HGB 9.5* 9.3* 7.2*       Significant Imaging:       Assessment/Plan:      * Hypokalemia  Patient sent here by PCP for hypokalemia noticed on routine blood work  She has been on chronic antibiotics for suppression therapy following femoral-femoral bilateral artery bypass with graft  Currently on zosyn,but has been having diarrhea for the past 4 days  Hypokalemia likely due to GI loss  Will replete K and work up cause of diarrhea.    Today at 3.3. Will replace further- replace  Mag      Hypomagnesemia  Serum Mg is low likely related to ongoing diarrhea  Will replete serum Mg      Diarrhea of presumed infectious origin  Patient on zosyn possibly for suppression therapy  Post femoral-femoral artery by pass with graft  Voiced mild abdominal pain,wbc within normal limit  No use of laxatives resently  Will r/o C.diff at this time  Long hospital stay,recently discharged    C-diff negative       Vitamin D deficiency  History of Vitamin D def  Lat vitamin 25 -oh was 20 (4/24/23)  Continue vitamin D supplementation,50,000 units weekly of ergocalciferol x 12 weeks      Hypernatremia  Serum Na of 146  Likely related to reduced intake in the setting of ongoing diarrhea  Free water deficit of 1.3 liters, will correct over the next 24 hours with 5% dextrose water  Will monitor BMP 2 12 hours x 2    History of DVT (deep vein thrombosis)  History of DVT  But off Eliquis due to significant GI bleed  Will continue to monitor  Consider dvt prophylaxis at this time,while inpatient and barely moving.      Diastolic CHF, chronic  Patient is identified as having Diastolic (HFpEF) heart failure that is Chronic. CHF is currently controlled. Latest ECHO performed and demonstrates- Results for orders placed during the hospital encounter of 02/27/23    Echo    Interpretation Summary  · The estimated ejection fraction is 65%.  · The left ventricle is normal in size with normal systolic function.  · Grade II left ventricular diastolic dysfunction.  · Normal right ventricular size with normal right ventricular systolic function.  · Mild left atrial enlargement.  · Mild-to-moderate mitral regurgitation.  · There is pulmonary hypertension.  · The estimated PA systolic pressure is 55 mmHg.  · Intermediate central venous pressure (8 mmHg).  . Continue Beta Blocker and Furosemide and monitor clinical status closely. Monitor on telemetry. Patient is off CHF pathway.  Monitor strict Is&Os and daily weights.  Place on fluid  "restriction of 1.5 L. Continue to stress to patient importance of self efficacy and  on diet for CHF. Last BNP reviewed- and noted below   Recent Labs   Lab 05/16/23  1410   *   .  Not in heart failure exacerbation at this time.no orthopnea,PND or pedal swelling  No elevation in JVD    Type 2 diabetes mellitus with chronic kidney disease, with long-term current use of insulin  Type 2 DM with hyperglycemia  Start basal,bolus and correctional insulin  Patient's FSGs are uncontrolled due to hyperglycemia on current medication regimen.  Last A1c reviewed-   Lab Results   Component Value Date    HGBA1C 8.2 (H) 02/28/2023     Most recent fingerstick glucose reviewed-   Recent Labs   Lab 05/16/23  1255   POCTGLUCOSE 134*     Current correctional scale  Medium  Maintain anti-hyperglycemic dose as follows-   Antihyperglycemics (From admission, onward)    Start     Stop Route Frequency Ordered    05/16/23 2100  insulin detemir U-100 (Levemir) pen 4 Units         -- SubQ Nightly 05/16/23 1604        Hold Oral hypoglycemics while patient is in the hospital.    Essential hypertension  Sub optimal BP control  /65 mmhg  Continue home antihypertensive - amlodipine and carvedilol  Continue lifestyle modification  Low salt diet.    Adjusting meds    PAD (peripheral artery disease)  History of peripheral artery disease s/p femora-femoral bilateral artery bypass with graft and  Right Iliofemoral artery bypass.  With right foot ray amputation for ? osteomyelitis  Continue statin  She could not take eliquis due to previous GI bleed  Will continue antibiotics  Vascular consult to review while in patient      Debility- PT/OT consult pending for today    Acute blood loss anemia- s/p endoscopy, colonoscopy. Hgb pending.  See "Overview" for endoscopy results.      VTE Risk Mitigation (From admission, onward)         Ordered     IP VTE HIGH RISK PATIENT  Once         05/16/23 1604     Place sequential compression device  " Until discontinued         05/16/23 1604                Discharge Planning   JUAN ALBERTO:      Code Status: Full Code   Is the patient medically ready for discharge?:     Reason for patient still in hospital (select all that apply): Patient unstable  Discharge Plan A: Home with family        If Hgb stable in am- will discharge to home. PT/OT consult pending             Jacobo Norris MD  Department of Hospital Medicine   [facility]

## 2023-05-19 NOTE — PT/OT/SLP EVAL
Occupational Therapy   Evaluation    Name: Nelsy Marino  MRN: [id number]  Admitting Diagnosis: Hypokalemia  Recent Surgery: Procedure(s) (LRB):  COLONOSCOPY (N/A)  ENTEROSCOPY, PROXIMAL (N/A) 1 Day Post-Op    Recommendations:     Discharge Recommendations: nursing facility, skilled  Discharge Equipment Recommendations:  none (TBD depending on discharge needs)  Barriers to discharge:  Decreased caregiver support    Assessment:     Nelsy Marino is a 82 y.o. female with a medical diagnosis of Hypokalemia.  She presents with seated with HOB elevated and sister present in room with no darco shoe on right foot and no oxygen donned. Performance deficits affecting function: weakness, impaired endurance, impaired functional mobility, impaired self care skills, gait instability, impaired balance, decreased lower extremity function, decreased safety awareness, impaired skin, edema.      Rehab Prognosis: Good; patient would benefit from acute skilled OT services to address these deficits and reach maximum level of function.       Plan:     Patient to be seen 5 x/week to address the above listed problems via self-care/home management, therapeutic activities, therapeutic exercises  Plan of Care Expires: 06/02/23  Plan of Care Reviewed with: patient, sibling    Subjective     Chief Complaint: left lower extremity pain and R foot wound   Patient/Family Comments/goals: patient wants to improve; says she has equipment at home     Occupational Profile:  Living Environment: [facility] with 1 step   Previous level of function: lives with spouse, needed A with adls due to right foot wound.   Roles and Routines: wife, sister   Equipment Used at Home: bedside commode, wheelchair, shower chair, walker, rolling, hospital bed  Assistance upon Discharge: family     Pain/Comfort:  Pain Rating 1: 6/10  Location - Side 1: Left  Location 1: leg  Pain Addressed 1: Pre-medicate for activity, Nurse notified    Patients cultural, spiritual, Methodist  conflicts given the current situation: no    Objective:     Communicated with: RN,  prior to session.  Patient found with seated in bed with HOB elevated with telemetry, PureWick, peripheral IV (darco shoe for R foot; patient said her  has L shoe; wound J drain) upon OT entry to room.    General Precautions: Standard, fall, diabetic  Orthopedic Precautions:  (wear darco shoes on both feet due to R foot wound)  Braces:  (darco shoes)  Respiratory Status: Room air    Occupational Performance:    Bed Mobility:    Patient completed Rolling/Turning to Left with  contact guard assistance  Patient completed Scooting/Bridging with contact guard assistance  Patient completed Supine to Sit with contact guard assistance    Functional Mobility/Transfers:  Patient completed Sit <> Stand Transfer with minimum assistance  with  rolling walker   Patient completed Bed <> Chair Transfer using walked  technique with minimum assistance with rolling walker  Functional Mobility: Patient walked with RW from bed to chair, but did not have good safety awareness of using RW, so needed max instructions to sit down safely in chair.     Activities of Daily Living:  Grooming: stand by assistance seated in chair  Upper Body Dressing: minimum assistance with overgown   Lower Body Dressing: total assistance with darco shoe donning     Cognitive/Visual Perceptual:  Cognitive/Psychosocial Skills:     -       Oriented to: Person, Place, Time, and Situation   -       Follows Commands/attention:Follows multistep  commands  -       Communication: clear/fluent  -       Memory: No Deficits noted  -       Safety awareness/insight to disability: impaired   -       Mood/Affect/Coping skills/emotional control: Appropriate to situation  Visual/Perceptual:      -Intact     Physical Exam:  Balance:    -       sitting: SBA; standing: min assist due to LOB   Postural examination/scapula alignment:    -       No postural abnormalities identified  Skin  integrity: Visible skin intact  Edema:  Mild right upper extremity 2+ pitting edema   Sensation:    -       Intact  Motor Planning:    -       intact   Dominant hand:    -       Right   Upper Extremity Range of Motion:     -       Right Upper Extremity: WNL  -       Left Upper Extremity: WNL  Upper Extremity Strength:    -       Right Upper Extremity: WNL  -       Left Upper Extremity: WNL   Strength:    -       Right Upper Extremity: WNL  -       Left Upper Extremity: WNL  Fine Motor Coordination:    -       Intact  Gross motor coordination:   WFL  Neurological:    -       none noted     AMPA 6 Click ADL:  [facility] Total Score: 16    Treatment & Education:  Patient sat EOB to don overgown with min assist due to IV and total assistance with Darco shoe due to wound on right foot. She did sit to stand t/f x 2, but had LOB on first one and had to sit on bed. She walked to chair during second sit to stand t/f, but tried to quickly sit down in chair unsafely. She and her sister said they are looking for her left Darco shoe that may be with her spouse at home. Occupational therapy setup BSC in room because patient only able to do short ambulation due to right foot wound and reduced endurance.     Patient left up in chair with all lines intact, call button in reach, RN  notified, and sister present    GOALS:   Multidisciplinary Problems       Occupational Therapy Goals       Not on file                    History:     Past Medical History:   Diagnosis Date    CHF (congestive heart failure)     Diabetes mellitus     DVT (deep venous thrombosis)     Hypertension     Miscarriage          Past Surgical History:   Procedure Laterality Date     femoral vascular surgery       ANGIOGRAPHY OF LOWER EXTREMITY Right 2/23/2023    Procedure: Angiogram Extremity Unilateral;  Surgeon: Danny Dunbar MD;  Location: [facility] OR;  Service: Vascular;  Laterality: Right;  RN PHONE PREOP 2/20/2023----CK CONSENT    ANGIOGRAPHY OF LOWER  EXTREMITY Right 4/4/2023    Procedure: Angiogram Extremity Unilateral - possible endovascular intervention;  Surgeon: René Simms MD;  Location: [facility] OR 2ND FLR;  Service: Vascular;  Laterality: Right;  19.6 mins.  740.81 mGy  73.3399 Gy.cm  58ml dye  local - 1ml    COLONOSCOPY N/A 5/18/2023    Procedure: COLONOSCOPY;  Surgeon: Cuong Aguiar MD;  Location: [facility] ENDO;  Service: Endoscopy;  Laterality: N/A;    CREATION OF FEMORAL-FEMORAL ARTERY BYPASS WITH GRAFT Bilateral 3/2/2023    Procedure: CREATION, BYPASS, ARTERIAL, FEMORAL TO FEMORAL, USING GRAFT, LEFT FEMORAL ENDARTERECTOMY, LEFT ILIAC ARTERY STENT PLACEMENT, RIGHT LOWER EXTREMITY ANGIOGRAM AND RIGHT SFA  ANGIOPLASTY;  Surgeon: Danny Dunbar MD;  [facility];  Service: Vascular;  Laterality: Bilateral;   744.47 MGY  175.36 Gycm  Flouro time 20.5 mins      CREATION OF ILIOFEMORAL ARTERY BYPASS Right 3/20/2019    Procedure: CREATION, BYPASS, ARTERIAL, ILIAC TO FEMORAL, RIGHT LOWER EXTREMITY, RIGHT PROFUNDAPLASTY;  Surgeon: Danny Dunbar MD;  Location: [facility] OR;  Service: Vascular;  Laterality: Right;  11:00AM START PER NAZANIN RICHARDS PREOP 3/13/2019  ACT'S, CELL SAVER, GRAFTS, BOOK WATER---NEED H/P  CONSENT IN AM------HGBA1C    CREATION OF MUSCLE ROTATIONAL FLAP Left 3/31/2023    Procedure: CREATION, FLAP, MUSCLE ROTATION;  Surgeon: Brandon Smiley MD;  Location: [facility] OR 2ND FLR;  Service: Plastics;  Laterality: Left;    ENDOSCOPY OF PROXIMAL SMALL INTESTINE N/A 5/18/2023    Procedure: ENTEROSCOPY, PROXIMAL;  Surgeon: Cuong Aguiar MD;  Location: [facility] ENDO;  Service: Endoscopy;  Laterality: N/A;    ESOPHAGOGASTRODUODENOSCOPY N/A 3/17/2023    Procedure: EGD (ESOPHAGOGASTRODUODENOSCOPY);  Surgeon: Gee Rodriguez MD;  Location: [facility] ENDO (2ND FLR);  Service: Endoscopy;  Laterality: N/A;    ESOPHAGOGASTRODUODENOSCOPY N/A 4/11/2023    Procedure: EGD (ESOPHAGOGASTRODUODENOSCOPY);  Surgeon: Nazanin Kaiser MD;   Location: [facility] ENDO (2ND FLR);  Service: Endoscopy;  Laterality: N/A;    ESOPHAGOGASTRODUODENOSCOPY N/A 4/26/2023    Procedure: EGD (ESOPHAGOGASTRODUODENOSCOPY);  Surgeon: Gee Rodriguez MD;  Location: [facility] ENDO (2ND FLR);  Service: Endoscopy;  Laterality: N/A;    ESOPHAGOGASTRODUODENOSCOPY N/A 5/2/2023    Procedure: EGD (ESOPHAGOGASTRODUODENOSCOPY);  Surgeon: Cuong Conteh MD;  Location: [facility] ENDO (2ND FLR);  Service: Endoscopy;  Laterality: N/A;    FOOT AMPUTATION THROUGH METATARSAL Right 3/4/2023    Procedure: AMPUTATION, FOOT, TRANSMETATARSAL;  Surgeon: Benjamin Godfrey DPM;  [facility];  Service: Podiatry;  Laterality: Right;    HYSTERECTOMY      ?unsure cervix present    INCISION AND DRAINAGE Right 4/12/2019    Procedure: Incision and Drainage - R groin washout, possible muscle flap;  Surgeon: Danny Dunbar MD;  [facility];  Service: Vascular;  Laterality: Right;  groin washout    INCISION AND DRAINAGE OF GROIN Right 5/3/2019    Procedure: Incision and Drainage R groin;  Surgeon: Danny Dunbar MD;  [facility];  Service: Vascular;  Laterality: Right;    WOUND DEBRIDEMENT Left 3/31/2023    Procedure: DEBRIDEMENT, WOUND;  Surgeon: René Simms MD;  [facility];  Service: Vascular;  Laterality: Left;       Time Tracking:     OT Date of Treatment: 05/19/23  OT Start Time: 1125  OT Stop Time: 1150  OT Total Time (min): 25 min    Billable Minutes:Evaluation 15  Self Care/Home Management 10    5/19/2023

## 2023-05-19 NOTE — PROGRESS NOTES
[facility] Progress Note    Patient Complaint: diarrhea, abnormal labs    PCP:   Juan F Garay       LOS: 3        Initial History of Present Illness (HPI):  This is a 82 y.o. female consulted to GI service for kael red loose stool. 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    at bedside with history. Patient and  reports acute onset of severe watery diarrhea with associated symptoms of blood in stool that began last night for 5 episodes.  reports stool more red than prior black stool episodes. Denies dizziness, weakness, n/v, abdominal pain, black stools or constipation. Denies smoking, drinking, NSAIDs and oac. Had prior upper endoscopy after c/o of anemia and black stool with findings of angiectasias treated with apc.  reports colonoscopy done ~5 yrs ago with polyp removal.    Admit labs hgb 7.1, k 2.1, trop 0.086    Interval Hx  S/p dual endoscopy, report below. Hgb stable. No reports of overt bleeding this am.         Medical History:  has a past medical history of CHF (congestive heart failure), Diabetes mellitus, DVT (deep venous thrombosis), Hypertension, and Miscarriage.    Surgical History:  has a past surgical history that includes Hysterectomy;  femoral vascular surgery ; Creation of iliofemoral artery bypass (Right, 3/20/2019); Incision and drainage (Right, 4/12/2019); Incision and drainage of groin (Right, 5/3/2019); Angiography of lower extremity (Right, 2/23/2023); Creation of femoral-femoral artery bypass with graft (Bilateral, 3/2/2023); Foot amputation through metatarsal (Right, 3/4/2023); Esophagogastroduodenoscopy (N/A, 3/17/2023); Wound debridement (Left, 3/31/2023); Creation of muscle  rotational flap (Left, 3/31/2023); Angiography of lower extremity (Right, 4/4/2023); Esophagogastroduodenoscopy (N/A, 4/11/2023); Esophagogastroduodenoscopy (N/A, 4/26/2023); Esophagogastroduodenoscopy (N/A, 5/2/2023); Colonoscopy (N/A, 5/18/2023); and Endoscopy of proximal small intestine (N/A, 5/18/2023).      Objective Findings:    Vital Signs:  Temp:  [96 °F (35.6 °C)-98.6 °F (37 °C)]   Pulse:  [67-90]   Resp:  [14-19]   BP: ()/(49-90)   SpO2:  [94 %-99 %]   Body mass index is 24.46 kg/m².      Physical Exam  Vitals and nursing note reviewed.   Constitutional:       Appearance: Normal appearance.   HENT:      Head: Normocephalic.   Pulmonary:      Effort: Pulmonary effort is normal.   Abdominal:      General: Bowel sounds are normal.      Palpations: Abdomen is soft.   Skin:     General: Skin is warm and dry.   Neurological:      Mental Status: She is alert and oriented to person, place, and time.   Psychiatric:         Mood and Affect: Mood normal.         Behavior: Behavior normal.         Thought Content: Thought content normal.         Judgment: Judgment normal.             Labs:  Lab Results   Component Value Date    WBC 8.37 05/17/2023    HGB 8.0 (L) 05/19/2023    HCT 20.0 (L) 05/17/2023     05/17/2023    CHOL 109 (L) 02/28/2023    TRIG 58 02/28/2023    HDL 38 (L) 02/28/2023    ALT 7 (L) 05/19/2023    AST 17 05/19/2023     05/19/2023    K 3.4 (L) 05/19/2023     05/19/2023    CREATININE 1.0 05/19/2023    BUN 9 05/19/2023    CO2 28 05/19/2023    TSH 1.304 04/22/2023    INR 1.2 04/04/2023    HGBA1C 8.2 (H) 02/28/2023         Endoscopy: 5/19/23 Small bowel enteroscopy- - Normal esophagus.                          - A single non-bleeding angioectasia in the                          stomach. Treated with argon plasma coagulation                          (APC).                          - Non-bleeding gastric ulcer with no stigmata of                          bleeding.                           - Normal examined duodenum.                          - A single non-bleeding angioectasia in the                          jejunum.                          - No specimens collected.   5/19/23 Colonoscopy-- FMS in place. Removed.                          - Hemorrhagic mucosa in the ascending colon and in                          the cecum. Biopsied. Treated with argon plasma                          coagulation (APC).                          - A single erosion in the rectum from FMS     I have independently reviewed and interpreted the imaging above           Acute blood loss anemia. GI bleed. Diarrhea. Angiectasis. Gastric ulcers. Hypokalemia.    Plan/ Recommendations:  1.  Hgb 8.0 today, K replaced. S/p dual endoscopy with APC treatment of gastic and colonic avm. Denies any overt bleeding this am. Denies any stools since yesterday. C diff negative. Diarrhea subsided. Rec ppi bid. Avoid fecal management devices in future. F/u in GI clinic.    Will sign off  Thank you so much for allowing us to participate in the care of Nelsy Marino . Please contact us if you have any additional questions.    Judy Jonas NP  Gastroenterology  [facility] - Med Surg

## 2023-05-19 NOTE — PLAN OF CARE
Problem: Physical Therapy  Goal: Physical Therapy Goal  Description: Goals to be met by: 23     Patient will increase functional independence with mobility by performin. Pt to be mod I with bed mobility.  2. Pt to transfer with supervision.  3. Pt to ambulate 50' w/RW CGA.  4. Pt to be (I) with HEP.    Outcome: Ongoing, Progressing   Intial eval completed, see in chart for details.

## 2023-05-19 NOTE — NURSING
[facility]  Nurses Note -- 4 Eyes    5/18/2023      Skin assessed on: Q Shift      [x] No Pressure Injuries Present    []Prevention Measures Documented    [] Yes LDA  for Pressure Injury Previously documented     [] Yes New Pressure Injury Discovered   [] LDA for New Pressure Injury Added      Attending RN:  Pippa Lewis, RN     Second RN:  Yue Horn LPN

## 2023-05-19 NOTE — PLAN OF CARE
Problem: Adult Inpatient Plan of Care  Goal: Plan of Care Review  Outcome: Ongoing, Progressing  Flowsheets (Taken 5/19/2023 0254)  Plan of Care Reviewed With:   patient   spouse     Patient remains free from injury and falls this shift. Patient with left lower extremity pain this shift. PRN analgesics administered with relief. IVF infusing. Patient resting comfortably. Spouse at bedside. Current plan of care continued.

## 2023-05-19 NOTE — PT/OT/SLP EVAL
Physical Therapy Evaluation    Patient Name:  Nelsy Marino   MRN:  [id number]    Recommendations:     Discharge Recommendations: nursing facility, skilled (vs HHPT)   Discharge Equipment Recommendations: none       Assessment:     Nelsy Marino is a 82 y.o. female admitted with a medical diagnosis of Hypokalemia.  She presents with the following impairments/functional limitations: weakness, impaired endurance, impaired functional mobility, gait instability, decreased lower extremity function, impaired sensation.    Rehab Prognosis: Good; patient would benefit from acute skilled PT services to address these deficits and reach maximum level of function.    Recent Surgery: Procedure(s) (LRB):  COLONOSCOPY (N/A)  ENTEROSCOPY, PROXIMAL (N/A) 1 Day Post-Op    Plan:     During this hospitalization, patient to be seen 5 x/week to address the identified rehab impairments via gait training, therapeutic activities, therapeutic exercises and progress toward the following goals:    Plan of Care Expires:  05/26/23    Subjective     Chief Complaint: weakness and (L) leg pain  Patient/Family Comments/goals: Pt agreeable to therapy evaluation.  Pain/Comfort:  Pain Rating 1: 6/10  Location - Side 1: Left  Location 1: leg  Pain Addressed 1: Reposition    Patients cultural, spiritual, [employer] conflicts given the current situation: no    Living Environment:  PTA pt lived with her  in a 1 story house with no steps to enter.   Prior to admission, patients level of function was independent.  Equipment used at home: none.  DME owned (not currently used): rolling walker, bedside commode, shower chair, wheelchair, and hospital bed.  Upon discharge, patient will have assistance from .    Objective:     Patient found supine with bed alarm, WILTON drain, PureWick, telemetry  upon PT entry to room.    General Precautions: Standard,    Orthopedic Precautions:N/A   Braces: N/A  Respiratory Status: Room air    Exams:  Cognitive Exam:   Patient is oriented to Person, Place, and Situation  Sensation:    -       Impaired  light/touch decreased left leg  Skin Integrity/Edema:      -       Skin integrity: (R) toe amputation  RLE ROM: WFL  RLE Strength: WFL except ankle NT  LLE ROM: WFL  LLE Strength: grossly 2/5    Functional Mobility:  Bed Mobility:     Supine to Sit: stand by assistance  Transfers:     Sit to Stand:  minimum assistance with rolling walker  Gait: 1' w/RW mod A to chair  Balance: Fair/Fair-      AM-PAC 6 CLICK MOBILITY  Total Score:18       Treatment & Education:  Educated on role of PT and POC, ambulated 2 small steps with RW and (R) post-op shoe to B/S chair.    Patient left  reclined in chair  with all lines intact, call button in reach, and sister present.    GOALS:   Multidisciplinary Problems       Physical Therapy Goals          Problem: Physical Therapy    Goal Priority Disciplines Outcome Goal Variances Interventions   Physical Therapy Goal     PT, PT/OT Ongoing, Progressing     Description: Goals to be met by: 23     Patient will increase functional independence with mobility by performin. Pt to be mod I with bed mobility.  2. Pt to transfer with supervision.  3. Pt to ambulate 50' w/RW CGA.  4. Pt to be (I) with HEP.                         History:     Past Medical History:   Diagnosis Date    CHF (congestive heart failure)     Diabetes mellitus     DVT (deep venous thrombosis)     Hypertension     Miscarriage        Past Surgical History:   Procedure Laterality Date     femoral vascular surgery       ANGIOGRAPHY OF LOWER EXTREMITY Right 2023    Procedure: Angiogram Extremity Unilateral;  Surgeon: Danny Dunbar MD;  Location: [facility] OR;  Service: Vascular;  Laterality: Right;  RN PHONE PREOP 2023----CK CONSENT    ANGIOGRAPHY OF LOWER EXTREMITY Right 2023    Procedure: Angiogram Extremity Unilateral - possible endovascular intervention;  Surgeon: René Simms MD;  Location: [facility] OR [address] [place] [address];   Service: Vascular;  Laterality: Right;  19.6 mins.  740.81 mGy  73.3399 Gy.cm  58ml dye  local - 1ml    COLONOSCOPY N/A 5/18/2023    Procedure: COLONOSCOPY;  Surgeon: Cuong Aguiar MD;  Location: [facility] ENDO;  Service: Endoscopy;  Laterality: N/A;    CREATION OF FEMORAL-FEMORAL ARTERY BYPASS WITH GRAFT Bilateral 3/2/2023    Procedure: CREATION, BYPASS, ARTERIAL, FEMORAL TO FEMORAL, USING GRAFT, LEFT FEMORAL ENDARTERECTOMY, LEFT ILIAC ARTERY STENT PLACEMENT, RIGHT LOWER EXTREMITY ANGIOGRAM AND RIGHT SFA  ANGIOPLASTY;  Surgeon: Danny Dunbar MD;  Location: [facility] OR 2ND FLR;  Service: Vascular;  Laterality: Bilateral;   744.47 MGY  175.36 Gycm  Flouro time 20.5 mins      CREATION OF ILIOFEMORAL ARTERY BYPASS Right 3/20/2019    Procedure: CREATION, BYPASS, ARTERIAL, ILIAC TO FEMORAL, RIGHT LOWER EXTREMITY, RIGHT PROFUNDAPLASTY;  Surgeon: Danny Dunbar MD;  Location: [facility] OR;  Service: Vascular;  Laterality: Right;  11:00AM START PER NAZANIN RICHARDS PREOP 3/13/2019  ACT'S, CELL SAVER, GRAFTS, BOOK WATER---NEED H/P  CONSENT IN AM------HGBA1C    CREATION OF MUSCLE ROTATIONAL FLAP Left 3/31/2023    Procedure: CREATION, FLAP, MUSCLE ROTATION;  Surgeon: Brandon Smiley MD;  Location: [facility] OR 2ND FLR;  Service: Plastics;  Laterality: Left;    ENDOSCOPY OF PROXIMAL SMALL INTESTINE N/A 5/18/2023    Procedure: ENTEROSCOPY, PROXIMAL;  Surgeon: Cuong Aguiar MD;  Location: [facility] ENDO;  Service: Endoscopy;  Laterality: N/A;    ESOPHAGOGASTRODUODENOSCOPY N/A 3/17/2023    Procedure: EGD (ESOPHAGOGASTRODUODENOSCOPY);  Surgeon: Gee Rodriguez MD;  Location: [facility] ENDO (2ND FLR);  Service: Endoscopy;  Laterality: N/A;    ESOPHAGOGASTRODUODENOSCOPY N/A 4/11/2023    Procedure: EGD (ESOPHAGOGASTRODUODENOSCOPY);  Surgeon: Nazanin Kaiser MD;  Location: [facility] ENDO (2ND FLR);  Service: Endoscopy;  Laterality: N/A;    ESOPHAGOGASTRODUODENOSCOPY N/A 4/26/2023    Procedure: EGD (ESOPHAGOGASTRODUODENOSCOPY);   Surgeon: Gee Rodriguez MD;  Location: [facility] ENDO (2ND FLR);  Service: Endoscopy;  Laterality: N/A;    ESOPHAGOGASTRODUODENOSCOPY N/A 5/2/2023    Procedure: EGD (ESOPHAGOGASTRODUODENOSCOPY);  Surgeon: Cuong Conteh MD;  Location: [facility] ENDO (2ND FLR);  Service: Endoscopy;  Laterality: N/A;    FOOT AMPUTATION THROUGH METATARSAL Right 3/4/2023    Procedure: AMPUTATION, FOOT, TRANSMETATARSAL;  Surgeon: Benjamin Godfrey DPM;  [facility];  Service: Podiatry;  Laterality: Right;    HYSTERECTOMY      ?unsure cervix present    INCISION AND DRAINAGE Right 4/12/2019    Procedure: Incision and Drainage - R groin washout, possible muscle flap;  Surgeon: Danny Dunbar MD;  [facility];  Service: Vascular;  Laterality: Right;  groin washout    INCISION AND DRAINAGE OF GROIN Right 5/3/2019    Procedure: Incision and Drainage R groin;  Surgeon: Danny Dunbar MD;  [facility];  Service: Vascular;  Laterality: Right;    WOUND DEBRIDEMENT Left 3/31/2023    Procedure: DEBRIDEMENT, WOUND;  Surgeon: René Simms MD;  [facility];  Service: Vascular;  Laterality: Left;       Time Tracking:     PT Received On: 05/19/23  PT Start Time: 1128     PT Stop Time: 1145  PT Total Time (min): 17 min     Billable Minutes: Evaluation 17      05/19/2023

## 2023-05-19 NOTE — PROGRESS NOTES
Sitting up in chair at bedside visiting with sister. Complained of pain left lower leg with extension of leg on footrest of recliner. Encouraged patient to gently extend leg and avoid chetan leg continuously.   Dressings in place to lower extremities. TN made appointment for follow up with Plastics for WILTON drain/staple removal.   Nursing to change dressings as per treatment plan.

## 2023-05-20 LAB
ACID FAST MOD KINY STN SPEC: NORMAL
ALBUMIN SERPL BCP-MCNC: 2.1 G/DL (ref 3.5–5.2)
ALP SERPL-CCNC: 44 U/L (ref 55–135)
ALT SERPL W/O P-5'-P-CCNC: 10 U/L (ref 10–44)
ANION GAP SERPL CALC-SCNC: 9 MMOL/L (ref 8–16)
AST SERPL-CCNC: 21 U/L (ref 10–40)
BILIRUB SERPL-MCNC: 0.5 MG/DL (ref 0.1–1)
BUN SERPL-MCNC: 10 MG/DL (ref 8–23)
CALCIUM SERPL-MCNC: 8 MG/DL (ref 8.7–10.5)
CHLORIDE SERPL-SCNC: 104 MMOL/L (ref 95–110)
CO2 SERPL-SCNC: 26 MMOL/L (ref 23–29)
CREAT SERPL-MCNC: 1 MG/DL (ref 0.5–1.4)
EST. GFR  (NO RACE VARIABLE): 56 ML/MIN/1.73 M^2
GLUCOSE SERPL-MCNC: 132 MG/DL (ref 70–110)
MAGNESIUM SERPL-MCNC: 1.7 MG/DL (ref 1.6–2.6)
MYCOBACTERIUM SPEC QL CULT: NORMAL
POCT GLUCOSE: 265 MG/DL (ref 70–110)
POTASSIUM SERPL-SCNC: 4 MMOL/L (ref 3.5–5.1)
PROT SERPL-MCNC: 5.7 G/DL (ref 6–8.4)
SODIUM SERPL-SCNC: 139 MMOL/L (ref 136–145)

## 2023-05-20 PROCEDURE — 86580 TB INTRADERMAL TEST: CPT | Performed by: INTERNAL MEDICINE

## 2023-05-20 PROCEDURE — 25000003 PHARM REV CODE 250: Performed by: INTERNAL MEDICINE

## 2023-05-20 PROCEDURE — 83735 ASSAY OF MAGNESIUM: CPT | Performed by: STUDENT IN AN ORGANIZED HEALTH CARE EDUCATION/TRAINING PROGRAM

## 2023-05-20 PROCEDURE — 11000001 HC ACUTE MED/SURG PRIVATE ROOM

## 2023-05-20 PROCEDURE — 30200315 PPD INTRADERMAL TEST REV CODE 302: Performed by: INTERNAL MEDICINE

## 2023-05-20 PROCEDURE — 25000003 PHARM REV CODE 250: Performed by: STUDENT IN AN ORGANIZED HEALTH CARE EDUCATION/TRAINING PROGRAM

## 2023-05-20 PROCEDURE — 80053 COMPREHEN METABOLIC PANEL: CPT | Performed by: STUDENT IN AN ORGANIZED HEALTH CARE EDUCATION/TRAINING PROGRAM

## 2023-05-20 RX ADMIN — INSULIN DETEMIR 4 UNITS: 100 INJECTION, SOLUTION SUBCUTANEOUS at 08:05

## 2023-05-20 RX ADMIN — CARVEDILOL 25 MG: 12.5 TABLET, FILM COATED ORAL at 08:05

## 2023-05-20 RX ADMIN — ATORVASTATIN CALCIUM 40 MG: 40 TABLET, FILM COATED ORAL at 08:05

## 2023-05-20 RX ADMIN — MUPIROCIN: 20 OINTMENT TOPICAL at 08:05

## 2023-05-20 RX ADMIN — DULOXETINE 30 MG: 30 CAPSULE, DELAYED RELEASE ORAL at 08:05

## 2023-05-20 RX ADMIN — HYDRALAZINE HYDROCHLORIDE 75 MG: 25 TABLET, FILM COATED ORAL at 02:05

## 2023-05-20 RX ADMIN — CARVEDILOL 25 MG: 12.5 TABLET, FILM COATED ORAL at 04:05

## 2023-05-20 RX ADMIN — SODIUM CHLORIDE: 9 INJECTION, SOLUTION INTRAVENOUS at 03:05

## 2023-05-20 RX ADMIN — Medication 6 MG: at 08:05

## 2023-05-20 RX ADMIN — PANTOPRAZOLE SODIUM 40 MG: 40 TABLET, DELAYED RELEASE ORAL at 08:05

## 2023-05-20 RX ADMIN — OXYCODONE HYDROCHLORIDE 5 MG: 5 TABLET ORAL at 02:05

## 2023-05-20 RX ADMIN — HYDRALAZINE HYDROCHLORIDE 75 MG: 25 TABLET, FILM COATED ORAL at 09:05

## 2023-05-20 RX ADMIN — OXYCODONE HYDROCHLORIDE 5 MG: 5 TABLET ORAL at 08:05

## 2023-05-20 RX ADMIN — HYDRALAZINE HYDROCHLORIDE 75 MG: 25 TABLET, FILM COATED ORAL at 05:05

## 2023-05-20 RX ADMIN — AMLODIPINE BESYLATE 10 MG: 5 TABLET ORAL at 08:05

## 2023-05-20 RX ADMIN — TUBERCULIN PURIFIED PROTEIN DERIVATIVE 5 UNITS: 5 INJECTION, SOLUTION INTRADERMAL at 11:05

## 2023-05-20 NOTE — PLAN OF CARE
Problem: Adult Inpatient Plan of Care  Goal: Plan of Care Review  Outcome: Ongoing, Progressing  Flowsheets (Taken 5/20/2023 1838)  Plan of Care Reviewed With: patient  Goal: Patient-Specific Goal (Individualized)  Outcome: Ongoing, Progressing  Goal: Absence of Hospital-Acquired Illness or Injury  Outcome: Ongoing, Progressing  Intervention: Identify and Manage Fall Risk  Flowsheets (Taken 5/20/2023 1838)  Safety Promotion/Fall Prevention:   assistive device/personal item within reach   high risk medications identified   nonskid shoes/socks when out of bed   room near unit station   side rails raised x 2   instructed to call staff for mobility   medications reviewed   Fall Risk reviewed with patient/family  Intervention: Prevent Skin Injury  Flowsheets (Taken 5/20/2023 1838)  Body Position:   position changed independently   weight shifting  Skin Protection:   adhesive use limited   tubing/devices free from skin contact  Intervention: Prevent and Manage VTE (Venous Thromboembolism) Risk  Flowsheets (Taken 5/20/2023 1838)  Activity Management:   Ankle pumps - L1   Arm raise - L1   Rolling - L1   Straight leg raise - L1  VTE Prevention/Management:   ambulation promoted   bleeding precations maintained   bleeding risk assessed  Range of Motion: active ROM (range of motion) encouraged  Intervention: Prevent Infection  Flowsheets (Taken 5/20/2023 1838)  Infection Prevention: hand hygiene promoted  Goal: Optimal Comfort and Wellbeing  Outcome: Ongoing, Progressing  Goal: Readiness for Transition of Care  Outcome: Ongoing, Progressing     Problem: Fall Injury Risk  Goal: Absence of Fall and Fall-Related Injury  Outcome: Ongoing, Progressing  Intervention: Identify and Manage Contributors  Flowsheets (Taken 5/20/2023 1838)  Self-Care Promotion:   independence encouraged   BADL personal objects within reach   BADL personal routines maintained   meal set-up provided   safe use of adaptive equipment encouraged  Medication  Review/Management:   medications reviewed   high-risk medications identified  Intervention: Promote Injury-Free Environment  Flowsheets (Taken 5/20/2023 1838)  Safety Promotion/Fall Prevention:   assistive device/personal item within reach   high risk medications identified   nonskid shoes/socks when out of bed   room near unit station   side rails raised x 2   instructed to call staff for mobility   medications reviewed   Fall Risk reviewed with patient/family     Problem: Skin Injury Risk Increased  Goal: Skin Health and Integrity  Outcome: Ongoing, Progressing  Intervention: Optimize Skin Protection  Flowsheets (Taken 5/20/2023 1838)  Pressure Reduction Techniques:   frequent weight shift encouraged   weight shift assistance provided   pressure points protected  Pressure Reduction Devices: foam padding utilized  Skin Protection:   adhesive use limited   tubing/devices free from skin contact  Head of Bed (HOB) Positioning: HOB at 30-45 degrees     Problem: Diabetes Comorbidity  Goal: Blood Glucose Level Within Targeted Range  Outcome: Ongoing, Progressing     Problem: Adjustment to Illness (Sepsis/Septic Shock)  Goal: Optimal Coping  Outcome: Ongoing, Progressing     Problem: Bleeding (Sepsis/Septic Shock)  Goal: Absence of Bleeding  Outcome: Ongoing, Progressing     Problem: Glycemic Control Impaired (Sepsis/Septic Shock)  Goal: Blood Glucose Level Within Desired Range  Outcome: Ongoing, Progressing     Problem: Infection Progression (Sepsis/Septic Shock)  Goal: Absence of Infection Signs and Symptoms  Outcome: Ongoing, Progressing     Problem: Nutrition Impaired (Sepsis/Septic Shock)  Goal: Optimal Nutrition Intake  Outcome: Ongoing, Progressing     Problem: Fluid and Electrolyte Imbalance (Acute Kidney Injury/Impairment)  Goal: Fluid and Electrolyte Balance  Outcome: Ongoing, Progressing     Problem: Oral Intake Inadequate (Acute Kidney Injury/Impairment)  Goal: Optimal Nutrition Intake  Outcome: Ongoing,  Progressing     Problem: Impaired Wound Healing  Goal: Optimal Wound Healing  Outcome: Ongoing, Progressing  Intervention: Promote Wound Healing  Flowsheets (Taken 5/20/2023 1838)  Sleep/Rest Enhancement: relaxation techniques promoted  Activity Management:   Ankle pumps - L1   Arm raise - L1   Rolling - L1   Straight leg raise - L1

## 2023-05-20 NOTE — PROGRESS NOTES
[facility] Medicine  Progress Note    Patient Name: Nelsy Marino  MRN: [id number]  Patient Class: IP- Inpatient   Admission Date: 5/16/2023  Length of Stay: 4 days  Attending Physician: Jacobo Phillips MD  Primary Care Provider: Juan F Garay        Subjective:     Principal Problem:Hypokalemia        HPI:  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,who presented to the ED at the account of his primary physician after her home health nurse sent routine labs during her follow up,serum K was 2.1, she voiced ongoing diarrhea x 5 days,said to be multiple time daily,she's not walking as she's trying to take pressure of her legs per her surgeon, she voiced associated abdominal pain this morning,said to be generalized with nausea but no vomiting reported,she denied fever,chills or rigor,denied increase drainage from her her left hip surgical drain.She has no urinary symptom of dysuria,frequency,urgency,straining at micturition or hematuria,currently on zosyn with the stop day per spouse tomorrow 5/17/2023.  Labs at presentation showed serun K 2.1,Na 146,BUN/Cr 13/1.1, with H/H 7.1/21.8      Overview/Hospital Course:  Patient admitted to the hospital for hypokalemia- found on routing labs at home with H/H. K replaced. Noted to have a GI bleed. Transfused one unit of blood on 5/17. GI consulted. Hypo K likely from diarrhea. C-diff sent- negative. 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 C-scope showed hemorrhage in ascending colon- treated. H/H remained stable. PT/OT consulted- and recommended SNF.   consulted. PPD placed.  Hgb stable      Interval History: No new issues     Review of Systems   Constitutional:  Negative for activity change.   HENT:  Negative for congestion.    Respiratory:  Negative for chest tightness.    Genitourinary:  Negative for difficulty urinating.   Neurological:  Negative for dizziness.   Objective:     Vital Signs (Most Recent):  Temp: 97.6 °F (36.4 °C) (05/20/23 0737)  Pulse: 80 (05/20/23 0737)  Resp: 18 (05/20/23 0737)  BP: (!) 145/66 (05/20/23 0737)  SpO2: 96 % (05/20/23 0737) Vital Signs (24h Range):  Temp:  [97.6 °F (36.4 °C)-98.5 °F (36.9 °C)] 97.6 °F (36.4 °C)  Pulse:  [76-90] 80  Resp:  [16-18] 18  SpO2:  [93 %-96 %] 96 %  BP: (121-160)/(58-70) 145/66     Weight: 68.4 kg (150 lb 12.7 oz)  Body mass index is 25.09 kg/m².    Intake/Output Summary (Last 24 hours) at 5/20/2023 1004  Last data filed at 5/20/2023 0700  Gross per 24 hour   Intake 240 ml   Output 1100 ml   Net -860 ml         Physical Exam  Constitutional:       Appearance: She is obese.   Cardiovascular:      Rate and Rhythm: Normal rate and regular rhythm.   Pulmonary:      Effort: No respiratory distress.   Skin:     General: Skin is dry.   Neurological:      Mental Status: She is alert and oriented to person, place, and time.           Significant Labs: All pertinent labs within the past 24 hours have been reviewed.  BMP:   Recent Labs   Lab 05/20/23  0617   *      K 4.0      CO2 26   BUN 10   CREATININE 1.0   CALCIUM 8.0*   MG 1.7     CBC:   Recent Labs   Lab 05/18/23  1613 05/18/23  2353 05/19/23  0931   HGB 9.3* 7.2* 8.0*       Significant Imaging:       Assessment/Plan:      * Hypokalemia  Patient sent here by PCP for hypokalemia noticed on routine blood work  She has been on chronic antibiotics for suppression therapy following femoral-femoral bilateral artery bypass with graft  Currently on zosyn,but has been having diarrhea for the past 4 days  Hypokalemia likely due to GI loss  Will  replete K and work up cause of diarrhea.    Today at 3.3. Will replace further- replace Mag    resolved      Hypomagnesemia  Serum Mg is low likely related to ongoing diarrhea  Will replete serum Mg      Diarrhea of presumed infectious origin  Patient on zosyn possibly for suppression therapy  Post femoral-femoral artery by pass with graft  Voiced mild abdominal pain,wbc within normal limit  No use of laxatives resently  Will r/o C.diff at this time  Long hospital stay,recently discharged    C-diff negative       Vitamin D deficiency  History of Vitamin D def  Lat vitamin 25 -oh was 20 (4/24/23)  Continue vitamin D supplementation,50,000 units weekly of ergocalciferol x 12 weeks      Hypernatremia  Serum Na of 146  Likely related to reduced intake in the setting of ongoing diarrhea  Free water deficit of 1.3 liters, will correct over the next 24 hours with 5% dextrose water  Will monitor BMP 2 12 hours x 2    History of DVT (deep vein thrombosis)  History of DVT  But off Eliquis due to significant GI bleed  Will continue to monitor  Consider dvt prophylaxis at this time,while inpatient and barely moving.      Diastolic CHF, chronic  Patient is identified as having Diastolic (HFpEF) heart failure that is Chronic. CHF is currently controlled. Latest ECHO performed and demonstrates- Results for orders placed during the hospital encounter of 02/27/23    Echo    Interpretation Summary  · The estimated ejection fraction is 65%.  · The left ventricle is normal in size with normal systolic function.  · Grade II left ventricular diastolic dysfunction.  · Normal right ventricular size with normal right ventricular systolic function.  · Mild left atrial enlargement.  · Mild-to-moderate mitral regurgitation.  · There is pulmonary hypertension.  · The estimated PA systolic pressure is 55 mmHg.  · Intermediate central venous pressure (8 mmHg).  . Continue Beta Blocker and Furosemide and monitor clinical status closely. Monitor on  telemetry. Patient is off CHF pathway.  Monitor strict Is&Os and daily weights.  Place on fluid restriction of 1.5 L. Continue to stress to patient importance of self efficacy and  on diet for CHF. Last BNP reviewed- and noted below   Recent Labs   Lab 05/16/23  1410   *   .  Not in heart failure exacerbation at this time.no orthopnea,PND or pedal swelling  No elevation in JVD    Type 2 diabetes mellitus with chronic kidney disease, with long-term current use of insulin  Type 2 DM with hyperglycemia  Start basal,bolus and correctional insulin  Patient's FSGs are uncontrolled due to hyperglycemia on current medication regimen.  Last A1c reviewed-   Lab Results   Component Value Date    HGBA1C 8.2 (H) 02/28/2023     Most recent fingerstick glucose reviewed-   Recent Labs   Lab 05/16/23  1255   POCTGLUCOSE 134*     Current correctional scale  Medium  Maintain anti-hyperglycemic dose as follows-   Antihyperglycemics (From admission, onward)    Start     Stop Route Frequency Ordered    05/16/23 2100  insulin detemir U-100 (Levemir) pen 4 Units         -- SubQ Nightly 05/16/23 1604        Hold Oral hypoglycemics while patient is in the hospital.    Essential hypertension  Sub optimal BP control  /65 mmhg  Continue home antihypertensive - amlodipine and carvedilol  Continue lifestyle modification  Low salt diet.    Adjusting meds    PAD (peripheral artery disease)  History of peripheral artery disease s/p femora-femoral bilateral artery bypass with graft and  Right Iliofemoral artery bypass.  With right foot ray amputation for ? osteomyelitis  Continue statin  She could not take eliquis due to previous GI bleed  Will continue antibiotics  Vascular consult to review while in patient      Debility- PT/OT rec: SNF  VTE Risk Mitigation (From admission, onward)         Ordered     IP VTE HIGH RISK PATIENT  Once         05/16/23 1604     Place sequential compression device  Until discontinued         05/16/23  1604                Discharge Planning   JUAN ALBERTO:      Code Status: Full Code   Is the patient medically ready for discharge?:     Reason for patient still in hospital (select all that apply): Patient unstable  Discharge Plan A: Home with family                  Jacobo Norris MD  Department of Hospital Medicine   [facility]

## 2023-05-20 NOTE — PLAN OF CARE
Problem: Adult Inpatient Plan of Care  Goal: Plan of Care Review  Outcome: Ongoing, Progressing  Flowsheets (Taken 5/20/2023 0457)  Plan of Care Reviewed With:   patient   spouse  Goal: Optimal Comfort and Wellbeing  Outcome: Ongoing, Progressing     Problem: Fall Injury Risk  Goal: Absence of Fall and Fall-Related Injury  Outcome: Ongoing, Progressing  Intervention: Promote Injury-Free Environment  Flowsheets (Taken 5/20/2023 0457)  Safety Promotion/Fall Prevention:   assistive device/personal item within reach   bed alarm set   Fall Risk reviewed with patient/family   family to remain at bedside   medications reviewed   nonskid shoes/socks when out of bed   side rails raised x 2   instructed to call staff for mobility     Problem: Skin Injury Risk Increased  Goal: Skin Health and Integrity  Outcome: Ongoing, Progressing     Problem: Diabetes Comorbidity  Goal: Blood Glucose Level Within Targeted Range  Outcome: Ongoing, Progressing  Intervention: Monitor and Manage Glycemia  Flowsheets (Taken 5/20/2023 0457)  Glycemic Management: blood glucose monitored     Problem: Adjustment to Illness (Sepsis/Septic Shock)  Goal: Optimal Coping  Outcome: Ongoing, Progressing     Problem: Impaired Wound Healing  Goal: Optimal Wound Healing  Outcome: Ongoing, Progressing  POC reviewed with patient and spouse at bedside, verbalized understanding. Patient AAOX4; VSS. Continuous cardiac monitoring ongoing. Pain controlled with current pain med regimen. No acute events overnight. WILTON drain to bulb suction. Fall and aspiration precautions maintained. Bed locked and in lowest position. Side rails up and locked X2. Bed alarm set and audible; call light and personal belongings within reach. All questions and concerns addressed. See flow sheet for full assessment and V/S info. Pt instructed to call for assistance, verbalized understanding.

## 2023-05-20 NOTE — NURSING
[facility]  Nurses Note -- 4 Eyes      5/20/2023       Skin assessed on: Q Shift      [] No Pressure Injuries Present    []Prevention Measures Documented    [x] Yes LDA  for Pressure Injury Previously documented     [] Yes New Pressure Injury Discovered   [] LDA for New Pressure Injury Added      Attending RN:  Yue Horn LPN     Second RN:  SUSIE Christensen

## 2023-05-20 NOTE — SUBJECTIVE & OBJECTIVE
Interval History: No new issues     Review of Systems   Constitutional:  Negative for activity change.   HENT:  Negative for congestion.    Respiratory:  Negative for chest tightness.    Genitourinary:  Negative for difficulty urinating.   Neurological:  Negative for dizziness.   Objective:     Vital Signs (Most Recent):  Temp: 97.6 °F (36.4 °C) (05/20/23 0737)  Pulse: 80 (05/20/23 0737)  Resp: 18 (05/20/23 0737)  BP: (!) 145/66 (05/20/23 0737)  SpO2: 96 % (05/20/23 0737) Vital Signs (24h Range):  Temp:  [97.6 °F (36.4 °C)-98.5 °F (36.9 °C)] 97.6 °F (36.4 °C)  Pulse:  [76-90] 80  Resp:  [16-18] 18  SpO2:  [93 %-96 %] 96 %  BP: (121-160)/(58-70) 145/66     Weight: 68.4 kg (150 lb 12.7 oz)  Body mass index is 25.09 kg/m².    Intake/Output Summary (Last 24 hours) at 5/20/2023 1004  Last data filed at 5/20/2023 0700  Gross per 24 hour   Intake 240 ml   Output 1100 ml   Net -860 ml         Physical Exam  Constitutional:       Appearance: She is obese.   Cardiovascular:      Rate and Rhythm: Normal rate and regular rhythm.   Pulmonary:      Effort: No respiratory distress.   Skin:     General: Skin is dry.   Neurological:      Mental Status: She is alert and oriented to person, place, and time.           Significant Labs: All pertinent labs within the past 24 hours have been reviewed.  BMP:   Recent Labs   Lab 05/20/23  0617   *      K 4.0      CO2 26   BUN 10   CREATININE 1.0   CALCIUM 8.0*   MG 1.7     CBC:   Recent Labs   Lab 05/18/23  1613 05/18/23  2353 05/19/23  0931   HGB 9.3* 7.2* 8.0*       Significant Imaging:

## 2023-05-21 LAB
ALBUMIN SERPL BCP-MCNC: 2.1 G/DL (ref 3.5–5.2)
ALP SERPL-CCNC: 45 U/L (ref 55–135)
ALT SERPL W/O P-5'-P-CCNC: 12 U/L (ref 10–44)
ANION GAP SERPL CALC-SCNC: 9 MMOL/L (ref 8–16)
AST SERPL-CCNC: 24 U/L (ref 10–40)
BILIRUB SERPL-MCNC: 0.4 MG/DL (ref 0.1–1)
BLD PROD TYP BPU: NORMAL
BLOOD UNIT EXPIRATION DATE: NORMAL
BLOOD UNIT TYPE CODE: 7300
BLOOD UNIT TYPE: NORMAL
BUN SERPL-MCNC: 10 MG/DL (ref 8–23)
CALCIUM SERPL-MCNC: 8.1 MG/DL (ref 8.7–10.5)
CHLORIDE SERPL-SCNC: 107 MMOL/L (ref 95–110)
CO2 SERPL-SCNC: 24 MMOL/L (ref 23–29)
CODING SYSTEM: NORMAL
CREAT SERPL-MCNC: 0.9 MG/DL (ref 0.5–1.4)
CROSSMATCH INTERPRETATION: NORMAL
DISPENSE STATUS: NORMAL
EST. GFR  (NO RACE VARIABLE): >60 ML/MIN/1.73 M^2
GLUCOSE SERPL-MCNC: 127 MG/DL (ref 70–110)
HGB BLD-MCNC: 6.8 G/DL (ref 12–16)
MAGNESIUM SERPL-MCNC: 1.6 MG/DL (ref 1.6–2.6)
POTASSIUM SERPL-SCNC: 3.8 MMOL/L (ref 3.5–5.1)
PROT SERPL-MCNC: 5.6 G/DL (ref 6–8.4)
SODIUM SERPL-SCNC: 140 MMOL/L (ref 136–145)
TRANS ERYTHROCYTES VOL PATIENT: NORMAL ML

## 2023-05-21 PROCEDURE — 36415 COLL VENOUS BLD VENIPUNCTURE: CPT | Performed by: HOSPITALIST

## 2023-05-21 PROCEDURE — 85018 HEMOGLOBIN: CPT | Performed by: INTERNAL MEDICINE

## 2023-05-21 PROCEDURE — 86900 BLOOD TYPING SEROLOGIC ABO: CPT | Performed by: HOSPITALIST

## 2023-05-21 PROCEDURE — 25000003 PHARM REV CODE 250: Performed by: HOSPITALIST

## 2023-05-21 PROCEDURE — 63600175 PHARM REV CODE 636 W HCPCS: Performed by: HOSPITALIST

## 2023-05-21 PROCEDURE — 86902 BLOOD TYPE ANTIGEN DONOR EA: CPT | Performed by: HOSPITALIST

## 2023-05-21 PROCEDURE — 86922 COMPATIBILITY TEST ANTIGLOB: CPT | Performed by: HOSPITALIST

## 2023-05-21 PROCEDURE — 83735 ASSAY OF MAGNESIUM: CPT | Performed by: STUDENT IN AN ORGANIZED HEALTH CARE EDUCATION/TRAINING PROGRAM

## 2023-05-21 PROCEDURE — 25000003 PHARM REV CODE 250: Performed by: STUDENT IN AN ORGANIZED HEALTH CARE EDUCATION/TRAINING PROGRAM

## 2023-05-21 PROCEDURE — 11000001 HC ACUTE MED/SURG PRIVATE ROOM

## 2023-05-21 PROCEDURE — 25000003 PHARM REV CODE 250: Performed by: INTERNAL MEDICINE

## 2023-05-21 PROCEDURE — 36430 TRANSFUSION BLD/BLD COMPNT: CPT

## 2023-05-21 PROCEDURE — P9021 RED BLOOD CELLS UNIT: HCPCS | Performed by: HOSPITALIST

## 2023-05-21 PROCEDURE — 80053 COMPREHEN METABOLIC PANEL: CPT | Performed by: STUDENT IN AN ORGANIZED HEALTH CARE EDUCATION/TRAINING PROGRAM

## 2023-05-21 RX ORDER — POTASSIUM CHLORIDE 20 MEQ/1
40 TABLET, EXTENDED RELEASE ORAL ONCE
Status: COMPLETED | OUTPATIENT
Start: 2023-05-21 | End: 2023-05-21

## 2023-05-21 RX ORDER — HYDROCODONE BITARTRATE AND ACETAMINOPHEN 500; 5 MG/1; MG/1
TABLET ORAL
Status: DISCONTINUED | OUTPATIENT
Start: 2023-05-21 | End: 2023-05-22 | Stop reason: HOSPADM

## 2023-05-21 RX ORDER — AMOXICILLIN 250 MG
1 CAPSULE ORAL DAILY PRN
Status: DISCONTINUED | OUTPATIENT
Start: 2023-05-21 | End: 2023-05-22 | Stop reason: HOSPADM

## 2023-05-21 RX ORDER — POLYETHYLENE GLYCOL 3350 17 G/17G
17 POWDER, FOR SOLUTION ORAL DAILY
Status: DISCONTINUED | OUTPATIENT
Start: 2023-05-21 | End: 2023-05-21

## 2023-05-21 RX ORDER — MAGNESIUM SULFATE HEPTAHYDRATE 40 MG/ML
2 INJECTION, SOLUTION INTRAVENOUS ONCE
Status: COMPLETED | OUTPATIENT
Start: 2023-05-21 | End: 2023-05-21

## 2023-05-21 RX ADMIN — POTASSIUM CHLORIDE 40 MEQ: 1500 TABLET, EXTENDED RELEASE ORAL at 02:05

## 2023-05-21 RX ADMIN — PANTOPRAZOLE SODIUM 40 MG: 40 TABLET, DELAYED RELEASE ORAL at 08:05

## 2023-05-21 RX ADMIN — HYDRALAZINE HYDROCHLORIDE 75 MG: 25 TABLET, FILM COATED ORAL at 05:05

## 2023-05-21 RX ADMIN — CARVEDILOL 25 MG: 12.5 TABLET, FILM COATED ORAL at 04:05

## 2023-05-21 RX ADMIN — MUPIROCIN: 20 OINTMENT TOPICAL at 08:05

## 2023-05-21 RX ADMIN — MUPIROCIN: 20 OINTMENT TOPICAL at 09:05

## 2023-05-21 RX ADMIN — SODIUM CHLORIDE: 9 INJECTION, SOLUTION INTRAVENOUS at 12:05

## 2023-05-21 RX ADMIN — PANTOPRAZOLE SODIUM 40 MG: 40 TABLET, DELAYED RELEASE ORAL at 09:05

## 2023-05-21 RX ADMIN — INSULIN DETEMIR 4 UNITS: 100 INJECTION, SOLUTION SUBCUTANEOUS at 09:05

## 2023-05-21 RX ADMIN — HYDRALAZINE HYDROCHLORIDE 75 MG: 25 TABLET, FILM COATED ORAL at 02:05

## 2023-05-21 RX ADMIN — DULOXETINE 30 MG: 30 CAPSULE, DELAYED RELEASE ORAL at 08:05

## 2023-05-21 RX ADMIN — HYDRALAZINE HYDROCHLORIDE 75 MG: 25 TABLET, FILM COATED ORAL at 09:05

## 2023-05-21 RX ADMIN — SENNOSIDES AND DOCUSATE SODIUM 1 TABLET: 50; 8.6 TABLET ORAL at 02:05

## 2023-05-21 RX ADMIN — ATORVASTATIN CALCIUM 40 MG: 40 TABLET, FILM COATED ORAL at 09:05

## 2023-05-21 RX ADMIN — DULOXETINE 30 MG: 30 CAPSULE, DELAYED RELEASE ORAL at 09:05

## 2023-05-21 RX ADMIN — MAGNESIUM SULFATE HEPTAHYDRATE 2 G: 40 INJECTION, SOLUTION INTRAVENOUS at 02:05

## 2023-05-21 RX ADMIN — AMLODIPINE BESYLATE 10 MG: 5 TABLET ORAL at 08:05

## 2023-05-21 RX ADMIN — SODIUM CHLORIDE: 9 INJECTION, SOLUTION INTRAVENOUS at 08:05

## 2023-05-21 RX ADMIN — OXYCODONE HYDROCHLORIDE 5 MG: 5 TABLET ORAL at 09:05

## 2023-05-21 RX ADMIN — CARVEDILOL 25 MG: 12.5 TABLET, FILM COATED ORAL at 08:05

## 2023-05-21 RX ADMIN — OXYCODONE HYDROCHLORIDE 5 MG: 5 TABLET ORAL at 12:05

## 2023-05-21 RX ADMIN — Medication 6 MG: at 09:05

## 2023-05-21 NOTE — SUBJECTIVE & OBJECTIVE
Interval History: Patient HDS and afebile. No acute events overnight. No new complaints this morning. No more bleeding. Hb 6.8, 1 u PRBC ordered. Pt with peripheral vascular disease s/p femoral-femoral bilateral artery bypass with graft 3/2/23, on chronic antibiotics for suppression. Still has staples in thigh and WILTON drain in place. Will consult vascular surgery for futher recs has she has not been since by vascular surgery in the outpatient setting. pending placement.     Review of Systems   Constitutional:  Positive for activity change and fatigue. Negative for fever.   Respiratory:  Negative for shortness of breath.    Cardiovascular:  Negative for chest pain.   Gastrointestinal:  Negative for abdominal pain.   Neurological:  Negative for dizziness and headaches.   All other systems reviewed and are negative.  Objective:     Vital Signs (Most Recent):  Temp: 98.4 °F (36.9 °C) (05/21/23 1155)  Pulse: 92 (05/21/23 1155)  Resp: 18 (05/21/23 1155)  BP: 135/60 (05/21/23 1155)  SpO2: 95 % (05/21/23 1155) Vital Signs (24h Range):  Temp:  [97.6 °F (36.4 °C)-98.4 °F (36.9 °C)] 98.4 °F (36.9 °C)  Pulse:  [81-94] 92  Resp:  [17-20] 18  SpO2:  [94 %-99 %] 95 %  BP: (105-148)/(54-67) 135/60     Weight: 68.4 kg (150 lb 12.7 oz)  Body mass index is 25.09 kg/m².    Intake/Output Summary (Last 24 hours) at 5/21/2023 1208  Last data filed at 5/21/2023 0830  Gross per 24 hour   Intake 2690.49 ml   Output 615 ml   Net 2075.49 ml         Physical Exam  Vitals and nursing note reviewed.   Constitutional:       Appearance: Normal appearance.   HENT:      Head: Normocephalic and atraumatic.   Eyes:      Extraocular Movements: Extraocular movements intact.      Pupils: Pupils are equal, round, and reactive to light.   Cardiovascular:      Rate and Rhythm: Normal rate and regular rhythm.   Pulmonary:      Effort: Pulmonary effort is normal. No respiratory distress.   Abdominal:      General: There is no distension.   Musculoskeletal:          General: Normal range of motion.      Cervical back: Normal range of motion.   Neurological:      General: No focal deficit present.      Mental Status: She is alert and oriented to person, place, and time.   Psychiatric:         Mood and Affect: Mood normal.         Behavior: Behavior normal.           Significant Labs: All pertinent labs within the past 24 hours have been reviewed.  CBC:   Recent Labs   Lab 05/21/23  0516   HGB 6.8*     CMP:   Recent Labs   Lab 05/20/23  0617 05/21/23  0516    140   K 4.0 3.8    107   CO2 26 24   * 127*   BUN 10 10   CREATININE 1.0 0.9   CALCIUM 8.0* 8.1*   PROT 5.7* 5.6*   ALBUMIN 2.1* 2.1*   BILITOT 0.5 0.4   ALKPHOS 44* 45*   AST 21 24   ALT 10 12   ANIONGAP 9 9       Significant Imaging: I have reviewed all pertinent imaging results/findings within the past 24 hours.

## 2023-05-21 NOTE — PROGRESS NOTES
[facility] Medicine  Telemedicine Progress Note    Patient Name: Nelsy Marino  MRN: [id number]  Patient Class: IP- Inpatient   Admission Date: 5/16/2023  Length of Stay: 5 days  Attending Physician: Rebecca May MD  Primary Care Provider: Juan F Garay          Subjective:     Principal Problem:Hypokalemia        HPI:  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,who presented to the ED at the account of his primary physician after her home health nurse sent routine labs during her follow up,serum K was 2.1, she voiced ongoing diarrhea x 5 days,said to be multiple time daily,she's not walking as she's trying to take pressure of her legs per her surgeon, she voiced associated abdominal pain this morning,said to be generalized with nausea but no vomiting reported,she denied fever,chills or rigor,denied increase drainage from her her left hip surgical drain.She has no urinary symptom of dysuria,frequency,urgency,straining at micturition or hematuria,currently on zosyn with the stop day per spouse tomorrow 5/17/2023.  Labs at presentation showed serun K 2.1,Na 146,BUN/Cr 13/1.1, with H/H 7.1/21.8      Overview/Hospital Course:  Patient admitted to the hospital for hypokalemia- found on routing labs at home with H/H. K replaced. Noted to have a GI bleed. Transfused one unit of blood on 5/17. GI consulted. Hypo K likely from diarrhea. C-diff sent- negative. 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 C-scope showed hemorrhage in ascending colon- treated. H/H remained stable. PT/OT consulted- and recommended SNF.   consulted. PPD placed.  Hgb stable      Interval History: Patient HDS and afebile. No acute events overnight. No new complaints this morning. No more bleeding. Hb 6.8, 1 u PRBC ordered. Pt with peripheral vascular disease s/p femoral-femoral bilateral artery bypass with graft 3/2/23, on chronic antibiotics for suppression. Still has staples in thigh and WILTON drain in place. Will consult vascular surgery for futher recs has she has not been since by vascular surgery in the outpatient setting. pending placement.     Review of Systems   Constitutional:  Positive for activity change and fatigue. Negative for fever.   Respiratory:  Negative for shortness of breath.    Cardiovascular:  Negative for chest pain.   Gastrointestinal:  Negative for abdominal pain.   Neurological:  Negative for dizziness and headaches.   All other systems reviewed and are negative.  Objective:     Vital Signs (Most Recent):  Temp: 98.4 °F (36.9 °C) (05/21/23 1155)  Pulse: 92 (05/21/23 1155)  Resp: 18 (05/21/23 1155)  BP: 135/60 (05/21/23 1155)  SpO2: 95 % (05/21/23 1155) Vital Signs (24h Range):  Temp:  [97.6 °F (36.4 °C)-98.4 °F (36.9 °C)] 98.4 °F (36.9 °C)  Pulse:  [81-94] 92  Resp:  [17-20] 18  SpO2:  [94 %-99 %] 95 %  BP: (105-148)/(54-67) 135/60     Weight: 68.4 kg (150 lb 12.7 oz)  Body mass index is 25.09 kg/m².    Intake/Output Summary (Last 24 hours) at 5/21/2023 1208  Last data filed at 5/21/2023 0830  Gross per 24 hour   Intake 2690.49 ml   Output 615 ml   Net 2075.49 ml         Physical Exam  Vitals and nursing note reviewed.   Constitutional:       Appearance: Normal appearance.   HENT:      Head: Normocephalic and atraumatic.   Eyes:      Extraocular Movements: Extraocular movements intact.      Pupils: Pupils are equal, round, and reactive to light.   Cardiovascular:      Rate and Rhythm: Normal rate and regular rhythm.   Pulmonary:      Effort: Pulmonary effort is normal. No respiratory distress.   Abdominal:       General: There is no distension.   Musculoskeletal:         General: Normal range of motion.      Cervical back: Normal range of motion.   Neurological:      General: No focal deficit present.      Mental Status: She is alert and oriented to person, place, and time.   Psychiatric:         Mood and Affect: Mood normal.         Behavior: Behavior normal.           Significant Labs: All pertinent labs within the past 24 hours have been reviewed.  CBC:   Recent Labs   Lab 05/21/23  0516   HGB 6.8*     CMP:   Recent Labs   Lab 05/20/23  0617 05/21/23  0516    140   K 4.0 3.8    107   CO2 26 24   * 127*   BUN 10 10   CREATININE 1.0 0.9   CALCIUM 8.0* 8.1*   PROT 5.7* 5.6*   ALBUMIN 2.1* 2.1*   BILITOT 0.5 0.4   ALKPHOS 44* 45*   AST 21 24   ALT 10 12   ANIONGAP 9 9       Significant Imaging: I have reviewed all pertinent imaging results/findings within the past 24 hours.      Assessment/Plan:      * Hypokalemia  Patient sent here by PCP for hypokalemia noticed on routine blood work  She has been on chronic antibiotics for suppression therapy following femoral-femoral bilateral artery bypass with graft  Currently on zosyn,but has been having diarrhea for the past 4 days  Hypokalemia likely due to GI loss  Will replete K and work up cause of diarrhea.    Today at 3.3. Will replace further- replace Mag    resolved      Hypomagnesemia  Serum Mg is low likely related to ongoing diarrhea  Will replete serum Mg      Diarrhea of presumed infectious origin  Patient on zosyn possibly for suppression therapy  Post femoral-femoral artery by pass with graft  Voiced mild abdominal pain,wbc within normal limit  No use of laxatives resently  Will r/o C.diff at this time  Long hospital stay,recently discharged    C-diff negative       Vitamin D deficiency  History of Vitamin D def  Lat vitamin 25 -oh was 20 (4/24/23)  Continue vitamin D supplementation,50,000 units weekly of ergocalciferol x 12  weeks      Hypernatremia  Serum Na of 146  Likely related to reduced intake in the setting of ongoing diarrhea  Free water deficit of 1.3 liters, will correct over the next 24 hours with 5% dextrose water  Will monitor BMP 2 12 hours x 2    History of DVT (deep vein thrombosis)  History of DVT  But off Eliquis due to significant GI bleed  Will continue to monitor  Consider dvt prophylaxis at this time,while inpatient and barely moving.      Diastolic CHF, chronic  Patient is identified as having Diastolic (HFpEF) heart failure that is Chronic. CHF is currently controlled. Latest ECHO performed and demonstrates- Results for orders placed during the hospital encounter of 02/27/23    Echo    Interpretation Summary  · The estimated ejection fraction is 65%.  · The left ventricle is normal in size with normal systolic function.  · Grade II left ventricular diastolic dysfunction.  · Normal right ventricular size with normal right ventricular systolic function.  · Mild left atrial enlargement.  · Mild-to-moderate mitral regurgitation.  · There is pulmonary hypertension.  · The estimated PA systolic pressure is 55 mmHg.  · Intermediate central venous pressure (8 mmHg).  . Continue Beta Blocker and Furosemide and monitor clinical status closely. Monitor on telemetry. Patient is off CHF pathway.  Monitor strict Is&Os and daily weights.  Place on fluid restriction of 1.5 L. Continue to stress to patient importance of self efficacy and  on diet for CHF. Last BNP reviewed- and noted below   Recent Labs   Lab 05/16/23  1410   *   .  Not in heart failure exacerbation at this time.no orthopnea,PND or pedal swelling  No elevation in JVD    Type 2 diabetes mellitus with chronic kidney disease, with long-term current use of insulin  Type 2 DM with hyperglycemia  Start basal,bolus and correctional insulin  Patient's FSGs are uncontrolled due to hyperglycemia on current medication regimen.  Last A1c reviewed-   Lab Results    Component Value Date    HGBA1C 8.2 (H) 02/28/2023     Most recent fingerstick glucose reviewed-   Recent Labs   Lab 05/16/23  1255   POCTGLUCOSE 134*     Current correctional scale  Medium  Maintain anti-hyperglycemic dose as follows-   Antihyperglycemics (From admission, onward)    Start     Stop Route Frequency Ordered    05/16/23 2100  insulin detemir U-100 (Levemir) pen 4 Units         -- SubQ Nightly 05/16/23 1604        Hold Oral hypoglycemics while patient is in the hospital.    Essential hypertension  Sub optimal BP control  /65 mmhg  Continue home antihypertensive - amlodipine and carvedilol  Continue lifestyle modification  Low salt diet.    Adjusting meds    PAD (peripheral artery disease)  History of peripheral artery disease s/p femora-femoral bilateral artery bypass with graft and  Right Iliofemoral artery bypass.  With right foot ray amputation for ? osteomyelitis  Continue statin  She could not take eliquis due to previous GI bleed  Will continue antibiotics. Zosyn resumed on 5/21  Vascular consult to review while in patient. Consult placxed on 5/21      VTE Risk Mitigation (From admission, onward)         Ordered     IP VTE HIGH RISK PATIENT  Once         05/16/23 1604     Place sequential compression device  Until discontinued         05/16/23 1604                      I have completed this tele-visit without the assistance of a telepresenter.    The attending portion of this evaluation, treatment, and documentation was performed per Rebecca May MD via Telemedicine AudioVisual using the secure Shared[employer] software platform with 2 way audio/video. The provider was located off-site and the patient is located in the hospital. The aforementioned video software was utilized to document the relevant history and physical exam    Rebecca May MD  Department of Hospital Medicine   [facility] - Med Surg

## 2023-05-21 NOTE — PLAN OF CARE
Problem: Adult Inpatient Plan of Care  Goal: Plan of Care Review  Outcome: Ongoing, Progressing  Flowsheets (Taken 5/21/2023 1557)  Plan of Care Reviewed With:   patient   spouse   family  Goal: Patient-Specific Goal (Individualized)  Outcome: Ongoing, Progressing  Goal: Absence of Hospital-Acquired Illness or Injury  Outcome: Ongoing, Progressing  Intervention: Identify and Manage Fall Risk  Flowsheets (Taken 5/21/2023 1557)  Safety Promotion/Fall Prevention:   medications reviewed   instructed to call staff for mobility   Supervised toileting - stay within arms reach   nonskid shoes/socks when out of bed   assistive device/personal item within reach   Fall Risk reviewed with patient/family   high risk medications identified   side rails raised x 2   room near unit station  Intervention: Prevent Skin Injury  Flowsheets (Taken 5/21/2023 1557)  Body Position:   position changed independently   weight shifting  Skin Protection:   adhesive use limited   tubing/devices free from skin contact  Intervention: Prevent and Manage VTE (Venous Thromboembolism) Risk  Flowsheets (Taken 5/21/2023 1557)  Activity Management:   Ambulated in room - L4   Ankle pumps - L1   Arm raise - L1   Rolling - L1   Straight leg raise - L1   Sitting at edge of bed - L2  Range of Motion: active ROM (range of motion) encouraged  Intervention: Prevent Infection  Flowsheets (Taken 5/21/2023 1557)  Infection Prevention: hand hygiene promoted  Goal: Optimal Comfort and Wellbeing  Outcome: Ongoing, Progressing  Goal: Readiness for Transition of Care  Outcome: Ongoing, Progressing     Problem: Fall Injury Risk  Goal: Absence of Fall and Fall-Related Injury  Outcome: Ongoing, Progressing  Intervention: Identify and Manage Contributors  Flowsheets (Taken 5/21/2023 1557)  Self-Care Promotion:   independence encouraged   meal set-up provided   safe use of adaptive equipment encouraged   BADL personal objects within reach   BADL personal routines  maintained  Medication Review/Management:   medications reviewed   high-risk medications identified  Intervention: Promote Injury-Free Environment  Flowsheets (Taken 5/21/2023 5147)  Safety Promotion/Fall Prevention:   medications reviewed   instructed to call staff for mobility   Supervised toileting - stay within arms reach   nonskid shoes/socks when out of bed   assistive device/personal item within reach   Fall Risk reviewed with patient/family   high risk medications identified   side rails raised x 2   room near unit station     Problem: Skin Injury Risk Increased  Goal: Skin Health and Integrity  Outcome: Ongoing, Progressing  Intervention: Optimize Skin Protection  Flowsheets (Taken 5/21/2023 7591)  Pressure Reduction Techniques:   frequent weight shift encouraged   weight shift assistance provided  Skin Protection:   adhesive use limited   tubing/devices free from skin contact  Head of Bed (HOB) Positioning: HOB at 30-45 degrees     Problem: Diabetes Comorbidity  Goal: Blood Glucose Level Within Targeted Range  Outcome: Ongoing, Progressing     Problem: Adjustment to Illness (Sepsis/Septic Shock)  Goal: Optimal Coping  Outcome: Ongoing, Progressing     Problem: Bleeding (Sepsis/Septic Shock)  Goal: Absence of Bleeding  Outcome: Ongoing, Progressing     Problem: Glycemic Control Impaired (Sepsis/Septic Shock)  Goal: Blood Glucose Level Within Desired Range  Outcome: Ongoing, Progressing     Problem: Infection Progression (Sepsis/Septic Shock)  Goal: Absence of Infection Signs and Symptoms  Outcome: Ongoing, Progressing     Problem: Nutrition Impaired (Sepsis/Septic Shock)  Goal: Optimal Nutrition Intake  Outcome: Ongoing, Progressing     Problem: Fluid and Electrolyte Imbalance (Acute Kidney Injury/Impairment)  Goal: Fluid and Electrolyte Balance  Outcome: Ongoing, Progressing     Problem: Oral Intake Inadequate (Acute Kidney Injury/Impairment)  Goal: Optimal Nutrition Intake  Outcome: Ongoing,  Progressing     Problem: Renal Function Impairment (Acute Kidney Injury/Impairment)  Goal: Effective Renal Function  Outcome: Ongoing, Progressing     Problem: Infection  Goal: Absence of Infection Signs and Symptoms  Outcome: Ongoing, Progressing     Problem: Impaired Wound Healing  Goal: Optimal Wound Healing  Outcome: Ongoing, Progressing

## 2023-05-21 NOTE — CONSULTS
Consult received for SNF.  COURTNEY f/u with patient with sister at the bedside to advise of SNF recommendation.  Patient was received home health prior to admit.  She would like to resume home health with her same provider ([facility]) at discharge.      Patient and sister reported that SNF was tried prior to last discharge but was denied by [employer].  Patient is satisfied with her home health services (PT/OT).

## 2023-05-21 NOTE — CONSULTS
[facility] Hospital Medicine  Telemedicine Consult Note       Pt has been accepted for transfer to [facility] and will be followed through telemedicine services beginning at 7 AM.        Rebecca May MD  [facility] Medicine Staff

## 2023-05-21 NOTE — NURSING
[facility], [facility]  Nurses Note -- 4 Eyes      5/20/2023       Skin assessed on: Q Shift      [x] No Pressure Injuries Present    []Prevention Measures Documented    [] Yes LDA  for Pressure Injury Previously documented     [] Yes New Pressure Injury Discovered   [] LDA for New Pressure Injury Added      Attending RN:  Andreia Maldonado RN     Second RN:  Yue Horn LPN

## 2023-05-21 NOTE — ASSESSMENT & PLAN NOTE
History of DVT  But off Eliquis due to significant GI bleed  Will continue to monitor  Consider dvt prophylaxis at this time,while inpatient and barely moving.    
History of Vitamin D def  Lat vitamin 25 -oh was 20 (4/24/23)  Continue vitamin D supplementation,50,000 units weekly of ergocalciferol x 12 weeks    
History of peripheral artery disease s/p femora-femoral bilateral artery bypass with graft and  Right Iliofemoral artery bypass.  With right foot ray amputation for ? osteomyelitis  Continue statin  She could not take eliquis due to previous GI bleed  Will continue antibiotics  Vascular consult to review while in patient    
History of peripheral artery disease s/p femora-femoral bilateral artery bypass with graft and  Right Iliofemoral artery bypass.  With right foot ray amputation for ? osteomyelitis  Continue statin  She could not take eliquis due to previous GI bleed  Will continue antibiotics. Zosyn resumed on 5/21  Vascular consult to review while in patient. Consult placxed on 5/21  
Patient is identified as having Diastolic (HFpEF) heart failure that is Chronic. CHF is currently controlled. Latest ECHO performed and demonstrates- Results for orders placed during the hospital encounter of 02/27/23    Echo    Interpretation Summary  · The estimated ejection fraction is 65%.  · The left ventricle is normal in size with normal systolic function.  · Grade II left ventricular diastolic dysfunction.  · Normal right ventricular size with normal right ventricular systolic function.  · Mild left atrial enlargement.  · Mild-to-moderate mitral regurgitation.  · There is pulmonary hypertension.  · The estimated PA systolic pressure is 55 mmHg.  · Intermediate central venous pressure (8 mmHg).  . Continue Beta Blocker and Furosemide and monitor clinical status closely. Monitor on telemetry. Patient is off CHF pathway.  Monitor strict Is&Os and daily weights.  Place on fluid restriction of 1.5 L. Continue to stress to patient importance of self efficacy and  on diet for CHF. Last BNP reviewed- and noted below   Recent Labs   Lab 05/16/23  1410   *   .  Not in heart failure exacerbation at this time.no orthopnea,PND or pedal swelling  No elevation in JVD  
Patient on zosyn possibly for suppression therapy  Post femoral-femoral artery by pass with graft  Voiced mild abdominal pain,wbc within normal limit  No use of laxatives resently  Will r/o C.diff at this time  Long hospital stay,recently discharged    
Patient on zosyn possibly for suppression therapy  Post femoral-femoral artery by pass with graft  Voiced mild abdominal pain,wbc within normal limit  No use of laxatives resently  Will r/o C.diff at this time  Long hospital stay,recently discharged    C-diff negative     
Patient on zosyn possibly for suppression therapy  Post femoral-femoral artery by pass with graft  Voiced mild abdominal pain,wbc within normal limit  No use of laxatives resently  Will r/o C.diff at this time  Long hospital stay,recently discharged    C-diff negative     
Patient sent here by PCP for hypokalemia noticed on routine blood work  She has been on chronic antibiotics for suppression therapy following femoral-femoral bilateral artery bypass with graft  Currently on zosyn,but has been having diarrhea for the past 4 days  Hypokalemia likely due to GI loss  Will replete K and work up cause of diarrhea.    
Patient sent here by PCP for hypokalemia noticed on routine blood work  She has been on chronic antibiotics for suppression therapy following femoral-femoral bilateral artery bypass with graft  Currently on zosyn,but has been having diarrhea for the past 4 days  Hypokalemia likely due to GI loss  Will replete K and work up cause of diarrhea.    Today at 3.3. Will replace further    
Patient sent here by PCP for hypokalemia noticed on routine blood work  She has been on chronic antibiotics for suppression therapy following femoral-femoral bilateral artery bypass with graft  Currently on zosyn,but has been having diarrhea for the past 4 days  Hypokalemia likely due to GI loss  Will replete K and work up cause of diarrhea.    Today at 3.3. Will replace further- replace Mag    
Patient sent here by PCP for hypokalemia noticed on routine blood work  She has been on chronic antibiotics for suppression therapy following femoral-femoral bilateral artery bypass with graft  Currently on zosyn,but has been having diarrhea for the past 4 days  Hypokalemia likely due to GI loss  Will replete K and work up cause of diarrhea.    Today at 3.3. Will replace further- replace Mag    
Patient sent here by PCP for hypokalemia noticed on routine blood work  She has been on chronic antibiotics for suppression therapy following femoral-femoral bilateral artery bypass with graft  Currently on zosyn,but has been having diarrhea for the past 4 days  Hypokalemia likely due to GI loss  Will replete K and work up cause of diarrhea.    Today at 3.3. Will replace further- replace Mag    resolved    
Serum Mg is low likely related to ongoing diarrhea  Will replete serum Mg    
Serum Na of 146  Likely related to reduced intake in the setting of ongoing diarrhea  Free water deficit of 1.3 liters, will correct over the next 24 hours with 5% dextrose water  Will monitor BMP 2 12 hours x 2  
Sub optimal BP control  /65 mmhg  Continue home antihypertensive - amlodipine and carvedilol  Continue lifestyle modification  Low salt diet.    
Sub optimal BP control  /65 mmhg  Continue home antihypertensive - amlodipine and carvedilol  Continue lifestyle modification  Low salt diet.    Adjusting meds  
Type 2 DM with hyperglycemia  Start basal,bolus and correctional insulin  Patient's FSGs are uncontrolled due to hyperglycemia on current medication regimen.  Last A1c reviewed-   Lab Results   Component Value Date    HGBA1C 8.2 (H) 02/28/2023     Most recent fingerstick glucose reviewed-   Recent Labs   Lab 05/16/23  1255   POCTGLUCOSE 134*     Current correctional scale  Medium  Maintain anti-hyperglycemic dose as follows-   Antihyperglycemics (From admission, onward)    Start     Stop Route Frequency Ordered    05/16/23 2100  insulin detemir U-100 (Levemir) pen 4 Units         -- SubQ Nightly 05/16/23 1604        Hold Oral hypoglycemics while patient is in the hospital.  
Color consistent with ethnicity/race, warm, dry intact, resilient.

## 2023-05-22 VITALS
BODY MASS INDEX: 25.13 KG/M2 | RESPIRATION RATE: 20 BRPM | WEIGHT: 150.81 LBS | DIASTOLIC BLOOD PRESSURE: 69 MMHG | OXYGEN SATURATION: 95 % | TEMPERATURE: 98 F | HEART RATE: 87 BPM | SYSTOLIC BLOOD PRESSURE: 156 MMHG | HEIGHT: 65 IN

## 2023-05-22 PROBLEM — Z95.828 S/P FEMORAL-FEMORAL BYPASS SURGERY: Status: ACTIVE | Noted: 2023-05-22

## 2023-05-22 PROBLEM — Z48.03 CHANGE OR REMOVAL OF DRAINS: Status: ACTIVE | Noted: 2023-05-22

## 2023-05-22 PROBLEM — Z48.02 REMOVAL OF STAPLE: Status: ACTIVE | Noted: 2023-05-22

## 2023-05-22 LAB
ABO + RH BLD: ABNORMAL
ALBUMIN SERPL BCP-MCNC: 2.2 G/DL (ref 3.5–5.2)
ALP SERPL-CCNC: 58 U/L (ref 55–135)
ALT SERPL W/O P-5'-P-CCNC: 12 U/L (ref 10–44)
ANION GAP SERPL CALC-SCNC: 11 MMOL/L (ref 8–16)
ANION GAP SERPL CALC-SCNC: 11 MMOL/L (ref 8–16)
AST SERPL-CCNC: 25 U/L (ref 10–40)
BASOPHILS # BLD AUTO: 0.01 K/UL (ref 0–0.2)
BASOPHILS NFR BLD: 0.1 % (ref 0–1.9)
BILIRUB SERPL-MCNC: 0.5 MG/DL (ref 0.1–1)
BLD GP AB SCN CELLS X3 SERPL QL: ABNORMAL
BUN SERPL-MCNC: 8 MG/DL (ref 8–23)
BUN SERPL-MCNC: 8 MG/DL (ref 8–23)
CALCIUM SERPL-MCNC: 8.5 MG/DL (ref 8.7–10.5)
CALCIUM SERPL-MCNC: 8.6 MG/DL (ref 8.7–10.5)
CHLORIDE SERPL-SCNC: 106 MMOL/L (ref 95–110)
CHLORIDE SERPL-SCNC: 107 MMOL/L (ref 95–110)
CO2 SERPL-SCNC: 23 MMOL/L (ref 23–29)
CO2 SERPL-SCNC: 24 MMOL/L (ref 23–29)
CREAT SERPL-MCNC: 0.9 MG/DL (ref 0.5–1.4)
CREAT SERPL-MCNC: 0.9 MG/DL (ref 0.5–1.4)
DIFFERENTIAL METHOD: ABNORMAL
EOSINOPHIL # BLD AUTO: 0.4 K/UL (ref 0–0.5)
EOSINOPHIL NFR BLD: 5.2 % (ref 0–8)
ERYTHROCYTE [DISTWIDTH] IN BLOOD BY AUTOMATED COUNT: 15.5 % (ref 11.5–14.5)
EST. GFR  (NO RACE VARIABLE): >60 ML/MIN/1.73 M^2
EST. GFR  (NO RACE VARIABLE): >60 ML/MIN/1.73 M^2
GLUCOSE SERPL-MCNC: 133 MG/DL (ref 70–110)
GLUCOSE SERPL-MCNC: 136 MG/DL (ref 70–110)
HCT VFR BLD AUTO: 30.7 % (ref 37–48.5)
HGB BLD-MCNC: 9.6 G/DL (ref 12–16)
IMM GRANULOCYTES # BLD AUTO: 0.03 K/UL (ref 0–0.04)
IMM GRANULOCYTES NFR BLD AUTO: 0.4 % (ref 0–0.5)
LYMPHOCYTES # BLD AUTO: 1.6 K/UL (ref 1–4.8)
LYMPHOCYTES NFR BLD: 19.4 % (ref 18–48)
MAGNESIUM SERPL-MCNC: 1.9 MG/DL (ref 1.6–2.6)
MCH RBC QN AUTO: 26.8 PG (ref 27–31)
MCHC RBC AUTO-ENTMCNC: 31.3 G/DL (ref 32–36)
MCV RBC AUTO: 86 FL (ref 82–98)
MONOCYTES # BLD AUTO: 0.5 K/UL (ref 0.3–1)
MONOCYTES NFR BLD: 6.1 % (ref 4–15)
NEUTROPHILS # BLD AUTO: 5.5 K/UL (ref 1.8–7.7)
NEUTROPHILS NFR BLD: 68.8 % (ref 38–73)
NRBC BLD-RTO: 0 /100 WBC
PHOSPHATE SERPL-MCNC: 3.3 MG/DL (ref 2.7–4.5)
PLATELET # BLD AUTO: 257 K/UL (ref 150–450)
PMV BLD AUTO: 10.7 FL (ref 9.2–12.9)
POTASSIUM SERPL-SCNC: 4 MMOL/L (ref 3.5–5.1)
POTASSIUM SERPL-SCNC: 4.1 MMOL/L (ref 3.5–5.1)
PROT SERPL-MCNC: 6.3 G/DL (ref 6–8.4)
RBC # BLD AUTO: 3.58 M/UL (ref 4–5.4)
SODIUM SERPL-SCNC: 141 MMOL/L (ref 136–145)
SODIUM SERPL-SCNC: 141 MMOL/L (ref 136–145)
SPECIMEN OUTDATE: ABNORMAL
TB INDURATION 48 - 72 HR READ: 0 MM (ref ?–0)
WBC # BLD AUTO: 8.03 K/UL (ref 3.9–12.7)

## 2023-05-22 PROCEDURE — 84100 ASSAY OF PHOSPHORUS: CPT | Performed by: HOSPITALIST

## 2023-05-22 PROCEDURE — 25000003 PHARM REV CODE 250: Performed by: HOSPITALIST

## 2023-05-22 PROCEDURE — 80048 BASIC METABOLIC PNL TOTAL CA: CPT | Mod: XB | Performed by: HOSPITALIST

## 2023-05-22 PROCEDURE — 85025 COMPLETE CBC W/AUTO DIFF WBC: CPT | Performed by: HOSPITALIST

## 2023-05-22 PROCEDURE — 25000003 PHARM REV CODE 250: Performed by: STUDENT IN AN ORGANIZED HEALTH CARE EDUCATION/TRAINING PROGRAM

## 2023-05-22 PROCEDURE — 25000003 PHARM REV CODE 250: Performed by: INTERNAL MEDICINE

## 2023-05-22 PROCEDURE — 99223 1ST HOSP IP/OBS HIGH 75: CPT | Mod: 24,,, | Performed by: NURSE PRACTITIONER

## 2023-05-22 PROCEDURE — 80053 COMPREHEN METABOLIC PANEL: CPT | Performed by: STUDENT IN AN ORGANIZED HEALTH CARE EDUCATION/TRAINING PROGRAM

## 2023-05-22 PROCEDURE — 99223 PR INITIAL HOSPITAL CARE,LEVL III: ICD-10-PCS | Mod: 24,,, | Performed by: NURSE PRACTITIONER

## 2023-05-22 PROCEDURE — 83735 ASSAY OF MAGNESIUM: CPT | Performed by: STUDENT IN AN ORGANIZED HEALTH CARE EDUCATION/TRAINING PROGRAM

## 2023-05-22 PROCEDURE — 36415 COLL VENOUS BLD VENIPUNCTURE: CPT | Performed by: HOSPITALIST

## 2023-05-22 RX ORDER — ADHESIVE BANDAGE
30 BANDAGE TOPICAL DAILY
Status: DISCONTINUED | OUTPATIENT
Start: 2023-05-22 | End: 2023-05-22 | Stop reason: HOSPADM

## 2023-05-22 RX ORDER — HYDRALAZINE HYDROCHLORIDE 25 MG/1
75 TABLET, FILM COATED ORAL EVERY 8 HOURS
Qty: 270 TABLET | Refills: 11 | Status: ON HOLD | OUTPATIENT
Start: 2023-05-22 | End: 2023-07-18 | Stop reason: HOSPADM

## 2023-05-22 RX ADMIN — HYDRALAZINE HYDROCHLORIDE 75 MG: 25 TABLET, FILM COATED ORAL at 01:05

## 2023-05-22 RX ADMIN — DULOXETINE 30 MG: 30 CAPSULE, DELAYED RELEASE ORAL at 08:05

## 2023-05-22 RX ADMIN — CARVEDILOL 25 MG: 12.5 TABLET, FILM COATED ORAL at 08:05

## 2023-05-22 RX ADMIN — PANTOPRAZOLE SODIUM 40 MG: 40 TABLET, DELAYED RELEASE ORAL at 08:05

## 2023-05-22 RX ADMIN — Medication 1 ENEMA: at 11:05

## 2023-05-22 RX ADMIN — AMLODIPINE BESYLATE 10 MG: 5 TABLET ORAL at 08:05

## 2023-05-22 RX ADMIN — HYDRALAZINE HYDROCHLORIDE 75 MG: 25 TABLET, FILM COATED ORAL at 06:05

## 2023-05-22 NOTE — PLAN OF CARE
05/22/23 0952   Medicare Message   Important Message from Medicare regarding Discharge Appeal Rights Given to patient/caregiver;Other (comments);Explained to patient/caregiver;Signed/date by patient/caregiver   Date IMM was signed 05/22/23   Time IMM was signed 0952     Patient expressed understanding.

## 2023-05-22 NOTE — PLAN OF CARE
Follow-up Information       Brandon Smiley MD Follow up on 5/24/2023.    Specialty: Plastic Surgery  Why: Wednesday, May 24, 2023 [facility] in the [facility] on [address] at 11:15 am with Dr. Smiley  Contact information:  [address]  [facility] [address]  [phone]               [facility] Follow up on 5/23/2023.    Why: [facility] [phone]  2:00 PM  Contact information:  [address] [facility] [address]  [phone]               [facility] Follow up.    Specialties: Home Health Services, Physical Therapy, Occupational Therapy  Contact information:  [address]  [facility] [id number] [phone]

## 2023-05-22 NOTE — PLAN OF CARE
[place] [facility] Surg    HOME HEALTH ORDERS  FACE TO FACE ENCOUNTER    Patient Name: Nelsy Marino  YOB: 1940    PCP: Juan F Garay   PCP Address: [place]ER  PCP Phone Number: [phone]  PCP Fax: [phone]    Encounter Date: 05/22/2023    Admit to Home Health    Diagnoses:  Active Hospital Problems    Diagnosis  POA    *Hypokalemia [E87.6]  Yes    Hypernatremia [E87.0]  Yes    Vitamin D deficiency [E55.9]  Yes    Diarrhea of presumed infectious origin [R19.7]  Yes    Hypomagnesemia [E83.42]  Yes    PAD (peripheral artery disease) [I73.9]  Yes     Chronic     Patient did not tolerate aspirin or plavix due to GI bleeding        Essential hypertension [I10]  Yes     Chronic    Type 2 diabetes mellitus with chronic kidney disease, with long-term current use of insulin [E11.22, Z79.4]  Not Applicable     Chronic    Diastolic CHF, chronic [I50.32]  Yes     Chronic    History of DVT (deep vein thrombosis) [Z86.718]  Not Applicable     Chronic      Resolved Hospital Problems   No resolved problems to display.       Future Appointments   Date Time Provider Department Center   5/24/2023 11:15 AM Brandon Smiley MD [facility] PLASTIC [address]   5/30/2023  2:45 PM Danny Dunbar MD [facility] VAS J[facility]EL [facility]   5/31/2023  8:00 AM Rivas Rojas NP [address]   6/8/2023  1:30 PM Shani Andrade MD [facility] [facility]   6/13/2023  1:30 PM VASCULAR ULTRASOUND, [facility] EKG [facility]   6/13/2023  2:30 PM VASCULAR ULTRASOUND, [facility] EKG [facility]   6/13/2023  3:15 PM Danny Dunbar MD [facility] VAS JALEEL [facility]   7/18/2023 11:00 AM Gaby Walker MD [facility] GASTRO [facility]      Follow-up Information       Brandon Smiley MD Follow up on 5/24/2023.    Specialty: Plastic Surgery  Why: Wednesday, May 24, 2023 [facility] in the [facility] on [address] at 11:15 am with Dr. Smiley  Contact information:  [address]  [facility] [address]  [phone]               [facility] Follow up on 5/23/2023.    Why: sanjeev [facility] - [phone]  2:00 PM  Contact information:  [place] [address]  [phone]               [facility] Follow up.    Specialties: Home Health Services, Physical Therapy, Occupational Therapy  Contact information:  [address]  [address]  [facility] [address]  [phone]                               I have seen and examined this patient face to face today. My clinical findings that support the need for the home health skilled services and home bound status are the following:  Weakness/numbness causing balance and gait disturbance due to Infection making it taxing to leave home.    Allergies:  Review of patient's allergies indicates:   Allergen Reactions    Motrin [ibuprofen] Itching       Diet: cardiac diet and diabetic diet: 2000 calorie    Activities: activity as tolerated    Nursing:   SN to complete comprehensive assessment including routine vital signs. Instruct on disease process and s/s of complications to report to MD. Review/verify medication list sent home with the patient at time of discharge  and instruct patient/caregiver as needed. Frequency may be adjusted depending on start of care date.    Notify MD if SBP > 160 or < 90; DBP > 90 or < 50; HR > 120 or < 50; Temp > 101;       CONSULTS:    Physical Therapy to evaluate and treat. Evaluate for home safety and equipment needs; Establish/upgrade home exercise program. Perform / instruct on therapeutic exercises, gait training, transfer training, and Range of Motion.  Occupational Therapy to evaluate and treat. Evaluate home environment for safety and equipment needs. Perform/Instruct on transfers, ADL training, ROM, and therapeutic exercises.  Aide to provide assistance with personal care, ADLs, and vital signs.    MISCELLANEOUS CARE:  Routine Skin for Bedridden Patients: Instruct patient/caregiver  to apply moisture barrier cream to all skin folds and wet areas in perineal area daily and after baths and all bowel movements.    WOUND CARE ORDERS  Local wound care to left groin and left anterior thigh wounds every Monday and Thursday- Cleanse with Vashe. Apply Aquacel Ag to wounds and cover with Mepilex 4x4    Local wound care to left heel every Monday and Thursday- Cleanse with Vashe. Apply Mepilex 6x6 dressing.    Local wound care to right TMA every Monday and Thursday- Paint with betadine. Cover with dry gauze and secure with Kerlix roll. Wrap with ACE wrap.    Medications: Review discharge medications with patient and family and provide education.      Current Discharge Medication List        START taking these medications    Details   hydrALAZINE (APRESOLINE) 25 MG tablet Take 3 tablets (75 mg total) by mouth every 8 (eight) hours.  Qty: 270 tablet, Refills: 11    Comments: .           CONTINUE these medications which have NOT CHANGED    Details   acetaminophen (TYLENOL) 500 MG tablet Take 2 tablets (1,000 mg total) by mouth every 8 (eight) hours.  Refills: 0      amLODIPine (NORVASC) 10 MG tablet Take 1 tablet (10 mg total) by mouth once daily.  Qty: 90 tablet, Refills: 0    Comments: .      atorvastatin (LIPITOR) 40 MG tablet Take 1 tablet (40 mg total) by mouth every evening.  Qty: 30 tablet, Refills: 0      BLOOD SUGAR DIAGNOSTIC (TRUE METRIX GLUCOSE TEST STRIP MISC) by Misc.(Non-Drug; Combo Route) route.      carvediloL (COREG) 25 MG tablet Take 1 tablet (25 mg total) by mouth 2 (two) times daily.  Qty: 60 tablet, Refills: 11    Comments: .      diphenhydrAMINE (BENADRYL) 25 mg capsule Take 25 mg by mouth daily as needed for Itching.      DULoxetine (CYMBALTA) 30 MG capsule Take 1 capsule (30 mg total) by mouth 2 (two) times daily.  Qty: 60 capsule, Refills: 0      insulin detemir U-100, Levemir, 100 unit/mL (3 mL) SubQ InPn pen Inject 5 Units into the skin every evening.  Qty: 6 mL, Refills: 3     "  insulin syringe-needle,dispos. 0.3 mL 30 gauge x 5/16" Syrg by Misc.(Non-Drug; Combo Route) route.      oxyCODONE (ROXICODONE) 5 MG immediate release tablet Take 1 tablet (5 mg total) by mouth every 12 (twelve) hours as needed.  Qty: 14 tablet, Refills: 0    Comments: Quantity prescribed more than 7 day supply? No      pantoprazole (PROTONIX) 40 MG tablet Take 1 tablet (40 mg total) by mouth once daily.  Qty: 90 tablet, Refills: 0      pen needle, diabetic 32 gauge x 5/32" Ndle Inject 1 each into the skin 4 (four) times daily.  Qty: 100 each, Refills: 0      vitamin D (VITAMIN D3) 1000 units Tab Take 1 tablet (1,000 Units total) by mouth once daily.           STOP taking these medications       dextrose 5 % in water (D5W) 5 % PgBk 100 mL with piperacillin-tazobactam 4.5 gram SolR 4.5 g Comments:   Reason for Stopping:         sodium chloride 0.9 % PgBk 100 mL with micafungin 100 mg SolR 100 mg Comments:   Reason for Stopping:               I certify that this patient is confined to her home and needs intermittent skilled nursing care, physical therapy and occupational therapy.    Rebecca May MD  5/22/2023 10:43 AM  Department of Hospital medicine         "

## 2023-05-22 NOTE — NURSING
[facility]  Nurses Note -- 4 Eyes      5/21/2023       Skin assessed on: Q Shift      [] No Pressure Injuries Present    []Prevention Measures Documented    [x] Yes LDA  for Pressure Injury Previously documented     [] Yes New Pressure Injury Discovered   [] LDA for New Pressure Injury Added      Attending RN:  Yue Hron LPN     Second RN:  SUSIE Boswell

## 2023-05-22 NOTE — CONSULTS
"Vascular Surgery  Consult Note    Inpatient consult to Vascular Surgery  Consult performed by: Coy Ray NP  Consult ordered by: Rebecca May MD  Reason for consult: staples and WILTON drain removal  Assessment/Recommendations: Plan for staple removal, will defer to Plastic surgery regarding removal of WILTON drain both placed by [facility].      Subjective:     Chief Complaint/Reason for Admission: Staple and WILTON drain removal    History of Present Illness: Ned 82 y female currently admitted for hypokalemia. S/p  fem-fem bypass 3/2023 with Dr. Dunbar at [facility] complicated by wound dehiscence. 4/2023 wound debriedement, muscle flap and WILTON drain placement to left groin by Plastic surgery (Dr. Smiley). Vascular surgery consulted for staple and WILTON drain removal.     Medications Prior to Admission   Medication Sig Dispense Refill Last Dose    acetaminophen (TYLENOL) 500 MG tablet Take 2 tablets (1,000 mg total) by mouth every 8 (eight) hours.  0     amLODIPine (NORVASC) 10 MG tablet Take 1 tablet (10 mg total) by mouth once daily. 90 tablet 0     atorvastatin (LIPITOR) 40 MG tablet Take 1 tablet (40 mg total) by mouth every evening. 30 tablet 0     BLOOD SUGAR DIAGNOSTIC (TRUE METRIX GLUCOSE TEST STRIP MISC) by Misc.(Non-Drug; Combo Route) route.       carvediloL (COREG) 25 MG tablet Take 1 tablet (25 mg total) by mouth 2 (two) times daily. 60 tablet 11     diphenhydrAMINE (BENADRYL) 25 mg capsule Take 25 mg by mouth daily as needed for Itching.       DULoxetine (CYMBALTA) 30 MG capsule Take 1 capsule (30 mg total) by mouth 2 (two) times daily. 60 capsule 0     insulin detemir U-100, Levemir, 100 unit/mL (3 mL) SubQ InPn pen Inject 5 Units into the skin every evening. 6 mL 3     insulin syringe-needle,dispos. 0.3 mL 30 gauge x 5/16" Syrg by Misc.(Non-Drug; Combo Route) route.       oxyCODONE (ROXICODONE) 5 MG immediate release tablet Take 1 tablet (5 mg total) by mouth every 12 (twelve) hours as " "needed. 14 tablet 0     pantoprazole (PROTONIX) 40 MG tablet Take 1 tablet (40 mg total) by mouth once daily. 90 tablet 0     pen needle, diabetic 32 gauge x 5/32" Ndle Inject 1 each into the skin 4 (four) times daily. 100 each 0     vitamin D (VITAMIN D3) 1000 units Tab Take 1 tablet (1,000 Units total) by mouth once daily.       [DISCONTINUED] dextrose 5 % in water (D5W) 5 % PgBk 100 mL with piperacillin-tazobactam 4.5 gram SolR 4.5 g Inject 4.5 g into the vein every 8 (eight) hours.       [DISCONTINUED] sodium chloride 0.9 % PgBk 100 mL with micafungin 100 mg SolR 100 mg Inject 100 mg into the vein once daily. for 14 days          Review of patient's allergies indicates:   Allergen Reactions    Motrin [ibuprofen] Itching       Past Medical History:   Diagnosis Date    CHF (congestive heart failure)     Diabetes mellitus     DVT (deep venous thrombosis)     Hypertension     Miscarriage      Past Surgical History:   Procedure Laterality Date     femoral vascular surgery       ANGIOGRAPHY OF LOWER EXTREMITY Right 2/23/2023    Procedure: Angiogram Extremity Unilateral;  Surgeon: Danny Dunbar MD;  Location: [facility] OR;  Service: Vascular;  Laterality: Right;  RN PHONE PREOP 2/20/2023----CK CONSENT    ANGIOGRAPHY OF LOWER EXTREMITY Right 4/4/2023    Procedure: Angiogram Extremity Unilateral - possible endovascular intervention;  Surgeon: René Simms MD;  Location: [address];  Service: Vascular;  Laterality: Right;  19.6 mins.  740.81 mGy  73.3399 Gy.cm  58ml dye  local - 1ml    COLONOSCOPY N/A 5/18/2023    Procedure: COLONOSCOPY;  Surgeon: Cuong Aguiar MD;  Location: [facility] ENDO;  Service: Endoscopy;  Laterality: N/A;    CREATION OF FEMORAL-FEMORAL ARTERY BYPASS WITH GRAFT Bilateral 3/2/2023    Procedure: CREATION, BYPASS, ARTERIAL, FEMORAL TO FEMORAL, USING GRAFT, LEFT FEMORAL ENDARTERECTOMY, LEFT ILIAC ARTERY STENT PLACEMENT, RIGHT LOWER EXTREMITY ANGIOGRAM AND RIGHT SFA  ANGIOPLASTY;  Surgeon: " Danny Dunbar MD;  Location: [facility] OR 2ND FLR;  Service: Vascular;  Laterality: Bilateral;   744.47 MGY  175.36 Gycm  Flouro time 20.5 mins      CREATION OF ILIOFEMORAL ARTERY BYPASS Right 3/20/2019    Procedure: CREATION, BYPASS, ARTERIAL, ILIAC TO FEMORAL, RIGHT LOWER EXTREMITY, RIGHT PROFUNDAPLASTY;  Surgeon: Danny Dunbar MD;  Location: [facility] OR;  Service: Vascular;  Laterality: Right;  11:00AM START PER NAZANIN RICHARDS PREOP 3/13/2019  ACT'S, CELL SAVER, GRAFTS, BOOK WATER---NEED H/P  CONSENT IN AM------HGBA1C    CREATION OF MUSCLE ROTATIONAL FLAP Left 3/31/2023    Procedure: CREATION, FLAP, MUSCLE ROTATION;  Surgeon: Brandon Smiley MD;  Location: [facility] OR 2ND FLR;  Service: Plastics;  Laterality: Left;    ENDOSCOPY OF PROXIMAL SMALL INTESTINE N/A 5/18/2023    Procedure: ENTEROSCOPY, PROXIMAL;  Surgeon: Cuong Aguiar MD;  Location: [facility];  Service: Endoscopy;  Laterality: N/A;    ESOPHAGOGASTRODUODENOSCOPY N/A 3/17/2023    Procedure: EGD (ESOPHAGOGASTRODUODENOSCOPY);  Surgeon: Gee Rodriguez MD;  [facility]  Service: Endoscopy;  Laterality: N/A;    ESOPHAGOGASTRODUODENOSCOPY N/A 4/11/2023    Procedure: EGD (ESOPHAGOGASTRODUODENOSCOPY);  Surgeon: Nazanin Kaiser MD;  [facility]  Service: Endoscopy;  Laterality: N/A;    ESOPHAGOGASTRODUODENOSCOPY N/A 4/26/2023    Procedure: EGD (ESOPHAGOGASTRODUODENOSCOPY);  Surgeon: Gee Rodriguez MD;  Location: [facility] ENDO ([facility] FLR);  Service: Endoscopy;  Laterality: N/A;    ESOPHAGOGASTRODUODENOSCOPY N/A 5/2/2023    Procedure: EGD (ESOPHAGOGASTRODUODENOSCOPY);  Surgeon: Cuong Conteh MD;  Location: [facility] ENDO ([facility] FLR);  Service: Endoscopy;  Laterality: N/A;    FOOT AMPUTATION THROUGH METATARSAL Right 3/4/2023    Procedure: AMPUTATION, FOOT, TRANSMETATARSAL;  Surgeon: Benjamin Godfrey DPM;  Location: [facility] OR [address] [place] [address];  Service: Podiatry;  Laterality: Right;    HYSTERECTOMY      ?unsure cervix present     INCISION AND DRAINAGE Right 2019    Procedure: Incision and Drainage - R groin washout, possible muscle flap;  Surgeon: Danny Dunbar MD;  Location: [facility] OR 2ND FLR;  Service: Vascular;  Laterality: Right;  groin washout    INCISION AND DRAINAGE OF GROIN Right 5/3/2019    Procedure: Incision and Drainage R groin;  Surgeon: Danny Dunbar MD;  Location: [facility] OR 2ND FLR;  Service: Vascular;  Laterality: Right;    WOUND DEBRIDEMENT Left 3/31/2023    Procedure: DEBRIDEMENT, WOUND;  Surgeon: René Simms MD;  Location: [facility] OR 2ND FLR;  Service: Vascular;  Laterality: Left;     Family History       Problem Relation (Age of Onset)    Diabetes Father    Hypertension Father          Tobacco Use    Smoking status: Former     Types: Cigarettes     Quit date:      Years since quittin.4    Smokeless tobacco: Never   Substance and Sexual Activity    Alcohol use: No    Drug use: No    Sexual activity: Never     Review of Systems  Constitutional:  Positive for activity change and fatigue. Negative for fever.   Respiratory:  Negative for shortness of breath.    Cardiovascular:  Negative for chest pain.   Gastrointestinal:  Negative for abdominal pain.   Neurological:  Negative for dizziness and headaches.   All other systems reviewed and are negative  Objective:     Vital Signs (Most Recent):  Temp: 97.8 °F (36.6 °C) (23 1124)  Pulse: 87 (23 1124)  Resp: 20 (23 1124)  BP: (!) 156/69 (23 1124)  SpO2: 95 % (23 1124) Vital Signs (24h Range):  Temp:  [97.5 °F (36.4 °C)-98.6 °F (37 °C)] 97.8 °F (36.6 °C)  Pulse:  [80-90] 87  Resp:  [18-20] 20  SpO2:  [95 %-100 %] 95 %  BP: (119-156)/(56-71) 156/69     Weight: 68.4 kg (150 lb 12.7 oz)  Body mass index is 25.09 kg/m².    Date 23 0700 - 23 0659   Shift 5979-6456 1022-3098 0167-1729 24 Hour Total   INTAKE   P.O. 120   120   Shift Total(mL/kg) 120(1.8)   120(1.8)   OUTPUT   Shift Total(mL/kg)       Weight (kg) 68.4  68.4 68.4 68.4       Physical Exam  Constitutional:       Appearance: Normal appearance.   HENT:      Head: Normocephalic and atraumatic.   Eyes:      Extraocular Movements: Extraocular movements intact.      Pupils: Pupils are equal, round, and reactive to light.   Cardiovascular:      Rate and Rhythm: Normal rate and regular rhythm.   Pulmonary:      Effort: Pulmonary effort is normal. No respiratory distress.   Abdominal:      General: There is no distension.   Musculoskeletal:         General: Normal range of motion.      Cervical back: Normal range of motion.      Comments: Right groin staples intact incision well healed, plan for staple removal. Left groin incision healing well WILTON drain in place 10cc serous drainage.   Neurological:      General: No focal deficit present.      Mental Status: She is alert and oriented to person, place, and time.   Psychiatric:         Mood and Affect: Mood normal.         Behavior: Behavior normal.      Significant Labs:  BMP:   Recent Labs   Lab 05/22/23  0346   *      K 4.1      CO2 24   BUN 8   CREATININE 0.9   CALCIUM 8.5*   MG 1.9     CBC:   Recent Labs   Lab 05/22/23  0345   WBC 8.03   RBC 3.58*   HGB 9.6*   HCT 30.7*      MCV 86   MCH 26.8*   MCHC 31.3*       Significant Diagnostics:  I have reviewed all pertinent imaging results/findings within the past 24 hours.    Assessment/Plan:     Active Diagnoses:    Diagnosis Date Noted POA    PRINCIPAL PROBLEM:  Hypokalemia [E87.6] 05/16/2023 Yes    Removal of staple [Z48.02] 05/22/2023 Not Applicable    S/P femoral-femoral bypass surgery [Z95.828] 05/22/2023 Not Applicable    Change or removal of drains [Z48.03] 05/22/2023 Not Applicable    Hypernatremia [E87.0] 05/16/2023 Yes    Vitamin D deficiency [E55.9] 05/16/2023 Yes    Diarrhea of presumed infectious origin [R19.7] 05/16/2023 Yes    Hypomagnesemia [E83.42] 05/16/2023 Yes    PAD (peripheral artery disease) [I73.9] 03/20/2019 Yes     Chronic     Essential hypertension [I10] 03/20/2019 Yes     Chronic    Type 2 diabetes mellitus with chronic kidney disease, with long-term current use of insulin [E11.22, Z79.4] 03/20/2019 Not Applicable     Chronic    Diastolic CHF, chronic [I50.32] 03/20/2019 Yes     Chronic    History of DVT (deep vein thrombosis) [Z86.718] 03/20/2019 Not Applicable     Chronic      Problems Resolved During this Admission:   #Staple and WILTON drain removal  - Pt s/p fem-fem bypass 3/2023 with Dr. Dunbar at [facility] complicated by wound dehiscence. 4/2023 wound debriedement, muscle flap and WILTON drain placement to left groin by Plastic surgery (Dr. Smiley).  Pt currently admitted for Hypokalemia at [facility], vascular consulted for right groin staple removal and WILTON drain.     - Right groin staples removed at bedside by nurse. Will defer left groin WILTON drain removal to Plastic surgery awaiting further recs. Pt scheduled for Plastic surgery follow up appt on 5/24/23.  - Follow up in Vascular clinic 6/13/23.     Thank you for your consult. I will follow-up with patient. Please contact us if you have any additional questions.    Coy Ray NP  Vascular Surgery

## 2023-05-22 NOTE — NURSING
Report received and care assumed. Discussed plan of care and safety with patient . Reviewed call system. No acute distress noted .Denies pain or discomfort

## 2023-05-22 NOTE — PLAN OF CARE
TN sent a secure chat to med surg nurseMarian, this patient is cleared for discharge from case management's viewpoint. Family Home health, plastics appt, pcp with Juan F  and MAINOR.        05/22/23 1124   Final Note   Assessment Type Final Discharge Note   Anticipated Discharge Disposition Home-Health   What phone number can be called within the next 1-3 days to see how you are doing after discharge?   (see chart)   Hospital Resources/Appts/Education Provided Provided patient/caregiver with written discharge plan information;Appointments scheduled and added to AVS;Appointments scheduled by Navigator/Coordinator   Post-Acute Status   Post-Acute Authorization Home Health   Coverage [employer] Managed medicare HMO   Patient choice form signed by patient/caregiver List with quality metrics by geographic area provided   Discharge Delays None known at this time     Caridad, this patient is cleared for discharge from case management's viewpoint.

## 2023-05-22 NOTE — NURSING
Reviewed discharge instructions with patient and her sister. Instructions includes follow up appointments,EMS and when to seeks additional medical help. No acute distress noted . Home health notified per [facility]. PPD site clear.Dressing to wound all clean and dry

## 2023-05-24 ENCOUNTER — OFFICE VISIT (OUTPATIENT)
Dept: PLASTIC SURGERY | Facility: CLINIC | Age: 83
End: 2023-05-24
Payer: MEDICARE

## 2023-05-24 ENCOUNTER — PATIENT MESSAGE (OUTPATIENT)
Dept: ADMINISTRATIVE | Facility: CLINIC | Age: 83
End: 2023-05-24
Payer: MEDICARE

## 2023-05-24 ENCOUNTER — PATIENT OUTREACH (OUTPATIENT)
Dept: ADMINISTRATIVE | Facility: CLINIC | Age: 83
End: 2023-05-24
Payer: MEDICARE

## 2023-05-24 VITALS
WEIGHT: 150 LBS | HEIGHT: 65 IN | HEART RATE: 78 BPM | SYSTOLIC BLOOD PRESSURE: 159 MMHG | BODY MASS INDEX: 24.99 KG/M2 | OXYGEN SATURATION: 96 % | DIASTOLIC BLOOD PRESSURE: 68 MMHG

## 2023-05-24 DIAGNOSIS — Z09 SURGERY FOLLOW-UP EXAMINATION: Primary | ICD-10-CM

## 2023-05-24 DIAGNOSIS — T82.7XXD VASCULAR GRAFT INFECTION, SUBSEQUENT ENCOUNTER: ICD-10-CM

## 2023-05-24 PROCEDURE — 99999 PR PBB SHADOW E&M-EST. PATIENT-LVL IV: CPT | Mod: PBBFAC,,, | Performed by: SURGERY

## 2023-05-24 PROCEDURE — 1101F PR PT FALLS ASSESS DOC 0-1 FALLS W/OUT INJ PAST YR: ICD-10-PCS | Mod: CPTII,S$GLB,, | Performed by: SURGERY

## 2023-05-24 PROCEDURE — 1160F RVW MEDS BY RX/DR IN RCRD: CPT | Mod: CPTII,S$GLB,, | Performed by: SURGERY

## 2023-05-24 PROCEDURE — 99024 PR POST-OP FOLLOW-UP VISIT: ICD-10-PCS | Mod: S$GLB,,, | Performed by: SURGERY

## 2023-05-24 PROCEDURE — 1101F PT FALLS ASSESS-DOCD LE1/YR: CPT | Mod: CPTII,S$GLB,, | Performed by: SURGERY

## 2023-05-24 PROCEDURE — 99024 POSTOP FOLLOW-UP VISIT: CPT | Mod: S$GLB,,, | Performed by: SURGERY

## 2023-05-24 PROCEDURE — 99999 PR PBB SHADOW E&M-EST. PATIENT-LVL IV: ICD-10-PCS | Mod: PBBFAC,,, | Performed by: SURGERY

## 2023-05-24 PROCEDURE — 3078F PR MOST RECENT DIASTOLIC BLOOD PRESSURE < 80 MM HG: ICD-10-PCS | Mod: CPTII,S$GLB,, | Performed by: SURGERY

## 2023-05-24 PROCEDURE — 1159F MED LIST DOCD IN RCRD: CPT | Mod: CPTII,S$GLB,, | Performed by: SURGERY

## 2023-05-24 PROCEDURE — 1126F AMNT PAIN NOTED NONE PRSNT: CPT | Mod: CPTII,S$GLB,, | Performed by: SURGERY

## 2023-05-24 PROCEDURE — 3288F FALL RISK ASSESSMENT DOCD: CPT | Mod: CPTII,S$GLB,, | Performed by: SURGERY

## 2023-05-24 PROCEDURE — 3078F DIAST BP <80 MM HG: CPT | Mod: CPTII,S$GLB,, | Performed by: SURGERY

## 2023-05-24 PROCEDURE — 3077F SYST BP >= 140 MM HG: CPT | Mod: CPTII,S$GLB,, | Performed by: SURGERY

## 2023-05-24 PROCEDURE — 1126F PR PAIN SEVERITY QUANTIFIED, NO PAIN PRESENT: ICD-10-PCS | Mod: CPTII,S$GLB,, | Performed by: SURGERY

## 2023-05-24 PROCEDURE — 1160F PR REVIEW ALL MEDS BY PRESCRIBER/CLIN PHARMACIST DOCUMENTED: ICD-10-PCS | Mod: CPTII,S$GLB,, | Performed by: SURGERY

## 2023-05-24 PROCEDURE — 3077F PR MOST RECENT SYSTOLIC BLOOD PRESSURE >= 140 MM HG: ICD-10-PCS | Mod: CPTII,S$GLB,, | Performed by: SURGERY

## 2023-05-24 PROCEDURE — 3288F PR FALLS RISK ASSESSMENT DOCUMENTED: ICD-10-PCS | Mod: CPTII,S$GLB,, | Performed by: SURGERY

## 2023-05-24 PROCEDURE — 1159F PR MEDICATION LIST DOCUMENTED IN MEDICAL RECORD: ICD-10-PCS | Mod: CPTII,S$GLB,, | Performed by: SURGERY

## 2023-05-24 NOTE — PROGRESS NOTES
C3 nurse spoke with Nelsy Marino for a TCC post hospital discharge follow up call. The patient has a scheduled HOSFU appointment with Rivas Rojas NP  on 5/31/23 @ 8am.

## 2023-05-24 NOTE — PROGRESS NOTES
"        Plastic Surgery Clinic Postop Visit    Subjective:      Nelsy Marino is a 82 y.o. year old female who presents to the Plastic Surgery Clinic on 05/24/2023 for follow up visit status post left rectus femoris muscle flap to cover left groin vascular graft. Patient was recently readmitted in hospital but was discharged this week. Patient feel fine, no complaints. Denies fever, chills, nausea, vomiting, or other systemic signs of infection.    Vitals:    05/24/23 1118   BP: (!) 159/68   Pulse: 78        Review of patient's allergies indicates:   Allergen Reactions    Motrin [ibuprofen] Itching       Current Outpatient Medications on File Prior to Visit   Medication Sig Dispense Refill    acetaminophen (TYLENOL) 500 MG tablet Take 2 tablets (1,000 mg total) by mouth every 8 (eight) hours.  0    amLODIPine (NORVASC) 10 MG tablet Take 1 tablet (10 mg total) by mouth once daily. 90 tablet 0    atorvastatin (LIPITOR) 40 MG tablet Take 1 tablet (40 mg total) by mouth every evening. 30 tablet 0    BLOOD SUGAR DIAGNOSTIC (TRUE METRIX GLUCOSE TEST STRIP MISC) by Misc.(Non-Drug; Combo Route) route.      carvediloL (COREG) 25 MG tablet Take 1 tablet (25 mg total) by mouth 2 (two) times daily. 60 tablet 11    diphenhydrAMINE (BENADRYL) 25 mg capsule Take 25 mg by mouth daily as needed for Itching.      DULoxetine (CYMBALTA) 30 MG capsule Take 1 capsule (30 mg total) by mouth 2 (two) times daily. 60 capsule 0    hydrALAZINE (APRESOLINE) 25 MG tablet Take 3 tablets (75 mg total) by mouth every 8 (eight) hours. 270 tablet 11    insulin detemir U-100, Levemir, 100 unit/mL (3 mL) SubQ InPn pen Inject 5 Units into the skin every evening. 6 mL 3    insulin syringe-needle,dispos. 0.3 mL 30 gauge x 5/16" Syrg by Misc.(Non-Drug; Combo Route) route.      oxyCODONE (ROXICODONE) 5 MG immediate release tablet Take 1 tablet (5 mg total) by mouth every 12 (twelve) hours as needed. 14 tablet 0    pantoprazole (PROTONIX) 40 MG tablet Take " "1 tablet (40 mg total) by mouth once daily. 90 tablet 0    pen needle, diabetic 32 gauge x 5/32" Ndle Inject 1 each into the skin 4 (four) times daily. 100 each 0    vitamin D (VITAMIN D3) 1000 units Tab Take 1 tablet (1,000 Units total) by mouth once daily.       No current facility-administered medications on file prior to visit.       Patient Active Problem List   Diagnosis    Lichen sclerosus et atrophicus    PAD (peripheral artery disease)    Essential hypertension    Type 2 diabetes mellitus with chronic kidney disease, with long-term current use of insulin    Diastolic CHF, chronic    Obesity (BMI 30-39.9)    Critical right lower limb ischemia    Acute hypoxemic respiratory failure    History of DVT (deep vein thrombosis)    Wound dehiscence    Fluid collection at surgical site    Cellulitis    Postoperative seroma of musculoskeletal structure after non-musculoskeletal procedure    Abscess of right groin    Critical limb ischemia with history of revascularization of same extremity    Sepsis    Hyponatremia    Vascular graft infection    S/P transmetatarsal amputation of foot, right    Leukocytosis    Acute encephalopathy    Bilious vomiting    Upper GI bleed    Diabetic foot infection    Melena    Eschar of foot    Acute on chronic anemia    LOLY (acute kidney injury)    Skin ulcer of right heel, limited to breakdown of skin    Debility    Vascular inflammation or infection    Wound infection    Somnolence    Moderate malnutrition    Hypokalemia    Hypernatremia    Vitamin D deficiency    Diarrhea of presumed infectious origin    Hypomagnesemia    Removal of staple    S/P femoral-femoral bypass surgery    Change or removal of drains       Past Surgical History:   Procedure Laterality Date     femoral vascular surgery       ANGIOGRAPHY OF LOWER EXTREMITY Right 2/23/2023    Procedure: Angiogram Extremity Unilateral;  Surgeon: Danny Dunbar MD;  Location: [facility] OR;  Service: Vascular;  Laterality: Right;  " RN PHONE PREOP 2/20/2023----CK CONSENT    ANGIOGRAPHY OF LOWER EXTREMITY Right 4/4/2023    Procedure: Angiogram Extremity Unilateral - possible endovascular intervention;  Surgeon: René Simms MD;  [facility];  Service: Vascular;  Laterality: Right;  19.6 mins.  740.81 mGy  73.3399 Gy.cm  58ml dye  local - 1ml    COLONOSCOPY N/A 5/18/2023    Procedure: COLONOSCOPY;  Surgeon: Cuong Aguiar MD;  Location: [facility] ENDO;  Service: Endoscopy;  Laterality: N/A;    CREATION OF FEMORAL-FEMORAL ARTERY BYPASS WITH GRAFT Bilateral 3/2/2023    Procedure: CREATION, BYPASS, ARTERIAL, FEMORAL TO FEMORAL, USING GRAFT, LEFT FEMORAL ENDARTERECTOMY, LEFT ILIAC ARTERY STENT PLACEMENT, RIGHT LOWER EXTREMITY ANGIOGRAM AND RIGHT SFA  ANGIOPLASTY;  Surgeon: Danny Dunbar MD;  [facility];  Service: Vascular;  Laterality: Bilateral;   744.47 MGY  175.36 Gycm  Flouro time 20.5 mins      CREATION OF ILIOFEMORAL ARTERY BYPASS Right 3/20/2019    Procedure: CREATION, BYPASS, ARTERIAL, ILIAC TO FEMORAL, RIGHT LOWER EXTREMITY, RIGHT PROFUNDAPLASTY;  Surgeon: Danny Dunbar MD;  Location: [facility] OR;  Service: Vascular;  Laterality: Right;  11:00AM START PER ANNE  RN PREOP 3/13/2019  ACT'S, CELL SAVER, GRAFTS, BOOK WATER---NEED H/P  CONSENT IN AM------HGBA1C    CREATION OF MUSCLE ROTATIONAL FLAP Left 3/31/2023    Procedure: CREATION, FLAP, MUSCLE ROTATION;  Surgeon: Brandon Smiley MD;  [facility];  Service: Plastics;  Laterality: Left;    ENDOSCOPY OF PROXIMAL SMALL INTESTINE N/A 5/18/2023    Procedure: ENTEROSCOPY, PROXIMAL;  Surgeon: Cuong Aguiar MD;  Location: [facility] ENDO;  Service: Endoscopy;  Laterality: N/A;    ESOPHAGOGASTRODUODENOSCOPY N/A 3/17/2023    Procedure: EGD (ESOPHAGOGASTRODUODENOSCOPY);  Surgeon: Gee Rodriguez MD;  Location: [facility] ENDO (2ND FLR);  Service: Endoscopy;  Laterality: N/A;    ESOPHAGOGASTRODUODENOSCOPY N/A 4/11/2023    Procedure: EGD  (ESOPHAGOGASTRODUODENOSCOPY);  Surgeon: Nazanin Kaiser MD;  Location: [facility] ENDO (2ND FLR);  Service: Endoscopy;  Laterality: N/A;    ESOPHAGOGASTRODUODENOSCOPY N/A 2023    Procedure: EGD (ESOPHAGOGASTRODUODENOSCOPY);  Surgeon: Gee Rodriguez MD;  Location: [facility] ENDO (2ND FLR);  Service: Endoscopy;  Laterality: N/A;    ESOPHAGOGASTRODUODENOSCOPY N/A 2023    Procedure: EGD (ESOPHAGOGASTRODUODENOSCOPY);  Surgeon: Cuong Conteh MD;  Location: [facility] ENDO (2ND FLR);  Service: Endoscopy;  Laterality: N/A;    FOOT AMPUTATION THROUGH METATARSAL Right 3/4/2023    Procedure: AMPUTATION, FOOT, TRANSMETATARSAL;  Surgeon: Benjamin Godfrey DPM;  [facility];  Service: Podiatry;  Laterality: Right;    HYSTERECTOMY      ?unsure cervix present    INCISION AND DRAINAGE Right 2019    Procedure: Incision and Drainage - R groin washout, possible muscle flap;  Surgeon: Danny Dunbar MD;  [facility];  Service: Vascular;  Laterality: Right;  groin washout    INCISION AND DRAINAGE OF GROIN Right 5/3/2019    Procedure: Incision and Drainage R groin;  Surgeon: Danny Dunbar MD;  [facility];  Service: Vascular;  Laterality: Right;    WOUND DEBRIDEMENT Left 3/31/2023    Procedure: DEBRIDEMENT, WOUND;  Surgeon: René Simms MD;  [facility];  Service: Vascular;  Laterality: Left;       Social History     Socioeconomic History    Marital status:    Tobacco Use    Smoking status: Former     Types: Cigarettes     Quit date: 1980     Years since quittin.4    Smokeless tobacco: Never   Substance and Sexual Activity    Alcohol use: No    Drug use: No    Sexual activity: Never     Social Determinants of Health     Financial Resource Strain: Unknown    Difficulty of Paying Living Expenses: Patient refused   Food Insecurity: No Food Insecurity    Worried About Running Out of Food in the Last Year: Never true    Ran Out of Food in the Last Year:  Never true   Transportation Needs: No Transportation Needs    Lack of Transportation (Medical): No    Lack of Transportation (Non-Medical): No   Physical Activity: Inactive    Days of Exercise per Week: 0 days    Minutes of Exercise per Session: 0 min   Stress: Unknown    Feeling of Stress : Patient refused   Social Connections: Unknown    Frequency of Communication with Friends and Family: More than three times a week    Frequency of Social Gatherings with Friends and Family: More than three times a week    Attends Sikhism Services: Patient refused    Active Member of Clubs or Organizations: Patient refused    Attends Club or Organization Meetings: Patient refused    Marital Status:    Housing Stability: Low Risk     Unable to Pay for Housing in the Last Year: No    Number of Places Lived in the Last Year: 1    Unstable Housing in the Last Year: No           Review of Systems: negative unless otherwise stated    Objective:     Physical Exam:  Vitals:    05/24/23 1118   BP: (!) 159/68   Pulse: 78       WD WN NAD  VSS  Normal resp effort  Left groin with good granulation tissue 3 x 3 cm wound healing well and at skin level        Assessment:       1. Vascular graft infection, subsequent encounter    2. Wound dehiscence        Plan:   82 y.o. female status post left rectus femoris muscle flap to cover vascular graft in left groin  - Doing well, no issues  - drain removed today  - Return to clinic in 2 weeks       All questions were answered. The patient was advised to call the clinic with any questions or concerns prior to their next visit.     Gregory Ireland MD- Fellow

## 2023-05-25 LAB
FINAL PATHOLOGIC DIAGNOSIS: NORMAL
Lab: NORMAL

## 2023-05-30 NOTE — DISCHARGE SUMMARY
[facility] Medicine  Discharge Summary      Patient Name: Nelsy Marino  MRN: [id number]  Patient Class: IP- Inpatient  Admission Date: 5/16/2023  Hospital Length of Stay: 6 days  Discharge Date and Time: 5/22/2023  1:07 PM  Attending Physician: No att. 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,who presented to the ED at the account of his primary physician after her home health nurse sent routine labs during her follow up,serum K was 2.1, she voiced ongoing diarrhea x 5 days,said to be multiple time daily,she's not walking as she's trying to take pressure of her legs per her surgeon, she voiced associated abdominal pain this morning,said to be generalized with nausea but no vomiting reported,she denied fever,chills or rigor,denied increase drainage from her her left hip surgical drain.She has no urinary symptom of dysuria,frequency,urgency,straining at micturition or hematuria,currently on zosyn with the stop day per spouse tomorrow 5/17/2023.  Labs at presentation showed serun K 2.1,Na 146,BUN/Cr 13/1.1, with H/H 7.1/21.8      Procedure(s) (LRB):  COLONOSCOPY (N/A)  ENTEROSCOPY, PROXIMAL (N/A)      Hospital Course:   Patient admitted to the hospital for hypokalemia- found on routing labs at home with H/H. K replaced. Noted to have a GI bleed. Transfused one unit of blood on 5/17. GI consulted. Hypo K likely from diarrhea. C-diff sent- negative. 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 C-scope showed  hemorrhage in ascending colon- treated. H/H remained stable. PT/OT consulted- and recommended SNF.  consulted. PPD placed.  Hgb stable. Pt dc in stable condition.        Goals of Care Treatment Preferences:  Code Status: Full Code      Consults:   Consults (From admission, onward)        Status Ordering Provider     Inpatient consult to Vascular Surgery  Once        Provider:  Brandon De La Rosa MD    Completed TONNY MAY     Inpatient virtual consult to Hospital Medicine  Once        Provider:  Tonny May MD    Completed JENNA CREWS     Inpatient consult to Social Work  Once        Provider:  (Not yet assigned)    Completed JENNA CREWS     Inpatient consult to Gastroenterology  Once        Provider:  Judy Jonas NP    Completed JENNA CREWS          No new Assessment & Plan notes have been filed under this hospital service since the last note was generated.  Service: Hospital Medicine    Final Active Diagnoses:    Diagnosis Date Noted POA    PRINCIPAL PROBLEM:  Hypokalemia [E87.6] 05/16/2023 Yes    Removal of staple [Z48.02] 05/22/2023 Not Applicable    S/P femoral-femoral bypass surgery [Z95.828] 05/22/2023 Not Applicable    Change or removal of drains [Z48.03] 05/22/2023 Not Applicable    Hypernatremia [E87.0] 05/16/2023 Yes    Vitamin D deficiency [E55.9] 05/16/2023 Yes    Diarrhea of presumed infectious origin [R19.7] 05/16/2023 Yes    Hypomagnesemia [E83.42] 05/16/2023 Yes    PAD (peripheral artery disease) [I73.9] 03/20/2019 Yes     Chronic    Essential hypertension [I10] 03/20/2019 Yes     Chronic    Type 2 diabetes mellitus with chronic kidney disease, with long-term current use of insulin [E11.22, Z79.4] 03/20/2019 Not Applicable     Chronic    Diastolic CHF, chronic [I50.32] 03/20/2019 Yes     Chronic    History of DVT (deep vein thrombosis) [Z86.718] 03/20/2019 Not Applicable     Chronic      Problems Resolved During this  Admission:       Discharged Condition: stable    Disposition: Home-Health Care [facility]    Follow Up:   Follow-up Information     Brandon Smiley MD Follow up on 5/24/2023.    Specialty: Plastic Surgery  Why: Wednesday, May 24, 2023 [facility] in the [facility] on [address] at 11:15 am with Dr. Smiley  Contact information:  [address]  [facility] [address]  [phone]             [facility] Follow up on 5/23/2023.    Why: [facility] - [phone]  2:00 PM  Contact information:  [address]  [place] [address]  [phone]             [facility] Follow up on 5/23/2023.    Specialties: Home Health Services, Physical Therapy, Occupational Therapy  Why: Home Health  Contact information:  [address]  [address]  [facility] [address]  [phone]                       Patient Instructions:      Diet Cardiac     Notify your health care provider if you experience any of the following:  temperature >100.4     Notify your health care provider if you experience any of the following:  persistent nausea and vomiting or diarrhea     Notify your health care provider if you experience any of the following:  severe uncontrolled pain     Notify your health care provider if you experience any of the following:  redness, tenderness, or signs of infection (pain, swelling, redness, odor or green/yellow discharge around incision site)     Notify your health care provider if you experience any of the following:  difficulty breathing or increased cough     Notify your health care provider if you experience any of the following:  severe persistent headache     Notify your health care provider if you experience any of the following:  worsening rash     Notify your health care provider if you experience any of the following:  persistent dizziness, light-headedness, or visual disturbances     Notify your health care provider if you experience any of the following:  increased  "confusion or weakness     Send follow up & questions to   Order Comments: Patient's primary care physician: Juan F Garay     Activity as tolerated       Significant Diagnostic Studies: Labs: CMP No results for input(s): NA, K, CL, CO2, GLU, BUN, CREATININE, CALCIUM, PROT, ALBUMIN, BILITOT, ALKPHOS, AST, ALT, ANIONGAP, ESTGFRAFRICA, EGFRNONAA in the last 48 hours. and CBC No results for input(s): WBC, HGB, HCT, PLT in the last 48 hours.    Pending Diagnostic Studies:     None         Medications:  Reconciled Home Medications:      Medication List      START taking these medications    hydrALAZINE 25 MG tablet  Commonly known as: APRESOLINE  Take 3 tablets (75 mg total) by mouth every 8 (eight) hours.        CONTINUE taking these medications    acetaminophen 500 MG tablet  Commonly known as: TYLENOL  Take 2 tablets (1,000 mg total) by mouth every 8 (eight) hours.     amLODIPine 10 MG tablet  Commonly known as: NORVASC  Take 1 tablet (10 mg total) by mouth once daily.     atorvastatin 40 MG tablet  Commonly known as: LIPITOR  Take 1 tablet (40 mg total) by mouth every evening.     BD SHERINE 2ND GEN PEN NEEDLE 32 gauge x 5/32" Ndle  Generic drug: pen needle, diabetic  Inject 1 each into the skin 4 (four) times daily.     carvediloL 25 MG tablet  Commonly known as: COREG  Take 1 tablet (25 mg total) by mouth 2 (two) times daily.     diphenhydrAMINE 25 mg capsule  Commonly known as: BENADRYL  Take 25 mg by mouth daily as needed for Itching.     DULoxetine 30 MG capsule  Commonly known as: CYMBALTA  Take 1 capsule (30 mg total) by mouth 2 (two) times daily.     insulin syringe-needle,dispos. 0.3 mL 30 gauge x 5/16" Syrg  by Misc.(Non-Drug; Combo Route) route.     LEVEMIR FLEXPEN 100 unit/mL (3 mL) Inpn pen  Generic drug: insulin detemir U-100 (Levemir)  Inject 5 Units into the skin every evening.     oxyCODONE 5 MG immediate release tablet  Commonly known as: ROXICODONE  Take 1 tablet (5 mg total) by mouth every 12 " (twelve) hours as needed.     pantoprazole 40 MG tablet  Commonly known as: PROTONIX  Take 1 tablet (40 mg total) by mouth once daily.     TRUE METRIX GLUCOSE TEST STRIP MISC  by Misc.(Non-Drug; Combo Route) route.     vitamin D 1000 units Tab  Commonly known as: VITAMIN D3  Take 1 tablet (1,000 Units total) by mouth once daily.        STOP taking these medications    dextrose 5 % in water (D5W) 5 % PgBk 100 mL with piperacillin-tazobactam 4.5 gram SolR 4.5 g     sodium chloride 0.9 % PgBk 100 mL with micafungin 100 mg SolR 100 mg            Indwelling Lines/Drains at time of discharge:   Lines/Drains/Airways     Drain  Duration                Closed/Suction Drain 03/31/23 0953 Left;Anterior;Lateral Thigh Bulb 15 Fr. 60 days                Time spent on the discharge of patient: 39 minutes         The attending portion of this evaluation, treatment, and documentation was performed per Rebecca May MD via Telemedicine AudioVisual using the secure [employer] software platform with 2 way audio/video. The provider was located off-site and the patient is located in the hospital. The aforementioned video software was utilized to document the relevant history and physical exam    Rebecca May MD  Department of Hospital Medicine  [facility] Surg

## 2023-05-31 ENCOUNTER — OFFICE VISIT (OUTPATIENT)
Dept: HOME HEALTH SERVICES | Facility: CLINIC | Age: 83
End: 2023-05-31
Payer: MEDICARE

## 2023-05-31 ENCOUNTER — DOCUMENT SCAN (OUTPATIENT)
Dept: HOME HEALTH SERVICES | Facility: HOSPITAL | Age: 83
End: 2023-05-31
Payer: MEDICARE

## 2023-05-31 DIAGNOSIS — E11.22 TYPE 2 DIABETES MELLITUS WITH CHRONIC KIDNEY DISEASE, WITH LONG-TERM CURRENT USE OF INSULIN, UNSPECIFIED CKD STAGE: Chronic | ICD-10-CM

## 2023-05-31 DIAGNOSIS — I50.32 DIASTOLIC CHF, CHRONIC: ICD-10-CM

## 2023-05-31 DIAGNOSIS — R60.0 EDEMA OF LEFT FOOT: ICD-10-CM

## 2023-05-31 DIAGNOSIS — Z79.4 TYPE 2 DIABETES MELLITUS WITH CHRONIC KIDNEY DISEASE, WITH LONG-TERM CURRENT USE OF INSULIN, UNSPECIFIED CKD STAGE: Chronic | ICD-10-CM

## 2023-05-31 DIAGNOSIS — K92.2 UPPER GI BLEED: Primary | ICD-10-CM

## 2023-05-31 PROCEDURE — 1126F PR PAIN SEVERITY QUANTIFIED, NO PAIN PRESENT: ICD-10-PCS | Mod: CPTII,S$GLB,, | Performed by: NURSE PRACTITIONER

## 2023-05-31 PROCEDURE — 3075F SYST BP GE 130 - 139MM HG: CPT | Mod: CPTII,S$GLB,, | Performed by: NURSE PRACTITIONER

## 2023-05-31 PROCEDURE — 1159F MED LIST DOCD IN RCRD: CPT | Mod: CPTII,S$GLB,, | Performed by: NURSE PRACTITIONER

## 2023-05-31 PROCEDURE — 3075F PR MOST RECENT SYSTOLIC BLOOD PRESS GE 130-139MM HG: ICD-10-PCS | Mod: CPTII,S$GLB,, | Performed by: NURSE PRACTITIONER

## 2023-05-31 PROCEDURE — 3078F DIAST BP <80 MM HG: CPT | Mod: CPTII,S$GLB,, | Performed by: NURSE PRACTITIONER

## 2023-05-31 PROCEDURE — 1126F AMNT PAIN NOTED NONE PRSNT: CPT | Mod: CPTII,S$GLB,, | Performed by: NURSE PRACTITIONER

## 2023-05-31 PROCEDURE — 3288F PR FALLS RISK ASSESSMENT DOCUMENTED: ICD-10-PCS | Mod: CPTII,S$GLB,, | Performed by: NURSE PRACTITIONER

## 2023-05-31 PROCEDURE — 1160F RVW MEDS BY RX/DR IN RCRD: CPT | Mod: CPTII,S$GLB,, | Performed by: NURSE PRACTITIONER

## 2023-05-31 PROCEDURE — 1111F DSCHRG MED/CURRENT MED MERGE: CPT | Mod: CPTII,S$GLB,, | Performed by: NURSE PRACTITIONER

## 2023-05-31 PROCEDURE — 1111F PR DISCHARGE MEDS RECONCILED W/ CURRENT OUTPATIENT MED LIST: ICD-10-PCS | Mod: CPTII,S$GLB,, | Performed by: NURSE PRACTITIONER

## 2023-05-31 PROCEDURE — 1159F PR MEDICATION LIST DOCUMENTED IN MEDICAL RECORD: ICD-10-PCS | Mod: CPTII,S$GLB,, | Performed by: NURSE PRACTITIONER

## 2023-05-31 PROCEDURE — 99350 PR HOME VISIT,ESTAB PATIENT,LEVEL IV: ICD-10-PCS | Mod: S$GLB,,, | Performed by: NURSE PRACTITIONER

## 2023-05-31 PROCEDURE — 99350 HOME/RES VST EST HIGH MDM 60: CPT | Mod: S$GLB,,, | Performed by: NURSE PRACTITIONER

## 2023-05-31 PROCEDURE — 3078F PR MOST RECENT DIASTOLIC BLOOD PRESSURE < 80 MM HG: ICD-10-PCS | Mod: CPTII,S$GLB,, | Performed by: NURSE PRACTITIONER

## 2023-05-31 PROCEDURE — 1160F PR REVIEW ALL MEDS BY PRESCRIBER/CLIN PHARMACIST DOCUMENTED: ICD-10-PCS | Mod: CPTII,S$GLB,, | Performed by: NURSE PRACTITIONER

## 2023-05-31 PROCEDURE — 1101F PR PT FALLS ASSESS DOC 0-1 FALLS W/OUT INJ PAST YR: ICD-10-PCS | Mod: CPTII,S$GLB,, | Performed by: NURSE PRACTITIONER

## 2023-05-31 PROCEDURE — 1101F PT FALLS ASSESS-DOCD LE1/YR: CPT | Mod: CPTII,S$GLB,, | Performed by: NURSE PRACTITIONER

## 2023-05-31 PROCEDURE — 3288F FALL RISK ASSESSMENT DOCD: CPT | Mod: CPTII,S$GLB,, | Performed by: NURSE PRACTITIONER

## 2023-06-02 RX ORDER — CARVEDILOL 25 MG/1
25 TABLET ORAL 2 TIMES DAILY
Qty: 60 TABLET | Refills: 0 | Status: ON HOLD | OUTPATIENT
Start: 2023-06-02 | End: 2023-08-03 | Stop reason: HOSPADM

## 2023-06-05 VITALS
OXYGEN SATURATION: 95 % | TEMPERATURE: 98 F | SYSTOLIC BLOOD PRESSURE: 130 MMHG | RESPIRATION RATE: 14 BRPM | HEART RATE: 70 BPM | DIASTOLIC BLOOD PRESSURE: 62 MMHG

## 2023-06-05 PROBLEM — R60.0 EDEMA OF LEFT FOOT: Status: ACTIVE | Noted: 2023-06-05

## 2023-06-05 NOTE — ASSESSMENT & PLAN NOTE
--see picture in note  --Podiatry and vascular referrals placed; patient instructed to schedule her appointments at her convenience.  --pulses present in bilateral lower extremities

## 2023-06-05 NOTE — ASSESSMENT & PLAN NOTE
--denies black tarry stool or blood in stools since hospital discharge  --denies syncopal episodes or dizziness  --last CBC stable  --plans to attend follow-up appointment with the GI; case management referral placed to assist with transportation

## 2023-06-05 NOTE — ASSESSMENT & PLAN NOTE
--BG WNL (90s-240s)  --diabetic supplies in the home    Hemoglobin A1C   Date Value Ref Range Status   02/28/2023 8.2 (H) 4.0 - 5.6 % Final     Comment:     ADA Screening Guidelines:  5.7-6.4%  Consistent with prediabetes  >or=6.5%  Consistent with diabetes    High levels of fetal hemoglobin interfere with the HbA1C  assay. Heterozygous hemoglobin variants (HbS, HgC, etc)do  not significantly interfere with this assay.   However, presence of multiple variants may affect accuracy.     03/20/2019 9.7 (H) 4.0 - 5.6 % Final     Comment:     ADA Screening Guidelines:  5.7-6.4%  Consistent with prediabetes  >or=6.5%  Consistent with diabetes  High levels of fetal hemoglobin interfere with the HbA1C  assay. Heterozygous hemoglobin variants (HbS, HgC, etc)do  not significantly interfere with this assay.   However, presence of multiple variants may affect accuracy.

## 2023-06-05 NOTE — PROGRESS NOTES
[facility]  Transition of Care Home Visit    Visit Date: 5/31/2023  Encounter Provider: Rivas Rojas   PCP:  Juan F Garay    PRESENTING HISTORY      Patient ID: Nelsy Marino is a 82 y.o. female.    Consult Requested By:  Dr. Danny Dunbar  Reason for Consult:  Hospital Follow Up.    Nelsy is being seen at home due to being seen at home due to physical debility that presents a taxing effort to leave the home, to mitigate high risk of hospital readmission and/or due to the limited availability of reliable or safe options for transportation to the point of access to the provider. Prior to treatment on this visit the chart was reviewed and patient verbal consent was obtained.      Chief Complaint: Transitional Care        History of Present Illness: Ms. Nelsy Marino is a 82 y.o. female who was recently admitted to the hospital.    HPI:   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,who presented to the ED at the account of his primary physician after her home health nurse sent routine labs during her follow up,serum K was 2.1, she voiced ongoing diarrhea x 5 days,said to be multiple time daily,she's not walking as she's trying to take pressure of her legs per her surgeon, she voiced associated abdominal pain this morning,said to be generalized with nausea but no vomiting reported,she denied fever,chills or rigor,denied increase drainage from her her left hip surgical drain.She has no urinary symptom of dysuria,frequency,urgency,straining at micturition or hematuria,currently on zosyn with the stop day per spouse tomorrow 5/17/2023.  Labs at presentation showed serun K  2.1,Na 146,BUN/Cr 13/1.1, with H/H 7.1/21.8        Procedure(s) (LRB):  COLONOSCOPY (N/A)  ENTEROSCOPY, PROXIMAL (N/A)       Hospital Course:   Patient admitted to the hospital for hypokalemia- found on routing labs at home with H/H. K replaced. Noted to have a GI bleed. Transfused one unit of blood on 5/17. GI consulted. Hypo K likely from diarrhea. C-diff sent- negative. 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 C-scope showed hemorrhage in ascending colon- treated. H/H remained stable. PT/OT consulted- and recommended SNF.  consulted. PPD placed.  Hgb stable. Pt dc in stable condition.         Goals of Care Treatment Preferences:  Code Status: Full Code  ___________________________________________________________________    Today: With this visit today patient is found sitting up in a chair on her patio working with physical therapy. Patient is AAOx3.  The patient [facility]/OT/RN.  Denies black tarry stool or blood in stool as well as syncopal episodes or dizziness since hospital discharge.  Her left foot is concerning for edema and pain; patient and family instructed that if the foot gets any worse she needs reports the ED.  I have placed referrals for vascular and Podiatry; daughter instructed to make appointment as soon as possible the [facility] scheduling desk.  photo below:        Patient endorses ability to perform ADls. Endorses eating x 3 meals per day, daily BMs, and adequate sleep pattern. Denies falls.  Denies smoking, alcohol abuse, illicit drug use. No additional needs at this this time. All of my information was given to the patient and family if any questions or concerns arise.     VSS. Denies fever, chest pain, shortness of breath, nausea, vomiting, diarrhea. Risks of environmental exposure to coronavirus discussed including: social distancing, hand hygiene, and limiting departures from the home  for necessities only.  Reports understanding and willingness to comply.  All hospital discharge orders reviewed and being followed, all medications reconciled and reviewed, patient and family verbalized understanding. No other needs identified at this time.     Review of Systems   Constitutional:  Negative for activity change and appetite change.   HENT:  Negative for congestion and dental problem.    Eyes:  Negative for discharge and itching.   Respiratory:  Negative for choking and chest tightness.    Cardiovascular:  Negative for chest pain and palpitations.   Gastrointestinal:  Negative for rectal pain and vomiting.   Endocrine: Negative for cold intolerance and heat intolerance.   Genitourinary:  Negative for enuresis and flank pain.   Musculoskeletal:  Negative for myalgias and neck pain.   Skin:  Negative for color change and wound.   Allergic/Immunologic: Negative for environmental allergies and food allergies.   Neurological:  Negative for tremors and syncope.   Hematological:  Does not bruise/bleed easily.   Psychiatric/Behavioral:  Negative for decreased concentration and dysphoric mood.      Assessments:  Environmental:  Clean, steps upon entrance  Functional Status:  Partial assist  Safety:  Moderate to high fall risk  Nutritional:  Adequate    PAST HISTORY:     Past Medical History:   Diagnosis Date    CHF (congestive heart failure)     Diabetes mellitus     DVT (deep venous thrombosis)     Hypertension     Miscarriage        Past Surgical History:   Procedure Laterality Date     femoral vascular surgery       ANGIOGRAPHY OF LOWER EXTREMITY Right 2/23/2023    Procedure: Angiogram Extremity Unilateral;  Surgeon: Danny Dunbar MD;  Location: [facility] OR;  Service: Vascular;  Laterality: Right;  RN PHONE PREOP 2/20/2023----CK CONSENT    ANGIOGRAPHY OF LOWER EXTREMITY Right 4/4/2023    Procedure: Angiogram Extremity Unilateral - possible endovascular intervention;  Surgeon: René Simms MD;   Location: [facility] OR 2ND FLR;  Service: Vascular;  Laterality: Right;  19.6 mins.  740.81 mGy  73.3399 Gy.cm  58ml dye  local - 1ml    COLONOSCOPY N/A 5/18/2023    Procedure: COLONOSCOPY;  Surgeon: Cuong Aguiar MD;  Location: [facility] ENDO;  Service: Endoscopy;  Laterality: N/A;    CREATION OF FEMORAL-FEMORAL ARTERY BYPASS WITH GRAFT Bilateral 3/2/2023    Procedure: CREATION, BYPASS, ARTERIAL, FEMORAL TO FEMORAL, USING GRAFT, LEFT FEMORAL ENDARTERECTOMY, LEFT ILIAC ARTERY STENT PLACEMENT, RIGHT LOWER EXTREMITY ANGIOGRAM AND RIGHT SFA  ANGIOPLASTY;  Surgeon: Danny Dunbar MD;  Location: [facility] OR 2ND FLR;  Service: Vascular;  Laterality: Bilateral;   744.47 MGY  175.36 Gycm  Flouro time 20.5 mins      CREATION OF ILIOFEMORAL ARTERY BYPASS Right 3/20/2019    Procedure: CREATION, BYPASS, ARTERIAL, ILIAC TO FEMORAL, RIGHT LOWER EXTREMITY, RIGHT PROFUNDAPLASTY;  Surgeon: Danny Dunbar MD;  Location: [facility];  Service: Vascular;  Laterality: Right;  11:00AM START PER NAZANIN RICHARDS PREOP 3/13/2019  ACT'S, CELL SAVER, GRAFTS, BOOK WATER---NEED H/P  CONSENT IN AM------HGBA1C    CREATION OF MUSCLE ROTATIONAL FLAP Left 3/31/2023    Procedure: CREATION, FLAP, MUSCLE ROTATION;  Surgeon: Brandon Smiley MD;  Location: [facility] OR 2ND FLR;  Service: Plastics;  Laterality: Left;    ENDOSCOPY OF PROXIMAL SMALL INTESTINE N/A 5/18/2023    Procedure: ENTEROSCOPY, PROXIMAL;  Surgeon: Cuong Aguiar MD;  Location: [facility] ENDO;  Service: Endoscopy;  Laterality: N/A;    ESOPHAGOGASTRODUODENOSCOPY N/A 3/17/2023    Procedure: EGD (ESOPHAGOGASTRODUODENOSCOPY);  Surgeon: Gee Rodriguez MD;  Location: [facility] ENDO (2ND FLR);  Service: Endoscopy;  Laterality: N/A;    ESOPHAGOGASTRODUODENOSCOPY N/A 4/11/2023    Procedure: EGD (ESOPHAGOGASTRODUODENOSCOPY);  Surgeon: Nazanin Kaiser MD;  Location: [facility] ENDO (2ND FLR);  Service: Endoscopy;  Laterality: N/A;    ESOPHAGOGASTRODUODENOSCOPY N/A 4/26/2023    Procedure: EGD  (ESOPHAGOGASTRODUODENOSCOPY);  Surgeon: Gee Rodriguez MD;  Location: [facility] ENDO (2ND FLR);  Service: Endoscopy;  Laterality: N/A;    ESOPHAGOGASTRODUODENOSCOPY N/A 2023    Procedure: EGD (ESOPHAGOGASTRODUODENOSCOPY);  Surgeon: Cuong Conteh MD;  Location: [facility] ENDO (2ND FLR);  Service: Endoscopy;  Laterality: N/A;    FOOT AMPUTATION THROUGH METATARSAL Right 3/4/2023    Procedure: AMPUTATION, FOOT, TRANSMETATARSAL;  Surgeon: Benjamin Godfrey DPM;  [facility];  Service: Podiatry;  Laterality: Right;    HYSTERECTOMY      ?unsure cervix present    INCISION AND DRAINAGE Right 2019    Procedure: Incision and Drainage - R groin washout, possible muscle flap;  Surgeon: Danny Dunbar MD;  [facility];  Service: Vascular;  Laterality: Right;  groin washout    INCISION AND DRAINAGE OF GROIN Right 5/3/2019    Procedure: Incision and Drainage R groin;  Surgeon: Danny Dunbar MD;  [facility];  Service: Vascular;  Laterality: Right;    WOUND DEBRIDEMENT Left 3/31/2023    Procedure: DEBRIDEMENT, WOUND;  Surgeon: René Simms MD;  [facility];  Service: Vascular;  Laterality: Left;       Family History   Problem Relation Age of Onset    Diabetes Father     Hypertension Father        Social History     Socioeconomic History    Marital status:    Tobacco Use    Smoking status: Former     Types: Cigarettes     Quit date: 1980     Years since quittin.4    Smokeless tobacco: Never   Substance and Sexual Activity    Alcohol use: No    Drug use: No    Sexual activity: Never     Social Determinants of Health     Financial Resource Strain: Unknown    Difficulty of Paying Living Expenses: Patient refused   Food Insecurity: No Food Insecurity    Worried About Running Out of Food in the Last Year: Never true    Ran Out of Food in the Last Year: Never true   Transportation Needs: No Transportation Needs    Lack of Transportation (Medical): No  "   Lack of Transportation (Non-Medical): No   Physical Activity: Inactive    Days of Exercise per Week: 0 days    Minutes of Exercise per Session: 0 min   Stress: Unknown    Feeling of Stress : Patient refused   Social Connections: Unknown    Frequency of Communication with Friends and Family: More than three times a week    Frequency of Social Gatherings with Friends and Family: More than three times a week    Attends Adventism Services: Patient refused    Active Member of Clubs or Organizations: Patient refused    Attends Club or Organization Meetings: Patient refused    Marital Status:    Housing Stability: Low Risk     Unable to Pay for Housing in the Last Year: No    Number of Places Lived in the Last Year: 1    Unstable Housing in the Last Year: No       MEDICATIONS & ALLERGIES:     Current Outpatient Medications on File Prior to Visit   Medication Sig Dispense Refill    acetaminophen (TYLENOL) 500 MG tablet Take 2 tablets (1,000 mg total) by mouth every 8 (eight) hours.  0    amLODIPine (NORVASC) 10 MG tablet Take 1 tablet (10 mg total) by mouth once daily. 90 tablet 0    atorvastatin (LIPITOR) 40 MG tablet Take 1 tablet (40 mg total) by mouth every evening. 30 tablet 0    BLOOD SUGAR DIAGNOSTIC (TRUE METRIX GLUCOSE TEST STRIP MISC) by Misc.(Non-Drug; Combo Route) route.      diphenhydrAMINE (BENADRYL) 25 mg capsule Take 25 mg by mouth daily as needed for Itching.      DULoxetine (CYMBALTA) 30 MG capsule Take 1 capsule (30 mg total) by mouth 2 (two) times daily. 60 capsule 0    hydrALAZINE (APRESOLINE) 25 MG tablet Take 3 tablets (75 mg total) by mouth every 8 (eight) hours. 270 tablet 11    insulin detemir U-100, Levemir, 100 unit/mL (3 mL) SubQ InPn pen Inject 5 Units into the skin every evening. 6 mL 3    insulin syringe-needle,dispos. 0.3 mL 30 gauge x 5/16" Syrg by Misc.(Non-Drug; Combo Route) route.      oxyCODONE (ROXICODONE) 5 MG immediate release tablet Take 1 tablet (5 mg total) by mouth " "every 12 (twelve) hours as needed. 14 tablet 0    pantoprazole (PROTONIX) 40 MG tablet Take 1 tablet (40 mg total) by mouth once daily. 90 tablet 0    pen needle, diabetic 32 gauge x 5/32" Ndle Inject 1 each into the skin 4 (four) times daily. 100 each 0    vitamin D (VITAMIN D3) 1000 units Tab Take 1 tablet (1,000 Units total) by mouth once daily.       No current facility-administered medications on file prior to visit.        Review of patient's allergies indicates:   Allergen Reactions    Motrin [ibuprofen] Itching       OBJECTIVE:     Vital Signs:  Vitals:    06/05/23 1152   BP: 130/62   Pulse: 70   Resp: 14   Temp: 97.8 °F (36.6 °C)     There is no height or weight on file to calculate BMI.     Physical Exam:  Physical Exam  Vitals reviewed.   Constitutional:       Appearance: She is well-developed.   HENT:      Head: Normocephalic and atraumatic.   Eyes:      Pupils: Pupils are equal, round, and reactive to light.   Cardiovascular:      Rate and Rhythm: Normal rate.   Pulmonary:      Effort: Pulmonary effort is normal.      Breath sounds: Normal breath sounds.   Abdominal:      General: Bowel sounds are normal.      Palpations: Abdomen is soft.   Musculoskeletal:         General: Normal range of motion.      Cervical back: Normal range of motion and neck supple.   Feet:      Comments: R foot wrapped  L foot discolored, edematous  Skin:     General: Skin is warm and dry.   Neurological:      Mental Status: She is alert and oriented to person, place, and time. Mental status is at baseline.      GCS: GCS eye subscore is 4. GCS verbal subscore is 5. GCS motor subscore is 6.   Psychiatric:         Attention and Perception: Attention normal.         Mood and Affect: Mood normal.         Speech: Speech normal.     Laboratory  Lab Results   Component Value Date    WBC 8.03 05/22/2023    HGB 9.6 (L) 05/22/2023    HCT 30.7 (L) 05/22/2023    MCV 86 05/22/2023     05/22/2023     Lab Results   Component Value Date "    INR 1.2 04/04/2023    INR 1.2 03/29/2023    INR 1.4 (H) 03/18/2023     Lab Results   Component Value Date    HGBA1C 8.2 (H) 02/28/2023     No results for input(s): POCTGLUCOSE in the last 72 hours.      TRANSITION OF CARE:     [facility] Contact Note: 5/24/23    Family and/or Caretaker present at visit?  Yes.  Diagnostic tests reviewed/disposition: No diagnosic tests pending after this hospitalization.  Disease/illness education: Importance of compliance with all prescribed medication and treatments, COVID precautions/Social Distancing/Mask Use  Home health/community services discussion/referrals: Patient has home health established at [facility] .   Establishment or re-establishment of referral orders for community resources: No other necessary community resources.   Discussion with other health care providers: No discussion with other health care providers necessary.     Transition of Care Visit:  I have reviewed and updated the history and problem list.  I have reconciled the medication list.  I have discussed the hospitalization and current medical issues, prognosis and plans with the patient/family.  I  spent more than 50% of time discussing the care with the patient/family.  Total Face-to-Face Encounter: 60 minutes.    Medications Reconciliation:   I have reconciled the patient's home medications and discharge medications with the patient/family. I have updated all changes.  Refer to After-Visit Medication List.    I have discussed discharge plans, follow-up instructions, future appointments, provider contact information, indicators to seek medical emergency treatment, and advisement to call with additional questions or concerns. Patient and caregiver verbalize understanding.    ASSESSMENT & PLAN:       1. Upper GI bleed  Assessment & Plan:  --denies black tarry stool or blood in stools since hospital discharge  --denies syncopal episodes or dizziness  --last CBC stable  --plans to attend follow-up  appointment with the GI; case management referral placed to assist with transportation      2. Diastolic CHF, chronic  -     Ambulatory referral/consult to [facility]  -     Ambulatory referral/consult to Outpatient Case Management    3. Type 2 diabetes mellitus with chronic kidney disease, with long-term current use of insulin, unspecified CKD stage  Assessment & Plan:  --BG WNL (90s-240s)  --diabetic supplies in the home    Hemoglobin A1C   Date Value Ref Range Status   02/28/2023 8.2 (H) 4.0 - 5.6 % Final     Comment:     ADA Screening Guidelines:  5.7-6.4%  Consistent with prediabetes  >or=6.5%  Consistent with diabetes    High levels of fetal hemoglobin interfere with the HbA1C  assay. Heterozygous hemoglobin variants (HbS, HgC, etc)do  not significantly interfere with this assay.   However, presence of multiple variants may affect accuracy.     03/20/2019 9.7 (H) 4.0 - 5.6 % Final     Comment:     ADA Screening Guidelines:  5.7-6.4%  Consistent with prediabetes  >or=6.5%  Consistent with diabetes  High levels of fetal hemoglobin interfere with the HbA1C  assay. Heterozygous hemoglobin variants (HbS, HgC, etc)do  not significantly interfere with this assay.   However, presence of multiple variants may affect accuracy.             4. Edema of left foot  Assessment & Plan:  --see picture in note  --Podiatry and vascular referrals placed; patient instructed to schedule her appointments at her convenience.  --pulses present in bilateral lower extremities    Orders:  -     Ambulatory referral/consult to Vascular Surgery; Future; Expected date: 06/12/2023  -     Ambulatory referral/consult to Podiatry; Future; Expected date: 06/12/2023    Other orders  -     carvediloL (COREG) 25 MG tablet; Take 1 tablet (25 mg total) by mouth 2 (two) times daily.  Dispense: 60 tablet; Refill: 0         Were controlled substances prescribed?  No    Instructions for the patient:    Scheduled Follow-up  ":  Future Appointments   Date Time Provider Department Center   6/7/2023  1:00 PM Caty Molina MD [facility] ID [address]   6/8/2023  1:30 PM Shani Andrade MD [facility] [address]   6/13/2023  1:30 PM VASCULAR ULTRASOUND, [facility] EKG [facility]   6/13/2023  2:30 PM VASCULAR ULTRASOUND, [facility] EKG [facility]   6/13/2023  3:15 PM Danny Dunbar MD [facility] VAS JALEEL [facility]   7/18/2023 11:00 AM Gaby Walker MD [facility] GASTRO [facility]       After Visit Medication List :     Medication List            Accurate as of May 31, 2023 11:59 PM. If you have any questions, ask your nurse or doctor.                CONTINUE taking these medications      acetaminophen 500 MG tablet  Commonly known as: TYLENOL  Take 2 tablets (1,000 mg total) by mouth every 8 (eight) hours.     amLODIPine 10 MG tablet  Commonly known as: NORVASC  Take 1 tablet (10 mg total) by mouth once daily.     atorvastatin 40 MG tablet  Commonly known as: LIPITOR  Take 1 tablet (40 mg total) by mouth every evening.     BD SHERINE 2ND GEN PEN NEEDLE 32 gauge x 5/32" Ndle  Generic drug: pen needle, diabetic  Inject 1 each into the skin 4 (four) times daily.     carvediloL 25 MG tablet  Commonly known as: COREG  Take 1 tablet (25 mg total) by mouth 2 (two) times daily.     diphenhydrAMINE 25 mg capsule  Commonly known as: BENADRYL     DULoxetine 30 MG capsule  Commonly known as: CYMBALTA  Take 1 capsule (30 mg total) by mouth 2 (two) times daily.     hydrALAZINE 25 MG tablet  Commonly known as: APRESOLINE  Take 3 tablets (75 mg total) by mouth every 8 (eight) hours.     insulin syringe-needle,dispos. 0.3 mL 30 gauge x 5/16" Syrg     LEVEMIR FLEXPEN 100 unit/mL (3 mL) Inpn pen  Generic drug: insulin detemir U-100 (Levemir)  Inject 5 Units into the skin every evening.     oxyCODONE 5 MG immediate release tablet  Commonly known as: ROXICODONE  Take 1 tablet (5 mg total) by mouth every 12 (twelve) hours as needed.     pantoprazole 40 MG " tablet  Commonly known as: PROTONIX  Take 1 tablet (40 mg total) by mouth once daily.     TRUE METRIX GLUCOSE TEST STRIP MISC     vitamin D 1000 units Tab  Commonly known as: VITAMIN D3  Take 1 tablet (1,000 Units total) by mouth once daily.               Where to Get Your Medications        These medications were sent to [facility]  [place] [address] [address]      Phone: [phone]   carvediloL 25 MG tablet         Signature: Rivas Rojas NP

## 2023-06-07 ENCOUNTER — TELEPHONE (OUTPATIENT)
Dept: ADMINISTRATIVE | Facility: HOSPITAL | Age: 83
End: 2023-06-07
Payer: MEDICARE

## 2023-06-07 ENCOUNTER — OFFICE VISIT (OUTPATIENT)
Dept: INFECTIOUS DISEASES | Facility: CLINIC | Age: 83
End: 2023-06-07
Payer: MEDICARE

## 2023-06-07 VITALS
SYSTOLIC BLOOD PRESSURE: 153 MMHG | HEART RATE: 75 BPM | DIASTOLIC BLOOD PRESSURE: 73 MMHG | BODY MASS INDEX: 24.98 KG/M2 | HEIGHT: 65 IN | TEMPERATURE: 98 F | WEIGHT: 149.94 LBS

## 2023-06-07 DIAGNOSIS — Z79.2 CHRONIC ANTIBIOTIC SUPPRESSION: ICD-10-CM

## 2023-06-07 DIAGNOSIS — T82.7XXD VASCULAR GRAFT INFECTION, SUBSEQUENT ENCOUNTER: Primary | ICD-10-CM

## 2023-06-07 PROCEDURE — 3077F PR MOST RECENT SYSTOLIC BLOOD PRESSURE >= 140 MM HG: ICD-10-PCS | Mod: CPTII,S$GLB,, | Performed by: INTERNAL MEDICINE

## 2023-06-07 PROCEDURE — 99214 OFFICE O/P EST MOD 30 MIN: CPT | Mod: S$GLB,,, | Performed by: INTERNAL MEDICINE

## 2023-06-07 PROCEDURE — 99214 PR OFFICE/OUTPT VISIT, EST, LEVL IV, 30-39 MIN: ICD-10-PCS | Mod: S$GLB,,, | Performed by: INTERNAL MEDICINE

## 2023-06-07 PROCEDURE — 1111F PR DISCHARGE MEDS RECONCILED W/ CURRENT OUTPATIENT MED LIST: ICD-10-PCS | Mod: CPTII,S$GLB,, | Performed by: INTERNAL MEDICINE

## 2023-06-07 PROCEDURE — 1101F PR PT FALLS ASSESS DOC 0-1 FALLS W/OUT INJ PAST YR: ICD-10-PCS | Mod: CPTII,S$GLB,, | Performed by: INTERNAL MEDICINE

## 2023-06-07 PROCEDURE — 1111F DSCHRG MED/CURRENT MED MERGE: CPT | Mod: CPTII,S$GLB,, | Performed by: INTERNAL MEDICINE

## 2023-06-07 PROCEDURE — 3078F PR MOST RECENT DIASTOLIC BLOOD PRESSURE < 80 MM HG: ICD-10-PCS | Mod: CPTII,S$GLB,, | Performed by: INTERNAL MEDICINE

## 2023-06-07 PROCEDURE — 99999 PR PBB SHADOW E&M-EST. PATIENT-LVL IV: ICD-10-PCS | Mod: PBBFAC,,, | Performed by: INTERNAL MEDICINE

## 2023-06-07 PROCEDURE — 1101F PT FALLS ASSESS-DOCD LE1/YR: CPT | Mod: CPTII,S$GLB,, | Performed by: INTERNAL MEDICINE

## 2023-06-07 PROCEDURE — 1125F AMNT PAIN NOTED PAIN PRSNT: CPT | Mod: CPTII,S$GLB,, | Performed by: INTERNAL MEDICINE

## 2023-06-07 PROCEDURE — 3288F PR FALLS RISK ASSESSMENT DOCUMENTED: ICD-10-PCS | Mod: CPTII,S$GLB,, | Performed by: INTERNAL MEDICINE

## 2023-06-07 PROCEDURE — 99999 PR PBB SHADOW E&M-EST. PATIENT-LVL IV: CPT | Mod: PBBFAC,,, | Performed by: INTERNAL MEDICINE

## 2023-06-07 PROCEDURE — 3078F DIAST BP <80 MM HG: CPT | Mod: CPTII,S$GLB,, | Performed by: INTERNAL MEDICINE

## 2023-06-07 PROCEDURE — 3077F SYST BP >= 140 MM HG: CPT | Mod: CPTII,S$GLB,, | Performed by: INTERNAL MEDICINE

## 2023-06-07 PROCEDURE — 1125F PR PAIN SEVERITY QUANTIFIED, PAIN PRESENT: ICD-10-PCS | Mod: CPTII,S$GLB,, | Performed by: INTERNAL MEDICINE

## 2023-06-07 PROCEDURE — 3288F FALL RISK ASSESSMENT DOCD: CPT | Mod: CPTII,S$GLB,, | Performed by: INTERNAL MEDICINE

## 2023-06-07 RX ORDER — FLUCONAZOLE 200 MG/1
400 TABLET ORAL DAILY
Qty: 60 TABLET | Refills: 11 | Status: ON HOLD | OUTPATIENT
Start: 2023-06-07 | End: 2023-08-03 | Stop reason: HOSPADM

## 2023-06-07 RX ORDER — CEPHALEXIN 500 MG/1
500 CAPSULE ORAL EVERY 12 HOURS
Qty: 60 CAPSULE | Refills: 11 | Status: ON HOLD | OUTPATIENT
Start: 2023-06-07 | End: 2023-08-03 | Stop reason: HOSPADM

## 2023-06-07 NOTE — PROGRESS NOTES
Infectious Disease Clinic  [facility]    Subjective:      Patient ID:. Mrs. Nelsy Marino is a 82 y.o. female       Chief Complaint:   Chief Complaint   Patient presents with    Hospital Follow Up       History of Present Illness    A 82 y.o. female patient with An 82-year-old woman with HTN, DMII, DVT, CHF, PAD, DVT, left to right fem to SFA bypass and right SFA angioplasty with stent placement on 03/2/23, complicated by wound dehiscence of the left groin with graft exposure, previously on cefepime plus micafungin (EOC5/17/23) and transitioned to Zosyn from cefepime due to neurotoxicity who is seen for hospital follow up. She completed her antimicrobial therapy while admitted. She has not been restarted on oral suppression. She and her  report the wound is doing well and healing however they are worried about her left foot.     Review of Systems   Constitutional: Positive for weight loss. Negative for chills, decreased appetite, fever, malaise/fatigue, night sweats and weight gain.   HENT:  Negative for congestion, ear pain, hearing loss, hoarse voice, sore throat and tinnitus.    Eyes:  Negative for blurred vision, redness and visual disturbance.   Cardiovascular:  Negative for chest pain, leg swelling and palpitations.   Respiratory:  Negative for cough, hemoptysis, shortness of breath, sputum production and wheezing.    Hematologic/Lymphatic: Negative for adenopathy. Does not bruise/bleed easily.   Skin:  Positive for itching. Negative for dry skin, rash and suspicious lesions.   Musculoskeletal:  Positive for joint pain, myalgias and neck pain. Negative for back pain.   Gastrointestinal:  Negative for abdominal pain, constipation, diarrhea, heartburn, nausea and vomiting.   Genitourinary:  Negative for dysuria, flank pain, frequency, hematuria, hesitancy and urgency.   Neurological:  Negative for dizziness, headaches, numbness, paresthesias and weakness.   Psychiatric/Behavioral:  Negative  for depression and memory loss. The patient does not have insomnia and is not nervous/anxious.      Objective:     Physical Exam  Vitals and nursing note reviewed.   Constitutional:       Appearance: She is well-developed.   HENT:      Head: Normocephalic and atraumatic.      Right Ear: External ear normal.      Left Ear: External ear normal.   Eyes:      General: No scleral icterus.        Right eye: No discharge.         Left eye: No discharge.      Conjunctiva/sclera: Conjunctivae normal.   Cardiovascular:      Rate and Rhythm: Normal rate and regular rhythm.      Heart sounds: Normal heart sounds. No murmur heard.    No friction rub. No gallop.   Pulmonary:      Effort: Pulmonary effort is normal. No respiratory distress.      Breath sounds: Normal breath sounds. No stridor. No wheezing or rales.   Abdominal:      General: Bowel sounds are normal.   Musculoskeletal:         General: No tenderness.      Comments: Left foot skin is shinny and taught. Dark discoloration about the foot noted.    Skin:     General: Skin is warm and dry.   Neurological:      Mental Status: She is alert and oriented to person, place, and time.       Assessment:       ICD-10-CM ICD-9-CM   1. Vascular graft infection, subsequent encounter  T82.7XXD V58.89     996.62   2. Chronic antibiotic suppression  Z79.2 V58.62       Plan:   I have reviewed clinic notes, laboratory, imaging tests, and pathology from the referring provider and other providers, as indicated for this visit, with my interpretation as documented below.     Patient with known infection involving her vascular graft in the left groin. Has completed a course of IV antimicrobials however was not resumed on oral antibiotic suppression  as per recommendations. At this time she does not have any signs or symptoms of recurrent infection. Left foot appears to have some ischemic changes which is being managed by Vascular Surgery.  Start antibiotic suppression with below.  Labs in  2-3 months  Orders Placed This Encounter    Comprehensive Metabolic Panel    cephALEXin (KEFLEX) 500 MG capsule    fluconazole (DIFLUCAN) 200 MG Tab   RTC in 3 months

## 2023-06-07 NOTE — ANESTHESIA RELEASE NOTE
"Anesthesia Release from PACU Note    Patient: Nelsy Marino    Procedure(s) Performed: Procedure(s) (LRB):  Incision and Drainage - R groin washout, possible muscle flap (Right)    Anesthesia type: general    Post pain: Adequate analgesia    Post assessment: no apparent anesthetic complications and tolerated procedure well    Last Vitals:   Visit Vitals  BP (!) 157/69   Pulse 98   Temp 36.8 °C (98.3 °F) (Oral)   Resp 20   Ht 5' 2" (1.575 m)   Wt 76 kg (167 lb 8 oz)   LMP  (LMP Unknown)   SpO2 (!) 94%   Breastfeeding? No   BMI 30.64 kg/m²       Post vital signs: stable    Level of consciousness: awake and alert     Nausea/Vomiting: no nausea/no vomiting    Complications: none    Airway Patency: patent    Respiratory: unassisted, spontaneous ventilation, room air    Cardiovascular: stable and blood pressure at baseline    Hydration: euvolemic  " Parents

## 2023-06-08 ENCOUNTER — LAB VISIT (OUTPATIENT)
Dept: LAB | Facility: HOSPITAL | Age: 83
End: 2023-06-08
Attending: INTERNAL MEDICINE
Payer: MEDICARE

## 2023-06-08 ENCOUNTER — OFFICE VISIT (OUTPATIENT)
Dept: HEMATOLOGY/ONCOLOGY | Facility: CLINIC | Age: 83
End: 2023-06-08
Payer: MEDICARE

## 2023-06-08 VITALS
HEART RATE: 74 BPM | TEMPERATURE: 98 F | RESPIRATION RATE: 18 BRPM | SYSTOLIC BLOOD PRESSURE: 116 MMHG | BODY MASS INDEX: 24.95 KG/M2 | DIASTOLIC BLOOD PRESSURE: 57 MMHG | HEIGHT: 65 IN | OXYGEN SATURATION: 98 %

## 2023-06-08 DIAGNOSIS — N18.9 ANEMIA IN CHRONIC KIDNEY DISEASE, UNSPECIFIED CKD STAGE: ICD-10-CM

## 2023-06-08 DIAGNOSIS — D63.1 ANEMIA IN CHRONIC KIDNEY DISEASE, UNSPECIFIED CKD STAGE: ICD-10-CM

## 2023-06-08 LAB
BASOPHILS # BLD AUTO: 0.01 K/UL (ref 0–0.2)
BASOPHILS NFR BLD: 0.2 % (ref 0–1.9)
DIFFERENTIAL METHOD: ABNORMAL
EOSINOPHIL # BLD AUTO: 0.3 K/UL (ref 0–0.5)
EOSINOPHIL NFR BLD: 4.3 % (ref 0–8)
ERYTHROCYTE [DISTWIDTH] IN BLOOD BY AUTOMATED COUNT: 14.3 % (ref 11.5–14.5)
FERRITIN SERPL-MCNC: 171 NG/ML (ref 20–300)
HCT VFR BLD AUTO: 31.6 % (ref 37–48.5)
HGB BLD-MCNC: 9.7 G/DL (ref 12–16)
IMM GRANULOCYTES # BLD AUTO: 0.01 K/UL (ref 0–0.04)
IMM GRANULOCYTES NFR BLD AUTO: 0.2 % (ref 0–0.5)
IRON SERPL-MCNC: 33 UG/DL (ref 30–160)
LYMPHOCYTES # BLD AUTO: 1.3 K/UL (ref 1–4.8)
LYMPHOCYTES NFR BLD: 21.2 % (ref 18–48)
MCH RBC QN AUTO: 25.9 PG (ref 27–31)
MCHC RBC AUTO-ENTMCNC: 30.7 G/DL (ref 32–36)
MCV RBC AUTO: 84 FL (ref 82–98)
MONOCYTES # BLD AUTO: 0.4 K/UL (ref 0.3–1)
MONOCYTES NFR BLD: 6 % (ref 4–15)
NEUTROPHILS # BLD AUTO: 4.1 K/UL (ref 1.8–7.7)
NEUTROPHILS NFR BLD: 68.1 % (ref 38–73)
NRBC BLD-RTO: 0 /100 WBC
PLATELET # BLD AUTO: 247 K/UL (ref 150–450)
PMV BLD AUTO: 10.4 FL (ref 9.2–12.9)
RBC # BLD AUTO: 3.75 M/UL (ref 4–5.4)
SATURATED IRON: 14 % (ref 20–50)
TOTAL IRON BINDING CAPACITY: 241 UG/DL (ref 250–450)
TRANSFERRIN SERPL-MCNC: 163 MG/DL (ref 200–375)
TRANSFERRIN SERPL-MCNC: 163 MG/DL (ref 200–375)
WBC # BLD AUTO: 6.04 K/UL (ref 3.9–12.7)

## 2023-06-08 PROCEDURE — 99999 PR PBB SHADOW E&M-EST. PATIENT-LVL IV: CPT | Mod: PBBFAC,,, | Performed by: INTERNAL MEDICINE

## 2023-06-08 PROCEDURE — 85025 COMPLETE CBC W/AUTO DIFF WBC: CPT | Performed by: INTERNAL MEDICINE

## 2023-06-08 PROCEDURE — 3074F PR MOST RECENT SYSTOLIC BLOOD PRESSURE < 130 MM HG: ICD-10-PCS | Mod: CPTII,S$GLB,, | Performed by: INTERNAL MEDICINE

## 2023-06-08 PROCEDURE — 1159F MED LIST DOCD IN RCRD: CPT | Mod: CPTII,S$GLB,, | Performed by: INTERNAL MEDICINE

## 2023-06-08 PROCEDURE — 3078F DIAST BP <80 MM HG: CPT | Mod: CPTII,S$GLB,, | Performed by: INTERNAL MEDICINE

## 2023-06-08 PROCEDURE — 1126F AMNT PAIN NOTED NONE PRSNT: CPT | Mod: CPTII,S$GLB,, | Performed by: INTERNAL MEDICINE

## 2023-06-08 PROCEDURE — 3074F SYST BP LT 130 MM HG: CPT | Mod: CPTII,S$GLB,, | Performed by: INTERNAL MEDICINE

## 2023-06-08 PROCEDURE — 1126F PR PAIN SEVERITY QUANTIFIED, NO PAIN PRESENT: ICD-10-PCS | Mod: CPTII,S$GLB,, | Performed by: INTERNAL MEDICINE

## 2023-06-08 PROCEDURE — 1111F PR DISCHARGE MEDS RECONCILED W/ CURRENT OUTPATIENT MED LIST: ICD-10-PCS | Mod: CPTII,S$GLB,, | Performed by: INTERNAL MEDICINE

## 2023-06-08 PROCEDURE — 36415 COLL VENOUS BLD VENIPUNCTURE: CPT | Performed by: INTERNAL MEDICINE

## 2023-06-08 PROCEDURE — 1111F DSCHRG MED/CURRENT MED MERGE: CPT | Mod: CPTII,S$GLB,, | Performed by: INTERNAL MEDICINE

## 2023-06-08 PROCEDURE — 84466 ASSAY OF TRANSFERRIN: CPT | Performed by: INTERNAL MEDICINE

## 2023-06-08 PROCEDURE — 99204 OFFICE O/P NEW MOD 45 MIN: CPT | Mod: S$GLB,,, | Performed by: INTERNAL MEDICINE

## 2023-06-08 PROCEDURE — 99999 PR PBB SHADOW E&M-EST. PATIENT-LVL IV: ICD-10-PCS | Mod: PBBFAC,,, | Performed by: INTERNAL MEDICINE

## 2023-06-08 PROCEDURE — 82728 ASSAY OF FERRITIN: CPT | Performed by: INTERNAL MEDICINE

## 2023-06-08 PROCEDURE — 3078F PR MOST RECENT DIASTOLIC BLOOD PRESSURE < 80 MM HG: ICD-10-PCS | Mod: CPTII,S$GLB,, | Performed by: INTERNAL MEDICINE

## 2023-06-08 PROCEDURE — 1159F PR MEDICATION LIST DOCUMENTED IN MEDICAL RECORD: ICD-10-PCS | Mod: CPTII,S$GLB,, | Performed by: INTERNAL MEDICINE

## 2023-06-08 PROCEDURE — 99204 PR OFFICE/OUTPT VISIT, NEW, LEVL IV, 45-59 MIN: ICD-10-PCS | Mod: S$GLB,,, | Performed by: INTERNAL MEDICINE

## 2023-06-08 NOTE — PROGRESS NOTES
Benign Hematology Clinic at [facility]     Chief Complaint:   Encounter Diagnosis   Name Primary?    Anemia in chronic kidney disease, unspecified CKD stage            HPI:  Nelsy Marino is a 82 y.o. female who presents today for evaluation of anemia.     Hematology History  Patient referred to hematology 2023 for anemia  Admitted to the hospital May 2023 with hypokalemia and found to have GIB  EGD/colonoscopy on 23 showed AVM and non-bleeding gastric ulcer on endoscopy and hemorrage in the ascending colon on colonoscopy  She received pRBC on 23 and 23    Review of iron panel from 2023 demonstrates undetectable iron but ferritin > 100      Social History     Tobacco Use    Smoking status: Former     Types: Cigarettes     Quit date: 1980     Years since quittin.4    Smokeless tobacco: Never   Substance Use Topics    Alcohol use: No    Drug use: No     Family History   Problem Relation Age of Onset    Diabetes Father     Hypertension Father      Past Medical History:   Diagnosis Date    CHF (congestive heart failure)     Diabetes mellitus     DVT (deep venous thrombosis)     Hypertension     Miscarriage      Past Surgical History:   Procedure Laterality Date     femoral vascular surgery       ANGIOGRAPHY OF LOWER EXTREMITY Right 2023    Procedure: Angiogram Extremity Unilateral;  Surgeon: Danny Dunbar MD;  Location: [facility] OR;  Service: Vascular;  Laterality: Right;  RN PHONE PREOP 2023----CK CONSENT    ANGIOGRAPHY OF LOWER EXTREMITY Right 2023    Procedure: Angiogram Extremity Unilateral - possible endovascular intervention;  Surgeon: René Simms MD;  Location: [facility] OR [address] [place] [address];  Service: Vascular;  Laterality: Right;  19.6 mins.  740.81 mGy  73.3399 Gy.cm  58ml dye  local - 1ml    COLONOSCOPY N/A 2023    Procedure: COLONOSCOPY;  Surgeon: Cuong Aguiar MD;  Location: [facility] ENDO;  Service: Endoscopy;  Laterality: N/A;     CREATION OF FEMORAL-FEMORAL ARTERY BYPASS WITH GRAFT Bilateral 3/2/2023    Procedure: CREATION, BYPASS, ARTERIAL, FEMORAL TO FEMORAL, USING GRAFT, LEFT FEMORAL ENDARTERECTOMY, LEFT ILIAC ARTERY STENT PLACEMENT, RIGHT LOWER EXTREMITY ANGIOGRAM AND RIGHT SFA  ANGIOPLASTY;  Surgeon: Danny Dunbar MD;  Location: [facility] OR 2ND FLR;  Service: Vascular;  Laterality: Bilateral;   744.47 MGY  175.36 Gycm  Flouro time 20.5 mins      CREATION OF ILIOFEMORAL ARTERY BYPASS Right 3/20/2019    Procedure: CREATION, BYPASS, ARTERIAL, ILIAC TO FEMORAL, RIGHT LOWER EXTREMITY, RIGHT PROFUNDAPLASTY;  Surgeon: Danny Dunbar MD;  Location: [facility];  Service: Vascular;  Laterality: Right;  11:00AM START PER NAZANIN RICHARDS PREOP 3/13/2019  ACT'S, CELL SAVER, GRAFTS, BOOK WATER---NEED H/P  CONSENT IN AM------HGBA1C    CREATION OF MUSCLE ROTATIONAL FLAP Left 3/31/2023    Procedure: CREATION, FLAP, MUSCLE ROTATION;  Surgeon: Brandon Smiley MD;  [facility];  Service: Plastics;  Laterality: Left;    ENDOSCOPY OF PROXIMAL SMALL INTESTINE N/A 5/18/2023    Procedure: ENTEROSCOPY, PROXIMAL;  Surgeon: Cuong Aguiar MD;  Location: [facility];  Service: Endoscopy;  Laterality: N/A;    ESOPHAGOGASTRODUODENOSCOPY N/A 3/17/2023    Procedure: EGD (ESOPHAGOGASTRODUODENOSCOPY);  Surgeon: Gee Rodriguez MD;  [facility]  Service: Endoscopy;  Laterality: N/A;    ESOPHAGOGASTRODUODENOSCOPY N/A 4/11/2023    Procedure: EGD (ESOPHAGOGASTRODUODENOSCOPY);  Surgeon: Nazanin Kaiser MD;  Location: [facility] ENDO (2ND FLR);  Service: Endoscopy;  Laterality: N/A;    ESOPHAGOGASTRODUODENOSCOPY N/A 4/26/2023    Procedure: EGD (ESOPHAGOGASTRODUODENOSCOPY);  Surgeon: Gee Rodriguez MD;  [facility]  Service: Endoscopy;  Laterality: N/A;    ESOPHAGOGASTRODUODENOSCOPY N/A 5/2/2023    Procedure: EGD (ESOPHAGOGASTRODUODENOSCOPY);  Surgeon: Cuong Conteh MD;  Location: [facility] ([address] [place] [address]);   Service: Endoscopy;  Laterality: N/A;    FOOT AMPUTATION THROUGH METATARSAL Right 3/4/2023    Procedure: AMPUTATION, FOOT, TRANSMETATARSAL;  Surgeon: Benjamin Godfrey DPM;  Location: [facility] OR [address] [place] [address];  Service: Podiatry;  Laterality: Right;    HYSTERECTOMY      ?unsure cervix present    INCISION AND DRAINAGE Right 4/12/2019    Procedure: Incision and Drainage - R groin washout, possible muscle flap;  Surgeon: Danny Dunbar MD;  [facility];  Service: Vascular;  Laterality: Right;  groin washout    INCISION AND DRAINAGE OF GROIN Right 5/3/2019    Procedure: Incision and Drainage R groin;  Surgeon: Danny Dunbar MD;  Location: [facility] OR [address] [place] [address];  Service: Vascular;  Laterality: Right;    WOUND DEBRIDEMENT Left 3/31/2023    Procedure: DEBRIDEMENT, WOUND;  Surgeon: René Simms MD;  Location: [facility] OR [address] [place] [address];  Service: Vascular;  Laterality: Left;       Patient Active Problem List   Diagnosis    Lichen sclerosus et atrophicus    PAD (peripheral artery disease)    Essential hypertension    Type 2 diabetes mellitus with chronic kidney disease, with long-term current use of insulin    Diastolic CHF, chronic    Obesity (BMI 30-39.9)    Critical right lower limb ischemia    Acute hypoxemic respiratory failure    History of DVT (deep vein thrombosis)    Wound dehiscence    Fluid collection at surgical site    Cellulitis    Postoperative seroma of musculoskeletal structure after non-musculoskeletal procedure    Abscess of right groin    Critical limb ischemia with history of revascularization of same extremity    Sepsis    Hyponatremia    Vascular graft infection    S/P transmetatarsal amputation of foot, right    Leukocytosis    Acute encephalopathy    Bilious vomiting    Upper GI bleed    Diabetic foot infection    Melena    Eschar of foot    Acute on chronic anemia    LOLY (acute kidney injury)    Skin ulcer of right heel, limited to breakdown of skin    Debility    Vascular inflammation or  "infection    Wound infection    Somnolence    Moderate malnutrition    Hypokalemia    Hypernatremia    Vitamin D deficiency    Diarrhea of presumed infectious origin    Hypomagnesemia    Removal of staple    S/P femoral-femoral bypass surgery    Change or removal of drains    Edema of left foot       Current Outpatient Medications   Medication Instructions    acetaminophen (TYLENOL) 1,000 mg, Oral, Every 8 hours    amLODIPine (NORVASC) 10 mg, Oral, Daily    atorvastatin (LIPITOR) 40 mg, Oral, Nightly    BLOOD SUGAR DIAGNOSTIC (TRUE METRIX GLUCOSE TEST STRIP MISC) Misc.(Non-Drug; Combo Route)    carvediloL (COREG) 25 mg, Oral, 2 times daily    cephALEXin (KEFLEX) 500 mg, Oral, Every 12 hours    diphenhydrAMINE (BENADRYL) 25 mg, Oral, Daily PRN    DULoxetine (CYMBALTA) 30 mg, Oral, 2 times daily    fluconazole (DIFLUCAN) 400 mg, Oral, Daily    hydrALAZINE (APRESOLINE) 75 mg, Oral, Every 8 hours    insulin syringe-needle,dispos. 0.3 mL 30 gauge x 5/16" Syrg Misc.(Non-Drug; Combo Route)    LEVEMIR FLEXPEN 5 Units, Subcutaneous, Nightly    oxyCODONE (ROXICODONE) 5 mg, Oral, Every 12 hours PRN    pantoprazole (PROTONIX) 40 mg, Oral, Daily    pen needle, diabetic 32 gauge x 5/32" Ndle 1 each, Subcutaneous, 4 times daily    vitamin D (VITAMIN D3) 1,000 Units, Oral, Daily       Review of Systems:   Review of Systems   Constitutional: Negative.    HENT: Negative.     Respiratory: Negative.     Cardiovascular: Negative.    Gastrointestinal: Negative.    Musculoskeletal: Negative.    Neurological: Negative.    Endo/Heme/Allergies: Negative.    All other systems reviewed and are negative.    PHYSICAL EXAM:  BP (!) 116/57   Pulse 74   Temp 98.2 °F (36.8 °C) (Oral)   Resp 18   Ht 5' 5" (1.651 m)   LMP  (LMP Unknown)   SpO2 98%   BMI 24.95 kg/m²     General Appearance:    Alert, cooperative, no distress, appears stated age   Lungs:     Clear to auscultation bilaterally, respirations unlabored    Heart:    Regular rate and " rhythm, S1 and S2 normal   Abdomen:     Soft, non-tender, bowel sounds active, no masses, no organomegaly   Extremities:   Extremities normal, atraumatic, no cyanosis or edema   Pulses:   2+ and symmetric all extremities   Skin:   Skin color, texture, turgor normal, no rashes or lesions   Lymph nodes:   Cervical, supraclavicular, and axillary nodes normal           Pertinent Labs & Imaging:  Recent Labs   Lab Result Units 05/16/23  1410 05/17/23  0601 05/17/23  1737 05/19/23  0931 05/21/23  0516 05/22/23  0345   WBC K/uL 8.87 8.37  --   --   --  8.03   Hemoglobin g/dL 7.1* 6.2*   < > 8.0* 6.8* 9.6*   Hematocrit % 21.8* 20.0*  --   --   --  30.7*   Platelets K/uL 304 295  --   --   --  257    < > = values in this interval not displayed.     Recent Labs   Lab Result Units 05/20/23  0617 05/21/23  0516 05/22/23  0345 05/22/23  0346   Creatinine mg/dL 1.0 0.9 0.9 0.9   AST U/L 21 24  --  25   ALT U/L 10 12  --  12     No results for input(s): IRON, FERRITIN in the last 2160 hours.    Assessment & Plan:    1. Anemia in chronic kidney disease, unspecified CKD stage  - Ambulatory referral/consult to Hematology / Oncology    Full chart review of past labs and imaging, referring providers' notes, and consultants' reports.   Patient with anemia in the setting of GIB and chronic infection/ wound dehissance   Will plan to repeat CBC and iron panel today  Concern for JAMES vs ACID given chronic wound    Will call patient with results       Med Onc Chart Routing      Follow up with physician . TBD based on labs   Follow up with NICOLE    Infusion scheduling note    Injection scheduling note    Labs Ferritin, iron and TIBC and CBC   Scheduling:  Preferred lab:  Lab interval:  labs today   Imaging    Pharmacy appointment    Other referrals            MDM includes  :    - unknown prognosis for uncertain diagnosis   - Review of prior external notes from unique source  - Independent review and explanation of 3+ results from unique tests  -  Discussion of management and ordering 3+ unique tests  - Extensive discussion of treatment and management          Shani Andrade MD  06/08/2023

## 2023-06-13 ENCOUNTER — OFFICE VISIT (OUTPATIENT)
Dept: VASCULAR SURGERY | Facility: CLINIC | Age: 83
End: 2023-06-13
Payer: MEDICARE

## 2023-06-13 VITALS
WEIGHT: 149.94 LBS | SYSTOLIC BLOOD PRESSURE: 126 MMHG | DIASTOLIC BLOOD PRESSURE: 44 MMHG | BODY MASS INDEX: 24.95 KG/M2 | HEART RATE: 64 BPM

## 2023-06-13 DIAGNOSIS — R60.0 EDEMA OF LEFT FOOT: ICD-10-CM

## 2023-06-13 DIAGNOSIS — I70.229 CRITICAL LOWER LIMB ISCHEMIA: Primary | ICD-10-CM

## 2023-06-13 DIAGNOSIS — Z95.828 S/P FEMORAL-FEMORAL BYPASS SURGERY: ICD-10-CM

## 2023-06-13 PROCEDURE — 99215 OFFICE O/P EST HI 40 MIN: CPT | Mod: S$GLB,,, | Performed by: SURGERY

## 2023-06-13 PROCEDURE — 3288F FALL RISK ASSESSMENT DOCD: CPT | Mod: CPTII,S$GLB,, | Performed by: SURGERY

## 2023-06-13 PROCEDURE — 1111F PR DISCHARGE MEDS RECONCILED W/ CURRENT OUTPATIENT MED LIST: ICD-10-PCS | Mod: CPTII,S$GLB,, | Performed by: SURGERY

## 2023-06-13 PROCEDURE — 1125F PR PAIN SEVERITY QUANTIFIED, PAIN PRESENT: ICD-10-PCS | Mod: CPTII,S$GLB,, | Performed by: SURGERY

## 2023-06-13 PROCEDURE — 1160F PR REVIEW ALL MEDS BY PRESCRIBER/CLIN PHARMACIST DOCUMENTED: ICD-10-PCS | Mod: CPTII,S$GLB,, | Performed by: SURGERY

## 2023-06-13 PROCEDURE — 1159F MED LIST DOCD IN RCRD: CPT | Mod: CPTII,S$GLB,, | Performed by: SURGERY

## 2023-06-13 PROCEDURE — 1101F PR PT FALLS ASSESS DOC 0-1 FALLS W/OUT INJ PAST YR: ICD-10-PCS | Mod: CPTII,S$GLB,, | Performed by: SURGERY

## 2023-06-13 PROCEDURE — 1101F PT FALLS ASSESS-DOCD LE1/YR: CPT | Mod: CPTII,S$GLB,, | Performed by: SURGERY

## 2023-06-13 PROCEDURE — 99215 PR OFFICE/OUTPT VISIT, EST, LEVL V, 40-54 MIN: ICD-10-PCS | Mod: S$GLB,,, | Performed by: SURGERY

## 2023-06-13 PROCEDURE — 3074F PR MOST RECENT SYSTOLIC BLOOD PRESSURE < 130 MM HG: ICD-10-PCS | Mod: CPTII,S$GLB,, | Performed by: SURGERY

## 2023-06-13 PROCEDURE — 3078F DIAST BP <80 MM HG: CPT | Mod: CPTII,S$GLB,, | Performed by: SURGERY

## 2023-06-13 PROCEDURE — 99999 PR PBB SHADOW E&M-EST. PATIENT-LVL IV: ICD-10-PCS | Mod: PBBFAC,,, | Performed by: SURGERY

## 2023-06-13 PROCEDURE — 3078F PR MOST RECENT DIASTOLIC BLOOD PRESSURE < 80 MM HG: ICD-10-PCS | Mod: CPTII,S$GLB,, | Performed by: SURGERY

## 2023-06-13 PROCEDURE — 1159F PR MEDICATION LIST DOCUMENTED IN MEDICAL RECORD: ICD-10-PCS | Mod: CPTII,S$GLB,, | Performed by: SURGERY

## 2023-06-13 PROCEDURE — 99999 PR PBB SHADOW E&M-EST. PATIENT-LVL IV: CPT | Mod: PBBFAC,,, | Performed by: SURGERY

## 2023-06-13 PROCEDURE — 1160F RVW MEDS BY RX/DR IN RCRD: CPT | Mod: CPTII,S$GLB,, | Performed by: SURGERY

## 2023-06-13 PROCEDURE — 1111F DSCHRG MED/CURRENT MED MERGE: CPT | Mod: CPTII,S$GLB,, | Performed by: SURGERY

## 2023-06-13 PROCEDURE — 3288F PR FALLS RISK ASSESSMENT DOCUMENTED: ICD-10-PCS | Mod: CPTII,S$GLB,, | Performed by: SURGERY

## 2023-06-13 PROCEDURE — 1125F AMNT PAIN NOTED PAIN PRSNT: CPT | Mod: CPTII,S$GLB,, | Performed by: SURGERY

## 2023-06-13 PROCEDURE — 3074F SYST BP LT 130 MM HG: CPT | Mod: CPTII,S$GLB,, | Performed by: SURGERY

## 2023-06-13 RX ORDER — OXYCODONE AND ACETAMINOPHEN 5; 325 MG/1; MG/1
1 TABLET ORAL EVERY 8 HOURS PRN
Qty: 30 TABLET | Refills: 0 | Status: ON HOLD | OUTPATIENT
Start: 2023-06-13 | End: 2023-08-03 | Stop reason: HOSPADM

## 2023-06-13 NOTE — PROGRESS NOTES
Danny Dunbar MD [facility]                         06/13/2023    HPI:  Nelsy Marino is a 82 y.o. female with   Patient Active Problem List   Diagnosis    Lichen sclerosus et atrophicus    PAD (peripheral artery disease)    Essential hypertension    Type 2 diabetes mellitus with chronic kidney disease, with long-term current use of insulin    Diastolic CHF, chronic    Obesity (BMI 30-39.9)    Critical right lower limb ischemia    Acute hypoxemic respiratory failure    History of DVT (deep vein thrombosis)    Wound dehiscence    Fluid collection at surgical site    Cellulitis    Postoperative seroma of musculoskeletal structure after non-musculoskeletal procedure    Abscess of right groin    Critical limb ischemia with history of revascularization of same extremity    Sepsis    Hyponatremia    Vascular graft infection    S/P transmetatarsal amputation of foot, right    Leukocytosis    Acute encephalopathy    Bilious vomiting    Upper GI bleed    Diabetic foot infection    Melena    Eschar of foot    Acute on chronic anemia    LOLY (acute kidney injury)    Skin ulcer of right heel, limited to breakdown of skin    Debility    Vascular inflammation or infection    Wound infection    Somnolence    Moderate malnutrition    Hypokalemia    Hypernatremia    Vitamin D deficiency    Diarrhea of presumed infectious origin    Hypomagnesemia    Removal of staple    S/P femoral-femoral bypass surgery    Change or removal of drains    Edema of left foot    being managed by PCP and specialists who is here today for evaluation of RLE pain.  She is s/p fem-fem approx 20 yrs ago without issues until recently.  Fem-fem that was performed for RLE ALI was noted to be occluded on US at [facility].  Was scheduled for an angiogram at [facility] although was unable to proceed due to insurance purposes.  Patient states location is R calf and thigh occurring for 2 mo.  Associated signs and  symptoms include none.  Quality is throbbing and severity is 9/10 at worst.  Symptoms began 2 mo ago.  Alleviating factors include rest.  Worsening factors include ambulation.  Pt states RLE claudication x 1 block.  States it is lifestyle limiting.     no MI  no Stroke  Tobacco use: quit 35 yrs ago    1/14/19: cont to c/o RLE pain, 1/2 block calf claudication.  Intermittent rest pain, no tissue loss.  Compliant with Pletal, has not noticed significant improvement.    2/14/19: Cont to experience 1/2 block R calf claudication.  No wounds.      4/8/19: s/p R iliofemoral bypass 3/20/19.  C/o R groin inc dehiscence by LUCIUS RN.  No F/C.  States RLE ischemic rest pain resolved, +ambulating better without issues.    4/11/19: Presents for further eval of R abundio inc dehiscence.  No F/C.    4/29/19: No new issues, states took all of PO Abx earlier than rec duration of 2 weeks by ID, no F/C.  States wound healing although has not used soap.  No RLE claudication or rest pain, no wounds to R foot.  States RLE pain resolved.    5/9/19:  On PO Abx.  Denies F/C.  Keeping clean with soap and water now.    5/16/19:  Doing well, keeping inc clean/dry, no complaints, no F/C.  Remains on Abx per ID recs.    5/23/19: No new issues, no F/C.  Remains on Abx.    6/6/19:  No new issues or complaints.  No F/C.  R foot paresthesias resolved.    7/2019:  Occasionally paresthesias R foot.  No claudication.  +ambulation.  No F/C.    10/2019:  No complaints today.  No signs of infection, no claudication.  Ambulating well.  Has resumed daily activities.    01/20/2021:  Patient without complaints today.  Ambulating without claudication.  Wounds have healed.  She is compliant with antibiotics and all of her other medications.    1/2023:  c/o poorly healing R great toenail s/p ingrown toenail surgery.  [facility] has stopped her Abx and ASA.  C/o R foot pain.    2/2023:  CTA complete.  Wound care HH RN.  C/o R foot pain.      3/2023:  s/p L fem-R SFA  bypass with LLE PTA 3/2/23.  Presents with c/o LLE pain.      6/2023:  c/o severe LLE pain.  Working with PT with severe weakness.  No RLE complaints.    Past Medical History:   Diagnosis Date    CHF (congestive heart failure)     Diabetes mellitus     DVT (deep venous thrombosis)     Hypertension     Miscarriage      Past Surgical History:   Procedure Laterality Date     femoral vascular surgery       ANGIOGRAPHY OF LOWER EXTREMITY Right 2/23/2023    Procedure: Angiogram Extremity Unilateral;  Surgeon: Danny Dunbar MD;  Location: [facility] OR;  Service: Vascular;  Laterality: Right;  RN PHONE PREOP 2/20/2023----CK CONSENT    ANGIOGRAPHY OF LOWER EXTREMITY Right 4/4/2023    Procedure: Angiogram Extremity Unilateral - possible endovascular intervention;  Surgeon: René Simms MD;  Location: [facility] OR [address] [place] [address];  Service: Vascular;  Laterality: Right;  19.6 mins.  740.81 mGy  73.3399 Gy.cm  58ml dye  local - 1ml    COLONOSCOPY N/A 5/18/2023    Procedure: COLONOSCOPY;  Surgeon: Cuong Aguiar MD;  Location: [facility] ENDO;  Service: Endoscopy;  Laterality: N/A;    CREATION OF FEMORAL-FEMORAL ARTERY BYPASS WITH GRAFT Bilateral 3/2/2023    Procedure: CREATION, BYPASS, ARTERIAL, FEMORAL TO FEMORAL, USING GRAFT, LEFT FEMORAL ENDARTERECTOMY, LEFT ILIAC ARTERY STENT PLACEMENT, RIGHT LOWER EXTREMITY ANGIOGRAM AND RIGHT SFA  ANGIOPLASTY;  Surgeon: Danny Dunbar MD;  Location: [facility] OR [address] [place] [address];  Service: Vascular;  Laterality: Bilateral;   744.47 MGY  175.36 Gycm  Flouro time 20.5 mins      CREATION OF ILIOFEMORAL ARTERY BYPASS Right 3/20/2019    Procedure: CREATION, BYPASS, ARTERIAL, ILIAC TO FEMORAL, RIGHT LOWER EXTREMITY, RIGHT PROFUNDAPLASTY;  Surgeon: Danny Dunbar MD;  Location: [facility] OR;  Service: Vascular;  Laterality: Right;  11:00AM START PER ANNE RICHARDS PREOP 3/13/2019  ACT'S, CELL SAVER, GRAFTS, BOOK WATER---NEED H/P  CONSENT IN AM------HGBA1C    CREATION OF MUSCLE ROTATIONAL FLAP Left 3/31/2023     Procedure: CREATION, FLAP, MUSCLE ROTATION;  Surgeon: Brandon Smiley MD;  Location: [facility] OR 2ND FLR;  Service: Plastics;  Laterality: Left;    ENDOSCOPY OF PROXIMAL SMALL INTESTINE N/A 5/18/2023    Procedure: ENTEROSCOPY, PROXIMAL;  Surgeon: Cuong Aguiar MD;  Location: [facility] ENDO;  Service: Endoscopy;  Laterality: N/A;    ESOPHAGOGASTRODUODENOSCOPY N/A 3/17/2023    Procedure: EGD (ESOPHAGOGASTRODUODENOSCOPY);  Surgeon: Gee Rodriguez MD;  Location: [facility] ENDO (2ND FLR);  Service: Endoscopy;  Laterality: N/A;    ESOPHAGOGASTRODUODENOSCOPY N/A 4/11/2023    Procedure: EGD (ESOPHAGOGASTRODUODENOSCOPY);  Surgeon: Nazanin Kaiser MD;  Location: [facility] ENDO (2ND FLR);  Service: Endoscopy;  Laterality: N/A;    ESOPHAGOGASTRODUODENOSCOPY N/A 4/26/2023    Procedure: EGD (ESOPHAGOGASTRODUODENOSCOPY);  Surgeon: Gee Rodriguez MD;  Location: [facility] (2ND FLR);  Service: Endoscopy;  Laterality: N/A;    ESOPHAGOGASTRODUODENOSCOPY N/A 5/2/2023    Procedure: EGD (ESOPHAGOGASTRODUODENOSCOPY);  Surgeon: Cuong Conteh MD;  Location: [facility] ENDO (2ND FLR);  Service: Endoscopy;  Laterality: N/A;    FOOT AMPUTATION THROUGH METATARSAL Right 3/4/2023    Procedure: AMPUTATION, FOOT, TRANSMETATARSAL;  Surgeon: Benjamin Godfrey DPM;  [facility];  Service: Podiatry;  Laterality: Right;    HYSTERECTOMY      ?unsure cervix present    INCISION AND DRAINAGE Right 4/12/2019    Procedure: Incision and Drainage - R groin washout, possible muscle flap;  Surgeon: Danny Dunbar MD;  [facility];  Service: Vascular;  Laterality: Right;  groin washout    INCISION AND DRAINAGE OF GROIN Right 5/3/2019    Procedure: Incision and Drainage R groin;  Surgeon: Danny Dunbar MD;  [facility];  Service: Vascular;  Laterality: Right;    WOUND DEBRIDEMENT Left 3/31/2023    Procedure: DEBRIDEMENT, WOUND;  Surgeon: René Simms MD;  Location: [facility] OR [address] [place] [address];  Service: Vascular;   Laterality: Left;     Family History   Problem Relation Age of Onset    Diabetes Father     Hypertension Father      Social History     Socioeconomic History    Marital status:    Tobacco Use    Smoking status: Former     Types: Cigarettes     Quit date:      Years since quittin.4    Smokeless tobacco: Never   Substance and Sexual Activity    Alcohol use: No    Drug use: No    Sexual activity: Never     Social Determinants of Health     Financial Resource Strain: Unknown    Difficulty of Paying Living Expenses: Patient refused   Food Insecurity: No Food Insecurity    Worried About Running Out of Food in the Last Year: Never true    Ran Out of Food in the Last Year: Never true   Transportation Needs: No Transportation Needs    Lack of Transportation (Medical): No    Lack of Transportation (Non-Medical): No   Physical Activity: Inactive    Days of Exercise per Week: 0 days    Minutes of Exercise per Session: 0 min   Stress: Unknown    Feeling of Stress : Patient refused   Social Connections: Unknown    Frequency of Communication with Friends and Family: More than three times a week    Frequency of Social Gatherings with Friends and Family: More than three times a week    Attends [employer] Services: Patient refused    Active Member of Clubs or Organizations: Patient refused    Attends Club or Organization Meetings: Patient refused    Marital Status:    Housing Stability: Low Risk     Unable to Pay for Housing in the Last Year: No    Number of Places Lived in the Last Year: 1    Unstable Housing in the Last Year: No       Current Outpatient Medications:     acetaminophen (TYLENOL) 500 MG tablet, Take 2 tablets (1,000 mg total) by mouth every 8 (eight) hours., Disp: , Rfl: 0    amLODIPine (NORVASC) 10 MG tablet, Take 1 tablet (10 mg total) by mouth once daily., Disp: 90 tablet, Rfl: 0    atorvastatin (LIPITOR) 40 MG tablet, Take 1 tablet (40 mg total) by mouth every evening., Disp: 30 tablet, Rfl:  "0    BLOOD SUGAR DIAGNOSTIC (TRUE METRIX GLUCOSE TEST STRIP MISC), by Misc.(Non-Drug; Combo Route) route., Disp: , Rfl:     carvediloL (COREG) 25 MG tablet, Take 1 tablet (25 mg total) by mouth 2 (two) times daily., Disp: 60 tablet, Rfl: 0    cephALEXin (KEFLEX) 500 MG capsule, Take 1 capsule (500 mg total) by mouth every 12 (twelve) hours., Disp: 60 capsule, Rfl: 11    diphenhydrAMINE (BENADRYL) 25 mg capsule, Take 25 mg by mouth daily as needed for Itching., Disp: , Rfl:     DULoxetine (CYMBALTA) 30 MG capsule, Take 1 capsule (30 mg total) by mouth 2 (two) times daily., Disp: 60 capsule, Rfl: 0    fluconazole (DIFLUCAN) 200 MG Tab, Take 2 tablets (400 mg total) by mouth once daily., Disp: 60 tablet, Rfl: 11    hydrALAZINE (APRESOLINE) 25 MG tablet, Take 3 tablets (75 mg total) by mouth every 8 (eight) hours., Disp: 270 tablet, Rfl: 11    insulin detemir U-100, Levemir, 100 unit/mL (3 mL) SubQ InPn pen, Inject 5 Units into the skin every evening., Disp: 6 mL, Rfl: 3    insulin syringe-needle,dispos. 0.3 mL 30 gauge x 5/16" Syrg, by Misc.(Non-Drug; Combo Route) route., Disp: , Rfl:     oxyCODONE (ROXICODONE) 5 MG immediate release tablet, Take 1 tablet (5 mg total) by mouth every 12 (twelve) hours as needed., Disp: 14 tablet, Rfl: 0    pantoprazole (PROTONIX) 40 MG tablet, Take 1 tablet (40 mg total) by mouth once daily., Disp: 90 tablet, Rfl: 0    pen needle, diabetic 32 gauge x 5/32" Ndle, Inject 1 each into the skin 4 (four) times daily., Disp: 100 each, Rfl: 0    vitamin D (VITAMIN D3) 1000 units Tab, Take 1 tablet (1,000 Units total) by mouth once daily., Disp: , Rfl:     REVIEW OF SYSTEMS:  General: No fevers or chills; ENT: No sore throat; Allergy and Immunology: no persistent infections; Hematological and Lymphatic: No history of bleeding or easy bruising; Endocrine: negative; Respiratory: no cough, shortness of breath, or wheezing; Cardiovascular: no chest pain or dyspnea on exertion; Gastrointestinal: no " abdominal pain/back, change in bowel habits, or bloody stools; Genito-Urinary: no dysuria, trouble voiding, or hematuria; Musculoskeletal: negative; Neurological: no TIA or stroke symptoms; Psychiatric: no nervousness, anxiety or depression.    PHYSICAL EXAM:      Pulse: 64         General appearance:  Alert, well-appearing, and in no distress.  Oriented to person, place, and time                    Neurological: Normal speech, no focal findings noted; CN II - XII grossly intact. RLE with sensation to light touch, LLE with sensation to light touch.            Musculoskeletal: Digits/nail without cyanosis/clubbing.  Strength 5/5 BLE.                    Neck: Supple, no significant adenopathy                  Chest:  Clear to auscultation, no wheezes, rales or rhonchi, symmetric air entry. No use of accessory muscles               Cardiac: Normal rate and regular rhythm, S1 and S2 normal            Abdomen: Soft, nontender, nondistended, no masses or organomegaly, no hernia     No rebound tenderness noted; bowel sounds normal     R groin inc healed, no erythema      Extremities:   Unable to palpate R femoral pulse, 2+ L femoral pulse     doppler+ R popliteal pulse, doppler+ L popliteal pulse     doppler+ R PT pulse, doppler+ L PT pulse     2+ R DP pulse, doppler+ L DP pulse     no RLE edema, no LLE edema    Skin: R TMA with eschar to skin edge, no erythema; LLE with dry toe tissue loss, L groin wound healing without local signs of infection    LAB RESULTS:  No results found for: CBC  Lab Results   Component Value Date    LABPROT 12.1 04/04/2023    INR 1.2 04/04/2023     Lab Results   Component Value Date     05/22/2023    K 4.1 05/22/2023     05/22/2023    CO2 24 05/22/2023     (H) 05/22/2023    BUN 8 05/22/2023    CREATININE 0.9 05/22/2023    CALCIUM 8.5 (L) 05/22/2023    ANIONGAP 11 05/22/2023    EGFRNONAA 48 (A) 10/18/2019     Lab Results   Component Value Date    WBC 6.04 06/08/2023    RBC 3.75  (L) 06/08/2023    HGB 9.7 (L) 06/08/2023    HCT 31.6 (L) 06/08/2023    MCV 84 06/08/2023    MCH 25.9 (L) 06/08/2023    MCHC 30.7 (L) 06/08/2023    RDW 14.3 06/08/2023     06/08/2023    MPV 10.4 06/08/2023    GRAN 4.1 06/08/2023    GRAN 68.1 06/08/2023    LYMPH 1.3 06/08/2023    LYMPH 21.2 06/08/2023    MONO 0.4 06/08/2023    MONO 6.0 06/08/2023    EOS 0.3 06/08/2023    BASO 0.01 06/08/2023    EOSINOPHIL 4.3 06/08/2023    BASOPHIL 0.2 06/08/2023    DIFFMETHOD Automated 06/08/2023     .  Lab Results   Component Value Date    HGBA1C 8.2 (H) 02/28/2023       IMAGING:  All pertinent imaging has been reviewed and interpreted independently.    11/8/18: US showing occluded fem-fem, decreased velocity R CFA, HD significant stenosis R SFA    [facility] ARCHIE: 0.4/1    1/14/19: ARCHIE R 0.4/1.1, flat L toe waveform, R toe pressure 64 mmHg    2/2019: Stress test without evidence of ischemia.  Echo with normal function.    4/24/19:   ARCHIE   1. Right lower extremity pressures and waveforms indicate mild arterial occlusive disease.  2. Left lower extremity pressures and waveforms indicate no hemodynamically significant arterial occlusive disease.    BLE arterial US  1. Right lower extremity arterial ultrasound and pressures indicate no hemodynamically significant stenosis.  The right iliac artery to femoral artery bypass is patent without evidence of hemodynamically significant stenosis.    2. Left lower extremity arterial ultrasound shows an occluded left external iliac artery.  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    CTA runoff 4/24/2019:  Patient is postop right iliac to femoral artery bypass.  There is some soft tissue stranding along the iliac graft similar to slightly increased compared to prior.  May be some narrowing at the origin of the graft.  Dilatation of the insertion may be related to surgical technique or pseudoaneurysm.  Correlation with operative procedure will be helpful.  There is  decrease in the volume of fluid though a focal 1 cm collection does persist.  There is some increased soft tissue thickening about the insertion site as well as the skin.  Skin staples remain.  Occluded fem-fem bypass graft.  Significant vascular disease throughout the SFA and calf vessels bilaterally similar.  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  Cholelithiasis.    CTA 6/24/19:  Patent right iliac to femoral bypass graft and occluded femoral-femoral bypass graft.  Mild aneurysmal dilatation at the distal anastomosis, unchanged.  Patent lower extremity arterial system with scattered moderate/ high-grade stenosis throughout, as above.    Arterial US 6/24/19:  1. Right lower extremity arterial ultrasound shows a patent iliac to femoral artery bypass with distal anastomosis hemodynamically significant stenosis.  There is mid SFA stenosis.  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  Pressures indicate mild arterial occlusive disease.    ARCHIE 6/24/19:  1. Right lower extremity pressures and waveforms indicate mild arterial occlusive disease.  2. Left lower extremity pressures and waveforms indicate no hemodynamically significant arterial occlusive disease.  Ankle pressures are non-compressible, suggesting possible medial calcinosis.    BLE arterial US 10/2019:  1. Right lower extremity arterial ultrasound shows no hemodynamically significant stenosis in the iliofemoral bypass.  There is hemodynamically significant stenosis in the mid SFA.  Right femoral anastomosis measures 1.7 x 1.6 cm.  Pressures indicate mild arterial occlusive disease.  2. Left lower extremity arterial ultrasound shows an occluded femoral-femoral bypass. Pressures indicate mild arterial occlusive disease.    11/2020:  1. Right lower extremity arterial ultrasound shows a patent right iliac to femoral bypass graft with approximately 50% stenosis of the distal anastomosis.  There  is hemodynamically significant stenosis of the mid SFA.  Pressures indicate no arterial occlusive disease.  The femoral anastomosis measures 1.8 x 1.6 x 1.7 cm.    CTA 2/2023:  R iliofemoral bypass occluded.    4/2023  Duplex imaging reveals a patent Lt. to Rt. Fem-Fem bypass graft. Graft velocities do not suggest a hemodynamically significant   stenosis.     IMP/PLAN:  82 y.o. female with   Patient Active Problem List   Diagnosis    Lichen sclerosus et atrophicus    PAD (peripheral artery disease)    Essential hypertension    Type 2 diabetes mellitus with chronic kidney disease, with long-term current use of insulin    Diastolic CHF, chronic    Obesity (BMI 30-39.9)    Critical right lower limb ischemia    Acute hypoxemic respiratory failure    History of DVT (deep vein thrombosis)    Wound dehiscence    Fluid collection at surgical site    Cellulitis    Postoperative seroma of musculoskeletal structure after non-musculoskeletal procedure    Abscess of right groin    Critical limb ischemia with history of revascularization of same extremity    Sepsis    Hyponatremia    Vascular graft infection    S/P transmetatarsal amputation of foot, right    Leukocytosis    Acute encephalopathy    Bilious vomiting    Upper GI bleed    Diabetic foot infection    Melena    Eschar of foot    Acute on chronic anemia    LOLY (acute kidney injury)    Skin ulcer of right heel, limited to breakdown of skin    Debility    Vascular inflammation or infection    Wound infection    Somnolence    Moderate malnutrition    Hypokalemia    Hypernatremia    Vitamin D deficiency    Diarrhea of presumed infectious origin    Hypomagnesemia    Removal of staple    S/P femoral-femoral bypass surgery    Change or removal of drains    Edema of left foot    being managed by PCP and specialists who is here today for evaluation of RLE claudication, lifestyle limiting s/p R iliofemoral bypass 3/2019 with groin incision dehisence and superficial infection /  deep seroma with graft occlusion on CTA 2/2023, symptoms of graft occlusion 2022    -Recovering well s/p R iliofemoral bypass s/p wound washout and drainage of seroma with healing skin dehiscence of R groin inc at superior and mid aspect, no graft exposure or fascial dehiscence, 2+ DP with resolution of claudication and ischemic rest pain, now with healing wounds s/p R groin I&D of distal inc superficial skin infection and removal of embedded staple with improvement in wound and no further infection with wound healed now and graft patent with ARCHIE 0.7/0.9 with resolved claudication/rest pain with decreased R femoral pulse and concern for bypass occlusion with poorly healing R great toe s/p L-R CFA to SFA bypass and RLE PTA 3/2/23 with L femoral incision dehiscence and LLE pain - BLE arterial US, ARCHIE/TP, Pain referral and Podiatry referral  -ASA, Plavix and anticoagulation held due to GI bleed  -Continue heart healthy lifestyle  -Cont Abx per ID recs for suppressive therapy   -Cont smoking cessation  -RTC after studies    I spent 11 minutes evaluating this patient and greater than 50% of the time was spent counseling, coordinator care and discussing the plan of care.  All questions were answered and patient stated understanding with agreement with the above treatment plan.    Danny Dunbar MD [facility]  Vascular and Endovascular Surgery

## 2023-06-19 PROBLEM — J96.01 ACUTE HYPOXEMIC RESPIRATORY FAILURE: Status: RESOLVED | Noted: 2019-03-20 | Resolved: 2023-06-19

## 2023-06-19 PROBLEM — A41.9 SEPSIS: Status: RESOLVED | Noted: 2023-02-27 | Resolved: 2023-06-19

## 2023-06-19 PROBLEM — K92.2 UPPER GI BLEED: Status: RESOLVED | Noted: 2023-03-16 | Resolved: 2023-06-19

## 2023-06-20 ENCOUNTER — HOSPITAL ENCOUNTER (INPATIENT)
Facility: HOSPITAL | Age: 83
LOS: 4 days | Discharge: HOME HEALTH | DRG: 291 | End: 2023-06-26
Attending: EMERGENCY MEDICINE | Admitting: EMERGENCY MEDICINE
Payer: MEDICARE

## 2023-06-20 DIAGNOSIS — J81.0 ACUTE PULMONARY EDEMA: ICD-10-CM

## 2023-06-20 DIAGNOSIS — I50.9 CONGESTIVE HEART FAILURE, UNSPECIFIED HF CHRONICITY, UNSPECIFIED HEART FAILURE TYPE: Primary | ICD-10-CM

## 2023-06-20 DIAGNOSIS — R09.02 HYPOXIA: ICD-10-CM

## 2023-06-20 DIAGNOSIS — R06.02 SHORTNESS OF BREATH: ICD-10-CM

## 2023-06-20 PROBLEM — I50.43 ACUTE ON CHRONIC COMBINED SYSTOLIC AND DIASTOLIC HEART FAILURE: Status: ACTIVE | Noted: 2023-06-20

## 2023-06-20 PROBLEM — I50.33 ACUTE ON CHRONIC DIASTOLIC HEART FAILURE: Status: ACTIVE | Noted: 2023-06-20

## 2023-06-20 LAB
ALBUMIN SERPL BCP-MCNC: 2.5 G/DL (ref 3.5–5.2)
ALP SERPL-CCNC: 83 U/L (ref 55–135)
ALT SERPL W/O P-5'-P-CCNC: 9 U/L (ref 10–44)
ANION GAP SERPL CALC-SCNC: 12 MMOL/L (ref 8–16)
AST SERPL-CCNC: 23 U/L (ref 10–40)
BASOPHILS # BLD AUTO: 0.02 K/UL (ref 0–0.2)
BASOPHILS NFR BLD: 0.2 % (ref 0–1.9)
BILIRUB SERPL-MCNC: 0.5 MG/DL (ref 0.1–1)
BNP SERPL-MCNC: 906 PG/ML (ref 0–99)
BUN SERPL-MCNC: 32 MG/DL (ref 8–23)
CALCIUM SERPL-MCNC: 8.8 MG/DL (ref 8.7–10.5)
CHLORIDE SERPL-SCNC: 100 MMOL/L (ref 95–110)
CO2 SERPL-SCNC: 27 MMOL/L (ref 23–29)
CREAT SERPL-MCNC: 1.8 MG/DL (ref 0.5–1.4)
CTP QC/QA: YES
CTP QC/QA: YES
DIFFERENTIAL METHOD: ABNORMAL
EOSINOPHIL # BLD AUTO: 0.2 K/UL (ref 0–0.5)
EOSINOPHIL NFR BLD: 2 % (ref 0–8)
ERYTHROCYTE [DISTWIDTH] IN BLOOD BY AUTOMATED COUNT: 14.6 % (ref 11.5–14.5)
EST. GFR  (NO RACE VARIABLE): 28 ML/MIN/1.73 M^2
GLUCOSE SERPL-MCNC: 168 MG/DL (ref 70–110)
HCT VFR BLD AUTO: 26 % (ref 37–48.5)
HGB BLD-MCNC: 8.3 G/DL (ref 12–16)
IMM GRANULOCYTES # BLD AUTO: 0.03 K/UL (ref 0–0.04)
IMM GRANULOCYTES NFR BLD AUTO: 0.3 % (ref 0–0.5)
INR PPP: 1.2 (ref 0.8–1.2)
LACTATE SERPL-SCNC: 1.1 MMOL/L (ref 0.5–2.2)
LYMPHOCYTES # BLD AUTO: 1.1 K/UL (ref 1–4.8)
LYMPHOCYTES NFR BLD: 11.3 % (ref 18–48)
MAGNESIUM SERPL-MCNC: 1.9 MG/DL (ref 1.6–2.6)
MCH RBC QN AUTO: 25.6 PG (ref 27–31)
MCHC RBC AUTO-ENTMCNC: 31.9 G/DL (ref 32–36)
MCV RBC AUTO: 80 FL (ref 82–98)
MONOCYTES # BLD AUTO: 0.5 K/UL (ref 0.3–1)
MONOCYTES NFR BLD: 5.4 % (ref 4–15)
NEUTROPHILS # BLD AUTO: 7.9 K/UL (ref 1.8–7.7)
NEUTROPHILS NFR BLD: 80.8 % (ref 38–73)
NRBC BLD-RTO: 0 /100 WBC
PLATELET # BLD AUTO: 350 K/UL (ref 150–450)
PMV BLD AUTO: 10.2 FL (ref 9.2–12.9)
POC MOLECULAR INFLUENZA A AGN: NEGATIVE
POC MOLECULAR INFLUENZA B AGN: NEGATIVE
POCT GLUCOSE: 147 MG/DL (ref 70–110)
POCT GLUCOSE: 159 MG/DL (ref 70–110)
POCT GLUCOSE: 164 MG/DL (ref 70–110)
POTASSIUM SERPL-SCNC: 3.2 MMOL/L (ref 3.5–5.1)
PROCALCITONIN SERPL IA-MCNC: 0.35 NG/ML
PROT SERPL-MCNC: 6.9 G/DL (ref 6–8.4)
PROTHROMBIN TIME: 12.2 SEC (ref 9–12.5)
RBC # BLD AUTO: 3.24 M/UL (ref 4–5.4)
SARS-COV-2 RDRP RESP QL NAA+PROBE: NEGATIVE
SODIUM SERPL-SCNC: 139 MMOL/L (ref 136–145)
TROPONIN I SERPL DL<=0.01 NG/ML-MCNC: 0.01 NG/ML (ref 0–0.03)
TROPONIN I SERPL DL<=0.01 NG/ML-MCNC: 0.01 NG/ML (ref 0–0.03)
WBC # BLD AUTO: 9.78 K/UL (ref 3.9–12.7)

## 2023-06-20 PROCEDURE — G0378 HOSPITAL OBSERVATION PER HR: HCPCS

## 2023-06-20 PROCEDURE — 25000003 PHARM REV CODE 250: Performed by: PHYSICIAN ASSISTANT

## 2023-06-20 PROCEDURE — 85025 COMPLETE CBC W/AUTO DIFF WBC: CPT | Performed by: EMERGENCY MEDICINE

## 2023-06-20 PROCEDURE — 63600175 PHARM REV CODE 636 W HCPCS: Performed by: EMERGENCY MEDICINE

## 2023-06-20 PROCEDURE — 36415 COLL VENOUS BLD VENIPUNCTURE: CPT | Performed by: PHYSICIAN ASSISTANT

## 2023-06-20 PROCEDURE — 93010 ELECTROCARDIOGRAM REPORT: CPT | Mod: ,,, | Performed by: INTERNAL MEDICINE

## 2023-06-20 PROCEDURE — 84484 ASSAY OF TROPONIN QUANT: CPT | Mod: 91 | Performed by: PHYSICIAN ASSISTANT

## 2023-06-20 PROCEDURE — 87040 BLOOD CULTURE FOR BACTERIA: CPT | Performed by: EMERGENCY MEDICINE

## 2023-06-20 PROCEDURE — 93005 ELECTROCARDIOGRAM TRACING: CPT

## 2023-06-20 PROCEDURE — 83036 HEMOGLOBIN GLYCOSYLATED A1C: CPT | Performed by: PHYSICIAN ASSISTANT

## 2023-06-20 PROCEDURE — 93010 EKG 12-LEAD: ICD-10-PCS | Mod: ,,, | Performed by: INTERNAL MEDICINE

## 2023-06-20 PROCEDURE — 94760 N-INVAS EAR/PLS OXIMETRY 1: CPT

## 2023-06-20 PROCEDURE — 84484 ASSAY OF TROPONIN QUANT: CPT | Performed by: EMERGENCY MEDICINE

## 2023-06-20 PROCEDURE — 83880 ASSAY OF NATRIURETIC PEPTIDE: CPT | Performed by: EMERGENCY MEDICINE

## 2023-06-20 PROCEDURE — 94644 CONT INHLJ TX 1ST HOUR: CPT

## 2023-06-20 PROCEDURE — 27000221 HC OXYGEN, UP TO 24 HOURS

## 2023-06-20 PROCEDURE — 87502 INFLUENZA DNA AMP PROBE: CPT

## 2023-06-20 PROCEDURE — 87635 SARS-COV-2 COVID-19 AMP PRB: CPT | Performed by: EMERGENCY MEDICINE

## 2023-06-20 PROCEDURE — 83605 ASSAY OF LACTIC ACID: CPT | Performed by: EMERGENCY MEDICINE

## 2023-06-20 PROCEDURE — 85610 PROTHROMBIN TIME: CPT | Performed by: EMERGENCY MEDICINE

## 2023-06-20 PROCEDURE — 99291 CRITICAL CARE FIRST HOUR: CPT

## 2023-06-20 PROCEDURE — 80053 COMPREHEN METABOLIC PANEL: CPT | Performed by: EMERGENCY MEDICINE

## 2023-06-20 PROCEDURE — 84145 PROCALCITONIN (PCT): CPT | Performed by: EMERGENCY MEDICINE

## 2023-06-20 PROCEDURE — 83735 ASSAY OF MAGNESIUM: CPT | Performed by: PHYSICIAN ASSISTANT

## 2023-06-20 PROCEDURE — 25000242 PHARM REV CODE 250 ALT 637 W/ HCPCS: Performed by: EMERGENCY MEDICINE

## 2023-06-20 RX ORDER — FUROSEMIDE 10 MG/ML
40 INJECTION INTRAMUSCULAR; INTRAVENOUS
Status: COMPLETED | OUTPATIENT
Start: 2023-06-20 | End: 2023-06-20

## 2023-06-20 RX ORDER — HYDRALAZINE HYDROCHLORIDE 25 MG/1
75 TABLET, FILM COATED ORAL EVERY 8 HOURS
Status: DISCONTINUED | OUTPATIENT
Start: 2023-06-20 | End: 2023-06-20

## 2023-06-20 RX ORDER — INSULIN ASPART 100 [IU]/ML
0-5 INJECTION, SOLUTION INTRAVENOUS; SUBCUTANEOUS
Status: DISCONTINUED | OUTPATIENT
Start: 2023-06-20 | End: 2023-06-26 | Stop reason: HOSPADM

## 2023-06-20 RX ORDER — POTASSIUM CHLORIDE 750 MG/1
30 TABLET, EXTENDED RELEASE ORAL ONCE
Status: COMPLETED | OUTPATIENT
Start: 2023-06-20 | End: 2023-06-20

## 2023-06-20 RX ORDER — GLUCAGON 1 MG
1 KIT INJECTION
Status: DISCONTINUED | OUTPATIENT
Start: 2023-06-20 | End: 2023-06-26 | Stop reason: HOSPADM

## 2023-06-20 RX ORDER — AMOXICILLIN 250 MG
1 CAPSULE ORAL DAILY PRN
Status: DISCONTINUED | OUTPATIENT
Start: 2023-06-20 | End: 2023-06-26 | Stop reason: HOSPADM

## 2023-06-20 RX ORDER — CEPHALEXIN 500 MG/1
500 CAPSULE ORAL EVERY 12 HOURS
Status: DISCONTINUED | OUTPATIENT
Start: 2023-06-20 | End: 2023-06-26 | Stop reason: HOSPADM

## 2023-06-20 RX ORDER — ALBUTEROL SULFATE 2.5 MG/.5ML
10 SOLUTION RESPIRATORY (INHALATION)
Status: COMPLETED | OUTPATIENT
Start: 2023-06-20 | End: 2023-06-20

## 2023-06-20 RX ORDER — IBUPROFEN 200 MG
24 TABLET ORAL
Status: DISCONTINUED | OUTPATIENT
Start: 2023-06-20 | End: 2023-06-26 | Stop reason: HOSPADM

## 2023-06-20 RX ORDER — FUROSEMIDE 10 MG/ML
40 INJECTION INTRAMUSCULAR; INTRAVENOUS
Status: DISCONTINUED | OUTPATIENT
Start: 2023-06-21 | End: 2023-06-21

## 2023-06-20 RX ORDER — CARVEDILOL 12.5 MG/1
25 TABLET ORAL 2 TIMES DAILY
Status: DISCONTINUED | OUTPATIENT
Start: 2023-06-20 | End: 2023-06-20

## 2023-06-20 RX ORDER — SODIUM CHLORIDE 0.9 % (FLUSH) 0.9 %
10 SYRINGE (ML) INJECTION
Status: DISCONTINUED | OUTPATIENT
Start: 2023-06-20 | End: 2023-06-26 | Stop reason: HOSPADM

## 2023-06-20 RX ORDER — AMLODIPINE BESYLATE 5 MG/1
10 TABLET ORAL DAILY
Status: DISCONTINUED | OUTPATIENT
Start: 2023-06-21 | End: 2023-06-20

## 2023-06-20 RX ORDER — IBUPROFEN 200 MG
16 TABLET ORAL
Status: DISCONTINUED | OUTPATIENT
Start: 2023-06-20 | End: 2023-06-26 | Stop reason: HOSPADM

## 2023-06-20 RX ORDER — TALC
6 POWDER (GRAM) TOPICAL NIGHTLY PRN
Status: DISCONTINUED | OUTPATIENT
Start: 2023-06-20 | End: 2023-06-26 | Stop reason: HOSPADM

## 2023-06-20 RX ORDER — ACETAMINOPHEN 325 MG/1
650 TABLET ORAL EVERY 6 HOURS PRN
Status: DISCONTINUED | OUTPATIENT
Start: 2023-06-20 | End: 2023-06-26 | Stop reason: HOSPADM

## 2023-06-20 RX ORDER — ATORVASTATIN CALCIUM 40 MG/1
40 TABLET, FILM COATED ORAL NIGHTLY
Status: DISCONTINUED | OUTPATIENT
Start: 2023-06-20 | End: 2023-06-26 | Stop reason: HOSPADM

## 2023-06-20 RX ORDER — IPRATROPIUM BROMIDE 0.5 MG/2.5ML
1 SOLUTION RESPIRATORY (INHALATION)
Status: COMPLETED | OUTPATIENT
Start: 2023-06-20 | End: 2023-06-20

## 2023-06-20 RX ADMIN — ACETAMINOPHEN 650 MG: 325 TABLET ORAL at 08:06

## 2023-06-20 RX ADMIN — CEPHALEXIN 500 MG: 500 CAPSULE ORAL at 08:06

## 2023-06-20 RX ADMIN — ALBUTEROL SULFATE 10 MG: 2.5 SOLUTION RESPIRATORY (INHALATION) at 05:06

## 2023-06-20 RX ADMIN — ATORVASTATIN CALCIUM 40 MG: 40 TABLET, FILM COATED ORAL at 08:06

## 2023-06-20 RX ADMIN — FUROSEMIDE 40 MG: 10 INJECTION, SOLUTION INTRAMUSCULAR; INTRAVENOUS at 05:06

## 2023-06-20 RX ADMIN — IPRATROPIUM BROMIDE 1 MG: 0.5 SOLUTION RESPIRATORY (INHALATION) at 05:06

## 2023-06-20 RX ADMIN — POTASSIUM CHLORIDE 30 MEQ: 750 TABLET, EXTENDED RELEASE ORAL at 08:06

## 2023-06-20 NOTE — HPI
Nelsy Marino 82 y.o. female with HFpEF, HTN, PAD S/P Fem/fem bypass on chronic suppressive abx presents to the hospital with a chief complaint of SOB.  She reports she developed shortness of breath last night that she attempted treatment for at home her home oxygen without improvement.  After she began wearing her nasal cannula she developed epistaxis which has resolved without intervention.  She continues to feel short of breath.  She reports she is not on a diuretic outpatient.  She and her  deny any salty food intake.  She denies fever chest pain nausea vomiting abdominal pain melena hematuria hematemesis productive and sick contacts.    In the ED, hypoxic to 87% on room air chest x-ray with pulmonary edema and bilateral pleural effusions BNP at 906 troponin negative lactic acid negative COVID negative flu negative creatinine 1.8 afebrile without leukocytosis.

## 2023-06-20 NOTE — H&P
[facility] [place] Emergency Dept  [facility] Medicine  History & Physical    Patient Name: Nelsy Marino  MRN: [id number]  Patient Class: OP- Observation  Admission Date: 6/20/2023  Attending Physician: Angel Chinchilla MD   Primary Care Provider: [facility]         Patient information was obtained from patient, past medical records and ER records.     Subjective:     Principal Problem:Acute on chronic diastolic heart failure    Chief Complaint:   Chief Complaint   Patient presents with    Shortness of Breath     Pt BIB EMS for SOB x1 day and nose bleed today. Pt has auditory wheezing present. Pt denied COPD or lung disease. EMS stated Pt was 90% 3 LPM with excessively long tubing. Pt now 97% on 3 LPM. Pt denied chest pain.        HPI: Nelsy Marino 82 y.o. female with HFpEF, HTN, PAD S/P Fem/fem bypass on chronic suppressive abx presents to the hospital with a chief complaint of SOB.  She reports she developed shortness of breath last night that she attempted treatment for at home her home oxygen without improvement.  After she began wearing her nasal cannula she developed epistaxis which has resolved without intervention.  She continues to feel short of breath.  She reports she is not on a diuretic outpatient.  She and her  deny any salty food intake.  She denies fever chest pain nausea vomiting abdominal pain melena hematuria hematemesis productive and sick contacts.    In the ED, hypoxic to 87% on room air chest x-ray with pulmonary edema and bilateral pleural effusions BNP at 906 troponin negative lactic acid negative COVID negative flu negative creatinine 1.8 afebrile without leukocytosis.      Past Medical History:   Diagnosis Date    CHF (congestive heart failure)     Diabetes mellitus     DVT (deep venous thrombosis)     Hypertension     Miscarriage     On home oxygen therapy     PRN       Past Surgical History:   Procedure Laterality Date     femoral vascular surgery       ANGIOGRAPHY OF  LOWER EXTREMITY Right 2/23/2023    Procedure: Angiogram Extremity Unilateral;  Surgeon: Danny Dunbar MD;  Location: [facility] OR;  Service: Vascular;  Laterality: Right;  RN PHONE PREOP 2/20/2023----CK CONSENT    ANGIOGRAPHY OF LOWER EXTREMITY Right 4/4/2023    Procedure: Angiogram Extremity Unilateral - possible endovascular intervention;  Surgeon: René Simms MD;  [facility];  Service: Vascular;  Laterality: Right;  19.6 mins.  740.81 mGy  73.3399 Gy.cm  58ml dye  local - 1ml    COLONOSCOPY N/A 5/18/2023    Procedure: COLONOSCOPY;  Surgeon: Cuong Aguiar MD;  Location: [facility] ENDO;  Service: Endoscopy;  Laterality: N/A;    CREATION OF FEMORAL-FEMORAL ARTERY BYPASS WITH GRAFT Bilateral 3/2/2023    Procedure: CREATION, BYPASS, ARTERIAL, FEMORAL TO FEMORAL, USING GRAFT, LEFT FEMORAL ENDARTERECTOMY, LEFT ILIAC ARTERY STENT PLACEMENT, RIGHT LOWER EXTREMITY ANGIOGRAM AND RIGHT SFA  ANGIOPLASTY;  Surgeon: Danny Dunbar MD;  [facility];  Service: Vascular;  Laterality: Bilateral;   744.47 MGY  175.36 Gycm  Flouro time 20.5 mins      CREATION OF ILIOFEMORAL ARTERY BYPASS Right 3/20/2019    Procedure: CREATION, BYPASS, ARTERIAL, ILIAC TO FEMORAL, RIGHT LOWER EXTREMITY, RIGHT PROFUNDAPLASTY;  Surgeon: Danny Dunbar MD;  Location: [facility] OR;  Service: Vascular;  Laterality: Right;  11:00AM START PER ANNE  RN PREOP 3/13/2019  ACT'S, CELL SAVER, GRAFTS, BOOK WATER---NEED H/P  CONSENT IN AM------HGBA1C    CREATION OF MUSCLE ROTATIONAL FLAP Left 3/31/2023    Procedure: CREATION, FLAP, MUSCLE ROTATION;  Surgeon: Brandon Smiley MD;  Location: [facility] OR [facility] FLR;  Service: Plastics;  Laterality: Left;    ENDOSCOPY OF PROXIMAL SMALL INTESTINE N/A 5/18/2023    Procedure: ENTEROSCOPY, PROXIMAL;  Surgeon: Cuong Aguiar MD;  Location: [facility] ENDO;  Service: Endoscopy;  Laterality: N/A;    ESOPHAGOGASTRODUODENOSCOPY N/A 3/17/2023    Procedure: EGD  (ESOPHAGOGASTRODUODENOSCOPY);  Surgeon: Gee Rodriguez MD;  Location: [facility] ENDO (2ND FLR);  Service: Endoscopy;  Laterality: N/A;    ESOPHAGOGASTRODUODENOSCOPY N/A 4/11/2023    Procedure: EGD (ESOPHAGOGASTRODUODENOSCOPY);  Surgeon: Nazanin Kaiser MD;  Location: [facility] ENDO (2ND FLR);  Service: Endoscopy;  Laterality: N/A;    ESOPHAGOGASTRODUODENOSCOPY N/A 4/26/2023    Procedure: EGD (ESOPHAGOGASTRODUODENOSCOPY);  Surgeon: Gee Rodriguez MD;  Location: [facility] ENDO (2ND FLR);  Service: Endoscopy;  Laterality: N/A;    ESOPHAGOGASTRODUODENOSCOPY N/A 5/2/2023    Procedure: EGD (ESOPHAGOGASTRODUODENOSCOPY);  Surgeon: Cuong Conteh MD;  Location: [facility] ENDO (2ND FLR);  Service: Endoscopy;  Laterality: N/A;    FOOT AMPUTATION THROUGH METATARSAL Right 3/4/2023    Procedure: AMPUTATION, FOOT, TRANSMETATARSAL;  Surgeon: Benjamin Godfrey DPM;  [facility];  Service: Podiatry;  Laterality: Right;    HYSTERECTOMY      ?unsure cervix present    INCISION AND DRAINAGE Right 4/12/2019    Procedure: Incision and Drainage - R groin washout, possible muscle flap;  Surgeon: Danny Dunbar MD;  Location: [facility] OR [address] [place] [address];  Service: Vascular;  Laterality: Right;  groin washout    INCISION AND DRAINAGE OF GROIN Right 5/3/2019    Procedure: Incision and Drainage R groin;  Surgeon: Danny Dunbar MD;  [facility];  Service: Vascular;  Laterality: Right;    WOUND DEBRIDEMENT Left 3/31/2023    Procedure: DEBRIDEMENT, WOUND;  Surgeon: René Simms MD;  [facility];  Service: Vascular;  Laterality: Left;       Review of patient's allergies indicates:   Allergen Reactions    Motrin [ibuprofen] Itching       No current facility-administered medications on file prior to encounter.     Current Outpatient Medications on File Prior to Encounter   Medication Sig    acetaminophen (TYLENOL) 500 MG tablet Take 2 tablets (1,000 mg total) by mouth every 8 (eight) hours.     "amLODIPine (NORVASC) 10 MG tablet Take 1 tablet (10 mg total) by mouth once daily.    atorvastatin (LIPITOR) 40 MG tablet Take 1 tablet (40 mg total) by mouth every evening.    BLOOD SUGAR DIAGNOSTIC (TRUE METRIX GLUCOSE TEST STRIP MISC) by Misc.(Non-Drug; Combo Route) route.    carvediloL (COREG) 25 MG tablet Take 1 tablet (25 mg total) by mouth 2 (two) times daily.    cephALEXin (KEFLEX) 500 MG capsule Take 1 capsule (500 mg total) by mouth every 12 (twelve) hours.    diphenhydrAMINE (BENADRYL) 25 mg capsule Take 25 mg by mouth daily as needed for Itching.    DULoxetine (CYMBALTA) 30 MG capsule Take 1 capsule (30 mg total) by mouth 2 (two) times daily.    fluconazole (DIFLUCAN) 200 MG Tab Take 2 tablets (400 mg total) by mouth once daily.    hydrALAZINE (APRESOLINE) 25 MG tablet Take 3 tablets (75 mg total) by mouth every 8 (eight) hours.    insulin detemir U-100, Levemir, 100 unit/mL (3 mL) SubQ InPn pen Inject 5 Units into the skin every evening.    insulin syringe-needle,dispos. 0.3 mL 30 gauge x 5/16" Syrg by Misc.(Non-Drug; Combo Route) route.    oxyCODONE (ROXICODONE) 5 MG immediate release tablet Take 1 tablet (5 mg total) by mouth every 12 (twelve) hours as needed.    oxyCODONE-acetaminophen (PERCOCET) 5-325 mg per tablet Take 1 tablet by mouth every 8 (eight) hours as needed for Pain.    pantoprazole (PROTONIX) 40 MG tablet Take 1 tablet (40 mg total) by mouth once daily.    pen needle, diabetic 32 gauge x 5/32" Ndle Inject 1 each into the skin 4 (four) times daily.    vitamin D (VITAMIN D3) 1000 units Tab Take 1 tablet (1,000 Units total) by mouth once daily.     Family History       Problem Relation (Age of Onset)    Diabetes Father    Hypertension Father          Tobacco Use    Smoking status: Former     Types: Cigarettes     Quit date:      Years since quittin.4    Smokeless tobacco: Never   Substance and Sexual Activity    Alcohol use: No    Drug use: No    Sexual " activity: Never     Review of Systems   Constitutional:  Negative for chills and fever.   HENT:  Positive for nosebleeds. Negative for tinnitus.    Eyes:  Negative for photophobia and visual disturbance.   Respiratory:  Positive for shortness of breath. Negative for wheezing.    Cardiovascular:  Negative for chest pain, palpitations and leg swelling.   Gastrointestinal:  Negative for abdominal distention, nausea and vomiting.   Genitourinary:  Negative for dysuria, flank pain and hematuria.   Musculoskeletal:  Negative for gait problem and joint swelling.   Skin:  Negative for rash and wound.   Neurological:  Negative for seizures and syncope.   Objective:     Vital Signs (Most Recent):  Temp: 97.7 °F (36.5 °C) (06/20/23 1727)  Pulse: 69 (06/20/23 1802)  Resp: (!) 23 (06/20/23 1801)  BP: 114/67 (06/20/23 1802)  SpO2: 95 % (06/20/23 1802) Vital Signs (24h Range):  Temp:  [97.7 °F (36.5 °C)-98.1 °F (36.7 °C)] 97.7 °F (36.5 °C)  Pulse:  [65-69] 69  Resp:  [20-24] 23  SpO2:  [87 %-97 %] 95 %  BP: (110-129)/(53-79) 114/67     Weight: 66.7 kg (147 lb)  Body mass index is 24.46 kg/m².     Physical Exam  Vitals and nursing note reviewed.   Constitutional:       General: She is not in acute distress.     Appearance: She is well-developed. She is not diaphoretic.   HENT:      Head: Normocephalic and atraumatic.      Right Ear: External ear normal.      Left Ear: External ear normal.      Nose:      Comments: No active epistaxis  Eyes:      General:         Right eye: No discharge.         Left eye: No discharge.      Conjunctiva/sclera: Conjunctivae normal.   Neck:      Thyroid: No thyromegaly.   Cardiovascular:      Rate and Rhythm: Normal rate and regular rhythm.      Heart sounds: No murmur heard.  Pulmonary:      Effort: Pulmonary effort is normal. No respiratory distress.      Breath sounds: Wheezing and rales present.      Comments: On supplemental oxygen speaking in full sentences  Abdominal:      General: Bowel sounds  are normal. There is no distension.      Palpations: Abdomen is soft. There is no mass.      Tenderness: There is no abdominal tenderness.   Musculoskeletal:         General: No deformity.      Cervical back: Normal range of motion and neck supple.      Right lower leg: No edema.      Left lower leg: No edema.   Skin:     General: Skin is warm and dry.   Neurological:      Mental Status: She is alert and oriented to person, place, and time.      Sensory: No sensory deficit.   Psychiatric:         Mood and Affect: Mood normal.         Behavior: Behavior normal.              Significant Labs: CBC:   Recent Labs   Lab 06/20/23  1641   WBC 9.78   HGB 8.3*   HCT 26.0*        CMP:   Recent Labs   Lab 06/20/23  1641      K 3.2*      CO2 27   *   BUN 32*   CREATININE 1.8*   CALCIUM 8.8   PROT 6.9   ALBUMIN 2.5*   BILITOT 0.5   ALKPHOS 83   AST 23   ALT 9*   ANIONGAP 12     Cardiac Markers:   Recent Labs   Lab 06/20/23  1641   *     Lactic Acid:   Recent Labs   Lab 06/20/23  1749   LACTATE 1.1     Troponin:   Recent Labs   Lab 06/20/23  1641   TROPONINI 0.013       Significant Imaging:   Imaging Results              X-Ray Chest AP Portable (Final result)  Result time 06/20/23 17:30:10      Final result by Kenny Gipson MD (06/20/23 17:30:10)                   Impression:      1. Pulmonary findings suggest edema with small pleural effusions.  No large focal consolidation.      Electronically signed by: Kenny Gipson MD  Date:    06/20/2023  Time:    17:30               Narrative:    EXAMINATION:  XR CHEST AP PORTABLE    CLINICAL HISTORY:  CHF;    TECHNIQUE:  Single frontal view of the chest was performed.    COMPARISON:  05/16/2023    FINDINGS:  Patient is rotated.  The cardiomediastinal silhouette is prominent, similar to the previous exam noting magnification by technique..  There is obscuration of the costophrenic angles suggesting small effusions..  The trachea is midline.  The  lungs are symmetrically expanded bilaterally with prominent interstitial attenuation in a perihilar distribution suggesting edema..  No large focal consolidation seen.  There is no pneumothorax.  The osseous structures are remarkable for degenerative changes noting osteopenia..                                        Assessment/Plan:     * Acute on chronic diastolic heart failure  Echo 3/2023 with EF of 65% and grade 2 DD. With elevated BNP, pulmonary edema and effusions on x-ray. Not on diuretic outpt.   Started on IV lasix  Daily weights/strict intake and output  Wean supplemental oxygen  Home coreg held as BP on lower limits of normal    Hypoxia  Hypoxic to 87% on RA on arrival, reports has PRN home oxygen. Chest x-ray with pulmonary findings suggesting edema and small pleural effusion  Started on diuresis  Wean supplemental oxygen  Seen above    Hypokalemia  Potassium of 3.2 on arrival. Will replete oral and check Mg    LOLY (acute kidney injury)  Presents with Cr of 1.8. baseline of 0.9. with signs of volume overload on exam.  Started on IV lasix  Check UA/urine protein/creatinine ratio as previous UA with proteinuria  Retroperitoneal US      Patient with acute kidney injury likely due to possibly cardiorenal syndrome LOLY is currently stable. Labs reviewed- Renal function/electrolytes with Estimated Creatinine Clearance: 21.7 mL/min (A) (based on SCr of 1.8 mg/dL (H)). according to latest data. Monitor urine output and serial BMP and adjust therapy as needed. Avoid nephrotoxins and renally dose meds for GFR listed above.       Anemia  Presents with hemoglobin of 8.3. previous baseline near 9. Denies bleeding no indication for transfusion at this time  Continue to monitor    Type 2 diabetes mellitus with chronic kidney disease, with long-term current use of insulin  Patient's FSGs are controlled on current medication regimen.  Last A1c reviewed-   Lab Results   Component Value Date    HGBA1C 8.2 (H) 02/28/2023      Most recent fingerstick glucose reviewed-   Recent Labs   Lab 06/20/23  1757   POCTGLUCOSE 164*     Current correctional scale  Low  Maintain anti-hyperglycemic dose as follows-   Antihyperglycemics (From admission, onward)    Start     Stop Route Frequency Ordered    06/20/23 1851  insulin aspart U-100 pen 0-5 Units         -- SubQ Before meals & nightly PRN 06/20/23 1752        Hold Oral hypoglycemics while patient is in the hospital.    Essential hypertension  BP well controlled. Resume home coreg/hydralazine if BP elevates    PAD (peripheral artery disease)  S/P fem/fem bypass 3/3023. Did not tolerate aspirin/plavix to due GI bleeding per chart review  contineu home statin. 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   Continue home keflex and fluconazole (QTc 473 today ranges 415-453)      VTE Risk Mitigation (From admission, onward)         Ordered     IP VTE HIGH RISK PATIENT  Once         06/20/23 1749     Place sequential compression device  Until discontinued         06/20/23 1749                 Discussed with ED physician.    VTE: SCD/GABE  Code: full  Diet: diabetic  Dispo: pending diuresis  On 06/20/2023, patient should be placed in hospital observation services under my care in collaboration with Angel Chinchilla MD.      Jude Basurto PA-C  Department of Hospital Medicine  [facility] - Emergency Dept

## 2023-06-20 NOTE — ASSESSMENT & PLAN NOTE
Hypoxic to 87% on RA on arrival, reports has PRN home oxygen. Chest x-ray with pulmonary findings suggesting edema and small pleural effusion  Started on diuresis  Wean supplemental oxygen  Seen above

## 2023-06-20 NOTE — ED PROVIDER NOTES
"Encounter Date: 6/20/2023    SCRIBE #1 NOTE: I, Isa Rodriguez Kayla, am scribing for, and in the presence of,  Umang Brewster MD. I have scribed the following portions of the note - Other sections scribed: HPI, ROS, PE.     History     Chief Complaint   Patient presents with    Shortness of Breath     Pt BIB EMS for SOB x1 day and nose bleed today. Pt has auditory wheezing present. Pt denied COPD or lung disease. EMS stated Pt was 90% 3 LPM with excessively long tubing. Pt now 97% on 3 LPM. Pt denied chest pain.     82 years old female with a PMHx of CHF, DM, DVT, HTN, and PSHx of femoral vascular surgery, creation of femoral-femoral artery bypass, and iliofemoral artery bypass, hysterectomy, incision and drainage of groin, who presents to the ED via EMS for a chief complaints of shortness of breath onset 1 day ago. Patient reports she has a nose bleed today PTA, and is feeling "very sleepy" at the moment. She further reports she is unable to walk due to her "bad" right leg. Per EMS, patient was 90% on 3 liters per minute with excessively long tubing. Endorses congestion, cough and wheezing. Further endorses normal urination and defecation. No other alleviating or exacerbating factors. Denies on Oxygen at home or on diuretics. Further denies fever, chills, abdominal pain, chest pain, or other associated symptoms. Also denies smoking, or any EtOH, or drugs uses. Patient denies Hx of COPD or lung disease. She is allergic to Motrin/ Ibuprofen.    3/9/23  ECHO  · The estimated ejection fraction is 65%.  · The left ventricle is normal in size with normal systolic function.  · Grade II left ventricular diastolic dysfunction.  · Normal right ventricular size with normal right ventricular systolic function.  · Mild left atrial enlargement.  · Mild-to-moderate mitral regurgitation.  · There is pulmonary hypertension.  · The estimated PA systolic pressure is 55 mmHg.  · Intermediate central venous pressure (8 mmHg).        The " history is provided by the patient. No  was used.   Review of patient's allergies indicates:   Allergen Reactions    Motrin [ibuprofen] Itching     Past Medical History:   Diagnosis Date    CHF (congestive heart failure)     Diabetes mellitus     DVT (deep venous thrombosis)     Hypertension     Miscarriage      Past Surgical History:   Procedure Laterality Date     femoral vascular surgery       ANGIOGRAPHY OF LOWER EXTREMITY Right 2/23/2023    Procedure: Angiogram Extremity Unilateral;  Surgeon: Danny Dunbar MD;  Location: [facility] OR;  Service: Vascular;  Laterality: Right;  RN PHONE PREOP 2/20/2023----CK CONSENT    ANGIOGRAPHY OF LOWER EXTREMITY Right 4/4/2023    Procedure: Angiogram Extremity Unilateral - possible endovascular intervention;  Surgeon: René Simms MD;  [facility];  Service: Vascular;  Laterality: Right;  19.6 mins.  740.81 mGy  73.3399 Gy.cm  58ml dye  local - 1ml    COLONOSCOPY N/A 5/18/2023    Procedure: COLONOSCOPY;  Surgeon: Cuong Aguiar MD;  Location: [facility] ENDO;  Service: Endoscopy;  Laterality: N/A;    CREATION OF FEMORAL-FEMORAL ARTERY BYPASS WITH GRAFT Bilateral 3/2/2023    Procedure: CREATION, BYPASS, ARTERIAL, FEMORAL TO FEMORAL, USING GRAFT, LEFT FEMORAL ENDARTERECTOMY, LEFT ILIAC ARTERY STENT PLACEMENT, RIGHT LOWER EXTREMITY ANGIOGRAM AND RIGHT SFA  ANGIOPLASTY;  Surgeon: Danny Dunbar MD;  [facility];  Service: Vascular;  Laterality: Bilateral;   744.47 MGY  175.36 Gycm  Flouro time 20.5 mins      CREATION OF ILIOFEMORAL ARTERY BYPASS Right 3/20/2019    Procedure: CREATION, BYPASS, ARTERIAL, ILIAC TO FEMORAL, RIGHT LOWER EXTREMITY, RIGHT PROFUNDAPLASTY;  Surgeon: Danny Dunbar MD;  Location: [facility] OR;  Service: Vascular;  Laterality: Right;  11:00AM START PER ANNE RICHARDS PREOP 3/13/2019  ACT'S, CELL SAVER, GRAFTS, BOOK WATER---NEED H/P  CONSENT IN AM------HGBA1C    CREATION OF MUSCLE ROTATIONAL FLAP Left  3/31/2023    Procedure: CREATION, FLAP, MUSCLE ROTATION;  Surgeon: Brandon Smiley MD;  Location: [facility] OR 2ND FLR;  Service: Plastics;  Laterality: Left;    ENDOSCOPY OF PROXIMAL SMALL INTESTINE N/A 5/18/2023    Procedure: ENTEROSCOPY, PROXIMAL;  Surgeon: Cuong Aguiar MD;  Location: [facility] ENDO;  Service: Endoscopy;  Laterality: N/A;    ESOPHAGOGASTRODUODENOSCOPY N/A 3/17/2023    Procedure: EGD (ESOPHAGOGASTRODUODENOSCOPY);  Surgeon: Gee Rodriguez MD;  Location: [facility] ENDO (2ND FLR);  Service: Endoscopy;  Laterality: N/A;    ESOPHAGOGASTRODUODENOSCOPY N/A 4/11/2023    Procedure: EGD (ESOPHAGOGASTRODUODENOSCOPY);  Surgeon: Nazanin Kaiser MD;  Location: [facility] (2ND FLR);  Service: Endoscopy;  Laterality: N/A;    ESOPHAGOGASTRODUODENOSCOPY N/A 4/26/2023    Procedure: EGD (ESOPHAGOGASTRODUODENOSCOPY);  Surgeon: Gee Rodriguez MD;  Location: [facility] (2ND FLR);  Service: Endoscopy;  Laterality: N/A;    ESOPHAGOGASTRODUODENOSCOPY N/A 5/2/2023    Procedure: EGD (ESOPHAGOGASTRODUODENOSCOPY);  Surgeon: Cuong Conteh MD;  Location: [facility] ENDO (2ND FLR);  Service: Endoscopy;  Laterality: N/A;    FOOT AMPUTATION THROUGH METATARSAL Right 3/4/2023    Procedure: AMPUTATION, FOOT, TRANSMETATARSAL;  Surgeon: Benjamin Godfrey DPM;  [facility];  Service: Podiatry;  Laterality: Right;    HYSTERECTOMY      ?unsure cervix present    INCISION AND DRAINAGE Right 4/12/2019    Procedure: Incision and Drainage - R groin washout, possible muscle flap;  Surgeon: Danny Dunbar MD;  [facility];  Service: Vascular;  Laterality: Right;  groin washout    INCISION AND DRAINAGE OF GROIN Right 5/3/2019    Procedure: Incision and Drainage R groin;  Surgeon: Danny Dunbar MD;  [facility];  Service: Vascular;  Laterality: Right;    WOUND DEBRIDEMENT Left 3/31/2023    Procedure: DEBRIDEMENT, WOUND;  Surgeon: René Simms MD;  Location: [facility] OR [address] [place] [address];  Service:  Vascular;  Laterality: Left;     Family History   Problem Relation Age of Onset    Diabetes Father     Hypertension Father      Social History     Tobacco Use    Smoking status: Former     Types: Cigarettes     Quit date: 1980     Years since quittin.4    Smokeless tobacco: Never   Substance Use Topics    Alcohol use: No    Drug use: No     Review of Systems   Constitutional:  Negative for chills and fever.   HENT:  Positive for congestion and nosebleeds (is resolved).    Respiratory:  Positive for cough, shortness of breath and wheezing.    Cardiovascular:  Negative for chest pain.   Gastrointestinal:  Negative for abdominal pain.   Musculoskeletal:         (+) right leg trouble    Neurological:         (+) Sleepiness     Physical Exam     Initial Vitals [23 1619]   BP Pulse Resp Temp SpO2   129/61 67 (!) 24 98.1 °F (36.7 °C) 97 %      MAP       --         Physical Exam    Nursing note and vitals reviewed.  Constitutional: She appears well-developed and well-nourished.   HENT:   Head: Normocephalic.   Right Ear: External ear normal.   Left Ear: External ear normal.   Mouth/Throat: Oropharynx is clear and moist.   Eyes: EOM are normal. Pupils are equal, round, and reactive to light.   Neck:   Normal range of motion.  Cardiovascular:  Normal rate and regular rhythm.           Pulmonary/Chest: No respiratory distress. She has wheezes.   Diminished breath sounds. Wheezing bilaterally. Patient is currently on 97% 3 LMP Oxygen setting     Abdominal: Abdomen is soft. Bowel sounds are normal. She exhibits no distension. There is no abdominal tenderness.   Musculoskeletal:         General: No tenderness or edema. Normal range of motion.      Cervical back: Normal range of motion.     Neurological: She is alert and oriented to person, place, and time. She has normal strength. GCS score is 15. GCS eye subscore is 4. GCS verbal subscore is 5. GCS motor subscore is 6.   Skin: Skin is warm and dry. Capillary refill  takes less than 2 seconds.   Psychiatric: She has a normal mood and affect. Thought content normal.   Talking in a 3-4 words sentences.       ED Course   Procedures  Labs Reviewed   CBC W/ AUTO DIFFERENTIAL - Abnormal; Notable for the following components:       Result Value    RBC 3.24 (*)     Hemoglobin 8.3 (*)     Hematocrit 26.0 (*)     MCV 80 (*)     MCH 25.6 (*)     MCHC 31.9 (*)     RDW 14.6 (*)     Gran # (ANC) 7.9 (*)     Gran % 80.8 (*)     Lymph % 11.3 (*)     All other components within normal limits   COMPREHENSIVE METABOLIC PANEL - Abnormal; Notable for the following components:    Potassium 3.2 (*)     Glucose 168 (*)     BUN 32 (*)     Creatinine 1.8 (*)     Albumin 2.5 (*)     ALT 9 (*)     eGFR 28 (*)     All other components within normal limits   B-TYPE NATRIURETIC PEPTIDE - Abnormal; Notable for the following components:     (*)     All other components within normal limits   CULTURE, BLOOD   CULTURE, BLOOD   TROPONIN I   PROTIME-INR   LACTIC ACID, PLASMA   PROCALCITONIN   URINALYSIS, REFLEX TO URINE CULTURE   PROTEIN / CREATININE RATIO, URINE   POCT INFLUENZA A/B MOLECULAR   SARS-COV-2 RDRP GENE   POCT GLUCOSE MONITORING CONTINUOUS          Imaging Results              X-Ray Chest AP Portable (Final result)  Result time 06/20/23 17:30:10      Final result by Kenny Gipson MD (06/20/23 17:30:10)                   Impression:      1. Pulmonary findings suggest edema with small pleural effusions.  No large focal consolidation.      Electronically signed by: Kenny Gipson MD  Date:    06/20/2023  Time:    17:30               Narrative:    EXAMINATION:  XR CHEST AP PORTABLE    CLINICAL HISTORY:  CHF;    TECHNIQUE:  Single frontal view of the chest was performed.    COMPARISON:  05/16/2023    FINDINGS:  Patient is rotated.  The cardiomediastinal silhouette is prominent, similar to the previous exam noting magnification by technique..  There is obscuration of the costophrenic angles  suggesting small effusions..  The trachea is midline.  The lungs are symmetrically expanded bilaterally with prominent interstitial attenuation in a perihilar distribution suggesting edema..  No large focal consolidation seen.  There is no pneumothorax.  The osseous structures are remarkable for degenerative changes noting osteopenia..                                       Medications   furosemide injection 40 mg (has no administration in time range)   albuterol sulfate nebulizer solution 10 mg (10 mg Nebulization Given 6/20/23 1706)   ipratropium 0.02 % nebulizer solution 1 mg (1 mg Nebulization Given 6/20/23 1706)     Medical Decision Making:   History:   Old Medical Records: I decided to obtain old medical records.  Old Records Summarized: other records.       <> Summary of Records: External documents reviewed.  Initial Assessment:   Pt presenting 2/2 rales in fluid overload and shortness of breath consistent with CHF exacerbation.  Patient is given IV Lasix.  Considered nitroglycerin.  If worsening respiratory status will consider BiPAP and/or intubation.  O2 sat off oxygen was 87% and patient's oxygen dropped fairly quickly.  Placed back on 3 L.  Not on home oxygen.  Breathing treatments ordered for the patient.    Also considered but less likely:  Acs: ekg doesn't show stemi. Troponin ordered  Pna: symptoms bilaterally and no fevers.   Bronchitis: considered but hpi most c/w CHF  COPD:  Considered but less likely  Pneumothorax: bilateral breath sounds    Labs notable for an elevated BNP.  Troponin is reassuring.  Potassium slightly low at 3.2.  Chest x-ray shows pulmonary edema.  Placed observation further workup management.    Please put in 35 minutes of critical care due to patient having a high risk of respiratory failure.   Separate from teaching and exclusive of procedure and ekg time  Includes:  Time at bedside  Time reviewing test results  Time discussing case with staff  Time documenting the medical  record  Time spent with family members  Time spent with consults  Management        Clinical Tests:   Lab Tests: Ordered and Reviewed  Radiological Study: Ordered and Reviewed  Medical Tests: Ordered and Reviewed        Scribe Attestation:   Scribe #1: I performed the above scribed service and the documentation accurately describes the services I performed. I attest to the accuracy of the note.                 Scribe attestation: I, umang brewster, personally performed the services described in this documentation. All medical record entries made by the scribe were at my direction and in my presence.  I have reviewed the chart and agree that the record reflects my personal performance and is accurate and complete.    Clinical Impression:   Final diagnoses:  [R06.02] Shortness of breath  [I50.9] Congestive heart failure, unspecified HF chronicity, unspecified heart failure type (Primary)  [J81.0] Acute pulmonary edema  [R09.02] Hypoxia        ED Disposition Condition    Observation Stable                Umang Brewster MD  06/20/23 [id number]

## 2023-06-20 NOTE — ED NOTES
Two sets of cultures were drawn and collect along with a gold top and lactic. Was not able to scan and collect any of these collections. Spoke with conception in the lab. All blood was received by her in the lab and were able to get scanned on her end.

## 2023-06-20 NOTE — ASSESSMENT & PLAN NOTE
Presents with hemoglobin of 8.3. previous baseline near 9. Denies bleeding no indication for transfusion at this time  Continue to monitor

## 2023-06-20 NOTE — ASSESSMENT & PLAN NOTE
Patient's FSGs are controlled on current medication regimen.  Last A1c reviewed-   Lab Results   Component Value Date    HGBA1C 8.2 (H) 02/28/2023     Most recent fingerstick glucose reviewed-   Recent Labs   Lab 06/20/23  1757   POCTGLUCOSE 164*     Current correctional scale  Low  Maintain anti-hyperglycemic dose as follows-   Antihyperglycemics (From admission, onward)    Start     Stop Route Frequency Ordered    06/20/23 1851  insulin aspart U-100 pen 0-5 Units         -- SubQ Before meals & nightly PRN 06/20/23 1752        Hold Oral hypoglycemics while patient is in the hospital.

## 2023-06-20 NOTE — ASSESSMENT & PLAN NOTE
Presents with Cr of 1.8. baseline of 0.9. with signs of volume overload on exam.  Started on IV lasix  Check UA/urine protein/creatinine ratio as previous UA with proteinuria  Retroperitoneal US      Patient with acute kidney injury likely due to possibly cardiorenal syndrome LOLY is currently stable. Labs reviewed- Renal function/electrolytes with Estimated Creatinine Clearance: 21.7 mL/min (A) (based on SCr of 1.8 mg/dL (H)). according to latest data. Monitor urine output and serial BMP and adjust therapy as needed. Avoid nephrotoxins and renally dose meds for GFR listed above.

## 2023-06-20 NOTE — ASSESSMENT & PLAN NOTE
S/P fem/fem bypass 3/3023. Did not tolerate aspirin/plavix to due GI bleeding per chart review  contineu home statin. 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   Continue home keflex and fluconazole (QTc 473 today ranges 415-453)

## 2023-06-20 NOTE — ASSESSMENT & PLAN NOTE
Echo 3/2023 with EF of 65% and grade 2 DD. With elevated BNP, pulmonary edema and effusions on x-ray. Not on diuretic outpt.   Started on IV lasix  Daily weights/strict intake and output  Wean supplemental oxygen  Home coreg held as BP on lower limits of normal

## 2023-06-20 NOTE — Clinical Note
N/A: .   Scrubbed with Chlorhexidine/Alcohol.    Hair: N/A.  Skin prep dry before draping.  Prepped by: RT Ronni 6/23/2023 9:09 AM.

## 2023-06-20 NOTE — ED NOTES
Patient identifiers verified and correct for Ms. Marino  C/C: shortness of breath  APPEARANCE: awake and alert in NAD.  SKIN: warm, dry and intact. No breakdown or bruising.  MUSCULOSKELETAL: Patient moving all extremities spontaneously, no obvious swelling or deformities noted.   RESPIRATORY: shortness of breath with labored breathing  CARDIAC: Denies CP,  distal pulses; no peripheral edema  ABDOMEN: denies abdominal pain and n/v/d   : voids spontaneously, denies difficulty  Neurologic: AAO x 4; follows commands equal strength in all extremities; denies numbness/tingling. Denies dizziness

## 2023-06-20 NOTE — SUBJECTIVE & OBJECTIVE
Past Medical History:   Diagnosis Date    CHF (congestive heart failure)     Diabetes mellitus     DVT (deep venous thrombosis)     Hypertension     Miscarriage     On home oxygen therapy     PRN       Past Surgical History:   Procedure Laterality Date     femoral vascular surgery       ANGIOGRAPHY OF LOWER EXTREMITY Right 2/23/2023    Procedure: Angiogram Extremity Unilateral;  Surgeon: Danny Dunbar MD;  Location: [facility] OR;  Service: Vascular;  Laterality: Right;  RN PHONE PREOP 2/20/2023----CK CONSENT    ANGIOGRAPHY OF LOWER EXTREMITY Right 4/4/2023    Procedure: Angiogram Extremity Unilateral - possible endovascular intervention;  Surgeon: René Simms MD;  [facility];  Service: Vascular;  Laterality: Right;  19.6 mins.  740.81 mGy  73.3399 Gy.cm  58ml dye  local - 1ml    COLONOSCOPY N/A 5/18/2023    Procedure: COLONOSCOPY;  Surgeon: Cuong Aguiar MD;  Location: [facility] ENDO;  Service: Endoscopy;  Laterality: N/A;    CREATION OF FEMORAL-FEMORAL ARTERY BYPASS WITH GRAFT Bilateral 3/2/2023    Procedure: CREATION, BYPASS, ARTERIAL, FEMORAL TO FEMORAL, USING GRAFT, LEFT FEMORAL ENDARTERECTOMY, LEFT ILIAC ARTERY STENT PLACEMENT, RIGHT LOWER EXTREMITY ANGIOGRAM AND RIGHT SFA  ANGIOPLASTY;  Surgeon: Danny Dunbar MD;  [facility];  Service: Vascular;  Laterality: Bilateral;   744.47 MGY  175.36 Gycm  Flouro time 20.5 mins      CREATION OF ILIOFEMORAL ARTERY BYPASS Right 3/20/2019    Procedure: CREATION, BYPASS, ARTERIAL, ILIAC TO FEMORAL, RIGHT LOWER EXTREMITY, RIGHT PROFUNDAPLASTY;  Surgeon: Danny Dunbar MD;  Location: [facility] OR;  Service: Vascular;  Laterality: Right;  11:00AM START PER ANNE RICHARDS PREOP 3/13/2019  ACT'S, CELL SAVER, GRAFTS, BOOK WATER---NEED H/P  CONSENT IN AM------HGBA1C    CREATION OF MUSCLE ROTATIONAL FLAP Left 3/31/2023    Procedure: CREATION, FLAP, MUSCLE ROTATION;  Surgeon: Brandon Smiley MD;  [facility];  Service:  Plastics;  Laterality: Left;    ENDOSCOPY OF PROXIMAL SMALL INTESTINE N/A 5/18/2023    Procedure: ENTEROSCOPY, PROXIMAL;  Surgeon: Cuong Aguiar MD;  Location: [facility] ENDO;  Service: Endoscopy;  Laterality: N/A;    ESOPHAGOGASTRODUODENOSCOPY N/A 3/17/2023    Procedure: EGD (ESOPHAGOGASTRODUODENOSCOPY);  Surgeon: Gee Rodriguez MD;  Location: [facility] ENDO (2ND FLR);  Service: Endoscopy;  Laterality: N/A;    ESOPHAGOGASTRODUODENOSCOPY N/A 4/11/2023    Procedure: EGD (ESOPHAGOGASTRODUODENOSCOPY);  Surgeon: Nazanin Kaiser MD;  Location: [facility] ENDO (2ND FLR);  Service: Endoscopy;  Laterality: N/A;    ESOPHAGOGASTRODUODENOSCOPY N/A 4/26/2023    Procedure: EGD (ESOPHAGOGASTRODUODENOSCOPY);  Surgeon: Gee Rodriguez MD;  Location: [facility] ENDO (2ND FLR);  Service: Endoscopy;  Laterality: N/A;    ESOPHAGOGASTRODUODENOSCOPY N/A 5/2/2023    Procedure: EGD (ESOPHAGOGASTRODUODENOSCOPY);  Surgeon: Cuong Conteh MD;  Location: [facility] ENDO (2ND FLR);  Service: Endoscopy;  Laterality: N/A;    FOOT AMPUTATION THROUGH METATARSAL Right 3/4/2023    Procedure: AMPUTATION, FOOT, TRANSMETATARSAL;  Surgeon: Benjamin Godfrey DPM;  Location: [facility] OR [address] [place] [address];  Service: Podiatry;  Laterality: Right;    HYSTERECTOMY      ?unsure cervix present    INCISION AND DRAINAGE Right 4/12/2019    Procedure: Incision and Drainage - R groin washout, possible muscle flap;  Surgeon: Danny Dunbar MD;  [facility];  Service: Vascular;  Laterality: Right;  groin washout    INCISION AND DRAINAGE OF GROIN Right 5/3/2019    Procedure: Incision and Drainage R groin;  Surgeon: Danny Dunbar MD;  [facility];  Service: Vascular;  Laterality: Right;    WOUND DEBRIDEMENT Left 3/31/2023    Procedure: DEBRIDEMENT, WOUND;  Surgeon: René Simms MD;  [facility];  Service: Vascular;  Laterality: Left;       Review of patient's allergies indicates:   Allergen Reactions    Motrin [ibuprofen] Itching  "      No current facility-administered medications on file prior to encounter.     Current Outpatient Medications on File Prior to Encounter   Medication Sig    acetaminophen (TYLENOL) 500 MG tablet Take 2 tablets (1,000 mg total) by mouth every 8 (eight) hours.    amLODIPine (NORVASC) 10 MG tablet Take 1 tablet (10 mg total) by mouth once daily.    atorvastatin (LIPITOR) 40 MG tablet Take 1 tablet (40 mg total) by mouth every evening.    BLOOD SUGAR DIAGNOSTIC (TRUE METRIX GLUCOSE TEST STRIP MISC) by Misc.(Non-Drug; Combo Route) route.    carvediloL (COREG) 25 MG tablet Take 1 tablet (25 mg total) by mouth 2 (two) times daily.    cephALEXin (KEFLEX) 500 MG capsule Take 1 capsule (500 mg total) by mouth every 12 (twelve) hours.    diphenhydrAMINE (BENADRYL) 25 mg capsule Take 25 mg by mouth daily as needed for Itching.    DULoxetine (CYMBALTA) 30 MG capsule Take 1 capsule (30 mg total) by mouth 2 (two) times daily.    fluconazole (DIFLUCAN) 200 MG Tab Take 2 tablets (400 mg total) by mouth once daily.    hydrALAZINE (APRESOLINE) 25 MG tablet Take 3 tablets (75 mg total) by mouth every 8 (eight) hours.    insulin detemir U-100, Levemir, 100 unit/mL (3 mL) SubQ InPn pen Inject 5 Units into the skin every evening.    insulin syringe-needle,dispos. 0.3 mL 30 gauge x 5/16" Syrg by Misc.(Non-Drug; Combo Route) route.    oxyCODONE (ROXICODONE) 5 MG immediate release tablet Take 1 tablet (5 mg total) by mouth every 12 (twelve) hours as needed.    oxyCODONE-acetaminophen (PERCOCET) 5-325 mg per tablet Take 1 tablet by mouth every 8 (eight) hours as needed for Pain.    pantoprazole (PROTONIX) 40 MG tablet Take 1 tablet (40 mg total) by mouth once daily.    pen needle, diabetic 32 gauge x 5/32" Ndle Inject 1 each into the skin 4 (four) times daily.    vitamin D (VITAMIN D3) 1000 units Tab Take 1 tablet (1,000 Units total) by mouth once daily.     Family History       Problem Relation (Age of Onset)    Diabetes Father    " Hypertension Father          Tobacco Use    Smoking status: Former     Types: Cigarettes     Quit date: 1980     Years since quittin.4    Smokeless tobacco: Never   Substance and Sexual Activity    Alcohol use: No    Drug use: No    Sexual activity: Never     Review of Systems   Constitutional:  Negative for chills and fever.   HENT:  Positive for nosebleeds. Negative for tinnitus.    Eyes:  Negative for photophobia and visual disturbance.   Respiratory:  Positive for shortness of breath. Negative for wheezing.    Cardiovascular:  Negative for chest pain, palpitations and leg swelling.   Gastrointestinal:  Negative for abdominal distention, nausea and vomiting.   Genitourinary:  Negative for dysuria, flank pain and hematuria.   Musculoskeletal:  Negative for gait problem and joint swelling.   Skin:  Negative for rash and wound.   Neurological:  Negative for seizures and syncope.   Objective:     Vital Signs (Most Recent):  Temp: 97.7 °F (36.5 °C) (23 1727)  Pulse: 69 (23 1802)  Resp: (!) 23 (23 1801)  BP: 114/67 (23 1802)  SpO2: 95 % (23 1802) Vital Signs (24h Range):  Temp:  [97.7 °F (36.5 °C)-98.1 °F (36.7 °C)] 97.7 °F (36.5 °C)  Pulse:  [65-69] 69  Resp:  [20-24] 23  SpO2:  [87 %-97 %] 95 %  BP: (110-129)/(53-79) 114/67     Weight: 66.7 kg (147 lb)  Body mass index is 24.46 kg/m².     Physical Exam  Vitals and nursing note reviewed.   Constitutional:       General: She is not in acute distress.     Appearance: She is well-developed. She is not diaphoretic.   HENT:      Head: Normocephalic and atraumatic.      Right Ear: External ear normal.      Left Ear: External ear normal.      Nose:      Comments: No active epistaxis  Eyes:      General:         Right eye: No discharge.         Left eye: No discharge.      Conjunctiva/sclera: Conjunctivae normal.   Neck:      Thyroid: No thyromegaly.   Cardiovascular:      Rate and Rhythm: Normal rate and regular rhythm.      Heart sounds:  No murmur heard.  Pulmonary:      Effort: Pulmonary effort is normal. No respiratory distress.      Breath sounds: Wheezing and rales present.      Comments: On supplemental oxygen speaking in full sentences  Abdominal:      General: Bowel sounds are normal. There is no distension.      Palpations: Abdomen is soft. There is no mass.      Tenderness: There is no abdominal tenderness.   Musculoskeletal:         General: No deformity.      Cervical back: Normal range of motion and neck supple.      Right lower leg: No edema.      Left lower leg: No edema.   Skin:     General: Skin is warm and dry.   Neurological:      Mental Status: She is alert and oriented to person, place, and time.      Sensory: No sensory deficit.   Psychiatric:         Mood and Affect: Mood normal.         Behavior: Behavior normal.              Significant Labs: CBC:   Recent Labs   Lab 06/20/23  1641   WBC 9.78   HGB 8.3*   HCT 26.0*        CMP:   Recent Labs   Lab 06/20/23  1641      K 3.2*      CO2 27   *   BUN 32*   CREATININE 1.8*   CALCIUM 8.8   PROT 6.9   ALBUMIN 2.5*   BILITOT 0.5   ALKPHOS 83   AST 23   ALT 9*   ANIONGAP 12     Cardiac Markers:   Recent Labs   Lab 06/20/23  1641   *     Lactic Acid:   Recent Labs   Lab 06/20/23  1749   LACTATE 1.1     Troponin:   Recent Labs   Lab 06/20/23  1641   TROPONINI 0.013       Significant Imaging:   Imaging Results              X-Ray Chest AP Portable (Final result)  Result time 06/20/23 17:30:10      Final result by Kenny Gipson MD (06/20/23 17:30:10)                   Impression:      1. Pulmonary findings suggest edema with small pleural effusions.  No large focal consolidation.      Electronically signed by: Kenny Gipson MD  Date:    06/20/2023  Time:    17:30               Narrative:    EXAMINATION:  XR CHEST AP PORTABLE    CLINICAL HISTORY:  CHF;    TECHNIQUE:  Single frontal view of the chest was  performed.    COMPARISON:  05/16/2023    FINDINGS:  Patient is rotated.  The cardiomediastinal silhouette is prominent, similar to the previous exam noting magnification by technique..  There is obscuration of the costophrenic angles suggesting small effusions..  The trachea is midline.  The lungs are symmetrically expanded bilaterally with prominent interstitial attenuation in a perihilar distribution suggesting edema..  No large focal consolidation seen.  There is no pneumothorax.  The osseous structures are remarkable for degenerative changes noting osteopenia..

## 2023-06-21 ENCOUNTER — DOCUMENT SCAN (OUTPATIENT)
Dept: HOME HEALTH SERVICES | Facility: HOSPITAL | Age: 83
End: 2023-06-21
Payer: MEDICARE

## 2023-06-21 PROBLEM — T07.XXXA WOUNDS, MULTIPLE: Status: ACTIVE | Noted: 2023-06-21

## 2023-06-21 LAB
ANION GAP SERPL CALC-SCNC: 14 MMOL/L (ref 8–16)
ANION GAP SERPL CALC-SCNC: 15 MMOL/L (ref 8–16)
BACTERIA #/AREA URNS HPF: ABNORMAL /HPF
BASOPHILS # BLD AUTO: 0.02 K/UL (ref 0–0.2)
BASOPHILS NFR BLD: 0.2 % (ref 0–1.9)
BILIRUB UR QL STRIP: NEGATIVE
BUN SERPL-MCNC: 34 MG/DL (ref 8–23)
BUN SERPL-MCNC: 38 MG/DL (ref 8–23)
CALCIUM SERPL-MCNC: 8.7 MG/DL (ref 8.7–10.5)
CALCIUM SERPL-MCNC: 8.8 MG/DL (ref 8.7–10.5)
CHLORIDE SERPL-SCNC: 101 MMOL/L (ref 95–110)
CHLORIDE SERPL-SCNC: 102 MMOL/L (ref 95–110)
CK SERPL-CCNC: 171 U/L (ref 20–180)
CLARITY UR: ABNORMAL
CO2 SERPL-SCNC: 23 MMOL/L (ref 23–29)
CO2 SERPL-SCNC: 24 MMOL/L (ref 23–29)
COLOR UR: YELLOW
CREAT SERPL-MCNC: 1.9 MG/DL (ref 0.5–1.4)
CREAT SERPL-MCNC: 2 MG/DL (ref 0.5–1.4)
CREAT UR-MCNC: 329.7 MG/DL (ref 15–325)
DIFFERENTIAL METHOD: ABNORMAL
EOSINOPHIL # BLD AUTO: 0.2 K/UL (ref 0–0.5)
EOSINOPHIL NFR BLD: 2.3 % (ref 0–8)
ERYTHROCYTE [DISTWIDTH] IN BLOOD BY AUTOMATED COUNT: 14.7 % (ref 11.5–14.5)
EST. GFR  (NO RACE VARIABLE): 24 ML/MIN/1.73 M^2
EST. GFR  (NO RACE VARIABLE): 26 ML/MIN/1.73 M^2
ESTIMATED AVG GLUCOSE: 140 MG/DL (ref 68–131)
GLUCOSE SERPL-MCNC: 122 MG/DL (ref 70–110)
GLUCOSE SERPL-MCNC: 129 MG/DL (ref 70–110)
GLUCOSE UR QL STRIP: NEGATIVE
HBA1C MFR BLD: 6.5 % (ref 4–5.6)
HCT VFR BLD AUTO: 27 % (ref 37–48.5)
HGB BLD-MCNC: 8.3 G/DL (ref 12–16)
HGB UR QL STRIP: ABNORMAL
HYALINE CASTS #/AREA URNS LPF: 34 /LPF
IMM GRANULOCYTES # BLD AUTO: 0.04 K/UL (ref 0–0.04)
IMM GRANULOCYTES NFR BLD AUTO: 0.4 % (ref 0–0.5)
KETONES UR QL STRIP: ABNORMAL
LEUKOCYTE ESTERASE UR QL STRIP: NEGATIVE
LYMPHOCYTES # BLD AUTO: 1.2 K/UL (ref 1–4.8)
LYMPHOCYTES NFR BLD: 12.5 % (ref 18–48)
MCH RBC QN AUTO: 25.2 PG (ref 27–31)
MCHC RBC AUTO-ENTMCNC: 30.7 G/DL (ref 32–36)
MCV RBC AUTO: 82 FL (ref 82–98)
MICROSCOPIC COMMENT: ABNORMAL
MONOCYTES # BLD AUTO: 0.4 K/UL (ref 0.3–1)
MONOCYTES NFR BLD: 4.5 % (ref 4–15)
NEUTROPHILS # BLD AUTO: 7.9 K/UL (ref 1.8–7.7)
NEUTROPHILS NFR BLD: 80.1 % (ref 38–73)
NITRITE UR QL STRIP: NEGATIVE
NRBC BLD-RTO: 0 /100 WBC
PH UR STRIP: 5 [PH] (ref 5–8)
PLATELET # BLD AUTO: 400 K/UL (ref 150–450)
PMV BLD AUTO: 10.4 FL (ref 9.2–12.9)
POCT GLUCOSE: 130 MG/DL (ref 70–110)
POCT GLUCOSE: 156 MG/DL (ref 70–110)
POTASSIUM SERPL-SCNC: 3.5 MMOL/L (ref 3.5–5.1)
POTASSIUM SERPL-SCNC: 3.7 MMOL/L (ref 3.5–5.1)
PROT UR QL STRIP: ABNORMAL
PROT UR-MCNC: 34 MG/DL
PROT/CREAT UR: 0.1 MG/G{CREAT} (ref 0–0.2)
RBC # BLD AUTO: 3.29 M/UL (ref 4–5.4)
RBC #/AREA URNS HPF: >100 /HPF (ref 0–4)
SODIUM SERPL-SCNC: 139 MMOL/L (ref 136–145)
SODIUM SERPL-SCNC: 140 MMOL/L (ref 136–145)
SP GR UR STRIP: 1.03 (ref 1–1.03)
TROPONIN I SERPL DL<=0.01 NG/ML-MCNC: 0.01 NG/ML (ref 0–0.03)
URN SPEC COLLECT METH UR: ABNORMAL
UROBILINOGEN UR STRIP-ACNC: ABNORMAL EU/DL
WBC # BLD AUTO: 9.85 K/UL (ref 3.9–12.7)
WBC #/AREA URNS HPF: 5 /HPF (ref 0–5)
WBC CLUMPS URNS QL MICRO: ABNORMAL

## 2023-06-21 PROCEDURE — 25000242 PHARM REV CODE 250 ALT 637 W/ HCPCS: Performed by: NURSE PRACTITIONER

## 2023-06-21 PROCEDURE — 25000003 PHARM REV CODE 250: Performed by: PHYSICIAN ASSISTANT

## 2023-06-21 PROCEDURE — 85025 COMPLETE CBC W/AUTO DIFF WBC: CPT | Performed by: PHYSICIAN ASSISTANT

## 2023-06-21 PROCEDURE — 84156 ASSAY OF PROTEIN URINE: CPT | Performed by: EMERGENCY MEDICINE

## 2023-06-21 PROCEDURE — 94761 N-INVAS EAR/PLS OXIMETRY MLT: CPT

## 2023-06-21 PROCEDURE — G0378 HOSPITAL OBSERVATION PER HR: HCPCS

## 2023-06-21 PROCEDURE — 25000003 PHARM REV CODE 250: Performed by: NURSE PRACTITIONER

## 2023-06-21 PROCEDURE — 27000221 HC OXYGEN, UP TO 24 HOURS

## 2023-06-21 PROCEDURE — 81000 URINALYSIS NONAUTO W/SCOPE: CPT | Performed by: EMERGENCY MEDICINE

## 2023-06-21 PROCEDURE — 36415 COLL VENOUS BLD VENIPUNCTURE: CPT | Performed by: NURSE PRACTITIONER

## 2023-06-21 PROCEDURE — 63600175 PHARM REV CODE 636 W HCPCS: Performed by: NURSE PRACTITIONER

## 2023-06-21 PROCEDURE — 82550 ASSAY OF CK (CPK): CPT | Performed by: NURSE PRACTITIONER

## 2023-06-21 PROCEDURE — 36415 COLL VENOUS BLD VENIPUNCTURE: CPT | Performed by: PHYSICIAN ASSISTANT

## 2023-06-21 PROCEDURE — 51798 US URINE CAPACITY MEASURE: CPT

## 2023-06-21 PROCEDURE — 80048 BASIC METABOLIC PNL TOTAL CA: CPT | Performed by: PHYSICIAN ASSISTANT

## 2023-06-21 PROCEDURE — 84484 ASSAY OF TROPONIN QUANT: CPT | Performed by: PHYSICIAN ASSISTANT

## 2023-06-21 PROCEDURE — 80048 BASIC METABOLIC PNL TOTAL CA: CPT | Mod: 91 | Performed by: NURSE PRACTITIONER

## 2023-06-21 PROCEDURE — 63700000 PHARM REV CODE 250 ALT 637 W/O HCPCS: Performed by: PHYSICIAN ASSISTANT

## 2023-06-21 PROCEDURE — 94640 AIRWAY INHALATION TREATMENT: CPT | Mod: XB

## 2023-06-21 RX ORDER — PANTOPRAZOLE SODIUM 40 MG/1
40 TABLET, DELAYED RELEASE ORAL DAILY
Status: DISCONTINUED | OUTPATIENT
Start: 2023-06-21 | End: 2023-06-21

## 2023-06-21 RX ORDER — FLUCONAZOLE 100 MG/1
200 TABLET ORAL DAILY
Status: DISCONTINUED | OUTPATIENT
Start: 2023-06-22 | End: 2023-06-21

## 2023-06-21 RX ORDER — FUROSEMIDE 40 MG/1
40 TABLET ORAL DAILY
Status: DISCONTINUED | OUTPATIENT
Start: 2023-06-21 | End: 2023-06-21

## 2023-06-21 RX ORDER — FUROSEMIDE 10 MG/ML
40 INJECTION INTRAMUSCULAR; INTRAVENOUS 2 TIMES DAILY
Status: DISCONTINUED | OUTPATIENT
Start: 2023-06-21 | End: 2023-06-22

## 2023-06-21 RX ORDER — IPRATROPIUM BROMIDE AND ALBUTEROL SULFATE 2.5; .5 MG/3ML; MG/3ML
3 SOLUTION RESPIRATORY (INHALATION) EVERY 6 HOURS
Status: DISCONTINUED | OUTPATIENT
Start: 2023-06-21 | End: 2023-06-26 | Stop reason: HOSPADM

## 2023-06-21 RX ORDER — OXYCODONE AND ACETAMINOPHEN 5; 325 MG/1; MG/1
1 TABLET ORAL EVERY 8 HOURS PRN
Status: DISCONTINUED | OUTPATIENT
Start: 2023-06-21 | End: 2023-06-26 | Stop reason: HOSPADM

## 2023-06-21 RX ADMIN — PROMETHAZINE HYDROCHLORIDE 12.5 MG: 25 INJECTION INTRAMUSCULAR; INTRAVENOUS at 05:06

## 2023-06-21 RX ADMIN — OXYCODONE AND ACETAMINOPHEN 1 TABLET: 5; 325 TABLET ORAL at 03:06

## 2023-06-21 RX ADMIN — ATORVASTATIN CALCIUM 40 MG: 40 TABLET, FILM COATED ORAL at 08:06

## 2023-06-21 RX ADMIN — FUROSEMIDE 40 MG: 40 TABLET ORAL at 03:06

## 2023-06-21 RX ADMIN — ACETAMINOPHEN 650 MG: 325 TABLET ORAL at 09:06

## 2023-06-21 RX ADMIN — IPRATROPIUM BROMIDE AND ALBUTEROL SULFATE 3 ML: .5; 3 SOLUTION RESPIRATORY (INHALATION) at 11:06

## 2023-06-21 RX ADMIN — CEPHALEXIN 500 MG: 500 CAPSULE ORAL at 08:06

## 2023-06-21 RX ADMIN — FLUCONAZOLE 400 MG: 100 TABLET ORAL at 10:06

## 2023-06-21 RX ADMIN — PANTOPRAZOLE SODIUM 40 MG: 40 TABLET, DELAYED RELEASE ORAL at 03:06

## 2023-06-21 RX ADMIN — FUROSEMIDE 40 MG: 10 INJECTION, SOLUTION INTRAMUSCULAR; INTRAVENOUS at 08:06

## 2023-06-21 RX ADMIN — CEPHALEXIN 500 MG: 500 CAPSULE ORAL at 10:06

## 2023-06-21 RX ADMIN — IPRATROPIUM BROMIDE AND ALBUTEROL SULFATE 3 ML: .5; 3 SOLUTION RESPIRATORY (INHALATION) at 07:06

## 2023-06-21 NOTE — ASSESSMENT & PLAN NOTE
Patient's FSGs are controlled on current medication regimen.  Last A1c reviewed-   Lab Results   Component Value Date    HGBA1C 8.2 (H) 02/28/2023     Most recent fingerstick glucose reviewed-   Recent Labs   Lab 06/20/23  1757 06/20/23  1923 06/20/23  2038   POCTGLUCOSE 164* 147* 159*     Current correctional scale  Low  Maintain anti-hyperglycemic dose as follows-   Antihyperglycemics (From admission, onward)    Start     Stop Route Frequency Ordered    06/20/23 1851  insulin aspart U-100 pen 0-5 Units         -- SubQ Before meals & nightly PRN 06/20/23 1752        Hold Oral hypoglycemics while patient is in the hospital.

## 2023-06-21 NOTE — PROGRESS NOTES
Pharmacist Renal Dose Adjustment Note    Nelsy Marino is a 82 y.o. female being treated with the medication fluconazole    Patient Data:    Vital Signs (Most Recent):  Temp: 98.6 °F (37 °C) (06/21/23 0105)  Pulse: 67 (06/21/23 0800)  Resp: 19 (06/21/23 0800)  BP: (!) 103/46 (06/21/23 0105)  SpO2: (!) 94 % (06/21/23 0800) Vital Signs (72h Range):  Temp:  [97.7 °F (36.5 °C)-98.6 °F (37 °C)]   Pulse:  [65-70]   Resp:  [19-24]   BP: (103-129)/(46-79)   SpO2:  [87 %-97 %]      Recent Labs   Lab 06/20/23  1641 06/21/23  0518   CREATININE 1.8* 2.0*     Serum creatinine: 2 mg/dL (H) 06/21/23 0518  Estimated creatinine clearance: 19.5 mL/min (A)    Medication:fluconazole 400 mg every 24 hours will be changed to fluconazole 200 mg every 24 hours    Pharmacist's Name: Azar Zelaya Jr  Pharmacist's Extension: [phone]

## 2023-06-21 NOTE — PROGRESS NOTES
[facility] Medicine  Progress Note    Patient Name: Nelsy Marino  MRN: [id number]  Patient Class: OP- Observation   Admission Date: 6/20/2023  Length of Stay: 0 days  Attending Physician: Angel Chinchilla MD  Primary Care Provider: [facility]        Subjective:     Principal Problem:Acute on chronic diastolic heart failure        HPI:  Nelsy Marino 82 y.o. female with HFpEF, HTN, PAD S/P Fem/fem bypass on chronic suppressive abx presents to the hospital with a chief complaint of SOB.  She reports she developed shortness of breath last night that she attempted treatment for at home her home oxygen without improvement.  After she began wearing her nasal cannula she developed epistaxis which has resolved without intervention.  She continues to feel short of breath.  She reports she is not on a diuretic outpatient.  She and her  deny any salty food intake.  She denies fever chest pain nausea vomiting abdominal pain melena hematuria hematemesis productive and sick contacts.    In the ED, hypoxic to 87% on room air chest x-ray with pulmonary edema and bilateral pleural effusions BNP at 906 troponin negative lactic acid negative COVID negative flu negative creatinine 1.8 afebrile without leukocytosis.      Overview/Hospital Course:  Admission to observation for acute on chronic diastolic heart failure. IV lasix 40 mg started with mild improvement in symptoms and continue to require oxygen supplementation. Noted LOLY worsen also. Renal US showed bilateral cortical thinning, no mass, stone or hydronephrosis.   Patient have chronic left foot pain however due to history of DVT, US BLE which showed no DVT.   Will place wound care orders for chronic left groin dime size open wound, chronic left heel unstagable pressure injury, and right TMA. Wound Care consult.   Patient does have home oxygen but stop using few months ago. No dx of COPD and quit smoking 4 years ago. Obtain CT chest without  contrast and repeat RFP now.       Addendum 2200   CT chest review pulmonary edema and bilateral effusion R>L  No hypotension or sepsis or recent contrast  sCr1.9 stable  UA no evidence of infection however RBC >100 and hyaline casts   UPCR 0.10  Despite IV lasix UPO  350ml in approx 24 hrs.   Add CPK   Hold diflucan  Nephrology consult for oliguric LOLY            Interval History: Breathing 50% better. 91-94% on 3 L NC. Have not been up due to foot wounds and PAD.    Review of Systems   Constitutional:  Negative for chills and fever.   HENT:  Positive for nosebleeds. Negative for tinnitus.    Eyes:  Negative for photophobia and visual disturbance.   Respiratory:  Positive for shortness of breath. Negative for cough, chest tightness and wheezing.    Cardiovascular:  Negative for chest pain, palpitations and leg swelling.   Gastrointestinal:  Negative for abdominal distention, nausea and vomiting.   Genitourinary:  Negative for dysuria, flank pain and hematuria.   Musculoskeletal:  Negative for gait problem and joint swelling.   Skin:  Positive for wound. Negative for rash.   Neurological:  Negative for seizures and syncope.   Objective:     Vital Signs (Most Recent):  Temp: 98.6 °F (37 °C) (06/21/23 0105)  Pulse: 67 (06/21/23 0800)  Resp: 19 (06/21/23 0800)  BP: (!) 103/46 (06/21/23 0105)  SpO2: (!) 94 % (06/21/23 0800) Vital Signs (24h Range):  Temp:  [97.7 °F (36.5 °C)-98.6 °F (37 °C)] 98.6 °F (37 °C)  Pulse:  [65-70] 67  Resp:  [19-24] 19  SpO2:  [87 %-97 %] 94 %  BP: (103-129)/(46-79) 103/46     Weight: 61.4 kg (135 lb 5.8 oz)  Body mass index is 22.53 kg/m².  No intake or output data in the 24 hours ending 06/21/23 1144      Physical Exam  Vitals and nursing note reviewed.   Constitutional:       General: She is not in acute distress.     Appearance: She is well-developed. She is not diaphoretic.   HENT:      Nose:      Comments: No active epistaxis  Neck:      Thyroid: No thyromegaly.   Cardiovascular:       Rate and Rhythm: Normal rate and regular rhythm.      Heart sounds: No murmur heard.  Pulmonary:      Effort: Pulmonary effort is normal. No respiratory distress.      Breath sounds: No wheezing or rales.      Comments: On supplemental oxygen speaking in full sentences   Diminished at bases, inspiratory crackles mid   Chest:      Chest wall: No tenderness.   Abdominal:      General: Bowel sounds are normal. There is no distension.      Palpations: Abdomen is soft. There is no mass.      Tenderness: There is no abdominal tenderness.   Musculoskeletal:         General: No deformity.      Cervical back: Normal range of motion and neck supple.   Skin:     General: Skin is warm.      Comments: 1.  left groin dime size open wound, healthy pink tissue without sign of infection  2.  left heel unstagable pressure injury  3.  right TMA incision scabbing over      Neurological:      Mental Status: She is alert and oriented to person, place, and time. Mental status is at baseline.      Sensory: No sensory deficit.      Comments: Generalized weakness. Wheelchair bound due foot wounds           Significant Labs: All pertinent labs within the past 24 hours have been reviewed.    Significant Imaging: I have reviewed all pertinent imaging results/findings within the past 24 hours.      Assessment/Plan:      * Acute on chronic diastolic heart failure  Echo 3/2023 with EF of 65% and grade 2 DD. With elevated BNP, pulmonary edema and effusions on x-ray. Not on diuretic outpt.   Daily weights/strict intake and output  Wean supplemental oxygen  Home coreg held as BP on lower limits of normal  Mild improvement with lasix overnight Possible underlying COPD?   Add duoneb  Renal function worsen, will repeat RFP and obtain CT chest     Addendum 2200   CT chest review pulmonary edema and bilateral effusion R>L  No hypotension or sepsis or recent contrast  sCr1.9 stable  UA no evidence of infection however RBC >100 and hyaline casts   UPCR  0.10  Despite IV lasix UPO  350ml in approx 24 hrs.   Add CPK   Hold diflucan  Nephrology consult for oliguric LOLY    LOLY (acute kidney injury)  Presents with Cr of 1.8. baseline of 0.9. with signs of volume overload on admission  6/21nt does have home oxygen but stop using few months ago. No dx of COPD and quit smoking 4 years ago. Obtain CT chest without contrast and repeat RFP now.   LOLY 1.8>2.0  Renal US showed bilateral cortical thinning, no mass, stone or hydronephrosis.   Check UA/urine protein/creatinine ratio as previous UA with proteinuria  Hold lasix pending RFP and CT chest  If no improvement, consult Nephrology      Addendum 2200   CT chest review pulmonary edema and bilateral effusion R>L  No hypotension or sepsis or recent contrast  sCr1.9 stable  UA no evidence of infection however RBC >100 and hyaline casts   UPCR 0.10  Despite IV lasix UPO  350ml in approx 24 hrs.   Add CPK   Hold diflucan  Nephrology consult for oliguric LOLY        Wounds, multiple  PTA, HH wound care twice a week    1. left groin dime size open wound-cleanse with vashe, then apply Aquacel Ag and cover with Mepilex 4x4  2. chronic left heel unstagable pressure injury-cleanse with vashe and cover with mepilex 6x6  3.  Right TMA -cleanse with vashe, apply betadine cover with dry gauze secure with Kerlix and ACE  Green boot to off load BLE  Wound care team consult    Hypoxia  Hypoxic to 87% on RA on arrival, reports has PRN home oxygen. Chest x-ray with pulmonary findings suggesting edema and small pleural effusion  Started on diuresis  Wean supplemental oxygen  Seen above #1    Hypokalemia  Potassium of 3.2 on arrival. Will replete oral and check Mg  Resolved    Anemia  Presents with hemoglobin of 8.3. previous baseline near 9. 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  Last A1c reviewed-   Lab Results   Component Value Date    HGBA1C 8.2 (H) 02/28/2023     Most recent fingerstick glucose reviewed-   Recent Labs   Lab 06/20/23 1757 06/20/23 1923 06/20/23 2038   POCTGLUCOSE 164* 147* 159*     Current correctional scale  Low  Maintain anti-hyperglycemic dose as follows-   Antihyperglycemics (From admission, onward)      Start     Stop Route Frequency Ordered    06/20/23 1851  insulin aspart U-100 pen 0-5 Units         -- SubQ Before meals & nightly PRN 06/20/23 1752          Hold Oral hypoglycemics while patient is in the hospital.    Essential hypertension  BP well controlled. Resume home coreg/hydralazine if BP elevates    PAD (peripheral artery disease)  S/P fem/fem bypass 3/3023. Did not tolerate aspirin/plavix to due GI bleeding per chart review  contineu home statin. 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   Continue home keflex and fluconazole (QTc 473 today ranges 415-453)      VTE Risk Mitigation (From admission, onward)           Ordered     Place GABE hose  Until discontinued         06/20/23 1839     IP VTE HIGH RISK PATIENT  Once         06/20/23 1749     Place sequential compression device  Until discontinued         06/20/23 1749                    Discharge Planning   JUAN ALBERTO:      Code Status: Full Code   Is the patient medically ready for discharge?:     Reason for patient still in hospital (select all that apply): Treatment  Discharge Plan A: Home with family          Leeann Garza NP  Department of Hospital Medicine   [facility]

## 2023-06-21 NOTE — NURSING
[facility] [place]  Nurses Note -- 4 Eyes      6/21/2023       Skin assessed on: Q Shift      [] No Pressure Injuries Present    []Prevention Measures Documented    [x] Yes LDA  for Pressure Injury Previously documented     [] Yes New Pressure Injury Discovered   [] LDA for New Pressure Injury Added      Attending RN:  Stacey Hillman, RN     Second RN:  Suzi SKELTON RN     Patient to US via bed, 3L O2 nc in use. Green offloading boots applied to both lower extremities.NAD noted

## 2023-06-21 NOTE — ASSESSMENT & PLAN NOTE
Presents with hemoglobin of 8.3. previous baseline near 9. 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

## 2023-06-21 NOTE — PLAN OF CARE
Case Management Assessment     PCP: Juan F   Pharmacy: [facility]    Patient Arrived From: home  Existing Help at Home: spouse    Barriers to Discharge: none    Discharge Plan:    A. Home with family   B. Home with family      SW completed brief assessment and discussed discharge planning with patient and her  at her bedside. Patient lives with her  who is her main support. Patient's  will provide transportation for her to get home when discharge from the hospital.       06/20/23 2042   Discharge Planning   Assessment Type Discharge Planning Brief Assessment   Resource/Environmental Concerns none   Support Systems Spouse/significant other   Equipment Currently Used at Home wheelchair;bedside commode;shower chair;hospital bed;walker, rolling   Current Living Arrangements home   Patient/Family Anticipates Transition to home with family   Patient/Family Anticipated Services at Transition none   DME Needed Upon Discharge  none   Discharge Plan A Home with family   Discharge Plan B Home with family

## 2023-06-21 NOTE — CONSULTS
Food & Nutrition  Education    Diet Education: Low Salt Diet - Fluid Restrictive diet  Time Spent: 15 min  Learners: Nelsy Marino      Nutrition Education provided with handouts: Low Salt Diet- Fluid Restrictive Diet      Comments: RD consult to educate pt on fluid restrictive low salt diet. Pt educated with family member in the room. Educations left with patient and attached to discharge paperwork      All questions and concerns answered. Dietitian's contact information provided.       Please Re-consult as needed        Thanks!   Liza Carlisle, Registration Eligible, Provisional LDN

## 2023-06-21 NOTE — ASSESSMENT & PLAN NOTE
Echo 3/2023 with EF of 65% and grade 2 DD. With elevated BNP, pulmonary edema and effusions on x-ray. Not on diuretic outpt.   Daily weights/strict intake and output  Wean supplemental oxygen  Home coreg held as BP on lower limits of normal  Mild improvement with lasix overnight Possible underlying COPD?   Add duoneb  Renal function worsen, will repeat RFP and obtain CT chest

## 2023-06-21 NOTE — ASSESSMENT & PLAN NOTE
PTA, HH wound care twice a week    1. left groin dime size open wound-cleanse with vashe, then apply Aquacel Ag and cover with Mepilex 4x4  2. chronic left heel unstagable pressure injury-cleanse with vashe and cover with mepilex 6x6  3.  Right TMA -cleanse with vashe, apply betadine cover with dry gauze secure with Kerlix and ACE  Green boot to off load BLE  Wound care team consult

## 2023-06-21 NOTE — NURSING
Patient returned to room from US via bed.  Patient awake, alert, oriented with c/o pain 5/10 to LLE. 3L O2 nc in use. Tele monitoring in progress.  and brother at bedside. No apparent distress noted at this time.

## 2023-06-21 NOTE — SUBJECTIVE & OBJECTIVE
Interval History: Breathing 50% better. 91-94% on 3 L NC. Have not been up due to foot wounds and PAD.    Review of Systems   Constitutional:  Negative for chills and fever.   HENT:  Positive for nosebleeds. Negative for tinnitus.    Eyes:  Negative for photophobia and visual disturbance.   Respiratory:  Positive for shortness of breath. Negative for cough, chest tightness and wheezing.    Cardiovascular:  Negative for chest pain, palpitations and leg swelling.   Gastrointestinal:  Negative for abdominal distention, nausea and vomiting.   Genitourinary:  Negative for dysuria, flank pain and hematuria.   Musculoskeletal:  Negative for gait problem and joint swelling.   Skin:  Positive for wound. Negative for rash.   Neurological:  Negative for seizures and syncope.   Objective:     Vital Signs (Most Recent):  Temp: 98.6 °F (37 °C) (06/21/23 0105)  Pulse: 67 (06/21/23 0800)  Resp: 19 (06/21/23 0800)  BP: (!) 103/46 (06/21/23 0105)  SpO2: (!) 94 % (06/21/23 0800) Vital Signs (24h Range):  Temp:  [97.7 °F (36.5 °C)-98.6 °F (37 °C)] 98.6 °F (37 °C)  Pulse:  [65-70] 67  Resp:  [19-24] 19  SpO2:  [87 %-97 %] 94 %  BP: (103-129)/(46-79) 103/46     Weight: 61.4 kg (135 lb 5.8 oz)  Body mass index is 22.53 kg/m².  No intake or output data in the 24 hours ending 06/21/23 1144      Physical Exam  Vitals and nursing note reviewed.   Constitutional:       General: She is not in acute distress.     Appearance: She is well-developed. She is not diaphoretic.   HENT:      Nose:      Comments: No active epistaxis  Neck:      Thyroid: No thyromegaly.   Cardiovascular:      Rate and Rhythm: Normal rate and regular rhythm.      Heart sounds: No murmur heard.  Pulmonary:      Effort: Pulmonary effort is normal. No respiratory distress.      Breath sounds: No wheezing or rales.      Comments: On supplemental oxygen speaking in full sentences   Diminished at bases, inspiratory crackles mid   Chest:      Chest wall: No tenderness.    Abdominal:      General: Bowel sounds are normal. There is no distension.      Palpations: Abdomen is soft. There is no mass.      Tenderness: There is no abdominal tenderness.   Musculoskeletal:         General: No deformity.      Cervical back: Normal range of motion and neck supple.   Skin:     General: Skin is warm.      Comments: 1.  left groin dime size open wound, healthy pink tissue without sign of infection  2.  left heel unstagable pressure injury  3.  right TMA incision scabbing over      Neurological:      Mental Status: She is alert and oriented to person, place, and time. Mental status is at baseline.      Sensory: No sensory deficit.      Comments: Generalized weakness. Wheelchair bound due foot wounds           Significant Labs: All pertinent labs within the past 24 hours have been reviewed.    Significant Imaging: I have reviewed all pertinent imaging results/findings within the past 24 hours.

## 2023-06-21 NOTE — ASSESSMENT & PLAN NOTE
Hypoxic to 87% on RA on arrival, reports has PRN home oxygen. Chest x-ray with pulmonary findings suggesting edema and small pleural effusion  Started on diuresis  Wean supplemental oxygen  Seen above #1

## 2023-06-21 NOTE — ASSESSMENT & PLAN NOTE
Presents with Cr of 1.8. baseline of 0.9. with signs of volume overload on admission  6/21nt does have home oxygen but stop using few months ago. No dx of COPD and quit smoking 4 years ago. Obtain CT chest without contrast and repeat RFP now.   LOLY 1.8>2.0  Renal US showed bilateral cortical thinning, no mass, stone or hydronephrosis.   Check UA/urine protein/creatinine ratio as previous UA with proteinuria  Hold lasix pending RFP and CT chest  If no improvement, consult Nephrology

## 2023-06-21 NOTE — PLAN OF CARE
Problem: Adult Inpatient Plan of Care  Goal: Plan of Care Review  Outcome: Ongoing, Progressing  Goal: Patient-Specific Goal (Individualized)  Outcome: Ongoing, Progressing  Goal: Absence of Hospital-Acquired Illness or Injury  Outcome: Ongoing, Progressing  Intervention: Identify and Manage Fall Risk  Flowsheets (Taken 6/21/2023 0458)  Safety Promotion/Fall Prevention:   assistive device/personal item within reach   side rails raised x 2  Intervention: Prevent Skin Injury  Flowsheets (Taken 6/21/2023 0458)  Body Position:   position changed independently   position maintained  Skin Protection: adhesive use limited  Intervention: Prevent and Manage VTE (Venous Thromboembolism) Risk  Flowsheets (Taken 6/21/2023 0458)  Activity Management: Arm raise - L1  Intervention: Prevent Infection  Flowsheets (Taken 6/21/2023 0458)  Infection Prevention: single patient room provided  Goal: Optimal Comfort and Wellbeing  Outcome: Ongoing, Progressing  Intervention: Monitor Pain and Promote Comfort  Flowsheets (Taken 6/21/2023 0458)  Pain Management Interventions:   quiet environment facilitated   relaxation techniques promoted  Intervention: Provide Person-Centered Care  Flowsheets (Taken 6/21/2023 0458)  Trust Relationship/Rapport:   care explained   choices provided   emotional support provided   empathic listening provided   questions answered  Goal: Readiness for Transition of Care  Outcome: Ongoing, Progressing     Problem: Skin Injury Risk Increased  Goal: Skin Health and Integrity  Outcome: Ongoing, Progressing  Intervention: Optimize Skin Protection  Flowsheets (Taken 6/21/2023 0458)  Skin Protection: adhesive use limited  Head of Bed (HOB) Positioning: HOB elevated     Problem: Diabetes Comorbidity  Goal: Blood Glucose Level Within Targeted Range  Outcome: Ongoing, Progressing  Intervention: Monitor and Manage Glycemia  Flowsheets (Taken 6/21/2023 0458)  Glycemic Management: blood glucose monitored

## 2023-06-21 NOTE — HOSPITAL COURSE
Admitted for decompensated HFpEF and LOLY. Initial response to diuretics poor, but with more aggressive diuresis pt was weaned from oxygen and her LOLY resolved. Hospital course complicated by urinary retention due to constipation, both of which resolved prior to dc. Discharged w/ home health/HF monitoring, and will follow up w/ PCP.

## 2023-06-22 PROBLEM — J96.01 ACUTE HYPOXEMIC RESPIRATORY FAILURE: Status: ACTIVE | Noted: 2023-06-20

## 2023-06-22 LAB
ANION GAP SERPL CALC-SCNC: 15 MMOL/L (ref 8–16)
BASOPHILS # BLD AUTO: 0.02 K/UL (ref 0–0.2)
BASOPHILS NFR BLD: 0.2 % (ref 0–1.9)
BUN SERPL-MCNC: 43 MG/DL (ref 8–23)
CALCIUM SERPL-MCNC: 8.6 MG/DL (ref 8.7–10.5)
CHLORIDE SERPL-SCNC: 101 MMOL/L (ref 95–110)
CO2 SERPL-SCNC: 24 MMOL/L (ref 23–29)
CREAT SERPL-MCNC: 2.4 MG/DL (ref 0.5–1.4)
DIFFERENTIAL METHOD: ABNORMAL
EOSINOPHIL # BLD AUTO: 0.3 K/UL (ref 0–0.5)
EOSINOPHIL NFR BLD: 3.2 % (ref 0–8)
ERYTHROCYTE [DISTWIDTH] IN BLOOD BY AUTOMATED COUNT: 14.9 % (ref 11.5–14.5)
EST. GFR  (NO RACE VARIABLE): 20 ML/MIN/1.73 M^2
FERRITIN SERPL-MCNC: 239 NG/ML (ref 20–300)
GLUCOSE SERPL-MCNC: 133 MG/DL (ref 70–110)
HCT VFR BLD AUTO: 26.1 % (ref 37–48.5)
HGB BLD-MCNC: 7.9 G/DL (ref 12–16)
IMM GRANULOCYTES # BLD AUTO: 0.03 K/UL (ref 0–0.04)
IMM GRANULOCYTES NFR BLD AUTO: 0.4 % (ref 0–0.5)
IRON SERPL-MCNC: 14 UG/DL (ref 30–160)
LYMPHOCYTES # BLD AUTO: 1.2 K/UL (ref 1–4.8)
LYMPHOCYTES NFR BLD: 14.4 % (ref 18–48)
MCH RBC QN AUTO: 25.1 PG (ref 27–31)
MCHC RBC AUTO-ENTMCNC: 30.3 G/DL (ref 32–36)
MCV RBC AUTO: 83 FL (ref 82–98)
MONOCYTES # BLD AUTO: 0.5 K/UL (ref 0.3–1)
MONOCYTES NFR BLD: 5.7 % (ref 4–15)
NEUTROPHILS # BLD AUTO: 6.2 K/UL (ref 1.8–7.7)
NEUTROPHILS NFR BLD: 76.1 % (ref 38–73)
NRBC BLD-RTO: 0 /100 WBC
PLATELET # BLD AUTO: 400 K/UL (ref 150–450)
PMV BLD AUTO: 10.1 FL (ref 9.2–12.9)
POCT GLUCOSE: 115 MG/DL (ref 70–110)
POCT GLUCOSE: 132 MG/DL (ref 70–110)
POCT GLUCOSE: 179 MG/DL (ref 70–110)
POCT GLUCOSE: 201 MG/DL (ref 70–110)
POTASSIUM SERPL-SCNC: 3.7 MMOL/L (ref 3.5–5.1)
RBC # BLD AUTO: 3.15 M/UL (ref 4–5.4)
SATURATED IRON: 7 % (ref 20–50)
SODIUM SERPL-SCNC: 140 MMOL/L (ref 136–145)
TOTAL IRON BINDING CAPACITY: 201 UG/DL (ref 250–450)
TRANSFERRIN SERPL-MCNC: 136 MG/DL (ref 200–375)
WBC # BLD AUTO: 8.13 K/UL (ref 3.9–12.7)

## 2023-06-22 PROCEDURE — 94761 N-INVAS EAR/PLS OXIMETRY MLT: CPT

## 2023-06-22 PROCEDURE — 94799 UNLISTED PULMONARY SVC/PX: CPT

## 2023-06-22 PROCEDURE — 82728 ASSAY OF FERRITIN: CPT | Performed by: INTERNAL MEDICINE

## 2023-06-22 PROCEDURE — 63600175 PHARM REV CODE 636 W HCPCS: Performed by: PHYSICIAN ASSISTANT

## 2023-06-22 PROCEDURE — 63600175 PHARM REV CODE 636 W HCPCS: Performed by: NURSE PRACTITIONER

## 2023-06-22 PROCEDURE — 94760 N-INVAS EAR/PLS OXIMETRY 1: CPT

## 2023-06-22 PROCEDURE — 25000003 PHARM REV CODE 250: Performed by: NURSE PRACTITIONER

## 2023-06-22 PROCEDURE — 27000221 HC OXYGEN, UP TO 24 HOURS

## 2023-06-22 PROCEDURE — 85025 COMPLETE CBC W/AUTO DIFF WBC: CPT | Performed by: NURSE PRACTITIONER

## 2023-06-22 PROCEDURE — 36415 COLL VENOUS BLD VENIPUNCTURE: CPT | Performed by: NURSE PRACTITIONER

## 2023-06-22 PROCEDURE — 25000003 PHARM REV CODE 250: Performed by: PHYSICIAN ASSISTANT

## 2023-06-22 PROCEDURE — 84466 ASSAY OF TRANSFERRIN: CPT | Performed by: INTERNAL MEDICINE

## 2023-06-22 PROCEDURE — 25000242 PHARM REV CODE 250 ALT 637 W/ HCPCS: Performed by: NURSE PRACTITIONER

## 2023-06-22 PROCEDURE — 25000003 PHARM REV CODE 250: Performed by: INTERNAL MEDICINE

## 2023-06-22 PROCEDURE — 21400001 HC TELEMETRY ROOM

## 2023-06-22 PROCEDURE — 36415 COLL VENOUS BLD VENIPUNCTURE: CPT | Performed by: INTERNAL MEDICINE

## 2023-06-22 PROCEDURE — 80048 BASIC METABOLIC PNL TOTAL CA: CPT | Performed by: PHYSICIAN ASSISTANT

## 2023-06-22 PROCEDURE — 94640 AIRWAY INHALATION TREATMENT: CPT

## 2023-06-22 RX ORDER — BUMETANIDE 0.25 MG/ML
1 INJECTION INTRAMUSCULAR; INTRAVENOUS DAILY
Status: DISCONTINUED | OUTPATIENT
Start: 2023-06-22 | End: 2023-06-24

## 2023-06-22 RX ADMIN — BUMETANIDE 1 MG: 0.25 INJECTION INTRAMUSCULAR; INTRAVENOUS at 05:06

## 2023-06-22 RX ADMIN — FUROSEMIDE 40 MG: 10 INJECTION, SOLUTION INTRAMUSCULAR; INTRAVENOUS at 08:06

## 2023-06-22 RX ADMIN — CEPHALEXIN 500 MG: 500 CAPSULE ORAL at 09:06

## 2023-06-22 RX ADMIN — IPRATROPIUM BROMIDE AND ALBUTEROL SULFATE 3 ML: .5; 3 SOLUTION RESPIRATORY (INHALATION) at 01:06

## 2023-06-22 RX ADMIN — IPRATROPIUM BROMIDE AND ALBUTEROL SULFATE 3 ML: .5; 3 SOLUTION RESPIRATORY (INHALATION) at 07:06

## 2023-06-22 RX ADMIN — PROMETHAZINE HYDROCHLORIDE 12.5 MG: 25 INJECTION INTRAMUSCULAR; INTRAVENOUS at 11:06

## 2023-06-22 RX ADMIN — CEPHALEXIN 500 MG: 500 CAPSULE ORAL at 08:06

## 2023-06-22 RX ADMIN — INSULIN ASPART 2 UNITS: 100 INJECTION, SOLUTION INTRAVENOUS; SUBCUTANEOUS at 05:06

## 2023-06-22 RX ADMIN — ATORVASTATIN CALCIUM 40 MG: 40 TABLET, FILM COATED ORAL at 09:06

## 2023-06-22 RX ADMIN — OXYCODONE AND ACETAMINOPHEN 1 TABLET: 5; 325 TABLET ORAL at 09:06

## 2023-06-22 RX ADMIN — OXYCODONE AND ACETAMINOPHEN 1 TABLET: 5; 325 TABLET ORAL at 05:06

## 2023-06-22 NOTE — PROGRESS NOTES
[facility] Medicine  Progress Note    Patient Name: Nelsy Marino  MRN: [id number]  Patient Class: OP- Observation   Admission Date: 6/20/2023  Length of Stay: 0 days  Attending Physician: Angel Chinchilla MD  Primary Care Provider: [facility]        Subjective:     Principal Problem:Acute on chronic diastolic heart failure        HPI:  Nelsy Mraino 82 y.o. female with HFpEF, HTN, PAD S/P Fem/fem bypass on chronic suppressive abx presents to the hospital with a chief complaint of SOB.  She reports she developed shortness of breath last night that she attempted treatment for at home her home oxygen without improvement.  After she began wearing her nasal cannula she developed epistaxis which has resolved without intervention.  She continues to feel short of breath.  She reports she is not on a diuretic outpatient.  She and her  deny any salty food intake.  She denies fever chest pain nausea vomiting abdominal pain melena hematuria hematemesis productive and sick contacts.    In the ED, hypoxic to 87% on room air chest x-ray with pulmonary edema and bilateral pleural effusions BNP at 906 troponin negative lactic acid negative COVID negative flu negative creatinine 1.8 afebrile without leukocytosis.      Overview/Hospital Course:  Admission to observation for acute on chronic diastolic heart failure. IV lasix 40 mg started with mild improvement in symptoms and continue to require oxygen supplementation. Noted LOLY worsen also. Renal US showed bilateral cortical thinning, no mass, stone or hydronephrosis. Patient have chronic left foot pain however due to history of DVT, US BLE which showed no DVT.   Will place wound care orders for chronic left groin dime size open wound, chronic left heel unstagable pressure injury, and right TMA. Wound Care consulted to dress and treat wounds.  Patient does have home oxygen but stop using few months ago. No dx of COPD and quit smoking 4 years ago.  Obtain CT chest without contrast and repeat RFP now. Despite IV diuresis, urine output noted to be 350 mL in 24 hours. Nephrology consulted for further recommendations on management of oliguric LOLY.      Interval History: LEANDRA; endorses that's she feels better but continues to endorse SOB. Desaturation of 88% noted on RA.     Review of Systems   Constitutional:  Negative for chills and fever.   HENT:  Positive for trouble swallowing. Negative for congestion, rhinorrhea, sneezing, sore throat and tinnitus.    Eyes:  Negative for photophobia and visual disturbance.   Respiratory:  Positive for shortness of breath. Negative for cough, chest tightness and wheezing.    Cardiovascular:  Negative for chest pain, palpitations and leg swelling.   Gastrointestinal:  Negative for abdominal distention, constipation, diarrhea, nausea and vomiting.   Genitourinary:  Positive for decreased urine volume. Negative for dysuria, flank pain and hematuria.   Musculoskeletal:  Negative for gait problem and joint swelling.   Skin:  Positive for wound. Negative for rash.   Neurological:  Negative for seizures and syncope.   Psychiatric/Behavioral:  Negative for agitation and behavioral problems.    Objective:     Vital Signs (Most Recent):  Temp: 98 °F (36.7 °C) (06/22/23 0751)  Pulse: 70 (06/22/23 0751)  Resp: 18 (06/22/23 0751)  BP: (!) 108/52 (06/22/23 0751)  SpO2: (!) 88 % (06/22/23 0751) Vital Signs (24h Range):  Temp:  [97.9 °F (36.6 °C)-98.6 °F (37 °C)] 98 °F (36.7 °C)  Pulse:  [70-78] 70  Resp:  [16-22] 18  SpO2:  [88 %-95 %] 88 %  BP: (102-123)/(48-56) 108/52     Weight: 62.5 kg (137 lb 12.6 oz)  Body mass index is 22.93 kg/m².    Intake/Output Summary (Last 24 hours) at 6/22/2023 1123  Last data filed at 6/22/2023 1000  Gross per 24 hour   Intake 360 ml   Output --   Net 360 ml         Physical Exam  Vitals and nursing note reviewed.   Constitutional:       General: She is not in acute distress.     Appearance: She is  well-developed. She is not diaphoretic.   HENT:      Head: Normocephalic and atraumatic.      Right Ear: External ear normal.      Left Ear: External ear normal.      Nose: Nose normal.      Comments: No active epistaxis     Mouth/Throat:      Mouth: Mucous membranes are moist.      Pharynx: Oropharynx is clear. No oropharyngeal exudate.   Eyes:      General:         Right eye: No discharge.         Left eye: No discharge.      Extraocular Movements: Extraocular movements intact.      Pupils: Pupils are equal, round, and reactive to light.   Neck:      Thyroid: No thyromegaly.   Cardiovascular:      Rate and Rhythm: Normal rate and regular rhythm.      Heart sounds: No murmur heard.     Comments: Admission EKG reviewed and noted to show NSR at 67 bpm.  Pulmonary:      Effort: Accessory muscle usage present. No respiratory distress.      Breath sounds: No wheezing or rales.      Comments: Desaturation noted to 88% on RA.  Chest:      Chest wall: No tenderness.   Abdominal:      General: Bowel sounds are normal. There is no distension.      Palpations: Abdomen is soft. There is no mass.      Tenderness: There is no abdominal tenderness.   Musculoskeletal:         General: No deformity.      Cervical back: Normal range of motion and neck supple.   Skin:     General: Skin is warm.      Comments: Left groin dime size open wound, healthy pink tissue without sign of infection, left heel unstagable pressure injury and right TMA incision    Neurological:      Mental Status: She is alert and oriented to person, place, and time. Mental status is at baseline.      Sensory: No sensory deficit.      Comments: Generalized weakness. Wheelchair bound due foot wounds           Significant Labs: All pertinent labs within the past 24 hours have been reviewed.  BMP:   Recent Labs   Lab 06/20/23  1852 06/21/23  0518 06/22/23  0941   GLU  --    < > 133*   NA  --    < > 140   K  --    < > 3.7   CL  --    < > 101   CO2  --    < > 24   BUN   --    < > 43*   CREATININE  --    < > 2.4*   CALCIUM  --    < > 8.6*   MG 1.9  --   --     < > = values in this interval not displayed.     CBC:   Recent Labs   Lab 06/20/23  1641 06/21/23  0931 06/22/23  0942   WBC 9.78 9.85 8.13   HGB 8.3* 8.3* 7.9*   HCT 26.0* 27.0* 26.1*    400 400     CMP:   Recent Labs   Lab 06/20/23  1641 06/21/23  0518 06/21/23  1325 06/22/23  0941    139 140 140   K 3.2* 3.7 3.5 3.7    102 101 101   CO2 27 23 24 24   * 129* 122* 133*   BUN 32* 34* 38* 43*   CREATININE 1.8* 2.0* 1.9* 2.4*   CALCIUM 8.8 8.8 8.7 8.6*   PROT 6.9  --   --   --    ALBUMIN 2.5*  --   --   --    BILITOT 0.5  --   --   --    ALKPHOS 83  --   --   --    AST 23  --   --   --    ALT 9*  --   --   --    ANIONGAP 12 14 15 15     Cardiac Markers:   Recent Labs   Lab 06/20/23  1641   *     Coagulation:   Recent Labs   Lab 06/20/23  1641   INR 1.2     Lactic Acid:   Recent Labs   Lab 06/20/23  1749   LACTATE 1.1     Magnesium:   Recent Labs   Lab 06/20/23  1852   MG 1.9     POCT Glucose:   Recent Labs   Lab 06/21/23  1644 06/21/23  1950 06/22/23  0749   POCTGLUCOSE 156* 130* 115*     Troponin:   Recent Labs   Lab 06/20/23  1641 06/20/23  2247 06/21/23  0518   TROPONINI 0.013 0.010 0.007     TSH:   Recent Labs   Lab 04/22/23  1619   TSH 1.304     Urine Studies:   Recent Labs   Lab 06/21/23  1547   COLORU Yellow   APPEARANCEUA Hazy*   PHUR 5.0   SPECGRAV 1.030   PROTEINUA 1+*   GLUCUA Negative   KETONESU 1+*   BILIRUBINUA Negative   OCCULTUA Trace*   NITRITE Negative   UROBILINOGEN 4.0-6.0*   LEUKOCYTESUR Negative   RBCUA >100*   WBCUA 5   BACTERIA Rare   HYALINECASTS 34*       Significant Imaging: I have reviewed all pertinent imaging results/findings within the past 24 hours.      Assessment/Plan:      * Acute on chronic diastolic heart failure  Echo 3/2023 with EF of 65% and grade 2 DD. With elevated BNP, pulmonary edema and effusions on x-ray. Not on diuretic outpt.   Daily weights/strict  intake and output  Attempted to Wean supplemental oxygen and desaturation of 88% on RA noted; continue supplemental oxygenation as needed.  Home coreg held as BP on lower limits of normal  Mild improvement with lasix overnight Possible underlying COPD; add duoneb  Renal function worsen; nephrology consulted for further recommendations of oliguric LOLY (cardiorenal syndrome?)    Wounds, multiple  PTA, HH wound care twice a week    1. left groin dime size open wound-cleanse with vashe, then apply Aquacel Ag and cover with Mepilex 4x4  2. chronic left heel unstagable pressure injury-cleanse with vashe and cover with mepilex 6x6  3.  Right TMA -cleanse with vashe, apply betadine cover with dry gauze secure with Kerlix and ACE  Green boot to off load BLE  Wound care consulted    Acute hypoxemic respiratory failure  Hypoxic to 87% on RA on arrival, reports has PRN home oxygen. Chest x-ray with pulmonary findings suggesting edema and small pleural effusion  Started on diuresis  Attempted to wean supplemental oxygen, with desaturation of 88% noted on RA.  Seen above #1    Hypokalemia  Potassium of 3.2 on arrival. Will replete oral and check Mg  Resolved    Debility  Uses wheelchair at baseline, due to foot wounds.    LOLY (acute kidney injury)  Presents with Cr of 1.8. baseline of 0.9. with signs of volume overload on admission  6/21nt does have home oxygen but stop using few months ago. No dx of COPD and quit smoking 4 years ago. Obtain CT chest without contrast and repeat RFP now.   LOLY 1.8>2.0  Renal US showed bilateral cortical thinning, no mass, stone or hydronephrosis.   Check UA/urine protein/creatinine ratio as previous UA with proteinuria  Hold lasix pending RFP and CT chest  If no improvement, consult Nephrology  sCr1.9 stable  UPCR 0.10  Despite IV lasix UPO  350ml in approx 24 hrs  Nephrology consulted for further recommendations of oliguric LOLY; appreciate recommendations.    06/22: Cr noted to be 2.4 on AM  labs.    Anemia  Presents with hemoglobin of 8.3. previous baseline near 9. Denies bleeding no indication for transfusion at this time  History of AVMs no longer on ASA/plavix  Continue to monitor    Type 2 diabetes mellitus with chronic kidney disease, with long-term current use of insulin  Patient's FSGs are controlled on current medication regimen.  Last A1c reviewed-   Lab Results   Component Value Date    HGBA1C 8.2 (H) 02/28/2023     Most recent fingerstick glucose reviewed-   Recent Labs   Lab 06/20/23  1757 06/20/23 1923 06/20/23 2038   POCTGLUCOSE 164* 147* 159*     Current correctional scale  Low  Maintain anti-hyperglycemic dose as follows-   Antihyperglycemics (From admission, onward)    Start     Stop Route Frequency Ordered    06/20/23 1851  insulin aspart U-100 pen 0-5 Units         -- SubQ Before meals & nightly PRN 06/20/23 1752        Hold Oral hypoglycemics while patient is in the hospital.    Essential hypertension  BP well controlled. Resume home coreg/hydralazine if BP elevates    PAD (peripheral artery disease)  S/P fem/fem bypass 3/3023. Did not tolerate aspirin/plavix to due GI bleeding per chart review  contineu home statin. 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   Continue home keflex and fluconazole (QTc 473 today ranges 415-453)      VTE Risk Mitigation (From admission, onward)         Ordered     Place GABE hose  Until discontinued         06/20/23 1839     IP VTE HIGH RISK PATIENT  Once         06/20/23 1749     Place sequential compression device  Until discontinued         06/20/23 1749                Discharge Planning   JUAN ALBERTO:      Code Status: Full Code   Is the patient medically ready for discharge?:     Reason for patient still in hospital (select all that apply): Patient trending condition, Treatment and Consult recommendations  Discharge Plan A: Home with family          Joaquín Moeller PA-C  Department of Hospital Medicine  [facility]  [facility] between [facility]  06/22/2023   Patent

## 2023-06-22 NOTE — ASSESSMENT & PLAN NOTE
PTA, HH wound care twice a week    1. left groin dime size open wound-cleanse with vashe, then apply Aquacel Ag and cover with Mepilex 4x4  2. chronic left heel unstagable pressure injury-cleanse with vashe and cover with mepilex 6x6  3.  Right TMA -cleanse with vashe, apply betadine cover with dry gauze secure with Kerlix and ACE  Green boot to off load BLE  Wound care consulted

## 2023-06-22 NOTE — PLAN OF CARE
Problem: Adult Inpatient Plan of Care  Goal: Plan of Care Review  Outcome: Ongoing, Progressing  Goal: Patient-Specific Goal (Individualized)  Outcome: Ongoing, Progressing  Goal: Absence of Hospital-Acquired Illness or Injury  Outcome: Ongoing, Progressing  Intervention: Identify and Manage Fall Risk  Flowsheets (Taken 6/22/2023 0429)  Safety Promotion/Fall Prevention:   side rails raised x 2   assistive device/personal item within reach  Intervention: Prevent Skin Injury  Flowsheets (Taken 6/22/2023 0429)  Body Position: position changed independently  Skin Protection: adhesive use limited  Intervention: Prevent and Manage VTE (Venous Thromboembolism) Risk  Flowsheets (Taken 6/22/2023 0429)  Activity Management: Arm raise - L1  Intervention: Prevent Infection  Flowsheets (Taken 6/22/2023 0429)  Infection Prevention: single patient room provided  Goal: Optimal Comfort and Wellbeing  Outcome: Ongoing, Progressing  Intervention: Monitor Pain and Promote Comfort  Flowsheets (Taken 6/22/2023 0429)  Pain Management Interventions:   quiet environment facilitated   relaxation techniques promoted  Intervention: Provide Person-Centered Care  Flowsheets (Taken 6/22/2023 0429)  Trust Relationship/Rapport:   care explained   choices provided   emotional support provided   empathic listening provided   questions answered  Goal: Readiness for Transition of Care  Outcome: Ongoing, Progressing     Problem: Skin Injury Risk Increased  Goal: Skin Health and Integrity  Outcome: Ongoing, Progressing  Intervention: Optimize Skin Protection  Flowsheets (Taken 6/22/2023 0429)  Pressure Reduction Techniques: frequent weight shift encouraged  Skin Protection: adhesive use limited  Head of Bed (HOB) Positioning: HOB at 30-45 degrees     Problem: Diabetes Comorbidity  Goal: Blood Glucose Level Within Targeted Range  Outcome: Ongoing, Progressing  Intervention: Monitor and Manage Glycemia  Flowsheets (Taken 6/22/2023 0429)  Glycemic  Management: blood glucose monitored

## 2023-06-22 NOTE — SUBJECTIVE & OBJECTIVE
Interval History: NAEON; endorses that's she feels better but continues to endorse SOB. Desaturation of 88% noted on RA.     Review of Systems   Constitutional:  Negative for chills and fever.   HENT:  Positive for trouble swallowing. Negative for congestion, rhinorrhea, sneezing, sore throat and tinnitus.    Eyes:  Negative for photophobia and visual disturbance.   Respiratory:  Positive for shortness of breath. Negative for cough, chest tightness and wheezing.    Cardiovascular:  Negative for chest pain, palpitations and leg swelling.   Gastrointestinal:  Negative for abdominal distention, constipation, diarrhea, nausea and vomiting.   Genitourinary:  Positive for decreased urine volume. Negative for dysuria, flank pain and hematuria.   Musculoskeletal:  Negative for gait problem and joint swelling.   Skin:  Positive for wound. Negative for rash.   Neurological:  Negative for seizures and syncope.   Psychiatric/Behavioral:  Negative for agitation and behavioral problems.    Objective:     Vital Signs (Most Recent):  Temp: 98 °F (36.7 °C) (06/22/23 0751)  Pulse: 70 (06/22/23 0751)  Resp: 18 (06/22/23 0751)  BP: (!) 108/52 (06/22/23 0751)  SpO2: (!) 88 % (06/22/23 0751) Vital Signs (24h Range):  Temp:  [97.9 °F (36.6 °C)-98.6 °F (37 °C)] 98 °F (36.7 °C)  Pulse:  [70-78] 70  Resp:  [16-22] 18  SpO2:  [88 %-95 %] 88 %  BP: (102-123)/(48-56) 108/52     Weight: 62.5 kg (137 lb 12.6 oz)  Body mass index is 22.93 kg/m².    Intake/Output Summary (Last 24 hours) at 6/22/2023 1123  Last data filed at 6/22/2023 1000  Gross per 24 hour   Intake 360 ml   Output --   Net 360 ml         Physical Exam  Vitals and nursing note reviewed.   Constitutional:       General: She is not in acute distress.     Appearance: She is well-developed. She is not diaphoretic.   HENT:      Head: Normocephalic and atraumatic.      Right Ear: External ear normal.      Left Ear: External ear normal.      Nose: Nose normal.      Comments: No active  epistaxis     Mouth/Throat:      Mouth: Mucous membranes are moist.      Pharynx: Oropharynx is clear. No oropharyngeal exudate.   Eyes:      General:         Right eye: No discharge.         Left eye: No discharge.      Extraocular Movements: Extraocular movements intact.      Pupils: Pupils are equal, round, and reactive to light.   Neck:      Thyroid: No thyromegaly.   Cardiovascular:      Rate and Rhythm: Normal rate and regular rhythm.      Heart sounds: No murmur heard.     Comments: Admission EKG reviewed and noted to show NSR at 67 bpm.  Pulmonary:      Effort: Accessory muscle usage present. No respiratory distress.      Breath sounds: No wheezing or rales.      Comments: Desaturation noted to 88% on RA.  Chest:      Chest wall: No tenderness.   Abdominal:      General: Bowel sounds are normal. There is no distension.      Palpations: Abdomen is soft. There is no mass.      Tenderness: There is no abdominal tenderness.   Musculoskeletal:         General: No deformity.      Cervical back: Normal range of motion and neck supple.   Skin:     General: Skin is warm.      Comments: Left groin dime size open wound, healthy pink tissue without sign of infection, left heel unstagable pressure injury and right TMA incision    Neurological:      Mental Status: She is alert and oriented to person, place, and time. Mental status is at baseline.      Sensory: No sensory deficit.      Comments: Generalized weakness. Wheelchair bound due foot wounds           Significant Labs: All pertinent labs within the past 24 hours have been reviewed.  BMP:   Recent Labs   Lab 06/20/23  1852 06/21/23  0518 06/22/23  0941   GLU  --    < > 133*   NA  --    < > 140   K  --    < > 3.7   CL  --    < > 101   CO2  --    < > 24   BUN  --    < > 43*   CREATININE  --    < > 2.4*   CALCIUM  --    < > 8.6*   MG 1.9  --   --     < > = values in this interval not displayed.     CBC:   Recent Labs   Lab 06/20/23  1641 06/21/23  0931 06/22/23  0942    WBC 9.78 9.85 8.13   HGB 8.3* 8.3* 7.9*   HCT 26.0* 27.0* 26.1*    400 400     CMP:   Recent Labs   Lab 06/20/23  1641 06/21/23  0518 06/21/23  1325 06/22/23  0941    139 140 140   K 3.2* 3.7 3.5 3.7    102 101 101   CO2 27 23 24 24   * 129* 122* 133*   BUN 32* 34* 38* 43*   CREATININE 1.8* 2.0* 1.9* 2.4*   CALCIUM 8.8 8.8 8.7 8.6*   PROT 6.9  --   --   --    ALBUMIN 2.5*  --   --   --    BILITOT 0.5  --   --   --    ALKPHOS 83  --   --   --    AST 23  --   --   --    ALT 9*  --   --   --    ANIONGAP 12 14 15 15     Cardiac Markers:   Recent Labs   Lab 06/20/23  1641   *     Coagulation:   Recent Labs   Lab 06/20/23  1641   INR 1.2     Lactic Acid:   Recent Labs   Lab 06/20/23  1749   LACTATE 1.1     Magnesium:   Recent Labs   Lab 06/20/23  1852   MG 1.9     POCT Glucose:   Recent Labs   Lab 06/21/23  1644 06/21/23  1950 06/22/23  0749   POCTGLUCOSE 156* 130* 115*     Troponin:   Recent Labs   Lab 06/20/23  1641 06/20/23  2247 06/21/23  0518   TROPONINI 0.013 0.010 0.007     TSH:   Recent Labs   Lab 04/22/23  1619   TSH 1.304     Urine Studies:   Recent Labs   Lab 06/21/23  1547   COLORU Yellow   APPEARANCEUA Hazy*   PHUR 5.0   SPECGRAV 1.030   PROTEINUA 1+*   GLUCUA Negative   KETONESU 1+*   BILIRUBINUA Negative   OCCULTUA Trace*   NITRITE Negative   UROBILINOGEN 4.0-6.0*   LEUKOCYTESUR Negative   RBCUA >100*   WBCUA 5   BACTERIA Rare   HYALINECASTS 34*       Significant Imaging: I have reviewed all pertinent imaging results/findings within the past 24 hours.

## 2023-06-22 NOTE — NURSING
[facility] [place]  Nurses Note -- 4 Eyes      6/22/2023       Skin assessed on: Q Shift      [] No Pressure Injuries Present    []Prevention Measures Documented    [x] Yes LDA  for Pressure Injury Previously documented     [] Yes New Pressure Injury Discovered   [] LDA for New Pressure Injury Added      Attending RN:  Selin Hobbs LPN     Second RN:  SUSIE Archer

## 2023-06-22 NOTE — NURSING
Report received from night nurse SUSIE Archer. Visualized patient and assessed patient's overall condition and appearance. No acute distress noted. Will continue to monitor

## 2023-06-22 NOTE — CONSULTS
Date of Admit: 6/20/2023  Length of Stay: 0   Days  Consulting Staff: MD Amor  Date of Consult: 6/22/2023    Reason for Consultation       oliguric LOLY, cardiorenal?  Subjective:      History of Present Illness:  Nelsy Marino is a 82 y.o. year old female with a past medical history significant for dm, chf, and htn. Pt notes seeing a kidney doctor at [facility]. Pt is admited with chf. Pt notes also recent foot infection and surery. Pt sees podiatry. Creatinine is 2.4 up from 0.9.      Past Medical History:  Past Medical History:   Diagnosis Date    CHF (congestive heart failure)     Diabetes mellitus     DVT (deep venous thrombosis)     Hypertension     Miscarriage     On home oxygen therapy     PRN   Ckd???    Past Surgical History:  Past Surgical History:   Procedure Laterality Date     femoral vascular surgery       ANGIOGRAPHY OF LOWER EXTREMITY Right 2/23/2023    Procedure: Angiogram Extremity Unilateral;  Surgeon: Danny Dunbar MD;  Location: [facility] OR;  Service: Vascular;  Laterality: Right;  RN PHONE PREOP 2/20/2023----CK CONSENT    ANGIOGRAPHY OF LOWER EXTREMITY Right 4/4/2023    Procedure: Angiogram Extremity Unilateral - possible endovascular intervention;  Surgeon: René Simms MD;  Location: [facility] OR [address] [place] [address];  Service: Vascular;  Laterality: Right;  19.6 mins.  740.81 mGy  73.3399 Gy.cm  58ml dye  local - 1ml    COLONOSCOPY N/A 5/18/2023    Procedure: COLONOSCOPY;  Surgeon: Cuong Aguiar MD;  Location: [facility] ENDO;  Service: Endoscopy;  Laterality: N/A;    CREATION OF FEMORAL-FEMORAL ARTERY BYPASS WITH GRAFT Bilateral 3/2/2023    Procedure: CREATION, BYPASS, ARTERIAL, FEMORAL TO FEMORAL, USING GRAFT, LEFT FEMORAL ENDARTERECTOMY, LEFT ILIAC ARTERY STENT PLACEMENT, RIGHT LOWER EXTREMITY ANGIOGRAM AND RIGHT SFA  ANGIOPLASTY;  Surgeon: Danny Dubnar MD;  Location: [facility] OR [address] [place] [address];  Service: Vascular;  Laterality: Bilateral;   744.47 MGY  175.36 Gycm  Flouro time 20.5 mins      CREATION  OF ILIOFEMORAL ARTERY BYPASS Right 3/20/2019    Procedure: CREATION, BYPASS, ARTERIAL, ILIAC TO FEMORAL, RIGHT LOWER EXTREMITY, RIGHT PROFUNDAPLASTY;  Surgeon: Danny Dunbar MD;  Location: [facility] OR;  Service: Vascular;  Laterality: Right;  11:00AM START PER NAZANIN RICHARDS PREOP 3/13/2019  ACT'S, CELL SAVER, GRAFTS, BOOK WATER---NEED H/P  CONSENT IN AM------HGBA1C    CREATION OF MUSCLE ROTATIONAL FLAP Left 3/31/2023    Procedure: CREATION, FLAP, MUSCLE ROTATION;  Surgeon: Brandon Smiley MD;  [facility];  Service: Plastics;  Laterality: Left;    ENDOSCOPY OF PROXIMAL SMALL INTESTINE N/A 5/18/2023    Procedure: ENTEROSCOPY, PROXIMAL;  Surgeon: Cuong Aguiar MD;  Location: [facility];  Service: Endoscopy;  Laterality: N/A;    ESOPHAGOGASTRODUODENOSCOPY N/A 3/17/2023    Procedure: EGD (ESOPHAGOGASTRODUODENOSCOPY);  Surgeon: Gee Rodriguez MD;  [facility]  Service: Endoscopy;  Laterality: N/A;    ESOPHAGOGASTRODUODENOSCOPY N/A 4/11/2023    Procedure: EGD (ESOPHAGOGASTRODUODENOSCOPY);  Surgeon: Nazanin Kaiser MD;  [facility]  Service: Endoscopy;  Laterality: N/A;    ESOPHAGOGASTRODUODENOSCOPY N/A 4/26/2023    Procedure: EGD (ESOPHAGOGASTRODUODENOSCOPY);  Surgeon: Gee Rodriguez MD;  [facility]  Service: Endoscopy;  Laterality: N/A;    ESOPHAGOGASTRODUODENOSCOPY N/A 5/2/2023    Procedure: EGD (ESOPHAGOGASTRODUODENOSCOPY);  Surgeon: Cuong Conteh MD;  [facility]  Service: Endoscopy;  Laterality: N/A;    FOOT AMPUTATION THROUGH METATARSAL Right 3/4/2023    Procedure: AMPUTATION, FOOT, TRANSMETATARSAL;  Surgeon: Benjamin Godfrey DPM;  [facility];  Service: Podiatry;  Laterality: Right;    HYSTERECTOMY      ?unsure cervix present    INCISION AND DRAINAGE Right 4/12/2019    Procedure: Incision and Drainage - R groin washout, possible muscle flap;  Surgeon: Danny Dunbar MD;   Location: [facility] OR 2ND FLR;  Service: Vascular;  Laterality: Right;  groin washout    INCISION AND DRAINAGE OF GROIN Right 5/3/2019    Procedure: Incision and Drainage R groin;  Surgeon: Danny Dunbar MD;  [facility];  Service: Vascular;  Laterality: Right;    WOUND DEBRIDEMENT Left 3/31/2023    Procedure: DEBRIDEMENT, WOUND;  Surgeon: René Simms MD;  [facility];  Service: Vascular;  Laterality: Left;       Allergies:  Review of patient's allergies indicates:   Allergen Reactions    Motrin [ibuprofen] Itching       Home Medications:    No current facility-administered medications on file prior to encounter.     Current Outpatient Medications on File Prior to Encounter   Medication Sig Dispense Refill    acetaminophen (TYLENOL) 500 MG tablet Take 2 tablets (1,000 mg total) by mouth every 8 (eight) hours.  0    amLODIPine (NORVASC) 10 MG tablet Take 1 tablet (10 mg total) by mouth once daily. 90 tablet 0    atorvastatin (LIPITOR) 40 MG tablet Take 1 tablet (40 mg total) by mouth every evening. 30 tablet 0    BLOOD SUGAR DIAGNOSTIC (TRUE METRIX GLUCOSE TEST STRIP MISC) by Misc.(Non-Drug; Combo Route) route.      carvediloL (COREG) 25 MG tablet Take 1 tablet (25 mg total) by mouth 2 (two) times daily. 60 tablet 0    cephALEXin (KEFLEX) 500 MG capsule Take 1 capsule (500 mg total) by mouth every 12 (twelve) hours. 60 capsule 11    diphenhydrAMINE (BENADRYL) 25 mg capsule Take 25 mg by mouth daily as needed for Itching.      DULoxetine (CYMBALTA) 30 MG capsule Take 1 capsule (30 mg total) by mouth 2 (two) times daily. 60 capsule 0    fluconazole (DIFLUCAN) 200 MG Tab Take 2 tablets (400 mg total) by mouth once daily. 60 tablet 11    hydrALAZINE (APRESOLINE) 25 MG tablet Take 3 tablets (75 mg total) by mouth every 8 (eight) hours. 270 tablet 11    insulin detemir U-100, Levemir, 100 unit/mL (3 mL) SubQ InPn pen Inject 5 Units into the skin every evening. 6 mL 3    insulin  "syringe-needle,dispos. 0.3 mL 30 gauge x 5/16" Syrg by Misc.(Non-Drug; Combo Route) route.      oxyCODONE (ROXICODONE) 5 MG immediate release tablet Take 1 tablet (5 mg total) by mouth every 12 (twelve) hours as needed. 14 tablet 0    oxyCODONE-acetaminophen (PERCOCET) 5-325 mg per tablet Take 1 tablet by mouth every 8 (eight) hours as needed for Pain. 30 tablet 0    pantoprazole (PROTONIX) 40 MG tablet Take 1 tablet (40 mg total) by mouth once daily. 90 tablet 0    pen needle, diabetic 32 gauge x 5/32" Ndle Inject 1 each into the skin 4 (four) times daily. 100 each 0    vitamin D (VITAMIN D3) 1000 units Tab Take 1 tablet (1,000 Units total) by mouth once daily.         Family History:  Family History   Problem Relation Age of Onset    Diabetes Father     Hypertension Father        Social History:  Social History     Tobacco Use    Smoking status: Former     Types: Cigarettes     Quit date:      Years since quittin.5    Smokeless tobacco: Never   Substance Use Topics    Alcohol use: No    Drug use: No       Review of Systems:       Objective:     Scheduled Meds:   albuterol-ipratropium  3 mL Nebulization Q6H    atorvastatin  40 mg Oral QHS    cephALEXin  500 mg Oral Q12H    furosemide (LASIX) injection  40 mg Intravenous BID     Continuous Infusions:  PRN Meds:.acetaminophen, dextrose 50%, dextrose 50%, glucagon (human recombinant), glucose, glucose, insulin aspart U-100, melatonin, oxyCODONE-acetaminophen, promethazine (PHENERGAN) IVPB, senna-docusate 8.6-50 mg, sodium chloride 0.9%      Physical Examination:    Vitals: BP (!) 124/55 (BP Location: Left arm, Patient Position: Lying)   Pulse 73   Temp 97.7 °F (36.5 °C) (Oral)   Resp 18   Ht 5' 5" (1.651 m)   Wt 62.5 kg (137 lb 12.6 oz)   LMP  (LMP Unknown)   SpO2 (!) 91%   Breastfeeding No   BMI 22.93 kg/m²    I/O last 3 completed shifts:  In: 240 [P.O.:240]  Out: -   I/O this shift:  In: 240 [P.O.:240]  Out: -       General: wd female in " nad  HEENT:ncat,eomi,mm  CVS:s1s2 regular  PULM:ctab  ABD:bs,soft  EXT:no leg edema, wrapped foot, heel dressing  NEURO:awake, alert  Laboratory:  Recent Results (from the past 24 hour(s))   POCT glucose    Collection Time: 06/21/23  7:50 PM   Result Value Ref Range    POCT Glucose 130 (H) 70 - 110 mg/dL   POCT glucose    Collection Time: 06/22/23  7:49 AM   Result Value Ref Range    POCT Glucose 115 (H) 70 - 110 mg/dL   Basic metabolic panel    Collection Time: 06/22/23  9:41 AM   Result Value Ref Range    Sodium 140 136 - 145 mmol/L    Potassium 3.7 3.5 - 5.1 mmol/L    Chloride 101 95 - 110 mmol/L    CO2 24 23 - 29 mmol/L    Glucose 133 (H) 70 - 110 mg/dL    BUN 43 (H) 8 - 23 mg/dL    Creatinine 2.4 (H) 0.5 - 1.4 mg/dL    Calcium 8.6 (L) 8.7 - 10.5 mg/dL    Anion Gap 15 8 - 16 mmol/L    eGFR 20 (A) >60 mL/min/1.73 m^2   CBC Auto Differential    Collection Time: 06/22/23  9:42 AM   Result Value Ref Range    WBC 8.13 3.90 - 12.70 K/uL    RBC 3.15 (L) 4.00 - 5.40 M/uL    Hemoglobin 7.9 (L) 12.0 - 16.0 g/dL    Hematocrit 26.1 (L) 37.0 - 48.5 %    MCV 83 82 - 98 fL    MCH 25.1 (L) 27.0 - 31.0 pg    MCHC 30.3 (L) 32.0 - 36.0 g/dL    RDW 14.9 (H) 11.5 - 14.5 %    Platelets 400 150 - 450 K/uL    MPV 10.1 9.2 - 12.9 fL    Immature Granulocytes 0.4 0.0 - 0.5 %    Gran # (ANC) 6.2 1.8 - 7.7 K/uL    Immature Grans (Abs) 0.03 0.00 - 0.04 K/uL    Lymph # 1.2 1.0 - 4.8 K/uL    Mono # 0.5 0.3 - 1.0 K/uL    Eos # 0.3 0.0 - 0.5 K/uL    Baso # 0.02 0.00 - 0.20 K/uL    nRBC 0 0 /100 WBC    Gran % 76.1 (H) 38.0 - 73.0 %    Lymph % 14.4 (L) 18.0 - 48.0 %    Mono % 5.7 4.0 - 15.0 %    Eosinophil % 3.2 0.0 - 8.0 %    Basophil % 0.2 0.0 - 1.9 %    Differential Method Automated    POCT glucose    Collection Time: 06/22/23 11:37 AM   Result Value Ref Range    POCT Glucose 179 (H) 70 - 110 mg/dL   POCT glucose    Collection Time: 06/22/23  4:24 PM   Result Value Ref Range    POCT Glucose 201 (H) 70 - 110 mg/dL               Diagnostic Tests:    Ct chest=pulm edema, effusion       Assessment/Plan:     Nelsy Marino is a 82 y.o. female admitted with:  1.mario. diuresis as tolerable. Avoid nsaids. Renal dose meds. Ck us noted. Ck pvr.  2.anemia. ck stores.  3.acute on chronic dhf. Diuresis. No ace/arb or entresto.  4.dm foot wounds.  Cont tx.renal dose keflex as needed. Ck for ain.  5.dm2. decent hag1c. Following sugars.         Sheba Carrion

## 2023-06-22 NOTE — PLAN OF CARE
Problem: Adult Inpatient Plan of Care  Goal: Plan of Care Review  Outcome: Ongoing, Progressing  Goal: Optimal Comfort and Wellbeing  Outcome: Ongoing, Progressing  Intervention: Monitor Pain and Promote Comfort  Flowsheets (Taken 6/22/2023 1633)  Pain Management Interventions:   care clustered   pillow support provided   position adjusted

## 2023-06-22 NOTE — ASSESSMENT & PLAN NOTE
Hypoxic to 87% on RA on arrival, reports has PRN home oxygen. Chest x-ray with pulmonary findings suggesting edema and small pleural effusion  Started on diuresis  Attempted to wean supplemental oxygen, with desaturation of 88% noted on RA.  Seen above #1

## 2023-06-22 NOTE — NURSING
[facility] - [place]  Nurses Note -- 4 Eyes      6/21/2023       Skin assessed on: Q Shift      [] No Pressure Injuries Present    []Prevention Measures Documented    [x] Yes LDA  for Pressure Injury Previously documented     [] Yes New Pressure Injury Discovered   [] LDA for New Pressure Injury Added      Attending RN:  Suzi Dixon RN     Second RN:  Stacey RESENDIZ RN

## 2023-06-22 NOTE — ASSESSMENT & PLAN NOTE
Echo 3/2023 with EF of 65% and grade 2 DD. With elevated BNP, pulmonary edema and effusions on x-ray. Not on diuretic outpt.   Daily weights/strict intake and output  Attempted to Wean supplemental oxygen and desaturation of 88% on RA noted; continue supplemental oxygenation as needed.  Home coreg held as BP on lower limits of normal  Mild improvement with lasix overnight Possible underlying COPD; add duoneb  Renal function worsen; nephrology consulted for further recommendations of oliguric LOLY (cardiorenal syndrome?)

## 2023-06-22 NOTE — PLAN OF CARE
Problem: Adult Inpatient Plan of Care  Goal: Plan of Care Review  Outcome: Ongoing, Progressing  Goal: Patient-Specific Goal (Individualized)  Outcome: Ongoing, Progressing  Goal: Absence of Hospital-Acquired Illness or Injury  Outcome: Ongoing, Progressing  Goal: Optimal Comfort and Wellbeing  Outcome: Ongoing, Progressing  Goal: Readiness for Transition of Care  Outcome: Ongoing, Progressing     Problem: Skin Injury Risk Increased  Goal: Skin Health and Integrity  Outcome: Ongoing, Progressing     Problem: Diabetes Comorbidity  Goal: Blood Glucose Level Within Targeted Range  Outcome: Ongoing, Progressing     Problem: Fluid and Electrolyte Imbalance (Acute Kidney Injury/Impairment)  Goal: Fluid and Electrolyte Balance  Outcome: Ongoing, Progressing     Problem: Oral Intake Inadequate (Acute Kidney Injury/Impairment)  Goal: Optimal Nutrition Intake  Outcome: Ongoing, Progressing     Problem: Renal Function Impairment (Acute Kidney Injury/Impairment)  Goal: Effective Renal Function  Outcome: Ongoing, Progressing     Problem: Impaired Wound Healing  Goal: Optimal Wound Healing  Outcome: Ongoing, Progressing

## 2023-06-22 NOTE — ASSESSMENT & PLAN NOTE
Presents with Cr of 1.8. baseline of 0.9. with signs of volume overload on admission  6/21nt does have home oxygen but stop using few months ago. No dx of COPD and quit smoking 4 years ago. Obtain CT chest without contrast and repeat RFP now.   LOLY 1.8>2.0  Renal US showed bilateral cortical thinning, no mass, stone or hydronephrosis.   Check UA/urine protein/creatinine ratio as previous UA with proteinuria  Hold lasix pending RFP and CT chest  If no improvement, consult Nephrology  sCr1.9 stable  UPCR 0.10  Despite IV lasix UPO  350ml in approx 24 hrs  Nephrology consulted for further recommendations of oliguric LOLY; appreciate recommendations.    06/22: Cr noted to be 2.4 on AM labs.

## 2023-06-22 NOTE — CONSULTS
[facility] [place]    Patient Name:  Nelsy Marino   MRN:  [id number]  Date: 2023  Diagnosis: Acute on chronic diastolic heart failure    History:     Past Medical History:   Diagnosis Date    CHF (congestive heart failure)     Diabetes mellitus     DVT (deep venous thrombosis)     Hypertension     Miscarriage     On home oxygen therapy     PRN       Social History     Socioeconomic History    Marital status:    Tobacco Use    Smoking status: Former     Types: Cigarettes     Quit date:      Years since quittin.5    Smokeless tobacco: Never   Substance and Sexual Activity    Alcohol use: No    Drug use: No    Sexual activity: Never     Social Determinants of Health     Financial Resource Strain: Unknown    Difficulty of Paying Living Expenses: Patient refused   Food Insecurity: No Food Insecurity    Worried About Running Out of Food in the Last Year: Never true    Ran Out of Food in the Last Year: Never true   Transportation Needs: No Transportation Needs    Lack of Transportation (Medical): No    Lack of Transportation (Non-Medical): No   Physical Activity: Inactive    Days of Exercise per Week: 0 days    Minutes of Exercise per Session: 0 min   Stress: Unknown    Feeling of Stress : Patient refused   Social Connections: Unknown    Frequency of Communication with Friends and Family: More than three times a week    Frequency of Social Gatherings with Friends and Family: More than three times a week    Attends Anabaptist Services: Patient refused    Active Member of Clubs or Organizations: Patient refused    Attends Club or Organization Meetings: Patient refused    Marital Status:    Housing Stability: Low Risk     Unable to Pay for Housing in the Last Year: No    Number of Places Lived in the Last Year: 1    Unstable Housing in the Last Year: No       Precautions:     Allergies as of 2023 - Reviewed 2023   Allergen Reaction Noted    Motrin [ibuprofen] Itching  08/02/2017       [facility] Assessment Details/Treatment   Consulted by NP for altered skin integrity/wounds left groin, left heel, and right TMA  An 82 year old female admitted to [facility] 6/20/23 from home with acute on chronic diastolic heart failure; hypoxia; hypokalemia; LOLY; anemia; DM II with CKD; essential hypertension; PAD  Patient known to me from admission 5/16/23-   3/2/23 S/P LLE bypass and angioplasty & RLE angioplasty per Dr. Dunbar  3/4/23 S/P Right TMA per Dr. Godfrey  3/31/23 S/P Creation of left muscle rotation flap over left groin vascular graft per Dr. Jimenez  Treatment of wounds left groin, left distal thigh, left dorsal foot, left heel, and right TMA- was not able to extend left leg completely/had not walked since March; treatment of wounds with Vashe, Aquacel Ag hydrofiber, Mepilex foam, betadine to TMA with dry dressing  6/22 WBC 8.13 Hgb 7.9 Hct 26.1  6/20 Alb 2.5 Weight 137 lbs  A1C 6.5   On Isoflex mattress; Jb score 16; wheelchair bound  Assessment:  Photodocumentation    Right groin- Wound granulating and chetan with pink 1 cm circular opening remaining. Scant serous drainage. No surrounding erythema/induration.     Left posterior heel- Now has unstageable pressure injury with 5 cm area of dry eschar POA. Previous admit heel presented as a partial thickness wound with a pink base. EHOB boots at bedside. Encouraged patient to wear boots when supine and to bring home. Has air mattress on bed at home, but not wearing boots.    Left dorsal foot- Skin peeling from toes and great toenail no longer in place. Nail bed dry.  Left knee- Newly developed 1 cm full thickness wound with dry brown base. Family reports patient recently fell out of bed at home. Possible etiology of wound.     Right TMA- Dry stable eschar along incision.  Left thigh wounds healed  Treatment:  Continue treatment plan as per orders  Aquacel Ag + Mepilex to left groin.   Mepilex to left heel + EHOB boot  Keep right TMA clean  and dry  Dress new wound on left knee with Mepilex foam.  Pressure Injury Prevention Interventions  Orders placed for wound care 6/21 + dressing to left knee    06/22/2023

## 2023-06-23 ENCOUNTER — DOCUMENT SCAN (OUTPATIENT)
Dept: HOME HEALTH SERVICES | Facility: HOSPITAL | Age: 83
End: 2023-06-23
Payer: MEDICARE

## 2023-06-23 LAB
ANION GAP SERPL CALC-SCNC: 15 MMOL/L (ref 8–16)
BUN SERPL-MCNC: 44 MG/DL (ref 8–23)
CALCIUM SERPL-MCNC: 8.7 MG/DL (ref 8.7–10.5)
CHLORIDE SERPL-SCNC: 101 MMOL/L (ref 95–110)
CO2 SERPL-SCNC: 24 MMOL/L (ref 23–29)
CREAT SERPL-MCNC: 1.9 MG/DL (ref 0.5–1.4)
EOSINOPHIL URNS QL WRIGHT STN: NORMAL
EST. GFR  (NO RACE VARIABLE): 26 ML/MIN/1.73 M^2
GLUCOSE SERPL-MCNC: 98 MG/DL (ref 70–110)
POCT GLUCOSE: 124 MG/DL (ref 70–110)
POCT GLUCOSE: 147 MG/DL (ref 70–110)
POCT GLUCOSE: 153 MG/DL (ref 70–110)
POCT GLUCOSE: 177 MG/DL (ref 70–110)
POTASSIUM SERPL-SCNC: 3.8 MMOL/L (ref 3.5–5.1)
SODIUM SERPL-SCNC: 140 MMOL/L (ref 136–145)

## 2023-06-23 PROCEDURE — 94799 UNLISTED PULMONARY SVC/PX: CPT

## 2023-06-23 PROCEDURE — 27000221 HC OXYGEN, UP TO 24 HOURS

## 2023-06-23 PROCEDURE — 94640 AIRWAY INHALATION TREATMENT: CPT

## 2023-06-23 PROCEDURE — 94761 N-INVAS EAR/PLS OXIMETRY MLT: CPT

## 2023-06-23 PROCEDURE — 25000003 PHARM REV CODE 250: Performed by: INTERNAL MEDICINE

## 2023-06-23 PROCEDURE — 21400001 HC TELEMETRY ROOM

## 2023-06-23 PROCEDURE — 80048 BASIC METABOLIC PNL TOTAL CA: CPT | Performed by: PHYSICIAN ASSISTANT

## 2023-06-23 PROCEDURE — 36415 COLL VENOUS BLD VENIPUNCTURE: CPT | Performed by: PHYSICIAN ASSISTANT

## 2023-06-23 PROCEDURE — 25000242 PHARM REV CODE 250 ALT 637 W/ HCPCS: Performed by: NURSE PRACTITIONER

## 2023-06-23 PROCEDURE — 25000003 PHARM REV CODE 250: Performed by: PHYSICIAN ASSISTANT

## 2023-06-23 PROCEDURE — 25000003 PHARM REV CODE 250: Performed by: NURSE PRACTITIONER

## 2023-06-23 PROCEDURE — 51798 US URINE CAPACITY MEASURE: CPT

## 2023-06-23 PROCEDURE — 87205 SMEAR GRAM STAIN: CPT | Performed by: INTERNAL MEDICINE

## 2023-06-23 RX ADMIN — ATORVASTATIN CALCIUM 40 MG: 40 TABLET, FILM COATED ORAL at 09:06

## 2023-06-23 RX ADMIN — CEPHALEXIN 500 MG: 500 CAPSULE ORAL at 09:06

## 2023-06-23 RX ADMIN — ACETAMINOPHEN 650 MG: 325 TABLET ORAL at 09:06

## 2023-06-23 RX ADMIN — IPRATROPIUM BROMIDE AND ALBUTEROL SULFATE 3 ML: .5; 3 SOLUTION RESPIRATORY (INHALATION) at 01:06

## 2023-06-23 RX ADMIN — IPRATROPIUM BROMIDE AND ALBUTEROL SULFATE 3 ML: .5; 3 SOLUTION RESPIRATORY (INHALATION) at 07:06

## 2023-06-23 RX ADMIN — OXYCODONE AND ACETAMINOPHEN 1 TABLET: 5; 325 TABLET ORAL at 06:06

## 2023-06-23 RX ADMIN — BUMETANIDE 1 MG: 0.25 INJECTION INTRAMUSCULAR; INTRAVENOUS at 10:06

## 2023-06-23 RX ADMIN — CEPHALEXIN 500 MG: 500 CAPSULE ORAL at 08:06

## 2023-06-23 RX ADMIN — IPRATROPIUM BROMIDE AND ALBUTEROL SULFATE 3 ML: .5; 3 SOLUTION RESPIRATORY (INHALATION) at 08:06

## 2023-06-23 RX ADMIN — OXYCODONE AND ACETAMINOPHEN 1 TABLET: 5; 325 TABLET ORAL at 09:06

## 2023-06-23 NOTE — PROGRESS NOTES
Date of Admission:6/20/2023    SUBJECTIVE: notes no urine    Current Facility-Administered Medications   Medication    acetaminophen tablet 650 mg    albuterol-ipratropium 2.5 mg-0.5 mg/3 mL nebulizer solution 3 mL    atorvastatin tablet 40 mg    bumetanide injection 1 mg    cephALEXin capsule 500 mg    dextrose 50% injection 12.5 g    dextrose 50% injection 25 g    glucagon (human recombinant) injection 1 mg    glucose chewable tablet 16 g    glucose chewable tablet 24 g    insulin aspart U-100 pen 0-5 Units    [START ON 6/24/2023] iron sucrose injection 200 mg    melatonin tablet 6 mg    oxyCODONE-acetaminophen 5-325 mg per tablet 1 tablet    promethazine (PHENERGAN) 12.5 mg in dextrose 5 % (D5W) 50 mL IVPB    senna-docusate 8.6-50 mg per tablet 1 tablet    sodium chloride 0.9% flush 10 mL       Wt Readings from Last 3 Encounters:   06/23/23 61.5 kg (135 lb 9.3 oz)   06/13/23 68 kg (149 lb 14.6 oz)   06/07/23 68 kg (149 lb 14.6 oz)     Temp Readings from Last 3 Encounters:   06/23/23 98.2 °F (36.8 °C) (Oral)   06/08/23 98.2 °F (36.8 °C) (Oral)   06/07/23 98.2 °F (36.8 °C) (Oral)     BP Readings from Last 3 Encounters:   06/23/23 (!) 146/65   06/13/23 (!) 126/44   06/08/23 (!) 116/57     Pulse Readings from Last 3 Encounters:   06/23/23 84   06/13/23 64   06/08/23 74       Intake/Output Summary (Last 24 hours) at 6/23/2023 1224  Last data filed at 6/23/2023 0900  Gross per 24 hour   Intake 380 ml   Output 300 ml   Net 80 ml       PE:  GEN:wd female in nad  HEENT:ncat,eomi,mm  CVS:s1s2 regular  PULM:ctab  ABD:bs,soft  EXT:wrapped foot  NEURO:awake    Recent Labs   Lab 06/23/23  0513   GLU 98      K 3.8      CO2 24   BUN 44*   CREATININE 1.9*   CALCIUM 8.7       Lab Results   Component Value Date    PTH 64.7 04/24/2023    CALCIUM 8.7 06/23/2023    CAION 1.25 04/24/2023    PHOS 3.3 05/22/2023       No results for input(s): WBC, RBC, HGB, HCT, PLT, MCV, MCH, MCHC in the last 24 hours.        A/P:  1.mario.  diuresis as tolerable. Avoid nsaids. Renal dose meds. Ck us noted. Pvr up. Recommend rivas. Suspect neurogenic b bladder.  2.anemia. iron def. Started iron.  3.acute on chronic dhf. Diuresis. No ace/arb or entresto.  4.dm foot wounds.  Cont tx.renal dose keflex as needed. Ck for ain.  5.dm2. decent hag1c. Following sugars.

## 2023-06-23 NOTE — CONSULTS
Inpatient Radiology Pre-procedure Note    History of Present Illness:  Nelsy Marino is a 82 y.o. female with pertinent PMHx of HFpEF who is admitted to [facility] with SOB, acute hypoxemic respiratory failure, AoC diastolic CHF, anemia and LOLY with imaging revealing an enlarging small-to-moderate sized Rt-sided pleural effusion of uncertain etiology that is likely contributing to patients presenting symptoms requiring image-guided decompression for symptom relief and fluid-sampling for diagnosis and treatment planning.    A new inpatient IR consult received for US-guided percutaneous Rt posterolateral-approach diagnostic and therapeutic thoracentesis.    Admission H&P reviewed.  Past Medical History:   Diagnosis Date    CHF (congestive heart failure)     Diabetes mellitus     DVT (deep venous thrombosis)     Hypertension     Miscarriage     On home oxygen therapy     PRN     Past Surgical History:   Procedure Laterality Date     femoral vascular surgery       ANGIOGRAPHY OF LOWER EXTREMITY Right 2/23/2023    Procedure: Angiogram Extremity Unilateral;  Surgeon: Danny Dunbar MD;  Location: [facility] OR;  Service: Vascular;  Laterality: Right;  RN PHONE PREOP 2/20/2023----CK CONSENT    ANGIOGRAPHY OF LOWER EXTREMITY Right 4/4/2023    Procedure: Angiogram Extremity Unilateral - possible endovascular intervention;  Surgeon: René Simms MD;  Location: [facility] OR [address] [place] [address];  Service: Vascular;  Laterality: Right;  19.6 mins.  740.81 mGy  73.3399 Gy.cm  58ml dye  local - 1ml    COLONOSCOPY N/A 5/18/2023    Procedure: COLONOSCOPY;  Surgeon: Cuong Aguiar MD;  Location: [facility] ENDO;  Service: Endoscopy;  Laterality: N/A;    CREATION OF FEMORAL-FEMORAL ARTERY BYPASS WITH GRAFT Bilateral 3/2/2023    Procedure: CREATION, BYPASS, ARTERIAL, FEMORAL TO FEMORAL, USING GRAFT, LEFT FEMORAL ENDARTERECTOMY, LEFT ILIAC ARTERY STENT PLACEMENT, RIGHT LOWER EXTREMITY ANGIOGRAM AND RIGHT SFA  ANGIOPLASTY;  Surgeon: Danny Dunbar,  MD;  Location: [facility] OR 2ND FLR;  Service: Vascular;  Laterality: Bilateral;   744.47 MGY  175.36 Gycm  Flouro time 20.5 mins      CREATION OF ILIOFEMORAL ARTERY BYPASS Right 3/20/2019    Procedure: CREATION, BYPASS, ARTERIAL, ILIAC TO FEMORAL, RIGHT LOWER EXTREMITY, RIGHT PROFUNDAPLASTY;  Surgeon: Danny Dunbar MD;  Location: [facility] OR;  Service: Vascular;  Laterality: Right;  11:00AM START PER NAZANIN RICHARDS PREOP 3/13/2019  ACT'S, CELL SAVER, GRAFTS, BOOK WATER---NEED H/P  CONSENT IN AM------HGBA1C    CREATION OF MUSCLE ROTATIONAL FLAP Left 3/31/2023    Procedure: CREATION, FLAP, MUSCLE ROTATION;  Surgeon: Brandon Smiley MD;  Location: [facility] OR 2ND FLR;  Service: Plastics;  Laterality: Left;    ENDOSCOPY OF PROXIMAL SMALL INTESTINE N/A 5/18/2023    Procedure: ENTEROSCOPY, PROXIMAL;  Surgeon: Cuong Aguiar MD;  Location: [facility];  Service: Endoscopy;  Laterality: N/A;    ESOPHAGOGASTRODUODENOSCOPY N/A 3/17/2023    Procedure: EGD (ESOPHAGOGASTRODUODENOSCOPY);  Surgeon: Gee Rodriguez MD;  [facility]  Service: Endoscopy;  Laterality: N/A;    ESOPHAGOGASTRODUODENOSCOPY N/A 4/11/2023    Procedure: EGD (ESOPHAGOGASTRODUODENOSCOPY);  Surgeon: Nazanin Kaiser MD;  Location: [facility]  Service: Endoscopy;  Laterality: N/A;    ESOPHAGOGASTRODUODENOSCOPY N/A 4/26/2023    Procedure: EGD (ESOPHAGOGASTRODUODENOSCOPY);  Surgeon: Gee Rodriguez MD;  Location: [facility] ENDO ([facility]);  Service: Endoscopy;  Laterality: N/A;    ESOPHAGOGASTRODUODENOSCOPY N/A 5/2/2023    Procedure: EGD (ESOPHAGOGASTRODUODENOSCOPY);  Surgeon: Cuong Conteh MD;  Location: [facility] ENDO [facility]  Service: Endoscopy;  Laterality: N/A;    FOOT AMPUTATION THROUGH METATARSAL Right 3/4/2023    Procedure: AMPUTATION, FOOT, TRANSMETATARSAL;  Surgeon: Benjamin Godfrey DPM;  Location: [facility] OR [address] [place] [address];  Service: Podiatry;  Laterality: Right;    HYSTERECTOMY      ?unsure cervix present    INCISION AND  DRAINAGE Right 4/12/2019    Procedure: Incision and Drainage - R groin washout, possible muscle flap;  Surgeon: Danny Dunbar MD;  Location: [facility] OR 2ND FLR;  Service: Vascular;  Laterality: Right;  groin washout    INCISION AND DRAINAGE OF GROIN Right 5/3/2019    Procedure: Incision and Drainage R groin;  Surgeon: Danny Dunbar MD;  Location: [facility] OR 2ND FLR;  Service: Vascular;  Laterality: Right;    WOUND DEBRIDEMENT Left 3/31/2023    Procedure: DEBRIDEMENT, WOUND;  Surgeon: René Simms MD;  Location: [facility] OR 2ND FLR;  Service: Vascular;  Laterality: Left;     Review of Systems:   As documented in primary team H&P    Home Meds:   Prior to Admission medications    Medication Sig Start Date End Date Taking? Authorizing Provider   acetaminophen (TYLENOL) 500 MG tablet Take 2 tablets (1,000 mg total) by mouth every 8 (eight) hours. 4/30/23   Geneva Turk,    amLODIPine (NORVASC) 10 MG tablet Take 1 tablet (10 mg total) by mouth once daily. 5/3/23 8/7/23  Boris Mckeon,    atorvastatin (LIPITOR) 40 MG tablet Take 1 tablet (40 mg total) by mouth every evening. 3/20/23 3/19/24  Jasbir Hayward MD   BLOOD SUGAR DIAGNOSTIC (TRUE METRIX GLUCOSE TEST STRIP MISC) by Misc.(Non-Drug; Combo Route) route.    Historical Provider   carvediloL (COREG) 25 MG tablet Take 1 tablet (25 mg total) by mouth 2 (two) times daily. 6/2/23 6/1/24  Rivas Rojas NP   cephALEXin (KEFLEX) 500 MG capsule Take 1 capsule (500 mg total) by mouth every 12 (twelve) hours. 6/7/23 6/6/24  Caty Molina MD   diphenhydrAMINE (BENADRYL) 25 mg capsule Take 25 mg by mouth daily as needed for Itching.    Historical Provider   DULoxetine (CYMBALTA) 30 MG capsule Take 1 capsule (30 mg total) by mouth 2 (two) times daily. 3/20/23 3/19/24  Jasbir Hayward MD   fluconazole (DIFLUCAN) 200 MG Tab Take 2 tablets (400 mg total) by mouth once daily. 6/7/23 6/6/24  Caty Molina MD   hydrALAZINE (APRESOLINE) 25 MG tablet Take 3  "tablets (75 mg total) by mouth every 8 (eight) hours. 5/22/23 5/21/24  Rebecca May MD   insulin detemir U-100, Levemir, 100 unit/mL (3 mL) SubQ InPn pen Inject 5 Units into the skin every evening. 5/5/23   Geneva Turk DO   insulin syringe-needle,dispos. 0.3 mL 30 gauge x 5/16" Syrg by Misc.(Non-Drug; Combo Route) route.    Historical Provider   oxyCODONE (ROXICODONE) 5 MG immediate release tablet Take 1 tablet (5 mg total) by mouth every 12 (twelve) hours as needed. 5/5/23   Geneva Turk DO   oxyCODONE-acetaminophen (PERCOCET) 5-325 mg per tablet Take 1 tablet by mouth every 8 (eight) hours as needed for Pain. 6/13/23   Danny Dunbar MD   pantoprazole (PROTONIX) 40 MG tablet Take 1 tablet (40 mg total) by mouth once daily. 5/3/23 8/1/23  Boris Mckeon DO   pen needle, diabetic 32 gauge x 5/32" Ndle Inject 1 each into the skin 4 (four) times daily. 3/20/23   Jasbir Hayward MD   vitamin D (VITAMIN D3) 1000 units Tab Take 1 tablet (1,000 Units total) by mouth once daily. 4/30/23   Geneva Turk DO     Scheduled Meds:    albuterol-ipratropium  3 mL Nebulization Q6H    atorvastatin  40 mg Oral QHS    bumetanide  1 mg Intravenous Daily    cephALEXin  500 mg Oral Q12H     Continuous Infusions:   PRN Meds:acetaminophen, dextrose 50%, dextrose 50%, glucagon (human recombinant), glucose, glucose, insulin aspart U-100, melatonin, oxyCODONE-acetaminophen, promethazine (PHENERGAN) IVPB, senna-docusate 8.6-50 mg, sodium chloride 0.9%    Anticoagulants/Antiplatelets: no anticoagulation    Allergies:   Review of patient's allergies indicates:   Allergen Reactions    Motrin [ibuprofen] Itching     Sedation Hx: have not been any systemic reactions    Labs:  Recent Labs   Lab 06/20/23  1641   INR 1.2       Recent Labs   Lab 06/22/23  0942   WBC 8.13   HGB 7.9*   HCT 26.1*   MCV 83         Recent Labs   Lab 06/20/23  1641 06/20/23  1852 06/21/23  0518 06/23/23  0513   *  --    < > 98     --   "  < > 140   K 3.2*  --    < > 3.8     --    < > 101   CO2 27  --    < > 24   BUN 32*  --    < > 44*   CREATININE 1.8*  --    < > 1.9*   CALCIUM 8.8  --    < > 8.7   MG  --  1.9  --   --    ALT 9*  --   --   --    AST 23  --   --   --    ALBUMIN 2.5*  --   --   --    BILITOT 0.5  --   --   --     < > = values in this interval not displayed.     Vitals:  Temp: 99 °F (37.2 °C) (06/23/23 0744)  Pulse: 80 (06/23/23 0758)  Resp: 20 (06/23/23 0758)  BP: (!) 128/59 (06/23/23 0744)  SpO2: 96 % (06/23/23 0758)     Physical Exam:  General: no acute distress  Mental Status: alert and oriented to person, place and time  HEENT: normocephalic, atraumatic  Chest: +labored breathing  Heart: regular heart rate  Abdomen: nondistended  Extremity: moves all extremities    A/P:  82 y.o. female with pertinent PMHx of HFpEF who is admitted to [facility] with SOB, acute hypoxemic respiratory failure, AoC diastolic CHF, anemia and LOLY with imaging revealing an enlarging small-to-moderate sized Rt-sided pleural effusion of uncertain etiology that is likely contributing to patients presenting symptoms requiring image-guided decompression for symptom relief and fluid-sampling for diagnosis and treatment planning.    Acute hypoxemic respiratory failure with enlarging small-to-moderate sized Rt-sided pleural effusion of uncertain etiology - Will attempt US-guided percutaneous Rt posterolateral-approach diagnostic and therapeutic thoracentesis with local anesthetic only.    Risks (including, but not limited to, pain, bleeding, infection, damage to nearby structures, failure to obtain sufficient material for a diagnosis, the need for additional procedures, and death), benefits, and alternatives were discussed with the patient. All questions were answered to the best of my abilities. The patient wishes to proceed with the procedure. Written informed consent was obtained.    Thank you for considering IR for the care of your patient.     Den  MD Roma  Interventional Radiology      Addendum:  Procedure hold has been requested by [facility] at this time. Will forgo procedure at this time and patient to return back to floor.     Thank you for considering IR for the care of your patient.     Den Brandon MD  Interventional Radiology

## 2023-06-23 NOTE — PLAN OF CARE
Case Management Assessment     PCP: Juan F  Pharmacy: [facility] [place]    Patient Arrived From: home  Existing Help at Home: Dimas- spouse and family members    Barriers to Discharge: none    Discharge Plan:    A. Home with family   B. Other- tbd           06/23/23 0686   Discharge Assessment   Assessment Type Discharge Planning Assessment   Confirmed/corrected address, phone number and insurance Yes   Confirmed Demographics Correct on Facesheet   Source of Information family   When was your last doctors appointment? 06/13/23   Reason For Admission Acute on chronic diastolic heart failure   People in Home spouse   Facility Arrived From: home   Do you expect to return to your current living situation? Yes   Do you have help at home or someone to help you manage your care at home? Yes   Who are your caregiver(s) and their phone number(s)? Man Marino niece [phone] and Andrew- spouse [phone]   Prior to hospitilization cognitive status: Alert/Oriented   Current cognitive status: Unable to Assess   Walking or Climbing Stairs ambulation difficulty, requires equipment;stair climbing difficulty, requires equipment   Mobility Management wc, walker   Dressing/Bathing bathing difficulty, assistance 1 person   Dressing/Bathing Management shower chair   Equipment Currently Used at Home wheelchair;walker, standard;shower chair;hospital bed;walker, rolling   Readmission within 30 days? No   Patient currently being followed by outpatient case management? No   Do you currently have service(s) that help you manage your care at home? Yes   Name and Contact number of agency [facility]   Is the pt/caregiver preference to resume services with current agency Yes   Do you take prescription medications? Yes   Do you have prescription coverage? Yes   Coverage [employer] HMO   Do you have any problems affording any of your prescribed medications? No   Is the patient taking medications as prescribed? yes   Who is  going to help you get home at discharge? Family   How do you get to doctors appointments? family or friend will provide   Are you on dialysis? No   Do you take coumadin? No   Discharge Plan A Home with family   Discharge Plan B Other  (tbd)   DME Needed Upon Discharge  other (see comments)  (tbd)   Discharge Plan discussed with: Adult children  (niece- Christand)   Transition of Care Barriers None   OTHER   Name(s) of People in Home Evelyn- niece and Dimas- spouse

## 2023-06-23 NOTE — NURSING
[facility] [place]  Nurses Note -- 4 Eyes      6/22/2023       Skin assessed on: Q Shift      [x] No Pressure Injuries Present    []Prevention Measures Documented    [] Yes LDA  for Pressure Injury Previously documented     [] Yes New Pressure Injury Discovered   [] LDA for New Pressure Injury Added      Attending RN:  Rashad Sims, RN     Second RN:  Selin

## 2023-06-23 NOTE — NURSING
Pt finally voided. Accurate measure of output could not be collected however due to purewick leaking onto bed pad. 200cc of urine present in wall canister. Bed pad with moderate amount of urine.

## 2023-06-23 NOTE — NURSING
Still no urin output. Bladder scan performed and revealed 487cc of urin. Dr. Swanson notified. Per Dr. Swanson, scan again in 2-3 hours if still no urin output.

## 2023-06-23 NOTE — PLAN OF CARE
Problem: Diabetes Comorbidity  Goal: Blood Glucose Level Within Targeted Range  Outcome: Ongoing, Progressing     Problem: Fluid and Electrolyte Imbalance (Acute Kidney Injury/Impairment)  Goal: Fluid and Electrolyte Balance  Outcome: Ongoing, Progressing     Problem: Renal Function Impairment (Acute Kidney Injury/Impairment)  Goal: Effective Renal Function  Outcome: Ongoing, Progressing     Problem: Impaired Wound Healing  Goal: Optimal Wound Healing  Outcome: Ongoing, Progressing     Problem: Urinary Retention  Goal: Effective Urinary Elimination  Outcome: Ongoing, Progressing   214cc from bladder scan. Has not voided since being in/out by previous nurse. Denies any pain or discomfort.

## 2023-06-24 PROBLEM — R33.9 URINARY RETENTION: Status: ACTIVE | Noted: 2023-06-24

## 2023-06-24 PROBLEM — K59.00 CONSTIPATION: Status: ACTIVE | Noted: 2023-06-24

## 2023-06-24 LAB
ANION GAP SERPL CALC-SCNC: 13 MMOL/L (ref 8–16)
BACTERIA BLD CULT: NORMAL
BACTERIA BLD CULT: NORMAL
BILIRUB UR QL STRIP: NEGATIVE
BUN SERPL-MCNC: 43 MG/DL (ref 8–23)
CALCIUM SERPL-MCNC: 8.8 MG/DL (ref 8.7–10.5)
CHLORIDE SERPL-SCNC: 103 MMOL/L (ref 95–110)
CLARITY UR: CLEAR
CO2 SERPL-SCNC: 28 MMOL/L (ref 23–29)
COLOR UR: YELLOW
CREAT SERPL-MCNC: 1.3 MG/DL (ref 0.5–1.4)
EST. GFR  (NO RACE VARIABLE): 41 ML/MIN/1.73 M^2
GLUCOSE SERPL-MCNC: 130 MG/DL (ref 70–110)
GLUCOSE UR QL STRIP: NEGATIVE
HGB UR QL STRIP: NEGATIVE
KETONES UR QL STRIP: NEGATIVE
LEUKOCYTE ESTERASE UR QL STRIP: NEGATIVE
NITRITE UR QL STRIP: NEGATIVE
PH UR STRIP: 5 [PH] (ref 5–8)
POCT GLUCOSE: 124 MG/DL (ref 70–110)
POCT GLUCOSE: 125 MG/DL (ref 70–110)
POCT GLUCOSE: 128 MG/DL (ref 70–110)
POCT GLUCOSE: 146 MG/DL (ref 70–110)
POTASSIUM SERPL-SCNC: 3.3 MMOL/L (ref 3.5–5.1)
PROT UR QL STRIP: ABNORMAL
SODIUM SERPL-SCNC: 144 MMOL/L (ref 136–145)
SP GR UR STRIP: 1.01 (ref 1–1.03)
URN SPEC COLLECT METH UR: ABNORMAL
UROBILINOGEN UR STRIP-ACNC: NEGATIVE EU/DL

## 2023-06-24 PROCEDURE — 94799 UNLISTED PULMONARY SVC/PX: CPT

## 2023-06-24 PROCEDURE — 80048 BASIC METABOLIC PNL TOTAL CA: CPT | Performed by: PHYSICIAN ASSISTANT

## 2023-06-24 PROCEDURE — 94761 N-INVAS EAR/PLS OXIMETRY MLT: CPT

## 2023-06-24 PROCEDURE — 25000003 PHARM REV CODE 250: Performed by: PHYSICIAN ASSISTANT

## 2023-06-24 PROCEDURE — 25000003 PHARM REV CODE 250: Performed by: NURSE PRACTITIONER

## 2023-06-24 PROCEDURE — 25000242 PHARM REV CODE 250 ALT 637 W/ HCPCS: Performed by: NURSE PRACTITIONER

## 2023-06-24 PROCEDURE — 94640 AIRWAY INHALATION TREATMENT: CPT

## 2023-06-24 PROCEDURE — 81003 URINALYSIS AUTO W/O SCOPE: CPT | Performed by: INTERNAL MEDICINE

## 2023-06-24 PROCEDURE — 25000003 PHARM REV CODE 250: Performed by: STUDENT IN AN ORGANIZED HEALTH CARE EDUCATION/TRAINING PROGRAM

## 2023-06-24 PROCEDURE — 36415 COLL VENOUS BLD VENIPUNCTURE: CPT | Performed by: PHYSICIAN ASSISTANT

## 2023-06-24 PROCEDURE — 27000221 HC OXYGEN, UP TO 24 HOURS

## 2023-06-24 PROCEDURE — 63600175 PHARM REV CODE 636 W HCPCS: Performed by: INTERNAL MEDICINE

## 2023-06-24 PROCEDURE — 21400001 HC TELEMETRY ROOM

## 2023-06-24 PROCEDURE — 51798 US URINE CAPACITY MEASURE: CPT

## 2023-06-24 RX ORDER — POLYETHYLENE GLYCOL 3350 17 G/17G
17 POWDER, FOR SOLUTION ORAL DAILY
Status: DISCONTINUED | OUTPATIENT
Start: 2023-06-24 | End: 2023-06-26 | Stop reason: HOSPADM

## 2023-06-24 RX ORDER — SENNOSIDES 8.6 MG/1
8.6 TABLET ORAL 2 TIMES DAILY
Status: DISCONTINUED | OUTPATIENT
Start: 2023-06-24 | End: 2023-06-26 | Stop reason: HOSPADM

## 2023-06-24 RX ORDER — POTASSIUM CHLORIDE 20 MEQ/1
40 TABLET, EXTENDED RELEASE ORAL ONCE
Status: COMPLETED | OUTPATIENT
Start: 2023-06-24 | End: 2023-06-24

## 2023-06-24 RX ORDER — BUMETANIDE 0.25 MG/ML
1 INJECTION INTRAMUSCULAR; INTRAVENOUS 2 TIMES DAILY
Status: DISCONTINUED | OUTPATIENT
Start: 2023-06-24 | End: 2023-06-26 | Stop reason: HOSPADM

## 2023-06-24 RX ADMIN — OXYCODONE AND ACETAMINOPHEN 1 TABLET: 5; 325 TABLET ORAL at 02:06

## 2023-06-24 RX ADMIN — IPRATROPIUM BROMIDE AND ALBUTEROL SULFATE 3 ML: .5; 3 SOLUTION RESPIRATORY (INHALATION) at 12:06

## 2023-06-24 RX ADMIN — ATORVASTATIN CALCIUM 40 MG: 40 TABLET, FILM COATED ORAL at 09:06

## 2023-06-24 RX ADMIN — IPRATROPIUM BROMIDE AND ALBUTEROL SULFATE 3 ML: .5; 3 SOLUTION RESPIRATORY (INHALATION) at 07:06

## 2023-06-24 RX ADMIN — POTASSIUM CHLORIDE 40 MEQ: 1500 TABLET, EXTENDED RELEASE ORAL at 08:06

## 2023-06-24 RX ADMIN — IPRATROPIUM BROMIDE AND ALBUTEROL SULFATE 3 ML: .5; 3 SOLUTION RESPIRATORY (INHALATION) at 01:06

## 2023-06-24 RX ADMIN — SENNOSIDES 8.6 MG: 8.6 TABLET, FILM COATED ORAL at 08:06

## 2023-06-24 RX ADMIN — OXYCODONE AND ACETAMINOPHEN 1 TABLET: 5; 325 TABLET ORAL at 10:06

## 2023-06-24 RX ADMIN — BUMETANIDE 1 MG: 0.25 INJECTION INTRAMUSCULAR; INTRAVENOUS at 09:06

## 2023-06-24 RX ADMIN — CEPHALEXIN 500 MG: 500 CAPSULE ORAL at 08:06

## 2023-06-24 RX ADMIN — SENNOSIDES 8.6 MG: 8.6 TABLET, FILM COATED ORAL at 09:06

## 2023-06-24 RX ADMIN — BUMETANIDE 1 MG: 0.25 INJECTION INTRAMUSCULAR; INTRAVENOUS at 08:06

## 2023-06-24 RX ADMIN — IPRATROPIUM BROMIDE AND ALBUTEROL SULFATE 3 ML: .5; 3 SOLUTION RESPIRATORY (INHALATION) at 08:06

## 2023-06-24 RX ADMIN — IRON SUCROSE 200 MG: 20 INJECTION, SOLUTION INTRAVENOUS at 08:06

## 2023-06-24 RX ADMIN — POLYETHYLENE GLYCOL 3350 17 G: 17 POWDER, FOR SOLUTION ORAL at 08:06

## 2023-06-24 RX ADMIN — OXYCODONE AND ACETAMINOPHEN 1 TABLET: 5; 325 TABLET ORAL at 07:06

## 2023-06-24 RX ADMIN — CEPHALEXIN 500 MG: 500 CAPSULE ORAL at 09:06

## 2023-06-24 NOTE — NURSING
[facility] [place]  Nurses Note -- 4 Eyes      6/23/2023       Skin assessed on: Q Shift      [x] No Pressure Injuries Present    []Prevention Measures Documented    [] Yes LDA  for Pressure Injury Previously documented     [] Yes New Pressure Injury Discovered   [] LDA for New Pressure Injury Added      Attending RN:  Rashad iSms RN     Second RN:  April

## 2023-06-24 NOTE — PLAN OF CARE
Problem: Adult Inpatient Plan of Care  Goal: Plan of Care Review  Outcome: Ongoing, Progressing  Goal: Patient-Specific Goal (Individualized)  Outcome: Ongoing, Progressing  Goal: Absence of Hospital-Acquired Illness or Injury  Outcome: Ongoing, Progressing  Goal: Optimal Comfort and Wellbeing  Outcome: Ongoing, Progressing  Goal: Readiness for Transition of Care  Outcome: Ongoing, Progressing     Problem: Skin Injury Risk Increased  Goal: Skin Health and Integrity  Outcome: Ongoing, Progressing     Problem: Diabetes Comorbidity  Goal: Blood Glucose Level Within Targeted Range  Outcome: Ongoing, Progressing     Problem: Fluid and Electrolyte Imbalance (Acute Kidney Injury/Impairment)  Goal: Fluid and Electrolyte Balance  Outcome: Ongoing, Progressing     Problem: Impaired Wound Healing  Goal: Optimal Wound Healing  Outcome: Ongoing, Progressing     Problem: Fall Injury Risk  Goal: Absence of Fall and Fall-Related Injury  Outcome: Ongoing, Progressing   Patient remained free from falls and injuries. Complained of pain and medicated as ordered. Wound care performed. Call light within reach. Safety precautions maintained. Will continue to monitor.

## 2023-06-24 NOTE — NURSING
Pt noted to not void this shift. Bladder scan shows over 541cc, MD alerted and order In/Out with UA.

## 2023-06-24 NOTE — SUBJECTIVE & OBJECTIVE
Interval History: No events overnight. Improving creatinine and symptomology.    Review of Systems   Constitutional:  Negative for chills and fever.   Respiratory:  Negative for cough and shortness of breath.    Cardiovascular:  Negative for chest pain and leg swelling.   Gastrointestinal:  Negative for abdominal distention and abdominal pain.   Objective:     Vital Signs (Most Recent):  Temp: 98.6 °F (37 °C) (06/24/23 0759)  Pulse: 85 (06/24/23 0759)  Resp: 16 (06/24/23 0759)  BP: (!) 118/58 (06/24/23 0759)  SpO2: (!) 94 % (06/24/23 0759) Vital Signs (24h Range):  Temp:  [98.2 °F (36.8 °C)-98.8 °F (37.1 °C)] 98.6 °F (37 °C)  Pulse:  [79-94] 85  Resp:  [16-20] 16  SpO2:  [88 %-98 %] 94 %  BP: (108-146)/(52-65) 118/58     Weight: 60.3 kg (132 lb 15 oz)  Body mass index is 22.12 kg/m².    Intake/Output Summary (Last 24 hours) at 6/24/2023 1025  Last data filed at 6/24/2023 0213  Gross per 24 hour   Intake 260 ml   Output 631 ml   Net -371 ml         Physical Exam  Constitutional:       General: She is not in acute distress.     Appearance: She is ill-appearing. She is not toxic-appearing or diaphoretic.   Cardiovascular:      Rate and Rhythm: Normal rate and regular rhythm.      Heart sounds: No murmur heard.    No gallop.   Pulmonary:      Effort: Pulmonary effort is normal. No respiratory distress.      Breath sounds: Rales present. No wheezing.   Abdominal:      General: Bowel sounds are normal. There is no distension.      Palpations: Abdomen is soft.      Tenderness: There is no abdominal tenderness.           Significant Labs: All pertinent labs within the past 24 hours have been reviewed.    Significant Imaging: I have reviewed all pertinent imaging results/findings within the past 24 hours.

## 2023-06-24 NOTE — PROGRESS NOTES
Renal Progress Note    Date of Admission:  6/20/2023  4:23 PM    Length of Stay: 2  Days    Subjective: no complaints    Objective:    Current Facility-Administered Medications   Medication    acetaminophen tablet 650 mg    albuterol-ipratropium 2.5 mg-0.5 mg/3 mL nebulizer solution 3 mL    atorvastatin tablet 40 mg    bumetanide injection 1 mg    cephALEXin capsule 500 mg    dextrose 50% injection 12.5 g    dextrose 50% injection 25 g    glucagon (human recombinant) injection 1 mg    glucose chewable tablet 16 g    glucose chewable tablet 24 g    insulin aspart U-100 pen 0-5 Units    iron sucrose injection 200 mg    melatonin tablet 6 mg    oxyCODONE-acetaminophen 5-325 mg per tablet 1 tablet    polyethylene glycol packet 17 g    potassium chloride SA CR tablet 40 mEq    promethazine (PHENERGAN) 12.5 mg in dextrose 5 % (D5W) 50 mL IVPB    senna tablet 8.6 mg    senna-docusate 8.6-50 mg per tablet 1 tablet    sodium chloride 0.9% flush 10 mL       Vitals:    06/24/23 0224 06/24/23 0523 06/24/23 0553 06/24/23 0735   BP:  132/60     BP Location:  Left arm     Patient Position:  Lying     Pulse:  79  79   Resp: 18 18  18   Temp:  98.5 °F (36.9 °C)     TempSrc:  Oral     SpO2:  97%  97%   Weight:   60.3 kg (132 lb 15 oz)    Height:           I/O last 3 completed shifts:  In: 400 [P.O.:400]  Out: 931 [Urine:931]  No intake/output data recorded.      Physical Exam:     General: NAD  Neck: supple  Heart: RRR  Lungs: unlabored breathing  Abdomen: n/a  Limbs: n/a  Neurologic: awake      Laboratories:    No results for input(s): WBC, RBC, HGB, HCT, PLT, MCV, MCH, MCHC in the last 24 hours.    Recent Labs   Lab 06/24/23  0452   CALCIUM 8.8      K 3.3*   CO2 28      BUN 43*   CREATININE 1.3       Recent Labs   Lab 06/24/23  0213   COLORU Yellow   SPECGRAV 1.015   PHUR 5.0   PROTEINUA Trace*       Microbiology Results (last 7 days)       Procedure Component Value Units Date/Time    Blood  "culture #2 **CANNOT BE ORDERED STAT** [[id number]] Collected: 06/20/23 1721    Order Status: Completed Specimen: Blood Updated: 06/23/23 1903     Blood Culture, Routine No Growth to date      No Growth to date      No Growth to date      No Growth to date    Blood culture #1 **CANNOT BE ORDERED STAT** [[id number]] Collected: 06/20/23 1754    Order Status: Completed Specimen: Blood Updated: 06/23/23 1903     Blood Culture, Routine No Growth to date      No Growth to date      No Growth to date      No Growth to date              Diagnostic Tests: n/a        Assessment:    83 y/o female admitted with:    - LOLY in resolution  - Fluid overload  - Acute on chronic Diast. HF  - Mild hypoK+  - DM2  - Diabetic foot wounds  - PAD s/p Fem/fem bypass  - Fe++ def. Anemia  - Hypoalbuminemia        Plan:    - "judicious" diuresis  - Replace K+ orally  - IV iron loading per Dr. Carrion  - Renal ADA diet + protein supplements  - Will follow Xab-ngdar-msddv-acid/base and I&O  - Check PO4-  - Other problems per admitting                  "

## 2023-06-24 NOTE — ASSESSMENT & PLAN NOTE
Patient's FSGs are controlled on current medication regimen.  Last A1c reviewed-   Lab Results   Component Value Date    HGBA1C 6.5 (H) 06/20/2023     Most recent fingerstick glucose reviewed-   Recent Labs   Lab 06/23/23  1131 06/23/23  1640 06/23/23 2057 06/24/23  0759   POCTGLUCOSE 177* 147* 153* 128*     Current correctional scale  Low  Maintain anti-hyperglycemic dose as follows-   Antihyperglycemics (From admission, onward)    Start     Stop Route Frequency Ordered    06/20/23 1851  insulin aspart U-100 pen 0-5 Units         -- SubQ Before meals & nightly PRN 06/20/23 1752        Hold Oral hypoglycemics while patient is in the hospital.

## 2023-06-24 NOTE — PROGRESS NOTES
[facility] Medicine  Progress Note    Patient Name: Nelsy Marino  MRN: [id number]  Patient Class: IP- Inpatient   Admission Date: 6/20/2023  Length of Stay: 2 days  Attending Physician: Cuong Swanson MD  Primary Care Provider: [facility]        Subjective:     Principal Problem:Acute on chronic diastolic heart failure        HPI:  Nelsy Marino 82 y.o. female with HFpEF, HTN, PAD S/P Fem/fem bypass on chronic suppressive abx presents to the hospital with a chief complaint of SOB.  She reports she developed shortness of breath last night that she attempted treatment for at home her home oxygen without improvement.  After she began wearing her nasal cannula she developed epistaxis which has resolved without intervention.  She continues to feel short of breath.  She reports she is not on a diuretic outpatient.  She and her  deny any salty food intake.  She denies fever chest pain nausea vomiting abdominal pain melena hematuria hematemesis productive and sick contacts.    In the ED, hypoxic to 87% on room air chest x-ray with pulmonary edema and bilateral pleural effusions BNP at 906 troponin negative lactic acid negative COVID negative flu negative creatinine 1.8 afebrile without leukocytosis.      Overview/Hospital Course:  Admission to observation for acute on chronic diastolic heart failure. IV lasix 40 mg started with mild improvement in symptoms and continue to require oxygen supplementation. Noted LOLY worsen also. Renal US showed bilateral cortical thinning, no mass, stone or hydronephrosis. Patient have chronic left foot pain however due to history of DVT, US BLE which showed no DVT.   Will place wound care orders for chronic left groin dime size open wound, chronic left heel unstagable pressure injury, and right TMA. Wound Care consulted to dress and treat wounds.  Patient does have home oxygen but stop using few months ago. No dx of COPD and quit smoking 4 years ago.  Obtain CT chest without contrast and repeat RFP now. Despite IV diuresis, urine output noted to be 350 mL in 24 hours. Nephrology consulted for further recommendations on management of oliguric LOLY.      Interval History: No events overnight. Improving creatinine and symptomology.    Review of Systems   Constitutional:  Negative for chills and fever.   Respiratory:  Negative for cough and shortness of breath.    Cardiovascular:  Negative for chest pain and leg swelling.   Gastrointestinal:  Negative for abdominal distention and abdominal pain.   Objective:     Vital Signs (Most Recent):  Temp: 98.6 °F (37 °C) (06/24/23 0759)  Pulse: 85 (06/24/23 0759)  Resp: 16 (06/24/23 0759)  BP: (!) 118/58 (06/24/23 0759)  SpO2: (!) 94 % (06/24/23 0759) Vital Signs (24h Range):  Temp:  [98.2 °F (36.8 °C)-98.8 °F (37.1 °C)] 98.6 °F (37 °C)  Pulse:  [79-94] 85  Resp:  [16-20] 16  SpO2:  [88 %-98 %] 94 %  BP: (108-146)/(52-65) 118/58     Weight: 60.3 kg (132 lb 15 oz)  Body mass index is 22.12 kg/m².    Intake/Output Summary (Last 24 hours) at 6/24/2023 1025  Last data filed at 6/24/2023 0213  Gross per 24 hour   Intake 260 ml   Output 631 ml   Net -371 ml         Physical Exam  Constitutional:       General: She is not in acute distress.     Appearance: She is ill-appearing. She is not toxic-appearing or diaphoretic.   Cardiovascular:      Rate and Rhythm: Normal rate and regular rhythm.      Heart sounds: No murmur heard.    No gallop.   Pulmonary:      Effort: Pulmonary effort is normal. No respiratory distress.      Breath sounds: Rales present. No wheezing.   Abdominal:      General: Bowel sounds are normal. There is no distension.      Palpations: Abdomen is soft.      Tenderness: There is no abdominal tenderness.           Significant Labs: All pertinent labs within the past 24 hours have been reviewed.    Significant Imaging: I have reviewed all pertinent imaging results/findings within the past 24  hours.      Assessment/Plan:      * Acute on chronic diastolic heart failure  Echo 3/2023 with EF of 65% and grade 2 DD. With elevated BNP, pulmonary edema and effusions on x-ray. Not on diuretic outpt.   Daily weights/strict intake and output  Attempted to Wean supplemental oxygen and desaturation of 88% on RA noted; continue supplemental oxygenation as needed.  Home coreg held as BP on lower limits of normal  Mild improvement with lasix overnight Possible underlying COPD; add duoneb  Renal function worsen; nephrology consulted for further recommendations of oliguric LOLY (cardiorenal syndrome?)    Constipation  Start bowel regimen      Urinary retention  Pt w/ urinary retention.   - treat constipation  - reassess  - straight cath until then      Wounds, multiple  PTA, HH wound care twice a week    1. left groin dime size open wound-cleanse with vashe, then apply Aquacel Ag and cover with Mepilex 4x4  2. chronic left heel unstagable pressure injury-cleanse with vashe and cover with mepilex 6x6  3.  Right TMA -cleanse with vashe, apply betadine cover with dry gauze secure with Kerlix and ACE  Green boot to off load BLE  Wound care consulted    Acute hypoxemic respiratory failure  Hypoxic to 87% on RA on arrival, reports has PRN home oxygen. Chest x-ray with pulmonary findings suggesting edema and small pleural effusion  Started on diuresis  Attempted to wean supplemental oxygen, with desaturation of 88% noted on RA.  Seen above #1    Hypokalemia  Potassium of 3.2 on arrival. Will replete oral and check Mg  Resolved    Debility  Uses wheelchair at baseline, due to foot wounds.    LOLY (acute kidney injury)  Presents with Cr of 1.8. baseline of 0.9. with signs of volume overload on admission  6/21nt does have home oxygen but stop using few months ago. No dx of COPD and quit smoking 4 years ago. Obtain CT chest without contrast and repeat RFP now.   LOLY 1.8>2.0  Renal US showed bilateral cortical thinning, no mass, stone  or hydronephrosis.   Check UA/urine protein/creatinine ratio as previous UA with proteinuria  Hold lasix pending RFP and CT chest  If no improvement, consult Nephrology  sCr1.9 stable  UPCR 0.10  Despite IV lasix UPO  350ml in approx 24 hrs  Nephrology consulted for further recommendations of oliguric LOLY; appreciate recommendations.    06/22: Cr noted to be 2.4 on AM labs.    Now improving. Unclear if this is due to diuresis or resolution of ATN of unknown etiology or relief of urinary obstruction.    Anemia  Presents with hemoglobin of 8.3. previous baseline near 9. Denies bleeding no indication for transfusion at this time  History of AVMs no longer on ASA/plavix  Continue to monitor    Type 2 diabetes mellitus with chronic kidney disease, with long-term current use of insulin  Patient's FSGs are controlled on current medication regimen.  Last A1c reviewed-   Lab Results   Component Value Date    HGBA1C 6.5 (H) 06/20/2023     Most recent fingerstick glucose reviewed-   Recent Labs   Lab 06/23/23  1131 06/23/23  1640 06/23/23  2057 06/24/23  0759   POCTGLUCOSE 177* 147* 153* 128*     Current correctional scale  Low  Maintain anti-hyperglycemic dose as follows-   Antihyperglycemics (From admission, onward)    Start     Stop Route Frequency Ordered    06/20/23 1851  insulin aspart U-100 pen 0-5 Units         -- SubQ Before meals & nightly PRN 06/20/23 1752        Hold Oral hypoglycemics while patient is in the hospital.    Essential hypertension  BP well controlled. Resume home coreg/hydralazine if BP elevates    PAD (peripheral artery disease)  S/P fem/fem bypass 3/3023. Did not tolerate aspirin/plavix to due GI bleeding per chart review  contineu home statin. 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   Continue home keflex and fluconazole (QTc 473 today ranges 415-453)      VTE Risk Mitigation (From admission, onward)         Ordered     Place GABE hose  Until discontinued          06/20/23 1839     IP VTE HIGH RISK PATIENT  Once         06/20/23 1749     Place sequential compression device  Until discontinued         06/20/23 1749                Discharge Planning   JUAN ALBERTO: 6/26/2023     Code Status: Full Code   Is the patient medically ready for discharge?:     Reason for patient still in hospital (select all that apply): Patient trending condition, Laboratory test, Treatment, Consult recommendations and Pending disposition  Discharge Plan A: Home with family                  Cuong Swanson MD  Department of [facility] Medicine   [facility]

## 2023-06-24 NOTE — ASSESSMENT & PLAN NOTE
Presents with Cr of 1.8. baseline of 0.9. with signs of volume overload on admission  6/21nt does have home oxygen but stop using few months ago. No dx of COPD and quit smoking 4 years ago. Obtain CT chest without contrast and repeat RFP now.   LOLY 1.8>2.0  Renal US showed bilateral cortical thinning, no mass, stone or hydronephrosis.   Check UA/urine protein/creatinine ratio as previous UA with proteinuria  Hold lasix pending RFP and CT chest  If no improvement, consult Nephrology  sCr1.9 stable  UPCR 0.10  Despite IV lasix UPO  350ml in approx 24 hrs  Nephrology consulted for further recommendations of oliguric LOLY; appreciate recommendations.    06/22: Cr noted to be 2.4 on AM labs.    Now improving. Unclear if this is due to diuresis or resolution of ATN of unknown etiology or relief of urinary obstruction.

## 2023-06-24 NOTE — PLAN OF CARE
Problem: Fluid and Electrolyte Imbalance (Acute Kidney Injury/Impairment)  Goal: Fluid and Electrolyte Balance  Outcome: Ongoing, Progressing     Problem: Oral Intake Inadequate (Acute Kidney Injury/Impairment)  Goal: Optimal Nutrition Intake  Outcome: Ongoing, Progressing     Problem: Renal Function Impairment (Acute Kidney Injury/Impairment)  Goal: Effective Renal Function  Outcome: Ongoing, Progressing     Problem: Impaired Wound Healing  Goal: Optimal Wound Healing  Outcome: Ongoing, Progressing     Problem: Urinary Retention  Goal: Effective Urinary Elimination  Outcome: Ongoing, Progressing

## 2023-06-24 NOTE — NURSING
[facility], [facility] [place]  Nurses Note -- 4 Eyes      6/24/2023       Skin assessed on: Q Shift      [x] No Pressure Injuries Present    [x]Prevention Measures Documented    [] Yes LDA  for Pressure Injury Previously documented     [] Yes New Pressure Injury Discovered   [] LDA for New Pressure Injury Added      Attending RN:  Terra Rendon RN     Second RN:  SUSIE Solorzano

## 2023-06-24 NOTE — NURSING
PER handoff received from SUSIE Solorzano     Pt resting in bed quietly. NAD noted. No c/o pain.  Fall and safety precautions maintained. Bed alarm activated and audible.. Bed locked in lowest position, with side rails up x2. Call bell and personal items within reach

## 2023-06-25 LAB
ANION GAP SERPL CALC-SCNC: 9 MMOL/L (ref 8–16)
BUN SERPL-MCNC: 32 MG/DL (ref 8–23)
CALCIUM SERPL-MCNC: 8.9 MG/DL (ref 8.7–10.5)
CHLORIDE SERPL-SCNC: 105 MMOL/L (ref 95–110)
CO2 SERPL-SCNC: 30 MMOL/L (ref 23–29)
CREAT SERPL-MCNC: 0.9 MG/DL (ref 0.5–1.4)
EST. GFR  (NO RACE VARIABLE): >60 ML/MIN/1.73 M^2
GLUCOSE SERPL-MCNC: 109 MG/DL (ref 70–110)
PHOSPHATE SERPL-MCNC: 2.7 MG/DL (ref 2.7–4.5)
POCT GLUCOSE: 100 MG/DL (ref 70–110)
POCT GLUCOSE: 110 MG/DL (ref 70–110)
POCT GLUCOSE: 111 MG/DL (ref 70–110)
POCT GLUCOSE: 114 MG/DL (ref 70–110)
POTASSIUM SERPL-SCNC: 3.7 MMOL/L (ref 3.5–5.1)
SODIUM SERPL-SCNC: 144 MMOL/L (ref 136–145)

## 2023-06-25 PROCEDURE — 27000221 HC OXYGEN, UP TO 24 HOURS

## 2023-06-25 PROCEDURE — 94799 UNLISTED PULMONARY SVC/PX: CPT

## 2023-06-25 PROCEDURE — 94761 N-INVAS EAR/PLS OXIMETRY MLT: CPT

## 2023-06-25 PROCEDURE — 25000003 PHARM REV CODE 250: Performed by: NURSE PRACTITIONER

## 2023-06-25 PROCEDURE — 94640 AIRWAY INHALATION TREATMENT: CPT

## 2023-06-25 PROCEDURE — 25000242 PHARM REV CODE 250 ALT 637 W/ HCPCS: Performed by: NURSE PRACTITIONER

## 2023-06-25 PROCEDURE — 80048 BASIC METABOLIC PNL TOTAL CA: CPT | Performed by: PHYSICIAN ASSISTANT

## 2023-06-25 PROCEDURE — 21400001 HC TELEMETRY ROOM

## 2023-06-25 PROCEDURE — 25000003 PHARM REV CODE 250: Performed by: PHYSICIAN ASSISTANT

## 2023-06-25 PROCEDURE — 84100 ASSAY OF PHOSPHORUS: CPT | Performed by: INTERNAL MEDICINE

## 2023-06-25 PROCEDURE — 25000003 PHARM REV CODE 250: Performed by: STUDENT IN AN ORGANIZED HEALTH CARE EDUCATION/TRAINING PROGRAM

## 2023-06-25 RX ADMIN — Medication 6 MG: at 09:06

## 2023-06-25 RX ADMIN — CEPHALEXIN 500 MG: 500 CAPSULE ORAL at 08:06

## 2023-06-25 RX ADMIN — ATORVASTATIN CALCIUM 40 MG: 40 TABLET, FILM COATED ORAL at 09:06

## 2023-06-25 RX ADMIN — BUMETANIDE 1 MG: 0.25 INJECTION INTRAMUSCULAR; INTRAVENOUS at 08:06

## 2023-06-25 RX ADMIN — ACETAMINOPHEN 650 MG: 325 TABLET ORAL at 02:06

## 2023-06-25 RX ADMIN — CEPHALEXIN 500 MG: 500 CAPSULE ORAL at 09:06

## 2023-06-25 RX ADMIN — SENNOSIDES 8.6 MG: 8.6 TABLET, FILM COATED ORAL at 09:06

## 2023-06-25 RX ADMIN — IPRATROPIUM BROMIDE AND ALBUTEROL SULFATE 3 ML: .5; 3 SOLUTION RESPIRATORY (INHALATION) at 01:06

## 2023-06-25 RX ADMIN — SENNOSIDES 8.6 MG: 8.6 TABLET, FILM COATED ORAL at 08:06

## 2023-06-25 RX ADMIN — IPRATROPIUM BROMIDE AND ALBUTEROL SULFATE 3 ML: .5; 3 SOLUTION RESPIRATORY (INHALATION) at 07:06

## 2023-06-25 RX ADMIN — OXYCODONE AND ACETAMINOPHEN 1 TABLET: 5; 325 TABLET ORAL at 03:06

## 2023-06-25 RX ADMIN — BUMETANIDE 1 MG: 0.25 INJECTION INTRAMUSCULAR; INTRAVENOUS at 09:06

## 2023-06-25 RX ADMIN — OXYCODONE AND ACETAMINOPHEN 1 TABLET: 5; 325 TABLET ORAL at 09:06

## 2023-06-25 RX ADMIN — POLYETHYLENE GLYCOL 3350 17 G: 17 POWDER, FOR SOLUTION ORAL at 08:06

## 2023-06-25 RX ADMIN — OXYCODONE AND ACETAMINOPHEN 1 TABLET: 5; 325 TABLET ORAL at 10:06

## 2023-06-25 NOTE — ASSESSMENT & PLAN NOTE
Presents with Cr of 1.8. baseline of 0.9. with signs of volume overload on admission  6/21nt does have home oxygen but stop using few months ago. No dx of COPD and quit smoking 4 years ago. Obtain CT chest without contrast and repeat RFP now.   LOLY 1.8>2.0  Renal US showed bilateral cortical thinning, no mass, stone or hydronephrosis.   Check UA/urine protein/creatinine ratio as previous UA with proteinuria  Hold lasix pending RFP and CT chest  If no improvement, consult Nephrology  sCr1.9 stable  UPCR 0.10  Despite IV lasix UPO  350ml in approx 24 hrs  Nephrology consulted for further recommendations of oliguric LOLY; appreciate recommendations.    06/22: Cr noted to be 2.4 on AM labs.    Now improving. Unclear if this is due to diuresis or resolution of ATN of unknown etiology or relief of urinary obstruction.    Resolved

## 2023-06-25 NOTE — PLAN OF CARE
Problem: Adult Inpatient Plan of Care  Goal: Plan of Care Review  Outcome: Ongoing, Progressing     Problem: Skin Injury Risk Increased  Goal: Skin Health and Integrity  Outcome: Ongoing, Progressing     Problem: Diabetes Comorbidity  Goal: Blood Glucose Level Within Targeted Range  Outcome: Ongoing, Progressing     Problem: Fluid and Electrolyte Imbalance (Acute Kidney Injury/Impairment)  Goal: Fluid and Electrolyte Balance  Outcome: Ongoing, Progressing     Problem: Oral Intake Inadequate (Acute Kidney Injury/Impairment)  Goal: Optimal Nutrition Intake  Outcome: Ongoing, Progressing     Problem: Renal Function Impairment (Acute Kidney Injury/Impairment)  Goal: Effective Renal Function  Outcome: Ongoing, Progressing     Problem: Impaired Wound Healing  Goal: Optimal Wound Healing  Outcome: Ongoing, Progressing     Problem: Fall Injury Risk  Goal: Absence of Fall and Fall-Related Injury  Outcome: Ongoing, Progressing     Problem: Urinary Retention  Goal: Effective Urinary Elimination  Outcome: Ongoing, Progressing

## 2023-06-25 NOTE — NURSING
[facility]  Nurses Note -- 4 Eyes      6/25/2023       Skin assessed on: Q Shift      [x] No Pressure Injuries Present    [x]Prevention Measures Documented    [] Yes LDA  for Pressure Injury Previously documented     [] Yes New Pressure Injury Discovered   [] LDA for New Pressure Injury Added      Attending RN:  Terra Rendon RN     Second RN:  SUSIE Kunz

## 2023-06-25 NOTE — PROGRESS NOTES
Renal Progress Note    Date of Admission:  6/20/2023  4:23 PM    Length of Stay: 3  Days    Subjective: n/a    Objective:    Current Facility-Administered Medications   Medication    acetaminophen tablet 650 mg    albuterol-ipratropium 2.5 mg-0.5 mg/3 mL nebulizer solution 3 mL    atorvastatin tablet 40 mg    bumetanide injection 1 mg    cephALEXin capsule 500 mg    dextrose 50% injection 12.5 g    dextrose 50% injection 25 g    glucagon (human recombinant) injection 1 mg    glucose chewable tablet 16 g    glucose chewable tablet 24 g    insulin aspart U-100 pen 0-5 Units    iron sucrose injection 200 mg    melatonin tablet 6 mg    oxyCODONE-acetaminophen 5-325 mg per tablet 1 tablet    polyethylene glycol packet 17 g    promethazine (PHENERGAN) 12.5 mg in dextrose 5 % (D5W) 50 mL IVPB    senna tablet 8.6 mg    senna-docusate 8.6-50 mg per tablet 1 tablet    sodium chloride 0.9% flush 10 mL       Vitals:    06/25/23 0343 06/25/23 0430 06/25/23 0735 06/25/23 0804   BP:  124/68  134/63   BP Location:  Left arm  Left arm   Patient Position:  Lying  Lying   Pulse:  84 83 80   Resp: 16 18 20 16   Temp:  98.1 °F (36.7 °C)  98.1 °F (36.7 °C)   TempSrc:  Oral  Oral   SpO2:  95% 98% 99%   Weight:       Height:           I/O last 3 completed shifts:  In: -   Out: 431 [Urine:431]  No intake/output data recorded.      Physical Exam: unchanged      Laboratories:    No results for input(s): WBC, RBC, HGB, HCT, PLT, MCV, MCH, MCHC in the last 24 hours.    Recent Labs   Lab 06/25/23  0454   CALCIUM 8.9      K 3.7   CO2 30*      BUN 32*   CREATININE 0.9         No results for input(s): COLORU, CLARITYU, SPECGRAV, PHUR, PROTEINUA, GLUCOSEU, BLOODU, WBCU, RBCU, BACTERIA, MUCUS in the last 24 hours.    Invalid input(s):  BILIRUBINCON      Microbiology Results (last 7 days)       Procedure Component Value Units Date/Time    Blood culture #2 **CANNOT BE ORDERED STAT** [[id number]] Collected: 06/20/23  "1721    Order Status: Completed Specimen: Blood Updated: 06/24/23 1903     Blood Culture, Routine No Growth after 4 days.    Blood culture #1 **CANNOT BE ORDERED STAT** [[id number]] Collected: 06/20/23 1754    Order Status: Completed Specimen: Blood Updated: 06/24/23 1903     Blood Culture, Routine No Growth after 4 days.              Diagnostic Tests: n/a        Assessment:    83 y/o female admitted with:    - LOLY in resolution  - Fluid overload  - Acute on chronic Diast. HF  - Mild hypoK+  - DM2  - Diabetic foot wounds  - PAD s/p Fem/fem bypass  - Fe++ def. Anemia  - Hypoalbuminemia        Plan:    - "judicious" diuresis  - Replace K+ orally  - IV iron loading per Dr. Carrion  - Renal ADA diet + protein supplements  - Will follow Hya-nhnxg-ufxwu-acid/base and I&O  - Other problems per admitting                  "

## 2023-06-25 NOTE — PLAN OF CARE
Problem: Adult Inpatient Plan of Care  Goal: Plan of Care Review  Outcome: Ongoing, Progressing  Goal: Patient-Specific Goal (Individualized)  Outcome: Ongoing, Progressing  Goal: Absence of Hospital-Acquired Illness or Injury  Outcome: Ongoing, Progressing  Goal: Optimal Comfort and Wellbeing  Outcome: Ongoing, Progressing  Goal: Readiness for Transition of Care  Outcome: Ongoing, Progressing     Problem: Skin Injury Risk Increased  Goal: Skin Health and Integrity  Outcome: Ongoing, Progressing     Problem: Fluid and Electrolyte Imbalance (Acute Kidney Injury/Impairment)  Goal: Fluid and Electrolyte Balance  Outcome: Ongoing, Progressing     Problem: Oral Intake Inadequate (Acute Kidney Injury/Impairment)  Goal: Optimal Nutrition Intake  Outcome: Ongoing, Progressing     Problem: Renal Function Impairment (Acute Kidney Injury/Impairment)  Goal: Effective Renal Function  Outcome: Ongoing, Progressing     Problem: Impaired Wound Healing  Goal: Optimal Wound Healing  Outcome: Ongoing, Progressing     Problem: Fall Injury Risk  Goal: Absence of Fall and Fall-Related Injury  Outcome: Ongoing, Progressing     Problem: Urinary Retention  Goal: Effective Urinary Elimination  Outcome: Ongoing, Progressing   Patient remained free from falls and injuries throughout shift. Complained of pain and medicated as ordered. Remained on 2 L oxygen via nasal canula. Wound care completed as ordered. Call light within reach. Safety precautions maintained. Will continue to monitor.

## 2023-06-25 NOTE — NURSING
[facility] [place]  Nurses Note -- 4 Eyes      6/24/2023       Skin assessed on: Q Shift      [x] No Pressure Injuries Present    [x]Prevention Measures Documented    [] Yes LDA  for Pressure Injury Previously documented     [] Yes New Pressure Injury Discovered   [] LDA for New Pressure Injury Added      Attending RN:  Sakina Euceda LPN     Second RN:  SUSIE Ellison

## 2023-06-25 NOTE — CARE UPDATE
Found pt on RA with NC still in nose o2 flow meter off when I arrived to do pt tx. I asked pt if she knew if someone turned her o2 off and she said no. RA sarts were 75%. Placed pt back on original 2 liters. RT was not aware or notified that pt was on a room air trial.  Pt sats were monitored till they lex to 96% and then proceeded to give pt a breathing tx.

## 2023-06-25 NOTE — SUBJECTIVE & OBJECTIVE
Interval History: Feels improved this AM    Review of Systems   Constitutional:  Negative for chills and fever.   Respiratory:  Negative for cough and shortness of breath.    Cardiovascular:  Negative for chest pain and leg swelling.   Gastrointestinal:  Negative for abdominal distention and abdominal pain.   Objective:     Vital Signs (Most Recent):  Temp: 98.1 °F (36.7 °C) (06/25/23 0804)  Pulse: 80 (06/25/23 0804)  Resp: 16 (06/25/23 0804)  BP: 134/63 (06/25/23 0804)  SpO2: 99 % (06/25/23 0804) Vital Signs (24h Range):  Temp:  [97.7 °F (36.5 °C)-98.4 °F (36.9 °C)] 98.1 °F (36.7 °C)  Pulse:  [75-84] 80  Resp:  [16-20] 16  SpO2:  [91 %-99 %] 99 %  BP: (118-134)/(59-68) 134/63     Weight: 60.3 kg (132 lb 15 oz)  Body mass index is 22.12 kg/m².    Intake/Output Summary (Last 24 hours) at 6/25/2023 1005  Last data filed at 6/25/2023 0840  Gross per 24 hour   Intake 120 ml   Output --   Net 120 ml         Physical Exam  Constitutional:       General: She is not in acute distress.     Appearance: She is ill-appearing. She is not toxic-appearing or diaphoretic.   Cardiovascular:      Rate and Rhythm: Normal rate and regular rhythm.      Heart sounds: No murmur heard.    No gallop.   Pulmonary:      Effort: Pulmonary effort is normal. No respiratory distress.      Breath sounds: No wheezing or rales.   Abdominal:      General: Bowel sounds are normal. There is distension.      Palpations: Abdomen is soft.      Tenderness: There is no abdominal tenderness.           Significant Labs: All pertinent labs within the past 24 hours have been reviewed.    Significant Imaging: I have reviewed all pertinent imaging results/findings within the past 24 hours.

## 2023-06-25 NOTE — NURSING
PER handoff received from SUSIE Kunz     Pt resting in bed quietly. NAD noted.   Fall and safety precautions maintained. Bed alarm activated and audible.. Bed locked in lowest position, with side rails up x2. Call bell and personal items within reach

## 2023-06-25 NOTE — PROGRESS NOTES
[facility] Medicine  Progress Note    Patient Name: Nelsy Marino  MRN: [id number]  Patient Class: IP- Inpatient   Admission Date: 6/20/2023  Length of Stay: 3 days  Attending Physician: Cuong Swanson MD  Primary Care Provider: [facility]        Subjective:     Principal Problem:Acute on chronic diastolic heart failure        HPI:  Nelsy Marino 82 y.o. female with HFpEF, HTN, PAD S/P Fem/fem bypass on chronic suppressive abx presents to the hospital with a chief complaint of SOB.  She reports she developed shortness of breath last night that she attempted treatment for at home her home oxygen without improvement.  After she began wearing her nasal cannula she developed epistaxis which has resolved without intervention.  She continues to feel short of breath.  She reports she is not on a diuretic outpatient.  She and her  deny any salty food intake.  She denies fever chest pain nausea vomiting abdominal pain melena hematuria hematemesis productive and sick contacts.    In the ED, hypoxic to 87% on room air chest x-ray with pulmonary edema and bilateral pleural effusions BNP at 906 troponin negative lactic acid negative COVID negative flu negative creatinine 1.8 afebrile without leukocytosis.      Overview/Hospital Course:  Admission to observation for acute on chronic diastolic heart failure. IV lasix 40 mg started with mild improvement in symptoms and continue to require oxygen supplementation. Noted LOLY worsen also. Renal US showed bilateral cortical thinning, no mass, stone or hydronephrosis. Patient have chronic left foot pain however due to history of DVT, US BLE which showed no DVT.   Will place wound care orders for chronic left groin dime size open wound, chronic left heel unstagable pressure injury, and right TMA. Wound Care consulted to dress and treat wounds.  Patient does have home oxygen but stop using few months ago. No dx of COPD and quit smoking 4 years ago.  Obtain CT chest without contrast and repeat RFP now. Despite IV diuresis, urine output noted to be 350 mL in 24 hours. Nephrology consulted for further recommendations on management of oliguric LOLY.      Interval History: Feels improved this AM    Review of Systems   Constitutional:  Negative for chills and fever.   Respiratory:  Negative for cough and shortness of breath.    Cardiovascular:  Negative for chest pain and leg swelling.   Gastrointestinal:  Negative for abdominal distention and abdominal pain.   Objective:     Vital Signs (Most Recent):  Temp: 98.1 °F (36.7 °C) (06/25/23 0804)  Pulse: 80 (06/25/23 0804)  Resp: 16 (06/25/23 0804)  BP: 134/63 (06/25/23 0804)  SpO2: 99 % (06/25/23 0804) Vital Signs (24h Range):  Temp:  [97.7 °F (36.5 °C)-98.4 °F (36.9 °C)] 98.1 °F (36.7 °C)  Pulse:  [75-84] 80  Resp:  [16-20] 16  SpO2:  [91 %-99 %] 99 %  BP: (118-134)/(59-68) 134/63     Weight: 60.3 kg (132 lb 15 oz)  Body mass index is 22.12 kg/m².    Intake/Output Summary (Last 24 hours) at 6/25/2023 1005  Last data filed at 6/25/2023 0840  Gross per 24 hour   Intake 120 ml   Output --   Net 120 ml         Physical Exam  Constitutional:       General: She is not in acute distress.     Appearance: She is ill-appearing. She is not toxic-appearing or diaphoretic.   Cardiovascular:      Rate and Rhythm: Normal rate and regular rhythm.      Heart sounds: No murmur heard.    No gallop.   Pulmonary:      Effort: Pulmonary effort is normal. No respiratory distress.      Breath sounds: No wheezing or rales.   Abdominal:      General: Bowel sounds are normal. There is distension.      Palpations: Abdomen is soft.      Tenderness: There is no abdominal tenderness.           Significant Labs: All pertinent labs within the past 24 hours have been reviewed.    Significant Imaging: I have reviewed all pertinent imaging results/findings within the past 24 hours.      Assessment/Plan:      * Acute on chronic diastolic heart  failure  Echo 3/2023 with EF of 65% and grade 2 DD. With elevated BNP, pulmonary edema and effusions on x-ray. Not on diuretic outpt.   Daily weights/strict intake and output  Attempted to Wean supplemental oxygen and desaturation of 88% on RA noted; continue supplemental oxygenation as needed.  Home coreg held as BP on lower limits of normal  Mild improvement with lasix overnight Possible underlying COPD; add duoneb  Renal function worsen; nephrology consulted for further recommendations of oliguric LOLY (cardiorenal syndrome?)    Constipation  Start bowel regimen      Urinary retention  Pt w/ urinary retention.   - treat constipation  - reassess  - straight cath until then      Wounds, multiple  PTA, HH wound care twice a week    1. left groin dime size open wound-cleanse with vashe, then apply Aquacel Ag and cover with Mepilex 4x4  2. chronic left heel unstagable pressure injury-cleanse with vashe and cover with mepilex 6x6  3.  Right TMA -cleanse with vashe, apply betadine cover with dry gauze secure with Kerlix and ACE  Green boot to off load BLE  Wound care consulted    Acute hypoxemic respiratory failure  Hypoxic to 87% on RA on arrival, reports has PRN home oxygen. Chest x-ray with pulmonary findings suggesting edema and small pleural effusion  Started on diuresis  Attempted to wean supplemental oxygen, with desaturation of 88% noted on RA.  Seen above #1    Hypokalemia  Potassium of 3.2 on arrival. Will replete oral and check Mg  Resolved    Debility  Uses wheelchair at baseline, due to foot wounds.    LOLY (acute kidney injury)  Presents with Cr of 1.8. baseline of 0.9. with signs of volume overload on admission  6/21nt does have home oxygen but stop using few months ago. No dx of COPD and quit smoking 4 years ago. Obtain CT chest without contrast and repeat RFP now.   LOLY 1.8>2.0  Renal US showed bilateral cortical thinning, no mass, stone or hydronephrosis.   Check UA/urine protein/creatinine ratio as  previous UA with proteinuria  Hold lasix pending RFP and CT chest  If no improvement, consult Nephrology  sCr1.9 stable  UPCR 0.10  Despite IV lasix UPO  350ml in approx 24 hrs  Nephrology consulted for further recommendations of oliguric LOLY; appreciate recommendations.    06/22: Cr noted to be 2.4 on AM labs.    Now improving. Unclear if this is due to diuresis or resolution of ATN of unknown etiology or relief of urinary obstruction.    Resolved    Anemia  Presents with hemoglobin of 8.3. previous baseline near 9. Denies bleeding no indication for transfusion at this time  History of AVMs no longer on ASA/plavix  Continue to monitor    Type 2 diabetes mellitus with chronic kidney disease, with long-term current use of insulin  Patient's FSGs are controlled on current medication regimen.  Last A1c reviewed-   Lab Results   Component Value Date    HGBA1C 6.5 (H) 06/20/2023     Most recent fingerstick glucose reviewed-   Recent Labs   Lab 06/24/23  1055 06/24/23  1635 06/24/23  1942   POCTGLUCOSE 124* 125* 146*     Current correctional scale  Low  Maintain anti-hyperglycemic dose as follows-   Antihyperglycemics (From admission, onward)    Start     Stop Route Frequency Ordered    06/20/23 1851  insulin aspart U-100 pen 0-5 Units         -- SubQ Before meals & nightly PRN 06/20/23 1752        Hold Oral hypoglycemics while patient is in the hospital.    Essential hypertension  BP well controlled. Resume home coreg/hydralazine if BP elevates    PAD (peripheral artery disease)  S/P fem/fem bypass 3/3023. Did not tolerate aspirin/plavix to due GI bleeding per chart review  contineu home statin. 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   Continue home keflex and fluconazole (QTc 473 today ranges 415-453)      VTE Risk Mitigation (From admission, onward)         Ordered     Place GABE hose  Until discontinued         06/20/23 1839     IP VTE HIGH RISK PATIENT  Once         06/20/23 3841      Place sequential compression device  Until discontinued         06/20/23 1749                Discharge Planning   JUAN ALBERTO: 6/26/2023     Code Status: Full Code   Is the patient medically ready for discharge?:     Reason for patient still in hospital (select all that apply): Patient trending condition, Laboratory test, Treatment, Consult recommendations and Pending disposition  Discharge Plan A: Home with family                  Cuong Swanson MD  Department of [facility] Medicine   [facility]

## 2023-06-25 NOTE — NURSING
PER handoff given to STEPHANE Presley     Pt resting in bed quietly. NAD noted. No c/o pain.  Fall and safety precautions maintained. Bed alarm activated and audible.. Bed locked in lowest position, with side rails up x2. Call bell and personal items within reach

## 2023-06-25 NOTE — NURSING
Per respiratory, patient placed back on 2 L upon finding her at 75% on room air. Rn not notified at time of change. Respiratory called and Dr Swanson notified

## 2023-06-25 NOTE — ASSESSMENT & PLAN NOTE
Patient's FSGs are controlled on current medication regimen.  Last A1c reviewed-   Lab Results   Component Value Date    HGBA1C 6.5 (H) 06/20/2023     Most recent fingerstick glucose reviewed-   Recent Labs   Lab 06/24/23  1055 06/24/23  1635 06/24/23  1942   POCTGLUCOSE 124* 125* 146*     Current correctional scale  Low  Maintain anti-hyperglycemic dose as follows-   Antihyperglycemics (From admission, onward)    Start     Stop Route Frequency Ordered    06/20/23 1851  insulin aspart U-100 pen 0-5 Units         -- SubQ Before meals & nightly PRN 06/20/23 1752        Hold Oral hypoglycemics while patient is in the hospital.

## 2023-06-26 VITALS
TEMPERATURE: 98 F | WEIGHT: 132.94 LBS | SYSTOLIC BLOOD PRESSURE: 117 MMHG | RESPIRATION RATE: 18 BRPM | HEIGHT: 65 IN | HEART RATE: 87 BPM | DIASTOLIC BLOOD PRESSURE: 58 MMHG | BODY MASS INDEX: 22.15 KG/M2 | OXYGEN SATURATION: 95 %

## 2023-06-26 LAB — POCT GLUCOSE: 87 MG/DL (ref 70–110)

## 2023-06-26 PROCEDURE — 25000003 PHARM REV CODE 250: Performed by: PHYSICIAN ASSISTANT

## 2023-06-26 PROCEDURE — 25000242 PHARM REV CODE 250 ALT 637 W/ HCPCS: Performed by: NURSE PRACTITIONER

## 2023-06-26 PROCEDURE — 94640 AIRWAY INHALATION TREATMENT: CPT

## 2023-06-26 PROCEDURE — 27000221 HC OXYGEN, UP TO 24 HOURS

## 2023-06-26 PROCEDURE — 25000003 PHARM REV CODE 250: Performed by: NURSE PRACTITIONER

## 2023-06-26 PROCEDURE — 63600175 PHARM REV CODE 636 W HCPCS: Performed by: INTERNAL MEDICINE

## 2023-06-26 PROCEDURE — 94761 N-INVAS EAR/PLS OXIMETRY MLT: CPT

## 2023-06-26 PROCEDURE — 25000003 PHARM REV CODE 250: Performed by: STUDENT IN AN ORGANIZED HEALTH CARE EDUCATION/TRAINING PROGRAM

## 2023-06-26 RX ORDER — FUROSEMIDE 40 MG/1
40 TABLET ORAL DAILY
Qty: 30 TABLET | Refills: 11 | Status: ON HOLD | OUTPATIENT
Start: 2023-06-26 | End: 2023-08-03 | Stop reason: HOSPADM

## 2023-06-26 RX ORDER — SENNOSIDES 8.6 MG/1
1 TABLET ORAL DAILY
Qty: 30 TABLET | Refills: 11 | Status: ON HOLD | OUTPATIENT
Start: 2023-06-26 | End: 2023-08-03 | Stop reason: HOSPADM

## 2023-06-26 RX ADMIN — BUMETANIDE 1 MG: 0.25 INJECTION INTRAMUSCULAR; INTRAVENOUS at 09:06

## 2023-06-26 RX ADMIN — IPRATROPIUM BROMIDE AND ALBUTEROL SULFATE 3 ML: .5; 3 SOLUTION RESPIRATORY (INHALATION) at 07:06

## 2023-06-26 RX ADMIN — IRON SUCROSE 200 MG: 20 INJECTION, SOLUTION INTRAVENOUS at 09:06

## 2023-06-26 RX ADMIN — ACETAMINOPHEN 650 MG: 325 TABLET ORAL at 02:06

## 2023-06-26 RX ADMIN — IPRATROPIUM BROMIDE AND ALBUTEROL SULFATE 3 ML: .5; 3 SOLUTION RESPIRATORY (INHALATION) at 12:06

## 2023-06-26 RX ADMIN — OXYCODONE AND ACETAMINOPHEN 1 TABLET: 5; 325 TABLET ORAL at 10:06

## 2023-06-26 RX ADMIN — CEPHALEXIN 500 MG: 500 CAPSULE ORAL at 09:06

## 2023-06-26 NOTE — PLAN OF CARE
06/26/23 1006   Medicare Message   Important Message from Medicare regarding Discharge Appeal Rights Given to patient/caregiver;Explained to patient/caregiver;Signed/date by patient/caregiver   Date IMM was signed 06/26/23   Time IMM was signed 1000

## 2023-06-26 NOTE — PLAN OF CARE
Problem: Adult Inpatient Plan of Care  Goal: Plan of Care Review  Outcome: Ongoing, Progressing     Problem: Skin Injury Risk Increased  Goal: Skin Health and Integrity  Outcome: Ongoing, Progressing     Problem: Diabetes Comorbidity  Goal: Blood Glucose Level Within Targeted Range  Outcome: Ongoing, Progressing     Problem: Fluid and Electrolyte Imbalance (Acute Kidney Injury/Impairment)  Goal: Fluid and Electrolyte Balance  Outcome: Ongoing, Progressing     Problem: Oral Intake Inadequate (Acute Kidney Injury/Impairment)  Goal: Optimal Nutrition Intake  Outcome: Ongoing, Progressing     Problem: Renal Function Impairment (Acute Kidney Injury/Impairment)  Goal: Effective Renal Function  Outcome: Ongoing, Progressing     Problem: Impaired Wound Healing  Goal: Optimal Wound Healing  Outcome: Ongoing, Progressing     Problem: Fall Injury Risk  Goal: Absence of Fall and Fall-Related Injury  Outcome: Ongoing, Progressing     Problem: Urinary Retention  Goal: Effective Urinary Elimination  Outcome: Ongoing, Progressing      home

## 2023-06-26 NOTE — NURSING
Received report from SUSIE Kunz. Patient lying in bed resting, NAD noted. Safety Precautions maintained, Will Monitor.

## 2023-06-26 NOTE — PLAN OF CARE
Problem: Adult Inpatient Plan of Care  Goal: Plan of Care Review  Outcome: Met  Goal: Patient-Specific Goal (Individualized)  Outcome: Met  Goal: Absence of Hospital-Acquired Illness or Injury  Outcome: Met  Goal: Optimal Comfort and Wellbeing  Outcome: Met  Goal: Readiness for Transition of Care  Outcome: Met     Problem: Skin Injury Risk Increased  Goal: Skin Health and Integrity  Outcome: Met     Problem: Diabetes Comorbidity  Goal: Blood Glucose Level Within Targeted Range  Outcome: Met     Problem: Fluid and Electrolyte Imbalance (Acute Kidney Injury/Impairment)  Goal: Fluid and Electrolyte Balance  Outcome: Met     Problem: Oral Intake Inadequate (Acute Kidney Injury/Impairment)  Goal: Optimal Nutrition Intake  Outcome: Met     Problem: Renal Function Impairment (Acute Kidney Injury/Impairment)  Goal: Effective Renal Function  Outcome: Met     Problem: Impaired Wound Healing  Goal: Optimal Wound Healing  Outcome: Met     Problem: Fall Injury Risk  Goal: Absence of Fall and Fall-Related Injury  Outcome: Met     Problem: Urinary Retention  Goal: Effective Urinary Elimination  Outcome: Met

## 2023-06-26 NOTE — PLAN OF CARE
[facility] - Telemetry      HOME HEALTH ORDERS  FACE TO FACE ENCOUNTER    Patient Name: Nelsy Marino  YOB: 1940    PCP: Juan F Garay   PCP Address: [address]  PCP Phone Number: [phone]  PCP Fax: [phone]    Encounter Date: 6/20/23    Admit to Home Health    Diagnoses:  Active Hospital Problems    Diagnosis  POA    *Acute on chronic diastolic heart failure [I50.33]  Yes    Urinary retention [R33.9]  No    Constipation [K59.00]  No    Wounds, multiple [T07.XXXA]  Yes    Acute hypoxemic respiratory failure [J96.01]  Yes    Hypokalemia [E87.6]  Yes    LOLY (acute kidney injury) [N17.9]  Yes    Debility [R53.81]  Yes    Anemia [D64.9]  Yes    Essential hypertension [I10]  Yes     Chronic    PAD (peripheral artery disease) [I73.9]  Yes     Chronic     Patient did not tolerate aspirin or plavix due to GI bleeding      Type 2 diabetes mellitus with chronic kidney disease, with long-term current use of insulin [E11.22, Z79.4]  Not Applicable     Chronic      Resolved Hospital Problems   No resolved problems to display.       Follow Up Appointments:  Future Appointments   Date Time Provider Department Center   6/28/2023 11:15 AM [facility] [place]   7/18/2023 11:00 AM Gaby Walker [facility] GASTRO [facility]   7/27/2023  8:00 AM LAB, [facility] LAB [facility] [place]   8/4/2023  1:30 PM VASCULAR ULTRASOUND, [facility] EKG [facility] [place]   8/4/2023  2:30 PM VASCULAR ULTRASOUND, [facility] EKG [facility] [place]   8/8/2023  2:15 PM Danny Dunbar MD [facility] VAS [facility]       Allergies:  Review of patient's allergies indicates:   Allergen Reactions    Motrin [ibuprofen] Itching       Medications: Review discharge medications with patient and family and provide education.    Current Facility-Administered Medications   Medication Dose Route Frequency Provider Last Rate Last Admin    acetaminophen tablet 650 mg  650 mg Oral Q6H PRN Jude Showjerilyn,  PA-C   650 mg at 06/26/23 0251    albuterol-ipratropium 2.5 mg-0.5 mg/3 mL nebulizer solution 3 mL  3 mL Nebulization Q6H Leeann Garza NP   3 mL at 06/26/23 0742    atorvastatin tablet 40 mg  40 mg Oral QHS Jude Showers, PA-C   40 mg at 06/25/23 2119    bumetanide injection 1 mg  1 mg Intravenous BID Cuong Swanson MD   1 mg at 06/25/23 2119    cephALEXin capsule 500 mg  500 mg Oral Q12H Jude Showers, PA-C   500 mg at 06/25/23 2119    dextrose 50% injection 12.5 g  12.5 g Intravenous PRN Jude Showers, PA-C        dextrose 50% injection 25 g  25 g Intravenous PRN Jude Showers, PA-ROSS        glucagon (human recombinant) injection 1 mg  1 mg Intramuscular PRN Jude Showers, PA-C        glucose chewable tablet 16 g  16 g Oral PRN Jude Showers, PA-C        glucose chewable tablet 24 g  24 g Oral PRN Jude Showers, PA-C        insulin aspart U-100 pen 0-5 Units  0-5 Units Subcutaneous QID (AC + HS) PRN Jude ShowTIFFANIE jean-baptiste-C   2 Units at 06/22/23 1708    iron sucrose injection 200 mg  200 mg Intravenous Every other day Sheba Carrion MD   200 mg at 06/24/23 0822    melatonin tablet 6 mg  6 mg Oral Nightly PRN Jude Showjerilyn, PA-C   6 mg at 06/25/23 2127    oxyCODONE-acetaminophen 5-325 mg per tablet 1 tablet  1 tablet Oral Q8H PRN Leeann Garza NP   1 tablet at 06/25/23 2119    polyethylene glycol packet 17 g  17 g Oral Daily Cuong Swanson MD   17 g at 06/25/23 0843    promethazine (PHENERGAN) 12.5 mg in dextrose 5 % (D5W) 50 mL IVPB  12.5 mg Intravenous Q6H PRN Leeann Garza NP   Stopped at 06/22/23 1122    senna tablet 8.6 mg  8.6 mg Oral BID Cuong Swanson MD   8.6 mg at 06/25/23 2120    senna-docusate 8.6-50 mg per tablet 1 tablet  1 tablet Oral Daily PRN Jude Basurto PA-C        sodium chloride 0.9% flush 10 mL  10 mL Intravenous PRN Jude Basurto PA-C         Current Discharge Medication List        START taking these medications    Details   furosemide (LASIX) 40 MG tablet Take 1  "tablet (40 mg total) by mouth once daily.  Qty: 30 tablet, Refills: 11      senna (SENOKOT) 8.6 mg tablet Take 1 tablet by mouth once daily.  Qty: 30 tablet, Refills: 11           CONTINUE these medications which have NOT CHANGED    Details   acetaminophen (TYLENOL) 500 MG tablet Take 2 tablets (1,000 mg total) by mouth every 8 (eight) hours.  Refills: 0      amLODIPine (NORVASC) 10 MG tablet Take 1 tablet (10 mg total) by mouth once daily.  Qty: 90 tablet, Refills: 0    Comments: .      atorvastatin (LIPITOR) 40 MG tablet Take 1 tablet (40 mg total) by mouth every evening.  Qty: 30 tablet, Refills: 0      BLOOD SUGAR DIAGNOSTIC (TRUE METRIX GLUCOSE TEST STRIP MISC) by Misc.(Non-Drug; Combo Route) route.      carvediloL (COREG) 25 MG tablet Take 1 tablet (25 mg total) by mouth 2 (two) times daily.  Qty: 60 tablet, Refills: 0    Comments: .      cephALEXin (KEFLEX) 500 MG capsule Take 1 capsule (500 mg total) by mouth every 12 (twelve) hours.  Qty: 60 capsule, Refills: 11    Associated Diagnoses: Vascular graft infection, subsequent encounter      diphenhydrAMINE (BENADRYL) 25 mg capsule Take 25 mg by mouth daily as needed for Itching.      DULoxetine (CYMBALTA) 30 MG capsule Take 1 capsule (30 mg total) by mouth 2 (two) times daily.  Qty: 60 capsule, Refills: 0      fluconazole (DIFLUCAN) 200 MG Tab Take 2 tablets (400 mg total) by mouth once daily.  Qty: 60 tablet, Refills: 11    Associated Diagnoses: Vascular graft infection, subsequent encounter      hydrALAZINE (APRESOLINE) 25 MG tablet Take 3 tablets (75 mg total) by mouth every 8 (eight) hours.  Qty: 270 tablet, Refills: 11    Comments: .      insulin detemir U-100, Levemir, 100 unit/mL (3 mL) SubQ InPn pen Inject 5 Units into the skin every evening.  Qty: 6 mL, Refills: 3      insulin syringe-needle,dispos. 0.3 mL 30 gauge x 5/16" Syrg by Misc.(Non-Drug; Combo Route) route.      oxyCODONE (ROXICODONE) 5 MG immediate release tablet Take 1 tablet (5 mg total) " "by mouth every 12 (twelve) hours as needed.  Qty: 14 tablet, Refills: 0    Comments: Quantity prescribed more than 7 day supply? No      oxyCODONE-acetaminophen (PERCOCET) 5-325 mg per tablet Take 1 tablet by mouth every 8 (eight) hours as needed for Pain.  Qty: 30 tablet, Refills: 0    Comments: Quantity prescribed more than 7 day supply? Yes, quantity medically necessary      pantoprazole (PROTONIX) 40 MG tablet Take 1 tablet (40 mg total) by mouth once daily.  Qty: 90 tablet, Refills: 0      pen needle, diabetic 32 gauge x 5/32" Ndle Inject 1 each into the skin 4 (four) times daily.  Qty: 100 each, Refills: 0      vitamin D (VITAMIN D3) 1000 units Tab Take 1 tablet (1,000 Units total) by mouth once daily.               I have seen and examined this patient within the last 30 days. My clinical findings that support the need for the home health skilled services and home bound status are the following:no   Weakness/numbness causing balance and gait disturbance due to Heart Failure, Infection, Weakness/Debility, and Surgery making it taxing to leave home.  Requiring assistive device to leave home due to unsteady gait caused by  Heart Failure, Infection, and Weakness/Debility.     Diet:   cardiac diet    Labs:      Referrals/ Consults  Physical Therapy to evaluate and treat. Evaluate for home safety and equipment needs; Establish/upgrade home exercise program. Perform / instruct on therapeutic exercises, gait training, transfer training, and Range of Motion.  Occupational Therapy to evaluate and treat. Evaluate home environment for safety and equipment needs. Perform/Instruct on transfers, ADL training, ROM, and therapeutic exercises.    Activities:   activity as tolerated    Nursing:   Agency to admit patient within 24 hours of hospital discharge unless specified on physician order or at patient request    SN to complete comprehensive assessment including routine vital signs. Instruct on disease process and s/s of " complications to report to MD. Review/verify medication list sent home with the patient at time of discharge  and instruct patient/caregiver as needed. Frequency may be adjusted depending on start of care date.     Skilled nurse to perform up to 3 visits PRN for symptoms related to diagnosis    Notify MD if SBP > 160 or < 90; DBP > 90 or < 50; HR > 120 or < 50; Temp > 101; O2 < 88%; Other:       Ok to schedule additional visits based on staff availability and patient request on consecutive days within the home health episode.    When multiple disciplines ordered:    Start of Care occurs on Sunday - Wednesday schedule remaining discipline evaluations as ordered on separate consecutive days following the start of care.    Thursday SOC -schedule subsequent evaluations Friday and Monday the following week.     Friday - Saturday SOC - schedule subsequent discipline evaluations on consecutive days starting Monday of the following week.      Miscellaneous   Heart Failure:      SN to instruct on the following:    Instruct on the definition of CHF.   Instruct on the signs/sympoms of CHF to be reported.   Instruct on and monitor daily weights.   Instruct on factors that cause exacerbation.   Instruct on action, dose, schedule, and side effects of medications.   Instruct on diet as prescribed.   Instruct on activity allowed.   Instruct on life-style modifications for life long management of CHF   SN to assess compliance with daily weights, diet, medications, fluid retention,    safety precautions, activities permitted and life-style modifications.   Additional 1-2 SN visits per week as needed for signs and symptoms     of CHF exacerbation.      For Weight Gain > 2-3 lbs in 1 day or 4-6 lbs over 1 week notify PCP:  CHF DIURETIC SLIDING SCALE     Skilled nurse visits daily to instruct and monitor medication adherence until target weight. Then resume prior order frequency.     If weight gain exceeds 5 lbs over target weight, call  MD  If weight gain of 3-4 lbs over target weight, then:     Increase Furosemide - current dose (mg/day) to 40 BID double dose and frequency of twice daily until target weight achieved or maximum 3 days.     If already on max oral daily dose, see IV diuretic instructions.    After 3 days of increased oral diuretic dose, get BMP. If patient is on increased oral diuretic dose greater than 5 days, repeat BMP at day 7 of increased dose.     Potassium supplementation   Scr > 1.5 mg/dl  Scr  1.5 mg/dl    K < 3.0 - NOTIFY MD and 40 mEq bid  40 mEq tid   K- 3.1-3.3  20 mEq bid  20 mEq tid    K 3.4-3.7  10 mEq bid  10 mEq tid      If target weight not reached after 5 days of increased oral diuretic, contact MD       Home Oxygen:  No change      Wound Care Orders  1. left groin dime size open wound-cleanse with vashe, then apply Aquacel Ag and cover with Mepilex 4x4  2. chronic left heel unstagable pressure injury-cleanse with vashe and cover with mepilex 6x6  3.  Right TMA -cleanse with vashe, apply betadine cover with dry gauze secure cast wrap to pad and Kerlix and Coban for football type dressing.  4. Left knee wound- cleanse with Vashe and dress with Mepilex 3x3 dressing every 3 days    I certify that this patient is confined to her home and needs intermittent skilled nursing care, physical therapy, and occupational therapy.

## 2023-06-26 NOTE — NURSING
Reviewed all discharge instructions with patient, new medications, continued medications, follow-up appointments and signs/symptoms to report to PCP or seek emergency medical care. Patient verbalized understanding to all instructions and education. Patient denies any complaints or concerns at this time.

## 2023-06-26 NOTE — PROGRESS NOTES
Renal Progress Note    Date of Admission:  6/20/2023  4:23 PM    Length of Stay: 4  Days    Subjective: n/a    Objective:    Current Facility-Administered Medications   Medication    acetaminophen tablet 650 mg    albuterol-ipratropium 2.5 mg-0.5 mg/3 mL nebulizer solution 3 mL    atorvastatin tablet 40 mg    bumetanide injection 1 mg    cephALEXin capsule 500 mg    dextrose 50% injection 12.5 g    dextrose 50% injection 25 g    glucagon (human recombinant) injection 1 mg    glucose chewable tablet 16 g    glucose chewable tablet 24 g    insulin aspart U-100 pen 0-5 Units    iron sucrose injection 200 mg    melatonin tablet 6 mg    oxyCODONE-acetaminophen 5-325 mg per tablet 1 tablet    polyethylene glycol packet 17 g    promethazine (PHENERGAN) 12.5 mg in dextrose 5 % (D5W) 50 mL IVPB    senna tablet 8.6 mg    senna-docusate 8.6-50 mg per tablet 1 tablet    sodium chloride 0.9% flush 10 mL       Vitals:    06/26/23 0511 06/26/23 0731 06/26/23 0742 06/26/23 1015   BP: 128/61 (!) 117/58     BP Location: Left arm      Patient Position: Lying      Pulse: 93 85 87    Resp: 18 18 17 18   Temp: 98.4 °F (36.9 °C) 98.3 °F (36.8 °C)     TempSrc: Oral      SpO2: 97% 96% 95%    Weight:       Height:           I/O last 3 completed shifts:  In: 120 [P.O.:120]  Out: 1200 [Urine:1200]  No intake/output data recorded.      Physical Exam: unchanged      Laboratories:    No results for input(s): WBC, RBC, HGB, HCT, PLT, MCV, MCH, MCHC in the last 24 hours.    No results for input(s): GLUCOSE, CALCIUM, PROT, NA, K, CO2, CL, BUN, CREATININE, ALKPHOS, ALT, AST, BILITOT in the last 24 hours.    Invalid input(s):  ALBUMIN      No results for input(s): COLORU, CLARITYU, SPECGRAV, PHUR, PROTEINUA, GLUCOSEU, BLOODU, WBCU, RBCU, BACTERIA, MUCUS in the last 24 hours.    Invalid input(s):  BILIRUBINCON      Microbiology Results (last 7 days)       Procedure Component Value Units Date/Time    Blood culture #2 **CANNOT BE  "ORDERED STAT** [[id number]] Collected: 06/20/23 1721    Order Status: Completed Specimen: Blood Updated: 06/24/23 1903     Blood Culture, Routine No Growth after 4 days.    Blood culture #1 **CANNOT BE ORDERED STAT** [[id number]] Collected: 06/20/23 1754    Order Status: Completed Specimen: Blood Updated: 06/24/23 1903     Blood Culture, Routine No Growth after 4 days.              Diagnostic Tests: n/a        Assessment:    81 y/o female admitted with:    - LOLY  RESOLVED  - Fluid overload  - Acute on chronic Diast. HF  - Mild hypoK+  - DM2  - Diabetic foot wounds  - PAD s/p Fem/fem bypass  - Fe++ def. Anemia  - Hypoalbuminemia        Plan:    - "judicious" diuresis  - Replace K+ orally  - IV iron loading per Dr. Carrion, may switch to p.o. upon discharge  - Renal ADA diet + protein supplements  - Will follow Jwq-nrmxc-idjof-acid/base and I&O  - Discharge planning in progress  - Other problems per admitting      No further recommendations Renal-wise  Will sign off the case.                  "

## 2023-06-26 NOTE — PLAN OF CARE
Pt will resume home health with [facility]. CM notified nurse, Meri that pt is ready for discharge from CM standpoint. All CM needs met.     06/26/23 1017   Final Note   Assessment Type Final Discharge Note   Anticipated Discharge Disposition Home-Health   What phone number can be called within the next 1-3 days to see how you are doing after discharge? [phone]   Hospital Resources/Appts/Education Provided Appointments scheduled and added to AVS;Appointments scheduled by Navigator/Coordinator   Post-Acute Status   Post-Acute Authorization Home Health   Home Health Status Set-up Complete/Auth obtained   Coverage [employer]   Patient choice form signed by patient/caregiver List from System Post-Acute Care   Discharge Delays None known at this time

## 2023-06-26 NOTE — DISCHARGE SUMMARY
Call to schedule colonoscopy (due 8/2021) [facility] [id number] [id number] [facility] [address]  
[phone] [facility] Medicine  Discharge Summary      Patient Name: Nelsy Marino  MRN: [id number]  NICK: [id number]  Patient Class: IP- Inpatient  Admission Date: 6/20/2023  Hospital Length of Stay: 4 days  Discharge Date and Time: No discharge date for patient encounter.  Attending Physician: Cuong Swanson MD   Discharging Provider: Cuong Swanson MD  Primary Care Provider: [facility]    Primary Care Team: Networked reference to record PCT     HPI:   Nelsy Marino 82 y.o. female with HFpEF, HTN, PAD S/P Fem/fem bypass on chronic suppressive abx presents to the hospital with a chief complaint of SOB.  She reports she developed shortness of breath last night that she attempted treatment for at home her home oxygen without improvement.  After she began wearing her nasal cannula she developed epistaxis which has resolved without intervention.  She continues to feel short of breath.  She reports she is not on a diuretic outpatient.  She and her  deny any salty food intake.  She denies fever chest pain nausea vomiting abdominal pain melena hematuria hematemesis productive and sick contacts.    In the ED, hypoxic to 87% on room air chest x-ray with pulmonary edema and bilateral pleural effusions BNP at 906 troponin negative lactic acid negative COVID negative flu negative creatinine 1.8 afebrile without leukocytosis.      * No surgery found *      Hospital Course:   Admitted for decompensated HFpEF and LOLY. Initial response to diuretics poor, but with more aggressive diuresis pt was weaned from oxygen and her LOLY resolved. Hospital course complicated by urinary retention due to constipation, both of which resolved prior to dc. Discharged w/ home health/HF monitoring, and will follow up w/ PCP.       Goals of Care Treatment Preferences:  Code Status: Full Code      Consults:   Consults (From admission, onward)        Status Ordering Provider     Inpatient consult to Interventional Radiology   Once        Provider:  Den Brandon MD    Completed JHONNY LEE     Inpatient consult to Nephrology  Once        Provider:  Sheba Carrion MD    Completed KG MELENDEZ     Inpatient consult to Social Work/Case Management  Once        Provider:  (Not yet assigned)    KG Lares     Inpatient consult to Registered Dietitian/Nutritionist  Once        Provider:  (Not yet assigned)    KG Lares          No new Assessment & Plan notes have been filed under this hospital service since the last note was generated.  Service: Hospital Medicine    Final Active Diagnoses:    Diagnosis Date Noted POA    PRINCIPAL PROBLEM:  Acute on chronic diastolic heart failure [I50.33] 06/20/2023 Yes    Urinary retention [R33.9] 06/24/2023 No    Constipation [K59.00] 06/24/2023 No    Wounds, multiple [T07.XXXA] 06/21/2023 Yes    Acute hypoxemic respiratory failure [J96.01] 06/20/2023 Yes    Hypokalemia [E87.6] 05/16/2023 Yes    LOLY (acute kidney injury) [N17.9] 04/21/2023 Yes    Debility [R53.81] 04/21/2023 Yes    Anemia [D64.9] 04/08/2023 Yes    Essential hypertension [I10] 03/20/2019 Yes     Chronic    PAD (peripheral artery disease) [I73.9] 03/20/2019 Yes     Chronic    Type 2 diabetes mellitus with chronic kidney disease, with long-term current use of insulin [E11.22, Z79.4] 03/20/2019 Not Applicable     Chronic      Problems Resolved During this Admission:       Discharged Condition: good    Disposition: Home-Health Care [facility]    Follow Up:   Follow-up Information     [facility]. Go today.    Why: Virtual appointment already scheduled for TODAY at 3:50pm  Nurse will call 30 - 1 hour to see iof you are ready and available  Contact information:  [address]O [id number] [phone]                       Patient Instructions:   No discharge procedures on file.    Significant Diagnostic Studies: N/A    Pending Diagnostic Studies:     None         Medications:  Reconciled  "Home Medications:      Medication List      START taking these medications    furosemide 40 MG tablet  Commonly known as: LASIX  Take 1 tablet (40 mg total) by mouth once daily.     senna 8.6 mg tablet  Commonly known as: SENOKOT  Take 1 tablet by mouth once daily.        CONTINUE taking these medications    acetaminophen 500 MG tablet  Commonly known as: TYLENOL  Take 2 tablets (1,000 mg total) by mouth every 8 (eight) hours.     atorvastatin 40 MG tablet  Commonly known as: LIPITOR  Take 1 tablet (40 mg total) by mouth every evening.     BD SHERINE 2ND GEN PEN NEEDLE 32 gauge x 5/32" Ndle  Generic drug: pen needle, diabetic  Inject 1 each into the skin 4 (four) times daily.     cephALEXin 500 MG capsule  Commonly known as: KEFLEX  Take 1 capsule (500 mg total) by mouth every 12 (twelve) hours.     diphenhydrAMINE 25 mg capsule  Commonly known as: BENADRYL  Take 25 mg by mouth daily as needed for Itching.     fluconazole 200 MG Tab  Commonly known as: DIFLUCAN  Take 2 tablets (400 mg total) by mouth once daily.     insulin syringe-needle,dispos. 0.3 mL 30 gauge x 5/16" Syrg  by Misc.(Non-Drug; Combo Route) route.     LEVEMIR FLEXPEN 100 unit/mL (3 mL) Inpn pen  Generic drug: insulin detemir U-100 (Levemir)  Inject 5 Units into the skin every evening.     oxyCODONE 5 MG immediate release tablet  Commonly known as: ROXICODONE  Take 1 tablet (5 mg total) by mouth every 12 (twelve) hours as needed.     oxyCODONE-acetaminophen 5-325 mg per tablet  Commonly known as: PERCOCET  Take 1 tablet by mouth every 8 (eight) hours as needed for Pain.     pantoprazole 40 MG tablet  Commonly known as: PROTONIX  Take 1 tablet (40 mg total) by mouth once daily.     TRUE METRIX GLUCOSE TEST STRIP MISC  by Misc.(Non-Drug; Combo Route) route.     vitamin D 1000 units Tab  Commonly known as: VITAMIN D3  Take 1 tablet (1,000 Units total) by mouth once daily.        ASK your doctor about these medications    amLODIPine 10 MG tablet  Commonly " known as: NORVASC  Take 1 tablet (10 mg total) by mouth once daily.     carvediloL 25 MG tablet  Commonly known as: COREG  Take 1 tablet (25 mg total) by mouth 2 (two) times daily.     DULoxetine 30 MG capsule  Commonly known as: CYMBALTA  Take 1 capsule (30 mg total) by mouth 2 (two) times daily.     hydrALAZINE 25 MG tablet  Commonly known as: APRESOLINE  Take 3 tablets (75 mg total) by mouth every 8 (eight) hours.            Indwelling Lines/Drains at time of discharge:   Lines/Drains/Airways     Drain  Duration           Female External Urinary Catheter 06/20/23 1821 5 days                Time spent on the discharge of patient: 55 minutes         Cuong Swanson MD  Department of Hospital Medicine  [facility]

## 2023-06-26 NOTE — NURSING
[facility]  Nurses Note -- 4 Eyes      6/26/2023       Skin assessed on: Q Shift      [x] No Pressure Injuries Present    [x]Prevention Measures Documented    [] Yes LDA  for Pressure Injury Previously documented     [] Yes New Pressure Injury Discovered   [] LDA for New Pressure Injury Added      Attending RN:  Jada Avilez RN     Second RN:  Joaquina Sylvester RN

## 2023-06-26 NOTE — DISCHARGE SUMMARY
[facility] Medicine  Discharge Summary      Patient Name: Nelsy Marino  MRN: [id number]  NICK: [id number]  Patient Class: IP- Inpatient  Admission Date: 6/20/2023  Hospital Length of Stay: 4 days  Discharge Date and Time: No discharge date for patient encounter.  Attending Physician: Cuong Swanson MD   Discharging Provider: Cuong Swanson MD  Primary Care Provider: [facility]    Primary Care Team: Networked reference to record PCT     HPI:   Nelsy Marino 82 y.o. female with HFpEF, HTN, PAD S/P Fem/fem bypass on chronic suppressive abx presents to the hospital with a chief complaint of SOB.  She reports she developed shortness of breath last night that she attempted treatment for at home her home oxygen without improvement.  After she began wearing her nasal cannula she developed epistaxis which has resolved without intervention.  She continues to feel short of breath.  She reports she is not on a diuretic outpatient.  She and her  deny any salty food intake.  She denies fever chest pain nausea vomiting abdominal pain melena hematuria hematemesis productive and sick contacts.    In the ED, hypoxic to 87% on room air chest x-ray with pulmonary edema and bilateral pleural effusions BNP at 906 troponin negative lactic acid negative COVID negative flu negative creatinine 1.8 afebrile without leukocytosis.      * No surgery found *      Hospital Course:   Admitted for decompensated HFpEF and LOLY. Initial response to diuretics poor, but with more aggressive diuresis pt was weaned from oxygen and her LOLY resolved. Hospital course complicated by urinary retention due to constipation, both of which resolved prior to dc. Discharged w/ home health/HF monitoring, and will follow up w/ PCP.       Goals of Care Treatment Preferences:  Code Status: Full Code      Consults:   Consults (From admission, onward)        Status Ordering Provider     Inpatient consult to Interventional Radiology   Once        Provider:  Den Brandon MD    Completed JHONNY LEE     Inpatient consult to Nephrology  Once        Provider:  Sheba Carrion MD    Completed KG MELENDEZ     Inpatient consult to Social Work/Case Management  Once        Provider:  (Not yet assigned)    KG Lares     Inpatient consult to Registered Dietitian/Nutritionist  Once        Provider:  (Not yet assigned)    KG Lares          No new Assessment & Plan notes have been filed under this hospital service since the last note was generated.  Service: Hospital Medicine    Final Active Diagnoses:    Diagnosis Date Noted POA    PRINCIPAL PROBLEM:  Acute on chronic diastolic heart failure [I50.33] 06/20/2023 Yes    Urinary retention [R33.9] 06/24/2023 No    Constipation [K59.00] 06/24/2023 No    Wounds, multiple [T07.XXXA] 06/21/2023 Yes    Acute hypoxemic respiratory failure [J96.01] 06/20/2023 Yes    Hypokalemia [E87.6] 05/16/2023 Yes    LOLY (acute kidney injury) [N17.9] 04/21/2023 Yes    Debility [R53.81] 04/21/2023 Yes    Anemia [D64.9] 04/08/2023 Yes    Essential hypertension [I10] 03/20/2019 Yes     Chronic    PAD (peripheral artery disease) [I73.9] 03/20/2019 Yes     Chronic    Type 2 diabetes mellitus with chronic kidney disease, with long-term current use of insulin [E11.22, Z79.4] 03/20/2019 Not Applicable     Chronic      Problems Resolved During this Admission:       Discharged Condition: good    Disposition: Home-Health Care [place]    Follow Up:   Follow-up Information     [facility]. Go today.    Why: Virtual appointment already scheduled for TODAY at 3:50pm  Nurse will call 30 - 1 hour to see iof you are ready and available  Contact information:  Luzma DONATO [phone]                       Patient Instructions:   No discharge procedures on file.    Significant Diagnostic Studies: N/A  As above    Pending Diagnostic Studies:     None        "  Medications:  Reconciled Home Medications:      Medication List      START taking these medications    furosemide 40 MG tablet  Commonly known as: LASIX  Take 1 tablet (40 mg total) by mouth once daily.     senna 8.6 mg tablet  Commonly known as: SENOKOT  Take 1 tablet by mouth once daily.        CONTINUE taking these medications    acetaminophen 500 MG tablet  Commonly known as: TYLENOL  Take 2 tablets (1,000 mg total) by mouth every 8 (eight) hours.     atorvastatin 40 MG tablet  Commonly known as: LIPITOR  Take 1 tablet (40 mg total) by mouth every evening.     BD SHERINE 2ND GEN PEN NEEDLE 32 gauge x 5/32" Ndle  Generic drug: pen needle, diabetic  Inject 1 each into the skin 4 (four) times daily.     cephALEXin 500 MG capsule  Commonly known as: KEFLEX  Take 1 capsule (500 mg total) by mouth every 12 (twelve) hours.     diphenhydrAMINE 25 mg capsule  Commonly known as: BENADRYL  Take 25 mg by mouth daily as needed for Itching.     fluconazole 200 MG Tab  Commonly known as: DIFLUCAN  Take 2 tablets (400 mg total) by mouth once daily.     insulin syringe-needle,dispos. 0.3 mL 30 gauge x 5/16" Syrg  by Misc.(Non-Drug; Combo Route) route.     LEVEMIR FLEXPEN 100 unit/mL (3 mL) Inpn pen  Generic drug: insulin detemir U-100 (Levemir)  Inject 5 Units into the skin every evening.     oxyCODONE 5 MG immediate release tablet  Commonly known as: ROXICODONE  Take 1 tablet (5 mg total) by mouth every 12 (twelve) hours as needed.     oxyCODONE-acetaminophen 5-325 mg per tablet  Commonly known as: PERCOCET  Take 1 tablet by mouth every 8 (eight) hours as needed for Pain.     pantoprazole 40 MG tablet  Commonly known as: PROTONIX  Take 1 tablet (40 mg total) by mouth once daily.     TRUE METRIX GLUCOSE TEST STRIP MISC  by Misc.(Non-Drug; Combo Route) route.     vitamin D 1000 units Tab  Commonly known as: VITAMIN D3  Take 1 tablet (1,000 Units total) by mouth once daily.        ASK your doctor about these medications  "   amLODIPine 10 MG tablet  Commonly known as: NORVASC  Take 1 tablet (10 mg total) by mouth once daily.     carvediloL 25 MG tablet  Commonly known as: COREG  Take 1 tablet (25 mg total) by mouth 2 (two) times daily.     DULoxetine 30 MG capsule  Commonly known as: CYMBALTA  Take 1 capsule (30 mg total) by mouth 2 (two) times daily.     hydrALAZINE 25 MG tablet  Commonly known as: APRESOLINE  Take 3 tablets (75 mg total) by mouth every 8 (eight) hours.            Indwelling Lines/Drains at time of discharge:   Lines/Drains/Airways     Drain  Duration           Female External Urinary Catheter 06/20/23 1821 5 days                Time spent on the discharge of patient: 45 minutes         Cuong Swanson MD  Department of Hospital Medicine  [facility]

## 2023-06-26 NOTE — NURSING
[facility] [place] 4 [place]      6/25/2023       Skin assessed on: Q Shift      [x] No Pressure Injuries Present    [x]Prevention Measures Documented    [] Yes LDA  for Pressure Injury Previously documented     [] Yes New Pressure Injury Discovered   [] LDA for New Pressure Injury Added      Attending RN:  Joaquina Sylvester RN     Second RN:  SUSIE Ellison

## 2023-06-26 NOTE — NURSING
PER handoff given to SUSIE Kunz     Pt resting in bed quietly. NAD noted.   Fall and safety precautions maintained. Bed alarm activated and audible.. Bed locked in lowest position, with side rails up x2. Call bell and personal items within reach

## 2023-06-27 ENCOUNTER — PATIENT OUTREACH (OUTPATIENT)
Dept: ADMINISTRATIVE | Facility: CLINIC | Age: 83
End: 2023-06-27
Payer: MEDICARE

## 2023-06-27 ENCOUNTER — DOCUMENT SCAN (OUTPATIENT)
Dept: HOME HEALTH SERVICES | Facility: HOSPITAL | Age: 83
End: 2023-06-27
Payer: MEDICARE

## 2023-06-27 NOTE — PROGRESS NOTES
Patient confirmed she completed her HOSFU on 6/26/23 with [facility]. Patient no longer eligible for the call.

## 2023-06-28 ENCOUNTER — OFFICE VISIT (OUTPATIENT)
Dept: PODIATRY | Facility: CLINIC | Age: 83
End: 2023-06-28
Payer: MEDICARE

## 2023-06-28 VITALS — HEIGHT: 65 IN | BODY MASS INDEX: 22.15 KG/M2 | WEIGHT: 132.94 LBS

## 2023-06-28 DIAGNOSIS — Z79.4 TYPE 2 DIABETES MELLITUS WITH CHRONIC KIDNEY DISEASE, WITH LONG-TERM CURRENT USE OF INSULIN, UNSPECIFIED CKD STAGE: Chronic | ICD-10-CM

## 2023-06-28 DIAGNOSIS — E11.22 TYPE 2 DIABETES MELLITUS WITH CHRONIC KIDNEY DISEASE, WITH LONG-TERM CURRENT USE OF INSULIN, UNSPECIFIED CKD STAGE: Chronic | ICD-10-CM

## 2023-06-28 DIAGNOSIS — Z95.828 S/P FEMORAL-FEMORAL BYPASS SURGERY: ICD-10-CM

## 2023-06-28 DIAGNOSIS — I73.9 PAD (PERIPHERAL ARTERY DISEASE): Chronic | ICD-10-CM

## 2023-06-28 DIAGNOSIS — L97.509 ULCER OF FOOT, UNSPECIFIED LATERALITY, UNSPECIFIED ULCER STAGE: Primary | ICD-10-CM

## 2023-06-28 DIAGNOSIS — I70.229 CRITICAL LOWER LIMB ISCHEMIA: ICD-10-CM

## 2023-06-28 DIAGNOSIS — R60.0 EDEMA OF LEFT FOOT: ICD-10-CM

## 2023-06-28 PROCEDURE — 99214 PR OFFICE/OUTPT VISIT, EST, LEVL IV, 30-39 MIN: ICD-10-PCS | Mod: S$GLB,,, | Performed by: PODIATRIST

## 2023-06-28 PROCEDURE — 1111F DSCHRG MED/CURRENT MED MERGE: CPT | Mod: CPTII,S$GLB,, | Performed by: PODIATRIST

## 2023-06-28 PROCEDURE — 3288F PR FALLS RISK ASSESSMENT DOCUMENTED: ICD-10-PCS | Mod: CPTII,S$GLB,, | Performed by: PODIATRIST

## 2023-06-28 PROCEDURE — 99999 PR PBB SHADOW E&M-EST. PATIENT-LVL V: ICD-10-PCS | Mod: PBBFAC,,, | Performed by: PODIATRIST

## 2023-06-28 PROCEDURE — 1101F PR PT FALLS ASSESS DOC 0-1 FALLS W/OUT INJ PAST YR: ICD-10-PCS | Mod: CPTII,S$GLB,, | Performed by: PODIATRIST

## 2023-06-28 PROCEDURE — 1159F MED LIST DOCD IN RCRD: CPT | Mod: CPTII,S$GLB,, | Performed by: PODIATRIST

## 2023-06-28 PROCEDURE — 1159F PR MEDICATION LIST DOCUMENTED IN MEDICAL RECORD: ICD-10-PCS | Mod: CPTII,S$GLB,, | Performed by: PODIATRIST

## 2023-06-28 PROCEDURE — 1101F PT FALLS ASSESS-DOCD LE1/YR: CPT | Mod: CPTII,S$GLB,, | Performed by: PODIATRIST

## 2023-06-28 PROCEDURE — 3288F FALL RISK ASSESSMENT DOCD: CPT | Mod: CPTII,S$GLB,, | Performed by: PODIATRIST

## 2023-06-28 PROCEDURE — 1111F PR DISCHARGE MEDS RECONCILED W/ CURRENT OUTPATIENT MED LIST: ICD-10-PCS | Mod: CPTII,S$GLB,, | Performed by: PODIATRIST

## 2023-06-28 PROCEDURE — 99999 PR PBB SHADOW E&M-EST. PATIENT-LVL V: CPT | Mod: PBBFAC,,, | Performed by: PODIATRIST

## 2023-06-28 PROCEDURE — 99214 OFFICE O/P EST MOD 30 MIN: CPT | Mod: S$GLB,,, | Performed by: PODIATRIST

## 2023-06-28 NOTE — PROGRESS NOTES
Subjective:     Patient ID: Nelsy Marino is a 82 y.o. female.    Chief Complaint: Diabetes Mellitus (Dr Cason 4/21/23) and Foot Swelling    Nelsy is a 82 y.o. female who presents to the clinic for evaluation and treatment of high risk feet. Nelsy has a past medical history of CHF (congestive heart failure), Diabetes mellitus, DVT (deep venous thrombosis), Hypertension, Miscarriage, and On home oxygen therapy. S/p R TMA per  3/23/23 also has Left heel ulceration. Currently has HH seen yesterday. Presents with .       PCP: [facility]    Date Last Seen by PCP: per above    Current shoe gear:  Affected Foot: Football and Darco shoe on the affected foot     Unaffected Foot: Football and Darco shoe on the affected foot        Hemoglobin A1C   Date Value Ref Range Status   06/20/2023 6.5 (H) 4.0 - 5.6 % Final     Comment:     ADA Screening Guidelines:  5.7-6.4%  Consistent with prediabetes  >or=6.5%  Consistent with diabetes    High levels of fetal hemoglobin interfere with the HbA1C  assay. Heterozygous hemoglobin variants (HbS, HgC, etc)do  not significantly interfere with this assay.   However, presence of multiple variants may affect accuracy.     02/28/2023 8.2 (H) 4.0 - 5.6 % Final     Comment:     ADA Screening Guidelines:  5.7-6.4%  Consistent with prediabetes  >or=6.5%  Consistent with diabetes    High levels of fetal hemoglobin interfere with the HbA1C  assay. Heterozygous hemoglobin variants (HbS, HgC, etc)do  not significantly interfere with this assay.   However, presence of multiple variants may affect accuracy.     03/20/2019 9.7 (H) 4.0 - 5.6 % Final     Comment:     ADA Screening Guidelines:  5.7-6.4%  Consistent with prediabetes  >or=6.5%  Consistent with diabetes  High levels of fetal hemoglobin interfere with the HbA1C  assay. Heterozygous hemoglobin variants (HbS, HgC, etc)do  not significantly interfere with this assay.   However, presence of multiple variants may affect  accuracy.         Review of Systems   Constitutional: Negative for chills.   Cardiovascular:  Negative for chest pain and claudication.   Respiratory:  Negative for cough.    Skin:  Positive for color change, dry skin and nail changes.   Musculoskeletal:  Positive for joint pain.   Gastrointestinal:  Negative for nausea.   Neurological:  Positive for paresthesias. Negative for numbness.   Psychiatric/Behavioral:  The patient is not nervous/anxious.       Objective:     Physical Exam  Constitutional:       Appearance: She is well-developed.   Cardiovascular:      Comments: Dorsalis pedis and posterior tibial pulses are diminished Bilaterally. Toes are cool to touch. Feet are warm proximally.There is decreased digital hair. Skin is atrophic, slightly hyperpigmented, and mildly edematous   Pulmonary:      Effort: No respiratory distress.   Musculoskeletal:         General: Tenderness present.      Comments: Adequate joint range of motion without pain, limitation, nor crepitation Bilateral feet and ankle joints. Muscle strength is 5/5 in all groups bilaterally.    Feet:      Right foot:      Skin integrity: Callus and dry skin present. No ulcer or skin breakdown.      Left foot:      Skin integrity: Dry skin present. No ulcer.   Skin:     General: Skin is dry.      Findings: No erythema.      Comments: Left heel decubitus with HPK    S/P R TMA wound dehiscence with fibrotic base. No purulent drainage.   Stable eschar left hallux.    Neurological:      Mental Status: She is alert.      Comments: [place] 5.07 monofilamant testing is diminished [place] feet. Sharp/dull sensation diminished Bilaterally. Light touch absent Bilaterally.      6/28/23:              Assessment:      Encounter Diagnoses   Name Primary?    Edema of left foot     Critical right lower limb ischemia     S/P femoral-femoral bypass surgery     Ulcer of foot, unspecified laterality, unspecified ulcer stage Yes    Type 2 diabetes mellitus with  chronic kidney disease, with long-term current use of insulin, unspecified CKD stage     PAD (peripheral artery disease)      Plan:     Nelsy was seen today for diabetes mellitus and foot swelling.    Diagnoses and all orders for this visit:    Ulcer of foot, unspecified laterality, unspecified ulcer stage  -     Ambulatory referral/consult to Wound Clinic; Future    Edema of left foot  -     Ambulatory referral/consult to Podiatry    Critical right lower limb ischemia  -     Ambulatory referral/consult to Podiatry    S/P femoral-femoral bypass surgery  -     Ambulatory referral/consult to Podiatry    Type 2 diabetes mellitus with chronic kidney disease, with long-term current use of insulin, unspecified CKD stage    PAD (peripheral artery disease)      I counseled the patient on her conditions, their implications and medical management.      Debridement: With verbal consent, nonviable tissues on the right foot were debrided to subq utilizing a  sterile No. 3 scalpel and forceps. Minimal bleeding controlled with direct pressure  The patient tolerated this well.     Dressings: iodsoorb  Offloading:Foam    Follow-up:Patient is to return to the clinic in 1 week wound care referral placed.   for follow-up but [facility] if any signs of infection, such as fever, chills, sweats, increased redness or pain.    Short-term goals include maintaining good offloading and minimizing bioburden, promoting granulation and epithelialization to healing.  Long-term goals include keeping the wound healed by good offloading and medical management under the direction of internist.    - Shoe inspection. Diabetic Foot Education. Patient reminded of the importance of good nutrition and blood sugar control to help prevent podiatric complications of diabetes. Patient instructed on proper foot hygeine. We discussed wearing proper shoe gear, daily foot inspections, never walking without protective shoe gear, caution  putting sharp instruments to feet     - Discussed DM foot care:  Wear comfortable, proper fitting shoes. Wash feet daily. Dry well. After drying, apply moisturizer to feet (no lotion to webspaces). Inspect feet daily for skin breaks, blisters, swelling, or redness. Wear cotton socks (preferably white)  Change socks every day. Do NOT walk barefoot. Do NOT use heating pads or warm/hot water soaks

## 2023-07-01 ENCOUNTER — TELEPHONE (OUTPATIENT)
Dept: ADMINISTRATIVE | Facility: CLINIC | Age: 83
End: 2023-07-01
Payer: MEDICARE

## 2023-07-10 ENCOUNTER — HOSPITAL ENCOUNTER (INPATIENT)
Facility: HOSPITAL | Age: 83
LOS: 8 days | Discharge: HOSPICE | DRG: 871 | End: 2023-07-18
Attending: EMERGENCY MEDICINE | Admitting: HOSPITALIST
Payer: MEDICARE

## 2023-07-10 DIAGNOSIS — E86.0 SEVERE DEHYDRATION: ICD-10-CM

## 2023-07-10 DIAGNOSIS — I50.9 CHF (CONGESTIVE HEART FAILURE): ICD-10-CM

## 2023-07-10 DIAGNOSIS — R65.21 SEPTIC SHOCK: ICD-10-CM

## 2023-07-10 DIAGNOSIS — R11.10 VOMITING, UNSPECIFIED VOMITING TYPE, UNSPECIFIED WHETHER NAUSEA PRESENT: ICD-10-CM

## 2023-07-10 DIAGNOSIS — R93.89 ABNORMAL CHEST X-RAY: ICD-10-CM

## 2023-07-10 DIAGNOSIS — R33.9 URINARY RETENTION: ICD-10-CM

## 2023-07-10 DIAGNOSIS — N30.90 CYSTITIS: ICD-10-CM

## 2023-07-10 DIAGNOSIS — R09.02 HYPOXIA: ICD-10-CM

## 2023-07-10 DIAGNOSIS — A41.9 SEPSIS, DUE TO UNSPECIFIED ORGANISM, UNSPECIFIED WHETHER ACUTE ORGAN DYSFUNCTION PRESENT: ICD-10-CM

## 2023-07-10 DIAGNOSIS — R57.9 SHOCK, UNSPECIFIED: Primary | ICD-10-CM

## 2023-07-10 DIAGNOSIS — A41.9 SEPTIC SHOCK: ICD-10-CM

## 2023-07-10 DIAGNOSIS — E87.20 LACTIC ACIDOSIS: ICD-10-CM

## 2023-07-10 DIAGNOSIS — Z51.5 PALLIATIVE CARE ENCOUNTER: ICD-10-CM

## 2023-07-10 DIAGNOSIS — R41.82 ALTERED MENTAL STATUS: ICD-10-CM

## 2023-07-10 PROBLEM — R65.20 SEPSIS WITH ACUTE ORGAN DYSFUNCTION: Status: ACTIVE | Noted: 2023-02-27

## 2023-07-10 PROBLEM — N30.00 ACUTE CYSTITIS: Status: ACTIVE | Noted: 2023-07-10

## 2023-07-10 LAB
ABO GROUP BLD: NORMAL
ALBUMIN SERPL BCP-MCNC: 2.4 G/DL (ref 3.5–5.2)
ALLENS TEST: ABNORMAL
ALP SERPL-CCNC: 122 U/L (ref 55–135)
ALT SERPL W/O P-5'-P-CCNC: 10 U/L (ref 10–44)
ANION GAP SERPL CALC-SCNC: 26 MMOL/L (ref 8–16)
AST SERPL-CCNC: 33 U/L (ref 10–40)
BACTERIA #/AREA URNS HPF: ABNORMAL /HPF
BASOPHILS # BLD AUTO: 0.01 K/UL (ref 0–0.2)
BASOPHILS NFR BLD: 0.1 % (ref 0–1.9)
BILIRUB SERPL-MCNC: 0.4 MG/DL (ref 0.1–1)
BILIRUB UR QL STRIP: NEGATIVE
BLD GP AB SCN CELLS X3 SERPL QL: ABNORMAL
BLOOD GROUP ANTIBODIES SERPL: NORMAL
BNP SERPL-MCNC: 1856 PG/ML (ref 0–99)
BUN SERPL-MCNC: 71 MG/DL (ref 8–23)
CALCIUM SERPL-MCNC: 9 MG/DL (ref 8.7–10.5)
CHLORIDE SERPL-SCNC: 91 MMOL/L (ref 95–110)
CLARITY UR: ABNORMAL
CO2 SERPL-SCNC: 24 MMOL/L (ref 23–29)
COLOR UR: ABNORMAL
CREAT SERPL-MCNC: 4.9 MG/DL (ref 0.5–1.4)
DELSYS: ABNORMAL
DIFFERENTIAL METHOD: ABNORMAL
EOSINOPHIL # BLD AUTO: 0.1 K/UL (ref 0–0.5)
EOSINOPHIL NFR BLD: 0.9 % (ref 0–8)
ERYTHROCYTE [DISTWIDTH] IN BLOOD BY AUTOMATED COUNT: 17.2 % (ref 11.5–14.5)
EST. GFR  (NO RACE VARIABLE): 8 ML/MIN/1.73 M^2
FLOW: 4
GLUCOSE SERPL-MCNC: 84 MG/DL (ref 70–110)
GLUCOSE UR QL STRIP: NEGATIVE
HCT VFR BLD AUTO: 24.8 % (ref 37–48.5)
HGB BLD-MCNC: 7.7 G/DL (ref 12–16)
HGB UR QL STRIP: NEGATIVE
HYALINE CASTS #/AREA URNS LPF: 29 /LPF
IMM GRANULOCYTES # BLD AUTO: 0.09 K/UL (ref 0–0.04)
IMM GRANULOCYTES NFR BLD AUTO: 0.7 % (ref 0–0.5)
KETONES UR QL STRIP: NEGATIVE
LACTATE SERPL-SCNC: 1.9 MMOL/L (ref 0.5–2.2)
LACTATE SERPL-SCNC: 3.9 MMOL/L (ref 0.5–2.2)
LDH SERPL L TO P-CCNC: 3.25 MMOL/L (ref 0.5–2.2)
LEUKOCYTE ESTERASE UR QL STRIP: ABNORMAL
LYMPHOCYTES # BLD AUTO: 1.5 K/UL (ref 1–4.8)
LYMPHOCYTES NFR BLD: 11.3 % (ref 18–48)
MAGNESIUM SERPL-MCNC: 1.9 MG/DL (ref 1.6–2.6)
MCH RBC QN AUTO: 24.7 PG (ref 27–31)
MCHC RBC AUTO-ENTMCNC: 31 G/DL (ref 32–36)
MCV RBC AUTO: 80 FL (ref 82–98)
MICROSCOPIC COMMENT: ABNORMAL
MODE: ABNORMAL
MONOCYTES # BLD AUTO: 0.8 K/UL (ref 0.3–1)
MONOCYTES NFR BLD: 6.2 % (ref 4–15)
NEUTROPHILS # BLD AUTO: 10.5 K/UL (ref 1.8–7.7)
NEUTROPHILS NFR BLD: 80.8 % (ref 38–73)
NITRITE UR QL STRIP: NEGATIVE
NON-SQ EPI CELLS #/AREA URNS HPF: 2 /HPF
NRBC BLD-RTO: 0 /100 WBC
PH UR STRIP: 5 [PH] (ref 5–8)
PHOSPHATE SERPL-MCNC: 7.1 MG/DL (ref 2.7–4.5)
PLATELET # BLD AUTO: 477 K/UL (ref 150–450)
PMV BLD AUTO: 10 FL (ref 9.2–12.9)
POCT GLUCOSE: 145 MG/DL (ref 70–110)
POCT GLUCOSE: 169 MG/DL (ref 70–110)
POTASSIUM SERPL-SCNC: 3.8 MMOL/L (ref 3.5–5.1)
PROCALCITONIN SERPL IA-MCNC: 1.32 NG/ML
PROT SERPL-MCNC: 7.9 G/DL (ref 6–8.4)
PROT UR QL STRIP: ABNORMAL
RBC # BLD AUTO: 3.12 M/UL (ref 4–5.4)
RBC #/AREA URNS HPF: 51 /HPF (ref 0–4)
RH BLD: NORMAL
SAMPLE: ABNORMAL
SITE: ABNORMAL
SODIUM SERPL-SCNC: 141 MMOL/L (ref 136–145)
SP GR UR STRIP: 1.01 (ref 1–1.03)
SP02: 93
SPECIMEN OUTDATE: ABNORMAL
SQUAMOUS #/AREA URNS HPF: 3 /HPF
TROPONIN I SERPL DL<=0.01 NG/ML-MCNC: 0.05 NG/ML (ref 0–0.03)
UNIDENT CRYS URNS QL MICRO: ABNORMAL
URN SPEC COLLECT METH UR: ABNORMAL
UROBILINOGEN UR STRIP-ACNC: NEGATIVE EU/DL
WBC # BLD AUTO: 12.98 K/UL (ref 3.9–12.7)
WBC #/AREA URNS HPF: >100 /HPF (ref 0–5)
WBC CLUMPS URNS QL MICRO: ABNORMAL
YEAST URNS QL MICRO: ABNORMAL

## 2023-07-10 PROCEDURE — 93005 ELECTROCARDIOGRAM TRACING: CPT

## 2023-07-10 PROCEDURE — 85025 COMPLETE CBC W/AUTO DIFF WBC: CPT | Performed by: EMERGENCY MEDICINE

## 2023-07-10 PROCEDURE — 81000 URINALYSIS NONAUTO W/SCOPE: CPT | Performed by: EMERGENCY MEDICINE

## 2023-07-10 PROCEDURE — 25000003 PHARM REV CODE 250: Performed by: EMERGENCY MEDICINE

## 2023-07-10 PROCEDURE — 20000000 HC ICU ROOM

## 2023-07-10 PROCEDURE — 84100 ASSAY OF PHOSPHORUS: CPT | Performed by: EMERGENCY MEDICINE

## 2023-07-10 PROCEDURE — 84484 ASSAY OF TROPONIN QUANT: CPT | Performed by: EMERGENCY MEDICINE

## 2023-07-10 PROCEDURE — 63600175 PHARM REV CODE 636 W HCPCS: Performed by: EMERGENCY MEDICINE

## 2023-07-10 PROCEDURE — 86850 RBC ANTIBODY SCREEN: CPT | Performed by: EMERGENCY MEDICINE

## 2023-07-10 PROCEDURE — 96365 THER/PROPH/DIAG IV INF INIT: CPT

## 2023-07-10 PROCEDURE — 99900035 HC TECH TIME PER 15 MIN (STAT)

## 2023-07-10 PROCEDURE — 86901 BLOOD TYPING SEROLOGIC RH(D): CPT | Performed by: EMERGENCY MEDICINE

## 2023-07-10 PROCEDURE — 86870 RBC ANTIBODY IDENTIFICATION: CPT | Performed by: EMERGENCY MEDICINE

## 2023-07-10 PROCEDURE — 93010 EKG 12-LEAD: ICD-10-PCS | Mod: ,,, | Performed by: INTERNAL MEDICINE

## 2023-07-10 PROCEDURE — 93010 ELECTROCARDIOGRAM REPORT: CPT | Mod: ,,, | Performed by: INTERNAL MEDICINE

## 2023-07-10 PROCEDURE — 83605 ASSAY OF LACTIC ACID: CPT | Performed by: EMERGENCY MEDICINE

## 2023-07-10 PROCEDURE — 25000003 PHARM REV CODE 250: Performed by: INTERNAL MEDICINE

## 2023-07-10 PROCEDURE — 83735 ASSAY OF MAGNESIUM: CPT | Performed by: EMERGENCY MEDICINE

## 2023-07-10 PROCEDURE — 96361 HYDRATE IV INFUSION ADD-ON: CPT

## 2023-07-10 PROCEDURE — 27000221 HC OXYGEN, UP TO 24 HOURS

## 2023-07-10 PROCEDURE — 99285 EMERGENCY DEPT VISIT HI MDM: CPT | Mod: 25

## 2023-07-10 PROCEDURE — 87040 BLOOD CULTURE FOR BACTERIA: CPT | Performed by: EMERGENCY MEDICINE

## 2023-07-10 PROCEDURE — 87086 URINE CULTURE/COLONY COUNT: CPT | Performed by: EMERGENCY MEDICINE

## 2023-07-10 PROCEDURE — 87106 FUNGI IDENTIFICATION YEAST: CPT | Performed by: EMERGENCY MEDICINE

## 2023-07-10 PROCEDURE — 25000242 PHARM REV CODE 250 ALT 637 W/ HCPCS: Performed by: STUDENT IN AN ORGANIZED HEALTH CARE EDUCATION/TRAINING PROGRAM

## 2023-07-10 PROCEDURE — 63600175 PHARM REV CODE 636 W HCPCS: Performed by: INTERNAL MEDICINE

## 2023-07-10 PROCEDURE — 83605 ASSAY OF LACTIC ACID: CPT

## 2023-07-10 PROCEDURE — 96367 TX/PROPH/DG ADDL SEQ IV INF: CPT

## 2023-07-10 PROCEDURE — 87088 URINE BACTERIA CULTURE: CPT | Performed by: EMERGENCY MEDICINE

## 2023-07-10 PROCEDURE — 86922 COMPATIBILITY TEST ANTIGLOB: CPT | Performed by: INTERNAL MEDICINE

## 2023-07-10 PROCEDURE — 86900 BLOOD TYPING SEROLOGIC ABO: CPT | Performed by: EMERGENCY MEDICINE

## 2023-07-10 PROCEDURE — 83880 ASSAY OF NATRIURETIC PEPTIDE: CPT | Performed by: EMERGENCY MEDICINE

## 2023-07-10 PROCEDURE — 80053 COMPREHEN METABOLIC PANEL: CPT | Performed by: EMERGENCY MEDICINE

## 2023-07-10 PROCEDURE — 84145 PROCALCITONIN (PCT): CPT | Performed by: EMERGENCY MEDICINE

## 2023-07-10 PROCEDURE — 94640 AIRWAY INHALATION TREATMENT: CPT

## 2023-07-10 PROCEDURE — 83605 ASSAY OF LACTIC ACID: CPT | Mod: 91 | Performed by: STUDENT IN AN ORGANIZED HEALTH CARE EDUCATION/TRAINING PROGRAM

## 2023-07-10 RX ORDER — SODIUM CHLORIDE 0.9 % (FLUSH) 0.9 %
10 SYRINGE (ML) INJECTION
Status: DISCONTINUED | OUTPATIENT
Start: 2023-07-10 | End: 2023-07-18 | Stop reason: HOSPADM

## 2023-07-10 RX ORDER — NOREPINEPHRINE BITARTRATE/D5W 4MG/250ML
0-1 PLASTIC BAG, INJECTION (ML) INTRAVENOUS CONTINUOUS
Status: DISCONTINUED | OUTPATIENT
Start: 2023-07-10 | End: 2023-07-11

## 2023-07-10 RX ORDER — INSULIN ASPART 100 [IU]/ML
0-5 INJECTION, SOLUTION INTRAVENOUS; SUBCUTANEOUS
Status: DISCONTINUED | OUTPATIENT
Start: 2023-07-10 | End: 2023-07-18 | Stop reason: HOSPADM

## 2023-07-10 RX ORDER — SODIUM CHLORIDE 0.9 % (FLUSH) 0.9 %
10 SYRINGE (ML) INJECTION
Status: DISCONTINUED | OUTPATIENT
Start: 2023-07-10 | End: 2023-07-12

## 2023-07-10 RX ORDER — IPRATROPIUM BROMIDE AND ALBUTEROL SULFATE 2.5; .5 MG/3ML; MG/3ML
3 SOLUTION RESPIRATORY (INHALATION) EVERY 6 HOURS
Status: DISCONTINUED | OUTPATIENT
Start: 2023-07-10 | End: 2023-07-18 | Stop reason: HOSPADM

## 2023-07-10 RX ORDER — SODIUM CHLORIDE 9 MG/ML
1000 INJECTION, SOLUTION INTRAVENOUS
Status: COMPLETED | OUTPATIENT
Start: 2023-07-10 | End: 2023-07-10

## 2023-07-10 RX ORDER — ACETAMINOPHEN 325 MG/1
650 TABLET ORAL EVERY 4 HOURS PRN
Status: DISCONTINUED | OUTPATIENT
Start: 2023-07-10 | End: 2023-07-18 | Stop reason: HOSPADM

## 2023-07-10 RX ORDER — TALC
6 POWDER (GRAM) TOPICAL NIGHTLY PRN
Status: DISCONTINUED | OUTPATIENT
Start: 2023-07-10 | End: 2023-07-18 | Stop reason: HOSPADM

## 2023-07-10 RX ORDER — NOREPINEPHRINE BITARTRATE/D5W 4MG/250ML
0-3 PLASTIC BAG, INJECTION (ML) INTRAVENOUS CONTINUOUS
Status: DISCONTINUED | OUTPATIENT
Start: 2023-07-10 | End: 2023-07-10

## 2023-07-10 RX ORDER — IBUPROFEN 200 MG
16 TABLET ORAL
Status: DISCONTINUED | OUTPATIENT
Start: 2023-07-10 | End: 2023-07-18 | Stop reason: HOSPADM

## 2023-07-10 RX ORDER — GLUCAGON 1 MG
1 KIT INJECTION
Status: DISCONTINUED | OUTPATIENT
Start: 2023-07-10 | End: 2023-07-18 | Stop reason: HOSPADM

## 2023-07-10 RX ORDER — IBUPROFEN 200 MG
24 TABLET ORAL
Status: DISCONTINUED | OUTPATIENT
Start: 2023-07-10 | End: 2023-07-18 | Stop reason: HOSPADM

## 2023-07-10 RX ORDER — ACETAMINOPHEN 325 MG/1
650 TABLET ORAL EVERY 6 HOURS PRN
Status: DISCONTINUED | OUTPATIENT
Start: 2023-07-10 | End: 2023-07-10

## 2023-07-10 RX ORDER — AMOXICILLIN 250 MG
1 CAPSULE ORAL DAILY PRN
Status: DISCONTINUED | OUTPATIENT
Start: 2023-07-10 | End: 2023-07-18 | Stop reason: HOSPADM

## 2023-07-10 RX ORDER — SODIUM CHLORIDE 9 MG/ML
1000 INJECTION, SOLUTION INTRAVENOUS ONCE
Status: COMPLETED | OUTPATIENT
Start: 2023-07-10 | End: 2023-07-10

## 2023-07-10 RX ORDER — SODIUM CHLORIDE 9 MG/ML
1000 INJECTION, SOLUTION INTRAVENOUS
Status: DISCONTINUED | OUTPATIENT
Start: 2023-07-10 | End: 2023-07-10

## 2023-07-10 RX ADMIN — VANCOMYCIN HYDROCHLORIDE 1000 MG: 1 INJECTION, POWDER, LYOPHILIZED, FOR SOLUTION INTRAVENOUS at 05:07

## 2023-07-10 RX ADMIN — SODIUM CHLORIDE 1500 ML: 9 INJECTION, SOLUTION INTRAVENOUS at 06:07

## 2023-07-10 RX ADMIN — PIPERACILLIN AND TAZOBACTAM 4.5 G: 4; .5 INJECTION, POWDER, LYOPHILIZED, FOR SOLUTION INTRAVENOUS; PARENTERAL at 04:07

## 2023-07-10 RX ADMIN — SODIUM CHLORIDE 1000 ML: 9 INJECTION, SOLUTION INTRAVENOUS at 10:07

## 2023-07-10 RX ADMIN — SODIUM CHLORIDE 1000 ML: 9 INJECTION, SOLUTION INTRAVENOUS at 06:07

## 2023-07-10 RX ADMIN — AZITHROMYCIN MONOHYDRATE 500 MG: 500 INJECTION, POWDER, LYOPHILIZED, FOR SOLUTION INTRAVENOUS at 11:07

## 2023-07-10 RX ADMIN — IPRATROPIUM BROMIDE AND ALBUTEROL SULFATE 3 ML: 2.5; .5 SOLUTION RESPIRATORY (INHALATION) at 08:07

## 2023-07-10 RX ADMIN — NOREPINEPHRINE BITARTRATE 0.2 MCG/KG/MIN: 4 INJECTION, SOLUTION INTRAVENOUS at 09:07

## 2023-07-10 RX ADMIN — SODIUM CHLORIDE 1000 ML: 9 INJECTION, SOLUTION INTRAVENOUS at 03:07

## 2023-07-10 NOTE — PHARMACY MED REC
"Admission Medication History     The home medication history was taken by Lina Sorensen.    You may go to "Admission" then "Reconcile Home Medications" tabs to review and/or act upon these items.     The home medication list has been updated by the Pharmacy department.   Please read ALL comments highlighted in yellow.   Please address this information as you see fit.    Feel free to contact us if you have any questions or require assistance.    Medications listed below were obtained from: Patient/family and Analytic software- [employer]  (Not in a hospital admission)          Lina Sorensen  [phone]                 .        "

## 2023-07-10 NOTE — ED PROVIDER NOTES
"Encounter Date: 7/10/2023       History     Chief Complaint   Patient presents with    Altered Mental Status     Family called out for AMS, on EMS arrival pt sats were 60-70, has home oxygen for prn use.  Progressive lethargy and decreased urine.  Recent toe ampuation.  Cbg 85     Nelsy Marino is a 81 y/o F with a PMHx of CHF, DM, HTN, DVT, presents to the ED via EMS for AMS onset 3 days ago. 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 He states that the patient is currently not in wound care and reports that he has not been able to get in touch with them. He also reports that the patient has not eaten in about 2 weeks and endorses that the patient's sister noticed that her diaper was a little wet this morning. He states that the patient is not ambulatory. No medications taken PTA. No alleviating or exacerbating factors noted. Patient denies cough, shortness of breath, chest pain, fever, chills, nausea, diarrhea, dysuria, headaches, congestion, sore throat, eye pain, ear pain, rash, or other associated symptoms.          The history is provided by the patient and the spouse. The history is limited by the condition of the patient. No  was used.   Review of patient's allergies indicates:   Allergen Reactions    Motrin [ibuprofen] Itching     Past Medical History:   Diagnosis Date    CHF (congestive heart failure)     Diabetes mellitus     DVT (deep venous thrombosis)     Hypertension     Miscarriage     On home oxygen therapy     PRN     Past Surgical History:   Procedure Laterality Date     femoral vascular surgery       ANGIOGRAPHY OF LOWER EXTREMITY Right 2/23/2023    Procedure: Angiogram Extremity Unilateral;  Surgeon: " Danny Dunbar MD;  Location[facility] OR;  Service: Vascular;  Laterality: Right;  RN PHONE PREOP 2/20/2023----CK CONSENT    ANGIOGRAPHY OF LOWER EXTREMITY Right 4/4/2023    Procedure: Angiogram Extremity Unilateral - possible endovascular intervention;  Surgeon: René Simms MD;  [facility];  Service: Vascular;  Laterality: Right;  19.6 mins.  740.81 mGy  73.3399 Gy.cm  58ml dye  local - 1ml    COLONOSCOPY N/A 5/18/2023    Procedure: COLONOSCOPY;  Surgeon: Cuong Aguiar MD;  Location: [facility] ENDO;  Service: Endoscopy;  Laterality: N/A;    CREATION OF FEMORAL-FEMORAL ARTERY BYPASS WITH GRAFT Bilateral 3/2/2023    Procedure: CREATION, BYPASS, ARTERIAL, FEMORAL TO FEMORAL, USING GRAFT, LEFT FEMORAL ENDARTERECTOMY, LEFT ILIAC ARTERY STENT PLACEMENT, RIGHT LOWER EXTREMITY ANGIOGRAM AND RIGHT SFA  ANGIOPLASTY;  Surgeon: Danny Dunbar MD;  [facility];  Service: Vascular;  Laterality: Bilateral;   744.47 MGY  175.36 Gycm  Flouro time 20.5 mins      CREATION OF ILIOFEMORAL ARTERY BYPASS Right 3/20/2019    Procedure: CREATION, BYPASS, ARTERIAL, ILIAC TO FEMORAL, RIGHT LOWER EXTREMITY, RIGHT PROFUNDAPLASTY;  Surgeon: Danny Dunbar MD;  Location: [facility];  Service: Vascular;  Laterality: Right;  11:00AM START PER ANNE  RN PREOP 3/13/2019  ACT'S, CELL SAVER, GRAFTS, BOOK WATER---NEED H/P  CONSENT IN AM------HGBA1C    CREATION OF MUSCLE ROTATIONAL FLAP Left 3/31/2023    Procedure: CREATION, FLAP, MUSCLE ROTATION;  Surgeon: Brandon Smiley MD;  Location: [facility] OR [facility] FLR;  Service: Plastics;  Laterality: Left;    ENDOSCOPY OF PROXIMAL SMALL INTESTINE N/A 5/18/2023    Procedure: ENTEROSCOPY, PROXIMAL;  Surgeon: Cuong Aguair MD;  Location: [facility] ENDO;  Service: Endoscopy;  Laterality: N/A;    ESOPHAGOGASTRODUODENOSCOPY N/A 3/17/2023    Procedure: EGD (ESOPHAGOGASTRODUODENOSCOPY);  Surgeon: Gee Rodriguez MD;  Location: [facility] ENDO ([facility]);  Service:  Endoscopy;  Laterality: N/A;    ESOPHAGOGASTRODUODENOSCOPY N/A 2023    Procedure: EGD (ESOPHAGOGASTRODUODENOSCOPY);  Surgeon: Nazanin Kaiser MD;  Location: [facility] ENDO (2ND FLR);  Service: Endoscopy;  Laterality: N/A;    ESOPHAGOGASTRODUODENOSCOPY N/A 2023    Procedure: EGD (ESOPHAGOGASTRODUODENOSCOPY);  Surgeon: Gee Rodriguez MD;  Location: [facility] ENDO (2ND FLR);  Service: Endoscopy;  Laterality: N/A;    ESOPHAGOGASTRODUODENOSCOPY N/A 2023    Procedure: EGD (ESOPHAGOGASTRODUODENOSCOPY);  Surgeon: Cuong Conteh MD;  Location: [facility] ENDO (2ND FLR);  Service: Endoscopy;  Laterality: N/A;    FOOT AMPUTATION THROUGH METATARSAL Right 3/4/2023    Procedure: AMPUTATION, FOOT, TRANSMETATARSAL;  Surgeon: Benjamin Godfrey DPM;  [address]  Service: Podiatry;  Laterality: Right;    HYSTERECTOMY      ?unsure cervix present    INCISION AND DRAINAGE Right 2019    Procedure: Incision and Drainage - R groin washout, possible muscle flap;  Surgeon: Danny Dunbar MD;  [address]  Service: Vascular;  Laterality: Right;  groin washout    INCISION AND DRAINAGE OF GROIN Right 5/3/2019    Procedure: Incision and Drainage R groin;  Surgeon: Danny Dunbar MD;  Location: [address];  Service: Vascular;  Laterality: Right;    WOUND DEBRIDEMENT Left 3/31/2023    Procedure: DEBRIDEMENT, WOUND;  Surgeon: René Simms MD;  [address]  Service: Vascular;  Laterality: Left;     Family History   Problem Relation Age of Onset    Diabetes Father     Hypertension Father      Social History     Tobacco Use    Smoking status: Former     Types: Cigarettes     Quit date:      Years since quittin.5    Smokeless tobacco: Never   Substance Use Topics    Alcohol use: No    Drug use: No     Review of Systems   Unable to perform ROS: Mental status change   Constitutional:  Positive for appetite change. Negative for chills, diaphoresis and fever.   HENT:   Negative for congestion, ear pain and sore throat.    Eyes:  Negative for pain.   Respiratory:  Negative for cough and shortness of breath.    Cardiovascular:  Negative for chest pain.   Gastrointestinal:  Positive for abdominal pain ((Resolved)) and vomiting. Negative for diarrhea and nausea.   Genitourinary:  Positive for frequency (Decreased frequency.). Negative for dysuria.   Musculoskeletal:  Positive for arthralgias (L knee.). Negative for back pain.        (+) R toes amputation.   Skin:  Negative for rash.   Neurological:  Negative for headaches.   Psychiatric/Behavioral:  Positive for confusion.      Physical Exam     Initial Vitals [07/10/23 1436]   BP Pulse Resp Temp SpO2   (!) 94/58 77 14 98.8 °F (37.1 °C) (!) 92 %      MAP       --         Physical Exam    ED Course   Procedures  Labs Reviewed   CULTURE, BLOOD   CULTURE, BLOOD   CBC W/ AUTO DIFFERENTIAL   COMPREHENSIVE METABOLIC PANEL   LACTIC ACID, PLASMA   URINALYSIS, REFLEX TO URINE CULTURE   MAGNESIUM   PHOSPHORUS   B-TYPE NATRIURETIC PEPTIDE   TROPONIN I   PROCALCITONIN   TYPE & SCREEN          Imaging Results    None          Medications   sodium chloride 0.9% bolus 1,000 mL 1,000 mL (has no administration in time range)     Medical Decision Making:   History:   Old Medical Records: I decided to obtain old medical records.  Old Records Summarized: other records.       <> Summary of Records: External documents reviewed.     Independently Interpreted Test(s):   I have ordered and independently interpreted EKG Reading(s) - see summary below       <> Summary of EKG Reading(s): EKG independently interpreted by Rodo Patel MD reads: See ED course.     Clinical Tests:   Lab Tests: Ordered and Reviewed  Radiological Study: Reviewed and Ordered  Medical Tests: Ordered and Reviewed        Scribe Attestation:   Scribe #1: I performed the above scribed service and the documentation accurately describes the services I performed. I attest to the accuracy  of the note.                   Clinical Impression:   Final diagnoses:  [R09.02] Hypoxia  [R41.82] Altered mental status           ***

## 2023-07-10 NOTE — ED PROVIDER NOTES
"Encounter Date: 7/10/2023    SCRIBE #1 NOTE: I, Brandon Garza, am scribing for, and in the presence of,  Rodo Patel MD. I have scribed the following portions of the note - Other sections scribed: HPI, ROS.     History     Chief Complaint   Patient presents with    Altered Mental Status     Family called out for AMS, on EMS arrival pt sats were 60-70, has home oxygen for prn use.  Progressive lethargy and decreased urine.  Recent toe ampuation.  Cbg 85     The history is provided by the patient and the EMS personnel. The history is limited by the condition of the patient. No  was used.   Nelsy Marino is a 83 y/o F with a PMHx of CHF, DM, HTN, DVT, presents to the ED via EMS for AMS onset 3 days ago. 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 He states that the patient is currently not in wound care and reports that he has not been able to get in touch with them. He also reports that the patient has not eaten in about 2 weeks and endorses that the patient's sister noticed that her diaper was a little wet this morning. He states that the patient is not ambulatory. No medications taken PTA. No alleviating or exacerbating factors noted. Patient denies cough, shortness of breath, chest pain, fever, chills, nausea, diarrhea, dysuria, headaches, congestion, sore throat, eye pain, ear pain, rash, or other associated symptoms.              The history is provided by the patient and the spouse. The history is limited by the condition of the patient. No  was used.        Review of patient's allergies indicates:   Allergen Reactions    Motrin [ibuprofen] Itching           Past Medical History:   Diagnosis " Date    CHF (congestive heart failure)      Diabetes mellitus      DVT (deep venous thrombosis)      Hypertension      Miscarriage      On home oxygen therapy       PRN            Past Surgical History:   Procedure Laterality Date     femoral vascular surgery         ANGIOGRAPHY OF LOWER EXTREMITY Right 2/23/2023     Procedure: Angiogram Extremity Unilateral;  Surgeon: Danny Dunbar MD;  Location: [facility] OR;  Service: Vascular;  Laterality: Right;  RN PHONE PREOP 2/20/2023----CK CONSENT    ANGIOGRAPHY OF LOWER EXTREMITY Right 4/4/2023     Procedure: Angiogram Extremity Unilateral - possible endovascular intervention;  Surgeon: René Simms MD;  [facility];  Service: Vascular;  Laterality: Right;  19.6 mins.  740.81 mGy  73.3399 Gy.cm  58ml dye  local - 1ml    COLONOSCOPY N/A 5/18/2023     Procedure: COLONOSCOPY;  Surgeon: Cuong Aguiar MD;  Location: [facility] ENDO;  Service: Endoscopy;  Laterality: N/A;    CREATION OF FEMORAL-FEMORAL ARTERY BYPASS WITH GRAFT Bilateral 3/2/2023     Procedure: CREATION, BYPASS, ARTERIAL, FEMORAL TO FEMORAL, USING GRAFT, LEFT FEMORAL ENDARTERECTOMY, LEFT ILIAC ARTERY STENT PLACEMENT, RIGHT LOWER EXTREMITY ANGIOGRAM AND RIGHT SFA  ANGIOPLASTY;  Surgeon: Danny Dunbar MD;  [facility];  Service: Vascular;  Laterality: Bilateral;   744.47 MGY  175.36 Gycm  Flouro time 20.5 mins       CREATION OF ILIOFEMORAL ARTERY BYPASS Right 3/20/2019     Procedure: CREATION, BYPASS, ARTERIAL, ILIAC TO FEMORAL, RIGHT LOWER EXTREMITY, RIGHT PROFUNDAPLASTY;  Surgeon: Danny Dunbar MD;  Location: [facility] OR;  Service: Vascular;  Laterality: Right;  11:00AM START PER ANNE RICHARDS PREOP 3/13/2019  ACT'S, CELL SAVER, GRAFTS, BOOK WATER---NEED H/P  CONSENT IN AM------HGBA1C    CREATION OF MUSCLE ROTATIONAL FLAP Left 3/31/2023     Procedure: CREATION, FLAP, MUSCLE ROTATION;  Surgeon: Brandon Smiley MD;  [facility];  Service: Plastics;   Laterality: Left;    ENDOSCOPY OF PROXIMAL SMALL INTESTINE N/A 5/18/2023     Procedure: ENTEROSCOPY, PROXIMAL;  Surgeon: Cuong Aguiar MD;  Location: [facility] ENDO;  Service: Endoscopy;  Laterality: N/A;    ESOPHAGOGASTRODUODENOSCOPY N/A 3/17/2023     Procedure: EGD (ESOPHAGOGASTRODUODENOSCOPY);  Surgeon: Gee Rodriguez MD;  Location: [facility] ENDO (2ND FLR);  Service: Endoscopy;  Laterality: N/A;    ESOPHAGOGASTRODUODENOSCOPY N/A 4/11/2023     Procedure: EGD (ESOPHAGOGASTRODUODENOSCOPY);  Surgeon: Nazanin Kaiser MD;  Location: [facility] ENDO (2ND FLR);  Service: Endoscopy;  Laterality: N/A;    ESOPHAGOGASTRODUODENOSCOPY N/A 4/26/2023     Procedure: EGD (ESOPHAGOGASTRODUODENOSCOPY);  Surgeon: Gee Rodriguez MD;  Location: [facility] ENDO (2ND FLR);  Service: Endoscopy;  Laterality: N/A;    ESOPHAGOGASTRODUODENOSCOPY N/A 5/2/2023     Procedure: EGD (ESOPHAGOGASTRODUODENOSCOPY);  Surgeon: Cuong Conteh MD;  Location: [facility] (2ND FLR);  Service: Endoscopy;  Laterality: N/A;    FOOT AMPUTATION THROUGH METATARSAL Right 3/4/2023     Procedure: AMPUTATION, FOOT, TRANSMETATARSAL;  Surgeon: Benjamin Godfrey DPM;  Location: [address];  Service: Podiatry;  Laterality: Right;    HYSTERECTOMY         ?unsure cervix present    INCISION AND DRAINAGE Right 4/12/2019     Procedure: Incision and Drainage - R groin washout, possible muscle flap;  Surgeon: Danny Dunbar MD;  [facility];  Service: Vascular;  Laterality: Right;  groin washout    INCISION AND DRAINAGE OF GROIN Right 5/3/2019     Procedure: Incision and Drainage R groin;  Surgeon: Danny Dunbar MD;  [facility];  Service: Vascular;  Laterality: Right;    WOUND DEBRIDEMENT Left 3/31/2023     Procedure: DEBRIDEMENT, WOUND;  Surgeon: René Simms MD;  [facility];  Service: Vascular;  Laterality: Left;            Family History   Problem Relation Age of Onset    Diabetes Father      Hypertension Father         Social History            Tobacco Use    Smoking status: Former       Types: Cigarettes       Quit date:        Years since quittin.5    Smokeless tobacco: Never   Substance Use Topics    Alcohol use: No    Drug use: No      Review of Systems   Unable to perform ROS: Mental status change   Constitutional:  Positive for appetite change. Negative for chills, diaphoresis and fever.   HENT:  Negative for congestion, ear pain and sore throat.    Eyes:  Negative for pain.   Respiratory:  Negative for cough and shortness of breath.    Cardiovascular:  Negative for chest pain.   Gastrointestinal:  Positive for abdominal pain ((Resolved)) and vomiting. Negative for diarrhea and nausea.   Genitourinary:  Positive for frequency (Decreased frequency.). Negative for dysuria.   Musculoskeletal:  Positive for arthralgias (L knee.). Negative for back pain.        (+) R toes amputation.   Skin:  Negative for rash.   Neurological:  Negative for headaches.   Psychiatric/Behavioral:  Positive for confusion.      Physical Exam     Initial Vitals [07/10/23 1436]   BP Pulse Resp Temp SpO2   (!) 94/58 77 14 98.8 °F (37.1 °C) (!) 92 %      MAP       --         Physical Exam  The patient was examined specifically for the following:   General:No significant distress, Good color, Warm and dry. Head and neck:Scalp atraumatic, Neck supple. Neurological:Appropriate conversation, Gross motor deficits. Eyes:Conjugate gaze, Clear corneas. ENT: No epistaxis. Cardiac: Regular rate and rhythm, Grossly normal heart tones. Pulmonary: Wheezing, Rales. Gastrointestinal: Abdominal tenderness, Abdominal distention. Musculoskeletal: Extremity deformity, Apparent pain with range of motion of the joints. Skin: Rash.   The findings on examination were normal except for the following:  The patient looks pale.  She is poorly responsive.  She seems little confused.  I can not feel pulses in either foot.  There is a fresh healing transmetatarsal  amputation on the right.  ED Course   Critical Care    Date/Time: 7/10/2023 6:38 PM  Performed by: Rodo Patel MD  Authorized by: Rodo Patel MD   Direct patient critical care time: 22 minutes  Additional history critical care time: 11 minutes  Ordering / reviewing critical care time: 11 minutes  Documentation critical care time: 11 minutes  Consulting other physicians critical care time: 11 minutes  Consult with family critical care time: 5 minutes  Total critical care time (exclusive of procedural time) : 71 minutes  Critical care time was exclusive of separately billable procedures and treating other patients and teaching time.  Critical care was necessary to treat or prevent imminent or life-threatening deterioration of the following conditions: circulatory failure, sepsis, shock, dehydration, renal failure and CNS failure or compromise.  Critical care was time spent personally by me on the following activities: development of treatment plan with patient or surrogate, discussions with primary provider, evaluation of patient's response to treatment, examination of patient, obtaining history from patient or surrogate, ordering and performing treatments and interventions, ordering and review of laboratory studies, pulse oximetry, re-evaluation of patient's condition, ordering and review of radiographic studies and review of old charts.      Labs Reviewed   CBC W/ AUTO DIFFERENTIAL - Abnormal; Notable for the following components:       Result Value    WBC 12.98 (*)     RBC 3.12 (*)     Hemoglobin 7.7 (*)     Hematocrit 24.8 (*)     MCV 80 (*)     MCH 24.7 (*)     MCHC 31.0 (*)     RDW 17.2 (*)     Platelets 477 (*)     Immature Granulocytes 0.7 (*)     Gran # (ANC) 10.5 (*)     Immature Grans (Abs) 0.09 (*)     Gran % 80.8 (*)     Lymph % 11.3 (*)     All other components within normal limits   COMPREHENSIVE METABOLIC PANEL - Abnormal; Notable for the following components:    Chloride 91 (*)     BUN 71  (*)     Creatinine 4.9 (*)     Albumin 2.4 (*)     eGFR 8 (*)     Anion Gap 26 (*)     All other components within normal limits   LACTIC ACID, PLASMA - Abnormal; Notable for the following components:    Lactate (Lactic Acid) 3.9 (*)     All other components within normal limits    Narrative:      Lactic acid  critical result(s) called and verbal readback obtained   from Sharmila Hill by JUJU 07/10/2023 16:17   URINALYSIS, REFLEX TO URINE CULTURE - Abnormal; Notable for the following components:    Color, UA Orange (*)     Appearance, UA Cloudy (*)     Protein, UA 1+ (*)     Leukocytes, UA 3+ (*)     All other components within normal limits    Narrative:     Specimen Source->Urine   PHOSPHORUS - Abnormal; Notable for the following components:    Phosphorus 7.1 (*)     All other components within normal limits   B-TYPE NATRIURETIC PEPTIDE - Abnormal; Notable for the following components:    BNP 1,856 (*)     All other components within normal limits   TROPONIN I - Abnormal; Notable for the following components:    Troponin I 0.049 (*)     All other components within normal limits   PROCALCITONIN - Abnormal; Notable for the following components:    Procalcitonin 1.32 (*)     All other components within normal limits   URINALYSIS MICROSCOPIC - Abnormal; Notable for the following components:    RBC, UA 51 (*)     WBC, UA >100 (*)     WBC Clumps, UA Many (*)     Bacteria Many (*)     Yeast, UA Many (*)     Non-Squam Epith 2 (*)     Hyaline Casts, UA 29 (*)     Unclass Ashley UA Many (*)     All other components within normal limits    Narrative:     Specimen Source->Urine   TYPE & SCREEN - Abnormal; Notable for the following components:    Indirect Josselyn POS (*)     All other components within normal limits   ISTAT LACTATE - Abnormal; Notable for the following components:    POC Lactate 3.25 (*)     All other components within normal limits   CULTURE, BLOOD   CULTURE, BLOOD   CULTURE, URINE   MAGNESIUM   LACTIC ACID,  PLASMA   GROUP & RH   ANTIBODY IDENTIFICATION     EKG Readings: (Independently Interpreted)   This patient is in a normal sinus rhythm with a heart rate of 72.  The patient has a long QT interval and nonspecific ST segment and T-wave changes.  There is no definite evidence of acute myocardial infarction or malignant arrhythmia.     Imaging Results              X-Ray Chest AP Portable (Final result)  Result time 07/10/23 16:57:04      Final result by Peggy Jones MD (07/10/23 16:57:04)                   Impression:      As above      Electronically signed by: Peggy Jones MD  Date:    07/10/2023  Time:    16:57               Narrative:    EXAMINATION:  XR CHEST AP PORTABLE    CLINICAL HISTORY:  Sepsis;    TECHNIQUE:  Single frontal view of the chest was performed.    COMPARISON:  June 20, 2023 radiograph and CT June 21, 2023    FINDINGS:  Patient is rotated.  Cardiac size is prominent, similar to prior imaging.  Slight blunting of the lateral costophrenic angles bilaterally noted which may reflect a small amount of pleural fluid.  Lungs demonstrate underinflation.  There are increased interstitial markings with some superimposed bibasilar opacities which may reflect mild edema, overall findings are fairly similar to the previous imaging.  The osseous structures demonstrate degenerative changes.                                       CT Head Without Contrast (Final result)  Result time 07/10/23 16:32:41      Final result by Rambo Moore DO (07/10/23 16:32:41)                   Impression:      No acute intracranial abnormality.      Electronically signed by: Rambo Moore  Date:    07/10/2023  Time:    16:32               Narrative:    EXAMINATION:  CT HEAD WITHOUT CONTRAST    CLINICAL HISTORY:  Mental status change, unknown cause; Altered mental status, unspecified    TECHNIQUE:  Low dose axial CT images obtained throughout the head without intravenous contrast. Sagittal and coronal reconstructions were  performed.    COMPARISON:  CT head from 04/20/2023.    FINDINGS:  Mild motion and streak artifact noted.  Ventricles and sulci are normal in size for age without evidence of hydrocephalus. No extra-axial blood or fluid collections.  There is hypoattenuation in the supratentorial white matter compatible chronic microvascular ischemic changes, stable.  No parenchymal mass, hemorrhage, edema or major vascular distribution infarct.    No calvarial fracture.  The scalp is unremarkable.  Bilateral paranasal sinuses and mastoid air cells are clear.                                        CT Abdomen Pelvis  Without Contrast (Final result)  Result time 07/10/23 16:37:15      Final result by Kenny Gipson MD (07/10/23 16:37:15)                   Impression:      This report was flagged in Epic as abnormal.    1. Motion artifact limits evaluation of the abdomen pelvis.  2. Bilateral small pleural effusions, new since the previous exam, correlation with volume status advised.  3. The gallbladder is distended noting cholelithiasis.  Correlation with any right upper quadrant tenderness, further evaluation with ultrasound as warranted.  4. There are a few mildly fluid distended small bowel loops in the mid abdomen, nonspecific, early enteritis can present in this fashion.  Correlation recommended.  5. The urinary bladder is distended, correlation with any history of urinary retention or outlet obstruction.  6. Findings suggesting gastroesophageal reflux.  7. Please see above for several additional findings.      Electronically signed by: Kenny Gipson MD  Date:    07/10/2023  Time:    16:37               Narrative:    EXAMINATION:  CT ABDOMEN PELVIS WITHOUT CONTRAST    CLINICAL HISTORY:  Nausea/vomiting;    TECHNIQUE:  Low dose axial images, sagittal and coronal reformations were obtained from the lung bases to the pubic symphysis.  Oral contrast was not administered.    COMPARISON:  CTA 03/29/2023    FINDINGS:  Images of  the lower thorax are remarkable for small bilateral pleural effusions.  There is associated compressive atelectasis of the bilateral lower lobes/scarring.  The pleural effusions are new since examination 03/29/2023.  There is a small pericardial effusion.    Allowing for motion artifact, the liver, spleen and adrenal glands are grossly unremarkable.  There is atrophic change of the pancreas without pancreatic ductal dilation.  The gallbladder is distended noting cholelithiasis.  There is prominence of the common duct measuring 8 mm, no convincing intraluminal filling defect to suggest choledocholithiasis.  The stomach is mildly distended with ingested liquid, no wall thickening.  There is a small amount of fluid in the distal esophagus suggesting sequela of gastroesophageal reflux.  There are a few scattered shotty abdominal lymph nodes.    There is no hydronephrosis or nephrolithiasis.  There is a low attenuating lesion within the interpolar region of the left kidney measuring 1.6 cm, attenuation of which suggests cyst.  The bilateral ureters are unable to be followed in their entirety to the urinary bladder, no definite calculi seen or secondary findings to suggest obstructive uropathy.  The urinary bladder is distended without wall thickening.  The uterus and adnexa are unremarkable.  No significant free fluid in the pelvis.    There is moderate stool in the right colon.  The terminal ileum and appendix are unremarkable.  There is slow flow through several distal small bowel loops noting mild fluid distension.  No small bowel wall thickening or significant inflammation.  There is extensive atherosclerotic calcification of the aorta and its branches.  There are several scattered shotty periaortic, pericaval, and mesenteric lymph nodes.    There are degenerative changes of the right femoroacetabular joint/AVN.  There is osteopenia.  There are degenerative changes of the spine.  No significant inguinal  lymphadenopathy.  There is surgical change of fem-fem bypass.                                       Medications   vancomycin (VANCOCIN) 1,000 mg in dextrose 5 % (D5W) 250 mL IVPB (Vial-Mate) (1,000 mg Intravenous New Bag 7/10/23 1757)   sodium chloride 0.9% bolus 1,500 mL 1,500 mL (1,500 mLs Intravenous New Bag 7/10/23 1838)   sodium chloride 0.9% bolus 1,000 mL 1,000 mL (0 mLs Intravenous Stopped 7/10/23 1809)   piperacillin-tazobactam (ZOSYN) 4.5 g in dextrose 5 % in water (D5W) 5 % 100 mL IVPB (MB+) (0 g Intravenous Stopped 7/10/23 1723)   0.9%  NaCl infusion (0 mLs Intravenous Paused 7/10/23 1839)     Medical Decision Making:   History:   Old Medical Records: I decided to obtain old medical records.  Old Records Summarized: other records.       <> Summary of Records: External documents reviewed.     Independently Interpreted Test(s):   I have ordered and independently interpreted EKG Reading(s) - see summary below       <> Summary of EKG Reading(s): EKG independently interpreted by Rodo Patel MD reads: See ED course.     Clinical Tests:   Lab Tests: Reviewed and Ordered  Radiological Study: Ordered and Reviewed  Medical Tests: Reviewed and Ordered     Medical decision making:  Given the above this patient presents emergency room with altered mental status not eating for 2 weeks, no urine for 2 weeks a little bit of dribbling.  The patient is not febrile.  She has an elevated lactate.  She has a marked elevation of the BUN and creatinine suggesting severe dehydration or acute renal failure.  Her bladder is distended.  We considered retention.  A [place] catheter was placed.  The urine was discovered to be infected.  The patient was treated with IV antibiotics.  She was treated with full sepsis fluids.  This was done in conjunction with hospital medicine, Dr. Alvarez, who reviewed the patient's chest x-ray and elevated BNP with me there is concern for congestive heart failure.  I am wondering whether the  patient's chest x-ray is really from interstitial lung disease and not heart failure.  The patient's last echo revealed an ejection fraction of 50%.  All this was considered before the decision was made to treat her with full sepsis fluids and IV crystalloid.  The BNP is elevated.  We believe the patient has urosepsis.  The patient's urine is definitely infected.  Given the chest x-ray pneumonia congestive heart failure as well as interstitial lung disease or considered.  The patient's EKG does not reveal evidence of acute myocardial infarction.  There is a prolonged QT interval.  The patient will be admitted to the ICU.  Cholecystitis was carefully considered given gallbladder stones.  The right upper quadrant is nontender.    IV fluids:  Because of the patient's chest x-ray that was delay in full IV fluid bolus because of concerns over potentiating congestive heart failure with this patient with an elevated BNP.    Focused sepsis reperfusion examination:  A reperfusion examination was performed at 6:36 p.m..     Scribe Attestation:   Scribe #1: I performed the above scribed service and the documentation accurately describes the services I performed. I attest to the accuracy of the note.                   Clinical Impression:   Final diagnoses:  [R09.02] Hypoxia  [R41.82] Altered mental status  [E86.0] Severe dehydration (Primary)  [R11.10] Vomiting, unspecified vomiting type, unspecified whether nausea present  [E87.20] Lactic acidosis  [R93.89] Abnormal chest x-ray  [R33.9] Urinary retention  [N30.90] Cystitis  [A41.9] Sepsis, due to unspecified organism, unspecified whether acute organ dysfunction present       I personally performed the services described in this documentation.  All medical record  entries made by the scribe are at my direction and in my presence.  Signed, Dr. Patel         ED Disposition Condition    Admit Stable                Rodo Patel MD  07/10/23 4730

## 2023-07-11 PROBLEM — G93.41 ENCEPHALOPATHY, METABOLIC: Status: ACTIVE | Noted: 2023-07-11

## 2023-07-11 PROBLEM — Z66 DNR (DO NOT RESUSCITATE): Status: ACTIVE | Noted: 2023-07-11

## 2023-07-11 PROBLEM — R65.21 SEPTIC SHOCK: Status: ACTIVE | Noted: 2023-02-27

## 2023-07-11 PROBLEM — D62 ACUTE BLOOD LOSS ANEMIA: Status: ACTIVE | Noted: 2023-07-11

## 2023-07-11 LAB
ANION GAP SERPL CALC-SCNC: 18 MMOL/L (ref 8–16)
BASOPHILS # BLD AUTO: 0.02 K/UL (ref 0–0.2)
BASOPHILS NFR BLD: 0.2 % (ref 0–1.9)
BLD PROD TYP BPU: NORMAL
BLOOD UNIT EXPIRATION DATE: NORMAL
BLOOD UNIT TYPE CODE: 5100
BLOOD UNIT TYPE: NORMAL
BUN SERPL-MCNC: 72 MG/DL (ref 8–23)
CALCIUM SERPL-MCNC: 7.7 MG/DL (ref 8.7–10.5)
CHLORIDE SERPL-SCNC: 94 MMOL/L (ref 95–110)
CO2 SERPL-SCNC: 22 MMOL/L (ref 23–29)
CODING SYSTEM: NORMAL
CREAT SERPL-MCNC: 4.7 MG/DL (ref 0.5–1.4)
CROSSMATCH INTERPRETATION: NORMAL
DIFFERENTIAL METHOD: ABNORMAL
DISPENSE STATUS: NORMAL
EOSINOPHIL # BLD AUTO: 0.3 K/UL (ref 0–0.5)
EOSINOPHIL NFR BLD: 2 % (ref 0–8)
ERYTHROCYTE [DISTWIDTH] IN BLOOD BY AUTOMATED COUNT: 17.1 % (ref 11.5–14.5)
EST. GFR  (NO RACE VARIABLE): 9 ML/MIN/1.73 M^2
GLUCOSE SERPL-MCNC: 148 MG/DL (ref 70–110)
HCT VFR BLD AUTO: 21.4 % (ref 37–48.5)
HCT VFR BLD AUTO: 22 % (ref 37–48.5)
HGB BLD-MCNC: 6.5 G/DL (ref 12–16)
HGB BLD-MCNC: 6.6 G/DL (ref 12–16)
IMM GRANULOCYTES # BLD AUTO: 0.06 K/UL (ref 0–0.04)
IMM GRANULOCYTES NFR BLD AUTO: 0.5 % (ref 0–0.5)
LYMPHOCYTES # BLD AUTO: 1.5 K/UL (ref 1–4.8)
LYMPHOCYTES NFR BLD: 12 % (ref 18–48)
MCH RBC QN AUTO: 24.6 PG (ref 27–31)
MCHC RBC AUTO-ENTMCNC: 29.5 G/DL (ref 32–36)
MCV RBC AUTO: 83 FL (ref 82–98)
MONOCYTES # BLD AUTO: 0.9 K/UL (ref 0.3–1)
MONOCYTES NFR BLD: 7.4 % (ref 4–15)
NEUTROPHILS # BLD AUTO: 9.6 K/UL (ref 1.8–7.7)
NEUTROPHILS NFR BLD: 77.9 % (ref 38–73)
NRBC BLD-RTO: 0 /100 WBC
NUM UNITS TRANS PACKED RBC: NORMAL
PLATELET # BLD AUTO: 445 K/UL (ref 150–450)
PMV BLD AUTO: 10.4 FL (ref 9.2–12.9)
POCT GLUCOSE: 185 MG/DL (ref 70–110)
POCT GLUCOSE: 186 MG/DL (ref 70–110)
POCT GLUCOSE: 205 MG/DL (ref 70–110)
POCT GLUCOSE: 205 MG/DL (ref 70–110)
POTASSIUM SERPL-SCNC: 3.9 MMOL/L (ref 3.5–5.1)
RBC # BLD AUTO: 2.64 M/UL (ref 4–5.4)
SODIUM SERPL-SCNC: 134 MMOL/L (ref 136–145)
TROPONIN I SERPL DL<=0.01 NG/ML-MCNC: 0.05 NG/ML (ref 0–0.03)
VANCOMYCIN SERPL-MCNC: 13.7 UG/ML
WBC # BLD AUTO: 12.35 K/UL (ref 3.9–12.7)

## 2023-07-11 PROCEDURE — 80048 BASIC METABOLIC PNL TOTAL CA: CPT | Performed by: INTERNAL MEDICINE

## 2023-07-11 PROCEDURE — 63600175 PHARM REV CODE 636 W HCPCS: Performed by: INTERNAL MEDICINE

## 2023-07-11 PROCEDURE — 36415 COLL VENOUS BLD VENIPUNCTURE: CPT | Performed by: HOSPITALIST

## 2023-07-11 PROCEDURE — 84484 ASSAY OF TROPONIN QUANT: CPT | Performed by: INTERNAL MEDICINE

## 2023-07-11 PROCEDURE — 25000242 PHARM REV CODE 250 ALT 637 W/ HCPCS: Performed by: STUDENT IN AN ORGANIZED HEALTH CARE EDUCATION/TRAINING PROGRAM

## 2023-07-11 PROCEDURE — 36430 TRANSFUSION BLD/BLD COMPNT: CPT

## 2023-07-11 PROCEDURE — 36415 COLL VENOUS BLD VENIPUNCTURE: CPT | Performed by: INTERNAL MEDICINE

## 2023-07-11 PROCEDURE — 94640 AIRWAY INHALATION TREATMENT: CPT

## 2023-07-11 PROCEDURE — 20000000 HC ICU ROOM

## 2023-07-11 PROCEDURE — 25000003 PHARM REV CODE 250: Performed by: HOSPITALIST

## 2023-07-11 PROCEDURE — C1751 CATH, INF, PER/CENT/MIDLINE: HCPCS

## 2023-07-11 PROCEDURE — 25000003 PHARM REV CODE 250: Performed by: INTERNAL MEDICINE

## 2023-07-11 PROCEDURE — 63600175 PHARM REV CODE 636 W HCPCS: Performed by: STUDENT IN AN ORGANIZED HEALTH CARE EDUCATION/TRAINING PROGRAM

## 2023-07-11 PROCEDURE — 94761 N-INVAS EAR/PLS OXIMETRY MLT: CPT

## 2023-07-11 PROCEDURE — 36569 INSJ PICC 5 YR+ W/O IMAGING: CPT

## 2023-07-11 PROCEDURE — 27000221 HC OXYGEN, UP TO 24 HOURS

## 2023-07-11 PROCEDURE — 99291 PR CRITICAL CARE, E/M 30-74 MINUTES: ICD-10-PCS | Mod: ,,, | Performed by: INTERNAL MEDICINE

## 2023-07-11 PROCEDURE — 99223 PR INITIAL HOSPITAL CARE,LEVL III: ICD-10-PCS | Mod: ,,, | Performed by: NURSE PRACTITIONER

## 2023-07-11 PROCEDURE — 80202 ASSAY OF VANCOMYCIN: CPT | Performed by: HOSPITALIST

## 2023-07-11 PROCEDURE — P9016 RBC LEUKOCYTES REDUCED: HCPCS | Performed by: INTERNAL MEDICINE

## 2023-07-11 PROCEDURE — 25000003 PHARM REV CODE 250: Performed by: STUDENT IN AN ORGANIZED HEALTH CARE EDUCATION/TRAINING PROGRAM

## 2023-07-11 PROCEDURE — 99223 1ST HOSP IP/OBS HIGH 75: CPT | Mod: ,,, | Performed by: NURSE PRACTITIONER

## 2023-07-11 PROCEDURE — 85014 HEMATOCRIT: CPT | Performed by: INTERNAL MEDICINE

## 2023-07-11 PROCEDURE — 99291 CRITICAL CARE FIRST HOUR: CPT | Mod: ,,, | Performed by: INTERNAL MEDICINE

## 2023-07-11 PROCEDURE — C9113 INJ PANTOPRAZOLE SODIUM, VIA: HCPCS | Performed by: INTERNAL MEDICINE

## 2023-07-11 PROCEDURE — 85025 COMPLETE CBC W/AUTO DIFF WBC: CPT | Performed by: INTERNAL MEDICINE

## 2023-07-11 PROCEDURE — 85018 HEMOGLOBIN: CPT | Performed by: INTERNAL MEDICINE

## 2023-07-11 RX ORDER — MUPIROCIN 20 MG/G
OINTMENT TOPICAL 2 TIMES DAILY
Status: COMPLETED | OUTPATIENT
Start: 2023-07-11 | End: 2023-07-16

## 2023-07-11 RX ORDER — PANTOPRAZOLE SODIUM 40 MG/10ML
40 INJECTION, POWDER, LYOPHILIZED, FOR SOLUTION INTRAVENOUS 2 TIMES DAILY
Status: DISCONTINUED | OUTPATIENT
Start: 2023-07-11 | End: 2023-07-18 | Stop reason: HOSPADM

## 2023-07-11 RX ORDER — FUROSEMIDE 10 MG/ML
40 INJECTION INTRAMUSCULAR; INTRAVENOUS
Status: DISCONTINUED | OUTPATIENT
Start: 2023-07-11 | End: 2023-07-11

## 2023-07-11 RX ORDER — SODIUM CHLORIDE 0.9 % (FLUSH) 0.9 %
10 SYRINGE (ML) INJECTION
Status: DISCONTINUED | OUTPATIENT
Start: 2023-07-11 | End: 2023-07-12

## 2023-07-11 RX ORDER — HYDROCODONE BITARTRATE AND ACETAMINOPHEN 500; 5 MG/1; MG/1
TABLET ORAL
Status: DISCONTINUED | OUTPATIENT
Start: 2023-07-11 | End: 2023-07-18 | Stop reason: HOSPADM

## 2023-07-11 RX ORDER — SODIUM CHLORIDE 0.9 % (FLUSH) 0.9 %
10 SYRINGE (ML) INJECTION EVERY 6 HOURS
Status: DISCONTINUED | OUTPATIENT
Start: 2023-07-12 | End: 2023-07-18 | Stop reason: HOSPADM

## 2023-07-11 RX ORDER — NOREPINEPHRINE BITARTRATE/D5W 4MG/250ML
0-.2 PLASTIC BAG, INJECTION (ML) INTRAVENOUS CONTINUOUS
Status: DISCONTINUED | OUTPATIENT
Start: 2023-07-11 | End: 2023-07-12

## 2023-07-11 RX ORDER — NOREPINEPHRINE BITARTRATE/D5W 4MG/250ML
0-3 PLASTIC BAG, INJECTION (ML) INTRAVENOUS CONTINUOUS
Status: DISCONTINUED | OUTPATIENT
Start: 2023-07-11 | End: 2023-07-16

## 2023-07-11 RX ORDER — FUROSEMIDE 10 MG/ML
100 INJECTION INTRAMUSCULAR; INTRAVENOUS ONCE
Status: COMPLETED | OUTPATIENT
Start: 2023-07-11 | End: 2023-07-11

## 2023-07-11 RX ORDER — OXYCODONE AND ACETAMINOPHEN 5; 325 MG/1; MG/1
1 TABLET ORAL EVERY 8 HOURS PRN
Status: DISCONTINUED | OUTPATIENT
Start: 2023-07-11 | End: 2023-07-18 | Stop reason: HOSPADM

## 2023-07-11 RX ADMIN — NOREPINEPHRINE BITARTRATE 0.2 MCG/KG/MIN: 4 INJECTION, SOLUTION INTRAVENOUS at 05:07

## 2023-07-11 RX ADMIN — NOREPINEPHRINE BITARTRATE 0.2 MCG/KG/MIN: 4 INJECTION, SOLUTION INTRAVENOUS at 08:07

## 2023-07-11 RX ADMIN — OXYCODONE AND ACETAMINOPHEN 1 TABLET: 5; 325 TABLET ORAL at 10:07

## 2023-07-11 RX ADMIN — OXYCODONE AND ACETAMINOPHEN 1 TABLET: 5; 325 TABLET ORAL at 08:07

## 2023-07-11 RX ADMIN — INSULIN ASPART 2 UNITS: 100 INJECTION, SOLUTION INTRAVENOUS; SUBCUTANEOUS at 07:07

## 2023-07-11 RX ADMIN — IPRATROPIUM BROMIDE AND ALBUTEROL SULFATE 3 ML: 2.5; .5 SOLUTION RESPIRATORY (INHALATION) at 08:07

## 2023-07-11 RX ADMIN — IPRATROPIUM BROMIDE AND ALBUTEROL SULFATE 3 ML: 2.5; .5 SOLUTION RESPIRATORY (INHALATION) at 12:07

## 2023-07-11 RX ADMIN — NOREPINEPHRINE BITARTRATE 0.16 MCG/KG/MIN: 4 INJECTION, SOLUTION INTRAVENOUS at 11:07

## 2023-07-11 RX ADMIN — IPRATROPIUM BROMIDE AND ALBUTEROL SULFATE 3 ML: 2.5; .5 SOLUTION RESPIRATORY (INHALATION) at 09:07

## 2023-07-11 RX ADMIN — FUROSEMIDE 100 MG: 10 INJECTION, SOLUTION INTRAMUSCULAR; INTRAVENOUS at 02:07

## 2023-07-11 RX ADMIN — AZITHROMYCIN MONOHYDRATE 500 MG: 500 INJECTION, POWDER, LYOPHILIZED, FOR SOLUTION INTRAVENOUS at 11:07

## 2023-07-11 RX ADMIN — INSULIN ASPART 1 UNITS: 100 INJECTION, SOLUTION INTRAVENOUS; SUBCUTANEOUS at 09:07

## 2023-07-11 RX ADMIN — Medication 10 ML: at 11:07

## 2023-07-11 RX ADMIN — PANTOPRAZOLE SODIUM 40 MG: 40 INJECTION, POWDER, FOR SOLUTION INTRAVENOUS at 08:07

## 2023-07-11 RX ADMIN — NOREPINEPHRINE BITARTRATE 0.3 MCG/KG/MIN: 4 INJECTION, SOLUTION INTRAVENOUS at 01:07

## 2023-07-11 RX ADMIN — VANCOMYCIN HYDROCHLORIDE 750 MG: 750 INJECTION, POWDER, LYOPHILIZED, FOR SOLUTION INTRAVENOUS at 09:07

## 2023-07-11 RX ADMIN — PIPERACILLIN AND TAZOBACTAM 4.5 G: 4; .5 INJECTION, POWDER, LYOPHILIZED, FOR SOLUTION INTRAVENOUS; PARENTERAL at 05:07

## 2023-07-11 RX ADMIN — NOREPINEPHRINE BITARTRATE 0.14 MCG/KG/MIN: 4 INJECTION, SOLUTION INTRAVENOUS at 12:07

## 2023-07-11 RX ADMIN — IPRATROPIUM BROMIDE AND ALBUTEROL SULFATE 3 ML: 2.5; .5 SOLUTION RESPIRATORY (INHALATION) at 01:07

## 2023-07-11 RX ADMIN — FUROSEMIDE 40 MG: 10 INJECTION, SOLUTION INTRAMUSCULAR; INTRAVENOUS at 09:07

## 2023-07-11 RX ADMIN — MUPIROCIN: 20 OINTMENT TOPICAL at 08:07

## 2023-07-11 NOTE — SUBJECTIVE & OBJECTIVE
Past Medical History:   Diagnosis Date    CHF (congestive heart failure)     Diabetes mellitus     DVT (deep venous thrombosis)     Hypertension     Miscarriage     On home oxygen therapy     PRN       Past Surgical History:   Procedure Laterality Date     femoral vascular surgery       ANGIOGRAPHY OF LOWER EXTREMITY Right 2/23/2023    Procedure: Angiogram Extremity Unilateral;  Surgeon: Danny Dunbar MD;  Location: [facility] OR;  Service: Vascular;  Laterality: Right;  RN PHONE PREOP 2/20/2023----CK CONSENT    ANGIOGRAPHY OF LOWER EXTREMITY Right 4/4/2023    Procedure: Angiogram Extremity Unilateral - possible endovascular intervention;  Surgeon: René Simms MD;  [facility];  Service: Vascular;  Laterality: Right;  19.6 mins.  740.81 mGy  73.3399 Gy.cm  58ml dye  local - 1ml    COLONOSCOPY N/A 5/18/2023    Procedure: COLONOSCOPY;  Surgeon: Cuong Aguiar MD;  Location: [facility] ENDO;  Service: Endoscopy;  Laterality: N/A;    CREATION OF FEMORAL-FEMORAL ARTERY BYPASS WITH GRAFT Bilateral 3/2/2023    Procedure: CREATION, BYPASS, ARTERIAL, FEMORAL TO FEMORAL, USING GRAFT, LEFT FEMORAL ENDARTERECTOMY, LEFT ILIAC ARTERY STENT PLACEMENT, RIGHT LOWER EXTREMITY ANGIOGRAM AND RIGHT SFA  ANGIOPLASTY;  Surgeon: Danny Dunbar MD;  [facility];  Service: Vascular;  Laterality: Bilateral;   744.47 MGY  175.36 Gycm  Flouro time 20.5 mins      CREATION OF ILIOFEMORAL ARTERY BYPASS Right 3/20/2019    Procedure: CREATION, BYPASS, ARTERIAL, ILIAC TO FEMORAL, RIGHT LOWER EXTREMITY, RIGHT PROFUNDAPLASTY;  Surgeon: Danny Dunbar MD;  Location: [facility] OR;  Service: Vascular;  Laterality: Right;  11:00AM START PER ANNE RICHARDS PREOP 3/13/2019  ACT'S, CELL SAVER, GRAFTS, BOOK WATER---NEED H/P  CONSENT IN AM------HGBA1C    CREATION OF MUSCLE ROTATIONAL FLAP Left 3/31/2023    Procedure: CREATION, FLAP, MUSCLE ROTATION;  Surgeon: Brandon Smiley MD;  [facility];   Service: Plastics;  Laterality: Left;    ENDOSCOPY OF PROXIMAL SMALL INTESTINE N/A 5/18/2023    Procedure: ENTEROSCOPY, PROXIMAL;  Surgeon: Cuong Aguiar MD;  Location: [facility] ENDO;  Service: Endoscopy;  Laterality: N/A;    ESOPHAGOGASTRODUODENOSCOPY N/A 3/17/2023    Procedure: EGD (ESOPHAGOGASTRODUODENOSCOPY);  Surgeon: Gee Rodriguez MD;  Location: [facility] ENDO (2ND FLR);  Service: Endoscopy;  Laterality: N/A;    ESOPHAGOGASTRODUODENOSCOPY N/A 4/11/2023    Procedure: EGD (ESOPHAGOGASTRODUODENOSCOPY);  Surgeon: Nazanin Kaiser MD;  Location: [facility] ENDO (2ND FLR);  Service: Endoscopy;  Laterality: N/A;    ESOPHAGOGASTRODUODENOSCOPY N/A 4/26/2023    Procedure: EGD (ESOPHAGOGASTRODUODENOSCOPY);  Surgeon: Gee Rodriguez MD;  Location: [facility] ENDO (2ND FLR);  Service: Endoscopy;  Laterality: N/A;    ESOPHAGOGASTRODUODENOSCOPY N/A 5/2/2023    Procedure: EGD (ESOPHAGOGASTRODUODENOSCOPY);  Surgeon: Cuong Conteh MD;  Location: [facility] ENDO (2ND FLR);  Service: Endoscopy;  Laterality: N/A;    FOOT AMPUTATION THROUGH METATARSAL Right 3/4/2023    Procedure: AMPUTATION, FOOT, TRANSMETATARSAL;  Surgeon: Benjamin Godfrey DPM;  Location: [facility] OR [address] [place] [address];  Service: Podiatry;  Laterality: Right;    HYSTERECTOMY      ?unsure cervix present    INCISION AND DRAINAGE Right 4/12/2019    Procedure: Incision and Drainage - R groin washout, possible muscle flap;  Surgeon: Danny Dunbar MD;  [facility];  Service: Vascular;  Laterality: Right;  groin washout    INCISION AND DRAINAGE OF GROIN Right 5/3/2019    Procedure: Incision and Drainage R groin;  Surgeon: Danny Dunbar MD;  [facility];  Service: Vascular;  Laterality: Right;    WOUND DEBRIDEMENT Left 3/31/2023    Procedure: DEBRIDEMENT, WOUND;  Surgeon: René Simms MD;  Location: [facility] OR 2ND FLR;  Service: Vascular;  Laterality: Left;       Review of patient's allergies indicates:   Allergen Reactions    Motrin [ibuprofen]  Itching       Medications:  Continuous Infusions:   NORepinephrine bitartrate-D5W 0.14 mcg/kg/min (23 1210)     Scheduled Meds:   albuterol-ipratropium  3 mL Nebulization Q6H    azithromycin  500 mg Intravenous Q24H    furosemide (LASIX) injection  100 mg Intravenous Once    pantoprazole  40 mg Intravenous BID    piperacillin-tazobactam (Zosyn) IV (PEDS and ADULTS) (extended infusion is not appropriate)  4.5 g Intravenous Q12H     PRN Meds:sodium chloride, acetaminophen, dextrose 50%, dextrose 50%, glucagon (human recombinant), glucose, glucose, insulin aspart U-100, melatonin, oxyCODONE-acetaminophen, promethazine (PHENERGAN) IVPB, senna-docusate 8.6-50 mg, sodium chloride 0.9%, sodium chloride 0.9%, Pharmacy to dose Vancomycin consult **AND** vancomycin - pharmacy to dose    Family History       Problem Relation (Age of Onset)    Diabetes Father    Hypertension Father          Tobacco Use    Smoking status: Former     Types: Cigarettes     Quit date:      Years since quittin.5    Smokeless tobacco: Never   Substance and Sexual Activity    Alcohol use: No    Drug use: No    Sexual activity: Never       Review of Systems   Unable to perform ROS: Mental status change   Objective:     Vital Signs (Most Recent):  Temp: 99 °F (37.2 °C) (23 1115)  Pulse: 64 (23 1343)  Resp: (!) 31 (23 1343)  BP: (!) 94/55 (23 1115)  SpO2: (!) 93 % (23 1343) Vital Signs (24h Range):  Temp:  [97.9 °F (36.6 °C)-99 °F (37.2 °C)] 99 °F (37.2 °C)  Pulse:  [59-77] 64  Resp:  [14-61] 31  SpO2:  [87 %-98 %] 93 %  BP: ()/(42-98) 94/55     Weight: 57.2 kg (126 lb)  Body mass index is 23.05 kg/m².       Physical Exam  Vitals and nursing note reviewed.   Constitutional:       General: She is not in acute distress.     Appearance: She is ill-appearing. She is not toxic-appearing or diaphoretic.   HENT:      Head: Normocephalic and atraumatic.      Right Ear: External ear normal.      Left Ear:  External ear normal.      Nose: Nose normal.      Mouth/Throat:      Mouth: Mucous membranes are dry.   Eyes:      General:         Right eye: No discharge.         Left eye: No discharge.   Cardiovascular:      Rate and Rhythm: Normal rate. Rhythm irregular.   Pulmonary:      Comments: Increased work of breathing, on oxygen  Abdominal:      General: Abdomen is flat.      Palpations: Abdomen is soft.   Genitourinary:     Comments: evelyn  Neurological:      Mental Status: She is alert.      Motor: Weakness present.      Comments: Patient able to tell me her name, birth date [facility]          Review of Symptoms      Symptom Assessment (ESAS 0-10 Scale)  Pain:  0  Dyspnea:  0  Anxiety:  0  Nausea:  0  Depression:  0  Anorexia:  0  Fatigue:  0  Insomnia:  0  Restlessness:  0  Agitation:  0  Unable to complete assessment due to Mental status change         Pain Assessment in Advanced Demential Scale (PAINAD)   Breathing - Independent of vocalization:  1  Negative vocalization:  1  Facial expression:  1  Body language:  1  Consolability:  0  Total:  4    Psychosocial/Cultural:   See Palliative Psychosocial Note: No  **Primary  to Follow**  Palliative Care  Consult: No      Advance Care Planning   Advance Directives:   Goals of Care: The patient and family endorses that what is most important right now is to focus on optimizing medical treatment. Address reversible causes    Accordingly, we have decided that the best plan to meet the patient's goals includes continuing with treatment       Significant Labs: All pertinent labs within the past 24 hours have been reviewed.  CBC:   Recent Labs   Lab 07/11/23  1217   WBC 12.35   HGB 6.5*   HCT 22.0*   MCV 83        BMP:  Recent Labs   Lab 07/10/23  1543 07/11/23  1217   GLU 84 148*    134*   K 3.8 3.9   CL 91* 94*   CO2 24 22*   BUN 71* 72*   CREATININE 4.9* 4.7*   CALCIUM 9.0 7.7*   MG 1.9  --      LFT:  Lab Results    Component Value Date    AST 33 07/10/2023    ALKPHOS 122 07/10/2023    BILITOT 0.4 07/10/2023     Albumin:   Albumin   Date Value Ref Range Status   07/10/2023 2.4 (L) 3.5 - 5.2 g/dL Final     Protein:   Total Protein   Date Value Ref Range Status   07/10/2023 7.9 6.0 - 8.4 g/dL Final     Lactic acid:   Lab Results   Component Value Date    LACTATE 1.9 07/10/2023    LACTATE 3.9 (HH) 07/10/2023       Significant Imaging: I have reviewed all pertinent imaging results/findings within the past 24 hours.

## 2023-07-11 NOTE — ASSESSMENT & PLAN NOTE
- as under heart failure  - s/p IVF per sepsis protocol  - on 2-4L NC, wean as tolerated  - covering with abx for possible PNA  - repeat echo

## 2023-07-11 NOTE — HPI
82 year old female with PMHx of HFrEF, COPD, DM, HTN, DVT brought in to the ED by EMS due to change in mental status over the past 3 days. Patient altered and history obtained from ED physician and  at bedside. Patient's  reports that the patient has been confused and has been vomiting and complaining of abdominal pain with poor PO intake and he has noticed decreased urine output as well. Patient has not been ambulating due to a fall and recent amputation of R toes. In the ED, patient noted to be hypoxic requiring 2-4L NC, BNP elevated and CXR with some fluid and possible PNA. Patient started on sepsis protocol due to leukocytosis, low BP, and UTI. Found to have urinary retention and rivas placed. Started on IV abx and admitted to ICU for further management.

## 2023-07-11 NOTE — PROGRESS NOTES
Pharmacokinetic Initial Assessment: IV Vancomycin    Assessment/Plan:    Initiate intravenous vancomycin with loading dose of 1000 mg once with subsequent doses when random concentrations are less than 20 mcg/mL  Desired empiric serum trough concentration is 10 to 20 mcg/mL  Draw vancomycin random level on 7/11/23 at 0300.  Pharmacy will continue to follow and monitor vancomycin.      Please contact pharmacy at extension [phone] with any questions regarding this assessment.     Thank you for the consult,   Bishop Green       Patient brief summary:  Nelsy Marino is a 82 y.o. female initiated on antimicrobial therapy with IV Vancomycin for treatment of suspected sepsis    Drug Allergies:   Review of patient's allergies indicates:   Allergen Reactions    Motrin [ibuprofen] Itching       Actual Body Weight:   57.3 kg    Renal Function:   Estimated Creatinine Clearance: 7 mL/min (A) (based on SCr of 4.9 mg/dL (H)).,     Dialysis Method (if applicable):  N/A    CBC (last 72 hours):  Recent Labs   Lab Result Units 07/10/23  1546   WBC K/uL 12.98*   Hemoglobin g/dL 7.7*   Hematocrit % 24.8*   Platelets K/uL 477*   Gran % % 80.8*   Lymph % % 11.3*   Mono % % 6.2   Eosinophil % % 0.9   Basophil % % 0.1   Differential Method  Automated       Metabolic Panel (last 72 hours):  Recent Labs   Lab Result Units 07/10/23  1543 07/10/23  1706   Sodium mmol/L 141  --    Potassium mmol/L 3.8  --    Chloride mmol/L 91*  --    CO2 mmol/L 24  --    Glucose mg/dL 84  --    Glucose, UA   --  Negative   BUN mg/dL 71*  --    Creatinine mg/dL 4.9*  --    Albumin g/dL 2.4*  --    Total Bilirubin mg/dL 0.4  --    Alkaline Phosphatase U/L 122  --    AST U/L 33  --    ALT U/L 10  --    Magnesium mg/dL 1.9  --    Phosphorus mg/dL 7.1*  --        Drug levels (last 3 results):  No results for input(s): VANCOMYCINRA, VANCORANDOM, VANCOMYCINPE, VANCOPEAK, VANCOMYCINTR, VANCOTROUGH in the last 72 hours.    Microbiologic Results:  Microbiology  Results (last 7 days)       Procedure Component Value Units Date/Time    Blood culture x two cultures. Draw prior to antibiotics. [[id number]] Collected: 07/10/23 1530    Order Status: Completed Specimen: Blood from Peripheral, Antecubital, Right Updated: 07/10/23 2312     Blood Culture, Routine No Growth to date    Narrative:      Aerobic and anaerobic    Blood culture x two cultures. Draw prior to antibiotics. [[id number]] Collected: 07/10/23 1539    Order Status: Completed Specimen: Blood from Peripheral, Wrist, Left Updated: 07/10/23 2312     Blood Culture, Routine No Growth to date    Narrative:      Aerobic and anaerobic    Urine culture [[id number]] Collected: 07/10/23 1706    Order Status: No result Specimen: Urine Updated: 07/10/23 3938

## 2023-07-11 NOTE — ASSESSMENT & PLAN NOTE
Patient with Hypoxic Respiratory failure which is Acute.  she is not on home oxygen. Supplemental oxygen was provided and noted-      .   Signs/symptoms of respiratory failure include- lethargy. Contributing diagnoses includes - Pleural effusion Labs and images were reviewed. Patient Has recent ABG, which has been reviewed. Will treat underlying causes and adjust management of respiratory failure as follows-   · Ct abd with small bilateral effusion  · bnp 1800s  · Echo 3/23 with diastolic dysfunction   · Suspect volume overload +/- CAP  · Broad spectrum antibiotics + diuretic  · DNR per Dr. Phillips

## 2023-07-11 NOTE — PROGRESS NOTES
81 yo female ex smoker w/ PMHx Chronic hypoxic respiratory failure at home on O2, HTN, CHF, HL, DM is here w/ AMS.   Noted to be hypotensive in the ED.   Now s/p 2-3L IVF bolus, admitted to the ICU for further observation for ongoing sepsis.   Source is unclear, ? CAP Vs UTI.     CT head:  - No acute intracranial abnormality.     CXR:  - Patient is rotated.  Cardiac size is prominent, similar to prior imaging.  Slight blunting of the lateral costophrenic angles bilaterally noted which may reflect a small amount of pleural fluid.  Lungs demonstrate underinflation.  There are increased interstitial markings with some superimposed bibasilar opacities which may reflect mild edema, overall findings are fairly similar to the previous imaging.  The osseous structures demonstrate degenerative changes.    CT Abdomen/ pelvis:  1. Motion artifact limits evaluation of the abdomen pelvis.  2. Bilateral small pleural effusions, new since the previous exam, correlation with volume status advised.  3. The gallbladder is distended noting cholelithiasis.  Correlation with any right upper quadrant tenderness, further evaluation with ultrasound as warranted.  4. There are a few mildly fluid distended small bowel loops in the mid abdomen, nonspecific, early enteritis can present in this fashion.  Correlation recommended.  5. The urinary bladder is distended, correlation with any history of urinary retention or outlet obstruction.  6. Findings suggesting gastroesophageal reflux.  7. Please see above for several additional findings.    S/p pan cx, pt is on zosyn and vanco.   Adding Azithro IV for atypical bacterial infection coverage, can change to PO once pt is noted to swallow well.     BP still low. Adding levo gtt for BP MAP Goal > 60.     LOLY on CKD noted, Cr 4.3  F/up UOP    LA 3.9 > 1.9  WBC 13  BNP 1800s  PCT 1.32    ECHO in am.   Trop x 1 mildly positive, demand ischemia Vs NSTEMI  Pt Hg 7.7, her baseline   Holding antiplatelet  agents for now.   Cardio review in am.     Hx DVT  SCDs for now  Can start chemo prophylaxis if now s/s active bleeding.     Please call for further assistance.   Hypoglycemia protocol. Low ISS for sugar management.     Aspiration precautions.

## 2023-07-11 NOTE — HPI
Patient is 82 y.o. female  has a past medical history of CHF (congestive heart failure), Diabetes mellitus, DVT (deep venous thrombosis), Hypertension, Miscarriage, and On home oxygen therapy. presented to [facility] on 7/10/23 with AMS x 3 days.  +h/o nausea and vomitting.      Initial lab was significant for mild leukocytosis of 12.9, anemia  6.5/22, LOLY 71/4.9, with elevated bnp of 1856 and procal 1.32.  urine with many bacteria.  Patient was started onbroad spectrum antibiotics.  Pulmonary was consulted for further inputs.      During my initial evaluation, patient is alert and oriented to self.  No oriented to situation.  Sating well on 6 lpm.  Patient without local deficit.

## 2023-07-11 NOTE — NURSING
[facility] Intensive Care  ICU Shift Summary  Date: 7/11/2023      Prehospitalization: Home  Admit Date / LOS : 7/10/2023/ 1 days    Diagnosis: <principal problem not specified>    Consults:        Active: Nephro, Palliative, Pulm CC, SW, Spiritual Care, and Wound Care       Needed: N/A     Code Status: DNR   Advanced Directive: <no information>    LDA:  Lines/Drains/Airways       Drain  Duration                  Urethral Catheter 07/10/23 1706 18 Fr. 1 day              Peripheral Intravenous Line  Duration                  Peripheral IV - Single Lumen 07/10/23 1545 20 G Right Antecubital 1 day         Peripheral IV - Single Lumen 07/10/23 2002 20 G Left;Posterior Hand <1 day                  Central Lines/Site/Justification:Pressors  Urinary Cath/Order/Justification:Urinary Retention    Vasopressors/Infusions:    NORepinephrine bitartrate-D5W 0.2 mcg/kg/min (07/11/23 6114)          GOALS: Volume/ Hemodynamic: MAP >65                     RASS: N/A    Pain Management: none       Pain Controlled: yes     Rhythm: NSR and SB    Respiratory Device: HFNC                      Most Recent SBT/ SAT: N/A       MOVE Screen: FAIL  ICU Liberation: no    VTE Prophylaxis: Mechanical  Mobility: Bedrest  Stress Ulcer Prophylaxis: Yes    Isolation: No active isolations    Dietary:   Current Diet Order   Procedures    Diet NPO Except for: Medication, Ice Chips, Sips with Medication     Order Specific Question:   Except for     Answer:   Medication     Order Specific Question:   Except for     Answer:   Ice Chips     Order Specific Question:   Except for     Answer:   Sips with Medication      Tolerance: no  Advancement: no    I & O (24h):    Intake/Output Summary (Last 24 hours) at 7/11/2023 1748  Last data filed at 7/11/2023 1600  Gross per 24 hour   Intake 3318.7 ml   Output 855 ml   Net 2463.7 ml        Restraints: No    Significant Dates:  Post Op Date: N/A  Rescue Date: N/A  Imaging/ Diagnostics: N/A    Noteworthy Labs:   none    COVID Test: (--)  CBC/Anemia Labs: Coags:    Recent Labs   Lab 07/10/23  1546 07/11/23  1217 07/11/23  1403   WBC 12.98* 12.35  --    HGB 7.7* 6.5* 6.6*   HCT 24.8* 22.0* 21.4*   * 445  --    MCV 80* 83  --    RDW 17.2* 17.1*  --     No results for input(s): PT, INR, APTT in the last 168 hours.     Chemistries:   Recent Labs   Lab 07/10/23  1543 07/11/23  1217    134*   K 3.8 3.9   CL 91* 94*   CO2 24 22*   BUN 71* 72*   CREATININE 4.9* 4.7*   CALCIUM 9.0 7.7*   PROT 7.9  --    BILITOT 0.4  --    ALKPHOS 122  --    ALT 10  --    AST 33  --    MG 1.9  --    PHOS 7.1*  --         Cardiac Enzymes: Ejection Fractions:    Recent Labs     07/10/23  1543 07/11/23  1403   TROPONINI 0.049* 0.047*    EF   Date Value Ref Range Status   03/09/2023 65 % Final        POCT Glucose: HbA1c:    Recent Labs   Lab 07/10/23  2230 07/11/23  0747 07/11/23  1117   POCTGLUCOSE 169* 205* 185*    Hemoglobin A1C   Date Value Ref Range Status   06/20/2023 6.5 (H) 4.0 - 5.6 % Final     Comment:     ADA Screening Guidelines:  5.7-6.4%  Consistent with prediabetes  >or=6.5%  Consistent with diabetes    High levels of fetal hemoglobin interfere with the HbA1C  assay. Heterozygous hemoglobin variants (HbS, HgC, etc)do  not significantly interfere with this assay.   However, presence of multiple variants may affect accuracy.             ICU LOS 20h  Level of Care: Critical Care    Chart Check: 12 HR Done  Shift Summary/Plan for the shift: PT remains in ICU oxygen increased to 8L HFNC. Sats stable at >90%. Levo infusing new orders for PICC line. Spoke with Dr. Jesse garcia for PICC line per MD. Notified Dr. Molina and Dr. Phillips of pt urine output of 25cc. Notified Dr. Phillips of H/H resulted at 6.5/22. new orders given. Blood consent in chart. PT was complaining of leg pain new orders for PRN percocet. Wound care RN at bedside skin assessment completed.  at bedside. Pt oriented to self and place. No falls, injuries, or  skin breakdown.

## 2023-07-11 NOTE — CONSULTS
"[facility] [place] - Intensive Care  Palliative Medicine  Consult Note    Patient Name: Nelsy Marino  MRN: [id number]  Admission Date: 7/10/2023  Hospital Length of Stay: 1 days  Code Status: DNR   Attending Provider: Jacobo Phillips MD  Consulting Provider: Geena Ramires NP  Primary Care Physician: Juan F Garay  Principal Problem:<principal problem not specified>    Patient information was obtained from patient, spouse/SO, past medical records, ER records and primary team.      Inpatient consult to Palliative Care  Consult performed by: Geena Ramires NP  Consult ordered by: Alex Jaramillo MD  Reason for consult: [facility]      Assessment/Plan:   Palliative encounter:  Advance Care Planning   -Discussed in ICU IDT rounds this am  -Palliative consult recevied  -discussed with Pulmonary Dr Jaramillo  -chart reviewed in detail  -discussed with bedside nurse Peggy  -Dr Phillips primary met with patient's spouse earlier per his request and explained patient's current clinical condition and the decision to make patient a DNR was made.    -at time of consult patient is in ICU. She is alert and oriented to self and place but is very weak, falling asleep at times. She states she is tired and asked why she feels so bad. I did explain to her in simple terms that her heart and lungs are doing well. She states "oh, not my heart again"   I did let her know she is very sick. She nodded yes as to agree.   I did ask her compassionately about her beliefs about dying and she said she believes in God and that she will go to [facility]. I asked her when she dies does she want to go peacefully and comfortably or does she want us to put her on a life support machine to keep her alive and she said "no let me go."   Patient was so weak that I was unable to continue the conversation   -spouse has left for the day      Urinary retention  - rivas placed      Acute hypoxemic respiratory failure       -multifactorial   -Heart failure   -PNA; abx " given   -mgmt per primary    LOLY (acute kidney injury)   -mgmt per primary     Sepsis with acute organ dysfunction  - U/A dirty, CXR with possible PNA, BCx in process  - mgmt per primary  -on vasopressor     Diastolic CHF, chronic  - mgmt per primary     Type 2 diabetes mellitus with chronic kidney disease, with long-term current use of insulin  -noted     Essential hypertension  -noted        Thank you for your consult. I will follow-up with patient. Please contact us if you have any additional questions.    Subjective:       HPI: per chart review 82 year old female with PMHx of HFrEF, COPD, DM, HTN, DVT brought in to the ED by EMS due to change in mental status over the past 3 days. Patient altered and history obtained from ED physician and  at bedside. Patient's  reports that the patient has been confused and has been vomiting and complaining of abdominal pain with poor PO intake and he has noticed decreased urine output as well. Patient has not been ambulating due to a fall and recent amputation of R toes. In the ED, patient noted to be hypoxic requiring 2-4L NC, BNP elevated and CXR with some fluid and possible PNA. Patient started on sepsis protocol due to leukocytosis, low BP, and UTI. Found to have urinary retention and rivas placed. Started on IV abx and admitted to ICU for further management.          Hospital Course:  No notes on file        Past Medical History:   Diagnosis Date    CHF (congestive heart failure)     Diabetes mellitus     DVT (deep venous thrombosis)     Hypertension     Miscarriage     On home oxygen therapy     PRN       Past Surgical History:   Procedure Laterality Date     femoral vascular surgery       ANGIOGRAPHY OF LOWER EXTREMITY Right 2/23/2023    Procedure: Angiogram Extremity Unilateral;  Surgeon: Danny Dunbar MD;  Location: [facility] OR;  Service: Vascular;  Laterality: Right;  RN PHONE PREOP 2/20/2023----CK CONSENT    ANGIOGRAPHY OF LOWER EXTREMITY Right  4/4/2023    Procedure: Angiogram Extremity Unilateral - possible endovascular intervention;  Surgeon: René Simms MD;  Location: [facility] OR 2ND FLR;  Service: Vascular;  Laterality: Right;  19.6 mins.  740.81 mGy  73.3399 Gy.cm  58ml dye  local - 1ml    COLONOSCOPY N/A 5/18/2023    Procedure: COLONOSCOPY;  Surgeon: Cuong Aguiar MD;  Location: [facility] ENDO;  Service: Endoscopy;  Laterality: N/A;    CREATION OF FEMORAL-FEMORAL ARTERY BYPASS WITH GRAFT Bilateral 3/2/2023    Procedure: CREATION, BYPASS, ARTERIAL, FEMORAL TO FEMORAL, USING GRAFT, LEFT FEMORAL ENDARTERECTOMY, LEFT ILIAC ARTERY STENT PLACEMENT, RIGHT LOWER EXTREMITY ANGIOGRAM AND RIGHT SFA  ANGIOPLASTY;  Surgeon: Danny Dunbar MD;  Location: [facility] OR 2ND FLR;  Service: Vascular;  Laterality: Bilateral;   744.47 MGY  175.36 Gycm  Flouro time 20.5 mins      CREATION OF ILIOFEMORAL ARTERY BYPASS Right 3/20/2019    Procedure: CREATION, BYPASS, ARTERIAL, ILIAC TO FEMORAL, RIGHT LOWER EXTREMITY, RIGHT PROFUNDAPLASTY;  Surgeon: Danny Dunbar MD;  Location: [facility] OR;  Service: Vascular;  Laterality: Right;  11:00AM START PER NAZANIN RICHARDS PREOP 3/13/2019  ACT'S, CELL SAVER, GRAFTS, BOOK WATER---NEED H/P  CONSENT IN AM------HGBA1C    CREATION OF MUSCLE ROTATIONAL FLAP Left 3/31/2023    Procedure: CREATION, FLAP, MUSCLE ROTATION;  Surgeon: Brandon Smiley MD;  Location: [facility] OR 2ND FLR;  Service: Plastics;  Laterality: Left;    ENDOSCOPY OF PROXIMAL SMALL INTESTINE N/A 5/18/2023    Procedure: ENTEROSCOPY, PROXIMAL;  Surgeon: Cuong Aguiar MD;  Location: [facility] ENDO;  Service: Endoscopy;  Laterality: N/A;    ESOPHAGOGASTRODUODENOSCOPY N/A 3/17/2023    Procedure: EGD (ESOPHAGOGASTRODUODENOSCOPY);  Surgeon: Gee Rodriguez MD;  Location: [facility] ENDO (2ND FLR);  Service: Endoscopy;  Laterality: N/A;    ESOPHAGOGASTRODUODENOSCOPY N/A 4/11/2023    Procedure: EGD (ESOPHAGOGASTRODUODENOSCOPY);  Surgeon: Nazanin Kaiser MD;  Location: [facility]  (2ND FLR);  Service: Endoscopy;  Laterality: N/A;    ESOPHAGOGASTRODUODENOSCOPY N/A 4/26/2023    Procedure: EGD (ESOPHAGOGASTRODUODENOSCOPY);  Surgeon: Gee Rodriguez MD;  Location: [facility] ENDO (2ND FLR);  Service: Endoscopy;  Laterality: N/A;    ESOPHAGOGASTRODUODENOSCOPY N/A 5/2/2023    Procedure: EGD (ESOPHAGOGASTRODUODENOSCOPY);  Surgeon: Cuong Conteh MD;  Location: [facility] (2ND FLR);  Service: Endoscopy;  Laterality: N/A;    FOOT AMPUTATION THROUGH METATARSAL Right 3/4/2023    Procedure: AMPUTATION, FOOT, TRANSMETATARSAL;  Surgeon: Benjamin Godfrey DPM;  [facility];  Service: Podiatry;  Laterality: Right;    HYSTERECTOMY      ?unsure cervix present    INCISION AND DRAINAGE Right 4/12/2019    Procedure: Incision and Drainage - R groin washout, possible muscle flap;  Surgeon: Danny Dunbar MD;  [facility];  Service: Vascular;  Laterality: Right;  groin washout    INCISION AND DRAINAGE OF GROIN Right 5/3/2019    Procedure: Incision and Drainage R groin;  Surgeon: Danny Dunbar MD;  [facility];  Service: Vascular;  Laterality: Right;    WOUND DEBRIDEMENT Left 3/31/2023    Procedure: DEBRIDEMENT, WOUND;  Surgeon: René Simms MD;  [facility];  Service: Vascular;  Laterality: Left;       Review of patient's allergies indicates:   Allergen Reactions    Motrin [ibuprofen] Itching       Medications:  Continuous Infusions:   NORepinephrine bitartrate-D5W 0.14 mcg/kg/min (07/11/23 1210)     Scheduled Meds:   albuterol-ipratropium  3 mL Nebulization Q6H    azithromycin  500 mg Intravenous Q24H    furosemide (LASIX) injection  100 mg Intravenous Once    pantoprazole  40 mg Intravenous BID    piperacillin-tazobactam (Zosyn) IV (PEDS and ADULTS) (extended infusion is not appropriate)  4.5 g Intravenous Q12H     PRN Meds:sodium chloride, acetaminophen, dextrose 50%, dextrose 50%, glucagon (human recombinant), glucose, glucose, insulin aspart  U-100, melatonin, oxyCODONE-acetaminophen, promethazine (PHENERGAN) IVPB, senna-docusate 8.6-50 mg, sodium chloride 0.9%, sodium chloride 0.9%, Pharmacy to dose Vancomycin consult **AND** vancomycin - pharmacy to dose    Family History       Problem Relation (Age of Onset)    Diabetes Father    Hypertension Father          Tobacco Use    Smoking status: Former     Types: Cigarettes     Quit date:      Years since quittin.5    Smokeless tobacco: Never   Substance and Sexual Activity    Alcohol use: No    Drug use: No    Sexual activity: Never       Review of Systems   Unable to perform ROS: Mental status change   Objective:     Vital Signs (Most Recent):  Temp: 99 °F (37.2 °C) (23 1115)  Pulse: 64 (23 1343)  Resp: (!) 31 (23 1343)  BP: (!) 94/55 (23 1115)  SpO2: (!) 93 % (23 1343) Vital Signs (24h Range):  Temp:  [97.9 °F (36.6 °C)-99 °F (37.2 °C)] 99 °F (37.2 °C)  Pulse:  [59-77] 64  Resp:  [14-61] 31  SpO2:  [87 %-98 %] 93 %  BP: ()/(42-98) 94/55     Weight: 57.2 kg (126 lb)  Body mass index is 23.05 kg/m².       Physical Exam  Vitals and nursing note reviewed.   Constitutional:       General: She is not in acute distress.     Appearance: She is ill-appearing. She is not toxic-appearing or diaphoretic.   HENT:      Head: Normocephalic and atraumatic.      Right Ear: External ear normal.      Left Ear: External ear normal.      Nose: Nose normal.      Mouth/Throat:      Mouth: Mucous membranes are dry.   Eyes:      General:         Right eye: No discharge.         Left eye: No discharge.   Cardiovascular:      Rate and Rhythm: Normal rate. Rhythm irregular.   Pulmonary:      Comments: Increased work of breathing, on oxygen  Abdominal:      General: Abdomen is flat.      Palpations: Abdomen is soft.   Genitourinary:     Comments: rivas  Neurological:      Mental Status: She is alert.      Motor: Weakness present.      Comments: Patient able to tell me her name, birth date  and that she is in [place]          Review of Symptoms      Symptom Assessment (ESAS 0-10 Scale)  Pain:  0  Dyspnea:  0  Anxiety:  0  Nausea:  0  Depression:  0  Anorexia:  0  Fatigue:  0  Insomnia:  0  Restlessness:  0  Agitation:  0  Unable to complete assessment due to Mental status change         Pain Assessment in Advanced Demential Scale (PAINAD)   Breathing - Independent of vocalization:  1  Negative vocalization:  1  Facial expression:  1  Body language:  1  Consolability:  0  Total:  4    Psychosocial/Cultural:   See Palliative Psychosocial Note: No  **Primary  to Follow**  Palliative Care  Consult: No      Advance Care Planning   Advance Directives:   Goals of Care: The patient and family endorses that what is most important right now is to focus on optimizing medical treatment. Address reversible causes    Accordingly, we have decided that the best plan to meet the patient's goals includes continuing with treatment       Significant Labs: All pertinent labs within the past 24 hours have been reviewed.  CBC:   Recent Labs   Lab 07/11/23  1217   WBC 12.35   HGB 6.5*   HCT 22.0*   MCV 83        BMP:  Recent Labs   Lab 07/10/23  1543 07/11/23  1217   GLU 84 148*    134*   K 3.8 3.9   CL 91* 94*   CO2 24 22*   BUN 71* 72*   CREATININE 4.9* 4.7*   CALCIUM 9.0 7.7*   MG 1.9  --      LFT:  Lab Results   Component Value Date    AST 33 07/10/2023    ALKPHOS 122 07/10/2023    BILITOT 0.4 07/10/2023     Albumin:   Albumin   Date Value Ref Range Status   07/10/2023 2.4 (L) 3.5 - 5.2 g/dL Final     Protein:   Total Protein   Date Value Ref Range Status   07/10/2023 7.9 6.0 - 8.4 g/dL Final     Lactic acid:   Lab Results   Component Value Date    LACTATE 1.9 07/10/2023    LACTATE 3.9 (HH) 07/10/2023       Significant Imaging: I have reviewed all pertinent imaging results/findings within the past 24 hours.        I spent a total of 70 minutes on the day of the visit. This includes  face to face time in discussion of goals of care, symptom assessment, coordination of care and emotional support.  This also includes non-face to face time preparing to see the patient (eg, review of tests/imaging), obtaining and/or reviewing separately obtained history, documenting clinical information in the electronic or other health record, independently interpreting results and communicating results to the patient/family/caregiver, or care coordinator.    Geena Ramires, NP  Palliative Medicine  [facility] [place] - Intensive Care

## 2023-07-11 NOTE — SUBJECTIVE & OBJECTIVE
Past Medical History:   Diagnosis Date    CHF (congestive heart failure)     Diabetes mellitus     DVT (deep venous thrombosis)     Hypertension     Miscarriage     On home oxygen therapy     PRN       Past Surgical History:   Procedure Laterality Date     femoral vascular surgery       ANGIOGRAPHY OF LOWER EXTREMITY Right 2/23/2023    Procedure: Angiogram Extremity Unilateral;  Surgeon: Danny Dunbar MD;  Location: [facility] OR;  Service: Vascular;  Laterality: Right;  RN PHONE PREOP 2/20/2023----CK CONSENT    ANGIOGRAPHY OF LOWER EXTREMITY Right 4/4/2023    Procedure: Angiogram Extremity Unilateral - possible endovascular intervention;  Surgeon: René Simms MD;  Location: [facility] OR [facility] FLR;  Service: Vascular;  Laterality: Right;  19.6 mins.  740.81 mGy  73.3399 Gy.cm  58ml dye  local - 1ml    COLONOSCOPY N/A 5/18/2023    Procedure: COLONOSCOPY;  Surgeon: Cuong Aguiar MD;  Location: [facility] ENDO;  Service: Endoscopy;  Laterality: N/A;    CREATION OF FEMORAL-FEMORAL ARTERY BYPASS WITH GRAFT Bilateral 3/2/2023    Procedure: CREATION, BYPASS, ARTERIAL, FEMORAL TO FEMORAL, USING GRAFT, LEFT FEMORAL ENDARTERECTOMY, LEFT ILIAC ARTERY STENT PLACEMENT, RIGHT LOWER EXTREMITY ANGIOGRAM AND RIGHT SFA  ANGIOPLASTY;  Surgeon: Danny Dunbar MD;  [address]  Service: Vascular;  Laterality: Bilateral;   744.47 MGY  175.36 Gycm  Flouro time 20.5 mins      CREATION OF ILIOFEMORAL ARTERY BYPASS Right 3/20/2019    Procedure: CREATION, BYPASS, ARTERIAL, ILIAC TO FEMORAL, RIGHT LOWER EXTREMITY, RIGHT PROFUNDAPLASTY;  Surgeon: Danny Dunbar MD;  Location: [facility] OR;  Service: Vascular;  Laterality: Right;  11:00AM START PER ANNE RICHARDS PREOP 3/13/2019  ACT'S, CELL SAVER, GRAFTS, BOOK WATER---NEED H/P  CONSENT IN AM------HGBA1C    CREATION OF MUSCLE ROTATIONAL FLAP Left 3/31/2023    Procedure: CREATION, FLAP, MUSCLE ROTATION;  Surgeon: Brandon Smiley MD;  Location: [address]  FLR;  Service: Plastics;  Laterality: Left;    ENDOSCOPY OF PROXIMAL SMALL INTESTINE N/A 5/18/2023    Procedure: ENTEROSCOPY, PROXIMAL;  Surgeon: Cuong Aguiar MD;  Location: [facility] ENDO;  Service: Endoscopy;  Laterality: N/A;    ESOPHAGOGASTRODUODENOSCOPY N/A 3/17/2023    Procedure: EGD (ESOPHAGOGASTRODUODENOSCOPY);  Surgeon: Gee Rodriguez MD;  Location: [facility] ENDO (2ND FLR);  Service: Endoscopy;  Laterality: N/A;    ESOPHAGOGASTRODUODENOSCOPY N/A 4/11/2023    Procedure: EGD (ESOPHAGOGASTRODUODENOSCOPY);  Surgeon: Nazanin Kaiser MD;  Location: [facility] ENDO (2ND FLR);  Service: Endoscopy;  Laterality: N/A;    ESOPHAGOGASTRODUODENOSCOPY N/A 4/26/2023    Procedure: EGD (ESOPHAGOGASTRODUODENOSCOPY);  Surgeon: Gee Rodriguez MD;  Location: [facility] ENDO (2ND FLR);  Service: Endoscopy;  Laterality: N/A;    ESOPHAGOGASTRODUODENOSCOPY N/A 5/2/2023    Procedure: EGD (ESOPHAGOGASTRODUODENOSCOPY);  Surgeon: Cuong Conteh MD;  Location: [facility] ENDO (2ND FLR);  Service: Endoscopy;  Laterality: N/A;    FOOT AMPUTATION THROUGH METATARSAL Right 3/4/2023    Procedure: AMPUTATION, FOOT, TRANSMETATARSAL;  Surgeon: Benjamin Godfrey DPM;  [facility];  Service: Podiatry;  Laterality: Right;    HYSTERECTOMY      ?unsure cervix present    INCISION AND DRAINAGE Right 4/12/2019    Procedure: Incision and Drainage - R groin washout, possible muscle flap;  Surgeon: Danny Dunbar MD;  [facility];  Service: Vascular;  Laterality: Right;  groin washout    INCISION AND DRAINAGE OF GROIN Right 5/3/2019    Procedure: Incision and Drainage R groin;  Surgeon: Danny Dunbar MD;  [facility];  Service: Vascular;  Laterality: Right;    WOUND DEBRIDEMENT Left 3/31/2023    Procedure: DEBRIDEMENT, WOUND;  Surgeon: René Simms MD;  Location: [facility] OR 2ND FLR;  Service: Vascular;  Laterality: Left;       Review of patient's allergies indicates:   Allergen Reactions    Motrin  [ibuprofen] Itching       Family History       Problem Relation (Age of Onset)    Diabetes Father    Hypertension Father          Tobacco Use    Smoking status: Former     Types: Cigarettes     Quit date: 1980     Years since quittin.5    Smokeless tobacco: Never   Substance and Sexual Activity    Alcohol use: No    Drug use: No    Sexual activity: Never         Review of Systems   Unable to perform ROS: Mental status change   Objective:     Vital Signs (Most Recent):  Temp: 99 °F (37.2 °C) (23 1115)  Pulse: 63 (23 1115)  Resp: (!) 28 (23 1115)  BP: (!) 94/55 (23 1115)  SpO2: (!) 90 % (23 111) Vital Signs (24h Range):  Temp:  [97.9 °F (36.6 °C)-99 °F (37.2 °C)] 99 °F (37.2 °C)  Pulse:  [59-77] 63  Resp:  [14-61] 28  SpO2:  [87 %-98 %] 90 %  BP: ()/(42-98) 94/55     Weight: 57.2 kg (126 lb)  Body mass index is 23.05 kg/m².      Intake/Output Summary (Last 24 hours) at 2023 1339  Last data filed at 2023 1200  Gross per 24 hour   Intake 3253.6 ml   Output 855 ml   Net 2398.6 ml        Physical Exam  Vitals reviewed.   Constitutional:       Appearance: She is ill-appearing.   HENT:      Head: Normocephalic and atraumatic.   Eyes:      General: No scleral icterus.  Cardiovascular:      Rate and Rhythm: Normal rate and regular rhythm.   Pulmonary:      Effort: Pulmonary effort is normal.      Comments: On 4L NC  Abdominal:      Palpations: Abdomen is soft.      Tenderness: There is no abdominal tenderness. There is no guarding.   Genitourinary:     Comments: [place] catheter in place  Musculoskeletal:      Comments: Right foot with amputated toes   Skin:     Coloration: Skin is pale. Skin is not jaundiced.   Neurological:      Mental Status: She is disoriented.        Vents:       Lines/Drains/Airways       Drain  Duration                  Urethral Catheter 07/10/23 1706 18 Fr. <1 day              Peripheral Intravenous Line  Duration                  Peripheral IV -  Single Lumen 07/10/23 1545 20 G Right Antecubital <1 day         Peripheral IV - Single Lumen 07/10/23 2002 20 G Left;Posterior Hand <1 day                    Significant Labs:    CBC/Anemia Profile:  Recent Labs   Lab 07/10/23  1546 07/11/23  1217   WBC 12.98* 12.35   HGB 7.7* 6.5*   HCT 24.8* 22.0*   * 445   MCV 80* 83   RDW 17.2* 17.1*        Chemistries:  Recent Labs   Lab 07/10/23  1543 07/11/23  1217    134*   K 3.8 3.9   CL 91* 94*   CO2 24 22*   BUN 71* 72*   CREATININE 4.9* 4.7*   CALCIUM 9.0 7.7*   ALBUMIN 2.4*  --    PROT 7.9  --    BILITOT 0.4  --    ALKPHOS 122  --    ALT 10  --    AST 33  --    MG 1.9  --    PHOS 7.1*  --        ABGs: No results for input(s): PH, PCO2, HCO3, POCSATURATED, BE in the last 48 hours.  Blood Culture:   Recent Labs   Lab 07/10/23  1530 07/10/23  1539   LABBLOO No Growth to date No Growth to date     Cardiac Markers: No results for input(s): CKMB, TROPONINT, MYOGLOBIN in the last 48 hours.  Lactic Acid:   Recent Labs   Lab 07/10/23  1543 07/10/23  2005   LACTATE 3.9* 1.9     Troponin:   Recent Labs   Lab 07/10/23  1543   TROPONINI 0.049*     Urine Culture:   Recent Labs   Lab 07/10/23  1706   LABURIN No significant isolate to date     Echo 3/9/23  The estimated ejection fraction is 65%.  The left ventricle is normal in size with normal systolic function.  Grade II left ventricular diastolic dysfunction.  Normal right ventricular size with normal right ventricular systolic function.  Mild left atrial enlargement.  Mild-to-moderate mitral regurgitation.  There is pulmonary hypertension.  The estimated PA systolic pressure is 55 mmHg.  Intermediate central venous pressure (8 mmHg).  Significant Imaging:   I have reviewed all pertinent imaging results/findings within the past 24 hours.  CT: I have reviewed all pertinent results/findings within the past 24 hours and my personal findings are:  trace effusion bilaterally  CXR: I have reviewed all pertinent  results/findings within the past 24 hours and my personal findings are:  cm and mild blunting of costophrenic angle.

## 2023-07-11 NOTE — ASSESSMENT & PLAN NOTE
- sCr up from baseline, pt with urinary retention and UTI  - good UOP after rivas placed, monitor I/O  - repeat BMP in AM

## 2023-07-11 NOTE — PLAN OF CARE
Problem: Adult Inpatient Plan of Care  Goal: Plan of Care Review  Outcome: Ongoing, Progressing  Goal: Patient-Specific Goal (Individualized)  Outcome: Ongoing, Progressing  Goal: Absence of Hospital-Acquired Illness or Injury  Outcome: Ongoing, Progressing  Goal: Optimal Comfort and Wellbeing  Outcome: Ongoing, Progressing  Goal: Readiness for Transition of Care  Outcome: Ongoing, Progressing     Problem: Diabetes Comorbidity  Goal: Blood Glucose Level Within Targeted Range  Outcome: Ongoing, Progressing     Problem: Adjustment to Illness (Sepsis/Septic Shock)  Goal: Optimal Coping  Outcome: Ongoing, Progressing     Problem: Infection Progression (Sepsis/Septic Shock)  Goal: Absence of Infection Signs and Symptoms  Outcome: Ongoing, Progressing     Problem: Nutrition Impaired (Sepsis/Septic Shock)  Goal: Optimal Nutrition Intake  Outcome: Ongoing, Progressing

## 2023-07-11 NOTE — NURSING
[facility] [place]  Nurses Note -- 4 Eyes      7/11/2023       Skin assessed on: Q Shift      [x] No Pressure Injuries Present    [x]Prevention Measures Documented    [] Yes LDA  for Pressure Injury Previously documented     [] Yes New Pressure Injury Discovered   [] LDA for New Pressure Injury Added      Attending RN:  Peggy Thomas RN     Second RN:  Kadi RICHARDS

## 2023-07-11 NOTE — PLAN OF CARE
Information provided by Mr Marino       07/11/23 9727   Readmission   Why were you hospitalized in the last 30 days? CHF   Why were you readmitted? Alarmed about signs/symptoms   When you left the hospital how did you feel? OK   When you left the hospital where did you go? Home with Home Health   Did patient/caregiver refused recommended DC plan? No   Tell me about what happened between when you left the hospital and the day you returned. Progressive lethargy and decreased urine.   When did you start not feeling well? 24 hours before admit   Did you try to manage your symptoms your self? No   What did you do? Home health nurse came to see her   Did you call anyone? Yes   Who did you call? Home Health Nurse   Did you try to see or did see a doctor or nurse before you came? Yes   Were you seen? No   Why? No same day appt   Did you have  a follow-up appointment on discharge? Yes   Did you go? Yes   Was this a planned readmission? No

## 2023-07-11 NOTE — CONSULTS
"  [facility] - [place] - Intensive Care  Adult Nutrition  Consult Note    SUMMARY     Recommendations     1. When medically acceptable, advance diet as tolerated to clear liquids. Continue advancing diet to 2000 kcal diet.   2. At diet advancement, Recommend Boost Glucose Control with meals to help meet EEN/EPN.  3. Consider Dylon to promote wound healing  4. Continue to monitor weights and labs    Goals: 1. Meet % EEN/EPN by RD follow up  Nutrition Goal Status: new  Communication of RD Recs:  (POC)    Assessment and Plan    Nutrition Problem  Inadequate energy intake    Related to (etiology):   Diagnosis related symptoms    Signs and Symptoms (as evidenced by):   NPO status    Interventions/Recommendations (treatment strategy):  Commercial food beverage  Collaboration with medical providers    Nutrition Diagnosis Status:   New     Reason for Assessment    Reason For Assessment: consult (wounds)  Diagnosis:  (no active principal problem)  Relevant Medical History:   Past Medical History:   Diagnosis Date    CHF (congestive heart failure)     Diabetes mellitus     DVT (deep venous thrombosis)     Hypertension     Miscarriage     On home oxygen therapy     PRN      Interdisciplinary Rounds: did not attend  General Information Comments: Consulted previously for large nonhealing wounds. Consulted today for lack of appetite. Pt with 23 lb weight loss x 1 month. (18.2% weight loss x 1 month). Pt BMI 23.10, normal BMI. No intake recorded in chart. Reports decrease in appetite and vomiting. LBM 7/3/23  Nutrition Discharge Planning: diabetic 2000 kcal diet    Nutrition Risk Screen    Nutrition Risk Screen: large or nonhealing wound, burn or pressure injury    Nutrition/Diet History    Food Allergies: NKFA    Anthropometrics    Temp: 99 °F (37.2 °C)  Height Method: Stated  Height: 5' 2" (157.5 cm)  Height (inches): 62 in  Weight Method: Bed Scale  Weight: 57.2 kg (126 lb)  Weight (lb): 126 lb  Ideal Body Weight (IBW), Female: 110 " "lb  % Ideal Body Weight, Female (lb): 114.84 %  BMI (Calculated): 23  BMI Grade: 18.5-24.9 - normal       Lab/Procedures/Meds    Pertinent Labs Reviewed: reviewed  BMP  Lab Results   Component Value Date     (L) 07/11/2023    K 3.9 07/11/2023    CL 94 (L) 07/11/2023    CO2 22 (L) 07/11/2023    BUN 72 (H) 07/11/2023    CREATININE 4.7 (H) 07/11/2023    CALCIUM 7.7 (L) 07/11/2023    ANIONGAP 18 (H) 07/11/2023    EGFRNORACEVR 9 (A) 07/11/2023      Pertinent Medications Reviewed: reviewed  Current Outpatient Medications   Medication Instructions    acetaminophen (TYLENOL) 1,000 mg, Oral, Every 8 hours    amLODIPine (NORVASC) 10 mg, Oral, Daily    atorvastatin (LIPITOR) 40 mg, Oral, Nightly    carvediloL (COREG) 25 mg, Oral, 2 times daily    cephALEXin (KEFLEX) 500 mg, Oral, Every 12 hours    diphenhydrAMINE (BENADRYL) 25 mg, Oral, Daily PRN    DULoxetine (CYMBALTA) 30 mg, Oral, 2 times daily    fluconazole (DIFLUCAN) 400 mg, Oral, Daily    furosemide (LASIX) 40 mg, Oral, Daily    hydrALAZINE (APRESOLINE) 75 mg, Oral, Every 8 hours    insulin syringe-needle,dispos. 0.3 mL 30 gauge x 5/16" Syrg Misc.(Non-Drug; Combo Route)    LEVEMIR FLEXPEN 5 Units, Subcutaneous, Nightly    oxyCODONE (ROXICODONE) 5 mg, Oral, Every 12 hours PRN    oxyCODONE-acetaminophen (PERCOCET) 5-325 mg per tablet 1 tablet, Oral, Every 8 hours PRN    pantoprazole (PROTONIX) 40 mg, Oral, Daily    pen needle, diabetic 32 gauge x 5/32" Ndle 1 each, Subcutaneous, 4 times daily    senna (SENOKOT) 8.6 mg tablet 1 tablet, Oral, Daily    vitamin D (VITAMIN D3) 1,000 Units, Oral, Daily        Estimated/Assessed Needs    Weight Used For Calorie Calculations: 57.2 kg (126 lb)  Energy Calorie Requirements (kcal): 1279 kcal  Energy Need Method: Melinda Pacheco  Protein Requirements: 57 g  Weight Used For Protein Calculations: 57.2 kg (126 lb)     Estimated Fluid Requirement Method: RDA Method  RDA Method (mL): 1279         Nutrition Prescription " Ordered    Current Diet Order: Diabetic 2000 kcal diet    Evaluation of Received Nutrient/Fluid Intake    I/O: +1.8 L  Energy Calories Required: not meeting needs  Protein Required: not meeting needs  Fluid Required: not meeting needs  Comments: LBM 7/3/23  % Intake of Estimated Energy Needs: 0 - 25 %  % Meal Intake: NPO    Nutrition Risk    Level of Risk/Frequency of Follow-up: low - moderate       Monitor and Evaluation    Food and Nutrient Intake: food and beverage intake  Food and Nutrient Adminstration: diet order  Knowledge/Beliefs/Attitudes: food and nutrition knowledge/skill, beliefs and attitudes  Physical Activity and Function: nutrition-related ADLs and IADLs, factors affecting access to physical activity  Anthropometric Measurements: weight, weight change, body mass index  Biochemical Data, Medical Tests and Procedures: glucose/endocrine profile  Nutrition-Focused Physical Findings: overall appearance       Nutrition Follow-Up    RD Follow-up?: Yes    Liza Carlisle, Registration Eligible, Provisional LDN

## 2023-07-11 NOTE — PLAN OF CARE
Case Management Assessment     PCP: Juan F Garay  Pharmacy: [facility]     Patient Arrived From: home  Existing Help at Home: spouse, sister, sister-in-law and niece    Barriers to Discharge: none    Discharge Plan:    A. Home health    B. tbd        This patient has been screened for Case Management needs.  Treatment is ongoing at this time.     Patient does not have a living will or advance directive.  Mr Marino was not interested in advance directive.  He stated that they have been  for 59 years and he knows her wishes and he will abide by them.    CM provided Mr Marino with contact info and encouraged him to call for any discharge needs.  CM will continue to follow while in the ICU and assist with DC planning as needed.         07/11/23 4509   Discharge Assessment   Assessment Type Discharge Planning Assessment   Confirmed/corrected address, phone number and insurance Yes   Confirmed Demographics Correct on Facesheet   Source of Information family   If unable to respond/provide information was family/caregiver contacted? Yes   Contact Name/Number SpouseDimas [phone]   Communicated JUAN ALBERTO with patient/caregiver Yes   People in Home spouse   Do you expect to return to your current living situation? Yes   Do you have help at home or someone to help you manage your care at home? Yes   Who are your caregiver(s) and their phone number(s)? spouse, sister, sister-in-law and niece   Prior to hospitilization cognitive status: Not Oriented to Time   Current cognitive status: Unable to Assess  (non verbal, mumbling)   Walking or Climbing Stairs ambulation difficulty, dependent   Dressing/Bathing bathing difficulty, dependent   Do you have any problems with: Needs other help;Errands/Grocery   Home Accessibility wheelchair accessible   Home Layout Able to live on 1st floor   Equipment Currently Used at Home oxygen;wheelchair;walker, rolling;rollator   Readmission within 30 days? Yes    Patient currently being followed by outpatient case management? No   Do you currently have service(s) that help you manage your care at home? No   Do you take prescription medications? Yes   Do you have prescription coverage? Yes   Coverage [employer]   Do you have any problems affording any of your prescribed medications? No   Is the patient taking medications as prescribed? yes   Who is going to help you get home at discharge? Spouse   How do you get to doctors appointments? family or friend will provide   Are you on dialysis? No   Do you take coumadin? No   Discharge Plan A Home Health   Discharge Plan B   (tbd)   DME Needed Upon Discharge    (tbd)   Discharge Plan discussed with: Spouse/sig other   Transition of Care Barriers None   OTHER   Name(s) of People in Home Dimas Marino, spouse

## 2023-07-11 NOTE — ASSESSMENT & PLAN NOTE
- U/A dirty, CXR with possible PNA, BCx in process  - continue broad spectrum abx with IV vanc, zosyn, and azithromycin  - s/p 30 cc/kg of IVF, low dose levophed ordered if needed to maintain MAP > 65

## 2023-07-11 NOTE — CONSULTS
[place] - Intensive Care  Pulmonology  Consult Note    Patient Name: Nelsy Marino  MRN: [id number]  Admission Date: 7/10/2023  Hospital Length of Stay: 1 days  Code Status: DNR  Attending Physician: Jacobo Phillips MD  Primary Care Provider: Juan F Garay   Principal Problem: <principal problem not specified>    [unfilled]  Subjective:     HPI:  Patient is 82 y.o. female  has a past medical history of CHF (congestive heart failure), Diabetes mellitus, DVT (deep venous thrombosis), Hypertension, Miscarriage, and On home oxygen therapy. presented to [facility] on 7/10/23 with AMS x 3 days.  +h/o nausea and vomitting.      Initial lab was significant for mild leukocytosis of 12.9, anemia  6.5/22, LOLY 71/4.9, with elevated bnp of 1856 and procal 1.32.  urine with many bacteria.  Patient was started onbroad spectrum antibiotics.  Pulmonary was consulted for further inputs.      During my initial evaluation, patient is alert and oriented to self.  No oriented to situation.  Sating well on 6 lpm.  Patient without local deficit.          Past Medical History:   Diagnosis Date    CHF (congestive heart failure)     Diabetes mellitus     DVT (deep venous thrombosis)     Hypertension     Miscarriage     On home oxygen therapy     PRN       Past Surgical History:   Procedure Laterality Date     femoral vascular surgery       ANGIOGRAPHY OF LOWER EXTREMITY Right 2/23/2023    Procedure: Angiogram Extremity Unilateral;  Surgeon: Danny Dunbar MD;  Location: [facility];  Service: Vascular;  Laterality: Right;  RN PHONE PREOP 2/20/2023----CK CONSENT    ANGIOGRAPHY OF LOWER EXTREMITY Right 4/4/2023    Procedure: Angiogram Extremity Unilateral - possible endovascular intervention;  Surgeon: René Simms MD;  Location: [facility] OR [address] [place] [address];  Service: Vascular;  Laterality: Right;  19.6 mins.  740.81 mGy  73.3399 Gy.cm  58ml dye  local - 1ml    COLONOSCOPY N/A 5/18/2023    Procedure: COLONOSCOPY;  Surgeon:  Cuong Agiuar MD;  Location: [facility] ENDO;  Service: Endoscopy;  Laterality: N/A;    CREATION OF FEMORAL-FEMORAL ARTERY BYPASS WITH GRAFT Bilateral 3/2/2023    Procedure: CREATION, BYPASS, ARTERIAL, FEMORAL TO FEMORAL, USING GRAFT, LEFT FEMORAL ENDARTERECTOMY, LEFT ILIAC ARTERY STENT PLACEMENT, RIGHT LOWER EXTREMITY ANGIOGRAM AND RIGHT SFA  ANGIOPLASTY;  Surgeon: Danny Dunbar MD;  Location: [facility] OR 2ND FLR;  Service: Vascular;  Laterality: Bilateral;   744.47 MGY  175.36 Gycm  Flouro time 20.5 mins      CREATION OF ILIOFEMORAL ARTERY BYPASS Right 3/20/2019    Procedure: CREATION, BYPASS, ARTERIAL, ILIAC TO FEMORAL, RIGHT LOWER EXTREMITY, RIGHT PROFUNDAPLASTY;  Surgeon: Danny Dunbar MD;  Location: [facility] OR;  Service: Vascular;  Laterality: Right;  11:00AM START PER NAZANIN RICHARDS PREOP 3/13/2019  ACT'S, CELL SAVER, GRAFTS, BOOK WATER---NEED H/P  CONSENT IN AM------HGBA1C    CREATION OF MUSCLE ROTATIONAL FLAP Left 3/31/2023    Procedure: CREATION, FLAP, MUSCLE ROTATION;  Surgeon: Brandon Smiley MD;  Location: [facility] OR 2ND FLR;  Service: Plastics;  Laterality: Left;    ENDOSCOPY OF PROXIMAL SMALL INTESTINE N/A 5/18/2023    Procedure: ENTEROSCOPY, PROXIMAL;  Surgeon: Cuong Aguiar MD;  Location: [facility] ENDO;  Service: Endoscopy;  Laterality: N/A;    ESOPHAGOGASTRODUODENOSCOPY N/A 3/17/2023    Procedure: EGD (ESOPHAGOGASTRODUODENOSCOPY);  Surgeon: Gee Rodriguez MD;  Location: [facility] ENDO (2ND FLR);  Service: Endoscopy;  Laterality: N/A;    ESOPHAGOGASTRODUODENOSCOPY N/A 4/11/2023    Procedure: EGD (ESOPHAGOGASTRODUODENOSCOPY);  Surgeon: Nazanin Kaiser MD;  Location: [facility] ENDO (2ND FLR);  Service: Endoscopy;  Laterality: N/A;    ESOPHAGOGASTRODUODENOSCOPY N/A 4/26/2023    Procedure: EGD (ESOPHAGOGASTRODUODENOSCOPY);  Surgeon: Gee Rodriguez MD;  Location: [facility] ENDO (2ND FLR);  Service: Endoscopy;  Laterality: N/A;    ESOPHAGOGASTRODUODENOSCOPY N/A 5/2/2023    Procedure: EGD  (ESOPHAGOGASTRODUODENOSCOPY);  Surgeon: Cuong Conteh MD;  Location: [facility] ENDO (2ND FLR);  Service: Endoscopy;  Laterality: N/A;    FOOT AMPUTATION THROUGH METATARSAL Right 3/4/2023    Procedure: AMPUTATION, FOOT, TRANSMETATARSAL;  Surgeon: Benjamin Godfrey DPM;  [facility];  Service: Podiatry;  Laterality: Right;    HYSTERECTOMY      ?unsure cervix present    INCISION AND DRAINAGE Right 2019    Procedure: Incision and Drainage - R groin washout, possible muscle flap;  Surgeon: Danny Dunbar MD;  [facility];  Service: Vascular;  Laterality: Right;  groin washout    INCISION AND DRAINAGE OF GROIN Right 5/3/2019    Procedure: Incision and Drainage R groin;  Surgeon: Danny Dunbar MD;  [facility];  Service: Vascular;  Laterality: Right;    WOUND DEBRIDEMENT Left 3/31/2023    Procedure: DEBRIDEMENT, WOUND;  Surgeon: René Simms MD;  [facility];  Service: Vascular;  Laterality: Left;       Review of patient's allergies indicates:   Allergen Reactions    Motrin [ibuprofen] Itching       Family History       Problem Relation (Age of Onset)    Diabetes Father    Hypertension Father          Tobacco Use    Smoking status: Former     Types: Cigarettes     Quit date: 1980     Years since quittin.5    Smokeless tobacco: Never   Substance and Sexual Activity    Alcohol use: No    Drug use: No    Sexual activity: Never         Review of Systems   Unable to perform ROS: Mental status change   Objective:     Vital Signs (Most Recent):  Temp: 99 °F (37.2 °C) (23 1115)  Pulse: 63 (23 111)  Resp: (!) 28 (23 111)  BP: (!) 94/55 (23 1115)  SpO2: (!) 90 % (23 111) Vital Signs (24h Range):  Temp:  [97.9 °F (36.6 °C)-99 °F (37.2 °C)] 99 °F (37.2 °C)  Pulse:  [59-77] 63  Resp:  [14-61] 28  SpO2:  [87 %-98 %] 90 %  BP: ()/(42-98) 94/55     Weight: 57.2 kg (126 lb)  Body mass index is 23.05  kg/m².      Intake/Output Summary (Last 24 hours) at 7/11/2023 1339  Last data filed at 7/11/2023 1200  Gross per 24 hour   Intake 3253.6 ml   Output 855 ml   Net 2398.6 ml        Physical Exam  Vitals reviewed.   Constitutional:       Appearance: She is ill-appearing.   HENT:      Head: Normocephalic and atraumatic.   Eyes:      General: No scleral icterus.  Cardiovascular:      Rate and Rhythm: Normal rate and regular rhythm.   Pulmonary:      Effort: Pulmonary effort is normal.      Comments: On 4L NC  Abdominal:      Palpations: Abdomen is soft.      Tenderness: There is no abdominal tenderness. There is no guarding.   Genitourinary:     Comments: [place] catheter in place  Musculoskeletal:      Comments: Right foot with amputated toes   Skin:     Coloration: Skin is pale. Skin is not jaundiced.   Neurological:      Mental Status: She is disoriented.        Vents:       Lines/Drains/Airways       Drain  Duration                  Urethral Catheter 07/10/23 1706 18 Fr. <1 day              Peripheral Intravenous Line  Duration                  Peripheral IV - Single Lumen 07/10/23 1545 20 G Right Antecubital <1 day         Peripheral IV - Single Lumen 07/10/23 2002 20 G Left;Posterior Hand <1 day                    Significant Labs:    CBC/Anemia Profile:  Recent Labs   Lab 07/10/23  1546 07/11/23  1217   WBC 12.98* 12.35   HGB 7.7* 6.5*   HCT 24.8* 22.0*   * 445   MCV 80* 83   RDW 17.2* 17.1*        Chemistries:  Recent Labs   Lab 07/10/23  1543 07/11/23  1217    134*   K 3.8 3.9   CL 91* 94*   CO2 24 22*   BUN 71* 72*   CREATININE 4.9* 4.7*   CALCIUM 9.0 7.7*   ALBUMIN 2.4*  --    PROT 7.9  --    BILITOT 0.4  --    ALKPHOS 122  --    ALT 10  --    AST 33  --    MG 1.9  --    PHOS 7.1*  --        ABGs: No results for input(s): PH, PCO2, HCO3, POCSATURATED, BE in the last 48 hours.  Blood Culture:   Recent Labs   Lab 07/10/23  1530 07/10/23  1539   LABBLOO No Growth to date No Growth to date     Cardiac  Markers: No results for input(s): CKMB, TROPONINT, MYOGLOBIN in the last 48 hours.  Lactic Acid:   Recent Labs   Lab 07/10/23  1543 07/10/23  2005   LACTATE 3.9* 1.9     Troponin:   Recent Labs   Lab 07/10/23  1543   TROPONINI 0.049*     Urine Culture:   Recent Labs   Lab 07/10/23  1706   LABURIN No significant isolate to date     Echo 3/9/23   The estimated ejection fraction is 65%.   The left ventricle is normal in size with normal systolic function.   Grade II left ventricular diastolic dysfunction.   Normal right ventricular size with normal right ventricular systolic function.   Mild left atrial enlargement.   Mild-to-moderate mitral regurgitation.   There is pulmonary hypertension.   The estimated PA systolic pressure is 55 mmHg.   Intermediate central venous pressure (8 mmHg).  Significant Imaging:   I have reviewed all pertinent imaging results/findings within the past 24 hours.  CT: I have reviewed all pertinent results/findings within the past 24 hours and my personal findings are:  trace effusion bilaterally  CXR: I have reviewed all pertinent results/findings within the past 24 hours and my personal findings are:  cm and mild blunting of costophrenic angle.        ABG  No results for input(s): PH, PO2, PCO2, HCO3, BE in the last 168 hours.  Assessment/Plan:     Neuro  Encephalopathy, metabolic  · Alert and oriented to self but not time, place or situation  · No focal deficit on exam  · Ct head negative for acute process  · Suspect metabolic a/w uremia +/- infection  · Will monitor    Pulmonary  Acute hypoxemic respiratory failure  Patient with Hypoxic Respiratory failure which is Acute.  she is not on home oxygen. Supplemental oxygen was provided and noted-      .   Signs/symptoms of respiratory failure include- lethargy. Contributing diagnoses includes - Pleural effusion Labs and images were reviewed. Patient Has recent ABG, which has been reviewed. Will treat underlying causes and adjust  management of respiratory failure as follows-   · Ct abd with small bilateral effusion  · bnp 1800s  · Echo 3/23 with diastolic dysfunction   · Suspect volume overload +/- CAP  · Broad spectrum antibiotics + diuretic  · DNR per Dr. Phillips    Cardiac/Vascular  Diastolic CHF, chronic  Patient is identified as having Diastolic (HFpEF) heart failure that is Acute on chronic. CHF is currently uncontrol due to effusion and worsening oxygen requirement. Latest ECHO performed and demonstrates- Results for orders placed during the hospital encounter of 02/27/23    Echo    Interpretation Summary  · The estimated ejection fraction is 65%.  · The left ventricle is normal in size with normal systolic function.  · Grade II left ventricular diastolic dysfunction.  · Normal right ventricular size with normal right ventricular systolic function.  · Mild left atrial enlargement.  · Mild-to-moderate mitral regurgitation.  · There is pulmonary hypertension.  · The estimated PA systolic pressure is 55 mmHg.  · Intermediate central venous pressure (8 mmHg).  . Continue Furosemide and monitor clinical status closely. Monitor on telemetry. Patient is on CHF pathway.  Monitor strict Is&Os and daily weights.  Place on fluid restriction of 1 L. Cardiology has not been any consulted. Continue to stress to patient importance of self efficacy and  on diet for CHF. Last BNP reviewed- and noted below   Recent Labs   Lab 07/10/23  1543   BNP 1,856*   .    · Repeat echo and trend troponin    Renal/  LOLY (acute kidney injury)  · Bun/creat 71/4.9 upon admission  · Ct abd without evidence of hydronephrosis  · Bladder scan without evidence of retention.    · Urine analysis with evidence of infection  · Monitor with diuresis  · Nephrology consulted.      ID  Septic shock  · Low dose levophed  · Urine vs pulmonary   · Blood culture and urine clture pending.  · Unable to get respiratory culture due to mental  status  · Vanc/zosyn/azithromycin  · Repeat echo pending    Oncology  Acute blood loss anemia  ·  H/o Admitted to the hospital May 2023 with hypokalemia and found to have GIB  · EGD/colonoscopy on 5/18/23 showed AVM and non-bleeding gastric ulcer on endoscopy and hemorrage in the ascending colon on colonoscopy  · No overt evidence of gib or retroperitoneal bleed per ct abd  · Transfusion pending  · Will trend h/h    Palliative Care  Goals of care, counseling/discussion  · Attempt to contact  without success.  · dnr per Dr. Phillips  · Appreciate palliative care inputs.            Thank you for your consult. I will follow-up with patient. Please contact us if you have any additional questions.     Alex Jaramillo MD  Pulmonology  [facility] - [facility]      Critical Care Time: 50  minutes  Critical secondary to respiratory failure, acute renal failure, encephlopathy     Critical care was time spent personally by me on the following activities: development of treatment plan with patient or surrogate and bedside caregivers, discussions with consultants, evaluation of patient's response to treatment, examination of patient, ordering and performing treatments and interventions, ordering and review of laboratory studies, ordering and review of radiographic studies, pulse oximetry, re-evaluation of patient's condition.  This critical care time did not overlap with that of any other provider or involve time for any procedures.

## 2023-07-11 NOTE — PLAN OF CARE
Recommendations     1. When medically acceptable, advance diet as tolerated to clear liquids. Continue advancing diet to 2000 kcal diet.   2. At diet advancement, Recommend Boost Glucose Control with meals to help meet EEN/EPN.  3. Consider Dylon to promote wound healing  4. Continue to monitor weights and labs    Goals: 1. Meet % EEN/EPN by RD follow up  Nutrition Goal Status: new  Communication of RD Recs:  (POC)    Assessment and Plan    Nutrition Problem  Inadequate energy intake    Related to (etiology):   Diagnosis related symptoms    Signs and Symptoms (as evidenced by):   NPO status    Interventions/Recommendations (treatment strategy):  Commercial food beverage  Collaboration with medical providers    Nutrition Diagnosis Status:   New

## 2023-07-11 NOTE — ASSESSMENT & PLAN NOTE
· Bun/creat 71/4.9 upon admission  · Ct abd without evidence of hydronephrosis  · Bladder scan without evidence of retention.    · Urine analysis with evidence of infection  · Monitor with diuresis  · Nephrology consulted.

## 2023-07-11 NOTE — PLAN OF CARE
Pt aaox1 to self. Pt on tele with nsr noted. Resp even and unlabored on o2@4l per nc with no distress noted. Pt had tachypnea noted at times. Pt has NS at 50cc/hr infusing to #20 right ac piv and norepinephrine at 0.3mcg/kg/min infusing to #20 left hand piv. Both without redness edema or pain noted. Pt has rivas patent and draining dark yellow urine to gravity. Pt has left groin incision poa, left knee scab poa, right toes amputated inscion healed poa, left heal unstageable pressure ulcer poa and stage 2 sacral ulcer poa noted. Left groin, right foot and left knee maryann. Left heal and sacral area with dressings cd&I. Pt is on 1800cc PO fluid restriction. Pt denies complaint at present.  at bedside. Bed low position with sr up and call light in reach.

## 2023-07-11 NOTE — ASSESSMENT & PLAN NOTE
· Attempt to contact  without success.  · dnr per Dr. Phillips  · Appreciate palliative care inputs.

## 2023-07-11 NOTE — ASSESSMENT & PLAN NOTE
- elevated BNP and pt requiring 2-4L of oxygen  - s/p IVF in the ED per sepsis protocol  - repeat echo

## 2023-07-11 NOTE — ASSESSMENT & PLAN NOTE
·  H/o Admitted to the hospital May 2023 with hypokalemia and found to have GIB  · EGD/colonoscopy on 5/18/23 showed AVM and non-bleeding gastric ulcer on endoscopy and hemorrage in the ascending colon on colonoscopy  · No overt evidence of gib or retroperitoneal bleed per ct abd  · Transfusion pending  · Will trend h/h

## 2023-07-11 NOTE — ASSESSMENT & PLAN NOTE
· Low dose levophed  · Urine vs pulmonary   · Blood culture and urine clture pending.  · Unable to get respiratory culture due to mental status  · Vanc/zosyn/azithromycin  · Repeat echo pending

## 2023-07-11 NOTE — PROCEDURES
"Nelsy Marino is a 82 y.o. female patient.    Temp: 98.4 °F (36.9 °C) (07/11/23 1530)  Pulse: 66 (07/11/23 1745)  Resp: 20 (07/11/23 1745)  BP: (!) 99/46 (07/11/23 1745)  SpO2: (!) 94 % (07/11/23 1745)  Weight: 57.2 kg (126 lb) (07/11/23 1208)  Height: 5' 2" (157.5 cm) (07/10/23 2110)    PICC  Date/Time: 7/11/2023 6:11 PM  Consent Done: Yes  Time out: Immediately prior to procedure a time out was called to verify the correct patient, procedure, equipment, support staff and site/side marked as required  Indications: med administration and vascular access  Anesthesia: local infiltration  Local anesthetic: lidocaine 1% without epinephrine  Anesthetic Total (mL): 3  Preparation: skin prepped with ChloraPrep  Skin prep agent dried: skin prep agent completely dried prior to procedure  Sterile barriers: all five maximum sterile barriers used - cap, mask, sterile gown, sterile gloves, and large sterile sheet  Hand hygiene: hand hygiene performed prior to central venous catheter insertion  Location details: left basilic  Catheter type: triple lumen  Catheter size: 5 Fr  Catheter Length: 40cm    Ultrasound guidance: yes  Vessel Caliber: medium and patent, compressibility normal  Vascular Doppler: not done  Needle advanced into vessel with real time Ultrasound guidance.  Guidewire confirmed in vessel.  Sterile sheath used.  no esophageal manometryNumber of attempts: 1  Post-procedure: blood return through all ports, chlorhexidine patch and sterile dressing applied  Estimated blood loss (mL): 0.5  Specimens: No  Implants: No  Assessment: placement verified by x-ray and successful placement  Complications: none  Comments: Unable to thread guidewire past R axilla/subclavial region. Sterile dsg applied to site after hemostasis achieved.         Name SUSIE Ortiz  7/11/2023    "

## 2023-07-11 NOTE — SUBJECTIVE & OBJECTIVE
Past Medical History:   Diagnosis Date    CHF (congestive heart failure)     Diabetes mellitus     DVT (deep venous thrombosis)     Hypertension     Miscarriage     On home oxygen therapy     PRN       Past Surgical History:   Procedure Laterality Date     femoral vascular surgery       ANGIOGRAPHY OF LOWER EXTREMITY Right 2/23/2023    Procedure: Angiogram Extremity Unilateral;  Surgeon: Danny Dunbar MD;  Location: [facility] OR;  Service: Vascular;  Laterality: Right;  RN PHONE PREOP 2/20/2023----CK CONSENT    ANGIOGRAPHY OF LOWER EXTREMITY Right 4/4/2023    Procedure: Angiogram Extremity Unilateral - possible endovascular intervention;  Surgeon: René Simms MD;  [facility];  Service: Vascular;  Laterality: Right;  19.6 mins.  740.81 mGy  73.3399 Gy.cm  58ml dye  local - 1ml    COLONOSCOPY N/A 5/18/2023    Procedure: COLONOSCOPY;  Surgeon: Cuong Aguiar MD;  Location: [facility] ENDO;  Service: Endoscopy;  Laterality: N/A;    CREATION OF FEMORAL-FEMORAL ARTERY BYPASS WITH GRAFT Bilateral 3/2/2023    Procedure: CREATION, BYPASS, ARTERIAL, FEMORAL TO FEMORAL, USING GRAFT, LEFT FEMORAL ENDARTERECTOMY, LEFT ILIAC ARTERY STENT PLACEMENT, RIGHT LOWER EXTREMITY ANGIOGRAM AND RIGHT SFA  ANGIOPLASTY;  Surgeon: Danny Dunbar MD;  [facility];  Service: Vascular;  Laterality: Bilateral;   744.47 MGY  175.36 Gycm  Flouro time 20.5 mins      CREATION OF ILIOFEMORAL ARTERY BYPASS Right 3/20/2019    Procedure: CREATION, BYPASS, ARTERIAL, ILIAC TO FEMORAL, RIGHT LOWER EXTREMITY, RIGHT PROFUNDAPLASTY;  Surgeon: Danny Dubnar MD;  Location: [facility] OR;  Service: Vascular;  Laterality: Right;  11:00AM START PER ANNE RICHARDS PREOP 3/13/2019  ACT'S, CELL SAVER, GRAFTS, BOOK WATER---NEED H/P  CONSENT IN AM------HGBA1C    CREATION OF MUSCLE ROTATIONAL FLAP Left 3/31/2023    Procedure: CREATION, FLAP, MUSCLE ROTATION;  Surgeon: Brandon Smiley MD;  [facility];  Service:  Plastics;  Laterality: Left;    ENDOSCOPY OF PROXIMAL SMALL INTESTINE N/A 5/18/2023    Procedure: ENTEROSCOPY, PROXIMAL;  Surgeon: Cuong Aguiar MD;  Location: [facility] ENDO;  Service: Endoscopy;  Laterality: N/A;    ESOPHAGOGASTRODUODENOSCOPY N/A 3/17/2023    Procedure: EGD (ESOPHAGOGASTRODUODENOSCOPY);  Surgeon: Gee Rodriguez MD;  Location: [facility] ENDO (2ND FLR);  Service: Endoscopy;  Laterality: N/A;    ESOPHAGOGASTRODUODENOSCOPY N/A 4/11/2023    Procedure: EGD (ESOPHAGOGASTRODUODENOSCOPY);  Surgeon: Nazanin Kaiser MD;  Location: [facility] ENDO (2ND FLR);  Service: Endoscopy;  Laterality: N/A;    ESOPHAGOGASTRODUODENOSCOPY N/A 4/26/2023    Procedure: EGD (ESOPHAGOGASTRODUODENOSCOPY);  Surgeon: Gee Rodriguez MD;  Location: [facility] ENDO (2ND FLR);  Service: Endoscopy;  Laterality: N/A;    ESOPHAGOGASTRODUODENOSCOPY N/A 5/2/2023    Procedure: EGD (ESOPHAGOGASTRODUODENOSCOPY);  Surgeon: Cuong Conteh MD;  Location: [facility] ENDO (2ND FLR);  Service: Endoscopy;  Laterality: N/A;    FOOT AMPUTATION THROUGH METATARSAL Right 3/4/2023    Procedure: AMPUTATION, FOOT, TRANSMETATARSAL;  Surgeon: Benjamin Godfrey DPM;  Location: [facility] OR [address] [place] [address];  Service: Podiatry;  Laterality: Right;    HYSTERECTOMY      ?unsure cervix present    INCISION AND DRAINAGE Right 4/12/2019    Procedure: Incision and Drainage - R groin washout, possible muscle flap;  Surgeon: Danny Dunbar MD;  [facility];  Service: Vascular;  Laterality: Right;  groin washout    INCISION AND DRAINAGE OF GROIN Right 5/3/2019    Procedure: Incision and Drainage R groin;  Surgeon: Danny Dunbar MD;  [facility];  Service: Vascular;  Laterality: Right;    WOUND DEBRIDEMENT Left 3/31/2023    Procedure: DEBRIDEMENT, WOUND;  Surgeon: René Simms MD;  [facility];  Service: Vascular;  Laterality: Left;       Review of patient's allergies indicates:   Allergen Reactions    Motrin [ibuprofen] Itching  "      No current facility-administered medications on file prior to encounter.     Current Outpatient Medications on File Prior to Encounter   Medication Sig    amLODIPine (NORVASC) 10 MG tablet Take 1 tablet (10 mg total) by mouth once daily.    atorvastatin (LIPITOR) 40 MG tablet Take 1 tablet (40 mg total) by mouth every evening.    carvediloL (COREG) 25 MG tablet Take 1 tablet (25 mg total) by mouth 2 (two) times daily.    cephALEXin (KEFLEX) 500 MG capsule Take 1 capsule (500 mg total) by mouth every 12 (twelve) hours.    diphenhydrAMINE (BENADRYL) 25 mg capsule Take 25 mg by mouth daily as needed for Itching.    DULoxetine (CYMBALTA) 30 MG capsule Take 1 capsule (30 mg total) by mouth 2 (two) times daily.    fluconazole (DIFLUCAN) 200 MG Tab Take 2 tablets (400 mg total) by mouth once daily.    furosemide (LASIX) 40 MG tablet Take 1 tablet (40 mg total) by mouth once daily.    hydrALAZINE (APRESOLINE) 25 MG tablet Take 3 tablets (75 mg total) by mouth every 8 (eight) hours.    pantoprazole (PROTONIX) 40 MG tablet Take 1 tablet (40 mg total) by mouth once daily.    acetaminophen (TYLENOL) 500 MG tablet Take 2 tablets (1,000 mg total) by mouth every 8 (eight) hours.    insulin detemir U-100, Levemir, 100 unit/mL (3 mL) SubQ InPn pen Inject 5 Units into the skin every evening.    insulin syringe-needle,dispos. 0.3 mL 30 gauge x 5/16" Syrg by Misc.(Non-Drug; Combo Route) route.    oxyCODONE (ROXICODONE) 5 MG immediate release tablet Take 1 tablet (5 mg total) by mouth every 12 (twelve) hours as needed.    oxyCODONE-acetaminophen (PERCOCET) 5-325 mg per tablet Take 1 tablet by mouth every 8 (eight) hours as needed for Pain.    pen needle, diabetic 32 gauge x 5/32" Ndle Inject 1 each into the skin 4 (four) times daily.    senna (SENOKOT) 8.6 mg tablet Take 1 tablet by mouth once daily.    vitamin D (VITAMIN D3) 1000 units Tab Take 1 tablet (1,000 Units total) by mouth once daily.     Family History       Problem " Relation (Age of Onset)    Diabetes Father    Hypertension Father          Tobacco Use    Smoking status: Former     Types: Cigarettes     Quit date: 1980     Years since quittin.5    Smokeless tobacco: Never   Substance and Sexual Activity    Alcohol use: No    Drug use: No    Sexual activity: Never     Review of Systems   Unable to perform ROS: Mental status change   Objective:     Vital Signs (Most Recent):  Temp: 98 °F (36.7 °C) (23)  Pulse: 66 (23)  Resp: (!) 24 (23)  BP: (!) 92/54 (23)  SpO2: 96 % (23) Vital Signs (24h Range):  Temp:  [97.9 °F (36.6 °C)-98.9 °F (37.2 °C)] 98 °F (36.7 °C)  Pulse:  [59-77] 66  Resp:  [14-61] 24  SpO2:  [87 %-98 %] 96 %  BP: ()/(42-98) 92/54     Weight: 57.3 kg (126 lb 5.2 oz)  Body mass index is 23.1 kg/m².     Physical Exam  Vitals reviewed.   Constitutional:       Appearance: She is ill-appearing.   HENT:      Head: Normocephalic and atraumatic.   Eyes:      General: No scleral icterus.  Pulmonary:      Comments: On 4L NC  Genitourinary:     Comments: [place] catheter in place  Skin:     Coloration: Skin is pale. Skin is not jaundiced.   Neurological:      Mental Status: She is disoriented.              Significant Labs: All pertinent labs within the past 24 hours have been reviewed.    Significant Imaging: I have reviewed all pertinent imaging results/findings within the past 24 hours.

## 2023-07-11 NOTE — H&P
[facility] Medicine  History & Physical    Patient Name: Nelsy Marino  MRN: [id number]  Admission Date: 7/10/2023  Attending Physician: Jenniffer Cerrato MD   Primary Care Provider: [facility]         Patient information was obtained from spouse/SO and ER records.       Subjective:     Principal Problem:<principal problem not specified>    Chief Complaint:   Chief Complaint   Patient presents with    Altered Mental Status     Family called out for AMS, on EMS arrival pt sats were 60-70, has home oxygen for prn use.  Progressive lethargy and decreased urine.  Recent toe ampuation.  Cbg 85        HPI: 82 year old female with PMHx of HFrEF, COPD, DM, HTN, DVT brought in to the ED by EMS due to change in mental status over the past 3 days. Patient altered and history obtained from ED physician and  at bedside. Patient's  reports that the patient has been confused and has been vomiting and complaining of abdominal pain with poor PO intake and he has noticed decreased urine output as well. Patient has not been ambulating due to a fall and recent amputation of R toes. In the ED, patient noted to be hypoxic requiring 2-4L NC, BNP elevated and CXR with some fluid and possible PNA. Patient started on sepsis protocol due to leukocytosis, low BP, and UTI. Found to have urinary retention and rivas placed. Started on IV abx and admitted to ICU for further management.       Past Medical History:   Diagnosis Date    CHF (congestive heart failure)     Diabetes mellitus     DVT (deep venous thrombosis)     Hypertension     Miscarriage     On home oxygen therapy     PRN       Past Surgical History:   Procedure Laterality Date     femoral vascular surgery       ANGIOGRAPHY OF LOWER EXTREMITY Right 2/23/2023    Procedure: Angiogram Extremity Unilateral;  Surgeon: Danny Dunbar MD;  Location: [facility];  Service: Vascular;  Laterality: Right;  RN PHONE PREOP 2/20/2023----CK  CONSENT    ANGIOGRAPHY OF LOWER EXTREMITY Right 4/4/2023    Procedure: Angiogram Extremity Unilateral - possible endovascular intervention;  Surgeon: René Simms MD;  Location: [facility] OR 2ND FLR;  Service: Vascular;  Laterality: Right;  19.6 mins.  740.81 mGy  73.3399 Gy.cm  58ml dye  local - 1ml    COLONOSCOPY N/A 5/18/2023    Procedure: COLONOSCOPY;  Surgeon: Cuong Aguiar MD;  Location: [facility] ENDO;  Service: Endoscopy;  Laterality: N/A;    CREATION OF FEMORAL-FEMORAL ARTERY BYPASS WITH GRAFT Bilateral 3/2/2023    Procedure: CREATION, BYPASS, ARTERIAL, FEMORAL TO FEMORAL, USING GRAFT, LEFT FEMORAL ENDARTERECTOMY, LEFT ILIAC ARTERY STENT PLACEMENT, RIGHT LOWER EXTREMITY ANGIOGRAM AND RIGHT SFA  ANGIOPLASTY;  Surgeon: Danny Dunbar MD;  [facility];  Service: Vascular;  Laterality: Bilateral;   744.47 MGY  175.36 Gycm  Flouro time 20.5 mins      CREATION OF ILIOFEMORAL ARTERY BYPASS Right 3/20/2019    Procedure: CREATION, BYPASS, ARTERIAL, ILIAC TO FEMORAL, RIGHT LOWER EXTREMITY, RIGHT PROFUNDAPLASTY;  Surgeon: Danny Dunbar MD;  Location: [facility] OR;  Service: Vascular;  Laterality: Right;  11:00AM START PER ANNE RICHARDS PREOP 3/13/2019  ACT'S, CELL SAVER, GRAFTS, BOOK WATER---NEED H/P  CONSENT IN AM------HGBA1C    CREATION OF MUSCLE ROTATIONAL FLAP Left 3/31/2023    Procedure: CREATION, FLAP, MUSCLE ROTATION;  Surgeon: Brandon Smiley MD;  Location: [facility] OR 2ND FLR;  Service: Plastics;  Laterality: Left;    ENDOSCOPY OF PROXIMAL SMALL INTESTINE N/A 5/18/2023    Procedure: ENTEROSCOPY, PROXIMAL;  Surgeon: Cuong Aguiar MD;  Location: [facility] ENDO;  Service: Endoscopy;  Laterality: N/A;    ESOPHAGOGASTRODUODENOSCOPY N/A 3/17/2023    Procedure: EGD (ESOPHAGOGASTRODUODENOSCOPY);  Surgeon: Gee Rodriguez MD;  Location: [facility] ENDO (2ND FLR);  Service: Endoscopy;  Laterality: N/A;    ESOPHAGOGASTRODUODENOSCOPY N/A 4/11/2023    Procedure: EGD  (ESOPHAGOGASTRODUODENOSCOPY);  Surgeon: Nazanin Kaiser MD;  Location: [facility] ENDO (2ND FLR);  Service: Endoscopy;  Laterality: N/A;    ESOPHAGOGASTRODUODENOSCOPY N/A 4/26/2023    Procedure: EGD (ESOPHAGOGASTRODUODENOSCOPY);  Surgeon: Gee Rodriguez MD;  Location: [facility] ENDO (2ND FLR);  Service: Endoscopy;  Laterality: N/A;    ESOPHAGOGASTRODUODENOSCOPY N/A 5/2/2023    Procedure: EGD (ESOPHAGOGASTRODUODENOSCOPY);  Surgeon: Cuong Conteh MD;  Location: [facility] ENDO (2ND FLR);  Service: Endoscopy;  Laterality: N/A;    FOOT AMPUTATION THROUGH METATARSAL Right 3/4/2023    Procedure: AMPUTATION, FOOT, TRANSMETATARSAL;  Surgeon: Benjamin Godfrey DPM;  [facility];  Service: Podiatry;  Laterality: Right;    HYSTERECTOMY      ?unsure cervix present    INCISION AND DRAINAGE Right 4/12/2019    Procedure: Incision and Drainage - R groin washout, possible muscle flap;  Surgeon: Danny Dunbar MD;  [facility];  Service: Vascular;  Laterality: Right;  groin washout    INCISION AND DRAINAGE OF GROIN Right 5/3/2019    Procedure: Incision and Drainage R groin;  Surgeon: Danny Dunbar MD;  [facility];  Service: Vascular;  Laterality: Right;    WOUND DEBRIDEMENT Left 3/31/2023    Procedure: DEBRIDEMENT, WOUND;  Surgeon: René Simms MD;  [facility];  Service: Vascular;  Laterality: Left;       Review of patient's allergies indicates:   Allergen Reactions    Motrin [ibuprofen] Itching       No current facility-administered medications on file prior to encounter.     Current Outpatient Medications on File Prior to Encounter   Medication Sig    amLODIPine (NORVASC) 10 MG tablet Take 1 tablet (10 mg total) by mouth once daily.    atorvastatin (LIPITOR) 40 MG tablet Take 1 tablet (40 mg total) by mouth every evening.    carvediloL (COREG) 25 MG tablet Take 1 tablet (25 mg total) by mouth 2 (two) times daily.    cephALEXin (KEFLEX) 500 MG capsule  "Take 1 capsule (500 mg total) by mouth every 12 (twelve) hours.    diphenhydrAMINE (BENADRYL) 25 mg capsule Take 25 mg by mouth daily as needed for Itching.    DULoxetine (CYMBALTA) 30 MG capsule Take 1 capsule (30 mg total) by mouth 2 (two) times daily.    fluconazole (DIFLUCAN) 200 MG Tab Take 2 tablets (400 mg total) by mouth once daily.    furosemide (LASIX) 40 MG tablet Take 1 tablet (40 mg total) by mouth once daily.    hydrALAZINE (APRESOLINE) 25 MG tablet Take 3 tablets (75 mg total) by mouth every 8 (eight) hours.    pantoprazole (PROTONIX) 40 MG tablet Take 1 tablet (40 mg total) by mouth once daily.    acetaminophen (TYLENOL) 500 MG tablet Take 2 tablets (1,000 mg total) by mouth every 8 (eight) hours.    insulin detemir U-100, Levemir, 100 unit/mL (3 mL) SubQ InPn pen Inject 5 Units into the skin every evening.    insulin syringe-needle,dispos. 0.3 mL 30 gauge x 5/16" Syrg by Misc.(Non-Drug; Combo Route) route.    oxyCODONE (ROXICODONE) 5 MG immediate release tablet Take 1 tablet (5 mg total) by mouth every 12 (twelve) hours as needed.    oxyCODONE-acetaminophen (PERCOCET) 5-325 mg per tablet Take 1 tablet by mouth every 8 (eight) hours as needed for Pain.    pen needle, diabetic 32 gauge x 5/32" Ndle Inject 1 each into the skin 4 (four) times daily.    senna (SENOKOT) 8.6 mg tablet Take 1 tablet by mouth once daily.    vitamin D (VITAMIN D3) 1000 units Tab Take 1 tablet (1,000 Units total) by mouth once daily.     Family History       Problem Relation (Age of Onset)    Diabetes Father    Hypertension Father          Tobacco Use    Smoking status: Former     Types: Cigarettes     Quit date:      Years since quittin.5    Smokeless tobacco: Never   Substance and Sexual Activity    Alcohol use: No    Drug use: No    Sexual activity: Never     Review of Systems   Unable to perform ROS: Mental status change   Objective:     Vital Signs (Most Recent):  Temp: 98 °F (36.7 °C) " (07/11/23 0301)  Pulse: 66 (07/11/23 0301)  Resp: (!) 24 (07/11/23 0301)  BP: (!) 92/54 (07/11/23 0301)  SpO2: 96 % (07/11/23 0301) Vital Signs (24h Range):  Temp:  [97.9 °F (36.6 °C)-98.9 °F (37.2 °C)] 98 °F (36.7 °C)  Pulse:  [59-77] 66  Resp:  [14-61] 24  SpO2:  [87 %-98 %] 96 %  BP: ()/(42-98) 92/54     Weight: 57.3 kg (126 lb 5.2 oz)  Body mass index is 23.1 kg/m².     Physical Exam  Vitals reviewed.   Constitutional:       Appearance: She is ill-appearing.   HENT:      Head: Normocephalic and atraumatic.   Eyes:      General: No scleral icterus.  Pulmonary:      Comments: On 4L NC  Genitourinary:     Comments: [place] catheter in place  Skin:     Coloration: Skin is pale. Skin is not jaundiced.   Neurological:      Mental Status: She is disoriented.              Significant Labs: All pertinent labs within the past 24 hours have been reviewed.    Significant Imaging: I have reviewed all pertinent imaging results/findings within the past 24 hours.    Assessment/Plan:     Acute cystitis  - as above      Urinary retention  - rivas placed with good UOP  - monitor output      Acute hypoxemic respiratory failure  - as under heart failure  - s/p IVF per sepsis protocol  - on 2-4L NC, wean as tolerated  - covering with abx for possible PNA  - repeat echo      LOLY (acute kidney injury)  - sCr up from baseline, pt with urinary retention and UTI  - good UOP after rivas placed, monitor I/O  - repeat BMP in AM      Sepsis with acute organ dysfunction  - U/A dirty, CXR with possible PNA, BCx in process  - continue broad spectrum abx with IV vanc, zosyn, and azithromycin  - s/p 30 cc/kg of IVF, low dose levophed ordered if needed to maintain MAP > 65      Diastolic CHF, chronic  - elevated BNP and pt requiring 2-4L of oxygen  - s/p IVF in the ED per sepsis protocol  - repeat echo      Type 2 diabetes mellitus with chronic kidney disease, with long-term current use of insulin  -SSI for now, pt with very poor PO  intake      Essential hypertension  -holding all BP meds due to sepsis        VTE Risk Mitigation (From admission, onward)         Ordered     IP VTE HIGH RISK PATIENT  Once         07/10/23 1920     Place sequential compression device  Until discontinued         07/10/23 1920     Place GABE hose  Until discontinued         07/10/23 1920     Place sequential compression device  Until discontinued         07/10/23 1920              Critical care time spent on the evaluation and treatment of severe organ dysfunction, review of pertinent labs and imaging studies, discussions with consulting providers and discussions with patient/family: > 30 minutes.       The attending portion of this evaluation, treatment, and documentation was performed per Patria Alvarez MD via Telemedicine AudioVisual using the secure griddig software platform with 2 way audio/video. The provider was located off-site and the patient is located in the hospital. The aforementioned video software was utilized to document the relevant history and physical exam            Patria Alvarez MD  Department of Hospital Medicine   [facility] [place] - Intensive Care

## 2023-07-11 NOTE — ASSESSMENT & PLAN NOTE
· Alert and oriented to self but not time, place or situation  · No focal deficit on exam  · Ct head negative for acute process  · Suspect metabolic a/w uremia +/- infection  · Will monitor

## 2023-07-11 NOTE — CLINICAL REVIEW
IP Sepsis Screen (most recent)       Sepsis Screen (IP) - 07/10/23 1932       Is the patient's history or complaint suggestive of a possible infection? Yes  -LW    Are there at least two of the following signs and symptoms present? Yes  -LW    Sepsis signs/symptoms - Tachypnea Tachypnea     >20  -LW    Sepsis signs/symptoms - WBC WBC < 4,000 or WBC > 12,000  -LW    Are any of the following organ dysfunction criteria present and not considered to be due to a chronic condition? Yes  -LW    Organ Dysfunction Criteria Creatinine > 2.0  -LW    Organ Dysfunction Criteria Lactate > 2.0  -LW    Initiate Sepsis Protocol No  -LW    Reason sepsis not considered Pt. receiving appropriate management  -LW              User Key  (r) = Recorded By, (t) = Taken By, (c) = Cosigned By      Initials Name    DARREN Yu RN

## 2023-07-11 NOTE — ASSESSMENT & PLAN NOTE
Patient is identified as having Diastolic (HFpEF) heart failure that is Acute on chronic. CHF is currently uncontrol due to effusion and worsening oxygen requirement. Latest ECHO performed and demonstrates- Results for orders placed during the hospital encounter of 02/27/23    Echo    Interpretation Summary  · The estimated ejection fraction is 65%.  · The left ventricle is normal in size with normal systolic function.  · Grade II left ventricular diastolic dysfunction.  · Normal right ventricular size with normal right ventricular systolic function.  · Mild left atrial enlargement.  · Mild-to-moderate mitral regurgitation.  · There is pulmonary hypertension.  · The estimated PA systolic pressure is 55 mmHg.  · Intermediate central venous pressure (8 mmHg).  . Continue Furosemide and monitor clinical status closely. Monitor on telemetry. Patient is on CHF pathway.  Monitor strict Is&Os and daily weights.  Place on fluid restriction of 1 L. Cardiology has not been any consulted. Continue to stress to patient importance of self efficacy and  on diet for CHF. Last BNP reviewed- and noted below   Recent Labs   Lab 07/10/23  1543   BNP 1,856*   .    · Repeat echo and trend troponin

## 2023-07-11 NOTE — CONSULTS
[facility] - Intensive Care  Wound Care    Patient Name:  Nelsy Marino   MRN:  [id number]  Date: 2023  Diagnosis: <principal problem not specified>    History:     Past Medical History:   Diagnosis Date    CHF (congestive heart failure)     Diabetes mellitus     DVT (deep venous thrombosis)     Hypertension     Miscarriage     On home oxygen therapy     PRN       Social History     Socioeconomic History    Marital status:    Tobacco Use    Smoking status: Former     Types: Cigarettes     Quit date:      Years since quittin.5    Smokeless tobacco: Never   Substance and Sexual Activity    Alcohol use: No    Drug use: No    Sexual activity: Never     Social Determinants of Health     Financial Resource Strain: Unknown    Difficulty of Paying Living Expenses: Patient refused   Food Insecurity: No Food Insecurity    Worried About Running Out of Food in the Last Year: Never true    Ran Out of Food in the Last Year: Never true   Transportation Needs: No Transportation Needs    Lack of Transportation (Medical): No    Lack of Transportation (Non-Medical): No   Physical Activity: Inactive    Days of Exercise per Week: 0 days    Minutes of Exercise per Session: 0 min   Stress: Unknown    Feeling of Stress : Patient refused   Social Connections: Unknown    Frequency of Communication with Friends and Family: More than three times a week    Frequency of Social Gatherings with Friends and Family: More than three times a week    Attends [employer] Services: Patient refused    Active Member of Clubs or Organizations: Patient refused    Attends Club or Organization Meetings: Patient refused    Marital Status:    Housing Stability: Low Risk     Unable to Pay for Housing in the Last Year: No    Number of Places Lived in the Last Year: 1    Unstable Housing in the Last Year: No       Precautions:     Allergies as of 07/10/2023 - Reviewed 07/10/2023   Allergen Reaction Noted    Motrin [ibuprofen] Itching  08/02/2017       [facility] Assessment Details/Treatment   Nursing consult per [facility] for altered skin integrity left heel, sacrum, knee, left groin  An 82 year old female admitted 7/10/23 from home with acute cystitis; urinary retention; acute hypoxemic respiratory failure; LOLY; sepsis with acute organ dysfunction; chronic diastolic CHF; DM II with CKD; essential hypertension  Patient known to me from previous admissions-   3/2/23 S/P LLE bypass and angioplasty & RLE angioplasty per Dr. Dunbar  3/4/23 S/P Right TMA per Dr. Godfrey  3/31/23 S/P Creation of left muscle rotation flap over left groin vascular graft per Dr. Smiely  During previous admissions, patient not ambulating- unable to extend left leg completely- had not walked since March; wheelchair bound  6/28 Debridement right foot per Dr. Kirkland in Podiatry Clinic and dressed with Iodosorb- referral to outpatient wound clinic  7/10 WBC 12.98 Hgb 7.7 Hct 24.8 Alb 2.4 Weight 126 lbs  6/20 A1C 6.5   On Isolibrium mattress; Jb score 14  Assessment:  Photodocumentation        Sacrum- No skin breakdown    Left posterior heel- Unstageable pressure injury- stable eschar 5 cm circular area    Left knee- Necrotic 2 cm area of moist eschar. Reports wound from fall. Trauma.     Left great toe- Dry necrotic nail bed. Arterial wound.     Left groin-site of flap for wound closure- 1 cm open wound almost closed. Scant serous drainage.  Treatment:  Local wound care to left heel and left knee- keep ischemic areas clean and dry.  Local wound care to left groin- continue local wound care with foam dressing.  Continue Pressure Injury Prevention Interventions.  Recommendations made to primary team. Orders placed.     07/11/2023

## 2023-07-11 NOTE — HPI
HPI: per chart review 82 year old female with PMHx of HFrEF, COPD, DM, HTN, DVT brought in to the ED by EMS due to change in mental status over the past 3 days. Patient altered and history obtained from ED physician and  at bedside. Patient's  reports that the patient has been confused and has been vomiting and complaining of abdominal pain with poor PO intake and he has noticed decreased urine output as well. Patient has not been ambulating due to a fall and recent amputation of R toes. In the ED, patient noted to be hypoxic requiring 2-4L NC, BNP elevated and CXR with some fluid and possible PNA. Patient started on sepsis protocol due to leukocytosis, low BP, and UTI. Found to have urinary retention and rivas placed. Started on IV abx and admitted to ICU for further management.

## 2023-07-11 NOTE — CONSULTS
\82 y o f with hx of chf dm htn dvt chronic resp insuf on home  o2 admitted with sepsis MOSF    Because rising creat renal input was requested.    Pt is awake dyspneic at rest nad confused unable to provide an adequate HPI    Denies CNS ENT CP GI  RHEUM OR DERM SX (how accurate her ROS is is unclear)    Past Medical History:   Diagnosis Date    CHF (congestive heart failure)     Diabetes mellitus     DVT (deep venous thrombosis)     Hypertension     Miscarriage     On home oxygen therapy     PRN     Past Surgical History:   Procedure Laterality Date     femoral vascular surgery       ANGIOGRAPHY OF LOWER EXTREMITY Right 2/23/2023    Procedure: Angiogram Extremity Unilateral;  Surgeon: Danny Dunbar MD;  Location: [facility] OR;  Service: Vascular;  Laterality: Right;  RN PHONE PREOP 2/20/2023----CK CONSENT    ANGIOGRAPHY OF LOWER EXTREMITY Right 4/4/2023    Procedure: Angiogram Extremity Unilateral - possible endovascular intervention;  Surgeon: René Simms MD;  Location: [facility] OR [address] [place] [address];  Service: Vascular;  Laterality: Right;  19.6 mins.  740.81 mGy  73.3399 Gy.cm  58ml dye  local - 1ml    COLONOSCOPY N/A 5/18/2023    Procedure: COLONOSCOPY;  Surgeon: Cuong Aguiar MD;  Location: [facility] ENDO;  Service: Endoscopy;  Laterality: N/A;    CREATION OF FEMORAL-FEMORAL ARTERY BYPASS WITH GRAFT Bilateral 3/2/2023    Procedure: CREATION, BYPASS, ARTERIAL, FEMORAL TO FEMORAL, USING GRAFT, LEFT FEMORAL ENDARTERECTOMY, LEFT ILIAC ARTERY STENT PLACEMENT, RIGHT LOWER EXTREMITY ANGIOGRAM AND RIGHT SFA  ANGIOPLASTY;  Surgeon: Danny Dunbar MD;  [facility];  Service: Vascular;  Laterality: Bilateral;   744.47 MGY  175.36 Gycm  Flouro time 20.5 mins      CREATION OF ILIOFEMORAL ARTERY BYPASS Right 3/20/2019    Procedure: CREATION, BYPASS, ARTERIAL, ILIAC TO FEMORAL, RIGHT LOWER EXTREMITY, RIGHT PROFUNDAPLASTY;  Surgeon: Danny Dunbar MD;  Location: [facility] OR;  Service: Vascular;  Laterality:  Right;  11:00AM START PER NAZANIN RICHARDS PREOP 3/13/2019  ACT'S, CELL SAVER, GRAFTS, BOOK WATER---NEED H/P  CONSENT IN AM------HGBA1C    CREATION OF MUSCLE ROTATIONAL FLAP Left 3/31/2023    Procedure: CREATION, FLAP, MUSCLE ROTATION;  Surgeon: Brandon Smiley MD;  [facility];  Service: Plastics;  Laterality: Left;    ENDOSCOPY OF PROXIMAL SMALL INTESTINE N/A 5/18/2023    Procedure: ENTEROSCOPY, PROXIMAL;  Surgeon: Cuong Aguiar MD;  Location: [facility];  Service: Endoscopy;  Laterality: N/A;    ESOPHAGOGASTRODUODENOSCOPY N/A 3/17/2023    Procedure: EGD (ESOPHAGOGASTRODUODENOSCOPY);  Surgeon: Gee Rodriguez MD;  Location: [facility] (2ND FLR);  Service: Endoscopy;  Laterality: N/A;    ESOPHAGOGASTRODUODENOSCOPY N/A 4/11/2023    Procedure: EGD (ESOPHAGOGASTRODUODENOSCOPY);  Surgeon: Nazanin Kaiser MD;  Location: [facility] (2ND FLR);  Service: Endoscopy;  Laterality: N/A;    ESOPHAGOGASTRODUODENOSCOPY N/A 4/26/2023    Procedure: EGD (ESOPHAGOGASTRODUODENOSCOPY);  Surgeon: Gee Rodriguez MD;  [facility]  Service: Endoscopy;  Laterality: N/A;    ESOPHAGOGASTRODUODENOSCOPY N/A 5/2/2023    Procedure: EGD (ESOPHAGOGASTRODUODENOSCOPY);  Surgeon: Cuong Conteh MD;  [facility]  Service: Endoscopy;  Laterality: N/A;    FOOT AMPUTATION THROUGH METATARSAL Right 3/4/2023    Procedure: AMPUTATION, FOOT, TRANSMETATARSAL;  Surgeon: Benjamin Godfrey DPM;  [facility];  Service: Podiatry;  Laterality: Right;    HYSTERECTOMY      ?unsure cervix present    INCISION AND DRAINAGE Right 4/12/2019    Procedure: Incision and Drainage - R groin washout, possible muscle flap;  Surgeon: Danny Dunbar MD;  Location: [facility] OR 2ND FLR;  Service: Vascular;  Laterality: Right;  groin washout    INCISION AND DRAINAGE OF GROIN Right 5/3/2019    Procedure: Incision and Drainage R groin;  Surgeon: Danny Dunbar MD;  Location: [facility] OR 2ND FLR;   Service: Vascular;  Laterality: Right;    WOUND DEBRIDEMENT Left 3/31/2023    Procedure: DEBRIDEMENT, WOUND;  Surgeon: René Simms MD;  Location: [facility] OR [address] [place] [address];  Service: Vascular;  Laterality: Left;     Review of patient's allergies indicates:   Allergen Reactions    Motrin [ibuprofen] Itching       Current Facility-Administered Medications   Medication    0.9%  NaCl infusion (for blood administration)    acetaminophen tablet 650 mg    albuterol-ipratropium 2.5 mg-0.5 mg/3 mL nebulizer solution 3 mL    azithromycin (ZITHROMAX) 500 mg in dextrose 5 % (D5W) 250 mL IVPB (Vial-Mate)    dextrose 50% injection 12.5 g    dextrose 50% injection 25 g    glucagon (human recombinant) injection 1 mg    glucose chewable tablet 16 g    glucose chewable tablet 24 g    insulin aspart U-100 pen 0-5 Units    melatonin tablet 6 mg    NORepinephrine 4 mg in dextrose 5% 250 mL infusion (premix) (titrating)    oxyCODONE-acetaminophen 5-325 mg per tablet 1 tablet    pantoprazole injection 40 mg    piperacillin-tazobactam (ZOSYN) 4.5 g in dextrose 5 % in water (D5W) 5 % 100 mL IVPB (MB+)    promethazine (PHENERGAN) 12.5 mg in dextrose 5 % (D5W) 50 mL IVPB    senna-docusate 8.6-50 mg per tablet 1 tablet    sodium chloride 0.9% flush 10 mL    sodium chloride 0.9% flush 10 mL    vancomycin - pharmacy to dose     Social History     Socioeconomic History    Marital status:    Tobacco Use    Smoking status: Former     Types: Cigarettes     Quit date: 1980     Years since quittin.5    Smokeless tobacco: Never   Substance and Sexual Activity    Alcohol use: No    Drug use: No    Sexual activity: Never     Social Determinants of Health     Financial Resource Strain: Unknown    Difficulty of Paying Living Expenses: Patient refused   Food Insecurity: No Food Insecurity    Worried About Running Out of Food in the Last Year: Never true    Ran Out of Food in the Last Year: Never true   Transportation Needs: No Transportation Needs     Lack of Transportation (Medical): No    Lack of Transportation (Non-Medical): No   Physical Activity: Inactive    Days of Exercise per Week: 0 days    Minutes of Exercise per Session: 0 min   Stress: Unknown    Feeling of Stress : Patient refused   Social Connections: Unknown    Frequency of Communication with Friends and Family: More than three times a week    Frequency of Social Gatherings with Friends and Family: More than three times a week    Attends [employer] Services: Patient refused    Active Member of Clubs or Organizations: Patient refused    Attends Club or Organization Meetings: Patient refused    Marital Status:    Housing Stability: Low Risk     Unable to Pay for Housing in the Last Year: No    Number of Places Lived in the Last Year: 1    Unstable Housing in the Last Year: No     Family History   Problem Relation Age of Onset    Diabetes Father     Hypertension Father          LABS    Recent Results (from the past 24 hour(s))   Blood culture x two cultures. Draw prior to antibiotics.    Collection Time: 07/10/23  3:30 PM    Specimen: Peripheral, Antecubital, Right; Blood   Result Value Ref Range    Blood Culture, Routine No Growth to date    Blood culture x two cultures. Draw prior to antibiotics.    Collection Time: 07/10/23  3:39 PM    Specimen: Peripheral, Wrist, Left; Blood   Result Value Ref Range    Blood Culture, Routine No Growth to date    Comprehensive metabolic panel    Collection Time: 07/10/23  3:43 PM   Result Value Ref Range    Sodium 141 136 - 145 mmol/L    Potassium 3.8 3.5 - 5.1 mmol/L    Chloride 91 (L) 95 - 110 mmol/L    CO2 24 23 - 29 mmol/L    Glucose 84 70 - 110 mg/dL    BUN 71 (H) 8 - 23 mg/dL    Creatinine 4.9 (H) 0.5 - 1.4 mg/dL    Calcium 9.0 8.7 - 10.5 mg/dL    Total Protein 7.9 6.0 - 8.4 g/dL    Albumin 2.4 (L) 3.5 - 5.2 g/dL    Total Bilirubin 0.4 0.1 - 1.0 mg/dL    Alkaline Phosphatase 122 55 - 135 U/L    AST 33 10 - 40 U/L    ALT 10 10 - 44 U/L    eGFR 8 (A) >60  mL/min/1.73 m^2    Anion Gap 26 (H) 8 - 16 mmol/L   Lactic acid, plasma #1    Collection Time: 07/10/23  3:43 PM   Result Value Ref Range    Lactate (Lactic Acid) 3.9 (HH) 0.5 - 2.2 mmol/L   Magnesium    Collection Time: 07/10/23  3:43 PM   Result Value Ref Range    Magnesium 1.9 1.6 - 2.6 mg/dL   Phosphorus    Collection Time: 07/10/23  3:43 PM   Result Value Ref Range    Phosphorus 7.1 (H) 2.7 - 4.5 mg/dL   Brain natriuretic peptide    Collection Time: 07/10/23  3:43 PM   Result Value Ref Range    BNP 1,856 (H) 0 - 99 pg/mL   Troponin I    Collection Time: 07/10/23  3:43 PM   Result Value Ref Range    Troponin I 0.049 (H) 0.000 - 0.026 ng/mL   Type & Screen    Collection Time: 07/10/23  3:43 PM   Result Value Ref Range    Indirect Josselyn POS (A)     Specimen Outdate 07/13/2023 23:59    Group & Rh    Collection Time: 07/10/23  3:43 PM   Result Value Ref Range    ABO B     Rh Type POS    Antibody identification    Collection Time: 07/10/23  3:43 PM   Result Value Ref Range    Antibody ID POS    CBC auto differential    Collection Time: 07/10/23  3:46 PM   Result Value Ref Range    WBC 12.98 (H) 3.90 - 12.70 K/uL    RBC 3.12 (L) 4.00 - 5.40 M/uL    Hemoglobin 7.7 (L) 12.0 - 16.0 g/dL    Hematocrit 24.8 (L) 37.0 - 48.5 %    MCV 80 (L) 82 - 98 fL    MCH 24.7 (L) 27.0 - 31.0 pg    MCHC 31.0 (L) 32.0 - 36.0 g/dL    RDW 17.2 (H) 11.5 - 14.5 %    Platelets 477 (H) 150 - 450 K/uL    MPV 10.0 9.2 - 12.9 fL    Immature Granulocytes 0.7 (H) 0.0 - 0.5 %    Gran # (ANC) 10.5 (H) 1.8 - 7.7 K/uL    Immature Grans (Abs) 0.09 (H) 0.00 - 0.04 K/uL    Lymph # 1.5 1.0 - 4.8 K/uL    Mono # 0.8 0.3 - 1.0 K/uL    Eos # 0.1 0.0 - 0.5 K/uL    Baso # 0.01 0.00 - 0.20 K/uL    nRBC 0 0 /100 WBC    Gran % 80.8 (H) 38.0 - 73.0 %    Lymph % 11.3 (L) 18.0 - 48.0 %    Mono % 6.2 4.0 - 15.0 %    Eosinophil % 0.9 0.0 - 8.0 %    Basophil % 0.1 0.0 - 1.9 %    Differential Method Automated    Procalcitonin    Collection Time: 07/10/23  3:46 PM   Result  Value Ref Range    Procalcitonin 1.32 (H) <0.25 ng/mL   ISTAT Lactate    Collection Time: 07/10/23  4:48 PM   Result Value Ref Range    POC Lactate 3.25 (H) 0.5 - 2.2 mmol/L    Sample VENOUS     Site Other     Allens Test N/A     DelSys Nasal Can     Mode SPONT     Flow 4     Sp02 93    Urinalysis, Reflex to Urine Culture Urine, Clean Catch    Collection Time: 07/10/23  5:06 PM    Specimen: Urine   Result Value Ref Range    Specimen UA Urine, Catheterized     Color, UA Orange (A) Yellow, Straw, Shell    Appearance, UA Cloudy (A) Clear    pH, UA 5.0 5.0 - 8.0    Specific Gravity, UA 1.015 1.005 - 1.030    Protein, UA 1+ (A) Negative    Glucose, UA Negative Negative    Ketones, UA Negative Negative    Bilirubin (UA) Negative Negative    Occult Blood UA Negative Negative    Nitrite, UA Negative Negative    Urobilinogen, UA Negative <2.0 EU/dL    Leukocytes, UA 3+ (A) Negative   Urinalysis Microscopic    Collection Time: 07/10/23  5:06 PM   Result Value Ref Range    RBC, UA 51 (H) 0 - 4 /hpf    WBC, UA >100 (H) 0 - 5 /hpf    WBC Clumps, UA Many (A) None-Rare    Bacteria Many (A) None-Occ /hpf    Yeast, UA Many (A) None    Squam Epithel, UA 3 /hpf    Non-Squam Epith 2 (A) <1/hpf /hpf    Hyaline Casts, UA 29 (A) 0-1/lpf /lpf    Unclass Ashley UA Many (A) None-Moderate    Microscopic Comment SEE COMMENT    Urine culture    Collection Time: 07/10/23  5:06 PM    Specimen: Urine   Result Value Ref Range    Urine Culture, Routine No significant isolate to date    Lactic acid, plasma #2    Collection Time: 07/10/23  8:05 PM   Result Value Ref Range    Lactate (Lactic Acid) 1.9 0.5 - 2.2 mmol/L   POCT glucose    Collection Time: 07/10/23  8:16 PM   Result Value Ref Range    POCT Glucose 145 (H) 70 - 110 mg/dL   POCT glucose    Collection Time: 07/10/23 10:30 PM   Result Value Ref Range    POCT Glucose 169 (H) 70 - 110 mg/dL   Vancomycin, Random    Collection Time: 07/11/23  4:15 AM   Result Value Ref Range    Vancomycin, Random  13.7 Not established ug/mL   POCT glucose    Collection Time: 07/11/23  7:47 AM   Result Value Ref Range    POCT Glucose 205 (H) 70 - 110 mg/dL   POCT glucose    Collection Time: 07/11/23 11:17 AM   Result Value Ref Range    POCT Glucose 185 (H) 70 - 110 mg/dL   Basic metabolic panel    Collection Time: 07/11/23 12:17 PM   Result Value Ref Range    Sodium 134 (L) 136 - 145 mmol/L    Potassium 3.9 3.5 - 5.1 mmol/L    Chloride 94 (L) 95 - 110 mmol/L    CO2 22 (L) 23 - 29 mmol/L    Glucose 148 (H) 70 - 110 mg/dL    BUN 72 (H) 8 - 23 mg/dL    Creatinine 4.7 (H) 0.5 - 1.4 mg/dL    Calcium 7.7 (L) 8.7 - 10.5 mg/dL    Anion Gap 18 (H) 8 - 16 mmol/L    eGFR 9 (A) >60 mL/min/1.73 m^2   CBC auto differential    Collection Time: 07/11/23 12:17 PM   Result Value Ref Range    WBC 12.35 3.90 - 12.70 K/uL    RBC 2.64 (L) 4.00 - 5.40 M/uL    Hemoglobin 6.5 (L) 12.0 - 16.0 g/dL    Hematocrit 22.0 (L) 37.0 - 48.5 %    MCV 83 82 - 98 fL    MCH 24.6 (L) 27.0 - 31.0 pg    MCHC 29.5 (L) 32.0 - 36.0 g/dL    RDW 17.1 (H) 11.5 - 14.5 %    Platelets 445 150 - 450 K/uL    MPV 10.4 9.2 - 12.9 fL    Immature Granulocytes 0.5 0.0 - 0.5 %    Gran # (ANC) 9.6 (H) 1.8 - 7.7 K/uL    Immature Grans (Abs) 0.06 (H) 0.00 - 0.04 K/uL    Lymph # 1.5 1.0 - 4.8 K/uL    Mono # 0.9 0.3 - 1.0 K/uL    Eos # 0.3 0.0 - 0.5 K/uL    Baso # 0.02 0.00 - 0.20 K/uL    nRBC 0 0 /100 WBC    Gran % 77.9 (H) 38.0 - 73.0 %    Lymph % 12.0 (L) 18.0 - 48.0 %    Mono % 7.4 4.0 - 15.0 %    Eosinophil % 2.0 0.0 - 8.0 %    Basophil % 0.2 0.0 - 1.9 %    Differential Method Automated    Hemoglobin    Collection Time: 07/11/23  2:03 PM   Result Value Ref Range    Hemoglobin 6.6 (L) 12.0 - 16.0 g/dL   Hematocrit    Collection Time: 07/11/23  2:03 PM   Result Value Ref Range    Hematocrit 21.4 (L) 37.0 - 48.5 %   ]    I/O last 3 completed shifts:  In: 2666.6 [I.V.:1033.6; IV Piggyback:1633]  Out: 830 [Urine:830]    Vitals:    07/11/23 1330 07/11/23 1341 07/11/23 1343 07/11/23 1345    BP: (!) 97/49  (!) 97/49 (!) 97/49   Pulse: 64 63 64 63   Resp: (!) 29 (!) 31 (!) 31 (!) 26   Temp:       TempSrc:       SpO2: (!) 90% (!) 91% (!) 93% (!) 93%   Weight:       Height:           No Jvd, Thyromegaly or Lymphadenopathy  Lungs: Fairly clear anteriorly and laterally  Cor: RRR no G or rubs  Abd: Soft benign good bowel sounds non tender  Ext: No E C C    A)  [facility] with hypotension chf sepsis mario   Oligo-anuric mario   Very active UA  Met acidosis   Anemia with recent low Irons studies   DM  Hyperpo4  Lactic acidosis improving   On pressors to maintain bp  Hx of chf  Hx of dvt  Confused     P)    Sad to say but there is not much more we could add as she seems to be maxed out in treatment plans     I am afraid that she does n ot qualify for any dialytic rx as diallyls will not improve any of her underlying conditions     Renal Diet when able   Adjust all meds to the degree of renal fx  Close follow up I/O and weights  Maintain Hydration     PX poor

## 2023-07-12 PROBLEM — Z51.5 PALLIATIVE CARE ENCOUNTER: Status: ACTIVE | Noted: 2023-02-28

## 2023-07-12 LAB
ANION GAP SERPL CALC-SCNC: 15 MMOL/L (ref 8–16)
BASOPHILS # BLD AUTO: 0.02 K/UL (ref 0–0.2)
BASOPHILS NFR BLD: 0.2 % (ref 0–1.9)
BUN SERPL-MCNC: 74 MG/DL (ref 8–23)
CALCIUM SERPL-MCNC: 7.7 MG/DL (ref 8.7–10.5)
CHLORIDE SERPL-SCNC: 92 MMOL/L (ref 95–110)
CO2 SERPL-SCNC: 25 MMOL/L (ref 23–29)
CREAT SERPL-MCNC: 4.5 MG/DL (ref 0.5–1.4)
DIFFERENTIAL METHOD: ABNORMAL
EOSINOPHIL # BLD AUTO: 0.3 K/UL (ref 0–0.5)
EOSINOPHIL NFR BLD: 2.2 % (ref 0–8)
ERYTHROCYTE [DISTWIDTH] IN BLOOD BY AUTOMATED COUNT: 16.8 % (ref 11.5–14.5)
EST. GFR  (NO RACE VARIABLE): 9 ML/MIN/1.73 M^2
GLUCOSE SERPL-MCNC: 148 MG/DL (ref 70–110)
HCT VFR BLD AUTO: 26 % (ref 37–48.5)
HGB BLD-MCNC: 8.4 G/DL (ref 12–16)
IMM GRANULOCYTES # BLD AUTO: 0.07 K/UL (ref 0–0.04)
IMM GRANULOCYTES NFR BLD AUTO: 0.6 % (ref 0–0.5)
LYMPHOCYTES # BLD AUTO: 0.8 K/UL (ref 1–4.8)
LYMPHOCYTES NFR BLD: 6.9 % (ref 18–48)
MCH RBC QN AUTO: 26.3 PG (ref 27–31)
MCHC RBC AUTO-ENTMCNC: 32.3 G/DL (ref 32–36)
MCV RBC AUTO: 82 FL (ref 82–98)
MONOCYTES # BLD AUTO: 0.5 K/UL (ref 0.3–1)
MONOCYTES NFR BLD: 4 % (ref 4–15)
NEUTROPHILS # BLD AUTO: 10.2 K/UL (ref 1.8–7.7)
NEUTROPHILS NFR BLD: 86.1 % (ref 38–73)
NRBC BLD-RTO: 0 /100 WBC
PLATELET # BLD AUTO: 371 K/UL (ref 150–450)
PMV BLD AUTO: 10 FL (ref 9.2–12.9)
POCT GLUCOSE: 156 MG/DL (ref 70–110)
POCT GLUCOSE: 158 MG/DL (ref 70–110)
POCT GLUCOSE: 159 MG/DL (ref 70–110)
POCT GLUCOSE: 161 MG/DL (ref 70–110)
POTASSIUM SERPL-SCNC: 3.5 MMOL/L (ref 3.5–5.1)
RBC # BLD AUTO: 3.19 M/UL (ref 4–5.4)
SODIUM SERPL-SCNC: 132 MMOL/L (ref 136–145)
VANCOMYCIN SERPL-MCNC: 19.3 UG/ML
WBC # BLD AUTO: 11.88 K/UL (ref 3.9–12.7)

## 2023-07-12 PROCEDURE — 94761 N-INVAS EAR/PLS OXIMETRY MLT: CPT

## 2023-07-12 PROCEDURE — 99232 PR SUBSEQUENT HOSPITAL CARE,LEVL II: ICD-10-PCS | Mod: ,,, | Performed by: NURSE PRACTITIONER

## 2023-07-12 PROCEDURE — 25000003 PHARM REV CODE 250: Performed by: INTERNAL MEDICINE

## 2023-07-12 PROCEDURE — 99291 CRITICAL CARE FIRST HOUR: CPT | Mod: ,,, | Performed by: INTERNAL MEDICINE

## 2023-07-12 PROCEDURE — 63600175 PHARM REV CODE 636 W HCPCS: Performed by: STUDENT IN AN ORGANIZED HEALTH CARE EDUCATION/TRAINING PROGRAM

## 2023-07-12 PROCEDURE — 85025 COMPLETE CBC W/AUTO DIFF WBC: CPT | Performed by: INTERNAL MEDICINE

## 2023-07-12 PROCEDURE — 99291 PR CRITICAL CARE, E/M 30-74 MINUTES: ICD-10-PCS | Mod: ,,, | Performed by: INTERNAL MEDICINE

## 2023-07-12 PROCEDURE — 20000000 HC ICU ROOM

## 2023-07-12 PROCEDURE — A4216 STERILE WATER/SALINE, 10 ML: HCPCS | Performed by: INTERNAL MEDICINE

## 2023-07-12 PROCEDURE — 25000003 PHARM REV CODE 250: Performed by: STUDENT IN AN ORGANIZED HEALTH CARE EDUCATION/TRAINING PROGRAM

## 2023-07-12 PROCEDURE — 63700000 PHARM REV CODE 250 ALT 637 W/O HCPCS: Performed by: INTERNAL MEDICINE

## 2023-07-12 PROCEDURE — 63600175 PHARM REV CODE 636 W HCPCS: Performed by: INTERNAL MEDICINE

## 2023-07-12 PROCEDURE — 25000242 PHARM REV CODE 250 ALT 637 W/ HCPCS: Performed by: STUDENT IN AN ORGANIZED HEALTH CARE EDUCATION/TRAINING PROGRAM

## 2023-07-12 PROCEDURE — 27000221 HC OXYGEN, UP TO 24 HOURS

## 2023-07-12 PROCEDURE — C9113 INJ PANTOPRAZOLE SODIUM, VIA: HCPCS | Performed by: INTERNAL MEDICINE

## 2023-07-12 PROCEDURE — 94640 AIRWAY INHALATION TREATMENT: CPT

## 2023-07-12 PROCEDURE — 99232 SBSQ HOSP IP/OBS MODERATE 35: CPT | Mod: ,,, | Performed by: NURSE PRACTITIONER

## 2023-07-12 PROCEDURE — 80048 BASIC METABOLIC PNL TOTAL CA: CPT | Performed by: INTERNAL MEDICINE

## 2023-07-12 PROCEDURE — 80202 ASSAY OF VANCOMYCIN: CPT | Performed by: HOSPITALIST

## 2023-07-12 RX ORDER — FLUCONAZOLE 100 MG/1
100 TABLET ORAL DAILY
Status: DISCONTINUED | OUTPATIENT
Start: 2023-07-12 | End: 2023-07-18 | Stop reason: HOSPADM

## 2023-07-12 RX ORDER — FUROSEMIDE 10 MG/ML
100 INJECTION INTRAMUSCULAR; INTRAVENOUS ONCE
Status: COMPLETED | OUTPATIENT
Start: 2023-07-12 | End: 2023-07-12

## 2023-07-12 RX ORDER — METOLAZONE 5 MG/1
5 TABLET ORAL DAILY
Status: DISCONTINUED | OUTPATIENT
Start: 2023-07-12 | End: 2023-07-15

## 2023-07-12 RX ADMIN — FUROSEMIDE 100 MG: 10 INJECTION, SOLUTION INTRAMUSCULAR; INTRAVENOUS at 09:07

## 2023-07-12 RX ADMIN — MUPIROCIN: 20 OINTMENT TOPICAL at 08:07

## 2023-07-12 RX ADMIN — IPRATROPIUM BROMIDE AND ALBUTEROL SULFATE 3 ML: 2.5; .5 SOLUTION RESPIRATORY (INHALATION) at 12:07

## 2023-07-12 RX ADMIN — IPRATROPIUM BROMIDE AND ALBUTEROL SULFATE 3 ML: 2.5; .5 SOLUTION RESPIRATORY (INHALATION) at 01:07

## 2023-07-12 RX ADMIN — PIPERACILLIN AND TAZOBACTAM 4.5 G: 4; .5 INJECTION, POWDER, LYOPHILIZED, FOR SOLUTION INTRAVENOUS; PARENTERAL at 04:07

## 2023-07-12 RX ADMIN — PIPERACILLIN AND TAZOBACTAM 4.5 G: 4; .5 INJECTION, POWDER, LYOPHILIZED, FOR SOLUTION INTRAVENOUS; PARENTERAL at 05:07

## 2023-07-12 RX ADMIN — OXYCODONE AND ACETAMINOPHEN 1 TABLET: 5; 325 TABLET ORAL at 08:07

## 2023-07-12 RX ADMIN — METOLAZONE 5 MG: 5 TABLET ORAL at 09:07

## 2023-07-12 RX ADMIN — IPRATROPIUM BROMIDE AND ALBUTEROL SULFATE 3 ML: 2.5; .5 SOLUTION RESPIRATORY (INHALATION) at 08:07

## 2023-07-12 RX ADMIN — PANTOPRAZOLE SODIUM 40 MG: 40 INJECTION, POWDER, FOR SOLUTION INTRAVENOUS at 08:07

## 2023-07-12 RX ADMIN — NOREPINEPHRINE BITARTRATE 0.14 MCG/KG/MIN: 4 INJECTION, SOLUTION INTRAVENOUS at 05:07

## 2023-07-12 RX ADMIN — Medication 10 ML: at 05:07

## 2023-07-12 RX ADMIN — AZITHROMYCIN MONOHYDRATE 500 MG: 500 INJECTION, POWDER, LYOPHILIZED, FOR SOLUTION INTRAVENOUS at 11:07

## 2023-07-12 RX ADMIN — Medication 10 ML: at 11:07

## 2023-07-12 RX ADMIN — NOREPINEPHRINE BITARTRATE 0.12 MCG/KG/MIN: 4 INJECTION, SOLUTION INTRAVENOUS at 04:07

## 2023-07-12 RX ADMIN — FLUCONAZOLE 100 MG: 100 TABLET ORAL at 09:07

## 2023-07-12 RX ADMIN — IPRATROPIUM BROMIDE AND ALBUTEROL SULFATE 3 ML: 2.5; .5 SOLUTION RESPIRATORY (INHALATION) at 07:07

## 2023-07-12 NOTE — ASSESSMENT & PLAN NOTE
- as under heart failure  - s/p IVF per sepsis protocol  - on 2-4L NC, wean as tolerated  - covering with abx for possible PNA  - repeat echo      Multifactorial- as per pulmonary

## 2023-07-12 NOTE — ASSESSMENT & PLAN NOTE
Patient is identified as having Diastolic (HFpEF) heart failure that is Acute on chronic. CHF is currently uncontrol due to effusion and worsening oxygen requirement. Latest ECHO performed and demonstrates- Results for orders placed during the hospital encounter of 02/27/23    Echo    Interpretation Summary  · The estimated ejection fraction is 65%.  · The left ventricle is normal in size with normal systolic function.  · Grade II left ventricular diastolic dysfunction.  · Normal right ventricular size with normal right ventricular systolic function.  · Mild left atrial enlargement.  · Mild-to-moderate mitral regurgitation.  · There is pulmonary hypertension.  · The estimated PA systolic pressure is 55 mmHg.  · Intermediate central venous pressure (8 mmHg).  . Continue Furosemide and monitor clinical status closely. Monitor on telemetry. Patient is on CHF pathway.  Monitor strict Is&Os and daily weights.  Place on fluid restriction of 1 L. Cardiology has not been any consulted. Continue to stress to patient importance of self efficacy and  on diet for CHF. Last BNP reviewed- and noted below   Recent Labs   Lab 07/10/23  1543   BNP 1,856*   .    · Repeat echo   · Troponin trending down.  Most likely combination demand ischemia and arf

## 2023-07-12 NOTE — PROGRESS NOTES
Pharmacist Renal Dose Adjustment Note    Nelsy Marino is a 82 y.o. female being treated with the medication Fluconazole    Patient Data:    Vital Signs (Most Recent):  Temp: 98.2 °F (36.8 °C) (07/12/23 0400)  Pulse: 65 (07/12/23 0737)  Resp: 20 (07/12/23 0737)  BP: (!) 118/53 (07/12/23 0630)  SpO2: 99 % (07/12/23 0737) Vital Signs (72h Range):  Temp:  [97.7 °F (36.5 °C)-99 °F (37.2 °C)]   Pulse:  [59-77]   Resp:  [14-61]   BP: ()/(37-98)   SpO2:  [87 %-99 %]      Recent Labs   Lab 07/10/23  1543 07/11/23  1217 07/12/23  0304   CREATININE 4.9* 4.7* 4.5*     Serum creatinine: 4.5 mg/dL (H) 07/12/23 0304  Estimated creatinine clearance: 7.6 mL/min (A)    Medication:Fluconazole dose: 150mg frequency daily will be changed to medication:Fluconazole dose:100mg frequency:daily.    Pharmacist's Name: Zeina Bolaños  Pharmacist's Extension: [phone]

## 2023-07-12 NOTE — PROGRESS NOTES
[facility] - Intensive Care  Critical Care Medicine  Progress Note    Patient Name: Nelsy Marino  MRN: [id number]  Admission Date: 7/10/2023  Hospital Length of Stay: 2 days  Code Status: DNR  Attending Provider: Jacobo Phillips MD  Primary Care Provider: Juan F Garay   Principal Problem: Septic shock    Subjective:     HPI:  Patient is 82 y.o. female  has a past medical history of CHF (congestive heart failure), Diabetes mellitus, DVT (deep venous thrombosis), Hypertension, Miscarriage, and On home oxygen therapy. presented to [facility] on 7/10/23 with AMS x 3 days.  +h/o nausea and vomitting.      Initial lab was significant for mild leukocytosis of 12.9, anemia  6.5/22, LOLY 71/4.9, with elevated bnp of 1856 and procal 1.32.  urine with many bacteria.  Patient was started onbroad spectrum antibiotics.  Pulmonary was consulted for further inputs.      During my initial evaluation, patient is alert and oriented to self.  No oriented to situation.  Sating well on 6 lpm.  Patient without local deficit.          Hospital/ICU Course:  No notes on file    Interval History: minimal urine output despite 140 mg of lasix.  No acute issue.        Objective:     Vital Signs (Most Recent):  Temp: 97.8 °F (36.6 °C) (07/12/23 0715)  Pulse: 63 (07/12/23 1000)  Resp: (!) 28 (07/12/23 1000)  BP: (!) 100/51 (07/12/23 1000)  SpO2: (!) 91 % (07/12/23 1000) Vital Signs (24h Range):  Temp:  [97.7 °F (36.5 °C)-99 °F (37.2 °C)] 97.8 °F (36.6 °C)  Pulse:  [59-69] 63  Resp:  [19-40] 28  SpO2:  [90 %-99 %] 91 %  BP: ()/(37-71) 100/51     Weight: 57.2 kg (126 lb)  Body mass index is 23.05 kg/m².      Intake/Output Summary (Last 24 hours) at 7/12/2023 1023  Last data filed at 7/12/2023 1000  Gross per 24 hour   Intake 1857.73 ml   Output 150 ml   Net 1707.73 ml        Physical Exam  Vitals reviewed.   Constitutional:       Appearance: She is ill-appearing.   HENT:      Head: Normocephalic and atraumatic.   Eyes:       General: No scleral icterus.  Cardiovascular:      Rate and Rhythm: Normal rate and regular rhythm.   Pulmonary:      Effort: Pulmonary effort is normal.      Comments: On 4L NC  Abdominal:      Palpations: Abdomen is soft.      Tenderness: There is no abdominal tenderness. There is no guarding.   Genitourinary:     Comments: Paniagua catheter in place  Musculoskeletal:      Comments: Right foot with amputated toes   Skin:     Coloration: Skin is pale. Skin is not jaundiced.   Neurological:      Mental Status: She is disoriented.         Review of Systems    Vents:       Lines/Drains/Airways       Peripherally Inserted Central Catheter Line  Duration             PICC Triple Lumen 07/11/23 1811 left basilic <1 day              Drain  Duration                  Urethral Catheter 07/10/23 1706 18 Fr. 1 day              Peripheral Intravenous Line  Duration                  Peripheral IV - Single Lumen 07/10/23 2002 20 G Left;Posterior Hand 1 day                    Significant Labs:    CBC/Anemia Profile:  Recent Labs   Lab 07/10/23  1546 07/11/23  1217 07/11/23  1403 07/12/23  0304   WBC 12.98* 12.35  --  11.88   HGB 7.7* 6.5* 6.6* 8.4*   HCT 24.8* 22.0* 21.4* 26.0*   * 445  --  371   MCV 80* 83  --  82   RDW 17.2* 17.1*  --  16.8*        Chemistries:  Recent Labs   Lab 07/10/23  1543 07/11/23  1217 07/12/23  0304    134* 132*   K 3.8 3.9 3.5   CL 91* 94* 92*   CO2 24 22* 25   BUN 71* 72* 74*   CREATININE 4.9* 4.7* 4.5*   CALCIUM 9.0 7.7* 7.7*   ALBUMIN 2.4*  --   --    PROT 7.9  --   --    BILITOT 0.4  --   --    ALKPHOS 122  --   --    ALT 10  --   --    AST 33  --   --    MG 1.9  --   --    PHOS 7.1*  --   --        Blood Culture:   Recent Labs   Lab 07/10/23  1530 07/10/23  1539   LABBLOO No Growth to date  No Growth to date No Growth to date  No Growth to date     Lactic Acid:   Recent Labs   Lab 07/10/23  1543 07/10/23  2005   LACTATE 3.9* 1.9     Troponin:   Recent Labs   Lab 07/10/23  1549  07/11/23  1403   TROPONINI 0.049* 0.047*     Urine Culture:   Recent Labs   Lab 07/10/23  1706   LABURIN YEAST   > 100,000 cfu/ml  Identification pending  *     Urine Studies:   Recent Labs   Lab 07/10/23  1706   COLORU Orange*   APPEARANCEUA Cloudy*   PHUR 5.0   SPECGRAV 1.015   PROTEINUA 1+*   GLUCUA Negative   KETONESU Negative   BILIRUBINUA Negative   OCCULTUA Negative   NITRITE Negative   UROBILINOGEN Negative   LEUKOCYTESUR 3+*   RBCUA 51*   WBCUA >100*   BACTERIA Many*   SQUAMEPITHEL 3   HYALINECASTS 29*       Significant Imaging:  I have reviewed all pertinent imaging results/findings within the past 24 hours.      ABG  No results for input(s): PH, PO2, PCO2, HCO3, BE in the last 168 hours.  Assessment/Plan:     Neuro  Encephalopathy, metabolic  · Alert and oriented to self but not time, place or situation  · No focal deficit on exam  · Ct head negative for acute process  · Suspect metabolic a/w uremia +/- infection  · Will monitor    Pulmonary  Acute hypoxemic respiratory failure  Patient with Hypoxic Respiratory failure which is Acute.  she is not on home oxygen. Supplemental oxygen was provided and noted-      .   Signs/symptoms of respiratory failure include- lethargy. Contributing diagnoses includes - Pleural effusion Labs and images were reviewed. Patient Has recent ABG, which has been reviewed. Will treat underlying causes and adjust management of respiratory failure as follows-   · Ct abd with small bilateral effusion  · bnp 1800s  · Echo 3/23 with diastolic dysfunction   · Suspect volume overload +/- CAP  · Broad spectrum antibiotics + diuretic  · DNR per Dr. Phillips    Cardiac/Vascular  Diastolic CHF, chronic  Patient is identified as having Diastolic (HFpEF) heart failure that is Acute on chronic. CHF is currently uncontrol due to effusion and worsening oxygen requirement. Latest ECHO performed and demonstrates- Results for orders placed during the hospital encounter of  02/27/23    Echo    Interpretation Summary  · The estimated ejection fraction is 65%.  · The left ventricle is normal in size with normal systolic function.  · Grade II left ventricular diastolic dysfunction.  · Normal right ventricular size with normal right ventricular systolic function.  · Mild left atrial enlargement.  · Mild-to-moderate mitral regurgitation.  · There is pulmonary hypertension.  · The estimated PA systolic pressure is 55 mmHg.  · Intermediate central venous pressure (8 mmHg).  . Continue Furosemide and monitor clinical status closely. Monitor on telemetry. Patient is on CHF pathway.  Monitor strict Is&Os and daily weights.  Place on fluid restriction of 1 L. Cardiology has not been any consulted. Continue to stress to patient importance of self efficacy and  on diet for CHF. Last BNP reviewed- and noted below   Recent Labs   Lab 07/10/23  1543   BNP 1,856*   .    · Repeat echo   · Troponin trending down.  Most likely combination demand ischemia and arf    Renal/  LOLY (acute kidney injury)  · Bun/creat 71/4.9 upon admission  · Ct abd without evidence of hydronephrosis  · Bladder scan without evidence of retention.    · Urine analysis with evidence of infection  · Monitor with diuresis  · Nephrology consulted and poor HD candidate  · Spoken with  and he also declined HD    ID  * Septic shock  · Increasing levophed  · Urine vs pulmonary   · Urine with yeast.  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 Phillips 7/11  ·  7/12/23 spoken with  and DNR reconfirmed.  He also expressed his belief that patient would not want to go thru with HD.  Given Dr. Molina assessment that patient is a poor HD candidate, I support his decision.  If renal function continue to get worse, comfort care would be appropriate.  D/w Dr. Phillips and he is in agreement.           Critical Care Daily Checklist:    A: Awake: RASS Goal/Actual Goal:    Actual:     B: Spontaneous Breathing Trial Performed?     C: SAT & SBT Coordinated?  no                      D: Delirium: CAM-ICU     E: Early Mobility Performed? No   F: Feeding Goal: Goals: 1. Meet % EEN/EPN by RD follow up  Status: Nutrition Goal Status: new   Current Diet Order   Procedures    Diet NPO Except for: Medication, Ice Chips, Sips with Medication     Order Specific Question:   Except for     Answer:   Medication     Order Specific Question:   Except for     Answer:   Ice Chips     Order Specific Question:   Except for     Answer:   Sips with Medication      AS: Analgesia/Sedation none   T: Thromboembolic Prophylaxis scd   H: HOB > 300 Yes   U: Stress Ulcer Prophylaxis (if needed) protonix   G: Glucose Control iss   B: Bowel Function     I: Indwelling Catheter (Lines & Paniagua) Necessity picc   D: De-escalation of Antimicrobials/Pharmacotherapies no    Plan for the day/ETD Supportive care and goc discussion    Code Status:  Family/Goals of Care: DNR       Critical Care Time:  Patient has a condition that poses threat to life and bodily function: Acute Renal Failure and septic shock      45 minutes Critical care was time spent personally by me on the following activities: development of treatment plan with patient or surrogate and bedside caregivers, discussions with consultants, evaluation of patient's response to treatment, examination of patient, ordering and performing treatments and interventions, ordering and review of laboratory  studies, ordering and review of radiographic studies, pulse oximetry, re-evaluation of patient's condition. This critical care time did not overlap with that of any other provider or involve time for any procedures.     Alex Jaramillo MD  Critical Care Medicine  [facility] - [place] - Intensive Care

## 2023-07-12 NOTE — ASSESSMENT & PLAN NOTE
· DNR per Dr. Phillips 7/11  ·  7/12/23 spoken with  and DNR reconfirmed.  He also expressed his belief that patient would not want to go thru with HD.  Given Dr. Molina assessment that patient is a poor HD candidate, I support his decision.  If renal function continue to get worse, comfort care would be appropriate.  D/w Dr. Phillips and he is in agreement.

## 2023-07-12 NOTE — PROGRESS NOTES
"[facility] - [place] - Intensive Care  Palliative Medicine  Progress Note    Patient Name: Nelsy Marino  MRN: [id number]  Admission Date: 7/10/2023  Hospital Length of Stay: 2 days  Code Status: DNR   Attending Provider: Jacobo Phillips MD  Consulting Provider: Geena Ramires NP  Primary Care Physician: Juan F Garay  Principal Problem:Septic shock        Assessment/Plan:   Palliative encounter:  Advance Care Planning   -interval chart review  -discussed in ICU IDT rounds this am  -f/u with patient today who appears much more alert. We discussed how ill she is and the concerns regarding her health, especially her status on yesterday.She asked if she is dying and I let her know that her life expectancy may not be long. She obviously felt this.   -emotional support provided  -will speak with spouse once he arrives to see his thoughts about HD and if not then discuss hospice          Palliative encounter: 7/12/23  Advance Care Planning   -Discussed in ICU IDT rounds this am  -Palliative consult recevied  -discussed with Pulmonary Dr Jaramillo  -chart reviewed in detail  -discussed with bedside nurse Peggy  -Dr Phillips primary met with patient's spouse earlier per his request and explained patient's current clinical condition and the decision to make patient a DNR was made.    -at time of consult patient is in ICU. She is alert and oriented to self and place but is very weak, falling asleep at times. She states she is tired and asked why she feels so bad. I did explain to her in simple terms that her heart and lungs are doing well. She states "oh, not my heart again"   I did let her know she is very sick. She nodded yes as to agree.   I did ask her compassionately about her beliefs about dying and she said she believes in God and that she will go to [facility]. I asked her when she dies does she want to go peacefully and comfortably or does she want us to put her on a life support machine to keep her alive and she said "no " "let me go."   Patient was so weak that I was unable to continue the conversation   -spouse has left for the day        Urinary retention  - rivas placed      Acute hypoxemic respiratory failure       -multifactorial   -Heart failure   -PNA; abx given   -mgmt per primary     LOLY (acute kidney injury)   -mgmt per primary  - minimal UOP  -nephrology consulted     Sepsis with acute organ dysfunction  - U/A dirty, CXR with possible PNA, BCx in process  - mgmt per primary  -on vasopressor     Diastolic CHF, chronic  - mgmt per primary     Type 2 diabetes mellitus with chronic kidney disease, with long-term current use of insulin  -noted     Essential hypertension  -noted           I will follow-up with patient. Please contact us if you have any additional questions.    Subjective:     Chief Complaint:   Chief Complaint   Patient presents with    Altered Mental Status     Family called out for AMS, on EMS arrival pt sats were 60-70, has home oxygen for prn use.  Progressive lethargy and decreased urine.  Recent toe ampuation.  Cbg 85       HPI:   HPI: per chart review 82 year old female with PMHx of HFrEF, COPD, DM, HTN, DVT brought in to the ED by EMS due to change in mental status over the past 3 days. Patient altered and history obtained from ED physician and  at bedside. Patient's  reports that the patient has been confused and has been vomiting and complaining of abdominal pain with poor PO intake and he has noticed decreased urine output as well. Patient has not been ambulating due to a fall and recent amputation of R toes. In the ED, patient noted to be hypoxic requiring 2-4L NC, BNP elevated and CXR with some fluid and possible PNA. Patient started on sepsis protocol due to leukocytosis, low BP, and UTI. Found to have urinary retention and rivas placed. Started on IV abx and admitted to ICU for further management.          Hospital Course:  No notes on file    Interval History: patient more alert and " speaking clearly. She reports she is feeling better  Medications:  Continuous Infusions:   NORepinephrine bitartrate-D5W 0.12 mcg/kg/min (07/12/23 1000)     Scheduled Meds:   albuterol-ipratropium  3 mL Nebulization Q6H    azithromycin  500 mg Intravenous Q24H    fluconazole  100 mg Oral Daily    metOLazone  5 mg Oral Daily    mupirocin   Nasal BID    pantoprazole  40 mg Intravenous BID    piperacillin-tazobactam (Zosyn) IV (PEDS and ADULTS) (extended infusion is not appropriate)  4.5 g Intravenous Q12H    sodium chloride 0.9%  10 mL Intravenous Q6H     PRN Meds:sodium chloride, acetaminophen, dextrose 50%, dextrose 50%, glucagon (human recombinant), glucose, glucose, insulin aspart U-100, melatonin, oxyCODONE-acetaminophen, promethazine (PHENERGAN) IVPB, senna-docusate 8.6-50 mg, sodium chloride 0.9%, Pharmacy to dose Vancomycin consult **AND** vancomycin - pharmacy to dose    Objective:     Vital Signs (Most Recent):  Temp: 97.8 °F (36.6 °C) (07/12/23 0715)  Pulse: 63 (07/12/23 1000)  Resp: (!) 28 (07/12/23 1000)  BP: (!) 100/51 (07/12/23 1000)  SpO2: (!) 91 % (07/12/23 1000) Vital Signs (24h Range):  Temp:  [97.7 °F (36.5 °C)-98.4 °F (36.9 °C)] 97.8 °F (36.6 °C)  Pulse:  [59-69] 63  Resp:  [19-40] 28  SpO2:  [90 %-99 %] 91 %  BP: ()/(37-71) 100/51     Weight: 57.2 kg (126 lb)  Body mass index is 23.05 kg/m².       Physical Exam  Vitals and nursing note reviewed.   Constitutional:       General: She is not in acute distress.     Appearance: She is ill-appearing. She is not toxic-appearing or diaphoretic.   HENT:      Head: Normocephalic and atraumatic.      Right Ear: External ear normal.      Left Ear: External ear normal.      Nose: Nose normal.      Mouth/Throat:      Mouth: Mucous membranes are dry.   Eyes:      General:         Right eye: No discharge.         Left eye: No discharge.   Cardiovascular:      Rate and Rhythm: Normal rate. Rhythm irregular.   Pulmonary:      Comments: Increased work of  breathing, on oxygen  Abdominal:      General: Abdomen is flat.      Palpations: Abdomen is soft.   Genitourinary:     Comments: rivas  Musculoskeletal:         General: Deformity present.      Right lower leg: No edema.      Left lower leg: No edema.      Comments: Amputated toes on right foot   Skin:     General: Skin is warm and dry.   Neurological:      Mental Status: She is alert.      Motor: Weakness present.   Psychiatric:         Mood and Affect: Mood normal.         Behavior: Behavior normal.              Advance Care Planning   Advance Directives:   Do Not Resuscitate Status: Yes    Goals of Care: What is most important right now is to focus on improvement in condition to return home. Accordingly, we have decided that the best plan to meet the patient's goals includes continuing with treatment.       Significant Labs: All pertinent labs within the past 24 hours have been reviewed.  CBC:   Recent Labs   Lab 07/12/23  0304   WBC 11.88   HGB 8.4*   HCT 26.0*   MCV 82        BMP:  Recent Labs   Lab 07/12/23 0304   *   *   K 3.5   CL 92*   CO2 25   BUN 74*   CREATININE 4.5*   CALCIUM 7.7*     LFT:  Lab Results   Component Value Date    AST 33 07/10/2023    ALKPHOS 122 07/10/2023    BILITOT 0.4 07/10/2023     Albumin:   Albumin   Date Value Ref Range Status   07/10/2023 2.4 (L) 3.5 - 5.2 g/dL Final     Protein:   Total Protein   Date Value Ref Range Status   07/10/2023 7.9 6.0 - 8.4 g/dL Final     Lactic acid:   Lab Results   Component Value Date    LACTATE 1.9 07/10/2023    LACTATE 3.9 (HH) 07/10/2023       Significant Imaging: I have reviewed all pertinent imaging results/findings within the past 24 hours.CXR      Geena Ramires NP  Palliative Medicine  [facility] - [place] - Intensive Care    50% of 25 mins spent in chart review, face to face discussion, goals of care, emotional support, symptom assessment, coordination of care with other specialists and d/c planning.

## 2023-07-12 NOTE — PROGRESS NOTES
Pharmacokinetic Assessment Follow Up: IV Vancomycin    Vancomycin serum concentration assessment(s):    The random level was drawn correctly and can be used to guide therapy at this time. The measurement is within the desired definitive target range of 10 to 20 mcg/mL.    Vancomycin Regimen Plan:    Re-dose when the random level is less than 20 mcg/mL, next level to be drawn at 0300 on 7/13/23    Drug levels (last 3 results):  Recent Labs   Lab Result Units 07/11/23  0415 07/12/23  0304   Vancomycin, Random ug/mL 13.7 19.3       Pharmacy will continue to follow and monitor vancomycin.    Please contact pharmacy at extension [phone] for questions regarding this assessment.    Thank you for the consult,   Bishop Green       Patient brief summary:  Nelsy Marino is a 82 y.o. female initiated on antimicrobial therapy with IV Vancomycin for treatment of sepsis    The patient's current regimen is random pulse dosing    Drug Allergies:   Review of patient's allergies indicates:   Allergen Reactions    Motrin [ibuprofen] Itching       Actual Body Weight:   57.2 kg    Renal Function:   Estimated Creatinine Clearance: 7.6 mL/min (A) (based on SCr of 4.5 mg/dL (H)).,     Dialysis Method (if applicable):  N/A    CBC (last 72 hours):  Recent Labs   Lab Result Units 07/10/23  1546 07/11/23  1217 07/11/23  1403 07/12/23  0304   WBC K/uL 12.98* 12.35  --  11.88   Hemoglobin g/dL 7.7* 6.5* 6.6* 8.4*   Hematocrit % 24.8* 22.0* 21.4* 26.0*   Platelets K/uL 477* 445  --  371   Gran % % 80.8* 77.9*  --  86.1*   Lymph % % 11.3* 12.0*  --  6.9*   Mono % % 6.2 7.4  --  4.0   Eosinophil % % 0.9 2.0  --  2.2   Basophil % % 0.1 0.2  --  0.2   Differential Method  Automated Automated  --  Automated       Metabolic Panel (last 72 hours):  Recent Labs   Lab Result Units 07/10/23  1543 07/10/23  1706 07/11/23  1217 07/12/23  0304   Sodium mmol/L 141  --  134* 132*   Potassium mmol/L 3.8  --  3.9 3.5   Chloride mmol/L 91*  --  94* 92*   CO2  mmol/L 24  --  22* 25   Glucose mg/dL 84  --  148* 148*   Glucose, UA   --  Negative  --   --    BUN mg/dL 71*  --  72* 74*   Creatinine mg/dL 4.9*  --  4.7* 4.5*   Albumin g/dL 2.4*  --   --   --    Total Bilirubin mg/dL 0.4  --   --   --    Alkaline Phosphatase U/L 122  --   --   --    AST U/L 33  --   --   --    ALT U/L 10  --   --   --    Magnesium mg/dL 1.9  --   --   --    Phosphorus mg/dL 7.1*  --   --   --        Vancomycin Administrations:  vancomycin given in the last 96 hours                     vancomycin 750 mg in dextrose 5 % (D5W) 250 mL IVPB (Vial-Mate) (mg) 750 mg New Bag 07/11/23 0946    vancomycin (VANCOCIN) 1,000 mg in dextrose 5 % (D5W) 250 mL IVPB (Vial-Mate) (mg) 1,000 mg New Bag 07/10/23 1757                    Microbiologic Results:  Microbiology Results (last 7 days)       Procedure Component Value Units Date/Time    Blood culture x two cultures. Draw prior to antibiotics. [[id number]] Collected: 07/10/23 1530    Order Status: Completed Specimen: Blood from Peripheral, Antecubital, Right Updated: 07/11/23 1703     Blood Culture, Routine No Growth to date      No Growth to date    Narrative:      Aerobic and anaerobic    Blood culture x two cultures. Draw prior to antibiotics. [[id number]] Collected: 07/10/23 1539    Order Status: Completed Specimen: Blood from Peripheral, Wrist, Left Updated: 07/11/23 1703     Blood Culture, Routine No Growth to date      No Growth to date    Narrative:      Aerobic and anaerobic    Urine culture [[id number]] Collected: 07/10/23 1706    Order Status: Completed Specimen: Urine Updated: 07/11/23 0824     Urine Culture, Routine No significant isolate to date    Narrative:      Specimen Source->Urine

## 2023-07-12 NOTE — ASSESSMENT & PLAN NOTE
- U/A dirty, CXR with possible PNA, BCx in process  - continue broad spectrum abx with IV vanc, zosyn, and azithromycin  - s/p 30 cc/kg of IVF, low dose levophed ordered if needed to maintain MAP > 65    Remains on pressor support. UTI with yeast. Started on diflucan. Pulmonary following for possible pneumonia   On Zosyn and zithromax     Will hold off on GLP for now due to current GI issues.

## 2023-07-12 NOTE — PROGRESS NOTES
Awake but confused nad    Denies CNS ENT CP GI  RHEUM OR DERM SX (how accurate her ROS is is unclear)    Family at bedside case discussed with her   Past Medical History:   Diagnosis Date    CHF (congestive heart failure)     Diabetes mellitus     DVT (deep venous thrombosis)     Hypertension     Miscarriage     On home oxygen therapy     PRN     Past Surgical History:   Procedure Laterality Date     femoral vascular surgery       ANGIOGRAPHY OF LOWER EXTREMITY Right 2/23/2023    Procedure: Angiogram Extremity Unilateral;  Surgeon: Danny Dunbar MD;  Location: [facility] OR;  Service: Vascular;  Laterality: Right;  RN PHONE PREOP 2/20/2023----CK CONSENT    ANGIOGRAPHY OF LOWER EXTREMITY Right 4/4/2023    Procedure: Angiogram Extremity Unilateral - possible endovascular intervention;  Surgeon: René Simms MD;  Location: [facility] OR [address] [place] [address];  Service: Vascular;  Laterality: Right;  19.6 mins.  740.81 mGy  73.3399 Gy.cm  58ml dye  local - 1ml    COLONOSCOPY N/A 5/18/2023    Procedure: COLONOSCOPY;  Surgeon: Cuong Aguiar MD;  Location: [facility] ENDO;  Service: Endoscopy;  Laterality: N/A;    CREATION OF FEMORAL-FEMORAL ARTERY BYPASS WITH GRAFT Bilateral 3/2/2023    Procedure: CREATION, BYPASS, ARTERIAL, FEMORAL TO FEMORAL, USING GRAFT, LEFT FEMORAL ENDARTERECTOMY, LEFT ILIAC ARTERY STENT PLACEMENT, RIGHT LOWER EXTREMITY ANGIOGRAM AND RIGHT SFA  ANGIOPLASTY;  Surgeon: Danny Dunbar MD;  Location: [facility] OR [address] [place] [address];  Service: Vascular;  Laterality: Bilateral;   744.47 MGY  175.36 Gycm  Flouro time 20.5 mins      CREATION OF ILIOFEMORAL ARTERY BYPASS Right 3/20/2019    Procedure: CREATION, BYPASS, ARTERIAL, ILIAC TO FEMORAL, RIGHT LOWER EXTREMITY, RIGHT PROFUNDAPLASTY;  Surgeon: Danny Dunbar MD;  Location: [facility] OR;  Service: Vascular;  Laterality: Right;  11:00AM START PER ANNE RICHARDS PREOP 3/13/2019  ACT'S, CELL SAVER, GRAFTS, BOOK WATER---NEED H/P  CONSENT IN AM------HGBA1C    CREATION OF MUSCLE  ROTATIONAL FLAP Left 3/31/2023    Procedure: CREATION, FLAP, MUSCLE ROTATION;  Surgeon: Brandon Smiley MD;  Location: [facility] OR 2ND FLR;  Service: Plastics;  Laterality: Left;    ENDOSCOPY OF PROXIMAL SMALL INTESTINE N/A 5/18/2023    Procedure: ENTEROSCOPY, PROXIMAL;  Surgeon: Cuong Aguiar MD;  Location: [facility] ENDO;  Service: Endoscopy;  Laterality: N/A;    ESOPHAGOGASTRODUODENOSCOPY N/A 3/17/2023    Procedure: EGD (ESOPHAGOGASTRODUODENOSCOPY);  Surgeon: Gee Rodriguez MD;  Location: [facility] ENDO (2ND FLR);  Service: Endoscopy;  Laterality: N/A;    ESOPHAGOGASTRODUODENOSCOPY N/A 4/11/2023    Procedure: EGD (ESOPHAGOGASTRODUODENOSCOPY);  Surgeon: Nazanin Kaiser MD;  Location: [facility] (2ND FLR);  Service: Endoscopy;  Laterality: N/A;    ESOPHAGOGASTRODUODENOSCOPY N/A 4/26/2023    Procedure: EGD (ESOPHAGOGASTRODUODENOSCOPY);  Surgeon: Gee Rodriguez MD;  Location: [facility] (2ND FLR);  Service: Endoscopy;  Laterality: N/A;    ESOPHAGOGASTRODUODENOSCOPY N/A 5/2/2023    Procedure: EGD (ESOPHAGOGASTRODUODENOSCOPY);  Surgeon: Cuong Conteh MD;  Location: [facility] ENDO (2ND FLR);  Service: Endoscopy;  Laterality: N/A;    FOOT AMPUTATION THROUGH METATARSAL Right 3/4/2023    Procedure: AMPUTATION, FOOT, TRANSMETATARSAL;  Surgeon: Benjamin Godfrey DPM;  [facility];  Service: Podiatry;  Laterality: Right;    HYSTERECTOMY      ?unsure cervix present    INCISION AND DRAINAGE Right 4/12/2019    Procedure: Incision and Drainage - R groin washout, possible muscle flap;  Surgeon: Danny Dunbar MD;  Location: [facility] OR 2ND FLR;  Service: Vascular;  Laterality: Right;  groin washout    INCISION AND DRAINAGE OF GROIN Right 5/3/2019    Procedure: Incision and Drainage R groin;  Surgeon: Danny Dunbar MD;  Location: [facility] OR 2ND FLR;  Service: Vascular;  Laterality: Right;    WOUND DEBRIDEMENT Left 3/31/2023    Procedure: DEBRIDEMENT, WOUND;  Surgeon: René Simms MD;  Location: [facility]  OR 2ND FLR;  Service: Vascular;  Laterality: Left;     Review of patient's allergies indicates:   Allergen Reactions    Motrin [ibuprofen] Itching       Current Facility-Administered Medications   Medication    0.9%  NaCl infusion (for blood administration)    acetaminophen tablet 650 mg    albuterol-ipratropium 2.5 mg-0.5 mg/3 mL nebulizer solution 3 mL    azithromycin (ZITHROMAX) 500 mg in dextrose 5 % (D5W) 250 mL IVPB (Vial-Mate)    dextrose 50% injection 12.5 g    dextrose 50% injection 25 g    fluconazole tablet 100 mg    glucagon (human recombinant) injection 1 mg    glucose chewable tablet 16 g    glucose chewable tablet 24 g    insulin aspart U-100 pen 0-5 Units    melatonin tablet 6 mg    metOLazone tablet 5 mg    mupirocin 2 % ointment    NORepinephrine 4 mg in dextrose 5% 250 mL infusion (premix) (titrating)    oxyCODONE-acetaminophen 5-325 mg per tablet 1 tablet    pantoprazole injection 40 mg    piperacillin-tazobactam (ZOSYN) 4.5 g in dextrose 5 % in water (D5W) 5 % 100 mL IVPB (MB+)    promethazine (PHENERGAN) 12.5 mg in dextrose 5 % (D5W) 50 mL IVPB    senna-docusate 8.6-50 mg per tablet 1 tablet    sodium chloride 0.9% flush 10 mL    sodium chloride 0.9% flush 10 mL    vancomycin - pharmacy to dose     Social History     Socioeconomic History    Marital status:    Tobacco Use    Smoking status: Former     Types: Cigarettes     Quit date: 1980     Years since quittin.5    Smokeless tobacco: Never   Substance and Sexual Activity    Alcohol use: No    Drug use: No    Sexual activity: Never     Social Determinants of Health     Financial Resource Strain: Unknown    Difficulty of Paying Living Expenses: Patient refused   Food Insecurity: No Food Insecurity    Worried About Running Out of Food in the Last Year: Never true    Ran Out of Food in the Last Year: Never true   Transportation Needs: No Transportation Needs    Lack of Transportation (Medical): No    Lack of Transportation  (Non-Medical): No   Physical Activity: Inactive    Days of Exercise per Week: 0 days    Minutes of Exercise per Session: 0 min   Stress: Unknown    Feeling of Stress : Patient refused   Social Connections: Unknown    Frequency of Communication with Friends and Family: More than three times a week    Frequency of Social Gatherings with Friends and Family: More than three times a week    Attends Yazidism Services: Patient refused    Active Member of Clubs or Organizations: Patient refused    Attends Club or Organization Meetings: Patient refused    Marital Status:    Housing Stability: Low Risk     Unable to Pay for Housing in the Last Year: No    Number of Places Lived in the Last Year: 1    Unstable Housing in the Last Year: No     Family History   Problem Relation Age of Onset    Diabetes Father     Hypertension Father          LABS    Recent Results (from the past 24 hour(s))   POCT glucose    Collection Time: 07/11/23 11:17 AM   Result Value Ref Range    POCT Glucose 185 (H) 70 - 110 mg/dL   Basic metabolic panel    Collection Time: 07/11/23 12:17 PM   Result Value Ref Range    Sodium 134 (L) 136 - 145 mmol/L    Potassium 3.9 3.5 - 5.1 mmol/L    Chloride 94 (L) 95 - 110 mmol/L    CO2 22 (L) 23 - 29 mmol/L    Glucose 148 (H) 70 - 110 mg/dL    BUN 72 (H) 8 - 23 mg/dL    Creatinine 4.7 (H) 0.5 - 1.4 mg/dL    Calcium 7.7 (L) 8.7 - 10.5 mg/dL    Anion Gap 18 (H) 8 - 16 mmol/L    eGFR 9 (A) >60 mL/min/1.73 m^2   CBC auto differential    Collection Time: 07/11/23 12:17 PM   Result Value Ref Range    WBC 12.35 3.90 - 12.70 K/uL    RBC 2.64 (L) 4.00 - 5.40 M/uL    Hemoglobin 6.5 (L) 12.0 - 16.0 g/dL    Hematocrit 22.0 (L) 37.0 - 48.5 %    MCV 83 82 - 98 fL    MCH 24.6 (L) 27.0 - 31.0 pg    MCHC 29.5 (L) 32.0 - 36.0 g/dL    RDW 17.1 (H) 11.5 - 14.5 %    Platelets 445 150 - 450 K/uL    MPV 10.4 9.2 - 12.9 fL    Immature Granulocytes 0.5 0.0 - 0.5 %    Gran # (ANC) 9.6 (H) 1.8 - 7.7 K/uL    Immature Grans (Abs) 0.06  (H) 0.00 - 0.04 K/uL    Lymph # 1.5 1.0 - 4.8 K/uL    Mono # 0.9 0.3 - 1.0 K/uL    Eos # 0.3 0.0 - 0.5 K/uL    Baso # 0.02 0.00 - 0.20 K/uL    nRBC 0 0 /100 WBC    Gran % 77.9 (H) 38.0 - 73.0 %    Lymph % 12.0 (L) 18.0 - 48.0 %    Mono % 7.4 4.0 - 15.0 %    Eosinophil % 2.0 0.0 - 8.0 %    Basophil % 0.2 0.0 - 1.9 %    Differential Method Automated    Hemoglobin    Collection Time: 07/11/23  2:03 PM   Result Value Ref Range    Hemoglobin 6.6 (L) 12.0 - 16.0 g/dL   Hematocrit    Collection Time: 07/11/23  2:03 PM   Result Value Ref Range    Hematocrit 21.4 (L) 37.0 - 48.5 %   Troponin I    Collection Time: 07/11/23  2:03 PM   Result Value Ref Range    Troponin I 0.047 (H) 0.000 - 0.026 ng/mL   POCT glucose    Collection Time: 07/11/23  5:07 PM   Result Value Ref Range    POCT Glucose 186 (H) 70 - 110 mg/dL   POCT glucose    Collection Time: 07/11/23  9:33 PM   Result Value Ref Range    POCT Glucose 205 (H) 70 - 110 mg/dL   Vancomycin, Random    Collection Time: 07/12/23  3:04 AM   Result Value Ref Range    Vancomycin, Random 19.3 Not established ug/mL   CBC auto differential    Collection Time: 07/12/23  3:04 AM   Result Value Ref Range    WBC 11.88 3.90 - 12.70 K/uL    RBC 3.19 (L) 4.00 - 5.40 M/uL    Hemoglobin 8.4 (L) 12.0 - 16.0 g/dL    Hematocrit 26.0 (L) 37.0 - 48.5 %    MCV 82 82 - 98 fL    MCH 26.3 (L) 27.0 - 31.0 pg    MCHC 32.3 32.0 - 36.0 g/dL    RDW 16.8 (H) 11.5 - 14.5 %    Platelets 371 150 - 450 K/uL    MPV 10.0 9.2 - 12.9 fL    Immature Granulocytes 0.6 (H) 0.0 - 0.5 %    Gran # (ANC) 10.2 (H) 1.8 - 7.7 K/uL    Immature Grans (Abs) 0.07 (H) 0.00 - 0.04 K/uL    Lymph # 0.8 (L) 1.0 - 4.8 K/uL    Mono # 0.5 0.3 - 1.0 K/uL    Eos # 0.3 0.0 - 0.5 K/uL    Baso # 0.02 0.00 - 0.20 K/uL    nRBC 0 0 /100 WBC    Gran % 86.1 (H) 38.0 - 73.0 %    Lymph % 6.9 (L) 18.0 - 48.0 %    Mono % 4.0 4.0 - 15.0 %    Eosinophil % 2.2 0.0 - 8.0 %    Basophil % 0.2 0.0 - 1.9 %    Differential Method Automated    Basic metabolic  panel    Collection Time: 07/12/23  3:04 AM   Result Value Ref Range    Sodium 132 (L) 136 - 145 mmol/L    Potassium 3.5 3.5 - 5.1 mmol/L    Chloride 92 (L) 95 - 110 mmol/L    CO2 25 23 - 29 mmol/L    Glucose 148 (H) 70 - 110 mg/dL    BUN 74 (H) 8 - 23 mg/dL    Creatinine 4.5 (H) 0.5 - 1.4 mg/dL    Calcium 7.7 (L) 8.7 - 10.5 mg/dL    Anion Gap 15 8 - 16 mmol/L    eGFR 9 (A) >60 mL/min/1.73 m^2   POCT glucose    Collection Time: 07/12/23  8:08 AM   Result Value Ref Range    POCT Glucose 161 (H) 70 - 110 mg/dL   ]    I/O last 3 completed shifts:  In: 3428.7 [I.V.:1971.5; Blood:273.8; IV Piggyback:1183.4]  Out: 980 [Urine:980]    Vitals:    07/12/23 0915 07/12/23 0930 07/12/23 0945 07/12/23 1000   BP: (!) 118/55 (!) 99/49 (!) 110/53 (!) 100/51   Pulse: 63 62 62 63   Resp: (!) 32 (!) 25 (!) 22 (!) 28   Temp:       TempSrc:       SpO2: (!) 94% (!) 93% (!) 93% (!) 91%   Weight:       Height:           No Jvd, Thyromegaly or Lymphadenopathy  Lungs: Fairly clear anteriorly and laterally few diffuse post rales   Cor: RRR no G or rubs  Abd: Soft benign no bs non tender   Ext: min edema if any     A)  MOSF with hypotension chf sepsis mario   Oligo-anuric mario   Very active UA  Met acidosis   Anemia with recent low Irons studies   DM  Hyperpo4  Lactic acidosis improving   On pressors to maintain bp  Hx of chf  Hx of dvt  Confused   Yeast uti on fluconazole     P)    Sad to say but there is not much more we could add as she seems to be maxed out in treatment plans     I am afraid that she does not qualify for any dialytic rx as dialysis will not improve any of her underlying conditions. If need be thought a trial of UF could be considered     Renal Diet when able   Adjust all meds to the degree of renal fx  Close follow up I/O and weights  Maintain Hydration     PX poor

## 2023-07-12 NOTE — SUBJECTIVE & OBJECTIVE
Interval History: a bit more interactive today     Review of Systems   Unable to perform ROS: Acuity of condition   Objective:     Vital Signs (Most Recent):  Temp: 98.2 °F (36.8 °C) (07/12/23 0400)  Pulse: 65 (07/12/23 0737)  Resp: 20 (07/12/23 0737)  BP: (!) 118/53 (07/12/23 0630)  SpO2: 99 % (07/12/23 0737) Vital Signs (24h Range):  Temp:  [97.7 °F (36.5 °C)-99 °F (37.2 °C)] 98.2 °F (36.8 °C)  Pulse:  [59-71] 65  Resp:  [19-44] 20  SpO2:  [90 %-99 %] 99 %  BP: ()/(37-71) 118/53     Weight: 57.2 kg (126 lb)  Body mass index is 23.05 kg/m².    Intake/Output Summary (Last 24 hours) at 7/12/2023 0809  Last data filed at 7/12/2023 0541  Gross per 24 hour   Intake 1862.11 ml   Output 150 ml   Net 1712.11 ml         Physical Exam  Vitals and nursing note reviewed.   Constitutional:       Appearance: She is obese. She is ill-appearing.   Pulmonary:      Effort: Pulmonary effort is normal. No respiratory distress.   Skin:     General: Skin is warm and dry.   Neurological:      Mental Status: She is alert.   Psychiatric:         Behavior: Behavior normal.           Significant Labs: All pertinent labs within the past 24 hours have been reviewed.  BMP:   Recent Labs   Lab 07/10/23  1543 07/11/23  1217 07/12/23  0304   GLU 84   < > 148*      < > 132*   K 3.8   < > 3.5   CL 91*   < > 92*   CO2 24   < > 25   BUN 71*   < > 74*   CREATININE 4.9*   < > 4.5*   CALCIUM 9.0   < > 7.7*   MG 1.9  --   --     < > = values in this interval not displayed.     CBC:   Recent Labs   Lab 07/10/23  1546 07/11/23  1217 07/11/23  1403 07/12/23  0304   WBC 12.98* 12.35  --  11.88   HGB 7.7* 6.5* 6.6* 8.4*   HCT 24.8* 22.0* 21.4* 26.0*   * 445  --  371       Significant Imaging: I have reviewed all pertinent imaging results/findings within the past 24 hours.

## 2023-07-12 NOTE — ASSESSMENT & PLAN NOTE
· Bun/creat 71/4.9 upon admission  · Ct abd without evidence of hydronephrosis  · Bladder scan without evidence of retention.    · Urine analysis with evidence of infection  · Monitor with diuresis  · Nephrology consulted and poor HD candidate  · Spoken with  and he also declined HD

## 2023-07-12 NOTE — PROGRESS NOTES
[facility] Medicine  Progress Note    Patient Name: Nelsy Marino  MRN: [id number]  Patient Class: IP- Inpatient   Admission Date: 7/10/2023  Length of Stay: 2 days  Attending Physician: Jacobo Phillips MD  Primary Care Provider: Juan F Garay        Subjective:     Principal Problem:Septic shock        HPI:  82 year old female with PMHx of HFrEF, COPD, DM, HTN, DVT brought in to the ED by EMS due to change in mental status over the past 3 days. Patient altered and history obtained from ED physician and  at bedside. Patient's  reports that the patient has been confused and has been vomiting and complaining of abdominal pain with poor PO intake and he has noticed decreased urine output as well. Patient has not been ambulating due to a fall and recent amputation of R toes. In the ED, patient noted to be hypoxic requiring 2-4L NC, BNP elevated and CXR with some fluid and possible PNA. Patient started on sepsis protocol due to leukocytosis, low BP, and UTI. Found to have urinary retention and rivas placed. Started on IV abx and admitted to ICU for further management.       Overview/Hospital Course:  Patient admitted to the ICU with sepsis from possible pneumonia/UTI requiring pressor support. Pulmonary consulted for hypoxia which was likely due to pleural effusions, lethargy, volume overload and possible pneumonia  blood cultures were NG. Yeast in urine and started on Diflucan      Interval History: a bit more interactive today     Review of Systems   Unable to perform ROS: Acuity of condition   Objective:     Vital Signs (Most Recent):  Temp: 98.2 °F (36.8 °C) (07/12/23 0400)  Pulse: 65 (07/12/23 0737)  Resp: 20 (07/12/23 0737)  BP: (!) 118/53 (07/12/23 0630)  SpO2: 99 % (07/12/23 0737) Vital Signs (24h Range):  Temp:  [97.7 °F (36.5 °C)-99 °F (37.2 °C)] 98.2 °F (36.8 °C)  Pulse:  [59-71] 65  Resp:  [19-44] 20  SpO2:  [90 %-99 %] 99 %  BP: ()/(37-71) 118/53      Weight: 57.2 kg (126 lb)  Body mass index is 23.05 kg/m².    Intake/Output Summary (Last 24 hours) at 7/12/2023 0809  Last data filed at 7/12/2023 0541  Gross per 24 hour   Intake 1862.11 ml   Output 150 ml   Net 1712.11 ml         Physical Exam  Vitals and nursing note reviewed.   Constitutional:       Appearance: She is obese. She is ill-appearing.   Pulmonary:      Effort: Pulmonary effort is normal. No respiratory distress.   Skin:     General: Skin is warm and dry.   Neurological:      Mental Status: She is alert.   Psychiatric:         Behavior: Behavior normal.           Significant Labs: All pertinent labs within the past 24 hours have been reviewed.  BMP:   Recent Labs   Lab 07/10/23  1543 07/11/23  1217 07/12/23  0304   GLU 84   < > 148*      < > 132*   K 3.8   < > 3.5   CL 91*   < > 92*   CO2 24   < > 25   BUN 71*   < > 74*   CREATININE 4.9*   < > 4.5*   CALCIUM 9.0   < > 7.7*   MG 1.9  --   --     < > = values in this interval not displayed.     CBC:   Recent Labs   Lab 07/10/23  1546 07/11/23  1217 07/11/23  1403 07/12/23  0304   WBC 12.98* 12.35  --  11.88   HGB 7.7* 6.5* 6.6* 8.4*   HCT 24.8* 22.0* 21.4* 26.0*   * 445  --  371       Significant Imaging: I have reviewed all pertinent imaging results/findings within the past 24 hours.      Assessment/Plan:      * Septic shock  - U/A dirty, CXR with possible PNA, BCx in process  - continue broad spectrum abx with IV vanc, zosyn, and azithromycin  - s/p 30 cc/kg of IVF, low dose levophed ordered if needed to maintain MAP > 65    Remains on pressor support. UTI with yeast. Started on diflucan. Pulmonary following for possible pneumonia   On Zosyn and zithromax      Acute blood loss anemia  Will heme stools. S/p blood with appropriate rise      DNR (do not resuscitate)  Per my discussion with  on 7/11/23      Acute cystitis  - as above    Diflucan for yeast in urine      Urinary retention  - rivas placed with good UOP  - monitor  output      Acute hypoxemic respiratory failure  - as under heart failure  - s/p IVF per sepsis protocol  - on 2-4L NC, wean as tolerated  - covering with abx for possible PNA  - repeat echo      Multifactorial- as per pulmonary    Moderate malnutrition  Nutrition consulted. Most recent weight and BMI monitored-     Measurements:  Wt Readings from Last 1 Encounters:   07/11/23 57.2 kg (126 lb)   Body mass index is 23.05 kg/m².    Patient has been screened and assessed by RD.    Malnutrition Type:  Context:    Level:      Malnutrition Characteristic Summary:       Interventions/Recommendations (treatment strategy):  1.    Debility  PT/OT when appropriate      LOLY (acute kidney injury)  - sCr up from baseline, pt with urinary retention and UTI  - good UOP after rivas placed, monitor I/O  - repeat BMP in AM      Diastolic CHF, chronic  - elevated BNP and pt requiring 2-4L of oxygen  - s/p IVF in the ED per sepsis protocol  - repeat echo      Type 2 diabetes mellitus with chronic kidney disease, with long-term current use of insulin  -SSI for now, pt with very poor PO intake      Essential hypertension  -holding all BP meds due to sepsis        VTE Risk Mitigation (From admission, onward)         Ordered     IP VTE HIGH RISK PATIENT  Once         07/10/23 1920     Place sequential compression device  Until discontinued         07/10/23 1920     Place GABE hose  Until discontinued         07/10/23 1920     Place sequential compression device  Until discontinued         07/10/23 1920                Discharge Planning   JUAN ALBERTO: 7/15/2023     Code Status: DNR   Is the patient medically ready for discharge?:     Reason for patient still in hospital (select all that apply): Patient unstable  Discharge Plan A: Home Health            Critical care time spent on the evaluation and treatment of severe organ dysfunction, review of pertinent labs and imaging studies, discussions with consulting providers and discussions with  patient/family: 40 minutes.      Jacobo Norris MD  Department of Hospital Medicine   [facility] [place] - Intensive Care

## 2023-07-12 NOTE — ASSESSMENT & PLAN NOTE
Nutrition consulted. Most recent weight and BMI monitored-     Measurements:  Wt Readings from Last 1 Encounters:   07/11/23 57.2 kg (126 lb)   Body mass index is 23.05 kg/m².    Patient has been screened and assessed by RD.    Malnutrition Type:  Context:    Level:      Malnutrition Characteristic Summary:       Interventions/Recommendations (treatment strategy):  1.

## 2023-07-12 NOTE — CARE UPDATE
[facility] [place] Intensive Care  ICU Shift Summary  Date: 7/12/2023      Prehospitalization: Home  Admit Date / LOS : 7/10/2023/ 2 days    Diagnosis: <principal problem not specified>    Consults:        Active: Nephro, Palliative, Pulm CC, and Wound Care       Needed: N/A     Code Status: DNR   Advanced Directive: <no information>    LDA:  Lines/Drains/Airways       Peripherally Inserted Central Catheter Line  Duration             PICC Triple Lumen 07/11/23 1811 left basilic <1 day              Drain  Duration                  Urethral Catheter 07/10/23 1706 18 Fr. 1 day              Peripheral Intravenous Line  Duration                  Peripheral IV - Single Lumen 07/10/23 2002 20 G Left;Posterior Hand 1 day                  Central Lines/Site/Justification:Pressors  Urinary Cath/Order/Justification:Urinary Retention    Vasopressors/Infusions:    NORepinephrine bitartrate-D5W 0.2 mcg/kg/min (07/11/23 2000)    NORepinephrine bitartrate-D5W 0.14 mcg/kg/min (07/12/23 0500)          GOALS: Volume/ Hemodynamic: MAP > 65                     RASS: 0  alert and calm    Pain Management: PO       Pain Controlled: yes     Rhythm: NSR and SB    Respiratory Device: HFNC                      Most Recent SBT/ SAT: N/A       MOVE Screen: FAIL  ICU Liberation: not applicable    VTE Prophylaxis: Pharm  Mobility: Bedrest  Stress Ulcer Prophylaxis: Yes    Isolation: No active isolations    Dietary:   Current Diet Order   Procedures    Diet NPO Except for: Medication, Ice Chips, Sips with Medication     Order Specific Question:   Except for     Answer:   Medication     Order Specific Question:   Except for     Answer:   Ice Chips     Order Specific Question:   Except for     Answer:   Sips with Medication      Tolerance: not applicable  Advancement: no    I & O (24h):    Intake/Output Summary (Last 24 hours) at 7/12/2023 0549  Last data filed at 7/12/2023 0541  Gross per 24 hour   Intake 2111.26 ml   Output 150 ml   Net 1961.26 ml         Restraints: No    Significant Dates:  Post Op Date: N/A  Rescue Date: N/A  Imaging/ Diagnostics: N/A    Noteworthy Labs:  see labs    COVID Test: (--)  CBC/Anemia Labs: Coags:    Recent Labs   Lab 07/11/23  1217 07/11/23  1403 07/12/23  0304   WBC 12.35  --  11.88   HGB 6.5* 6.6* 8.4*   HCT 22.0* 21.4* 26.0*     --  371   MCV 83  --  82   RDW 17.1*  --  16.8*    No results for input(s): PT, INR, APTT in the last 168 hours.     Chemistries:   Recent Labs   Lab 07/10/23  1543 07/11/23  1217 07/12/23  0304    134* 132*   K 3.8 3.9 3.5   CL 91* 94* 92*   CO2 24 22* 25   BUN 71* 72* 74*   CREATININE 4.9* 4.7* 4.5*   CALCIUM 9.0 7.7* 7.7*   PROT 7.9  --   --    BILITOT 0.4  --   --    ALKPHOS 122  --   --    ALT 10  --   --    AST 33  --   --    MG 1.9  --   --    PHOS 7.1*  --   --         Cardiac Enzymes: Ejection Fractions:    Recent Labs     07/10/23  1543 07/11/23  1403   TROPONINI 0.049* 0.047*    EF   Date Value Ref Range Status   03/09/2023 65 % Final        POCT Glucose: HbA1c:    Recent Labs   Lab 07/11/23  1117 07/11/23  1707 07/11/23  2133   POCTGLUCOSE 185* 186* 205*    Hemoglobin A1C   Date Value Ref Range Status   06/20/2023 6.5 (H) 4.0 - 5.6 % Final     Comment:     ADA Screening Guidelines:  5.7-6.4%  Consistent with prediabetes  >or=6.5%  Consistent with diabetes    High levels of fetal hemoglobin interfere with the HbA1C  assay. Heterozygous hemoglobin variants (HbS, HgC, etc)do  not significantly interfere with this assay.   However, presence of multiple variants may affect accuracy.             ICU LOS 1d 8h  Level of Care: Critical Care    Chart Check: 24 HR Done  Shift Summary/Plan for the shift: see care plan note

## 2023-07-12 NOTE — SUBJECTIVE & OBJECTIVE
Interval History: minimal urine output despite 140 mg of lasix.  No acute issue.        Objective:     Vital Signs (Most Recent):  Temp: 97.8 °F (36.6 °C) (07/12/23 0715)  Pulse: 63 (07/12/23 1000)  Resp: (!) 28 (07/12/23 1000)  BP: (!) 100/51 (07/12/23 1000)  SpO2: (!) 91 % (07/12/23 1000) Vital Signs (24h Range):  Temp:  [97.7 °F (36.5 °C)-99 °F (37.2 °C)] 97.8 °F (36.6 °C)  Pulse:  [59-69] 63  Resp:  [19-40] 28  SpO2:  [90 %-99 %] 91 %  BP: ()/(37-71) 100/51     Weight: 57.2 kg (126 lb)  Body mass index is 23.05 kg/m².      Intake/Output Summary (Last 24 hours) at 7/12/2023 1023  Last data filed at 7/12/2023 1000  Gross per 24 hour   Intake 1857.73 ml   Output 150 ml   Net 1707.73 ml        Physical Exam  Vitals reviewed.   Constitutional:       Appearance: She is ill-appearing.   HENT:      Head: Normocephalic and atraumatic.   Eyes:      General: No scleral icterus.  Cardiovascular:      Rate and Rhythm: Normal rate and regular rhythm.   Pulmonary:      Effort: Pulmonary effort is normal.      Comments: On 4L NC  Abdominal:      Palpations: Abdomen is soft.      Tenderness: There is no abdominal tenderness. There is no guarding.   Genitourinary:     Comments: Paniagua catheter in place  Musculoskeletal:      Comments: Right foot with amputated toes   Skin:     Coloration: Skin is pale. Skin is not jaundiced.   Neurological:      Mental Status: She is disoriented.         Review of Systems    Vents:       Lines/Drains/Airways       Peripherally Inserted Central Catheter Line  Duration             PICC Triple Lumen 07/11/23 1811 left basilic <1 day              Drain  Duration                  Urethral Catheter 07/10/23 1706 18 Fr. 1 day              Peripheral Intravenous Line  Duration                  Peripheral IV - Single Lumen 07/10/23 2002 20 G Left;Posterior Hand 1 day                    Significant Labs:    CBC/Anemia Profile:  Recent Labs   Lab 07/10/23  1546 07/11/23  1217 07/11/23  6664  07/12/23  0304   WBC 12.98* 12.35  --  11.88   HGB 7.7* 6.5* 6.6* 8.4*   HCT 24.8* 22.0* 21.4* 26.0*   * 445  --  371   MCV 80* 83  --  82   RDW 17.2* 17.1*  --  16.8*        Chemistries:  Recent Labs   Lab 07/10/23  1543 07/11/23  1217 07/12/23  0304    134* 132*   K 3.8 3.9 3.5   CL 91* 94* 92*   CO2 24 22* 25   BUN 71* 72* 74*   CREATININE 4.9* 4.7* 4.5*   CALCIUM 9.0 7.7* 7.7*   ALBUMIN 2.4*  --   --    PROT 7.9  --   --    BILITOT 0.4  --   --    ALKPHOS 122  --   --    ALT 10  --   --    AST 33  --   --    MG 1.9  --   --    PHOS 7.1*  --   --        Blood Culture:   Recent Labs   Lab 07/10/23  1530 07/10/23  1539   LABBLOO No Growth to date  No Growth to date No Growth to date  No Growth to date     Lactic Acid:   Recent Labs   Lab 07/10/23  1543 07/10/23  2005   LACTATE 3.9* 1.9     Troponin:   Recent Labs   Lab 07/10/23  1543 07/11/23  1403   TROPONINI 0.049* 0.047*     Urine Culture:   Recent Labs   Lab 07/10/23  1706   LABURIN YEAST   > 100,000 cfu/ml  Identification pending  *     Urine Studies:   Recent Labs   Lab 07/10/23  1706   COLORU Orange*   APPEARANCEUA Cloudy*   PHUR 5.0   SPECGRAV 1.015   PROTEINUA 1+*   GLUCUA Negative   KETONESU Negative   BILIRUBINUA Negative   OCCULTUA Negative   NITRITE Negative   UROBILINOGEN Negative   LEUKOCYTESUR 3+*   RBCUA 51*   WBCUA >100*   BACTERIA Many*   SQUAMEPITHEL 3   HYALINECASTS 29*       Significant Imaging:  I have reviewed all pertinent imaging results/findings within the past 24 hours.

## 2023-07-12 NOTE — ASSESSMENT & PLAN NOTE
· Increasing levophed  · Urine vs pulmonary   · Urine with yeast.  Blood culture ngtd  · Unable to get respiratory culture due to mental status  · Vanc/zosyn/azithromycin  · Repeat echo pending

## 2023-07-12 NOTE — HOSPITAL COURSE
Patient admitted to the ICU with shock and suspected sepsis from unknown source. Started vasopressors. Pulmonary consulted for hypoxia which was likely due to pleural effusions, lethargy, volume overload and possible pneumonia. Cultures no growth. Urine culture with yeast only. Weaned off vasopressors. Renal function, mental status improved. Stepped down to floor. She is awake and alert with no complaints. Unclear what exactly caused her shock. She has several wounds- wound care consulted. None appear to have active infection. She is on chronic suppressive fluconazole and keflex. Discussed plan with patient and . They say she has been in and out of the hospital for months. They understand that she has multiple medical problems and will probably be hospitalized again with exacerbation of one or more of her chronic medical problems. They are not currently interested in hospice care. They have DME at home and home health. They are interested in addition of Palliative care visits. CHOCO completed- DNR, no vent, no dialysis, no artificial nutrition. Reviewed medications with patient and her - continue home keflex and fluconazole- being used for chronic suppression per ID note 6/2023.

## 2023-07-12 NOTE — PLAN OF CARE
Pt remains in ICU, drowsy throughout shift. HFNC weaned to 6L. Levophed infusing to maintain MAP >65. H/H improved after 1 unit PRBCs. Urine output remains diminished, no BM this shift.

## 2023-07-12 NOTE — CONSULTS
2020;  Consult was conducted with the patient her medical provider and two family members.  Medical Provider reported that the patient's spouse visited with her earlier today.  He patient presented as being lethargic but willing to communicated and  answered questions logically.   The patient and hr family members requested that I communicate with the patient's  and request him to visit with her.  A telephone call was made to Pastor Kat,[place], [place] at [phone]. No one answered, but I left a voicemail message for Pastor Machado.  Prayers were offered for the patient and her family.  The Spiritual Care Team will continue to provide spiritual support to the patient and her family.        PRAVIN Pal   [phone]

## 2023-07-12 NOTE — SUBJECTIVE & OBJECTIVE
Interval History: patient more alert and speaking clearly. She reports she is feeling better  Medications:  Continuous Infusions:   NORepinephrine bitartrate-D5W 0.12 mcg/kg/min (07/12/23 1000)     Scheduled Meds:   albuterol-ipratropium  3 mL Nebulization Q6H    azithromycin  500 mg Intravenous Q24H    fluconazole  100 mg Oral Daily    metOLazone  5 mg Oral Daily    mupirocin   Nasal BID    pantoprazole  40 mg Intravenous BID    piperacillin-tazobactam (Zosyn) IV (PEDS and ADULTS) (extended infusion is not appropriate)  4.5 g Intravenous Q12H    sodium chloride 0.9%  10 mL Intravenous Q6H     PRN Meds:sodium chloride, acetaminophen, dextrose 50%, dextrose 50%, glucagon (human recombinant), glucose, glucose, insulin aspart U-100, melatonin, oxyCODONE-acetaminophen, promethazine (PHENERGAN) IVPB, senna-docusate 8.6-50 mg, sodium chloride 0.9%, Pharmacy to dose Vancomycin consult **AND** vancomycin - pharmacy to dose    Objective:     Vital Signs (Most Recent):  Temp: 97.8 °F (36.6 °C) (07/12/23 0715)  Pulse: 63 (07/12/23 1000)  Resp: (!) 28 (07/12/23 1000)  BP: (!) 100/51 (07/12/23 1000)  SpO2: (!) 91 % (07/12/23 1000) Vital Signs (24h Range):  Temp:  [97.7 °F (36.5 °C)-98.4 °F (36.9 °C)] 97.8 °F (36.6 °C)  Pulse:  [59-69] 63  Resp:  [19-40] 28  SpO2:  [90 %-99 %] 91 %  BP: ()/(37-71) 100/51     Weight: 57.2 kg (126 lb)  Body mass index is 23.05 kg/m².       Physical Exam  Vitals and nursing note reviewed.   Constitutional:       General: She is not in acute distress.     Appearance: She is ill-appearing. She is not toxic-appearing or diaphoretic.   HENT:      Head: Normocephalic and atraumatic.      Right Ear: External ear normal.      Left Ear: External ear normal.      Nose: Nose normal.      Mouth/Throat:      Mouth: Mucous membranes are dry.   Eyes:      General:         Right eye: No discharge.         Left eye: No discharge.   Cardiovascular:      Rate and Rhythm: Normal rate. Rhythm irregular.    Pulmonary:      Comments: Increased work of breathing, on oxygen  Abdominal:      General: Abdomen is flat.      Palpations: Abdomen is soft.   Genitourinary:     Comments: rivas  Musculoskeletal:         General: Deformity present.      Right lower leg: No edema.      Left lower leg: No edema.      Comments: Amputated toes on right foot   Skin:     General: Skin is warm and dry.   Neurological:      Mental Status: She is alert.      Motor: Weakness present.   Psychiatric:         Mood and Affect: Mood normal.         Behavior: Behavior normal.              Advance Care Planning   Advance Directives:   Do Not Resuscitate Status: Yes    Goals of Care: What is most important right now is to focus on improvement in condition to return home. Accordingly, we have decided that the best plan to meet the patient's goals includes continuing with treatment.       Significant Labs: All pertinent labs within the past 24 hours have been reviewed.  CBC:   Recent Labs   Lab 07/12/23  0304   WBC 11.88   HGB 8.4*   HCT 26.0*   MCV 82        BMP:  Recent Labs   Lab 07/12/23 0304   *   *   K 3.5   CL 92*   CO2 25   BUN 74*   CREATININE 4.5*   CALCIUM 7.7*     LFT:  Lab Results   Component Value Date    AST 33 07/10/2023    ALKPHOS 122 07/10/2023    BILITOT 0.4 07/10/2023     Albumin:   Albumin   Date Value Ref Range Status   07/10/2023 2.4 (L) 3.5 - 5.2 g/dL Final     Protein:   Total Protein   Date Value Ref Range Status   07/10/2023 7.9 6.0 - 8.4 g/dL Final     Lactic acid:   Lab Results   Component Value Date    LACTATE 1.9 07/10/2023    LACTATE 3.9 (HH) 07/10/2023       Significant Imaging: I have reviewed all pertinent imaging results/findings within the past 24 hours.CXR

## 2023-07-12 NOTE — ASSESSMENT & PLAN NOTE
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

## 2023-07-12 NOTE — NURSING
[facility], [facility]  Nurses Note -- 4 Eyes      7/12/2023       Skin assessed on: Q Shift      [x] No Pressure Injuries Present    [x]Prevention Measures Documented    [] Yes LDA  for Pressure Injury Previously documented     [] Yes New Pressure Injury Discovered   [] LDA for New Pressure Injury Added      Attending RN:  SUSIE Montgomery    Second RN:  SUSIE Tracey

## 2023-07-13 LAB
ASCENDING AORTA: 2.58 CM
AV INDEX (PROSTH): 0.71
AV MEAN GRADIENT: 6 MMHG
AV PEAK GRADIENT: 11 MMHG
AV VALVE AREA: 2.39 CM2
AV VELOCITY RATIO: 0.8
BACTERIA UR CULT: ABNORMAL
BASOPHILS # BLD AUTO: 0.01 K/UL (ref 0–0.2)
BASOPHILS NFR BLD: 0.1 % (ref 0–1.9)
BSA FOR ECHO PROCEDURE: 1.58 M2
CV ECHO LV RWT: 0.45 CM
DIFFERENTIAL METHOD: ABNORMAL
DOP CALC AO PEAK VEL: 1.65 M/S
DOP CALC AO VTI: 37.4 CM
DOP CALC LVOT AREA: 3.4 CM2
DOP CALC LVOT DIAMETER: 2.07 CM
DOP CALC LVOT PEAK VEL: 1.32 M/S
DOP CALC LVOT STROKE VOLUME: 89.47 CM3
DOP CALC MV VTI: 46.8 CM
DOP CALCLVOT PEAK VEL VTI: 26.6 CM
E WAVE DECELERATION TIME: 209.28 MSEC
E/A RATIO: 0.78
E/E' RATIO: 10.62 M/S
ECHO LV POSTERIOR WALL: 0.82 CM (ref 0.6–1.1)
EJECTION FRACTION: 75 %
EOSINOPHIL # BLD AUTO: 0.3 K/UL (ref 0–0.5)
EOSINOPHIL NFR BLD: 2.8 % (ref 0–8)
ERYTHROCYTE [DISTWIDTH] IN BLOOD BY AUTOMATED COUNT: 17.1 % (ref 11.5–14.5)
FRACTIONAL SHORTENING: 38 % (ref 28–44)
HCT VFR BLD AUTO: 25.7 % (ref 37–48.5)
HGB BLD-MCNC: 8.2 G/DL (ref 12–16)
IMM GRANULOCYTES # BLD AUTO: 0.06 K/UL (ref 0–0.04)
IMM GRANULOCYTES NFR BLD AUTO: 0.5 % (ref 0–0.5)
INTERVENTRICULAR SEPTUM: 1.19 CM (ref 0.6–1.1)
IVC DIAMETER: 1.5 CM
LA MAJOR: 5.7 CM
LA MINOR: 3.33 CM
LA WIDTH: 3.3 CM
LEFT ATRIUM SIZE: 4.17 CM
LEFT ATRIUM VOLUME INDEX: 31.3 ML/M2
LEFT ATRIUM VOLUME: 49.17 CM3
LEFT INTERNAL DIMENSION IN SYSTOLE: 2.29 CM (ref 2.1–4)
LEFT VENTRICLE DIASTOLIC VOLUME INDEX: 36.42 ML/M2
LEFT VENTRICLE DIASTOLIC VOLUME: 57.18 ML
LEFT VENTRICLE MASS INDEX: 71 G/M2
LEFT VENTRICLE SYSTOLIC VOLUME INDEX: 11.5 ML/M2
LEFT VENTRICLE SYSTOLIC VOLUME: 18 ML
LEFT VENTRICULAR INTERNAL DIMENSION IN DIASTOLE: 3.67 CM (ref 3.5–6)
LEFT VENTRICULAR MASS: 111.94 G
LV LATERAL E/E' RATIO: 9.86 M/S
LV SEPTAL E/E' RATIO: 11.5 M/S
LVOT MG: 3.72 MMHG
LVOT MV: 0.91 CM/S
LYMPHOCYTES # BLD AUTO: 0.7 K/UL (ref 1–4.8)
LYMPHOCYTES NFR BLD: 6.4 % (ref 18–48)
MCH RBC QN AUTO: 26.4 PG (ref 27–31)
MCHC RBC AUTO-ENTMCNC: 31.9 G/DL (ref 32–36)
MCV RBC AUTO: 83 FL (ref 82–98)
MONOCYTES # BLD AUTO: 0.5 K/UL (ref 0.3–1)
MONOCYTES NFR BLD: 4.5 % (ref 4–15)
MV MEAN GRADIENT: 2 MMHG
MV PEAK A VEL: 0.88 M/S
MV PEAK E VEL: 0.69 M/S
MV PEAK GRADIENT: 7 MMHG
MV STENOSIS PRESSURE HALF TIME: 68.7 MS
MV VALVE AREA BY CONTINUITY EQUATION: 1.91 CM2
MV VALVE AREA P 1/2 METHOD: 3.2 CM2
NEUTROPHILS # BLD AUTO: 9.4 K/UL (ref 1.8–7.7)
NEUTROPHILS NFR BLD: 85.7 % (ref 38–73)
NRBC BLD-RTO: 0 /100 WBC
PISA TR MAX VEL: 3.22 M/S
PLATELET # BLD AUTO: 332 K/UL (ref 150–450)
PMV BLD AUTO: 10.3 FL (ref 9.2–12.9)
POCT GLUCOSE: 175 MG/DL (ref 70–110)
POCT GLUCOSE: 181 MG/DL (ref 70–110)
POCT GLUCOSE: 185 MG/DL (ref 70–110)
POCT GLUCOSE: 186 MG/DL (ref 70–110)
PULM VEIN S/D RATIO: 0.79
PV PEAK D VEL: 0.42 M/S
PV PEAK S VEL: 0.33 M/S
PV PEAK VELOCITY: 1.13 CM/S
RA MAJOR: 4.2 CM
RA PRESSURE: 8 MMHG
RA WIDTH: 3.34 CM
RBC # BLD AUTO: 3.11 M/UL (ref 4–5.4)
RIGHT VENTRICULAR END-DIASTOLIC DIMENSION: 2.84 CM
SINUS: 2.52 CM
STJ: 2.26 CM
TDI LATERAL: 0.07 M/S
TDI SEPTAL: 0.06 M/S
TDI: 0.07 M/S
TR MAX PG: 41 MMHG
TRICUSPID ANNULAR PLANE SYSTOLIC EXCURSION: 1.87 CM
TV REST PULMONARY ARTERY PRESSURE: 49 MMHG
VANCOMYCIN SERPL-MCNC: 15.2 UG/ML
WBC # BLD AUTO: 10.99 K/UL (ref 3.9–12.7)

## 2023-07-13 PROCEDURE — 63600175 PHARM REV CODE 636 W HCPCS: Performed by: INTERNAL MEDICINE

## 2023-07-13 PROCEDURE — C9113 INJ PANTOPRAZOLE SODIUM, VIA: HCPCS | Performed by: INTERNAL MEDICINE

## 2023-07-13 PROCEDURE — 85025 COMPLETE CBC W/AUTO DIFF WBC: CPT | Performed by: INTERNAL MEDICINE

## 2023-07-13 PROCEDURE — 25000003 PHARM REV CODE 250: Performed by: STUDENT IN AN ORGANIZED HEALTH CARE EDUCATION/TRAINING PROGRAM

## 2023-07-13 PROCEDURE — 20000000 HC ICU ROOM

## 2023-07-13 PROCEDURE — A4216 STERILE WATER/SALINE, 10 ML: HCPCS | Performed by: INTERNAL MEDICINE

## 2023-07-13 PROCEDURE — 99233 PR SUBSEQUENT HOSPITAL CARE,LEVL III: ICD-10-PCS | Mod: ,,, | Performed by: INTERNAL MEDICINE

## 2023-07-13 PROCEDURE — 99233 SBSQ HOSP IP/OBS HIGH 50: CPT | Mod: ,,, | Performed by: INTERNAL MEDICINE

## 2023-07-13 PROCEDURE — 63600175 PHARM REV CODE 636 W HCPCS: Performed by: STUDENT IN AN ORGANIZED HEALTH CARE EDUCATION/TRAINING PROGRAM

## 2023-07-13 PROCEDURE — 27000221 HC OXYGEN, UP TO 24 HOURS

## 2023-07-13 PROCEDURE — 25000003 PHARM REV CODE 250: Performed by: INTERNAL MEDICINE

## 2023-07-13 PROCEDURE — 80202 ASSAY OF VANCOMYCIN: CPT | Performed by: HOSPITALIST

## 2023-07-13 PROCEDURE — 94640 AIRWAY INHALATION TREATMENT: CPT

## 2023-07-13 PROCEDURE — 63700000 PHARM REV CODE 250 ALT 637 W/O HCPCS: Performed by: INTERNAL MEDICINE

## 2023-07-13 PROCEDURE — 94761 N-INVAS EAR/PLS OXIMETRY MLT: CPT

## 2023-07-13 PROCEDURE — 25000242 PHARM REV CODE 250 ALT 637 W/ HCPCS: Performed by: STUDENT IN AN ORGANIZED HEALTH CARE EDUCATION/TRAINING PROGRAM

## 2023-07-13 RX ORDER — MORPHINE SULFATE 4 MG/ML
4 INJECTION, SOLUTION INTRAMUSCULAR; INTRAVENOUS EVERY 8 HOURS PRN
Status: DISCONTINUED | OUTPATIENT
Start: 2023-07-13 | End: 2023-07-16

## 2023-07-13 RX ADMIN — VANCOMYCIN HYDROCHLORIDE 500 MG: 500 INJECTION, POWDER, LYOPHILIZED, FOR SOLUTION INTRAVENOUS at 04:07

## 2023-07-13 RX ADMIN — NOREPINEPHRINE BITARTRATE 0.12 MCG/KG/MIN: 4 INJECTION, SOLUTION INTRAVENOUS at 11:07

## 2023-07-13 RX ADMIN — MUPIROCIN: 20 OINTMENT TOPICAL at 09:07

## 2023-07-13 RX ADMIN — METOLAZONE 5 MG: 5 TABLET ORAL at 08:07

## 2023-07-13 RX ADMIN — IPRATROPIUM BROMIDE AND ALBUTEROL SULFATE 3 ML: 2.5; .5 SOLUTION RESPIRATORY (INHALATION) at 01:07

## 2023-07-13 RX ADMIN — NOREPINEPHRINE BITARTRATE 0.12 MCG/KG/MIN: 4 INJECTION, SOLUTION INTRAVENOUS at 01:07

## 2023-07-13 RX ADMIN — IPRATROPIUM BROMIDE AND ALBUTEROL SULFATE 3 ML: 2.5; .5 SOLUTION RESPIRATORY (INHALATION) at 08:07

## 2023-07-13 RX ADMIN — Medication 10 ML: at 11:07

## 2023-07-13 RX ADMIN — PANTOPRAZOLE SODIUM 40 MG: 40 INJECTION, POWDER, FOR SOLUTION INTRAVENOUS at 08:07

## 2023-07-13 RX ADMIN — AZITHROMYCIN MONOHYDRATE 500 MG: 500 INJECTION, POWDER, LYOPHILIZED, FOR SOLUTION INTRAVENOUS at 11:07

## 2023-07-13 RX ADMIN — Medication 10 ML: at 12:07

## 2023-07-13 RX ADMIN — Medication 10 ML: at 05:07

## 2023-07-13 RX ADMIN — PIPERACILLIN AND TAZOBACTAM 4.5 G: 4; .5 INJECTION, POWDER, LYOPHILIZED, FOR SOLUTION INTRAVENOUS; PARENTERAL at 05:07

## 2023-07-13 RX ADMIN — IPRATROPIUM BROMIDE AND ALBUTEROL SULFATE 3 ML: 2.5; .5 SOLUTION RESPIRATORY (INHALATION) at 07:07

## 2023-07-13 RX ADMIN — Medication 10 ML: at 06:07

## 2023-07-13 RX ADMIN — NOREPINEPHRINE BITARTRATE 0.12 MCG/KG/MIN: 4 INJECTION, SOLUTION INTRAVENOUS at 09:07

## 2023-07-13 RX ADMIN — PIPERACILLIN AND TAZOBACTAM 4.5 G: 4; .5 INJECTION, POWDER, LYOPHILIZED, FOR SOLUTION INTRAVENOUS; PARENTERAL at 04:07

## 2023-07-13 RX ADMIN — Medication 10 ML: at 01:07

## 2023-07-13 RX ADMIN — PANTOPRAZOLE SODIUM 40 MG: 40 INJECTION, POWDER, FOR SOLUTION INTRAVENOUS at 09:07

## 2023-07-13 RX ADMIN — FLUCONAZOLE 100 MG: 100 TABLET ORAL at 08:07

## 2023-07-13 NOTE — PROGRESS NOTES
[facility] Medicine  Progress Note    Patient Name: Nelsy Marino  MRN: [id number]  Patient Class: IP- Inpatient   Admission Date: 7/10/2023  Length of Stay: 3 days  Attending Physician: Jacobo Phillips MD  Primary Care Provider: Juan F Garay        Subjective:     Principal Problem:Septic shock        HPI:  82 year old female with PMHx of HFrEF, COPD, DM, HTN, DVT brought in to the ED by EMS due to change in mental status over the past 3 days. Patient altered and history obtained from ED physician and  at bedside. Patient's  reports that the patient has been confused and has been vomiting and complaining of abdominal pain with poor PO intake and he has noticed decreased urine output as well. Patient has not been ambulating due to a fall and recent amputation of R toes. In the ED, patient noted to be hypoxic requiring 2-4L NC, BNP elevated and CXR with some fluid and possible PNA. Patient started on sepsis protocol due to leukocytosis, low BP, and UTI. Found to have urinary retention and rivas placed. Started on IV abx and admitted to ICU for further management.       Overview/Hospital Course:  Patient admitted to the ICU with sepsis from possible pneumonia/UTI requiring pressor support. Pulmonary consulted for hypoxia which was likely due to pleural effusions, lethargy, volume overload and possible pneumonia  blood cultures were NG. Yeast in urine and started on Diflucan.  Patient with ESRD but patient/ refuse dialysis. Palliative care consulted.  would like to take patient home with hospice likely on 7/14      Interval History: No new issues     Review of Systems   Unable to perform ROS: Acuity of condition   Objective:     Vital Signs (Most Recent):  Temp: 97.3 °F (36.3 °C) (07/13/23 0715)  Pulse: (!) 58 (07/13/23 0745)  Resp: (!) 26 (07/13/23 0745)  BP: (!) 105/55 (07/13/23 0745)  SpO2: 95 % (07/13/23 0745) Vital Signs (24h Range):  Temp:  [97.2 °F  (36.2 °C)-97.5 °F (36.4 °C)] 97.3 °F (36.3 °C)  Pulse:  [57-82] 58  Resp:  [16-42] 26  SpO2:  [89 %-100 %] 95 %  BP: ()/(47-67) 105/55     Weight: 60.3 kg (132 lb 15 oz)  Body mass index is 24.31 kg/m².    Intake/Output Summary (Last 24 hours) at 7/13/2023 0759  Last data filed at 7/13/2023 0701  Gross per 24 hour   Intake 1250.71 ml   Output 245 ml   Net 1005.71 ml         Physical Exam  Vitals and nursing note reviewed.   Constitutional:       Appearance: She is obese. She is ill-appearing.   Pulmonary:      Effort: Pulmonary effort is normal. No respiratory distress.   Skin:     General: Skin is warm and dry.   Neurological:      Mental Status: She is alert.   Psychiatric:         Behavior: Behavior normal.           Significant Labs: All pertinent labs within the past 24 hours have been reviewed.  BMP:   Recent Labs   Lab 07/12/23  0304   *   *   K 3.5   CL 92*   CO2 25   BUN 74*   CREATININE 4.5*   CALCIUM 7.7*     CBC:   Recent Labs   Lab 07/11/23  1217 07/11/23  1403 07/12/23  0304   WBC 12.35  --  11.88   HGB 6.5* 6.6* 8.4*   HCT 22.0* 21.4* 26.0*     --  371       Significant Imaging: I have reviewed all pertinent imaging results/findings within the past 24 hours.      Assessment/Plan:      * Septic shock  - U/A dirty, CXR with possible PNA, BCx in process  - continue broad spectrum abx with IV vanc, zosyn, and azithromycin  - s/p 30 cc/kg of IVF, low dose levophed ordered if needed to maintain MAP > 65    Remains on pressor support. UTI with yeast. Started on diflucan. Pulmonary following for possible pneumonia   On Zosyn and zithromax      Acute blood loss anemia  Will heme stools. S/p blood with appropriate rise      DNR (do not resuscitate)  Per my discussion with  on 7/11/23      Acute cystitis  - as above    Diflucan for yeast in urine      Urinary retention  - rivas placed with good UOP  - monitor output      Acute hypoxemic respiratory failure  - as under heart  failure  - s/p IVF per sepsis protocol  - on 2-4L NC, wean as tolerated  - covering with abx for possible PNA  - repeat echo      Multifactorial- as per pulmonary    Moderate malnutrition  Nutrition consulted. Most recent weight and BMI monitored-     Measurements:  Wt Readings from Last 1 Encounters:   07/11/23 57.2 kg (126 lb)   Body mass index is 23.05 kg/m².    Patient has been screened and assessed by RD.    Malnutrition Type:  Context:    Level:      Malnutrition Characteristic Summary:       Interventions/Recommendations (treatment strategy):  1.    Debility  PT/OT when appropriate      LOLY (acute kidney injury)  - sCr up from baseline, pt with urinary retention and UTI  - good UOP after rivas placed, monitor I/O  - repeat BMP in AM    Patient refuses dialysis       Diastolic CHF, chronic  - elevated BNP and pt requiring 2-4L of oxygen  - s/p IVF in the ED per sepsis protocol  - repeat echo      Type 2 diabetes mellitus with chronic kidney disease, with long-term current use of insulin  -SSI for now, pt with very poor PO intake      Essential hypertension  -holding all BP meds due to sepsis        VTE Risk Mitigation (From admission, onward)         Ordered     IP VTE HIGH RISK PATIENT  Once         07/10/23 1920     Place sequential compression device  Until discontinued         07/10/23 1920     Place GABE hose  Until discontinued         07/10/23 1920     Place sequential compression device  Until discontinued         07/10/23 1920                Discharge Planning   JUAN ALBERTO: 7/15/2023     Code Status: DNR   Is the patient medically ready for discharge?:     Reason for patient still in hospital (select all that apply): Patient unstable  Discharge Plan A: Home Health            Critical care time spent on the evaluation and treatment of severe organ dysfunction, review of pertinent labs and imaging studies, discussions with consulting providers and discussions with patient/family:  35 minutes.      Jacobo CURRIE  MD Jr  Department of Hospital Medicine   [facility] - Intensive Care

## 2023-07-13 NOTE — ASSESSMENT & PLAN NOTE
· DNR per Dr. Phillips 7/11  ·  7/12/23 spoken with  and DNR reconfirmed.  He also expressed his belief that patient would not want to go thru with HD.  Given Dr. Molina assessment that patient is a poor HD candidate, I support his decision.  If renal function continue to get worse, comfort care would be appropriate.  D/w Dr. Phillips and he is in agreement.    · 7/13/23 Dr. Phillips spoke with  and will plan for home hospice.

## 2023-07-13 NOTE — SUBJECTIVE & OBJECTIVE
Interval History: No new issues     Review of Systems   Unable to perform ROS: Acuity of condition   Objective:     Vital Signs (Most Recent):  Temp: 97.3 °F (36.3 °C) (07/13/23 0715)  Pulse: (!) 58 (07/13/23 0745)  Resp: (!) 26 (07/13/23 0745)  BP: (!) 105/55 (07/13/23 0745)  SpO2: 95 % (07/13/23 0745) Vital Signs (24h Range):  Temp:  [97.2 °F (36.2 °C)-97.5 °F (36.4 °C)] 97.3 °F (36.3 °C)  Pulse:  [57-82] 58  Resp:  [16-42] 26  SpO2:  [89 %-100 %] 95 %  BP: ()/(47-67) 105/55     Weight: 60.3 kg (132 lb 15 oz)  Body mass index is 24.31 kg/m².    Intake/Output Summary (Last 24 hours) at 7/13/2023 0759  Last data filed at 7/13/2023 0701  Gross per 24 hour   Intake 1250.71 ml   Output 245 ml   Net 1005.71 ml         Physical Exam  Vitals and nursing note reviewed.   Constitutional:       Appearance: She is obese. She is ill-appearing.   Pulmonary:      Effort: Pulmonary effort is normal. No respiratory distress.   Skin:     General: Skin is warm and dry.   Neurological:      Mental Status: She is alert.   Psychiatric:         Behavior: Behavior normal.           Significant Labs: All pertinent labs within the past 24 hours have been reviewed.  BMP:   Recent Labs   Lab 07/12/23  0304   *   *   K 3.5   CL 92*   CO2 25   BUN 74*   CREATININE 4.5*   CALCIUM 7.7*     CBC:   Recent Labs   Lab 07/11/23  1217 07/11/23  1403 07/12/23  0304   WBC 12.35  --  11.88   HGB 6.5* 6.6* 8.4*   HCT 22.0* 21.4* 26.0*     --  371       Significant Imaging: I have reviewed all pertinent imaging results/findings within the past 24 hours.

## 2023-07-13 NOTE — NURSING
[facility] [place]  Nurses Note -- 4 Eyes      7/13/2023       Skin assessed on: Q Shift      [] No Pressure Injuries Present    []Prevention Measures Documented    [x] Yes LDA  for Pressure Injury Previously documented     [] Yes New Pressure Injury Discovered   [] LDA for New Pressure Injury Added      Attending RN:  Fredo Saucedo, RN     Second RN:  Kadi Zapata RN

## 2023-07-13 NOTE — ASSESSMENT & PLAN NOTE
Patient with Hypoxic Respiratory failure which is Acute.  she is not on home oxygen. Supplemental oxygen was provided and noted-      .   Signs/symptoms of respiratory failure include- lethargy. Contributing diagnoses includes - Pleural effusion Labs and images were reviewed. Patient Has recent ABG, which has been reviewed. Will treat underlying causes and adjust management of respiratory failure as follows-   · Ct abd with small bilateral effusion  · bnp 1800s  · Echo 3/23 with diastolic dysfunction   · Suspect volume overload +/- CAP  · Broad spectrum antibiotics + diuretic  · DNR

## 2023-07-13 NOTE — SUBJECTIVE & OBJECTIVE
Interval History: minimal urine output.      Objective:     Vital Signs (Most Recent):  Temp: 97.1 °F (36.2 °C) (07/13/23 1115)  Pulse: (!) 59 (07/13/23 1200)  Resp: (!) 21 (07/13/23 1200)  BP: (!) 104/53 (07/13/23 1200)  SpO2: 96 % (07/13/23 1200) Vital Signs (24h Range):  Temp:  [97.1 °F (36.2 °C)-97.5 °F (36.4 °C)] 97.1 °F (36.2 °C)  Pulse:  [57-82] 59  Resp:  [16-36] 21  SpO2:  [89 %-100 %] 96 %  BP: ()/(47-67) 104/53     Weight: 60.3 kg (132 lb 15 oz)  Body mass index is 24.31 kg/m².      Intake/Output Summary (Last 24 hours) at 7/13/2023 1256  Last data filed at 7/13/2023 1200  Gross per 24 hour   Intake 1212.46 ml   Output 295 ml   Net 917.46 ml          Physical Exam  Vitals reviewed.   Constitutional:       Appearance: She is ill-appearing.   HENT:      Head: Normocephalic and atraumatic.   Eyes:      General: No scleral icterus.  Cardiovascular:      Rate and Rhythm: Normal rate and regular rhythm.   Pulmonary:      Effort: Pulmonary effort is normal.      Comments: On 4L NC  Abdominal:      Palpations: Abdomen is soft.      Tenderness: There is no abdominal tenderness. There is no guarding.   Genitourinary:     Comments: Paniagua catheter in place  Musculoskeletal:      Comments: Right foot with amputated toes   Skin:     Coloration: Skin is pale. Skin is not jaundiced.   Neurological:      Mental Status: She is disoriented.         Review of Systems    Vents:       Lines/Drains/Airways       Peripherally Inserted Central Catheter Line  Duration             PICC Triple Lumen 07/11/23 1811 left basilic 1 day              Drain  Duration                  Urethral Catheter 07/10/23 1706 18 Fr. 2 days              Peripheral Intravenous Line  Duration                  Peripheral IV - Single Lumen 07/10/23 2002 20 G Left;Posterior Hand 2 days                    Significant Labs:    CBC/Anemia Profile:  Recent Labs   Lab 07/11/23  1403 07/12/23  0304 07/13/23  0950   WBC  --  11.88 10.99   HGB 6.6* 8.4* 8.2*    HCT 21.4* 26.0* 25.7*   PLT  --  371 332   MCV  --  82 83   RDW  --  16.8* 17.1*          Chemistries:  Recent Labs   Lab 07/12/23  0304   *   K 3.5   CL 92*   CO2 25   BUN 74*   CREATININE 4.5*   CALCIUM 7.7*         Blood Culture:   No results for input(s): LABBLOO in the last 48 hours.    Lactic Acid:   No results for input(s): LACTATE in the last 48 hours.    Troponin:   Recent Labs   Lab 07/11/23  1403   TROPONINI 0.047*       Urine Culture:   No results for input(s): LABURIN in the last 48 hours.    Urine Studies:   No results for input(s): COLORU, APPEARANCEUA, PHUR, SPECGRAV, PROTEINUA, GLUCUA, KETONESU, BILIRUBINUA, OCCULTUA, NITRITE, UROBILINOGEN, LEUKOCYTESUR, RBCUA, WBCUA, BACTERIA, SQUAMEPITHEL, HYALINECASTS in the last 48 hours.    Invalid input(s): WRIGHTSUR    Echo 7/12/23  The left ventricle is normal in size with concentric hypertrophy and hyperdynamic systolic function.  The estimated ejection fraction is 75%.  Normal right ventricular size with normal right ventricular systolic function.  Moderate left atrial enlargement.  Moderate mitral regurgitation.  Moderate to severe tricuspid regurgitation.  Intermediate central venous pressure (8 mmHg).  The estimated PA systolic pressure is 49 mmHg.  There is pulmonary hypertension.         Significant Imaging:  I have reviewed all pertinent imaging results/findings within the past 24 hours.

## 2023-07-13 NOTE — PLAN OF CARE
CM called spouse, Dimas Marino at [phone], no answer, detailed message left requesting call back.

## 2023-07-13 NOTE — PLAN OF CARE
Pt aaox1 to self. Pt on tele with nsr noted. Resp even and unlabored on o2@4l per nc with no distress noted. norepinephrine at 0.12mcg/kg/min infusing to LETTY TLC with no redness edema or pain noted. Pt also has #20 left hand piv flushing without redness edema or pain noted. Pt has rivas patent and draining dark yellow urine to gravity. Pt has left groin incision poa, left knee scab poa, right toes amputated inscion healed poa, left heal unstageable pressure ulcer poa and stage 2 sacral ulcer poa noted. Left groin, left knee, Left heal and sacral area with dressings cd&I. Pt stated pain to left leg x one over night. Pt medicated as ordered with relief noted.  at bedside. Bed low position with sr up and call light in reach.

## 2023-07-13 NOTE — NURSING
[facility], [facility]  Nurses Note -- 4 Eyes      7/12/2023       Skin assessed on: Admit      [] No Pressure Injuries Present    []Prevention Measures Documented    [x] Yes LDA  for Pressure Injury Previously documented see nursing note for wounds poa     [] Yes New Pressure Injury Discovered   [] LDA for New Pressure Injury Added      Attending RN:  Kadi Zapata, SUSIE     Second RN:  Michelle RICHARDS

## 2023-07-13 NOTE — PROGRESS NOTES
[facility] - [place], [place] Intensive Care  Pulmonology  Progress Note    Patient Name: Nelsy Marino  MRN: [id number]  Admission Date: 7/10/2023  Hospital Length of Stay: 3 days  Code Status: DNR  Attending Provider: Jacobo Phillips MD  Primary Care Provider: Juan F Garay   Principal Problem: Septic shock    Subjective:     Interval History: minimal urine output.      Objective:     Vital Signs (Most Recent):  Temp: 97.1 °F (36.2 °C) (07/13/23 1115)  Pulse: (!) 59 (07/13/23 1200)  Resp: (!) 21 (07/13/23 1200)  BP: (!) 104/53 (07/13/23 1200)  SpO2: 96 % (07/13/23 1200) Vital Signs (24h Range):  Temp:  [97.1 °F (36.2 °C)-97.5 °F (36.4 °C)] 97.1 °F (36.2 °C)  Pulse:  [57-82] 59  Resp:  [16-36] 21  SpO2:  [89 %-100 %] 96 %  BP: ()/(47-67) 104/53     Weight: 60.3 kg (132 lb 15 oz)  Body mass index is 24.31 kg/m².      Intake/Output Summary (Last 24 hours) at 7/13/2023 1256  Last data filed at 7/13/2023 1200  Gross per 24 hour   Intake 1212.46 ml   Output 295 ml   Net 917.46 ml          Physical Exam  Vitals reviewed.   Constitutional:       Appearance: She is ill-appearing.   HENT:      Head: Normocephalic and atraumatic.   Eyes:      General: No scleral icterus.  Cardiovascular:      Rate and Rhythm: Normal rate and regular rhythm.   Pulmonary:      Effort: Pulmonary effort is normal.      Comments: On 4L NC  Abdominal:      Palpations: Abdomen is soft.      Tenderness: There is no abdominal tenderness. There is no guarding.   Genitourinary:     Comments: P[place] catheter in place  Musculoskeletal:      Comments: Right foot with amputated toes   Skin:     Coloration: Skin is pale. Skin is not jaundiced.   Neurological:      Mental Status: She is disoriented.         Review of Systems    Vents:       Lines/Drains/Airways       Peripherally Inserted Central Catheter Line  Duration             PICC Triple Lumen 07/11/23 1811 left basilic 1 day              Drain  Duration                  Urethral Catheter 07/10/23 1706 18  Fr. 2 days              Peripheral Intravenous Line  Duration                  Peripheral IV - Single Lumen 07/10/23 2002 20 G Left;Posterior Hand 2 days                    Significant Labs:    CBC/Anemia Profile:  Recent Labs   Lab 07/11/23  1403 07/12/23  0304 07/13/23  0950   WBC  --  11.88 10.99   HGB 6.6* 8.4* 8.2*   HCT 21.4* 26.0* 25.7*   PLT  --  371 332   MCV  --  82 83   RDW  --  16.8* 17.1*          Chemistries:  Recent Labs   Lab 07/12/23  0304   *   K 3.5   CL 92*   CO2 25   BUN 74*   CREATININE 4.5*   CALCIUM 7.7*         Blood Culture:   No results for input(s): LABBLOO in the last 48 hours.    Lactic Acid:   No results for input(s): LACTATE in the last 48 hours.    Troponin:   Recent Labs   Lab 07/11/23  1403   TROPONINI 0.047*       Urine Culture:   No results for input(s): LABURIN in the last 48 hours.    Urine Studies:   No results for input(s): COLORU, APPEARANCEUA, PHUR, SPECGRAV, PROTEINUA, GLUCUA, KETONESU, BILIRUBINUA, OCCULTUA, NITRITE, UROBILINOGEN, LEUKOCYTESUR, RBCUA, WBCUA, BACTERIA, SQUAMEPITHEL, HYALINECASTS in the last 48 hours.    Invalid input(s): WRIGHTSUR    Echo 7/12/23   The left ventricle is normal in size with concentric hypertrophy and hyperdynamic systolic function.   The estimated ejection fraction is 75%.   Normal right ventricular size with normal right ventricular systolic function.   Moderate left atrial enlargement.   Moderate mitral regurgitation.   Moderate to severe tricuspid regurgitation.   Intermediate central venous pressure (8 mmHg).   The estimated PA systolic pressure is 49 mmHg.   There is pulmonary hypertension.         Significant Imaging:  I have reviewed all pertinent imaging results/findings within the past 24 hours.      ABG  No results for input(s): PH, PO2, PCO2, HCO3, BE in the last 168 hours.  Assessment/Plan:     Neuro  Encephalopathy, metabolic  · Alert and oriented to self but not time, place or situation  · No focal deficit on  exam  · Ct head negative for acute process  · Suspect metabolic a/w uremia +/- infection      Pulmonary  Acute hypoxemic respiratory failure  Patient with Hypoxic Respiratory failure which is Acute.  she is not on home oxygen. Supplemental oxygen was provided and noted-      .   Signs/symptoms of respiratory failure include- lethargy. Contributing diagnoses includes - Pleural effusion Labs and images were reviewed. Patient Has recent ABG, which has been reviewed. Will treat underlying causes and adjust management of respiratory failure as follows-   · Ct abd with small bilateral effusion  · bnp 1800s  · Echo 3/23 with diastolic dysfunction   · Suspect volume overload +/- CAP  · Broad spectrum antibiotics + diuretic  · DNR      Cardiac/Vascular  Diastolic CHF, chronic  Patient is identified as having Diastolic (HFpEF) heart failure that is Acute on chronic. CHF is currently uncontrol due to effusion and worsening oxygen requirement. Latest ECHO performed and demonstrates- Results for orders placed during the hospital encounter of 02/27/23    Echo    Interpretation Summary  · The estimated ejection fraction is 65%.  · The left ventricle is normal in size with normal systolic function.  · Grade II left ventricular diastolic dysfunction.  · Normal right ventricular size with normal right ventricular systolic function.  · Mild left atrial enlargement.  · Mild-to-moderate mitral regurgitation.  · There is pulmonary hypertension.  · The estimated PA systolic pressure is 55 mmHg.  · Intermediate central venous pressure (8 mmHg).  . Continue Furosemide and monitor clinical status closely. Monitor on telemetry. Patient is on CHF pathway.  Monitor strict Is&Os and daily weights.  Place on fluid restriction of 1 L. Cardiology has not been any consulted. Continue to stress to patient importance of self efficacy and  on diet for CHF. Last BNP reviewed- and noted below   Recent Labs   Lab 07/10/23  1543   BNP 1,856*    .    · Repeat echo   · Troponin trending down.  Most likely combination demand ischemia and arf    Renal/  LOLY (acute kidney injury)  · Bun/creat 71/4.9 upon admission  · Ct abd without evidence of hydronephrosis  · Bladder scan without evidence of retention.    · Urine analysis with evidence of infection  · Monitor with diuresis  · Nephrology consulted and poor HD candidate  · Spoken with  and he also declined HD    Oncology  Acute blood loss anemia  ·  H/o Admitted to the hospital May 2023 with hypokalemia and found to have GIB  · EGD/colonoscopy on 5/18/23 showed AVM and non-bleeding gastric ulcer on endoscopy and hemorrage in the ascending colon on colonoscopy  · No overt evidence of gib or retroperitoneal bleed per ct abd  · H/h responded appropriately with transfusion    Palliative Care  Palliative care encounter  · DNR per Dr. Phillips 7/11  ·  7/12/23 spoken with  and DNR reconfirmed.  He also expressed his belief that patient would not want to go thru with HD.  Given Dr. Molina assessment that patient is a poor HD candidate, I support his decision.  If renal function continue to get worse, comfort care would be appropriate.  D/w Dr. Phillips and he is in agreement.    · 7/13/23 Dr. Phillips spoke with  and will plan for home hospice.               Alex Jaramillo MD  Pulmonology  [facility] - Intensive Care      Will be available upon request.

## 2023-07-13 NOTE — ASSESSMENT & PLAN NOTE
· Alert and oriented to self but not time, place or situation  · No focal deficit on exam  · Ct head negative for acute process  · Suspect metabolic a/w uremia +/- infection

## 2023-07-13 NOTE — NURSING
[facility], [facility]  Nurses Note -- 4 Eyes      7/12/2023       Skin assessed on: Q Shift      [] No Pressure Injuries Present    [x]Prevention Measures Documented    [x] Yes LDA  for Pressure Injury Previously documented     [] Yes New Pressure Injury Discovered   [] LDA for New Pressure Injury Added      Attending RN:  Kadi Zapata, SUSIE     Second RN:  Peggy RICHARDS

## 2023-07-13 NOTE — PROGRESS NOTES
Pharmacokinetic Assessment Follow Up: IV Vancomycin    Vancomycin serum concentration assessment(s):    The random level was drawn correctly and can be used to guide therapy at this time. The measurement is within the desired definitive target range of 10 to 20 mcg/mL.    Vancomycin Regimen Plan:    Vancomycin 500mg dose scheduled.    Re-dose when the random level is less than 20 mcg/mL, next level to be drawn at 0300 on 7/14/23    Drug levels (last 3 results):  Recent Labs   Lab Result Units 07/11/23  0415 07/12/23  0304 07/13/23  0324   Vancomycin, Random ug/mL 13.7 19.3 15.2       Pharmacy will continue to follow and monitor vancomycin.    Please contact pharmacy at extension [phone] for questions regarding this assessment.    Thank you for the consult,   Bishop Green       Patient brief summary:  Nelsy Marino is a 82 y.o. female initiated on antimicrobial therapy with IV Vancomycin for treatment of sepsis    The patient's current regimen is random pulse dosing    Drug Allergies:   Review of patient's allergies indicates:   Allergen Reactions    Motrin [ibuprofen] Itching       Actual Body Weight:   57.2 kg    Renal Function:   Estimated Creatinine Clearance: 7.6 mL/min (A) (based on SCr of 4.5 mg/dL (H)).,     Dialysis Method (if applicable):  N/A    CBC (last 72 hours):  Recent Labs   Lab Result Units 07/10/23  1546 07/11/23  1217 07/11/23  1403 07/12/23  0304   WBC K/uL 12.98* 12.35  --  11.88   Hemoglobin g/dL 7.7* 6.5* 6.6* 8.4*   Hematocrit % 24.8* 22.0* 21.4* 26.0*   Platelets K/uL 477* 445  --  371   Gran % % 80.8* 77.9*  --  86.1*   Lymph % % 11.3* 12.0*  --  6.9*   Mono % % 6.2 7.4  --  4.0   Eosinophil % % 0.9 2.0  --  2.2   Basophil % % 0.1 0.2  --  0.2   Differential Method  Automated Automated  --  Automated       Metabolic Panel (last 72 hours):  Recent Labs   Lab Result Units 07/10/23  1543 07/10/23  1706 07/11/23  1217 07/12/23  0304   Sodium mmol/L 141  --  134* 132*   Potassium mmol/L  3.8  --  3.9 3.5   Chloride mmol/L 91*  --  94* 92*   CO2 mmol/L 24  --  22* 25   Glucose mg/dL 84  --  148* 148*   Glucose, UA   --  Negative  --   --    BUN mg/dL 71*  --  72* 74*   Creatinine mg/dL 4.9*  --  4.7* 4.5*   Albumin g/dL 2.4*  --   --   --    Total Bilirubin mg/dL 0.4  --   --   --    Alkaline Phosphatase U/L 122  --   --   --    AST U/L 33  --   --   --    ALT U/L 10  --   --   --    Magnesium mg/dL 1.9  --   --   --    Phosphorus mg/dL 7.1*  --   --   --        Vancomycin Administrations:  vancomycin given in the last 96 hours                     vancomycin 750 mg in dextrose 5 % (D5W) 250 mL IVPB (Vial-Mate) (mg) 750 mg New Bag 07/11/23 0946    vancomycin (VANCOCIN) 1,000 mg in dextrose 5 % (D5W) 250 mL IVPB (Vial-Mate) (mg) 1,000 mg New Bag 07/10/23 1757                    Microbiologic Results:  Microbiology Results (last 7 days)       Procedure Component Value Units Date/Time    Blood culture x two cultures. Draw prior to antibiotics. [[id number]] Collected: 07/10/23 1530    Order Status: Completed Specimen: Blood from Peripheral, Antecubital, Right Updated: 07/12/23 1703     Blood Culture, Routine No Growth to date      No Growth to date      No Growth to date    Narrative:      Aerobic and anaerobic    Blood culture x two cultures. Draw prior to antibiotics. [[id number]] Collected: 07/10/23 1539    Order Status: Completed Specimen: Blood from Peripheral, Wrist, Left Updated: 07/12/23 1703     Blood Culture, Routine No Growth to date      No Growth to date      No Growth to date    Narrative:      Aerobic and anaerobic    Urine culture [[id number]]  (Abnormal) Collected: 07/10/23 1706    Order Status: Completed Specimen: Urine Updated: 07/12/23 0632     Urine Culture, Routine YEAST   > 100,000 cfu/ml  Identification pending      Narrative:      Specimen Source->Urine

## 2023-07-13 NOTE — ASSESSMENT & PLAN NOTE
- sCr up from baseline, pt with urinary retention and UTI  - good UOP after rivas placed, monitor I/O  - repeat BMP in AM    Patient refuses dialysis

## 2023-07-13 NOTE — PLAN OF CARE
Consult received for home hospice.  CM called spouseDimas at [phone], no answer, detailed message left requesting call back.       07/13/23 1119   Discharge Reassessment   Assessment Type Discharge Planning Reassessment   Did the patient's condition or plan change since previous assessment? Yes   Discharge Plan discussed with:   (sister and niece at bedside)   Communicated JUAN ALBERTO with patient/caregiver Yes   Discharge Plan A Hospice/home   Discharge Plan B   (n/a)   DME Needed Upon Discharge  none   Transition of Care Barriers None   Why the patient remains in the hospital Requires continued medical care

## 2023-07-13 NOTE — ASSESSMENT & PLAN NOTE
- U/A dirty, CXR with possible PNA, BCx in process  - continue broad spectrum abx with IV vanc, zosyn, and azithromycin  - s/p 30 cc/kg of IVF, low dose levophed ordered if needed to maintain MAP > 65    Remains on pressor support. UTI with yeast. Started on diflucan. Pulmonary following for possible pneumonia   On Zosyn and zithromax

## 2023-07-14 LAB
ANION GAP SERPL CALC-SCNC: 16 MMOL/L (ref 8–16)
BACTERIA BLD CULT: NORMAL
BACTERIA BLD CULT: NORMAL
BUN SERPL-MCNC: 78 MG/DL (ref 8–23)
CALCIUM SERPL-MCNC: 7.6 MG/DL (ref 8.7–10.5)
CHLORIDE SERPL-SCNC: 89 MMOL/L (ref 95–110)
CO2 SERPL-SCNC: 25 MMOL/L (ref 23–29)
CREAT SERPL-MCNC: 4.1 MG/DL (ref 0.5–1.4)
EST. GFR  (NO RACE VARIABLE): 10 ML/MIN/1.73 M^2
GLUCOSE SERPL-MCNC: 176 MG/DL (ref 70–110)
POCT GLUCOSE: 170 MG/DL (ref 70–110)
POCT GLUCOSE: 196 MG/DL (ref 70–110)
POCT GLUCOSE: 198 MG/DL (ref 70–110)
POCT GLUCOSE: 203 MG/DL (ref 70–110)
POTASSIUM SERPL-SCNC: 3.1 MMOL/L (ref 3.5–5.1)
SODIUM SERPL-SCNC: 130 MMOL/L (ref 136–145)
VANCOMYCIN SERPL-MCNC: 18.8 UG/ML

## 2023-07-14 PROCEDURE — C9113 INJ PANTOPRAZOLE SODIUM, VIA: HCPCS | Performed by: INTERNAL MEDICINE

## 2023-07-14 PROCEDURE — 94761 N-INVAS EAR/PLS OXIMETRY MLT: CPT

## 2023-07-14 PROCEDURE — A4216 STERILE WATER/SALINE, 10 ML: HCPCS | Performed by: INTERNAL MEDICINE

## 2023-07-14 PROCEDURE — 63600175 PHARM REV CODE 636 W HCPCS: Performed by: INTERNAL MEDICINE

## 2023-07-14 PROCEDURE — 80202 ASSAY OF VANCOMYCIN: CPT | Performed by: HOSPITALIST

## 2023-07-14 PROCEDURE — 94640 AIRWAY INHALATION TREATMENT: CPT

## 2023-07-14 PROCEDURE — 20000000 HC ICU ROOM

## 2023-07-14 PROCEDURE — 97535 SELF CARE MNGMENT TRAINING: CPT

## 2023-07-14 PROCEDURE — 25000003 PHARM REV CODE 250: Performed by: STUDENT IN AN ORGANIZED HEALTH CARE EDUCATION/TRAINING PROGRAM

## 2023-07-14 PROCEDURE — 25000003 PHARM REV CODE 250: Performed by: INTERNAL MEDICINE

## 2023-07-14 PROCEDURE — 63700000 PHARM REV CODE 250 ALT 637 W/O HCPCS: Performed by: INTERNAL MEDICINE

## 2023-07-14 PROCEDURE — 80048 BASIC METABOLIC PNL TOTAL CA: CPT | Performed by: INTERNAL MEDICINE

## 2023-07-14 PROCEDURE — 63600175 PHARM REV CODE 636 W HCPCS: Performed by: STUDENT IN AN ORGANIZED HEALTH CARE EDUCATION/TRAINING PROGRAM

## 2023-07-14 PROCEDURE — 92610 EVALUATE SWALLOWING FUNCTION: CPT

## 2023-07-14 PROCEDURE — 25000242 PHARM REV CODE 250 ALT 637 W/ HCPCS: Performed by: STUDENT IN AN ORGANIZED HEALTH CARE EDUCATION/TRAINING PROGRAM

## 2023-07-14 PROCEDURE — 27100171 HC OXYGEN HIGH FLOW UP TO 24 HOURS

## 2023-07-14 RX ORDER — POTASSIUM CHLORIDE 20 MEQ/1
60 TABLET, EXTENDED RELEASE ORAL ONCE
Status: DISCONTINUED | OUTPATIENT
Start: 2023-07-14 | End: 2023-07-18

## 2023-07-14 RX ADMIN — Medication 10 ML: at 06:07

## 2023-07-14 RX ADMIN — MUPIROCIN: 20 OINTMENT TOPICAL at 09:07

## 2023-07-14 RX ADMIN — METOLAZONE 5 MG: 5 TABLET ORAL at 11:07

## 2023-07-14 RX ADMIN — Medication 10 ML: at 05:07

## 2023-07-14 RX ADMIN — PIPERACILLIN AND TAZOBACTAM 4.5 G: 4; .5 INJECTION, POWDER, LYOPHILIZED, FOR SOLUTION INTRAVENOUS; PARENTERAL at 04:07

## 2023-07-14 RX ADMIN — AZITHROMYCIN MONOHYDRATE 500 MG: 500 INJECTION, POWDER, LYOPHILIZED, FOR SOLUTION INTRAVENOUS at 10:07

## 2023-07-14 RX ADMIN — PIPERACILLIN AND TAZOBACTAM 4.5 G: 4; .5 INJECTION, POWDER, LYOPHILIZED, FOR SOLUTION INTRAVENOUS; PARENTERAL at 05:07

## 2023-07-14 RX ADMIN — FLUCONAZOLE 100 MG: 100 TABLET ORAL at 11:07

## 2023-07-14 RX ADMIN — Medication 10 ML: at 12:07

## 2023-07-14 RX ADMIN — NOREPINEPHRINE BITARTRATE 0.12 MCG/KG/MIN: 4 INJECTION, SOLUTION INTRAVENOUS at 07:07

## 2023-07-14 RX ADMIN — PANTOPRAZOLE SODIUM 40 MG: 40 INJECTION, POWDER, FOR SOLUTION INTRAVENOUS at 09:07

## 2023-07-14 RX ADMIN — VANCOMYCIN HYDROCHLORIDE 500 MG: 500 INJECTION, POWDER, LYOPHILIZED, FOR SOLUTION INTRAVENOUS at 04:07

## 2023-07-14 RX ADMIN — NOREPINEPHRINE BITARTRATE 0.12 MCG/KG/MIN: 4 INJECTION, SOLUTION INTRAVENOUS at 06:07

## 2023-07-14 RX ADMIN — IPRATROPIUM BROMIDE AND ALBUTEROL SULFATE 3 ML: 2.5; .5 SOLUTION RESPIRATORY (INHALATION) at 01:07

## 2023-07-14 RX ADMIN — IPRATROPIUM BROMIDE AND ALBUTEROL SULFATE 3 ML: 2.5; .5 SOLUTION RESPIRATORY (INHALATION) at 08:07

## 2023-07-14 RX ADMIN — IPRATROPIUM BROMIDE AND ALBUTEROL SULFATE 3 ML: 2.5; .5 SOLUTION RESPIRATORY (INHALATION) at 07:07

## 2023-07-14 RX ADMIN — NOREPINEPHRINE BITARTRATE 0.12 MCG/KG/MIN: 4 INJECTION, SOLUTION INTRAVENOUS at 04:07

## 2023-07-14 NOTE — PT/OT/SLP EVAL
Speech Language Pathology Evaluation  Bedside Swallow    Patient Name:  Nelsy Marino   MRN:  [id number]  Admitting Diagnosis: Septic shock    Recommendations:                 General Recommendations:  Dysphagia therapy  Diet recommendations:  Puree Diet - IDDSI Level 4, Thin liquids - IDDSI Level 0   Aspiration Precautions: Alternating bites/sips, Assistance with meals, HOB to 90 degrees, Meds crushed in puree, and Small bites/sips   General Precautions: Standard,    Communication strategies:  provide increased time to answer    Assessment:     Nelsy Marino is a 82 y.o. female with a dx of Septic shock who presents with mod oral dysphagia, impacted by fluctaitng SANTO, as well as edentulous status (despite upper and lower dentures at b/s, pt requesting to eat w/o). ST recs pureed diet with thin liquids. Meds crushed in puree. ST following.     History:     Past Medical History:   Diagnosis Date    CHF (congestive heart failure)     Diabetes mellitus     DVT (deep venous thrombosis)     Hypertension     Miscarriage     On home oxygen therapy     PRN       Past Surgical History:   Procedure Laterality Date     femoral vascular surgery       ANGIOGRAPHY OF LOWER EXTREMITY Right 2/23/2023    Procedure: Angiogram Extremity Unilateral;  Surgeon: Danny Dunbar MD;  Location: [facility] OR;  Service: Vascular;  Laterality: Right;  RN PHONE PREOP 2/20/2023----CK CONSENT    ANGIOGRAPHY OF LOWER EXTREMITY Right 4/4/2023    Procedure: Angiogram Extremity Unilateral - possible endovascular intervention;  Surgeon: René Simms MD;  Location: [facility] OR [address] [place] [address];  Service: Vascular;  Laterality: Right;  19.6 mins.  740.81 mGy  73.3399 Gy.cm  58ml dye  local - 1ml    COLONOSCOPY N/A 5/18/2023    Procedure: COLONOSCOPY;  Surgeon: Cuong Aguiar MD;  Location: [facility] ENDO;  Service: Endoscopy;  Laterality: N/A;    CREATION OF FEMORAL-FEMORAL ARTERY BYPASS WITH GRAFT Bilateral 3/2/2023    Procedure: CREATION, BYPASS, ARTERIAL,  FEMORAL TO FEMORAL, USING GRAFT, LEFT FEMORAL ENDARTERECTOMY, LEFT ILIAC ARTERY STENT PLACEMENT, RIGHT LOWER EXTREMITY ANGIOGRAM AND RIGHT SFA  ANGIOPLASTY;  Surgeon: Danny Dunbar MD;  Location: [facility] OR 2ND FLR;  Service: Vascular;  Laterality: Bilateral;   744.47 MGY  175.36 Gycm  Flouro time 20.5 mins      CREATION OF ILIOFEMORAL ARTERY BYPASS Right 3/20/2019    Procedure: CREATION, BYPASS, ARTERIAL, ILIAC TO FEMORAL, RIGHT LOWER EXTREMITY, RIGHT PROFUNDAPLASTY;  Surgeon: Danny Dunbar MD;  Location: [facility] OR;  Service: Vascular;  Laterality: Right;  11:00AM START PER NAZANIN RICHARDS PREOP 3/13/2019  ACT'S, CELL SAVER, GRAFTS, BOOK WATER---NEED H/P  CONSENT IN AM------HGBA1C    CREATION OF MUSCLE ROTATIONAL FLAP Left 3/31/2023    Procedure: CREATION, FLAP, MUSCLE ROTATION;  Surgeon: Brandon Smiley MD;  [facility];  Service: Plastics;  Laterality: Left;    ENDOSCOPY OF PROXIMAL SMALL INTESTINE N/A 5/18/2023    Procedure: ENTEROSCOPY, PROXIMAL;  Surgeon: Cuong Aguiar MD;  Location: [facility];  Service: Endoscopy;  Laterality: N/A;    ESOPHAGOGASTRODUODENOSCOPY N/A 3/17/2023    Procedure: EGD (ESOPHAGOGASTRODUODENOSCOPY);  Surgeon: Gee Rodriguez MD;  [facility]  Service: Endoscopy;  Laterality: N/A;    ESOPHAGOGASTRODUODENOSCOPY N/A 4/11/2023    Procedure: EGD (ESOPHAGOGASTRODUODENOSCOPY);  Surgeon: Nazanin Kaiser MD;  [facility]  Service: Endoscopy;  Laterality: N/A;    ESOPHAGOGASTRODUODENOSCOPY N/A 4/26/2023    Procedure: EGD (ESOPHAGOGASTRODUODENOSCOPY);  Surgeon: Gee Rodriguez MD;  [facility]  Service: Endoscopy;  Laterality: N/A;    ESOPHAGOGASTRODUODENOSCOPY N/A 5/2/2023    Procedure: EGD (ESOPHAGOGASTRODUODENOSCOPY);  Surgeon: Cuong Conteh MD;  Location: [facility] (2ND FLR);  Service: Endoscopy;  Laterality: N/A;    FOOT AMPUTATION THROUGH METATARSAL Right 3/4/2023    Procedure: AMPUTATION,  FOOT, TRANSMETATARSAL;  Surgeon: Benjamin Godfrey DPM;  [facility];  Service: Podiatry;  Laterality: Right;    HYSTERECTOMY      ?unsure cervix present    INCISION AND DRAINAGE Right 4/12/2019    Procedure: Incision and Drainage - R groin washout, possible muscle flap;  Surgeon: Danny Dunbar MD;  Location: [facility] OR 2ND FLR;  Service: Vascular;  Laterality: Right;  groin washout    INCISION AND DRAINAGE OF GROIN Right 5/3/2019    Procedure: Incision and Drainage R groin;  Surgeon: Danny Dunbar MD;  Location: [facility] OR [facility] FLR;  Service: Vascular;  Laterality: Right;    WOUND DEBRIDEMENT Left 3/31/2023    Procedure: DEBRIDEMENT, WOUND;  Surgeon: René Simms MD;  [facility];  Service: Vascular;  Laterality: Left;       Social History: Patient lives with her .    Prior Intubation HX:  None    Modified Barium Swallow: None per EMR.    Chest X-Rays:   7/11/23    FINDINGS:  Left-sided PICC line has partially retracted with distal tip seen over the SVC likely just proximal to the cavoatrial junction.  No significant change in cardiopulmonary status from earlier exam.  No pneumothorax.    Prior diet: Unknown 2/2 pt poor historian.    Subjective     ST obtained clearance from pt's nurse for b/s swallow evaluation prior to entrance. Pt was asleep, however, easily arouse to ST's greeting. Pt with x2 family members at b/s. Pt eager to consume po trials 2/2 prolonged NPO status.     Patient goals: Resume po intake      Pain/Comfort:  Pain Rating 1: 0/10    Respiratory Status: High flow, flow 4 L/min    Objective:     Oral Musculature Evaluation  Oral Musculature: WFL  Dentition: edentulous  Secretion Management: adequate  Mucosal Quality: good  Oral Labial Strength and Mobility: WFL  Lingual Strength and Mobility: WFL  Volitional Cough: Weak  Volitional Swallow: Delayed  Voice Prior to PO Intake: Dcr intensity    Bedside Swallow Eval:   Consistencies Assessed:  Thin liquids  -Half a cup of water via straw  Puree -Half a cup of pudding via tsp      Oral Phase:   Prolonged mastication  Oral residue  Slow oral transit time    Pharyngeal Phase:   decreased hyolaryngeal excursion to palpation  delayed swallow initation  no overt clinical signs/symptoms of aspiration  multiple spontaneous swallows    Compensatory Strategies  None    Treatment: It should be noted that silent aspiration cannot be r/o via b/s assessment. ST educated the pt and the pt's family members regarding recs for pureed and thin liquids. Pt and family v/u of all recs.    ST notified pt's nurse and MD regarding diet recs.    Goals:   Multidisciplinary Problems       SLP Goals          Problem: SLP    Goal Priority Disciplines Outcome   SLP Goal    Low SLP Ongoing, Progressing   Description: Goals:  1. Pt will tolerate a pureed diet with thin liquids w/o any overt s/s of aspiration.                          Plan:     Patient to be seen:  2 x/week   Plan of Care expires:     Plan of Care reviewed with:  patient, family   SLP Follow-Up:  Yes       Discharge recommendations:      Barriers to Discharge:  None    Time Tracking:     SLP Treatment Date:   07/14/23  Speech Start Time:  1030  Speech Stop Time:  1053     Speech Total Time (min):  23 min    Billable Minutes: Eval Swallow and Oral Function 13 and Self Care/Home Management Training 10    07/14/2023

## 2023-07-14 NOTE — PLAN OF CARE
Pt aaox1 to self. Pt on tele with nsr noted. Resp even and unlabored on o2@4l per nc with no distress noted. norepinephrine at 0.12mcg/kg/min infusing to LETTY TLC with no redness edema or pain noted. Pt has rivas patent and draining dark yellow urine to gravity. Pt has left groin incision poa, left knee scab poa, right toes amputated inscion healed poa, left heal unstageable pressure ulcer poa and stage 2 sacral ulcer poa noted. Left groin, left knee, Left heal and sacral area with dressings cd&I. Family at bedside. Bed low position with sr up and call light in reach.

## 2023-07-14 NOTE — PLAN OF CARE
Spouse not at bedside.  No return call as yet.  Other family members at beside.  Per rounds this am patient will remain her for a few more days.

## 2023-07-14 NOTE — NURSING
[facility], [facility]  Nurses Note -- 4 Eyes      7/14/2023       Skin assessed on: Q Shift      [x] No Pressure Injuries Present    [x]Prevention Measures Documented    [] Yes LDA  for Pressure Injury Previously documented     [] Yes New Pressure Injury Discovered   [] LDA for New Pressure Injury Added      Attending RN:  Fredo Saucedo, RN     Second RN:  Kadi Zapata RN.

## 2023-07-14 NOTE — PLAN OF CARE
The patient had bedside swallow eval, speech therapy recommended dysphagia diet.  The patient is still on norepi drip and rivas to monitor output.

## 2023-07-14 NOTE — NURSING
[facility], [facility]  Nurses Note -- 4 Eyes      7/13/2023       Skin assessed on: Q Shift      [] No Pressure Injuries Present    [x]Prevention Measures Documented    [x] Yes LDA  for Pressure Injury Previously documented     [] Yes New Pressure Injury Discovered   [] LDA for New Pressure Injury Added      Attending RN:  Kadi Zapata RN     Second RN:  Russ RICHARDS

## 2023-07-14 NOTE — PLAN OF CARE
Problem: SLP  Goal: SLP Goal  Description: Goals:  1. Pt will tolerate a pureed diet with thin liquids w/o any overt s/s of aspiration.     Outcome: Ongoing, Progressing    B/s swallow eval completed. ST recs pureed diet with thin liquids. Meds crushed in puree.

## 2023-07-14 NOTE — PROGRESS NOTES
[facility] Medicine  Progress Note    Patient Name: Nelsy Marino  MRN: [id number]  Patient Class: IP- Inpatient   Admission Date: 7/10/2023  Length of Stay: 4 days  Attending Physician: Jacobo Phillips MD  Primary Care Provider: Juan F Garay        Subjective:     Principal Problem:Septic shock        HPI:  82 year old female with PMHx of HFrEF, COPD, DM, HTN, DVT brought in to the ED by EMS due to change in mental status over the past 3 days. Patient altered and history obtained from ED physician and  at bedside. Patient's  reports that the patient has been confused and has been vomiting and complaining of abdominal pain with poor PO intake and he has noticed decreased urine output as well. Patient has not been ambulating due to a fall and recent amputation of R toes. In the ED, patient noted to be hypoxic requiring 2-4L NC, BNP elevated and CXR with some fluid and possible PNA. Patient started on sepsis protocol due to leukocytosis, low BP, and UTI. Found to have urinary retention and rivas placed. Started on IV abx and admitted to ICU for further management.       Overview/Hospital Course:  Patient admitted to the ICU with sepsis from possible pneumonia/UTI requiring pressor support. Pulmonary consulted for hypoxia which was likely due to pleural effusions, lethargy, volume overload and possible pneumonia  blood cultures were NG. Yeast in urine and started on Diflucan.  Patient with ESRD but patient/ refuse dialysis. Palliative care consulted.  would like to take patient home with hospice. Remains on pressor support       Interval History: No new issues. Remains on pressor support       Review of Systems   Unable to perform ROS: Acuity of condition   Objective:     Vital Signs (Most Recent):  Temp: 97.6 °F (36.4 °C) (07/14/23 0715)  Pulse: 63 (07/14/23 0715)  Resp: (!) 29 (07/14/23 0715)  BP: (!) 106/52 (07/14/23 0700)  SpO2: 95 % (07/14/23 0715)  Vital Signs (24h Range):  Temp:  [97.1 °F (36.2 °C)-98 °F (36.7 °C)] 97.6 °F (36.4 °C)  Pulse:  [58-63] 63  Resp:  [18-33] 29  SpO2:  [93 %-100 %] 95 %  BP: ()/(50-86) 106/52     Weight: 60.7 kg (133 lb 13.1 oz)  Body mass index is 24.48 kg/m².    Intake/Output Summary (Last 24 hours) at 7/14/2023 0730  Last data filed at 7/14/2023 0701  Gross per 24 hour   Intake 1154.29 ml   Output 245 ml   Net 909.29 ml         Physical Exam  Vitals and nursing note reviewed.   Constitutional:       Appearance: She is obese. She is ill-appearing.   Pulmonary:      Effort: Pulmonary effort is normal. No respiratory distress.   Skin:     General: Skin is warm and dry.   Neurological:      Mental Status: She is alert.   Psychiatric:         Behavior: Behavior normal.           Significant Labs: All pertinent labs within the past 24 hours have been reviewed.  BMP:   Recent Labs   Lab 07/14/23  0248   *   *   K 3.1*   CL 89*   CO2 25   BUN 78*   CREATININE 4.1*   CALCIUM 7.6*     CBC:   Recent Labs   Lab 07/13/23  0950   WBC 10.99   HGB 8.2*   HCT 25.7*          Significant Imaging: I have reviewed all pertinent imaging results/findings within the past 24 hours.      Assessment/Plan:      * Septic shock  - U/A dirty, CXR with possible PNA, BCx in process  - continue broad spectrum abx with IV vanc, zosyn, and azithromycin  - s/p 30 cc/kg of IVF, low dose levophed ordered if needed to maintain MAP > 65    Remains on pressor support. UTI with yeast. Started on diflucan. Pulmonary following for possible pneumonia   On Zosyn and zithromax and Vanc    Source not clear. Remains on 3 abx. Pressors required       Hypokalemia  Will replace      Acute blood loss anemia  Will heme stools. S/p blood with appropriate rise      DNR (do not resuscitate)  Per my discussion with  on 7/11/23      Acute cystitis  - as above    Diflucan for yeast in urine      Urinary retention  - rivas placed with good UOP  - monitor  output      Acute hypoxemic respiratory failure  - as under heart failure  - s/p IVF per sepsis protocol  - on 2-4L NC, wean as tolerated  - covering with abx for possible PNA  - repeat echo      Multifactorial- as per pulmonary    Moderate malnutrition  Nutrition consulted. Most recent weight and BMI monitored-     Measurements:  Wt Readings from Last 1 Encounters:   07/11/23 57.2 kg (126 lb)   Body mass index is 23.05 kg/m².    Patient has been screened and assessed by RD.    Malnutrition Type:  Context:    Level:      Malnutrition Characteristic Summary:       Interventions/Recommendations (treatment strategy):  1.    Debility  PT/OT when appropriate      LOLY (acute kidney injury)  - sCr up from baseline, pt with urinary retention and UTI  - good UOP after rivas placed, monitor I/O  - repeat BMP in AM    Patient refuses dialysis       Diastolic CHF, chronic  - elevated BNP and pt requiring 2-4L of oxygen  - s/p IVF in the ED per sepsis protocol  - repeat echo      Type 2 diabetes mellitus with chronic kidney disease, with long-term current use of insulin  -SSI for now, pt with very poor PO intake      Essential hypertension  -holding all BP meds due to sepsis        VTE Risk Mitigation (From admission, onward)         Ordered     IP VTE HIGH RISK PATIENT  Once         07/10/23 1920     Place sequential compression device  Until discontinued         07/10/23 1920     Place GABE hose  Until discontinued         07/10/23 1920     Place sequential compression device  Until discontinued         07/10/23 1920                Discharge Planning   JUAN ALBERTO: 7/14/2023     Code Status: DNR   Is the patient medically ready for discharge?:     Reason for patient still in hospital (select all that apply): Patient unstable  Discharge Plan A: Hospice/home            Critical care time spent on the evaluation and treatment of severe organ dysfunction, review of pertinent labs and imaging studies, discussions with consulting providers  and discussions with patient/family: 30 minutes.    Speech eval today for possible diet         Jacobo Norris MD  Department of Hospital Medicine   [facility] - Intensive Care

## 2023-07-14 NOTE — SUBJECTIVE & OBJECTIVE
Interval History: No new issues. Remains on pressor support       Review of Systems   Unable to perform ROS: Acuity of condition   Objective:     Vital Signs (Most Recent):  Temp: 97.6 °F (36.4 °C) (07/14/23 0715)  Pulse: 63 (07/14/23 0715)  Resp: (!) 29 (07/14/23 0715)  BP: (!) 106/52 (07/14/23 0700)  SpO2: 95 % (07/14/23 0715) Vital Signs (24h Range):  Temp:  [97.1 °F (36.2 °C)-98 °F (36.7 °C)] 97.6 °F (36.4 °C)  Pulse:  [58-63] 63  Resp:  [18-33] 29  SpO2:  [93 %-100 %] 95 %  BP: ()/(50-86) 106/52     Weight: 60.7 kg (133 lb 13.1 oz)  Body mass index is 24.48 kg/m².    Intake/Output Summary (Last 24 hours) at 7/14/2023 0730  Last data filed at 7/14/2023 0701  Gross per 24 hour   Intake 1154.29 ml   Output 245 ml   Net 909.29 ml         Physical Exam  Vitals and nursing note reviewed.   Constitutional:       Appearance: She is obese. She is ill-appearing.   Pulmonary:      Effort: Pulmonary effort is normal. No respiratory distress.   Skin:     General: Skin is warm and dry.   Neurological:      Mental Status: She is alert.   Psychiatric:         Behavior: Behavior normal.           Significant Labs: All pertinent labs within the past 24 hours have been reviewed.  BMP:   Recent Labs   Lab 07/14/23  0248   *   *   K 3.1*   CL 89*   CO2 25   BUN 78*   CREATININE 4.1*   CALCIUM 7.6*     CBC:   Recent Labs   Lab 07/13/23  0950   WBC 10.99   HGB 8.2*   HCT 25.7*          Significant Imaging: I have reviewed all pertinent imaging results/findings within the past 24 hours.

## 2023-07-14 NOTE — PROGRESS NOTES
Pharmacokinetic Assessment Follow Up: IV Vancomycin    Vancomycin serum concentration assessment(s):    The random level was drawn correctly and can be used to guide therapy at this time. The measurement is within the desired definitive target range of 10 to 20 mcg/mL.    Vancomycin Regimen Plan:    Dose 500 mg x 1 today 7/14    Re-dose when the random level is less than 20 mcg/mL, next level to be drawn at 0300 on 7/15    Drug levels (last 3 results):  Recent Labs   Lab Result Units 07/12/23  0304 07/13/23  0324 07/14/23  0248   Vancomycin, Random ug/mL 19.3 15.2 18.8       Pharmacy will continue to follow and monitor vancomycin.    Please contact pharmacy at extension [phone] for questions regarding this assessment.    Thank you for the consult,   Rigo Hernandez       Patient brief summary:  Nelsy Marino is a 82 y.o. female initiated on antimicrobial therapy with IV Vancomycin for treatment of sepsis      Drug Allergies:   Review of patient's allergies indicates:   Allergen Reactions    Motrin [ibuprofen] Itching       Actual Body Weight:   60.3 kg    Renal Function:   Estimated Creatinine Clearance: 9.1 mL/min (A) (based on SCr of 4.1 mg/dL (H)).,     Dialysis Method (if applicable):  N/A    CBC (last 72 hours):  Recent Labs   Lab Result Units 07/11/23  1217 07/11/23  1403 07/12/23  0304 07/13/23  0950   WBC K/uL 12.35  --  11.88 10.99   Hemoglobin g/dL 6.5* 6.6* 8.4* 8.2*   Hematocrit % 22.0* 21.4* 26.0* 25.7*   Platelets K/uL 445  --  371 332   Gran % % 77.9*  --  86.1* 85.7*   Lymph % % 12.0*  --  6.9* 6.4*   Mono % % 7.4  --  4.0 4.5   Eosinophil % % 2.0  --  2.2 2.8   Basophil % % 0.2  --  0.2 0.1   Differential Method  Automated  --  Automated Automated       Metabolic Panel (last 72 hours):  Recent Labs   Lab Result Units 07/11/23  1217 07/12/23  0304 07/14/23  0248   Sodium mmol/L 134* 132* 130*   Potassium mmol/L 3.9 3.5 3.1*   Chloride mmol/L 94* 92* 89*   CO2 mmol/L 22* 25 25   Glucose mg/dL 148*  148* 176*   BUN mg/dL 72* 74* 78*   Creatinine mg/dL 4.7* 4.5* 4.1*       Vancomycin Administrations:  vancomycin given in the last 96 hours                     vancomycin (VANCOCIN) 500 mg in dextrose 5 % in water (D5W) 5 % 100 mL IVPB (MB+) (mg) 500 mg New Bag 07/13/23 0435    vancomycin 750 mg in dextrose 5 % (D5W) 250 mL IVPB (Vial-Mate) (mg) 750 mg New Bag 07/11/23 0946    vancomycin (VANCOCIN) 1,000 mg in dextrose 5 % (D5W) 250 mL IVPB (Vial-Mate) (mg) 1,000 mg New Bag 07/10/23 1757                    Microbiologic Results:  Microbiology Results (last 7 days)       Procedure Component Value Units Date/Time    Blood culture x two cultures. Draw prior to antibiotics. [[id number]] Collected: 07/10/23 1530    Order Status: Completed Specimen: Blood from Peripheral, Antecubital, Right Updated: 07/13/23 1703     Blood Culture, Routine No Growth to date      No Growth to date      No Growth to date      No Growth to date    Narrative:      Aerobic and anaerobic    Blood culture x two cultures. Draw prior to antibiotics. [[id number]] Collected: 07/10/23 1539    Order Status: Completed Specimen: Blood from Peripheral, Wrist, Left Updated: 07/13/23 1703     Blood Culture, Routine No Growth to date      No Growth to date      No Growth to date      No Growth to date    Narrative:      Aerobic and anaerobic    Urine culture [[id number]]  (Abnormal) Collected: 07/10/23 1706    Order Status: Completed Specimen: Urine Updated: 07/13/23 1204     Urine Culture, Routine FELI GLABRATA  > 100,000 cfu/ml  Treatment of asymptomatic candiduria is not recommended (except for   specific populations). Candida isolated in the urine typically   represents colonization. If an indwelling urinary catheter is present  it should be removed or replaced.      Narrative:      Specimen Source->Urine

## 2023-07-14 NOTE — ASSESSMENT & PLAN NOTE
- U/A dirty, CXR with possible PNA, BCx in process  - continue broad spectrum abx with IV vanc, zosyn, and azithromycin  - s/p 30 cc/kg of IVF, low dose levophed ordered if needed to maintain MAP > 65    Remains on pressor support. UTI with yeast. Started on diflucan. Pulmonary following for possible pneumonia   On Zosyn and zithromax and Vanc    Source not clear. Remains on 3 abx. Pressors required

## 2023-07-15 LAB
ANION GAP SERPL CALC-SCNC: 15 MMOL/L (ref 8–16)
BUN SERPL-MCNC: 75 MG/DL (ref 8–23)
CALCIUM SERPL-MCNC: 7.7 MG/DL (ref 8.7–10.5)
CHLORIDE SERPL-SCNC: 89 MMOL/L (ref 95–110)
CO2 SERPL-SCNC: 27 MMOL/L (ref 23–29)
CREAT SERPL-MCNC: 3.3 MG/DL (ref 0.5–1.4)
EST. GFR  (NO RACE VARIABLE): 13 ML/MIN/1.73 M^2
GLUCOSE SERPL-MCNC: 118 MG/DL (ref 70–110)
POCT GLUCOSE: 104 MG/DL (ref 70–110)
POCT GLUCOSE: 110 MG/DL (ref 70–110)
POCT GLUCOSE: 126 MG/DL (ref 70–110)
POCT GLUCOSE: 143 MG/DL (ref 70–110)
POTASSIUM SERPL-SCNC: 2.5 MMOL/L (ref 3.5–5.1)
POTASSIUM SERPL-SCNC: 2.9 MMOL/L (ref 3.5–5.1)
SODIUM SERPL-SCNC: 131 MMOL/L (ref 136–145)
VANCOMYCIN SERPL-MCNC: 21.4 UG/ML

## 2023-07-15 PROCEDURE — 80202 ASSAY OF VANCOMYCIN: CPT | Performed by: HOSPITALIST

## 2023-07-15 PROCEDURE — 20000000 HC ICU ROOM

## 2023-07-15 PROCEDURE — 94640 AIRWAY INHALATION TREATMENT: CPT

## 2023-07-15 PROCEDURE — 25000003 PHARM REV CODE 250: Performed by: STUDENT IN AN ORGANIZED HEALTH CARE EDUCATION/TRAINING PROGRAM

## 2023-07-15 PROCEDURE — 25000242 PHARM REV CODE 250 ALT 637 W/ HCPCS: Performed by: STUDENT IN AN ORGANIZED HEALTH CARE EDUCATION/TRAINING PROGRAM

## 2023-07-15 PROCEDURE — 63600175 PHARM REV CODE 636 W HCPCS: Performed by: STUDENT IN AN ORGANIZED HEALTH CARE EDUCATION/TRAINING PROGRAM

## 2023-07-15 PROCEDURE — 92526 ORAL FUNCTION THERAPY: CPT

## 2023-07-15 PROCEDURE — A4216 STERILE WATER/SALINE, 10 ML: HCPCS | Performed by: INTERNAL MEDICINE

## 2023-07-15 PROCEDURE — 25000003 PHARM REV CODE 250: Performed by: INTERNAL MEDICINE

## 2023-07-15 PROCEDURE — C9113 INJ PANTOPRAZOLE SODIUM, VIA: HCPCS | Performed by: INTERNAL MEDICINE

## 2023-07-15 PROCEDURE — 80048 BASIC METABOLIC PNL TOTAL CA: CPT | Performed by: INTERNAL MEDICINE

## 2023-07-15 PROCEDURE — 63600175 PHARM REV CODE 636 W HCPCS: Performed by: INTERNAL MEDICINE

## 2023-07-15 PROCEDURE — 84132 ASSAY OF SERUM POTASSIUM: CPT | Performed by: INTERNAL MEDICINE

## 2023-07-15 PROCEDURE — 63700000 PHARM REV CODE 250 ALT 637 W/O HCPCS: Performed by: INTERNAL MEDICINE

## 2023-07-15 PROCEDURE — 94761 N-INVAS EAR/PLS OXIMETRY MLT: CPT

## 2023-07-15 RX ORDER — POTASSIUM CHLORIDE 7.45 MG/ML
10 INJECTION INTRAVENOUS
Status: COMPLETED | OUTPATIENT
Start: 2023-07-15 | End: 2023-07-15

## 2023-07-15 RX ORDER — MIDODRINE HYDROCHLORIDE 5 MG/1
5 TABLET ORAL 3 TIMES DAILY
Status: DISCONTINUED | OUTPATIENT
Start: 2023-07-15 | End: 2023-07-18 | Stop reason: HOSPADM

## 2023-07-15 RX ORDER — POTASSIUM CHLORIDE 7.45 MG/ML
10 INJECTION INTRAVENOUS
Status: DISPENSED | OUTPATIENT
Start: 2023-07-15 | End: 2023-07-15

## 2023-07-15 RX ADMIN — MORPHINE SULFATE 4 MG: 4 INJECTION INTRAVENOUS at 08:07

## 2023-07-15 RX ADMIN — POTASSIUM CHLORIDE 10 MEQ: 7.46 INJECTION, SOLUTION INTRAVENOUS at 09:07

## 2023-07-15 RX ADMIN — POTASSIUM CHLORIDE 10 MEQ: 7.46 INJECTION, SOLUTION INTRAVENOUS at 11:07

## 2023-07-15 RX ADMIN — FLUCONAZOLE 100 MG: 100 TABLET ORAL at 08:07

## 2023-07-15 RX ADMIN — POTASSIUM CHLORIDE 10 MEQ: 7.46 INJECTION, SOLUTION INTRAVENOUS at 01:07

## 2023-07-15 RX ADMIN — ACETAMINOPHEN 650 MG: 325 TABLET ORAL at 11:07

## 2023-07-15 RX ADMIN — MUPIROCIN: 20 OINTMENT TOPICAL at 08:07

## 2023-07-15 RX ADMIN — IPRATROPIUM BROMIDE AND ALBUTEROL SULFATE 3 ML: 2.5; .5 SOLUTION RESPIRATORY (INHALATION) at 07:07

## 2023-07-15 RX ADMIN — MIDODRINE HYDROCHLORIDE 5 MG: 5 TABLET ORAL at 03:07

## 2023-07-15 RX ADMIN — PANTOPRAZOLE SODIUM 40 MG: 40 INJECTION, POWDER, FOR SOLUTION INTRAVENOUS at 08:07

## 2023-07-15 RX ADMIN — PIPERACILLIN AND TAZOBACTAM 4.5 G: 4; .5 INJECTION, POWDER, LYOPHILIZED, FOR SOLUTION INTRAVENOUS; PARENTERAL at 06:07

## 2023-07-15 RX ADMIN — IPRATROPIUM BROMIDE AND ALBUTEROL SULFATE 3 ML: 2.5; .5 SOLUTION RESPIRATORY (INHALATION) at 12:07

## 2023-07-15 RX ADMIN — POTASSIUM CHLORIDE 10 MEQ: 7.46 INJECTION, SOLUTION INTRAVENOUS at 08:07

## 2023-07-15 RX ADMIN — POTASSIUM CHLORIDE 10 MEQ: 7.46 INJECTION, SOLUTION INTRAVENOUS at 03:07

## 2023-07-15 RX ADMIN — POTASSIUM CHLORIDE 10 MEQ: 7.46 INJECTION, SOLUTION INTRAVENOUS at 12:07

## 2023-07-15 RX ADMIN — OXYCODONE AND ACETAMINOPHEN 1 TABLET: 5; 325 TABLET ORAL at 05:07

## 2023-07-15 RX ADMIN — Medication 10 ML: at 05:07

## 2023-07-15 RX ADMIN — PIPERACILLIN AND TAZOBACTAM 4.5 G: 4; .5 INJECTION, POWDER, LYOPHILIZED, FOR SOLUTION INTRAVENOUS; PARENTERAL at 05:07

## 2023-07-15 RX ADMIN — POTASSIUM CHLORIDE 10 MEQ: 7.46 INJECTION, SOLUTION INTRAVENOUS at 10:07

## 2023-07-15 RX ADMIN — POTASSIUM CHLORIDE 10 MEQ: 7.46 INJECTION, SOLUTION INTRAVENOUS at 06:07

## 2023-07-15 RX ADMIN — Medication 10 ML: at 12:07

## 2023-07-15 RX ADMIN — POTASSIUM CHLORIDE 10 MEQ: 7.46 INJECTION, SOLUTION INTRAVENOUS at 07:07

## 2023-07-15 RX ADMIN — MIDODRINE HYDROCHLORIDE 5 MG: 5 TABLET ORAL at 08:07

## 2023-07-15 RX ADMIN — Medication 10 ML: at 06:07

## 2023-07-15 NOTE — NURSING
[facility] [place]  Nurses Note -- 4 Eyes      7/15/2023       Skin assessed on: Q Shift      [] No Pressure Injuries Present    []Prevention Measures Documented    [] Yes LDA  for Pressure Injury Previously documented      Yes New Pressure Injury Discovered   [x] LDA for New Pressure Injury Added      Attending RN:  Nazanin Willams, RN     Second RN:  Janey Griffin

## 2023-07-15 NOTE — PLAN OF CARE
Problem: Adult Inpatient Plan of Care  Goal: Plan of Care Review  Outcome: Ongoing, Progressing  Goal: Patient-Specific Goal (Individualized)  Outcome: Ongoing, Progressing  Goal: Readiness for Transition of Care  Outcome: Ongoing, Progressing     Problem: Diabetes Comorbidity  Goal: Blood Glucose Level Within Targeted Range  Outcome: Ongoing, Progressing     Problem: Adjustment to Illness (Sepsis/Septic Shock)  Goal: Optimal Coping  Outcome: Ongoing, Progressing     Problem: Infection Progression (Sepsis/Septic Shock)  Goal: Absence of Infection Signs and Symptoms  Outcome: Ongoing, Progressing     Problem: Fluid and Electrolyte Imbalance (Acute Kidney Injury/Impairment)  Goal: Fluid and Electrolyte Balance  Outcome: Ongoing, Progressing

## 2023-07-15 NOTE — NURSING
[facility], [facility]  Nurses Note -- 4 Eyes      7/14/2023       Skin assessed on: Q Shift      [x] No Pressure Injuries Present    [x]Prevention Measures Documented    [] Yes LDA  for Pressure Injury Previously documented     [] Yes New Pressure Injury Discovered   [] LDA for New Pressure Injury Added      Attending RN:  Giovanna Sanchez RN     Second RN:  Fredo Saucedo RN

## 2023-07-15 NOTE — SUBJECTIVE & OBJECTIVE
Interval History: No new issues     Review of Systems   Unable to perform ROS: Acuity of condition   Objective:     Vital Signs (Most Recent):  Temp: 98.4 °F (36.9 °C) (07/15/23 0748)  Pulse: 74 (07/15/23 0748)  Resp: (!) 24 (07/15/23 0748)  BP: 109/65 (07/15/23 0748)  SpO2: 100 % (07/15/23 0748) Vital Signs (24h Range):  Temp:  [97.7 °F (36.5 °C)-98.4 °F (36.9 °C)] 98.4 °F (36.9 °C)  Pulse:  [63-77] 74  Resp:  [14-47] 24  SpO2:  [92 %-100 %] 100 %  BP: ()/(50-72) 109/65     Weight: 60.7 kg (133 lb 13.1 oz)  Body mass index is 24.48 kg/m².    Intake/Output Summary (Last 24 hours) at 7/15/2023 0803  Last data filed at 7/15/2023 0630  Gross per 24 hour   Intake 346.39 ml   Output 515 ml   Net -168.61 ml         Physical Exam  Vitals and nursing note reviewed.   Constitutional:       Appearance: She is obese. She is ill-appearing.   Pulmonary:      Effort: Pulmonary effort is normal. No respiratory distress.   Skin:     General: Skin is warm and dry.   Neurological:      Mental Status: She is alert.   Psychiatric:         Behavior: Behavior normal.           Significant Labs: All pertinent labs within the past 24 hours have been reviewed.  BMP:   Recent Labs   Lab 07/14/23  0248   *   *   K 3.1*   CL 89*   CO2 25   BUN 78*   CREATININE 4.1*   CALCIUM 7.6*     CBC:   Recent Labs   Lab 07/13/23  0950   WBC 10.99   HGB 8.2*   HCT 25.7*          Significant Imaging:

## 2023-07-15 NOTE — PROGRESS NOTES
[facility] Medicine  Progress Note    Patient Name: Nelsy Marino  MRN: [id number]  Patient Class: IP- Inpatient   Admission Date: 7/10/2023  Length of Stay: 5 days  Attending Physician: Jacobo Phillips MD  Primary Care Provider: Juan F Garay        Subjective:     Principal Problem:Septic shock        HPI:  82 year old female with PMHx of HFrEF, COPD, DM, HTN, DVT brought in to the ED by EMS due to change in mental status over the past 3 days. Patient altered and history obtained from ED physician and  at bedside. Patient's  reports that the patient has been confused and has been vomiting and complaining of abdominal pain with poor PO intake and he has noticed decreased urine output as well. Patient has not been ambulating due to a fall and recent amputation of R toes. In the ED, patient noted to be hypoxic requiring 2-4L NC, BNP elevated and CXR with some fluid and possible PNA. Patient started on sepsis protocol due to leukocytosis, low BP, and UTI. Found to have urinary retention and rivas placed. Started on IV abx and admitted to ICU for further management.       Overview/Hospital Course:  Patient admitted to the ICU with sepsis from possible pneumonia/UTI requiring pressor support. Pulmonary consulted for hypoxia which was likely due to pleural effusions, lethargy, volume overload and possible pneumonia  blood cultures were NG. Yeast in urine and started on Diflucan.  Patient with ESRD but patient/ refuse dialysis. Palliative care consulted.  would like to take patient home with hospice. Remains on pressor support. Family wishes to give the patient more time.      Interval History: No new issues     Review of Systems   Unable to perform ROS: Acuity of condition   Objective:     Vital Signs (Most Recent):  Temp: 98.4 °F (36.9 °C) (07/15/23 0748)  Pulse: 74 (07/15/23 0748)  Resp: (!) 24 (07/15/23 0748)  BP: 109/65 (07/15/23 0748)  SpO2: 100 %  (07/15/23 0748) Vital Signs (24h Range):  Temp:  [97.7 °F (36.5 °C)-98.4 °F (36.9 °C)] 98.4 °F (36.9 °C)  Pulse:  [63-77] 74  Resp:  [14-47] 24  SpO2:  [92 %-100 %] 100 %  BP: ()/(50-72) 109/65     Weight: 60.7 kg (133 lb 13.1 oz)  Body mass index is 24.48 kg/m².    Intake/Output Summary (Last 24 hours) at 7/15/2023 0803  Last data filed at 7/15/2023 0630  Gross per 24 hour   Intake 346.39 ml   Output 515 ml   Net -168.61 ml         Physical Exam  Vitals and nursing note reviewed.   Constitutional:       Appearance: She is obese. She is ill-appearing.   Pulmonary:      Effort: Pulmonary effort is normal. No respiratory distress.   Skin:     General: Skin is warm and dry.   Neurological:      Mental Status: She is alert.   Psychiatric:         Behavior: Behavior normal.           Significant Labs: All pertinent labs within the past 24 hours have been reviewed.  BMP:   Recent Labs   Lab 07/14/23  0248   *   *   K 3.1*   CL 89*   CO2 25   BUN 78*   CREATININE 4.1*   CALCIUM 7.6*     CBC:   Recent Labs   Lab 07/13/23  0950   WBC 10.99   HGB 8.2*   HCT 25.7*          Significant Imaging:       Assessment/Plan:      * Septic shock  - U/A dirty, CXR with possible PNA, BCx in process  - continue broad spectrum abx with IV vanc, zosyn, and azithromycin  - s/p 30 cc/kg of IVF, low dose levophed ordered if needed to maintain MAP > 65    Remains on pressor support. UTI with yeast. Started on diflucan. Pulmonary following for possible pneumonia   On Zosyn and zithromax and Vanc    Source not clear. Remains on 3 abx. Pressors required       Hypokalemia  Will replace      Acute blood loss anemia  Will heme stools. S/p blood with appropriate rise      DNR (do not resuscitate)  Per my discussion with  on 7/11/23      Acute cystitis  - as above    Diflucan for yeast in urine      Urinary retention  - rivas placed with good UOP  - monitor output      Acute hypoxemic respiratory failure  - as under  heart failure  - s/p IVF per sepsis protocol  - on 2-4L NC, wean as tolerated  - covering with abx for possible PNA  - repeat echo      Multifactorial- as per pulmonary    Moderate malnutrition  Nutrition consulted. Most recent weight and BMI monitored-     Measurements:  Wt Readings from Last 1 Encounters:   07/11/23 57.2 kg (126 lb)   Body mass index is 23.05 kg/m².    Patient has been screened and assessed by RD.    Malnutrition Type:  Context:    Level:      Malnutrition Characteristic Summary:       Interventions/Recommendations (treatment strategy):  1.    Debility  PT/OT when appropriate      LOLY (acute kidney injury)  - sCr up from baseline, pt with urinary retention and UTI  - good UOP after rivas placed, monitor I/O  - repeat BMP in AM    Patient refuses dialysis       Palliative care encounter  Family wishes more time on Abx and Pressors.      Diastolic CHF, chronic  - elevated BNP and pt requiring 2-4L of oxygen  - s/p IVF in the ED per sepsis protocol  - repeat echo      Type 2 diabetes mellitus with chronic kidney disease, with long-term current use of insulin  -SSI for now, pt with very poor PO intake      Essential hypertension  -holding all BP meds due to sepsis        VTE Risk Mitigation (From admission, onward)         Ordered     IP VTE HIGH RISK PATIENT  Once         07/10/23 1920     Place sequential compression device  Until discontinued         07/10/23 1920     Place GABE hose  Until discontinued         07/10/23 1920     Place sequential compression device  Until discontinued         07/10/23 1920                Discharge Planning   UJAN ALBERTO:      Code Status: DNR   Is the patient medically ready for discharge?:     Reason for patient still in hospital (select all that apply): Patient unstable  Discharge Plan A: Hospice/home        Started Midodrine     Critical care time spent on the evaluation and treatment of severe organ dysfunction, review of pertinent labs and imaging studies, discussions  with consulting providers and discussions with patient/family: 35 minutes.      Jacobo Norris MD  Department of Hospital Medicine   [facility] - [place] - Intensive Care

## 2023-07-15 NOTE — PT/OT/SLP PROGRESS
Occupational Therapy      Patient Name:  Nelsy Marino   MRN:  [id number]    Patient not seen today secondary to low BP upon entry that further decreased to 87/49 (MAP 64) when HOB was fully elevated; pt was repositioned in bed and HOB was moderately flattened with BP 97/52 . Will follow-up 7/16/23.    7/15/2023

## 2023-07-15 NOTE — ED PROVIDER NOTES
Nelsy Marino is a 82 y.o. female patient.    Follow for LOLY    Awake, following comment  No distress    Scheduled Meds:   albuterol-ipratropium  3 mL Nebulization Q6H    fluconazole  100 mg Oral Daily    metOLazone  5 mg Oral Daily    mupirocin   Nasal BID    pantoprazole  40 mg Intravenous BID    piperacillin-tazobactam (Zosyn) IV (PEDS and ADULTS) (extended infusion is not appropriate)  4.5 g Intravenous Q12H    potassium chloride  60 mEq Oral Once    sodium chloride 0.9%  10 mL Intravenous Q6H       Review of patient's allergies indicates:   Allergen Reactions    Motrin [ibuprofen] Itching         Vital Signs Range (Last 24H):  Temp:  [97.6 °F (36.4 °C)-98.4 °F (36.9 °C)]   Pulse:  [63-77]   Resp:  [14-47]   BP: ()/(50-72)   SpO2:  [92 %-100 %]     I & O (Last 24H):  Intake/Output Summary (Last 24 hours) at 7/15/2023 0651  Last data filed at 7/14/2023 1800  Gross per 24 hour   Intake 444.77 ml   Output 200 ml   Net 244.77 ml           Physical Exam:  General appearance: well developed, cachectic, no distress  Lungs:  normal respiratory effort and coarse breath sounds (B)  Heart: regular rate and rhythm  Abdomen:  soft, (+) bowel sounds  Extremities: edema trace    Laboratory:  I have reviewed all pertinent lab results within the past 24 hours.  CBC:   Recent Labs   Lab 07/13/23  0950   WBC 10.99   RBC 3.11*   HGB 8.2*   HCT 25.7*      MCV 83   MCH 26.4*   MCHC 31.9*     CMP:   Recent Labs   Lab 07/10/23  1543 07/11/23  1217 07/14/23  0248   GLU 84   < > 176*   CALCIUM 9.0   < > 7.6*   ALBUMIN 2.4*  --   --    PROT 7.9  --   --       < > 130*   K 3.8   < > 3.1*   CO2 24   < > 25   CL 91*   < > 89*   BUN 71*   < > 78*   CREATININE 4.9*   < > 4.1*   ALKPHOS 122  --   --    ALT 10  --   --    AST 33  --   --    BILITOT 0.4  --   --     < > = values in this interval not displayed.     ABGs: No results for input(s): PH, PCO2, PO2, HCO3, POCSATURATED, BE in the last 168 hours.    Assessment &  Plan    LOLY - creatinine trending down  Septic shock - improving, off pressor  Urinary retention  Yeast UTI  Anemia of chronic disease    Continue present Rx  Patient does not want dialysis  Conservative treatment        Tiffany Murillo  7/15/2023

## 2023-07-15 NOTE — PLAN OF CARE
Problem: SLP  Goal: SLP Goal  Description: Goals:  1. Pt will tolerate a pureed diet with thin liquids w/o any overt s/s of aspiration.     Outcome: Ongoing, Progressing   Tolerating diet well w/o overt s/s of aspiration. Ongoing lethargy.

## 2023-07-15 NOTE — PT/OT/SLP PROGRESS
Speech Language Pathology Treatment    Patient Name:  Nelsy Marino   MRN:  [id number]  Admitting Diagnosis: Septic shock    Recommendations:                 General Recommendations:  Dysphagia therapy  Diet recommendations:  Puree Diet - IDDSI Level 4, Liquid Diet Level: Thin liquids - IDDSI Level 0   Aspiration Precautions: 1 bite/sip at a time, Alternating bites/sips, Assistance with meals, Frequent oral care, Meds crushed in puree, Remain upright 30 minutes post meal, and Small bites/sips   General Precautions: Standard, pureed diet  Communication strategies:  provide increased time to answer    Assessment:     Nelsy Marino is a 82 y.o. female with a a dx of Septic shock who presents with mod oral dysphagia, impacted by fluctaitng SANTO, as well as edentulous status (despite upper and lower dentures at b/s, pt requesting to eat w/o). ST tolerating current diet well w/o overt s/s of aspiration. ST will con't to follow.     Subjective    Daughter attempting to feed pt lunch upon SLP arrival. Pt with variable SANTO during ST session. Pt able to state name and place. Minimal verbalization 2/2 lethargy.      Pain/Comfort:  Pain Rating 1: 0/10    Respiratory Status: High flow, flow 4 L/min    Objective:     Has the patient been evaluated by SLP for swallowing?   Yes  Keep patient NPO? No   Current Respiratory Status:        Pt accepted 3 trials of pureed and 4 trials of lemonade via straw presented by SLP. Pt with prolonged mastication and slow A/P transport of bolus for pureed textures. Slightly delayed swallow initiation across trials. No overt s/s of aspiration noted during therapeutic trials. Session ended early 2/2 pt lethargy and inability to remain awake.    Goals:   Multidisciplinary Problems       SLP Goals          Problem: SLP    Goal Priority Disciplines Outcome   SLP Goal    Low SLP Ongoing, Progressing   Description: Goals:  1. Pt will tolerate a pureed diet with thin liquids w/o any overt s/s of  aspiration.                          Plan:     Patient to be seen:  2 x/week   Plan of Care expires:     Plan of Care reviewed with:  patient, spouse, daughter   SLP Follow-Up:  Yes       Discharge recommendations:      Barriers to Discharge:  None    Time Tracking:     SLP Treatment Date:   07/15/23  Speech Start Time:  1143  Speech Stop Time:  1157     Speech Total Time (min):  14 min    Billable Minutes: Treatment Swallowing Dysfunction 14 min    07/15/2023

## 2023-07-16 LAB
ALBUMIN SERPL BCP-MCNC: 1.6 G/DL (ref 3.5–5.2)
ALP SERPL-CCNC: 77 U/L (ref 55–135)
ALT SERPL W/O P-5'-P-CCNC: 11 U/L (ref 10–44)
ANION GAP SERPL CALC-SCNC: 12 MMOL/L (ref 8–16)
AST SERPL-CCNC: 37 U/L (ref 10–40)
BILIRUB SERPL-MCNC: 0.4 MG/DL (ref 0.1–1)
BUN SERPL-MCNC: 71 MG/DL (ref 8–23)
CALCIUM SERPL-MCNC: 7.7 MG/DL (ref 8.7–10.5)
CHLORIDE SERPL-SCNC: 94 MMOL/L (ref 95–110)
CO2 SERPL-SCNC: 27 MMOL/L (ref 23–29)
CREAT SERPL-MCNC: 2.4 MG/DL (ref 0.5–1.4)
EST. GFR  (NO RACE VARIABLE): 20 ML/MIN/1.73 M^2
GLUCOSE SERPL-MCNC: 86 MG/DL (ref 70–110)
POCT GLUCOSE: 79 MG/DL (ref 70–110)
POCT GLUCOSE: 82 MG/DL (ref 70–110)
POCT GLUCOSE: 89 MG/DL (ref 70–110)
POCT GLUCOSE: 99 MG/DL (ref 70–110)
POTASSIUM SERPL-SCNC: 3.1 MMOL/L (ref 3.5–5.1)
PROT SERPL-MCNC: 5.6 G/DL (ref 6–8.4)
SODIUM SERPL-SCNC: 133 MMOL/L (ref 136–145)
VANCOMYCIN SERPL-MCNC: 15.8 UG/ML

## 2023-07-16 PROCEDURE — 25000003 PHARM REV CODE 250: Performed by: INTERNAL MEDICINE

## 2023-07-16 PROCEDURE — 63600175 PHARM REV CODE 636 W HCPCS: Performed by: STUDENT IN AN ORGANIZED HEALTH CARE EDUCATION/TRAINING PROGRAM

## 2023-07-16 PROCEDURE — A4216 STERILE WATER/SALINE, 10 ML: HCPCS | Performed by: INTERNAL MEDICINE

## 2023-07-16 PROCEDURE — C9113 INJ PANTOPRAZOLE SODIUM, VIA: HCPCS | Performed by: INTERNAL MEDICINE

## 2023-07-16 PROCEDURE — 80053 COMPREHEN METABOLIC PANEL: CPT | Performed by: INTERNAL MEDICINE

## 2023-07-16 PROCEDURE — 80202 ASSAY OF VANCOMYCIN: CPT | Performed by: HOSPITALIST

## 2023-07-16 PROCEDURE — 94760 N-INVAS EAR/PLS OXIMETRY 1: CPT

## 2023-07-16 PROCEDURE — 27000221 HC OXYGEN, UP TO 24 HOURS

## 2023-07-16 PROCEDURE — 25000242 PHARM REV CODE 250 ALT 637 W/ HCPCS: Performed by: INTERNAL MEDICINE

## 2023-07-16 PROCEDURE — 63700000 PHARM REV CODE 250 ALT 637 W/O HCPCS: Performed by: INTERNAL MEDICINE

## 2023-07-16 PROCEDURE — 94761 N-INVAS EAR/PLS OXIMETRY MLT: CPT

## 2023-07-16 PROCEDURE — 25000242 PHARM REV CODE 250 ALT 637 W/ HCPCS: Performed by: STUDENT IN AN ORGANIZED HEALTH CARE EDUCATION/TRAINING PROGRAM

## 2023-07-16 PROCEDURE — 63600175 PHARM REV CODE 636 W HCPCS: Performed by: INTERNAL MEDICINE

## 2023-07-16 PROCEDURE — 25000003 PHARM REV CODE 250: Performed by: STUDENT IN AN ORGANIZED HEALTH CARE EDUCATION/TRAINING PROGRAM

## 2023-07-16 PROCEDURE — 94640 AIRWAY INHALATION TREATMENT: CPT

## 2023-07-16 PROCEDURE — 21400001 HC TELEMETRY ROOM

## 2023-07-16 RX ORDER — POTASSIUM CHLORIDE 7.45 MG/ML
10 INJECTION INTRAVENOUS
Status: COMPLETED | OUTPATIENT
Start: 2023-07-16 | End: 2023-07-16

## 2023-07-16 RX ORDER — GABAPENTIN 100 MG/1
100 CAPSULE ORAL 3 TIMES DAILY
Status: DISCONTINUED | OUTPATIENT
Start: 2023-07-16 | End: 2023-07-18 | Stop reason: HOSPADM

## 2023-07-16 RX ORDER — HYDROMORPHONE HYDROCHLORIDE 1 MG/ML
1 INJECTION, SOLUTION INTRAMUSCULAR; INTRAVENOUS; SUBCUTANEOUS EVERY 6 HOURS PRN
Status: DISCONTINUED | OUTPATIENT
Start: 2023-07-16 | End: 2023-07-18 | Stop reason: HOSPADM

## 2023-07-16 RX ADMIN — Medication 10 ML: at 12:07

## 2023-07-16 RX ADMIN — GABAPENTIN 100 MG: 100 CAPSULE ORAL at 08:07

## 2023-07-16 RX ADMIN — MUPIROCIN: 20 OINTMENT TOPICAL at 08:07

## 2023-07-16 RX ADMIN — FLUCONAZOLE 100 MG: 100 TABLET ORAL at 08:07

## 2023-07-16 RX ADMIN — POTASSIUM CHLORIDE 10 MEQ: 7.46 INJECTION, SOLUTION INTRAVENOUS at 07:07

## 2023-07-16 RX ADMIN — PANTOPRAZOLE SODIUM 40 MG: 40 INJECTION, POWDER, FOR SOLUTION INTRAVENOUS at 08:07

## 2023-07-16 RX ADMIN — IPRATROPIUM BROMIDE AND ALBUTEROL SULFATE 3 ML: 2.5; .5 SOLUTION RESPIRATORY (INHALATION) at 07:07

## 2023-07-16 RX ADMIN — PIPERACILLIN AND TAZOBACTAM 4.5 G: 4; .5 INJECTION, POWDER, LYOPHILIZED, FOR SOLUTION INTRAVENOUS; PARENTERAL at 04:07

## 2023-07-16 RX ADMIN — MIDODRINE HYDROCHLORIDE 5 MG: 5 TABLET ORAL at 08:07

## 2023-07-16 RX ADMIN — OXYCODONE AND ACETAMINOPHEN 1 TABLET: 5; 325 TABLET ORAL at 08:07

## 2023-07-16 RX ADMIN — Medication 10 ML: at 06:07

## 2023-07-16 RX ADMIN — POTASSIUM CHLORIDE 10 MEQ: 7.46 INJECTION, SOLUTION INTRAVENOUS at 11:07

## 2023-07-16 RX ADMIN — IPRATROPIUM BROMIDE AND ALBUTEROL SULFATE 3 ML: 2.5; .5 SOLUTION RESPIRATORY (INHALATION) at 01:07

## 2023-07-16 RX ADMIN — POTASSIUM CHLORIDE 10 MEQ: 7.46 INJECTION, SOLUTION INTRAVENOUS at 09:07

## 2023-07-16 RX ADMIN — VANCOMYCIN HYDROCHLORIDE 500 MG: 500 INJECTION, POWDER, LYOPHILIZED, FOR SOLUTION INTRAVENOUS at 07:07

## 2023-07-16 RX ADMIN — MORPHINE SULFATE 4 MG: 4 INJECTION INTRAVENOUS at 05:07

## 2023-07-16 RX ADMIN — Medication 10 ML: at 05:07

## 2023-07-16 RX ADMIN — POTASSIUM CHLORIDE 10 MEQ: 7.46 INJECTION, SOLUTION INTRAVENOUS at 10:07

## 2023-07-16 RX ADMIN — IPRATROPIUM BROMIDE AND ALBUTEROL SULFATE 3 ML: 2.5; .5 SOLUTION RESPIRATORY (INHALATION) at 12:07

## 2023-07-16 RX ADMIN — POTASSIUM CHLORIDE 10 MEQ: 7.46 INJECTION, SOLUTION INTRAVENOUS at 12:07

## 2023-07-16 NOTE — NURSING
[facility]  Nurses Note -- 4 Eyes      7/15/2023       Skin assessed on: Q Shift      [] No Pressure Injuries Present    []Prevention Measures Documented    [x] Yes LDA  for Pressure Injury Previously documented     [] Yes New Pressure Injury Discovered   [] LDA for New Pressure Injury Added      Attending RN:  Giovanna Sanchez RN     Second RN:  Nazanin Willams RN

## 2023-07-16 NOTE — PLAN OF CARE
Problem: Adult Inpatient Plan of Care  Goal: Plan of Care Review  Outcome: Ongoing, Progressing  Goal: Optimal Comfort and Wellbeing  Outcome: Ongoing, Progressing     Problem: Diabetes Comorbidity  Goal: Blood Glucose Level Within Targeted Range  Outcome: Ongoing, Progressing     Problem: Bleeding (Sepsis/Septic Shock)  Goal: Absence of Bleeding  Outcome: Ongoing, Progressing     Problem: Nutrition Impaired (Sepsis/Septic Shock)  Goal: Optimal Nutrition Intake  Outcome: Ongoing, Progressing     Problem: Infection  Goal: Absence of Infection Signs and Symptoms  Outcome: Ongoing, Progressing     Problem: Fall Injury Risk  Goal: Absence of Fall and Fall-Related Injury  Outcome: Ongoing, Progressing

## 2023-07-16 NOTE — PT/OT/SLP PROGRESS
Occupational Therapy      Patient Name:  Nelsy Marino   MRN:  [id number]    Patient not seen today secondary to hold per nurse; pt w/ significant pain in BLEs this AM and is now finally able to rest/sleep after receiving medications . Will follow-up 7/17/23.    7/16/2023

## 2023-07-16 NOTE — PROGRESS NOTES
Nelsy Marino is a 82 y.o. female patient.    C/o leg pain  Otherwise comfortable    Scheduled Meds:   albuterol-ipratropium  3 mL Nebulization Q6H    fluconazole  100 mg Oral Daily    midodrine  5 mg Oral TID    mupirocin   Nasal BID    pantoprazole  40 mg Intravenous BID    piperacillin-tazobactam (Zosyn) IV (PEDS and ADULTS) (extended infusion is not appropriate)  4.5 g Intravenous Q12H    potassium chloride  10 mEq Intravenous Q1H    potassium chloride  60 mEq Oral Once    sodium chloride 0.9%  10 mL Intravenous Q6H    vancomycin (VANCOCIN) IV (PEDS and ADULTS)  500 mg Intravenous Once       Review of patient's allergies indicates:   Allergen Reactions    Motrin [ibuprofen] Itching         Vital Signs Range (Last 24H):  Temp:  [97.5 °F (36.4 °C)-98.5 °F (36.9 °C)]   Pulse:  [73-82]   Resp:  [14-46]   BP: ()/(39-71)   SpO2:  [92 %-100 %]     I & O (Last 24H):  Intake/Output Summary (Last 24 hours) at 7/16/2023 0829  Last data filed at 7/16/2023 0500  Gross per 24 hour   Intake --   Output 1400 ml   Net -1400 ml           Physical Exam:  General appearance: well developed, no distress  Lungs:  clear to auscultation bilaterally and normal respiratory effort  Heart: regular rate and rhythm  Abdomen:  soft, normal bowel sounds  Extremities: edema trace    Laboratory:  I have reviewed all pertinent lab results within the past 24 hours.  CBC:   Recent Labs   Lab 07/13/23  0950   WBC 10.99   RBC 3.11*   HGB 8.2*   HCT 25.7*      MCV 83   MCH 26.4*   MCHC 31.9*     CMP:   Recent Labs   Lab 07/16/23  0501   GLU 86   CALCIUM 7.7*   ALBUMIN 1.6*   PROT 5.6*   *   K 3.1*   CO2 27   CL 94*   BUN 71*   CREATININE 2.4*   ALKPHOS 77   ALT 11   AST 37   BILITOT 0.4       Assessment & Plan    LOLY - improving  Septic shock - resolving  Urinary retention  Yeast UTI  Anemia    Neurontin 100 mg po tid  Watch renal function closely      Tiffany Murillo  7/16/2023

## 2023-07-16 NOTE — PLAN OF CARE
Remained off of Levophed today.  Complains of pain to left leg.  Potassium supplement given.  Wound care done per wound care recommendations.  Turned and repositioned every 2 hours.  Tolerated small amount of pureed diet.

## 2023-07-16 NOTE — PROGRESS NOTES
[facility]  Progress Note    Patient Name: Nelsy Marino  MRN: [id number]  Patient Class: IP- Inpatient   Admission Date: 7/10/2023  Length of Stay: 6 days  Attending Physician: Jacobo Phillips MD  Primary Care Provider: Juan F Garay        Subjective:     Principal Problem:Septic shock        HPI:  82 year old female with PMHx of HFrEF, COPD, DM, HTN, DVT brought in to the ED by EMS due to change in mental status over the past 3 days. Patient altered and history obtained from ED physician and  at bedside. Patient's  reports that the patient has been confused and has been vomiting and complaining of abdominal pain with poor PO intake and he has noticed decreased urine output as well. Patient has not been ambulating due to a fall and recent amputation of R toes. In the ED, patient noted to be hypoxic requiring 2-4L NC, BNP elevated and CXR with some fluid and possible PNA. Patient started on sepsis protocol due to leukocytosis, low BP, and UTI. Found to have urinary retention and rivas placed. Started on IV abx and admitted to ICU for further management.       Overview/Hospital Course:  Patient admitted to the ICU with sepsis from possible pneumonia/UTI requiring pressor support. Pulmonary consulted for hypoxia which was likely due to pleural effusions, lethargy, volume overload and possible pneumonia  blood cultures were NG. Yeast in urine and started on Diflucan.  Patient with ESRD but patient/ refuse dialysis. Palliative care consulted.  would like to take patient home with hospice. Remains on pressor support. Family wishes to give the patient more time. Now off pressor support       Interval History: Doing better     Review of Systems   Unable to perform ROS: Acuity of condition   Objective:     Vital Signs (Most Recent):  Temp: 97.6 °F (36.4 °C) (07/16/23 0400)  Pulse: 77 (07/16/23 0600)  Resp: (!) 23 (07/16/23 0600)  BP: 104/68 (07/16/23  0600)  SpO2: 99 % (07/16/23 0600) Vital Signs (24h Range):  Temp:  [97.6 °F (36.4 °C)-98.5 °F (36.9 °C)] 97.6 °F (36.4 °C)  Pulse:  [73-81] 77  Resp:  [14-46] 23  SpO2:  [92 %-100 %] 99 %  BP: ()/(39-71) 104/68     Weight: 60.7 kg (133 lb 13.1 oz)  Body mass index is 24.48 kg/m².    Intake/Output Summary (Last 24 hours) at 7/16/2023 0653  Last data filed at 7/16/2023 0500  Gross per 24 hour   Intake --   Output 1400 ml   Net -1400 ml         Physical Exam  Vitals and nursing note reviewed.   Constitutional:       Appearance: She is obese. She is ill-appearing.   Pulmonary:      Effort: Pulmonary effort is normal. No respiratory distress.   Skin:     General: Skin is warm and dry.   Neurological:      Mental Status: She is alert.   Psychiatric:         Behavior: Behavior normal.           Significant Labs: All pertinent labs within the past 24 hours have been reviewed.  BMP:   Recent Labs   Lab 07/16/23  0501   GLU 86   *   K 3.1*   CL 94*   CO2 27   BUN 71*   CREATININE 2.4*   CALCIUM 7.7*     CBC: No results for input(s): WBC, HGB, HCT, PLT in the last 48 hours.    Significant Imaging:       Assessment/Plan:      * Septic shock  - U/A dirty, CXR with possible PNA, BCx in process  - continue broad spectrum abx with IV vanc, zosyn, and azithromycin  - s/p 30 cc/kg of IVF, low dose levophed ordered if needed to maintain MAP > 65    Remains on pressor support. UTI with yeast. Started on diflucan. Pulmonary following for possible pneumonia   On Zosyn and zithromax and Vanc    Source not clear. Remains on 3 abx. Pressors required     Will d/c Abx. Off pressors       Hypokalemia  Will replace      Acute blood loss anemia  Will heme stools. S/p blood with appropriate rise      DNR (do not resuscitate)  Per my discussion with  on 7/11/23      Acute cystitis  - as above    Diflucan for yeast in urine      Urinary retention  - rivas placed with good UOP  - monitor output      Acute hypoxemic respiratory  failure  - as under heart failure  - s/p IVF per sepsis protocol  - on 2-4L NC, wean as tolerated  - covering with abx for possible PNA  - repeat echo      Multifactorial- as per pulmonary    Moderate malnutrition  Nutrition consulted. Most recent weight and BMI monitored-     Measurements:  Wt Readings from Last 1 Encounters:   07/11/23 57.2 kg (126 lb)   Body mass index is 23.05 kg/m².    Patient has been screened and assessed by RD.    Malnutrition Type:  Context:    Level:      Malnutrition Characteristic Summary:       Interventions/Recommendations (treatment strategy):  1.    Debility  PT/OT when appropriate      LOLY (acute kidney injury)  - sCr up from baseline, pt with urinary retention and UTI  - good UOP after rivas placed, monitor I/O  - repeat BMP in AM    Patient refuses dialysis       Palliative care encounter  Family wishes more time on Abx and Pressors.      Diastolic CHF, chronic  - elevated BNP and pt requiring 2-4L of oxygen  - s/p IVF in the ED per sepsis protocol  - repeat echo      Type 2 diabetes mellitus with chronic kidney disease, with long-term current use of insulin  -SSI for now, pt with very poor PO intake      Essential hypertension  -holding all BP meds due to sepsis        VTE Risk Mitigation (From admission, onward)         Ordered     IP VTE HIGH RISK PATIENT  Once         07/10/23 1920     Place sequential compression device  Until discontinued         07/10/23 1920     Place GABE hose  Until discontinued         07/10/23 1920     Place sequential compression device  Until discontinued         07/10/23 1920                Discharge Planning   JUAN ALBERTO:      Code Status: DNR   Is the patient medically ready for discharge?:     Reason for patient still in hospital (select all that apply): Patient unstable  Discharge Plan A: Hospice/home            Critical care time spent on the evaluation and treatment of severe organ dysfunction, review of pertinent labs and imaging studies, discussions  with consulting providers and discussions with patient/family: 35  minutes.      Jacobo Norris MD  Department of Hospital Medicine   [facility] - Intensive Care

## 2023-07-16 NOTE — SUBJECTIVE & OBJECTIVE
Interval History: Doing better     Review of Systems   Unable to perform ROS: Acuity of condition   Objective:     Vital Signs (Most Recent):  Temp: 97.6 °F (36.4 °C) (07/16/23 0400)  Pulse: 77 (07/16/23 0600)  Resp: (!) 23 (07/16/23 0600)  BP: 104/68 (07/16/23 0600)  SpO2: 99 % (07/16/23 0600) Vital Signs (24h Range):  Temp:  [97.6 °F (36.4 °C)-98.5 °F (36.9 °C)] 97.6 °F (36.4 °C)  Pulse:  [73-81] 77  Resp:  [14-46] 23  SpO2:  [92 %-100 %] 99 %  BP: ()/(39-71) 104/68     Weight: 60.7 kg (133 lb 13.1 oz)  Body mass index is 24.48 kg/m².    Intake/Output Summary (Last 24 hours) at 7/16/2023 0653  Last data filed at 7/16/2023 0500  Gross per 24 hour   Intake --   Output 1400 ml   Net -1400 ml         Physical Exam  Vitals and nursing note reviewed.   Constitutional:       Appearance: She is obese. She is ill-appearing.   Pulmonary:      Effort: Pulmonary effort is normal. No respiratory distress.   Skin:     General: Skin is warm and dry.   Neurological:      Mental Status: She is alert.   Psychiatric:         Behavior: Behavior normal.           Significant Labs: All pertinent labs within the past 24 hours have been reviewed.  BMP:   Recent Labs   Lab 07/16/23  0501   GLU 86   *   K 3.1*   CL 94*   CO2 27   BUN 71*   CREATININE 2.4*   CALCIUM 7.7*     CBC: No results for input(s): WBC, HGB, HCT, PLT in the last 48 hours.    Significant Imaging:

## 2023-07-16 NOTE — PROGRESS NOTES
Pharmacokinetic Assessment Follow Up: IV Vancomycin    Vancomycin serum concentration assessment(s):    The random level was drawn correctly and can be used to guide therapy at this time. The measurement is within the desired definitive target range of 10 to 20 mcg/mL.    Vancomycin Regimen Plan:    Dose 500 mg x 1 today 7/16    Re-dose when the random level is less than 20 mcg/mL, next level to be drawn at 0400 on 7/17    Drug levels (last 3 results):  Recent Labs   Lab Result Units 07/14/23  0248 07/15/23  0325 07/16/23  0501   Vancomycin, Random ug/mL 18.8 21.4 15.8       Pharmacy will continue to follow and monitor vancomycin.    Please contact pharmacy at extension [phone] for questions regarding this assessment.    Thank you for the consult,   Rigo Hernandez       Patient brief summary:  Nelsy Marino is a 82 y.o. female initiated on antimicrobial therapy with IV Vancomycin for treatment of sepsis    Drug Allergies:   Review of patient's allergies indicates:   Allergen Reactions    Motrin [ibuprofen] Itching       Actual Body Weight:   60.7 kg    Renal Function:   Estimated Creatinine Clearance: 15.5 mL/min (A) (based on SCr of 2.4 mg/dL (H)).,     Dialysis Method (if applicable):  N/A    CBC (last 72 hours):  Recent Labs   Lab Result Units 07/13/23  0950   WBC K/uL 10.99   Hemoglobin g/dL 8.2*   Hematocrit % 25.7*   Platelets K/uL 332   Gran % % 85.7*   Lymph % % 6.4*   Mono % % 4.5   Eosinophil % % 2.8   Basophil % % 0.1   Differential Method  Automated       Metabolic Panel (last 72 hours):  Recent Labs   Lab Result Units 07/14/23  0248 07/15/23  0855 07/15/23  1705 07/16/23  0501   Sodium mmol/L 130* 131*  --  133*   Potassium mmol/L 3.1* 2.5* 2.9* 3.1*   Chloride mmol/L 89* 89*  --  94*   CO2 mmol/L 25 27  --  27   Glucose mg/dL 176* 118*  --  86   BUN mg/dL 78* 75*  --  71*   Creatinine mg/dL 4.1* 3.3*  --  2.4*   Albumin g/dL  --   --   --  1.6*   Total Bilirubin mg/dL  --   --   --  0.4   Alkaline  Phosphatase U/L  --   --   --  77   AST U/L  --   --   --  37   ALT U/L  --   --   --  11       Vancomycin Administrations:  vancomycin given in the last 96 hours                     vancomycin (VANCOCIN) 500 mg in dextrose 5 % in water (D5W) 5 % 100 mL IVPB (MB+) (mg) 500 mg New Bag 07/14/23 0425    vancomycin (VANCOCIN) 500 mg in dextrose 5 % in water (D5W) 5 % 100 mL IVPB (MB+) (mg) 500 mg New Bag 07/13/23 0435                    Microbiologic Results:  Microbiology Results (last 7 days)       Procedure Component Value Units Date/Time    Blood culture x two cultures. Draw prior to antibiotics. [[id number]] Collected: 07/10/23 1530    Order Status: Completed Specimen: Blood from Peripheral, Antecubital, Right Updated: 07/14/23 1703     Blood Culture, Routine No Growth after 4 days.    Narrative:      Aerobic and anaerobic    Blood culture x two cultures. Draw prior to antibiotics. [[id number]] Collected: 07/10/23 1539    Order Status: Completed Specimen: Blood from Peripheral, Wrist, Left Updated: 07/14/23 1703     Blood Culture, Routine No Growth after 4 days.    Narrative:      Aerobic and anaerobic    Urine culture [[id number]]  (Abnormal) Collected: 07/10/23 1706    Order Status: Completed Specimen: Urine Updated: 07/13/23 1204     Urine Culture, Routine FELI GLABRATA  > 100,000 cfu/ml  Treatment of asymptomatic candiduria is not recommended (except for   specific populations). Candida isolated in the urine typically   represents colonization. If an indwelling urinary catheter is present  it should be removed or replaced.      Narrative:      Specimen Source->Urine

## 2023-07-16 NOTE — NURSING TRANSFER
Nursing Transfer Note      7/16/2023   6:15 PM    Nurse giving handoff:SUSIE Back  Nurse receiving handoff:AprilSUSIE    Reason patient is being transferred: downgraded level of care    Transfer To: 338      Transfer via bed    Transfer with O2, cardiac monitoring    Transported by ICU nursex3    Transfer Vital Signs:  Blood Pressure:111 56  Heart Rate:77  O2:98  Temperature:97.8  Respirations:18    Telemetry: Box Number [id number], Rate 78, Rhythm SR, and Telemetry  Heather  Order for Tele Monitor? Yes    Additional Lines: Oxygen and [place] Catheter    4eyes on Skin: yes    Medicines sent: mupirocin    Any special needs or follow-up needed: none    Patient belongings transferred with patient: Yes    Chart send with patient: Yes    Notified: sisters, niece at the bedside    Patient reassessed at: 7/16/23,1806 (date, time)  1  Upon arrival to floor: cardiac monitor applied, patient oriented to room, call bell in reach, and bed in lowest position

## 2023-07-16 NOTE — PLAN OF CARE
VSS.  Dilaudid and gabapentin added for complaint of severe left leg pain.  Ate very  little today.   Wound care done as per orders. Transferred to tele floor.

## 2023-07-16 NOTE — NURSING TRANSFER
Nursing Transfer Note      7/16/2023   6:13 PM    Nurse giving handoff:[facility] [place]  Nurse receiving handoff: April    Reason patient is being transferred: decrease in level of care    Transfer To: 338 from 275    Transfer via bed    Transfer with  to O2, cardiac monitoring, RN X 3    Transported by bed     Transfer Vital Signs:  Blood Pressure:106/52  Heart Rate:80  O2:3L NC  Temperature:98.2  Respirations:28    Telemetry: sinus rhythm  Order for Tele Monitor? Yes    Additional Lines: Oxygen and [place] Catheter    4eyes on Skin: yes    Medicines sent: yes    Any special needs or follow-up needed: pain control    Patient belongings transferred with patient: Yes, including dentures in denture cup    Chart send with patient: Yes    Notified: sister    Patient reassessed at: 1730 today  1  Upon arrival to floor: cardiac monitor applied, patient oriented to room, call bell in reach, and bed in lowest position

## 2023-07-16 NOTE — CARE UPDATE
Provider Transfer:     Patient admitted to the ICU with sepsis from possible pneumonia/UTI requiring pressor support. Pulmonary consulted for hypoxia which was likely due to pleural effusions, lethargy, volume overload and possible pneumonia  blood cultures were NG. Yeast in urine and started on Diflucan.  Patient with ESRD but patient/ refuse dialysis. Palliative care consulted.  would like to take patient home with hospice. Remains on pressor support. Family wishes to give the patient more time. Now off pressor support and Abx stopped. Patient will be transferred to the floor.     Plan:  Patient has shown better than expected improvement. Initially- she was to go home with home hospice- family now seems not sure. Would discuss with palliative care on 7/17.  Does not want dialysis.

## 2023-07-17 ENCOUNTER — DOCUMENT SCAN (OUTPATIENT)
Dept: HOME HEALTH SERVICES | Facility: HOSPITAL | Age: 83
End: 2023-07-17
Payer: MEDICARE

## 2023-07-17 LAB
ALBUMIN SERPL BCP-MCNC: 1.7 G/DL (ref 3.5–5.2)
ALP SERPL-CCNC: 90 U/L (ref 55–135)
ALT SERPL W/O P-5'-P-CCNC: 15 U/L (ref 10–44)
ANION GAP SERPL CALC-SCNC: 10 MMOL/L (ref 8–16)
AST SERPL-CCNC: 55 U/L (ref 10–40)
BILIRUB SERPL-MCNC: 0.4 MG/DL (ref 0.1–1)
BUN SERPL-MCNC: 57 MG/DL (ref 8–23)
CALCIUM SERPL-MCNC: 8.2 MG/DL (ref 8.7–10.5)
CHLORIDE SERPL-SCNC: 99 MMOL/L (ref 95–110)
CO2 SERPL-SCNC: 28 MMOL/L (ref 23–29)
CREAT SERPL-MCNC: 1.6 MG/DL (ref 0.5–1.4)
EST. GFR  (NO RACE VARIABLE): 32 ML/MIN/1.73 M^2
GLUCOSE SERPL-MCNC: 80 MG/DL (ref 70–110)
POCT GLUCOSE: 74 MG/DL (ref 70–110)
POCT GLUCOSE: 84 MG/DL (ref 70–110)
POCT GLUCOSE: 87 MG/DL (ref 70–110)
POCT GLUCOSE: 89 MG/DL (ref 70–110)
POTASSIUM SERPL-SCNC: 3.5 MMOL/L (ref 3.5–5.1)
PROT SERPL-MCNC: 6 G/DL (ref 6–8.4)
SODIUM SERPL-SCNC: 137 MMOL/L (ref 136–145)

## 2023-07-17 PROCEDURE — 80053 COMPREHEN METABOLIC PANEL: CPT | Performed by: INTERNAL MEDICINE

## 2023-07-17 PROCEDURE — 27000221 HC OXYGEN, UP TO 24 HOURS

## 2023-07-17 PROCEDURE — 63700000 PHARM REV CODE 250 ALT 637 W/O HCPCS: Performed by: INTERNAL MEDICINE

## 2023-07-17 PROCEDURE — 97530 THERAPEUTIC ACTIVITIES: CPT

## 2023-07-17 PROCEDURE — C9113 INJ PANTOPRAZOLE SODIUM, VIA: HCPCS | Performed by: INTERNAL MEDICINE

## 2023-07-17 PROCEDURE — 97161 PT EVAL LOW COMPLEX 20 MIN: CPT

## 2023-07-17 PROCEDURE — 25000242 PHARM REV CODE 250 ALT 637 W/ HCPCS: Performed by: INTERNAL MEDICINE

## 2023-07-17 PROCEDURE — 97165 OT EVAL LOW COMPLEX 30 MIN: CPT

## 2023-07-17 PROCEDURE — 21400001 HC TELEMETRY ROOM

## 2023-07-17 PROCEDURE — 25000003 PHARM REV CODE 250: Performed by: INTERNAL MEDICINE

## 2023-07-17 PROCEDURE — 94760 N-INVAS EAR/PLS OXIMETRY 1: CPT

## 2023-07-17 PROCEDURE — 63600175 PHARM REV CODE 636 W HCPCS: Performed by: INTERNAL MEDICINE

## 2023-07-17 PROCEDURE — 94640 AIRWAY INHALATION TREATMENT: CPT

## 2023-07-17 PROCEDURE — A4216 STERILE WATER/SALINE, 10 ML: HCPCS | Performed by: INTERNAL MEDICINE

## 2023-07-17 RX ADMIN — GABAPENTIN 100 MG: 100 CAPSULE ORAL at 03:07

## 2023-07-17 RX ADMIN — Medication 10 ML: at 12:07

## 2023-07-17 RX ADMIN — PANTOPRAZOLE SODIUM 40 MG: 40 INJECTION, POWDER, FOR SOLUTION INTRAVENOUS at 09:07

## 2023-07-17 RX ADMIN — IPRATROPIUM BROMIDE AND ALBUTEROL SULFATE 3 ML: 2.5; .5 SOLUTION RESPIRATORY (INHALATION) at 01:07

## 2023-07-17 RX ADMIN — GABAPENTIN 100 MG: 100 CAPSULE ORAL at 09:07

## 2023-07-17 RX ADMIN — IPRATROPIUM BROMIDE AND ALBUTEROL SULFATE 3 ML: 2.5; .5 SOLUTION RESPIRATORY (INHALATION) at 07:07

## 2023-07-17 RX ADMIN — PANTOPRAZOLE SODIUM 40 MG: 40 INJECTION, POWDER, FOR SOLUTION INTRAVENOUS at 08:07

## 2023-07-17 RX ADMIN — IPRATROPIUM BROMIDE AND ALBUTEROL SULFATE 3 ML: 2.5; .5 SOLUTION RESPIRATORY (INHALATION) at 08:07

## 2023-07-17 RX ADMIN — GABAPENTIN 100 MG: 100 CAPSULE ORAL at 08:07

## 2023-07-17 RX ADMIN — FLUCONAZOLE 100 MG: 100 TABLET ORAL at 09:07

## 2023-07-17 RX ADMIN — Medication 10 ML: at 04:07

## 2023-07-17 RX ADMIN — Medication 10 ML: at 01:07

## 2023-07-17 RX ADMIN — OXYCODONE AND ACETAMINOPHEN 1 TABLET: 5; 325 TABLET ORAL at 08:07

## 2023-07-17 RX ADMIN — MIDODRINE HYDROCHLORIDE 5 MG: 5 TABLET ORAL at 08:07

## 2023-07-17 RX ADMIN — Medication 10 ML: at 06:07

## 2023-07-17 NOTE — PLAN OF CARE
Problem: Physical Therapy  Goal: Physical Therapy Goal  Description: Goals to be met by: 23     Patient will increase functional independence with mobility by performin. Pt to be min A with bed mobility.  2. Pt to transfer with mod A.  3. Pt/family to be (I) with HEP.    Outcome: Ongoing, Progressing   Initial eval completed, see in chart for details.

## 2023-07-17 NOTE — NURSING
[facility] [place]  Nurses Note -- 4 Eyes      7/17/2023       Skin assessed on: Q Shift      [] No Pressure Injuries Present    []Prevention Measures Documented    [x] Yes LDA  for Pressure Injury Previously documented     [] Yes New Pressure Injury Discovered   [] LDA for New Pressure Injury Added      Attending RN:  Inna Torres RN     Second RN:  Thalia RICHARDS

## 2023-07-17 NOTE — PROGRESS NOTES
[facility] Medicine  Progress Note    Patient Name: Nelsy Marino  MRN: [id number]  Patient Class: IP- Inpatient   Admission Date: 7/10/2023  Length of Stay: 7 days  Attending Physician: Hector Kuo MD  Primary Care Provider: [facility]        Subjective:     Principal Problem:Septic shock        HPI:  82 year old female with PMHx of HFrEF, COPD, DM, HTN, DVT brought in to the ED by EMS due to change in mental status over the past 3 days. Patient altered and history obtained from ED physician and  at bedside. Patient's  reports that the patient has been confused and has been vomiting and complaining of abdominal pain with poor PO intake and he has noticed decreased urine output as well. Patient has not been ambulating due to a fall and recent amputation of R toes. In the ED, patient noted to be hypoxic requiring 2-4L NC, BNP elevated and CXR with some fluid and possible PNA. Patient started on sepsis protocol due to leukocytosis, low BP, and UTI. Found to have urinary retention and rivas placed. Started on IV abx and admitted to ICU for further management.       Overview/Hospital Course:  Patient admitted to the ICU with sepsis from possible pneumonia/UTI requiring pressor support. Pulmonary consulted for hypoxia which was likely due to pleural effusions, lethargy, volume overload and possible pneumonia  blood cultures were NG. Yeast in urine and started on Diflucan.  Patient with ESRD but patient/ refuse dialysis. Palliative care consulted.  would like to take patient home with hospice. Remains on pressor support. Family wishes to give the patient more time. Now off pressor support     Patient's kidney function improving, urine output improved, does not appear to need dialysis anymore.  Family appears to be less interested in hospice at this point.  Potentially home with home health tomorrow. Voiding trail today.       Interval History:  NAEON.  No new  issues.   Denies complaints. But ready to go home soon.  On home O2, recently started  All questions answered and updates on care given.       ROS:  General: Negative for fevers or chills.  Cardiac: Negative for chest pain or orthopnea   Pulmonary: Negative for dyspnea or wheezing.  GI: Negative for abdominal distention or pain     Vitals:    07/17/23 0135 07/17/23 0434 07/17/23 0817 07/17/23 0833   BP:  135/63 (!) 144/63    BP Location:  Right arm     Patient Position:  Lying Lying    Pulse: 82 87 86 89   Resp: 20 19 17 16   Temp:  97.9 °F (36.6 °C) 98.5 °F (36.9 °C)    TempSrc:  Oral Oral    SpO2:  (!) 93% 98% 97%   Weight:  64.3 kg (141 lb 12.1 oz)     Height:              Body mass index is 25.93 kg/m².      PHYSICAL EXAM:  GENERAL APPEARANCE: alert and cooperative, and appears to be in no acute distress.  HEENT:     HEAD: NC/AT     EYES: PERRL, EOMI.  Vision is grossly intact.  NECK: Neck supple, non-tender without LAD, masses or thyromegaly.  CARDIAC: There is no peripheral edema, cyanosis or pallor.   LUNGS: Clear to auscultation and percussion without rales or wheezing  ABDOMEN: Non-distended. No guarding.  MSK: No joint erythema or tenderness.   EXTREMITIES: No significant deformity or joint abnormality. No edema.   NEUROLOGICAL: CN II-XII grossly intact.   SKIN: Skin normal color, texture and turgor with no lesions or eruptions.  PSYCHIATRIC: Oriented. No tangential speech. No Hyperactive features.        Recent Results (from the past 24 hour(s))   POCT glucose    Collection Time: 07/16/23 11:26 AM   Result Value Ref Range    POCT Glucose 89 70 - 110 mg/dL   POCT glucose    Collection Time: 07/16/23  4:13 PM   Result Value Ref Range    POCT Glucose 79 70 - 110 mg/dL   POCT glucose    Collection Time: 07/16/23  8:12 PM   Result Value Ref Range    POCT Glucose 82 70 - 110 mg/dL   Comprehensive Metabolic Panel    Collection Time: 07/17/23  4:48 AM   Result Value Ref Range    Sodium 137 136 - 145 mmol/L     Potassium 3.5 3.5 - 5.1 mmol/L    Chloride 99 95 - 110 mmol/L    CO2 28 23 - 29 mmol/L    Glucose 80 70 - 110 mg/dL    BUN 57 (H) 8 - 23 mg/dL    Creatinine 1.6 (H) 0.5 - 1.4 mg/dL    Calcium 8.2 (L) 8.7 - 10.5 mg/dL    Total Protein 6.0 6.0 - 8.4 g/dL    Albumin 1.7 (L) 3.5 - 5.2 g/dL    Total Bilirubin 0.4 0.1 - 1.0 mg/dL    Alkaline Phosphatase 90 55 - 135 U/L    AST 55 (H) 10 - 40 U/L    ALT 15 10 - 44 U/L    eGFR 32 (A) >60 mL/min/1.73 m^2    Anion Gap 10 8 - 16 mmol/L   POCT glucose    Collection Time: 07/17/23  8:00 AM   Result Value Ref Range    POCT Glucose 74 70 - 110 mg/dL       Microbiology Results (last 7 days)     Procedure Component Value Units Date/Time    Blood culture x two cultures. Draw prior to antibiotics. [[id number]] Collected: 07/10/23 1530    Order Status: Completed Specimen: Blood from Peripheral, Antecubital, Right Updated: 07/14/23 1703     Blood Culture, Routine No Growth after 4 days.    Narrative:      Aerobic and anaerobic    Blood culture x two cultures. Draw prior to antibiotics. [[id number]] Collected: 07/10/23 1539    Order Status: Completed Specimen: Blood from Peripheral, Wrist, Left Updated: 07/14/23 1703     Blood Culture, Routine No Growth after 4 days.    Narrative:      Aerobic and anaerobic    Urine culture [[id number]]  (Abnormal) Collected: 07/10/23 1706    Order Status: Completed Specimen: Urine Updated: 07/13/23 1204     Urine Culture, Routine FELI GLABRATA  > 100,000 cfu/ml  Treatment of asymptomatic candiduria is not recommended (except for   specific populations). Candida isolated in the urine typically   represents colonization. If an indwelling urinary catheter is present  it should be removed or replaced.      Narrative:      Specimen Source->Urine           Imaging Results          X-Ray Chest AP Portable (Final result)  Result time 07/10/23 16:57:04    Final result by Peggy Jones MD (07/10/23 16:57:04)                 Impression:      As  above      Electronically signed by: Peggy Jones MD  Date:    07/10/2023  Time:    16:57             Narrative:    EXAMINATION:  XR CHEST AP PORTABLE    CLINICAL HISTORY:  Sepsis;    TECHNIQUE:  Single frontal view of the chest was performed.    COMPARISON:  June 20, 2023 radiograph and CT June 21, 2023    FINDINGS:  Patient is rotated.  Cardiac size is prominent, similar to prior imaging.  Slight blunting of the lateral costophrenic angles bilaterally noted which may reflect a small amount of pleural fluid.  Lungs demonstrate underinflation.  There are increased interstitial markings with some superimposed bibasilar opacities which may reflect mild edema, overall findings are fairly similar to the previous imaging.  The osseous structures demonstrate degenerative changes.                               CT Head Without Contrast (Final result)  Result time 07/10/23 16:32:41    Final result by Rambo Moore DO (07/10/23 16:32:41)                 Impression:      No acute intracranial abnormality.      Electronically signed by: Rambo Moore  Date:    07/10/2023  Time:    16:32             Narrative:    EXAMINATION:  CT HEAD WITHOUT CONTRAST    CLINICAL HISTORY:  Mental status change, unknown cause; Altered mental status, unspecified    TECHNIQUE:  Low dose axial CT images obtained throughout the head without intravenous contrast. Sagittal and coronal reconstructions were performed.    COMPARISON:  CT head from 04/20/2023.    FINDINGS:  Mild motion and streak artifact noted.  Ventricles and sulci are normal in size for age without evidence of hydrocephalus. No extra-axial blood or fluid collections.  There is hypoattenuation in the supratentorial white matter compatible chronic microvascular ischemic changes, stable.  No parenchymal mass, hemorrhage, edema or major vascular distribution infarct.    No calvarial fracture.  The scalp is unremarkable.  Bilateral paranasal sinuses and mastoid air cells are clear.                                 CT Abdomen Pelvis  Without Contrast (Final result)  Result time 07/10/23 16:37:15    Final result by Kenny Gipson MD (07/10/23 16:37:15)                 Impression:      This report was flagged in Epic as abnormal.    1. Motion artifact limits evaluation of the abdomen pelvis.  2. Bilateral small pleural effusions, new since the previous exam, correlation with volume status advised.  3. The gallbladder is distended noting cholelithiasis.  Correlation with any right upper quadrant tenderness, further evaluation with ultrasound as warranted.  4. There are a few mildly fluid distended small bowel loops in the mid abdomen, nonspecific, early enteritis can present in this fashion.  Correlation recommended.  5. The urinary bladder is distended, correlation with any history of urinary retention or outlet obstruction.  6. Findings suggesting gastroesophageal reflux.  7. Please see above for several additional findings.      Electronically signed by: Kenny Gipson MD  Date:    07/10/2023  Time:    16:37             Narrative:    EXAMINATION:  CT ABDOMEN PELVIS WITHOUT CONTRAST    CLINICAL HISTORY:  Nausea/vomiting;    TECHNIQUE:  Low dose axial images, sagittal and coronal reformations were obtained from the lung bases to the pubic symphysis.  Oral contrast was not administered.    COMPARISON:  CTA 03/29/2023    FINDINGS:  Images of the lower thorax are remarkable for small bilateral pleural effusions.  There is associated compressive atelectasis of the bilateral lower lobes/scarring.  The pleural effusions are new since examination 03/29/2023.  There is a small pericardial effusion.    Allowing for motion artifact, the liver, spleen and adrenal glands are grossly unremarkable.  There is atrophic change of the pancreas without pancreatic ductal dilation.  The gallbladder is distended noting cholelithiasis.  There is prominence of the common duct measuring 8 mm, no convincing intraluminal  filling defect to suggest choledocholithiasis.  The stomach is mildly distended with ingested liquid, no wall thickening.  There is a small amount of fluid in the distal esophagus suggesting sequela of gastroesophageal reflux.  There are a few scattered shotty abdominal lymph nodes.    There is no hydronephrosis or nephrolithiasis.  There is a low attenuating lesion within the interpolar region of the left kidney measuring 1.6 cm, attenuation of which suggests cyst.  The bilateral ureters are unable to be followed in their entirety to the urinary bladder, no definite calculi seen or secondary findings to suggest obstructive uropathy.  The urinary bladder is distended without wall thickening.  The uterus and adnexa are unremarkable.  No significant free fluid in the pelvis.    There is moderate stool in the right colon.  The terminal ileum and appendix are unremarkable.  There is slow flow through several distal small bowel loops noting mild fluid distension.  No small bowel wall thickening or significant inflammation.  There is extensive atherosclerotic calcification of the aorta and its branches.  There are several scattered shotty periaortic, pericaval, and mesenteric lymph nodes.    There are degenerative changes of the right femoroacetabular joint/AVN.  There is osteopenia.  There are degenerative changes of the spine.  No significant inguinal lymphadenopathy.  There is surgical change of fem-fem bypass.                                       Assessment/Plan:      * Septic shock  - U/A dirty, CXR with possible PNA, BCx in process  - continue broad spectrum abx with IV vanc, zosyn, and azithromycin  - s/p 30 cc/kg of IVF, low dose levophed ordered if needed to maintain MAP > 65    Remains on pressor support. UTI with yeast. Started on diflucan. Pulmonary following for possible pneumonia   On Zosyn and zithromax and Vanc    Source not clear. Remains on 3 abx. Pressors required     Will d/c Abx. Off pressors        Acute blood loss anemia  Will heme stools. S/p blood with appropriate rise      DNR (do not resuscitate)  Per my discussion with  on 7/11/23      Acute cystitis  - as above    Diflucan for yeast in urine      Urinary retention  - rivas placed with good UOP  - monitor output      Acute hypoxemic respiratory failure  - as under heart failure  - s/p IVF per sepsis protocol  - on 2-4L NC, wean as tolerated  - covering with abx for possible PNA  - repeat echo      Multifactorial- as per pulmonary    Hypokalemia  Will replace      Moderate malnutrition  Nutrition consulted. Most recent weight and BMI monitored-     Measurements:  Wt Readings from Last 1 Encounters:   07/11/23 57.2 kg (126 lb)   Body mass index is 23.05 kg/m².    Patient has been screened and assessed by RD.    Malnutrition Type:  Context:    Level:      Malnutrition Characteristic Summary:       Interventions/Recommendations (treatment strategy):  1.    Debility  PT/OT when appropriate      LOLY (acute kidney injury)  - sCr up from baseline, pt with urinary retention and UTI  - good UOP after rivas placed, monitor I/O  - repeat BMP in AM    Patient refuses dialysis       Palliative care encounter  Family wishes more time on Abx and Pressors.      Diastolic CHF, chronic  - elevated BNP and pt requiring 2-4L of oxygen  - s/p IVF in the ED per sepsis protocol  - repeat echo      Type 2 diabetes mellitus with chronic kidney disease, with long-term current use of insulin  -SSI for now, pt with very poor PO intake      Essential hypertension  -holding all BP meds due to sepsis        VTE Risk Mitigation (From admission, onward)         Ordered     IP VTE HIGH RISK PATIENT  Once         07/10/23 1920     Place GABE hose  Until discontinued         07/10/23 1920     Place sequential compression device  Until discontinued         07/10/23 1920                Discharge Planning   JUAN ALBERTO:      Code Status: DNR   Is the patient medically ready for discharge?:      Reason for patient still in hospital (select all that apply): Patient trending condition and Treatment  Discharge Plan A: Hospice/home                  Hector Kuo MD  Department of Hospital Medicine   [facility]

## 2023-07-17 NOTE — PLAN OF CARE
Pt will resume home health services with [facility]. No new needs  identified. CM will follow up.     07/17/23 1305   Discharge Reassessment   Assessment Type Discharge Planning Reassessment   Did the patient's condition or plan change since previous assessment? Yes   Discharge Plan discussed with: Patient   Discharge Plan A Home Health   Discharge Plan B Home with family   DME Needed Upon Discharge  other (see comments)  (tbd)   Transition of Care Barriers None   Why the patient remains in the hospital Requires continued medical care   Post-Acute Status   Post-Acute Authorization Home Health   Home Health Status Set-up Complete/Auth obtained   Coverage [employer]   Discharge Delays None known at this time

## 2023-07-17 NOTE — NURSING
[facility] [place]  Nurses Note -- 4 Eyes      7/17/2023       Skin assessed on: Q Shift      [] No Pressure Injuries Present    []Prevention Measures Documented    [x] Yes LDA  for Pressure Injury Previously documented     [] Yes New Pressure Injury Discovered   [] LDA for New Pressure Injury Added      Attending RN:  Osorio Esquivel RN     Second RN:  Inna DAVE RN

## 2023-07-17 NOTE — PT/OT/SLP EVAL
"Occupational Therapy   Evaluation    Name: Nelsy Marino  MRN: [id number]  Admitting Diagnosis: Septic shock  Recent Surgery: * No surgery found *      Recommendations:     Discharge Recommendations: home health OT (w/ 24 hr suprvision)  Discharge Equipment Recommendations:  to be determined by next level of care  Barriers to discharge:   (Pt required increased level of assistance w/ ADLs.)    Assessment:     Nelsy Marino is a 82 y.o. female with a medical diagnosis of Septic shock.  Performance deficits affecting function: weakness, impaired endurance, impaired self care skills, impaired functional mobility, impaired balance, decreased upper extremity function, decreased lower extremity function, decreased coordination, decreased ROM, decreased safety awareness, pain, impaired skin.      Pt pleasant and willing to participate in tx session this date despite complaints of BLE pain. At baseline, pt wears B [employer] shoes due to hx of foot wounds; pt require max A to perform supine>sit to complete squat pivot transfer to chair w/ total A. Pt tolerated tx session fairly and will continue to benefit from skilled acute OT services to maximize functional capacity for safe performance w/ ADLs and functional mobility.     Rehab Prognosis: Fair; patient would benefit from acute skilled OT services to address these deficits and reach maximum level of function.       Plan:     Patient to be seen  (3-5x/wk) to address the above listed problems via self-care/home management, therapeutic activities, therapeutic exercises  Plan of Care Expires: 07/31/23  Plan of Care Reviewed with: patient    Subjective     Chief Complaint: Pain in BLEs   Patient/Family Comments/goals: "I feel good sitting up."    Occupational Profile:  Living Environment: Pt lives with her  in a [facility] with no steps to enter.  Previous level of function: Per spouse, pt remained mostly in bed or in her W/C; very minimal ambulation in the home.   Roles and " Routines: None stated   Equipment Used at Home: wheelchair,rolling walker, bedside commode, shower chair, and hospital bed  Assistance upon Discharge: Spouse; family     Pain/Comfort:  Pain Rating 1: 10/10  Location - Side 1: Bilateral  Location 1: leg  Pain Addressed 1: Reposition, Distraction, Cessation of Activity    Patients cultural, spiritual, Orthodoxy conflicts given the current situation: no    Objective:     Communicated with: Nurse prior to session.  Patient found left sidelying with rivsa catheter, oxygen, telemetry, PICC line upon OT entry to room.    General Precautions: Standard, fall  Orthopedic Precautions: N/A  Braces: N/A  Respiratory Status: Nasal cannula, flow 3 L/min    Occupational Performance:    Bed Mobility:    Patient completed Scooting hips to EOB with maximal assistance and 2 persons  Patient completed Supine to Sit with maximal assistance and HOB elevated     Functional Mobility/Transfers:  Patient completed Bed <> Chair Transfer using Squat Pivot technique with total assistance x 2 persons with no assistive device    Activities of Daily Living:  Upper Body Dressing: moderate assistance for donning gown over back while sitting EOB     Cognitive/Visual Perceptual:  Cognitive/Psychosocial Skills:     -       Oriented to: Person and Place   -       Follows Commands/attention:Follows two-step commands  -       Communication: clear/fluent  -       Memory: No Deficits noted  -       Safety awareness/insight to disability: mildly impaired     Physical Exam:  Balance:    -       poor sitting balance  Postural examination/scapula alignment:    -       Rounded shoulders  -       Forward head  Skin integrity: pt w/ dressing to BLEs   Edema:  None noted  Sensation:    -       Intact  Upper Extremity Range of Motion:     -       Right Upper Extremity: WFL  -       Left Upper Extremity: WFL  Upper Extremity Strength:    -       Right Upper Extremity: WFL  -       Left Upper Extremity: WFL    Strength:    -       Right Upper Extremity: WFL  -       Left Upper Extremity: WFL    AMPAC 6 Click ADL:  AMPA Total Score: 17    Treatment & Education:  -Initial eval complete.   -Pt educated on role of OT and POC.   -Pt educated on safe functional mobility and ADL performance.   -Questions and concerns addressed within scope.     Patient left up in chair with all lines intact, call button in reach, family  present, and nurse    GOALS:   Multidisciplinary Problems       Occupational Therapy Goals          Problem: Occupational Therapy    Goal Priority Disciplines Outcome Interventions   Occupational Therapy Goal     OT, PT/OT Ongoing, Progressing    Description: Goals to be met by: 7/31/23     Patient will increase functional independence with ADLs by performing:    Grooming while EOB with Stand-by Assistance.  Toileting from bedside commode with Minimal Assistance for hygiene and clothing management.   Supine to sit with Minimal Assistance.  Stand pivot transfers with Minimal Assistance.  Step transfer with Minimal Assistance  Toilet transfer to bedside commode with Minimal Assistance.  Upper extremity exercise program x15 reps per handout, with independence.                         History:     Past Medical History:   Diagnosis Date    CHF (congestive heart failure)     Diabetes mellitus     DVT (deep venous thrombosis)     Hypertension     Miscarriage     On home oxygen therapy     PRN         Past Surgical History:   Procedure Laterality Date     femoral vascular surgery       ANGIOGRAPHY OF LOWER EXTREMITY Right 2/23/2023    Procedure: Angiogram Extremity Unilateral;  Surgeon: Danny Dunbar MD;  Location: [facility] OR;  Service: Vascular;  Laterality: Right;  RN PHONE PREOP 2/20/2023----CK CONSENT    ANGIOGRAPHY OF LOWER EXTREMITY Right 4/4/2023    Procedure: Angiogram Extremity Unilateral - possible endovascular intervention;  Surgeon: René Simms MD;  Location: [facility] OR [address] [place] [address];  Service: Vascular;   Laterality: Right;  19.6 mins.  740.81 mGy  73.3399 Gy.cm  58ml dye  local - 1ml    COLONOSCOPY N/A 5/18/2023    Procedure: COLONOSCOPY;  Surgeon: Cuong Aguiar MD;  Location: [facility] ENDO;  Service: Endoscopy;  Laterality: N/A;    CREATION OF FEMORAL-FEMORAL ARTERY BYPASS WITH GRAFT Bilateral 3/2/2023    Procedure: CREATION, BYPASS, ARTERIAL, FEMORAL TO FEMORAL, USING GRAFT, LEFT FEMORAL ENDARTERECTOMY, LEFT ILIAC ARTERY STENT PLACEMENT, RIGHT LOWER EXTREMITY ANGIOGRAM AND RIGHT SFA  ANGIOPLASTY;  Surgeon: Danny Dunbar MD;  Location: [facility] OR 2ND FLR;  Service: Vascular;  Laterality: Bilateral;   744.47 MGY  175.36 Gycm  Flouro time 20.5 mins      CREATION OF ILIOFEMORAL ARTERY BYPASS Right 3/20/2019    Procedure: CREATION, BYPASS, ARTERIAL, ILIAC TO FEMORAL, RIGHT LOWER EXTREMITY, RIGHT PROFUNDAPLASTY;  Surgeon: Danny Dunbar MD;  Location: [facility] OR;  Service: Vascular;  Laterality: Right;  11:00AM START PER NAZANIN RICHARDS PREOP 3/13/2019  ACT'S, CELL SAVER, GRAFTS, BOOK WATER---NEED H/P  CONSENT IN AM------HGBA1C    CREATION OF MUSCLE ROTATIONAL FLAP Left 3/31/2023    Procedure: CREATION, FLAP, MUSCLE ROTATION;  Surgeon: Brandon Smiley MD;  Location: [facility] OR 2ND FLR;  Service: Plastics;  Laterality: Left;    ENDOSCOPY OF PROXIMAL SMALL INTESTINE N/A 5/18/2023    Procedure: ENTEROSCOPY, PROXIMAL;  Surgeon: Cuong Aguiar MD;  Location: [facility] ENDO;  Service: Endoscopy;  Laterality: N/A;    ESOPHAGOGASTRODUODENOSCOPY N/A 3/17/2023    Procedure: EGD (ESOPHAGOGASTRODUODENOSCOPY);  Surgeon: Gee Rodriguez MD;  Location: [facility] ENDO (2ND FLR);  Service: Endoscopy;  Laterality: N/A;    ESOPHAGOGASTRODUODENOSCOPY N/A 4/11/2023    Procedure: EGD (ESOPHAGOGASTRODUODENOSCOPY);  Surgeon: aNzanin Kaiser MD;  Location: [facility] ENDO (2ND FLR);  Service: Endoscopy;  Laterality: N/A;    ESOPHAGOGASTRODUODENOSCOPY N/A 4/26/2023    Procedure: EGD (ESOPHAGOGASTRODUODENOSCOPY);  Surgeon: Gee LOREDO  MD Michael;  [facility]  Service: Endoscopy;  Laterality: N/A;    ESOPHAGOGASTRODUODENOSCOPY N/A 5/2/2023    Procedure: EGD (ESOPHAGOGASTRODUODENOSCOPY);  Surgeon: Cuong Conteh MD;  [facility]  Service: Endoscopy;  Laterality: N/A;    FOOT AMPUTATION THROUGH METATARSAL Right 3/4/2023    Procedure: AMPUTATION, FOOT, TRANSMETATARSAL;  Surgeon: Benjamin Godfrey DPM;  [facility];  Service: Podiatry;  Laterality: Right;    HYSTERECTOMY      ?unsure cervix present    INCISION AND DRAINAGE Right 4/12/2019    Procedure: Incision and Drainage - R groin washout, possible muscle flap;  Surgeon: Danny Dunbar MD;  [facility];  Service: Vascular;  Laterality: Right;  groin washout    INCISION AND DRAINAGE OF GROIN Right 5/3/2019    Procedure: Incision and Drainage R groin;  Surgeon: Danny Dunbar MD;  [address]  Service: Vascular;  Laterality: Right;    WOUND DEBRIDEMENT Left 3/31/2023    Procedure: DEBRIDEMENT, WOUND;  Surgeon: René Simms MD;  [facility];  Service: Vascular;  Laterality: Left;       Time Tracking:     OT Date of Treatment: 07/17/23  OT Start Time: 0941  OT Stop Time: 0957  OT Total Time (min): 16 min    Billable Minutes:Evaluation 16  Total Time 16 (co-eval w/ PT)     7/17/2023

## 2023-07-17 NOTE — PLAN OF CARE
Problem: Adult Inpatient Plan of Care  Goal: Plan of Care Review  Outcome: Ongoing, Progressing  Goal: Patient-Specific Goal (Individualized)  Outcome: Ongoing, Progressing  Goal: Absence of Hospital-Acquired Illness or Injury  Outcome: Ongoing, Progressing  Goal: Optimal Comfort and Wellbeing  Outcome: Ongoing, Progressing  Goal: Readiness for Transition of Care  Outcome: Ongoing, Progressing     Problem: Diabetes Comorbidity  Goal: Blood Glucose Level Within Targeted Range  Outcome: Ongoing, Progressing     Problem: Adjustment to Illness (Sepsis/Septic Shock)  Goal: Optimal Coping  Outcome: Ongoing, Progressing     Problem: Bleeding (Sepsis/Septic Shock)  Goal: Absence of Bleeding  Outcome: Ongoing, Progressing     Problem: Glycemic Control Impaired (Sepsis/Septic Shock)  Goal: Blood Glucose Level Within Desired Range  Outcome: Ongoing, Progressing     Problem: Infection Progression (Sepsis/Septic Shock)  Goal: Absence of Infection Signs and Symptoms  Outcome: Ongoing, Progressing     Problem: Nutrition Impaired (Sepsis/Septic Shock)  Goal: Optimal Nutrition Intake  Outcome: Ongoing, Progressing     Problem: Fluid and Electrolyte Imbalance (Acute Kidney Injury/Impairment)  Goal: Fluid and Electrolyte Balance  Outcome: Ongoing, Progressing     Problem: Oral Intake Inadequate (Acute Kidney Injury/Impairment)  Goal: Optimal Nutrition Intake  Outcome: Ongoing, Progressing     Problem: Renal Function Impairment (Acute Kidney Injury/Impairment)  Goal: Effective Renal Function  Outcome: Ongoing, Progressing     Problem: Impaired Wound Healing  Goal: Optimal Wound Healing  Outcome: Ongoing, Progressing     Problem: Skin Injury Risk Increased  Goal: Skin Health and Integrity  Outcome: Ongoing, Progressing     Problem: Infection  Goal: Absence of Infection Signs and Symptoms  Outcome: Ongoing, Progressing     Problem: Coping Ineffective  Goal: Effective Coping  Outcome: Ongoing, Progressing     Problem: Fall Injury  Risk  Goal: Absence of Fall and Fall-Related Injury  Outcome: Ongoing, Progressing

## 2023-07-17 NOTE — PLAN OF CARE
Problem: Occupational Therapy  Goal: Occupational Therapy Goal  Description: Goals to be met by: 7/31/23     Patient will increase functional independence with ADLs by performing:    Grooming while EOB with Stand-by Assistance.  Toileting from bedside commode with Minimal Assistance for hygiene and clothing management.   Supine to sit with Minimal Assistance.  Stand pivot transfers with Minimal Assistance.  Step transfer with Minimal Assistance  Toilet transfer to bedside commode with Minimal Assistance.  Upper extremity exercise program x15 reps per handout, with independence.    Outcome: Ongoing, Progressing

## 2023-07-17 NOTE — NURSING
[facility]  Nurses Note -- 4 Eyes      7/17/2023       Skin assessed on: Q Shift      [] No Pressure Injuries Present    []Prevention Measures Documented    [x] Yes LDA  for Pressure Injury Previously documented     [] Yes New Pressure Injury Discovered   [] LDA for New Pressure Injury Added      Attending RN:  Osorio Esquivel RN     Second RN:  Inna DAVE RN

## 2023-07-17 NOTE — PROGRESS NOTES
Date of Admission:7/10/2023    SUBJECTIVE:resting comfortably in chair    Current Facility-Administered Medications   Medication    0.9%  NaCl infusion (for blood administration)    acetaminophen tablet 650 mg    albuterol-ipratropium 2.5 mg-0.5 mg/3 mL nebulizer solution 3 mL    dextrose 50% injection 12.5 g    dextrose 50% injection 25 g    fluconazole tablet 100 mg    gabapentin capsule 100 mg    glucagon (human recombinant) injection 1 mg    glucose chewable tablet 16 g    glucose chewable tablet 24 g    HYDROmorphone injection 1 mg    insulin aspart U-100 pen 0-5 Units    melatonin tablet 6 mg    midodrine tablet 5 mg    oxyCODONE-acetaminophen 5-325 mg per tablet 1 tablet    pantoprazole injection 40 mg    potassium chloride SA CR tablet 60 mEq    promethazine (PHENERGAN) 12.5 mg in dextrose 5 % (D5W) 50 mL IVPB    senna-docusate 8.6-50 mg per tablet 1 tablet    sodium chloride 0.9% flush 10 mL    sodium chloride 0.9% flush 10 mL       Wt Readings from Last 3 Encounters:   07/17/23 64.3 kg (141 lb 12.1 oz)   06/28/23 60.3 kg (132 lb 15 oz)   06/24/23 60.3 kg (132 lb 15 oz)     Temp Readings from Last 3 Encounters:   07/17/23 98.4 °F (36.9 °C) (Oral)   06/26/23 98.3 °F (36.8 °C)   06/08/23 98.2 °F (36.8 °C) (Oral)     BP Readings from Last 3 Encounters:   07/17/23 (!) 127/59   06/26/23 (!) 117/58   06/13/23 (!) 126/44     Pulse Readings from Last 3 Encounters:   07/17/23 96   06/26/23 87   06/13/23 64       Intake/Output Summary (Last 24 hours) at 7/17/2023 1756  Last data filed at 7/17/2023 1752  Gross per 24 hour   Intake --   Output 1100 ml   Net -1100 ml       PE:  GEN:wd female in nad  HEENT:ncat,eomi,mm  CVS:s1s2 regualr  PULM:diminished  ABD:bs,soft  EXT:no leg edema  NEURO:awake    Recent Labs   Lab 07/17/23  0448   GLU 80      K 3.5   CL 99   CO2 28   BUN 57*   CREATININE 1.6*   CALCIUM 8.2*       Lab Results   Component Value Date    PTH 64.7 04/24/2023    CALCIUM 8.2 (L) 07/17/2023    JAMAL  1.25 04/24/2023    PHOS 7.1 (H) 07/10/2023       No results for input(s): WBC, RBC, HGB, HCT, PLT, MCV, MCH, MCHC in the last 24 hours.        A/P:  1.mario. better. Resolving.  2.htn. sbp ok.  3.dm2. Following sugars.  4.pna. s/ tx.  5.severe maln. Low albumin. Liberlize diet advised. Needs to eat more.  6.anemia. suzan cbc.  7.hyperphospatemia .suzan phos.

## 2023-07-17 NOTE — PT/OT/SLP EVAL
Physical Therapy Evaluation    Patient Name:  Nelsy Marino   MRN:  [id number]    Recommendations:     Discharge Recommendations: home health PT   Discharge Equipment Recommendations: none       Assessment:     Nelsy Marino is a 82 y.o. female admitted with a medical diagnosis of Septic shock.  She presents with the following impairments/functional limitations: weakness, impaired endurance, impaired functional mobility, gait instability, decreased lower extremity function, decreased ROM, impaired skin.    Rehab Prognosis: Fair; patient would benefit from acute skilled PT services to address these deficits and reach maximum level of function.    Recent Surgery: * No surgery found *      Plan:     During this hospitalization, patient to be seen 3 x/week to address the identified rehab impairments via therapeutic activities, therapeutic exercises and progress toward the following goals:    Plan of Care Expires:  07/24/23    Subjective     Chief Complaint: Pain  Patient/Family Comments/goals: Pt agreeable to therapy evaluations.  Pain/Comfort:  Pain Rating 1: 10/10  Location - Side 1: Bilateral  Location 1: leg  Pain Addressed 1: Reposition    Patients cultural, spiritual, Jewish conflicts given the current situation: no    Living Environment:  PTA pt lived with her  in a 1 story house with no steps to enter.  Prior to admission, patients level of function was transfer to wheelchair.  Equipment used at home: wheelchair.  DME owned (not currently used): rolling walker, bedside commode, shower chair, and hospital bed.  Upon discharge, patient will have assistance from .    Objective:     Communicated with nurseOsorio prior to session.  Patient found supine with rivas catheter, oxygen, telemetry, PICC line  upon PT entry to room.    General Precautions: Standard, fall  Orthopedic Precautions:N/A   Braces: N/A  Respiratory Status: Nasal cannula, flow 3 L/min    Exams:  Skin Integrity/Edema:      -        Skin integrity: (B) feet  RLE ROM: Deficits: decreased extension knees  RLE Strength: grossly 2+/5  LLE ROM: decreased extension in knees  LLE Strength: grossly 2+/5    Functional Mobility:  Bed Mobility:     Supine to Sit: maximal assistance  Transfers:     Bed to Chair: total assistance and of 2 persons with  no AD  using  Squat Pivot      AM-PAC 6 CLICK MOBILITY  Total Score:10       Treatment & Education:  Educated on role of PT and POC, sat EOB; balanc poor requiring mod/max A, transferred to B/S chair.    Patient left  reclined in chair  with all lines intact, call button in reach, and visitor present.    GOALS:   Multidisciplinary Problems       Physical Therapy Goals          Problem: Physical Therapy    Goal Priority Disciplines Outcome Goal Variances Interventions   Physical Therapy Goal     PT, PT/OT Ongoing, Progressing     Description: Goals to be met by: 23     Patient will increase functional independence with mobility by performin. Pt to be min A with bed mobility.  2. Pt to transfer with mod A.  3. Pt/family to be (I) with HEP.                         History:     Past Medical History:   Diagnosis Date    CHF (congestive heart failure)     Diabetes mellitus     DVT (deep venous thrombosis)     Hypertension     Miscarriage     On home oxygen therapy     PRN       Past Surgical History:   Procedure Laterality Date     femoral vascular surgery       ANGIOGRAPHY OF LOWER EXTREMITY Right 2023    Procedure: Angiogram Extremity Unilateral;  Surgeon: Danny Dunbar MD;  Location: [facility] OR;  Service: Vascular;  Laterality: Right;  RN PHONE PREOP 2023----CK CONSENT    ANGIOGRAPHY OF LOWER EXTREMITY Right 2023    Procedure: Angiogram Extremity Unilateral - possible endovascular intervention;  Surgeon: René Simms MD;  Location: [address];  Service: Vascular;  Laterality: Right;  19.6 mins.  740.81 mGy  73.3399 Gy.cm  58ml dye  local - 1ml    COLONOSCOPY N/A 2023     Procedure: COLONOSCOPY;  Surgeon: Cuong Aguiar MD;  Location: [facility] ENDO;  Service: Endoscopy;  Laterality: N/A;    CREATION OF FEMORAL-FEMORAL ARTERY BYPASS WITH GRAFT Bilateral 3/2/2023    Procedure: CREATION, BYPASS, ARTERIAL, FEMORAL TO FEMORAL, USING GRAFT, LEFT FEMORAL ENDARTERECTOMY, LEFT ILIAC ARTERY STENT PLACEMENT, RIGHT LOWER EXTREMITY ANGIOGRAM AND RIGHT SFA  ANGIOPLASTY;  Surgeon: Danny Dunbar MD;  Location: [facility] OR 2ND FLR;  Service: Vascular;  Laterality: Bilateral;   744.47 MGY  175.36 Gycm  Flouro time 20.5 mins      CREATION OF ILIOFEMORAL ARTERY BYPASS Right 3/20/2019    Procedure: CREATION, BYPASS, ARTERIAL, ILIAC TO FEMORAL, RIGHT LOWER EXTREMITY, RIGHT PROFUNDAPLASTY;  Surgeon: Danny Dunbar MD;  Location: [facility] OR;  Service: Vascular;  Laterality: Right;  11:00AM START PER NAZANIN RICHARDS PREOP 3/13/2019  ACT'S, CELL SAVER, GRAFTS, BOOK WATER---NEED H/P  CONSENT IN AM------HGBA1C    CREATION OF MUSCLE ROTATIONAL FLAP Left 3/31/2023    Procedure: CREATION, FLAP, MUSCLE ROTATION;  Surgeon: Brandon Smiley MD;  Location: [facility] OR 2ND FLR;  Service: Plastics;  Laterality: Left;    ENDOSCOPY OF PROXIMAL SMALL INTESTINE N/A 5/18/2023    Procedure: ENTEROSCOPY, PROXIMAL;  Surgeon: Cuong Aguiar MD;  Location: [facility];  Service: Endoscopy;  Laterality: N/A;    ESOPHAGOGASTRODUODENOSCOPY N/A 3/17/2023    Procedure: EGD (ESOPHAGOGASTRODUODENOSCOPY);  Surgeon: Gee Rodriguez MD;  Location: [facility] ENDO (2ND FLR);  Service: Endoscopy;  Laterality: N/A;    ESOPHAGOGASTRODUODENOSCOPY N/A 4/11/2023    Procedure: EGD (ESOPHAGOGASTRODUODENOSCOPY);  Surgeon: Nazanin Kaiser MD;  Location: [facility] ENDO (2ND FLR);  Service: Endoscopy;  Laterality: N/A;    ESOPHAGOGASTRODUODENOSCOPY N/A 4/26/2023    Procedure: EGD (ESOPHAGOGASTRODUODENOSCOPY);  Surgeon: Gee Rodriguez MD;  Location: [facility] (2ND FLR);  Service: Endoscopy;  Laterality: N/A;    ESOPHAGOGASTRODUODENOSCOPY N/A  5/2/2023    Procedure: EGD (ESOPHAGOGASTRODUODENOSCOPY);  Surgeon: Cuong Conteh MD;  Location: [facility] (2ND FLR);  Service: Endoscopy;  Laterality: N/A;    FOOT AMPUTATION THROUGH METATARSAL Right 3/4/2023    Procedure: AMPUTATION, FOOT, TRANSMETATARSAL;  Surgeon: Benjamin Godfrey DPM;  [facility];  Service: Podiatry;  Laterality: Right;    HYSTERECTOMY      ?unsure cervix present    INCISION AND DRAINAGE Right 4/12/2019    Procedure: Incision and Drainage - R groin washout, possible muscle flap;  Surgeon: Danny Dunbar MD;  [facility];  Service: Vascular;  Laterality: Right;  groin washout    INCISION AND DRAINAGE OF GROIN Right 5/3/2019    Procedure: Incision and Drainage R groin;  Surgeon: Danny Dunbar MD;  [facility];  Service: Vascular;  Laterality: Right;    WOUND DEBRIDEMENT Left 3/31/2023    Procedure: DEBRIDEMENT, WOUND;  Surgeon: René Simms MD;  [facility];  Service: Vascular;  Laterality: Left;       Time Tracking:     PT Received On: 07/17/23  PT Start Time: 0941     PT Stop Time: 0957  PT Total Time (min): 16 min     Billable Minutes: Evaluation 16 (Co-tx with BRENDEN Milan)      07/17/2023

## 2023-07-18 VITALS
HEART RATE: 80 BPM | WEIGHT: 141.75 LBS | TEMPERATURE: 100 F | BODY MASS INDEX: 26.09 KG/M2 | DIASTOLIC BLOOD PRESSURE: 68 MMHG | RESPIRATION RATE: 18 BRPM | HEIGHT: 62 IN | SYSTOLIC BLOOD PRESSURE: 119 MMHG | OXYGEN SATURATION: 96 %

## 2023-07-18 PROBLEM — E87.1 HYPONATREMIA: Status: RESOLVED | Noted: 2023-03-03 | Resolved: 2023-07-18

## 2023-07-18 PROBLEM — R57.9 SHOCK, UNSPECIFIED: Status: ACTIVE | Noted: 2023-02-27

## 2023-07-18 PROBLEM — Z48.03 CHANGE OR REMOVAL OF DRAINS: Status: RESOLVED | Noted: 2023-05-22 | Resolved: 2023-07-18

## 2023-07-18 PROBLEM — G93.40 ACUTE ENCEPHALOPATHY: Status: RESOLVED | Noted: 2023-03-13 | Resolved: 2023-07-18

## 2023-07-18 PROBLEM — K59.00 CONSTIPATION: Status: RESOLVED | Noted: 2023-06-24 | Resolved: 2023-07-18

## 2023-07-18 PROBLEM — E66.9 OBESITY (BMI 30-39.9): Status: RESOLVED | Noted: 2019-03-20 | Resolved: 2023-07-18

## 2023-07-18 PROBLEM — R19.7 DIARRHEA OF PRESUMED INFECTIOUS ORIGIN: Status: RESOLVED | Noted: 2023-05-16 | Resolved: 2023-07-18

## 2023-07-18 PROBLEM — Z71.89 ACP (ADVANCE CARE PLANNING): Status: ACTIVE | Noted: 2023-07-11

## 2023-07-18 PROBLEM — J96.21 ACUTE ON CHRONIC RESPIRATORY FAILURE WITH HYPOXIA: Status: ACTIVE | Noted: 2023-06-20

## 2023-07-18 PROBLEM — N30.00 ACUTE CYSTITIS: Status: RESOLVED | Noted: 2023-07-10 | Resolved: 2023-07-18

## 2023-07-18 PROBLEM — D72.829 LEUKOCYTOSIS: Status: RESOLVED | Noted: 2023-03-11 | Resolved: 2023-07-18

## 2023-07-18 PROBLEM — E87.0 HYPERNATREMIA: Status: RESOLVED | Noted: 2023-05-16 | Resolved: 2023-07-18

## 2023-07-18 PROBLEM — R11.14 BILIOUS VOMITING: Status: RESOLVED | Noted: 2023-03-16 | Resolved: 2023-07-18

## 2023-07-18 PROBLEM — R40.0 SOMNOLENCE: Status: RESOLVED | Noted: 2023-04-25 | Resolved: 2023-07-18

## 2023-07-18 PROBLEM — I50.32 CHRONIC DIASTOLIC HEART FAILURE: Status: ACTIVE | Noted: 2023-06-20

## 2023-07-18 PROBLEM — M96.843 POSTOPERATIVE SEROMA OF MUSCULOSKELETAL STRUCTURE AFTER NON-MUSCULOSKELETAL PROCEDURE: Status: RESOLVED | Noted: 2019-04-16 | Resolved: 2023-07-18

## 2023-07-18 PROBLEM — Z48.02 REMOVAL OF STAPLE: Status: RESOLVED | Noted: 2023-05-22 | Resolved: 2023-07-18

## 2023-07-18 PROBLEM — E83.42 HYPOMAGNESEMIA: Status: RESOLVED | Noted: 2023-05-16 | Resolved: 2023-07-18

## 2023-07-18 LAB
ANION GAP SERPL CALC-SCNC: 11 MMOL/L (ref 8–16)
BASOPHILS # BLD AUTO: 0.01 K/UL (ref 0–0.2)
BASOPHILS NFR BLD: 0.2 % (ref 0–1.9)
BUN SERPL-MCNC: 35 MG/DL (ref 8–23)
CALCIUM SERPL-MCNC: 8.5 MG/DL (ref 8.7–10.5)
CHLORIDE SERPL-SCNC: 103 MMOL/L (ref 95–110)
CO2 SERPL-SCNC: 29 MMOL/L (ref 23–29)
CREAT SERPL-MCNC: 0.9 MG/DL (ref 0.5–1.4)
DIFFERENTIAL METHOD: ABNORMAL
EOSINOPHIL # BLD AUTO: 0.2 K/UL (ref 0–0.5)
EOSINOPHIL NFR BLD: 3.5 % (ref 0–8)
ERYTHROCYTE [DISTWIDTH] IN BLOOD BY AUTOMATED COUNT: 18.7 % (ref 11.5–14.5)
EST. GFR  (NO RACE VARIABLE): >60 ML/MIN/1.73 M^2
GLUCOSE SERPL-MCNC: 75 MG/DL (ref 70–110)
HCT VFR BLD AUTO: 25.8 % (ref 37–48.5)
HGB BLD-MCNC: 8.4 G/DL (ref 12–16)
IMM GRANULOCYTES # BLD AUTO: 0.04 K/UL (ref 0–0.04)
IMM GRANULOCYTES NFR BLD AUTO: 0.6 % (ref 0–0.5)
LYMPHOCYTES # BLD AUTO: 1.5 K/UL (ref 1–4.8)
LYMPHOCYTES NFR BLD: 23.3 % (ref 18–48)
MCH RBC QN AUTO: 26.4 PG (ref 27–31)
MCHC RBC AUTO-ENTMCNC: 32.6 G/DL (ref 32–36)
MCV RBC AUTO: 81 FL (ref 82–98)
MONOCYTES # BLD AUTO: 0.6 K/UL (ref 0.3–1)
MONOCYTES NFR BLD: 8.5 % (ref 4–15)
NEUTROPHILS # BLD AUTO: 4.2 K/UL (ref 1.8–7.7)
NEUTROPHILS NFR BLD: 63.9 % (ref 38–73)
NRBC BLD-RTO: 0 /100 WBC
PHOSPHATE SERPL-MCNC: 3.4 MG/DL (ref 2.7–4.5)
PLATELET # BLD AUTO: 290 K/UL (ref 150–450)
PMV BLD AUTO: 9.9 FL (ref 9.2–12.9)
POCT GLUCOSE: 85 MG/DL (ref 70–110)
POCT GLUCOSE: 91 MG/DL (ref 70–110)
POTASSIUM SERPL-SCNC: 2.9 MMOL/L (ref 3.5–5.1)
RBC # BLD AUTO: 3.18 M/UL (ref 4–5.4)
SODIUM SERPL-SCNC: 143 MMOL/L (ref 136–145)
WBC # BLD AUTO: 6.57 K/UL (ref 3.9–12.7)

## 2023-07-18 PROCEDURE — C9113 INJ PANTOPRAZOLE SODIUM, VIA: HCPCS | Performed by: INTERNAL MEDICINE

## 2023-07-18 PROCEDURE — 27000221 HC OXYGEN, UP TO 24 HOURS

## 2023-07-18 PROCEDURE — 94760 N-INVAS EAR/PLS OXIMETRY 1: CPT

## 2023-07-18 PROCEDURE — 63700000 PHARM REV CODE 250 ALT 637 W/O HCPCS: Performed by: INTERNAL MEDICINE

## 2023-07-18 PROCEDURE — 25000003 PHARM REV CODE 250: Performed by: HOSPITALIST

## 2023-07-18 PROCEDURE — 94640 AIRWAY INHALATION TREATMENT: CPT

## 2023-07-18 PROCEDURE — 63600175 PHARM REV CODE 636 W HCPCS: Performed by: INTERNAL MEDICINE

## 2023-07-18 PROCEDURE — A4216 STERILE WATER/SALINE, 10 ML: HCPCS | Performed by: INTERNAL MEDICINE

## 2023-07-18 PROCEDURE — 25000003 PHARM REV CODE 250: Performed by: INTERNAL MEDICINE

## 2023-07-18 PROCEDURE — 80048 BASIC METABOLIC PNL TOTAL CA: CPT | Performed by: INTERNAL MEDICINE

## 2023-07-18 PROCEDURE — 85025 COMPLETE CBC W/AUTO DIFF WBC: CPT | Performed by: INTERNAL MEDICINE

## 2023-07-18 PROCEDURE — 25000242 PHARM REV CODE 250 ALT 637 W/ HCPCS: Performed by: INTERNAL MEDICINE

## 2023-07-18 PROCEDURE — 84100 ASSAY OF PHOSPHORUS: CPT | Performed by: INTERNAL MEDICINE

## 2023-07-18 RX ORDER — POTASSIUM CHLORIDE 20 MEQ/1
40 TABLET, EXTENDED RELEASE ORAL ONCE
Status: COMPLETED | OUTPATIENT
Start: 2023-07-18 | End: 2023-07-18

## 2023-07-18 RX ADMIN — POTASSIUM CHLORIDE 40 MEQ: 1500 TABLET, EXTENDED RELEASE ORAL at 11:07

## 2023-07-18 RX ADMIN — FLUCONAZOLE 100 MG: 100 TABLET ORAL at 09:07

## 2023-07-18 RX ADMIN — IPRATROPIUM BROMIDE AND ALBUTEROL SULFATE 3 ML: 2.5; .5 SOLUTION RESPIRATORY (INHALATION) at 07:07

## 2023-07-18 RX ADMIN — IPRATROPIUM BROMIDE AND ALBUTEROL SULFATE 3 ML: 2.5; .5 SOLUTION RESPIRATORY (INHALATION) at 01:07

## 2023-07-18 RX ADMIN — Medication 10 ML: at 11:07

## 2023-07-18 RX ADMIN — Medication 10 ML: at 05:07

## 2023-07-18 RX ADMIN — OXYCODONE AND ACETAMINOPHEN 1 TABLET: 5; 325 TABLET ORAL at 10:07

## 2023-07-18 RX ADMIN — GABAPENTIN 100 MG: 100 CAPSULE ORAL at 09:07

## 2023-07-18 RX ADMIN — GABAPENTIN 100 MG: 100 CAPSULE ORAL at 02:07

## 2023-07-18 RX ADMIN — PANTOPRAZOLE SODIUM 40 MG: 40 INJECTION, POWDER, FOR SOLUTION INTRAVENOUS at 09:07

## 2023-07-18 RX ADMIN — Medication 10 ML: at 12:07

## 2023-07-18 NOTE — NURSING
[facility] [place]  Nurses Note -- 4 Eyes      7/18/2023       Skin assessed on: Q Shift      [] No Pressure Injuries Present    []Prevention Measures Documented    [x] Yes LDA  for Pressure Injury Previously documented     [] Yes New Pressure Injury Discovered   [] LDA for New Pressure Injury Added      Attending RN:  Nereida Cochran RN     Second RN:  Osorio HOGAN RN

## 2023-07-18 NOTE — PLAN OF CARE
[facility] [place] - Telemetry      HOME HEALTH ORDERS  FACE TO FACE ENCOUNTER    Patient Name: Nelsy Marino  YOB: 1940    PCP: Juan F Garay   PCP Address: [address] / [address]  PCP Phone Number: [phone]  PCP Fax: [phone]    Encounter Date: 7/10/23    Admit to Home Health    Diagnoses:  Active Hospital Problems    Diagnosis  POA    *Shock, unspecified [R57.9]  Yes    ACP (advance care planning) [Z71.89]  Not Applicable     Discussion between Dr montejo and spouse      Acute blood loss anemia [D62]  Yes    Acute metabolic encephalopathy [G93.41]  Yes    Urinary retention [R33.9]  Yes    Acute on chronic respiratory failure with hypoxia [J96.21]  Yes    Hypokalemia [E87.6]  Yes    Moderate malnutrition [E44.0]  Yes    LOLY (acute kidney injury) [N17.9]  Yes    Debility [R53.81]  Yes    Diastolic CHF, chronic [I50.32]  Yes     Chronic    Essential hypertension [I10]  Yes     Chronic    Type 2 diabetes mellitus, with long-term current use of insulin [E11.9, Z79.4]  Not Applicable    PAD (peripheral artery disease) [I73.9]  Yes     Chronic     Patient did not tolerate aspirin or plavix due to GI bleeding        Resolved Hospital Problems    Diagnosis Date Resolved POA    Acute cystitis [N30.00] 07/18/2023 Yes       Follow Up Appointments:  Future Appointments   Date Time Provider Department Center   7/27/2023  8:00 AM LAB, [facility] LAB [facility]   8/4/2023  1:30 PM VASCULAR ULTRASOUND, [facility]   8/4/2023  2:30 PM VASCULAR ULTRASOUND, [facility]   8/8/2023  2:15 PM Danny Dunbar MD [facility] VAS [facility]       Allergies:  Review of patient's allergies indicates:   Allergen Reactions    Motrin [ibuprofen] Itching       Medications: Review discharge medications with patient and family and provide education.    Current Facility-Administered Medications   Medication Dose Route Frequency Provider Last Rate Last Admin    0.9%   NaCl infusion (for blood administration)   Intravenous Q24H PRN Jacobo Phillips MD        acetaminophen tablet 650 mg  650 mg Oral Q4H PRN Jacobo Phillips MD   650 mg at 07/15/23 1145    albuterol-ipratropium 2.5 mg-0.5 mg/3 mL nebulizer solution 3 mL  3 mL Nebulization Q6H Jacobo Phillips MD   3 mL at 07/18/23 0751    dextrose 50% injection 12.5 g  12.5 g Intravenous PRN Jacobo Phillips MD        dextrose 50% injection 25 g  25 g Intravenous PRN Jacobo Phillips MD        fluconazole tablet 100 mg  100 mg Oral Daily Jacobo Phillips MD   100 mg at 07/18/23 0919    gabapentin capsule 100 mg  100 mg Oral TID Tiffany Murillo MD   100 mg at 07/18/23 0919    glucagon (human recombinant) injection 1 mg  1 mg Intramuscular PRN Jacobo Phillips MD        glucose chewable tablet 16 g  16 g Oral PRN Jacobo Phillips MD        glucose chewable tablet 24 g  24 g Oral PRN Jacobo Phillips MD        HYDROmorphone injection 1 mg  1 mg Intravenous Q6H PRN Jacobo Phillips MD        insulin aspart U-100 pen 0-5 Units  0-5 Units Subcutaneous QID (AC + HS) PRN Jacobo Phillips MD   1 Units at 07/11/23 2134    melatonin tablet 6 mg  6 mg Oral Nightly PRN Jacobo Phillips MD        midodrine tablet 5 mg  5 mg Oral TID Jacobo Phillips MD   5 mg at 07/17/23 2019    oxyCODONE-acetaminophen 5-325 mg per tablet 1 tablet  1 tablet Oral Q8H PRN Jacobo Phillips MD   1 tablet at 07/18/23 1047    pantoprazole injection 40 mg  40 mg Intravenous BID Jacobo Phillips MD   40 mg at 07/18/23 0919    potassium chloride SA CR tablet 40 mEq  40 mEq Oral Once Jenniffer Cerrato MD        promethazine (PHENERGAN) 12.5 mg in dextrose 5 % (D5W) 50 mL IVPB  12.5 mg Intravenous Q6H PRN Jacobo Phillips MD        senna-docusate 8.6-50 mg per tablet 1 tablet  1 tablet Oral Daily PRN Jacobo Phillips MD        sodium chloride 0.9% flush 10 mL  10 mL Intravenous PRN Jacobo Phillips MD      "   sodium chloride 0.9% flush 10 mL  10 mL Intravenous Q6H Jacobo Phillips MD   10 mL at 07/18/23 2452     Current Discharge Medication List        CONTINUE these medications which have NOT CHANGED    Details   amLODIPine (NORVASC) 10 MG tablet Take 1 tablet (10 mg total) by mouth once daily.  Qty: 90 tablet, Refills: 0    Comments: .      atorvastatin (LIPITOR) 40 MG tablet Take 1 tablet (40 mg total) by mouth every evening.  Qty: 30 tablet, Refills: 0      carvediloL (COREG) 25 MG tablet Take 1 tablet (25 mg total) by mouth 2 (two) times daily.  Qty: 60 tablet, Refills: 0    Comments: .      cephALEXin (KEFLEX) 500 MG capsule Take 1 capsule (500 mg total) by mouth every 12 (twelve) hours.  Qty: 60 capsule, Refills: 11    Associated Diagnoses: Vascular graft infection, subsequent encounter      diphenhydrAMINE (BENADRYL) 25 mg capsule Take 25 mg by mouth daily as needed for Itching.      fluconazole (DIFLUCAN) 200 MG Tab Take 2 tablets (400 mg total) by mouth once daily.  Qty: 60 tablet, Refills: 11    Associated Diagnoses: Vascular graft infection, subsequent encounter      furosemide (LASIX) 40 MG tablet Take 1 tablet (40 mg total) by mouth once daily.  Qty: 30 tablet, Refills: 11      pantoprazole (PROTONIX) 40 MG tablet Take 1 tablet (40 mg total) by mouth once daily.  Qty: 90 tablet, Refills: 0      acetaminophen (TYLENOL) 500 MG tablet Take 2 tablets (1,000 mg total) by mouth every 8 (eight) hours.  Refills: 0      insulin detemir U-100, Levemir, 100 unit/mL (3 mL) SubQ InPn pen Inject 5 Units into the skin every evening.  Qty: 6 mL, Refills: 3      insulin syringe-needle,dispos. 0.3 mL 30 gauge x 5/16" Syrg by Misc.(Non-Drug; Combo Route) route.      oxyCODONE-acetaminophen (PERCOCET) 5-325 mg per tablet Take 1 tablet by mouth every 8 (eight) hours as needed for Pain.  Qty: 30 tablet, Refills: 0    Comments: Quantity prescribed more than 7 day supply? Yes, quantity medically necessary      pen needle, " "diabetic 32 gauge x 5/32" Ndle Inject 1 each into the skin 4 (four) times daily.  Qty: 100 each, Refills: 0      senna (SENOKOT) 8.6 mg tablet Take 1 tablet by mouth once daily.  Qty: 30 tablet, Refills: 11           STOP taking these medications       DULoxetine (CYMBALTA) 30 MG capsule Comments:   Reason for Stopping:         hydrALAZINE (APRESOLINE) 25 MG tablet Comments:   Reason for Stopping:         oxyCODONE (ROXICODONE) 5 MG immediate release tablet Comments:   Reason for Stopping:         vitamin D (VITAMIN D3) 1000 units Tab Comments:   Reason for Stopping:                 I have seen and examined this patient within the last 30 days. My clinical findings that support the need for the home health skilled services and home bound status are the following:no   Weakness/numbness causing balance and gait disturbance due to Weakness/Debility making it taxing to leave home.  Medical restrictions requiring assistance of another human to leave home due to  Fluid/volume overload, Unstable ambulation, Wound care needs, and Home oxygen requirement.     Diet:   diabetic diet 2000 calorie      Referrals/ Consults  Physical Therapy to evaluate and treat. Evaluate for home safety and equipment needs; Establish/upgrade home exercise program. Perform / instruct on therapeutic exercises, gait training, transfer training, and Range of Motion.  Occupational Therapy to evaluate and treat. Evaluate home environment for safety and equipment needs. Perform/Instruct on transfers, ADL training, ROM, and therapeutic exercises.   to evaluate for community resources/long-range planning.  Aide to provide assistance with personal care, ADLs, and vital signs.    Please consult home Palliative Care     Activities:   activity as tolerated    Nursing:   Agency to admit patient within 24 hours of hospital discharge unless specified on physician order or at patient request    SN to complete comprehensive assessment including " routine vital signs. Instruct on disease process and s/s of complications to report to MD. Review/verify medication list sent home with the patient at time of discharge  and instruct patient/caregiver as needed. Frequency may be adjusted depending on start of care date.     Skilled nurse to perform up to 3 visits PRN for symptoms related to diagnosis    Notify MD if SBP > 160 or < 90; DBP > 90 or < 50; HR > 120 or < 50; Temp > 101; O2 < 88%    Ok to schedule additional visits based on staff availability and patient request on consecutive days within the home health episode.    When multiple disciplines ordered:    Start of Care occurs on Sunday - Wednesday schedule remaining discipline evaluations as ordered on separate consecutive days following the start of care.    Thursday SOC -schedule subsequent evaluations Friday and Monday the following week.     Friday - Saturday SOC - schedule subsequent discipline evaluations on consecutive days starting Monday of the following week.    For all post-discharge communication and subsequent orders please contact patient's primary care physician.        Wound Care Orders  1. Left groin dime size open wound-cleanse with vashe and cover with Mepilex 4x4  2. Chronic left heel unstagable pressure injury-Cleanse with vashe and cover with dry gauze secured with Kerlix roll. Do not apply ACE or Coban- avoid compression.  3.  Right TMA -cleanse with vashe, apply betadine cover with dry gauze secure cast wrap to pad and Kerlix and Coban for football type dressing.  4. Left knee- Cleanse with Vashe. Apply dry gauze dressing and secure with Kerlix roll.   4. Left knee wound- cleanse with Vashe and dress with Mepilex 3x3 dressing every 3 days  Green boot to off load BLE    I certify that this patient is confined to her home and needs intermittent skilled nursing care, physical therapy, and occupational therapy.    Jenniffer Cerrato MD  07/18/2023  11:32 AM

## 2023-07-18 NOTE — PROGRESS NOTES
Date of Admission:7/10/2023    SUBJECTIVE:not eating much  Current Facility-Administered Medications   Medication    0.9%  NaCl infusion (for blood administration)    acetaminophen tablet 650 mg    albuterol-ipratropium 2.5 mg-0.5 mg/3 mL nebulizer solution 3 mL    dextrose 50% injection 12.5 g    dextrose 50% injection 25 g    fluconazole tablet 100 mg    gabapentin capsule 100 mg    glucagon (human recombinant) injection 1 mg    glucose chewable tablet 16 g    glucose chewable tablet 24 g    HYDROmorphone injection 1 mg    insulin aspart U-100 pen 0-5 Units    melatonin tablet 6 mg    midodrine tablet 5 mg    oxyCODONE-acetaminophen 5-325 mg per tablet 1 tablet    pantoprazole injection 40 mg    promethazine (PHENERGAN) 12.5 mg in dextrose 5 % (D5W) 50 mL IVPB    senna-docusate 8.6-50 mg per tablet 1 tablet    sodium chloride 0.9% flush 10 mL    sodium chloride 0.9% flush 10 mL       Wt Readings from Last 3 Encounters:   07/17/23 64.3 kg (141 lb 12.1 oz)   06/28/23 60.3 kg (132 lb 15 oz)   06/24/23 60.3 kg (132 lb 15 oz)     Temp Readings from Last 3 Encounters:   07/18/23 100.1 °F (37.8 °C) (Oral)   06/26/23 98.3 °F (36.8 °C)   06/08/23 98.2 °F (36.8 °C) (Oral)     BP Readings from Last 3 Encounters:   07/18/23 119/68   06/26/23 (!) 117/58   06/13/23 (!) 126/44     Pulse Readings from Last 3 Encounters:   07/18/23 80   06/26/23 87   06/13/23 64       Intake/Output Summary (Last 24 hours) at 7/18/2023 1418  Last data filed at 7/18/2023 0306  Gross per 24 hour   Intake --   Output 2200 ml   Net -2200 ml         PE:  GEN:wd female in nad  HEENT:ncat,eomi,mm  CVS:s1s2 regualr  PULM:diminished  ABD:bs,soft  EXT:no leg edema  NEURO:awake    Recent Labs   Lab 07/18/23  0447   GLU 75      K 2.9*      CO2 29   BUN 35*   CREATININE 0.9   CALCIUM 8.5*         Lab Results   Component Value Date    PTH 64.7 04/24/2023    CALCIUM 8.5 (L) 07/18/2023    CAION 1.25 04/24/2023    PHOS 3.4 07/18/2023       Recent Labs    Lab 07/18/23  0447   WBC 6.57   RBC 3.18*   HGB 8.4*   HCT 25.8*      MCV 81*   MCH 26.4*   MCHC 32.6           A/P:  1.mario. better. Resolved.  2.htn. sbp ok.  3.dm2. Following sugars.  4.pna. s/ tx.  5.severe maln. Low albumin. Liberlize diet advised. Needs to eat more.  6.anemia. suzan cbc ok. Needs to eat more.  7.hyperphospatemia .suzan phos better.

## 2023-07-18 NOTE — NURSING
AVS reviewed by virtual nurse with patient and spouse. Review included, home medications, precautions and upcoming appointments, as well as [facility] On Call nurse line.

## 2023-07-18 NOTE — PLAN OF CARE
07/18/23 1032   Medicare Message   Important Message from Medicare regarding Discharge Appeal Rights Given to patient/caregiver;Explained to patient/caregiver;Signed/date by patient/caregiver   Date IMM was signed 07/18/23   Time IMM was signed 1000

## 2023-07-18 NOTE — PLAN OF CARE
Problem: Adult Inpatient Plan of Care  Goal: Plan of Care Review  Outcome: Ongoing, Progressing  Goal: Patient-Specific Goal (Individualized)  Outcome: Ongoing, Progressing  Goal: Absence of Hospital-Acquired Illness or Injury  Outcome: Ongoing, Progressing  Goal: Optimal Comfort and Wellbeing  Outcome: Ongoing, Progressing  Goal: Readiness for Transition of Care  Outcome: Ongoing, Progressing     Problem: Oral Intake Inadequate (Acute Kidney Injury/Impairment)  Goal: Optimal Nutrition Intake  Outcome: Ongoing, Progressing     Problem: Skin Injury Risk Increased  Goal: Skin Health and Integrity  Outcome: Ongoing, Progressing

## 2023-07-18 NOTE — DISCHARGE SUMMARY
[facility] Medicine  Discharge Summary      Patient Name: Nelsy Marino  MRN: [id number]  [facility]anner: [id number]  Patient Class: IP- Inpatient  Admission Date: 7/10/2023  Hospital Length of Stay: 8 days  Discharge Date and Time:  07/18/2023 11:30 AM  Attending Physician: Jenniffer Cerrato MD   Discharging Provider: Jenniffer Cerrato MD  Primary Care Provider: [facility]    Primary Care Team: Networked reference to record PCT     HPI:   82 year old female with PMHx of HFrEF, COPD, DM, HTN, DVT brought in to the ED by EMS due to change in mental status over the past 3 days. Patient altered and history obtained from ED physician and  at bedside. Patient's  reports that the patient has been confused and has been vomiting and complaining of abdominal pain with poor PO intake and he has noticed decreased urine output as well. Patient has not been ambulating due to a fall and recent amputation of R toes. In the ED, patient noted to be hypoxic requiring 2-4L NC, BNP elevated and CXR with some fluid and possible PNA. Patient started on sepsis protocol due to leukocytosis, low BP, and UTI. Found to have urinary retention and rivas placed. Started on IV abx and admitted to ICU for further management.       * No surgery found *      Hospital Course:   Patient admitted to the ICU with shock and suspected sepsis from unknown source. Started vasopressors. Pulmonary consulted for hypoxia which was likely due to pleural effusions, lethargy, volume overload and possible pneumonia. Cultures no growth. Urine culture with yeast only. Weaned off vasopressors. Renal function, mental status improved. Stepped down to floor. She is awake and alert with no complaints. Unclear what exactly caused her shock. She has several wounds- wound care consulted. None appear to have active infection. She is on chronic suppressive fluconazole and keflex. Discussed plan with patient and . They say she has been in  and out of the hospital for months. They understand that she has multiple medical problems and will probably be hospitalized again with exacerbation of one or more of her chronic medical problems. They are not currently interested in hospice care. They have DME at home and home health. They are interested in addition of Palliative care visits. CHOCO completed- DNR, no vent, no dialysis, no artificial nutrition. Reviewed medications with patient and her - continue home keflex and fluconazole- being used for chronic suppression per ID note 6/2023.        Goals of Care Treatment Preferences:  Code Status: DNR       CHOCO completed 7/18/23    Consults:   Consults (From admission, onward)        Status Ordering Provider     Inpatient consult to Social Work  Once        Provider:  (Not yet assigned)    Completed JENNA CREWS     Inpatient consult to Nephrology  Once        Provider:  (Not yet assigned)    Acknowledged JENNA CREWS     Inpatient consult to Palliative Care  Once        Provider:  (Not yet assigned)    Completed JAYSON SIMPSON     Inpatient consult to Spiritual Care  Once        Provider:  (Not yet assigned)    Completed JAYSON SIMPSON     Inpatient consult to Pulmonology  Once        Provider:  (Not yet assigned)    Completed JENNA CREWS     IP consult to case management  Once        Provider:  (Not yet assigned)    Completed ALICJA COX          No new Assessment & Plan notes have been filed under this hospital service since the last note was generated.  Service: Hospital Medicine    Final Active Diagnoses:    Diagnosis Date Noted POA    PRINCIPAL PROBLEM:  Shock, unspecified [R57.9] 02/27/2023 Yes    ACP (advance care planning) [Z71.89] 07/11/2023 Not Applicable    Acute blood loss anemia [D62] 07/11/2023 Yes    Acute metabolic encephalopathy [G93.41] 07/11/2023 Yes    Urinary retention [R33.9] 06/24/2023 Yes    Acute on chronic respiratory failure with  hypoxia [J96.21] 06/20/2023 Yes    Hypokalemia [E87.6] 05/16/2023 Yes    Moderate malnutrition [E44.0] 04/28/2023 Yes    LOLY (acute kidney injury) [N17.9] 04/21/2023 Yes    Debility [R53.81] 04/21/2023 Yes    Diastolic CHF, chronic [I50.32] 03/20/2019 Yes     Chronic    Essential hypertension [I10] 03/20/2019 Yes     Chronic    Type 2 diabetes mellitus, with long-term current use of insulin [E11.9, Z79.4] 03/20/2019 Not Applicable    PAD (peripheral artery disease) [I73.9] 03/20/2019 Yes     Chronic      Problems Resolved During this Admission:    Diagnosis Date Noted Date Resolved POA    Acute cystitis [N30.00] 07/10/2023 07/18/2023 Yes       Discharged Condition: stable    Disposition: Home-Health Care Svc    Follow Up: with PCP    Patient Instructions:      Diet diabetic     Notify your health care provider if you experience any of the following:  temperature >100.4     Notify your health care provider if you experience any of the following:  persistent nausea and vomiting or diarrhea     Notify your health care provider if you experience any of the following:  severe uncontrolled pain     Notify your health care provider if you experience any of the following:  redness, tenderness, or signs of infection (pain, swelling, redness, odor or green/yellow discharge around incision site)     Notify your health care provider if you experience any of the following:  difficulty breathing or increased cough     Notify your health care provider if you experience any of the following:  severe persistent headache     Notify your health care provider if you experience any of the following:  worsening rash     Notify your health care provider if you experience any of the following:  persistent dizziness, light-headedness, or visual disturbances     Notify your health care provider if you experience any of the following:  increased confusion or weakness     Activity as tolerated       Significant Diagnostic Studies:  "N/A    Pending Diagnostic Studies:     None         Medications:  Reconciled Home Medications:      Medication List      CONTINUE taking these medications    acetaminophen 500 MG tablet  Commonly known as: TYLENOL  Take 2 tablets (1,000 mg total) by mouth every 8 (eight) hours.     amLODIPine 10 MG tablet  Commonly known as: NORVASC  Take 1 tablet (10 mg total) by mouth once daily.     atorvastatin 40 MG tablet  Commonly known as: LIPITOR  Take 1 tablet (40 mg total) by mouth every evening.     BD SHERINE 2ND GEN PEN NEEDLE 32 gauge x 5/32" Ndle  Generic drug: pen needle, diabetic  Inject 1 each into the skin 4 (four) times daily.     carvediloL 25 MG tablet  Commonly known as: COREG  Take 1 tablet (25 mg total) by mouth 2 (two) times daily.     cephALEXin 500 MG capsule  Commonly known as: KEFLEX  Take 1 capsule (500 mg total) by mouth every 12 (twelve) hours.     diphenhydrAMINE 25 mg capsule  Commonly known as: BENADRYL  Take 25 mg by mouth daily as needed for Itching.     fluconazole 200 MG Tab  Commonly known as: DIFLUCAN  Take 2 tablets (400 mg total) by mouth once daily.     furosemide 40 MG tablet  Commonly known as: LASIX  Take 1 tablet (40 mg total) by mouth once daily.     insulin syringe-needle,dispos. 0.3 mL 30 gauge x 5/16" Syrg  by Misc.(Non-Drug; Combo Route) route.     LEVEMIR FLEXPEN 100 unit/mL (3 mL) Inpn pen  Generic drug: insulin detemir U-100 (Levemir)  Inject 5 Units into the skin every evening.     oxyCODONE-acetaminophen 5-325 mg per tablet  Commonly known as: PERCOCET  Take 1 tablet by mouth every 8 (eight) hours as needed for Pain.     pantoprazole 40 MG tablet  Commonly known as: PROTONIX  Take 1 tablet (40 mg total) by mouth once daily.     senna 8.6 mg tablet  Commonly known as: SENOKOT  Take 1 tablet by mouth once daily.        STOP taking these medications    DULoxetine 30 MG capsule  Commonly known as: CYMBALTA     hydrALAZINE 25 MG tablet  Commonly known as: APRESOLINE     oxyCODONE 5 " MG immediate release tablet  Commonly known as: ROXICODONE     vitamin D 1000 units Tab  Commonly known as: VITAMIN D3            Indwelling Lines/Drains at time of discharge:   none    Time spent on the discharge of patient: 35 minutes         Jenniffer Cerrato MD  Department of Hospital Medicine  [facility]

## 2023-07-18 NOTE — PLAN OF CARE
Pt will resume home health services with [facility]. Referral sent to [facility] for palliative. CM notified nurse, Osorio that pt is ready for discharge from CM standpoint. All CM needs met.     07/18/23 1207   Final Note   Assessment Type Final Discharge Note   Anticipated Discharge Disposition Home-Health   What phone number can be called within the next 1-3 days to see how you are doing after discharge? [phone]   Hospital Resources/Appts/Education Provided Appointments scheduled and added to AVS;Appointments scheduled by Navigator/Coordinator   Post-Acute Status   Post-Acute Authorization Home Health;Hospice   Home Health Status Set-up Complete/Auth obtained   Hospice Status Pending post acute provider review/more information requested   [facility]   Patient choice form signed by patient/caregiver List from System Post-Acute Care   Discharge Delays None known at this time

## 2023-07-18 NOTE — NURSING
[facility]  Nurses Note -- 4 Eyes      7/18/2023       Skin assessed on: Q Shift      [] No Pressure Injuries Present    []Prevention Measures Documented    [x] Yes LDA  for Pressure Injury Previously documented     [] Yes New Pressure Injury Discovered   [] LDA for New Pressure Injury Added      Attending RN:  Osorio Esquivel RN     Second RN:  Nereida ARCE RN

## 2023-07-19 ENCOUNTER — PATIENT OUTREACH (OUTPATIENT)
Dept: ADMINISTRATIVE | Facility: CLINIC | Age: 83
End: 2023-07-19
Payer: MEDICARE

## 2023-07-19 NOTE — PROGRESS NOTES
C3 nurse attempted to contact Nelsy Marino, spouse and daughter for a TCC post hospital discharge follow up call. No answer. Left voicemail with callback information. The patient has a scheduled [facility] appointment with Juan F Garay  on 07/20/2023 @  2:40 PM.

## 2023-07-20 RX ORDER — ONDANSETRON HYDROCHLORIDE 8 MG/1
TABLET, FILM COATED ORAL EVERY 8 HOURS PRN
Status: ON HOLD | COMMUNITY
End: 2023-08-03 | Stop reason: HOSPADM

## 2023-07-20 NOTE — PROGRESS NOTES
C3 nurse spoke with Nelsy SAGRARIO Marino, spouse, Dimas,  for a TCC post hospital discharge follow up call. The patient has a scheduled HOSFU appointment with Juan F Garay  on 07/20/2023 @ 2:40 PM.

## 2023-07-27 ENCOUNTER — LAB VISIT (OUTPATIENT)
Dept: LAB | Facility: HOSPITAL | Age: 83
End: 2023-07-27
Attending: INTERNAL MEDICINE
Payer: MEDICARE

## 2023-07-27 DIAGNOSIS — Z79.2 CHRONIC ANTIBIOTIC SUPPRESSION: ICD-10-CM

## 2023-07-27 DIAGNOSIS — T82.7XXD VASCULAR GRAFT INFECTION, SUBSEQUENT ENCOUNTER: ICD-10-CM

## 2023-07-27 LAB
ALBUMIN SERPL BCP-MCNC: 2 G/DL (ref 3.5–5.2)
ALP SERPL-CCNC: 106 U/L (ref 55–135)
ALT SERPL W/O P-5'-P-CCNC: 15 U/L (ref 10–44)
ANION GAP SERPL CALC-SCNC: 12 MMOL/L (ref 8–16)
AST SERPL-CCNC: 55 U/L (ref 10–40)
BILIRUB SERPL-MCNC: 0.4 MG/DL (ref 0.1–1)
BUN SERPL-MCNC: 39 MG/DL (ref 8–23)
CALCIUM SERPL-MCNC: 8.2 MG/DL (ref 8.7–10.5)
CHLORIDE SERPL-SCNC: 103 MMOL/L (ref 95–110)
CO2 SERPL-SCNC: 29 MMOL/L (ref 23–29)
CREAT SERPL-MCNC: 1.3 MG/DL (ref 0.5–1.4)
EST. GFR  (NO RACE VARIABLE): 41 ML/MIN/1.73 M^2
GLUCOSE SERPL-MCNC: 87 MG/DL (ref 70–110)
POTASSIUM SERPL-SCNC: 3.1 MMOL/L (ref 3.5–5.1)
PROT SERPL-MCNC: 6.9 G/DL (ref 6–8.4)
SODIUM SERPL-SCNC: 144 MMOL/L (ref 136–145)

## 2023-07-27 PROCEDURE — 36415 COLL VENOUS BLD VENIPUNCTURE: CPT | Performed by: INTERNAL MEDICINE

## 2023-07-27 PROCEDURE — 80053 COMPREHEN METABOLIC PANEL: CPT | Performed by: INTERNAL MEDICINE

## 2023-08-01 ENCOUNTER — HOSPITAL ENCOUNTER (INPATIENT)
Facility: HOSPITAL | Age: 83
LOS: 2 days | Discharge: HOSPICE | DRG: 871 | End: 2023-08-03
Attending: EMERGENCY MEDICINE | Admitting: EMERGENCY MEDICINE
Payer: MEDICARE

## 2023-08-01 DIAGNOSIS — I50.9 ACUTE ON CHRONIC CONGESTIVE HEART FAILURE, UNSPECIFIED HEART FAILURE TYPE: ICD-10-CM

## 2023-08-01 DIAGNOSIS — R57.9 SHOCK: Primary | ICD-10-CM

## 2023-08-01 DIAGNOSIS — N30.00 ACUTE CYSTITIS WITHOUT HEMATURIA: ICD-10-CM

## 2023-08-01 DIAGNOSIS — A41.9 SEPSIS, DUE TO UNSPECIFIED ORGANISM, UNSPECIFIED WHETHER ACUTE ORGAN DYSFUNCTION PRESENT: ICD-10-CM

## 2023-08-01 DIAGNOSIS — N17.9 AKI (ACUTE KIDNEY INJURY): ICD-10-CM

## 2023-08-01 LAB
ALBUMIN SERPL BCP-MCNC: 1.7 G/DL (ref 3.5–5.2)
ALLENS TEST: ABNORMAL
ALP SERPL-CCNC: 114 U/L (ref 55–135)
ALT SERPL W/O P-5'-P-CCNC: 11 U/L (ref 10–44)
ANION GAP SERPL CALC-SCNC: 13 MMOL/L (ref 8–16)
ANION GAP SERPL CALC-SCNC: 17 MMOL/L (ref 8–16)
AST SERPL-CCNC: 46 U/L (ref 10–40)
BACTERIA #/AREA URNS HPF: ABNORMAL /HPF
BASOPHILS # BLD AUTO: 0.02 K/UL (ref 0–0.2)
BASOPHILS NFR BLD: 0.4 % (ref 0–1.9)
BILIRUB SERPL-MCNC: 0.3 MG/DL (ref 0.1–1)
BILIRUB UR QL STRIP: NEGATIVE
BNP SERPL-MCNC: 2091 PG/ML (ref 0–99)
BUN SERPL-MCNC: 48 MG/DL (ref 6–30)
BUN SERPL-MCNC: 56 MG/DL (ref 8–23)
CALCIUM SERPL-MCNC: 7.7 MG/DL (ref 8.7–10.5)
CHLORIDE SERPL-SCNC: 104 MMOL/L (ref 95–110)
CHLORIDE SERPL-SCNC: 106 MMOL/L (ref 95–110)
CLARITY UR: ABNORMAL
CO2 SERPL-SCNC: 24 MMOL/L (ref 23–29)
COLOR UR: ABNORMAL
CREAT SERPL-MCNC: 3.8 MG/DL (ref 0.5–1.4)
CREAT SERPL-MCNC: 4.2 MG/DL (ref 0.5–1.4)
DELSYS: ABNORMAL
DIFFERENTIAL METHOD: ABNORMAL
EOSINOPHIL # BLD AUTO: 0.3 K/UL (ref 0–0.5)
EOSINOPHIL NFR BLD: 6.1 % (ref 0–8)
ERYTHROCYTE [DISTWIDTH] IN BLOOD BY AUTOMATED COUNT: 19.6 % (ref 11.5–14.5)
EST. GFR  (NO RACE VARIABLE): 11 ML/MIN/1.73 M^2
FLOW: 4
GLUCOSE SERPL-MCNC: 74 MG/DL (ref 70–110)
GLUCOSE SERPL-MCNC: 79 MG/DL (ref 70–110)
GLUCOSE UR QL STRIP: NEGATIVE
HCO3 UR-SCNC: 24.9 MMOL/L (ref 24–28)
HCT VFR BLD AUTO: 24.6 % (ref 37–48.5)
HCT VFR BLD CALC: 24 %PCV (ref 36–54)
HGB BLD-MCNC: 7.5 G/DL (ref 12–16)
HGB UR QL STRIP: NEGATIVE
HYALINE CASTS #/AREA URNS LPF: 23 /LPF
IMM GRANULOCYTES # BLD AUTO: 0.02 K/UL (ref 0–0.04)
IMM GRANULOCYTES NFR BLD AUTO: 0.4 % (ref 0–0.5)
KETONES UR QL STRIP: ABNORMAL
LACTATE SERPL-SCNC: 2.2 MMOL/L (ref 0.5–2.2)
LEUKOCYTE ESTERASE UR QL STRIP: ABNORMAL
LIPASE SERPL-CCNC: 28 U/L (ref 4–60)
LYMPHOCYTES # BLD AUTO: 0.8 K/UL (ref 1–4.8)
LYMPHOCYTES NFR BLD: 13.7 % (ref 18–48)
MAGNESIUM SERPL-MCNC: 2.1 MG/DL (ref 1.6–2.6)
MCH RBC QN AUTO: 25.2 PG (ref 27–31)
MCHC RBC AUTO-ENTMCNC: 30.5 G/DL (ref 32–36)
MCV RBC AUTO: 83 FL (ref 82–98)
MICROSCOPIC COMMENT: ABNORMAL
MODE: ABNORMAL
MONOCYTES # BLD AUTO: 0.3 K/UL (ref 0.3–1)
MONOCYTES NFR BLD: 5.7 % (ref 4–15)
NEUTROPHILS # BLD AUTO: 4.1 K/UL (ref 1.8–7.7)
NEUTROPHILS NFR BLD: 73.7 % (ref 38–73)
NITRITE UR QL STRIP: NEGATIVE
NRBC BLD-RTO: 0 /100 WBC
PCO2 BLDA: 48.8 MMHG (ref 35–45)
PH SMN: 7.32 [PH] (ref 7.35–7.45)
PH UR STRIP: 5 [PH] (ref 5–8)
PLATELET # BLD AUTO: 397 K/UL (ref 150–450)
PMV BLD AUTO: 10.1 FL (ref 9.2–12.9)
PO2 BLDA: 66 MMHG (ref 80–100)
POC BE: -1 MMOL/L
POC IONIZED CALCIUM: 0.94 MMOL/L (ref 1.06–1.42)
POC SATURATED O2: 91 % (ref 95–100)
POC TCO2 (MEASURED): 25 MMOL/L (ref 23–29)
POC TCO2: 26 MMOL/L (ref 23–27)
POCT GLUCOSE: 99 MG/DL (ref 70–110)
POTASSIUM BLD-SCNC: 3.5 MMOL/L (ref 3.5–5.1)
POTASSIUM SERPL-SCNC: 3.6 MMOL/L (ref 3.5–5.1)
PROCALCITONIN SERPL IA-MCNC: 1.11 NG/ML
PROT SERPL-MCNC: 5.9 G/DL (ref 6–8.4)
PROT UR QL STRIP: ABNORMAL
RBC # BLD AUTO: 2.98 M/UL (ref 4–5.4)
RBC #/AREA URNS HPF: 41 /HPF (ref 0–4)
SAMPLE: ABNORMAL
SAMPLE: ABNORMAL
SITE: ABNORMAL
SODIUM BLD-SCNC: 141 MMOL/L (ref 136–145)
SODIUM SERPL-SCNC: 143 MMOL/L (ref 136–145)
SP GR UR STRIP: 1.03 (ref 1–1.03)
SP02: 95
TROPONIN I SERPL DL<=0.01 NG/ML-MCNC: 0.12 NG/ML (ref 0–0.03)
URN SPEC COLLECT METH UR: ABNORMAL
UROBILINOGEN UR STRIP-ACNC: ABNORMAL EU/DL
WBC # BLD AUTO: 5.61 K/UL (ref 3.9–12.7)
WBC #/AREA URNS HPF: >100 /HPF (ref 0–5)
WBC CLUMPS URNS QL MICRO: ABNORMAL
YEAST URNS QL MICRO: ABNORMAL

## 2023-08-01 PROCEDURE — 93005 ELECTROCARDIOGRAM TRACING: CPT

## 2023-08-01 PROCEDURE — 93010 ELECTROCARDIOGRAM REPORT: CPT | Mod: 76,,, | Performed by: INTERNAL MEDICINE

## 2023-08-01 PROCEDURE — 84132 ASSAY OF SERUM POTASSIUM: CPT

## 2023-08-01 PROCEDURE — 83605 ASSAY OF LACTIC ACID: CPT | Performed by: EMERGENCY MEDICINE

## 2023-08-01 PROCEDURE — 96368 THER/DIAG CONCURRENT INF: CPT

## 2023-08-01 PROCEDURE — 81000 URINALYSIS NONAUTO W/SCOPE: CPT | Mod: 91 | Performed by: EMERGENCY MEDICINE

## 2023-08-01 PROCEDURE — 96365 THER/PROPH/DIAG IV INF INIT: CPT

## 2023-08-01 PROCEDURE — 84484 ASSAY OF TROPONIN QUANT: CPT | Mod: 91 | Performed by: EMERGENCY MEDICINE

## 2023-08-01 PROCEDURE — 82962 GLUCOSE BLOOD TEST: CPT

## 2023-08-01 PROCEDURE — 83690 ASSAY OF LIPASE: CPT | Performed by: EMERGENCY MEDICINE

## 2023-08-01 PROCEDURE — 93010 EKG 12-LEAD: ICD-10-PCS | Mod: 76,,, | Performed by: INTERNAL MEDICINE

## 2023-08-01 PROCEDURE — 12000002 HC ACUTE/MED SURGE SEMI-PRIVATE ROOM

## 2023-08-01 PROCEDURE — 87040 BLOOD CULTURE FOR BACTERIA: CPT | Mod: 59 | Performed by: EMERGENCY MEDICINE

## 2023-08-01 PROCEDURE — 36600 WITHDRAWAL OF ARTERIAL BLOOD: CPT

## 2023-08-01 PROCEDURE — 84295 ASSAY OF SERUM SODIUM: CPT

## 2023-08-01 PROCEDURE — 85014 HEMATOCRIT: CPT

## 2023-08-01 PROCEDURE — 96361 HYDRATE IV INFUSION ADD-ON: CPT

## 2023-08-01 PROCEDURE — 84145 PROCALCITONIN (PCT): CPT | Performed by: EMERGENCY MEDICINE

## 2023-08-01 PROCEDURE — 82330 ASSAY OF CALCIUM: CPT

## 2023-08-01 PROCEDURE — 82565 ASSAY OF CREATININE: CPT

## 2023-08-01 PROCEDURE — 83880 ASSAY OF NATRIURETIC PEPTIDE: CPT | Mod: 91 | Performed by: EMERGENCY MEDICINE

## 2023-08-01 PROCEDURE — 99900035 HC TECH TIME PER 15 MIN (STAT)

## 2023-08-01 PROCEDURE — 85025 COMPLETE CBC W/AUTO DIFF WBC: CPT | Mod: 91 | Performed by: EMERGENCY MEDICINE

## 2023-08-01 PROCEDURE — 83735 ASSAY OF MAGNESIUM: CPT | Performed by: EMERGENCY MEDICINE

## 2023-08-01 PROCEDURE — 82803 BLOOD GASES ANY COMBINATION: CPT

## 2023-08-01 PROCEDURE — 87086 URINE CULTURE/COLONY COUNT: CPT | Mod: 59 | Performed by: EMERGENCY MEDICINE

## 2023-08-01 PROCEDURE — 63600175 PHARM REV CODE 636 W HCPCS: Performed by: EMERGENCY MEDICINE

## 2023-08-01 PROCEDURE — 80047 BASIC METABLC PNL IONIZED CA: CPT

## 2023-08-01 PROCEDURE — 25000003 PHARM REV CODE 250: Performed by: EMERGENCY MEDICINE

## 2023-08-01 PROCEDURE — 80053 COMPREHEN METABOLIC PANEL: CPT | Mod: 91 | Performed by: EMERGENCY MEDICINE

## 2023-08-01 PROCEDURE — 99291 CRITICAL CARE FIRST HOUR: CPT

## 2023-08-01 RX ADMIN — VANCOMYCIN HYDROCHLORIDE 1000 MG: 1 INJECTION, POWDER, LYOPHILIZED, FOR SOLUTION INTRAVENOUS at 11:08

## 2023-08-01 RX ADMIN — NOREPINEPHRINE BITARTRATE 0.13 MCG/KG/MIN: 1 INJECTION, SOLUTION, CONCENTRATE INTRAVENOUS at 09:08

## 2023-08-01 RX ADMIN — SODIUM CHLORIDE 1000 ML: 9 INJECTION, SOLUTION INTRAVENOUS at 09:08

## 2023-08-01 RX ADMIN — NOREPINEPHRINE BITARTRATE 0.1 MCG/KG/MIN: 1 INJECTION, SOLUTION, CONCENTRATE INTRAVENOUS at 10:08

## 2023-08-01 RX ADMIN — CEFEPIME 2 G: 2 INJECTION, POWDER, FOR SOLUTION INTRAVENOUS at 10:08

## 2023-08-01 NOTE — Clinical Note
Diagnosis: Shock [243340]   Admitting Provider:: PARK GRIGGS [[id number]]   Future Attending Provider: HAN TRUJILLO [[id number]]   Reason for IP Medical Treatment  (Clinical interventions that can only be accomplished in the IP setting? ) :: shock requiring administration of Levophed   I certify that Inpatient services for greater than or equal to 2 midnights are medically necessary:: Yes   Plans for Post-Acute care--if anticipated (pick the single best option):: A. No post acute care anticipated at this time   Special Needs:: No Special Needs [1]

## 2023-08-02 LAB
ABO + RH BLD: ABNORMAL
ALBUMIN SERPL BCP-MCNC: 1.7 G/DL (ref 3.5–5.2)
ALP SERPL-CCNC: 116 U/L (ref 55–135)
ALT SERPL W/O P-5'-P-CCNC: 14 U/L (ref 10–44)
ANION GAP SERPL CALC-SCNC: 11 MMOL/L (ref 8–16)
AST SERPL-CCNC: 51 U/L (ref 10–40)
BASOPHILS # BLD AUTO: ABNORMAL K/UL (ref 0–0.2)
BASOPHILS NFR BLD: 0 % (ref 0–1.9)
BILIRUB DIRECT SERPL-MCNC: 0.2 MG/DL (ref 0.1–0.3)
BILIRUB SERPL-MCNC: 0.3 MG/DL (ref 0.1–1)
BLD GP AB SCN CELLS X3 SERPL QL: ABNORMAL
BLOOD GROUP ANTIBODIES SERPL: NORMAL
BUN SERPL-MCNC: 54 MG/DL (ref 8–23)
CALCIUM SERPL-MCNC: 7.4 MG/DL (ref 8.7–10.5)
CHLORIDE SERPL-SCNC: 107 MMOL/L (ref 95–110)
CO2 SERPL-SCNC: 23 MMOL/L (ref 23–29)
CREAT SERPL-MCNC: 3.6 MG/DL (ref 0.5–1.4)
DIFFERENTIAL METHOD: ABNORMAL
EOSINOPHIL # BLD AUTO: ABNORMAL K/UL (ref 0–0.5)
EOSINOPHIL NFR BLD: 4 % (ref 0–8)
ERYTHROCYTE [DISTWIDTH] IN BLOOD BY AUTOMATED COUNT: 19.5 % (ref 11.5–14.5)
EST. GFR  (NO RACE VARIABLE): 12 ML/MIN/1.73 M^2
GLUCOSE SERPL-MCNC: 136 MG/DL (ref 70–110)
HCT VFR BLD AUTO: 25.4 % (ref 37–48.5)
HGB BLD-MCNC: 7.6 G/DL (ref 12–16)
IMM GRANULOCYTES # BLD AUTO: ABNORMAL K/UL (ref 0–0.04)
IMM GRANULOCYTES NFR BLD AUTO: ABNORMAL % (ref 0–0.5)
INR PPP: 1.4 (ref 0.8–1.2)
LYMPHOCYTES # BLD AUTO: ABNORMAL K/UL (ref 1–4.8)
LYMPHOCYTES NFR BLD: 11 % (ref 18–48)
MAGNESIUM SERPL-MCNC: 2 MG/DL (ref 1.6–2.6)
MCH RBC QN AUTO: 25.2 PG (ref 27–31)
MCHC RBC AUTO-ENTMCNC: 29.9 G/DL (ref 32–36)
MCV RBC AUTO: 84 FL (ref 82–98)
MONOCYTES # BLD AUTO: ABNORMAL K/UL (ref 0.3–1)
MONOCYTES NFR BLD: 8 % (ref 4–15)
NEUTROPHILS NFR BLD: 65 % (ref 38–73)
NEUTS BAND NFR BLD MANUAL: 12 %
NRBC BLD-RTO: 0 /100 WBC
PHOSPHATE SERPL-MCNC: 7 MG/DL (ref 2.7–4.5)
PLATELET # BLD AUTO: 524 K/UL (ref 150–450)
PMV BLD AUTO: 10 FL (ref 9.2–12.9)
POCT GLUCOSE: 124 MG/DL (ref 70–110)
POTASSIUM SERPL-SCNC: 3.4 MMOL/L (ref 3.5–5.1)
PROT SERPL-MCNC: 5.9 G/DL (ref 6–8.4)
PROTHROMBIN TIME: 14.7 SEC (ref 9–12.5)
RBC # BLD AUTO: 3.02 M/UL (ref 4–5.4)
SODIUM SERPL-SCNC: 141 MMOL/L (ref 136–145)
SPECIMEN OUTDATE: ABNORMAL
WBC # BLD AUTO: 7.41 K/UL (ref 3.9–12.7)

## 2023-08-02 PROCEDURE — 25000003 PHARM REV CODE 250: Performed by: STUDENT IN AN ORGANIZED HEALTH CARE EDUCATION/TRAINING PROGRAM

## 2023-08-02 PROCEDURE — 84100 ASSAY OF PHOSPHORUS: CPT | Performed by: STUDENT IN AN ORGANIZED HEALTH CARE EDUCATION/TRAINING PROGRAM

## 2023-08-02 PROCEDURE — 80048 BASIC METABOLIC PNL TOTAL CA: CPT | Performed by: STUDENT IN AN ORGANIZED HEALTH CARE EDUCATION/TRAINING PROGRAM

## 2023-08-02 PROCEDURE — 94799 UNLISTED PULMONARY SVC/PX: CPT

## 2023-08-02 PROCEDURE — 36556 INSERT NON-TUNNEL CV CATH: CPT

## 2023-08-02 PROCEDURE — 92610 EVALUATE SWALLOWING FUNCTION: CPT

## 2023-08-02 PROCEDURE — 63600175 PHARM REV CODE 636 W HCPCS: Performed by: NURSE PRACTITIONER

## 2023-08-02 PROCEDURE — C1751 CATH, INF, PER/CENT/MIDLINE: HCPCS

## 2023-08-02 PROCEDURE — 27000249 HC VAPOTHERM CIRCUIT

## 2023-08-02 PROCEDURE — 94667 MNPJ CHEST WALL 1ST: CPT

## 2023-08-02 PROCEDURE — 86900 BLOOD TYPING SEROLOGIC ABO: CPT | Performed by: STUDENT IN AN ORGANIZED HEALTH CARE EDUCATION/TRAINING PROGRAM

## 2023-08-02 PROCEDURE — 25000242 PHARM REV CODE 250 ALT 637 W/ HCPCS: Performed by: HOSPITALIST

## 2023-08-02 PROCEDURE — 94761 N-INVAS EAR/PLS OXIMETRY MLT: CPT

## 2023-08-02 PROCEDURE — 83735 ASSAY OF MAGNESIUM: CPT | Performed by: STUDENT IN AN ORGANIZED HEALTH CARE EDUCATION/TRAINING PROGRAM

## 2023-08-02 PROCEDURE — 86870 RBC ANTIBODY IDENTIFICATION: CPT | Performed by: STUDENT IN AN ORGANIZED HEALTH CARE EDUCATION/TRAINING PROGRAM

## 2023-08-02 PROCEDURE — 85610 PROTHROMBIN TIME: CPT | Performed by: STUDENT IN AN ORGANIZED HEALTH CARE EDUCATION/TRAINING PROGRAM

## 2023-08-02 PROCEDURE — 99900035 HC TECH TIME PER 15 MIN (STAT)

## 2023-08-02 PROCEDURE — 25000003 PHARM REV CODE 250: Performed by: HOSPITALIST

## 2023-08-02 PROCEDURE — 63600175 PHARM REV CODE 636 W HCPCS: Performed by: STUDENT IN AN ORGANIZED HEALTH CARE EDUCATION/TRAINING PROGRAM

## 2023-08-02 PROCEDURE — 94640 AIRWAY INHALATION TREATMENT: CPT

## 2023-08-02 PROCEDURE — 85027 COMPLETE CBC AUTOMATED: CPT | Performed by: STUDENT IN AN ORGANIZED HEALTH CARE EDUCATION/TRAINING PROGRAM

## 2023-08-02 PROCEDURE — 27100171 HC OXYGEN HIGH FLOW UP TO 24 HOURS

## 2023-08-02 PROCEDURE — 80076 HEPATIC FUNCTION PANEL: CPT | Performed by: STUDENT IN AN ORGANIZED HEALTH CARE EDUCATION/TRAINING PROGRAM

## 2023-08-02 PROCEDURE — 27000646 HC AEROBIKA DEVICE

## 2023-08-02 PROCEDURE — 93010 EKG 12-LEAD: ICD-10-PCS | Mod: ,,, | Performed by: INTERNAL MEDICINE

## 2023-08-02 PROCEDURE — 25000003 PHARM REV CODE 250: Performed by: EMERGENCY MEDICINE

## 2023-08-02 PROCEDURE — 94664 DEMO&/EVAL PT USE INHALER: CPT

## 2023-08-02 PROCEDURE — 85007 BL SMEAR W/DIFF WBC COUNT: CPT | Performed by: STUDENT IN AN ORGANIZED HEALTH CARE EDUCATION/TRAINING PROGRAM

## 2023-08-02 PROCEDURE — 20000000 HC ICU ROOM

## 2023-08-02 PROCEDURE — 93010 ELECTROCARDIOGRAM REPORT: CPT | Mod: ,,, | Performed by: INTERNAL MEDICINE

## 2023-08-02 RX ORDER — CALCIUM GLUCONATE 20 MG/ML
1 INJECTION, SOLUTION INTRAVENOUS
Status: DISCONTINUED | OUTPATIENT
Start: 2023-08-02 | End: 2023-08-03 | Stop reason: HOSPADM

## 2023-08-02 RX ORDER — POTASSIUM CHLORIDE 7.45 MG/ML
60 INJECTION INTRAVENOUS
Status: DISCONTINUED | OUTPATIENT
Start: 2023-08-02 | End: 2023-08-03 | Stop reason: HOSPADM

## 2023-08-02 RX ORDER — MAGNESIUM SULFATE HEPTAHYDRATE 40 MG/ML
4 INJECTION, SOLUTION INTRAVENOUS
Status: DISCONTINUED | OUTPATIENT
Start: 2023-08-02 | End: 2023-08-03 | Stop reason: HOSPADM

## 2023-08-02 RX ORDER — INSULIN ASPART 100 [IU]/ML
0-5 INJECTION, SOLUTION INTRAVENOUS; SUBCUTANEOUS EVERY 6 HOURS PRN
Status: DISCONTINUED | OUTPATIENT
Start: 2023-08-02 | End: 2023-08-03 | Stop reason: HOSPADM

## 2023-08-02 RX ORDER — POTASSIUM CHLORIDE 7.45 MG/ML
40 INJECTION INTRAVENOUS
Status: DISCONTINUED | OUTPATIENT
Start: 2023-08-02 | End: 2023-08-03 | Stop reason: HOSPADM

## 2023-08-02 RX ORDER — CALCIUM GLUCONATE 20 MG/ML
3 INJECTION, SOLUTION INTRAVENOUS
Status: DISCONTINUED | OUTPATIENT
Start: 2023-08-02 | End: 2023-08-03 | Stop reason: HOSPADM

## 2023-08-02 RX ORDER — MAGNESIUM SULFATE HEPTAHYDRATE 40 MG/ML
2 INJECTION, SOLUTION INTRAVENOUS
Status: DISCONTINUED | OUTPATIENT
Start: 2023-08-02 | End: 2023-08-03 | Stop reason: HOSPADM

## 2023-08-02 RX ORDER — CALCIUM GLUCONATE 20 MG/ML
2 INJECTION, SOLUTION INTRAVENOUS
Status: DISCONTINUED | OUTPATIENT
Start: 2023-08-02 | End: 2023-08-03 | Stop reason: HOSPADM

## 2023-08-02 RX ORDER — GLUCAGON 1 MG
1 KIT INJECTION
Status: DISCONTINUED | OUTPATIENT
Start: 2023-08-02 | End: 2023-08-03 | Stop reason: HOSPADM

## 2023-08-02 RX ORDER — SODIUM CHLORIDE 0.9 % (FLUSH) 0.9 %
10 SYRINGE (ML) INJECTION
Status: DISCONTINUED | OUTPATIENT
Start: 2023-08-02 | End: 2023-08-03 | Stop reason: HOSPADM

## 2023-08-02 RX ORDER — PROCHLORPERAZINE EDISYLATE 5 MG/ML
5 INJECTION INTRAMUSCULAR; INTRAVENOUS EVERY 6 HOURS PRN
Status: DISCONTINUED | OUTPATIENT
Start: 2023-08-02 | End: 2023-08-03 | Stop reason: HOSPADM

## 2023-08-02 RX ORDER — POTASSIUM CHLORIDE 7.45 MG/ML
80 INJECTION INTRAVENOUS
Status: DISCONTINUED | OUTPATIENT
Start: 2023-08-02 | End: 2023-08-03 | Stop reason: HOSPADM

## 2023-08-02 RX ORDER — HEPARIN SODIUM 5000 [USP'U]/ML
5000 INJECTION, SOLUTION INTRAVENOUS; SUBCUTANEOUS EVERY 8 HOURS
Status: DISCONTINUED | OUTPATIENT
Start: 2023-08-02 | End: 2023-08-03 | Stop reason: HOSPADM

## 2023-08-02 RX ORDER — DEXTROSE MONOHYDRATE, SODIUM CHLORIDE, AND POTASSIUM CHLORIDE 50; 1.49; 9 G/1000ML; G/1000ML; G/1000ML
INJECTION, SOLUTION INTRAVENOUS CONTINUOUS
Status: DISCONTINUED | OUTPATIENT
Start: 2023-08-02 | End: 2023-08-03

## 2023-08-02 RX ORDER — ATORVASTATIN CALCIUM 40 MG/1
40 TABLET, FILM COATED ORAL NIGHTLY
Status: DISCONTINUED | OUTPATIENT
Start: 2023-08-03 | End: 2023-08-03 | Stop reason: HOSPADM

## 2023-08-02 RX ORDER — ONDANSETRON 2 MG/ML
4 INJECTION INTRAMUSCULAR; INTRAVENOUS EVERY 8 HOURS PRN
Status: DISCONTINUED | OUTPATIENT
Start: 2023-08-02 | End: 2023-08-03 | Stop reason: HOSPADM

## 2023-08-02 RX ORDER — LIDOCAINE 50 MG/G
1 PATCH TOPICAL
Status: DISCONTINUED | OUTPATIENT
Start: 2023-08-02 | End: 2023-08-03 | Stop reason: HOSPADM

## 2023-08-02 RX ORDER — HYDROMORPHONE HYDROCHLORIDE 1 MG/ML
0.2 INJECTION, SOLUTION INTRAMUSCULAR; INTRAVENOUS; SUBCUTANEOUS EVERY 6 HOURS PRN
Status: DISCONTINUED | OUTPATIENT
Start: 2023-08-02 | End: 2023-08-03

## 2023-08-02 RX ORDER — IPRATROPIUM BROMIDE AND ALBUTEROL SULFATE 2.5; .5 MG/3ML; MG/3ML
3 SOLUTION RESPIRATORY (INHALATION) EVERY 4 HOURS PRN
Status: DISCONTINUED | OUTPATIENT
Start: 2023-08-02 | End: 2023-08-03 | Stop reason: HOSPADM

## 2023-08-02 RX ORDER — FAMOTIDINE 10 MG/ML
20 INJECTION INTRAVENOUS DAILY
Status: DISCONTINUED | OUTPATIENT
Start: 2023-08-02 | End: 2023-08-03 | Stop reason: HOSPADM

## 2023-08-02 RX ORDER — ACETAMINOPHEN 325 MG/1
650 TABLET ORAL EVERY 4 HOURS PRN
Status: DISCONTINUED | OUTPATIENT
Start: 2023-08-02 | End: 2023-08-03 | Stop reason: HOSPADM

## 2023-08-02 RX ORDER — IPRATROPIUM BROMIDE AND ALBUTEROL SULFATE 2.5; .5 MG/3ML; MG/3ML
3 SOLUTION RESPIRATORY (INHALATION)
Status: DISCONTINUED | OUTPATIENT
Start: 2023-08-02 | End: 2023-08-03 | Stop reason: HOSPADM

## 2023-08-02 RX ORDER — TALC
6 POWDER (GRAM) TOPICAL NIGHTLY PRN
Status: DISCONTINUED | OUTPATIENT
Start: 2023-08-02 | End: 2023-08-03 | Stop reason: HOSPADM

## 2023-08-02 RX ORDER — MUPIROCIN 20 MG/G
OINTMENT TOPICAL 2 TIMES DAILY
Status: DISCONTINUED | OUTPATIENT
Start: 2023-08-02 | End: 2023-08-03 | Stop reason: HOSPADM

## 2023-08-02 RX ADMIN — NOREPINEPHRINE BITARTRATE 0.62 MCG/KG/MIN: 1 INJECTION, SOLUTION, CONCENTRATE INTRAVENOUS at 02:08

## 2023-08-02 RX ADMIN — POTASSIUM CHLORIDE 10 MEQ: 7.46 INJECTION, SOLUTION INTRAVENOUS at 08:08

## 2023-08-02 RX ADMIN — HEPARIN SODIUM 5000 UNITS: 5000 INJECTION INTRAVENOUS; SUBCUTANEOUS at 10:08

## 2023-08-02 RX ADMIN — POTASSIUM CHLORIDE 10 MEQ: 7.46 INJECTION, SOLUTION INTRAVENOUS at 11:08

## 2023-08-02 RX ADMIN — HEPARIN SODIUM 5000 UNITS: 5000 INJECTION INTRAVENOUS; SUBCUTANEOUS at 05:08

## 2023-08-02 RX ADMIN — HEPARIN SODIUM 5000 UNITS: 5000 INJECTION INTRAVENOUS; SUBCUTANEOUS at 01:08

## 2023-08-02 RX ADMIN — POTASSIUM CHLORIDE 10 MEQ: 7.46 INJECTION, SOLUTION INTRAVENOUS at 07:08

## 2023-08-02 RX ADMIN — POTASSIUM CHLORIDE 60 MEQ: 7.46 INJECTION, SOLUTION INTRAVENOUS at 06:08

## 2023-08-02 RX ADMIN — FAMOTIDINE 20 MG: 10 INJECTION, SOLUTION INTRAVENOUS at 08:08

## 2023-08-02 RX ADMIN — IPRATROPIUM BROMIDE AND ALBUTEROL SULFATE 3 ML: .5; 3 SOLUTION RESPIRATORY (INHALATION) at 08:08

## 2023-08-02 RX ADMIN — DEXTROSE MONOHYDRATE, SODIUM CHLORIDE, AND POTASSIUM CHLORIDE: 50; 9; 1.49 INJECTION, SOLUTION INTRAVENOUS at 10:08

## 2023-08-02 RX ADMIN — IPRATROPIUM BROMIDE AND ALBUTEROL SULFATE 3 ML: .5; 3 SOLUTION RESPIRATORY (INHALATION) at 02:08

## 2023-08-02 RX ADMIN — SODIUM CHLORIDE 1000 ML: 9 INJECTION, SOLUTION INTRAVENOUS at 02:08

## 2023-08-02 RX ADMIN — POTASSIUM CHLORIDE 10 MEQ: 7.46 INJECTION, SOLUTION INTRAVENOUS at 09:08

## 2023-08-02 RX ADMIN — MUPIROCIN: 20 OINTMENT TOPICAL at 08:08

## 2023-08-02 RX ADMIN — LIDOCAINE PATCH 5% 1 PATCH: 700 PATCH TOPICAL at 01:08

## 2023-08-02 RX ADMIN — CEFEPIME 2 G: 2 INJECTION, POWDER, FOR SOLUTION INTRAVENOUS at 10:08

## 2023-08-02 RX ADMIN — DEXTROSE MONOHYDRATE, SODIUM CHLORIDE, AND POTASSIUM CHLORIDE: 50; 9; 1.49 INJECTION, SOLUTION INTRAVENOUS at 09:08

## 2023-08-02 RX ADMIN — HYDROMORPHONE HYDROCHLORIDE 0.2 MG: 1 INJECTION, SOLUTION INTRAMUSCULAR; INTRAVENOUS; SUBCUTANEOUS at 03:08

## 2023-08-02 RX ADMIN — POTASSIUM CHLORIDE 10 MEQ: 7.46 INJECTION, SOLUTION INTRAVENOUS at 10:08

## 2023-08-02 NOTE — PROGRESS NOTES
Pharmacokinetic Initial Assessment: IV Vancomycin    Assessment/Plan:    Initiate intravenous vancomycin with loading dose of 1000 mg once with subsequent doses when random concentrations are less than 20 mcg/mL  Desired empiric serum trough concentration is 10 to 20 mcg/mL  Draw vancomycin random level on 8/3 at 0400.  Pharmacy will continue to follow and monitor vancomycin.      Please contact pharmacy at extension [phone] with any questions regarding this assessment.     Thank you for the consult,   Rigo OMAYRA David       Patient brief summary:  Nelsy Marino is a 82 y.o. female initiated on antimicrobial therapy with IV Vancomycin for treatment of suspected bacteremia    Drug Allergies:   Review of patient's allergies indicates:   Allergen Reactions    Motrin [ibuprofen] Itching       Actual Body Weight:   58 kg    Renal Function:   Estimated Creatinine Clearance: 9 mL/min (A) (based on SCr of 3.8 mg/dL (H)).,     Dialysis Method (if applicable):  N/A    CBC (last 72 hours):  Recent Labs   Lab Result Units 08/01/23  0914 08/01/23  2129   WBC K/uL 7.28 5.61   Hemoglobin g/dL 7.8* 7.5*   Hematocrit % 25.5* 24.6*   Platelets K/uL 425 397   Gran % % 72.9 73.7*   Lymph % % 14.6* 13.7*   Mono % % 5.4 5.7   Eosinophil % % 6.3 6.1   Basophil % % 0.4 0.4   Differential Method  Automated Automated       Metabolic Panel (last 72 hours):  Recent Labs   Lab Result Units 08/01/23  0914 08/01/23  0932 08/01/23  2129 08/01/23  2250   Sodium mmol/L 142  --  143  --    Potassium mmol/L 3.3*  --  3.6  --    Chloride mmol/L 104  --  106  --    CO2 mmol/L 28  --  24  --    Glucose mg/dL 80  --  79  --    Glucose, UA   --  Negative  --  Negative   BUN mg/dL 57*  --  56*  --    Creatinine mg/dL 3.5*  --  3.8*  --    Albumin g/dL 1.9*  --  1.7*  --    Total Bilirubin mg/dL 0.4  --  0.3  --    Alkaline Phosphatase U/L 121  --  114  --    AST U/L 50*  --  46*  --    ALT U/L 12  --  11  --    Magnesium mg/dL  --   --  2.1  --        Drug  "levels (last 3 results):  No results for input(s): "VANCOMYCINRA", "VANCORANDOM", "VANCOMYCINPE", "VANCOPEAK", "VANCOMYCINTR", "VANCOTROUGH" in the last 72 hours.    Microbiologic Results:  Microbiology Results (last 7 days)       Procedure Component Value Units Date/Time    Urine culture [[id number]] Collected: 08/01/23 2250    Order Status: No result Specimen: Urine Updated: 08/01/23 2323    Blood culture #2 **CANNOT BE ORDERED STAT** [[id number]] Collected: 08/01/23 2145    Order Status: Sent Specimen: Blood from Peripheral, Antecubital, Right Updated: 08/01/23 2228    Blood culture #1 **CANNOT BE ORDERED STAT** [[id number]] Collected: 08/01/23 2215    Order Status: Sent Specimen: Blood from Peripheral, Forearm, Right Updated: 08/01/23 2227            "

## 2023-08-02 NOTE — PLAN OF CARE
Case Management Assessment     PCP: [facility]    Pharmacy:   [facility] - [place], [place] - [address]  [address]  [place] [address]  Phone: [phone] Fax: [phone]    [facility] - [place], [place] - [address]  [address]  [facility] [address]  Phone: [phone] Fax: [phone]    [facility] [place]  [address]  [address]   [place] [address]  Phone: [phone] Fax: [phone]        Patient Arrived From: Home  Existing Help at Home: Spouse    Barriers to Discharge: No barriers to discharge.     Discharge Plan:    A. Home    B. Hospice       08/02/23 1519   Discharge Assessment   Assessment Type Discharge Planning Assessment   Confirmed/corrected address, phone number and insurance Yes   Confirmed Demographics Correct on Facesheet   Source of Information health record   Communicated JUAN ALBERTO with patient/caregiver Date not available/Unable to determine   Reason For Admission Septic shock   People in Home spouse   Do you expect to return to your current living situation? Yes   Do you have help at home or someone to help you manage your care at home? Yes   Who are your caregiver(s) and their phone number(s)? Spouse, Dimas Marino [phone]   Prior to hospitilization cognitive status: Alert/Oriented   Current cognitive status: Alert/Oriented   Equipment Currently Used at Home wheelchair   Readmission within 30 days? No   Patient currently being followed by outpatient case management? No   Do you currently have service(s) that help you manage your care at home? Yes   [facility]   Do you take prescription medications? Yes   Do you have prescription coverage? Yes   Coverage [employer]   Do you have any problems affording any of your prescribed medications? No   Is the patient taking medications as prescribed? yes   Who is going to help you get home at discharge? Spouse, Dimas Marino  [phone]   How do you get to doctors appointments? family or friend will provide   Are you on dialysis? No   Do you take coumadin? No   Discharge Plan A Home   Discharge Plan B Hospice/home   DME Needed Upon Discharge  none   Transition of Care Barriers None

## 2023-08-02 NOTE — PLAN OF CARE
Problem: Infection  Goal: Absence of Infection Signs and Symptoms  Outcome: Ongoing, Progressing     Problem: Adult Inpatient Plan of Care  Goal: Plan of Care Review  Outcome: Ongoing, Progressing  Goal: Patient-Specific Goal (Individualized)  Outcome: Ongoing, Progressing  Goal: Absence of Hospital-Acquired Illness or Injury  Outcome: Ongoing, Progressing  Goal: Optimal Comfort and Wellbeing  Outcome: Ongoing, Progressing  Goal: Readiness for Transition of Care  Outcome: Ongoing, Progressing     Problem: Diabetes Comorbidity  Goal: Blood Glucose Level Within Targeted Range  Outcome: Ongoing, Progressing     Problem: Adjustment to Illness (Sepsis/Septic Shock)  Goal: Optimal Coping  Outcome: Ongoing, Progressing     Problem: Bleeding (Sepsis/Septic Shock)  Goal: Absence of Bleeding  Outcome: Ongoing, Progressing     Problem: Glycemic Control Impaired (Sepsis/Septic Shock)  Goal: Blood Glucose Level Within Desired Range  Outcome: Ongoing, Progressing     Problem: Infection Progression (Sepsis/Septic Shock)  Goal: Absence of Infection Signs and Symptoms  Outcome: Ongoing, Progressing     Problem: Nutrition Impaired (Sepsis/Septic Shock)  Goal: Optimal Nutrition Intake  Outcome: Ongoing, Progressing     Problem: Fluid and Electrolyte Imbalance (Acute Kidney Injury/Impairment)  Goal: Fluid and Electrolyte Balance  Outcome: Ongoing, Progressing     Problem: Oral Intake Inadequate (Acute Kidney Injury/Impairment)  Goal: Optimal Nutrition Intake  Outcome: Ongoing, Progressing     Problem: Renal Function Impairment (Acute Kidney Injury/Impairment)  Goal: Effective Renal Function  Outcome: Ongoing, Progressing     Problem: Impaired Wound Healing  Goal: Optimal Wound Healing  Outcome: Ongoing, Progressing     Problem: Skin Injury Risk Increased  Goal: Skin Health and Integrity  Outcome: Ongoing, Progressing     Problem: Fall Injury Risk  Goal: Absence of Fall and Fall-Related Injury  Outcome: Ongoing, Progressing

## 2023-08-02 NOTE — PT/OT/SLP EVAL
Speech Language Pathology Evaluation  Bedside Swallow    Patient Name:  Nelsy Marino   MRN:  [id number]  Admitting Diagnosis: Septic shock    Recommendations:                 General Recommendations:  Dysphagia therapy, address thrush  Diet recommendations:  Puree, Thin   Aspiration Precautions: 1 bite/sip at a time , reflux precautions  General Precautions: Standard, aspiration  Communication strategies:  redirection    Assessment:     Nelsy Marino is a 82 y.o. female with dx of sepsis, she presents with oral dysphagia negatively impacted by thrush and variable SANTO. Possible esophageal component.     History:     Past Medical History:   Diagnosis Date    Amputation of toes     CHF (congestive heart failure)     Diabetes mellitus     DVT (deep venous thrombosis)     Hypertension     Miscarriage     On home oxygen therapy     PRN       Past Surgical History:   Procedure Laterality Date     femoral vascular surgery       ANGIOGRAPHY OF LOWER EXTREMITY Right 2/23/2023    Procedure: Angiogram Extremity Unilateral;  Surgeon: Danny Dunbar MD;  Location: [facility] OR;  Service: Vascular;  Laterality: Right;  RN PHONE PREOP 2/20/2023----CK CONSENT    ANGIOGRAPHY OF LOWER EXTREMITY Right 4/4/2023    Procedure: Angiogram Extremity Unilateral - possible endovascular intervention;  Surgeon: René Simms MD;  Location: [facility] OR [address] [place] [address];  Service: Vascular;  Laterality: Right;  19.6 mins.  740.81 mGy  73.3399 Gy.cm  58ml dye  local - 1ml    COLONOSCOPY N/A 5/18/2023    Procedure: COLONOSCOPY;  Surgeon: Cuong Aguiar MD;  Location: [facility] ENDO;  Service: Endoscopy;  Laterality: N/A;    CREATION OF FEMORAL-FEMORAL ARTERY BYPASS WITH GRAFT Bilateral 3/2/2023    Procedure: CREATION, BYPASS, ARTERIAL, FEMORAL TO FEMORAL, USING GRAFT, LEFT FEMORAL ENDARTERECTOMY, LEFT ILIAC ARTERY STENT PLACEMENT, RIGHT LOWER EXTREMITY ANGIOGRAM AND RIGHT SFA  ANGIOPLASTY;  Surgeon: Danny Dunbar MD;  Location: [facility] OR [address] [place] [address];   Service: Vascular;  Laterality: Bilateral;   744.47 MGY  175.36 Gycm  Flouro time 20.5 mins      CREATION OF ILIOFEMORAL ARTERY BYPASS Right 3/20/2019    Procedure: CREATION, BYPASS, ARTERIAL, ILIAC TO FEMORAL, RIGHT LOWER EXTREMITY, RIGHT PROFUNDAPLASTY;  Surgeon: Danny Dunbar MD;  Location: [facility] OR;  Service: Vascular;  Laterality: Right;  11:00AM START PER NAZANIN RICHARDS PREOP 3/13/2019  ACT'S, CELL SAVER, GRAFTS, BOOK WATER---NEED H/P  CONSENT IN AM------HGBA1C    CREATION OF MUSCLE ROTATIONAL FLAP Left 3/31/2023    Procedure: CREATION, FLAP, MUSCLE ROTATION;  Surgeon: Brandon Smiley MD;  Location: [facility] OR 2ND FLR;  Service: Plastics;  Laterality: Left;    ENDOSCOPY OF PROXIMAL SMALL INTESTINE N/A 5/18/2023    Procedure: ENTEROSCOPY, PROXIMAL;  Surgeon: Cuong Aguiar MD;  Location: [facility];  Service: Endoscopy;  Laterality: N/A;    ESOPHAGOGASTRODUODENOSCOPY N/A 3/17/2023    Procedure: EGD (ESOPHAGOGASTRODUODENOSCOPY);  Surgeon: Gee Rodriguez MD;  Location: [facility] ENDO (2ND FLR);  Service: Endoscopy;  Laterality: N/A;    ESOPHAGOGASTRODUODENOSCOPY N/A 4/11/2023    Procedure: EGD (ESOPHAGOGASTRODUODENOSCOPY);  Surgeon: Nazanin Kaiser MD;  Location: [facility] ENDO (2ND FLR);  Service: Endoscopy;  Laterality: N/A;    ESOPHAGOGASTRODUODENOSCOPY N/A 4/26/2023    Procedure: EGD (ESOPHAGOGASTRODUODENOSCOPY);  Surgeon: Gee Rodriguez MD;  Location: [facility] ENDO (2ND FLR);  Service: Endoscopy;  Laterality: N/A;    ESOPHAGOGASTRODUODENOSCOPY N/A 5/2/2023    Procedure: EGD (ESOPHAGOGASTRODUODENOSCOPY);  Surgeon: Cuong Conteh MD;  Location: [facility] ENDO (2ND FLR);  Service: Endoscopy;  Laterality: N/A;    FOOT AMPUTATION THROUGH METATARSAL Right 3/4/2023    Procedure: AMPUTATION, FOOT, TRANSMETATARSAL;  Surgeon: Benjamin Godfrey DPM;  Location: [facility] OR [address] [place] [address];  Service: Podiatry;  Laterality: Right;    HYSTERECTOMY      ?unsure cervix present    INCISION AND DRAINAGE Right 4/12/2019     Procedure: Incision and Drainage - R groin washout, possible muscle flap;  Surgeon: Danny Dunbar MD;  Location: [facility] OR 2ND FLR;  Service: Vascular;  Laterality: Right;  groin washout    INCISION AND DRAINAGE OF GROIN Right 5/3/2019    Procedure: Incision and Drainage R groin;  Surgeon: Danny Dunbar MD;  Location: [facility] OR 2ND FLR;  Service: Vascular;  Laterality: Right;    WOUND DEBRIDEMENT Left 3/31/2023    Procedure: DEBRIDEMENT, WOUND;  Surgeon: René Simms MD;  Location: [facility] OR 2ND FLR;  Service: Vascular;  Laterality: Left;       Social History: Patient lives with [facility]    Chest X-Rays: 8/2/23 Pulmonary edema pneumonia aspiration or sepsis worse from the prior study.    7/10/23 CT of abdomen revealed evidence of reflux.     Prior diet: unrestricted  Subjective   Pt's  reporting that for the last 2 months patient has gradually begun refusing po and losing appetite. He reports in home environment she often eats laying down or eats and then immediate lays down.   Patient goals: Pt unable to report    Pain/Comfort:  Pain Rating 1: 0/10    Respiratory Status: High flow, flow 35 L/min, concentration 50%    Objective:     Oral Musculature Evaluation  Oral Musculature: general weakness (malocculsion)  Dentition: edentulous  Secretion Management: adequate  Mucosal Quality: good  Mandibular Strength and Mobility: WFL  Oral Labial Strength and Mobility: WFL  Lingual Strength and Mobility: WFL (thrush)  Voice Prior to PO Intake: wfl  Oral Musculature Comments: gorssly symmetrical, lingual thrush    Bedside Swallow Eval:   Consistencies Assessed:  Thin liquids x5 via straw  Puree x3      Oral Phase:   WFL  Pt not accepting much    Pharyngeal Phase:   no overt clinical signs/symptoms of aspiration    Compensatory Strategies  None    Treatment: please note silent aspiration cannot be r/o at bedside..     Goals:   Multidisciplinary Problems       SLP Goals          Problem: SLP    Goal Priority  Disciplines Outcome   SLP Goal    Low SLP Ongoing, Progressing   Description: Pt will participate in ongoing assessment of swallow                       Plan:     Patient to be seen:  2 x/week   Plan of Care expires:  08/09/23  Plan of Care reviewed with:  patient   SLP Follow-Up:  Yes       Discharge recommendations:  other (see comments) (tbd)   Barriers to Discharge:  Level of Skilled Assistance Needed .    Time Tracking:     SLP Treatment Date:   08/02/23  Speech Start Time:  1500  Speech Stop Time:  1515     Speech Total Time (min):  15 min    Billable Minutes: Eval Swallow and Oral Function 15    08/02/2023

## 2023-08-02 NOTE — ED PROVIDER NOTES
"Encounter Date: 8/1/2023    SCRIBE #1 NOTE: I, Steve Collins, am scribing for, and in the presence of,  Kashmir Spear MD. I have scribed the following portions of the note - Other sections scribed: HPI, ROS, PE.       History     Chief Complaint   Patient presents with    Abnormal Lab     Pt BIB EMS for abnormal lab. EMS reported Pt was sent home today on hospice. EMS reported family canceled hospice and stated Dr. Clifton stated Pt's labs were low and to return to ED.     Nelsy Marino is a 82 y.o. female with a PMHx of CHF, DVT, HTN, DM, and right BAA, who presents to the ED via EMS for evaluation of abnormal lab results today. Patient was previously seen in the ED earlier today after one of her family members had reported her to be possibly overmedicated. Upon discharged from the ED, patient had initially been placed into hospice, but hospice equipment was refused and patient was no longer going to be admitted to hospice, with family declining hospice admission. History provided by independent historian, patient's family, reports they received a call from the ED telling them to return due to abnormal lab results. According to patient's daughter, patient has been generally weak and fatigued since Monday. Daughter further endorses patient received pain medication at 4 PM. She endorses patient "sometimes" talks at baseline. She additionally endorses patient underwent triple bypass on March 9, 2023, and had her right toes amputated on March 11, 2023. She denies compliance with fentanyl patches. She also states patient's  is her current POA. No medications taken PTA. No alleviating or exacerbating factors noted.     The history is provided by a relative. No  was used.     Review of patient's allergies indicates:   Allergen Reactions    Motrin [ibuprofen] Itching     Past Medical History:   Diagnosis Date    Amputation of toes     CHF (congestive heart failure)     Diabetes mellitus     " DVT (deep venous thrombosis)     Hypertension     Miscarriage     On home oxygen therapy     PRN     Past Surgical History:   Procedure Laterality Date     femoral vascular surgery       ANGIOGRAPHY OF LOWER EXTREMITY Right 2/23/2023    Procedure: Angiogram Extremity Unilateral;  Surgeon: Danny Dunbar MD;  Location: [facility] OR;  Service: Vascular;  Laterality: Right;  RN PHONE PREOP 2/20/2023----CK CONSENT    ANGIOGRAPHY OF LOWER EXTREMITY Right 4/4/2023    Procedure: Angiogram Extremity Unilateral - possible endovascular intervention;  Surgeon: René Simms MD;  [facility];  Service: Vascular;  Laterality: Right;  19.6 mins.  740.81 mGy  73.3399 Gy.cm  58ml dye  local - 1ml    COLONOSCOPY N/A 5/18/2023    Procedure: COLONOSCOPY;  Surgeon: Cuong Aguiar MD;  Location: [facility] ENDO;  Service: Endoscopy;  Laterality: N/A;    CREATION OF FEMORAL-FEMORAL ARTERY BYPASS WITH GRAFT Bilateral 3/2/2023    Procedure: CREATION, BYPASS, ARTERIAL, FEMORAL TO FEMORAL, USING GRAFT, LEFT FEMORAL ENDARTERECTOMY, LEFT ILIAC ARTERY STENT PLACEMENT, RIGHT LOWER EXTREMITY ANGIOGRAM AND RIGHT SFA  ANGIOPLASTY;  Surgeon: Danny Dunbar MD;  [facility];  Service: Vascular;  Laterality: Bilateral;   744.47 MGY  175.36 Gycm  Flouro time 20.5 mins      CREATION OF ILIOFEMORAL ARTERY BYPASS Right 3/20/2019    Procedure: CREATION, BYPASS, ARTERIAL, ILIAC TO FEMORAL, RIGHT LOWER EXTREMITY, RIGHT PROFUNDAPLASTY;  Surgeon: Danny Dunbar MD;  Location: [facility] OR;  Service: Vascular;  Laterality: Right;  11:00AM START PER ANNE  RN PREOP 3/13/2019  ACT'S, CELL SAVER, GRAFTS, BOOK WATER---NEED H/P  CONSENT IN AM------HGBA1C    CREATION OF MUSCLE ROTATIONAL FLAP Left 3/31/2023    Procedure: CREATION, FLAP, MUSCLE ROTATION;  Surgeon: Brandon Smiley MD;  [facility];  Service: Plastics;  Laterality: Left;    ENDOSCOPY OF PROXIMAL SMALL INTESTINE N/A 5/18/2023    Procedure:  ENTEROSCOPY, PROXIMAL;  Surgeon: Cuong Aguiar MD;  Location: [facility] ENDO;  Service: Endoscopy;  Laterality: N/A;    ESOPHAGOGASTRODUODENOSCOPY N/A 3/17/2023    Procedure: EGD (ESOPHAGOGASTRODUODENOSCOPY);  Surgeon: Gee Rodriguez MD;  Location: [facility] ENDO (2ND FLR);  Service: Endoscopy;  Laterality: N/A;    ESOPHAGOGASTRODUODENOSCOPY N/A 4/11/2023    Procedure: EGD (ESOPHAGOGASTRODUODENOSCOPY);  Surgeon: Nazanin Kaiser MD;  Location: [facility] ENDO (2ND FLR);  Service: Endoscopy;  Laterality: N/A;    ESOPHAGOGASTRODUODENOSCOPY N/A 4/26/2023    Procedure: EGD (ESOPHAGOGASTRODUODENOSCOPY);  Surgeon: Gee Rodriguez MD;  Location: [facility] (2ND FLR);  Service: Endoscopy;  Laterality: N/A;    ESOPHAGOGASTRODUODENOSCOPY N/A 5/2/2023    Procedure: EGD (ESOPHAGOGASTRODUODENOSCOPY);  Surgeon: Cuong Conteh MD;  Location: [facility] (2ND FLR);  Service: Endoscopy;  Laterality: N/A;    FOOT AMPUTATION THROUGH METATARSAL Right 3/4/2023    Procedure: AMPUTATION, FOOT, TRANSMETATARSAL;  Surgeon: Benjamin Godfrey DPM;  Location: [address];  Service: Podiatry;  Laterality: Right;    HYSTERECTOMY      ?unsure cervix present    INCISION AND DRAINAGE Right 4/12/2019    Procedure: Incision and Drainage - R groin washout, possible muscle flap;  Surgeon: Danny Dunbar MD;  [address]  Service: Vascular;  Laterality: Right;  groin washout    INCISION AND DRAINAGE OF GROIN Right 5/3/2019    Procedure: Incision and Drainage R groin;  Surgeon: Danny Dunbar MD;  Location: [address];  Service: Vascular;  Laterality: Right;    WOUND DEBRIDEMENT Left 3/31/2023    Procedure: DEBRIDEMENT, WOUND;  Surgeon: René Simms MD;  [address];  Service: Vascular;  Laterality: Left;     Family History   Problem Relation Age of Onset    Diabetes Father     Hypertension Father      Social History     Tobacco Use    Smoking status: Former     Current packs/day: 0.00     Types:  Cigarettes     Quit date:      Years since quittin.6    Smokeless tobacco: Never   Substance Use Topics    Alcohol use: No    Drug use: No     Review of Systems   Constitutional:  Positive for fatigue. Negative for chills and fever.   HENT:  Negative for congestion, rhinorrhea, sinus pressure and sore throat.    Eyes:  Negative for discharge and redness.   Respiratory:  Negative for cough and shortness of breath.    Cardiovascular:  Negative for chest pain.   Gastrointestinal:  Negative for abdominal pain, constipation, diarrhea, nausea and vomiting.   Genitourinary:  Negative for dysuria, hematuria, pelvic pain, vaginal bleeding and vaginal discharge.   Musculoskeletal:  Negative for arthralgias, back pain and myalgias.   Skin:  Negative for rash and wound.   Neurological:  Positive for weakness. Negative for headaches.   Hematological:  Does not bruise/bleed easily.   Psychiatric/Behavioral:  Negative for confusion. The patient is not nervous/anxious.        Physical Exam     Initial Vitals   BP Pulse Resp Temp SpO2   23 2242 23   (!) 94/57 60 16 96.1 °F (35.6 °C) 95 %      MAP       --                Physical Exam    Nursing note and vitals reviewed.  Constitutional: She appears lethargic. She is not diaphoretic. No distress.   Appears weak, altered, confused.    HENT:   Head: Normocephalic and atraumatic.   Mouth/Throat: Oropharynx is clear and moist.   Eyes: Conjunctivae and EOM are normal. Pupils are equal, round, and reactive to light. Right eye exhibits no discharge. Left eye exhibits no discharge. No scleral icterus.   Neck: Neck supple. No thyromegaly present. No tracheal deviation present. No JVD present.   Normal range of motion.  Cardiovascular:  Normal rate, regular rhythm and normal heart sounds.     Exam reveals no gallop and no friction rub.       No murmur heard.  Weak distal pulses.   Pulmonary/Chest: Breath sounds normal. No  stridor. No respiratory distress. She has no wheezes. She has no rhonchi. She has no rales. She exhibits no tenderness.   On 4L O2 NC.   Abdominal: Abdomen is soft. She exhibits no distension and no mass. There is no abdominal tenderness. There is no rebound and no guarding.   Musculoskeletal:         General: No tenderness or edema. Normal range of motion.      Cervical back: Normal range of motion and neck supple.      Right Lower Extremity: Right leg is amputated below ankle.     Lymphadenopathy:     She has no cervical adenopathy.   Neurological: She is oriented to person, place, and time. She has normal strength. She appears lethargic. GCS eye subscore is 4. GCS verbal subscore is 5. GCS motor subscore is 6.   Confused and lethargic but ANTHONY with no facial asymmetry present   Skin: Skin is warm. Capillary refill takes less than 2 seconds. No rash and no abscess noted. No erythema. No pallor.   Sore to the left heel. See attached media below.                ED Course   Critical Care    Date/Time: 8/1/2023 11:43 PM    Performed by: Kashmir Spear MD  Authorized by: Jerilyn Chairez MD  Direct patient critical care time: 30 minutes  Additional history critical care time: 10 minutes  Ordering / reviewing critical care time: 15 minutes  Documentation critical care time: 10 minutes  Consulting other physicians critical care time: 10 minutes  Consult with family critical care time: 10 minutes  Other critical care time: 0 minutes  Total critical care time (exclusive of procedural time) : 85 minutes  Critical care time was exclusive of separately billable procedures and treating other patients and teaching time.  Critical care was necessary to treat or prevent imminent or life-threatening deterioration of the following conditions: shock, sepsis and dehydration (Shock likely secondary to sepsis due to UTI. Whit present likely secondary to dehydration as well.).  Critical care was time spent personally by me on the  following activities: discussions with consultants, development of treatment plan with patient or surrogate, evaluation of patient's response to treatment, examination of patient, obtaining history from patient or surrogate, ordering and performing treatments and interventions, ordering and review of laboratory studies, ordering and review of radiographic studies, pulse oximetry, re-evaluation of patient's condition and review of old charts.      Central Line    Date/Time: 8/2/2023 1:49 AM    Performed by: Kashmir Spear MD  Authorized by: Jerilyn Chairez MD    Location procedure was performed:  [facility] EMERGENCY DEPARTMENT  Pre-operative diagnosis:  Shock  Post-operative diagnosis:  Shock  Consent Done ?:  Emergent Situation  Time out complete?: Verified correct patient, procedure, equipment, staff, and site/side    Indications:  Med administration and vascular access  Anesthesia:  Local infiltration  Local anesthetic:  Lidocaine 1% without epinephrine  Anesthetic total (ml):  5  Preparation:  Skin prepped with ChloraPrep  Skin prep agent dried: Skin prep agent completely dried prior to procedure    Sterile barriers: All five maximal sterile barriers used - gloves, gown, cap, mask and large sterile sheet    Hand hygiene: Hand hygiene performed immediately prior to central venous catheter insertion    Location:  Right internal jugular  Catheter size:  7 Fr  Inserted Catheter Length (cm):  14  Ultrasound guidance: Yes    Vessel Caliber:  Medium   patent  Comprressibility:  Normal  Doppler:  Flow caudad  Needle advanced into vessel with real time ultrasound guidance.    Guidewire confirmed in vessel.    Steril sheath on probe.    Sterile gel used.  Manometry: Yes    Number of attempts:  1  Securement:  Line sutured, chlorhexidine patch, sterile dressing applied and blood return through all ports  Complications: No    Estimated blood loss (mL):  3  Specimens: No    Implants: No    XRay:  Placement verified by  x-ray, no pneumothorax on x-ray and successful placement  Adverse Events:  NoneTermination Site: right atrium    Labs Reviewed   CBC W/ AUTO DIFFERENTIAL - Abnormal; Notable for the following components:       Result Value    RBC 2.98 (*)     Hemoglobin 7.5 (*)     Hematocrit 24.6 (*)     MCH 25.2 (*)     MCHC 30.5 (*)     RDW 19.6 (*)     Lymph # 0.8 (*)     Gran % 73.7 (*)     Lymph % 13.7 (*)     All other components within normal limits   COMPREHENSIVE METABOLIC PANEL - Abnormal; Notable for the following components:    BUN 56 (*)     Creatinine 3.8 (*)     Calcium 7.7 (*)     Total Protein 5.9 (*)     Albumin 1.7 (*)     AST 46 (*)     eGFR 11 (*)     All other components within normal limits   URINALYSIS, REFLEX TO URINE CULTURE - Abnormal; Notable for the following components:    Color, UA Brown (*)     Appearance, UA Cloudy (*)     Protein, UA 1+ (*)     Ketones, UA Trace (*)     Urobilinogen, UA 4.0-6.0 (*)     Leukocytes, UA 3+ (*)     All other components within normal limits    Narrative:     Specimen Source->Urine   B-TYPE NATRIURETIC PEPTIDE - Abnormal; Notable for the following components:    BNP 2,091 (*)     All other components within normal limits   TROPONIN I - Abnormal; Notable for the following components:    Troponin I 0.124 (*)     All other components within normal limits   PROCALCITONIN - Abnormal; Notable for the following components:    Procalcitonin 1.11 (*)     All other components within normal limits   URINALYSIS MICROSCOPIC - Abnormal; Notable for the following components:    RBC, UA 41 (*)     WBC, UA >100 (*)     WBC Clumps, UA Few (*)     Yeast, UA Moderate (*)     Hyaline Casts, UA 23 (*)     All other components within normal limits    Narrative:     Specimen Source->Urine   ISTAT PROCEDURE - Abnormal; Notable for the following components:    POC BUN 48 (*)     POC Creatinine 4.2 (*)     POC Anion Gap 17 (*)     POC Ionized Calcium 0.94 (*)     POC Hematocrit 24 (*)     All  other components within normal limits   ISTAT PROCEDURE - Abnormal; Notable for the following components:    POC PH 7.315 (*)     POC PCO2 48.8 (*)     POC PO2 66 (*)     POC SATURATED O2 91 (*)     All other components within normal limits   CULTURE, BLOOD   CULTURE, BLOOD   CULTURE, URINE   LIPASE   LACTIC ACID, PLASMA   TROPONIN I   PROCALCITONIN   B-TYPE NATRIURETIC PEPTIDE   MAGNESIUM   MAGNESIUM   HEPATIC FUNCTION PANEL   PROTIME-INR   PHOSPHORUS   MAGNESIUM   BASIC METABOLIC PANEL   CBC W/ AUTO DIFFERENTIAL   TYPE & SCREEN   POCT GLUCOSE   ISTAT CHEM8   POCT GLUCOSE MONITORING CONTINUOUS   POCT GLUCOSE MONITORING CONTINUOUS     EKG Readings: (Independently Interpreted)   Initial Reading: No STEMI. Rhythm: Sinus Bradycardia. Heart Rate: 56. Ectopy: No Ectopy. Conduction: Normal. ST Segments: Normal ST Segments. T Waves Flipped: V1 and V2. Axis: Normal. Clinical Impression: Sinus Bradycardia       Imaging Results              X-Ray Chest 1 View (In process)                      X-Ray Chest AP Portable (Final result)  Result time 08/01/23 23:39:04      Final result by Benjamin Lucas MD (08/01/23 23:39:04)                   Impression:      Mild progression of radiographic findings when compared to the prior examination, as discussed above.      Electronically signed by: Benjamin Lucas  Date:    08/01/2023  Time:    23:39               Narrative:    EXAMINATION:  XR CHEST AP PORTABLE August 1, 2023, 22:33 hours    CLINICAL HISTORY:  Shock, unspecified    TECHNIQUE:  Single frontal view of the chest was performed.    COMPARISON:  Chest radiograph August 1, 2023, 1024 hours    FINDINGS:  Single portable chest view is submitted.  There is diminished depth of inspiration and the patient is rotated.  When accounting for position and technique and depth of inspiration, the appearance of the cardiomediastinal silhouette is stable.    Central pulmonary vascular prominence, this again noted.  There is bilateral  pattern of perihilar infiltrate and bilateral pattern of interstitial and alveolar infiltrate, with mild progression.  Bilateral pleural effusions are again noted, thought stable when accounting for position and technique and depth of inspiration.  There is no evidence for pneumothorax.    The osseous structures demonstrate chronic change.                                       Medications   NORepinephrine 32 mg in dextrose 5 % (D5W) 250 mL infusion (0.5 mcg/kg/min × 58 kg Intravenous Verify Only 8/1/23 2242)   vancomycin - pharmacy to dose (has no administration in time range)   mupirocin 2 % ointment (has no administration in time range)   atorvastatin tablet 40 mg (has no administration in time range)   sodium chloride 0.9% flush 10 mL (has no administration in time range)   acetaminophen tablet 650 mg (has no administration in time range)   famotidine (PF) injection 20 mg (has no administration in time range)   ondansetron injection 4 mg (has no administration in time range)   prochlorperazine injection Soln 5 mg (has no administration in time range)   melatonin tablet 6 mg (has no administration in time range)   potassium chloride 10 mEq in 100 mL IVPB (has no administration in time range)     And   potassium chloride 10 mEq in 100 mL IVPB (has no administration in time range)     And   potassium chloride 10 mEq in 100 mL IVPB (has no administration in time range)   magnesium sulfate 2g in water 50mL IVPB (premix) (has no administration in time range)   magnesium sulfate 2g in water 50mL IVPB (premix) (has no administration in time range)   sodium phosphate 15 mmol in dextrose 5 % (D5W) 250 mL IVPB (has no administration in time range)   sodium phosphate 20.01 mmol in dextrose 5 % (D5W) 250 mL IVPB (has no administration in time range)   sodium phosphate 30 mmol in dextrose 5 % (D5W) 250 mL IVPB (has no administration in time range)   calcium gluconate 1 g in NS IVPB (premixed) (has no administration in time  range)   calcium gluconate 1 g in NS IVPB (premixed) (has no administration in time range)   calcium gluconate 1 g in NS IVPB (premixed) (has no administration in time range)   albuterol-ipratropium 2.5 mg-0.5 mg/3 mL nebulizer solution 3 mL (has no administration in time range)   heparin (porcine) injection 5,000 Units (has no administration in time range)   ceFEPIme (MAXIPIME) 2 g in dextrose 5 % in water (D5W) 100 mL IVPB (MB+) (has no administration in time range)   sodium chloride 0.9% bolus 1,000 mL 1,000 mL (has no administration in time range)   dextrose 50% injection 12.5 g (has no administration in time range)   glucagon (human recombinant) injection 1 mg (has no administration in time range)   insulin aspart U-100 pen 0-5 Units (has no administration in time range)   sodium chloride 0.9% bolus 1,000 mL 1,000 mL (0 mLs Intravenous Stopped 8/1/23 2230)   ceFEPIme (MAXIPIME) 2 g in dextrose 5 % in water (D5W) 100 mL IVPB (MB+) (0 g Intravenous Stopped 8/1/23 2245)   vancomycin (VANCOCIN) 1,000 mg in dextrose 5 % (D5W) 250 mL IVPB (Vial-Mate) (0 mg Intravenous Stopped 8/2/23 0111)     Medical Decision Making:   History:   I obtained history from: someone other than patient.       <> Summary of History: History provided by independent historian, patient's family.  Old Medical Records: I decided to obtain old medical records.  Old Records Summarized: other records.       <> Summary of Records: External documents reviewed.  Independently Interpreted Test(s):   I have ordered and independently interpreted EKG Reading(s) - see prior notes  Clinical Tests:   Lab Tests: Ordered and Reviewed  Medical Tests: Ordered and Reviewed          Scribe Attestation:   Scribe #1: I performed the above scribed service and the documentation accurately describes the services I performed. I attest to the accuracy of the note.        ED Course as of 08/02/23 0154   Tue Aug 01, 2023   2148 Spoke to the patient's , Dimas  Ned, who currently endorses patient code status is DNR, no chest compressions, no ventilator placement. Additionally, patient's  completed LAPOST with Dr Cerrato on 07/18/2023 with similar specifications regarding her code status. [BA]   2315 Spoke with the hospitalist, Dr Lopez. Patient will be admitted to the ICU. [BA]      ED Course User Index  [BA] Steve Collins          Pt is presenting weak and confused with shock likely secondary to sepsis in setting of UTI. Peripheral extremities are warm with normal cap refill making cardiogenic shock less likely. MAP has improved following administration of Levophed and IVF's. Broad spectrum ABX given as well. DNR status to be maintained as per the wishes of her . Consultation has been placed to Dr. Lopez for further evaluation and management.    Kashmir Spear MD           Clinical Impression:   Final diagnoses:  [R57.9] Shock (Primary)  [N17.9] LOLY (acute kidney injury)  [N30.00] Acute cystitis without hematuria  [I50.9] Acute on chronic congestive heart failure, unspecified heart failure type  [A41.9] Sepsis, due to unspecified organism, unspecified whether acute organ dysfunction present        ED Disposition Condition    Admit Stable                I, Kashmir Spear, personally performed the services described in this documentation. All medical record entries made by the scribe were at my direction and in my presence.  I have reviewed the chart and agree that the record reflects my personal performance and is accurate and complete.    Kashmir Spear MD  08/02/23 0154

## 2023-08-02 NOTE — ASSESSMENT & PLAN NOTE
Patient with Hypoxic Respiratory failure which is Acute on chronic.  she is not on home oxygen. Supplemental oxygen was provided and noted- Oxygen Concentration (%):  [100] 100.  In the setting of shock, wean O2 as tolerated   Diurese when able.

## 2023-08-02 NOTE — PROGRESS NOTES
Pharmacist Renal Dose Adjustment Note    Nelsy Marino is a 82 y.o. female being treated with the medication cefepime    Patient Data:    Vital Signs (Most Recent):  Temp: 96.1 °F (35.6 °C) (08/01/23 2242)  Pulse: (!) 57 (08/02/23 0002)  Resp: (!) 27 (08/02/23 0002)  BP: (!) 93/50 (08/02/23 0002)  SpO2: 99 % (08/02/23 0002) Vital Signs (72h Range):  Temp:  [96.1 °F (35.6 °C)-97.7 °F (36.5 °C)]   Pulse:  [53-60]   Resp:  [8-34]   BP: (74-99)/(37-57)   SpO2:  [89 %-100 %]      Recent Labs   Lab 07/27/23  0811 08/01/23  0914 08/01/23 2129   CREATININE 1.3 3.5* 3.8*     Serum creatinine: 3.8 mg/dL (H) 08/01/23 2129  Estimated creatinine clearance: 9 mL/min (A)    Medication:cefepime dose: 2 g frequency q8h will be changed to medication:cefepime dose:2 g frequency:q24h    Pharmacist's Name: Rigo Hernandez  Pharmacist's Extension: [phone]

## 2023-08-02 NOTE — CARE UPDATE
Patient has been seen and examinated,patient with a past medical history of HFpEF (EF: 75%), severe PAD , hypertension, type 2 diabetes, history of DVT,was on hospice at home duo to debility,has DNR status, who presents with altered mental status,patient is in septic shock duo to UTI,on IV Abx,levophed,cultures are pending,on gentle IVF for ARF,has respiratory failure,likely aspiration,NPO status,on aspiration precaution,consulted ST,will be closely be monied in ICU.

## 2023-08-02 NOTE — H&P
[facility] Emergency Dept  [facility] Medicine  History & Physical    Patient Name: Nelsy Marino  MRN: [id number]  Patient Class: IP- Inpatient  Admission Date: 8/1/2023  Attending Physician: Jerilyn Chairez MD   Primary Care Provider: Juan F Garay         Patient information was obtained from relative(s) and ER records.     Subjective:     Principal Problem:Septic shock    Chief Complaint:   Chief Complaint   Patient presents with    Abnormal Lab     Pt BIB EMS for abnormal lab. EMS reported Pt was sent home today on hospice. EMS reported family canceled hospice and stated Dr. Clifton stated Pt's labs were low and to return to ED.        HPI: This is an 82-year-old female with a past medical history of HFpEF (EF: 75%), severe PAD , hypertension, type 2 diabetes, history of DVT who presents with altered mental status.    Patient initially presented to the ED on 8/1 for altered mental status. Upon EMS arrival, patient was noted to be hypotensive and had pinpoint pupils and was given narcan and fluids. Reportedly, she has been more confused and less active. Associated symptoms include decreased po intake. Patient was noted to be hypotensive and was given fluids, and ceftriaxone. Her BP did not respond to fluids, however, family did not want pressors and wished to proceed with hospice care. Social work was consulted and patient was discharged to home with hospice. However, patient's  declined equipment delivery and wanted home health only, so patient was not admitted to hospice. Patient was brought back to the ER later the same day for further management/evaluation as their wishes have changed to include treatment/pressors.     In the ED, the patient was bradycardic, tachypneic, hypoxic (requiring 6L O2) and hypotension (requiring Levophed). Labs were remarkable for an elevated creatinine (3.8 - baseline of 1.3), elevated BNP (2091), elevated troponin (0.124 < 0.113), elevated procalcitonin (1.11).  ABG showed a PH of 7.315, PCO2 of 48.8. UA showed +3 leukocytes, >100 WBCs, 41 RBCs and occasional bacteria. CXR showed pulmonary vascular congestion, pulmonary edema and small bilateral pleural effusion. She was given 1L of NS, vancomycin, cefepime and was started on levophed. A central line was placed. Patient was admitted for further management.         Past Medical History:   Diagnosis Date    Amputation of toes     CHF (congestive heart failure)     Diabetes mellitus     DVT (deep venous thrombosis)     Hypertension     Miscarriage     On home oxygen therapy     PRN       Past Surgical History:   Procedure Laterality Date     femoral vascular surgery       ANGIOGRAPHY OF LOWER EXTREMITY Right 2/23/2023    Procedure: Angiogram Extremity Unilateral;  Surgeon: Danny Dunbar MD;  Location: [facility] OR;  Service: Vascular;  Laterality: Right;  RN PHONE PREOP 2/20/2023----CK CONSENT    ANGIOGRAPHY OF LOWER EXTREMITY Right 4/4/2023    Procedure: Angiogram Extremity Unilateral - possible endovascular intervention;  Surgeon: René Simms MD;  Location: [facility] OR [address] [place] [address];  Service: Vascular;  Laterality: Right;  19.6 mins.  740.81 mGy  73.3399 Gy.cm  58ml dye  local - 1ml    COLONOSCOPY N/A 5/18/2023    Procedure: COLONOSCOPY;  Surgeon: Cuong Aguiar MD;  Location: [facility] ENDO;  Service: Endoscopy;  Laterality: N/A;    CREATION OF FEMORAL-FEMORAL ARTERY BYPASS WITH GRAFT Bilateral 3/2/2023    Procedure: CREATION, BYPASS, ARTERIAL, FEMORAL TO FEMORAL, USING GRAFT, LEFT FEMORAL ENDARTERECTOMY, LEFT ILIAC ARTERY STENT PLACEMENT, RIGHT LOWER EXTREMITY ANGIOGRAM AND RIGHT SFA  ANGIOPLASTY;  Surgeon: Danny Dunbar MD;  Location: [facility] OR [address] [place] [address];  Service: Vascular;  Laterality: Bilateral;   744.47 MGY  175.36 Gycm  Flouro time 20.5 mins      CREATION OF ILIOFEMORAL ARTERY BYPASS Right 3/20/2019    Procedure: CREATION, BYPASS, ARTERIAL, ILIAC TO FEMORAL, RIGHT LOWER EXTREMITY, RIGHT PROFUNDAPLASTY;   Surgeon: Danny Dunbar MD;  Location: [facility] OR;  Service: Vascular;  Laterality: Right;  11:00AM START PER NAZANIN RICHARDS PREOP 3/13/2019  ACT'S, CELL SAVER, GRAFTS, BOOK WATER---NEED H/P  CONSENT IN AM------HGBA1C    CREATION OF MUSCLE ROTATIONAL FLAP Left 3/31/2023    Procedure: CREATION, FLAP, MUSCLE ROTATION;  Surgeon: Brandon Smiley MD;  Location: [facility] OR 2ND FLR;  Service: Plastics;  Laterality: Left;    ENDOSCOPY OF PROXIMAL SMALL INTESTINE N/A 5/18/2023    Procedure: ENTEROSCOPY, PROXIMAL;  Surgeon: Cuong Aguiar MD;  Location: [facility] ENDO;  Service: Endoscopy;  Laterality: N/A;    ESOPHAGOGASTRODUODENOSCOPY N/A 3/17/2023    Procedure: EGD (ESOPHAGOGASTRODUODENOSCOPY);  Surgeon: Gee Rodriguez MD;  Location: [facility] ENDO (2ND FLR);  Service: Endoscopy;  Laterality: N/A;    ESOPHAGOGASTRODUODENOSCOPY N/A 4/11/2023    Procedure: EGD (ESOPHAGOGASTRODUODENOSCOPY);  Surgeon: Nazanin Kaiser MD;  Location: [facility] ENDO (2ND FLR);  Service: Endoscopy;  Laterality: N/A;    ESOPHAGOGASTRODUODENOSCOPY N/A 4/26/2023    Procedure: EGD (ESOPHAGOGASTRODUODENOSCOPY);  Surgeon: Gee Rodriguez MD;  Location: [facility] ENDO (2ND FLR);  Service: Endoscopy;  Laterality: N/A;    ESOPHAGOGASTRODUODENOSCOPY N/A 5/2/2023    Procedure: EGD (ESOPHAGOGASTRODUODENOSCOPY);  Surgeon: Cuong Conteh MD;  Location: [facility] ENDO (2ND FLR);  Service: Endoscopy;  Laterality: N/A;    FOOT AMPUTATION THROUGH METATARSAL Right 3/4/2023    Procedure: AMPUTATION, FOOT, TRANSMETATARSAL;  Surgeon: Benjamin Godfrey DPM;  Location: [facility] OR 2ND FLR;  Service: Podiatry;  Laterality: Right;    HYSTERECTOMY      ?unsure cervix present    INCISION AND DRAINAGE Right 4/12/2019    Procedure: Incision and Drainage - R groin washout, possible muscle flap;  Surgeon: Danny Dunbar MD;  Location: [facility] OR 2ND FLR;  Service: Vascular;  Laterality: Right;  groin washout    INCISION AND DRAINAGE OF GROIN Right 5/3/2019    Procedure:  "Incision and Drainage R groin;  Surgeon: Danny Dunbar MD;  [facility];  Service: Vascular;  Laterality: Right;    WOUND DEBRIDEMENT Left 3/31/2023    Procedure: DEBRIDEMENT, WOUND;  Surgeon: René Simms MD;  [facility];  Service: Vascular;  Laterality: Left;       Review of patient's allergies indicates:   Allergen Reactions    Motrin [ibuprofen] Itching       Current Facility-Administered Medications on File Prior to Encounter   Medication    [COMPLETED] cefTRIAXone (ROCEPHIN) 2 g in dextrose 5 % in water (D5W) 100 mL IVPB (MB+)    [COMPLETED] sodium chloride 0.9% bolus 500 mL 500 mL    [COMPLETED] sodium chloride 0.9% bolus 500 mL 500 mL    [DISCONTINUED] naloxone 0.4 mg/mL injection 0.04 mg     Current Outpatient Medications on File Prior to Encounter   Medication Sig    acetaminophen (TYLENOL) 500 MG tablet Take 2 tablets (1,000 mg total) by mouth every 8 (eight) hours. (Patient not taking: Reported on 7/20/2023)    amLODIPine (NORVASC) 10 MG tablet Take 1 tablet (10 mg total) by mouth once daily.    atorvastatin (LIPITOR) 40 MG tablet Take 1 tablet (40 mg total) by mouth every evening.    carvediloL (COREG) 25 MG tablet Take 1 tablet (25 mg total) by mouth 2 (two) times daily.    cephALEXin (KEFLEX) 500 MG capsule Take 1 capsule (500 mg total) by mouth every 12 (twelve) hours.    cephALEXin (KEFLEX) 500 MG capsule Take 1 capsule (500 mg total) by mouth 4 (four) times daily. for 5 days    diphenhydrAMINE (BENADRYL) 25 mg capsule Take 25 mg by mouth daily as needed for Itching.    fluconazole (DIFLUCAN) 200 MG Tab Take 2 tablets (400 mg total) by mouth once daily.    furosemide (LASIX) 40 MG tablet Take 1 tablet (40 mg total) by mouth once daily.    insulin detemir U-100, Levemir, 100 unit/mL (3 mL) SubQ InPn pen Inject 5 Units into the skin every evening.    insulin syringe-needle,dispos. 0.3 mL 30 gauge x 5/16" Syrg by Misc.(Non-Drug; Combo Route) " "route.    ondansetron (ZOFRAN) 8 MG tablet Take by mouth every 8 (eight) hours as needed for Nausea.    oxyCODONE-acetaminophen (PERCOCET) 5-325 mg per tablet Take 1 tablet by mouth every 8 (eight) hours as needed for Pain.    pantoprazole (PROTONIX) 40 MG tablet Take 1 tablet (40 mg total) by mouth once daily.    pen needle, diabetic 32 gauge x 5/32" Ndle Inject 1 each into the skin 4 (four) times daily.    senna (SENOKOT) 8.6 mg tablet Take 1 tablet by mouth once daily.     Family History       Problem Relation (Age of Onset)    Diabetes Father    Hypertension Father          Tobacco Use    Smoking status: Former     Current packs/day: 0.00     Types: Cigarettes     Quit date:      Years since quittin.6    Smokeless tobacco: Never   Substance and Sexual Activity    Alcohol use: No    Drug use: No    Sexual activity: Not Currently     Partners: Male     Review of Systems   Unable to perform ROS: Mental status change     Objective:     Vital Signs (Most Recent):  Temp: 96.1 °F (35.6 °C) (23)  Pulse: (!) 57 (23)  Resp: (!) 27 (23)  BP: (!) 93/50 (23)  SpO2: 99 % (23) Vital Signs (24h Range):  Temp:  [96.1 °F (35.6 °C)-97.7 °F (36.5 °C)] 96.1 °F (35.6 °C)  Pulse:  [53-60] 57  Resp:  [8-34] 27  SpO2:  [89 %-100 %] 99 %  BP: (74-99)/(37-57) 93/50     Weight: 58 kg (127 lb 13.9 oz)  Body mass index is 23.39 kg/m².     Physical Exam  Vitals and nursing note reviewed.   Constitutional:       General: She is in acute distress.      Appearance: She is ill-appearing.   HENT:      Head: Normocephalic and atraumatic.      Nose: Nose normal.      Mouth/Throat:      Mouth: Mucous membranes are moist.   Eyes:      Extraocular Movements: Extraocular movements intact.   Cardiovascular:      Rate and Rhythm: Normal rate.      Pulses: Normal pulses.      Heart sounds: No murmur heard.  Pulmonary:      Effort: Pulmonary effort is normal. No respiratory distress. "   Abdominal:      General: Abdomen is flat.      Palpations: Abdomen is soft.      Tenderness: There is no abdominal tenderness.   Musculoskeletal:      Right lower leg: No edema.      Left lower leg: No edema.   Skin:     General: Skin is warm.      Capillary Refill: Capillary refill takes less than 2 seconds.   Neurological:      Mental Status: She is alert.      Comments: Alert, answers simples questions, follows commands                 Significant Labs: All pertinent labs within the past 24 hours have been reviewed.    Significant Imaging: I have reviewed all pertinent imaging results/findings within the past 24 hours.    Assessment/Plan:     * Septic shock  This patient does have evidence of infective focus  My overall impression is sepsis.  Source: Urinary Tract and Skin and Soft Tissue (location foot)  Antibiotics given-   Antibiotics (72h ago, onward)    Start     Stop Route Frequency Ordered    08/02/23 2300  ceFEPIme (MAXIPIME) 2 g in dextrose 5 % in water (D5W) 100 mL IVPB (MB+)         -- IV Every 24 hours (non-standard times) 08/02/23 0023    08/02/23 0900  mupirocin 2 % ointment         08/07/23 0859 Nasl 2 times daily 08/02/23 0009    08/01/23 2330  vancomycin (VANCOCIN) 1,000 mg in dextrose 5 % (D5W) 250 mL IVPB (Vial-Mate)         08/02/23 1129 IV ED 1 Time 08/01/23 2321    08/01/23 2318  vancomycin - pharmacy to dose  (vancomycin IVPB (PEDS and ADULTS))        See Hyperspace for full Linked Orders Report.    -- IV pharmacy to manage frequency 08/01/23 2318        Latest lactate reviewed-  Recent Labs   Lab 08/01/23  0920 08/01/23  2129   LACTATE  --  2.2   POCLAC 1.33  --      Organ dysfunction indicated by Acute kidney injury, Acute respiratory failure and Encephalopathy    Fluid challenge Ideal Body Weight- The patient's ideal body weight is Ideal body weight: 50.1 kg (110 lb 7.2 oz) which will be used to calculate fluid bolus of 30 ml/kg for treatment of septic shock.      Post- resuscitation  assessment Yes Perfusion exam was performed within 6 hours of septic shock presentation after bolus shows Adequate tissue perfusion assessed by non-invasive monitoring       Will Start Pressors- Levophed for MAP of 65  Source control achieved by:   Antibiotics  F/U cultures        Acute metabolic encephalopathy  Likely in the setting of septic shock. No focal deficits on exam appreciated.    Monitor mental status    ACP (advance care planning)  Per discussion with ED provider & ; patient is DNR.     Acute on chronic respiratory failure with hypoxia  Patient with Hypoxic Respiratory failure which is Acute on chronic.  she is not on home oxygen. Supplemental oxygen was provided and noted- Oxygen Concentration (%):  [100] 100.  In the setting of shock, wean O2 as tolerated   Diurese when able.    Chronic diastolic heart failure  Results for orders placed during the hospital encounter of 07/10/23    Echo Saline Bubble? No    Interpretation Summary  · The left ventricle is normal in size with concentric hypertrophy and hyperdynamic systolic function.  · The estimated ejection fraction is 75%.  · Normal right ventricular size with normal right ventricular systolic function.  · Moderate left atrial enlargement.  · Moderate mitral regurgitation.  · Moderate to severe tricuspid regurgitation.  · Intermediate central venous pressure (8 mmHg).  · The estimated PA systolic pressure is 49 mmHg.  · There is pulmonary hypertension.  No acute issues      LOLY (acute kidney injury)  Patient with acute kidney injury/acute renal failure likely due to acute tubular necrosis caused by prolonged hypotension LOLY is currently worsening. Baseline creatinine 1.0 - Labs reviewed- Renal function/electrolytes with Estimated Creatinine Clearance: 9 mL/min (A) (based on SCr of 3.8 mg/dL (H)). according to latest data. Monitor urine output and serial BMP and adjust therapy as needed. Avoid nephrotoxins and renally dose meds for GFR listed  above.    Diabetic foot infection  Concern for foot infection. See plan for septic shock.     Type 2 diabetes mellitus, with long-term current use of insulin  Patient's FSGs are controlled on current medication regimen.  Last A1c reviewed-   Lab Results   Component Value Date    HGBA1C 6.5 (H) 06/20/2023     Most recent fingerstick glucose reviewed-   Recent Labs   Lab 08/01/23 2124   POCTGLUCOSE 99     Current correctional scale  Low  Maintain anti-hyperglycemic dose as follows-   Antihyperglycemics (From admission, onward)    Start     Stop Route Frequency Ordered    08/02/23 0145  insulin aspart U-100 pen 0-5 Units         -- SubQ Every 6 hours PRN 08/02/23 0045        Hold Oral hypoglycemics while patient is in the hospital.    Essential hypertension  Hold home medications in the setting of hypotension.       PAD (peripheral artery disease)  Did not tolerate aspirin/plavix due to bleeding.   Continue statin        VTE Risk Mitigation (From admission, onward)         Ordered     heparin (porcine) injection 5,000 Units  Every 8 hours         08/02/23 0018     IP VTE HIGH RISK PATIENT  Once         08/02/23 0018     Place sequential compression device  Until discontinued         08/02/23 0018                           Ramana Lopez MD  Department of Hospital Medicine  [facility] [place]

## 2023-08-02 NOTE — HPI
This is an 82-year-old female with a past medical history of HFpEF (EF: 75%), severe PAD , hypertension, type 2 diabetes, history of DVT who presents with altered mental status.    Patient initially presented to the ED on 8/1 for altered mental status. Upon EMS arrival, patient was noted to be hypotensive and had pinpoint pupils and was given narcan and fluids. Reportedly, she has been more confused and less active. Associated symptoms include decreased po intake. Patient was noted to be hypotensive and was given fluids, and ceftriaxone. Her BP did not respond to fluids, however, family did not want pressors and wished to proceed with hospice care. Social work was consulted and patient was discharged to home with hospice. However, patient's  declined equipment delivery and wanted home health only, so patient was not admitted to hospice. Patient was brought back to the ER later the same day for further management/evaluation as their wishes have changed to include treatment/pressors.     In the ED, the patient was bradycardic, tachypneic, hypoxic (requiring 6L O2) and hypotension (requiring Levophed). Labs were remarkable for an elevated creatinine (3.8 - baseline of 1.3), elevated BNP (2091), elevated troponin (0.124 < 0.113), elevated procalcitonin (1.11). ABG showed a PH of 7.315, PCO2 of 48.8. UA showed +3 leukocytes, >100 WBCs, 41 RBCs and occasional bacteria. CXR showed pulmonary vascular congestion, pulmonary edema and small bilateral pleural effusion. She was given 1L of NS, vancomycin, cefepime and was started on levophed. A central line was placed. Patient was admitted for further management.

## 2023-08-02 NOTE — PLAN OF CARE
Recommendations     1. When medically acceptable, advance diet as tolerate to clear liquids.   2. Consider Dylon to promote wound healing and high protein diet.   3. If diet does not advance, reconsult for alternate means of nutrition. 4. Collaboration with medical providers     Goals: 1. Pt to meet % EEN/EPN by RD follow up  Nutrition Goal Status: new  Communication of RD Recs:  (POC)     Assessment and Plan     Nutrition Problem  Increased nutrient needs ; protein     Related to (etiology):   Wound healing     Signs and Symptoms (as evidenced by):   Multiple wounds on lower extremity     Interventions/Recommendations (treatment strategy):  High Protein Diet     Nutrition Diagnosis Status:   New

## 2023-08-02 NOTE — PLAN OF CARE
Pt free from fall/injury, remains on bedrest, turned Q2. Pt lethargic but wakes up and answers appropriately with mild confusion. Multiple wounds noted on admit, charted, wound care consulted. No s/s of infection noted, afebrile, Levo titration down until approximately 0600 when increase required. [place] in place

## 2023-08-02 NOTE — CONSULTS
"  [facility] - Intensive Care  Adult Nutrition  Consult Note    SUMMARY     Recommendations    1. When medically acceptable, advance diet as tolerate to clear liquids.   2. Consider Dylon to promote wound healing and high protein diet.   3. If diet does not advance, reconsult for alternate means of nutrition. 4. Collaboration with medical providers    Goals: 1. Pt to meet % EEN/EPN by RD follow up  Nutrition Goal Status: new  Communication of RD Recs:  (POC)    Assessment and Plan    Nutrition Problem  Increased nutrient needs ; protein    Related to (etiology):   Wound healing    Signs and Symptoms (as evidenced by):   Multiple wounds on lower extremity    Interventions/Recommendations (treatment strategy):  High Protein Diet    Nutrition Diagnosis Status:   New     Reason for Assessment    Reason For Assessment: identified at risk by screening criteria  Diagnosis: infection/sepsis  Relevant Medical History:   Past Medical History:   Diagnosis Date    Amputation of toes     CHF (congestive heart failure)     Diabetes mellitus     DVT (deep venous thrombosis)     Hypertension     Miscarriage     On home oxygen therapy     PRN      Interdisciplinary Rounds: did not attend  General Information Comments: Consulted previously for large nonhealing wounds. Consulted today for lack of appetite. Pt with 23 lb weight loss x 1 month. (18.2% weight loss x 1 month). Pt BMI 22.66, normal BMI. No intake recorded in chart. NFPE innapropriate at time of visit. Pt was sleeping.  Nutrition Discharge Planning: NPO    Nutrition Risk Screen    Nutrition Risk Screen: no indicators present    Nutrition/Diet History    Food Allergies: NKFA    Anthropometrics    Temp: 98 °F (36.7 °C)  Height Method: Stated  Height: 5' 2" (157.5 cm)  Height (inches): 62 in  Weight Method: Bed Scale  Weight: 55.8 kg (123 lb)  Weight (lb): 123 lb  Ideal Body Weight (IBW), Female: 110 lb  % Ideal Body Weight, Female (lb): 111.82 %  BMI (Calculated): " "22.5  BMI Grade: 18.5-24.9 - normal       Lab/Procedures/Meds    Pertinent Labs Reviewed: reviewed  BMP  Lab Results   Component Value Date     08/02/2023    K 3.4 (L) 08/02/2023     08/02/2023    CO2 23 08/02/2023    BUN 54 (H) 08/02/2023    CREATININE 3.6 (H) 08/02/2023    CALCIUM 7.4 (L) 08/02/2023    ANIONGAP 11 08/02/2023    EGFRNORACEVR 12 (A) 08/02/2023      Pertinent Medications Reviewed: reviewed  Current Outpatient Medications   Medication Instructions    acetaminophen (TYLENOL) 1,000 mg, Oral, Every 8 hours    amLODIPine (NORVASC) 10 mg, Oral, Daily    atorvastatin (LIPITOR) 40 mg, Oral, Nightly    carvediloL (COREG) 25 mg, Oral, 2 times daily    cephALEXin (KEFLEX) 500 mg, Oral, Every 12 hours    cephALEXin (KEFLEX) 500 mg, Oral, 4 times daily    diphenhydrAMINE (BENADRYL) 25 mg, Oral, Daily PRN    fluconazole (DIFLUCAN) 400 mg, Oral, Daily    furosemide (LASIX) 40 mg, Oral, Daily    insulin syringe-needle,dispos. 0.3 mL 30 gauge x 5/16" Syrg Misc.(Non-Drug; Combo Route)    LEVEMIR FLEXPEN 5 Units, Subcutaneous, Nightly    ondansetron (ZOFRAN) 8 MG tablet Oral, Every 8 hours PRN    oxyCODONE-acetaminophen (PERCOCET) 5-325 mg per tablet 1 tablet, Oral, Every 8 hours PRN    pantoprazole (PROTONIX) 40 mg, Oral, Daily    pen needle, diabetic 32 gauge x 5/32" Ndle 1 each, Subcutaneous, 4 times daily    senna (SENOKOT) 8.6 mg tablet 1 tablet, Oral, Daily        Estimated/Assessed Needs    Weight Used For Calorie Calculations: 55.8 kg (123 lb)  Energy Calorie Requirements (kcal): 1262 kcal  Energy Need Method: [place]  Protein Requirements: 56 g  Weight Used For Protein Calculations: 55.8 kg (123 lb)     Estimated Fluid Requirement Method: RDA Method  RDA Method (mL): 1262         Nutrition Prescription Ordered    Current Diet Order: NPO    Evaluation of Received Nutrient/Fluid Intake    Energy Calories Required: not meeting needs  Protein Required: not meeting needs  Fluid Required: not " meeting needs  Comments: lbm 8/2/23  % Intake of Estimated Energy Needs: 0 - 25 %  % Meal Intake: NPO    Nutrition Risk    Level of Risk/Frequency of Follow-up: moderate       Monitor and Evaluation    Food and Nutrient Intake: food and beverage intake  Food and Nutrient Adminstration: diet order  Knowledge/Beliefs/Attitudes: food and nutrition knowledge/skill  Physical Activity and Function: nutrition-related ADLs and IADLs, factors affecting access to physical activity  Anthropometric Measurements: weight, weight change, body mass index  Biochemical Data, Medical Tests and Procedures: inflammatory profile  Nutrition-Focused Physical Findings: overall appearance       Nutrition Follow-Up    RD Follow-up?: Yes  Liza Carlisle, Registration Eligible, Provisional LDN

## 2023-08-02 NOTE — ASSESSMENT & PLAN NOTE
Patient with acute kidney injury/acute renal failure likely due to acute tubular necrosis caused by prolonged hypotension LOLY is currently worsening. Baseline creatinine 1.0 - Labs reviewed- Renal function/electrolytes with Estimated Creatinine Clearance: 9 mL/min (A) (based on SCr of 3.8 mg/dL (H)). according to latest data. Monitor urine output and serial BMP and adjust therapy as needed. Avoid nephrotoxins and renally dose meds for GFR listed above.

## 2023-08-02 NOTE — ASSESSMENT & PLAN NOTE
Likely in the setting of septic shock. No focal deficits on exam appreciated.    Monitor mental status

## 2023-08-02 NOTE — ASSESSMENT & PLAN NOTE
This patient does have evidence of infective focus  My overall impression is sepsis.  Source: Urinary Tract and Skin and Soft Tissue (location foot)  Antibiotics given-   Antibiotics (72h ago, onward)    Start     Stop Route Frequency Ordered    08/02/23 2300  ceFEPIme (MAXIPIME) 2 g in dextrose 5 % in water (D5W) 100 mL IVPB (MB+)         -- IV Every 24 hours (non-standard times) 08/02/23 0023    08/02/23 0900  mupirocin 2 % ointment         08/07/23 0859 Nasl 2 times daily 08/02/23 0009    08/01/23 2330  vancomycin (VANCOCIN) 1,000 mg in dextrose 5 % (D5W) 250 mL IVPB (Vial-Mate)         08/02/23 1129 IV ED 1 Time 08/01/23 2321 08/01/23 2318  vancomycin - pharmacy to dose  (vancomycin IVPB (PEDS and ADULTS))        See Hyperspace for full Linked Orders Report.    -- IV pharmacy to manage frequency 08/01/23 2318        Latest lactate reviewed-  Recent Labs   Lab 08/01/23  0920 08/01/23 2129   LACTATE  --  2.2   POCLAC 1.33  --      Organ dysfunction indicated by Acute kidney injury, Acute respiratory failure and Encephalopathy    Fluid challenge Ideal Body Weight- The patient's ideal body weight is Ideal body weight: 50.1 kg (110 lb 7.2 oz) which will be used to calculate fluid bolus of 30 ml/kg for treatment of septic shock.      Post- resuscitation assessment Yes Perfusion exam was performed within 6 hours of septic shock presentation after bolus shows Adequate tissue perfusion assessed by non-invasive monitoring       Will Start Pressors- Levophed for MAP of 65  Source control achieved by:   Antibiotics  F/U cultures

## 2023-08-02 NOTE — SUBJECTIVE & OBJECTIVE
Past Medical History:   Diagnosis Date    Amputation of toes     CHF (congestive heart failure)     Diabetes mellitus     DVT (deep venous thrombosis)     Hypertension     Miscarriage     On home oxygen therapy     PRN       Past Surgical History:   Procedure Laterality Date     femoral vascular surgery       ANGIOGRAPHY OF LOWER EXTREMITY Right 2/23/2023    Procedure: Angiogram Extremity Unilateral;  Surgeon: Danny Dunbar MD;  Location: [facility] OR;  Service: Vascular;  Laterality: Right;  RN PHONE PREOP 2/20/2023----CK CONSENT    ANGIOGRAPHY OF LOWER EXTREMITY Right 4/4/2023    Procedure: Angiogram Extremity Unilateral - possible endovascular intervention;  Surgeon: René Simms MD;  [facility];  Service: Vascular;  Laterality: Right;  19.6 mins.  740.81 mGy  73.3399 Gy.cm  58ml dye  local - 1ml    COLONOSCOPY N/A 5/18/2023    Procedure: COLONOSCOPY;  Surgeon: Cuong Aguiar MD;  Location: [facility] ENDO;  Service: Endoscopy;  Laterality: N/A;    CREATION OF FEMORAL-FEMORAL ARTERY BYPASS WITH GRAFT Bilateral 3/2/2023    Procedure: CREATION, BYPASS, ARTERIAL, FEMORAL TO FEMORAL, USING GRAFT, LEFT FEMORAL ENDARTERECTOMY, LEFT ILIAC ARTERY STENT PLACEMENT, RIGHT LOWER EXTREMITY ANGIOGRAM AND RIGHT SFA  ANGIOPLASTY;  Surgeon: Danny Dunbar MD;  Location: [address];  Service: Vascular;  Laterality: Bilateral;   744.47 MGY  175.36 Gycm  Flouro time 20.5 mins      CREATION OF ILIOFEMORAL ARTERY BYPASS Right 3/20/2019    Procedure: CREATION, BYPASS, ARTERIAL, ILIAC TO FEMORAL, RIGHT LOWER EXTREMITY, RIGHT PROFUNDAPLASTY;  Surgeon: Danny Dunbar MD;  Location: [facility] OR;  Service: Vascular;  Laterality: Right;  11:00AM START PER ANNE RICHARDS PREOP 3/13/2019  ACT'S, CELL SAVER, GRAFTS, BOOK WATER---NEED H/P  CONSENT IN AM------HGBA1C    CREATION OF MUSCLE ROTATIONAL FLAP Left 3/31/2023    Procedure: CREATION, FLAP, MUSCLE ROTATION;  Surgeon: Brandon Smiley MD;  Location: [facility]  OR 2ND FLR;  Service: Plastics;  Laterality: Left;    ENDOSCOPY OF PROXIMAL SMALL INTESTINE N/A 5/18/2023    Procedure: ENTEROSCOPY, PROXIMAL;  Surgeon: Cuong Aguiar MD;  Location: [facility] ENDO;  Service: Endoscopy;  Laterality: N/A;    ESOPHAGOGASTRODUODENOSCOPY N/A 3/17/2023    Procedure: EGD (ESOPHAGOGASTRODUODENOSCOPY);  Surgeon: Gee Rodriguez MD;  Location: [facility] ENDO (2ND FLR);  Service: Endoscopy;  Laterality: N/A;    ESOPHAGOGASTRODUODENOSCOPY N/A 4/11/2023    Procedure: EGD (ESOPHAGOGASTRODUODENOSCOPY);  Surgeon: Nazanin Kaiser MD;  Location: [facility] ENDO (2ND FLR);  Service: Endoscopy;  Laterality: N/A;    ESOPHAGOGASTRODUODENOSCOPY N/A 4/26/2023    Procedure: EGD (ESOPHAGOGASTRODUODENOSCOPY);  Surgeon: Gee Rodriguez MD;  Location: [facility] ENDO (2ND FLR);  Service: Endoscopy;  Laterality: N/A;    ESOPHAGOGASTRODUODENOSCOPY N/A 5/2/2023    Procedure: EGD (ESOPHAGOGASTRODUODENOSCOPY);  Surgeon: Cuong Conteh MD;  Location: [facility] ENDO (2ND FLR);  Service: Endoscopy;  Laterality: N/A;    FOOT AMPUTATION THROUGH METATARSAL Right 3/4/2023    Procedure: AMPUTATION, FOOT, TRANSMETATARSAL;  Surgeon: Benjamin Godfrey DPM;  [facility];  Service: Podiatry;  Laterality: Right;    HYSTERECTOMY      ?unsure cervix present    INCISION AND DRAINAGE Right 4/12/2019    Procedure: Incision and Drainage - R groin washout, possible muscle flap;  Surgeon: Danny Dunbar MD;  Location: [facility] OR 2ND FLR;  Service: Vascular;  Laterality: Right;  groin washout    INCISION AND DRAINAGE OF GROIN Right 5/3/2019    Procedure: Incision and Drainage R groin;  Surgeon: Danny Dunbar MD;  Location: [facility] OR 2ND FLR;  Service: Vascular;  Laterality: Right;    WOUND DEBRIDEMENT Left 3/31/2023    Procedure: DEBRIDEMENT, WOUND;  Surgeon: René Simms MD;  Location: [facility] OR 2ND FLR;  Service: Vascular;  Laterality: Left;       Review of patient's allergies indicates:   Allergen Reactions    Motrin  "[ibuprofen] Itching       Current Facility-Administered Medications on File Prior to Encounter   Medication    [COMPLETED] cefTRIAXone (ROCEPHIN) 2 g in dextrose 5 % in water (D5W) 100 mL IVPB (MB+)    [COMPLETED] sodium chloride 0.9% bolus 500 mL 500 mL    [COMPLETED] sodium chloride 0.9% bolus 500 mL 500 mL    [DISCONTINUED] naloxone 0.4 mg/mL injection 0.04 mg     Current Outpatient Medications on File Prior to Encounter   Medication Sig    acetaminophen (TYLENOL) 500 MG tablet Take 2 tablets (1,000 mg total) by mouth every 8 (eight) hours. (Patient not taking: Reported on 7/20/2023)    amLODIPine (NORVASC) 10 MG tablet Take 1 tablet (10 mg total) by mouth once daily.    atorvastatin (LIPITOR) 40 MG tablet Take 1 tablet (40 mg total) by mouth every evening.    carvediloL (COREG) 25 MG tablet Take 1 tablet (25 mg total) by mouth 2 (two) times daily.    cephALEXin (KEFLEX) 500 MG capsule Take 1 capsule (500 mg total) by mouth every 12 (twelve) hours.    cephALEXin (KEFLEX) 500 MG capsule Take 1 capsule (500 mg total) by mouth 4 (four) times daily. for 5 days    diphenhydrAMINE (BENADRYL) 25 mg capsule Take 25 mg by mouth daily as needed for Itching.    fluconazole (DIFLUCAN) 200 MG Tab Take 2 tablets (400 mg total) by mouth once daily.    furosemide (LASIX) 40 MG tablet Take 1 tablet (40 mg total) by mouth once daily.    insulin detemir U-100, Levemir, 100 unit/mL (3 mL) SubQ InPn pen Inject 5 Units into the skin every evening.    insulin syringe-needle,dispos. 0.3 mL 30 gauge x 5/16" Syrg by Misc.(Non-Drug; Combo Route) route.    ondansetron (ZOFRAN) 8 MG tablet Take by mouth every 8 (eight) hours as needed for Nausea.    oxyCODONE-acetaminophen (PERCOCET) 5-325 mg per tablet Take 1 tablet by mouth every 8 (eight) hours as needed for Pain.    pantoprazole (PROTONIX) 40 MG tablet Take 1 tablet (40 mg total) by mouth once daily.    pen needle, diabetic 32 gauge x 5/32" Ndle Inject 1 each into the skin 4 (four) times " daily.    senna (SENOKOT) 8.6 mg tablet Take 1 tablet by mouth once daily.     Family History       Problem Relation (Age of Onset)    Diabetes Father    Hypertension Father          Tobacco Use    Smoking status: Former     Current packs/day: 0.00     Types: Cigarettes     Quit date:      Years since quittin.6    Smokeless tobacco: Never   Substance and Sexual Activity    Alcohol use: No    Drug use: No    Sexual activity: Not Currently     Partners: Male     Review of Systems   Unable to perform ROS: Mental status change     Objective:     Vital Signs (Most Recent):  Temp: 96.1 °F (35.6 °C) (232)  Pulse: (!) 57 (23 0002)  Resp: (!) 27 (23)  BP: (!) 93/50 (23)  SpO2: 99 % (23) Vital Signs (24h Range):  Temp:  [96.1 °F (35.6 °C)-97.7 °F (36.5 °C)] 96.1 °F (35.6 °C)  Pulse:  [53-60] 57  Resp:  [8-34] 27  SpO2:  [89 %-100 %] 99 %  BP: (74-99)/(37-57) 93/50     Weight: 58 kg (127 lb 13.9 oz)  Body mass index is 23.39 kg/m².     Physical Exam  Vitals and nursing note reviewed.   Constitutional:       General: She is in acute distress.      Appearance: She is ill-appearing.   HENT:      Head: Normocephalic and atraumatic.      Nose: Nose normal.      Mouth/Throat:      Mouth: Mucous membranes are moist.   Eyes:      Extraocular Movements: Extraocular movements intact.   Cardiovascular:      Rate and Rhythm: Normal rate.      Pulses: Normal pulses.      Heart sounds: No murmur heard.  Pulmonary:      Effort: Pulmonary effort is normal. No respiratory distress.   Abdominal:      General: Abdomen is flat.      Palpations: Abdomen is soft.      Tenderness: There is no abdominal tenderness.   Musculoskeletal:      Right lower leg: No edema.      Left lower leg: No edema.   Skin:     General: Skin is warm.      Capillary Refill: Capillary refill takes less than 2 seconds.   Neurological:      Mental Status: She is alert.      Comments: Alert, answers simples questions,  follows commands                 Significant Labs: All pertinent labs within the past 24 hours have been reviewed.    Significant Imaging: I have reviewed all pertinent imaging results/findings within the past 24 hours.

## 2023-08-02 NOTE — ED NOTES
Report called to ICU and patient to be transported to unit following TLC placement. Pending MD Spear to bedside for procedure. All supplies at bedside.

## 2023-08-02 NOTE — ASSESSMENT & PLAN NOTE
Patient's FSGs are controlled on current medication regimen.  Last A1c reviewed-   Lab Results   Component Value Date    HGBA1C 6.5 (H) 06/20/2023     Most recent fingerstick glucose reviewed-   Recent Labs   Lab 08/01/23 2124   POCTGLUCOSE 99     Current correctional scale  Low  Maintain anti-hyperglycemic dose as follows-   Antihyperglycemics (From admission, onward)    Start     Stop Route Frequency Ordered    08/02/23 0145  insulin aspart U-100 pen 0-5 Units         -- SubQ Every 6 hours PRN 08/02/23 0045        Hold Oral hypoglycemics while patient is in the hospital.

## 2023-08-02 NOTE — EICU
eICU Physician Brief Note    Chart reviewed. On camera, the patient is resting in bed, in NAD.  Mrs Nelsy Marino is an 82 year old lady presenting with confusion and decreased level of activity. In the ED, she was hypotensive and rec'd IV fluids. Initially, the patient's family decided for home hospice, however eventually  did not accept home equipment and later pateint was brought back to the ED with a change in goals of care to DNR/ok treatment with pressors. She had a right IJ central line placed.  PMH: HFpEF, home O2, HTN, DM2, severe PAD, h/o DVT.  Labs and investigations reviewed.  Current medications reviewed.  A/P:  1. Shock  Likely septic, though CHF also in exacerbation.  Titrate pressors to MAP 65.Would avoid further fluids for now.  2. CHF  Monitor respiratory status closely.  Would avoid further fluids for now given probable pulmonary edema on CxR.  3. UTI  Started on Cefepime/Vanco.  Consider de-escalating given that the patient comes from home.  4. LOLY  Liekly ATN related to shock.  Rec'd fluid resuscitation. Follow Cre and electrolytes.  5. DM2  Control BG to <180 for optimal healing.  Sliding scale insulin.  6. GI/DVT prophylaxis diet as able; SCDs, SC heparin and mobility protocol.  Recommend palliative care consult.    eICU is available should acute issues arise.

## 2023-08-02 NOTE — ASSESSMENT & PLAN NOTE
Results for orders placed during the hospital encounter of 07/10/23    Echo Saline Bubble? No    Interpretation Summary  · The left ventricle is normal in size with concentric hypertrophy and hyperdynamic systolic function.  · The estimated ejection fraction is 75%.  · Normal right ventricular size with normal right ventricular systolic function.  · Moderate left atrial enlargement.  · Moderate mitral regurgitation.  · Moderate to severe tricuspid regurgitation.  · Intermediate central venous pressure (8 mmHg).  · The estimated PA systolic pressure is 49 mmHg.  · There is pulmonary hypertension.  No acute issues

## 2023-08-03 VITALS
OXYGEN SATURATION: 99 % | HEART RATE: 59 BPM | BODY MASS INDEX: 22.63 KG/M2 | SYSTOLIC BLOOD PRESSURE: 98 MMHG | DIASTOLIC BLOOD PRESSURE: 47 MMHG | RESPIRATION RATE: 14 BRPM | HEIGHT: 62 IN | WEIGHT: 123 LBS | TEMPERATURE: 98 F

## 2023-08-03 PROBLEM — J18.9 PNEUMONIA DUE TO INFECTIOUS ORGANISM: Status: ACTIVE | Noted: 2023-08-03

## 2023-08-03 PROBLEM — Z51.5 END OF LIFE CARE: Status: ACTIVE | Noted: 2023-08-03

## 2023-08-03 PROBLEM — J96.01 ACUTE RESPIRATORY FAILURE WITH HYPOXIA: Status: ACTIVE | Noted: 2023-08-03

## 2023-08-03 LAB
ALBUMIN SERPL BCP-MCNC: 1.6 G/DL (ref 3.5–5.2)
ALP SERPL-CCNC: 123 U/L (ref 55–135)
ALT SERPL W/O P-5'-P-CCNC: 13 U/L (ref 10–44)
ANION GAP SERPL CALC-SCNC: 14 MMOL/L (ref 8–16)
AST SERPL-CCNC: 48 U/L (ref 10–40)
BASOPHILS # BLD AUTO: 0.02 K/UL (ref 0–0.2)
BASOPHILS NFR BLD: 0.2 % (ref 0–1.9)
BILIRUB SERPL-MCNC: 0.3 MG/DL (ref 0.1–1)
BUN SERPL-MCNC: 62 MG/DL (ref 8–23)
CALCIUM SERPL-MCNC: 7.2 MG/DL (ref 8.7–10.5)
CHLORIDE SERPL-SCNC: 107 MMOL/L (ref 95–110)
CO2 SERPL-SCNC: 19 MMOL/L (ref 23–29)
CREAT SERPL-MCNC: 4.1 MG/DL (ref 0.5–1.4)
DIFFERENTIAL METHOD: ABNORMAL
EOSINOPHIL # BLD AUTO: 0.4 K/UL (ref 0–0.5)
EOSINOPHIL NFR BLD: 4.4 % (ref 0–8)
ERYTHROCYTE [DISTWIDTH] IN BLOOD BY AUTOMATED COUNT: 19.6 % (ref 11.5–14.5)
EST. GFR  (NO RACE VARIABLE): 10 ML/MIN/1.73 M^2
GLUCOSE SERPL-MCNC: 156 MG/DL (ref 70–110)
HCT VFR BLD AUTO: 23.9 % (ref 37–48.5)
HGB BLD-MCNC: 7.3 G/DL (ref 12–16)
IMM GRANULOCYTES # BLD AUTO: 0.03 K/UL (ref 0–0.04)
IMM GRANULOCYTES NFR BLD AUTO: 0.4 % (ref 0–0.5)
LYMPHOCYTES # BLD AUTO: 1.2 K/UL (ref 1–4.8)
LYMPHOCYTES NFR BLD: 14.5 % (ref 18–48)
MCH RBC QN AUTO: 25.1 PG (ref 27–31)
MCHC RBC AUTO-ENTMCNC: 30.5 G/DL (ref 32–36)
MCV RBC AUTO: 82 FL (ref 82–98)
MONOCYTES # BLD AUTO: 0.7 K/UL (ref 0.3–1)
MONOCYTES NFR BLD: 8.5 % (ref 4–15)
NEUTROPHILS # BLD AUTO: 6.1 K/UL (ref 1.8–7.7)
NEUTROPHILS NFR BLD: 72 % (ref 38–73)
NRBC BLD-RTO: 0 /100 WBC
PLATELET # BLD AUTO: 550 K/UL (ref 150–450)
PMV BLD AUTO: 9.6 FL (ref 9.2–12.9)
POTASSIUM SERPL-SCNC: 5.1 MMOL/L (ref 3.5–5.1)
PROT SERPL-MCNC: 5.7 G/DL (ref 6–8.4)
RBC # BLD AUTO: 2.91 M/UL (ref 4–5.4)
SODIUM SERPL-SCNC: 140 MMOL/L (ref 136–145)
T4 FREE SERPL-MCNC: 0.76 NG/DL (ref 0.71–1.51)
TSH SERPL DL<=0.005 MIU/L-ACNC: 5.61 UIU/ML (ref 0.4–4)
VANCOMYCIN SERPL-MCNC: 12.4 UG/ML
WBC # BLD AUTO: 8.48 K/UL (ref 3.9–12.7)

## 2023-08-03 PROCEDURE — 99497 PR ADVNCD CARE PLAN 30 MIN: ICD-10-PCS | Mod: 25,,, | Performed by: INTERNAL MEDICINE

## 2023-08-03 PROCEDURE — 85025 COMPLETE CBC W/AUTO DIFF WBC: CPT | Performed by: HOSPITALIST

## 2023-08-03 PROCEDURE — 63600175 PHARM REV CODE 636 W HCPCS: Performed by: HOSPITALIST

## 2023-08-03 PROCEDURE — 94640 AIRWAY INHALATION TREATMENT: CPT

## 2023-08-03 PROCEDURE — 99223 1ST HOSP IP/OBS HIGH 75: CPT | Mod: 25,,, | Performed by: INTERNAL MEDICINE

## 2023-08-03 PROCEDURE — 99497 ADVNCD CARE PLAN 30 MIN: CPT | Mod: 25,,, | Performed by: INTERNAL MEDICINE

## 2023-08-03 PROCEDURE — 80202 ASSAY OF VANCOMYCIN: CPT | Performed by: HOSPITALIST

## 2023-08-03 PROCEDURE — 80053 COMPREHEN METABOLIC PANEL: CPT | Performed by: HOSPITALIST

## 2023-08-03 PROCEDURE — 99223 PR INITIAL HOSPITAL CARE,LEVL III: ICD-10-PCS | Mod: 25,,, | Performed by: INTERNAL MEDICINE

## 2023-08-03 PROCEDURE — 25000003 PHARM REV CODE 250: Performed by: STUDENT IN AN ORGANIZED HEALTH CARE EDUCATION/TRAINING PROGRAM

## 2023-08-03 PROCEDURE — 27100171 HC OXYGEN HIGH FLOW UP TO 24 HOURS

## 2023-08-03 PROCEDURE — 25000242 PHARM REV CODE 250 ALT 637 W/ HCPCS: Performed by: HOSPITALIST

## 2023-08-03 PROCEDURE — 25000003 PHARM REV CODE 250: Performed by: HOSPITALIST

## 2023-08-03 PROCEDURE — 25000003 PHARM REV CODE 250: Performed by: INTERNAL MEDICINE

## 2023-08-03 PROCEDURE — 84439 ASSAY OF FREE THYROXINE: CPT | Performed by: HOSPITALIST

## 2023-08-03 PROCEDURE — 94799 UNLISTED PULMONARY SVC/PX: CPT

## 2023-08-03 PROCEDURE — 63600175 PHARM REV CODE 636 W HCPCS: Performed by: STUDENT IN AN ORGANIZED HEALTH CARE EDUCATION/TRAINING PROGRAM

## 2023-08-03 PROCEDURE — 94761 N-INVAS EAR/PLS OXIMETRY MLT: CPT

## 2023-08-03 PROCEDURE — 84443 ASSAY THYROID STIM HORMONE: CPT | Performed by: HOSPITALIST

## 2023-08-03 PROCEDURE — 63600175 PHARM REV CODE 636 W HCPCS: Performed by: INTERNAL MEDICINE

## 2023-08-03 PROCEDURE — 94668 MNPJ CHEST WALL SBSQ: CPT

## 2023-08-03 PROCEDURE — 63600175 PHARM REV CODE 636 W HCPCS: Performed by: NURSE PRACTITIONER

## 2023-08-03 RX ORDER — LORAZEPAM 2 MG/ML
0.5 INJECTION INTRAMUSCULAR
Status: DISCONTINUED | OUTPATIENT
Start: 2023-08-03 | End: 2023-08-03 | Stop reason: HOSPADM

## 2023-08-03 RX ORDER — HYDROMORPHONE HYDROCHLORIDE 1 MG/ML
0.2 INJECTION, SOLUTION INTRAMUSCULAR; INTRAVENOUS; SUBCUTANEOUS
Status: DISCONTINUED | OUTPATIENT
Start: 2023-08-03 | End: 2023-08-03 | Stop reason: HOSPADM

## 2023-08-03 RX ADMIN — SODIUM BICARBONATE: 84 INJECTION, SOLUTION INTRAVENOUS at 11:08

## 2023-08-03 RX ADMIN — LIDOCAINE PATCH 5% 1 PATCH: 700 PATCH TOPICAL at 01:08

## 2023-08-03 RX ADMIN — DEXTROSE MONOHYDRATE, SODIUM CHLORIDE, AND POTASSIUM CHLORIDE: 50; 9; 1.49 INJECTION, SOLUTION INTRAVENOUS at 08:08

## 2023-08-03 RX ADMIN — IPRATROPIUM BROMIDE AND ALBUTEROL SULFATE 3 ML: .5; 3 SOLUTION RESPIRATORY (INHALATION) at 08:08

## 2023-08-03 RX ADMIN — IPRATROPIUM BROMIDE AND ALBUTEROL SULFATE 3 ML: .5; 3 SOLUTION RESPIRATORY (INHALATION) at 02:08

## 2023-08-03 RX ADMIN — HEPARIN SODIUM 5000 UNITS: 5000 INJECTION INTRAVENOUS; SUBCUTANEOUS at 01:08

## 2023-08-03 RX ADMIN — HYDROMORPHONE HYDROCHLORIDE 0.2 MG: 1 INJECTION, SOLUTION INTRAMUSCULAR; INTRAVENOUS; SUBCUTANEOUS at 05:08

## 2023-08-03 RX ADMIN — FAMOTIDINE 20 MG: 10 INJECTION, SOLUTION INTRAVENOUS at 08:08

## 2023-08-03 RX ADMIN — LORAZEPAM 0.5 MG: 2 INJECTION INTRAMUSCULAR; INTRAVENOUS at 12:08

## 2023-08-03 RX ADMIN — MUPIROCIN: 20 OINTMENT TOPICAL at 08:08

## 2023-08-03 RX ADMIN — HEPARIN SODIUM 5000 UNITS: 5000 INJECTION INTRAVENOUS; SUBCUTANEOUS at 05:08

## 2023-08-03 RX ADMIN — HYDROMORPHONE HYDROCHLORIDE 0.2 MG: 1 INJECTION, SOLUTION INTRAMUSCULAR; INTRAVENOUS; SUBCUTANEOUS at 10:08

## 2023-08-03 RX ADMIN — VANCOMYCIN HYDROCHLORIDE 500 MG: 500 INJECTION, POWDER, LYOPHILIZED, FOR SOLUTION INTRAVENOUS at 09:08

## 2023-08-03 NOTE — SUBJECTIVE & OBJECTIVE
Past Medical History:   Diagnosis Date    Amputation of toes     CHF (congestive heart failure)     Diabetes mellitus     DVT (deep venous thrombosis)     Hypertension     Miscarriage     On home oxygen therapy     PRN       Past Surgical History:   Procedure Laterality Date     femoral vascular surgery       ANGIOGRAPHY OF LOWER EXTREMITY Right 2/23/2023    Procedure: Angiogram Extremity Unilateral;  Surgeon: Danny Dunbar MD;  Location: [facility] OR;  Service: Vascular;  Laterality: Right;  RN PHONE PREOP 2/20/2023----CK CONSENT    ANGIOGRAPHY OF LOWER EXTREMITY Right 4/4/2023    Procedure: Angiogram Extremity Unilateral - possible endovascular intervention;  Surgeon: René Simms MD;  [facility];  Service: Vascular;  Laterality: Right;  19.6 mins.  740.81 mGy  73.3399 Gy.cm  58ml dye  local - 1ml    COLONOSCOPY N/A 5/18/2023    Procedure: COLONOSCOPY;  Surgeon: Cuong Aguiar MD;  Location: [facility] ENDO;  Service: Endoscopy;  Laterality: N/A;    CREATION OF FEMORAL-FEMORAL ARTERY BYPASS WITH GRAFT Bilateral 3/2/2023    Procedure: CREATION, BYPASS, ARTERIAL, FEMORAL TO FEMORAL, USING GRAFT, LEFT FEMORAL ENDARTERECTOMY, LEFT ILIAC ARTERY STENT PLACEMENT, RIGHT LOWER EXTREMITY ANGIOGRAM AND RIGHT SFA  ANGIOPLASTY;  Surgeon: Danny Dunbar MD;  Location: [address];  Service: Vascular;  Laterality: Bilateral;   744.47 MGY  175.36 Gycm  Flouro time 20.5 mins      CREATION OF ILIOFEMORAL ARTERY BYPASS Right 3/20/2019    Procedure: CREATION, BYPASS, ARTERIAL, ILIAC TO FEMORAL, RIGHT LOWER EXTREMITY, RIGHT PROFUNDAPLASTY;  Surgeon: Danny Dunbar MD;  Location: [facility] OR;  Service: Vascular;  Laterality: Right;  11:00AM START PER ANNE RICHARDS PREOP 3/13/2019  ACT'S, CELL SAVER, GRAFTS, BOOK WATER---NEED H/P  CONSENT IN AM------HGBA1C    CREATION OF MUSCLE ROTATIONAL FLAP Left 3/31/2023    Procedure: CREATION, FLAP, MUSCLE ROTATION;  Surgeon: Brandon Smiley MD;  Location: [facility]  OR 2ND FLR;  Service: Plastics;  Laterality: Left;    ENDOSCOPY OF PROXIMAL SMALL INTESTINE N/A 5/18/2023    Procedure: ENTEROSCOPY, PROXIMAL;  Surgeon: Cuong Aguiar MD;  Location: [facility] ENDO;  Service: Endoscopy;  Laterality: N/A;    ESOPHAGOGASTRODUODENOSCOPY N/A 3/17/2023    Procedure: EGD (ESOPHAGOGASTRODUODENOSCOPY);  Surgeon: Gee Rodriguez MD;  Location: [facility] ENDO (2ND FLR);  Service: Endoscopy;  Laterality: N/A;    ESOPHAGOGASTRODUODENOSCOPY N/A 4/11/2023    Procedure: EGD (ESOPHAGOGASTRODUODENOSCOPY);  Surgeon: Nazanin Kaiser MD;  Location: [facility] ENDO (2ND FLR);  Service: Endoscopy;  Laterality: N/A;    ESOPHAGOGASTRODUODENOSCOPY N/A 4/26/2023    Procedure: EGD (ESOPHAGOGASTRODUODENOSCOPY);  Surgeon: Gee Rodriguez MD;  Location: [facility] ENDO (2ND FLR);  Service: Endoscopy;  Laterality: N/A;    ESOPHAGOGASTRODUODENOSCOPY N/A 5/2/2023    Procedure: EGD (ESOPHAGOGASTRODUODENOSCOPY);  Surgeon: Cuong Conteh MD;  Location: [facility] ENDO (2ND FLR);  Service: Endoscopy;  Laterality: N/A;    FOOT AMPUTATION THROUGH METATARSAL Right 3/4/2023    Procedure: AMPUTATION, FOOT, TRANSMETATARSAL;  Surgeon: Benjamin Godfrey DPM;  [facility];  Service: Podiatry;  Laterality: Right;    HYSTERECTOMY      ?unsure cervix present    INCISION AND DRAINAGE Right 4/12/2019    Procedure: Incision and Drainage - R groin washout, possible muscle flap;  Surgeon: Danny Dunbar MD;  Location: [facility] OR 2ND FLR;  Service: Vascular;  Laterality: Right;  groin washout    INCISION AND DRAINAGE OF GROIN Right 5/3/2019    Procedure: Incision and Drainage R groin;  Surgeon: Danny Dunbar MD;  Location: [facility] OR 2ND FLR;  Service: Vascular;  Laterality: Right;    WOUND DEBRIDEMENT Left 3/31/2023    Procedure: DEBRIDEMENT, WOUND;  Surgeon: René Simms MD;  Location: [facility] OR 2ND FLR;  Service: Vascular;  Laterality: Left;       Review of patient's allergies indicates:   Allergen Reactions    Motrin  "[ibuprofen] Itching       No current facility-administered medications on file prior to encounter.     Current Outpatient Medications on File Prior to Encounter   Medication Sig    acetaminophen (TYLENOL) 500 MG tablet Take 2 tablets (1,000 mg total) by mouth every 8 (eight) hours. (Patient not taking: Reported on 7/20/2023)    amLODIPine (NORVASC) 10 MG tablet Take 1 tablet (10 mg total) by mouth once daily.    atorvastatin (LIPITOR) 40 MG tablet Take 1 tablet (40 mg total) by mouth every evening.    carvediloL (COREG) 25 MG tablet Take 1 tablet (25 mg total) by mouth 2 (two) times daily.    cephALEXin (KEFLEX) 500 MG capsule Take 1 capsule (500 mg total) by mouth every 12 (twelve) hours.    cephALEXin (KEFLEX) 500 MG capsule Take 1 capsule (500 mg total) by mouth 4 (four) times daily. for 5 days    diphenhydrAMINE (BENADRYL) 25 mg capsule Take 25 mg by mouth daily as needed for Itching.    fluconazole (DIFLUCAN) 200 MG Tab Take 2 tablets (400 mg total) by mouth once daily.    furosemide (LASIX) 40 MG tablet Take 1 tablet (40 mg total) by mouth once daily.    insulin detemir U-100, Levemir, 100 unit/mL (3 mL) SubQ InPn pen Inject 5 Units into the skin every evening.    insulin syringe-needle,dispos. 0.3 mL 30 gauge x 5/16" Syrg by Misc.(Non-Drug; Combo Route) route.    ondansetron (ZOFRAN) 8 MG tablet Take by mouth every 8 (eight) hours as needed for Nausea.    oxyCODONE-acetaminophen (PERCOCET) 5-325 mg per tablet Take 1 tablet by mouth every 8 (eight) hours as needed for Pain.    pantoprazole (PROTONIX) 40 MG tablet Take 1 tablet (40 mg total) by mouth once daily.    pen needle, diabetic 32 gauge x 5/32" Ndle Inject 1 each into the skin 4 (four) times daily.    senna (SENOKOT) 8.6 mg tablet Take 1 tablet by mouth once daily.     Family History       Problem Relation (Age of Onset)    Diabetes Father    Hypertension Father          Tobacco Use    Smoking status: Former     Current packs/day: 0.00     Types: " Cigarettes     Quit date: 1980     Years since quittin.6    Smokeless tobacco: Never   Substance and Sexual Activity    Alcohol use: No    Drug use: No    Sexual activity: Not Currently     Partners: Male     Review of Systems   Unable to perform ROS: Mental status change     Objective:     Vital Signs (Most Recent):  Temp: 97.8 °F (36.6 °C) (23 1100)  Pulse: (!) 54 (23 1241)  Resp: (!) 22 (23 1241)  BP: (!) 102/51 (23 1230)  SpO2: (!) 92 % (23 1241) Vital Signs (24h Range):  Temp:  [97.8 °F (36.6 °C)-98.4 °F (36.9 °C)] 97.8 °F (36.6 °C)  Pulse:  [41-64] 54  Resp:  [10-54] 22  SpO2:  [73 %-100 %] 92 %  BP: ()/(39-62) 102/51     Weight: 55.8 kg (123 lb)  Body mass index is 22.5 kg/m².     Physical Exam  Vitals and nursing note reviewed.   Constitutional:       General: She is in acute distress.      Appearance: She is ill-appearing.   HENT:      Head: Normocephalic and atraumatic.      Nose: Nose normal.      Mouth/Throat:      Mouth: Mucous membranes are moist.   Eyes:      Extraocular Movements: Extraocular movements intact.   Cardiovascular:      Rate and Rhythm: Normal rate.      Pulses: Normal pulses.      Heart sounds: No murmur heard.  Pulmonary:      Effort: Pulmonary effort is normal. No respiratory distress.   Abdominal:      General: Abdomen is flat.      Palpations: Abdomen is soft.      Tenderness: There is no abdominal tenderness.   Musculoskeletal:      Right lower leg: No edema.      Left lower leg: No edema.   Skin:     General: Skin is warm.      Capillary Refill: Capillary refill takes less than 2 seconds.   Neurological:      Mental Status: She is alert.      Comments: Alert, answers simples questions, follows commands                 Significant Labs: All pertinent labs within the past 24 hours have been reviewed.    Significant Imaging: I have reviewed all pertinent imaging results/findings within the past 24 hours.

## 2023-08-03 NOTE — PLAN OF CARE
Patient accepted at [facility].  Call report information given to nurseConi.      ADT 30 order placed for [employer] with high flow O2.  Requested  time: 1645  If transportation does not arrive at ETA time nurse will be instructed to follow protocol for transportation below:  How can I get in touch directly with dispatch, if needed?                 Non-emergent (stretcher): [phone]  option 6     ++NURSING:  If Stretcher does not arrive at requested time please call the above Non Emergent Dispatcher.  If issue not resolved please escalate to your charge nurse for further instructions.       08/03/23 1542   Final Note   Assessment Type Final Discharge Note   Anticipated Discharge Disposition [facility]   Post-Acute Status   Hospice Status Set-up Complete/Auth obtained   Discharge Delays (!) [facility] Arranged Transportation

## 2023-08-03 NOTE — PROGRESS NOTES
Pharmacokinetic Assessment Follow Up: IV Vancomycin    Vancomycin serum concentration assessment(s):    The random level was drawn correctly and can be used to guide therapy at this time. The measurement is within the desired definitive target range of 10 to 20 mcg/mL.    Vancomycin Regimen Plan:    Give 500 mg today.  Re-dose when the random level is less than 20 mcg/mL, next level to be drawn at 0300 on 8/4/2023    Drug levels (last 3 results):  Recent Labs   Lab Result Units 08/03/23  0802   Vancomycin, Random ug/mL 12.4       Pharmacy will continue to follow and monitor vancomycin.    Please contact pharmacy at extension [phone] for questions regarding this assessment.    Thank you for the consult,   Azar Zelaya Jr       Patient brief summary:  Nelsy Marino is a 82 y.o. female initiated on antimicrobial therapy with IV Vancomycin for treatment of bacteremia    Drug Allergies:   Review of patient's allergies indicates:   Allergen Reactions    Motrin [ibuprofen] Itching       Actual Body Weight:   55.8 kg    Renal Function:   Estimated Creatinine Clearance: 8.4 mL/min (A) (based on SCr of 4.1 mg/dL (H)).,     Dialysis Method (if applicable):  N/A    CBC (last 72 hours):  Recent Labs   Lab Result Units 08/01/23  0914 08/01/23 2129 08/02/23  0531 08/03/23  0006   WBC K/uL 7.28 5.61 7.41 8.48   Hemoglobin g/dL 7.8* 7.5* 7.6* 7.3*   Hematocrit % 25.5* 24.6* 25.4* 23.9*   Platelets K/uL 425 397 524* 550*   Gran % % 72.9 73.7* 65.0 72.0   Lymph % % 14.6* 13.7* 11.0* 14.5*   Mono % % 5.4 5.7 8.0 8.5   Eosinophil % % 6.3 6.1 4.0 4.4   Basophil % % 0.4 0.4 0.0 0.2   Differential Method  Automated Automated Manual Automated       Metabolic Panel (last 72 hours):  Recent Labs   Lab Result Units 08/01/23  0914 08/01/23  0932 08/01/23 2129 08/01/23  2250 08/02/23  0531 08/03/23  0006   Sodium mmol/L 142  --  143  --  141 140   Potassium mmol/L 3.3*  --  3.6  --  3.4* 5.1   Chloride mmol/L 104  --  106  --  107 107    CO2 mmol/L 28  --  24  --  23 19*   Glucose mg/dL 80  --  79  --  136* 156*   Glucose, UA   --  Negative  --  Negative  --   --    BUN mg/dL 57*  --  56*  --  54* 62*   Creatinine mg/dL 3.5*  --  3.8*  --  3.6* 4.1*   Albumin g/dL 1.9*  --  1.7*  --  1.7* 1.6*   Total Bilirubin mg/dL 0.4  --  0.3  --  0.3 0.3   Alkaline Phosphatase U/L 121  --  114  --  116 123   AST U/L 50*  --  46*  --  51* 48*   ALT U/L 12  --  11  --  14 13   Magnesium mg/dL  --   --  2.1  --  2.0  --    Phosphorus mg/dL  --   --   --   --  7.0*  --        Vancomycin Administrations:  vancomycin given in the last 96 hours                     vancomycin (VANCOCIN) 1,000 mg in dextrose 5 % (D5W) 250 mL IVPB (Vial-Mate) (mg) 1,000 mg New Bag 08/01/23 2341                    Microbiologic Results:  Microbiology Results (last 7 days)       Procedure Component Value Units Date/Time    Blood culture #2 **CANNOT BE ORDERED STAT** [[id number]] Collected: 08/01/23 2145    Order Status: Completed Specimen: Blood from Peripheral, Antecubital, Right Updated: 08/02/23 2303     Blood Culture, Routine No Growth to date      No Growth to date    Blood culture #1 **CANNOT BE ORDERED STAT** [[id number]] Collected: 08/01/23 2215    Order Status: Completed Specimen: Blood from Peripheral, Forearm, Right Updated: 08/02/23 2303     Blood Culture, Routine No Growth to date      No Growth to date    Urine culture [[id number]] Collected: 08/01/23 2250    Order Status: No result Specimen: Urine Updated: 08/01/23 2323

## 2023-08-03 NOTE — SUBJECTIVE & OBJECTIVE
Past Medical History:   Diagnosis Date    Amputation of toes     CHF (congestive heart failure)     Diabetes mellitus     DVT (deep venous thrombosis)     Hypertension     Miscarriage     On home oxygen therapy     PRN       Past Surgical History:   Procedure Laterality Date     femoral vascular surgery       ANGIOGRAPHY OF LOWER EXTREMITY Right 2/23/2023    Procedure: Angiogram Extremity Unilateral;  Surgeon: Danny Dunbar MD;  Location: [facility] OR;  Service: Vascular;  Laterality: Right;  RN PHONE PREOP 2/20/2023----CK CONSENT    ANGIOGRAPHY OF LOWER EXTREMITY Right 4/4/2023    Procedure: Angiogram Extremity Unilateral - possible endovascular intervention;  Surgeon: René Simms MD;  [facility];  Service: Vascular;  Laterality: Right;  19.6 mins.  740.81 mGy  73.3399 Gy.cm  58ml dye  local - 1ml    COLONOSCOPY N/A 5/18/2023    Procedure: COLONOSCOPY;  Surgeon: Cuong Aguiar MD;  Location: [facility] ENDO;  Service: Endoscopy;  Laterality: N/A;    CREATION OF FEMORAL-FEMORAL ARTERY BYPASS WITH GRAFT Bilateral 3/2/2023    Procedure: CREATION, BYPASS, ARTERIAL, FEMORAL TO FEMORAL, USING GRAFT, LEFT FEMORAL ENDARTERECTOMY, LEFT ILIAC ARTERY STENT PLACEMENT, RIGHT LOWER EXTREMITY ANGIOGRAM AND RIGHT SFA  ANGIOPLASTY;  Surgeon: Danny Dunbar MD;  Location: [facility] OR [address] [place] [address];  Service: Vascular;  Laterality: Bilateral;   744.47 MGY  175.36 Gycm  Flouro time 20.5 mins      CREATION OF ILIOFEMORAL ARTERY BYPASS Right 3/20/2019    Procedure: CREATION, BYPASS, ARTERIAL, ILIAC TO FEMORAL, RIGHT LOWER EXTREMITY, RIGHT PROFUNDAPLASTY;  Surgeon: Danny Dunbar MD;  Location: [facility] OR;  Service: Vascular;  Laterality: Right;  11:00AM START PER ANNE RICHARDS PREOP 3/13/2019  ACT'S, CELL SAVER, GRAFTS, BOOK WATER---NEED H/P  CONSENT IN AM------HGBA1C    CREATION OF MUSCLE ROTATIONAL FLAP Left 3/31/2023    Procedure: CREATION, FLAP, MUSCLE ROTATION;  Surgeon: Brandon Smiley MD;  Location:  [facility] OR 2ND FLR;  Service: Plastics;  Laterality: Left;    ENDOSCOPY OF PROXIMAL SMALL INTESTINE N/A 5/18/2023    Procedure: ENTEROSCOPY, PROXIMAL;  Surgeon: Cuong Aguiar MD;  Location: [facility] ENDO;  Service: Endoscopy;  Laterality: N/A;    ESOPHAGOGASTRODUODENOSCOPY N/A 3/17/2023    Procedure: EGD (ESOPHAGOGASTRODUODENOSCOPY);  Surgeon: Gee Rodriguez MD;  Location: [facility] ENDO (2ND FLR);  Service: Endoscopy;  Laterality: N/A;    ESOPHAGOGASTRODUODENOSCOPY N/A 4/11/2023    Procedure: EGD (ESOPHAGOGASTRODUODENOSCOPY);  Surgeon: Nazanin Kaiser MD;  Location: [facility] ENDO (2ND FLR);  Service: Endoscopy;  Laterality: N/A;    ESOPHAGOGASTRODUODENOSCOPY N/A 4/26/2023    Procedure: EGD (ESOPHAGOGASTRODUODENOSCOPY);  Surgeon: Gee Rodriguez MD;  Location: [facility] ENDO (2ND FLR);  Service: Endoscopy;  Laterality: N/A;    ESOPHAGOGASTRODUODENOSCOPY N/A 5/2/2023    Procedure: EGD (ESOPHAGOGASTRODUODENOSCOPY);  Surgeon: Cuong Conteh MD;  Location: [facility] ENDO (2ND FLR);  Service: Endoscopy;  Laterality: N/A;    FOOT AMPUTATION THROUGH METATARSAL Right 3/4/2023    Procedure: AMPUTATION, FOOT, TRANSMETATARSAL;  Surgeon: Benjamin Godfrey DPM;  [facility];  Service: Podiatry;  Laterality: Right;    HYSTERECTOMY      ?unsure cervix present    INCISION AND DRAINAGE Right 4/12/2019    Procedure: Incision and Drainage - R groin washout, possible muscle flap;  Surgeon: Danny Dunbar MD;  Location: [facility] OR 2ND FLR;  Service: Vascular;  Laterality: Right;  groin washout    INCISION AND DRAINAGE OF GROIN Right 5/3/2019    Procedure: Incision and Drainage R groin;  Surgeon: Danny Dunbar MD;  [facility];  Service: Vascular;  Laterality: Right;    WOUND DEBRIDEMENT Left 3/31/2023    Procedure: DEBRIDEMENT, WOUND;  Surgeon: René Simms MD;  Location: [facility] OR 2ND FLR;  Service: Vascular;  Laterality: Left;       Review of patient's allergies indicates:   Allergen Reactions    Motrin  [ibuprofen] Itching       Medications:  Continuous Infusions:   NORepinephrine bitartrate-D5W 0.65 mcg/kg/min (23 1200)    sodium bicarbonate 100 mEq in dextrose 5 % (D5W) 1,000 mL infusion 75 mL/hr at 23 1200     Scheduled Meds:   albuterol-ipratropium  3 mL Nebulization Q6H WAKE    atorvastatin  40 mg Oral QHS    ceFEPime (MAXIPIME) IVPB  2 g Intravenous Q24H    famotidine (PF)  20 mg Intravenous Daily    heparin (porcine)  5,000 Units Subcutaneous Q8H    LIDOcaine  1 patch Transdermal Q24H    mupirocin   Nasal BID     PRN Meds:acetaminophen, albuterol-ipratropium, calcium gluconate IVPB, calcium gluconate IVPB, calcium gluconate IVPB, dextrose 50%, glucagon (human recombinant), HYDROmorphone, insulin aspart U-100, lorazepam, magnesium sulfate IVPB, magnesium sulfate IVPB, melatonin, ondansetron, potassium chloride **AND** potassium chloride **AND** potassium chloride, prochlorperazine, sodium chloride 0.9%, sodium phosphate 15 mmol in dextrose 5 % (D5W) 250 mL IVPB, sodium phosphate 20.01 mmol in dextrose 5 % (D5W) 250 mL IVPB, sodium phosphate 30 mmol in dextrose 5 % (D5W) 250 mL IVPB, Pharmacy to dose Vancomycin consult **AND** vancomycin - pharmacy to dose    Family History       Problem Relation (Age of Onset)    Diabetes Father    Hypertension Father          Tobacco Use    Smoking status: Former     Current packs/day: 0.00     Types: Cigarettes     Quit date: 1980     Years since quittin.6    Smokeless tobacco: Never   Substance and Sexual Activity    Alcohol use: No    Drug use: No    Sexual activity: Not Currently     Partners: Male       Review of Systems   Unable to perform ROS: Acuity of condition     Objective:     Vital Signs (Most Recent):  Temp: 97.8 °F (36.6 °C) (23 1100)  Pulse: (!) 54 (23 1241)  Resp: (!) 22 (23 1241)  BP: (!) 102/51 (23 1230)  SpO2: (!) 92 % (23 1241) Vital Signs (24h Range):  Temp:  [97.8 °F (36.6 °C)-98.4 °F (36.9 °C)] 97.8 °F  (36.6 °C)  Pulse:  [41-64] 54  Resp:  [10-54] 22  SpO2:  [73 %-100 %] 92 %  BP: ()/(39-62) 102/51     Weight: 55.8 kg (123 lb)  Body mass index is 22.5 kg/m².       Physical Exam  Constitutional:       Comments: Elderly and frail appearing, lying in bed, looks uncomfortable   HENT:      Head:      Comments: Temporal wasting     Mouth/Throat:      Mouth: Mucous membranes are moist.   Cardiovascular:      Rate and Rhythm: Bradycardia present.   Pulmonary:      Comments: Increased work of breathing at times  Psychiatric:      Comments: Agitated at times         Significant Labs: All pertinent labs within the past 24 hours have been reviewed.  CBC:   Recent Labs   Lab 08/03/23 0006   WBC 8.48   HGB 7.3*   HCT 23.9*   MCV 82   *     BMP:  Recent Labs   Lab 08/03/23 0006   *      K 5.1      CO2 19*   BUN 62*   CREATININE 4.1*   CALCIUM 7.2*     LFT:  Lab Results   Component Value Date    AST 48 (H) 08/03/2023    ALKPHOS 123 08/03/2023    BILITOT 0.3 08/03/2023     Albumin:   Albumin   Date Value Ref Range Status   08/03/2023 1.6 (L) 3.5 - 5.2 g/dL Final     Protein:   Total Protein   Date Value Ref Range Status   08/03/2023 5.7 (L) 6.0 - 8.4 g/dL Final     Lactic acid:   Lab Results   Component Value Date    LACTATE 2.2 08/01/2023    LACTATE 1.9 07/10/2023       Significant Imaging: I have reviewed all pertinent imaging results/findings within the past 24 hours.

## 2023-08-03 NOTE — PLAN OF CARE
Problem: Infection  Goal: Absence of Infection Signs and Symptoms  Outcome: Ongoing, Not Progressing     Problem: Adult Inpatient Plan of Care  Goal: Plan of Care Review  Outcome: Ongoing, Not Progressing  Goal: Patient-Specific Goal (Individualized)  Outcome: Ongoing, Not Progressing  Goal: Absence of Hospital-Acquired Illness or Injury  Outcome: Ongoing, Not Progressing  Goal: Optimal Comfort and Wellbeing  Outcome: Ongoing, Not Progressing  Goal: Readiness for Transition of Care  Outcome: Ongoing, Not Progressing     Problem: Diabetes Comorbidity  Goal: Blood Glucose Level Within Targeted Range  Outcome: Ongoing, Not Progressing     Problem: Adjustment to Illness (Sepsis/Septic Shock)  Goal: Optimal Coping  Outcome: Ongoing, Not Progressing     Problem: Bleeding (Sepsis/Septic Shock)  Goal: Absence of Bleeding  Outcome: Ongoing, Not Progressing     Problem: Glycemic Control Impaired (Sepsis/Septic Shock)  Goal: Blood Glucose Level Within Desired Range  Outcome: Ongoing, Not Progressing     Problem: Infection Progression (Sepsis/Septic Shock)  Goal: Absence of Infection Signs and Symptoms  Outcome: Ongoing, Not Progressing     Problem: Nutrition Impaired (Sepsis/Septic Shock)  Goal: Optimal Nutrition Intake  Outcome: Ongoing, Not Progressing     Problem: Fluid and Electrolyte Imbalance (Acute Kidney Injury/Impairment)  Goal: Fluid and Electrolyte Balance  Outcome: Ongoing, Not Progressing     Problem: Oral Intake Inadequate (Acute Kidney Injury/Impairment)  Goal: Optimal Nutrition Intake  Outcome: Ongoing, Not Progressing     Problem: Renal Function Impairment (Acute Kidney Injury/Impairment)  Goal: Effective Renal Function  Outcome: Ongoing, Not Progressing     Problem: Impaired Wound Healing  Goal: Optimal Wound Healing  Outcome: Ongoing, Not Progressing     Problem: Skin Injury Risk Increased  Goal: Skin Health and Integrity  Outcome: Ongoing, Not Progressing     Problem: Fall Injury Risk  Goal: Absence of  Fall and Fall-Related Injury  Outcome: Ongoing, Not Progressing     Problem: Coping Ineffective  Goal: Effective Coping  Outcome: Ongoing, Not Progressing     Problem: Fluid Imbalance (Pneumonia)  Goal: Fluid Balance  Outcome: Ongoing, Not Progressing     Problem: Infection (Pneumonia)  Goal: Resolution of Infection Signs and Symptoms  Outcome: Ongoing, Not Progressing     Problem: Respiratory Compromise (Pneumonia)  Goal: Effective Oxygenation and Ventilation  Outcome: Ongoing, Not Progressing

## 2023-08-03 NOTE — PLAN OF CARE
Pt free from fall/injury, remains on bedrest, turned Q2. Pt lethargic but wakes up and answers appropriately with mild confusion. Multiple wounds noted on admit, charted, wound care consulted, new issues of scratching noted during am bath, also swelling to vaginal area r/t itching as per pt admission.  No s/s of infection noted, afebrile, Levo still on @ 0.65. [place] in place with only 20ml output for whole shift

## 2023-08-03 NOTE — ASSESSMENT & PLAN NOTE
- Consult for advance care planning and family support in pt with underlying severe dementia, debility (bedbound at baseline), CKD currently in ICU with septic shock, acute renal failure, and acute respiratory failure. She remains on abx and pressors as of today; nephrology has been consulted for LOLY on CKD and has determined that she is not a candidate for hemodialysis. Chart reviewed in depth; discussed pt during MDT rounds and then separately with nephrology staff (Dr Murillo).   - Along with Dr Quiñones (hospitalist) and Dr Murillo, met with pt's  Dimas Marino (legally-authorized surrogate medical decision maker). They have been  for 59 years; he was tearful when stating this. Emotional support provided.   - Medical update provided by Dr Murillo, who stated that patient is not a candidate for hemodialysis, and that she is dying from her renal failure and other illnesses. He verbalized understanding of this, and said he knows she is at the end of her life. In discussing what is most important to pt and him moving forward, he was clear that providing comfort and avoiding unnecessary suffering is the most important thing.   - He was very forthcoming that he previously desired to start hospice care at home, though received significant pushback from his family members. Told him I am sorry to hear this, and we will do our best to ensure she is cared for in the way that she should be. He said that would be helpful.   - Following visit, introduced him to SW/CM Roscoe Nielsen. Discussed that patient likely qualifies for inpatient hospice; he was interested in hearing more about this option. Appreciate SW/CM assistance.   - Following this, met with pt at bedside. During visit, she was lying in bed with her eyes closed, occasionally with agitation and increased work of breathing. In addition to her  Dimas Marino, her brother (who is also named Dimas Marino) was present in room. Medical update provided, and shared  transparent concern that patient is approaching the end of her life. We discussed plan for hospice transition; he asked appropriate questions (ie, about visitation rules). Similar to pt's , he is hopeful that his sister is as comfortable as possible.   - Will follow up with pt and family

## 2023-08-03 NOTE — CONSULTS
2005:     Consult was conducted with the patient, medical orvider and the patient's sister.   Medical provider reports that the patient has been accompanied with by her spouse. He was recently relieved by the patient's sister.  The patient presented as being disoriented, however she consented to prayers.  The patient's sister disclosed that when the patient is not hospitalized, she is taken care of by her spouse and her siblings.  Prayers were offered for the patient and her family.  The Spiritual Care Team will continue to provide spiritual support to the patient and her family.        PRAVIN Pal   [phone]

## 2023-08-03 NOTE — CONSULTS
Reason for consultation:  LOLY    HPI:  83 yo lady with h/o HTN, DM type 2, DVT, PVD, chronic respiratory failure on home oxygen prn, diastolic CHF was admitted for altered mental status, deptic shock.  Nephrology is consulted for evaluation of acute renal failure.    PMH:  As above.    Scheduled Meds:   albuterol-ipratropium  3 mL Nebulization Q6H WAKE    atorvastatin  40 mg Oral QHS    ceFEPime (MAXIPIME) IVPB  2 g Intravenous Q24H    famotidine (PF)  20 mg Intravenous Daily    heparin (porcine)  5,000 Units Subcutaneous Q8H    LIDOcaine  1 patch Transdermal Q24H    mupirocin   Nasal BID    vancomycin (VANCOCIN) IV (PEDS and ADULTS)  500 mg Intravenous Once       Review of patient's allergies indicates:   Allergen Reactions    Motrin [ibuprofen] Itching     Family history:  Non contributory    Social history:  remote h/o tobacco use, no alcohol    Vital Signs Range (Last 24H):  Temp:  [97.8 °F (36.6 °C)-98.4 °F (36.9 °C)]   Pulse:  [41-64]   Resp:  [12-54]   BP: ()/(39-62)   SpO2:  [73 %-100 %]     I & O (Last 24H):  Intake/Output Summary (Last 24 hours) at 8/3/2023 0953  Last data filed at 8/3/2023 0900  Gross per 24 hour   Intake 1701.59 ml   Output 35 ml   Net 1666.59 ml           Physical Exam:  General appearance: well developed, cachectic, no distress  Lungs:  clear to auscultation bilaterally and normal respiratory effort  Heart: regular rate and rhythm  Abdomen:  soft, normal bowel sounds  Extremities: edema trace    Laboratory:  I have reviewed all pertinent lab results within the past 24 hours.  CBC:   Recent Labs   Lab 08/03/23  0006   WBC 8.48   RBC 2.91*   HGB 7.3*   HCT 23.9*   *   MCV 82   MCH 25.1*   MCHC 30.5*     CMP:   Recent Labs   Lab 08/03/23  0006   *   CALCIUM 7.2*   ALBUMIN 1.6*   PROT 5.7*      K 5.1   CO2 19*      BUN 62*   CREATININE 4.1*   ALKPHOS 123   ALT 13   AST 48*   BILITOT 0.3     Cardiac markers:   Recent Labs   Lab 08/01/23 2129   TROPONINI  0.124*     Microbiology Results (last 7 days)       Procedure Component Value Units Date/Time    Blood culture #2 **CANNOT BE ORDERED STAT** [[id number]] Collected: 08/01/23 2145    Order Status: Completed Specimen: Blood from Peripheral, Antecubital, Right Updated: 08/02/23 2303     Blood Culture, Routine No Growth to date      No Growth to date    Blood culture #1 **CANNOT BE ORDERED STAT** [[id number]] Collected: 08/01/23 2215    Order Status: Completed Specimen: Blood from Peripheral, Forearm, Right Updated: 08/02/23 2303     Blood Culture, Routine No Growth to date      No Growth to date    Urine culture [[id number]] Collected: 08/01/23 2250    Order Status: No result Specimen: Urine Updated: 08/01/23 2323          Recent Labs   Lab 08/01/23 2250   COLORU Brown*   SPECGRAV 1.030   PHUR 5.0   PROTEINUA 1+*   BACTERIA Occasional   NITRITE Negative   LEUKOCYTESUR 3+*   UROBILINOGEN 4.0-6.0*   HYALINECASTS 23*       Assessment & Plan    LOLY - ATN due to hypotension  Septic shock  Metabolic encephalopathy  DM type 2  Anemia of chronic disease    Continue present Rx  Supportive care  Continue IVF gently  Discussed with patient  she is not a candidate for dialysis  Conservative treatment only  Consider comfort care/hospice    Thank you for the courtesy of the consultation          Tiffany Murillo  8/3/2023

## 2023-08-03 NOTE — ASSESSMENT & PLAN NOTE
- Nephrology consulted; pt is not a candidate for HD.   - Illness trajectory education provided to family   - Given that patient is symptomatic from this and her concurrent disease processes, recommend inpatient hospice care

## 2023-08-03 NOTE — NURSING
[facility] [place] [place] 4 [place]      8/3/2023       Skin assessed on: Q Shift      [] No Pressure Injuries Present    [x]Prevention Measures Documented    [x] Yes LDA  for Pressure Injury Previously documented     [] Yes New Pressure Injury Discovered   [] LDA for New Pressure Injury Added      Attending RN:  Edwige Wall RN     Second RN:  SUSIE Ugalde

## 2023-08-03 NOTE — NURSING
[facility] [place] 4 [place]      8/3/2023       Skin assessed on: Q Shift      [] No Pressure Injuries Present    []Prevention Measures Documented    [x] Yes LDA  for Pressure Injury Previously documented     [] Yes New Pressure Injury Discovered   [] LDA for New Pressure Injury Added      Attending RN:  Tram Jackson RN     Second RN:  SUSIE Gibbons

## 2023-08-03 NOTE — CONSULTS
[facility] - Intensive Care  Palliative Medicine  Consult Note    Patient Name: Nelsy Marino  MRN: [id number]  Admission Date: 8/1/2023  Hospital Length of Stay: 2 days  Code Status: DNR   Attending Provider: Jerilyn Chairez, *  Consulting Provider: Betzy Ledesma MD  Primary Care Physician: Juan F Garay -  Principal Problem:Septic shock    Patient information was obtained from spouse/SO, relative(s), past medical records, ER records and primary team.      Inpatient consult to Palliative Care  Consult performed by: Betzy Ledesma MD  Consult ordered by: Jerilyn Chairez MD  Reason for consult: Advance Care Planning         Assessment/Plan:     Advance Care Planning       8/3/23  - Consult for advance care planning and family support in pt with underlying severe dementia, debility (bedbound at baseline), CKD currently in ICU with septic shock, acute renal failure, and acute respiratory failure. She remains on abx and pressors as of today; nephrology has been consulted for LOLY on CKD and has determined that she is not a candidate for hemodialysis. Chart reviewed in depth; discussed pt during MDT rounds and then separately with nephrology staff (Dr Murillo).   - Along with Dr Quiñones (hospitalist) and Dr Murillo, met with pt's  Dimas Marino (legally-authorized surrogate medical decision maker). They have been  for 59 years; he was tearful when stating this. Emotional support provided.   - Medical update provided by Dr Murillo, who stated that patient is not a candidate for hemodialysis, and that she is dying from her renal failure and other illnesses. He verbalized understanding of this, and said he knows she is at the end of her life. In discussing what is most important to pt and him moving forward, he was clear that providing comfort and avoiding unnecessary suffering is the most important thing.   - He was very forthcoming that he previously desired to start hospice care at home, though received  significant pushback from his family members. Told him I am sorry to hear this, and we will do our best to ensure she is cared for in the way that she should be. He said that would be helpful.   - Following visit, introduced him to SW/CM Roscoe Nielsen. Discussed that patient likely qualifies for inpatient hospice; he was interested in hearing more about this option. Appreciate SW/CM assistance.   - Following this, met with pt at bedside. During visit, she was lying in bed with her eyes closed, occasionally with agitation and increased work of breathing. In addition to her  Dimas Marino, her brother (who is also named Dimas Marino) was present in room. Medical update provided, and shared transparent concern that patient is approaching the end of her life. We discussed plan for hospice transition; he asked appropriate questions (ie, about visitation rules). Similar to pt's , he is hopeful that his sister is as comfortable as possible.   - Will follow up with pt and family     Pulmonary  Acute on chronic respiratory failure with hypoxia  - Management per primary team; currently on vapotherm   - Contributes to hospice qualification    Renal/  LOLY (acute kidney injury)  - Nephrology consulted; pt is not a candidate for HD.   - Illness trajectory education provided to family   - Given that patient is symptomatic from this and her concurrent disease processes, recommend inpatient hospice care     ID  Septic shock  - Remains on pressors and abx  - Contributes to hospice qualification    Endocrine  Moderate malnutrition  - Contributes to hospice qualification     Palliative Care  End of life care  - Pt's  would prefer to focus on patient's comfort at this time, which is reasonable given her multiorgan failure and lack of candidacy for hemodialysis   - Start IV hydromorphone 0.5 mg Q1H PRN nonverbal signs of dyspnea or pain  - Start IV lorazepam 1 mg Q4H PRN anxiety, agitation  - Will continue IV  "pressors to help facilitate inpatient hospice transfer, as well as vapotherm      Thank you for your consult. I will follow-up with patient. Please contact us if you have any additional questions.    ERICA Desai  Palliative Medicine Staff   [phone]    Total visit time: 86 minutes    > 50% of 70 min visit spent in chart review, face to face discussion of symptom assessment, coordination of care with other specialists, and discharge planning.    16 min ACP time spent: goals of care, emotional support, formulating and communicating prognosis, exploring burden/ benefit of various approaches of treatment.     Subjective:     HPI: (From H&P) "This is an 82-year-old female with a past medical history of HFpEF (EF: 75%), severe PAD , hypertension, type 2 diabetes, history of DVT who presents with altered mental status.     Patient initially presented to the ED on 8/1 for altered mental status. Upon EMS arrival, patient was noted to be hypotensive and had pinpoint pupils and was given narcan and fluids. Reportedly, she has been more confused and less active. Associated symptoms include decreased po intake. Patient was noted to be hypotensive and was given fluids, and ceftriaxone. Her BP did not respond to fluids, however, family did not want pressors and wished to proceed with hospice care. Social work was consulted and patient was discharged to home with hospice. However, patient's  declined equipment delivery and wanted home health only, so patient was not admitted to hospice. Patient was brought back to the ER later the same day for further management/evaluation as their wishes have changed to include treatment/pressors.      In the ED, the patient was bradycardic, tachypneic, hypoxic (requiring 6L O2) and hypotension (requiring Levophed). Labs were remarkable for an elevated creatinine (3.8 - baseline of 1.3), elevated BNP (2091), elevated troponin (0.124 < 0.113), elevated procalcitonin (1.11). ABG " "showed a PH of 7.315, PCO2 of 48.8. UA showed +3 leukocytes, >100 WBCs, 41 RBCs and occasional bacteria. CXR showed pulmonary vascular congestion, pulmonary edema and small bilateral pleural effusion. She was given 1L of NS, vancomycin, cefepime and was started on levophed. A central line was placed. Patient was admitted for further management."    Palliative medicine is consulted for advance care planning; for details of visit with pt and family, see ACP section of plan.       Past Medical History:   Diagnosis Date    Amputation of toes     CHF (congestive heart failure)     Diabetes mellitus     DVT (deep venous thrombosis)     Hypertension     Miscarriage     On home oxygen therapy     PRN       Past Surgical History:   Procedure Laterality Date     femoral vascular surgery       ANGIOGRAPHY OF LOWER EXTREMITY Right 2/23/2023    Procedure: Angiogram Extremity Unilateral;  Surgeon: Danny Dunbar MD;  Location: [facility] OR;  Service: Vascular;  Laterality: Right;  RN PHONE PREOP 2/20/2023----CK CONSENT    ANGIOGRAPHY OF LOWER EXTREMITY Right 4/4/2023    Procedure: Angiogram Extremity Unilateral - possible endovascular intervention;  Surgeon: René Simms MD;  Location: [facility] OR [address] [place] [address];  Service: Vascular;  Laterality: Right;  19.6 mins.  740.81 mGy  73.3399 Gy.cm  58ml dye  local - 1ml    COLONOSCOPY N/A 5/18/2023    Procedure: COLONOSCOPY;  Surgeon: Cuong Aguiar MD;  Location: [facility];  Service: Endoscopy;  Laterality: N/A;    CREATION OF FEMORAL-FEMORAL ARTERY BYPASS WITH GRAFT Bilateral 3/2/2023    Procedure: CREATION, BYPASS, ARTERIAL, FEMORAL TO FEMORAL, USING GRAFT, LEFT FEMORAL ENDARTERECTOMY, LEFT ILIAC ARTERY STENT PLACEMENT, RIGHT LOWER EXTREMITY ANGIOGRAM AND RIGHT SFA  ANGIOPLASTY;  Surgeon: Danny Dunbar MD;  Location: [address];  Service: Vascular;  Laterality: Bilateral;   744.47 MGY  175.36 Gycm  Flouro time 20.5 mins      CREATION OF ILIOFEMORAL ARTERY " BYPASS Right 3/20/2019    Procedure: CREATION, BYPASS, ARTERIAL, ILIAC TO FEMORAL, RIGHT LOWER EXTREMITY, RIGHT PROFUNDAPLASTY;  Surgeon: Danny Dunbar MD;  Location: [facility] OR;  Service: Vascular;  Laterality: Right;  11:00AM START PER NAZANIN RICHARDS PREOP 3/13/2019  ACT'S, CELL SAVER, GRAFTS, BOOK WATER---NEED H/P  CONSENT IN AM------HGBA1C    CREATION OF MUSCLE ROTATIONAL FLAP Left 3/31/2023    Procedure: CREATION, FLAP, MUSCLE ROTATION;  Surgeon: Brandon Smiley MD;  [facility];  Service: Plastics;  Laterality: Left;    ENDOSCOPY OF PROXIMAL SMALL INTESTINE N/A 5/18/2023    Procedure: ENTEROSCOPY, PROXIMAL;  Surgeon: Cuong Aguiar MD;  Location: [facility];  Service: Endoscopy;  Laterality: N/A;    ESOPHAGOGASTRODUODENOSCOPY N/A 3/17/2023    Procedure: EGD (ESOPHAGOGASTRODUODENOSCOPY);  Surgeon: Gee Rodriguez MD;  Location: [facility] ([facility]);  Service: Endoscopy;  Laterality: N/A;    ESOPHAGOGASTRODUODENOSCOPY N/A 4/11/2023    Procedure: EGD (ESOPHAGOGASTRODUODENOSCOPY);  Surgeon: Nazanin Kaiser MD;  Location: [facility] (2ND FLR);  Service: Endoscopy;  Laterality: N/A;    ESOPHAGOGASTRODUODENOSCOPY N/A 4/26/2023    Procedure: EGD (ESOPHAGOGASTRODUODENOSCOPY);  Surgeon: Gee Rodriguez MD;  Location: [facility]);  Service: Endoscopy;  Laterality: N/A;    ESOPHAGOGASTRODUODENOSCOPY N/A 5/2/2023    Procedure: EGD (ESOPHAGOGASTRODUODENOSCOPY);  Surgeon: Cuong Conteh MD;  Location: [facility]O ([facility]);  Service: Endoscopy;  Laterality: N/A;    FOOT AMPUTATION THROUGH METATARSAL Right 3/4/2023    Procedure: AMPUTATION, FOOT, TRANSMETATARSAL;  Surgeon: Benjamin Godfrey DPM;  Location: [facility] OR 2ND FLR;  Service: Podiatry;  Laterality: Right;    HYSTERECTOMY      ?unsure cervix present    INCISION AND DRAINAGE Right 4/12/2019    Procedure: Incision and Drainage - R groin washout, possible muscle flap;  Surgeon: Danny Dunbar MD;  Location: [facility] OR  2ND FLR;  Service: Vascular;  Laterality: Right;  groin washout    INCISION AND DRAINAGE OF GROIN Right 5/3/2019    Procedure: Incision and Drainage R groin;  Surgeon: Danny Dunbar MD;  Location: [facility] OR 2ND FLR;  Service: Vascular;  Laterality: Right;    WOUND DEBRIDEMENT Left 3/31/2023    Procedure: DEBRIDEMENT, WOUND;  Surgeon: René Simms MD;  Location: [facility] OR 2ND FLR;  Service: Vascular;  Laterality: Left;       Review of patient's allergies indicates:   Allergen Reactions    Motrin [ibuprofen] Itching       Medications:  Continuous Infusions:   NORepinephrine bitartrate-D5W 0.65 mcg/kg/min (08/03/23 1200)    sodium bicarbonate 100 mEq in dextrose 5 % (D5W) 1,000 mL infusion 75 mL/hr at 08/03/23 1200     Scheduled Meds:   albuterol-ipratropium  3 mL Nebulization Q6H WAKE    atorvastatin  40 mg Oral QHS    ceFEPime (MAXIPIME) IVPB  2 g Intravenous Q24H    famotidine (PF)  20 mg Intravenous Daily    heparin (porcine)  5,000 Units Subcutaneous Q8H    LIDOcaine  1 patch Transdermal Q24H    mupirocin   Nasal BID     PRN Meds:acetaminophen, albuterol-ipratropium, calcium gluconate IVPB, calcium gluconate IVPB, calcium gluconate IVPB, dextrose 50%, glucagon (human recombinant), HYDROmorphone, insulin aspart U-100, lorazepam, magnesium sulfate IVPB, magnesium sulfate IVPB, melatonin, ondansetron, potassium chloride **AND** potassium chloride **AND** potassium chloride, prochlorperazine, sodium chloride 0.9%, sodium phosphate 15 mmol in dextrose 5 % (D5W) 250 mL IVPB, sodium phosphate 20.01 mmol in dextrose 5 % (D5W) 250 mL IVPB, sodium phosphate 30 mmol in dextrose 5 % (D5W) 250 mL IVPB, Pharmacy to dose Vancomycin consult **AND** vancomycin - pharmacy to dose    Family History       Problem Relation (Age of Onset)    Diabetes Father    Hypertension Father          Tobacco Use    Smoking status: Former     Current packs/day: 0.00     Types: Cigarettes     Quit date: 1980     Years since  quittin.6    Smokeless tobacco: Never   Substance and Sexual Activity    Alcohol use: No    Drug use: No    Sexual activity: Not Currently     Partners: Male       Review of Systems   Unable to perform ROS: Acuity of condition     Objective:     Vital Signs (Most Recent):  Temp: 97.8 °F (36.6 °C) (23 1100)  Pulse: (!) 54 (23 1241)  Resp: (!) 22 (23 1241)  BP: (!) 102/51 (23 1230)  SpO2: (!) 92 % (23 1241) Vital Signs (24h Range):  Temp:  [97.8 °F (36.6 °C)-98.4 °F (36.9 °C)] 97.8 °F (36.6 °C)  Pulse:  [41-64] 54  Resp:  [10-54] 22  SpO2:  [73 %-100 %] 92 %  BP: ()/(39-62) 102/51     Weight: 55.8 kg (123 lb)  Body mass index is 22.5 kg/m².       Physical Exam  Constitutional:       Comments: Elderly and frail appearing, lying in bed, looks uncomfortable   HENT:      Head:      Comments: Temporal wasting     Mouth/Throat:      Mouth: Mucous membranes are moist.   Cardiovascular:      Rate and Rhythm: Bradycardia present.   Pulmonary:      Comments: Increased work of breathing at times  Psychiatric:      Comments: Agitated at times         Significant Labs: All pertinent labs within the past 24 hours have been reviewed.  CBC:   Recent Labs   Lab 23  0006   WBC 8.48   HGB 7.3*   HCT 23.9*   MCV 82   *     BMP:  Recent Labs   Lab 23  0006   *      K 5.1      CO2 19*   BUN 62*   CREATININE 4.1*   CALCIUM 7.2*     LFT:  Lab Results   Component Value Date    AST 48 (H) 2023    ALKPHOS 123 2023    BILITOT 0.3 2023     Albumin:   Albumin   Date Value Ref Range Status   2023 1.6 (L) 3.5 - 5.2 g/dL Final     Protein:   Total Protein   Date Value Ref Range Status   2023 5.7 (L) 6.0 - 8.4 g/dL Final     Lactic acid:   Lab Results   Component Value Date    LACTATE 2.2 2023    LACTATE 1.9 07/10/2023       Significant Imaging: I have reviewed all pertinent imaging results/findings within the past 24  hours.

## 2023-08-03 NOTE — PLAN OF CARE
Met with , Dimas and Dr Ledesma.  Mr Cochran chose [facility] due to location.  Patient choice form completed.  Referral sent via [facility].  [facility] at [facility] notified.       08/03/23 1108   Discharge Reassessment   Assessment Type Discharge Planning Reassessment   Did the patient's condition or plan change since previous assessment? Yes   Discharge Plan discussed with: Spouse/sig other   Name(s) and Number(s) , Dimas Marino at bedside   Communicated JUAN ALBERTO with patient/caregiver Yes   Discharge Plan A Inpatient Hospice   Discharge Plan B Hospice/home   DME Needed Upon Discharge  none   Why the patient remains in the hospital Requires continued medical care   Post-Acute Status   Post-Acute Authorization Hospice   Hospice Status Referrals Sent   Patient choice form signed by patient/caregiver   ( chose [facility] due to location)   Discharge Delays (!) Post-Acute Set-up

## 2023-08-03 NOTE — ASSESSMENT & PLAN NOTE
Patient with Hypoxic Respiratory failure which is Acute.  she is not on home oxygen. Supplemental oxygen was provided and noted- Oxygen Concentration (%):  [] 90    .   Signs/symptoms of respiratory failure include- tachypnea, increased work of breathing and respiratory distress. Contributing diagnoses includes - Pneumonia Labs and images were reviewed. Patient Has not had a recent ABG. Will treat underlying causes and adjust management of respiratory failure as follows-   On HFO.

## 2023-08-03 NOTE — ASSESSMENT & PLAN NOTE
Patient with acute kidney injury/acute renal failure likely due to acute tubular necrosis caused by prolonged hypotension LOLY is currently worsening. Baseline creatinine 1.0 - Labs reviewed- Renal function/electrolytes with Estimated Creatinine Clearance: 8.4 mL/min (A) (based on SCr of 4.1 mg/dL (H)). according to latest data. Monitor urine output and serial BMP and adjust therapy as needed. Avoid nephrotoxins and renally dose meds for GFR listed above.    ARF is worsening,consulted nephrology,not candidate for HD, and family updated,consulted palliative.

## 2023-08-03 NOTE — ASSESSMENT & PLAN NOTE
This patient does have evidence of infective focus  My overall impression is sepsis.  Source: Urinary Tract and Skin and Soft Tissue (location foot)  Antibiotics given-   Antibiotics (72h ago, onward)    Start     Stop Route Frequency Ordered    08/02/23 2300  ceFEPIme (MAXIPIME) 2 g in dextrose 5 % in water (D5W) 100 mL IVPB (MB+)         -- IV Every 24 hours (non-standard times) 08/02/23 0023    08/02/23 0900  mupirocin 2 % ointment         08/07/23 0859 Nasl 2 times daily 08/02/23 0009    08/01/23 2318  vancomycin - pharmacy to dose  (vancomycin IVPB (PEDS and ADULTS))        See Hyperspace for full Linked Orders Report.    -- IV pharmacy to manage frequency 08/01/23 2318        Latest lactate reviewed-  Recent Labs   Lab 08/01/23  0920 08/01/23  2129   LACTATE  --  2.2   POCLAC 1.33  --      Organ dysfunction indicated by Acute kidney injury, Acute respiratory failure and Encephalopathy    Fluid challenge Ideal Body Weight- The patient's ideal body weight is Ideal body weight: 50.1 kg (110 lb 7.2 oz) which will be used to calculate fluid bolus of 30 ml/kg for treatment of septic shock.      Post- resuscitation assessment Yes Perfusion exam was performed within 6 hours of septic shock presentation after bolus shows Adequate tissue perfusion assessed by non-invasive monitoring     Duo to aspiration pneumonia.  Will Start Pressors- Levophed for MAP of 65  Source control achieved by:   Antibiotics  F/U cultures

## 2023-08-03 NOTE — CONSULTS
[facility] - Intensive Care  Wound Care    Patient Name:  Nelsy Marino   MRN:  [id number]  Date: 8/3/2023  Diagnosis: Septic shock    History:     Past Medical History:   Diagnosis Date    Amputation of toes     CHF (congestive heart failure)     Diabetes mellitus     DVT (deep venous thrombosis)     Hypertension     Miscarriage     On home oxygen therapy     PRN       Social History     Socioeconomic History    Marital status:    Tobacco Use    Smoking status: Former     Current packs/day: 0.00     Types: Cigarettes     Quit date: 1980     Years since quittin.6    Smokeless tobacco: Never   Substance and Sexual Activity    Alcohol use: No    Drug use: No    Sexual activity: Not Currently     Partners: Male     Social Determinants of Health     Financial Resource Strain: Unknown (3/30/2023)    Overall Financial Resource Strain (CARDIA)     Difficulty of Paying Living Expenses: Patient refused   Food Insecurity: No Food Insecurity (2023)    Hunger Vital Sign     Worried About Running Out of Food in the Last Year: Never true     Ran Out of Food in the Last Year: Never true   Transportation Needs: No Transportation Needs (2023)    PRAPARE - Transportation     Lack of Transportation (Medical): No     Lack of Transportation (Non-Medical): No   Physical Activity: Inactive (2023)    Exercise Vital Sign     Days of Exercise per Week: 0 days     Minutes of Exercise per Session: 0 min   Stress: Unknown (3/30/2023)    [place] [employer] - Occupational Stress Questionnaire     Feeling of Stress : Patient refused   Social Connections: Unknown (2023)    Social Connection and Isolation Panel [NHANES]     Frequency of Communication with Friends and Family: More than three times a week     Frequency of Social Gatherings with Friends and Family: More than three times a week     Attends Religion Services: Patient refused     Active Member of Clubs or Organizations: Patient refused      Attends Club or Organization Meetings: Patient refused     Marital Status:    Housing Stability: Low Risk  (4/21/2023)    Housing Stability Vital Sign     Unable to Pay for Housing in the Last Year: No     Number of Places Lived in the Last Year: 1     Unstable Housing in the Last Year: No       Precautions:     Allergies as of 08/01/2023 - Reviewed 08/01/2023   Allergen Reaction Noted    Motrin [ibuprofen] Itching 08/02/2017       [facility] Assessment Details/Treatment   Nursing consult for altered skin integrity coccyx and buttocks  An 82 year old female readmitted 8/2/23 from home with septic shock; acute metabolic encephalopathy; advance care planning; acute on chronic respiratory failure with hypoxia; chronic diastolic heart failure; LOLY; diabetic foot infection; DM II; essential hypertension; PAD  8/3 WBC 8.48 Hgb 7.3 Hct 23.9  Alb 1.6 Weight 123 lbs  6/20 A1C 6.5  On Isolibrium mattress; Jb score 13  3/2/23 S/P LLE bypass and angioplasty & RLE angioplasty per Dr. Dunbar  3/4/23 S/P Right TMA per Dr. Godfrey  3/31/23 S/P Creation of left muscle rotation flap over left groin vascular graft per Dr. Smiley  6/28 Debridement right foot per Dr. Kirkland in Podiatry Clinic and dressed with Iodosorb-referral to outpatient wound clinic  Patient has not walked since March; wheelchair bound; unable to extend left leg completely- contracted  Assessment 7/11- No sacral skin breakdown  8/2 7:15 pm 4 Eyes Skin Assessment- Previous Pressure injury documented   Assessment:  Patient awaiting transport to inpatient hospice  In bed on right side with legs flexed.  Right TMA- open to air with small dehiscence. Scant serous drainage.  Right heel- wound healed with new epithelium  Left heel- Unstageable pressure injury with 7 cm circular soft eschar; dry  Coccyx/buttocks- tissue mushy with dark discoloration; unstageable pressure injury involving gluteal cleft and extending to buttocks  Left groin- remains healed  Left knee-  large area of eschar, ischemic wound  Treatment:  Recommend keeping areas of necrosis/ischemia clean and dry. Continue Pressure Injury Prevention and moisture management.     08/03/2023

## 2023-08-03 NOTE — ASSESSMENT & PLAN NOTE
- Pt's  would prefer to focus on patient's comfort at this time, which is reasonable given her multiorgan failure and lack of candidacy for hemodialysis   - Start IV hydromorphone 0.5 mg Q1H PRN nonverbal signs of dyspnea or pain  - Start IV lorazepam 1 mg Q4H PRN anxiety, agitation  - Will continue IV pressors to help facilitate inpatient hospice transfer, as well as vapotherm

## 2023-08-03 NOTE — HOSPITAL COURSE
patient with a past medical history of HFpEF (EF: 75%), severe PAD , hypertension, type 2 diabetes, history of DVT,was on hospice at home duo to debility,has DNR status, who presents with altered mental status,patient was in septic shock duo to aspiration pneumonia,.,was on IV Abx,levophed,cultures are pending,was on gentle IVF for ARF,has respiratory failure,likely aspiration,had NPO status,was on aspiration precaution,consulted ST,will be closely be monied in ICU.on puree diet,  ARF was  worsening,consulted nephrology,not candidate for HD, and family updated,consulted palliative care, is agree with inpatient Hospice,patient as discharged to inpatient hospice.

## 2023-08-03 NOTE — PROGRESS NOTES
[facility] Medicine  Progress Note    Patient Name: Nelsy Marino  MRN: [id number]  Patient Class: IP- Inpatient   Admission Date: 8/1/2023  Length of Stay: 2 days  Attending Physician: Jerilyn Chairez, *  Primary Care Provider: Juan F Garay -        Subjective:     Principal Problem:Septic shock        HPI:  This is an 82-year-old female with a past medical history of HFpEF (EF: 75%), severe PAD , hypertension, type 2 diabetes, history of DVT who presents with altered mental status.    Patient initially presented to the ED on 8/1 for altered mental status. Upon EMS arrival, patient was noted to be hypotensive and had pinpoint pupils and was given narcan and fluids. Reportedly, she has been more confused and less active. Associated symptoms include decreased po intake. Patient was noted to be hypotensive and was given fluids, and ceftriaxone. Her BP did not respond to fluids, however, family did not want pressors and wished to proceed with hospice care. Social work was consulted and patient was discharged to home with hospice. However, patient's  declined equipment delivery and wanted home health only, so patient was not admitted to hospice. Patient was brought back to the ER later the same day for further management/evaluation as their wishes have changed to include treatment/pressors.     In the ED, the patient was bradycardic, tachypneic, hypoxic (requiring 6L O2) and hypotension (requiring Levophed). Labs were remarkable for an elevated creatinine (3.8 - baseline of 1.3), elevated BNP (2091), elevated troponin (0.124 < 0.113), elevated procalcitonin (1.11). ABG showed a PH of 7.315, PCO2 of 48.8. UA showed +3 leukocytes, >100 WBCs, 41 RBCs and occasional bacteria. CXR showed pulmonary vascular congestion, pulmonary edema and small bilateral pleural effusion. She was given 1L of NS, vancomycin, cefepime and was started on levophed. A central line was placed. Patient was  admitted for further management.         Overview/Hospital Course:  patient with a past medical history of HFpEF (EF: 75%), severe PAD , hypertension, type 2 diabetes, history of DVT,was on hospice at home duo to debility,has DNR status, who presents with altered mental status,patient is in septic shock duo to aspiration pneumonia,.,on IV Abx,levophed,cultures are pending,on gentle IVF for ARF,has respiratory failure,likely aspiration,had NPO status,on aspiration precaution,consulted ST,will be closely be monied in ICU.on puree diet,  ARF is worsening,consulted nephrology,not candidate for HD, and family updated,consulted palliative.      Past Medical History:   Diagnosis Date    Amputation of toes     CHF (congestive heart failure)     Diabetes mellitus     DVT (deep venous thrombosis)     Hypertension     Miscarriage     On home oxygen therapy     PRN       Past Surgical History:   Procedure Laterality Date     femoral vascular surgery       ANGIOGRAPHY OF LOWER EXTREMITY Right 2/23/2023    Procedure: Angiogram Extremity Unilateral;  Surgeon: Danny Dunbar MD;  Location: [facility] OR;  Service: Vascular;  Laterality: Right;  RN PHONE PREOP 2/20/2023----CK CONSENT    ANGIOGRAPHY OF LOWER EXTREMITY Right 4/4/2023    Procedure: Angiogram Extremity Unilateral - possible endovascular intervention;  Surgeon: René Simms MD;  Location: [facility] OR [address] [place] [address];  Service: Vascular;  Laterality: Right;  19.6 mins.  740.81 mGy  73.3399 Gy.cm  58ml dye  local - 1ml    COLONOSCOPY N/A 5/18/2023    Procedure: COLONOSCOPY;  Surgeon: Cuong Aguiar MD;  Location: [facility] ENDO;  Service: Endoscopy;  Laterality: N/A;    CREATION OF FEMORAL-FEMORAL ARTERY BYPASS WITH GRAFT Bilateral 3/2/2023    Procedure: CREATION, BYPASS, ARTERIAL, FEMORAL TO FEMORAL, USING GRAFT, LEFT FEMORAL ENDARTERECTOMY, LEFT ILIAC ARTERY STENT PLACEMENT, RIGHT LOWER EXTREMITY ANGIOGRAM AND RIGHT SFA  ANGIOPLASTY;  Surgeon: Danny CAGE  MD Bettina;  Location: [facility] OR 2ND FLR;  Service: Vascular;  Laterality: Bilateral;   744.47 MGY  175.36 Gycm  Flouro time 20.5 mins      CREATION OF ILIOFEMORAL ARTERY BYPASS Right 3/20/2019    Procedure: CREATION, BYPASS, ARTERIAL, ILIAC TO FEMORAL, RIGHT LOWER EXTREMITY, RIGHT PROFUNDAPLASTY;  Surgeon: Danny Dunbar MD;  Location: [facility] OR;  Service: Vascular;  Laterality: Right;  11:00AM START PER NAZANIN RICHARDS PREOP 3/13/2019  ACT'S, CELL SAVER, GRAFTS, BOOK WATER---NEED H/P  CONSENT IN AM------HGBA1C    CREATION OF MUSCLE ROTATIONAL FLAP Left 3/31/2023    Procedure: CREATION, FLAP, MUSCLE ROTATION;  Surgeon: Brandon Smiley MD;  Location: [facility] OR 2ND FLR;  Service: Plastics;  Laterality: Left;    ENDOSCOPY OF PROXIMAL SMALL INTESTINE N/A 5/18/2023    Procedure: ENTEROSCOPY, PROXIMAL;  Surgeon: Cuong Aguiar MD;  Location: [facility];  Service: Endoscopy;  Laterality: N/A;    ESOPHAGOGASTRODUODENOSCOPY N/A 3/17/2023    Procedure: EGD (ESOPHAGOGASTRODUODENOSCOPY);  Surgeon: Gee Rodriguez MD;  [facility]  Service: Endoscopy;  Laterality: N/A;    ESOPHAGOGASTRODUODENOSCOPY N/A 4/11/2023    Procedure: EGD (ESOPHAGOGASTRODUODENOSCOPY);  Surgeon: Nazanin Kaiser MD;  [facility]  Service: Endoscopy;  Laterality: N/A;    ESOPHAGOGASTRODUODENOSCOPY N/A 4/26/2023    Procedure: EGD (ESOPHAGOGASTRODUODENOSCOPY);  Surgeon: Gee Rodriguez MD;  Location: [facility] ENDO ([facility] FLR);  Service: Endoscopy;  Laterality: N/A;    ESOPHAGOGASTRODUODENOSCOPY N/A 5/2/2023    Procedure: EGD (ESOPHAGOGASTRODUODENOSCOPY);  Surgeon: Cuong Conteh MD;  [facility]  Service: Endoscopy;  Laterality: N/A;    FOOT AMPUTATION THROUGH METATARSAL Right 3/4/2023    Procedure: AMPUTATION, FOOT, TRANSMETATARSAL;  Surgeon: Benjamin Godfrey DPM;  Location: [facility] OR [address] [place] [address];  Service: Podiatry;  Laterality: Right;    HYSTERECTOMY      ?unsure cervix present  "   INCISION AND DRAINAGE Right 4/12/2019    Procedure: Incision and Drainage - R groin washout, possible muscle flap;  Surgeon: Danny Dunbar MD;  [facility];  Service: Vascular;  Laterality: Right;  groin washout    INCISION AND DRAINAGE OF GROIN Right 5/3/2019    Procedure: Incision and Drainage R groin;  Surgeon: Danny Dunbar MD;  [facility];  Service: Vascular;  Laterality: Right;    WOUND DEBRIDEMENT Left 3/31/2023    Procedure: DEBRIDEMENT, WOUND;  Surgeon: René Simms MD;  [facility];  Service: Vascular;  Laterality: Left;       Review of patient's allergies indicates:   Allergen Reactions    Motrin [ibuprofen] Itching       No current facility-administered medications on file prior to encounter.     Current Outpatient Medications on File Prior to Encounter   Medication Sig    acetaminophen (TYLENOL) 500 MG tablet Take 2 tablets (1,000 mg total) by mouth every 8 (eight) hours. (Patient not taking: Reported on 7/20/2023)    amLODIPine (NORVASC) 10 MG tablet Take 1 tablet (10 mg total) by mouth once daily.    atorvastatin (LIPITOR) 40 MG tablet Take 1 tablet (40 mg total) by mouth every evening.    carvediloL (COREG) 25 MG tablet Take 1 tablet (25 mg total) by mouth 2 (two) times daily.    cephALEXin (KEFLEX) 500 MG capsule Take 1 capsule (500 mg total) by mouth every 12 (twelve) hours.    cephALEXin (KEFLEX) 500 MG capsule Take 1 capsule (500 mg total) by mouth 4 (four) times daily. for 5 days    diphenhydrAMINE (BENADRYL) 25 mg capsule Take 25 mg by mouth daily as needed for Itching.    fluconazole (DIFLUCAN) 200 MG Tab Take 2 tablets (400 mg total) by mouth once daily.    furosemide (LASIX) 40 MG tablet Take 1 tablet (40 mg total) by mouth once daily.    insulin detemir U-100, Levemir, 100 unit/mL (3 mL) SubQ InPn pen Inject 5 Units into the skin every evening.    insulin syringe-needle,dispos. 0.3 mL 30 gauge x 5/16" Syrg by " "Misc.(Non-Drug; Combo Route) route.    ondansetron (ZOFRAN) 8 MG tablet Take by mouth every 8 (eight) hours as needed for Nausea.    oxyCODONE-acetaminophen (PERCOCET) 5-325 mg per tablet Take 1 tablet by mouth every 8 (eight) hours as needed for Pain.    pantoprazole (PROTONIX) 40 MG tablet Take 1 tablet (40 mg total) by mouth once daily.    pen needle, diabetic 32 gauge x 5/32" Ndle Inject 1 each into the skin 4 (four) times daily.    senna (SENOKOT) 8.6 mg tablet Take 1 tablet by mouth once daily.     Family History       Problem Relation (Age of Onset)    Diabetes Father    Hypertension Father          Tobacco Use    Smoking status: Former     Current packs/day: 0.00     Types: Cigarettes     Quit date:      Years since quittin.6    Smokeless tobacco: Never   Substance and Sexual Activity    Alcohol use: No    Drug use: No    Sexual activity: Not Currently     Partners: Male     Review of Systems   Unable to perform ROS: Mental status change     Objective:     Vital Signs (Most Recent):  Temp: 97.8 °F (36.6 °C) (23 1100)  Pulse: (!) 54 (23 1241)  Resp: (!) 22 (23 1241)  BP: (!) 102/51 (23 1230)  SpO2: (!) 92 % (23 1241) Vital Signs (24h Range):  Temp:  [97.8 °F (36.6 °C)-98.4 °F (36.9 °C)] 97.8 °F (36.6 °C)  Pulse:  [41-64] 54  Resp:  [10-54] 22  SpO2:  [73 %-100 %] 92 %  BP: ()/(39-62) 102/51     Weight: 55.8 kg (123 lb)  Body mass index is 22.5 kg/m².     Physical Exam  Vitals and nursing note reviewed.   Constitutional:       General: She is in acute distress.      Appearance: She is ill-appearing.   HENT:      Head: Normocephalic and atraumatic.      Nose: Nose normal.      Mouth/Throat:      Mouth: Mucous membranes are moist.   Eyes:      Extraocular Movements: Extraocular movements intact.   Cardiovascular:      Rate and Rhythm: Normal rate.      Pulses: Normal pulses.      Heart sounds: No murmur heard.  Pulmonary:      Effort: Pulmonary effort is " normal. No respiratory distress.   Abdominal:      General: Abdomen is flat.      Palpations: Abdomen is soft.      Tenderness: There is no abdominal tenderness.   Musculoskeletal:      Right lower leg: No edema.      Left lower leg: No edema.   Skin:     General: Skin is warm.      Capillary Refill: Capillary refill takes less than 2 seconds.   Neurological:      Mental Status: She is alert.      Comments: Alert, answers simples questions, follows commands                 Significant Labs: All pertinent labs within the past 24 hours have been reviewed.    Significant Imaging: I have reviewed all pertinent imaging results/findings within the past 24 hours.      Assessment/Plan:      * Septic shock  This patient does have evidence of infective focus  My overall impression is sepsis.  Source: Urinary Tract and Skin and Soft Tissue (location foot)  Antibiotics given-   Antibiotics (72h ago, onward)    Start     Stop Route Frequency Ordered    08/02/23 2300  ceFEPIme (MAXIPIME) 2 g in dextrose 5 % in water (D5W) 100 mL IVPB (MB+)         -- IV Every 24 hours (non-standard times) 08/02/23 0023    08/02/23 0900  mupirocin 2 % ointment         08/07/23 0859 Nasl 2 times daily 08/02/23 0009    08/01/23 2318  vancomycin - pharmacy to dose  (vancomycin IVPB (PEDS and ADULTS))        See Hyperspace for full Linked Orders Report.    -- IV pharmacy to manage frequency 08/01/23 2318        Latest lactate reviewed-  Recent Labs   Lab 08/01/23  0920 08/01/23  2129   LACTATE  --  2.2   POCLAC 1.33  --      Organ dysfunction indicated by Acute kidney injury, Acute respiratory failure and Encephalopathy    Fluid challenge Ideal Body Weight- The patient's ideal body weight is Ideal body weight: 50.1 kg (110 lb 7.2 oz) which will be used to calculate fluid bolus of 30 ml/kg for treatment of septic shock.      Post- resuscitation assessment Yes Perfusion exam was performed within 6 hours of septic shock presentation after bolus shows  Adequate tissue perfusion assessed by non-invasive monitoring     Duo to aspiration pneumonia.  Will Start Pressors- Levophed for MAP of 65  Source control achieved by:   Antibiotics  F/U cultures        Acute respiratory failure with hypoxia  Patient with Hypoxic Respiratory failure which is Acute.  she is not on home oxygen. Supplemental oxygen was provided and noted- Oxygen Concentration (%):  [] 90    .   Signs/symptoms of respiratory failure include- tachypnea, increased work of breathing and respiratory distress. Contributing diagnoses includes - Pneumonia Labs and images were reviewed. Patient Has not had a recent ABG. Will treat underlying causes and adjust management of respiratory failure as follows-   On HFO.    Pneumonia due to infectious organism  Duo to aspiration,pn brood spectrum IV Abx,      Acute metabolic encephalopathy  Likely in the setting of septic shock. No focal deficits on exam appreciated.    Monitor mental status    ACP (advance care planning)  Per discussion with ED provider & ; patient is DNR.     Acute on chronic respiratory failure with hypoxia  Patient with Hypoxic Respiratory failure which is Acute on chronic.  she is not on home oxygen. Supplemental oxygen was provided and noted- Oxygen Concentration (%):  [100] 100.  In the setting of shock, wean O2 as tolerated   Diurese when able.    Chronic diastolic heart failure  Results for orders placed during the hospital encounter of 07/10/23    Echo Saline Bubble? No    Interpretation Summary  · The left ventricle is normal in size with concentric hypertrophy and hyperdynamic systolic function.  · The estimated ejection fraction is 75%.  · Normal right ventricular size with normal right ventricular systolic function.  · Moderate left atrial enlargement.  · Moderate mitral regurgitation.  · Moderate to severe tricuspid regurgitation.  · Intermediate central venous pressure (8 mmHg).  · The estimated PA systolic pressure is 49  mmHg.  · There is pulmonary hypertension.  No acute issues      LOLY (acute kidney injury)  Patient with acute kidney injury/acute renal failure likely due to acute tubular necrosis caused by prolonged hypotension LOLY is currently worsening. Baseline creatinine 1.0 - Labs reviewed- Renal function/electrolytes with Estimated Creatinine Clearance: 8.4 mL/min (A) (based on SCr of 4.1 mg/dL (H)). according to latest data. Monitor urine output and serial BMP and adjust therapy as needed. Avoid nephrotoxins and renally dose meds for GFR listed above.    ARF is worsening,consulted nephrology,not candidate for HD, and family updated,consulted palliative.    Diabetic foot infection  Concern for foot infection. See plan for septic shock.     Type 2 diabetes mellitus, with long-term current use of insulin  Patient's FSGs are controlled on current medication regimen.  Last A1c reviewed-   Lab Results   Component Value Date    HGBA1C 6.5 (H) 06/20/2023     Most recent fingerstick glucose reviewed-   Recent Labs   Lab 08/01/23 2124   POCTGLUCOSE 99     Current correctional scale  Low  Maintain anti-hyperglycemic dose as follows-   Antihyperglycemics (From admission, onward)    Start     Stop Route Frequency Ordered    08/02/23 0145  insulin aspart U-100 pen 0-5 Units         -- SubQ Every 6 hours PRN 08/02/23 0045        Hold Oral hypoglycemics while patient is in the hospital.    Essential hypertension  Hold home medications in the setting of hypotension.       PAD (peripheral artery disease)  Did not tolerate aspirin/plavix due to bleeding.   Continue statin        VTE Risk Mitigation (From admission, onward)         Ordered     heparin (porcine) injection 5,000 Units  Every 8 hours         08/02/23 0018     IP VTE HIGH RISK PATIENT  Once         08/02/23 0018     Place sequential compression device  Until discontinued         08/02/23 0018                Discharge Planning   JUAN ALBERTO:      Code Status: DNR   Is the patient  medically ready for discharge?:     Reason for patient still in hospital (select all that apply): Patient trending condition  Discharge Plan A: Inpatient Hospice   Discharge Delays: (!) Post-Acute Set-up        Critical care time spent on the evaluation and treatment of severe organ dysfunction, review of pertinent labs and imaging studies, discussions with consulting providers and discussions with patient/family:  Over 45  minutes.      Jerilyn Chairez MD  Department of Hospital Medicine   [facility] - Intensive Care

## 2023-08-03 NOTE — DISCHARGE SUMMARY
[facility] Medicine  Discharge Summary      Patient Name: Nelsy Marino  MRN: [id number]  NICK: [id number]  Patient Class: IP- Inpatient  Admission Date: 8/1/2023  Hospital Length of Stay: 2 days  Discharge Date and Time:  08/03/2023 2:20 PM  Attending Physician: Jerilyn Chairez, *   Discharging Provider: Jerilyn Chairez MD  Primary Care Provider: Juan F Garay -    Primary Care Team: Networked reference to record PCT     HPI:   This is an 82-year-old female with a past medical history of HFpEF (EF: 75%), severe PAD , hypertension, type 2 diabetes, history of DVT who presents with altered mental status.    Patient initially presented to the ED on 8/1 for altered mental status. Upon EMS arrival, patient was noted to be hypotensive and had pinpoint pupils and was given narcan and fluids. Reportedly, she has been more confused and less active. Associated symptoms include decreased po intake. Patient was noted to be hypotensive and was given fluids, and ceftriaxone. Her BP did not respond to fluids, however, family did not want pressors and wished to proceed with hospice care. Social work was consulted and patient was discharged to home with hospice. However, patient's  declined equipment delivery and wanted home health only, so patient was not admitted to hospice. Patient was brought back to the ER later the same day for further management/evaluation as their wishes have changed to include treatment/pressors.     In the ED, the patient was bradycardic, tachypneic, hypoxic (requiring 6L O2) and hypotension (requiring Levophed). Labs were remarkable for an elevated creatinine (3.8 - baseline of 1.3), elevated BNP (2091), elevated troponin (0.124 < 0.113), elevated procalcitonin (1.11). ABG showed a PH of 7.315, PCO2 of 48.8. UA showed +3 leukocytes, >100 WBCs, 41 RBCs and occasional bacteria. CXR showed pulmonary vascular congestion, pulmonary edema and small bilateral  pleural effusion. She was given 1L of NS, vancomycin, cefepime and was started on levophed. A central line was placed. Patient was admitted for further management.         * No surgery found *      Hospital Course:   patient with a past medical history of HFpEF (EF: 75%), severe PAD , hypertension, type 2 diabetes, history of DVT,was on hospice at home duo to debility,has DNR status, who presents with altered mental status,patient was in septic shock duo to aspiration pneumonia,.,was on IV Abx,levophed,cultures are pending,was on gentle IVF for ARF,has respiratory failure,likely aspiration,had NPO status,was on aspiration precaution,consulted ST,will be closely be monied in ICU.on puree diet,  ARF was  worsening,consulted nephrology,not candidate for HD, and family updated,consulted palliative care, is agree with inpatient Hospice,patient as discharged to inpatient hospice.       Goals of Care Treatment Preferences:  Code Status: DNR           Accordingly, we have decided that the best plan to meet the patient's goals includes continuing with treatment.      Consults:   Consults (From admission, onward)        Status Ordering Provider     Inpatient consult to Nephrology  Once        Provider:  Tiffany Murillo MD    Completed AKSHORTYZADEKATT FOXZIM     Inpatient consult to Palliative Care  Once        Provider:  (Not yet assigned)    Completed AKAARONNDZADEDOMINIQUE ABDOLAZIM     Inpatient consult to Spiritual Care  Once        Provider:  (Not yet assigned)    Completed AKAARONNDZADEDOMINIQUE ABDOLAZIM     IP consult to case management  Once        Provider:  (Not yet assigned)    Completed AKHONDZADEH ABDOLAZIM     Inpatient consult to Registered Dietitian/Nutritionist  Once        Provider:  (Not yet assigned)    Completed AKAARONNDZADEH ABDOLAZIM     Pharmacy to dose Vancomycin consult  Once        Provider:  (Not yet assigned)   See Mark for full Linked Orders Report.    Acknowledged PARK [facility]IGGS  Assessment & Plan notes have been filed under this hospital service since the last note was generated.  Service: Hospital Medicine    Final Active Diagnoses:    Diagnosis Date Noted POA    PRINCIPAL PROBLEM:  Septic shock [A41.9, R65.21] 02/27/2023 Yes    Pneumonia due to infectious organism [J18.9] 08/03/2023 Yes    Acute respiratory failure with hypoxia [J96.01] 08/03/2023 Yes    End of life care [Z51.5] 08/03/2023 Not Applicable    ACP (advance care planning) [Z71.89] 07/11/2023 Not Applicable    Acute metabolic encephalopathy [G93.41] 07/11/2023 Yes    Acute on chronic respiratory failure with hypoxia [J96.21] 06/20/2023 Yes    Chronic diastolic heart failure [I50.32] 06/20/2023 Yes    Vitamin D deficiency [E55.9] 05/16/2023 Yes    Moderate malnutrition [E44.0] 04/28/2023 Yes    LOLY (acute kidney injury) [N17.9] 04/21/2023 Yes    Diabetic foot infection [E11.628, L08.9] 03/16/2023 Yes    Essential hypertension [I10] 03/20/2019 Yes     Chronic    PAD (peripheral artery disease) [I73.9] 03/20/2019 Yes     Chronic    Type 2 diabetes mellitus, with long-term current use of insulin [E11.9, Z79.4] 03/20/2019 Not Applicable    Diastolic CHF, chronic [I50.32] 03/20/2019 Yes     Chronic      Problems Resolved During this Admission:       Discharged Condition: stable    Disposition: Hospice/Medical Facility    Follow Up:    Patient Instructions:      Activity as tolerated       Significant Diagnostic Studies: Labs:   BMP:   Recent Labs   Lab 08/01/23 2129 08/02/23  0531 08/03/23  0006   GLU 79 136* 156*    141 140   K 3.6 3.4* 5.1    107 107   CO2 24 23 19*   BUN 56* 54* 62*   CREATININE 3.8* 3.6* 4.1*   CALCIUM 7.7* 7.4* 7.2*   MG 2.1 2.0  --    , CMP   Recent Labs   Lab 08/01/23 2129 08/02/23  0531 08/03/23  0006    141 140   K 3.6 3.4* 5.1    107 107   CO2 24 23 19*   GLU 79 136* 156*   BUN 56* 54* 62*   CREATININE 3.8* 3.6* 4.1*   CALCIUM 7.7* 7.4* 7.2*   PROT 5.9* 5.9* 5.7*  "  ALBUMIN 1.7* 1.7* 1.6*   BILITOT 0.3 0.3 0.3   ALKPHOS 114 116 123   AST 46* 51* 48*   ALT 11 14 13   ANIONGAP 13 11 14    and CBC   Recent Labs   Lab 08/01/23  2129 08/01/23  2151 08/02/23  0531 08/03/23  0006   WBC 5.61  --  7.41 8.48   HGB 7.5*  --  7.6* 7.3*   HCT 24.6*   < > 25.4* 23.9*     --  524* 550*    < > = values in this interval not displayed.     Microbiology:   Blood Culture   Lab Results   Component Value Date    LABBLOO No Growth to date 08/01/2023    LABBLOO No Growth to date 08/01/2023    and Urine Culture    Lab Results   Component Value Date    LABURIN Insufficient incubation, culture in progress 08/01/2023     Radiology: X-Ray: CXR: X-Ray Chest 1 View (CXR):   Results for orders placed or performed during the hospital encounter of 08/01/23   X-Ray Chest 1 View    Narrative    EXAMINATION:  XR CHEST 1 VIEW    CLINICAL HISTORY:  decreased o2 sat;    FINDINGS:  Chest one view: Central line tip lower SVC.  There is cardiomegaly, moderate edema, and small pleural effusions.      Impression    Pulmonary edema pneumonia aspiration or sepsis worse from the prior study.      Electronically signed by: Stefano Quinteros MD  Date:    08/02/2023  Time:    11:56    and X-Ray Chest PA and Lateral (CXR): No results found for this visit on 08/01/23.    Pending Diagnostic Studies:     None         Medications:  Reconciled Home Medications:      Medication List      STOP taking these medications    acetaminophen 500 MG tablet  Commonly known as: TYLENOL     amLODIPine 10 MG tablet  Commonly known as: NORVASC     atorvastatin 40 MG tablet  Commonly known as: LIPITOR     BD SHERINE 2ND GEN PEN NEEDLE 32 gauge x 5/32" Ndle  Generic drug: pen needle, diabetic     carvediloL 25 MG tablet  Commonly known as: COREG     cephALEXin 500 MG capsule  Commonly known as: KEFLEX     diphenhydrAMINE 25 mg capsule  Commonly known as: BENADRYL     fluconazole 200 MG Tab  Commonly known as: DIFLUCAN     furosemide 40 MG " "tablet  Commonly known as: LASIX     insulin syringe-needle,dispos. 0.3 mL 30 gauge x 5/16" Syrg     LEVEMIR FLEXPEN 100 unit/mL (3 mL) Inpn pen  Generic drug: insulin detemir U-100 (Levemir)     ondansetron 8 MG tablet  Commonly known as: ZOFRAN     oxyCODONE-acetaminophen 5-325 mg per tablet  Commonly known as: PERCOCET     pantoprazole 40 MG tablet  Commonly known as: PROTONIX     senna 8.6 mg tablet  Commonly known as: SENOKOT            Indwelling Lines/Drains at time of discharge:   Lines/Drains/Airways     Central Venous Catheter Line  Duration           Percutaneous Central Line Insertion/Assessment - Triple Lumen  08/02/23 0127 Internal Jugular Right 1 day          Drain  Duration                Urethral Catheter 08/01/23 2245 Straight-tip 16 Fr. 1 day                Time spent on the discharge of patient:  Over 45  minutes    Critical care time spent on the evaluation and treatment of severe organ dysfunction, review of pertinent labs and imaging studies, discussions with consulting providers and discussions with patient/family: over 45  minutes.     Jerilyn Chairez MD  Department of Hospital Medicine  [facility] - Intensive Care  "

## 2023-08-03 NOTE — PLAN OF CARE
[facility]  Department of Hospital Medicine  [address] [place] [address]  [phone] after hours  [phone] fax    HOSPICE  ORDERS    08/03/2023    Admit to Hospice: Inpatient Service     Diagnoses:   Active Hospital Problems    Diagnosis  POA    *Septic shock [A41.9, R65.21]  Yes    Pneumonia due to infectious organism [J18.9]  Yes    Acute respiratory failure with hypoxia [J96.01]  Yes    End of life care [Z51.5]  Not Applicable    ACP (advance care planning) [Z71.89]  Not Applicable     Discussion between Dr montejo and spouse      Acute metabolic encephalopathy [G93.41]  Yes    Acute on chronic respiratory failure with hypoxia [J96.21]  Yes    Chronic diastolic heart failure [I50.32]  Yes    Vitamin D deficiency [E55.9]  Yes    Moderate malnutrition [E44.0]  Yes    LOLY (acute kidney injury) [N17.9]  Yes    Diabetic foot infection [E11.628, L08.9]  Yes    Essential hypertension [I10]  Yes     Chronic    PAD (peripheral artery disease) [I73.9]  Yes     Chronic     Patient did not tolerate aspirin or plavix due to GI bleeding      Type 2 diabetes mellitus, with long-term current use of insulin [E11.9, Z79.4]  Not Applicable    Diastolic CHF, chronic [I50.32]  Yes     Chronic      Resolved Hospital Problems   No resolved problems to display.       Hospice Qualifying Diagnoses: Acute renal failure,septic shock,aspiration      Less than 6 months life expectancy     Vital Signs: Routine per Hospice Protocol.    Code Status: DNR     Allergies:   Review of patient's allergies indicates:   Allergen Reactions    Motrin [ibuprofen] Itching       Diet: puree,thin,aspiration precaution     Activities: As tolerated    Goals of Care Treatment Preferences:  Code Status: DNR             Nursing: Per Hospice Routine.         Routine Skin for Bedridden Patients: Apply moisture barrier cream to all skin folds and   wet areas in perineal area daily and after baths and all bowel  "movements.        Oxygen: 35 liter at 90 % FIO2    Other Miscellaneous Care: aspiration precaution,fall precaution         Medications:          Medication List        CHANGE how you take these medications      .            STOP taking these medications      acetaminophen 500 MG tablet  Commonly known as: TYLENOL     amLODIPine 10 MG tablet  Commonly known as: NORVASC     atorvastatin 40 MG tablet  Commonly known as: LIPITOR     BD SHERINE 2ND GEN PEN NEEDLE 32 gauge x 5/32" Ndle  Generic drug: pen needle, diabetic     carvediloL 25 MG tablet  Commonly known as: COREG     diphenhydrAMINE 25 mg capsule  Commonly known as: BENADRYL     fluconazole 200 MG Tab  Commonly known as: DIFLUCAN     furosemide 40 MG tablet  Commonly known as: LASIX     insulin syringe-needle,dispos. 0.3 mL 30 gauge x 5/16" Syrg     LEVEMIR FLEXPEN 100 unit/mL (3 mL) Inpn pen  Generic drug: insulin detemir U-100 (Levemir)     ondansetron 8 MG tablet  Commonly known as: ZOFRAN     oxyCODONE-acetaminophen 5-325 mg per tablet  Commonly known as: PERCOCET     pantoprazole 40 MG tablet  Commonly known as: PROTONIX     senna 8.6 mg tablet  Commonly known as: SENOKOT                    Future Orders:  Hospice Medical Director may dictate new orders for comfortable care measures & sign death certificate.        _________________________________  Jerilyn Chairez MD  08/03/2023     "

## 2023-08-04 ENCOUNTER — PATIENT OUTREACH (OUTPATIENT)
Dept: ADMINISTRATIVE | Facility: OTHER | Age: 83
End: 2023-08-04
Payer: MEDICARE

## 2023-08-04 LAB — BACTERIA UR CULT: NORMAL

## 2023-08-04 NOTE — NURSING
[facility], [facility]  Nurses Note -- 4 Eyes      7/12/2023       Skin assessed on: Q Shift      [x] No Pressure Injuries Present    [x]Prevention Measures Documented    [] Yes LDA  for Pressure Injury Previously documented     [] Yes New Pressure Injury Discovered   [] LDA for New Pressure Injury Added      Attending RN:  Peggy Thomas RN     Second RN:  Valentina RICHARDS         Derrek Kelly  Orthopaedic Surgery  [address], [address]  [place] [address]  Phone: [phone]  Fax: [phone]  Follow Up Time:

## 2023-08-05 LAB
BACTERIA BLD CULT: NORMAL
BACTERIA BLD CULT: NORMAL

## 2023-08-18 NOTE — SUBJECTIVE & OBJECTIVE
Past Medical History:   Diagnosis Date    CHF (congestive heart failure)     Diabetes mellitus     DVT (deep venous thrombosis)     Hypertension     Miscarriage        Past Surgical History:   Procedure Laterality Date     femoral vascular surgery       ANGIOGRAPHY OF LOWER EXTREMITY Right 2/23/2023    Procedure: Angiogram Extremity Unilateral;  Surgeon: Danny Dunbar MD;  Location: [facility];  Service: Vascular;  Laterality: Right;  RN PHONE PREOP 2/20/2023----CK CONSENT    CREATION OF ILIOFEMORAL ARTERY BYPASS Right 3/20/2019    Procedure: CREATION, BYPASS, ARTERIAL, ILIAC TO FEMORAL, RIGHT LOWER EXTREMITY, RIGHT PROFUNDAPLASTY;  Surgeon: Danny Dunbar MD;  Location: [facility] OR;  Service: Vascular;  Laterality: Right;  11:00AM START PER ANNE  RN PREOP 3/13/2019  ACT'S, CELL SAVER, GRAFTS, BOOK WATER---NEED H/P  CONSENT IN AM------HGBA1C    HYSTERECTOMY      ?unsure cervix present    INCISION AND DRAINAGE Right 4/12/2019    Procedure: Incision and Drainage - R groin washout, possible muscle flap;  Surgeon: Danny Dunbar MD;  Location: [facility] OR [address] [place] [address];  Service: Vascular;  Laterality: Right;  groin washout    INCISION AND DRAINAGE OF GROIN Right 5/3/2019    Procedure: Incision and Drainage R groin;  Surgeon: Danny Dunbar MD;  Location: [facility] OR [address] [place] [address];  Service: Vascular;  Laterality: Right;       Review of patient's allergies indicates:   Allergen Reactions    Motrin [ibuprofen] Itching    Zosyn [piperacillin-tazobactam] Itching       No current facility-administered medications on file prior to encounter.     Current Outpatient Medications on File Prior to Encounter   Medication Sig    amlodipine (NORVASC) 10 MG tablet Take 10 mg by mouth once daily.    aspirin (ECOTRIN) 81 MG EC tablet Take 81 mg by mouth once daily.    BLOOD SUGAR DIAGNOSTIC (TRUE METRIX GLUCOSE TEST STRIP MISC) by Misc.(Non-Drug; Combo Route) route.    carvediloL (COREG) 12.5 MG tablet Take 12.5 mg by mouth 2 (two)  "times daily.    cilostazol (PLETAL) 50 MG Tab Take 1 tablet (50 mg total) by mouth 2 (two) times daily. If patient tolerate medication after 4 weeks, increase dose to 100 mg twice daily.    glipiZIDE (GLUCOTROL) 10 MG tablet Take 10 mg by mouth 2 (two) times daily before meals.    insulin aspart protamine-insulin aspart (NOVOLOG 70/30) 100 unit/mL (70-30) InPn pen Inject 50 Units into the skin every morning.    insulin aspart protamine-insulin aspart (NOVOLOG 70/30) 100 unit/mL (70-30) InPn pen Inject 50 Units into the skin every evening.    insulin syringe-needle,dispos. 0.3 mL 30 gauge x 5/16" Syrg by Misc.(Non-Drug; Combo Route) route.    losartan-hydrochlorothiazide 100-25 mg (HYZAAR) 100-25 mg per tablet Take 1 tablet by mouth once daily.    metformin (GLUCOPHAGE) 1000 MG tablet Take 1,000 mg by mouth 2 (two) times daily with meals.    ondansetron (ZOFRAN) 8 MG tablet      Family History       Problem Relation (Age of Onset)    Diabetes Father    Hypertension Father          Tobacco Use    Smoking status: Former     Types: Cigarettes     Quit date:      Years since quittin.1    Smokeless tobacco: Never   Substance and Sexual Activity    Alcohol use: No    Drug use: No    Sexual activity: Never     Review of Systems   Constitutional: Negative for chills, diaphoresis, fever and malaise/fatigue.   HENT:  Negative for nosebleeds.    Eyes:  Negative for blurred vision and double vision.   Cardiovascular:  Positive for claudication. Negative for chest pain, cyanosis, dyspnea on exertion, leg swelling, orthopnea, palpitations, paroxysmal nocturnal dyspnea and syncope.   Respiratory:  Negative for cough, shortness of breath and wheezing.    Skin:  Negative for dry skin and poor wound healing.   Musculoskeletal:  Negative for back pain, joint swelling and myalgias.   Gastrointestinal:  Negative for abdominal pain, nausea and vomiting.   Genitourinary:  Negative for hematuria.   Neurological:  Negative for " dizziness, headaches, numbness, seizures and weakness.   Psychiatric/Behavioral:  Negative for altered mental status and depression.    Objective:     Vital Signs (Most Recent):  Temp: 98 °F (36.7 °C) (02/28/23 1119)  Pulse: 83 (02/28/23 1119)  Resp: 17 (02/28/23 1511)  BP: (!) 115/57 (02/28/23 1119)  SpO2: 96 % (02/28/23 1119)   Vital Signs (24h Range):  Temp:  [97.5 °F (36.4 °C)-98 °F (36.7 °C)] 98 °F (36.7 °C)  Pulse:  [] 83  Resp:  [17-22] 17  SpO2:  [95 %-98 %] 96 %  BP: (115-188)/(57-74) 115/57     Weight: 73.9 kg (163 lb)  Body mass index is 27.98 kg/m².    SpO2: 96 %         Intake/Output Summary (Last 24 hours) at 2/28/2023 1609  Last data filed at 2/28/2023 1445  Gross per 24 hour   Intake 120 ml   Output --   Net 120 ml       Lines/Drains/Airways       Peripheral Intravenous Line  Duration                  Peripheral IV - Single Lumen 02/27/23 1923 20 G Right Antecubital <1 day                    Physical Exam  Constitutional:       General: She is not in acute distress.     Appearance: Normal appearance. She is well-developed. She is not ill-appearing, toxic-appearing or diaphoretic.   HENT:      Head: Normocephalic and atraumatic.   Eyes:      General: No scleral icterus.     Extraocular Movements: Extraocular movements intact.      Conjunctiva/sclera: Conjunctivae normal.      Pupils: Pupils are equal, round, and reactive to light.   Neck:      Vascular: No JVD.      Trachea: No tracheal deviation.   Cardiovascular:      Rate and Rhythm: Normal rate and regular rhythm.      Heart sounds: S1 normal and S2 normal. No murmur heard.    No friction rub. No gallop.   Pulmonary:      Effort: Pulmonary effort is normal. No respiratory distress.      Breath sounds: Normal breath sounds. No stridor. No wheezing, rhonchi or rales.   Chest:      Chest wall: No tenderness.   Abdominal:      General: There is no distension.      Palpations: Abdomen is soft.   Musculoskeletal:         General: No swelling or  tenderness. Normal range of motion.      Cervical back: Normal range of motion and neck supple. No rigidity.      Right lower leg: No edema.      Left lower leg: No edema.      Comments: R foot wet gangrene of great and 2nd toes   Skin:     General: Skin is warm and dry.      Coloration: Skin is not jaundiced.   Neurological:      General: No focal deficit present.      Mental Status: She is alert and oriented to person, place, and time.      Cranial Nerves: No cranial nerve deficit.   Psychiatric:         Mood and Affect: Mood normal.         Behavior: Behavior normal.       Current Medications:   amLODIPine  10 mg Oral Daily    aspirin  81 mg Oral Daily    carvediloL  12.5 mg Oral BID    cilostazoL  50 mg Oral BID    heparin (porcine)  5,000 Units Subcutaneous Q8H    hydroCHLOROthiazide  25 mg Oral Daily    insulin aspart protamine-insulin aspart  50 Units Subcutaneous QAM    insulin aspart protamine-insulin aspart  25 Units Subcutaneous QHS    losartan  100 mg Oral Daily    piperacillin-tazobactam (ZOSYN) IVPB  4.5 g Intravenous Q8H    polyethylene glycol  17 g Oral Daily    vancomycin (VANCOCIN) IVPB  1,000 mg Intravenous Q24H       acetaminophen, acetaminophen, albuterol-ipratropium, aluminum-magnesium hydroxide-simethicone, bisacodyL, dextrose 10%, dextrose 10%, diphenhydrAMINE, glucagon (human recombinant), glucose, glucose, HYDROcodone-acetaminophen, insulin aspart U-100, magnesium oxide, magnesium oxide, melatonin, morphine, naloxone, ondansetron, potassium bicarbonate, potassium bicarbonate, potassium bicarbonate, potassium, sodium phosphates, potassium, sodium phosphates, potassium, sodium phosphates, prochlorperazine, simethicone, sodium chloride 0.9%, Pharmacy to dose Vancomycin consult **AND** vancomycin - pharmacy to dose    Laboratory (all labs reviewed):  CBC:  Recent Labs   Lab 02/15/23  1002 02/27/23  1427 02/28/23  0549   WBC 10.22 10.57 10.99   Hemoglobin 10.7 L 10.2 L 10.3 L   Hematocrit 35.0 L  33.4 L 33.5 L   Platelets 395 397 406       CHEMISTRIES:  Recent Labs   Lab 01/30/23  0708 02/15/23  1002 02/27/23  1427 02/28/23  0549   Glucose  --  157 H 138 H 49 LL   Sodium  --  142 139 139   Potassium  --  4.5 4.6 4.3   BUN  --  26 H 18 19   Creatinine 1.1 1.0 1.2 1.3   eGFR 50 A 56 A 45 A 41 A   Calcium  --  9.3 9.3 9.4   Magnesium  --  2.3  --  2.2       CARDIAC BIOMARKERS:        COAGS:  Recent Labs   Lab 02/15/23  1002 02/27/23  1427 02/28/23  0549   INR 1.1 1.1 1.1       LIPIDS/LFTS:  Recent Labs   Lab 02/27/23  1427 02/28/23  0549   AST 22 22   ALT 9 L 11       BNP:        TSH:        Free T4:        Diagnostic Results:  ECG (personally reviewed and interpreted tracing(s)):  2/27/23 1447 , IMI-age indet, NSSTTW changes    Echo 2/28/23 (images personally reviewed and interpreted)  The left ventricle is normal in size with mild concentric hypertrophy and normal systolic function.  The estimated ejection fraction is 65%.  Grade I left ventricular diastolic dysfunction.  Normal right ventricular size with normal right ventricular systolic function.  The estimated PA systolic pressure is 37 mmHg.       same name as above

## 2023-08-30 ENCOUNTER — DOCUMENT SCAN (OUTPATIENT)
Dept: HOME HEALTH SERVICES | Facility: HOSPITAL | Age: 83
End: 2023-08-30
Payer: MEDICARE

## 2023-11-06 PROBLEM — J96.01 ACUTE RESPIRATORY FAILURE WITH HYPOXIA: Status: RESOLVED | Noted: 2023-08-03 | Resolved: 2023-11-06

## 2023-11-06 PROBLEM — J96.21 ACUTE ON CHRONIC RESPIRATORY FAILURE WITH HYPOXIA: Status: RESOLVED | Noted: 2023-06-20 | Resolved: 2023-11-06

## 2023-11-06 PROBLEM — J18.9 PNEUMONIA DUE TO INFECTIOUS ORGANISM: Status: RESOLVED | Noted: 2023-08-03 | Resolved: 2023-11-06

## 2023-11-06 PROBLEM — R65.21 SEPTIC SHOCK: Status: RESOLVED | Noted: 2023-02-27 | Resolved: 2023-11-06

## 2023-11-06 PROBLEM — A41.9 SEPTIC SHOCK: Status: RESOLVED | Noted: 2023-02-27 | Resolved: 2023-11-06

## 2023-11-06 PROBLEM — N17.9 AKI (ACUTE KIDNEY INJURY): Status: RESOLVED | Noted: 2023-04-21 | Resolved: 2023-11-06

## 2024-01-23 NOTE — PATIENT INSTRUCTIONS
Understanding Peripheral Arterial Disease    Peripheral arteries deliver oxygen-rich blood to the tissues outside the heart. As you age, your arteries become stiffer and thicker. In addition, risk factors, such as smoking and high cholesterol, can damage the artery lining. This allows a buildup of fat and other materials (plaque) to form within the artery walls. The buildup of plaque narrows the space inside the artery and sometimes blocks blood flow. Peripheral arterial disease (PAD) happens when blood flow through the arteries is reduced because of plaque buildup. It often happens in the legs and feet, but can also happen elsewhere in the body. If this buildup happens in the a large artery in the neck (carotid artery), it can be a major contributor to stroke.  A healthy artery  An artery is a muscular tube that carries oxgen rich blood and nutrients from the heart to the rest of the body. It has a smooth lining and flexible walls that allow blood to pass freely. When active, muscles need more oxygen. This increases blood flow. Healthy arteries can adapt to meet this need.  A damaged artery    PAD begins when the lining of an artery is damaged. This is often because of risk factors, such as smoking, older age, or diabetes. Plaque then starts to form within the artery wall. At this stage, blood flows normally, so youre not likely to have symptoms.  A narrowed artery    If plaque continues to build up, the space inside the artery narrows. The artery walls become less able to expand. The artery still provides enough blood and oxygen to your muscles during rest. But when youre active, the increased demand for blood cant be met. As a result, your leg may cramp or ache when you walk.  A blocked artery    An artery can become blocked by plaque or by a blood clot lodged in a narrowed section. When this happens, oxygen cant reach the muscle below the blockage. Then you may feel pain when lying down or when you are not  ----- Message from Tara Durán MD sent at 1/23/2024  4:45 PM CST -----  Please mail nl pap letter. MAT   active (rest pain). This type of pain is especially common at night when youre lying flat. In time, the affected tissue can die. This can lead to the loss of a toe or foot.  Date Last Reviewed: 5/1/2016 © 2000-2017 [employer]. [address] [place] [id number]. All rights reserved. This information is not intended as a substitute for professional medical care. Always follow your healthcare professional's instructions.        Peripheral Artery Disease (PAD)     Peripheral artery disease (PAD) occurs when the arteries that carry blood to the limbs are narrowed or blocked. This is usually due to a buildup of a fatty substance called plaque in the walls of the arteries.  PAD most often affects the arteries in the legs. When these arteries are narrowed or blocked, blood flow to the legs is reduced. This can cause leg and foot pain and other symptoms. If severe enough, reduced blood flow to the legs can lead to tissue death (gangrene) and the loss of a toe, foot, or leg. Having PAD also makes it more likely that arteries in other body areas are blocked. For instance, arteries that carry blood to the heart or brain may be affected. This raises the chances of heart attack and stroke.  Risk factors  Certain factors can make PAD more likely. They include:  · Smoking  · Diabetes  · High blood pressure  · Unhealthy cholesterol levels  · Obesity  · Inactive lifestyle  · Older age  · Family history of PAD  Symptoms  Many people with PAD have no symptoms. If symptoms do occur, they can include:  · Pain in the muscles of the calves, thighs or hips that gets worse with activity and better with rest (intermittent claudication)  · Achy, tired, or heavy feeling in the legs  · Weakness, numbness, tingling, or loss of feeling in the legs  · Changes in skin color of the legs  · Sores on the legs and feet  · Cold leg, feet, or toes  · Pain the feet or toes even when lying down (rest pain)  Home care  PAD is a  chronic (lifelong) condition. Treatment is focused on managing your condition and lowering your health risks. This may include doing the following:  · If you smoke, quit. This helps prevent further damage to your arteries and lowers your health risks. Ask your provider about medicines or products that can help you quit smoking. Also consider joining a stop-smoking program or support group.  · Be more active. This helps you lose weight and manage problems such as high blood pressure and unhealthy cholesterol levels. Start a walking program if advised to by your provider. Your provider may also help you form a safe exercise program that is right for your needs.  · Make healthy eating changes. This includes eating less fat, salt, and sugar.  · Take medicines for high blood pressure, unhealthy cholesterol levels, and diabetes as directed.  · Have your blood pressure and cholesterol levels checked as often as directed.  · If you have diabetes, try to keep your blood sugar well controlled. Test your blood sugar as directed.  · If you are overweight, talk to your provider about forming a weight-loss plan.  · Watch for cuts, scrapes, or open sores on your feet. Poor blood flow to the feet may slow healing and increase the risk of infection from these problems.   Follow-up care  Follow up with your healthcare provider, or as advised. If imaging tests such as ultrasound were done, they will be reviewed by a doctor. You will be told the results and any new findings that may affect your care.  When to seek medical advice   Call your healthcare provider right away if any of these occur:  · Sudden severe pain in the legs or feet  · Sudden cold, pale or blue color in the legs or feet  · Weakness or numbness in the legs or feet that worsens  · Any sore or wound in the legs or feet that wont heal  · Weak pulse in your legs or feet  Know the Signs of Heart Attack and Stroke  People with PAD are at high risk for heart attack and  stroke. Knowing the signs of these problems can help you protect your health and get help when you need it. Call 911 right away if you have any of the following:  Signs of a Heart Attack  · Chest discomfort, such as pain, aching, tightness, or pressure that lasts more than a few minutes, or that comes and goes  · Pain or discomfort in the arms, back, shoulders, neck, or jaw  · Shortness of breath  · Sweating (often a cold, clammy sweat)  · Nausea  · Lightheadedness  Signs of a Stroke  · Sudden numbness or weakness of the face, arms, or legs, especially on one side  · Sudden confusion or trouble speaking or understanding  · Sudden trouble seeing in one or both eyes  · Sudden trouble walking, dizziness, or loss of balance  · Sudden, severe headache with no known cause   Date Last Reviewed: 9/21/2015  © 1322-9138 [employer]. [address] [place] [address]. All rights reserved. This information is not intended as a substitute for professional medical care. Always follow your healthcare professional's instructions.        A Walking Program for Peripheral Arterial Disease (PAD)  Peripheral arterial disease (PAD) is a condition where the arteries in the legs are severely damaged. When you have PAD, walking can be painful. So you may start to walk less. Walking less makes your leg muscles weaker. It then becomes harder and more painful to walk. A walking program can break this cycle. This sheet gives you tips on how to get started.     Walking farther without pain  Exercise strengthens leg muscles that are out of shape. It also trains these muscles to work with less oxygen. This helps your leg muscles work better even with reduced blood flow to your legs. When you have PAD, a walking program can be helpful. Your program can:  · Let you walk longer and farther without claudication. This is an ache in your legs during exercise that goes away with rest.  · Let you do more and be more active  · Add to  your overall health and well-being  · Help you control your blood sugar and blood pressure  · Help you become healthier with no risk and at little or no cost  Getting started  Your local hospital, vascular center, or cardiac rehab center may have a special walking program for people with PAD. If so, this is your best option. But if you cant find a program, or its not covered by insurance, you can still walk on your own. Follow these steps at each session:  · Step 1. Start at a pace that lets you walk for 5 to 10 minutes before you start to feel claudication. This feeling is unpleasant, but it doesnt hurt you. Keep going until the pain makes you feel that you need to stop.  · Step 2. Stop and rest for 3 to 5 minutes, just long enough for the pain to go away. You can rest standing or sitting. (Some people like to bring along a cane or a lightweight folding chair.)  · Step 3. Again walk at a pace that lets you walk for 5 to 10 minutes more before you feel pain. This may be slower than your starting pace in step 1. Then rest again.  · Step 4. Repeat this process until youve walked for 45 minutes. This should be about 60 to 80 minutes total, including resting time. You may not be able to do a full 45 minutes at first. Do as much as you can, and increase your time as you improve.  Making the most of your program  · Walk at least 3 times a week. Take no more than 2 days off between sessions. If you stop walking, even for a week or two, you can lose the health benefits of your program.  · Find a good place to walk. A treadmill or a track may be better at first. That way, you wont run the risk of going too far and finding that you cant walk back. Be sure to have a place to walk indoors in bad weather, such as a gym or a mall.  · Wear shoes with sturdy, flexible soles. The heel should fit without slipping. You should have room to wiggle your toes.  · Keep track of how long you walk. A pedometer will show your daily  progress. It can also show how much farther you can walk as time goes by.  · Ask a friend to keep you company. You may enjoy walking with someone else. Or you may want to make your walking sessions a time to relax by yourself.  · Make it fun. Listen to music while you walk and rest. Walk in a favorite park. Get to know the people at the gym, or the folks that you pass on your route. While you rest, you can window-shop, read, or feed the birds. Do whatever will help you enjoy your exercise sessions.  Date Last Reviewed: 6/1/2016  © 1120-6246 [employer]. [address] [place] [address], [place] [address]. All rights reserved. This information is not intended as a substitute for professional medical care. Always follow your healthcare professional's instructions.

## 2024-03-12 NOTE — ASSESSMENT & PLAN NOTE
- U/A dirty, CXR with possible PNA, BCx in process  - continue broad spectrum abx with IV vanc, zosyn, and azithromycin  - s/p 30 cc/kg of IVF, low dose levophed ordered if needed to maintain MAP > 65    Remains on pressor support. UTI with yeast. Started on diflucan. Pulmonary following for possible pneumonia   On Zosyn and zithromax and Vanc    Source not clear. Remains on 3 abx. Pressors required     Will d/c Abx. Off pressors      For information on Fall & Injury Prevention, visit: [email or web address] OR  [email or web address] OR  https://www.cdc.gov/steadi/patient.html

## 2025-02-19 NOTE — BRIEF OP NOTE
[facility] - Surgery  Brief Operative Note    Surgery Date: 2/23/2023     Surgeon(s) and Role:     * Danny Dunbar MD - Primary    Assisting Surgeon: None    Pre-op Diagnosis:  Atherosclerosis of artery of right lower extremity [I70.201]    Post-op Diagnosis:  Post-Op Diagnosis Codes:     * Atherosclerosis of artery of right lower extremity [I70.201]    Procedure(s) (LRB):  Angiogram Extremity Unilateral (Right)    Anesthesia: RN IV Sedation    Operative Findings: mildly diseased distal L CFA with occluded L fem-fem bypass     Estimated Blood Loss: * No values recorded between 2/23/2023 12:00 AM and 2/23/2023  8:51 AM *         Specimens:   Specimen (24h ago, onward)      None              Discharge Note    OUTCOME: Patient tolerated treatment/procedure well without complication and is now ready for discharge.    DISPOSITION: Home or Self Care    FINAL DIAGNOSIS:  <principal problem not specified>    FOLLOWUP: Will call with further plan    DISCHARGE INSTRUCTIONS:    Discharge Procedure Orders   Remove dressing in 48 hours     Activity as tolerated       
Alan Wilkinson)

## (undated) DEVICE — Device

## (undated) DEVICE — SPONGE LAP 18X18 PREWASHED

## (undated) DEVICE — GOWN SURGICAL X-LARGE

## (undated) DEVICE — SEE MEDLINE ITEM 157131

## (undated) DEVICE — SUT SILK 2-0 STRANDS 30IN

## (undated) DEVICE — CATH SEEKER .035 X65CM

## (undated) DEVICE — SET MICROPUNCTURE

## (undated) DEVICE — SPONGE GAUZE 16PLY 4X4

## (undated) DEVICE — ELECTRODE REM PLYHSV RETURN 9

## (undated) DEVICE — TRAY PERIPHERAL VASCULAR OMC

## (undated) DEVICE — TAPE CURAD SILK ADH 3INX10YD

## (undated) DEVICE — GOWN SMART IMP BREATHABLE XXLG

## (undated) DEVICE — SUT PROLENE 5-0 36IN C-1

## (undated) DEVICE — CATH IV INTROCAN 20G X 1.1

## (undated) DEVICE — MATRIX FLOSEAL HEMOSTATIC 10ML

## (undated) DEVICE — TRAY FOLEY 16FR INFECTION CONT

## (undated) DEVICE — TOWEL OR DISP STRL BLUE 4/PK

## (undated) DEVICE — SUT CTD VICRYL 0 UND BR CT

## (undated) DEVICE — SUTURE TT13 36IN CV6

## (undated) DEVICE — APPLICATOR CHLORAPREP ORN 26ML

## (undated) DEVICE — DRESSING TEGADERM 2 3/8 X 2.75

## (undated) DEVICE — SEE MEDLINE ITEM 153688

## (undated) DEVICE — KIT IRR SUCTION HND PIECE

## (undated) DEVICE — PAD ABD 8X10 STERILE

## (undated) DEVICE — CATH FOGARTY ART EMB 3FR 80CM

## (undated) DEVICE — SUT MCRYL PLUS 4-0 PS2 27IN

## (undated) DEVICE — GUIDEWIRE STF .035X350CM ANG

## (undated) DEVICE — APPLIER CLIP LIAGCLIP 9.375IN

## (undated) DEVICE — STOCKINET TUBULAR 1 PLY 6X60IN

## (undated) DEVICE — STAPLER SKIN PROXIMATE WIDE

## (undated) DEVICE — SET DECANTER MEDICHOICE

## (undated) DEVICE — KIT PROBE COVER WITH GEL

## (undated) DEVICE — SYS LABEL CORRECT MED

## (undated) DEVICE — SEE MEDLINE ITEM 154981

## (undated) DEVICE — COVER INSTR ELASTIC BAND 40X20

## (undated) DEVICE — SHEATH INTRO PINNACLE 6F 10CM

## (undated) DEVICE — SUT PROLENE 6-0 24 BV-1

## (undated) DEVICE — INFLATOR ENCORE

## (undated) DEVICE — DRAIN CHANNEL ROUND 15FR

## (undated) DEVICE — TUBING SUC UNIV W/CONN 12FT

## (undated) DEVICE — DRAPE INCISE IOBAN 2 23X17IN

## (undated) DEVICE — SUT 3-0 12-18IN SILK

## (undated) DEVICE — SUT 4-0 12-18IN SILK BLACK

## (undated) DEVICE — GUIDEWIRE STF .035X180CM ANG

## (undated) DEVICE — KIT COLLECTION E SWAB REGULAR

## (undated) DEVICE — DRAPE THREE-QTR REINF 53X77IN

## (undated) DEVICE — COVER LIGHT HANDLE 80/CA

## (undated) DEVICE — PACK UNIVERSAL SPLIT II

## (undated) DEVICE — SUT SILK 3-0 STRANDS 30IN

## (undated) DEVICE — SYR LUER LOCK 1CC

## (undated) DEVICE — DRESSING TRANS 4X4 TEGADERM

## (undated) DEVICE — DRAPE T EXTRM SURG 121X128X90

## (undated) DEVICE — SUT MONOCRYL 2-0 CT-1 VIL

## (undated) DEVICE — BLADE SCALP OPHTL BEVEL STR

## (undated) DEVICE — SUT VICRYL 2-0 36 CT-1

## (undated) DEVICE — BLLN MUSTANG 7 X 40 X 75

## (undated) DEVICE — DRESSING XEROFORM FOIL PK 1X8

## (undated) DEVICE — INTRODUCER VASC RADPQ 5FRX10CM

## (undated) DEVICE — DRESSING XEROFORM NONADH 1X8IN

## (undated) DEVICE — CATH SEEKER .035IN 90CM

## (undated) DEVICE — SHEATH GUID FLX ANSEL 6FR 45CM

## (undated) DEVICE — WIRE GUIDE CHOICE PT 300CM

## (undated) DEVICE — TRAY MINOR GEN SURG OMC

## (undated) DEVICE — SUT VICRYL CT-1 2-0 27IN

## (undated) DEVICE — CLIP MED TICALL

## (undated) DEVICE — DRESSING ADHESIVE ISLAND 3 X 6

## (undated) DEVICE — SUT 3/0 30IN ETHILON BLK M

## (undated) DEVICE — STAPLER SKIN 35 WIDE

## (undated) DEVICE — SUT VICRYL CTD 2-0 GI 27 SH

## (undated) DEVICE — DRAPE U SPLIT SHEET 54X76IN

## (undated) DEVICE — BOVIE SUCTION

## (undated) DEVICE — SEE MEDLINE ITEM 152622

## (undated) DEVICE — SPONGE DERMACEA GAUZE 4X4

## (undated) DEVICE — SUT LIGACLIP SMALL XTRA

## (undated) DEVICE — SPRAY MASTISOL

## (undated) DEVICE — DRESSING INFOVAC MED RND BLK

## (undated) DEVICE — DRESSING ABSRBNT ISLAND 3.6X8

## (undated) DEVICE — CATH ALL PUR URTHL RR 20FR

## (undated) DEVICE — SOL NACL 0.9% INJ 500ML BG

## (undated) DEVICE — BANDAGE ELAS SOFTWRAP ST 4X5YD

## (undated) DEVICE — GAUZE SPONGE 4X4 12PLY

## (undated) DEVICE — SUT MONOCRYL 3-0 PS-2 UND

## (undated) DEVICE — KIT INTRO MICRO NIT VSI 4FR

## (undated) DEVICE — DRESSING TELFA STRL 4X3 LF

## (undated) DEVICE — DRESSING MEPITEL 4X7.2IN

## (undated) DEVICE — LOOP VESSEL BLUE MAXI

## (undated) DEVICE — SUT PROLENE 5-0 24 C-1 BL

## (undated) DEVICE — CONTAINER SPECIMEN STRL 4OZ

## (undated) DEVICE — STOCKINET 4INX48

## (undated) DEVICE — HEMOSTAT SURGICEL 2X3IN

## (undated) DEVICE — TOURNIQUET SB QC DP 18X4IN

## (undated) DEVICE — STRIP PACKING ANTIMIC 1/4X1

## (undated) DEVICE — SUT PROLENE 6-0 C-1 30IN BL

## (undated) DEVICE — SUT SILK 3-0 BLK BR SH 30IN

## (undated) DEVICE — CATH ANGIO NONBRD KUMPE 5F

## (undated) DEVICE — TAPE SURG DURAPORE 2 X10YD

## (undated) DEVICE — SOL VASHE INSTILLATION WND 16

## (undated) DEVICE — COVERS PROBE NR-48 STERILE

## (undated) DEVICE — DRAPE INCISE IOBAN 2 23X33IN

## (undated) DEVICE — HEMOSTAT SURGICEL 4X8IN

## (undated) DEVICE — SEE MEDLINE ITEM 156911

## (undated) DEVICE — SUT PROLENE 4-0 RB-1 BL MO

## (undated) DEVICE — SUT 2-0 VICRYL / CT-1

## (undated) DEVICE — SUT 2/0 30IN SILK BLK BRAI

## (undated) DEVICE — SHEATH DESTINATION 6FR 45CM

## (undated) DEVICE — CATH SHCKWAVE M5+ IVL 6.0X60MM

## (undated) DEVICE — SOL 9P NACL IRR PIC IL

## (undated) DEVICE — SUT VICRYL PLUS 0 CT1 18IN

## (undated) DEVICE — CANISTER INFOV.A.C WOUND 500ML

## (undated) DEVICE — BANDAGE DERMACEA STRETCH 4X1IN

## (undated) DEVICE — SUT MONOCRYL 3-0 SH U/D

## (undated) DEVICE — SUT 7/0 24IN PROLENE BL MO

## (undated) DEVICE — SPONGE DERMACEA 4X4IN 12PLY

## (undated) DEVICE — SUT SILK 2-0 SH 18IN BLACK

## (undated) DEVICE — DRESSING VERSA-FOAM W/O TUBE

## (undated) DEVICE — PAD CAST SPECIALIST STRL 4

## (undated) DEVICE — KIT SAHARA DRAPE DRAW/LIFT

## (undated) DEVICE — GUIDEWIRE CHOICE PT  XS 182CM

## (undated) DEVICE — DRAPE STERI INSTRUMENT 1018

## (undated) DEVICE — ELECTRODE EXTENDED BLADE

## (undated) DEVICE — EVACUATOR WOUND BULB 100CC

## (undated) DEVICE — BANDAGE ESMARK ELASTIC ST 4X9

## (undated) DEVICE — SUT VICRYL 3-0 27 SH

## (undated) DEVICE — PAD K-THERMIA 24IN X 60IN

## (undated) DEVICE — DEV-O-LOOPS MAXI RED

## (undated) DEVICE — SUT 2-0 12-18IN SILK

## (undated) DEVICE — DRESSING ADH ISLAND 3.6 X 14

## (undated) DEVICE — GOWN POLY REINF BRTH SLV XL

## (undated) DEVICE — SUT 1 48IN PDS II VIO MONO

## (undated) DEVICE — TRAY MINOR GEN SURG

## (undated) DEVICE — TRAY SKIN SCRUB WET PREMIUM

## (undated) DEVICE — SOL NS 1000CC

## (undated) DEVICE — DRESSING XEROFORM GAUZE 5X9

## (undated) DEVICE — SUT 2/0 36IN COATED VICRYL

## (undated) DEVICE — TRAY MINOR ORTHO

## (undated) DEVICE — DRAPE PLASTIC U 60X72

## (undated) DEVICE — TRAY MINOR ORTHO OMC

## (undated) DEVICE — RETRACTOR RADIALUX LIGHTED

## (undated) DEVICE — PACK DRAPE UNIVERSAL CONVERTOR

## (undated) DEVICE — SUT SILK 0 STRANDS 30IN BLK